# Patient Record
Sex: MALE | Race: WHITE | NOT HISPANIC OR LATINO | Employment: OTHER | URBAN - METROPOLITAN AREA
[De-identification: names, ages, dates, MRNs, and addresses within clinical notes are randomized per-mention and may not be internally consistent; named-entity substitution may affect disease eponyms.]

---

## 2017-01-03 ENCOUNTER — OFFICE VISIT (OUTPATIENT)
Dept: OPTOMETRY | Facility: CLINIC | Age: 67
End: 2017-01-03
Payer: COMMERCIAL

## 2017-01-03 DIAGNOSIS — H52.203 HYPEROPIA WITH ASTIGMATISM AND PRESBYOPIA, BILATERAL: ICD-10-CM

## 2017-01-03 DIAGNOSIS — H53.8 BLURRED VISION, LEFT EYE: ICD-10-CM

## 2017-01-03 DIAGNOSIS — H52.4 HYPEROPIA WITH ASTIGMATISM AND PRESBYOPIA, BILATERAL: ICD-10-CM

## 2017-01-03 DIAGNOSIS — H52.03 HYPEROPIA WITH ASTIGMATISM AND PRESBYOPIA, BILATERAL: ICD-10-CM

## 2017-01-03 DIAGNOSIS — Z96.1 PSEUDOPHAKIA OF BOTH EYES: ICD-10-CM

## 2017-01-03 DIAGNOSIS — H18.529 MAP-DOT-FINGERPRINT CORNEAL DYSTROPHY: Primary | ICD-10-CM

## 2017-01-03 PROCEDURE — 92014 COMPRE OPH EXAM EST PT 1/>: CPT | Mod: S$GLB,,, | Performed by: OPTOMETRIST

## 2017-01-03 PROCEDURE — 99999 PR PBB SHADOW E&M-EST. PATIENT-LVL III: CPT | Mod: PBBFAC,,, | Performed by: OPTOMETRIST

## 2017-01-03 RX ORDER — CYCLOSPORINE 0.5 MG/ML
1 EMULSION OPHTHALMIC 2 TIMES DAILY
Qty: 60 VIAL | Refills: 12 | Status: SHIPPED | OUTPATIENT
Start: 2017-01-03 | End: 2017-01-23

## 2017-01-04 NOTE — PROGRESS NOTES
HPI     Patient's age: 66 y.o.    Approximate date of last eye examination:  9/30/15  Name of last eye doctor seen: Dr. Salguero    Pt states that he is here for follow up from last visit not having any   trouble with eyes today.    Wears glasses? yes      Wears CLs?:  no             Headaches?  no  Eye pain/discomfort?  no                                                                                     Flashes?  no  Floaters?  no  Diplopia/Double vision?  no    Patient's Ocular History:         Any eye surgeries? yes       Family history of eye disease?  no    Significant patient medical history:         1. Diabetes?  no       If yes, IDDM or NIDDM? no   2. HBP?  no                ! OTC eyedrops currently using:  OTC  Refresh optive - OU   ! Prescription eye meds currently using:  Xiidra - OU           Last edited by Christine Finch MA on 1/3/2017  1:15 PM.         Assessment /Plan     For exam results, see Encounter Report.    Map-dot-fingerprint corneal dystrophy   Started on Xiidra last visit and refresh optive BID   Pt experiences intense stinging and blurred vision upon instillation of drops    Stop xiidra , start  cycloSPORINE (RESTASIS) 0.05 % ophthalmic emulsion; Place 0.4 mLs (1 drop total) into both eyes 2 (two) times daily.  Dispense: 60 vial; Refill: 12   RTC 1 month for follow up    Blurred vision, left eye  Pseudophakia of both eyes   Pt sometimes uses pilocarpine for to help with glare and halos since after cataract surgery    Hyperopia with astigmatism and presbyopia, bilateral  No need to change specs at this time   Recheck OS Rx at next visit      Fundus exam WNL today

## 2017-01-09 ENCOUNTER — TELEPHONE (OUTPATIENT)
Dept: NEUROLOGY | Facility: CLINIC | Age: 67
End: 2017-01-09

## 2017-01-16 ENCOUNTER — LAB VISIT (OUTPATIENT)
Dept: LAB | Facility: HOSPITAL | Age: 67
End: 2017-01-16
Attending: INTERNAL MEDICINE
Payer: COMMERCIAL

## 2017-01-16 DIAGNOSIS — Z13.9 SCREENING: ICD-10-CM

## 2017-01-16 DIAGNOSIS — E78.00 PURE HYPERCHOLESTEROLEMIA: ICD-10-CM

## 2017-01-16 DIAGNOSIS — Z12.5 PROSTATE CANCER SCREENING: ICD-10-CM

## 2017-01-16 LAB
25(OH)D3+25(OH)D2 SERPL-MCNC: 46 NG/ML
ALBUMIN SERPL BCP-MCNC: 4.2 G/DL
ALP SERPL-CCNC: 46 U/L
ALT SERPL W/O P-5'-P-CCNC: 25 U/L
ANION GAP SERPL CALC-SCNC: 8 MMOL/L
AST SERPL-CCNC: 25 U/L
BASOPHILS # BLD AUTO: 0.03 K/UL
BASOPHILS NFR BLD: 0.5 %
BILIRUB SERPL-MCNC: 0.8 MG/DL
BUN SERPL-MCNC: 15 MG/DL
CALCIUM SERPL-MCNC: 9.7 MG/DL
CHLORIDE SERPL-SCNC: 101 MMOL/L
CHOLEST/HDLC SERPL: 3.3 {RATIO}
CO2 SERPL-SCNC: 30 MMOL/L
COMPLEXED PSA SERPL-MCNC: 6.1 NG/ML
CREAT SERPL-MCNC: 1 MG/DL
CRP SERPL-MCNC: 1.5 MG/L
DIFFERENTIAL METHOD: ABNORMAL
EOSINOPHIL # BLD AUTO: 0.2 K/UL
EOSINOPHIL NFR BLD: 3 %
ERYTHROCYTE [DISTWIDTH] IN BLOOD BY AUTOMATED COUNT: 11.9 %
EST. GFR  (AFRICAN AMERICAN): >60 ML/MIN/1.73 M^2
EST. GFR  (NON AFRICAN AMERICAN): >60 ML/MIN/1.73 M^2
GLUCOSE SERPL-MCNC: 94 MG/DL
HCT VFR BLD AUTO: 48.2 %
HDL/CHOLESTEROL RATIO: 30.8 %
HDLC SERPL-MCNC: 221 MG/DL
HDLC SERPL-MCNC: 68 MG/DL
HGB BLD-MCNC: 16.1 G/DL
LDLC SERPL CALC-MCNC: 128.2 MG/DL
LYMPHOCYTES # BLD AUTO: 2.2 K/UL
LYMPHOCYTES NFR BLD: 35.2 %
MCH RBC QN AUTO: 31.3 PG
MCHC RBC AUTO-ENTMCNC: 33.4 %
MCV RBC AUTO: 94 FL
MONOCYTES # BLD AUTO: 0.5 K/UL
MONOCYTES NFR BLD: 7.3 %
NEUTROPHILS # BLD AUTO: 3.4 K/UL
NEUTROPHILS NFR BLD: 53.5 %
NONHDLC SERPL-MCNC: 153 MG/DL
PLATELET # BLD AUTO: 258 K/UL
PMV BLD AUTO: 9.5 FL
POTASSIUM SERPL-SCNC: 4.5 MMOL/L
PROT SERPL-MCNC: 7.8 G/DL
RBC # BLD AUTO: 5.15 M/UL
SODIUM SERPL-SCNC: 139 MMOL/L
TRIGL SERPL-MCNC: 124 MG/DL
WBC # BLD AUTO: 6.27 K/UL

## 2017-01-16 PROCEDURE — 82306 VITAMIN D 25 HYDROXY: CPT

## 2017-01-16 PROCEDURE — 36415 COLL VENOUS BLD VENIPUNCTURE: CPT

## 2017-01-16 PROCEDURE — 85025 COMPLETE CBC W/AUTO DIFF WBC: CPT

## 2017-01-16 PROCEDURE — 80061 LIPID PANEL: CPT

## 2017-01-16 PROCEDURE — 80053 COMPREHEN METABOLIC PANEL: CPT

## 2017-01-16 PROCEDURE — 86140 C-REACTIVE PROTEIN: CPT

## 2017-01-16 PROCEDURE — 84153 ASSAY OF PSA TOTAL: CPT

## 2017-01-18 ENCOUNTER — DOCUMENTATION ONLY (OUTPATIENT)
Dept: REHABILITATION | Facility: OTHER | Age: 67
End: 2017-01-18
Attending: PHYSICAL MEDICINE & REHABILITATION
Payer: COMMERCIAL

## 2017-01-18 NOTE — PROGRESS NOTES
Health  Wellness Visit Note    Name: Raffy Rutherford Jr.  Clinic Number: [id number]  Physician: No ref. provider found  Diagnosis: No diagnosis found.  Past Medical History   Diagnosis Date    Adenomatous polyp 2003    Adenomatous polyp     Allergy     Arthritis     Back pain      after trauma beginning in 195    Chronic pain      neck and left shoulder    Cluster headache 2013    Colon polyp     Degenerative disc disease     Depression     Diverticulitis 12/2013    Fibromyalgia 2013    GERD (gastroesophageal reflux disease)     Hepatitis 1970's     A    History of prostate biopsy 2002    Hyperlipidemia     Joint pain     Sleep apnea     Special screening for malignant neoplasms, colon 5/5/2014    Thyroid nodule 7/16/2014    Visual disturbance 2012     problems after cataract surgery     Visit Number: 42  Precautions: Standard  Time In:2:30 PM  Time Out: 3:17 PM  Total Treatment Time: 47 minutes    Subjective:   Patient reports that his neck is feeling great; not currently having any flare ups or increase in symptoms; mentioned that his lower back is feeling ok-pain still comes and goes and is primarily positional; mainly has lower back pain when sitting. Patient ordered a new chair for his office and finds that the chair is not appropriate for sitting for an extended period of time; says that his workouts are continuing to progress well-has decided to remove sugar completely from his diet and has started eating more protein; has been increasing the intensity of his independent workouts (pool exercises). Praised patient for his efforts.    Objective:   Raffy completed therapeutic stretches (EIL, Neck Retractions) and the following NextDigest exercise machines: core cervical, core lumbar, leg extension, leg curl, upright row, chest press, biceps curl, triceps extension, leg press    Please see exercise log in patient folder for rate of exertion and repetitions completed.     Assessment:   Patient  tolerated all exercises listed above on MedX equipment without any increase in symptoms; tolerated weight increases on the  leg extension, leg curl, row, chest press, and leg press. Iced neck and lower back after completion of exercises.     Plan:  Continue with established plan of care towards wellness goals.     Health : Kylee Zavaleta  12/28/2016

## 2017-01-23 ENCOUNTER — OFFICE VISIT (OUTPATIENT)
Dept: INTERNAL MEDICINE | Facility: CLINIC | Age: 67
End: 2017-01-23
Payer: COMMERCIAL

## 2017-01-23 ENCOUNTER — HOSPITAL ENCOUNTER (OUTPATIENT)
Dept: RADIOLOGY | Facility: HOSPITAL | Age: 67
Discharge: HOME | End: 2017-01-23
Attending: INTERNAL MEDICINE
Payer: COMMERCIAL

## 2017-01-23 VITALS
DIASTOLIC BLOOD PRESSURE: 70 MMHG | WEIGHT: 178.81 LBS | HEIGHT: 68 IN | HEART RATE: 64 BPM | BODY MASS INDEX: 27.1 KG/M2 | SYSTOLIC BLOOD PRESSURE: 104 MMHG

## 2017-01-23 DIAGNOSIS — R97.20 ELEVATED PSA: ICD-10-CM

## 2017-01-23 DIAGNOSIS — M54.16 LUMBAR BACK PAIN WITH RADICULOPATHY AFFECTING RIGHT LOWER EXTREMITY: ICD-10-CM

## 2017-01-23 DIAGNOSIS — R05.9 COUGH: ICD-10-CM

## 2017-01-23 DIAGNOSIS — Z00.00 ANNUAL PHYSICAL EXAM: Primary | ICD-10-CM

## 2017-01-23 DIAGNOSIS — G47.33 OBSTRUCTIVE SLEEP APNEA SYNDROME: ICD-10-CM

## 2017-01-23 DIAGNOSIS — G70.9 NEUROMUSCULAR DISORDER: ICD-10-CM

## 2017-01-23 PROCEDURE — 99999 PR PBB SHADOW E&M-EST. PATIENT-LVL IV: CPT | Mod: PBBFAC,,, | Performed by: INTERNAL MEDICINE

## 2017-01-23 PROCEDURE — 71020 XR CHEST PA AND LATERAL: CPT | Mod: TC

## 2017-01-23 PROCEDURE — 71020 XR CHEST PA AND LATERAL: CPT | Mod: 26,,, | Performed by: RADIOLOGY

## 2017-01-23 PROCEDURE — 99396 PREV VISIT EST AGE 40-64: CPT | Mod: S$GLB,,, | Performed by: INTERNAL MEDICINE

## 2017-01-23 NOTE — PROGRESS NOTES
Subjective:       Patient ID: Raffy Rutherford Jr. is a 66 y.o. male.    Chief Complaint: Annual Exam    HPI   Patients's list    Elevated PSA: Urology requested it be repeated  Fluid retention: patient did a test at [facility], his  questioned fluid weight:patient feels bloating ( will try a probiotic) suggested Fage daily  Diet: high protein, low fat  Restasis made tinnitus worse  Recurring pain: not seeing pain management, stopped the hydrocodone, started working out and doing stretches: using external TENS unit    Annual exam today     Annual exam: 1/23/2017  Colonoscopy 5/5/2014 : 3 polyps 5-10 years due 2019  Endoscopy 4/2015 pathology  Optho: Cataract surgery with continued visual changes: Dr. Ocampo  Tinnitus:   Vertigo stable lately  Flu: 2017  Tetanus: done  Shingles: done  Pneumovax :   Prevnar due at 67             Consultants  Dr. Jacob: uncertain neuro  Dr. Daniella Ocampo    Review of Systems   Constitutional: Negative for appetite change, chills, fever and unexpected weight change.   Respiratory: Negative for shortness of breath and wheezing.    Cardiovascular: Negative for chest pain, palpitations and leg swelling.   Gastrointestinal: Negative for abdominal pain, blood in stool, diarrhea, nausea and vomiting.     Cough may be related to GERD but will get  CXR  Objective:      Physical Exam   Constitutional: He is oriented to person, place, and time. He appears well-developed and well-nourished. No distress.   Neck: Carotid bruit is not present. No thyromegaly present.   Cardiovascular: Normal rate, regular rhythm and normal heart sounds.  PMI is not displaced.    Pulmonary/Chest: Effort normal and breath sounds normal. No respiratory distress.   Abdominal: Soft. Bowel sounds are normal. He exhibits no distension. There is no tenderness.   Musculoskeletal: He exhibits no edema.   Neurological: He is alert and oriented to person, place, and time.       Assessment:       1.  Annual physical exam    2. Elevated PSA    3. Lumbar back pain with radiculopathy affecting right lower extremity    4. Neuromuscular disorder    5. Cough    6. Obstructive sleep apnea syndrome        Plan:       Raffy was seen today for annual exam.    Diagnoses and all orders for this visit:    Annual physical exam: Health Maintenance Reviewed    Elevated PSA: elevated PSA, Mr. MONTY Gonzales asked the patient if the PSA could be repeated.  -     PROSTATE SPECIFIC ANTIGEN, DIAGNOSTIC; Future    Lumbar back pain with radiculopathy affecting right lower extremity: Patient wished to consider options for back pain.  -     Ambulatory consult to Neurosurgery    Neuromuscular disorder:Patient wished to see Dr. Palacios He has been seen both at [facility] and [facility]  -     Ambulatory consult to Neurology    Cough: discussed GERD  -     X-Ray Chest PA And Lateral; Future    Obstructive sleep apnea syndrome: check equipment  -     Ambulatory consult to Sleep Disorders      Return in about 6 months (around 7/23/2017) for Follow up no lab.    New Prescriptions    No medications on file       Modified Medications    No medications on file       Orders Placed This Encounter   Procedures    X-Ray Chest PA And Lateral     Standing Status:   Future     Standing Expiration Date:   3/24/2017    PROSTATE SPECIFIC ANTIGEN, DIAGNOSTIC     Standing Status:   Future     Standing Expiration Date:   3/24/2018    Ambulatory consult to Neurosurgery     Referral Priority:   Routine     Referral Type:   Consultation     Referral Reason:   Specialty Services Required     Referred to Provider:   Valeria Gross MD     Requested Specialty:   Neurosurgery     Number of Visits Requested:   1    Ambulatory consult to Neurology     Referral Priority:   Routine     Referral Type:   Consultation     Referral Reason:   Specialty Services Required     Requested Specialty:   Neurology     Number of Visits Requested:   1    Ambulatory consult to Sleep Disorders      Referral Priority:   Routine     Referral Type:   Consultation     Number of Visits Requested:   1       Labs, studies and consults associated with this visit were reviewed

## 2017-01-23 NOTE — MR AVS SNAPSHOT
[facility]  [address]  [facility] [address]  Phone: [phone]  Fax: [phone]                  Raffy Rutherford Jr.   2017 9:00 AM   Office Visit    Description:  Male : 1950   Provider:  Nini Rendon MD   Department:  [facility]           Reason for Visit     Annual Exam           Diagnoses this Visit        Comments    Annual physical exam    -  Primary     Elevated PSA         Lumbar back pain with radiculopathy affecting right lower extremity         Neuromuscular disorder         Cough         Obstructive sleep apnea syndrome                To Do List           Future Appointments        Provider Department Dept Phone    2017 1:30 PM Kylee MILLAN McGehee [facility] [place] [phone]    2017 8:30 AM MD Ian Nguyễn [facility] - Gastroenterology [phone]    2017 1:30 PM Jackie Ocampo OD [facility] - Optometry [phone]      Goals (5 Years of Data)     None      Follow-Up and Disposition     Return in about 6 months (around 2017) for Follow up no lab.      [facility]     [facility] -  Assistance  Registered nurses in the [facility] provide clinical advisement, health education, appointment booking, and other advisory services.  Call for this free service at [phone].             Medications           Message regarding Medications     Verify the changes and/or additions to your medication regime listed below are the same as discussed with your clinician today.  If any of these changes or additions are incorrect, please notify your healthcare provider.        STOP taking these medications     cycloSPORINE (RESTASIS) 0.05 % ophthalmic emulsion Place 0.4 mLs (1 drop total) into both eyes 2 (two) times daily.    desoximetasone 0.05 % Gel Apply to affected area 2 times a day as needed    EMSAM 9 mg/24 hr     gentamicin injection 160 mg     pantoprazole  "(PROTONIX) 40 MG tablet Take 1 tablet (40 mg total) by mouth once daily.    CARAFATE 100 mg/mL suspension            Verify that the below list of medications is an accurate representation of the medications you are currently taking.  If none reported, the list may be blank. If incorrect, please contact your healthcare provider. Carry this list with you in case of emergency.           Current Medications     acetaZOLAMIDE (DIAMOX) 250 MG tablet     benzonatate (TESSALON) 100 MG capsule Take 200 mg by mouth 3 (three) times daily as needed.    clonazePAM (KLONOPIN) 0.5 MG tablet 1 mg.     cyanocobalamin 1,000 mcg/mL injection     fish oil-omega-3 fatty acids 300-1,000 mg capsule Take 2 g by mouth once daily.    hydrocortisone-pramoxine (ANALPRAM-HC) 2.5-1 % Crea 2.5 mg.    ketoconazole (NIZORAL) 2 % cream Apply to affected area 2 times a day for 2 weeks    lamotrigine (LAMICTAL) 100 MG tablet Take 100 mg by mouth once daily.     lidocaine-prilocaine (EMLA) cream     meclizine (ANTIVERT) 25 mg tablet Take 1 tablet (25 mg total) by mouth every 6 (six) hours. Prn vertigo    rabeprazole (ACIPHEX) 20 mg tablet Take 1 tablet (20 mg total) by mouth 2 (two) times daily.    selegiline (EMSAM) 12 mg/24 hr Place 1 patch onto the skin once daily.    selegiline (EMSAM) 12 mg/24 hr Place 1 patch onto the skin once daily.    senna-docusate 8.6-50 mg (PERICOLACE) 8.6-50 mg per tablet Take 2 tablets by mouth nightly as needed for Constipation.    sucralfate (CARAFATE) 1 gram tablet as needed.     trazodone (DESYREL) 100 MG tablet 1 and 1/2 talbet daily    UNABLE TO FIND medication name: cefaly + electrodes, wear 20-30 mins, Dx: migraine    VOLTAREN 1 % Gel     VITAMIN D2 50,000 unit capsule TAKE 1 CAPSULE BY MOUTH TWICE A WEEK           Clinical Reference Information           Vital Signs - Last Recorded  Most recent update: 1/23/2017  8:21 AM by Alecia Cummings MA    BP Pulse Ht Wt BMI    104/70 64 5' 8" (1.727 m) 81.1 kg (178 lb " 12.7 oz) 27.19 kg/m2      Blood Pressure          Most Recent Value    BP  104/70      Allergies as of 1/23/2017     Alphagan [Brimonidine]    Coumadin [Warfarin]    Oxycodone      Immunizations Administered on Date of Encounter - 1/23/2017     None      Orders Placed During Today's Visit      Normal Orders This Visit    Ambulatory consult to Neurology     Ambulatory consult to Neurosurgery     Ambulatory consult to Sleep Disorders     Future Labs/Procedures Expected by Expires    PROSTATE SPECIFIC ANTIGEN, DIAGNOSTIC  1/23/2017 3/24/2018    X-Ray Chest PA And Lateral  1/23/2017 3/24/2017

## 2017-01-25 ENCOUNTER — DOCUMENTATION ONLY (OUTPATIENT)
Dept: REHABILITATION | Facility: OTHER | Age: 67
End: 2017-01-25
Attending: PHYSICAL MEDICINE & REHABILITATION
Payer: COMMERCIAL

## 2017-01-25 ENCOUNTER — OFFICE VISIT (OUTPATIENT)
Dept: UROLOGY | Facility: CLINIC | Age: 67
End: 2017-01-25
Payer: COMMERCIAL

## 2017-01-25 VITALS
HEIGHT: 68 IN | BODY MASS INDEX: 27 KG/M2 | DIASTOLIC BLOOD PRESSURE: 75 MMHG | WEIGHT: 178.13 LBS | SYSTOLIC BLOOD PRESSURE: 99 MMHG | HEART RATE: 68 BPM | RESPIRATION RATE: 16 BRPM

## 2017-01-25 DIAGNOSIS — N13.8 BPH WITH URINARY OBSTRUCTION: Primary | ICD-10-CM

## 2017-01-25 DIAGNOSIS — N40.1 BPH WITH URINARY OBSTRUCTION: Primary | ICD-10-CM

## 2017-01-25 DIAGNOSIS — R97.20 ELEVATED PSA: ICD-10-CM

## 2017-01-25 LAB
BILIRUB SERPL-MCNC: ABNORMAL MG/DL
BLOOD URINE, POC: ABNORMAL
COLOR, POC UA: ABNORMAL
GLUCOSE UR QL STRIP: ABNORMAL
KETONES UR QL STRIP: ABNORMAL
LEUKOCYTE ESTERASE URINE, POC: ABNORMAL
NITRITE, POC UA: ABNORMAL
PH, POC UA: 5
PROTEIN, POC: ABNORMAL
SPECIFIC GRAVITY, POC UA: 1.01
UROBILINOGEN, POC UA: ABNORMAL

## 2017-01-25 PROCEDURE — 99213 OFFICE O/P EST LOW 20 MIN: CPT | Mod: 25,S$GLB,, | Performed by: NURSE PRACTITIONER

## 2017-01-25 PROCEDURE — 81002 URINALYSIS NONAUTO W/O SCOPE: CPT | Mod: S$GLB,,, | Performed by: NURSE PRACTITIONER

## 2017-01-25 PROCEDURE — 99999 PR PBB SHADOW E&M-EST. PATIENT-LVL IV: CPT | Mod: PBBFAC,,, | Performed by: NURSE PRACTITIONER

## 2017-01-25 NOTE — PROGRESS NOTES
Subjective:       Patient ID: Raffy Rutherford Jr. is a 66 y.o. male.    Chief Complaint: Elevated PSA    HPI Comments: Raffy Rutherford Jr. is a 66 y.o. Male with history of BPH, prostatitis and elevated PSA.  (-) Trus/bx 1/117/2011 with Dr. El. PSA was 5.3 at the time.  Last clinic visit 12/17/2015  Unable to tolerate alpha blockers.    Here today with concerns for elevated PSA.  No voices no urinary complaints.  FOS is good.  Nocturia 0-1                        PSA                  5.9 (H)             01/23/2017                 PSA                  6.1 (H)             01/16/2017             PSA                  4.3*                04/28/2015                 PSA                  4.6*                02/25/2015                 PSA                      4.5*                02/21/2014                 PSA                      4.42*               05/06/2013                 PSA                      5.47*               10/04/2012                 PSA                      5.3*                01/03/2011                 PSA                      4.2*                09/27/2010                 PSA                      5.2*                08/23/2010                 PSA                      3.4                 07/21/2010                 PSA                      3.9                 05/10/2010                 PSA                      3.4                 04/22/2009                 PSA                      2.1                 10/16/2007                                Past Medical History:    Depression                                                    Hyperlipidemia                                                Chronic pain                                                    Comment:neck and left shoulder    Colon polyp                                                   Adenomatous polyp                               2003          History of prostate biopsy                      2002          GERD (gastroesophageal reflux disease)                         Back pain                                                       Comment:after trauma beginning in 195    Sleep apnea                                                   Allergy                                                       Arthritis                                                     Joint pain                                                    Hepatitis                                       1970's          Comment:A    Visual disturbance                              2012            Comment:problems after cataract surgery    Degenerative disc disease                                     Fibromyalgia                                    2013          Cluster headache                                2013          Diverticulitis                                  12/2013       Thyroid nodule                                  7/16/2014     Special screening for malignant neoplasms, col* 5/5/2014      Adenomatous polyp                                             Past Surgical History:    SINUS SURGERY                                                  KNEE SURGERY                                                     Comment:involving arthroscopic surgery to both knees    HEMORRHOID SURGERY                                               Comment:with complication of chronic bleeding for 6                weeks     CHOLECYSTECTOMY                                                SINUS SURGERY                                                    Comment:left molar and sinus surgery for trigeminal                neuralgia    EYE SURGERY                                                    BACK SURGERY                                                   JOINT REPLACEMENT                                              TOTAL HIP ARTHROPLASTY                           4/2012          Comment:Pt states he had total hip replacement on his                left hip.    COSMETIC SURGERY                                 2/10/2015        Comment:Direct mid-forehead brow lift    COSMETIC SURGERY                                 2/10/2015       Comment:Bilateral upper lid blepahroplasty    SPINE SURGERY                                    6/22/15         Comment:XLIF/TANA    CATARACT EXTRACTION W/  INTRAOCULAR LENS IMPLA* Bilateral               SPINAL FUSION                                    6/22/2015       Comment:L3-L5 XLIF    Review of patient's family history indicates:    Cancer                         Mother                      Comment: colon; uterine    Nephrolithiasis                Mother                    Stroke                         Mother                    Pancreatitis                   Brother                   Vision loss                    Brother                     Comment: macular degeneration    Diabetes                       Brother                   Macular degeneration           Brother                   Melanoma                       Neg Hx                    Amblyopia                      Neg Hx                    Blindness                      Neg Hx                    Cataracts                      Neg Hx                    Glaucoma                       Neg Hx                    Hypertension                   Neg Hx                    Retinal detachment             Neg Hx                    Strabismus                     Neg Hx                    Thyroid disease                Neg Hx                    Migraines                      Sister                    No Known Problems              Father                    No Known Problems              Maternal Grandmother      No Known Problems              Maternal Grandfather      No Known Problems              Paternal Grandmother      No Known Problems              Paternal Grandfather      No Known Problems              Sister                    No Known Problems              Maternal Aunt             No Known Problems              Maternal Uncle            No Known  Problems              Paternal Aunt             No Known Problems              Paternal Uncle              Social History    Marital Status:              Spouse Name:                       Years of Education:                 Number of children:               Occupational History  Occupation          Employer            Comment               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*                         Social History Main Topics    Smoking Status: Never Smoker                      Smokeless Status: Never Used                        Alcohol Use: Yes                Comment: occasion    Drug Use: No              Sexual Activity: Yes               Partners with: Female    Other Topics            Concern    None on file    Social History Narrative    None on file        Allergies:  Alphagan; Coumadin; and Oxycodone    Medications:  Current outpatient prescriptions: alendronate (FOSAMAX) 70 MG tablet, Take 1 tablet (70 mg total) by mouth every 7 days., Disp: 12 tablet, Rfl: 3;  clonazePAM (KLONOPIN) 1 MG tablet, TAKE 2 TABLETS AT BEDTIME. (Patient taking differently: TAKE 2 TABLETS AT BEDTIME. patient states taking 1.5), Disp: 180 tablet, Rfl: 1;  desoximetasone 0.05 % Gel, Apply to affected area 2 times a day as needed, Disp: , Rfl: 1  ergocalciferol (VITAMIN D2) 50,000 unit Cap, TAKE 1 CAPSULE (50,000 UNITS TOTAL) BY MOUTH TWICE A WEEK., Disp: 24 capsule, Rfl: 3;  fish oil-omega-3 fatty acids 300-1,000 mg capsule, Take 2 g by mouth once daily., Disp: , Rfl: ;  (START ON 12/22/2015) hydrocodone-acetaminophen 10-325mg (NORCO)  mg Tab, Take 1 tablet by mouth 3 (three) times daily as needed., Disp: 90 tablet, Rfl: 0  hydrocortisone-pramoxine (ANALPRAM-HC) 2.5-1 % Crea, 2.5 mg., Disp: , Rfl: ;  ketoconazole (NIZORAL) 2 % cream, Apply to affected area 2 times a day for 2 weeks, Disp: , Rfl: 1;  lamotrigine (LAMICTAL) 200 MG tablet, One and one/half daily (Patient taking differently: Take 150 mg by mouth once daily. One and one/half  daily), Disp: 135 tablet, Rfl: 3  magnesium oxide (MAG-OX) 400 mg tablet, Take 1 tablet (400 mg total) by mouth 2 (two) times daily., Disp: 60 tablet, Rfl: 12;  meclizine (ANTIVERT) 25 mg tablet, Take 1 tablet (25 mg total) by mouth every 6 (six) hours. Prn vertigo (Patient taking differently: Take 25 mg by mouth every 6 (six) hours as needed. Prn vertigo), Disp: 25 tablet, Rfl: 1;  methylPREDNISolone (MEDROL DOSEPACK) 4 mg tablet, use as directed, Disp: 1 Package, Rfl: 0  pilocarpine HCL 1% (PILOCAR) 1 % ophthalmic solution, Place 1 drop into both eyes once as needed. , Disp: , Rfl: ;  pravastatin (PRAVACHOL) 40 MG tablet, TAKE 1 TABLET (40 MG TOTAL) BY MOUTH EVERY EVENING., Disp: 30 tablet, Rfl: 5;  rabeprazole (ACIPHEX) 20 mg tablet, Take 2 tablets (40 mg total) by mouth once daily., Disp: 180 tablet, Rfl: 3;  selegiline (EMSAM) 12 mg/24 hr, Place 1 patch onto the skin once daily., Disp: 90 patch, Rfl: 3  senna-docusate 8.6-50 mg (PERICOLACE) 8.6-50 mg per tablet, Take 2 tablets by mouth nightly as needed for Constipation., Disp: , Rfl: ;  trazodone (DESYREL) 100 MG tablet, 1 and 1/2 talbet daily (Patient taking differently: every evening. 1 and 1/2 talbet daily), Disp: 135 tablet, Rfl: 3;  UBIDECARENONE (CO Q-10 ORAL), Take 1 capsule by mouth once daily. , Disp: , Rfl:   UNABLE TO FIND, medication name: cefaly + electrodes, wear 20-30 mins, Dx: migraine, Disp: 1 Units, Rfl: 0  Current facility-administered medications: gentamicin injection 160 mg, 160 mg, Intramuscular, Q12H, Usha Gonzales NP, 160 mg at 12/17/15 1504              Review of Systems    Objective:      Physical Exam   Nursing note and vitals reviewed.  Constitutional: He is oriented to person, place, and time. He appears well-developed and well-nourished.  Non-toxic appearance. He does not have a sickly appearance.   Urine dipped clear of infection today.   HENT:   Head: Normocephalic and atraumatic.   Right Ear: External ear normal.   Left  Ear: External ear normal.   Nose: Nose normal.   Mouth/Throat: Mucous membranes are normal.   Eyes: Conjunctivae and lids are normal. No scleral icterus.   Neck: Trachea normal, normal range of motion and full passive range of motion without pain. Neck supple. No JVD present. No tracheal deviation present.   Cardiovascular: Normal rate, regular rhythm, S1 normal and S2 normal.    Pulmonary/Chest: Effort normal and breath sounds normal. No respiratory distress. He exhibits no tenderness.   Abdominal: Soft. Normal appearance and bowel sounds are normal. There is no hepatosplenomegaly. There is no tenderness. There is no rebound, no guarding and no CVA tenderness.   Genitourinary: Rectum normal, testes normal and penis normal. Rectal exam shows no external hemorrhoid, no mass and no tenderness. Prostate is enlarged. Prostate is not tender. Right testis shows no mass, no swelling and no tenderness. Left testis shows no mass, no swelling and no tenderness. Circumcised. No hypospadias, penile erythema or penile tenderness. No discharge found.       Musculoskeletal: Normal range of motion.   Lymphadenopathy: No inguinal adenopathy noted on the right or left side.   Neurological: He is alert and oriented to person, place, and time. He has normal strength.   Skin: Skin is warm, dry and intact.     Psychiatric: He has a normal mood and affect. His behavior is normal. Judgment and thought content normal.       Assessment:       1. BPH with urinary obstruction    2. Elevated PSA        Plan:         I spent 20 minutes with the patient of which more than half was spent in direct consultation with the patient in regards to our treatment and plan.    Education and recommendations of today's plan of care including home remedies.  Discussed psa results in the past; reassurance.  Recommend repeat psa but 1-2 months if trending up then Angelika 3.0 MRI before rebiopsying.  Voiced understanding

## 2017-01-25 NOTE — PROGRESS NOTES
Health  Wellness Visit Note    Name: Raffy Rutherford Jr.  Clinic Number: [id number]  Physician: No ref. provider found  Diagnosis: No diagnosis found.  Past Medical History   Diagnosis Date    Adenomatous polyp 2003    Adenomatous polyp     Allergy     Arthritis     Back pain      after trauma beginning in 195    Chronic pain      neck and left shoulder    Cluster headache 2013    Colon polyp     Degenerative disc disease     Depression     Diverticulitis 12/2013    Elevated PSA     Fibromyalgia 2013    GERD (gastroesophageal reflux disease)     Hepatitis 1970's     A    History of prostate biopsy 2002    Hyperlipidemia     Joint pain     Sleep apnea     Special screening for malignant neoplasms, colon 5/5/2014    Thyroid nodule 7/16/2014    Visual disturbance 2012     problems after cataract surgery     Visit Number: 43  Precautions: Standard  Time In:2:30 PM  Time Out: 3:18 PM  Total Treatment Time: 48 minutes    Subjective:   Patient reports that he is having lower back pain today; is currently wearing a tens unit to help alleviate pain; pain worsen as the day progresses on ; pain is also dependent on how much driving he does in a particular day; workouts at [facility] at the [facility] are still progressing well-does a lot of lower body exercises; patient feels that he needs to focus more on gait training-began walking on the treadmill because it forces him to walk correctly; not currently having any pain or symptoms.    Objective:   Raffy completed therapeutic stretches (EIL, Neck Retractions) and the following MedX exercise machines: core cervical, core lumbar, leg extension, leg curl, upright row, chest press, biceps curl, triceps extension, leg press    Please see exercise log in patient folder for rate of exertion and repetitions completed.     Assessment:   Patient tolerated all exercises listed above on MedX equipment without any increase in symptoms; tolerated weight increases on the  leg  extension, leg curl, row, chest press, and leg press. Iced neck, left hip and lower back after completion of exercises.     Plan:  Continue with established plan of care towards wellness goals.     Health : Kylee Zavaleta  12/28/2016

## 2017-01-25 NOTE — MR AVS SNAPSHOT
[facility]  [address]  [facility] [address]  Phone: [phone]                  Raffy Rutherford Jr.   2017 1:40 PM   Office Visit    Description:  Male : 1950   Provider:  Usha Gonzales NP   Department:  [facility]           Reason for Visit     Elevated PSA           Diagnoses this Visit        Comments    BPH with urinary obstruction    -  Primary     Elevated PSA                To Do List           Future Appointments        Provider Department Dept Phone    2017 2:30 PM Brea Mott [facility] [phone]    2017 10:00 AM Valeria Gross MD [facility] - Neurosurgery 7th Fl [phone]    2017 11:00 AM LAB, HEMONC CANCER BLDG [facility] [phone]    2017 8:30 AM Amadou Hardin MD [facility] - Gastroenterology [phone]    2017 1:30 PM Jackie Ocampo OD [facility] - Optometry [phone]      Goals (5 Years of Data)     None      Follow-Up and Disposition     Return in about 6 months (around 2017).      [facility]     [facility] -  Assistance  Registered nurses in the [facility] provide clinical advisement, health education, appointment booking, and other advisory services.  Call for this free service at [phone].             Medications           Message regarding Medications     Verify the changes and/or additions to your medication regime listed below are the same as discussed with your clinician today.  If any of these changes or additions are incorrect, please notify your healthcare provider.             Verify that the below list of medications is an accurate representation of the medications you are currently taking.  If none reported, the list may be blank. If incorrect, please contact your healthcare provider. Carry this list with you in case of emergency.           Current Medications     acetaZOLAMIDE (DIAMOX) 250 MG  "tablet     benzonatate (TESSALON) 100 MG capsule Take 200 mg by mouth 3 (three) times daily as needed.    clonazePAM (KLONOPIN) 0.5 MG tablet 1 mg.     cyanocobalamin 1,000 mcg/mL injection     fish oil-omega-3 fatty acids 300-1,000 mg capsule Take 2 g by mouth once daily.    hydrocortisone-pramoxine (ANALPRAM-HC) 2.5-1 % Crea 2.5 mg.    ketoconazole (NIZORAL) 2 % cream Apply to affected area 2 times a day for 2 weeks    lamotrigine (LAMICTAL) 100 MG tablet Take 100 mg by mouth once daily.     lidocaine-prilocaine (EMLA) cream     meclizine (ANTIVERT) 25 mg tablet Take 1 tablet (25 mg total) by mouth every 6 (six) hours. Prn vertigo    rabeprazole (ACIPHEX) 20 mg tablet Take 1 tablet (20 mg total) by mouth 2 (two) times daily.    selegiline (EMSAM) 12 mg/24 hr Place 1 patch onto the skin once daily.    selegiline (EMSAM) 12 mg/24 hr Place 1 patch onto the skin once daily.    senna-docusate 8.6-50 mg (PERICOLACE) 8.6-50 mg per tablet Take 2 tablets by mouth nightly as needed for Constipation.    sucralfate (CARAFATE) 1 gram tablet as needed.     trazodone (DESYREL) 100 MG tablet 1 and 1/2 talbet daily    UNABLE TO FIND medication name: cefaly + electrodes, wear 20-30 mins, Dx: migraine    VITAMIN D2 50,000 unit capsule TAKE 1 CAPSULE BY MOUTH TWICE A WEEK    VOLTAREN 1 % Gel            Clinical Reference Information           Vital Signs - Last Recorded  Most recent update: 1/25/2017  1:20 PM by Diana Edward LPN    BP Pulse Resp Ht Wt BMI    99/75 68 16 5' 8" (1.727 m) 80.8 kg (178 lb 2.1 oz) 27.08 kg/m2      Blood Pressure          Most Recent Value    BP  99/75      Allergies as of 1/25/2017     Alphagan [Brimonidine]    Coumadin [Warfarin]    Oxycodone      Immunizations Administered on Date of Encounter - 1/25/2017     None      Orders Placed During Today's Visit      Normal Orders This Visit    POCT urine dipstick without microscope     Future Labs/Procedures Expected by Expires    Prostate Specific Antigen, " Diagnostic  1/25/2017 3/1/2018         1/25/2017  1:37 PM - Diana Edward LPN      Component Results     Component    Color, UA    yellow clear    Spec Grav, UA    1.015    pH, UA    5    WBC, UA    neg    Nitrite, UA    neg    Protein, UA    pos    Glucose, UA    neg    Ketones, UA    neg    Urobilinogen, UA    neg    Bilirubin, UA    neg    Blood, UA    neg

## 2017-02-01 ENCOUNTER — DOCUMENTATION ONLY (OUTPATIENT)
Dept: REHABILITATION | Facility: OTHER | Age: 67
End: 2017-02-01
Attending: PHYSICAL MEDICINE & REHABILITATION
Payer: COMMERCIAL

## 2017-02-01 NOTE — PROGRESS NOTES
Health  Wellness Visit Note    Name: Raffy Rutherford Jr.  Clinic Number: [id number]  Physician: Jessy García, *  Diagnosis: No diagnosis found.  Past Medical History   Diagnosis Date    Adenomatous polyp 2003    Adenomatous polyp     Allergy     Arthritis     Back pain      after trauma beginning in 195    Chronic pain      neck and left shoulder    Cluster headache 2013    Colon polyp     Degenerative disc disease     Depression     Diverticulitis 12/2013    Elevated PSA     Fibromyalgia 2013    GERD (gastroesophageal reflux disease)     Hepatitis 1970's     A    History of prostate biopsy 2002    Hyperlipidemia     Joint pain     Sleep apnea     Special screening for malignant neoplasms, colon 5/5/2014    Thyroid nodule 7/16/2014    Visual disturbance 2012     problems after cataract surgery     Visit Number: 44  Precautions: Standard  Time In: 1:44 PM  Time Out: 2:30 PM  Total Treatment Time: 46 minutes    Subjective:   Patient reports that he is still continuing to have lower back pain; no neck pain presently; pain primarily occurs towards the end of the day or if he sees a lot of patients; tries to get up and move around to prevent any increase in pain; says that his pain is mainly located in the middle of his lower back; often wears a tens unit on his lower back and his right calf muscle which helps; encouraged patient to stretch in his office during the day; Raffy does not have any shooting pain down either of his leg; mentioned that he does have numbness and tingling in his calf and also in his foot. Patient has an appointment with a physician tomorrow concerning his lower back symptoms.    Objective:   Raffy completed therapeutic stretches (EIL, Neck Retractions) and the following MedX exercise machines: core cervical, core lumbar, leg extension, leg curl, upright row, chest press, biceps curl, triceps extension, leg press    Please see exercise log in patient folder for  rate of exertion and repetitions completed.     Assessment:   Patient tolerated all exercises listed above on MedX equipment without any increase in symptoms; tolerated weight increases on the all exercises except for the triceps extension and bicep curl; will have weight increase on all lower extremity exercises and the chest press. Iced neck and lower back after completion of exercises.     Plan:  Continue with established plan of care towards wellness goals. Check    Health : Brea Mott  12/28/2016

## 2017-02-02 ENCOUNTER — OFFICE VISIT (OUTPATIENT)
Dept: NEUROSURGERY | Facility: CLINIC | Age: 67
End: 2017-02-02
Payer: COMMERCIAL

## 2017-02-02 ENCOUNTER — TELEPHONE (OUTPATIENT)
Dept: NEUROSURGERY | Facility: CLINIC | Age: 67
End: 2017-02-02

## 2017-02-02 VITALS
BODY MASS INDEX: 27.19 KG/M2 | HEART RATE: 66 BPM | HEIGHT: 68 IN | SYSTOLIC BLOOD PRESSURE: 113 MMHG | WEIGHT: 179.38 LBS | DIASTOLIC BLOOD PRESSURE: 71 MMHG | TEMPERATURE: 99 F

## 2017-02-02 DIAGNOSIS — M54.5 LOW BACK PAIN, UNSPECIFIED BACK PAIN LATERALITY, UNSPECIFIED CHRONICITY, WITH SCIATICA PRESENCE UNSPECIFIED: Primary | ICD-10-CM

## 2017-02-02 DIAGNOSIS — M51.26 DISPLACEMENT OF LUMBAR INTERVERTEBRAL DISC WITHOUT MYELOPATHY: ICD-10-CM

## 2017-02-02 DIAGNOSIS — M51.37 DEGENERATION OF LUMBAR OR LUMBOSACRAL INTERVERTEBRAL DISC: Primary | ICD-10-CM

## 2017-02-02 DIAGNOSIS — M48.061 SPINAL STENOSIS, LUMBAR REGION, WITHOUT NEUROGENIC CLAUDICATION: ICD-10-CM

## 2017-02-02 PROCEDURE — 99999 PR PBB SHADOW E&M-EST. PATIENT-LVL III: CPT | Mod: PBBFAC,,, | Performed by: NEUROLOGICAL SURGERY

## 2017-02-02 PROCEDURE — 99244 OFF/OP CNSLTJ NEW/EST MOD 40: CPT | Mod: S$GLB,,, | Performed by: NEUROLOGICAL SURGERY

## 2017-02-02 NOTE — LETTER
February 2, 2017      Nini Rendon MD  [address]  [facility] [address]           [facility] - Neurosurgery 7th Fl  [address]  [facility] [address]  Phone: [phone]          Patient: Raffy Rutherford Jr.   MR Number: [id number]   YOB: 1950   Date of Visit: 2/2/2017       Dear Dr. Nini Rendon:    Thank you for referring Raffy Rutherford to me for evaluation. Attached you will find relevant portions of my assessment and plan of care.    If you have questions, please do not hesitate to call me. I look forward to following Raffy Rutherford along with you.    Sincerely,    Valeria Gross MD    Enclosure  CC:  No Recipients    If you would like to receive this communication electronically, please contact [email or web address] or [phone] to request more information on [employer] Link access.    For providers and/or their staff who would like to refer a patient to [facility], please contact us through our one-stop-shop provider referral line, [facility] [place], at [phone].    If you feel you have received this communication in error or would no longer like to receive these types of communications, please e-mail [email or web address]

## 2017-02-02 NOTE — MR AVS SNAPSHOT
[facility]  [address]  [facility] [address]  Phone: [phone]                  Raffy Rutherford Jr.   2017 10:00 AM   Office Visit    Description:  Male : 1950   Provider:  Valeria Gross MD   Department:  [facility] 7th [facility]           Diagnoses this Visit        Comments    Degeneration of lumbar or lumbosacral intervertebral disc    -  Primary     Displacement of lumbar intervertebral disc without myelopathy         Spinal stenosis, lumbar region, without neurogenic claudication                To Do List           Future Appointments        Provider Department Dept Phone    2017 1:30 PM Jackie Ocampo OD [facility] - Optometry [phone]    2017 8:30 AM Leona Maya MD [facility] Pain Management [phone]    2017 10:30 AM Gerald Arriaga [facility] [phone]    2/15/2017 1:30 PM Brea Mott [facility] [phone] 11:30 AM Amadou Hardin MD [facility] - Gastroenterology [phone]      Goals (5 Years of Data)     None      Follow-Up and Disposition     Follow-up and Disposition History      [facility]     [facility] -  Assistance  Registered nurses in the [facility] provide clinical advisement, health education, appointment booking, and other advisory services.  Call for this free service at [phone].             Medications           Message regarding Medications     Verify the changes and/or additions to your medication regime listed below are the same as discussed with your clinician today.  If any of these changes or additions are incorrect, please notify your healthcare provider.             Verify that the below list of medications is an accurate representation of the medications you are currently taking.  If none reported, the list may be blank. If incorrect, please contact your healthcare provider. Carry this list  "with you in case of emergency.           Current Medications     acetaZOLAMIDE (DIAMOX) 250 MG tablet     benzonatate (TESSALON) 100 MG capsule Take 200 mg by mouth 3 (three) times daily as needed.    clonazePAM (KLONOPIN) 0.5 MG tablet 1 mg.     cyanocobalamin 1,000 mcg/mL injection     fish oil-omega-3 fatty acids 300-1,000 mg capsule Take 2 g by mouth once daily.    hydrocortisone-pramoxine (ANALPRAM-HC) 2.5-1 % Crea 2.5 mg.    ketoconazole (NIZORAL) 2 % cream Apply to affected area 2 times a day for 2 weeks    lamotrigine (LAMICTAL) 100 MG tablet Take 100 mg by mouth once daily.     lidocaine-prilocaine (EMLA) cream     meclizine (ANTIVERT) 25 mg tablet Take 1 tablet (25 mg total) by mouth every 6 (six) hours. Prn vertigo    rabeprazole (ACIPHEX) 20 mg tablet Take 1 tablet (20 mg total) by mouth 2 (two) times daily.    selegiline (EMSAM) 12 mg/24 hr Place 1 patch onto the skin once daily.    selegiline (EMSAM) 12 mg/24 hr Place 1 patch onto the skin once daily.    senna-docusate 8.6-50 mg (PERICOLACE) 8.6-50 mg per tablet Take 2 tablets by mouth nightly as needed for Constipation.    sucralfate (CARAFATE) 1 gram tablet as needed.     trazodone (DESYREL) 100 MG tablet 1 and 1/2 talbet daily    UNABLE TO FIND medication name: cefaly + electrodes, wear 20-30 mins, Dx: migraine    VITAMIN D2 50,000 unit capsule TAKE 1 CAPSULE BY MOUTH TWICE A WEEK    VOLTAREN 1 % Gel            Clinical Reference Information           Your Vitals Were     BP Pulse Temp Height Weight BMI    113/71 (BP Location: Right arm, Patient Position: Sitting, BP Method: Automatic) 66 99 °F (37.2 °C) (Oral) 5' 8" (1.727 m) 81.4 kg (179 lb 6.4 oz) 27.28 kg/m2      Blood Pressure          Most Recent Value    BP  113/71      Allergies as of 2/2/2017     Alphagan [Brimonidine]    Coumadin [Warfarin]    Oxycodone      Immunizations Administered on Date of Encounter - 2/2/2017     None      Orders Placed During Today's Visit      Normal Orders This " Visit    Ambulatory consult to Pain Clinic       Language Assistance Services     ATTENTION: Language assistance services are available, free of charge. Please call [phone].      ATENCIÓN: Si habla kristina, tiene a sandoval disposición servicios gratuitos de asistencia lingüística. Llame al [phone].     CHÚ Ý: N?u b?n nói Ti?ng Vi?t, có các d?ch v? h? tr? ngôn ng? mi?n phí dành cho b?n. G?i s? [phone].         [facility] [address] complies with applicable Federal civil rights laws and does not discriminate on the basis of race, color, national origin, age, disability, or sex.

## 2017-02-02 NOTE — PROGRESS NOTES
Mr. Rutherford is a 66-year-old male who was referred to me by Dr. Nini Rendon.  His   past medical history is significant for allergy, arthritis, headaches,   depression, diverticulitis, fibromyalgia, GERD, hepatitis, hyperlipidemia and   degenerative disk disease.  He is status post L3-L4 and L4-L5 X-lift by Dr. Browne in June 2015.  Preoperatively, he complained of severe back pain and   right lower extremity radiating pain.  The surgery helped his back pain, but did   not really help his right leg pain.  Even prior to the surgery, he had   underwent a spinal cord stimulator trial, which he does not think was that   effective in relieving his pain.  Therefore, he ultimately underwent surgery.    He does think that the surgery helped his back pain, but it did not really ever   help his leg pain.  He continues to complain of back pain, which is worse with   activity.  It is worse at the end of the day.  He complains of right leg pain.    He does not really have any radiating pain going from his hip into his knee.    Most of his pain is down the anterior aspect of his right shin into the top of   his right foot.  He complains of a burning sensation on the top of his right   foot.  He complains of numbness and tingling in his toes and on the bottom of   his right foot.  He denies any pain or numbness in his left leg or left foot.    He had a right foot drop, preoperatively, which did improve postoperatively.  He   is here today to discuss spinal cord stimulation.    His past medical history, surgical history, family history, social history and   10-point review of systems are as per the Neurosurgery intake form, which I   reviewed.    MEDICATIONS AND ALLERGIES:  As documented in EPIC.    PHYSICAL EXAMINATION:  VITAL SIGNS:  Today, his blood pressure is 113/71, pulse 66, temperature 99,   weight of 179, height of 5 feet 8 inches with a BMI of 27.2 weight.  His pain   score is 5/10 and located in his back.  GENERAL:   He is well developed, well groomed.  He appears to be in a moderate   amount of pain while sitting in the exam room chair today.  NEUROLOGIC:  He is oriented to person, place and time.  MUSCULOSKELETAL:  He has good tone in both upper and lower extremities without   any evidence of atrophy.  He has 5/5 motor strength throughout bilateral upper   extremities and the left lower extremity including the deltoid, bicep, tricep,   wrist extensor, wrist flexor, hand intrinsic, iliopsoas, quadriceps, hamstring,   gastrocnemius, tibialis anterior and extensor hallucis longus.  In the right   lower extremity, he is 5/5 in the iliopsoas, quadriceps and hamstring.  He has   5/5 in plantar flexion, 4/5 in dorsiflexion and 4/5 in EHL.  He has no De Leon's   and no clonus.    IMAGING:  CAT scan of the lumbar spine available for review, which I personally   reviewed.  This shows a T12 kyphoplasty.  It shows L3-L4 and L4-L5 interbody   spacers with L3 through L5 posterior instrumentation.    IMPRESSION AND PLAN:  Mr. Rutherford is a 66-year-old male with chronic back pain and   leg pain.  He is not interested in any further surgical intervention at this   time.  He wanted to discuss spinal cord stimulation and the pros and cons of a   percutaneous trial versus a ____ trial.  After a long discussion with the   patient and his wife, we decided to proceed with a percutaneous lead trial.  He   has had bad experiences with epidural steroid injections and medial branch   blocks in the past and he is not interested in any further interventional   procedures, but would like to proceed with a spinal cord stimulator trial.  He   would like to use the [email or web address] system.  I will get him set up with Pain   Management for a percutaneous spinal cord stimulator trial.  If this is   successful, I will see the patient back for permanent implantation with a paddle   electrode.  He knows he can call with any further questions or concerns in the    jessica.      NOREEN/IN  dd: 02/02/2017 11:16:01 (CST)  td: 02/03/2017 04:25:39 (CST)  Doc ID   #[id number]  Job ID #[id number]    CC:       Dictation #: [id number]

## 2017-02-08 ENCOUNTER — OFFICE VISIT (OUTPATIENT)
Dept: PAIN MEDICINE | Facility: CLINIC | Age: 67
End: 2017-02-08
Attending: NEUROLOGICAL SURGERY
Payer: COMMERCIAL

## 2017-02-08 ENCOUNTER — DOCUMENTATION ONLY (OUTPATIENT)
Dept: REHABILITATION | Facility: OTHER | Age: 67
End: 2017-02-08
Attending: PHYSICAL MEDICINE & REHABILITATION
Payer: COMMERCIAL

## 2017-02-08 ENCOUNTER — LAB VISIT (OUTPATIENT)
Dept: LAB | Facility: OTHER | Age: 67
End: 2017-02-08
Attending: NURSE PRACTITIONER
Payer: COMMERCIAL

## 2017-02-08 VITALS
WEIGHT: 176 LBS | BODY MASS INDEX: 26.67 KG/M2 | SYSTOLIC BLOOD PRESSURE: 109 MMHG | DIASTOLIC BLOOD PRESSURE: 74 MMHG | HEIGHT: 68 IN | HEART RATE: 63 BPM | TEMPERATURE: 98 F

## 2017-02-08 DIAGNOSIS — N13.8 BPH WITH URINARY OBSTRUCTION: ICD-10-CM

## 2017-02-08 DIAGNOSIS — M51.36 DDD (DEGENERATIVE DISC DISEASE), LUMBAR: Primary | ICD-10-CM

## 2017-02-08 DIAGNOSIS — M54.16 RIGHT LUMBAR RADICULOPATHY: ICD-10-CM

## 2017-02-08 DIAGNOSIS — R97.20 ELEVATED PSA: ICD-10-CM

## 2017-02-08 DIAGNOSIS — M54.16 LEFT LUMBAR RADICULOPATHY: ICD-10-CM

## 2017-02-08 DIAGNOSIS — M48.061 SPINAL STENOSIS, LUMBAR REGION, WITHOUT NEUROGENIC CLAUDICATION: ICD-10-CM

## 2017-02-08 DIAGNOSIS — N40.1 BPH WITH URINARY OBSTRUCTION: ICD-10-CM

## 2017-02-08 LAB — COMPLEXED PSA SERPL-MCNC: 7.2 NG/ML

## 2017-02-08 PROCEDURE — 99999 PR PBB SHADOW E&M-EST. PATIENT-LVL III: CPT | Mod: PBBFAC,,, | Performed by: ANESTHESIOLOGY

## 2017-02-08 PROCEDURE — 84153 ASSAY OF PSA TOTAL: CPT

## 2017-02-08 PROCEDURE — 36415 COLL VENOUS BLD VENIPUNCTURE: CPT

## 2017-02-08 PROCEDURE — 99214 OFFICE O/P EST MOD 30 MIN: CPT | Mod: S$GLB,,, | Performed by: ANESTHESIOLOGY

## 2017-02-08 NOTE — PROGRESS NOTES
Health  Wellness Visit Note    Name: Raffy Rutherford Jr.  Clinic Number: [id number]  Physician: Jessy García, *  Diagnosis: No diagnosis found.  Past Medical History   Diagnosis Date    Adenomatous polyp 2003    Adenomatous polyp     Allergy     Arthritis     Back pain      after trauma beginning in 195    Chronic pain      neck and left shoulder    Cluster headache 2013    Colon polyp     Degenerative disc disease     Depression     Diverticulitis 12/2013    Elevated PSA     Fibromyalgia 2013    GERD (gastroesophageal reflux disease)     Hepatitis 1970's     A    History of prostate biopsy 2002    Hyperlipidemia     Joint pain     Sleep apnea     Special screening for malignant neoplasms, colon 5/5/2014    Thyroid nodule 7/16/2014    Visual disturbance 2012     problems after cataract surgery     Visit Number: 45  Precautions: Standard  Time In: 9: 50AM  Time Out: 10: 40PM  Total Treatment Time:  50 minutes    Subjective:   Patient reports that he has no neck pain today. Still continuing to have lower back pain;primarily occurs towards the end of the day or if he sees a lot of patients and has to sit for a while; tries to get up and move around to prevent any increase in pain; says that his pain is mainly located in the middle of his lower back; often wears a tens unit on his lower back and his right calf muscle which helps.Still gets L side shooting pain down his leg into calf and foot, encouraged patient to stretch his hamstring in his office during the day. Pt had appointment earlier with  concerning his symptoms in lower back,they discussed putting in a stimulator, he is unsure, because he doesn't want to have surgery.     Patient has an appointment with a physician tomorrow concerning his lower back symptoms.    Objective:   Raffy completed therapeutic stretches (EIL, Neck Retractions) and the following Med[employer] exercise machines: core cervical, core lumbar, leg extension, leg  curl, upright row, chest press, biceps curl, triceps extension, leg press    Please see exercise log in patient folder for rate of exertion and repetitions completed.     Assessment:   Patient tolerated all exercises listed above on MedX equipment without any increase in symptoms; Will increase all other peripheral machines except  row and bicep curl; Iced neck and lower back after completion of exercises.     Plan:  Continue with established plan of care towards wellness goals. Check in on daily hamstring stretching and decision on stimulator.    Health : Brea Mott  12/28/2016

## 2017-02-08 NOTE — PROGRESS NOTES
Subjective:     Patient ID: Raffy Rutherford Jr. is a 66 y.o. male.    Chief Complaint: Pain      Disclaimer: This note was generated using voice recognition software.  There may be a typographical errors that were missed during proofreading.    He has been seen in our clinic before by Dr. Evans, but he is a new patient to me today    HPI:    Raffy Rutherford Jr. is a 66 y.o. male who presents today with back pain radiating into the right leg along an L4,5, S1 distribution. He is status post L3-L4 and L4-L5 X-lift by Dr. Browne in June 2015. Preoperatively, he complained of severe back pain and right lower extremity radiating pain. The surgery helped his back pain, but did not really help his right leg pain. He has now failed multiple different management modalities.  He has been referred by Dr. Gross for possible SCS trial.    Physical Therapy: previously tried in the past    Non-pharmacologic Treatment:          · TENS? Yes, currently on low back and right leg    Pain Medications:         · Currently taking: emla cream, lamotrigine    · Has tried in the past:  Advil, motrin, flexeril, neurontin, lyrica, norco, fentany    Blood thinners: none    Interventional Therapies: yes    Relevant Surgeries:   · L3-L4 and L4-L5 X-lift by Dr. Browne in June 2015  · T12 vertebroplasty    Affecting sleep? no    Affecting daily activities? yes    Depressive symptoms? no          · SI/HI? No    Work status: current    Pain Scales  Best: 2/10  Worst: 8/10  Usually: 6/10  Today: 6/10    Low-back Pain   This is a chronic problem. The current episode started more than 1 year ago. The problem occurs intermittently. The problem has been gradually worsening since onset. The pain is present in the lumbar spine. The pain radiates to the right foot, right knee and right thigh. The quality of the pain is described as burning (hot cold tingling). The pain is at a severity of 6/10. The pain is mild. The pain is worse during the night. The  symptoms are aggravated by sitting and standing. Stiffness is present at night. Associated symptoms include leg pain. He has tried muscle relaxant (medication) for the symptoms. The treatment provided mild relief.       Last 3 PDI Scores 2/8/2017 11/17/2015 10/22/2015   Pain Disability Index (PDI) 25 35 22   ]    Review of Systems   Musculoskeletal: Positive for back pain.   All other systems reviewed and are negative.            Past Medical History   Diagnosis Date    Adenomatous polyp 2003    Adenomatous polyp     Allergy     Arthritis     Back pain      after trauma beginning in 195    Chronic pain      neck and left shoulder    Cluster headache 2013    Colon polyp     Degenerative disc disease     Depression     Diverticulitis 12/2013    Elevated PSA     Fibromyalgia 2013    GERD (gastroesophageal reflux disease)     Hepatitis 1970's     A    History of prostate biopsy 2002    Hyperlipidemia     Joint pain     Sleep apnea     Special screening for malignant neoplasms, colon 5/5/2014    Thyroid nodule 7/16/2014    Visual disturbance 2012     problems after cataract surgery       Past Surgical History   Procedure Laterality Date    Sinus surgery      Knee surgery       involving arthroscopic surgery to both knees    Hemorrhoid surgery       with complication of chronic bleeding for 6 weeks     Cholecystectomy      Sinus surgery       left molar and sinus surgery for trigeminal neuralgia    Eye surgery      Back surgery      Joint replacement      Total hip arthroplasty  4/2012     Pt states he had total hip replacement on his left hip.    Cosmetic surgery  2/10/2015     Direct mid-forehead brow lift    Cosmetic surgery  2/10/2015     Bilateral upper lid blepahroplasty    Spine surgery  6/22/15     XLIF/TANA    Cataract extraction w/  intraocular lens implant Bilateral     Spinal fusion  6/22/2015     L3-L5 XLIF       Review of patient's allergies indicates:   Allergen  Reactions    Alphagan [brimonidine]      Patient taking MASO-B Selective Inhibitor Selegiline (Emsam)    Coumadin [warfarin]      itch    Oxycodone      hiccups       Current Outpatient Prescriptions   Medication Sig Dispense Refill    acetaZOLAMIDE (DIAMOX) 250 MG tablet       benzonatate (TESSALON) 100 MG capsule Take 200 mg by mouth 3 (three) times daily as needed.  1    clonazePAM (KLONOPIN) 0.5 MG tablet 1 mg.       cyanocobalamin 1,000 mcg/mL injection   1    fish oil-omega-3 fatty acids 300-1,000 mg capsule Take 2 g by mouth once daily.      hydrocortisone-pramoxine (ANALPRAM-HC) 2.5-1 % Crea 2.5 mg.      ketoconazole (NIZORAL) 2 % cream Apply to affected area 2 times a day for 2 weeks  1    lamotrigine (LAMICTAL) 100 MG tablet Take 100 mg by mouth once daily.       lidocaine-prilocaine (EMLA) cream       meclizine (ANTIVERT) 25 mg tablet Take 1 tablet (25 mg total) by mouth every 6 (six) hours. Prn vertigo (Patient taking differently: Take 25 mg by mouth every 6 (six) hours as needed. Prn vertigo) 25 tablet 1    rabeprazole (ACIPHEX) 20 mg tablet Take 1 tablet (20 mg total) by mouth 2 (two) times daily. (Patient taking differently: Take 20 mg by mouth once daily. ) 180 tablet 3    selegiline (EMSAM) 12 mg/24 hr Place 1 patch onto the skin once daily. 90 patch 3    selegiline (EMSAM) 12 mg/24 hr Place 1 patch onto the skin once daily.      senna-docusate 8.6-50 mg (PERICOLACE) 8.6-50 mg per tablet Take 2 tablets by mouth nightly as needed for Constipation.      sucralfate (CARAFATE) 1 gram tablet as needed.       trazodone (DESYREL) 100 MG tablet 1 and 1/2 talbet daily (Patient taking differently: 200 mg every evening. ) 135 tablet 3    UNABLE TO FIND medication name: cefaly + electrodes, wear 20-30 mins, Dx: migraine 1 Units 0    VITAMIN D2 50,000 unit capsule TAKE 1 CAPSULE BY MOUTH TWICE A WEEK 24 capsule 3    VOLTAREN 1 % Gel        No current facility-administered medications for  "this visit.        Family History   Problem Relation Age of Onset    Cancer Mother      colon; uterine    Nephrolithiasis Mother     Stroke Mother 86    Pancreatitis Brother     Vision loss Brother      macular degeneration    Diabetes Brother     Macular degeneration Brother     Migraines Sister     No Known Problems Father     No Known Problems Maternal Grandmother     No Known Problems Maternal Grandfather     No Known Problems Paternal Grandmother     No Known Problems Paternal Grandfather     No Known Problems Sister     No Known Problems Maternal Aunt     No Known Problems Maternal Uncle     No Known Problems Paternal Aunt     No Known Problems Paternal Uncle     Melanoma Neg Hx     Amblyopia Neg Hx     Blindness Neg Hx     Cataracts Neg Hx     Glaucoma Neg Hx     Hypertension Neg Hx     Retinal detachment Neg Hx     Strabismus Neg Hx     Thyroid disease Neg Hx        Social History     Social History    Marital status:      Spouse name: N/A    Number of children: N/A    Years of education: N/A     Occupational History    Psychotherpist       Social History Main Topics    Smoking status: Never Smoker    Smokeless tobacco: Never Used    Alcohol use Yes      Comment: occasion    Drug use: No    Sexual activity: Yes     Partners: Female     Other Topics Concern    Not on file     Social History Narrative       Objective:     Vitals:    02/08/17 0837   BP: 109/74   Pulse: 63   Temp: 98 °F (36.7 °C)   TempSrc: Oral   Weight: 79.8 kg (176 lb)   Height: 5' 8" (1.727 m)   PainSc:   6   PainLoc: Back     GEN:  Well developed, well nourished.  No acute distress. No pain behavior.  HEENT:  No trauma.  Mucous membranes moist.  Nares patent bilaterally.  PSYCH: Normal affect. Thought content appropriate.  CHEST:  Breathing symmetric.  No audible wheezing.  ABD: Soft, non-distended.  No rebound/guarding  SKIN:  Warm, pink, dry.  No rash on exposed areas.    EXT:  No " cyanosis, clubbing, or edema.  No color change or changes in nail or hair growth.  NEURO/MUSCULOSKELETAL:  Fully alert, oriented, and appropriate. Speech normal sudeep. No cranial nerve deficits.   Gait: Normal.   Negative SLR, ABDIEL on right. Intact sensation in distal dermatomes bilateral legs.  Motor Strength: 5/5 motor strength throughout lower extremities.         Imaging:      Narrative   Comparison with radiographs 7/29/15.  Postoperative vertebral plasty T12 vertebral body with mild residual compression fracture deformity.  Postop posterior spinal fusion with pedicular screws vertical stabilization bars L3, L4 and L4-L5 disk levels.  Anterior interbody disk spacers of this levels.  DJD SI joints.  Scattered calcified plaque aorta left renal artery origin, and both iliacs.  No hardware failure.  Surgical clips gallbladder bed.  Mild remote anterior wedge deformity T10 and L1 vertebral bodies.    At L3-L4 and L4-5 mild posterior spondylosis without spinal or foramen stenosis.  L5-S1 mild posterior disk bulge.  Facet joints minimal mild chronic change.      impression: One.  Status post posterior spinal fusion, anterior body disk spaces surgery L3 L4 and L4-5 levels.  Two.  Status post vertebral plasty therapy T12.  Three.  No disk prolapse, acute fracture subluxation noted.    ______________________________________     Electronically signed by: MONTY MENA MD  Date: 12/01/15  Time: 16:03        The imaging studies listed below were independently reviewed by me, and I agree with the findings as documented below.     Narrative   Three views    Postoperative changes of spinal fusion with associated pedicular screws identified at the L3/L4/L5 level.  The position and alignment is satisfactory.  Status post T12 vertebroplasty.   Impression    See above             Assessment:     Encounter Diagnoses   Name Primary?    DDD (degenerative disc disease), lumbar Yes    Spinal stenosis, lumbar region, without  neurogenic claudication     Right lumbar radiculopathy     Left lumbar radiculopathy       65 yo male with radicular low back pain along L4,5 and S1 distribution    Plan:     Raffy was seen today for low-back pain.    Diagnoses and all orders for this visit:    DDD (degenerative disc disease), lumbar    Spinal stenosis, lumbar region, without neurogenic claudication    Right lumbar radiculopathy    Left lumbar radiculopathy       Back pain is consistent with the above.  He has failed injections, PT, meds, and surgical interventions.  He is a good candidate from a medical standpoint for SCS.  If he fails SCS, he may be a candidate for the functional restoration program    We discussed the assessment and recommendations.  All available images were reviewed. We discussed the disease process, prognosis, treatment plan, and risks and benefits. The patient is aware of the risks and benefits of the medications being prescribed, common side effects, and proper usage. The following is the plan we agreed on:     1. Will schedule pt for SCS trial. Information given regarding [employer] product.  If successful, I will send him to Dr. Valeria Gross for paddle placement  2. He will also need psychologic eval prior to trail  3. Cont TENS unit and medication as prescribed.  4. RTC 6 days after trial for lead removal      The above plan and management options were discussed at length with patient. Patient is in agreement with the above and verbalized understanding. It will be communicated with the referring physician via electronic record, fax, or mail.    Kavin Oliveira Jr.   PM&R PGY-2    Greater than 25 minutes spent in total in todays visit with the patient, with more than half that time direct face to face counseling and education with the patient today. We discussed the disease process, prognosis, treatment plan, and risks and benefits.

## 2017-02-08 NOTE — MR AVS SNAPSHOT
[facility]  [address]  [place] [address]  Phone: [phone]  Fax: [phone]                  Raffy Rutherford Jr.   2017 8:30 AM   Office Visit    Description:  Male : 1950   Provider:  Leona Maya MD   Department:  [facility]           Reason for Visit     Low-back Pain                To Do List           Future Appointments        Provider Department Dept Phone    2017 10:30 AM Gerald Arriaga [facility] [phone]    2/15/2017 1:30 PM Brea Mott [facility] [phone] 11:30 AM MD Ian Nguyễn partha - Gastroenterology [phone]    2017 1:30 PM Kylee Cageehee [facility] [phone]    3/2/2017 2:00 PM LINDA Reyes - Urology [place] [phone]      Goals (5 Years of Data)     None      [facility]     [facility] Care [facility] -  Assistance  Registered nurses in the [facility] provide clinical advisement, health education, appointment booking, and other advisory services.  Call for this free service at [phone].             Medications           Message regarding Medications     Verify the changes and/or additions to your medication regime listed below are the same as discussed with your clinician today.  If any of these changes or additions are incorrect, please notify your healthcare provider.             Verify that the below list of medications is an accurate representation of the medications you are currently taking.  If none reported, the list may be blank. If incorrect, please contact your healthcare provider. Carry this list with you in case of emergency.           Current Medications     acetaZOLAMIDE (DIAMOX) 250 MG tablet     benzonatate (TESSALON) 100 MG capsule Take 200 mg by mouth 3 (three) times daily as needed.    clonazePAM (KLONOPIN) 0.5 MG tablet 1 mg.     cyanocobalamin  "1,000 mcg/mL injection     fish oil-omega-3 fatty acids 300-1,000 mg capsule Take 2 g by mouth once daily.    hydrocortisone-pramoxine (ANALPRAM-HC) 2.5-1 % Crea 2.5 mg.    ketoconazole (NIZORAL) 2 % cream Apply to affected area 2 times a day for 2 weeks    lamotrigine (LAMICTAL) 100 MG tablet Take 100 mg by mouth once daily.     lidocaine-prilocaine (EMLA) cream     meclizine (ANTIVERT) 25 mg tablet Take 1 tablet (25 mg total) by mouth every 6 (six) hours. Prn vertigo    rabeprazole (ACIPHEX) 20 mg tablet Take 1 tablet (20 mg total) by mouth 2 (two) times daily.    selegiline (EMSAM) 12 mg/24 hr Place 1 patch onto the skin once daily.    selegiline (EMSAM) 12 mg/24 hr Place 1 patch onto the skin once daily.    senna-docusate 8.6-50 mg (PERICOLACE) 8.6-50 mg per tablet Take 2 tablets by mouth nightly as needed for Constipation.    sucralfate (CARAFATE) 1 gram tablet as needed.     trazodone (DESYREL) 100 MG tablet 1 and 1/2 talbet daily    UNABLE TO FIND medication name: cefaly + electrodes, wear 20-30 mins, Dx: migraine    VITAMIN D2 50,000 unit capsule TAKE 1 CAPSULE BY MOUTH TWICE A WEEK    VOLTAREN 1 % Gel            Clinical Reference Information           Your Vitals Were     BP Pulse Temp Height Weight BMI    109/74 63 98 °F (36.7 °C) (Oral) 5' 8" (1.727 m) 79.8 kg (176 lb) 26.76 kg/m2      Blood Pressure          Most Recent Value    BP  109/74      Allergies as of 2/8/2017     Alphagan [Brimonidine]    Coumadin [Warfarin]    Oxycodone      Immunizations Administered on Date of Encounter - 2/8/2017     None      Language Assistance Services     ATTENTION: Language assistance services are available, free of charge. Please call [phone].      ATENCIÓN: Si habla kristina, tiene a sandoval disposición servicios gratuitos de asistencia lingüística. Llame al [phone].     CHÚ Ý: N?u b?n nói Ti?ng Vi?t, có các d?ch v? h? tr? ngôn ng? mi?n phí dành cho b?n. G?i s? [phone].         [facility] complies with applicable Federal civil rights laws and does not discriminate on the basis of race, color, national origin, age, disability, or sex.

## 2017-02-09 ENCOUNTER — TELEPHONE (OUTPATIENT)
Dept: UROLOGY | Facility: CLINIC | Age: 67
End: 2017-02-09

## 2017-02-09 DIAGNOSIS — R97.20 ELEVATED PSA: Primary | ICD-10-CM

## 2017-02-09 DIAGNOSIS — N40.1 BPH WITH URINARY OBSTRUCTION: ICD-10-CM

## 2017-02-09 DIAGNOSIS — N13.8 BPH WITH URINARY OBSTRUCTION: ICD-10-CM

## 2017-02-09 NOTE — TELEPHONE ENCOUNTER
PSA elevated to 7.2; options discussed  3.0 Angelika MRI of prostate at [facility]    I completed the necessary paperwork and faxed it over to [facility]

## 2017-02-12 ENCOUNTER — PATIENT MESSAGE (OUTPATIENT)
Dept: INTERNAL MEDICINE | Facility: CLINIC | Age: 67
End: 2017-02-12

## 2017-02-12 DIAGNOSIS — R26.9 GAIT ABNORMALITY: Primary | ICD-10-CM

## 2017-02-13 ENCOUNTER — TELEPHONE (OUTPATIENT)
Dept: INTERNAL MEDICINE | Facility: CLINIC | Age: 67
End: 2017-02-13

## 2017-02-13 RX ORDER — CLONAZEPAM 0.5 MG/1
1 TABLET ORAL DAILY
Qty: 180 TABLET | Refills: 3 | Status: SHIPPED | OUTPATIENT
Start: 2017-02-13 | End: 2017-08-18 | Stop reason: SDUPTHER

## 2017-02-13 RX ORDER — TRAZODONE HYDROCHLORIDE 100 MG/1
TABLET ORAL
Qty: 135 TABLET | Refills: 3 | Status: CANCELLED | OUTPATIENT
Start: 2017-02-13

## 2017-02-13 RX ORDER — LAMOTRIGINE 100 MG/1
100 TABLET ORAL DAILY
Status: CANCELLED | OUTPATIENT
Start: 2017-02-13

## 2017-02-13 RX ORDER — LAMOTRIGINE 100 MG/1
100 TABLET ORAL DAILY
Qty: 90 TABLET | Refills: 3 | Status: SHIPPED | OUTPATIENT
Start: 2017-02-13 | End: 2018-07-09

## 2017-02-13 RX ORDER — CLONAZEPAM 0.5 MG/1
1 TABLET ORAL
Status: CANCELLED | OUTPATIENT
Start: 2017-02-13

## 2017-02-13 RX ORDER — TRAZODONE HYDROCHLORIDE 100 MG/1
200 TABLET ORAL NIGHTLY
Qty: 180 TABLET | Refills: 3 | Status: SHIPPED | OUTPATIENT
Start: 2017-02-13 | End: 2018-10-22

## 2017-02-13 RX ORDER — PRAVASTATIN SODIUM 40 MG/1
40 TABLET ORAL DAILY
Qty: 90 TABLET | Refills: 3 | Status: SHIPPED | OUTPATIENT
Start: 2017-02-13 | End: 2018-06-05

## 2017-02-14 ENCOUNTER — TELEPHONE (OUTPATIENT)
Dept: INTERNAL MEDICINE | Facility: CLINIC | Age: 67
End: 2017-02-14

## 2017-02-14 DIAGNOSIS — G47.33 OBSTRUCTIVE SLEEP APNEA SYNDROME: Primary | ICD-10-CM

## 2017-02-15 ENCOUNTER — DOCUMENTATION ONLY (OUTPATIENT)
Dept: REHABILITATION | Facility: OTHER | Age: 67
End: 2017-02-15
Attending: PHYSICAL MEDICINE & REHABILITATION
Payer: COMMERCIAL

## 2017-02-15 NOTE — PROGRESS NOTES
Health  Wellness Visit Note    Name: Raffy Rutherford Jr.  Clinic Number: [id number]  Physician: Jessy García, *  Diagnosis: No diagnosis found.  Past Medical History   Diagnosis Date    Adenomatous polyp 2003    Adenomatous polyp     Allergy     Arthritis     Back pain      after trauma beginning in 195    Chronic pain      neck and left shoulder    Cluster headache 2013    Colon polyp     Degenerative disc disease     Depression     Diverticulitis 12/2013    Elevated PSA     Fibromyalgia 2013    GERD (gastroesophageal reflux disease)     Hepatitis 1970's     A    History of prostate biopsy 2002    Hyperlipidemia     Joint pain     Sleep apnea     Special screening for malignant neoplasms, colon 5/5/2014    Thyroid nodule 7/16/2014    Visual disturbance 2012     problems after cataract surgery     Visit Number: 46  Precautions: Standard  Time In: 1:40 PM  Time Out: 2:29 PM  Total Treatment Time: 49 minutes    Subjective:   Patient reports that he continues to not have any neck pain or symptoms; still having pain in his lower back but no pain currently ; back pain is described as intermittent and normally occurs towards the end of the day(right shin/calf muscle)-intensity of pain primarily 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    Objective:   Raffy completed therapeutic stretches (EIL, Neck Retractions) and the following MedX exercise machines: core cervical, core lumbar, leg extension, leg curl, upright row, chest press, biceps curl, triceps extension,  leg press    Please see exercise log in patient folder for rate of exertion and repetitions completed.     Assessment:   Patient tolerated all exercises listed above on MedX equipment without any increase in symptoms; weights were increased on all upper and lower body exercises except for the bicep curl and were tolerated without complaint (weights will increase again next week for same machines);Iced neck, lower back, and right shin after completion of exercises.     Plan:  Continue with established plan of care towards wellness goals.   Health : Brea Mott  12/28/2016

## 2017-02-16 ENCOUNTER — PATIENT MESSAGE (OUTPATIENT)
Dept: INTERNAL MEDICINE | Facility: CLINIC | Age: 67
End: 2017-02-16

## 2017-02-21 ENCOUNTER — TELEPHONE (OUTPATIENT)
Dept: UROLOGY | Facility: CLINIC | Age: 67
End: 2017-02-21

## 2017-02-21 ENCOUNTER — CLINICAL SUPPORT (OUTPATIENT)
Dept: REHABILITATION | Facility: HOSPITAL | Age: 67
End: 2017-02-21
Attending: INTERNAL MEDICINE
Payer: COMMERCIAL

## 2017-02-21 DIAGNOSIS — R26.9 GAIT ABNORMALITY: Primary | ICD-10-CM

## 2017-02-21 PROCEDURE — 97162 PT EVAL MOD COMPLEX 30 MIN: CPT

## 2017-02-21 PROCEDURE — 97112 NEUROMUSCULAR REEDUCATION: CPT

## 2017-02-21 NOTE — TELEPHONE ENCOUNTER
----- Message from Cynthia Eddie sent at 2/21/2017  2:25 PM CST -----  Contact: [facility]/diagnostic imaging service - [phone] ext [phone]  Christian - needs authorization for his visit - please call [facility]/diagnostic imaging service - [phone] ext [phone]

## 2017-02-21 NOTE — PROGRESS NOTES
Physical Therapy Initial Evaluation     Name: Raffy Rutherford Jr.  Clinic Number: [id number]    Diagnosis:   Encounter Diagnosis   Name Primary?    Gait abnormality Yes     Physician: Nini Rendon MD  Treatment Orders: PT Eval and Treat  Past Medical History   Diagnosis Date    Adenomatous polyp 2003    Adenomatous polyp     Allergy     Arthritis     Back pain      after trauma beginning in 195    Chronic pain      neck and left shoulder    Cluster headache 2013    Colon polyp     Degenerative disc disease     Depression     Diverticulitis 12/2013    Elevated PSA     Fibromyalgia 2013    GERD (gastroesophageal reflux disease)     Hepatitis 1970's     A    History of prostate biopsy 2002    Hyperlipidemia     Joint pain     Sleep apnea     Special screening for malignant neoplasms, colon 5/5/2014    Thyroid nodule 7/16/2014    Visual disturbance 2012     problems after cataract surgery     Current Outpatient Prescriptions   Medication Sig    acetaZOLAMIDE (DIAMOX) 250 MG tablet     benzonatate (TESSALON) 100 MG capsule Take 200 mg by mouth 3 (three) times daily as needed.    clonazePAM (KLONOPIN) 0.5 MG tablet Take 2 tablets (1 mg total) by mouth once daily.    cyanocobalamin 1,000 mcg/mL injection     fish oil-omega-3 fatty acids 300-1,000 mg capsule Take 2 g by mouth once daily.    hydrocortisone-pramoxine (ANALPRAM-HC) 2.5-1 % Crea 2.5 mg.    ketoconazole (NIZORAL) 2 % cream Apply to affected area 2 times a day for 2 weeks    lamotrigine (LAMICTAL) 100 MG tablet Take 1 tablet (100 mg total) by mouth once daily.    lidocaine-prilocaine (EMLA) cream     meclizine (ANTIVERT) 25 mg tablet Take 1 tablet (25 mg total) by mouth every 6 (six) hours. Prn vertigo (Patient taking differently: Take 25 mg by mouth every 6 (six) hours as needed. Prn vertigo)    pravastatin (PRAVACHOL) 40 MG tablet Take 1 tablet (40 mg total) by mouth once  daily.    rabeprazole (ACIPHEX) 20 mg tablet Take 1 tablet (20 mg total) by mouth 2 (two) times daily. (Patient taking differently: Take 20 mg by mouth once daily. )    selegiline (EMSAM) 12 mg/24 hr Place 1 patch onto the skin once daily.    selegiline (EMSAM) 12 mg/24 hr Place 1 patch onto the skin once daily.    senna-docusate 8.6-50 mg (PERICOLACE) 8.6-50 mg per tablet Take 2 tablets by mouth nightly as needed for Constipation.    sucralfate (CARAFATE) 1 gram tablet as needed.     trazodone (DESYREL) 100 MG tablet Take 2 tablets (200 mg total) by mouth every evening.    UNABLE TO FIND medication name: cefaly + electrodes, wear 20-30 mins, Dx: migraine    VITAMIN D2 50,000 unit capsule TAKE 1 CAPSULE BY MOUTH TWICE A WEEK    VOLTAREN 1 % Gel      No current facility-administered medications for this visit.      Review of patient's allergies indicates:   Allergen Reactions    Alphagan [brimonidine]      Patient taking MASO-B Selective Inhibitor Selegiline (Emsam)    Coumadin [warfarin]      itch    Oxycodone      hiccups       Evaluation Date: 2/21/2017   Visit #: 1  Visit # authorized: 20  Authorization period: 12/31/17  Plan of care Expiration: 6 weeks - April 4, 2017  MD referral: Nini Rendon MD    Subjective     Patient states:  Pt is a 67 yo male that presents to outpatient therapy with a dx of gait abnormality. Pt is currently in the wellness program at [facility] at [facility] - recently been treated for his back pain. He has an extensive surgical history: L hip replacement 2012, BL knee scopes approx 20 years ago, Laminectomy 2012, s/p L3-4 and L4-5 x lift in June 2015 and T 12 vertebroplasty approx 6 years ago. He is participating in the Wellness program 1x week and works out with a  at [facility] 2x week to increase strength in his calf, legs and maintain decreased pain in his back. His goals for being seen today are to work on his gait, balance and decrease risk for falls.  "  Pt states that after his surgery his still has numbness down his R leg and sometimes feels like his knees are "going to buckle". He feels his gait is not getting any better since the sx. He feel 2-3 mo ago and then has near falls everyday but seem to be more in the evening.   Falls: 2-3 months ago - R>L feels week and knee sumit, fall forward  Near falls: increased, 2-3 x day, maybe more in the evening     Pain Scale: Raffy rates pain on a scale of 0-10 to be 8 at worst; 7 currently; 0 at best  In his back and down into his legs BL R>L  Pattern of pain questions:  1.  Where is your pain the worst? sitting  2.  Is your pain constant or intermittent? intermittent  3.  Does bending forward make your typical pain worse? No, but does have an preference for extension patterns  4.  Since the start of your back pain, has there been a change in your bowel or bladder? no  5.  What can't you do now that you use to be able to do? 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                       Pts goals:  Improve gait, decrease pain in back and R leg, improve balance     Objective     Posture Alignment: slouched posture    RANGE OF MOTION--LOWER EXTREMITIES(R) LE:   limited as follows:      Left   Right  Ankle DF -3 degrees PROM -5 degrees PROM   Ankle PF 40 PROM 30 PROM   Ankle INv 35 AROM 35   Ankle Ev 30 PROM 30 PROM   Hamstrings - measured 90/90 55 degrees PROM 35 degrees PROM           LOWER EXTREMITY STRENGTH:   Left Right   Quadriceps 5/5 4+/5   Hamstrings " 4+/5 4+/5     Iliopsoas 4+/5 4/5   PGM 4+/5 4/5   Hip IR 4-/5 4-/5   Hip ER 4/5 4-/5   Hip Ext 4+/5 4/5   Ankle DF 3-/5 3-/5   Ankle PF 3/5 3-/5   Ankle INv 3/5 3/5   Ankle Ev 3-/5 3-/5       Dermatomes: Sensation: Light Touch: Impaired: down L4-5, S1 distribution - burning and tingling on BL feet from balls of feet into toes    Balance and Gait Outcome measure testing:    Evaluation   Timed Up and Go 12.46 sec  - indicates impaired community ambulation   Single Limb Stance R LE 1 sec - indicates at risk for falls   Single Limb Stance L LE 1 sec - indicates at risk for falls    Self Selected Walking Speed Not tested   Fast Walking Speed Not tested   30 second Chair Rise  not tested         GAIT: Raffy ambulates with no assistive device with independence    GAIT DEVIATIONS: Raffy displays unsteady gait - the following deviations were observed  - decreased arm swing on R  - foot flat contact BL  - increased time spend in Double limb support  - increased hip flexion in swing BL needed to clear limb  - limited DF at initial contact    Pt/family was provided educational information, including: role of PT, goals for PT, scheduling - pt verbalized understanding. Discussed insurance limitations with pt.     TREATMENT     Time In: 11:45am  Time Out: 12:15pm    PT Evaluation Completed? Yes  Discussed Plan of Care with patient: Yes    Raffy received neuromuscular re-education for 30 min minutes of therapeutic exercise & instruction including:  neuromuscular re-education, postural education, functional gait, balance and activity training utilizing amplitude based training.  Extensive patient education and HEP to continue the balance program at home - exercises consisted of:     Multidirectional Repetitive Movements (standing) - 10 reps each side   1. Step and reach - R and L leg forward - modified to use chair  2. Sideways step and reach with both arms extended to side - to R and L side - chair needed  3. Backwards step and  push both arms backwards to the side - chair required for balance    Rock and Reach   4. Forwards/Backwards   - right leg forward, left leg forward with arm swings     Written Home Exercises Provided: exercises above in neuromuscular re-ed given for HEP - handouts provided  Raffy garzon good understanding of the education provided. Patient demo good return demo of skill of exercises.    Functional Limitations Reports - G Codes  Category: Mobility  Tool: FOTO Lumbar Survey  Score: 51% Limitation   Modifier  Impairment Limitation Restriction    CH  0 % impaired, limited or restricted    CI  @ least 1% but less than 20% impaired, limited or restricted    CJ  @ least 20%<40% impaired, limited or restricted    CK  @ least 40%<60% impaired, limited or restricted    CL  @ least 60% <80% impaired, limited or restricted    CM  @ least 80%<100% impaired limited or restricted    CN  100% impaired, limited or restricted     Current/: CK = 40-60% Limitation   Goal/ : CK = 40-60 % Limitation      Assessment   Patient is a 67 yo male that presents to OP PT with dx of gait abnormality. Pt has extensive h/o back pain and several surgeries and has been experiencing gait deficits and near falls for the past few years. Pt presents with back and R leg pain, decreased ankle ROM and impaired strength in ankles BL. Also, patient presents with balance deficits and abnormality of gait. Pt prognosis is Fair.  Pt will benefit from skilled outpatient physical therapy to address the above stated deficits, provide pt/family education and to maximize pt's level of independence.     Medical necessity is demonstrated by the following IMPAIRMENTS/PROBLEMS:  History  Co-morbidities and personal factors that may impact the plan of care Examination  Body Structures and Functions, activity limitations and participation restrictions that may impact the plan of care Clinical Presentation   Decision Making/ Complexity Score   Co-morbidities:   -  s/p L3-4, L4-5 x lift  - T12 vertebroplasty  - laminectomy       Personal Factors:    Body Regions:  - back  -LEs    Body Systems:   Musculoskeletal  - decreased ROM in ankles BL  - decreased strength in ankles BL  Neuromuscular  - impaired balance   - history of falls  - impaired sensation in feet BL    Activity limitations:   Mobility  - difficulty lifting and carrying objects  - gait abnormality  - driving is painful  - difficulty with steps    Participation Restrictions:   - unable to work full day - 8 hours without pain  - pain limits his activity level often        Evolving clinical presentation with changing clinical characteristics    Pain levels inconsistent Moderate     Pt's spiritual, cultural and educational needs considered and pt agreeable to plan of care and goals as stated below:     Anticipated Barriers for physical therapy: none    Short Term GOALS: 3 weeks. Pt agrees with goals set.  1. Pt able to perform HEP correctly with minimal cueing or supervision for therapist  2. Patient report a reduction in near falls to 2x day.   3. Pt will improve SLS BL to 3 secs without UE support to reduce risk for falls.     Long Term GOALS: 6 weeks. Pt agrees with goals set.  1. Pt will report a decrease in near falls to 1-2 per week at discharge.   2. Patient will improve TUG score by 2 secs to decreased risk for falls and improve overall community ambulation.   3. Patient demonstrates improved overall function per FOTO Lumbar Survey to 40 % Limitation or  less.   4. Pt will improve SLS BL to 5 secs without UE support to reduce risk for falls.     PLAN     Outpatient physical therapy 1-2  times weekly to include: pt ed, hep, therapeutic exercises, neuromuscular re-education/ balance exercises, manual therapy. . Cont PT for  6 weeks. Pt may be seen by PTA as part of the rehabilitation team. Certification period through April 4 , 2017.     Therapist: Yi Lind, MARCELA  2/21/2017

## 2017-02-21 NOTE — TELEPHONE ENCOUNTER
Tonya with [facility] calling to get auth for pt's MRI scheduled for tomorrow. Message left with Pre-service ex. [phone] to get MRI cleared.

## 2017-02-23 ENCOUNTER — CLINICAL SUPPORT (OUTPATIENT)
Dept: REHABILITATION | Facility: HOSPITAL | Age: 67
End: 2017-02-23
Attending: INTERNAL MEDICINE
Payer: COMMERCIAL

## 2017-02-23 DIAGNOSIS — R26.9 GAIT ABNORMALITY: Primary | ICD-10-CM

## 2017-02-23 DIAGNOSIS — Z74.09 IMPAIRED FUNCTIONAL MOBILITY, BALANCE, GAIT, AND ENDURANCE: ICD-10-CM

## 2017-02-23 PROCEDURE — 97112 NEUROMUSCULAR REEDUCATION: CPT

## 2017-02-23 PROCEDURE — 97110 THERAPEUTIC EXERCISES: CPT

## 2017-02-24 PROBLEM — Z74.09 IMPAIRED FUNCTIONAL MOBILITY, BALANCE, GAIT, AND ENDURANCE: Status: ACTIVE | Noted: 2017-02-24

## 2017-02-24 NOTE — PROGRESS NOTES
Physical Therapy Initial Evaluation     Name: Raffy Rutherford Jr.  Clinic Number: [id number]    Diagnosis:   Encounter Diagnoses   Name Primary?    Gait abnormality Yes    Impaired functional mobility, balance, gait, and endurance      Physician: Nini Rendon MD  Treatment Orders: PT Eval and Treat  Past Medical History:   Diagnosis Date    Adenomatous polyp 2003    Adenomatous polyp     Allergy     Arthritis     Back pain     after trauma beginning in 195    Chronic pain     neck and left shoulder    Cluster headache 2013    Colon polyp     Degenerative disc disease     Depression     Diverticulitis 12/2013    Elevated PSA     Fibromyalgia 2013    GERD (gastroesophageal reflux disease)     Hepatitis 1970's    A    History of prostate biopsy 2002    Hyperlipidemia     Joint pain     Sleep apnea     Special screening for malignant neoplasms, colon 5/5/2014    Thyroid nodule 7/16/2014    Visual disturbance 2012    problems after cataract surgery     Current Outpatient Prescriptions   Medication Sig    acetaZOLAMIDE (DIAMOX) 250 MG tablet     benzonatate (TESSALON) 100 MG capsule Take 200 mg by mouth 3 (three) times daily as needed.    clonazePAM (KLONOPIN) 0.5 MG tablet Take 2 tablets (1 mg total) by mouth once daily.    cyanocobalamin 1,000 mcg/mL injection     fish oil-omega-3 fatty acids 300-1,000 mg capsule Take 2 g by mouth once daily.    hydrocortisone-pramoxine (ANALPRAM-HC) 2.5-1 % Crea 2.5 mg.    ketoconazole (NIZORAL) 2 % cream Apply to affected area 2 times a day for 2 weeks    lamotrigine (LAMICTAL) 100 MG tablet Take 1 tablet (100 mg total) by mouth once daily.    lidocaine-prilocaine (EMLA) cream     meclizine (ANTIVERT) 25 mg tablet Take 1 tablet (25 mg total) by mouth every 6 (six) hours. Prn vertigo (Patient taking differently: Take 25 mg by mouth every 6 (six) hours as needed. Prn vertigo)    pravastatin  "(PRAVACHOL) 40 MG tablet Take 1 tablet (40 mg total) by mouth once daily.    rabeprazole (ACIPHEX) 20 mg tablet Take 1 tablet (20 mg total) by mouth 2 (two) times daily. (Patient taking differently: Take 20 mg by mouth once daily. )    selegiline (EMSAM) 12 mg/24 hr Place 1 patch onto the skin once daily.    selegiline (EMSAM) 12 mg/24 hr Place 1 patch onto the skin once daily.    senna-docusate 8.6-50 mg (PERICOLACE) 8.6-50 mg per tablet Take 2 tablets by mouth nightly as needed for Constipation.    sucralfate (CARAFATE) 1 gram tablet as needed.     trazodone (DESYREL) 100 MG tablet Take 2 tablets (200 mg total) by mouth every evening.    UNABLE TO FIND medication name: cefaly + electrodes, wear 20-30 mins, Dx: migraine    VITAMIN D2 50,000 unit capsule TAKE 1 CAPSULE BY MOUTH TWICE A WEEK    VOLTAREN 1 % Gel      No current facility-administered medications for this visit.      Review of patient's allergies indicates:   Allergen Reactions    Alphagan [brimonidine]      Patient taking MASO-B Selective Inhibitor Selegiline (Emsam)    Coumadin [warfarin]      itch    Oxycodone      hiccups       Evaluation Date: 2/24/2017   Visit #: 2  Visit # authorized: 20  Authorization period: 12/31/17  Plan of care Expiration: 6 weeks - April 4, 2017  MD referral: Nini Rendon MD  Time in: 2:05 pm  Time out: 3:05pm    Subjective     Patient states:  "I just finished working out with my . We did mostly upper body so I could come and work on balance today in therapy" Pt states his pain is much better today and he has been compliant with HEP.   Pain Scale: Raffy rates pain on a scale of 0-10 to be 3/10 in back and down into his legs BL R>L      Objective   Pt received individual therapeutic exercises to develop strength, endurance, ROM, flexibility and posture for 15 minutes including:  Recumbent bike for ROM, LE strength for 6 min - 3.0 resistance  Manual hamstring stretching - contract relax) 5 min  Manual " therapy: gentle mobs to BL ankles - posterior glides to increase DF ROM    Raffy received neuromuscular re-education for 40 min minutes of therapeutic exercise & instruction including:  neuromuscular re-education, postural education, functional gait, balance and activity training utilizing amplitude based exercises.  Extensive patient education and HEP to continue the balance program at home - exercises consisted of:     Multidirectional Repetitive Movements (standing) - 15 reps each side   1. Step and reach - R and L leg forward - modified to use chair  2. Sideways step and reach with both arms extended to side - to R and L side - chair needed  3. Backwards step and push both arms backwards to the side - chair required for balance    Rock and Reach   4. Forwards/Backwards   - right leg forward, left leg forward with arm swings     Side to Side   5. twist to right and left with support cued to make full turn back to start.   6. Sit to stand with forward weight shift    Written Home Exercises Provided: HEP as described above - in neuomuscular re-education section  Raffy demo good understanding of the education provided. Patient demo good return demo of skill of exercises.    Assessment   Pt tolerated treatment well and has been compliant with HEP given at [facility]. Added new exercises to his program and pt is able to perform his exercises with less chair support and more upright posture than when previously observed. Pt making progress towards goals. Spoke with pt about exploring AFO for R foot to assist with dorsiflexion at initial contact of gait cycle to decreased risk for falls and improve floor clearance. Simulated AFO applied during treatment and pt was able to walk with less abnormality.   Pt prognosis is Fair.  Pt will benefit from skilled outpatient physical therapy to address the below stated deficits, provide pt/family education and to maximize pt's level of independence.     Medical necessity is  demonstrated by the following IMPAIRMENTS/PROBLEMS:  History  Co-morbidities and personal factors that may impact the plan of care Examination  Body Structures and Functions, activity limitations and participation restrictions that may impact the plan of care Clinical Presentation   Decision Making/ Complexity Score   Co-morbidities:   - s/p L3-4, L4-5 x lift  - T12 vertebroplasty  - laminectomy       Personal Factors:    Body Regions:  - back  -LEs    Body Systems:   Musculoskeletal  - decreased ROM in ankles BL  - decreased strength in ankles BL  Neuromuscular  - impaired balance   - history of falls  - impaired sensation in feet BL    Activity limitations:   Mobility  - difficulty lifting and carrying objects  - gait abnormality  - driving is painful  - difficulty with steps    Participation Restrictions:   - unable to work full day - 8 hours without pain  - pain limits his activity level often        Evolving clinical presentation with changing clinical characteristics    Pain levels inconsistent Moderate     Pt's spiritual, cultural and educational needs considered and pt agreeable to plan of care and goals as stated below:     Anticipated Barriers for physical therapy: none    Short Term GOALS: 3 weeks. Pt agrees with goals set.  1. Pt able to perform HEP correctly with minimal cueing or supervision for therapist (progressing, not)  2. Patient report a reduction in near falls to 2x day. (progressing, not)  3. Pt will improve SLS BL to 3 secs without UE support to reduce risk for falls. (progressing, not)    Long Term GOALS: 6 weeks. Pt agrees with goals set.  1. Pt will report a decrease in near falls to 1-2 per week at discharge. (progressing, not)  2. Patient will improve TUG score by 2 secs to decreased risk for falls and improve overall community ambulation. (progressing, not)  3. Patient demonstrates improved overall function per FOTO Lumbar Survey to 40 % Limitation or  less. (progressing, not)  4. Pt  will improve SLS BL to 5 secs without UE support to reduce risk for falls. (progressing, not)    PLAN     Outpatient physical therapy 1-2  times weekly to include: pt ed, hep, therapeutic exercises, neuromuscular re-education/ balance exercises, manual therapy. . Cont PT for  6 weeks. Pt may be seen by PTA as part of the rehabilitation team. Certification period through April 4 , 2017.     Therapist: Yi Lind, PT  2/24/2017

## 2017-03-01 ENCOUNTER — DOCUMENTATION ONLY (OUTPATIENT)
Dept: REHABILITATION | Facility: OTHER | Age: 67
End: 2017-03-01
Attending: PHYSICAL MEDICINE & REHABILITATION
Payer: COMMERCIAL

## 2017-03-01 NOTE — PROGRESS NOTES
"Health  Wellness Visit Note    Name: Raffy Rutherford Jr.  Clinic Number: [id number]  Physician: Jessy García, *  Diagnosis: No diagnosis found.  Past Medical History:   Diagnosis Date    Adenomatous polyp 2003    Adenomatous polyp     Allergy     Arthritis     Back pain     after trauma beginning in 195    Chronic pain     neck and left shoulder    Cluster headache 2013    Colon polyp     Degenerative disc disease     Depression     Diverticulitis 12/2013    Elevated PSA     Fibromyalgia 2013    GERD (gastroesophageal reflux disease)     Hepatitis 1970's    A    History of prostate biopsy 2002    Hyperlipidemia     Joint pain     Sleep apnea     Special screening for malignant neoplasms, colon 5/5/2014    Thyroid nodule 7/16/2014    Visual disturbance 2012    problems after cataract surgery     Visit Number: 46  Precautions: Standard  Time In: 1:30 PM  Time Out: 2:20 PM  Total Treatment Time: 50 minutes    Subjective:   Patient reports that his lower is feeling okay today; still having occasional, intermittent pain along with radicular symptoms as well; patient believes that his radicular symptoms could be "genetic" and not completely linked to his lower back; continuing to have pain in his calf muscle as well; patient says his neck is feeling fine as well-not having any neck pain presently; Raffy is also attending physical therapy to address his gait abnormality-had his first visit last week and says everything is going well; still exercising with his  at [facility]-says that he has not been trying to incorporate more protein into his diet (has just forgotten).    Objective:   Raffy completed therapeutic stretches (EIL, Neck Retractions) and the following MedX exercise machines: core cervical, core lumbar, leg extension, leg curl, upright row, chest press, biceps curl, triceps extension, leg press    Please see exercise log in patient folder for rate of exertion " and repetitions completed.     Assessment:   Patient tolerated all exercises listed above on MedX equipment without any increase in symptoms; Iced neck and lower back after completion of exercises.     Plan:  Continue with established plan of care towards wellness goals.   Health : Brea Mott  12/28/2016

## 2017-03-02 ENCOUNTER — CLINICAL SUPPORT (OUTPATIENT)
Dept: REHABILITATION | Facility: HOSPITAL | Age: 67
End: 2017-03-02
Attending: INTERNAL MEDICINE
Payer: COMMERCIAL

## 2017-03-02 DIAGNOSIS — Z74.09 IMPAIRED FUNCTIONAL MOBILITY, BALANCE, GAIT, AND ENDURANCE: ICD-10-CM

## 2017-03-02 DIAGNOSIS — R26.9 GAIT ABNORMALITY: Primary | ICD-10-CM

## 2017-03-02 PROCEDURE — 97112 NEUROMUSCULAR REEDUCATION: CPT

## 2017-03-02 PROCEDURE — 97110 THERAPEUTIC EXERCISES: CPT

## 2017-03-02 NOTE — PROGRESS NOTES
Physical Therapy Initial Evaluation     Name: Raffy Rutherford Jr.  Clinic Number: [id number]    Diagnosis:   Encounter Diagnoses   Name Primary?    Gait abnormality Yes    Impaired functional mobility, balance, gait, and endurance      Physician: Nini Rendon MD  Treatment Orders: PT Eval and Treat  Past Medical History:   Diagnosis Date    Adenomatous polyp 2003    Adenomatous polyp     Allergy     Arthritis     Back pain     after trauma beginning in 195    Chronic pain     neck and left shoulder    Cluster headache 2013    Colon polyp     Degenerative disc disease     Depression     Diverticulitis 12/2013    Elevated PSA     Fibromyalgia 2013    GERD (gastroesophageal reflux disease)     Hepatitis 1970's    A    History of prostate biopsy 2002    Hyperlipidemia     Joint pain     Sleep apnea     Special screening for malignant neoplasms, colon 5/5/2014    Thyroid nodule 7/16/2014    Visual disturbance 2012    problems after cataract surgery     Current Outpatient Prescriptions   Medication Sig    acetaZOLAMIDE (DIAMOX) 250 MG tablet     benzonatate (TESSALON) 100 MG capsule Take 200 mg by mouth 3 (three) times daily as needed.    clonazePAM (KLONOPIN) 0.5 MG tablet Take 2 tablets (1 mg total) by mouth once daily.    cyanocobalamin 1,000 mcg/mL injection     fish oil-omega-3 fatty acids 300-1,000 mg capsule Take 2 g by mouth once daily.    hydrocortisone-pramoxine (ANALPRAM-HC) 2.5-1 % Crea 2.5 mg.    ketoconazole (NIZORAL) 2 % cream Apply to affected area 2 times a day for 2 weeks    lamotrigine (LAMICTAL) 100 MG tablet Take 1 tablet (100 mg total) by mouth once daily.    lidocaine-prilocaine (EMLA) cream     meclizine (ANTIVERT) 25 mg tablet Take 1 tablet (25 mg total) by mouth every 6 (six) hours. Prn vertigo (Patient taking differently: Take 25 mg by mouth every 6 (six) hours as needed. Prn vertigo)    pravastatin  "(PRAVACHOL) 40 MG tablet Take 1 tablet (40 mg total) by mouth once daily.    rabeprazole (ACIPHEX) 20 mg tablet Take 1 tablet (20 mg total) by mouth 2 (two) times daily. (Patient taking differently: Take 20 mg by mouth once daily. )    selegiline (EMSAM) 12 mg/24 hr Place 1 patch onto the skin once daily.    selegiline (EMSAM) 12 mg/24 hr Place 1 patch onto the skin once daily.    senna-docusate 8.6-50 mg (PERICOLACE) 8.6-50 mg per tablet Take 2 tablets by mouth nightly as needed for Constipation.    sucralfate (CARAFATE) 1 gram tablet as needed.     trazodone (DESYREL) 100 MG tablet Take 2 tablets (200 mg total) by mouth every evening.    UNABLE TO FIND medication name: cefaly + electrodes, wear 20-30 mins, Dx: migraine    VITAMIN D2 50,000 unit capsule TAKE 1 CAPSULE BY MOUTH TWICE A WEEK    VOLTAREN 1 % Gel      No current facility-administered medications for this visit.      Review of patient's allergies indicates:   Allergen Reactions    Alphagan [brimonidine]      Patient taking MASO-B Selective Inhibitor Selegiline (Emsam)    Coumadin [warfarin]      itch    Oxycodone      hiccups       Evaluation Date: 3/2/2017   Visit #: 3  Visit # authorized: 20  Authorization period: 12/31/17  Plan of care Expiration: 6 weeks - April 4, 2017  MD referral: Nini Rendon MD  Time in: 2:05 pm  Time out: 3:05pm    Subjective     Patient states:  " I strained my groin doing an exercise last week. I have been doing my exercises but could do them more".   Pt brought in [place] Foot up brace that he has and wanted an opinion on if he should use it or not. Pt demonstrated increased DF at initial contact.   Pain Scale: Raffy rates pain on a scale of 0-10 to be 3/10 in back and down into his legs BL R>L    Objective   Pt received individual therapeutic exercises to develop strength, endurance, ROM, flexibility and posture for 15 minutes including:  Recumbent bike for ROM, LE strength for 6 min - 3.0 resistance  Manual " hamstring stretching - contract relax) 5 min  Standing heel raises - leaning onto mat to do it with assistance  IT band rolling - roll  Opposite knee to chest BL 3 x 30 sec hold    Raffy received neuromuscular re-education for 45 min minutes of therapeutic exercise & instruction including:  neuromuscular re-education, postural education, functional gait, balance and activity training utilizing amplitude based exercises.  Extensive patient education and HEP to continue the balance program at home - exercises consisted of:     Multidirectional Repetitive Movements (standing) - 15 reps each side   1. Step and reach - R and L leg forward - modified to use chair  2. Sideways step and reach with both arms extended to side - to R and L side - chair needed  3. Backwards step and push both arms backwards to the side - chair required for balance    Rock and Reach   4. Forwards/Backwards   - right leg forward, left leg forward with arm swings     Side to Side   5. twist to right and left with support cued to make full turn back to start.   6. Sit to stand with forward weight shift    Balance exercises:  SLS - on ground and foam  Walking with head turns as well at catching ball to challenge balance reactions  Marching in standing catching ball    Written Home Exercises Provided: HEP as described above - in neuomuscular re-education section  Raffy demo good understanding of the education provided. Patient demo good return demo of skill of exercises.    Assessment   Pt tolerated treatment well and has been compliant with HEP given at [facility]. Pt is making good progress with gait and balance, able to perform SLS for 3 sec each leg. Also, able to correct his foot flat contact more than at [facility]. He reports that the exercises are making him feel more secure with his balance. Pt has not met any goals this session however is progressing well. Pt prognosis is Fair.  Pt will benefit from skilled outpatient physical therapy to address the  below stated deficits, provide pt/family education and to maximize pt's level of independence.     Medical necessity is demonstrated by the following IMPAIRMENTS/PROBLEMS:  History  Co-morbidities and personal factors that may impact the plan of care Examination  Body Structures and Functions, activity limitations and participation restrictions that may impact the plan of care Clinical Presentation   Decision Making/ Complexity Score   Co-morbidities:   - s/p L3-4, L4-5 x lift  - T12 vertebroplasty  - laminectomy       Personal Factors:    Body Regions:  - back  -LEs    Body Systems:   Musculoskeletal  - decreased ROM in ankles BL  - decreased strength in ankles BL  Neuromuscular  - impaired balance   - history of falls  - impaired sensation in feet BL    Activity limitations:   Mobility  - difficulty lifting and carrying objects  - gait abnormality  - driving is painful  - difficulty with steps    Participation Restrictions:   - unable to work full day - 8 hours without pain  - pain limits his activity level often        Evolving clinical presentation with changing clinical characteristics    Pain levels inconsistent Moderate     Pt's spiritual, cultural and educational needs considered and pt agreeable to plan of care and goals as stated below:     Anticipated Barriers for physical therapy: none    Short Term GOALS: 3 weeks. Pt agrees with goals set.  1. Pt able to perform HEP correctly with minimal cueing or supervision for therapist (progressing, not)  2. Patient report a reduction in near falls to 2x day. (progressing, not)  3. Pt will improve SLS BL to 3 secs without UE support to reduce risk for falls. (progressing, not)    Long Term GOALS: 6 weeks. Pt agrees with goals set.  1. Pt will report a decrease in near falls to 1-2 per week at discharge. (progressing, not)  2. Patient will improve TUG score by 2 secs to decreased risk for falls and improve overall community ambulation. (progressing, not)  3. Patient  demonstrates improved overall function per FOTO Lumbar Survey to 40 % Limitation or  less. (progressing, not)  4. Pt will improve SLS BL to 5 secs without UE support to reduce risk for falls. (progressing, not)    PLAN     Outpatient physical therapy 1-2  times weekly to include: pt ed, hep, therapeutic exercises, neuromuscular re-education/ balance exercises, manual therapy. . Cont PT for  6 weeks. Pt may be seen by PTA as part of the rehabilitation team. Certification period through April 4 , 2017.     Therapist: Yi Lind, PT  3/2/2017

## 2017-03-03 ENCOUNTER — TELEPHONE (OUTPATIENT)
Dept: UROLOGY | Facility: CLINIC | Age: 67
End: 2017-03-03

## 2017-03-03 DIAGNOSIS — N40.1 BPH WITH URINARY OBSTRUCTION: Primary | ICD-10-CM

## 2017-03-03 DIAGNOSIS — R97.20 ELEVATED PSA: ICD-10-CM

## 2017-03-03 DIAGNOSIS — N13.8 BPH WITH URINARY OBSTRUCTION: Primary | ICD-10-CM

## 2017-03-03 NOTE — TELEPHONE ENCOUNTER
02/22/2017 MRI of the Prostate    Peripheral Zone: PI RADS grade 2  Transitional Zone: PI RADS grade 2    Prostate size 36 ml    Results sent to scanning;   RTC 6 months with PSA.

## 2017-03-14 ENCOUNTER — CLINICAL SUPPORT (OUTPATIENT)
Dept: REHABILITATION | Facility: HOSPITAL | Age: 67
End: 2017-03-14
Attending: INTERNAL MEDICINE
Payer: COMMERCIAL

## 2017-03-14 DIAGNOSIS — R26.9 GAIT ABNORMALITY: Primary | ICD-10-CM

## 2017-03-14 DIAGNOSIS — Z74.09 IMPAIRED FUNCTIONAL MOBILITY, BALANCE, GAIT, AND ENDURANCE: ICD-10-CM

## 2017-03-14 PROCEDURE — 97112 NEUROMUSCULAR REEDUCATION: CPT

## 2017-03-14 PROCEDURE — 97110 THERAPEUTIC EXERCISES: CPT

## 2017-03-16 NOTE — PROGRESS NOTES
PHYSICAL THERAPY DISCHARGE SUMMARY     Name: Raffy Rutherford Jr.  Clinic Number: [id number]    Diagnosis: gait abnormality  MD referral: Nini Rendon MD   Treatment Orders: PT Eval and Treat  Past Medical History:   Diagnosis Date    Adenomatous polyp 2003    Adenomatous polyp     Allergy     Arthritis     Back pain     after trauma beginning in 195    Chronic pain     neck and left shoulder    Cluster headache 2013    Colon polyp     Degenerative disc disease     Depression     Diverticulitis 12/2013    Elevated PSA     Fibromyalgia 2013    GERD (gastroesophageal reflux disease)     Hepatitis 1970's    A    History of prostate biopsy 2002    Hyperlipidemia     Joint pain     Sleep apnea     Special screening for malignant neoplasms, colon 5/5/2014    Thyroid nodule 7/16/2014    Visual disturbance 2012    problems after cataract surgery       Initial visit: 2/21/17  Date of Last visit: 2/14/17  Date of Discharge Note:  3/16/2017  Total Visits Received: 4  Missed Visits: 1    Objective/Treatment     Pt received individual therapeutic exercises to develop strength, endurance, ROM, flexibility and posture for 15 minutes including:  Recumbent bike for ROM, LE strength for 6 min - 3.0 resistance  Manual hamstring stretching - contract relax) 5 min  Standing heel raises - leaning onto mat to do it with assistance  IT band rolling - roll  Opposite knee to chest BL 3 x 30 sec hold     Raffy received neuromuscular re-education for 30 minutes of therapeutic exercise & instruction including:  neuromuscular re-education, postural education, functional gait, balance and activity training utilizing amplitude based exercises.  Extensive patient education and HEP to continue the balance program at home - exercises consisted of:      Multidirectional Repetitive Movements (standing) - 15 reps each side   1. Step and reach - R and L leg forward - modified to use chair  2. Sideways step and reach with both arms  extended to side - to R and L side - chair needed  3. Backwards step and push both arms backwards to the side - chair required for balance     Rock and Reach   4. Forwards/Backwards   - right leg forward, left leg forward with arm swings      Side to Side   5. twist to right and left with support cued to make full turn back to start.   6. Sit to stand with forward weight shift    Education provided re: HEP to perform at discharge, how to get AFO for foot drop and location to get this  Pt verbalized understanding    ASSESSMENT   Status Towards Goals Met:     Short Term GOALS: 3 weeks. Pt agrees with goals set.  1. Pt able to perform HEP correctly with minimal cueing or supervision for therapist met  2. Patient report a reduction in near falls to 2x day. Met  3. Pt will improve SLS BL to 3 secs without UE support to reduce risk for falls. (progressing, not)     Long Term GOALS: 6 weeks. Pt agrees with goals set.  1. Pt will report a decrease in near falls to 1-2 per week at discharge. (progressing, not)  2. Patient will improve TUG score by 2 secs to decreased risk for falls and improve overall community ambulation. (progressing, not)  3. Patient demonstrates improved overall function per FOTO Lumbar Survey to 40 % Limitation or less. (progressing, not)  4. Pt will improve SLS BL to 5 secs without UE support to reduce risk for falls. (progressing, not)      Goals Not achieved and why: patient independent with HEP for balance and LE strength - feel that patient can perform HEP independently at home and does not need skilled PT at this time.     Discharge reason : Patient has maximized benefit from PT at this time    PLAN   This patient is discharged from Physical Therapy Services.

## 2017-03-28 ENCOUNTER — PATIENT MESSAGE (OUTPATIENT)
Dept: INTERNAL MEDICINE | Facility: CLINIC | Age: 67
End: 2017-03-28

## 2017-03-29 NOTE — TELEPHONE ENCOUNTER
Dr Rendon   Thank you again for facilitating and ordering the new CPAP machine for me a few months back. I was able to get the new machine in mid February from [facility] per my [facility] insurance requirements. I have been in compliance with its use as required by [facility].    I would greatly appreciate if you would make a note in my chart ( save me a visit just to document this as apparently this assessment is required too) to verify that I am benefitting from the use of the PAP therapy. I sleep much better, and have been less tired.   As always I appreciate your attention and help   Raffy

## 2017-03-31 ENCOUNTER — OFFICE VISIT (OUTPATIENT)
Dept: ORTHOPEDICS | Facility: CLINIC | Age: 67
End: 2017-03-31
Payer: COMMERCIAL

## 2017-03-31 VITALS — HEIGHT: 68 IN | WEIGHT: 178.81 LBS | BODY MASS INDEX: 27.1 KG/M2

## 2017-03-31 DIAGNOSIS — M70.62 TROCHANTERIC BURSITIS, LEFT HIP: Primary | ICD-10-CM

## 2017-03-31 PROCEDURE — 99999 PR PBB SHADOW E&M-EST. PATIENT-LVL III: CPT | Mod: PBBFAC,,, | Performed by: NURSE PRACTITIONER

## 2017-03-31 PROCEDURE — 20610 DRAIN/INJ JOINT/BURSA W/O US: CPT | Mod: LT,S$GLB,, | Performed by: NURSE PRACTITIONER

## 2017-03-31 PROCEDURE — 99499 UNLISTED E&M SERVICE: CPT | Mod: S$GLB,,, | Performed by: NURSE PRACTITIONER

## 2017-03-31 RX ADMIN — TRIAMCINOLONE ACETONIDE 40 MG: 40 INJECTION, SUSPENSION INTRA-ARTICULAR; INTRAMUSCULAR at 04:03

## 2017-04-03 RX ORDER — TRIAMCINOLONE ACETONIDE 40 MG/ML
40 INJECTION, SUSPENSION INTRA-ARTICULAR; INTRAMUSCULAR
Status: COMPLETED | OUTPATIENT
Start: 2017-03-31 | End: 2017-03-31

## 2017-04-03 NOTE — PROGRESS NOTES
Pt with trochanteric bursitis of the left hip. He returns today for a cortisone injection. His last injection was in July. It helped his pain temporarily, but the pain returned which he attributes to physical therapy that he is doing for problems that he's having with balance. The pain is over the lateral hip on the left and is aching/burning. There is radiation along the lateral thigh. He has not had any pain relief with otc pain relievers.    Left hip exam  - stinchfield  - straight leg raist  - pain with internal/external rotation  + pain over the greater trochanter    Greater Trochanter Bursa Injection  Procedure Note    Pre-operative Diagnosis: left greater trochanteric bursitis    Post-operative Diagnosis: same    Indications: left hip pain    Anesthesia: none    Procedure Details     Verbal consent was obtained for the procedure. The injection site was identified and the skin was prepared with alcohol. The left hip was injected from a lateral approach with 1 ml of Kenalog and 3 ml Lidocaine under sterile technique using a 25 gauge 1 1/2 inch needle. The needle was removed and the area cleansed and dressed.    Complications:  None; patient tolerated the procedure well.    he was advised to rest the leg today, using ice and Tylenol as needed for comfort and swelling. he may have an increase in discomfort tonight followed by steady improvement over the next several days. It may take 1-3 weeks following the injection to get the full benefit of the medication.

## 2017-04-05 ENCOUNTER — OFFICE VISIT (OUTPATIENT)
Dept: SURGERY | Facility: CLINIC | Age: 67
End: 2017-04-05
Payer: COMMERCIAL

## 2017-04-05 VITALS
SYSTOLIC BLOOD PRESSURE: 116 MMHG | HEIGHT: 68 IN | BODY MASS INDEX: 26.37 KG/M2 | TEMPERATURE: 99 F | DIASTOLIC BLOOD PRESSURE: 74 MMHG | HEART RATE: 66 BPM | WEIGHT: 174 LBS

## 2017-04-05 DIAGNOSIS — R10.9 ABDOMINAL PAIN, UNSPECIFIED LOCATION: Primary | ICD-10-CM

## 2017-04-05 DIAGNOSIS — K40.90 RIGHT INGUINAL HERNIA: ICD-10-CM

## 2017-04-05 PROCEDURE — 99203 OFFICE O/P NEW LOW 30 MIN: CPT | Mod: S$GLB,,, | Performed by: SURGERY

## 2017-04-05 PROCEDURE — 99999 PR PBB SHADOW E&M-EST. PATIENT-LVL III: CPT | Mod: PBBFAC,,, | Performed by: SURGERY

## 2017-04-05 NOTE — PROGRESS NOTES
History & Physical    SUBJECTIVE:     History of Present Illness:  Patient is a 66 y.o. male presents with right sided abdominal pain to the right and above the umbilicus.  He states that this pain tracks down to the right groin.  This pain has been present for years but has gotten significantly worse since he began PT and a workout program.  No changes in bowel habits.  No nausea/vomiting.  No fevers/chills.      Chief Complaint   Patient presents with    Hernia       Review of patient's allergies indicates:   Allergen Reactions    Alphagan [brimonidine]      Patient taking MASO-B Selective Inhibitor Selegiline (Emsam)    Coumadin [warfarin]      itch    Oxycodone      hiccups       Current Outpatient Prescriptions   Medication Sig Dispense Refill    acetaZOLAMIDE (DIAMOX) 250 MG tablet       benzonatate (TESSALON) 100 MG capsule Take 200 mg by mouth 3 (three) times daily as needed.  1    clonazePAM (KLONOPIN) 0.5 MG tablet Take 2 tablets (1 mg total) by mouth once daily. 180 tablet 3    cyanocobalamin 1,000 mcg/mL injection   1    fish oil-omega-3 fatty acids 300-1,000 mg capsule Take 2 g by mouth once daily.      hydrocortisone-pramoxine (ANALPRAM-HC) 2.5-1 % Crea 2.5 mg.      ketoconazole (NIZORAL) 2 % cream Apply to affected area 2 times a day for 2 weeks  1    lamotrigine (LAMICTAL) 100 MG tablet Take 1 tablet (100 mg total) by mouth once daily. 90 tablet 3    lidocaine-prilocaine (EMLA) cream       meclizine (ANTIVERT) 25 mg tablet Take 1 tablet (25 mg total) by mouth every 6 (six) hours. Prn vertigo (Patient taking differently: Take 25 mg by mouth every 6 (six) hours as needed. Prn vertigo) 25 tablet 1    pravastatin (PRAVACHOL) 40 MG tablet Take 1 tablet (40 mg total) by mouth once daily. 90 tablet 3    rabeprazole (ACIPHEX) 20 mg tablet Take 1 tablet (20 mg total) by mouth 2 (two) times daily. (Patient taking differently: Take 20 mg by mouth once daily. ) 180 tablet 3    selegiline (EMSAM)  12 mg/24 hr Place 1 patch onto the skin once daily. 90 patch 3    selegiline (EMSAM) 12 mg/24 hr Place 1 patch onto the skin once daily.      senna-docusate 8.6-50 mg (PERICOLACE) 8.6-50 mg per tablet Take 2 tablets by mouth nightly as needed for Constipation.      sucralfate (CARAFATE) 1 gram tablet as needed.       trazodone (DESYREL) 100 MG tablet Take 2 tablets (200 mg total) by mouth every evening. 180 tablet 3    UNABLE TO FIND medication name: cefaly + electrodes, wear 20-30 mins, Dx: migraine 1 Units 0    VITAMIN D2 50,000 unit capsule TAKE 1 CAPSULE BY MOUTH TWICE A WEEK 24 capsule 3    VOLTAREN 1 % Gel        No current facility-administered medications for this visit.        Past Medical History:   Diagnosis Date    Adenomatous polyp 2003    Adenomatous polyp     Allergy     Arthritis     Back pain     after trauma beginning in 195    Chronic pain     neck and left shoulder    Cluster headache 2013    Colon polyp     Degenerative disc disease     Depression     Diverticulitis 12/2013    Elevated PSA     Fibromyalgia 2013    GERD (gastroesophageal reflux disease)     Hepatitis 1970's    A    History of prostate biopsy 2002    Hyperlipidemia     Joint pain     Sleep apnea     Special screening for malignant neoplasms, colon 5/5/2014    Thyroid nodule 7/16/2014    Visual disturbance 2012    problems after cataract surgery     Past Surgical History:   Procedure Laterality Date    BACK SURGERY      CATARACT EXTRACTION W/  INTRAOCULAR LENS IMPLANT Bilateral     CHOLECYSTECTOMY      COSMETIC SURGERY  2/10/2015    Direct mid-forehead brow lift    COSMETIC SURGERY  2/10/2015    Bilateral upper lid blepahroplasty    EYE SURGERY      HEMORRHOID SURGERY      with complication of chronic bleeding for 6 weeks     JOINT REPLACEMENT      KNEE SURGERY      involving arthroscopic surgery to both knees    SINUS SURGERY      SINUS SURGERY      left molar and sinus surgery for  trigeminal neuralgia    SPINAL FUSION  6/22/2015    L3-L5 XLIF    SPINE SURGERY  6/22/15    XLIF/TANA    TOTAL HIP ARTHROPLASTY  4/2012    Pt states he had total hip replacement on his left hip.     Family History   Problem Relation Age of Onset    Cancer Mother      colon; uterine    Nephrolithiasis Mother     Stroke Mother 86    Pancreatitis Brother     Vision loss Brother      macular degeneration    Diabetes Brother     Macular degeneration Brother     Migraines Sister     No Known Problems Father     No Known Problems Maternal Grandmother     No Known Problems Maternal Grandfather     No Known Problems Paternal Grandmother     No Known Problems Paternal Grandfather     No Known Problems Sister     No Known Problems Maternal Aunt     No Known Problems Maternal Uncle     No Known Problems Paternal Aunt     No Known Problems Paternal Uncle     Melanoma Neg Hx     Amblyopia Neg Hx     Blindness Neg Hx     Cataracts Neg Hx     Glaucoma Neg Hx     Hypertension Neg Hx     Retinal detachment Neg Hx     Strabismus Neg Hx     Thyroid disease Neg Hx      Social History   Substance Use Topics    Smoking status: Never Smoker    Smokeless tobacco: Never Used    Alcohol use Yes      Comment: occasion        Review of Systems:  Review of Systems   Constitutional: Negative for activity change, appetite change, chills, diaphoresis, fatigue and fever.   HENT: Negative for congestion, sinus pressure, sneezing, sore throat and tinnitus.    Eyes: Negative for pain, discharge, redness, itching and visual disturbance.   Respiratory: Negative for apnea, cough, choking, chest tightness and shortness of breath.    Cardiovascular: Negative for chest pain, palpitations and leg swelling.   Gastrointestinal: Positive for abdominal pain. Negative for abdominal distention, blood in stool, constipation, diarrhea and nausea.   Musculoskeletal: Negative for arthralgias, back pain and gait problem.  "  Neurological: Negative for dizziness, facial asymmetry, light-headedness and headaches.   Psychiatric/Behavioral: Negative for agitation, behavioral problems and confusion.       OBJECTIVE:     Vital Signs (Most Recent)  Temp: 98.6 °F (37 °C) (04/05/17 1327)  Pulse: 66 (04/05/17 1327)  BP: 116/74 (04/05/17 1327)  5' 8" (1.727 m)  78.9 kg (174 lb)     Physical Exam:  Physical Exam   Constitutional: He is oriented to person, place, and time. He appears well-developed and well-nourished. No distress.   HENT:   Head: Normocephalic and atraumatic.   Right Ear: External ear normal.   Left Ear: External ear normal.   Eyes: EOM are normal. Right eye exhibits no discharge. Left eye exhibits no discharge. No scleral icterus.   Neck: Neck supple. No thyromegaly present.   Cardiovascular: Normal rate, regular rhythm and normal heart sounds.  Exam reveals no gallop and no friction rub.    No murmur heard.  Pulmonary/Chest: Effort normal and breath sounds normal. No respiratory distress. He has no wheezes. He has no rales.   Abdominal: Soft. Bowel sounds are normal. He exhibits no distension and no mass. There is no tenderness. There is no rebound and no guarding. A hernia (right gladis.  Small, reducible.  No hernia noted to right of umbilicus.) is present.   Musculoskeletal: Normal range of motion.   Neurological: He is alert and oriented to person, place, and time.   Skin: Skin is warm and dry. No rash noted. He is not diaphoretic. No erythema. No pallor.   Psychiatric: He has a normal mood and affect. His behavior is normal. Judgment and thought content normal.           ASSESSMENT/PLAN:     Small right inguinal hernia.  Pain to right and above umbilicus.    PLAN:Plan     Will obtain CT to eval abd wall.    Can proceed with right inguinal hernia repair if he desires after scan  Will call after CT to discuss results.     "

## 2017-04-05 NOTE — LETTER
April 5, 2017      Valarie Rutherford, [facility]  [address]  [place] [id number]           [facility]  [address]  [place] [address]  Phone: [phone]          Patient: Raffy Rutherford Jr.   MR Number: [id number]   YOB: 1950   Date of Visit: 4/5/2017       Dear Valarie Rutherford:    Thank you for referring Raffy Rutherford to me for evaluation. Attached you will find relevant portions of my assessment and plan of care.    If you have questions, please do not hesitate to call me. I look forward to following Raffy Rutherford along with you.    Sincerely,    Amadou Lewis Jr., MD    Enclosure  CC:  No Recipients    If you would like to receive this communication electronically, please contact [email or web address] or [phone] to request more information on [employer] Link access.    For providers and/or their staff who would like to refer a patient to [facility], please contact us through our one-stop-shop provider referral line, [facility] , at [phone].    If you feel you have received this communication in error or would no longer like to receive these types of communications, please e-mail [email or web address]

## 2017-04-10 ENCOUNTER — PATIENT MESSAGE (OUTPATIENT)
Dept: ORTHOPEDICS | Facility: CLINIC | Age: 67
End: 2017-04-10

## 2017-04-11 ENCOUNTER — HOSPITAL ENCOUNTER (OUTPATIENT)
Dept: RADIOLOGY | Facility: HOSPITAL | Age: 67
Discharge: HOME | End: 2017-04-11
Attending: SURGERY
Payer: COMMERCIAL

## 2017-04-11 DIAGNOSIS — R10.9 ABDOMINAL PAIN, UNSPECIFIED LOCATION: ICD-10-CM

## 2017-04-11 PROCEDURE — 74176 CT ABD & PELVIS W/O CONTRAST: CPT | Mod: 26,,, | Performed by: RADIOLOGY

## 2017-04-11 PROCEDURE — 25500020 PHARM REV CODE 255: Performed by: SURGERY

## 2017-04-11 PROCEDURE — 74176 CT ABD & PELVIS W/O CONTRAST: CPT | Mod: TC

## 2017-04-11 RX ADMIN — IOHEXOL 15 ML: 350 INJECTION, SOLUTION INTRAVENOUS at 07:04

## 2017-04-11 RX ADMIN — IOHEXOL 15 ML: 350 INJECTION, SOLUTION INTRAVENOUS at 08:04

## 2017-04-17 ENCOUNTER — OFFICE VISIT (OUTPATIENT)
Dept: SURGERY | Facility: CLINIC | Age: 67
End: 2017-04-17
Payer: COMMERCIAL

## 2017-04-17 VITALS
TEMPERATURE: 99 F | WEIGHT: 174 LBS | DIASTOLIC BLOOD PRESSURE: 70 MMHG | HEART RATE: 69 BPM | HEIGHT: 68 IN | SYSTOLIC BLOOD PRESSURE: 125 MMHG | BODY MASS INDEX: 26.37 KG/M2

## 2017-04-17 DIAGNOSIS — G47.30 SLEEP APNEA, UNSPECIFIED TYPE: ICD-10-CM

## 2017-04-17 DIAGNOSIS — M79.7 FIBROMYALGIA: ICD-10-CM

## 2017-04-17 DIAGNOSIS — E78.5 HYPERLIPIDEMIA, UNSPECIFIED HYPERLIPIDEMIA TYPE: ICD-10-CM

## 2017-04-17 DIAGNOSIS — K40.90 RIGHT INGUINAL HERNIA: Primary | ICD-10-CM

## 2017-04-17 PROCEDURE — 99212 OFFICE O/P EST SF 10 MIN: CPT | Mod: S$GLB,,, | Performed by: SURGERY

## 2017-04-17 PROCEDURE — 99999 PR PBB SHADOW E&M-EST. PATIENT-LVL III: CPT | Mod: PBBFAC,,, | Performed by: SURGERY

## 2017-04-17 RX ORDER — LIDOCAINE HYDROCHLORIDE 10 MG/ML
1 INJECTION, SOLUTION EPIDURAL; INFILTRATION; INTRACAUDAL; PERINEURAL ONCE
Status: CANCELLED | OUTPATIENT
Start: 2017-04-17 | End: 2017-04-17

## 2017-04-17 NOTE — PROGRESS NOTES
HPI:  The patient presents for discussion of his CT results.  No other abnormalitiy other than his right inguinal hernia.  No sig changes in symptoms.    PHYSICAL EXAM:  Physical Exam     In NAD  Right inguinal hernia    ASSESSMENT:    Right inguinal hernia     PLAN:    PLan to OR for lap right with eval left in near future  Consent obtained  Will need to see Veda prior to surgery.  If he needs ortho surgery will discuss ability to coordinate.   Call with questions.

## 2017-04-24 ENCOUNTER — HOSPITAL ENCOUNTER (OUTPATIENT)
Dept: RADIOLOGY | Facility: HOSPITAL | Age: 67
Discharge: HOME | End: 2017-04-24
Attending: ORTHOPAEDIC SURGERY
Payer: COMMERCIAL

## 2017-04-24 ENCOUNTER — OFFICE VISIT (OUTPATIENT)
Dept: SPORTS MEDICINE | Facility: CLINIC | Age: 67
End: 2017-04-24
Payer: COMMERCIAL

## 2017-04-24 VITALS
HEIGHT: 68 IN | SYSTOLIC BLOOD PRESSURE: 115 MMHG | DIASTOLIC BLOOD PRESSURE: 72 MMHG | BODY MASS INDEX: 26.37 KG/M2 | HEART RATE: 68 BPM | WEIGHT: 174 LBS

## 2017-04-24 DIAGNOSIS — M25.552 LEFT HIP PAIN: ICD-10-CM

## 2017-04-24 DIAGNOSIS — M25.552 LEFT HIP PAIN: Primary | ICD-10-CM

## 2017-04-24 PROCEDURE — 73502 X-RAY EXAM HIP UNI 2-3 VIEWS: CPT | Mod: TC,PO,LT

## 2017-04-24 PROCEDURE — 73502 X-RAY EXAM HIP UNI 2-3 VIEWS: CPT | Mod: 26,LT,, | Performed by: RADIOLOGY

## 2017-04-24 PROCEDURE — 99999 PR PBB SHADOW E&M-EST. PATIENT-LVL III: CPT | Mod: PBBFAC,,, | Performed by: ORTHOPAEDIC SURGERY

## 2017-04-24 PROCEDURE — 99203 OFFICE O/P NEW LOW 30 MIN: CPT | Mod: S$GLB,,, | Performed by: ORTHOPAEDIC SURGERY

## 2017-04-24 RX ORDER — MELOXICAM 15 MG/1
15 TABLET ORAL DAILY
Qty: 60 TABLET | Refills: 2 | Status: SHIPPED | OUTPATIENT
Start: 2017-04-24 | End: 2017-07-18

## 2017-04-24 NOTE — PROGRESS NOTES
CC: left hip pain    HPI:   Raffy Rutherford Jr. 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 No trauma, no mech sxs/instabilty.    Today the patient rates pain at a 6/10 on visual analog scale.      Affecting ADLs and exercising    walking activity worst    Review of Systems   Constitution: Negative. Negative for chills, fever and night sweats.   HENT: Negative for congestion and headaches.    Eyes: Negative for blurred vision, left vision loss and right vision loss.   Cardiovascular: Negative for chest pain and syncope.   Respiratory: Negative for cough and shortness of breath.    Endocrine: Negative for polydipsia, polyphagia and polyuria.   Hematologic/Lymphatic: Negative for bleeding problem. Does not bruise/bleed easily.   Skin: Negative for dry skin, itching and rash.   Musculoskeletal: Negative for falls and muscle weakness.   Gastrointestinal: Negative for abdominal pain and bowel incontinence.   Genitourinary: Negative for bladder incontinence and nocturia.   Neurological: Negative for disturbances in coordination, loss of balance and seizures.   Psychiatric/Behavioral: Negative for depression. The patient does not have insomnia.    Allergic/Immunologic: Negative for hives and persistent infections.     PAST MEDICAL HISTORY:   Past Medical History:   Diagnosis Date    Adenomatous polyp 2003    Adenomatous polyp     Allergy     Arthritis     Back pain     after trauma beginning in 195    Chronic pain     neck and left shoulder    Cluster headache 2013    Colon polyp     Degenerative disc disease     Depression     Diverticulitis 12/2013    Elevated PSA     Fibromyalgia 2013    GERD (gastroesophageal reflux disease)     Hepatitis 1970's    A    History of prostate biopsy 2002    Hyperlipidemia     Joint pain     Sleep apnea     Special screening for malignant neoplasms, colon 5/5/2014    Thyroid nodule 7/16/2014    Visual  disturbance 2012    problems after cataract surgery     PAST SURGICAL HISTORY:   Past Surgical History:   Procedure Laterality Date    BACK SURGERY      CATARACT EXTRACTION W/  INTRAOCULAR LENS IMPLANT Bilateral     CHOLECYSTECTOMY      COSMETIC SURGERY  2/10/2015    Direct mid-forehead brow lift    COSMETIC SURGERY  2/10/2015    Bilateral upper lid blepahroplasty    EYE SURGERY      HEMORRHOID SURGERY      with complication of chronic bleeding for 6 weeks     JOINT REPLACEMENT      KNEE SURGERY      involving arthroscopic surgery to both knees    SINUS SURGERY      SINUS SURGERY      left molar and sinus surgery for trigeminal neuralgia    SPINAL FUSION  6/22/2015    L3-L5 XLIF    SPINE SURGERY  6/22/15    XLIF/TANA    TOTAL HIP ARTHROPLASTY  4/2012    Pt states he had total hip replacement on his left hip.     FAMILY HISTORY:   Family History   Problem Relation Age of Onset    Cancer Mother      colon; uterine    Nephrolithiasis Mother     Stroke Mother 86    Pancreatitis Brother     Vision loss Brother      macular degeneration    Diabetes Brother     Macular degeneration Brother     Migraines Sister     No Known Problems Father     No Known Problems Maternal Grandmother     No Known Problems Maternal Grandfather     No Known Problems Paternal Grandmother     No Known Problems Paternal Grandfather     No Known Problems Sister     No Known Problems Maternal Aunt     No Known Problems Maternal Uncle     No Known Problems Paternal Aunt     No Known Problems Paternal Uncle     Melanoma Neg Hx     Amblyopia Neg Hx     Blindness Neg Hx     Cataracts Neg Hx     Glaucoma Neg Hx     Hypertension Neg Hx     Retinal detachment Neg Hx     Strabismus Neg Hx     Thyroid disease Neg Hx      SOCIAL HISTORY:   Social History     Social History    Marital status:      Spouse name: N/A    Number of children: N/A    Years of education: N/A     Occupational History     Psychotherpist       Social History Main Topics    Smoking status: Never Smoker    Smokeless tobacco: Never Used    Alcohol use Yes      Comment: occasion    Drug use: No    Sexual activity: Yes     Partners: Female     Other Topics Concern    Not on file     Social History Narrative       MEDICATIONS:   Current Outpatient Prescriptions:     acetaZOLAMIDE (DIAMOX) 250 MG tablet, , Disp: , Rfl:     benzonatate (TESSALON) 100 MG capsule, Take 200 mg by mouth 3 (three) times daily as needed., Disp: , Rfl: 1    clonazePAM (KLONOPIN) 0.5 MG tablet, Take 2 tablets (1 mg total) by mouth once daily., Disp: 180 tablet, Rfl: 3    cyanocobalamin 1,000 mcg/mL injection, , Disp: , Rfl: 1    fish oil-omega-3 fatty acids 300-1,000 mg capsule, Take 2 g by mouth once daily., Disp: , Rfl:     hydrocortisone-pramoxine (ANALPRAM-HC) 2.5-1 % Crea, 2.5 mg., Disp: , Rfl:     ketoconazole (NIZORAL) 2 % cream, Apply to affected area 2 times a day for 2 weeks, Disp: , Rfl: 1    lamotrigine (LAMICTAL) 100 MG tablet, Take 1 tablet (100 mg total) by mouth once daily., Disp: 90 tablet, Rfl: 3    lidocaine-prilocaine (EMLA) cream, , Disp: , Rfl:     meclizine (ANTIVERT) 25 mg tablet, Take 1 tablet (25 mg total) by mouth every 6 (six) hours. Prn vertigo (Patient taking differently: Take 25 mg by mouth every 6 (six) hours as needed. Prn vertigo), Disp: 25 tablet, Rfl: 1    pravastatin (PRAVACHOL) 40 MG tablet, Take 1 tablet (40 mg total) by mouth once daily., Disp: 90 tablet, Rfl: 3    rabeprazole (ACIPHEX) 20 mg tablet, Take 1 tablet (20 mg total) by mouth 2 (two) times daily. (Patient taking differently: Take 20 mg by mouth once daily. ), Disp: 180 tablet, Rfl: 3    selegiline (EMSAM) 12 mg/24 hr, Place 1 patch onto the skin once daily., Disp: 90 patch, Rfl: 3    selegiline (EMSAM) 12 mg/24 hr, Place 1 patch onto the skin once daily., Disp: , Rfl:     senna-docusate 8.6-50 mg (PERICOLACE) 8.6-50 mg per tablet, Take 2  "tablets by mouth nightly as needed for Constipation., Disp: , Rfl:     sucralfate (CARAFATE) 1 gram tablet, as needed. , Disp: , Rfl:     trazodone (DESYREL) 100 MG tablet, Take 2 tablets (200 mg total) by mouth every evening., Disp: 180 tablet, Rfl: 3    UNABLE TO FIND, medication name: cefaly + electrodes, wear 20-30 mins, Dx: migraine, Disp: 1 Units, Rfl: 0    VITAMIN D2 50,000 unit capsule, TAKE 1 CAPSULE BY MOUTH TWICE A WEEK, Disp: 24 capsule, Rfl: 3    VOLTAREN 1 % Gel, , Disp: , Rfl:   ALLERGIES:   Review of patient's allergies indicates:   Allergen Reactions    Alphagan [brimonidine]      Patient taking MASO-B Selective Inhibitor Selegiline (Emsam)    Coumadin [warfarin]      itch    Oxycodone      hiccups       VITAL SIGNS: /72  Pulse 68  Ht 5' 8" (1.727 m)  Wt 78.9 kg (174 lb)  BMI 26.46 kg/m2       PHYSICAL EXAM /  HIP  PHYSICAL EXAMINATION  General:  The patient is alert and oriented x 3.  Mood is pleasant.  Observation of ears, eyes and nose reveal no gross abnormalities.  HEENT: NCAT, sclera nonicteric  Lungs: Respirations are equal and unlabored..    left HIP EXAMINATION     OBSERVATION / INSPECTION  Gait:   Nonantalgic   Alignment:  Neutral   Scars:   None   Muscle atrophy: None   Effusion:  None   Warmth:  None   Discoloration:   None   Leg lengths:   Equal   Pelvis:   Level     TENDERNESS / CREPITUS (T/C):      T / C  Trochanteric bursa   - / -  Piriformis    + / -  SI joint    - / -  Psoas tendon   - / -  Rectus insertion  - / -  Adductor insertion  - / -  Pubic symphysis  - / -    ROM: (* = pain)    Flexion:    120 degrees  External rotation: 40 degrees  Internal rotation with axial load: 30 degrees  Internal rotation without axial load: 40 degrees  Abduction:  45 degrees  Adduction:   20 degrees    SPECIAL TESTS:  Pain w/ forced internal rotation (FADIR): -   Pain w/ forced external rotation (ABDIEL): Absent   Circumduction test:    -  [facility] test:    Negative   Log " roll:      Negative   Snapping hip (internal):   Negative   Sit-up pain:     Negative   Resisted sit-up pain:    Negative   Resisted sit-up with adductor contraction pain:  Negative   Step-down test:    +  Trendelenburg test:    Negative  Bridge test     +     EXTREMITY NEURO-VASCULAR EXAMINATION:   Sensation:  Grossly intact to light touch all dermatomal regions.   Motor Function:  Fully intact motor function at hip, knee, foot and ankle    DTRs;  quadriceps and  achilles 2+.  No clonus and downgoing Babinski.    Vascular status:  DP and PT pulses 2+, brisk capillary refill, symmetric.    Skin:  intact, compartments soft.    OTHER FINDINGS:      XRAYS:  Intact, stable SHARAD      ASSESSMENT:    1. left hip abd/core weakness  2. Left hip piriformis tightness    PLAN:  1. PT for left hip abd/core strengthing  2. NSAIDS prn  3. All questions were answered, pt will contact us for questions or concerns in the interim.  4. Dr. Marie Piriformis injection

## 2017-04-24 NOTE — LETTER
April 24, 2017      Lisandra Trinh NP  [address]  [facility] [address]           [facility]  [address]  [facility] [address]  Phone: [phone]          Patient: Raffy Rutherford Jr.   MR Number: [id number]   YOB: 1950   Date of Visit: 4/24/2017       Dear Lisandra Trinh:    Thank you for referring Raffy Rutherford to me for evaluation. Attached you will find relevant portions of my assessment and plan of care.    If you have questions, please do not hesitate to call me. I look forward to following Raffy Rutherford along with you.    Sincerely,    Yolanda Mccollum MD    Enclosure  CC:  No Recipients    If you would like to receive this communication electronically, please contact [email or web address] or [phone] to request more information on [employer] Link access.    For providers and/or their staff who would like to refer a patient to [facility], please contact us through our one-stop-shop provider referral line, [facility] , at [phone].    If you feel you have received this communication in error or would no longer like to receive these types of communications, please e-mail [email or web address]

## 2017-04-24 NOTE — MR AVS SNAPSHOT
[facility]  [address]  [facility] [address]  Phone: [phone]                  Raffy Rutherford Jr.   2017 9:00 AM   Appointment    Description:  Male : 1950   Provider:  Yolanda Mccollum MD   Department:  [facility]                To Do List           Future Appointments        Provider Department Dept Phone    2017 9:00 AM Yolanda Mccollum MD [facility] [phone]    2017 2:00 PM Amadou Hardin MD [facility] - Gastroenterology [phone]      Goals (5 Years of Data)     None      [facility]     [facility] -  Assistance  Unless otherwise directed by your provider, please contact [facility], our nurse care line that is available for  assistance.     Registered nurses in the [facility] provide: appointment scheduling, clinical advisement, health education, and other advisory services.  Call: [phone] (toll free)               Medications           Message regarding Medications     Verify the changes and/or additions to your medication regime listed below are the same as discussed with your clinician today.  If any of these changes or additions are incorrect, please notify your healthcare provider.             Verify that the below list of medications is an accurate representation of the medications you are currently taking.  If none reported, the list may be blank. If incorrect, please contact your healthcare provider. Carry this list with you in case of emergency.           Current Medications     acetaZOLAMIDE (DIAMOX) 250 MG tablet     benzonatate (TESSALON) 100 MG capsule Take 200 mg by mouth 3 (three) times daily as needed.    clonazePAM (KLONOPIN) 0.5 MG tablet Take 2 tablets (1 mg total) by mouth once daily.    cyanocobalamin 1,000 mcg/mL injection     fish oil-omega-3 fatty acids 300-1,000 mg capsule Take 2 g by mouth once daily.    hydrocortisone-pramoxine (ANALPRAM-HC)  2.5-1 % Crea 2.5 mg.    ketoconazole (NIZORAL) 2 % cream Apply to affected area 2 times a day for 2 weeks    lamotrigine (LAMICTAL) 100 MG tablet Take 1 tablet (100 mg total) by mouth once daily.    lidocaine-prilocaine (EMLA) cream     meclizine (ANTIVERT) 25 mg tablet Take 1 tablet (25 mg total) by mouth every 6 (six) hours. Prn vertigo    pravastatin (PRAVACHOL) 40 MG tablet Take 1 tablet (40 mg total) by mouth once daily.    rabeprazole (ACIPHEX) 20 mg tablet Take 1 tablet (20 mg total) by mouth 2 (two) times daily.    selegiline (EMSAM) 12 mg/24 hr Place 1 patch onto the skin once daily.    selegiline (EMSAM) 12 mg/24 hr Place 1 patch onto the skin once daily.    senna-docusate 8.6-50 mg (PERICOLACE) 8.6-50 mg per tablet Take 2 tablets by mouth nightly as needed for Constipation.    sucralfate (CARAFATE) 1 gram tablet as needed.     trazodone (DESYREL) 100 MG tablet Take 2 tablets (200 mg total) by mouth every evening.    UNABLE TO FIND medication name: cefaly + electrodes, wear 20-30 mins, Dx: migraine    VITAMIN D2 50,000 unit capsule TAKE 1 CAPSULE BY MOUTH TWICE A WEEK    VOLTAREN 1 % Gel            Clinical Reference Information           Allergies as of 4/24/2017     Alphagan [Brimonidine]    Coumadin [Warfarin]    Oxycodone      Immunizations Administered on Date of Encounter - 4/24/2017     None      Language Assistance Services     ATTENTION: Language assistance services are available, free of charge. Please call [phone].      ATENCIÓN: Si misbah acevedo, tiene a sandoval disposición servicios gratuitos de asistencia lingüística. Llamisha al [phone].     YOU Ý: N?u b?n nói Ti?ng Vi?t, có các d?ch v? h? tr? ngôn ng? mi?n phí dành cho b?n. G?i s? [phone].         [facility] complies with applicable Federal civil rights laws and does not discriminate on the basis of race, color, national origin, age, disability, or sex.

## 2017-04-25 ENCOUNTER — TELEPHONE (OUTPATIENT)
Dept: PAIN MEDICINE | Facility: CLINIC | Age: 67
End: 2017-04-25

## 2017-04-25 ENCOUNTER — TELEPHONE (OUTPATIENT)
Dept: SPORTS MEDICINE | Facility: CLINIC | Age: 67
End: 2017-04-25

## 2017-04-25 DIAGNOSIS — M25.552 LEFT HIP PAIN: Primary | ICD-10-CM

## 2017-04-25 NOTE — TELEPHONE ENCOUNTER
Left voice message asking for a return call to schedule for a Left piriformis injection under ultra sound in clinic with Dr. Marie,  Referred by Dr. Mccollum.

## 2017-04-26 ENCOUNTER — TELEPHONE (OUTPATIENT)
Dept: SPORTS MEDICINE | Facility: CLINIC | Age: 67
End: 2017-04-26

## 2017-04-26 NOTE — TELEPHONE ENCOUNTER
----- Message from Diana Silva LPN sent at 4/26/2017 11:41 AM CDT -----  Patient has been scheduled for a Left piriformis injection on 5-2-17 with Dr. Marie.      Thank You,  Diana

## 2017-05-05 ENCOUNTER — TELEPHONE (OUTPATIENT)
Dept: SPORTS MEDICINE | Facility: CLINIC | Age: 67
End: 2017-05-05

## 2017-05-05 NOTE — TELEPHONE ENCOUNTER
I called the patient to get him scheduled for left hip piriformis injection with Dr. Gan. I left him a voicemail asking him to return the call

## 2017-05-16 ENCOUNTER — CLINICAL SUPPORT (OUTPATIENT)
Dept: REHABILITATION | Facility: HOSPITAL | Age: 67
End: 2017-05-16
Attending: ORTHOPAEDIC SURGERY
Payer: COMMERCIAL

## 2017-05-16 DIAGNOSIS — M25.552 LEFT HIP PAIN: ICD-10-CM

## 2017-05-16 PROCEDURE — 97162 PT EVAL MOD COMPLEX 30 MIN: CPT

## 2017-05-16 PROCEDURE — 97110 THERAPEUTIC EXERCISES: CPT

## 2017-05-24 PROBLEM — M25.552 LEFT HIP PAIN: Status: ACTIVE | Noted: 2017-05-24

## 2017-05-24 NOTE — PROGRESS NOTES
Physical Therapy Evaluation    Name: Raffy Rutherford Jr.  Clinic Number: [id number]      Medical Diagnosis:   Encounter Diagnosis   Name Primary?    Left hip pain      PT Diagnosis: Left hip pain  Physician: Yolanda Mccollum MD  Treatment Orders: PT Eval and Treat    Past Medical History:   Diagnosis Date    Adenomatous polyp 2003    Adenomatous polyp     Allergy     Arthritis     Back pain     after trauma beginning in 195    Chronic pain     neck and left shoulder    Cluster headache 2013    Colon polyp     Degenerative disc disease     Depression     Diverticulitis 12/2013    Elevated PSA     Fibromyalgia 2013    GERD (gastroesophageal reflux disease)     Hepatitis 1970's    A    History of prostate biopsy 2002    Hyperlipidemia     Joint pain     Sleep apnea     Special screening for malignant neoplasms, colon 5/5/2014    Thyroid nodule 7/16/2014    Visual disturbance 2012    problems after cataract surgery     Current Outpatient Prescriptions   Medication Sig    acetaZOLAMIDE (DIAMOX) 250 MG tablet     benzonatate (TESSALON) 100 MG capsule Take 200 mg by mouth 3 (three) times daily as needed.    clonazePAM (KLONOPIN) 0.5 MG tablet Take 2 tablets (1 mg total) by mouth once daily.    cyanocobalamin 1,000 mcg/mL injection     fish oil-omega-3 fatty acids 300-1,000 mg capsule Take 2 g by mouth once daily.    hydrocortisone-pramoxine (ANALPRAM-HC) 2.5-1 % Crea 2.5 mg.    ketoconazole (NIZORAL) 2 % cream Apply to affected area 2 times a day for 2 weeks    lamotrigine (LAMICTAL) 100 MG tablet Take 1 tablet (100 mg total) by mouth once daily.    lidocaine-prilocaine (EMLA) cream     meclizine (ANTIVERT) 25 mg tablet Take 1 tablet (25 mg total) by mouth every 6 (six) hours. Prn vertigo (Patient taking differently: Take 25 mg by mouth every 6 (six) hours as needed. Prn vertigo)    meloxicam (MOBIC) 15 MG tablet Take 1 tablet (15 mg total) by mouth once daily. Take a Prilosec 20 mg tablet (over  the counter) once a day every day while taking Mobic    pravastatin (PRAVACHOL) 40 MG tablet Take 1 tablet (40 mg total) by mouth once daily.    rabeprazole (ACIPHEX) 20 mg tablet Take 1 tablet (20 mg total) by mouth 2 (two) times daily. (Patient taking differently: Take 20 mg by mouth once daily. )    selegiline (EMSAM) 12 mg/24 hr Place 1 patch onto the skin once daily.    selegiline (EMSAM) 12 mg/24 hr Place 1 patch onto the skin once daily.    senna-docusate 8.6-50 mg (PERICOLACE) 8.6-50 mg per tablet Take 2 tablets by mouth nightly as needed for Constipation.    sucralfate (CARAFATE) 1 gram tablet as needed.     trazodone (DESYREL) 100 MG tablet Take 2 tablets (200 mg total) by mouth every evening.    UNABLE TO FIND medication name: cefaly + electrodes, wear 20-30 mins, Dx: migraine    VITAMIN D2 50,000 unit capsule TAKE 1 CAPSULE BY MOUTH TWICE A WEEK    VOLTAREN 1 % Gel      No current facility-administered medications for this visit.      Review of patient's allergies indicates:   Allergen Reactions    Alphagan [brimonidine]      Patient taking MASO-B Selective Inhibitor Selegiline (Emsam)    Coumadin [warfarin]      itch    Oxycodone      hiccups       Precautions: Fall Risk    Evaluation Date: 5/16/2017  Visit # authorized: 1/20  Authorization period: 12/31/2017    Subjective   Onset/EHASN: gradual    Primary concern/ Chief complaints:    Raffy is a 67 y.o. male that presents to [facility] secondary to left hip pain.  Pt states his pain has been off and on for a long time but recently got substantially worse and nothing he has tried seems to help.  He was doing PT earlier this year for his balance and gait which helped a little with those things but did not seem to make a difference for his hip.  He does have history of lumbar fusion and left hip replacement. Previous treatment included physical therapy for low back pain after fusion. Pt uses medication as needed to control  "symptoms. Pt has a decreased ability to perform ADLs such as sleeping, prolonged standing/walking, sitting for too long. 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  Pt reports numbness and tingling but thinks most of that is due to having a neurological disorder as well that doctors are trying to pinpoint. No cultural, environmental, or spiritual barriers identified to treatment or learning.     Pain Scale: Raffy rates pain on a scale of 0-10 to be 7 at worst; 5 currently; 3 at best .    Patient Goals: Decreased pain and difficulty with ADLs, music and work activities    Objective     Observation: Patient ambulates with mildly antalgic gait pattern.    Posture: Decreased lordosis, posterior pelvic tilt, increased thoracic kyphosis and anterior scapular positioning.    Hip Range of Motion:   Right Passive Left Passive   Flexion WNL 90 (+)   Abduction WNL WNL   Extension WNL 0    Ext. Rotation WNL WNL   Int. Rotation 30 15     Lower Extremity Strength  Right LE  Left LE    Quadriceps: 4/5 Quadriceps: 4/5   Hamstrings: 3+/5 Hamstrings: 3+/5   Iliopsoas: 3+/5 Iliopsoas: 3+/5   Hip extension:  3/5 Hip extension: 3/5   PGM: 3/5 PGM: 3/5   Dorsiflexion 3+/5 Dorsiflexion 3+/5   Plantarflexion 2/5 Plantarflexion 2/5     Functional Tests:  Bridge Test: Unable to perform full range.  DL Squat Test: Quad dominance, decreased glute control, unable to perform full range.  SLS EO: Poor <3s bilaterally.  Step Down:  Bilateral hip collapse, valgus, trunk lean.    Special Tests:   DARCY: (-)  ABDIEL:  (-)  FADIR: (-)  SIJ: Left upslip noted with apparent ~1/2" leg length discrepancy; corrected with long-axis distraction followed by pubic shotgun.    Flexibility:    Ely's test: R = Mildly restsricted ; L = Mildly restricted    Hamstrings: R = Moderately restricted ; L = Moderately restricted    Piriformis test: R = Mildly " restricted ; L = Moderately restricted (reproducted symptoms)    Joint Mobility: Grossly hypomobile left hip mobility.    Palpation: Point tenderness surrounding SIJ L>R; L distal piriformis.    Edema: N/A    Functional Limitations Reports  Category: mobility  Tool: FOTO Hip  Score: 42%    PT Evaluation Completed? Yes  Discussed Plan of Care with patient: Yes    TREATMENT:  Raffy received therapeutic exercises to develop strength and endurance, flexibility for 25 minutes including:  SIJ long axis distraction for upslip correction followed by pubic shotgun  TrA Sets 5sh x 20  Supine Figure 4 Stretch 1'x2B  Supine Crossover Piriformis Stretch 1'x2B  GTB Clam 20x2     Pt. Received cold pack x 10 min. To left hip following treatment.    Instructed pt. Regarding: Proper technique with all exercises. Pt demo good understanding of the education provided. Raffy demonstrated good return demonstration of activities.  Pt/family was provided educational information, including: role of PT, goals for PT, scheduling - pt verbalized understanding. Discussed insurance limitations with pt.     Pt has no cultural, educational or language barriers to learning provided.    Assessment     This is a 67 y.o. male referred to outpatient physical therapy and presents with a medical diagnosis of left hip pain.  Pt's c/c is difficulty with ADLs, community and work activities.  Pt presents with signs and symptoms consistent with sacroiliac dysfunction and poor hip mobility with significant LE weakness and core stability. Pt will benefit from physcial therapy services, specifically core stabilization, gross lower extremity strengthening, normalized joint mobility and flexibility, progressing to functional mobility and gait training as tolerated, in order to maximize pain free and/or functional use of left hip. The following goals were discussed with the patient and patient is in agreement with them as to be addressed in the treatment plan. Pt  was given a HEP consisting of exercises listed above. Pt verbally understood the instructions as they were given and demonstrated proper form and technique during therapy. Pt was advise to perform these exercises free of pain, and to stop performing them if pain occurs.     History  Co-morbidities and personal factors that may impact the plan of care Examination  Body Structures and Functions, activity limitations and participation restrictions that may impact the plan of care Clinical Presentation   Decision Making/ Complexity Score   Co-morbidities:     Neurological disorder    Lumbar hx    Personal Factors:   Chronicity Body Regions:  Lumbar spine, hip    Body Systems:  Strength, range of motion, joint mobility    Activity limitations: ambulation, standing      Participation Restrictions: playing music     Unpredictable/Stable   Moderate     Medical necessity is demonstrated by the following IMPAIRMENTS/PROMBLEM LIST:   1)Increase in pain level limiting function   2)Decreased strength   3)Decreased functional mobility   4)Decreased ambulation   5)Lack of HEP    Anticipated barriers to physical therapy: Chronicity of symptoms as well as unknown etiology of gait disturbance from neurological disorder    Pt's spiritual, cultural and educational needs considered and pt agreeable to plan of care and goals as stated below:     GOALS: Short Term Goals:  6-8 weeks  1.  Report decreased left hip pain  < / =  4/10  to increase tolerance for ambulation and ADLs.  2.  Increase ROM to WNL in all directions where limited in order to perform ADLs without difficulty.  3.  Increase gross core and lower extremity strength in order to perform 20 bridges correctly in order to demonstrate improved core and hip strength for normalized ambulation.  4.  Pt to tolerate HEP to improve ROM and independence with ADL's.  5.  Patient will report improved ease with ambulation and standing as desired for playing music.  6.  Patient will  "demonstrate normalized 6" step down in order to show functional improvement in strength and decreased pain.    Plan     Pt will be treated by physical therapy 1-2 times a week for Pt Education, HEP, therapeutic exercises, neuromuscular re-education, joint mobilizations, modalities prn to achieve established goals. Raffy may at times be seen by a PTA as part of the Rehab Team.     Cont PT for 6-8 weeks.     Becca Russell, PT, DPT    I certify the need for these services furnished under this plan of treatment and while under my care.______________________________ Physician/Referring Practitioner  Date of Signature          "

## 2017-05-26 ENCOUNTER — TELEPHONE (OUTPATIENT)
Dept: OPTOMETRY | Facility: CLINIC | Age: 67
End: 2017-05-26

## 2017-05-26 NOTE — TELEPHONE ENCOUNTER
----- Message from Miguel Angel Hurt sent at 5/26/2017  2:18 PM CDT -----  Contact: Raffy  Mr. Rutherford would like you to contact him. The medication Dr. Ocampo prescribed for headaches isn't working. He can be reached at [phone]    05/26/17  Madhuri called after speaking with Dr. Ocampo,OD and she told me to schedule appt of pt on Monday and it was done 05/29/17 at 1:15p per Dr. Ocampo for c/o headaches w/the use of Re stasis eye drops. stj

## 2017-05-29 ENCOUNTER — CLINICAL SUPPORT (OUTPATIENT)
Dept: REHABILITATION | Facility: HOSPITAL | Age: 67
End: 2017-05-29
Attending: ORTHOPAEDIC SURGERY
Payer: COMMERCIAL

## 2017-05-29 ENCOUNTER — OFFICE VISIT (OUTPATIENT)
Dept: OPTOMETRY | Facility: CLINIC | Age: 67
End: 2017-05-29
Payer: COMMERCIAL

## 2017-05-29 DIAGNOSIS — H04.123 DRY EYE SYNDROME, BILATERAL: ICD-10-CM

## 2017-05-29 DIAGNOSIS — Z96.1 PSEUDOPHAKIA OF BOTH EYES: ICD-10-CM

## 2017-05-29 DIAGNOSIS — H52.203 HYPEROPIA WITH ASTIGMATISM AND PRESBYOPIA, BILATERAL: ICD-10-CM

## 2017-05-29 DIAGNOSIS — H53.8 BLURRED VISION, LEFT EYE: ICD-10-CM

## 2017-05-29 DIAGNOSIS — H52.4 HYPEROPIA WITH ASTIGMATISM AND PRESBYOPIA, BILATERAL: ICD-10-CM

## 2017-05-29 DIAGNOSIS — M25.552 LEFT HIP PAIN: ICD-10-CM

## 2017-05-29 DIAGNOSIS — H18.529 MAP-DOT-FINGERPRINT CORNEAL DYSTROPHY: Primary | ICD-10-CM

## 2017-05-29 DIAGNOSIS — H52.03 HYPEROPIA WITH ASTIGMATISM AND PRESBYOPIA, BILATERAL: ICD-10-CM

## 2017-05-29 PROCEDURE — 97110 THERAPEUTIC EXERCISES: CPT

## 2017-05-29 PROCEDURE — 99999 PR PBB SHADOW E&M-EST. PATIENT-LVL III: CPT | Mod: PBBFAC,,, | Performed by: OPTOMETRIST

## 2017-05-29 PROCEDURE — 92015 DETERMINE REFRACTIVE STATE: CPT | Mod: S$GLB,,, | Performed by: OPTOMETRIST

## 2017-05-29 PROCEDURE — 97140 MANUAL THERAPY 1/> REGIONS: CPT

## 2017-05-29 PROCEDURE — 92012 INTRM OPH EXAM EST PATIENT: CPT | Mod: S$GLB,,, | Performed by: OPTOMETRIST

## 2017-05-29 NOTE — PROGRESS NOTES
Physical Therapy Visit Note    Name: Raffy Rutherford Jr.  Clinic Number: [id number]      Medical Diagnosis:   Encounter Diagnosis   Name Primary?    Left hip pain      PT Diagnosis: Left hip pain  Physician: Yolanda Mccollum MD  Treatment Orders: PT Eval and Treat    Past Medical History:   Diagnosis Date    Adenomatous polyp 2003    Adenomatous polyp     Allergy     Arthritis     Back pain     after trauma beginning in 195    Chronic pain     neck and left shoulder    Cluster headache 2013    Colon polyp     Degenerative disc disease     Depression     Diverticulitis 12/2013    Elevated PSA     Fibromyalgia 2013    GERD (gastroesophageal reflux disease)     Hepatitis 1970's    A    History of prostate biopsy 2002    Hyperlipidemia     Joint pain     Sleep apnea     Special screening for malignant neoplasms, colon 5/5/2014    Thyroid nodule 7/16/2014    Visual disturbance 2012    problems after cataract surgery     Current Outpatient Prescriptions   Medication Sig    acetaZOLAMIDE (DIAMOX) 250 MG tablet     benzonatate (TESSALON) 100 MG capsule Take 200 mg by mouth 3 (three) times daily as needed.    clonazePAM (KLONOPIN) 0.5 MG tablet Take 2 tablets (1 mg total) by mouth once daily.    cyanocobalamin 1,000 mcg/mL injection     fish oil-omega-3 fatty acids 300-1,000 mg capsule Take 2 g by mouth once daily.    hydrocortisone-pramoxine (ANALPRAM-HC) 2.5-1 % Crea 2.5 mg.    ketoconazole (NIZORAL) 2 % cream Apply to affected area 2 times a day for 2 weeks    lamotrigine (LAMICTAL) 100 MG tablet Take 1 tablet (100 mg total) by mouth once daily.    lidocaine-prilocaine (EMLA) cream     lifitegrast 5 % Dpet Apply 1 drop to eye 2 (two) times daily.    meclizine (ANTIVERT) 25 mg tablet Take 1 tablet (25 mg total) by mouth every 6 (six) hours. Prn vertigo (Patient taking differently: Take 25 mg by mouth every 6 (six) hours as needed. Prn vertigo)    meloxicam (MOBIC) 15 MG tablet Take 1 tablet (15  mg total) by mouth once daily. Take a Prilosec 20 mg tablet (over the counter) once a day every day while taking Mobic    pravastatin (PRAVACHOL) 40 MG tablet Take 1 tablet (40 mg total) by mouth once daily.    rabeprazole (ACIPHEX) 20 mg tablet Take 1 tablet (20 mg total) by mouth 2 (two) times daily. (Patient taking differently: Take 20 mg by mouth once daily. )    selegiline (EMSAM) 12 mg/24 hr Place 1 patch onto the skin once daily.    selegiline (EMSAM) 12 mg/24 hr Place 1 patch onto the skin once daily.    senna-docusate 8.6-50 mg (PERICOLACE) 8.6-50 mg per tablet Take 2 tablets by mouth nightly as needed for Constipation.    sucralfate (CARAFATE) 1 gram tablet as needed.     trazodone (DESYREL) 100 MG tablet Take 2 tablets (200 mg total) by mouth every evening.    UNABLE TO FIND medication name: cefaly + electrodes, wear 20-30 mins, Dx: migraine    VITAMIN D2 50,000 unit capsule TAKE 1 CAPSULE BY MOUTH TWICE A WEEK    VOLTAREN 1 % Gel      No current facility-administered medications for this visit.      Review of patient's allergies indicates:   Allergen Reactions    Alphagan [brimonidine]      Patient taking MASO-B Selective Inhibitor Selegiline (Emsam)    Coumadin [warfarin]      itch    Oxycodone      hiccups       Precautions: Fall Risk    Today's Date:  5/29/2017  Evaluation Date: 5/16/2017  Visit # authorized: 2/20  Authorization period: 12/31/2017  Time In:  1500  TIme Out:  1600    Subjective     Patient states after his initial evaluation his hernia was sore for a long time so he was nervous to try any of the exercises again.  He went to [place] and did a lot of hiking in the mountains and his hips felt much better there.  He began having a little pain again after being back for 2-3 days and today his left hip is 3/10 and right hip is 2/10 so he is worried whatever we do today is going to exacerbate symptoms again.    Patient Goals: Decreased pain and difficulty with ADLs, music and work  "activities    Objective     Observation: Patient ambulates with mildly antalgic gait pattern.    Posture: Decreased lordosis, posterior pelvic tilt, increased thoracic kyphosis and anterior scapular positioning.    Hip Range of Motion:   Right Passive Left Passive   Flexion WNL 90 (+)   Abduction WNL WNL   Extension WNL 0    Ext. Rotation WNL WNL   Int. Rotation 30 15     Lower Extremity Strength  Right LE  Left LE    Quadriceps: 4/5 Quadriceps: 4/5   Hamstrings: 3+/5 Hamstrings: 3+/5   Iliopsoas: 3+/5 Iliopsoas: 3+/5   Hip extension:  3/5 Hip extension: 3/5   PGM: 3/5 PGM: 3/5   Dorsiflexion 3+/5 Dorsiflexion 3+/5   Plantarflexion 2/5 Plantarflexion 2/5     Functional Tests:  Bridge Test: Unable to perform full range.  DL Squat Test: Quad dominance, decreased glute control, unable to perform full range.  SLS EO: Poor <3s bilaterally.  Step Down:  Bilateral hip collapse, valgus, trunk lean.    Special Tests:   DARCY: (-)  ABDIEL:  (-)  FADIR: (-)  SIJ: Left upslip noted with apparent ~1/2" leg length discrepancy; corrected with long-axis distraction followed by pubic shotgun.    Flexibility:    Ely's test: R = Mildly restsricted ; L = Mildly restricted    Hamstrings: R = Moderately restricted ; L = Moderately restricted    Piriformis test: R = Mildly restricted ; L = Moderately restricted (reproducted symptoms)    Joint Mobility: Grossly hypomobile left hip mobility.    Palpation: Point tenderness surrounding SIJ L>R; L distal piriformis.    Edema: N/A    Functional Limitations Reports  Category: mobility  Tool: FOTO Hip  Score: 42%    TREATMENT:  Raffy received therapeutic exercises to develop strength and endurance, flexibility for 15 minutes including:  Bike L1/10'  SIJ long axis distraction for upslip correction followed by pubic shotgun    Manual Therapy for joint mobilization, soft tissue mobilization and decreased pain for 35 minutes including:  Lateral, inferior mobs with belt bilaterally  Prone Anterior hip " mobs to tolerance    Not today:  TrA Sets 5sh x 20  Supine Figure 4 Stretch 1'x2B  Supine Crossover Piriformis Stretch 1'x2B  GTB Clam 20x2     Pt. Received cold pack x 10 min. To bilateral hips following treatment.    Instructed pt. Regarding: Proper technique with all exercises. Pt demo good understanding of the education provided. Raffy demonstrated good return demonstration of activities.  Pt/family was provided educational information, including: role of PT, goals for PT, scheduling - pt verbalized understanding. Discussed insurance limitations with pt.     Pt has no cultural, educational or language barriers to learning provided.    Assessment     Today's Assessment:  Patient stated he felt really good after today's treatment.  Discussed that it may be difficult for us to progress core and hip stab as needed with hernia being so sensitive to activity.  Discussed that if his hips stay under control, we may want to postpone his hip treatment until after hernia repair.  He thinks he may go ahead and have the repair and will call the doctor to see how soon he can get in.    This is a 67 y.o. male referred to outpatient physical therapy and presents with a medical diagnosis of left hip pain.  Pt's c/c is difficulty with ADLs, community and work activities.  Pt presents with signs and symptoms consistent with sacroiliac dysfunction and poor hip mobility with significant LE weakness and core stability. Pt will benefit from physcial therapy services, specifically core stabilization, gross lower extremity strengthening, normalized joint mobility and flexibility, progressing to functional mobility and gait training as tolerated, in order to maximize pain free and/or functional use of left hip.     Medical necessity is demonstrated by the following IMPAIRMENTS/PROMBLEM LIST:   1)Increase in pain level limiting function   2)Decreased strength   3)Decreased functional mobility   4)Decreased ambulation   5)Lack of  "HEP    Anticipated barriers to physical therapy: Chronicity of symptoms as well as unknown etiology of gait disturbance from neurological disorder    Pt's spiritual, cultural and educational needs considered and pt agreeable to plan of care and goals as stated below:     GOALS: Short Term Goals:  6-8 weeks  1.  Report decreased left hip pain  < / =  4/10  to increase tolerance for ambulation and ADLs.  2.  Increase ROM to WNL in all directions where limited in order to perform ADLs without difficulty.  3.  Increase gross core and lower extremity strength in order to perform 20 bridges correctly in order to demonstrate improved core and hip strength for normalized ambulation.  4.  Pt to tolerate HEP to improve ROM and independence with ADL's.  5.  Patient will report improved ease with ambulation and standing as desired for playing music.  6.  Patient will demonstrate normalized 6" step down in order to show functional improvement in strength and decreased pain.    Plan     Pt will be treated by physical therapy 1-2 times a week for Pt Education, HEP, therapeutic exercises, neuromuscular re-education, joint mobilizations, modalities prn to achieve established goals. Raffy may at times be seen by a PTA as part of the Rehab Team.     Cont PT for 6-8 weeks.     Becca Russell, PT, DPT    I certify the need for these services furnished under this plan of treatment and while under my care.______________________________ Physician/Referring Practitioner  Date of Signature        "

## 2017-05-29 NOTE — LETTER
May 29, 2017        [address] [facility] [address]           [facility]  [address]  [facility] [address]  Phone: [phone]  Fax: [phone]          Patient: Raffy Rutherford Jr.   MR Number: [id number]   YOB: 1950   Date of Visit: 5/29/2017       Dear :    Thank you for referring Raffy Rutherford to me for evaluation. Attached you will find relevant portions of my assessment and plan of care.    If you have questions, please do not hesitate to call me. I look forward to following Raffy Rutherford along with you.    Sincerely,    Jackie Ocampo, OD    Enclosure  CC:  No Recipients    If you would like to receive this communication electronically, please contact [email or web address] or [phone] to request more information on [facility] Link access.    For providers and/or their staff who would like to refer a patient to [facility], please contact us through our one-stop-shop provider referral line, Hal Padilla, at [phone].    If you feel you have received this communication in error or would no longer like to receive these types of communications, please e-mail [email or web address]

## 2017-05-29 NOTE — PROGRESS NOTES
HPI     Raffy Rutherford is a/an 67 y.o. Male who returns  for continued eye care   He reports about headaches which to his opinion are caused by his left   eye  especially when looking at electronic devices ( near and intermedia   distance) with his current progressive lenses. His second complaint  is that ever since he started the restasis eye drops to treat his dry eyes   he feels as if his tinnitus ( ear ringing) gets a lot worse. He would   rather go back to the xidra.    (--)blurred vision  (+)Headaches  (--)diplopia  (--)flashes  (--)floaters  (--)pain  (--)Itching  (--)tearing  (--)burning  (--)Dryness  (--) OTC Drops  (--)Photophobia    Last edited by Bronwyn Bhagat on 5/29/2017  1:27 PM.   (History)            Assessment /Plan     For exam results, see Encounter Report.    Map-dot-fingerprint corneal dystrophy  Dry eye syndrome, bilateral]   Cornea appears to be improving since last visit   Continue with artificial tears QID   Discontinue restasis 2/2 SE of ringing in ears   Restart trials of  lifitegrast 5 % Dpet; Apply 1 drop to eye 2 (two) times daily.  Dispense: 1 each; Refill: 12        Blurred vision, left eye  Pseudophakia of both eyes  Hyperopia with astigmatism and presbyopia, bilateral   VA improved with new MRx    RTC Headaches worsen or do not improve

## 2017-06-02 ENCOUNTER — CLINICAL SUPPORT (OUTPATIENT)
Dept: REHABILITATION | Facility: HOSPITAL | Age: 67
End: 2017-06-02
Attending: ORTHOPAEDIC SURGERY
Payer: COMMERCIAL

## 2017-06-02 DIAGNOSIS — M25.552 LEFT HIP PAIN: ICD-10-CM

## 2017-06-02 PROCEDURE — 97110 THERAPEUTIC EXERCISES: CPT

## 2017-06-02 PROCEDURE — 97140 MANUAL THERAPY 1/> REGIONS: CPT

## 2017-06-05 NOTE — PROGRESS NOTES
Physical Therapy Visit Note    Name: Raffy Rutherford Jr.  Clinic Number: [id number]      Medical Diagnosis:   Encounter Diagnosis   Name Primary?    Left hip pain      PT Diagnosis: Left hip pain  Physician: Yolanda Mccollum MD  Treatment Orders: PT Eval and Treat    Past Medical History:   Diagnosis Date    Adenomatous polyp 2003    Adenomatous polyp     Allergy     Arthritis     Back pain     after trauma beginning in 195    Chronic pain     neck and left shoulder    Cluster headache 2013    Colon polyp     Degenerative disc disease     Depression     Diverticulitis 12/2013    Elevated PSA     Fibromyalgia 2013    GERD (gastroesophageal reflux disease)     Hepatitis 1970's    A    History of prostate biopsy 2002    Hyperlipidemia     Joint pain     Sleep apnea     Special screening for malignant neoplasms, colon 5/5/2014    Thyroid nodule 7/16/2014    Visual disturbance 2012    problems after cataract surgery     Current Outpatient Prescriptions   Medication Sig    acetaZOLAMIDE (DIAMOX) 250 MG tablet     benzonatate (TESSALON) 100 MG capsule Take 200 mg by mouth 3 (three) times daily as needed.    clonazePAM (KLONOPIN) 0.5 MG tablet Take 2 tablets (1 mg total) by mouth once daily.    cyanocobalamin 1,000 mcg/mL injection     fish oil-omega-3 fatty acids 300-1,000 mg capsule Take 2 g by mouth once daily.    hydrocortisone-pramoxine (ANALPRAM-HC) 2.5-1 % Crea 2.5 mg.    ketoconazole (NIZORAL) 2 % cream Apply to affected area 2 times a day for 2 weeks    lamotrigine (LAMICTAL) 100 MG tablet Take 1 tablet (100 mg total) by mouth once daily.    lidocaine-prilocaine (EMLA) cream     lifitegrast 5 % Dpet Apply 1 drop to eye 2 (two) times daily.    meclizine (ANTIVERT) 25 mg tablet Take 1 tablet (25 mg total) by mouth every 6 (six) hours. Prn vertigo (Patient taking differently: Take 25 mg by mouth every 6 (six) hours as needed. Prn vertigo)    meloxicam (MOBIC) 15 MG tablet Take 1 tablet (15  mg total) by mouth once daily. Take a Prilosec 20 mg tablet (over the counter) once a day every day while taking Mobic    pravastatin (PRAVACHOL) 40 MG tablet Take 1 tablet (40 mg total) by mouth once daily.    rabeprazole (ACIPHEX) 20 mg tablet Take 1 tablet (20 mg total) by mouth 2 (two) times daily. (Patient taking differently: Take 20 mg by mouth once daily. )    selegiline (EMSAM) 12 mg/24 hr Place 1 patch onto the skin once daily.    selegiline (EMSAM) 12 mg/24 hr Place 1 patch onto the skin once daily.    senna-docusate 8.6-50 mg (PERICOLACE) 8.6-50 mg per tablet Take 2 tablets by mouth nightly as needed for Constipation.    sucralfate (CARAFATE) 1 gram tablet as needed.     trazodone (DESYREL) 100 MG tablet Take 2 tablets (200 mg total) by mouth every evening.    UNABLE TO FIND medication name: cefaly + electrodes, wear 20-30 mins, Dx: migraine    VITAMIN D2 50,000 unit capsule TAKE 1 CAPSULE BY MOUTH TWICE A WEEK    VOLTAREN 1 % Gel      No current facility-administered medications for this visit.      Review of patient's allergies indicates:   Allergen Reactions    Alphagan [brimonidine]      Patient taking MASO-B Selective Inhibitor Selegiline (Emsam)    Coumadin [warfarin]      itch    Oxycodone      hiccups       Precautions: Fall Risk    Today's Date:  6/2/2017  Evaluation Date: 5/16/2017  Visit # authorized: 3/20  Authorization period: 12/31/2017  Time In:  1400  TIme Out:  1510    Subjective     Patient states what we did last time did not exacerbate his hernia pain.  He has continued to not do anything at home but would like to see what he can tolerate.    Patient Goals: Decreased pain and difficulty with ADLs, music and work activities    Objective     Observation: Patient ambulates with mildly antalgic gait pattern.    Posture: Decreased lordosis, posterior pelvic tilt, increased thoracic kyphosis and anterior scapular positioning.    Hip Range of Motion:   Right Passive Left Passive  "  Flexion WNL 90 (+)   Abduction WNL WNL   Extension WNL 0    Ext. Rotation WNL WNL   Int. Rotation 30 15     Lower Extremity Strength  Right LE  Left LE    Quadriceps: 4/5 Quadriceps: 4/5   Hamstrings: 3+/5 Hamstrings: 3+/5   Iliopsoas: 3+/5 Iliopsoas: 3+/5   Hip extension:  3/5 Hip extension: 3/5   PGM: 3/5 PGM: 3/5   Dorsiflexion 3+/5 Dorsiflexion 3+/5   Plantarflexion 2/5 Plantarflexion 2/5     Functional Tests:  Bridge Test: Unable to perform full range.  DL Squat Test: Quad dominance, decreased glute control, unable to perform full range.  SLS EO: Poor <3s bilaterally.  Step Down:  Bilateral hip collapse, valgus, trunk lean.    Special Tests:   DARCY: (-)  ABDIEL:  (-)  FADIR: (-)  SIJ: Left upslip noted with apparent ~1/2" leg length discrepancy; corrected with long-axis distraction followed by pubic shotgun.    Flexibility:    Ely's test: R = Mildly restsricted ; L = Mildly restricted    Hamstrings: R = Moderately restricted ; L = Moderately restricted    Piriformis test: R = Mildly restricted ; L = Moderately restricted (reproducted symptoms)    Joint Mobility: Grossly hypomobile left hip mobility.    Palpation: Point tenderness surrounding SIJ L>R; L distal piriformis.    Edema: N/A    Functional Limitations Reports  Category: mobility  Tool: FOTO Hip  Score: 42%    TREATMENT:  Raffy received therapeutic exercises to develop strength and endurance, flexibility for 20 minutes including:  Bike L1/10'  Supine Figure-4 and Crossover piriformis stretch w/ towel for assist to decrease trunk flexion (improved pain and could feel stretch well)  Figure-4 pushing capsule stretch    Manual Therapy for joint mobilization, soft tissue mobilization and decreased pain for 35 minutes including:  SIJ long axis distraction for upslip correction followed by pubic shotgun  Lateral, inferior mobs with belt bilaterally  Prone Anterior hip mobs to tolerance  STM and trigger point release bilateral piriformis muscles as " tolerated    Not today:  TrA Sets 5sh x 20  Supine Figure 4 Stretch 1'x2B  Supine Crossover Piriformis Stretch 1'x2B  GTB Clam 20x2     Pt. Received cold pack x 10 min. To bilateral hips following treatment.    Instructed pt. Regarding: Proper technique with all exercises. Pt demo good understanding of the education provided. Raffy demonstrated good return demonstration of activities.  Pt/family was provided educational information, including: role of PT, goals for PT, scheduling - pt verbalized understanding. Discussed insurance limitations with pt.     Pt has no cultural, educational or language barriers to learning provided.    Assessment     Today's Assessment:  Patient stated he felt good after today's treatment.  Discussed that he may be a little sore from manual therapy today but the ice should help.  Attempt hip/core isometrics as tolerated.    This is a 67 y.o. male referred to outpatient physical therapy and presents with a medical diagnosis of left hip pain.  Pt's c/c is difficulty with ADLs, community and work activities.  Pt presents with signs and symptoms consistent with sacroiliac dysfunction and poor hip mobility with significant LE weakness and core stability. Pt will benefit from physcial therapy services, specifically core stabilization, gross lower extremity strengthening, normalized joint mobility and flexibility, progressing to functional mobility and gait training as tolerated, in order to maximize pain free and/or functional use of left hip.     Medical necessity is demonstrated by the following IMPAIRMENTS/PROMBLEM LIST:   1)Increase in pain level limiting function   2)Decreased strength   3)Decreased functional mobility   4)Decreased ambulation   5)Lack of HEP    Anticipated barriers to physical therapy: Chronicity of symptoms as well as unknown etiology of gait disturbance from neurological disorder    Pt's spiritual, cultural and educational needs considered and pt agreeable to plan of  "care and goals as stated below:     GOALS: Short Term Goals:  6-8 weeks  1.  Report decreased left hip pain  < / =  4/10  to increase tolerance for ambulation and ADLs.  2.  Increase ROM to WNL in all directions where limited in order to perform ADLs without difficulty.  3.  Increase gross core and lower extremity strength in order to perform 20 bridges correctly in order to demonstrate improved core and hip strength for normalized ambulation.  4.  Pt to tolerate HEP to improve ROM and independence with ADL's.  5.  Patient will report improved ease with ambulation and standing as desired for playing music.  6.  Patient will demonstrate normalized 6" step down in order to show functional improvement in strength and decreased pain.    Plan     Pt will be treated by physical therapy 1-2 times a week for Pt Education, HEP, therapeutic exercises, neuromuscular re-education, joint mobilizations, modalities prn to achieve established goals. Raffy may at times be seen by a PTA as part of the Rehab Team.     Cont PT for 6-8 weeks.     Becca Russell, PT, DPT    I certify the need for these services furnished under this plan of treatment and while under my care.______________________________ Physician/Referring Practitioner  Date of Signature        "

## 2017-06-07 ENCOUNTER — OFFICE VISIT (OUTPATIENT)
Dept: INTERNAL MEDICINE | Facility: CLINIC | Age: 67
End: 2017-06-07
Payer: COMMERCIAL

## 2017-06-07 VITALS
DIASTOLIC BLOOD PRESSURE: 60 MMHG | HEART RATE: 68 BPM | SYSTOLIC BLOOD PRESSURE: 124 MMHG | WEIGHT: 178.63 LBS | BODY MASS INDEX: 27.07 KG/M2 | HEIGHT: 68 IN

## 2017-06-07 DIAGNOSIS — Z71.89 CPAP USE COUNSELING: Primary | ICD-10-CM

## 2017-06-07 PROCEDURE — 99999 PR PBB SHADOW E&M-EST. PATIENT-LVL IV: CPT | Mod: PBBFAC,,, | Performed by: INTERNAL MEDICINE

## 2017-06-07 PROCEDURE — 99213 OFFICE O/P EST LOW 20 MIN: CPT | Mod: S$GLB,,, | Performed by: INTERNAL MEDICINE

## 2017-06-07 NOTE — PROGRESS NOTES
Subjective:       Patient ID: Raffy Rutherford Jr. is a 67 y.o. male.    Chief Complaint: Follow-up (CPap)    HPI     Patient is here to discuss CPAP: patient's settings seem to working well. He is using the equipment nightly . He does see a benefit being a little less tired during the day and is waking up less during the night. He does use humidification and is aware of how to clean the equipment.     Patient has seasonal affected symptoms that improve in the summer.        Patient still is having problem with his leg, healthy back,  and PT.    He has developed an inguinal hernia which is not hurting at this time.    Patient has dry eyes  Review of Systems   Constitutional: Negative for activity change, appetite change, chills, fever and unexpected weight change.   Respiratory: Negative for cough, chest tightness, shortness of breath and wheezing.    Cardiovascular: Negative for chest pain, palpitations and leg swelling.   Gastrointestinal: Negative for abdominal pain, blood in stool, diarrhea, nausea and vomiting.       Objective:      Physical Exam   Constitutional: He appears well-developed and well-nourished. No distress.   Neck: Carotid bruit is not present. No thyromegaly present.   Cardiovascular: Normal rate, regular rhythm and normal heart sounds.    Pulmonary/Chest: Effort normal and breath sounds normal. No respiratory distress.   Musculoskeletal: He exhibits no edema.       Assessment:       1. CPAP use counseling        Plan:       Raffy was seen today for follow-up.    Diagnoses and all orders for this visit:    CPAP use counseling: patient appropriately using the equipment and benefiting from the equipment       Return in about 7 months (around 1/7/2018) for Annual exam.    New Prescriptions    No medications on file       Modified Medications    No medications on file       No orders of the defined types were placed in this encounter.      Labs, studies and consults associated with  this visit were reviewed

## 2017-06-07 NOTE — LETTER
June 7, 2017    Raffy Rutherford Jr.  [address] [facility] [address]             [facility]  [address]  [facility] [address]  Phone: [phone]  Fax: [phone] To [facility]    Mr Raffy Rutherford presented to the office to discuss  his repsonse to CPAP. His   settings seem to working well. He is using the equipment nightly . He does see a benefit being a  less tired during the day and is waking up less during the night. He does use humidification and is aware of how to clean the equipment. He does feel a benefit from the equipment      If you have any questions or concerns, please don't hesitate to call.    Sincerely,          Nini Rendon MD

## 2017-06-08 ENCOUNTER — TELEPHONE (OUTPATIENT)
Dept: OTOLARYNGOLOGY | Facility: CLINIC | Age: 67
End: 2017-06-08

## 2017-06-08 ENCOUNTER — CLINICAL SUPPORT (OUTPATIENT)
Dept: REHABILITATION | Facility: HOSPITAL | Age: 67
End: 2017-06-08
Attending: ORTHOPAEDIC SURGERY
Payer: COMMERCIAL

## 2017-06-08 DIAGNOSIS — M25.552 LEFT HIP PAIN: ICD-10-CM

## 2017-06-08 PROCEDURE — 97110 THERAPEUTIC EXERCISES: CPT

## 2017-06-08 PROCEDURE — 97140 MANUAL THERAPY 1/> REGIONS: CPT

## 2017-06-08 NOTE — TELEPHONE ENCOUNTER
----- Message from Jr Nick sent at 6/8/2017  8:10 AM CDT -----  Contact: Pt  Pt called requesting to be fit into schedule by provider by staff to be seen Monday for an ear cleaning, please follow up at soonest convenience

## 2017-06-08 NOTE — PROGRESS NOTES
Physical Therapy Visit Note    Name: Raffy Rutherford Jr.  Clinic Number: [id number]      Medical Diagnosis:   Encounter Diagnosis   Name Primary?    Left hip pain      PT Diagnosis: Left hip pain  Physician: Yolanda Mccollum MD  Treatment Orders: PT Eval and Treat    Past Medical History:   Diagnosis Date    Adenomatous polyp 2003    Adenomatous polyp     Allergy     Arthritis     Back pain     after trauma beginning in 195    Chronic pain     neck and left shoulder    Cluster headache 2013    Colon polyp     Degenerative disc disease     Depression     Diverticulitis 12/2013    Elevated PSA     Fibromyalgia 2013    GERD (gastroesophageal reflux disease)     Hepatitis 1970's    A    History of prostate biopsy 2002    Hyperlipidemia     Joint pain     Sleep apnea     Special screening for malignant neoplasms, colon 5/5/2014    Thyroid nodule 7/16/2014    Visual disturbance 2012    problems after cataract surgery     Current Outpatient Prescriptions   Medication Sig    acetaZOLAMIDE (DIAMOX) 250 MG tablet     benzonatate (TESSALON) 100 MG capsule Take 200 mg by mouth 3 (three) times daily as needed.    clonazePAM (KLONOPIN) 0.5 MG tablet Take 2 tablets (1 mg total) by mouth once daily.    cyanocobalamin 1,000 mcg/mL injection     fish oil-omega-3 fatty acids 300-1,000 mg capsule Take 2 g by mouth once daily.    hydrocortisone-pramoxine (ANALPRAM-HC) 2.5-1 % Crea 2.5 mg.    ketoconazole (NIZORAL) 2 % cream Apply to affected area 2 times a day for 2 weeks    lamotrigine (LAMICTAL) 100 MG tablet Take 1 tablet (100 mg total) by mouth once daily.    lidocaine-prilocaine (EMLA) cream     lifitegrast 5 % Dpet Apply 1 drop to eye 2 (two) times daily.    meclizine (ANTIVERT) 25 mg tablet Take 1 tablet (25 mg total) by mouth every 6 (six) hours. Prn vertigo (Patient taking differently: Take 25 mg by mouth every 6 (six) hours as needed. Prn vertigo)    meloxicam (MOBIC) 15 MG tablet Take 1 tablet (15  mg total) by mouth once daily. Take a Prilosec 20 mg tablet (over the counter) once a day every day while taking Mobic    pravastatin (PRAVACHOL) 40 MG tablet Take 1 tablet (40 mg total) by mouth once daily.    rabeprazole (ACIPHEX) 20 mg tablet Take 1 tablet (20 mg total) by mouth 2 (two) times daily. (Patient taking differently: Take 20 mg by mouth once daily. )    selegiline (EMSAM) 12 mg/24 hr Place 1 patch onto the skin once daily.    selegiline (EMSAM) 12 mg/24 hr Place 1 patch onto the skin once daily.    senna-docusate 8.6-50 mg (PERICOLACE) 8.6-50 mg per tablet Take 2 tablets by mouth nightly as needed for Constipation.    sucralfate (CARAFATE) 1 gram tablet as needed.     trazodone (DESYREL) 100 MG tablet Take 2 tablets (200 mg total) by mouth every evening.    UNABLE TO FIND medication name: cefaly + electrodes, wear 20-30 mins, Dx: migraine    VITAMIN D2 50,000 unit capsule TAKE 1 CAPSULE BY MOUTH TWICE A WEEK    VOLTAREN 1 % Gel      No current facility-administered medications for this visit.      Review of patient's allergies indicates:   Allergen Reactions    Alphagan [brimonidine]      Patient taking MASO-B Selective Inhibitor Selegiline (Emsam)    Coumadin [warfarin]      itch    Oxycodone      hiccups       Precautions: Fall Risk    Today's Date:  6/8/2017  Evaluation Date: 5/16/2017  Visit # authorized: 4/20  Authorization period: 12/31/2017  Time In:  1400  TIme Out:  1450    Subjective     Patient states he was feeling really good after last time until yesterday and now his left hip pain is about 5/10.    Patient Goals: Decreased pain and difficulty with ADLs, music and work activities    Objective     Observation: Patient ambulates with mildly antalgic gait pattern.    Posture: Decreased lordosis, posterior pelvic tilt, increased thoracic kyphosis and anterior scapular positioning.    Hip Range of Motion:   Right Passive Left Passive   Flexion WNL 90 (+)   Abduction WNL WNL  "  Extension WNL 0    Ext. Rotation WNL WNL   Int. Rotation 30 15     Lower Extremity Strength  Right LE  Left LE    Quadriceps: 4/5 Quadriceps: 4/5   Hamstrings: 3+/5 Hamstrings: 3+/5   Iliopsoas: 3+/5 Iliopsoas: 3+/5   Hip extension:  3/5 Hip extension: 3/5   PGM: 3/5 PGM: 3/5   Dorsiflexion 3+/5 Dorsiflexion 3+/5   Plantarflexion 2/5 Plantarflexion 2/5     Functional Tests:  Bridge Test: Unable to perform full range.  DL Squat Test: Quad dominance, decreased glute control, unable to perform full range.  SLS EO: Poor <3s bilaterally.  Step Down:  Bilateral hip collapse, valgus, trunk lean.    Special Tests:   DARCY: (-)  ABDIEL:  (-)  FADIR: (-)  SIJ: Left upslip noted with apparent ~1/2" leg length discrepancy; corrected with long-axis distraction followed by pubic shotgun.    Flexibility:    Ely's test: R = Mildly restsricted ; L = Mildly restricted    Hamstrings: R = Moderately restricted ; L = Moderately restricted    Piriformis test: R = Mildly restricted ; L = Moderately restricted (reproducted symptoms)    Joint Mobility: Grossly hypomobile left hip mobility.    Palpation: Point tenderness surrounding SIJ L>R; L distal piriformis.    Edema: N/A    Functional Limitations Reports  Category: mobility  Tool: FOTO Hip  Score: 42%    TREATMENT:  Raffy received therapeutic exercises to develop strength and endurance, flexibility for 10 minutes including:  Bike L1/10'  Supine Figure-4 and Crossover piriformis stretch w/ towel for assist to decrease trunk flexion (improved pain and could feel stretch well)  Figure-4 pushing capsule stretch    Manual Therapy for joint mobilization, soft tissue mobilization and decreased pain for 35 minutes including:  Lateral, inferior mobs with belt bilaterally  Prone Anterior hip mobs to tolerance  STM and trigger point release bilateral piriformis muscles as tolerated  Prone IR/ER Release posterior to greater trochanter  Neuromuscular Release    Not today:  TrA Sets 5sh x 20  Supine " Figure 4 Stretch 1'x2B  Supine Crossover Piriformis Stretch 1'x2B  GTB Clam 20x2     Pt. Received cold pack x 10 min. To bilateral hips following treatment.    Instructed pt. Regarding: Proper technique with all exercises. Pt demo good understanding of the education provided. Raffy demonstrated good return demonstration of activities.  Pt/family was provided educational information, including: role of PT, goals for PT, scheduling - pt verbalized understanding. Discussed insurance limitations with pt.     Pt has no cultural, educational or language barriers to learning provided.    Assessment     Today's Assessment: Patient felt better after today's treatment.  Discussed that we really need to progress his exercises and attempt stabilization again but patient wanted to wait until next week in case it exacerbated his hernia again because he has a lot to do this weekend.    This is a 67 y.o. male referred to outpatient physical therapy and presents with a medical diagnosis of left hip pain.  Pt's c/c is difficulty with ADLs, community and work activities.  Pt presents with signs and symptoms consistent with sacroiliac dysfunction and poor hip mobility with significant LE weakness and core stability. Pt will benefit from physcial therapy services, specifically core stabilization, gross lower extremity strengthening, normalized joint mobility and flexibility, progressing to functional mobility and gait training as tolerated, in order to maximize pain free and/or functional use of left hip.     Medical necessity is demonstrated by the following IMPAIRMENTS/PROMBLEM LIST:   1)Increase in pain level limiting function   2)Decreased strength   3)Decreased functional mobility   4)Decreased ambulation   5)Lack of HEP    Anticipated barriers to physical therapy: Chronicity of symptoms as well as unknown etiology of gait disturbance from neurological disorder    Pt's spiritual, cultural and educational needs considered and pt  "agreeable to plan of care and goals as stated below:     GOALS: Short Term Goals:  6-8 weeks  1.  Report decreased left hip pain  < / =  4/10  to increase tolerance for ambulation and ADLs.  2.  Increase ROM to WNL in all directions where limited in order to perform ADLs without difficulty.  3.  Increase gross core and lower extremity strength in order to perform 20 bridges correctly in order to demonstrate improved core and hip strength for normalized ambulation.  4.  Pt to tolerate HEP to improve ROM and independence with ADL's.  5.  Patient will report improved ease with ambulation and standing as desired for playing music.  6.  Patient will demonstrate normalized 6" step down in order to show functional improvement in strength and decreased pain.    Plan     Pt will be treated by physical therapy 1-2 times a week for Pt Education, HEP, therapeutic exercises, neuromuscular re-education, joint mobilizations, modalities prn to achieve established goals. Raffy may at times be seen by a PTA as part of the Rehab Team.     Cont PT for 6-8 weeks.     Becca Russell, PT, DPT    I certify the need for these services furnished under this plan of treatment and while under my care.______________________________ Physician/Referring Practitioner  Date of Signature        "

## 2017-06-12 ENCOUNTER — OFFICE VISIT (OUTPATIENT)
Dept: OTOLARYNGOLOGY | Facility: CLINIC | Age: 67
End: 2017-06-12
Payer: COMMERCIAL

## 2017-06-12 VITALS — TEMPERATURE: 98 F | SYSTOLIC BLOOD PRESSURE: 125 MMHG | HEART RATE: 70 BPM | DIASTOLIC BLOOD PRESSURE: 82 MMHG

## 2017-06-12 DIAGNOSIS — J34.89 NASAL OBSTRUCTION: ICD-10-CM

## 2017-06-12 DIAGNOSIS — H61.23 IMPACTED CERUMEN, BILATERAL: Primary | ICD-10-CM

## 2017-06-12 DIAGNOSIS — J33.8 POLYPOID MIDDLE TURBINATE: ICD-10-CM

## 2017-06-12 DIAGNOSIS — R51.9 DAILY HEADACHE: ICD-10-CM

## 2017-06-12 PROCEDURE — 69210 REMOVE IMPACTED EAR WAX UNI: CPT | Mod: S$GLB,,, | Performed by: OTOLARYNGOLOGY

## 2017-06-12 PROCEDURE — 99999 PR PBB SHADOW E&M-EST. PATIENT-LVL III: CPT | Mod: PBBFAC,,, | Performed by: OTOLARYNGOLOGY

## 2017-06-12 PROCEDURE — 99212 OFFICE O/P EST SF 10 MIN: CPT | Mod: 25,S$GLB,, | Performed by: OTOLARYNGOLOGY

## 2017-06-12 NOTE — PATIENT INSTRUCTIONS
Cerumen impactions removed from both eacs  Trial of Excedrin Migraine when necessary for headaches  Nasacort nasal spray use encouraged; 1-2 sprays left nasal passage daily × 6 weeks  Follow-up re-left nasal status/headaches when necessary  RTC 6 months for ear cleaning

## 2017-06-22 ENCOUNTER — CLINICAL SUPPORT (OUTPATIENT)
Dept: REHABILITATION | Facility: HOSPITAL | Age: 67
End: 2017-06-22
Attending: ORTHOPAEDIC SURGERY
Payer: COMMERCIAL

## 2017-06-22 DIAGNOSIS — M25.552 LEFT HIP PAIN: ICD-10-CM

## 2017-06-22 PROCEDURE — 97110 THERAPEUTIC EXERCISES: CPT

## 2017-06-22 NOTE — PROGRESS NOTES
Physical Therapy Visit Note    Name: Raffy Rutherford Jr.  Clinic Number: [id number]      Medical Diagnosis:   Encounter Diagnosis   Name Primary?    Left hip pain      PT Diagnosis: Left hip pain  Physician: Yolanda Mccollum MD  Treatment Orders: PT Eval and Treat    Past Medical History:   Diagnosis Date    Adenomatous polyp 2003    Adenomatous polyp     Allergy     Arthritis     Back pain     after trauma beginning in 195    Chronic pain     neck and left shoulder    Cluster headache 2013    Colon polyp     Degenerative disc disease     Depression     Diverticulitis 12/2013    Elevated PSA     Fibromyalgia 2013    GERD (gastroesophageal reflux disease)     Hepatitis 1970's    A    History of prostate biopsy 2002    Hyperlipidemia     Joint pain     Sleep apnea     Special screening for malignant neoplasms, colon 5/5/2014    Thyroid nodule 7/16/2014    Visual disturbance 2012    problems after cataract surgery     Current Outpatient Prescriptions   Medication Sig    acetaZOLAMIDE (DIAMOX) 250 MG tablet     benzonatate (TESSALON) 100 MG capsule Take 200 mg by mouth 3 (three) times daily as needed.    clonazePAM (KLONOPIN) 0.5 MG tablet Take 2 tablets (1 mg total) by mouth once daily.    cyanocobalamin 1,000 mcg/mL injection     fish oil-omega-3 fatty acids 300-1,000 mg capsule Take 2 g by mouth once daily.    hydrocortisone-pramoxine (ANALPRAM-HC) 2.5-1 % Crea 2.5 mg.    ketoconazole (NIZORAL) 2 % cream Apply to affected area 2 times a day for 2 weeks    lamotrigine (LAMICTAL) 100 MG tablet Take 1 tablet (100 mg total) by mouth once daily.    lidocaine-prilocaine (EMLA) cream     lifitegrast 5 % Dpet Apply 1 drop to eye 2 (two) times daily.    meclizine (ANTIVERT) 25 mg tablet Take 1 tablet (25 mg total) by mouth every 6 (six) hours. Prn vertigo (Patient taking differently: Take 25 mg by mouth every 6 (six) hours as needed. Prn vertigo)    meloxicam (MOBIC) 15 MG tablet Take 1 tablet (15  mg total) by mouth once daily. Take a Prilosec 20 mg tablet (over the counter) once a day every day while taking Mobic    pravastatin (PRAVACHOL) 40 MG tablet Take 1 tablet (40 mg total) by mouth once daily.    rabeprazole (ACIPHEX) 20 mg tablet Take 1 tablet (20 mg total) by mouth 2 (two) times daily. (Patient taking differently: Take 20 mg by mouth once daily. )    selegiline (EMSAM) 12 mg/24 hr Place 1 patch onto the skin once daily.    selegiline (EMSAM) 12 mg/24 hr Place 1 patch onto the skin once daily.    senna-docusate 8.6-50 mg (PERICOLACE) 8.6-50 mg per tablet Take 2 tablets by mouth nightly as needed for Constipation.    sucralfate (CARAFATE) 1 gram tablet as needed.     trazodone (DESYREL) 100 MG tablet Take 2 tablets (200 mg total) by mouth every evening.    UNABLE TO FIND medication name: cefaly + electrodes, wear 20-30 mins, Dx: migraine    VITAMIN D2 50,000 unit capsule TAKE 1 CAPSULE BY MOUTH TWICE A WEEK    VOLTAREN 1 % Gel      No current facility-administered medications for this visit.      Review of patient's allergies indicates:   Allergen Reactions    Alphagan [brimonidine]      Patient taking MASO-B Selective Inhibitor Selegiline (Emsam)    Coumadin [warfarin]      itch    Oxycodone      hiccups       Precautions: Fall Risk    Today's Date:  6/22/2017  Evaluation Date: 5/16/2017  Visit # authorized: 5/20  Authorization period: 12/31/2017  Time In:  1500  TIme Out:  1555    Subjective     Patient states he has been feeling fine and thinks we have reached a plateau in the pain relief.  He saw hernia MD who told him it was up to him if he wanted to repair it or not.  He wants to know if we should be done or if there is more we can do.    Patient Goals: Decreased pain and difficulty with ADLs, music and work activities    Objective     Observation: Patient ambulates with mildly antalgic gait pattern.    Posture: Decreased lordosis, posterior pelvic tilt, increased thoracic kyphosis  "and anterior scapular positioning.    Hip Range of Motion:   Right Passive Left Passive   Flexion WNL 90 (+)   Abduction WNL WNL   Extension WNL 0    Ext. Rotation WNL WNL   Int. Rotation 30 15     Lower Extremity Strength  Right LE  Left LE    Quadriceps: 4/5 Quadriceps: 4/5   Hamstrings: 3+/5 Hamstrings: 3+/5   Iliopsoas: 3+/5 Iliopsoas: 3+/5   Hip extension:  3/5 Hip extension: 3/5   PGM: 3/5 PGM: 3/5   Dorsiflexion 3+/5 Dorsiflexion 3+/5   Plantarflexion 2/5 Plantarflexion 2/5     Functional Tests:  Bridge Test: Unable to perform full range.  DL Squat Test: Quad dominance, decreased glute control, unable to perform full range.  SLS EO: Poor <3s bilaterally.  Step Down:  Bilateral hip collapse, valgus, trunk lean.    Special Tests:   DARCY: (-)  ABDIEL:  (-)  FADIR: (-)  SIJ: Left upslip noted with apparent ~1/2" leg length discrepancy; corrected with long-axis distraction followed by pubic shotgun.    Flexibility:    Ely's test: R = Mildly restsricted ; L = Mildly restricted    Hamstrings: R = Moderately restricted ; L = Moderately restricted    Piriformis test: R = Mildly restricted ; L = Moderately restricted (reproducted symptoms)    Joint Mobility: Grossly hypomobile left hip mobility.    Palpation: Point tenderness surrounding SIJ L>R; L distal piriformis.    Edema: N/A    Functional Limitations Reports  Category: mobility  Tool: FOTO Hip  Score: 42%    TREATMENT:  Raffy received therapeutic exercises to develop strength and endurance, flexibility for 40 minutes including:  Bike L1/8'  TrA Sets 5sh x 20  TrA Marching 3'  BKFO 3'  TrA Add Ball Sq 5sh x 20  GTB Supine Clam to fatigue x 2  GTB Sidelying Clam 20x2B  Bridging 5sh to fatigue )~15)    Manual Therapy for joint mobilization, soft tissue mobilization and decreased pain for 0 minutes including:  Lateral, inferior mobs with belt bilaterally  Prone Anterior hip mobs to tolerance  STM and trigger point release bilateral piriformis muscles as " tolerated  Prone IR/ER Release posterior to greater trochanter  Neuromuscular Release    Not today:  Supine Figure 4 Stretch 1'x2B  Supine Crossover Piriformis Stretch 1'x2B    Pt. Received cold pack x 10 min. To bilateral hips following treatment.    Instructed pt. Regarding: Proper technique with all exercises. Pt demo good understanding of the education provided. Raffy demonstrated good return demonstration of activities.  Pt/family was provided educational information, including: role of PT, goals for PT, scheduling - pt verbalized understanding. Discussed insurance limitations with pt.     Pt has no cultural, educational or language barriers to learning provided.    Assessment     Today's Assessment: Discussed at length that we really need to progress his strengthening and he will not make more progress without it.  Discussed that if we are not able to do it without causing pain, he will need the hernia repair in order to make more progress.  Assess tolerance to exercises and progress as able.  After exercises patient reported he could already feel his hernia pain so he is not sure how it is going to go.  He will decide over the weekend if he is going to return.    This is a 67 y.o. male referred to outpatient physical therapy and presents with a medical diagnosis of left hip pain.  Pt's c/c is difficulty with ADLs, community and work activities.  Pt presents with signs and symptoms consistent with sacroiliac dysfunction and poor hip mobility with significant LE weakness and core stability. Pt will benefit from physcial therapy services, specifically core stabilization, gross lower extremity strengthening, normalized joint mobility and flexibility, progressing to functional mobility and gait training as tolerated, in order to maximize pain free and/or functional use of left hip.     Medical necessity is demonstrated by the following IMPAIRMENTS/PROMBLEM LIST:   1)Increase in pain level limiting  "function   2)Decreased strength   3)Decreased functional mobility   4)Decreased ambulation   5)Lack of HEP    Anticipated barriers to physical therapy: Chronicity of symptoms as well as unknown etiology of gait disturbance from neurological disorder    Pt's spiritual, cultural and educational needs considered and pt agreeable to plan of care and goals as stated below:     GOALS: Short Term Goals:  6-8 weeks  1.  Report decreased left hip pain  < / =  4/10  to increase tolerance for ambulation and ADLs.  2.  Increase ROM to WNL in all directions where limited in order to perform ADLs without difficulty.  3.  Increase gross core and lower extremity strength in order to perform 20 bridges correctly in order to demonstrate improved core and hip strength for normalized ambulation.  4.  Pt to tolerate HEP to improve ROM and independence with ADL's.  5.  Patient will report improved ease with ambulation and standing as desired for playing music.  6.  Patient will demonstrate normalized 6" step down in order to show functional improvement in strength and decreased pain.    Plan     Pt will be treated by physical therapy 1-2 times a week for Pt Education, HEP, therapeutic exercises, neuromuscular re-education, joint mobilizations, modalities prn to achieve established goals. Raffy may at times be seen by a PTA as part of the Rehab Team.     Cont PT for 6-8 weeks.     Becca Russell, PT, DPT    I certify the need for these services furnished under this plan of treatment and while under my care.______________________________ Physician/Referring Practitioner  Date of Signature        "

## 2017-06-26 ENCOUNTER — OFFICE VISIT (OUTPATIENT)
Dept: GASTROENTEROLOGY | Facility: CLINIC | Age: 67
End: 2017-06-26
Payer: COMMERCIAL

## 2017-06-26 VITALS
HEART RATE: 73 BPM | WEIGHT: 181.44 LBS | BODY MASS INDEX: 26.87 KG/M2 | SYSTOLIC BLOOD PRESSURE: 119 MMHG | DIASTOLIC BLOOD PRESSURE: 76 MMHG | HEIGHT: 69 IN

## 2017-06-26 DIAGNOSIS — K21.9 GASTROESOPHAGEAL REFLUX DISEASE WITHOUT ESOPHAGITIS: Primary | ICD-10-CM

## 2017-06-26 PROCEDURE — 99214 OFFICE O/P EST MOD 30 MIN: CPT | Mod: S$GLB,,, | Performed by: INTERNAL MEDICINE

## 2017-06-26 PROCEDURE — 99999 PR PBB SHADOW E&M-EST. PATIENT-LVL III: CPT | Mod: PBBFAC,,, | Performed by: INTERNAL MEDICINE

## 2017-06-26 NOTE — PROGRESS NOTES
HISTORY OF PRESENT ILLNESS:  This is a followup visit for Raffy Rutherford.    Actually, it has been almost 10 years since I have seen him in the office, but I   did do a scope on him two years ago.  In April 2015, EGD showed a small hiatal   hernia.  There was no esophagitis or reflux, injury or Cardoso's seen.  He has   been on Aciphex, possibly even for 20 years.    He does have a long history of acid reflux and generally Aciphex keeps him very   well controlled.  He takes one dose in the morning.  He does have occasional   breakthrough symptoms.  When he does have breakthrough, it manifests as   heartburn or pyrosis.  In the past, he would have occasional breakthrough as a   regurgitation episode at night.  However, this has largely gone away ever since   he started sleeping with his head of bed elevated.  Sometimes he contribute   flares of heartburn to spicy foods or coffee intake.  He possibly has a flare   every two weeks or so, although he will go sometimes a month or two without any   flares.  He gets relief by taking H2 blocker or Carafate.  He does complain of   some gas and belching.  His bowel movements are normal.  There is no abdominal   pain.  There is no dysphagia.    SOCIAL HISTORY:  He sleeps head of bed elevated.  He is avoiding dairy.  He   tries to find high pH foods.  He is very careful about his diet.  In general, he   has an early diet.    ASSESSMENT AND DECISION MAKING:  Gastroesophageal reflux.  He had some concern   about his long-term treatment.  He has been on Aciphex for a number of years and   in general, this gives good relief.  We have already shown that he gives good   endoscopic exam.  We discussed the natural history of reflux.  We could try to   switch him over to an H2 blocker, but I think that given his history, he would   almost have immediate severe symptoms.  I do not think he would tolerate even   twice daily H2 blockers.  We discussed alternatives to PPIs, which would be    surgical approaches.  I discussed antireflux fundoplication surgery and the   endoscopic Stretta procedure.  I reviewed the risk and benefits of those more   invasive approaches.  We did discuss realistically the low risk of known   complications from PPI and the possibility that there are unknown risks that may   surface in the years to come.  For the time being, we are going to continue on   the Aciphex.  It is okay if he does breakthrough symptoms with either Carafate   or H2 blockers.  If there is any escalation of breakthrough symptoms, then I   might repeat EGD, but there is no reason to repeat the EGD now.  I tried to give   as much reassurance that we will try and keep up with potential complications   from PPI therapy and alert him if there are any changes.    A 25-minute appointment, greater than half the time face-to-face counseling.      YOUSUF  dd: 06/26/2017 16:34:08 (CDT)  td: 07/12/2017 21:35:28 (CDT)  Doc ID   #[id number]  Job ID #[id number]    CC:

## 2017-07-17 ENCOUNTER — TELEPHONE (OUTPATIENT)
Dept: OPHTHALMOLOGY | Facility: CLINIC | Age: 67
End: 2017-07-17

## 2017-07-17 DIAGNOSIS — M79.641 BILATERAL HAND PAIN: Primary | ICD-10-CM

## 2017-07-17 DIAGNOSIS — M79.642 BILATERAL HAND PAIN: Primary | ICD-10-CM

## 2017-07-17 NOTE — TELEPHONE ENCOUNTER
----- Message from Sherita Das sent at 7/17/2017  9:39 AM CDT -----  Contact: Raffy Rutherford Jr.  Pt would like to speak with  nurse to scheduled the appointment pt can be reached at [phone] please thanks.

## 2017-07-18 ENCOUNTER — HOSPITAL ENCOUNTER (OUTPATIENT)
Dept: RADIOLOGY | Facility: OTHER | Age: 67
Discharge: HOME | End: 2017-07-18
Attending: ORTHOPAEDIC SURGERY
Payer: COMMERCIAL

## 2017-07-18 ENCOUNTER — OFFICE VISIT (OUTPATIENT)
Dept: ORTHOPEDICS | Facility: CLINIC | Age: 67
End: 2017-07-18
Payer: COMMERCIAL

## 2017-07-18 VITALS
HEART RATE: 70 BPM | SYSTOLIC BLOOD PRESSURE: 115 MMHG | WEIGHT: 181 LBS | HEIGHT: 69 IN | DIASTOLIC BLOOD PRESSURE: 74 MMHG | BODY MASS INDEX: 26.81 KG/M2

## 2017-07-18 DIAGNOSIS — M79.642 BILATERAL HAND PAIN: ICD-10-CM

## 2017-07-18 DIAGNOSIS — M79.631 PAIN OF RIGHT FOREARM: Primary | ICD-10-CM

## 2017-07-18 DIAGNOSIS — M79.641 BILATERAL HAND PAIN: ICD-10-CM

## 2017-07-18 DIAGNOSIS — M79.631 PAIN OF RIGHT FOREARM: ICD-10-CM

## 2017-07-18 DIAGNOSIS — M77.8 TENDONITIS OF WRIST, RIGHT: Primary | ICD-10-CM

## 2017-07-18 PROCEDURE — 73090 X-RAY EXAM OF FOREARM: CPT | Mod: TC,RT

## 2017-07-18 PROCEDURE — 73130 X-RAY EXAM OF HAND: CPT | Mod: 50,TC

## 2017-07-18 PROCEDURE — 97760 ORTHOTIC MGMT&TRAING 1ST ENC: CPT | Mod: S$GLB,,, | Performed by: PHYSICIAN ASSISTANT

## 2017-07-18 PROCEDURE — 99999 PR PBB SHADOW E&M-EST. PATIENT-LVL III: CPT | Mod: PBBFAC,,, | Performed by: ORTHOPAEDIC SURGERY

## 2017-07-18 PROCEDURE — 99203 OFFICE O/P NEW LOW 30 MIN: CPT | Mod: S$GLB,,, | Performed by: ORTHOPAEDIC SURGERY

## 2017-07-18 PROCEDURE — 73130 X-RAY EXAM OF HAND: CPT | Mod: 26,50,, | Performed by: RADIOLOGY

## 2017-07-18 PROCEDURE — 73090 X-RAY EXAM OF FOREARM: CPT | Mod: 26,RT,, | Performed by: RADIOLOGY

## 2017-07-18 RX ORDER — MELOXICAM 15 MG/1
15 TABLET ORAL DAILY
Qty: 30 TABLET | Refills: 2 | Status: SHIPPED | OUTPATIENT
Start: 2017-07-18 | End: 2018-01-25 | Stop reason: SDUPTHER

## 2017-07-18 NOTE — PROGRESS NOTES
CC: Right wrist pain      HPI:  Raffy Rutherford Jr. is a very pleasant 67 y.o. hand dominant male presented today for evaluation of right wrist pain.  He reports that he is having pain and burning sensation on the volar aspect of his right wrist.  It began about a month ago.  He reports that he started a new exercise program and started.  He also plays the bass and feels that it may aggravate his symptoms.  He has tried a brace with no improvement in symptoms.  He is currently on Mobic.         PAST MEDICAL HISTORY:   Past Medical History:   Diagnosis Date    Adenomatous polyp 2003    Adenomatous polyp     Allergy     Arthritis     Back pain     after trauma beginning in 195    Chronic pain     neck and left shoulder    Cluster headache 2013    Colon polyp     Degenerative disc disease     Depression     Diverticulitis 12/2013    Elevated PSA     Fibromyalgia 2013    GERD (gastroesophageal reflux disease)     Hepatitis 1970's    A    History of prostate biopsy 2002    Hyperlipidemia     Joint pain     Sleep apnea     Special screening for malignant neoplasms, colon 5/5/2014    Thyroid nodule 7/16/2014    Visual disturbance 2012    problems after cataract surgery     PAST SURGICAL HISTORY:   Past Surgical History:   Procedure Laterality Date    BACK SURGERY      CATARACT EXTRACTION W/  INTRAOCULAR LENS IMPLANT Bilateral     CHOLECYSTECTOMY      COSMETIC SURGERY  2/10/2015    Direct mid-forehead brow lift    COSMETIC SURGERY  2/10/2015    Bilateral upper lid blepahroplasty    EYE SURGERY      HEMORRHOID SURGERY      with complication of chronic bleeding for 6 weeks     JOINT REPLACEMENT      KNEE SURGERY      involving arthroscopic surgery to both knees    SINUS SURGERY      SINUS SURGERY      left molar and sinus surgery for trigeminal neuralgia    SPINAL FUSION  6/22/2015    L3-L5 XLIF    SPINE SURGERY  6/22/15    XLIF/TANA    TOTAL HIP ARTHROPLASTY  4/2012    Pt states he had  total hip replacement on his left hip.     FAMILY HISTORY:   Family History   Problem Relation Age of Onset    Cancer Mother      colon; uterine    Nephrolithiasis Mother     Stroke Mother 86    Pancreatitis Brother     Vision loss Brother      macular degeneration    Diabetes Brother     Macular degeneration Brother     Migraines Sister     No Known Problems Father     No Known Problems Maternal Grandmother     No Known Problems Maternal Grandfather     No Known Problems Paternal Grandmother     No Known Problems Paternal Grandfather     No Known Problems Sister     No Known Problems Maternal Aunt     No Known Problems Maternal Uncle     No Known Problems Paternal Aunt     No Known Problems Paternal Uncle     Melanoma Neg Hx     Amblyopia Neg Hx     Blindness Neg Hx     Cataracts Neg Hx     Glaucoma Neg Hx     Hypertension Neg Hx     Retinal detachment Neg Hx     Strabismus Neg Hx     Thyroid disease Neg Hx      SOCIAL HISTORY:   Social History     Social History    Marital status:      Spouse name: N/A    Number of children: N/A    Years of education: N/A     Occupational History    Psychotherpist       Social History Main Topics    Smoking status: Never Smoker    Smokeless tobacco: Never Used    Alcohol use Yes      Comment: occasion    Drug use: No    Sexual activity: Yes     Partners: Female     Other Topics Concern    Not on file     Social History Narrative    No narrative on file       MEDICATIONS:   Current Outpatient Prescriptions:     acetaZOLAMIDE (DIAMOX) 250 MG tablet, , Disp: , Rfl:     benzonatate (TESSALON) 100 MG capsule, Take 200 mg by mouth 3 (three) times daily as needed., Disp: , Rfl: 1    clonazePAM (KLONOPIN) 0.5 MG tablet, Take 2 tablets (1 mg total) by mouth once daily., Disp: 180 tablet, Rfl: 3    cyanocobalamin 1,000 mcg/mL injection, , Disp: , Rfl: 1    fish oil-omega-3 fatty acids 300-1,000 mg capsule, Take 2 g by mouth once  daily., Disp: , Rfl:     hydrocortisone-pramoxine (ANALPRAM-HC) 2.5-1 % Crea, 2.5 mg., Disp: , Rfl:     ketoconazole (NIZORAL) 2 % cream, Apply to affected area 2 times a day for 2 weeks, Disp: , Rfl: 1    lamotrigine (LAMICTAL) 100 MG tablet, Take 1 tablet (100 mg total) by mouth once daily., Disp: 90 tablet, Rfl: 3    lidocaine-prilocaine (EMLA) cream, , Disp: , Rfl:     lifitegrast 5 % Dpet, Apply 1 drop to eye 2 (two) times daily., Disp: 1 each, Rfl: 12    meclizine (ANTIVERT) 25 mg tablet, Take 1 tablet (25 mg total) by mouth every 6 (six) hours. Prn vertigo (Patient taking differently: Take 25 mg by mouth every 6 (six) hours as needed. Prn vertigo), Disp: 25 tablet, Rfl: 1    pravastatin (PRAVACHOL) 40 MG tablet, Take 1 tablet (40 mg total) by mouth once daily., Disp: 90 tablet, Rfl: 3    rabeprazole (ACIPHEX) 20 mg tablet, Take 1 tablet (20 mg total) by mouth 2 (two) times daily. (Patient taking differently: Take 20 mg by mouth once daily. ), Disp: 180 tablet, Rfl: 3    selegiline (EMSAM) 12 mg/24 hr, Place 1 patch onto the skin once daily., Disp: 90 patch, Rfl: 3    selegiline (EMSAM) 12 mg/24 hr, Place 1 patch onto the skin once daily., Disp: , Rfl:     senna-docusate 8.6-50 mg (PERICOLACE) 8.6-50 mg per tablet, Take 2 tablets by mouth nightly as needed for Constipation., Disp: , Rfl:     sucralfate (CARAFATE) 1 gram tablet, as needed. , Disp: , Rfl:     trazodone (DESYREL) 100 MG tablet, Take 2 tablets (200 mg total) by mouth every evening., Disp: 180 tablet, Rfl: 3    UNABLE TO FIND, medication name: cefaly + electrodes, wear 20-30 mins, Dx: migraine, Disp: 1 Units, Rfl: 0    VITAMIN D2 50,000 unit capsule, TAKE 1 CAPSULE BY MOUTH TWICE A WEEK, Disp: 24 capsule, Rfl: 3    VOLTAREN 1 % Gel, , Disp: , Rfl:     meloxicam (MOBIC) 15 MG tablet, Take 1 tablet (15 mg total) by mouth once daily., Disp: 30 tablet, Rfl: 2  ALLERGIES:   Review of patient's allergies indicates:   Allergen Reactions  "   Alphagan [brimonidine]      Patient taking MASO-B Selective Inhibitor Selegiline (Emsam)    Coumadin [warfarin]      itch    Oxycodone      hiccups       Review of Systems:  Constitutional: no fever or chills  ENT: no nasal congestion or sore throat  Respiratory: no cough or shortness of breath  Cardiovascular: no chest pain or palpitations  Gastrointestinal: no nausea or vomiting, PUD, GERD, NSAID intolerance  Genitourinary: no hematuria or dysuria  Integument/Breast: no rash or pruritis  Hematologic/Lymphatic: no easy bruising or lymphadenopathy  Musculoskeletal: see HPI  Neurological: no seizures or tremors  Behavioral/Psych: no auditory or visual hallucinations      Physical Exam   Vitals:    07/18/17 1419 07/18/17 1421   BP: 115/74    Pulse: 70    Weight: 82.1 kg (181 lb)    Height: 5' 9" (1.753 m)    PainSc:   5   5   PainLoc: Wrist        Constitutional: Oriented to person, place, and time. Appears well-developed and well-nourished.   HENT:   Head: Normocephalic and atraumatic.   Nose: Nose normal.   Eyes: No scleral icterus.   Neck: Normal range of motion.   Cardiovascular: Normal rate and regular rhythm.    Pulses:       Radial pulses are 2+ on the right side, and 2+ on the left side.   Pulmonary/Chest: Effort normal and breath sounds normal.   Abdominal: Soft.   Neurological: Alert and oriented to person, place, and time.   Skin: Skin is warm.   Psychiatric: Normal mood and affect.     MUSCULOSKELETAL UPPER EXTREMITY:  He is distally neurovascularly intact.  AIN PIN nerves are intact sensation of the radial ulnar median nerves intact.  Negative Tinel's negative Luann's.  Tenderness along the FCR and FCU of the wrist he has slight pain in the forearm with resisted wrist flexion no pain with resisted wrist extension negative middle finger test.            Diagnostic Studies:    No fractures dislocations on x-ray        Assessment:  Encounter Diagnoses   Name Primary?    Tendonitis of wrist, right Yes "       Plan:    Raffy was seen today for pain and pain.    Diagnoses and all orders for this visit:    Tendonitis of wrist, right  -     Ambulatory Referral to Physical/Occupational Therapy    Other orders  -     meloxicam (MOBIC) 15 MG tablet; Take 1 tablet (15 mg total) by mouth once daily.  -     Ambulatory Referral to Physical/Occupational Therapy            At this time he has a diagnosis of tendinitis of the right wrist.  He will continue his meloxicam start therapy for range of motion and modalities.  He will wear a brace to help rest the wrist.  He'll follow up in 8 weeks if needed.        Brittny Lackey PA-C  [facility]  Orthopedic Upper Extremity Surgery  Supervising Physician: Cici Carvajal MD

## 2017-07-18 NOTE — PROGRESS NOTES
"I have personally taken the history and examined the patient. I agree with the Hand Surgery PA's note. The plan will be OT and mobic. Pt has flexor tendinoisis most likely from his new work out routine that he describes. It is difficult now to play his instrument and use the flatter "apple" mouse. Plan for OT and RTC in 6-8 weeks  "

## 2017-07-21 ENCOUNTER — INITIAL CONSULT (OUTPATIENT)
Dept: OPHTHALMOLOGY | Facility: CLINIC | Age: 67
End: 2017-07-21
Payer: COMMERCIAL

## 2017-07-21 DIAGNOSIS — H57.12 EYE PAIN, LEFT: ICD-10-CM

## 2017-07-21 DIAGNOSIS — H18.529 ANTERIOR BASEMENT MEMBRANE DYSTROPHY: Primary | ICD-10-CM

## 2017-07-21 PROCEDURE — 99999 PR PBB SHADOW E&M-EST. PATIENT-LVL III: CPT | Mod: PBBFAC,,, | Performed by: OPHTHALMOLOGY

## 2017-07-21 PROCEDURE — 92014 COMPRE OPH EXAM EST PT 1/>: CPT | Mod: S$GLB,,, | Performed by: OPHTHALMOLOGY

## 2017-07-21 RX ORDER — DOXYCYCLINE 50 MG/1
50 CAPSULE ORAL 2 TIMES DAILY
Qty: 60 CAPSULE | Refills: 6 | Status: SHIPPED | OUTPATIENT
Start: 2017-07-21 | End: 2018-02-02 | Stop reason: ALTCHOICE

## 2017-07-21 NOTE — Clinical Note
Will call pt in 2 wks to see how OS feeling / painful episode frequency - increase / decrease/ same.

## 2017-07-21 NOTE — PROGRESS NOTES
HPI     Referred by Dr Ocampo     Pt referred by Dr Ocampo for eval map-dot fingerprint corneal dystrophy.    Pt states eye pain OS (6 on scale). HA / eye pain on left side.  Pain   lasts    Refresh PRN OU   Has used Xiidra and Restasis in the past with no improvement    Last edited by Melanie Sequeira MD on 7/21/2017  9:30 AM. (History)            Assessment /Plan     For exam results, see Encounter Report.    Anterior basement membrane dystrophy    Eye pain, left    Other orders  -     doxycycline (MONODOX) 50 MG Cap; Take 1 capsule (50 mg total) by mouth 2 (two) times daily.  Dispense: 60 capsule; Refill: 6      Left eye pain upon awakening / HA -- discussed ABMD OU and pain may be 2/2 RES.  Will trial the below and see if that helps alleviate sx, also discussed Super K if needed.     JOANNA 128 (2 OR 5%) - DROPS DURING DAY // OINTMENT AT NIGHT    REFRESH DROPS    DOXYCYCLINE - 1 PILL TWICE A DAY WITH FOOD AND WATER    Will call pt in 2 wks to see how OS feeling / painful episode frequency - increase / decrease/ same.

## 2017-07-21 NOTE — PATIENT INSTRUCTIONS
JOANNA 128 (2 OR 5%) - DROPS DURING DAY // OINTMENT AT NIGHT    REFRESH DROPS    DOXYCYCLINE - 1 PILL TWICE A DAY WITH FOOD AND WATER

## 2017-07-21 NOTE — LETTER
July 21, 2017      Kevin Albert MD  [address]  [facility] [facility] [place] [address]           [facility]  [address]  [facility] [address]  Phone: [phone]  Fax: [phone]          Patient: Raffy Rutherford Jr.   MR Number: [id number]   YOB: 1950   Date of Visit: 7/21/2017       Dear Dr. Kevin Albert:    Thank you for referring Raffy Rutherford to me for evaluation. Attached you will find relevant portions of my assessment and plan of care.    If you have questions, please do not hesitate to call me. I look forward to following Raffy Rutherford along with you.    Sincerely,    Melanie Sequeira MD    Enclosure  CC:  No Recipients    If you would like to receive this communication electronically, please contact [email or web address] or [phone] to request more information on [email or web address] Link access.    For providers and/or their staff who would like to refer a patient to [facility], please contact us through our one-stop-shop provider referral line, [facility], at [phone].    If you feel you have received this communication in error or would no longer like to receive these types of communications, please e-mail [email or web address]

## 2017-07-27 ENCOUNTER — CLINICAL SUPPORT (OUTPATIENT)
Dept: REHABILITATION | Facility: HOSPITAL | Age: 67
End: 2017-07-27
Attending: ORTHOPAEDIC SURGERY
Payer: COMMERCIAL

## 2017-07-27 DIAGNOSIS — M77.8 RIGHT WRIST TENDINITIS: Primary | ICD-10-CM

## 2017-07-27 DIAGNOSIS — M25.531 PAIN IN RIGHT WRIST: ICD-10-CM

## 2017-07-27 PROCEDURE — 97018 PARAFFIN BATH THERAPY: CPT

## 2017-07-27 PROCEDURE — 97165 OT EVAL LOW COMPLEX 30 MIN: CPT

## 2017-07-27 PROCEDURE — 97140 MANUAL THERAPY 1/> REGIONS: CPT

## 2017-07-28 PROBLEM — M77.8 RIGHT WRIST TENDINITIS: Status: ACTIVE | Noted: 2017-07-28

## 2017-07-28 PROBLEM — M25.531 PAIN IN RIGHT WRIST: Status: ACTIVE | Noted: 2017-07-28

## 2017-07-28 NOTE — PLAN OF CARE
Occupational Therapy -Hand / Wrist  Evaluation    Patient: Raffy Rutherford Jr.  Date of Evaluation: 2017  Referring Physician:  Dr. SHANNEN Diamond  Diagnosis: Right wrist tendinitis  1. Right wrist tendinitis     2. Pain in right wrist       MRN: [id number]    Referral Orders:   Eval and treat     Start Time: 4:15  End Time: 5:15  Total Time: 60 min     Hand dominance: Right    Occupation:  Counselor, Musician,   Working presently:  yes  Workmen's Compensation:  no    Date of onset: gradual onset since 3/2017  Involved area:  Right FCU and lat epicondyle  Mechanism of Injury:   Repetitive strain  Past Medical History/Physical Systems Review: Brief    Living status: Wife    Environmental Concerns/ Fall Risk:  None  Barriers to Learning: None   Cultural/Spiritual : None   Developmental/Education: None   Abuse/ Neglect: none   Nutritional Deficit: None   Language: None   Hearing/Vision Deficit: None   Other:     Subjective:  Pt reports I bought a new mouse instead of a touchpad and that may be contributing to my tendinitis.    Pain   At rest: 2 out of 10  With work/ Activity: 7 out of 10  Sleepin out of 10  Location of Pain : volar ulnar side of wrist to mid forearm and lat epicondyle    Patients goals for therapy are:  eliminate pain    Objective:   Observation: Pt tender with palpation of FCU at wrist and lateral epicondyle.    Sensation: intact  Scar / Wound: none  Edema:  none        Range of Motion:    WNL throughout right hand and wrist                                             :                                       Strength: (LOYDA Dynamometer in lbs.), Average 3 trials, Position II: Right Elbow flex 60, Elbow ex 53, Left Elbow flex 75, ext 74        Pinch Strength: (Pinch Gauge in psis), Average 3 trials:   Right Left  Key 14 12  3 pt 12 15  2 pt 9 10           Functional Limitations:   Self Care / ADL: Limited use of right hand for ADLs, grooming, hygiene due to increased  pain  Work/Activities: Limited use of right hand for typing  Leisure: Limited use of right hand for playing the guitar     Treatment Included:   OT evaluation and instruction modalities and and STM over FCU region. Patient received paraffin bath with moist heat to right hand/wrist for 15 minutes to increase blood flow, circulation, pain management and for tissue elasticity prior to STM. Custom fabricated wrist/hand orthosis with wrist flexed to 10 degrees to decrease tension on FCU. Kinesiotaped volar wrist ulnar side to elbow with 25% stretch with 80% correction over FCU region at wrist and kinesiotaped dorsal radial wrist to lat epicondyle with 25% and 80% correction over mobile wad. Pt verbalized good understanding of technique. Applied Tuibigrip D to hand/wrist for compression.      Patient demonstrates good understanding of HEP/modality use for pain management.    Assessment:   Problem List :         Decreased  and pinch strength   Decreased functional hand use   Increased pain       History Examination Decision Making Complexity Score   Occupational Profile:   Completed an brief chart review        Medical and Therapy History:   Comorbidities that may affect POC include: none          Score: low   Performance Deficits   Dominant UE affected; Few    Physical  Decreased  ROM, Strength, Coordination (GMC/FMC) and Activity Tolerance   which inhibit pt's [place] with ADL's, IADL's    Cognitive - NONE   Attention, Problem Solving, Memory, Sequencing, Insight into deficits are not impaired    Psychosocial:    Social Skills, Mood/Affect, Behavior are not impaired.     Score: low  Low analytic complexity, based on:  few treatment options available      Modifications :use touch pad and not mouse, alter wrist position to neutral when playing instrument is possible    Orthotic: yes        Score: low  Low complexity        Goals: ( 6 weeks)   1)  Patient to be IND with HEP and modalities for pain management  2)   Decrease pain to 2/10 with activity/work.  3)   Increase endurance with computer work/playing musical instrument without pain.  4) Pt will demonstrate and verbalize good understanding of modifications for  work/leisure activities.        Plan:   Patient to be treated by Occupational Therapy    1  time per week every other week  for   6-8                   weeks  during the certification period from   7/28/2017     to  9/28/2017   to achieve the established goals.  Treatment to include :  paraffin, fluidotherapy, manual therapy/joint mobilizations, modalities for pain management, US 3mhz, therapeutic exercises/activities,  strengthening,as well as any other treatments deemed necessary based on the patient's needs or progress.               I certify the need for these services furnished under this plan of treatment and while under my care    ____________________________________                         __________________  Physician/Referring Practitioner                                               Date of Signature

## 2017-08-07 ENCOUNTER — TELEPHONE (OUTPATIENT)
Dept: OPHTHALMOLOGY | Facility: CLINIC | Age: 67
End: 2017-08-07

## 2017-08-14 ENCOUNTER — TELEPHONE (OUTPATIENT)
Dept: OPHTHALMOLOGY | Facility: CLINIC | Age: 67
End: 2017-08-14

## 2017-08-15 ENCOUNTER — OFFICE VISIT (OUTPATIENT)
Dept: OPHTHALMOLOGY | Facility: CLINIC | Age: 67
End: 2017-08-15
Payer: COMMERCIAL

## 2017-08-15 DIAGNOSIS — H57.12 EYE PAIN, LEFT: ICD-10-CM

## 2017-08-15 DIAGNOSIS — H18.529 ANTERIOR BASEMENT MEMBRANE DYSTROPHY: Primary | ICD-10-CM

## 2017-08-15 PROCEDURE — 92014 COMPRE OPH EXAM EST PT 1/>: CPT | Mod: S$GLB,,, | Performed by: OPHTHALMOLOGY

## 2017-08-15 PROCEDURE — 99999 PR PBB SHADOW E&M-EST. PATIENT-LVL III: CPT | Mod: PBBFAC,,, | Performed by: OPHTHALMOLOGY

## 2017-08-16 NOTE — PROGRESS NOTES
HPI     Referred by Dr Terrence KOROMA OU  RES    Pt here for f/u eye pain OS.  states eye pain had subsided, but he is   starting to have eye pain again OS (6 on scale).    Refresh PRN OU   Lluvia 128 eyedrops during the day and ointment at night   Doxycycline 1 pill by mouth twice a day with food and water (pt stopped   taking it due to acidity in stomach)    Last edited by Melanie Sequeira MD on 8/16/2017 11:01 AM. (History)            Assessment /Plan     For exam results, see Encounter Report.    Anterior basement membrane dystrophy    Eye pain, left      ABMD/ RES OS  - discussed super K as pt notes NI with lluvia, does not tolerate doxy    Can call pt next wk and schedule accordingly.

## 2017-08-18 ENCOUNTER — PROCEDURE VISIT (OUTPATIENT)
Dept: OPHTHALMOLOGY | Facility: CLINIC | Age: 67
End: 2017-08-18
Payer: COMMERCIAL

## 2017-08-18 VITALS — SYSTOLIC BLOOD PRESSURE: 120 MMHG | DIASTOLIC BLOOD PRESSURE: 80 MMHG | HEART RATE: 58 BPM

## 2017-08-18 DIAGNOSIS — H18.529 ANTERIOR BASEMENT MEMBRANE DYSTROPHY: Primary | ICD-10-CM

## 2017-08-18 DIAGNOSIS — H57.12 EYE PAIN, LEFT: ICD-10-CM

## 2017-08-18 PROCEDURE — 92014 COMPRE OPH EXAM EST PT 1/>: CPT | Mod: 57,S$GLB,, | Performed by: OPHTHALMOLOGY

## 2017-08-18 PROCEDURE — 65400 REMOVAL OF EYE LESION: CPT | Mod: LT,S$GLB,, | Performed by: OPHTHALMOLOGY

## 2017-08-18 RX ORDER — HYDROCODONE BITARTRATE AND ACETAMINOPHEN 5; 325 MG/1; MG/1
1 TABLET ORAL 3 TIMES DAILY PRN
Qty: 10 TABLET | Refills: 0 | Status: SHIPPED | OUTPATIENT
Start: 2017-08-18 | End: 2017-09-17

## 2017-08-18 RX ORDER — OFLOXACIN 3 MG/ML
1 SOLUTION/ DROPS OPHTHALMIC 3 TIMES DAILY
Qty: 10 ML | Refills: 1 | Status: SHIPPED | OUTPATIENT
Start: 2017-08-18 | End: 2017-09-17

## 2017-08-18 RX ORDER — CLONAZEPAM 0.5 MG/1
TABLET ORAL
Qty: 180 TABLET | Refills: 3 | Status: SHIPPED | OUTPATIENT
Start: 2017-08-18 | End: 2018-02-02

## 2017-08-18 NOTE — PROGRESS NOTES
HPI     Referred by Dr Terrence KOROMA OU  RES    Pt here for superficial keratectomy OS.  Pt denies changes since last   visit OU.    Refresh PRN OU   Juliet 128 eyedrops during the day and ointment at night   Doxycycline 1 pill by mouth twice a day with food and water (pt stopped   taking it due to acidity in stomach)    Last edited by Yolanda Sen MA on 8/18/2017  3:16 PM.   (History)            Assessment /Plan     For exam results, see Encounter Report.    Anterior basement membrane dystrophy    Eye pain, left    Other orders  -     hydrocodone-acetaminophen 5-325mg (NORCO) 5-325 mg per tablet; Take 1 tablet by mouth 3 (three) times daily as needed for Pain.  Dispense: 10 tablet; Refill: 0  -     ofloxacin (OCUFLOX) 0.3 % ophthalmic solution; Place 1 drop into the left eye 3 (three) times daily.  Dispense: 10 mL; Refill: 1      SURGEON: Melanie Sequeira M.D.     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 After a thorough discussion of risks, benefits, and alternatives, it was agreed to proceed with surgical removal of corneal epithelium to attempt visual rehabilitation and improve activities of daily living which have suffered due to the impaired visual status.     PROCEDURE IN DETAIL   The patient was placed in a supine position on the procedure table while the eye was prepped and draped in standard sterile fashion with 5% Betadine. A lid speculum was placed and the procedure begun by debridement of the corneal epithelium with a Comal blade, which was also used to remove as much of the diseased cornea as could safely and easily be accomplished.     Antibiotic drops  were placed on the eye, and a bandage contact lens applied.     The patient will continue with antibiotic and anti-inflammatory treatment, and be followed up in the eye clinic in 1 week.       Va OS next.

## 2017-08-21 ENCOUNTER — CLINICAL SUPPORT (OUTPATIENT)
Dept: REHABILITATION | Facility: HOSPITAL | Age: 67
End: 2017-08-21
Attending: ORTHOPAEDIC SURGERY
Payer: COMMERCIAL

## 2017-08-21 DIAGNOSIS — M77.8 RIGHT WRIST TENDINITIS: Primary | ICD-10-CM

## 2017-08-21 DIAGNOSIS — M25.531 PAIN IN RIGHT WRIST: ICD-10-CM

## 2017-08-21 PROCEDURE — 97140 MANUAL THERAPY 1/> REGIONS: CPT

## 2017-08-21 PROCEDURE — 97022 WHIRLPOOL THERAPY: CPT

## 2017-08-21 PROCEDURE — 97035 APP MDLTY 1+ULTRASOUND EA 15: CPT

## 2017-08-21 NOTE — PROGRESS NOTES
"OT Daily Progress Note    Patient:  aRffy Rutherford Jr.  Clinic #:  [id number]   Date of Note: 08/21/2017   Referring Physician:  Cici Carvajal, *  Diagnosis:  Right wrist tendinitis  Encounter Diagnoses   Name Primary?    Right wrist tendinitis Yes    Pain in right wrist         Visit 2   FOTO     Start Time: 9:00  End Time: 9:45  Total Time: 45 min  Group Time: 0      Subjective:   "My wrist and elbow are doing better."  Pain: 2 out of 10     Objective:   Patient seen by OT this session. Treatment  consist of the following: Patient received fluidotherapy to right hand(s) for 15 minutes to increase blood flow, circulation, desensitization, sensory re-education and for pain management.   Performed US 3 mhz 0.5 w/cm2 x 8 min x 100% to volar wrist and dorsal wrist to increase blood flow, circulation, and  tissue elasticity. Pt brought in his duong guitar and was instructed to keep his wrist in a neutral position as much as possible. Pt demonstrated and verbalized good understanding of this technique. Pt instructed to wear orthotic only during the day, not at night and not while playing the guitar. Pt then received STM to right wrist volar and dorsal regions. Kinesiotaped volar wrist ulnar side to elbow with 25% stretch with 80% correction over FCU region at wrist and kinesiotaped dorsal radial wrist to lat epicondyle with 25% and 80% correction over mobile wad.       Treatment: Ultrasound x 12 min, Fluidotherapy x 15 min and Manual therapy x 10 min     Assessment:  Pt will continue to benefit from skilled OT intervention.   Patient is making good progress toward established goals.   Patient continues to demonstrate limitation  with  Decreased functional use, Decreased strength and Continued pain    Goals: ( 6 weeks)   1)  Patient to be IND with HEP and modalities for pain management  2)  Decrease pain to 2/10 with activity/work.  3)   Increase endurance with computer work/playing musical instrument without " pain.  4) Pt will demonstrate and verbalize good understanding of modifications for  work/leisure activities.         Plan:   Patient to be treated by Occupational Therapy    1  time per week every other week  for   6-8                   weeks  during the certification period from   7/28/2017     to  9/28/2017   to achieve the established goals.

## 2017-08-29 ENCOUNTER — OFFICE VISIT (OUTPATIENT)
Dept: OPHTHALMOLOGY | Facility: CLINIC | Age: 67
End: 2017-08-29
Payer: COMMERCIAL

## 2017-08-29 DIAGNOSIS — H57.12 EYE PAIN, LEFT: ICD-10-CM

## 2017-08-29 DIAGNOSIS — H18.529 ANTERIOR BASEMENT MEMBRANE DYSTROPHY: Primary | ICD-10-CM

## 2017-08-29 PROCEDURE — 99024 POSTOP FOLLOW-UP VISIT: CPT | Mod: S$GLB,,, | Performed by: OPHTHALMOLOGY

## 2017-08-29 PROCEDURE — 99999 PR PBB SHADOW E&M-EST. PATIENT-LVL III: CPT | Mod: PBBFAC,,, | Performed by: OPHTHALMOLOGY

## 2017-08-29 NOTE — PROGRESS NOTES
HPI     Referred by Dr Ocampo     S/p superficial keratectomy OS 8/18/17  - for RES    ABMD OU  RES    Pt here for post op check OS.  Pt states doing well. Pt denies pain OS.    Pt states he still has BCL OS.  Pt states slight blurry VA OS.    Refresh PRN OU   Ocuflox TID OS    Last edited by Melanie Sequeira MD on 8/29/2017 11:55 AM. (History)            Assessment /Plan     For exam results, see Encounter Report.    Anterior basement membrane dystrophy    Eye pain, left      S/p superficial keratectomy OS 8/18/17  - for RES  - doing well, VA improved and no episodes of pain    Cont BCL x 3 wks - incr freq of PF ATs. Cont ocuflox BID    F/up 3 wks - VA OS, GK to remove BCL

## 2017-08-30 ENCOUNTER — CLINICAL SUPPORT (OUTPATIENT)
Dept: REHABILITATION | Facility: HOSPITAL | Age: 67
End: 2017-08-30
Attending: ORTHOPAEDIC SURGERY
Payer: COMMERCIAL

## 2017-08-30 DIAGNOSIS — M25.531 PAIN IN RIGHT WRIST: ICD-10-CM

## 2017-08-30 DIAGNOSIS — M77.8 RIGHT WRIST TENDINITIS: Primary | ICD-10-CM

## 2017-08-30 PROCEDURE — 97035 APP MDLTY 1+ULTRASOUND EA 15: CPT

## 2017-08-30 PROCEDURE — 97140 MANUAL THERAPY 1/> REGIONS: CPT

## 2017-08-30 PROCEDURE — 97022 WHIRLPOOL THERAPY: CPT

## 2017-08-30 NOTE — PROGRESS NOTES
OT Daily Progress Note    Patient:  Raffy Rutherford Jr.  Clinic #:  [id number]   Date of Note: 08/30/2017   Referring Physician:  Cici Carvajal, *  Diagnosis:  Right wrist tendinitis  Encounter Diagnoses   Name Primary?    Right wrist tendinitis Yes    Pain in right wrist         Visit 3   FOTO     Start Time: 10:20  End Time: 11:10  Total Time: 50 min  Group Time: 0      Subjective: Pt reports that his pain is now more on the volar and dorsal ulnar side of his hand.    Pain: 2 out of 10     Objective:   Patient seen by OT this session. Treatment  consist of the following: Patient received fluidotherapy to right hand(s) for 15 minutes to increase blood flow, circulation, desensitization, sensory re-education and for pain management.   Performed US 3 mhz 0.5 w/cm2 x 8 min x 100% to volar wrist and dorsal wrist to increase blood flow, circulation, and  tissue elasticity.  Pt then received STM and IASTYM to right wrist volar and dorsal regions. Kinesiotaped volar wrist ulnar side to elbow with 25% stretch with 80% correction over FCU region at wrist and kinesiotaped dorsal radial wrist to lat epicondyle with 25% and 80% correction over mobile wad. Adjusted orthotic to neutral position and instructed pt to play guitar with orthotic on.      Treatment: Ultrasound x 8 min, Fluidotherapy x 15 min and Manual therapy x 20 min     Assessment:  Pt will continue to benefit from skilled OT intervention.   Patient is making good progress toward established goals.   Patient continues to demonstrate limitation  with  Decreased functional use, Decreased strength and Continued pain    Goals: ( 6 weeks)   1)  Patient to be IND with HEP and modalities for pain management  2)  Decrease pain to 2/10 with activity/work.  3)   Increase endurance with computer work/playing musical instrument without pain.  4) Pt will demonstrate and verbalize good understanding of modifications for  work/leisure activities.         Plan:   Patient to  be treated by Occupational Therapy    1  time per week every other week  for   6-8                   weeks  during the certification period from   7/28/2017     to  9/28/2017   to achieve the established goals.

## 2017-09-06 ENCOUNTER — CLINICAL SUPPORT (OUTPATIENT)
Dept: REHABILITATION | Facility: HOSPITAL | Age: 67
End: 2017-09-06
Attending: ORTHOPAEDIC SURGERY
Payer: COMMERCIAL

## 2017-09-06 DIAGNOSIS — M77.8 RIGHT WRIST TENDINITIS: Primary | ICD-10-CM

## 2017-09-06 DIAGNOSIS — M25.531 PAIN IN RIGHT WRIST: ICD-10-CM

## 2017-09-06 PROCEDURE — 97140 MANUAL THERAPY 1/> REGIONS: CPT

## 2017-09-06 PROCEDURE — 97022 WHIRLPOOL THERAPY: CPT

## 2017-09-06 PROCEDURE — 97035 APP MDLTY 1+ULTRASOUND EA 15: CPT

## 2017-09-06 NOTE — PROGRESS NOTES
OT Daily Progress Note    Patient:  Raffy Rutherford Jr.  Clinic #:  [id number]   Date of Note: 09/06/2017   Referring Physician:  Cici Carvajal, *  Diagnosis:  Right wrist tendinitis  Encounter Diagnoses   Name Primary?    Right wrist tendinitis Yes    Pain in right wrist         Visit 4   FOTO     Start Time: 9:10  End Time: 9:55  Total Time: 45 min  Group Time: 0      Subjective: Pt reports that his pain is now more on the volar and dorsal ulnar side of his hand.    Pain: 2 out of 10     Objective:   Patient seen by OT this session. Treatment  consist of the following: Patient received fluidotherapy to right hand(s) for 15 minutes to increase blood flow, circulation, desensitization, sensory re-education and for pain management.   Performed US 3 mhz 0.5 w/cm2 x 8 min x 100% to volar wrist and dorsal wrist to increase blood flow, circulation, and  tissue elasticity.  Pt then received STM and IASTYM to right wrist volar and dorsal regions. Kinesiotaped volar wrist ulnar side to elbow with 25% stretch with 80% correction over FCU region at wrist and kinesiotaped dorsal radial wrist to lat epicondyle with 25% and 80% correction over mobile wad.      Treatment: Ultrasound x 8 min, Fluidotherapy x 15 min and Manual therapy x 20 min     Assessment:  Pt will continue to benefit from skilled OT intervention.   Patient is making good progress toward established goals.   Patient continues to demonstrate limitation  with  Decreased functional use, Decreased strength and Continued pain    Goals: ( 6 weeks)   1)  Patient to be IND with HEP and modalities for pain management  2)  Decrease pain to 2/10 with activity/work.  3)   Increase endurance with computer work/playing musical instrument without pain.  4) Pt will demonstrate and verbalize good understanding of modifications for  work/leisure activities.         Plan:   Patient to be treated by Occupational Therapy    1  time per week every other week  for   6-8                    weeks  during the certification period from   7/28/2017     to  9/28/2017   to achieve the established goals.

## 2017-09-12 ENCOUNTER — OFFICE VISIT (OUTPATIENT)
Dept: ORTHOPEDICS | Facility: CLINIC | Age: 67
End: 2017-09-12
Payer: COMMERCIAL

## 2017-09-12 VITALS
SYSTOLIC BLOOD PRESSURE: 117 MMHG | BODY MASS INDEX: 26.81 KG/M2 | HEART RATE: 61 BPM | RESPIRATION RATE: 18 BRPM | HEIGHT: 69 IN | DIASTOLIC BLOOD PRESSURE: 73 MMHG | WEIGHT: 181 LBS

## 2017-09-12 DIAGNOSIS — M25.531 WRIST PAIN, ACUTE, RIGHT: Primary | ICD-10-CM

## 2017-09-12 PROCEDURE — 99999 PR PBB SHADOW E&M-EST. PATIENT-LVL III: CPT | Mod: PBBFAC,,, | Performed by: ORTHOPAEDIC SURGERY

## 2017-09-12 PROCEDURE — 3008F BODY MASS INDEX DOCD: CPT | Mod: S$GLB,,, | Performed by: ORTHOPAEDIC SURGERY

## 2017-09-12 PROCEDURE — 20606 DRAIN/INJ JOINT/BURSA W/US: CPT | Mod: RT,S$GLB,, | Performed by: ORTHOPAEDIC SURGERY

## 2017-09-12 PROCEDURE — 99213 OFFICE O/P EST LOW 20 MIN: CPT | Mod: 25,S$GLB,, | Performed by: ORTHOPAEDIC SURGERY

## 2017-09-12 RX ADMIN — DEXAMETHASONE SODIUM PHOSPHATE 4 MG: 4 INJECTION, SOLUTION INTRA-ARTICULAR; INTRALESIONAL; INTRAMUSCULAR; INTRAVENOUS; SOFT TISSUE at 09:09

## 2017-09-12 NOTE — PROCEDURES
Intermediate Joint Aspiration/Injection  Date/Time: 9/12/2017 9:51 AM  Performed by: WANG GUTIERREZ  Authorized by: WANG GUTIERREZ     Consent Done?:  Yes (Verbal)  Indications:  Pain  Timeout: Prior to procedure the correct patient, procedure, and site was verified      Location:  Wrist  Site:  R radiocarpal  Ultrasonic Guidance for needle placement: Yes  Images are saved and documented.  Needle size:  25 G  Medications:  4 mg dexamethasone 4 mg/mL

## 2017-09-14 ENCOUNTER — CLINICAL SUPPORT (OUTPATIENT)
Dept: REHABILITATION | Facility: HOSPITAL | Age: 67
End: 2017-09-14
Attending: ORTHOPAEDIC SURGERY
Payer: COMMERCIAL

## 2017-09-14 DIAGNOSIS — M77.8 RIGHT WRIST TENDINITIS: Primary | ICD-10-CM

## 2017-09-14 DIAGNOSIS — M25.531 PAIN IN RIGHT WRIST: ICD-10-CM

## 2017-09-14 PROCEDURE — 97022 WHIRLPOOL THERAPY: CPT

## 2017-09-14 PROCEDURE — 97140 MANUAL THERAPY 1/> REGIONS: CPT

## 2017-09-14 PROCEDURE — 97035 APP MDLTY 1+ULTRASOUND EA 15: CPT

## 2017-09-14 NOTE — PROGRESS NOTES
OT Daily Progress Note    Patient:  Raffy Rutherford Jr.  Clinic #:  [id number]   Date of Note: 09/14/2017   Referring Physician:  Cici Carvajal, *  Diagnosis:  Right wrist tendinitis  Encounter Diagnoses   Name Primary?    Right wrist tendinitis Yes    Pain in right wrist         Visit 5  FOTO     Start Time: 9:30  End Time: 10:15  Total Time: 45 min  Group Time: 0      Subjective: Pt reports that his pain is now more on the volar ulna and dorsal radial side of his hand. Pt reported that he picked up a heavy briefcase and his hand began to hurt right after. Pt reports that his hand pain was down until he lifted the briefcase.    Pain: 5 out of 10     Objective:   Patient seen by OT this session. Treatment  consist of the following: Patient received fluidotherapy to right hand(s) for 15 minutes to increase blood flow, circulation, desensitization, sensory re-education and for pain management.   Performed US 3 mhz 0.5 w/cm2 x 8 min x 100% to volar wrist and dorsal wrist to increase blood flow, circulation, and  tissue elasticity.  Pt then received STM and IASTYM to right wrist volar and dorsal regions. Kinesiotaped volar wrist ulnar side to elbow with 25% stretch with 80% correction over FCU region at wrist and kinesiotaped dorsal radial wrist to lat epicondyle with 25% and 80% correction over mobile wad. Pt instructed to avoid lifting with his right hand until the pain subsides. Pt also instructed to wean from orthotic wear.      Treatment: Ultrasound x 8 min, Fluidotherapy x 15 min and Manual therapy x 20 min     Assessment:   Pain has increased in right hand/wrist due to lifting.  Pt will continue to benefit from skilled OT intervention.   Patient is making good progress toward established goals.   Patient continues to demonstrate limitation  with  Decreased functional use, Decreased strength and Continued pain    Goals: ( 6 weeks)   1)  Patient to be IND with HEP and modalities for pain management  2)   Decrease pain to 2/10 with activity/work.  3)   Increase endurance with computer work/playing musical instrument without pain.  4) Pt will demonstrate and verbalize good understanding of modifications for  work/leisure activities.         Plan:   Patient to be treated by Occupational Therapy    1  time per week every other week  for   6-8                   weeks  during the certification period from   7/28/2017     to  9/28/2017   to achieve the established goals.

## 2017-09-18 ENCOUNTER — CLINICAL SUPPORT (OUTPATIENT)
Dept: REHABILITATION | Facility: HOSPITAL | Age: 67
End: 2017-09-18
Attending: ORTHOPAEDIC SURGERY
Payer: COMMERCIAL

## 2017-09-18 DIAGNOSIS — M77.8 RIGHT WRIST TENDINITIS: Primary | ICD-10-CM

## 2017-09-18 DIAGNOSIS — M25.531 PAIN IN RIGHT WRIST: ICD-10-CM

## 2017-09-18 PROCEDURE — 97022 WHIRLPOOL THERAPY: CPT

## 2017-09-18 PROCEDURE — 97035 APP MDLTY 1+ULTRASOUND EA 15: CPT

## 2017-09-18 PROCEDURE — 97140 MANUAL THERAPY 1/> REGIONS: CPT

## 2017-09-18 RX ORDER — DEXAMETHASONE SODIUM PHOSPHATE 4 MG/ML
4 INJECTION, SOLUTION INTRA-ARTICULAR; INTRALESIONAL; INTRAMUSCULAR; INTRAVENOUS; SOFT TISSUE
Status: DISCONTINUED | OUTPATIENT
Start: 2017-09-12 | End: 2017-09-18 | Stop reason: HOSPADM

## 2017-09-18 NOTE — PROGRESS NOTES
OT Daily Progress Note    Patient:  Raffy Rutherford Jr.  Clinic #:  [id number]   Date of Note: 09/18/2017   Referring Physician:  Cici Carvajal, *  Diagnosis:  Right wrist tendinitis  Encounter Diagnoses   Name Primary?    Right wrist tendinitis Yes    Pain in right wrist         Visit 6  FOTO     Start Time: 3:30  End Time: 4:15  Total Time: 45 min  Group Time: 0      Subjective: Pt reports that his pain is on dorsum of right index metacarpal. Pt is no longer wearing his brace.    Pain: 5 out of 10     Objective:   Patient seen by OT this session. Treatment  consist of the following: Patient received fluidotherapy to right hand(s) for 15 minutes to increase blood flow, circulation, desensitization, sensory re-education and for pain management.   Performed US 3 mhz 0.5 w/cm2 x 8 min x 100% to volar wrist and dorsal wrist to increase blood flow, circulation, and  tissue elasticity.  Pt then received STM and IASTYM to right wrist volar and dorsal regions. Kinesiotaped volar wrist ulnar side to elbow with 25% stretch with 80% correction over FCU region at wrist and kinesiotaped dorsal radial wrist to lat epicondyle with 25% and 80% correction over mobile wad. Pt instructed to avoid lifting with his right hand until the pain subsides.       Treatment: Ultrasound x 8 min, Fluidotherapy x 15 min and Manual therapy x 20 min     Assessment:   Pain has increased in right hand/wrist due to lifting.  Pt will continue to benefit from skilled OT intervention.   Patient is making good progress toward established goals.   Patient continues to demonstrate limitation  with  Decreased functional use, Decreased strength and Continued pain    Goals: ( 6 weeks)   1)  Patient to be IND with HEP and modalities for pain management  2)  Decrease pain to 2/10 with activity/work.  3)   Increase endurance with computer work/playing musical instrument without pain.  4) Pt will demonstrate and verbalize good understanding of modifications  for  work/leisure activities.         Plan: Advance strengthening next visit.  Patient to be treated by Occupational Therapy    1 -2 times  week  for   6-8                   weeks  during the certification period from   7/28/2017     to  9/28/2017   to achieve the established goals.

## 2017-09-19 ENCOUNTER — OFFICE VISIT (OUTPATIENT)
Dept: OPHTHALMOLOGY | Facility: CLINIC | Age: 67
End: 2017-09-19
Payer: COMMERCIAL

## 2017-09-19 DIAGNOSIS — H57.12 EYE PAIN, LEFT: ICD-10-CM

## 2017-09-19 DIAGNOSIS — H18.529 ANTERIOR BASEMENT MEMBRANE DYSTROPHY: Primary | ICD-10-CM

## 2017-09-19 PROCEDURE — 99999 PR PBB SHADOW E&M-EST. PATIENT-LVL III: CPT | Mod: PBBFAC,,, | Performed by: OPHTHALMOLOGY

## 2017-09-19 PROCEDURE — 92012 INTRM OPH EXAM EST PATIENT: CPT | Mod: 24,S$GLB,, | Performed by: OPHTHALMOLOGY

## 2017-09-19 NOTE — PROGRESS NOTES
HPI     Referred by Dr Ocampo     S/p superficial keratectomy OS 8/18/17  - for RES    ABMD OU  RES    Pt here for post op check OS.  Pt states doing well. Pt denies pain OS.    Pt states he still has BCL OS.  Pt states slight blurry VA OS.    Refresh PRN OU   Ocuflox TID OS    HPI  OS VA per  BCL to be removed OS per . Pt reports Pain   OS level 5.    Last edited by Lily Guy, PCT on 9/19/2017  3:07 PM. (History)            Assessment /Plan     For exam results, see Encounter Report.    Anterior basement membrane dystrophy    Eye pain, left      S/p superficial keratectomy OS 8/18/17  - for RES    BCL removed today, epi healed.    Okay to d/c abx, cont ATs, gel QHS    F/up dr. Ocampo 3 mo, sooner PRN

## 2017-09-20 ENCOUNTER — CLINICAL SUPPORT (OUTPATIENT)
Dept: REHABILITATION | Facility: HOSPITAL | Age: 67
End: 2017-09-20
Attending: ORTHOPAEDIC SURGERY
Payer: COMMERCIAL

## 2017-09-20 DIAGNOSIS — M77.8 RIGHT WRIST TENDINITIS: Primary | ICD-10-CM

## 2017-09-20 DIAGNOSIS — M25.531 PAIN IN RIGHT WRIST: ICD-10-CM

## 2017-09-20 PROCEDURE — 97110 THERAPEUTIC EXERCISES: CPT

## 2017-09-20 PROCEDURE — 97140 MANUAL THERAPY 1/> REGIONS: CPT

## 2017-09-20 PROCEDURE — 97035 APP MDLTY 1+ULTRASOUND EA 15: CPT

## 2017-09-20 PROCEDURE — 97022 WHIRLPOOL THERAPY: CPT

## 2017-09-20 NOTE — PROGRESS NOTES
OT Daily Progress Note    Patient:  Raffy Rutherford Jr.  Clinic #:  [id number]   Date of Note: 09/20/2017   Referring Physician:  Cici Carvajal, *  Diagnosis:  Right wrist tendinitis  Encounter Diagnoses   Name Primary?    Right wrist tendinitis Yes    Pain in right wrist         Visit 7  FOTO     Start Time: 10:00  End Time: 11:00  Total Time: 60 min  Group Time: 0      Subjective: Pt reports that he is pain free at times.    Pain: 2-3 out of 10     Objective:   Patient seen by OT this session. Treatment  consist of the following: Patient received fluidotherapy to right hand(s) for 15 minutes to increase blood flow, circulation, desensitization, sensory re-education and for pain management.   Performed US 3 mhz 0.5 w/cm2 x 8 min x 100% to volar wrist and dorsal wrist to increase blood flow, circulation, and  tissue elasticity.  Pt then received STM and IASTYM to right wrist volar and dorsal regions. Kinesiotaped volar wrist ulnar side to elbow with 25% stretch with 80% correction over FCU region at wrist and kinesiotaped dorsal radial wrist to lat epicondyle with 25% and 80% correction over mobile wad. Pt issued yellow putty for HEP 2' molding, 1' grasping and 1 lb wrist flex/ext 1 x 15 reps.      Treatment: Ultrasound x 8 min, Fluidotherapy x 15 min and Manual therapy x 20 min, Therex 15 min     Assessment:   Decreased pain noted. Advance strengthening ex.  Pt will continue to benefit from skilled OT intervention.   Patient is making good progress toward established goals.   Patient continues to demonstrate limitation  with  Decreased functional use, Decreased strength and Continued pain    Goals: ( 6 weeks)   1)  Patient to be IND with HEP and modalities for pain management  2)  Decrease pain to 2/10 with activity/work.  3)   Increase endurance with computer work/playing musical instrument without pain.  4) Pt will demonstrate and verbalize good understanding of modifications for  work/leisure activities.          Plan: Advance strengthening next visit.  Patient to be treated by Occupational Therapy    1 -2 times  week  for   6-8                   weeks  during the certification period from   7/28/2017     to  9/28/2017   to achieve the established goals.

## 2017-10-02 ENCOUNTER — CLINICAL SUPPORT (OUTPATIENT)
Dept: REHABILITATION | Facility: HOSPITAL | Age: 67
End: 2017-10-02
Attending: ORTHOPAEDIC SURGERY
Payer: COMMERCIAL

## 2017-10-02 DIAGNOSIS — M25.531 PAIN IN RIGHT WRIST: Primary | ICD-10-CM

## 2017-10-02 DIAGNOSIS — M77.8 RIGHT WRIST TENDINITIS: ICD-10-CM

## 2017-10-02 PROCEDURE — 97140 MANUAL THERAPY 1/> REGIONS: CPT

## 2017-10-02 PROCEDURE — 97035 APP MDLTY 1+ULTRASOUND EA 15: CPT

## 2017-10-02 PROCEDURE — 97022 WHIRLPOOL THERAPY: CPT

## 2017-10-02 PROCEDURE — 97110 THERAPEUTIC EXERCISES: CPT

## 2017-10-02 NOTE — PROGRESS NOTES
OT Daily Progress Note    Patient:  Raffy Rutherford Jr.  Clinic #:  [id number]   Date of Note: 10/02/2017   Referring Physician:  Cici Carvajal, *  Diagnosis:  Right wrist tendinitis  Encounter Diagnoses   Name Primary?    Pain in right wrist Yes    Right wrist tendinitis         Visit 8  FOTO     Start Time: 4:30  End Time: 5:25  Total Time: 55 min  Group Time: 0      Subjective: Pt reports that he cannot perform strengthening ex today due to time constraints.    Pain: 2-3 out of 10     Objective:   Patient seen by OT this session. Treatment  consist of the following: Patient received fluidotherapy to right hand(s) for 15 minutes to increase blood flow, circulation, desensitization, sensory re-education and for pain management.   Performed US 3 mhz 0.5 w/cm2 x 8 min x 100% to volar wrist and dorsal wrist to increase blood flow, circulation, and  tissue elasticity.  Pt then received STM and IASTYM to right wrist volar and dorsal regions. Kinesiotaped volar wrist ulnar side to elbow with 25% stretch with 80% correction over FCU region at wrist and kinesiotaped dorsal radial wrist to lat epicondyle with 25% stretch.       Treatment: Ultrasound x 8 min, Fluidotherapy x 15 min and Manual therapy x 20 min, Therex 10 min     Assessment:   Pt tolerated treatment with no c/o pain.  Pt will continue to benefit from skilled OT intervention.   Patient is making good progress toward established goals.   Patient continues to demonstrate limitation  with  Decreased functional use, Decreased strength and Continued pain    Goals: ( 6 weeks)   1)  Patient to be IND with HEP and modalities for pain management  2)  Decrease pain to 2/10 with activity/work.  3)   Increase endurance with computer work/playing musical instrument without pain.  4) Pt will demonstrate and verbalize good understanding of modifications for  work/leisure activities.         Plan: Advance strengthening next visit.  Patient to be treated by Occupational  Therapy    1 -2 times  week  for   6-8                   weeks  during the certification period from   7/28/2017     to  9/28/2017   to achieve the established goals.

## 2017-10-11 ENCOUNTER — CLINICAL SUPPORT (OUTPATIENT)
Dept: REHABILITATION | Facility: HOSPITAL | Age: 67
End: 2017-10-11
Attending: ORTHOPAEDIC SURGERY
Payer: COMMERCIAL

## 2017-10-11 DIAGNOSIS — M25.531 PAIN IN RIGHT WRIST: Primary | ICD-10-CM

## 2017-10-11 DIAGNOSIS — M77.8 RIGHT WRIST TENDINITIS: ICD-10-CM

## 2017-10-11 PROCEDURE — 97018 PARAFFIN BATH THERAPY: CPT

## 2017-10-11 PROCEDURE — 97140 MANUAL THERAPY 1/> REGIONS: CPT

## 2017-10-11 PROCEDURE — 97110 THERAPEUTIC EXERCISES: CPT

## 2017-10-11 NOTE — PROGRESS NOTES
Refer to POC  OT  Re-evaluation Note    Patient:  Raffy Rutherford Jr.  Clinic #:  [id number]   Date of Note: 10/11/2017   Referring Physician:  Cici Carvajal, *  Diagnosis:  Right wrist tendinitis  Encounter Diagnoses   Name Primary?    Pain in right wrist Yes    Right wrist tendinitis         Visit 9  FOTO     Start Time: 10:00  End Time: 11:00  Total Time: 60 min  Group Time: 0      Subjective: Pt reports his arm is feeling much better and has only occasional pain in his forearm.    Pain: 2 out of 10     Objective: Re-assessed 10/11/2017    AROM: WNL in right hand, wrist and forearm  Edema: none noted  Sensation: intact     Strength: (LOYDA Dynamometer in lbs.), Average 3 trials, Position II: Right Elbow flex 75 (+15), Elbow ex 75 (+22)  Left Elbow flex 75, ext 74                    Pinch Strength: (Pinch Gauge in psis), Average 3 trials:              Right     Left  Key      14 (n/c)     12  3 pt      13 (+1)        15  2 pt      9    (n/c)       10       Patient seen by OT this session. Treatment  Consisted of the following: Patient received paraffin with moist heat to right hand  for 15 minutes to increase blood flow, circulation, desensitization, sensory re-education and for pain management.     Pt then received STM and IASTYM to right wrist volar and dorsal regions. Pt then performed wrist ext/flex stretches with elbow extended 1 x 10 reps. Pt issued yellow putty with written HEP to be performed 1 x daily and performed the followin' molding, 2' grasping, finger ext x 10 reps, 3 logs ea key and 3 pt. Kinesiotaped   dorsal radial wrist to lat epicondyle with 25% stretch. Pt instructed to begin playing the base guitar again starting in small increments.    Treatment: Paraffin x 15 min and Manual therapy x 20 min, Therex 25 min     Assessment:   Pt tolerated treatment with no c/o pain. Pt made significant progress with  strength and decreasing pain complaints with use. Advanced strengthening  ex.  Pt will continue to benefit from skilled OT intervention.   Patient is making good progress toward established goals.   Patient continues to demonstrate limitation  with  Decreased functional use, Decreased strength and Continued pain    Goals: ( 6 weeks)   1)  Patient to be IND with HEP and modalities for pain management - met  2)  Decrease pain to 2/10 with activity/work.   3)   Increase endurance with computer work/playing musical instrument without pain.  4) Pt will demonstrate and verbalize good understanding of modifications for  work/leisure activities. - met        Plan: Advance strengthening next visit.  Patient to be treated by Occupational Therapy    1 -2 times  week  for   6-8                   weeks  during the certification period from   9/28/2017     to  11/28/2017   to achieve the established goals.

## 2017-10-11 NOTE — PLAN OF CARE
OT  Re-evaluation Note    Patient:  Raffy Rutherford Jr.  Clinic #:  [id number]   Date of Note: 10/11/2017   Referring Physician:  Cici Carvajal, *  Diagnosis:  Right wrist tendinitis  Encounter Diagnoses   Name Primary?    Pain in right wrist Yes    Right wrist tendinitis         Visit 9  FOTO     Start Time: 10:00  End Time: 11:00  Total Time: 60 min  Group Time: 0      Subjective: Pt reports his arm is feeling much better and has only occasional pain in his forearm.    Pain: 2 out of 10     Objective: Re-assessed 10/11/2017    AROM: WNL in right hand, wrist and forearm  Edema: none noted  Sensation: intact     Strength: (LOYDA Dynamometer in lbs.), Average 3 trials, Position II: Right Elbow flex 75 (+15), Elbow ex 75 (+22)  Left Elbow flex 75, ext 74                    Pinch Strength: (Pinch Gauge in psis), Average 3 trials:              Right     Left  Key      14 (n/c)     12  3 pt      13 (+1)        15  2 pt      9    (n/c)       10       Patient seen by OT this session. Treatment  Consisted of the following: Patient received paraffin with moist heat to right hand  for 15 minutes to increase blood flow, circulation, desensitization, sensory re-education and for pain management.     Pt then received STM and IASTYM to right wrist volar and dorsal regions. Pt then performed wrist ext/flex stretches with elbow extended 1 x 10 reps. Pt issued yellow putty with written HEP to be performed 1 x daily and performed the followin' molding, 2' grasping, finger ext x 10 reps, 3 logs ea key and 3 pt. Kinesiotaped   dorsal radial wrist to lat epicondyle with 25% stretch. Pt instructed to begin playing the base guitar again starting in small increments.    Treatment: Paraffin x 15 min and Manual therapy x 20 min, Therex 25 min     Assessment:   Pt tolerated treatment with no c/o pain. Pt made significant progress with  strength and decreasing pain complaints with use. Advanced strengthening ex.  Pt will  continue to benefit from skilled OT intervention.   Patient is making good progress toward established goals.   Patient continues to demonstrate limitation  with  Decreased functional use, Decreased strength and Continued pain    Goals: ( 6 weeks)   1)  Patient to be IND with HEP and modalities for pain management - met  2)  Decrease pain to 2/10 with activity/work.   3)   Increase endurance with computer work/playing musical instrument without pain.  4) Pt will demonstrate and verbalize good understanding of modifications for  work/leisure activities. - met        Plan: Advance strengthening next visit.  Patient to be treated by Occupational Therapy    1 -2 times  week  for   6-8                   weeks  during the certification period from   9/28/2017     to  11/28/2017   to achieve the established goals.

## 2017-10-13 ENCOUNTER — CLINICAL SUPPORT (OUTPATIENT)
Dept: REHABILITATION | Facility: HOSPITAL | Age: 67
End: 2017-10-13
Attending: ORTHOPAEDIC SURGERY
Payer: COMMERCIAL

## 2017-10-13 DIAGNOSIS — M25.531 PAIN IN RIGHT WRIST: Primary | ICD-10-CM

## 2017-10-13 PROCEDURE — G8985 CARRY GOAL STATUS: HCPCS | Mod: CI

## 2017-10-13 PROCEDURE — 97018 PARAFFIN BATH THERAPY: CPT | Mod: 59

## 2017-10-13 PROCEDURE — 97140 MANUAL THERAPY 1/> REGIONS: CPT

## 2017-10-13 PROCEDURE — 97110 THERAPEUTIC EXERCISES: CPT

## 2017-10-13 PROCEDURE — G8984 CARRY CURRENT STATUS: HCPCS | Mod: CJ

## 2017-10-13 NOTE — PROGRESS NOTES
Refer to POC  OT  Re-evaluation Note    Patient:  Raffy Rutherford Jr.  Clinic #:  [id number]   Date of Note: 10/13/2017   Referring Physician:  Cici Carvajal, *  Diagnosis:  Right wrist tendinitis  Encounter Diagnosis   Name Primary?    Pain in right wrist Yes        Visit 10       Start Time: 10:00  End Time: 11:00  Total Time: 60 min  Group Time: 0      Subjective:. I played the base for 15 min but then I started hurting. I'm depressed about my situation.    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 Pt agreed that his depression probably contributes to his pain.      Patient seen by OT this session. Treatment  Consisted of the following: Patient received paraffin with moist heat to right hand  for 15 minutes to increase blood flow, circulation, desensitization, sensory re-education and for pain management.     Pt then received STM and IASTYM to right wrist volar and dorsal regions. Pt then performed wrist ext/flex stretches with elbow extended 1 x 10 reps. Pt performed  yellow putty with written HEP to be performed 1 x daily and performed the followin' molding, 2' grasping, finger ext x 10 reps, 3 logs ea key and 3 pt. Kinesiotaped   dorsal radial wrist to lat epicondyle amd volar ulnar aspect of wrist to medial epicondyle with 25% stretch and 100% correction over ulnar side of wrist.. Pt instructed to continue  playing his instrument in small increments.    Treatment: Paraffin x 15 min and Manual therapy x 20 min, Therex 25 min     Assessment:   Pt tolerated treatment with no c/o pain. Pt encouraged to continue playing his instrument in small increments. Pt verbalized good understanding.  Pt will continue to benefit from skilled OT intervention.   Patient is making good progress toward established goals.   Patient continues to demonstrate limitation  with  Decreased functional use, Decreased strength and Continued pain    Goals: ( 6 weeks)   1)  Patient to be IND with HEP and modalities for pain management - met  2)  Decrease pain to 2/10 with activity/work.   3)   Increase endurance with computer work/playing musical instrument without pain.  4) Pt will demonstrate and verbalize good understanding of modifications for  work/leisure activities. - met        Plan: Advance strengthening next visit.  Patient to be treated by Occupational Therapy    1 -2 times  week  for   6-8                   weeks  during the certification period from   9/28/2017     to  11/28/2017   to achieve the established goals.

## 2017-10-16 ENCOUNTER — OFFICE VISIT (OUTPATIENT)
Dept: OPHTHALMOLOGY | Facility: CLINIC | Age: 67
End: 2017-10-16
Payer: COMMERCIAL

## 2017-10-16 DIAGNOSIS — H57.12 EYE PAIN, LEFT: Primary | ICD-10-CM

## 2017-10-16 DIAGNOSIS — H18.452 SALZMANN'S NODULAR DEGENERATION OF CORNEA OF LEFT EYE: ICD-10-CM

## 2017-10-16 DIAGNOSIS — H18.529 ANTERIOR BASEMENT MEMBRANE DYSTROPHY: ICD-10-CM

## 2017-10-16 PROCEDURE — 68761 CLOSE TEAR DUCT OPENING: CPT | Mod: 78,LT,S$GLB, | Performed by: OPHTHALMOLOGY

## 2017-10-16 PROCEDURE — 99999 PR PBB SHADOW E&M-EST. PATIENT-LVL III: CPT | Mod: PBBFAC,,, | Performed by: OPHTHALMOLOGY

## 2017-10-16 PROCEDURE — 92014 COMPRE OPH EXAM EST PT 1/>: CPT | Mod: 24,25,S$GLB, | Performed by: OPHTHALMOLOGY

## 2017-10-16 RX ORDER — CLONAZEPAM 0.5 MG/1
TABLET ORAL
Qty: 180 TABLET | Refills: 1 | Status: SHIPPED | OUTPATIENT
Start: 2017-10-16 | End: 2018-02-02

## 2017-10-16 NOTE — PROGRESS NOTES
"HPI     Referred by Dr Ocampo     S/p superficial keratectomy OS 8/18/17  - for RES     ABMD OU   RES     Gtts: Refresh PRN OU    Patient returns with complaints of FBS OS and intermittent jaime pain.    feels pain has never actually went away since procedure on OS.   Pain can increase to about 8 on pain scale extending to "forehead above   brow and in the inner corner of his eye".     Last edited by Faith Stratton, OD on 10/16/2017  8:39 AM. (History)            Assessment /Plan     For exam results, see Encounter Report.    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 Fit pt with daily CL OS    PROCEDURE NOTE:    Diagnosis:  See above.    Indication:  Severe keratoconjunctivitis sicca    Procedure: Permanent punctal plug placed LLL today at the slit lamp, size 0.9.      C/O: none                     "

## 2017-10-17 ENCOUNTER — CLINICAL SUPPORT (OUTPATIENT)
Dept: REHABILITATION | Facility: HOSPITAL | Age: 67
End: 2017-10-17
Attending: ORTHOPAEDIC SURGERY
Payer: COMMERCIAL

## 2017-10-17 DIAGNOSIS — M25.531 PAIN IN RIGHT WRIST: Primary | ICD-10-CM

## 2017-10-17 DIAGNOSIS — M77.8 RIGHT WRIST TENDINITIS: ICD-10-CM

## 2017-10-17 PROCEDURE — 97110 THERAPEUTIC EXERCISES: CPT

## 2017-10-17 PROCEDURE — 97018 PARAFFIN BATH THERAPY: CPT | Mod: 59

## 2017-10-17 PROCEDURE — 97140 MANUAL THERAPY 1/> REGIONS: CPT

## 2017-10-17 NOTE — PROGRESS NOTES
Refer to POC  OT  Re-evaluation Note    Patient:  Raffy Rutherford Jr.  Clinic #:  [id number]   Date of Note: 10/17/2017   Referring Physician:  Cici Carvajal, *  Diagnosis:  Right wrist tendinitis  Encounter Diagnoses   Name Primary?    Pain in right wrist Yes    Right wrist tendinitis         Visit 11       Start Time: 11:00  End Time: 12:00  Total Time: 60 min  Group Time: 0      Subjective:. My pain is very rare now. I am playing for 30 minutes a day at home.  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 Pt agreed that his depression probably contributes to his pain.      Patient seen by OT this session. Treatment  Consisted of the following: Patient received paraffin with moist heat to right hand  for 15 minutes to increase blood flow, circulation, desensitization, sensory re-education and for pain management.     Pt then received STM and IASTYM to right wrist volar and dorsal regions. Pt then performed wrist ext/flex stretches with elbow extended 1 x 10 reps. Pt performed  yellow putty with written HEP to be performed 1 x daily and performed the followin' molding, 2' grasping, finger ext x 10 reps, 3 logs ea key and 3 pt, 1 yellow band digi-extend x 30 reps, 1 lb wrist flex/ext x 15 reps. Pt received a cold pack for 10 min following ex.    Treatment: Paraffin x 15 min and Manual therapy x 10 min, Therex 25 min, Ice  10 min     Assessment:   Pt tolerated treatment with no c/o pain. Advanced strengthening ex.  Pt encouraged to continue playing his instrument in small increments. Pt verbalized good understanding.  Pt will continue to benefit from skilled OT intervention.   Patient is making good progress toward established goals.   Patient continues to demonstrate limitation  with  Decreased functional use, Decreased strength and Continued pain    Goals: ( 6 weeks)   1)  Patient to be IND with HEP and modalities for pain management - met  2)  Decrease pain to 2/10 with activity/work.   3)   Increase endurance with computer work/playing musical instrument without pain.  4) Pt will demonstrate and verbalize good understanding of modifications for  work/leisure activities. - met        Plan: Advance as tolerated.  Patient to be treated by Occupational Therapy    1 -2 times  week  for   6-8                   weeks  during the certification period from   9/28/2017     to  11/28/2017   to achieve the established goals.

## 2017-10-19 ENCOUNTER — OFFICE VISIT (OUTPATIENT)
Dept: NEUROLOGY | Facility: CLINIC | Age: 67
End: 2017-10-19
Payer: COMMERCIAL

## 2017-10-19 VITALS
DIASTOLIC BLOOD PRESSURE: 79 MMHG | HEIGHT: 68 IN | BODY MASS INDEX: 27.7 KG/M2 | SYSTOLIC BLOOD PRESSURE: 114 MMHG | HEART RATE: 67 BPM | WEIGHT: 182.75 LBS

## 2017-10-19 DIAGNOSIS — M21.372 FOOT DROP, BILATERAL: Primary | ICD-10-CM

## 2017-10-19 DIAGNOSIS — M21.371 FOOT DROP, BILATERAL: Primary | ICD-10-CM

## 2017-10-19 PROCEDURE — 99213 OFFICE O/P EST LOW 20 MIN: CPT | Mod: S$GLB,,, | Performed by: PSYCHIATRY & NEUROLOGY

## 2017-10-19 PROCEDURE — 99999 PR PBB SHADOW E&M-EST. PATIENT-LVL III: CPT | Mod: PBBFAC,,, | Performed by: PSYCHIATRY & NEUROLOGY

## 2017-10-19 NOTE — LETTER
October 23, 2017      Nini Rendon MD  [address]  [facility] [address]           [facility]  [address]  [facility] [address]  Phone: [phone]  Fax: [phone]          Patient: Raffy Rutherford Jr.   MR Number: [id number]   YOB: 1950   Date of Visit: 10/19/2017       Dear Dr. Nini Rendon:    Thank you for referring Raffy Rutherford to me for evaluation. Attached you will find relevant portions of my assessment and plan of care.    If you have questions, please do not hesitate to call me. I look forward to following Raffy Rutherford along with you.    Sincerely,    Jason Brewer MD    Enclosure  CC:  No Recipients    If you would like to receive this communication electronically, please contact [email or web address] or [phone] to request more information on [facility] access.    For providers and/or their staff who would like to refer a patient to [facility], please contact us through our one-stop-shop provider referral line, [facility], at [phone].    If you feel you have received this communication in error or would no longer like to receive these types of communications, please e-mail [email or web address]

## 2017-10-19 NOTE — PROGRESS NOTES
Patient Name: Raffy Rutherford Jr.  MRN: [id number]    CC: LE weakness/pain    Interval 10/19/17:  Last three months has noticed some buckling of LE.   Still going to the gym and not losing gains - including knee extensions, flexion, and DF/PF work.     Mild bilateral hand tingling when he wakes up. Recently told he has tendinitis in right forearm.    Mother and brother with similar symptoms.     Interval: Is a 65yo male with chronic back and lower extremity pain, who recently underwent LS surgery in 2012, without resolution of symptoms. He had left hip replacement surgery that same year - that helped his pain a great deal.    No fasciculations, minimal cramping. No UE weakness.     HPI: Raffy Rutherford Jr. is a 67 y.o. male with a twenty year history of pain over the left side of his face and which radiates posteriorly to his neck and above the left shoulder blade. It is intermittent and may come and go for weeks at a time and then disappear for weeks. Scents are the predominant trigger for the pain, along with weather changes. He has treated it with fiornal in the past, which will usually abort the pain if he catches it early in the pain cycle. If he takes too much fiornal, the pain can worsen. He will also use a TENS unit on his face and shoulder, which help the pain. He's had pain for the last five or six days. He has difficulty articulating his thoughts when he has a headache.     Prior to this, he's had the pain, on average, two days a week every week. He can go weeks without symptoms. He takes lamictal for affective disorder and noticed that when he first started taking it, he had the pain much less frequently. He was tried on lyrica for a brief period of time, but he discontinued due to side effects. He stopped gabapentin for the same reasons.     He's currently taking 150mg of lamictal/day. At 200mg/d, he gets very sleepy.    He's had right leg pain and right food burning/pins and needles feeling for several  years. Recently, he has noticed burning in the medial-posterior aspect of his right leg. He has had lumbar surgery before for symptoms of low back pain radiating to left buttock.    History of car sickness as a child. Sister with migraines. No history of head trauma.         ROS: No vision changes. No dsyarthria or dysphagia. No weakness.  Remainder of review of systems negative.    Past Medical History  Past Medical History:   Diagnosis Date    Adenomatous polyp 2003    Adenomatous polyp     Allergy     Arthritis     Back pain     after trauma beginning in 195    Chronic pain     neck and left shoulder    Cluster headache 2013    Colon polyp     Degenerative disc disease     Depression     Diverticulitis 12/2013    Elevated PSA     Fibromyalgia 2013    GERD (gastroesophageal reflux disease)     Hepatitis 1970's    A    History of prostate biopsy 2002    Hyperlipidemia     Joint pain     Sleep apnea     Special screening for malignant neoplasms, colon 5/5/2014    Thyroid nodule 7/16/2014    Visual disturbance 2012    problems after cataract surgery       Medications    Current Outpatient Prescriptions:     benzonatate (TESSALON) 100 MG capsule, Take 200 mg by mouth 3 (three) times daily as needed., Disp: , Rfl: 1    clonazePAM (KLONOPIN) 0.5 MG tablet, TAKE TWO TABLETS BY MOUTH ONCE DAILY, Disp: 180 tablet, Rfl: 3    clonazePAM (KLONOPIN) 0.5 MG tablet, TAKE TWO TABLETS BY MOUTH ONCE DAILY, Disp: 180 tablet, Rfl: 1    cyanocobalamin 1,000 mcg/mL injection, , Disp: , Rfl: 1    doxycycline (MONODOX) 50 MG Cap, Take 1 capsule (50 mg total) by mouth 2 (two) times daily., Disp: 60 capsule, Rfl: 6    fish oil-omega-3 fatty acids 300-1,000 mg capsule, Take 2 g by mouth once daily., Disp: , Rfl:     hydrocortisone-pramoxine (ANALPRAM-HC) 2.5-1 % Crea, 2.5 mg., Disp: , Rfl:     ketoconazole (NIZORAL) 2 % cream, Apply to affected area 2 times a day for 2 weeks, Disp: , Rfl: 1    lamotrigine  (LAMICTAL) 100 MG tablet, Take 1 tablet (100 mg total) by mouth once daily., Disp: 90 tablet, Rfl: 3    lidocaine-prilocaine (EMLA) cream, , Disp: , Rfl:     meclizine (ANTIVERT) 25 mg tablet, Take 1 tablet (25 mg total) by mouth every 6 (six) hours. Prn vertigo (Patient taking differently: Take 25 mg by mouth every 6 (six) hours as needed. Prn vertigo), Disp: 25 tablet, Rfl: 1    meloxicam (MOBIC) 15 MG tablet, Take 1 tablet (15 mg total) by mouth once daily., Disp: 30 tablet, Rfl: 2    pravastatin (PRAVACHOL) 40 MG tablet, Take 1 tablet (40 mg total) by mouth once daily., Disp: 90 tablet, Rfl: 3    rabeprazole (ACIPHEX) 20 mg tablet, Take 1 tablet (20 mg total) by mouth 2 (two) times daily. (Patient taking differently: Take 20 mg by mouth once daily. ), Disp: 180 tablet, Rfl: 3    selegiline (EMSAM) 12 mg/24 hr, Place 1 patch onto the skin once daily., Disp: 90 patch, Rfl: 3    senna-docusate 8.6-50 mg (PERICOLACE) 8.6-50 mg per tablet, Take 2 tablets by mouth nightly as needed for Constipation., Disp: , Rfl:     sucralfate (CARAFATE) 1 gram tablet, as needed. , Disp: , Rfl:     trazodone (DESYREL) 100 MG tablet, Take 2 tablets (200 mg total) by mouth every evening., Disp: 180 tablet, Rfl: 3    UNABLE TO FIND, medication name: cefaly + electrodes, wear 20-30 mins, Dx: migraine, Disp: 1 Units, Rfl: 0    VITAMIN D2 50,000 unit capsule, TAKE 1 CAPSULE BY MOUTH TWICE A WEEK, Disp: 24 capsule, Rfl: 3    VOLTAREN 1 % Gel, , Disp: , Rfl:     Allergies  Allergies   Allergen Reactions    Alphagan [Brimonidine]      Patient taking MASO-B Selective Inhibitor Selegiline (Emsam)    Coumadin [Warfarin]      itch    Oxycodone      hiccups       Social History  Social History     Social History    Marital status:      Spouse name: N/A    Number of children: N/A    Years of education: N/A     Occupational History    Psychotherpist       Social History Main Topics    Smoking status: Never Smoker     Smokeless tobacco: Never Used    Alcohol use Yes      Comment: occasion    Drug use: No    Sexual activity: Yes     Partners: Female     Other Topics Concern    Not on file     Social History Narrative    No narrative on file       Family History  Family History   Problem Relation Age of Onset    Cancer Mother      colon; uterine    Nephrolithiasis Mother     Stroke Mother 86    Pancreatitis Brother     Vision loss Brother      macular degeneration    Diabetes Brother     Macular degeneration Brother     Migraines Sister     No Known Problems Father     No Known Problems Maternal Grandmother     No Known Problems Maternal Grandfather     No Known Problems Paternal Grandmother     No Known Problems Paternal Grandfather     No Known Problems Sister     No Known Problems Maternal Aunt     No Known Problems Maternal Uncle     No Known Problems Paternal Aunt     No Known Problems Paternal Uncle     Melanoma Neg Hx     Amblyopia Neg Hx     Blindness Neg Hx     Cataracts Neg Hx     Glaucoma Neg Hx     Hypertension Neg Hx     Retinal detachment Neg Hx     Strabismus Neg Hx     Thyroid disease Neg Hx        Physical Exam  There were no vitals taken for this visit.    General appearance: Well-developed, well-groomed.     Neurologic Exam: The patient is awake, alert and oriented. Language is fluent. Recent and remote memory are normal. Attention span and concentration are normal. Fund of knowledge is appropriate.     Cranial nerves: pupils are round and reactive to light and accommodation. Visual fields are full to confrontation. Ocular motility is full in all cardinal positions of gaze. Facial sensation is normal to pinprick and light touch. Corneal reflexes are present bilaterally. Facial activation is symmetric. Hearing is normal bilaterally. Palate elevates symmetrically and gag reflex is intact bilaterally. Shoulder elevation is symmetric and full strength bilaterally. Tongue is midline  and neck rotation strength is normal bilaterally. Neck range of motion is normal.     Motor examination of all extremities demonstrates normal bulk and tone in all four limbs except for severe narrowing at the ankles.  Strength is 5/5 in the upper and lower extremities bilaterally except for 4/5 strength in the right TA and trace weakness in the left TA. Toe extensors are 3/5 bilaterally.     Sensory examination is noteable for decrement to vibratory sensation in both lower extremities. He has decrement to pinprick sensation in bilateral L5 dermatomes. Romberg is negative.    Deep tendon reflexes are 2+ and symmetric in the upper and lower extremities bilaterally except for zero at that ankles. Toes are mute bilaterally.     Gait: Cannot heel walk on either side. Toe walking preserved.     Coordination: Finger to nose and heel to shin testing is normal in both upper and lower extremities. Rapid alternating movements are normal in both upper and lower extremities.     General exam  Cardiovascular: regular rate and rhythm with no murmurs, rubs or gallops. There are no carotid or vertebral artery bruits. Pulses in both upper and lower extremities are symmetric. There is no peripheral edema.   Head and neck: no cervical lymphadenopathy    6/19 TSH: nl, B12 nl, B6 nl  4/21 CK: 323  3/14 Urine Imfix: nl  11/13 SPEP: nl  11/13 IMFix: nl  10/13 ESR/CRP: nl      Images:     MRI 3/23/15:     T12-L1, L1-L2, L2-L3: No significant central canal or neural foraminal narrowing.      L3-L4: There is postoperative change from prior microdiscectomy.  There is grade 1 anterolisthesis of L3 on L4 with uncovering of the intervertebral disk, new from prior study.  There is severe facet arthrosis.  There is mild bilateral neural foraminal   narrowing and mild central canal stenosis.  Mild scar tissue noted in the operative bed.    L4-L5: There is a circumferential disk bulge and mild facet arthrosis.  No significant central canal or neural  foraminal narrowing.      L5-S1: There is a circumferential disk bulge and mild facet arthrosis.  No significant central canal or neural foraminal narrowing.    MRI 3/3/14:    Lumbar spine alignment is within normal limits. The vertebral body heights are well maintained, with no fracture. There is a remote fracture of the T12 vertebral body which is undergone prior vertebroplasty. There is mild anterior wedging of the L1   vertebral body. No marrow signal abnormality suspicious for an infiltrative process. There is disk desiccation and disk space narrowing at L3-L4 and L4-L5 with endplate edema at L3-L4.     The conus is normal in appearance, and terminates at the L1 level. The adjacent soft tissue structures show no significant abnormalities.     There are findings of multi-level lumbar spondylosis, as below.    L1-L2: There is no focal disc herniation. No significant central canal or neural foraminal narrowing.    L2-L3: There is no focal disc herniation. No significant central canal or neural foraminal narrowing.    L3-L4: There is postsurgical change from prior diskectomy. Today's study demonstrates posterior central protrusion resulting in mild canal stenosis and moderate right/mild left neural foraminal narrowing. Postcontrast images demonstrate enhancement   consistent with scar and decreased from the prior examination.      L4-L5: There diffuse disk bulge and mild bilateral facet arthropathy without canal or foraminal narrowing.. No significant central canal or neural foraminal narrowing.    L5-S1: There is no focal disc herniation. No significant central canal or neural foraminal narrowing.      MRI LS spine 11/5/12 - Pre-surgical study  There is a remote fracture of the T12 vertebral body which has undergone prior vertebroplasty. There is mild anterior wedging of the L1 vertebral body which is stable when compared to prior examination. There is no evidence of acute fracture or marrow   replacement process.  There is disk desiccation and disk space narrowing at L3-4 and L4-5 with endplate degenerative changes at L4-5. The conus terminates at L1. The visualized retroperitoneal structures demonstrate no significant abnormality.    T12-L1, L1-L2, L2-L3: There is no significant disk protrusion, central canal stenosis, or neural foraminal narrowing.    L3-4: There is a broad-based disk bulge with a superimposed large disk extrusion with or without superimposed hematoma. This extends into the left lateral recess causing significant central canal stenosis and significant mass effect on the descending   left L4 nerve root. This extrusion with or without hematoma measures approximately 2.1 cm TV x 1 cm AP x 1.8 cm CC. There is a separate density along the inferior margin and a superimposed sequestered disk fragment cannot be excluded. There is mild   facet arthropathy. There is no significant neural foraminal narrowing.    L4-5: There is mild facet arthropathy and ligamentum flavum thickening with a circumferential disk bulge. There is mild central canal stenosis. There is no significant neural foraminal narrowing.    L5-S1: There is a mild disk bulge. There is no significant central canal stenosis or neural foraminal narrowing.     MRI Cspine 6/2011  CERVICAL VERTEBRAL BODY HEIGHTS AND ALIGNMENT ARE WELL MAINTAINED AND   THERE IS NO SIGN OF PATHOLOGIC MARROW REPLACEMENT PROCESS OR FRACTURE.   THERE IS A HEMANGIOMA MEASURING APPROXIMATELY 0.6 CM IN THE CENTER OF THE   C4 VERTEBRAL BODY. INTERVERTEBRAL DISC HEIGHTS ARE MAINTAINED AND THERE   IS NO DISC BULGING. THE SPINAL CANAL IS WIDELY PATENT AND THE SPINAL   CORD IS UNREMARKABLE IN ITS DEMONSTRATED PORTION. THERE IS NO   INTRATHECAL MASS. THERE IS NO PARASPINOUS EDEMA AND THE REMAINING   PARASPINOUS SOFT TISSUES DEMONSTRATE NOTHING UNUSUAL. THE BASE OF THE   SKULL IS UNREMARKABLE.     IMPRESSION: UNREMARKABLE EXAMINATION OF THE CERVICAL SPINE    EMG 8/5/14: reduced motor CMAP  amplitudes/NR in LE. Preserved sensory responses. No evidence for conduction block. CNE with chronic and some acute changes in LE.    Assessment and Plan    Raffy Rutherford Jr. is a 67 y.o. male with many years of bilateral lower extremity weakness in the setting of LS stenosis/radiculopathy. His history suggests either a combination of architectural defect and/or hereditary motor neuropathy (e.g., HMN) since his mother and brother have similar symptoms. He seems to be doing well, clinically and at the gym, where he is maintaining or slightly improving his strength.     Discussed genetic testing, but he is not sure of the value at the current time.     Continue current approach.     F/U, prn.         Keith Brewer MD, [place], FAAN  Department of Neurology  [address] [place] [address]

## 2017-10-20 ENCOUNTER — CLINICAL SUPPORT (OUTPATIENT)
Dept: REHABILITATION | Facility: HOSPITAL | Age: 67
End: 2017-10-20
Attending: ORTHOPAEDIC SURGERY
Payer: COMMERCIAL

## 2017-10-20 DIAGNOSIS — M25.531 PAIN IN RIGHT WRIST: Primary | ICD-10-CM

## 2017-10-20 DIAGNOSIS — M77.8 RIGHT WRIST TENDINITIS: ICD-10-CM

## 2017-10-20 PROCEDURE — 97110 THERAPEUTIC EXERCISES: CPT

## 2017-10-20 PROCEDURE — 97140 MANUAL THERAPY 1/> REGIONS: CPT

## 2017-10-20 NOTE — PROGRESS NOTES
OT DAILY Progress Note    Patient:  Raffy Rutherford Jr.  Clinic #:  [id number]   Date of Note: 10/20/2017   Referring Physician:  Cici Carvajal, *  Diagnosis:  Right wrist tendinitis  Encounter Diagnoses   Name Primary?    Pain in right wrist Yes    Right wrist tendinitis         Visit 12       Start Time: 10:30  End Time: 11:20  Total Time: 50 min  Group Time: 0      Subjective:. My pain is very rare now. I am playing for 30 minutes a day at home.  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 Pt agreed that his depression probably contributes to his pain.      Patient seen by OT this session. Treatment  Consisted of the following: Patient received paraffin with moist heat to right hand  for 15 minutes to increase blood flow, circulation, desensitization, sensory re-education and for pain management.     Pt then received STM and IASTYM to right wrist volar and dorsal regions. Pt then performed wrist ext/flex stretches with elbow extended 1 x 10 reps. Pt performed  yellow putty as followis: 2' molding, 2' grasping, finger ext x 10 reps, 3 logs ea key and 3 pt, 1 yellow band digi-extend x 30 reps, 1 lb wrist flex/ext 2 x 15 reps. Kinesiotaped both volar and dorsal forearm with 25% stretch and 100% correction over volar wrist.    Treatment: Paraffin x 15 min and Manual therapy x 10 min, Therex 25 min,      Assessment:   Pt  tolerated treatment with no c/o pain. Advanced strengthening ex.  Pt encouraged to continue playing his instrument in small increments. Pt verbalized good understanding.  Pt will continue to benefit from skilled OT intervention.   Patient is making good progress toward established goals.   Patient continues to demonstrate limitation  with  Decreased functional use, Decreased strength and Continued pain    Goals: ( 6 weeks)   1)  Patient to be IND with HEP and modalities for pain management - met  2)  Decrease pain to 2/10 with activity/work.   3)   Increase endurance with computer work/playing musical instrument without pain.  4) Pt will demonstrate and verbalize good understanding of modifications for  work/leisure activities. - met        Plan: Advance as tolerated.  Patient to be treated by Occupational Therapy    1 -2 times  week  for   6-8                   weeks  during the certification period from   9/28/2017     to  11/28/2017   to achieve the established goals.

## 2017-10-26 ENCOUNTER — CLINICAL SUPPORT (OUTPATIENT)
Dept: REHABILITATION | Facility: HOSPITAL | Age: 67
End: 2017-10-26
Attending: ORTHOPAEDIC SURGERY
Payer: COMMERCIAL

## 2017-10-26 DIAGNOSIS — M25.531 PAIN IN RIGHT WRIST: Primary | ICD-10-CM

## 2017-10-26 PROCEDURE — 97140 MANUAL THERAPY 1/> REGIONS: CPT

## 2017-10-26 PROCEDURE — 97110 THERAPEUTIC EXERCISES: CPT

## 2017-10-26 NOTE — PROGRESS NOTES
OT DAILY Progress Note    Patient:  Raffy Rutherford Jr.  Clinic #:  [id number]   Date of Note: 10/26/2017   Referring Physician:  Cici Carvajal, *  Diagnosis:  Right wrist tendinitis  Encounter Diagnosis   Name Primary?    Pain in right wrist Yes        Visit 13       Start Time: 3:00  End Time: 3:50  Total Time: 50 min  Group Time: 0      Subjective:. Pt reports he is practicing playing his instrument at home.  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 Pt agreed that his depression probably contributes to his pain.      Patient seen by OT this session. Treatment  Consisted of the following: Patient received paraffin with moist heat to right hand  for 15 minutes to increase blood flow, circulation, desensitization, sensory re-education and for pain management.     Pt then received STM and IASTYM to right wrist volar and dorsal regions. Pt then performed wrist ext/flex stretches with elbow extended 1 x 10 reps. Pt performed  yellow putty as followis: 2' molding, 2' grasping, finger ext x 10 reps, 3 logs ea key and 3 pt, 2 yellow bands digi-extend x 30 reps, 1 lb wrist flex/ext 2 x 15 reps. Kinesiotaped both volar and dorsal forearm with 25% stretch and 100% correction over volar wrist and mobil wad.    Treatment: Paraffin x 15 min and Manual therapy x 10 min, Therex 25 min,      Assessment:   Pt tolerated treatment with no  c/o pain. Advanced strengthening ex.  Pt encouraged to continue playing his instrument in small increments. Pt verbalized good understanding.  Pt will continue to benefit from skilled OT intervention.   Patient is making good progress toward established goals.   Patient continues to demonstrate limitation  with  Decreased functional use, Decreased strength and Continued pain    Goals: ( 6 weeks)   1)  Patient to be IND with HEP and modalities for pain management - met  2)  Decrease pain to 2/10 with activity/work.   3)   Increase endurance with computer work/playing musical instrument without pain.  4) Pt will demonstrate and verbalize good understanding of modifications for  work/leisure activities. - met        Plan: Advance as tolerated.  Patient to be treated by Occupational Therapy    1 -2 times  week  for   6-8                   weeks  during the certification period from   9/28/2017     to  11/28/2017   to achieve the established goals.

## 2017-11-01 ENCOUNTER — CLINICAL SUPPORT (OUTPATIENT)
Dept: REHABILITATION | Facility: HOSPITAL | Age: 67
End: 2017-11-01
Attending: PSYCHIATRY & NEUROLOGY
Payer: COMMERCIAL

## 2017-11-01 DIAGNOSIS — R26.2 DIFFICULTY WALKING: ICD-10-CM

## 2017-11-01 DIAGNOSIS — R53.1 WEAKNESS: ICD-10-CM

## 2017-11-01 PROCEDURE — G8980 MOBILITY D/C STATUS: HCPCS | Mod: CK

## 2017-11-01 PROCEDURE — G8979 MOBILITY GOAL STATUS: HCPCS | Mod: CK

## 2017-11-01 PROCEDURE — 97162 PT EVAL MOD COMPLEX 30 MIN: CPT

## 2017-11-01 PROCEDURE — 97110 THERAPEUTIC EXERCISES: CPT

## 2017-11-01 PROCEDURE — G8978 MOBILITY CURRENT STATUS: HCPCS | Mod: CK

## 2017-11-01 NOTE — PLAN OF CARE
Physical Therapy Evaluation    Name: Raffy Rutherford Jr.  Clinic Number: [id number]    Diagnosis:   Encounter Diagnoses   Name Primary?    Difficulty walking     Weakness      Physician: Jason Brewer, *  Treatment Orders: PT Eval and Treat    History     Past Medical History:   Diagnosis Date    Adenomatous polyp 2003    Adenomatous polyp     Allergy     Arthritis     Back pain     after trauma beginning in 195    Chronic pain     neck and left shoulder    Cluster headache 2013    Colon polyp     Degenerative disc disease     Depression     Diverticulitis 12/2013    Elevated PSA     Fibromyalgia 2013    GERD (gastroesophageal reflux disease)     Hepatitis 1970's    A    History of prostate biopsy 2002    Hyperlipidemia     Joint pain     Sleep apnea     Special screening for malignant neoplasms, colon 5/5/2014    Thyroid nodule 7/16/2014    Visual disturbance 2012    problems after cataract surgery     Current Outpatient Prescriptions   Medication Sig    benzonatate (TESSALON) 100 MG capsule Take 200 mg by mouth 3 (three) times daily as needed.    clonazePAM (KLONOPIN) 0.5 MG tablet TAKE TWO TABLETS BY MOUTH ONCE DAILY    clonazePAM (KLONOPIN) 0.5 MG tablet TAKE TWO TABLETS BY MOUTH ONCE DAILY    cyanocobalamin 1,000 mcg/mL injection     doxycycline (MONODOX) 50 MG Cap Take 1 capsule (50 mg total) by mouth 2 (two) times daily.    fish oil-omega-3 fatty acids 300-1,000 mg capsule Take 2 g by mouth once daily.    hydrocortisone-pramoxine (ANALPRAM-HC) 2.5-1 % Crea 2.5 mg.    ketoconazole (NIZORAL) 2 % cream Apply to affected area 2 times a day for 2 weeks    lamotrigine (LAMICTAL) 100 MG tablet Take 1 tablet (100 mg total) by mouth once daily.    lidocaine-prilocaine (EMLA) cream     meclizine (ANTIVERT) 25 mg tablet Take 1 tablet (25 mg total) by mouth every 6 (six) hours. Prn vertigo (Patient taking differently: Take 25 mg by mouth every 6 (six) hours as needed. Prn vertigo)     meloxicam (MOBIC) 15 MG tablet Take 1 tablet (15 mg total) by mouth once daily.    pravastatin (PRAVACHOL) 40 MG tablet Take 1 tablet (40 mg total) by mouth once daily.    rabeprazole (ACIPHEX) 20 mg tablet Take 1 tablet (20 mg total) by mouth 2 (two) times daily. (Patient taking differently: Take 20 mg by mouth once daily. )    selegiline (EMSAM) 12 mg/24 hr Place 1 patch onto the skin once daily.    senna-docusate 8.6-50 mg (PERICOLACE) 8.6-50 mg per tablet Take 2 tablets by mouth nightly as needed for Constipation.    sucralfate (CARAFATE) 1 gram tablet as needed.     trazodone (DESYREL) 100 MG tablet Take 2 tablets (200 mg total) by mouth every evening.    UNABLE TO FIND medication name: cefaly + electrodes, wear 20-30 mins, Dx: migraine    VITAMIN D2 50,000 unit capsule TAKE 1 CAPSULE BY MOUTH TWICE A WEEK    VOLTAREN 1 % Gel      No current facility-administered medications for this visit.      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    Primary concern/ Chief complaints:  Raffy is a 67 y.o. male that presents to [facility] secondary to lumbar spine pain and foot drop bilaterally. Injury/surgery occurred approximately: 2017. Pt. presents with the following co-morbidities and personal factors  that directly impact his plan of care: chronicity of condition, multiple co-morbidities, and nerve damage.   X-ray/MRI was taken and revealed: Postoperative changes of spinal fusion with associated pedicular screws identified at the L3/L4/L5 level.  The position and alignment is satisfactory.  Status post T12 vertebroplasty. Pt reports numbness and tingling in right LE from calf down to arch and left mid calf to foot.     Red flags:  Pt. denies bowel/bladder symptoms (urinary retention/fecal incontinence). Recent weight loss? No; Constant/Night pain that is unchanging with change of position? No; PMH of CA? No    Onset/EHSAN: gradual: 2012 sx for left hip and lumbar spine, therapy after each sx; symptoms of weakness and increased pain that caused him to seek out MD consult. Dr. Smith recommended pt. to undergo sx: L3/4 and L4/5 fusion in 2015. Pt. most recently seen in therapy in May of 2017. Pt. has had an increased symptoms of bilateral LE weakness over the last three to 4 weeks with most recent fall last week. Pt. was walking when he tripped on a pebble that caused pain on plantar surface of foot he reacted and fell backwards into the garage behind him. Pt. denies falling onto the ground.    Previous treatment: multiple bouts of PT, Healthy back, and surgeries: 2012 and 2015.    Pain Scale: Raffy rates pain on a scale of 0-10 to be 7 currently; 1 at best; 7 at worst .    Aggravating factors: driving, prolonged sitting, lifting OH into a cabinet, climbing stairs, getting in out bed/chair, household tasks, using a broom, and lifting groceries from floor  Relieving factors: ice, TENs unit, and medication rarely for pain regulation    ADLs: Pt has a decreased ability to perform ADLs such as see above.    Patient Goals: Pt would like to decrease pain and increase function so he can return to his normal daily activities with manageable symptoms and decrease risk of falls.    Objective     Observation: standing  throughout most of evaluation due to pain; standing/supine: right ilial anterior/left ilial posterior    Posture: subcranial hyperextension, cervical flattened lordosis, FHP, mild Tspine kyphosis, rolled anterior/IR shoulders, shoulder protraction, and excessive hip/knee flexion    Lumbar ROM: (measured in degrees)    Degrees Quality   Flexion   15    Left Side Bending 30    Right Side Bending 15      Dermatomes: (impaired/normal)     RLE LLE   L2 Intact Intact   L3 Intact Intact   L4 Intact Intact   L5 Intact Intact   S1 Intact Intact     Reflexes: L3/S1: 2 bilateral LEs    Lower Extremity Strength (graded 0-5 out of 5)   RLE LLE   Hip flexion: 4+/5 4-/5   Hip ER 4/5 3-/5   Hip IR 4+/5 3+/5   Knee extension: 5/5 4+/5   Ankle dorsiflexion: 3-/5 3/5   Great toe extension: 3-/5 3-/5   Posterior fibers of Gluteus medius 4-/5 4-/5   Knee flexion 4+/5 5/5   Ankle plantarflexion 4/5 4+/5   Hip extension: 4-/5 4/5     Special Tests: ((+): pos.; (-): neg.)   · SLR Test: bilateral 70 deg.  · ASLR Test: +  · Bridge Test: +  · Pirformis Test: right piriformis mod./max tightness negative for nerve symptoms    Flexibility:   · Popliteal Angle: R = 70  degrees ; L = 70 degrees    Palpation for condition:   · Position: see observation  · Warmth: normal  · Swelling: none  · Texture: hypertonic right piriformis    Joint Mobility: (graded 0-6 out of 6) right hip flexion: 2/6    Functional Status Measures:    Intake Score     Pts Physical FS Primary Measure      44                          Risk Adjustment Statistical FOTO     49        PT reviewed FOTO scores for Raffy Rutherford Jr. on 11/01/2017.   FOTO scores were entered into EPIC - see media section.    History  Co-morbidities and personal factors that may impact the plan of care Examination  Body Structures and Functions, activity limitations and participation restrictions that may impact the plan of care Clinical Presentation   Decision Making/ Complexity Score   Co-morbidities:    lumbar DDD/DJD, left SHARAD, sciatic nerve neuropathy            Personal Factors:   none Body Regions: SIJ, hip, and lumbar spine    Body Systems: Decreased AROM; pelvic dysfunction; core hip lumbopelvic weakness; poor posture; pain with transitional activities, decrease exercise ability, and pain with ADLs.     Activity limitations: community ambulation, prolonged sitting, lifting, bending/stooping, moderate/heavy physical activity, and work related activities    Participation Restrictions: pain limits his participate prolonged job related activities   evolving with changing clinical characteristics   moderate     Clinical Presentation/complexity category  Moderate complexity category: pt. has 1-2 personal factors and/or co-morbidities directly  impacting POC, 3 or more body system impairments/functional limitations/participation restrictions; as well as, condition is evolving with changing clinical characteristics.    PT Evaluation Completed? Yes  Discussed Plan of Care with patient: Yes    TREATMENT:  Therapeutic exercise: Raffy received therapeutic exercises to develop strength and endurance, flexibility for 10 minutes including: [id number]    Pt. Education: Instructed pt. regarding:body mechanics, posture, activity modification/avoidance, and proper technique with all exercises. Pt. to demonstrate good understanding of the education provided. Raffy demonstrated good return demonstration of activities. No cultural, environmental, or spiritual barriers identified to treatment or learning.    Medical necessity is demonstrated by the following IMPAIRMENTS/PROBLEM LIST:   1) Pain limiting function   2) Posture dysfunction   3) Core/Lumbar/LE weakness   4) Decreased thoracic/lumbar joint mobility   5) Decreased Lumbar ROM   6) Decreased soft tissue extensibility/fascia restriction   7) Decreased LE flexibility: bilateral hamstrings and right piriformis   8) Lack of HEP   9) LE paresthesia bilaterally right greater  than left    GOALS:   Short Term Goals:  4 weeks  1. Report decreased lumbar pain </= 5/10 at worst to increase tolerance for prolonged sitting  2. Pt. to demonstrate proper cervical and scapula retraction requiring min. to no verbal cues from PT  3. Pt. to demonstrate increased MMT for right DF to 3/5 to increase ability to clear toes during gait/stair negotiation.   4. Pt to tolerate HEP to improve ROM and independence with ADL's  5. Pt. to be independent with symptom management    Long Term Goals: 8 weeks  1. Report decreased lumbar pain </=  2/10 at worst to increase tolerance for prolonged sitting  2. Pt. to demonstrate proper cervical and scapula retraction requiring no verbal cues from PT  3. Pt. to demonstrate increased MMT for core/lumbar paraspinals to 3+/5 to increase endurance with prolonged sitting.   4. Pt. to demonstrate increased MMT for bilateral gluteus medius to 4+/5  to increase stability during ambulation on uneven surfaces.  5. Pt. to demonstrate increased MMT for bilateral DF to 3+/5 to decrease likelihood of falls during community ambulation.  7. Pt. to demonstrate increased MMT for left hip flexor to 4+/5 to increase tolerance for ADL and work activities.   8. Pt to be independent with HEP to improve ROM and independence with ADL's  9. Pt. to report a decrease in bilatera LE paresthesia by >/= 25% to improve ability to perform community ambulation    Assessment   This is a 67 y.o. male referred to outpatient physical therapy who presents with a medical diagnosis of bilateral foot drop and PT diagnosis of weakness and difficulty walking demonstrating joint dysfunction and functional limitation as described above. Level of complexity is moderate;  based on patient's past medical history including the above co-morbidities and personal factors; functional limitations, and clinical presentation directly impacting his/her plan of care.     Patient was in agreement with set goals and plan of care.  Pt was given a HEP consisting of posture reeducation, diaphragmatic breathing, instruction on body mechanics, activity modification/avoidance, and core/lumbar/LE strengthening regimen. Pt. verbally understood instructions and demonstrated proper form/technique. Pt was advised to perform these exercises free of pain, and discontinue use if symptoms persist/worsen. Pt will benefit from physical therapy services in order to maximize pain free functional independence.     Plan     Pt will be treated by physical therapy 1-3 times a week for 8 weeks for pt. education, HEP, aquatic therapy if land based regimen is not tolerable, therapeutic exercises, neuromuscular re-education, soft tissue and joint mobilizations; and modalities prn to achieve established goals. Raffy may at times be seen by a PTA as part of the Rehab Team.     I certify the need for these services furnished under this plan of treatment and while under my care.______________________________ Physician/Referring Practitioner  Date of Signature

## 2017-11-06 ENCOUNTER — OFFICE VISIT (OUTPATIENT)
Dept: OTOLARYNGOLOGY | Facility: CLINIC | Age: 67
End: 2017-11-06
Payer: COMMERCIAL

## 2017-11-06 ENCOUNTER — CLINICAL SUPPORT (OUTPATIENT)
Dept: REHABILITATION | Facility: HOSPITAL | Age: 67
End: 2017-11-06
Attending: PSYCHIATRY & NEUROLOGY
Payer: COMMERCIAL

## 2017-11-06 VITALS
SYSTOLIC BLOOD PRESSURE: 107 MMHG | BODY MASS INDEX: 28.49 KG/M2 | WEIGHT: 187.38 LBS | HEART RATE: 72 BPM | DIASTOLIC BLOOD PRESSURE: 1 MMHG

## 2017-11-06 DIAGNOSIS — R26.2 DIFFICULTY WALKING: ICD-10-CM

## 2017-11-06 DIAGNOSIS — R53.1 WEAKNESS: ICD-10-CM

## 2017-11-06 DIAGNOSIS — Z86.69 HISTORY OF HEARING LOSS: ICD-10-CM

## 2017-11-06 DIAGNOSIS — H61.23 BILATERAL IMPACTED CERUMEN: Primary | ICD-10-CM

## 2017-11-06 PROCEDURE — 97014 ELECTRIC STIMULATION THERAPY: CPT

## 2017-11-06 PROCEDURE — 97110 THERAPEUTIC EXERCISES: CPT

## 2017-11-06 PROCEDURE — 99999 PR PBB SHADOW E&M-EST. PATIENT-LVL III: CPT | Mod: PBBFAC,,, | Performed by: OTOLARYNGOLOGY

## 2017-11-06 PROCEDURE — 99499 UNLISTED E&M SERVICE: CPT | Mod: S$GLB,,, | Performed by: OTOLARYNGOLOGY

## 2017-11-06 PROCEDURE — 69210 REMOVE IMPACTED EAR WAX UNI: CPT | Mod: S$GLB,,, | Performed by: OTOLARYNGOLOGY

## 2017-11-06 NOTE — PATIENT INSTRUCTIONS
Cerumen impactions removed from both eacs  Consider sinus CT pending course re: lucy- OS pain/ nasal congestion sx  Audiometry on return ; RTC 6 months for ear cleaning  Continue use of nasal steroid spray with particular attention paid to the left nasal passage (2 sprays left nasal passage twice a day)

## 2017-11-06 NOTE — PROGRESS NOTES
Physical Therapy Progress Note     Name: Raffy Rutherford Jr.  Clinic Number: [id number]  Diagnosis:   Encounter Diagnoses   Name Primary?    Difficulty walking     Weakness      Physician: Jason Brewer, *  Treatment Orders: Therapeutic exercise  Past Medical History:   Diagnosis Date    Adenomatous polyp 2003    Adenomatous polyp     Allergy     Arthritis     Back pain     after trauma beginning in 195    Chronic pain     neck and left shoulder    Cluster headache 2013    Colon polyp     Degenerative disc disease     Depression     Diverticulitis 12/2013    Elevated PSA     Fibromyalgia 2013    GERD (gastroesophageal reflux disease)     Hepatitis 1970's    A    History of prostate biopsy 2002    Hyperlipidemia     Joint pain     Sleep apnea     Special screening for malignant neoplasms, colon 5/5/2014    Thyroid nodule 7/16/2014    Visual disturbance 2012    problems after cataract surgery       Precautions: standard    Evaluation Date: 11/1/17  Visit # authorized: 2/7  Authorization period: 12/31/17  Plan of care expiration: 1/20/17  Referring Provider: Jason Brewer, *    Subjective     Pt reports: he is doing well with exercises at home; denies increased pain only fatigue.    Pain Scale: before treatment: 2 currently; after treatment: n/a    Objective     ROM: before treatment: ; after treatment: NT    Patient received individual therapy to increase strength, endurance, ROM, flexibility, posture and core stabilization with 1 patients with activities as follows:     Therapeutic exercise: Raffy received therapeutic exercises to develop strength, endurance, ROM, flexibility, posture and core stabilization for 40 minutes including:     Supine:   · long sit DF with Ecu[place]orean estim: bilateral LEs for 10 min.  · bilateral piriformis stretch: 3x30 sec. bilaterally  · MET right ilial anterior/left posterior ilial rotation:  "3x5"  · sciatic nerve glide: 3x10 (10 rep increments)  · H.L. diaphragmatic breathing with tactile cuing for lateral costal expansion 2x8  · TA brace with ball squeeze: 2x8 with 3 sec. holds  · knee fall out with diaphragmatic breathing: 2x8  Sidelying:     Sitting:  · sit to stand at low ger with verbal/tactile cues for eccentric control  Standing:    Written Home Exercises Provided: review of current exercise regimen  Pt demo fair understanding of the education provided. Raffy demonstrated fair return demonstration of activities.     Pt. education:  · Posture reeducation, body mechanics, HEP,activity modification/avoidance  · No spiritual or educational barriers to learning provided  · Pt has no cultural, educational or language barriers to learning provided.    Assessment     Pt. required recurrent verbal/tactile cues for proper recruitment during exercises. Pt. only reported fatigue and not pain after treatment. Will continue to progress core hip lumbopelvic stability as spencer. Pt will continue to benefit from skilled outpatient physical therapy to address the remaining functional deficits, provide pt/family education, and to maximize pt's level of independence in the home and community environment.     Anticipated barriers to physical therapy: chronicity of condition and multiple comorbidities    · Pt's spiritual, cultural and educational needs considered and pt agreeable to plan of care and goals as stated below:   Medical necessity is demonstrated by the following IMPAIRMENTS/PROBLEM LIST:              1) Pain limiting function              2) Posture dysfunction              3) Core/Lumbar/LE weakness              4) Decreased thoracic/lumbar joint mobility              5) Decreased Lumbar ROM              6) Decreased soft tissue extensibility/fascia restriction              7) Decreased LE flexibility: bilateral hamstrings and right piriformis              8) Lack of HEP              9) LE paresthesia " bilaterally right greater than left     GOALS:   Short Term Goals:  4 weeks  1. Report decreased lumbar pain </= 5/10 at worst to increase tolerance for prolonged sitting  2. Pt. to demonstrate proper cervical and scapula retraction requiring min. to no verbal cues from PT  3. Pt. to demonstrate increased MMT for right DF to 3/5 to increase ability to clear toes during gait/stair negotiation.   4. Pt to tolerate HEP to improve ROM and independence with ADL's  5. Pt. to be independent with symptom management     Long Term Goals: 8 weeks  1. Report decreased lumbar pain </=  2/10 at worst to increase tolerance for prolonged sitting  2. Pt. to demonstrate proper cervical and scapula retraction requiring no verbal cues from PT  3. Pt. to demonstrate increased MMT for core/lumbar paraspinals to 3+/5 to increase endurance with prolonged sitting.   4. Pt. to demonstrate increased MMT for bilateral gluteus medius to 4+/5  to increase stability during ambulation on uneven surfaces.  5. Pt. to demonstrate increased MMT for bilateral DF to 3+/5 to decrease likelihood of falls during community ambulation.  7. Pt. to demonstrate increased MMT for left hip flexor to 4+/5 to increase tolerance for ADL and work activities.   8. Pt to be independent with HEP to improve ROM and independence with ADL's  9. Pt. to report a decrease in bilatera LE paresthesia by >/= 25% to improve ability to perform community ambulation     Plan   Continue with established Plan of Care towards PT goals.

## 2017-11-07 NOTE — PROGRESS NOTES
CC: Ear cleaning plus  HPI:Mr. Rutherford is a 67-year-old  gentleman who is here for an ear cleaning procedure.  He perceives some pressure in both ear canals presently.    His ears were last cleaned by me 6/12/17.  Audiometry of 11/23/16 indicated the patient's significant bilateral 3 through AK sloping sensorineural hearing loss.  SRT scores measured 5 dB for the right ear versus 10 for the left. Mr. Rutherford  complained of tinnitus worse in his right ear than his left at that time.  Tympanometry was within normal limits. Mr. Rutherford was considered a candidate for amplification at that time.  He had also complains of some left periorbital pain and nasal congestion symptoms.  He had indicated left nasal obstruction symptoms and daily headaches at that time.    Physical exam and indicated a polypoid appearance of the mucosa of the anterior left middle turbinate.  He has a history of depression treated properly ( with MAO inhibitor) by Dr. Amadou Dennis after failure of initial SSRI medication.    MRI brain scan performed in March 2016 indicated a partial fluid opacification of the right mastoid.  There was a mild degree of chronic microvascular ischemic change noted without any other significant pathology indicated.    Medical problem list includes depression, chronic pain and neck and left shoulder, adenomatous colon polyps, GERD, back pain after trauma, sleep apnea, visual disturbances, lumbago, fibromyalgia, acute prostatitis, compression fracture of T12 vertebra, cervicalgia, chronic migraine without aura, occipital neuralgia cervical radiculopathy, thyroid nodule, brow ptosis, status post lumbar spinal fusion procedure, gait abnormality among others    ALLERGIES: Coumadin, oxycodone, Alphagan    PE: Blood pressure 107/71 pulse 72 height 5 feet 8 inches weight 187 pounds  Gen.: Alert and oriented gentleman in no acute distress  Both ears were examined under the microscope in the micro-procedure room.  Cerumen is  extracted from each ear canal manually.  Both eardrums are intact and clear as visualized directly.  Nasal exam reveals a more patent right nasal passage.  There is no evidence of polypoid disease or purulent discharge left nasal passage.  Yan-Synephrine is sprayed left nasal passage.  Oral cavity is inspected; there is no significant ulceration inflammation or infection there.  Neck examination is within normal limits.      DIAGNOSIS:     ICD-10-CM ICD-9-CM    1. Bilateral impacted cerumen H61.23 380.4    2. History of hearing loss Z86.69 V12.49    3.      OS pain/left nasal congestion sx; polypoid appearance left middle turbinate  PLAN: Cerumen impactions removed from both eacs  Consider sinus CT pending course re: lucy-OS pain/ nasal congestion sx ( not ordered today)   Audiometry on return ; RTC 6 months for ear cleaning  Continue use of nasal steroid spray with particular attention paid to the left nasal passage (2 sprays to left nasal passage twice a day)

## 2017-11-08 ENCOUNTER — TELEPHONE (OUTPATIENT)
Dept: OPTOMETRY | Facility: CLINIC | Age: 67
End: 2017-11-08

## 2017-11-10 ENCOUNTER — OFFICE VISIT (OUTPATIENT)
Dept: OPHTHALMOLOGY | Facility: CLINIC | Age: 67
End: 2017-11-10
Payer: COMMERCIAL

## 2017-11-10 ENCOUNTER — CLINICAL SUPPORT (OUTPATIENT)
Dept: REHABILITATION | Facility: HOSPITAL | Age: 67
End: 2017-11-10
Attending: PSYCHIATRY & NEUROLOGY
Payer: COMMERCIAL

## 2017-11-10 DIAGNOSIS — R26.2 DIFFICULTY WALKING: ICD-10-CM

## 2017-11-10 DIAGNOSIS — R53.1 WEAKNESS: ICD-10-CM

## 2017-11-10 DIAGNOSIS — H18.452 SALZMANN'S NODULAR DEGENERATION OF CORNEA OF LEFT EYE: ICD-10-CM

## 2017-11-10 PROCEDURE — 92012 INTRM OPH EXAM EST PATIENT: CPT | Mod: S$GLB,,, | Performed by: OPHTHALMOLOGY

## 2017-11-10 PROCEDURE — 97110 THERAPEUTIC EXERCISES: CPT

## 2017-11-10 PROCEDURE — 99999 PR PBB SHADOW E&M-EST. PATIENT-LVL III: CPT | Mod: PBBFAC,,, | Performed by: OPHTHALMOLOGY

## 2017-11-10 NOTE — PROGRESS NOTES
HPI     Triage/meliton pt  Patient experiencing eye pain in corner of the OS(nasally) past several   months which getting frequent.  S/p superficial keratectomy OS 8/18/17  - for RES     Eye Drops:Refresh daily OU    Last edited by Bebe Calderon MA on 11/10/2017 10:59 AM. (History)            Assessment /Plan     For exam results, see Encounter Report.    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 Harman's note. Would recommend trial of contact lens. To Dr. Harman for fitting.

## 2017-11-10 NOTE — PROGRESS NOTES
Physical Therapy Progress Note     Name: Raffy Rutherford Jr.  Clinic Number: [id number]  Diagnosis:   Encounter Diagnoses   Name Primary?    Difficulty walking     Weakness      Physician: Jason Brewer, *  Treatment Orders: Therapeutic exercise  Past Medical History:   Diagnosis Date    Adenomatous polyp 2003    Adenomatous polyp     Allergy     Arthritis     Back pain     after trauma beginning in 195    Chronic pain     neck and left shoulder    Cluster headache 2013    Colon polyp     Degenerative disc disease     Depression     Diverticulitis 12/2013    Elevated PSA     Fibromyalgia 2013    GERD (gastroesophageal reflux disease)     Hepatitis 1970's    A    History of prostate biopsy 2002    Hyperlipidemia     Joint pain     Sleep apnea     Special screening for malignant neoplasms, colon 5/5/2014    Thyroid nodule 7/16/2014    Visual disturbance 2012    problems after cataract surgery       Precautions: standard    Evaluation Date: 11/1/17  Visit # authorized: 3/7  Authorization period: 12/31/17  Plan of care expiration: 1/20/17  Referring Provider: Jason Brewer, *    Subjective     Pt reports: he is doing well; noted feeling pretty tired the other day, but fine today.    Pain Scale: before treatment: 2 currently; after treatment: n/a    Objective     Time in: 9  Time out: 10:15  40 min. 1:1    Patient received individual therapy to increase strength, endurance, ROM, flexibility, posture and core stabilization with 1 patients with activities as follows:     Therapeutic exercise: Raffy received therapeutic exercises to develop strength, endurance, ROM, flexibility, posture and core stabilization for 40 minutes including:     Supine:   · long sit DF with [place]ran estim: bilateral LEs for 10 min.  · bilateral piriformis stretch (pretzel stretch): 3x30 sec. bilaterally  · MET right ilial anterior/left posterior ilial rotation:  "3x5"  · sciatic nerve glide: 3x10 (10 rep increments)  · H.L. diaphragmatic breathing with tactile cuing for lateral costal expansion 2x9  · TA brace with ball squeeze: 2x9 with 3 sec. holds  · knee fall out with diaphragmatic breathing: 2x9  Sidelying:     Sitting:  · sit to stand at low ger with verbal/tactile cues for eccentric control: 2x8  Standing:  · Parallel bars: ambulation fwd/backwards: 3 laps  · Parallel bars: side walking with YTB at knees: 3 laps    Written Home Exercises Provided: review of current exercise regimen  Pt demo fair understanding of the education provided. Raffy demonstrated fair return demonstration of activities.     Pt. education:  · Posture reeducation, body mechanics, HEP,activity modification/avoidance  · No spiritual or educational barriers to learning provided  · Pt has no cultural, educational or language barriers to learning provided.    Assessment     Pt. had good tolerance to exercise advancement. Pt. ambulated with improved gait mechanics while wearing orthotics in bilateral shoes. Pt. reported feeling of bilateral LE sensation at a lower intensity today's session. Will continue to progress core hip lumbopelvic stability as spencer. Pt will continue to benefit from skilled outpatient physical therapy to address the remaining functional deficits, provide pt/family education, and to maximize pt's level of independence in the home and community environment.     Anticipated barriers to physical therapy: chronicity of condition and multiple comorbidities    · Pt's spiritual, cultural and educational needs considered and pt agreeable to plan of care and goals as stated below:   Medical necessity is demonstrated by the following IMPAIRMENTS/PROBLEM LIST:              1) Pain limiting function              2) Posture dysfunction              3) Core/Lumbar/LE weakness              4) Decreased thoracic/lumbar joint mobility              5) Decreased Lumbar ROM              6) Decreased " soft tissue extensibility/fascia restriction              7) Decreased LE flexibility: bilateral hamstrings and right piriformis              8) Lack of HEP              9) LE paresthesia bilaterally right greater than left     GOALS:   Short Term Goals:  4 weeks  1. Report decreased lumbar pain </= 5/10 at worst to increase tolerance for prolonged sitting  2. Pt. to demonstrate proper cervical and scapula retraction requiring min. to no verbal cues from PT  3. Pt. to demonstrate increased MMT for right DF to 3/5 to increase ability to clear toes during gait/stair negotiation.   4. Pt to tolerate HEP to improve ROM and independence with ADL's  5. Pt. to be independent with symptom management     Long Term Goals: 8 weeks  1. Report decreased lumbar pain </=  2/10 at worst to increase tolerance for prolonged sitting  2. Pt. to demonstrate proper cervical and scapula retraction requiring no verbal cues from PT  3. Pt. to demonstrate increased MMT for core/lumbar paraspinals to 3+/5 to increase endurance with prolonged sitting.   4. Pt. to demonstrate increased MMT for bilateral gluteus medius to 4+/5  to increase stability during ambulation on uneven surfaces.  5. Pt. to demonstrate increased MMT for bilateral DF to 3+/5 to decrease likelihood of falls during community ambulation.  7. Pt. to demonstrate increased MMT for left hip flexor to 4+/5 to increase tolerance for ADL and work activities.   8. Pt to be independent with HEP to improve ROM and independence with ADL's  9. Pt. to report a decrease in bilatera LE paresthesia by >/= 25% to improve ability to perform community ambulation     Plan   Continue with established Plan of Care towards PT goals.

## 2017-11-13 ENCOUNTER — CLINICAL SUPPORT (OUTPATIENT)
Dept: REHABILITATION | Facility: HOSPITAL | Age: 67
End: 2017-11-13
Attending: PSYCHIATRY & NEUROLOGY
Payer: COMMERCIAL

## 2017-11-13 DIAGNOSIS — R53.1 WEAKNESS: ICD-10-CM

## 2017-11-13 DIAGNOSIS — R26.2 DIFFICULTY WALKING: ICD-10-CM

## 2017-11-13 PROCEDURE — 97110 THERAPEUTIC EXERCISES: CPT

## 2017-11-13 PROCEDURE — 97014 ELECTRIC STIMULATION THERAPY: CPT

## 2017-11-13 NOTE — PROGRESS NOTES
Physical Therapy Progress Note     Name: Raffy Rutherford Jr.  Clinic Number: [id number]  Diagnosis:   Encounter Diagnoses   Name Primary?    Difficulty walking     Weakness      Physician: Jason Brewer, *  Treatment Orders: Therapeutic exercise  Past Medical History:   Diagnosis Date    Adenomatous polyp 2003    Adenomatous polyp     Allergy     Arthritis     Back pain     after trauma beginning in 195    Chronic pain     neck and left shoulder    Cluster headache 2013    Colon polyp     Degenerative disc disease     Depression     Diverticulitis 12/2013    Elevated PSA     Fibromyalgia 2013    GERD (gastroesophageal reflux disease)     Hepatitis 1970's    A    History of prostate biopsy 2002    Hyperlipidemia     Joint pain     Sleep apnea     Special screening for malignant neoplasms, colon 5/5/2014    Thyroid nodule 7/16/2014    Visual disturbance 2012    problems after cataract surgery       Precautions: standard; Status post posterior spinal fusion, anterior body disk spaces surgery L3 L4 and L4-5 levels.    Evaluation Date: 11/1/17  Visit # authorized: 4/7  Authorization period: 12/31/17  Plan of care expiration: 1/20/17  Referring Provider: Jason Brewer, *    Subjective     Pt reports: he is doing well; noted feeling pretty tired the other day felt like his legs were giving out more often; but fine today.    Pain Scale: before treatment: 2 currently; after treatment: n/a    Objective     Time in: 2  Time out: 3:15  60 min. 1:1    Patient received individual therapy to increase strength, endurance, ROM, flexibility, posture and core stabilization with 1 patients with activities as follows:     Therapeutic exercise: Raffy received therapeutic exercises to develop strength, endurance, ROM, flexibility, posture and core stabilization for 40 minutes including:     warm-up:  · TM walking with verbal/tactile cues for proper  "gait mechanics: .8 mph for 5 minutes  Supine:   · long sit DF with Argentine estim: bilateral LEs for 10 min.  · right piriformis stretch (pretzel stretch) and left piriformis stretch pushing away: 3x45 sec. bilaterally  · MET right ilial anterior/left posterior ilial rotation: 3x5"  · TA brace with heel slides: 3x10 bilaterally  · sciatic nerve glide: 3x10 (10 rep increments)  · TA brace with ball squeeze: 2x10 with 3 sec. holds  · knee fall out with diaphragmatic breathing: 2x10  · bridge with TA recruitment: 1x10 with tactile/verbal cues to isolate TA  Sidelying:     Sitting:  · sit to stand from chair with verbal/tactile cues for eccentric control: 2x8  Standing:  · Parallel bars: ambulation fwd/backwards: 3 laps NP  · Parallel bars: side walking with YTB at knees: 3 laps NP    Written Home Exercises Provided: review of current exercise regimen  Pt demo fair understanding of the education provided. Raffy demonstrated fair return demonstration of activities.     Pt. education:  · Posture reeducation, body mechanics, HEP,activity modification/avoidance  · No spiritual or educational barriers to learning provided  · Pt has no cultural, educational or language barriers to learning provided.    Assessment     Pt. noting feeling more fatigued with slower pace on TM as pt. was not able to compensate quite as much and had to use muscles more to propel himself forward. No LOB and/or tripping occurred; pt. should be safe to perform on his own as long as he slows sudeep to focus on proper gait mechanics. Improved TA awareness/recruitment with exercise after recurrent verbal/tactile cues to achieve pelvic neutral.     Will continue to progress core hip lumbopelvic stability as spencer. Pt will continue to benefit from skilled outpatient physical therapy to address the remaining functional deficits, provide pt/family education, and to maximize pt's level of independence in the home and community environment.     Anticipated " barriers to physical therapy: chronicity of condition and multiple comorbidities    · Pt's spiritual, cultural and educational needs considered and pt agreeable to plan of care and goals as stated below:   Medical necessity is demonstrated by the following IMPAIRMENTS/PROBLEM LIST:              1) Pain limiting function              2) Posture dysfunction              3) Core/Lumbar/LE weakness              4) Decreased thoracic/lumbar joint mobility              5) Decreased Lumbar ROM              6) Decreased soft tissue extensibility/fascia restriction              7) Decreased LE flexibility: bilateral hamstrings and right piriformis              8) Lack of HEP              9) LE paresthesia bilaterally right greater than left     GOALS:   Short Term Goals:  4 weeks  1. Report decreased lumbar pain </= 5/10 at worst to increase tolerance for prolonged sitting  2. Pt. to demonstrate proper cervical and scapula retraction requiring min. to no verbal cues from PT  3. Pt. to demonstrate increased MMT for right DF to 3/5 to increase ability to clear toes during gait/stair negotiation.   4. Pt to tolerate HEP to improve ROM and independence with ADL's  5. Pt. to be independent with symptom management     Long Term Goals: 8 weeks  1. Report decreased lumbar pain </=  2/10 at worst to increase tolerance for prolonged sitting  2. Pt. to demonstrate proper cervical and scapula retraction requiring no verbal cues from PT  3. Pt. to demonstrate increased MMT for core/lumbar paraspinals to 3+/5 to increase endurance with prolonged sitting.   4. Pt. to demonstrate increased MMT for bilateral gluteus medius to 4+/5  to increase stability during ambulation on uneven surfaces.  5. Pt. to demonstrate increased MMT for bilateral DF to 3+/5 to decrease likelihood of falls during community ambulation.  7. Pt. to demonstrate increased MMT for left hip flexor to 4+/5 to increase tolerance for ADL and work activities.   8. Pt to be  independent with HEP to improve ROM and independence with ADL's  9. Pt. to report a decrease in bilatera LE paresthesia by >/= 25% to improve ability to perform community ambulation     Plan   Continue with established Plan of Care towards PT goals.

## 2017-11-17 ENCOUNTER — CLINICAL SUPPORT (OUTPATIENT)
Dept: REHABILITATION | Facility: HOSPITAL | Age: 67
End: 2017-11-17
Attending: PSYCHIATRY & NEUROLOGY
Payer: COMMERCIAL

## 2017-11-17 DIAGNOSIS — R26.2 DIFFICULTY WALKING: ICD-10-CM

## 2017-11-17 DIAGNOSIS — R53.1 WEAKNESS: ICD-10-CM

## 2017-11-17 PROCEDURE — 97110 THERAPEUTIC EXERCISES: CPT

## 2017-11-17 NOTE — PROGRESS NOTES
Physical Therapy Progress Note     Name: Raffy Rutherford Jr.  Clinic Number: [id number]  Diagnosis:   Encounter Diagnoses   Name Primary?    Difficulty walking     Weakness      Physician: Jason Brewer, *  Treatment Orders: Therapeutic exercise  Past Medical History:   Diagnosis Date    Adenomatous polyp 2003    Adenomatous polyp     Allergy     Arthritis     Back pain     after trauma beginning in 195    Chronic pain     neck and left shoulder    Cluster headache 2013    Colon polyp     Degenerative disc disease     Depression     Diverticulitis 12/2013    Elevated PSA     Fibromyalgia 2013    GERD (gastroesophageal reflux disease)     Hepatitis 1970's    A    History of prostate biopsy 2002    Hyperlipidemia     Joint pain     Sleep apnea     Special screening for malignant neoplasms, colon 5/5/2014    Thyroid nodule 7/16/2014    Visual disturbance 2012    problems after cataract surgery       Precautions: standard; Status post posterior spinal fusion, anterior body disk spaces surgery L3 L4 and L4-5 levels.    Evaluation Date: 11/1/17  Visit # authorized: 4/7  Authorization period: 12/31/17  Plan of care expiration: 1/20/17  Referring Provider: Jason Brewer, *    Subjective     Pt reports: he is doing well; noted feeling pretty tired the other day felt like his legs were giving out more often; but fine today.    Pain Scale: before treatment: 2 currently; after treatment: n/a    Objective     Time in: 9  Time out: 10:30  60 min. 1:1    Patient received individual therapy to increase strength, endurance, ROM, flexibility, posture and core stabilization with 1 patients with activities as follows:     Therapeutic exercise: Raffy received therapeutic exercises to develop strength, endurance, ROM, flexibility, posture and core stabilization for 55 minutes including:     warm-up:  · TM walking with verbal/tactile cues for proper  "gait mechanics: .8 mph for 5 minutes  Supine:   · long sit DF with [place]inese estim: bilateral LEs for 10 min.  · right piriformis stretch (pretzel stretch) and left piriformis stretch pushing away: 3x45 sec. bilaterally  · MET right ilial anterior/left posterior ilial rotation: 3x5"  · TA brace with heel slides: 3x10 bilaterally  · sciatic nerve glide: 3x10 (10 rep increments)  · TA brace with ball squeeze: 2x10 with 3 sec. holds  · knee fall out with diaphragmatic breathing: 3x8  · bridge with TA recruitment: 2x8 with tactile/verbal cues to isolate TA  Sidelying:     Sitting:  · sit to stand from chair with verbal/tactile cues for eccentric control: 2x9  Standing:  · Parallel bars: ambulation fwd/backwards: 3 laps NP  · Parallel bars: side walking with YTB at knees: 3 laps NP    Written Home Exercises Provided: review of current exercise regimen  Pt demo fair understanding of the education provided. Raffy demonstrated fair return demonstration of activities.     Pt. education:  · Posture reeducation, body mechanics, HEP,activity modification/avoidance  · No spiritual or educational barriers to learning provided  · Pt has no cultural, educational or language barriers to learning provided.    Assessment     Pt. still required recurrent verbal/tactile cues for proper TA recruitment. Pt. was especially challenged with proper sequencing of TA before limb movement. Pt. was given an updated HEP.    Will continue to progress core hip lumbopelvic stability as spencer. Pt will continue to benefit from skilled outpatient physical therapy to address the remaining functional deficits, provide pt/family education, and to maximize pt's level of independence in the home and community environment.     Anticipated barriers to physical therapy: chronicity of condition and multiple comorbidities    · Pt's spiritual, cultural and educational needs considered and pt agreeable to plan of care and goals as stated below:   Medical necessity is " demonstrated by the following IMPAIRMENTS/PROBLEM LIST:              1) Pain limiting function              2) Posture dysfunction              3) Core/Lumbar/LE weakness              4) Decreased thoracic/lumbar joint mobility              5) Decreased Lumbar ROM              6) Decreased soft tissue extensibility/fascia restriction              7) Decreased LE flexibility: bilateral hamstrings and right piriformis              8) Lack of HEP              9) LE paresthesia bilaterally right greater than left     GOALS:   Short Term Goals:  4 weeks  1. Report decreased lumbar pain </= 5/10 at worst to increase tolerance for prolonged sitting  2. Pt. to demonstrate proper cervical and scapula retraction requiring min. to no verbal cues from PT  3. Pt. to demonstrate increased MMT for right DF to 3/5 to increase ability to clear toes during gait/stair negotiation.   4. Pt to tolerate HEP to improve ROM and independence with ADL's  5. Pt. to be independent with symptom management     Long Term Goals: 8 weeks  1. Report decreased lumbar pain </=  2/10 at worst to increase tolerance for prolonged sitting  2. Pt. to demonstrate proper cervical and scapula retraction requiring no verbal cues from PT  3. Pt. to demonstrate increased MMT for core/lumbar paraspinals to 3+/5 to increase endurance with prolonged sitting.   4. Pt. to demonstrate increased MMT for bilateral gluteus medius to 4+/5  to increase stability during ambulation on uneven surfaces.  5. Pt. to demonstrate increased MMT for bilateral DF to 3+/5 to decrease likelihood of falls during community ambulation.  7. Pt. to demonstrate increased MMT for left hip flexor to 4+/5 to increase tolerance for ADL and work activities.   8. Pt to be independent with HEP to improve ROM and independence with ADL's  9. Pt. to report a decrease in bilatera LE paresthesia by >/= 25% to improve ability to perform community ambulation     Plan   Continue with established Plan of  Care towards PT goals.

## 2017-11-27 ENCOUNTER — CLINICAL SUPPORT (OUTPATIENT)
Dept: REHABILITATION | Facility: HOSPITAL | Age: 67
End: 2017-11-27
Attending: PSYCHIATRY & NEUROLOGY
Payer: COMMERCIAL

## 2017-11-27 DIAGNOSIS — R26.2 DIFFICULTY WALKING: ICD-10-CM

## 2017-11-27 DIAGNOSIS — R53.1 WEAKNESS: ICD-10-CM

## 2017-11-27 PROCEDURE — 97110 THERAPEUTIC EXERCISES: CPT

## 2017-11-27 NOTE — PROGRESS NOTES
Physical Therapy Progress Note     Name: Raffy Rutherford Jr.  Clinic Number: [id number]  Diagnosis:   Encounter Diagnoses   Name Primary?    Difficulty walking     Weakness      Physician: Jason Brewer, *  Treatment Orders: Therapeutic exercise  Past Medical History:   Diagnosis Date    Adenomatous polyp 2003    Adenomatous polyp     Allergy     Arthritis     Back pain     after trauma beginning in 195    Chronic pain     neck and left shoulder    Cluster headache 2013    Colon polyp     Degenerative disc disease     Depression     Diverticulitis 12/2013    Elevated PSA     Fibromyalgia 2013    GERD (gastroesophageal reflux disease)     Hepatitis 1970's    A    History of prostate biopsy 2002    Hyperlipidemia     Joint pain     Sleep apnea     Special screening for malignant neoplasms, colon 5/5/2014    Thyroid nodule 7/16/2014    Visual disturbance 2012    problems after cataract surgery       Precautions: standard; Status post posterior spinal fusion, anterior body disk spaces surgery L3 L4 and L4-5 levels.    Evaluation Date: 11/1/17  Visit # authorized: 5/7  Authorization period: 12/31/17  Plan of care expiration: 1/20/17  Referring Provider: Jason Brewer, *    Subjective     Pt reports: he is doing well; noted feeling pretty tired the other day felt like his legs were giving out more often; but fine today.    Pain Scale: before treatment: 2 currently; after treatment: n/a    Objective     Time in: 10  Time out: 11:30  45 min. 1:1    Patient received individual therapy to increase strength, endurance, ROM, flexibility, posture and core stabilization with 1 patients with activities as follows:     Therapeutic exercise: Raffy received therapeutic exercises to develop strength, endurance, ROM, flexibility, posture and core stabilization for 55 minutes including:     warm-up:  · TM walking with verbal/tactile cues for proper  "gait mechanics: .8 mph for 8 minutes  Supine:   · long sit DF with [place] citizen estim: bilateral LEs for 10 min.  · right piriformis stretch (pretzel stretch) and left piriformis stretch pushing away: 3x45 sec. bilaterally  · MET right ilial anterior/left posterior ilial rotation: 3x5"  · TA brace with heel slides: 3x10 bilaterally  · sciatic nerve glide: 3x10 (10 rep increments)  · TA brace with ball squeeze: 3x8 with 3 sec. holds  · knee fall out with diaphragmatic breathing: 3x10  · bridge with TA recruitment: 2x9 with 5 sec. holds and tactile/verbal cues to isolate TA  Sidelying:   · s/l hip abduction: 2x8 with 3 sec. holds bilaterally with verbal/tactile cues  Sitting:  · sit to stand from chair with verbal/tactile cues for eccentric control: 2x10  Standing:  · walking fwd/backwards 3 laps in parallel bars    Written Home Exercises Provided: review of current exercise regimen  Pt demo fair understanding of the education provided. Raffy demonstrated fair return demonstration of activities.     Pt. education:  · Posture reeducation, body mechanics, HEP,activity modification/avoidance  · No spiritual or educational barriers to learning provided  · Pt has no cultural, educational or language barriers to learning provided.    Assessment     Pt. had improved exercise tolerance/endurance denying increased pain. Improved exercise recall requiring less verbal/tactile cues for proper TA recruitment. Pt. agreed with plan to only attend one appointment this week and one next week to wean off of PT treatment. Will continue to progress strength as tolerable.    Will continue to progress core hip lumbopelvic stability as spencer. Pt will continue to benefit from skilled outpatient physical therapy to address the remaining functional deficits, provide pt/family education, and to maximize pt's level of independence in the home and community environment.     Anticipated barriers to physical therapy: chronicity of condition and multiple " comorbidities    · Pt's spiritual, cultural and educational needs considered and pt agreeable to plan of care and goals as stated below:   Medical necessity is demonstrated by the following IMPAIRMENTS/PROBLEM LIST:              1) Pain limiting function              2) Posture dysfunction              3) Core/Lumbar/LE weakness              4) Decreased thoracic/lumbar joint mobility              5) Decreased Lumbar ROM              6) Decreased soft tissue extensibility/fascia restriction              7) Decreased LE flexibility: bilateral hamstrings and right piriformis              8) Lack of HEP              9) LE paresthesia bilaterally right greater than left     GOALS:   Short Term Goals:  4 weeks  1. Report decreased lumbar pain </= 5/10 at worst to increase tolerance for prolonged sitting  2. Pt. to demonstrate proper cervical and scapula retraction requiring min. to no verbal cues from PT  3. Pt. to demonstrate increased MMT for right DF to 3/5 to increase ability to clear toes during gait/stair negotiation.   4. Pt to tolerate HEP to improve ROM and independence with ADL's  5. Pt. to be independent with symptom management     Long Term Goals: 8 weeks  1. Report decreased lumbar pain </=  2/10 at worst to increase tolerance for prolonged sitting  2. Pt. to demonstrate proper cervical and scapula retraction requiring no verbal cues from PT  3. Pt. to demonstrate increased MMT for core/lumbar paraspinals to 3+/5 to increase endurance with prolonged sitting.   4. Pt. to demonstrate increased MMT for bilateral gluteus medius to 4+/5  to increase stability during ambulation on uneven surfaces.  5. Pt. to demonstrate increased MMT for bilateral DF to 3+/5 to decrease likelihood of falls during community ambulation.  7. Pt. to demonstrate increased MMT for left hip flexor to 4+/5 to increase tolerance for ADL and work activities.   8. Pt to be independent with HEP to improve ROM and independence with ADL's  9.  Pt. to report a decrease in bilatera LE paresthesia by >/= 25% to improve ability to perform community ambulation     Plan   Continue with established Plan of Care towards PT goals.

## 2017-11-30 ENCOUNTER — PATIENT MESSAGE (OUTPATIENT)
Dept: OPHTHALMOLOGY | Facility: CLINIC | Age: 67
End: 2017-11-30

## 2017-12-11 ENCOUNTER — PROCEDURE VISIT (OUTPATIENT)
Dept: OPHTHALMOLOGY | Facility: CLINIC | Age: 67
End: 2017-12-11
Payer: COMMERCIAL

## 2017-12-11 VITALS — SYSTOLIC BLOOD PRESSURE: 115 MMHG | HEART RATE: 61 BPM | DIASTOLIC BLOOD PRESSURE: 78 MMHG

## 2017-12-11 DIAGNOSIS — H57.12 EYE PAIN, LEFT: ICD-10-CM

## 2017-12-11 DIAGNOSIS — H18.452 SALZMANN'S NODULAR DEGENERATION OF CORNEA OF LEFT EYE: Primary | ICD-10-CM

## 2017-12-11 DIAGNOSIS — H18.529 ANTERIOR BASEMENT MEMBRANE DYSTROPHY: ICD-10-CM

## 2017-12-11 PROCEDURE — 92014 COMPRE OPH EXAM EST PT 1/>: CPT | Mod: 25,S$GLB,, | Performed by: OPHTHALMOLOGY

## 2017-12-11 PROCEDURE — 65400 REMOVAL OF EYE LESION: CPT | Mod: LT,S$GLB,, | Performed by: OPHTHALMOLOGY

## 2017-12-11 RX ORDER — OXYCODONE AND ACETAMINOPHEN 7.5; 325 MG/1; MG/1
1 TABLET ORAL EVERY 4 HOURS PRN
Qty: 15 TABLET | Refills: 0 | Status: SHIPPED | OUTPATIENT
Start: 2017-12-11 | End: 2018-02-02

## 2017-12-11 RX ORDER — OFLOXACIN 3 MG/ML
1 SOLUTION/ DROPS OPHTHALMIC 3 TIMES DAILY
Qty: 5 ML | Refills: 1 | Status: SHIPPED | OUTPATIENT
Start: 2017-12-11 | End: 2018-01-03

## 2017-12-11 NOTE — PROGRESS NOTES
HPI     Eye Problem    Additional comments: here for procedure  os           Comments   Referred by Dr Ocampo     S/p superficial keratectomy OS 8/18/17  - for RES   Anterior basement membrane dystrophy  Salzmann's nodular deg.os  AT prn    Feels like he is having a headache in his left eye.       Last edited by Melanie Sequeira MD on 12/11/2017 10:48 AM. (History)            Assessment /Plan     For exam results, see Encounter Report.    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  Dispense: 5 mL; Refill: 1  -     oxyCODONE-acetaminophen (PERCOCET) 7.5-325 mg per tablet; Take 1 tablet by mouth every 4 (four) hours as needed for Pain.  Dispense: 15 tablet; Refill: 0      SURGEON: Melanie Sequeira M.D.     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 After a thorough discussion of risks, benefits, and alternatives, it was agreed to proceed with surgical removal of corneal epithelium to attempt visual rehabilitation and improve activities of daily living which have suffered due to the impaired visual status.     PROCEDURE IN DETAIL   The patient was placed in a supine position on the procedure table while the eye was prepped and draped in standard sterile fashion with 5% Betadine. A lid speculum was placed and the procedure begun by debridement of the corneal epithelium with a Hickory blade, which was also used to remove as much of the diseased cornea as could safely  and easily be accomplished.     Antibiotic drops were placed on the eye, and a bandage contact lens applied.     The patient will continue with antibiotic and anti-inflammatory treatment, and be followed up in the eye clinic in 1 week.     va OS only, gk to remove BCL  '

## 2017-12-13 ENCOUNTER — PATIENT MESSAGE (OUTPATIENT)
Dept: OPHTHALMOLOGY | Facility: CLINIC | Age: 67
End: 2017-12-13

## 2017-12-14 ENCOUNTER — TELEPHONE (OUTPATIENT)
Dept: OPHTHALMOLOGY | Facility: CLINIC | Age: 67
End: 2017-12-14

## 2017-12-14 RX ORDER — POLYMYXIN B SULFATE AND TRIMETHOPRIM 1; 10000 MG/ML; [USP'U]/ML
1 SOLUTION OPHTHALMIC 3 TIMES DAILY
Qty: 10 ML | Refills: 1 | Status: SHIPPED | OUTPATIENT
Start: 2017-12-14 | End: 2018-01-03

## 2018-01-03 ENCOUNTER — OFFICE VISIT (OUTPATIENT)
Dept: OPHTHALMOLOGY | Facility: CLINIC | Age: 68
End: 2018-01-03
Payer: COMMERCIAL

## 2018-01-03 DIAGNOSIS — H57.12 EYE PAIN, LEFT: ICD-10-CM

## 2018-01-03 DIAGNOSIS — H18.529 ANTERIOR BASEMENT MEMBRANE DYSTROPHY: ICD-10-CM

## 2018-01-03 DIAGNOSIS — H18.452 SALZMANN'S NODULAR DEGENERATION OF CORNEA OF LEFT EYE: Primary | ICD-10-CM

## 2018-01-03 PROCEDURE — 99999 PR PBB SHADOW E&M-EST. PATIENT-LVL III: CPT | Mod: PBBFAC,,, | Performed by: OPHTHALMOLOGY

## 2018-01-03 PROCEDURE — 99024 POSTOP FOLLOW-UP VISIT: CPT | Mod: S$GLB,,, | Performed by: OPHTHALMOLOGY

## 2018-01-04 NOTE — PROGRESS NOTES
HPI     Referred by Dr Ocampo     S/p repeat uperficial keratectomy OS 12/11/17 - for salzmanns  S/p superficial keratectomy OS 8/18/17  - for RES   Anterior basement membrane dystrophy  Salzmann's nodular deg.os  AT prn    Pt here for post op check OS. Pt states pain OS (7 on scale).  Pt hasn't   been using eyedrops. Pt states BCL still in place OS.         Last edited by Melanie Sequeira MD on 1/4/2018  8:35 AM. (History)            Assessment /Plan     For exam results, see Encounter Report.    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 Ocampo 6 mo.

## 2018-01-10 DIAGNOSIS — K21.9 GASTROESOPHAGEAL REFLUX DISEASE, ESOPHAGITIS PRESENCE NOT SPECIFIED: ICD-10-CM

## 2018-01-11 RX ORDER — RABEPRAZOLE SODIUM 20 MG/1
TABLET, DELAYED RELEASE ORAL
Qty: 180 TABLET | Refills: 3 | Status: SHIPPED | OUTPATIENT
Start: 2018-01-11 | End: 2018-03-12 | Stop reason: SDUPTHER

## 2018-01-25 ENCOUNTER — OFFICE VISIT (OUTPATIENT)
Dept: UROLOGY | Facility: CLINIC | Age: 68
End: 2018-01-25
Payer: COMMERCIAL

## 2018-01-25 ENCOUNTER — LAB VISIT (OUTPATIENT)
Dept: LAB | Facility: HOSPITAL | Age: 68
End: 2018-01-25
Payer: COMMERCIAL

## 2018-01-25 VITALS
HEIGHT: 69 IN | WEIGHT: 181.69 LBS | DIASTOLIC BLOOD PRESSURE: 66 MMHG | HEART RATE: 85 BPM | SYSTOLIC BLOOD PRESSURE: 114 MMHG | BODY MASS INDEX: 26.91 KG/M2

## 2018-01-25 DIAGNOSIS — R10.32 BILATERAL GROIN PAIN: ICD-10-CM

## 2018-01-25 DIAGNOSIS — N40.1 BPH WITH URINARY OBSTRUCTION: ICD-10-CM

## 2018-01-25 DIAGNOSIS — N13.8 BPH WITH URINARY OBSTRUCTION: ICD-10-CM

## 2018-01-25 DIAGNOSIS — N40.1 BPH WITH URINARY OBSTRUCTION: Primary | ICD-10-CM

## 2018-01-25 DIAGNOSIS — R10.31 BILATERAL GROIN PAIN: ICD-10-CM

## 2018-01-25 DIAGNOSIS — N13.8 BPH WITH URINARY OBSTRUCTION: Primary | ICD-10-CM

## 2018-01-25 DIAGNOSIS — R97.20 ELEVATED PSA: ICD-10-CM

## 2018-01-25 LAB
BILIRUB SERPL-MCNC: NORMAL MG/DL
BLOOD URINE, POC: NORMAL
COLOR, POC UA: YELLOW
COMPLEXED PSA SERPL-MCNC: 5.3 NG/ML
GLUCOSE UR QL STRIP: NORMAL
KETONES UR QL STRIP: NORMAL
LEUKOCYTE ESTERASE URINE, POC: NORMAL
NITRITE, POC UA: NORMAL
PH, POC UA: 7
POC RESIDUAL URINE VOLUME: 20 ML (ref 0–100)
PROTEIN, POC: NORMAL
SPECIFIC GRAVITY, POC UA: 1.01
UROBILINOGEN, POC UA: NORMAL

## 2018-01-25 PROCEDURE — 51798 US URINE CAPACITY MEASURE: CPT | Mod: S$GLB,,, | Performed by: NURSE PRACTITIONER

## 2018-01-25 PROCEDURE — 36415 COLL VENOUS BLD VENIPUNCTURE: CPT

## 2018-01-25 PROCEDURE — 84153 ASSAY OF PSA TOTAL: CPT

## 2018-01-25 PROCEDURE — 99214 OFFICE O/P EST MOD 30 MIN: CPT | Mod: 25,S$GLB,, | Performed by: NURSE PRACTITIONER

## 2018-01-25 PROCEDURE — 99999 PR PBB SHADOW E&M-EST. PATIENT-LVL IV: CPT | Mod: PBBFAC,,, | Performed by: NURSE PRACTITIONER

## 2018-01-25 PROCEDURE — 81002 URINALYSIS NONAUTO W/O SCOPE: CPT | Mod: S$GLB,,, | Performed by: NURSE PRACTITIONER

## 2018-01-25 RX ORDER — MELOXICAM 15 MG/1
15 TABLET ORAL DAILY
Qty: 30 TABLET | Refills: 3 | Status: SHIPPED | OUTPATIENT
Start: 2018-01-25 | End: 2018-02-02

## 2018-01-25 NOTE — PROGRESS NOTES
Subjective:       Patient ID: Raffy Rutherford Jr. is a 67 y.o. male.    Chief Complaint: Groin Pain and Benign Prostatic Hypertrophy    Raffy Rutherford Jr. is a 67 y.o. Male with history of BPH, prostatitis and elevated PSA  Unable to tolerate alpha blockers.  (-) Trus/bx 1/117/2011 with Dr. El. PSA was 5.3 at the time.    Last clinic visit 01/25/2017 02/22/2017 MRI of the Prostate when the PSA was 7.2  -Peripheral Zone: PI RADS grade 2  -Transitional Zone: PI RADS grade 2  -Prostate size 36 ml      Here today with concerns for possible prostatitis.   He having some groin pain.  No voices no urinary complaints.  FOS is not as strong as before.  No bright red blood;   No dysuria; scrotal pain  Nocturia 0-1                    PSA                  7.2 (H)             02/08/2017                 PSA                  5.9 (H)             01/23/2017                 PSA                  6.1 (H)             01/16/2017             PSA                  4.3*                04/28/2015                 PSA                  4.6*                02/25/2015                 PSA                      4.5*                02/21/2014                 PSA                      4.42*               05/06/2013                 PSA                      5.47*               10/04/2012                 PSA                      5.3*                01/03/2011                 PSA                      4.2*                09/27/2010                 PSA                      5.2*                08/23/2010                 PSA                      3.4                 07/21/2010                 PSA                      3.9                 05/10/2010                 PSA                      3.4                 04/22/2009                 PSA                      2.1                 10/16/2007                                Past Medical History:    Depression                                                    Hyperlipidemia                                                 Chronic pain                                                    Comment:neck and left shoulder    Colon polyp                                                   Adenomatous polyp                               2003          History of prostate biopsy                      2002          GERD (gastroesophageal reflux disease)                        Back pain                                                       Comment:after trauma beginning in 195    Sleep apnea                                                   Allergy                                                       Arthritis                                                     Joint pain                                                    Hepatitis                                       1970's          Comment:A    Visual disturbance                              2012            Comment:problems after cataract surgery    Degenerative disc disease                                     Fibromyalgia                                    2013          Cluster headache                                2013          Diverticulitis                                  12/2013       Thyroid nodule                                  7/16/2014     Special screening for malignant neoplasms, col* 5/5/2014      Adenomatous polyp                                             Past Surgical History:    SINUS SURGERY                                                  KNEE SURGERY                                                     Comment:involving arthroscopic surgery to both knees    HEMORRHOID SURGERY                                               Comment:with complication of chronic bleeding for 6                weeks     CHOLECYSTECTOMY                                                SINUS SURGERY                                                    Comment:left molar and sinus surgery for trigeminal                neuralgia    EYE SURGERY                                                     BACK SURGERY                                                   JOINT REPLACEMENT                                              TOTAL HIP ARTHROPLASTY                           4/2012          Comment:Pt states he had total hip replacement on his                left hip.    COSMETIC SURGERY                                 2/10/2015       Comment:Direct mid-forehead brow lift    COSMETIC SURGERY                                 2/10/2015       Comment:Bilateral upper lid blepahroplasty    SPINE SURGERY                                    6/22/15         Comment:XLIF/TANA    CATARACT EXTRACTION W/  INTRAOCULAR LENS IMPLA* Bilateral               SPINAL FUSION                                    6/22/2015       Comment:L3-L5 XLIF    Review of patient's family history indicates:    Cancer                         Mother                      Comment: colon; uterine    Nephrolithiasis                Mother                    Stroke                         Mother                    Pancreatitis                   Brother                   Vision loss                    Brother                     Comment: macular degeneration    Diabetes                       Brother                   Macular degeneration           Brother                   Melanoma                       Neg Hx                    Amblyopia                      Neg Hx                    Blindness                      Neg Hx                    Cataracts                      Neg Hx                    Glaucoma                       Neg Hx                    Hypertension                   Neg Hx                    Retinal detachment             Neg Hx                    Strabismus                     Neg Hx                    Thyroid disease                Neg Hx                    Migraines                      Sister                    No Known Problems              Father                    No Known Problems              Maternal Grandmother      No Known  Problems              Maternal Grandfather      No Known Problems              Paternal Grandmother      No Known Problems              Paternal Grandfather      No Known Problems              Sister                    No Known Problems              Maternal Aunt             No Known Problems              Maternal Uncle            No Known Problems              Paternal Aunt             No Known Problems              Paternal Uncle              Social History    Marital Status:              Spouse Name:                       Years of Education:                 Number of children:               Occupational History  Occupation          Employer            Comment               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*                         Social History Main Topics    Smoking Status: Never Smoker                      Smokeless Status: Never Used                        Alcohol Use: Yes                Comment: occasion    Drug Use: No              Sexual Activity: Yes               Partners with: Female    Other Topics            Concern    None on file    Social History Narrative    None on file        Allergies:  Alphagan; Coumadin; and Oxycodone    Medications:  Current outpatient prescriptions: alendronate (FOSAMAX) 70 MG tablet, Take 1 tablet (70 mg total) by mouth every 7 days., Disp: 12 tablet, Rfl: 3;  clonazePAM (KLONOPIN) 1 MG tablet, TAKE 2 TABLETS AT BEDTIME. (Patient taking differently: TAKE 2 TABLETS AT BEDTIME. patient states taking 1.5), Disp: 180 tablet, Rfl: 1;  desoximetasone 0.05 % Gel, Apply to affected area 2 times a day as needed, Disp: , Rfl: 1  ergocalciferol (VITAMIN D2) 50,000 unit Cap, TAKE 1 CAPSULE (50,000 UNITS TOTAL) BY MOUTH TWICE A WEEK., Disp: 24 capsule, Rfl: 3;  fish oil-omega-3 fatty acids 300-1,000 mg capsule, Take 2 g by mouth once daily., Disp: , Rfl: ;  (START ON 12/22/2015) hydrocodone-acetaminophen 10-325mg (NORCO)  mg Tab, Take 1 tablet by mouth 3 (three) times daily as needed.,  Disp: 90 tablet, Rfl: 0  hydrocortisone-pramoxine (ANALPRAM-HC) 2.5-1 % Crea, 2.5 mg., Disp: , Rfl: ;  ketoconazole (NIZORAL) 2 % cream, Apply to affected area 2 times a day for 2 weeks, Disp: , Rfl: 1;  lamotrigine (LAMICTAL) 200 MG tablet, One and one/half daily (Patient taking differently: Take 150 mg by mouth once daily. One and one/half daily), Disp: 135 tablet, Rfl: 3  magnesium oxide (MAG-OX) 400 mg tablet, Take 1 tablet (400 mg total) by mouth 2 (two) times daily., Disp: 60 tablet, Rfl: 12;  meclizine (ANTIVERT) 25 mg tablet, Take 1 tablet (25 mg total) by mouth every 6 (six) hours. Prn vertigo (Patient taking differently: Take 25 mg by mouth every 6 (six) hours as needed. Prn vertigo), Disp: 25 tablet, Rfl: 1;  methylPREDNISolone (MEDROL DOSEPACK) 4 mg tablet, use as directed, Disp: 1 Package, Rfl: 0  pilocarpine HCL 1% (PILOCAR) 1 % ophthalmic solution, Place 1 drop into both eyes once as needed. , Disp: , Rfl: ;  pravastatin (PRAVACHOL) 40 MG tablet, TAKE 1 TABLET (40 MG TOTAL) BY MOUTH EVERY EVENING., Disp: 30 tablet, Rfl: 5;  rabeprazole (ACIPHEX) 20 mg tablet, Take 2 tablets (40 mg total) by mouth once daily., Disp: 180 tablet, Rfl: 3;  selegiline (EMSAM) 12 mg/24 hr, Place 1 patch onto the skin once daily., Disp: 90 patch, Rfl: 3  senna-docusate 8.6-50 mg (PERICOLACE) 8.6-50 mg per tablet, Take 2 tablets by mouth nightly as needed for Constipation., Disp: , Rfl: ;  trazodone (DESYREL) 100 MG tablet, 1 and 1/2 talbet daily (Patient taking differently: every evening. 1 and 1/2 talbet daily), Disp: 135 tablet, Rfl: 3;  UBIDECARENONE (CO Q-10 ORAL), Take 1 capsule by mouth once daily. , Disp: , Rfl:   UNABLE TO FIND, medication name: cefaly + electrodes, wear 20-30 mins, Dx: migraine, Disp: 1 Units, Rfl: 0  Current facility-administered medications: gentamicin injection 160 mg, 160 mg, Intramuscular, Q12H, Usha Gonzales, LINDA, 160 mg at 12/17/15 1504                Review of Systems   Constitutional:  Negative for activity change, appetite change, chills and fever.   HENT: Negative for facial swelling and trouble swallowing.    Eyes: Negative for visual disturbance.   Respiratory: Negative for chest tightness and shortness of breath.    Cardiovascular: Negative for chest pain and palpitations.   Gastrointestinal: Negative.  Negative for abdominal pain, constipation, diarrhea, nausea and vomiting.   Genitourinary: Positive for frequency. Negative for difficulty urinating, dysuria, flank pain, hematuria, penile pain, penile swelling, scrotal swelling and testicular pain.   Musculoskeletal: Positive for arthralgias, back pain and myalgias. Negative for gait problem and neck stiffness.        Reports some muscle wasting to calves bilaterally; being followed by neurology     Skin: Negative for rash.   Neurological: Positive for weakness. Negative for dizziness and speech difficulty.   Hematological: Does not bruise/bleed easily.   Psychiatric/Behavioral: Negative for behavioral problems.       Objective:      Physical Exam   Nursing note and vitals reviewed.  Constitutional: He is oriented to person, place, and time. Vital signs are normal. He appears well-developed and well-nourished. He is active and cooperative.  Non-toxic appearance. He does not have a sickly appearance.   Urine dipped clear of infection/blood in the office today.  PVR in the office today by the nurse was 20cc.     HENT:   Head: Normocephalic and atraumatic.   Right Ear: External ear normal.   Left Ear: External ear normal.   Nose: Nose normal.   Mouth/Throat: Mucous membranes are normal.   Eyes: Conjunctivae and lids are normal. No scleral icterus.   Neck: Trachea normal, normal range of motion and full passive range of motion without pain. Neck supple. No JVD present. No tracheal deviation present.   Cardiovascular: Normal rate, S1 normal and S2 normal.    Pulmonary/Chest: Effort normal. No respiratory distress. He exhibits no tenderness.    Abdominal: Soft. Normal appearance and bowel sounds are normal. There is no hepatosplenomegaly. There is no tenderness. There is no CVA tenderness.   Genitourinary: Rectum normal, testes normal and penis normal. Rectal exam shows no external hemorrhoid, no mass and no tenderness. Prostate is enlarged. Prostate is not tender. Right testis shows no mass, no swelling and no tenderness. Left testis shows no mass, no swelling and no tenderness. Uncircumcised. No phimosis, paraphimosis, hypospadias, penile erythema or penile tenderness. No discharge found.       Musculoskeletal: Normal range of motion.   Lymphadenopathy: No inguinal adenopathy noted on the right or left side.   Neurological: He is alert and oriented to person, place, and time. He has normal strength.   Skin: Skin is warm, dry and intact.     Psychiatric: He has a normal mood and affect. His behavior is normal. Judgment and thought content normal.       Assessment:       1. BPH with urinary obstruction    2. Bilateral groin pain    3. Elevated PSA        Plan:         I spent 25 minutes with the patient of which more than half was spent in direct consultation with the patient in regards to our treatment and plan.    Education and recommendations of today's plan of care including home remedies.  We discussed in office findings; not showing obvious prostate infection; urine clear; prostate no infection.  Recommend nsaids (refilled his mobic);   Update PSA today.  F/u depending on results and if improvement.  RTC 6 months with PSA.

## 2018-02-02 ENCOUNTER — OFFICE VISIT (OUTPATIENT)
Dept: ALLERGY | Facility: CLINIC | Age: 68
End: 2018-02-02
Payer: COMMERCIAL

## 2018-02-02 ENCOUNTER — LAB VISIT (OUTPATIENT)
Dept: LAB | Facility: HOSPITAL | Age: 68
End: 2018-02-02
Attending: ALLERGY & IMMUNOLOGY
Payer: COMMERCIAL

## 2018-02-02 VITALS — OXYGEN SATURATION: 97 % | HEIGHT: 69 IN | WEIGHT: 179.88 LBS | HEART RATE: 66 BPM | BODY MASS INDEX: 26.64 KG/M2

## 2018-02-02 DIAGNOSIS — R53.1 WEAKNESS: ICD-10-CM

## 2018-02-02 DIAGNOSIS — J31.0 OTHER CHRONIC RHINITIS: ICD-10-CM

## 2018-02-02 DIAGNOSIS — J32.9 RECURRENT SINUS INFECTIONS: ICD-10-CM

## 2018-02-02 DIAGNOSIS — J31.0 OTHER CHRONIC RHINITIS: Primary | ICD-10-CM

## 2018-02-02 LAB
ALBUMIN SERPL BCP-MCNC: 3.5 G/DL
ALP SERPL-CCNC: 41 U/L
ALT SERPL W/O P-5'-P-CCNC: 15 U/L
ANION GAP SERPL CALC-SCNC: 11 MMOL/L
AST SERPL-CCNC: 20 U/L
BASOPHILS # BLD AUTO: 0.02 K/UL
BASOPHILS NFR BLD: 0.3 %
BILIRUB SERPL-MCNC: 0.6 MG/DL
BUN SERPL-MCNC: 13 MG/DL
CALCIUM SERPL-MCNC: 9.2 MG/DL
CHLORIDE SERPL-SCNC: 101 MMOL/L
CO2 SERPL-SCNC: 24 MMOL/L
CREAT SERPL-MCNC: 0.8 MG/DL
DIFFERENTIAL METHOD: NORMAL
EOSINOPHIL # BLD AUTO: 0.1 K/UL
EOSINOPHIL NFR BLD: 1.8 %
ERYTHROCYTE [DISTWIDTH] IN BLOOD BY AUTOMATED COUNT: 12 %
EST. GFR  (AFRICAN AMERICAN): >60 ML/MIN/1.73 M^2
EST. GFR  (NON AFRICAN AMERICAN): >60 ML/MIN/1.73 M^2
GLUCOSE SERPL-MCNC: 88 MG/DL
HCT VFR BLD AUTO: 43 %
HGB BLD-MCNC: 14.4 G/DL
IGA SERPL-MCNC: 121 MG/DL
IGE SERPL-ACNC: <35 IU/ML
IGG SERPL-MCNC: 995 MG/DL
IGM SERPL-MCNC: 23 MG/DL
IMM GRANULOCYTES # BLD AUTO: 0.01 K/UL
IMM GRANULOCYTES NFR BLD AUTO: 0.1 %
LYMPHOCYTES # BLD AUTO: 1.9 K/UL
LYMPHOCYTES NFR BLD: 27.7 %
MCH RBC QN AUTO: 30.4 PG
MCHC RBC AUTO-ENTMCNC: 33.5 G/DL
MCV RBC AUTO: 91 FL
MONOCYTES # BLD AUTO: 0.5 K/UL
MONOCYTES NFR BLD: 8 %
NEUTROPHILS # BLD AUTO: 4.2 K/UL
NEUTROPHILS NFR BLD: 62.1 %
NRBC BLD-RTO: 0 /100 WBC
PLATELET # BLD AUTO: 245 K/UL
PMV BLD AUTO: 10.3 FL
POTASSIUM SERPL-SCNC: 4.1 MMOL/L
PROT SERPL-MCNC: 7.1 G/DL
RBC # BLD AUTO: 4.73 M/UL
SODIUM SERPL-SCNC: 136 MMOL/L
WBC # BLD AUTO: 6.71 K/UL

## 2018-02-02 PROCEDURE — 82785 ASSAY OF IGE: CPT

## 2018-02-02 PROCEDURE — 86355 B CELLS TOTAL COUNT: CPT

## 2018-02-02 PROCEDURE — 80053 COMPREHEN METABOLIC PANEL: CPT

## 2018-02-02 PROCEDURE — 86774 TETANUS ANTIBODY: CPT

## 2018-02-02 PROCEDURE — 86360 T CELL ABSOLUTE COUNT/RATIO: CPT

## 2018-02-02 PROCEDURE — 82784 ASSAY IGA/IGD/IGG/IGM EACH: CPT

## 2018-02-02 PROCEDURE — 99999 PR PBB SHADOW E&M-EST. PATIENT-LVL III: CPT | Mod: PBBFAC,,, | Performed by: ALLERGY & IMMUNOLOGY

## 2018-02-02 PROCEDURE — 3008F BODY MASS INDEX DOCD: CPT | Mod: S$GLB,,, | Performed by: ALLERGY & IMMUNOLOGY

## 2018-02-02 PROCEDURE — 86359 T CELLS TOTAL COUNT: CPT

## 2018-02-02 PROCEDURE — 86357 NK CELLS TOTAL COUNT: CPT

## 2018-02-02 PROCEDURE — 85025 COMPLETE CBC W/AUTO DIFF WBC: CPT

## 2018-02-02 PROCEDURE — 99204 OFFICE O/P NEW MOD 45 MIN: CPT | Mod: S$GLB,,, | Performed by: ALLERGY & IMMUNOLOGY

## 2018-02-02 PROCEDURE — 86003 ALLG SPEC IGE CRUDE XTRC EA: CPT | Mod: 59

## 2018-02-02 PROCEDURE — 86003 ALLG SPEC IGE CRUDE XTRC EA: CPT

## 2018-02-02 PROCEDURE — 82784 ASSAY IGA/IGD/IGG/IGM EACH: CPT | Mod: 59

## 2018-02-02 PROCEDURE — 82787 IGG 1 2 3 OR 4 EACH: CPT

## 2018-02-02 PROCEDURE — 36415 COLL VENOUS BLD VENIPUNCTURE: CPT | Mod: PO

## 2018-02-02 NOTE — PROGRESS NOTES
Subjective:       Patient ID: Raffy Rutherford Jr. is a 67 y.o. male.    Chief Complaint:  Sinus Problem (hx of sinus issues, has gotten worse lately)      66 yo man presents for new patient evaluation of possible allergies. He states he gets feeling of heaviness in head, sinus pressure, malaise, chills, some body aches. Feels like he is sick but does not think he is. He has some sneezing. No runny nose, no itchy nose or eyes. occ has stuffy nose but not necessarily with this. No PND, no chest congestion, tightness, wheeze or SOB.  He takes zyrtec daily, not sure if it helps. When flares Celia seltzer cold plus helps. He has had this for 20 years but gotten more frequent and last longer. In past few months been about twice per week and last 1-3 days. No season is worse. Little worse early AM and evening. He is sensitive to cut gras, perfumes, colognes these all given headaches. He had allergy test in 0's and did shots. Also had 2 sinus surgeries and none really helped. He had allergy test again with Dr Alonzo years ago but all reacted so told dermatographic and no real allergy. Never had blood test. He has no asthma or eczema. No known food or latex allergy. Had large local reactions to wasp and bee stings but been decades. He does also had weakness of legs and muscle wasting. Headaches, tinnitus. 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         Environmental History: see history section for home environment  Review of Systems   Constitutional: Positive for chills and fatigue. Negative for activity change, appetite change, fever and unexpected weight change.   HENT: Positive for congestion, sinus pain, sinus pressure, sneezing and tinnitus. Negative for ear discharge, ear pain, facial swelling, hearing loss, mouth sores, nosebleeds, postnasal drip, rhinorrhea, sore throat, trouble swallowing and voice change.    Eyes: Negative for discharge, redness, itching  and visual disturbance.   Respiratory: Negative for cough, chest tightness, shortness of breath and wheezing.    Cardiovascular: Negative for chest pain, palpitations and leg swelling.   Gastrointestinal: Negative for abdominal distention, abdominal pain, constipation, diarrhea, nausea and vomiting.   Genitourinary: Negative for difficulty urinating.   Musculoskeletal: Positive for arthralgias and myalgias. Negative for back pain and joint swelling.   Skin: Negative for color change, pallor and rash.   Neurological: Positive for weakness and headaches. Negative for dizziness, tremors, speech difficulty and light-headedness.   Hematological: Negative for adenopathy. Does not bruise/bleed easily.   Psychiatric/Behavioral: Negative for agitation, confusion, decreased concentration and sleep disturbance. The patient is not nervous/anxious.         Objective:    Physical Exam   Constitutional: He is oriented to person, place, and time. He appears well-developed and well-nourished. No distress.   HENT:   Head: Normocephalic and atraumatic.   Right Ear: Hearing, tympanic membrane, external ear and ear canal normal.   Left Ear: Hearing, tympanic membrane, external ear and ear canal normal.   Nose: No mucosal edema (pink turbinates), rhinorrhea, sinus tenderness or septal deviation. No epistaxis.   Mouth/Throat: Oropharynx is clear and moist and mucous membranes are normal. No uvula swelling.   Eyes: Conjunctivae are normal. Right eye exhibits no discharge. Left eye exhibits no discharge.   Neck: Normal range of motion. No thyromegaly present.   Cardiovascular: Normal rate, regular rhythm and normal heart sounds.    No murmur heard.  Pulmonary/Chest: Effort normal and breath sounds normal. No respiratory distress. He has no wheezes.   Abdominal: Soft. He exhibits no distension. There is no tenderness.   Musculoskeletal: Normal range of motion. He exhibits no edema.   Lymphadenopathy:     He has no cervical adenopathy.    Neurological: He is alert and oriented to person, place, and time. Coordination normal.   Skin: Skin is warm and dry. No rash noted. No erythema.   Psychiatric: He has a normal mood and affect. His behavior is normal. Judgment and thought content normal.   Nursing note and vitals reviewed.      Laboratory:   none performed   Assessment:       1. Other chronic rhinitis    2. Recurrent sinus infections    3. Weakness         Plan:       1. discussed with pt and wife that he does not have typical allergy symptoms, wonder about more recurrent infections, will send labs for immunocaps and immune system. If all normal may even need rheum eval given muscle weakness, pain and fatigue  2. Phone review

## 2018-02-05 LAB
A ALTERNATA IGE QN: <0.35 KU/L
A FUMIGATUS IGE QN: <0.35 KU/L
ALLERGEN MAPLE/SYCAMORE IGE: <0.35 KU/L
ALLERGEN PENICILLIUM IGE: <0.35 KU/L
ALLERGEN WALNUT TREE IGE: <0.35 KU/L
ALLERGEN WHITE PINE TREE IGE: <0.35 KU/L
ALLERGEN WILLOW IGE: <0.35 KU/L
BAHIA GRASS IGE QN: <0.35 KU/L
BERMUDA GRASS IGE QN: <0.35 KU/L
C HERBARUM IGE QN: <0.35 KU/L
C LUNATA IGE QN: <0.35 KU/L
CAT DANDER IGE QN: <0.35 KU/L
CD3+CD4+ CELLS # BLD: 1272 CELLS/UL (ref 300–1400)
CD3+CD4+ CELLS NFR BLD: 66 % (ref 28–57)
COMMON RAGWEED IGE QN: <0.35 KU/L
COTTONWOOD IGE QN: <0.35 KU/L
D FARINAE IGE QN: <0.35 KU/L
D PTERONYSS IGE QN: <0.35 KU/L
DEPRECATED A ALTERNATA IGE RAST QL: NORMAL
DEPRECATED A FUMIGATUS IGE RAST QL: NORMAL
DEPRECATED BAHIA GRASS IGE RAST QL: NORMAL
DEPRECATED BERMUDA GRASS IGE RAST QL: NORMAL
DEPRECATED C HERBARUM IGE RAST QL: NORMAL
DEPRECATED C LUNATA IGE RAST QL: NORMAL
DEPRECATED CAT DANDER IGE RAST QL: NORMAL
DEPRECATED COMMON RAGWEED IGE RAST QL: NORMAL
DEPRECATED COTTONWOOD IGE RAST QL: NORMAL
DEPRECATED D FARINAE IGE RAST QL: NORMAL
DEPRECATED D PTERONYSS IGE RAST QL: NORMAL
DEPRECATED DOG DANDER IGE RAST QL: NORMAL
DEPRECATED ENGL PLANTAIN IGE RAST QL: NORMAL
DEPRECATED JOHNSON GRASS IGE RAST QL: NORMAL
DEPRECATED MARSH ELDER IGE RAST QL: NORMAL
DEPRECATED PECAN/HICK TREE IGE RAST QL: NORMAL
DEPRECATED ROACH IGE RAST QL: NORMAL
DEPRECATED S ROSTRATA IGE RAST QL: NORMAL
DEPRECATED SALTWORT IGE RAST QL: NORMAL
DEPRECATED SILVER BIRCH IGE RAST QL: NORMAL
DEPRECATED TIMOTHY IGE RAST QL: NORMAL
DEPRECATED WHITE OAK IGE RAST QL: NORMAL
DOG DANDER IGE QN: <0.35 KU/L
ENGL PLANTAIN IGE QN: <0.35 KU/L
IGG1 SER-MCNC: 531 MG/DL
IGG2 SER-MCNC: 350 MG/DL
IGG3 SER-MCNC: 18 MG/DL
IGG4 SER-MCNC: 31 MG/DL
JOHNSON GRASS IGE QN: <0.35 KU/L
LYMPHOCYTES NFR CSF MANUAL: 144 CELLS/UL (ref 100–500)
LYMPHOCYTES NFR CSF MANUAL: 1577 CELLS/UL (ref 700–2100)
LYMPHOCYTES NFR CSF MANUAL: 179 CELLS/UL (ref 90–600)
LYMPHOCYTES NFR CSF MANUAL: 2.77 % (ref 0.9–3.6)
LYMPHOCYTES NFR CSF MANUAL: 23.8 % (ref 10–39)
LYMPHOCYTES NFR CSF MANUAL: 459 CELLS/UL (ref 200–900)
LYMPHOCYTES NFR CSF MANUAL: 7.6 % (ref 6–19)
LYMPHOCYTES NFR CSF MANUAL: 81.8 % (ref 55–83)
LYMPHOCYTES NFR CSF MANUAL: 9.5 % (ref 7–31)
MAPLE/SYCAMORE CLASS: NORMAL
MARSH ELDER IGE QN: <0.35 KU/L
PECAN/HICK TREE IGE QN: <0.35 KU/L
PENICILLIUM CLASS: NORMAL
ROACH IGE QN: <0.35 KU/L
S ROSTRATA IGE QN: <0.35 KU/L
SALTWORT IGE QN: <0.35 KU/L
SILVER BIRCH IGE QN: <0.35 KU/L
TIMOTHY IGE QN: <0.35 KU/L
WALNUT TREE CLASS: NORMAL
WHITE OAK IGE QN: <0.35 KU/L
WHITE PINE CLASS: NORMAL
WILLOW CLASS: NORMAL

## 2018-02-06 LAB
BALD CYPRESS IGE QN: <0.35 KU/L
C GLOBOSUM IGE QN: <0.35 KU/L
DEPRECATED BALD CYPRESS IGE RAST QL: NORMAL
DEPRECATED C GLOBOSUM IGE RAST QL: NORMAL
DEPRECATED MUGWORT IGE RAST QL: NORMAL
MUGWORT IGE QN: <0.35 KU/L

## 2018-02-15 ENCOUNTER — PATIENT MESSAGE (OUTPATIENT)
Dept: ALLERGY | Facility: CLINIC | Age: 68
End: 2018-02-15

## 2018-02-16 ENCOUNTER — DOCUMENTATION ONLY (OUTPATIENT)
Dept: REHABILITATION | Facility: HOSPITAL | Age: 68
End: 2018-02-16

## 2018-02-16 NOTE — PROGRESS NOTES
PHYSICAL THERAPY DISCHARGE SUMMARY     Name: Raffy Rutherford Jr.  Clinic Number: [id number]    Diagnosis: No diagnosis found.  Physician: No ref. provider found  Treatment Orders: Therapeutic exercise  Past Medical History:   Diagnosis Date    Adenomatous polyp 2003    Adenomatous polyp     Allergy     Arthritis     Back pain     after trauma beginning in 195    Chronic pain     neck and left shoulder    Cluster headache 2013    Colon polyp     Degenerative disc disease     Depression     Diverticulitis 12/2013    Elevated PSA     Fibromyalgia 2013    GERD (gastroesophageal reflux disease)     Hepatitis 1970's    A    History of prostate biopsy 2002    Hyperlipidemia     Joint pain     Sleep apnea     Special screening for malignant neoplasms, colon 5/5/2014    Thyroid nodule 7/16/2014    Visual disturbance 2012    problems after cataract surgery       Initial visit: 11/1/17  Date of Last visit: 11/27/17  Date of Discharge Note:  2/16/18  Total Visits Received: 5  Missed Visits: 4  ASSESSMENT   Status Towards Goals Met:  non-compliance    Goals Not achieved and why: see above    Discharge reason : Pt has not re-scheduled further follow-up sessions    G-CODE: Discharge g-code not filed due to discharge note being documentation only. Upon eval Pt was at CK at least 40% < 60% impaired, limited or restricted on the FOTO with g-code goal set at CK at least 40% < 60% impaired, limited or restricted    PLAN   This patient is discharged from Physical Therapy Services.     Medical necessity is demonstrated by the following IMPAIRMENTS/PROBLEM LIST:              1) Pain limiting function              2) Posture dysfunction              3) Core/Lumbar/LE weakness              4) Decreased thoracic/lumbar joint mobility              5) Decreased Lumbar ROM              6) Decreased soft tissue extensibility/fascia restriction              7) Decreased LE flexibility: bilateral hamstrings and right  piriformis              8) Lack of HEP              9) LE paresthesia bilaterally right greater than left     GOALS:   Short Term Goals:  4 weeks  1. Report decreased lumbar pain </= 5/10 at worst to increase tolerance for prolonged sitting  2. Pt. to demonstrate proper cervical and scapula retraction requiring min. to no verbal cues from PT  3. Pt. to demonstrate increased MMT for right DF to 3/5 to increase ability to clear toes during gait/stair negotiation.   4. Pt to tolerate HEP to improve ROM and independence with ADL's  5. Pt. to be independent with symptom management     Long Term Goals: 8 weeks  1. Report decreased lumbar pain </=  2/10 at worst to increase tolerance for prolonged sitting  2. Pt. to demonstrate proper cervical and scapula retraction requiring no verbal cues from PT  3. Pt. to demonstrate increased MMT for core/lumbar paraspinals to 3+/5 to increase endurance with prolonged sitting.   4. Pt. to demonstrate increased MMT for bilateral gluteus medius to 4+/5  to increase stability during ambulation on uneven surfaces.  5. Pt. to demonstrate increased MMT for bilateral DF to 3+/5 to decrease likelihood of falls during community ambulation.  7. Pt. to demonstrate increased MMT for left hip flexor to 4+/5 to increase tolerance for ADL and work activities.   8. Pt to be independent with HEP to improve ROM and independence with ADL's  9. Pt. to report a decrease in bilatera LE paresthesia by >/= 25% to improve ability to perform community ambulation

## 2018-03-12 DIAGNOSIS — K21.9 GASTROESOPHAGEAL REFLUX DISEASE, ESOPHAGITIS PRESENCE NOT SPECIFIED: ICD-10-CM

## 2018-03-12 RX ORDER — RABEPRAZOLE SODIUM 20 MG/1
20 TABLET, DELAYED RELEASE ORAL 2 TIMES DAILY
Qty: 180 TABLET | Refills: 3 | Status: SHIPPED | OUTPATIENT
Start: 2018-03-12 | End: 2018-03-13 | Stop reason: SDUPTHER

## 2018-03-13 DIAGNOSIS — K21.9 GASTROESOPHAGEAL REFLUX DISEASE, ESOPHAGITIS PRESENCE NOT SPECIFIED: ICD-10-CM

## 2018-03-13 RX ORDER — RABEPRAZOLE SODIUM 20 MG/1
20 TABLET, DELAYED RELEASE ORAL 2 TIMES DAILY
Qty: 180 TABLET | Refills: 3 | Status: SHIPPED | OUTPATIENT
Start: 2018-03-13 | End: 2019-03-19

## 2018-05-10 LAB
C DIPHTHERIAE AB SER IA-ACNC: 0.27 IU/ML
C TETANI AB SER-ACNC: >7 IU/ML
DEPRECATED S PNEUM 1 IGG SER-MCNC: 1.7 MCG/ML
DEPRECATED S PNEUM12 IGG SER-MCNC: 1.6 MCG/ML
DEPRECATED S PNEUM14 IGG SER-MCNC: 3.2 MCG/ML
DEPRECATED S PNEUM19 IGG SER-MCNC: 0.8 MCG/ML
DEPRECATED S PNEUM23 IGG SER-MCNC: <0.3 MCG/ML
DEPRECATED S PNEUM3 IGG SER-MCNC: <0.3 MCG/ML
DEPRECATED S PNEUM4 IGG SER-MCNC: 0.9 MCG/ML
DEPRECATED S PNEUM5 IGG SER-MCNC: <0.3 MCG/ML
DEPRECATED S PNEUM8 IGG SER-MCNC: 0.3 MCG/ML
DEPRECATED S PNEUM9 IGG SER-MCNC: <0.3 MCG/ML
HAEM INFLU B IGG SER-MCNC: 0.72 MG/L
S PNEUM DA 18C IGG SER-MCNC: 3.2 MCG/ML
S PNEUM DA 6B IGG SER-MCNC: <0.3 MCG/ML
S PNEUM DA 7F IGG SER-MCNC: <0.3 MCG/ML
S PNEUM DA 9V IGG SER-MCNC: <0.3 MCG/ML

## 2018-05-15 ENCOUNTER — OFFICE VISIT (OUTPATIENT)
Dept: OTOLARYNGOLOGY | Facility: CLINIC | Age: 68
End: 2018-05-15
Payer: COMMERCIAL

## 2018-05-15 VITALS — HEART RATE: 58 BPM | SYSTOLIC BLOOD PRESSURE: 114 MMHG | TEMPERATURE: 98 F | DIASTOLIC BLOOD PRESSURE: 80 MMHG

## 2018-05-15 DIAGNOSIS — Z98.890 S/P NASAL SEPTOPLASTY: ICD-10-CM

## 2018-05-15 DIAGNOSIS — H57.12 PERIORBITAL PAIN, LEFT: Primary | ICD-10-CM

## 2018-05-15 DIAGNOSIS — R51.9 UNILATERAL HEADACHE: ICD-10-CM

## 2018-05-15 DIAGNOSIS — Z98.890 S/P SINUS SURGERY: ICD-10-CM

## 2018-05-15 PROCEDURE — 99213 OFFICE O/P EST LOW 20 MIN: CPT | Mod: S$GLB,,, | Performed by: OTOLARYNGOLOGY

## 2018-05-15 PROCEDURE — 99999 PR PBB SHADOW E&M-EST. PATIENT-LVL IV: CPT | Mod: PBBFAC,,, | Performed by: OTOLARYNGOLOGY

## 2018-05-15 NOTE — PATIENT INSTRUCTIONS
Sinus CT ordered; call for results  Pt. may use AYR nasal mist +/- Dario Med rinse kit with distilled water prn  Use of Flonase NSS may help; 1-2 sprays @ lateral nostril once a day may help  Consider audiometry on return prn  Migraine diet sheet provided

## 2018-05-15 NOTE — PROGRESS NOTES
CC: Ear cleaning plus  HPI:Mr. Rutherford is a 68-year-old  male who completed an extensive evaluation for ALLERGIES by Dr. Majo Whitmore 2/2/18.  He indicates the need for ear canal inspections and cleaning procedures today.  I am reminded that his daughter is getting  in several weeks.  Another of his concerns today is his recurrent left periorbital pain and pressure symptoms which may be indicative of a sinus condition.  He indicates to previous nasal/sinus surgeries last which was performed by Dr. Padilla.  I cannot locate the operative report as the EMR only dates back to 2006.    He remembers a septoplasty procedure performed in [place] when he was 21 years of age.    His last audiometric study completed 11/23/16 indicated his sloping bilateral mild to severe 3 through 8K sensorineural hearing losses.  He had ndicated a right ear tinnitus worse than left at that visit. Prior audiometric studies completed in 2015 indicated better hearing measured at 4 and 8K for both ears at that time.  He undergone a tinnitus evaluation for a 2 year history of intermittent unilateral tinnitus perception which become more frequent in January 2016 performed by Kavin Espinosa.    ALLERGIES: Alphagan, Coumadin, oxycodone  Past Medical History:   Diagnosis Date    Adenomatous polyp 2003    Adenomatous polyp     Allergy     Arthritis     Back pain     after trauma beginning in 195    Chronic pain     neck and left shoulder    Cluster headache 2013    Colon polyp     Degenerative disc disease     Depression     Diverticulitis 12/2013    Elevated PSA     Fibromyalgia 2013    GERD (gastroesophageal reflux disease)     Hepatitis 1970's    A    History of prostate biopsy 2002    Hyperlipidemia     Joint pain     Sleep apnea     Special screening for malignant neoplasms, colon 5/5/2014    Thyroid nodule 7/16/2014    Visual disturbance 2012    problems after cataract surgery     Current Outpatient  Prescriptions on File Prior to Visit   Medication Sig Dispense Refill    benzonatate (TESSALON) 100 MG capsule Take 200 mg by mouth 3 (three) times daily as needed.  1    cyanocobalamin 1,000 mcg/mL injection   1    fish oil-omega-3 fatty acids 300-1,000 mg capsule Take 2 g by mouth once daily.      lamotrigine (LAMICTAL) 100 MG tablet Take 1 tablet (100 mg total) by mouth once daily. 90 tablet 3    pravastatin (PRAVACHOL) 40 MG tablet Take 1 tablet (40 mg total) by mouth once daily. 90 tablet 3    RABEprazole (ACIPHEX) 20 mg tablet Take 1 tablet (20 mg total) by mouth 2 (two) times daily. 180 tablet 3    selegiline (EMSAM) 12 mg/24 hr Place 1 patch onto the skin once daily. 90 patch 3    senna-docusate 8.6-50 mg (PERICOLACE) 8.6-50 mg per tablet Take 2 tablets by mouth nightly as needed for Constipation.      sucralfate (CARAFATE) 1 gram tablet as needed.       trazodone (DESYREL) 100 MG tablet Take 2 tablets (200 mg total) by mouth every evening. 180 tablet 3    UNABLE TO FIND medication name: cefaly + electrodes, wear 20-30 mins, Dx: migraine 1 Units 0    VOLTAREN 1 % Gel       [DISCONTINUED] meclizine (ANTIVERT) 25 mg tablet Take 1 tablet (25 mg total) by mouth every 6 (six) hours. Prn vertigo (Patient taking differently: Take 25 mg by mouth every 6 (six) hours as needed. Prn vertigo) 25 tablet 1     No current facility-administered medications on file prior to visit.      His medical problem list includes cervicalgia, occipital neuralgia, chronic migraine, paroxysmal hemicrania, static nerve pain, degenerative joint disease of lumbar spine, chronic neck pain, right lumbar radiculopathy, thyroid nodule, paresthesia or brow ptosis, right wrist carpal tunnel syndrome, fibromyalgia, acquired spondylolisthesis, sleep apnea, back pain after trauma, GERD, hyperlipidemia, high frequency sensorineural hearing loss, tinnitus, depression    PE: Blood pressure 114/80 pulse 58 temperature 97.9  Gen.: Alert and  oriented gentleman in no acute distress  Both ears were examined under the microscope in the micro-procedure room.  A small volume of cerumen is removed from each ear canal.  Both eardrums are intact and clear as visualized directly.  Nasal exam reveals evidence for dry mucosa of the hypertrophic left greater than right middle turbinates.   There is no evidence of purulent discharge of polypoid disease in either passage after Yan-Synephrine decongestion of each.  Oropharyngeal exam is unremarkable for inflammation infection or ulceration.  Neck examination is within normal limits.      DIAGNOSIS:     ICD-10-CM ICD-9-CM    1. Periorbital pain, left H57.12 379.91 CT [employer]onic Sinuses without   2. Unilateral headache R51 784.0 CT [employer] Sinuses without   3. S/P sinus surgery Z98.890 V45.89 CT [employer] Sinuses without   4. S/P nasal septoplasty Z98.890 V45.89 CT [employer]tronic Sinuses without   5.      Hx tinnitus; hx high frequency SNHL    PLAN: Sinus CT ordered; call for results  Pt. may use AYR nasal mist +/- Dario Med rinse kit with distilled water prn  Use of Flonase NSS may help; 1-2 sprays @ lateral nostril once a day may help  Consider audiometry on return prn  Migraine diet sheet provided

## 2018-05-21 ENCOUNTER — TELEPHONE (OUTPATIENT)
Dept: NEUROLOGY | Facility: CLINIC | Age: 68
End: 2018-05-21

## 2018-05-21 NOTE — TELEPHONE ENCOUNTER
----- Message from Daisy Zheng RN sent at 5/21/2018  8:43 AM CDT -----  Contact: Patient @ [phone]      ----- Message -----  From: Alok Morel  Sent: 5/21/2018   8:26 AM  To: Karma Sloan Staff    Caller is calling to schedule a NP appt, to consult for headaches, pt has been seen by Dr Parada before pt states it's been a few years, pls call

## 2018-05-21 NOTE — TELEPHONE ENCOUNTER
Left message on Mr. Rutherford's voicemail requesting a call back in regards to scheduling an appt. I informed Mr. Rutherford that I would contact him with an appt once July is released.

## 2018-05-30 ENCOUNTER — OFFICE VISIT (OUTPATIENT)
Dept: OPHTHALMOLOGY | Facility: CLINIC | Age: 68
End: 2018-05-30
Payer: COMMERCIAL

## 2018-05-30 DIAGNOSIS — H18.529 ANTERIOR BASEMENT MEMBRANE DYSTROPHY: ICD-10-CM

## 2018-05-30 DIAGNOSIS — G89.29 CHRONIC LEFT-SIDED HEADACHE: ICD-10-CM

## 2018-05-30 DIAGNOSIS — R51.9 CHRONIC LEFT-SIDED HEADACHE: ICD-10-CM

## 2018-05-30 DIAGNOSIS — H57.12 EYE PAIN, LEFT: Primary | ICD-10-CM

## 2018-05-30 DIAGNOSIS — H18.452 SALZMANN'S NODULAR DEGENERATION OF CORNEA OF LEFT EYE: ICD-10-CM

## 2018-05-30 PROCEDURE — 92014 COMPRE OPH EXAM EST PT 1/>: CPT | Mod: S$GLB,,, | Performed by: OPHTHALMOLOGY

## 2018-05-30 PROCEDURE — 99999 PR PBB SHADOW E&M-EST. PATIENT-LVL III: CPT | Mod: PBBFAC,,, | Performed by: OPHTHALMOLOGY

## 2018-05-31 NOTE — PROGRESS NOTES
HPI     Referred by Dr Ocampo     S/p repeat superficial keratectomy OS 12/11/17 - for sheree   S/p superficial keratectomy OS 8/18/17  - for RES   Anterior basement membrane dystrophy   Salzmann's nodular deg.os   AT prn     Pt complains of persistent headaches for about a month. Pt states pain   scale is a 6. Denies any f/f      Last edited by Melanie Sequeira MD on 5/30/2018  3:26 PM. (History)            Assessment /Plan     For exam results, see Encounter Report.    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  Concerned HA / eye pain more neurological.  Rec. eval with dr. Mcpherson.

## 2018-06-05 ENCOUNTER — OFFICE VISIT (OUTPATIENT)
Dept: NEUROLOGY | Facility: CLINIC | Age: 68
End: 2018-06-05
Payer: COMMERCIAL

## 2018-06-05 VITALS
WEIGHT: 176.38 LBS | HEART RATE: 79 BPM | SYSTOLIC BLOOD PRESSURE: 102 MMHG | DIASTOLIC BLOOD PRESSURE: 65 MMHG | BODY MASS INDEX: 26.12 KG/M2 | HEIGHT: 69 IN

## 2018-06-05 DIAGNOSIS — G44.039 PAROXYSMAL HEMICRANIA: Primary | ICD-10-CM

## 2018-06-05 PROCEDURE — 99213 OFFICE O/P EST LOW 20 MIN: CPT | Mod: S$GLB,,, | Performed by: PSYCHIATRY & NEUROLOGY

## 2018-06-05 PROCEDURE — 99999 PR PBB SHADOW E&M-EST. PATIENT-LVL III: CPT | Mod: PBBFAC,,, | Performed by: PSYCHIATRY & NEUROLOGY

## 2018-06-05 NOTE — PROGRESS NOTES
"Follow up:   Since 2017 - HA are now on most days  lasting few hrs (worse in last 2 months)   Triggered by visual strain   Feels like a "hot poker" in L eye   8/10 intensity   + nasal congestion on L, tinnitus on R, L neck pain   Timolol drops helps relieve pain for 6 hrs     Prior note: He reports unhappiness with diagnosis of hereditary neuropathy and IVIG course      Headache history:   Age of onset - 19   Location - above and around L eye, molars upper, nose, L occipital   L Upper arm pain   Nature of pain - Stabbing   Prodrome - no   Aura - No   Duration of headache - 4 hrs to 2 days   Time to peak intensity -   Pain scale - range of intensity - 2-8/10   Frequency - 4/week   Status Migrainosus history - yes   Time of day of most headaches- anytime      Associated symptoms with the headache: cognitive slowing      Autonomic headache symptoms:   Swollen or droopy eyelid + (L)  Rhinorrhea or one-sided nasal congestion +     Symptoms of increased intracranial pressure:   Whooshing sounds - ringing   Visual spots/blotches - no     Headache Triggers:   Bright or flickering light +   Strong smell- cut grass, perfume   Fatigue +   Lack of sleep +   Vigorous exercise   Dietary factors   Visual strain   Weather changes +   Exertion   Heat (hot weather, hot baths or showers) +   Other comorbid conditions:   Anxiety - yes   Motion sickness symptom - yes   Treatment history:   Resolution of headache with sleep - yes   Meds tried:   Fiorinal , fioricet - helps sometimes   ASA - helps   Tylenol, alleve , motrin - not help   excedrin - helps   Gabapentin - not help   lamictal - not help   lyrica - cognitive   ONB 7/17 - 1 week pain relief   gralise - cognitive impairment   toradol - not tried   Calan - does not remember   celebrex - not help    timolol drops - help    Gabapentin - not help     Headache risk factors:   H/o TBI - no   H/o Meningitis - no   F/h Aneurysms - no   Headache burden:   In the last three months:   Days " missed from work = 0   Days unable to perform activities of daily living = 0   Days unable to attend social activities = 0      Prior notes:   Raffy Rutherford Jr. presents to the clinic for a follow-up appointment for Low back pain. Since the last visit, Raffy Rutherford Jr. states the pain has been persistant. Current pain intensity is 6/10. He has symptomatic L3-4 HNP, L-4 and L4-5 DDD and lumbosacral spondylosis.  recommended L3-4 and L4-5 XLIF with posterior instrumentation and microdiscectomy at L3-4 . Patient is still thinking about the surgery. Different injections did not help in the past.   Pain Medications:   - Opioids: Lorcet (Hydrocodone/Acetaminophen). Most recent prescriptions were on 12/7/2013 ( mg #90) and 12/26/2013 (5-325 mg, #80) from providers not in this clinic but in the [facility]   Opioid Contract: yes per Dr. Evans' note on 6/2013, but I do not see one recorded in Epic.    report: Reviewed and shows that he is receiving pain medications from outside this clinic.   Physical Therapy/Home Exercise: yes   Pain Scales   Best: 2/10   Worst: 8/10   Usually: 6/10   Today: 7/10   Back Pain   This is a chronic problem. The current episode started more than 1 year ago. The problem occurs intermittently. The problem has been gradually worsening since onset. The pain is present in the lumbar spine (neck, LE). The quality of the pain is described as burning (burning, tingling, numb , hot and cold.). The pain is at a severity of 7/10. The pain is worse during the night. The symptoms are aggravated by sitting (night time and lifting). Associated symptoms include abdominal pain, headaches, leg pain, numbness and tingling. He has tried bed rest (laying down, cold, morning and medications) for the symptoms.   Interventional Pain History   A series of epidural steroid injections that was completed by Dr. Karl Jefferson for the trigeminal neuralgia.   Right L3, L4 and L5 transforaminal ALYSON x3  in 2009 by Dr. Bernardo, 25% relief  L4/5 ILESI by Dr. Parada in 7/2011 and 1/2012        Imaging:      Time of Procedure: 01/25/13 13:55:00  Accession # [id number]    Reason for study: Low back pain radiating to right leg.     Comparison: 11/5/12 and 7/6/11.    Technique: Routine pre-and postcontrast MRI of the lumbar spine was performed. 18 cc of Gadolinium based contrast were used.    Findings: No acute fracture or marrow replacement process. However, there is a superior end plate compression fracture of T12 which was previously treated with vertebroplasty. This is unchanged. Otherwise, vertebral body heights and alignment are   maintained. There is disk space narrowing at L2-L3, L3-L4, and L4-L5. The remaining disks are well maintained. There is no prevertebral or paraspinous edema. No epidural collection. The visualized spinal cord, conus medullaris and filum terminale   are normal. The cord ends at the L1 vertebral body level. Spinous processes are unremarkable.    T12-L1: No focal disk abnormality, canal stenosis or foramina narrowing.     L1-L2: No focal disk abnormality, canal stenosis or foramina narrowing.     L2-L3: No focal disk abnormality, canal stenosis or foramina narrowing.     L3-L4: There is postsurgical change from diskectomy. Today's study demonstrates a 0.9 x 0.5 cm posterior central disk protrusion which demonstrates diffuse enhancement consistent with postsurgical scarring. There is no evidence for residual or   recurrent disk. No significant spinal canal stenosis is identified at this level. No significant neural foramina narrowing.     L4-L5: There is a diffuse disk bulge and mild facet arthrosis with associated ligamentum flavum buckling without significant canal stenosis or foramina narrowing.     L5-S1: No focal disk abnormality, canal stenosis or foramina narrowing.     Visualized abdominal contents appear unremarkable. Posterior spinal musculature is normal.        Result Impression      *  Status post diskectomy at L3-L4 with interval improvement evident by no residual significant spinal canal stenosis or foramina narrowing. There is mild enhancing scar tissue but no evidence for recurrent/residual disk.    * Mild degenerative changes of the spine at other levels as described above unchanged from prior.    * Stable compression fracture of T12 status post vertebroplasty.  ______________________________________     Electronically signed by resident: Thiago Webb MD  Date: 01/25/13  Time: 14:51       Review of Systems   Constitutional: Negative.   HENT: Negative.   Eyes: Negative.   Respiratory: Negative.   Cardiovascular: Negative.   Gastrointestinal: Negative.   Endocrine: Negative.   Genitourinary: Negative.   Musculoskeletal: Positive for back pain, burning pain in feet  Skin: Negative.   Allergic/Immunologic: Negative.   Neurological: Negative.   Hematological: Negative.   Psychiatric/Behavioral: Negative.   25% cog capacity   Objective:     Physical Exam   Constitutional: He is oriented to person, place, and time. He appears well-developed and well-nourished.   Neurological: He is alert and oriented to person, place, and time. He has normal reflexes. No cranial nerve deficit. He exhibits normal muscle tone. Coordination normal.   R foot drop   Muscle Wasting of TA moiz (R>L)   High arched foot moiz       Assessment:     1.  Cervicalgia    2.  Facet joint disease of cervical region    3.  Occipital neuralgia    4.  L sided Headaches    5.  Cervical radiculopathy    6.  Paroxysmal hemicrania - interestingly this responds to timolol drops   Smells, reading and hot water exposure to the eye will trigger the headache       EMG   Summary   Nerve conduction studies of both lower extremities and the right upper extremity revealed absence of the left peroneal and bilateral tibial motor responses. The right peroneal CMAP amplitude was reduced.   Concentric needle examation of selected bilateral lower extremity  muscles demonstrated CRDs, positive sharp waves and fibrillation potentials in distal lower extremity muscles. Enlarged motor unit potentials with reduced recruitment were seen in the L5/S1-innervated muscles bilaterally. Lumbar paraspinal muscles were not examined due to the patients prior surgery.   Impression   This study is abnormal. There is electrophysiologic evidence for an acute on chronic, axonal, motor neuropathy vs. lumbosacral radiculopathy.      MRI C spine   Findings:  There is mild straightening of the normal cervical lordosis, likely related to patient positioning versus muscle spasm. Bone marrow signal demonstrates two small T1 hyperintense foci within the C4 and T2 vertebral bodies, likely hemangiomas. Bone marrow signal is otherwise unremarkable. Vertebral body heights are well-maintained. No acute fractures or dislocations.  Cerebellar tonsils are in their expected location. The cervicomedullary junction is normal. The cervical spinal cord demonstrates normal signal and contour.  C2-C3: No central canal stenosis or neural foraminal narrowing.  C3-C4: No central canal stenosis or neural foraminal narrowing.  C4-C5: No central canal stenosis or neural foraminal narrowing.  C5-C6: Bilateral uncovertebral and facet arthrosis result in mild bilateral neural foraminal narrowing.  C6-C7: Bilateral uncovertebral and facet arthrosis result in mild right neural foraminal narrowing. 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  C7-T1: No central canal stenosis or neural foraminal narrowing.  Limited evaluation of the soft tissues demonstrates nonvisualization of the left submandibular gland. Additionally, there is heterogeneous appearance of the left thyroid gland.       Plan:    1, cont Lamictal   2, Breakthrough headache - timolol drops   See Dr. Albert for possible atypical glaucoma with severe symptoms (possible  role of selegiline?) . Could this be corneal neuropathy?      Patient verbalized understanding to plan. I answered all his questions to his satisfaction.

## 2018-06-05 NOTE — LETTER
June 5, 2018      Nini Rendon MD  [address]  [facility] [address]           [facility] - Neurology Epilepsy  [address]  [facility] [address]  Phone: [phone]  Fax: [phone]          Patient: Raffy Rutherford Jr.   MR Number: [id number]   YOB: 1950   Date of Visit: 6/5/2018       Dear Dr. Nini Rendon:    Thank you for referring Rafyf Rutherford to me for evaluation. Attached you will find relevant portions of my assessment and plan of care.    If you have questions, please do not hesitate to call me. I look forward to following Raffy Rutherford along with you.    Sincerely,    Luther Parada MD    Enclosure  CC:  No Recipients    If you would like to receive this communication electronically, please contact [email or web address] or [phone] to request more information on [employer] Link access.    For providers and/or their staff who would like to refer a patient to [facility], please contact us through our one-stop-shop provider referral line, [facility], at [phone].    If you feel you have received this communication in error or would no longer like to receive these types of communications, please e-mail [email or web address]

## 2018-06-12 ENCOUNTER — TELEPHONE (OUTPATIENT)
Dept: DERMATOLOGY | Facility: CLINIC | Age: 68
End: 2018-06-12

## 2018-06-12 NOTE — TELEPHONE ENCOUNTER
Called PT in regards to appointment time. LVM.    ----- Message from Filipe Anguiano sent at 6/12/2018 10:12 AM CDT -----  Contact: Valarie (wife)    Name of Who is Calling: Valarie (wife)      What is the request in detail: Would like to speak with staff in regards to upcoming appointment. Doesn't want to reschedule just want to know if it would be okay to come in a little late if the eye doctor is running late. Please advise      Can the clinic reply by [facility]: yes      What Number to Call Back if not in [facility]: [phone]

## 2018-06-12 NOTE — TELEPHONE ENCOUNTER
----- Message from Filipe Anguiano sent at 6/12/2018 10:12 AM CDT -----  Contact: Valarie (wife)    Name of Who is Calling: Valarie (wife)      What is the request in detail: Would like to speak with staff in regards to upcoming appointment. Doesn't want to reschedule just want to know if it would be okay to come in a little late if the eye doctor is running late. Please advise      Can the clinic reply by [facility]: yes      What Number to Call Back if not in [facility]: [phone]

## 2018-06-14 DIAGNOSIS — M25.572 ACUTE LEFT ANKLE PAIN: Primary | ICD-10-CM

## 2018-06-14 DIAGNOSIS — M25.562 ACUTE PAIN OF LEFT KNEE: ICD-10-CM

## 2018-06-14 DIAGNOSIS — M25.552 HIP PAIN, LEFT: ICD-10-CM

## 2018-06-14 NOTE — PROGRESS NOTES
Pt fell at home yesterday and is having left hip, ankle, and knee pain. His wife is an employee here and requests xrays as patient is unable to come in for evaluation due to work schedule. Xrays ordered. Will f/u with the patient when results received.

## 2018-06-15 ENCOUNTER — HOSPITAL ENCOUNTER (OUTPATIENT)
Dept: RADIOLOGY | Facility: HOSPITAL | Age: 68
Discharge: HOME | End: 2018-06-15
Attending: NURSE PRACTITIONER
Payer: COMMERCIAL

## 2018-06-15 DIAGNOSIS — M25.552 HIP PAIN, LEFT: ICD-10-CM

## 2018-06-15 DIAGNOSIS — M25.572 ACUTE LEFT ANKLE PAIN: ICD-10-CM

## 2018-06-15 DIAGNOSIS — M25.562 ACUTE PAIN OF LEFT KNEE: ICD-10-CM

## 2018-06-15 PROCEDURE — 73610 X-RAY EXAM OF ANKLE: CPT | Mod: TC,LT

## 2018-06-15 PROCEDURE — 73562 X-RAY EXAM OF KNEE 3: CPT | Mod: TC,RT

## 2018-06-15 PROCEDURE — 73502 X-RAY EXAM HIP UNI 2-3 VIEWS: CPT | Mod: 26,LT,, | Performed by: RADIOLOGY

## 2018-06-15 PROCEDURE — 73562 X-RAY EXAM OF KNEE 3: CPT | Mod: 26,XS,RT, | Performed by: RADIOLOGY

## 2018-06-15 PROCEDURE — 73610 X-RAY EXAM OF ANKLE: CPT | Mod: 26,LT,, | Performed by: RADIOLOGY

## 2018-06-15 PROCEDURE — 73502 X-RAY EXAM HIP UNI 2-3 VIEWS: CPT | Mod: TC,LT

## 2018-06-15 PROCEDURE — 73564 X-RAY EXAM KNEE 4 OR MORE: CPT | Mod: 26,LT,, | Performed by: RADIOLOGY

## 2018-06-26 ENCOUNTER — INITIAL CONSULT (OUTPATIENT)
Dept: OPHTHALMOLOGY | Facility: CLINIC | Age: 68
End: 2018-06-26
Payer: COMMERCIAL

## 2018-06-26 ENCOUNTER — OFFICE VISIT (OUTPATIENT)
Dept: DERMATOLOGY | Facility: CLINIC | Age: 68
End: 2018-06-26
Payer: COMMERCIAL

## 2018-06-26 DIAGNOSIS — D18.00 ANGIOMA: ICD-10-CM

## 2018-06-26 DIAGNOSIS — Z96.1 STATUS POST CATARACT EXTRACTION AND INSERTION OF INTRAOCULAR LENS, UNSPECIFIED LATERALITY: ICD-10-CM

## 2018-06-26 DIAGNOSIS — Z98.49 STATUS POST CATARACT EXTRACTION AND INSERTION OF INTRAOCULAR LENS, UNSPECIFIED LATERALITY: ICD-10-CM

## 2018-06-26 DIAGNOSIS — D22.9 MULTIPLE BENIGN NEVI: ICD-10-CM

## 2018-06-26 DIAGNOSIS — L82.1 SEBORRHEIC KERATOSIS: Primary | ICD-10-CM

## 2018-06-26 DIAGNOSIS — H53.9 VISUAL DISTURBANCES: ICD-10-CM

## 2018-06-26 DIAGNOSIS — L73.8 SEBACEOUS HYPERPLASIA: ICD-10-CM

## 2018-06-26 DIAGNOSIS — R51.9 HEADACHE AROUND THE EYES: Primary | ICD-10-CM

## 2018-06-26 DIAGNOSIS — H18.452 SALZMANN'S NODULAR DEGENERATION OF CORNEA OF LEFT EYE: ICD-10-CM

## 2018-06-26 PROCEDURE — 99203 OFFICE O/P NEW LOW 30 MIN: CPT | Mod: S$GLB,,, | Performed by: DERMATOLOGY

## 2018-06-26 PROCEDURE — 92012 INTRM OPH EXAM EST PATIENT: CPT | Mod: S$GLB,,, | Performed by: OPHTHALMOLOGY

## 2018-06-26 PROCEDURE — 99999 PR PBB SHADOW E&M-EST. PATIENT-LVL II: CPT | Mod: PBBFAC,,, | Performed by: OPHTHALMOLOGY

## 2018-06-26 RX ORDER — TIMOLOL MALEATE 5 MG/ML
1 SOLUTION OPHTHALMIC DAILY
COMMUNITY
End: 2018-10-01 | Stop reason: SDUPTHER

## 2018-06-26 NOTE — LETTER
June 26, 2018      Luther Parada MD  [address]  [facility] [address]           [facility]  [address]  [facility] [address]  Phone: [phone]  Fax: [phone]          Patient: Raffy Rutherford Jr.   MR Number: [id number]   YOB: 1950   Date of Visit: 6/26/2018       Dear Dr. Luther Parada:    Thank you for referring Raffy Rutherford to me for evaluation. Attached you will find relevant portions of my assessment and plan of care.    If you have questions, please do not hesitate to call me. I look forward to following Raffy Rutherford along with you.    Sincerely,    Kevin Albert MD    Enclosure  CC:  No Recipients    If you would like to receive this communication electronically, please contact [email or web address] or [phone] to request more information on [employer] Link access.    For providers and/or their staff who would like to refer a patient to [facility], please contact us through our one-stop-shop provider referral line, [facility], at [phone].    If you feel you have received this communication in error or would no longer like to receive these types of communications, please e-mail [email or web address]

## 2018-06-26 NOTE — PROGRESS NOTES
HPI     Eye Pain    Additional comments: eye pain OS x's 8 months           Comments   cataract sx by Dr. Shawn EL03/26/2012  O.S. 05/14/2012  S/p repeat superficial keratectomy OS 12/11/17 - for salzmanns   S/p superficial keratectomy OS 8/18/17  - for RES   Anterior basement membrane dystrophy   Salzmann's nodular deg.os     Timolol PRN OS  AT prn OU    Pt complains of persistent headaches/pain in OS for 6-8 months. Pt states   pain scale is a 6.         Last edited by Kavin Jean on 6/26/2018  1:27 PM. (History)            Assessment /Plan     For exam results, see Encounter Report.    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 Parada manages ROD    --> Wife started Timolol OS  Some resolution of HA / Left eye pain  Relief can last days    Left eye  Javier BID --> RRR 60 / no Asthma --> discussed use chronic vs intermittent use --> per patient          [place]  In past - pt has had Va changes assoc with ABMD OS --> tx'ed with super K    Then had FBS assoc with salzmanns nodules OS --> tx'ed with super K  Today cornea pristine, no evidence of any pathology.  Concerned HA / eye pain more neurological.  Rec. eval with dr. Mcpherson.          [place]  In more remote past:    Patient frustrated with vision - glare may be improving, halos, colors jocelynn at night  Neri 1% q day and can use in AM --  Seems to help with some symptoms --> symptoms resolved and now off Neri for sometime    PC IOL centered in good position  limited PCO OU  Dilation causes worsening halos and glare         Plan  RTC with Dr Ocampo 1 year  RTc sooner prn with good  understanding

## 2018-06-26 NOTE — Clinical Note
Using topical Timolol for intermittent - chronic left HA and eye pain Seems to bring relief lasting days per patient & Wife  Haider / REBECCA BADILLO with Dr Ocampo

## 2018-06-26 NOTE — PROGRESS NOTES
Subjective:       Patient ID:  Raffy Rutherford Jr. is a 68 y.o. male who presents for   Chief Complaint   Patient presents with    Skin Check     TBSE    Growth     L jaw, 3 weeks, no symptoms, no prev TX     Pt is here today for TBSE with a c/o of growth to his L jaw. Pt states that it has been present for about 3 week. No symptoms or prev TX      Growth  - Initial  Affected locations: face  Duration: 3 weeks  Signs / symptoms: asymptomatic  Severity: mild  Aggravated by: nothing  Relieving factors/Treatments tried: nothing  Improvement on treatment: no relief        Review of Systems   Skin: Negative for daily sunscreen use, activity-related sunscreen use, recent sunburn and wears hat.   Hematologic/Lymphatic: Bruises/bleeds easily.        Objective:    Physical Exam   Constitutional: He appears well-developed and well-nourished. No distress.   Neurological: He is alert and oriented to person, place, and time. He is not disoriented.   Psychiatric: He has a normal mood and affect.   Skin:   Areas Examined (abnormalities noted in diagram):   Scalp / Hair Palpated and Inspected  Head / Face Inspection Performed  Neck Inspection Performed  Chest / Axilla Inspection Performed  Abdomen Inspection Performed  Genitals / Buttocks / Groin Inspection Performed  Back Inspection Performed  RUE Inspected  LUE Inspection Performed  RLE Inspected  LLE Inspection Performed  Nails and Digits Inspection Performed                   Diagram Legend     Erythematous scaling macule/papule c/w actinic keratosis       Vascular papule c/w angioma      Pigmented verrucoid papule/plaque c/w seborrheic keratosis      Yellow umbilicated papule c/w sebaceous hyperplasia      Irregularly shaped tan macule c/w lentigo     1-2 mm smooth white papules consistent with Milia      Movable subcutaneous cyst with punctum c/w epidermal inclusion cyst      Subcutaneous movable cyst c/w pilar cyst      Firm pink to brown papule c/w dermatofibroma       Pedunculated fleshy papule(s) c/w skin tag(s)      Evenly pigmented macule c/w junctional nevus     Mildly variegated pigmented, slightly irregular-bordered macule c/w mildly atypical nevus      Flesh colored to evenly pigmented papule c/w intradermal nevus       Pink pearly papule/plaque c/w basal cell carcinoma      Erythematous hyperkeratotic cursted plaque c/w SCC      Surgical scar with no sign of skin cancer recurrence      Open and closed comedones      Inflammatory papules and pustules      Verrucoid papule consistent consistent with wart     Erythematous eczematous patches and plaques     Dystrophic onycholytic nail with subungual debris c/w onychomycosis     Umbilicated papule    Erythematous-base heme-crusted tan verrucoid plaque consistent with inflamed seborrheic keratosis     Erythematous Silvery Scaling Plaque c/w Psoriasis     See annotation      Assessment / Plan:        Seborrheic keratosis  These are benign, inherited growths without a malignant potential. Reassurance given to patient. No treatment is necessary. Handout was provided.    Angioma  This is a benign vascular lesion. Reassurance given. No treatment required.    Multiple benign nevi  Multiple benign-appearing nevi present on exam today. Reassurance provided. Counseled patient to periodically examine moles and return to clinic if any moles change or become symptomatic.    Sebaceous hyperplasia  This is a common condition representing benign enlargement of oil glands. It typically occurs in adulthood. Reassurance was given to the patient. No treatment required.      Follow-up in about 1 year (around 6/26/2019) for TBSE, or sooner if any new problems or changing lesions.

## 2018-07-09 RX ORDER — LAMOTRIGINE 100 MG/1
100 TABLET ORAL DAILY
Qty: 90 TABLET | Refills: 0 | Status: SHIPPED | OUTPATIENT
Start: 2018-07-09 | End: 2018-10-01

## 2018-07-23 RX ORDER — TIMOLOL MALEATE 5 MG/ML
SOLUTION/ DROPS OPHTHALMIC
Qty: 5 ML | Refills: 1 | Status: SHIPPED | OUTPATIENT
Start: 2018-07-23 | End: 2018-10-01

## 2018-10-01 ENCOUNTER — OFFICE VISIT (OUTPATIENT)
Dept: OPTOMETRY | Facility: CLINIC | Age: 68
End: 2018-10-01
Payer: COMMERCIAL

## 2018-10-01 DIAGNOSIS — H57.12 PAIN IN AND AROUND EYE, LEFT: ICD-10-CM

## 2018-10-01 DIAGNOSIS — H04.123 DRY EYE SYNDROME, BILATERAL: Primary | ICD-10-CM

## 2018-10-01 PROCEDURE — 99999 PR PBB SHADOW E&M-EST. PATIENT-LVL II: CPT | Mod: PBBFAC,,, | Performed by: OPTOMETRIST

## 2018-10-01 PROCEDURE — 92014 COMPRE OPH EXAM EST PT 1/>: CPT | Mod: S$GLB,,, | Performed by: OPTOMETRIST

## 2018-10-01 RX ORDER — HYDROCODONE BITARTRATE AND ACETAMINOPHEN 5; 325 MG/1; MG/1
1 TABLET ORAL EVERY 6 HOURS
Refills: 0 | COMMUNITY
Start: 2018-08-16 | End: 2018-11-13 | Stop reason: SDUPTHER

## 2018-10-01 RX ORDER — TIMOLOL MALEATE 5 MG/ML
1 SOLUTION OPHTHALMIC DAILY
Qty: 5 ML | Refills: 4 | Status: SHIPPED | OUTPATIENT
Start: 2018-10-01 | End: 2019-02-05

## 2018-10-05 NOTE — PROGRESS NOTES
HPI     Last Eye Exam: 5/29/2017 w/ Dr. Jackie Ocampo.    Pt here for routine eye exam (no contact lenses).    SYMPTOMS:  (+) Eye pain/discomfort:  --5/10 os only--sometimes it can get up to an 8/10. Pt states that he was   referred to Dr. Harris (no improvement).  (+) Blurry Vision w/ current spectacles at distance. Pt states can see   distance better w/out spectacles.  (--) Double Vision  (--) Itchy Eyes:  (--) Watery Eyes  (+) Dry Eyes  (+) Floaters/Black Spots: had floaters for several yrs--no black spots  (+) Headaches left side of head only.  (--) Photophobia  ---------------------------------------------------------------------------    EYE MEDS:  otc drops for dry eye relief (brand name unknown).  ---------------------------------------------------------------------------    LENSES:  Glasses: PALs  Contacts: none      Last edited by Guille Hatfield on 10/1/2018 10:38 AM. (History)            Assessment /Plan     For exam results, see Encounter Report.    Dry eye syndrome, bilateral  Pain in and around eye, left   Use artificial tears BID   Use  timolol maleate 0.5% (TIMOPTIC-XE) 0.5 % SolG; Place 1 drop into the left eye once daily.  Dispense: 5 mL; Refill: 4   Use drops regularly/ daily    Tried xiidra: no improvement d/c 2/2 burning  Tried restasis: no improvement d/c 2/2 ear ringing      cataract sx by Dr. Shawn EL03/26/2012  O.S. 05/14/2012  S/p repeat superficial keratectomy OS 12/11/17 - for salzmanns   S/p superficial keratectomy OS 8/18/17  - for RES   Anterior basement membrane dystrophy   Salzmann's nodular deg.os         Pt complains of persistent headaches/pain in OS for 6-8 months. Pt states   pain scale is a 6.      RTC 1-2 months for follow up

## 2018-10-09 ENCOUNTER — TELEPHONE (OUTPATIENT)
Dept: INTERNAL MEDICINE | Facility: CLINIC | Age: 68
End: 2018-10-09

## 2018-10-09 DIAGNOSIS — E78.00 PURE HYPERCHOLESTEROLEMIA: ICD-10-CM

## 2018-10-09 DIAGNOSIS — Z00.00 WELLNESS EXAMINATION: Primary | ICD-10-CM

## 2018-10-09 DIAGNOSIS — Z12.5 SCREENING FOR PROSTATE CANCER: ICD-10-CM

## 2018-10-09 NOTE — TELEPHONE ENCOUNTER
----- Message from Valarie Rutherford, CNS sent at 10/9/2018  8:16 AM CDT -----  Regarding: annual  visit and labs  Dr Justice Akbar has his annual next Monday 15th at 8:30  Is it possible to have his annual labs done that am before he sees you ?

## 2018-10-15 ENCOUNTER — LAB VISIT (OUTPATIENT)
Dept: LAB | Facility: HOSPITAL | Age: 68
End: 2018-10-15
Attending: INTERNAL MEDICINE
Payer: COMMERCIAL

## 2018-10-15 ENCOUNTER — OFFICE VISIT (OUTPATIENT)
Dept: INTERNAL MEDICINE | Facility: CLINIC | Age: 68
End: 2018-10-15
Payer: COMMERCIAL

## 2018-10-15 ENCOUNTER — TELEPHONE (OUTPATIENT)
Dept: INTERNAL MEDICINE | Facility: CLINIC | Age: 68
End: 2018-10-15

## 2018-10-15 VITALS
SYSTOLIC BLOOD PRESSURE: 126 MMHG | HEIGHT: 68 IN | BODY MASS INDEX: 26.69 KG/M2 | DIASTOLIC BLOOD PRESSURE: 84 MMHG | WEIGHT: 176.13 LBS | HEART RATE: 86 BPM | OXYGEN SATURATION: 96 %

## 2018-10-15 DIAGNOSIS — E78.00 PURE HYPERCHOLESTEROLEMIA: ICD-10-CM

## 2018-10-15 DIAGNOSIS — R97.20 ELEVATED PSA: ICD-10-CM

## 2018-10-15 DIAGNOSIS — H93.19 TINNITUS, UNSPECIFIED LATERALITY: ICD-10-CM

## 2018-10-15 DIAGNOSIS — M54.31 RIGHT SIDED SCIATICA: ICD-10-CM

## 2018-10-15 DIAGNOSIS — M21.371 RIGHT FOOT DROP: ICD-10-CM

## 2018-10-15 DIAGNOSIS — Z00.00 WELLNESS EXAMINATION: ICD-10-CM

## 2018-10-15 DIAGNOSIS — Z12.11 COLON CANCER SCREENING: ICD-10-CM

## 2018-10-15 DIAGNOSIS — Z00.00 PHYSICAL EXAM: Primary | ICD-10-CM

## 2018-10-15 DIAGNOSIS — M21.372 LEFT FOOT DROP: ICD-10-CM

## 2018-10-15 DIAGNOSIS — Z12.5 SCREENING FOR PROSTATE CANCER: ICD-10-CM

## 2018-10-15 DIAGNOSIS — K21.9 GASTROESOPHAGEAL REFLUX DISEASE WITHOUT ESOPHAGITIS: ICD-10-CM

## 2018-10-15 DIAGNOSIS — M54.5 LOW BACK PAIN, UNSPECIFIED BACK PAIN LATERALITY, UNSPECIFIED CHRONICITY, WITH SCIATICA PRESENCE UNSPECIFIED: ICD-10-CM

## 2018-10-15 LAB
ALBUMIN SERPL BCP-MCNC: 4.2 G/DL
ALP SERPL-CCNC: 42 U/L
ALT SERPL W/O P-5'-P-CCNC: 25 U/L
ANION GAP SERPL CALC-SCNC: 11 MMOL/L
AST SERPL-CCNC: 20 U/L
BASOPHILS # BLD AUTO: 0.03 K/UL
BASOPHILS NFR BLD: 0.5 %
BILIRUB SERPL-MCNC: 0.6 MG/DL
BUN SERPL-MCNC: 15 MG/DL
CALCIUM SERPL-MCNC: 9.3 MG/DL
CHLORIDE SERPL-SCNC: 102 MMOL/L
CHOLEST SERPL-MCNC: 299 MG/DL
CHOLEST/HDLC SERPL: 4.5 {RATIO}
CO2 SERPL-SCNC: 26 MMOL/L
CREAT SERPL-MCNC: 0.8 MG/DL
DIFFERENTIAL METHOD: NORMAL
EOSINOPHIL # BLD AUTO: 0.2 K/UL
EOSINOPHIL NFR BLD: 3.5 %
ERYTHROCYTE [DISTWIDTH] IN BLOOD BY AUTOMATED COUNT: 12.4 %
EST. GFR  (AFRICAN AMERICAN): >60 ML/MIN/1.73 M^2
EST. GFR  (NON AFRICAN AMERICAN): >60 ML/MIN/1.73 M^2
ESTIMATED AVG GLUCOSE: 111 MG/DL
GLUCOSE SERPL-MCNC: 105 MG/DL
HBA1C MFR BLD HPLC: 5.5 %
HCT VFR BLD AUTO: 46.1 %
HDLC SERPL-MCNC: 66 MG/DL
HDLC SERPL: 22.1 %
HGB BLD-MCNC: 15.4 G/DL
LDLC SERPL CALC-MCNC: 202.4 MG/DL
LYMPHOCYTES # BLD AUTO: 3 K/UL
LYMPHOCYTES NFR BLD: 45.8 %
MCH RBC QN AUTO: 31 PG
MCHC RBC AUTO-ENTMCNC: 33.4 G/DL
MCV RBC AUTO: 93 FL
MONOCYTES # BLD AUTO: 0.6 K/UL
MONOCYTES NFR BLD: 8.8 %
NEUTROPHILS # BLD AUTO: 2.7 K/UL
NEUTROPHILS NFR BLD: 41.1 %
NONHDLC SERPL-MCNC: 233 MG/DL
PLATELET # BLD AUTO: 225 K/UL
PMV BLD AUTO: 9.6 FL
POTASSIUM SERPL-SCNC: 4.4 MMOL/L
PROT SERPL-MCNC: 7.4 G/DL
RBC # BLD AUTO: 4.97 M/UL
SODIUM SERPL-SCNC: 139 MMOL/L
TRIGL SERPL-MCNC: 153 MG/DL
TSH SERPL DL<=0.005 MIU/L-ACNC: 0.66 UIU/ML
WBC # BLD AUTO: 6.48 K/UL

## 2018-10-15 PROCEDURE — 84443 ASSAY THYROID STIM HORMONE: CPT

## 2018-10-15 PROCEDURE — 90472 IMMUNIZATION ADMIN EACH ADD: CPT | Mod: S$GLB,,, | Performed by: INTERNAL MEDICINE

## 2018-10-15 PROCEDURE — 99397 PER PM REEVAL EST PAT 65+ YR: CPT | Mod: 25,S$GLB,, | Performed by: INTERNAL MEDICINE

## 2018-10-15 PROCEDURE — 36415 COLL VENOUS BLD VENIPUNCTURE: CPT

## 2018-10-15 PROCEDURE — 85025 COMPLETE CBC W/AUTO DIFF WBC: CPT

## 2018-10-15 PROCEDURE — 83036 HEMOGLOBIN GLYCOSYLATED A1C: CPT

## 2018-10-15 PROCEDURE — 80061 LIPID PANEL: CPT

## 2018-10-15 PROCEDURE — 99999 PR PBB SHADOW E&M-EST. PATIENT-LVL V: CPT | Mod: PBBFAC,,, | Performed by: INTERNAL MEDICINE

## 2018-10-15 PROCEDURE — 90670 PCV13 VACCINE IM: CPT | Mod: S$GLB,,, | Performed by: INTERNAL MEDICINE

## 2018-10-15 PROCEDURE — 90471 IMMUNIZATION ADMIN: CPT | Mod: S$GLB,,, | Performed by: INTERNAL MEDICINE

## 2018-10-15 PROCEDURE — 80053 COMPREHEN METABOLIC PANEL: CPT

## 2018-10-15 PROCEDURE — 90662 IIV NO PRSV INCREASED AG IM: CPT | Mod: S$GLB,,, | Performed by: INTERNAL MEDICINE

## 2018-10-15 RX ORDER — BUTALBITAL, ACETAMINOPHEN AND CAFFEINE 50; 325; 40 MG/1; MG/1; MG/1
1 TABLET ORAL DAILY PRN
Qty: 30 TABLET | Refills: 3 | Status: SHIPPED | OUTPATIENT
Start: 2018-10-15 | End: 2019-04-15

## 2018-10-15 RX ORDER — BUTALBITAL, ASPIRIN, AND CAFFEINE 325; 50; 40 MG/1; MG/1; MG/1
1 CAPSULE ORAL DAILY PRN
Qty: 30 CAPSULE | Refills: 3 | Status: SHIPPED | OUTPATIENT
Start: 2018-10-15 | End: 2018-12-15

## 2018-10-15 RX ORDER — CLONAZEPAM 0.5 MG/1
0.5 TABLET ORAL 2 TIMES DAILY PRN
Qty: 60 TABLET | Refills: 2 | Status: SHIPPED | OUTPATIENT
Start: 2018-10-15 | End: 2019-03-19 | Stop reason: SDUPTHER

## 2018-10-15 NOTE — TELEPHONE ENCOUNTER
----- Message from Sita Ahuja sent at 10/15/2018 10:18 AM CDT -----  Contact: Nadine/[facility]/[phone]  Nadine called in regards needing to talk with Dr Rendon medical assistant about patient was place in two medication butalbital-acetaminophen-caffeine -40 mg (FIORICET, ESGIC) -40 mg per tablet, butalbital-aspirin-caffeine -40 mg (FIORINAL) -40 mg Cap. Pharmacy want to make sure if PCP want patient to take both medication. Please call and advise. Thank you!!!

## 2018-10-15 NOTE — PROGRESS NOTES
Subjective:      Patient ID: Raffy Rutherford Jr. is a 68 y.o. male.    Chief Complaint: Annual Exam    HPI:  HPI   Patient had multiple issue to discuss and are outlined below.    Annual exam: 10/15/2018  Colonoscopy 5/5/2014 : 3 polyps 5-10 years due 2019 ordered  Endoscopy 4/2015 pathology: ordered  Optho: Cataract surgery with continued visual changes: Dr. Ocampo  Tinnitus: discussed options and patient has had follow up  Vertigo   Flu: due  Tetanus: done  Shingles: done, discussed Shingrix  Pneumovax : due in one year  Prevnar: Prevnar    We reviewed or discuss the following issues    Patient continues to see Dr. Jacob at least yearly for neurologic problems which she believes related to his back or at least he tells me he is not aware of another diagnosis.  The symptoms seem to be the right back the right leg right foot drop which is greater than left and muscle weakness.  Patient did have surgery on 07/29/2015 and was evaluated for spinal cord stimulator in February of 2017.    Patient states that he is use braces, special shoes, therapy most of which he has researched on his own.  He wonders if there are any other options.    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    Patient does have GERD despite medicine generally in his upper chest he is due for a colonoscopy soon and likely in the EGD we discussed    Patient tells me he has been evaluated for and the inguinal hernia and will contact the surgeon when he is ready to have surgery    His tinnitus is worsening he wonders whether there is any new options as this is interfering with his playing of his music.    He still continues to work in tells me he enjoys this.    He continues to see his psychiatrist.    He has been also of cold in chills that have not resulted in any significant long-term problem but he is uncertain with this might  be.         Patient Active Problem List   Diagnosis    Depression    Hyperlipidemia    Chronic pain    Colon polyp    Adenomatous polyp    History of prostate biopsy    GERD (gastroesophageal reflux disease)    Back pain    Sleep apnea    Visual disturbances    Spondylosis without myelopathy    Degeneration of lumbar or lumbosacral intervertebral disc    Spinal stenosis, lumbar region, without neurogenic claudication    Thoracic or lumbosacral neuritis or radiculitis, unspecified    Displacement of lumbar intervertebral disc without myelopathy    Acquired spondylolisthesis    Lumbago    Status post cataract extraction and insertion of intraocular lens - Both Eyes    Prostatitis, acute    Fibromyalgia    OP (osteoporosis)    Compression fracture of T12 vertebra    Diverticulitis large intestine    Osteoarthritis    Lower back pain    Lower extremity pain    Decreased strength    Range of motion deficit    Special screening for malignant neoplasms, colon    Cervicalgia    Facet joint disease of cervical region    Occipital neuralgia    Chronic migraine without aura, with intractable migraine, so stated, with status migrainosus    Cervical radiculopathy    Paroxysmal hemicrania    Sciatic nerve pain    Chronic LBP    DJD (degenerative joint disease) of lumbar spine    Chronic neck pain    Right lumbar radiculopathy    Thyroid nodule    Carpal tunnel syndrome of right wrist    Paresthesia    Brow ptosis    Degenerative disc disease    Encounter for postoperative wound check    Lumbar radiculopathy    Cervical spondylosis    Low back pain without sciatica    Lumbar radicular pain    S/P lumbar spinal fusion    Gait abnormality    Impaired functional mobility, balance, gait, and endurance    Left hip pain    Right wrist tendinitis    Pain in right wrist    Difficulty walking    Weakness    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    Headache around  the eyes     Past Medical History:   Diagnosis Date    Adenomatous polyp 2003    Adenomatous polyp     Allergy     Arthritis     Back pain     after trauma beginning in 195    Cataract     Chronic pain     neck and left shoulder    Cluster headache 2013    Colon polyp     Degenerative disc disease     Depression     Diverticulitis 12/2013    Elevated PSA     Fibromyalgia 2013    GERD (gastroesophageal reflux disease)     Hepatitis 1970's    A    History of prostate biopsy 2002    Hyperlipidemia     Joint pain     Sleep apnea     Special screening for malignant neoplasms, colon 5/5/2014    Thyroid nodule 7/16/2014    Visual disturbance 2012    problems after cataract surgery     Past Surgical History:   Procedure Laterality Date    BACK SURGERY      BLEPHAROPLASTY UPPER LID Bilateral 2/10/2015    Performed by Rhett Lainez III, MD at [facility] OR 2ND FLR    BLOCK, GANGLION IMPAR N/A 6/25/2013    [facility]    CATARACT EXTRACTION W/  INTRAOCULAR LENS IMPLANT Bilateral     CHOLECYSTECTOMY      COLONOSCOPY N/A 5/5/2014    Performed by Pedro Carlos MD at [facility] ENDO (4TH FLR)    COSMETIC SURGERY  2/10/2015    Direct mid-forehead brow lift    COSMETIC SURGERY  2/10/2015    Bilateral upper lid blepahroplasty    ESOPHAGOGASTRODUODENOSCOPY (EGD) N/A 4/28/2015    Performed by Amadou Hardin MD at [facility] ENDO (4TH FLR)    EYE SURGERY      FUSION EXTREME LATERAL N/A 6/22/2015    Performed by Milad Browne MD at [facility] OR 2ND FLR    HEMORRHOID SURGERY      with complication of chronic bleeding for 6 weeks     JOINT REPLACEMENT      KNEE SURGERY      involving arthroscopic surgery to both knees    LIFT-BROW midforehead direct Bilateral 2/10/2015    Performed by Rhett Lainez III, MD at [facility] OR 2ND FLR    SINUS SURGERY      SINUS SURGERY      left molar and sinus surgery for trigeminal neuralgia    SPINAL FUSION  6/22/2015    L3-L5 XLIF    SPINE  "SURGERY  6/22/15    OMEGA/TANA    TOTAL HIP ARTHROPLASTY  4/2012    Pt states he had total hip replacement on his left hip.     Family History   Problem Relation Age of Onset    Cancer Mother         colon; uterine    Nephrolithiasis Mother     Stroke Mother 86    Pancreatitis Brother     Vision loss Brother         macular degeneration    Diabetes Brother     Macular degeneration Brother     Migraines Sister     No Known Problems Father     No Known Problems Maternal Grandmother     No Known Problems Maternal Grandfather     No Known Problems Paternal Grandmother     No Known Problems Paternal Grandfather     No Known Problems Sister     No Known Problems Maternal Aunt     No Known Problems Maternal Uncle     No Known Problems Paternal Aunt     No Known Problems Paternal Uncle     Melanoma Neg Hx     Amblyopia Neg Hx     Blindness Neg Hx     Cataracts Neg Hx     Glaucoma Neg Hx     Hypertension Neg Hx     Retinal detachment Neg Hx     Strabismus Neg Hx     Thyroid disease Neg Hx     Allergic rhinitis Neg Hx     Allergies Neg Hx     Angioedema Neg Hx     Asthma Neg Hx     Eczema Neg Hx     Urticaria Neg Hx     Rhinitis Neg Hx     Immunodeficiency Neg Hx     Atopy Neg Hx      Review of Systems: No cardiac symptoms, No pulmonary symptoms  Objective:     Vitals:    10/15/18 0827   BP: 126/84   Pulse: 86   SpO2: 96%   Weight: 79.9 kg (176 lb 2.4 oz)   Height: 5' 8" (1.727 m)   PainSc:   5   PainLoc: Leg     Body mass index is 26.78 kg/m².  Physical Exam   Constitutional: He is oriented to person, place, and time. He appears well-developed and well-nourished. No distress.   Neck: Carotid bruit is not present. No thyromegaly present.   Cardiovascular: Normal rate, regular rhythm and normal heart sounds. PMI is not displaced.   Pulmonary/Chest: Effort normal and breath sounds normal. No respiratory distress.   Abdominal: Soft. Bowel sounds are normal. He exhibits no distension. There " is no tenderness.   Musculoskeletal: He exhibits no edema.   Neurological: He is alert and oriented to person, place, and time.     Assessment:     1. Physical exam    2. Right foot drop    3. Right sided sciatica    4. Low back pain, unspecified back pain laterality, unspecified chronicity, with sciatica presence unspecified    5. Left foot drop    6. Gastroesophageal reflux disease without esophagitis    7. Colon cancer screening    8. Tinnitus, unspecified laterality    9. Elevated PSA      Plan:   Raffy was seen today for annual exam.    Diagnoses and all orders for this visit:    Physical exam:  Updated and reviewed    Right foot drop:  The consult is being placed to Neurosurgery for an opinion as to who should follow this patient's neurologic symptoms and what rehabilitation or therapy may help long term  -     Ambulatory consult to Neurosurgery    Right sided sciatica  -     Ambulatory consult to Neurosurgery    Low back pain, unspecified back pain laterality, unspecified chronicity, with sciatica presence unspecified  -     Ambulatory consult to Neurosurgery    Left foot drop  -     Ambulatory consult to Neurosurgery    Gastroesophageal reflux disease without esophagitis  -     Case request GI: ESOPHAGOGASTRODUODENOSCOPY (EGD)    Colon cancer screening  -     Case request GI: COLONOSCOPY    Tinnitus, unspecified laterality  -     Ambulatory consult to ENT    Elevated PSA  -     Ambulatory consult to Urology    Other orders  -     Influenza - High Dose (65+) (PF) (IM)  -     (In Office Administered) Pneumococcal Conjugate Vaccine (13 Valent) (IM)  -     clonazePAM (KLONOPIN) 0.5 MG tablet; Take 1 tablet (0.5 mg total) by mouth 2 (two) times daily as needed for Anxiety.  -     butalbital-acetaminophen-caffeine -40 mg (FIORICET, ESGIC) -40 mg per tablet; Take 1 tablet by mouth daily as needed for Pain. headache  -     butalbital-aspirin-caffeine -40 mg (FIORINAL) -40 mg Cap; Take 1  capsule by mouth daily as needed. headache        Problem List Items Addressed This Visit     GERD (gastroesophageal reflux disease)    Relevant Orders    Case request GI: ESOPHAGOGASTRODUODENOSCOPY (EGD) (Completed)    Lumbago    Relevant Orders    Ambulatory consult to Neurosurgery      Other Visit Diagnoses     Physical exam    -  Primary    Right foot drop        Relevant Orders    Ambulatory consult to Neurosurgery    Right sided sciatica        Relevant Orders    Ambulatory consult to Neurosurgery    Left foot drop        Relevant Orders    Ambulatory consult to Neurosurgery    Colon cancer screening        Relevant Orders    Case request GI: COLONOSCOPY (Completed)    Tinnitus, unspecified laterality        Relevant Orders    Ambulatory consult to ENT    Elevated PSA        Relevant Orders    Ambulatory consult to Urology        Orders Placed This Encounter   Procedures    Influenza - High Dose (65+) (PF) (IM)    (In Office Administered) Pneumococcal Conjugate Vaccine (13 Valent) (IM)    Ambulatory consult to Neurosurgery     Referral Priority:   Routine     Referral Type:   Consultation     Referral Reason:   Specialty Services Required     Requested Specialty:   Neurosurgery     Number of Visits Requested:   1    Ambulatory consult to ENT     Referral Priority:   Routine     Referral Type:   Consultation     Referral Reason:   Specialty Services Required     Referred to Provider:   Ryan Rainey MD     Requested Specialty:   Otolaryngology     Number of Visits Requested:   1    Ambulatory consult to Urology     Referral Priority:   Routine     Referral Type:   Consultation     Referral Reason:   Specialty Services Required     Requested Specialty:   Urology     Number of Visits Requested:   1    Case request GI: ESOPHAGOGASTRODUODENOSCOPY (EGD)     Order Specific Question:   CPT Code:     Answer:   OK UPPER GI ENDOSCOPY,DIAGNOSIS [90953]     Order Specific Question:   Case Referring Provider      Answer:   DEMETRICE RENDON [[id number]]    Case request GI: COLONOSCOPY     Order Specific Question:   Pre-op Diagnosis     Answer:   Screening [659176]     Order Specific Question:   CPT Code:     Answer:   [place] COLORECTAL CANCER SCREEN RESULTS DOCUMENT/REVIEW [3017F]     Order Specific Question:   Case Referring Provider     Answer:   DEMETRICE RENDON [[id number]]     Follow-up in about 6 weeks (around 11/26/2018).     Medication List           Accurate as of 10/15/18  6:21 PM. If you have any questions, ask your nurse or doctor.               START taking these medications    butalbital-acetaminophen-caffeine -40 mg -40 mg per tablet  Commonly known as:  FIORICET, ESGIC  Take 1 tablet by mouth daily as needed for Pain. headache  Started by:  Demetrice Rendon MD     butalbital-aspirin-caffeine -40 mg -40 mg Cap  Commonly known as:  FIORINAL  Take 1 capsule by mouth daily as needed. headache  Started by:  Demetrice Rendon MD        CHANGE how you take these medications    clonazePAM 0.5 MG tablet  Commonly known as:  KLONOPIN  Take 1 tablet (0.5 mg total) by mouth 2 (two) times daily as needed for Anxiety.  What changed:    · medication strength  · how much to take  · when to take this  · reasons to take this  Changed by:  Demetrice Rendon MD     * traZODone 100 MG tablet  Commonly known as:  DESYREL  Take 2 tablets (200 mg total) by mouth every evening.  What changed:  how much to take     * traZODone 100 MG tablet  Commonly known as:  DESYREL  Take one tablet by mouth every night at bedtime  What changed:  Another medication with the same name was changed. Make sure you understand how and when to take each.         * This list has 2 medication(s) that are the same as other medications prescribed for you. Read the directions carefully, and ask your doctor or other care provider to review them with you.            CONTINUE taking these medications    fish oil-omega-3 fatty acids 300-1,000 mg capsule      HYDROcodone-acetaminophen 5-325 mg per tablet  Commonly known as:  NORCO     lamoTRIgine 200 MG tablet  Commonly known as:  LAMICTAL  Take one tablet by mouth once daily     RABEprazole 20 mg tablet  Commonly known as:  ACIPHEX  Take 1 tablet (20 mg total) by mouth 2 (two) times daily.     * selegiline 12 mg/24 hr  Commonly known as:  EMSAM  Place 1 patch onto the skin once daily.     * EMSAM 12 mg/24 hr  Generic drug:  selegiline  Place 1 patch onto the skin once daily.     senna-docusate 8.6-50 mg 8.6-50 mg per tablet  Commonly known as:  PERICOLACE  Take 2 tablets by mouth nightly as needed for Constipation.     sucralfate 1 gram tablet  Commonly known as:  CARAFATE     tetracaine HCL 0.5% 0.5 % ophthalmic solution  Commonly known as:  PONTOCAINE  Apply 1 drop into the affected eye as directed     timolol maleate 0.5% 0.5 % Solg  Commonly known as:  TIMOPTIC-XE  Place 1 drop into the left eye once daily.     UNABLE TO FIND  medication name: cefaly + electrodes, wear 20-30 mins, Dx: migraine     VOLTAREN 1 % Gel  Generic drug:  diclofenac sodium         * This list has 2 medication(s) that are the same as other medications prescribed for you. Read the directions carefully, and ask your doctor or other care provider to review them with you.               Where to Get Your Medications      These medications were sent to [facility]  [address] [facility] [address]    Hours:  Mon-Fri 7a-7p, Sat 10a-4p, Sun 10a-4p Phone:  [phone]   · butalbital-acetaminophen-caffeine -40 mg -40 mg per tablet  · butalbital-aspirin-caffeine -40 mg -40 mg Cap  · clonazePAM 0.5 MG tablet

## 2018-10-16 ENCOUNTER — PATIENT MESSAGE (OUTPATIENT)
Dept: INTERNAL MEDICINE | Facility: CLINIC | Age: 68
End: 2018-10-16

## 2018-10-17 DIAGNOSIS — M21.379 FOOT-DROP, UNSPECIFIED LATERALITY: Primary | ICD-10-CM

## 2018-10-17 RX ORDER — PRAVASTATIN SODIUM 40 MG/1
40 TABLET ORAL DAILY
Qty: 90 TABLET | Refills: 3 | Status: SHIPPED | OUTPATIENT
Start: 2018-10-17 | End: 2019-10-31 | Stop reason: SDUPTHER

## 2018-10-17 NOTE — PROGRESS NOTES
Pt with history of foot drop. He has a plastic brace, but would like to upgrade to the carbon fiber as the plastic one has worn out.

## 2018-10-22 ENCOUNTER — OFFICE VISIT (OUTPATIENT)
Dept: UROLOGY | Facility: CLINIC | Age: 68
End: 2018-10-22
Payer: COMMERCIAL

## 2018-10-22 ENCOUNTER — CLINICAL SUPPORT (OUTPATIENT)
Dept: AUDIOLOGY | Facility: CLINIC | Age: 68
End: 2018-10-22
Payer: COMMERCIAL

## 2018-10-22 ENCOUNTER — OFFICE VISIT (OUTPATIENT)
Dept: OTOLARYNGOLOGY | Facility: CLINIC | Age: 68
End: 2018-10-22
Payer: COMMERCIAL

## 2018-10-22 ENCOUNTER — OFFICE VISIT (OUTPATIENT)
Dept: ORTHOPEDICS | Facility: CLINIC | Age: 68
End: 2018-10-22
Payer: COMMERCIAL

## 2018-10-22 VITALS
HEIGHT: 69 IN | HEIGHT: 69 IN | WEIGHT: 176.13 LBS | BODY MASS INDEX: 26.07 KG/M2 | SYSTOLIC BLOOD PRESSURE: 111 MMHG | HEART RATE: 52 BPM | DIASTOLIC BLOOD PRESSURE: 74 MMHG | BODY MASS INDEX: 26.09 KG/M2 | WEIGHT: 176 LBS

## 2018-10-22 VITALS — WEIGHT: 177 LBS | BODY MASS INDEX: 26.14 KG/M2

## 2018-10-22 DIAGNOSIS — H61.23 BILATERAL IMPACTED CERUMEN: Primary | ICD-10-CM

## 2018-10-22 DIAGNOSIS — N40.1 BPH WITH URINARY OBSTRUCTION: Primary | ICD-10-CM

## 2018-10-22 DIAGNOSIS — N13.8 BPH WITH URINARY OBSTRUCTION: Primary | ICD-10-CM

## 2018-10-22 DIAGNOSIS — M21.372 BILATERAL FOOT-DROP: Primary | ICD-10-CM

## 2018-10-22 DIAGNOSIS — M21.371 BILATERAL FOOT-DROP: Primary | ICD-10-CM

## 2018-10-22 DIAGNOSIS — M17.12 PRIMARY OSTEOARTHRITIS OF LEFT KNEE: ICD-10-CM

## 2018-10-22 DIAGNOSIS — H93.11 SUBJECTIVE TINNITUS OF RIGHT EAR: ICD-10-CM

## 2018-10-22 DIAGNOSIS — R97.20 ELEVATED PSA: ICD-10-CM

## 2018-10-22 DIAGNOSIS — H90.3 SENSORINEURAL HEARING LOSS, BILATERAL: Primary | ICD-10-CM

## 2018-10-22 LAB
BILIRUB SERPL-MCNC: NORMAL MG/DL
BLOOD URINE, POC: NORMAL
COLOR, POC UA: YELLOW
GLUCOSE UR QL STRIP: NORMAL
KETONES UR QL STRIP: NORMAL
LEUKOCYTE ESTERASE URINE, POC: NORMAL
NITRITE, POC UA: NORMAL
PH, POC UA: 8
PROTEIN, POC: NORMAL
SPECIFIC GRAVITY, POC UA: 1.01
UROBILINOGEN, POC UA: NORMAL

## 2018-10-22 PROCEDURE — 1100F PTFALLS ASSESS-DOCD GE2>/YR: CPT | Mod: CPTII,S$GLB,, | Performed by: NURSE PRACTITIONER

## 2018-10-22 PROCEDURE — 69210 REMOVE IMPACTED EAR WAX UNI: CPT | Mod: S$GLB,,, | Performed by: OTOLARYNGOLOGY

## 2018-10-22 PROCEDURE — 3288F FALL RISK ASSESSMENT DOCD: CPT | Mod: CPTII,S$GLB,, | Performed by: NURSE PRACTITIONER

## 2018-10-22 PROCEDURE — 1101F PT FALLS ASSESS-DOCD LE1/YR: CPT | Mod: CPTII,S$GLB,, | Performed by: OTOLARYNGOLOGY

## 2018-10-22 PROCEDURE — 20610 DRAIN/INJ JOINT/BURSA W/O US: CPT | Mod: LT,S$GLB,, | Performed by: NURSE PRACTITIONER

## 2018-10-22 PROCEDURE — 99999 PR PBB SHADOW E&M-EST. PATIENT-LVL III: CPT | Mod: PBBFAC,,, | Performed by: OTOLARYNGOLOGY

## 2018-10-22 PROCEDURE — 99999 PR PBB SHADOW E&M-EST. PATIENT-LVL III: CPT | Mod: PBBFAC,,, | Performed by: NURSE PRACTITIONER

## 2018-10-22 PROCEDURE — 99999 PR PBB SHADOW E&M-EST. PATIENT-LVL I: CPT | Mod: PBBFAC,,,

## 2018-10-22 PROCEDURE — 99499 UNLISTED E&M SERVICE: CPT | Mod: S$GLB,,, | Performed by: NURSE PRACTITIONER

## 2018-10-22 PROCEDURE — 99214 OFFICE O/P EST MOD 30 MIN: CPT | Mod: 25,S$GLB,, | Performed by: OTOLARYNGOLOGY

## 2018-10-22 PROCEDURE — 99214 OFFICE O/P EST MOD 30 MIN: CPT | Mod: 25,S$GLB,, | Performed by: NURSE PRACTITIONER

## 2018-10-22 PROCEDURE — 92557 COMPREHENSIVE HEARING TEST: CPT | Mod: S$GLB,,, | Performed by: PHYSICIAN ASSISTANT

## 2018-10-22 PROCEDURE — 81002 URINALYSIS NONAUTO W/O SCOPE: CPT | Mod: S$GLB,,, | Performed by: NURSE PRACTITIONER

## 2018-10-22 PROCEDURE — 99999 PR PBB SHADOW E&M-EST. PATIENT-LVL IV: CPT | Mod: PBBFAC,,, | Performed by: NURSE PRACTITIONER

## 2018-10-22 RX ORDER — TRIAMCINOLONE ACETONIDE 40 MG/ML
40 INJECTION, SUSPENSION INTRA-ARTICULAR; INTRAMUSCULAR
Status: COMPLETED | OUTPATIENT
Start: 2018-10-22 | End: 2018-10-22

## 2018-10-22 RX ADMIN — TRIAMCINOLONE ACETONIDE 40 MG: 40 INJECTION, SUSPENSION INTRA-ARTICULAR; INTRAMUSCULAR at 11:10

## 2018-10-22 NOTE — PATIENT INSTRUCTIONS
PSA                  5.6 (H)             10/15/2018                 PSA                  5.3 (H)             01/25/2018            PSA                  7.2 (H)             02/08/2017                 PSA                  5.9 (H)             01/23/2017                 PSA                  6.1 (H)             01/16/2017             PSA                  4.3*                04/28/2015                 PSA                  4.6*                02/25/2015                 PSA                      4.5*                02/21/2014                 PSA                      4.42*               05/06/2013                 PSA                      5.47*               10/04/2012                 PSA                      5.3*                01/03/2011                 PSA                      4.2*                09/27/2010                 PSA                      5.2*                08/23/2010                 PSA                      3.4                 07/21/2010                 PSA                      3.9                 05/10/2010                 PSA                      3.4                 04/22/2009                 PSA                      2.1                 10/16/2007                            BPH (Enlarged Prostate)  The prostate is a gland at the base of the bladder. As some men get older, the prostate may get bigger in size. This problem is called benign prostatic hyperplasia (BPH). BPH puts pressure on the urethra. This is the tube that carries urine from the bladder to the penis. It may interfere with the flow of urine. It may also keep the bladder from emptying fully.    Symptoms of BPH include trouble starting urination and feeling as though the bladder isnt emptying all the way. It also includes a weak urine stream, dribbling and leaking of urine, and frequent and urgent urination (especially at night). BPH can increase the risk of urinary infections. It can also block off urine flow completely. If this  occurs, a thin tube (catheter) may be passed into the bladder to help drain urine.  If symptoms are mild, no treatment may be needed right now. If symptoms are more severe, treatment is likely needed. The goal of treatment is to improve urine flow and reduce symptoms. Treatments can include medicine and procedures. Your healthcare provider will discuss treatment options with you as needed.  Home care  The following guidelines will help you care for yourself at home:  · Urinate as soon as you feel the urge. Don't try to hold your urine.  · Don't limit your fluid intake during the day. Drink 6 to 8 glasses of water or liquids a day. This prevents bacteria from building up in the bladder.  · Avoid drinking fluids after dinner to help reduce urination during the night.  · Avoid medicines that can worsen your symptoms. These include certain cold and allergy medicines and antidepressants. Diuretics used for high blood pressure can also worsen symptoms. Talk to your doctor about the medicines you take. Other choices may work better for you.  Prostate cancer screening  BPH does not increase the risk of prostate cancer. But because prostate cancer is a common cancer in men, screening is sometimes recommended. This may help detect the cancer in its early stages when treatment is most effective. Factors that can increase the risk of prostate cancer include being -American or having a father or brother who had prostate cancer. A high-fat diet may also increase the risk of prostate cancer. Talk to your healthcare provider to see whether you should be screened for prostate cancer.  Follow-up care  Follow up with your healthcare provider, or as advised  To learn more, go to:  · National Kidney & Urologic Diseases Information Clearinghouse  kidney.niddk.nih.gov, 774.280.1085  When to seek medical advice  Call your healthcare provider right away if any of these occur:  · Fever of 100.4°F (38.0°C) or higher, or as  advised  · Unable to pass urine for 8 hours  · Increasing pressure or pain in your bladder (lower abdomen)  · Blood in the urine  · Increasing low back pain, not related to injury  · Symptoms of urinary infection (increased urge to urinate, burning when passing urine, foul-smelling urine)  Date Last Reviewed: 7/1/2016  © 6543-3219 [employer]. [address] [place], [place] [address]. All rights reserved. This information is not intended as a substitute for professional medical care. Always follow your healthcare professional's instructions.

## 2018-10-22 NOTE — PROGRESS NOTES
Pt with known left knee djd. He is here today for new braces for his foot drop and he requests to be seen for a cortisone injection as well. His last injection was in September 2017.    Knee Injection Procedure Note    Diagnosis: left knee degenerative arthritis  Indications: left knee pain  Procedure Details: Verbal consent was obtained for the procedure. The injection site was identified and the skin was prepared with alcohol. The left knee was injected from an anterolateral approach with 1 ml of Kenalog and 4 ml Lidocaine under sterile technique using a 22 gauge needle. The needle was removed and the area cleansed and dressed.  Complications:  Patient tolerated the procedure well.    he was advised to rest the knee today, using ice and elevation as needed for comfort and swelling.Immediate relief of the knee pain may be short lived and secondary to the lidocaine. he may have an increase in discomfort tonight followed by steady improvement over the next several days. It may take 1-2 weeks following the injection to get the full benefit of the medication.

## 2018-10-22 NOTE — PROGRESS NOTES
Subjective:       Patient ID: Raffy Rutherford Jr. is a 68 y.o. male.    Chief Complaint: Elevated PSA    Raffy Rutherford Jr. is a 67 y.o. Male with history of BPH, prostatitis and elevated PSA  Unable to tolerate alpha blockers.  (-) Trus/bx 1/17/2011 with Dr. El. PSA was 5.3 at the time.    Last clinic visit 01/25/2018 02/22/2017 MRI of the Prostate when the PSA was 7.2  -Peripheral Zone: PI RADS grade 2  -Transitional Zone: PI RADS grade 2  -Prostate size 36 ml      He today for annual exam;  Voices no complaints.  Ok with how he urinates.  Minimal nocturia.    Mild ED in the evening; takes Viagra when he needs it.                    PSA                  5.6 (H)             10/15/2018                 PSA                  5.3 (H)             01/25/2018            PSA                  7.2 (H)             02/08/2017                 PSA                  5.9 (H)             01/23/2017                 PSA                  6.1 (H)             01/16/2017             PSA                  4.3*                04/28/2015                 PSA                  4.6*                02/25/2015                 PSA                      4.5*                02/21/2014                 PSA                      4.42*               05/06/2013                 PSA                      5.47*               10/04/2012                 PSA                      5.3*                01/03/2011                 PSA                      4.2*                09/27/2010                 PSA                      5.2*                08/23/2010                 PSA                      3.4                 07/21/2010                 PSA                      3.9                 05/10/2010                 PSA                      3.4                 04/22/2009                 PSA                      2.1                 10/16/2007                                Past Medical History:    Depression                                                     Hyperlipidemia                                                Chronic pain                                                    Comment:neck and left shoulder    Colon polyp                                                   Adenomatous polyp                               2003          History of prostate biopsy                      2002          GERD (gastroesophageal reflux disease)                        Back pain                                                       Comment:after trauma beginning in 89    Sleep apnea                                                   Allergy                                                       Arthritis                                                     Joint pain                                                    Hepatitis                                       1970's          Comment:A    Visual disturbance                              2012            Comment:problems after cataract surgery    Degenerative disc disease                                     Fibromyalgia                                    2013          Cluster headache                                2013          Diverticulitis                                  12/2013       Thyroid nodule                                  7/16/2014     Special screening for malignant neoplasms, col* 5/5/2014      Adenomatous polyp                                             Past Surgical History:    SINUS SURGERY                                                  KNEE SURGERY                                                     Comment:involving arthroscopic surgery to both knees    HEMORRHOID SURGERY                                               Comment:with complication of chronic bleeding for 6                weeks     CHOLECYSTECTOMY                                                SINUS SURGERY                                                    Comment:left molar and sinus surgery for trigeminal                neuralgia    EYE  SURGERY                                                    BACK SURGERY                                                   JOINT REPLACEMENT                                              TOTAL HIP ARTHROPLASTY                           4/2012          Comment:Pt states he had total hip replacement on his                left hip.    COSMETIC SURGERY                                 2/10/2015       Comment:Direct mid-forehead brow lift    COSMETIC SURGERY                                 2/10/2015       Comment:Bilateral upper lid blepahroplasty    SPINE SURGERY                                    6/22/15         Comment:XLIF/TANA    CATARACT EXTRACTION W/  INTRAOCULAR LENS IMPLA* Bilateral               SPINAL FUSION                                    6/22/2015       Comment:L3-L5 XLIF    Review of patient's family history indicates:    Cancer                         Mother                      Comment: colon; uterine    Nephrolithiasis                Mother                    Stroke                         Mother                    Pancreatitis                   Brother                   Vision loss                    Brother                     Comment: macular degeneration    Diabetes                       Brother                   Macular degeneration           Brother                   Melanoma                       Neg Hx                    Amblyopia                      Neg Hx                    Blindness                      Neg Hx                    Cataracts                      Neg Hx                    Glaucoma                       Neg Hx                    Hypertension                   Neg Hx                    Retinal detachment             Neg Hx                    Strabismus                     Neg Hx                    Thyroid disease                Neg Hx                    Migraines                      Sister                    No Known Problems              Father                    No Known  Problems              Maternal Grandmother      No Known Problems              Maternal Grandfather      No Known Problems              Paternal Grandmother      No Known Problems              Paternal Grandfather      No Known Problems              Sister                    No Known Problems              Maternal Aunt             No Known Problems              Maternal Uncle            No Known Problems              Paternal Aunt             No Known Problems              Paternal Uncle              Social History    Marital Status:              Spouse Name:                       Years of Education:                 Number of children:               Occupational History  Occupation          Employer            Comment               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*                         Social History Main Topics    Smoking Status: Never Smoker                      Smokeless Status: Never Used                        Alcohol Use: Yes                Comment: occasion    Drug Use: No              Sexual Activity: Yes               Partners with: Female    Other Topics            Concern    None on file    Social History Narrative    None on file        Allergies:  Alphagan; Coumadin; and Oxycodone    Medications:  Current outpatient prescriptions: alendronate (FOSAMAX) 70 MG tablet, Take 1 tablet (70 mg total) by mouth every 7 days., Disp: 12 tablet, Rfl: 3;  clonazePAM (KLONOPIN) 1 MG tablet, TAKE 2 TABLETS AT BEDTIME. (Patient taking differently: TAKE 2 TABLETS AT BEDTIME. patient states taking 1.5), Disp: 180 tablet, Rfl: 1;  desoximetasone 0.05 % Gel, Apply to affected area 2 times a day as needed, Disp: , Rfl: 1  ergocalciferol (VITAMIN D2) 50,000 unit Cap, TAKE 1 CAPSULE (50,000 UNITS TOTAL) BY MOUTH TWICE A WEEK., Disp: 24 capsule, Rfl: 3;  fish oil-omega-3 fatty acids 300-1,000 mg capsule, Take 2 g by mouth once daily., Disp: , Rfl: ;  (START ON 12/22/2015) hydrocodone-acetaminophen 10-325mg (NORCO)  mg Tab,  Take 1 tablet by mouth 3 (three) times daily as needed., Disp: 90 tablet, Rfl: 0  hydrocortisone-pramoxine (ANALPRAM-HC) 2.5-1 % Crea, 2.5 mg., Disp: , Rfl: ;  ketoconazole (NIZORAL) 2 % cream, Apply to affected area 2 times a day for 2 weeks, Disp: , Rfl: 1;  lamotrigine (LAMICTAL) 200 MG tablet, One and one/half daily (Patient taking differently: Take 150 mg by mouth once daily. One and one/half daily), Disp: 135 tablet, Rfl: 3  magnesium oxide (MAG-OX) 400 mg tablet, Take 1 tablet (400 mg total) by mouth 2 (two) times daily., Disp: 60 tablet, Rfl: 12;  meclizine (ANTIVERT) 25 mg tablet, Take 1 tablet (25 mg total) by mouth every 6 (six) hours. Prn vertigo (Patient taking differently: Take 25 mg by mouth every 6 (six) hours as needed. Prn vertigo), Disp: 25 tablet, Rfl: 1;  methylPREDNISolone (MEDROL DOSEPACK) 4 mg tablet, use as directed, Disp: 1 Package, Rfl: 0  pilocarpine HCL 1% (PILOCAR) 1 % ophthalmic solution, Place 1 drop into both eyes once as needed. , Disp: , Rfl: ;  pravastatin (PRAVACHOL) 40 MG tablet, TAKE 1 TABLET (40 MG TOTAL) BY MOUTH EVERY EVENING., Disp: 30 tablet, Rfl: 5;  rabeprazole (ACIPHEX) 20 mg tablet, Take 2 tablets (40 mg total) by mouth once daily., Disp: 180 tablet, Rfl: 3;  selegiline (EMSAM) 12 mg/24 hr, Place 1 patch onto the skin once daily., Disp: 90 patch, Rfl: 3  senna-docusate 8.6-50 mg (PERICOLACE) 8.6-50 mg per tablet, Take 2 tablets by mouth nightly as needed for Constipation., Disp: , Rfl: ;  trazodone (DESYREL) 100 MG tablet, 1 and 1/2 talbet daily (Patient taking differently: every evening. 1 and 1/2 talbet daily), Disp: 135 tablet, Rfl: 3;  UBIDECARENONE (CO Q-10 ORAL), Take 1 capsule by mouth once daily. , Disp: , Rfl:   UNABLE TO FIND, medication name: cefaly + electrodes, wear 20-30 mins, Dx: migraine, Disp: 1 Units, Rfl: 0  Current facility-administered medications: gentamicin injection 160 mg, 160 mg, Intramuscular, Q12H, Usha Gonzales NP, 160 mg at 12/17/15  1504                Review of Systems   Constitutional: Negative for activity change, appetite change, chills and fever.   HENT: Negative for facial swelling and trouble swallowing.    Eyes: Negative for visual disturbance.   Respiratory: Negative for chest tightness and shortness of breath.    Cardiovascular: Negative for chest pain and palpitations.   Gastrointestinal: Negative.  Negative for abdominal pain, constipation, diarrhea, nausea and vomiting.   Genitourinary: Positive for nocturia. Negative for difficulty urinating, dysuria, flank pain, hematuria, penile pain, penile swelling, scrotal swelling and testicular pain.        He is pleased with how he urinates.     Musculoskeletal: Positive for arthralgias. Negative for back pain, gait problem, myalgias and neck stiffness.   Skin: Negative for rash.   Neurological: Negative for dizziness and speech difficulty.        Has some neuropathy   Hematological: Does not bruise/bleed easily.   Psychiatric/Behavioral: Negative for behavioral problems.       Objective:      Physical Exam   Nursing note and vitals reviewed.  Constitutional: He is oriented to person, place, and time. Vital signs are normal. He appears well-developed and well-nourished. He is active and cooperative.  Non-toxic appearance. He does not have a sickly appearance.   Urine dipped clear of infection in the office today.   HENT:   Head: Normocephalic and atraumatic.   Right Ear: External ear normal.   Left Ear: External ear normal.   Nose: Nose normal.   Mouth/Throat: Mucous membranes are normal.   Eyes: Conjunctivae and lids are normal. No scleral icterus.   Neck: Trachea normal, normal range of motion and full passive range of motion without pain. Neck supple. No JVD present. No tracheal deviation present.   Cardiovascular: Normal rate, S1 normal and S2 normal.    Pulmonary/Chest: Effort normal. No respiratory distress. He exhibits no tenderness.   Abdominal: Soft. Normal appearance and bowel  sounds are normal. There is no hepatosplenomegaly. There is no tenderness. There is no CVA tenderness.   Genitourinary: Testes normal and penis normal. Rectal exam shows no external hemorrhoid, no mass and no tenderness. Prostate is enlarged. Prostate is not tender. Right testis shows no mass, no swelling and no tenderness. Left testis shows no mass, no swelling and no tenderness. No hypospadias, penile erythema or penile tenderness. No discharge found.       Musculoskeletal: Normal range of motion.   Lymphadenopathy: No inguinal adenopathy noted on the right or left side.   Neurological: He is alert and oriented to person, place, and time. He has normal strength.   Skin: Skin is warm, dry and intact.     Psychiatric: He has a normal mood and affect. His behavior is normal. Judgment and thought content normal.       Assessment:       1. BPH with urinary obstruction    2. Elevated PSA        Plan:         I spent 25 minutes with the patient of which more than half was spent in direct consultation with the patient in regards to our treatment and plan.    Education and recommendations of today's plan of care including home remedies.  We discussed his LUTS and contributory factors.  Discussed his PSA results. psa elevated but stable. Same PSA as when had (-) bx in 2011.  Diet modifications  Monitor ED  RTC 6 months with new PSA.

## 2018-10-22 NOTE — PROGRESS NOTES
Mr. Rutherford was seen today for a hearing evaluation. Mr. Rutherford reported significant tinnitus that is worse in the right ear.     Pure tone audiometry revealed a mild to moderate (beginning at 3k Hz.) SNHL, AU  SRT was obtained at 0 dB for the right ear with 100% speech discrimination and 0 dB for the left ear with 100% speech discrimination.   Tympanometry: Could not maintain a seal, AU    Recommendations:  1. Otologic Evaluation  2. Annual Audiogram  3. Hearing Protection  4. Hearing Aid Consult

## 2018-10-22 NOTE — LETTER
October 22, 2018      Nini Rendon MD  [address]  [facility] [address]           [facility] - Urology 4th Floor  [address]  [facility] [address]  Phone: [phone]          Patient: Raffy Rutherford Jr.   MR Number: [id number]   YOB: 1950   Date of Visit: 10/22/2018       Dear Dr. Nini Rendon:    Thank you for referring Raffy Rutherford to me for evaluation. Attached you will find relevant portions of my assessment and plan of care.    If you have questions, please do not hesitate to call me. I look forward to following Raffy Rutherford along with you.    Sincerely,    Usha Gonzales, NP    Enclosure  CC:  No Recipients    If you would like to receive this communication electronically, please contact [email or web address] or [phone] to request more information on [facility] [employer] Link access.    For providers and/or their staff who would like to refer a patient to [facility], please contact us through our one-stop-shop provider referral line, [facility], at [phone].    If you feel you have received this communication in error or would no longer like to receive these types of communications, please e-mail [email or web address]

## 2018-10-22 NOTE — LETTER
October 22, 2018      Nini Rendon MD  [address]  [facility] [address]           [facility] Otorhinol[facility]ology  [address]  [facility] [address]  Phone: [phone]  Fax: [phone]          Patient: Raffy Rutherford Jr.   MR Number: [id number]   YOB: 1950   Date of Visit: 10/22/2018       Dear Dr. Nini Rendon:    Thank you for referring Raffy Rutherford to me for evaluation. Attached you will find relevant portions of my assessment and plan of care.    If you have questions, please do not hesitate to call me. I look forward to following Raffy Rutherford along with you.    Sincerely,    Ryan Rainey MD    Enclosure  CC:  No Recipients    If you would like to receive this communication electronically, please contact [email or web address] or [phone] to request more information on [employer] Link access.    For providers and/or their staff who would like to refer a patient to [facility], please contact us through our one-stop-shop provider referral line, [facility], at [phone].    If you feel you have received this communication in error or would no longer like to receive these types of communications, please e-mail [email or web address]

## 2018-10-22 NOTE — PROGRESS NOTES
Subjective:       Patient ID: Raffy Rutherford Jr. is a 68 y.o. male.    Chief Complaint: Tinnitus (Right Ear)    HPI: Hx of B Tinn R>l.    Present for yrs.    Also with oc otal/ pressure.    Past Medical History: Patient has a past medical history of Adenomatous polyp (2003), Adenomatous polyp, Allergy, Arthritis, Back pain, Cataract, Chronic pain, Cluster headache (2013), Colon polyp, Degenerative disc disease, Depression, Diverticulitis (12/2013), Elevated PSA, Fibromyalgia (2013), GERD (gastroesophageal reflux disease), Hepatitis (1970's), History of prostate biopsy (2002), Hyperlipidemia, Joint pain, Sleep apnea, Special screening for malignant neoplasms, colon (5/5/2014), Thyroid nodule (7/16/2014), and Visual disturbance (2012).    Past Surgical History: Patient has a past surgical history that includes Sinus surgery; Knee surgery; Hemorrhoid surgery; Cholecystectomy; Sinus surgery; Eye surgery; Back surgery; Joint replacement; Total hip arthroplasty (4/2012); Cosmetic surgery (2/10/2015); Cosmetic surgery (2/10/2015); Spine surgery (6/22/15); Cataract extraction w/  intraocular lens implant (Bilateral); Spinal fusion (6/22/2015); FUSION EXTREME LATERAL (N/A, 6/22/2015); ESOPHAGOGASTRODUODENOSCOPY (EGD) (N/A, 4/28/2015); LIFT-BROW midforehead direct (Bilateral, 2/10/2015); BLEPHAROPLASTY UPPER LID (Bilateral, 2/10/2015); COLONOSCOPY (N/A, 5/5/2014); and BLOCK, GANGLION IMPAR (N/A, 6/25/2013).    Social History: Patient reports that  has never smoked. he has never used smokeless tobacco. He reports that he drinks alcohol. He reports that he does not use drugs.    Family History: family history includes Cancer in his mother; Diabetes in his brother; Macular degeneration in his brother; Migraines in his sister; Nephrolithiasis in his mother; No Known Problems in his father, maternal aunt, maternal grandfather, maternal grandmother, maternal uncle, paternal aunt, paternal grandfather, paternal grandmother, paternal  uncle, and sister; Pancreatitis in his brother; Stroke (age of onset: 86) in his mother; Vision loss in his brother.    Medications:   Current Outpatient Medications   Medication Sig    butalbital-acetaminophen-caffeine -40 mg (FIORICET, ESGIC) -40 mg per tablet Take 1 tablet by mouth daily as needed for Pain. headache    butalbital-aspirin-caffeine -40 mg (FIORINAL) -40 mg Cap Take 1 capsule by mouth daily as needed. headache    clonazePAM (KLONOPIN) 0.5 MG tablet Take 1 tablet (0.5 mg total) by mouth 2 (two) times daily as needed for Anxiety.    fish oil-omega-3 fatty acids 300-1,000 mg capsule Take 2 g by mouth once daily.    HYDROcodone-acetaminophen (NORCO) 5-325 mg per tablet Take 1 tablet by mouth every 6 (six) hours. As needed    lamoTRIgine (LAMICTAL) 200 MG tablet Take one tablet by mouth once daily    pravastatin (PRAVACHOL) 40 MG tablet Take 1 tablet (40 mg total) by mouth once daily.    RABEprazole (ACIPHEX) 20 mg tablet Take 1 tablet (20 mg total) by mouth 2 (two) times daily.    selegiline (EMSAM) 12 mg/24 hr Place 1 patch onto the skin once daily.    senna-docusate 8.6-50 mg (PERICOLACE) 8.6-50 mg per tablet Take 2 tablets by mouth nightly as needed for Constipation.    sucralfate (CARAFATE) 1 gram tablet as needed.     timolol maleate 0.5% (TIMOPTIC-XE) 0.5 % SolG Place 1 drop into the left eye once daily.    traZODone (DESYREL) 100 MG tablet Take one tablet by mouth every night at bedtime    VOLTAREN 1 % Gel      No current facility-administered medications for this visit.        Allergies: Patient is allergic to alphagan [brimonidine]; coumadin [warfarin]; and oxycodone.      Review of Systems   Constitutional: Negative for activity change, appetite change, chills, diaphoresis, fatigue, fever and unexpected weight change.   HENT: Positive for ear pain and tinnitus. Negative for congestion, ear discharge, facial swelling, hearing loss, nosebleeds, postnasal  drip, rhinorrhea, sinus pressure, sneezing, sore throat, trouble swallowing and voice change.    Eyes: Negative for photophobia, pain, discharge, redness and visual disturbance.   Respiratory: Negative for cough, chest tightness, shortness of breath and wheezing.    Cardiovascular: Negative for chest pain and palpitations.   Gastrointestinal: Negative for abdominal pain, constipation, diarrhea and nausea.   Genitourinary: Negative for dysuria and frequency.   Musculoskeletal: Negative for arthralgias, back pain, gait problem, joint swelling, myalgias, neck pain and neck stiffness.   Skin: Negative for color change, pallor and rash.   Neurological: Negative for dizziness, tremors, seizures, syncope, facial asymmetry, speech difficulty, weakness, light-headedness, numbness and headaches.   Hematological: Negative for adenopathy. Does not bruise/bleed easily.   Psychiatric/Behavioral: Negative for agitation, confusion, decreased concentration, dysphoric mood and sleep disturbance. The patient is not nervous/anxious and is not hyperactive.        Objective:      Physical Exam   Constitutional: He appears well-developed and well-nourished. No distress.   HENT:   Head: Normocephalic and atraumatic.   Right Ear: No lacerations. No drainage, swelling or tenderness. No foreign bodies. No mastoid tenderness. Tympanic membrane is not injected, not scarred, not perforated, not erythematous, not retracted and not bulging. Tympanic membrane mobility is normal. No middle ear effusion. No hemotympanum. No decreased hearing is noted.   Left Ear: No lacerations. No drainage, swelling or tenderness. No foreign bodies. No mastoid tenderness. Tympanic membrane is not injected, not scarred, not perforated, not erythematous, not retracted and not bulging. Tympanic membrane mobility is normal.  No middle ear effusion. No hemotympanum. No decreased hearing is noted.   Mouth/Throat: Oropharynx is clear and moist. No oropharyngeal exudate.        B CI R>L.    Procedure Note:    Patient was brought to the minor procedure room and using the operating microscope the right ear canal  was cleaned of ceruminous debris. There was a significant cerumen impaction.  The forceps and suction were both used to perform this. Tympanic membrane intact. Pt tolerated well. There were no complications.    Procedure Note:    The patient was brought to the minor procedure room and placed under the operating microscope. Using a combination of suction, curettes and cup forceps the patient's cerumen impaction was removed. The tympanic membrane was evaluated and was unremarkable. The patient tolerated the procedure well. There were no complications.           Eyes: Pupils are equal, round, and reactive to light. Right eye exhibits normal extraocular motion and no nystagmus. Left eye exhibits normal extraocular motion and no nystagmus.   Neck: Normal range of motion.   Neurological: He is alert. He has normal strength. He displays no atrophy and no tremor. No cranial nerve deficit or sensory deficit. He exhibits normal muscle tone. He displays a negative Romberg sign. He displays no seizure activity. Coordination and gait normal.   Skin: He is not diaphoretic.               Assessment:       1. Bilateral impacted cerumen    2. Subjective tinnitus of right ear        Plan:         Discussed with patient multiple tinnitus management strategies including the use of maskers, instruments, background sound enrichment and other means. All questions answered and appropriate references given.    Patient to see Audiology for hearing aid evaluation.

## 2018-10-29 ENCOUNTER — CLINICAL SUPPORT (OUTPATIENT)
Dept: AUDIOLOGY | Facility: CLINIC | Age: 68
End: 2018-10-29

## 2018-10-29 DIAGNOSIS — H90.3 SENSORINEURAL HEARING LOSS, BILATERAL: Primary | ICD-10-CM

## 2018-10-29 PROCEDURE — 99499 UNLISTED E&M SERVICE: CPT | Mod: S$GLB,,, | Performed by: AUDIOLOGIST

## 2018-10-29 NOTE — PROGRESS NOTES
Mr. Rutherford was seen for a hearing aid consultation. Recommended binaural amplification. Discussed pros and cons of various styles and technology levels and the pros to binaural amplification. Patient was most interested in a right hearing aid for tinnitus relief. Patient elected to proceed with a right Widex Evoke 220 F2 in titanium grey. Patient acknowledged understanding of the 30 day trial period, $250 restocking fee from the time of order, and the fact that we do not file insurance on behalf of the patient. Patient was advised to call or email with any questions. Will need to call patient set up appointment when hearing aid comes in.

## 2018-11-02 ENCOUNTER — PATIENT MESSAGE (OUTPATIENT)
Dept: INTERNAL MEDICINE | Facility: CLINIC | Age: 68
End: 2018-11-02

## 2018-11-02 DIAGNOSIS — Z74.09 IMPAIRED FUNCTIONAL MOBILITY, BALANCE, GAIT, AND ENDURANCE: ICD-10-CM

## 2018-11-02 DIAGNOSIS — R53.1 WEAKNESS: ICD-10-CM

## 2018-11-02 DIAGNOSIS — R26.2 DIFFICULTY WALKING: ICD-10-CM

## 2018-11-02 DIAGNOSIS — R26.9 GAIT ABNORMALITY: ICD-10-CM

## 2018-11-02 DIAGNOSIS — M54.16 LUMBAR RADICULAR PAIN: ICD-10-CM

## 2018-11-02 DIAGNOSIS — M51.26 DISPLACEMENT OF LUMBAR INTERVERTEBRAL DISC WITHOUT MYELOPATHY: Primary | ICD-10-CM

## 2018-11-05 ENCOUNTER — HOSPITAL ENCOUNTER (OUTPATIENT)
Dept: RADIOLOGY | Facility: HOSPITAL | Age: 68
Discharge: HOME | End: 2018-11-05
Attending: INTERNAL MEDICINE
Payer: COMMERCIAL

## 2018-11-05 DIAGNOSIS — M51.26 DISPLACEMENT OF LUMBAR INTERVERTEBRAL DISC WITHOUT MYELOPATHY: ICD-10-CM

## 2018-11-05 DIAGNOSIS — R26.2 DIFFICULTY WALKING: ICD-10-CM

## 2018-11-05 DIAGNOSIS — Z74.09 IMPAIRED FUNCTIONAL MOBILITY, BALANCE, GAIT, AND ENDURANCE: ICD-10-CM

## 2018-11-05 DIAGNOSIS — R53.1 WEAKNESS: ICD-10-CM

## 2018-11-05 DIAGNOSIS — R26.9 GAIT ABNORMALITY: ICD-10-CM

## 2018-11-05 DIAGNOSIS — M54.16 LUMBAR RADICULAR PAIN: ICD-10-CM

## 2018-11-05 PROCEDURE — 72148 MRI LUMBAR SPINE W/O DYE: CPT | Mod: 26,,, | Performed by: RADIOLOGY

## 2018-11-05 PROCEDURE — 72148 MRI LUMBAR SPINE W/O DYE: CPT | Mod: TC

## 2018-11-13 ENCOUNTER — OFFICE VISIT (OUTPATIENT)
Dept: NEUROSURGERY | Facility: CLINIC | Age: 68
End: 2018-11-13
Payer: COMMERCIAL

## 2018-11-13 VITALS — BODY MASS INDEX: 26.97 KG/M2 | WEIGHT: 182.63 LBS

## 2018-11-13 DIAGNOSIS — M53.3 SACROILIAC JOINT DYSFUNCTION OF RIGHT SIDE: Primary | ICD-10-CM

## 2018-11-13 DIAGNOSIS — Z98.1 S/P LUMBAR FUSION: ICD-10-CM

## 2018-11-13 DIAGNOSIS — Z96.641 S/P HIP REPLACEMENT, RIGHT: ICD-10-CM

## 2018-11-13 DIAGNOSIS — G62.9 POLYNEUROPATHY: ICD-10-CM

## 2018-11-13 PROCEDURE — 99999 PR PBB SHADOW E&M-EST. PATIENT-LVL III: CPT | Mod: PBBFAC,,, | Performed by: NEUROLOGICAL SURGERY

## 2018-11-13 PROCEDURE — 1101F PT FALLS ASSESS-DOCD LE1/YR: CPT | Mod: CPTII,S$GLB,, | Performed by: NEUROLOGICAL SURGERY

## 2018-11-13 PROCEDURE — 99214 OFFICE O/P EST MOD 30 MIN: CPT | Mod: S$GLB,,, | Performed by: NEUROLOGICAL SURGERY

## 2018-11-13 RX ORDER — HYDROCODONE BITARTRATE AND ACETAMINOPHEN 5; 325 MG/1; MG/1
1 TABLET ORAL EVERY 6 HOURS PRN
Qty: 60 TABLET | Refills: 0 | Status: SHIPPED | OUTPATIENT
Start: 2018-11-13 | End: 2018-12-18 | Stop reason: SDUPTHER

## 2018-11-13 NOTE — PROGRESS NOTES
NEUROSURGICAL OUTPATIENT CONSULTATION NOTE    DATE OF SERVICE:  11/13/2018    ATTENDING PHYSICIAN:  Jason Caldwell MD    CONSULT REQUESTED BY:  Nini Rendon MD    REASON FOR CONSULT:  Legs weakness, right hip, low back pain.     SUBJECTIVE:    HISTORY OF PRESENT ILLNESS:  This is a very pleasant 68 y.o. male who is s/p L3-4 and L4-5 lateral fusion by Dr Browne in 06/2015. 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 He is followed in neurology by Dr [facility]. He complains of dorsiflexion and plantar flexion weakness bilaterally. The weakness has been stable for the last 6 months. He uses bilateral AFO braces. Complains of right-sided low back pain irradiating in the groin and buttock. Complains of pain in the ight L5 distribution that is burning. Foot pain is associated with numbness.     Low Back Pain Scale  R Low Back-Pain Score: 8  R Low Back-Pain Intensity: Pain killers give moderate relief from pain  R Low Back-Pain Score: I need some help, but manage most of my personal care  Low Back-Lifting: Pain prevents me from lifting heavy weights  but I can manage light weights if they are conveniently placed   Low Back-Walking: Pain prevents me walking more than 1 mile   Low Back-Sitting: Pain prevents me from sitting more than 30 minutes   Low Back-Standing: I cannot stand for longer than 30 mins without increasing pain   Low Back-Sleeping: I have pain in bed but it does not prevent me from sleeping well   Low Back-Social Life: Pain has no significant effect on my social life apart from limiting my more en   Low Back-Traveling: I have extra pain while traveling but it does not compel me to seek alternate forms of travel   Low Back-Changing Degree of Pain: my pain is rapidly worsening         PAST MEDICAL HISTORY:  Active Ambulatory Problems     Diagnosis Date Noted    Depression     Hyperlipidemia     Chronic pain     Colon polyp     Adenomatous polyp      History of prostate biopsy     GERD (gastroesophageal reflux disease)     Back pain     Sleep apnea     Visual disturbances 10/03/2012    Spondylosis without myelopathy 11/09/2012    Degeneration of lumbar or lumbosacral intervertebral disc 11/09/2012    Spinal stenosis, lumbar region, without neurogenic claudication 11/09/2012    Thoracic or lumbosacral neuritis or radiculitis, unspecified 11/09/2012    Displacement of lumbar intervertebral disc without myelopathy 11/09/2012    Acquired spondylolisthesis 11/09/2012    Lumbago 11/09/2012    Status post cataract extraction and insertion of intraocular lens - Both Eyes 11/13/2012    Prostatitis, acute 12/17/2012    Fibromyalgia 10/21/2013    OP (osteoporosis) 10/21/2013    Compression fracture of T12 vertebra 10/21/2013    Diverticulitis large intestine 12/21/2013    Osteoarthritis 03/19/2014    Lower back pain 04/01/2014    Lower extremity pain 04/01/2014    Decreased strength 04/01/2014    Range of motion deficit 04/01/2014    Special screening for malignant neoplasms, colon 05/05/2014    Cervicalgia 06/19/2014    Facet joint disease of cervical region 06/19/2014    Occipital neuralgia 06/19/2014    Chronic migraine without aura, with intractable migraine, so stated, with status migrainosus 06/19/2014    Cervical radiculopathy 06/19/2014    Paroxysmal hemicrania 06/19/2014    Sciatic nerve pain 06/19/2014    Chronic LBP 06/27/2014    DJD (degenerative joint disease) of lumbar spine 06/27/2014    Chronic neck pain 06/27/2014    Right lumbar radiculopathy 06/27/2014    Thyroid nodule 07/16/2014    Carpal tunnel syndrome of right wrist 07/16/2014    Paresthesia 08/01/2014    Brow ptosis 02/10/2015    Degenerative disc disease 06/22/2015    Encounter for postoperative wound check 08/01/2015    Lumbar radiculopathy 09/15/2015    Cervical spondylosis 10/22/2015    Low back pain without sciatica 10/27/2015    Lumbar radicular  pain 10/27/2015    S/P lumbar spinal fusion 12/02/2015    Gait abnormality 02/21/2017    Impaired functional mobility, balance, gait, and endurance 02/24/2017    Left hip pain 05/24/2017    Right wrist tendinitis 07/28/2017    Pain in right wrist 07/28/2017    Difficulty walking 11/01/2017    Weakness 11/01/2017    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    Headache around the eyes 06/26/2018     Resolved Ambulatory Problems     Diagnosis Date Noted    Blepharitis of both eyes - Both Eyes 11/13/2012     Past Medical History:   Diagnosis Date    Adenomatous polyp 2003    Adenomatous polyp     Allergy     Arthritis     Back pain     Cataract     Chronic pain     Cluster headache 2013    Colon polyp     Degenerative disc disease     Depression     Diverticulitis 12/2013    Elevated PSA     Fibromyalgia 2013    GERD (gastroesophageal reflux disease)     Hepatitis 1970's    History of prostate biopsy 2002    Hyperlipidemia     Joint pain     Sleep apnea     Special screening for malignant neoplasms, colon 5/5/2014    Thyroid nodule 7/16/2014    Visual disturbance 2012       PAST SURGICAL HISTORY:  Past Surgical History:   Procedure Laterality Date    BACK SURGERY      BLEPHAROPLASTY UPPER LID Bilateral 2/10/2015    Performed by Rhett Lainez III, [facility] at [facility] OR 2ND FLR    BLOCK, GANGLION IMPAR N/A 6/25/2013    Performed by Araceli Evans MD at [facility] PAIN MGT    CATARACT EXTRACTION W/  INTRAOCULAR LENS IMPLANT Bilateral     CHOLECYSTECTOMY      COLONOSCOPY N/A 5/5/2014    Performed by Pedro Carlos MD at [facility] ENDO (4TH FLR)    COSMETIC SURGERY  2/10/2015    Direct mid-forehead brow lift    COSMETIC SURGERY  2/10/2015    Bilateral upper lid blepahroplasty    ESOPHAGOGASTRODUODENOSCOPY (EGD) N/A 4/28/2015    Performed by Amadou Hardin MD at [facility] ENDO (4TH FLR)    EYE SURGERY      FUSION EXTREME LATERAL N/A 6/22/2015    Performed by Milad  MD Pavan at [facility] OR 2ND FLR    HEMORRHOID SURGERY      with complication of chronic bleeding for 6 weeks     JOINT REPLACEMENT      KNEE SURGERY      involving arthroscopic surgery to both knees    LIFT-BROW midforehead direct Bilateral 2/10/2015    Performed by Rhett Lainez III, MD at [facility] OR 2ND FLR    SINUS SURGERY      SINUS SURGERY      left molar and sinus surgery for trigeminal neuralgia    SPINAL FUSION  6/22/2015    L3-L5 XLIF    SPINE SURGERY  6/22/15    XLIF/PAVAN    TOTAL HIP ARTHROPLASTY  4/2012    Pt states he had total hip replacement on his left hip.       SOCIAL HISTORY:   Social History     Socioeconomic History    Marital status:      Spouse name: Not on file    Number of children: Not on file    Years of education: Not on file    Highest education level: Not on file   Social Needs    Financial resource strain: Not on file    Food insecurity - worry: Not on file    Food insecurity - inability: Not on file    Transportation needs - medical: Not on file    Transportation needs - non-medical: Not on file   Occupational History    Occupation: Psychotherpist    Tobacco Use    Smoking status: Never Smoker    Smokeless tobacco: Never Used   Substance and Sexual Activity    Alcohol use: Yes     Comment: occasion    Drug use: No    Sexual activity: Yes     Partners: Female   Other Topics Concern    Not on file   Social History Narrative    Not on file       FAMILY HISTORY:  Family History   Problem Relation Age of Onset    Cancer Mother         colon; uterine    Nephrolithiasis Mother     Stroke Mother 86    Pancreatitis Brother     Vision loss Brother         macular degeneration    Diabetes Brother     Macular degeneration Brother     Migraines Sister     No Known Problems Father     No Known Problems Maternal Grandmother     No Known Problems Maternal Grandfather     No Known Problems Paternal Grandmother     No Known Problems Paternal  Grandfather     No Known Problems Sister     No Known Problems Maternal Aunt     No Known Problems Maternal Uncle     No Known Problems Paternal Aunt     No Known Problems Paternal Uncle     Melanoma Neg Hx     Amblyopia Neg Hx     Blindness Neg Hx     Cataracts Neg Hx     Glaucoma Neg Hx     Hypertension Neg Hx     Retinal detachment Neg Hx     Strabismus Neg Hx     Thyroid disease Neg Hx     Allergic rhinitis Neg Hx     Allergies Neg Hx     Angioedema Neg Hx     Asthma Neg Hx     Eczema Neg Hx     Urticaria Neg Hx     Rhinitis Neg Hx     Immunodeficiency Neg Hx     Atopy Neg Hx        CURRENTS MEDICATIONS:  Current Outpatient Medications on File Prior to Visit   Medication Sig Dispense Refill    butalbital-acetaminophen-caffeine -40 mg (FIORICET, ESGIC) -40 mg per tablet Take 1 tablet by mouth daily as needed for Pain. headache 30 tablet 3    butalbital-aspirin-caffeine -40 mg (FIORINAL) -40 mg Cap Take 1 capsule by mouth daily as needed. headache 30 capsule 3    clonazePAM (KLONOPIN) 0.5 MG tablet Take 1 tablet (0.5 mg total) by mouth 2 (two) times daily as needed for Anxiety. 60 tablet 2    fish oil-omega-3 fatty acids 300-1,000 mg capsule Take 2 g by mouth once daily.      HYDROcodone-acetaminophen (NORCO) 5-325 mg per tablet Take 1 tablet by mouth every 6 (six) hours. As needed  0    lamoTRIgine (LAMICTAL) 200 MG tablet Take one tablet by mouth once daily 90 tablet 3    pravastatin (PRAVACHOL) 40 MG tablet Take 1 tablet (40 mg total) by mouth once daily. 90 tablet 3    RABEprazole (ACIPHEX) 20 mg tablet Take 1 tablet (20 mg total) by mouth 2 (two) times daily. 180 tablet 3    selegiline (EMSAM) 12 mg/24 hr Place 1 patch onto the skin once daily. 90 patch 3    senna-docusate 8.6-50 mg (PERICOLACE) 8.6-50 mg per tablet Take 2 tablets by mouth nightly as needed for Constipation.      sucralfate (CARAFATE) 1 gram tablet as needed.       timolol maleate  0.5% (TIMOPTIC-XE) 0.5 % SolG Place 1 drop into the left eye once daily. 5 mL 4    traZODone (DESYREL) 100 MG tablet Take one tablet by mouth every night at bedtime 90 tablet 3    VOLTAREN 1 % Gel        No current facility-administered medications on file prior to visit.        ALLERGIES:  Review of patient's allergies indicates:   Allergen Reactions    Alphagan [brimonidine]      Patient taking MASO-B Selective Inhibitor Selegiline (Emsam)    Coumadin [warfarin]      itch    Oxycodone      hiccups       REVIEW OF SYSTEMS:  Review of Systems   Constitutional: Negative for diaphoresis, fever and weight loss.   Respiratory: Negative for shortness of breath.    Cardiovascular: Negative for chest pain.   Gastrointestinal: Negative for blood in stool.   Genitourinary: Negative for hematuria.   Endo/Heme/Allergies: Does not bruise/bleed easily.   All other systems reviewed and are negative.      OBJECTIVE:    PHYSICAL EXAMINATION:   There were no vitals filed for this visit.    Physical Exam:  Vitals reviewed.    Constitutional: He appears well-developed and well-nourished.     Eyes: Pupils are equal, round, and reactive to light. Conjunctivae and EOM are normal.     Cardiovascular: Normal distal pulses and no edema.     Abdominal: Soft.     Skin: Skin displays no rash on trunk and no rash on extremities. Skin displays no lesions on trunk and no lesions on extremities.     Psych/Behavior: He is alert. He is oriented to person, place, and time. He has a normal mood and affect.     Musculoskeletal:        Neck: Range of motion is full.     Neurological:        DTRs: Tricep reflexes are 2+ on the right side and 2+ on the left side. Bicep reflexes are 2+ on the right side and 2+ on the left side. Brachioradialis reflexes are 2+ on the right side and 2+ on the left side. Patellar reflexes are 2+ on the right side and 2+ on the left side. Achilles reflexes are 0 on the right side and 0 on the left side.       Back Exam      Tenderness   The patient is experiencing tenderness in the sacroiliac (right).    Range of Motion   Extension: normal   Flexion: abnormal   Lateral bend right: normal   Lateral bend left: normal   Rotation right: normal   Rotation left: normal     Muscle Strength   Right Quadriceps:  5/5   Left Quadriceps:  5/5   Right Hamstrings:  5/5   Left Hamstrings:  5/5     Tests   Straight leg raise right: negative  Straight leg raise left: negative    Other   Toe walk: abnormal  Heel walk: abnormal            SI joint:   Palpation at the right SI joint is painful  ABDIEL test is positive on the right side  Gaenslen test is positive on the right side  Thigh thrust test is positive on the right side    Neurologic Exam     Mental Status   Oriented to person, place, and time.   Speech: speech is normal   Level of consciousness: alert    Cranial Nerves   Cranial nerves II through XII intact.     CN III, IV, VI   Pupils are equal, round, and reactive to light.  Extraocular motions are normal.     Motor Exam   Muscle bulk: decreased (bilateral gastroc and TA)  Overall muscle tone: normal    Strength   Right deltoid: 5/5  Left deltoid: 5/5  Right biceps: 5/5  Left biceps: 5/5  Right triceps: 5/5  Left triceps: 5/5  Right wrist flexion: 5/5  Left wrist flexion: 5/5  Right wrist extension: 5/5  Left wrist extension: 5/5  Right interossei: 5/5  Left interossei: 5/5  Right iliopsoas: 5/5  Left iliopsoas: 5/5  Right quadriceps: 5/5  Left quadriceps: 5/5  Right hamstrin/5  Left hamstrin/5  Right anterior tibial: 4/5  Left anterior tibial: 4/5  Right posterior tibial: 4/5  Left posterior tibial: 4/5  Right peroneal: 4/5  Left peroneal: 4/5  Right gastroc: 4/5  Left gastroc: 4/5    Sensory Exam   Right leg light touch: decreased from knee (L5 distrib)  Left leg light touch: decreased from knee    Gait, Coordination, and Reflexes     Gait  Gait: normal    Coordination   Finger to nose coordination: normal  Tandem walking  coordination: normal    Reflexes   Right brachioradialis: 2+  Left brachioradialis: 2+  Right biceps: 2+  Left biceps: 2+  Right triceps: 2+  Left triceps: 2+  Right patellar: 2+  Left patellar: 2+  Right achilles: 0  Left achilles: 0  Right plantar: normal  Left plantar: normal  Right De Leon: absent  Left De Leon: absent  Right ankle clonus: absent  Left ankle clonus: absent        DIAGNOSTIC DATA:  I personally interpreted the following imaging:   Lumbar spine MRI 11/2018: L3 to L5 fusion, no stenosis or nerve root compression    ASSESMENT:  This is a 68 y.o. male with     Problem List Items Addressed This Visit     None      Visit Diagnoses     Sacroiliac joint dysfunction of right side    -  Primary    Relevant Orders    Ambulatory consult to Physical Therapy    S/P lumbar fusion        Relevant Orders    Ambulatory consult to Physical Therapy    S/P hip replacement, right        Relevant Orders    Ambulatory consult to Physical Therapy    Polyneuropathy        Relevant Orders    Ambulatory consult to Physical Therapy          PLAN:  Right SI joint PT 3 times a week for 6 weeks  Right SI joint block and steroid injection.  SI joint belt  Will get paper charts from [facility]    Compound cream for neuropathic pain  Refill Norco 5 (60 pills)          Jason Caldwell MD  Cell:[phone]

## 2018-11-13 NOTE — LETTER
November 13, 2018      Nini Rendon MD  [address]  [facility] [address]           [place] - Neurosurgery  [address]  [facility] [address]  Phone: [phone]          Patient: Raffy Rutherford Jr.   MR Number: [id number]   YOB: 1950   Date of Visit: 11/13/2018       Dear Dr. Nini Rendon:    Thank you for referring Raffy Rutherford to me for evaluation. Attached you will find relevant portions of my assessment and plan of care.    If you have questions, please do not hesitate to call me. I look forward to following Raffy Rutherford along with you.    Sincerely,    Jason Caldwell MD    Enclosure  CC:  No Recipients    If you would like to receive this communication electronically, please contact [email or web address] or [phone] to request more information on [employer] Link access.    For providers and/or their staff who would like to refer a patient to [facility], please contact us through our one-stop-shop provider referral line, [facility], at [phone].    If you feel you have received this communication in error or would no longer like to receive these types of communications, please e-mail [email or web address]

## 2018-11-14 ENCOUNTER — TELEPHONE (OUTPATIENT)
Dept: NEUROSURGERY | Facility: CLINIC | Age: 68
End: 2018-11-14

## 2018-11-14 DIAGNOSIS — G62.9 POLYNEUROPATHY: ICD-10-CM

## 2018-11-14 DIAGNOSIS — M53.3 SI (SACROILIAC) JOINT DYSFUNCTION: Primary | ICD-10-CM

## 2018-11-14 DIAGNOSIS — Z96.641 STATUS POST RIGHT HIP REPLACEMENT: ICD-10-CM

## 2018-11-14 DIAGNOSIS — Z98.1 S/P LUMBAR SPINAL ARTHRODESIS: ICD-10-CM

## 2018-11-15 ENCOUNTER — CLINICAL SUPPORT (OUTPATIENT)
Dept: REHABILITATION | Facility: HOSPITAL | Age: 68
End: 2018-11-15
Attending: NEUROLOGICAL SURGERY
Payer: COMMERCIAL

## 2018-11-15 DIAGNOSIS — M54.16 LUMBAR BACK PAIN WITH RADICULOPATHY AFFECTING LOWER EXTREMITY: ICD-10-CM

## 2018-11-15 PROCEDURE — 97110 THERAPEUTIC EXERCISES: CPT | Mod: PO

## 2018-11-15 PROCEDURE — 97162 PT EVAL MOD COMPLEX 30 MIN: CPT | Mod: PO

## 2018-11-15 PROCEDURE — G8979 MOBILITY GOAL STATUS: HCPCS | Mod: CK,PO

## 2018-11-15 PROCEDURE — G8978 MOBILITY CURRENT STATUS: HCPCS | Mod: CK,PO

## 2018-11-15 NOTE — PLAN OF CARE
[facility]  Physical Therapy Initial Evaluation    Name: Raffy Rutherford Jr.  Clinic Number: [id number]    Therapy Diagnosis: No diagnosis found.  Physician: Jason Caldwell MD    Physician Orders: PT Eval and Treat Low back pain w/ bilat radiculopathy  Medical Diagnosis from Referral: SI joint dysfunction, s/p lumbar fusion, s/p R SHARAD, polyneuropathy  Evaluation Date: 11/15/2018  Authorization Period Expiration: 12/31/18  Plan of Care Expiration: 2/15/19  Visit # / Visits authorized: 1/ 12    Time In: 2:00  Time Out: 3:05  Total Billable Time: 65 minutes    Precautions: Standard    Subjective   Date of onset: May 2017  History of current condition - Raffy reports: he was being treated for low back pain w/ bilat radicular sx during Nov 2017 however d/t R inguinal hernia exacerbation he discontinued both PT and working out with his  (2x/week). Pt reports starting in May 2017 his bilat foot drop/ LE weakness has gotten worse (previously did not require AFO's to walk but now does. Pt reports he has bilateral LE radicular sx R>L. Pt reports his pain is the worst while driving, experiences pain mostly down R LE within the first few minutes. Pt reports he utilizes a padded seat equipped with an ice pack at the tailbone region which makes driving/ sitting more tolerable. 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 Pt reports he feels that walking makes his pain better, and states that recently he was on vacation in [place] and was able to walk 3 miles a day without increase in any sx. Pt would like to increase LE strength, tolerate extended periods of sitting (for work/ driving) and develop a resistance routine to maintain LE strength. Pt has steroid injection scheduled 12/6/18 for his R SI joint.     Past Medical History:   Diagnosis  Date    Adenomatous polyp 2003    Adenomatous polyp     Allergy     Arthritis     Back pain     after trauma beginning in 195    Cataract     Chronic pain     neck and left shoulder    Cluster headache 2013    Colon polyp     Degenerative disc disease     Depression     Diverticulitis 12/2013    Elevated PSA     Fibromyalgia 2013    GERD (gastroesophageal reflux disease)     Hepatitis 1970's    A    History of prostate biopsy 2002    Hyperlipidemia     Joint pain     Sleep apnea     Special screening for malignant neoplasms, colon 5/5/2014    Thyroid nodule 7/16/2014    Visual disturbance 2012    problems after cataract surgery     Raffy Rutherford Jr.  has a past surgical history that includes Sinus surgery; Knee surgery; Hemorrhoid surgery; Cholecystectomy; Sinus surgery; Eye surgery; Back surgery; Joint replacement; Total hip arthroplasty (4/2012); Cosmetic surgery (2/10/2015); Cosmetic surgery (2/10/2015); Spine surgery (6/22/15); Cataract extraction w/  intraocular lens implant (Bilateral); Spinal fusion (6/22/2015); FUSION EXTREME LATERAL (N/A, 6/22/2015); ESOPHAGOGASTRODUODENOSCOPY (EGD) (N/A, 4/28/2015); LIFT-BROW midforehead direct (Bilateral, 2/10/2015); BLEPHAROPLASTY UPPER LID (Bilateral, 2/10/2015); COLONOSCOPY (N/A, 5/5/2014); and BLOCK, GANGLION IMPAR (N/A, 6/25/2013).    Raffy has a current medication list which includes the following prescription(s): butalbital-acetaminophen-caffeine -40 mg, butalbital-aspirin-caffeine -40 mg, clonazepam, fish oil-omega-3 fatty acids, hydrocodone-acetaminophen, lamotrigine, pravastatin, rabeprazole, selegiline, senna-docusate 8.6-50 mg, sucralfate, timolol maleate 0.5%, trazodone, and voltaren.    Review of patient's allergies indicates:   Allergen Reactions    Alphagan [brimonidine]      Patient taking MASO-B Selective Inhibitor Selegiline (Emsam)    Coumadin [warfarin]      itch    Oxycodone      hiccups      Imaging, MRI  studies: 11/6/18:   FINDINGS:  Alignment: Normal.  Vertebrae: Postoperative changes of T12 vertebroplasty and posterior spinal fusion from L3 through L5 with bipedicular screws and intervertebral disc spacers.  Normal marrow signal. No fracture.  Discs: Disc heights are maintained.  Mild multilevel disc desiccation.  Cord: Normal.  Conus terminates at L1.  Degenerative findings:  T12-L1: No focal disc protrusion, significant spinal canal stenosis or neural foraminal narrowing.  L1-L2: Mild bilateral facet arthropathy without significant spinal canal stenosis or neural foraminal narrowing.  L2-L3: Mild circumferential disc bulge, bilateral facet arthropathy and buckling of the ligamentum flavum without significant spinal canal stenosis or neural foraminal narrowing.  L3-L4: No significant spinal canal stenosis or neural foraminal narrowing.  L4-L5: No significant spinal canal stenosis or neural foraminal narrowing.  L5-S1: Circumferential disc bulge asymmetric to the left, bilateral facet arthropathy resulting in mild left neural foraminal narrowing.  Paraspinal muscles & soft tissues: Unremarkable.      Impression     Stable postoperative changes of T12 vertebroplasty and posterior instrumented fusion from L3 through L5.  Multilevel lumbar spondylosis as detailed above, without significant spinal canal stenosis or neural foraminal narrowing.     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, has 12 steps to enter office  Prior Level of Function: Pt independent with all ADLs, playing tennis and jogging regularly  Current Level of Function: Pt independent with ADLs, avoiding stairs when possible and working out with personal  "/ walking for recreational exercise    Pain:  Current 7/10, worst 8/10, best 5/10   Location: bilateral back , lower legs and upper legs  Description: Aching, Dull, Throbbing, Tight, Tingling, Deep, Numb, Sharp, Electric, Shooting and Variable  Aggravating Factors: Sitting, Standing, Bending, Night Time, Morning, Extension, Flexing, Lifting and Getting out of bed/chair  Easing Factors: pain medication, ice, injection and rest    Pts goals: Pt would like to increase LE strength, tolerate extended periods of sitting (for work/ driving) and develop a resistance routine to maintain LE strength.    Objective     Dermatomes:   Right Left Comment   L2 intact intact    L3 intact intact    L4 intact intact    L5 intact intact    S1 intact intact    S2 intact intact    Saddle intact intact      Myotomes:   Right Left Comment   Hip Flexion: 4+/5 4+/5  a little pain on L   Knee Extension: 4-/5 4-/5  reports "kofi horse" bilat w/ testing   Knee Flexion: 4+/5 4+/5    Ankle DF: 3+/5 3+/5    Great toe extension: 3+/5 3+/5    Great toe flexion: 3+/5 3+/5    Hip abduction  4/5         3+/5    Special test    Sacral spring (-)  Piriformis (+) on R  Slump (+) bilat  Distraction (+)    PA glides restricted but pain free throughout thoracic spine    Gait: pt demonstrates shortened stride length and increased knee flexion throughout gait cycle, good foot clearance bilat  Pt demonstrates extension preference/ extension bias      CMS Impairment/Limitation/Restriction for FOTO Lumbar Survey    Therapist reviewed FOTO scores for Raffy Rutherford Jr. on 11/15/2018.   FOTO documents entered into [employer] - see Media section.    Limitation Score: 53%  Category: Mobility    Current : CK = at least 40% but < 60% impaired, limited or restricted  Goal: CK = at least 40% but < 60% impaired, limited or restricted           TREATMENT   Treatment Time In: 2:45  Treatment Time Out: 3:05  Total Treatment time separate from Evaluation: 20 " minutes    Raffy received therapeutic exercises to develop strength, endurance, ROM, flexibility, posture and core stabilization for 15 minutes including:    Pelvic tilt 2x20  Transverse abdominus march 2x10  Piriformis 2x30 sec  Prone extension on elbows 3-5 min (pt instructed to perform to tolerance and d/c w/ any exacerbation of sx    Raffy received the following manual therapy techniques: Myofacial release were applied to the: R hip for 5 minutes, including:    Active/ passive release to R piriformis    Home Exercises and Patient Education Provided    Education provided re: posture, activity modification, car seat ergonomics, sx management, POC, roll of PT    Written Home Exercises Provided: All exercises performed during today's treatment were printed and given to pt  .  Exercises were reviewed and Raffy was able to demonstrate them prior to the end of the session.   Pt received a written copy of exercises to perform at home. Raffy demonstrated good  understanding of the education provided.     Assessment   Raffy is a 68 y.o. male referred to outpatient Physical Therapy with a medical diagnosis of chronic LBP with bilat radiculopathy. Pt presents with decreased strength, decreased ROM, decreased flexibility, faulty posture, abnormal gait, increased neural tension, myotome weakness, and increased pain. Due to impairments, pt is unable to tolerate extended periods of static positioning for work/playing gigs/driving, bending over to  light objects or recreational exercise.     Pt prognosis is Good.   Pt will benefit from skilled outpatient Physical Therapy to address the deficits stated above and in the chart below, provide pt/family education, and to maximize pt's level of independence.     Plan of care discussed with patient: Yes  Pt's spiritual, cultural and educational needs considered and patient is agreeable to the plan of care and goals as stated below:     Anticipated Barriers for therapy:  chronicity of symptoms    Medical Necessity is demonstrated by the following  History  Co-morbidities and personal factors that may impact the plan of care Co-morbidities:   depression, prior hip surgery and prior lumbar surgery    Personal Factors:   no deficits     moderate   Examination  Body Structures and Functions, activity limitations and participation restrictions that may impact the plan of care Body Regions:   back  lower extremities  trunk    Body Systems:    ROM  strength  balance  gait  transfers  motor control    Participation Restrictions:   Playing stand up bass    Activity limitations:   Learning and applying knowledge  no deficits    General Tasks and Commands  no deficits    Communication  no deficits    Mobility  lifting and carrying objects  using transportation (bus, train, plane, car)  driving (bike, car, motorcycle)    Self care  washing oneself (bathing, drying, washing hands)  dressing    Domestic Life  shopping  cooking  doing house work (cleaning house, washing dishes, laundry)    Interactions/Relationships  no deficits    Life Areas  no deficits    Community and Social Life  no deficits         moderate   Clinical Presentation stable and uncomplicated moderate   Decision Making/ Complexity Score: moderate     Goals:  Short Term Goals: 3 weeks   1. Pt will be able to tolerate 20 minutes of driving reporting no more than 10% increase in back pain/ radicular sx  2. Pt will be able to ascend/descend 12 steps with one railing reporting no increase in pain in order to be able to navigate to work  3. Pt will be independent with HEP and report compliance at least 5 days/week    Long Term Goals: 12 weeks   1. Pt will demonstrate bilat LE MMT of at least 4/5 throughout to demonstrate improved functional mobility  2. 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 Pt will be independent with gym based strengthening routine  and demonstrate good understanding of what exercises/ positions to avoid in order to maintain strength gains and prevent sx exacerbation2    Plan   Plan of care Certification: 11/15/2018 to 2/15/19.    Outpatient Physical Therapy 2 times weekly for 12 weeks to include the following interventions: Dry Needling, Aquatic Therapy, Cervical/Lumbar Traction, Electrical Stimulation TENS, Gait Training, Manual Therapy, Moist Heat/ Ice, Neuromuscular Re-ed, Orthotic Management and Training, Patient Education, Self Care, Therapeutic Activites, Therapeutic Exercise and Ultrasound.     Roldan García, PT

## 2018-11-19 ENCOUNTER — PATIENT MESSAGE (OUTPATIENT)
Dept: SURGERY | Facility: HOSPITAL | Age: 68
End: 2018-11-19

## 2018-11-20 ENCOUNTER — CLINICAL SUPPORT (OUTPATIENT)
Dept: AUDIOLOGY | Facility: CLINIC | Age: 68
End: 2018-11-20

## 2018-11-20 DIAGNOSIS — H90.3 SENSORINEURAL HEARING LOSS, BILATERAL: Primary | ICD-10-CM

## 2018-11-20 NOTE — PROGRESS NOTES
Mr. Rutherford was seen on today's date for a hearing aid delivery. He was fitted with right Widex Evoke 220 Fusion hearing aid. He was set to level one acclimitization. We discussed acclimatization. We practiced insertion/removal of hearing aid, insertion/removal of batteries, water precautions, adjusting programs and emil volume with onboard button, and cleaning (changing wax traps, cleaning microphones, using low lent cloth). His hearing aids were paired with his iPhone. Triple clicking and use of the Zazzy Evoke emanuel were practiced with the patient, including answering telephone. We made it so the hearing aid does not default to streaming through the hearing aid. The patient was given written instructions on how to change this. He set a f/u appointment for December 3. He was encouraged to e-mail if he has any issues prior to his appointment.      Hearing Aid Information:    Right ear  : Widex  Model:  Evoke 220   Type:  MARYELLEN  Color: Titanium Grey  Battery: 312  Tube/ length & power: S3  Dome size & style:  Small open  Serial number: [id number]   Warranty expiration:  11-29-21   L and D expiration:  11-29-21

## 2018-11-21 ENCOUNTER — CLINICAL SUPPORT (OUTPATIENT)
Dept: REHABILITATION | Facility: HOSPITAL | Age: 68
End: 2018-11-21
Attending: NEUROLOGICAL SURGERY
Payer: COMMERCIAL

## 2018-11-21 DIAGNOSIS — R53.1 WEAKNESS: ICD-10-CM

## 2018-11-21 DIAGNOSIS — R26.2 DIFFICULTY WALKING: ICD-10-CM

## 2018-11-21 PROCEDURE — 97113 AQUATIC THERAPY/EXERCISES: CPT

## 2018-11-21 NOTE — PROGRESS NOTES
[facility]  Physical Therapy Aquatic Treatment     Name: Raffy Rutherford Jr.  Clinic Number: [id number]     Therapy Diagnosis: No diagnosis found.  Physician: Jason Caldwell MD     Physician Orders: PT Eval and Treat Low back pain w/ bilat radiculopathy  Medical Diagnosis from Referral: SI joint dysfunction, s/p lumbar fusion, s/p R SHARAD, polyneuropathy  Evaluation Date: 11/15/2018  Authorization Period Expiration: 12/31/18  Plan of Care Expiration: 2/15/19  Visit # / Visits authorized: 2/ 12     Time In: 1420  Time Out: 1525  Total Treatment Time: 65 minutes  Total Billable Time: 45 minutes     Precautions: Standard       Subjective  Pt reports:LBP and RLE radiating pain.    Pt pain level: 6/10    Objective    Treatment: Pt was instructed in and performed therapeutic exercises to develop hip stabilization/flexibility, BLE strengthening/flexibility, ankle stabilization and gait tx for 65 minutes. Patient performed therapeutic exercises consisting of the following exercises:    Warm-up Laps 3 x each  fwd/bkw/lat     Stretches: 2 x 30 sec  HS  Piriformis RLE only     LE exs:   Heel Raise with GS 30x  Mini Squats with QS 30x  Step ups 30x  Hip abd/add 30x  Hip hikes 30x  Stationary lunges 20x  Heel walks in // bars2 laps   Toe walks in // bars 2 laps  Static standing in tandem stance 2x15 sec with each LE in the posterior position  fwd/bkw weight shifting 5 min  fwd ambulation with verbal cues for proper push off and heel strike 4 laps    Issued HEP: 11/21/18 piriformis stretch, PPT with TA bracing, bridging, s/l clamshells, gastroc stretch, ankle 4-way with resistance band, marble  with toes, toe intrinsic strengthening flx/ext/abd/add ([id number]).      Assessment  Pt's tolerated initial aquatic tx well w/ no increase in symptoms. Tx focused on BLE flexibility/strengthening, hip stabilization, ankle stabilization and gait tx. Pt demonstrated poor ankle stability and could benefit from  continued ankle stabilization activities during subsequent tx visits. Will progress as tolerated. Patient will continue to benefit from skilled PT intervention.    Raffy Rutherford Jr. is making good progress towards established goals.    Anticipated barriers to physical therapy: None    Goals:  Short Term Goals: 3 weeks   1. Pt will be able to tolerate 20 minutes of driving reporting no more than 10% increase in back pain/ radicular sx  2. Pt will be able to ascend/descend 12 steps with one railing reporting no increase in pain in order to be able to navigate to work  3. Pt will be independent with HEP and report compliance at least 5 days/week     Long Term Goals: 12 weeks   1. Pt will demonstrate bilat LE MMT of at least 4/5 throughout to demonstrate improved functional mobility  2. 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 Pt will be independent with gym based strengthening routine and demonstrate good understanding of what exercises/ positions to avoid in order to maintain strength gains and prevent sx exacerbation2       Plan  Continue POC established by PT.

## 2018-11-28 ENCOUNTER — CLINICAL SUPPORT (OUTPATIENT)
Dept: REHABILITATION | Facility: HOSPITAL | Age: 68
End: 2018-11-28
Attending: NEUROLOGICAL SURGERY
Payer: COMMERCIAL

## 2018-11-28 ENCOUNTER — ANESTHESIA EVENT (OUTPATIENT)
Dept: SURGERY | Facility: HOSPITAL | Age: 68
End: 2018-11-28
Payer: COMMERCIAL

## 2018-11-28 DIAGNOSIS — M54.16 LUMBAR BACK PAIN WITH RADICULOPATHY AFFECTING LOWER EXTREMITY: ICD-10-CM

## 2018-11-28 PROCEDURE — 97113 AQUATIC THERAPY/EXERCISES: CPT

## 2018-11-28 NOTE — PROGRESS NOTES
[facility]  Physical Therapy Aquatic Treatment     Name: Raffy Rutherford Jr.  Clinic Number: [id number]     Therapy Diagnosis: No diagnosis found.  Physician: Jason Caldwell MD     Physician Orders: PT Eval and Treat Low back pain w/ bilat radiculopathy  Medical Diagnosis from Referral: SI joint dysfunction, s/p lumbar fusion, s/p R SHARAD, polyneuropathy  Evaluation Date: 11/15/2018  Authorization Period Expiration: 12/31/18  Plan of Care Expiration: 2/15/19  Visit # / Visits authorized: 3/ 12     Time In: 1430   Time Out: 1530  Total Treatment Time: 60 minutes  Total Billable Time: 30 minutes     Precautions: Standard       Subjective  Pt reports:no extra pain post last tx session just some minor soreness. LBP and RLE radiating pain.    Pt pain level: 4/10    Objective    Treatment: Pt was instructed in and performed therapeutic exercises to develop hip stabilization/flexibility, BLE strengthening/flexibility, ankle stabilization and gait tx for 65 minutes. Patient performed therapeutic exercises consisting of the following exercises:    Warm-up Laps 3 x each  fwd/bkw/lat     Stretches: 2 x 30 sec  HS  Piriformis RLE only     LE exs:   Heel Raise with GS 30x  Mini Squats with QS 30x  Step ups 30x  Hip abd/add 30x  Hip hikes 30x  Abduction walks in mini squat stance and B internal hip rotation 2 laps  Stationary lunges 20x  Walking lunges with verbal cues for strong push off 2 laps   Heel walks in // bars 3 laps   Toe walks in // bars with UE support 3 laps  Static standing in tandem stance 2x20 sec with each LE in the posterior position  Tandem walks in // bars with occasional UE support 2 laps  fwd/bkw weight shifting 3 min  fwd ambulation with verbal cues for proper push off and heel strike 4 laps    Issued HEP: 11/21/18 piriformis stretch, PPT with TA bracing, bridging, s/l clamshells, gastroc stretch, ankle 4-way with resistance band, marble  with toes, toe intrinsic  strengthening flx/ext/abd/add ([id number]).      Assessment  Pt tolerated tx progression well w/ no increase in symptoms. Tx focused on BLE flexibility/strengthening, hip stabilization, ankle stabilization and gait tx. Pt demonstrated improved ankle stability when compared to the last tx visit but could benefit from continued ankle stabilization activities during subsequent tx visits. Will progress as tolerated. Patient will continue to benefit from skilled PT intervention.    Raffy Rutherford Jr. is making good progress towards established goals.    Anticipated barriers to physical therapy: None    Goals:  Short Term Goals: 3 weeks   1. Pt will be able to tolerate 20 minutes of driving reporting no more than 10% increase in back pain/ radicular sx  2. Pt will be able to ascend/descend 12 steps with one railing reporting no increase in pain in order to be able to navigate to work  3. Pt will be independent with HEP and report compliance at least 5 days/week     Long Term Goals: 12 weeks   1. Pt will demonstrate bilat LE MMT of at least 4/5 throughout to demonstrate improved functional mobility  2. 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 Pt will be independent with gym based strengthening routine and demonstrate good understanding of what exercises/ positions to avoid in order to maintain strength gains and prevent sx exacerbation2       Plan  Continue POC established by PT.

## 2018-12-03 ENCOUNTER — CLINICAL SUPPORT (OUTPATIENT)
Dept: REHABILITATION | Facility: HOSPITAL | Age: 68
End: 2018-12-03
Attending: NEUROLOGICAL SURGERY
Payer: COMMERCIAL

## 2018-12-03 DIAGNOSIS — M54.16 LUMBAR BACK PAIN WITH RADICULOPATHY AFFECTING LOWER EXTREMITY: ICD-10-CM

## 2018-12-03 PROCEDURE — 97113 AQUATIC THERAPY/EXERCISES: CPT

## 2018-12-03 NOTE — PROGRESS NOTES
[facility]  Physical Therapy Aquatic Treatment     Name: Raffy Rutherford Jr.  Clinic Number: [id number]     Therapy Diagnosis: No diagnosis found.  Physician: Jason Caldwell MD     Physician Orders: PT Eval and Treat Low back pain w/ bilat radiculopathy  Medical Diagnosis from Referral: SI joint dysfunction, s/p lumbar fusion, s/p R SHARAD, polyneuropathy  Evaluation Date: 11/15/2018  Authorization Period Expiration: 12/31/18  Plan of Care Expiration: 2/15/19  Visit # / Visits authorized: 4/ 12     Time In: 1430   Time Out: 1535  Total Treatment Time: 65 minutes  Total Billable Time: 65 minutes     Precautions: Standard       Subjective  Pt reports: that he felt some LBP and RLE radiating pain last week as he attended a festival in which he had to stand for an extended period of time.      Pt pain level: 4/10    Objective    Treatment: Pt was instructed in and performed therapeutic exercises to develop hip stabilization/flexibility, BLE strengthening/flexibility, ankle stabilization and gait tx for 65 minutes. Patient performed therapeutic exercises consisting of the following exercises:    Warm-up Laps 3 x each  fwd/bkw/lat     Stretches: 2 x 30 sec  HS  Piriformis RLE only     BLE/Hip stabilization:   Heel Raise with GS 30x  Mini Squats with QS 30x  Step ups 30x  Stationary lunges 20x  Hip abd/add 30x  Hip hikes 30x  Abduction walks in mini squat stance and B internal hip rotation 2 laps  Walking lunges with verbal cues for strong push off 2 laps     Ankle stabilization:  Heel walks in // bars 2 laps   Toe walks in // bars with UE support 2 laps  Static standing in tandem stance 2x30 sec with each LE in the posterior position  Tandem walks in // bars with occasional UE support 2 laps    Core:  Shoulder abd/add in modified tandem with apc 30x  Shoulder flx/ext in modified tandem with apc 30x  DKTC in // bars 1x15  Wall sits w/ aquatic 1 DB in long lever arm position 3x30 sec     Gait  tx:  fwd/bkw weight shifting 3 min  fwd ambulation with verbal cues for proper push off and heel strike 4 laps    Issued HEP: 11/21/18 piriformis stretch, PPT with TA bracing, bridging, s/l clamshells, gastroc stretch, ankle 4-way with resistance band, marble  with toes, toe intrinsic strengthening flx/ext/abd/add ([id number]).      Assessment  Pt tolerated core exercise progression well w/ no increase in symptoms. Tx focused on BLE flexibility/strengthening, core/hip stabilization, ankle stabilization and gait tx. Pt continued to demonstrate improved ankle stability when compared to previous tx visit but could benefit from continued ankle stabilization activities during subsequent tx visits. Will progress as tolerated. Patient will continue to benefit from skilled PT intervention.    Raffy Rutherford Jr. is making good progress towards established goals.    Anticipated barriers to physical therapy: None    Goals:  Short Term Goals: 3 weeks   1. Pt will be able to tolerate 20 minutes of driving reporting no more than 10% increase in back pain/ radicular sx  2. Pt will be able to ascend/descend 12 steps with one railing reporting no increase in pain in order to be able to navigate to work  3. Pt will be independent with HEP and report compliance at least 5 days/week     Long Term Goals: 12 weeks   1. Pt will demonstrate bilat LE MMT of at least 4/5 throughout to demonstrate improved functional mobility  2. 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 Pt will be independent with gym based strengthening routine and demonstrate good understanding of what exercises/ positions to avoid in order to maintain strength gains and prevent sx exacerbation2       Plan  Continue POC established by PT.

## 2018-12-04 ENCOUNTER — TELEPHONE (OUTPATIENT)
Dept: NEUROSURGERY | Facility: CLINIC | Age: 68
End: 2018-12-04

## 2018-12-04 ENCOUNTER — PATIENT MESSAGE (OUTPATIENT)
Dept: SURGERY | Facility: HOSPITAL | Age: 68
End: 2018-12-04

## 2018-12-04 NOTE — TELEPHONE ENCOUNTER
Spoke to the patient instructed the hospital will call him on day before surgery with a time of arrival. Will follow up on the cream and si belt.

## 2018-12-05 ENCOUNTER — OFFICE VISIT (OUTPATIENT)
Dept: OTOLARYNGOLOGY | Facility: CLINIC | Age: 68
End: 2018-12-05
Payer: COMMERCIAL

## 2018-12-05 ENCOUNTER — CLINICAL SUPPORT (OUTPATIENT)
Dept: REHABILITATION | Facility: HOSPITAL | Age: 68
End: 2018-12-05
Attending: NEUROLOGICAL SURGERY
Payer: COMMERCIAL

## 2018-12-05 ENCOUNTER — CLINICAL SUPPORT (OUTPATIENT)
Dept: AUDIOLOGY | Facility: CLINIC | Age: 68
End: 2018-12-05

## 2018-12-05 VITALS
BODY MASS INDEX: 25.7 KG/M2 | SYSTOLIC BLOOD PRESSURE: 134 MMHG | DIASTOLIC BLOOD PRESSURE: 79 MMHG | WEIGHT: 174 LBS | HEART RATE: 62 BPM | TEMPERATURE: 97 F

## 2018-12-05 DIAGNOSIS — H90.3 SENSORINEURAL HEARING LOSS, BILATERAL: Primary | ICD-10-CM

## 2018-12-05 DIAGNOSIS — H93.13 TINNITUS, BILATERAL: Primary | ICD-10-CM

## 2018-12-05 DIAGNOSIS — M54.16 LUMBAR BACK PAIN WITH RADICULOPATHY AFFECTING LOWER EXTREMITY: ICD-10-CM

## 2018-12-05 DIAGNOSIS — H90.3 HEARING LOSS, SENSORINEURAL, HIGH FREQUENCY, BILATERAL: ICD-10-CM

## 2018-12-05 PROCEDURE — 97113 AQUATIC THERAPY/EXERCISES: CPT

## 2018-12-05 PROCEDURE — 99999 PR PBB SHADOW E&M-EST. PATIENT-LVL IV: CPT | Mod: PBBFAC,,, | Performed by: OTOLARYNGOLOGY

## 2018-12-05 PROCEDURE — 3288F FALL RISK ASSESSMENT DOCD: CPT | Mod: CPTII,S$GLB,, | Performed by: OTOLARYNGOLOGY

## 2018-12-05 PROCEDURE — 99212 OFFICE O/P EST SF 10 MIN: CPT | Mod: S$GLB,,, | Performed by: OTOLARYNGOLOGY

## 2018-12-05 PROCEDURE — 1100F PTFALLS ASSESS-DOCD GE2>/YR: CPT | Mod: CPTII,S$GLB,, | Performed by: OTOLARYNGOLOGY

## 2018-12-05 NOTE — PROGRESS NOTES
CC:  HPI:Mr. Rutherford is a 68-year-old  gentleman who indicates the need for an ear cleaning procedure today.  He has  been trying out some new hearing aid technology with built-in masking for suppression of tinnitus perception.  The device is a right ear Widex Evoke 220 Fusion hearing aid.  He is  not sure that he is getting benefit from the device at this point.   He would like further more nuanced evaluation of the device re: hearing loss from the audiologists here if helpful.  He will be redirected to NAVEEN Valdez who  has been working with him most recently.  Is recently evaluated by my neuro otological colleague Dr. velasquez of the [facility] 10/22/2018 for right ear tinnitus perception greater than left ear. Cerumen impactions were extracted from each ear canal at that visit.    Audiometry was performed which indicated the patient's 3 through 8 K bilateral right ear > left sensorineural hearing loss.  SRT scores were excellent measuring 0 decibels for each ear with 88 and 96% discrimination scores indicated for the right left ears respectively tested at 40 decibel hearing levels.  He is wearing leg braces.  He is a musician and composer.       Past Medical History:   Diagnosis Date    Adenomatous polyp 2003    Adenomatous polyp     Allergy     Arthritis     Back pain     after trauma beginning in 195    Cataract     Chronic pain     neck and left shoulder    Cluster headache 2013    Colon polyp     Degenerative disc disease     Depression     Diverticulitis 12/2013    Elevated PSA     Fibromyalgia 2013    GERD (gastroesophageal reflux disease)     Hepatitis 1970's    A    History of prostate biopsy 2002    Hyperlipidemia     Joint pain     Sleep apnea     Special screening for malignant neoplasms, colon 5/5/2014    Thyroid nodule 7/16/2014    Visual disturbance 2012    problems after cataract surgery    Allergies:  Alphagan, Coumadin, oxycodone  Current Outpatient Medications on File Prior to  Visit   Medication Sig Dispense Refill    butalbital-aspirin-caffeine -40 mg (FIORINAL) -40 mg Cap Take 1 capsule by mouth daily as needed. headache 30 capsule 3    clonazePAM (KLONOPIN) 0.5 MG tablet Take 1 tablet (0.5 mg total) by mouth 2 (two) times daily as needed for Anxiety. 60 tablet 2    HYDROcodone-acetaminophen (NORCO) 5-325 mg per tablet Take 1 tablet by mouth every 6 (six) hours as needed for Pain. As needed 60 tablet 0    lamoTRIgine (LAMICTAL) 200 MG tablet Take one tablet by mouth once daily 90 tablet 3    pravastatin (PRAVACHOL) 40 MG tablet Take 1 tablet (40 mg total) by mouth once daily. 90 tablet 3    selegiline (EMSAM) 12 mg/24 hr Place 1 patch onto the skin once daily. 90 patch 3    senna-docusate 8.6-50 mg (PERICOLACE) 8.6-50 mg per tablet Take 2 tablets by mouth nightly as needed for Constipation.      sucralfate (CARAFATE) 1 gram tablet as needed.       traZODone (DESYREL) 100 MG tablet Take one tablet by mouth every night at bedtime 90 tablet 3    VOLTAREN 1 % Gel       fish oil-omega-3 fatty acids 300-1,000 mg capsule Take 2 g by mouth once daily.      RABEprazole (ACIPHEX) 20 mg tablet Take 1 tablet (20 mg total) by mouth 2 (two) times daily. 180 tablet 3    timolol maleate 0.5% (TIMOPTIC-XE) 0.5 % SolG Place 1 drop into the left eye once daily. 5 mL 4     No current facility-administered medications on file prior to visit.      His medical problem list is extensive and includes depression, chronic neck and shoulder pain, GERD, back pain status post trauma, sleep apnea, spinal stenosis of the lumbar region, fibromyalgia, compression fracture of T12, diverticulitis of large intestine, cervicalgia, occipital neuralgia chronic migraine, thyroid nodule, gait abnormality, weakness among    PE:  Blood pressure 134/79 pulse 62 temperature 96.8° height 5 ft 9 in weight 174 lb  General:  Alert and oriented gentleman in no acute distress  Both ears were examined under the  microscope in the micro procedure room.  A minute amount of cerumen is removed from each ear canal.  Both eardrums are intact and clear as visualized directly.  He is immediately directed towards NAVEEN Valdez's office for evaluation of his tinnitus masking hearing aid device.      DIAGNOSIS:     ICD-10-CM ICD-9-CM    1. Tinnitus, bilateral H93.13 388.30    2. Hearing loss, sensorineural, high frequency, bilateral H91.93 389.8      PLAN: Most recent audiogram reviewed  Pt. directed to NAVEEN Valdez ( or ANKITA Lu) re: benefit of new hearing aid technology re: tinnitus control  Pt. may be interested in tinnitus retraining if offered here

## 2018-12-05 NOTE — PROGRESS NOTES
[facility]  Physical Therapy Aquatic Treatment     Name: Raffy Rutehrford Jr.  Clinic Number: [id number]     Therapy Diagnosis: No diagnosis found.  Physician: Jason Caldwell MD     Physician Orders: PT Eval and Treat Low back pain w/ bilat radiculopathy  Medical Diagnosis from Referral: SI joint dysfunction, s/p lumbar fusion, s/p R SHARAD, polyneuropathy  Evaluation Date: 11/15/2018  Authorization Period Expiration: 12/31/18  Plan of Care Expiration: 2/15/19  Visit # / Visits authorized: 5/ 12     Time In: 1430   Time Out: 1535  Total Treatment Time: 65 minutes  Total Billable Time: 65 minutes     Precautions: Standard       Subjective  Pt reports: that he spent a lot of time in the car today and that has increased his LBP some.    Pt pain level: 6/10 LBP and 4/10 RLE symptoms     Objective    Treatment: Pt was instructed in and performed therapeutic exercises to develop hip stabilization/flexibility, BLE strengthening/flexibility, ankle stabilization and gait tx for 65 minutes. Patient performed therapeutic exercises consisting of the following exercises:    Warm-up Laps 3 x each  fwd/bkw/lat     Stretches: 2 x 30 sec  HS  Piriformis RLE only  gastroc stretch     BLE/Hip stabilization:   Heel Raise with GS 30x  Mini Squats with QS 15x c/ UE support and 15x s/ UE support for increased   Step ups 30x s/ UE support  Step downs 20x c/ UE support pt reported min mm activation  Stationary lunges 1 set to quad fatigue s/ UE support  Hip abd/add 30x  Hip hikes 30x  Abduction walks in mini squat stance and B internal hip rotation 2 laps  Walking lunges with verbal cues for strong push off 3 laps     Ankle stabilization:  Heel walks in // bars 2 laps   Toe walks in // bars with UE support 2 laps  Static standing in tandem stance 2x30 sec with each LE in the posterior position  Tandem walks in // bars with occasional UE support 2 laps  Marching with 3 sec holds 2 laps    Core:  Shoulder abd/add in  modified tandem with apc 30x  Shoulder flx/ext in modified tandem with apc 30x  Shoulder extensions with 1 DB submersion just below surface in long lever arm position 10x 10 sec each  Wall sits w/ aquatic 1 DB in long lever arm position 3x30 sec  Static standing in mini squat position with blue disc submersion chest press 2x10    Gait tx:  fwd/bkw weight shifting 3 min  fwd ambulation with verbal cues for proper push off and heel strike 4 laps    Issued HEP: 11/21/18 piriformis stretch, PPT with TA bracing, bridging, s/l clamshells, gastroc stretch, ankle 4-way with resistance band, marble  with toes, toe intrinsic strengthening flx/ext/abd/add ([id number]).      Assessment  Pt demonstrated increased fluidity and decreased effort during today's tx visit. Tx progression was tolerated well with with no increase in symptoms. Tx focused on BLE flexibility/strengthening, core/hip stabilization, ankle stabilization and gait tx. Pt continued to demonstrate improved ankle stability when compared to previous tx visit but could benefit from continued ankle stabilization activities during subsequent tx visits. Will progress as tolerated. Patient will continue to benefit from skilled PT intervention.    Raffy Rutherford Jr. is making good progress towards established goals.    Anticipated barriers to physical therapy: None    Goals:  Short Term Goals: 3 weeks   1. Pt will be able to tolerate 20 minutes of driving reporting no more than 10% increase in back pain/ radicular sx  2. Pt will be able to ascend/descend 12 steps with one railing reporting no increase in pain in order to be able to navigate to work  3. Pt will be independent with HEP and report compliance at least 5 days/week     Long Term Goals: 12 weeks   1. Pt will demonstrate bilat LE MMT of at least 4/5 throughout to demonstrate improved functional mobility  2. 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 Pt will be independent with gym based strengthening routine and demonstrate good understanding of what exercises/ positions to avoid in order to maintain strength gains and prevent sx exacerbation       Plan  Continue POC established by PT.

## 2018-12-05 NOTE — PATIENT INSTRUCTIONS
Most recent audiogram reviewed  Pt. directed to NAVEEN Valdez ( or ANKITA Lu) re: benefit of new hearing aid technology re: tinnitus control  Pt. may be interested in tinnitus retraining if offered here

## 2018-12-05 NOTE — PROGRESS NOTES
Increased to target settings, adjusted emil programs. We did some aided testing in the [facility]-the patient was counseled on the limitation of aided testing with noise cancellation hearing aid technology and monaural hearing aids. The patient will return on 12/11/18 for another [facility] appointment. At that time, he may try another hearing aid to determine if he notices benefit with binaural amplification vs. Monaural.

## 2018-12-06 ENCOUNTER — ANESTHESIA (OUTPATIENT)
Dept: SURGERY | Facility: HOSPITAL | Age: 68
End: 2018-12-06
Payer: COMMERCIAL

## 2018-12-06 ENCOUNTER — HOSPITAL ENCOUNTER (OUTPATIENT)
Facility: HOSPITAL | Age: 68
Discharge: HOME | End: 2018-12-06
Attending: NEUROLOGICAL SURGERY | Admitting: NEUROLOGICAL SURGERY
Payer: COMMERCIAL

## 2018-12-06 DIAGNOSIS — M53.3 SACROILIAC JOINT DYSFUNCTION OF RIGHT SIDE: Primary | ICD-10-CM

## 2018-12-06 PROCEDURE — 25000003 PHARM REV CODE 250: Performed by: NURSE ANESTHETIST, CERTIFIED REGISTERED

## 2018-12-06 PROCEDURE — 63600175 PHARM REV CODE 636 W HCPCS: Performed by: NEUROLOGICAL SURGERY

## 2018-12-06 PROCEDURE — 37000009 HC ANESTHESIA EA ADD 15 MINS: Performed by: NEUROLOGICAL SURGERY

## 2018-12-06 PROCEDURE — 25000003 PHARM REV CODE 250: Performed by: NEUROLOGICAL SURGERY

## 2018-12-06 PROCEDURE — 36000704 HC OR TIME LEV I 1ST 15 MIN: Performed by: NEUROLOGICAL SURGERY

## 2018-12-06 PROCEDURE — 25500020 PHARM REV CODE 255: Performed by: NEUROLOGICAL SURGERY

## 2018-12-06 PROCEDURE — 36000705 HC OR TIME LEV I EA ADD 15 MIN: Performed by: NEUROLOGICAL SURGERY

## 2018-12-06 PROCEDURE — S0020 INJECTION, BUPIVICAINE HYDRO: HCPCS | Performed by: NEUROLOGICAL SURGERY

## 2018-12-06 PROCEDURE — 37000008 HC ANESTHESIA 1ST 15 MINUTES: Performed by: NEUROLOGICAL SURGERY

## 2018-12-06 PROCEDURE — 63600175 PHARM REV CODE 636 W HCPCS: Performed by: NURSE ANESTHETIST, CERTIFIED REGISTERED

## 2018-12-06 PROCEDURE — 27096 INJECT SACROILIAC JOINT: CPT | Mod: RT,,, | Performed by: NEUROLOGICAL SURGERY

## 2018-12-06 PROCEDURE — 71000015 HC POSTOP RECOV 1ST HR: Performed by: NEUROLOGICAL SURGERY

## 2018-12-06 RX ORDER — PROPOFOL 10 MG/ML
VIAL (ML) INTRAVENOUS
Status: DISCONTINUED | OUTPATIENT
Start: 2018-12-06 | End: 2018-12-06

## 2018-12-06 RX ORDER — MUPIROCIN 20 MG/G
1 OINTMENT TOPICAL 2 TIMES DAILY
Status: CANCELLED | OUTPATIENT
Start: 2018-12-06 | End: 2018-12-11

## 2018-12-06 RX ORDER — SODIUM CHLORIDE, SODIUM LACTATE, POTASSIUM CHLORIDE, CALCIUM CHLORIDE 600; 310; 30; 20 MG/100ML; MG/100ML; MG/100ML; MG/100ML
INJECTION, SOLUTION INTRAVENOUS CONTINUOUS PRN
Status: DISCONTINUED | OUTPATIENT
Start: 2018-12-06 | End: 2018-12-06

## 2018-12-06 RX ORDER — LIDOCAINE HCL/PF 100 MG/5ML
SYRINGE (ML) INTRAVENOUS
Status: DISCONTINUED | OUTPATIENT
Start: 2018-12-06 | End: 2018-12-06

## 2018-12-06 RX ORDER — ONDANSETRON 2 MG/ML
4 INJECTION INTRAMUSCULAR; INTRAVENOUS DAILY PRN
Status: CANCELLED | OUTPATIENT
Start: 2018-12-06

## 2018-12-06 RX ORDER — LIDOCAINE HYDROCHLORIDE 10 MG/ML
INJECTION INFILTRATION; PERINEURAL
Status: DISCONTINUED | OUTPATIENT
Start: 2018-12-06 | End: 2018-12-06 | Stop reason: HOSPADM

## 2018-12-06 RX ORDER — ACETAMINOPHEN 325 MG/1
325 TABLET ORAL EVERY 6 HOURS PRN
Status: CANCELLED | OUTPATIENT
Start: 2018-12-06

## 2018-12-06 RX ORDER — SODIUM CHLORIDE 0.9 % (FLUSH) 0.9 %
3 SYRINGE (ML) INJECTION
Status: DISCONTINUED | OUTPATIENT
Start: 2018-12-06 | End: 2018-12-06 | Stop reason: HOSPADM

## 2018-12-06 RX ORDER — BUPIVACAINE HYDROCHLORIDE 2.5 MG/ML
INJECTION, SOLUTION INFILTRATION; PERINEURAL
Status: DISCONTINUED | OUTPATIENT
Start: 2018-12-06 | End: 2018-12-06 | Stop reason: HOSPADM

## 2018-12-06 RX ORDER — MIDAZOLAM HYDROCHLORIDE 1 MG/ML
INJECTION, SOLUTION INTRAMUSCULAR; INTRAVENOUS
Status: DISCONTINUED | OUTPATIENT
Start: 2018-12-06 | End: 2018-12-06

## 2018-12-06 RX ORDER — MEPERIDINE HYDROCHLORIDE 50 MG/ML
12.5 INJECTION INTRAMUSCULAR; INTRAVENOUS; SUBCUTANEOUS ONCE AS NEEDED
Status: CANCELLED | OUTPATIENT
Start: 2018-12-06 | End: 2018-12-06

## 2018-12-06 RX ADMIN — SODIUM CHLORIDE, SODIUM LACTATE, POTASSIUM CHLORIDE, AND CALCIUM CHLORIDE: .6; .31; .03; .02 INJECTION, SOLUTION INTRAVENOUS at 08:12

## 2018-12-06 RX ADMIN — LIDOCAINE HYDROCHLORIDE 50 MG: 20 INJECTION, SOLUTION INTRAVENOUS at 08:12

## 2018-12-06 RX ADMIN — PROPOFOL 50 MG: 10 INJECTION, EMULSION INTRAVENOUS at 08:12

## 2018-12-06 RX ADMIN — MIDAZOLAM 2 MG: 1 INJECTION INTRAMUSCULAR; INTRAVENOUS at 08:12

## 2018-12-06 NOTE — TRANSFER OF CARE
"Anesthesia Transfer of Care Note    Patient: Raffy Rutherford Jr.    Procedure(s) Performed: Procedure(s) (LRB):  INJECTION, STEROID Right SI Joint Block and Steroid Injection (Right)    Patient location: OPS    Anesthesia Type: MAC    Transport from OR: Transported from OR on room air with adequate spontaneous ventilation    Post pain: adequate analgesia    Post assessment: no apparent anesthetic complications    Post vital signs: stable    Level of consciousness: responds to stimulation and sedated    Nausea/Vomiting: no nausea/vomiting    Complications: none    Transfer of care protocol was followed      Last vitals:   Visit Vitals  /71 (BP Location: Right arm, Patient Position: Sitting)   Pulse (!) 53   Temp 36.8 °C (98.2 °F) (Skin)   Resp 16   Ht 5' 9" (1.753 m)   Wt 77.1 kg (170 lb)   SpO2 (!) 93%   BMI 25.10 kg/m²     "

## 2018-12-06 NOTE — DISCHARGE INSTRUCTIONS
Home Care Instructions Pain Management:    1.  DIET:    You may resume your normal diet today.    2.  BATHING:    You may shower with luke warm water.    3.  DRESSING:    You may remove your bandage today.    4.  ACTIVITY LEVEL:      You may resume your normal activities 24 hours after your procedure.    5.  MEDICATIONS:    You may resume your normal medications today.    6.  SPECIAL INSTRUCTIONS:    No heat to the injection site for 24 hours including bath or shower, heating pad, moist heat or hot tubs.    Use an ice pack to the injection site for any pain or discomfort.  Apply ice packs for 20 minute intervals as needed.    If you have received any sedatives by mouth today, you can not drive for 12 hours.    If you have received sedation through an IV, you can not drive for 24 hours.    PLEASE CALL YOUR DOCTOR FOR THE FOLLOWIN.  Redness or swelling around the injection site.  2.  Fever of 101 degrees.  3.  Drainage (pus) from the injection site.  4.  For any continuous bleeding (some dried blood over the incision is normal.)    FOR EMERGENCIES:    If any unusual problems or difficulties occur during clinic hours, call [phone] or dial [phone].    Follow up with with your physician as directed  ANESTHESIA  -For the first 24 hours after surgery:  Do not drive, use heavy equipment, make important decisions, or drink alcohol  -It is normal to feel sleepy for several hours.  Rest until you are more awake.  -Have someone stay with you, if needed.  They can watch for problems and help keep you safe.  -Some possible post anesthesia side effects include: nausea and vomiting, sore throat and hoarseness, sleepiness, and dizziness.    PAIN  -If you have pain after surgery, pain medicine will help you feel better.  Take it as directed, before pain becomes severe.  Most pain relievers taken by mouth need at least 20-30 minutes to start working.  -Do not drive or drink alcohol while taking pain medicine.  -Pain  medication can upset your stomach.  Taking them with a little food may help.  -Other ways to help control pain: elevation, ice, and relaxation  -Call your surgeon if still having unmanageable pain an hour after taking pain medicine.  -Pain medicine can cause constipation.  Taking an over-the counter stool softener while on prescription pain medicine and drinking plenty of fluids can prevent this side effect.  -Call your surgeon if you have severe side effects like: breathing problems, trouble waking up, dizziness, confusion, or severe constipation.    NAUSEA  -Some people have nausea after surgery.  This is often because of anesthesia, pain, pain medicine, or the stress of surgery.  -Do not push yourself to eat.  Start off with clear liquids and soup.  Slowly move to solid foods.  Don't eat fatty, rich, spicy foods at first.  Eat smaller amounts.  -If you develop persistent nausea and vomiting please notify your surgeon immediately.    BLEEDING  -Different types of surgery require different types of care and dressing changes.  It is important to follow all instructions and advice from your surgeon.  Change dressing as directed.  Call your surgeon for any concerns regarding postop bleeding.    SIGNS OF INFECTION  -Signs of infection include: fever, swelling, drainage, and redness  -Notify your surgeon if you have a fever of 100.4 F (38.0 C) or higher.  -Notify your surgeon if you notice redness, swelling, increased pain, pus, or a foul smell at the incision site.

## 2018-12-06 NOTE — ANESTHESIA PREPROCEDURE EVALUATION
12/06/2018  Raffy Rutherford Jr. is a 68 y.o., male.    Anesthesia Evaluation     I have reviewed the Nursing Notes.   I have reviewed the Medications.     Review of Systems  Anesthesia Hx:  No problems with previous Anesthesia Denies Hx of Anesthetic complications Denies Family Hx of Anesthesia complications.    Social:  Non-Smoker, No Alcohol Use    Hematology/Oncology:  Hematology Normal   Oncology Normal     EENT/Dental:EENT/Dental Normal   Cardiovascular:  Cardiovascular Normal Exercise tolerance: good     Pulmonary:  Pulmonary Normal    Renal/:  Renal/ Normal     Hepatic/GI:  Hepatic/GI Normal    Musculoskeletal:  Musculoskeletal Normal    Neurological:  Neurology Normal    Endocrine:  Endocrine Normal        Physical Exam  General:  Well nourished    Airway/Jaw/Neck:  Airway Findings: Mouth Opening: Normal Tongue: Normal  General Airway Assessment: Adult  Mallampati: II  TM Distance: Normal, at least 6 cm  Jaw/Neck Findings:     Neck ROM: Normal ROM      Dental:  Dental Findings: In tact        Mental Status:  Mental Status Findings:  Cooperative, Alert and Oriented         Anesthesia Plan  Type of Anesthesia, risks & benefits discussed:  Anesthesia Type:  MAC  Patient's Preference: MAC  Intra-op Monitoring Plan:   Intra-op Monitoring Plan Comments:   Post Op Pain Control Plan:   Post Op Pain Control Plan Comments:   Induction:   IV  Beta Blocker:         Informed Consent: Patient understands risks and agrees with Anesthesia plan.  Questions answered. Anesthesia consent signed with patient.  ASA Score: 2     Day of Surgery Review of History & Physical:            Ready For Surgery From Anesthesia Perspective.

## 2018-12-06 NOTE — H&P
NEUROSURGICAL H&P NOTE   DATE OF SERVICE:   12/06/2018   ATTENDING PHYSICIAN:   Jason Caldwell MD   CONSULT REQUESTED BY:   Nini Rendon MD   REASON FOR CONSULT:   Legs weakness, right hip, low back pain.   SUBJECTIVE:   HISTORY OF PRESENT ILLNESS:   This is a very pleasant 68 y.o. male who is s/p L3-4 and L4-5 lateral fusion by Dr Browne in 06/2015. 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 He is followed in neurology by Dr Jacob at [facility]. He complains of dorsiflexion and plantar flexion weakness bilaterally. The weakness has been stable for the last 6 months. He uses bilateral AFO braces. Complains of right-sided low back pain irradiating in the groin and buttock. Complains of pain in the ight L5 distribution that is burning. Foot pain is associated with numbness.   Low Back Pain Scale   R Low Back-Pain Score: 8   R Low Back-Pain Intensity: Pain killers give moderate relief from pain   R Low Back-Pain Score: I need some help, but manage most of my personal care   Low Back-Lifting: Pain prevents me from lifting heavy weights but I can manage light weights if they are conveniently placed   Low Back-Walking: Pain prevents me walking more than 1 mile   Low Back-Sitting: Pain prevents me from sitting more than 30 minutes   Low Back-Standing: I cannot stand for longer than 30 mins without increasing pain   Low Back-Sleeping: I have pain in bed but it does not prevent me from sleeping well   Low Back-Social Life: Pain has no significant effect on my social life apart from limiting my more en   Low Back-Traveling: I have extra pain while traveling but it does not compel me to seek alternate forms of travel   Low Back-Changing Degree of Pain: my pain is rapidly worsening     PAST MEDICAL HISTORY:        Active Ambulatory Problems    Diagnosis Date Noted    Depression     Hyperlipidemia     Chronic pain     Colon polyp     Adenomatous polyp     History of prostate  biopsy     GERD (gastroesophageal reflux disease)     Back pain     Sleep apnea     Visual disturbances 10/03/2012    Spondylosis without myelopathy 11/09/2012    Degeneration of lumbar or lumbosacral intervertebral disc 11/09/2012    Spinal stenosis, lumbar region, without neurogenic claudication 11/09/2012    Thoracic or lumbosacral neuritis or radiculitis, unspecified 11/09/2012    Displacement of lumbar intervertebral disc without myelopathy 11/09/2012    Acquired spondylolisthesis 11/09/2012    Lumbago 11/09/2012    Status post cataract extraction and insertion of intraocular lens - Both Eyes 11/13/2012    Prostatitis, acute 12/17/2012    Fibromyalgia 10/21/2013    OP (osteoporosis) 10/21/2013    Compression fracture of T12 vertebra 10/21/2013    Diverticulitis large intestine 12/21/2013    Osteoarthritis 03/19/2014    Lower back pain 04/01/2014    Lower extremity pain 04/01/2014    Decreased strength 04/01/2014    Range of motion deficit 04/01/2014    Special screening for malignant neoplasms, colon 05/05/2014    Cervicalgia 06/19/2014    Facet joint disease of cervical region 06/19/2014    Occipital neuralgia 06/19/2014    Chronic migraine without aura, with intractable migraine, so stated, with status migrainosus 06/19/2014    Cervical radiculopathy 06/19/2014    Paroxysmal hemicrania 06/19/2014    Sciatic nerve pain 06/19/2014    Chronic LBP 06/27/2014    DJD (degenerative joint disease) of lumbar spine 06/27/2014    Chronic neck pain 06/27/2014    Right lumbar radiculopathy 06/27/2014    Thyroid nodule 07/16/2014    Carpal tunnel syndrome of right wrist 07/16/2014    Paresthesia 08/01/2014    Brow ptosis 02/10/2015    Degenerative disc disease 06/22/2015    Encounter for postoperative wound check 08/01/2015    Lumbar radiculopathy 09/15/2015    Cervical spondylosis 10/22/2015    Low back pain without sciatica 10/27/2015    Lumbar radicular pain 10/27/2015    S/P  lumbar spinal fusion 12/02/2015    Gait abnormality 02/21/2017    Impaired functional mobility, balance, gait, and endurance 02/24/2017    Left hip pain 05/24/2017    Right wrist tendinitis 07/28/2017    Pain in right wrist 07/28/2017    Difficulty walking 11/01/2017    Weakness 11/01/2017    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    Headache around the eyes 06/26/2018          Resolved Ambulatory Problems    Diagnosis Date Noted    Blepharitis of both eyes - Both Eyes 11/13/2012          Past Medical History:   Diagnosis Date    Adenomatous polyp 2003    Adenomatous polyp     Allergy     Arthritis     Back pain     Cataract     Chronic pain     Cluster headache 2013    Colon polyp     Degenerative disc disease     Depression     Diverticulitis 12/2013    Elevated PSA     Fibromyalgia 2013    GERD (gastroesophageal reflux disease)     Hepatitis 1970's    History of prostate biopsy 2002    Hyperlipidemia     Joint pain     Sleep apnea     Special screening for malignant neoplasms, colon 5/5/2014    Thyroid nodule 7/16/2014    Visual disturbance 2012     PAST SURGICAL HISTORY:         Past Surgical History:   Procedure Laterality Date    BACK SURGERY      BLEPHAROPLASTY UPPER LID Bilateral 2/10/2015    Performed by Rhett Lainez III, MD at [facility] OR 2ND FLR    BLOCK, GANGLION IMPAR N/A 6/25/2013    Performed by [facility]aceli [facility] at [facility] PAIN MGT    CATARACT EXTRACTION W/ INTRAOCULAR LENS IMPLANT Bilateral     CHOLECYSTECTOMY      COLONOSCOPY N/A 5/5/2014    Performed by Pedro Carlos MD at [facility] ENDO (4TH FLR)    COSMETIC SURGERY  2/10/2015    Direct mid-forehead brow lift    COSMETIC SURGERY  2/10/2015    Bilateral upper lid blepahroplasty    ESOPHAGOGASTRODUODENOSCOPY (EGD) N/A 4/28/2015    Performed by Amadou Hardin MD at [facility] ENDO (4TH FLR)    EYE SURGERY      FUSION EXTREME LATERAL N/A 6/22/2015    Performed by Milad [facility]ne MD at  [facility] OR 2ND FLR    HEMORRHOID SURGERY      with complication of chronic bleeding for 6 weeks     JOINT REPLACEMENT      KNEE SURGERY      involving arthroscopic surgery to both knees    LIFT-BROW midforehead direct Bilateral 2/10/2015    Performed by Rhett Lainez III, MD at [facility] OR 2ND FLR    SINUS SURGERY      SINUS SURGERY      left molar and sinus surgery for trigeminal neuralgia    SPINAL FUSION  6/22/2015    L3-L5 XLIF    SPINE SURGERY  6/22/15    XLIF/TANA    TOTAL HIP ARTHROPLASTY  4/2012    Pt states he had total hip replacement on his left hip.     SOCIAL HISTORY:   Social History                                                                                                                                                                 FAMILY HISTORY:         Family History   Problem Relation Age of Onset    Cancer Mother     colon; uterine    Nephrolithiasis Mother     Stroke Mother 86    Pancreatitis Brother     Vision loss Brother     macular degeneration    Diabetes Brother     Macular degeneration Brother     Migraines Sister     No Known Problems Father     No Known Problems Maternal Grandmother     No Known Problems Maternal Grandfather     No Known Problems Paternal Grandmother     No Known Problems Paternal Grandfather     No Known Problems Sister     No Known Problems Maternal Aunt     No Known Problems Maternal Uncle     No Known Problems Paternal Aunt     No Known Problems Paternal Uncle     Melanoma Neg Hx     Amblyopia Neg Hx     Blindness Neg Hx     Cataracts Neg Hx     Glaucoma Neg Hx     Hypertension Neg Hx     Retinal detachment Neg Hx     Strabismus Neg Hx     Thyroid disease Neg Hx     Allergic rhinitis Neg Hx     Allergies Neg Hx     Angioedema Neg Hx     Asthma Neg Hx     Eczema Neg Hx     Urticaria Neg Hx     Rhinitis Neg Hx     Immunodeficiency Neg Hx     Atopy Neg Hx      CURRENTS MEDICATIONS:          Current Outpatient  Medications on File Prior to Visit   Medication Sig Dispense Refill    butalbital-acetaminophen-caffeine -40 mg (FIORICET, ESGIC) -40 mg per tablet Take 1 tablet by mouth daily as needed for Pain. headache 30 tablet 3    butalbital-aspirin-caffeine -40 mg (FIORINAL) -40 mg Cap Take 1 capsule by mouth daily as needed. headache 30 capsule 3    clonazePAM (KLONOPIN) 0.5 MG tablet Take 1 tablet (0.5 mg total) by mouth 2 (two) times daily as needed for Anxiety. 60 tablet 2    fish oil-omega-3 fatty acids 300-1,000 mg capsule Take 2 g by mouth once daily.      HYDROcodone-acetaminophen (NORCO) 5-325 mg per tablet Take 1 tablet by mouth every 6 (six) hours. As needed  0    lamoTRIgine (LAMICTAL) 200 MG tablet Take one tablet by mouth once daily 90 tablet 3    pravastatin (PRAVACHOL) 40 MG tablet Take 1 tablet (40 mg total) by mouth once daily. 90 tablet 3    RABEprazole (ACIPHEX) 20 mg tablet Take 1 tablet (20 mg total) by mouth 2 (two) times daily. 180 tablet 3    selegiline (EMSAM) 12 mg/24 hr Place 1 patch onto the skin once daily. 90 patch 3    senna-docusate 8.6-50 mg (PERICOLACE) 8.6-50 mg per tablet Take 2 tablets by mouth nightly as needed for Constipation.      sucralfate (CARAFATE) 1 gram tablet as needed.       timolol maleate 0.5% (TIMOPTIC-XE) 0.5 % SolG Place 1 drop into the left eye once daily. 5 mL 4    traZODone (DESYREL) 100 MG tablet Take one tablet by mouth every night at bedtime 90 tablet 3    VOLTAREN 1 % Gel        No current facility-administered medications on file prior to visit.      ALLERGIES:         Review of patient's allergies indicates:   Allergen Reactions    Alphagan [brimonidine]      Patient taking MASO-B Selective Inhibitor Selegiline (Emsam)    Coumadin [warfarin]      itch    Oxycodone      hiccups   REVIEW OF SYSTEMS:   Review of Systems   Constitutional: Negative for diaphoresis, fever and weight loss.   Respiratory: Negative for shortness  of breath.   Cardiovascular: Negative for chest pain.   Gastrointestinal: Negative for blood in stool.   Genitourinary: Negative for hematuria.   Endo/Heme/Allergies: Does not bruise/bleed easily.   All other systems reviewed and are negative.     OBJECTIVE:   PHYSICAL EXAMINATION:   There were no vitals filed for this visit.   Physical Exam:   Vitals reviewed.   Constitutional: He appears well-developed and well-nourished.   Eyes: Pupils are equal, round, and reactive to light. Conjunctivae and EOM are normal.   Cardiovascular: Normal distal pulses and no edema.   Abdominal: Soft.   Skin: Skin displays no rash on trunk and no rash on extremities. Skin displays no lesions on trunk and no lesions on extremities.     Psych/Behavior: He is alert. He is oriented to person, place, and time. He has a normal mood and affect.     Musculoskeletal:   Neck: Range of motion is full.     Neurological:   DTRs: Tricep reflexes are 2+ on the right side and 2+ on the left side. Bicep reflexes are 2+ on the right side and 2+ on the left side. Brachioradialis reflexes are 2+ on the right side and 2+ on the left side. Patellar reflexes are 2+ on the right side and 2+ on the left side. Achilles reflexes are 0 on the right side and 0 on the left side.     Back Exam   Tenderness   The patient is experiencing tenderness in the sacroiliac (right).   Range of Motion   Extension: normal   Flexion: abnormal   Lateral bend right: normal   Lateral bend left: normal   Rotation right: normal   Rotation left: normal   Muscle Strength   Right Quadriceps: 5/5   Left Quadriceps: 5/5   Right Hamstrings: 5/5   Left Hamstrings: 5/5   Tests   Straight leg raise right: negative   Straight leg raise left: negative   Other   Toe walk: abnormal   Heel walk: abnormal     SI joint:   Palpation at the right SI joint is painful   ABDIEL test is positive on the right side   Gaenslen test is positive on the right side   Thigh thrust test is positive on the right  side   Neurologic Exam   Mental Status   Oriented to person, place, and time.   Speech: speech is normal   Level of consciousness: alert   Cranial Nerves   Cranial nerves II through XII intact.   CN III, IV, VI   Pupils are equal, round, and reactive to light.  Extraocular motions are normal.   Motor Exam   Muscle bulk: decreased (bilateral gastroc and TA)  Overall muscle tone: normal   Strength   Right deltoid: 5/5   Left deltoid: 5/5   Right biceps: 5/5   Left biceps: 5/5   Right triceps: 5/5   Left triceps: 5/5   Right wrist flexion: 5/5   Left wrist flexion: 5/5   Right wrist extension: 5/5   Left wrist extension: 5/5   Right interossei: 5/5   Left interossei: 5/5   Right iliopsoas: 5/5   Left iliopsoas: 5/5   Right quadriceps: 5/5   Left quadriceps: 5/5   Right hamstrin/5   Left hamstrin/5   Right anterior tibial: 4/5   Left anterior tibial: 4/5   Right posterior tibial: 4/5   Left posterior tibial: 4/5   Right peroneal: 4/5   Left peroneal: 4/5   Right gastroc: 4/5   Left gastroc: 4/5   Sensory Exam   Right leg light touch: decreased from knee (L5 distrib)  Left leg light touch: decreased from knee   Gait, Coordination, and Reflexes   Gait   Gait: normal   Coordination   Finger to nose coordination: normal  Tandem walking coordination: normal   Reflexes   Right brachioradialis: 2+  Left brachioradialis: 2+  Right biceps: 2+  Left biceps: 2+  Right triceps: 2+  Left triceps: 2+  Right patellar: 2+  Left patellar: 2+  Right achilles: 0  Left achilles: 0  Right plantar: normal  Left plantar: normal  Right De Leon: absent  Left De Leon: absent  Right ankle clonus: absent  Left ankle clonus: absent     DIAGNOSTIC DATA:   I personally interpreted the following imaging:   Lumbar spine MRI 2018: L3 to L5 fusion, no stenosis or nerve root compression   ASSESMENT:   This is a 68 y.o. male with       Problem List Items Addressed This Visit       None                  Visit Diagnoses       Sacroiliac joint  dysfunction of right side - Primary    Relevant Orders    Ambulatory consult to Physical Therapy    S/P lumbar fusion     Relevant Orders    Ambulatory consult to Physical Therapy    S/P hip replacement, right     Relevant Orders    Ambulatory consult to Physical Therapy    Polyneuropathy     Relevant Orders    Ambulatory consult to Physical Therapy          PLAN:   Right SI joint PT 3 times a week for 6 weeks   Right SI joint block and steroid injection.   SI joint belt   Will get paper charts from U   Compound cream for neuropathic pain   Refill Norco 5 (60 pills)     Jason Caldwell MD   Cell:[phone]

## 2018-12-06 NOTE — ANESTHESIA POSTPROCEDURE EVALUATION
"Anesthesia Post Evaluation    Patient: Raffy Rutherford Jr.    Procedure(s) Performed: Procedure(s) (LRB):  INJECTION, STEROID Right SI Joint Block and Steroid Injection (Right)    Final Anesthesia Type: MAC  Patient location during evaluation: OPS  Patient participation: Yes- Able to Participate  Level of consciousness: sedated and responds to stimulation  Post-procedure vital signs: reviewed and stable  Pain management: adequate  Airway patency: patent  PONV status at discharge: No PONV  Anesthetic complications: no      Cardiovascular status: blood pressure returned to baseline and hemodynamically stable  Respiratory status: unassisted and room air  Hydration status: euvolemic  Follow-up not needed.        Visit Vitals  /71 (BP Location: Right arm, Patient Position: Sitting)   Pulse (!) 53   Temp 36.8 °C (98.2 °F) (Skin)   Resp 16   Ht 5' 9" (1.753 m)   Wt 77.1 kg (170 lb)   SpO2 (!) 93%   BMI 25.10 kg/m²       Pain/Tequila Score: Pain Assessment Performed: Yes (12/6/2018  6:50 AM)  Presence of Pain: complains of pain/discomfort (12/6/2018  6:50 AM)        "

## 2018-12-06 NOTE — OP NOTE
DATE OF PROCEDURE: 12/6/2018     PREOPERATIVE DIAGNOSES:  1. 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 Right sacroiliac joint block and steroid injection  2. Fluoroscopy     PRIMARY SURGEON: Jason Caldwell M.D.     ASSISTANT SURGEON: DEANN        INDICATION FOR PROCEDURE: This is a 68-year-old male, s/p L3-5 fusion, who presented with right SI joint pain that was refractory to conservative treatment. The pain irradiates in the anterior thigh and buttock. With walking the pain can increase to 8/10 on average. Risks, benefits, alternative and limitation were discussed with the patient. The risk included nerve root injury that can cause paralysis, cerebrospinal fluid leak, wound infection and blood loss. The patient wanted to proceed and a consent was obtained.      DESCRIPTION OF THE PROCEDURE     The patient was placed on MAC anesthesia and was prone on the Favian table.  All pressure points were carefully padded. The patient was prepped and draped   in the typical sterile fashion and the incision was made with a #15 blade.   Hemostasis was complete and the gluteal fascia was penetrated with a k-wire. Using the lateral and outlet views, and after local anesthesia with 1% lidocaine, the 22 spinal diego needle was inserted inside the right sacroiliac joint and 1cc of Omnipaque was injected to confirm the needle tip placement.  We then injected a mixture of 1cc of 40mg of Depo-Medrol and 2 cc of 0.25% marcaine. The wounds were dressed with a sterile dressing. The blood loss was less than 1 cc. The final count was completed and nothing was missing. There was no complication.

## 2018-12-07 VITALS
WEIGHT: 170 LBS | HEART RATE: 66 BPM | TEMPERATURE: 99 F | RESPIRATION RATE: 18 BRPM | BODY MASS INDEX: 25.18 KG/M2 | SYSTOLIC BLOOD PRESSURE: 115 MMHG | HEIGHT: 69 IN | OXYGEN SATURATION: 98 % | DIASTOLIC BLOOD PRESSURE: 63 MMHG

## 2018-12-08 NOTE — DISCHARGE SUMMARY
[facility]  Neurosurgery  Discharge Summary      Patient Name: Raffy Rutherford Jr.  MRN: [id number]  Admission Date: 12/6/2018  Hospital Length of Stay: 0 days  Discharge Date and Time: 12/6/2018  9:45 AM  Attending Physician: No att. providers found   Discharging Provider: Jason Caldwell MD  Primary Care Provider: Nini Rendon MD     HPI: Patient with lumbar fusion, right SI joint dysfunction    Procedure(s) (LRB):  INJECTION, STEROID Right SI Joint Block and Steroid Injection (Right)     Hospital Course: Procedure uncomplicated. Discharged home.     Consults:     Significant Diagnostic Studies: NA    Pending Diagnostic Studies:     None        There are no hospital problems to display for this patient.     Discharged Condition: good    Disposition: Home or Self Care    Follow Up:  Follow-up Information     Jason Caldwell MD In 2 weeks.    Specialty:  Neurosurgery  Contact information:  [address]  [address]  [facility] [id number] [phone]                 Patient Instructions:      Diet general     Medications:  Reconciled Home Medications:      Medication List      CONTINUE taking these medications    clonazePAM 0.5 MG tablet  Commonly known as:  KLONOPIN  Take 1 tablet (0.5 mg total) by mouth 2 (two) times daily as needed for Anxiety.     FIORINAL -40 mg Cap  Generic drug:  butalbital-aspirin-caffeine -40 mg  Take 1 capsule by mouth daily as needed. headache     fish oil-omega-3 fatty acids 300-1,000 mg capsule  Take 2 g by mouth once daily.     HYDROcodone-acetaminophen 5-325 mg per tablet  Commonly known as:  NORCO  Take 1 tablet by mouth every 6 (six) hours as needed for Pain. As needed     lamoTRIgine 200 MG tablet  Commonly known as:  LAMICTAL  Take one tablet by mouth once daily     pravastatin 40 MG tablet  Commonly known as:  PRAVACHOL  Take 1 tablet (40 mg total) by mouth once daily.     RABEprazole 20 mg tablet  Commonly known as:  ACIPHEX  Take 1 tablet (20 mg  total) by mouth 2 (two) times daily.     selegiline 12 mg/24 hr  Commonly known as:  EMSAM  Place 1 patch onto the skin once daily.     senna-docusate 8.6-50 mg 8.6-50 mg per tablet  Commonly known as:  PERICOLACE  Take 2 tablets by mouth nightly as needed for Constipation.     sucralfate 1 gram tablet  Commonly known as:  CARAFATE  as needed.     timolol maleate 0.5% 0.5 % Solg  Commonly known as:  TIMOPTIC-XE  Place 1 drop into the left eye once daily.     traZODone 100 MG tablet  Commonly known as:  DESYREL  Take one tablet by mouth every night at bedtime     VOLTAREN 1 % Gel  Generic drug:  diclofenac sodium            Jason Caldwell MD  Neurosurgery  [facility]

## 2018-12-10 ENCOUNTER — CLINICAL SUPPORT (OUTPATIENT)
Dept: REHABILITATION | Facility: HOSPITAL | Age: 68
End: 2018-12-10
Attending: NEUROLOGICAL SURGERY
Payer: COMMERCIAL

## 2018-12-10 ENCOUNTER — DOCUMENTATION ONLY (OUTPATIENT)
Dept: REHABILITATION | Facility: HOSPITAL | Age: 68
End: 2018-12-10

## 2018-12-10 DIAGNOSIS — R26.2 DIFFICULTY WALKING: ICD-10-CM

## 2018-12-10 DIAGNOSIS — M54.16 LUMBAR BACK PAIN WITH RADICULOPATHY AFFECTING LOWER EXTREMITY: ICD-10-CM

## 2018-12-10 DIAGNOSIS — R53.1 WEAKNESS: ICD-10-CM

## 2018-12-10 PROCEDURE — 97113 AQUATIC THERAPY/EXERCISES: CPT

## 2018-12-10 NOTE — PROGRESS NOTES
[facility]  Physical Therapy Aquatic Treatment     Name: Raffy Rutherford Jr.  Clinic Number: [id number]     Therapy Diagnosis: No diagnosis found.  Physician: Jason Caldwell MD     Physician Orders: PT Eval and Treat Low back pain w/ bilat radiculopathy  Medical Diagnosis from Referral: SI joint dysfunction, s/p lumbar fusion, s/p R SHARAD, polyneuropathy  Evaluation Date: 11/15/2018  Authorization Period Expiration: 12/31/18  Plan of Care Expiration: 2/15/19  Visit # / Visits authorized: 6/ 12     Time In: 1530   Time Out: 1630  Total Treatment Time: 60 minutes  Total Billable Time: 45 minutes     Precautions: Standard       Subjective  Pt reports: that he received an injection in low back last week and his back is pain free but still has RLE pain.    Pt pain level: 0/10 LBP and 4/10 RLE symptoms     Objective    Treatment: Pt was instructed in and performed therapeutic exercises to develop hip stabilization/flexibility, BLE strengthening/flexibility, ankle stabilization and gait tx for 60 minutes. Patient performed therapeutic exercises consisting of the following exercises:    Warm-up Laps 3 x each  fwd/bkw/lat     Stretches: 2 x 30 sec  HS  Piriformis RLE only  gastroc stretch     BLE/Hip stabilization:   Heel Raise with GS 30x  Mini Squats with QS 15x c/ UE support and 15x s/ UE support for increased   Fwd Step ups 30x s/ UE support  Lateral step ups 30x s/ UE support  Stationary lunges 1 set to quad fatigue s/ UE support np  Hip abd/add 30x  Hip hikes 30x np  Abduction walks in mini squat stance and B internal hip rotation 2 laps  Monster walks 2 laps  Walking lunges with verbal cues for strong push off 2 laps     Ankle stabilization:  Heel walks in // bars 2 laps   Toe walks in // bars with UE support 2 laps  Static standing in tandem stance 2x30 sec with each LE in the posterior position np  Tandem walks in // bars with occasional UE support 2 laps np  Marching with 3 sec holds 2  laps np    Core: NP  Shoulder abd/add in modified tandem with apc 30x  Shoulder flx/ext in modified tandem with apc 30x  Shoulder extensions with 1 DB submersion just below surface in long lever arm position 10x 10 sec each  Wall sits w/ aquatic 1 aquatic weights in long lever arm position 3x30 sec  Static standing in mini squat position with blue disc submersion chest press 2x10  Mini squat Hold - chest press with disc next visit    Gait tx:  fwd/bkw weight shifting 3 min  fwd ambulation with verbal cues for proper push off and heel strike 2 laps    Issued HEP: 11/21/18 piriformis stretch, PPT with TA bracing, bridging, s/l clamshells, gastroc stretch, ankle 4-way with resistance band, marble  with toes, toe intrinsic strengthening flx/ext/abd/add ([id number]).      Assessment  Pt presented without any LBP and min/mod RLE pain as pt received Right sacroiliac joint block and steroid injection last week. Pt continued to tolerate tx well but continues to demonstrate improper gait mechanics, including: limited B heel strikes during initial contact, improper push off in terminal stance and lack of B TKE during stance phase of gait. Pt could also benefit from continued core stabilization. Tx focused on BLE flexibility/strengthening, core/hip stabilization, ankle stabilization and gait tx. Will progress as tolerated. Patient will continue to benefit from skilled PT intervention.    Raffy Rutherford Jr. is making good progress towards established goals.    Anticipated barriers to physical therapy: None    Goals:  Short Term Goals: 3 weeks   1. Pt will be able to tolerate 20 minutes of driving reporting no more than 10% increase in back pain/ radicular sx  2. Pt will be able to ascend/descend 12 steps with one railing reporting no increase in pain in order to be able to navigate to work  3. Pt will be independent with HEP and report compliance at least 5 days/week     Long Term Goals: 12 weeks   1. Pt will demonstrate  bilat LE MMT of at least 4/5 throughout to demonstrate improved functional mobility  2. 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 Pt will be independent with gym based strengthening routine and demonstrate good understanding of what exercises/ positions to avoid in order to maintain strength gains and prevent sx exacerbation       Plan  Continue POC established by PT.

## 2018-12-11 ENCOUNTER — CLINICAL SUPPORT (OUTPATIENT)
Dept: REHABILITATION | Facility: HOSPITAL | Age: 68
End: 2018-12-11
Attending: NEUROLOGICAL SURGERY
Payer: COMMERCIAL

## 2018-12-11 DIAGNOSIS — M54.16 LUMBAR BACK PAIN WITH RADICULOPATHY AFFECTING LOWER EXTREMITY: ICD-10-CM

## 2018-12-11 PROCEDURE — 97113 AQUATIC THERAPY/EXERCISES: CPT

## 2018-12-11 NOTE — PROGRESS NOTES
Physical Therapy Daily Treatment Note     Name: Raffy Rutherford Jr.  Clinic Number: [id number]    Therapy Diagnosis:   Encounter Diagnosis   Name Primary?    Lumbar back pain with radiculopathy affecting lower extremity      Physician: Jason Caldwell MD    Visit Date: 12/11/2018  Physician Orders: PT Eval and Treat Low back pain w/ bilat radiculopathy  Medical Diagnosis from Referral: SI joint dysfunction, s/p lumbar fusion, s/p R SHARAD, polyneuropathy  Evaluation Date: 11/15/2018  Authorization Period Expiration: 12/31/18  Plan of Care Expiration: 2/15/19  Visit # / Visits authorized: 7/ 12    Time In: 1000  Time Out: 1100  Total Billable Time: 60 minutes    Precautions: Standard    Subjective     Pt reports: no increased pain back after yesterday's session  He was compliant with home exercise program.  Response to previous treatment: No change in back pain  Functional change: using B AFO less with mobility    Pain: 3/10  Location: right back      Objective     Raffy received aquatic therapeutic exercises to develop strength, endurance, ROM, flexibility, posture and core stabilization for 60 minutes including:    WARMUP  Walking fwd/back/side x 2 laps    STRETCHES  HS  GSS  Piriformis- R only    LE EX  Heel raise with GS x30  Fwd step up x 30x  Lateral steps up x 30 with intermittent UE support  Step down with UE support x 30  Hip hikes x 30  Walking lunges in // bars x 4 laps(VC for push off)  Hip abd/add x 30- np  Abduction walks in mini squat stance with B hip IR x 2 lap-np  Monster walks x 2 laps-np    Ankle stabilization:  Heel walks in // bars 2 laps -np  Toe walks in // bars with UE support 2 laps-np  Static standing in tandem stance 2x30 sec with each LE in the posterior position np  Tandem walks in // bars with occasional UE support 2 laps np  Marching with 3 sec holds 2 laps np  Mini squat hold with chest press of disc  Orange tb for ankle pf/eversion/inversion x 15 reps    Core: NP  Shoulder abd/add in  modified tandem with apc 30x  Shoulder flx/ext in modified tandem with apc 30x  Shoulder extensions with 1 DB submersion just below surface in long lever arm position 10x 10 sec each  Wall sits w/ aquatic 1 aquatic weights in long lever arm position 3x30 sec  Static standing in mini squat position with blue disc submersion chest press 2x10    ENDURANCE- np    COOL DOWN  1 lap fwd/back/sideways      Reassessment of Raffy: intact LT BLE with exception decreased LT B feet. 3+/5 L ankle pf/df, 3/5 R ankle pf/df; 4/5 B hip abd/add, 4/5 B knee flex/ext; poor proprioception B feet; Not currently using assistive device for gait but decreased step length; Lacks heel strike & toe off with gait;  SLS L 30 seconds  SLS R 45 seconds    Home Exercises Provided and Patient Education Provided     Education provided:   - knee to chest stretches & truncal rotations(HEP2Go [id number])    Written Home Exercises Provided: yes.  Exercises were reviewed and Raffy was able to demonstrate them prior to the end of the session.  Raffy demonstrated good  understanding of the education provided.     See EMR under Media for exercises provided 12/11/2018.    Assessment     Improvement with knee/hip/core strength but still has signiifcant weakness B feet/ankles causing gait deviations & making patient high risk for falls. Raffy is highly motivated & participatory with PT treatment &  adherent to  HEP  Raffy is progressing well towards his goals.   Pt prognosis is Good.     Pt will continue to benefit from skilled aquatic outpatient physical therapy to address the deficits listed in the problem list box on initial evaluation, provide pt/family education and to maximize pt's level of independence in the home and community environment.     Pt's spiritual, cultural and educational needs considered and pt agreeable to plan of care and goals.    Anticipated barriers to physical therapy: none    Goals:  Short Term Goals: 4 weeks   1. Pt will be able to  tolerate 20 minutes of driving reporting no more than 10% increase in back pain/ radicular sx  2. Pt will be able to ascend/descend 12 steps with one railing reporting no increase in pain in order to be able to navigate to work  3. Pt will be independent with HEP and report compliance at least 5 days/week     Long Term Goals: 8 weeks   1. Pt will demonstrate B foot MMT of at least 4/5 throughout to demonstrate improved functional mobility  2. 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 Pt will demonstrate independent gait with minimal gait deviations  Plan     Plan of care Certification: 11/15/2018 to 2/15/19.     Outpatient Physical Therapy 2 times weekly for 12 weeks to include the following interventions: Dry Needling, Aquatic Therapy, Cervical/Lumbar Traction, Electrical Stimulation TENS, Gait Training, Manual Therapy, Moist Heat/ Ice, Neuromuscular Re-ed, Orthotic Management and Training, Patient Education, Self Care, Therapeutic Activites, Therapeutic Exercise and Ultrasound.     Antonia Ca, PT

## 2018-12-11 NOTE — PLAN OF CARE
Physical Therapy Daily Treatment Note     Name: Raffy Rutherford Jr.  Clinic Number: [id number]    Therapy Diagnosis:   Encounter Diagnosis   Name Primary?    Lumbar back pain with radiculopathy affecting lower extremity      Physician: Jason Caldwell MD    Visit Date: 12/11/2018  Physician Orders: PT Eval and Treat Low back pain w/ bilat radiculopathy  Medical Diagnosis from Referral: SI joint dysfunction, s/p lumbar fusion, s/p R SHARAD, polyneuropathy  Evaluation Date: 11/15/2018  Authorization Period Expiration: 12/31/18  Plan of Care Expiration: 2/15/19  Visit # / Visits authorized: 7/ 12    Time In: 1000  Time Out: 1100  Total Billable Time: 60 minutes    Precautions: Standard    Subjective     Pt reports: no increased pain back after yesterday's session  He was compliant with home exercise program.  Response to previous treatment: No change in back pain  Functional change: using B AFO less with mobility    Pain: 3/10  Location: right back      Objective     Raffy received aquatic therapeutic exercises to develop strength, endurance, ROM, flexibility, posture and core stabilization for 60 minutes including:    WARMUP  Walking fwd/back/side x 2 laps    STRETCHES  HS  GSS  Piriformis- R only    LE EX  Heel raise with GS x30  Fwd step up x 30x  Lateral steps up x 30 with intermittent UE support  Step down with UE support x 30  Hip hikes x 30  Walking lunges in // bars x 4 laps(VC for push off)  Hip abd/add x 30- np  Abduction walks in mini squat stance with B hip IR x 2 lap-np  Monster walks x 2 laps-np    Ankle stabilization:  Heel walks in // bars 2 laps -np  Toe walks in // bars with UE support 2 laps-np  Static standing in tandem stance 2x30 sec with each LE in the posterior position np  Tandem walks in // bars with occasional UE support 2 laps np  Marching with 3 sec holds 2 laps np  Mini squat hold with chest press of disc  Orange tb for ankle pf/eversion/inversion x 15 reps    Core: NP  Shoulder abd/add in  modified tandem with apc 30x  Shoulder flx/ext in modified tandem with apc 30x  Shoulder extensions with 1 DB submersion just below surface in long lever arm position 10x 10 sec each  Wall sits w/ aquatic 1 aquatic weights in long lever arm position 3x30 sec  Static standing in mini squat position with blue disc submersion chest press 2x10    ENDURANCE- np    COOL DOWN  1 lap fwd/back/sideways      Reassessment of Raffy: intact LT BLE with exception decreased LT B feet. 3+/5 L ankle pf/df, 3/5 R ankle pf/df; 4/5 B hip abd/add, 4/5 B knee flex/ext; poor proprioception B feet; Not currently using assistive device for gait but decreased step length; Lacks heel strike & toe off with gait;  SLS L 30 seconds  SLS R 45 seconds    Home Exercises Provided and Patient Education Provided     Education provided:   - knee to chest stretches & truncal rotations(HEP2Go [id number])    Written Home Exercises Provided: yes.  Exercises were reviewed and Raffy was able to demonstrate them prior to the end of the session.  Raffy demonstrated good  understanding of the education provided.     See EMR under Media for exercises provided 12/11/2018.    Assessment     Improvement with knee/hip/core strength but still has signiifcant weakness B feet/ankles causing gait deviations & making patient high risk for falls. Raffy is highly motivated & participatory with PT treatment &  adherent to  HEP  Raffy is progressing well towards his goals.   Pt prognosis is Good.     Pt will continue to benefit from skilled aquatic outpatient physical therapy to address the deficits listed in the problem list box on initial evaluation, provide pt/family education and to maximize pt's level of independence in the home and community environment.     Pt's spiritual, cultural and educational needs considered and pt agreeable to plan of care and goals.    Anticipated barriers to physical therapy: none    Goals:  Short Term Goals: 4 weeks   1. Pt will be able to  tolerate 20 minutes of driving reporting no more than 10% increase in back pain/ radicular sx  2. Pt will be able to ascend/descend 12 steps with one railing reporting no increase in pain in order to be able to navigate to work  3. Pt will be independent with HEP and report compliance at least 5 days/week     Long Term Goals: 8 weeks   1. Pt will demonstrate B foot MMT of at least 4/5 throughout to demonstrate improved functional mobility  2. 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 Pt will demonstrate independent gait with minimal gait deviations  Plan     Plan of care Certification: 11/15/2018 to 2/15/19.     Outpatient Physical Therapy 2 times weekly for 12 weeks to include the following interventions: Dry Needling, Aquatic Therapy, Cervical/Lumbar Traction, Electrical Stimulation TENS, Gait Training, Manual Therapy, Moist Heat/ Ice, Neuromuscular Re-ed, Orthotic Management and Training, Patient Education, Self Care, Therapeutic Activites, Therapeutic Exercise and Ultrasound.     Antonia Ca, PT

## 2018-12-12 ENCOUNTER — CLINICAL SUPPORT (OUTPATIENT)
Dept: AUDIOLOGY | Facility: CLINIC | Age: 68
End: 2018-12-12

## 2018-12-12 DIAGNOSIS — H90.3 SENSORINEURAL HEARING LOSS, BILATERAL: Primary | ICD-10-CM

## 2018-12-12 PROCEDURE — 99499 UNLISTED E&M SERVICE: CPT | Mod: S$GLB,,, | Performed by: AUDIOLOGIST

## 2018-12-12 NOTE — PROGRESS NOTES
Patient seen for HAFU. Adjustments were made to [facility] programs. We did some aided testing in the [place]-the patient was counseled on the limitation of aided testing with noise cancellation hearing aid technology, a monaural hearing aid and at certain frequencies. Testing was completed per the patient's request. He will return after his trip to [place]. He will call to set up this appointment. At that time, he would like to try binaural hearing aids.

## 2018-12-17 ENCOUNTER — HOSPITAL ENCOUNTER (EMERGENCY)
Facility: HOSPITAL | Age: 68
Discharge: HOME | End: 2018-12-17
Attending: EMERGENCY MEDICINE
Payer: COMMERCIAL

## 2018-12-17 VITALS
RESPIRATION RATE: 20 BRPM | BODY MASS INDEX: 25.76 KG/M2 | TEMPERATURE: 99 F | SYSTOLIC BLOOD PRESSURE: 109 MMHG | OXYGEN SATURATION: 95 % | WEIGHT: 170 LBS | DIASTOLIC BLOOD PRESSURE: 70 MMHG | HEIGHT: 68 IN | HEART RATE: 90 BPM

## 2018-12-17 DIAGNOSIS — R11.2 NON-INTRACTABLE VOMITING WITH NAUSEA, UNSPECIFIED VOMITING TYPE: ICD-10-CM

## 2018-12-17 DIAGNOSIS — R19.7 DIARRHEA, UNSPECIFIED TYPE: ICD-10-CM

## 2018-12-17 DIAGNOSIS — K52.9 ENTERITIS: Primary | ICD-10-CM

## 2018-12-17 LAB
ALBUMIN SERPL BCP-MCNC: 4.5 G/DL
ALP SERPL-CCNC: 44 U/L
ALT SERPL W/O P-5'-P-CCNC: 37 U/L
ANION GAP SERPL CALC-SCNC: 11 MMOL/L
AST SERPL-CCNC: 28 U/L
BASOPHILS # BLD AUTO: 0.02 K/UL
BASOPHILS NFR BLD: 0.2 %
BILIRUB SERPL-MCNC: 1 MG/DL
BILIRUB UR QL STRIP: NEGATIVE
BUN SERPL-MCNC: 19 MG/DL
CALCIUM SERPL-MCNC: 9.7 MG/DL
CHLORIDE SERPL-SCNC: 98 MMOL/L
CLARITY UR REFRACT.AUTO: ABNORMAL
CO2 SERPL-SCNC: 27 MMOL/L
COLOR UR AUTO: YELLOW
CREAT SERPL-MCNC: 0.8 MG/DL
DIFFERENTIAL METHOD: ABNORMAL
EOSINOPHIL # BLD AUTO: 0 K/UL
EOSINOPHIL NFR BLD: 0 %
ERYTHROCYTE [DISTWIDTH] IN BLOOD BY AUTOMATED COUNT: 12.3 %
EST. GFR  (AFRICAN AMERICAN): >60 ML/MIN/1.73 M^2
EST. GFR  (NON AFRICAN AMERICAN): >60 ML/MIN/1.73 M^2
GLUCOSE SERPL-MCNC: 138 MG/DL
GLUCOSE UR QL STRIP: NEGATIVE
HCT VFR BLD AUTO: 50.6 %
HGB BLD-MCNC: 16.8 G/DL
HGB UR QL STRIP: ABNORMAL
IMM GRANULOCYTES # BLD AUTO: 0.09 K/UL
IMM GRANULOCYTES NFR BLD AUTO: 0.8 %
KETONES UR QL STRIP: ABNORMAL
LEUKOCYTE ESTERASE UR QL STRIP: NEGATIVE
LIPASE SERPL-CCNC: 15 U/L
LYMPHOCYTES # BLD AUTO: 0.3 K/UL
LYMPHOCYTES NFR BLD: 2.9 %
MCH RBC QN AUTO: 31.6 PG
MCHC RBC AUTO-ENTMCNC: 33.2 G/DL
MCV RBC AUTO: 95 FL
MICROSCOPIC COMMENT: ABNORMAL
MONOCYTES # BLD AUTO: 0.5 K/UL
MONOCYTES NFR BLD: 4.5 %
NEUTROPHILS # BLD AUTO: 10.3 K/UL
NEUTROPHILS NFR BLD: 91.6 %
NITRITE UR QL STRIP: NEGATIVE
NRBC BLD-RTO: 0 /100 WBC
PH UR STRIP: 5 [PH] (ref 5–8)
PLATELET # BLD AUTO: 236 K/UL
PMV BLD AUTO: 9.1 FL
POTASSIUM SERPL-SCNC: 4 MMOL/L
PROT SERPL-MCNC: 8.3 G/DL
PROT UR QL STRIP: NEGATIVE
RBC # BLD AUTO: 5.31 M/UL
RBC #/AREA URNS AUTO: 5 /HPF (ref 0–4)
SODIUM SERPL-SCNC: 136 MMOL/L
SP GR UR STRIP: 1.02 (ref 1–1.03)
SQUAMOUS #/AREA URNS AUTO: 1 /HPF
URN SPEC COLLECT METH UR: ABNORMAL
WBC # BLD AUTO: 11.2 K/UL
WBC #/AREA URNS AUTO: 3 /HPF (ref 0–5)

## 2018-12-17 PROCEDURE — 63600175 PHARM REV CODE 636 W HCPCS: Performed by: EMERGENCY MEDICINE

## 2018-12-17 PROCEDURE — 25500020 PHARM REV CODE 255: Performed by: EMERGENCY MEDICINE

## 2018-12-17 PROCEDURE — 96361 HYDRATE IV INFUSION ADD-ON: CPT

## 2018-12-17 PROCEDURE — 83690 ASSAY OF LIPASE: CPT

## 2018-12-17 PROCEDURE — 85025 COMPLETE CBC W/AUTO DIFF WBC: CPT

## 2018-12-17 PROCEDURE — 80053 COMPREHEN METABOLIC PANEL: CPT

## 2018-12-17 PROCEDURE — 99284 EMERGENCY DEPT VISIT MOD MDM: CPT | Mod: ,,, | Performed by: NURSE PRACTITIONER

## 2018-12-17 PROCEDURE — 25000003 PHARM REV CODE 250: Performed by: EMERGENCY MEDICINE

## 2018-12-17 PROCEDURE — 96374 THER/PROPH/DIAG INJ IV PUSH: CPT | Mod: 59

## 2018-12-17 PROCEDURE — 81001 URINALYSIS AUTO W/SCOPE: CPT

## 2018-12-17 PROCEDURE — 99284 EMERGENCY DEPT VISIT MOD MDM: CPT | Mod: 25

## 2018-12-17 RX ORDER — ONDANSETRON 4 MG/1
4 TABLET, FILM COATED ORAL EVERY 6 HOURS
Qty: 12 TABLET | Refills: 0 | Status: SHIPPED | OUTPATIENT
Start: 2018-12-17 | End: 2018-12-20

## 2018-12-17 RX ORDER — ONDANSETRON 2 MG/ML
4 INJECTION INTRAMUSCULAR; INTRAVENOUS
Status: COMPLETED | OUTPATIENT
Start: 2018-12-17 | End: 2018-12-17

## 2018-12-17 RX ADMIN — SODIUM CHLORIDE 1000 ML: 0.9 INJECTION, SOLUTION INTRAVENOUS at 07:12

## 2018-12-17 RX ADMIN — IOHEXOL 75 ML: 350 INJECTION, SOLUTION INTRAVENOUS at 10:12

## 2018-12-17 RX ADMIN — ONDANSETRON 4 MG: 2 INJECTION INTRAMUSCULAR; INTRAVENOUS at 07:12

## 2018-12-17 NOTE — ED NOTES
All medications, prescriptions, paperwork, and discharge information instructed and handed to patient, all questions answered and patient verbalized understanding. Patient AAOx4, VSS, no acute distress reported or observed.   All LDAs removed, intact, site WNL, no redness or edema observed. Patient dressed himself  and ambulated to ED waiting area independently,  with all belongings.

## 2018-12-17 NOTE — ED NOTES
Assumed care, received report from Karlie Mayo RN. Patient is in bed awake, alert, and oriented x 4, cooperative, VSS, airway open and patent, respirations spontaneous, even and unlabored, with normal rate and rhythm, skin warm, CDI, moves all extremities well, no weakness or edema observed. Last episode of diarrhea 20 minutes ago, still c/o slight nausea, but no vomiting since arrival. L AC 18g PIV, intact, patent, flushes well, with good blood return, and currently saline locked, site is WNL, no erythema, pain or edema to site, dressing is CDI, no pain or apparent distress reported or observed. Patient able to reposition independently. Urinal at bedside. Updated patient on current status, discussed care plan, patient denies any additional needs at this time and has no complaints or questions. Room assessed for safety measures and cleanliness, no action needed at this time. The bed is in low, locked position with side rails up x 2. Personal belongings and call light in reach. Patient is oriented to call light use. Patient verbalizes will call nurse if assistance is needed. Will continue to monitor.

## 2018-12-17 NOTE — ED TRIAGE NOTES
Presents to ER with complaint of nausea, vomiting and diarrhea since yesterday.  Complaining of generalized abdominal pain.  Patient's name and date of birth checked and is correct.    LOC: The patient is awake, alert, and oriented to place, time, situation. Affect is appropriate.  Speech is appropriate and clear.      APPEARANCE: Patient resting comfortably, reporting palpation, light headedness,  in no acute distress.  Patient is clean and well groomed.     SKIN: The skin is warm and dry; color consistent with ethnicity.  Patient has normal skin turgor and moist mucus membranes.  Skin intact; no breakdown or bruising noted.      MUSCULOSKELETAL: Patient moving upper and lower extremities without difficulty.  Denies weakness.      RESPIRATORY: Airway is open and patent. Respirations spontaneous, even, easy, and non-labored.  Patient has a normal effort and rate.  No accessory muscle use noted. Denies cough.  BS clear.     CARDIAC:  No peripheral edema noted. No complaints of chest pain.       ABDOMEN: Soft and non tender to palpation.  No distention noted.      NEUROLOGIC: Eyes open spontaneously.  Behavior appropriate to situation.  Follows commands; facial expression symmetrical.  Purposeful motor response noted; normal sensation in all extremities.

## 2018-12-17 NOTE — ED PROVIDER NOTES
Encounter Date: 12/17/2018       History     Chief Complaint   Patient presents with    Emesis     Pt reports over a dozen episodes of vomiting since last night, started having diarrhea this AM. Pt also reports muscle cramps in legs.    Diarrhea     Pt is a 67 yo male with medical history of diverticulitis, GERD, hepatitis A presenting to the ED for nausea, vomiting, diarrhea and intermittent chills since last night.  Pt denies eating anything new.  Pt states pain in LUQ.  Pt denies any fever, chest pain or shortness of breath.            Review of patient's allergies indicates:   Allergen Reactions    Alphagan [brimonidine]      Patient taking MASO-B Selective Inhibitor Selegiline (Emsam)    Coumadin [warfarin]      itch    Oxycodone      hiccups     Past Medical History:   Diagnosis Date    Adenomatous polyp 2003    Adenomatous polyp     Allergy     Arthritis     Back pain     after trauma beginning in 195    Cataract     Chronic pain     neck and left shoulder    Cluster headache 2013    Colon polyp     Degenerative disc disease     Depression     Diverticulitis 12/2013    Elevated PSA     Fibromyalgia 2013    GERD (gastroesophageal reflux disease)     Hepatitis 1970's    A    History of prostate biopsy 2002    Hyperlipidemia     Joint pain     Sleep apnea     Special screening for malignant neoplasms, colon 5/5/2014    Thyroid nodule 7/16/2014    Visual disturbance 2012    problems after cataract surgery     Past Surgical History:   Procedure Laterality Date    BACK SURGERY      BLEPHAROPLASTY UPPER LID Bilateral 2/10/2015    Performed by Rhett Lainez III, MD at [facility] OR 2ND FLR    BLOCK, GANGLION IMPAR N/A 6/25/2013    Performed by [facility]aceli [facility] at [facility] PAIN T    CATARACT EXTRACTION W/  INTRAOCULAR LENS IMPLANT Bilateral     CHOLECYSTECTOMY      COLONOSCOPY N/A 5/5/2014    Performed by Pedro Carlos MD at [facility] ENDO (4TH FLR)    COSMETIC SURGERY  2/10/2015     Direct mid-forehead brow lift    COSMETIC SURGERY  2/10/2015    Bilateral upper lid blepahroplasty    ESOPHAGOGASTRODUODENOSCOPY (EGD) N/A 4/28/2015    Performed by Amadou Hardin MD at [facility] ENDO (4TH FLR)    EYE SURGERY      FUSION EXTREME LATERAL N/A 6/22/2015    Performed by Milad Browne MD at [facility] OR 2ND FLR    HEMORRHOID SURGERY      with complication of chronic bleeding for 6 weeks     INJECTION OF STEROID Right 12/6/2018    Procedure: INJECTION, STEROID Right SI Joint Block and Steroid Injection;  Surgeon: Jason Caldwell MD;  Location: [facility] OR;  Service: Neurosurgery;  Laterality: Right;  Procedure: Right SI Joint Block and Steroid Injection  Surgery Time: 30 MIN  LOS: 0  Anesthesia: MAC  Radiology: C-arm  Bed: [place] [id number] Poster  Position: Prone    INJECTION, STEROID Right SI Joint Block and Steroid Injection Right 12/6/2018    Performed by Jason Caldwell MD at [facility] OR    JOINT REPLACEMENT      KNEE SURGERY      involving arthroscopic surgery to both knees    LIFT-BROW midforehead direct Bilateral 2/10/2015    Performed by Rhett Lainez III, MD at [facility] OR 2ND FLR    SINUS SURGERY      SINUS SURGERY      left molar and sinus surgery for trigeminal neuralgia    SPINAL FUSION  6/22/2015    L3-L5 XLIF    SPINE SURGERY  6/22/15    XLIF/TANA    TOTAL HIP ARTHROPLASTY  4/2012    Pt states he had total hip replacement on his left hip.     Family History   Problem Relation Age of Onset    Cancer Mother         colon; uterine    Nephrolithiasis Mother     Stroke Mother 86    Pancreatitis Brother     Vision loss Brother         macular degeneration    Diabetes Brother     Macular degeneration Brother     Migraines Sister     No Known Problems Father     No Known Problems Maternal Grandmother     No Known Problems Maternal Grandfather     No Known Problems Paternal Grandmother     No Known Problems Paternal Grandfather     No Known Problems Sister     No Known Problems Maternal  Aunt     No Known Problems Maternal Uncle     No Known Problems Paternal Aunt     No Known Problems Paternal Uncle     Melanoma Neg Hx     Amblyopia Neg Hx     Blindness Neg Hx     Cataracts Neg Hx     Glaucoma Neg Hx     Hypertension Neg Hx     Retinal detachment Neg Hx     Strabismus Neg Hx     Thyroid disease Neg Hx     Allergic rhinitis Neg Hx     Allergies Neg Hx     Angioedema Neg Hx     Asthma Neg Hx     Eczema Neg Hx     Urticaria Neg Hx     Rhinitis Neg Hx     Immunodeficiency Neg Hx     Atopy Neg Hx      Social History     Tobacco Use    Smoking status: Never Smoker    Smokeless tobacco: Never Used   Substance Use Topics    Alcohol use: Yes     Comment: occasion    Drug use: No     Review of Systems   Constitutional: Positive for appetite change and chills. Negative for fatigue and fever.   HENT: Positive for ear pain. Negative for sore throat.    Respiratory: Negative for cough and shortness of breath.    Cardiovascular: Negative for chest pain.   Gastrointestinal: Positive for abdominal pain, diarrhea, nausea and vomiting. Negative for constipation.   Genitourinary: Negative for decreased urine volume, difficulty urinating, dysuria and urgency.   Musculoskeletal: Negative for arthralgias, back pain and myalgias.   Skin: Negative for rash.   Neurological: Negative for dizziness, syncope, weakness, numbness and headaches.       Physical Exam     Initial Vitals [12/17/18 0628]   BP Pulse Resp Temp SpO2   127/79 83 18 97.9 °F (36.6 °C) 95 %      MAP       --         Physical Exam    Nursing note and vitals reviewed.  Constitutional: Vital signs are normal. He appears well-developed and well-nourished. He is cooperative.   HENT:   Head: Normocephalic and atraumatic.   Right Ear: Tympanic membrane and external ear normal.   Left Ear: Tympanic membrane and external ear normal.   Mouth/Throat: Uvula is midline. Posterior oropharyngeal erythema present.   Neck: Trachea normal and  phonation normal.   Cardiovascular: Normal rate and regular rhythm.   Pulses:       Radial pulses are 2+ on the right side, and 2+ on the left side.        Dorsalis pedis pulses are 2+ on the right side, and 2+ on the left side.   Pulmonary/Chest: Effort normal and breath sounds normal.   Abdominal: Soft. Normal appearance and bowel sounds are normal. He exhibits no distension. There is tenderness in the left upper quadrant. There is no rigidity, no rebound and no guarding.   Musculoskeletal: Normal range of motion.   Neurological: He is alert and oriented to person, place, and time. He has normal strength. GCS eye subscore is 4. GCS verbal subscore is 5. GCS motor subscore is 6.   Skin: Skin is warm, dry and intact. Capillary refill takes less than 2 seconds. No abrasion, no laceration and no rash noted. No cyanosis. Nails show no clubbing.         ED Course   Procedures  Labs Reviewed   CBC W/ AUTO DIFFERENTIAL - Abnormal; Notable for the following components:       Result Value    MCH 31.6 (*)     MPV 9.1 (*)     Immature Granulocytes 0.8 (*)     Gran # (ANC) 10.3 (*)     Immature Grans (Abs) 0.09 (*)     Lymph # 0.3 (*)     Gran% 91.6 (*)     Lymph% 2.9 (*)     All other components within normal limits   COMPREHENSIVE METABOLIC PANEL - Abnormal; Notable for the following components:    Glucose 138 (*)     Alkaline Phosphatase 44 (*)     All other components within normal limits   URINALYSIS, REFLEX TO URINE CULTURE - Abnormal; Notable for the following components:    Appearance, UA Hazy (*)     Ketones, UA 1+ (*)     Occult Blood UA 1+ (*)     All other components within normal limits    Narrative:     Preferred Collection Type->Urine, Clean Catch  YELLOW & GRAY TOPS   URINALYSIS MICROSCOPIC - Abnormal; Notable for the following components:    RBC, UA 5 (*)     All other components within normal limits    Narrative:     Preferred Collection Type->Urine, Clean Catch  YELLOW & GRAY TOPS   LIPASE          Imaging  Results          CT Abdomen Pelvis With Contrast (Final result)  Result time 12/17/18 11:31:58    Final result by Pedro Bush MD (12/17/18 11:31:58)                 Impression:      Small bowel thickening and fluid which can be seen in enteritis.  Recommend clinical correlation.  No evidence of perforation or obstruction.    Coronary atherosclerosis.    Bilateral renal hypodensities too small to definitively characterize; possible cysts.    Small fluid containing hiatal hernia.    Bibasilar dependent atelectasis with stable right hemidiaphragm elevation.    Additional findings as above.    Electronically signed by resident: Sixto Cook  Date:    12/17/2018  Time:    10:58    Electronically signed by: Pedro Bush MD  Date:    12/17/2018  Time:    11:31             Narrative:    EXAMINATION:  CT ABDOMEN PELVIS WITH CONTRAST    CLINICAL HISTORY:  Nausea, vomiting, diarrhea;    TECHNIQUE:  Low dose axial images, sagittal and coronal reformations were obtained from the lung bases to the pubic symphysis following the IV administration of 75 mL of Omnipaque 350 .  Oral contrast was not given.    COMPARISON:  CT abdomen/pelvis without contrast 04/11/2017    FINDINGS:  ABDOMEN/LOWER THORAX:    - Visualized heart: No cardiomegaly. No significant pericardial effusion. Coronary artery atherosclerotic calcification.    - Lung bases/pleura: No infiltrates and no nodules.  Mild bibasilar dependent atelectasis (right greater than left) with stable elevation of the right hemidiaphragm.    - Liver: Normal in size and attenuation.  No focal mass.    - Gallbladder: Surgically absent.    - Bile Ducts: No intrahepatic biliary ductal dilatation.  Mild stable common bile duct dilatation measuring up to 1.0 cm likely secondary to status post cholecystectomy.    - Spleen: Normal in size without focal lesion.    - Kidneys: Normal morphology and enhancement.  Few bilateral focal hypodensities that are too small to  definitively characterize, possible cysts.  No mass or hydronephrosis.  Visualized portions of the ureters and bladder are within normal limits.    - Adrenals: Normal.    - Pancreas: No mass or peripancreatic fat stranding.    - Retroperitoneum:  No significant adenopathy.    - Vascular: No abdominal aortic aneurysm or significant atherosclerotic calcification.  Branch vessels of the abdominal aorta are patent.  Portal venous system is patent.    - Abdominal wall: Within normal limits.    PELVIS:    Partially obscured secondary to left hip hardware.  No pelvic mass, adenopathy, or free fluid.  Mild prostatomegaly and dystrophic internal calcification.    GI/MESENTERY/PERITONEUM:    The stomach is partially distended with fluid without evidence of obstruction.  There is a small fluid containing hiatal hernia.  There is wall thickening in a few jejunal loops, as well as scattered fluid throughout the small bowel and right colon.  There is no convincing evidence of pneumatosis intestinalis.  There is diverticulosis coli without evidence of diverticulitis.  There is no evidence of bowel obstruction.  The appendix appears within normal limits.  No free air or ascites.    BONES: No acute fracture or aggressive osseous lesions. Postoperative change of lumbar fusion and remote thoracic vertebral compression fracture status post kyphoplasty.  The left hip total arthroplasty.  Remote right rib fracture.                                       APC / Resident Notes:   Emergent evaluation of a 69 yo male patient presenting to the ER with chief complaint of left upper quadrant abdominal pain, nausea, vomiting and diarrhea since last night.  Patient states symptoms started approximately 930 last night.  On exam patient A&O x3.  Cap refill greater than 3 sec.  Abdomen is soft with tenderness noted to left upper quadrant.  Bowel sounds within normal limits.  Breath sounds clear bilaterally.  I will get labs, imaging, hydrate, medicate  and reassess.  Differential diagnoses include but are not limited to : gastritis, gastroenteritis, pancreatitis, peptic ulcer disease, colitis,kidney stones, diverticulitis, musculoskeletal pain, others.  I discussed the care of this patient with my Supervising Physician.      Patient's CBC unremarkable. WBC 11.2 with an H&H of 16.8 and 50.6.  CMP unremarkable. Lipase negative at 15.  UA does show hazy appearance with 1+ blood.  Patient denies any urinary complaints.  Patient's CT negative for any acute infectious process.  Patient reassessed.  Patient states feeling better after IV fluids and Zofran.  Patient updated on lab and imaging results.  All questions and concerns addressed.    Patient is hemodynamically stable, vital signs are normal. Discharge instructions given. Prescription for Zofran given and explained. Return to ED precautions discussed. Follow up as directed. Pt verbalized understanding of this plan. Pt is stable for discharge.            Attending Attestation:     Physician Attestation Statement for NP/PA:   I discussed this assessment and plan of this patient with the NP/PA, but I did not personally examine the patient. The face to face encounter was performed by the NP/PA.    Other NP/PA Attestation Additions:    History of Present Illness: N/v/d                      Clinical Impression:   The primary encounter diagnosis was Enteritis. Diagnoses of Diarrhea, unspecified type and Non-intractable vomiting with nausea, unspecified vomiting type were also pertinent to this visit.      Disposition:   Disposition: Discharged  Condition: Stable                        Meeta Banerjee NP  12/17/18 1211       Carroll Shaver III, MD  12/17/18 1218

## 2018-12-17 NOTE — DISCHARGE INSTRUCTIONS
Your labs today in the ER were unremarkable.  Your CT scan shows     Our goal in the emergency department is to always give you outstanding care and exceptional service. You may receive a survey by mail or e-mail in the next week regarding your experience in our ED. We would greatly appreciate your completing and returning the survey. Your feedback provides us with a way to recognize our staff who give very good care and it helps us learn how to improve when your experience was below our aspiration of excellence.

## 2018-12-18 ENCOUNTER — OFFICE VISIT (OUTPATIENT)
Dept: NEUROSURGERY | Facility: CLINIC | Age: 68
End: 2018-12-18
Payer: COMMERCIAL

## 2018-12-18 VITALS
WEIGHT: 170 LBS | SYSTOLIC BLOOD PRESSURE: 114 MMHG | HEIGHT: 68 IN | HEART RATE: 69 BPM | DIASTOLIC BLOOD PRESSURE: 76 MMHG | BODY MASS INDEX: 25.76 KG/M2

## 2018-12-18 DIAGNOSIS — Z98.1 S/P LUMBAR FUSION: ICD-10-CM

## 2018-12-18 DIAGNOSIS — M53.3 SACROILIAC JOINT DYSFUNCTION OF RIGHT SIDE: Primary | ICD-10-CM

## 2018-12-18 PROCEDURE — 99212 OFFICE O/P EST SF 10 MIN: CPT | Mod: S$GLB,,, | Performed by: NEUROLOGICAL SURGERY

## 2018-12-18 PROCEDURE — 99999 PR PBB SHADOW E&M-EST. PATIENT-LVL IV: CPT | Mod: PBBFAC,,, | Performed by: NEUROLOGICAL SURGERY

## 2018-12-18 RX ORDER — HYDROCODONE BITARTRATE AND ACETAMINOPHEN 5; 325 MG/1; MG/1
1 TABLET ORAL EVERY 6 HOURS PRN
Qty: 60 TABLET | Refills: 0 | Status: SHIPPED | OUTPATIENT
Start: 2018-12-18 | End: 2019-02-05 | Stop reason: SDUPTHER

## 2018-12-18 NOTE — PROGRESS NOTES
NEUROSURGICAL POST-OPERATIVE PROGRESS NOTE    DATE OF SERVICE:  12/18/2018      ATTENDING PHYSICIAN:  Jason Caldwell MD    SUBJECTIVE:    INTERIM HISTORY:    This is a very pleasant 68 y.o. y.o. male, who is status 2 weeks right SI joint block and steroid injections. Reports significant improvement in his back and leg pain, more than 50% at this time compared to before the injection. Right after the injection he had more than 75% pain relief for about 4 consecutive hours. He is doing water therapy and wears the SI joint belt. He uses a Kabooti for prolonged sitting.     Low Back Pain Scale  R Low Back-Pain Score: 3  R Low Back-Pain Intensity: Pain killers give moderate relief from pain  R Low Back-Pain Score: I need some help, but manage most of my personal care  Low Back-Lifting: I can only lift very light weights   Low Back-Walking: Pain prevents me walking more than 1 mile   Low Back-Sitting: Pain prevents me from sitting more than 1 hour   Low Back-Standing: I cannot stand for longer than 1 hour without increasing pain   Low Back-Sleeping: I have pain in bed but it does not prevent me from sleeping well   Low Back-Social Life: Pain has restricted my social life and I do not go out very often   Low Back-Traveling: I have extra pain while traveling but it does not compel me to seek alternate forms of travel   Low Back-Changing Degree of Pain: My pain seems to be getting better but improvement is slow         OBJECTIVE:    PHYSICAL EXAMINATION:   Vitals:    12/18/18 1447   BP: 114/76   Pulse: 69       Neurosurgery Physical Exam    Ortho Exam    Neurologic Exam    Wound has healed.    DIAGNOSTIC DATA:    NA    ASSESMENT:    This is a 68 y.o. male who is s/p 2 weeks right SI joint block and steroid injection with improved back and legs pain.     Problem List Items Addressed This Visit     None      Visit Diagnoses     Sacroiliac joint dysfunction of right side    -  Primary    S/P lumbar fusion               PLAN:    Continue water therapy  Refill [place]  FU in 2 months        Jason Caldwell MD  Pager: [phone]

## 2018-12-27 ENCOUNTER — CLINICAL SUPPORT (OUTPATIENT)
Dept: REHABILITATION | Facility: HOSPITAL | Age: 68
End: 2018-12-27
Attending: NEUROLOGICAL SURGERY
Payer: COMMERCIAL

## 2018-12-27 DIAGNOSIS — M54.16 LUMBAR BACK PAIN WITH RADICULOPATHY AFFECTING LOWER EXTREMITY: ICD-10-CM

## 2018-12-27 PROCEDURE — 97113 AQUATIC THERAPY/EXERCISES: CPT

## 2018-12-27 NOTE — PROGRESS NOTES
Physical Therapy Daily Treatment Note     Name: Raffy Rutherford Jr.  Clinic Number: [id number]    Therapy Diagnosis:   Encounter Diagnosis   Name Primary?    Lumbar back pain with radiculopathy affecting lower extremity      Physician: Jason Caldwell MD    Visit Date: 12/27/2018  Physician Orders: PT Eval and Treat Low back pain w/ bilat radiculopathy  Medical Diagnosis from Referral: SI joint dysfunction, s/p lumbar fusion, s/p R SHARAD, polyneuropathy  Evaluation Date: 11/15/2018  Authorization Period Expiration: 12/31/18  Plan of Care Expiration: 2/15/19  Visit # / Visits authorized: 7/ 12    Time In: 1000  Time Out: 1100  Total Billable Time: 60 minutes    Precautions: Standard    Subjective     Pt reports: no increased pain back after yesterday's session  He was compliant with home exercise program.  Response to previous treatment: No change in back pain  Functional change: using B AFO less with mobility    Pain: 3/10  Location: right back      Objective     Raffy received aquatic therapeutic exercises to develop strength, endurance, ROM, flexibility, posture and core stabilization for 60 minutes including:    WARMUP  Walking fwd/back/side x 2 laps    STRETCHES  HS  GSS  Piriformis- R only    LE EX  Heel raise with GS x30  Fwd step up x 30x  Lateral steps up x 30 with intermittent UE support  Step down with UE support x 30  Hip hikes x 30  Walking lunges in // bars x 4 laps(VC for push off)  Hip abd/add x 30- np  Abduction walks in mini squat stance with B hip IR x 2 lap  Monster walks x 2 laps-    Ankle stabilization:  Heel walks in // bars 2 laps   Toe walks in // bars with UE support 2 laps  Static standing in tandem stance 2x30 sec with each LE in the posterior position   Tandem walks in // bars with occasional UE support 2 laps   Marching with 3 sec holds 2 laps np  Mini squat hold with chest press of disc  Orange tb for ankle pf/eversion/inversion x 15 reps    Core:   Shoulder horizontal abd/add in modified  tandem with apc 30x  Shoulder flx/ext in modified tandem with apc 30x  Shoulder extensions with 1 DB submersion just below surface in long lever arm position 10x 10 sec each  Wall sits w/ aquatic 1 aquatic weights in long lever arm position 3x30 sec  Static standing in mini squat position with blue disc submersion chest press 2x10    ENDURANCE    COOL DOWN  1 lap fwd/back/sideways      Home Exercises Provided and Patient Education Provided     Education provided:   - knee to chest stretches & truncal rotations([id number])    Written Home Exercises Provided: yes.  Exercises were reviewed and Raffy was able to demonstrate them prior to the end of the session.  Raffy demonstrated good  understanding of the education provided.     See EMR under Media for exercises provided 12/11/2018.    Assessment     Improvement with knee/hip/core strength but still has signiifcant weakness B feet/ankles causing gait deviations & making patient high risk for falls. Raffy is highly motivated & participatory with PT treatment &  adherent to  HEP  Raffy is progressing well towards his goals.   Pt prognosis is Good.     Pt will continue to benefit from skilled aquatic outpatient physical therapy to address the deficits listed in the problem list box on initial evaluation, provide pt/family education and to maximize pt's level of independence in the home and community environment.     Pt's spiritual, cultural and educational needs considered and pt agreeable to plan of care and goals.    Anticipated barriers to physical therapy: none    Goals:  Short Term Goals: 4 weeks   1. Pt will be able to tolerate 20 minutes of driving reporting no more than 10% increase in back pain/ radicular sx  2. Pt will be able to ascend/descend 12 steps with one railing reporting no increase in pain in order to be able to navigate to work  3. Pt will be independent with HEP and report compliance at least 5 days/week     Long Term Goals: 8 weeks   1. Pt  will demonstrate B foot MMT of at least 4/5 throughout to demonstrate improved functional mobility  2. 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 Pt will demonstrate independent gait with minimal gait deviations  Plan     Plan of care Certification: 11/15/2018 to 2/15/19.     Outpatient Physical Therapy 2 times weekly for 12 weeks to include the following interventions: Dry Needling, Aquatic Therapy, Cervical/Lumbar Traction, Electrical Stimulation TENS, Gait Training, Manual Therapy, Moist Heat/ Ice, Neuromuscular Re-ed, Orthotic Management and Training, Patient Education, Self Care, Therapeutic Activites, Therapeutic Exercise and Ultrasound.     Antonia Ca, PT

## 2019-01-03 ENCOUNTER — CLINICAL SUPPORT (OUTPATIENT)
Dept: REHABILITATION | Facility: HOSPITAL | Age: 69
End: 2019-01-03
Attending: NEUROLOGICAL SURGERY
Payer: COMMERCIAL

## 2019-01-03 DIAGNOSIS — M54.16 LUMBAR BACK PAIN WITH RADICULOPATHY AFFECTING LOWER EXTREMITY: ICD-10-CM

## 2019-01-03 PROCEDURE — 97113 AQUATIC THERAPY/EXERCISES: CPT

## 2019-01-03 NOTE — PROGRESS NOTES
"Physical Therapy Daily Treatment Note     Name: Raffy Rutherford Jr.  Clinic Number: [id number]    Therapy Diagnosis:   Encounter Diagnosis   Name Primary?    Lumbar back pain with radiculopathy affecting lower extremity      Physician: Jason Caldwell MD    Visit Date: 1/3/2019  Physician Orders: PT Eval and Treat Low back pain w/ bilat radiculopathy  Medical Diagnosis from Referral: SI joint dysfunction, s/p lumbar fusion, s/p R SHARAD, polyneuropathy  Evaluation Date: 11/15/2018  Authorization Period Expiration: 12/31/18  Plan of Care Expiration: 2/15/19  Visit # / Visits authorized: 8/ 12    Time In: 100  Time Out: 200  Total Billable Time: 60 minutes    Precautions: Standard    Subjective     Pt reports: 2/10 LBP, states that he is having "tightness" in R groin area which is a new sx  He was compliant with home exercise program.  Response to previous treatment: No change in back pain  Functional change: using B AFO less with mobility    Pain: 3/10  Location: right back      Objective     Raffy received aquatic therapeutic exercises to develop strength, endurance, ROM, flexibility, posture and core stabilization for 60 minutes including:    WARMUP  Walking fwd/back/side x 2 laps    STRETCHES  HS  quad  Piriformis- R only    LE EX  Mini Squat with QS x30  Heel raise with GS x30  Fwd step up x 30x  Lateral steps up x 30 with intermittent UE support  Step down with UE support x 30  Hip hikes x 30  Walking lunges in // bars x 4 laps(VC for push off)  Hip abd/add x 30-   Abduction walks in mini squat stance with B hip IR x 2 lap  Monster walks x 2 laps-    Ankle stabilization:  Heel walks in // bars 2 laps   Toe walks in // bars with UE support 2 laps  Static standing in tandem stance 2x30 sec with each LE in the posterior position   Tandem walks in // bars with occasional UE support 2 laps   Marching with 3 sec holds 2 laps np  Mini squat hold with chest press of disc  Orange tb for ankle pf/eversion/inversion x 15 " reps    Core:   Shoulder horizontal abd/add in modified tandem with apc 30x  Shoulder flx/ext in modified tandem with apc 30x  Shoulder extensions with 1 DB submersion just below surface in long lever arm position 10x 10 sec each NP  Wall sits w/ aquatic 1 aquatic weights in long lever arm position 3x30 sec NP  Static standing in mini squat position with yellow ball submersion chest press 3x10    ENDURANCE  Marching x 3 min    COOL DOWN  1 lap fwd/back/sideways      Home Exercises Provided and Patient Education Provided     Education provided:   - knee to chest stretches & truncal rotations(HEP2Go [id number])    Written Home Exercises Provided: yes.  Exercises were reviewed and Raffy was able to demonstrate them prior to the end of the session.  Raffy demonstrated good  understanding of the education provided.     See EMR under Media for exercises provided 12/11/2018.    Assessment     Pt spencer tx with ther ex well, good form, posture, core stab tech with few VCs required.  Raffy is highly motivated & participatory with PT treatment &  adherent to  HEP  Raffy is progressing well towards his goals.   Pt prognosis is Good.     Pt will continue to benefit from skilled aquatic outpatient physical therapy to address the deficits listed in the problem list box on initial evaluation, provide pt/family education and to maximize pt's level of independence in the home and community environment.     Pt's spiritual, cultural and educational needs considered and pt agreeable to plan of care and goals.    Anticipated barriers to physical therapy: none    Goals:  Short Term Goals: 4 weeks   1. Pt will be able to tolerate 20 minutes of driving reporting no more than 10% increase in back pain/ radicular sx  2. Pt will be able to ascend/descend 12 steps with one railing reporting no increase in pain in order to be able to navigate to work  3. Pt will be independent with HEP and report compliance at least 5 days/week     Long Term  Goals: 8 weeks   1. Pt will demonstrate B foot MMT of at least 4/5 throughout to demonstrate improved functional mobility  2. 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 Pt will demonstrate independent gait with minimal gait deviations  Plan     Plan of care Certification: 11/15/2018 to 2/15/19.     Outpatient Physical Therapy 2 times weekly for 12 weeks to include the following interventions: Dry Needling, Aquatic Therapy, Cervical/Lumbar Traction, Electrical Stimulation TENS, Gait Training, Manual Therapy, Moist Heat/ Ice, Neuromuscular Re-ed, Orthotic Management and Training, Patient Education, Self Care, Therapeutic Activites, Therapeutic Exercise and Ultrasound.     Todd Bowser, PTA

## 2019-01-07 ENCOUNTER — CLINICAL SUPPORT (OUTPATIENT)
Dept: REHABILITATION | Facility: HOSPITAL | Age: 69
End: 2019-01-07
Attending: NEUROLOGICAL SURGERY
Payer: COMMERCIAL

## 2019-01-07 ENCOUNTER — TELEPHONE (OUTPATIENT)
Dept: AUDIOLOGY | Facility: CLINIC | Age: 69
End: 2019-01-07

## 2019-01-07 DIAGNOSIS — M54.16 LUMBAR BACK PAIN WITH RADICULOPATHY AFFECTING LOWER EXTREMITY: ICD-10-CM

## 2019-01-07 PROCEDURE — 97113 AQUATIC THERAPY/EXERCISES: CPT

## 2019-01-07 NOTE — PROGRESS NOTES
Physical Therapy Daily Treatment Note     Name: Raffy Rutherford Jr.  Clinic Number: [id number]    Therapy Diagnosis:   Encounter Diagnosis   Name Primary?    Lumbar back pain with radiculopathy affecting lower extremity      Physician: Jason Caldwell MD    Visit Date: 1/7/2019  Physician Orders: PT Eval and Treat Low back pain w/ bilat radiculopathy  Medical Diagnosis from Referral: SI joint dysfunction, s/p lumbar fusion, s/p R SHARAD, polyneuropathy  Evaluation Date: 11/15/2018  Authorization Period Expiration: 12/31/2019  Plan of Care Expiration: 2/15/19  Visit # / Visits authorized: 2/30    Time In:1100  Time Out: 1200  Total Billable Time: 30 minutes    Precautions: Standard    Subjective     Pt reports: 7/10 LBP, states that his back got worse over the weekend but did not do anything different than usual activities  He was compliant with home exercise program.  Response to previous treatment: No change in back pain  Functional change: using B AFO less with mobility    Pain: 7/10  Location: Back    Objective     Raffy received aquatic therapeutic exercises to develop strength, endurance, ROM, flexibility, posture and core stabilization for 60 minutes including:    WARMUP  Walking fwd/back/side x 2 laps    STRETCHES  HS  quad  Piriformis- R only    LE EX  Mini Squat with QS x30  Heel raise with GS x30  Single leg heel raise x10  Fwd step up x 30x  Lateral steps up x 30 with intermittent UE support  Step down with UE support x 30  Hip hikes x 30  Walking lunges in // bars x 4 laps(VC for push off)  Hip abd/add x 30-   Abduction walks in mini squat stance with B hip IR x 2 lap  Monster walks x 2 laps-    Ankle stabilization:  Heel walks in // bars 2 laps   Toe walks in // bars with UE support 2 laps  Static standing in // bars in tandem stance 2x45 sec with each LE in the posterior position   Tandem walks in // bars with occasional UE support 2 laps   Marching with 3 sec holds 2 laps   Mini squat hold with chest  press of disc x 20  Orange tb for ankle pf/eversion/inversion x 15 reps-np    Core:   Shoulder horizontal abd/add in modified tandem with apc 30x  Shoulder flx/ext in modified tandem with apc 30x  Shoulder extensions with 1 DB submersion just below surface in long lever arm position 7 x 20 sec each   Wall sits w/ aquatic 1 aquatic weights in long lever arm position 3x30 sec NP  Static standing in mini squat position with yellow ball submersion chest press 3x10 -np    ENDURANCE  Marching x 3 min    COOL DOWN  1 lap fwd/back/sideways      Home Exercises Provided and Patient Education Provided     Education provided:   - knee to chest stretches & truncal rotations(HEP2Go [id number])    Written Home Exercises Provided: yes.  Exercises were reviewed and Raffy was able to demonstrate them prior to the end of the session.  Raffy demonstrated good  understanding of the education provided.     See EMR under Media for exercises provided 12/11/2018. Added additional ex of towel scrunches & single leg stance against wall(1/7/2019)    Assessment     Pt spencer tx with ther ex well, good form, posture, core stab tech with few VCs required.  Raffy is highly motivated & participatory with PT treatment &  adherent to  HEP  Raffy is progressing well towards his goals.   Pt prognosis is Good.     Pt will continue to benefit from skilled aquatic outpatient physical therapy to address the deficits listed in the problem list box on initial evaluation, provide pt/family education and to maximize pt's level of independence in the home and community environment.     Pt's spiritual, cultural and educational needs considered and pt agreeable to plan of care and goals.    Anticipated barriers to physical therapy: none    Goals:  Short Term Goals: 4 weeks   1. Pt will be able to tolerate 20 minutes of driving reporting no more than 10% increase in back pain/ radicular sx(progresing- not met)  2. Pt will be able to ascend/descend 12 steps with one  railing reporting no increase in pain in order to be able to navigate to work(progressing - not met)  3. Pt will be independent with HEP and report compliance at least 5 days/week     Long Term Goals: 8 weeks   1. Pt will demonstrate B foot MMT of at least 4/5 throughout to demonstrate improved functional mobility(progressing - not met)  2. Pt will be able to tolerate 1 hour of standing reporting no increase in sx to be able to tolerate playing stand up bass(progressing - not met)  3. Pt will demonstrate independent gait with minimal gait deviations(progressing - not met)  Plan     Plan of care Certification: 11/15/2018 to 2/15/19.     Outpatient Physical Therapy 2 times weekly for 12 weeks to include the following interventions: Dry Needling, Aquatic Therapy, Cervical/Lumbar Traction, Electrical Stimulation TENS, Gait Training, Manual Therapy, Moist Heat/ Ice, Neuromuscular Re-ed, Orthotic Management and Training, Patient Education, Self Care, Therapeutic Activites, Therapeutic Exercise and Ultrasound.     Antonia Ca, PT

## 2019-01-09 ENCOUNTER — CLINICAL SUPPORT (OUTPATIENT)
Dept: REHABILITATION | Facility: HOSPITAL | Age: 69
End: 2019-01-09
Attending: NEUROLOGICAL SURGERY
Payer: COMMERCIAL

## 2019-01-09 DIAGNOSIS — M54.16 LUMBAR BACK PAIN WITH RADICULOPATHY AFFECTING LOWER EXTREMITY: ICD-10-CM

## 2019-01-09 PROCEDURE — 97113 AQUATIC THERAPY/EXERCISES: CPT

## 2019-01-09 NOTE — PROGRESS NOTES
Physical Therapy Daily Treatment Note     Name: Raffy Rutherford Jr.  Clinic Number: [id number]    Therapy Diagnosis:   Encounter Diagnosis   Name Primary?    Lumbar back pain with radiculopathy affecting lower extremity      Physician: Jason Caldwell MD    Visit Date: 1/9/2019  Physician Orders: PT Eval and Treat Low back pain w/ bilat radiculopathy  Medical Diagnosis from Referral: SI joint dysfunction, s/p lumbar fusion, s/p R SHARAD, polyneuropathy  Evaluation Date: 11/15/2018  Authorization Period Expiration: 12/31/2019  Plan of Care Expiration: 2/15/19  Visit # / Visits authorized: 3/30    Time In: 1300  Time Out: 1355  Total Billable Time: 55 minutes    Precautions: Standard    Subjective     Pt reports: 6/10 LBP with lots of tightness  He was compliant with home exercise program.  Response to previous treatment: No change in back pain  Functional change: using B AFO less with mobility    Pain: 6/10  Location: Back    Objective     Raffy received aquatic therapeutic exercises to develop strength, endurance, ROM, flexibility, posture and core stabilization for 60 minutes including:    WARMUP  Walking fwd/back/side x 2 laps    STRETCHES  HS  quad  Piriformis- R only    LE EX  Mini Squat with QS x30  Heel raise with GS x30  Single leg heel raise x10  Fwd step up x 30x  Lateral steps up x 30 with intermittent UE support  Step down with UE support x 30  Hip hikes x 30  Walking lunges in // bars x 4 laps(VC for push off)  Hip abd/add x 30-   Abduction walks in mini squat stance with B hip IR x 2 lap  Monster walks x 2 laps-    Ankle stabilization:  Heel walks in // bars 2 laps   Toe walks in // bars with UE support 2 laps  Static standing in // bars in tandem stance 2x45 sec with each LE in the posterior position   Tandem walks fwd/back in // bars with occasional UE support 2 laps   Marching with 3 sec holds   Mini squat hold with chest press of disc x 20  Orange tb for ankle pf/eversion/inversion x 15  reps-np    Core: -NP  Shoulder horizontal abd/add in modified tandem with apc 30x  Shoulder flx/ext in modified tandem with apc 30x  Shoulder extensions with 1 DB submersion just below surface in long lever arm position 7 x 20 sec each   Wall sits w/ aquatic 1 aquatic weights in long lever arm position 3x30 sec NP  Static standing in mini squat position with yellow ball submersion chest press 3x10 -np    ENDURANCE  Marching x 3 min-NP    COOL DOWN  1 lap fwd/back/sideways    Manual spinal stretches for 3 x 15 sec    Home Exercises Provided and Patient Education Provided     Education provided:   - knee to chest stretches & truncal rotations(HEP2Go [id number])    Written Home Exercises Provided: yes.  Exercises were reviewed and Raffy was able to demonstrate them prior to the end of the session.  Raffy demonstrated good  understanding of the education provided.     See EMR under Media for exercises provided 12/11/2018. Added additional ex of towel scrunches & single leg stance against wall(1/7/2019)    Assessment     Pt spencer tx with ther ex well, good form, posture, core stab tech with few VCs required.  Raffy is highly motivated & participatory with PT treatment &  adherent to  HEP  Raffy is progressing well towards his goals.   Pt prognosis is Good.     Pt will continue to benefit from skilled aquatic outpatient physical therapy to address the deficits listed in the problem list box on initial evaluation, provide pt/family education and to maximize pt's level of independence in the home and community environment.     Pt's spiritual, cultural and educational needs considered and pt agreeable to plan of care and goals.    Anticipated barriers to physical therapy: none    Goals:  Short Term Goals: 4 weeks   1. Pt will be able to tolerate 20 minutes of driving reporting no more than 10% increase in back pain/ radicular sx(progresing- not met)  2. Pt will be able to ascend/descend 12 steps with one railing reporting no  increase in pain in order to be able to navigate to work(progressing - not met)  3. Pt will be independent with HEP and report compliance at least 5 days/week     Long Term Goals: 8 weeks   1. Pt will demonstrate B foot MMT of at least 4/5 throughout to demonstrate improved functional mobility(progressing - not met)  2. Pt will be able to tolerate 1 hour of standing reporting no increase in sx to be able to tolerate playing stand up bass(progressing - not met)  3. Pt will demonstrate independent gait with minimal gait deviations(progressing - not met)  Plan     Plan of care Certification: 11/15/2018 to 2/15/19.     Outpatient Physical Therapy 2 times weekly for 12 weeks to include the following interventions: Dry Needling, Aquatic Therapy, Cervical/Lumbar Traction, Electrical Stimulation TENS, Gait Training, Manual Therapy, Moist Heat/ Ice, Neuromuscular Re-ed, Orthotic Management and Training, Patient Education, Self Care, Therapeutic Activites, Therapeutic Exercise and Ultrasound.     Antonia Ca, PT

## 2019-01-10 ENCOUNTER — CLINICAL SUPPORT (OUTPATIENT)
Dept: AUDIOLOGY | Facility: CLINIC | Age: 69
End: 2019-01-10

## 2019-01-10 DIAGNOSIS — H90.3 SENSORINEURAL HEARING LOSS, BILATERAL: Primary | ICD-10-CM

## 2019-01-10 PROCEDURE — 99499 NO LOS: ICD-10-PCS | Mod: S$GLB,,, | Performed by: AUDIOLOGIST

## 2019-01-10 PROCEDURE — 99499 UNLISTED E&M SERVICE: CPT | Mod: S$GLB,,, | Performed by: AUDIOLOGIST

## 2019-01-10 NOTE — PROGRESS NOTES
Patient seen for HAFU. He is still not sure that he is noticing much benefit from his right hearing aid. We discussed possibly waiting for updated technology to see if he notices a more significant benefit with hearing devices. The patient was given a set of loaners so he can determine the benefit of binaural amplification over monaural amplification. These hearing aids were programmed very similarly to his right hearing aid and paired with his iPhone. The patient will return on 1/22/2019 for a HAFU appointment. The patient was made aware that he is responsible for these devices while they are in his possession (SN: [id number], [id number]).

## 2019-01-14 ENCOUNTER — CLINICAL SUPPORT (OUTPATIENT)
Dept: REHABILITATION | Facility: HOSPITAL | Age: 69
End: 2019-01-14
Attending: NEUROLOGICAL SURGERY
Payer: COMMERCIAL

## 2019-01-14 DIAGNOSIS — M54.16 LUMBAR BACK PAIN WITH RADICULOPATHY AFFECTING LOWER EXTREMITY: ICD-10-CM

## 2019-01-14 PROCEDURE — 97113 AQUATIC THERAPY/EXERCISES: CPT

## 2019-01-14 NOTE — PROGRESS NOTES
Physical Therapy Daily Treatment Note     Name: Raffy Rutherford Jr.  Clinic Number: [id number]    Therapy Diagnosis:   Encounter Diagnosis   Name Primary?    Lumbar back pain with radiculopathy affecting lower extremity      Physician: Jason Caldwell MD    Visit Date: 1/14/2019  Physician Orders: PT Eval and Treat Low back pain w/ bilat radiculopathy  Medical Diagnosis from Referral: SI joint dysfunction, s/p lumbar fusion, s/p R SHARAD, polyneuropathy  Evaluation Date: 11/15/2018  Authorization Period Expiration: 12/31/2019  Plan of Care Expiration: 2/15/19  Visit # / Visits authorized: 4/30    Time In: 1400  Time Out: 1500  Total Billable Time:60 minutes    Precautions: Standard    Subjective     Pt reports: 6/10 LBP with lots of tightness  He was compliant with home exercise program.  Response to previous treatment: No change in back pain  Functional change: using B AFO less with mobility    Pain: 6/10  Location: Back    Objective     Raffy received aquatic therapeutic exercises to develop strength, endurance, ROM, flexibility, posture and core stabilization for 60 minutes including:    WARMUP  Walking fwd/back/side x 2 laps    STRETCHES  HS  quad  Piriformis- R only    LE EX  Mini Squat with QS x30  Heel raise with GS x30  Single leg heel raise x10  Fwd step up x 30x  Lateral steps up x 30 with intermittent UE support  Step down with UE support x 30  Hip hikes x 30  Walking lunges in // bars x 4 laps(VC for push off)  Hip abd/add x 30-   Abduction walks in mini squat stance with B hip IR x 2 lap  Monster walks x 2 laps-    Ankle stabilization:  Heel walks in // bars 2 laps   Toe walks in // bars with UE support 2 laps  Static standing in // bars in tandem stance 2x45 sec with each LE in the posterior position   Tandem walks fwd/back in // bars with occasional UE support 2 laps   Marching with 3 sec holds   Mini squat hold with chest press of disc x 20  Walking lunge with ball rotation x 2 laps each  Staggered  stance for ball toss x 20(loss of ballance x 2)    Orange tb for ankle pf/eversion/inversion x 15 reps-np    Core:   Shoulder horizontal abd/add in modified tandem with apc 30x-np  Shoulder flx/ext in modified tandem with apc 30x-np  Shoulder extensions with 1 DB submersion just below surface in long lever arm position 3 x 30 sec each   Wall sits w/ aquatic 1 aquatic weights in long lever arm position 3x30 sec NP  Static standing in mini squat position with yellow ball submersion chest press 3x10 -np    ENDURANCE  Marching x 3 min-NP    COOL DOWN  1 lap fwd/back/sideways    Manual spinal stretches for 3 x 15 sec    Home Exercises Provided and Patient Education Provided     Education provided:   - knee to chest stretches & truncal rotations(HEP2Go [id number])    Written Home Exercises Provided: yes.  Exercises were reviewed and Raffy was able to demonstrate them prior to the end of the session.  Raffy demonstrated good  understanding of the education provided.     See EMR under Media for exercises provided 12/11/2018. Added additional ex of towel scrunches & single leg stance against wall(1/7/2019)    Assessment     Pt spencer tx with ther ex well, good form, posture, core stab tech with few VCs required.  Raffy is highly motivated & participatory with PT treatment &  adherent to  HEP  Raffy is progressing well towards his goals.   Pt prognosis is Good.     Pt will continue to benefit from skilled aquatic outpatient physical therapy to address the deficits listed in the problem list box on initial evaluation, provide pt/family education and to maximize pt's level of independence in the home and community environment.     Pt's spiritual, cultural and educational needs considered and pt agreeable to plan of care and goals.    Anticipated barriers to physical therapy: none    Goals:  Short Term Goals: 4 weeks   1. Pt will be able to tolerate 20 minutes of driving reporting no more than 10% increase in back pain/ radicular  sx(progresing- not met)  2. Pt will be able to ascend/descend 12 steps with one railing reporting no increase in pain in order to be able to navigate to work(progressing - not met)  3. Pt will be independent with HEP and report compliance at least 5 days/week     Long Term Goals: 8 weeks   1. Pt will demonstrate B foot MMT of at least 4/5 throughout to demonstrate improved functional mobility(progressing - not met)  2. Pt will be able to tolerate 1 hour of standing reporting no increase in sx to be able to tolerate playing stand up bass(progressing - not met)  3. Pt will demonstrate independent gait with minimal gait deviations(progressing - not met)  Plan     Plan of care Certification: 11/15/2018 to 2/15/19.     Outpatient Physical Therapy 2 times weekly for 12 weeks to include the following interventions: Dry Needling, Aquatic Therapy, Cervical/Lumbar Traction, Electrical Stimulation TENS, Gait Training, Manual Therapy, Moist Heat/ Ice, Neuromuscular Re-ed, Orthotic Management and Training, Patient Education, Self Care, Therapeutic Activites, Therapeutic Exercise and Ultrasound.     Antonia Ca, PT

## 2019-01-16 ENCOUNTER — CLINICAL SUPPORT (OUTPATIENT)
Dept: REHABILITATION | Facility: HOSPITAL | Age: 69
End: 2019-01-16
Attending: NEUROLOGICAL SURGERY
Payer: COMMERCIAL

## 2019-01-16 DIAGNOSIS — M54.16 LUMBAR BACK PAIN WITH RADICULOPATHY AFFECTING LOWER EXTREMITY: ICD-10-CM

## 2019-01-16 PROCEDURE — 97113 AQUATIC THERAPY/EXERCISES: CPT

## 2019-01-16 NOTE — PROGRESS NOTES
Physical Therapy Daily Treatment Note     Name: Raffy Rutherford Jr.  Clinic Number: [id number]    Therapy Diagnosis:   Encounter Diagnosis   Name Primary?    Lumbar back pain with radiculopathy affecting lower extremity      Physician: Jason Caldwell MD    Visit Date: 1/16/2019  Physician Orders: PT Eval and Treat Low back pain w/ bilat radiculopathy  Medical Diagnosis from Referral: SI joint dysfunction, s/p lumbar fusion, s/p R SHARAD, polyneuropathy  Evaluation Date: 11/15/2018  Authorization Period Expiration: 12/31/2019  Plan of Care Expiration: 2/15/19  Visit # / Visits authorized: 5/30    Time In: 1300  Time Out: 1400  Total Billable Time:60 minutes    Precautions: Standard    Subjective     Pt reports: 7/10 LBP with lots of tightness 2/2 sitting for 20 minutes to practice playing his bass   He was compliant with home exercise program.  Response to previous treatment: No change in back pain  Functional change: using B AFO less with mobility    Pain: 7/10  Location: Back    Objective     Raffy received aquatic therapeutic exercises to develop strength, endurance, ROM, flexibility, posture and core stabilization for 60 minutes including:    WARMUP  Walking fwd/back/side x 2 laps    STRETCHES  HS  quad  Piriformis- R only    LE EX  Mini Squat with QS x30  Heel raise with GS x30  Single leg heel raise x10  Fwd step up x 30x  Lateral steps up x 30 with intermittent UE support  Step down with UE support x 30  Hip hikes x 30  Walking lunges in // bars x 4 laps(VC for push off)  Hip abd/add x 30-   Abduction walks in mini squat stance with B hip IR x 2 lap  Monster walks x 2 laps-    Ankle stabilization:  Heel walks in // bars 2 laps   Toe walks in // bars with UE support 2 laps  Static standing in // bars in tandem stance 2x45 sec with each LE in the posterior position   Tandem walks fwd/back in // bars with occasional UE support 2 laps   Marching with 3 sec holds   Mini squat hold with chest press of disc x  20  Walking lunge with ball rotation x 2 laps each  Staggered stance for ball toss x 20(loss of ballance x 2)  Walking lunges with ball rotation x 2 laps  Braiding holding railing x 2 laps(LOB x2)  Staggered stance for catch & throw ball(LOB x4) 2/2 fair activation of ankle df    Orange tb for ankle pf/eversion/inversion x 15 reps-np    Core:   Shoulder horizontal abd/add in modified tandem with apc 30x-np  Shoulder flx/ext in modified tandem with apc 30x-np  Shoulder extensions with 1 DB submersion just below surface in long lever arm position 3 x 30 sec each   Wall sits w/ aquatic 1 aquatic weights in long lever arm position 3x30 sec NP  Static standing in mini squat position with yellow ball submersion chest press 3x10 -np    ENDURANCE  Marching x 3 min-NP    COOL DOWN  1 lap fwd/back/sideways    Manual spinal stretches for 3 x 15 sec-np  Patient brought SI stabilization belt as recommended by land PT; assisted patient in proper fit    Home Exercises Provided and Patient Education Provided     Education provided:   - knee to chest stretches & truncal rotations(HEP2Go [id number])    Written Home Exercises Provided: yes.  Exercises were reviewed and Raffy was able to demonstrate them prior to the end of the session.  Raffy demonstrated good  understanding of the education provided.     See EMR under Media for exercises provided 12/11/2018. Added additional ex of towel scrunches & single leg stance against wall(1/7/2019)    Assessment     Pt spencer tx with ther ex well, good form, posture, core stab tech with few VCs required.  Raffy is highly motivated & participatory with PT treatment &  adherent to  HEP.   Raffy is progressing well towards his goals.   Pt prognosis is Good.     Pt will continue to benefit from skilled aquatic outpatient physical therapy to address the deficits listed in the problem list box on initial evaluation, provide pt/family education and to maximize pt's level of independence in the home and  community environment.     Pt's spiritual, cultural and educational needs considered and pt agreeable to plan of care and goals.    Anticipated barriers to physical therapy: none    Goals:  Short Term Goals: 4 weeks   1. Pt will be able to tolerate 20 minutes of driving reporting no more than 10% increase in back pain/ radicular sx(progresing- not met)  2. Pt will be able to ascend/descend 12 steps with one railing reporting no increase in pain in order to be able to navigate to work(progressing - not met)  3. Pt will be independent with HEP and report compliance at least 5 days/week     Long Term Goals: 8 weeks   1. Pt will demonstrate B foot MMT of at least 4/5 throughout to demonstrate improved functional mobility(progressing - not met)  2. Pt will be able to tolerate 1 hour of standing reporting no increase in sx to be able to tolerate playing stand up bass(progressing - not met)  3. Pt will demonstrate independent gait with minimal gait deviations(progressing - not met)  Plan     Plan of care Certification: 11/15/2018 to 2/15/19.     Outpatient Physical Therapy 2 times weekly for 12 weeks to include the following interventions: Dry Needling, Aquatic Therapy, Cervical/Lumbar Traction, Electrical Stimulation TENS, Gait Training, Manual Therapy, Moist Heat/ Ice, Neuromuscular Re-ed, Orthotic Management and Training, Patient Education, Self Care, Therapeutic Activites, Therapeutic Exercise and Ultrasound.     Antonia Ca, PT

## 2019-01-21 ENCOUNTER — CLINICAL SUPPORT (OUTPATIENT)
Dept: REHABILITATION | Facility: HOSPITAL | Age: 69
End: 2019-01-21
Attending: NEUROLOGICAL SURGERY
Payer: COMMERCIAL

## 2019-01-21 DIAGNOSIS — M54.16 LUMBAR BACK PAIN WITH RADICULOPATHY AFFECTING LOWER EXTREMITY: ICD-10-CM

## 2019-01-21 PROCEDURE — 97113 AQUATIC THERAPY/EXERCISES: CPT

## 2019-01-21 NOTE — PROGRESS NOTES
Physical Therapy Daily Treatment Note     Name: Raffy Rutherford Jr.  Clinic Number: [id number]    Therapy Diagnosis:   Encounter Diagnosis   Name Primary?    Lumbar back pain with radiculopathy affecting lower extremity      Physician: Jason Caldwell MD    Visit Date: 1/21/2019  Physician Orders: PT Eval and Treat Low back pain w/ bilat radiculopathy  Medical Diagnosis from Referral: SI joint dysfunction, s/p lumbar fusion, s/p R SHARAD, polyneuropathy  Evaluation Date: 11/15/2018  Authorization Period Expiration: 12/31/2019  Plan of Care Expiration: 2/15/19  Visit # / Visits authorized: 6/30    Time In: 1300  Time Out: 1400  Total Billable Time:60 minutes    Precautions: Standard    Subjective     Pt reports: 3/10 back use SI belt @ [employer]; difficulty navigating steps  He was compliant with home exercise program.  Response to previous treatment: decreased back pain  Functional change: using B AFO less with mobility    Pain: 3/10  Location: Back    Objective     Raffy received aquatic therapeutic exercises to develop strength, endurance, ROM, flexibility, posture and core stabilization for 60 minutes including:    WARMUP  Walking fwd/back/side x 2 laps    STRETCHES  HS  quad  Piriformis- R only    LE EX  Mini Squat with QS x30  Heel raise with GS x30  Single leg heel raise x10  Fwd step up x 30x-np  Lateral steps up x 30 with intermittent UE support-np  Step down with UE support x 30  Hip hikes x 30  Walking lunges in // bars x 4 laps(VC for push off)  Hip abd/add x 30-   Abduction walks in mini squat stance with B hip IR x 2 lap  Monster walks x 2 laps-  Fwd steps up on 2 steps x 10 each  Step down on 2 steps x 10 each  Running with push off on toes x 4 laps with 30-45 sec rest between each lap    Ankle stabilization:  Heel walks in // bars 2 laps   Toe walks in // bars with UE support 2 laps  Static standing in // bars in tandem stance 2x45 sec with each LE in the posterior position   Tandem walks fwd/back in  // bars with occasional UE support 2 laps   Marching with 3 sec holds & focus on cocontraction of quads/hamstring with stance phase  Mini squat hold with chest press of disc x 20  Staggered stance for ball toss x 20(loss of ballance x 2)  Walking lunges with ball rotation x 2 laps  Braiding holding railing x 2 laps(LOB x2)  Staggered stance for catch & throw ball(LOB x1) 2/2 fair activation of ankle df    Orange tb for ankle pf/eversion/inversion x 15 reps-np    Core: NP  Shoulder horizontal abd/add in modified tandem with apc 30x-np  Shoulder flx/ext in modified tandem with apc 30x-np  Shoulder extensions with 1 DB submersion just below surface in long lever arm position 3 x 30 sec each   Wall sits w/ aquatic 1 aquatic weights in long lever arm position 3x30 sec NP  Static standing in mini squat position with yellow ball submersion chest press 3x10 -np    ENDURANCE  Marching x 3 min-NP    COOL DOWN  1 lap fwd/back/sideways    Manual spinal stretches for 3 x 15 sec-np  Patient brought SI stabilization belt as recommended by land PT; assisted patient in proper fit    Home Exercises Provided and Patient Education Provided     Education provided:   - knee to chest stretches & truncal rotations(HEP2Go [id number])    Written Home Exercises Provided: yes.  Exercises were reviewed and Raffy was able to demonstrate them prior to the end of the session.  Raffy demonstrated good  understanding of the education provided.     See EMR under Media for exercises provided 12/11/2018. Added additional ex of towel scrunches & single leg stance against wall(1/7/2019)    Assessment     Pt spencer tx with ther ex well, good form, posture, core stab tech with few VCs required.  Raffy is highly motivated & participatory with PT treatment &  adherent to  HEP.   Raffy is progressing well towards his goals.   Pt prognosis is Good.     Pt will continue to benefit from skilled aquatic outpatient physical therapy to address the deficits listed in  the problem list box on initial evaluation, provide pt/family education and to maximize pt's level of independence in the home and community environment.     Pt's spiritual, cultural and educational needs considered and pt agreeable to plan of care and goals.    Anticipated barriers to physical therapy: none    Goals:  Short Term Goals: 4 weeks   1. Pt will be able to tolerate 20 minutes of driving reporting no more than 10% increase in back pain/ radicular sx(progresing- not met)  2. Pt will be able to ascend/descend 12 steps with one railing reporting no increase in pain in order to be able to navigate to work(progressing - not met)  3. Pt will be independent with HEP and report compliance at least 5 days/week     Long Term Goals: 8 weeks   1. Pt will demonstrate B foot MMT of at least 4/5 throughout to demonstrate improved functional mobility(progressing - not met)  2. Pt will be able to tolerate 1 hour of standing reporting no increase in sx to be able to tolerate playing stand up bass(progressing - not met)  3. Pt will demonstrate independent gait with minimal gait deviations(progressing - not met)  Plan     Plan of care Certification: 11/15/2018 to 2/15/19.     Outpatient Physical Therapy 2 times weekly for 12 weeks to include the following interventions: Dry Needling, Aquatic Therapy, Cervical/Lumbar Traction, Electrical Stimulation TENS, Gait Training, Manual Therapy, Moist Heat/ Ice, Neuromuscular Re-ed, Orthotic Management and Training, Patient Education, Self Care, Therapeutic Activites, Therapeutic Exercise and Ultrasound.     Antonia Ca, PT

## 2019-01-22 ENCOUNTER — CLINICAL SUPPORT (OUTPATIENT)
Dept: AUDIOLOGY | Facility: CLINIC | Age: 69
End: 2019-01-22
Payer: COMMERCIAL

## 2019-01-22 DIAGNOSIS — H90.3 SENSORINEURAL HEARING LOSS, BILATERAL: Primary | ICD-10-CM

## 2019-01-22 PROCEDURE — 99999 PR PBB SHADOW E&M-EST. PATIENT-LVL I: CPT | Mod: PBBFAC,,, | Performed by: AUDIOLOGIST

## 2019-01-22 PROCEDURE — 99999 PR PBB SHADOW E&M-EST. PATIENT-LVL I: ICD-10-PCS | Mod: PBBFAC,,, | Performed by: AUDIOLOGIST

## 2019-01-22 PROCEDURE — 99499 NO LOS: ICD-10-PCS | Mod: S$GLB,,, | Performed by: AUDIOLOGIST

## 2019-01-22 PROCEDURE — 99499 UNLISTED E&M SERVICE: CPT | Mod: S$GLB,,, | Performed by: AUDIOLOGIST

## 2019-01-22 NOTE — PROGRESS NOTES
Mr. Rutherford returned for a [facility] appointment. He reported that he did not notice much benefit with two hearing aids over one. He elected to return the loaners and return the one right hearing aid for now. He was encouraged to return for annual audiograms. We discussed alternative tinnitus treatments, such as Neuromonics. The patient was encouraged to call if he has any questions.

## 2019-01-23 ENCOUNTER — CLINICAL SUPPORT (OUTPATIENT)
Dept: REHABILITATION | Facility: HOSPITAL | Age: 69
End: 2019-01-23
Attending: NEUROLOGICAL SURGERY
Payer: COMMERCIAL

## 2019-01-23 DIAGNOSIS — M54.16 LUMBAR BACK PAIN WITH RADICULOPATHY AFFECTING LOWER EXTREMITY: ICD-10-CM

## 2019-01-23 PROCEDURE — 97113 AQUATIC THERAPY/EXERCISES: CPT

## 2019-01-23 NOTE — PROGRESS NOTES
Physical Therapy Daily Treatment Note     Name: Raffy Rutherford Jr.  Clinic Number: [id number]    Therapy Diagnosis:   Encounter Diagnosis   Name Primary?    Lumbar back pain with radiculopathy affecting lower extremity      Physician: Jason Caldwell MD    Visit Date: 1/23/2019  Physician Orders: PT Eval and Treat Low back pain w/ bilat radiculopathy  Medical Diagnosis from Referral: SI joint dysfunction, s/p lumbar fusion, s/p R SHARAD, polyneuropathy  Evaluation Date: 11/15/2018  Authorization Period Expiration: 12/31/2019  Plan of Care Expiration: 2/15/19  Visit # / Visits authorized: 7/30    Time In: 1300  Time Out: 1400  Total Billable Time:60 minutes    Precautions: Standard    Subjective     Pt reports: 3/10 back using SI belt for support  He was compliant with home exercise program.  Response to previous treatment: soreness x 1 day in quad muscles  Functional change: using B AFO less with mobility    Pain: 3/10  Location: Back    Objective     Raffy received aquatic therapeutic exercises to develop strength, endurance, ROM, flexibility, posture and core stabilization for 60 minutes including:    WARMUP  Walking fwd/back/side x 2 laps    STRETCHES  HS  quad  Piriformis- R only    LE EX  Mini Squat with QS x30-np  Heel raise with GS x30  Single leg heel raise x10-np  Fwd step up x 30x-np  Lateral steps up x 30 with intermittent UE support-np  Step up/down/lateral on box x 10 each with single UE support  Hip hikes x 30-np  Walking lunges in // bars x 4 laps(VC for push off)-np  Hip abd/add x 30- np  Abduction walks in mini squat stance with B hip IR x 2 lap-np  Monster walks x 2 laps-np  Fwd steps up on 2 steps x 10 each-np  Step down on 2 steps x 10 each-np  Running with push off on toes x 4 laps with 30-45 sec rest between each lap  Resistive walking with PT holding orange tband fwd/back/side x 1/2 lap x2 each direction     Ankle stabilization:  Heel walks in // bars 2 laps   Toe walks in // bars with UE  support 2 laps  Static standing in // bars in tandem stance 2x45 sec with each LE in the posterior position -np  Tandem walks fwd/back in // bars with occasional UE support 2 laps   Marching with 3 sec holds & focus on cocontraction of quads/hamstring with stance phase  Mini squat hold with chest press of disc x 20-np  Walking lunges with ball rotation x 2 laps  Braiding holding railing x 2 laps(LOB x2)  Staggered stance for catch & throw ball(LOB x1) 2/2 fair activation of ankle df  Seated wonder board using BUE/LE to propel fwd/back/side x 2 laps each    Orange tb for ankle pf/eversion/inversion x 15 reps-np    Core: NP  Shoulder horizontal abd/add in modified tandem with apc 30x-np  Shoulder flx/ext in modified tandem with apc 30x-np  Shoulder extensions with 1 DB submersion just below surface in long lever arm position 3 x 30 sec each   Wall sits w/ aquatic 1 aquatic weights in long lever arm position 3x30 sec NP  Static standing in mini squat position with yellow ball submersion chest press 3x10 -np    ENDURANCE  Marching x 3 min-NP    COOL DOWN  1 lap fwd/back/sideways    Manual spinal stretches for 3 x 15 sec-np  Patient brought SI stabilization belt as recommended by land PT; assisted patient in proper fit    Home Exercises Provided and Patient Education Provided     Education provided:   - knee to chest stretches & truncal rotations(HEP2Go [id number])    Written Home Exercises Provided: yes.  Exercises were reviewed and Raffy was able to demonstrate them prior to the end of the session.  Raffy demonstrated good  understanding of the education provided.     See EMR under Media for exercises provided 12/11/2018. Added additional ex of towel scrunches & single leg stance against wall(1/7/2019)    Assessment     Pt spencer tx with ther ex well, good form, posture, core stab tech with few VCs required. Tolerated progression of exercises without any increases in pain Raffy is highly motivated & participatory with PT  treatment &  adherent to  HEP.   Raffy is progressing well towards his goals.   Pt prognosis is Good.     Pt will continue to benefit from skilled aquatic outpatient physical therapy to address the deficits listed in the problem list box on initial evaluation, provide pt/family education and to maximize pt's level of independence in the home and community environment.     Pt's spiritual, cultural and educational needs considered and pt agreeable to plan of care and goals.    Anticipated barriers to physical therapy: none    Goals:  Short Term Goals: 4 weeks   1. Pt will be able to tolerate 20 minutes of driving reporting no more than 10% increase in back pain/ radicular sx(progresing- not met)  2. Pt will be able to ascend/descend 12 steps with one railing reporting no increase in pain in order to be able to navigate to work(progressing - not met)  3. Pt will be independent with HEP and report compliance at least 5 days/week     Long Term Goals: 8 weeks   1. Pt will demonstrate B foot MMT of at least 4/5 throughout to demonstrate improved functional mobility(progressing - not met)  2. Pt will be able to tolerate 1 hour of standing reporting no increase in sx to be able to tolerate playing stand up bass(progressing - not met)  3. Pt will demonstrate independent gait with minimal gait deviations(progressing - not met)  Plan     Plan of care Certification: 11/15/2018 to 2/15/19.     Outpatient Physical Therapy 2 times weekly for 12 weeks to include the following interventions: Dry Needling, Aquatic Therapy, Cervical/Lumbar Traction, Electrical Stimulation TENS, Gait Training, Manual Therapy, Moist Heat/ Ice, Neuromuscular Re-ed, Orthotic Management and Training, Patient Education, Self Care, Therapeutic Activites, Therapeutic Exercise and Ultrasound.     Antonia Ca, PT

## 2019-02-01 DIAGNOSIS — M25.532 LEFT WRIST PAIN: ICD-10-CM

## 2019-02-01 DIAGNOSIS — S69.90XA INJURY OF WRIST, UNSPECIFIED LATERALITY, INITIAL ENCOUNTER: Primary | ICD-10-CM

## 2019-02-04 ENCOUNTER — HOSPITAL ENCOUNTER (OUTPATIENT)
Dept: RADIOLOGY | Facility: HOSPITAL | Age: 69
Discharge: HOME | End: 2019-02-04
Attending: PHYSICIAN ASSISTANT
Payer: COMMERCIAL

## 2019-02-04 DIAGNOSIS — M25.532 LEFT WRIST PAIN: ICD-10-CM

## 2019-02-04 PROCEDURE — 73130 XR HAND COMPLETE 3 VIEW LEFT: ICD-10-PCS | Mod: 26,LT,, | Performed by: RADIOLOGY

## 2019-02-04 PROCEDURE — 73130 X-RAY EXAM OF HAND: CPT | Mod: TC,LT

## 2019-02-04 PROCEDURE — 73130 X-RAY EXAM OF HAND: CPT | Mod: 26,LT,, | Performed by: RADIOLOGY

## 2019-02-05 ENCOUNTER — OFFICE VISIT (OUTPATIENT)
Dept: ORTHOPEDICS | Facility: CLINIC | Age: 69
End: 2019-02-05
Payer: COMMERCIAL

## 2019-02-05 ENCOUNTER — DOCUMENTATION ONLY (OUTPATIENT)
Dept: ORTHOPEDICS | Facility: CLINIC | Age: 69
End: 2019-02-05

## 2019-02-05 ENCOUNTER — TELEPHONE (OUTPATIENT)
Dept: ORTHOPEDICS | Facility: CLINIC | Age: 69
End: 2019-02-05

## 2019-02-05 VITALS
BODY MASS INDEX: 25.76 KG/M2 | SYSTOLIC BLOOD PRESSURE: 131 MMHG | HEART RATE: 93 BPM | HEIGHT: 68 IN | WEIGHT: 170 LBS | DIASTOLIC BLOOD PRESSURE: 88 MMHG

## 2019-02-05 DIAGNOSIS — S52.502A CLOSED FRACTURE OF DISTAL END OF LEFT RADIUS, UNSPECIFIED FRACTURE MORPHOLOGY, INITIAL ENCOUNTER: Primary | ICD-10-CM

## 2019-02-05 PROCEDURE — 29075 PR APPLY FOREARM CAST: ICD-10-PCS | Mod: LT,S$GLB,, | Performed by: PHYSICIAN ASSISTANT

## 2019-02-05 PROCEDURE — 99999 PR PBB SHADOW E&M-EST. PATIENT-LVL V: CPT | Mod: PBBFAC,,, | Performed by: PHYSICIAN ASSISTANT

## 2019-02-05 PROCEDURE — 29075 APPL CST ELBW FNGR SHORT ARM: CPT | Mod: LT,S$GLB,, | Performed by: PHYSICIAN ASSISTANT

## 2019-02-05 PROCEDURE — 99214 OFFICE O/P EST MOD 30 MIN: CPT | Mod: 25,S$GLB,, | Performed by: PHYSICIAN ASSISTANT

## 2019-02-05 PROCEDURE — 3288F PR FALLS RISK ASSESSMENT DOCUMENTED: ICD-10-PCS | Mod: CPTII,S$GLB,, | Performed by: PHYSICIAN ASSISTANT

## 2019-02-05 PROCEDURE — 1100F PR PT FALLS ASSESS DOC 2+ FALLS/FALL W/INJURY/YR: ICD-10-PCS | Mod: CPTII,S$GLB,, | Performed by: PHYSICIAN ASSISTANT

## 2019-02-05 PROCEDURE — 3288F FALL RISK ASSESSMENT DOCD: CPT | Mod: CPTII,S$GLB,, | Performed by: PHYSICIAN ASSISTANT

## 2019-02-05 PROCEDURE — 1100F PTFALLS ASSESS-DOCD GE2>/YR: CPT | Mod: CPTII,S$GLB,, | Performed by: PHYSICIAN ASSISTANT

## 2019-02-05 PROCEDURE — 99214 PR OFFICE/OUTPT VISIT, EST, LEVL IV, 30-39 MIN: ICD-10-PCS | Mod: 25,S$GLB,, | Performed by: PHYSICIAN ASSISTANT

## 2019-02-05 PROCEDURE — 99999 PR PBB SHADOW E&M-EST. PATIENT-LVL V: ICD-10-PCS | Mod: PBBFAC,,, | Performed by: PHYSICIAN ASSISTANT

## 2019-02-05 RX ORDER — LITHIUM CARBONATE 300 MG/1
150 CAPSULE ORAL 2 TIMES DAILY
Status: ON HOLD | COMMUNITY
End: 2019-09-19 | Stop reason: CLARIF

## 2019-02-05 RX ORDER — HYDROCODONE BITARTRATE AND ACETAMINOPHEN 5; 325 MG/1; MG/1
1 TABLET ORAL
Qty: 42 TABLET | Refills: 0 | Status: SHIPPED | OUTPATIENT
Start: 2019-02-05 | End: 2019-03-11 | Stop reason: SDUPTHER

## 2019-02-05 NOTE — TELEPHONE ENCOUNTER
----- Message from Karlie Denny MA sent at 2/5/2019  9:48 AM CST -----  Good morning, can you please schedule this pt for an appointment with [facility] for [facility].  Thanks Karlie

## 2019-02-05 NOTE — PROGRESS NOTES
Subjective:      Patient ID: Raffy Rutherford Jr. is a 68 y.o. male.    Chief Complaint: Injury and Pain of the Left Wrist      HPI  Raffy Rutherford Jr. is a right hand dominant 68 y.o. male presenting today for left wrist pain.  There was a history of trauma, he tripped on a raised door stop/ledge while on vacation in [place].  Onset of symptoms began 1 week ago.  He did see a physician in [facility].  Ultrasound was performed and a fracture was noted, he was provided with a wrist brace and a IM injection of Voltaren and a steroid.  He reports that he does have chronic back pain and has Norco 5/325 for this, he has been taking half a tablet as needed for pain control.  He reports that his pain is 6-7/10, mildly improved with use of Norco.  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         Review of patient's allergies indicates:   Allergen Reactions    Alphagan [brimonidine]      Patient taking MASO-B Selective Inhibitor Selegiline (Emsam)    Coumadin [warfarin]      itch    Oxycodone      hiccups         Current Outpatient Medications   Medication Sig Dispense Refill    clonazePAM (KLONOPIN) 0.5 MG tablet Take 1 tablet (0.5 mg total) by mouth 2 (two) times daily as needed for Anxiety. 60 tablet 2    lamoTRIgine (LAMICTAL) 200 MG tablet Take one tablet by mouth once daily 90 tablet 3    lithium (ESKALITH) 300 MG capsule Take 150 mg by mouth 2 (two) times daily.      pravastatin (PRAVACHOL) 40 MG tablet Take 1 tablet (40 mg total) by mouth once daily. 90 tablet 3    RABEprazole (ACIPHEX) 20 mg tablet Take 1 tablet (20 mg total) by mouth 2 (two) times daily. 180 tablet 3    selegiline (EMSAM) 12 mg/24 hr Place 1 patch onto the skin once daily. 90 patch 3    selegiline (EMSAM) 12 mg/24 hr Place onto the skin once daily. 90 patch 3    senna-docusate 8.6-50 mg (PERICOLACE) 8.6-50 mg per tablet Take 2 tablets by mouth  nightly as needed for Constipation.      sucralfate (CARAFATE) 1 gram tablet as needed.       traZODone (DESYREL) 100 MG tablet Take one tablet by mouth every night at bedtime 90 tablet 3    VOLTAREN 1 % Gel       HYDROcodone-acetaminophen (NORCO) 5-325 mg per tablet Take 1 tablet by mouth every 4 to 6 hours as needed for Pain (moderate to severe). 42 tablet 0     No current facility-administered medications for this visit.        Past Medical History:   Diagnosis Date    Adenomatous polyp 2003    Adenomatous polyp     Allergy     Arthritis     Back pain     after trauma beginning in 195    Cataract     Chronic pain     neck and left shoulder    Cluster headache 2013    Colon polyp     Degenerative disc disease     Depression     Diverticulitis 12/2013    Elevated PSA     Fibromyalgia 2013    GERD (gastroesophageal reflux disease)     Hepatitis 1970's    A    History of prostate biopsy 2002    Hyperlipidemia     Joint pain     Sleep apnea     Special screening for malignant neoplasms, colon 5/5/2014    Thyroid nodule 7/16/2014    Visual disturbance 2012    problems after cataract surgery       Past Surgical History:   Procedure Laterality Date    BACK SURGERY      BLEPHAROPLASTY UPPER LID Bilateral 2/10/2015    Performed by Rhett Lainez III, MD at [facility] OR 2ND FLR    BLOCK, GANGLION IMPAR N/A 6/25/2013    Performed by Araceli Evans MD at [facility], [place], operated by [facility] PAIN MGT    CATARACT EXTRACTION W/  INTRAOCULAR LENS IMPLANT Bilateral     CHOLECYSTECTOMY      COLONOSCOPY N/A 5/5/2014    Performed by Pedro Carlos MD at [facility] ENDO (4TH FLR)    COSMETIC SURGERY  2/10/2015    Direct mid-forehead brow lift    COSMETIC SURGERY  2/10/2015    Bilateral upper lid blepahroplasty    ESOPHAGOGASTRODUODENOSCOPY (EGD) N/A 4/28/2015    Performed by Amadou Hardin MD at [facility] ENDO (4TH FLR)    EYE SURGERY      FUSION EXTREME LATERAL N/A 6/22/2015    Performed by Milad Browne MD at [facility] OR 2ND FLR     "HEMORRHOID SURGERY      with complication of chronic bleeding for 6 weeks     INJECTION, STEROID Right SI Joint Block and Steroid Injection Right 12/6/2018    Per[facility]dwell [facility] OR    JOINT REPLACEMENT      KNEE SURGERY      involving arthroscopic surgery to both knees    LIFT-BROW midforehead direct Bilateral 2/10/2015    Performed by Rhett Lainez III[facility] at [facility] OR 2ND FLR    SINUS SURGERY      SINUS SURGERY      left molar and sinus surgery for trigeminal neuralgia    SPINAL FUSION  6/22/2015    L3-L5 XLIF    SPINE SURGERY  6/22/15    XLIF/TANA    TOTAL HIP ARTHROPLASTY  4/2012    Pt states he had total hip replacement on his left hip.         Review of Systems:  Review of Systems   Constitution: Negative for chills and fever.   Skin: Negative for rash and suspicious lesions.   Musculoskeletal:        See HPI   Neurological: Negative for dizziness, headaches, light-headedness, numbness and paresthesias.   Psychiatric/Behavioral: Negative for depression. The patient is not nervous/anxious.          OBJECTIVE:     PHYSICAL EXAM:  Height: 5' 8" (172.7 cm) Weight: 77.1 kg (169 lb 15.6 oz)  Vitals:    02/05/19 0830   BP: 131/88   Pulse: 93   Weight: 77.1 kg (169 lb 15.6 oz)   Height: 5' 8" (1.727 m)   PainSc:   7     General    Vitals reviewed.  Constitutional: He is oriented to person, place, and time. He appears well-developed and well-nourished.   HENT:   Head: Normocephalic and atraumatic.   Neck: Normal range of motion.   Cardiovascular: Normal rate.    Pulmonary/Chest: Effort normal. No respiratory distress.   Neurological: He is alert and oriented to person, place, and time.   Psychiatric: He has a normal mood and affect. His behavior is normal. Judgment and thought content normal.             Musculoskeletal: Mild edema noted over the left hand and wrist, mild to moderate ecchymosis volarly at the left wrist. No lacerations or abrasions.  Mildly tender palpation over the " left anatomical snuffbox, moderately tender at the left radiocarpal joint. Mildly tender at the 2nd and 3rd metacarpals.  Good finger range of motion left hand.  Decreased left wrist range of motion, most specifically decreased hyperflexion and hyperextension.  Neurovascularly intact-good sensation and motor function, good capillary refill, 2+ radial pulses.      RADIOGRAPHS:  Left Hand X-Ray, 2/4/19  FINDINGS:  There is an impacted fracture of the distal radial metaphysis.  Otherwise bones are fairly well mineralized.  Mild narrowing at the IP joints.  Soft tissue swelling about the wrist.      Impression     Fracture distal radial metaphysis with mild impaction and associated soft tissue swelling.     Comments: I have personally reviewed the imaging and I agree with the above radiologist's report.    ASSESSMENT/PLAN:   Raffy was seen today for injury and pain.    Diagnoses and all orders for this visit:    Closed fracture of distal end of left radius, unspecified fracture morphology, initial encounter  -     X-Ray Wrist Complete Left; Future  -     Ambulatory Referral to Orthopedics    Other orders  -     HYDROcodone-acetaminophen (NORCO) 5-325 mg per tablet; Take 1 tablet by mouth every 4 to 6 hours as needed for Pain (moderate to severe).           - We talked at length about the anatomy and pathophysiology of   Encounter Diagnosis   Name Primary?    Closed fracture of distal end of left radius, unspecified fracture morphology, initial encounter Yes       - x-ray imaging reviewed with the patient. X-rays also reviewed with Dr. Carvajal.  Plan for conservative treatment with immobilization.  - patient placed in a left forearm cast today, fiberglass.  - Follow up in 2 weeks with X-Ray  - Referral to fracture clinic  - Norco 5/325 mg 1 tab po every 4-6 hours as needed for moderate to severe pain; Tylenol for mild pain; discussed max acetaminophen in 24 hours    Disclaimer: This note has been generated using  voice-recognition software. There may be typographical errors that have been missed during proof-reading.'

## 2019-02-07 ENCOUNTER — TELEPHONE (OUTPATIENT)
Dept: ORTHOPEDICS | Facility: CLINIC | Age: 69
End: 2019-02-07

## 2019-02-11 ENCOUNTER — CLINICAL SUPPORT (OUTPATIENT)
Dept: REHABILITATION | Facility: HOSPITAL | Age: 69
End: 2019-02-11
Attending: NEUROLOGICAL SURGERY
Payer: COMMERCIAL

## 2019-02-11 DIAGNOSIS — M54.16 LUMBAR BACK PAIN WITH RADICULOPATHY AFFECTING LOWER EXTREMITY: ICD-10-CM

## 2019-02-11 PROCEDURE — 97113 AQUATIC THERAPY/EXERCISES: CPT

## 2019-02-11 NOTE — PROGRESS NOTES
Physical Therapy Daily Treatment Note     Name: Raffy Rutherford Jr.  Clinic Number: [id number]    Therapy Diagnosis:   No diagnosis found.  Physician: Jason Caldwell MD    Visit Date: 2/11/2019  Physician Orders: PT Eval and Treat Low back pain w/ bilat radiculopathy  Medical Diagnosis from Referral: SI joint dysfunction, s/p lumbar fusion, s/p R SHARAD, polyneuropathy  Evaluation Date: 11/15/2018  Authorization Period Expiration: 12/31/2019  Plan of Care Expiration: 2/15/19  Visit # / Visits authorized: 7/30    Time In: 1300  Time Out: 1400  Total Billable Time:60 minutes    Precautions: Standard    Subjective     Pt reports: 3/10 back using SI belt for support  He was compliant with home exercise program.  Response to previous treatment: soreness x 1 day in quad muscles  Functional change: using B AFO less with mobility    Pain: 3/10 back & 4/10 LEs  Location: Back & legs    Objective   Raffy sustained L distal radius fx while on vacation in [place] 2 weeks ago- has short arm cast (covered with vinyl cast cover)    Raffy received aquatic therapeutic exercises to develop strength, endurance, ROM, flexibility, posture and core stabilization for 60 minutes including:    WARMUP  Walking fwd/back/side x 2 laps    STRETCHES  HS  quad  Piriformis- R only    LE EX  Mini Squat with QS x30    Heel raise with GS x30  Single leg heel raise x10-np  Fwd step up x 30  Lateral steps up x 30 with intermittent UE support-np  Step up/down/lateral on box x 10 each with single UE support  Hip hikes x 30-np  Walking lunges in // bars x 4 laps(VC for push off)-np  Hip abd/add x 30-  Abduction walks in mini squat stance with B hip IR x 2 lap-np  Monster walks x 2 laps-np  Fwd steps up on 2 steps x 10 each-np  Step down on 2 steps x 10 each-np  Running with push off on toes x 4 laps with 30-45 sec rest between each lap  Resistive walking with PT holding orange tband fwd/back/side x 1/2 lap x2 each direction   Modified tandem for hitting  ball with tennis racket(clinton, backhand  &overhead)  X 5 minutes using RUE    Ankle stabilization:  Heel walks in // bars 2 laps-np   Toe walks in // bars with UE support 2 laps  Static standing in // bars in tandem stance 2x45 sec with each LE in the posterior position -np  Tandem walks fwd/back in // bars with occasional UE support 2 laps   Marching with 3 sec holds & focus on cocontraction of quads/hamstring with stance phase  Mini squat hold with chest press of disc x 20-np  Walking lunges with ball rotation x 2 laps  Braiding holding railing x 2 laps(LOB x2)  Staggered stance for catch & throw ball(LOB x1) 2/2 fair activation of ankle df  Seated wonder board using BUE/LE to propel fwd/back/side x 2 laps each    Orange tb for ankle pf/eversion/inversion x 15 reps-np    Core: NP  Shoulder horizontal abd/add in modified tandem with apc 30x-np  Shoulder flx/ext in modified tandem with apc 30x-np  Shoulder extensions with 1 DB submersion just below surface in long lever arm position 3 x 30 sec each   Wall sits w/ aquatic 1 aquatic weights in long lever arm position 3x30 sec NP  Static standing in mini squat position with yellow ball submersion chest press 3x10 -np    ENDURANCE  Marching x 3 min-NP    COOL DOWN  1 lap fwd/back/sideways    Manual spinal stretches for 3 x 15 sec-np  Patient brought SI stabilization belt as recommended by land PT; assisted patient in proper fit    Home Exercises Provided and Patient Education Provided     Education provided:   - knee to chest stretches & truncal rotations(HEP2Go [id number])    Written Home Exercises Provided: yes.  Exercises were reviewed and Raffy was able to demonstrate them prior to the end of the session.  Raffy demonstrated good  understanding of the education provided.     See EMR under Media for exercises provided 12/11/2018. Added additional ex of towel scrunches & single leg stance against wall(1/7/2019)    Assessment     Pt spencer tx with ther ex well, good  form, posture, core stab tech with few VCs required, Modified some exercises 2/2 LUE with cast cover.. Tolerated progression of exercises without any increases in pain Raffy is highly motivated & participatory with PT treatment &  adherent to  HEP.   Raffy is progressing well towards his goals.   Pt prognosis is Good.     Pt will continue to benefit from skilled aquatic outpatient physical therapy to address the deficits listed in the problem list box on initial evaluation, provide pt/family education and to maximize pt's level of independence in the home and community environment.     Pt's spiritual, cultural and educational needs considered and pt agreeable to plan of care and goals.    Anticipated barriers to physical therapy: none    Goals:  Short Term Goals: 4 weeks   1. Pt will be able to tolerate 20 minutes of driving reporting no more than 10% increase in back pain/ radicular sx(progresing- not met)  2. Pt will be able to ascend/descend 12 steps with one railing reporting no increase in pain in order to be able to navigate to work(progressing - not met)  3. Pt will be independent with HEP and report compliance at least 5 days/week     Long Term Goals: 8 weeks   1. Pt will demonstrate B foot MMT of at least 4/5 throughout to demonstrate improved functional mobility(progressing - not met)  2. Pt will be able to tolerate 1 hour of standing reporting no increase in sx to be able to tolerate playing stand up bass(progressing - not met)  3. Pt will demonstrate independent gait with minimal gait deviations(progressing - not met)  Plan     Plan of care Certification: 11/15/2018 to 2/15/19.     Outpatient Physical Therapy 2 times weekly for 12 weeks to include the following interventions: Dry Needling, Aquatic Therapy, Cervical/Lumbar Traction, Electrical Stimulation TENS, Gait Training, Manual Therapy, Moist Heat/ Ice, Neuromuscular Re-ed, Orthotic Management and Training, Patient Education, Self Care, Therapeutic  Activites, Therapeutic Exercise and Ultrasound.     Antonia Ca, PT

## 2019-02-13 ENCOUNTER — CLINICAL SUPPORT (OUTPATIENT)
Dept: REHABILITATION | Facility: HOSPITAL | Age: 69
End: 2019-02-13
Attending: NEUROLOGICAL SURGERY
Payer: COMMERCIAL

## 2019-02-13 DIAGNOSIS — M54.16 LUMBAR BACK PAIN WITH RADICULOPATHY AFFECTING LOWER EXTREMITY: ICD-10-CM

## 2019-02-13 PROCEDURE — 97113 AQUATIC THERAPY/EXERCISES: CPT

## 2019-02-13 PROCEDURE — 97750 PHYSICAL PERFORMANCE TEST: CPT

## 2019-02-13 RX ORDER — LITHIUM CARBONATE 150 MG/1
150 CAPSULE ORAL 2 TIMES DAILY
Qty: 60 CAPSULE | Refills: 5 | Status: CANCELLED | OUTPATIENT
Start: 2019-02-13

## 2019-02-13 NOTE — PROGRESS NOTES
Physical Therapy Daily Treatment Note     Name: Raffy Rutherford Jr.  Clinic Number: [id number]    Therapy Diagnosis:   Encounter Diagnosis   Name Primary?    Lumbar back pain with radiculopathy affecting lower extremity      Physician: Jason Caldwell MD    Visit Date: 2/13/2019  Physician Orders: PT Eval and Treat Low back pain w/ bilat radiculopathy  Medical Diagnosis from Referral: SI joint dysfunction, s/p lumbar fusion, s/p R SHARAD, polyneuropathy  Evaluation Date: 11/15/2018  Authorization Period Expiration: 12/31/2019  Plan of Care Expiration: 2/15/19  Visit # / Visits authorized: 7/30    Time In: 1300  Time Out: 1400  Total Billable Time:60 minutes    Precautions: Standard    Subjective     Pt reports: 3/10 back using SI belt for support  He was compliant with home exercise program.  Response to previous treatment: soreness x 1 day   Functional change: using B AFO less with mobility    Pain: 3/10 back & 4/10 LEs  Location: Back & legs    Objective   Raffy sustained L distal radius fx while on vacation in [place] 2 weeks ago- has short arm cast (covered with vinyl cast cover)    Raffy received aquatic therapeutic exercises to develop strength, endurance, ROM, flexibility, posture and core stabilization for 60 minutes including:    WARMUP  Walking fwd/back/side x 2 laps    STRETCHES  HS  quad  Piriformis- R only    LE EX  Mini Squat with QS x30  Heel raise with GS x30  Single leg heel raise x10-np  Fwd step up x 30  Lateral steps up x 30 with intermittent UE support-np  Step up/down/lateral on box x 10 each with single UE support  Hip hikes x 30-np  Walking lunges in // bars x 4 laps(VC for push off)-np  Hip abd/add x 30-  Abduction walks in mini squat stance with B hip IR x 2 lap-np  Monster walks x 2 laps-np  Fwd steps up on 2 steps x 10 each-np  Step down on 2 steps x 10 each-np  Running with push off on toes x 4 laps with 30-45 sec rest between each lap  Resistive walking with PT holding orange tband  fwd/back/side x 1/2 lap x2 each direction   Modified tandem for hitting ball with tennis racket(clinton, backhand  &overhead)  X 5 minutes using RUE  Running backwards x 2 laps-VC for core stabization    Ankle stabilization:  Heel walks in // bars 2 laps-np   Toe walks in // bars with UE support 2 laps  Static standing in // bars in tandem stance 2x45 sec with each LE in the posterior position -np  Tandem walks fwd/back in // bars with occasional UE support 2 laps   Marching with 3 sec holds & focus on cocontraction of quads/hamstring with stance phase  Mini squat hold with chest press of disc x 20-np  Walking lunges with ball rotation x 2 laps  Braiding holding railing x 2 laps(LOB x2)  Staggered stance for catch & throw ball(LOB x1) 2/2 fair activation of ankle df  Seated wonder board using BUE/LE to propel fwd/back/side x 2 laps each    Orange tb for ankle pf/eversion/inversion x 15 reps-np    Core: NP  Shoulder horizontal abd/add in modified tandem with apc 30x-np  Shoulder flx/ext in modified tandem with apc 30x-np  Shoulder extensions with 1 DB submersion just below surface in long lever arm position 3 x 30 sec each   Wall sits w/ aquatic 1 aquatic weights in long lever arm position 3x30 sec NP  Static standing in mini squat position with yellow ball submersion chest press 3x10 -np    ENDURANCE  Marching x 3 min-NP    COOL DOWN  1 lap fwd/back/sideways    Manual spinal stretches for 3 x 15 sec-np  Patient brought SI stabilization belt as recommended by land PT; assisted patient in proper fit    Home Exercises Provided and Patient Education Provided     Education provided:   - knee to chest stretches & truncal rotations(HEP2Go [id number])    Written Home Exercises Provided: yes.  Exercises were reviewed and Raffy was able to demonstrate them prior to the end of the session.  Raffy demonstrated good  understanding of the education provided.     See EMR under Media for exercises provided 12/11/2018. Added  additional ex of towel scrunches & single leg stance against wall(1/7/2019)    Assessment     Pt spencer tx with ther ex well, good form, posture, core stab tech with few VCs required, Modified some exercises 2/2 LUE with cast cover.. Tolerated progression of exercises without any increases in pain Raffy is highly motivated & participatory with PT treatment &  adherent to  HEP.   Raffy is progressing well towards his goals.   Pt prognosis is Good.     Pt will continue to benefit from skilled aquatic outpatient physical therapy to address the deficits listed in the problem list box on initial evaluation, provide pt/family education and to maximize pt's level of independence in the home and community environment.     Pt's spiritual, cultural and educational needs considered and pt agreeable to plan of care and goals.    Anticipated barriers to physical therapy: none    Goals:  Short Term Goals: 4 weeks   1. Pt will be able to tolerate 20 minutes of driving reporting no more than 10% increase in back pain/ radicular sx(progresing- not met)  2. Pt will be able to ascend/descend 12 steps with one railing reporting no increase in pain in order to be able to navigate to work(progressing - not met)  3. Pt will be independent with HEP and report compliance at least 5 days/week     Long Term Goals: 8 weeks   1. Pt will demonstrate B foot MMT of at least 4/5 throughout to demonstrate improved functional mobility(progressing - not met)  2. Pt will be able to tolerate 1 hour of standing reporting no increase in sx to be able to tolerate playing stand up bass(progressing - not met)  3. Pt will demonstrate independent gait with minimal gait deviations(progressing - not met)  Plan     Plan of care Certification: 11/15/2018 to 2/15/19.     Outpatient Physical Therapy 2 times weekly for 12 weeks to include the following interventions: Dry Needling, Aquatic Therapy, Cervical/Lumbar Traction, Electrical Stimulation TENS, Gait Training,  Manual Therapy, Moist Heat/ Ice, Neuromuscular Re-ed, Orthotic Management and Training, Patient Education, Self Care, Therapeutic Activites, Therapeutic Exercise and Ultrasound.     Antonia Ca, PT

## 2019-02-14 NOTE — TELEPHONE ENCOUNTER
Please call the patient and tell him that the pharmacy sent his lithium refill to me. I think that possibly Dr. Dennis refills this. Dr. Rendon.

## 2019-02-18 ENCOUNTER — CLINICAL SUPPORT (OUTPATIENT)
Dept: REHABILITATION | Facility: HOSPITAL | Age: 69
End: 2019-02-18
Attending: NEUROLOGICAL SURGERY
Payer: COMMERCIAL

## 2019-02-18 ENCOUNTER — TELEPHONE (OUTPATIENT)
Dept: ORTHOPEDICS | Facility: CLINIC | Age: 69
End: 2019-02-18

## 2019-02-18 DIAGNOSIS — M54.16 LUMBAR BACK PAIN WITH RADICULOPATHY AFFECTING LOWER EXTREMITY: ICD-10-CM

## 2019-02-18 PROCEDURE — 97113 AQUATIC THERAPY/EXERCISES: CPT

## 2019-02-20 ENCOUNTER — TELEPHONE (OUTPATIENT)
Dept: ORTHOPEDICS | Facility: CLINIC | Age: 69
End: 2019-02-20

## 2019-02-20 ENCOUNTER — OFFICE VISIT (OUTPATIENT)
Dept: ORTHOPEDICS | Facility: CLINIC | Age: 69
End: 2019-02-20
Payer: COMMERCIAL

## 2019-02-20 ENCOUNTER — DOCUMENTATION ONLY (OUTPATIENT)
Dept: ORTHOPEDICS | Facility: CLINIC | Age: 69
End: 2019-02-20

## 2019-02-20 ENCOUNTER — HOSPITAL ENCOUNTER (OUTPATIENT)
Dept: RADIOLOGY | Facility: OTHER | Age: 69
Discharge: HOME | End: 2019-02-20
Attending: PHYSICIAN ASSISTANT
Payer: COMMERCIAL

## 2019-02-20 ENCOUNTER — CLINICAL SUPPORT (OUTPATIENT)
Dept: REHABILITATION | Facility: HOSPITAL | Age: 69
End: 2019-02-20
Attending: NEUROLOGICAL SURGERY
Payer: COMMERCIAL

## 2019-02-20 VITALS
BODY MASS INDEX: 25.61 KG/M2 | SYSTOLIC BLOOD PRESSURE: 105 MMHG | HEART RATE: 55 BPM | WEIGHT: 169 LBS | DIASTOLIC BLOOD PRESSURE: 76 MMHG | HEIGHT: 68 IN

## 2019-02-20 DIAGNOSIS — S52.502A CLOSED FRACTURE OF DISTAL END OF LEFT RADIUS, UNSPECIFIED FRACTURE MORPHOLOGY, INITIAL ENCOUNTER: ICD-10-CM

## 2019-02-20 DIAGNOSIS — S52.502A CLOSED FRACTURE OF DISTAL END OF LEFT RADIUS, UNSPECIFIED FRACTURE MORPHOLOGY, INITIAL ENCOUNTER: Primary | ICD-10-CM

## 2019-02-20 DIAGNOSIS — M54.16 LUMBAR BACK PAIN WITH RADICULOPATHY AFFECTING LOWER EXTREMITY: ICD-10-CM

## 2019-02-20 PROCEDURE — 99999 PR PBB SHADOW E&M-EST. PATIENT-LVL IV: CPT | Mod: PBBFAC,,, | Performed by: PHYSICIAN ASSISTANT

## 2019-02-20 PROCEDURE — 97113 AQUATIC THERAPY/EXERCISES: CPT

## 2019-02-20 PROCEDURE — 29085 PR APPLY HAND/WRIST CAST: ICD-10-PCS | Mod: LT,S$GLB,, | Performed by: PHYSICIAN ASSISTANT

## 2019-02-20 PROCEDURE — 73110 X-RAY EXAM OF WRIST: CPT | Mod: 26,LT,, | Performed by: RADIOLOGY

## 2019-02-20 PROCEDURE — 1101F PT FALLS ASSESS-DOCD LE1/YR: CPT | Mod: CPTII,S$GLB,, | Performed by: PHYSICIAN ASSISTANT

## 2019-02-20 PROCEDURE — 73110 X-RAY EXAM OF WRIST: CPT | Mod: TC,FY,LT

## 2019-02-20 PROCEDURE — 99213 PR OFFICE/OUTPT VISIT, EST, LEVL III, 20-29 MIN: ICD-10-PCS | Mod: 25,S$GLB,, | Performed by: PHYSICIAN ASSISTANT

## 2019-02-20 PROCEDURE — 99213 OFFICE O/P EST LOW 20 MIN: CPT | Mod: 25,S$GLB,, | Performed by: PHYSICIAN ASSISTANT

## 2019-02-20 PROCEDURE — 29085 APPL CAST HAND&LWR FOREARM: CPT | Mod: LT,S$GLB,, | Performed by: PHYSICIAN ASSISTANT

## 2019-02-20 PROCEDURE — 1101F PR PT FALLS ASSESS DOC 0-1 FALLS W/OUT INJ PAST YR: ICD-10-PCS | Mod: CPTII,S$GLB,, | Performed by: PHYSICIAN ASSISTANT

## 2019-02-20 PROCEDURE — 99999 PR PBB SHADOW E&M-EST. PATIENT-LVL IV: ICD-10-PCS | Mod: PBBFAC,,, | Performed by: PHYSICIAN ASSISTANT

## 2019-02-20 PROCEDURE — 73110 XR WRIST COMPLETE 3 VIEWS LEFT: ICD-10-PCS | Mod: 26,LT,, | Performed by: RADIOLOGY

## 2019-02-20 NOTE — PROGRESS NOTES
Physical Therapy Daily Treatment Note     Name: Raffy Rutherford Jr.  Clinic Number: [id number]    Therapy Diagnosis:   Low back pain  Physician: Jason Caldwell MD    Visit Date: 2/20/2019  Physician Orders: PT Eval and Treat Low back pain w/ bilat radiculopathy  Medical Diagnosis from Referral: SI joint dysfunction, s/p lumbar fusion, s/p R SHARAD, polyneuropathy  Evaluation Date: 11/15/2018  Authorization Period Expiration: 12/31/2019  Plan of Care Expiration: 4/8/19  Visit # / Visits authorized: 9/30    Time In: 1300  Time Out: 1400  Total Billable Time:60 minutes    Precautions: Standard    Subjective     Pt reports: 3/10 back using SI belt for support  He was compliant with home exercise program.  Response to previous treatment: soreness x 1 day   Functional change: using B AFO less with mobility    Pain: 1/10 back, 3/10 legs  Location: Back & legs    Objective   Raffy sustained L distal radius fx while on vacation in [place] 2 weeks ago- has short arm cast (covered with vinyl cast cover)    Rafyf received aquatic therapeutic exercises to develop strength, endurance, ROM, flexibility, posture and core stabilization for 60 minutes including:    WARMUP  Walking fwd/back/side x 2 laps    STRETCHES  HS  quad  Piriformis- R only    LE EX 15oz weights  Mini Squat with QS x30  Heel raise with GS x30  Single leg heel raise x10-np  Fwd step up x 30-np  Lateral steps up x 30 with intermittent UE support-np  Step up/down/lateral on box x 10 each with single UE support  Hip hikes x 30-np  Walking lunges in // bars x 4 laps(VC for push off)  Hip abd/add x 30-np  Abduction walks in mini squat stance with B hip IR x 2 lap  Monster walks x 2 laps  Fwd steps up on 2 steps x 10 each  Step down on 2 steps x 10 each  Running with push off on toes x 4 laps with 30-45 sec rest between each lap-np  Resistive walking with PT holding orange tband fwd/back/side x 1/2 lap x3 each direction   Modified tandem for hitting ball with tennis  racket(clinton, backhand  &overhead)  X 5 minutes using RUE-np  Running backwards x 2 laps-VC for core stabization- np  Step up & over box x 20 each LE  Step up lat onto box x 20 each LE    Ankle stabilization:  Heel walks in // bars 2 laps   Toe walks in // bars with UE support 2 laps  Static standing in // bars in tandem stance 2x45 sec with each LE in the posterior position -np  Tandem walks fwd/back in // bars with occasional UE support 2 laps   Marching with 3 sec holds & focus on cocontraction of quads/hamstring with stance phase-np  Mini squat hold with chest press of disc x 20-np  Walking lunges with ball rotation x 2 laps-np  Braiding // bars x 2 laps (no external support)  Staggered stance for catch & throw ball(LOB x1) 2/2 fair activation of ankle df-np  Seated wonder board using BUE/LE to propel fwd/back/side x 2 laps each  Wobble board for fwd/back & laterally x20 each direction  Wobble board for clockwise & ccw x 20 each direction with addition of LE weights  Seated wonderboard for propelling with heels backward & toes fwd x 2 laps each    Orange tb for ankle pf/eversion/inversion x 15 reps-np    Core: NP  Shoulder horizontal abd/add in modified tandem with apc 30x-np  Shoulder flx/ext in modified tandem with apc 30x-np  Shoulder extensions with 1 DB submersion just below surface in long lever arm position 3 x 30 sec each   Wall sits w/ aquatic 1 aquatic weights in long lever arm position 3x30 sec NP  Static standing in mini squat position with yellow ball submersion chest press 3x10 -np    ENDURANCE  Marching x 3 min-NP    COOL DOWN  1 lap fwd/back/sideways    Manual spinal stretches for 3 x 15 sec-np  Patient brought SI stabilization belt as recommended by land PT; assisted patient in proper fit    Home Exercises Provided and Patient Education Provided     Education provided:   - knee to chest stretches & truncal rotations(HEP2Go [id number])    Written Home Exercises Provided: yes.  Exercises were  reviewed and Raffy was able to demonstrate them prior to the end of the session.  Raffy demonstrated good  understanding of the education provided.     See EMR under Media for exercises provided 12/11/2018. Added additional ex of towel scrunches & single leg stance against wall(1/7/2019)     Assessment     Pt spencer tx with ther ex well, good form, posture, core stab tech with few VCs required, Modified some exercises 2/2 LUE with cast cover.. Tolerated progression of exercises with addition of LE weights without any increases in pain. Raffy is highly motivated & participatory with PT treatment &  adherent to  HEP. Goals remain appropriate  Raffy is progressing well towards his goals.   Pt prognosis is Good.     Pt will continue to benefit from skilled aquatic outpatient physical therapy to address the deficits listed in the problem list box on initial evaluation, provide pt/family education and to maximize pt's level of independence in the home and community environment.     Pt's spiritual, cultural and educational needs considered and pt agreeable to plan of care and goals.    Anticipated barriers to physical therapy: none    Goals:  Short Term Goals: 4 weeks   1. Pt will be able to tolerate 20 minutes of driving reporting no more than 10% increase in back pain/ radicular sx(progresing- not met)  2. Pt will be able to ascend/descend 12 steps with one railing reporting no increase in pain in order to be able to navigate to work(progressing - not met)  3. Pt will be independent with HEP and report compliance at least 5 days/week(MET 2/18/2019)     Long Term Goals: 8 weeks   1. Pt will demonstrate B foot MMT of at least 4/5 throughout to demonstrate improved functional mobility(progressing - not met)  2. Pt will be able to tolerate 1 hour of standing reporting no increase in sx to be able to tolerate playing stand up bass(progressing - not met)  3. Pt will demonstrate independent gait with minimal gait  deviations(progressing - not met)  Plan     Plan of care Certification: 11/15/2018 to 2/15/19.     Outpatient Physical Therapy 2 times weekly for 12 weeks to include the following interventions: Dry Needling, Aquatic Therapy, Cervical/Lumbar Traction, Electrical Stimulation TENS, Gait Training, Manual Therapy, Moist Heat/ Ice, Neuromuscular Re-ed, Orthotic Management and Training, Patient Education, Self Care, Therapeutic Activites, Therapeutic Exercise and Ultrasound.     Antonia Ca, PT

## 2019-02-20 NOTE — TELEPHONE ENCOUNTER
----- Message from Karlie Denny MA sent at 2/20/2019 11:17 AM CST -----  Good morning Sade, can you please schedule this pt for an appointment with Ste[facility] for the [facility].  Thanks Karlie

## 2019-02-20 NOTE — PROGRESS NOTES
Subjective:      Patient ID: Raffy Rutherford Jr. is a 68 y.o. male.    Chief Complaint: Pain of the Left Wrist      HPI  Raffy Rutherford Jr. is a right hand dominant 68 y.o. male presenting today for follow up of left distal radius fracture.  He has been in a cast for the past 2 weeks.  He reports that his pain is 4/10.  His pain has increased slightly today since taking the cast off for x-ray.  Pain is mildly improved with use of Norco, he is not taking this every day.  There was a history of trauma, he tripped on a raised door stop/ledge while on vacation in [place].  Onset of symptoms began 1 week ago.  He did see a physician in [facility].  Ultrasound was performed and a fracture was noted, he was provided with a wrist brace and a IM injection of Voltaren and a steroid.    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         Review of patient's allergies indicates:   Allergen Reactions    Alphagan [brimonidine]      Patient taking MASO-B Selective Inhibitor Selegiline (Emsam)    Coumadin [warfarin]      itch    Oxycodone      hiccups         Current Outpatient Medications   Medication Sig Dispense Refill    butalbital-acetaminophen-caffeine -40 mg (FIORICET, ESGIC) -40 mg per tablet Take 1 tablet by mouth daily as needed for Pain. headache 30 tablet 3    clonazePAM (KLONOPIN) 0.5 MG tablet Take 1 tablet (0.5 mg total) by mouth 2 (two) times daily as needed for Anxiety. 60 tablet 2    HYDROcodone-acetaminophen (NORCO) 5-325 mg per tablet Take 1 tablet by mouth every 4 to 6 hours as needed for Pain (moderate to severe). 42 tablet 0    lamoTRIgine (LAMICTAL) 200 MG tablet Take one tablet by mouth once daily 90 tablet 3    lithium (ESKALITH) 300 MG capsule Take 150 mg by mouth 2 (two) times daily.      lithium (LITHOTAB) 300 mg tablet Take 1 tablet by mouth at bedtime 30 tablet 11    pravastatin  (PRAVACHOL) 40 MG tablet Take 1 tablet (40 mg total) by mouth once daily. 90 tablet 3    RABEprazole (ACIPHEX) 20 mg tablet Take 1 tablet (20 mg total) by mouth 2 (two) times daily. 180 tablet 3    selegiline (EMSAM) 12 mg/24 hr Place 1 patch onto the skin once daily. 90 patch 3    selegiline (EMSAM) 12 mg/24 hr Place onto the skin once daily. 90 patch 3    senna-docusate 8.6-50 mg (PERICOLACE) 8.6-50 mg per tablet Take 2 tablets by mouth nightly as needed for Constipation.      sucralfate (CARAFATE) 1 gram tablet as needed.       traZODone (DESYREL) 100 MG tablet Take one tablet by mouth every night at bedtime 90 tablet 3    VOLTAREN 1 % Gel        No current facility-administered medications for this visit.        Past Medical History:   Diagnosis Date    Adenomatous polyp 2003    Adenomatous polyp     Allergy     Arthritis     Back pain     after trauma beginning in 195    Cataract     Chronic pain     neck and left shoulder    Cluster headache 2013    Colon polyp     Degenerative disc disease     Depression     Diverticulitis 12/2013    Elevated PSA     Fibromyalgia 2013    GERD (gastroesophageal reflux disease)     Hepatitis 1970's    A    History of prostate biopsy 2002    Hyperlipidemia     Joint pain     Sleep apnea     Special screening for malignant neoplasms, colon 5/5/2014    Thyroid nodule 7/16/2014    Visual disturbance 2012    problems after cataract surgery       Past Surgical History:   Procedure Laterality Date    BACK SURGERY      BLEPHAROPLASTY UPPER LID Bilateral 2/10/2015    Performed by Rhett Lainez III, MD at [facility] OR 2ND FLR    BLOCK, GANGLION IMPAR N/A 6/25/2013    [facility]    CATARACT EXTRACTION W/  INTRAOCULAR LENS IMPLANT Bilateral     CHOLECYSTECTOMY      COLONOSCOPY N/A 5/5/2014    Performed by Pedro Carlos MD at [facility] END[facility] (4TH FLR)    COSMETIC SURGERY  2/10/2015    Direct mid-forehead brow lift     "COSMETIC SURGERY  2/10/2015    Bilateral upper lid blepahroplasty    ESOPHAGOGASTRODUODENOSCOPY (EGD) N/A 4/28/2015    Performed by [facility]adou Hardin [facility] at [facility] (4TH FLR)    EYE SURGERY      FUSION EXTREME LATERAL N/A 6/22/2015    Per[facility]ad Browne [facility] at [facility] OR 2ND FLR    HEMORRHOID SURGERY      with complication of chronic bleeding for 6 weeks     INJECTION, STEROID Right SI Joint Block and Steroid Injection Right 12/6/2018    [facility]dwell [facility] at [facility]    JOINT REPLACEMENT      KNEE SURGERY      involving arthroscopic surgery to both knees    LIFT-BROW midforehead direct Bilateral 2/10/2015    Performed by Rhett [facility]z [facility] at [facility] OR 2ND FLR    SINUS SURGERY      SINUS SURGERY      left molar and sinus surgery for trigeminal neuralgia    SPINAL FUSION  6/22/2015    L3-L5 XLIF    SPINE SURGERY  6/22/15    XLIF/TANA    TOTAL HIP ARTHROPLASTY  4/2012    Pt states he had total hip replacement on his left hip.         Review of Systems:  Review of Systems   Constitution: Negative for chills and fever.   Skin: Negative for rash and suspicious lesions.   Musculoskeletal:        See HPI   Neurological: Negative for dizziness, headaches, light-headedness, numbness and paresthesias.   Psychiatric/Behavioral: Negative for depression. The patient is not nervous/anxious.          OBJECTIVE:     PHYSICAL EXAM:  Height: 5' 8" (172.7 cm) Weight: 76.7 kg (169 lb)  Vitals:    02/20/19 1025   BP: 105/76   Pulse: (!) 55   Weight: 76.7 kg (169 lb)   Height: 5' 8" (1.727 m)   PainSc:   4     General    Vitals reviewed.  Constitutional: He is oriented to person, place, and time. He appears well-developed and well-nourished.   HENT:   Head: Normocephalic and atraumatic.   Neck: Normal range of motion.   Cardiovascular: Normal rate.    Pulmonary/Chest: Effort normal. No respiratory distress.   Neurological: He is alert and oriented to person, place, and time.   Psychiatric: He has a " normal mood and affect. His behavior is normal. Judgment and thought content normal.             Musculoskeletal: Mild edema noted over the left hand and wrist, mild ecchymosis volarly at the left wrist. No lacerations or abrasions.  Mildly tender palpation over the left anatomical snuffbox, moderately tender at the left radiocarpal joint.  Good finger range of motion left hand.  Wrist motion not assessed today due to immobilization.  Neurovascularly intact-good sensation and motor function, good capillary refill, 2+ radial pulses.      RADIOGRAPHS:  Left Wrist X-Ray, 2/20/19  FINDINGS:  There is a nondisplaced fracture of the distal radius, unchanged from prior study.  Cartilage spaces are maintained. Soft tissues are unremarkable.      Impression     As above.     Comments: I have personally reviewed the imaging and I agree with the above radiologist's report.    ASSESSMENT/PLAN:   Raffy was seen today for pain.    Diagnoses and all orders for this visit:    Closed fracture of distal end of left radius, unspecified fracture morphology, initial encounter  -     X-Ray Wrist Complete Left; Future  -     Ambulatory Referral to Physical/Occupational Therapy           - We talked at length about the anatomy and pathophysiology of   Encounter Diagnosis   Name Primary?    Closed fracture of distal end of left radius, unspecified fracture morphology, initial encounter Yes       - x-ray imaging reviewed with the patient.   - patient placed in a left forearm cast today, fiberglass.  - Follow up in 2 weeks with X-Ray  - Schedule with fracture clinic  - Orders for OT, start after next visit  - Norco 5/325 mg 1 tab po every 4-6 hours as needed for moderate to severe pain; Tylenol for mild pain; discussed max acetaminophen in 24 hours    Disclaimer: This note has been generated using voice-recognition software. There may be typographical errors that have been missed during proof-reading.

## 2019-02-20 NOTE — TELEPHONE ENCOUNTER
----- Message from Alana Tapia sent at 2/20/2019  4:13 PM CST -----  Contact: Self/ [phone]  Patient would like a call back to speak with someone about his bone Density appt. Patient would like a text message or my [facility] with his appt.

## 2019-02-25 ENCOUNTER — OFFICE VISIT (OUTPATIENT)
Dept: ORTHOPEDICS | Facility: CLINIC | Age: 69
End: 2019-02-25
Payer: COMMERCIAL

## 2019-02-25 ENCOUNTER — LAB VISIT (OUTPATIENT)
Dept: LAB | Facility: HOSPITAL | Age: 69
End: 2019-02-25
Payer: COMMERCIAL

## 2019-02-25 ENCOUNTER — CLINICAL SUPPORT (OUTPATIENT)
Dept: REHABILITATION | Facility: HOSPITAL | Age: 69
End: 2019-02-25
Attending: NEUROLOGICAL SURGERY
Payer: COMMERCIAL

## 2019-02-25 VITALS
DIASTOLIC BLOOD PRESSURE: 69 MMHG | BODY MASS INDEX: 26.13 KG/M2 | WEIGHT: 171.88 LBS | HEART RATE: 62 BPM | SYSTOLIC BLOOD PRESSURE: 114 MMHG

## 2019-02-25 DIAGNOSIS — M81.6 LOCALIZED OSTEOPOROSIS OF LEQUESNE: ICD-10-CM

## 2019-02-25 DIAGNOSIS — M80.80XA PATHOLOGICAL FRACTURE DUE TO OSTEOPOROSIS, UNSPECIFIED FRACTURE SITE, UNSPECIFIED OSTEOPOROSIS TYPE, INITIAL ENCOUNTER: ICD-10-CM

## 2019-02-25 DIAGNOSIS — M81.0 OSTEOPOROSIS, UNSPECIFIED OSTEOPOROSIS TYPE, UNSPECIFIED PATHOLOGICAL FRACTURE PRESENCE: ICD-10-CM

## 2019-02-25 DIAGNOSIS — M80.80XA PATHOLOGICAL FRACTURE DUE TO OSTEOPOROSIS, UNSPECIFIED FRACTURE SITE, UNSPECIFIED OSTEOPOROSIS TYPE, INITIAL ENCOUNTER: Primary | ICD-10-CM

## 2019-02-25 DIAGNOSIS — M54.16 LUMBAR BACK PAIN WITH RADICULOPATHY AFFECTING LOWER EXTREMITY: ICD-10-CM

## 2019-02-25 LAB
25(OH)D3+25(OH)D2 SERPL-MCNC: 13 NG/ML
ALBUMIN SERPL BCP-MCNC: 4.1 G/DL
ALP SERPL-CCNC: 51 U/L
ALT SERPL W/O P-5'-P-CCNC: 28 U/L
ANION GAP SERPL CALC-SCNC: 8 MMOL/L
AST SERPL-CCNC: 22 U/L
BILIRUB SERPL-MCNC: 0.4 MG/DL
BUN SERPL-MCNC: 16 MG/DL
CALCIUM SERPL-MCNC: 10 MG/DL
CHLORIDE SERPL-SCNC: 100 MMOL/L
CO2 SERPL-SCNC: 30 MMOL/L
CREAT SERPL-MCNC: 0.8 MG/DL
EST. GFR  (AFRICAN AMERICAN): >60 ML/MIN/1.73 M^2
EST. GFR  (NON AFRICAN AMERICAN): >60 ML/MIN/1.73 M^2
GLUCOSE SERPL-MCNC: 79 MG/DL
MAGNESIUM SERPL-MCNC: 2.4 MG/DL
POTASSIUM SERPL-SCNC: 3.8 MMOL/L
PROT SERPL-MCNC: 7.6 G/DL
PTH-INTACT SERPL-MCNC: 51 PG/ML
SODIUM SERPL-SCNC: 138 MMOL/L
T4 FREE SERPL-MCNC: 0.89 NG/DL
TSH SERPL DL<=0.005 MIU/L-ACNC: 0.39 UIU/ML

## 2019-02-25 PROCEDURE — 99999 PR PBB SHADOW E&M-EST. PATIENT-LVL III: ICD-10-PCS | Mod: PBBFAC,,, | Performed by: PHYSICIAN ASSISTANT

## 2019-02-25 PROCEDURE — 83735 ASSAY OF MAGNESIUM: CPT

## 2019-02-25 PROCEDURE — 84439 ASSAY OF FREE THYROXINE: CPT

## 2019-02-25 PROCEDURE — 99999 PR PBB SHADOW E&M-EST. PATIENT-LVL III: CPT | Mod: PBBFAC,,, | Performed by: PHYSICIAN ASSISTANT

## 2019-02-25 PROCEDURE — 84443 ASSAY THYROID STIM HORMONE: CPT

## 2019-02-25 PROCEDURE — 99214 OFFICE O/P EST MOD 30 MIN: CPT | Mod: 25,S$GLB,, | Performed by: PHYSICIAN ASSISTANT

## 2019-02-25 PROCEDURE — 1101F PT FALLS ASSESS-DOCD LE1/YR: CPT | Mod: CPTII,S$GLB,, | Performed by: PHYSICIAN ASSISTANT

## 2019-02-25 PROCEDURE — 36415 COLL VENOUS BLD VENIPUNCTURE: CPT

## 2019-02-25 PROCEDURE — 82306 VITAMIN D 25 HYDROXY: CPT

## 2019-02-25 PROCEDURE — 83970 ASSAY OF PARATHORMONE: CPT

## 2019-02-25 PROCEDURE — 80053 COMPREHEN METABOLIC PANEL: CPT

## 2019-02-25 PROCEDURE — 1101F PR PT FALLS ASSESS DOC 0-1 FALLS W/OUT INJ PAST YR: ICD-10-PCS | Mod: CPTII,S$GLB,, | Performed by: PHYSICIAN ASSISTANT

## 2019-02-25 PROCEDURE — 97113 AQUATIC THERAPY/EXERCISES: CPT

## 2019-02-25 PROCEDURE — 99214 PR OFFICE/OUTPT VISIT, EST, LEVL IV, 30-39 MIN: ICD-10-PCS | Mod: 25,S$GLB,, | Performed by: PHYSICIAN ASSISTANT

## 2019-02-25 NOTE — PROGRESS NOTES
Physical Therapy Daily Treatment Note     Name: Raffy Rutherford Jr.  Clinic Number: [id number]    Therapy Diagnosis:   Low back pain  Physician: Jason Caldwell MD    Visit Date: 2/25/2019  Physician Orders: PT Eval and Treat Low back pain w/ bilat radiculopathy  Medical Diagnosis from Referral: SI joint dysfunction, s/p lumbar fusion, s/p R SHARAD, polyneuropathy  Evaluation Date: 11/15/2018  Authorization Period Expiration: 12/31/2019  Plan of Care Expiration: 4/8/19  Visit # / Visits authorized: 10/30    Time In: 0800  Time Out: 0900  Total Billable Time:60 minutes    Precautions: Standard    Subjective     Pt reports: 3/10 back using SI belt for support  He was compliant with home exercise program.  Response to previous treatment: soreness x 1 day in B quads  Functional change: using B AFO less with mobility    Pain: 0/10 back,3 /10 legs  Location: Back & legs    Objective   Raffy sustained L distal radius fx while on vacation in [place] 1/2018 - has short arm cast (covered with vinyl cast cover during aqua therapy)    Raffy received aquatic therapeutic exercises to develop strength, endurance, ROM, flexibility, posture and core stabilization for 60 minutes including:    WARMUP  Walking fwd/back/side x 2 laps    STRETCHES  HS  quad  Piriformis- R only    LE EX 15oz weights  Mini Squat with QS x30  Heel raise with GS x30  Single leg heel raise x10-np  Fwd step up x 30-np  Lateral steps up x 30 with intermittent UE support-np  Step up/down/lateral on box x 10 each with single UE support  Hip hikes x 30-np  Walking lunges in // bars x 4 laps(VC for push off)  Hip abd/add x 30-np  Abduction walks in mini squat stance with B hip IR x 2 lap  Monster walks x 2 laps  Fwd steps up on 2 steps x 10 each  Step down on 2 steps x 10 each  Running with push off on toes x 4 laps with 30-45 sec rest between each lap-np  Resistive walking with PT holding orange tband fwd/back/side x 1/2 lap x3 each direction   Modified tandem for  hitting ball with tennis racket(clinton, backhand  &overhead)  X 5 minutes using RUE-np  Running backwards x 2 laps-VC for core stabization- np  Step up & over box x 20 each LE  Step up lat onto box x 20 each LE  Obliques:Opposite elbow to knee  x15 each  Resistive walking using jogging belt with red cord  X 6    Ankle stabilization:  Heel walks in // bars 2 laps   Toe walks in // bars with UE support 2 laps  Static standing in // bars in tandem stance 2x45 sec with each LE in the posterior position -np  Tandem walks fwd/back in // bars with occasional UE support 2 laps   Marching with 3 sec holds & focus on cocontraction of quads/hamstring with stance phase-np  Mini squat hold with chest press of disc x 20-np  Walking lunges with ball rotation x 2 laps-np  Braiding // bars x 2 laps (no external support)  Staggered stance for catch & throw ball(LOB x1) 2/2 fair activation of ankle df-np  Seated wonder board using BUE/LE to propel fwd/back/side x 2 laps each  Seated wonder board for propel laterally  Wobble board for fwd/back & laterally x20 each direction  Wobble board for clockwise & ccw x 20 each direction with addition of LE weights  Seated wonderboard for propelling with heels backward & toes fwd x 2 laps each    Orange tb for ankle pf/eversion/inversion x 15 reps-np    Core: NP  Shoulder horizontal abd/add in modified tandem with apc 30x-np  Shoulder flx/ext in modified tandem with apc 30x-np  Shoulder extensions with 1 DB submersion just below surface in long lever arm position 3 x 30 sec each   Wall sits w/ aquatic 1 aquatic weights in long lever arm position 3x30 sec NP  Static standing in mini squat position with yellow ball submersion chest press 3x10 -np    ENDURANCE  Marching x 3 min-NP    COOL DOWN  1 lap fwd/back/sideways    Manual spinal stretches for 3 x 15 sec-np  Patient brought SI stabilization belt as recommended by land PT; assisted patient in proper fit    Home Exercises Provided and Patient  Education Provided     Education provided:   - knee to chest stretches & truncal rotations(HEP2Go [id number])    Written Home Exercises Provided: yes.  Exercises were reviewed and Raffy was able to demonstrate them prior to the end of the session.  Raffy demonstrated good  understanding of the education provided.     See EMR under Media for exercises provided 12/11/2018. Added additional ex of towel scrunches & single leg stance against wall(1/7/2019)     Assessment     Pt spencer tx with ther ex well, good form, posture, core stab tech with few VCs required, Modified some exercises 2/2 LUE with cast cover.. Tolerated progression of exercises with addition of resistive walking without any increases in pain. Raffy is highly motivated & participatory with PT treatment &  adherent to  HEP. Goals remain appropriate  Raffy is progressing well towards his goals.   Pt prognosis is Good.     Pt will continue to benefit from skilled aquatic outpatient physical therapy to address the deficits listed in the problem list box on initial evaluation, provide pt/family education and to maximize pt's level of independence in the home and community environment.     Pt's spiritual, cultural and educational needs considered and pt agreeable to plan of care and goals.    Anticipated barriers to physical therapy: none    Goals:  Short Term Goals: 4 weeks   1. Pt will be able to tolerate 20 minutes of driving reporting no more than 10% increase in back pain/ radicular sx(progresing- not met)  2. Pt will be able to ascend/descend 12 steps with one railing reporting no increase in pain in order to be able to navigate to work(progressing - not met)  3. Pt will be independent with HEP and report compliance at least 5 days/week(MET 2/18/2019)     Long Term Goals: 8 weeks   1. Pt will demonstrate B foot MMT of at least 4/5 throughout to demonstrate improved functional mobility(progressing - not met)  2. Pt will be able to tolerate 1 hour of  standing reporting no increase in sx to be able to tolerate playing stand up bass(progressing - not met)  3. Pt will demonstrate independent gait with minimal gait deviations(progressing - not met)  Plan     Plan of care Certification: 11/15/2018 to 2/15/19.     Outpatient Physical Therapy 2 times weekly for 12 weeks to include the following interventions: Dry Needling, Aquatic Therapy, Cervical/Lumbar Traction, Electrical Stimulation TENS, Gait Training, Manual Therapy, Moist Heat/ Ice, Neuromuscular Re-ed, Orthotic Management and Training, Patient Education, Self Care, Therapeutic Activites, Therapeutic Exercise and Ultrasound.     Antonia Ca, PT

## 2019-02-25 NOTE — PROGRESS NOTES
SUBJECTIVE:     Chief Complaint & History of Present Illness:  Raffy Rutherford Jr. is a new patient 68 y.o. male who is seen here today for a bone health evaluation for osteoporosis, fracture prevention, and risk evaluation for future fractures.        he was appropriately identified and referred by Lina Daigle PA due to concerns for compromised bone quality, and risk of future fractures.  This visit is medically necessary to identify risk, investigate and initiative treatment as appropriate to improve bone quality and strength for the reduction of secondary fractures.     his medical history, medications and fracture history will be reviewed and summarized      Review of patient's allergies indicates:   Allergen Reactions    Alphagan [brimonidine]      Patient taking MASO-B Selective Inhibitor Selegiline (Emsam)    Coumadin [warfarin]      itch    Oxycodone      hiccups         Current Outpatient Medications   Medication Sig Dispense Refill    butalbital-acetaminophen-caffeine -40 mg (FIORICET, ESGIC) -40 mg per tablet Take 1 tablet by mouth daily as needed for Pain. headache 30 tablet 3    clonazePAM (KLONOPIN) 0.5 MG tablet Take 1 tablet (0.5 mg total) by mouth 2 (two) times daily as needed for Anxiety. 60 tablet 2    HYDROcodone-acetaminophen (NORCO) 5-325 mg per tablet Take 1 tablet by mouth every 4 to 6 hours as needed for Pain (moderate to severe). 42 tablet 0    lamoTRIgine (LAMICTAL) 200 MG tablet Take one tablet by mouth once daily 90 tablet 3    lithium (ESKALITH) 300 MG capsule Take 150 mg by mouth 2 (two) times daily.      lithium (LITHOTAB) 300 mg tablet Take 1 tablet by mouth at bedtime 30 tablet 11    pravastatin (PRAVACHOL) 40 MG tablet Take 1 tablet (40 mg total) by mouth once daily. 90 tablet 3    RABEprazole (ACIPHEX) 20 mg tablet Take 1 tablet (20 mg total) by mouth 2 (two) times daily. 180 tablet 3    selegiline (EMSAM) 12 mg/24 hr Place 1 patch onto the skin  once daily. 90 patch 3    selegiline (EMSAM) 12 mg/24 hr Place onto the skin once daily. 90 patch 3    senna-docusate 8.6-50 mg (PERICOLACE) 8.6-50 mg per tablet Take 2 tablets by mouth nightly as needed for Constipation.      sucralfate (CARAFATE) 1 gram tablet as needed.       traZODone (DESYREL) 100 MG tablet Take one tablet by mouth every night at bedtime 90 tablet 3    VOLTAREN 1 % Gel        No current facility-administered medications for this visit.        Past Medical History:   Diagnosis Date    Adenomatous polyp 2003    Adenomatous polyp     Allergy     Arthritis     Back pain     after trauma beginning in 195    Cataract     Chronic pain     neck and left shoulder    Cluster headache 2013    Colon polyp     Degenerative disc disease     Depression     Diverticulitis 12/2013    Elevated PSA     Fibromyalgia 2013    GERD (gastroesophageal reflux disease)     Hepatitis 1970's    A    History of prostate biopsy 2002    Hyperlipidemia     Joint pain     Sleep apnea     Special screening for malignant neoplasms, colon 5/5/2014    Thyroid nodule 7/16/2014    Visual disturbance 2012    problems after cataract surgery       Past Surgical History:   Procedure Laterality Date    BACK SURGERY      BLEPHAROPLASTY UPPER LID Bilateral 2/10/2015    Performed by Rhett Lainez III, [facility] at [facility] OR 2ND FLR    BLOCK, GANGLION IMPAR N/A 6/25/2013    [facility]    CATARACT EXTRACTION W/  INTRAOCULAR LENS IMPLANT Bilateral     CHOLECYSTECTOMY      COLONOSCOPY N/A 5/5/2014    Performed by Pedro Carlos MD at [facility] ENDO (4TH FLR)    COSMETIC SURGERY  2/10/2015    Direct mid-forehead brow lift    COSMETIC SURGERY  2/10/2015    Bilateral upper lid blepahroplasty    ESOPHAGOGASTRODUODENOSCOPY (EGD) N/A 4/28/2015    Performed by Amadou Hardin MD at [facility] ENDO (4TH FLR)    EYE SURGERY      FUSION EXTREME LATERAL N/A 6/22/2015    Performed by Milad  MD Pavan at [facility] OR 2ND F[facility]    HEMORRHOID SURGERY      with complication of chronic bleeding for 6 weeks     INJECTION, STEROID Right SI Joint Block and Steroid Injection Right 12/6/2018    [facility]dwell [facility] OR    JOINT REPLACEMENT      KNEE SURGERY      involving arthroscopic surgery to both knees    LIFT-BROW midforehead direct Bilateral 2/10/2015    Performed by Rhett Lainez III, MD at [facility] OR 2ND FLR    SINUS SURGERY      SINUS SURGERY      left molar and sinus surgery for trigeminal neuralgia    SPINAL FUSION  6/22/2015    L3-L5 XLIF    SPINE SURGERY  6/22/15    XLIF/PAVAN    TOTAL HIP ARTHROPLASTY  4/2012    Pt states he had total hip replacement on his left hip.       Lab Results   Component Value Date     02/25/2019    K 3.8 02/25/2019     02/25/2019    CO2 30 (H) 02/25/2019    GLU 79 02/25/2019    BUN 16 02/25/2019    CREATININE 0.8 02/25/2019    CALCIUM 10.0 02/25/2019    PROT 7.6 02/25/2019    ALBUMIN 4.1 02/25/2019    BILITOT 0.4 02/25/2019    ALKPHOS 51 (L) 02/25/2019    AST 22 02/25/2019    ALT 28 02/25/2019    ANIONGAP 8 02/25/2019    ESTGFRAFRICA >60.0 02/25/2019    EGFRNONAA >60.0 02/25/2019      Lab Results   Component Value Date    WBC 11.20 12/17/2018    RBC 5.31 12/17/2018    HGB 16.8 12/17/2018    HCT 50.6 12/17/2018    MCV 95 12/17/2018    MCH 31.6 (H) 12/17/2018    MCHC 33.2 12/17/2018    RDW 12.3 12/17/2018     12/17/2018    MPV 9.1 (L) 12/17/2018    GRAN 10.3 (H) 12/17/2018    GRAN 91.6 (H) 12/17/2018    LYMPH 0.3 (L) 12/17/2018    LYMPH 2.9 (L) 12/17/2018    MONO 0.5 12/17/2018    MONO 4.5 12/17/2018    EOS 0.0 12/17/2018    BASO 0.02 12/17/2018    EOSINOPHIL 0.0 12/17/2018    BASOPHIL 0.2 12/17/2018    DIFFMETHOD Automated 12/17/2018      Lab Results   Component Value Date    MG 2.4 02/25/2019      No results found for: PHOS   Lab Results   Component Value Date    PBNDKLOQ70XC 13 (L) 02/25/2019      Lab Results   Component  Value Date    PTH 51.0 02/25/2019      Lab Results   Component Value Date    TSH 0.393 (L) 02/25/2019      Lab Results   Component Value Date    FREET4 0.89 02/25/2019      Lab Results   Component Value Date    HGBA1C 5.5 10/15/2018    ESTIMATEDAVG 111 10/15/2018      No results found for: FREETESTOSTE         Vital Signs (Most Recent)  Vitals:    02/25/19 1442   BP: 114/69   Pulse: 62         Today's Visit and Bone Health History     Have you had any loss of height or gotten shorter since your 20's?  1   Are you postmenopausal?  Not Menopausal   Have you ever been told you have low testosterone? No   Have you ever taken any type of hormone replacement therapy? No   If you have had hormone replacement therapy please indicate which hormone therapy was used and for how long. na   Do you currently smoke? No   Have you ever smoked in the past? No   How many alcoholic beverages do you drink per day? 1 to 3   How many caffeinated beverages do you drink per day? 1 to 3   Have you had 2 or more falls in the past 12 months? Yes   How active have you been in the past 12 months? Somewhat active ( walk up to 1 mile per day )   Did either of your parents have a hip fracture after the age of 50? No   Have you ever been diagnosed with any of the following? Gastroesophageal Reflux   Do you currently have a fracture? Yes   If you currently have a fracture please indicate where and when the fracture occured. lt. wrist 3 wks ago   Have you broken any other bones since you turned 52 or older? No   Please list all bones broken since you turned 50 or older. na   Have you ever had a bone density test or DXA scan? Yes   Are you currently taking or have you ever taken any of the following medications? Opiods (Oxycodone, Hyrocodone)    PPI's (Nexium, Prilosec)    SSRI's (Antidepressants (Lexapro, Zoloft)   Do you take Calcium? Yes   If you take Calcium what dose? 1000   Do you take Vitamin D? Yes   If you take Vitamin D what dose? 400   Have  you ever been diagnosed with cancer? No   Please indicate what type of cancer and when you were diagnosed. na   Have you ever been treated for cancer with high beam radiation or had radioactive implants? No   If you have been treated for cancer with high beam radiation or had radioactive implants please indicate what type.           he has medical history and medication use known to be associated with bone loss and osteoporosis to include compression fracture of T12.  Pathological fracture of the distal radius, GERD, opioid pain medications, vitamin-D deficiency PPIs        Review of Systems:  ROS:  Constitutional: no fever or chills  Eyes: no visual changes  ENT: no nasal congestion or sore throat  Respiratory: no respiratory symptoms  Cardiovascular: no chest pain or palpitations  Gastrointestinal: no nausea or vomiting, tolerating diet, Positive diverticulitis, hepatitis, GERD  Genitourinary: no hematuria or dysuria  Integument/Breast: no rash or pruritis  Hematologic/Lymphatic: no easy bruising or lymphadenopathy  Musculoskeletal: no arthralgias or myalgias, Spondylosis, lumbago, osteoporosis, chronic back pain, cervical neck pain, occipital neuralgia, sciatic nerve pain, low back pain, status post lumbar spinal fusion  Neurological: Positive chronic pain, cluster headaches, spinal stenosis lumbar and thoracic region compression fracture T12, chronic migraines,  Behavioral/Psych: no auditory or visual hallucinations, Positive for depression  Endocrine: no heat or cold intolerance      OBJECTIVE:     PHYSICAL EXAM:    Weight: 78 kg (171 lb 13.6 oz),                  General: well developed/well nourished  Behavioral/Psych: normal mood/affect  Skin: no rash  Head/Neck: atraumatic, normocephalic, without obvious abnormality  Respiratory: normal respiratory effort  Cardiac: regular rate and rhythm  Vascular: pulses present  Abdomen: soft, non-tender  Musculoskeletal: no joint tenderness, deformity or  swelling               ASSESSMENT/PLAN:     Assessment:    Osteoporosis, at high risk for future fractures, history of left wrist fracture.    Plan:   30-45 minutes spent in face-to-face consultation with patient and his family members today, discussing the disease management of osteoporosis, for the reduction of future fractures.  I have explained that bone strength is equal to bone quality. A bone density / DEXA scan is important to complement his care since his fracture was by definition a fragility fracture/traumatic fracture.  Over half of the encounter was spent (50%) counseling the patient on the disease of osteoporosis evidence-based and best practice treatment options available as well as recommendations for improvement of bone quality, the importance of nutritional supplements to include:  Calcium 8117-5855 mg daily in divided doses   Vitamin D3  1447-9197 units daily in divided doses.   Fall prevention and personal safety for the reduction of future fractures.    Clinicians Guidelines for the treatment of Osteoporosis 2017 as part of the National Osteoporosis Foundation were utilized as the evidence-based information provided.    Discussed pharmaceutical treatment options to include Biphosphatases, Denosumab, Abaloparatide and Teriparatide. Information to include indications for therapy, risks and benefits to treatment, and important safety information related to these treatments was provided and discussed.  Handouts were given to the patient for his review on osteoporosis and other pharmacological treatment information related to other available treatment options.    The importance of diet, impact exercise, and core strengthening with gait and balance exercise, through  both formal physical therapy programs and home exercise programs was discussed.     Bone density test recommended and ordered  Bone health labs recommended and ordered    Will see him back following testing to discuss treatment  options

## 2019-02-27 ENCOUNTER — CLINICAL SUPPORT (OUTPATIENT)
Dept: REHABILITATION | Facility: HOSPITAL | Age: 69
End: 2019-02-27
Attending: NEUROLOGICAL SURGERY
Payer: COMMERCIAL

## 2019-02-27 DIAGNOSIS — M54.16 LUMBAR BACK PAIN WITH RADICULOPATHY AFFECTING LOWER EXTREMITY: ICD-10-CM

## 2019-02-27 PROCEDURE — 97113 AQUATIC THERAPY/EXERCISES: CPT

## 2019-02-27 NOTE — PROGRESS NOTES
Physical Therapy Daily Treatment Note     Name: Raffy Rutherford Jr.  Clinic Number: [id number]    Therapy Diagnosis:   Low back pain  Physician: Jason Caldwell MD    Visit Date: 2/27/2019  Physician Orders: PT Eval and Treat Low back pain w/ bilat radiculopathy  Medical Diagnosis from Referral: SI joint dysfunction, s/p lumbar fusion, s/p R SHAARD, polyneuropathy  Evaluation Date: 11/15/2018  Authorization Period Expiration: 12/31/2019  Plan of Care Expiration: 4/8/19  Visit # / Visits authorized: 11/30    Time In: 1020  Time Out: 1120  Total Billable Time:60 minutes    Precautions: Standard    Subjective     Pt reports: using SI belt PRN with standing  He was compliant with home exercise program.  Response to previous treatment: soreness x 1 day in B quads  Functional change: using B AFO less with mobility    Pain: 2/10 back,3/10 legs  Location: Back & legs    Objective   Raffy sustained L distal radius fx while on vacation in [place] 1/2018 - has short arm cast (covered with vinyl cast cover during aqua therapy)    Raffy received aquatic therapeutic exercises to develop strength, endurance, ROM, flexibility, posture and core stabilization for 60 minutes including:    WARMUP  Walking fwd/back/side x 2 laps    STRETCHES  HS  quad  Piriformis- R only    LE EX 15oz weights  Mini Squat with QS x30  Heel raise with GS x30  Single leg heel raise x10-np  Fwd step up x 30-np  Lateral steps up x 30 with intermittent UE support-np  Step up/down/lateral on box x 10 each with single UE support  Hip hikes x 30-np  Walking lunges in // bars x 4 laps(VC for push off)  Hip abd/add x 30-np  Abduction walks in mini squat stance with B hip IR x 2 lap  Monster walks x 2 laps  Fwd steps up on 2 steps x 10 each  Step down on 2 steps x 10 each  Running with push off on toes x 4 laps with 30-45 sec rest between each lap-np  Resistive walking with PT holding orange tband fwd/back/side x 1/2 lap x3 each direction -np  Modified tandem for  hitting ball with tennis racket(clinton, backhand  &overhead)  X 5 minutes using RUE-np  Running backwards x 2 laps-VC for core stabization- np  Step up & over box x 20 each LE  Step up lat onto box x 20 each LE  Obliques:Opposite elbow to knee  x15 each  Resistive walking  fwd/back using jogging belt with red cord(short)  X 10 reps x 2 sets    Ankle stabilization:  Heel walks in // bars 2 laps   Toe walks in // bars with UE support 2 laps  Static standing in // bars in tandem stance 2x45 sec with each LE in the posterior position -np  Tandem walks fwd/back in // bars with occasional UE support 2 laps   Marching with 3 sec holds & focus on cocontraction of quads/hamstring with stance phase-np  Mini squat hold with chest press of disc x 20-np  Walking lunges with ball rotation x 2 laps-np  Braiding // bars x 2 laps (no external support)  Staggered stance for catch & throw ball(LOB x1) 2/2 fair activation of ankle df-np  Seated wonder board using BUE/LE to propel fwd/back/side x 2 laps each  Seated wonder board for propel laterally  Wobble board for fwd/back & laterally x20 each direction  Wobble board for clockwise & ccw x 20 each direction with addition of LE weights  Seated wonderboard for propelling with heels backward & toes fwd x 2 laps each    Orange tb for ankle pf/eversion/inversion x 15 reps-np    Core: NP  Shoulder horizontal abd/add in modified tandem with apc 30x-np  Shoulder flx/ext in modified tandem with apc 30x-np  Shoulder extensions with 1 DB submersion just below surface in long lever arm position 3 x 30 sec each   Wall sits w/ aquatic 1 aquatic weights in long lever arm position 3x30 sec NP  Static standing in mini squat position with yellow ball submersion chest press 3x10 -np    ENDURANCE  Marching x 3 min-NP    COOL DOWN  1 lap fwd/back/sideways    Manual spinal stretches for 3 x 15 sec-np  Patient brought SI stabilization belt as recommended by land PT; assisted patient in proper fit    Home  Exercises Provided and Patient Education Provided     Education provided:   - knee to chest stretches & truncal rotations([id number])    Written Home Exercises Provided: yes.  Exercises were reviewed and Raffy was able to demonstrate them prior to the end of the session.  Raffy demonstrated good  understanding of the education provided.     See EMR under Media for exercises provided 12/11/2018. Added additional ex of towel scrunches & single leg stance against wall(1/7/2019)     Assessment     Pt spencer tx with ther ex well, good form, posture, core stab tech with few VCs required, Modified some exercises 2/2 LUE with cast cover.. Tolerated progression of exercises with addition of resistive walking without any increases in pain. Raffy is highly motivated & participatory with PT treatment &  adherent to  HEP. Goals remain appropriate  Raffy is progressing well towards his goals.   Pt prognosis is Good.     Pt will continue to benefit from skilled aquatic outpatient physical therapy to address the deficits listed in the problem list box on initial evaluation, provide pt/family education and to maximize pt's level of independence in the home and community environment.     Pt's spiritual, cultural and educational needs considered and pt agreeable to plan of care and goals.    Anticipated barriers to physical therapy: none    Goals:  Short Term Goals: 4 weeks   1. Pt will be able to tolerate 20 minutes of driving reporting no more than 10% increase in back pain/ radicular sx(progresing- not met)  2. Pt will be able to ascend/descend 12 steps with one railing reporting no increase in pain in order to be able to navigate to work(progressing - not met)  3. Pt will be independent with HEP and report compliance at least 5 days/week(MET 2/18/2019)     Long Term Goals: 8 weeks   1. Pt will demonstrate B foot MMT of at least 4/5 throughout to demonstrate improved functional mobility(progressing - not met)  2. Pt will be  able to tolerate 1 hour of standing reporting no increase in sx to be able to tolerate playing stand up bass(progressing - not met)  3. Pt will demonstrate independent gait with minimal gait deviations(progressing - not met)  Plan     Plan of care Certification: 11/15/2018 to 2/15/19.     Outpatient Physical Therapy 2 times weekly for 12 weeks to include the following interventions: Dry Needling, Aquatic Therapy, Cervical/Lumbar Traction, Electrical Stimulation TENS, Gait Training, Manual Therapy, Moist Heat/ Ice, Neuromuscular Re-ed, Orthotic Management and Training, Patient Education, Self Care, Therapeutic Activites, Therapeutic Exercise and Ultrasound.     Antonia Ca, PT

## 2019-03-06 ENCOUNTER — HOSPITAL ENCOUNTER (OUTPATIENT)
Dept: RADIOLOGY | Facility: CLINIC | Age: 69
Discharge: HOME | End: 2019-03-06
Attending: PHYSICIAN ASSISTANT
Payer: COMMERCIAL

## 2019-03-06 ENCOUNTER — OFFICE VISIT (OUTPATIENT)
Dept: ORTHOPEDICS | Facility: CLINIC | Age: 69
End: 2019-03-06
Payer: COMMERCIAL

## 2019-03-06 VITALS
DIASTOLIC BLOOD PRESSURE: 71 MMHG | BODY MASS INDEX: 26.41 KG/M2 | HEIGHT: 68 IN | SYSTOLIC BLOOD PRESSURE: 113 MMHG | WEIGHT: 174.25 LBS | HEART RATE: 76 BPM

## 2019-03-06 DIAGNOSIS — M81.6 LOCALIZED OSTEOPOROSIS OF LEQUESNE: ICD-10-CM

## 2019-03-06 DIAGNOSIS — M81.0 OSTEOPOROSIS, UNSPECIFIED OSTEOPOROSIS TYPE, UNSPECIFIED PATHOLOGICAL FRACTURE PRESENCE: ICD-10-CM

## 2019-03-06 DIAGNOSIS — E55.9 VITAMIN D DEFICIENCY: Primary | ICD-10-CM

## 2019-03-06 DIAGNOSIS — M80.80XA PATHOLOGICAL FRACTURE DUE TO OSTEOPOROSIS, UNSPECIFIED FRACTURE SITE, UNSPECIFIED OSTEOPOROSIS TYPE, INITIAL ENCOUNTER: ICD-10-CM

## 2019-03-06 PROCEDURE — 99213 PR OFFICE/OUTPT VISIT, EST, LEVL III, 20-29 MIN: ICD-10-PCS | Mod: S$GLB,,, | Performed by: PHYSICIAN ASSISTANT

## 2019-03-06 PROCEDURE — 77080 DXA BONE DENSITY AXIAL: CPT | Mod: 26,,, | Performed by: INTERNAL MEDICINE

## 2019-03-06 PROCEDURE — 77080 DXA BONE DENSITY AXIAL: CPT | Mod: TC

## 2019-03-06 PROCEDURE — 99999 PR PBB SHADOW E&M-EST. PATIENT-LVL IV: CPT | Mod: PBBFAC,,, | Performed by: PHYSICIAN ASSISTANT

## 2019-03-06 PROCEDURE — 1101F PR PT FALLS ASSESS DOC 0-1 FALLS W/OUT INJ PAST YR: ICD-10-PCS | Mod: CPTII,S$GLB,, | Performed by: PHYSICIAN ASSISTANT

## 2019-03-06 PROCEDURE — 77080 DEXA BONE DENSITY SPINE HIP: ICD-10-PCS | Mod: 26,,, | Performed by: INTERNAL MEDICINE

## 2019-03-06 PROCEDURE — 99213 OFFICE O/P EST LOW 20 MIN: CPT | Mod: S$GLB,,, | Performed by: PHYSICIAN ASSISTANT

## 2019-03-06 PROCEDURE — 1101F PT FALLS ASSESS-DOCD LE1/YR: CPT | Mod: CPTII,S$GLB,, | Performed by: PHYSICIAN ASSISTANT

## 2019-03-06 PROCEDURE — 99999 PR PBB SHADOW E&M-EST. PATIENT-LVL IV: ICD-10-PCS | Mod: PBBFAC,,, | Performed by: PHYSICIAN ASSISTANT

## 2019-03-06 RX ORDER — ERGOCALCIFEROL 1.25 MG/1
50000 CAPSULE ORAL
Qty: 6 CAPSULE | Refills: 0 | Status: SHIPPED | OUTPATIENT
Start: 2019-03-06 | End: 2019-04-22

## 2019-03-06 NOTE — PROGRESS NOTES
Chief Complaint & History of Present Illness:  Raffy Rutherford Jr. is a established patient 68 y.o. male who is seen here today for review of lab results, DEXA scan results, and determine a treatment plan for his osteoporosis.  he has reviewed the information provided at his initial visit.  After review of treatment options together we has elected to proceed with vitamin-D as his treatment option today for his osteoporosis and to reduce risk of future fractures.        Review of patient's allergies indicates:   Allergen Reactions    Alphagan [brimonidine]      Patient taking MASO-B Selective Inhibitor Selegiline (Emsam)    Coumadin [warfarin]      itch    Oxycodone      hiccups         Current Outpatient Medications   Medication Sig Dispense Refill    butalbital-acetaminophen-caffeine -40 mg (FIORICET, ESGIC) -40 mg per tablet Take 1 tablet by mouth daily as needed for Pain. headache 30 tablet 3    clonazePAM (KLONOPIN) 0.5 MG tablet Take 1 tablet (0.5 mg total) by mouth 2 (two) times daily as needed for Anxiety. 60 tablet 2    HYDROcodone-acetaminophen (NORCO) 5-325 mg per tablet Take 1 tablet by mouth every 4 to 6 hours as needed for Pain (moderate to severe). 42 tablet 0    lamoTRIgine (LAMICTAL) 200 MG tablet Take one tablet by mouth once daily 90 tablet 3    lithium (ESKALITH) 300 MG capsule Take 150 mg by mouth 2 (two) times daily.      lithium (LITHOTAB) 300 mg tablet Take 1 tablet by mouth at bedtime 30 tablet 11    lithium carbonate 150 MG capsule Take 1 capsule (150 mg total) by mouth 2 (two) times daily. 60 capsule 5    pravastatin (PRAVACHOL) 40 MG tablet Take 1 tablet (40 mg total) by mouth once daily. 90 tablet 3    RABEprazole (ACIPHEX) 20 mg tablet Take 1 tablet (20 mg total) by mouth 2 (two) times daily. 180 tablet 3    selegiline (EMSAM) 12 mg/24 hr Place 1 patch onto the skin once daily. 90 patch 3    selegiline (EMSAM) 12 mg/24 hr Place onto the skin once daily. 90 patch  3    senna-docusate 8.6-50 mg (PERICOLACE) 8.6-50 mg per tablet Take 2 tablets by mouth nightly as needed for Constipation.      sucralfate (CARAFATE) 1 gram tablet as needed.       traZODone (DESYREL) 100 MG tablet Take one tablet by mouth every night at bedtime 90 tablet 3    VOLTAREN 1 % Gel       ergocalciferol (ERGOCALCIFEROL) 50,000 unit Cap Take 1 capsule (50,000 Units total) by mouth every 7 days. for 6 doses 6 capsule 0     No current facility-administered medications for this visit.        Past Medical History:   Diagnosis Date    Adenomatous polyp 2003    Adenomatous polyp     Allergy     Arthritis     Back pain     after trauma beginning in 195    Cataract     Chronic pain     neck and left shoulder    Cluster headache 2013    Colon polyp     Degenerative disc disease     Depression     Diverticulitis 12/2013    Elevated PSA     Fibromyalgia 2013    GERD (gastroesophageal reflux disease)     Hepatitis 1970's    A    History of prostate biopsy 2002    Hyperlipidemia     Joint pain     Sleep apnea     Special screening for malignant neoplasms, colon 5/5/2014    Thyroid nodule 7/16/2014    Visual disturbance 2012    problems after cataract surgery       Past Surgical History:   Procedure Laterality Date    BACK SURGERY      BLEPHAROPLASTY UPPER LID Bilateral 2/10/2015    Performed by Rhett Lainez III, MD at [facility] OR 2ND FLR    BLOCK, GANGLION IMPAR N/A 6/25/2013    [facility]    CATARACT EXTRACTION W/  INTRAOCULAR LENS IMPLANT Bilateral     CHOLECYSTECTOMY      COLONOSCOPY N/A 5/5/2014    Performed by Pedro Carlos MD at [facility] ENDO (4TH FLR)    COSMETIC SURGERY  2/10/2015    Direct mid-forehead brow lift    COSMETIC SURGERY  2/10/2015    Bilateral upper lid blepahroplasty    ESOPHAGOGASTRODUODENOSCOPY (EGD) N/A 4/28/2015    Performed by Amadou Hardin MD at [facility] ENDO (4TH FLR)    EYE SURGERY      FUSION EXTREME LATERAL N/A  6/22/2015    Performed by Milad [facility]ne MD at [facility] OR 2ND FLR    HEMORRHOID SURGERY      with complication of chronic bleeding for 6 weeks     INJECTION, STEROID Right SI Joint Block and Steroid Injection Right 12/6/2018    Per[facility] by [facility] at [facility]    JOINT REPLACEMENT      KNEE SURGERY      involving arthroscopic surgery to both knees    LIFT-BROW midforehead direct Bilateral 2/10/2015    Performed by Rhett Lainez III, MD at [facility] OR 2ND FLR    SINUS SURGERY      SINUS SURGERY      left molar and sinus surgery for trigeminal neuralgia    SPINAL FUSION  6/22/2015    L3-L5 XLIF    SPINE SURGERY  6/22/15    XLIF/TANA    TOTAL HIP ARTHROPLASTY  4/2012    Pt states he had total hip replacement on his left hip.       Lab Results   Component Value Date     02/25/2019    K 3.8 02/25/2019     02/25/2019    CO2 30 (H) 02/25/2019    GLU 79 02/25/2019    BUN 16 02/25/2019    CREATININE 0.8 02/25/2019    CALCIUM 10.0 02/25/2019    PROT 7.6 02/25/2019    ALBUMIN 4.1 02/25/2019    BILITOT 0.4 02/25/2019    ALKPHOS 51 (L) 02/25/2019    AST 22 02/25/2019    ALT 28 02/25/2019    ANIONGAP 8 02/25/2019    ESTGFRAFRICA >60.0 02/25/2019    EGFRNONAA >60.0 02/25/2019      Lab Results   Component Value Date    WBC 11.20 12/17/2018    RBC 5.31 12/17/2018    HGB 16.8 12/17/2018    HCT 50.6 12/17/2018    MCV 95 12/17/2018    MCH 31.6 (H) 12/17/2018    MCHC 33.2 12/17/2018    RDW 12.3 12/17/2018     12/17/2018    MPV 9.1 (L) 12/17/2018    GRAN 10.3 (H) 12/17/2018    GRAN 91.6 (H) 12/17/2018    LYMPH 0.3 (L) 12/17/2018    LYMPH 2.9 (L) 12/17/2018    MONO 0.5 12/17/2018    MONO 4.5 12/17/2018    EOS 0.0 12/17/2018    BASO 0.02 12/17/2018    EOSINOPHIL 0.0 12/17/2018    BASOPHIL 0.2 12/17/2018    DIFFMETHOD Automated 12/17/2018      Lab Results   Component Value Date    MG 2.4 02/25/2019      No results found for: PHOS   Lab Results   Component Value Date    WTMAAGXS94MV 13 (L) 02/25/2019       Lab Results   Component Value Date    PTH 51.0 02/25/2019      Lab Results   Component Value Date    TSH 0.393 (L) 02/25/2019      Lab Results   Component Value Date    FREET4 0.89 02/25/2019      Lab Results   Component Value Date    HGBA1C 5.5 10/15/2018    ESTIMATEDAVG 111 10/15/2018      No results found for: FREETESTOSTE     DEXA Scan was reviewed and demonstrates : 03/06/2019     T-Score Hip: -1.7     T-Score Spine: -0.8      FRAX:      Major Fx.: 11%         Hip Fx.: 2.2%                                 TBS adjusted      T-Score Spine: 0.7      FRAX:      Major Fx.: 6.8%         Hip Fx.: 1.9%          Vital Signs (Most Recent)  Vitals:    03/06/19 1421   BP: 113/71   Pulse: 76         Review of Systems:  ROS:  Constitutional: no fever or chills  Eyes: no visual changes  ENT: no nasal congestion or sore throat  Respiratory: no respiratory symptoms  Cardiovascular: no chest pain or palpitations  Gastrointestinal: no nausea or vomiting, tolerating diet, Positive diverticulitis, hepatitis, GERD  Genitourinary: no hematuria or dysuria  Integument/Breast: no rash or pruritis  Hematologic/Lymphatic: no easy bruising or lymphadenopathy  Musculoskeletal: no arthralgias or myalgias, Spondylosis, lumbago, osteoporosis, chronic back pain, cervical neck pain, occipital neuralgia, sciatic nerve pain, low back pain, status post lumbar spinal fusion  Neurological: Positive chronic pain, cluster headaches, spinal stenosis lumbar and thoracic region compression fracture T12, chronic migraines,  Behavioral/Psych: no auditory or visual hallucinations, Positive for depression  Endocrine: no heat or cold intolerance           he will continue with her calcium and vitamin D.  Fall prevention and personal safety have been reviewed.        Assessment and plan:    Osteopenia    Vitamin-D deficiency  Vitamin-D 50,000 I use once Q week x6 weeks    Follow up check a vitamin-D level after 6 weeks

## 2019-03-07 ENCOUNTER — HOSPITAL ENCOUNTER (OUTPATIENT)
Dept: RADIOLOGY | Facility: OTHER | Age: 69
Discharge: HOME | End: 2019-03-07
Attending: PHYSICIAN ASSISTANT
Payer: COMMERCIAL

## 2019-03-07 ENCOUNTER — CLINICAL SUPPORT (OUTPATIENT)
Dept: REHABILITATION | Facility: HOSPITAL | Age: 69
End: 2019-03-07
Attending: NEUROLOGICAL SURGERY
Payer: COMMERCIAL

## 2019-03-07 ENCOUNTER — OFFICE VISIT (OUTPATIENT)
Dept: ORTHOPEDICS | Facility: CLINIC | Age: 69
End: 2019-03-07
Payer: COMMERCIAL

## 2019-03-07 VITALS
SYSTOLIC BLOOD PRESSURE: 119 MMHG | DIASTOLIC BLOOD PRESSURE: 78 MMHG | HEART RATE: 51 BPM | BODY MASS INDEX: 26.37 KG/M2 | WEIGHT: 174 LBS | HEIGHT: 68 IN

## 2019-03-07 DIAGNOSIS — M54.16 LUMBAR BACK PAIN WITH RADICULOPATHY AFFECTING LOWER EXTREMITY: ICD-10-CM

## 2019-03-07 DIAGNOSIS — S52.502A CLOSED FRACTURE OF DISTAL END OF LEFT RADIUS, UNSPECIFIED FRACTURE MORPHOLOGY, INITIAL ENCOUNTER: ICD-10-CM

## 2019-03-07 DIAGNOSIS — S52.502A CLOSED FRACTURE OF DISTAL END OF LEFT RADIUS, UNSPECIFIED FRACTURE MORPHOLOGY, INITIAL ENCOUNTER: Primary | ICD-10-CM

## 2019-03-07 PROCEDURE — 99213 PR OFFICE/OUTPT VISIT, EST, LEVL III, 20-29 MIN: ICD-10-PCS | Mod: S$GLB,,, | Performed by: PHYSICIAN ASSISTANT

## 2019-03-07 PROCEDURE — 73110 X-RAY EXAM OF WRIST: CPT | Mod: 26,LT,, | Performed by: RADIOLOGY

## 2019-03-07 PROCEDURE — 97113 AQUATIC THERAPY/EXERCISES: CPT

## 2019-03-07 PROCEDURE — 97760 PR ORTHOTIC MGMT&TRAINJ INITIAL ENC EA 15 MINS: ICD-10-PCS | Mod: S$GLB,,, | Performed by: PHYSICIAN ASSISTANT

## 2019-03-07 PROCEDURE — 1101F PT FALLS ASSESS-DOCD LE1/YR: CPT | Mod: CPTII,S$GLB,, | Performed by: PHYSICIAN ASSISTANT

## 2019-03-07 PROCEDURE — 99999 PR PBB SHADOW E&M-EST. PATIENT-LVL V: CPT | Mod: PBBFAC,,, | Performed by: PHYSICIAN ASSISTANT

## 2019-03-07 PROCEDURE — 97760 ORTHOTIC MGMT&TRAING 1ST ENC: CPT | Mod: S$GLB,,, | Performed by: PHYSICIAN ASSISTANT

## 2019-03-07 PROCEDURE — 73110 X-RAY EXAM OF WRIST: CPT | Mod: TC,FY,LT

## 2019-03-07 PROCEDURE — 1101F PR PT FALLS ASSESS DOC 0-1 FALLS W/OUT INJ PAST YR: ICD-10-PCS | Mod: CPTII,S$GLB,, | Performed by: PHYSICIAN ASSISTANT

## 2019-03-07 PROCEDURE — 99213 OFFICE O/P EST LOW 20 MIN: CPT | Mod: S$GLB,,, | Performed by: PHYSICIAN ASSISTANT

## 2019-03-07 PROCEDURE — 73110 XR WRIST COMPLETE 3 VIEWS LEFT: ICD-10-PCS | Mod: 26,LT,, | Performed by: RADIOLOGY

## 2019-03-07 PROCEDURE — 99999 PR PBB SHADOW E&M-EST. PATIENT-LVL V: ICD-10-PCS | Mod: PBBFAC,,, | Performed by: PHYSICIAN ASSISTANT

## 2019-03-07 NOTE — PROGRESS NOTES
Subjective:      Patient ID: Raffy Rutherford Jr. is a 68 y.o. male.    Chief Complaint: Pain of the Left Wrist      HPI  Raffy Rutherford Jr. is a right hand dominant 68 y.o. male presenting today for follow up of left distal radius fracture.  He has been in a cast for the past 4 weeks.  He reports that his pain is 4/10.  He is working on moving the fingers more, but reports some wrist pain with finger motion. He has started playing the keyboard again, but is not doing any string work with the upright bass or guitar, due to pain.  Pain is mildly improved with use of Norco, he is not taking this every day.  He reports that he has been using the right arm more, has noticed pain at the right elbow, increased with use.  He does report a history of tennis elbow years ago.  There was a history of trauma, he tripped on a raised door stop/ledge while on vacation in [place].  Trauma occurred approximately 1/29/19.  He did see a physician in [facility].  Ultrasound was performed and a fracture was noted, he was provided with a wrist brace and a IM injection of Voltaren and a steroid.    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         Review of patient's allergies indicates:   Allergen Reactions    Alphagan [brimonidine]      Patient taking MASO-B Selective Inhibitor Selegiline (Emsam)    Coumadin [warfarin]      itch    Oxycodone      hiccups         Current Outpatient Medications   Medication Sig Dispense Refill    butalbital-acetaminophen-caffeine -40 mg (FIORICET, ESGIC) -40 mg per tablet Take 1 tablet by mouth daily as needed for Pain. headache 30 tablet 3    clonazePAM (KLONOPIN) 0.5 MG tablet Take 1 tablet (0.5 mg total) by mouth 2 (two) times daily as needed for Anxiety. 60 tablet 2    ergocalciferol (ERGOCALCIFEROL) 50,000 unit Cap Take 1 capsule (50,000 Units total) by mouth every 7 days. for 6 doses 6  capsule 0    HYDROcodone-acetaminophen (NORCO) 5-325 mg per tablet Take 1 tablet by mouth every 4 to 6 hours as needed for Pain (moderate to severe). 42 tablet 0    lamoTRIgine (LAMICTAL) 200 MG tablet Take one tablet by mouth once daily 90 tablet 3    lithium (ESKALITH) 300 MG capsule Take 150 mg by mouth 2 (two) times daily.      lithium (LITHOTAB) 300 mg tablet Take 1 tablet by mouth at bedtime 30 tablet 11    lithium carbonate 150 MG capsule Take 1 capsule (150 mg total) by mouth 2 (two) times daily. 60 capsule 5    pravastatin (PRAVACHOL) 40 MG tablet Take 1 tablet (40 mg total) by mouth once daily. 90 tablet 3    RABEprazole (ACIPHEX) 20 mg tablet Take 1 tablet (20 mg total) by mouth 2 (two) times daily. 180 tablet 3    selegiline (EMSAM) 12 mg/24 hr Place 1 patch onto the skin once daily. 90 patch 3    selegiline (EMSAM) 12 mg/24 hr Place onto the skin once daily. 90 patch 3    senna-docusate 8.6-50 mg (PERICOLACE) 8.6-50 mg per tablet Take 2 tablets by mouth nightly as needed for Constipation.      sucralfate (CARAFATE) 1 gram tablet as needed.       traZODone (DESYREL) 100 MG tablet Take one tablet by mouth every night at bedtime 90 tablet 3    VOLTAREN 1 % Gel        No current facility-administered medications for this visit.        Past Medical History:   Diagnosis Date    Adenomatous polyp 2003    Adenomatous polyp     Allergy     Arthritis     Back pain     after trauma beginning in 195    Cataract     Chronic pain     neck and left shoulder    Cluster headache 2013    Colon polyp     Degenerative disc disease     Depression     Diverticulitis 12/2013    Elevated PSA     Fibromyalgia 2013    GERD (gastroesophageal reflux disease)     Hepatitis 1970's    A    History of prostate biopsy 2002    Hyperlipidemia     Joint pain     Sleep apnea     Special screening for malignant neoplasms, colon 5/5/2014    Thyroid nodule 7/16/2014    Visual disturbance 2012    problems  "after cataract surgery       Past Surgical History:   Procedure Laterality Date    BACK SURGERY      BLEPHAROPLASTY UPPER LID Bilateral 2/10/2015    Performed by Rhett Lainez III, MD at [facility] OR 2ND FLR    BLOCK, GANGLION IMPAR N/A 6/25/2013    Per[facility] by [facility]eli Evans [facility] at [facility]IN T    CATARACT EXTRACTION W/  INTRAOCULAR LENS IMPLANT Bilateral     CHOLECYSTECTOMY      COLONOSCOPY N/A 5/5/2014    Performed by Pedro Carlos MD at [facility] ENDO (4TH FLR)    COSMETIC SURGERY  2/10/2015    Direct mid-forehead brow lift    COSMETIC SURGERY  2/10/2015    Bilateral upper lid blepahroplasty    ESOPHAGOGASTRODUODENOSCOPY (EGD) N/A 4/28/2015    Performed by Amadou Hardin MD at [facility] ENDO (4TH FLR)    EYE SURGERY      FUSION EXTREME LATERAL N/A 6/22/2015    Performed by Milad Browne MD at [facility] OR 2ND FLR    HEMORRHOID SURGERY      with complication of chronic bleeding for 6 weeks     INJECTION, STEROID Right SI Joint Block and Steroid Injection Right 12/6/2018    [facility]    JOINT REPLACEMENT      KNEE SURGERY      involving arthroscopic surgery to both knees    LIFT-BROW midforehead direct Bilateral 2/10/2015    Performed by Rhett Lainez III, MD at [facility] OR 2ND FLR    SINUS SURGERY      SINUS SURGERY      left molar and sinus surgery for trigeminal neuralgia    SPINAL FUSION  6/22/2015    L3-L5 XLIF    SPINE SURGERY  6/22/15    XLIF/TANA    TOTAL HIP ARTHROPLASTY  4/2012    Pt states he had total hip replacement on his left hip.         Review of Systems:  Review of Systems   Constitution: Negative for chills and fever.   Skin: Negative for rash and suspicious lesions.   Musculoskeletal:        See HPI   Neurological: Negative for dizziness, headaches, light-headedness, numbness and paresthesias.   Psychiatric/Behavioral: Negative for depression. The patient is not nervous/anxious.          OBJECTIVE:     PHYSICAL EXAM:  Height: 5' 8" (172.7 cm) " "Weight: 78.9 kg (174 lb)  Vitals:    03/07/19 1048   BP: 119/78   Pulse: (!) 51   Weight: 78.9 kg (174 lb)   Height: 5' 8" (1.727 m)   PainSc:   4     General    Vitals reviewed.  Constitutional: He is oriented to person, place, and time. He appears well-developed and well-nourished.   HENT:   Head: Normocephalic and atraumatic.   Neck: Normal range of motion.   Cardiovascular: Normal rate.    Pulmonary/Chest: Effort normal. No respiratory distress.   Neurological: He is alert and oriented to person, place, and time.   Psychiatric: He has a normal mood and affect. His behavior is normal. Judgment and thought content normal.             Musculoskeletal: Mild edema noted over the left hand and wrist, improved ecchymosis volarly at the left wrist. No lacerations or abrasions.  Nontender palpation over the left anatomical snuffbox, moderately tender at the left radiocarpal joint.  Good finger range of motion left hand.  Wrist motion not assessed today due to immobilization.  Neurovascularly intact-good sensation and motor function, good capillary refill, 2+ radial pulses.  Tender to palpation at the lateral epicondyle of the right elbow.  Good right elbow range of motion. He does have pain with supination pronation and extension.      RADIOGRAPHS:  Left Wrist X-Ray, 2/20/19  FINDINGS:  There is a nondisplaced fracture of the distal radius, unchanged from prior study.  Cartilage spaces are maintained. Soft tissues are unremarkable.      Impression     As above.     Comments: I have personally reviewed the imaging and I agree with the above radiologist's report.    ASSESSMENT/PLAN:   Raffy was seen today for pain.    Diagnoses and all orders for this visit:    Closed fracture of distal end of left radius, unspecified fracture morphology, initial encounter  -     Ambulatory Referral to Physical/Occupational Therapy           - We talked at length about the anatomy and pathophysiology of   Encounter Diagnosis   Name " Primary?    Closed fracture of distal end of left radius, unspecified fracture morphology, initial encounter Yes       - x-ray imaging reviewed with the patient.   - left forearm brace provided (15 minutes spent preparing, fitting, and educating on brace).  - Follow up in 4 weeks with X-Ray  - Schedule with fracture clinic  - start OT  - Norco 5/325 mg as needed for moderate to severe pain; Tylenol for mild pain    Disclaimer: This note has been generated using voice-recognition software. There may be typographical errors that have been missed during proof-reading.

## 2019-03-07 NOTE — PROGRESS NOTES
Physical Therapy Daily Treatment Note     Name: Raffy Rutherford Jr.  Clinic Number: [id number]    Therapy Diagnosis:   Low back pain  Physician: Jason Caldwell MD    Visit Date: 3/7/2019  Physician Orders: PT Eval and Treat Low back pain w/ bilat radiculopathy  Medical Diagnosis from Referral: SI joint dysfunction, s/p lumbar fusion, s/p R SHARAD, polyneuropathy  Evaluation Date: 11/15/2018  Authorization Period Expiration: 12/31/2019  Plan of Care Expiration: 4/8/19  Visit # / Visits authorized: 12/30    Time In: 1400  Time Out: 1500  Total Billable Time:60 minutes    Precautions: Standard    Subjective     Pt reports: using SI belt PRN with standing  He was compliant with home exercise program.  Response to previous treatment: soreness x 1 day in B quads  Functional change: using B AFO less with mobility    Pain: 2/10 back,3/10 legs  Location: Back & legs    Objective   Raffy sustained L distal radius fx while on vacation in [place] 1/2018 - has short arm cast(removed 3/6/2019) & replaced with velcro brace (covered with vinyl cast cover during aqua therapy)    Raffy received aquatic therapeutic exercises to develop strength, endurance, ROM, flexibility, posture and core stabilization for 60 minutes including:    WARMUP  Walking fwd/back/side x 2 laps    STRETCHES  HS  quad  Piriformis- R only    LE EX 15oz weights  Mini Squat with QS x30  Heel raise with GS x30  Single leg heel raise x10-np  Fwd step up x 30-np  Lateral steps up x 30 with intermittent UE support-np  Step up/down/lateral on box x 10 each with single UE support  Hip hikes x 30-np  Walking lunges in // bars x 4 laps(VC for push off)  Hip abd/add x 30-np  Abduction walks in mini squat stance with B hip IR x 2 lap  Monster walks x 2 laps  Fwd steps up on 2 steps x 10 each  Step down on 2 steps x 20 each  Running with push off on toes x 4 laps with 30-45 sec rest between each lap-np  Resistive walking with PT holding orange tband fwd/back/side x 1/2  lap x3 each direction -np  Modified tandem for hitting ball with tennis racket(clinton, backhand  &overhead)  X 5 minutes using RUE-np  Running backwards x 2 laps-VC for core stabization- np  Step up & over box x 20 each LE  Step up lat onto box x 20 each LE  Obliques:Opposite elbow to knee  x15 each  Resistive walking  fwd/backside using jogging belt with PT holding red tubing x     Ankle stabilization:  Heel walks in // bars 2 laps   Toe walks in // bars with UE support 2 laps  Static standing in // bars in tandem stance 2x45 sec with each LE in the posterior position -np  Tandem walks fwd/back in // bars with occasional UE support 2 laps   Marching with 3 sec holds & focus on cocontraction of quads/hamstring with stance phase-np  Mini squat hold with chest press of disc x 20-np  Walking lunges with ball rotation x 2 laps-np  Braiding // bars x 2 laps (no external support)  Staggered stance for catch & throw ball(LOB x1) 2/2 fair activation of ankle df-np  Seated wonder board using BUE/LE to propel fwd/back/side x 2 laps each  Seated wonder board for propel laterally  Wobble board for fwd/back & laterally x20 each direction  Wobble board for clockwise & ccw x 20 each direction with addition of LE weights  Seated wonderboard for propelling with heels backward & toes fwd x 2 laps each  Seated wonderboard for propelling with BLE sideways x 2 laps  Orange tb for ankle pf/eversion/inversion x 15 reps-np    Core: NP  Shoulder horizontal abd/add in modified tandem with apc 30x-np  Shoulder flx/ext in modified tandem with apc 30x-np  Shoulder extensions with 1 DB submersion just below surface in long lever arm position 3 x 30 sec each   Wall sits w/ aquatic 1 aquatic weights in long lever arm position 3x30 sec NP  Static standing in mini squat position with yellow ball submersion chest press 3x10 -np    ENDURANCE  Marching x 3 min-NP    COOL DOWN  1 lap fwd/back/sideways    Manual spinal stretches for 3 x 15  sec-np  Patient brought SI stabilization belt as recommended by land PT; assisted patient in proper fit    Home Exercises Provided and Patient Education Provided     Education provided:   - knee to chest stretches & truncal rotations([id number])    Written Home Exercises Provided: yes.  Exercises were reviewed and Raffy was able to demonstrate them prior to the end of the session.  Raffy demonstrated good  understanding of the education provided.     See EMR under Media for exercises provided 12/11/2018. Added additional ex of towel scrunches & single leg stance against wall(1/7/2019)     Assessment     Pt spencer tx with ther ex well, good form, posture, core stab tech with few VCs required, Modified some exercises 2/2 LUE with cast cover.. Tolerated progression of exercises with addition of resistive walking without any increases in pain. No longer has slapping foot gait Raffy is highly motivated & participatory with PT treatment &  adherent to  HEP. Goals remain appropriate  Raffy is progressing well towards his goals.   Pt prognosis is Good.     Pt will continue to benefit from skilled aquatic outpatient physical therapy to address the deficits listed in the problem list box on initial evaluation, provide pt/family education and to maximize pt's level of independence in the home and community environment.     Pt's spiritual, cultural and educational needs considered and pt agreeable to plan of care and goals.    Anticipated barriers to physical therapy: none    Goals:  Short Term Goals: 4 weeks   1. Pt will be able to tolerate 20 minutes of driving reporting no more than 10% increase in back pain/ radicular sx(progresing- not met)  2. Pt will be able to ascend/descend 12 steps with one railing reporting no increase in pain in order to be able to navigate to work(progressing - not met)  3. Pt will be independent with HEP and report compliance at least 5 days/week(MET 2/18/2019)     Long Term Goals: 8 weeks    1. Pt will demonstrate B foot MMT of at least 4/5 throughout to demonstrate improved functional mobility(progressing - not met)  2. Pt will be able to tolerate 1 hour of standing reporting no increase in sx to be able to tolerate playing stand up bass(progressing - not met)  3. Pt will demonstrate independent gait with minimal gait deviations(progressing - not met)  Plan     Plan of care Certification: 11/15/2018 to 4/8/2019     Outpatient Physical Therapy 2 times weekly for 12 weeks to include the following interventions: Dry Needling, Aquatic Therapy, Cervical/Lumbar Traction, Electrical Stimulation TENS, Gait Training, Manual Therapy, Moist Heat/ Ice, Neuromuscular Re-ed, Orthotic Management and Training, Patient Education, Self Care, Therapeutic Activites, Therapeutic Exercise and Ultrasound.     Antonia Ca, PT

## 2019-03-11 ENCOUNTER — CLINICAL SUPPORT (OUTPATIENT)
Dept: REHABILITATION | Facility: HOSPITAL | Age: 69
End: 2019-03-11
Attending: NEUROLOGICAL SURGERY
Payer: COMMERCIAL

## 2019-03-11 DIAGNOSIS — M54.16 LUMBAR BACK PAIN WITH RADICULOPATHY AFFECTING LOWER EXTREMITY: ICD-10-CM

## 2019-03-11 PROCEDURE — 97113 AQUATIC THERAPY/EXERCISES: CPT

## 2019-03-11 RX ORDER — HYDROCODONE BITARTRATE AND ACETAMINOPHEN 5; 325 MG/1; MG/1
1 TABLET ORAL EVERY 8 HOURS PRN
Qty: 21 TABLET | Refills: 0 | Status: ON HOLD | OUTPATIENT
Start: 2019-03-11 | End: 2019-09-19 | Stop reason: SDUPTHER

## 2019-03-11 NOTE — PROGRESS NOTES
Physical Therapy Daily Treatment Note     Name: Raffy Rutherford Jr.  Clinic Number: [id number]    Therapy Diagnosis:   Low back pain  Physician: Jason Caldwell MD    Visit Date: 3/11/2019  Physician Orders: PT Eval and Treat Low back pain w/ bilat radiculopathy  Medical Diagnosis from Referral: SI joint dysfunction, s/p lumbar fusion, s/p R SHARAD, polyneuropathy  Evaluation Date: 11/15/2018  Authorization Period Expiration: 12/31/2019  Plan of Care Expiration: 4/8/19  Visit # / Visits authorized: 13/30    Time In: 1403  Time Out: 1503  Total Billable Time:60 minutes    Precautions: Standard    Subjective     Pt reports: using SI belt PRN with standing  He was compliant with home exercise program.  Response to previous treatment: soreness x 1 day in B quads  Functional change: using B AFO less with mobility    Pain: 2/10 back,3/10 legs  Location: Back & legs    Objective   Raffy sustained L distal radius fx while on vacation in [place] 1/2018 - has short arm cast(removed 3/6/2019) & replaced with velcro brace (covered with vinyl cast cover during aqua therapy)    Raffy received aquatic therapeutic exercises to develop strength, endurance, ROM, flexibility, posture and core stabilization for 60 minutes including:    WARMUP  Walking fwd/back/side x 2 laps    STRETCHES  HS  QS- standing      LE EX 15oz weights  Mini Squat with QS x30  Heel raise with GS x30  Single leg heel raise x10-np  Fwd step up x 30-np  Lateral steps up x 30 with intermittent UE support-np  Step up/down/lateral on box x 10 each with single UE support  Hip hikes x 30-np  Walking lunges in // bars x 4 laps(VC for push off)  Hip abd/add x 30-np  Abduction walks in mini squat stance with B hip IR x 2 lap  Monster walks x 2 laps  Fwd steps up on 2 steps x 10 each  Step down on 2 steps x 20 each  Running with push off on toes x 4 laps with 30-45 sec rest between each lap-np  Resistive walking with PT holding orange tband fwd/back/side x 1/2 lap x3 each  direction -np  Modified tandem for hitting ball with tennis racket(clinton, backhand  &overhead)  X 5 minutes using RUE-np  Running backwards x 2 laps-VC for core stabization- np  Step up & over box x 20 each LE-np  Step up lat onto box x 20 each LE-np  Obliques:Opposite elbow to knee  x15 each  Resistive walking  fwd/backside using jogging belt with PT holding red tubing x 4 each  Esistive walking laterally using jogginh belt with PT holding red tubinng x4    Ankle stabilization:  Heel walks in // bars 2 laps   Toe walks in // bars with UE support 2 laps  Static standing in // bars in tandem stance 2x45 sec with each LE in the posterior position -np  Tandem walks fwd/back in // bars with occasional UE support 2 laps   Marching with 3 sec holds & focus on cocontraction of quads/hamstring with stance phase-np  Mini squat hold with chest press of disc x 20-np  Walking lunges with ball rotation x 2 laps-np  Braiding // bars x 2 laps (no external support)  Staggered stance for catch & throw ball(LOB x1) 2/2 fair activation of ankle df-np  Seated wonder board using BUE/LE to propel fwd/back/side x 2 laps each  Seated wonder board for propel laterally  Wobble board for fwd/back & laterally x20 each direction  Wobble board for clockwise & ccw x 20 each direction with addition of LE weights  Seated wonderboard for propelling with heels backward & toes fwd x 2 laps each  Seated wonderboard for propelling with BLE sideways x 2 laps  Orange tb for ankle pf/eversion/inversion x 15 reps-np    Core: NP  Shoulder horizontal abd/add in modified tandem with apc 30x-np  Shoulder flx/ext in modified tandem with apc 30x-np  Shoulder extensions with 1 DB submersion just below surface in long lever arm position 3 x 30 sec each   Wall sits w/ aquatic 1 aquatic weights in long lever arm position 3x30 sec NP  Static standing in mini squat position with yellow ball submersion chest press 3x10 -np    ENDURANCE  Marching x 3 min-NP    COOL  DOWN  1 lap fwd/back/sideways    Manual spinal stretches for 3 x 15 sec-np  Patient brought SI stabilization belt as recommended by land PT; assisted patient in proper fit    Home Exercises Provided and Patient Education Provided     Education provided:   - knee to chest stretches & truncal rotations(HEP2Go [id number])    Written Home Exercises Provided: yes.  Exercises were reviewed and Raffy was able to demonstrate them prior to the end of the session.  Raffy demonstrated good  understanding of the education provided.     See EMR under Media for exercises provided 12/11/2018. Added additional ex of towel scrunches & single leg stance against wall(1/7/2019)     Assessment     Pt spencer tx with ther ex well, good form, posture, core stab tech with few VCs required, Modified some exercises 2/2 LUE with cast cover.. Tolerated progression of exercises with addition of resistive walking without any increases in pain. No longer has slapping foot gait Raffy is highly motivated & participatory with PT treatment &  adherent to  HEP. Goals remain appropriate  Raffy is progressing well towards his goals.   Pt prognosis is Good.     Pt will continue to benefit from skilled aquatic outpatient physical therapy to address the deficits listed in the problem list box on initial evaluation, provide pt/family education and to maximize pt's level of independence in the home and community environment.     Pt's spiritual, cultural and educational needs considered and pt agreeable to plan of care and goals.    Anticipated barriers to physical therapy: none    Goals:  Short Term Goals: 4 weeks   1. Pt will be able to tolerate 20 minutes of driving reporting no more than 10% increase in back pain/ radicular sx(progresing- not met)  2. Pt will be able to ascend/descend 12 steps with one railing reporting no increase in pain in order to be able to navigate to work(progressing - not met)  3. Pt will be independent with HEP and report compliance  at least 5 days/week(MET 2/18/2019)     Long Term Goals: 8 weeks   1. Pt will demonstrate B foot MMT of at least 4/5 throughout to demonstrate improved functional mobility(progressing - not met)  2. Pt will be able to tolerate 1 hour of standing reporting no increase in sx to be able to tolerate playing stand up bass(progressing - not met)  3. Pt will demonstrate independent gait with minimal gait deviations(progressing - not met)  Plan     Plan of care Certification: 11/15/2018 to 4/8/2019     Outpatient Physical Therapy 2 times weekly for 12 weeks to include the following interventions: Dry Needling, Aquatic Therapy, Cervical/Lumbar Traction, Electrical Stimulation TENS, Gait Training, Manual Therapy, Moist Heat/ Ice, Neuromuscular Re-ed, Orthotic Management and Training, Patient Education, Self Care, Therapeutic Activites, Therapeutic Exercise and Ultrasound.     Antonia Ca, PT

## 2019-03-12 ENCOUNTER — TELEPHONE (OUTPATIENT)
Dept: ORTHOPEDICS | Facility: CLINIC | Age: 69
End: 2019-03-12

## 2019-03-13 ENCOUNTER — CLINICAL SUPPORT (OUTPATIENT)
Dept: REHABILITATION | Facility: HOSPITAL | Age: 69
End: 2019-03-13
Attending: NEUROLOGICAL SURGERY
Payer: COMMERCIAL

## 2019-03-13 DIAGNOSIS — M54.16 LUMBAR BACK PAIN WITH RADICULOPATHY AFFECTING LOWER EXTREMITY: ICD-10-CM

## 2019-03-13 PROCEDURE — 97113 AQUATIC THERAPY/EXERCISES: CPT

## 2019-03-13 NOTE — PROGRESS NOTES
Physical Therapy Daily Treatment Note     Name: Raffy Rutherford Jr.  Clinic Number: [id number]    Therapy Diagnosis:   Low back pain  Physician: Jason Caldwell MD    Visit Date: 3/13/2019  Physician Orders: PT Eval and Treat Low back pain w/ bilat radiculopathy  Medical Diagnosis from Referral: SI joint dysfunction, s/p lumbar fusion, s/p R SHARAD, polyneuropathy  Evaluation Date: 11/15/2018  Authorization Period Expiration: 12/31/2019  Plan of Care Expiration: 4/8/19  Visit # / Visits authorized: 14/30    Time In: 1400  Time Out: 1500  Total Billable Time:60 minutes    Precautions: Standard    Subjective     Pt reports: sat a lot yesterday & had increased back pain yesterday; did not wear his SI belt  He was compliant with home exercise program.  Response to previous treatment: no soreness  Functional change: using B AFO less with mobility    Pain: 2/10 back,3/10 legs  Location: Back & legs    Objective   Raffy sustained L distal radius fx while on vacation in [place] 1/2018 - has short arm cast(removed 3/6/2019) & replaced with velcro brace (covered with vinyl cast cover during aqua therapy)    Raffy received aquatic therapeutic exercises to develop strength, endurance, ROM, flexibility, posture and core stabilization for 60 minutes including:    WARMUP  Walking fwd/back/side x 2 laps    STRETCHES  HS  QS- standing      LE EX 20oz weights  Mini Squat with QS x30  Heel raise with GS x30  Single leg heel raise x10-np  Fwd step up x 30-np  Lateral steps up x 30 with intermittent UE support-np  Step up/down/lateral on box x 10 each with single UE support  Hip hikes x 30-np  Walking lunges in // bars x 4 laps(VC for push off)  Hip abd/add x 30-np  Abduction walks in mini squat stance with B hip IR x 2 lap  Monster walks x 2 laps  Fwd steps up on 2 steps x 10 each  Step down on 2 steps x 20 each  Running with push off on toes x 4 laps with 30-45 sec rest between each lap-np  Modified tandem for hitting ball with tennis  racket(clinton, backhand  &overhead)  X 5 minutes using RUE-np  Running backwards x 2 laps-VC for core stabization- np  Step up & over box x 20 each LE-np  Step up lat onto box x 20 each LE-np  Obliques:Opposite elbow to knee  x15 each  Resistive walking  fwd/backside using jogging belt with PT holding red tubing x 4 each  Esistive walking laterally using jogginh belt with PT holding red tubinng x4    Ankle stabilization:  Heel walks in // bars 2 laps   Toe walks in // bars with UE support 2 laps  Static standing in // bars in tandem stance 2x45 sec with each LE in the posterior position -np  Tandem walks fwd/back in // bars with occasional UE support 2 laps   Marching with 3 sec holds & focus on cocontraction of quads/hamstring with stance phase-np  Mini squat hold with chest press of disc x 20-np  Walking lunges with ball rotation x 2 laps-np  Braiding // bars x 2 laps (no external support)  Staggered stance for catch & throw ball(LOB x1) 2/2 fair activation of ankle df-np  Seated wonder board using BUE/LE to propel fwd/back/side x 2 laps each  Seated wonder board for propel laterally  Wobble board for fwd/back & laterally x20 each direction  Wobble board for clockwise & ccw x 20 each direction with addition of LE weights  Seated wonderboard for propelling with heels backward & toes fwd x 2 laps each  Seated wonderboard for propelling with BLE sideways x 2 laps  Orange tb for ankle pf/eversion/inversion x 15 reps-np    Core: NP  Shoulder horizontal abd/add in modified tandem with apc 30x-np  Shoulder flx/ext in modified tandem with apc 30x-np  Shoulder extensions with 1 DB submersion just below surface in long lever arm position 3 x 30 sec each   Wall sits w/ aquatic 1 aquatic weights in long lever arm position 3x30 sec NP  Static standing in mini squat position with yellow ball submersion chest press 3x10 -np    ENDURANCE  Marching x 3 min-NP    COOL DOWN  1 lap fwd/back/sideways    Manual spinal stretches for 3  x 15 sec-np  Patient brought SI stabilization belt as recommended by land PT; assisted patient in proper fit    Home Exercises Provided and Patient Education Provided     Education provided:   - knee to chest stretches & truncal rotations(HEP2Go [id number])    Written Home Exercises Provided: yes.  Exercises were reviewed and Raffy was able to demonstrate them prior to the end of the session.  Raffy demonstrated good  understanding of the education provided.     See EMR under Media for exercises provided 12/11/2018. Added additional ex of towel scrunches & single leg stance against wall(1/7/2019)     Assessment     Pt spencer tx with ther ex well, good form, posture, core stab tech with few VCs required, Modified some exercises 2/2 LUE with cast cover.. Tolerated progression of exercises with addition of resistive walking without any increases in pain. No longer has slapping foot gait Raffy is highly motivated & participatory with PT treatment &  adherent to  HEP. Still with difficulty with plantar flexion/dorisflexionbut  gait is slowly normalizing. Goals remain appropriate  Raffy is progressing well towards his goals.   Pt prognosis is Good.     Pt will continue to benefit from skilled aquatic outpatient physical therapy to address the deficits listed in the problem list box on initial evaluation, provide pt/family education and to maximize pt's level of independence in the home and community environment.     Pt's spiritual, cultural and educational needs considered and pt agreeable to plan of care and goals.    Anticipated barriers to physical therapy: none    Goals:  Short Term Goals: 4 weeks   1. Pt will be able to tolerate 20 minutes of driving reporting no more than 10% increase in back pain/ radicular sx(progresing- not met)  2. Pt will be able to ascend/descend 12 steps with one railing reporting no increase in pain in order to be able to navigate to work(progressing - not met)  3. Pt will be independent with  HEP and report compliance at least 5 days/week(MET 2/18/2019)     Long Term Goals: 8 weeks   1. Pt will demonstrate B foot MMT of at least 4/5 throughout to demonstrate improved functional mobility(progressing - not met)  2. Pt will be able to tolerate 1 hour of standing reporting no increase in sx to be able to tolerate playing stand up bass(progressing - not met)  3. Pt will demonstrate independent gait with minimal gait deviations(progressing - not met)  Plan     Plan of care Certification: 11/15/2018 to 4/8/2019     Outpatient Physical Therapy 2 times weekly for 12 weeks to include the following interventions: Dry Needling, Aquatic Therapy, Cervical/Lumbar Traction, Electrical Stimulation TENS, Gait Training, Manual Therapy, Moist Heat/ Ice, Neuromuscular Re-ed, Orthotic Management and Training, Patient Education, Self Care, Therapeutic Activites, Therapeutic Exercise and Ultrasound.     Antonia Ca, PT

## 2019-03-18 ENCOUNTER — CLINICAL SUPPORT (OUTPATIENT)
Dept: REHABILITATION | Facility: HOSPITAL | Age: 69
End: 2019-03-18
Attending: NEUROLOGICAL SURGERY
Payer: COMMERCIAL

## 2019-03-18 DIAGNOSIS — M54.16 LUMBAR BACK PAIN WITH RADICULOPATHY AFFECTING LOWER EXTREMITY: ICD-10-CM

## 2019-03-18 PROCEDURE — 97113 AQUATIC THERAPY/EXERCISES: CPT

## 2019-03-18 NOTE — PROGRESS NOTES
Physical Therapy Daily Treatment Note     Name: Raffy Rutherford Jr.  Clinic Number: [id number]    Therapy Diagnosis:   Low back pain  Physician: Jason Caldwell MD    Visit Date: 3/18/2019  Physician Orders: PT Eval and Treat Low back pain w/ bilat radiculopathy  Medical Diagnosis from Referral: SI joint dysfunction, s/p lumbar fusion, s/p R SHARAD, polyneuropathy  Evaluation Date: 11/15/2018  Authorization Period Expiration: 12/31/2019  Plan of Care Expiration: 4/8/19  Visit # / Visits authorized: 15/30    Time In: 1400  Time Out: 1500  Total Billable Time:30 minutes    Precautions: Standard    Subjective     Pt reports: he will start occupational therapy for his left hand tomorrow 4/19/2019  He was compliant with home exercise program.  Response to previous treatment: no soreness  Functional change: using B AFO less with mobility    Pain: 0/10  Location: none    Objective   Raffy sustained L distal radius fx while on vacation in [place] 1/2018 - has short arm cast(removed 3/6/2019) & replaced with velcro brace (covered with vinyl cast cover during aqua therapy)    Raffy received aquatic therapeutic exercises to develop strength, endurance, ROM, flexibility, posture and core stabilization for 60 minutes including:    WARMUP  Walking fwd/back/side x 2 laps    STRETCHES  HS  QS- standing      LE EX 25 oz weights  Mini Squat with QS x30  Heel raise with GS x30  Single leg heel raise x10-np  Fwd step up x 30-np  Lateral steps up x 30 with intermittent UE support-np  Step up/down/lateral on box x 10 each with single UE support  Hip hikes x 30-np  Walking lunges in // bars x 4 laps(VC for push off)  Hip abd/add x 30-np  Abduction walks in mini squat stance with B hip IR x 2 lap  Monster walks x 2 laps  Fwd steps up on 2 steps x 10 each  Step down on 2 steps x 20 each  Running with push off on toes x 4 laps with 30-45 sec rest between each lap-np  Modified tandem for hitting ball with tennis racket(clinton, backhand   &overhead)  X 5 minutes using RUE-np  Running backwards x 2 laps-VC for core stabization- np  Step up & over box x 20 each LE-np  Step up lat onto box x 20 each LE-np  Step up box with step down reverse lunge  Obliques:Opposite elbow to knee  x15 each  Resistive walking  Fwd/back/side using jogging belt with PT holding red tubing x 4 each      Ankle stabilization:  Heel walks in // bars 2 laps   Toe walks in // bars with UE support 2 laps  Static standing in // bars in tandem stance 2x45 sec with each LE in the posterior position -np  Tandem walks fwd/back in // bars with occasional UE support 2 laps   Marching with 3 sec holds & focus on cocontraction of quads/hamstring with stance phase-np  Mini squat hold with chest press of disc x 20-np  Walking lunges with ball rotation x 2 laps-np  Braiding // bars x 2 laps (no external support)  Staggered stance for catch & throw ball(LOB x1) 2/2 fair activation of ankle df-np  Seated wonder board using BUE/LE to propel fwd/back/side x 3 laps each  Wobble board for fwd/back & laterally x 25 each direction  Wobble board for clockwise & ccw x 25 each direction with addition of LE weights  Seated wonderboard for propelling with heels backward & toes fwd x 2 laps each  Seated wonderboard for propelling with BLE sideways x 2 laps  Orange tb for ankle pf/eversion/inversion x 15 reps-np    Core: NP  Shoulder horizontal abd/add in modified tandem with apc 30x-np  Shoulder flx/ext in modified tandem with apc 30x-np  Shoulder extensions with 1 DB submersion just below surface in long lever arm position 3 x 30 sec each   Wall sits w/ aquatic 1 aquatic weights in long lever arm position 3x30 sec NP  Static standing in mini squat position with yellow ball submersion chest press 3x10 -np    ENDURANCE  Marching x 3 min-NP    COOL DOWN  1 lap fwd/back/sideways    Manual spinal stretches for 3 x 15 sec-np  Patient brought SI stabilization belt as recommended by land PT; assisted patient in  proper fit    Home Exercises Provided and Patient Education Provided     Education provided:   - knee to chest stretches & truncal rotations(HEP2Go [id number])    Written Home Exercises Provided: yes.  Exercises were reviewed and Raffy was able to demonstrate them prior to the end of the session.  Raffy demonstrated good  understanding of the education provided.     See EMR under Media for exercises provided 12/11/2018. Added additional ex of towel scrunches & single leg stance against wall(1/7/2019)     Assessment     Pt spencer tx with ther ex well, good form, posture, core stab tech with few VCs required, Modified some exercises 2/2 LUE with cast cover.. Tolerated progression of exercises with addition of increased LE weights without increases in pain. No longer has slapping foot gait Raffy is highly motivated & participatory with PT treatment &  adherent to  HEP. Still with difficulty with plantar flexion/dorisflexionbut  gait is slowly normalizing. Goals remain appropriate  Raffy is progressing well towards his goals.   Pt prognosis is Good.     Pt will continue to benefit from skilled aquatic outpatient physical therapy to address the deficits listed in the problem list box on initial evaluation, provide pt/family education and to maximize pt's level of independence in the home and community environment.     Pt's spiritual, cultural and educational needs considered and pt agreeable to plan of care and goals.    Anticipated barriers to physical therapy: none    Goals:  Short Term Goals: 4 weeks   1. Pt will be able to tolerate 20 minutes of driving reporting no more than 10% increase in back pain/ radicular sx(progresing- not met)  2. Pt will be able to ascend/descend 12 steps with one railing reporting no increase in pain in order to be able to navigate to work(progressing - not met)  3. Pt will be independent with HEP and report compliance at least 5 days/week(MET 2/18/2019)     Long Term Goals: 8 weeks   1. Pt  will demonstrate B foot MMT of at least 4/5 throughout to demonstrate improved functional mobility(progressing - not met)  2. Pt will be able to tolerate 1 hour of standing reporting no increase in sx to be able to tolerate playing stand up bass(progressing - not met)  3. Pt will demonstrate independent gait with minimal gait deviations(progressing - not met)  Plan      Plan of care Certification: 11/15/2018 to 4/8/2019     Outpatient Physical Therapy 2 times weekly for 12 weeks to include the following interventions: Dry Needling, Aquatic Therapy, Cervical/Lumbar Traction, Electrical Stimulation TENS, Gait Training, Manual Therapy, Moist Heat/ Ice, Neuromuscular Re-ed, Orthotic Management and Training, Patient Education, Self Care, Therapeutic Activites, Therapeutic Exercise and Ultrasound.     Antonia Ca, PT

## 2019-03-19 ENCOUNTER — CLINICAL SUPPORT (OUTPATIENT)
Dept: REHABILITATION | Facility: HOSPITAL | Age: 69
End: 2019-03-19
Attending: ORTHOPAEDIC SURGERY
Payer: COMMERCIAL

## 2019-03-19 DIAGNOSIS — M25.532 PAIN IN LEFT WRIST: ICD-10-CM

## 2019-03-19 DIAGNOSIS — K21.9 GASTROESOPHAGEAL REFLUX DISEASE, ESOPHAGITIS PRESENCE NOT SPECIFIED: ICD-10-CM

## 2019-03-19 DIAGNOSIS — M25.60 RANGE OF MOTION DEFICIT: Primary | ICD-10-CM

## 2019-03-19 PROCEDURE — 97165 OT EVAL LOW COMPLEX 30 MIN: CPT

## 2019-03-19 PROCEDURE — 97022 WHIRLPOOL THERAPY: CPT

## 2019-03-19 PROCEDURE — 97110 THERAPEUTIC EXERCISES: CPT

## 2019-03-19 NOTE — PATIENT INSTRUCTIONS
"[facility], OCCUPATIONAL THERAPY  HOME EXERCISE PROGRAM       Complete the following exercises with 10 repetitions each, 4 x/day.     AROM: Supination / Pronation   With your elbow by your side, turn your palm up then turn your palm down.     AROM: Wrist Flexion / Extension               Bend your wrist forward and back as far as possible.      AROM: Wrist Radial / Ulnar Deviation  Bend your wrist from side to side as far as possible.    AROM: Wrist Flexion / Extension  Make a fist, then bend your wrist forward then back as far as possible.         AROM: Wrist Radial / Ulnar Deviation   Make a fist then bend your wrist toward your body, then away.                                            AROM: Composite Flexion / Extension ("Full Fist")  Bend every joint in your hand into a fist. Hold 3 seconds. Straighten your fingers.     Copyright © [employer]. All rights reserved.     Therapist: RENA Mckenzie CHT    "

## 2019-03-19 NOTE — PLAN OF CARE
"  [facility] Occupational Therapy  Initial Evaluation     Name: Raffy Rutherford Jr.  Clinic Number: [id number]    Therapy Diagnosis:   Encounter Diagnoses   Name Primary?    Pain in left wrist     Range of motion deficit Yes     Physician: Cici Carvajal, Hank    Physician Orders: Distal radius fx, non-Op treatment  Eval and Treat, modalities as needed  1-2 times/week for 6+ weeks  Medical Diagnosis:  S52.502A (ICD-10-CM Closed fracture of distal end of left radius, unspecified fracture morphology, initial encounter  Surgical Procedure and Date: N/A  Evaluation Date: 3/19/2019  Insurance: [facility] [employer]  Insurance Authorization period Expiration: 2019  Plan of Care Certification Period: 3/19/2019 to 2019    Visit # / Visits Authortized: 1 / 10  Time In:2:00 PM  Time Out: 3:00 PM  Total Billable Time: 60 minutes  Charges: 1 low eval, 1 TE, fluido    Precautions: Standard    Subjective     Involved Side: left  Dominant Side: Right  Date of Onset:   Mechanism of Injury: fell in [place]  History of Current Condition: Pt was placed in cast for 4 weeks, then placed in removable wrist orthosis for the past 2 weeks  Imagin2019 FINDINGS:  Subacute minimally displaced fracture again identified in the distal radial metaphysis; fracture line may extend to the articular surface.  Fracture fragments are in satisfactory position and alignment.  No new periosteal bone formation detected.    Ulnar styloid is intact.  Transverse lucency in the distal ulnar metaphysis, seen on AP and oblique views, raise the question of osteoclastic resorption at an nondisplaced ulnar fracture.    There is mild osteoarthrosis at the 1st CMC.    Previous Therapy: yes    Patient's Goals for Therapy: "Be able to return to playing music."    Pain:  Functional Pain Scale Rating 0-10:   3/10 on average  2/10 at best  5/10 at worst  Location: left wrsit  Description: Aching, sharp  Aggravating Factors: " Extension, Flexing and out of brace  Easing Factors: ice    Occupation:  Musician, psychotherapist and   Working presently: employed  Duties: writing and typing    Functional Limitations/Social History:    Previous functional status includes: Independent with all ADLs.     Current FunctionalStatus   Home/Living environment : lives with their spouse      Limitation of Functional Status as follows:   ADLs/IADLs:     - Feeding:Independent    - Bathing: Independent    - Dressing/Grooming: Difficulty buttoning    - Driving: Independent     Leisure: Unable to play music and work out at gym      Past Medical History/Physical Systems Review:   Raffy Rutherford Jr.  has a past medical history of Adenomatous polyp, Adenomatous polyp, Allergy, Arthritis, Back pain, Cataract, Chronic pain, Cluster headache, Colon polyp, Degenerative disc disease, Depression, Diverticulitis, Elevated PSA, Fibromyalgia, GERD (gastroesophageal reflux disease), Hepatitis, History of prostate biopsy, Hyperlipidemia, Joint pain, Sleep apnea, Special screening for malignant neoplasms, colon, Thyroid nodule, and Visual disturbance.    Raffy Rutherford Jr.  has a past surgical history that includes Sinus surgery; Knee surgery; Hemorrhoid surgery; Cholecystectomy; Sinus surgery; Eye surgery; Back surgery; Joint replacement; Total hip arthroplasty (4/2012); Cosmetic surgery (2/10/2015); Cosmetic surgery (2/10/2015); Spine surgery (6/22/15); Cataract extraction w/  intraocular lens implant (Bilateral); Spinal fusion (6/22/2015); and Injection of steroid (Right, 12/6/2018).    Raffy has a current medication list which includes the following prescription(s): butalbital-acetaminophen-caffeine -40 mg, clonazepam, ergocalciferol, hydrocodone-acetaminophen, lamotrigine, lithium, lithium, lithium carbonate, pravastatin, rabeprazole, selegiline, selegiline, senna-docusate 8.6-50 mg, sucralfate, trazodone, and voltaren.    Review of patient's allergies  indicates:   Allergen Reactions    Alphagan [brimonidine]      Patient taking MASO-B Selective Inhibitor Selegiline (Emsam)    Coumadin [warfarin]      itch    Oxycodone      hiccups          Objective   Observation/Appearance:  Skin intact and minimal bruising noted on volar surface of wrist    Edema. Measured in centimeters.   3/19/2019 3/19/2019    Left Right   Proximal Wrist Crease 18 16.4   Mid palm 19.9 20.9   MCPs 20.5 21.4     Hand ROM: WNL    Sensation: Intact    Wrist ROM  Date 3/19/2019 3/19/2019    Left Right   Supination/Pronation 80/90 85/90   Wrist ext/flex 40/20 70/75   Wrist RD/UD 20/20 35/50      Strength (Dyanmometer) and Pinch Strength (Pinch Gauge)  Measured in pounds and psi. Average of three trials.   3/19/2019 3/19/2019    Left Right   Rung II def def   Guthrie Pinch def def   3pt Pinch def def   2pt Pinch def def         CMS Impairment/Limitation/Restriction for FOTO Hand Survey    Therapist reviewed FOTO scores for Raffy Rutherford  on 3/19/2019.   FOTO documents entered into [employer] - see Media section.    Limitation Score: 64%  Category: Carrying    Current : CL = least 60% but < 80% impaired, limited or restricted  Goal: CK = at least 40% but < 60% impaired, limited or restricted         Treatment     Treatment Time In: 2:00 PM  Treatment Time Out: 3:00 PM  Total Treatment time separate from Evaluation time:30 min    Raffy received the following supervised modalities after being cleared for contradictions for 15 minutes:   -Patient received fluidotherapy to left hand/wrist for 15 minutes to increase blood flow, circulation, desensitization, sensory re-education and for pain management.     Raffy received the following manual therapy techniques for 5 minutes:   -STM to left wrist    Raffy received therapeutic exercises for 10 minutes including:  -AROM as follows: composite fist, thumb opposition, wrist ext/flex, sup/pron and RD/UD x 10 reps    Home Exercise Program/Education:  Issued  HEP (see patient instructions in EMR) and educated on modality use for pain management . Exercises were reviewed and Raffy was able to demonstrate them prior to the end of the session.   Pt received a written copy of exercises to perform at home. Raffy demonstrated good  understanding of the education provided.  Pt was advised to perform these exercises free of pain, and to stop performing them if pain occurs.    Patient/Family Education: role of OT, goals for OT, scheduling/cancellations - pt verbalized understanding. Discussed insurance limitations with patient.    Additional Education provided: None    Assessment     Raffy Rutherford Jr. is a 68 y.o. male referred to outpatient occupational/hand therapy and presents with a medical diagnosis of left distal radius fracture, resulting in pain, decreased range of motion and decreased functional use of left hand/wrist and demonstrates limitations as described in the chart below. Following a brief medical record review it is determined that pt will benefit from occupational therapy services in order to maximize pain free and/or functional use of left hand/wrist.     The patient's rehab potential is Excellent.     Anticipated barriers to occupational therapy: none  Pt has no cultural, educational or language barriers to learning provided.    Profile and History Assessment of Occupational Performance Level of Clinical Decision Making Complexity Score   Occupational Profile:   Raffy Rutherford Jr. is a 68 y.o. male who lives with their spouse and is currently employed as a musician, psychotherapist and . Raffy Rutherford Jr. has difficulty with  playing music, lifting objects and buttoning  affecting his/her daily functional abilities. His/her main goal for therapy is to return to playing music.     Comorbidities:    has a past medical history of Adenomatous polyp, Adenomatous polyp, Allergy, Arthritis, Back pain, Cataract, Chronic pain, Cluster headache, Colon polyp,  Degenerative disc disease, Depression, Diverticulitis, Elevated PSA, Fibromyalgia, GERD (gastroesophageal reflux disease), Hepatitis, History of prostate biopsy, Hyperlipidemia, Joint pain, Sleep apnea, Special screening for malignant neoplasms, colon, Thyroid nodule, and Visual disturbance.        Medical and Therapy History Review:   Brief               Performance Deficits    Physical:  Joint Mobility  Muscle Power/Strength  Muscle Endurance  Edema   Strength  Gross Motor Coordination  Pain    Cognitive:  No Deficits    Psychosocial:    No Deficits     Clinical Decision Making:  low    Assessment Process:  Problem-Focused Assessments    Modification/Need for Assistance:  Not Necessary    Intervention Selection:  Limited Treatment Options       low  Based on PMHX, co morbidities , data from assessments and functional level of assistance required with task and clinical presentation directly impacting function.       The following goals were discussed with the patient and patient is in agreement with them as to be addressed in the treatment plan.     Goals:   Short Term (4 weeks on 4/19/2019):  1)   Patient to be IND with HEP and modalities for pain management  2)   Increase ROM 15 to 20 degrees for wrist ext/flex  to increase functional hand use for playing his instrument.  3)   Measure  in 2-3 weeks.  4)   Decrease edema .2-.3 cm to increase joint mobility /flexibility for improved overall functional hand use.     Long Term (by discharge):  1)   Pt will report 2 out of 10 pain with playing his instrument.  2)   Patient to score at 40% or less on FOTO to demonstrate improved perception of functional left hand/wrist use.  3)   Pt will return to prior level of function for ADLs and household management.       Plan   Certification Period/Plan of care expiration: 3/19/2019 to 5/19/2019.    Outpatient Occupational Therapy 2 times weekly for 8 weeks may include the following interventions: Paraffin,  Fluidotherapy, Manual therapy/joint mobilizations, Modalities for pain management, Therapeutic exercises/activities., Strengthening and Edema Control.      Mary Shi, OT

## 2019-03-20 ENCOUNTER — CLINICAL SUPPORT (OUTPATIENT)
Dept: REHABILITATION | Facility: HOSPITAL | Age: 69
End: 2019-03-20
Attending: NEUROLOGICAL SURGERY
Payer: COMMERCIAL

## 2019-03-20 DIAGNOSIS — M54.16 LUMBAR BACK PAIN WITH RADICULOPATHY AFFECTING LOWER EXTREMITY: ICD-10-CM

## 2019-03-20 PROCEDURE — 97113 AQUATIC THERAPY/EXERCISES: CPT

## 2019-03-20 RX ORDER — RABEPRAZOLE SODIUM 20 MG/1
20 TABLET, DELAYED RELEASE ORAL 2 TIMES DAILY
Qty: 180 TABLET | Refills: 3 | OUTPATIENT
Start: 2019-03-20

## 2019-03-20 NOTE — PROGRESS NOTES
Physical Therapy Daily Treatment Note     Name: Raffy Rutherford Jr.  Clinic Number: [id number]    Therapy Diagnosis:   Low back pain  Physician: Jason Caldwell MD    Visit Date: 3/20/2019  Physician Orders: PT Eval and Treat Low back pain w/ bilat radiculopathy  Medical Diagnosis from Referral: SI joint dysfunction, s/p lumbar fusion, s/p R SHARAD, polyneuropathy  Evaluation Date: 11/15/2018  Authorization Period Expiration: 12/31/2019  Plan of Care Expiration: 4/8/19  Visit # / Visits authorized: 16/30    Time In: 0900  Time Out: 1000  Total Billable Time:15 minutes    Precautions: Standard    Subjective     Pt reports: he is sore in his L hand from yesterday's OT session  He was compliant with home exercise program.  Response to previous treatment: no soreness  Functional change: using B AFO less with mobility    Pain: 3/10 legs, 1/10 back  Location: legs & back    Objective   Raffy sustained L distal radius fx while on vacation in [place] 1/2018 - has short arm cast(removed 3/6/2019) & replaced with velcro brace (covered with vinyl cast cover during aqua therapy)    Raffy received aquatic therapeutic exercises to develop strength, endurance, ROM, flexibility, posture and core stabilization for 60 minutes including:    WARMUP  Walking fwd/back/side x 2 laps    STRETCHES  HS  QS- standing      LE EX 30 oz weights  Mini Squat with QS x30  Heel raise with GS x30  Single leg heel raise x10-np  Fwd step up x 30-np  Lateral steps up x 30 with intermittent UE support-np  Step up/down/lateral on box x 10 each with single UE support  Hip hikes x 30-np  Walking lunges in // bars x 4 laps(VC for push off)  Hip abd/add x 30-np  Abduction walks in mini squat stance with B hip IR x 2 lap  Monster walks x 2 laps  Fwd steps up/down on 2 steps x 20 each step over step pattern  Running with push off on toes x 4 laps with 30-45 sec rest between each lap-np  Modified tandem for hitting ball with tennis racket(clinton, backhand   &overhead)  X 5 minutes using RUE-np  Running backwards x 2 laps-VC for core stabization- np  Step up & over box x 20 each LE-np  Step up lat onto box x 20 each LE-np  Step up box with step down reverse lunge x 20 each LE  Obliques:Opposite elbow to knee  x15 each  Resistive walking  Fwd/back/side using jogging belt with PT holding green tubing x 4 each direction      Ankle stabilization:  Heel walks in // bars 2 laps   Toe walks in // bars with UE support 2 laps  Static standing in // bars in tandem stance 2x45 sec with each LE in the posterior position -np  Tandem walks fwd/back in // bars with occasional UE support 2 laps   Marching with 3 sec holds & focus on cocontraction of quads/hamstring with stance phase-np  Mini squat hold with chest press of disc x 20-np  Walking lunges with ball rotation x 2 laps-np  Braiding // bars x 2 laps (no external support)  Staggered stance for catch & throw ball(LOB x1) 2/2 fair activation of ankle df-np  Seated wonder board using BUE/LE to propel fwd/back/side x 3 laps each  Wobble board for fwd/back & laterally x 25 each direction  Wobble board for clockwise & ccw x 25 each direction  Wobble boars single leg fw/back/laterally & cw/ccw x 10 reps each LE  Seated wonderboard for propelling with heels backward & toes fwd x 2 laps each  Seated wonderboard for propelling with BLE sideways x 2 laps  Orange tb for ankle pf/eversion/inversion x 15 reps-np    Core: NP  Shoulder horizontal abd/add in modified tandem with apc 30x-np  Shoulder flx/ext in modified tandem with apc 30x-np  Shoulder extensions with 1 DB submersion just below surface in long lever arm position 3 x 30 sec each   Wall sits w/ aquatic 1 aquatic weights in long lever arm position 3x30 sec NP  Static standing in mini squat position with yellow ball submersion chest press 3x10 -np    ENDURANCE  Marching x 3 min-NP    COOL DOWN  1 lap fwd/back/sideways    Manual spinal stretches for 3 x 15 sec-np  Patient brought SI  stabilization belt as recommended by land PT; assisted patient in proper fit    Home Exercises Provided and Patient Education Provided     Education provided:   - knee to chest stretches & truncal rotations([id number] [id number])    Written Home Exercises Provided: yes.  Exercises were reviewed and Raffy was able to demonstrate them prior to the end of the session.  Raffy demonstrated good  understanding of the education provided.     See EMR under Media for exercises provided 12/11/2018. Added additional ex of towel scrunches & single leg stance against wall(1/7/2019)     Assessment     Pt spencer tx with ther ex well, good form, posture, core stab tech with few VCs required, Modified some exercises 2/2 LUE with cast cover.. Tolerated progression of exercises with addition of increased LE weights & increased resistance on jogging belt without increases in pain. No longer has slapping foot gait Raffy is highly motivated & participatory with PT treatment &  adherent to  HEP. Still with difficulty with plantar flexion/dorisflexionbut  gait is slowly normalizing. Goals remain appropriate  Raffy is progressing well towards his goals.   Pt prognosis is Good.     Pt will continue to benefit from skilled aquatic outpatient physical therapy to address the deficits listed in the problem list box on initial evaluation, provide pt/family education and to maximize pt's level of independence in the home and community environment.     Pt's spiritual, cultural and educational needs considered and pt agreeable to plan of care and goals.    Anticipated barriers to physical therapy: none    Goals:  Short Term Goals: 4 weeks   1. Pt will be able to tolerate 20 minutes of driving reporting no more than 10% increase in back pain/ radicular sx(progresing- not met)  2. Pt will be able to ascend/descend 12 steps with one railing reporting no increase in pain in order to be able to navigate to work(progressing - not met)  3. Pt will be independent  with HEP and report compliance at least 5 days/week(MET 2/18/2019)     Long Term Goals: 8 weeks   1. Pt will demonstrate B foot MMT of at least 4/5 throughout to demonstrate improved functional mobility(progressing - not met)  2. Pt will be able to tolerate 1 hour of standing reporting no increase in sx to be able to tolerate playing stand up bass(progressing - not met)  3. Pt will demonstrate independent gait with minimal gait deviations(progressing - not met)  Plan    increase weights as tolerated  Plan of care Certification: 11/15/2018 to 4/8/2019     Outpatient Physical Therapy 2 times weekly for 12 weeks to include the following interventions: Dry Needling, Aquatic Therapy, Cervical/Lumbar Traction, Electrical Stimulation TENS, Gait Training, Manual Therapy, Moist Heat/ Ice, Neuromuscular Re-ed, Orthotic Management and Training, Patient Education, Self Care, Therapeutic Activites, Therapeutic Exercise and Ultrasound.     Antonia Ca, PT

## 2019-03-21 ENCOUNTER — CLINICAL SUPPORT (OUTPATIENT)
Dept: REHABILITATION | Facility: HOSPITAL | Age: 69
End: 2019-03-21
Attending: NEUROLOGICAL SURGERY
Payer: COMMERCIAL

## 2019-03-21 DIAGNOSIS — M25.532 PAIN IN LEFT WRIST: ICD-10-CM

## 2019-03-21 DIAGNOSIS — M25.60 RANGE OF MOTION DEFICIT: Primary | ICD-10-CM

## 2019-03-21 PROCEDURE — 97110 THERAPEUTIC EXERCISES: CPT

## 2019-03-21 PROCEDURE — 97022 WHIRLPOOL THERAPY: CPT

## 2019-03-21 NOTE — PROGRESS NOTES
"                            Occupational Therapy Daily Treatment Note     Date: 3/21/2019  Name: Raffy Rutherford Jr.  Clinic Number: [id number]    Therapy Diagnosis:   Encounter Diagnoses   Name Primary?    Range of motion deficit Yes    Pain in left wrist      Physician: Cici Carvajal, *    Physician Orders: Distal radius fx, non-Op treatment  Eval and Treat, modalities as needed  1-2 times/week for 6+ weeks  Medical Diagnosis:  S52.502A (ICD-10-CM Closed fracture of distal end of left radius, unspecified fracture morphology, initial encounter  Surgical Procedure and Date: N/A  Evaluation Date: 3/19/2019  Insurance: [facility] [employer]  Insurance Authorization period Expiration: 12/31/2019  Plan of Care Certification Period: 3/19/2019 to 5/19/2019     Visit # / Visits Authortized: 2 / 10  Time In:2:00 PM  Time Out: 3:00 PM  Total Billable Time: 55 minutes  Charges: 3 TE, fluido     Precautions: Standard      Subjective     Pt reports: he was compliant with home exercise program given last session.   Response to previous treatment:"I'm moving more."  Functional change: Decreased pain with movement    Pain: 2/10  Location: left wrist      Objective   Edema. Measured in centimeters.    3/19/2019 3/19/2019     Left Right   Proximal Wrist Crease 18 16.4   Mid palm 19.9 20.9   MCPs 20.5 21.4      Hand ROM: WNL     Sensation: Intact     Wrist ROM  Date 3/19/2019 3/19/2019     Left Right   Supination/Pronation 80/90 85/90   Wrist ext/flex 40/20 70/75   Wrist RD/UD 20/20 35/50       Strength (Dyanmometer) and Pinch Strength (Pinch Gauge)  Measured in pounds and psi. Average of three trials.    3/19/2019 3/19/2019     Left Right   Rung II def def   Guthrie Pinch def def   3pt Pinch def def   2pt Pinch def def      Raffy received the following supervised modalities after being cleared for contradictions for 15 minutes:   -Patient received fluidotherapy to left hand/wrist for 15 minutes to increase blood flow, circulation, " desensitization, sensory re-education and for pain management.      Raffy received the following manual therapy techniques for 5 minutes:   -STM to left wrist     Raffy received therapeutic exercises for 40 minutes including:  -AROM as follows: composite fist, thumb opposition, wrist ext/flex, sup/pron and RD/UD x 10 reps  -wrist wheel 3 ' each wrist flex/ext and sup/pron  -dextersizer x 3'  -hand gripper with 2 yellow bands x 30 reps  -yellow theraflex bar, frowns/smiles and wrist ext/flex 30 x each       Home Exercises and Education Provided     Education provided:   - none today  - Progress towards goals: Good     Written Home Exercises Provided: Patient instructed to cont prior HEP.  Exercises were reviewed and Raffy was able to demonstrate them prior to the end of the session.  Raffy demonstrated good  understanding of the education provided.     See EMR under Patient Instructions for exercises provided prior visit.     Raffy demonstrated good  understanding of the education provided.         Assessment   Raffy Rutherford Jr. is a 68 y.o. male referred to outpatient occupational/hand therapy and presents with a medical diagnosis of left distal radius fracture, resulting in pain, decreased range of motion and decreased functional use of left hand/wrist. Pt reports that he is able to move his wrist more with decreased pain. Advanced light activities with no difficulty.    Pt would continue to benefit from skilled OT.      Raffy is progressing well towards his goals and there are no updates to goals at this time. Pt prognosis is Excellent.     Pt will continue to benefit from skilled outpatient occupational therapy to address the deficits listed in the problem list on initial evaluation provide pt/family education and to maximize pt's level of independence in the home and community environment.     Anticipated barriers to occupational therapy: none    Pt's spiritual, cultural and educational needs considered  and pt agreeable to plan of care and goals.    Goals:  Short Term (4 weeks on 4/19/2019):  1)   Patient to be IND with HEP and modalities for pain management. Met 3/21/2019  2)   Increase ROM 15 to 20 degrees for wrist ext/flex  to increase functional hand use for playing his instrument.-progressing  3)   Measure  in 2-3 weeks.-progressing  4)   Decrease edema .2-.3 cm to increase joint mobility /flexibility for improved overall functional hand use.-progressing      Long Term (by discharge):  1)   Pt will report 2 out of 10 pain with playing his instrument.-progressing  2)   Patient to score at 40% or less on FOTO to demonstrate improved perception of functional left hand/wrist use.-progressing  3)   Pt will return to prior level of function for ADLs and household management.-progressing         Plan   Certification Period/Plan of care expiration: 3/19/2019 to 5/19/2019.     Outpatient Occupational Therapy 2 times weekly for 8 weeks may include the following interventions: Paraffin, Fluidotherapy, Manual therapy/joint mobilizations, Modalities for pain management, Therapeutic exercises/activities., Strengthening and Edema Control.       Discussed Plan of Care with patient: Yes  Updates/Grading for next session: Advance as tolerated      Mary Shi, OT

## 2019-03-26 ENCOUNTER — CLINICAL SUPPORT (OUTPATIENT)
Dept: REHABILITATION | Facility: HOSPITAL | Age: 69
End: 2019-03-26
Attending: INTERNAL MEDICINE
Payer: COMMERCIAL

## 2019-03-26 DIAGNOSIS — M25.532 PAIN IN LEFT WRIST: ICD-10-CM

## 2019-03-26 PROCEDURE — 97022 WHIRLPOOL THERAPY: CPT

## 2019-03-26 PROCEDURE — 97110 THERAPEUTIC EXERCISES: CPT

## 2019-03-26 NOTE — PROGRESS NOTES
"                            Occupational Therapy Daily Treatment Note     Date: 3/26/2019  Name: Raffy Rutherford Jr.  Clinic Number: [id number]    Therapy Diagnosis:   Encounter Diagnosis   Name Primary?    Pain in left wrist      Physician: Cici Carvajal, *    Physician Orders: Distal radius fx, non-Op treatment  Eval and Treat, modalities as needed  1-2 times/week for 6+ weeks  Medical Diagnosis:  S52.502A (ICD-10-CM Closed fracture of distal end of left radius, unspecified fracture morphology, initial encounter  Surgical Procedure and Date: N/A  Evaluation Date: 3/19/2019  Insurance: [facility] [employer]  Insurance Authorization period Expiration: 12/31/2019  Plan of Care Certification Period: 3/19/2019 to 5/19/2019     Visit # / Visits Authortized: 3 / 10  Time In: 10:30 AM  Time Out: 11:30 AM  Total Billable Time: 55 minutes  Charges: 3 TE, fluido     Precautions: Standard      Subjective     Pt reports: he was compliant with home exercise program given last session.   Response to previous treatment:"I'm moving more."  Functional change: Decreased pain with movement    Pain: 2/10  Location: left wrist      Objective   Edema. Measured in centimeters.    3/19/2019 3/19/2019     Left Right   Proximal Wrist Crease 18 16.4   Mid palm 19.9 20.9   MCPs 20.5 21.4      Hand ROM: WNL     Sensation: Intact     Wrist ROM  Date 3/19/2019 3/19/2019     Left Right   Supination/Pronation 80/90 85/90   Wrist ext/flex 40/20 70/75   Wrist RD/UD 20/20 35/50       Strength (Dyanmometer) and Pinch Strength (Pinch Gauge)  Measured in pounds and psi. Average of three trials.    3/19/2019 3/19/2019     Left Right   Rung II def def   Guthrie Pinch def def   3pt Pinch def def   2pt Pinch def def      Raffy received the following supervised modalities after being cleared for contradictions for 15 minutes:   -Patient received fluidotherapy to left hand/wrist for 15 minutes to increase blood flow, circulation, desensitization, sensory " re-education and for pain management.      Raffy received the following manual therapy techniques for 5 minutes:   -STM to left wrist     Raffy received therapeutic exercises for 40 minutes including:  -AROM as follows: composite fist, thumb opposition, wrist ext/flex, sup/pron and RD/UD x 10 reps  -wrist wheel 3 ' each wrist flex/ext and sup/pron  -dextersizer x 3'  -hand gripper with 3 yellow bands x 30 reps  -yellow theraflex bar, frowns/smiles and wrist ext/flex x 30 reps  -1 lb wrist 3 ways 1 min each     Home Exercises and Education Provided     Education provided:   - none today  - Progress towards goals: Good     Written Home Exercises Provided: Patient instructed to cont prior HEP.  Exercises were reviewed and Raffy was able to demonstrate them prior to the end of the session.  Raffy demonstrated good  understanding of the education provided.     See EMR under Patient Instructions for exercises provided prior visit.     Raffy demonstrated good  understanding of the education provided.         Assessment   Raffy Rutherford Jr. is a 68 y.o. male referred to outpatient occupational/hand therapy and presents with a medical diagnosis of left distal radius fracture, resulting in pain, decreased range of motion and decreased functional use of left hand/wrist. Pt reports that he is able to move his wrist more with decreased pain. Advanced light activities with no difficulty.    Pt would continue to benefit from skilled OT.      Raffy is progressing well towards his goals and there are no updates to goals at this time. Pt prognosis is Excellent.     Pt will continue to benefit from skilled outpatient occupational therapy to address the deficits listed in the problem list on initial evaluation provide pt/family education and to maximize pt's level of independence in the home and community environment.     Anticipated barriers to occupational therapy: none    Pt's spiritual, cultural and educational needs  considered and pt agreeable to plan of care and goals.    Goals:  Short Term (4 weeks on 4/19/2019):  1)   Patient to be IND with HEP and modalities for pain management. Met 3/21/2019  2)   Increase ROM 15 to 20 degrees for wrist ext/flex  to increase functional hand use for playing his instrument.-progressing  3)   Measure  in 2-3 weeks.-progressing  4)   Decrease edema .2-.3 cm to increase joint mobility /flexibility for improved overall functional hand use.-progressing      Long Term (by discharge):  1)   Pt will report 2 out of 10 pain with playing his instrument.-progressing  2)   Patient to score at 40% or less on FOTO to demonstrate improved perception of functional left hand/wrist use.-progressing  3)   Pt will return to prior level of function for ADLs and household management.-progressing         Plan: Re-assess next session.   Certification Period/Plan of care expiration: 3/19/2019 to 5/19/2019.     Outpatient Occupational Therapy 2 times weekly for 8 weeks may include the following interventions: Paraffin, Fluidotherapy, Manual therapy/joint mobilizations, Modalities for pain management, Therapeutic exercises/activities., Strengthening and Edema Control.       Discussed Plan of Care with patient: Yes  Updates/Grading for next session: Advance as tolerated      Mary Shi, OT

## 2019-03-29 ENCOUNTER — PATIENT MESSAGE (OUTPATIENT)
Dept: NEUROSURGERY | Facility: CLINIC | Age: 69
End: 2019-03-29

## 2019-03-29 ENCOUNTER — CLINICAL SUPPORT (OUTPATIENT)
Dept: REHABILITATION | Facility: HOSPITAL | Age: 69
End: 2019-03-29
Attending: NEUROLOGICAL SURGERY
Payer: COMMERCIAL

## 2019-03-29 ENCOUNTER — CLINICAL SUPPORT (OUTPATIENT)
Dept: REHABILITATION | Facility: HOSPITAL | Age: 69
End: 2019-03-29
Attending: INTERNAL MEDICINE
Payer: COMMERCIAL

## 2019-03-29 DIAGNOSIS — K21.9 GASTROESOPHAGEAL REFLUX DISEASE, ESOPHAGITIS PRESENCE NOT SPECIFIED: ICD-10-CM

## 2019-03-29 DIAGNOSIS — M54.16 LUMBAR BACK PAIN WITH RADICULOPATHY AFFECTING LOWER EXTREMITY: ICD-10-CM

## 2019-03-29 DIAGNOSIS — M25.532 PAIN IN LEFT WRIST: ICD-10-CM

## 2019-03-29 PROCEDURE — 97113 AQUATIC THERAPY/EXERCISES: CPT

## 2019-03-29 PROCEDURE — 97110 THERAPEUTIC EXERCISES: CPT

## 2019-03-29 PROCEDURE — 97022 WHIRLPOOL THERAPY: CPT

## 2019-03-29 RX ORDER — RABEPRAZOLE SODIUM 20 MG/1
20 TABLET, DELAYED RELEASE ORAL 2 TIMES DAILY
Qty: 180 TABLET | Refills: 3 | Status: SHIPPED | OUTPATIENT
Start: 2019-03-29 | End: 2020-04-09 | Stop reason: SDUPTHER

## 2019-03-29 NOTE — PROGRESS NOTES
Physical Therapy Daily Treatment Note     Name: Raffy Rutherford Jr.  Clinic Number: [id number]    Therapy Diagnosis:   Low back pain  Physician: Jason Caldwell MD    Visit Date: 3/29/2019  Physician Orders: PT Eval and Treat Low back pain w/ bilat radiculopathy  Medical Diagnosis from Referral: SI joint dysfunction, s/p lumbar fusion, s/p R SHARAD, polyneuropathy  Evaluation Date: 11/15/2018  Authorization Period Expiration: 12/31/2019  Plan of Care Expiration: 4/8/19  Visit # / Visits authorized: 17/30    Time In: 0830  Time Out: 0930  Total Billable Time:15 minutes    Precautions: Standard    Subjective     Pt reports: he is was sicke earlier this week & had to cancel his appointment  He was compliant with home exercise program.  Response to previous treatment: no soreness  Functional change: using B AFO less with mobility    Pain: 3/10 legs, 1/10 back  Location: legs & back    Objective   Raffy sustained L distal radius fx while on vacation in [place] 1/2018 - has short arm cast(removed 3/6/2019) & replaced with velcro brace (covered with vinyl cast cover during aqua therapy)    Raffy received aquatic therapeutic exercises to develop strength, endurance, ROM, flexibility, posture and core stabilization for 60 minutes including:    WARMUP  Walking fwd/back/side x 2 laps    STRETCHES  HS  QS- standing      LE EX 30 oz weights  Mini Squat with QS x30  Heel raise with GS x30  Single leg heel raise x10-np  Fwd step up x 30-np  Lateral steps up x 30 with intermittent UE support-np  Step up/down/lateral on box x 10 each with single UE support  Hip hikes x 30-np  Walking lunges in // bars x 4 laps(VC for push off)  Hip abd/add x 30-np  Abduction walks in mini squat stance with B hip IR x 2 lap  Monster walks x 2 laps  Fwd steps up/down on 2 steps x 20 each step over step pattern  Running with push off on toes x 4 laps with 30-45 sec rest between each lap-np  Modified tandem for hitting ball with tennis racket(clinton,  backhand  &overhead)  X 5 minutes using RUE-np  Running backwards x 2 laps-VC for core stabization- np  Step up & over box x 20 each LE-np  Step up lat onto box x 20 each LE-np  Step up box with step down reverse lunge x 20 each LE  Obliques:Opposite elbow to knee  x15 each  Resistive walking  Fwd/back/side using jogging belt with PT holding green tubing x 4 each direction-np  Hip/Knee flex/ext with green noodle & inserted green tubiing x 25      Ankle stabilization:  Heel walks in // bars 2 laps   Toe walks in // bars with UE support 2 laps  Static standing in // bars in tandem stance 2x45 sec with each LE in the posterior position -np  Tandem walks fwd/back in // bars with occasional UE support 2 laps   Marching with 3 sec holds & focus on cocontraction of quads/hamstring with stance phase-np  Mini squat hold with chest press of disc x 20-np  Walking lunges with ball rotation x 2 laps-np  Braiding // bars x 2 laps (no external support)  Staggered stance for catch & throw ball(LOB x1) 2/2 fair activation of ankle df-np  Seated wonder board using BUE/LE to propel fwd/back/side x 3 laps each  Wobble board for fwd/back & laterally x 25 each direction  Wobble board for clockwise & ccw x 25 each direction  Wobble boars single leg fw/back/laterally & cw/ccw x 10 reps each LE  Seated wonderboard for propelling with heels backward & toes fwd x 2 laps each  Seated wonderboard for propelling with BLE sideways x 2 laps  Orange tb for ankle pf/eversion/inversion x 15 reps-np    Core: NP  Shoulder horizontal abd/add in modified tandem with apc 30x-np  Shoulder flx/ext in modified tandem with apc 30x-np  Shoulder extensions with 1 DB submersion just below surface in long lever arm position 3 x 30 sec each   Wall sits w/ aquatic 1 aquatic weights in long lever arm position 3x30 sec NP  Static standing in mini squat position with yellow ball submersion chest press 3x10 -np    ENDURANCE  Marching x 3 min-NP    COOL DOWN  1 lap  fwd/back/sideways    Manual spinal stretches for 3 x 15 sec-np  Patient brought SI stabilization belt as recommended by land PT; assisted patient in proper fit    Home Exercises Provided and Patient Education Provided     Education provided:   - knee to chest stretches & truncal rotations(HEP2Go [id number])    Written Home Exercises Provided: yes.  Exercises were reviewed and Raffy was able to demonstrate them prior to the end of the session.  Raffy demonstrated good  understanding of the education provided.     See EMR under Media for exercises provided 12/11/2018. Added additional ex of towel scrunches & single leg stance against wall(1/7/2019)     Assessment     Pt spencer tx with ther ex well, good form, posture, core stab tech with few VCs required, Modified some exercises 2/2 LUE with cast cover.. Unable to progress ex 2/2 just getting over illness.. No longer has slapping foot gait Raffy is highly motivated & participatory with PT treatment &  adherent to  HEP.  Raffy is progressing well towards his goals.   Pt prognosis is Good.     Pt will continue to benefit from skilled aquatic outpatient physical therapy to address the deficits listed in the problem list box on initial evaluation, provide pt/family education and to maximize pt's level of independence in the home and community environment.     Pt's spiritual, cultural and educational needs considered and pt agreeable to plan of care and goals.    Anticipated barriers to physical therapy: none    Goals:  Short Term Goals: 4 weeks   1. Pt will be able to tolerate 20 minutes of driving reporting no more than 10% increase in back pain/ radicular sx(progresing- not met)  2. Pt will be able to ascend/descend 12 steps with one railing reporting no increase in pain in order to be able to navigate to work(progressing - not met)  3. Pt will be independent with HEP and report compliance at least 5 days/week(MET 2/18/2019)     Long Term Goals: 8 weeks   1. Pt will  demonstrate B foot MMT of at least 4/5 throughout to demonstrate improved functional mobility(progressing - not met)  2. Pt will be able to tolerate 1 hour of standing reporting no increase in sx to be able to tolerate playing stand up bass(progressing - not met)  3. Pt will demonstrate independent gait with minimal gait deviations(progressing - not met)  Plan    increase weights as tolerated  Plan of care Certification: 11/15/2018 to 4/8/2019     Outpatient Physical Therapy 2 times weekly for 12 weeks to include the following interventions: Dry Needling, Aquatic Therapy, Cervical/Lumbar Traction, Electrical Stimulation TENS, Gait Training, Manual Therapy, Moist Heat/ Ice, Neuromuscular Re-ed, Orthotic Management and Training, Patient Education, Self Care, Therapeutic Activites, Therapeutic Exercise and Ultrasound.     Antonia Ca, PT

## 2019-03-29 NOTE — PROGRESS NOTES
"                            Occupational Therapy Daily Treatment Note     Date: 3/29/2019  Name: Raffy Rutherford Jr.  Clinic Number: [id number]    Therapy Diagnosis:   Encounter Diagnosis   Name Primary?    Pain in left wrist      Physician: Cici Carvajal, *    Physician Orders: Distal radius fx, non-Op treatment  Eval and Treat, modalities as needed  1-2 times/week for 6+ weeks  Medical Diagnosis:  S52.502A (ICD-10-CM Closed fracture of distal end of left radius, unspecified fracture morphology, initial encounter  Surgical Procedure and Date: N/A  Evaluation Date: 3/19/2019  Insurance: [facility] [employer]  Insurance Authorization period Expiration: 12/31/2019  Plan of Care Certification Period: 3/19/2019 to 5/19/2019     Visit # / Visits Authortized: 4 / 10  Time In: 10:00 AM  Time Out: 11:00 AM  Total Billable Time: 55 minutes  Charges: 3 TE, fluido     Precautions: Standard      Subjective     Pt reports: he was compliant with home exercise program given last session. He states that he has trouble playing his instrument without pain.  Response to previous treatment:"I'm moving more."  Functional change: Decreased pain with movement    Pain: 2/10  Location: left wrist      Objective   Edema. Measured in centimeters.    3/19/2019 3/19/2019 3/29/2019     Left Right Left   Proximal Wrist Crease 18 16.4 18   Mid palm 19.9 20.9 19.9   MCPs 20.5 21.4 20.5      Hand ROM: WNL     Sensation: Intact     Wrist ROM  Date 3/19/2019 3/19/2019 3/29/2018     Left Right Left   Supination/Pronation 80/90 85/90 80/90   Wrist ext/flex 40/20 70/75 50//25   Wrist RD/UD 20/20 35/50 25/20       Strength (Dyanmometer) and Pinch Strength (Pinch Gauge)  Measured in pounds and psi. Average of three trials.    3/19/2019 3/19/2019 3/29/2019 3/29/2019     Left Right Left Right   Rung II def def 43 85      Raffy received the following supervised modalities after being cleared for contradictions for 15 minutes:   -Patient received " fluidotherapy to left hand/wrist for 15 minutes to increase blood flow, circulation, desensitization, sensory re-education and for pain management.      Raffy received the following manual therapy techniques for 5 minutes:   -STM to left wrist     Raffy received therapeutic exercises for 40 minutes including:  -AROM as follows: composite fist, thumb opposition, wrist ext/flex, sup/pron and RD/UD x 10 reps  -wrist wheel 3 ' each wrist flex/ext and sup/pron  -dextersizer x 3'  -hand gripper with 3 yellow bands x 30 reps  -yellow theraflex bar, frowns/smiles and wrist ext/flex x 30 reps  -1 lb wrist 3 ways 1 min each   -red putty: 2' molding, 30 grasps    Home Exercises and Education Provided     Education provided:   - red putty  - Progress towards goals: Good     Written Home Exercises Provided: Patient instructed to cont prior HEP.  Exercises were reviewed and Raffy was able to demonstrate them prior to the end of the session.  Raffy demonstrated good  understanding of the education provided.     See EMR under Patient Instructions for exercises provided prior visit.     Raffy demonstrated good  understanding of the education provided.         Assessment   Raffy Rutherford Jr. is a 68 y.o. male referred to outpatient occupational/hand therapy and presents with a medical diagnosis of left distal radius fracture, resulting in pain, decreased range of motion and decreased functional use of left hand/wrist. Pt's AROM in his left wrist is improving. Edema remains unchanged.  Pt reports that he is able to move his wrist more with decreased pain. Advanced light activities with no difficulty. Measured  today.    Pt would continue to benefit from skilled OT.      Raffy is progressing well towards his goals and there are no updates to goals at this time. Pt prognosis is Excellent.     Pt will continue to benefit from skilled outpatient occupational therapy to address the deficits listed in the problem list on initial  evaluation provide pt/family education and to maximize pt's level of independence in the home and community environment.     Anticipated barriers to occupational therapy: none    Pt's spiritual, cultural and educational needs considered and pt agreeable to plan of care and goals.    Goals:  Short Term (4 weeks on 4/19/2019):  1)   Patient to be IND with HEP and modalities for pain management. Met 3/21/2019  2)   Increase ROM 15 to 20 degrees for wrist ext/flex  to increase functional hand use for playing his instrument.-progressing  3)   Measure  in 2-3 weeks.-progressing  4)   Decrease edema .2-.3 cm to increase joint mobility /flexibility for improved overall functional hand use.-progressing      Long Term (by discharge):  1)   Pt will report 2 out of 10 pain with playing his instrument.-progressing  2)   Patient to score at 40% or less on FOTO to demonstrate improved perception of functional left hand/wrist use.-progressing  3)   Pt will return to prior level of function for ADLs and household management.-progressing         Plan:    Certification Period/Plan of care expiration: 3/19/2019 to 5/19/2019.     Outpatient Occupational Therapy 2 times weekly for 8 weeks may include the following interventions: Paraffin, Fluidotherapy, Manual therapy/joint mobilizations, Modalities for pain management, Therapeutic exercises/activities., Strengthening and Edema Control.       Discussed Plan of Care with patient: Yes  Updates/Grading for next session: Advance as tolerated      Mary Shi, OT

## 2019-03-29 NOTE — PATIENT INSTRUCTIONS
[facility]  OCCUPATIONAL THERAPY  HOME EXERCISE PROGRAM     Complete the following strengthening program 2 x/day.                       _      RENA Mckenzie, EZIO  Certified Hand Therapist  Occupational Therapist

## 2019-04-01 DIAGNOSIS — M53.3 SI (SACROILIAC) JOINT DYSFUNCTION: Primary | ICD-10-CM

## 2019-04-02 ENCOUNTER — CLINICAL SUPPORT (OUTPATIENT)
Dept: REHABILITATION | Facility: HOSPITAL | Age: 69
End: 2019-04-02
Attending: NEUROLOGICAL SURGERY
Payer: COMMERCIAL

## 2019-04-02 ENCOUNTER — TELEPHONE (OUTPATIENT)
Dept: ORTHOPEDICS | Facility: CLINIC | Age: 69
End: 2019-04-02

## 2019-04-02 DIAGNOSIS — M25.532 PAIN IN LEFT WRIST: ICD-10-CM

## 2019-04-02 PROCEDURE — 97110 THERAPEUTIC EXERCISES: CPT

## 2019-04-02 PROCEDURE — 97022 WHIRLPOOL THERAPY: CPT

## 2019-04-02 NOTE — PROGRESS NOTES
"                            Occupational Therapy Daily Treatment Note     Date: 4/2/2019  Name: Raffy Rutherford Jr.  Clinic Number: [id number]    Therapy Diagnosis:   Encounter Diagnosis   Name Primary?    Pain in left wrist      Physician: Cici Carvajal, *    Physician Orders: Distal radius fx, non-Op treatment  Eval and Treat, modalities as needed  1-2 times/week for 6+ weeks  Medical Diagnosis:  S52.502A (ICD-10-CM Closed fracture of distal end of left radius, unspecified fracture morphology, initial encounter  Surgical Procedure and Date: N/A  Evaluation Date: 3/19/2019  Insurance: [facility] [employer]  Insurance Authorization period Expiration: 12/31/2019  Plan of Care Certification Period: 3/19/2019 to 5/19/2019     Visit # / Visits Authortized: 5 / 10  Time In: 1:00 PM  Time Out: 2:00 PM  Total Billable Time: 55 minutes  Charges: 3 TE, fluido     Precautions: Standard      Subjective     Pt reports: he was compliant with home exercise program given last session. He states that he has trouble playing his instrument without pain. Pt states that the red putty caused him increased pain.  Response to previous treatment:"I'm moving more."  Functional change: Decreased pain with movement    Pain: 2/10  Location: left wrist      Objective   Edema. Measured in centimeters.    3/19/2019 3/19/2019 3/29/2019     Left Right Left   Proximal Wrist Crease 18 16.4 18   Mid palm 19.9 20.9 19.9   MCPs 20.5 21.4 20.5      Hand ROM: WNL     Sensation: Intact     Wrist ROM  Date 3/19/2019 3/19/2019 3/29/2018     Left Right Left   Supination/Pronation 80/90 85/90 80/90   Wrist ext/flex 40/20 70/75 50//25   Wrist RD/UD 20/20 35/50 25/20       Strength (Dyanmometer) and Pinch Strength (Pinch Gauge)  Measured in pounds and psi. Average of three trials.    3/19/2019 3/19/2019 3/29/2019 3/29/2019     Left Right Left Right   Rung II def def 43 85        CMS Impairment/Limitation/Restriction for FOTO Hand Survey    Therapist reviewed " FOTO scores for Raffy Rutherford Jr. on 4/2/2019.   FOTO documents entered into [employer] - see Media section.    Limitation Score: 59%  Category: Carrying    Current : CK = at least 40% but < 60% impaired, limited or restricted  Goal: CK = at least 40% but < 60% impaired, limited or restricted         Raffy received the following supervised modalities after being cleared for contradictions for 15 minutes:   -Patient received fluidotherapy to left hand/wrist for 15 minutes to increase blood flow, circulation, desensitization, sensory re-education and for pain management.      Raffy received the following manual therapy techniques for 5 minutes:   -STM to left wrist     Raffy received therapeutic exercises for 40 minutes including:  -AROM as follows: composite fist, thumb opposition, wrist ext/flex, sup/pron and RD/UD x 10 reps  -wrist wheel 3 ' each wrist flex/ext and sup/pron  -dextersizer x 3'  -hand gripper with 3 yellow bands x 30 reps  -yellow theraflex bar, frowns/smiles and wrist ext/flex x 30 reps  -1 lb wrist 3 ways 1 min each   -yellow putty: 2' molding, 30 grasps    Home Exercises and Education Provided     Education provided:   - none today  - Progress towards goals: Good     Written Home Exercises Provided: Patient instructed to cont prior HEP.  Exercises were reviewed and Raffy was able to demonstrate them prior to the end of the session.  Raffy demonstrated good  understanding of the education provided.     See EMR under Patient Instructions for exercises provided prior visit.     Raffy demonstrated good  understanding of the education provided.         Assessment   Raffy Rutherford Jr. is a 68 y.o. male referred to outpatient occupational/hand therapy and presents with a medical diagnosis of left distal radius fracture, resulting in pain, decreased range of motion and decreased functional use of left hand/wrist. Decreased putty strength to yellow. Added 1 lb wrist weight without difficulty.    Pt would  continue to benefit from skilled OT.      Raffy is progressing well towards his goals and there are no updates to goals at this time. Pt prognosis is Excellent.     Pt will continue to benefit from skilled outpatient occupational therapy to address the deficits listed in the problem list on initial evaluation provide pt/family education and to maximize pt's level of independence in the home and community environment.     Anticipated barriers to occupational therapy: none    Pt's spiritual, cultural and educational needs considered and pt agreeable to plan of care and goals.    Goals:  Short Term (4 weeks on 4/19/2019):  1)   Patient to be IND with HEP and modalities for pain management. Met 3/21/2019  2)   Increase ROM 15 to 20 degrees for wrist ext/flex  to increase functional hand use for playing his instrument.-progressing  3)   Measure  in 2-3 weeks.-progressing  4)   Decrease edema .2-.3 cm to increase joint mobility /flexibility for improved overall functional hand use.-progressing      Long Term (by discharge):  1)   Pt will report 2 out of 10 pain with playing his instrument.-progressing  2)   Patient to score at 40% or less on FOTO to demonstrate improved perception of functional left hand/wrist use.-progressing  3)   Pt will return to prior level of function for ADLs and household management.-progressing         Plan:    Certification Period/Plan of care expiration: 3/19/2019 to 5/19/2019.     Outpatient Occupational Therapy 2 times weekly for 8 weeks may include the following interventions: Paraffin, Fluidotherapy, Manual therapy/joint mobilizations, Modalities for pain management, Therapeutic exercises/activities., Strengthening and Edema Control.       Discussed Plan of Care with patient: Yes  Updates/Grading for next session: Advance as tolerated      Mary Shi, OT

## 2019-04-03 ENCOUNTER — CLINICAL SUPPORT (OUTPATIENT)
Dept: REHABILITATION | Facility: HOSPITAL | Age: 69
End: 2019-04-03
Attending: NEUROLOGICAL SURGERY
Payer: COMMERCIAL

## 2019-04-03 DIAGNOSIS — M54.16 LUMBAR BACK PAIN WITH RADICULOPATHY AFFECTING LOWER EXTREMITY: ICD-10-CM

## 2019-04-03 PROCEDURE — 97113 AQUATIC THERAPY/EXERCISES: CPT

## 2019-04-03 NOTE — PROGRESS NOTES
Physical Therapy Daily Treatment Note     Name: Raffy Rutherford Jr.  Clinic Number: [id number]    Therapy Diagnosis:   Low back pain  Physician: Jason Caldwell MD    Visit Date: 4/3/2019  Physician Orders: PT Eval and Treat Low back pain w/ bilat radiculopathy  Medical Diagnosis from Referral: SI joint dysfunction, s/p lumbar fusion, s/p R SHARAD, polyneuropathy  Evaluation Date: 11/15/2018  Authorization Period Expiration: 12/31/2019  Plan of Care Expiration: 4/8/19  Visit # / Visits authorized: 18/30    Time In: 1100  Time Out: 1200  Total Billable Time:30 minutes    Precautions: Standard    Subjective     Pt reports: he is still suffering with seasonal allergies  He was compliant with home exercise program.  Response to previous treatment: no soreness  Functional change: using B AFO less with mobility    Pain: 3/10 legs & back  Location: legs & back    Objective   Raffy sustained L distal radius fx while on vacation in [place] 1/2018 - has short arm cast(removed 3/6/2019) & replaced with velcro brace (covered with vinyl cast cover during aqua therapy)    Raffy received aquatic therapeutic exercises to develop strength, endurance, ROM, flexibility, posture and core stabilization for 60 minutes including:    WARMUP  Walking fwd/back/side x 2 laps    STRETCHES  HS  QS- standing      LE EX 30 oz weights  Mini Squat with QS x30  Heel raise with GS x30  Single leg heel raise x10-np  Lateral steps up x 30 with intermittent UE support-np  Hip hikes x 30-np  Walking lunges in // bars x 4 laps(VC for push off)  Hip abd/add x 30-np  Abduction walks in mini squat stance with B hip IR x 2 lap  Monster walks x 2 laps  Fwd steps up/down on 2 steps x 20 each step over step pattern  Running with push off on toes x 4 laps with 30-45 sec rest between each lap-np  Modified tandem for hitting ball with tennis racket(clinton, backhand  &overhead)  X 5 minutes using RUE-np  Running backwards x 2 laps-VC for core stabization- np  Step  up & over box x 20 each LE  Step up lat onto box x 20 each LE  Step up box with step down reverse lunge x 20 each LE  Obliques:Opposite elbow to knee  x15 each  Resistive walking  Fwd/back/side using jogging belt with PT holding green tubing x 4 each direction-np  Hip/Knee flex/ext with green noodle & inserted green tubiing x 25      Ankle stabilization:  Heel walks in // bars 2 laps   Toe walks in // bars with UE support 2 laps  Static standing in // bars in tandem stance 2x45 sec with each LE in the posterior position -np  Tandem walks fwd/back in // bars with occasional UE support 2 laps   Marching with 3 sec holds & focus on cocontraction of quads/hamstring with stance phase-np  Mini squat hold with chest press of disc x 20-np  Walking lunges with ball rotation x 2 laps-np  Braiding // bars x 2 laps (no external support)  Staggered stance for catch & throw ball(LOB x1) 2/2 fair activation of ankle df-np  Seated wonder board using BUE/LE to propel fwd/back/side x 3 laps each  Wobble board for fwd/back & laterally x 25 each direction  Wobble board for clockwise & ccw x 25 each direction  Wobble boars single leg fw/back/laterally & cw/ccw x 10 reps each LE  Seated wonderboard for propelling with heels backward & toes fwd x 2 laps each  Seated wonderboard for propelling with BLE sideways x 2 laps  Orange tb for ankle pf/eversion/inversion x 15 reps-np    Core: NP  Shoulder horizontal abd/add in modified tandem with apc 30x-np  Shoulder flx/ext in modified tandem with apc 30x-np  Shoulder extensions with 1 DB submersion just below surface in long lever arm position 3 x 30 sec each   Wall sits w/ aquatic 1 aquatic weights in long lever arm position 3x30 sec NP  Static standing in mini squat position with yellow ball submersion chest press 3x10 -np    ENDURANCE  Marching x 3 min-NP    COOL DOWN  1 lap fwd/back/sideways    Manual spinal stretches for 3 x 15 sec-np  Patient brought SI stabilization belt as recommended by  [facility]; assisted patient in proper fit    Home Exercises Provided and Patient Education Provided     Education provided:   - knee to chest stretches & truncal rotations([id number] [id number])    Written Home Exercises Provided: yes.  Exercises were reviewed and Raffy was able to demonstrate them prior to the end of the session.  Raffy demonstrated good  understanding of the education provided.     See EMR under Media for exercises provided 12/11/2018. Added additional ex of towel scrunches & single leg stance against wall(1/7/2019)     Assessment     Pt spencer tx with ther ex well, good form, posture, core stab tech with few VCs required, Modified some exercises 2/2 LUE limited weightbearing... Still with some instability LLE >RLE when balance is challenged.. No longer has slapping foot gait Raffy is highly motivated & participatory with PT treatment &  adherent to  HEP.  Raffy is progressing well towards his goals.   Pt prognosis is Good.     Pt will continue to benefit from skilled aquatic outpatient physical therapy to address the deficits listed in the problem list box on initial evaluation, provide pt/family education and to maximize pt's level of independence in the home and community environment.     Pt's spiritual, cultural and educational needs considered and pt agreeable to plan of care and goals.    Anticipated barriers to physical therapy: none    Goals:  Short Term Goals: 4 weeks   1. Pt will be able to tolerate 20 minutes of driving reporting no more than 10% increase in back pain/ radicular sx(progresing- not met)  2. Pt will be able to ascend/descend 12 steps with one railing reporting no increase in pain in order to be able to navigate to work(progressing - not met)  3. Pt will be independent with HEP and report compliance at least 5 days/week(MET 2/18/2019)     Long Term Goals: 8 weeks   1. Pt will demonstrate B foot MMT of at least 4/5 throughout to demonstrate improved functional mobility(progressing  - not met)  2. Pt will be able to tolerate 1 hour of standing reporting no increase in sx to be able to tolerate playing stand up bass(progressing - not met)  3. Pt will demonstrate independent gait with minimal gait deviations(progressing - not met)  Plan   Initiate more balance exercises as tolerated    Plan of care Certification: 11/15/2018 to 4/8/2019     Outpatient Physical Therapy 2 times weekly for 12 weeks to include the following interventions: Dry Needling, Aquatic Therapy, Cervical/Lumbar Traction, Electrical Stimulation TENS, Gait Training, Manual Therapy, Moist Heat/ Ice, Neuromuscular Re-ed, Orthotic Management and Training, Patient Education, Self Care, Therapeutic Activites, Therapeutic Exercise and Ultrasound.     Antonia Ca, PT

## 2019-04-05 ENCOUNTER — CLINICAL SUPPORT (OUTPATIENT)
Dept: REHABILITATION | Facility: HOSPITAL | Age: 69
End: 2019-04-05
Attending: NEUROLOGICAL SURGERY
Payer: COMMERCIAL

## 2019-04-05 DIAGNOSIS — M25.532 PAIN IN LEFT WRIST: ICD-10-CM

## 2019-04-05 PROCEDURE — 97022 WHIRLPOOL THERAPY: CPT

## 2019-04-05 PROCEDURE — 97110 THERAPEUTIC EXERCISES: CPT

## 2019-04-05 NOTE — PROGRESS NOTES
Occupational Therapy Daily Treatment Note     Date: 4/5/2019  Name: Raffy Rutherford Jr.  Clinic Number: [id number]    Therapy Diagnosis:   Encounter Diagnosis   Name Primary?    Pain in left wrist      Physician: Cici Carvajal, *    Physician Orders: Distal radius fx, non-Op treatment  Eval and Treat, modalities as needed  1-2 times/week for 6+ weeks  Medical Diagnosis:  S52.502A (ICD-10-CM Closed fracture of distal end of left radius, unspecified fracture morphology, initial encounter  Surgical Procedure and Date: N/A  Evaluation Date: 3/19/2019  Insurance: [facility] [employer]  Insurance Authorization period Expiration: 12/31/2019  Plan of Care Certification Period: 3/19/2019 to 5/19/2019     Visit # / Visits Authortized: 6 / 10  Time In: 10:00 AM  Time Out: 11:00 AM  Time: 55 minutes  Charges: 3 TE, fluido     Precautions: Standard      Subjective     Pt reports: he was compliant with home exercise program given last session. He states that he has trouble playing his instrument without pain.   Response to previous treatment: Pt is playing his instrument more.   Functional change: Decreased pain with movement    Pain: 2/10  Location: left wrist      Objective   Edema. Measured in centimeters.    3/19/2019 3/19/2019 3/29/2019     Left Right Left   Proximal Wrist Crease 18 16.4 18   Mid palm 19.9 20.9 19.9   MCPs 20.5 21.4 20.5      Hand ROM: WNL     Sensation: Intact     Wrist ROM  Date 3/19/2019 3/19/2019 3/29/2018     Left Right Left   Supination/Pronation 80/90 85/90 80/90   Wrist ext/flex 40/20 70/75 50//25   Wrist RD/UD 20/20 35/50 25/20       Strength (Dyanmometer) and Pinch Strength (Pinch Gauge)  Measured in pounds and psi. Average of three trials.    3/19/2019 3/19/2019 3/29/2019 3/29/2019     Left Right Left Right   Rung II def def 43 85        Raffy received the following supervised modalities after being cleared for contradictions for 15 minutes:   -Patient received  fluidotherapy to left hand/wrist for 15 minutes to increase blood flow, circulation, desensitization, sensory re-education and for pain management.      Raffy received the following manual therapy techniques for 5 minutes:   -STM to left wrist     Raffy received therapeutic exercises for 40 minutes including:  -AROM as follows: composite fist, thumb opposition, wrist ext/flex, sup/pron and RD/UD x 10 reps  -wrist wheel 3 ' each wrist flex/ext and sup/pron  -dextersizer x 3'  -hand gripper with 3 yellow bands x 30 reps  -yellow theraflex bar, frowns/smiles and wrist ext/flex x 30 reps  -1 lb wrist 3 ways 1 min each   -yellow putty: 2' molding, 30 grasps    Home Exercises and Education Provided     Education provided:   - none today  - Progress towards goals: Good     Written Home Exercises Provided: Patient instructed to cont prior HEP.  Exercises were reviewed and Raffy was able to demonstrate them prior to the end of the session.  Raffy demonstrated good  understanding of the education provided.     See EMR under Patient Instructions for exercises provided prior visit.     Raffy demonstrated good  understanding of the education provided.         Assessment   Raffy Rutherford Jr. is a 68 y.o. male referred to outpatient occupational/hand therapy and presents with a medical diagnosis of left distal radius fracture, resulting in pain, decreased range of motion and decreased functional use of left hand/wrist. Continued with exercises without difficulty.    Pt would continue to benefit from skilled OT.      Raffy is progressing well towards his goals and there are no updates to goals at this time. Pt prognosis is Excellent.     Pt will continue to benefit from skilled outpatient occupational therapy to address the deficits listed in the problem list on initial evaluation provide pt/family education and to maximize pt's level of independence in the home and community environment.     Anticipated barriers to  occupational therapy: none    Pt's spiritual, cultural and educational needs considered and pt agreeable to plan of care and goals.    Goals:  Short Term (4 weeks on 4/19/2019):  1)   Patient to be IND with HEP and modalities for pain management. Met 3/21/2019  2)   Increase ROM 15 to 20 degrees for wrist ext/flex  to increase functional hand use for playing his instrument.-progressing  3)   Measure  in 2-3 weeks.-progressing  4)   Decrease edema .2-.3 cm to increase joint mobility /flexibility for improved overall functional hand use.-progressing      Long Term (by discharge):  1)   Pt will report 2 out of 10 pain with playing his instrument.-progressing  2)   Patient to score at 40% or less on FOTO to demonstrate improved perception of functional left hand/wrist use.-progressing  3)   Pt will return to prior level of function for ADLs and household management.-progressing         Plan: Cont with OT   Certification Period/Plan of care expiration: 3/19/2019 to 5/19/2019.     Outpatient Occupational Therapy 2 times weekly for 8 weeks may include the following interventions: Paraffin, Fluidotherapy, Manual therapy/joint mobilizations, Modalities for pain management, Therapeutic exercises/activities., Strengthening and Edema Control.       Discussed Plan of Care with patient: Yes  Updates/Grading for next session: Advance as tolerated      Mary Shi, OT

## 2019-04-08 ENCOUNTER — CLINICAL SUPPORT (OUTPATIENT)
Dept: REHABILITATION | Facility: HOSPITAL | Age: 69
End: 2019-04-08
Attending: NEUROLOGICAL SURGERY
Payer: COMMERCIAL

## 2019-04-08 PROCEDURE — 97022 WHIRLPOOL THERAPY: CPT

## 2019-04-08 PROCEDURE — 97110 THERAPEUTIC EXERCISES: CPT

## 2019-04-08 NOTE — PROGRESS NOTES
Occupational Therapy Daily Treatment Note     Date: 4/8/2019  Name: Raffy Rutherford Jr.  Clinic Number: [id number]    Therapy Diagnosis:   No diagnosis found.  Physician: Cici Carvajal, *    Physician Orders: Distal radius fx, non-Op treatment  Eval and Treat, modalities as needed  1-2 times/week for 6+ weeks  Medical Diagnosis:  S52.502A (ICD-10-CM Closed fracture of distal end of left radius, unspecified fracture morphology, initial encounter  Surgical Procedure and Date: N/A  Evaluation Date: 3/19/2019  Insurance: [facility] [employer]  Insurance Authorization period Expiration: 12/31/2019  Plan of Care Certification Period: 3/19/2019 to 5/19/2019     Visit # / Visits Authortized: 7 / 10  Time In: 2:15 PM  Time Out: 3:15 PM  Time: 55 minutes  Charges: 3 TE, fluido     Precautions: Standard      Subjective     Pt reports: he was compliant with home exercise program given last session. He states that he has trouble playing his instrument without pain.   Response to previous treatment: Pt is playing his instrument more.   Functional change: Decreased pain with movement    Pain: 2/10  Location: left wrist      Objective   Edema. Measured in centimeters.    3/19/2019 3/19/2019 3/29/2019     Left Right Left   Proximal Wrist Crease 18 16.4 18   Mid palm 19.9 20.9 19.9   MCPs 20.5 21.4 20.5      Hand ROM: WNL     Sensation: Intact     Wrist ROM  Date 3/19/2019 3/19/2019 3/29/2018     Left Right Left   Supination/Pronation 80/90 85/90 80/90   Wrist ext/flex 40/20 70/75 50//25   Wrist RD/UD 20/20 35/50 25/20       Strength (Dyanmometer) and Pinch Strength (Pinch Gauge)  Measured in pounds and psi. Average of three trials.    3/19/2019 3/19/2019 3/29/2019 3/29/2019     Left Right Left Right   Rung II def def 43 85        Raffy received the following supervised modalities after being cleared for contradictions for 15 minutes:   -Patient received fluidotherapy to left hand/wrist for 15 minutes  to increase blood flow, circulation, desensitization, sensory re-education and for pain management.      Raffy received the following manual therapy techniques for 5 minutes:   -STM to left wrist     Raffy received therapeutic exercises for 40 minutes including:  -AROM as follows: composite fist, thumb opposition, wrist ext/flex, sup/pron and RD/UD x 10 reps  -wrist wheel 3 ' each wrist flex/ext and sup/pron  -dextersizer x 3'  -hand gripper with 3 yellow bands x 30 reps  -yellow theraflex bar, frowns/smiles and wrist ext/flex x 30 reps  -1 lb wrist 3 ways 1 min each   -yellow putty: 2' molding, 30 grasps    Home Exercises and Education Provided     Education provided:   - none today  - Progress towards goals: Good     Written Home Exercises Provided: Patient instructed to cont prior HEP.  Exercises were reviewed and Raffy was able to demonstrate them prior to the end of the session.  Raffy demonstrated good  understanding of the education provided.     See EMR under Patient Instructions for exercises provided prior visit.     Raffy demonstrated good  understanding of the education provided.         Assessment   Raffy Rutherford Jr. is a 68 y.o. male referred to outpatient occupational/hand therapy and presents with a medical diagnosis of left distal radius fracture, resulting in pain, decreased range of motion and decreased functional use of left hand/wrist. Continued with exercises without difficulty.    Pt would continue to benefit from skilled OT.      Raffy is progressing well towards his goals and there are no updates to goals at this time. Pt prognosis is Excellent.     Pt will continue to benefit from skilled outpatient occupational therapy to address the deficits listed in the problem list on initial evaluation provide pt/family education and to maximize pt's level of independence in the home and community environment.     Anticipated barriers to occupational therapy: none    Pt's spiritual, cultural  and educational needs considered and pt agreeable to plan of care and goals.    Goals:  Short Term (4 weeks on 4/19/2019):  1)   Patient to be IND with HEP and modalities for pain management. Met 3/21/2019  2)   Increase ROM 15 to 20 degrees for wrist ext/flex  to increase functional hand use for playing his instrument.-progressing  3)   Measure  in 2-3 weeks.-progressing  4)   Decrease edema .2-.3 cm to increase joint mobility /flexibility for improved overall functional hand use.-progressing      Long Term (by discharge):  1)   Pt will report 2 out of 10 pain with playing his instrument.-progressing  2)   Patient to score at 40% or less on FOTO to demonstrate improved perception of functional left hand/wrist use.-progressing  3)   Pt will return to prior level of function for ADLs and household management.-progressing         Plan: Cont with OT   Certification Period/Plan of care expiration: 3/19/2019 to 5/19/2019.     Outpatient Occupational Therapy 2 times weekly for 8 weeks may include the following interventions: Paraffin, Fluidotherapy, Manual therapy/joint mobilizations, Modalities for pain management, Therapeutic exercises/activities., Strengthening and Edema Control.       Discussed Plan of Care with patient: Yes  Updates/Grading for next session: Advance as tolerated      Mary Shi, OT

## 2019-04-10 ENCOUNTER — TELEPHONE (OUTPATIENT)
Dept: NEUROSURGERY | Facility: CLINIC | Age: 69
End: 2019-04-10

## 2019-04-10 ENCOUNTER — DOCUMENTATION ONLY (OUTPATIENT)
Dept: REHABILITATION | Facility: HOSPITAL | Age: 69
End: 2019-04-10

## 2019-04-10 NOTE — PROGRESS NOTES
Received message re: Patient has reached his max PT visits allowed for this calendar year. PT was unable to see Raffy for final assessment. Raffy was seen 1-2x/wk for land based & aquatic physical therapy since 2018 2/2 LE weakness & low back pain after surgical fixation lumbar spine. Raffy received aquatic therapy, education in home exercise program & therapeutic exercises. Raffy made gains in BLE strength, fluidity of gait & increased ability to perform ADLs. Raffy will benefit from transfer to land based PT for continued focused on ankle stability, proprioception & strengthening to normalize gait.

## 2019-04-10 NOTE — TELEPHONE ENCOUNTER
Spoke with Sivan cardona/ Medicare, states no prior authorization nor referral  is needed for physical therapy. States as long as the site accepts medicare- patient should be able to proceed with PT.     Per Niru with [facility], states medicare is accepted and will forward information re: patients insurance.

## 2019-04-11 ENCOUNTER — CLINICAL SUPPORT (OUTPATIENT)
Dept: REHABILITATION | Facility: HOSPITAL | Age: 69
End: 2019-04-11
Attending: NEUROLOGICAL SURGERY
Payer: COMMERCIAL

## 2019-04-11 DIAGNOSIS — M25.532 PAIN IN LEFT WRIST: ICD-10-CM

## 2019-04-11 PROCEDURE — 97022 WHIRLPOOL THERAPY: CPT

## 2019-04-11 PROCEDURE — 97110 THERAPEUTIC EXERCISES: CPT

## 2019-04-11 NOTE — PROGRESS NOTES
Occupational Therapy Daily Treatment Note     Date: 4/11/2019  Name: Raffy Rutherford Jr.  Clinic Number: [id number]    Therapy Diagnosis:   Encounter Diagnosis   Name Primary?    Pain in left wrist      Physician: Cici Carvajal, *    Physician Orders: Distal radius fx, non-Op treatment  Eval and Treat, modalities as needed  1-2 times/week for 6+ weeks  Medical Diagnosis:  S52.502A (ICD-10-CM Closed fracture of distal end of left radius, unspecified fracture morphology, initial encounter  Surgical Procedure and Date: N/A  Evaluation Date: 3/19/2019  Insurance: [facility] [employer]  Insurance Authorization period Expiration: 12/31/2019  Plan of Care Certification Period: 3/19/2019 to 5/19/2019     Visit # / Visits Authortized: 8 / 10  Time In: 2:05 PM  Time Out: 3:00PM  Time: 50 minutes  Charges: 2 TE, fluido     Precautions: Standard      Subjective     Pt reports: he was compliant with home exercise program given last session. He states that he has trouble playing his instrument without pain.   Response to previous treatment: Pt is playing his instrument more.   Functional change: Decreased pain with movement    Pain: 2/10  Location: left wrist      Objective   Edema. Measured in centimeters.    3/19/2019 3/19/2019 3/29/2019     Left Right Left   Proximal Wrist Crease 18 16.4 18   Mid palm 19.9 20.9 19.9   MCPs 20.5 21.4 20.5      Hand ROM: WNL     Sensation: Intact     Wrist ROM  Date 3/19/2019 3/19/2019 3/29/2018     Left Right Left   Supination/Pronation 80/90 85/90 80/90   Wrist ext/flex 40/20 70/75 50//25   Wrist RD/UD 20/20 35/50 25/20       Strength (Dyanmometer) and Pinch Strength (Pinch Gauge)  Measured in pounds and psi. Average of three trials.    3/19/2019 3/19/2019 3/29/2019 3/29/2019     Left Right Left Right   Rung II def def 43 85        Raffy received the following supervised modalities after being cleared for contradictions for 15 minutes:   -Patient received  fluidotherapy to left hand/wrist for 15 minutes to increase blood flow, circulation, desensitization, sensory re-education and for pain management.      Raffy received the following manual therapy techniques for 5 minutes:   -STM to left wrist     Raffy received therapeutic exercises for 35 minutes including:  -AROM as follows: composite fist, thumb opposition, wrist ext/flex, sup/pron and RD/UD x 10 reps  -wrist wheel 3 ' each wrist flex/ext and sup/pron  -dextersizer x 3'  -hand gripper with 3 yellow bands x 30 reps  -yellow theraflex bar, frowns/smiles and wrist ext/flex x 30 reps (NT)  -1 lb wrist 3 ways 1 min each (NT)  -yellow putty: 2' molding, 30 grasps    Home Exercises and Education Provided     Education provided:   - Pt tried on bullseye brace for ulnar sided pain. He states that it helps.   - Progress towards goals: Good     Written Home Exercises Provided: Patient instructed to cont prior HEP.  Exercises were reviewed and Raffy was able to demonstrate them prior to the end of the session.  Raffy demonstrated good  understanding of the education provided.     See EMR under Patient Instructions for exercises provided prior visit.     Raffy demonstrated good  understanding of the education provided.         Assessment   Raffy Rutherford Jr. is a 68 y.o. male referred to outpatient occupational/hand therapy and presents with a medical diagnosis of left distal radius fracture, resulting in pain, decreased range of motion and decreased functional use of left hand/wrist. Continued with exercises without difficulty.    Pt would continue to benefit from skilled OT.      Raffy is progressing well towards his goals and there are no updates to goals at this time. Pt prognosis is Excellent.     Pt will continue to benefit from skilled outpatient occupational therapy to address the deficits listed in the problem list on initial evaluation provide pt/family education and to maximize pt's level of independence in  the home and community environment.     Anticipated barriers to occupational therapy: none    Pt's spiritual, cultural and educational needs considered and pt agreeable to plan of care and goals.    Goals:  Short Term (4 weeks on 4/19/2019):  1)   Patient to be IND with HEP and modalities for pain management. Met 3/21/2019  2)   Increase ROM 15 to 20 degrees for wrist ext/flex  to increase functional hand use for playing his instrument.-progressing  3)   Measure  in 2-3 weeks.-progressing  4)   Decrease edema .2-.3 cm to increase joint mobility /flexibility for improved overall functional hand use.-progressing      Long Term (by discharge):  1)   Pt will report 2 out of 10 pain with playing his instrument.-progressing  2)   Patient to score at 40% or less on FOTO to demonstrate improved perception of functional left hand/wrist use.-progressing  3)   Pt will return to prior level of function for ADLs and household management.-progressing         Plan: Cont with OT   Certification Period/Plan of care expiration: 3/19/2019 to 5/19/2019.     Outpatient Occupational Therapy 2 times weekly for 8 weeks may include the following interventions: Paraffin, Fluidotherapy, Manual therapy/joint mobilizations, Modalities for pain management, Therapeutic exercises/activities., Strengthening and Edema Control.       Discussed Plan of Care with patient: Yes  Updates/Grading for next session: Advance as tolerated      Mary Shi, OT

## 2019-04-16 ENCOUNTER — TELEPHONE (OUTPATIENT)
Dept: ORTHOPEDICS | Facility: CLINIC | Age: 69
End: 2019-04-16

## 2019-04-17 ENCOUNTER — OFFICE VISIT (OUTPATIENT)
Dept: ORTHOPEDICS | Facility: CLINIC | Age: 69
End: 2019-04-17
Payer: COMMERCIAL

## 2019-04-17 ENCOUNTER — HOSPITAL ENCOUNTER (OUTPATIENT)
Dept: RADIOLOGY | Facility: OTHER | Age: 69
Discharge: HOME | End: 2019-04-17
Attending: PHYSICIAN ASSISTANT
Payer: COMMERCIAL

## 2019-04-17 VITALS
HEART RATE: 51 BPM | DIASTOLIC BLOOD PRESSURE: 72 MMHG | BODY MASS INDEX: 26.37 KG/M2 | SYSTOLIC BLOOD PRESSURE: 112 MMHG | HEIGHT: 68 IN | WEIGHT: 174 LBS

## 2019-04-17 DIAGNOSIS — Z47.89 ORTHOPEDIC AFTERCARE: ICD-10-CM

## 2019-04-17 DIAGNOSIS — S52.502A CLOSED FRACTURE OF DISTAL END OF LEFT RADIUS, UNSPECIFIED FRACTURE MORPHOLOGY, INITIAL ENCOUNTER: Primary | ICD-10-CM

## 2019-04-17 DIAGNOSIS — S52.502A CLOSED FRACTURE OF DISTAL END OF LEFT RADIUS, UNSPECIFIED FRACTURE MORPHOLOGY, INITIAL ENCOUNTER: ICD-10-CM

## 2019-04-17 PROCEDURE — 3288F PR FALLS RISK ASSESSMENT DOCUMENTED: ICD-10-PCS | Mod: CPTII,S$GLB,, | Performed by: PHYSICIAN ASSISTANT

## 2019-04-17 PROCEDURE — 1100F PR PT FALLS ASSESS DOC 2+ FALLS/FALL W/INJURY/YR: ICD-10-PCS | Mod: CPTII,S$GLB,, | Performed by: PHYSICIAN ASSISTANT

## 2019-04-17 PROCEDURE — 1100F PTFALLS ASSESS-DOCD GE2>/YR: CPT | Mod: CPTII,S$GLB,, | Performed by: PHYSICIAN ASSISTANT

## 2019-04-17 PROCEDURE — 73110 X-RAY EXAM OF WRIST: CPT | Mod: 26,LT,, | Performed by: RADIOLOGY

## 2019-04-17 PROCEDURE — 99999 PR PBB SHADOW E&M-EST. PATIENT-LVL IV: CPT | Mod: PBBFAC,,, | Performed by: PHYSICIAN ASSISTANT

## 2019-04-17 PROCEDURE — 73110 X-RAY EXAM OF WRIST: CPT | Mod: TC,FY,LT

## 2019-04-17 PROCEDURE — 3288F FALL RISK ASSESSMENT DOCD: CPT | Mod: CPTII,S$GLB,, | Performed by: PHYSICIAN ASSISTANT

## 2019-04-17 PROCEDURE — 99213 PR OFFICE/OUTPT VISIT, EST, LEVL III, 20-29 MIN: ICD-10-PCS | Mod: S$GLB,,, | Performed by: PHYSICIAN ASSISTANT

## 2019-04-17 PROCEDURE — 73110 XR WRIST COMPLETE 3 VIEWS LEFT: ICD-10-PCS | Mod: 26,LT,, | Performed by: RADIOLOGY

## 2019-04-17 PROCEDURE — 99999 PR PBB SHADOW E&M-EST. PATIENT-LVL IV: ICD-10-PCS | Mod: PBBFAC,,, | Performed by: PHYSICIAN ASSISTANT

## 2019-04-17 PROCEDURE — 99213 OFFICE O/P EST LOW 20 MIN: CPT | Mod: S$GLB,,, | Performed by: PHYSICIAN ASSISTANT

## 2019-04-17 NOTE — PROGRESS NOTES
Subjective:      Patient ID: Raffy Rutherford Jr. is a 69 y.o. male.    Chief Complaint: No chief complaint on file.      HPI  Raffy Rutherford Jr. is a right hand dominant 69 y.o. male presenting today for follow up of left distal radius fracture.  He has been attending occupational therapy, he has noticed some improvement in function.  He has also noticed moderate to severe pain in the ulnar aspect of the left wrist, more so over the dorsal aspect of the wrist and hand.  He notices that he has pain in the hand, small finger, and ring finger with typing and playing the piano.  He has limited ability in playing guitar or bass.    There was a history of trauma, he tripped on a raised door stop/ledge while on vacation in [place].  Trauma occurred approximately 1/29/19.  He did see a physician in [facility].  Ultrasound was performed and a fracture was noted, he was provided with a wrist brace and a IM injection of Voltaren and a steroid.  He was transitioned to a forearm cast and then a forearm brace.  He wears the brace for driving and public.  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         Review of patient's allergies indicates:   Allergen Reactions    Alphagan [brimonidine]      Patient taking MASO-B Selective Inhibitor Selegiline (Emsam)    Coumadin [warfarin]      itch    Oxycodone      hiccups         Current Outpatient Medications   Medication Sig Dispense Refill    clonazePAM (KLONOPIN) 0.5 MG tablet take 2 tablets by mouth every night at bedtime 60 tablet 5    ergocalciferol (ERGOCALCIFEROL) 50,000 unit Cap Take 1 capsule (50,000 Units total) by mouth every 7 days. for 6 doses 6 capsule 0    HYDROcodone-acetaminophen (NORCO) 5-325 mg per tablet Take 1 tablet by mouth every 8 (eight) hours as needed for Pain (moderate to severe). 21 tablet 0    lamoTRIgine (LAMICTAL) 200 MG tablet Take one tablet by mouth once  daily 90 tablet 3    lithium (ESKALITH) 300 MG capsule Take 150 mg by mouth 2 (two) times daily.      lithium (LITHOTAB) 300 mg tablet Take 1 tablet by mouth at bedtime 30 tablet 11    lithium carbonate 150 MG capsule Take 1 capsule (150 mg total) by mouth 2 (two) times daily. 60 capsule 5    pravastatin (PRAVACHOL) 40 MG tablet Take 1 tablet (40 mg total) by mouth once daily. 90 tablet 3    RABEprazole (ACIPHEX) 20 mg tablet Take 1 tablet (20 mg total) by mouth 2 (two) times daily. 180 tablet 3    selegiline (EMSAM) 12 mg/24 hr Place 1 patch onto the skin once daily. 90 patch 3    selegiline (EMSAM) 12 mg/24 hr Place onto the skin once daily. 90 patch 3    senna-docusate 8.6-50 mg (PERICOLACE) 8.6-50 mg per tablet Take 2 tablets by mouth nightly as needed for Constipation.      sucralfate (CARAFATE) 1 gram tablet as needed.       traZODone (DESYREL) 100 MG tablet Take one tablet by mouth every night at bedtime 90 tablet 3    VOLTAREN 1 % Gel       diclofenac sodium (PENNSAID) 2 % SoPk Apply 2 Pump topically 2 (two) times daily. Apply 2 pumps to affected area twice a day as needed 112 g 2     No current facility-administered medications for this visit.        Past Medical History:   Diagnosis Date    Adenomatous polyp 2003    Adenomatous polyp     Allergy     Arthritis     Back pain     after trauma beginning in 195    Cataract     Chronic pain     neck and left shoulder    Cluster headache 2013    Colon polyp     Degenerative disc disease     Depression     Diverticulitis 12/2013    Elevated PSA     Fibromyalgia 2013    GERD (gastroesophageal reflux disease)     Hepatitis 1970's    A    History of prostate biopsy 2002    Hyperlipidemia     Joint pain     Sleep apnea     Special screening for malignant neoplasms, colon 5/5/2014    Thyroid nodule 7/16/2014    Visual disturbance 2012    problems after cataract surgery       Past Surgical History:   Procedure Laterality Date    BACK  "SURGERY      BLEPHAROPLASTY UPPER LID Bilateral 2/10/2015    Performed by Rhett Lainez III, MD at [facility] 2ND FLR    BLOCK, GANGLION IMPAR N/A 6/25/2013    [facility]    CATARACT EXTRACTION W/  INTRAOCULAR LENS IMPLANT Bilateral     CHOLECYSTECTOMY      COLONOSCOPY N/A 5/5/2014    Performed by Pedro Carlos MD at [facility] (4TH FLR)    COSMETIC SURGERY  2/10/2015    Direct mid-forehead brow lift    COSMETIC SURGERY  2/10/2015    Bilateral upper lid blepahroplasty    ESOPHAGOGASTRODUODENOSCOPY (EGD) N/A 4/28/2015    Performed by Amadou Hardin MD at [facility] (4TH FLR)    EYE SURGERY      FUSION EXTREME LATERAL N/A 6/22/2015    Performed by Milad Browne MD at [facility] 2ND FLR    HEMORRHOID SURGERY      with complication of chronic bleeding for 6 weeks     INJECTION, STEROID Right SI Joint Block and Steroid Injection Right 12/6/2018    [facility]dwell [facility] at [facility]    JOINT REPLACEMENT      KNEE SURGERY      involving arthroscopic surgery to both knees    LIFT-BROW midforehead direct Bilateral 2/10/2015    Performed by Rhett Lainez III, MD at [facility] 2ND FLR    SINUS SURGERY      SINUS SURGERY      left molar and sinus surgery for trigeminal neuralgia    SPINAL FUSION  6/22/2015    L3-L5 XLIF    SPINE SURGERY  6/22/15    XLIF/TANA    TOTAL HIP ARTHROPLASTY  4/2012    Pt states he had total hip replacement on his left hip.         Review of Systems:  Review of Systems   Constitution: Negative for chills and fever.   Skin: Negative for rash and suspicious lesions.   Musculoskeletal:        See HPI   Neurological: Negative for dizziness, headaches, light-headedness, numbness and paresthesias.   Psychiatric/Behavioral: Negative for depression. The patient is not nervous/anxious.          OBJECTIVE:     PHYSICAL EXAM:  Height: 5' 8" (172.7 cm) Weight: 78.9 kg (174 lb)  Vitals:    04/17/19 1036   BP: 112/72   Pulse: (!) 51   Weight: " "78.9 kg (174 lb)   Height: 5' 8" (1.727 m)   PainSc:   4     General    Vitals reviewed.  Constitutional: He is oriented to person, place, and time. He appears well-developed and well-nourished.   HENT:   Head: Normocephalic and atraumatic.   Neck: Normal range of motion.   Cardiovascular: Normal rate.    Pulmonary/Chest: Effort normal. No respiratory distress.   Neurological: He is alert and oriented to person, place, and time.   Psychiatric: He has a normal mood and affect. His behavior is normal. Judgment and thought content normal.             Musculoskeletal: Mild edema noted over the left wrist, volar. No residual ecchymosis at the left wrist. No lacerations or abrasions.  Nontender palpation over the left anatomical snuffbox, moderately tender at the left radiocarpal joint, moderately tender at the left ulnar styloid.  Good finger range of motion left hand.  Fair-good wrist ROM, he does report pain with wrist ROM. No laxity, but mild pain with shuck test. Neurovascularly intact-good sensation and motor function, good capillary refill, 2+ radial pulses.      RADIOGRAPHS:  Left Wrist X-Ray, 4/17/19  FINDINGS:  Comminuted partially impacted left distal radial metaphyseal fracture again identified    Overall fracture line somewhat less apparent suggestive for developing callus and partial healing.  There is no evidence for new fracture or dislocation.  Alignment relatively stable.  Clinical correlation and follow-up advised      Impression     Please see above     Comments: I have personally reviewed the imaging and I agree with the above radiologist's report.    ASSESSMENT/PLAN:   Diagnoses and all orders for this visit:    Closed fracture of distal end of left radius, unspecified fracture morphology, initial encounter  -     X-Ray Wrist Complete Left; Future    Orthopedic aftercare    Other orders  -     diclofenac sodium (PENNSAID) 2 % SoPk; Apply 2 Pump topically 2 (two) times daily. Apply 2 pumps to affected " area twice a day as needed           - We talked at length about the anatomy and pathophysiology of   Encounter Diagnoses   Name Primary?    Closed fracture of distal end of left radius, unspecified fracture morphology, initial encounter Yes    Orthopedic aftercare        - x-ray imaging reviewed with the patient.   - left forearm brace, wean out  - Follow up in 4 weeks with X-Ray  - continue OT, gradual strengthening  - Pennsaid sent to pharmacy      Disclaimer: This note has been generated using voice-recognition software. There may be typographical errors that have been missed during proof-reading.

## 2019-04-18 ENCOUNTER — CLINICAL SUPPORT (OUTPATIENT)
Dept: REHABILITATION | Facility: HOSPITAL | Age: 69
End: 2019-04-18
Attending: NEUROLOGICAL SURGERY
Payer: COMMERCIAL

## 2019-04-18 DIAGNOSIS — M25.532 PAIN IN LEFT WRIST: ICD-10-CM

## 2019-04-18 PROCEDURE — 97110 THERAPEUTIC EXERCISES: CPT

## 2019-04-18 PROCEDURE — 97022 WHIRLPOOL THERAPY: CPT

## 2019-04-18 NOTE — PROGRESS NOTES
Occupational Therapy Daily Treatment Note     Date: 4/18/2019  Name: Raffy Rutherford Jr.  Clinic Number: [id number]    Therapy Diagnosis:   Encounter Diagnosis   Name Primary?    Pain in left wrist      Physician: Cici Carvajal, *    Physician Orders: Distal radius fx, non-Op treatment  Eval and Treat, modalities as needed  1-2 times/week for 6+ weeks  Medical Diagnosis:  S52.502A (ICD-10-CM Closed fracture of distal end of left radius, unspecified fracture morphology, initial encounter  Surgical Procedure and Date: N/A  Evaluation Date: 3/19/2019  Insurance: [facility] [employer]  Insurance Authorization period Expiration: 12/31/2019  Plan of Care Certification Period: 3/19/2019 to 5/19/2019     Visit # / Visits Authortized: 9 / 10  Time In: 11:30 AM  Time Out: 12:30 PM  Time: 60 minutes  Charges: 3 TE, fluido     Precautions: Standard      Subjective     Pt reports: he was compliant with home exercise program given last session. He states that he has trouble playing his instrument without pain.   Response to previous treatment: Pt is playing his instrument more.   Functional change: Decreased pain with movement    Pain: 2/10  Location: left wrist      Objective   Edema. Measured in centimeters.    3/19/2019 3/19/2019 3/29/2019     Left Right Left   Proximal Wrist Crease 18 16.4 18   Mid palm 19.9 20.9 19.9   MCPs 20.5 21.4 20.5      Hand ROM: WNL     Sensation: Intact     Wrist ROM  Date 3/19/2019 3/19/2019 3/29/2018     Left Right Left   Supination/Pronation 80/90 85/90 80/90   Wrist ext/flex 40/20 70/75 50//25   Wrist RD/UD 20/20 35/50 25/20       Strength (Dyanmometer) and Pinch Strength (Pinch Gauge)  Measured in pounds and psi. Average of three trials.    3/19/2019 3/19/2019 3/29/2019 3/29/2019     Left Right Left Right   Rung II def def 43 85        Raffy received the following supervised modalities after being cleared for contradictions for 15 minutes:   -Patient received  fluidotherapy to left hand/wrist for 15 minutes to increase blood flow, circulation, desensitization, sensory re-education and for pain management.      Raffy received the following manual therapy techniques for 5 minutes:   -STM to left wrist     Raffy received therapeutic exercises for 40 minutes including:  -AROM as follows: composite fist, thumb opposition, wrist ext/flex, sup/pron and RD/UD x 10 reps  -wrist wheel 3 ' each wrist flex/ext and sup/pron  -dextersizer x 3'  -hand gripper with 3 yellow bands x 30 reps  -yellow theraflex bar, frowns/smiles and wrist ext/flex x 30 reps   -1 lb wrist 3 ways 1 min each   -yellow putty: 2' molding, 30 grasps    Home Exercises and Education Provided     Education provided:   - None today  - Progress towards goals: Good     Written Home Exercises Provided: Patient instructed to cont prior HEP.  Exercises were reviewed and Raffy was able to demonstrate them prior to the end of the session.  Raffy demonstrated good  understanding of the education provided.     See EMR under Patient Instructions for exercises provided prior visit.     Raffy demonstrated good  understanding of the education provided.         Assessment   Raffy Rutherford Jr. is a 68 y.o. male referred to outpatient occupational/hand therapy and presents with a medical diagnosis of left distal radius fracture, resulting in pain, decreased range of motion and decreased functional use of left hand/wrist. Continued with exercises without difficulty.    Pt would continue to benefit from skilled OT.      Raffy is progressing well towards his goals and there are no updates to goals at this time. Pt prognosis is Excellent.     Pt will continue to benefit from skilled outpatient occupational therapy to address the deficits listed in the problem list on initial evaluation provide pt/family education and to maximize pt's level of independence in the home and community environment.     Anticipated barriers to  occupational therapy: none    Pt's spiritual, cultural and educational needs considered and pt agreeable to plan of care and goals.    Goals:  Short Term (4 weeks on 4/19/2019):  1)   Patient to be IND with HEP and modalities for pain management. Met 3/21/2019  2)   Increase ROM 15 to 20 degrees for wrist ext/flex  to increase functional hand use for playing his instrument.-progressing  3)   Measure  in 2-3 weeks.-progressing  4)   Decrease edema .2-.3 cm to increase joint mobility /flexibility for improved overall functional hand use.-progressing      Long Term (by discharge):  1)   Pt will report 2 out of 10 pain with playing his instrument.-progressing  2)   Patient to score at 40% or less on FOTO to demonstrate improved perception of functional left hand/wrist use.-progressing  3)   Pt will return to prior level of function for ADLs and household management.-progressing         Plan: Cont with OT   Certification Period/Plan of care expiration: 3/19/2019 to 5/19/2019.     Outpatient Occupational Therapy 2 times weekly for 8 weeks may include the following interventions: Paraffin, Fluidotherapy, Manual therapy/joint mobilizations, Modalities for pain management, Therapeutic exercises/activities., Strengthening and Edema Control.       Discussed Plan of Care with patient: Yes  Updates/Grading for next session: Advance as tolerated      Mary Shi, OT

## 2019-04-29 ENCOUNTER — CLINICAL SUPPORT (OUTPATIENT)
Dept: REHABILITATION | Facility: HOSPITAL | Age: 69
End: 2019-04-29
Attending: NEUROLOGICAL SURGERY
Payer: COMMERCIAL

## 2019-04-29 DIAGNOSIS — M25.532 PAIN IN LEFT WRIST: ICD-10-CM

## 2019-04-29 DIAGNOSIS — M54.16 LUMBAR BACK PAIN WITH RADICULOPATHY AFFECTING LOWER EXTREMITY: Primary | ICD-10-CM

## 2019-04-29 PROCEDURE — 97110 THERAPEUTIC EXERCISES: CPT

## 2019-04-29 PROCEDURE — 97022 WHIRLPOOL THERAPY: CPT

## 2019-04-29 NOTE — PROGRESS NOTES
Occupational Therapy Daily Treatment Note     Date: 4/29/2019  Name: Raffy Rutherford Jr.  Clinic Number: [id number]    Therapy Diagnosis:   Encounter Diagnoses   Name Primary?    Pain in left wrist     Lumbar back pain with radiculopathy affecting lower extremity Yes     Physician: Cici Carvajal, *    Physician Orders: Distal radius fx, non-Op treatment  Eval and Treat, modalities as needed  1-2 times/week for 6+ weeks  Medical Diagnosis:  S52.502A (ICD-10-CM Closed fracture of distal end of left radius, unspecified fracture morphology, initial encounter  Surgical Procedure and Date: N/A  Evaluation Date: 3/19/2019  Insurance: [facility] [employer]  Insurance Authorization period Expiration: 12/31/2019  Plan of Care Certification Period: 3/19/2019 to 5/19/2019     Visit # / Visits Authortized: 10/ 10  Time In: 3:00 PM  Time Out: 4:00 PM  Time: 60 minutes  Charges: 3 TE, fluido     Precautions: Standard      Subjective     Pt reports: he was compliant with home exercise program given last session. He states that he has trouble playing his instrument without pain.   Response to previous treatment: Pt is playing his instrument more.   Functional change: Decreased pain with movement    Pain: 2/10  Location: left wrist      Objective   Edema. Measured in centimeters.    3/19/2019 3/19/2019 3/29/2019     Left Right Left   Proximal Wrist Crease 18 16.4 18   Mid palm 19.9 20.9 19.9   MCPs 20.5 21.4 20.5      Hand ROM: WNL     Sensation: Intact     Wrist ROM  Date 3/19/2019 3/19/2019 3/29/2018     Left Right Left   Supination/Pronation 80/90 85/90 80/90   Wrist ext/flex 40/20 70/75 50//25   Wrist RD/UD 20/20 35/50 25/20       Strength (Dyanmometer) and Pinch Strength (Pinch Gauge)  Measured in pounds and psi. Average of three trials.    3/19/2019 3/19/2019 3/29/2019 3/29/2019     Left Right Left Right   Rung II def def 43 85        Raffy received the following supervised modalities after  being cleared for contradictions for 15 minutes:   -Patient received fluidotherapy to left hand/wrist for 15 minutes to increase blood flow, circulation, desensitization, sensory re-education and for pain management.      Raffy received the following manual therapy techniques for 5 minutes:   -STM to left wrist     Raffy received therapeutic exercises for 40 minutes including:  -AROM as follows: composite fist, thumb opposition, wrist ext/flex, sup/pron and RD/UD x 10 reps  -wrist wheel 3 ' each wrist flex/ext and sup/pron  -dextersizer x 3'  -hand gripper with 3 yellow bands x 30 reps  -yellow theraflex bar, frowns/smiles and wrist ext/flex x 30 reps   -1 lb wrist 3 ways 1 min each   -yellow putty: 2' molding, 30 grasps    Home Exercises and Education Provided     Education provided:   - None today  - Progress towards goals: Good     Written Home Exercises Provided: Patient instructed to cont prior HEP.  Exercises were reviewed and Raffy was able to demonstrate them prior to the end of the session.  Raffy demonstrated good  understanding of the education provided.     See EMR under Patient Instructions for exercises provided prior visit.     Raffy demonstrated good  understanding of the education provided.         Assessment   Raffy Rutherford Jr. is a 68 y.o. male referred to outpatient occupational/hand therapy and presents with a medical diagnosis of left distal radius fracture, resulting in pain, decreased range of motion and decreased functional use of left hand/wrist. Continued with exercises without difficulty.    Pt would continue to benefit from skilled OT.      Raffy is progressing well towards his goals and there are no updates to goals at this time. Pt prognosis is Excellent.     Pt will continue to benefit from skilled outpatient occupational therapy to address the deficits listed in the problem list on initial evaluation provide pt/family education and to maximize pt's level of independence in the  home and community environment.     Anticipated barriers to occupational therapy: none    Pt's spiritual, cultural and educational needs considered and pt agreeable to plan of care and goals.    Goals:  Short Term (4 weeks on 4/19/2019):  1)   Patient to be IND with HEP and modalities for pain management. Met 3/21/2019  2)   Increase ROM 15 to 20 degrees for wrist ext/flex  to increase functional hand use for playing his instrument.-progressing  3)   Measure  in 2-3 weeks.-progressing  4)   Decrease edema .2-.3 cm to increase joint mobility /flexibility for improved overall functional hand use.-progressing      Long Term (by discharge):  1)   Pt will report 2 out of 10 pain with playing his instrument.-progressing  2)   Patient to score at 40% or less on FOTO to demonstrate improved perception of functional left hand/wrist use.-progressing  3)   Pt will return to prior level of function for ADLs and household management.-progressing         Plan: Re-assess next session   Certification Period/Plan of care expiration: 3/19/2019 to 5/19/2019.     Outpatient Occupational Therapy 2 times weekly for 8 weeks may include the following interventions: Paraffin, Fluidotherapy, Manual therapy/joint mobilizations, Modalities for pain management, Therapeutic exercises/activities., Strengthening and Edema Control.       Discussed Plan of Care with patient: Yes  Updates/Grading for next session: Advance as tolerated      Mary Shi, OT

## 2019-04-30 ENCOUNTER — PATIENT MESSAGE (OUTPATIENT)
Dept: OTOLARYNGOLOGY | Facility: CLINIC | Age: 69
End: 2019-04-30

## 2019-04-30 ENCOUNTER — OFFICE VISIT (OUTPATIENT)
Dept: OTOLARYNGOLOGY | Facility: CLINIC | Age: 69
End: 2019-04-30
Payer: COMMERCIAL

## 2019-04-30 ENCOUNTER — CLINICAL SUPPORT (OUTPATIENT)
Dept: AUDIOLOGY | Facility: CLINIC | Age: 69
End: 2019-04-30
Payer: COMMERCIAL

## 2019-04-30 VITALS — SYSTOLIC BLOOD PRESSURE: 119 MMHG | DIASTOLIC BLOOD PRESSURE: 69 MMHG | HEART RATE: 60 BPM

## 2019-04-30 DIAGNOSIS — H61.23 BILATERAL IMPACTED CERUMEN: ICD-10-CM

## 2019-04-30 DIAGNOSIS — H90.3 HEARING LOSS, SENSORINEURAL, HIGH FREQUENCY, BILATERAL: Primary | ICD-10-CM

## 2019-04-30 DIAGNOSIS — H93.11 TINNITUS, RIGHT: Primary | ICD-10-CM

## 2019-04-30 DIAGNOSIS — H90.3 SENSORINEURAL HEARING LOSS, BILATERAL: ICD-10-CM

## 2019-04-30 DIAGNOSIS — H90.3 HEARING LOSS, SENSORINEURAL, HIGH FREQUENCY, BILATERAL: ICD-10-CM

## 2019-04-30 DIAGNOSIS — H92.03 EAR PAIN, BILATERAL: Primary | ICD-10-CM

## 2019-04-30 DIAGNOSIS — H93.11 TINNITUS, RIGHT EAR: ICD-10-CM

## 2019-04-30 PROCEDURE — 69210 PR REMOVAL IMPACTED CERUMEN REQUIRING INSTRUMENTATION, UNILATERAL: ICD-10-PCS | Mod: S$GLB,,, | Performed by: OTOLARYNGOLOGY

## 2019-04-30 PROCEDURE — 92557 PR COMPREHENSIVE HEARING TEST: ICD-10-PCS | Mod: S$GLB,,, | Performed by: AUDIOLOGIST

## 2019-04-30 PROCEDURE — 1100F PTFALLS ASSESS-DOCD GE2>/YR: CPT | Mod: CPTII,S$GLB,, | Performed by: OTOLARYNGOLOGY

## 2019-04-30 PROCEDURE — 99212 OFFICE O/P EST SF 10 MIN: CPT | Mod: 25,S$GLB,, | Performed by: OTOLARYNGOLOGY

## 2019-04-30 PROCEDURE — 3288F PR FALLS RISK ASSESSMENT DOCUMENTED: ICD-10-PCS | Mod: CPTII,S$GLB,, | Performed by: OTOLARYNGOLOGY

## 2019-04-30 PROCEDURE — 92567 TYMPANOMETRY: CPT | Mod: S$GLB,,, | Performed by: AUDIOLOGIST

## 2019-04-30 PROCEDURE — 92567 PR TYMPA2METRY: ICD-10-PCS | Mod: S$GLB,,, | Performed by: AUDIOLOGIST

## 2019-04-30 PROCEDURE — 99212 PR OFFICE/OUTPT VISIT, EST, LEVL II, 10-19 MIN: ICD-10-PCS | Mod: 25,S$GLB,, | Performed by: OTOLARYNGOLOGY

## 2019-04-30 PROCEDURE — 92557 COMPREHENSIVE HEARING TEST: CPT | Mod: S$GLB,,, | Performed by: AUDIOLOGIST

## 2019-04-30 PROCEDURE — 99999 PR PBB SHADOW E&M-EST. PATIENT-LVL III: CPT | Mod: PBBFAC,,, | Performed by: OTOLARYNGOLOGY

## 2019-04-30 PROCEDURE — 1100F PR PT FALLS ASSESS DOC 2+ FALLS/FALL W/INJURY/YR: ICD-10-PCS | Mod: CPTII,S$GLB,, | Performed by: OTOLARYNGOLOGY

## 2019-04-30 PROCEDURE — 3288F FALL RISK ASSESSMENT DOCD: CPT | Mod: CPTII,S$GLB,, | Performed by: OTOLARYNGOLOGY

## 2019-04-30 PROCEDURE — 99999 PR PBB SHADOW E&M-EST. PATIENT-LVL III: ICD-10-PCS | Mod: PBBFAC,,, | Performed by: OTOLARYNGOLOGY

## 2019-04-30 PROCEDURE — 69210 REMOVE IMPACTED EAR WAX UNI: CPT | Mod: S$GLB,,, | Performed by: OTOLARYNGOLOGY

## 2019-04-30 NOTE — PROGRESS NOTES
CC:  Ear pressure rule out cerumen impactions  HPI:Mr. 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  He gained a semi urgent appointment with me today for evaluation of his ears for ear pressure sx.  He had been previously evaluated for hearing loss.  He began the hearing aid fitting process but he decided not to obtain the devices.    He completed an audiometric study today, the  results of which are duplicated below and compared to a previous study.      PE:  Blood pressure 119/69 pulse 60 height 5 ft 8 in weight 174 lb  General:  Alert and oriented gentleman in no acute distress  Both ears were examined under the microscope in the micro procedure room  Procedures:  Cerumen impactions carefully extracted from each ear canal with a blunt curette.  Both eardrums are intact and clear as visualized directly.      DIAGNOSIS:     ICD-10-CM ICD-9-CM    1. Ear pain, bilateral H92.03 388.70 Ambulatory consult to Audiology   2. Tinnitus, right ear H93.11 388.30    3. Bilateral impacted cerumen H61.23 380.4    4. Hearing loss, sensorineural, high frequency, bilateral H91.93 389.8      PLAN: Cerumen impactions removed from both eacs  Audiometry/tympanometry  reviewed; results compared to previous 2018 study

## 2019-04-30 NOTE — PATIENT INSTRUCTIONS
Cerumen impactions removed from both eacs  Audiometry/tympanometry  reviewed; results compared to previous 2018 study

## 2019-05-08 ENCOUNTER — CLINICAL SUPPORT (OUTPATIENT)
Dept: REHABILITATION | Facility: HOSPITAL | Age: 69
End: 2019-05-08
Attending: NEUROLOGICAL SURGERY
Payer: COMMERCIAL

## 2019-05-08 DIAGNOSIS — M25.532 PAIN IN LEFT WRIST: ICD-10-CM

## 2019-05-08 PROCEDURE — 97110 THERAPEUTIC EXERCISES: CPT

## 2019-05-08 PROCEDURE — 97022 WHIRLPOOL THERAPY: CPT

## 2019-05-08 NOTE — PROGRESS NOTES
Occupational Therapy Re-evaluation Note     Date: 5/8/2019  Name: Raffy Rutherford Jr.  Clinic Number: [id number]    Therapy Diagnosis:   Encounter Diagnosis   Name Primary?    Pain in left wrist      Physician: Dr. Addi Bauer    Physician Orders: Distal radius fx, non-Op treatment  Eval and Treat, modalities as needed  1-2 times/week for 6+ weeks  Medical Diagnosis:  S52.502A (ICD-10-CM Closed fracture of distal end of left radius, unspecified fracture morphology, initial encounter  Surgical Procedure and Date: N/A  Evaluation Date: 3/19/2019  Insurance: [facility] [employer]  Insurance Authorization period Expiration: 12/31/2019  Plan of Care Certification Period: 5/08/2019 to 7/08/2019     Visit # / Visits Authortized: 1/8  Time In: 2:00 PM  Time Out: 3:00 PM  Time: 60 minutes  Charges: 3 TE, fluido     Precautions: Standard      Subjective     Pt reports: he was compliant with home exercise program given last session. He states that he has trouble playing his instrument without pain.   Response to previous treatment: Pt is playing his instrument more.   Functional change: Decreased pain with movement    Pain: 5-6/10  Location: left wrist      Objective   Edema. Measured in centimeters.    3/19/2019 3/19/2019 3/29/2019 5/8/2019     Left Right Left Left   Proximal Wrist Crease 18 16.4 18 17.5   Mid palm 19.9 20.9 19.9 20.5   MCPs 20.5 21.4 20.5 20.5      Hand ROM: WNL     Sensation: Intact     Wrist ROM  Date 3/19/2019 3/19/2019 3/29/2018 5/8/2019     Left Right Left Left   Supination/Pronation 80/90 85/90 80/90 80/90   Wrist ext/flex 40/20 70/75 50//25 60/35   Wrist RD/UD 20/20 35/50 25/20 25/20       Strength (Dyanmometer) and Pinch Strength (Pinch Gauge)  Measured in pounds and psi. Average of three trials.    3/19/2019 3/19/2019 3/29/2019 3/29/2019 5/8/2019     Left Right Left Right Left   Rung II def def 43 85 64        Raffy received the following supervised modalities after being  cleared for contradictions for 15 minutes:   -Patient received fluidotherapy to left hand/wrist for 15 minutes to increase blood flow, circulation, desensitization, sensory re-education and for pain management.      Raffy received the following manual therapy techniques for 5 minutes:   -STM to left wrist  -Kinesiotape to left wrist with 80% correction over ulnar side of wrist     Raffy received therapeutic exercises for 40 minutes including:  -AROM as follows: composite fist, thumb opposition, wrist ext/flex, sup/pron and RD/UD x 10 reps  -wrist wheel 3 ' each wrist flex/ext and sup/pron  -dextersizer x 3'  -hand gripper with 3 yellow bands x 30 reps  -yellow theraflex bar, frowns/smiles and wrist ext/flex x 30 reps   -1 lb wrist 3 ways 1 min each   -yellow putty: 2' molding, 30 grasps    Home Exercises and Education Provided     Education provided:   - None today  - Progress towards goals: Good     Written Home Exercises Provided: Patient instructed to cont prior HEP.  Exercises were reviewed and Raffy was able to demonstrate them prior to the end of the session.  Raffy demonstrated good  understanding of the education provided.     See EMR under Patient Instructions for exercises provided prior visit.     Raffy demonstrated good  understanding of the education provided.         Assessment   Raffy Rutherford Jr. is a 68 y.o. male referred to outpatient occupational/hand therapy and presents with a medical diagnosis of left distal radius fracture, resulting in pain, decreased range of motion and decreased functional use of left hand/wrist. Continued with exercises without difficulty. Pt made moderate gains with AROM and  strength of left hand/wrist. Edema has slightly decreased. Pain has increased with increased function.    Pt would continue to benefit from skilled OT.      Raffy is progressing well towards his goals and there are no updates to goals at this time. Pt prognosis is Excellent.     Pt will  continue to benefit from skilled outpatient occupational therapy to address the deficits listed in the problem list on initial evaluation provide pt/family education and to maximize pt's level of independence in the home and community environment.     Anticipated barriers to occupational therapy: none    Pt's spiritual, cultural and educational needs considered and pt agreeable to plan of care and goals.    Goals:  Short Term (4 weeks on 4/19/2019):  1)   Patient to be IND with HEP and modalities for pain management. Met 3/21/2019  2)   Increase ROM 15 to 20 degrees for wrist ext/flex  to increase functional hand use for playing his instrument.-Met 5/8/2019  3)   Measure  in 2-3 weeks.-Met 5/8/2019  4)   Decrease edema .2-.3 cm to increase joint mobility /flexibility for improved overall functional hand use.-Met 5/8/2019     Long Term (by discharge):  1)   Pt will report 2 out of 10 pain with playing his instrument.-progressing  2)   Patient to score at 40% or less on FOTO to demonstrate improved perception of functional left hand/wrist use.-progressing  3)   Pt will return to prior level of function for ADLs and household management.-progressing         Plan: Cont with OT   Certification Period/Plan of care expiration: 5/08/2019 to 7/08/2019.     Outpatient Occupational Therapy 2 times weekly for 8 weeks may include the following interventions: Paraffin, Fluidotherapy, Manual therapy/joint mobilizations, Modalities for pain management, Therapeutic exercises/activities., Strengthening and Edema Control.       Discussed Plan of Care with patient: Yes  Updates/Grading for next session: Advance as tolerated      Mary Shi, OT

## 2019-05-13 ENCOUNTER — PATIENT MESSAGE (OUTPATIENT)
Dept: ORTHOPEDICS | Facility: CLINIC | Age: 69
End: 2019-05-13

## 2019-05-14 ENCOUNTER — TELEPHONE (OUTPATIENT)
Dept: ORTHOPEDICS | Facility: CLINIC | Age: 69
End: 2019-05-14

## 2019-05-15 ENCOUNTER — OFFICE VISIT (OUTPATIENT)
Dept: ORTHOPEDICS | Facility: CLINIC | Age: 69
End: 2019-05-15
Payer: COMMERCIAL

## 2019-05-15 ENCOUNTER — HOSPITAL ENCOUNTER (OUTPATIENT)
Dept: RADIOLOGY | Facility: OTHER | Age: 69
Discharge: HOME | End: 2019-05-15
Attending: PHYSICIAN ASSISTANT
Payer: COMMERCIAL

## 2019-05-15 VITALS
HEART RATE: 65 BPM | BODY MASS INDEX: 26.37 KG/M2 | DIASTOLIC BLOOD PRESSURE: 76 MMHG | WEIGHT: 174 LBS | SYSTOLIC BLOOD PRESSURE: 128 MMHG | HEIGHT: 68 IN

## 2019-05-15 DIAGNOSIS — M25.532 PAIN IN LEFT WRIST: ICD-10-CM

## 2019-05-15 DIAGNOSIS — M72.0 DUPUYTREN'S CONTRACTURE OF BOTH HANDS: ICD-10-CM

## 2019-05-15 DIAGNOSIS — S52.502A CLOSED FRACTURE OF DISTAL END OF LEFT RADIUS, UNSPECIFIED FRACTURE MORPHOLOGY, INITIAL ENCOUNTER: Primary | ICD-10-CM

## 2019-05-15 DIAGNOSIS — S52.502A CLOSED FRACTURE OF DISTAL END OF LEFT RADIUS, UNSPECIFIED FRACTURE MORPHOLOGY, INITIAL ENCOUNTER: ICD-10-CM

## 2019-05-15 PROCEDURE — 73110 X-RAY EXAM OF WRIST: CPT | Mod: TC,FY,LT

## 2019-05-15 PROCEDURE — 1101F PT FALLS ASSESS-DOCD LE1/YR: CPT | Mod: CPTII,S$GLB,, | Performed by: PHYSICIAN ASSISTANT

## 2019-05-15 PROCEDURE — 73110 XR WRIST COMPLETE 3 VIEWS LEFT: ICD-10-PCS | Mod: 26,LT,, | Performed by: RADIOLOGY

## 2019-05-15 PROCEDURE — 73110 X-RAY EXAM OF WRIST: CPT | Mod: 26,LT,, | Performed by: RADIOLOGY

## 2019-05-15 PROCEDURE — 99999 PR PBB SHADOW E&M-EST. PATIENT-LVL IV: ICD-10-PCS | Mod: PBBFAC,,, | Performed by: PHYSICIAN ASSISTANT

## 2019-05-15 PROCEDURE — 1101F PR PT FALLS ASSESS DOC 0-1 FALLS W/OUT INJ PAST YR: ICD-10-PCS | Mod: CPTII,S$GLB,, | Performed by: PHYSICIAN ASSISTANT

## 2019-05-15 PROCEDURE — 99999 PR PBB SHADOW E&M-EST. PATIENT-LVL IV: CPT | Mod: PBBFAC,,, | Performed by: PHYSICIAN ASSISTANT

## 2019-05-15 PROCEDURE — 99214 OFFICE O/P EST MOD 30 MIN: CPT | Mod: S$GLB,,, | Performed by: PHYSICIAN ASSISTANT

## 2019-05-15 PROCEDURE — 99214 PR OFFICE/OUTPT VISIT, EST, LEVL IV, 30-39 MIN: ICD-10-PCS | Mod: S$GLB,,, | Performed by: PHYSICIAN ASSISTANT

## 2019-05-15 NOTE — PROGRESS NOTES
Subjective:      Patient ID: Raffy Rutherford Jr. is a 69 y.o. male.    Chief Complaint: Pain of the Left Wrist      HPI  Raffy Rutherford Jr. is a right hand dominant 69 y.o. male presenting today for follow up of left distal radius fracture.  He has been attending occupational therapy, he reports that sometimes the pain is worse after therapy.  He continues to have intermittent pain in the ulnar aspect of the left wrist, more so over the dorsal aspect of the wrist and hand.  He has noticed some rotation of the left small finger, causing difficulty with playing instruments.  He has limited ability in playing guitar or bass.    There was a history of trauma, he tripped on a raised door stop/ledge while on vacation in [place].  Trauma occurred approximately 1/29/19.  He did see a physician in [facility].  Ultrasound was performed and a fracture was noted, he was provided with a wrist brace and a IM injection of Voltaren and a steroid.  He was transitioned to a forearm cast and then a forearm brace.  He wears the brace for driving and public.  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         Review of patient's allergies indicates:   Allergen Reactions    Alphagan [brimonidine]      Patient taking MASO-B Selective Inhibitor Selegiline (Emsam)    Coumadin [warfarin]      itch    Oxycodone      hiccups         Current Outpatient Medications   Medication Sig Dispense Refill    clonazePAM (KLONOPIN) 0.5 MG tablet take 2 tablets by mouth every night at bedtime 60 tablet 5    diclofenac sodium (PENNSAID) 2 % SoPk Apply 2 Pump topically 2 (two) times daily. Apply 2 pumps to affected area twice a day as needed 112 g 2    HYDROcodone-acetaminophen (NORCO) 5-325 mg per tablet Take 1 tablet by mouth every 8 (eight) hours as needed for Pain (moderate to severe). 21 tablet 0    lamoTRIgine (LAMICTAL) 200 MG tablet Take one tablet by  mouth once daily 90 tablet 3    lithium (ESKALITH) 300 MG capsule Take 150 mg by mouth 2 (two) times daily.      lithium (LITHOTAB) 300 mg tablet Take 1 tablet by mouth at bedtime 30 tablet 11    lithium carbonate 150 MG capsule Take 1 capsule (150 mg total) by mouth 2 (two) times daily. 60 capsule 5    pravastatin (PRAVACHOL) 40 MG tablet Take 1 tablet (40 mg total) by mouth once daily. 90 tablet 3    RABEprazole (ACIPHEX) 20 mg tablet Take 1 tablet (20 mg total) by mouth 2 (two) times daily. 180 tablet 3    selegiline (EMSAM) 12 mg/24 hr Place 1 patch onto the skin once daily. 90 patch 3    selegiline (EMSAM) 12 mg/24 hr Place onto the skin once daily. 90 patch 3    senna-docusate 8.6-50 mg (PERICOLACE) 8.6-50 mg per tablet Take 2 tablets by mouth nightly as needed for Constipation.      sucralfate (CARAFATE) 1 gram tablet as needed.       traZODone (DESYREL) 100 MG tablet Take one tablet by mouth every night at bedtime 90 tablet 3    VOLTAREN 1 % Gel        No current facility-administered medications for this visit.        Past Medical History:   Diagnosis Date    Adenomatous polyp 2003    Adenomatous polyp     Allergy     Arthritis     Back pain     after trauma beginning in 195    Cataract     Chronic pain     neck and left shoulder    Cluster headache 2013    Colon polyp     Degenerative disc disease     Depression     Diverticulitis 12/2013    Elevated PSA     Fibromyalgia 2013    GERD (gastroesophageal reflux disease)     Hepatitis 1970's    A    History of prostate biopsy 2002    Hyperlipidemia     Joint pain     Sleep apnea     Special screening for malignant neoplasms, colon 5/5/2014    Thyroid nodule 7/16/2014    Visual disturbance 2012    problems after cataract surgery       Past Surgical History:   Procedure Laterality Date    BACK SURGERY      BLEPHAROPLASTY UPPER LID Bilateral 2/10/2015    Performed by Leesportrafaela Lainez III[facility] at [facility] OR 2ND FLR    BLOCK,  "GANGLION IMPAR N/A 6/25/2013    Per[facility] Araceli Evans [facility]    CATARACT EXTRACTION W/  INTRAOCULAR LENS IMPLANT Bilateral     CHOLECYSTECTOMY      COLONOSCOPY N/A 5/5/2014    Performed by Pedro Carlos [facility] at [facility] ENDO (4TH FLR)    COSMETIC SURGERY  2/10/2015    Direct mid-forehead brow lift    COSMETIC SURGERY  2/10/2015    Bilateral upper lid blepahroplasty    ESOPHAGOGASTRODUODENOSCOPY (EGD) N/A 4/28/2015    Performed by Amadou Hardin MD at [facility] ENDO (4TH FLR)    EYE SURGERY      FUSION EXTREME LATERAL N/A 6/22/2015    Performed by Milad Browne MD at [facility] OR 2ND FLR    HEMORRHOID SURGERY      with complication of chronic bleeding for 6 weeks     INJECTION, STEROID Right SI Joint Block and Steroid Injection Right 12/6/2018    [facility] at [facility] OR    JOINT REPLACEMENT      KNEE SURGERY      involving arthroscopic surgery to both knees    LIFT-BROW midforehead direct Bilateral 2/10/2015    Performed by Rhett Lainez III, MD at [facility] OR 2ND FLR    SINUS SURGERY      SINUS SURGERY      left molar and sinus surgery for trigeminal neuralgia    SPINAL FUSION  6/22/2015    L3-L5 XLIF    SPINE SURGERY  6/22/15    XLIF/TANA    TOTAL HIP ARTHROPLASTY  4/2012    Pt states he had total hip replacement on his left hip.         Review of Systems:  Review of Systems   Constitution: Negative for chills and fever.   Skin: Negative for rash and suspicious lesions.   Musculoskeletal:        See HPI   Neurological: Negative for dizziness, headaches, light-headedness, numbness and paresthesias.   Psychiatric/Behavioral: Negative for depression. The patient is not nervous/anxious.          OBJECTIVE:     PHYSICAL EXAM:  Height: 5' 8" (172.7 cm) Weight: 78.9 kg (174 lb)  Vitals:    05/15/19 1313   BP: 128/76   Pulse: 65   Weight: 78.9 kg (174 lb)   Height: 5' 8" (1.727 m)   PainSc:   5     General    Vitals reviewed.  Constitutional: He is oriented to person, " place, and time. He appears well-developed and well-nourished.   HENT:   Head: Normocephalic and atraumatic.   Neck: Normal range of motion.   Cardiovascular: Normal rate.    Pulmonary/Chest: Effort normal. No respiratory distress.   Neurological: He is alert and oriented to person, place, and time.   Psychiatric: He has a normal mood and affect. His behavior is normal. Judgment and thought content normal.             Musculoskeletal: Mild edema noted over the volar left wrist, mildly improved.   No lacerations or abrasions.  Skin tethering/nodularity is noted in the palms bilaterally, left more than right- possible Dupuytren's nodules. Nontender palpation over the left anatomical snuffbox, moderately tender at the left radiocarpal joint, moderately tender at the left ulnar styloid.  Good finger range of motion left hand, slight radial deviation of the small finger.  Fair-good wrist ROM, he does report pain with wrist ROM. No significant laxity, but mild pain with shuck test. Neurovascularly intact-good sensation and motor function, good capillary refill, 2+ radial pulses.      RADIOGRAPHS:  Left Wrist X-Ray, 5/15/19  FINDINGS:  Prior minimally displaced fracture of the distal radial metaphysis.  Fracture line less conspicuous on today's study with fracture fragments in stable position and alignment.    The remainder of the osseous structures are intact.  No new fracture or dislocation.  No advanced degenerative change of the wrist.      Impression       Healing fracture of the distal radial metaphysis.       Comments: I have personally reviewed the imaging and I agree with the above radiologist's report.    ASSESSMENT/PLAN:   Raffy was seen today for pain.    Diagnoses and all orders for this visit:    Closed fracture of distal end of left radius, unspecified fracture morphology, initial encounter  -     MRI Wrist Joint Without Contrast Left; Future    Pain in left wrist  -     MRI Wrist Joint Without Contrast  Left; Future    Dupuytren's contracture of both hands           - We talked at length about the anatomy and pathophysiology of   Encounter Diagnoses   Name Primary?    Closed fracture of distal end of left radius, unspecified fracture morphology, initial encounter Yes    Pain in left wrist     Dupuytren's contracture of both hands        - x-ray imaging reviewed with the patient.   - MRI to evaluate persistent left wrist pain, possible soft tissue injury from trauma  - Follow up with Dr. Bauer after MRI, per patient request  - Discussed potential options for skin tethering in the palm/Dupuytren's  - hold OT until after MRI, HEP as tolerated        Disclaimer: This note has been generated using voice-recognition software. There may be typographical errors that have been missed during proof-reading.

## 2019-05-17 ENCOUNTER — HOSPITAL ENCOUNTER (OUTPATIENT)
Dept: RADIOLOGY | Facility: HOSPITAL | Age: 69
Discharge: HOME | End: 2019-05-17
Attending: PHYSICIAN ASSISTANT
Payer: COMMERCIAL

## 2019-05-17 DIAGNOSIS — M25.532 PAIN IN LEFT WRIST: ICD-10-CM

## 2019-05-17 DIAGNOSIS — S52.502A CLOSED FRACTURE OF DISTAL END OF LEFT RADIUS, UNSPECIFIED FRACTURE MORPHOLOGY, INITIAL ENCOUNTER: ICD-10-CM

## 2019-05-17 PROCEDURE — 73221 MRI JOINT UPR EXTREM W/O DYE: CPT | Mod: TC,LT

## 2019-05-17 PROCEDURE — 73221 MRI WRIST WITHOUT CONTRAST LEFT: ICD-10-PCS | Mod: 26,LT,, | Performed by: RADIOLOGY

## 2019-05-17 PROCEDURE — 73221 MRI JOINT UPR EXTREM W/O DYE: CPT | Mod: 26,LT,, | Performed by: RADIOLOGY

## 2019-05-20 ENCOUNTER — TELEPHONE (OUTPATIENT)
Dept: ORTHOPEDICS | Facility: CLINIC | Age: 69
End: 2019-05-20

## 2019-05-20 ENCOUNTER — PATIENT MESSAGE (OUTPATIENT)
Dept: ORTHOPEDICS | Facility: CLINIC | Age: 69
End: 2019-05-20

## 2019-05-20 ENCOUNTER — PATIENT MESSAGE (OUTPATIENT)
Dept: ENDOSCOPY | Facility: HOSPITAL | Age: 69
End: 2019-05-20

## 2019-05-24 ENCOUNTER — PATIENT MESSAGE (OUTPATIENT)
Dept: INTERNAL MEDICINE | Facility: CLINIC | Age: 69
End: 2019-05-24

## 2019-05-24 ENCOUNTER — OFFICE VISIT (OUTPATIENT)
Dept: ORTHOPEDICS | Facility: CLINIC | Age: 69
End: 2019-05-24
Payer: COMMERCIAL

## 2019-05-24 VITALS
HEART RATE: 66 BPM | DIASTOLIC BLOOD PRESSURE: 71 MMHG | BODY MASS INDEX: 26.36 KG/M2 | HEIGHT: 68 IN | SYSTOLIC BLOOD PRESSURE: 109 MMHG | WEIGHT: 173.94 LBS

## 2019-05-24 DIAGNOSIS — Z12.11 COLON CANCER SCREENING: Primary | ICD-10-CM

## 2019-05-24 DIAGNOSIS — S52.502A CLOSED FRACTURE OF DISTAL END OF LEFT RADIUS, UNSPECIFIED FRACTURE MORPHOLOGY, INITIAL ENCOUNTER: Primary | ICD-10-CM

## 2019-05-24 DIAGNOSIS — K21.9 GASTROESOPHAGEAL REFLUX DISEASE WITHOUT ESOPHAGITIS: ICD-10-CM

## 2019-05-24 PROCEDURE — 99999 PR PBB SHADOW E&M-EST. PATIENT-LVL III: ICD-10-PCS | Mod: PBBFAC,,, | Performed by: ORTHOPAEDIC SURGERY

## 2019-05-24 PROCEDURE — 1101F PR PT FALLS ASSESS DOC 0-1 FALLS W/OUT INJ PAST YR: ICD-10-PCS | Mod: CPTII,S$GLB,, | Performed by: ORTHOPAEDIC SURGERY

## 2019-05-24 PROCEDURE — 99213 PR OFFICE/OUTPT VISIT, EST, LEVL III, 20-29 MIN: ICD-10-PCS | Mod: S$GLB,,, | Performed by: ORTHOPAEDIC SURGERY

## 2019-05-24 PROCEDURE — 99213 OFFICE O/P EST LOW 20 MIN: CPT | Mod: S$GLB,,, | Performed by: ORTHOPAEDIC SURGERY

## 2019-05-24 PROCEDURE — 99999 PR PBB SHADOW E&M-EST. PATIENT-LVL III: CPT | Mod: PBBFAC,,, | Performed by: ORTHOPAEDIC SURGERY

## 2019-05-24 PROCEDURE — 1101F PT FALLS ASSESS-DOCD LE1/YR: CPT | Mod: CPTII,S$GLB,, | Performed by: ORTHOPAEDIC SURGERY

## 2019-05-24 RX ORDER — BUTALBITAL, ACETAMINOPHEN AND CAFFEINE 50; 325; 40 MG/1; MG/1; MG/1
1 TABLET ORAL EVERY 4 HOURS PRN
Qty: 60 TABLET | Refills: 0 | Status: SHIPPED | OUTPATIENT
Start: 2019-05-24 | End: 2019-06-23

## 2019-05-24 RX ORDER — DICLOFENAC SODIUM 10 MG/G
2 GEL TOPICAL DAILY
Qty: 100 G | Refills: 0 | Status: SHIPPED | OUTPATIENT
Start: 2019-05-24 | End: 2021-12-07

## 2019-05-24 NOTE — PROGRESS NOTES
[facility]  History & Physical  Orthopedics    SUBJECTIVE:      Chief Complaint:  Left wrist pain    Referring Provider: No ref. provider found     History of Present Illness:  Patient is a 69 y.o. right hand dominant male who presents today with complaints of left wrist pain. He reports that at the end of January, 2018 he was vacationing in [facility] when he sustained a low energy fall onto his left wrist. He was found to have an intra-articular minimally displaced distal radius fracture upon follow-up in the [place] and was treated by closed means for his fracture. He was treated with initial casting and then a removable brace which was discontinued approximately 3 weeks ago per his report.  He has been attending occupational therapy for range of motion and exercises.  He notes that he has plateaued in his recovery and feels as if he is not making much progress and is still experiencing some pain which he rates 4 to 6/10.  Of note he does have a history of Dupuytren's disease and he reports that his left small finger seems to be rotating in abnormally.  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  He reports that his pain is global but does identify an area at the volar aspect of the wrist and the ulnar aspect of the wrist which are painful.  He presents for evaluation at the request of 1 of our physician's assistants..     The patient is a/an musician, psychotherapist, .    Onset of symptoms/DOI was nearly 4 months ago.    Symptoms are aggravated by activity and movement.    Symptoms are alleviated by rest.    Symptoms consist of pain.    The patient rates their pain as a 4/10.    Attempted treatment(s) and/or interventions include rest, activity modification, anti-inflammatory medications, physical and/or occupational therapy, immobilization and splinting/casting.     The patient denies any fevers, chills, N/V, D/C and  presents for evaluation.       Past Medical History:   Diagnosis Date    Adenomatous polyp 2003    Adenomatous polyp     Allergy     Arthritis     Back pain     after trauma beginning in 195    Cataract     Chronic pain     neck and left shoulder    Cluster headache 2013    Colon polyp     Degenerative disc disease     Depression     Diverticulitis 12/2013    Elevated PSA     Fibromyalgia 2013    GERD (gastroesophageal reflux disease)     Hepatitis 1970's    A    History of prostate biopsy 2002    Hyperlipidemia     Joint pain     Sleep apnea     Special screening for malignant neoplasms, colon 5/5/2014    Thyroid nodule 7/16/2014    Visual disturbance 2012    problems after cataract surgery     Past Surgical History:   Procedure Laterality Date    BACK SURGERY      BLEPHAROPLASTY UPPER LID Bilateral 2/10/2015    Performed by Rhett Lainez III, MD at [facility] OR 2ND FLR    BLOCK, GANGLION IMPAR N/A 6/25/2013    [facility]aceli [facility] at [facility] PAIN MGT    CATARACT EXTRACTION W/  INTRAOCULAR LENS IMPLANT Bilateral     CHOLECYSTECTOMY      COLONOSCOPY N/A 5/5/2014    Performed by Pedro Carlos MD at [facility] ENDO (4TH FLR)    COSMETIC SURGERY  2/10/2015    Direct mid-forehead brow lift    COSMETIC SURGERY  2/10/2015    Bilateral upper lid blepahroplasty    ESOPHAGOGASTRODUODENOSCOPY (EGD) N/A 4/28/2015    Performed by Amadou Hardin MD at [facility] ENDO (4TH FLR)    EYE SURGERY      FUSION EXTREME LATERAL N/A 6/22/2015    Performed by Milad Browne MD at [facility] OR 2ND FLR    HEMORRHOID SURGERY      with complication of chronic bleeding for 6 weeks     INJECTION, STEROID Right SI Joint Block and Steroid Injection Right 12/6/2018    Performed by [facility] Caldwell [facility] at [facility] OR    JOINT REPLACEMENT      KNEE SURGERY      involving arthroscopic surgery to both knees    LIFT-BROW midforehead direct Bilateral 2/10/2015    Performed by Rhett Lainez III, MD at [facility] OR 2ND FLR     SINUS SURGERY      SINUS SURGERY      left molar and sinus surgery for trigeminal neuralgia    SPINAL FUSION  6/22/2015    L3-L5 XLIF    SPINE SURGERY  6/22/15    XLIF/TANA    TOTAL HIP ARTHROPLASTY  4/2012    Pt states he had total hip replacement on his left hip.     Review of patient's allergies indicates:   Allergen Reactions    Alphagan [brimonidine]      Patient taking MASO-B Selective Inhibitor Selegiline (Emsam)    Coumadin [warfarin]      itch    Oxycodone      hiccups    Pennsaid [diclofenac sodium]      Social History     Social History Narrative    Not on file     Family History   Problem Relation Age of Onset    Cancer Mother         colon; uterine    Nephrolithiasis Mother     Stroke Mother 86    Pancreatitis Brother     Vision loss Brother         macular degeneration    Diabetes Brother     Macular degeneration Brother     Migraines Sister     No Known Problems Father     No Known Problems Maternal Grandmother     No Known Problems Maternal Grandfather     No Known Problems Paternal Grandmother     No Known Problems Paternal Grandfather     No Known Problems Sister     No Known Problems Maternal Aunt     No Known Problems Maternal Uncle     No Known Problems Paternal Aunt     No Known Problems Paternal Uncle     Melanoma Neg Hx     Amblyopia Neg Hx     Blindness Neg Hx     Cataracts Neg Hx     Glaucoma Neg Hx     Hypertension Neg Hx     Retinal detachment Neg Hx     Strabismus Neg Hx     Thyroid disease Neg Hx     Allergic rhinitis Neg Hx     Allergies Neg Hx     Angioedema Neg Hx     Asthma Neg Hx     Eczema Neg Hx     Urticaria Neg Hx     Rhinitis Neg Hx     Immunodeficiency Neg Hx     Atopy Neg Hx          Current Outpatient Medications:     clonazePAM (KLONOPIN) 0.5 MG tablet, take 2 tablets by mouth every night at bedtime, Disp: 60 tablet, Rfl: 5    HYDROcodone-acetaminophen (NORCO) 5-325 mg per tablet, Take 1 tablet by mouth every 8 (eight)  "hours as needed for Pain (moderate to severe)., Disp: 21 tablet, Rfl: 0    lamoTRIgine (LAMICTAL) 200 MG tablet, Take one tablet by mouth once daily, Disp: 90 tablet, Rfl: 3    lithium (ESKALITH) 300 MG capsule, Take 150 mg by mouth 2 (two) times daily., Disp: , Rfl:     lithium (LITHOTAB) 300 mg tablet, Take 1 tablet by mouth at bedtime, Disp: 30 tablet, Rfl: 11    lithium carbonate 150 MG capsule, Take 1 capsule (150 mg total) by mouth 2 (two) times daily., Disp: 60 capsule, Rfl: 5    pravastatin (PRAVACHOL) 40 MG tablet, Take 1 tablet (40 mg total) by mouth once daily., Disp: 90 tablet, Rfl: 3    RABEprazole (ACIPHEX) 20 mg tablet, Take 1 tablet (20 mg total) by mouth 2 (two) times daily., Disp: 180 tablet, Rfl: 3    selegiline (EMSAM) 12 mg/24 hr, Place 1 patch onto the skin once daily., Disp: 90 patch, Rfl: 3    selegiline (EMSAM) 12 mg/24 hr, Place onto the skin once daily., Disp: 90 patch, Rfl: 3    senna-docusate 8.6-50 mg (PERICOLACE) 8.6-50 mg per tablet, Take 2 tablets by mouth nightly as needed for Constipation., Disp: , Rfl:     sucralfate (CARAFATE) 1 gram tablet, as needed. , Disp: , Rfl:     traZODone (DESYREL) 100 MG tablet, Take one tablet by mouth every night at bedtime, Disp: 90 tablet, Rfl: 3    VOLTAREN 1 % Gel, , Disp: , Rfl:     diclofenac sodium (VOLTAREN) 1 % Gel, Apply 2 g topically once daily., Disp: 100 g, Rfl: 0      Review of Systems:  Constitutional: no fever or chills  Eyes: no visual changes  ENT: no nasal congestion or sore throat  Respiratory: no cough or shortness of breath  Cardiovascular: no chest pain  Gastrointestinal: no nausea or vomiting, tolerating diet  Musculoskeletal: pain, soreness and decreased ROM    OBJECTIVE:      Vital Signs (Most Recent):  Vitals:    05/24/19 0831   BP: 109/71   BP Location: Right arm   Patient Position: Sitting   BP Method: Medium (Automatic)   Pulse: 66   Weight: 78.9 kg (173 lb 15.1 oz)   Height: 5' 8" (1.727 m)     Body mass " index is 26.45 kg/m².      Physical Exam:  Constitutional: The patient appears well-developed and well-nourished. No distress.   Head: Normocephalic and atraumatic.   Nose: Nose normal.   Eyes: Conjunctivae and EOM are normal.   Neck: No tracheal deviation present.   Cardiovascular: Normal rate and intact distal pulses.    Pulmonary/Chest: Effort normal. No respiratory distress.   Abdominal: There is no guarding.   Neurological: The patient is alert.   Psychiatric: The patient has a normal mood and affect.     Left Hand/Wrist Examination:    Observation/Inspection:  Swelling  none    Deformity  none  Discoloration  none     Scars   none    Atrophy  None  There are some Dupuytren's cords throughout the patient's left hand to the small long and ring fingers.  There is no significant contracture of the MCP or PIP joints associated with this.    HAND/WRIST EXAMINATION:  Finkelstein's Test   Neg  WHAT Test    Neg  Snuff box tenderness   Neg  Ochoa's Test    Neg  Hook of Hamate Tenderness  Neg  CMC grind    Neg  Circumduction test   Neg    Neurovascular Exam:  Digits WWP, brisk CR < 3s throughout  NVI motor/LTS to M/R/U nerves, radial pulse 2+  Tinel's Test - Carpal Tunnel  positive  Tinel's Test - Cubital Tunnel  Neg      ROM hand/elbow full, painless  Range of motion left wrist is to approximately 35° extension, 45° flexion, pronation to 70°, supination to 50°.    Diagnostic Results:     Xray -  healing intra-articular left distal radius fracture in overall good alignment with no evidence of complication  MRI left wrist-Partially comminuted, nondisplaced distal radial fracture with intra-articular extension and minimal step-off.    Focal tear of the TFCC near the radial attachment.    Sprain/partial tear of the scapholunate ligament central component.  Dorsal and volar bands appear intact.    Tendinosis of the ECU.    ASSESSMENT/PLAN:      69 y.o. yo male with healing left distal radius fracture and additional MRI  findings per above  Plan:  At this point in time I believe the patient's recovery is progressing as expected after his significant wrist injury.  He does not have any static instability of his scapholunate interval on imaging with no instability on exam.  I discussed with him that he may resume activities and weight-bearing as tolerated at this time and attempt to work his way back into his usual activities.  He is to continue occupational therapy at this time unless t he feels as if it is making his symptoms worse.  I prior I provided him reassurance that I believe his symptoms will improve slowly with additional time and patience.  We could always consider doing a steroid injection in the future although given the incomplete healing as noted on his recent MRI I would defer this today. I would like to re-evaluate him in 6 weeks for further evaluation.        Addi Bauer M.D.     Please be aware that this note has been generated with the assistance of [employer] voice-to-text.  Please excuse any spelling or grammatical errors.

## 2019-05-25 ENCOUNTER — TELEPHONE (OUTPATIENT)
Dept: ENDOSCOPY | Facility: HOSPITAL | Age: 69
End: 2019-05-25

## 2019-05-27 ENCOUNTER — CLINICAL SUPPORT (OUTPATIENT)
Dept: REHABILITATION | Facility: HOSPITAL | Age: 69
End: 2019-05-27
Attending: NEUROLOGICAL SURGERY
Payer: COMMERCIAL

## 2019-05-27 DIAGNOSIS — M25.532 PAIN IN LEFT WRIST: Primary | ICD-10-CM

## 2019-05-27 PROCEDURE — 97022 WHIRLPOOL THERAPY: CPT

## 2019-05-27 PROCEDURE — 97110 THERAPEUTIC EXERCISES: CPT

## 2019-05-27 PROCEDURE — 97530 THERAPEUTIC ACTIVITIES: CPT

## 2019-05-27 NOTE — PROGRESS NOTES
Occupational Therapy Daily Treatment Note     Date: 5/27/2019  Name: Raffy Rutherford Jr.  Clinic Number: [id number]    Therapy Diagnosis:   Encounter Diagnosis   Name Primary?    Pain in left wrist Yes     Physician: Dr Addi Bauer    Physician Orders: Distal radius fx, non-Op treatment  Eval and Treat, modalities as needed  1-2 times/week for 6+ weeks  Medical Diagnosis:  S52.502A (ICD-10-CM Closed fracture of distal end of left radius, unspecified fracture morphology, initial encounter    Imaging: MRI 5/17/2019   Impression       Partially comminuted, nondisplaced distal radial fracture with intra-articular extension and minimal step-off.    Focal tear of the TFCC near the radial attachment.    Sprain/partial tear of the scapholunate ligament central component.  Dorsal and volar bands appear intact.    Tendinosis of the ECU.       Surgical Procedure and Date: N/A  Evaluation Date: 3/19/2019  Insurance: [facility] Blue Cross  Insurance Authorization period Expiration: 12/31/2019  Plan of Care Certification Period: 5/8/2019 to 7/8/2019     Visit # / Visits Authortized: 2/8  Time In: 3:30 PM  Time Out: 4:30 PM  Time: 60 minutes  Charges: 1 TE, 2 TA,  fluido     Precautions: Standard      Subjective     Pt reports: he was compliant with home exercise program given last session. He states that he has trouble playing his instrument without pain. Pt reports that his physician stated that he should get better over time.   Response to previous treatment: Pt is playing his instrument more.   Functional change: none noted    Pain: 5-6/10  Location: left wrist      Objective   Edema. Measured in centimeters.    3/19/2019 3/19/2019 3/29/2019 5/8/2019     Left Right Left Left   Proximal Wrist Crease 18 16.4 18 17.5   Mid palm 19.9 20.9 19.9 20.5   MCPs 20.5 21.4 20.5 20.5      Hand ROM: WNL     Sensation: Intact     Wrist ROM  Date 3/19/2019 3/19/2019 3/29/2018 5/8/2019     Left Right Left Left    Supination/Pronation 80/90 85/90 80/90 80/90   Wrist ext/flex 40/20 70/75 50//25 60/35   Wrist RD/UD 20/20 35/50 25/20 25/20       Strength (Dyanmometer) and Pinch Strength (Pinch Gauge)  Measured in pounds and psi. Average of three trials.    3/19/2019 3/19/2019 3/29/2019 3/29/2019 5/8/2019     Left Right Left Right Left   Rung II def def 43 85 64        Raffy received the following supervised modalities after being cleared for contradictions for 15 minutes:   -Patient received fluidotherapy to left hand/wrist for 15 minutes to increase blood flow, circulation, desensitization, sensory re-education and for pain management.      Raffy received the following manual therapy techniques for 5 minutes:   -STM to left wrist  -Kinesiotape to left wrist with 80% correction over ulnar side of wrist     Raffy received therapeutic exercises for 10 minutes including:  -AROM as follows: TGEs, finger abd/add, wrist ext/flex, sup/pron and RD/UD x 10 reps    Raffy received therapeutic activities for 30 minutes including:  -wrist wheel 3 ' each wrist flex/ext and sup/pron   -dextersizer x 3'  -hand gripper with 3 yellow bands x 30 reps for grasping  -red theraflex bar, frowns/smiles and wrist ext/flex x 30 reps for gripping  -1 lb wrist 3 ways 1 min each for lifting activities  -yellow putty: 2' molding, 30 grasps, 1 min ring/small finger adduction for gripping activities    Home Exercises and Education Provided     Education provided:   - Yellow putty: small/ring finger adduction x 1 min  - Progress towards goals: Good     Written Home Exercises Provided: Patient instructed to cont prior HEP.  Exercises were reviewed and Raffy was able to demonstrate them prior to the end of the session.  Raffy demonstrated good  understanding of the education provided.     See EMR under Patient Instructions for exercises provided prior visit.    Raffy demonstrated good  understanding of the education provided.         Assessment    Raffy Rutherford Jr. is a 68 y.o. male referred to outpatient occupational/hand therapy and presents with a medical diagnosis of left distal radius fracture, resulting in pain, decreased range of motion and decreased functional use of left hand/wrist. Advanced strengthening exercises without difficulty.     Pt would continue to benefit from skilled OT.      Raffy is progressing well towards his goals and there are no updates to goals at this time. Pt prognosis is Excellent.     Pt will continue to benefit from skilled outpatient occupational therapy to address the deficits listed in the problem list on initial evaluation provide pt/family education and to maximize pt's level of independence in the home and community environment.     Anticipated barriers to occupational therapy: none    Pt's spiritual, cultural and educational needs considered and pt agreeable to plan of care and goals.    Goals:  Short Term (4 weeks on 4/19/2019):  1)   Patient to be IND with HEP and modalities for pain management. Met 3/21/2019  2)   Increase ROM 15 to 20 degrees for wrist ext/flex  to increase functional hand use for playing his instrument.-Met 5/8/2019  3)   Measure  in 2-3 weeks.-Met 5/8/2019  4)   Decrease edema .2-.3 cm to increase joint mobility /flexibility for improved overall functional hand use.-Met 5/8/2019     Long Term (by discharge):  1)   Pt will report 2 out of 10 pain with playing his instrument.-progressing  2)   Patient to score at 40% or less on FOTO to demonstrate improved perception of functional left hand/wrist use.-progressing  3)   Pt will return to prior level of function for ADLs and household management.-progressing         Plan: Cont with OT   Certification Period/Plan of care expiration: 5/08/2019 to 7/08/2019.     Outpatient Occupational Therapy 2 times weekly for 8 weeks may include the following interventions: Paraffin, Fluidotherapy, Manual therapy/joint mobilizations, Modalities for pain  management, Therapeutic exercises/activities., Strengthening and Edema Control.       Discussed Plan of Care with patient: Yes  Updates/Grading for next session: Advance as tolerated      Mary Shi, OT

## 2019-05-27 NOTE — PLAN OF CARE
Occupational Therapy Re-evaluation Note     Date: 5/8/2019  Name: Raffy Rutherford Jr.  Clinic Number: [id number]    Therapy Diagnosis:   Encounter Diagnosis   Name Primary?    Pain in left wrist      Physician: Dr. Addi Bauer    Physician Orders: Distal radius fx, non-Op treatment  Eval and Treat, modalities as needed  1-2 times/week for 6+ weeks  Medical Diagnosis:  S52.502A (ICD-10-CM Closed fracture of distal end of left radius, unspecified fracture morphology, initial encounter  Surgical Procedure and Date: N/A  Evaluation Date: 3/19/2019  Insurance: [facility] Blue [employer]  Insurance Authorization period Expiration: 12/31/2019  Plan of Care Certification Period: 5/08/2019 to 7/08/2019     Visit # / Visits Authortized: 1/8  Time In: 2:00 PM  Time Out: 3:00 PM  Time: 60 minutes  Charges: 3 TE, fluido     Precautions: Standard      Subjective     Pt reports: he was compliant with home exercise program given last session. He states that he has trouble playing his instrument without pain.   Response to previous treatment: Pt is playing his instrument more.   Functional change: Decreased pain with movement    Pain: 5-6/10  Location: left wrist      Objective   Edema. Measured in centimeters.    3/19/2019 3/19/2019 3/29/2019 5/8/2019     Left Right Left Left   Proximal Wrist Crease 18 16.4 18 17.5   Mid palm 19.9 20.9 19.9 20.5   MCPs 20.5 21.4 20.5 20.5      Hand ROM: WNL     Sensation: Intact     Wrist ROM  Date 3/19/2019 3/19/2019 3/29/2018 5/8/2019     Left Right Left Left   Supination/Pronation 80/90 85/90 80/90 80/90   Wrist ext/flex 40/20 70/75 50//25 60/35   Wrist RD/UD 20/20 35/50 25/20 25/20       Strength (Dyanmometer) and Pinch Strength (Pinch Gauge)  Measured in pounds and psi. Average of three trials.    3/19/2019 3/19/2019 3/29/2019 3/29/2019 5/8/2019     Left Right Left Right Left   Rung II def def 43 85 64        Raffy received the following supervised modalities after being  cleared for contradictions for 15 minutes:   -Patient received fluidotherapy to left hand/wrist for 15 minutes to increase blood flow, circulation, desensitization, sensory re-education and for pain management.      Raffy received the following manual therapy techniques for 5 minutes:   -STM to left wrist  -Kinesiotape to left wrist with 80% correction over ulnar side of wrist     Raffy received therapeutic exercises for 40 minutes including:  -AROM as follows: composite fist, thumb opposition, wrist ext/flex, sup/pron and RD/UD x 10 reps  -wrist wheel 3 ' each wrist flex/ext and sup/pron  -dextersizer x 3'  -hand gripper with 3 yellow bands x 30 reps  -yellow theraflex bar, frowns/smiles and wrist ext/flex x 30 reps   -1 lb wrist 3 ways 1 min each   -yellow putty: 2' molding, 30 grasps    Home Exercises and Education Provided     Education provided:   - None today  - Progress towards goals: Good     Written Home Exercises Provided: Patient instructed to cont prior HEP.  Exercises were reviewed and Raffy was able to demonstrate them prior to the end of the session.  Raffy demonstrated good  understanding of the education provided.     See EMR under Patient Instructions for exercises provided prior visit.     Raffy demonstrated good  understanding of the education provided.         Assessment   Raffy Rutherford Jr. is a 68 y.o. male referred to outpatient occupational/hand therapy and presents with a medical diagnosis of left distal radius fracture, resulting in pain, decreased range of motion and decreased functional use of left hand/wrist. Continued with exercises without difficulty. Pt made moderate gains with AROM and  strength of left hand/wrist. Edema has slightly decreased. Pain has increased with increased function.    Pt would continue to benefit from skilled OT.      Raffy is progressing well towards his goals and there are no updates to goals at this time. Pt prognosis is Excellent.     Pt will  continue to benefit from skilled outpatient occupational therapy to address the deficits listed in the problem list on initial evaluation provide pt/family education and to maximize pt's level of independence in the home and community environment.     Anticipated barriers to occupational therapy: none    Pt's spiritual, cultural and educational needs considered and pt agreeable to plan of care and goals.    Goals:  Short Term (4 weeks on 4/19/2019):  1)   Patient to be IND with HEP and modalities for pain management. Met 3/21/2019  2)   Increase ROM 15 to 20 degrees for wrist ext/flex  to increase functional hand use for playing his instrument.-Met 5/8/2019  3)   Measure  in 2-3 weeks.-Met 5/8/2019  4)   Decrease edema .2-.3 cm to increase joint mobility /flexibility for improved overall functional hand use.-Met 5/8/2019     Long Term (by discharge):  1)   Pt will report 2 out of 10 pain with playing his instrument.-progressing  2)   Patient to score at 40% or less on FOTO to demonstrate improved perception of functional left hand/wrist use.-progressing  3)   Pt will return to prior level of function for ADLs and household management.-progressing         Plan: Cont with OT   Certification Period/Plan of care expiration: 5/08/2019 to 7/08/2019.     Outpatient Occupational Therapy 2 times weekly for 8 weeks may include the following interventions: Paraffin, Fluidotherapy, Manual therapy/joint mobilizations, Modalities for pain management, Therapeutic exercises/activities., Strengthening and Edema Control.       Discussed Plan of Care with patient: Yes  Updates/Grading for next session: Advance as tolerated      Mary Shi OT     I, _____________________________________, certify the above occupational therapy services are medically necessary for the above certification dates.

## 2019-05-30 ENCOUNTER — CLINICAL SUPPORT (OUTPATIENT)
Dept: REHABILITATION | Facility: HOSPITAL | Age: 69
End: 2019-05-30
Attending: NEUROLOGICAL SURGERY
Payer: COMMERCIAL

## 2019-05-30 DIAGNOSIS — M25.532 PAIN IN LEFT WRIST: Primary | ICD-10-CM

## 2019-05-30 PROCEDURE — 97110 THERAPEUTIC EXERCISES: CPT

## 2019-05-30 PROCEDURE — 97530 THERAPEUTIC ACTIVITIES: CPT

## 2019-05-30 PROCEDURE — 97022 WHIRLPOOL THERAPY: CPT

## 2019-05-30 NOTE — PROGRESS NOTES
Occupational Therapy Daily Treatment Note     Date: 5/30/2019  Name: Raffy Rutherford Jr.  Clinic Number: [id number]    Therapy Diagnosis:   Encounter Diagnosis   Name Primary?    Pain in left wrist Yes     Physician: Dr Addi Bauer    Physician Orders: Distal radius fx, non-Op treatment  Eval and Treat, modalities as needed  1-2 times/week for 6+ weeks  Medical Diagnosis:  S52.502A (ICD-10-CM Closed fracture of distal end of left radius, unspecified fracture morphology, initial encounter    Imaging: MRI 5/17/2019   Impression       Partially comminuted, nondisplaced distal radial fracture with intra-articular extension and minimal step-off.    Focal tear of the TFCC near the radial attachment.    Sprain/partial tear of the scapholunate ligament central component.  Dorsal and volar bands appear intact.    Tendinosis of the ECU.       Surgical Procedure and Date: N/A  Evaluation Date: 3/19/2019  Insurance: [facility] [employer]  Insurance Authorization period Expiration: 12/31/2019  Plan of Care Certification Period: 5/8/2019 to 7/8/2019     Visit # / Visits Authortized: 3/8  Time In: 1:05 PM  Time Out: 2:05 PM  Total Time: 60 minutes  Charges: 1 TE, 2 TA,  fluido     Precautions: Standard      Subjective     Pt reports: he was compliant with home exercise program given last session. He states that he has trouble playing his instrument without pain. Pt reports that his physician stated that he should get better over time.   Response to previous treatment: Pt is playing his instrument more.   Functional change: none noted    Pain: 5-6/10  Location: left wrist      Objective   Edema. Measured in centimeters.    3/19/2019 3/19/2019 3/29/2019 5/8/2019     Left Right Left Left   Proximal Wrist Crease 18 16.4 18 17.5   Mid palm 19.9 20.9 19.9 20.5   MCPs 20.5 21.4 20.5 20.5      Hand ROM: WNL     Sensation: Intact     Wrist ROM  Date 3/19/2019 3/19/2019 3/29/2018 5/8/2019     Left Right Left Left    Supination/Pronation 80/90 85/90 80/90 80/90   Wrist ext/flex 40/20 70/75 50//25 60/35   Wrist RD/UD 20/20 35/50 25/20 25/20       Strength (Dyanmometer) and Pinch Strength (Pinch Gauge)  Measured in pounds and psi. Average of three trials.    3/19/2019 3/19/2019 3/29/2019 3/29/2019 5/8/2019     Left Right Left Right Left   Rung II def def 43 85 64        Raffy received the following supervised modalities after being cleared for contradictions for 15 minutes:   -Patient received fluidotherapy to left hand/wrist for 15 minutes to increase blood flow, circulation, desensitization, sensory re-education and for pain management.      Raffy received the following manual therapy techniques for 5 minutes:   -STM to left wrist  -Kinesiotape to left wrist with 80% correction over ulnar side of wrist     Raffy received therapeutic exercises for 10 minutes including:  -AROM as follows: TGEs, finger abd/add, wrist ext/flex, sup/pron and RD/UD x 10 reps    Raffy received therapeutic activities for 30 minutes including:  -wrist wheel 3 ' each wrist flex/ext and sup/pron   -dextersizer x 3'  -hand gripper with 3 yellow bands x 30 reps for grasping  -red theraflex bar, frowns/smiles and wrist ext/flex x 1 min ea for gripping  -1 lb wrist 3 ways 1 min each for lifting activities  -yellow putty: 2' molding, 30 grasps, 1 min ring/small finger adduction for gripping activities    Home Exercises and Education Provided     Education provided:   - TGEs, finger ext and abd/add  - Progress towards goals: Good     Written Home Exercises Provided: Patient instructed to cont prior HEP and today's program.  Exercises were reviewed and Raffy was able to demonstrate them prior to the end of the session.  Raffy demonstrated good  understanding of the education provided.     See EMR under Patient Instructions for exercises provided prior visit.    Raffy demonstrated good  understanding of the education provided.         Assessment    Raffy Rutherford Jr. is a 68 y.o. male referred to outpatient occupational/hand therapy and presents with a medical diagnosis of left distal radius fracture, resulting in pain, decreased range of motion and decreased functional use of left hand/wrist. Continued strengthening exercises without difficulty. Issued finger ROM exercises to add to his HEP.    Pt would continue to benefit from skilled OT.      Raffy is progressing well towards his goals and there are no updates to goals at this time. Pt prognosis is Excellent.     Pt will continue to benefit from skilled outpatient occupational therapy to address the deficits listed in the problem list on initial evaluation provide pt/family education and to maximize pt's level of independence in the home and community environment.     Anticipated barriers to occupational therapy: none    Pt's spiritual, cultural and educational needs considered and pt agreeable to plan of care and goals.    Goals:  Short Term (4 weeks on 4/19/2019):  1)   Patient to be IND with HEP and modalities for pain management. Met 3/21/2019  2)   Increase ROM 15 to 20 degrees for wrist ext/flex  to increase functional hand use for playing his instrument.-Met 5/8/2019  3)   Measure  in 2-3 weeks.-Met 5/8/2019  4)   Decrease edema .2-.3 cm to increase joint mobility /flexibility for improved overall functional hand use.-Met 5/8/2019     Long Term (by discharge):  1)   Pt will report 2 out of 10 pain with playing his instrument.-progressing  2)   Patient to score at 40% or less on FOTO to demonstrate improved perception of functional left hand/wrist use.-progressing  3)   Pt will return to prior level of function for ADLs and household management.-progressing         Plan: Re-assess next session   Certification Period/Plan of care expiration: 5/08/2019 to 7/08/2019.     Outpatient Occupational Therapy 2 times weekly for 8 weeks may include the following interventions: Paraffin, Fluidotherapy,  Manual therapy/joint mobilizations, Modalities for pain management, Therapeutic exercises/activities., Strengthening and Edema Control.       Discussed Plan of Care with patient: Yes  Updates/Grading for next session: Advance as tolerated      Mary Shi, OT

## 2019-05-30 NOTE — PATIENT INSTRUCTIONS
"[facility] - OCCUPATIONAL THERAPY  HOME EXERCISE PROGRAM       Complete the following exercises with 10 repetitions each, 3 x/day.     AROM: Isolated MCP Flexion / Extension ("Wave")   Bend only your large, bottom knuckles. Hold 3 seconds. Keep the tips of your fingers straight. Straighten fingers.    AROM: Isolated IPJ Flexion / Extension ("Hook")  Bend only your middle and end knuckles. Hold 3 seconds.   Straighten your fingers.     AROM: MCP and PIP Flexion / Extension ("Straight Fist")  Bend your bottom and middle knuckles, keeping the tips of your fingers straight. Try to touch the pads of your fingers on your palm. Hold 3 seconds. Straighten your fingers.     AROM: Composite Flexion / Extension ("Full Fist")  Bend every joint in your hand into a fist. Hold 3 seconds. Straighten your fingers.     AROM: Composte Extension ("Finger Lifts")  Lift your finger off of the table one at a time. Hold 3 seconds. Relax your finger.    AROM: Abduction / Adduction  With hand flat on table, spread all fingers apart, then bring them together as close as possible.    Copyright © I. All rights reserved.     Therapist: RENA Mckenzie CHT    "

## 2019-06-03 ENCOUNTER — CLINICAL SUPPORT (OUTPATIENT)
Dept: REHABILITATION | Facility: HOSPITAL | Age: 69
End: 2019-06-03
Attending: NEUROLOGICAL SURGERY
Payer: COMMERCIAL

## 2019-06-03 DIAGNOSIS — M25.532 PAIN IN LEFT WRIST: ICD-10-CM

## 2019-06-03 PROCEDURE — 97110 THERAPEUTIC EXERCISES: CPT

## 2019-06-03 PROCEDURE — 97530 THERAPEUTIC ACTIVITIES: CPT

## 2019-06-03 PROCEDURE — 97022 WHIRLPOOL THERAPY: CPT

## 2019-06-03 NOTE — PATIENT INSTRUCTIONS
[facility]  OCCUPATIONAL THERAPY  HOME EXERCISE PROGRAM     Complete the following strengthening program 1x/day.          _                    Ring and small only    RENA Mckenzie, EZIO  Certified Hand Therapist  Occupational Therapist

## 2019-06-03 NOTE — PROGRESS NOTES
Occupational Therapy Daily Treatment Note     Date: 6/3/2019  Name: Raffy Rutherford Jr.  Clinic Number: [id number]    Therapy Diagnosis:   Encounter Diagnosis   Name Primary?    Pain in left wrist      Physician: Dr Addi Bauer    Physician Orders: Distal radius fx, non-Op treatment  Eval and Treat, modalities as needed  1-2 times/week for 6+ weeks  Medical Diagnosis:  S52.502A (ICD-10-CM Closed fracture of distal end of left radius, unspecified fracture morphology, initial encounter    Imaging: MRI 5/17/2019   Impression       Partially comminuted, nondisplaced distal radial fracture with intra-articular extension and minimal step-off.    Focal tear of the TFCC near the radial attachment.    Sprain/partial tear of the scapholunate ligament central component.  Dorsal and volar bands appear intact.    Tendinosis of the ECU.       Surgical Procedure and Date: N/A  Evaluation Date: 3/19/2019  Insurance: [facility] [employer]  Insurance Authorization period Expiration: 12/31/2019  Plan of Care Certification Period: 5/8/2019 to 7/8/2019     Visit # / Visits Authortized: 4/8  Time In: 2:00 PM  Time Out: 3:00 PM  Total Time: 60 minutes  Charges: 1 TE, 2 TA,  fluido     Precautions: Standard      Subjective     Pt reports: he was compliant with home exercise program given last session.  Response to previous treatment: Pt is playing his instrument more.   Functional change: none noted    Pain: 4/10  Location: left wrist      Objective   Edema. Measured in centimeters.    3/19/2019 3/19/2019 3/29/2019 5/8/2019 6/3/2019     Left Right Left Left Left   Proximal Wrist Crease 18 16.4 18 17.5 17.5   Mid palm 19.9 20.9 19.9 20.5 20.5   MCPs 20.5 21.4 20.5 20.5 20.5      Hand ROM: WNL     Sensation: Intact     Wrist ROM  Date 3/19/2019 3/19/2019 3/29/2018 5/8/2019 6/3/2019     Left Right Left Left Left   Supination/Pronation 80/90 85/90 80/90 80/90 85/90   Wrist ext/flex 40/20 70/75 50//25 60/35 60/50   Wrist  RD/UD 20/20 35/50 25/20 25/20 30/35       Strength (Dyanmometer) and Pinch Strength (Pinch Gauge)  Measured in pounds and psi. Average of three trials.    3/19/2019 3/19/2019 3/29/2019 3/29/2019 5/8/2019 6/3/2019     Left Right Left Right Left Left   Rung II def def 43 85 64 65        Raffy received the following supervised modalities after being cleared for contradictions for 15 minutes:   -Patient received fluidotherapy to left hand/wrist for 15 minutes to increase blood flow, circulation, desensitization, sensory re-education and for pain management.      Raffy received the following manual therapy techniques for 5 minutes:   -STM to left wrist  -Kinesiotape to left wrist with 80% correction over ulnar side of wrist     Raffy received therapeutic exercises for 10 minutes including:  -AROM as follows: TGEs, finger abd/add, wrist ext/flex, sup/pron and RD/UD x 10 reps    Raffy received therapeutic activities for 30 minutes including:  -wrist wheel 3 ' each wrist flex/ext and sup/pron   -dextersizer x 3'  -hand gripper with 3 yellow bands x 30 reps for grasping  -red theraflex bar, frowns/smiles and wrist ext/flex x 1 min ea for gripping  -1 lb wrist 3 ways 1 min each for lifting activities  -yellow putty: 2' molding, 30 grasps, 1 min ring/small finger adduction for gripping activities    Home Exercises and Education Provided     Education provided:   - small and ring yellow putty add ex  - Progress towards goals: Good     Written Home Exercises Provided: Patient instructed to cont prior HEP and today's program.  Exercises were reviewed and Raffy was able to demonstrate them prior to the end of the session.  Raffy demonstrated good  understanding of the education provided.     See EMR under Patient Instructions for exercises provided prior visit.    Raffy demonstrated good  understanding of the education provided.         Assessment   Raffy Rutherford Jr. is a 68 y.o. male referred to outpatient  occupational/hand therapy and presents with a medical diagnosis of left distal radius fracture, resulting in pain, decreased range of motion and decreased functional use of left hand/wrist. Continued strengthening exercises without difficulty. Pt has made AROM in his left wrist. Minimal edema noted in wrist.  strength improved by 1 lb.    Pt would continue to benefit from skilled OT.      Raffy is progressing well towards his goals and there are no updates to goals at this time. Pt prognosis is Excellent.     Pt will continue to benefit from skilled outpatient occupational therapy to address the deficits listed in the problem list on initial evaluation provide pt/family education and to maximize pt's level of independence in the home and community environment.     Anticipated barriers to occupational therapy: none    Pt's spiritual, cultural and educational needs considered and pt agreeable to plan of care and goals.    Goals:  Short Term (4 weeks on 4/19/2019):  1)   Patient to be IND with HEP and modalities for pain management. Met 3/21/2019  2)   Increase ROM 15 to 20 degrees for wrist ext/flex  to increase functional hand use for playing his instrument.-Met 5/8/2019  3)   Measure  in 2-3 weeks.-Met 5/8/2019  4)   Decrease edema .2-.3 cm to increase joint mobility /flexibility for improved overall functional hand use.-Met 5/8/2019     Long Term (by discharge):  1)   Pt will report 2 out of 10 pain with playing his instrument.-progressing  2)   Patient to score at 40% or less on FOTO to demonstrate improved perception of functional left hand/wrist use.-progressing  3)   Pt will return to prior level of function for ADLs and household management.-progressing         Plan:    Certification Period/Plan of care expiration: 5/08/2019 to 7/08/2019.     Outpatient Occupational Therapy 2 times weekly for 8 weeks may include the following interventions: Paraffin, Fluidotherapy, Manual therapy/joint mobilizations,  Modalities for pain management, Therapeutic exercises/activities., Strengthening and Edema Control.       Discussed Plan of Care with patient: Yes  Updates/Grading for next session: Advance as tolerated      Mary Shi, OT

## 2019-06-13 ENCOUNTER — PATIENT MESSAGE (OUTPATIENT)
Dept: NEUROSURGERY | Facility: CLINIC | Age: 69
End: 2019-06-13

## 2019-06-14 ENCOUNTER — CLINICAL SUPPORT (OUTPATIENT)
Dept: REHABILITATION | Facility: HOSPITAL | Age: 69
End: 2019-06-14
Attending: NEUROLOGICAL SURGERY
Payer: COMMERCIAL

## 2019-06-14 DIAGNOSIS — M25.532 PAIN IN LEFT WRIST: ICD-10-CM

## 2019-06-14 PROCEDURE — 97530 THERAPEUTIC ACTIVITIES: CPT

## 2019-06-14 PROCEDURE — 97110 THERAPEUTIC EXERCISES: CPT

## 2019-06-14 PROCEDURE — 97022 WHIRLPOOL THERAPY: CPT

## 2019-06-14 NOTE — PROGRESS NOTES
Occupational Therapy Daily Treatment Note     Date: 6/14/2019  Name: Raffy Rutherford Jr.  Clinic Number: [id number]    Therapy Diagnosis:   Encounter Diagnosis   Name Primary?    Pain in left wrist      Physician: Dr Addi Bauer    Physician Orders: Distal radius fx, non-Op treatment  Eval and Treat, modalities as needed  1-2 times/week for 6+ weeks  Medical Diagnosis:  S52.502A (ICD-10-CM Closed fracture of distal end of left radius, unspecified fracture morphology, initial encounter    Imaging: MRI 5/17/2019   Impression       Partially comminuted, nondisplaced distal radial fracture with intra-articular extension and minimal step-off.    Focal tear of the TFCC near the radial attachment.    Sprain/partial tear of the scapholunate ligament central component.  Dorsal and volar bands appear intact.    Tendinosis of the ECU.       Surgical Procedure and Date: N/A  Evaluation Date: 3/19/2019  Insurance: [facility] [employer]  Insurance Authorization period Expiration: 12/31/2019  Plan of Care Certification Period: 5/8/2019 to 7/8/2019     Visit # / Visits Authortized: 5/8  Time In: 10:30 AM  Time Out: 11:30 AM  Total Time: 60 minutes  Charges: 1 TE, 1TA,  fluido     Precautions: Standard      Subjective     Pt reports: he was compliant with home exercise program given last session. Pt reports decreased pain overall, however after performing the red putty he complained of increased pain.  Response to previous treatment: Pt is playing his instrument more.   Functional change: none noted    Pain: 2/10  Location: left wrist      Objective   Edema. Measured in centimeters.    3/19/2019 3/19/2019 3/29/2019 5/8/2019 6/3/2019     Left Right Left Left Left   Proximal Wrist Crease 18 16.4 18 17.5 17.5   Mid palm 19.9 20.9 19.9 20.5 20.5   MCPs 20.5 21.4 20.5 20.5 20.5      Hand ROM: WNL     Sensation: Intact     Wrist ROM  Date 3/19/2019 3/19/2019 3/29/2018 5/8/2019 6/3/2019     Left Right Left Left Left    Supination/Pronation 80/90 85/90 80/90 80/90 85/90   Wrist ext/flex 40/20 70/75 50//25 60/35 60/50   Wrist RD/UD 20/20 35/50 25/20 25/20 30/35       Strength (Dyanmometer) and Pinch Strength (Pinch Gauge)  Measured in pounds and psi. Average of three trials.    3/19/2019 3/19/2019 3/29/2019 3/29/2019 5/8/2019 6/3/2019     Left Right Left Right Left Left   Rung II def def 43 85 64 65        Raffy received the following supervised modalities after being cleared for contradictions for 15 minutes:   -Patient received fluidotherapy to left hand/wrist for 15 minutes to increase blood flow, circulation, desensitization, sensory re-education and for pain management.      Raffy received the following manual therapy techniques for 5 minutes:   -STM to left wrist  -Kinesiotape to left wrist with 80% correction over ulnar side of wrist     Raffy received therapeutic exercises for 10 minutes including:  -AROM as follows: TGEs, finger abd/add, wrist ext/flex, sup/pron and RD/UD x 10 reps    Raffy received therapeutic activities for 20 minutes including:  -wrist wheel 3 ' each wrist flex/ext and sup/pron   -dextersizer x 3'  -hand gripper with 3 yellow bands x 30 reps for grasping (NT)  -red theraflex bar, frowns/smiles and wrist ext/flex x 1 min ea for gripping  -1 lb wrist 3 ways 1 min each for lifting activities (NT)  -red putty: 2' molding, 30 grasps, 1 min ring/small finger adduction for gripping activities    Cold pack x 10 min post tx    Home Exercises and Education Provided     Education provided:   - small and ring red putty add ex  - Progress towards goals: Good     Written Home Exercises Provided: Patient instructed to cont prior HEP and today's program.  Exercises were reviewed and Raffy was able to demonstrate them prior to the end of the session.  Raffy demonstrated good  understanding of the education provided.     See EMR under Patient Instructions for exercises provided prior visit.    Raffy  demonstrated good  understanding of the education provided.         Assessment   Raffy Rutherford Jr. is a 68 y.o. male referred to outpatient occupational/hand therapy and presents with a medical diagnosis of left distal radius fracture, resulting in pain, decreased range of motion and decreased functional use of left hand/wrist. Advanced strengthening exercises with mild difficulty.    Pt would continue to benefit from skilled OT.      Raffy is progressing well towards his goals and there are no updates to goals at this time. Pt prognosis is Excellent.     Pt will continue to benefit from skilled outpatient occupational therapy to address the deficits listed in the problem list on initial evaluation provide pt/family education and to maximize pt's level of independence in the home and community environment.     Anticipated barriers to occupational therapy: none    Pt's spiritual, cultural and educational needs considered and pt agreeable to plan of care and goals.    Goals:  Short Term (4 weeks on 4/19/2019):  1)   Patient to be IND with HEP and modalities for pain management. Met 3/21/2019  2)   Increase ROM 15 to 20 degrees for wrist ext/flex  to increase functional hand use for playing his instrument.-Met 5/8/2019  3)   Measure  in 2-3 weeks.-Met 5/8/2019  4)   Decrease edema .2-.3 cm to increase joint mobility /flexibility for improved overall functional hand use.-Met 5/8/2019     Long Term (by discharge):  1)   Pt will report 2 out of 10 pain with playing his instrument.-progressing  2)   Patient to score at 40% or less on FOTO to demonstrate improved perception of functional left hand/wrist use.-progressing  3)   Pt will return to prior level of function for ADLs and household management.-progressing         Plan:    Certification Period/Plan of care expiration: 5/08/2019 to 7/08/2019.     Outpatient Occupational Therapy 2 times weekly for 8 weeks may include the following interventions: Paraffin,  Fluidotherapy, Manual therapy/joint mobilizations, Modalities for pain management, Therapeutic exercises/activities., Strengthening and Edema Control.       Discussed Plan of Care with patient: Yes  Updates/Grading for next session: Advance as tolerated      Mary Shi, OT

## 2019-06-15 DIAGNOSIS — M21.379 FOOT-DROP, UNSPECIFIED LATERALITY: Primary | ICD-10-CM

## 2019-06-17 ENCOUNTER — PATIENT MESSAGE (OUTPATIENT)
Dept: NEUROSURGERY | Facility: CLINIC | Age: 69
End: 2019-06-17

## 2019-06-17 ENCOUNTER — CLINICAL SUPPORT (OUTPATIENT)
Dept: REHABILITATION | Facility: HOSPITAL | Age: 69
End: 2019-06-17
Attending: NEUROLOGICAL SURGERY
Payer: COMMERCIAL

## 2019-06-17 DIAGNOSIS — M25.532 PAIN IN LEFT WRIST: ICD-10-CM

## 2019-06-17 PROCEDURE — 97530 THERAPEUTIC ACTIVITIES: CPT

## 2019-06-17 PROCEDURE — 97022 WHIRLPOOL THERAPY: CPT

## 2019-06-17 PROCEDURE — 97110 THERAPEUTIC EXERCISES: CPT

## 2019-06-17 NOTE — PROGRESS NOTES
Occupational Therapy Daily Treatment Note     Date: 6/17/2019  Name: Raffy Rutherford Jr.  Clinic Number: [id number]    Therapy Diagnosis:   Encounter Diagnosis   Name Primary?    Pain in left wrist      Physician: Dr Addi Bauer    Physician Orders: Distal radius fx, non-Op treatment  Eval and Treat, modalities as needed  1-2 times/week for 6+ weeks  Medical Diagnosis:  S52.502A (ICD-10-CM Closed fracture of distal end of left radius, unspecified fracture morphology, initial encounter    Imaging: MRI 5/17/2019   Impression       Partially comminuted, nondisplaced distal radial fracture with intra-articular extension and minimal step-off.    Focal tear of the TFCC near the radial attachment.    Sprain/partial tear of the scapholunate ligament central component.  Dorsal and volar bands appear intact.    Tendinosis of the ECU.       Surgical Procedure and Date: N/A  Evaluation Date: 3/19/2019  Insurance: [facility] [employer]  Insurance Authorization period Expiration: 12/31/2019  Plan of Care Certification Period: 5/8/2019 to 7/8/2019     Visit # / Visits Authortized: 6/8  Time In: 11:00 AM  Time Out: 12:00 PM  Total Time: 60 minutes  Charges: 1 TE, 2TA,  fluido     Precautions: Standard      Subjective     Pt reports: he was compliant with home exercise program given last session. Pt reports decreased pain today.  Response to previous treatment: Pt is playing his instrument more.   Functional change: none noted    Pain: 2/10  Location: left wrist      Objective   Edema. Measured in centimeters.    3/19/2019 3/19/2019 3/29/2019 5/8/2019 6/3/2019     Left Right Left Left Left   Proximal Wrist Crease 18 16.4 18 17.5 17.5   Mid palm 19.9 20.9 19.9 20.5 20.5   MCPs 20.5 21.4 20.5 20.5 20.5      Hand ROM: WNL     Sensation: Intact     Wrist ROM  Date 3/19/2019 3/19/2019 3/29/2018 5/8/2019 6/3/2019     Left Right Left Left Left   Supination/Pronation 80/90 85/90 80/90 80/90 85/90   Wrist ext/flex 40/20  70/75 50//25 60/35 60/50   Wrist RD/UD 20/20 35/50 25/20 25/20 30/35       Strength (Dyanmometer) and Pinch Strength (Pinch Gauge)  Measured in pounds and psi. Average of three trials.    3/19/2019 3/19/2019 3/29/2019 3/29/2019 5/8/2019 6/3/2019     Left Right Left Right Left Left   Rung II def def 43 85 64 65        Raffy received the following supervised modalities after being cleared for contradictions for 15 minutes:   -Patient received fluidotherapy to left hand/wrist for 15 minutes to increase blood flow, circulation, desensitization, sensory re-education and for pain management.      Raffy received the following manual therapy techniques for 5 minutes:   -STM to left wrist  -Kinesiotape to left wrist with 80% correction over ulnar side of wrist     Raffy received therapeutic exercises for 10 minutes including:  -AROM as follows: TGEs, finger abd/add, wrist ext/flex, sup/pron and RD/UD x 10 reps    Raffy received therapeutic activities for 30 minutes including:  -wrist wheel 3 ' each wrist flex/ext and sup/pron   -dextersizer x 3'  -hand gripper with 3 yellow bands x 30 reps for grasping   -red theraflex bar, frowns/smiles and wrist ext/flex x 1 min ea for gripping  -1 lb wrist 3 ways 1 min each for lifting activities   -red putty: 2' molding, 30 grasps, 1 min ring/small finger adduction for gripping activities        Home Exercises and Education Provided     Education provided:   - None today  - Progress towards goals: Good     Written Home Exercises Provided: Patient instructed to cont prior HEP and today's program.  Exercises were reviewed and Raffy was able to demonstrate them prior to the end of the session.  Raffy demonstrated good  understanding of the education provided.     See EMR under Patient Instructions for exercises provided prior visit.    Raffy demonstrated good  understanding of the education provided.         Assessment   Raffy Rutherford Jr. is a 68 y.o. male referred to outpatient  occupational/hand therapy and presents with a medical diagnosis of left distal radius fracture, resulting in pain, decreased range of motion and decreased functional use of left hand/wrist.  Continued strengthening exercises with no difficulty.    Pt would continue to benefit from skilled OT.      Raffy is progressing well towards his goals and there are no updates to goals at this time. Pt prognosis is Excellent.     Pt will continue to benefit from skilled outpatient occupational therapy to address the deficits listed in the problem list on initial evaluation provide pt/family education and to maximize pt's level of independence in the home and community environment.     Anticipated barriers to occupational therapy: none    Pt's spiritual, cultural and educational needs considered and pt agreeable to plan of care and goals.    Goals:  Short Term (4 weeks on 4/19/2019):  1)   Patient to be IND with HEP and modalities for pain management. Met 3/21/2019  2)   Increase ROM 15 to 20 degrees for wrist ext/flex  to increase functional hand use for playing his instrument.-Met 5/8/2019  3)   Measure  in 2-3 weeks.-Met 5/8/2019  4)   Decrease edema .2-.3 cm to increase joint mobility /flexibility for improved overall functional hand use.-Met 5/8/2019     Long Term (by discharge):  1)   Pt will report 2 out of 10 pain with playing his instrument.-progressing  2)   Patient to score at 40% or less on FOTO to demonstrate improved perception of functional left hand/wrist use.-progressing  3)   Pt will return to prior level of function for ADLs and household management.-progressing         Plan:    Certification Period/Plan of care expiration: 5/08/2019 to 7/08/2019.     Outpatient Occupational Therapy 2 times weekly for 8 weeks may include the following interventions: Paraffin, Fluidotherapy, Manual therapy/joint mobilizations, Modalities for pain management, Therapeutic exercises/activities., Strengthening and Edema  Control.       Discussed Plan of Care with patient: Yes  Updates/Grading for next session: Advance as tolerated      Mary Shi, OT

## 2019-07-06 DIAGNOSIS — M25.559 ARTHRALGIA OF HIP, UNSPECIFIED LATERALITY: Primary | ICD-10-CM

## 2019-07-06 NOTE — PROGRESS NOTES
Pt with history of hip replacement. New onset hip pain since travelling for a wedding. Wife is concerned due to recent root canal. Will get a crp and esr Monday morning and see him in clinic for further evaluation after that

## 2019-07-08 ENCOUNTER — LAB VISIT (OUTPATIENT)
Dept: LAB | Facility: HOSPITAL | Age: 69
End: 2019-07-08
Payer: COMMERCIAL

## 2019-07-08 DIAGNOSIS — M25.559 ARTHRALGIA OF HIP, UNSPECIFIED LATERALITY: ICD-10-CM

## 2019-07-08 LAB
CRP SERPL-MCNC: 1.3 MG/L (ref 0–8.2)
ERYTHROCYTE [SEDIMENTATION RATE] IN BLOOD BY WESTERGREN METHOD: 4 MM/HR (ref 0–23)

## 2019-07-08 PROCEDURE — 86140 C-REACTIVE PROTEIN: CPT

## 2019-07-08 PROCEDURE — 85652 RBC SED RATE AUTOMATED: CPT

## 2019-07-08 PROCEDURE — 36415 COLL VENOUS BLD VENIPUNCTURE: CPT

## 2019-07-09 ENCOUNTER — OFFICE VISIT (OUTPATIENT)
Dept: ORTHOPEDICS | Facility: CLINIC | Age: 69
End: 2019-07-09
Payer: COMMERCIAL

## 2019-07-09 ENCOUNTER — TELEPHONE (OUTPATIENT)
Dept: ORTHOPEDICS | Facility: CLINIC | Age: 69
End: 2019-07-09

## 2019-07-09 VITALS — BODY MASS INDEX: 26.36 KG/M2 | WEIGHT: 173.94 LBS | HEIGHT: 68 IN

## 2019-07-09 DIAGNOSIS — S52.502A CLOSED FRACTURE OF DISTAL END OF LEFT RADIUS, UNSPECIFIED FRACTURE MORPHOLOGY, INITIAL ENCOUNTER: Primary | ICD-10-CM

## 2019-07-09 DIAGNOSIS — M25.559 ARTHRALGIA OF HIP, UNSPECIFIED LATERALITY: Primary | ICD-10-CM

## 2019-07-09 PROCEDURE — 1101F PR PT FALLS ASSESS DOC 0-1 FALLS W/OUT INJ PAST YR: ICD-10-PCS | Mod: CPTII,S$GLB,, | Performed by: ORTHOPAEDIC SURGERY

## 2019-07-09 PROCEDURE — 99999 PR PBB SHADOW E&M-EST. PATIENT-LVL III: ICD-10-PCS | Mod: PBBFAC,,, | Performed by: ORTHOPAEDIC SURGERY

## 2019-07-09 PROCEDURE — 99999 PR PBB SHADOW E&M-EST. PATIENT-LVL III: CPT | Mod: PBBFAC,,, | Performed by: ORTHOPAEDIC SURGERY

## 2019-07-09 PROCEDURE — 99213 PR OFFICE/OUTPT VISIT, EST, LEVL III, 20-29 MIN: ICD-10-PCS | Mod: S$GLB,,, | Performed by: ORTHOPAEDIC SURGERY

## 2019-07-09 PROCEDURE — 99213 OFFICE O/P EST LOW 20 MIN: CPT | Mod: S$GLB,,, | Performed by: ORTHOPAEDIC SURGERY

## 2019-07-09 PROCEDURE — 1101F PT FALLS ASSESS-DOCD LE1/YR: CPT | Mod: CPTII,S$GLB,, | Performed by: ORTHOPAEDIC SURGERY

## 2019-07-09 RX ORDER — METHYLPREDNISOLONE 4 MG/1
TABLET ORAL
Qty: 21 TABLET | Refills: 0 | Status: SHIPPED | OUTPATIENT
Start: 2019-07-09 | End: 2019-07-30

## 2019-07-09 NOTE — TELEPHONE ENCOUNTER
Called patient with blood work results. He will try a medrol dose pack for relief and f/u if no better.

## 2019-07-09 NOTE — PROGRESS NOTES
"[facility]  History & Physical  Orthopedics    SUBJECTIVE:      Chief Complaint:  Left wrist pain    Referring Provider: No ref. provider found     History of Present Illness:  Patient is a 69 y.o. right hand dominant male who presents today with complaints of left wrist pain. He reports that at the end of January, 2018 he was vacationing in [facility] when he sustained a low energy fall onto his left wrist. He was found to have an intra-articular minimally displaced distal radius fracture upon follow-up in the [place] and was treated by closed means for his fracture. He was treated with initial casting and then a removable brace which was discontinued approximately 3 weeks ago per his report.  He has been attending occupational therapy for range of motion and exercises.  He notes that he has plateaued in his recovery and feels as if he is not making much progress and is still experiencing some pain which he rates 4 to 6/10.  Of note he does have a history of Dupuytren's disease and he reports that his left small finger seems to be rotating in abnormally.  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  He reports that his pain is global but does identify an area at the volar aspect of the wrist and the ulnar aspect of the wrist which are painful.  He presents for evaluation at the request of 1 of our physician's assistants.    Interval History: Patient presents today for follow up. He is recovering well after his wrist injury. He is back to playing guitar. He states that he has full ROM and strength in his wrist and fingers while playing, though he does notes a "strange sensation" in the ulnar aspect of his hand while playing that he says is non-painful. He is happy so far with the progress of his recovery. He has some slight TTP at the ulnar styloid and volar radial wrist, but this is improving. He states that he went out of town " for a few weeks so was lost to follow up with OT and is hoping to get back into therapy.     The patient is a/an musician, psychotherapist, .    Onset of symptoms/DOI was nearly 4 months ago.    Symptoms are aggravated by activity and movement.    Symptoms are alleviated by rest.    Symptoms consist of pain.    The patient rates their pain as a 4/10.    Attempted treatment(s) and/or interventions include rest, activity modification, anti-inflammatory medications, physical and/or occupational therapy, immobilization and splinting/casting.     The patient denies any fevers, chills, N/V, D/C and presents for evaluation.       Past Medical History:   Diagnosis Date    Adenomatous polyp 2003    Adenomatous polyp     Allergy     Arthritis     Back pain     after trauma beginning in 195    Cataract     Chronic pain     neck and left shoulder    Cluster headache 2013    Colon polyp     Degenerative disc disease     Depression     Diverticulitis 12/2013    Elevated PSA     Fibromyalgia 2013    GERD (gastroesophageal reflux disease)     Hepatitis 1970's    A    History of prostate biopsy 2002    Hyperlipidemia     Joint pain     Sleep apnea     Special screening for malignant neoplasms, colon 5/5/2014    Thyroid nodule 7/16/2014    Visual disturbance 2012    problems after cataract surgery     Past Surgical History:   Procedure Laterality Date    BACK SURGERY      BLEPHAROPLASTY UPPER LID Bilateral 2/10/2015    Performed by Rhett Lainez III, [facility] at [facility] OR 2ND FLR    BLOCK, GANGLION IMPAR N/A 6/25/2013    Performed by [facility]aceli Evans [facility] at [facility] PAIN MGT    CATARACT EXTRACTION W/  INTRAOCULAR LENS IMPLANT Bilateral     CHOLECYSTECTOMY      COLONOSCOPY N/A 5/5/2014    Performed by Pedro Carlos [facility] at [facility] ENDO (4TH FLR)    COSMETIC SURGERY  2/10/2015    Direct mid-forehead brow lift    COSMETIC SURGERY  2/10/2015    Bilateral upper lid blepahroplasty    ESOPHAGOGASTRODUODENOSCOPY  (EGD) N/A 4/28/2015    Performed by Amadou Hardin [facility] at [facility] ENDO (4TH FLR)    EYE SURGERY      FUSION EXTREME LATERAL N/A 6/22/2015    Performed by Milad Browne [facility] at [facility] OR 2ND FLR    HEMORRHOID SURGERY      with complication of chronic bleeding for 6 weeks     INJECTION, STEROID Right SI Joint Block and Steroid Injection Right 12/6/2018    [facility]dwell [facility] OR    JOINT REPLACEMENT      KNEE SURGERY      involving arthroscopic surgery to both knees    LIFT-BROW midforehead direct Bilateral 2/10/2015    Performed by Rhett [facility]z III, [facility] at [facility] OR 2ND FLR    SINUS SURGERY      SINUS SURGERY      left molar and sinus surgery for trigeminal neuralgia    SPINAL FUSION  6/22/2015    L3-L5 XLIF    SPINE SURGERY  6/22/15    XLIF/TANA    TOTAL HIP ARTHROPLASTY  4/2012    Pt states he had total hip replacement on his left hip.     Review of patient's allergies indicates:   Allergen Reactions    Alphagan [brimonidine]      Patient taking MASO-B Selective Inhibitor Selegiline (Emsam)    Coumadin [warfarin]      itch    Oxycodone      hiccups    Pennsaid [diclofenac sodium]      Social History     Social History Narrative    Not on file     Family History   Problem Relation Age of Onset    Cancer Mother         colon; uterine    Nephrolithiasis Mother     Stroke Mother 86    Pancreatitis Brother     Vision loss Brother         macular degeneration    Diabetes Brother     Macular degeneration Brother     Migraines Sister     No Known Problems Father     No Known Problems Maternal Grandmother     No Known Problems Maternal Grandfather     No Known Problems Paternal Grandmother     No Known Problems Paternal Grandfather     No Known Problems Sister     No Known Problems Maternal Aunt     No Known Problems Maternal Uncle     No Known Problems Paternal Aunt     No Known Problems Paternal Uncle     Melanoma Neg Hx     Amblyopia Neg Hx     Blindness Neg Hx      Cataracts Neg Hx     Glaucoma Neg Hx     Hypertension Neg Hx     Retinal detachment Neg Hx     Strabismus Neg Hx     Thyroid disease Neg Hx     Allergic rhinitis Neg Hx     Allergies Neg Hx     Angioedema Neg Hx     Asthma Neg Hx     Eczema Neg Hx     Urticaria Neg Hx     Rhinitis Neg Hx     Immunodeficiency Neg Hx     Atopy Neg Hx          Current Outpatient Medications:     clonazePAM (KLONOPIN) 0.5 MG tablet, take 2 tablets by mouth every night at bedtime, Disp: 60 tablet, Rfl: 5    diclofenac sodium (VOLTAREN) 1 % Gel, Apply 2 g topically once daily., Disp: 100 g, Rfl: 0    HYDROcodone-acetaminophen (NORCO) 5-325 mg per tablet, Take 1 tablet by mouth every 8 (eight) hours as needed for Pain (moderate to severe)., Disp: 21 tablet, Rfl: 0    lamoTRIgine (LAMICTAL) 200 MG tablet, Take one tablet by mouth once daily, Disp: 90 tablet, Rfl: 3    lithium (ESKALITH) 300 MG capsule, Take 150 mg by mouth 2 (two) times daily., Disp: , Rfl:     lithium (LITHOTAB) 300 mg tablet, Take 1 tablet by mouth at bedtime, Disp: 30 tablet, Rfl: 11    lithium carbonate 150 MG capsule, Take 1 capsule (150 mg total) by mouth 2 (two) times daily., Disp: 60 capsule, Rfl: 5    methylPREDNISolone (MEDROL DOSEPACK) 4 mg tablet, use as directed, Disp: 21 tablet, Rfl: 0    pravastatin (PRAVACHOL) 40 MG tablet, Take 1 tablet (40 mg total) by mouth once daily., Disp: 90 tablet, Rfl: 3    RABEprazole (ACIPHEX) 20 mg tablet, Take 1 tablet (20 mg total) by mouth 2 (two) times daily., Disp: 180 tablet, Rfl: 3    selegiline (EMSAM) 12 mg/24 hr, Place 1 patch onto the skin once daily., Disp: 90 patch, Rfl: 3    selegiline (EMSAM) 12 mg/24 hr, Place onto the skin once daily., Disp: 90 patch, Rfl: 3    senna-docusate 8.6-50 mg (PERICOLACE) 8.6-50 mg per tablet, Take 2 tablets by mouth nightly as needed for Constipation., Disp: , Rfl:     sucralfate (CARAFATE) 1 gram tablet, as needed. , Disp: , Rfl:     traZODone  "(DESYREL) 100 MG tablet, Take one tablet by mouth every night at bedtime, Disp: 90 tablet, Rfl: 3    VOLTAREN 1 % Gel, , Disp: , Rfl:       Review of Systems:  Constitutional: no fever or chills  Eyes: no visual changes  ENT: no nasal congestion or sore throat  Respiratory: no cough or shortness of breath  Cardiovascular: no chest pain  Gastrointestinal: no nausea or vomiting, tolerating diet  Musculoskeletal: pain, soreness and decreased ROM    OBJECTIVE:      Vital Signs (Most Recent):  Vitals:    07/09/19 0911   Weight: 78.9 kg (173 lb 15.1 oz)   Height: 5' 8" (1.727 m)     Body mass index is 26.45 kg/m².      Physical Exam:  Constitutional: The patient appears well-developed and well-nourished. No distress.   Head: Normocephalic and atraumatic.   Nose: Nose normal.   Eyes: Conjunctivae and EOM are normal.   Neck: No tracheal deviation present.   Cardiovascular: Normal rate and intact distal pulses.    Pulmonary/Chest: Effort normal. No respiratory distress.   Abdominal: There is no guarding.   Neurological: The patient is alert.   Psychiatric: The patient has a normal mood and affect.     Left Hand/Wrist Examination:    Observation/Inspection:  Swelling  none    Deformity  none  Discoloration  none     Scars   none    Atrophy  None  There are some Dupuytren's cords throughout the patient's left hand to the small long and ring fingers.  There is no significant contracture of the MCP or PIP joints associated with this.    HAND/WRIST EXAMINATION:  Finkelstein's Test   Neg  WHAT Test    Neg  Snuff box tenderness   Neg  Ochoa's Test    Neg  Hook of Hamate Tenderness  Neg  CMC grind    Neg  Circumduction test   Neg    Neurovascular Exam:  Digits WWP, brisk CR < 3s throughout  NVI motor/LTS to M/R/U nerves, radial pulse 2+  Tinel's Test - Carpal Tunnel  positive  Tinel's Test - Cubital Tunnel  Neg      ROM hand/elbow full, painless  Range of motion left wrist is to approximately 35° extension, 45° flexion, pronation " to 70°, supination to 50°.    Diagnostic Results:     Xray -  healing intra-articular left distal radius fracture in overall good alignment with no evidence of complication  MRI left wrist-Partially comminuted, nondisplaced distal radial fracture with intra-articular extension and minimal step-off.    Focal tear of the TFCC near the radial attachment.    Sprain/partial tear of the scapholunate ligament central component.  Dorsal and volar bands appear intact.    Tendinosis of the ECU.    ASSESSMENT/PLAN:      69 y.o. yo male with healing left distal radius fracture and additional MRI findings per above  Plan:  Patient is recovering well from his wrist injury. He is back to playing his instruments with really no issues. I will write for him to get back into OT today. Continue to take NSAIDs for pain. He will follow up PRN.         Addi Bauer M.D.     Please be aware that this note has been generated with the assistance of [employer] voice-to-text.  Please excuse any spelling or grammatical errors.

## 2019-07-09 NOTE — PROGRESS NOTES
Pt with hip pain following a lot of traveling for family weddings. The pain is located to the lateral hip and the groin and is better with advil. Blood work is negative for infection and he is walking without difficulty. He does have GI distress with prescription nsaids, so will try a medrol dose pack as pain is most likely related to tendinitis/inflammation.

## 2019-07-15 ENCOUNTER — HOSPITAL ENCOUNTER (OUTPATIENT)
Dept: RADIOLOGY | Facility: HOSPITAL | Age: 69
Discharge: HOME | End: 2019-07-15
Attending: NURSE PRACTITIONER
Payer: COMMERCIAL

## 2019-07-15 ENCOUNTER — OFFICE VISIT (OUTPATIENT)
Dept: ORTHOPEDICS | Facility: CLINIC | Age: 69
End: 2019-07-15
Payer: COMMERCIAL

## 2019-07-15 VITALS — BODY MASS INDEX: 26.65 KG/M2 | HEIGHT: 68 IN | WEIGHT: 175.81 LBS

## 2019-07-15 DIAGNOSIS — M25.552 LEFT HIP PAIN: ICD-10-CM

## 2019-07-15 DIAGNOSIS — M25.562 ACUTE PAIN OF LEFT KNEE: ICD-10-CM

## 2019-07-15 DIAGNOSIS — M25.552 LEFT HIP PAIN: Primary | ICD-10-CM

## 2019-07-15 DIAGNOSIS — M70.62 TROCHANTERIC BURSITIS, LEFT HIP: ICD-10-CM

## 2019-07-15 PROCEDURE — 20610 DRAIN/INJ JOINT/BURSA W/O US: CPT | Mod: LT,S$GLB,, | Performed by: NURSE PRACTITIONER

## 2019-07-15 PROCEDURE — 73502 X-RAY EXAM HIP UNI 2-3 VIEWS: CPT | Mod: 26,LT,, | Performed by: RADIOLOGY

## 2019-07-15 PROCEDURE — 1101F PR PT FALLS ASSESS DOC 0-1 FALLS W/OUT INJ PAST YR: ICD-10-PCS | Mod: CPTII,S$GLB,, | Performed by: NURSE PRACTITIONER

## 2019-07-15 PROCEDURE — 73564 X-RAY EXAM KNEE 4 OR MORE: CPT | Mod: 26,LT,, | Performed by: RADIOLOGY

## 2019-07-15 PROCEDURE — 99213 OFFICE O/P EST LOW 20 MIN: CPT | Mod: 25,S$GLB,, | Performed by: NURSE PRACTITIONER

## 2019-07-15 PROCEDURE — 73502 XR HIP 2 VIEW LEFT: ICD-10-PCS | Mod: 26,LT,, | Performed by: RADIOLOGY

## 2019-07-15 PROCEDURE — 73562 X-RAY EXAM OF KNEE 3: CPT | Mod: 26,XS,LT, | Performed by: RADIOLOGY

## 2019-07-15 PROCEDURE — 20610 PR DRAIN/INJECT LARGE JOINT/BURSA: ICD-10-PCS | Mod: LT,S$GLB,, | Performed by: NURSE PRACTITIONER

## 2019-07-15 PROCEDURE — 99999 PR PBB SHADOW E&M-EST. PATIENT-LVL III: CPT | Mod: PBBFAC,,, | Performed by: NURSE PRACTITIONER

## 2019-07-15 PROCEDURE — 99999 PR PBB SHADOW E&M-EST. PATIENT-LVL III: ICD-10-PCS | Mod: PBBFAC,,, | Performed by: NURSE PRACTITIONER

## 2019-07-15 PROCEDURE — 73562 X-RAY EXAM OF KNEE 3: CPT | Mod: TC,LT

## 2019-07-15 PROCEDURE — 1101F PT FALLS ASSESS-DOCD LE1/YR: CPT | Mod: CPTII,S$GLB,, | Performed by: NURSE PRACTITIONER

## 2019-07-15 PROCEDURE — 73502 X-RAY EXAM HIP UNI 2-3 VIEWS: CPT | Mod: TC,LT

## 2019-07-15 PROCEDURE — 73562 XR KNEE ORTHO LEFT WITH FLEXION: ICD-10-PCS | Mod: 26,XS,LT, | Performed by: RADIOLOGY

## 2019-07-15 PROCEDURE — 73564 XR KNEE ORTHO LEFT WITH FLEXION: ICD-10-PCS | Mod: 26,LT,, | Performed by: RADIOLOGY

## 2019-07-15 PROCEDURE — 99213 PR OFFICE/OUTPT VISIT, EST, LEVL III, 20-29 MIN: ICD-10-PCS | Mod: 25,S$GLB,, | Performed by: NURSE PRACTITIONER

## 2019-07-15 RX ORDER — TRIAMCINOLONE ACETONIDE 40 MG/ML
40 INJECTION, SUSPENSION INTRA-ARTICULAR; INTRAMUSCULAR
Status: COMPLETED | OUTPATIENT
Start: 2019-07-15 | End: 2019-07-15

## 2019-07-15 RX ADMIN — TRIAMCINOLONE ACETONIDE 40 MG: 40 INJECTION, SUSPENSION INTRA-ARTICULAR; INTRAMUSCULAR at 10:07

## 2019-07-15 NOTE — PROGRESS NOTES
CC: Pain of the Left Hip      HPI: Pt with c/o left lateral hip pain and left medial knee pain for the past few weeks. He and his wife recently took a long car ride for a family wedding and the pain started while they were on the trip. He has a history of left hip replacement for djd. He has had trochanteric bursitis in the past, but due to having the hip replaced he did go to the lab when he returned from his trip for a crp/esr to rule out infection. The blood work was negative for infection. He was given a medrol dose pack and that has helped his pain about 25% and he also reports that he has been taking advil. He is seeing a neurologist for drop foot at [facility] and is interested in getting an appt with Physical Medicine at [facility]. His wife told him about a PM&R doctor that also does neurology, but I'm not sure who she is referring to. He will speak to his neurologist about that tomorrow. His neurologist had him in aqua therapy and that was helping him a lot, but his insurance has run out on visits. He would like to try aqua therapy to help with his hip and knee pain and he states that his medicare will cover it even if [employer] doesn't.   The pain in the hip is aching and located to the lateral hip on the left. It is reproducible with pressure over the greater trochanter. The pain in his knee is also aching and is reproducible with pressure over the mcl insertion site. Both pains are worse with activity. He wears afo braces for his drop foot, but otherwise does not use an assistive device.    ROS  General: denies fever and chills  Resp: no c/o sob  CVS: no c/o cp  MSK: c/o left lateral hip pain which is aching and located over the greater trochanter. C/o left knee pain which is aching and located to the medial knee    PE  General: AAOx3, pleasant and cooperative  Resp: respirations even and unlabored  MSK: left hip exam  - [facility]  - straight leg raise  - pain with internal rotation  - pain with external rotation  +  pain over the greater trochanter    Left knee exam  0 degrees extension  130 degrees flexion  No erythema or warmth  - effusion  - crepitus  - varus and valgus stress      Xray:  Ordered and reviewed by me with the patient  Knee: no joint space narrowing or fracture/dislocation noted    Hip: No evidence ofrecent or healing fracture, lytic destructive process, development of abnormal periprosthetic lucency, or other detrimental change since 06/15/2018 is appreciated.  SI joints appear unremarkable.  Incidental note is again made of visualization of the inferior aspect of instrumentation related to a prior lumbar spinal fusion procedure.    Assessment:  Left knee pain likely related to mcl sprain  Trochanteric bursitis, left    Plan:  Cortisone injection left greater trochanter today  advil prn  Orders for aqua therapy entered for trochanteric bursitis   F/u for MRI of left knee if no improvement    Greater Trochanter Bursa Injection Procedure Note   Diagnosis: left greater trochanteric bursitis  Indications: left hip pain  Procedure Details: Verbal consent was obtained for the procedure. The injection site was identified and the skin was prepared with alcohol. The left hip was injected from a lateral approach with 1 ml of Kenalog and 3 ml Lidocaine under sterile technique using a 25 gauge 1 1/2 inch needle. The needle was removed and the area cleansed and dressed.  Complications:  Patient tolerated the procedure well.    he was advised to rest the leg today, using ice and Tylenol as needed for comfort and swelling. he may have an increase in discomfort tonight followed by steady improvement over the next several days. It may take 1-3 weeks following the injection to get the full benefit of the medication.

## 2019-07-16 ENCOUNTER — OFFICE VISIT (OUTPATIENT)
Dept: NEUROSURGERY | Facility: CLINIC | Age: 69
End: 2019-07-16
Payer: COMMERCIAL

## 2019-07-16 VITALS
HEART RATE: 53 BPM | TEMPERATURE: 98 F | DIASTOLIC BLOOD PRESSURE: 77 MMHG | WEIGHT: 175 LBS | BODY MASS INDEX: 26.52 KG/M2 | SYSTOLIC BLOOD PRESSURE: 119 MMHG | HEIGHT: 68 IN

## 2019-07-16 DIAGNOSIS — M21.371 BILATERAL FOOT-DROP: Primary | ICD-10-CM

## 2019-07-16 DIAGNOSIS — M21.372 BILATERAL FOOT-DROP: Primary | ICD-10-CM

## 2019-07-16 DIAGNOSIS — G60.9 IDIOPATHIC PERIPHERAL NEUROPATHY: ICD-10-CM

## 2019-07-16 DIAGNOSIS — M53.3 SACROILIAC JOINT DYSFUNCTION OF RIGHT SIDE: ICD-10-CM

## 2019-07-16 PROCEDURE — 99213 OFFICE O/P EST LOW 20 MIN: CPT | Mod: S$GLB,,, | Performed by: NEUROLOGICAL SURGERY

## 2019-07-16 PROCEDURE — 99999 PR PBB SHADOW E&M-EST. PATIENT-LVL V: CPT | Mod: PBBFAC,,, | Performed by: NEUROLOGICAL SURGERY

## 2019-07-16 PROCEDURE — 1101F PT FALLS ASSESS-DOCD LE1/YR: CPT | Mod: CPTII,S$GLB,, | Performed by: NEUROLOGICAL SURGERY

## 2019-07-16 PROCEDURE — 99999 PR PBB SHADOW E&M-EST. PATIENT-LVL V: ICD-10-PCS | Mod: PBBFAC,,, | Performed by: NEUROLOGICAL SURGERY

## 2019-07-16 PROCEDURE — 99213 PR OFFICE/OUTPT VISIT, EST, LEVL III, 20-29 MIN: ICD-10-PCS | Mod: S$GLB,,, | Performed by: NEUROLOGICAL SURGERY

## 2019-07-16 PROCEDURE — 1101F PR PT FALLS ASSESS DOC 0-1 FALLS W/OUT INJ PAST YR: ICD-10-PCS | Mod: CPTII,S$GLB,, | Performed by: NEUROLOGICAL SURGERY

## 2019-07-16 NOTE — PROGRESS NOTES
NEUROSURGICAL PROGRESS NOTE    DATE OF SERVICE:  07/16/2019    ATTENDING PHYSICIAN:  Jason Caldwell MD    SUBJECTIVE:    INTERIM HISTORY:    This is a very pleasant 69 y.o. male, who is status post L3-5 fusion, bilateral peroneal neuropathy and foot drop. S-P left hip replacement.  Recent left trochanteric bursa steroid injection. He is s/p right SI joint block and steroid injection in December 2018 which gave him 5 months of significant pain relief.  He had more than 75% pain relief for the duration of the block.  He has been doing SI joint physical therapy and pull therapy with significant improvement in his SI joint pain.  He has stopped doing pool therapy recently after his insurance.  His right SI joint pain has become severe again.  Pain is triggered by walking, climbing up stairs, sitting for prolonged period of time.  He wants to resume post therapy.  He is wearing his SI joint belt with some pain relief.  No new weakness or numbness.    Low Back Pain Scale  R Low Back-Pain Score: 7  R Low Back-Pain Intensity: Pain killers give moderate relief from pain  R Low Back-Pain Score: I need some help, but manage most of my personal care  Low Back-Lifting: I can only lift very light weights   Low Back-Walking: Pain prevents me walking more than .5 mile   Low Back-Sitting: Pain prevents me from sitting more than 1 hour   Low Back-Standing: I cannot stand for longer than 30 mins without increasing pain   Low Back-Sleeping: I have pain in bed but it does not prevent me from sleeping well   Low Back-Social Life: Pain has no significant effect on my social life apart from limiting my more en   Low Back-Traveling: I have extra pain while traveling but it does not compel me to seek alternate forms of travel   Low Back-Changing Degree of Pain: My pain seems to be getting better but improvement is slow         PAST MEDICAL HISTORY:  Active Ambulatory Problems     Diagnosis Date Noted    Depression     Hyperlipidemia      Chronic pain     Colon polyp     Adenomatous polyp     History of prostate biopsy     GERD (gastroesophageal reflux disease)     Back pain     Sleep apnea     Visual disturbances 10/03/2012    Spondylosis without myelopathy 11/09/2012    Degeneration of lumbar or lumbosacral intervertebral disc 11/09/2012    Spinal stenosis, lumbar region, without neurogenic claudication 11/09/2012    Thoracic or lumbosacral neuritis or radiculitis, unspecified 11/09/2012    Displacement of lumbar intervertebral disc without myelopathy 11/09/2012    Acquired spondylolisthesis 11/09/2012    Lumbago 11/09/2012    Status post cataract extraction and insertion of intraocular lens - Both Eyes 11/13/2012    Prostatitis, acute 12/17/2012    Fibromyalgia 10/21/2013    OP (osteoporosis) 10/21/2013    Compression fracture of T12 vertebra 10/21/2013    Diverticulitis large intestine 12/21/2013    Osteoarthritis 03/19/2014    Lower back pain 04/01/2014    Lower extremity pain 04/01/2014    Decreased strength 04/01/2014    Range of motion deficit 04/01/2014    Special screening for malignant neoplasms, colon 05/05/2014    Cervicalgia 06/19/2014    Facet joint disease of cervical region 06/19/2014    Occipital neuralgia 06/19/2014    Chronic migraine without aura, with intractable migraine, so stated, with status migrainosus 06/19/2014    Cervical radiculopathy 06/19/2014    Paroxysmal hemicrania 06/19/2014    Sciatic nerve pain 06/19/2014    Chronic LBP 06/27/2014    DJD (degenerative joint disease) of lumbar spine 06/27/2014    Chronic neck pain 06/27/2014    Right lumbar radiculopathy 06/27/2014    Thyroid nodule 07/16/2014    Carpal tunnel syndrome of right wrist 07/16/2014    Paresthesia 08/01/2014    Brow ptosis 02/10/2015    Degenerative disc disease 06/22/2015    Encounter for postoperative wound check 08/01/2015    Lumbar radiculopathy 09/15/2015    Cervical spondylosis 10/22/2015    Low back  pain without sciatica 10/27/2015    Lumbar radicular pain 10/27/2015    S/P lumbar spinal fusion 12/02/2015    Gait abnormality 02/21/2017    Impaired functional mobility, balance, gait, and endurance 02/24/2017    Left hip pain 05/24/2017    Right wrist tendinitis 07/28/2017    Pain in right wrist 07/28/2017    Difficulty walking 11/01/2017    Weakness 11/01/2017    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    Headache around the eyes 06/26/2018    Lumbar back pain with radiculopathy affecting lower extremity 11/15/2018    Closed fracture of left distal radius 02/05/2019    Pain in left wrist 03/19/2019     Resolved Ambulatory Problems     Diagnosis Date Noted    Blepharitis of both eyes - Both Eyes 11/13/2012     Past Medical History:   Diagnosis Date    Adenomatous polyp 2003    Adenomatous polyp     Allergy     Arthritis     Back pain     Cataract     Chronic pain     Cluster headache 2013    Colon polyp     Degenerative disc disease     Depression     Diverticulitis 12/2013    Elevated PSA     Fibromyalgia 2013    GERD (gastroesophageal reflux disease)     Hepatitis 1970's    History of prostate biopsy 2002    Hyperlipidemia     Joint pain     Sleep apnea     Special screening for malignant neoplasms, colon 5/5/2014    Thyroid nodule 7/16/2014    Visual disturbance 2012       PAST SURGICAL HISTORY:  Past Surgical History:   Procedure Laterality Date    BACK SURGERY      BLEPHAROPLASTY UPPER LID Bilateral 2/10/2015    Performed by Rhett Lainez III, MD at [facility] OR 2ND FLR    BLOCK, GANGLION IMPAR N/A 6/25/2013    [facility] PAIN MGT    CATARACT EXTRACTION W/  INTRAOCULAR LENS IMPLANT Bilateral     CHOLECYSTECTOMY      COLONOSCOPY N/A 5/5/2014    Performed by Pedro Carlos MD at [facility] ENDO (4TH FLR)    COSMETIC SURGERY  2/10/2015    Direct mid-forehead brow lift    COSMETIC SURGERY  2/10/2015    Bilateral upper  lid blepahroplasty    ESOPHAGOGASTRODUODENOSCOPY (EGD) N/A 4/28/2015    Performed by [facility]adou Hardin [facility] at [facility] ENDO (4TH FLR)    EYE SURGERY      FUSION EXTREME LATERAL N/A 6/22/2015    Performed by Milad Browne MD at [facility] OR 2ND FLR    HEMORRHOID SURGERY      with complication of chronic bleeding for 6 weeks     INJECTION, STEROID Right SI Joint Block and Steroid Injection Right 12/6/2018    [facility]    JOINT REPLACEMENT      KNEE SURGERY      involving arthroscopic surgery to both knees    LIFT-BROW midforehead direct Bilateral 2/10/2015    Performed by Rhett Lainez III, [facility] at [facility] OR 2ND FLR    SINUS SURGERY      SINUS SURGERY      left molar and sinus surgery for trigeminal neuralgia    SPINAL FUSION  6/22/2015    L3-L5 XLIF    SPINE SURGERY  6/22/15    XLIF/TANA    TOTAL HIP ARTHROPLASTY  4/2012    Pt states he had total hip replacement on his left hip.       SOCIAL HISTORY:   Social History     Socioeconomic History    Marital status:      Spouse name: Not on file    Number of children: Not on file    Years of education: Not on file    Highest education level: Not on file   Occupational History    Occupation: Psychotherpist    Social Needs    Financial resource strain: Not on file    Food insecurity:     Worry: Not on file     Inability: Not on file    Transportation needs:     Medical: Not on file     Non-medical: Not on file   Tobacco Use    Smoking status: Never Smoker    Smokeless tobacco: Never Used   Substance and Sexual Activity    Alcohol use: Yes     Comment: occasion    Drug use: No    Sexual activity: Yes     Partners: Female   Lifestyle    Physical activity:     Days per week: Not on file     Minutes per session: Not on file    Stress: Not on file   Relationships    Social connections:     Talks on phone: Not on file     Gets together: Not on file     Attends Evangelical service: Not on file     Active member  of club or organization: Not on file     Attends meetings of clubs or organizations: Not on file     Relationship status: Not on file   Other Topics Concern    Not on file   Social History Narrative    Not on file       FAMILY HISTORY:  Family History   Problem Relation Age of Onset    Cancer Mother         colon; uterine    Nephrolithiasis Mother     Stroke Mother 86    Pancreatitis Brother     Vision loss Brother         macular degeneration    Diabetes Brother     Macular degeneration Brother     Migraines Sister     No Known Problems Father     No Known Problems Maternal Grandmother     No Known Problems Maternal Grandfather     No Known Problems Paternal Grandmother     No Known Problems Paternal Grandfather     No Known Problems Sister     No Known Problems Maternal Aunt     No Known Problems Maternal Uncle     No Known Problems Paternal Aunt     No Known Problems Paternal Uncle     Melanoma Neg Hx     Amblyopia Neg Hx     Blindness Neg Hx     Cataracts Neg Hx     Glaucoma Neg Hx     Hypertension Neg Hx     Retinal detachment Neg Hx     Strabismus Neg Hx     Thyroid disease Neg Hx     Allergic rhinitis Neg Hx     Allergies Neg Hx     Angioedema Neg Hx     Asthma Neg Hx     Eczema Neg Hx     Urticaria Neg Hx     Rhinitis Neg Hx     Immunodeficiency Neg Hx     Atopy Neg Hx        CURRENTS MEDICATIONS:  Current Outpatient Medications on File Prior to Visit   Medication Sig Dispense Refill    clonazePAM (KLONOPIN) 0.5 MG tablet take 2 tablets by mouth every night at bedtime 60 tablet 5    diclofenac sodium (VOLTAREN) 1 % Gel Apply 2 g topically once daily. 100 g 0    HYDROcodone-acetaminophen (NORCO) 5-325 mg per tablet Take 1 tablet by mouth every 8 (eight) hours as needed for Pain (moderate to severe). 21 tablet 0    lamoTRIgine (LAMICTAL) 200 MG tablet Take one tablet by mouth once daily 90 tablet 3    lithium (ESKALITH) 300 MG capsule Take 150 mg by mouth 2 (two)  times daily.      lithium (LITHOTAB) 300 mg tablet Take 1 tablet by mouth at bedtime 30 tablet 11    lithium carbonate 150 MG capsule Take 1 capsule (150 mg total) by mouth 2 (two) times daily. 60 capsule 5    pravastatin (PRAVACHOL) 40 MG tablet Take 1 tablet (40 mg total) by mouth once daily. 90 tablet 3    RABEprazole (ACIPHEX) 20 mg tablet Take 1 tablet (20 mg total) by mouth 2 (two) times daily. 180 tablet 3    selegiline (EMSAM) 12 mg/24 hr Place 1 patch onto the skin once daily. 90 patch 3    selegiline (EMSAM) 12 mg/24 hr Place onto the skin once daily. 90 patch 3    senna-docusate 8.6-50 mg (PERICOLACE) 8.6-50 mg per tablet Take 2 tablets by mouth nightly as needed for Constipation.      sucralfate (CARAFATE) 1 gram tablet as needed.       traZODone (DESYREL) 100 MG tablet Take one tablet by mouth every night at bedtime 90 tablet 3    VOLTAREN 1 % Gel       azithromycin (Z-UZAIR) 250 MG tablet Take 2 tablets by mouth on day 1, then day 2-5 take 1 tablet daily 6 tablet 0    methylPREDNISolone (MEDROL DOSEPACK) 4 mg tablet use as directed 21 tablet 0     No current facility-administered medications on file prior to visit.        ALLERGIES:  Review of patient's allergies indicates:   Allergen Reactions    Alphagan [brimonidine]      Patient taking MASO-B Selective Inhibitor Selegiline (Emsam)    Coumadin [warfarin]      itch    Oxycodone      hiccups    Pennsaid [diclofenac sodium]        REVIEW OF SYSTEMS:  Review of Systems   Constitutional: Negative for diaphoresis, fever and weight loss.   Respiratory: Negative for shortness of breath.    Cardiovascular: Negative for chest pain.   Gastrointestinal: Negative for blood in stool.   Genitourinary: Negative for hematuria.   Endo/Heme/Allergies: Does not bruise/bleed easily.   All other systems reviewed and are negative.        OBJECTIVE:    PHYSICAL EXAMINATION:   Vitals:    07/16/19 0953   BP: 119/77   Pulse: (!) 53   Temp: 98.2 °F (36.8 °C)        Physical Exam:  Vitals reviewed.    Constitutional: He appears well-developed and well-nourished.     Eyes: Pupils are equal, round, and reactive to light. Conjunctivae and EOM are normal.     Cardiovascular: Normal distal pulses and no edema.     Abdominal: Soft.     Skin: Skin displays no rash on trunk and no rash on extremities. Skin displays no lesions on trunk and no lesions on extremities.     Psych/Behavior: He is alert. He is oriented to person, place, and time. He has a normal mood and affect.     Musculoskeletal:        Neck: Range of motion is full.     Neurological:        DTRs: Tricep reflexes are 2+ on the right side and 2+ on the left side. Bicep reflexes are 2+ on the right side and 2+ on the left side. Brachioradialis reflexes are 2+ on the right side and 2+ on the left side. Patellar reflexes are 2+ on the right side and 2+ on the left side. Achilles reflexes are 0 on the right side and 0 on the left side.       Back Exam     Tenderness   The patient is experiencing tenderness in the sacroiliac (Right-sided).    Range of Motion   Extension: abnormal   Flexion: abnormal   Lateral bend right: normal   Lateral bend left: normal   Rotation right: normal   Rotation left: normal     Muscle Strength   Right Quadriceps:  5/5   Left Quadriceps:  5/5   Right Hamstrings:  5/5   Left Hamstrings:  5/5     Tests   Straight leg raise right: negative  Straight leg raise left: negative    Other   Toe walk: normal  Heel walk: normal            SI joint:   Palpation at the right is painful  ABDIEL test is positive on the right side  Gaenslen test is positive on the right side  Thigh thrust test is positive on the right side    Neurologic Exam     Mental Status   Oriented to person, place, and time.   Speech: speech is normal   Level of consciousness: alert    Cranial Nerves   Cranial nerves II through XII intact.     CN III, IV, VI   Pupils are equal, round, and reactive to light.  Extraocular motions are  normal.     Motor Exam   Muscle bulk: normal  Overall muscle tone: normal    Strength   Right deltoid: 5/5  Left deltoid: 5/5  Right biceps: 5/5  Left biceps: 5/5  Right triceps: 5/5  Left triceps: 5/5  Right wrist flexion: 5/5  Left wrist flexion: 5/5  Right wrist extension: 5/5  Left wrist extension: 5/5  Right interossei: 5/5  Left interossei: 5/5  Right iliopsoas: 5/5  Left iliopsoas: 5/5  Right quadriceps: 5/5  Left quadriceps: 5/5  Right hamstrin/5  Left hamstrin/5  Right anterior tibial: 4/5  Left anterior tibial: 4/5  Right posterior tibial: 4/5  Left posterior tibial: 4/5  Right peroneal: 4/5  Left peroneal: 4/5  Right gastroc: 4/5  Left gastroc: 4/5    Sensory Exam   Light touch normal.   Pinprick normal.     Gait, Coordination, and Reflexes     Reflexes   Right brachioradialis: 2+  Left brachioradialis: 2+  Right biceps: 2+  Left biceps: 2+  Right triceps: 2+  Left triceps: 2+  Right patellar: 2+  Left patellar: 2+  Right achilles: 0  Left achilles: 0  Right plantar: normal  Left plantar: normal  Right De Leon: absent  Left De Leon: absent  Right ankle clonus: absent  Left ankle clonus: absent        DIAGNOSTIC DATA:  I personally interpreted the following imaging:   Lumbar spine MRI 2018 shows L3-L5 fusion, no significant facet arthropathy at L5-S1    ASSESMENT:  This is a 69 y.o. male with     Problem List Items Addressed This Visit     None      Visit Diagnoses     Bilateral foot-drop    -  Primary    Relevant Orders    Ambulatory consult to Neurology    Idiopathic peripheral neuropathy        Relevant Orders    Ambulatory consult to Neurology    Sacroiliac joint dysfunction of right side                PLAN:  Patient will get his EMG nerve conduction study reports from [facility]  Will refer in Neurology for his chronic peripheral neuropathy  Will repeat right SI joint block and steroid injection  Continue SI joint physical therapy/pool therapy            Jason Caldwell MD  Cell:[phone]

## 2019-07-17 ENCOUNTER — TELEPHONE (OUTPATIENT)
Dept: NEUROSURGERY | Facility: CLINIC | Age: 69
End: 2019-07-17

## 2019-07-17 DIAGNOSIS — M53.3 SACROILIAC JOINT DYSFUNCTION OF RIGHT SIDE: Primary | ICD-10-CM

## 2019-08-02 NOTE — PLAN OF CARE
Scheduled for surgery on 8/16/19- no PAT appointment needed per Dr. Caldwell.  Attempted to call patient in order to give preop instructions- no answer.

## 2019-08-12 ENCOUNTER — TELEPHONE (OUTPATIENT)
Dept: NEUROSURGERY | Facility: CLINIC | Age: 69
End: 2019-08-12

## 2019-08-12 NOTE — TELEPHONE ENCOUNTER
----- Message from Mariah Dan sent at 8/9/2019  4:33 PM CDT -----  Contact: [phone]/self  Patient would like to cancel and reschedule his procedure.  Please call him to reschedule. Thanks

## 2019-08-22 ENCOUNTER — TELEPHONE (OUTPATIENT)
Dept: ENDOSCOPY | Facility: HOSPITAL | Age: 69
End: 2019-08-22

## 2019-08-23 RX ORDER — HYDROCORTISONE ACETATE 25 MG/1
25 SUPPOSITORY RECTAL 2 TIMES DAILY
Qty: 20 SUPPOSITORY | Refills: 0 | Status: SHIPPED | OUTPATIENT
Start: 2019-08-23 | End: 2019-09-02

## 2019-08-23 RX ORDER — HYDROCORTISONE 25 MG/G
CREAM TOPICAL 2 TIMES DAILY
Qty: 20 G | Refills: 0 | Status: SHIPPED | OUTPATIENT
Start: 2019-08-23 | End: 2019-10-31

## 2019-08-29 RX ORDER — SUCRALFATE 1 G/1
TABLET ORAL
Qty: 120 TABLET | Refills: 4 | Status: SHIPPED | OUTPATIENT
Start: 2019-08-29 | End: 2020-12-07

## 2019-09-12 ENCOUNTER — TELEPHONE (OUTPATIENT)
Dept: NEUROLOGY | Facility: CLINIC | Age: 69
End: 2019-09-12

## 2019-09-12 NOTE — TELEPHONE ENCOUNTER
----- Message from Giovanni Meadows III, MD sent at 9/11/2019  4:40 PM CDT -----  Sure, I'm happy to see him.     [place], please put him on my schedule. Keep in mind that sometimes sneaking him into the resident clinic on a day when I'm proctoring clinic might be faster than waiting for a slot on my own clinic day. Please ask Nicky for help if you need it.   ----- Message -----  From: Valarie Rutherford, CNS  Sent: 9/11/2019   4:24 PM  To: Giovanni Meadows III, MD, [facility] Staff    I am writing on behalf of my spouse who needs to have a FRESH evaluation of his chronic headaches, they have been health issue for most of his life , worsening lately . Been seen quite a while back by different providers including [facility]. He has had other health issues so this one keeps getting pushed back on the list, but to be honest he could use a good neurologist.  I saw your great reviews and want to know if he can get an eval by you in the near future.  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   I appreciate your time and consideration

## 2019-09-16 ENCOUNTER — PATIENT MESSAGE (OUTPATIENT)
Dept: SURGERY | Facility: HOSPITAL | Age: 69
End: 2019-09-16

## 2019-09-18 ENCOUNTER — ANESTHESIA EVENT (OUTPATIENT)
Dept: SURGERY | Facility: HOSPITAL | Age: 69
End: 2019-09-18
Payer: COMMERCIAL

## 2019-09-19 ENCOUNTER — HOSPITAL ENCOUNTER (OUTPATIENT)
Facility: HOSPITAL | Age: 69
Discharge: HOME | End: 2019-09-19
Attending: NEUROLOGICAL SURGERY | Admitting: NEUROLOGICAL SURGERY
Payer: COMMERCIAL

## 2019-09-19 ENCOUNTER — ANESTHESIA (OUTPATIENT)
Dept: SURGERY | Facility: HOSPITAL | Age: 69
End: 2019-09-19
Payer: COMMERCIAL

## 2019-09-19 VITALS
BODY MASS INDEX: 25.76 KG/M2 | TEMPERATURE: 98 F | RESPIRATION RATE: 19 BRPM | HEIGHT: 68 IN | SYSTOLIC BLOOD PRESSURE: 117 MMHG | OXYGEN SATURATION: 99 % | HEART RATE: 59 BPM | DIASTOLIC BLOOD PRESSURE: 68 MMHG | WEIGHT: 170 LBS

## 2019-09-19 DIAGNOSIS — M53.3 SACROILIAC JOINT DYSFUNCTION OF RIGHT SIDE: Primary | ICD-10-CM

## 2019-09-19 DIAGNOSIS — M53.3 SI (SACROILIAC) JOINT DYSFUNCTION: ICD-10-CM

## 2019-09-19 DIAGNOSIS — G89.29 CHRONIC NONINTRACTABLE HEADACHE, UNSPECIFIED HEADACHE TYPE: ICD-10-CM

## 2019-09-19 DIAGNOSIS — R51.9 CHRONIC NONINTRACTABLE HEADACHE, UNSPECIFIED HEADACHE TYPE: ICD-10-CM

## 2019-09-19 PROCEDURE — 27096 PR INJECTION,SACROILIAC JOINT: ICD-10-PCS | Mod: RT,,, | Performed by: NEUROLOGICAL SURGERY

## 2019-09-19 PROCEDURE — 37000009 HC ANESTHESIA EA ADD 15 MINS: Performed by: NEUROLOGICAL SURGERY

## 2019-09-19 PROCEDURE — 71000016 HC POSTOP RECOV ADDL HR: Performed by: NEUROLOGICAL SURGERY

## 2019-09-19 PROCEDURE — 63600175 PHARM REV CODE 636 W HCPCS: Performed by: NEUROLOGICAL SURGERY

## 2019-09-19 PROCEDURE — 37000008 HC ANESTHESIA 1ST 15 MINUTES: Performed by: NEUROLOGICAL SURGERY

## 2019-09-19 PROCEDURE — 25000003 PHARM REV CODE 250: Performed by: NEUROLOGICAL SURGERY

## 2019-09-19 PROCEDURE — 63600175 PHARM REV CODE 636 W HCPCS: Performed by: NURSE ANESTHETIST, CERTIFIED REGISTERED

## 2019-09-19 PROCEDURE — 71000015 HC POSTOP RECOV 1ST HR: Performed by: NEUROLOGICAL SURGERY

## 2019-09-19 PROCEDURE — 27096 INJECT SACROILIAC JOINT: CPT | Mod: RT,,, | Performed by: NEUROLOGICAL SURGERY

## 2019-09-19 PROCEDURE — 36000704 HC OR TIME LEV I 1ST 15 MIN: Performed by: NEUROLOGICAL SURGERY

## 2019-09-19 PROCEDURE — S0020 INJECTION, BUPIVICAINE HYDRO: HCPCS | Performed by: NEUROLOGICAL SURGERY

## 2019-09-19 PROCEDURE — 36000705 HC OR TIME LEV I EA ADD 15 MIN: Performed by: NEUROLOGICAL SURGERY

## 2019-09-19 RX ORDER — PROPOFOL 10 MG/ML
VIAL (ML) INTRAVENOUS CONTINUOUS PRN
Status: DISCONTINUED | OUTPATIENT
Start: 2019-09-19 | End: 2019-09-19

## 2019-09-19 RX ORDER — BUPIVACAINE HYDROCHLORIDE 5 MG/ML
INJECTION, SOLUTION EPIDURAL; INTRACAUDAL
Status: DISCONTINUED | OUTPATIENT
Start: 2019-09-19 | End: 2019-09-19 | Stop reason: HOSPADM

## 2019-09-19 RX ORDER — SODIUM CHLORIDE, SODIUM LACTATE, POTASSIUM CHLORIDE, CALCIUM CHLORIDE 600; 310; 30; 20 MG/100ML; MG/100ML; MG/100ML; MG/100ML
INJECTION, SOLUTION INTRAVENOUS CONTINUOUS PRN
Status: DISCONTINUED | OUTPATIENT
Start: 2019-09-19 | End: 2019-09-19

## 2019-09-19 RX ORDER — HYDROCODONE BITARTRATE AND ACETAMINOPHEN 5; 325 MG/1; MG/1
1 TABLET ORAL EVERY 8 HOURS PRN
Qty: 21 TABLET | Refills: 0 | Status: SHIPPED | OUTPATIENT
Start: 2019-09-19 | End: 2020-05-01 | Stop reason: SDUPTHER

## 2019-09-19 RX ORDER — HYDROCODONE BITARTRATE AND ACETAMINOPHEN 5; 325 MG/1; MG/1
1 TABLET ORAL EVERY 8 HOURS PRN
Qty: 21 TABLET | Refills: 0 | Status: SHIPPED | OUTPATIENT
Start: 2019-09-19 | End: 2019-09-19 | Stop reason: SDUPTHER

## 2019-09-19 RX ORDER — LIDOCAINE HYDROCHLORIDE 10 MG/ML
INJECTION INFILTRATION; PERINEURAL
Status: DISCONTINUED | OUTPATIENT
Start: 2019-09-19 | End: 2019-09-19 | Stop reason: HOSPADM

## 2019-09-19 RX ORDER — METHYLPREDNISOLONE ACETATE 40 MG/ML
INJECTION, SUSPENSION INTRA-ARTICULAR; INTRALESIONAL; INTRAMUSCULAR; SOFT TISSUE
Status: DISCONTINUED | OUTPATIENT
Start: 2019-09-19 | End: 2019-09-19 | Stop reason: HOSPADM

## 2019-09-19 RX ORDER — LIDOCAINE HCL/PF 100 MG/5ML
SYRINGE (ML) INTRAVENOUS
Status: DISCONTINUED | OUTPATIENT
Start: 2019-09-19 | End: 2019-09-19

## 2019-09-19 RX ORDER — PROPOFOL 10 MG/ML
VIAL (ML) INTRAVENOUS
Status: DISCONTINUED | OUTPATIENT
Start: 2019-09-19 | End: 2019-09-19

## 2019-09-19 RX ADMIN — PROPOFOL 125 MCG/KG/MIN: 10 INJECTION, EMULSION INTRAVENOUS at 07:09

## 2019-09-19 RX ADMIN — SODIUM CHLORIDE, SODIUM LACTATE, POTASSIUM CHLORIDE, AND CALCIUM CHLORIDE: .6; .31; .03; .02 INJECTION, SOLUTION INTRAVENOUS at 06:09

## 2019-09-19 RX ADMIN — LIDOCAINE HYDROCHLORIDE 100 MG: 20 INJECTION, SOLUTION INTRAVENOUS at 07:09

## 2019-09-19 RX ADMIN — PROPOFOL 50 MG: 10 INJECTION, EMULSION INTRAVENOUS at 07:09

## 2019-09-19 NOTE — DISCHARGE INSTRUCTIONS
Please complete pain diary and bring to clinic follow-up  Home Care Instructions Pain Management:    1.  DIET:    You may resume your normal diet today.    2.  BATHING:    You may shower with luke warm water.    3.  DRESSING:    You may remove your bandage today.    4.  ACTIVITY LEVEL:      You may resume your normal activities 24 hours after your procedure.    5.  MEDICATIONS:    You may resume your normal medications today.    6.  SPECIAL INSTRUCTIONS:    No heat to the injection site for 24 hours including bath or shower, heating pad, moist heat or hot tubs.    Use an ice pack to the injection site for any pain or discomfort.  Apply ice packs for 20 minute intervals as needed.    If you have received any sedatives by mouth today, you can not drive for 12 hours.    If you have received sedation through an IV, you can not drive for 24 hours.    PLEASE CALL YOUR DOCTOR FOR THE FOLLOWIN.  Redness or swelling around the injection site.  2.  Fever of 101 degrees.  3.  Drainage (pus) from the injection site.  4.  For any continuous bleeding (some dried blood over the incision is normal.)    FOR EMERGENCIES:    If any unusual problems or difficulties occur during clinic hours, call [phone] or dial [phone].    Follow up with with your physician in 2-3 weeks.     ANESTHESIA  -For the first 24 hours after surgery:  Do not drive, use heavy equipment, make important decisions, or drink alcohol  -It is normal to feel sleepy for several hours.  Rest until you are more awake.  -Have someone stay with you, if needed.  They can watch for problems and help keep you safe.  -Some possible post anesthesia side effects include: nausea and vomiting, sore throat and hoarseness, sleepiness, and dizziness.    PAIN  -If you have pain after surgery, pain medicine will help you feel better.  Take it as directed, before pain becomes severe.  Most pain relievers taken by mouth need at least 20-30 minutes to start working.  -Do not  drive or drink alcohol while taking pain medicine.  -Pain medication can upset your stomach.  Taking them with a little food may help.  -Other ways to help control pain: elevation, ice, and relaxation  -Call your surgeon if still having unmanageable pain an hour after taking pain medicine.  -Pain medicine can cause constipation.  Taking an over-the counter stool softener while on prescription pain medicine and drinking plenty of fluids can prevent this side effect.  -Call your surgeon if you have severe side effects like: breathing problems, trouble waking up, dizziness, confusion, or severe constipation.    NAUSEA  -Some people have nausea after surgery.  This is often because of anesthesia, pain, pain medicine, or the stress of surgery.  -Do not push yourself to eat.  Start off with clear liquids and soup.  Slowly move to solid foods.  Don't eat fatty, rich, spicy foods at first.  Eat smaller amounts.  -If you develop persistent nausea and vomiting please notify your surgeon immediately.    BLEEDING  -Different types of surgery require different types of care and dressing changes.  It is important to follow all instructions and advice from your surgeon.  Change dressing as directed.  Call your surgeon for any concerns regarding postop bleeding.    SIGNS OF INFECTION  -Signs of infection include: fever, swelling, drainage, and redness  -Notify your surgeon if you have a fever of 100.4 F (38.0 C) or higher.  -Notify your surgeon if you notice redness, swelling, increased pain, pus, or a foul smell at the incision site.    Acetaminophen; Hydrocodone tablets or capsules  What is this medicine?  ACETAMINOPHEN; HYDROCODONE (a set a JUN shannen fen; shauna droe KOE done) is a pain reliever. It is used to treat moderate to severe pain.  How should I use this medicine?  Take this medicine by mouth with a glass of water. Follow the directions on the prescription label. You can take it with or without food. If it upsets your  stomach, take it with food. Do not take your medicine more often than directed.  A special MedGuide will be given to you by the pharmacist with each prescription and refill. Be sure to read this information carefully each time.  Talk to your pediatrician regarding the use of this medicine in children. Special care may be needed.  What side effects may I notice from receiving this medicine?  Side effects that you should report to your doctor or health care professional as soon as possible:  · allergic reactions like skin rash, itching or hives, swelling of the face, lips, or tongue  · breathing problems  · confusion  · redness, blistering, peeling or loosening of the skin, including inside the mouth  · signs and symptoms of low blood pressure like dizziness; feeling faint or lightheaded, falls; unusually weak or tired  · trouble passing urine or change in the amount of urine  · yellowing of the eyes or skin  Side effects that usually do not require medical attention (report to your doctor or health care professional if they continue or are bothersome):  · constipation  · dry mouth  · nausea, vomiting  · tiredness  What may interact with this medicine?  This medicine may interact with the following medications:  · alcohol  · antiviral medicines for HIV or AIDS  · atropine  · antihistamines for allergy, cough and cold  · certain antibiotics like erythromycin, clarithromycin  · certain medicines for anxiety or sleep  · certain medicines for bladder problems like oxybutynin, tolterodine  · certain medicines for depression like amitriptyline, fluoxetine, sertraline  · certain medicines for fungal infections like ketoconazole and itraconazole  · certain medicines for Parkinson's disease like benztropine, trihexyphenidyl  · certain medicines for seizures like carbamazepine, phenobarbital, phenytoin, primidone  · certain medicines for stomach problems like dicyclomine, hyoscyamine  · certain medicines for travel sickness  like scopolamine  · general anesthetics like halothane, isoflurane, methoxyflurane, propofol  · ipratropium  · local anesthetics like lidocaine, pramoxine, tetracaine  · MAOIs like Carbex, Eldepryl, Marplan, Nardil, and Parnate  · medicines that relax muscles for surgery  · other medicines with acetaminophen  · other narcotic medicines for pain or cough  · phenothiazines like chlorpromazine, mesoridazine, prochlorperazine, thioridazine  · rifampin  What if I miss a dose?  If you miss a dose, take it as soon as you can. If it is almost time for your next dose, take only that dose. Do not take double or extra doses.  Where should I keep my medicine?  Keep out of the reach of children. This medicine can be abused. Keep your medicine in a safe place to protect it from theft. Do not share this medicine with anyone. Selling or giving away this medicine is dangerous and against the law.  This medicine may cause accidental overdose and death if it taken by other adults, children, or pets. Mix any unused medicine with a substance like cat litter or coffee grounds. Then throw the medicine away in a sealed container like a sealed bag or a coffee can with a lid. Do not use the medicine after the expiration date.  Store at room temperature between 15 and 30 degrees C (59 and 86 degrees F).  What should I tell my health care provider before I take this medicine?  They need to know if you have any of these conditions:  · brain tumor  · Crohn's disease, inflammatory bowel disease, or ulcerative colitis  · drug abuse or addiction  · head injury  · heart or circulation problems  · if you often drink alcohol  · kidney disease or problems going to the bathroom  · liver disease  · lung disease, asthma, or breathing problems  · an unusual or allergic reaction to acetaminophen, hydrocodone, other opioid analgesics, other medicines, foods, dyes, or preservatives  · pregnant or trying to get pregnant  · breast-feeding  What should I watch  for while using this medicine?  Tell your doctor or health care professional if your pain does not go away, if it gets worse, or if you have new or a different type of pain. You may develop tolerance to the medicine. Tolerance means that you will need a higher dose of the medicine for pain relief. Tolerance is normal and is expected if you take the medicine for a long time.  Do not suddenly stop taking your medicine because you may develop a severe reaction. Your body becomes used to the medicine. This does NOT mean you are addicted. Addiction is a behavior related to getting and using a drug for a non-medical reason. If you have pain, you have a medical reason to take pain medicine. Your doctor will tell you how much medicine to take. If your doctor wants you to stop the medicine, the dose will be slowly lowered over time to avoid any side effects.  There are different types of narcotic medicines (opiates). If you take more than one type at the same time or if you are taking another medicine that also causes drowsiness, you may have more side effects. Give your health care provider a list of all medicines you use. Your doctor will tell you how much medicine to take. Do not take more medicine than directed. Call emergency for help if you have problems breathing or unusual sleepiness.  Do not take other medicines that contain acetaminophen with this medicine. Always read labels carefully. If you have questions, ask your doctor or pharmacist.  If you take too much acetaminophen get medical help right away. Too much acetaminophen can be very dangerous and cause liver damage. Even if you do not have symptoms, it is important to get help right away.  You may get drowsy or dizzy. Do not drive, use machinery, or do anything that needs mental alertness until you know how this medicine affects you. Do not stand or sit up quickly, especially if you are an older patient. This reduces the risk of dizzy or fainting spells.  Alcohol may interfere with the effect of this medicine. Avoid alcoholic drinks.  The medicine will cause constipation. Try to have a bowel movement at least every 2 to 3 days. If you do not have a bowel movement for 3 days, call your doctor or health care professional.  Your mouth may get dry. Chewing sugarless gum or sucking hard candy, and drinking plenty of water may help. Contact your doctor if the problem does not go away or is severe.  NOTE:This sheet is a summary. It may not cover all possible information. If you have questions about this medicine, talk to your doctor, pharmacist, or health care provider. Copyright© 2017 [employer]

## 2019-09-19 NOTE — ANESTHESIA PREPROCEDURE EVALUATION
09/19/2019     Raffy Rutherford Jr. is a 69 y.o., male here for SI joint Injection    Pre-op Assessment     I have reviewed the Nursing Notes.   I have reviewed the Medications.     Review of Systems  Anesthesia Hx:  No problems with previous Anesthesia Denies Hx of Anesthetic complications  Denies Family Hx of Anesthesia complications.    Social:  Non-Smoker, No Alcohol Use    Hematology/Oncology:  Hematology Normal   Oncology Normal     EENT/Dental:EENT/Dental Normal   Cardiovascular:  Cardiovascular Normal Exercise tolerance: good     Pulmonary:  Pulmonary Normal    Renal/:  Renal/ Normal     Hepatic/GI:  Hepatic/GI Normal    Musculoskeletal:  Musculoskeletal Normal    Neurological:  Neurology Normal    Endocrine:  Endocrine Normal        Physical Exam  General:  Well nourished    Airway/Jaw/Neck:  Airway Findings: Mouth Opening: Normal Tongue: Normal  General Airway Assessment: Adult  Mallampati: II  TM Distance: Normal, at least 6 cm  Jaw/Neck Findings:     Neck ROM: Normal ROM      Dental:  Dental Findings: In tact        Mental Status:  Mental Status Findings:  Cooperative, Alert and Oriented         Anesthesia Plan  Type of Anesthesia, risks & benefits discussed:  Anesthesia Type:  MAC  Patient's Preference: MAC  Intra-op Monitoring Plan:   Intra-op Monitoring Plan Comments:   Post Op Pain Control Plan:   Post Op Pain Control Plan Comments:   Induction:   IV  Beta Blocker:         Informed Consent: Patient understands risks and agrees with Anesthesia plan.  Questions answered. Anesthesia consent signed with patient.  ASA Score: 2     Day of Surgery Review of History & Physical:            Ready For Surgery From Anesthesia Perspective.

## 2019-09-19 NOTE — ANESTHESIA POSTPROCEDURE EVALUATION
Anesthesia Post Evaluation    Patient: Raffy Rutherford Jr.    Procedure(s) Performed: Procedure(s) (LRB):  INJECTION, STEROID Procedure: Right SI joint block nd steroid injection (Right)    Final Anesthesia Type: MAC  Patient location during evaluation: [facility]  Patient participation: Yes- Able to Participate  Level of consciousness: awake and alert  Post-procedure vital signs: reviewed and stable  Pain management: adequate  Airway patency: patent  PONV status at discharge: No PONV  Anesthetic complications: no      Cardiovascular status: hemodynamically stable  Respiratory status: unassisted, spontaneous ventilation and room air  Hydration status: euvolemic  Follow-up not needed.          Vitals Value Taken Time   /64 9/19/2019  6:26 AM   Temp 36.8 °C (98.2 °F) 9/19/2019  6:25 AM   Pulse 54 9/19/2019  6:25 AM   Resp 16 9/19/2019  6:25 AM   SpO2 96 % 9/19/2019  6:25 AM         No case tracking events are documented in the log.      Pain/Tequila Score: No data recorded

## 2019-09-19 NOTE — OP NOTE
DATE OF PROCEDURE: 9/19/2019     PREOPERATIVE DIAGNOSES:  1. 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 Right sacroiliac joint block and steroid injection  2. Fluoroscopy     PRIMARY SURGEON: Jason Caldwell M.D.     ASSISTANT SURGEON: DEANN        INDICATION FOR PROCEDURE: This is a 68-year-old male, s/p L3-5 fusion, who presented with right SI joint pain that was refractory to conservative treatment. The pain irradiates in the anterior thigh and buttock. With walking the pain can increase to 8/10 on average. Risks, benefits, alternative and limitation were discussed with the patient. The risk included nerve root injury that can cause paralysis, cerebrospinal fluid leak, wound infection and blood loss. The patient wanted to proceed and a consent was obtained.      DESCRIPTION OF THE PROCEDURE     The patient was placed on MAC anesthesia and was prone on the Favian table.  All pressure points were carefully padded. The patient was prepped and draped   in the typical sterile fashion and the incision was made with a #15 blade.   Hemostasis was complete and the gluteal fascia was penetrated with a k-wire. Using the lateral and outlet views, and after local anesthesia with 1% lidocaine, the 22 spinal diego needle was inserted inside the right sacroiliac joint and 1cc of Omnipaque was injected to confirm the needle tip placement.  We then injected a mixture of 1cc of 40mg of Depo-Medrol and 3 cc of 0.5% marcaine. The wounds were dressed with a sterile dressing. The blood loss was less than 1 cc. The final count was completed and nothing was missing. There was no complication.

## 2019-09-19 NOTE — TRANSFER OF CARE
"Anesthesia Transfer of Care Note    Patient: Raffy Rutherford Jr.    Procedure(s) Performed: Procedure(s) (LRB):  INJECTION, STEROID Procedure: Right SI joint block nd steroid injection (Right)    Patient location: [facility]    Anesthesia Type: MAC    Transport from OR: Transported from OR on room air with adequate spontaneous ventilation    Post pain: adequate analgesia    Post assessment: no apparent anesthetic complications and tolerated procedure well    Post vital signs: stable    Level of consciousness: awake and alert    Nausea/Vomiting: no nausea/vomiting    Complications: none    Transfer of care protocol was followed      Last vitals:   Visit Vitals  /64   Pulse (!) 54   Temp 36.8 °C (98.2 °F)   Resp 16   Ht 5' 8" (1.727 m)   Wt 77.1 kg (170 lb)   SpO2 96%   BMI 25.85 kg/m²     "

## 2019-09-19 NOTE — H&P
NEUROSURGICAL PROGRESS NOTE   DATE OF SERVICE:   09/19/2019   ATTENDING PHYSICIAN:   Jason Caldwell MD   SUBJECTIVE:   INTERIM HISTORY:   This is a very pleasant 69 y.o. male, who is status post L3-5 fusion, bilateral peroneal neuropathy and foot drop. S-P left hip replacement. Recent left trochanteric bursa steroid injection. He is s/p right SI joint block and steroid injection in December 2018 which gave him 5 months of significant pain relief. He had more than 75% pain relief for the duration of the block. He has been doing SI joint physical therapy and pull therapy with significant improvement in his SI joint pain. He has stopped doing pool therapy recently after his insurance. His right SI joint pain has become severe again. Pain is triggered by walking, climbing up stairs, sitting for prolonged period of time. He wants to resume post therapy. He is wearing his SI joint belt with some pain relief. No new weakness or numbness.   Low Back Pain Scale   R Low Back-Pain Score: 7   R Low Back-Pain Intensity: Pain killers give moderate relief from pain   R Low Back-Pain Score: I need some help, but manage most of my personal care   Low Back-Lifting: I can only lift very light weights   Low Back-Walking: Pain prevents me walking more than .5 mile   Low Back-Sitting: Pain prevents me from sitting more than 1 hour   Low Back-Standing: I cannot stand for longer than 30 mins without increasing pain   Low Back-Sleeping: I have pain in bed but it does not prevent me from sleeping well   Low Back-Social Life: Pain has no significant effect on my social life apart from limiting my more en   Low Back-Traveling: I have extra pain while traveling but it does not compel me to seek alternate forms of travel   Low Back-Changing Degree of Pain: My pain seems to be getting better but improvement is slow     PAST MEDICAL HISTORY:        Active Ambulatory Problems    Diagnosis Date Noted    Depression     Hyperlipidemia     Chronic  pain     Colon polyp     Adenomatous polyp     History of prostate biopsy     GERD (gastroesophageal reflux disease)     Back pain     Sleep apnea     Visual disturbances 10/03/2012    Spondylosis without myelopathy 11/09/2012    Degeneration of lumbar or lumbosacral intervertebral disc 11/09/2012    Spinal stenosis, lumbar region, without neurogenic claudication 11/09/2012    Thoracic or lumbosacral neuritis or radiculitis, unspecified 11/09/2012    Displacement of lumbar intervertebral disc without myelopathy 11/09/2012    Acquired spondylolisthesis 11/09/2012    Lumbago 11/09/2012    Status post cataract extraction and insertion of intraocular lens - Both Eyes 11/13/2012    Prostatitis, acute 12/17/2012    Fibromyalgia 10/21/2013    OP (osteoporosis) 10/21/2013    Compression fracture of T12 vertebra 10/21/2013    Diverticulitis large intestine 12/21/2013    Osteoarthritis 03/19/2014    Lower back pain 04/01/2014    Lower extremity pain 04/01/2014    Decreased strength 04/01/2014    Range of motion deficit 04/01/2014    Special screening for malignant neoplasms, colon 05/05/2014    Cervicalgia 06/19/2014    Facet joint disease of cervical region 06/19/2014    Occipital neuralgia 06/19/2014    Chronic migraine without aura, with intractable migraine, so stated, with status migrainosus 06/19/2014    Cervical radiculopathy 06/19/2014    Paroxysmal hemicrania 06/19/2014    Sciatic nerve pain 06/19/2014    Chronic LBP 06/27/2014    DJD (degenerative joint disease) of lumbar spine 06/27/2014    Chronic neck pain 06/27/2014    Right lumbar radiculopathy 06/27/2014    Thyroid nodule 07/16/2014    Carpal tunnel syndrome of right wrist 07/16/2014    Paresthesia 08/01/2014    Brow ptosis 02/10/2015    Degenerative disc disease 06/22/2015    Encounter for postoperative wound check 08/01/2015    Lumbar radiculopathy 09/15/2015    Cervical spondylosis 10/22/2015    Low back pain  without sciatica 10/27/2015    Lumbar radicular pain 10/27/2015    S/P lumbar spinal fusion 12/02/2015    Gait abnormality 02/21/2017    Impaired functional mobility, balance, gait, and endurance 02/24/2017    Left hip pain 05/24/2017    Right wrist tendinitis 07/28/2017    Pain in right wrist 07/28/2017    Difficulty walking 11/01/2017    Weakness 11/01/2017    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    Headache around the eyes 06/26/2018    Lumbar back pain with radiculopathy affecting lower extremity 11/15/2018    Closed fracture of left distal radius 02/05/2019    Pain in left wrist 03/19/2019          Resolved Ambulatory Problems    Diagnosis Date Noted    Blepharitis of both eyes - Both Eyes 11/13/2012          Past Medical History:   Diagnosis Date    Adenomatous polyp 2003    Adenomatous polyp     Allergy     Arthritis     Back pain     Cataract     Chronic pain     Cluster headache 2013    Colon polyp     Degenerative disc disease     Depression     Diverticulitis 12/2013    Elevated PSA     Fibromyalgia 2013    GERD (gastroesophageal reflux disease)     Hepatitis 1970's    History of prostate biopsy 2002    Hyperlipidemia     Joint pain     Sleep apnea     Special screening for malignant neoplasms, colon 5/5/2014    Thyroid nodule 7/16/2014    Visual disturbance 2012     PAST SURGICAL HISTORY:         Past Surgical History:   Procedure Laterality Date    BACK SURGERY      BLEPHAROPLASTY UPPER LID Bilateral 2/10/2015    Performed by Rhett Lainez III, [facility] at [facility] OR 2ND FLR    BLOCK, GANGLION IMPAR N/A 6/25/2013    [facility]eli [facility] at [facility] PA[facility]    CATARACT EXTRACTION W/ INTRAOCULAR LENS IMPLANT Bilateral     CHOLECYSTECTOMY      COLONOSCOPY N/A 5/5/2014    Performed by Pedro Carlos MD at [facility] ENDO (4TH FLR)    COSMETIC SURGERY  2/10/2015    Direct mid-forehead brow lift    COSMETIC SURGERY  2/10/2015     Bilateral upper lid blepahroplasty    ESOPHAGOGASTRODUODENOSCOPY (EGD) N/A 4/28/2015    Performed by Amadou Hardin MD at [facility] ENDO (4TH FLR)    EYE SURGERY      FUSION EXTREME LATERAL N/A 6/22/2015    Performed by Milad Browne MD at [facility] OR 2ND FLR    HEMORRHOID SURGERY      with complication of chronic bleeding for 6 weeks     INJECTION, STEROID Right SI Joint Block and Steroid Injection Right 12/6/2018    Performed [facility] OR    JOINT REPLACEMENT      KNEE SURGERY      involving arthroscopic surgery to both knees    LIFT-BROW midforehead direct Bilateral 2/10/2015    Performed by Rhett Lainez III, MD at [facility] OR 2ND FLR    SINUS SURGERY      SINUS SURGERY      left molar and sinus surgery for trigeminal neuralgia    SPINAL FUSION  6/22/2015    L3-L5 XLIF    SPINE SURGERY  6/22/15    XLIF/TANA    TOTAL HIP ARTHROPLASTY  4/2012    Pt states he had total hip replacement on his left hip.     SOCIAL HISTORY:   Social History                                                                                                                                                                                                                                                               FAMILY HISTORY:         Family History   Problem Relation Age of Onset    Cancer Mother     colon; uterine    Nephrolithiasis Mother     Stroke Mother 86    Pancreatitis Brother     Vision loss Brother     macular degeneration    Diabetes Brother     Macular degeneration Brother     Migraines Sister     No Known Problems Father     No Known Problems Maternal Grandmother     No Known Problems Maternal Grandfather     No Known Problems Paternal Grandmother     No Known Problems Paternal Grandfather     No Known Problems Sister     No Known Problems Maternal Aunt     No Known Problems Maternal Uncle     No Known Problems Paternal Aunt     No Known Problems Paternal Uncle     Melanoma  Neg Hx     Amblyopia Neg Hx     Blindness Neg Hx     Cataracts Neg Hx     Glaucoma Neg Hx     Hypertension Neg Hx     Retinal detachment Neg Hx     Strabismus Neg Hx     Thyroid disease Neg Hx     Allergic rhinitis Neg Hx     Allergies Neg Hx     Angioedema Neg Hx     Asthma Neg Hx     Eczema Neg Hx     Urticaria Neg Hx     Rhinitis Neg Hx     Immunodeficiency Neg Hx     Atopy Neg Hx      CURRENTS MEDICATIONS:          Current Outpatient Medications on File Prior to Visit   Medication Sig Dispense Refill    clonazePAM (KLONOPIN) 0.5 MG tablet take 2 tablets by mouth every night at bedtime 60 tablet 5    diclofenac sodium (VOLTAREN) 1 % Gel Apply 2 g topically once daily. 100 g 0    HYDROcodone-acetaminophen (NORCO) 5-325 mg per tablet Take 1 tablet by mouth every 8 (eight) hours as needed for Pain (moderate to severe). 21 tablet 0    lamoTRIgine (LAMICTAL) 200 MG tablet Take one tablet by mouth once daily 90 tablet 3    lithium (ESKALITH) 300 MG capsule Take 150 mg by mouth 2 (two) times daily.      lithium (LITHOTAB) 300 mg tablet Take 1 tablet by mouth at bedtime 30 tablet 11    lithium carbonate 150 MG capsule Take 1 capsule (150 mg total) by mouth 2 (two) times daily. 60 capsule 5    pravastatin (PRAVACHOL) 40 MG tablet Take 1 tablet (40 mg total) by mouth once daily. 90 tablet 3    RABEprazole (ACIPHEX) 20 mg tablet Take 1 tablet (20 mg total) by mouth 2 (two) times daily. 180 tablet 3    selegiline (EMSAM) 12 mg/24 hr Place 1 patch onto the skin once daily. 90 patch 3    selegiline (EMSAM) 12 mg/24 hr Place onto the skin once daily. 90 patch 3    senna-docusate 8.6-50 mg (PERICOLACE) 8.6-50 mg per tablet Take 2 tablets by mouth nightly as needed for Constipation.      sucralfate (CARAFATE) 1 gram tablet as needed.       traZODone (DESYREL) 100 MG tablet Take one tablet by mouth every night at bedtime 90 tablet 3    VOLTAREN 1 % Gel       azithromycin (Z-UZAIR) 250 MG tablet Take  2 tablets by mouth on day 1, then day 2-5 take 1 tablet daily 6 tablet 0    methylPREDNISolone (MEDROL DOSEPACK) 4 mg tablet use as directed 21 tablet 0     No current facility-administered medications on file prior to visit.      ALLERGIES:         Review of patient's allergies indicates:   Allergen Reactions    Alphagan [brimonidine]      Patient taking MASO-B Selective Inhibitor Selegiline (Emsam)    Coumadin [warfarin]      itch    Oxycodone      hiccups    Pennsaid [diclofenac sodium]      REVIEW OF SYSTEMS:   Review of Systems   Constitutional: Negative for diaphoresis, fever and weight loss.   Respiratory: Negative for shortness of breath.   Cardiovascular: Negative for chest pain.   Gastrointestinal: Negative for blood in stool.   Genitourinary: Negative for hematuria.   Endo/Heme/Allergies: Does not bruise/bleed easily.   All other systems reviewed and are negative.     OBJECTIVE:   PHYSICAL EXAMINATION:       Vitals:    07/16/19 0953   BP: 119/77   Pulse: (!) 53   Temp: 98.2 °F (36.8 °C)     Physical Exam:   Vitals reviewed.   Constitutional: He appears well-developed and well-nourished.   Eyes: Pupils are equal, round, and reactive to light. Conjunctivae and EOM are normal.   Cardiovascular: Normal distal pulses and no edema.   Abdominal: Soft.   Skin: Skin displays no rash on trunk and no rash on extremities. Skin displays no lesions on trunk and no lesions on extremities.     Psych/Behavior: He is alert. He is oriented to person, place, and time. He has a normal mood and affect.     Musculoskeletal:   Neck: Range of motion is full.     Neurological:   DTRs: Tricep reflexes are 2+ on the right side and 2+ on the left side. Bicep reflexes are 2+ on the right side and 2+ on the left side. Brachioradialis reflexes are 2+ on the right side and 2+ on the left side. Patellar reflexes are 2+ on the right side and 2+ on the left side. Achilles reflexes are 0 on the right side and 0 on the left side.      Back Exam   Tenderness   The patient is experiencing tenderness in the sacroiliac (Right-sided).   Range of Motion   Extension: abnormal   Flexion: abnormal   Lateral bend right: normal   Lateral bend left: normal   Rotation right: normal   Rotation left: normal   Muscle Strength   Right Quadriceps: 5/5   Left Quadriceps: 5/5   Right Hamstrings: 5/5   Left Hamstrings: 5/5   Tests   Straight leg raise right: negative   Straight leg raise left: negative   Other   Toe walk: normal   Heel walk: normal     SI joint:   Palpation at the right is painful   ABDIEL test is positive on the right side   Gaenslen test is positive on the right side   Thigh thrust test is positive on the right side   Neurologic Exam   Mental Status   Oriented to person, place, and time.   Speech: speech is normal   Level of consciousness: alert   Cranial Nerves   Cranial nerves II through XII intact.   CN III, IV, VI   Pupils are equal, round, and reactive to light.  Extraocular motions are normal.   Motor Exam   Muscle bulk: normal  Overall muscle tone: normal   Strength   Right deltoid: 5/5   Left deltoid: 5/5   Right biceps: 5/5   Left biceps: 5/5   Right triceps: 5/5   Left triceps: 5/5   Right wrist flexion: 5/5   Left wrist flexion: 5/5   Right wrist extension: 5/5   Left wrist extension: 5/5   Right interossei: 5/5   Left interossei: 5/5   Right iliopsoas: 5/5   Left iliopsoas: 5/5   Right quadriceps: 5/5   Left quadriceps: 5/5   Right hamstrin/5   Left hamstrin/5   Right anterior tibial: 4/5   Left anterior tibial: 4/5   Right posterior tibial: 4/5   Left posterior tibial: 4/5   Right peroneal: 4/5   Left peroneal: 4/5   Right gastroc: 4/5   Left gastroc: 4/5   Sensory Exam   Light touch normal.   Pinprick normal.   Gait, Coordination, and Reflexes   Reflexes   Right brachioradialis: 2+  Left brachioradialis: 2+  Right biceps: 2+  Left biceps: 2+  Right triceps: 2+  Left triceps: 2+  Right patellar: 2+  Left patellar: 2+  Right  achilles: 0  Left achilles: 0  Right plantar: normal  Left plantar: normal  Right De Leon: absent  Left De Leon: absent  Right ankle clonus: absent  Left ankle clonus: absent     DIAGNOSTIC DATA:   I personally interpreted the following imaging:   Lumbar spine MRI 2018 shows L3-L5 fusion, no significant facet arthropathy at L5-S1   ASSESMENT:   This is a 69 y.o. male with       Problem List Items Addressed This Visit       None                  Visit Diagnoses       Bilateral foot-drop - Primary    Relevant Orders    Ambulatory consult to Neurology    Idiopathic peripheral neuropathy     Relevant Orders    Ambulatory consult to Neurology    Sacroiliac joint dysfunction of right side           PLAN:     Will repeat right SI joint block and steroid injection   Continue SI joint physical therapy/pool therapy     Jason Caldwell MD   Cell:[phone]

## 2019-09-19 NOTE — DISCHARGE SUMMARY
[facility]  Neurosurgery  Discharge Summary      Patient Name: Raffy Rutherford Jr.  MRN: [id number]  Admission Date: 9/19/2019  Hospital Length of Stay: 0 days  Discharge Date and Time:  09/19/2019 9:40 AM  Attending Physician: Jason Caldwell MD   Discharging Provider: Juliocesar Parker PA-C  Primary Care Provider: Nini Rendon MD    HPI:   Raffy Rutherford Jr. presented for R Si joint steroid block injection. he tolerated the procedure well, and there were no intra-operative difficultiesPatient was able to void on his/her own.  Post operative x-rays showed good alignment of hardware. On 09/19/2019, patient was discharged home with pain medication and follow up appointments. Regular diet. Activity as tolerated. At the time of discharge, vital signs were stable, patient was afebrile and neurologically stable.  he  was counseled on wound care, activity restrictions, and follow up prior to discharge.  Discharge instructions were given verbally/written to the patient and their family. All of their questions were answered. Patient and family voiced understanding. They were encouraged to call the clinic with any questions they might have prior to the follow up appointments.       Pending Diagnostic Studies:     None        Final Active Diagnoses:    Diagnosis Date Noted POA    PRINCIPAL PROBLEM:  SI (sacroiliac) joint dysfunction [M53.3] 09/19/2019 Yes      Problems Resolved During this Admission:      Discharged Condition: good    Disposition: home     Follow Up: 2 week neurosurgery clinic    Patient Instructions:      Diet general     Medications:  Reconciled Home Medications:      Medication List      CHANGE how you take these medications    * HYDROcodone-acetaminophen 7.5-325 mg per tablet  Commonly known as:  NORCO  take 1 tablet by mouth every 8 hours as needed for pain  What changed:  Another medication with the same name was added. Make sure you understand how and when to take each.         * This  list has 1 medication(s) that are the same as other medications prescribed for you. Read the directions carefully, and ask your doctor or other care provider to review them with you.            CONTINUE taking these medications    clonazePAM 0.5 MG tablet  Commonly known as:  KLONOPIN  Take up to 2 tablets by mouth every night at bedtime     hydrocortisone 2.5 % cream  Apply topically 2 (two) times daily. for 10 days     lamoTRIgine 200 MG tablet  Commonly known as:  LAMICTAL  take 1 tablet by mouth every day     pravastatin 40 MG tablet  Commonly known as:  PRAVACHOL  Take 1 tablet (40 mg total) by mouth once daily.     RABEprazole 20 mg tablet  Commonly known as:  ACIPHEX  Take 1 tablet (20 mg total) by mouth 2 (two) times daily.     * selegiline 12 mg/24 hr  Commonly known as:  EMSAM  Place 1 patch onto the skin once daily.     * EMSAM 12 mg/24 hr  Generic drug:  selegiline  Place onto the skin once daily.     * selegiline 12 mg/24 hr  Commonly known as:  EMSAM  Place onto the skin once daily     senna-docusate 8.6-50 mg 8.6-50 mg per tablet  Commonly known as:  PERICOLACE  Take 2 tablets by mouth nightly as needed for Constipation.     sucralfate 1 gram tablet  Commonly known as:  CARAFATE  TAKE 1 TABLET BY MOUTH 4 TIMES A DAY     traZODone 100 MG tablet  Commonly known as:  DESYREL  take 1 tablet by mouth every night at bedtime     * VOLTAREN 1 % Gel  Generic drug:  diclofenac sodium     * diclofenac sodium 1 % Gel  Commonly known as:  VOLTAREN  Apply 2 g topically once daily.         * This list has 5 medication(s) that are the same as other medications prescribed for you. Read the directions carefully, and ask your doctor or other care provider to review them with you.            ASK your doctor about these medications    * HYDROcodone-acetaminophen 5-325 mg per tablet  Commonly known as:  NORCO  Take 1 tablet by mouth every 8 (eight) hours as needed for Pain (moderate to severe).         * This list has 1  medication(s) that are the same as other medications prescribed for you. Read the directions carefully, and ask your doctor or other care provider to review them with you.                Juliocesar Parker PA-C  Neurosurgery  [facility]

## 2019-09-20 ENCOUNTER — CLINICAL SUPPORT (OUTPATIENT)
Dept: REHABILITATION | Facility: HOSPITAL | Age: 69
End: 2019-09-20
Attending: NEUROLOGICAL SURGERY
Payer: COMMERCIAL

## 2019-09-20 ENCOUNTER — TELEPHONE (OUTPATIENT)
Dept: NEUROSURGERY | Facility: CLINIC | Age: 69
End: 2019-09-20

## 2019-09-20 DIAGNOSIS — M53.3 SI (SACROILIAC) JOINT DYSFUNCTION: Primary | ICD-10-CM

## 2019-09-20 DIAGNOSIS — M62.81 MUSCLE WEAKNESS: ICD-10-CM

## 2019-09-20 DIAGNOSIS — R26.2 DIFFICULTY WALKING: ICD-10-CM

## 2019-09-20 PROCEDURE — 97161 PT EVAL LOW COMPLEX 20 MIN: CPT

## 2019-09-20 PROCEDURE — 97110 THERAPEUTIC EXERCISES: CPT

## 2019-09-20 NOTE — PLAN OF CARE
[facility]  Physical Therapy Initial Evaluation    Name: Raffy Rutherford   Clinic Number: [id number]    Therapy Diagnosis:   Encounter Diagnoses   Name Primary?    SI (sacroiliac) joint dysfunction Yes    Difficulty walking      Physician: Jason Caldwell MD    Physician Orders: PT Eval and Treat   Medical Diagnosis from Referral: M53.3 (ICD-10-CM) - Sacroiliac joint dysfunction of right side  Evaluation Date: 9/20/2019  Authorization Period Expiration: 12/31/2019  Plan of Care Expiration: 12/13/2019  Visit # / Visits authorized: 1/ 20  Total Visits: 1    Time In: 0900  Time Out: 0950 am   Total Billable Time: 50 minutes (1 initial evaluation, 1 TE)     Precautions: Standard    Subjective   Date of onset: ~ 4 years approximately with right SI joint dysfunction and recent injection by Dr. Caldwell yesterday   History of current condition - Raffy reports: I had a left hip replacement a few years ago maybe 7 years ago and I have been having right SI joint for the past 4 years or so. I received an injection in my right SI joint by Dr. Caldwell yesterday and I found that the pain is some better but it is not gone; I will probably follow up with Dr. Caldwell within a month or so      Past Medical History:   Diagnosis Date    Adenomatous polyp 2003    Adenomatous polyp     Allergy     Arthritis     Back pain     after trauma beginning in 195    Cataract     Chronic pain     neck and left shoulder    Cluster headache 2013    Colon polyp     Degenerative disc disease     Depression     Diverticulitis 12/2013    Elevated PSA     Fibromyalgia 2013    GERD (gastroesophageal reflux disease)     Hepatitis 1970's    A    History of prostate biopsy 2002    Hyperlipidemia     Joint pain     Sleep apnea     Special screening for malignant neoplasms, colon 5/5/2014    Thyroid nodule 7/16/2014    Visual disturbance 2012    problems after cataract surgery     Raffy Rutherford Jr.  has a past  surgical history that includes Sinus surgery; Knee surgery; Hemorrhoid surgery; Cholecystectomy; Sinus surgery; Eye surgery; Back surgery; Joint replacement; Total hip arthroplasty (4/2012); Cosmetic surgery (2/10/2015); Cosmetic surgery (2/10/2015); Spine surgery (6/22/15); Cataract extraction w/  intraocular lens implant (Bilateral); Spinal fusion (6/22/2015); Injection of steroid (Right, 12/6/2018); and Injection of steroid (Right, 9/19/2019).    Raffy has a current medication list which includes the following prescription(s): clonazepam, diclofenac sodium, hydrocodone-acetaminophen, hydrocodone-acetaminophen, hydrocortisone, lamotrigine, pravastatin, rabeprazole, selegiline, selegiline, selegiline, senna-docusate 8.6-50 mg, sucralfate, trazodone, and voltaren.    Review of patient's allergies indicates:   Allergen Reactions    Alphagan [brimonidine]      Patient taking MASO-B Selective Inhibitor Selegiline (Emsam)    Coumadin [warfarin]      itch    Oxycodone      hiccups    Pennsaid [diclofenac sodium]       Imaging,   MRI of lumbar spine 11/2/2018  Impression       Stable postoperative changes of T12 vertebroplasty and posterior instrumented fusion from L3 through L5.    Multilevel lumbar spondylosis as detailed above, without significant spinal canal stenosis or neural foraminal narrowing.     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     Objective     TUG - 15 seconds without AD  5TSTS - 19 seconds with increase in UE support     Gait: patient able to ambulate without AD with decreased sudeep, decreased step length, forward flexion of trunk, mild pathway deviation, increase in lateral weightshift, decreased bilateral heel strike at initial contact, foot slapping through bilateral feet      Posture:  Forward flexed posture, forward head, and rounded/protracted shoulder appearance     Lumbar Range of Motion:    Degrees Pain   Flexion 15 cm from finger tip to floor    Yes         Extension Decreased 25%   no        Left Side Bending 41 cm from finger tip to floor  yes   Right Side Bending 43 cm from finger tip to floor  no        Left rotation   Decreased 25%  no   Right Rotation   Decreased 50% yes      Lower Extremity Strength  Right LE  Left LE    Hip flexion: 4-/5 Hip flexion: 4+/5   Hip extension:  4-/5 Hip extension: 4-/5   Hip abduction: 4-/5 Hip abduction: 4/5   Hip adduction: 4/5 Hip adduction 5/5   Knee Extension 4+/5 Knee Extension 4+/5   Knee Flexion 5/5 Knee Flexion 5/5   Ankle dorsiflexion: 3+/5 Ankle dorsiflexion: 4-/5   Ankle plantarflexion: 2-/5 Ankle plantarflexion: 2/5     Special Tests:  -Piriformis Test: positive right, negative left  -Bridge Test: no pain, able to maintain     Leg length discrepancy: R = 90.5 cm, L = 91.5 cm     Neuro Dynamic Testing:     Joint Mobility: good    Palpation: tenderness to palpation and increase in tissue tension at right>left lumbosacral PSM as well as at right piriformis and posterior hip musculature     Sensation: decreased right more limited then left     Flexibility:    Hamstring 90/90: R= 24 deg; L = 22 deg    CMS Impairment/Limitation/Restriction for FOTO Lumbar Spine Survey    Therapist reviewed FOTO scores for Raffy Rutherford Jr. on 9/20/2019.   FOTO documents entered into EPIC - see Media  section.    Limitation Score: 58%  Category: Mobility    Current : CK = at least 40% but < 60% impaired, limited or restricted  Goal: CK = at least 40% but < 60% impaired, limited or restricted  Discharge: CK = at least 40% but < 60% impaired, limited or restricted         TREATMENT   Treatment Time In: 09:40 am   Treatment Time Out: 09:50 am   Total Treatment time separate from Evaluation: 10 minutes    Raffy received therapeutic exercises to develop strength, endurance, ROM, flexibility, posture and core stabilization for 10 minutes including:  PT reviewed sitting posture and positioning including decreasing right hip external rotation and crossing legs while at work with patient demonstrating understanding to decrease stress to right SI joint region.   PT reviewed TA activation for patient to complete daily intermittently throughout the day without holding his breath with patient able to return demonstration with fair TA.   PT reviewed modified piriformis stretching with right LE with patient able to return demonstration for 30 seconds x 3 repetitions.   PT reviewed ankle TB exercises with each LE including DF and PF with patient demonstrating understanding.    Home Exercises and Patient Education Provided    Education provided:   Role of aquatic therapy    Written Home Exercises Provided: yes.  Exercises were reviewed and Raffy was able to demonstrate them prior to the end of the session.  Raffy demonstrated good  understanding of the education provided.     See EMR under Patient Instructions for exercises provided 9/20/2019.    Assessment   Raffy is a 69 y.o. male referred to outpatient Physical Therapy with a medical diagnosis of SI joint dysfunction of the right. Pt presents with decreased strength through right > left LE, decreased flexibility, potential leg length discrepancy (may benefit from insert in shoe), abnormal gait pattern, increase in tissue tension and tenderness to palpation at right>left  lumbosacral and piriformis region, fair TA activation, TUG in 15 seconds, 5 STS in 19 seconds, and score of 58% impaired, limited or restricted on FOTO with overall increase in pain with functional mobility and ADLS which impacts the patient's ability to complete functional tasks & activities safely & timely.    Pt prognosis is Good.   Pt will benefit from skilled aquatic outpatient Physical Therapy to address the deficits stated above and in the chart below, provide pt/family education, and to maximize pt's level of independence.     Plan of care discussed with patient: Yes  Pt's spiritual, cultural and educational needs considered and patient is agreeable to the plan of care and goals as stated below:     Anticipated Barriers for therapy: chronicity of pain    Medical Necessity is demonstrated by the following  History  Co-morbidities and personal factors that may impact the plan of care Co-morbidities:   prior hip surgery and prior lumbar surgery    Personal Factors:   no deficits     low   Examination  Body Structures and Functions, activity limitations and participation restrictions that may impact the plan of care Body Regions:   back  lower extremities  trunk    Body Systems:    gross symmetry  ROM  strength  gross coordinated movement  balance  gait  transfers  transitions  motor control  motor learning    Participation Restrictions:   none    Activity limitations:   Learning and applying knowledge  no deficits    General Tasks and Commands  no deficits    Communication  no deficits    Mobility  lifting and carrying objects  walking    Self care  no deficits    Domestic Life  cooking  doing house work (cleaning house, washing dishes, laundry)    Interactions/Relationships  no deficits    Life Areas  no deficits    Community and Social Life  no deficits         low   Clinical Presentation stable and uncomplicated low   Decision Making/ Complexity Score: low     Goals:  Short Term Goals: 6 weeks   1. Patient  will be independent in HEP & progressions  2. Patient will be able to achieve less than or equal to 12 seconds with Timed Up and Go without assistive device demonstrating overall improved functional mobility.  3. Patient will be able to achieve bilateral hip and knee MMT 4+/5 grossly demonstrating overall improved strength.       Long Term Goals: 12 weeks   1. Patient will have FOTO limitation score of less then or equal to 50 % impaired, limited, or restricted category demonstrating less pain with functional mobility and ADLS.   2. Patient will be able to achieve 5 sit to/from stand transitions in less then or equal to 15 seconds with/without UE support demonstrating overall improved functional mobility.   3. Patient will be able to achieve bilateral hip and knee MMT 5/5 grossly demonstrating overall improved strength.  4. Patient will be able to achieve lumbar spine flexion less then or equal to 10 cm from finger tip to floor measurement demonstrating improved lumbar spine flexion AROM and improved bilateral hamstring flexibility.     Plan   Plan of care Certification: 9/20/2019 to 12/13/2019.    Outpatient aquatic Physical Therapy 1-2 times weekly for 12 weeks to include the following interventions: manual therapy, aquatic therapy, patient education, therapeutic exercise, therapeutic activities  Patient may be seen by PTA as part of rehabilitation team      Marielle Fisher, PT

## 2019-10-03 ENCOUNTER — PATIENT MESSAGE (OUTPATIENT)
Dept: NEUROSURGERY | Facility: CLINIC | Age: 69
End: 2019-10-03

## 2019-10-07 RX ORDER — METHOCARBAMOL 750 MG/1
750 TABLET, FILM COATED ORAL 3 TIMES DAILY PRN
Qty: 90 TABLET | Refills: 0 | Status: SHIPPED | OUTPATIENT
Start: 2019-10-07 | End: 2020-02-26

## 2019-10-08 ENCOUNTER — TELEPHONE (OUTPATIENT)
Dept: NEUROSURGERY | Facility: CLINIC | Age: 69
End: 2019-10-08

## 2019-10-08 NOTE — TELEPHONE ENCOUNTER
Spoke with Mr Rutherford, pt states Dr Caldwell can send script to [facility]-  [facility] - he will be back home soon.        Downtrending, with Ca+2 of 10.7 this morning. Likely secondary to malignancy. Currently asymptomatic with normal mentation and no complaint of pain anywhere.  - f/u BMP trend K and Ca  - f/u [facility]  - no IV fluids for now  - d/c calcitonin  - if hypercalcemic again, would resume her home dose of Lasix.   - follow up nephrology recommendations

## 2019-10-09 RX ORDER — METHYLPREDNISOLONE 4 MG/1
TABLET ORAL
Qty: 1 PACKAGE | Refills: 0 | Status: ON HOLD | OUTPATIENT
Start: 2019-10-09 | End: 2019-10-25 | Stop reason: CLARIF

## 2019-10-14 ENCOUNTER — PATIENT MESSAGE (OUTPATIENT)
Dept: NEUROSURGERY | Facility: CLINIC | Age: 69
End: 2019-10-14

## 2019-10-14 NOTE — TELEPHONE ENCOUNTER
Good morning, unfortunately Dr Caldwell does not have anything available today and he is in surgery on tomorrow. The soonest is on the 16th. If you are having this much pain then you need to go the the emergency room thanks.

## 2019-10-15 ENCOUNTER — CLINICAL SUPPORT (OUTPATIENT)
Dept: REHABILITATION | Facility: HOSPITAL | Age: 69
End: 2019-10-15
Attending: NEUROLOGICAL SURGERY
Payer: COMMERCIAL

## 2019-10-15 DIAGNOSIS — G89.29 OTHER CHRONIC PAIN: ICD-10-CM

## 2019-10-15 PROCEDURE — 97113 AQUATIC THERAPY/EXERCISES: CPT

## 2019-10-15 NOTE — PROGRESS NOTES
"Physical Therapy Daily Treatment Note     Name: Raffy Rutherford Jr.  Clinic Number: [id number]    Therapy Diagnosis:   Encounter Diagnosis   Name Primary?    Other chronic pain      Physician: Jason Caldwell MD    Visit Date: 10/15/2019     Physician Orders: PT Eval and Treat   Medical Diagnosis from Referral: M53.3 (ICD-10-CM) - Sacroiliac joint dysfunction of right side  Evaluation Date: 9/20/2019  Authorization Period Expiration: 12/31/2019  Plan of Care Expiration: 12/13/2019  Visit # / Visits authorized: 2/ 20  Total Visits: 2    Time In: 0900  Time Out: 1000  Total Billable Time: 30 minutes    Precautions: Standard    PROCEDURES/IMAGING: ALYSON R SI  Joint 9/19/2019  Subjective     Pt reports: The pain got worse 2 days after I got the injection"  He was compliant with home exercise program.  Response to previous treatment: 1st pool treatment  Functional change: walks into clinic using staff support    Pain: 9/10; 7/10 after therapy  Location: right back  and lower legs     Objective     Raffy received aquatic therapeutic exercises to develop strength, endurance, ROM, flexibility, posture and core stabilization for 60 minutes including:    WARMUP x 2 laps each  Walk fwd/back; side caused increased    STRETCHES 3 x 20sec  Hamstring  Piriformis    LE EX x 20  Mini squat  Seaweed using yellow neck collar & noodle under arms & knees    UE EX/CORE   none      ENDURANCE  None    COOL DOWN x 1 lap each  Walk fwd/back/side      Home Exercises Provided and Patient Education Provided     Education provided:   Role of aquatic therapy  Hydration post therap    Written Home Exercises Provided: Patient instructed to cont prior HEP.  Exercises were reviewed and Raffy was able to demonstrate them prior to the end of the session.  Raffy demonstrated good understanding of the education provided.     See EMR under Patient Instructions for exercises provided prior visit 9/20/2019    Assessment   Patient presented with high pain " levels. & was unable to tolerate exercise program. Patient did get relief of some pain to 7/10 after PT performed seaweed movements  Patient required frequent verbal cues  for proper technique & core stabilization. Patient did fair post instruction. Patient will benefit from aquatic environment to unload  Spine & Bilateral LEs for strengthening, core stabilization & postural awareness.      Raffy is progressing well towards his goals.   Pt prognosis is Fair.     Pt will continue to benefit from skilled aquatic outpatient physical therapy to address the deficits listed in the problem list box on initial evaluation, provide pt/family education and to maximize pt's level of independence in the home and community environment.     Pt's spiritual, cultural and educational needs considered and pt agreeable to plan of care and goals.    Anticipated barriers to physical therapy: chronic pain    Goals:   Short Term Goals: 6 weeks   1. Patient will be independent in HEP & progressions (progressing- not met)  2. Patient will be able to achieve less than or equal to 12 seconds with Timed Up and Go without assistive device demonstrating overall improved functional mobility. (progressing- not met)  3. Patient will be able to achieve bilateral hip and knee MMT 4+/5 grossly demonstrating overall improved strength.(progressing- not met)        Long Term Goals: 12 weeks   1. Patient will have FOTO limitation score of less then or equal to 50 % impaired, limited, or restricted category demonstrating less pain with functional mobility and ADLS. (progressing- not met)  2. Patient will be able to achieve 5 sit to/from stand transitions in less then or equal to 15 seconds with/without UE support demonstrating overall improved functional mobility. (progressing- not met)  3. Patient will be able to achieve bilateral hip and knee MMT 5/5 grossly demonstrating overall improved strength.(progressing- not met)  4. Patient will be able to  achieve lumbar spine flexion less then or equal to 10 cm from finger tip to floor measurement demonstrating improved lumbar spine flexion AROM and improved bilateral hamstring flexibility. (progressing- not met)        Plan   Add exercises slowly to prevent pain & determine tolerance for activity    Plan of care Certification: 9/20/2019 to 12/13/2019.     Outpatient aquatic Physical Therapy 1-2 times weekly for 12 weeks to include the following interventions: manual therapy, aquatic therapy, patient education, therapeutic exercise, therapeutic activities  Patient may be seen by PTA as part of rehabilitation team    Antonia Ca, PT

## 2019-10-16 ENCOUNTER — OFFICE VISIT (OUTPATIENT)
Dept: NEUROSURGERY | Facility: CLINIC | Age: 69
End: 2019-10-16
Payer: COMMERCIAL

## 2019-10-16 VITALS
WEIGHT: 170 LBS | DIASTOLIC BLOOD PRESSURE: 84 MMHG | SYSTOLIC BLOOD PRESSURE: 132 MMHG | BODY MASS INDEX: 25.76 KG/M2 | HEIGHT: 68 IN | HEART RATE: 70 BPM

## 2019-10-16 DIAGNOSIS — M47.818 SI JOINT ARTHRITIS: Primary | ICD-10-CM

## 2019-10-16 PROCEDURE — 1101F PR PT FALLS ASSESS DOC 0-1 FALLS W/OUT INJ PAST YR: ICD-10-PCS | Mod: CPTII,S$GLB,, | Performed by: NEUROLOGICAL SURGERY

## 2019-10-16 PROCEDURE — 99213 PR OFFICE/OUTPT VISIT, EST, LEVL III, 20-29 MIN: ICD-10-PCS | Mod: S$GLB,,, | Performed by: NEUROLOGICAL SURGERY

## 2019-10-16 PROCEDURE — 1101F PT FALLS ASSESS-DOCD LE1/YR: CPT | Mod: CPTII,S$GLB,, | Performed by: NEUROLOGICAL SURGERY

## 2019-10-16 PROCEDURE — 99999 PR PBB SHADOW E&M-EST. PATIENT-LVL IV: CPT | Mod: PBBFAC,,, | Performed by: NEUROLOGICAL SURGERY

## 2019-10-16 PROCEDURE — 99213 OFFICE O/P EST LOW 20 MIN: CPT | Mod: S$GLB,,, | Performed by: NEUROLOGICAL SURGERY

## 2019-10-16 PROCEDURE — 99999 PR PBB SHADOW E&M-EST. PATIENT-LVL IV: ICD-10-PCS | Mod: PBBFAC,,, | Performed by: NEUROLOGICAL SURGERY

## 2019-10-16 RX ORDER — CELECOXIB 200 MG/1
200 CAPSULE ORAL 2 TIMES DAILY
Qty: 60 CAPSULE | Refills: 0 | Status: SHIPPED | OUTPATIENT
Start: 2019-10-16 | End: 2019-11-29 | Stop reason: SDUPTHER

## 2019-10-16 RX ORDER — HYDROCODONE BITARTRATE AND ACETAMINOPHEN 7.5; 325 MG/1; MG/1
1 TABLET ORAL EVERY 12 HOURS PRN
Qty: 60 TABLET | Refills: 0 | Status: ON HOLD | OUTPATIENT
Start: 2019-10-16 | End: 2019-10-26 | Stop reason: SDUPTHER

## 2019-10-17 ENCOUNTER — IMMUNIZATION (OUTPATIENT)
Dept: PHARMACY | Facility: CLINIC | Age: 69
End: 2019-10-17
Payer: MEDICARE

## 2019-10-17 ENCOUNTER — HOSPITAL ENCOUNTER (OUTPATIENT)
Dept: RADIOLOGY | Facility: OTHER | Age: 69
Discharge: HOME | End: 2019-10-17
Attending: NEUROLOGICAL SURGERY
Payer: COMMERCIAL

## 2019-10-17 DIAGNOSIS — M47.818 SI JOINT ARTHRITIS: ICD-10-CM

## 2019-10-17 PROCEDURE — 72197 MRI PELVIS W/O & W/DYE: CPT | Mod: TC

## 2019-10-17 PROCEDURE — 25500020 PHARM REV CODE 255: Performed by: NEUROLOGICAL SURGERY

## 2019-10-17 PROCEDURE — 72197 MRI PELVIS W/O & W/DYE: CPT | Mod: 26,,, | Performed by: RADIOLOGY

## 2019-10-17 PROCEDURE — 72197 MRI SACROILIAC JOINTS WITH AND WITHOUT: ICD-10-PCS | Mod: 26,,, | Performed by: RADIOLOGY

## 2019-10-17 PROCEDURE — A9585 GADOBUTROL INJECTION: HCPCS | Performed by: NEUROLOGICAL SURGERY

## 2019-10-17 RX ORDER — GADOBUTROL 604.72 MG/ML
7.5 INJECTION INTRAVENOUS
Status: COMPLETED | OUTPATIENT
Start: 2019-10-17 | End: 2019-10-17

## 2019-10-17 RX ADMIN — GADOBUTROL 7.5 ML: 604.72 INJECTION INTRAVENOUS at 03:10

## 2019-10-17 NOTE — PROGRESS NOTES
NEUROSURGICAL PROGRESS NOTE    DATE OF SERVICE:  10/16/2019    ATTENDING PHYSICIAN:  Jason Caldwell MD    SUBJECTIVE:    INTERIM HISTORY:    This is a very pleasant 69 y.o. male, who is status post about 2 weeks right SI joint block and steroid injection. He reported more than 75% pain relief for the first two days after the injection then the pain has become worse. He has been travelling. Now the pain is worse than before the injection to the point that he needs to mobilize in a wheelchair. He is still able to walk using two canes. Pain is worse with weight bearing. He is limping. No new weakness or numbness.     Low Back Pain Scale  R Low Back-Pain Score: 9  R Low Back-Pain Intensity: Pain killers give moderate relief from pain  R Low Back-Pain Score: It is painful to look after myself and I am slow and careful  Low Back-Lifting: I can only lift very light weights   Low Back-Walking: I can only walk using a cane or crutches   Low Back-Sitting: Pain prevents me from sitting more than 1 hour   Low Back-Standing: I avoid standing because it increases the pain immediately   Low Back-Sleeping: Because of pain my normal nights sleep is reduced by less than three quarters   Low Back-Social Life: Pain has no significant effect on my social life apart from limiting my more en   Low Back-Traveling: Pain restricts all forms of travel   Low Back-Changing Degree of Pain: my pain is rapidly worsening         PAST MEDICAL HISTORY:  Active Ambulatory Problems     Diagnosis Date Noted    Depression     Hyperlipidemia     Chronic pain     Colon polyp     Adenomatous polyp     History of prostate biopsy     GERD (gastroesophageal reflux disease)     Back pain     Sleep apnea     Visual disturbances 10/03/2012    Spondylosis without myelopathy 11/09/2012    Degeneration of lumbar or lumbosacral intervertebral disc 11/09/2012    Spinal stenosis, lumbar region, without neurogenic claudication 11/09/2012    Thoracic or  lumbosacral neuritis or radiculitis, unspecified 11/09/2012    Displacement of lumbar intervertebral disc without myelopathy 11/09/2012    Acquired spondylolisthesis 11/09/2012    Lumbago 11/09/2012    Status post cataract extraction and insertion of intraocular lens - Both Eyes 11/13/2012    Prostatitis, acute 12/17/2012    Fibromyalgia 10/21/2013    OP (osteoporosis) 10/21/2013    Compression fracture of T12 vertebra 10/21/2013    Diverticulitis large intestine 12/21/2013    Osteoarthritis 03/19/2014    Lower back pain 04/01/2014    Lower extremity pain 04/01/2014    Muscle weakness 04/01/2014    Range of motion deficit 04/01/2014    Special screening for malignant neoplasms, colon 05/05/2014    Cervicalgia 06/19/2014    Facet joint disease of cervical region 06/19/2014    Occipital neuralgia 06/19/2014    Chronic migraine without aura, with intractable migraine, so stated, with status migrainosus 06/19/2014    Cervical radiculopathy 06/19/2014    Paroxysmal hemicrania 06/19/2014    Sciatic nerve pain 06/19/2014    Chronic LBP 06/27/2014    DJD (degenerative joint disease) of lumbar spine 06/27/2014    Chronic neck pain 06/27/2014    Right lumbar radiculopathy 06/27/2014    Thyroid nodule 07/16/2014    Carpal tunnel syndrome of right wrist 07/16/2014    Paresthesia 08/01/2014    Brow ptosis 02/10/2015    Degenerative disc disease 06/22/2015    Encounter for postoperative wound check 08/01/2015    Lumbar radiculopathy 09/15/2015    Cervical spondylosis 10/22/2015    Low back pain without sciatica 10/27/2015    Lumbar radicular pain 10/27/2015    S/P lumbar spinal fusion 12/02/2015    Gait abnormality 02/21/2017    Impaired functional mobility, balance, gait, and endurance 02/24/2017    Left hip pain 05/24/2017    Right wrist tendinitis 07/28/2017    Pain in right wrist 07/28/2017    Difficulty walking 11/01/2017    Weakness 11/01/2017    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 11/10/2017    Headache around the eyes 06/26/2018    Lumbar back pain with radiculopathy affecting lower extremity 11/15/2018    Closed fracture of left distal radius 02/05/2019    Pain in left wrist 03/19/2019    Bilateral foot-drop 07/16/2019    Idiopathic peripheral neuropathy 07/16/2019    Sacroiliac joint dysfunction of right side 07/16/2019    SI (sacroiliac) joint dysfunction 09/19/2019     Resolved Ambulatory Problems     Diagnosis Date Noted    Blepharitis of both eyes - Both Eyes 11/13/2012     Past Medical History:   Diagnosis Date    Allergy     Arthritis     Cataract     Cluster headache 2013    Diverticulitis 12/2013    Elevated PSA     Hepatitis 1970's    Joint pain     Visual disturbance 2012       PAST SURGICAL HISTORY:  Past Surgical History:   Procedure Laterality Date    BACK SURGERY      CATARACT EXTRACTION W/  INTRAOCULAR LENS IMPLANT Bilateral     CHOLECYSTECTOMY      COSMETIC SURGERY  2/10/2015    Direct mid-forehead brow lift    COSMETIC SURGERY  2/10/2015    Bilateral upper lid blepahroplasty    EYE SURGERY      HEMORRHOID SURGERY      with complication of chronic bleeding for 6 weeks     INJECTION OF STEROID Right 12/6/2018    Procedure: INJECTION, STEROID Right SI Joint Block and Steroid Injection;  Surgeon: Jason Caldwell MD;  Location: [facility] OR;  Service: Neurosurgery;  Laterality: Right;  Procedure: Right SI Joint Block and Steroid Injection  Surgery Time: 30 MIN  LOS: 0  Anesthesia: MAC  Radiology: C-arm  Bed: Loyal 4 Poster  Position: Prone    INJECTION OF STEROID Right 9/19/2019    Procedure: INJECTION, STEROID Procedure: Right SI joint block nd steroid injection;  Surgeon: Jason Caldwell MD;  Location: [facility] OR;  Service: Neurosurgery;  Laterality: Right;  Procedure: Right SI joint block nd steroid injection  Surgery Time: 30 mins  LOS:   Anesthesia: General MAC  Radiology:C-arm  Bed: Regular Bed  Position: Prone    JOINT  REPLACEMENT      KNEE SURGERY      involving arthroscopic surgery to both knees    SINUS SURGERY      SINUS SURGERY      left molar and sinus surgery for trigeminal neuralgia    SPINAL FUSION  6/22/2015    L3-L5 XLIF    SPINE SURGERY  6/22/15    XLIF/TANA    TOTAL HIP ARTHROPLASTY  4/2012    Pt states he had total hip replacement on his left hip.       SOCIAL HISTORY:   Social History     Socioeconomic History    Marital status:      Spouse name: Not on file    Number of children: Not on file    Years of education: Not on file    Highest education level: Not on file   Occupational History    Occupation: Psychotherpist    Social Needs    Financial resource strain: Not on file    Food insecurity:     Worry: Not on file     Inability: Not on file    Transportation needs:     Medical: Not on file     Non-medical: Not on file   Tobacco Use    Smoking status: Never Smoker    Smokeless tobacco: Never Used   Substance and Sexual Activity    Alcohol use: Yes     Comment: occasion    Drug use: No    Sexual activity: Yes     Partners: Female   Lifestyle    Physical activity:     Days per week: Not on file     Minutes per session: Not on file    Stress: Not on file   Relationships    Social connections:     Talks on phone: Not on file     Gets together: Not on file     Attends [employer] service: Not on file     Active member of club or organization: Not on file     Attends meetings of clubs or organizations: Not on file     Relationship status: Not on file   Other Topics Concern    Not on file   Social History Narrative    Not on file       FAMILY HISTORY:  Family History   Problem Relation Age of Onset    Cancer Mother         colon; uterine    Nephrolithiasis Mother     Stroke Mother 86    Pancreatitis Brother     Vision loss Brother         macular degeneration    Diabetes Brother     Macular degeneration Brother     Migraines Sister     No Known Problems Father     No  Known Problems Maternal Grandmother     No Known Problems Maternal Grandfather     No Known Problems Paternal Grandmother     No Known Problems Paternal Grandfather     No Known Problems Sister     No Known Problems Maternal Aunt     No Known Problems Maternal Uncle     No Known Problems Paternal Aunt     No Known Problems Paternal Uncle     Melanoma Neg Hx     Amblyopia Neg Hx     Blindness Neg Hx     Cataracts Neg Hx     Glaucoma Neg Hx     Hypertension Neg Hx     Retinal detachment Neg Hx     Strabismus Neg Hx     Thyroid disease Neg Hx     Allergic rhinitis Neg Hx     Allergies Neg Hx     Angioedema Neg Hx     Asthma Neg Hx     Eczema Neg Hx     Urticaria Neg Hx     Rhinitis Neg Hx     Immunodeficiency Neg Hx     Atopy Neg Hx        CURRENTS MEDICATIONS:  Current Outpatient Medications on File Prior to Visit   Medication Sig Dispense Refill    clonazePAM (KLONOPIN) 0.5 MG tablet Take up to 2 tablets by mouth every night at bedtime 60 tablet 5    HYDROcodone-acetaminophen (NORCO) 5-325 mg per tablet Take 1 tablet by mouth every 8 (eight) hours as needed for Pain (moderate to severe). 21 tablet 0    lamoTRIgine (LAMICTAL) 200 MG tablet take 1 tablet by mouth every day 90 tablet 3    methocarbamol (ROBAXIN) 750 MG Tab Take 1 tablet (750 mg total) by mouth 3 (three) times daily as needed. 90 tablet 0    methylPREDNISolone (MEDROL DOSEPACK) 4 mg tablet use as directed 1 Package 0    pravastatin (PRAVACHOL) 40 MG tablet Take 1 tablet (40 mg total) by mouth once daily. 90 tablet 3    RABEprazole (ACIPHEX) 20 mg tablet Take 1 tablet (20 mg total) by mouth 2 (two) times daily. 180 tablet 3    selegiline (EMSAM) 12 mg/24 hr Place onto the skin once daily. 90 patch 3    sucralfate (CARAFATE) 1 gram tablet TAKE 1 TABLET BY MOUTH 4 TIMES A  tablet 4    traZODone (DESYREL) 100 MG tablet take 1 tablet by mouth every night at bedtime 90 tablet 3    VOLTAREN 1 % Gel        [DISCONTINUED] HYDROcodone-acetaminophen (NORCO) 7.5-325 mg per tablet take 1 tablet by mouth every 8 hours as needed for pain 12 tablet 0    diclofenac sodium (VOLTAREN) 1 % Gel Apply 2 g topically once daily. (Patient not taking: Reported on 10/16/2019) 100 g 0    hydrocortisone 2.5 % cream Apply topically 2 (two) times daily. for 10 days 20 g 0    selegiline (EMSAM) 12 mg/24 hr Place 1 patch onto the skin once daily. (Patient not taking: Reported on 10/16/2019) 90 patch 3    selegiline (EMSAM) 12 mg/24 hr Place onto the skin once daily 90 patch 3    senna-docusate 8.6-50 mg (PERICOLACE) 8.6-50 mg per tablet Take 2 tablets by mouth nightly as needed for Constipation. (Patient not taking: Reported on 10/16/2019)       No current facility-administered medications on file prior to visit.        ALLERGIES:  Review of patient's allergies indicates:   Allergen Reactions    Alphagan [brimonidine]      Patient taking MASO-B Selective Inhibitor Selegiline (Emsam)    Coumadin [warfarin]      itch    Oxycodone      hiccups    Pennsaid [diclofenac sodium]        REVIEW OF SYSTEMS:  Review of Systems   Constitutional: Negative for diaphoresis, fever and weight loss.   Respiratory: Negative for shortness of breath.    Cardiovascular: Negative for chest pain.   Gastrointestinal: Negative for blood in stool.   Genitourinary: Negative for hematuria.   Endo/Heme/Allergies: Does not bruise/bleed easily.   All other systems reviewed and are negative.        OBJECTIVE:    PHYSICAL EXAMINATION:   Vitals:    10/16/19 1650   BP: 132/84   Pulse: 70       Physical Exam:  Vitals reviewed.    Constitutional: He appears well-developed and well-nourished.     Eyes: Pupils are equal, round, and reactive to light. Conjunctivae and EOM are normal.     Cardiovascular: Normal distal pulses and no edema.     Abdominal: Soft.     Skin: Skin displays no rash on trunk and no rash on extremities. Skin displays no lesions on trunk and no lesions  on extremities.     Psych/Behavior: He is alert. He is oriented to person, place, and time. He has a normal mood and affect.     Musculoskeletal:        Neck: Range of motion is full.       Back Exam     Tenderness   The patient is experiencing tenderness in the sacroiliac.    Range of Motion   Extension: normal   Flexion: normal   Lateral bend right: normal   Lateral bend left: normal   Rotation right: normal   Rotation left: normal     Tests   Straight leg raise right: negative  Straight leg raise left: negative    Other   Toe walk: normal  Heel walk: normal            SI joint:   Palpation of right SI joint is very painful      Neurologic Exam     Mental Status   Oriented to person, place, and time.     Cranial Nerves     CN III, IV, VI   Pupils are equal, round, and reactive to light.  Extraocular motions are normal.         DIAGNOSTIC DATA:  I personally interpreted the following imaging:   NA    ASSESMENT:  This is a 69 y.o. male with     Problem List Items Addressed This Visit     None      Visit Diagnoses     SI joint arthritis    -  Primary    Relevant Orders    MRI Pelvis W WO Contrast    Sedimentation rate    C-reactive protein    CBC auto differential    Creatinine, serum            PLAN:  Will call back after completion of workup.      Jason Caldwell MD  Cell:[phone]

## 2019-10-18 ENCOUNTER — PATIENT MESSAGE (OUTPATIENT)
Dept: NEUROSURGERY | Facility: CLINIC | Age: 69
End: 2019-10-18

## 2019-10-18 ENCOUNTER — TELEPHONE (OUTPATIENT)
Dept: NEUROSURGERY | Facility: CLINIC | Age: 69
End: 2019-10-18

## 2019-10-18 DIAGNOSIS — M51.27 DEGENERATION OF LUMBOSACRAL INTERVERTEBRAL DISC WITH ACUTE HERNIATION: Primary | ICD-10-CM

## 2019-10-18 DIAGNOSIS — M51.36 DEGENERATION OF LUMBOSACRAL INTERVERTEBRAL DISC WITH ACUTE HERNIATION: Primary | ICD-10-CM

## 2019-10-18 NOTE — TELEPHONE ENCOUNTER
----- Message from Jason Caldwell MD sent at 10/18/2019 11:50 AM CDT -----  Please give him an appointment with me in 2 weeks.  He has a new disc herniation at L5-S1 and might need surgery.  I will send him to physical therapy at this time.

## 2019-10-21 ENCOUNTER — OFFICE VISIT (OUTPATIENT)
Dept: NEUROLOGY | Facility: CLINIC | Age: 69
End: 2019-10-21
Payer: COMMERCIAL

## 2019-10-21 VITALS
HEIGHT: 68 IN | DIASTOLIC BLOOD PRESSURE: 68 MMHG | WEIGHT: 174 LBS | SYSTOLIC BLOOD PRESSURE: 103 MMHG | HEART RATE: 62 BPM | BODY MASS INDEX: 26.37 KG/M2

## 2019-10-21 DIAGNOSIS — G44.039 PAROXYSMAL HEMICRANIA: Primary | ICD-10-CM

## 2019-10-21 DIAGNOSIS — M51.37 DEGENERATION OF LUMBAR OR LUMBOSACRAL INTERVERTEBRAL DISC: ICD-10-CM

## 2019-10-21 DIAGNOSIS — F32.A DEPRESSION, UNSPECIFIED DEPRESSION TYPE: ICD-10-CM

## 2019-10-21 DIAGNOSIS — M54.50 LOW BACK PAIN WITHOUT SCIATICA, UNSPECIFIED BACK PAIN LATERALITY, UNSPECIFIED CHRONICITY: ICD-10-CM

## 2019-10-21 DIAGNOSIS — G43.909 MIGRAINE WITHOUT STATUS MIGRAINOSUS, NOT INTRACTABLE, UNSPECIFIED MIGRAINE TYPE: ICD-10-CM

## 2019-10-21 DIAGNOSIS — Z98.1 S/P LUMBAR SPINAL FUSION: ICD-10-CM

## 2019-10-21 PROCEDURE — 1125F AMNT PAIN NOTED PAIN PRSNT: CPT | Mod: S$GLB,,, | Performed by: PSYCHIATRY & NEUROLOGY

## 2019-10-21 PROCEDURE — 1101F PR PT FALLS ASSESS DOC 0-1 FALLS W/OUT INJ PAST YR: ICD-10-PCS | Mod: CPTII,S$GLB,, | Performed by: PSYCHIATRY & NEUROLOGY

## 2019-10-21 PROCEDURE — 99999 PR PBB SHADOW E&M-EST. PATIENT-LVL IV: CPT | Mod: PBBFAC,,, | Performed by: STUDENT IN AN ORGANIZED HEALTH CARE EDUCATION/TRAINING PROGRAM

## 2019-10-21 PROCEDURE — 99214 PR OFFICE/OUTPT VISIT, EST, LEVL IV, 30-39 MIN: ICD-10-PCS | Mod: S$GLB,,, | Performed by: PSYCHIATRY & NEUROLOGY

## 2019-10-21 PROCEDURE — 1101F PT FALLS ASSESS-DOCD LE1/YR: CPT | Mod: CPTII,S$GLB,, | Performed by: PSYCHIATRY & NEUROLOGY

## 2019-10-21 PROCEDURE — 1159F MED LIST DOCD IN RCRD: CPT | Mod: S$GLB,,, | Performed by: PSYCHIATRY & NEUROLOGY

## 2019-10-21 PROCEDURE — 99214 OFFICE O/P EST MOD 30 MIN: CPT | Mod: S$GLB,,, | Performed by: PSYCHIATRY & NEUROLOGY

## 2019-10-21 PROCEDURE — 99999 PR PBB SHADOW E&M-EST. PATIENT-LVL IV: ICD-10-PCS | Mod: PBBFAC,,, | Performed by: STUDENT IN AN ORGANIZED HEALTH CARE EDUCATION/TRAINING PROGRAM

## 2019-10-21 PROCEDURE — 1125F PR PAIN SEVERITY QUANTIFIED, PAIN PRESENT: ICD-10-PCS | Mod: S$GLB,,, | Performed by: PSYCHIATRY & NEUROLOGY

## 2019-10-21 PROCEDURE — 1159F PR MEDICATION LIST DOCUMENTED IN MEDICAL RECORD: ICD-10-PCS | Mod: S$GLB,,, | Performed by: PSYCHIATRY & NEUROLOGY

## 2019-10-21 RX ORDER — INDOMETHACIN 25 MG/1
25 CAPSULE ORAL 3 TIMES DAILY PRN
Qty: 30 CAPSULE | Refills: 0 | Status: SHIPPED | OUTPATIENT
Start: 2019-10-21 | End: 2020-03-02

## 2019-10-22 ENCOUNTER — PATIENT MESSAGE (OUTPATIENT)
Dept: NEUROSURGERY | Facility: CLINIC | Age: 69
End: 2019-10-22

## 2019-10-22 ENCOUNTER — TELEPHONE (OUTPATIENT)
Dept: PHARMACY | Facility: CLINIC | Age: 69
End: 2019-10-22

## 2019-10-22 ENCOUNTER — CLINICAL SUPPORT (OUTPATIENT)
Dept: REHABILITATION | Facility: HOSPITAL | Age: 69
End: 2019-10-22
Attending: NEUROLOGICAL SURGERY
Payer: COMMERCIAL

## 2019-10-22 DIAGNOSIS — G89.29 OTHER CHRONIC PAIN: ICD-10-CM

## 2019-10-22 PROCEDURE — 97113 AQUATIC THERAPY/EXERCISES: CPT

## 2019-10-22 NOTE — PROGRESS NOTES
Physical Therapy Daily Treatment Note     Name: Raffy Rutherford Jr.  Clinic Number: [id number]    Therapy Diagnosis:   Encounter Diagnosis   Name Primary?    Other chronic pain      Physician: Jason Caldwell MD    Visit Date: 10/22/2019     Physician Orders: PT Eval and Treat   Medical Diagnosis from Referral: M53.3 (ICD-10-CM) - Sacroiliac joint dysfunction of right side  Evaluation Date: 9/20/2019  Authorization Period Expiration: 12/31/2019  Plan of Care Expiration: 12/13/2019  Visit # / Visits authorized: 3/ 20  Total Visits: 3    Time In: 1300  Time Out: 1400  Total Billable Time: 15 minutes    Precautions: Standard, LTHA    PROCEDURES/IMAGING: ALYSON R SI  Joint 9/19/2019; MRI SI Joint  10/17/2019 There is a large disc extrusion extending cranially from L5-S1 resulting in narrowing of the right lateral recess with compression of the right L5 nerve root.  Subjective     Pt reports: I have a disc out in my back  He was compliant with home exercise program.  Response to previous treatment: 1st pool treatment  Functional change: walks into clinic using staff support    Pain: 9/10  Location: right back & BLEs R>L    Objective     Raffy received aquatic therapeutic exercises to develop strength, endurance, ROM, flexibility, posture and core stabilization for 60 minutes including:    WARMUP x 2 laps each  Walk fwd/back; side using wonderboard    STRETCHES 3 x 20sec  Hamstring  Piriformis  Plantar   DKTC to hip extension // bars x 20    LE EX x 20  Mini squat  Seaweed using yellow neck collar & noodle under arms & knees-np  Wonderboard propel forward using heels x 2 laps  Wonderboard propel backward using toes x 2 laps  Wonderboard lateral x 2 laps  Flutter kick x 2 min(neck collar)  Bicycle x 2 min(neck collar)  Lower truncal rotation // bars x 20     UE EX/CORE   none      ENDURANCE  None    COOL DOWN x 1 lap each  Walk fwd/back/side      Home Exercises Provided and Patient Education Provided     Education provided:    Role of aquatic therapy  Hydration post therap    Written Home Exercises Provided: Patient instructed to cont prior HEP.  Exercises were reviewed and Raffy was able to demonstrate them prior to the end of the session.  Raffy demonstrated good understanding of the education provided.     See EMR under Patient Instructions for exercises provided prior visit 9/20/2019    Assessment   Patient presented with high pain levels.2/2 R L5 nerve compression but was able to tolerate some exercises in open chain position  Patient required frequent verbal cues  for TA activation. Patient will benefit from aquatic environment to unload  Spine & Bilateral LEs for strengthening, core stabilization & postural awareness.    Raffy is progressing well towards his goals.   Pt prognosis is Fair.     Pt will continue to benefit from skilled aquatic outpatient physical therapy to address the deficits listed in the problem list box on initial evaluation, provide pt/family education and to maximize pt's level of independence in the home and community environment.     Pt's spiritual, cultural and educational needs considered and pt agreeable to plan of care and goals.    Anticipated barriers to physical therapy: chronic pain    Goals:   Short Term Goals: 6 weeks   1. Patient will be independent in HEP & progressions (progressing- not met)  2. Patient will be able to achieve less than or equal to 12 seconds with Timed Up and Go without assistive device demonstrating overall improved functional mobility. (progressing- not met)  3. Patient will be able to achieve bilateral hip and knee MMT 4+/5 grossly demonstrating overall improved strength.(progressing- not met)        Long Term Goals: 12 weeks   1. Patient will have FOTO limitation score of less then or equal to 50 % impaired, limited, or restricted category demonstrating less pain with functional mobility and ADLS. (progressing- not met)  2. Patient will be able to achieve 5 sit to/from  stand transitions in less then or equal to 15 seconds with/without UE support demonstrating overall improved functional mobility. (progressing- not met)  3. Patient will be able to achieve bilateral hip and knee MMT 5/5 grossly demonstrating overall improved strength.(progressing- not met)  4. Patient will be able to achieve lumbar spine flexion less then or equal to 10 cm from finger tip to floor measurement demonstrating improved lumbar spine flexion AROM and improved bilateral hamstring flexibility. (progressing- not met)        Plan   Add exercises slowly to prevent pain & determine tolerance for activity    Plan of care Certification: 9/20/2019 to 12/13/2019.     Outpatient aquatic Physical Therapy 1-2 times weekly for 12 weeks to include the following interventions: manual therapy, aquatic therapy, patient education, therapeutic exercise, therapeutic activities  Patient may be seen by PTA as part of rehabilitation team    Antonia Ca, PT

## 2019-10-22 NOTE — TELEPHONE ENCOUNTER
Informed Patient  that [facility] received prescription for EMGALITY START AND MAINT. and prior authorization is required.  [facility] will be back in touch once insurance determination is received.

## 2019-10-23 RX ORDER — PRAVASTATIN SODIUM 40 MG/1
40 TABLET ORAL DAILY
Qty: 90 TABLET | Refills: 3 | Status: CANCELLED | OUTPATIENT
Start: 2019-10-23 | End: 2020-10-22

## 2019-10-24 RX ORDER — PRAVASTATIN SODIUM 40 MG/1
40 TABLET ORAL DAILY
Qty: 90 TABLET | Refills: 3 | Status: CANCELLED | OUTPATIENT
Start: 2019-10-23 | End: 2020-10-22

## 2019-10-24 RX ORDER — CLONAZEPAM 0.5 MG/1
TABLET ORAL
Qty: 60 TABLET | Refills: 5 | Status: CANCELLED | OUTPATIENT
Start: 2019-10-23

## 2019-10-24 NOTE — PROGRESS NOTES
Patient Name: Raffy Rutherford Jr.  MRN: [id number]    CC: Headaches     HPI: Raffy Rutherford Jr. is a 69 y.o. male with medical history of depression on lamictal, selegiline / lumbar DDD s/p L3-L5 fusion surgery, with recurrent disc extrusion with medical management with Neurontin, Lyrica, tramadol presenting for further evaluation and management of headaches.     Headache:  Onset: Since 2017   Quality/Character: Left sided fronto - temporal / Throbbing; forehead / No aura / No photosensitivity / + phonosensitivity / + nausea / - intermittent tinnitus / -  vomiting / No complex focal sensory or motor deficits / Added symptoms of nasal congestion and lacrimation   Duration: 4 hrs to max of 2 days   Frequency: Average 4 / week   Intensity: Average 4/10 - Max 8/10   Triggers: Stress related job / family etc   Medications: Abortive regimen - Fioricet / Excedrin / Ceflay. Preventive regimen: Background of  Neurontin, Lyrica, tramadol    Headache Triggers:   Bright or flickering light +   Strong smell- cut grass, perfume   Fatigue +   Lack of sleep +   Vigorous exercise   Dietary factors   Visual strain   Weather changes +   Exertion   Heat (hot weather, hot baths or showers) +     Pertinent past medical history:   - TBI: None   - Neck trauma: None  - Tension headaches / Cluster headaches / Occipital neuralgia / Other headache syndromes: None   - Depression + / anxiety - / bipolar - / schizophrenia -    - Seizures: None    - Stroke: None   - Motion sickness : None     Pertinent investigations:   MRI brain: 2016:   Impression      Age-appropriate generalized volume loss with few scattered foci of T2/FLAIR signal abnormality throughout the supratentorial white matter  while nonspecific suggest for a mild degree of  chronic microvascular ischemic change.       Family history:   - Migraines / Tension headaches / Cluster headaches / Occipital neuralgia / Other headache syndromes: None        ROS:   Review of Systems    Constitutional:  Negative for weight loss.   HENT: Negative for hearing loss.   Eyes: Negative for blurred vision and double vision.   Respiratory: Negative for shortness of breath and stridor.   Cardiovascular: Negative for chest pain and palpitations.   Gastrointestinal: Negative for nausea, vomiting and constipation.   Genitourinary: Negative for frequency. Negative for urgency.   Musculoskeletal: Back pain / Left sided sciatica  Skin: Negative for rash.   Neurological: Negative for dizziness and tremors. Left sided sciatica  Endo/Heme/Allergies: Does not bruise/bleed easily.   Psychiatric/Behavioral: Negative for memory loss. Negative for hallucinations. The patient is not nervous/anxious.      Past Medical History  Past Medical History:   Diagnosis Date    Adenomatous polyp 2003    Adenomatous polyp     Allergy     Arthritis     Back pain     after trauma beginning in 195    Cataract     Chronic pain     neck and left shoulder    Cluster headache 2013    Colon polyp     Degenerative disc disease     Depression     Diverticulitis 12/2013    Elevated PSA     Fibromyalgia 2013    GERD (gastroesophageal reflux disease)     Hepatitis 1970's    A    History of prostate biopsy 2002    Hyperlipidemia     Joint pain     Sleep apnea     Special screening for malignant neoplasms, colon 5/5/2014    Thyroid nodule 7/16/2014    Visual disturbance 2012    problems after cataract surgery       Medications    Current Outpatient Medications:     celecoxib (CELEBREX) 200 MG capsule, Take 1 capsule (200 mg total) by mouth 2 (two) times daily., Disp: 60 capsule, Rfl: 0    clonazePAM (KLONOPIN) 0.5 MG tablet, Take up to 2 tablets by mouth every night at bedtime, Disp: 60 tablet, Rfl: 5    diclofenac sodium (VOLTAREN) 1 % Gel, Apply 2 g topically once daily., Disp: 100 g, Rfl: 0    HYDROcodone-acetaminophen (NORCO) 7.5-325 mg per tablet, Take 1 tablet by mouth every 12 (twelve) hours as needed for Pain.,  Disp: 60 tablet, Rfl: 0    lamoTRIgine (LAMICTAL) 200 MG tablet, take 1 tablet by mouth every day, Disp: 90 tablet, Rfl: 3    RABEprazole (ACIPHEX) 20 mg tablet, Take 1 tablet (20 mg total) by mouth 2 (two) times daily., Disp: 180 tablet, Rfl: 3    selegiline (EMSAM) 12 mg/24 hr, Place onto the skin once daily., Disp: 90 patch, Rfl: 3    selegiline (EMSAM) 12 mg/24 hr, Place onto the skin once daily, Disp: 90 patch, Rfl: 3    senna-docusate 8.6-50 mg (PERICOLACE) 8.6-50 mg per tablet, Take 2 tablets by mouth nightly as needed for Constipation., Disp: , Rfl:     sucralfate (CARAFATE) 1 gram tablet, TAKE 1 TABLET BY MOUTH 4 TIMES A DAY, Disp: 120 tablet, Rfl: 4    traZODone (DESYREL) 100 MG tablet, take 1 tablet by mouth every night at bedtime, Disp: 90 tablet, Rfl: 3    VOLTAREN 1 % Gel, , Disp: , Rfl:     [START ON 11/21/2019] galcanezumab-gnlm (EMGALITY PEN) 120 mg/mL PnIj, Inject 120 mg into the skin every 28 days., Disp: 2 mL, Rfl: 2    HYDROcodone-acetaminophen (NORCO) 5-325 mg per tablet, Take 1 tablet by mouth every 8 (eight) hours as needed for Pain (moderate to severe)., Disp: 21 tablet, Rfl: 0    hydrocortisone 2.5 % cream, Apply topically 2 (two) times daily. for 10 days, Disp: 20 g, Rfl: 0    indomethacin (INDOCIN) 25 MG capsule, Take 1 capsule (25 mg total) by mouth 3 (three) times daily as needed., Disp: 30 capsule, Rfl: 0    methylPREDNISolone (MEDROL DOSEPACK) 4 mg tablet, use as directed, Disp: 1 Package, Rfl: 0    pravastatin (PRAVACHOL) 40 MG tablet, Take 1 tablet (40 mg total) by mouth once daily., Disp: 90 tablet, Rfl: 3    selegiline (EMSAM) 12 mg/24 hr, Place 1 patch onto the skin once daily., Disp: 90 patch, Rfl: 3    Allergies  Review of patient's allergies indicates:   Allergen Reactions    Alphagan [brimonidine]      Patient taking MASO-B Selective Inhibitor Selegiline (Emsam)    Coumadin [warfarin]      itch    Oxycodone      hiccups    Pennsaid [diclofenac sodium]         Social History  Social History     Socioeconomic History    Marital status:      Spouse name: Not on file    Number of children: Not on file    Years of education: Not on file    Highest education level: Not on file   Occupational History    Occupation: Psychotherpist    Social Needs    Financial resource strain: Not on file    Food insecurity:     Worry: Not on file     Inability: Not on file    Transportation needs:     Medical: Not on file     Non-medical: Not on file   Tobacco Use    Smoking status: Never Smoker    Smokeless tobacco: Never Used   Substance and Sexual Activity    Alcohol use: Yes     Comment: occasion    Drug use: No    Sexual activity: Yes     Partners: Female   Lifestyle    Physical activity:     Days per week: Not on file     Minutes per session: Not on file    Stress: Not on file   Relationships    Social connections:     Talks on phone: Not on file     Gets together: Not on file     Attends Temple service: Not on file     Active member of club or organization: Not on file     Attends meetings of clubs or organizations: Not on file     Relationship status: Not on file   Other Topics Concern    Not on file   Social History Narrative    Not on file       Family History  Family History   Problem Relation Age of Onset    Cancer Mother         colon; uterine    Nephrolithiasis Mother     Stroke Mother 86    Pancreatitis Brother     Vision loss Brother         macular degeneration    Diabetes Brother     Macular degeneration Brother     Migraines Sister     No Known Problems Father     No Known Problems Maternal Grandmother     No Known Problems Maternal Grandfather     No Known Problems Paternal Grandmother     No Known Problems Paternal Grandfather     No Known Problems Sister     No Known Problems Maternal Aunt     No Known Problems Maternal Uncle     No Known Problems Paternal Aunt     No Known Problems Paternal Uncle     Melanoma Neg  "Hx     Amblyopia Neg Hx     Blindness Neg Hx     Cataracts Neg Hx     Glaucoma Neg Hx     Hypertension Neg Hx     Retinal detachment Neg Hx     Strabismus Neg Hx     Thyroid disease Neg Hx     Allergic rhinitis Neg Hx     Allergies Neg Hx     Angioedema Neg Hx     Asthma Neg Hx     Eczema Neg Hx     Urticaria Neg Hx     Rhinitis Neg Hx     Immunodeficiency Neg Hx     Atopy Neg Hx        Physical Exam  /68   Pulse 62   Ht 5' 8" (1.727 m)   Wt 78.9 kg (174 lb)   BMI 26.46 kg/m²     General appearance: Well-developed, well-groomed.     Neurologic Exam: The patient is awake, alert and oriented. Language is fluent. Recent and remote memory are normal. Attention span and concentration are normal. Fund of knowledge is appropriate.     Cranial nerves: pupils are round and reactive to light and accommodation. Visual fields are full to confrontation. Ocular motility is full in all cardinal positions of gaze. Facial sensation is normal to pinprick and light touch. Facial activation is symmetric. Hearing is normal bilaterally. Palate elevates symmetrically and gag reflex is intact bilaterally. Shoulder elevation is symmetric and full strength bilaterally. Tongue is midline and neck rotation strength is normal bilaterally. Neck range of motion is normal.     Motor examination of all extremities demonstrates normal bulk and tone in all four limbs. There are no atrophy or fasciculations. Affect in hip flexors / knee extensors on left > right     Sensory examination is normal to pinprick, vibration and proprioception in the upper and lower extremities bilaterally. Romberg is negative.    Deep tendon reflexes are asymmetric in the lower extremities - brisk on left. Toes are mute bilaterally.     Gait: Antalgic    Coordination: Finger to nose and heel to shin testing is normal in both upper and lower extremities. Rapid alternating movements are normal in both upper and lower extremities.     General " exam  Cardiovascular: regular rate and rhythm with no murmurs, rubs or gallops. There are no carotid or vertebral artery bruits. Pulses in both upper and lower extremities are symmetric. There is no peripheral edema.   Head and neck: no cervical lymphadenopathy      Lab and Test Results    WBC   Date Value Ref Range Status   10/17/2019 9.71 3.90 - 12.70 K/uL Final   12/17/2018 11.20 3.90 - 12.70 K/uL Final   10/15/2018 6.48 3.90 - 12.70 K/uL Final     Hemoglobin   Date Value Ref Range Status   10/17/2019 9.6 (L) 14.0 - 18.0 g/dL Final   12/17/2018 16.8 14.0 - 18.0 g/dL Final   10/15/2018 15.4 14.0 - 18.0 g/dL Final     Hematocrit   Date Value Ref Range Status   10/17/2019 30.7 (L) 40.0 - 54.0 % Final   12/17/2018 50.6 40.0 - 54.0 % Final   10/15/2018 46.1 40.0 - 54.0 % Final     Platelets   Date Value Ref Range Status   10/17/2019 303 150 - 350 K/uL Final   12/17/2018 236 150 - 350 K/uL Final   10/15/2018 225 150 - 350 K/uL Final     Glucose   Date Value Ref Range Status   02/25/2019 79 70 - 110 mg/dL Final   12/17/2018 138 (H) 70 - 110 mg/dL Final   10/15/2018 105 70 - 110 mg/dL Final     Sodium   Date Value Ref Range Status   02/25/2019 138 136 - 145 mmol/L Final   12/17/2018 136 136 - 145 mmol/L Final   10/15/2018 139 136 - 145 mmol/L Final     Potassium   Date Value Ref Range Status   02/25/2019 3.8 3.5 - 5.1 mmol/L Final   12/17/2018 4.0 3.5 - 5.1 mmol/L Final   10/15/2018 4.4 3.5 - 5.1 mmol/L Final     Chloride   Date Value Ref Range Status   02/25/2019 100 95 - 110 mmol/L Final   12/17/2018 98 95 - 110 mmol/L Final   10/15/2018 102 95 - 110 mmol/L Final     CO2   Date Value Ref Range Status   02/25/2019 30 (H) 23 - 29 mmol/L Final   12/17/2018 27 23 - 29 mmol/L Final   10/15/2018 26 23 - 29 mmol/L Final     BUN, Bld   Date Value Ref Range Status   02/25/2019 16 8 - 23 mg/dL Final   12/17/2018 19 8 - 23 mg/dL Final   10/15/2018 15 8 - 23 mg/dL Final     Creatinine   Date Value Ref Range Status   10/17/2019 0.7  0.5 - 1.4 mg/dL Final   02/25/2019 0.8 0.5 - 1.4 mg/dL Final   12/17/2018 0.8 0.5 - 1.4 mg/dL Final     Calcium   Date Value Ref Range Status   02/25/2019 10.0 8.7 - 10.5 mg/dL Final   12/17/2018 9.7 8.7 - 10.5 mg/dL Final   10/15/2018 9.3 8.7 - 10.5 mg/dL Final     Magnesium   Date Value Ref Range Status   02/25/2019 2.4 1.6 - 2.6 mg/dL Final     Alkaline Phosphatase   Date Value Ref Range Status   02/25/2019 51 (L) 55 - 135 U/L Final   12/17/2018 44 (L) 55 - 135 U/L Final   10/15/2018 42 (L) 55 - 135 U/L Final     ALT   Date Value Ref Range Status   02/25/2019 28 10 - 44 U/L Final   12/17/2018 37 10 - 44 U/L Final   10/15/2018 25 10 - 44 U/L Final     AST   Date Value Ref Range Status   02/25/2019 22 10 - 40 U/L Final   12/17/2018 28 10 - 40 U/L Final   10/15/2018 20 10 - 40 U/L Final     Images: Independently reviewed   Other Tests: Independently reviewed     Assessment and Plan    Episodic migraine without status migrainosus, not intractable  Migrainous component of presentation / frequency < 15 per month   Failed preventive: background of AEDs / Antidepressants / tramadol     Plan:  Anti-CGRP / Emgality - Reviewed SE  Continue Neurontin / Lamictal  Abortive regimen: indomethacin trial / at the background of excedrin      Paroxysmal hemicrania  Atypical features; however quality; one-sided ness; with ANS dysautonomia findings concerning    Plan:  Monitor response to indomethacin  Headache diary; lifestyle modifications; sleep n diet hygiene    Low back pain without sciatica  - as per NSGY recommendations     S/P lumbar spinal fusion  - plans for revision  - worsening disc extrusion     Degeneration of lumbar or lumbosacral intervertebral disc  - as per NSGY   - WORSENED DISC EXTRUSION    Depression  - continue selegiline     F/u 6 weeks    Tereso Funez MD  Neurology Resident   [facility] [address] [place] [address]  Pager: [phone]

## 2019-10-25 ENCOUNTER — ANESTHESIA (OUTPATIENT)
Dept: SURGERY | Facility: HOSPITAL | Age: 69
End: 2019-10-25
Payer: COMMERCIAL

## 2019-10-25 ENCOUNTER — HOSPITAL ENCOUNTER (OUTPATIENT)
Facility: HOSPITAL | Age: 69
LOS: 1 days | Discharge: HOME | End: 2019-10-26
Attending: NEUROLOGICAL SURGERY | Admitting: NEUROLOGICAL SURGERY
Payer: COMMERCIAL

## 2019-10-25 ENCOUNTER — ANESTHESIA EVENT (OUTPATIENT)
Dept: SURGERY | Facility: HOSPITAL | Age: 69
End: 2019-10-25
Payer: COMMERCIAL

## 2019-10-25 DIAGNOSIS — M51.16 LUMBAR DISC HERNIATION WITH RADICULOPATHY: ICD-10-CM

## 2019-10-25 DIAGNOSIS — M51.16 LUMBAR DISC HERNIATION WITH RADICULOPATHY: Primary | ICD-10-CM

## 2019-10-25 PROBLEM — G43.909 MIGRAINE WITHOUT STATUS MIGRAINOSUS, NOT INTRACTABLE: Status: ACTIVE | Noted: 2019-10-25

## 2019-10-25 LAB
ANION GAP SERPL CALC-SCNC: 7 MMOL/L (ref 8–16)
BASOPHILS # BLD AUTO: 0.03 K/UL (ref 0–0.2)
BASOPHILS NFR BLD: 0.3 % (ref 0–1.9)
BUN SERPL-MCNC: 14 MG/DL (ref 8–23)
CALCIUM SERPL-MCNC: 8 MG/DL (ref 8.7–10.5)
CHLORIDE SERPL-SCNC: 106 MMOL/L (ref 95–110)
CO2 SERPL-SCNC: 27 MMOL/L (ref 23–29)
CREAT SERPL-MCNC: 0.8 MG/DL (ref 0.5–1.4)
DIFFERENTIAL METHOD: ABNORMAL
EOSINOPHIL # BLD AUTO: 0 K/UL (ref 0–0.5)
EOSINOPHIL NFR BLD: 0.1 % (ref 0–8)
ERYTHROCYTE [DISTWIDTH] IN BLOOD BY AUTOMATED COUNT: 22.6 % (ref 11.5–14.5)
EST. GFR  (AFRICAN AMERICAN): >60 ML/MIN/1.73 M^2
EST. GFR  (NON AFRICAN AMERICAN): >60 ML/MIN/1.73 M^2
GLUCOSE SERPL-MCNC: 124 MG/DL (ref 70–110)
HCT VFR BLD AUTO: 32.8 % (ref 40–54)
HGB BLD-MCNC: 9.8 G/DL (ref 14–18)
IMM GRANULOCYTES # BLD AUTO: 0.04 K/UL (ref 0–0.04)
IMM GRANULOCYTES NFR BLD AUTO: 0.4 % (ref 0–0.5)
LYMPHOCYTES # BLD AUTO: 0.5 K/UL (ref 1–4.8)
LYMPHOCYTES NFR BLD: 5.9 % (ref 18–48)
MCH RBC QN AUTO: 23 PG (ref 27–31)
MCHC RBC AUTO-ENTMCNC: 29.9 G/DL (ref 32–36)
MCV RBC AUTO: 77 FL (ref 82–98)
MONOCYTES # BLD AUTO: 0.1 K/UL (ref 0.3–1)
MONOCYTES NFR BLD: 0.7 % (ref 4–15)
NEUTROPHILS # BLD AUTO: 8.6 K/UL (ref 1.8–7.7)
NEUTROPHILS NFR BLD: 92.6 % (ref 38–73)
NRBC BLD-RTO: 0 /100 WBC
PLATELET # BLD AUTO: 242 K/UL (ref 150–350)
PMV BLD AUTO: 8.7 FL (ref 9.2–12.9)
POTASSIUM SERPL-SCNC: 3.8 MMOL/L (ref 3.5–5.1)
RBC # BLD AUTO: 4.27 M/UL (ref 4.6–6.2)
SODIUM SERPL-SCNC: 140 MMOL/L (ref 136–145)
WBC # BLD AUTO: 9.23 K/UL (ref 3.9–12.7)

## 2019-10-25 PROCEDURE — 36000710: Performed by: NEUROLOGICAL SURGERY

## 2019-10-25 PROCEDURE — 80048 BASIC METABOLIC PNL TOTAL CA: CPT

## 2019-10-25 PROCEDURE — 71000033 HC RECOVERY, INTIAL HOUR: Performed by: NEUROLOGICAL SURGERY

## 2019-10-25 PROCEDURE — 63600175 PHARM REV CODE 636 W HCPCS: Performed by: STUDENT IN AN ORGANIZED HEALTH CARE EDUCATION/TRAINING PROGRAM

## 2019-10-25 PROCEDURE — D9220A PRA ANESTHESIA: ICD-10-PCS | Mod: ,,, | Performed by: ANESTHESIOLOGY

## 2019-10-25 PROCEDURE — 85025 COMPLETE CBC W/AUTO DIFF WBC: CPT

## 2019-10-25 PROCEDURE — 25000003 PHARM REV CODE 250: Performed by: ANESTHESIOLOGY

## 2019-10-25 PROCEDURE — 25000003 PHARM REV CODE 250: Performed by: NURSE ANESTHETIST, CERTIFIED REGISTERED

## 2019-10-25 PROCEDURE — 25000003 PHARM REV CODE 250

## 2019-10-25 PROCEDURE — 63030 PR LAMINOTOMY,LUMBAR DISK,1 INTRSP: ICD-10-PCS | Mod: RT,,, | Performed by: NEUROLOGICAL SURGERY

## 2019-10-25 PROCEDURE — 25000003 PHARM REV CODE 250: Performed by: STUDENT IN AN ORGANIZED HEALTH CARE EDUCATION/TRAINING PROGRAM

## 2019-10-25 PROCEDURE — 63600175 PHARM REV CODE 636 W HCPCS: Performed by: PHYSICIAN ASSISTANT

## 2019-10-25 PROCEDURE — D9220A PRA ANESTHESIA: Mod: ,,, | Performed by: ANESTHESIOLOGY

## 2019-10-25 PROCEDURE — 63600175 PHARM REV CODE 636 W HCPCS: Performed by: NEUROLOGICAL SURGERY

## 2019-10-25 PROCEDURE — 63030 LAMOT DCMPRN NRV RT 1 LMBR: CPT | Mod: RT,,, | Performed by: NEUROLOGICAL SURGERY

## 2019-10-25 PROCEDURE — 63600175 PHARM REV CODE 636 W HCPCS: Performed by: NURSE ANESTHETIST, CERTIFIED REGISTERED

## 2019-10-25 PROCEDURE — 25000003 PHARM REV CODE 250: Performed by: PHYSICIAN ASSISTANT

## 2019-10-25 PROCEDURE — 37000009 HC ANESTHESIA EA ADD 15 MINS: Performed by: NEUROLOGICAL SURGERY

## 2019-10-25 PROCEDURE — 25000003 PHARM REV CODE 250: Performed by: NEUROLOGICAL SURGERY

## 2019-10-25 PROCEDURE — 63600175 PHARM REV CODE 636 W HCPCS: Performed by: ANESTHESIOLOGY

## 2019-10-25 PROCEDURE — 37000008 HC ANESTHESIA 1ST 15 MINUTES: Performed by: NEUROLOGICAL SURGERY

## 2019-10-25 PROCEDURE — 36000711: Performed by: NEUROLOGICAL SURGERY

## 2019-10-25 RX ORDER — CELECOXIB 200 MG/1
200 CAPSULE ORAL ONCE
Status: COMPLETED | OUTPATIENT
Start: 2019-10-25 | End: 2019-10-25

## 2019-10-25 RX ORDER — HYDROCODONE BITARTRATE AND ACETAMINOPHEN 10; 325 MG/1; MG/1
TABLET ORAL
Status: COMPLETED
Start: 2019-10-25 | End: 2019-10-25

## 2019-10-25 RX ORDER — TRAZODONE HYDROCHLORIDE 100 MG/1
100 TABLET ORAL NIGHTLY PRN
Status: DISCONTINUED | OUTPATIENT
Start: 2019-10-25 | End: 2019-10-26 | Stop reason: HOSPADM

## 2019-10-25 RX ORDER — AMOXICILLIN 250 MG
2 CAPSULE ORAL NIGHTLY PRN
Status: DISCONTINUED | OUTPATIENT
Start: 2019-10-25 | End: 2019-10-26 | Stop reason: HOSPADM

## 2019-10-25 RX ORDER — HYDROCODONE BITARTRATE AND ACETAMINOPHEN 10; 325 MG/1; MG/1
1 TABLET ORAL EVERY 6 HOURS PRN
Status: DISCONTINUED | OUTPATIENT
Start: 2019-10-25 | End: 2019-10-25 | Stop reason: HOSPADM

## 2019-10-25 RX ORDER — ONDANSETRON 8 MG/1
8 TABLET, ORALLY DISINTEGRATING ORAL EVERY 6 HOURS PRN
Status: DISCONTINUED | OUTPATIENT
Start: 2019-10-25 | End: 2019-10-26 | Stop reason: HOSPADM

## 2019-10-25 RX ORDER — ACETAMINOPHEN 500 MG
1000 TABLET ORAL ONCE
Status: COMPLETED | OUTPATIENT
Start: 2019-10-25 | End: 2019-10-25

## 2019-10-25 RX ORDER — BUTALBITAL, ACETAMINOPHEN AND CAFFEINE 50; 325; 40 MG/1; MG/1; MG/1
1 TABLET ORAL
COMMUNITY
End: 2019-10-31 | Stop reason: SDUPTHER

## 2019-10-25 RX ORDER — MUPIROCIN 20 MG/G
OINTMENT TOPICAL
Status: DISCONTINUED | OUTPATIENT
Start: 2019-10-25 | End: 2019-10-25 | Stop reason: HOSPADM

## 2019-10-25 RX ORDER — TRAMADOL HYDROCHLORIDE 50 MG/1
50 TABLET ORAL EVERY 4 HOURS PRN
Status: DISCONTINUED | OUTPATIENT
Start: 2019-10-25 | End: 2019-10-26 | Stop reason: HOSPADM

## 2019-10-25 RX ORDER — LIDOCAINE HCL/PF 100 MG/5ML
SYRINGE (ML) INTRAVENOUS
Status: DISCONTINUED | OUTPATIENT
Start: 2019-10-25 | End: 2019-10-25

## 2019-10-25 RX ORDER — BUPIVACAINE HYDROCHLORIDE AND EPINEPHRINE 5; 5 MG/ML; UG/ML
INJECTION, SOLUTION EPIDURAL; INTRACAUDAL; PERINEURAL
Status: DISCONTINUED | OUTPATIENT
Start: 2019-10-25 | End: 2019-10-25 | Stop reason: HOSPADM

## 2019-10-25 RX ORDER — HYDROCODONE BITARTRATE AND ACETAMINOPHEN 5; 325 MG/1; MG/1
1 TABLET ORAL EVERY 6 HOURS PRN
Status: DISCONTINUED | OUTPATIENT
Start: 2019-10-25 | End: 2019-10-26 | Stop reason: HOSPADM

## 2019-10-25 RX ORDER — SODIUM CHLORIDE 0.9 % (FLUSH) 0.9 %
10 SYRINGE (ML) INJECTION
Status: DISCONTINUED | OUTPATIENT
Start: 2019-10-25 | End: 2019-10-25 | Stop reason: HOSPADM

## 2019-10-25 RX ORDER — PROPOFOL 10 MG/ML
VIAL (ML) INTRAVENOUS
Status: DISCONTINUED | OUTPATIENT
Start: 2019-10-25 | End: 2019-10-25

## 2019-10-25 RX ORDER — HYDROMORPHONE HYDROCHLORIDE 1 MG/ML
1 INJECTION, SOLUTION INTRAMUSCULAR; INTRAVENOUS; SUBCUTANEOUS EVERY 4 HOURS PRN
Status: DISCONTINUED | OUTPATIENT
Start: 2019-10-25 | End: 2019-10-26 | Stop reason: HOSPADM

## 2019-10-25 RX ORDER — FENTANYL CITRATE 50 UG/ML
INJECTION, SOLUTION INTRAMUSCULAR; INTRAVENOUS
Status: DISCONTINUED | OUTPATIENT
Start: 2019-10-25 | End: 2019-10-25

## 2019-10-25 RX ORDER — LAMOTRIGINE 100 MG/1
200 TABLET ORAL DAILY
Status: DISCONTINUED | OUTPATIENT
Start: 2019-10-26 | End: 2019-10-26 | Stop reason: HOSPADM

## 2019-10-25 RX ORDER — ONDANSETRON 2 MG/ML
4 INJECTION INTRAMUSCULAR; INTRAVENOUS DAILY PRN
Status: DISCONTINUED | OUTPATIENT
Start: 2019-10-25 | End: 2019-10-25 | Stop reason: HOSPADM

## 2019-10-25 RX ORDER — VANCOMYCIN HCL IN 5 % DEXTROSE 1G/250ML
1000 PLASTIC BAG, INJECTION (ML) INTRAVENOUS ONCE
Status: COMPLETED | OUTPATIENT
Start: 2019-10-25 | End: 2019-10-25

## 2019-10-25 RX ORDER — HEPARIN SODIUM 5000 [USP'U]/ML
5000 INJECTION, SOLUTION INTRAVENOUS; SUBCUTANEOUS EVERY 8 HOURS
Status: DISCONTINUED | OUTPATIENT
Start: 2019-10-26 | End: 2019-10-26 | Stop reason: HOSPADM

## 2019-10-25 RX ORDER — MIDAZOLAM HYDROCHLORIDE 1 MG/ML
INJECTION, SOLUTION INTRAMUSCULAR; INTRAVENOUS
Status: DISCONTINUED | OUTPATIENT
Start: 2019-10-25 | End: 2019-10-25

## 2019-10-25 RX ORDER — CELECOXIB 200 MG/1
200 CAPSULE ORAL 2 TIMES DAILY
Status: DISCONTINUED | OUTPATIENT
Start: 2019-10-25 | End: 2019-10-26 | Stop reason: HOSPADM

## 2019-10-25 RX ORDER — OXYCODONE HYDROCHLORIDE 10 MG/1
10 TABLET ORAL ONCE
Status: DISCONTINUED | OUTPATIENT
Start: 2019-10-25 | End: 2019-10-26 | Stop reason: HOSPADM

## 2019-10-25 RX ORDER — PANTOPRAZOLE SODIUM 40 MG/1
40 TABLET, DELAYED RELEASE ORAL DAILY
Status: DISCONTINUED | OUTPATIENT
Start: 2019-10-26 | End: 2019-10-26 | Stop reason: HOSPADM

## 2019-10-25 RX ORDER — MAG HYDROX/ALUMINUM HYD/SIMETH 200-200-20
30 SUSPENSION, ORAL (FINAL DOSE FORM) ORAL EVERY 4 HOURS PRN
Status: DISCONTINUED | OUTPATIENT
Start: 2019-10-25 | End: 2019-10-26 | Stop reason: HOSPADM

## 2019-10-25 RX ORDER — METHOCARBAMOL 500 MG/1
500 TABLET, FILM COATED ORAL 4 TIMES DAILY
Status: DISCONTINUED | OUTPATIENT
Start: 2019-10-26 | End: 2019-10-26 | Stop reason: HOSPADM

## 2019-10-25 RX ORDER — DEXAMETHASONE SODIUM PHOSPHATE 4 MG/ML
INJECTION, SOLUTION INTRA-ARTICULAR; INTRALESIONAL; INTRAMUSCULAR; INTRAVENOUS; SOFT TISSUE
Status: DISCONTINUED | OUTPATIENT
Start: 2019-10-25 | End: 2019-10-25

## 2019-10-25 RX ORDER — PRAVASTATIN SODIUM 40 MG/1
40 TABLET ORAL DAILY
Status: DISCONTINUED | OUTPATIENT
Start: 2019-10-26 | End: 2019-10-26 | Stop reason: HOSPADM

## 2019-10-25 RX ORDER — MUPIROCIN 20 MG/G
1 OINTMENT TOPICAL 2 TIMES DAILY
Status: DISCONTINUED | OUTPATIENT
Start: 2019-10-25 | End: 2019-10-26 | Stop reason: HOSPADM

## 2019-10-25 RX ORDER — KETAMINE HCL IN 0.9 % NACL 50 MG/5 ML
SYRINGE (ML) INTRAVENOUS
Status: DISCONTINUED | OUTPATIENT
Start: 2019-10-25 | End: 2019-10-25

## 2019-10-25 RX ORDER — HYDROMORPHONE HYDROCHLORIDE 1 MG/ML
0.2 INJECTION, SOLUTION INTRAMUSCULAR; INTRAVENOUS; SUBCUTANEOUS EVERY 5 MIN PRN
Status: DISCONTINUED | OUTPATIENT
Start: 2019-10-25 | End: 2019-10-25 | Stop reason: HOSPADM

## 2019-10-25 RX ORDER — CYCLOBENZAPRINE HCL 10 MG
10 TABLET ORAL ONCE
Status: COMPLETED | OUTPATIENT
Start: 2019-10-25 | End: 2019-10-25

## 2019-10-25 RX ORDER — MUPIROCIN 20 MG/G
1 OINTMENT TOPICAL
Status: COMPLETED | OUTPATIENT
Start: 2019-10-25 | End: 2019-10-25

## 2019-10-25 RX ORDER — SODIUM CHLORIDE 9 MG/ML
INJECTION, SOLUTION INTRAVENOUS CONTINUOUS PRN
Status: DISCONTINUED | OUTPATIENT
Start: 2019-10-25 | End: 2019-10-25

## 2019-10-25 RX ORDER — BACITRACIN 50000 [IU]/1
INJECTION, POWDER, FOR SOLUTION INTRAMUSCULAR
Status: DISCONTINUED | OUTPATIENT
Start: 2019-10-25 | End: 2019-10-25 | Stop reason: HOSPADM

## 2019-10-25 RX ORDER — CEFAZOLIN SODIUM 1 G/3ML
2 INJECTION, POWDER, FOR SOLUTION INTRAMUSCULAR; INTRAVENOUS
Status: COMPLETED | OUTPATIENT
Start: 2019-10-25 | End: 2019-10-26

## 2019-10-25 RX ORDER — EPHEDRINE SULFATE 50 MG/ML
INJECTION, SOLUTION INTRAVENOUS
Status: DISCONTINUED | OUTPATIENT
Start: 2019-10-25 | End: 2019-10-25

## 2019-10-25 RX ORDER — ROCURONIUM BROMIDE 10 MG/ML
INJECTION, SOLUTION INTRAVENOUS
Status: DISCONTINUED | OUTPATIENT
Start: 2019-10-25 | End: 2019-10-25

## 2019-10-25 RX ORDER — GENTAMICIN SULFATE 80 MG/100ML
80 INJECTION, SOLUTION INTRAVENOUS ONCE
Status: COMPLETED | OUTPATIENT
Start: 2019-10-25 | End: 2019-10-25

## 2019-10-25 RX ORDER — CLONAZEPAM 0.5 MG/1
0.5 TABLET ORAL NIGHTLY
Status: DISCONTINUED | OUTPATIENT
Start: 2019-10-25 | End: 2019-10-26 | Stop reason: HOSPADM

## 2019-10-25 RX ORDER — BISACODYL 10 MG
10 SUPPOSITORY, RECTAL RECTAL DAILY
Status: DISCONTINUED | OUTPATIENT
Start: 2019-10-26 | End: 2019-10-26 | Stop reason: HOSPADM

## 2019-10-25 RX ORDER — SODIUM CHLORIDE 9 MG/ML
INJECTION, SOLUTION INTRAVENOUS CONTINUOUS
Status: DISCONTINUED | OUTPATIENT
Start: 2019-10-25 | End: 2019-10-26 | Stop reason: HOSPADM

## 2019-10-25 RX ADMIN — SODIUM CHLORIDE: 0.9 INJECTION, SOLUTION INTRAVENOUS at 10:10

## 2019-10-25 RX ADMIN — HYDROMORPHONE HYDROCHLORIDE 0.2 MG: 1 INJECTION, SOLUTION INTRAMUSCULAR; INTRAVENOUS; SUBCUTANEOUS at 09:10

## 2019-10-25 RX ADMIN — ACETAMINOPHEN 1000 MG: 500 TABLET ORAL at 05:10

## 2019-10-25 RX ADMIN — LIDOCAINE HYDROCHLORIDE 100 MG: 20 INJECTION, SOLUTION INTRAVENOUS at 06:10

## 2019-10-25 RX ADMIN — HYDROCODONE BITARTRATE AND ACETAMINOPHEN 1 TABLET: 10; 325 TABLET ORAL at 04:10

## 2019-10-25 RX ADMIN — CLONAZEPAM 0.5 MG: 0.5 TABLET ORAL at 10:10

## 2019-10-25 RX ADMIN — EPHEDRINE SULFATE 10 MG: 50 INJECTION, SOLUTION INTRAMUSCULAR; INTRAVENOUS; SUBCUTANEOUS at 08:10

## 2019-10-25 RX ADMIN — MUPIROCIN 1 G: 20 OINTMENT TOPICAL at 05:10

## 2019-10-25 RX ADMIN — SODIUM CHLORIDE, SODIUM GLUCONATE, SODIUM ACETATE, POTASSIUM CHLORIDE, MAGNESIUM CHLORIDE, SODIUM PHOSPHATE, DIBASIC, AND POTASSIUM PHOSPHATE: .53; .5; .37; .037; .03; .012; .00082 INJECTION, SOLUTION INTRAVENOUS at 08:10

## 2019-10-25 RX ADMIN — DEXAMETHASONE SODIUM PHOSPHATE 8 MG: 4 INJECTION, SOLUTION INTRAMUSCULAR; INTRAVENOUS at 06:10

## 2019-10-25 RX ADMIN — HYDROCODONE BITARTRATE AND ACETAMINOPHEN 1 TABLET: 10; 325 TABLET ORAL at 10:10

## 2019-10-25 RX ADMIN — CYCLOBENZAPRINE HYDROCHLORIDE 10 MG: 10 TABLET, FILM COATED ORAL at 05:10

## 2019-10-25 RX ADMIN — PREGABALIN 75 MG: 25 CAPSULE ORAL at 05:10

## 2019-10-25 RX ADMIN — EPHEDRINE SULFATE 5 MG: 50 INJECTION, SOLUTION INTRAMUSCULAR; INTRAVENOUS; SUBCUTANEOUS at 07:10

## 2019-10-25 RX ADMIN — ROCURONIUM BROMIDE 50 MG: 10 INJECTION, SOLUTION INTRAVENOUS at 06:10

## 2019-10-25 RX ADMIN — SODIUM CHLORIDE, SODIUM GLUCONATE, SODIUM ACETATE, POTASSIUM CHLORIDE, MAGNESIUM CHLORIDE, SODIUM PHOSPHATE, DIBASIC, AND POTASSIUM PHOSPHATE: .53; .5; .37; .037; .03; .012; .00082 INJECTION, SOLUTION INTRAVENOUS at 06:10

## 2019-10-25 RX ADMIN — EPHEDRINE SULFATE 10 MG: 50 INJECTION, SOLUTION INTRAMUSCULAR; INTRAVENOUS; SUBCUTANEOUS at 07:10

## 2019-10-25 RX ADMIN — GENTAMICIN SULFATE 80 MG: 80 INJECTION, SOLUTION INTRAVENOUS at 07:10

## 2019-10-25 RX ADMIN — FENTANYL CITRATE 100 MCG: 50 INJECTION, SOLUTION INTRAMUSCULAR; INTRAVENOUS at 06:10

## 2019-10-25 RX ADMIN — HYDROMORPHONE HYDROCHLORIDE 0.2 MG: 1 INJECTION, SOLUTION INTRAMUSCULAR; INTRAVENOUS; SUBCUTANEOUS at 10:10

## 2019-10-25 RX ADMIN — MUPIROCIN 1 G: 20 OINTMENT TOPICAL at 10:10

## 2019-10-25 RX ADMIN — CELECOXIB 200 MG: 200 CAPSULE ORAL at 05:10

## 2019-10-25 RX ADMIN — PROPOFOL 150 MG: 10 INJECTION, EMULSION INTRAVENOUS at 06:10

## 2019-10-25 RX ADMIN — VANCOMYCIN HYDROCHLORIDE 1000 MG: 1 INJECTION, POWDER, FOR SOLUTION INTRAVENOUS at 05:10

## 2019-10-25 RX ADMIN — Medication 30 MG: at 07:10

## 2019-10-25 RX ADMIN — CELECOXIB 200 MG: 200 CAPSULE ORAL at 10:10

## 2019-10-25 RX ADMIN — CEFAZOLIN 2 G: 1 INJECTION, POWDER, FOR SOLUTION INTRAMUSCULAR; INTRAVENOUS at 10:10

## 2019-10-25 RX ADMIN — SODIUM CHLORIDE: 0.9 INJECTION, SOLUTION INTRAVENOUS at 05:10

## 2019-10-25 RX ADMIN — MIDAZOLAM HYDROCHLORIDE 2 MG: 1 INJECTION, SOLUTION INTRAMUSCULAR; INTRAVENOUS at 06:10

## 2019-10-25 NOTE — ANESTHESIA PREPROCEDURE EVALUATION
10/25/2019  Raffy Rutherford Jr. is a 69 y.o., male presenting for lumbar spine surgery.    Past Medical History:   Diagnosis Date    Adenomatous polyp 2003    Adenomatous polyp     Allergy     Arthritis     Back pain     after trauma beginning in 195    Cataract     Chronic pain     neck and left shoulder    Cluster headache 2013    Colon polyp     Degenerative disc disease     Depression     Diverticulitis 12/2013    Elevated PSA     Fibromyalgia 2013    GERD (gastroesophageal reflux disease)     Hepatitis 1970's    A    History of prostate biopsy 2002    Hyperlipidemia     Joint pain     Sleep apnea     Special screening for malignant neoplasms, colon 5/5/2014    Thyroid nodule 7/16/2014    Visual disturbance 2012    problems after cataract surgery     Past Surgical History:   Procedure Laterality Date    BACK SURGERY      CATARACT EXTRACTION W/  INTRAOCULAR LENS IMPLANT Bilateral     CHOLECYSTECTOMY      COSMETIC SURGERY  2/10/2015    Direct mid-forehead brow lift    COSMETIC SURGERY  2/10/2015    Bilateral upper lid blepahroplasty    EYE SURGERY      HEMORRHOID SURGERY      with complication of chronic bleeding for 6 weeks     INJECTION OF STEROID Right 12/6/2018    Procedure: INJECTION, STEROID Right SI Joint Block and Steroid Injection;  Surgeon: Jason Caldwell MD;  Location: [facility] OR;  Service: Neurosurgery;  Laterality: Right;  Procedure: Right SI Joint Block and Steroid Injection  Surgery Time: 30 MIN  LOS: 0  Anesthesia: MAC  Radiology: C-arm  Bed: [place] [id number] Poster  Position: Prone    INJECTION OF STEROID Right 9/19/2019    Procedure: INJECTION, STEROID Procedure: Right SI joint block nd steroid injection;  Surgeon: Jason Caldwell MD;  Location: [facility] OR;  Service: Neurosurgery;  Laterality: Right;  Procedure: Right SI joint block nd steroid injection  Surgery Time:  30 mins  LOS:   Anesthesia: General MAC  Radiology:C-arm  Bed: Regular Bed  Position: Prone    JOINT REPLACEMENT      KNEE SURGERY      involving arthroscopic surgery to both knees    SINUS SURGERY      SINUS SURGERY      left molar and sinus surgery for trigeminal neuralgia    SPINAL FUSION  6/22/2015    L3-L5 XLIF    SPINE SURGERY  6/22/15    XLIF/TANA    TOTAL HIP ARTHROPLASTY  4/2012    Pt states he had total hip replacement on his left hip.     Review of patient's allergies indicates:   Allergen Reactions    Alphagan [brimonidine]      Patient taking MASO-B Selective Inhibitor Selegiline (Emsam)    Coumadin [warfarin]      itch    Oxycodone      hiccups    Pennsaid [diclofenac sodium] Rash     No current facility-administered medications on file prior to encounter.      Current Outpatient Medications on File Prior to Encounter   Medication Sig Dispense Refill    butalbital-acetaminophen-caffeine -40 mg (FIORICET, ESGIC) -40 mg per tablet Take 1 tablet by mouth as needed for Pain.      celecoxib (CELEBREX) 200 MG capsule Take 1 capsule (200 mg total) by mouth 2 (two) times daily. 60 capsule 0    clonazePAM (KLONOPIN) 0.5 MG tablet Take up to 2 tablets by mouth every night at bedtime 60 tablet 5    diclofenac sodium (VOLTAREN) 1 % Gel Apply 2 g topically once daily. 100 g 0    HYDROcodone-acetaminophen (NORCO) 5-325 mg per tablet Take 1 tablet by mouth every 8 (eight) hours as needed for Pain (moderate to severe). 21 tablet 0    lamoTRIgine (LAMICTAL) 200 MG tablet take 1 tablet by mouth every day 90 tablet 3    pravastatin (PRAVACHOL) 40 MG tablet Take 1 tablet (40 mg total) by mouth once daily. 90 tablet 3    RABEprazole (ACIPHEX) 20 mg tablet Take 1 tablet (20 mg total) by mouth 2 (two) times daily. 180 tablet 3    selegiline (EMSAM) 12 mg/24 hr Place 1 patch onto the skin once daily. 90 patch 3    selegiline (EMSAM) 12 mg/24 hr Place onto the skin once daily 90 patch 3     senna-docusate 8.6-50 mg (PERICOLACE) 8.6-50 mg per tablet Take 2 tablets by mouth nightly as needed for Constipation.      sucralfate (CARAFATE) 1 gram tablet TAKE 1 TABLET BY MOUTH 4 TIMES A  tablet 4    traZODone (DESYREL) 100 MG tablet take 1 tablet by mouth every night at bedtime 90 tablet 3    [START ON 11/21/2019] galcanezumab-gnlm (EMGALITY PEN) 120 mg/mL PnIj Inject 120 mg into the skin every 28 days. 2 mL 2    HYDROcodone-acetaminophen (NORCO) 7.5-325 mg per tablet Take 1 tablet by mouth every 12 (twelve) hours as needed for Pain. 60 tablet 0    hydrocortisone 2.5 % cream Apply topically 2 (two) times daily. for 10 days 20 g 0    indomethacin (INDOCIN) 25 MG capsule Take 1 capsule (25 mg total) by mouth 3 (three) times daily as needed. 30 capsule 0    selegiline (EMSAM) 12 mg/24 hr Place onto the skin once daily. 90 patch 3    VOLTAREN 1 % Gel        Lab Results   Component Value Date    WBC 9.71 10/17/2019    HGB 9.6 (L) 10/17/2019    HCT 30.7 (L) 10/17/2019    MCV 77 (L) 10/17/2019     10/17/2019       BMP  Lab Results   Component Value Date     02/25/2019    K 3.8 02/25/2019     02/25/2019    CO2 30 (H) 02/25/2019    BUN 16 02/25/2019    CREATININE 0.7 10/17/2019    CALCIUM 10.0 02/25/2019    ANIONGAP 8 02/25/2019    ESTGFRAFRICA >60 10/17/2019    EGFRNONAA >60 10/17/2019         Anesthesia Evaluation    I have reviewed the Patient Summary Reports.     I have reviewed the Medications.     Review of Systems  Anesthesia Hx:  No problems with previous Anesthesia   Denies Personal Hx of Anesthesia complications.   Cardiovascular:   Exercise tolerance: good Denies CAD.    Denies CABG/stent.     Pulmonary:   Sleep Apnea    Renal/:  Renal/ Normal     Hepatic/GI:   GERD, well controlled Hepatitis, A    Musculoskeletal:   Arthritis     Neurological:   Denies CVA. Denies Seizures.        Physical Exam  General:  Well nourished    Airway/Jaw/Neck:  Airway Findings: Mouth  Opening: Normal Tongue: Normal  General Airway Assessment: Adult  Mallampati: II  TM Distance: Normal, at least 6 cm  Jaw/Neck Findings:  Neck ROM: Normal ROM      Dental:  Dental Findings: In tact        Mental Status:  Mental Status Findings:  Cooperative, Alert and Oriented         Anesthesia Plan  Type of Anesthesia, risks & benefits discussed:  Anesthesia Type:  general  Patient's Preference:   Intra-op Monitoring Plan: standard ASA monitors  Intra-op Monitoring Plan Comments:   Post Op Pain Control Plan: per primary service following discharge from PACU, IV/PO Opioids PRN and multimodal analgesia  Post Op Pain Control Plan Comments:   Induction:   IV  Beta Blocker:  Patient is not currently on a Beta-Blocker (No further documentation required).       Informed Consent: Patient understands risks and agrees with Anesthesia plan.  Questions answered. Anesthesia consent signed with patient.  ASA Score: 2     Day of Surgery Review of History & Physical:    H&P update referred to the surgeon.         Ready For Surgery From Anesthesia Perspective.

## 2019-10-25 NOTE — ASSESSMENT & PLAN NOTE
Atypical features; however quality; one-sided ness; with ANS dysautonomia findings concerning    Plan:  Monitor response to indomethacin  Headache diary; lifestyle modifications; sleep n diet hygiene

## 2019-10-25 NOTE — H&P
NEUROSURGICAL PROGRESS NOTE    DATE OF SERVICE:  10/25/2019    ATTENDING PHYSICIAN:  Jason Caldwell MD    SUBJECTIVE:    INTERIM HISTORY:    This is a very pleasant 69 y.o. male, who is status post L3-5 fusion  And instrumentation in 2015. He has been treated for right SI joint dysfunction since 12/2018 by me. He had a recent right SI joint injection on 09/19/2019 and got significant relief but a few days later his pain went very severe.     An MRI was ordered showing an acute right L5-S1 larege disc herniation compression the right S1 nerve root.     His pain has been severe and constant. He is unable to walk more than 5 minutes because of the pain in his right leg.               PAST MEDICAL HISTORY:  Active Ambulatory Problems     Diagnosis Date Noted    Depression     Hyperlipidemia     Chronic pain     Colon polyp     Adenomatous polyp     History of prostate biopsy     GERD (gastroesophageal reflux disease)     Back pain     Sleep apnea     Visual disturbances 10/03/2012    Spondylosis without myelopathy 11/09/2012    Degeneration of lumbar or lumbosacral intervertebral disc 11/09/2012    Spinal stenosis, lumbar region, without neurogenic claudication 11/09/2012    Thoracic or lumbosacral neuritis or radiculitis, unspecified 11/09/2012    Displacement of lumbar intervertebral disc without myelopathy 11/09/2012    Acquired spondylolisthesis 11/09/2012    Lumbago 11/09/2012    Status post cataract extraction and insertion of intraocular lens - Both Eyes 11/13/2012    Prostatitis, acute 12/17/2012    Fibromyalgia 10/21/2013    OP (osteoporosis) 10/21/2013    Compression fracture of T12 vertebra 10/21/2013    Diverticulitis large intestine 12/21/2013    Osteoarthritis 03/19/2014    Lower back pain 04/01/2014    Lower extremity pain 04/01/2014    Muscle weakness 04/01/2014    Range of motion deficit 04/01/2014    Special screening for malignant neoplasms, colon 05/05/2014     Cervicalgia 06/19/2014    Facet joint disease of cervical region 06/19/2014    Occipital neuralgia 06/19/2014    Chronic migraine without aura, with intractable migraine, so stated, with status migrainosus 06/19/2014    Cervical radiculopathy 06/19/2014    Paroxysmal hemicrania 06/19/2014    Sciatic nerve pain 06/19/2014    Chronic LBP 06/27/2014    DJD (degenerative joint disease) of lumbar spine 06/27/2014    Chronic neck pain 06/27/2014    Right lumbar radiculopathy 06/27/2014    Thyroid nodule 07/16/2014    Carpal tunnel syndrome of right wrist 07/16/2014    Paresthesia 08/01/2014    Brow ptosis 02/10/2015    Degenerative disc disease 06/22/2015    Encounter for postoperative wound check 08/01/2015    Lumbar radiculopathy 09/15/2015    Cervical spondylosis 10/22/2015    Low back pain without sciatica 10/27/2015    Lumbar radicular pain 10/27/2015    S/P lumbar spinal fusion 12/02/2015    Gait abnormality 02/21/2017    Impaired functional mobility, balance, gait, and endurance 02/24/2017    Left hip pain 05/24/2017    Right wrist tendinitis 07/28/2017    Pain in right wrist 07/28/2017    Difficulty walking 11/01/2017    Weakness 11/01/2017    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    Headache around the eyes 06/26/2018    Lumbar back pain with radiculopathy affecting lower extremity 11/15/2018    Closed fracture of left distal radius 02/05/2019    Pain in left wrist 03/19/2019    Bilateral foot-drop 07/16/2019    Idiopathic peripheral neuropathy 07/16/2019    Sacroiliac joint dysfunction of right side 07/16/2019    SI (sacroiliac) joint dysfunction 09/19/2019    Episodic migraine without status migrainosus, not intractable 10/25/2019     Resolved Ambulatory Problems     Diagnosis Date Noted    Blepharitis of both eyes - Both Eyes 11/13/2012     Past Medical History:   Diagnosis Date    Allergy     Arthritis     Cataract     Cluster headache 2013     Diverticulitis 12/2013    Elevated PSA     Hepatitis 1970's    Joint pain     Visual disturbance 2012       PAST SURGICAL HISTORY:  Past Surgical History:   Procedure Laterality Date    BACK SURGERY      CATARACT EXTRACTION W/  INTRAOCULAR LENS IMPLANT Bilateral     CHOLECYSTECTOMY      COSMETIC SURGERY  2/10/2015    Direct mid-forehead brow lift    COSMETIC SURGERY  2/10/2015    Bilateral upper lid blepahroplasty    EYE SURGERY      HEMORRHOID SURGERY      with complication of chronic bleeding for 6 weeks     INJECTION OF STEROID Right 12/6/2018    Procedure: INJECTION, STEROID Right SI Joint Block and Steroid Injection;  Surgeon: Jason Caldwell MD;  Location: [facility] OR;  Service: Neurosurgery;  Laterality: Right;  Procedure: Right SI Joint Block and Steroid Injection  Surgery Time: 30 MIN  LOS: 0  Anesthesia: MAC  Radiology: C-arm  Bed: Dalton 4 Poster  Position: Prone    INJECTION OF STEROID Right 9/19/2019    Procedure: INJECTION, STEROID Procedure: Right SI joint block nd steroid injection;  Surgeon: Jason Caldwell MD;  Location: [facility] OR;  Service: Neurosurgery;  Laterality: Right;  Procedure: Right SI joint block nd steroid injection  Surgery Time: 30 mins  LOS:   Anesthesia: General MAC  Radiology:C-arm  Bed: Regular Bed  Position: Prone    JOINT REPLACEMENT      KNEE SURGERY      involving arthroscopic surgery to both knees    SINUS SURGERY      SINUS SURGERY      left molar and sinus surgery for trigeminal neuralgia    SPINAL FUSION  6/22/2015    L3-L5 XLIF    SPINE SURGERY  6/22/15    XLIF/TANA    TOTAL HIP ARTHROPLASTY  4/2012    Pt states he had total hip replacement on his left hip.       SOCIAL HISTORY:   Social History     Socioeconomic History    Marital status:      Spouse name: Not on file    Number of children: Not on file    Years of education: Not on file    Highest education level: Not on file   Occupational History    Occupation: Psychotherpist     Social Needs    Financial resource strain: Not on file    Food insecurity:     Worry: Not on file     Inability: Not on file    Transportation needs:     Medical: Not on file     Non-medical: Not on file   Tobacco Use    Smoking status: Never Smoker    Smokeless tobacco: Never Used   Substance and Sexual Activity    Alcohol use: Yes     Comment: occasion    Drug use: No    Sexual activity: Yes     Partners: Female   Lifestyle    Physical activity:     Days per week: Not on file     Minutes per session: Not on file    Stress: Not on file   Relationships    Social connections:     Talks on phone: Not on file     Gets together: Not on file     Attends [employer] service: Not on file     Active member of club or organization: Not on file     Attends meetings of clubs or organizations: Not on file     Relationship status: Not on file   Other Topics Concern    Not on file   Social History Narrative    Not on file       FAMILY HISTORY:  Family History   Problem Relation Age of Onset    Cancer Mother         colon; uterine    Nephrolithiasis Mother     Stroke Mother 86    Pancreatitis Brother     Vision loss Brother         macular degeneration    Diabetes Brother     Macular degeneration Brother     Migraines Sister     No Known Problems Father     No Known Problems Maternal Grandmother     No Known Problems Maternal Grandfather     No Known Problems Paternal Grandmother     No Known Problems Paternal Grandfather     No Known Problems Sister     No Known Problems Maternal Aunt     No Known Problems Maternal Uncle     No Known Problems Paternal Aunt     No Known Problems Paternal Uncle     Melanoma Neg Hx     Amblyopia Neg Hx     Blindness Neg Hx     Cataracts Neg Hx     Glaucoma Neg Hx     Hypertension Neg Hx     Retinal detachment Neg Hx     Strabismus Neg Hx     Thyroid disease Neg Hx     Allergic rhinitis Neg Hx     Allergies Neg Hx     Angioedema Neg Hx     Asthma  Neg Hx     Eczema Neg Hx     Urticaria Neg Hx     Rhinitis Neg Hx     Immunodeficiency Neg Hx     Atopy Neg Hx        CURRENTS MEDICATIONS:  No current facility-administered medications on file prior to encounter.      Current Outpatient Medications on File Prior to Encounter   Medication Sig Dispense Refill    butalbital-acetaminophen-caffeine -40 mg (FIORICET, ESGIC) -40 mg per tablet Take 1 tablet by mouth as needed for Pain.      celecoxib (CELEBREX) 200 MG capsule Take 1 capsule (200 mg total) by mouth 2 (two) times daily. 60 capsule 0    clonazePAM (KLONOPIN) 0.5 MG tablet Take up to 2 tablets by mouth every night at bedtime 60 tablet 5    diclofenac sodium (VOLTAREN) 1 % Gel Apply 2 g topically once daily. 100 g 0    HYDROcodone-acetaminophen (NORCO) 5-325 mg per tablet Take 1 tablet by mouth every 8 (eight) hours as needed for Pain (moderate to severe). 21 tablet 0    lamoTRIgine (LAMICTAL) 200 MG tablet take 1 tablet by mouth every day 90 tablet 3    pravastatin (PRAVACHOL) 40 MG tablet Take 1 tablet (40 mg total) by mouth once daily. 90 tablet 3    RABEprazole (ACIPHEX) 20 mg tablet Take 1 tablet (20 mg total) by mouth 2 (two) times daily. 180 tablet 3    selegiline (EMSAM) 12 mg/24 hr Place 1 patch onto the skin once daily. 90 patch 3    selegiline (EMSAM) 12 mg/24 hr Place onto the skin once daily 90 patch 3    senna-docusate 8.6-50 mg (PERICOLACE) 8.6-50 mg per tablet Take 2 tablets by mouth nightly as needed for Constipation.      sucralfate (CARAFATE) 1 gram tablet TAKE 1 TABLET BY MOUTH 4 TIMES A  tablet 4    traZODone (DESYREL) 100 MG tablet take 1 tablet by mouth every night at bedtime 90 tablet 3    [START ON 11/21/2019] galcanezumab-gnlm (EMGALITY PEN) 120 mg/mL PnIj Inject 120 mg into the skin every 28 days. 2 mL 2    HYDROcodone-acetaminophen (NORCO) 7.5-325 mg per tablet Take 1 tablet by mouth every 12 (twelve) hours as needed for Pain. 60 tablet 0     hydrocortisone 2.5 % cream Apply topically 2 (two) times daily. for 10 days 20 g 0    indomethacin (INDOCIN) 25 MG capsule Take 1 capsule (25 mg total) by mouth 3 (three) times daily as needed. 30 capsule 0    selegiline (EMSAM) 12 mg/24 hr Place onto the skin once daily. 90 patch 3    VOLTAREN 1 % Gel       [DISCONTINUED] methylPREDNISolone (MEDROL DOSEPACK) 4 mg tablet use as directed 1 Package 0       ALLERGIES:  Review of patient's allergies indicates:   Allergen Reactions    Alphagan [brimonidine]      Patient taking MASO-B Selective Inhibitor Selegiline (Emsam)    Coumadin [warfarin]      itch    Oxycodone      hiccups    Pennsaid [diclofenac sodium] Rash       REVIEW OF SYSTEMS:  Review of Systems   Constitutional: Negative for diaphoresis, fever and weight loss.   Respiratory: Negative for shortness of breath.    Cardiovascular: Negative for chest pain.   Gastrointestinal: Negative for blood in stool.   Genitourinary: Negative for hematuria.   Endo/Heme/Allergies: Does not bruise/bleed easily.   All other systems reviewed and are negative.        OBJECTIVE:    PHYSICAL EXAMINATION:   Vitals:    10/25/19 1429   BP: 121/68   Pulse: (!) 57   Resp: 16   Temp: 97.8 °F (36.6 °C)       Neurosurgery Physical Exam    Ortho Exam        Neurologic Exam  Physical Exam:   Vitals reviewed.   Constitutional: He appears well-developed and well-nourished.   Eyes: Pupils are equal, round, and reactive to light. Conjunctivae and EOM are normal.   Cardiovascular: Normal distal pulses and no edema.   Abdominal: Soft.   Skin: Skin displays no rash on trunk and no rash on extremities. Skin displays no lesions on trunk and no lesions on extremities.     Psych/Behavior: He is alert. He is oriented to person, place, and time. He has a normal mood and affect.     Musculoskeletal:   Neck: Range of motion is full.     Neurological:   DTRs: Tricep reflexes are 2+ on the right side and 2+ on the left side. Bicep reflexes are 2+ on  the right side and 2+ on the left side. Brachioradialis reflexes are 2+ on the right side and 2+ on the left side. Patellar reflexes are 2+ on the right side and 2+ on the left side. Achilles reflexes are 0 on the right side and 0 on the left side.      Back Exam   Tenderness   The patient is experiencing tenderness in the sacroiliac (Right-sided).   Range of Motion   Extension: abnormal   Flexion: abnormal   Lateral bend right: normal   Lateral bend left: normal   Rotation right: normal   Rotation left: normal   Muscle Strength   Right Quadriceps: 5/5   Left Quadriceps: 5/5   Right Hamstrings: 5/5   Left Hamstrings: 5/5   Tests   Straight leg raise right: negative   Straight leg raise left: negative   Other   Toe walk: normal   Heel walk: normal      SI joint:   Palpation at the right is painful   ABDIEL test is positive on the right side   Gaenslen test is positive on the right side   Thigh thrust test is positive on the right side   Neurologic Exam   Mental Status   Oriented to person, place, and time.   Speech: speech is normal   Level of consciousness: alert   Cranial Nerves   Cranial nerves II through XII intact.   CN III, IV, VI   Pupils are equal, round, and reactive to light.  Extraocular motions are normal.   Motor Exam   Muscle bulk: normal  Overall muscle tone: normal   Strength   Right deltoid: 5/5   Left deltoid: 5/5   Right biceps: 5/5   Left biceps: 5/5   Right triceps: 5/5   Left triceps: 5/5   Right wrist flexion: 5/5   Left wrist flexion: 5/5   Right wrist extension: 5/5   Left wrist extension: 5/5   Right interossei: 5/5   Left interossei: 5/5   Right iliopsoas: 5/5   Left iliopsoas: 5/5   Right quadriceps: 5/5   Left quadriceps: 5/5   Right hamstrin/5   Left hamstrin/5   Right anterior tibial: 4/5   Left anterior tibial: 4/5   Right posterior tibial: 4/5   Left posterior tibial: 4/5   Right peroneal: 4/5   Left peroneal: 4/5   Right gastroc: 4/5   Left gastroc: 4/5   Sensory Exam   Light  touch normal.   Pinprick normal.   Gait, Coordination, and Reflexes   Reflexes   Right brachioradialis: 2+  Left brachioradialis: 2+  Right biceps: 2+  Left biceps: 2+  Right triceps: 2+  Left triceps: 2+  Right patellar: 2+  Left patellar: 2+  Right achilles: 0  Left achilles: 0  Right plantar: normal  Left plantar: normal  Right De Leon: absent  Left De Leon: absent  Right ankle clonus: absent  Left ankle clonus: absent     DIAGNOSTIC DATA:  I personally interpreted the following imaging:   Right MRI SI joint 10/17/2019 shows acute right L5-S1 disc herniation with significant compression of the right S1 nerve root.     ASSESMENT:  This is a 69 y.o. male with     Problem List Items Addressed This Visit        Neuro    Lumbar disc herniation with radiculopathy            PLAN:  I explained the natural history of the disease and all treatment options. I recommended a right L5-S1 microdiscectomy since his pain has been debilitating.     We have discussed the risks of surgery including bleeding, infection, failure of surgery, CSF leak, nerve root injury, spinal cord injury, ureter injury, weakness, paralysis, peripheral neuropathy, need for reoperation. Patient understands the risks and would like to proceed with surgery.          Jason Caldwell MD  Cell:[phone]

## 2019-10-25 NOTE — ASSESSMENT & PLAN NOTE
Migrainous component of presentation / frequency < 15 per month   Failed preventive: background of AEDs / Antidepressants / tramadol     Plan:  Anti-CGRP / Emgality - Reviewed SE  Continue Neurontin / Lamictal  Abortive regimen: indomethacin trial / at the background of excedrin

## 2019-10-26 VITALS
RESPIRATION RATE: 18 BRPM | OXYGEN SATURATION: 96 % | DIASTOLIC BLOOD PRESSURE: 73 MMHG | SYSTOLIC BLOOD PRESSURE: 131 MMHG | BODY MASS INDEX: 28.07 KG/M2 | TEMPERATURE: 99 F | HEART RATE: 76 BPM | HEIGHT: 68 IN | WEIGHT: 185.19 LBS

## 2019-10-26 PROCEDURE — 63600175 PHARM REV CODE 636 W HCPCS: Performed by: STUDENT IN AN ORGANIZED HEALTH CARE EDUCATION/TRAINING PROGRAM

## 2019-10-26 PROCEDURE — 25000003 PHARM REV CODE 250: Performed by: STUDENT IN AN ORGANIZED HEALTH CARE EDUCATION/TRAINING PROGRAM

## 2019-10-26 RX ORDER — HYDROMORPHONE HYDROCHLORIDE 2 MG/1
2 TABLET ORAL EVERY 4 HOURS PRN
Qty: 10 TABLET | Refills: 0 | Status: SHIPPED | OUTPATIENT
Start: 2019-10-26 | End: 2019-10-31

## 2019-10-26 RX ORDER — HYDROCODONE BITARTRATE AND ACETAMINOPHEN 7.5; 325 MG/1; MG/1
1 TABLET ORAL EVERY 4 HOURS PRN
Qty: 60 TABLET | Refills: 0 | Status: SHIPPED | OUTPATIENT
Start: 2019-10-26 | End: 2020-05-01

## 2019-10-26 RX ORDER — HYDROMORPHONE HYDROCHLORIDE 2 MG/1
2 TABLET ORAL EVERY 4 HOURS PRN
Qty: 10 TABLET | Refills: 0 | Status: SHIPPED | OUTPATIENT
Start: 2019-10-26 | End: 2019-10-26 | Stop reason: SDUPTHER

## 2019-10-26 RX ORDER — ACETAMINOPHEN 325 MG/1
650 TABLET ORAL EVERY 6 HOURS PRN
Status: DISCONTINUED | OUTPATIENT
Start: 2019-10-26 | End: 2019-10-26 | Stop reason: HOSPADM

## 2019-10-26 RX ADMIN — CEFAZOLIN 2 G: 1 INJECTION, POWDER, FOR SOLUTION INTRAMUSCULAR; INTRAVENOUS at 08:10

## 2019-10-26 RX ADMIN — ACETAMINOPHEN 650 MG: 325 TABLET ORAL at 06:10

## 2019-10-26 RX ADMIN — BISACODYL 10 MG: 10 SUPPOSITORY RECTAL at 08:10

## 2019-10-26 RX ADMIN — LAMOTRIGINE 200 MG: 100 TABLET ORAL at 08:10

## 2019-10-26 RX ADMIN — METHOCARBAMOL TABLETS 500 MG: 500 TABLET, COATED ORAL at 01:10

## 2019-10-26 RX ADMIN — METHOCARBAMOL TABLETS 500 MG: 500 TABLET, COATED ORAL at 08:10

## 2019-10-26 RX ADMIN — HEPARIN SODIUM 5000 UNITS: 5000 INJECTION, SOLUTION INTRAVENOUS; SUBCUTANEOUS at 06:10

## 2019-10-26 RX ADMIN — CELECOXIB 200 MG: 200 CAPSULE ORAL at 08:10

## 2019-10-26 RX ADMIN — PRAVASTATIN SODIUM 40 MG: 40 TABLET ORAL at 08:10

## 2019-10-26 RX ADMIN — HEPARIN SODIUM 5000 UNITS: 5000 INJECTION, SOLUTION INTRAVENOUS; SUBCUTANEOUS at 01:10

## 2019-10-26 RX ADMIN — HYDROCODONE BITARTRATE AND ACETAMINOPHEN 1 TABLET: 5; 325 TABLET ORAL at 12:10

## 2019-10-26 RX ADMIN — PANTOPRAZOLE SODIUM 40 MG: 40 TABLET, DELAYED RELEASE ORAL at 08:10

## 2019-10-26 NOTE — DISCHARGE SUMMARY
[facility]  Neurosurgery  Discharge Summary      Patient Name: Raffy Rutherford Jr.  MRN: [id number]  Admission Date: 10/25/2019  Hospital Length of Stay: 1 days  Discharge Date and Time:  10/26/2019 1:16 PM  Attending Physician: Jason Caldwell MD   Discharging Provider: Sixto Fontana MD  Primary Care Provider: Nini Rendon MD     HPI: This is a very pleasant 69 y.o. male, who is status post L3-5 fusion  And instrumentation in 2015. He has been treated for right SI joint dysfunction since 12/2018 by me. He had a recent right SI joint injection on 09/19/2019 and got significant relief but a few days later his pain went very severe.      An MRI was ordered showing an acute right L5-S1 larege disc herniation compression the right S1 nerve root.      His pain has been severe and constant. He is unable to walk more than 5 minutes because of the pain in his right leg.     Procedure(s) (LRB):  LAMINECTOMY, SPINE, LUMBAR, FOR DECOMPRESSION (Right)     Hospital Course: Patient was admitted for spinal stenosis on 10/25/19 and underwent L5-S1 lumbar decompression on 10/25/19 without perioperative complications. The patient remained on abx until all drains were discontinued and was kept on appropriate DVT prophylaxis during the course of admission. At the time of discharge, the patient was tolerating PO intake without N/V, dysphagia, denied bowel or bladder dysfunction, denied new neurological symptoms, and reported pain controlled with current regimen. The surgical site was without evidence of drainage, breakdown or infection and all staples/sutures were intact. The patient will follow up in clinic as indicated in discharge instructions. All questions were answered and continued treatment/woundcare instructions were discussed in detail prior to discharge.      Pending Diagnostic Studies:     None        Final Active Diagnoses:    Diagnosis Date Noted POA    Lumbar disc herniation with radiculopathy  [M51.16] 10/25/2019 Yes      Problems Resolved During this Admission:      Discharged Condition: good    Disposition:     Follow Up:    Patient Instructions:   No discharge procedures on file.  Medications:  Reconciled Home Medications:      Medication List      START taking these medications    HYDROmorphone 2 MG tablet  Commonly known as:  DILAUDID  Take 1 tablet (2 mg total) by mouth every 4 (four) hours as needed for Pain.        CHANGE how you take these medications    * HYDROcodone-acetaminophen 5-325 mg per tablet  Commonly known as:  NORCO  Take 1 tablet by mouth every 8 (eight) hours as needed for Pain (moderate to severe).  What changed:  Another medication with the same name was changed. Make sure you understand how and when to take each.     * HYDROcodone-acetaminophen 7.5-325 mg per tablet  Commonly known as:  NORCO  Take 1 tablet by mouth every 4 (four) hours as needed for Pain.  What changed:  when to take this         * This list has 2 medication(s) that are the same as other medications prescribed for you. Read the directions carefully, and ask your doctor or other care provider to review them with you.            CONTINUE taking these medications    butalbital-acetaminophen-caffeine -40 mg -40 mg per tablet  Commonly known as:  FIORICET, ESGIC  Take 1 tablet by mouth as needed for Pain.     celecoxib 200 MG capsule  Commonly known as:  CeleBREX  Take 1 capsule (200 mg total) by mouth 2 (two) times daily.     clonazePAM 0.5 MG tablet  Commonly known as:  KLONOPIN  Take up to 2 tablets by mouth every night at bedtime     galcanezumab-gnlm 120 mg/mL Pnij  Commonly known as:  EMGALITY PEN  Inject 120 mg into the skin every 28 days.  Start taking on:  November 21, 2019     hydrocortisone 2.5 % cream  Apply topically 2 (two) times daily. for 10 days     indomethacin 25 MG capsule  Commonly known as:  INDOCIN  Take 1 capsule (25 mg total) by mouth 3 (three) times daily as needed.      lamoTRIgine 200 MG tablet  Commonly known as:  LAMICTAL  take 1 tablet by mouth every day     pravastatin 40 MG tablet  Commonly known as:  PRAVACHOL  Take 1 tablet (40 mg total) by mouth once daily.     RABEprazole 20 mg tablet  Commonly known as:  ACIPHEX  Take 1 tablet (20 mg total) by mouth 2 (two) times daily.     * selegiline 12 mg/24 hr  Commonly known as:  EMSAM  Place 1 patch onto the skin once daily.     * EMSAM 12 mg/24 hr  Generic drug:  selegiline  Place onto the skin once daily.     * selegiline 12 mg/24 hr  Commonly known as:  EMSAM  Place onto the skin once daily     senna-docusate 8.6-50 mg 8.6-50 mg per tablet  Commonly known as:  PERICOLACE  Take 2 tablets by mouth nightly as needed for Constipation.     sucralfate 1 gram tablet  Commonly known as:  CARAFATE  TAKE 1 TABLET BY MOUTH 4 TIMES A DAY     traZODone 100 MG tablet  Commonly known as:  DESYREL  take 1 tablet by mouth every night at bedtime     * VOLTAREN 1 % Gel  Generic drug:  diclofenac sodium     * diclofenac sodium 1 % Gel  Commonly known as:  VOLTAREN  Apply 2 g topically once daily.         * This list has 5 medication(s) that are the same as other medications prescribed for you. Read the directions carefully, and ask your doctor or other care provider to review them with you.                Sixto Fontana MD  Neurosurgery  [facility]

## 2019-10-26 NOTE — OP NOTE
DATE OF PROCEDURE: 10/25/2019     PREOPERATIVE DIAGNOSES:  1.   S/P L3-5 fusion  2.  Right L5-S1 sequestered disc herniation with radiculopathy     POSTOPERATIVE DIAGNOSES:  1.   S/P L3-5 fusion  2.  Right L5-S1 sequestered disc herniation with radiculopathy     PROCEDURES:     1.  Right MIS approach to the L5-S1  level  2.  L5-S1 laminotomy and microdiscectomy  3. Microscope assistance  4. Fluoroscopy     PRIMARY SURGEON: Jason Caldwell M.D.     ASSISTANT SURGEON: DEANN     INDICATIONS: This is a 69-year-old male with severe  right  leg pain caused by a large sequestered disc herniation compressing the exiting L5 and traversing S1 nerve root.The risks of the procedure include hemorrhage, infection, paralysis, loss of sensation, loss of sphincter functions, death and postoperative medical complication.     DESCRIPTION OF THE PROCEDURE: The patient was intubated under general   anesthesia. He was placed prone on the Favian table frame. All pressure points   were carefully padded.      Using fluoroscopy, 1 x 18 mm incision was planned between L5 and S1 15 mm right to the midline. Incision was made with # 15 blade. Subcutaneous tissue hemostasis was completed and the thoracolumbar fascia was opened with a k-wire. We then inserted the serial dilators and a final 18 mm x 5 cm tubular retractor was docked at the junction of the inferior lamina and the right inferior facet of L5-S1 and the retractor was fixed on the table and then using a microscope, the multifidus muscle was subperiosteally dissected using the monopolar. We then exposed the base of the spinous process, the lamina, and the medial facets. Using the high speed thalia, we drilled the base of the spinous process, the ipsilateral lamina, as well as the ipsilateral one-half of the medial facet to decompress the  traversing S1 nerve roots. The yellow ligament insertion was exposed and  the ligament was resected using Kerrisons ipsilaterally and  contralaterally.      We the retracted the dural sac and found a large extruded disc herniation rostrally. The extruded disc was resected piece meal. The annulus was then divided and the disc content was scraped off the endplate and removed in a piece meal fashion.     Abundant irrigation and vacomycin application.      We placed a TLS drain in the epidural space and the distal extremity of the drain was tunneled through the skin.  The drain was fixated with 3 0 nylon.     The thoracolumbar fascia was closed with 0 Vicryl. The wound's subcutaneous layer was closed with inverted 2-0 Vicryl and the skin was closed with a running subcuticular 4-0 Monocryl. The wound was dressed with Steri-Strips and the patient was transferred to the Recovery Room under the care of the anesthesiologist. Blood loss was 30 mL. There was no complication.

## 2019-10-26 NOTE — NURSING TRANSFER
Nursing Transfer Note      10/25/2019     Transfer To: [id number] from PACU    Transfer via stretcher    Transfer with     Transported by Transport    Medicines sent: NS, mupirocin ointment    Chart send with patient: Yes    Notified: spouse    Patient reassessed at: 23:33 10/25/19

## 2019-10-26 NOTE — PROGRESS NOTES
Patient AAOx3, VSS on room air, tolerating sips of water/juice/coke, pain now tolerable after PACU medication and prn medications per mar. Patient's wife and daughter visited at bedside, reviewed plan of care with Patient and Family answered all questions. Labs drawn and sent, report called to Neuro floor nurse. Pending transfer. Elmer

## 2019-10-26 NOTE — DISCHARGE INSTRUCTIONS
Neurosurgery Patient Information. Please follow ONLY the instructions that are checked below.    Activity Restrictions:  [x]  Return to work will be determined on an individual basis.  [x]  No lifting greater than 5-10 pounds.  [x]  Avoid bending and twisting the area of your surgery more than 45 degrees from neutral position in any direction.  [x]  No driving or operating machinery:  [x]  until cleared by your surgeon.  [x]  while taking narcotic pain medications or muscle relaxants.  [x]  No brace required  [x]  Walk on paved surfaces only. It is okay to walk up and down stairs while holding onto a side rail.  [x]  No sexual activity for 6 weeks.    Discharge Medication/Follow-up:  [x]  Please refer to discharge medication reconciliation form.  [x]  Do not take ANY Aspirin or Aspirin containing products for 2 weeks after surgery.   [x]  Do not take ANY herbal supplements for 2 weeks after surgery.    [x]  Prescriptions for appropriate medication will be given upon discharge.  [x]  Take docusate (Colace 100 mg): take one capsule a day as needed for constipation. You can get this over the counter.  [x]  Follow-up appointment:  [x]  10-14 days post-op for wound check by physician assistant/nurse  [x]  4-6 weeks with MD:  [x]  with x-rays  [x]  An appointment will be mailed to you.    Wound Care:  [x]  No bandage required. Keep your incision open to the air.  [x]  You may shower on the 2nd day after your surgery. Keep the incision clean and dry at all times. Please cover the incision while showering and REMOVE once you have completed taking your shower. Do not allow the force of water to hit the incision. If the incision gets damp, pat it dry. Do not rub or scrub the incision.  [x]  You cannot take a bath until 8 weeks after surgery.  [x]  Apply bacitracin to incision twice a day for 2 weeks.    Call your doctor or go to the Emergency Room for any signs of infection, including: increased redness, drainage, pain, or  fever (temperature ?101.5 for 24 hours). Call your doctor or go to the Emergency Room if there are any localized neurological changes; problems with speech, vision, numbness, tingling, weakness, or severe headache; or for other concerns.    Special Instructions:  [x]  No use of tobacco products.  [x]  Diet: Please eat a regular diet as tolerated.      Physicians need 3 days' notice for pain medicine to be refilled. Pain medicine will only be refilled between 8 AM and 5 PM, Monday through Friday, due to Food and Drug Administration regulation of documentation.        [facility] Neurosurgery Office: [phone]              [facility] [place] Neurosurgery Office: [phone]   [place] Neurosurgery Office: [phone]

## 2019-10-26 NOTE — TRANSFER OF CARE
"Anesthesia Transfer of Care Note    Patient: Raffy Rutherford Jr.    Procedure(s) Performed: Procedure(s) (LRB):  LAMINECTOMY, SPINE, LUMBAR, FOR DECOMPRESSION (Right)    Patient location: PACU    Anesthesia Type: general    Transport from OR: Transported from OR on 6-10 L/min O2 by face mask with adequate spontaneous ventilation    Post pain: adequate analgesia    Post assessment: no apparent anesthetic complications    Post vital signs: stable    Level of consciousness: awake    Nausea/Vomiting: no nausea/vomiting    Complications: none    Transfer of care protocol was followed      Last vitals:   Visit Vitals  /68 (BP Location: Left arm, Patient Position: Lying)   Pulse (!) 57   Temp 36.6 °C (97.8 °F) (Oral)   Resp 16   Ht 5' 8" (1.727 m)   Wt 78.9 kg (174 lb)   SpO2 98%   BMI 26.46 kg/m²     "

## 2019-10-26 NOTE — ANESTHESIA POSTPROCEDURE EVALUATION
Anesthesia Post Evaluation    Patient: Raffy Rutherford Jr.    Procedure(s) Performed: Procedure(s) (LRB):  LAMINECTOMY, SPINE, LUMBAR, FOR DECOMPRESSION (Right)    [facility] Anesthesia Post Op Evaluation      Vitals Value Taken Time   /74 10/25/2019 11:01 PM   Temp 36.5 °C (97.7 °F) 10/25/2019 11:00 PM   Pulse 69 10/25/2019 11:30 PM   Resp 20 10/25/2019 11:30 PM   SpO2 91 % 10/25/2019 11:30 PM   Vitals shown include unvalidated device data.      Event Time     Out of Recovery 21:45:00          Pain/Tequila Score: Pain Rating Prior to Med Admin: 4 (10/25/2019 10:36 PM)  Pain Rating Post Med Admin: 6 (10/25/2019  9:45 PM)  Tequila Score: 10 (10/25/2019 10:33 PM)

## 2019-10-28 NOTE — TELEPHONE ENCOUNTER
DOCUMENTATION ONLY:   Prior authorization for Emgality approved from 10/22/2019 to 04/13/2020.      Case ID# [id number]    Co-pay: $25    Patient Assistance IS required and is being researched.      Forward to patient assistance for review. FLC

## 2019-10-29 ENCOUNTER — PATIENT MESSAGE (OUTPATIENT)
Dept: PRIMARY CARE CLINIC | Facility: CLINIC | Age: 69
End: 2019-10-29

## 2019-10-29 ENCOUNTER — DOCUMENTATION ONLY (OUTPATIENT)
Dept: REHABILITATION | Facility: HOSPITAL | Age: 69
End: 2019-10-29

## 2019-10-29 NOTE — PROGRESS NOTES
Outpatient Therapy Discharge Summary     Name: Raffy Rutherford Jr.  Clinic Number: [id number]    Therapy Diagnosis: chronic pain  Physician: Jason Caldwell    Physician Orders: Eval & treat   Medical Diagnosis: R SI joint dysfunction  Evaluation Date: 9/20/2019      Date of Last visit: 10/22/2019  Total Visits Received: 3  Cancelled Visits: none  No Show Visits: none    Assessment    Goals: not met 2/2 surgical intervention on 10/25/2019    1.  Right MIS approach to the L5-S1  level  2.  L5-S1 laminotomy and microdiscectomy    Discharge reason: Patient has been hospitalized for surgical intervention    Plan   This patient is discharged from Physical Therapy

## 2019-10-30 ENCOUNTER — TELEPHONE (OUTPATIENT)
Dept: PRIMARY CARE CLINIC | Facility: CLINIC | Age: 69
End: 2019-10-30

## 2019-10-31 ENCOUNTER — OFFICE VISIT (OUTPATIENT)
Dept: INTERNAL MEDICINE | Facility: CLINIC | Age: 69
End: 2019-10-31
Payer: COMMERCIAL

## 2019-10-31 ENCOUNTER — LAB VISIT (OUTPATIENT)
Dept: LAB | Facility: HOSPITAL | Age: 69
End: 2019-10-31
Attending: INTERNAL MEDICINE
Payer: COMMERCIAL

## 2019-10-31 ENCOUNTER — TELEPHONE (OUTPATIENT)
Dept: INTERNAL MEDICINE | Facility: CLINIC | Age: 69
End: 2019-10-31

## 2019-10-31 VITALS
WEIGHT: 178 LBS | HEART RATE: 81 BPM | OXYGEN SATURATION: 96 % | DIASTOLIC BLOOD PRESSURE: 64 MMHG | SYSTOLIC BLOOD PRESSURE: 112 MMHG | BODY MASS INDEX: 27.06 KG/M2

## 2019-10-31 DIAGNOSIS — Z00.00 WELLNESS EXAMINATION: ICD-10-CM

## 2019-10-31 DIAGNOSIS — Z12.5 PROSTATE CANCER SCREENING: ICD-10-CM

## 2019-10-31 DIAGNOSIS — E55.9 MILD VITAMIN D DEFICIENCY: ICD-10-CM

## 2019-10-31 DIAGNOSIS — Z00.00 PHYSICAL EXAM: Primary | ICD-10-CM

## 2019-10-31 DIAGNOSIS — E78.5 HYPERLIPIDEMIA, UNSPECIFIED HYPERLIPIDEMIA TYPE: ICD-10-CM

## 2019-10-31 LAB
25(OH)D3+25(OH)D2 SERPL-MCNC: 44 NG/ML (ref 30–96)
ALBUMIN SERPL BCP-MCNC: 3.9 G/DL (ref 3.5–5.2)
ALP SERPL-CCNC: 66 U/L (ref 55–135)
ALT SERPL W/O P-5'-P-CCNC: 25 U/L (ref 10–44)
ANION GAP SERPL CALC-SCNC: 10 MMOL/L (ref 8–16)
AST SERPL-CCNC: 21 U/L (ref 10–40)
BASOPHILS # BLD AUTO: 0.03 K/UL (ref 0–0.2)
BASOPHILS NFR BLD: 0.4 % (ref 0–1.9)
BILIRUB SERPL-MCNC: 0.2 MG/DL (ref 0.1–1)
BUN SERPL-MCNC: 18 MG/DL (ref 8–23)
CALCIUM SERPL-MCNC: 9.2 MG/DL (ref 8.7–10.5)
CHLORIDE SERPL-SCNC: 103 MMOL/L (ref 95–110)
CHOLEST SERPL-MCNC: 237 MG/DL (ref 120–199)
CHOLEST/HDLC SERPL: 3.3 {RATIO} (ref 2–5)
CO2 SERPL-SCNC: 27 MMOL/L (ref 23–29)
COMPLEXED PSA SERPL-MCNC: 6.3 NG/ML (ref 0–4)
CREAT SERPL-MCNC: 0.8 MG/DL (ref 0.5–1.4)
DIFFERENTIAL METHOD: ABNORMAL
EOSINOPHIL # BLD AUTO: 0.2 K/UL (ref 0–0.5)
EOSINOPHIL NFR BLD: 1.9 % (ref 0–8)
ERYTHROCYTE [DISTWIDTH] IN BLOOD BY AUTOMATED COUNT: 22.2 % (ref 11.5–14.5)
EST. GFR  (AFRICAN AMERICAN): >60 ML/MIN/1.73 M^2
EST. GFR  (NON AFRICAN AMERICAN): >60 ML/MIN/1.73 M^2
GLUCOSE SERPL-MCNC: 103 MG/DL (ref 70–110)
HCT VFR BLD AUTO: 31.5 % (ref 40–54)
HDLC SERPL-MCNC: 71 MG/DL (ref 40–75)
HDLC SERPL: 30 % (ref 20–50)
HGB BLD-MCNC: 9.6 G/DL (ref 14–18)
LDLC SERPL CALC-MCNC: 135.8 MG/DL (ref 63–159)
LYMPHOCYTES # BLD AUTO: 2.3 K/UL (ref 1–4.8)
LYMPHOCYTES NFR BLD: 29.8 % (ref 18–48)
MCH RBC QN AUTO: 22.6 PG (ref 27–31)
MCHC RBC AUTO-ENTMCNC: 30.5 G/DL (ref 32–36)
MCV RBC AUTO: 74 FL (ref 82–98)
MONOCYTES # BLD AUTO: 0.6 K/UL (ref 0.3–1)
MONOCYTES NFR BLD: 7.9 % (ref 4–15)
NEUTROPHILS # BLD AUTO: 4.7 K/UL (ref 1.8–7.7)
NEUTROPHILS NFR BLD: 60 % (ref 38–73)
NONHDLC SERPL-MCNC: 166 MG/DL
PLATELET # BLD AUTO: 478 K/UL (ref 150–350)
PMV BLD AUTO: 8.5 FL (ref 9.2–12.9)
POTASSIUM SERPL-SCNC: 4.2 MMOL/L (ref 3.5–5.1)
PROT SERPL-MCNC: 7.5 G/DL (ref 6–8.4)
RBC # BLD AUTO: 4.24 M/UL (ref 4.6–6.2)
SODIUM SERPL-SCNC: 140 MMOL/L (ref 136–145)
TRIGL SERPL-MCNC: 151 MG/DL (ref 30–150)
WBC # BLD AUTO: 7.86 K/UL (ref 3.9–12.7)

## 2019-10-31 PROCEDURE — 99397 PER PM REEVAL EST PAT 65+ YR: CPT | Mod: S$GLB,,, | Performed by: INTERNAL MEDICINE

## 2019-10-31 PROCEDURE — 80061 LIPID PANEL: CPT

## 2019-10-31 PROCEDURE — 85025 COMPLETE CBC W/AUTO DIFF WBC: CPT

## 2019-10-31 PROCEDURE — 80053 COMPREHEN METABOLIC PANEL: CPT

## 2019-10-31 PROCEDURE — 36415 COLL VENOUS BLD VENIPUNCTURE: CPT

## 2019-10-31 PROCEDURE — 82306 VITAMIN D 25 HYDROXY: CPT

## 2019-10-31 PROCEDURE — 84153 ASSAY OF PSA TOTAL: CPT

## 2019-10-31 PROCEDURE — 99397 PR PREVENTIVE VISIT,EST,65 & OVER: ICD-10-PCS | Mod: S$GLB,,, | Performed by: INTERNAL MEDICINE

## 2019-10-31 PROCEDURE — 99999 PR PBB SHADOW E&M-EST. PATIENT-LVL III: ICD-10-PCS | Mod: PBBFAC,,, | Performed by: INTERNAL MEDICINE

## 2019-10-31 PROCEDURE — 99999 PR PBB SHADOW E&M-EST. PATIENT-LVL III: CPT | Mod: PBBFAC,,, | Performed by: INTERNAL MEDICINE

## 2019-10-31 RX ORDER — CLONAZEPAM 0.5 MG/1
TABLET ORAL
Qty: 60 TABLET | Refills: 5 | Status: SHIPPED | OUTPATIENT
Start: 2019-10-31 | End: 2020-05-07 | Stop reason: SDUPTHER

## 2019-10-31 RX ORDER — BUTALBITAL, ACETAMINOPHEN AND CAFFEINE 50; 325; 40 MG/1; MG/1; MG/1
1 TABLET ORAL
Qty: 30 TABLET | Refills: 0 | Status: SHIPPED | OUTPATIENT
Start: 2019-10-31 | End: 2020-05-03 | Stop reason: SDUPTHER

## 2019-10-31 RX ORDER — PRAVASTATIN SODIUM 40 MG/1
40 TABLET ORAL DAILY
Qty: 90 TABLET | Refills: 3 | Status: SHIPPED | OUTPATIENT
Start: 2019-10-31 | End: 2020-11-05 | Stop reason: SDUPTHER

## 2019-10-31 NOTE — PROGRESS NOTES
"MERCEDES Samaniego contacted Mr. Rutherford regarding the I program and to complete his assessments.  Mr. Rutherford scored "13" on the PHQ 9 and "7" on the DARLINE 7.  Mr. Rutherford case is being reviewed.  CHW will follow-up with Mr. Rutherford next week.   "

## 2019-10-31 NOTE — LETTER
October 31, 2019    Raffy Rutherford Jr.  [address] [facility] [place] [address]             [facility] - Internal Medicine  [address]  [facility] [address]  Phone: [phone]  Fax: [phone] To whom it may concern:    Mr. 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 In his recovery he is not able to sit for a prolonged period of time.    If you have any questions or concerns, please don't hesitate to call.    Sincerely,          Nini Rendon MD

## 2019-10-31 NOTE — PROGRESS NOTES
Subjective:      Patient ID: Raffy Rutherford Jr. is a 69 y.o. male.    Chief Complaint: Annual Exam    HPI:  HPI   Patient is here for annual exam:    Patient had recent back surgery last week with a pain level of 7/10 on hydrocodone 7.5-32 mg. He is followed by Dr. Caldwell.    Annual exam: 10/31/2019  Colonoscopy 5/5/2014 : 3 polyps 5-10 years : colonoscopy scheduled  Endoscopy 4/2015  EGD scheduled  Optho: Cataract surgery with continued visual changes: Dr. Ocampo  Tinnitus:  chronic  Flu: done  Tetanus: done  Shingles: done, discussed Shingrix  Pneumovax : due  Prevnar: Prevnar 23 done    Patient Active Problem List   Diagnosis    Depression    Hyperlipidemia    Chronic pain    Colon polyp    Adenomatous polyp    History of prostate biopsy    GERD (gastroesophageal reflux disease)    Back pain    Sleep apnea    Visual disturbances    Spondylosis without myelopathy    Degeneration of lumbar or lumbosacral intervertebral disc    Spinal stenosis, lumbar region, without neurogenic claudication    Thoracic or lumbosacral neuritis or radiculitis, unspecified    Displacement of lumbar intervertebral disc without myelopathy    Acquired spondylolisthesis    Lumbago    Status post cataract extraction and insertion of intraocular lens - Both Eyes    Prostatitis, acute    Fibromyalgia    OP (osteoporosis)    Compression fracture of T12 vertebra    Diverticulitis large intestine    Osteoarthritis    Lower back pain    Lower extremity pain    Muscle weakness    Range of motion deficit    Special screening for malignant neoplasms, colon    Cervicalgia    Facet joint disease of cervical region    Occipital neuralgia    Chronic migraine without aura, with intractable migraine, so stated, with status migrainosus    Cervical radiculopathy    Paroxysmal hemicrania    Sciatic nerve pain    Chronic LBP    DJD (degenerative joint disease) of lumbar spine    Chronic neck pain    Right lumbar  radiculopathy    Thyroid nodule    Carpal tunnel syndrome of right wrist    Paresthesia    Brow ptosis    Degenerative disc disease    Encounter for postoperative wound check    Lumbar radiculopathy    Cervical spondylosis    Low back pain without sciatica    Lumbar radicular pain    S/P lumbar spinal fusion    Gait abnormality    Impaired functional mobility, balance, gait, and endurance    Left hip pain    Right wrist tendinitis    Pain in right wrist    Difficulty walking    Weakness    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    Headache around the eyes    Lumbar back pain with radiculopathy affecting lower extremity    Closed fracture of left distal radius    Pain in left wrist    Bilateral foot-drop    Idiopathic peripheral neuropathy    Sacroiliac joint dysfunction of right side    SI (sacroiliac) joint dysfunction    Episodic migraine without status migrainosus, not intractable    Lumbar disc herniation with radiculopathy     Past Medical History:   Diagnosis Date    Adenomatous polyp 2003    Adenomatous polyp     Allergy     Arthritis     Back pain     after trauma beginning in 195    Cataract     Chronic pain     neck and left shoulder    Cluster headache 2013    Colon polyp     Degenerative disc disease     Depression     Diverticulitis 12/2013    Elevated PSA     Fibromyalgia 2013    GERD (gastroesophageal reflux disease)     Hepatitis 1970's    A    History of prostate biopsy 2002    Hyperlipidemia     Joint pain     Sleep apnea     Special screening for malignant neoplasms, colon 5/5/2014    Thyroid nodule 7/16/2014    Visual disturbance 2012    problems after cataract surgery     Past Surgical History:   Procedure Laterality Date    BACK SURGERY      CATARACT EXTRACTION W/  INTRAOCULAR LENS IMPLANT Bilateral     CHOLECYSTECTOMY      COSMETIC SURGERY  2/10/2015    Direct mid-forehead brow lift    COSMETIC SURGERY  2/10/2015     Bilateral upper lid blepahroplasty    EYE SURGERY      HEMORRHOID SURGERY      with complication of chronic bleeding for 6 weeks     INJECTION OF STEROID Right 12/6/2018    Procedure: INJECTION, STEROID Right SI Joint Block and Steroid Injection;  Surgeon: Jason Caldwell MD;  Location: [facility] OR;  Service: Neurosurgery;  Laterality: Right;  Procedure: Right SI Joint Block and Steroid Injection  Surgery Time: 30 MIN  LOS: 0  Anesthesia: MAC  Radiology: C-arm  Bed: Favian 4 Poster  Position: Prone    INJECTION OF STEROID Right 9/19/2019    Procedure: INJECTION, STEROID Procedure: Right SI joint block nd steroid injection;  Surgeon: Jason Caldwell MD;  Location: [facility] OR;  Service: Neurosurgery;  Laterality: Right;  Procedure: Right SI joint block nd steroid injection  Surgery Time: 30 mins  LOS:   Anesthesia: General MAC  Radiology:C-arm  Bed: Regular Bed  Position: Prone    JOINT REPLACEMENT      KNEE SURGERY      involving arthroscopic surgery to both knees    SINUS SURGERY      SINUS SURGERY      left molar and sinus surgery for trigeminal neuralgia    SPINAL FUSION  6/22/2015    L3-L5 XLIF    SPINE SURGERY  6/22/15    XLIF/TANA    TOTAL HIP ARTHROPLASTY  4/2012    Pt states he had total hip replacement on his left hip.     Family History   Problem Relation Age of Onset    Cancer Mother         colon; uterine    Nephrolithiasis Mother     Stroke Mother 86    Pancreatitis Brother     Vision loss Brother         macular degeneration    Diabetes Brother     Macular degeneration Brother     Migraines Sister     No Known Problems Father     No Known Problems Maternal Grandmother     No Known Problems Maternal Grandfather     No Known Problems Paternal Grandmother     No Known Problems Paternal Grandfather     No Known Problems Sister     No Known Problems Maternal Aunt     No Known Problems Maternal Uncle     No Known Problems Paternal Aunt     No Known Problems Paternal Uncle      Melanoma Neg Hx     Amblyopia Neg Hx     Blindness Neg Hx     Cataracts Neg Hx     Glaucoma Neg Hx     Hypertension Neg Hx     Retinal detachment Neg Hx     Strabismus Neg Hx     Thyroid disease Neg Hx     Allergic rhinitis Neg Hx     Allergies Neg Hx     Angioedema Neg Hx     Asthma Neg Hx     Eczema Neg Hx     Urticaria Neg Hx     Rhinitis Neg Hx     Immunodeficiency Neg Hx     Atopy Neg Hx      Review of Systems   Constitutional: Positive for activity change. Negative for unexpected weight change.   HENT: Positive for hearing loss. Negative for rhinorrhea and trouble swallowing.    Eyes: Negative for discharge and visual disturbance.   Respiratory: Negative for chest tightness and wheezing.    Cardiovascular: Negative for chest pain and palpitations.   Gastrointestinal: Negative for blood in stool, constipation, diarrhea and vomiting.   Endocrine: Negative for polydipsia and polyuria.   Genitourinary: Negative for difficulty urinating, hematuria and urgency.   Musculoskeletal: Positive for arthralgias and joint swelling. Negative for neck pain.   Neurological: Positive for weakness and headaches.   Psychiatric/Behavioral: Positive for dysphoric mood. Negative for confusion.   Chronic   Objective:     Vitals:    10/31/19 1420   BP: 112/64   Pulse: 81   SpO2: 96%   Weight: 80.7 kg (178 lb)   PainSc:   7   PainLoc: Back     Body mass index is 27.06 kg/m².  Physical Exam   Constitutional: He is oriented to person, place, and time. He appears well-developed and well-nourished. No distress.   Neck: Carotid bruit is not present. No thyromegaly present.   Cardiovascular: Normal rate, regular rhythm and normal heart sounds. PMI is not displaced.   Pulmonary/Chest: Effort normal and breath sounds normal. No respiratory distress.   Abdominal: Soft. Bowel sounds are normal. He exhibits no distension. There is no tenderness.   Musculoskeletal: He exhibits no edema.   Neurological: He is alert and oriented to  person, place, and time.     Assessment:     1. Physical exam    2. Prostate cancer screening    3. Mild vitamin D deficiency    4. Hyperlipidemia, unspecified hyperlipidemia type    5. Wellness examination      Plan:   Raffy was seen today for annual exam.    Diagnoses and all orders for this visit:    Physical exam  Comments:  Recent back surgery    Prostate cancer screening  Comments:  PSA ordered  Orders:  -     PSA, Screening; Future    Mild vitamin D deficiency  Comments:  ordered  Orders:  -     CBC auto differential; Future  -     Comprehensive metabolic panel; Future  -     Lipid panel; Future  -     Vitamin D; Future    Hyperlipidemia, unspecified hyperlipidemia type  Comments:  On statin check lab    Wellness examination  Comments:  Updated    Other orders  -     pravastatin (PRAVACHOL) 40 MG tablet; Take 1 tablet (40 mg total) by mouth once daily.  -     clonazePAM (KLONOPIN) 0.5 MG tablet; Take up to 2 tablets by mouth every night at bedtime  -     butalbital-acetaminophen-caffeine -40 mg (FIORICET, ESGIC) -40 mg per tablet; Take 1 tablet by mouth 6 hours as needed for Headaches.        Problem List Items Addressed This Visit     Hyperlipidemia      Other Visit Diagnoses     Physical exam    -  Primary    Recent back surgery    Prostate cancer screening        PSA ordered    Relevant Orders    PSA, Screening    Mild vitamin D deficiency        ordered    Relevant Orders    CBC auto differential (Completed)    Comprehensive metabolic panel (Completed)    Lipid panel    Vitamin D    Wellness examination        Updated        Orders Placed This Encounter   Procedures    CBC auto differential     Standing Status:   Future     Number of Occurrences:   1     Standing Expiration Date:   12/30/2019    Comprehensive metabolic panel     Standing Status:   Future     Number of Occurrences:   1     Standing Expiration Date:   12/30/2019    Lipid panel     Standing Status:   Future     Number of  Occurrences:   1     Standing Expiration Date:   12/30/2019    PSA, Screening     Standing Status:   Future     Number of Occurrences:   1     Standing Expiration Date:   12/30/2019    Vitamin D     Standing Status:   Future     Number of Occurrences:   1     Standing Expiration Date:   12/30/2019     Follow up in about 6 months (around 4/30/2020) for Follow up.     Medication List           Accurate as of October 31, 2019  6:05 PM. If you have any questions, ask your nurse or doctor.               CHANGE how you take these medications    butalbital-acetaminophen-caffeine -40 mg -40 mg per tablet  Commonly known as:  FIORICET, ESGIC  Take 1 tablet by mouth 6 hours as needed for Headaches.  What changed:  reasons to take this  Changed by:  Nini Rendon MD        CONTINUE taking these medications    celecoxib 200 MG capsule  Commonly known as:  CeleBREX  Take 1 capsule (200 mg total) by mouth 2 (two) times daily.     clonazePAM 0.5 MG tablet  Commonly known as:  KLONOPIN  Take up to 2 tablets by mouth every night at bedtime     diclofenac sodium 1 % Gel  Commonly known as:  VOLTAREN  Apply 2 g topically once daily.     EMSAM 12 mg/24 hr  Generic drug:  selegiline  Place onto the skin once daily.     * galcanezumab-gnlm 120 mg/mL Pnij  Commonly known as:  EMGALITY PEN  Inject 240 mg into the skin once. for 1 dose     * galcanezumab-gnlm 120 mg/mL Pnij  Commonly known as:  EMGALITY PEN  Inject 120 mg into the skin every 28 days.  Start taking on:  November 21, 2019     * HYDROcodone-acetaminophen 5-325 mg per tablet  Commonly known as:  NORCO  Take 1 tablet by mouth every 8 (eight) hours as needed for Pain (moderate to severe).     * HYDROcodone-acetaminophen 7.5-325 mg per tablet  Commonly known as:  NORCO  Take 1 tablet by mouth every 4 (four) hours as needed for Pain.     indomethacin 25 MG capsule  Commonly known as:  INDOCIN  Take 1 capsule (25 mg total) by mouth 3 (three) times daily as needed.      lamoTRIgine 200 MG tablet  Commonly known as:  LAMICTAL  take 1 tablet by mouth every day     pravastatin 40 MG tablet  Commonly known as:  PRAVACHOL  Take 1 tablet (40 mg total) by mouth once daily.     RABEprazole 20 mg tablet  Commonly known as:  ACIPHEX  Take 1 tablet (20 mg total) by mouth 2 (two) times daily.     senna-docusate 8.6-50 mg 8.6-50 mg per tablet  Commonly known as:  PERICOLACE  Take 2 tablets by mouth nightly as needed for Constipation.     sucralfate 1 gram tablet  Commonly known as:  CARAFATE  TAKE 1 TABLET BY MOUTH 4 TIMES A DAY     traZODone 100 MG tablet  Commonly known as:  DESYREL  take 1 tablet by mouth every night at bedtime         * This list has 4 medication(s) that are the same as other medications prescribed for you. Read the directions carefully, and ask your doctor or other care provider to review them with you.            STOP taking these medications    hydrocortisone 2.5 % cream  Stopped by:  Nini Rendon MD           Where to Get Your Medications      These medications were sent to [facility]  [address] [place] [id number]    Hours:  Mon-Fri 7a-7p, Sat 10a-4p, Sun 10a-4p Phone:  [phone]   · butalbital-acetaminophen-caffeine -40 mg -40 mg per tablet  · clonazePAM 0.5 MG tablet  · pravastatin 40 MG tablet

## 2019-11-04 ENCOUNTER — PATIENT OUTREACH (OUTPATIENT)
Dept: ADMINISTRATIVE | Facility: OTHER | Age: 69
End: 2019-11-04

## 2019-11-04 ENCOUNTER — TELEPHONE (OUTPATIENT)
Dept: PRIMARY CARE CLINIC | Facility: CLINIC | Age: 69
End: 2019-11-04

## 2019-11-04 DIAGNOSIS — G89.29 OTHER CHRONIC PAIN: Primary | ICD-10-CM

## 2019-11-04 NOTE — PROGRESS NOTES
Behavioral Health Community Health Worker  Follow-Up  Completed by:  Brittny Jean    Date:  11/4/2019    Patient Enrollment in Behavioral Health Program:  · Raffy was enrolled in the Behavioral Health Program on November 14, 2019.     Assessments     Promis 10:       Depression PHQ:  PHQ9 10/30/2019   Total Score 13       Generalized Anxiety Disorder 7-Item Scale:  GAD7 10/30/2019   DARLINE-7 Score 7       Patients' GlobaI mpression of Change (PGIC) Scale:  Since beginning treatment at this clinic, how would you describe the change (if any) in ACTIVITY LIMITATIONS, SYMPTOMS, EMOTIONS, and LL QUALITY OF LIFE, related to your painful condition? (tick ONE box).  ·     In a similar way, please check the number below that matches your degree of change since beginning care at this clinic:  ·          Much Better                                     No Change                                    Much Worse                        -----------------------------------------------------------------------------                        0       1       2       3       4       5       6       7      8       9      10                     Call Summary

## 2019-11-04 NOTE — PROGRESS NOTES
"Self-Referred Patient to the Opioid BHI program. CHW contacted Mr. Rutherford who reports chronic pain that limits his activities of daily living (ADLs).   Patient scored "13" on the PHQ9 and "7" on the DARLINE 7.  Based on these scores patient is eligible for the Behavioral health Integration Program.  CHW completed the intake and scheduled an appointment for patient with Bean Alfred LCSW, on November 12 at 9:00 a.m.          Behavioral [facility] Community Health Worker  Initial Assessment  Completed by:  Brittny Jean    Date:  11/4/2019    Patient Enrollment in Behavioral Health Program:  · Patient verbalized understanding of Behavioral Health Integration services to include:  · Patient understands that CHW, LCSW, PharmD and consulting Psychiatrist are members of the care team working collaboratively with his/her primary care provider: Yes  · Patient understands that activation of their [facility] patient portal account is required for accessing the full scope of team services: Yes  · Patient understands that some counseling sessions may occur via video: Yes  · Patient understands that services will be provided by the CHW, LCSW, and PharmD at no charge for a LIMITED TIME: Yes  · Clinic visits with the psychiatrist may be subject to a co-pay based on your insurance: Yes  · Patient consents to enroll in BHI program: Yes    Assessments     Single Item Health Literacy Scale:  ·      Promis 10:  ·      Depression PHQ:  PHQ9 10/30/2019   Total Score 13         Generalized Anxiety Disorder 7-Item Scale:  GAD7 10/30/2019   DARLINE-7 Score 7       History     Social History     Socioeconomic History    Marital status:      Spouse name: Not on file    Number of children: Not on file    Years of education: Not on file    Highest education level: Not on file   Occupational History    Occupation: Psychotherpist    Social Needs    Financial resource strain: Not hard at all    Food insecurity:     Worry: Never true "     Inability: Never true    Transportation needs:     Medical: No     Non-medical: No   Tobacco Use    Smoking status: Never Smoker    Smokeless tobacco: Never Used   Substance and Sexual Activity    Alcohol use: Yes     Frequency: 4 or more times a week     Drinks per session: 1 or 2     Binge frequency: Never     Comment: occasion    Drug use: No    Sexual activity: Yes     Partners: Female   Lifestyle    Physical activity:     Days per week: 0 days     Minutes per session: Not on file    Stress: To some extent   Relationships    Social connections:     Talks on phone: More than three times a week     Gets together: Twice a week     Attends Hoahaoism service: Not on file     Active member of club or organization: Patient refused     Attends meetings of clubs or organizations: Patient refused     Relationship status:    Other Topics Concern    Not on file   Social History Narrative    Not on file       Call Summary

## 2019-11-05 ENCOUNTER — TELEPHONE (OUTPATIENT)
Dept: PHARMACY | Facility: CLINIC | Age: 69
End: 2019-11-05

## 2019-11-05 ENCOUNTER — TELEPHONE (OUTPATIENT)
Dept: NEUROSURGERY | Facility: CLINIC | Age: 69
End: 2019-11-05

## 2019-11-05 NOTE — TELEPHONE ENCOUNTER
Spoke with Mr Rutherford re: appt being r/s with PA on Friday-  prefers to keep scheduled appt with MD. Raman completed 10/25/19.

## 2019-11-06 ENCOUNTER — DOCUMENTATION ONLY (OUTPATIENT)
Dept: PRIMARY CARE CLINIC | Facility: CLINIC | Age: 69
End: 2019-11-06

## 2019-11-06 ENCOUNTER — OFFICE VISIT (OUTPATIENT)
Dept: NEUROSURGERY | Facility: CLINIC | Age: 69
End: 2019-11-06
Payer: COMMERCIAL

## 2019-11-06 DIAGNOSIS — Z98.890 S/P LUMBAR MICRODISCECTOMY: Primary | ICD-10-CM

## 2019-11-06 PROCEDURE — 99024 PR POST-OP FOLLOW-UP VISIT: ICD-10-PCS | Mod: S$GLB,,, | Performed by: NEUROLOGICAL SURGERY

## 2019-11-06 PROCEDURE — 99024 POSTOP FOLLOW-UP VISIT: CPT | Mod: S$GLB,,, | Performed by: NEUROLOGICAL SURGERY

## 2019-11-06 PROCEDURE — 99999 PR PBB SHADOW E&M-EST. PATIENT-LVL III: CPT | Mod: PBBFAC,,, | Performed by: NEUROLOGICAL SURGERY

## 2019-11-06 PROCEDURE — 99999 PR PBB SHADOW E&M-EST. PATIENT-LVL III: ICD-10-PCS | Mod: PBBFAC,,, | Performed by: NEUROLOGICAL SURGERY

## 2019-11-06 RX ORDER — PREGABALIN 25 MG/1
25 CAPSULE ORAL 2 TIMES DAILY
Qty: 60 CAPSULE | Refills: 5 | Status: SHIPPED | OUTPATIENT
Start: 2019-11-06 | End: 2019-11-27 | Stop reason: SDUPTHER

## 2019-11-07 NOTE — PROGRESS NOTES
[facility] pharmacist performed a medication reconciliation on the patient's controlled substances and psychotropic medications using the [place]  and [employer] prescription medication fills portal.    Patient has recently filled opioid prescriptions from multiple providers, though patient has not filled chronically (I.e., 90 days worth of medication in the last 4 months).  Patient filled a ten-day prescription for Hydrocodone-Acetamin 5-325 Mg six times daily on 1/11/19 following a rectal surgery, a three-day prescription for Hydrocodone-Acetamin 7.5-325 mg five times daily on 6/19/19, a four-day prescription for Hydrocodone-Acetamin 7.5-325 mg three times daily following a dental procedure, a seven-day prescription for Hydrocodone-Acetamin 5-325 Mg three times daily on 9/19/19, a 30-day prescription for Hydrocodone-Acetamin 7.5-325 mg twice daily on 10/17/19, and a two-day prescription for Hydromorphone 2 Mg tablets five times daily on 10/26/19 following a hospitalization at [facility].  Patient also fills concomitant clonazepam 0.5 mg twice daily every month + Yjejga-Hrrztuin-Iowz -40 mg intermittently. Patient is deemed to be at moderate risk of overdose per  records due to prescriptions from multiple providers and concomitant use of other CNS-depressing agents.  Co-prescription of naloxone should be considered.  [facility] pharmacist will follow up with patient.    Patient is currently filling no psychotropic medications per [employer] reports.  Patient had filled previous regimen of lamotrigine 200 mg daily ending around 1/13/19, selegiline 12 mg patch (Emsam patch - MAO-I) daily ending around 12/1/18, and lithium 300 mg daily ending around 10/17/18.  [facility] pharmacist has not confirmed adherence by means of contacting the patient, though will follow up with patient on medication adherence.    Plan:  1. [facility] pharmacist to contact patient about potentially filling Narcan 4 mg nasal spray, as well as psychotropic medication  adherence    Olayinka Alcantara, PharmD, BCPP  [facility] Pharmacist

## 2019-11-07 NOTE — PROGRESS NOTES
NEUROSURGICAL POST-OPERATIVE PROGRESS NOTE    DATE OF SERVICE:  11/07/2019      ATTENDING PHYSICIAN:  Jason Caldwell MD    SUBJECTIVE:    INTERIM HISTORY:    This is a very pleasant 69 y.o. y.o. male, who is status right L5-S1 microdiskectomy less than 2 weeks ago.  Patient reports significant improvement in his right leg pain but complains the significant incisional pain.  He reports that his right footdrop has improved since the surgery.  Also his headaches have improved compared to before surgery.  Has been compliant with his activity restrictions.      Low Back Pain Scale  R Low Back-Pain Score: 8  R Low Back-Pain Intensity: Pain killers give moderate relief from pain  R Low Back-Pain Score: I need some help, but manage most of my personal care  Low Back-Lifting: I cannot lift or carry anything at all   Low Back-Walking: Pain prevents me walking more than .25 mile   Low Back-Sitting: Pain prevents me from sitting more than than 10 minutes   Low Back-Standing: I cannot stand for longer than 10 minutes with increasing pain   Low Back-Sleeping: Because of pain my normal nights sleep is reduced by less than one quarter   Low Back-Social Life: Pain has restricted my social life and I do not go out very often   Low Back-Traveling: Pain restricts me to short necessary journeys under 30 minutes   Low Back-Changing Degree of Pain: My pain seems to be getting better but improvement is slow         OBJECTIVE:    PHYSICAL EXAMINATION:   There were no vitals filed for this visit.    Neurosurgery Physical Exam    Back Exam     Muscle Strength   Right Quadriceps:  5/5   Left Quadriceps:  5/5             Neurologic Exam     Motor Exam     Strength   Right iliopsoas: 5/5  Left iliopsoas: 5/5  Right quadriceps: 5/5  Left quadriceps: 5/5  Right anterior tibial: 4/5  Left anterior tibial: 5/5  Right gastroc: 4/5  Left gastroc: 5/5      Wound has healed.    DIAGNOSTIC DATA:  NA  ASSESMENT:    This is a 69 y.o. male who is s/p right  L5-S1 microdiskectomy with significant improvement of right leg pain    Problem List Items Addressed This Visit     None      Visit Diagnoses     S/P lumbar microdiscectomy    -  Primary          PLAN:    Okay to resume physical therapy and 1 week  Okay to start driving  All questions answered  Follow-up in 4 weeks        Jason Caldwell MD  Pager: [phone]

## 2019-11-11 ENCOUNTER — TELEPHONE (OUTPATIENT)
Dept: PRIMARY CARE CLINIC | Facility: CLINIC | Age: 69
End: 2019-11-11

## 2019-11-12 ENCOUNTER — CLINICAL SUPPORT (OUTPATIENT)
Dept: PRIMARY CARE CLINIC | Facility: CLINIC | Age: 69
End: 2019-11-12
Payer: COMMERCIAL

## 2019-11-12 DIAGNOSIS — G43.711 CHRONIC MIGRAINE WITHOUT AURA, WITH INTRACTABLE MIGRAINE, SO STATED, WITH STATUS MIGRAINOSUS: ICD-10-CM

## 2019-11-12 DIAGNOSIS — M19.90 OTHER TYPE OF OSTEOARTHRITIS, UNSPECIFIED SITE: ICD-10-CM

## 2019-11-12 DIAGNOSIS — M25.531 PAIN IN RIGHT WRIST: ICD-10-CM

## 2019-11-12 DIAGNOSIS — M25.532 PAIN IN LEFT WRIST: ICD-10-CM

## 2019-11-12 DIAGNOSIS — M54.16 LUMBAR BACK PAIN WITH RADICULOPATHY AFFECTING LOWER EXTREMITY: ICD-10-CM

## 2019-11-12 DIAGNOSIS — F33.1 MDD (MAJOR DEPRESSIVE DISORDER), RECURRENT EPISODE, MODERATE: Primary | ICD-10-CM

## 2019-11-12 DIAGNOSIS — M54.2 CHRONIC NECK PAIN: ICD-10-CM

## 2019-11-12 DIAGNOSIS — K21.9 GASTROESOPHAGEAL REFLUX DISEASE WITHOUT ESOPHAGITIS: ICD-10-CM

## 2019-11-12 DIAGNOSIS — M79.7 FIBROMYALGIA: ICD-10-CM

## 2019-11-12 DIAGNOSIS — G89.29 CHRONIC NECK PAIN: ICD-10-CM

## 2019-11-12 DIAGNOSIS — R45.89 ANXIETY ABOUT HEALTH: ICD-10-CM

## 2019-11-12 DIAGNOSIS — G89.29 OTHER CHRONIC PAIN: ICD-10-CM

## 2019-11-12 DIAGNOSIS — G47.30 SLEEP APNEA, UNSPECIFIED TYPE: ICD-10-CM

## 2019-11-12 DIAGNOSIS — R26.2 DIFFICULTY WALKING: ICD-10-CM

## 2019-11-12 DIAGNOSIS — R51.9 HEADACHE AROUND THE EYES: ICD-10-CM

## 2019-11-12 PROBLEM — F41.8 ANXIETY ABOUT HEALTH: Status: ACTIVE | Noted: 2019-11-12

## 2019-11-12 PROCEDURE — 90791 PR PSYCHIATRIC DIAGNOSTIC EVALUATION: ICD-10-PCS | Mod: BHI,S$GLB,, | Performed by: SOCIAL WORKER

## 2019-11-12 PROCEDURE — 90791 PSYCH DIAGNOSTIC EVALUATION: CPT | Mod: BHI,S$GLB,, | Performed by: SOCIAL WORKER

## 2019-11-12 NOTE — PROGRESS NOTES
The patient location is:  Patient Home   The chief complaint leading to consultation is: Chronic pain, depression, and anxiety.   Visit type: Telephone   Total time spent with patient: 60 mins   Each patient to whom he or she provides medical services by telemedicine is:  (1) informed of the relationship between the physician and patient and the respective role of any other health care provider with respect to management of the patient; and (2) notified that he or she may decline to receive medical services by telemedicine and may withdraw from such care at any time.      [facility] BEHAVIORAL HEALTH INTEGRATION INTAKE    DATE:  11/14/2019  REFFERAL SOURCE:  Nini Rendon MD  TYPE OF VISIT:  Telephone   LENGTH OF SESSION:  60 minutes    HISTORY OF PRESENTING ILLNESS:  Raffy Rutherford Jr. is a 69 y.o. male with history of Chronic pain (back, hip, knee, wrist), Fibromyalgia, Chronic headaches, Chronic depression, and anxiety.       Patient does currently have a psychiatrist. Dr. Amadou Dennis   Patient does not currently have a therapist.    Patient is currently taking Lamictal 200mg once per day for headaches, Lyrica 25mg twice per day (has not started yet) for pain, Trazodone 100mg once per night for sleep, Klonopin 0.5mg (1-2 tablets at night for sleep).  They are not interested in medication changes.  Patient is currently taking Hydrocodone 7.5mg every 4 hours for pain.    Current symptoms:  Depression:  Positive for depressed mood, anhedonia, insomnia, feelings of worthlessness/guilt, difficulty concentrating, anxiety and disturbed sleep.  Anxiety:  Positive for difficulty concentrating, fatigue, feelings of losing control, insomnia, irritable.  Insomnia:  Positive for early morning awakening, frequent night time awakening, difficulty falling asleep and non-restful sleep.  Diane:  Denies  Psychosis:  Denies    PHQ9 10/30/2019   Total Score 13     GAD7 10/30/2019   DARLINE-7 Score 7        Current social stressors:   PT  recently has surgery on his L5, PT went on a trip and had spend most of his time in a wheelchair in public, this was his first experience. 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      Risk assessment:  Patient reports no suicidal ideation  Patient reports no homicidal ideation  Patient reports no self-injurious behavior  Patient reports no violent behavior    PSYCHIATRIC HISTORY:  History of Diane or diagnosis of Bipolar Disorder in the past:  No  History of Psychosis or diagnosis of Schizophrenia in the past:  No  Previous Psychiatric Hospitalizations:  No  Previous SI/HI:   Yes - Passive Morbid thinking about what it would be like not to be here but never had active thoughts of harming himself.   Previous Suicide Attempts:  No  Previous Medication Trials: Yes - Lamictal at the highest does made PT hypomanic.   Previous Psychiatric Outpatient Treatment:  Yes - Dr. Amadou Dennis   History of Trauma:  No  History of Violence:  No  Access to a Gun:  No    SUBSTANCE ABUSE HISTORY:  Tobacco:  No  Alcohol:  Yes - 2-3 glasses of wine per night helps his back pain more than anything.   Illicit Substances: No  Misuse of Prescription Medications:  No    Last COM-9 score: COMM-9 Assessment  In the past 30 days, how often have you had trouble with thinking clearly or had memory problems?: Never  In the past 30 days, how often have you had to go to someone other than your prescribing physician to get sufficient pain relief from your medications? (i.e., another doctor, the emergency room): Never  In the past 30 days, how often have you seriously thought about hurting yourself?: Never  In the past 30 days, how much of your time was spent thinking about opioid medications (having enough, taking them, dosing schedule, etc.)?: Never  In the past 30 days, how often have you needed to take pain medications belonging to someone else?: Never  In the past 30 days, how  often have you had to take more of your medication than prescribed?: Seldom  In the past 30 days, how often have you used your pain medicine for symptoms other than for pain (e.g., to help you sleep, improve your mood, or relieve stress)?: Never  In the past 30 days, how often have you had to visit the emergency room?: Never     PEG-3 Assessment 11/12/2019   What number best described your pain on average in the past week? 8   What number best describes how, during the past week, pain has interfered with your enjoyment of life? 8   What number best describes how, during the past week, pain has interfered with your general activity? 8   Score 24       MEDICAL HISTORY:  Past Medical History:   Diagnosis Date    Adenomatous polyp 2003    Adenomatous polyp     Allergy     Arthritis     Back pain     after trauma beginning in 89    Cataract     Chronic pain     neck and left shoulder    Cluster headache 2013    Colon polyp     Degenerative disc disease     Depression     Diverticulitis 12/2013    Elevated PSA     Fibromyalgia 2013    GERD (gastroesophageal reflux disease)     Hepatitis 1970's    A    History of prostate biopsy 2002    Hyperlipidemia     Joint pain     Sleep apnea     Special screening for malignant neoplasms, colon 5/5/2014    Thyroid nodule 7/16/2014    Visual disturbance 2012    problems after cataract surgery       NEUROLOGIC HISTORY:  Seizures:  No  Head trauma:  No  Memory loss:  No    SOCIAL HISTORY:  PT states he has dealt with chronic depression of all of his life. He was on SSDI for over 10 years due to severe depression. PT has experienced chronic pain since his late 20's, PT reports that a sofa fell off the third floor on him when he had friends helping him move. Since then he has had knee and a total hip replacement. 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 After his depression PT went back to school for counseling. 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 PT states he will order the workbook "living beyond your pain" to review with LCSW. PT states he has had medications,  injections, PT, and surgery to help with his pain. 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 PT has no SI/HI/AVH during time of session.     PSYCHIATRIC FAMILY HISTORY:  PT states his father was an alcoholic     PSYCHIATRIC EXAM:     IMPRESSION:   My diagnostic impression is MDD recurrent moderate, anxiety about health, R/O PD, R/O BP.    PROVISIONAL DIAGNOSES:  1. MDD (major depressive disorder), recurrent episode, moderate    2. Anxiety about health    3. Other chronic pain    4. Chronic migraine without aura, with intractable migraine, so stated, with status migrainosus    5. Difficulty walking    6. Lumbar back pain with radiculopathy affecting lower extremity    7. Sleep apnea, unspecified type    8. Pain in right wrist    9. Pain in left wrist    10. Chronic neck pain    11. Headache around the eyes    12. Gastroesophageal reflux disease without esophagitis    13. Fibromyalgia    14. Other type of osteoarthritis, unspecified site         PLAN:  LCSW and PT will utilize CBT, Mindfulness, and ACT to guide PT's work.     RETURN TO CLINIC: Follow up in about 2 weeks (around 11/26/2019), or if symptoms worsen or fail to improve.     This patient is felt to be high risk due to a complex psychiatric diagnosis.  The patient's medications are currently being managed by their psychiatrist, Dr. Amadou Dennis.  This patient will be discussed at upcoming Behavioral Health Integration Case Review for further recommendations.  Patient's chart will also be forwarded to the Behavioral Health Integration pharmacist for medication review.  Today's note CC'ed to PCP, PharmD, and patient's psychiatrist/therapist.      "

## 2019-11-14 ENCOUNTER — PATIENT MESSAGE (OUTPATIENT)
Dept: INTERNAL MEDICINE | Facility: CLINIC | Age: 69
End: 2019-11-14

## 2019-11-14 ENCOUNTER — PATIENT MESSAGE (OUTPATIENT)
Dept: NEUROSURGERY | Facility: CLINIC | Age: 69
End: 2019-11-14

## 2019-11-14 DIAGNOSIS — Z98.890 S/P LUMBAR MICRODISCECTOMY: Primary | ICD-10-CM

## 2019-11-14 PROBLEM — F41.9 ANXIETY: Status: ACTIVE | Noted: 2019-11-14

## 2019-11-14 NOTE — PROGRESS NOTES
I, Faith Camargo MD, have reviewed the LCSW's history and exam, and I agree with the assessment and plan.  Patient will continue to see his psychiatrist for medication adjustments.  He will follow with LCSW every 2 weeks for Acceptance and Commitment Therapy.

## 2019-11-15 ENCOUNTER — TELEPHONE (OUTPATIENT)
Dept: PHARMACY | Facility: CLINIC | Age: 69
End: 2019-11-15

## 2019-11-16 NOTE — TELEPHONE ENCOUNTER
Initial Emgality consult completed on 11/15. Emgality 240mg (loading dose) will be shipped on  to arrive at patient's home on  via [employer]. $ 0 copay. Patient intends to start Emgality towards the end of the month. Address confirmed. Confirmed 2 patient identifiers - name and . Therapy Appropriate.    Indication: Migraine prophlaxis  goals of treatment: reduction in migraine frequency, intensity, and/or duration.     --Injection experience: none  Informed patient on online injection video on  website.     Store in refrigerator prior to use (do not freeze, do not shake, keep in original box until use).    Counseled patient on administration directions:  - Dose: Inject two injections (240mg) into the skin as a loading dose, followed by one injection (120mg) every 4 weeks thereafter.   - Dose appropriate for diagnosis of Episodic migraine without status migrainosus, not intractable  - Advised patient to keep a calendar to stay compliant.   - Take out of the refrigerator 30-60 minutes prior to injection to reach room temperature.  - Wash hands before and after injection.  - Monthly RX will come with gauze, band aids, and alcohol swabs.  - Patient may self-inject in either the front/top of the thighs, abdomen- but at least 2 inches away from belly button   - If someone else is giving the injection, they may also use the outer part of your upper arm or your buttocks.   - If injecting 2 pens for the loading dose, use 2 different injection sites.   - Patient was instructed to rotate injections sites monthly.  - Inspect medication - should be clear and colorless to slightly yellow to slightly brown.   - Patient is to wipe down the injection site with the alcohol pad, wait to dry.    - Remove white base cap from pen (twist to remove).  - Place the pen flat against the injection site and turn the lock ring to the unlocked position then push down and hold the teal button - there will be an initial click;  in 10 seconds you will hear a second click.  - Remove the pen from skin and check to see if the gray plunger is visible - this will indicate that the injection is complete.   - Patient will use sharps container; once full, per state law, she/he may securely lock the sharps container and place in trash. Pharmacy will replace the sharps at no additional charge.    Patient was counseled on possible side effects:  - Injection site reaction: redness, soreness, itching, bruising, which should resolve within 3-5 days.  - Allergic reactions: itching, rash, hives, swelling of face, mouth, tongue, throat, trouble breathing.     Allergy/Medication Review: Comorbidities, allergies, and medical history on file reviewed. Medication list reviewed (updates: none); no DDIs with Emgality    Consultation included the importance of compliance and of keeping all follow up appointments.  Patient understands to report any medication changes to [facility] and provider. All questions answered and addressed to patients satisfaction. RPh will touchbase with pt in 7 days and [facility] will contact patient in 3 weeks for refills.    Ha Ogden, PharmD  Clinical Pharmacist   [facility]   P: [phone]

## 2019-11-20 ENCOUNTER — CLINICAL SUPPORT (OUTPATIENT)
Dept: REHABILITATION | Facility: HOSPITAL | Age: 69
End: 2019-11-20
Attending: NEUROLOGICAL SURGERY
Payer: COMMERCIAL

## 2019-11-20 DIAGNOSIS — R29.898 WEAKNESS OF BOTH LOWER EXTREMITIES: ICD-10-CM

## 2019-11-20 PROCEDURE — 97161 PT EVAL LOW COMPLEX 20 MIN: CPT

## 2019-11-20 NOTE — PLAN OF CARE
[facility]  Physical Therapy Initial Evaluation    Name: Raffy Rutherford Jr.  Clinic Number: [id number]    Therapy Diagnosis:   Encounter Diagnosis   Name Primary?    Weakness of both lower extremities      Physician: Jason Caldwell MD    Physician Orders: PT Eval and Treat pool therapy  Medical Diagnosis from Referral:Z98.890 (ICD-10-CM) - S/P lumbar microdiscectomy  Evaluation Date: 11/20/2019  Authorization Period Expiration: 12/31/2019  Plan of Care Expiration: 2/12/2020  Visit # / Visits authorized: 1/ 15  Total Visits: 1    Time In: 1500  Time Out: 1530  Total Billable Time: 30 minutes(eval)    Precautions: fibromyalgia  Procedures: Right MIS approach to the L5-S1  level  2.  L5-S1 laminotomy and microdiscectomy on 10/25/2019  Subjective   Date of onset: chronic back pain for years  History of current condition - Raffy reports: 10/2019 increased back pain after ALYSON; had L5 microdiskectomy with some relief of radicular pain; back to work but taking breaks between clients; gets most relief of pain with sidelying; sleeps sidelying with pillow between knees; restarted driving 11/19/2019;        Past Medical History:   Diagnosis Date    Adenomatous polyp 2003    Adenomatous polyp     Allergy     Arthritis     Back pain     after trauma beginning in 195    Cataract     Chronic pain     neck and left shoulder    Cluster headache 2013    Colon polyp     Degenerative disc disease     Depression     Diverticulitis 12/2013    Elevated PSA     Fibromyalgia 2013    GERD (gastroesophageal reflux disease)     Hepatitis 1970's    A    History of prostate biopsy 2002    Hyperlipidemia     Joint pain     Sleep apnea     Special screening for malignant neoplasms, colon 5/5/2014    Thyroid nodule 7/16/2014    Visual disturbance 2012    problems after cataract surgery     Raffy Rutherford Jr.  has a past surgical history that includes Sinus surgery; Knee surgery; Hemorrhoid  surgery; Cholecystectomy; Sinus surgery; Eye surgery; Back surgery; Joint replacement; Total hip arthroplasty (2012); Cosmetic surgery (2/10/2015); Cosmetic surgery (2/10/2015); Spine surgery (6/22/15); Cataract extraction w/  intraocular lens implant (Bilateral); Spinal fusion (2015); Injection of steroid (Right, 2018); and Injection of steroid (Right, 2019).    Raffy has a current medication list which includes the following prescription(s): butalbital-acetaminophen-caffeine -40 mg, celecoxib, clonazepam, diclofenac sodium, galcanezumab-gnlm, galcanezumab-gnlm, hydrocodone-acetaminophen, hydrocodone-acetaminophen, indomethacin, lamotrigine, pravastatin, pregabalin, rabeprazole, selegiline, senna-docusate 8.6-50 mg, sucralfate, and trazodone.    Review of patient's allergies indicates:   Allergen Reactions    Alphagan [brimonidine]      Patient taking MASO-B Selective Inhibitor Selegiline (Emsam)    Coumadin [warfarin]      itch    Oxycodone      hiccups    Pennsaid [diclofenac sodium] Rash        Imaging, MRI 10/17/2019 No evidence for sacroiliitis.  2. Large disc extrusion at L5-S1 with cranial migration and compression of the right L5 nerve root.    Prior Therapy: prior land & aquatic therapy  Social History:  lives with their spouse   Occupation: psychotherapist  Prior Level of Function: I with gait & ADLs without AD, driving, working  Current Level of Function: using cane for mobility; increased pain with distance; difficulty dressing lower body    Pain:  Current 8/10, worst 10/10, best 6/10   Location: bilateral back R>L  Description: Burning, Sharp and Shooting  Aggravating Factors: Sitting, Walking and Getting out of bed/chair  Easing Factors: lying down,pain medication    Pts goals: get stronger, move better, stop using the cane    Objective     TU sec B hands on chair arms & no assistive device  FTSTS: 11 sec - only able to complete 1 due to pain  Observation: walks into  clinic using cane    Posture:  Rounded shoulders & slight forward flexion trunk  Gait: can walk very short distance without AD but decreased sudeep, increased R hip/knee flexion 2/2 R foot drop, foot slapping B Feet R>L, increased lateral sway    Lumbar Range of Motion: not assess 2/2 patient still has post surgical restrictions        Lower Extremity Strength  Right LE  Left LE    Knee extension: 4-/5 Knee extension: 4+/5   Knee flexion: 4-/5 Knee flexion: 4+/5   Hip flexion: 3+/5 Hip flexion: 4-/5   Hip extension:  3+/5 Hip extension: 4-/5   Hip abduction: 3+/5 Hip abduction: 4-/5   Hip adduction: 4-/5 Hip adduction 4-/5   Ankle dorsiflexion: 2/5 Ankle dorsiflexion: 4-/5   Ankle plantarflexion: 3+/5 Ankle plantarflexion: 4-/5         Neuro Dynamic Testing:    Sciatic nerve:      SLR: R = +     L = -          Joint Mobility: hypomobile lumbar paraspinals & pelvis    Palpation: TTP R piriformis, R greater trochanter    Sensation: decreased light touch RLE    Flexibility:    Olayinka test: R = + ; L = +   Hamstring 90/90: 50 degrees form neutral R, 20 degrees from neutral L      CMS Impairment/Limitation/Restriction for FOTO lumbar spine Survey    Therapist reviewed FOTO scores for Raffy Rutherford Jr. on 11/20/2019.   FOTO documents entered into [employer] - see Media section.    Limitation Score: 70%  Category: Other    Current : CL = least 60% but < 80% impaired, limited or restricted  Goal: CK = at least 40% but < 60% impaired, limited or restricted                 Home Exercises and Patient Education Provided    Education provided:   Role of aquatic therapy      Written Home Exercises Provided:none    Assessment   Raffy is a 69 y.o. male referred to outpatient Physical Therapy with a medical diagnosis of S/P lumbar discectomy. Pt presents with decreased strength, impaired mobility, pain, decreased endurance,  which impacts the patient's ability to complete functional tasks & activities safely & timely.    Pt prognosis  is Fair.   Pt will benefit from skilled aquatic outpatient Physical Therapy to address the deficits stated above and in the chart below, provide pt/family education, and to maximize pt's level of independence.     Plan of care discussed with patient: Yes  Pt's spiritual, cultural and educational needs considered and patient is agreeable to the plan of care and goals as stated below:     Anticipated Barriers for therapy: chronic back pain    Medical Necessity is demonstrated by the following  History  Co-morbidities and personal factors that may impact the plan of care Co-morbidities:   prior lumbar surgery    Personal Factors:   no deficits     low   Examination  Body Structures and Functions, activity limitations and participation restrictions that may impact the plan of care Body Regions:   back  lower extremities    Body Systems:    strength  gait  transfers    Participation Restrictions:   none    Activity limitations:   Learning and applying knowledge  no deficits    General Tasks and Commands  no deficits    Communication  no deficits    Mobility  lifting and carrying objects  walking  driving (bike, car, motorcycle)    Self care  dressing    Domestic Life  shopping  cooking  doing house work (cleaning house, washing dishes, laundry)    Interactions/Relationships  no deficits    Life Areas  no deficits    Community and Social Life  no deficits         low   Clinical Presentation stable and uncomplicated low   Decision Making/ Complexity Score: low     Goals:  Short Term Goals: 6 weeks   1. Patient will be independent in HEP & progressions  2. Patient will achieve TUG score of 14 sec or less to demonstrate improved mobility    Long Term Goals: 12 weeks   1. Patient will have FOTO limitation score of 55% to demonstrate improved overall function & decreased pain  2. Patient will achieve FTSTS score of 20 sec & be able to perform all 5 sit/stand  3. Patient will increase R hip/knee MMT to 4/5 to demonstrate  improved strength  4. Patient will increase R ankle df to 3+/5 to demonstrate improved strength & decreased gait deviations    Plan   Plan of care Certification: 11/20/2019 to 2/12/2020.    Outpatient aquatic Physical Therapy 1-2 times weekly for  12 weeks to include the following interventions: manual therapy, aquatic therapy, patient education, therapeutic exercise, therapeutic activities  Patient may be seen by PTA as part of rehabilitation team      Antonia Ca, PT

## 2019-11-25 ENCOUNTER — PATIENT MESSAGE (OUTPATIENT)
Dept: NEUROSURGERY | Facility: CLINIC | Age: 69
End: 2019-11-25

## 2019-11-26 NOTE — PROGRESS NOTES
Physical Therapy Daily Treatment Note     Name: Raffy Rutherford Jr.  Clinic Number: [id number]    Therapy Diagnosis:   Encounter Diagnosis   Name Primary?    Weakness of both lower extremities      Physician: Jason Caldwell MD    Visit Date: 11/27/2019     Physician Orders: PT Eval and Treat pool therapy  Medical Diagnosis from Referral:Z98.890 (ICD-10-CM) - S/P lumbar microdiscectomy  Evaluation Date: 11/20/2019  Authorization Period Expiration: 12/31/2019  Plan of Care Expiration: 2/12/2020  Visit # / Visits authorized: 2/ 15  Total Visits: 2    Time In: 1500  Time Out: 1600  Total Billable Time: 60 minutes    Precautions: fibromyalgia    PROCEDURES/IMAGING: 10/25/2019: R MIS L5-s1 microdisectomy  Subjective     Pt reports: I'm trying to walk wihtout the cane  He was compliant with home exercise program.  Response to previous treatment: 1st pool treatment  Functional change: not using cane for mobility    Pain: 7/10  Location: right back      Objective     Raffy received aquatic therapeutic exercises to develop strength, endurance, ROM, flexibility, posture and core stabilization for 60 minutes including:    WARMUP x 2 laps each  Walk fwd/back/side    STRETCHES 2 x 20sec  Hamstring  Hip Flexor  Gastroc    LE EX x 20  Mini squat  Heel raise with GS  Hip abd/add  Hip flex/ext  LAQ with hip flex  HS curls  Step up forward x 10 each LE  Step up lateral x 10 each LE    UE/CORE EX x 20  Rows red tubing - 2 sec hold  Shoulder extension red tubing  Truncal rotation double strand red tubing  Mini squat hold with chest press blue disc    ENDURANCE  Bicycle // bars x 3 min    COOL DOWN x 1 lap each  Walk fwd/back/side      Home Exercises Provided and Patient Education Provided     Education provided:   Role of aquatic therapy  Hydration post therapy      Written Home Exercises Provided: yes.  Exercises were reviewed and Raffy was able to demonstrate them prior to the end of the session.  Raffy demonstrated good   understanding of the education provided.     See EMR under Patient Instructions for exercises provided 11/27/2019.    Assessment   Patient arrived @ therapy without cane but with significant trendelenberg gait  Patient required frequent verbal cues  for proper technique, movement in pain free range & core stabilization. Patient did well post instruction. Patient will benefit from aquatic environment to unload  spine for strengthening, core stabilization & postural awareness.      Rfafy is progressing well towards his goals.   Pt prognosis is Fair.     Pt will continue to benefit from skilled aquatic outpatient physical therapy to address the deficits listed in the problem list box on initial evaluation, provide pt/family education and to maximize pt's level of independence in the home and community environment.     Pt's spiritual, cultural and educational needs considered and pt agreeable to plan of care and goals.    Anticipated barriers to physical therapy: chronic pain    Goals:   Short Term Goals: 6 weeks   1. Patient will be independent in HEP & progressions (progressing)  2. Patient will achieve TUG score of 14 sec or less to demonstrate improved mobility (progressing)     Long Term Goals: 12 weeks   1. Patient will have FOTO limitation score of 55% to demonstrate improved overall function & decreased pain (progressing)  2. Patient will achieve FTSTS score of 20 sec & be able to perform all 5 sit/stand (progressing)  3. Patient will increase R hip/knee MMT to 4/5 to demonstrate improved strength (progressing)  4. Patient will increase R ankle df to 3+/5 to demonstrate improved strength & decreased gait deviations (progressing)        Plan   Increase  exercises slowly to prevent pain    Plan of care Certification: 11/20/2019 to 2/12/2020.     Outpatient aquatic Physical Therapy 1-2 times weekly for  12 weeks to include the following interventions: manual therapy, aquatic therapy, patient education, therapeutic  exercise, therapeutic activities  Patient may be seen by PTA as part of rehabilitation team    Antonia Ca, PT

## 2019-11-27 ENCOUNTER — CLINICAL SUPPORT (OUTPATIENT)
Dept: REHABILITATION | Facility: HOSPITAL | Age: 69
End: 2019-11-27
Attending: NEUROLOGICAL SURGERY
Payer: COMMERCIAL

## 2019-11-27 DIAGNOSIS — R29.898 WEAKNESS OF BOTH LOWER EXTREMITIES: ICD-10-CM

## 2019-11-27 PROBLEM — M54.16 LUMBAR BACK PAIN WITH RADICULOPATHY AFFECTING LOWER EXTREMITY: Status: RESOLVED | Noted: 2018-11-15 | Resolved: 2019-11-27

## 2019-11-27 PROBLEM — M25.552 LEFT HIP PAIN: Status: RESOLVED | Noted: 2017-05-24 | Resolved: 2019-11-27

## 2019-11-27 PROBLEM — Z74.09 IMPAIRED FUNCTIONAL MOBILITY, BALANCE, GAIT, AND ENDURANCE: Status: RESOLVED | Noted: 2017-02-24 | Resolved: 2019-11-27

## 2019-11-27 PROBLEM — R53.1 WEAKNESS: Status: RESOLVED | Noted: 2017-11-01 | Resolved: 2019-11-27

## 2019-11-27 PROBLEM — R26.2 DIFFICULTY WALKING: Status: RESOLVED | Noted: 2017-11-01 | Resolved: 2019-11-27

## 2019-11-27 PROBLEM — R26.9 GAIT ABNORMALITY: Status: RESOLVED | Noted: 2017-02-21 | Resolved: 2019-11-27

## 2019-11-27 PROCEDURE — 97113 AQUATIC THERAPY/EXERCISES: CPT

## 2019-11-27 RX ORDER — PREGABALIN 50 MG/1
50 CAPSULE ORAL 2 TIMES DAILY
Qty: 60 CAPSULE | Refills: 5 | Status: SHIPPED | OUTPATIENT
Start: 2019-11-27 | End: 2020-06-20

## 2019-11-29 ENCOUNTER — CLINICAL SUPPORT (OUTPATIENT)
Dept: REHABILITATION | Facility: HOSPITAL | Age: 69
End: 2019-11-29
Attending: NEUROLOGICAL SURGERY
Payer: COMMERCIAL

## 2019-11-29 DIAGNOSIS — R29.898 WEAKNESS OF BOTH LOWER EXTREMITIES: ICD-10-CM

## 2019-11-29 PROCEDURE — 97113 AQUATIC THERAPY/EXERCISES: CPT

## 2019-11-29 NOTE — PROGRESS NOTES
Physical Therapy Daily Treatment Note     Name: Raffy Rutherford Jr.  Clinic Number: [id number]    Therapy Diagnosis:   Encounter Diagnosis   Name Primary?    Weakness of both lower extremities      Physician: Jason Caldwell MD    Visit Date: 11/29/2019     Physician Orders: PT Eval and Treat pool therapy  Medical Diagnosis from Referral:Z98.890 (ICD-10-CM) - S/P lumbar microdiscectomy  Evaluation Date: 11/20/2019  Authorization Period Expiration: 12/31/2019  Plan of Care Expiration: 2/12/2020  Visit # / Visits authorized: 3/ 15  Total Visits: 3    Time In: 1100  Time Out: 1200  Total Billable Time: 15 minutes    Precautions: fibromyalgia    PROCEDURES/IMAGING: 10/25/2019: R MIS L5-s1 microdisectomy  Subjective     Pt reports: The cane helps with the walking but I still have pain in my R back & hip  He was compliant with home exercise program.  Response to previous treatment: muscle soreness x 2 days  Functional change: not using cane for mobility    Pain: 7/10  Location: right back      Objective     Raffy received aquatic therapeutic exercises to develop strength, endurance, ROM, flexibility, posture and core stabilization for 60 minutes including:    WARMUP x 2 laps each  Walk fwd/back/side    STRETCHES 2 x 20sec    Hamstring  Hip Flexor  Gastroc    LE EX x 20  Mini squat  Heel raise with GS  Hip abd/add  Hip flex/ext  LAQ with hip flex  HS curls  Step up forward x 10 each LE  Step up lateral x 10 each LE    UE/CORE EX x 20  Rows red tubing - 2 sec hold  Shoulder extension red tubing  Truncal rotation double strand red tubing  Mini squat hold with chest press blue disc    ENDURANCE  Bicycle // bars x 4 min    COOL DOWN x 1 lap each  Walk fwd/back/side      Home Exercises Provided and Patient Education Provided     Education provided:   Role of aquatic therapy  Hydration post therapy      Written Home Exercises Provided: yes.  Exercises were reviewed and Raffy was able to demonstrate them prior to the end of the  session.  Raffy demonstrated good  understanding of the education provided.     See EMR under Patient Instructions for exercises provided 11/27/2019.    Assessment   Patient arrived @ therapy using cane  Patient required intermittent verbal cues  for proper technique, movement in pain free range & core stabilization. Patient did well post instruction. Slight increase L back pain with lateral steps ups. Patient will benefit from aquatic environment to unload  spine for strengthening, core stabilization & postural awareness.      Raffy is progressing well towards his goals.   Pt prognosis is Fair.     Pt will continue to benefit from skilled aquatic outpatient physical therapy to address the deficits listed in the problem list box on initial evaluation, provide pt/family education and to maximize pt's level of independence in the home and community environment.     Pt's spiritual, cultural and educational needs considered and pt agreeable to plan of care and goals.    Anticipated barriers to physical therapy: chronic pain    Goals:   Short Term Goals: 6 weeks   1. Patient will be independent in HEP & progressions (progressing)  2. Patient will achieve TUG score of 14 sec or less to demonstrate improved mobility (progressing)     Long Term Goals: 12 weeks   1. Patient will have FOTO limitation score of 55% to demonstrate improved overall function & decreased pain (progressing)  2. Patient will achieve FTSTS score of 20 sec & be able to perform all 5 sit/stand (progressing)  3. Patient will increase R hip/knee MMT to 4/5 to demonstrate improved strength (progressing)  4. Patient will increase R ankle df to 3+/5 to demonstrate improved strength & decreased gait deviations (progressing)        Plan   Increase  exercises slowly to prevent pain    Plan of care Certification: 11/20/2019 to 2/12/2020.     Outpatient aquatic Physical Therapy 1-2 times weekly for  12 weeks to include the following interventions: manual  therapy, aquatic therapy, patient education, therapeutic exercise, therapeutic activities  Patient may be seen by PTA as part of rehabilitation team    Antonia Ca, PT

## 2019-12-02 ENCOUNTER — TELEPHONE (OUTPATIENT)
Dept: PRIMARY CARE CLINIC | Facility: CLINIC | Age: 69
End: 2019-12-02

## 2019-12-02 NOTE — PROGRESS NOTES
MERCEDES Samaniego contacted Mr. Rutherford to complete his assessments and update appt.  Mr. Rutherford was busy.  CHW will call back on Wednesday.

## 2019-12-03 ENCOUNTER — CLINICAL SUPPORT (OUTPATIENT)
Dept: REHABILITATION | Facility: HOSPITAL | Age: 69
End: 2019-12-03
Attending: NEUROLOGICAL SURGERY
Payer: COMMERCIAL

## 2019-12-03 DIAGNOSIS — R29.898 WEAKNESS OF BOTH LOWER EXTREMITIES: ICD-10-CM

## 2019-12-03 PROCEDURE — 97113 AQUATIC THERAPY/EXERCISES: CPT

## 2019-12-03 NOTE — PROGRESS NOTES
Physical Therapy Daily Treatment Note     Name: Raffy Rutherford Jr.  Clinic Number: [id number]    Therapy Diagnosis:   Encounter Diagnosis   Name Primary?    Weakness of both lower extremities      Physician: Jason Caldwell MD    Visit Date: 12/3/2019     Physician Orders: PT Eval and Treat pool therapy  Medical Diagnosis from Referral:Z98.890 (ICD-10-CM) - S/P lumbar microdiscectomy  Evaluation Date: 11/20/2019  Authorization Period Expiration: 12/31/2019  Plan of Care Expiration: 2/12/2020  Visit # / Visits authorized: 4/ 15  Total Visits: 4    Time In: 1000  Time Out: 1100  Total Billable Time:30 minutes    Precautions: fibromyalgia    PROCEDURES/IMAGING: 10/25/2019: R MIS L5-s1 microdisectomy  Subjective     Pt reports: I was hurting more when I woke up  He was compliant with home exercise program.  Response to previous treatment: muscle soreness x 2 days  Functional change: using staff for mobility    Pain: 7/10  Location: right back      Objective     Raffy received aquatic therapeutic exercises to develop strength, endurance, ROM, flexibility, posture and core stabilization for 60 minutes including:    WARMUP x 2 laps each  Walk fwd/back/side    STRETCHES 2 x 20sec  Hamstring  Hip Flexor  Gastroc    LE EX x 20  Mini squat  Heel raise with GS  Hip abd/add  Hip flex/ext  LAQ with hip flex  HS curls  Step up forward x 10 each LE  Step up lateral x 10 each LE  Squat walk lateral  X 2 laps  Monster walks x 2 laps      UE/CORE EX x 20  Rows red tubing - 2 sec hold  Shoulder extension red tubing  Truncal rotation double strand red tubing  Mini squat hold with chest press blue disc  Shoulder extension blue disc    ENDURANCE  Bicycle // bars x  5 min    COOL DOWN x 1 lap each  Walk fwd/back/side      Home Exercises Provided and Patient Education Provided     Education provided:   Role of aquatic therapy  Hydration post therapy      Written Home Exercises Provided: yes.  Exercises were reviewed and Raffy was able to  demonstrate them prior to the end of the session.  Raffy demonstrated good  understanding of the education provided.     See EMR under Patient Instructions for exercises provided 11/27/2019.; 12/3/2019(dead bug)    Assessment   Patient arrived @ therapy using cane  Patient required intermittent verbal cues for TA activation with core exercises.Patient was able to tolerate addition of lateral squat & monster walks without any c/o increased pain. Patient did well post instruction. Reviewed & issued HEP handout. Patient will benefit from aquatic environment to unload  spine for strengthening, core stabilization & postural awareness.      Raffy is progressing well towards his goals.   Pt prognosis is Fair.     Pt will continue to benefit from skilled aquatic outpatient physical therapy to address the deficits listed in the problem list box on initial evaluation, provide pt/family education and to maximize pt's level of independence in the home and community environment.     Pt's spiritual, cultural and educational needs considered and pt agreeable to plan of care and goals.    Anticipated barriers to physical therapy: chronic pain    Goals:   Short Term Goals: 6 weeks   1. Patient will be independent in HEP & progressions (progressing)  2. Patient will achieve TUG score of 14 sec or less to demonstrate improved mobility (progressing)     Long Term Goals: 12 weeks   1. Patient will have FOTO limitation score of 55% to demonstrate improved overall function & decreased pain (progressing)  2. Patient will achieve FTSTS score of 20 sec & be able to perform all 5 sit/stand (progressing)  3. Patient will increase R hip/knee MMT to 4/5 to demonstrate improved strength (progressing)  4. Patient will increase R ankle df to 3+/5 to demonstrate improved strength & decreased gait deviations (progressing)        Plan   Increase  exercises slowly to prevent pain    Plan of care Certification: 11/20/2019 to 2/12/2020.     Outpatient  aquatic Physical Therapy 1-2 times weekly for  12 weeks to include the following interventions: manual therapy, aquatic therapy, patient education, therapeutic exercise, therapeutic activities  Patient may be seen by PTA as part of rehabilitation team    Antonia Ca, PT

## 2019-12-06 ENCOUNTER — CLINICAL SUPPORT (OUTPATIENT)
Dept: REHABILITATION | Facility: HOSPITAL | Age: 69
End: 2019-12-06
Attending: NEUROLOGICAL SURGERY
Payer: COMMERCIAL

## 2019-12-06 ENCOUNTER — TELEPHONE (OUTPATIENT)
Dept: PRIMARY CARE CLINIC | Facility: CLINIC | Age: 69
End: 2019-12-06

## 2019-12-06 DIAGNOSIS — R29.898 WEAKNESS OF BOTH LOWER EXTREMITIES: ICD-10-CM

## 2019-12-06 PROCEDURE — 97113 AQUATIC THERAPY/EXERCISES: CPT

## 2019-12-06 NOTE — PROGRESS NOTES
Physical Therapy Daily Treatment Note     Name: Raffy Rutherford Jr.  Clinic Number: [id number]    Therapy Diagnosis:   Encounter Diagnosis   Name Primary?    Weakness of both lower extremities      Physician: Jason Caldwell MD    Visit Date: 12/6/2019     Physician Orders: PT Eval and Treat pool therapy  Medical Diagnosis from Referral:Z98.890 (ICD-10-CM) - S/P lumbar microdiscectomy  Evaluation Date: 11/20/2019  Authorization Period Expiration: 12/31/2019  Plan of Care Expiration: 2/12/2020  Visit # / Visits authorized: 5/ 15  Total Visits: 5    Time In: 0900  Time Out: 1000  Total Billable Time:30 minutes    Precautions: fibromyalgia    PROCEDURES/IMAGING: 10/25/2019: R MIS L5-s1 microdisectomy  Subjective     Pt reports: That hip flexor stretch made my hernia hurt  He was compliant with home exercise program.  Response to previous treatment: muscle soreness x 2 days  Functional change: using staff for mobility    Pain: 7.5/10  Location: right back      Objective     Raffy received aquatic therapeutic exercises to develop strength, endurance, ROM, flexibility, posture and core stabilization for 60 minutes including:    WARMUP x 2 laps each  Walk fwd/back/side    STRETCHES 2 x 20sec  Hamstring  Hip Flexor-np  Gastroc    LE EX x 20  Mini squat  Heel raise with GS  Hip abd/add  Hip flex/ext  LAQ with hip flex  HS curls  Step up forward x 10 each LE-np  Step up lateral x 10 each LE-np  Squat walk lateral  Yellow Tband X 2 laps  Monster walks  Yellow Tband x 2 laps      UE/CORE EX x 20  Rows red tubing - 2 sec hold  Shoulder extension red tubing  Truncal rotation double strand red tubing  Mini squat hold with chest press blue disc- posterior support pool wall  Shoulder extension blue disc    ENDURANCE  Bicycle // bars x  5 min    COOL DOWN x 1 lap each  Walk fwd/back/side      Home Exercises Provided and Patient Education Provided     Education provided:   Role of aquatic therapy  Hydration post therapy      Written  Home Exercises Provided: yes.  Exercises were reviewed and Raffy was able to demonstrate them prior to the end of the session.  Raffy demonstrated good  understanding of the education provided.     See EMR under Patient Instructions for exercises provided 11/27/2019.; 12/3/2019(dead bug)    Assessment   FOTO limitation score 60%(was 70%) indicative of some improvement in function. Hip flexor stretch not performed 2/2 pain after last visit.  Patient required intermittent verbal cues for TA activation with core exercises.Patient was able to tolerate addition of lateral squat & monster walks with resistance band without any c/o increased pain. Patient did well post instruction. Reviewed & issued HEP handout. Patient will benefit from aquatic environment to unload  spine for strengthening, core stabilization & postural awareness.      Raffy is progressing well towards his goals.   Pt prognosis is Fair.     Pt will continue to benefit from skilled aquatic outpatient physical therapy to address the deficits listed in the problem list box on initial evaluation, provide pt/family education and to maximize pt's level of independence in the home and community environment.     Pt's spiritual, cultural and educational needs considered and pt agreeable to plan of care and goals.    Anticipated barriers to physical therapy: chronic pain    Goals:   Short Term Goals: 6 weeks   1. Patient will be independent in HEP & progressions (progressing)  2. Patient will achieve TUG score of 14 sec or less to demonstrate improved mobility (progressing)     Long Term Goals: 12 weeks   1. Patient will have FOTO limitation score of 55% to demonstrate improved overall function & decreased pain (progressing)  2. Patient will achieve FTSTS score of 20 sec & be able to perform all 5 sit/stand (progressing)  3. Patient will increase R hip/knee MMT to 4/5 to demonstrate improved strength (progressing)  4. Patient will increase R ankle df to 3+/5 to  demonstrate improved strength & decreased gait deviations (progressing)        Plan   Increase  exercises slowly to prevent pain    Plan of care Certification: 11/20/2019 to 2/12/2020.     Outpatient aquatic Physical Therapy 1-2 times weekly for  12 weeks to include the following interventions: manual therapy, aquatic therapy, patient education, therapeutic exercise, therapeutic activities  Patient may be seen by PTA as part of rehabilitation team    Antonia Ca, PT

## 2019-12-11 ENCOUNTER — CLINICAL SUPPORT (OUTPATIENT)
Dept: REHABILITATION | Facility: HOSPITAL | Age: 69
End: 2019-12-11
Attending: NEUROLOGICAL SURGERY
Payer: COMMERCIAL

## 2019-12-11 ENCOUNTER — OFFICE VISIT (OUTPATIENT)
Dept: NEUROSURGERY | Facility: CLINIC | Age: 69
End: 2019-12-11
Payer: COMMERCIAL

## 2019-12-11 ENCOUNTER — HOSPITAL ENCOUNTER (OUTPATIENT)
Dept: RADIOLOGY | Facility: HOSPITAL | Age: 69
Discharge: HOME | End: 2019-12-11
Attending: NEUROLOGICAL SURGERY
Payer: COMMERCIAL

## 2019-12-11 VITALS
SYSTOLIC BLOOD PRESSURE: 111 MMHG | DIASTOLIC BLOOD PRESSURE: 69 MMHG | BODY MASS INDEX: 26.98 KG/M2 | HEIGHT: 68 IN | HEART RATE: 80 BPM | WEIGHT: 178 LBS

## 2019-12-11 DIAGNOSIS — R29.898 WEAKNESS OF BOTH LOWER EXTREMITIES: ICD-10-CM

## 2019-12-11 DIAGNOSIS — M25.551 RIGHT HIP PAIN: Primary | ICD-10-CM

## 2019-12-11 DIAGNOSIS — M25.551 RIGHT HIP PAIN: ICD-10-CM

## 2019-12-11 PROCEDURE — 73502 X-RAY EXAM HIP UNI 2-3 VIEWS: CPT | Mod: TC,PN,RT

## 2019-12-11 PROCEDURE — 99024 PR POST-OP FOLLOW-UP VISIT: ICD-10-PCS | Mod: S$GLB,,, | Performed by: NEUROLOGICAL SURGERY

## 2019-12-11 PROCEDURE — 99999 PR PBB SHADOW E&M-EST. PATIENT-LVL IV: ICD-10-PCS | Mod: PBBFAC,,, | Performed by: NEUROLOGICAL SURGERY

## 2019-12-11 PROCEDURE — 73502 XR HIP 2 VIEW RIGHT: ICD-10-PCS | Mod: 26,RT,, | Performed by: RADIOLOGY

## 2019-12-11 PROCEDURE — 73502 X-RAY EXAM HIP UNI 2-3 VIEWS: CPT | Mod: 26,RT,, | Performed by: RADIOLOGY

## 2019-12-11 PROCEDURE — 99999 PR PBB SHADOW E&M-EST. PATIENT-LVL IV: CPT | Mod: PBBFAC,,, | Performed by: NEUROLOGICAL SURGERY

## 2019-12-11 PROCEDURE — 97113 AQUATIC THERAPY/EXERCISES: CPT

## 2019-12-11 PROCEDURE — 99024 POSTOP FOLLOW-UP VISIT: CPT | Mod: S$GLB,,, | Performed by: NEUROLOGICAL SURGERY

## 2019-12-11 RX ORDER — CYANOCOBALAMIN 1000 UG/ML
INJECTION, SOLUTION INTRAMUSCULAR; SUBCUTANEOUS
COMMUNITY
Start: 2019-10-05 | End: 2021-12-14

## 2019-12-11 RX ORDER — PREGABALIN 100 MG/1
100 CAPSULE ORAL 3 TIMES DAILY
Qty: 90 CAPSULE | Refills: 5 | Status: SHIPPED | OUTPATIENT
Start: 2019-12-11 | End: 2020-07-09

## 2019-12-11 NOTE — PROGRESS NOTES
Physical Therapy Daily Treatment Note     Name: Raffy Rutherford Jr.  Clinic Number: [id number]    Therapy Diagnosis:   Encounter Diagnosis   Name Primary?    Weakness of both lower extremities      Physician: Jason Caldwell MD    Visit Date: 12/11/2019     Physician Orders: PT Eval and Treat pool therapy  Medical Diagnosis from Referral:Z98.890 (ICD-10-CM) - S/P lumbar microdiscectomy  Evaluation Date: 11/20/2019  Authorization Period Expiration: 12/31/2019  Plan of Care Expiration: 2/12/2020  Visit # / Visits authorized: 6/ 15  Total Visits: 6    Time In: 1000  Time Out: 1100  Total Billable Time:30 minutes    Precautions: fibromyalgia    PROCEDURES/IMAGING: 10/25/2019: R MIS L5-s1 microdisectomy  Subjective     Pt reports: I fell Monday & was backing up in the garage & tripped over a box but no increase pain  He was compliant with home exercise program.  Response to previous treatment: muscle soreness x 2 days  Functional change: using staff for mobility    Pain: 5/10  Location: right back      Objective     Raffy received aquatic therapeutic exercises to develop strength, endurance, ROM, flexibility, posture and core stabilization for 60 minutes including:    WARMUP x 2 laps each  Walk fwd/back/side    STRETCHES 2 x 20sec  Hamstring  Gastroc    LE EX x 20  Mini squat  Heel raise with GS  Hip abd/add  Hip flex/ext  LAQ with hip flex  HS curls  Step up forward x 10 each LE  Step up lateral x 10 each LE  Squat walk lateral  Yellow Tband X 2 laps  Monster walks  Yellow Tband x 2 laps      UE/CORE EX x 20  Rows orange tubing - 2 sec hold  Shoulder extension orange tubing  Truncal rotation double strand orange tubing  Mini squat hold with chest press blue disc- posterior support pool wall  Shoulder extension blue disc    ENDURANCE  Bicycle // bars x  5 min    COOL DOWN x 1 lap each  Walk fwd/back/side      Home Exercises Provided and Patient Education Provided     Education provided:   Role of aquatic  therapy  Hydration post therapy      Written Home Exercises Provided: yes.  Exercises were reviewed and Raffy was able to demonstrate them prior to the end of the session.  Raffy demonstrated good  understanding of the education provided.     See EMR under Patient Instructions for exercises provided 11/27/2019.; 12/3/2019(dead bug)    Assessment     Patient required intermittent verbal cues for TA activation & normal respiration with core exercises..Patient was able to tolerate all exercises without any c/o increased pain. Patient did well post instruction. Patient will benefit from aquatic environment to unload  spine for strengthening, core stabilization & postural awareness.      Raffy is progressing well towards his goals.   Pt prognosis is Fair.     Pt will continue to benefit from skilled aquatic outpatient physical therapy to address the deficits listed in the problem list box on initial evaluation, provide pt/family education and to maximize pt's level of independence in the home and community environment.     Pt's spiritual, cultural and educational needs considered and pt agreeable to plan of care and goals.    Anticipated barriers to physical therapy: chronic pain    Goals:   Short Term Goals: 6 weeks   1. Patient will be independent in HEP & progressions (progressing)  2. Patient will achieve TUG score of 14 sec or less to demonstrate improved mobility (progressing)     Long Term Goals: 12 weeks   1. Patient will have FOTO limitation score of 55% to demonstrate improved overall function & decreased pain (progressing)  2. Patient will achieve FTSTS score of 20 sec & be able to perform all 5 sit/stand (progressing)  3. Patient will increase R hip/knee MMT to 4/5 to demonstrate improved strength (progressing)  4. Patient will increase R ankle df to 3+/5 to demonstrate improved strength & decreased gait deviations (progressing)        Plan   Increase  exercises slowly to prevent pain    Plan of care  Certification: 11/20/2019 to 2/12/2020.     Outpatient aquatic Physical Therapy 1-2 times weekly for  12 weeks to include the following interventions: manual therapy, aquatic therapy, patient education, therapeutic exercise, therapeutic activities  Patient may be seen by PTA as part of rehabilitation team    Antonia Ca, PT

## 2019-12-12 ENCOUNTER — TELEPHONE (OUTPATIENT)
Dept: PRIMARY CARE CLINIC | Facility: CLINIC | Age: 69
End: 2019-12-12

## 2019-12-12 ENCOUNTER — TELEPHONE (OUTPATIENT)
Dept: NEUROSURGERY | Facility: CLINIC | Age: 69
End: 2019-12-12

## 2019-12-12 NOTE — PROGRESS NOTES
NEUROSURGICAL POST-OPERATIVE PROGRESS NOTE    DATE OF SERVICE:  12/11/2019      ATTENDING PHYSICIAN:  Jason Caldwell MD    SUBJECTIVE:    INTERIM HISTORY:    This is a very pleasant 69 y.o. y.o. male, who is status 6 weeks right L5-S1 microdiscectomy. Reports significant improvement in his leg pain since the surgery. Can now walk with a cane. Low back pain has also improved. Only has occasional right hip pain at this time. Difficulty walking because of right foot drop. Bilateral plantar flexion weakness worse on the right side is improving. Has become more functional. Takes Lyrica 100 mg BID.      Low Back Pain Scale  R Low Back-Pain Score: 7  R Low Back-Pain Intensity: Pain killers give moderate relief from pain  R Low Back-Pain Score: I can look after myself normally without causing extra pain  Low Back-Lifting: I can only lift very light weights   Low Back-Walking: Pain prevents me walking more than .5 mile   Low Back-Sitting: Pain prevents me from sitting more than 1 hour   Low Back-Standing: I cannot stand for longer than 10 minutes with increasing pain   Low Back-Sleeping: I have no pain in bed   Low Back-Social Life: Pain has no significant effect on my social life apart from limiting my more en   Low Back-Traveling: I have extra pain while traveling which compels me to seek alternate forms of travel   Low Back-Changing Degree of Pain: My pain fluctuates but is definitely getting better         OBJECTIVE:    PHYSICAL EXAMINATION:   Vitals:    12/11/19 1505   BP: 111/69   Pulse: 80       Neurosurgery Physical Exam    Back Exam     Muscle Strength   Right Quadriceps:  5/5   Left Quadriceps:  5/5             Neurologic Exam     Motor Exam     Strength   Right iliopsoas: 5/5  Left iliopsoas: 5/5  Right quadriceps: 5/5  Left quadriceps: 5/5  Right anterior tibial: 2/5  Left anterior tibial: 4/5  Right gastroc: 3/5  Left gastroc: 4/5      Wound has healed.    DIAGNOSTIC DATA:    Right hip XR shows mild  degenerative changes. No avascular necrosis.     ASSESMENT:    This is a 69 y.o. male who is s/p right L5-S1 microdiscectomy with significant improvement in right leg pain and functional status.    Problem List Items Addressed This Visit     None      Visit Diagnoses     Right hip pain    -  Primary    Relevant Orders    X-Ray Hip 2 or 3 views Right (Completed)          PLAN:    Increase Lyrica to 100 mg TID  Continue water therapy  All questions answered  Will FU as needed.         Jason Caldwell MD  Pager: [phone]

## 2019-12-12 NOTE — TELEPHONE ENCOUNTER
----- Message from Jason Caldwell MD sent at 12/11/2019  9:13 PM CST -----  Please let the patient know that his right hip XR is showing mild degenerative changes. There is no avascular necrosis. I would recommend to continue PT. He can let us know when he feels ready to start standard PT.

## 2019-12-12 NOTE — PROGRESS NOTES
"Physical Therapy Daily Treatment Note     Name: Raffy Rutherford Jr.  Clinic Number: [id number]    Therapy Diagnosis:   Encounter Diagnosis   Name Primary?    Weakness of both lower extremities      Physician: Jason Caldwell MD    Visit Date: 12/13/2019     Physician Orders: PT Eval and Treat pool therapy  Medical Diagnosis from Referral:Z98.890 (ICD-10-CM) - S/P lumbar microdiscectomy  Evaluation Date: 11/20/2019  Authorization Period Expiration: 12/31/2019  Plan of Care Expiration: 2/12/2020  Visit # / Visits authorized: 7/ 15  Total Visits: 7    Time In: 1000  Time Out: 1100  Total Billable Time:30 minutes    Precautions: fibromyalgia    PROCEDURES/IMAGING: 10/25/2019: R MIS L5-s1 microdisectomy  Subjective     Pt reports: "my legs were tired after last time"  He was compliant with home exercise program.  Response to previous treatment: muscle soreness x 1 days  Functional change: using staff for mobility    Pain: 5/10  Location: right back      Objective     Raffy received aquatic therapeutic exercises to develop strength, endurance, ROM, flexibility, posture and core stabilization for 60 minutes including:    WARMUP x 2 laps each  Walk fwd/back/side    STRETCHES 2 x 20sec  Hamstring  Gastroc    LE EX x 20  Mini squat  Heel raise with GS  Hip abd/add  Hip flex/ext  LAQ with hip flex  HS curls  Step up forward x 10 each LE  Step up lateral x 10 each LE  Squat walk lateral  Yellow Tband X 2 laps  Monster walks  Yellow Tband x 2 laps  Wobble board anterior/posterior x 20  Wobble board lateral  X 20   Wobble board clockwise x 20      UE/CORE EX x 20  Rows orange tubing - 2 sec hold  Shoulder extension orange tubing  Truncal rotation double strand orange tubing  Mini squat hold with chest press blue disc- posterior support pool wall  Shoulder extension blue disc    ENDURANCE  Bicycle // bars x  6 min    COOL DOWN x 1 lap each  Walk fwd/back/side      Home Exercises Provided and Patient Education Provided "     Education provided:   Role of aquatic therapy  Hydration post therapy      Written Home Exercises Provided: yes.  Exercises were reviewed and Raffy was able to demonstrate them prior to the end of the session.  Raffy demonstrated good  understanding of the education provided.     See EMR under Patient Instructions for exercises provided 11/27/2019.; 12/3/2019(dead bug)    Assessment     Patient required intermittent verbal cues for TA activation & normal respiration with core exercises..Patient was able to tolerate all exercises & addition of wobbleboard for ankle musle activation without any c/o increased pain. Patient did well post instruction. Patient will benefit from aquatic environment to unload  spine for strengthening, core stabilization & postural awareness.      Raffy is progressing well towards his goals.   Pt prognosis is Fair.     Pt will continue to benefit from skilled aquatic outpatient physical therapy to address the deficits listed in the problem list box on initial evaluation, provide pt/family education and to maximize pt's level of independence in the home and community environment.     Pt's spiritual, cultural and educational needs considered and pt agreeable to plan of care and goals.    Anticipated barriers to physical therapy: chronic pain    Goals:   Short Term Goals: 6 weeks   1. Patient will be independent in HEP & progressions (progressing)  2. Patient will achieve TUG score of 14 sec or less to demonstrate improved mobility (progressing)     Long Term Goals: 12 weeks   1. Patient will have FOTO limitation score of 55% to demonstrate improved overall function & decreased pain (progressing)  2. Patient will achieve FTSTS score of 20 sec & be able to perform all 5 sit/stand (progressing)  3. Patient will increase R hip/knee MMT to 4/5 to demonstrate improved strength (progressing)  4. Patient will increase R ankle df to 3+/5 to demonstrate improved strength & decreased gait  deviations (progressing)        Plan   Increase  exercises slowly to prevent pain    Plan of care Certification: 11/20/2019 to 2/12/2020.     Outpatient aquatic Physical Therapy 1-2 times weekly for  12 weeks to include the following interventions: manual therapy, aquatic therapy, patient education, therapeutic exercise, therapeutic activities  Patient may be seen by PTA as part of rehabilitation team    Antonia Ca, PT

## 2019-12-13 ENCOUNTER — CLINICAL SUPPORT (OUTPATIENT)
Dept: REHABILITATION | Facility: HOSPITAL | Age: 69
End: 2019-12-13
Attending: NEUROLOGICAL SURGERY
Payer: COMMERCIAL

## 2019-12-13 DIAGNOSIS — R29.898 WEAKNESS OF BOTH LOWER EXTREMITIES: ICD-10-CM

## 2019-12-13 PROCEDURE — 97113 AQUATIC THERAPY/EXERCISES: CPT

## 2019-12-16 ENCOUNTER — CLINICAL SUPPORT (OUTPATIENT)
Dept: REHABILITATION | Facility: HOSPITAL | Age: 69
End: 2019-12-16
Attending: NEUROLOGICAL SURGERY
Payer: COMMERCIAL

## 2019-12-16 ENCOUNTER — TELEPHONE (OUTPATIENT)
Dept: PRIMARY CARE CLINIC | Facility: CLINIC | Age: 69
End: 2019-12-16

## 2019-12-16 DIAGNOSIS — R29.898 WEAKNESS OF BOTH LOWER EXTREMITIES: ICD-10-CM

## 2019-12-16 PROCEDURE — 97113 AQUATIC THERAPY/EXERCISES: CPT

## 2019-12-16 NOTE — PROGRESS NOTES
Physical Therapy Daily Treatment Note     Name: Raffy Rutherford Jr.  Clinic Number: [id number]    Therapy Diagnosis:   Encounter Diagnosis   Name Primary?    Weakness of both lower extremities      Physician: Jason Caldwell MD    Visit Date: 12/16/2019     Physician Orders: PT Eval and Treat pool therapy  Medical Diagnosis from Referral:Z98.890 (ICD-10-CM) - S/P lumbar microdiscectomy  Evaluation Date: 11/20/2019  Authorization Period Expiration: 12/31/2019  Plan of Care Expiration: 2/12/2020  Visit # / Visits authorized: 8/ 15  Total Visits: 8    Time In: 1310  Time Out: 1400  Total Billable Time:30 minutes    Precautions: fibromyalgia    PROCEDURES/IMAGING: 10/25/2019: R MIS L5-s1 microdisectomy  Subjective     Pt reports: I'm just tired today  He was compliant with home exercise program.  Response to previous treatment: muscle soreness x 1 days  Functional change: using staff for mobility    Pain: 4/10  Location: right back      Objective     Raffy received aquatic therapeutic exercises to develop strength, endurance, ROM, flexibility, posture and core stabilization for 50 minutes including:    WARMUP x 2 laps each  Walk fwd/back/side    STRETCHES 2 x 20sec  Hamstring  Gastroc    LE EX x 20  Mini squat  Heel raise with GS  Hip abd/add  Hip flex/ext  LAQ with hip flex  HS curls  Step up forward x 10 each LE  Step up lateral x 10 each LE  Squat walk lateral  Yellow Tband X 2 laps  Monster walks  Yellow Tband x 2 laps  Wobble board anterior/posterior x 20  Wobble board lateral  X 20   Wobble board clockwise x 20  Wobble board counterclockwise x 20       UE/CORE EX x 20  Rows orange tubing - 2 sec hold  Shoulder extension orange tubing  Truncal rotation double strand orange tubing  Mini squat hold with chest press blue disc- posterior support pool wall  Shoulder extension blue disc    ENDURANCE  Bicycle // bars x  6 min    COOL DOWN x 1 lap each  Walk fwd/back/side      Home Exercises Provided and Patient Education  Provided     Education provided:   Role of aquatic therapy  Hydration post therapy      Written Home Exercises Provided: yes.  Exercises were reviewed and Raffy was able to demonstrate them prior to the end of the session.  Raffy demonstrated good  understanding of the education provided.     See EMR under Patient Instructions for exercises provided 11/27/2019.; 12/3/2019(dead bug)    Assessment     Patient required intermittent verbal cues to slow sudeep of exercises to maximize postural control...Patient was able to tolerate all exercise  without any c/o increased pain. Patient did well post instruction. Patient will benefit from aquatic environment to unload  spine for strengthening, core stabilization & postural awareness.      Raffy is progressing well towards his goals.   Pt prognosis is Fair.     Pt will continue to benefit from skilled aquatic outpatient physical therapy to address the deficits listed in the problem list box on initial evaluation, provide pt/family education and to maximize pt's level of independence in the home and community environment.     Pt's spiritual, cultural and educational needs considered and pt agreeable to plan of care and goals.    Anticipated barriers to physical therapy: chronic pain    Goals:   Short Term Goals: 6 weeks   1. Patient will be independent in HEP & progressions (progressing)  2. Patient will achieve TUG score of 14 sec or less to demonstrate improved mobility (progressing)     Long Term Goals: 12 weeks   1. Patient will have FOTO limitation score of 55% to demonstrate improved overall function & decreased pain (progressing)  2. Patient will achieve FTSTS score of 20 sec & be able to perform all 5 sit/stand (progressing)  3. Patient will increase R hip/knee MMT to 4/5 to demonstrate improved strength (progressing)  4. Patient will increase R ankle df to 3+/5 to demonstrate improved strength & decreased gait deviations (progressing)        Plan   Increase   exercises slowly to prevent pain    Plan of care Certification: 11/20/2019 to 2/12/2020.     Outpatient aquatic Physical Therapy 1-2 times weekly for  12 weeks to include the following interventions: manual therapy, aquatic therapy, patient education, therapeutic exercise, therapeutic activities  Patient may be seen by PTA as part of rehabilitation team    Antonia Ca, PT

## 2019-12-17 ENCOUNTER — HOSPITAL ENCOUNTER (OUTPATIENT)
Facility: HOSPITAL | Age: 69
Discharge: HOME | End: 2019-12-17
Attending: INTERNAL MEDICINE | Admitting: INTERNAL MEDICINE
Payer: COMMERCIAL

## 2019-12-17 ENCOUNTER — ANESTHESIA (OUTPATIENT)
Dept: ENDOSCOPY | Facility: HOSPITAL | Age: 69
End: 2019-12-17
Payer: COMMERCIAL

## 2019-12-17 ENCOUNTER — ANESTHESIA EVENT (OUTPATIENT)
Dept: ENDOSCOPY | Facility: HOSPITAL | Age: 69
End: 2019-12-17
Payer: COMMERCIAL

## 2019-12-17 ENCOUNTER — TELEPHONE (OUTPATIENT)
Dept: PHARMACY | Facility: CLINIC | Age: 69
End: 2019-12-17

## 2019-12-17 VITALS
BODY MASS INDEX: 25.48 KG/M2 | RESPIRATION RATE: 16 BRPM | SYSTOLIC BLOOD PRESSURE: 120 MMHG | DIASTOLIC BLOOD PRESSURE: 68 MMHG | HEIGHT: 69 IN | TEMPERATURE: 98 F | HEART RATE: 58 BPM | WEIGHT: 172 LBS | OXYGEN SATURATION: 97 %

## 2019-12-17 DIAGNOSIS — Z86.010 HISTORY OF COLON POLYPS: ICD-10-CM

## 2019-12-17 PROBLEM — Z86.0100 HISTORY OF COLON POLYPS: Status: ACTIVE | Noted: 2019-12-17

## 2019-12-17 PROCEDURE — 27201012 HC FORCEPS, HOT/COLD, DISP: Performed by: INTERNAL MEDICINE

## 2019-12-17 PROCEDURE — 43239 EGD BIOPSY SINGLE/MULTIPLE: CPT | Performed by: INTERNAL MEDICINE

## 2019-12-17 PROCEDURE — 45380 PR COLONOSCOPY,BIOPSY: ICD-10-PCS | Mod: 33,,, | Performed by: INTERNAL MEDICINE

## 2019-12-17 PROCEDURE — 45380 COLONOSCOPY AND BIOPSY: CPT | Performed by: INTERNAL MEDICINE

## 2019-12-17 PROCEDURE — 88305 TISSUE EXAM BY PATHOLOGIST: ICD-10-PCS | Mod: 26,,, | Performed by: PATHOLOGY

## 2019-12-17 PROCEDURE — 88305 TISSUE EXAM BY PATHOLOGIST: CPT | Mod: 26,,, | Performed by: PATHOLOGY

## 2019-12-17 PROCEDURE — 43239 PR EGD, FLEX, W/BIOPSY, SGL/MULTI: ICD-10-PCS | Mod: 51,,, | Performed by: INTERNAL MEDICINE

## 2019-12-17 PROCEDURE — 37000008 HC ANESTHESIA 1ST 15 MINUTES: Performed by: INTERNAL MEDICINE

## 2019-12-17 PROCEDURE — E9220 PRA ENDO ANESTHESIA: ICD-10-PCS | Mod: ,,, | Performed by: NURSE ANESTHETIST, CERTIFIED REGISTERED

## 2019-12-17 PROCEDURE — 37000009 HC ANESTHESIA EA ADD 15 MINS: Performed by: INTERNAL MEDICINE

## 2019-12-17 PROCEDURE — 88305 TISSUE EXAM BY PATHOLOGIST: CPT | Performed by: PATHOLOGY

## 2019-12-17 PROCEDURE — 63600175 PHARM REV CODE 636 W HCPCS: Performed by: NURSE ANESTHETIST, CERTIFIED REGISTERED

## 2019-12-17 PROCEDURE — 25000003 PHARM REV CODE 250: Performed by: NURSE ANESTHETIST, CERTIFIED REGISTERED

## 2019-12-17 PROCEDURE — 45380 COLONOSCOPY AND BIOPSY: CPT | Mod: 33,,, | Performed by: INTERNAL MEDICINE

## 2019-12-17 PROCEDURE — 43239 EGD BIOPSY SINGLE/MULTIPLE: CPT | Mod: 51,,, | Performed by: INTERNAL MEDICINE

## 2019-12-17 PROCEDURE — E9220 PRA ENDO ANESTHESIA: HCPCS | Mod: ,,, | Performed by: NURSE ANESTHETIST, CERTIFIED REGISTERED

## 2019-12-17 RX ORDER — GLYCOPYRROLATE 0.2 MG/ML
INJECTION INTRAMUSCULAR; INTRAVENOUS
Status: DISCONTINUED | OUTPATIENT
Start: 2019-12-17 | End: 2019-12-17

## 2019-12-17 RX ORDER — PROPOFOL 10 MG/ML
VIAL (ML) INTRAVENOUS
Status: DISCONTINUED | OUTPATIENT
Start: 2019-12-17 | End: 2019-12-17

## 2019-12-17 RX ORDER — LIDOCAINE HCL/PF 100 MG/5ML
SYRINGE (ML) INTRAVENOUS
Status: DISCONTINUED | OUTPATIENT
Start: 2019-12-17 | End: 2019-12-17

## 2019-12-17 RX ORDER — SODIUM CHLORIDE 9 MG/ML
INJECTION, SOLUTION INTRAVENOUS CONTINUOUS PRN
Status: DISCONTINUED | OUTPATIENT
Start: 2019-12-17 | End: 2019-12-17

## 2019-12-17 RX ORDER — SODIUM CHLORIDE 9 MG/ML
INJECTION, SOLUTION INTRAVENOUS CONTINUOUS
Status: DISCONTINUED | OUTPATIENT
Start: 2019-12-17 | End: 2019-12-17 | Stop reason: HOSPADM

## 2019-12-17 RX ORDER — PROPOFOL 10 MG/ML
VIAL (ML) INTRAVENOUS CONTINUOUS PRN
Status: DISCONTINUED | OUTPATIENT
Start: 2019-12-17 | End: 2019-12-17

## 2019-12-17 RX ADMIN — PROPOFOL 100 MG: 10 INJECTION, EMULSION INTRAVENOUS at 07:12

## 2019-12-17 RX ADMIN — SODIUM CHLORIDE: 0.9 INJECTION, SOLUTION INTRAVENOUS at 08:12

## 2019-12-17 RX ADMIN — SODIUM CHLORIDE: 0.9 INJECTION, SOLUTION INTRAVENOUS at 07:12

## 2019-12-17 RX ADMIN — GLYCOPYRROLATE 0.2 MG: 0.2 INJECTION, SOLUTION INTRAMUSCULAR; INTRAVENOUS at 07:12

## 2019-12-17 RX ADMIN — LIDOCAINE HYDROCHLORIDE 80 MG: 20 INJECTION, SOLUTION INTRAVENOUS at 07:12

## 2019-12-17 RX ADMIN — PROPOFOL 150 MCG/KG/MIN: 10 INJECTION, EMULSION INTRAVENOUS at 07:12

## 2019-12-17 NOTE — ANESTHESIA POSTPROCEDURE EVALUATION
Anesthesia Post Evaluation    Patient: Raffy Rutherford Jr.    Procedure(s) Performed: Procedure(s) (LRB):  ESOPHAGOGASTRODUODENOSCOPY (EGD) (N/A)  COLONOSCOPY (N/A)    Final Anesthesia Type: general    Patient location during evaluation: GI PACU  Patient participation: Yes- Able to Participate  Level of consciousness: awake and alert  Post-procedure vital signs: reviewed and stable  Pain management: adequate  Airway patency: patent    PONV status at discharge: No PONV  Anesthetic complications: no      Cardiovascular status: blood pressure returned to baseline and stable  Respiratory status: unassisted, spontaneous ventilation and room air  Hydration status: euvolemic  Follow-up not needed.          Vitals Value Taken Time   /68 12/17/2019  8:50 AM   Temp 36.7 °C (98.1 °F) 12/17/2019  8:20 AM   Pulse 58 12/17/2019  8:50 AM   Resp 16 12/17/2019  8:50 AM   SpO2 97 % 12/17/2019  8:50 AM         Event Time     Out of Recovery 08:58:59          Pain/Tequila Score: Tequila Score: 9 (12/17/2019  8:20 AM)

## 2019-12-17 NOTE — TRANSFER OF CARE
"Anesthesia Transfer of Care Note    Patient: Raffy Rutherford Jr.    Procedure(s) Performed: Procedure(s) (LRB):  ESOPHAGOGASTRODUODENOSCOPY (EGD) (N/A)  COLONOSCOPY (N/A)    Patient location: GI    Anesthesia Type: general    Transport from OR: Transported from OR on room air with adequate spontaneous ventilation    Post pain: adequate analgesia    Post assessment: no apparent anesthetic complications and tolerated procedure well    Post vital signs: stable    Level of consciousness: awake and alert    Nausea/Vomiting: no nausea/vomiting    Complications: none    Transfer of care protocol was followed      Last vitals:   Visit Vitals  /85   Pulse 62   Temp 37 °C (98.6 °F)   Resp 16   Ht 5' 9" (1.753 m)   Wt 78 kg (172 lb)   SpO2 96%   BMI 25.40 kg/m²     "

## 2019-12-17 NOTE — PROVATION PATIENT INSTRUCTIONS
Discharge Summary/Instructions after an Endoscopic Procedure  Patient Name: Raffy Rutherford  Patient MRN: [id number]  Patient YOB: 1950 Tuesday, December 17, 2019  Amadou Hardin MD  RESTRICTIONS:  During your procedure today, you received medications for sedation.  These   medications may affect your judgment, balance and coordination.  Therefore,   for 24 hours, you have the following restrictions:   - DO NOT drive a car, operate machinery, make legal/financial decisions,   sign important papers or drink alcohol.    ACTIVITY:  Today: no heavy lifting, straining or running due to procedural   sedation/anesthesia.  The following day: return to full activity including work.  DIET:  Eat and drink normally unless instructed otherwise.     TREATMENT FOR COMMON SIDE EFFECTS:  - Mild abdominal pain, nausea, belching, bloating or excessive gas:  rest,   eat lightly and use a heating pad.  - Sore Throat: treat with throat lozenges and/or gargle with warm salt   water.  - Because air was used during the procedure, expelling large amounts of air   from your rectum or belching is normal.  - If a bowel prep was taken, you may not have a bowel movement for 1-3 days.    This is normal.  SYMPTOMS TO WATCH FOR AND REPORT TO YOUR PHYSICIAN:  1. Abdominal pain or bloating, other than gas cramps.  2. Chest pain.  3. Back pain.  4. Signs of infection such as: chills or fever occurring within 24 hours   after the procedure.  5. Rectal bleeding, which would show as bright red, maroon, or black stools.   (A tablespoon of blood from the rectum is not serious, especially if   hemorrhoids are present.)  6. Vomiting.  7. Weakness or dizziness.  GO DIRECTLY TO THE NEAREST EMERGENCY ROOM IF YOU HAVE ANY OF THE FOLLOWING:      Difficulty breathing              Chills and/or fever over 101 F   Persistent vomiting and/or vomiting blood   Severe abdominal pain   Severe chest pain   Black, tarry stools   Bleeding- more than one  tablespoon   Any other symptom or condition that you feel may need urgent attention  Your doctor recommends these additional instructions:  If any biopsies were taken, your doctors clinic will contact you in 1 to 2   weeks with any results.  - Patient has a contact number available for emergencies.  The signs and   symptoms of potential delayed complications were discussed with the   patient.  Return to normal activities tomorrow.  Written discharge   instructions were provided to the patient.   - Discharge patient to home (ambulatory).   - Resume previous diet.   - Continue present medications.   - Await pathology results.   - Return to primary care physician as previously scheduled.  For questions, problems or results please call your physician - Amadou Hardin MD at Work:  [phone].  [facility], EMERGENCY ROOM PHONE NUMBER: [phone]  IF A COMPLICATION OR EMERGENCY SITUATION ARISES AND YOU ARE UNABLE TO REACH   YOUR PHYSICIAN - GO DIRECTLY TO THE EMERGENCY ROOM.  Amadou Hardin MD  12/17/2019 7:41:09 AM  This report has been verified and signed electronically.  [employer]ATION

## 2019-12-17 NOTE — ANESTHESIA PREPROCEDURE EVALUATION
12/17/2019  Raffy Rutherford Jr. is a 69 y.o., male.    Past Medical History:   Diagnosis Date    Adenomatous polyp 2003    Adenomatous polyp     Allergy     Arthritis     Back pain     after trauma beginning in 195    Cataract     Chronic pain     neck and left shoulder    Cluster headache 2013    Colon polyp     Degenerative disc disease     Depression     Diverticulitis 12/2013    Elevated PSA     Fibromyalgia 2013    GERD (gastroesophageal reflux disease)     Hepatitis 1970's    A    History of prostate biopsy 2002    Hyperlipidemia     Joint pain     Sleep apnea     Special screening for malignant neoplasms, colon 5/5/2014    Thyroid nodule 7/16/2014    Visual disturbance 2012    problems after cataract surgery     Past Surgical History:   Procedure Laterality Date    BACK SURGERY      CATARACT EXTRACTION W/  INTRAOCULAR LENS IMPLANT Bilateral     CHOLECYSTECTOMY      COSMETIC SURGERY  2/10/2015    Direct mid-forehead brow lift    COSMETIC SURGERY  2/10/2015    Bilateral upper lid blepahroplasty    EYE SURGERY      HEMORRHOID SURGERY      with complication of chronic bleeding for 6 weeks     INJECTION OF STEROID Right 12/6/2018    Procedure: INJECTION, STEROID Right SI Joint Block and Steroid Injection;  Surgeon: Jason Caldwell MD;  Location: [facility] OR;  Service: Neurosurgery;  Laterality: Right;  Procedure: Right SI Joint Block and Steroid Injection  Surgery Time: 30 MIN  LOS: 0  Anesthesia: MAC  Radiology: C-arm  Bed: [place] [id number] Poster  Position: Prone    INJECTION OF STEROID Right 9/19/2019    Procedure: INJECTION, STEROID Procedure: Right SI joint block nd steroid injection;  Surgeon: Jason Caldwell MD;  Location: [facility] OR;  Service: Neurosurgery;  Laterality: Right;  Procedure: Right SI joint block nd steroid injection  Surgery Time: 30 mins  LOS:   Anesthesia: General  MAC  Radiology:C-arm  Bed: Regular Bed  Position: Prone    JOINT REPLACEMENT      KNEE SURGERY      involving arthroscopic surgery to both knees    SINUS SURGERY      SINUS SURGERY      left molar and sinus surgery for trigeminal neuralgia    SPINAL FUSION  6/22/2015    L3-L5 XLIF    SPINE SURGERY  6/22/15    XLIF/TANA    TOTAL HIP ARTHROPLASTY  4/2012    Pt states he had total hip replacement on his left hip.     Patient Active Problem List   Diagnosis    Depression    Hyperlipidemia    Chronic pain    Colon polyp    Adenomatous polyp    History of prostate biopsy    GERD (gastroesophageal reflux disease)    Back pain    Sleep apnea    Visual disturbances    Spondylosis without myelopathy    Degeneration of lumbar or lumbosacral intervertebral disc    Spinal stenosis, lumbar region, without neurogenic claudication    Thoracic or lumbosacral neuritis or radiculitis, unspecified    Displacement of lumbar intervertebral disc without myelopathy    Acquired spondylolisthesis    Lumbago    Status post cataract extraction and insertion of intraocular lens - Both Eyes    Prostatitis, acute    Fibromyalgia    OP (osteoporosis)    Compression fracture of T12 vertebra    Diverticulitis large intestine    Osteoarthritis    Lower back pain    Lower extremity pain    Muscle weakness    Range of motion deficit    Special screening for malignant neoplasms, colon    Cervicalgia    Facet joint disease of cervical region    Occipital neuralgia    Chronic migraine without aura, with intractable migraine, so stated, with status migrainosus    Cervical radiculopathy    Paroxysmal hemicrania    Sciatic nerve pain    Chronic LBP    DJD (degenerative joint disease) of lumbar spine    Chronic neck pain    Right lumbar radiculopathy    Thyroid nodule    Carpal tunnel syndrome of right wrist    Paresthesia    Brow ptosis    Degenerative disc disease    Encounter for postoperative wound  check    Lumbar radiculopathy    Cervical spondylosis    S/P lumbar spinal fusion    Right wrist tendinitis    Pain in right wrist    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    I have reviewed the Nursing Notes.   I have reviewed the Medications.     Review of Systems  Social:  Social Alcohol Use, Non-Smoker    Hematology/Oncology:  Hematology Normal        EENT/Dental:EENT/Dental Normal   Cardiovascular:   Exercise tolerance: good hyperlipidemia    Pulmonary:   Sleep Apnea    Hepatic/GI:   Bowel Prep. GERD    Musculoskeletal:   Arthritis     Neurological:   Denies CVA. Neuromuscular Disease,  Headaches    Endocrine:   Denies Diabetes.    Psych:   Psychiatric History depression          Physical Exam  General:  Well nourished    Airway/Jaw/Neck:  Airway Findings: Mouth Opening: Normal Tongue: Normal  General Airway Assessment: Adult  Mallampati: II  TM Distance: Normal, at least 6 cm  Jaw/Neck Findings:     Neck ROM: Normal ROM      Dental:  Dental Findings: In tact   Chest/Lungs:  Chest/Lungs Findings: Clear to auscultation, Normal Respiratory Rate     Heart/Vascular:  Heart Findings: Rate: Normal  Rhythm: Regular Rhythm  Sounds: Normal     Abdomen:  Abdomen Findings:  Normal, Soft       Mental Status:  Mental Status Findings:  Cooperative, Alert and Oriented         Anesthesia Plan  Type of Anesthesia, risks & benefits  discussed:  Anesthesia Type:  general  Patient's Preference:   Intra-op Monitoring Plan: standard ASA monitors  Intra-op Monitoring Plan Comments:   Post Op Pain Control Plan:   Post Op Pain Control Plan Comments:   Induction:   IV  Beta Blocker:  Patient is not currently on a Beta-Blocker (No further documentation required).       Informed Consent: Patient understands risks and agrees with Anesthesia plan.  Questions answered. Anesthesia consent signed with patient.  ASA Score: 2     Day of Surgery Review of History & Physical:    H&P update referred to the provider.         Ready For Surgery From Anesthesia Perspective.

## 2019-12-17 NOTE — PROVATION PATIENT INSTRUCTIONS
Discharge Summary/Instructions after an Endoscopic Procedure  Patient Name: Raffy Rutherford  Patient MRN: [id number]  Patient YOB: 1950 Tuesday, December 17, 2019  Amadou Hardin MD  RESTRICTIONS:  During your procedure today, you received medications for sedation.  These   medications may affect your judgment, balance and coordination.  Therefore,   for 24 hours, you have the following restrictions:   - DO NOT drive a car, operate machinery, make legal/financial decisions,   sign important papers or drink alcohol.    ACTIVITY:  Today: no heavy lifting, straining or running due to procedural   sedation/anesthesia.  The following day: return to full activity including work.  DIET:  Eat and drink normally unless instructed otherwise.     TREATMENT FOR COMMON SIDE EFFECTS:  - Mild abdominal pain, nausea, belching, bloating or excessive gas:  rest,   eat lightly and use a heating pad.  - Sore Throat: treat with throat lozenges and/or gargle with warm salt   water.  - Because air was used during the procedure, expelling large amounts of air   from your rectum or belching is normal.  - If a bowel prep was taken, you may not have a bowel movement for 1-3 days.    This is normal.  SYMPTOMS TO WATCH FOR AND REPORT TO YOUR PHYSICIAN:  1. Abdominal pain or bloating, other than gas cramps.  2. Chest pain.  3. Back pain.  4. Signs of infection such as: chills or fever occurring within 24 hours   after the procedure.  5. Rectal bleeding, which would show as bright red, maroon, or black stools.   (A tablespoon of blood from the rectum is not serious, especially if   hemorrhoids are present.)  6. Vomiting.  7. Weakness or dizziness.  GO DIRECTLY TO THE NEAREST EMERGENCY ROOM IF YOU HAVE ANY OF THE FOLLOWING:      Difficulty breathing              Chills and/or fever over 101 F   Persistent vomiting and/or vomiting blood   Severe abdominal pain   Severe chest pain   Black, tarry stools   Bleeding- more than one  tablespoon   Any other symptom or condition that you feel may need urgent attention  Your doctor recommends these additional instructions:  If any biopsies were taken, your doctors clinic will contact you in 1 to 2   weeks with any results.  - Patient has a contact number available for emergencies.  The signs and   symptoms of potential delayed complications were discussed with the   patient.  Return to normal activities tomorrow.  Written discharge   instructions were provided to the patient.   - Discharge patient to home (ambulatory).   - Resume previous diet.   - Continue present medications.   - Await pathology results.   - Repeat colonoscopy in 5 years for surveillance.  For questions, problems or results please call your physician - Amadou Hardin MD at Work:  [phone].  [facility], EMERGENCY ROOM PHONE NUMBER: [phone]  IF A COMPLICATION OR EMERGENCY SITUATION ARISES AND YOU ARE UNABLE TO REACH   YOUR PHYSICIAN - GO DIRECTLY TO THE EMERGENCY ROOM.  Amadou Hardin MD  12/17/2019 8:13:44 AM  This report has been verified and signed electronically.  PR[employer]ATION

## 2019-12-17 NOTE — H&P
[facility]  History & Physical    Subjective:      Chief Complaint/Reason for Admission: latanya/polyps    Raffy Rutherford Jr. is a 69 y.o. male.    Past Medical History:   Diagnosis Date    Adenomatous polyp 2003    Adenomatous polyp     Allergy     Arthritis     Back pain     after trauma beginning in 195    Cataract     Chronic pain     neck and left shoulder    Cluster headache 2013    Colon polyp     Degenerative disc disease     Depression     Diverticulitis 12/2013    Elevated PSA     Fibromyalgia 2013    GERD (gastroesophageal reflux disease)     Hepatitis 1970's    A    History of prostate biopsy 2002    Hyperlipidemia     Joint pain     Sleep apnea     Special screening for malignant neoplasms, colon 5/5/2014    Thyroid nodule 7/16/2014    Visual disturbance 2012    problems after cataract surgery     Past Surgical History:   Procedure Laterality Date    BACK SURGERY      CATARACT EXTRACTION W/  INTRAOCULAR LENS IMPLANT Bilateral     CHOLECYSTECTOMY      COSMETIC SURGERY  2/10/2015    Direct mid-forehead brow lift    COSMETIC SURGERY  2/10/2015    Bilateral upper lid blepahroplasty    EYE SURGERY      HEMORRHOID SURGERY      with complication of chronic bleeding for 6 weeks     INJECTION OF STEROID Right 12/6/2018    Procedure: INJECTION, STEROID Right SI Joint Block and Steroid Injection;  Surgeon: Jason Caldwell MD;  Location: [facility] OR;  Service: Neurosurgery;  Laterality: Right;  Procedure: Right SI Joint Block and Steroid Injection  Surgery Time: 30 MIN  LOS: 0  Anesthesia: MAC  Radiology: C-arm  Bed: [place] [id number] Poster  Position: Prone    INJECTION OF STEROID Right 9/19/2019    Procedure: INJECTION, STEROID Procedure: Right SI joint block nd steroid injection;  Surgeon: Jason Caldwell MD;  Location: [facility] OR;  Service: Neurosurgery;  Laterality: Right;  Procedure: Right SI joint block nd steroid injection  Surgery Time: 30 mins  LOS:   Anesthesia: General  MAC  Radiology:C-arm  Bed: Regular Bed  Position: Prone    JOINT REPLACEMENT      KNEE SURGERY      involving arthroscopic surgery to both knees    SINUS SURGERY      SINUS SURGERY      left molar and sinus surgery for trigeminal neuralgia    SPINAL FUSION  6/22/2015    L3-L5 XLIF    SPINE SURGERY  6/22/15    XLIF/TANA    TOTAL HIP ARTHROPLASTY  4/2012    Pt states he had total hip replacement on his left hip.     Family History   Problem Relation Age of Onset    Cancer Mother         colon; uterine    Nephrolithiasis Mother     Stroke Mother 86    Pancreatitis Brother     Vision loss Brother         macular degeneration    Diabetes Brother     Macular degeneration Brother     Migraines Sister     No Known Problems Father     No Known Problems Maternal Grandmother     No Known Problems Maternal Grandfather     No Known Problems Paternal Grandmother     No Known Problems Paternal Grandfather     No Known Problems Sister     No Known Problems Maternal Aunt     No Known Problems Maternal Uncle     No Known Problems Paternal Aunt     No Known Problems Paternal Uncle     Melanoma Neg Hx     Amblyopia Neg Hx     Blindness Neg Hx     Cataracts Neg Hx     Glaucoma Neg Hx     Hypertension Neg Hx     Retinal detachment Neg Hx     Strabismus Neg Hx     Thyroid disease Neg Hx     Allergic rhinitis Neg Hx     Allergies Neg Hx     Angioedema Neg Hx     Asthma Neg Hx     Eczema Neg Hx     Urticaria Neg Hx     Rhinitis Neg Hx     Immunodeficiency Neg Hx     Atopy Neg Hx      Social History     Tobacco Use    Smoking status: Never Smoker    Smokeless tobacco: Never Used   Substance Use Topics    Alcohol use: Yes     Frequency: 4 or more times a week     Drinks per session: 1 or 2     Binge frequency: Never     Comment: occasion    Drug use: No       PTA Medications   Medication Sig    butalbital-acetaminophen-caffeine -40 mg (FIORICET, ESGIC) -40 mg per tablet Take 1  tablet by mouth 6 hours as needed for Headaches.    celecoxib (CELEBREX) 200 MG capsule Take 1 capsule (200 mg total) by mouth 2 (two) times daily.    clonazePAM (KLONOPIN) 0.5 MG tablet Take up to 2 tablets by mouth every night at bedtime    cyanocobalamin 1,000 mcg/mL injection     galcanezumab-gnlm (EMGALITY PEN) 120 mg/mL PnIj Inject 120 mg into the skin every 28 days.    HYDROcodone-acetaminophen (NORCO) 5-325 mg per tablet Take 1 tablet by mouth every 8 (eight) hours as needed for Pain (moderate to severe).    lamoTRIgine (LAMICTAL) 200 MG tablet take 1 tablet by mouth every day    pravastatin (PRAVACHOL) 40 MG tablet Take 1 tablet (40 mg total) by mouth once daily.    pregabalin (LYRICA) 100 MG capsule Take 1 capsule (100 mg total) by mouth 3 (three) times daily.    pregabalin (LYRICA) 50 MG capsule Take 1 capsule (50 mg total) by mouth 2 (two) times daily. (Patient taking differently: Take 100 mg by mouth 2 (two) times daily. )    RABEprazole (ACIPHEX) 20 mg tablet Take 1 tablet (20 mg total) by mouth 2 (two) times daily.    selegiline (EMSAM) 12 mg/24 hr Place onto the skin once daily.    senna-docusate 8.6-50 mg (PERICOLACE) 8.6-50 mg per tablet Take 2 tablets by mouth nightly as needed for Constipation.    sucralfate (CARAFATE) 1 gram tablet TAKE 1 TABLET BY MOUTH 4 TIMES A DAY    traZODone (DESYREL) 100 MG tablet take 1 tablet by mouth every night at bedtime    diclofenac sodium (VOLTAREN) 1 % Gel Apply 2 g topically once daily.    HYDROcodone-acetaminophen (NORCO) 7.5-325 mg per tablet Take 1 tablet by mouth every 4 (four) hours as needed for Pain.    indomethacin (INDOCIN) 25 MG capsule Take 1 capsule (25 mg total) by mouth 3 (three) times daily as needed.     Review of patient's allergies indicates:   Allergen Reactions    Alphagan [brimonidine]      Patient taking MASO-B Selective Inhibitor Selegiline (Emsam)    Coumadin [warfarin]      itch    Oxycodone      hiccups    Pennsaid  [diclofenac sodium] Rash        Review of Systems   Respiratory: Negative.    Cardiovascular: Negative.        Objective:      Vital Signs (Most Recent)  Temp: 98.6 °F (37 °C) (12/17/19 0724)  Pulse: 62 (12/17/19 0724)  Resp: 16 (12/17/19 0724)  BP: 122/85 (12/17/19 0724)  SpO2: 96 % (12/17/19 0724)    Vital Signs Range (Last 24H):  Temp:  [98.6 °F (37 °C)]   Pulse:  [62]   Resp:  [16]   BP: (122)/(85)   SpO2:  [96 %]     Physical Exam   Cardiovascular: Normal rate and regular rhythm.   Pulmonary/Chest: Effort normal and breath sounds normal.       Data Review:     ECG: .     Assessment:      Active Hospital Problems    Diagnosis  POA    History of colon polyps [Z86.010]  Not Applicable      Resolved Hospital Problems   No resolved problems to display.       Plan:    Indication for procedure:    ASA:II  Airway normal  Malampati class:per anes    Personal and family history negative for anesthesia problems    Plan:egd/colon  Anesthesia plan: general

## 2019-12-20 ENCOUNTER — CLINICAL SUPPORT (OUTPATIENT)
Dept: REHABILITATION | Facility: HOSPITAL | Age: 69
End: 2019-12-20
Attending: NEUROLOGICAL SURGERY
Payer: COMMERCIAL

## 2019-12-20 ENCOUNTER — TELEPHONE (OUTPATIENT)
Dept: PRIMARY CARE CLINIC | Facility: CLINIC | Age: 69
End: 2019-12-20

## 2019-12-20 ENCOUNTER — CLINICAL SUPPORT (OUTPATIENT)
Dept: PRIMARY CARE CLINIC | Facility: CLINIC | Age: 69
End: 2019-12-20
Payer: COMMERCIAL

## 2019-12-20 DIAGNOSIS — F33.1 MDD (MAJOR DEPRESSIVE DISORDER), RECURRENT EPISODE, MODERATE: Primary | ICD-10-CM

## 2019-12-20 DIAGNOSIS — R45.89 ANXIETY ABOUT HEALTH: ICD-10-CM

## 2019-12-20 DIAGNOSIS — G89.29 OTHER CHRONIC PAIN: ICD-10-CM

## 2019-12-20 DIAGNOSIS — R29.898 WEAKNESS OF BOTH LOWER EXTREMITIES: ICD-10-CM

## 2019-12-20 PROCEDURE — 90837 PSYTX W PT 60 MINUTES: CPT | Mod: BHI,S$GLB,, | Performed by: SOCIAL WORKER

## 2019-12-20 PROCEDURE — 90837 PR PSYCHOTHERAPY W/PATIENT, 60 MIN: ICD-10-PCS | Mod: BHI,S$GLB,, | Performed by: SOCIAL WORKER

## 2019-12-20 PROCEDURE — 97113 AQUATIC THERAPY/EXERCISES: CPT

## 2019-12-20 NOTE — PROGRESS NOTES
Physical Therapy Daily Treatment Note     Name: Raffy Rutherford Jr.  Clinic Number: [id number]    Therapy Diagnosis:   Encounter Diagnosis   Name Primary?    Weakness of both lower extremities      Physician: Jason Caldwell MD    Visit Date: 12/20/2019     Physician Orders: PT Eval and Treat pool therapy  Medical Diagnosis from Referral:Z98.890 (ICD-10-CM) - S/P lumbar microdiscectomy  Evaluation Date: 11/20/2019  Authorization Period Expiration: 12/31/2019  Plan of Care Expiration: 2/12/2020  Visit # / Visits authorized: 9/ 15  Total Visits: 9    Time In: 0900  Time Out: 1000  Total Billable Time:30 minutes    Precautions: fibromyalgia    PROCEDURES/IMAGING: 10/25/2019: R MIS L5-s1 microdisectomy  Subjective     Pt reports: I'm alright  He was compliant with home exercise program.  Response to previous treatment: muscle soreness x 1 days  Functional change: using staff for mobility    Pain: 4/10  Location: right back      Objective     Raffy received aquatic therapeutic exercises to develop strength, endurance, ROM, flexibility, posture and core stabilization for 50 minutes including:    WARMUP x 2 laps each  Walk fwd/back/side    STRETCHES 2 x 20sec  Hamstring  Gastroc    LE EX x 20  Mini squat  Heel raise with GS  Hip abd/add  Hip flex/ext  LAQ with hip flex  HS curls  Step up forward x 10 each LE  Step up lateral x 10 each LE  Squat walk lateral  Yellow Tband X 2 laps  Monster walks  Yellow Tband x 2 laps  Wobble board anterior/posterior x 20    Wobble board lateral  X 20   Wobble board clockwise x 20  Wobble board counterclockwise x 20       UE/CORE EX x 20  Rows orange tubing - 2 sec hold  Shoulder extension orange tubing  Truncal rotation double strand orange tubing  Mini squat hold with chest press blue disc- posterior support pool wall  Shoulder extension blue disc  Tennis racket to hit soft ball with weight shifting  x 30    ENDURANCE  Bicycle // bars x  6 min    COOL DOWN x 1 lap each  Walk  fwd/back/side      Home Exercises Provided and Patient Education Provided     Education provided:   Role of aquatic therapy  Hydration post therapy      Written Home Exercises Provided: yes.  Exercises were reviewed and Raffy was able to demonstrate them prior to the end of the session.  Raffy demonstrated good  understanding of the education provided.     See EMR under Patient Instructions for exercises provided 11/27/2019.; 12/3/2019(dead bug)    Assessment   PT adjusted cane to proper height.  Patient required intermittent verbal cues for TA activation to maximize postural control...Patient was able to tolerate all exercise  without any c/o increased pain.Patient tolerated addition of weight shifting using tennis racket & ball with no increased.  Patient did well post instruction. Patient will benefit from aquatic environment to unload  spine for strengthening, core stabilization & postural awareness.      Raffy is progressing well towards his goals.   Pt prognosis is Fair.     Pt will continue to benefit from skilled aquatic outpatient physical therapy to address the deficits listed in the problem list box on initial evaluation, provide pt/family education and to maximize pt's level of independence in the home and community environment.     Pt's spiritual, cultural and educational needs considered and pt agreeable to plan of care and goals.    Anticipated barriers to physical therapy: chronic pain    Goals:   Short Term Goals: 6 weeks   1. Patient will be independent in HEP & progressions (progressing)  2. Patient will achieve TUG score of 14 sec or less to demonstrate improved mobility (progressing)     Long Term Goals: 12 weeks   1. Patient will have FOTO limitation score of 55% to demonstrate improved overall function & decreased pain (progressing)  2. Patient will achieve FTSTS score of 20 sec & be able to perform all 5 sit/stand (progressing)  3. Patient will increase R hip/knee MMT to 4/5 to demonstrate  improved strength (progressing)  4. Patient will increase R ankle df to 3+/5 to demonstrate improved strength & decreased gait deviations (progressing)        Plan   Increase  exercises slowly to prevent pain    Plan of care Certification: 11/20/2019 to 2/12/2020.     Outpatient aquatic Physical Therapy 1-2 times weekly for  12 weeks to include the following interventions: manual therapy, aquatic therapy, patient education, therapeutic exercise, therapeutic activities  Patient may be seen by PTA as part of rehabilitation team    Antonia Ca, PT

## 2019-12-20 NOTE — PROGRESS NOTES
"MERCEDES Costello, contacted patient to complete assessments. He scored "5" on the PHQ-9 and "1" on the DARLINE-7.  "

## 2019-12-20 NOTE — PROGRESS NOTES
Individual Psychotherapy (PhD/LCSW)    The patient location is:  Patient Home   Visit type: Telephone  Each patient to whom he or she provides medical services by telemedicine is:  (1) informed of the relationship between the physician and patient and the respective role of any other health care provider with respect to management of the patient; and (2) notified that he or she may decline to receive medical services by telemedicine and may withdraw from such care at any time.      12/20/2019    Site:  [facility]         Therapeutic Intervention: Met with patient.  Outpatient - Behavior modifying psychotherapy 60 min - CPT code 71690 and Outpatient - Supportive psychotherapy 60 min - CPT Code 44970    Chief complaint/reason for encounter: depression, anxiety, cognition and somatic     Interval history and content of current session: LCSW and PT reviewed the Acceptance and Commitment Book - PT is also a psychotherapist - PT feels like some of it does not apply to him. PT is moving forward in a forward direction with his pain. LCSW and PT reviewed that Acceptance does not mean permanently - as his body can change and he and the LCSW have to hold hope that he can live in less pain.  PT has been attending pool therapy where he plays tennis underwater, walking inside and outside of the house, and still records music inside his house. LCSW and PT discussed how composing music could very well be his form of mindfulness since he is concentrating on the present moment, is not focuses on pain, and often loses track of time even when he sets an alarm to get up and move around. PT will continue to review the ACT zeeshan, work on mindfulness.     Treatment plan:  · Target symptoms: depression, anxiety , adjustment  · Why chosen therapy is appropriate versus another modality: relevant to diagnosis, patient responds to this modality, evidence based practice  · Outcome monitoring methods: checklist/rating scale  · Therapeutic  intervention type: behavior modifying psychotherapy, supportive psychotherapy    Risk parameters:  Patient reports no suicidal ideation  Patient reports no homicidal ideation  Patient reports no self-injurious behavior  Patient reports no violent behavior    Verbal deficits: None    Patient's response to intervention:  The patient's response to intervention is accepting, motivated.    Progress toward goals and other mental status changes:  The patient's progress toward goals is good.    Diagnosis:     ICD-10-CM ICD-9-CM   1. MDD (major depressive disorder), recurrent episode, moderate F33.1 296.32   2. Anxiety about health F41.8 300.09   3. Other chronic pain G89.29 338.29       Plan:  individual psychotherapy    Return to clinic: 1 month, as scheduled, as needed    Length of Service (minutes): 60

## 2019-12-23 ENCOUNTER — CLINICAL SUPPORT (OUTPATIENT)
Dept: REHABILITATION | Facility: HOSPITAL | Age: 69
End: 2019-12-23
Attending: NEUROLOGICAL SURGERY
Payer: COMMERCIAL

## 2019-12-23 DIAGNOSIS — R29.898 WEAKNESS OF BOTH LOWER EXTREMITIES: ICD-10-CM

## 2019-12-23 PROCEDURE — 97113 AQUATIC THERAPY/EXERCISES: CPT

## 2019-12-23 NOTE — PROGRESS NOTES
Physical Therapy Daily Treatment Note     Name: Raffy Rutherford Jr.  Clinic Number: [id number]    Therapy Diagnosis:   Encounter Diagnosis   Name Primary?    Weakness of both lower extremities      Physician: Jason Caldwell MD    Visit Date: 12/23/2019     Physician Orders: PT Eval and Treat pool therapy  Medical Diagnosis from Referral:Z98.890 (ICD-10-CM) - S/P lumbar microdiscectomy  Evaluation Date: 11/20/2019  Authorization Period Expiration: 12/31/2019  Plan of Care Expiration: 2/12/2020  Visit # / Visits authorized: 10/ 15  Total Visits: 10    Time In: 1000  Time Out: 1100  Total Billable Time:30 minutes    Precautions: fibromyalgia    PROCEDURES/IMAGING: 10/25/2019: R MIS L5-s1 microdisectomy  Subjective     Pt reports: I have to  my right leg a lot when I'm walking  He was compliant with home exercise program.  Response to previous treatment: muscle soreness x 1 days  Functional change: using staff for mobility    Pain: 4/10  Location: right back      Objective     Raffy received aquatic therapeutic exercises to develop strength, endurance, ROM, flexibility, posture and core stabilization for 50 minutes including:    WARMUP x 2 laps each  Walk fwd/back/side    STRETCHES 2 x 20sec  Hamstring  Gastroc    LE EX x 20  Mini squat  Heel raise with GS  Hip abd/add  Hip flex/ext  LAQ with hip flex  HS curls  Step up forward x 12 each LE  Step up lateral x 12 each LE  Squat walk lateral  Orange Tband X 2 laps  Monster walks  [place] Tband x 2 laps  Wobble board anterior/posterior x 20  Wobble board anterior posterior single LE x 15 each LE  Wobble board lateral  X 20   Wobble board clockwise x 20  Wobble board counterclockwise x 20     UE/CORE EX x 20  Rows orange tubing - 2 sec hold- modified tandem stance  Shoulder extension orange tubing- modified tandem stance  Truncal rotation double strand orange tubing  Mini squat hold with chest press blue disc- posterior support pool wall  Shoulder extension blue  disc  Tennis racket to hit soft ball with weight shifting  x 30-np    BALANCE  Tandem gait forward // bars x 2 laps  Tandem gait backward // bars x 2 laps  Braiding x 2 laps  Gait // bars emphasis on heel strike & toe off x 2 laps    ENDURANCE  Bicycle // bars x  6 min    COOL DOWN x 1 lap each  Walk fwd/back/side      Home Exercises Provided and Patient Education Provided     Education provided:   Role of aquatic therapy  Hydration post therapy      Written Home Exercises Provided: yes.  Exercises were reviewed and Raffy was able to demonstrate them prior to the end of the session.  Raffy demonstrated good  understanding of the education provided.     See EMR under Patient Instructions for exercises provided 11/27/2019.; 12/3/2019(dead bug)    Assessment     Patient required intermittent verbal cues for TA activation with tandem stance for balance challenges..Patient was able to tolerate all exercise  without any c/o increased pain.Patient tolerated addition of increased band reistance for squat & monster walks with no increased pain.  Patient did well post instruction. Patient will benefit from aquatic environment to unload  spine for strengthening, core stabilization & postural awareness.      Raffy is progressing well towards his goals.   Pt prognosis is Fair.     Pt will continue to benefit from skilled aquatic outpatient physical therapy to address the deficits listed in the problem list box on initial evaluation, provide pt/family education and to maximize pt's level of independence in the home and community environment.     Pt's spiritual, cultural and educational needs considered and pt agreeable to plan of care and goals.    Anticipated barriers to physical therapy: chronic pain    Goals:   Short Term Goals: 6 weeks   1. Patient will be independent in HEP & progressions (progressing)  2. Patient will achieve TUG score of 14 sec or less to demonstrate improved mobility (progressing)     Long Term Goals:  12 weeks   1. Patient will have FOTO limitation score of 55% to demonstrate improved overall function & decreased pain (progressing)  2. Patient will achieve FTSTS score of 20 sec & be able to perform all 5 sit/stand (progressing)  3. Patient will increase R hip/knee MMT to 4/5 to demonstrate improved strength (progressing)  4. Patient will increase R ankle df to 3+/5 to demonstrate improved strength & decreased gait deviations (progressing)        Plan   Increase  exercises slowly to prevent pain    Plan of care Certification: 11/20/2019 to 2/12/2020.     Outpatient aquatic Physical Therapy 1-2 times weekly for  12 weeks to include the following interventions: manual therapy, aquatic therapy, patient education, therapeutic exercise, therapeutic activities  Patient may be seen by PTA as part of rehabilitation team    Antonia Ca, PT

## 2019-12-24 ENCOUNTER — TELEPHONE (OUTPATIENT)
Dept: ENDOSCOPY | Facility: HOSPITAL | Age: 69
End: 2019-12-24

## 2019-12-24 ENCOUNTER — TELEPHONE (OUTPATIENT)
Dept: PRIMARY CARE CLINIC | Facility: CLINIC | Age: 69
End: 2019-12-24

## 2019-12-24 LAB
FINAL PATHOLOGIC DIAGNOSIS: NORMAL
GROSS: NORMAL

## 2019-12-26 NOTE — PROGRESS NOTES
Physical Therapy Daily Treatment Note     Name: Raffy Rutherford Jr.  Clinic Number: [id number]    Therapy Diagnosis:   Encounter Diagnosis   Name Primary?    Weakness of both lower extremities      Physician: Jason Caldwell MD    Visit Date: 12/27/2019     Physician Orders: PT Eval and Treat pool therapy  Medical Diagnosis from Referral:Z98.890 (ICD-10-CM) - S/P lumbar microdiscectomy  Evaluation Date: 11/20/2019  Authorization Period Expiration: 12/31/2019  Plan of Care Expiration: 2/12/2020  Visit # / Visits authorized: 11/ 15  Total Visits: 11    Time In: 1000  Time Out: 1100  Total Billable Time:30 minutes    Precautions: fibromyalgia    PROCEDURES/IMAGING: 10/25/2019: R MIS L5-s1 microdisectomy  Subjective     Pt reports: I was sore after last time  He was compliant with home exercise program.  Response to previous treatment: muscle soreness x 1 day  Functional change: using staff for mobility    Pain: 2/10  Location: right back      Objective     Raffy received aquatic therapeutic exercises to develop strength, endurance, ROM, flexibility, posture and core stabilization for 50 minutes including:    WARMUP x 2 laps each  Walk fwd/back/side    STRETCHES 2 x 20sec  Hamstring  Gastroc    LE EX x 20  Mini squat  Heel raise with GS  Hip abd/add  Hip flex/ext  LAQ with hip flex  HS curls  Step up forward x 12 each LE  Step up lateral x 12 each LE  Squat walk lateral  Orange Tband X 2 laps  Monster walks  [place] Tband x 2 laps  Wobble board anterior/posterior x 20  Wobble board anterior posterior single LE x 15 each LE  Wobble board lateral  X 20   Wobble board clockwise x 20  Wobble board counterclockwise x 20     UE/CORE EX x 20  Rows orange tubing - 2 sec hold- modified tandem stance  Shoulder extension orange tubing- modified tandem stance  Truncal rotation double strand orange tubing  Mini squat hold with chest press blue disc- posterior support pool wall  Shoulder extension blue disc  Tennis racket to hit soft  ball with weight shifting  x 30-np    BALANCE  Tandem gait forward // bars x 2 laps  Tandem gait backward // bars x 2 laps  Braiding x 2 laps  Heel walking // bars x 1 lap  Toe walking // bars x 2 laps  Gait // bars emphasis on heel strike & toe off x 2 laps-np    ENDURANCE  Bicycle // bars x  6 min    COOL DOWN x 1 lap each  Walk fwd/back/side      Home Exercises Provided and Patient Education Provided     Education provided:   Role of aquatic therapy  Hydration post therapy      Written Home Exercises Provided: yes.  Exercises were reviewed and Raffy was able to demonstrate them prior to the end of the session.  Raffy demonstrated good  understanding of the education provided.     See EMR under Patient Instructions for exercises provided 11/27/2019.; 12/3/2019(dead bug)    Assessment     Patient required intermittent verbal cues for TA activation with heel & toe walking 2/2 tendency for forward flexed posture..Patient was able to tolerate all exercise  without any c/o increased pain.Patient tolerated addition of heel & toe walking with slight RLE increased pain with heel walking. Patient did well post instruction. Patient will benefit from aquatic environment to unload  spine for strengthening, core stabilization & postural awareness.      Raffy is progressing well towards his goals.   Pt prognosis is Fair.     Pt will continue to benefit from skilled aquatic outpatient physical therapy to address the deficits listed in the problem list box on initial evaluation, provide pt/family education and to maximize pt's level of independence in the home and community environment.     Pt's spiritual, cultural and educational needs considered and pt agreeable to plan of care and goals.    Anticipated barriers to physical therapy: chronic pain    Goals:   Short Term Goals: 6 weeks   1. Patient will be independent in HEP & progressions (progressing)  2. Patient will achieve TUG score of 14 sec or less to demonstrate  improved mobility (progressing)     Long Term Goals: 12 weeks   1. Patient will have FOTO limitation score of 55% to demonstrate improved overall function & decreased pain (progressing)  2. Patient will achieve FTSTS score of 20 sec & be able to perform all 5 sit/stand (progressing)  3. Patient will increase R hip/knee MMT to 4/5 to demonstrate improved strength (progressing)  4. Patient will increase R ankle df to 3+/5 to demonstrate improved strength & decreased gait deviations (progressing)        Plan   Increase  exercises slowly to prevent pain    Plan of care Certification: 11/20/2019 to 2/12/2020.     Outpatient aquatic Physical Therapy 1-2 times weekly for  12 weeks to include the following interventions: manual therapy, aquatic therapy, patient education, therapeutic exercise, therapeutic activities  Patient may be seen by PTA as part of rehabilitation team    Antonia Ca, PT

## 2019-12-27 ENCOUNTER — CLINICAL SUPPORT (OUTPATIENT)
Dept: REHABILITATION | Facility: HOSPITAL | Age: 69
End: 2019-12-27
Attending: NEUROLOGICAL SURGERY
Payer: COMMERCIAL

## 2019-12-27 DIAGNOSIS — R29.898 WEAKNESS OF BOTH LOWER EXTREMITIES: ICD-10-CM

## 2019-12-27 PROCEDURE — 97113 AQUATIC THERAPY/EXERCISES: CPT

## 2019-12-30 ENCOUNTER — CLINICAL SUPPORT (OUTPATIENT)
Dept: REHABILITATION | Facility: HOSPITAL | Age: 69
End: 2019-12-30
Attending: NEUROLOGICAL SURGERY
Payer: COMMERCIAL

## 2019-12-30 DIAGNOSIS — R29.898 WEAKNESS OF BOTH LOWER EXTREMITIES: ICD-10-CM

## 2019-12-30 PROCEDURE — 97113 AQUATIC THERAPY/EXERCISES: CPT

## 2019-12-30 NOTE — PROGRESS NOTES
Physical Therapy Daily Treatment Note     Name: Raffy Rutherford Jr.  Clinic Number: [id number]    Therapy Diagnosis:   Encounter Diagnosis   Name Primary?    Weakness of both lower extremities      Physician: Jason Caldwell MD    Visit Date: 12/30/2019     Physician Orders: PT Eval and Treat pool therapy  Medical Diagnosis from Referral:Z98.890 (ICD-10-CM) - S/P lumbar microdiscectomy  Evaluation Date: 11/20/2019  Authorization Period Expiration: 12/31/2019  Plan of Care Expiration: 2/12/2020  Visit # / Visits authorized: 12/ 15  Total Visits: 12    Time In: 1500  Time Out: 1600  Total Billable Time:60 minutes    Precautions: fibromyalgia    PROCEDURES/IMAGING: 10/25/2019: R MIS L5-s1 microdisectomy  Subjective     Pt reports: My leg gets tired walking all the way in from the parking lot  He was compliant with home exercise program.  Response to previous treatment: muscle soreness x 1 day  Functional change: using staff for mobility    Pain: 3/10 R back, 6/10 RLE sciatica  Location: right back   & RLE    Objective     Raffy received aquatic therapeutic exercises to develop strength, endurance, ROM, flexibility, posture and core stabilization for 50 minutes including:    WARMUP x 2 laps each  Walk fwd/back/side    STRETCHES 2 x 20sec  Hamstring  Quad  Gastroc    LE EX x 20  Mini squat  Heel raise with GS  Hip abd/add-np  Hip flex/ext-np  LAQ with hip flex-np  HS curls-np  Step up forward x 12 each LE-np  Step up lateral x 12 each LE-np  Squat walk lateral  Orange Tband X 2 laps  Monster walks  [place] Tband x 2 laps  Wobble board anterior/posterior x 20  Wobble board anterior posterior single LE x 15 each LE  Wobble board lateral  X 20   Wobble board clockwise x 20  Wobble board counterclockwise x 20   Dive sticks retrieval using L foot dorsiflexion for 4 reps x 2 sets  Leg press noodle/tubing x 10 each LE  Hip extension noodle/tubing x 10 each LE    UE/CORE EX x 20  Rows orange tubing - 2 sec hold- modified tandem  stance  Shoulder extension orange tubing- modified tandem stance  Truncal rotation double strand orange tubing  Mini squat hold with chest press blue disc- posterior support pool wall  Shoulder extension blue disc  Tennis racket to hit soft ball with weight shifting  x 30-np    BALANCE  Tandem gait forward // bars x 2 laps  Tandem gait backward // bars x 2 laps  Braiding x 2 laps  Heel walking // bars x 1 lap  Toe walking // bars x 1 lap  Gait // bars emphasis on heel strike & toe off x 2 laps-np    ENDURANCE  Bicycle // bars x  6 min    COOL DOWN x 1 lap each  Walk fwd/back/side      Home Exercises Provided and Patient Education Provided     Education provided:   Role of aquatic therapy  Hydration post therapy      Written Home Exercises Provided: yes.  Exercises were reviewed and Raffy was able to demonstrate them prior to the end of the session.  Raffy demonstrated good  understanding of the education provided.     See EMR under Patient Instructions for exercises provided 11/27/2019.; 12/3/2019(dead bug)    Assessment     Patient required intermittent verbal cues for TA activation with heel & toe walking 2/2 tendency for forward flexed posture..Patient was able to tolerate addition of leg press & hip extension using noodle/tubing with no increase in pain. Patient did well post instruction. Patient will benefit from aquatic environment to unload  spine for strengthening, core stabilization & postural awareness.      Raffy is progressing well towards his goals.   Pt prognosis is Fair.     Pt will continue to benefit from skilled aquatic outpatient physical therapy to address the deficits listed in the problem list box on initial evaluation, provide pt/family education and to maximize pt's level of independence in the home and community environment.     Pt's spiritual, cultural and educational needs considered and pt agreeable to plan of care and goals.    Anticipated barriers to physical therapy: chronic  pain    Goals:   Short Term Goals: 6 weeks   1. Patient will be independent in HEP & progressions (progressing)  2. Patient will achieve TUG score of 14 sec or less to demonstrate improved mobility (progressing)     Long Term Goals: 12 weeks   1. Patient will have FOTO limitation score of 55% to demonstrate improved overall function & decreased pain (progressing)  2. Patient will achieve FTSTS score of 20 sec & be able to perform all 5 sit/stand (progressing)  3. Patient will increase R hip/knee MMT to 4/5 to demonstrate improved strength (progressing)  4. Patient will increase R ankle df to 3+/5 to demonstrate improved strength & decreased gait deviations (progressing)    Plan   Increase  exercises slowly to prevent pain    Plan of care Certification: 11/20/2019 to 2/12/2020.     Outpatient aquatic Physical Therapy 1-2 times weekly for  12 weeks to include the following interventions: manual therapy, aquatic therapy, patient education, therapeutic exercise, therapeutic activities  Patient may be seen by PTA as part of rehabilitation team    Antonia Ca, PT

## 2020-01-03 ENCOUNTER — CLINICAL SUPPORT (OUTPATIENT)
Dept: REHABILITATION | Facility: HOSPITAL | Age: 70
End: 2020-01-03
Attending: NEUROLOGICAL SURGERY
Payer: COMMERCIAL

## 2020-01-03 DIAGNOSIS — R29.898 WEAKNESS OF BOTH LOWER EXTREMITIES: ICD-10-CM

## 2020-01-03 PROCEDURE — 97113 AQUATIC THERAPY/EXERCISES: CPT

## 2020-01-03 NOTE — PROGRESS NOTES
Physical Therapy Daily Treatment Note     Name: Raffy Rutherford Jr.  Clinic Number: [id number]    Therapy Diagnosis:   Encounter Diagnosis   Name Primary?    Weakness of both lower extremities      Physician: Jason Caldwell MD    Visit Date: 1/3/2020     Physician Orders: PT Eval and Treat pool therapy  Medical Diagnosis from Referral:Z98.890 (ICD-10-CM) - S/P lumbar microdiscectomy  Evaluation Date: 11/20/2019  Authorization Period Expiration: 1/2/2021  Plan of Care Expiration: 2/12/2020  Visit # / Visits authorized: 1/1  Total Visits: 13    Time In: 1000  Time Out: 1100  Total Billable Time:30 minutes    Precautions: fibromyalgia    PROCEDURES/IMAGING: 10/25/2019: R MIS L5-s1 microdisectomy  Subjective     Pt reports: I sat up in a hard back chair last night while out with friends & I was hurting overnight  He was compliant with home exercise program.  Response to previous treatment: muscle soreness x 1 day  Functional change: using staff for mobility    Pain: 5/10 R back, 6/10 RLE sciatica  Location: right back  & RLE    Objective     Raffy received aquatic therapeutic exercises to develop strength, endurance, ROM, flexibility, posture and core stabilization for 50 minutes including:    WARMUP x 2 laps each  Walk fwd/back/side    STRETCHES 2 x 20sec  Hamstring  Quad  Gastroc    LE EX x 20  Mini squat  Heel raise with GS  Hip abd/add-np  Hip flex/ext-np  LAQ with hip flex-np  HS curls-np  Step up forward x 12 each LE-np  Step up lateral x 12 each LE-np  Squat walk lateral  Orange Tband X 2 laps  Monster walks  Stanly Tband x 2 laps  Wobble board anterior/posterior x 20  Wobble board anterior posterior single LE x 15 each LE  Wobble board lateral  X 20   Wobble board clockwise x 20  Wobble board counterclockwise x 20   Dive ring retrieval using L foot dorsiflexion for 4 reps x 2 sets  Leg press noodle/tubing x 20 each LE  Hip extension noodle/tubing x 20 each LE    UE/CORE EX x 20  Rows orange tubing - 2 sec hold-  modified tandem stance  Shoulder extension orange tubing- modified tandem stance  Truncal rotation double strand orange tubing  Mini squat hold with chest press blue disc- posterior support pool wall  Shoulder extension blue disc  Tennis racket to hit soft ball with weight shifting  x 30-np    BALANCE-np  Tandem gait forward // bars x 2 laps  Tandem gait backward // bars x 2 laps  Braiding x 2 laps  Heel walking // bars x 1 lap  Toe walking // bars x 1 lap  Gait // bars emphasis on heel strike & toe off x 2 laps-np    ENDURANCE  Bicycle // bars x  6 min    COOL DOWN x 1 lap each  Walk fwd/back/side      Home Exercises Provided and Patient Education Provided     Education provided:   Role of aquatic therapy  Hydration post therapy      Written Home Exercises Provided: yes.  Exercises were reviewed and Raffy was able to demonstrate them prior to the end of the session.  Raffy demonstrated good  understanding of the education provided.     See EMR under Patient Instructions for exercises provided 11/27/2019.; 12/3/2019(dead bug)    Assessment     Patient required intermittent verbal cues for TA activation with upright trunk for  leg press & hip extensiown using noodle/tubing . Patient did well post instruction. Improvement with balance when performing monster walks. Patient will benefit from aquatic environment to unload  spine for strengthening, core stabilization & postural awareness.      Raffy is progressing well towards his goals.   Pt prognosis is Fair.     Pt will continue to benefit from skilled aquatic outpatient physical therapy to address the deficits listed in the problem list box on initial evaluation, provide pt/family education and to maximize pt's level of independence in the home and community environment.     Pt's spiritual, cultural and educational needs considered and pt agreeable to plan of care and goals.    Anticipated barriers to physical therapy: chronic pain    Goals:   Short Term Goals: 6  weeks   1. Patient will be independent in HEP & progressions (progressing)  2. Patient will achieve TUG score of 14 sec or less to demonstrate improved mobility (progressing)     Long Term Goals: 12 weeks   1. Patient will have FOTO limitation score of 55% to demonstrate improved overall function & decreased pain (progressing)  2. Patient will achieve FTSTS score of 20 sec & be able to perform all 5 sit/stand (progressing)  3. Patient will increase R hip/knee MMT to 4/5 to demonstrate improved strength (progressing)  4. Patient will increase R ankle df to 3+/5 to demonstrate improved strength & decreased gait deviations (progressing)    Plan   Increase  exercises slowly to prevent pain    Plan of care Certification: 11/20/2019 to 2/12/2020.     Outpatient aquatic Physical Therapy 1-2 times weekly for  12 weeks to include the following interventions: manual therapy, aquatic therapy, patient education, therapeutic exercise, therapeutic activities  Patient may be seen by PTA as part of rehabilitation team    Antonia Ca, PT

## 2020-01-05 ENCOUNTER — PATIENT OUTREACH (OUTPATIENT)
Dept: ADMINISTRATIVE | Facility: OTHER | Age: 70
End: 2020-01-05

## 2020-01-06 ENCOUNTER — OFFICE VISIT (OUTPATIENT)
Dept: UROLOGY | Facility: CLINIC | Age: 70
End: 2020-01-06
Payer: COMMERCIAL

## 2020-01-06 VITALS
SYSTOLIC BLOOD PRESSURE: 98 MMHG | BODY MASS INDEX: 26.67 KG/M2 | WEIGHT: 176 LBS | HEIGHT: 68 IN | HEART RATE: 60 BPM | DIASTOLIC BLOOD PRESSURE: 60 MMHG

## 2020-01-06 DIAGNOSIS — N13.8 BPH WITH URINARY OBSTRUCTION: Primary | ICD-10-CM

## 2020-01-06 DIAGNOSIS — N40.1 BPH WITH URINARY OBSTRUCTION: Primary | ICD-10-CM

## 2020-01-06 DIAGNOSIS — R97.20 ELEVATED PSA: ICD-10-CM

## 2020-01-06 DIAGNOSIS — R39.15 URINARY URGENCY: ICD-10-CM

## 2020-01-06 DIAGNOSIS — N52.01 ERECTILE DYSFUNCTION DUE TO ARTERIAL INSUFFICIENCY: ICD-10-CM

## 2020-01-06 DIAGNOSIS — R33.9 INCOMPLETE EMPTYING OF BLADDER: ICD-10-CM

## 2020-01-06 LAB
BILIRUB SERPL-MCNC: NORMAL MG/DL
BLOOD URINE, POC: NORMAL
COLOR, POC UA: YELLOW
GLUCOSE UR QL STRIP: NORMAL
KETONES UR QL STRIP: NORMAL
LEUKOCYTE ESTERASE URINE, POC: NORMAL
NITRITE, POC UA: NORMAL
PH, POC UA: 7
POC RESIDUAL URINE VOLUME: 91 ML (ref 0–100)
PROTEIN, POC: NORMAL
SPECIFIC GRAVITY, POC UA: 1.01
UROBILINOGEN, POC UA: NORMAL

## 2020-01-06 PROCEDURE — 51798 US URINE CAPACITY MEASURE: CPT | Mod: S$GLB,,, | Performed by: NURSE PRACTITIONER

## 2020-01-06 PROCEDURE — 81002 URINALYSIS NONAUTO W/O SCOPE: CPT | Mod: S$GLB,,, | Performed by: NURSE PRACTITIONER

## 2020-01-06 PROCEDURE — 81002 POCT URINE DIPSTICK WITHOUT MICROSCOPE: ICD-10-PCS | Mod: S$GLB,,, | Performed by: NURSE PRACTITIONER

## 2020-01-06 PROCEDURE — 1100F PTFALLS ASSESS-DOCD GE2>/YR: CPT | Mod: CPTII,S$GLB,, | Performed by: NURSE PRACTITIONER

## 2020-01-06 PROCEDURE — 51798 POCT BLADDER SCAN: ICD-10-PCS | Mod: S$GLB,,, | Performed by: NURSE PRACTITIONER

## 2020-01-06 PROCEDURE — 1100F PR PT FALLS ASSESS DOC 2+ FALLS/FALL W/INJURY/YR: ICD-10-PCS | Mod: CPTII,S$GLB,, | Performed by: NURSE PRACTITIONER

## 2020-01-06 PROCEDURE — 99214 OFFICE O/P EST MOD 30 MIN: CPT | Mod: 25,S$GLB,, | Performed by: NURSE PRACTITIONER

## 2020-01-06 PROCEDURE — 1126F AMNT PAIN NOTED NONE PRSNT: CPT | Mod: S$GLB,,, | Performed by: NURSE PRACTITIONER

## 2020-01-06 PROCEDURE — 99214 PR OFFICE/OUTPT VISIT, EST, LEVL IV, 30-39 MIN: ICD-10-PCS | Mod: 25,S$GLB,, | Performed by: NURSE PRACTITIONER

## 2020-01-06 PROCEDURE — 3288F FALL RISK ASSESSMENT DOCD: CPT | Mod: CPTII,S$GLB,, | Performed by: NURSE PRACTITIONER

## 2020-01-06 PROCEDURE — 3288F PR FALLS RISK ASSESSMENT DOCUMENTED: ICD-10-PCS | Mod: CPTII,S$GLB,, | Performed by: NURSE PRACTITIONER

## 2020-01-06 PROCEDURE — 1159F PR MEDICATION LIST DOCUMENTED IN MEDICAL RECORD: ICD-10-PCS | Mod: S$GLB,,, | Performed by: NURSE PRACTITIONER

## 2020-01-06 PROCEDURE — 99999 PR PBB SHADOW E&M-EST. PATIENT-LVL IV: ICD-10-PCS | Mod: PBBFAC,,, | Performed by: NURSE PRACTITIONER

## 2020-01-06 PROCEDURE — 1126F PR PAIN SEVERITY QUANTIFIED, NO PAIN PRESENT: ICD-10-PCS | Mod: S$GLB,,, | Performed by: NURSE PRACTITIONER

## 2020-01-06 PROCEDURE — 99999 PR PBB SHADOW E&M-EST. PATIENT-LVL IV: CPT | Mod: PBBFAC,,, | Performed by: NURSE PRACTITIONER

## 2020-01-06 PROCEDURE — 1159F MED LIST DOCD IN RCRD: CPT | Mod: S$GLB,,, | Performed by: NURSE PRACTITIONER

## 2020-01-06 RX ORDER — TADALAFIL 5 MG/1
5 TABLET ORAL DAILY
Qty: 30 TABLET | Refills: 12 | Status: SHIPPED | OUTPATIENT
Start: 2020-01-06 | End: 2020-12-21 | Stop reason: SDUPTHER

## 2020-01-06 RX ORDER — METHOCARBAMOL 750 MG/1
TABLET, FILM COATED ORAL
COMMUNITY
Start: 2019-12-11 | End: 2020-07-09 | Stop reason: SDUPTHER

## 2020-01-06 NOTE — PROGRESS NOTES
Subjective:       Patient ID: Raffy Rutherford Jr. is a 69 y.o. male.    Chief Complaint: Benign Prostatic Hypertrophy    Raffy Rutherford Jr. is a 69 y.o. Male with history of BPH, prostatitis and elevated PSA  Unable to tolerate alpha blockers.  (-) Trus/bx 1/17/2011 with Dr. El. PSA was 5.3 at the time.  02/22/2017 MRI of the Prostate when the PSA was 7.2  -Peripheral Zone: PI RADS grade 2  -Transitional Zone: PI RADS grade 2  -Prostate size 36 ml    Last clinic visit 10/28/2019/2018    He today for annual exam;  He is reporting occasional weak FOS; some urgency but not all the time.  Nocturia 1x.     He states that lately Viagra has not been as effective as before.  He has had some medication changes.  10/25/2019 s/p lumbar laminectomy/decompression.   He currently doing swim PT                           PSA                  6.3 (H)             10/31/2019                 PSA                  5.6 (H)             10/15/2018                 PSA                  5.3 (H)             01/25/2018            PSA                  7.2 (H)             02/08/2017     (-) MRI: PiRads 2            PSA                  5.9 (H)             01/23/2017                 PSA                  6.1 (H)             01/16/2017             PSA                  4.3*                04/28/2015                 PSA                  4.6*                02/25/2015                 PSA                      4.5*                02/21/2014                 PSA                      4.42*               05/06/2013                 PSA                      5.47*               10/04/2012                 PSA                      5.3*                01/03/2011       (-) TRUS bx       PSA                      4.2*                09/27/2010                 PSA                      5.2*                08/23/2010                 PSA                      3.4                 07/21/2010                 PSA                      3.9                 05/10/2010                  PSA                      3.4                 04/22/2009                 PSA                      2.1                 10/16/2007                                Past Medical History:    Depression                                                    Hyperlipidemia                                                Chronic pain                                                    Comment:neck and left shoulder    Colon polyp                                                   Adenomatous polyp                               2003          History of prostate biopsy                      2002          GERD (gastroesophageal reflux disease)                        Back pain                                                       Comment:after trauma beginning in 89    Sleep apnea                                                   Allergy                                                       Arthritis                                                     Joint pain                                                    Hepatitis                                       1970's          Comment:A    Visual disturbance                              2012            Comment:problems after cataract surgery    Degenerative disc disease                                     Fibromyalgia                                    2013          Cluster headache                                2013          Diverticulitis                                  12/2013       Thyroid nodule                                  7/16/2014     Special screening for malignant neoplasms, col* 5/5/2014      Adenomatous polyp                                             Past Surgical History:    SINUS SURGERY                                                  KNEE SURGERY                                                     Comment:involving arthroscopic surgery to both knees    HEMORRHOID SURGERY                                               Comment:with complication of chronic  bleeding for 6                weeks     CHOLECYSTECTOMY                                                SINUS SURGERY                                                    Comment:left molar and sinus surgery for trigeminal                neuralgia    EYE SURGERY                                                    BACK SURGERY                                                   JOINT REPLACEMENT                                              TOTAL HIP ARTHROPLASTY                           4/2012          Comment:Pt states he had total hip replacement on his                left hip.    COSMETIC SURGERY                                 2/10/2015       Comment:Direct mid-forehead brow lift    COSMETIC SURGERY                                 2/10/2015       Comment:Bilateral upper lid blepahroplasty    SPINE SURGERY                                    6/22/15         Comment:XLIF/TANA    CATARACT EXTRACTION W/  INTRAOCULAR LENS IMPLA* Bilateral               SPINAL FUSION                                    6/22/2015       Comment:L3-L5 XLIF    Review of patient's family history indicates:    Cancer                         Mother                      Comment: colon; uterine    Nephrolithiasis                Mother                    Stroke                         Mother                    Pancreatitis                   Brother                   Vision loss                    Brother                     Comment: macular degeneration    Diabetes                       Brother                   Macular degeneration           Brother                   Melanoma                       Neg Hx                    Amblyopia                      Neg Hx                    Blindness                      Neg Hx                    Cataracts                      Neg Hx                    Glaucoma                       Neg Hx                    Hypertension                   Neg Hx                    Retinal detachment             Neg Hx                     Strabismus                     Neg Hx                    Thyroid disease                Neg Hx                    Migraines                      Sister                    No Known Problems              Father                    No Known Problems              Maternal Grandmother      No Known Problems              Maternal Grandfather      No Known Problems              Paternal Grandmother      No Known Problems              Paternal Grandfather      No Known Problems              Sister                    No Known Problems              Maternal Aunt             No Known Problems              Maternal Uncle            No Known Problems              Paternal Aunt             No Known Problems              Paternal Uncle              Social History    Marital Status:              Spouse Name:                       Years of Education:                 Number of children:               Occupational History  Occupation          Employer            Comment               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*                         Social History Main Topics    Smoking Status: Never Smoker                      Smokeless Status: Never Used                        Alcohol Use: Yes                Comment: occasion    Drug Use: No              Sexual Activity: Yes               Partners with: Female    Other Topics            Concern    None on file    Social History Narrative    None on file        Allergies:  Alphagan; Coumadin; and Oxycodone    Medications:  Current outpatient prescriptions: alendronate (FOSAMAX) 70 MG tablet, Take 1 tablet (70 mg total) by mouth every 7 days., Disp: 12 tablet, Rfl: 3;  clonazePAM (KLONOPIN) 1 MG tablet, TAKE 2 TABLETS AT BEDTIME. (Patient taking differently: TAKE 2 TABLETS AT BEDTIME. patient states taking 1.5), Disp: 180 tablet, Rfl: 1;  desoximetasone 0.05 % Gel, Apply to affected area 2 times a day as needed, Disp: , Rfl: 1  ergocalciferol (VITAMIN D2) 50,000 unit Cap, TAKE 1 CAPSULE  (50,000 UNITS TOTAL) BY MOUTH TWICE A WEEK., Disp: 24 capsule, Rfl: 3;  fish oil-omega-3 fatty acids 300-1,000 mg capsule, Take 2 g by mouth once daily., Disp: , Rfl: ;  (START ON 12/22/2015) hydrocodone-acetaminophen 10-325mg (NORCO)  mg Tab, Take 1 tablet by mouth 3 (three) times daily as needed., Disp: 90 tablet, Rfl: 0  hydrocortisone-pramoxine (ANALPRAM-HC) 2.5-1 % Crea, 2.5 mg., Disp: , Rfl: ;  ketoconazole (NIZORAL) 2 % cream, Apply to affected area 2 times a day for 2 weeks, Disp: , Rfl: 1;  lamotrigine (LAMICTAL) 200 MG tablet, One and one/half daily (Patient taking differently: Take 150 mg by mouth once daily. One and one/half daily), Disp: 135 tablet, Rfl: 3  magnesium oxide (MAG-OX) 400 mg tablet, Take 1 tablet (400 mg total) by mouth 2 (two) times daily., Disp: 60 tablet, Rfl: 12;  meclizine (ANTIVERT) 25 mg tablet, Take 1 tablet (25 mg total) by mouth every 6 (six) hours. Prn vertigo (Patient taking differently: Take 25 mg by mouth every 6 (six) hours as needed. Prn vertigo), Disp: 25 tablet, Rfl: 1;  methylPREDNISolone (MEDROL DOSEPACK) 4 mg tablet, use as directed, Disp: 1 Package, Rfl: 0  pilocarpine HCL 1% (PILOCAR) 1 % ophthalmic solution, Place 1 drop into both eyes once as needed. , Disp: , Rfl: ;  pravastatin (PRAVACHOL) 40 MG tablet, TAKE 1 TABLET (40 MG TOTAL) BY MOUTH EVERY EVENING., Disp: 30 tablet, Rfl: 5;  rabeprazole (ACIPHEX) 20 mg tablet, Take 2 tablets (40 mg total) by mouth once daily., Disp: 180 tablet, Rfl: 3;  selegiline (EMSAM) 12 mg/24 hr, Place 1 patch onto the skin once daily., Disp: 90 patch, Rfl: 3  senna-docusate 8.6-50 mg (PERICOLACE) 8.6-50 mg per tablet, Take 2 tablets by mouth nightly as needed for Constipation., Disp: , Rfl: ;  trazodone (DESYREL) 100 MG tablet, 1 and 1/2 talbet daily (Patient taking differently: every evening. 1 and 1/2 talbet daily), Disp: 135 tablet, Rfl: 3;  UBIDECARENONE (CO Q-10 ORAL), Take 1 capsule by mouth once daily. , Disp: , Rfl:    UNABLE TO FIND, medication name: cefaly + electrodes, wear 20-30 mins, Dx: migraine, Disp: 1 Units, Rfl: 0  Current facility-administered medications: gentamicin injection 160 mg, 160 mg, Intramuscular, Q12H, Usha Gonzales NP, 160 mg at 12/17/15 1504              Review of Systems   Constitutional: Negative for activity change, appetite change, chills and fever.   HENT: Negative for facial swelling and trouble swallowing.    Eyes: Negative for visual disturbance.   Respiratory: Negative for chest tightness and shortness of breath.    Cardiovascular: Negative for chest pain and palpitations.   Gastrointestinal: Negative.  Negative for abdominal pain, constipation, diarrhea, nausea and vomiting.   Genitourinary: Positive for difficulty urinating and urgency. Negative for dysuria, flank pain, hematuria, penile pain, penile swelling, scrotal swelling and testicular pain.        Some changes in FOS   Musculoskeletal: Positive for gait problem. Negative for back pain, myalgias and neck stiffness.        Recent back surgery     Skin: Negative for rash.   Neurological: Negative for dizziness and speech difficulty.   Hematological: Does not bruise/bleed easily.   Psychiatric/Behavioral: Negative for behavioral problems.       Objective:      Physical Exam   Nursing note and vitals reviewed.  Constitutional: He is oriented to person, place, and time. Vital signs are normal. He appears well-developed and well-nourished. He is active and cooperative.  Non-toxic appearance. He does not have a sickly appearance.   Urine dipped clear of infection;   PVR in the office today by the nurse was 91     HENT:   Head: Normocephalic and atraumatic.   Right Ear: External ear normal.   Left Ear: External ear normal.   Nose: Nose normal.   Mouth/Throat: Mucous membranes are normal.   Eyes: Conjunctivae and lids are normal. No scleral icterus.   Neck: Trachea normal, normal range of motion and full passive range of motion without pain.  Neck supple. No JVD present. No tracheal deviation present.   Cardiovascular: Normal rate, S1 normal and S2 normal.    Pulmonary/Chest: Effort normal. No respiratory distress. He exhibits no tenderness.   Abdominal: Soft. Normal appearance. There is no hepatosplenomegaly. There is no tenderness. There is no CVA tenderness. A hernia is present. Hernia confirmed positive in the left inguinal area. Hernia confirmed negative in the right inguinal area.   Genitourinary: Rectum normal, testes normal and penis normal. Right testis shows no mass, no swelling and no tenderness. Left testis shows no mass, no swelling and no tenderness. Uncircumcised. No phimosis, paraphimosis, hypospadias, penile erythema or penile tenderness. No discharge found.       Musculoskeletal: Normal range of motion.   Neurological: He is alert and oriented to person, place, and time. He has normal strength.   Skin: Skin is warm, dry and intact.     Psychiatric: He has a normal mood and affect. His behavior is normal. Judgment and thought content normal.       Assessment:       1. BPH with urinary obstruction    2. Elevated PSA    3. Erectile dysfunction due to arterial insufficiency    4. Urinary urgency    5. Incomplete emptying of bladder        Plan:         I spent 25 minutes with the patient of which more than half was spent in direct consultation with the patient in regards to our treatment and plan.    Education and recommendations of today's plan of care including home remedies.  We discussed his LUTS and contributory factors.  Diet modifications.  He has been unable to tolerate alpha blockers in the past.  Recent back surgery can have an effect on bladder and ED (up to 6 months)  Rx for daily Cialis 5mg to help he BPH/LUTS as well as ED  Benefits,risks, se discussed  RTC 3 months with new PSA and evaluation of LUTS    RTC 3 months

## 2020-01-07 ENCOUNTER — CLINICAL SUPPORT (OUTPATIENT)
Dept: REHABILITATION | Facility: HOSPITAL | Age: 70
End: 2020-01-07
Attending: NEUROLOGICAL SURGERY
Payer: COMMERCIAL

## 2020-01-07 DIAGNOSIS — R29.898 WEAKNESS OF BOTH LOWER EXTREMITIES: ICD-10-CM

## 2020-01-07 PROCEDURE — 97113 AQUATIC THERAPY/EXERCISES: CPT

## 2020-01-07 NOTE — PROGRESS NOTES
Physical Therapy Daily Treatment Note     Name: Raffy Rutherford Jr.  Clinic Number: [id number]    Therapy Diagnosis:   Encounter Diagnosis   Name Primary?    Weakness of both lower extremities      Physician: Jason Caldwell MD    Visit Date: 1/7/2020     Physician Orders: PT Eval and Treat pool therapy  Medical Diagnosis from Referral:Z98.890 (ICD-10-CM) - S/P lumbar microdiscectomy  Evaluation Date: 11/20/2019  Authorization Period Expiration: 12/31/2020  Plan of Care Expiration: 2/12/2020  Visit # / Visits authorized: 2/20  Total Visits: 14    Time In: 1400  Time Out: 1500  Total Billable Time:15 minutes    Precautions: fibromyalgia    PROCEDURES/IMAGING: 10/25/2019: R MIS L5-s1 microdisectomy  Subjective     Pt reports: I'm doing OK  He was compliant with home exercise program.  Response to previous treatment: muscle soreness x 1 day  Functional change: using staff for mobility    Pain: 6/10   Location: right back  & RLE    Objective     Raffy received aquatic therapeutic exercises to develop strength, endurance, ROM, flexibility, posture and core stabilization for 50 minutes including:    WARMUP x 2 laps each  Walk fwd/back/side    STRETCHES 2 x 20sec  Hamstring  Quad  Gastroc    LE EX x 20  Mini squat  Heel raise with GS  Hip abd/add-np  Hip flex/ext-np  LAQ with hip flex-np  HS curls-np  Step up forward x 12 each LE-np  Step up lateral x 12 each LE-np  Squat walk lateral  Orange Tband X 2 laps  Monster walks  Hendry Tband x 2 laps  Wobble board anterior/posterior x 20  Wobble board anterior posterior single LE x 15 each LE  Wobble board lateral  X 20   Wobble board single leg lateral x 15 each LE  Wobble board clockwise x 20  Wobble board counterclockwise x 20   Dive ring retrieval using L foot dorsiflexion & eversion for 4 reps x 2 sets  Leg press noodle/tubing x 20 each LE  Hip extension noodle/tubing x 20 each LE    UE/CORE EX x 20  Rows orange tubing - 2 sec hold- modified tandem stance  Shoulder  extension orange tubing- modified tandem stance  Truncal rotation double strand orange tubing  Mini squat hold with chest press blue disc- posterior support pool wall  Shoulder extension blue disc  Tennis racket to hit soft ball with weight shifting  x 30-np    BALANCE-np  Tandem gait forward // bars x 2 laps  Tandem gait backward // bars x 2 laps  Braiding x 2 laps  Heel walking // bars x 1 lap  Toe walking // bars x 1 lap  Gait // bars emphasis on heel strike & toe off x 2 laps-np    ENDURANCE  Bicycle // bars x  6 min    COOL DOWN x 1 lap each  Walk fwd/back/side      Home Exercises Provided and Patient Education Provided     Education provided:   Role of aquatic therapy  Hydration post therapy      Written Home Exercises Provided: yes.  Exercises were reviewed and Raffy was able to demonstrate them prior to the end of the session.  Raffy demonstrated good  understanding of the education provided.     See EMR under Patient Instructions for exercises provided 11/27/2019.; 12/3/2019(dead bug)    Assessment     Patient required intermittent verbal cues for TA activation with upright trunk for  leg press & hip extensiown using noodle/tubing . Patient did well post instruction. Improvement with balance when performing monster walks. Patient will benefit from aquatic environment to unload  spine for strengthening, core stabilization & postural awareness.      Raffy is progressing well towards his goals.   Pt prognosis is Fair.     Pt will continue to benefit from skilled aquatic outpatient physical therapy to address the deficits listed in the problem list box on initial evaluation, provide pt/family education and to maximize pt's level of independence in the home and community environment.     Pt's spiritual, cultural and educational needs considered and pt agreeable to plan of care and goals.    Anticipated barriers to physical therapy: chronic pain    Goals:   Short Term Goals: 6 weeks   1. Patient will be  independent in HEP & progressions (progressing)  2. Patient will achieve TUG score of 14 sec or less to demonstrate improved mobility (progressing)     Long Term Goals: 12 weeks   1. Patient will have FOTO limitation score of 55% to demonstrate improved overall function & decreased pain (progressing)  2. Patient will achieve FTSTS score of 20 sec & be able to perform all 5 sit/stand (progressing)  3. Patient will increase R hip/knee MMT to 4/5 to demonstrate improved strength (progressing)  4. Patient will increase R ankle df to 3+/5 to demonstrate improved strength & decreased gait deviations (progressing)    Plan   Increase  exercises slowly to prevent pain    Plan of care Certification: 11/20/2019 to 2/12/2020.     Outpatient aquatic Physical Therapy 1-2 times weekly for  12 weeks to include the following interventions: manual therapy, aquatic therapy, patient education, therapeutic exercise, therapeutic activities  Patient may be seen by PTA as part of rehabilitation team    Antonia Ca, PT

## 2020-01-09 NOTE — PROGRESS NOTES
Physical Therapy Daily Treatment Note     Name: Raffy Rutherford Jr.  Clinic Number: [id number]    Therapy Diagnosis:   Encounter Diagnosis   Name Primary?    Weakness of both lower extremities      Physician: Jason Caldwell MD    Visit Date: 1/10/2020     Physician Orders: PT Eval and Treat pool therapy  Medical Diagnosis from Referral:Z98.890 (ICD-10-CM) - S/P lumbar microdiscectomy  Evaluation Date: 11/20/2019  Authorization Period Expiration: 12/31/2020  Plan of Care Expiration: 2/12/2020  Visit # / Visits authorized: 3/20  Total Visits: 15    Time In: 1000  Time Out: 1100  Total Billable Time:60 minutes    Precautions: fibromyalgia    PROCEDURES/IMAGING: 10/25/2019: R MIS L5-s1 microdisectomy  Subjective     Pt reports: My right sciatica was really hurting yesterday but it is a bit better today  He was compliant with home exercise program.  Response to previous treatment: muscle soreness x 1 day  Functional change: using staff for mobility    Pain: 7/10   Location: right back  & RLE    Objective     Raffy received aquatic therapeutic exercises to develop strength, endurance, ROM, flexibility, posture and core stabilization for 50 minutes including:    WARMUP x 2 laps each  Walk fwd/back/side    STRETCHES 2 x 20sec  Hamstring  Quad  Gastroc    LE EX x 20  Mini squat  Heel raise with GS  Hip abd/add-np  Hip flex/ext-np  LAQ with hip flex-np  HS curls-np  Step up forward x 12 each LE-np  Step up lateral x 12 each LE-np  Squat walk lateral  [place] Tband X 2 laps-np  Monster walks  [place]ange Tband x 2 laps-np  Wobble board anterior/posterior x 20  Wobble board anterior posterior single LE x 15 each LE-np  Wobble board lateral  X 20   Wobble board single leg lateral x 15 each LE-np  Wobble board clockwise x 20  Wobble board counterclockwise x 20   Dive ring retrieval using L foot dorsiflexion & eversion for 4 reps x 3 sets  Leg press noodle/tubing x 20 each LE  Hip extension noodle/tubing x 20 each LE    UE/CORE EX x  20  Rows lime tubing - 2 sec hold- modified tandem stance  Shoulder extension lime tubing- modified tandem stance  Truncal rotation double strand lime tubing  Mini squat hold with chest press blue disc- posterior support pool wall  Shoulder extension blue disc  Tennis racket to hit soft ball with weight shifting  x 30-np  DKTC to hip extension // bars x 10     BALANCE-np  Tandem gait forward // bars x 2 laps  Tandem gait backward // bars x 2 laps  Braiding x 2 laps  Heel walking // bars x 1 lap-np  Toe walking // bars x 1 lap-np  Gait // bars emphasis on heel strike & toe off x 2 laps-np    ENDURANCE  Bicycle // bars x  6 min    COOL DOWN x 1 lap each  Walk fwd/back/side      Home Exercises Provided and Patient Education Provided     Education provided:   Role of aquatic therapy  Hydration post therapy      Written Home Exercises Provided: yes.  Exercises were reviewed and Raffy was able to demonstrate them prior to the end of the session.  Raffy demonstrated good  understanding of the education provided.     See EMR under Patient Instructions for exercises provided 11/27/2019.; 12/3/2019(dead bug)    Assessment   Patient presents with increased RLE radicular pain & some exercises not performed to prevent increased radicular pain  Patient able to perform selected exercises with no increase in pain, Patient will benefit from aquatic environment to unload  spine for strengthening, core stabilization & postural awareness.      Raffy is progressing well towards his goals.   Pt prognosis is Fair.     Pt will continue to benefit from skilled aquatic outpatient physical therapy to address the deficits listed in the problem list box on initial evaluation, provide pt/family education and to maximize pt's level of independence in the home and community environment.     Pt's spiritual, cultural and educational needs considered and pt agreeable to plan of care and goals.    Anticipated barriers to physical therapy: chronic  pain    Goals:   Short Term Goals: 6 weeks   1. Patient will be independent in HEP & progressions (progressing)  2. Patient will achieve TUG score of 14 sec or less to demonstrate improved mobility (progressing)     Long Term Goals: 12 weeks   1. Patient will have FOTO limitation score of 55% to demonstrate improved overall function & decreased pain (progressing)  2. Patient will achieve FTSTS score of 20 sec & be able to perform all 5 sit/stand (progressing)  3. Patient will increase R hip/knee MMT to 4/5 to demonstrate improved strength (progressing)  4. Patient will increase R ankle df to 3+/5 to demonstrate improved strength & decreased gait deviations (progressing)    Plan   Increase  exercises slowly to prevent pain    Plan of care Certification: 11/20/2019 to 2/12/2020.     Outpatient aquatic Physical Therapy 1-2 times weekly for  12 weeks to include the following interventions: manual therapy, aquatic therapy, patient education, therapeutic exercise, therapeutic activities  Patient may be seen by PTA as part of rehabilitation team    Antonia Ca, PT

## 2020-01-10 ENCOUNTER — CLINICAL SUPPORT (OUTPATIENT)
Dept: REHABILITATION | Facility: HOSPITAL | Age: 70
End: 2020-01-10
Attending: NEUROLOGICAL SURGERY
Payer: COMMERCIAL

## 2020-01-10 DIAGNOSIS — R29.898 WEAKNESS OF BOTH LOWER EXTREMITIES: ICD-10-CM

## 2020-01-10 PROCEDURE — 97113 AQUATIC THERAPY/EXERCISES: CPT

## 2020-01-13 ENCOUNTER — CLINICAL SUPPORT (OUTPATIENT)
Dept: REHABILITATION | Facility: HOSPITAL | Age: 70
End: 2020-01-13
Attending: NEUROLOGICAL SURGERY
Payer: COMMERCIAL

## 2020-01-13 DIAGNOSIS — R29.898 WEAKNESS OF BOTH LOWER EXTREMITIES: ICD-10-CM

## 2020-01-13 PROCEDURE — 97113 AQUATIC THERAPY/EXERCISES: CPT

## 2020-01-13 NOTE — PROGRESS NOTES
Physical Therapy Daily Treatment Note     Name: Raffy Rutherford Jr.  Clinic Number: [id number]    Therapy Diagnosis:   Encounter Diagnosis   Name Primary?    Weakness of both lower extremities      Physician: Jason Caldwell MD    Visit Date: 1/13/2020     Physician Orders: PT Eval and Treat pool therapy  Medical Diagnosis from Referral:Z98.890 (ICD-10-CM) - S/P lumbar microdiscectomy  Evaluation Date: 11/20/2019  Authorization Period Expiration: 12/31/2020  Plan of Care Expiration: 2/12/2020  Visit # / Visits authorized: 4/20  Total Visits: 16    Time In: 1500  Time Out: 1600  Total Billable Time:30 minutes    Precautions: fibromyalgia    PROCEDURES/IMAGING: 10/25/2019: R MIS L5-s1 microdisectomy  Subjective     Pt reports: My right leg feels better today  He was compliant with home exercise program.  Response to previous treatment: muscle soreness x 1 day  Functional change: using staff for mobility    Pain: 5/10   Location: right back  & RLE    Objective     Raffy received aquatic therapeutic exercises to develop strength, endurance, ROM, flexibility, posture and core stabilization for 50 minutes including:    WARMUP x 2 laps each  Walk fwd/back/side    STRETCHES 2 x 20sec  Hamstring  Quad  Gastroc    LE EX x 20  Mini squat  Heel raise with GS  Hip abd/add-np  Hip flex/ext-np  LAQ with hip flex-np  HS curls-np  Step up forward x 12 each LE-np  Step up lateral x 12 each LE-np  Squat walk lateral  [place] Tband X 2 laps-np  Monster walks  Orange Tband x 2 laps-np  Wobble board anterior/posterior x 20  Wobble board anterior posterior single LE x 15 each LE-np  Wobble board lateral  X 20   Wobble board single leg lateral x 15 each LE-np  Wobble board clockwise x 20  Wobble board counterclockwise x 20   Dive ring retrieval using L foot dorsiflexion & eversion for 4 reps x 3 sets  Leg press noodle/tubing x 20 each LE  Hip extension noodle/tubing x 20 each LE    UE/CORE EX x 20  Rows lime tubing - 2 sec hold- modified  tandem stance  Shoulder extension lime tubing- modified tandem stance  Truncal rotation double strand lime tubing  Mini squat hold with chest press blue disc- posterior support pool wall  Shoulder extension blue disc  Tennis racket to hit soft ball with weight shifting  x 30-np  DKTC to hip extension // bars x 10 -np    BALANCE  Tandem gait forward // bars x 2 laps  Tandem gait backward // bars x 2 laps  Braiding x 2 laps-np  Heel walking // bars x 1 lap-np  Toe walking // bars x 1 lap-np  Gait // bars emphasis on heel strike & toe off x 2 laps-np    ENDURANCE  Bicycle // bars x  6 min    COOL DOWN x 1 lap each  Walk fwd/back/side      Home Exercises Provided and Patient Education Provided     Education provided:   Role of aquatic therapy  Hydration post therapy      Written Home Exercises Provided: yes.  Exercises were reviewed and Raffy was able to demonstrate them prior to the end of the session.  Raffy demonstrated good  understanding of the education provided.     See EMR under Patient Instructions for exercises provided 11/27/2019.; 12/3/2019(dead bug)    Assessment   Patient was able to perform most exercises with only slight increased RLE radicular pain with monster walks with orange Tband resistance Patient will benefit from aquatic environment to unload  spine for strengthening, core stabilization & postural awareness.      Raffy is progressing well towards his goals.   Pt prognosis is Fair.     Pt will continue to benefit from skilled aquatic outpatient physical therapy to address the deficits listed in the problem list box on initial evaluation, provide pt/family education and to maximize pt's level of independence in the home and community environment.     Pt's spiritual, cultural and educational needs considered and pt agreeable to plan of care and goals.    Anticipated barriers to physical therapy: chronic pain    Goals:   Short Term Goals: 6 weeks   1. Patient will be independent in HEP &  progressions (progressing)  2. Patient will achieve TUG score of 14 sec or less to demonstrate improved mobility (progressing)     Long Term Goals: 12 weeks   1. Patient will have FOTO limitation score of 55% to demonstrate improved overall function & decreased pain (progressing)  2. Patient will achieve FTSTS score of 20 sec & be able to perform all 5 sit/stand (progressing)  3. Patient will increase R hip/knee MMT to 4/5 to demonstrate improved strength (progressing)  4. Patient will increase R ankle df to 3+/5 to demonstrate improved strength & decreased gait deviations (progressing)    Plan   Increase  exercises slowly to prevent pain    Plan of care Certification: 11/20/2019 to 2/12/2020.     Outpatient aquatic Physical Therapy 1-2 times weekly for  12 weeks to include the following interventions: manual therapy, aquatic therapy, patient education, therapeutic exercise, therapeutic activities  Patient may be seen by PTA as part of rehabilitation team    Antonia Ca, PT

## 2020-01-15 ENCOUNTER — CLINICAL SUPPORT (OUTPATIENT)
Dept: REHABILITATION | Facility: HOSPITAL | Age: 70
End: 2020-01-15
Attending: NEUROLOGICAL SURGERY
Payer: COMMERCIAL

## 2020-01-15 DIAGNOSIS — R29.898 WEAKNESS OF BOTH LOWER EXTREMITIES: ICD-10-CM

## 2020-01-15 PROCEDURE — 97113 AQUATIC THERAPY/EXERCISES: CPT

## 2020-01-15 NOTE — PROGRESS NOTES
Physical Therapy Daily Treatment Note     Name: Raffy Rutherford Jr.  Clinic Number: [id number]    Therapy Diagnosis:   Encounter Diagnosis   Name Primary?    Weakness of both lower extremities      Physician: Jason Caldwell MD    Visit Date: 1/15/2020      Physician Orders: PT Eval and Treat pool therapy  Medical Diagnosis from Referral:Z98.890 (ICD-10-CM) - S/P lumbar microdiscectomy  Evaluation Date: 11/20/2019  Authorization Period Expiration: 12/31/2020  Plan of Care Expiration: 2/12/2020  Visit # / Visits authorized: 5/20  Total Visits: 17    Time In: 1100  Time Out: 1200  Total Billable Time:30 minutes    Precautions: fibromyalgia    PROCEDURES/IMAGING: 10/25/2019: R MIS L5-s1 microdisectomy  Subjective     Pt reports: I'm leaving tomorrow & flying to [place] to visit friends  He was compliant with home exercise program.  Response to previous treatment: muscle soreness x 1 day  Functional change: intermittently using cane    Pain: 5/10   Location: right back  & RLE    Objective     Raffy received aquatic therapeutic exercises to develop strength, endurance, ROM, flexibility, posture and core stabilization for 50 minutes including:    WARMUP x 2 laps each  Walk fwd/back/side    STRETCHES 2 x 20sec  Hamstring  Quad  Gastroc    LE EX x 20  Mini squat  Heel raise with GS  Hip abd/add-np  Hip flex/ext-np  LAQ with hip flex-np  HS curls-np  Step up forward x 12 each LE  Step up lateral x 12 each LE  Squat walk lateral  [place] Tband X 2 laps-np  Monster walks  [place]ange Tband x 2 laps-np  Wobble board anterior/posterior x 20  Wobble board anterior posterior single LE x 15 each LE-np  Wobble board lateral  X 20   Wobble board single leg lateral x 15 each LE-np  Wobble board clockwise x 20  Wobble board counterclockwise x 20   Dive ring retrieval using L foot dorsiflexion & eversion for 4 reps x 3 sets  Leg press noodle/tubing x 20 each LE  Hip extension noodle/tubing x 20 each LE    UE/CORE EX x 20  Rows lime tubing -  2 sec hold- modified tandem stance  Shoulder extension lime tubing- modified tandem stance  Truncal rotation double strand lime tubing  Mini squat hold with chest press blue disc- posterior support pool wall  Shoulder extension blue disc  Tennis racket to hit soft ball with weight shifting  x 30-np  DKTC to hip extension // bars x 10 -np    BALANCE  Tandem gait forward // bars x 2 laps  Tandem gait backward // bars x 2 laps  Braiding x 2 laps-np  Heel walking // bars x 1 lap  Toe walking // bars x 1 lap  Gait // bars emphasis on heel strike & toe off x 2 laps-np    ENDURANCE  Bicycle // bars x  6 min    COOL DOWN x 1 lap each  Walk fwd/back/side      Home Exercises Provided and Patient Education Provided     Education provided:   Role of aquatic therapy  Hydration post therapy      Written Home Exercises Provided: yes.  Exercises were reviewed and Raffy was able to demonstrate them prior to the end of the session.  Raffy demonstrated good  understanding of the education provided.     See EMR under Patient Instructions for exercises provided 11/27/2019.; 12/3/2019(dead bug)    Assessment   Patient was able to perform most exercises with only slight increased RLE radicular pain. Patient did not perform lateral or monster walks 2/2 increased RLE radiuclar pain last visit. Patient will benefit from aquatic environment to unload  spine for strengthening, core stabilization & postural awareness.      Raffy is progressing well towards his goals.   Pt prognosis is Fair.     Pt will continue to benefit from skilled aquatic outpatient physical therapy to address the deficits listed in the problem list box on initial evaluation, provide pt/family education and to maximize pt's level of independence in the home and community environment.     Pt's spiritual, cultural and educational needs considered and pt agreeable to plan of care and goals.    Anticipated barriers to physical therapy: chronic pain    Goals:   Short Term  Goals: 6 weeks   1. Patient will be independent in HEP & progressions (progressing)  2. Patient will achieve TUG score of 14 sec or less to demonstrate improved mobility (progressing)     Long Term Goals: 12 weeks   1. Patient will have FOTO limitation score of 55% to demonstrate improved overall function & decreased pain (progressing)  2. Patient will achieve FTSTS score of 20 sec & be able to perform all 5 sit/stand (progressing)  3. Patient will increase R hip/knee MMT to 4/5 to demonstrate improved strength (progressing)  4. Patient will increase R ankle df to 3+/5 to demonstrate improved strength & decreased gait deviations (progressing)    Plan   Increase  exercises slowly to prevent pain    Plan of care Certification: 11/20/2019 to 2/12/2020.     Outpatient aquatic Physical Therapy 1-2 times weekly for  12 weeks to include the following interventions: manual therapy, aquatic therapy, patient education, therapeutic exercise, therapeutic activities  Patient may be seen by PTA as part of rehabilitation team    Antonia Ca, PT

## 2020-01-16 ENCOUNTER — TELEPHONE (OUTPATIENT)
Dept: PRIMARY CARE CLINIC | Facility: CLINIC | Age: 70
End: 2020-01-16

## 2020-01-16 ENCOUNTER — TELEPHONE (OUTPATIENT)
Dept: PHARMACY | Facility: CLINIC | Age: 70
End: 2020-01-16

## 2020-01-17 ENCOUNTER — TELEPHONE (OUTPATIENT)
Dept: PRIMARY CARE CLINIC | Facility: CLINIC | Age: 70
End: 2020-01-17

## 2020-01-17 NOTE — PROGRESS NOTES
MERCEDES Costello, contacted Mr. Rutherford to remind patient of his appointment on 01/20/2020 at 11:00 am. Patient stated he needs to cancel, he is out of town.  Will call back to reschedule.

## 2020-01-22 ENCOUNTER — CLINICAL SUPPORT (OUTPATIENT)
Dept: REHABILITATION | Facility: HOSPITAL | Age: 70
End: 2020-01-22
Attending: NEUROLOGICAL SURGERY
Payer: COMMERCIAL

## 2020-01-22 DIAGNOSIS — R29.898 WEAKNESS OF BOTH LOWER EXTREMITIES: ICD-10-CM

## 2020-01-22 PROCEDURE — 97113 AQUATIC THERAPY/EXERCISES: CPT

## 2020-01-22 NOTE — PROGRESS NOTES
Physical Therapy Daily Treatment Note     Name: Raffy Rutherford Jr.  Clinic Number: [id number]    Therapy Diagnosis:   Encounter Diagnosis   Name Primary?    Weakness of both lower extremities      Physician: Jason Caldwell MD    Visit Date: 1/22/2020      Physician Orders: PT Eval and Treat pool therapy  Medical Diagnosis from Referral:Z98.890 (ICD-10-CM) - S/P lumbar microdiscectomy  Evaluation Date: 11/20/2019  Authorization Period Expiration: 12/31/2020  Plan of Care Expiration: 2/12/2020  Visit # / Visits authorized: 6/20  Total Visits: 18    Time In: 1100  Time Out: 1200  Total Billable Time:30 minutes    Precautions: fibromyalgia    PROCEDURES/IMAGING: 10/25/2019: R MIS L5-s1 microdisectomy  Subjective     Pt reports: I did OK on the plane flight but I wore my back support belt  He was compliant with home exercise program.  Response to previous treatment: muscle soreness x 1 day  Functional change: intermittently using cane    Pain: 6/10   Location: right back  & RLE    Objective     Raffy received aquatic therapeutic exercises to develop strength, endurance, ROM, flexibility, posture and core stabilization for 50 minutes including:    WARMUP x 2 laps each  Walk fwd/back/side    STRETCHES 2 x 20sec  Hamstring  Quad  Gastroc    LE EX x 20  Mini squat  Heel raise with GS  Hip abd/add-np  Hip flex/ext-np  LAQ with hip flex-np  HS curls-np  Step up forward x 12 each LE  Step up lateral x 12 each LE  Squat walk lateral  [place] Tband X 2 laps-np  Monster walks  [place] Tband x 2 laps-np  Wobble board anterior/posterior x 20  Wobble board anterior posterior single LE x 15 each LE-np  Wobble board lateral  X 20   Wobble board single leg lateral x 15 each LE-np  Wobble board clockwise x 20  Wobble board counterclockwise x 20   Dive ring retrieval using L foot dorsiflexion & eversion for 4 reps x 3 sets  Leg press noodle/tubing x 20 each LE  Hip extension noodle/tubing x 20 each LE    UE/CORE EX x 20  Rows lime tubing  - 2 sec hold- modified tandem stance  Shoulder extension lime tubing- modified tandem stance  Truncal rotation double strand lime tubing  Mini squat hold with chest press blue disc- posterior support pool wall  Shoulder extension blue disc  Tennis racket to hit soft ball with weight shifting  x 30  DKTC to hip extension // bars x 10 -np    BALANCE  Tandem gait forward // bars x 2 laps  Tandem gait backward // bars x 2 laps  Braiding x 2 laps  Heel walking // bars x 1 lap  Toe walking // bars x 1 lap  Gait // bars emphasis on heel strike & toe off x 2 laps-np    ENDURANCE  Bicycle // bars x  6 min    COOL DOWN x 1 lap each  Walk fwd/back/side      Home Exercises Provided and Patient Education Provided     Education provided:   Role of aquatic therapy  Hydration post therapy      Written Home Exercises Provided: yes.  Exercises were reviewed and Raffy was able to demonstrate them prior to the end of the session.  Raffy demonstrated good  understanding of the education provided.     See EMR under Patient Instructions for exercises provided 11/27/2019.; 12/3/2019(dead bug)    Assessment   Patient was able to perform most exercises with only slight increased R hip pain. Reviewed & issued R ITB stretches. Patient has good effort with all exercises. Patient will benefit from aquatic environment to unload  spine for strengthening, core stabilization & postural awareness.      Raffy is progressing well towards his goals.   Pt prognosis is Fair.     Pt will continue to benefit from skilled aquatic outpatient physical therapy to address the deficits listed in the problem list box on initial evaluation, provide pt/family education and to maximize pt's level of independence in the home and community environment.     Pt's spiritual, cultural and educational needs considered and pt agreeable to plan of care and goals.    Anticipated barriers to physical therapy: chronic pain    Goals:   Short Term Goals: 6 weeks   1. Patient  will be independent in HEP & progressions (progressing)  2. Patient will achieve TUG score of 14 sec or less to demonstrate improved mobility (progressing)     Long Term Goals: 12 weeks   1. Patient will have FOTO limitation score of 55% to demonstrate improved overall function & decreased pain (progressing)  2. Patient will achieve FTSTS score of 20 sec & be able to perform all 5 sit/stand (progressing)  3. Patient will increase R hip/knee MMT to 4/5 to demonstrate improved strength (progressing)  4. Patient will increase R ankle df to 3+/5 to demonstrate improved strength & decreased gait deviations (progressing)    Plan   Increase  exercises slowly to prevent pain    Plan of care Certification: 11/20/2019 to 2/12/2020.     Outpatient aquatic Physical Therapy 1-2 times weekly for  12 weeks to include the following interventions: manual therapy, aquatic therapy, patient education, therapeutic exercise, therapeutic activities  Patient may be seen by PTA as part of rehabilitation team    Antonia Ca, PT

## 2020-01-24 ENCOUNTER — CLINICAL SUPPORT (OUTPATIENT)
Dept: REHABILITATION | Facility: HOSPITAL | Age: 70
End: 2020-01-24
Attending: NEUROLOGICAL SURGERY
Payer: COMMERCIAL

## 2020-01-24 DIAGNOSIS — R29.898 WEAKNESS OF BOTH LOWER EXTREMITIES: ICD-10-CM

## 2020-01-24 PROCEDURE — 97113 AQUATIC THERAPY/EXERCISES: CPT

## 2020-01-24 NOTE — PROGRESS NOTES
Physical Therapy Daily Treatment Note     Name: Raffy Rutherford Jr.  Clinic Number: [id number]    Therapy Diagnosis:   Encounter Diagnosis   Name Primary?    Weakness of both lower extremities      Physician: Jason Caldwell MD    Visit Date: 1/24/2020      Physician Orders: PT Eval and Treat pool therapy  Medical Diagnosis from Referral:Z98.890 (ICD-10-CM) - S/P lumbar microdiscectomy  Evaluation Date: 11/20/2019  Authorization Period Expiration: 12/31/2020  Plan of Care Expiration: 2/12/2020  Visit # / Visits authorized: 7/20  Total Visits: 19    Time In: 0900  Time Out: 1000  Total Billable Time:30 minutes    Precautions: fibromyalgia    PROCEDURES/IMAGING: 10/25/2019: R MIS L5-s1 microdisectomy  Subjective     Pt reports: I sat a lot yesterday @ work & I'm hurting today  He was compliant with home exercise program.  Response to previous treatment: muscle soreness x 1 day  Functional change: intermittently using cane    Pain: 7/10   Location: right back  & RLE    Objective     Raffy received aquatic therapeutic exercises to develop strength, endurance, ROM, flexibility, posture and core stabilization for 50 minutes including:    WARMUP x 2 laps each  Walk fwd/back/side    STRETCHES 2 x 20sec  Hamstring  Quad  Gastroc    LE EX x 20  Mini squat  Heel raise with GS  Hip abd/add-np  Hip flex/ext-np  LAQ with hip flex-np  HS curls-np  Step up forward x 12 each LE  Step up lateral x 12 each LE  Squat walk lateral  [place] Tband X 2 laps-np  Monster walks  Orange Tband x 2 laps-np  Wobble board anterior/posterior x 20  Wobble board anterior posterior single LE x 15 each LE-np  Wobble board lateral  X 20   Wobble board single leg lateral x 15 each LE-np  Wobble board clockwise x 20  Wobble board counterclockwise x 20   Dive ring retrieval using L foot dorsiflexion & eversion for 4 reps x 3 sets  Leg press noodle/tubing x 20 each LE  Hip extension noodle/tubing x 20 each LE    UE/CORE EX x 20  Rows lime tubing - 2 sec  hold- modified tandem stance  Shoulder extension lime tubing- modified tandem stance  Truncal rotation double strand lime tubing  Mini squat hold with chest press blue disc- posterior support pool wall  Shoulder extension blue disc  Tennis racket to hit soft ball with weight shifting  x 30  DKTC to hip extension // bars x 10 -np    BALANCE  Tandem gait forward // bars x 2 laps  Tandem gait backward // bars x 2 laps  Braiding x 2 laps  Heel walking // bars x 1 lap  Toe walking // bars x 1 lap  Gait // bars emphasis on heel strike & toe off x 2 laps-np    ENDURANCE  Bicycle // bars x  6 min    COOL DOWN x 1 lap each  Walk fwd/back/side      Home Exercises Provided and Patient Education Provided     Education provided:   Role of aquatic therapy  Hydration post therapy      Written Home Exercises Provided: yes.  Exercises were reviewed and Raffy was able to demonstrate them prior to the end of the session.  Raffy demonstrated good  understanding of the education provided.     See EMR under Patient Instructions for exercises provided 11/27/2019.; 12/3/2019(dead bug)    Assessment   Patient was able to perform exercise program with no increase in pain. Patient performed tandem gait with intermittent UE support. Patient was compliant with new ITB exercises. Patient has good effort with all exercises. Patient will benefit from aquatic environment to unload  spine for strengthening, core stabilization & postural awareness.      Raffy is progressing well towards his goals.   Pt prognosis is Fair.     Pt will continue to benefit from skilled aquatic outpatient physical therapy to address the deficits listed in the problem list box on initial evaluation, provide pt/family education and to maximize pt's level of independence in the home and community environment.     Pt's spiritual, cultural and educational needs considered and pt agreeable to plan of care and goals.    Anticipated barriers to physical therapy: chronic  pain    Goals:   Short Term Goals: 6 weeks   1. Patient will be independent in HEP & progressions (progressing)  2. Patient will achieve TUG score of 14 sec or less to demonstrate improved mobility (progressing)     Long Term Goals: 12 weeks   1. Patient will have FOTO limitation score of 55% to demonstrate improved overall function & decreased pain (progressing)  2. Patient will achieve FTSTS score of 20 sec & be able to perform all 5 sit/stand (progressing)  3. Patient will increase R hip/knee MMT to 4/5 to demonstrate improved strength (progressing)  4. Patient will increase R ankle df to 3+/5 to demonstrate improved strength & decreased gait deviations (progressing)    Plan   Increase  UE tubing resistance    Plan of care Certification: 11/20/2019 to 2/12/2020.     Outpatient aquatic Physical Therapy 1-2 times weekly for  12 weeks to include the following interventions: manual therapy, aquatic therapy, patient education, therapeutic exercise, therapeutic activities  Patient may be seen by PTA as part of rehabilitation team    Antonia Ca, PT

## 2020-01-26 ENCOUNTER — PATIENT MESSAGE (OUTPATIENT)
Dept: UROLOGY | Facility: CLINIC | Age: 70
End: 2020-01-26

## 2020-01-27 ENCOUNTER — CLINICAL SUPPORT (OUTPATIENT)
Dept: REHABILITATION | Facility: HOSPITAL | Age: 70
End: 2020-01-27
Attending: NEUROLOGICAL SURGERY
Payer: COMMERCIAL

## 2020-01-27 DIAGNOSIS — R29.898 WEAKNESS OF BOTH LOWER EXTREMITIES: ICD-10-CM

## 2020-01-27 PROCEDURE — 97113 AQUATIC THERAPY/EXERCISES: CPT

## 2020-01-27 NOTE — PROGRESS NOTES
Physical Therapy Daily Treatment Note     Name: Raffy Rutherford Jr.  Clinic Number: [id number]    Therapy Diagnosis:   Encounter Diagnosis   Name Primary?    Weakness of both lower extremities      Physician: Jason Caldwell MD    Visit Date: 1/27/2020      Physician Orders: PT Eval and Treat pool therapy  Medical Diagnosis from Referral:Z98.890 (ICD-10-CM) - S/P lumbar microdiscectomy  Evaluation Date: 11/20/2019  Authorization Period Expiration: 12/31/2020  Plan of Care Expiration: 2/12/2020  Visit # / Visits authorized: 8/20  Total Visits: 20    Time In: 1000  Time Out: 1100  Total Billable Time:30 minutes    Precautions: fibromyalgia    PROCEDURES/IMAGING: 10/25/2019: R MIS L5-s1 microdisectomy  Subjective     Pt reports: My R hip is hurting  He was compliant with home exercise program.  Response to previous treatment: muscle soreness x 1 day  Functional change: intermittently using cane    Pain: 7/10   Location: right back  & RLE    Objective     Raffy received aquatic therapeutic exercises to develop strength, endurance, ROM, flexibility, posture and core stabilization for 60 minutes including:    WARMUP x 2 laps each  Walk fwd/back/side    STRETCHES 2 x 20sec  Hamstring  Quad  Gastroc    LE EX x 20  Mini squat-np  Heel raise with GS-np  Hip abd/add-np  Hip flex/ext-np  LAQ with hip flex-np  HS curls-np  Step up forward x 12 each LE-np  Step up lateral x 12 each LE-np  Squat walk lateral green Tband X 2 laps  Monster walks  green Tband x 2 laps  Wobble board anterior/posterior x 20  Wobble board anterior posterior single LE x 15 each LE-np  Wobble board lateral  X 20   Wobble board single leg lateral x 15 each LE-np  Wobble board clockwise x 20  Wobble board counterclockwise x 20   Dive ring retrieval using L foot dorsiflexion & eversion for 4 reps x 3 sets  Leg press noodle/tubing x 20 each LE  Hip extension noodle/tubing x 20 each LE    UE/CORE EX x 20  Rows blue tubing - 2 sec hold- modified tandem  stance  Shoulder extension blue tubing- modified tandem stance  Truncal rotation double strand lime tubing  Mini squat hold with chest press blue disc- posterior support pool wall  Shoulder extension blue disc  Tennis racket to hit soft ball with weight shifting  x 30  DKTC to hip extension // bars x 10 -np    BALANCE  Tandem gait forward // bars x 2 laps-np  Tandem gait backward // bars x 2 laps-np  Tandem gait forward/ back standing on small diameter noodle x 10  Braiding x 2 laps  Heel walking // bars x 1 lap  Toe walking // bars x 1 lap  Gait // bars emphasis on heel strike & toe off x 2 laps-np    ENDURANCE  Bicycle // bars x  6 min    COOL DOWN x 1 lap each  Walk fwd/back/side      Home Exercises Provided and Patient Education Provided     Education provided:   Role of aquatic therapy  Hydration post therapy      Written Home Exercises Provided: yes.  Exercises were reviewed and Raffy was able to demonstrate them prior to the end of the session.  Raffy demonstrated good  understanding of the education provided.     See EMR under Patient Instructions for exercises provided 11/27/2019.; 12/3/2019(dead bug)    Assessment   FOTO limitation score 56%(was 57%) indicative of some functional improvement  Patient was able to perform exercise program with no increase in pain. Patient was able to tolerate increased resistance for squat & monster walks. Patient has good effort with all exercises. Patient will benefit from aquatic environment to unload  spine for strengthening, core stabilization & postural awareness.      Raffy is progressing well towards his goals.   Pt prognosis is Fair.     Pt will continue to benefit from skilled aquatic outpatient physical therapy to address the deficits listed in the problem list box on initial evaluation, provide pt/family education and to maximize pt's level of independence in the home and community environment.     Pt's spiritual, cultural and educational needs considered and  pt agreeable to plan of care and goals.    Anticipated barriers to physical therapy: chronic pain    Goals:   Short Term Goals: 6 weeks   1. Patient will be independent in HEP & progressions (progressing)  2. Patient will achieve TUG score of 14 sec or less to demonstrate improved mobility (progressing)     Long Term Goals: 12 weeks   1. Patient will have FOTO limitation score of 55% to demonstrate improved overall function & decreased pain (progressing)  2. Patient will achieve FTSTS score of 20 sec & be able to perform all 5 sit/stand (progressing)  3. Patient will increase R hip/knee MMT to 4/5 to demonstrate improved strength (progressing)  4. Patient will increase R ankle df to 3+/5 to demonstrate improved strength & decreased gait deviations (progressing)    Plan   Increase  Resistance as tolerated    Plan of care Certification: 11/20/2019 to 2/12/2020.     Outpatient aquatic Physical Therapy 1-2 times weekly for  12 weeks to include the following interventions: manual therapy, aquatic therapy, patient education, therapeutic exercise, therapeutic activities  Patient may be seen by PTA as part of rehabilitation team    Antonia Ca, PT

## 2020-02-04 ENCOUNTER — TELEPHONE (OUTPATIENT)
Dept: PRIMARY CARE CLINIC | Facility: CLINIC | Age: 70
End: 2020-02-04

## 2020-02-05 ENCOUNTER — CLINICAL SUPPORT (OUTPATIENT)
Dept: REHABILITATION | Facility: HOSPITAL | Age: 70
End: 2020-02-05
Attending: NEUROLOGICAL SURGERY
Payer: COMMERCIAL

## 2020-02-05 DIAGNOSIS — R29.898 WEAKNESS OF BOTH LOWER EXTREMITIES: ICD-10-CM

## 2020-02-05 PROCEDURE — 97113 AQUATIC THERAPY/EXERCISES: CPT

## 2020-02-05 NOTE — PROGRESS NOTES
MERCEDES Costello, contacted Mr. Rutherford to schedule his next appointment with Bean Alfred LCSW.  MERCEDES left a voicemail requesting a call back.

## 2020-02-05 NOTE — PROGRESS NOTES
Physical Therapy Daily Treatment Note     Name: Raffy Rutherford Jr.  Clinic Number: [id number]    Therapy Diagnosis:   Encounter Diagnosis   Name Primary?    Weakness of both lower extremities      Physician: Jason Caldwell MD    Visit Date: 2/5/2020      Physician Orders: PT Eval and Treat pool therapy  Medical Diagnosis from Referral:Z98.890 (ICD-10-CM) - S/P lumbar microdiscectomy  Evaluation Date: 11/20/2019  Authorization Period Expiration: 12/31/2020  Plan of Care Expiration: 2/12/2020  Visit # / Visits authorized: 9/20  Total Visits: 21    Time In: 1100  Time Out: 1200  Total Billable Time:60 minutes    Precautions: fibromyalgia    PROCEDURES/IMAGING: 10/25/2019: R MIS L5-s1 microdisectomy  Subjective     Pt reports: I'm feeling better, I had a cold/sinus problems for 4 days last week  He was compliant with home exercise program.  Response to previous treatment: muscle soreness x 1 day  Functional change: intermittently using cane    Pain: 5/10   Location: right back  & RLE    Objective     Raffy received aquatic therapeutic exercises to develop strength, endurance, ROM, flexibility, posture and core stabilization for 60 minutes including:    WARMUP x 2 laps each  Walk fwd/back/side    STRETCHES 2 x 20sec  Hamstring  Quad  Gastroc    LE EX x 20  Mini squat-np  Heel raise with GS-np  Hip abd/add-np  Hip flex/ext-np  LAQ with hip flex-np  HS curls-np  Step up forward Box x 10 each LE  Step up lateral  Boxx 12 each LE  Squat walk lateral green Tband X 2 laps  Monster walks  green Tband x 2 laps  Wobble board anterior/posterior x 20  Wobble board anterior posterior single LE x 15 each LE  Wobble board lateral  X 20   Wobble board single leg lateral x 15 each LE  Wobble board clockwise x 20  Wobble board counterclockwise x 20   Dive ring retrieval using L foot dorsiflexion & eversion for 4 reps x 3 sets  Leg press noodle/tubing x 20 each LE  Hip extension noodle/tubing x 20 each LE    UE/CORE EX x 20  Rows blue  tubing - 2 sec hold- modified tandem stance  Shoulder extension blue tubing- modified tandem stance  Chest press blue tubing- modified tandem stance  Truncal rotation double strand blue tubing  Mini squat hold with chest press small red kick board- posterior support pool wall  Shoulder extension small red kickboard  Tennis racket to hit soft ball with weight shifting  x 30-np  DKTC to hip extension // bars x 10 -np    BALANCE  Tandem gait forward // bars x 2 laps-np  Tandem gait backward // bars x 2 laps-np  Tandem gait forward/ back standing on small diameter noodle x 10  Braiding x 2 laps-np  Heel walking // bars x 1 lap-np  Toe walking // bars x 1 lap-np  Gait // bars emphasis on heel strike & toe off x 2 laps-np    ENDURANCE  Bicycle // bars x  6 min    COOL DOWN x 1 lap each  Walk fwd/back/side      Home Exercises Provided and Patient Education Provided     Education provided:   Role of aquatic therapy  Hydration post therapy      Written Home Exercises Provided: yes.  Exercises were reviewed and Raffy was able to demonstrate them prior to the end of the session.  Raffy demonstrated good  understanding of the education provided.     See EMR under Patient Instructions for exercises provided 11/27/2019.; 12/3/2019(dead bug)    Assessment   FOTO limitation score 56%(was 57%) indicative of some functional improvement  Patient was able to perform exercise program with only slight increase in hip pain with monster & squat walks. Patient able to tolerate addition of step ups on the box with no increase in pain. Intermittent verbal cues for upright trunk with UE tubing exercises. Patient did well post instruction      Raffy is progressing well towards his goals.   Pt prognosis is Fair.     Pt will continue to benefit from skilled aquatic outpatient physical therapy to address the deficits listed in the problem list box on initial evaluation, provide pt/family education and to maximize pt's level of independence in  the home and community environment.     Pt's spiritual, cultural and educational needs considered and pt agreeable to plan of care and goals.    Anticipated barriers to physical therapy: chronic pain    Goals:   Short Term Goals: 6 weeks   1. Patient will be independent in HEP & progressions (progressing)  2. Patient will achieve TUG score of 14 sec or less to demonstrate improved mobility (progressing)     Long Term Goals: 12 weeks   1. Patient will have FOTO limitation score of 55% to demonstrate improved overall function & decreased pain (progressing)  2. Patient will achieve FTSTS score of 20 sec & be able to perform all 5 sit/stand (progressing)  3. Patient will increase R hip/knee MMT to 4/5 to demonstrate improved strength (progressing)  4. Patient will increase R ankle df to 3+/5 to demonstrate improved strength & decreased gait deviations (progressing)    Plan   Increase  Resistance as tolerated    Plan of care Certification: 11/20/2019 to 2/12/2020.     Outpatient aquatic Physical Therapy 1-2 times weekly for  12 weeks to include the following interventions: manual therapy, aquatic therapy, patient education, therapeutic exercise, therapeutic activities  Patient may be seen by PTA as part of rehabilitation team    Antonia Ca, PT

## 2020-02-07 ENCOUNTER — CLINICAL SUPPORT (OUTPATIENT)
Dept: REHABILITATION | Facility: HOSPITAL | Age: 70
End: 2020-02-07
Attending: NEUROLOGICAL SURGERY
Payer: COMMERCIAL

## 2020-02-07 DIAGNOSIS — R29.898 WEAKNESS OF BOTH LOWER EXTREMITIES: ICD-10-CM

## 2020-02-07 PROCEDURE — 97113 AQUATIC THERAPY/EXERCISES: CPT

## 2020-02-07 NOTE — PROGRESS NOTES
Physical Therapy Daily Treatment Note     Name: Raffy Rutherford Jr.  Clinic Number: [id number]    Therapy Diagnosis:   Encounter Diagnosis   Name Primary?    Weakness of both lower extremities      Physician: Jason Caldwell MD    Visit Date: 2/7/2020      Physician Orders: PT Eval and Treat pool therapy  Medical Diagnosis from Referral:Z98.890 (ICD-10-CM) - S/P lumbar microdiscectomy  Evaluation Date: 11/20/2019  Authorization Period Expiration: 12/31/2020  Plan of Care Expiration: 2/12/2020  Visit # / Visits authorized: 10/20  Total Visits: 22    Time In: 0900  Time Out: 1000  Total Billable Time:15 minutes    Precautions: fibromyalgia    PROCEDURES/IMAGING: 10/25/2019: R MIS L5-s1 microdisectomy  Subjective     Pt reports: The power is out @ my house, so I was cold & I'm stiff  He was compliant with home exercise program.  Response to previous treatment: muscle soreness x 1 day  Functional change: intermittently using cane    Pain: 6/10   Location: right back  & RLE    Objective     Raffy received aquatic therapeutic exercises to develop strength, endurance, ROM, flexibility, posture and core stabilization for 60 minutes including:    WARMUP x 2 laps each  Walk fwd/back/side    STRETCHES 2 x 20sec  Hamstring  Quad  Gastroc    LE EX x 20  Mini squat-np  Heel raise with GS-np  Hip abd/add-np  Hip flex/ext-np  LAQ with hip flex-np  HS curls-np  Step up/over forward Box x 12(no UE support)  Step up/over lateral  Box x 12 ( no UE support)  Squat walk lateral blue Tband X 2 laps  Monster walks  blue Tband x 2 laps  Wobble board anterior/posterior x 20  Wobble board anterior posterior single LE x 15 each LE  Wobble board lateral  X 20   Wobble board single leg lateral x 15 each LE  Wobble board clockwise x 20  Wobble board counterclockwise x 20   Dive ring retrieval using L foot dorsiflexion & eversion for 4 reps x 3 sets  Leg press  Max resistancenoodle/tubing x 20 each LE  Hip extension max resistance noodle/tubing x  20 each LE    UE/CORE EX x 20  Rows blue tubing - 2 sec hold- modified tandem stance  Shoulder extension blue tubing- modified tandem stance  Chest press blue tubing- modified tandem stance  Truncal rotation double strand blue tubing  Mini squat hold with chest press small red kick board- posterior support pool wall  Shoulder extension small red kickboard  Tennis racket to hit soft ball with weight shifting  x 30-np  DKTC to hip extension // bars x 10 -np    BALANCE  Tandem gait forward // bars x 2 laps-np  Tandem gait backward // bars x 2 laps-np  Tandem gait forward/ back standing on small diameter noodle x 10  Braiding x 2 laps-np  Heel walking // bars x 1 lap  Toe walking // bars x 1 lap  Gait // bars emphasis on heel strike & toe off x 2 laps-np    ENDURANCE  Bicycle // bars x  6 min    COOL DOWN x 1 lap each  Walk fwd/back/side      Home Exercises Provided and Patient Education Provided     Education provided:   Role of aquatic therapy  Hydration post therapy      Written Home Exercises Provided: yes.  Exercises were reviewed and Raffy was able to demonstrate them prior to the end of the session.  Raffy demonstrated good  understanding of the education provided.     See EMR under Patient Instructions for exercises provided 11/27/2019.; 12/3/2019(dead bug)    Assessment     Patient had slight increased R hip pain with increased resistive exercises but pain was tolerable. Patient has increased postural control as no longer needs UE support for step ups on box.      Raffy is progressing well towards his goals.   Pt prognosis is Fair.     Pt will continue to benefit from skilled aquatic outpatient physical therapy to address the deficits listed in the problem list box on initial evaluation, provide pt/family education and to maximize pt's level of independence in the home and community environment.     Pt's spiritual, cultural and educational needs considered and pt agreeable to plan of care and  goals.    Anticipated barriers to physical therapy: chronic pain    Goals:   Short Term Goals: 6 weeks   1. Patient will be independent in HEP & progressions (progressing)  2. Patient will achieve TUG score of 14 sec or less to demonstrate improved mobility (progressing)     Long Term Goals: 12 weeks   1. Patient will have FOTO limitation score of 55% to demonstrate improved overall function & decreased pain (progressing)  2. Patient will achieve FTSTS score of 20 sec & be able to perform all 5 sit/stand (progressing)  3. Patient will increase R hip/knee MMT to 4/5 to demonstrate improved strength (progressing)  4. Patient will increase R ankle df to 3+/5 to demonstrate improved strength & decreased gait deviations (progressing)    Plan   Progress with more balance exercises as tolerated    Plan of care Certification: 11/20/2019 to 2/12/2020.     Outpatient aquatic Physical Therapy 1-2 times weekly for  12 weeks to include the following interventions: manual therapy, aquatic therapy, patient education, therapeutic exercise, therapeutic activities  Patient may be seen by PTA as part of rehabilitation team    Antonia Ca, PT

## 2020-02-10 ENCOUNTER — CLINICAL SUPPORT (OUTPATIENT)
Dept: REHABILITATION | Facility: HOSPITAL | Age: 70
End: 2020-02-10
Attending: NEUROLOGICAL SURGERY
Payer: COMMERCIAL

## 2020-02-10 DIAGNOSIS — R29.898 WEAKNESS OF BOTH LOWER EXTREMITIES: ICD-10-CM

## 2020-02-10 PROCEDURE — 97113 AQUATIC THERAPY/EXERCISES: CPT

## 2020-02-10 NOTE — PROGRESS NOTES
Physical Therapy Daily Treatment Note     Name: Raffy Rutherford Jr.  Clinic Number: [id number]    Therapy Diagnosis:   Encounter Diagnosis   Name Primary?    Weakness of both lower extremities      Physician: Jason Caldwell MD    Visit Date: 2/10/2020      Physician Orders: PT Eval and Treat pool therapy  Medical Diagnosis from Referral:Z98.890 (ICD-10-CM) - S/P lumbar microdiscectomy  Evaluation Date: 11/20/2019; reassess 2/10/2020  Authorization Period Expiration: 12/31/2020  Plan of Care Expiration: 5/4/2020  Visit # / Visits authorized: 11/20  Total Visits: 23    Time In: 0900  Time Out: 1000  Total Billable Time:45 minutes    Precautions: fibromyalgia    PROCEDURES/IMAGING: 10/25/2019: R MIS L5-s1 microdisectomy  Subjective     Pt reports: I'm trying not to use the cane  He was compliant with home exercise program.  Response to previous treatment: muscle soreness x 1 day  Functional change: intermittently using cane    Pain: 6/10   Location: right back  & RLE    Objective     Raffy received aquatic therapeutic exercises to develop strength, endurance, ROM, flexibility, posture and core stabilization for 60 minutes including:    WARMUP x 2 laps each  Walk fwd/back/side    STRETCHES 2 x 20sec  Hamstring  Quad  Gastroc    LE EX x 20  Mini squat-np  Heel raise with GS-np  Hip abd/add-np  Hip flex/ext-np  LAQ with hip flex-np  HS curls-np  Step up/over forward Box x 12(no UE support)  Step up/over lateral  Box x 12 ( no UE support)  Squat walk lateral blue Tband X 2 laps  Monster walks  blue Tband x 2 laps  Wobble board anterior/posterior x 20  Wobble board anterior posterior single LE x 15 each LE  Wobble board lateral  X 20   Wobble board single leg lateral x 15 each LE  Wobble board clockwise x 20  Wobble board counterclockwise x 20   Dive ring retrieval using L foot dorsiflexion & eversion for 4 reps x 3 sets  Leg press  Max resistancenoodle/tubing x 20 each LE  Hip extension max resistance noodle/tubing x 20  each LE    UE/CORE EX x 20  Rows blue tubing - 2 sec hold- modified tandem stance  Shoulder extension blue tubing- modified tandem stance  Chest press blue tubing- modified tandem stance  Truncal rotation double strand blue tubing  Mini squat hold with chest press small red kick board- posterior support pool wall  Shoulder extension small red kickboard  Tennis racket to hit soft ball with weight shifting  x 30-np  DKTC to hip extension // bars x 10 -np    BALANCE  Tandem gait forward // bars x 2 laps-np  Tandem gait backward // bars x 2 laps-np  Tandem gait forward/ back standing on small diameter noodle x 10  Braiding x 2 laps-np  Heel walking // bars x 1 lap-np  Toe walking // bars x 1 lap-np  Gait // bars emphasis on heel strike & toe off x 2 laps-np    ENDURANCE  Bicycle // bars x  6 min    COOL DOWN x 1 lap each  Walk fwd/back/side    Reassessment 2/10/2020  4-/5 R hip/knee  2-/5 R ankle pf/df  TUG 16 sec & B hands on chair  FTSTS: 21 sec with B hands on chair  Gait: lateral sway to R with slapping feet R>L    Home Exercises Provided and Patient Education Provided     Education provided:   Role of aquatic therapy  Hydration post therapy      Written Home Exercises Provided: yes.  Exercises were reviewed and Raffy was able to demonstrate them prior to the end of the session.  Raffy demonstrated good  understanding of the education provided.     See EMR under Patient Instructions for exercises provided 11/27/2019.; 12/3/2019(dead bug)    Assessment     Patient's reassessment completed & goals modified. FOTO limitation score 56%.. Patient able to tolerate exercises program with no increase in back or leg pain. Continue with POC       Raffy is progressing well towards his goals.   Pt prognosis is Fair.     Pt will continue to benefit from skilled aquatic outpatient physical therapy to address the deficits listed in the problem list box on initial evaluation, provide pt/family education and to maximize pt's level  of independence in the home and community environment.     Pt's spiritual, cultural and educational needs considered and pt agreeable to plan of care and goals.    Anticipated barriers to physical therapy: chronic pain    Goals:   Short Term Goals: 6 weeks   1. Patient will be independent in HEP & progressions (progressing)  2. Patient will achieve TUG score of 14 sec or less with single UE support to demonstrate improved mobility (progressing)     Long Term Goals: 12 weeks   1. Patient will have FOTO limitation score of 55% to demonstrate improved overall function & decreased pain (progressing)  2. Patient will achieve FTSTS score of 20 sec or less with single UE support (progressing)  3. Patient will increase R hip/knee MMT to 4+/5 to demonstrate improved strength (progressing)  4. Patient will increase R ankle df to 3+/5 to demonstrate improved strength & decreased gait deviations (progressing)    Plan   Increase resistance for monster walks as tolerated      Antonia Ca, PT

## 2020-02-10 NOTE — PLAN OF CARE
Outpatient Therapy Updated Plan of Care     Visit Date: 2/10/2020  Name: Raffy Rutherford Jr.  Clinic Number: [id number]    Therapy Diagnosis:   Encounter Diagnosis   Name Primary?    Weakness of both lower extremities      Physician: Jason Caldwell MD    Physician Orders: Eval & treat aquatic therapy  Medical Diagnosis: S/P lumbar microdisectomy  Evaluation Date: 11/20/2020    Total Visits Received: 23  Cancelled Visits: 4  No Show Visits: none    Current Certification Period:  11/20/2019 to 2/12/2020  Precautions:  spinal  Visits from Evaluation Date:  23  Functional Level Prior to Evaluation:  I with mobility using cane    Subjective     Update: I'm trying not to use my cane    Objective     Update: 4-/5 R hip/knee  2-/5 R ankle pf/df  TUG 16 sec & B hands on chair  FTSTS: 21 sec with B hands on chair  Gait: lateral sway to R with slapping feet R>L    Assessment     Update: Improvement with balance & gait stability as no longer requiring use of cane daily. Patient still with R ankle df/pf weakness causing gait deviations. Improvement with R hip/knee strength    Previous Short Term Goals Status:   Modified   New Short Term Goals Status:   NA  Long Term Goal Status:   modified:  Reasons for Recertification of Therapy:   Focus on ankle strengthening & core stabilization    Plan     Updated Certification Period: 2/10/2020 to 5/4/2020  Recommended Treatment Plan: 1-2 times per week for 12 weeks: Aquatic Therapy, Patient Education and Therapeutic Exercise  Other Recommendations: none    Antonia Ca, PT  2/10/2020

## 2020-02-12 ENCOUNTER — CLINICAL SUPPORT (OUTPATIENT)
Dept: REHABILITATION | Facility: HOSPITAL | Age: 70
End: 2020-02-12
Attending: NEUROLOGICAL SURGERY
Payer: COMMERCIAL

## 2020-02-12 DIAGNOSIS — R29.898 WEAKNESS OF BOTH LOWER EXTREMITIES: ICD-10-CM

## 2020-02-12 PROCEDURE — 97113 AQUATIC THERAPY/EXERCISES: CPT

## 2020-02-12 NOTE — PROGRESS NOTES
Physical Therapy Daily Treatment Note     Name: Raffy Rutherford Jr.  Clinic Number: [id number]    Therapy Diagnosis:   Encounter Diagnosis   Name Primary?    Weakness of both lower extremities      Physician: Jason Caldwell MD    Visit Date: 2/12/2020      Physician Orders: PT Eval and Treat pool therapy  Medical Diagnosis from Referral:Z98.890 (ICD-10-CM) - S/P lumbar microdiscectomy  Evaluation Date: 11/20/2019; reassess 2/10/2020  Authorization Period Expiration: 12/31/2020  Plan of Care Expiration: 5/4/2020  Visit # / Visits authorized: 12/20  Total Visits: 24    Time In: 1110  Time Out: 1210  Total Billable Time:60 minutes    Precautions: fibromyalgia    PROCEDURES/IMAGING: 10/25/2019: R MIS L5-s1 microdisectomy  Subjective     Pt reports: I had a near fall this morning- I caught myself on the chair @ my office  He was compliant with home exercise program.  Response to previous treatment: muscle soreness x 1 day  Functional change: intermittently using cane    Pain: 3/10   Location: right back  & RLE    Objective     Raffy received aquatic therapeutic exercises to develop strength, endurance, ROM, flexibility, posture and core stabilization for 60 minutes including:    WARMUP x 2 laps each  Walk fwd/back/side    STRETCHES 2 x 20sec  Hamstring  Quad  Gastroc    LE EX x 20  Mini squat-np  Heel raise with GS-np  Hip abd/add-np  Hip flex/ext-np  LAQ with hip flex-np  HS curls-np  Step up/over forward Box x 12(no UE support)  Step up/over lateral  Box x 12 ( no UE support)  Squat walk lateral purple Tband X 2 laps  Monster walks  purple Tband x 2 laps  Wobble board anterior/posterior x 20  Wobble board anterior posterior single LE x 15 each LE  Wobble board lateral  X 20   Wobble board single leg lateral x 15 each LE  Wobble board clockwise x 20  Wobble board counterclockwise x 20   Dive ring retrieval using L foot dorsiflexion & eversion for 4 reps x 3 sets  Leg press  Max resistance noodle/tubing x 20 each  LE  Hip extension max resistance noodle/tubing x 20 each LE    UE/CORE EX x 20  Rows blue tubing - 2 sec hold- modified tandem stance  Shoulder extension blue tubing- modified tandem stance  Chest press blue tubing- modified tandem stance  Truncal rotation double strand blue tubing  Mini squat hold with chest press small red kick board- posterior support pool wall  Shoulder extension small red kickboard  Tennis racket to hit soft ball with weight shifting  x 30  DKTC to hip extension // bars x 10 -np    BALANCE  Tandem gait forward // bars x 2 laps-np  Tandem gait backward // bars x 2 laps-np  Tandem gait forward/ back standing on small diameter noodle x 10  Braiding x 2 laps-np  Heel walking // bars x 1 lap  Toe walking // bars x 1 lap  Gait // bars emphasis on heel strike & toe off x 2 laps-np      ENDURANCE  Bicycle // bars x  6 min    COOL DOWN x 1 lap each  Walk fwd/back/side    Home Exercises Provided and Patient Education Provided     Education provided:   Role of aquatic therapy  Hydration post therapy      Written Home Exercises Provided: yes.  Exercises were reviewed and Raffy was able to demonstrate them prior to the end of the session.  Raffy demonstrated good  understanding of the education provided.     See EMR under Patient Instructions for exercises provided 11/27/2019.; 12/3/2019(dead bug)    Assessment     Patient presents after near fall today @ his office. No tenderness in lumbar spine or R shoulders. Patientable to tolerate increased resistance for squat & monster walks &  no increase in pain. Increased postural control while in water as no longer requires UE support for step ups on Box. Improvement with weight shifting when using tennis racket with no increase in pain. Continue with POC        Raffy is progressing well towards his goals.   Pt prognosis is Fair.     Pt will continue to benefit from skilled aquatic outpatient physical therapy to address the deficits listed in the problem list  box on initial evaluation, provide pt/family education and to maximize pt's level of independence in the home and community environment.     Pt's spiritual, cultural and educational needs considered and pt agreeable to plan of care and goals.    Anticipated barriers to physical therapy: chronic pain    Goals:   Short Term Goals: 6 weeks   1. Patient will be independent in HEP & progressions (progressing)  2. Patient will achieve TUG score of 14 sec or less with single UE support to demonstrate improved mobility (progressing)     Long Term Goals: 12 weeks   1. Patient will have FOTO limitation score of 55% to demonstrate improved overall function & decreased pain (progressing)  2. Patient will achieve FTSTS score of 20 sec or less with single UE support (progressing)  3. Patient will increase R hip/knee MMT to 4+/5 to demonstrate improved strength (progressing)  4. Patient will increase R ankle df to 3+/5 to demonstrate improved strength & decreased gait deviations (progressing)    Plan   Increase resistance for monster walks as tolerated      Antonia Ca, PT

## 2020-02-17 ENCOUNTER — CLINICAL SUPPORT (OUTPATIENT)
Dept: REHABILITATION | Facility: HOSPITAL | Age: 70
End: 2020-02-17
Attending: NEUROLOGICAL SURGERY
Payer: COMMERCIAL

## 2020-02-17 DIAGNOSIS — R29.898 WEAKNESS OF BOTH LOWER EXTREMITIES: ICD-10-CM

## 2020-02-17 PROCEDURE — 97113 AQUATIC THERAPY/EXERCISES: CPT

## 2020-02-17 NOTE — PROGRESS NOTES
Physical Therapy Daily Treatment Note     Name: Raffy Rutherford Jr.  Clinic Number: [id number]    Therapy Diagnosis:   Encounter Diagnosis   Name Primary?    Weakness of both lower extremities      Physician: Jason Caldewll MD    Visit Date: 2/17/2020      Physician Orders: PT Eval and Treat pool therapy  Medical Diagnosis from Referral:Z98.890 (ICD-10-CM) - S/P lumbar microdiscectomy  Evaluation Date: 11/20/2019; reassess 2/10/2020  Authorization Period Expiration: 12/31/2020  Plan of Care Expiration: 5/4/2020  Visit # / Visits authorized: 13/20  Total Visits: 25    Time In: 0900  Time Out: 1000  Total Billable Time:45 minutes    Precautions: fibromyalgia    PROCEDURES/IMAGING: 10/25/2019: R MIS L5-s1 microdisectomy  Subjective     [facility] & I did OK  He was compliant with home exercise program.  Response to previous treatment: muscle soreness x 1 day  Functional change: intermittently using cane    Pain: 3/10   Location: right back  & RLE    Objective     Raffy received aquatic therapeutic exercises to develop strength, endurance, ROM, flexibility, posture and core stabilization for 60 minutes including:    WARMUP x 2 laps each  Walk fwd/back/side    STRETCHES 2 x 20sec  Hamstring  Quad  Gastroc    LE EX x 20  Mini squat-np  Heel raise with GS-np  Hip abd/add-np  Hip flex/ext-np  LAQ with hip flex-np  HS curls-np  Step up/over forward Box x 12(no UE support)  Step up/over lateral  Box x 12 ( no UE support)  Squat walk lateral purple Tband X 2 laps  Monster walks  purple Tband x 2 laps  Wobble board anterior/posterior x 20  Wobble board anterior posterior single LE x 15 each LE  Wobble board lateral  X 20   Wobble board single leg lateral x 15 each LE  Wobble board clockwise x 20  Wobble board counterclockwise x 20   Dive ring retrieval using L foot dorsiflexion & eversion for 4 reps x 3 sets  Leg press  Max resistance noodle/tubing x 20 each LE  Hip  extension max resistance noodle/tubing x 20 each LE    UE/CORE EX x 20  Rows blue tubing - 2 sec hold- modified tandem stance  Shoulder extension blue tubing- modified tandem stance  Chest press blue tubing- modified tandem stance  Truncal rotation double strand blue tubing  Mini squat hold with chest press small red kick board- posterior support pool wall  Shoulder extension small red kickboard  Tennis racket to hit soft ball with weight shifting  x 30  DKTC to hip extension // bars x 10 -np    BALANCE  Tandem gait forward // bars x 2 laps-np  Tandem gait backward // bars x 2 laps-np  Tandem gait forward/ back standing on small diameter noodle x 10  Braiding x 2 laps-np  Heel walking // bars x 1 lap  Toe walking // bars x 1 lap  Gait // bars emphasis on heel strike & toe off x 2 laps-np      ENDURANCE  Bicycle // bars x  6 min    COOL DOWN x 1 lap each  Walk fwd/back/side    Home Exercises Provided and Patient Education Provided     Education provided:   Role of aquatic therapy  Hydration post therapy      Written Home Exercises Provided: yes.  Exercises were reviewed and Raffy was able to demonstrate them prior to the end of the session.  Raffy demonstrated good  understanding of the education provided.     See EMR under Patient Instructions for exercises provided 11/27/2019.; 12/3/2019(dead bug)    Assessment     Patient progressing with aquatic therapy as able to perform tandem gait intermittently with no UE support. Patient able to tolerate exercises with no increase in pain . Continue with POC        Raffy is progressing well towards his goals.   Pt prognosis is Fair.     Pt will continue to benefit from skilled aquatic outpatient physical therapy to address the deficits listed in the problem list box on initial evaluation, provide pt/family education and to maximize pt's level of independence in the home and community environment.     Pt's spiritual, cultural and educational needs considered and pt  agreeable to plan of care and goals.    Anticipated barriers to physical therapy: chronic pain    Goals:   Short Term Goals: 6 weeks   1. Patient will be independent in HEP & progressions (progressing)  2. Patient will achieve TUG score of 14 sec or less with single UE support to demonstrate improved mobility (progressing)     Long Term Goals: 12 weeks   1. Patient will have FOTO limitation score of 55% to demonstrate improved overall function & decreased pain (progressing)  2. Patient will achieve FTSTS score of 20 sec or less with single UE support (progressing)  3. Patient will increase R hip/knee MMT to 4+/5 to demonstrate improved strength (progressing)  4. Patient will increase R ankle df to 3+/5 to demonstrate improved strength & decreased gait deviations (progressing)    Plan   Increase resistance for monster walks as tolerated      Antonia Ca, PT

## 2020-02-19 ENCOUNTER — PATIENT MESSAGE (OUTPATIENT)
Dept: REHABILITATION | Facility: HOSPITAL | Age: 70
End: 2020-02-19

## 2020-02-20 ENCOUNTER — TELEPHONE (OUTPATIENT)
Dept: PRIMARY CARE CLINIC | Facility: CLINIC | Age: 70
End: 2020-02-20

## 2020-02-24 ENCOUNTER — CLINICAL SUPPORT (OUTPATIENT)
Dept: REHABILITATION | Facility: HOSPITAL | Age: 70
End: 2020-02-24
Attending: NEUROLOGICAL SURGERY
Payer: COMMERCIAL

## 2020-02-24 DIAGNOSIS — R29.898 WEAKNESS OF BOTH LOWER EXTREMITIES: ICD-10-CM

## 2020-02-24 PROCEDURE — 97113 AQUATIC THERAPY/EXERCISES: CPT

## 2020-02-24 NOTE — PROGRESS NOTES
Physical Therapy Daily Treatment Note     Name: Raffy Rutherford Jr.  Clinic Number: [id number]    Therapy Diagnosis:   Encounter Diagnosis   Name Primary?    Weakness of both lower extremities      Physician: Jason Caldwell MD    Visit Date: 2/24/2020      Physician Orders: PT Eval and Treat pool therapy  Medical Diagnosis from Referral:Z98.890 (ICD-10-CM) - S/P lumbar microdiscectomy  Evaluation Date: 11/20/2019; reassess 2/10/2020  Authorization Period Expiration: 12/31/2020  Plan of Care Expiration: 5/4/2020  Visit # / Visits authorized: 14/20  Total Visits: 26    Time In: 1100  Time Out: 1000  Total Billable Time:30 minutes    Precautions: fibromyalgia    PROCEDURES/IMAGING: 10/25/2019: R MIS L5-s1 microdisectomy  Subjective     Pt reports: My allergies are acting up  He was compliant with home exercise program.  Response to previous treatment: muscle soreness x 1 day  Functional change: intermittently using cane    Pain: 7/10   Location: right back  & RLE    Objective     Raffy received aquatic therapeutic exercises to develop strength, endurance, ROM, flexibility, posture and core stabilization for 60 minutes including:    WARMUP x 2 laps each  Walk fwd/back/side    STRETCHES 2 x 20sec  Hamstring  Quad  Gastroc    LE EX x 20  Mini squat-np  Heel raise with GS-np  Hip abd/add-np  Hip flex/ext-np  LAQ with hip flex-np  HS curls-np  Step up/over forward Box x 12(no UE support)-np  Step up/over lateral  Box x 12 ( no UE support)-np  Squat walk lateral purple Tband X 2 laps  Monster walks  purple Tband x 2 laps  Wobble board anterior/posterior x 20  Wobble board anterior posterior single LE x 15 each LE  Wobble board lateral  X 20   Wobble board single leg lateral x 15 each LE  Wobble board clockwise x 20  Wobble board counterclockwise x 20   Dive ring retrieval using L foot dorsiflexion & eversion for 4 reps x 3 sets  Leg press  Max resistance noodle/tubing x 20 each LE  Hip extension max resistance noodle/tubing  x 20 each LE  Hopping forward/back x 1 step for 10 reps- BUE support of DB  Hopping lateral x 1 step for 10 reps- BUE support of DB    UE/CORE EX x 20  Rows blue tubing - 2 sec hold- modified tandem stance  Shoulder extension blue tubing- modified tandem stance  Chest press blue tubing- modified tandem stance  Truncal rotation double strand blue tubing  Mini squat hold with chest press small red kick board- posterior support pool wall  Shoulder extension small red kickboard  Tennis racket to hit soft ball with weight shifting  x 30-np  DKTC to hip extension // bars x 10 -np    BALANCE  Tandem gait forward // bars x 2 laps-np  Tandem gait backward // bars x 2 laps-np  Tandem gait forward/ back standing on small diameter noodle x 10  Braiding x 2 laps-np  Heel walking // bars x 1 lap  Toe walking // bars x 1 lap  Gait // bars emphasis on heel strike & toe off x 2 laps-np      ENDURANCE  Bicycle // bars x  6 min    COOL DOWN x 1 lap each  Walk fwd/back/side    Home Exercises Provided and Patient Education Provided     Education provided:   Role of aquatic therapy  Hydration post therapy      Written Home Exercises Provided: yes.  Exercises were reviewed and Raffy was able to demonstrate them prior to the end of the session.  Raffy demonstrated good  understanding of the education provided.     See EMR under Patient Instructions for exercises provided 11/27/2019.; 12/3/2019(dead bug)    Assessment     Patient progressing with aquatic therapy as able to perform hopping with BUE support with minimal LOB. Patient requires intermittent verbal instruction for Tubing exercises to maintain upright/midline posture. Patient compliant with HEP        Raffy is progressing well towards his goals.   Pt prognosis is Fair.     Pt will continue to benefit from skilled aquatic outpatient physical therapy to address the deficits listed in the problem list box on initial evaluation, provide pt/family education and to maximize pt's  level of independence in the home and community environment.     Pt's spiritual, cultural and educational needs considered and pt agreeable to plan of care and goals.    Anticipated barriers to physical therapy: chronic pain    Goals:   Short Term Goals: 6 weeks   1. Patient will be independent in HEP & progressions (progressing)  2. Patient will achieve TUG score of 14 sec or less with single UE support to demonstrate improved mobility (progressing)     Long Term Goals: 12 weeks   1. Patient will have FOTO limitation score of 55% to demonstrate improved overall function & decreased pain (progressing)  2. Patient will achieve FTSTS score of 20 sec or less with single UE support (progressing)  3. Patient will increase R hip/knee MMT to 4+/5 to demonstrate improved strength (progressing)  4. Patient will increase R ankle df to 3+/5 to demonstrate improved strength & decreased gait deviations (progressing)    Plan   Increase resistance for monster walks as tolerated      Antonia Ca, PT

## 2020-02-26 ENCOUNTER — PATIENT MESSAGE (OUTPATIENT)
Dept: PRIMARY CARE CLINIC | Facility: CLINIC | Age: 70
End: 2020-02-26

## 2020-02-26 ENCOUNTER — CLINICAL SUPPORT (OUTPATIENT)
Dept: REHABILITATION | Facility: HOSPITAL | Age: 70
End: 2020-02-26
Attending: NEUROLOGICAL SURGERY
Payer: COMMERCIAL

## 2020-02-26 ENCOUNTER — TELEPHONE (OUTPATIENT)
Dept: PRIMARY CARE CLINIC | Facility: CLINIC | Age: 70
End: 2020-02-26

## 2020-02-26 DIAGNOSIS — R29.898 WEAKNESS OF BOTH LOWER EXTREMITIES: ICD-10-CM

## 2020-02-26 PROCEDURE — 97113 AQUATIC THERAPY/EXERCISES: CPT

## 2020-02-26 NOTE — PROGRESS NOTES
Physical Therapy Daily Treatment Note     Name: Raffy Rutherford Jr.  Clinic Number: [id number]    Therapy Diagnosis:   Encounter Diagnosis   Name Primary?    Weakness of both lower extremities      Physician: Jason Caldwell MD    Visit Date: 2/26/2020      Physician Orders: PT Eval and Treat pool therapy  Medical Diagnosis from Referral:Z98.890 (ICD-10-CM) - S/P lumbar microdiscectomy  Evaluation Date: 11/20/2019; reassess 2/10/2020  Authorization Period Expiration: 12/31/2020  Plan of Care Expiration: 5/4/2020  Visit # / Visits authorized: 15/20  Total Visits: 27    Time In: 1100  Time Out: 1200  Total Billable Time:15 minutes    Precautions: fibromyalgia    PROCEDURES/IMAGING: 10/25/2019: R MIS L5-s1 microdisectomy  Subjective     Pt reports: I did not have any increased soreness  He was compliant with home exercise program.  Response to previous treatment: no muscle soreness  Functional change: not using cane    Pain: 2/10  Back, 5/10 RLE  Location: right back  & RLE    Objective     Raffy received aquatic therapeutic exercises to develop strength, endurance, ROM, flexibility, posture and core stabilization for 60 minutes including:    WARMUP x 2 laps each  Walk fwd/back/side    STRETCHES 2 x 20sec  Hamstring  Quad  Gastroc    LE EX x 20  Mini squat-np  Heel raise with GS-np  Hip abd/add-np  Hip flex/ext-np  LAQ with hip flex-np  HS curls-np  Step up/over forward Box x 12(no UE support)  Step up/over lateral  Box x 12 ( no UE support)  Step down off box for eccentric quad x 10 each LE  Squat walk lateral purple Tband X 2 laps  Monster walks  purple Tband x 2 laps  Wobble board anterior/posterior x 20  Wobble board anterior posterior single LE x 15 each LE  Wobble board lateral  X 20   Wobble board single leg lateral x 15 each LE  Wobble board clockwise x 20  Wobble board counterclockwise x 20   Dive ring retrieval using L foot dorsiflexion & eversion for 4 reps x 3 sets  Leg press  Max resistance noodle/tubing x  20 each LE  Hip extension max resistance noodle/tubing x 20 each LE  Hopping forward/back x 1 step for 10 reps- BUE support of DB  Hopping lateral x 1 step for 10 reps- BUE support of DB    UE/CORE EX x 20  Rows blue tubing - 2 sec hold- modified tandem stance  Shoulder extension blue tubing- modified tandem stance  Chest press blue tubing- modified tandem stance  Truncal rotation double strand blue tubing  Mini squat hold with chest press small red kick board- posterior support pool wall  Shoulder extension small red kickboard  Tennis racket to hit soft ball with weight shifting  x 30-np  DKTC to hip extension // bars x 10 -np    BALANCE  Tandem gait forward // bars x 2 laps-np  Tandem gait backward // bars x 2 laps-np  Tandem gait forward/ back standing on small diameter noodle x 10  Braiding x 2 laps-np  Heel walking // bars x 1 lap  Toe walking // bars x 1 lap  Gait // bars emphasis on heel strike & toe off x 2 laps-np      ENDURANCE  Bicycle // bars x  6 min    COOL DOWN x 1 lap each  Walk fwd/back/side    Home Exercises Provided and Patient Education Provided     Education provided:   Role of aquatic therapy  Hydration post therapy      Written Home Exercises Provided: yes.  Exercises were reviewed and Raffy was able to demonstrate them prior to the end of the session.  Raffy demonstrated good  understanding of the education provided.     See EMR under Patient Instructions for exercises provided 11/27/2019.; 12/3/2019(dead bug)    Assessment     Patient progressing with aquatic therapy with addition of hopping forward & lateral with BUE Dumbbell support & increased LE muscular activation to complete exercise. Steady progress with balance & LE strength with minimal verbal instruction for proper technique & TA activation. Will assess for changes in pain & advance exercises as tolerated        Raffy is progressing well towards his goals.   Pt prognosis is Fair.     Pt will continue to benefit from skilled  aquatic outpatient physical therapy to address the deficits listed in the problem list box on initial evaluation, provide pt/family education and to maximize pt's level of independence in the home and community environment.     Pt's spiritual, cultural and educational needs considered and pt agreeable to plan of care and goals.    Anticipated barriers to physical therapy: chronic pain    Goals:   Short Term Goals: 6 weeks   1. Patient will be independent in HEP & progressions (progressing)  2. Patient will achieve TUG score of 14 sec or less with single UE support to demonstrate improved mobility (progressing)     Long Term Goals: 12 weeks   1. Patient will have FOTO limitation score of 55% to demonstrate improved overall function & decreased pain (progressing)  2. Patient will achieve FTSTS score of 20 sec or less with single UE support (progressing)  3. Patient will increase R hip/knee MMT to 4+/5 to demonstrate improved strength (progressing)  4. Patient will increase R ankle df to 3+/5 to demonstrate improved strength & decreased gait deviations (progressing)    Plan   Increase resistance for monster walks as tolerated      Antonia Ca, PT

## 2020-02-26 NOTE — TELEPHONE ENCOUNTER
Dear  Sangeeta,     We are interested in your health and wellness and have tried to reach you on several occasions.  Please call Brittny Jean at [phone] when you are able. We would like to check in to see how you are doing. We care about your health and hope to hear from you soon. If you are unsure about participating in counseling, we are here to answer your questions.   Sincerely,    Brittny Jean, W   [facility]   [phone]

## 2020-02-27 ENCOUNTER — TELEPHONE (OUTPATIENT)
Dept: PHARMACY | Facility: CLINIC | Age: 70
End: 2020-02-27

## 2020-02-28 ENCOUNTER — PATIENT OUTREACH (OUTPATIENT)
Dept: ADMINISTRATIVE | Facility: OTHER | Age: 70
End: 2020-02-28

## 2020-03-02 ENCOUNTER — CLINICAL SUPPORT (OUTPATIENT)
Dept: INFECTIOUS DISEASES | Facility: CLINIC | Age: 70
End: 2020-03-02
Payer: COMMERCIAL

## 2020-03-02 ENCOUNTER — CLINICAL SUPPORT (OUTPATIENT)
Dept: REHABILITATION | Facility: HOSPITAL | Age: 70
End: 2020-03-02
Attending: NEUROLOGICAL SURGERY
Payer: COMMERCIAL

## 2020-03-02 ENCOUNTER — OFFICE VISIT (OUTPATIENT)
Dept: NEUROLOGY | Facility: CLINIC | Age: 70
End: 2020-03-02
Payer: COMMERCIAL

## 2020-03-02 VITALS — WEIGHT: 174.81 LBS | BODY MASS INDEX: 26.49 KG/M2 | HEIGHT: 68 IN

## 2020-03-02 DIAGNOSIS — G43.709 CHRONIC MIGRAINE WITHOUT AURA WITHOUT STATUS MIGRAINOSUS, NOT INTRACTABLE: Primary | ICD-10-CM

## 2020-03-02 DIAGNOSIS — F32.A DEPRESSION, UNSPECIFIED DEPRESSION TYPE: ICD-10-CM

## 2020-03-02 DIAGNOSIS — G89.29 CHRONIC NECK PAIN: ICD-10-CM

## 2020-03-02 DIAGNOSIS — M54.2 CHRONIC NECK PAIN: ICD-10-CM

## 2020-03-02 DIAGNOSIS — R29.898 WEAKNESS OF BOTH LOWER EXTREMITIES: ICD-10-CM

## 2020-03-02 DIAGNOSIS — M51.16 LUMBAR DISC HERNIATION WITH RADICULOPATHY: ICD-10-CM

## 2020-03-02 PROCEDURE — 90471 PNEUMOCOCCAL POLYSACCHARIDE VACCINE 23-VALENT =>2YO SQ IM: ICD-10-PCS | Mod: S$GLB,,, | Performed by: INTERNAL MEDICINE

## 2020-03-02 PROCEDURE — 99999 PR PBB SHADOW E&M-EST. PATIENT-LVL III: ICD-10-PCS | Mod: PBBFAC,,, | Performed by: STUDENT IN AN ORGANIZED HEALTH CARE EDUCATION/TRAINING PROGRAM

## 2020-03-02 PROCEDURE — 97113 AQUATIC THERAPY/EXERCISES: CPT

## 2020-03-02 PROCEDURE — 1125F AMNT PAIN NOTED PAIN PRSNT: CPT | Mod: S$GLB,,, | Performed by: STUDENT IN AN ORGANIZED HEALTH CARE EDUCATION/TRAINING PROGRAM

## 2020-03-02 PROCEDURE — 90471 IMMUNIZATION ADMIN: CPT | Mod: S$GLB,,, | Performed by: INTERNAL MEDICINE

## 2020-03-02 PROCEDURE — 3288F PR FALLS RISK ASSESSMENT DOCUMENTED: ICD-10-PCS | Mod: CPTII,S$GLB,, | Performed by: STUDENT IN AN ORGANIZED HEALTH CARE EDUCATION/TRAINING PROGRAM

## 2020-03-02 PROCEDURE — 1100F PTFALLS ASSESS-DOCD GE2>/YR: CPT | Mod: CPTII,S$GLB,, | Performed by: STUDENT IN AN ORGANIZED HEALTH CARE EDUCATION/TRAINING PROGRAM

## 2020-03-02 PROCEDURE — 90732 PNEUMOCOCCAL POLYSACCHARIDE VACCINE 23-VALENT =>2YO SQ IM: ICD-10-PCS | Mod: S$GLB,,, | Performed by: INTERNAL MEDICINE

## 2020-03-02 PROCEDURE — 99999 PR PBB SHADOW E&M-EST. PATIENT-LVL III: CPT | Mod: PBBFAC,,, | Performed by: STUDENT IN AN ORGANIZED HEALTH CARE EDUCATION/TRAINING PROGRAM

## 2020-03-02 PROCEDURE — 99214 PR OFFICE/OUTPT VISIT, EST, LEVL IV, 30-39 MIN: ICD-10-PCS | Mod: S$GLB,,, | Performed by: STUDENT IN AN ORGANIZED HEALTH CARE EDUCATION/TRAINING PROGRAM

## 2020-03-02 PROCEDURE — 1159F PR MEDICATION LIST DOCUMENTED IN MEDICAL RECORD: ICD-10-PCS | Mod: S$GLB,,, | Performed by: STUDENT IN AN ORGANIZED HEALTH CARE EDUCATION/TRAINING PROGRAM

## 2020-03-02 PROCEDURE — 99214 OFFICE O/P EST MOD 30 MIN: CPT | Mod: S$GLB,,, | Performed by: STUDENT IN AN ORGANIZED HEALTH CARE EDUCATION/TRAINING PROGRAM

## 2020-03-02 PROCEDURE — 90732 PPSV23 VACC 2 YRS+ SUBQ/IM: CPT | Mod: S$GLB,,, | Performed by: INTERNAL MEDICINE

## 2020-03-02 PROCEDURE — 3288F FALL RISK ASSESSMENT DOCD: CPT | Mod: CPTII,S$GLB,, | Performed by: STUDENT IN AN ORGANIZED HEALTH CARE EDUCATION/TRAINING PROGRAM

## 2020-03-02 PROCEDURE — 1100F PR PT FALLS ASSESS DOC 2+ FALLS/FALL W/INJURY/YR: ICD-10-PCS | Mod: CPTII,S$GLB,, | Performed by: STUDENT IN AN ORGANIZED HEALTH CARE EDUCATION/TRAINING PROGRAM

## 2020-03-02 PROCEDURE — 1125F PR PAIN SEVERITY QUANTIFIED, PAIN PRESENT: ICD-10-PCS | Mod: S$GLB,,, | Performed by: STUDENT IN AN ORGANIZED HEALTH CARE EDUCATION/TRAINING PROGRAM

## 2020-03-02 PROCEDURE — 1159F MED LIST DOCD IN RCRD: CPT | Mod: S$GLB,,, | Performed by: STUDENT IN AN ORGANIZED HEALTH CARE EDUCATION/TRAINING PROGRAM

## 2020-03-02 RX ORDER — UBROGEPANT 50 MG/1
50 TABLET ORAL ONCE AS NEEDED
Qty: 10 TABLET | Refills: 0 | Status: SHIPPED | OUTPATIENT
Start: 2020-03-02 | End: 2020-03-02 | Stop reason: SDUPTHER

## 2020-03-02 RX ORDER — UBROGEPANT 50 MG/1
50 TABLET ORAL ONCE AS NEEDED
Qty: 10 TABLET | Refills: 0 | Status: SHIPPED | OUTPATIENT
Start: 2020-03-02 | End: 2020-03-02

## 2020-03-02 NOTE — PROGRESS NOTES
Physical Therapy Daily Treatment Note     Name: Raffy Rutherford Jr.  Clinic Number: [id number]    Therapy Diagnosis:   Encounter Diagnosis   Name Primary?    Weakness of both lower extremities      Physician: Jason Caldwell MD    Visit Date: 3/2/2020      Physician Orders: PT Eval and Treat pool therapy  Medical Diagnosis from Referral:Z98.890 (ICD-10-CM) - S/P lumbar microdiscectomy  Evaluation Date: 11/20/2019; reassess 2/10/2020  Authorization Period Expiration: 12/31/2020  Plan of Care Expiration: 5/4/2020  Visit # / Visits authorized: 16/20  Total Visits: 28    Time In: 0810  Time Out: 0930  Total Billable Time: 45 minutes    Precautions: fibromyalgia    PROCEDURES/IMAGING: 10/25/2019: R MIS L5-s1 microdisectomy  Subjective     Pt reports: My back was a little more sore this weekend  He was compliant with home exercise program.  Response to previous treatment: no muscle soreness  Functional change: not using cane    Pain: 5/10  Back, 5/10 RLE  Location: right back  & RLE    Objective     Raffy received aquatic therapeutic exercises to develop strength, endurance, ROM, flexibility, posture and core stabilization for 80 minutes including:    WARMUP x 2 laps each  Walk fwd/back/side    STRETCHES 2 x 20sec  Hamstring  Quad  Gastroc    LE EX x 20  Mini squat-np  Heel raise with GS-np  Hip abd/add-np  Hip flex/ext-np  LAQ with hip flex-np  HS curls-np  Step up/over forward Box x 12(no UE support)  Step up/over lateral  Box x 12 ( no UE support)  Step down off box for eccentric quad x 10 each LE  Squat walk lateral purple Tband X 2 laps  Monster walks  purple Tband x 2 laps  Walking lunges purple TBand x 2 laps  Wobble board anterior/posterior x 20-np  Wobble board anterior posterior single LE x 20 each LE  Wobble board lateral  X 20 -np  Wobble board single leg lateral x 20 each LE  Wobble board clockwise x 20 -np  Wobble board counterclockwise x 20 -np  Dive ring retrieval using L foot dorsiflexion & eversion for 4  reps x 3 sets  Leg press  Max resistance noodle/tubing x 20 each LE  Hip extension max resistance noodle/tubing x 20 each LE  Hopping forward/back x 1 step for 10 reps- BUE support of DB  Hopping lateral x 1 step for 10 reps- BUE support of DB  Prone to/from supine transitions using B UE DB support & ankle float wraps x 10    UE/CORE EX x 20  Rows blue tubing - 2 sec hold- modified tandem stance  Shoulder extension blue tubing- modified tandem stance  Chest press blue tubing- modified tandem stance  Truncal rotation double strand blue tubing  Mini squat hold with chest press small red kick board- posterior support pool wall  Shoulder extension small red kickboard  Tennis racket to hit soft ball with weight shifting  x 30-np  DKTC to hip extension // bars x 10 -np    BALANCE  Tandem gait forward // bars x 2 laps-np  Tandem gait backward // bars x 2 laps-np  Tandem gait forward/ back standing on small diameter noodle x 10  Braiding x 2 laps-np  Heel walking // bars x 1 lap  Toe walking // bars x 1 lap  Gait // bars emphasis on heel strike & toe off x 2 laps-np      ENDURANCE  Bicycle // bars x  6 min    COOL DOWN x 1 lap each  Walk fwd/back/side    Home Exercises Provided and Patient Education Provided     Education provided:   Role of aquatic therapy  Hydration post therapy      Written Home Exercises Provided: yes.  Exercises were reviewed and Raffy was able to demonstrate them prior to the end of the session.  Raffy demonstrated good  understanding of the education provided.     See EMR under Patient Instructions for exercises provided 11/27/2019.; 12/3/2019(dead bug)    Assessment     Patient progressing with aquatic therapy & able to tolerate addition of resistive lunges with no increase in pain. Patient  Able to tolerate addition of prone to/from supine transitions with only minimal c/o increase in low back pain indicative of improved core & paraspinal strength.        Raffy is progressing well towards his  goals.   Pt prognosis is Fair.     Pt will continue to benefit from skilled aquatic outpatient physical therapy to address the deficits listed in the problem list box on initial evaluation, provide pt/family education and to maximize pt's level of independence in the home and community environment.     Pt's spiritual, cultural and educational needs considered and pt agreeable to plan of care and goals.    Anticipated barriers to physical therapy: chronic pain    Goals:   Short Term Goals: 6 weeks   1. Patient will be independent in HEP & progressions (progressing)  2. Patient will achieve TUG score of 14 sec or less with single UE support to demonstrate improved mobility (progressing)     Long Term Goals: 12 weeks   1. Patient will have FOTO limitation score of 55% to demonstrate improved overall function & decreased pain (progressing)  2. Patient will achieve FTSTS score of 20 sec or less with single UE support (progressing)  3. Patient will increase R hip/knee MMT to 4+/5 to demonstrate improved strength (progressing)  4. Patient will increase R ankle df to 3+/5 to demonstrate improved strength & decreased gait deviations (progressing)    Plan   Increase resistance with balance challenges    Antonia Ca, PT

## 2020-03-04 ENCOUNTER — CLINICAL SUPPORT (OUTPATIENT)
Dept: REHABILITATION | Facility: HOSPITAL | Age: 70
End: 2020-03-04
Attending: NEUROLOGICAL SURGERY
Payer: COMMERCIAL

## 2020-03-04 DIAGNOSIS — R29.898 WEAKNESS OF BOTH LOWER EXTREMITIES: ICD-10-CM

## 2020-03-04 PROCEDURE — 97113 AQUATIC THERAPY/EXERCISES: CPT

## 2020-03-04 NOTE — PROGRESS NOTES
Physical Therapy Daily Treatment Note     Name: Raffy Rutherford Jr.  Clinic Number: [id number]    Therapy Diagnosis:   Encounter Diagnosis   Name Primary?    Weakness of both lower extremities      Physician: Jason Caldwell MD    Visit Date: 3/4/2020      Physician Orders: PT Eval and Treat pool therapy  Medical Diagnosis from Referral:Z98.890 (ICD-10-CM) - S/P lumbar microdiscectomy  Evaluation Date: 11/20/2019; reassess 2/10/2020  Authorization Period Expiration: 12/31/2020  Plan of Care Expiration: 5/4/2020  Visit # / Visits authorized: 16/20  Total Visits: 29    Time In: 1400  Time Out: 1500  Total Billable Time: 30 minutes    Precautions: fibromyalgia    PROCEDURES/IMAGING: 10/25/2019: R MIS L5-s1 microdisectomy  Subjective     Pt reports: My back was a little more sore this weekend  He was compliant with home exercise program.  Response to previous treatment: no muscle soreness  Functional change: not using cane    Pain: 5/10  Back, 5/10 RLE  Location: right back  & RLE    Objective     Raffy received aquatic therapeutic exercises to develop strength, endurance, ROM, flexibility, posture and core stabilization for 80 minutes including:    WARMUP x 2 laps each  Walk fwd/back/side    STRETCHES 2 x 20sec  Hamstring  Quad  Gastroc    LE EX x 20  Mini squat-np  Heel raise with GS-np  Hip abd/add-np  Hip flex/ext-np  LAQ with hip flex-np  HS curls-np  Step up/over forward Box x 12(no UE support)  Step up/over lateral  Box x 12 ( no UE support)  Step down off box for eccentric quad x 10 each LE  Squat walk lateral purple Tband X 2 laps  Monster walks  purple Tband x 2 laps  Walking lunges purple TBand x 2 laps  Wobble board anterior/posterior x 20-np  Wobble board anterior posterior single LE x 20 each LE  Wobble board lateral  X 20 -np  Wobble board single leg lateral x 20 each LE  Wobble board clockwise x 20 -np  Wobble board counterclockwise x 20 -np  Dive ring retrieval using L foot dorsiflexion & eversion for 4  reps x 3 sets  Leg press  Max resistance noodle/tubing x 20 each LE  Hip extension max resistance noodle/tubing x 20 each LE  Hopping forward/back x 1 step for 10 reps- BUE support of DB  Hopping lateral x 1 step for 10 reps- BUE support of DB  Prone to/from supine transitions using B UE DB support & ankle float wraps x 10    UE/CORE EX x 20  Rows blue tubing - 2 sec hold- modified tandem stance  Shoulder extension blue tubing- modified tandem stance  Chest press blue tubing- modified tandem stance  Truncal rotation double strand blue tubing  Mini squat hold with chest press small red kick board- posterior support pool wall  Shoulder extension small red kickboard  Tennis racket to hit soft ball with weight shifting  x 30-np  DKTC to hip extension // bars x 10 -np    BALANCE  Tandem gait forward // bars x 2 laps-np  Tandem gait backward // bars x 2 laps-np  Tandem gait forward/ back standing on small diameter noodle x 10  Braiding x 2 laps-np  Heel walking // bars x 1 lap  Toe walking // bars x 1 lap  Gait // bars emphasis on heel strike & toe off x 2 laps-np      ENDURANCE  Bicycle // bars x  6 min    COOL DOWN x 1 lap each  Walk fwd/back/side    Home Exercises Provided and Patient Education Provided     Education provided:   Role of aquatic therapy  Hydration post therapy      Written Home Exercises Provided: yes.  Exercises were reviewed and Raffy was able to demonstrate them prior to the end of the session.  Raffy demonstrated good  understanding of the education provided.     See EMR under Patient Instructions for exercises provided 11/27/2019.; 12/3/2019(dead bug)    Assessment     Patient progressing with aquatic therapy & able to tolerate exercise program with no increase in pain. Patient with good technique with supine to/from prone transitions. Good effort with all exercises    Raffy is progressing well towards his goals.   Pt prognosis is Fair.     Pt will continue to benefit from skilled aquatic  outpatient physical therapy to address the deficits listed in the problem list box on initial evaluation, provide pt/family education and to maximize pt's level of independence in the home and community environment.     Pt's spiritual, cultural and educational needs considered and pt agreeable to plan of care and goals.    Anticipated barriers to physical therapy: chronic pain    Goals:   Short Term Goals: 6 weeks   1. Patient will be independent in HEP & progressions (progressing)  2. Patient will achieve TUG score of 14 sec or less with single UE support to demonstrate improved mobility (progressing)     Long Term Goals: 12 weeks   1. Patient will have FOTO limitation score of 55% to demonstrate improved overall function & decreased pain (progressing)  2. Patient will achieve FTSTS score of 20 sec or less with single UE support (progressing)  3. Patient will increase R hip/knee MMT to 4+/5 to demonstrate improved strength (progressing)  4. Patient will increase R ankle df to 3+/5 to demonstrate improved strength & decreased gait deviations (progressing)    Plan     Increase resistance with balance challenges    Antonia Ca, PT

## 2020-03-05 NOTE — PROGRESS NOTES
Patient Name: Raffy Rutherford Jr.  MRN: [id number]    CC: Headaches     Interval history 3/2/20:   - Reports of persisting headaches - frequency / severity despite 3 month trial of Emgality  - Negative response to indomethacin trial / Reported of fiorinal use for extreme headaches n positive response to the same. Also reports of caution with regards to medication overuse / rebound headaches.   - Also reported of lessened headache frequency when prescribed narco for other medical reasons - inquires on the effectiveness of opioids on migraine management     HPI: Raffy Rutherford Jr. is a 69 y.o. male with medical history of depression on lamictal, selegiline / lumbar DDD s/p L3-L5 fusion surgery, with recurrent disc extrusion with medical management with Neurontin, Lyrica, tramadol presenting for further evaluation and management of headaches.     Headache:  Onset: Since 2017   Quality/Character: Left sided fronto - temporal / Throbbing; forehead / No aura / No photosensitivity / + phonosensitivity / + nausea / - intermittent tinnitus / -  vomiting / No complex focal sensory or motor deficits / Added symptoms of nasal congestion and lacrimation   Duration: 4 hrs to max of 2 days   Frequency: Average 4 / week   Intensity: Average 4/10 - Max 8/10   Triggers: Stress related job / family etc   Medications: Abortive regimen - Fioricet / Excedrin / Ceflay. Preventive regimen: Background of  Neurontin, Lyrica, tramadol    Headache Triggers:   Bright or flickering light +   Strong smell- cut grass, perfume   Fatigue +   Lack of sleep +   Vigorous exercise   Dietary factors   Visual strain   Weather changes +   Exertion   Heat (hot weather, hot baths or showers) +     Pertinent past medical history:   - TBI: None   - Neck trauma: None  - Tension headaches / Cluster headaches / Occipital neuralgia / Other headache syndromes: None   - Depression + / anxiety - / bipolar - / schizophrenia -    - Seizures: None    - Stroke: None   -  Motion sickness : None     Pertinent investigations:   MRI brain: 2016:   Impression      Age-appropriate generalized volume loss with few scattered foci of T2/FLAIR signal abnormality throughout the supratentorial white matter  while nonspecific suggest for a mild degree of  chronic microvascular ischemic change.       Family history:   - Migraines / Tension headaches / Cluster headaches / Occipital neuralgia / Other headache syndromes: None        ROS:   Review of Systems   Constitutional:  Negative for weight loss.   HENT: Negative for hearing loss.   Eyes: Negative for blurred vision and double vision.   Respiratory: Negative for shortness of breath and stridor.   Cardiovascular: Negative for chest pain and palpitations.   Gastrointestinal: Negative for nausea, vomiting and constipation.   Genitourinary: Negative for frequency. Negative for urgency.   Musculoskeletal: Back pain / Left sided sciatica  Skin: Negative for rash.   Neurological: Negative for dizziness and tremors. Left sided sciatica  Endo/Heme/Allergies: Does not bruise/bleed easily.   Psychiatric/Behavioral: Negative for memory loss. Negative for hallucinations. The patient is not nervous/anxious.      Past Medical History  Past Medical History:   Diagnosis Date    Adenomatous polyp 2003    Adenomatous polyp     Allergy     Arthritis     Back pain     after trauma beginning in 195    Cataract     Chronic pain     neck and left shoulder    Cluster headache 2013    Colon polyp     Degenerative disc disease     Depression     Diverticulitis 12/2013    Elevated PSA     Fibromyalgia 2013    GERD (gastroesophageal reflux disease)     Hepatitis 1970's    A    History of prostate biopsy 2002    Hyperlipidemia     Joint pain     Sleep apnea     Special screening for malignant neoplasms, colon 5/5/2014    Thyroid nodule 7/16/2014    Visual disturbance 2012    problems after cataract surgery       Medications    Current Outpatient  Medications:     butalbital-acetaminophen-caffeine -40 mg (FIORICET, ESGIC) -40 mg per tablet, Take 1 tablet by mouth 6 hours as needed for Headaches., Disp: 30 tablet, Rfl: 0    celecoxib (CELEBREX) 200 MG capsule, Take 1 capsule (200 mg total) by mouth 2 (two) times daily., Disp: 60 capsule, Rfl: 1    clindamycin (CLEOCIN) 150 MG capsule, Take 4 capsules by mouth 1 hour prior to dental appointment, Disp: 12 capsule, Rfl: 1    clonazePAM (KLONOPIN) 0.5 MG tablet, Take up to 2 tablets by mouth every night at bedtime, Disp: 60 tablet, Rfl: 5    cyanocobalamin 1,000 mcg/mL injection, , Disp: , Rfl:     diclofenac sodium (VOLTAREN) 1 % Gel, Apply 2 g topically once daily., Disp: 100 g, Rfl: 0    HYDROcodone-acetaminophen (NORCO) 5-325 mg per tablet, Take 1 tablet by mouth every 8 (eight) hours as needed for Pain (moderate to severe)., Disp: 21 tablet, Rfl: 0    HYDROcodone-acetaminophen (NORCO) 7.5-325 mg per tablet, Take 1 tablet by mouth every 4 (four) hours as needed for Pain., Disp: 60 tablet, Rfl: 0    lamoTRIgine (LAMICTAL) 200 MG tablet, take 1 tablet by mouth every day, Disp: 90 tablet, Rfl: 3    methocarbamol (ROBAXIN) 750 MG Tab, , Disp: , Rfl:     pravastatin (PRAVACHOL) 40 MG tablet, Take 1 tablet (40 mg total) by mouth once daily., Disp: 90 tablet, Rfl: 3    pregabalin (LYRICA) 100 MG capsule, Take 1 capsule (100 mg total) by mouth 3 (three) times daily., Disp: 90 capsule, Rfl: 5    pregabalin (LYRICA) 50 MG capsule, Take 1 capsule (50 mg total) by mouth 2 (two) times daily., Disp: 60 capsule, Rfl: 5    RABEprazole (ACIPHEX) 20 mg tablet, Take 1 tablet (20 mg total) by mouth 2 (two) times daily., Disp: 180 tablet, Rfl: 3    selegiline (EMSAM) 12 mg/24 hr, Place onto the skin once daily., Disp: 90 patch, Rfl: 3    selegiline (EMSAM) 12 mg/24 hr, Place 1 new patch onto the skin once daily., Disp: 90 patch, Rfl: 3    senna-docusate 8.6-50 mg (PERICOLACE) 8.6-50 mg per tablet, Take  2 tablets by mouth nightly as needed for Constipation., Disp: , Rfl:     sucralfate (CARAFATE) 1 gram tablet, TAKE 1 TABLET BY MOUTH 4 TIMES A DAY, Disp: 120 tablet, Rfl: 4    tadalafil (CIALIS) 5 MG tablet, Take 1 tablet (5 mg total) by mouth once daily., Disp: 30 tablet, Rfl: 12    traZODone (DESYREL) 100 MG tablet, take 1 tablet by mouth every night at bedtime, Disp: 90 tablet, Rfl: 3    [START ON 4/2/2020] erenumab-aooe (AIMOVIG AUTOINJECTOR) 70 mg/mL injection, Inject 1 mL (70 mg total) into the skin every 28 days., Disp: 1 mL, Rfl: 5    Allergies  Review of patient's allergies indicates:   Allergen Reactions    Alphagan [brimonidine]      Patient taking MASO-B Selective Inhibitor Selegiline (Emsam)    Coumadin [warfarin]      itch    Oxycodone      hiccups    Pennsaid [diclofenac sodium] Rash       Social History  Social History     Socioeconomic History    Marital status:      Spouse name: Not on file    Number of children: Not on file    Years of education: Not on file    Highest education level: Not on file   Occupational History    Occupation: Psychotherpist    Social Needs    Financial resource strain: Not hard at all    Food insecurity:     Worry: Never true     Inability: Never true    Transportation needs:     Medical: No     Non-medical: No   Tobacco Use    Smoking status: Never Smoker    Smokeless tobacco: Never Used   Substance and Sexual Activity    Alcohol use: Yes     Frequency: 4 or more times a week     Drinks per session: 1 or 2     Binge frequency: Never     Comment: occasion    Drug use: No    Sexual activity: Yes     Partners: Female   Lifestyle    Physical activity:     Days per week: 0 days     Minutes per session: 0 min    Stress: To some extent   Relationships    Social connections:     Talks on phone: More than three times a week     Gets together: Twice a week     Attends [employer] service: Patient refused     Active member of club or  "organization: Patient refused     Attends meetings of clubs or organizations: Patient refused     Relationship status:    Other Topics Concern    Not on file   Social History Narrative    Not on file       Family History  Family History   Problem Relation Age of Onset    Cancer Mother         colon; uterine    Nephrolithiasis Mother     Stroke Mother 86    Pancreatitis Brother     Vision loss Brother         macular degeneration    Diabetes Brother     Macular degeneration Brother     Migraines Sister     No Known Problems Father     No Known Problems Maternal Grandmother     No Known Problems Maternal Grandfather     No Known Problems Paternal Grandmother     No Known Problems Paternal Grandfather     No Known Problems Sister     No Known Problems Maternal Aunt     No Known Problems Maternal Uncle     No Known Problems Paternal Aunt     No Known Problems Paternal Uncle     Melanoma Neg Hx     Amblyopia Neg Hx     Blindness Neg Hx     Cataracts Neg Hx     Glaucoma Neg Hx     Hypertension Neg Hx     Retinal detachment Neg Hx     Strabismus Neg Hx     Thyroid disease Neg Hx     Allergic rhinitis Neg Hx     Allergies Neg Hx     Angioedema Neg Hx     Asthma Neg Hx     Eczema Neg Hx     Urticaria Neg Hx     Rhinitis Neg Hx     Immunodeficiency Neg Hx     Atopy Neg Hx        Physical Exam  Ht 5' 8" (1.727 m)   Wt 79.3 kg (174 lb 13.2 oz)   BMI 26.58 kg/m²     General appearance: Well-developed, well-groomed.     Neurologic Exam: The patient is awake, alert and oriented. Language is fluent. Recent and remote memory are normal. Attention span and concentration are normal. Fund of knowledge is appropriate.     Cranial nerves: pupils are round and reactive to light and accommodation. Visual fields are full to confrontation. Ocular motility is full in all cardinal positions of gaze. Facial sensation is normal to pinprick and light touch. Facial activation is symmetric. Hearing is " normal bilaterally. Palate elevates symmetrically and gag reflex is intact bilaterally. Shoulder elevation is symmetric and full strength bilaterally. Tongue is midline and neck rotation strength is normal bilaterally. Neck range of motion is normal.     Motor examination of all extremities demonstrates normal bulk and tone in all four limbs. There are no atrophy or fasciculations. Affect in hip flexors / knee extensors on left > right     Sensory examination is normal to pinprick, vibration and proprioception in the upper and lower extremities bilaterally. Romberg is negative.    Deep tendon reflexes are asymmetric in the lower extremities - brisk on left. Toes are mute bilaterally.     Gait: Antalgic    Coordination: Finger to nose and heel to shin testing is normal in both upper and lower extremities. Rapid alternating movements are normal in both upper and lower extremities.     General exam  Cardiovascular: regular rate and rhythm with no murmurs, rubs or gallops. There are no carotid or vertebral artery bruits. Pulses in both upper and lower extremities are symmetric. There is no peripheral edema.   Head and neck: no cervical lymphadenopathy      Lab and Test Results    WBC   Date Value Ref Range Status   10/31/2019 7.86 3.90 - 12.70 K/uL Final   10/25/2019 9.23 3.90 - 12.70 K/uL Final   10/17/2019 9.71 3.90 - 12.70 K/uL Final     Hemoglobin   Date Value Ref Range Status   10/31/2019 9.6 (L) 14.0 - 18.0 g/dL Final   10/25/2019 9.8 (L) 14.0 - 18.0 g/dL Final   10/17/2019 9.6 (L) 14.0 - 18.0 g/dL Final     Hematocrit   Date Value Ref Range Status   10/31/2019 31.5 (L) 40.0 - 54.0 % Final   10/25/2019 32.8 (L) 40.0 - 54.0 % Final   10/17/2019 30.7 (L) 40.0 - 54.0 % Final     Platelets   Date Value Ref Range Status   10/31/2019 478 (H) 150 - 350 K/uL Final   10/25/2019 242 150 - 350 K/uL Final   10/17/2019 303 150 - 350 K/uL Final     Glucose   Date Value Ref Range Status   10/31/2019 103 70 - 110 mg/dL Final    10/25/2019 124 (H) 70 - 110 mg/dL Final   02/25/2019 79 70 - 110 mg/dL Final     Sodium   Date Value Ref Range Status   10/31/2019 140 136 - 145 mmol/L Final   10/25/2019 140 136 - 145 mmol/L Final   02/25/2019 138 136 - 145 mmol/L Final     Potassium   Date Value Ref Range Status   10/31/2019 4.2 3.5 - 5.1 mmol/L Final   10/25/2019 3.8 3.5 - 5.1 mmol/L Final   02/25/2019 3.8 3.5 - 5.1 mmol/L Final     Chloride   Date Value Ref Range Status   10/31/2019 103 95 - 110 mmol/L Final   10/25/2019 106 95 - 110 mmol/L Final   02/25/2019 100 95 - 110 mmol/L Final     CO2   Date Value Ref Range Status   10/31/2019 27 23 - 29 mmol/L Final   10/25/2019 27 23 - 29 mmol/L Final   02/25/2019 30 (H) 23 - 29 mmol/L Final     BUN, Bld   Date Value Ref Range Status   10/31/2019 18 8 - 23 mg/dL Final   10/25/2019 14 8 - 23 mg/dL Final   02/25/2019 16 8 - 23 mg/dL Final     Creatinine   Date Value Ref Range Status   10/31/2019 0.8 0.5 - 1.4 mg/dL Final   10/25/2019 0.8 0.5 - 1.4 mg/dL Final   10/17/2019 0.7 0.5 - 1.4 mg/dL Final     Calcium   Date Value Ref Range Status   10/31/2019 9.2 8.7 - 10.5 mg/dL Final   10/25/2019 8.0 (L) 8.7 - 10.5 mg/dL Final   02/25/2019 10.0 8.7 - 10.5 mg/dL Final     Magnesium   Date Value Ref Range Status   02/25/2019 2.4 1.6 - 2.6 mg/dL Final     Alkaline Phosphatase   Date Value Ref Range Status   10/31/2019 66 55 - 135 U/L Final   02/25/2019 51 (L) 55 - 135 U/L Final   12/17/2018 44 (L) 55 - 135 U/L Final     ALT   Date Value Ref Range Status   10/31/2019 25 10 - 44 U/L Final   02/25/2019 28 10 - 44 U/L Final   12/17/2018 37 10 - 44 U/L Final     AST   Date Value Ref Range Status   10/31/2019 21 10 - 40 U/L Final   02/25/2019 22 10 - 40 U/L Final   12/17/2018 28 10 - 40 U/L Final     Images: Independently reviewed   Other Tests: Independently reviewed     Assessment and Plan    Episodic migraine without status migrainosus, not intractable  Migrainous component of presentation / frequency < 15 per  month   Failed preventive: background of AEDs / Antidepressants / tramadol     Interval persisting headaches - frequency / severity despite 3 month trial of Emgality    Plan:  Switch Anti-CGRP / Emgality to Aimovig - Reviewed SE  Continue Neurontin / Lamictal  Abortive regimen:   - Counseled on the medication overuse / rebound headaches with fiorinal / excedrin.   - Ubrogepant trail + Prn tylenol / ibuprofen as abortive regimen. Counseled against considerations of opioids on migraine management     Paroxysmal hemicrania  Atypical features; however quality; one-sided ness; with ANS dysautonomia findings concerning    Plan:  No response to indomethacin trail   Management as above   Headache diary; lifestyle modifications; sleep n diet hygiene    Low back pain without sciatica  - as per NSGY recommendations     S/P lumbar spinal fusion  - plans for revision  - worsening disc extrusion     Degeneration of lumbar or lumbosacral intervertebral disc  - as per NSGY   - WORSENED DISC EXTRUSION    Depression  - continue selegiline     F/u 3 months / Shall transition care to following neuro resident     Tereso Funez MD  Neurology Resident   [facility]  [address] [place] [address]  Pager: [phone]

## 2020-03-10 ENCOUNTER — CLINICAL SUPPORT (OUTPATIENT)
Dept: REHABILITATION | Facility: HOSPITAL | Age: 70
End: 2020-03-10
Attending: NEUROLOGICAL SURGERY
Payer: COMMERCIAL

## 2020-03-10 DIAGNOSIS — R29.898 WEAKNESS OF BOTH LOWER EXTREMITIES: ICD-10-CM

## 2020-03-10 PROCEDURE — 97113 AQUATIC THERAPY/EXERCISES: CPT

## 2020-03-10 NOTE — PROGRESS NOTES
Physical Therapy Daily Treatment Note     Name: Raffy Rutherford Jr.  Clinic Number: [id number]    Therapy Diagnosis:   Encounter Diagnosis   Name Primary?    Weakness of both lower extremities      Physician: Jason Caldwell MD    Visit Date: 3/10/2020      Physician Orders: PT Eval and Treat pool therapy  Medical Diagnosis from Referral:Z98.890 (ICD-10-CM) - S/P lumbar microdiscectomy  Evaluation Date: 11/20/2019; reassess 2/10/2020  Authorization Period Expiration: 12/31/2020  Plan of Care Expiration: 5/4/2020  Visit # / Visits authorized: 17/20  Total Visits: 30    Time In:1600  Time Out: 1700  Total Billable Time: 30 minutes    Precautions: fibromyalgia    PROCEDURES/IMAGING: 10/25/2019: R MIS L5-s1 microdisectomy  Subjective     Pt reports: for the past 3 days my R foot gets stuck & I almost trip  He was compliant with home exercise program.  Response to previous treatment: no muscle soreness  Functional change: not using cane    Pain: 5/10  Back, 7/10 RLE  Location: right back  & RLE    Objective     Raffy received aquatic therapeutic exercises to develop strength, endurance, ROM, flexibility, posture and core stabilization for 80 minutes including:    WARMUP x 2 laps each  Walk fwd/back/side    STRETCHES 2 x 20sec  Hamstring  Quad  Gastroc    LE EX x 20  Step up/over forward Box x 12(no UE support)  Step up/over lateral  Box x 12 ( no UE support)  Step down off box for eccentric quad x 10 each LE  Squat walk lateral purple Tband X 2 laps  Monster walks  purple Tband x 2 laps  Walking lunges purple TBand x 2 laps  Wobble board anterior/posterior x 20-np  Wobble board anterior posterior single LE x 20 each LE  Wobble board lateral  X 20   Wobble board single leg lateral x 20 each LE  Wobble board clockwise x 20  Wobble board counterclockwise x 20  Dive ring retrieval using L foot dorsiflexion & eversion for 4 reps x 3 sets  Leg press  Max resistance noodle/tubing x 20 each LE  Hip extension max resistance  "noodle/tubing x 20 each LE  Hopping forward/back x 1 step for 10 reps- BUE support of DB-np  Hopping lateral x 1 step for 10 reps- BUE support of DB-np  Prone to/from supine transitions using B UE DB support & ankle float wraps x 10-np    UE/CORE EX x 20  Rows blue tubing - 2 sec hold- modified tandem stance  Shoulder extension blue tubing- modified tandem stance  Chest press blue tubing- modified tandem stance  Truncal rotation double strand blue tubing  Mini squat hold with chest press small red kick board- posterior support pool wall  Shoulder extension small red kickboard  Tennis racket to hit soft ball with weight shifting  x 30-np  DKTC to hip extension // bars x 10 -np    BALANCE-NP  Tandem gait forward // bars x 2 laps  Tandem gait backward // bars x 2 laps  Tandem gait forward/ back standing on small diameter noodle x 10  Braiding x 2 laps  Heel walking // bars x 1 lap  Toe walking // bars x 1 lap  Gait // bars emphasis on heel strike & toe off x 2 laps-      ENDURANCE  Bicycle // bars x  6 min    COOL DOWN x 1 lap each  Walk fwd/back/side    Home Exercises Provided and Patient Education Provided     Education provided:   Role of aquatic therapy  Hydration post therapy      Written Home Exercises Provided: yes.  Exercises were reviewed and Raffy was able to demonstrate them prior to the end of the session.  Raffy demonstrated good  understanding of the education provided.     See EMR under Patient Instructions for exercises provided 11/27/2019.; 12/3/2019(dead bug)    Assessment   FOTO limitation score of 55%(was 57%) indicative of some functional improvement  Patient presents with new onset  X 3 days of R foot "sticking" & almost near falls.Patient instructed to restart using cane for safety. Patient with no change in LE strength with exception of 0/5 R great toe extension. Patient with rubor of dependency & cold feet BLE.but patient reports this as his normal. Patient instructed to see his physician. " Deferred balance exercises & hopping exercises today.    Raffy is progressing well towards his goals.   Pt prognosis is Fair.     Pt will continue to benefit from skilled aquatic outpatient physical therapy to address the deficits listed in the problem list box on initial evaluation, provide pt/family education and to maximize pt's level of independence in the home and community environment.     Pt's spiritual, cultural and educational needs considered and pt agreeable to plan of care and goals.    Anticipated barriers to physical therapy: chronic pain    Goals:   Short Term Goals: 6 weeks   1. Patient will be independent in HEP & progressions (progressing)  2. Patient will achieve TUG score of 14 sec or less with single UE support to demonstrate improved mobility (progressing)     Long Term Goals: 12 weeks   1. Patient will have FOTO limitation score of 55% to demonstrate improved overall function & decreased pain (progressing)  2. Patient will achieve FTSTS score of 20 sec or less with single UE support (progressing)  3. Patient will increase R hip/knee MMT to 4+/5 to demonstrate improved strength (progressing)  4. Patient will increase R ankle df to 3+/5 to demonstrate improved strength & decreased gait deviations (progressing)    Plan     Increase resistance with balance challenges    Antonia Ca, PT

## 2020-03-16 ENCOUNTER — CLINICAL SUPPORT (OUTPATIENT)
Dept: REHABILITATION | Facility: HOSPITAL | Age: 70
End: 2020-03-16
Attending: NEUROLOGICAL SURGERY
Payer: COMMERCIAL

## 2020-03-16 DIAGNOSIS — R29.898 WEAKNESS OF BOTH LOWER EXTREMITIES: ICD-10-CM

## 2020-03-16 PROCEDURE — 97113 AQUATIC THERAPY/EXERCISES: CPT

## 2020-03-16 NOTE — PROGRESS NOTES
Physical Therapy Daily Treatment Note     Name: Raffy Rutherford Jr.  Clinic Number: [id number]    Therapy Diagnosis:   Encounter Diagnosis   Name Primary?    Weakness of both lower extremities      Physician: Jason Caldwell MD    Visit Date: 3/16/2020      Physician Orders: PT Eval and Treat pool therapy  Medical Diagnosis from Referral:[id number] (ICD-10-CM) - S/P lumbar microdiscectomy  Evaluation Date: 11/20/2019; reassess 2/10/2020  Authorization Period Expiration: 12/31/2020  Plan of Care Expiration: 5/4/2020  Visit # / Visits authorized: 18/20  Total Visits: 31    Time In:0900  Time Out: 1000  Total Billable Time: 30 minutes    Precautions: fibromyalgia    PROCEDURES/IMAGING: 10/25/2019: R MIS L5-s1 microdisectomy  Subjective     Pt reports: My right fott still is getting stuck but I have not fallen  He was compliant with home exercise program.  Response to previous treatment: no muscle soreness  Functional change: not using cane    Pain: 7/10  Back, 7/10 RLE  Location: right back  & RLE    Objective     Raffy received aquatic therapeutic exercises to develop strength, endurance, ROM, flexibility, posture and core stabilization for 80 minutes including:    WARMUP x 2 laps each  Walk fwd/back/side    STRETCHES 2 x 20sec  Hamstring  Quad  Gastroc    LE EX x 20  Step up/over forward Box x 12(no UE support)  Step up/over lateral  Box x 12 ( no UE support)  Step down off box for eccentric quad x 10 each LE  Squat walk lateral purple Tband X 2 laps  Monster walks  purple Tband x 2 laps  Walking lunges purple TBand x 2 laps  Wobble board anterior/posterior x 20-np  Wobble board anterior posterior single LE x 20 each LE  Wobble board lateral  X 20   Wobble board single leg lateral x 20 each LE  Wobble board clockwise x 20  Wobble board counterclockwise x 20  Dive ring retrieval using L foot dorsiflexion & eversion for 4 reps x 3 sets  Leg press  Max resistance noodle/tubing x 20 each LE  Hip extension max resistance  noodle/tubing x 20 each LE  Hopping forward/back x 1 step for 10 reps- BUE support of DB-np  Hopping lateral x 1 step for 10 reps- BUE support of DB-np  Prone to/from supine transitions using B UE DB support & ankle float wraps x 10-np    UE/CORE EX x 20  Rows blue tubing - 2 sec hold- modified tandem stance  Shoulder extension blue tubing- modified tandem stance  Chest press blue tubing- modified tandem stance  Truncal rotation double strand blue tubing  Mini squat hold with chest press small red kick board- posterior support pool wall  Shoulder extension small red kickboard  Tennis racket to hit soft ball with weight shifting  x 30-np  DKTC to hip extension // bars x 10 -np    BALANCE-NP  Tandem gait forward // bars x 2 laps  Tandem gait backward // bars x 2 laps  Tandem gait forward/ back standing on small diameter noodle x 10  Braiding x 2 laps  Heel walking // bars x 1 lap  Toe walking // bars x 1 lap  Gait // bars emphasis on heel strike & toe off x 2 laps-      ENDURANCE  Bicycle // bars x  6 min    COOL DOWN x 1 lap each  Walk fwd/back/side    Home Exercises Provided and Patient Education Provided     Education provided:   Role of aquatic therapy  Hydration post therapy      Written Home Exercises Provided: yes.  Exercises were reviewed and Raffy was able to demonstrate them prior to the end of the session.  Raffy demonstrated good  understanding of the education provided.     See EMR under Patient Instructions for exercises provided 11/27/2019.; 12/3/2019(dead bug)    Assessment     Patient presents with high pain levels but able to complete exercises with only minimal increase in RLE & back pain. Patient still with R foot dragging with gait & requires increased hip/knee flexion to facilitate foot clearance    Raffy is progressing well towards his goals.   Pt prognosis is Fair.     Pt will continue to benefit from skilled aquatic outpatient physical therapy to address the deficits listed in the problem  list box on initial evaluation, provide pt/family education and to maximize pt's level of independence in the home and community environment.     Pt's spiritual, cultural and educational needs considered and pt agreeable to plan of care and goals.    Anticipated barriers to physical therapy: chronic pain    Goals:   Short Term Goals: 6 weeks   1. Patient will be independent in HEP & progressions (progressing)  2. Patient will achieve TUG score of 14 sec or less with single UE support to demonstrate improved mobility (progressing)     Long Term Goals: 12 weeks   1. Patient will have FOTO limitation score of 55% to demonstrate improved overall function & decreased pain (progressing)  2. Patient will achieve FTSTS score of 20 sec or less with single UE support (progressing)  3. Patient will increase R hip/knee MMT to 4+/5 to demonstrate improved strength (progressing)  4. Patient will increase R ankle df to 3+/5 to demonstrate improved strength & decreased gait deviations (progressing)    Plan     Increase resistance with balance challenges    Antonia Ca, PT

## 2020-03-17 ENCOUNTER — TELEPHONE (OUTPATIENT)
Dept: PHARMACY | Facility: CLINIC | Age: 70
End: 2020-03-17

## 2020-03-17 NOTE — TELEPHONE ENCOUNTER
Prescription for Aimovig autoinjector required prior authorization. Prior authorization was submitted, and has been approved by insurance through 09/10/2020. Patient has been made aware and will  prescription this week.     Melissa Mahajan, PharmD  [facility]

## 2020-03-20 ENCOUNTER — DOCUMENTATION ONLY (OUTPATIENT)
Dept: REHABILITATION | Facility: HOSPITAL | Age: 70
End: 2020-03-20

## 2020-03-20 ENCOUNTER — TELEPHONE (OUTPATIENT)
Dept: PHARMACY | Facility: CLINIC | Age: 70
End: 2020-03-20

## 2020-03-20 NOTE — PROGRESS NOTES
Left voicemail with patient due to therapy following updates regarding COVID-19 closely and taking every precaution to ensure the safety of our patients, staff and community.  In an abundance of caution and in an effort to help reduce risk and limit community spread, we have decided to temporarily postpone appointments for patients who may be at increased risk to attend in-person therapy or where therapy is not critically needed at this time. Patient has what they need to continue therapy at home.

## 2020-03-26 ENCOUNTER — DOCUMENTATION ONLY (OUTPATIENT)
Dept: PRIMARY CARE CLINIC | Facility: CLINIC | Age: 70
End: 2020-03-26

## 2020-03-26 ENCOUNTER — TELEPHONE (OUTPATIENT)
Dept: PHARMACY | Facility: CLINIC | Age: 70
End: 2020-03-26

## 2020-03-26 NOTE — PROGRESS NOTES
Reason for Withdrawal:  Unreachable by Phone or Mail.    Last level completed: Initial Screening completed by MERCEDES Samaniego on October 30, 2019; Initial intake with Bean Alfred LCSW on November 14, 2019 ; last visit with Bean Alfred LCSW on December 20, 2019   If Unreachable:  Date of Last Contact Attempted:March 5, 2020 by letter from Hieu Howell.   Date of Last Contact Made: January 17, 2020 by Selena Yeung

## 2020-03-27 ENCOUNTER — TELEPHONE (OUTPATIENT)
Dept: PRIMARY CARE CLINIC | Facility: CLINIC | Age: 70
End: 2020-03-27

## 2020-04-01 ENCOUNTER — DOCUMENTATION ONLY (OUTPATIENT)
Dept: REHABILITATION | Facility: HOSPITAL | Age: 70
End: 2020-04-01

## 2020-04-01 NOTE — PROGRESS NOTES
Spoke with patient in regards to HEP. Patient reports compliance. Reviewed low back stretches of lower truncal rotation, DKTC, SKTC, hamstring stretch as well as ankle strengthening. Patient reports limited compliance

## 2020-04-08 ENCOUNTER — PATIENT MESSAGE (OUTPATIENT)
Dept: GASTROENTEROLOGY | Facility: CLINIC | Age: 70
End: 2020-04-08

## 2020-04-08 DIAGNOSIS — K21.9 GASTROESOPHAGEAL REFLUX DISEASE, ESOPHAGITIS PRESENCE NOT SPECIFIED: ICD-10-CM

## 2020-04-09 RX ORDER — RABEPRAZOLE SODIUM 20 MG/1
20 TABLET, DELAYED RELEASE ORAL 2 TIMES DAILY
Qty: 180 TABLET | Refills: 3 | Status: SHIPPED | OUTPATIENT
Start: 2020-04-09 | End: 2021-03-08 | Stop reason: SDUPTHER

## 2020-04-16 ENCOUNTER — PATIENT MESSAGE (OUTPATIENT)
Dept: REHABILITATION | Facility: HOSPITAL | Age: 70
End: 2020-04-16

## 2020-05-01 RX ORDER — HYDROCODONE BITARTRATE AND ACETAMINOPHEN 5; 325 MG/1; MG/1
1 TABLET ORAL EVERY 12 HOURS PRN
Qty: 60 TABLET | Refills: 0 | Status: SHIPPED | OUTPATIENT
Start: 2020-05-01 | End: 2020-07-09 | Stop reason: SDUPTHER

## 2020-05-04 ENCOUNTER — PATIENT MESSAGE (OUTPATIENT)
Dept: REHABILITATION | Facility: HOSPITAL | Age: 70
End: 2020-05-04

## 2020-05-04 RX ORDER — BUTALBITAL, ACETAMINOPHEN AND CAFFEINE 50; 325; 40 MG/1; MG/1; MG/1
1 TABLET ORAL
Qty: 30 TABLET | Refills: 0 | Status: SHIPPED | OUTPATIENT
Start: 2020-05-04 | End: 2020-08-14 | Stop reason: SDUPTHER

## 2020-05-07 ENCOUNTER — OFFICE VISIT (OUTPATIENT)
Dept: INTERNAL MEDICINE | Facility: CLINIC | Age: 70
End: 2020-05-07
Payer: COMMERCIAL

## 2020-05-07 ENCOUNTER — HOSPITAL ENCOUNTER (OUTPATIENT)
Dept: RADIOLOGY | Facility: HOSPITAL | Age: 70
Discharge: HOME | End: 2020-05-07
Attending: INTERNAL MEDICINE
Payer: COMMERCIAL

## 2020-05-07 ENCOUNTER — PATIENT MESSAGE (OUTPATIENT)
Dept: INTERNAL MEDICINE | Facility: CLINIC | Age: 70
End: 2020-05-07

## 2020-05-07 ENCOUNTER — TELEPHONE (OUTPATIENT)
Dept: INTERNAL MEDICINE | Facility: CLINIC | Age: 70
End: 2020-05-07

## 2020-05-07 ENCOUNTER — PATIENT MESSAGE (OUTPATIENT)
Dept: UROLOGY | Facility: CLINIC | Age: 70
End: 2020-05-07

## 2020-05-07 VITALS
HEIGHT: 68 IN | SYSTOLIC BLOOD PRESSURE: 114 MMHG | OXYGEN SATURATION: 99 % | WEIGHT: 173.75 LBS | BODY MASS INDEX: 26.33 KG/M2 | HEART RATE: 70 BPM | DIASTOLIC BLOOD PRESSURE: 70 MMHG

## 2020-05-07 DIAGNOSIS — N13.8 BPH WITH URINARY OBSTRUCTION: Primary | ICD-10-CM

## 2020-05-07 DIAGNOSIS — R10.12 ABDOMINAL PAIN, LEFT UPPER QUADRANT: Primary | ICD-10-CM

## 2020-05-07 DIAGNOSIS — R97.20 ELEVATED PSA: ICD-10-CM

## 2020-05-07 DIAGNOSIS — R10.12 ABDOMINAL PAIN, LEFT UPPER QUADRANT: ICD-10-CM

## 2020-05-07 DIAGNOSIS — N40.1 BPH WITH URINARY OBSTRUCTION: Primary | ICD-10-CM

## 2020-05-07 PROCEDURE — 99999 PR PBB SHADOW E&M-EST. PATIENT-LVL IV: ICD-10-PCS | Mod: PBBFAC,,, | Performed by: INTERNAL MEDICINE

## 2020-05-07 PROCEDURE — 25500020 PHARM REV CODE 255: Performed by: INTERNAL MEDICINE

## 2020-05-07 PROCEDURE — 74177 CT ABD & PELVIS W/CONTRAST: CPT | Mod: TC

## 2020-05-07 PROCEDURE — 99214 OFFICE O/P EST MOD 30 MIN: CPT | Mod: S$GLB,,, | Performed by: INTERNAL MEDICINE

## 2020-05-07 PROCEDURE — 1159F PR MEDICATION LIST DOCUMENTED IN MEDICAL RECORD: ICD-10-PCS | Mod: S$GLB,,, | Performed by: INTERNAL MEDICINE

## 2020-05-07 PROCEDURE — 1101F PT FALLS ASSESS-DOCD LE1/YR: CPT | Mod: CPTII,S$GLB,, | Performed by: INTERNAL MEDICINE

## 2020-05-07 PROCEDURE — 1159F MED LIST DOCD IN RCRD: CPT | Mod: S$GLB,,, | Performed by: INTERNAL MEDICINE

## 2020-05-07 PROCEDURE — 99214 PR OFFICE/OUTPT VISIT, EST, LEVL IV, 30-39 MIN: ICD-10-PCS | Mod: S$GLB,,, | Performed by: INTERNAL MEDICINE

## 2020-05-07 PROCEDURE — 1125F PR PAIN SEVERITY QUANTIFIED, PAIN PRESENT: ICD-10-PCS | Mod: S$GLB,,, | Performed by: INTERNAL MEDICINE

## 2020-05-07 PROCEDURE — 1101F PR PT FALLS ASSESS DOC 0-1 FALLS W/OUT INJ PAST YR: ICD-10-PCS | Mod: CPTII,S$GLB,, | Performed by: INTERNAL MEDICINE

## 2020-05-07 PROCEDURE — 99999 PR PBB SHADOW E&M-EST. PATIENT-LVL IV: CPT | Mod: PBBFAC,,, | Performed by: INTERNAL MEDICINE

## 2020-05-07 PROCEDURE — 74177 CT ABDOMEN PELVIS WITH CONTRAST: ICD-10-PCS | Mod: 26,,, | Performed by: RADIOLOGY

## 2020-05-07 PROCEDURE — 1125F AMNT PAIN NOTED PAIN PRSNT: CPT | Mod: S$GLB,,, | Performed by: INTERNAL MEDICINE

## 2020-05-07 PROCEDURE — 74177 CT ABD & PELVIS W/CONTRAST: CPT | Mod: 26,,, | Performed by: RADIOLOGY

## 2020-05-07 RX ADMIN — IOHEXOL 15 ML: 350 INJECTION, SOLUTION INTRAVENOUS at 04:05

## 2020-05-07 RX ADMIN — IOHEXOL 75 ML: 350 INJECTION, SOLUTION INTRAVENOUS at 05:05

## 2020-05-07 NOTE — PROGRESS NOTES
Subjective:      Patient ID: Raffy Rutherford Jr. is a 70 y.o. male.    Chief Complaint: Abdominal Pain    HPI:  Abdominal Pain   This is a new problem. The current episode started 1 to 4 weeks ago. The problem has been gradually worsening. The pain is located in the LLQ. The pain is moderate. Associated symptoms include diarrhea. Pertinent negatives include no fever. Associated symptoms comments: bloated. Exacerbated by: Sorbital. The pain is relieved by belching. Treatments tried: Simethicone and GasX not helpful.     Patient Active Problem List   Diagnosis    Depression    Hyperlipidemia    Chronic pain    Colon polyp    Adenomatous polyp    History of prostate biopsy    GERD (gastroesophageal reflux disease)    Back pain    Sleep apnea    Visual disturbances    Spondylosis without myelopathy    Degeneration of lumbar or lumbosacral intervertebral disc    Spinal stenosis, lumbar region, without neurogenic claudication    Thoracic or lumbosacral neuritis or radiculitis, unspecified    Displacement of lumbar intervertebral disc without myelopathy    Acquired spondylolisthesis    Lumbago    Status post cataract extraction and insertion of intraocular lens - Both Eyes    Prostatitis, acute    Fibromyalgia    OP (osteoporosis)    Compression fracture of T12 vertebra    Diverticulitis large intestine    Osteoarthritis    Lower back pain    Lower extremity pain    Muscle weakness    Range of motion deficit    Special screening for malignant neoplasms, colon    Cervicalgia    Facet joint disease of cervical region    Occipital neuralgia    Chronic migraine without aura, with intractable migraine, so stated, with status migrainosus    Cervical radiculopathy    Paroxysmal hemicrania    Sciatic nerve pain    Chronic LBP    DJD (degenerative joint disease) of lumbar spine    Chronic neck pain    Right lumbar radiculopathy    Thyroid nodule    Carpal tunnel syndrome of right wrist     Paresthesia    Brow ptosis    Degenerative disc disease    Encounter for postoperative wound check    Lumbar radiculopathy    Cervical spondylosis    S/P lumbar spinal fusion    Right wrist tendinitis    Pain in right wrist    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    Headache around the eyes    Closed fracture of left distal radius    Pain in left wrist    Bilateral foot-drop    Idiopathic peripheral neuropathy    Sacroiliac joint dysfunction of right side    SI (sacroiliac) joint dysfunction    Episodic migraine without status migrainosus, not intractable    Lumbar disc herniation with radiculopathy    Anxiety about health    MDD (major depressive disorder), recurrent episode, moderate    Anxiety    Weakness of both lower extremities    History of colon polyps     Past Medical History:   Diagnosis Date    Adenomatous polyp 2003    Adenomatous polyp     Allergy     Arthritis     Back pain     after trauma beginning in 195    Cataract     Chronic pain     neck and left shoulder    Cluster headache 2013    Colon polyp     Degenerative disc disease     Depression     Diverticulitis 12/2013    Elevated PSA     Fibromyalgia 2013    GERD (gastroesophageal reflux disease)     Hepatitis 1970's    A    History of prostate biopsy 2002    Hyperlipidemia     Joint pain     Sleep apnea     Special screening for malignant neoplasms, colon 5/5/2014    Thyroid nodule 7/16/2014    Visual disturbance 2012    problems after cataract surgery     Past Surgical History:   Procedure Laterality Date    BACK SURGERY      CATARACT EXTRACTION W/  INTRAOCULAR LENS IMPLANT Bilateral     CHOLECYSTECTOMY      COLONOSCOPY N/A 12/17/2019    Procedure: COLONOSCOPY;  Surgeon: Amadou Hardin MD;  Location: [address]);  Service: Endoscopy;  Laterality: N/A;  pt request to do Miralax bowel prep-BB    COSMETIC SURGERY  2/10/2015    Direct mid-forehead brow lift    COSMETIC  SURGERY  2/10/2015    Bilateral upper lid blepahroplasty    ESOPHAGOGASTRODUODENOSCOPY N/A 12/17/2019    Procedure: ESOPHAGOGASTRODUODENOSCOPY (EGD);  Surgeon: Amadou Hardin MD;  Location: [address]);  Service: Endoscopy;  Laterality: N/A;    EYE SURGERY      HEMORRHOID SURGERY      with complication of chronic bleeding for 6 weeks     INJECTION OF STEROID Right 12/6/2018    Procedure: INJECTION, STEROID Right SI Joint Block and Steroid Injection;  Surgeon: Jason Caldwell MD;  Location: [facility];  Service: Neurosurgery;  Laterality: Right;  Procedure: Right SI Joint Block and Steroid Injection  Surgery Time: 30 MIN  LOS: 0  Anesthesia: MAC  Radiology: C-arm  Bed: [place] [id number] Poster  Position: Prone    INJECTION OF STEROID Right 9/19/2019    Procedure: INJECTION, STEROID Procedure: Right SI joint block nd steroid injection;  Surgeon: Jason Caldwell MD;  Location: [facility];  Service: Neurosurgery;  Laterality: Right;  Procedure: Right SI joint block nd steroid injection  Surgery Time: 30 mins  LOS:   Anesthesia: General MAC  Radiology:C-arm  Bed: Regular Bed  Position: Prone    JOINT REPLACEMENT      KNEE SURGERY      involving arthroscopic surgery to both knees    SINUS SURGERY      SINUS SURGERY      left molar and sinus surgery for trigeminal neuralgia    SPINAL FUSION  6/22/2015    L3-L5 XLIF    SPINE SURGERY  6/22/15    XLIF/TANA    TOTAL HIP ARTHROPLASTY  4/2012    Pt states he had total hip replacement on his left hip.     Family History   Problem Relation Age of Onset    Cancer Mother         colon; uterine    Nephrolithiasis Mother     Stroke Mother 86    Pancreatitis Brother     Vision loss Brother         macular degeneration    Diabetes Brother     Macular degeneration Brother     Migraines Sister     No Known Problems Father     No Known Problems Maternal Grandmother     No Known Problems Maternal Grandfather     No Known Problems Paternal Grandmother     No Known  "Problems Paternal Grandfather     No Known Problems Sister     No Known Problems Maternal Aunt     No Known Problems Maternal Uncle     No Known Problems Paternal Aunt     No Known Problems Paternal Uncle     Melanoma Neg Hx     Amblyopia Neg Hx     Blindness Neg Hx     Cataracts Neg Hx     Glaucoma Neg Hx     Hypertension Neg Hx     Retinal detachment Neg Hx     Strabismus Neg Hx     Thyroid disease Neg Hx     Allergic rhinitis Neg Hx     Allergies Neg Hx     Angioedema Neg Hx     Asthma Neg Hx     Eczema Neg Hx     Urticaria Neg Hx     Rhinitis Neg Hx     Immunodeficiency Neg Hx     Atopy Neg Hx      Review of Systems   Constitutional: Negative for activity change, chills, fever and unexpected weight change.   Respiratory: Negative for cough, chest tightness, shortness of breath and wheezing.    Cardiovascular: Negative for chest pain, palpitations and leg swelling.   Gastrointestinal: Positive for abdominal pain and diarrhea.     Objective:     Vitals:    05/07/20 1009   BP: 114/70   Pulse: 70   SpO2: 99%   Weight: 78.8 kg (173 lb 11.6 oz)   Height: 5' 8" (1.727 m)   PainSc:   5     Body mass index is 26.41 kg/m².  Physical Exam   Constitutional: He is oriented to person, place, and time. He appears well-developed and well-nourished. No distress.   Neck: Carotid bruit is not present. No thyromegaly present.   Cardiovascular: Normal rate, regular rhythm and normal heart sounds. PMI is not displaced.   Pulmonary/Chest: Effort normal and breath sounds normal. No respiratory distress.   Abdominal: Soft. Bowel sounds are normal. He exhibits no distension. There is tenderness.   Tenderness over the left lower quadrant and sigmoid area   Musculoskeletal: He exhibits no edema.   Neurological: He is alert and oriented to person, place, and time.     Assessment:     1. Abdominal pain, left upper quadrant    2. Elevated PSA      Plan:   Raffy was seen today for abdominal pain.    Diagnoses and all " orders for this visit:    Abdominal pain, left upper quadrant  -     CT Abdomen Pelvis With Contrast; Future  -     Creatinine, serum; Future  -     CBC auto differential; Future  -     Comprehensive metabolic panel; Future    Elevated PSA  -     PROSTATE SPECIFIC ANTIGEN, DIAGNOSTIC; Future        Problem List Items Addressed This Visit     None      Visit Diagnoses     Abdominal pain, left upper quadrant    -  Primary    Relevant Orders    CT Abdomen Pelvis With Contrast    Creatinine, serum (Completed)    CBC auto differential (Completed)    Comprehensive metabolic panel (Completed)    Elevated PSA        Relevant Orders    PROSTATE SPECIFIC ANTIGEN, DIAGNOSTIC (Completed)        Orders Placed This Encounter   Procedures    CT Abdomen Pelvis With Contrast     Standing Status:   Future     Number of Occurrences:   1     Standing Expiration Date:   5/7/2021     Order Specific Question:   Is the patient allergic to iodine or contrast?     Answer:   No     Order Specific Question:   Is the patient on ANY Metformin medication such as Glucophage/Glucovance ?     Answer:   No     Order Specific Question:   Does the patient have any of the following risk factors?     Answer:   None    Creatinine, serum     Standing Status:   Future     Number of Occurrences:   1     Standing Expiration Date:   7/6/2021    CBC auto differential     Standing Status:   Future     Number of Occurrences:   1     Standing Expiration Date:   7/6/2020    Comprehensive metabolic panel     Standing Status:   Future     Number of Occurrences:   1     Standing Expiration Date:   7/6/2020    PROSTATE SPECIFIC ANTIGEN, DIAGNOSTIC     Standing Status:   Future     Number of Occurrences:   1     Standing Expiration Date:   7/6/2021     Follow up for Virtual visit day after CT.     Medication List           Accurate as of May 7, 2020  4:49 PM. If you have any questions, ask your nurse or doctor.               CONTINUE taking these medications     butalbital-acetaminophen-caffeine -40 mg -40 mg per tablet  Commonly known as:  FIORICET, ESGIC  Take 1 tablet by mouth 6 hours as needed for Headaches.     clonazePAM 0.5 MG tablet  Commonly known as:  KLONOPIN  Take two tablets by mouth at bedtime and then one tablet by mouth as needed for tinnitus.     cyanocobalamin 1,000 mcg/mL injection     diclofenac sodium 1 % Gel  Commonly known as:  VOLTAREN  Apply 2 g topically once daily.     * EMSAM 12 mg/24 hr  Generic drug:  selegiline  Place onto the skin once daily.     * EMSAM 12 mg/24 hr  Generic drug:  selegiline  Place 1 new patch onto the skin once daily.     HYDROcodone-acetaminophen 5-325 mg per tablet  Commonly known as:  NORCO  Take 1 tablet by mouth every 12 (twelve) hours as needed for Pain (moderate to severe).     lamoTRIgine 200 MG tablet  Commonly known as:  LAMICTAL  take 1 tablet by mouth every day     methocarbamoL 750 MG Tab  Commonly known as:  ROBAXIN     pravastatin 40 MG tablet  Commonly known as:  PRAVACHOL  Take 1 tablet (40 mg total) by mouth once daily.     * pregabalin 50 MG capsule  Commonly known as:  LYRICA  Take 1 capsule (50 mg total) by mouth 2 (two) times daily.     * pregabalin 100 MG capsule  Commonly known as:  LYRICA  Take 1 capsule (100 mg total) by mouth 3 (three) times daily.     RABEprazole 20 mg tablet  Commonly known as:  ACIPHEX  Take 1 tablet (20 mg total) by mouth 2 (two) times daily.     senna-docusate 8.6-50 mg 8.6-50 mg per tablet  Commonly known as:  PERICOLACE  Take 2 tablets by mouth nightly as needed for Constipation.     sucralfate 1 gram tablet  Commonly known as:  CARAFATE  TAKE 1 TABLET BY MOUTH 4 TIMES A DAY     tadalafiL 5 MG tablet  Commonly known as:  CIALIS  Take 1 tablet (5 mg total) by mouth once daily.     traZODone 100 MG tablet  Commonly known as:  DESYREL  take 1 tablet by mouth every night at bedtime         * This list has 4 medication(s) that are the same as other medications  prescribed for you. Read the directions carefully, and ask your doctor or other care provider to review them with you.

## 2020-05-08 ENCOUNTER — PATIENT MESSAGE (OUTPATIENT)
Dept: INTERNAL MEDICINE | Facility: CLINIC | Age: 70
End: 2020-05-08

## 2020-05-08 ENCOUNTER — TELEPHONE (OUTPATIENT)
Dept: INTERNAL MEDICINE | Facility: CLINIC | Age: 70
End: 2020-05-08

## 2020-05-08 DIAGNOSIS — R97.20 ELEVATED PSA: Primary | ICD-10-CM

## 2020-05-08 DIAGNOSIS — D50.9 MICROCYTIC ANEMIA: ICD-10-CM

## 2020-05-08 RX ORDER — AMOXICILLIN AND CLAVULANATE POTASSIUM 875; 125 MG/1; MG/1
1 TABLET, FILM COATED ORAL 2 TIMES DAILY
Qty: 14 TABLET | Refills: 0 | Status: SHIPPED | OUTPATIENT
Start: 2020-05-08 | End: 2020-07-10

## 2020-05-08 RX ORDER — FAMOTIDINE 20 MG/1
20 TABLET, FILM COATED ORAL 2 TIMES DAILY
Qty: 60 TABLET | Refills: 0 | Status: SHIPPED | OUTPATIENT
Start: 2020-05-08 | End: 2020-06-01

## 2020-05-08 NOTE — TELEPHONE ENCOUNTER
I spoke to the patient today    Treat as diverticulitis:  Augmentin and Famotidine    Urology consult    Check Fe/TIBC, Ferritin, retic cbc count

## 2020-05-09 ENCOUNTER — LAB VISIT (OUTPATIENT)
Dept: LAB | Facility: HOSPITAL | Age: 70
End: 2020-05-09
Attending: NURSE PRACTITIONER
Payer: COMMERCIAL

## 2020-05-09 DIAGNOSIS — R97.20 ELEVATED PSA: ICD-10-CM

## 2020-05-09 DIAGNOSIS — N40.1 BPH WITH URINARY OBSTRUCTION: ICD-10-CM

## 2020-05-09 DIAGNOSIS — N13.8 BPH WITH URINARY OBSTRUCTION: ICD-10-CM

## 2020-05-09 LAB
BILIRUB UR QL STRIP: NEGATIVE
CLARITY UR REFRACT.AUTO: CLEAR
COLOR UR AUTO: YELLOW
GLUCOSE UR QL STRIP: NEGATIVE
HGB UR QL STRIP: NEGATIVE
KETONES UR QL STRIP: NEGATIVE
LEUKOCYTE ESTERASE UR QL STRIP: NEGATIVE
NITRITE UR QL STRIP: NEGATIVE
PH UR STRIP: 7 [PH] (ref 5–8)
PROT UR QL STRIP: NEGATIVE
SP GR UR STRIP: 1.02 (ref 1–1.03)
URN SPEC COLLECT METH UR: NORMAL

## 2020-05-09 PROCEDURE — 87086 URINE CULTURE/COLONY COUNT: CPT

## 2020-05-09 PROCEDURE — 81003 URINALYSIS AUTO W/O SCOPE: CPT

## 2020-05-10 ENCOUNTER — PATIENT MESSAGE (OUTPATIENT)
Dept: INTERNAL MEDICINE | Facility: CLINIC | Age: 70
End: 2020-05-10

## 2020-05-10 NOTE — PROGRESS NOTES
Subjective:      Patient ID: Raffy Rutherford Jr. is a 70 y.o. male.    Chief Complaint: Follow-up    HPI:  HPI   The patient location is: home  The chief complaint leading to consultation is: elevated PSA, anemia and abdominal bloating  Visit type: audiovisual  Total time spent with patient: 15-20 as I had handled and ordered labs over the weekend  Each patient to whom he or she provides medical services by telemedicine is:  (1) informed of the relationship between the physician and patient and the respective role of any other health care provider with respect to management of the patient; and (2) notified that he or she may decline to receive medical services by telemedicine and may withdraw from such care at any time.    Notes:     CT Scan: negative for diverticulitis: discussed based on exam could try Augment.    Anemia: further studies ordered and appears Iron deficient    Elevated PSA: patient should make Urology appt  Patient Active Problem List   Diagnosis    Depression    Hyperlipidemia    Chronic pain    Colon polyp    Adenomatous polyp    History of prostate biopsy    GERD (gastroesophageal reflux disease)    Back pain    Sleep apnea    Visual disturbances    Spondylosis without myelopathy    Degeneration of lumbar or lumbosacral intervertebral disc    Spinal stenosis, lumbar region, without neurogenic claudication    Thoracic or lumbosacral neuritis or radiculitis, unspecified    Displacement of lumbar intervertebral disc without myelopathy    Acquired spondylolisthesis    Lumbago    Status post cataract extraction and insertion of intraocular lens - Both Eyes    Prostatitis, acute    Fibromyalgia    OP (osteoporosis)    Compression fracture of T12 vertebra    Diverticulitis large intestine    Osteoarthritis    Lower back pain    Lower extremity pain    Muscle weakness    Range of motion deficit    Special screening for malignant neoplasms, colon    Cervicalgia    Facet joint  disease of cervical region    Occipital neuralgia    Chronic migraine without aura, with intractable migraine, so stated, with status migrainosus    Cervical radiculopathy    Paroxysmal hemicrania    Sciatic nerve pain    Chronic LBP    DJD (degenerative joint disease) of lumbar spine    Chronic neck pain    Right lumbar radiculopathy    Thyroid nodule    Carpal tunnel syndrome of right wrist    Paresthesia    Brow ptosis    Degenerative disc disease    Encounter for postoperative wound check    Lumbar radiculopathy    Cervical spondylosis    S/P lumbar spinal fusion    Right wrist tendinitis    Pain in right wrist    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    Headache around the eyes    Closed fracture of left distal radius    Pain in left wrist    Bilateral foot-drop    Idiopathic peripheral neuropathy    Sacroiliac joint dysfunction of right side    SI (sacroiliac) joint dysfunction    Episodic migraine without status migrainosus, not intractable    Lumbar disc herniation with radiculopathy    Anxiety about health    MDD (major depressive disorder), recurrent episode, moderate    Anxiety    Weakness of both lower extremities    History of colon polyps     Past Medical History:   Diagnosis Date    Adenomatous polyp 2003    Adenomatous polyp     Allergy     Arthritis     Back pain     after trauma beginning in 195    Cataract     Chronic pain     neck and left shoulder    Cluster headache 2013    Colon polyp     Degenerative disc disease     Depression     Diverticulitis 12/2013    Elevated PSA     Fibromyalgia 2013    GERD (gastroesophageal reflux disease)     Hepatitis 1970's    A    History of prostate biopsy 2002    Hyperlipidemia     Joint pain     Sleep apnea     Special screening for malignant neoplasms, colon 5/5/2014    Thyroid nodule 7/16/2014    Visual disturbance 2012    problems after cataract surgery     Past Surgical History:    Procedure Laterality Date    BACK SURGERY      CATARACT EXTRACTION W/  INTRAOCULAR LENS IMPLANT Bilateral     CHOLECYSTECTOMY      COLONOSCOPY N/A 12/17/2019    Procedure: COLONOSCOPY;  Surgeon: Amadou Hardin MD;  [facility]  Service: Endoscopy;  Laterality: N/A;  pt request to do Miralax bowel prep-BB    COSMETIC SURGERY  2/10/2015    Direct mid-forehead brow lift    COSMETIC SURGERY  2/10/2015    Bilateral upper lid blepahroplasty    ESOPHAGOGASTRODUODENOSCOPY N/A 12/17/2019    Procedure: ESOPHAGOGASTRODUODENOSCOPY (EGD);  Surgeon: Amadou Hardin MD;  Location: [facility] ENDO (4TH FLR);  Service: Endoscopy;  Laterality: N/A;    EYE SURGERY      HEMORRHOID SURGERY      with complication of chronic bleeding for 6 weeks     INJECTION OF STEROID Right 12/6/2018    Procedure: INJECTION, STEROID Right SI Joint Block and Steroid Injection;  Surgeon: Jason Caldwell MD;  Location: [facility];  Service: Neurosurgery;  Laterality: Right;  Procedure: Right SI Joint Block and Steroid Injection  Surgery Time: 30 MIN  LOS: 0  Anesthesia: MAC  Radiology: C-arm  Bed: Fitzgerald 4 Poster  Position: Prone    INJECTION OF STEROID Right 9/19/2019    Procedure: INJECTION, STEROID Procedure: Right SI joint block nd steroid injection;  Surgeon: Jason Caldwell MD;  Location: [facility];  Service: Neurosurgery;  Laterality: Right;  Procedure: Right SI joint block nd steroid injection  Surgery Time: 30 mins  LOS:   Anesthesia: General MAC  Radiology:C-arm  Bed: Regular Bed  Position: Prone    JOINT REPLACEMENT      KNEE SURGERY      involving arthroscopic surgery to both knees    SINUS SURGERY      SINUS SURGERY      left molar and sinus surgery for trigeminal neuralgia    SPINAL FUSION  6/22/2015    L3-L5 XLIF    SPINE SURGERY  6/22/15    XLIF/TANA    TOTAL HIP ARTHROPLASTY  4/2012    Pt states he had total hip replacement on his left hip.     Family History   Problem Relation Age of Onset    Cancer  Mother         colon; uterine    Nephrolithiasis Mother     Stroke Mother 86    Pancreatitis Brother     Vision loss Brother         macular degeneration    Diabetes Brother     Macular degeneration Brother     Migraines Sister     No Known Problems Father     No Known Problems Maternal Grandmother     No Known Problems Maternal Grandfather     No Known Problems Paternal Grandmother     No Known Problems Paternal Grandfather     No Known Problems Sister     No Known Problems Maternal Aunt     No Known Problems Maternal Uncle     No Known Problems Paternal Aunt     No Known Problems Paternal Uncle     Melanoma Neg Hx     Amblyopia Neg Hx     Blindness Neg Hx     Cataracts Neg Hx     Glaucoma Neg Hx     Hypertension Neg Hx     Retinal detachment Neg Hx     Strabismus Neg Hx     Thyroid disease Neg Hx     Allergic rhinitis Neg Hx     Allergies Neg Hx     Angioedema Neg Hx     Asthma Neg Hx     Eczema Neg Hx     Urticaria Neg Hx     Rhinitis Neg Hx     Immunodeficiency Neg Hx     Atopy Neg Hx      Review of Systems   Constitutional: Negative for fever.   Gastrointestinal: Positive for abdominal pain. Negative for constipation, diarrhea, nausea and vomiting.   Genitourinary: Negative for dysuria, frequency and hematuria.   Musculoskeletal: Negative for arthralgias and myalgias.   Neurological: Positive for headaches.     Objective:   There were no vitals filed for this visit.  There is no height or weight on file to calculate BMI.  Physical Exam  Assessment:     1. Abdominal discomfort    2. Anemia, unspecified type    3. Elevated PSA    4. Iron deficiency    5. Foot-drop, unspecified laterality    6. Gait abnormality    7. Calf muscle weakness      Plan:   Raffy was seen today for follow-up.    Diagnoses and all orders for this visit:    Abdominal discomfort  Comments:  Complete Augmentin    Anemia, unspecified type  Comments:  Iron deficiency, cannot tolerate iron.  Consult  hematology for iron infusion  Orders:  -     Ambulatory referral/consult to Hematology / Oncology; Future    Elevated PSA  Comments:  Urology appointment    Iron deficiency  -     Ambulatory referral/consult to Hematology / Oncology; Future    Foot-drop, unspecified laterality  -     Ambulatory referral/consult to Physical Medicine Rehab; Future    Gait abnormality  -     Ambulatory referral/consult to Physical Medicine Rehab; Future    Calf muscle weakness  -     Ambulatory referral/consult to Physical Medicine Rehab; Future        Problem List Items Addressed This Visit     None      Visit Diagnoses     Abdominal discomfort    -  Primary    Complete Augmentin    Anemia, unspecified type        Iron deficiency, cannot tolerate iron.  Consult hematology for iron infusion    Relevant Orders    Ambulatory referral/consult to Hematology / Oncology    Elevated PSA        Urology appointment    Iron deficiency        Relevant Orders    Ambulatory referral/consult to Hematology / Oncology    Foot-drop, unspecified laterality        Relevant Orders    Ambulatory referral/consult to Physical Medicine Rehab    Gait abnormality        Relevant Orders    Ambulatory referral/consult to Physical Medicine Rehab    Calf muscle weakness        Relevant Orders    Ambulatory referral/consult to Physical Medicine Rehab        Orders Placed This Encounter   Procedures    Ambulatory referral/consult to Hematology / Oncology     Standing Status:   Future     Standing Expiration Date:   6/11/2021     Referral Priority:   Routine     Referral Type:   Consultation     Referral Reason:   Specialty Services Required     Requested Specialty:   Hematology and Oncology     Number of Visits Requested:   1    Ambulatory referral/consult to Physical Medicine Rehab     Standing Status:   Future     Standing Expiration Date:   6/11/2021     Referral Priority:   Routine     Referral Type:   Rehabilitation     Referral Reason:   Specialty Services  Required     Referred to Provider:   Kevin Barrera DO     Requested Specialty:   Physical Medicine and Rehabilitation     Number of Visits Requested:   1     Follow up in about 2 weeks (around 5/25/2020) for Follow up office.     Medication List           Accurate as of May 11, 2020  5:08 PM. If you have any questions, ask your nurse or doctor.               CONTINUE taking these medications    amoxicillin-clavulanate 875-125mg 875-125 mg per tablet  Commonly known as:  AUGMENTIN  Take 1 tablet by mouth 2 (two) times daily.     butalbital-acetaminophen-caffeine -40 mg -40 mg per tablet  Commonly known as:  FIORICET, ESGIC  Take 1 tablet by mouth 6 hours as needed for Headaches.     clonazePAM 0.5 MG tablet  Commonly known as:  KLONOPIN  Take two tablets by mouth at bedtime and then one tablet by mouth as needed for tinnitus.     cyanocobalamin 1,000 mcg/mL injection     diclofenac sodium 1 % Gel  Commonly known as:  VOLTAREN  Apply 2 g topically once daily.     * EMSAM 12 mg/24 hr  Generic drug:  selegiline  Place onto the skin once daily.     * EMSAM 12 mg/24 hr  Generic drug:  selegiline  Place 1 new patch onto the skin once daily.     famotidine 20 MG tablet  Commonly known as:  PEPCID  Take 1 tablet (20 mg total) by mouth 2 (two) times daily.     HYDROcodone-acetaminophen 5-325 mg per tablet  Commonly known as:  NORCO  Take 1 tablet by mouth every 12 (twelve) hours as needed for Pain (moderate to severe).     lamoTRIgine 200 MG tablet  Commonly known as:  LAMICTAL  take 1 tablet by mouth every day     methocarbamoL 750 MG Tab  Commonly known as:  ROBAXIN     pravastatin 40 MG tablet  Commonly known as:  PRAVACHOL  Take 1 tablet (40 mg total) by mouth once daily.     * pregabalin 50 MG capsule  Commonly known as:  LYRICA  Take 1 capsule (50 mg total) by mouth 2 (two) times daily.     * pregabalin 100 MG capsule  Commonly known as:  LYRICA  Take 1 capsule (100 mg total) by mouth 3 (three)  times daily.     RABEprazole 20 mg tablet  Commonly known as:  ACIPHEX  Take 1 tablet (20 mg total) by mouth 2 (two) times daily.     senna-docusate 8.6-50 mg 8.6-50 mg per tablet  Commonly known as:  PERICOLACE  Take 2 tablets by mouth nightly as needed for Constipation.     sucralfate 1 gram tablet  Commonly known as:  CARAFATE  TAKE 1 TABLET BY MOUTH 4 TIMES A DAY     tadalafiL 5 MG tablet  Commonly known as:  CIALIS  Take 1 tablet (5 mg total) by mouth once daily.     traZODone 100 MG tablet  Commonly known as:  DESYREL  take 1 tablet by mouth every night at bedtime         * This list has 4 medication(s) that are the same as other medications prescribed for you. Read the directions carefully, and ask your doctor or other care provider to review them with you.

## 2020-05-11 ENCOUNTER — TELEPHONE (OUTPATIENT)
Dept: HEMATOLOGY/ONCOLOGY | Facility: CLINIC | Age: 70
End: 2020-05-11

## 2020-05-11 ENCOUNTER — OFFICE VISIT (OUTPATIENT)
Dept: INTERNAL MEDICINE | Facility: CLINIC | Age: 70
End: 2020-05-11
Payer: COMMERCIAL

## 2020-05-11 DIAGNOSIS — R26.9 GAIT ABNORMALITY: ICD-10-CM

## 2020-05-11 DIAGNOSIS — R97.20 ELEVATED PSA: ICD-10-CM

## 2020-05-11 DIAGNOSIS — E61.1 IRON DEFICIENCY: ICD-10-CM

## 2020-05-11 DIAGNOSIS — M62.81 CALF MUSCLE WEAKNESS: ICD-10-CM

## 2020-05-11 DIAGNOSIS — M21.379 FOOT-DROP, UNSPECIFIED LATERALITY: ICD-10-CM

## 2020-05-11 DIAGNOSIS — R10.9 ABDOMINAL DISCOMFORT: Primary | ICD-10-CM

## 2020-05-11 DIAGNOSIS — D64.9 ANEMIA, UNSPECIFIED TYPE: ICD-10-CM

## 2020-05-11 LAB — BACTERIA UR CULT: NO GROWTH

## 2020-05-11 PROCEDURE — 1159F MED LIST DOCD IN RCRD: CPT | Mod: ,,, | Performed by: INTERNAL MEDICINE

## 2020-05-11 PROCEDURE — 1101F PT FALLS ASSESS-DOCD LE1/YR: CPT | Mod: CPTII,,, | Performed by: INTERNAL MEDICINE

## 2020-05-11 PROCEDURE — 1101F PR PT FALLS ASSESS DOC 0-1 FALLS W/OUT INJ PAST YR: ICD-10-PCS | Mod: CPTII,,, | Performed by: INTERNAL MEDICINE

## 2020-05-11 PROCEDURE — 99214 PR OFFICE/OUTPT VISIT, EST, LEVL IV, 30-39 MIN: ICD-10-PCS | Mod: 95,,, | Performed by: INTERNAL MEDICINE

## 2020-05-11 PROCEDURE — 99214 OFFICE O/P EST MOD 30 MIN: CPT | Mod: 95,,, | Performed by: INTERNAL MEDICINE

## 2020-05-11 PROCEDURE — 1159F PR MEDICATION LIST DOCUMENTED IN MEDICAL RECORD: ICD-10-PCS | Mod: ,,, | Performed by: INTERNAL MEDICINE

## 2020-05-13 ENCOUNTER — PATIENT MESSAGE (OUTPATIENT)
Dept: NEUROLOGY | Facility: CLINIC | Age: 70
End: 2020-05-13

## 2020-05-14 RX ORDER — UBROGEPANT 50 MG/1
50 TABLET ORAL
COMMUNITY
Start: 2020-03-08 | End: 2020-05-14 | Stop reason: SDUPTHER

## 2020-05-15 ENCOUNTER — OFFICE VISIT (OUTPATIENT)
Dept: UROLOGY | Facility: CLINIC | Age: 70
End: 2020-05-15
Payer: COMMERCIAL

## 2020-05-15 ENCOUNTER — TELEPHONE (OUTPATIENT)
Dept: PHYSICAL MEDICINE AND REHAB | Facility: CLINIC | Age: 70
End: 2020-05-15

## 2020-05-15 ENCOUNTER — TELEPHONE (OUTPATIENT)
Dept: HEMATOLOGY/ONCOLOGY | Facility: CLINIC | Age: 70
End: 2020-05-15

## 2020-05-15 VITALS
HEIGHT: 68 IN | DIASTOLIC BLOOD PRESSURE: 64 MMHG | WEIGHT: 174.19 LBS | HEART RATE: 66 BPM | BODY MASS INDEX: 26.4 KG/M2 | SYSTOLIC BLOOD PRESSURE: 104 MMHG

## 2020-05-15 DIAGNOSIS — D50.9 IRON DEFICIENCY ANEMIA, UNSPECIFIED IRON DEFICIENCY ANEMIA TYPE: ICD-10-CM

## 2020-05-15 DIAGNOSIS — N40.1 BPH WITH URINARY OBSTRUCTION: ICD-10-CM

## 2020-05-15 DIAGNOSIS — R97.20 ELEVATED PSA: Primary | ICD-10-CM

## 2020-05-15 DIAGNOSIS — N13.8 BPH WITH URINARY OBSTRUCTION: ICD-10-CM

## 2020-05-15 PROCEDURE — 1101F PR PT FALLS ASSESS DOC 0-1 FALLS W/OUT INJ PAST YR: ICD-10-PCS | Mod: CPTII,S$GLB,, | Performed by: NURSE PRACTITIONER

## 2020-05-15 PROCEDURE — 99214 OFFICE O/P EST MOD 30 MIN: CPT | Mod: S$GLB,,, | Performed by: NURSE PRACTITIONER

## 2020-05-15 PROCEDURE — 99999 PR PBB SHADOW E&M-EST. PATIENT-LVL V: ICD-10-PCS | Mod: PBBFAC,,, | Performed by: NURSE PRACTITIONER

## 2020-05-15 PROCEDURE — 99999 PR PBB SHADOW E&M-EST. PATIENT-LVL V: CPT | Mod: PBBFAC,,, | Performed by: NURSE PRACTITIONER

## 2020-05-15 PROCEDURE — 1126F PR PAIN SEVERITY QUANTIFIED, NO PAIN PRESENT: ICD-10-PCS | Mod: S$GLB,,, | Performed by: NURSE PRACTITIONER

## 2020-05-15 PROCEDURE — 1126F AMNT PAIN NOTED NONE PRSNT: CPT | Mod: S$GLB,,, | Performed by: NURSE PRACTITIONER

## 2020-05-15 PROCEDURE — 1159F MED LIST DOCD IN RCRD: CPT | Mod: S$GLB,,, | Performed by: NURSE PRACTITIONER

## 2020-05-15 PROCEDURE — 1101F PT FALLS ASSESS-DOCD LE1/YR: CPT | Mod: CPTII,S$GLB,, | Performed by: NURSE PRACTITIONER

## 2020-05-15 PROCEDURE — 99214 PR OFFICE/OUTPT VISIT, EST, LEVL IV, 30-39 MIN: ICD-10-PCS | Mod: S$GLB,,, | Performed by: NURSE PRACTITIONER

## 2020-05-15 PROCEDURE — 1159F PR MEDICATION LIST DOCUMENTED IN MEDICAL RECORD: ICD-10-PCS | Mod: S$GLB,,, | Performed by: NURSE PRACTITIONER

## 2020-05-15 NOTE — TELEPHONE ENCOUNTER
----- Message from Valarie Rutherford, CNS sent at 5/15/2020 11:07 AM CDT -----  Hello,I am writing on behalf of my , I am a provider at [facility] and 35 yr employee of this place.I am writing on behalf of my    . He has had many back and back surgery related issues and seen many providers over the years.  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   He has a need  for a PMR provider as does Dr Rendon.   I also think he is at point of needing custom AFO's  He has a standard brace but not able to tolerate for long.   I am writing as I hope your [facility] is able to take him on for consult, weigh in on his needs.   . A formal consult was put in by [facility] to [facility] but he is only doing EMG currently I am told     Please let him or me know if you can see him and think you are the appropriate provider to do so. IF you feel someone else in PMR is better fit please feel free to let me know that.as his condition seems bit worse with no therapy I feel time is of essence     Thank you for your attention.     Valarie Rutherford  [phone]

## 2020-05-15 NOTE — TELEPHONE ENCOUNTER
Each doctors has there own list of Orthotic company they prefer. This is discussed during the patient visit.   [employer] @ [phone] is one of them  Thank you.    ----- Message from JIMMIE Singer sent at 5/15/2020 11:45 AM CDT -----  He has an appt with Mac in [place] but I would still like to know from  who is orthotic company they like to use and deal with , thanks   ----- Message -----  From: Tenisha Jacob MA  Sent: 5/15/2020  11:17 AM CDT  To: JIMMIE Singer    Good morning, Dr Lopez is out of the office today.  I can send your message to him on Monday once he returns if you like.  His next available appointment is Sep 15 @ 8:00.  We have other providers on the same team that possible can help you as well with a sooner appointment.  They are located @ the [place] location @ [phone].  Thank you.  ----- Message -----  From: JIMMIE Singer  Sent: 5/15/2020  11:07 AM CDT  To: Lyubov Lopez MD, #    Hello,I am writing on behalf of my , I am a provider at [facility] and 35 yr employee of this place.I am writing on behalf of my    . He has had many back and back surgery related issues and seen many providers over the years.  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   He has a need  for a PMR provider as does Dr Rendon.   I also think he is at point of needing custom AFO's  He has a standard brace but not able to tolerate for long.   I am writing as I hope your [facility] is able to take him on for consult, weigh in on his needs.   . A formal consult was put in by [facility] to [facility] but he is only doing EMG currently I am told     Please let him or me know if you can see him and think you are the appropriate provider to do so. IF you feel someone else in PMR is better fit please feel free to let me know that.as his condition seems bit worse  with no therapy I feel time is of essence     Thank you for your attention.     Valarie Rutherford  [phone]

## 2020-05-15 NOTE — PROGRESS NOTES
Subjective:       Patient ID: Raffy Rutherford Jr. is a 70 y.o. male.    Chief Complaint: Elevated PSA    Raffy Rutherford Jr. is a 70 y.o. Male with history of BPH, prostatitis and elevated PSA  Unable to tolerate alpha blockers.  (-) Trus/bx 1/17/2011 with Dr. El. PSA was 5.3 at the time.  02/22/2017 MRI of the Prostate when the PSA was 7.2  -Peripheral Zone: PI RADS grade 2  -Transitional Zone: PI RADS grade 2  -Prostate size 36 ml    Last clinic visit 01/06/2020  He today for annual exam;  He is reporting occasional weak FOS; some urgency but not all the time.  Nocturia 1x.     Here today due to elevated PSA noted on his PCP's lab work.    He denies any changes with urination.  Recent UA showed no infection.    He did reports some GI discomfort from the cialis.     10/25/2019 s/p lumbar laminectomy/decompression.   He was doinng well with swim PT but stopped 2/2 Covid 19.  He has new leg braces in place.   Walking more; improving activity.             PSA                  8.0 (H)             05/07/2020                 PSA                  6.3 (H)             10/31/2019                 PSA                  5.6 (H)             10/15/2018                 PSA                  5.3 (H)             01/25/2018            PSA                  7.2 (H)             02/08/2017     (-) MRI: PiRads 2            PSA                  5.9 (H)             01/23/2017                 PSA                  6.1 (H)             01/16/2017             PSA                  4.3*                04/28/2015                 PSA                  4.6*                02/25/2015                 PSA                      4.5*                02/21/2014                 PSA                      4.42*               05/06/2013                 PSA                      5.47*               10/04/2012                 PSA                      5.3*                01/03/2011       (-) TRUS bx       PSA                      4.2*                09/27/2010                  PSA                      5.2*                08/23/2010                 PSA                      3.4                 07/21/2010                 PSA                      3.9                 05/10/2010                 PSA                      3.4                 04/22/2009                 PSA                      2.1                 10/16/2007                                Past Medical History:    Depression                                                    Hyperlipidemia                                                Chronic pain                                                    Comment:neck and left shoulder    Colon polyp                                                   Adenomatous polyp                               2003          History of prostate biopsy                      2002          GERD (gastroesophageal reflux disease)                        Back pain                                                       Comment:after trauma beginning in 89    Sleep apnea                                                   Allergy                                                       Arthritis                                                     Joint pain                                                    Hepatitis                                       1970's          Comment:A    Visual disturbance                              2012            Comment:problems after cataract surgery    Degenerative disc disease                                     Fibromyalgia                                    2013          Cluster headache                                2013          Diverticulitis                                  12/2013       Thyroid nodule                                  7/16/2014     Special screening for malignant neoplasms, col* 5/5/2014      Adenomatous polyp                                             Past Surgical History:    SINUS SURGERY                                                  KNEE SURGERY                                                      Comment:involving arthroscopic surgery to both knees    HEMORRHOID SURGERY                                               Comment:with complication of chronic bleeding for 6                weeks     CHOLECYSTECTOMY                                                SINUS SURGERY                                                    Comment:left molar and sinus surgery for trigeminal                neuralgia    EYE SURGERY                                                    BACK SURGERY                                                   JOINT REPLACEMENT                                              TOTAL HIP ARTHROPLASTY                           4/2012          Comment:Pt states he had total hip replacement on his                left hip.    COSMETIC SURGERY                                 2/10/2015       Comment:Direct mid-forehead brow lift    COSMETIC SURGERY                                 2/10/2015       Comment:Bilateral upper lid blepahroplasty    SPINE SURGERY                                    6/22/15         Comment:XLIF/TANA    CATARACT EXTRACTION W/  INTRAOCULAR LENS IMPLA* Bilateral               SPINAL FUSION                                    6/22/2015       Comment:L3-L5 XLIF    Review of patient's family history indicates:    Cancer                         Mother                      Comment: colon; uterine    Nephrolithiasis                Mother                    Stroke                         Mother                    Pancreatitis                   Brother                   Vision loss                    Brother                     Comment: macular degeneration    Diabetes                       Brother                   Macular degeneration           Brother                   Melanoma                       Neg Hx                    Amblyopia                      Neg Hx                    Blindness                      Neg Hx                     Cataracts                      Neg Hx                    Glaucoma                       Neg Hx                    Hypertension                   Neg Hx                    Retinal detachment             Neg Hx                    Strabismus                     Neg Hx                    Thyroid disease                Neg Hx                    Migraines                      Sister                    No Known Problems              Father                    No Known Problems              Maternal Grandmother      No Known Problems              Maternal Grandfather      No Known Problems              Paternal Grandmother      No Known Problems              Paternal Grandfather      No Known Problems              Sister                    No Known Problems              Maternal Aunt             No Known Problems              Maternal Uncle            No Known Problems              Paternal Aunt             No Known Problems              Paternal Uncle              Social History    Marital Status:              Spouse Name:                       Years of Education:                 Number of children:               Occupational History  Occupation          Employer            Comment               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*                         Social History Main Topics    Smoking Status: Never Smoker                      Smokeless Status: Never Used                        Alcohol Use: Yes                Comment: occasion    Drug Use: No              Sexual Activity: Yes               Partners with: Female    Other Topics            Concern    None on file    Social History Narrative    None on file        Allergies:  Alphagan; Coumadin; and Oxycodone    Medications:  Current outpatient prescriptions: alendronate (FOSAMAX) 70 MG tablet, Take 1 tablet (70 mg total) by mouth every 7 days., Disp: 12 tablet, Rfl: 3;  clonazePAM (KLONOPIN) 1 MG tablet, TAKE 2 TABLETS AT BEDTIME. (Patient taking differently: TAKE 2 TABLETS AT  BEDTIME. patient states taking 1.5), Disp: 180 tablet, Rfl: 1;  desoximetasone 0.05 % Gel, Apply to affected area 2 times a day as needed, Disp: , Rfl: 1  ergocalciferol (VITAMIN D2) 50,000 unit Cap, TAKE 1 CAPSULE (50,000 UNITS TOTAL) BY MOUTH TWICE A WEEK., Disp: 24 capsule, Rfl: 3;  fish oil-omega-3 fatty acids 300-1,000 mg capsule, Take 2 g by mouth once daily., Disp: , Rfl: ;  (START ON 12/22/2015) hydrocodone-acetaminophen 10-325mg (NORCO)  mg Tab, Take 1 tablet by mouth 3 (three) times daily as needed., Disp: 90 tablet, Rfl: 0  hydrocortisone-pramoxine (ANALPRAM-HC) 2.5-1 % Crea, 2.5 mg., Disp: , Rfl: ;  ketoconazole (NIZORAL) 2 % cream, Apply to affected area 2 times a day for 2 weeks, Disp: , Rfl: 1;  lamotrigine (LAMICTAL) 200 MG tablet, One and one/half daily (Patient taking differently: Take 150 mg by mouth once daily. One and one/half daily), Disp: 135 tablet, Rfl: 3  magnesium oxide (MAG-OX) 400 mg tablet, Take 1 tablet (400 mg total) by mouth 2 (two) times daily., Disp: 60 tablet, Rfl: 12;  meclizine (ANTIVERT) 25 mg tablet, Take 1 tablet (25 mg total) by mouth every 6 (six) hours. Prn vertigo (Patient taking differently: Take 25 mg by mouth every 6 (six) hours as needed. Prn vertigo), Disp: 25 tablet, Rfl: 1;  methylPREDNISolone (MEDROL DOSEPACK) 4 mg tablet, use as directed, Disp: 1 Package, Rfl: 0  pilocarpine HCL 1% (PILOCAR) 1 % ophthalmic solution, Place 1 drop into both eyes once as needed. , Disp: , Rfl: ;  pravastatin (PRAVACHOL) 40 MG tablet, TAKE 1 TABLET (40 MG TOTAL) BY MOUTH EVERY EVENING., Disp: 30 tablet, Rfl: 5;  rabeprazole (ACIPHEX) 20 mg tablet, Take 2 tablets (40 mg total) by mouth once daily., Disp: 180 tablet, Rfl: 3;  selegiline (EMSAM) 12 mg/24 hr, Place 1 patch onto the skin once daily., Disp: 90 patch, Rfl: 3  senna-docusate 8.6-50 mg (PERICOLACE) 8.6-50 mg per tablet, Take 2 tablets by mouth nightly as needed for Constipation., Disp: , Rfl: ;  trazodone (DESYREL) 100  MG tablet, 1 and 1/2 talbet daily (Patient taking differently: every evening. 1 and 1/2 talbet daily), Disp: 135 tablet, Rfl: 3;  UBIDECARENONE (CO Q-10 ORAL), Take 1 capsule by mouth once daily. , Disp: , Rfl:   UNABLE TO FIND, medication name: cefaly + electrodes, wear 20-30 mins, Dx: migraine, Disp: 1 Units, Rfl: 0  Current facility-administered medications: gentamicin injection 160 mg, 160 mg, Intramuscular, Q12H, Usha Gonzales, LINDA, 160 mg at 12/17/15 1504              Review of Systems   Constitutional: Negative for activity change, appetite change, chills and fever.   HENT: Negative for facial swelling and trouble swallowing.    Eyes: Negative for visual disturbance.   Respiratory: Negative for chest tightness and shortness of breath.    Cardiovascular: Negative for chest pain and palpitations.   Gastrointestinal: Negative.  Negative for abdominal pain, constipation, diarrhea, nausea and vomiting.   Genitourinary: Positive for frequency and nocturia. Negative for difficulty urinating, dysuria, flank pain, hematuria, penile pain, penile swelling, scrotal swelling and testicular pain.        Ok with urination.     Musculoskeletal: Positive for back pain and myalgias. Negative for gait problem and neck stiffness.   Skin: Negative for rash.   Neurological: Negative for dizziness and speech difficulty.   Hematological: Does not bruise/bleed easily.   Psychiatric/Behavioral: Negative for behavioral problems.       Objective:      Physical Exam   Nursing note and vitals reviewed.  Constitutional: He is oriented to person, place, and time. Vital signs are normal. He appears well-developed and well-nourished. He is active and cooperative.  Non-toxic appearance. He does not have a sickly appearance.   HENT:   Head: Normocephalic and atraumatic.   Right Ear: External ear normal.   Left Ear: External ear normal.   Nose: Nose normal.   Mouth/Throat: Mucous membranes are normal.   Eyes: Conjunctivae and lids are normal.  No scleral icterus.   Neck: Trachea normal, normal range of motion and full passive range of motion without pain. Neck supple. No JVD present. No tracheal deviation present.   Cardiovascular: Normal rate, S1 normal and S2 normal.    Pulmonary/Chest: Effort normal. No respiratory distress. He exhibits no tenderness.   Abdominal: Soft. Normal appearance. There is no hepatosplenomegaly. There is no tenderness. There is no CVA tenderness.   Genitourinary: Rectum normal, testes normal and penis normal. Rectal exam shows no external hemorrhoid, no mass and no tenderness. Prostate is enlarged. Prostate is not tender.       Musculoskeletal: Normal range of motion.   Neurological: He is alert and oriented to person, place, and time. He has normal strength.   Skin: Skin is warm, dry and intact.     Psychiatric: He has a normal mood and affect. His behavior is normal. Judgment and thought content normal.       Assessment:       1. Elevated PSA    2. BPH with urinary obstruction        Plan:         I spent 25 minutes with the patient of which more than half was spent in direct consultation with the patient in regards to our treatment and plan.    Education and recommendations of today's plan of care including home remedies.  We discussed his LUTS/PSA results;  Discussed the contributory factors for the elevation.  UA is good.  We going to repeat PSA; instructed on ejaculation 3 days prior;   If PSA remains elevated, then can do MRI and proceed accordingly.

## 2020-05-15 NOTE — TELEPHONE ENCOUNTER
The number given to you  @ [phone] was Dr Mac and Dr Barrera office they are  in the same office.  The Doctor may have there own list of PRAFO /AFO they send there patient to.  Let me know if I can be any other assistance. Thank you    ----- Message from JIMMIE Singer sent at 5/15/2020 11:34 AM CDT -----  Oh my gosh that is crazy September !!!!  Is it possible to get appt with Dr Mac sooner , he was also recommended by Dr Barrera who I spoke with first .   Please let me know  Sept is really not acceptable with the falling behind in therapy and walking issues. I am trying to keep his maximally functional as long as possible,  Also ccan you tell me who you all refer pt to for custom PRAFO /AFO   Thanks   ----- Message -----  From: Tenisha Jacob MA  Sent: 5/15/2020  11:17 AM CDT  To: JIMMIE Singer    Good morning, Dr Lopez is out of the office today.  I can send your message to him on Monday once he returns if you like.  His next available appointment is Sep 15 @ 8:00.  We have other providers on the same team that possible can help you as well with a sooner appointment.  They are located @ the [place] location @ [phone].  Thank you.  ----- Message -----  From: JIMMIE Singer  Sent: 5/15/2020  11:07 AM CDT  To: Lyubov Lopez MD, #    Hello,I am writing on behalf of my , I am a provider at [facility] and 35 yr employee of this place.I am writing on behalf of my    . He has had many back and back surgery related issues and seen many providers over the years.  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   He has a need  for a PMR provider as does Dr Rendon.   I also think he is at point of needing custom AFO's  He has a standard brace but not able to tolerate for long.   I am writing as I hope your miguel a is able to take him  on for consult, weigh in on his needs.   . A formal consult was put in by Justice to [facility] but he is only doing EMG currently I am told     Please let him or me know if you can see him and think you are the appropriate provider to do so. IF you feel someone else in PMR is better fit please feel free to let me know that.as his condition seems bit worse with no therapy I feel time is of essence     Thank you for your attention.     Valarie Rutherford  [id number]

## 2020-05-15 NOTE — TELEPHONE ENCOUNTER
----- Message from Pardeep Melgar RN sent at 5/15/2020 10:15 AM CDT -----  See Dr. Bianchi's text from today

## 2020-05-15 NOTE — LETTER
May 15, 2020      Nini Rendon MD  [address]  [facility] [address]           [facility] 4th Floor  [address]  [facility] [address]  Phone: [phone]          Patient: Raffy Rutherford Jr.   MR Number: [id number]   YOB: 1950   Date of Visit: 5/15/2020       Dear Dr. Nini Rendon:    Thank you for referring Raffy Rutherford to me for evaluation. Attached you will find relevant portions of my assessment and plan of care.    If you have questions, please do not hesitate to call me. I look forward to following Raffy Rutherford along with you.    Sincerely,    Usha Gonzales, NP    Enclosure  CC:  No Recipients    If you would like to receive this communication electronically, please contact [email or web address] or [phone] to request more information on [employer] access.    For providers and/or their staff who would like to refer a patient to [facility], please contact us through our one-stop-shop provider referral line, [facility], at [phone].    If you feel you have received this communication in error or would no longer like to receive these types of communications, please e-mail [email or web address]

## 2020-05-18 ENCOUNTER — PATIENT MESSAGE (OUTPATIENT)
Dept: PHYSICAL MEDICINE AND REHAB | Facility: CLINIC | Age: 70
End: 2020-05-18

## 2020-05-23 NOTE — PROGRESS NOTES
Subjective:      Patient ID: Raffy Rutherford Jr. is a 70 y.o. male.    Chief Complaint: No chief complaint on file.    HPI:  HPI     The patient location is: home  The chief complaint leading to consultation is: elevated PSA, anemia and abdominal bloating  Visit type: audiovisual  Total time spent with patient: 15 discussed response to antibiotics  Each patient to whom he or she provides medical services by telemedicine is:  (1) informed of the relationship between the physician and patient and the respective role of any other health care provider with respect to management of the patient; and (2) notified that he or she may decline to receive medical services by telemedicine and may withdraw from such care at any time.    Notes:  5/26/2020   Patient has completed Augmentin, notes abdominal discomfort is better.  Although we did not clearly get a diagnosis from the CT scan he does seem to have responded.  I would suspect a mild case of diverticulitis  .  Anemia:  Patient has been seen in Hematology and has received 1 iron infusion.  He was unable to take oral iron and remained anemic for significant amount of time post surgery    .  Urology consult completed and reviewed follow-up PSA has been ordered      Last appt  CT Scan: negative for diverticulitis: discussed based on exam could try Augment.    Anemia: further studies ordered and appears Iron deficient    Elevated PSA: patient should make Urology appt  Patient Active Problem List   Diagnosis    Depression    Hyperlipidemia    Chronic pain    Colon polyp    Adenomatous polyp    History of prostate biopsy    GERD (gastroesophageal reflux disease)    Back pain    Sleep apnea    Visual disturbances    Spondylosis without myelopathy    Degeneration of lumbar or lumbosacral intervertebral disc    Spinal stenosis, lumbar region, without neurogenic claudication    Thoracic or lumbosacral neuritis or radiculitis, unspecified    Displacement of lumbar  intervertebral disc without myelopathy    Acquired spondylolisthesis    Lumbago    Status post cataract extraction and insertion of intraocular lens - Both Eyes    Prostatitis, acute    Fibromyalgia    OP (osteoporosis)    Compression fracture of T12 vertebra    Diverticulitis large intestine    Osteoarthritis    Lower back pain    Lower extremity pain    Muscle weakness    Range of motion deficit    Special screening for malignant neoplasms, colon    Cervicalgia    Facet joint disease of cervical region    Occipital neuralgia    Chronic migraine without aura, with intractable migraine, so stated, with status migrainosus    Cervical radiculopathy    Paroxysmal hemicrania    Sciatic nerve pain    Chronic LBP    DJD (degenerative joint disease) of lumbar spine    Chronic neck pain    Right lumbar radiculopathy    Thyroid nodule    Carpal tunnel syndrome of right wrist    Paresthesia    Brow ptosis    Degenerative disc disease    Encounter for postoperative wound check    Lumbar radiculopathy    Cervical spondylosis    S/P lumbar spinal fusion    Right wrist tendinitis    Pain in right wrist    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    Headache around the eyes    Closed fracture of left distal radius    Pain in left wrist    Bilateral foot-drop    Idiopathic peripheral neuropathy    Sacroiliac joint dysfunction of right side    SI (sacroiliac) joint dysfunction    Episodic migraine without status migrainosus, not intractable    Lumbar disc herniation with radiculopathy    Anxiety about health    MDD (major depressive disorder), recurrent episode, moderate    Anxiety    Weakness of both lower extremities    History of colon polyps    Iron deficiency anemia    Sicca syndrome with keratoconjunctivitis     Past Medical History:   Diagnosis Date    Adenomatous polyp 2003    Adenomatous polyp     Allergy     Arthritis     Back pain     after trauma  beginning in 195    Cataract     Chronic pain     neck and left shoulder    Cluster headache 2013    Colon polyp     Degenerative disc disease     Depression     Diverticulitis 12/2013    Elevated PSA     Fibromyalgia 2013    GERD (gastroesophageal reflux disease)     Hepatitis 1970's    A    History of prostate biopsy 2002    Hyperlipidemia     Joint pain     Sleep apnea     Special screening for malignant neoplasms, colon 5/5/2014    Thyroid nodule 7/16/2014    Visual disturbance 2012    problems after cataract surgery     Past Surgical History:   Procedure Laterality Date    BACK SURGERY      CATARACT EXTRACTION W/  INTRAOCULAR LENS IMPLANT Bilateral     CHOLECYSTECTOMY      COLONOSCOPY N/A 12/17/2019    Procedure: COLONOSCOPY;  Surgeon: Amadou Hardin MD;  [facility]  Service: Endoscopy;  Laterality: N/A;  pt request to do Miralax bowel prep-BB    COSMETIC SURGERY  2/10/2015    Direct mid-forehead brow lift    COSMETIC SURGERY  2/10/2015    Bilateral upper lid blepahroplasty    ESOPHAGOGASTRODUODENOSCOPY N/A 12/17/2019    Procedure: ESOPHAGOGASTRODUODENOSCOPY (EGD);  Surgeon: Amadou Hardin MD;  Location: [facility] ([address] [place] [address]);  Service: Endoscopy;  Laterality: N/A;    EYE SURGERY      HEMORRHOID SURGERY      with complication of chronic bleeding for 6 weeks     INJECTION OF STEROID Right 12/6/2018    Procedure: INJECTION, STEROID Right SI Joint Block and Steroid Injection;  Surgeon: Jason Caldwell MD;  Location: [facility] OR;  Service: Neurosurgery;  Laterality: Right;  Procedure: Right SI Joint Block and Steroid Injection  Surgery Time: 30 MIN  LOS: 0  Anesthesia: MAC  Radiology: C-arm  Bed: [place] [id number] Poster  Position: Prone    INJECTION OF STEROID Right 9/19/2019    Procedure: INJECTION, STEROID Procedure: Right SI joint block nd steroid injection;  Surgeon: Jason Caldwell MD;  Location: [facility] OR;  Service: Neurosurgery;  Laterality: Right;  Procedure: Right SI  joint block nd steroid injection  Surgery Time: 30 mins  LOS:   Anesthesia: General MAC  Radiology:C-arm  Bed: Regular Bed  Position: Prone    JOINT REPLACEMENT      KNEE SURGERY      involving arthroscopic surgery to both knees    SINUS SURGERY      SINUS SURGERY      left molar and sinus surgery for trigeminal neuralgia    SPINAL FUSION  6/22/2015    L3-L5 XLIF    SPINE SURGERY  6/22/15    XLIF/TANA    TOTAL HIP ARTHROPLASTY  4/2012    Pt states he had total hip replacement on his left hip.     Family History   Problem Relation Age of Onset    Cancer Mother         colon; uterine    Nephrolithiasis Mother     Stroke Mother 86    Pancreatitis Brother     Vision loss Brother         macular degeneration    Diabetes Brother     Macular degeneration Brother     Migraines Sister     No Known Problems Father     No Known Problems Maternal Grandmother     No Known Problems Maternal Grandfather     No Known Problems Paternal Grandmother     No Known Problems Paternal Grandfather     No Known Problems Sister     No Known Problems Maternal Aunt     No Known Problems Maternal Uncle     No Known Problems Paternal Aunt     No Known Problems Paternal Uncle     Melanoma Neg Hx     Amblyopia Neg Hx     Blindness Neg Hx     Cataracts Neg Hx     Glaucoma Neg Hx     Hypertension Neg Hx     Retinal detachment Neg Hx     Strabismus Neg Hx     Thyroid disease Neg Hx     Allergic rhinitis Neg Hx     Allergies Neg Hx     Angioedema Neg Hx     Asthma Neg Hx     Eczema Neg Hx     Urticaria Neg Hx     Rhinitis Neg Hx     Immunodeficiency Neg Hx     Atopy Neg Hx      Review of Systems   Constitutional: Negative for fever.   Gastrointestinal: Positive for abdominal pain. Negative for constipation, diarrhea, nausea and vomiting.   Genitourinary: Negative for dysuria, frequency and hematuria.   Musculoskeletal: Negative for arthralgias and myalgias.   Neurological: Positive for headaches.      Objective:   There were no vitals filed for this visit.  There is no height or weight on file to calculate BMI.  Physical Exam   Reports no additional findings  Assessment:     1. Abdominal pain, right lower quadrant    2. Iron deficiency    3. Abnormal PSA    4. Sicca syndrome with keratoconjunctivitis    5. MDD (major depressive disorder), recurrent episode, moderate      Plan:   Diagnoses and all orders for this visit:    Abdominal pain, right lower quadrant  Comments:  Likely to be more centralized but this was not an option on the codes.  Seems to have improved with Augmentin    Iron deficiency  Comments:  Patient is scheduled for iron infusions tolerated the 1st 1    Abnormal PSA  Comments:  Reviewed urology notes    Sicca syndrome with keratoconjunctivitis  Comments:  Followed in Ophthalmology    MDD (major depressive disorder), recurrent episode, moderate  Comments:  Followed by psychiatry        Problem List Items Addressed This Visit     MDD (major depressive disorder), recurrent episode, moderate    Sicca syndrome with keratoconjunctivitis      Other Visit Diagnoses     Abdominal pain, right lower quadrant    -  Primary    Likely to be more centralized but this was not an option on the codes.  Seems to have improved with Augmentin    Iron deficiency        Patient is scheduled for iron infusions tolerated the 1st 1    Abnormal PSA        Reviewed urology notes        No orders of the defined types were placed in this encounter.    No follow-ups on file.     Medication List           Accurate as of May 26, 2020 11:59 PM. If you have any questions, ask your nurse or doctor.               CONTINUE taking these medications    amoxicillin-clavulanate 875-125mg 875-125 mg per tablet  Commonly known as:  AUGMENTIN  Take 1 tablet by mouth 2 (two) times daily.     butalbital-acetaminophen-caffeine -40 mg -40 mg per tablet  Commonly known as:  FIORICET, ESGIC  Take 1 tablet by mouth 6 hours as  needed for Headaches.     clonazePAM 0.5 MG tablet  Commonly known as:  KLONOPIN  Take two tablets by mouth at bedtime and then one tablet by mouth as needed for tinnitus.     cyanocobalamin 1,000 mcg/mL injection     diclofenac sodium 1 % Gel  Commonly known as:  VOLTAREN  Apply 2 g topically once daily.     EMSAM 12 mg/24 hr  Generic drug:  selegiline  Place 1 new patch onto the skin once daily.     famotidine 20 MG tablet  Commonly known as:  PEPCID  Take 1 tablet (20 mg total) by mouth 2 (two) times daily.     HYDROcodone-acetaminophen 5-325 mg per tablet  Commonly known as:  NORCO  Take 1 tablet by mouth every 12 (twelve) hours as needed for Pain (moderate to severe).     lamoTRIgine 200 MG tablet  Commonly known as:  LAMICTAL  take 1 tablet by mouth every day     methocarbamoL 750 MG Tab  Commonly known as:  ROBAXIN     pravastatin 40 MG tablet  Commonly known as:  PRAVACHOL  Take 1 tablet (40 mg total) by mouth once daily.     * pregabalin 50 MG capsule  Commonly known as:  LYRICA  Take 1 capsule (50 mg total) by mouth 2 (two) times daily.     * pregabalin 100 MG capsule  Commonly known as:  LYRICA  Take 1 capsule (100 mg total) by mouth 3 (three) times daily.     RABEprazole 20 mg tablet  Commonly known as:  ACIPHEX  Take 1 tablet (20 mg total) by mouth 2 (two) times daily.     senna-docusate 8.6-50 mg 8.6-50 mg per tablet  Commonly known as:  PERICOLACE  Take 2 tablets by mouth nightly as needed for Constipation.     sucralfate 1 gram tablet  Commonly known as:  CARAFATE  TAKE 1 TABLET BY MOUTH 4 TIMES A DAY     tadalafiL 5 MG tablet  Commonly known as:  CIALIS  Take 1 tablet (5 mg total) by mouth once daily.     traZODone 100 MG tablet  Commonly known as:  DESYREL  take 1 tablet by mouth every night at bedtime     ubrogepant 50 mg tablet  Generic drug:  ubrogepant         * This list has 2 medication(s) that are the same as other medications prescribed for you. Read the directions carefully, and ask your  doctor or other care provider to review them with you.

## 2020-05-25 ENCOUNTER — CLINICAL SUPPORT (OUTPATIENT)
Dept: HEMATOLOGY/ONCOLOGY | Facility: CLINIC | Age: 70
End: 2020-05-25
Payer: COMMERCIAL

## 2020-05-25 ENCOUNTER — TELEPHONE (OUTPATIENT)
Dept: HEMATOLOGY/ONCOLOGY | Facility: CLINIC | Age: 70
End: 2020-05-25

## 2020-05-25 ENCOUNTER — PATIENT MESSAGE (OUTPATIENT)
Dept: INTERNAL MEDICINE | Facility: CLINIC | Age: 70
End: 2020-05-25

## 2020-05-25 DIAGNOSIS — Z13.9 ENCOUNTER FOR SCREENING: ICD-10-CM

## 2020-05-25 DIAGNOSIS — Z51.11 ENCOUNTER FOR CHEMOTHERAPY MANAGEMENT: ICD-10-CM

## 2020-05-25 DIAGNOSIS — D50.9 IRON DEFICIENCY ANEMIA, UNSPECIFIED IRON DEFICIENCY ANEMIA TYPE: Primary | ICD-10-CM

## 2020-05-25 DIAGNOSIS — Z13.9 ENCOUNTER FOR SCREENING: Primary | ICD-10-CM

## 2020-05-25 LAB — SARS-COV-2 RDRP RESP QL NAA+PROBE: NEGATIVE

## 2020-05-25 PROCEDURE — U0002 COVID-19 LAB TEST NON-CDC: HCPCS

## 2020-05-25 NOTE — TELEPHONE ENCOUNTER
----- Message from Dianne Emerson sent at 5/25/2020 10:30 AM CDT -----  Contact: Pt  I didn't call Maybe Kimberlee did?  I didn't see a phone note.    ----- Message -----  From: Pardeep Melgar RN  Sent: 5/25/2020  10:10 AM CDT  To: Dianne Emerson    Did you call patient earlier? I did have not reached out to him today  ----- Message -----  From: Todd Hardin  Sent: 5/25/2020  10:09 AM CDT  To: Tash Brooks Staff    Pt missed a call and would like the nurse to return their call.    Pt can be reached at [phone].    Thanks

## 2020-05-25 NOTE — TELEPHONE ENCOUNTER
----- Message from Dianne Emerson sent at 5/25/2020  9:23 AM CDT -----  Regarding: FW: COVID   Can patient do covid test today?    ----- Message -----  From: Pardeep Melgar RN  Sent: 5/25/2020   9:08 AM CDT  To: Dianne Emerson  Subject: RE: COVID                                        covid order in  ----- Message -----  From: Dianne Emerson  Sent: 5/25/2020   8:56 AM CDT  To: Tash Villegas Staff  Subject: COVID                                            Patient needs a COVID test. Patient is scheduled for Tuesday 5/26.    Need order placed.

## 2020-05-26 ENCOUNTER — OFFICE VISIT (OUTPATIENT)
Dept: INTERNAL MEDICINE | Facility: CLINIC | Age: 70
End: 2020-05-26
Payer: COMMERCIAL

## 2020-05-26 ENCOUNTER — INFUSION (OUTPATIENT)
Dept: INFUSION THERAPY | Facility: HOSPITAL | Age: 70
End: 2020-05-26
Attending: INTERNAL MEDICINE
Payer: COMMERCIAL

## 2020-05-26 VITALS
RESPIRATION RATE: 18 BRPM | TEMPERATURE: 99 F | HEART RATE: 59 BPM | DIASTOLIC BLOOD PRESSURE: 64 MMHG | SYSTOLIC BLOOD PRESSURE: 109 MMHG

## 2020-05-26 DIAGNOSIS — D50.9 IRON DEFICIENCY ANEMIA, UNSPECIFIED IRON DEFICIENCY ANEMIA TYPE: Primary | ICD-10-CM

## 2020-05-26 DIAGNOSIS — M35.01 SICCA SYNDROME WITH KERATOCONJUNCTIVITIS: ICD-10-CM

## 2020-05-26 DIAGNOSIS — R97.20 ABNORMAL PSA: ICD-10-CM

## 2020-05-26 DIAGNOSIS — E61.1 IRON DEFICIENCY: ICD-10-CM

## 2020-05-26 DIAGNOSIS — R10.31 ABDOMINAL PAIN, RIGHT LOWER QUADRANT: Primary | ICD-10-CM

## 2020-05-26 DIAGNOSIS — F33.1 MDD (MAJOR DEPRESSIVE DISORDER), RECURRENT EPISODE, MODERATE: ICD-10-CM

## 2020-05-26 PROCEDURE — 99213 PR OFFICE/OUTPT VISIT, EST, LEVL III, 20-29 MIN: ICD-10-PCS | Mod: 95,,, | Performed by: INTERNAL MEDICINE

## 2020-05-26 PROCEDURE — 1101F PT FALLS ASSESS-DOCD LE1/YR: CPT | Mod: CPTII,,, | Performed by: INTERNAL MEDICINE

## 2020-05-26 PROCEDURE — 25000003 PHARM REV CODE 250: Performed by: NURSE PRACTITIONER

## 2020-05-26 PROCEDURE — 96374 THER/PROPH/DIAG INJ IV PUSH: CPT

## 2020-05-26 PROCEDURE — 1101F PR PT FALLS ASSESS DOC 0-1 FALLS W/OUT INJ PAST YR: ICD-10-PCS | Mod: CPTII,,, | Performed by: INTERNAL MEDICINE

## 2020-05-26 PROCEDURE — 63600175 PHARM REV CODE 636 W HCPCS: Performed by: NURSE PRACTITIONER

## 2020-05-26 PROCEDURE — 99213 OFFICE O/P EST LOW 20 MIN: CPT | Mod: 95,,, | Performed by: INTERNAL MEDICINE

## 2020-05-26 PROCEDURE — 96375 TX/PRO/DX INJ NEW DRUG ADDON: CPT

## 2020-05-26 PROCEDURE — 1159F PR MEDICATION LIST DOCUMENTED IN MEDICAL RECORD: ICD-10-PCS | Mod: ,,, | Performed by: INTERNAL MEDICINE

## 2020-05-26 PROCEDURE — 1159F MED LIST DOCD IN RCRD: CPT | Mod: ,,, | Performed by: INTERNAL MEDICINE

## 2020-05-26 RX ORDER — HEPARIN 100 UNIT/ML
500 SYRINGE INTRAVENOUS
Status: CANCELLED | OUTPATIENT
Start: 2020-05-26

## 2020-05-26 RX ORDER — ONDANSETRON 2 MG/ML
8 INJECTION INTRAMUSCULAR; INTRAVENOUS ONCE
Status: COMPLETED | OUTPATIENT
Start: 2020-05-26 | End: 2020-05-26

## 2020-05-26 RX ORDER — SODIUM CHLORIDE 0.9 % (FLUSH) 0.9 %
10 SYRINGE (ML) INJECTION
Status: CANCELLED | OUTPATIENT
Start: 2020-05-27

## 2020-05-26 RX ORDER — SODIUM CHLORIDE 0.9 % (FLUSH) 0.9 %
10 SYRINGE (ML) INJECTION
Status: CANCELLED | OUTPATIENT
Start: 2020-05-26

## 2020-05-26 RX ORDER — HEPARIN 100 UNIT/ML
500 SYRINGE INTRAVENOUS
Status: CANCELLED | OUTPATIENT
Start: 2020-05-27

## 2020-05-26 RX ADMIN — FERRIC CARBOXYMALTOSE INJECTION 750 MG: 50 INJECTION, SOLUTION INTRAVENOUS at 08:05

## 2020-05-26 RX ADMIN — ONDANSETRON 8 MG: 2 INJECTION, SOLUTION INTRAMUSCULAR; INTRAVENOUS at 09:05

## 2020-05-26 RX ADMIN — SODIUM CHLORIDE: 9 INJECTION, SOLUTION INTRAVENOUS at 09:05

## 2020-05-26 NOTE — PLAN OF CARE
Problem: Adult Inpatient Plan of Care  Goal: Optimal Comfort and Wellbeing  Intervention: Provide Person-Centered Care  Flowsheets (Taken 5/26/2020 0948)  Trust Relationship/Rapport: care explained; reassurance provided; choices provided; thoughts/feelings acknowledged; emotional support provided; empathic listening provided; questions answered; questions encouraged

## 2020-05-26 NOTE — PLAN OF CARE
Pt tolerated Injectafer #1 today. Pt stayed for 30 min post infusion observation. Pt complained of Nausea Zofran 8mg IVP given Per Valeria NP. Vss. NAD.PIV flushed and removed. declined AVS. Uses my Ochnser. Discharged home. Ambulated independently.

## 2020-05-27 PROBLEM — H16.229 SICCA SYNDROME WITH KERATOCONJUNCTIVITIS: Status: ACTIVE | Noted: 2020-05-27

## 2020-05-27 PROBLEM — M35.01 SICCA SYNDROME WITH KERATOCONJUNCTIVITIS: Status: ACTIVE | Noted: 2020-05-27

## 2020-05-28 ENCOUNTER — TELEPHONE (OUTPATIENT)
Dept: HEMATOLOGY/ONCOLOGY | Facility: CLINIC | Age: 70
End: 2020-05-28

## 2020-05-29 ENCOUNTER — OFFICE VISIT (OUTPATIENT)
Dept: HEMATOLOGY/ONCOLOGY | Facility: CLINIC | Age: 70
End: 2020-05-29
Payer: COMMERCIAL

## 2020-05-29 DIAGNOSIS — D50.9 IRON DEFICIENCY ANEMIA, UNSPECIFIED IRON DEFICIENCY ANEMIA TYPE: Primary | ICD-10-CM

## 2020-05-29 PROCEDURE — 1101F PR PT FALLS ASSESS DOC 0-1 FALLS W/OUT INJ PAST YR: ICD-10-PCS | Mod: CPTII,,, | Performed by: INTERNAL MEDICINE

## 2020-05-29 PROCEDURE — 1159F PR MEDICATION LIST DOCUMENTED IN MEDICAL RECORD: ICD-10-PCS | Mod: ,,, | Performed by: INTERNAL MEDICINE

## 2020-05-29 PROCEDURE — 99204 OFFICE O/P NEW MOD 45 MIN: CPT | Mod: 95,,, | Performed by: INTERNAL MEDICINE

## 2020-05-29 PROCEDURE — 1101F PT FALLS ASSESS-DOCD LE1/YR: CPT | Mod: CPTII,,, | Performed by: INTERNAL MEDICINE

## 2020-05-29 PROCEDURE — 1159F MED LIST DOCD IN RCRD: CPT | Mod: ,,, | Performed by: INTERNAL MEDICINE

## 2020-05-29 PROCEDURE — 99204 PR OFFICE/OUTPT VISIT, NEW, LEVL IV, 45-59 MIN: ICD-10-PCS | Mod: 95,,, | Performed by: INTERNAL MEDICINE

## 2020-05-29 NOTE — PROGRESS NOTES
Subjective:       Patient ID: Raffy Rutherford Jr.    Chief Complaint: Anemia    HPI     Raffy Rutherford Jr. is a 70 y.o. male, referred by No ref. provider found, to clinic for evaluation and management of iron deficenicy anemia.   He notes feeling fatigued, but otherwise no other complaints.        Review of Systems   Constitutional: Positive for fatigue. Negative for activity change, appetite change, chills, fever and unexpected weight change.   HENT: Negative for congestion, hearing loss, mouth sores, sore throat and trouble swallowing.    Eyes: Negative for pain and visual disturbance.   Respiratory: Negative for cough, shortness of breath and wheezing.    Cardiovascular: Negative for chest pain, palpitations and leg swelling.   Gastrointestinal: Negative for abdominal pain, constipation, diarrhea, nausea and vomiting.   Endocrine: Negative for cold intolerance and heat intolerance.   Genitourinary: Negative for difficulty urinating, discharge, dysuria, enuresis, frequency, hematuria, scrotal swelling and testicular pain.   Musculoskeletal: Negative for arthralgias and myalgias.   Skin: Negative for color change, rash and wound.   Allergic/Immunologic: Negative for environmental allergies and food allergies.   Neurological: Negative for weakness, numbness and headaches.   Hematological: Negative for adenopathy. Does not bruise/bleed easily.   Psychiatric/Behavioral: Negative for confusion, hallucinations and sleep disturbance. The patient is not nervous/anxious.    All other systems reviewed and are negative.        Allergies:  Review of patient's allergies indicates:   Allergen Reactions    Alphagan [brimonidine]      Patient taking MASO-B Selective Inhibitor Selegiline (Emsam)    Coumadin [warfarin]      itch    Oxycodone      hiccups    Pennsaid [diclofenac sodium] Rash       Medications:  Current Outpatient Medications   Medication Sig Dispense Refill    amoxicillin-clavulanate 875-125mg (AUGMENTIN)  875-125 mg per tablet Take 1 tablet by mouth 2 (two) times daily. 14 tablet 0    butalbital-acetaminophen-caffeine -40 mg (FIORICET, ESGIC) -40 mg per tablet Take 1 tablet by mouth 6 hours as needed for Headaches. 30 tablet 0    clonazePAM (KLONOPIN) 0.5 MG tablet Take two tablets by mouth at bedtime and then one tablet by mouth as needed for tinnitus. 75 tablet 5    cyanocobalamin 1,000 mcg/mL injection       diclofenac sodium (VOLTAREN) 1 % Gel Apply 2 g topically once daily. 100 g 0    famotidine (PEPCID) 20 MG tablet Take 1 tablet (20 mg total) by mouth 2 (two) times daily. 60 tablet 0    HYDROcodone-acetaminophen (NORCO) 5-325 mg per tablet Take 1 tablet by mouth every 12 (twelve) hours as needed for Pain (moderate to severe). 60 tablet 0    lamoTRIgine (LAMICTAL) 200 MG tablet take 1 tablet by mouth every day 90 tablet 3    methocarbamol (ROBAXIN) 750 MG Tab       pravastatin (PRAVACHOL) 40 MG tablet Take 1 tablet (40 mg total) by mouth once daily. 90 tablet 3    pregabalin (LYRICA) 100 MG capsule Take 1 capsule (100 mg total) by mouth 3 (three) times daily. 90 capsule 5    pregabalin (LYRICA) 50 MG capsule Take 1 capsule (50 mg total) by mouth 2 (two) times daily. 60 capsule 5    RABEprazole (ACIPHEX) 20 mg tablet Take 1 tablet (20 mg total) by mouth 2 (two) times daily. 180 tablet 3    selegiline (EMSAM) 12 mg/24 hr Place 1 new patch onto the skin once daily. 90 patch 3    senna-docusate 8.6-50 mg (PERICOLACE) 8.6-50 mg per tablet Take 2 tablets by mouth nightly as needed for Constipation.      sucralfate (CARAFATE) 1 gram tablet TAKE 1 TABLET BY MOUTH 4 TIMES A  tablet 4    tadalafil (CIALIS) 5 MG tablet Take 1 tablet (5 mg total) by mouth once daily. 30 tablet 12    traZODone (DESYREL) 100 MG tablet take 1 tablet by mouth every night at bedtime 90 tablet 3    ubrogepant (UBRELVY)tablet 50 mg 50 mg.       No current facility-administered medications for this visit.         PMH:  Past Medical History:   Diagnosis Date    Adenomatous polyp 2003    Adenomatous polyp     Allergy     Arthritis     Back pain     after trauma beginning in 195    Cataract     Chronic pain     neck and left shoulder    Cluster headache 2013    Colon polyp     Degenerative disc disease     Depression     Diverticulitis 12/2013    Elevated PSA     Fibromyalgia 2013    GERD (gastroesophageal reflux disease)     Hepatitis 1970's    A    History of prostate biopsy 2002    Hyperlipidemia     Joint pain     Sleep apnea     Special screening for malignant neoplasms, colon 5/5/2014    Thyroid nodule 7/16/2014    Visual disturbance 2012    problems after cataract surgery       PSH:  Past Surgical History:   Procedure Laterality Date    BACK SURGERY      CATARACT EXTRACTION W/  INTRAOCULAR LENS IMPLANT Bilateral     CHOLECYSTECTOMY      COLONOSCOPY N/A 12/17/2019    Procedure: COLONOSCOPY;  Surgeon: Amadou Hardin MD;  [facility]  Service: Endoscopy;  Laterality: N/A;  pt request to do Miralax bowel prep-BB    COSMETIC SURGERY  2/10/2015    Direct mid-forehead brow lift    COSMETIC SURGERY  2/10/2015    Bilateral upper lid blepahroplasty    ESOPHAGOGASTRODUODENOSCOPY N/A 12/17/2019    Procedure: ESOPHAGOGASTRODUODENOSCOPY (EGD);  Surgeon: Amadou Hardin MD;  Location: [facility] ([address] [place] [address]);  Service: Endoscopy;  Laterality: N/A;    EYE SURGERY      HEMORRHOID SURGERY      with complication of chronic bleeding for 6 weeks     INJECTION OF STEROID Right 12/6/2018    Procedure: INJECTION, STEROID Right SI Joint Block and Steroid Injection;  Surgeon: Jason Caldwell MD;  Location: [facility];  Service: Neurosurgery;  Laterality: Right;  Procedure: Right SI Joint Block and Steroid Injection  Surgery Time: 30 MIN  LOS: 0  Anesthesia: MAC  Radiology: C-arm  Bed: [place] [id number] Poster  Position: Prone    INJECTION OF STEROID Right 9/19/2019    Procedure: INJECTION, STEROID  Procedure: Right SI joint block nd steroid injection;  Surgeon: Jason Caldwell MD;  Location: [facility];  Service: Neurosurgery;  Laterality: Right;  Procedure: Right SI joint block nd steroid injection  Surgery Time: 30 mins  LOS:   Anesthesia: General MAC  Radiology:C-arm  Bed: Regular Bed  Position: Prone    JOINT REPLACEMENT      KNEE SURGERY      involving arthroscopic surgery to both knees    SINUS SURGERY      SINUS SURGERY      left molar and sinus surgery for trigeminal neuralgia    SPINAL FUSION  6/22/2015    L3-L5 XLIF    SPINE SURGERY  6/22/15    XLIF/TANA    TOTAL HIP ARTHROPLASTY  4/2012    Pt states he had total hip replacement on his left hip.       FamHx:  Family History   Problem Relation Age of Onset    Cancer Mother         colon; uterine    Nephrolithiasis Mother     Stroke Mother 86    Pancreatitis Brother     Vision loss Brother         macular degeneration    Diabetes Brother     Macular degeneration Brother     Migraines Sister     No Known Problems Father     No Known Problems Maternal Grandmother     No Known Problems Maternal Grandfather     No Known Problems Paternal Grandmother     No Known Problems Paternal Grandfather     No Known Problems Sister     No Known Problems Maternal Aunt     No Known Problems Maternal Uncle     No Known Problems Paternal Aunt     No Known Problems Paternal Uncle     Melanoma Neg Hx     Amblyopia Neg Hx     Blindness Neg Hx     Cataracts Neg Hx     Glaucoma Neg Hx     Hypertension Neg Hx     Retinal detachment Neg Hx     Strabismus Neg Hx     Thyroid disease Neg Hx     Allergic rhinitis Neg Hx     Allergies Neg Hx     Angioedema Neg Hx     Asthma Neg Hx     Eczema Neg Hx     Urticaria Neg Hx     Rhinitis Neg Hx     Immunodeficiency Neg Hx     Atopy Neg Hx        SocHx:  Social History     Socioeconomic History    Marital status:      Spouse name: Not on file    Number of children: Not on file    Years  of education: Not on file    Highest education level: Not on file   Occupational History    Occupation: Psychotherpist    Social Needs    Financial resource strain: Not hard at all    Food insecurity:     Worry: Never true     Inability: Never true    Transportation needs:     Medical: No     Non-medical: No   Tobacco Use    Smoking status: Never Smoker    Smokeless tobacco: Never Used   Substance and Sexual Activity    Alcohol use: Yes     Frequency: 4 or more times a week     Drinks per session: 1 or 2     Binge frequency: Never     Comment: occasion    Drug use: No    Sexual activity: Yes     Partners: Female   Lifestyle    Physical activity:     Days per week: 0 days     Minutes per session: 0 min    Stress: To some extent   Relationships    Social connections:     Talks on phone: More than three times a week     Gets together: Twice a week     Attends [employer] service: Patient refused     Active member of club or organization: Patient refused     Attends meetings of clubs or organizations: Patient refused     Relationship status:    Other Topics Concern    Not on file   Social History Narrative    Not on file       Distress Score    Distress Score: (P) 7        Practical Problems Physical Problems   : (P) No Appearance: (P) No   Housing: (P) No Bathing / Dressing: (P) No   Insurance / Financial: (P) No Breathing: (P) No    Transportation: (P) No  Changes in Urination: (P) No    Work / School: (P) No  Constipation: (P) No   Treatment Decisions: (P) Yes  Diarrhea: (P) No     Eating: (P) No    Family Problems Fatigue: (P) Yes    Dealing with Children: (P) No Feeling Swollen: (P) No    Dealing with Partner: (P) No Fevers: (P) No    Ability to Have Children: (P) No  Getting Around: (P) Yes       Indigestion: (P) Yes     Memory / Concentration: (P) No   Emotional Problems Mouth Sores: (P) No    Depression: (P) Yes  Nausea: (P) No    Fears: (P) No  Nose Dry / Congested: (P) No     Nervousness: (P) No  Pain: (P) Yes    Sadness: (P) Yes Sexual: (P) No    Worry: (P) No Skin Dry / Itchy: (P) No    Loss of Interest in Usual Activities: (P) Yes Sleep: (P) No     Substance Abuse: (P) No    Spiritual/Religions Concerns Tingling in Hands / Feet: (P) Yes   Spritual / Pentec[employer]al Concerns: (P) No         Other Problems                Objective:          LABS:  WBC   Date Value Ref Range Status   05/09/2020 5.29 3.90 - 12.70 K/uL Final     Hemoglobin   Date Value Ref Range Status   05/09/2020 10.1 (L) 14.0 - 18.0 g/dL Final     Hematocrit   Date Value Ref Range Status   05/09/2020 35.4 (L) 40.0 - 54.0 % Final     Platelets   Date Value Ref Range Status   05/09/2020 279 150 - 350 K/uL Final       Chemistry        Component Value Date/Time     05/07/2020 1103    K 4.3 05/07/2020 1103     05/07/2020 1103    CO2 29 05/07/2020 1103    BUN 12 05/07/2020 1103    CREATININE 0.8 05/07/2020 1103    CREATININE 0.8 05/07/2020 1103     05/07/2020 1103        Component Value Date/Time    CALCIUM 9.8 05/07/2020 1103    ALKPHOS 37 (L) 05/07/2020 1103    AST 26 05/07/2020 1103    ALT 26 05/07/2020 1103    BILITOT 0.4 05/07/2020 1103    ESTGFRAFRICA >60.0 05/07/2020 1103    ESTGFRAFRICA >60.0 05/07/2020 1103    EGFRNONAA >60.0 05/07/2020 1103    EGFRNONAA >60.0 05/07/2020 1103              Assessment:       1. Iron deficiency anemia, unspecified iron deficiency anemia type          Plan:       1. Microcytic Anemia:    Etiology unclear.  No other cell lines affected.  Recent c-scope showed only one polyp.  Recent back surgery, but anemia was noted prior to the this procedure.  Had first injectafer earlier this week, second scheduled for Tuesday.      RTC 6 wks with CBC, CMP, Iron studies.      The patient location is: home due to COVID-19 global pandemic  The chief complaint leading to consultation is: cancer   Visit type: Virtual visit with synchronous audio and video  Total time spent with patient:  25  Each patient to whom he or she provides medical services by telemedicine is:  (1) informed of the relationship between the physician and patient and the respective role of any other health care provider with respect to management of the patient; and (2) notified that he or she may decline to receive medical services by telemedicine and may withdraw from such care at any time.    The patient agrees with the plan, and all questions have been answered to their satisfaction.      More than 25 mins were spent during this encounter, greater than 50% was spent in direct counseling and/or coordination of care.     Todd Bianchi M.D., M.S., F.A.C.P.  Hematology and Oncology Attending  [facility]

## 2020-06-01 ENCOUNTER — LAB VISIT (OUTPATIENT)
Dept: SPORTS MEDICINE | Facility: CLINIC | Age: 70
End: 2020-06-01
Attending: INTERNAL MEDICINE
Payer: COMMERCIAL

## 2020-06-01 DIAGNOSIS — D50.9 IRON DEFICIENCY ANEMIA, UNSPECIFIED IRON DEFICIENCY ANEMIA TYPE: ICD-10-CM

## 2020-06-01 DIAGNOSIS — Z13.9 ENCOUNTER FOR SCREENING: ICD-10-CM

## 2020-06-01 PROCEDURE — U0003 INFECTIOUS AGENT DETECTION BY NUCLEIC ACID (DNA OR RNA); SEVERE ACUTE RESPIRATORY SYNDROME CORONAVIRUS 2 (SARS-COV-2) (CORONAVIRUS DISEASE [COVID-19]), AMPLIFIED PROBE TECHNIQUE, MAKING USE OF HIGH THROUGHPUT TECHNOLOGIES AS DESCRIBED BY CMS-2020-01-R: HCPCS

## 2020-06-01 NOTE — PROGRESS NOTES
COVID Screening Specimen Collected    POSTIVE for the following symptoms:  None    NEGATIVE for the following symptoms:  Fever  Cough  Shortness of breath  Difficulty breathing  GI symptoms such as diarrhea or nausea  Loss of taste  Loss of smell    Patient was given:  1.Instructions for Patients Awaiting COVID-19 Test Results  2. CDC: What to do if you are sick with coronavirus disease 2019 (COVID-19)

## 2020-06-02 ENCOUNTER — INFUSION (OUTPATIENT)
Dept: INFUSION THERAPY | Facility: HOSPITAL | Age: 70
End: 2020-06-02
Attending: INTERNAL MEDICINE
Payer: COMMERCIAL

## 2020-06-02 VITALS
RESPIRATION RATE: 18 BRPM | TEMPERATURE: 99 F | DIASTOLIC BLOOD PRESSURE: 65 MMHG | SYSTOLIC BLOOD PRESSURE: 118 MMHG | HEART RATE: 55 BPM

## 2020-06-02 DIAGNOSIS — D50.9 IRON DEFICIENCY ANEMIA, UNSPECIFIED IRON DEFICIENCY ANEMIA TYPE: Primary | ICD-10-CM

## 2020-06-02 LAB — SARS-COV-2 RNA RESP QL NAA+PROBE: NOT DETECTED

## 2020-06-02 PROCEDURE — 25000003 PHARM REV CODE 250: Performed by: NURSE PRACTITIONER

## 2020-06-02 PROCEDURE — 63600175 PHARM REV CODE 636 W HCPCS: Mod: JG | Performed by: NURSE PRACTITIONER

## 2020-06-02 PROCEDURE — 96374 THER/PROPH/DIAG INJ IV PUSH: CPT

## 2020-06-02 PROCEDURE — A4216 STERILE WATER/SALINE, 10 ML: HCPCS | Performed by: NURSE PRACTITIONER

## 2020-06-02 RX ORDER — HEPARIN 100 UNIT/ML
500 SYRINGE INTRAVENOUS
Status: DISCONTINUED | OUTPATIENT
Start: 2020-06-02 | End: 2020-06-02 | Stop reason: HOSPADM

## 2020-06-02 RX ORDER — SODIUM CHLORIDE 0.9 % (FLUSH) 0.9 %
10 SYRINGE (ML) INJECTION
Status: DISCONTINUED | OUTPATIENT
Start: 2020-06-02 | End: 2020-06-02 | Stop reason: HOSPADM

## 2020-06-02 RX ADMIN — SODIUM CHLORIDE 750 MG: 9 INJECTION, SOLUTION INTRAVENOUS at 10:06

## 2020-06-02 RX ADMIN — Medication 10 ML: at 11:06

## 2020-06-02 RX ADMIN — SODIUM CHLORIDE: 9 INJECTION, SOLUTION INTRAVENOUS at 10:06

## 2020-06-02 NOTE — PLAN OF CARE
Pt tolerated Injectafer without complications. VSS. No s/s of reaction. 30 min obs completed. Instructed to contact MD with any questions. PIV removed and AVS given to patient.

## 2020-06-03 RX ORDER — UBROGEPANT 50 MG/1
50 TABLET ORAL
Qty: 10 TABLET | Refills: 1 | Status: SHIPPED | OUTPATIENT
Start: 2020-06-03 | End: 2020-09-08

## 2020-06-05 ENCOUNTER — LAB VISIT (OUTPATIENT)
Dept: LAB | Facility: HOSPITAL | Age: 70
End: 2020-06-05
Payer: COMMERCIAL

## 2020-06-05 ENCOUNTER — PATIENT MESSAGE (OUTPATIENT)
Dept: UROLOGY | Facility: CLINIC | Age: 70
End: 2020-06-05

## 2020-06-05 DIAGNOSIS — N13.8 BPH WITH URINARY OBSTRUCTION: ICD-10-CM

## 2020-06-05 DIAGNOSIS — R97.20 ELEVATED PSA: ICD-10-CM

## 2020-06-05 DIAGNOSIS — N52.01 ERECTILE DYSFUNCTION DUE TO ARTERIAL INSUFFICIENCY: ICD-10-CM

## 2020-06-05 DIAGNOSIS — N40.1 BPH WITH URINARY OBSTRUCTION: ICD-10-CM

## 2020-06-05 DIAGNOSIS — R39.15 URINARY URGENCY: ICD-10-CM

## 2020-06-05 LAB — COMPLEXED PSA SERPL-MCNC: 7 NG/ML (ref 0–4)

## 2020-06-05 PROCEDURE — 36415 COLL VENOUS BLD VENIPUNCTURE: CPT

## 2020-06-05 PROCEDURE — 84153 ASSAY OF PSA TOTAL: CPT

## 2020-06-08 ENCOUNTER — TELEPHONE (OUTPATIENT)
Dept: PHARMACY | Facility: CLINIC | Age: 70
End: 2020-06-08

## 2020-06-08 ENCOUNTER — TELEPHONE (OUTPATIENT)
Dept: UROLOGY | Facility: CLINIC | Age: 70
End: 2020-06-08

## 2020-06-08 DIAGNOSIS — R97.20 ELEVATED PSA: Primary | ICD-10-CM

## 2020-06-08 NOTE — TELEPHONE ENCOUNTER
Left message.   PSA had dropped down to a 7;  Same as when had MRI done showing show significant lesions.  Recommend rechecking PSA in 6 months.

## 2020-06-10 ENCOUNTER — CLINICAL SUPPORT (OUTPATIENT)
Dept: REHABILITATION | Facility: HOSPITAL | Age: 70
End: 2020-06-10
Attending: NEUROLOGICAL SURGERY
Payer: COMMERCIAL

## 2020-06-10 DIAGNOSIS — M54.40 CHRONIC BILATERAL LOW BACK PAIN WITH SCIATICA, SCIATICA LATERALITY UNSPECIFIED: ICD-10-CM

## 2020-06-10 DIAGNOSIS — G89.29 CHRONIC BILATERAL LOW BACK PAIN WITH SCIATICA, SCIATICA LATERALITY UNSPECIFIED: ICD-10-CM

## 2020-06-10 DIAGNOSIS — R29.898 WEAKNESS OF BOTH LOWER EXTREMITIES: ICD-10-CM

## 2020-06-10 DIAGNOSIS — Z98.890 S/P LUMBAR MICRODISCECTOMY: Primary | ICD-10-CM

## 2020-06-10 PROCEDURE — 97161 PT EVAL LOW COMPLEX 20 MIN: CPT

## 2020-06-10 NOTE — PLAN OF CARE
[facility]  Physical Therapy Initial Evaluation    Name: Raffy Rutherford Jr.  Clinic Number: [id number]    Therapy Diagnosis:   Encounter Diagnosis   Name Primary?    Weakness of both lower extremities      Physician: Jason Caldwell MD    Physician Orders: PT Eval and Treat pool therapy  Medical Diagnosis from Referral:Z98.890 (ICD-10-CM) - S/P lumbar microdiscectomy  Evaluation Date: 11/20/2019  Authorization Period Expiration: 12/31/2020  Plan of Care Expiration: 12/31/2020  Visit # / Visits authorized: 1/ 12  Total Visits: 1    Time In: 10:00  Time Out: 10:45  Total Billable Time: 45 minutes    Precautions: fibromyalgia  Procedures: Right MIS approach to the L5-S1  level  2.  L5-S1 laminotomy and microdiscectomy on 10/25/2019  Subjective   Date of onset: chronic back pain for years  History of current condition - Raffy reports: 10/2019 increased back pain after ALYSON; had L5 microdiskectomy with some relief of radicular pain; He was progressing well with aquatic therapy but had to cease during the COVID epidemic. He would like to continue with aquatic therapy but is also concerned about his gait/balance. He purchased AFOs for his foot drop which helps. He has not had a fall in the past 1 year. His work is limited because he can only sit for a short period of time.        Past Medical History:   Diagnosis Date    Adenomatous polyp 2003    Adenomatous polyp     Allergy     Arthritis     Back pain     after trauma beginning in 195    Cataract     Chronic pain     neck and left shoulder    Cluster headache 2013    Colon polyp     Degenerative disc disease     Depression     Diverticulitis 12/2013    Elevated PSA     Fibromyalgia 2013    GERD (gastroesophageal reflux disease)     Hepatitis 1970's    A    History of prostate biopsy 2002    Hyperlipidemia     Joint pain     Sleep apnea     Special screening for malignant neoplasms, colon 5/5/2014    Thyroid nodule  7/16/2014    Visual disturbance 2012    problems after cataract surgery     Raffy Rutherford Jr.  has a past surgical history that includes Sinus surgery; Knee surgery; Hemorrhoid surgery; Cholecystectomy; Sinus surgery; Eye surgery; Back surgery; Joint replacement; Total hip arthroplasty (4/2012); Cosmetic surgery (2/10/2015); Cosmetic surgery (2/10/2015); Spine surgery (6/22/15); Cataract extraction w/  intraocular lens implant (Bilateral); Spinal fusion (6/22/2015); Injection of steroid (Right, 12/6/2018); and Injection of steroid (Right, 9/19/2019).    Raffy has a current medication list which includes the following prescription(s): butalbital-acetaminophen-caffeine -40 mg, celecoxib, clonazepam, diclofenac sodium, galcanezumab-gnlm, galcanezumab-gnlm, hydrocodone-acetaminophen, hydrocodone-acetaminophen, indomethacin, lamotrigine, pravastatin, pregabalin, rabeprazole, selegiline, senna-docusate 8.6-50 mg, sucralfate, and trazodone.    Review of patient's allergies indicates:   Allergen Reactions    Alphagan [brimonidine]      Patient taking MASO-B Selective Inhibitor Selegiline (Emsam)    Coumadin [warfarin]      itch    Oxycodone      hiccups    Pennsaid [diclofenac sodium] Rash        Imaging, MRI 10/17/2019 No evidence for sacroiliitis.  2. Large disc extrusion at L5-S1 with cranial migration and compression of the right L5 nerve root.    Prior Therapy: prior land & aquatic therapy  Social History:  lives with their spouse   Occupation: psychotherapist  Prior Level of Function: Able to walk without AD  Current Level of Function: using cane for mobility; increased pain with distance; difficulty dressing lower body    Pain:  Current 2/10, worst 6/10, best 2/10   Location: bilateral back R>L  Description: Burning, Sharp and Shooting  Aggravating Factors: Sitting, Walking and Getting out of bed/chair  Easing Factors: lying down,pain medication    Pts goals: Improve gait, decrease radicular symptoms in  the RLE,     Objective     Observation: Ambulates independently.     Posture:  Rounded shoulders & slight forward flexion trunk    Gait: Increased knee flexion on the RLE to clear foot when ambulates without AFO. With AFO knee flexion is normalized. Right trendelenburg.    Functional Movements:   SLS: Unable to perform on either side, looses balance instantly.    Lumbar Range of Motion:    Percent Pain   Flexion 25   no        Extension 0   central        Left Side Bending 25 no        Right Side Bending 25 no            Lower Extremity Strength  Right LE  Left LE    Knee extension: 4-/5 Knee extension: 4+/5   Knee flexion: 5/5 Knee flexion: 5/5   Hip flexion: 3-/5 Hip flexion: 3-/5   Hip extension:  3-/5 Hip extension: 3-/5   Hip abduction: 3-/5 Hip abduction: 3-/5   Hip adduction: 4-/5 Hip adduction 4-/5   Ankle dorsiflexion: 2/5 Ankle dorsiflexion: 4-/5   Ankle plantarflexion: 3-/5 Ankle plantarflexion: 3-/5         Neuro Dynamic Testing:    Sciatic nerve:      SLR: R = +     L = -    Palpation:Negative tenderness  Sensation: decreased light touch RLE        CMS Impairment/Limitation/Restriction for FOTO lumbar spine Survey    Therapist reviewed FOTO scores for Raffy Rutherford Jr. on 6/10/2020  FOTO documents entered into [employer] - see Media section.    Limitation Score: - FOTO not communicating with EPIC  Category: Other    Current : CL = least 60% but < 80% impaired, limited or restricted  Goal: CK = at least 40% but < 60% impaired, limited or restricted           Home Exercises and Patient Education Provided    Education provided:   Role of aquatic therapy      Written Home Exercises Provided:none    Assessment   Raffy is a 69 y.o. male referred to outpatient Physical Therapy with a medical diagnosis of S/P lumbar discectomy. Pt presents with decreased strength, impaired mobility, pain, decreased endurance,  which impacts the patient's ability to complete functional tasks & activities safely & timely. Patient was  progressing well in aquatic therapy. Will spilt time between aquatic therapy and land based treatment for gait deviations.     Pt prognosis is Fair.   Pt will benefit from skilled aquatic outpatient Physical Therapy to address the deficits stated above and in the chart below, provide pt/family education, and to maximize pt's level of independence.     Plan of care discussed with patient: Yes  Pt's spiritual, cultural and educational needs considered and patient is agreeable to the plan of care and goals as stated below:     Anticipated Barriers for therapy: chronic back pain    Medical Necessity is demonstrated by the following  History  Co-morbidities and personal factors that may impact the plan of care Co-morbidities:   prior lumbar surgery    Personal Factors:   no deficits     low   Examination  Body Structures and Functions, activity limitations and participation restrictions that may impact the plan of care Body Regions:   back  lower extremities    Body Systems:    strength  gait  transfers    Participation Restrictions:   none    Activity limitations:   Learning and applying knowledge  no deficits    General Tasks and Commands  no deficits    Communication  no deficits    Mobility  lifting and carrying objects  walking  driving (bike, car, motorcycle)    Self care  dressing    Domestic Life  shopping  cooking  doing house work (cleaning house, washing dishes, laundry)    Interactions/Relationships  no deficits    Life Areas  no deficits    Community and Social Life  no deficits         low   Clinical Presentation stable and uncomplicated low   Decision Making/ Complexity Score: low     Goals:  Short Term Goals: 6 weeks   1. Patient will be independent in HEP & progressions  2. Patient will improve single leg balance to 5 secons on ea limb.    Long Term Goals: 12 weeks   1. Patient will have FOTO limitation score of 55% to demonstrate improved overall function & decreased pain  2. Patient will report reduced  pain to 0/10 at rest to increase ability to complete work related duties.  3. Patient will increase R hip/knee MMT to 4/5 to demonstrate improved strength  4. Patient will increase R ankle df to 3+/5 to demonstrate improved strength & decreased gait deviations    Plan   Plan of care Certification: 6/11/2019 to 12/31/2020.    Outpatient aquatic Physical Therapy 1-2 times weekly for  12 weeks to include the following interventions: manual therapy, aquatic therapy, patient education, therapeutic exercise, therapeutic activities  Patient may be seen by PTA as part of rehabilitation team      Real Sue, PT, DPT

## 2020-06-15 ENCOUNTER — CLINICAL SUPPORT (OUTPATIENT)
Dept: REHABILITATION | Facility: HOSPITAL | Age: 70
End: 2020-06-15
Attending: NEUROLOGICAL SURGERY
Payer: COMMERCIAL

## 2020-06-15 DIAGNOSIS — R29.898 WEAKNESS OF BOTH LOWER EXTREMITIES: ICD-10-CM

## 2020-06-15 DIAGNOSIS — Z98.890 S/P LUMBAR MICRODISCECTOMY: ICD-10-CM

## 2020-06-15 PROCEDURE — 97112 NEUROMUSCULAR REEDUCATION: CPT

## 2020-06-15 PROCEDURE — 97110 THERAPEUTIC EXERCISES: CPT

## 2020-06-15 NOTE — PROGRESS NOTES
"  Physical Therapy Daily Treatment Note     Name: Raffy Rutherford Jr.  Clinic Number: [id number]    Therapy Diagnosis:   Encounter Diagnoses   Name Primary?    S/P lumbar microdiscectomy     Weakness of both lower extremities      Physician: Nini Rendon MD    Visit Date: 6/15/2020  Physician Orders: PT Eval and Treat pool therapy  Medical Diagnosis from Referral:Z98.890 (ICD-10-CM) - S/P lumbar microdiscectomy  Evaluation Date: 11/20/2019  Authorization Period Expiration: 12/31/2020  Plan of Care Expiration: 12/31/2020  Visit # / Visits authorized: 1/ 12  Total Visits: 1     Time In:  4:35  Time Out: 5:20  Total Billable Time: 45 minutes     Precautions: fibromyalgia  Procedures: Right MIS approach to the L5-S1  level  2.  L5-S1 laminotomy and microdiscectomy on 10/25/2019    Subjective     Pt reports: He is having increased pain today but does not know why. Pain is in the central low back and right gluteal refion.  He was compliant with home exercise program.  Response to previous treatment: NA  Functional change: NA    Pain: 6/10  Location: Lumbar spine into RLE.    Objective     Raffy received therapeutic exercises to develop strength, endurance, ROM, posture and core stabilization for 10 minutes including:  Recumbent cycle 10 mintes     Raffy received the following manual therapy techniques: Joint mobilizations were applied to the: lumabr spine for 0 minutes, including:  -    Raffy participated in neuromuscular re-education activities to improve: Balance, Coordination, Kinesthetic, Proprioception and Posture for 35 minutes. The following activities were included:  Posterior pelvic tilt 10 x 10"  Posterior pelvic tilt with marching, cues to decrease diaphragm dependence  Shuttle 50 lbs 4 x 10  Paloff press 3 x 10  Step ups with focus on SLB   Lateral step ups with focus on SLB    Raffy participated in dynamic functional therapeutic activities to improve functional performance for 0  minutes, " including:  -    Raffy participated in gait training to improve functional mobility and safety for 0  minutes, including:  -    Home Exercises Provided and Patient Education Provided     Education provided:   - Home walking program for cardiorespiratory endurance    Written Home Exercises Provided: Patient instructed to cont prior HEP.  Exercises were reviewed and Raffy was able to demonstrate them prior to the end of the session.  Raffy demonstrated good  understanding of the education provided.     See EMR under Patient Instructions for exercises provided prior visit.    Assessment     Patient utilizes diaphragm as a primary stabilizer for the lumbo pelvic region. Exercises were significantly more difficult when breathing is controlled. Primary complaints of pain are in the L5 dermatome. No increase in symptoms with treatment. Patient advised to resume a walking program to improve cardiorespiratory endurance. This will be very important to improve neurovascular health throughout his rehabilitation.     Raffy is progressing well towards his goals.   Pt prognosis is Fair.     Pt will continue to benefit from skilled outpatient physical therapy to address the deficits listed in the problem list box on initial evaluation, provide pt/family education and to maximize pt's level of independence in the home and community environment.     Pt's spiritual, cultural and educational needs considered and pt agreeable to plan of care and goals.    Anticipated barriers to physical therapy: chronicity of pain    Goals:  Short Term Goals: 6 weeks   1. Patient will be independent in HEP & progressions  2. Patient will improve single leg balance to 5 secons on ea limb.     Long Term Goals: 12 weeks   1. Patient will have FOTO limitation score of 55% to demonstrate improved overall function & decreased pain  2. Patient will report reduced pain to 0/10 at rest to increase ability to complete work related duties.  3. Patient will  increase R hip/knee MMT to 4/5 to demonstrate improved strength  4. Patient will increase R ankle df to 3+/5 to demonstrate improved strength & decreased gait deviations    Plan   Plan of care Certification: 6/11/2019 to 12/31/2020.     Outpatient aquatic Physical Therapy 1-2 times weekly for  12 weeks to include the following interventions: manual therapy, aquatic therapy, patient education, therapeutic exercise, therapeutic activities  Patient may be seen by PTA as part of rehabilitation team        Real Sue PT, DPT    Real Sue PT

## 2020-06-22 ENCOUNTER — TELEPHONE (OUTPATIENT)
Dept: PHYSICAL MEDICINE AND REHAB | Facility: CLINIC | Age: 70
End: 2020-06-22

## 2020-06-22 NOTE — TELEPHONE ENCOUNTER
Called Mrs Geller @ ext [phone] and also on her cell @ [phone].  No answer on either  numbers.  Patient message will be forward to Dr Mac staff for review once there office returns on tomorrow.      ----- Message from JIMMIE Singer sent at 6/11/2020  3:23 PM CDT -----  HI    I see the appt that I made  for my  ON June 17th as new pt with Dr Mac was pushed back to mid AUGUST!!   that is 2 additional  Months!, Is there no appt sooner?  Is there a chance of getting seen sooner on wait list.   Appreciate any help  Valarie   Ext [phone]

## 2020-06-23 ENCOUNTER — CLINICAL SUPPORT (OUTPATIENT)
Dept: REHABILITATION | Facility: HOSPITAL | Age: 70
End: 2020-06-23
Attending: NEUROLOGICAL SURGERY
Payer: COMMERCIAL

## 2020-06-23 DIAGNOSIS — R29.898 WEAKNESS OF BOTH LOWER EXTREMITIES: ICD-10-CM

## 2020-06-23 PROCEDURE — 97112 NEUROMUSCULAR REEDUCATION: CPT

## 2020-06-23 PROCEDURE — 97110 THERAPEUTIC EXERCISES: CPT

## 2020-06-23 NOTE — PROGRESS NOTES
"  Physical Therapy Daily Treatment Note     Name: Raffy Rutherford Jr.  Clinic Number: [id number]    Therapy Diagnosis:   Encounter Diagnosis   Name Primary?    Weakness of both lower extremities      Physician: Nini Rendon MD    Visit Date: 6/23/2020  Physician Orders: PT Eval and Treat pool therapy  Medical Diagnosis from Referral:Z98.890 (ICD-10-CM) - S/P lumbar microdiscectomy  Evaluation Date: 11/20/2019  Authorization Period Expiration: 12/31/2020  Plan of Care Expiration: 12/31/2020  Visit # / Visits authorized: 1/ 12  Total Visits: 1     Time In:  1150  Time Out: 1240  Total Billable Time: 45 minutes     Precautions: fibromyalgia  Procedures: Right MIS approach to the L5-S1  level  2.  L5-S1 laminotomy and microdiscectomy on 10/25/2019    Subjective     Pt reports: Increased right sided pain following the previous PT treatment and his exercise with his .  He was compliant with home exercise program.  Response to previous treatment: NA  Functional change: NA    Pain: 6/10  Location: Lumbar spine into RLE.    Objective     Raffy received therapeutic exercises to develop strength, endurance, ROM, posture and core stabilization for 10 minutes including:  Recumbent cycle 10 mintes     Raffy received the following manual therapy techniques: Joint mobilizations were applied to the: lumabr spine for 0 minutes, including:  -    Raffy participated in neuromuscular re-education activities to improve: Balance, Coordination, Kinesthetic, Proprioception and Posture for 35 minutes. The following activities were included:  Posterior pelvic tilt 10 x 10" -  Bio feedback cuff  Posterior pelvic tilt with marching, cues to decrease diaphragm dependence - Biofeedback cuff  Bridges with cuing for appropriate lumbopelvic control, avoid lumbar extension.  Neurtral spine training in quadruped  Hand heel rocks  Shuttle 50 lbs 4 x 10  Step ups with focus on SLB - NP  Lateral step ups with focus on SLB - NP  Paloff " Press - GTB 3 x 10 ea    Raffy participated in dynamic functional therapeutic activities to improve functional performance for 0  minutes, including:  -    Raffy participated in gait training to improve functional mobility and safety for 0  minutes, including:  -    Home Exercises Provided and Patient Education Provided     Education provided:   - Home walking program for cardiorespiratory endurance    Written Home Exercises Provided: Patient instructed to cont prior HEP.  Exercises were reviewed and Raffy was able to demonstrate them prior to the end of the session.  Raffy demonstrated good  understanding of the education provided.     See EMR under Patient Instructions for exercises provided prior visit.    Assessment     Very challenged by lumbo pelvic control with the biofeedback cuff. Progressed well with more repetitions. Reduced weight bearing exercises as patient had increased pain in the previous session. Progressing well with exercises in a stable position. Will progress to more dynamic motions.     Raffy is progressing well towards his goals.   Pt prognosis is Fair.     Pt will continue to benefit from skilled outpatient physical therapy to address the deficits listed in the problem list box on initial evaluation, provide pt/family education and to maximize pt's level of independence in the home and community environment.     Pt's spiritual, cultural and educational needs considered and pt agreeable to plan of care and goals.    Anticipated barriers to physical therapy: chronicity of pain    Goals:  Short Term Goals: 6 weeks   1. Patient will be independent in HEP & progressions  2. Patient will improve single leg balance to 5 secons on ea limb.     Long Term Goals: 12 weeks   1. Patient will have FOTO limitation score of 55% to demonstrate improved overall function & decreased pain  2. Patient will report reduced pain to 0/10 at rest to increase ability to complete work related duties.  3. Patient  will increase R hip/knee MMT to 4/5 to demonstrate improved strength  4. Patient will increase R ankle df to 3+/5 to demonstrate improved strength & decreased gait deviations    Plan   Plan of care Certification: 6/11/2019 to 12/31/2020.     Outpatient aquatic Physical Therapy 1-2 times weekly for  12 weeks to include the following interventions: manual therapy, aquatic therapy, patient education, therapeutic exercise, therapeutic activities  Patient may be seen by PTA as part of rehabilitation team       Real Sue, PT, DPT

## 2020-06-24 ENCOUNTER — OFFICE VISIT (OUTPATIENT)
Dept: PHYSICAL MEDICINE AND REHAB | Facility: CLINIC | Age: 70
End: 2020-06-24
Payer: COMMERCIAL

## 2020-06-24 VITALS — HEART RATE: 80 BPM | DIASTOLIC BLOOD PRESSURE: 68 MMHG | SYSTOLIC BLOOD PRESSURE: 114 MMHG

## 2020-06-24 DIAGNOSIS — R26.9 GAIT ABNORMALITY: Primary | ICD-10-CM

## 2020-06-24 PROCEDURE — 3288F FALL RISK ASSESSMENT DOCD: CPT | Mod: CPTII,S$GLB,, | Performed by: PHYSICAL MEDICINE & REHABILITATION

## 2020-06-24 PROCEDURE — 1159F PR MEDICATION LIST DOCUMENTED IN MEDICAL RECORD: ICD-10-PCS | Mod: S$GLB,,, | Performed by: PHYSICAL MEDICINE & REHABILITATION

## 2020-06-24 PROCEDURE — 1100F PR PT FALLS ASSESS DOC 2+ FALLS/FALL W/INJURY/YR: ICD-10-PCS | Mod: CPTII,S$GLB,, | Performed by: PHYSICAL MEDICINE & REHABILITATION

## 2020-06-24 PROCEDURE — 99205 OFFICE O/P NEW HI 60 MIN: CPT | Mod: S$GLB,,, | Performed by: PHYSICAL MEDICINE & REHABILITATION

## 2020-06-24 PROCEDURE — 1125F AMNT PAIN NOTED PAIN PRSNT: CPT | Mod: S$GLB,,, | Performed by: PHYSICAL MEDICINE & REHABILITATION

## 2020-06-24 PROCEDURE — 99205 PR OFFICE/OUTPT VISIT, NEW, LEVL V, 60-74 MIN: ICD-10-PCS | Mod: S$GLB,,, | Performed by: PHYSICAL MEDICINE & REHABILITATION

## 2020-06-24 PROCEDURE — 99999 PR PBB SHADOW E&M-EST. PATIENT-LVL III: ICD-10-PCS | Mod: PBBFAC,,, | Performed by: PHYSICAL MEDICINE & REHABILITATION

## 2020-06-24 PROCEDURE — 1125F PR PAIN SEVERITY QUANTIFIED, PAIN PRESENT: ICD-10-PCS | Mod: S$GLB,,, | Performed by: PHYSICAL MEDICINE & REHABILITATION

## 2020-06-24 PROCEDURE — 1159F MED LIST DOCD IN RCRD: CPT | Mod: S$GLB,,, | Performed by: PHYSICAL MEDICINE & REHABILITATION

## 2020-06-24 PROCEDURE — 99999 PR PBB SHADOW E&M-EST. PATIENT-LVL III: CPT | Mod: PBBFAC,,, | Performed by: PHYSICAL MEDICINE & REHABILITATION

## 2020-06-24 PROCEDURE — 1100F PTFALLS ASSESS-DOCD GE2>/YR: CPT | Mod: CPTII,S$GLB,, | Performed by: PHYSICAL MEDICINE & REHABILITATION

## 2020-06-24 PROCEDURE — 3288F PR FALLS RISK ASSESSMENT DOCUMENTED: ICD-10-PCS | Mod: CPTII,S$GLB,, | Performed by: PHYSICAL MEDICINE & REHABILITATION

## 2020-06-24 NOTE — LETTER
July 7, 2020      Nini Rendon MD  [address]  [facility] [address]           [facility]  [address]  [facility] [address]  Phone: [phone]          Patient: Raffy Rutherford Jr.   MR Number: [id number]   YOB: 1950   Date of Visit: 6/24/2020       Dear Dr. Nini Rendon:    Thank you for referring Raffy Rutherford to me for evaluation. Attached you will find relevant portions of my assessment and plan of care.    If you have questions, please do not hesitate to call me. I look forward to following Raffy Rutherford along with you.    Sincerely,    Zack Mac MD    Enclosure  CC:  No Recipients    If you would like to receive this communication electronically, please contact [email or web address] or [phone] to request more information on [facility] access.    For providers and/or their staff who would like to refer a patient to [facility], please contact us through our one-stop-shop provider referral line, [facility], at [phone].    If you feel you have received this communication in error or would no longer like to receive these types of communications, please e-mail [email or web address]

## 2020-06-24 NOTE — PROGRESS NOTES
GENERAL NEUROREHAB FOLLOW-UP  [facility] CLIN[facility]    Chief Complaint   Patient presents with    Leg Pain     with weakness     Referring Provider: Nini Rendon MD  Encounter Date: 06/24/2020      HPI: Raffy Rutherford Jr. is a 70 y.o. male who presents today for follow-up for physiatric evaluation for deficits  to  Bilateral foot drop.    The patient is a 70 year old male who presented today for evaluation of bilateral foot drop. He was initially found to have problem with right leg weakness about 2012. He was initially referred to neurology for concerns of right leg weakness and intermittent associated back pain. He has been found to have lumbar stenosis and treated with PT and ALYSON with some temporary relief. However, noted to have worsening right leg weakness. He reports at that time he has problems with bilateral finger and hand tingling pain. No weakness. He was seen by Dr. Brewer who referred for work-up for radiculopathy. He was concerned for possible peripheral neuropathy vs. Radiculopathy. Found to have right disc herniation and had disckectomy done. Still has temporary relief. EMG at that time suggestive of radiculopathy. However, he continued to have problems with ongoing progression of symptoms, approximately three years ago, started having work-up for other causes, concerning for neuropathy. Had repeat EMG testing, and was still concerning for stable finding related to neuropathy.   Evenatually, he has recurrence of back pain and possible right radicular symptoms but started having onset of left weakness. He was wearing AFOs at that time. 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 Has relief for several months but concerned about ongoing bilateral arm and leg weakness.     Falls: Reduced falls with bilaterally AFOs.  Pain: Mild hip and back pain  Mobility: No assistive  device  Spasticity: Denies  No bladder/bowel incontinence  Therapy:  None.      Past Medical History:   Diagnosis Date    Adenomatous polyp 2003    Adenomatous polyp     Allergy     Arthritis     Back pain     after trauma beginning in 195    Cataract     Chronic pain     neck and left shoulder    Cluster headache 2013    Colon polyp     Degenerative disc disease     Depression     Diverticulitis 12/2013    Elevated PSA     Fibromyalgia 2013    GERD (gastroesophageal reflux disease)     Hepatitis 1970's    A    History of prostate biopsy 2002    Hyperlipidemia     Joint pain     Sleep apnea     Special screening for malignant neoplasms, colon 5/5/2014    Thyroid nodule 7/16/2014    Visual disturbance 2012    problems after cataract surgery     Past Surgical History:   Procedure Laterality Date    BACK SURGERY      CATARACT EXTRACTION W/  INTRAOCULAR LENS IMPLANT Bilateral     CHOLECYSTECTOMY      COLONOSCOPY N/A 12/17/2019    Procedure: COLONOSCOPY;  Surgeon: Amadou Hardin MD;  Location: [facility] ([address] [place] [address]);  Service: Endoscopy;  Laterality: N/A;  pt request to do Miralax bowel prep-BB    COSMETIC SURGERY  2/10/2015    Direct mid-forehead brow lift    COSMETIC SURGERY  2/10/2015    Bilateral upper lid blepahroplasty    ESOPHAGOGASTRODUODENOSCOPY N/A 12/17/2019    Procedure: ESOPHAGOGASTRODUODENOSCOPY (EGD);  Surgeon: Amadou Hardin MD;  Location: [address]);  Service: Endoscopy;  Laterality: N/A;    EYE SURGERY      HEMORRHOID SURGERY      with complication of chronic bleeding for 6 weeks     INJECTION OF STEROID Right 12/6/2018    Procedure: INJECTION, STEROID Right SI Joint Block and Steroid Injection;  Surgeon: Jason Caldwell MD;  Location: [facility];  Service: Neurosurgery;  Laterality: Right;  Procedure: Right SI Joint Block and Steroid Injection  Surgery Time: 30 MIN  LOS: 0  Anesthesia: MAC  Radiology: C-arm  Bed: [place] 4 Poster  Position: Prone    INJECTION OF  STEROID Right 9/19/2019    Procedure: INJECTION, STEROID Procedure: Right SI joint block nd steroid injection;  Surgeon: Jason Caldwell MD;  Location: [facility];  Service: Neurosurgery;  Laterality: Right;  Procedure: Right SI joint block nd steroid injection  Surgery Time: 30 mins  LOS:   Anesthesia: General MAC  Radiology:C-arm  Bed: Regular Bed  Position: Prone    JOINT REPLACEMENT      KNEE SURGERY      involving arthroscopic surgery to both knees    SINUS SURGERY      SINUS SURGERY      left molar and sinus surgery for trigeminal neuralgia    SPINAL FUSION  6/22/2015    L3-L5 XLIF    SPINE SURGERY  6/22/15    XLIF/TANA    TOTAL HIP ARTHROPLASTY  4/2012    Pt states he had total hip replacement on his left hip.     Current Outpatient Medications on File Prior to Visit   Medication Sig Dispense Refill    amoxicillin-clavulanate 875-125mg (AUGMENTIN) 875-125 mg per tablet Take 1 tablet by mouth 2 (two) times daily. 14 tablet 0    butalbital-acetaminophen-caffeine -40 mg (FIORICET, ESGIC) -40 mg per tablet Take 1 tablet by mouth 6 hours as needed for Headaches. 30 tablet 0    clonazePAM (KLONOPIN) 0.5 MG tablet Take two tablets by mouth at bedtime and then one tablet by mouth as needed for tinnitus. 75 tablet 5    cyanocobalamin 1,000 mcg/mL injection       diclofenac sodium (VOLTAREN) 1 % Gel Apply 2 g topically once daily. 100 g 0    famotidine (PEPCID) 20 MG tablet TAKE 1 TABLET BY MOUTH TWICE A DAY 60 tablet 12    HYDROcodone-acetaminophen (NORCO) 5-325 mg per tablet Take 1 tablet by mouth every 12 (twelve) hours as needed for Pain (moderate to severe). 60 tablet 0    lamoTRIgine (LAMICTAL) 200 MG tablet Take 1 tablet (200 mg total) by mouth once daily. 90 tablet 3    methocarbamol (ROBAXIN) 750 MG Tab       pravastatin (PRAVACHOL) 40 MG tablet Take 1 tablet (40 mg total) by mouth once daily. 90 tablet 3    RABEprazole (ACIPHEX) 20 mg tablet Take 1 tablet (20 mg total) by mouth  2 (two) times daily. 180 tablet 3    selegiline (EMSAM) 12 mg/24 hr Place 1 new patch onto the skin once daily. 90 patch 3    senna-docusate 8.6-50 mg (PERICOLACE) 8.6-50 mg per tablet Take 2 tablets by mouth nightly as needed for Constipation.      sucralfate (CARAFATE) 1 gram tablet TAKE 1 TABLET BY MOUTH 4 TIMES A  tablet 4    tadalafil (CIALIS) 5 MG tablet Take 1 tablet (5 mg total) by mouth once daily. 30 tablet 12    traZODone (DESYREL) 100 MG tablet Take 1 tablet (100 mg total) by mouth every evening. 90 tablet 3    ubrogepant (UBRELVY)tablet 50 mg Take 1 tablet (50 mg total) by mouth as needed for Migraine. 10 tablet 1    pregabalin (LYRICA) 100 MG capsule Take 1 capsule (100 mg total) by mouth 3 (three) times daily. 90 capsule 5     No current facility-administered medications on file prior to visit.      Review of patient's allergies indicates:   Allergen Reactions    Alphagan [brimonidine]      Patient taking MASO-B Selective Inhibitor Selegiline (Emsam)    Coumadin [warfarin]      itch    Oxycodone      hiccups    Pennsaid [diclofenac sodium] Rash       Family History:   Family History   Problem Relation Age of Onset    Cancer Mother         colon; uterine    Nephrolithiasis Mother     Stroke Mother 86    Pancreatitis Brother     Vision loss Brother         macular degeneration    Diabetes Brother     Macular degeneration Brother     Migraines Sister     No Known Problems Father     No Known Problems Maternal Grandmother     No Known Problems Maternal Grandfather     No Known Problems Paternal Grandmother     No Known Problems Paternal Grandfather     No Known Problems Sister     No Known Problems Maternal Aunt     No Known Problems Maternal Uncle     No Known Problems Paternal Aunt     No Known Problems Paternal Uncle     Melanoma Neg Hx     Amblyopia Neg Hx     Blindness Neg Hx     Cataracts Neg Hx     Glaucoma Neg Hx     Hypertension Neg Hx     Retinal  detachment Neg Hx     Strabismus Neg Hx     Thyroid disease Neg Hx     Allergic rhinitis Neg Hx     Allergies Neg Hx     Angioedema Neg Hx     Asthma Neg Hx     Eczema Neg Hx     Urticaria Neg Hx     Rhinitis Neg Hx     Immunodeficiency Neg Hx     Atopy Neg Hx         Social History: Lives with wife  in a house.      Barthel Index:     Feeding: ( ) unable (0)   ( ) setup assistance/modified diet (5)    (x) Independent(10)      Bathing ( ) Needs assistance (0) (x)Independent (5)    Grooming  ( ) Needs assistance (0) (x)Independent (5)    Dressing ( ) Needs assistance (0) ( )Can do 50% (5)     (x) Independent (10)    Bowel  ( ) Needs assistance (0) ( ) Occasional accident (5)  (x) Independent  (10)     Bladder ( ) Needs assistance (0) ( ) Occasional accident (5)  (x) Independent  (10)    Toilet Use ( ) Needs assistance (0) ( ) Partial assistance (5)  (x) Independent  (10)     Mobility: ( ) Immobile/<150 feet (0) ( ) Wheelchair independent/>150 feet (5)     ( ) Walks with one person assistance, > 150 feet (10)     (x) Independent +/- AD, g> 150 feet (15)     Transfers ( ) No sitting balance/unable (0)     ( ) Need assistance to sit       (5)     ( ) Verbal/physical assistance (10)     (x) Independent (15)     Stairs  ( ) Unable (0)     ( ) Need verbal, physical, assistance (5)     ( ) Independent (10)      Review of Systems   Musculoskeletal: Positive for falls.   Neurological: Positive for focal weakness.   All other systems reviewed and are negative.       Pertinent Prior Work Up (Imaging/EMGs):    MRI SPINE (10/2/2018):  Stable postoperative changes of T12 vertebroplasty and posterior Instrumented fusion from L3 through L5.  Multilevel lumbar spondylosis as detailed above, without significant spinal canal stenosis or neural foraminal narrowing.    MRI SI JOINT(10/16/2019)    1. No evidence for sacroiliitis.  2. Large disc extrusion at L5-S1 with cranial migration and compression of the right L5 nerve  root.    LEFT HIP XR (7/15/2019):  Postoperative changes of left hip arthroplasty.  No detrimental interval change since 06/15/2018 is observed.    RIGHT HIP XR (12/11/2019):  Stable radiographic appearance of the right hip without evidence of acute osseous abnormality or avascular necrosis of this hip.    EMG (1/25/2013):  There is electrophysiologic evidence for a bilateral, acute, on chronic, left worse than right sciatic neuropathy or a predominantly motor, length dependent ,axonal neuropathy. Clinical correlation is suggested.     EMG (8/5/2014):  There is electrophysiologic evidence for an acute on chronic, axonal, motor neuropathy vs. lumbosacral radiculopathy.     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   2 ALLOWING FOR TECHNICAL DIFFERENCES, THIS IS SIMILAR TO PREVIOUS, ALTHOUGH THERE IS NO DEFINITE EVIDENCE OF ACTIVE DENERVATION AT THIS TIME.   3 THE PARASPINOUS WAS NOT SAMPLED BECAUSE OF PRIOR SURGERY. A POLYRADICULOPATHY CAN NOT BE RUED OUT.         Physical Exam  /68   Pulse 80   Physical Exam   Constitutional: He is oriented to person, place, and time and well-developed, well-nourished, and in no distress.   HENT:   Head: Normocephalic and atraumatic.   Eyes: Pupils are equal, round, and reactive to light. EOM are normal.   Neck: Normal range of motion. Neck supple.   Cardiovascular: Normal rate, regular rhythm and normal heart sounds.   Pulmonary/Chest: Effort normal and breath sounds normal. No respiratory distress. He has no wheezes. He has no rales. He exhibits no tenderness.   Abdominal: Soft. Bowel sounds are normal. He exhibits no distension. There is no abdominal tenderness.   Neurological: He is alert and oriented to person, place, and time. A sensory deficit is present. No cranial nerve deficit. He exhibits normal muscle tone. Coordination and gait abnormal.   CN IX-XII grossly intact  Bilateral UE strength preserved. Mild  sensory loss distally more on right than left with sharp touch.  Diffuse interoosseus wasting in the bilateral left hands with mild hand intrinsic weakness  Bilateral foot drop with steppage gait. Notable pes cavus bilaterally  Diffuse abberant sensation with propioception and sharp touch to the mid-shin, slightly asymmetric   Psychiatric: Affect normal.   Vitals reviewed.        Impression: 70 y.o. male     Raffy was seen today for leg pain.    Diagnoses and all orders for this visit:    Gait abnormality  Comments:  -slowly prog course of weakness from moiz FD  -treated with recurrent disc extrusion s/p diskectomy  -still question of polyneuropathy, rec 4-limb EMG      Orders:  -     Ambulatory referral/consult to Physical Medicine Rehab    Other orders  -     EMG W/ ULTRASOUND AND NERVE CONDUCTION TEST; Future        Follow up: after EMG      Zack Mac MD

## 2020-06-25 ENCOUNTER — CLINICAL SUPPORT (OUTPATIENT)
Dept: REHABILITATION | Facility: HOSPITAL | Age: 70
End: 2020-06-25
Attending: NEUROLOGICAL SURGERY
Payer: COMMERCIAL

## 2020-06-25 DIAGNOSIS — R29.898 WEAKNESS OF BOTH LOWER EXTREMITIES: ICD-10-CM

## 2020-06-25 PROCEDURE — 97113 AQUATIC THERAPY/EXERCISES: CPT | Mod: CQ

## 2020-06-25 NOTE — PROGRESS NOTES
"  Physical Therapy Daily Treatment Note     Name: Raffy Rutherford Jr.  Clinic Number: [id number]    Therapy Diagnosis:   Encounter Diagnosis   Name Primary?    Weakness of both lower extremities      Physician: Nini Rendon MD    Visit Date: 6/25/2020  Physician Orders: PT Eval and Treat pool therapy  Medical Diagnosis from Referral:Z98.890 (ICD-10-CM) - S/P lumbar microdiscectomy  Evaluation Date: 11/20/2019  Authorization Period Expiration: 12/31/2020  Plan of Care Expiration: 12/31/2020  Visit # / Visits authorized: 4/12     Time In:  1455  Time Out: 1545  Total Billable Time: 50 minutes     Precautions: fibromyalgia  Procedures: Right MIS approach to the L5-S1  level  2.  L5-S1 laminotomy and microdiscectomy on 10/25/2019    Subjective     Pt reports: Pt returns to therapy this pm with c/o continued LBP with radicular symptoms down RLE.     He was compliant with home exercise program.  Response to previous treatment: NA  Functional change: NA    Pain: 7/10  Location: Lumbar spine into RLE    Objective     Raffy received aquatic therapeutic exercises to develop strength, endurance, ROM, flexibility, posture, core stabilization and balance for 45 minutes including:    WARMUP x 2 laps each  Walk fwd/bwd/lat    STRETCHES 3 x 20" moiz  HS  Quads    LE EX x 20 s UE support  Heel raise with GS  Hip abd/add  Hip flx/ext  Mini squat    CORE x 20  Mini squat c push/pull  Shoulder flx/ext c open paddles  Shoulder hor abd/add c open paddles    ENDURANCE  Bicycle x 3'    COOL DOWN x np  Walk fwd/bwd/lat    Home Exercises Provided and Patient Education Provided     Education provided:   - Home walking program for cardiorespiratory endurance    Written Home Exercises Provided: Patient instructed to cont prior HEP.  Exercises were reviewed and Raffy was able to demonstrate them prior to the end of the session.  Raffy demonstrated good  understanding of the education provided.     See EMR under Patient Instructions for " exercises provided prior visit.    Assessment     Pt was able to complete all exercises with no c/o increased LBP or radicular symptoms. Pt required v/c to engage core/glutes throughout tx but demonstrated proper form with each exercise. Core remains weak. Pt was able to complete most balance exercises with single or no UE support. Encouraged continued compliance with HEP; pt voiced understanding. No adverse effects reported p tx.     Raffy is progressing well towards his goals.   Pt prognosis is Fair.     Pt will continue to benefit from skilled outpatient physical therapy to address the deficits listed in the problem list box on initial evaluation, provide pt/family education and to maximize pt's level of independence in the home and community environment.     Pt's spiritual, cultural and educational needs considered and pt agreeable to plan of care and goals.    Anticipated barriers to physical therapy: chronicity of pain    Goals:  Short Term Goals: 6 weeks   1. Patient will be independent in HEP & progressions  2. Patient will improve single leg balance to 5 secons on ea limb.     Long Term Goals: 12 weeks   1. Patient will have FOTO limitation score of 55% to demonstrate improved overall function & decreased pain  2. Patient will report reduced pain to 0/10 at rest to increase ability to complete work related duties.  3. Patient will increase R hip/knee MMT to 4/5 to demonstrate improved strength  4. Patient will increase R ankle df to 3+/5 to demonstrate improved strength & decreased gait deviations    Plan   Plan of care Certification: 6/11/2019 to 12/31/2020.     Outpatient aquatic Physical Therapy 1-2 times weekly for  12 weeks to include the following interventions: manual therapy, aquatic therapy, patient education, therapeutic exercise, therapeutic activities  Patient may be seen by PTA as part of rehabilitation team       Kelsie Rodriguez PTA, DPT

## 2020-06-30 ENCOUNTER — CLINICAL SUPPORT (OUTPATIENT)
Dept: REHABILITATION | Facility: HOSPITAL | Age: 70
End: 2020-06-30
Attending: NEUROLOGICAL SURGERY
Payer: COMMERCIAL

## 2020-06-30 DIAGNOSIS — R29.898 WEAKNESS OF BOTH LOWER EXTREMITIES: ICD-10-CM

## 2020-06-30 PROCEDURE — 97140 MANUAL THERAPY 1/> REGIONS: CPT

## 2020-06-30 PROCEDURE — 97112 NEUROMUSCULAR REEDUCATION: CPT

## 2020-06-30 NOTE — PROGRESS NOTES
"  Physical Therapy Daily Treatment Note     Name: Raffy Rutherford Jr.  Clinic Number: [id number]    Therapy Diagnosis:   Encounter Diagnosis   Name Primary?    Weakness of both lower extremities      Physician: Nini Rendon MD    Visit Date: 6/30/2020  Physician Orders: PT Eval and Treat pool therapy  Medical Diagnosis from Referral:Z98.890 (ICD-10-CM) - S/P lumbar microdiscectomy  Evaluation Date: 11/20/2019  Authorization Period Expiration: 12/31/2020  Plan of Care Expiration: 12/31/2020  Visit # / Visits authorized: 1/ 12  Total Visits: 1     Time In:  1114  Time Out: 1210  Total Billable Time: 55 minutes     Precautions: fibromyalgia  Procedures: Right MIS approach to the L5-S1  level  2.  L5-S1 laminotomy and microdiscectomy on 10/25/2019    Subjective     Pt reports:His pain has been controlled but he is concerned because his feels like his legs are getting weaker. His knees buckle with extended standing or walking.     He was compliant with home exercise program.  Response to previous treatment: NA  Functional change: NA    Pain: 3/10  Location: Lumbar spine into RLE.    Objective     Raffy received therapeutic exercises to develop strength, endurance, ROM, posture and core stabilization for 10 minutes including:  Recumbent cycle 10 mintes to improve cardiorespiratory endurance    Raffy received the following manual therapy techniques: Joint mobilizations were applied to the: lumabr spine for 0 minutes, including:  -    Raffy participated in neuromuscular re-education activities to improve: Balance, Coordination, Kinesthetic, Proprioception and Posture for 45 minutes. The following activities were included:  Posterior pelvic tilt 10 x 10" -  Bio feedback cuff  Posterior pelvic tilt with marching, cues to decrease diaphragm dependence - Biofeedback cuff  Bridges with cuing for appropriate lumbopelvic control, avoid lumbar extension.  Neurtral spine training in quadruped  Hand heel rocks  Shuttle 50 lbs 4 " x 10  Step ups with focus on SLB - NP  Lateral step ups with focus on SLB - NP  Paloff Press - GTB 3 x 10 ea    Raffy participated in dynamic functional therapeutic activities to improve functional performance for 0  minutes, including:  -    Raffy participated in gait training to improve functional mobility and safety for 0  minutes, including:  -    Home Exercises Provided and Patient Education Provided     Education provided:   - Home walking program for cardiorespiratory endurance    Written Home Exercises Provided: Patient instructed to cont prior HEP.  Exercises were reviewed and Raffy was able to demonstrate them prior to the end of the session.  Raffy demonstrated good  understanding of the education provided.     See EMR under Patient Instructions for exercises provided 6/30/2020    Assessment     Reports of decreasing leg strength is concerning given the patients previous surgical history. Able to complete SLR with minimal issues. Patient challenged to find neutral spine as he is prone to lumbar extension. Requires hip and core strengthening.    Raffy is progressing well towards his goals.   Pt prognosis is Fair.     Pt will continue to benefit from skilled outpatient physical therapy to address the deficits listed in the problem list box on initial evaluation, provide pt/family education and to maximize pt's level of independence in the home and community environment.     Pt's spiritual, cultural and educational needs considered and pt agreeable to plan of care and goals.    Anticipated barriers to physical therapy: chronicity of pain    Goals:  Short Term Goals: 6 weeks   1. Patient will be independent in HEP & progressions  2. Patient will improve single leg balance to 5 secons on ea limb.     Long Term Goals: 12 weeks   1. Patient will have FOTO limitation score of 55% to demonstrate improved overall function & decreased pain  2. Patient will report reduced pain to 0/10 at rest to increase ability  to complete work related duties.  3. Patient will increase R hip/knee MMT to 4/5 to demonstrate improved strength  4. Patient will increase R ankle df to 3+/5 to demonstrate improved strength & decreased gait deviations    Plan   Plan of care Certification: 6/11/2019 to 12/31/2020.     Outpatient aquatic Physical Therapy 1-2 times weekly for  12 weeks to include the following interventions: manual therapy, aquatic therapy, patient education, therapeutic exercise, therapeutic activities  Patient may be seen by PTA as part of rehabilitation team       Real Sue, PT, DPT

## 2020-07-02 ENCOUNTER — CLINICAL SUPPORT (OUTPATIENT)
Dept: REHABILITATION | Facility: HOSPITAL | Age: 70
End: 2020-07-02
Attending: NEUROLOGICAL SURGERY
Payer: COMMERCIAL

## 2020-07-02 DIAGNOSIS — R29.898 WEAKNESS OF BOTH LOWER EXTREMITIES: ICD-10-CM

## 2020-07-02 PROCEDURE — 97113 AQUATIC THERAPY/EXERCISES: CPT | Mod: CQ

## 2020-07-02 NOTE — PROGRESS NOTES
"  Physical Therapy Daily Treatment Note     Name: Raffy Rutherford Jr.  Clinic Number: [id number]    Therapy Diagnosis:   Encounter Diagnosis   Name Primary?    Weakness of both lower extremities      Physician: Nini Rendon MD    Visit Date: 7/2/2020  Physician Orders: PT Eval and Treat pool therapy  Medical Diagnosis from Referral:Z98.890 (ICD-10-CM) - S/P lumbar microdiscectomy  Evaluation Date: 11/20/2019  Authorization Period Expiration: 12/31/2020  Plan of Care Expiration: 12/31/2020  Visit # / Visits authorized: 6/12     Time In: 1330  Time Out: 1415  Total Billable Time: 50 minutes     Precautions: fibromyalgia  Procedures: Right MIS approach to the L5-S1  level  2.  L5-S1 laminotomy and microdiscectomy on 10/25/2019    Subjective     Pt reports: Pt returns to therapy this pm with c/o continued LBP with radicular symptoms down RLE.     He was compliant with home exercise program.  Response to previous treatment: NA  Functional change: NA    Pain: 7/10  Location: Lumbar spine into RLE    Objective     Raffy received aquatic therapeutic exercises to develop strength, endurance, ROM, flexibility, posture, core stabilization and balance for 45 minutes including:    WARMUP x 2 laps each  Walk fwd/bwd/lat    STRETCHES 3 x 20" moiz  HS  Quads    LE EX x 20 s UE support  Heel raise with GS  Hip abd/add  Hip flx/ext  Mini squat  Leg press c blk noodle  Lateral steps c blue tb x 3 laps  Lateral monster walks c blue tb x 3 laps    CORE x 20  Mini squat c push/pull  Shoulder flx/ext c closed paddles  Shoulder hor abd/add c closed paddles    ENDURANCE  Bicycle x 5'    COOL DOWN x np  Walk fwd/bwd/lat    Home Exercises Provided and Patient Education Provided     Education provided:   - Home walking program for cardiorespiratory endurance    Written Home Exercises Provided: Patient instructed to cont prior HEP.  Exercises were reviewed and Raffy was able to demonstrate them prior to the end of the session.  Raffy " demonstrated good  understanding of the education provided.     See EMR under Patient Instructions for exercises provided prior visit.    Assessment     Pt was able to complete all exercises with no c/o increased LBP or radicular symptoms. Pt required v/c to engage core/glutes throughout tx but demonstrated proper form with each exercise. Core remains weak. Pt was able to complete most balance exercises with single or no UE support. Pt has no R hip ext past neutral. Pt has sig difficulty with heel raises d/t weakness. Pt demonstrates steppage gait with R footdrop. Encouraged continued compliance with HEP; pt voiced understanding. No adverse effects reported p tx.     Raffy is progressing well towards his goals.   Pt prognosis is Fair.     Pt will continue to benefit from skilled outpatient physical therapy to address the deficits listed in the problem list box on initial evaluation, provide pt/family education and to maximize pt's level of independence in the home and community environment.     Pt's spiritual, cultural and educational needs considered and pt agreeable to plan of care and goals.    Anticipated barriers to physical therapy: chronicity of pain    Goals:  Short Term Goals: 6 weeks   1. Patient will be independent in HEP & progressions  2. Patient will improve single leg balance to 5 secons on ea limb.     Long Term Goals: 12 weeks   1. Patient will have FOTO limitation score of 55% to demonstrate improved overall function & decreased pain  2. Patient will report reduced pain to 0/10 at rest to increase ability to complete work related duties.  3. Patient will increase R hip/knee MMT to 4/5 to demonstrate improved strength  4. Patient will increase R ankle df to 3+/5 to demonstrate improved strength & decreased gait deviations    Plan   Plan of care Certification: 6/11/2019 to 12/31/2020.     Outpatient aquatic Physical Therapy 1-2 times weekly for  12 weeks to include the following interventions:  manual therapy, aquatic therapy, patient education, therapeutic exercise, therapeutic activities  Patient may be seen by PTA as part of rehabilitation team       Kelsie Rodriguez PTA

## 2020-07-06 ENCOUNTER — TELEPHONE (OUTPATIENT)
Dept: NEUROLOGY | Facility: CLINIC | Age: 70
End: 2020-07-06

## 2020-07-07 ENCOUNTER — CLINICAL SUPPORT (OUTPATIENT)
Dept: REHABILITATION | Facility: HOSPITAL | Age: 70
End: 2020-07-07
Attending: NEUROLOGICAL SURGERY
Payer: COMMERCIAL

## 2020-07-07 DIAGNOSIS — R29.898 WEAKNESS OF BOTH LOWER EXTREMITIES: ICD-10-CM

## 2020-07-07 PROCEDURE — 97112 NEUROMUSCULAR REEDUCATION: CPT

## 2020-07-07 PROCEDURE — 97110 THERAPEUTIC EXERCISES: CPT

## 2020-07-07 NOTE — PROGRESS NOTES
"  Physical Therapy Daily Treatment Note     Name: Raffy Rutherford Jr.  Clinic Number: [id number]    Therapy Diagnosis:   Encounter Diagnosis   Name Primary?    Weakness of both lower extremities      Physician: Nini Rendon MD    Visit Date: 7/7/2020  Physician Orders: PT Eval and Treat pool therapy  Medical Diagnosis from Referral:Z98.890 (ICD-10-CM) - S/P lumbar microdiscectomy  Evaluation Date: 11/20/2019  Authorization Period Expiration: 12/31/2020  Plan of Care Expiration: 12/31/2020  Visit # / Visits authorized: 7/ 12  Total Visits: 1     Time In:  0900  Time Out: 1005  Total Billable Time: 60 minutes     Precautions: fibromyalgia  Procedures: Right MIS approach to the L5-S1  level  2.  L5-S1 laminotomy and microdiscectomy on 10/25/2019    Subjective     Pt reports:His pain has been controlled but he is concerned because his feels like his legs are getting weaker. His knees buckle with extended standing or walking.     He was compliant with home exercise program.  Response to previous treatment: NA  Functional change: NA    Pain: 3/10  Location: Lumbar spine into RLE.    Objective     Raffy received therapeutic exercises to develop strength, endurance, ROM, posture and core stabilization for 20 minutes including:  Recumbent cycle lvl 2.5 10 mintes to improve cardiorespiratory endurance  SLR 4 x 8 ea  Seated LAQ on knee extension machine 10 lbs 4x8 ea    Raffy received the following manual therapy techniques: Joint mobilizations were applied to the: lumabr spine for 0 minutes, including:  -    Raffy participated in neuromuscular re-education activities to improve: Balance, Coordination, Kinesthetic, Proprioception and Posture for 40 minutes. The following activities were included:  Posterior pelvic tilt 10 x 10" -  Bio feedback cuff  Posterior pelvic tilt with marching, cues to decrease diaphragm dependence - Biofeedback cuff  Bridges with cuing for appropriate lumbopelvic control, avoid lumbar extension. " "- 5" hold for 3 x 8  Neurtral spine training in quadruped - NP  Hand heel rocks - NP  Shuttle 50 lbs 4 x 10  Step ups with focus on SLB - NP  Lateral step ups with focus on SLB - NP  Supine multifidus activation with swiss ball  Paloff Press - GTB 3 x 10 ea    Raffy participated in dynamic functional therapeutic activities to improve functional performance for 0  minutes, including:  -    Raffy participated in gait training to improve functional mobility and safety for 0  minutes, including:  -    Home Exercises Provided and Patient Education Provided     Education provided:   - Home walking program for cardiorespiratory endurance    Written Home Exercises Provided: Patient instructed to cont prior HEP.  Exercises were reviewed and Raffy was able to demonstrate them prior to the end of the session.  Raffy demonstrated good  understanding of the education provided.     See EMR under Patient Instructions for exercises provided 6/30/2020    Assessment     Improved ability to complete supine lumbopelvic control exercises.  No radicular symptoms with today's treatment. Progressing quadriceps strength due to complaints of knee buckling.     Raffy is progressing well towards his goals.   Pt prognosis is Fair.     Pt will continue to benefit from skilled outpatient physical therapy to address the deficits listed in the problem list box on initial evaluation, provide pt/family education and to maximize pt's level of independence in the home and community environment.     Pt's spiritual, cultural and educational needs considered and pt agreeable to plan of care and goals.    Anticipated barriers to physical therapy: chronicity of pain    Goals:  Short Term Goals: 6 weeks   1. Patient will be independent in HEP & progressions  2. Patient will improve single leg balance to 5 secons on ea limb.     Long Term Goals: 12 weeks   1. Patient will have FOTO limitation score of 55% to demonstrate improved overall function & " decreased pain  2. Patient will report reduced pain to 0/10 at rest to increase ability to complete work related duties.  3. Patient will increase R hip/knee MMT to 4/5 to demonstrate improved strength  4. Patient will increase R ankle df to 3+/5 to demonstrate improved strength & decreased gait deviations    Plan   Plan of care Certification: 6/11/2019 to 12/31/2020.     Outpatient aquatic Physical Therapy 1-2 times weekly for  12 weeks to include the following interventions: manual therapy, aquatic therapy, patient education, therapeutic exercise, therapeutic activities  Patient may be seen by PTA as part of rehabilitation team       Real Sue, PT, DPT

## 2020-07-07 NOTE — TELEPHONE ENCOUNTER
Hi     Just a reminder regarding appt request for appt with Dr. Millan. Please contact patient's wife, Valarie, to schedule.     Thanks in advance,  Aguilar Solares RN, BSN, BS  Clinical Care Coordinator  [phone]

## 2020-07-08 ENCOUNTER — TELEPHONE (OUTPATIENT)
Dept: NEUROSURGERY | Facility: CLINIC | Age: 70
End: 2020-07-08

## 2020-07-08 DIAGNOSIS — M47.817 SPONDYLOSIS WITHOUT MYELOPATHY OR RADICULOPATHY, LUMBOSACRAL REGION: ICD-10-CM

## 2020-07-08 NOTE — TELEPHONE ENCOUNTER
Spoke to the patient appointment rescheduled. Patient needs refills on his Lyrica 50 Mg, Robaxin 100 mg, and Hydrocodone 5 mg.

## 2020-07-08 NOTE — TELEPHONE ENCOUNTER
----- Message from Kena Goldsmith sent at 7/8/2020  1:41 PM CDT -----  Contact: SELF [phone]  Patient is returning your call.  Please advise.

## 2020-07-09 ENCOUNTER — CLINICAL SUPPORT (OUTPATIENT)
Dept: REHABILITATION | Facility: HOSPITAL | Age: 70
End: 2020-07-09
Attending: NEUROLOGICAL SURGERY
Payer: COMMERCIAL

## 2020-07-09 DIAGNOSIS — R29.898 WEAKNESS OF BOTH LOWER EXTREMITIES: ICD-10-CM

## 2020-07-09 PROCEDURE — 97113 AQUATIC THERAPY/EXERCISES: CPT | Mod: CQ

## 2020-07-09 RX ORDER — HYDROCODONE BITARTRATE AND ACETAMINOPHEN 5; 325 MG/1; MG/1
1 TABLET ORAL EVERY 12 HOURS PRN
Qty: 60 TABLET | Refills: 0 | Status: ON HOLD | OUTPATIENT
Start: 2020-07-09 | End: 2020-08-22 | Stop reason: HOSPADM

## 2020-07-09 RX ORDER — PREGABALIN 50 MG/1
50 CAPSULE ORAL 2 TIMES DAILY
Qty: 60 CAPSULE | Refills: 5 | Status: SHIPPED | OUTPATIENT
Start: 2020-07-09 | End: 2020-09-30

## 2020-07-09 RX ORDER — METHOCARBAMOL 750 MG/1
750 TABLET, FILM COATED ORAL 3 TIMES DAILY PRN
Qty: 90 TABLET | Refills: 11 | Status: SHIPPED | OUTPATIENT
Start: 2020-07-09 | End: 2021-06-09

## 2020-07-09 NOTE — PROGRESS NOTES
"  Physical Therapy Daily Treatment Note     Name: Raffy Rutherford Jr.  Clinic Number: [id number]    Therapy Diagnosis:   Encounter Diagnosis   Name Primary?    Weakness of both lower extremities      Physician: Nini Rendon MD    Visit Date: 7/9/2020  Physician Orders: PT Eval and Treat pool therapy  Medical Diagnosis from Referral:Z98.890 (ICD-10-CM) - S/P lumbar microdiscectomy  Evaluation Date: 11/20/2019  Authorization Period Expiration: 12/31/2020  Plan of Care Expiration: 12/31/2020  Visit # / Visits authorized: 8/12     Time In: 1330  Time Out: 1423  Total Billable Time: 23 minutes     Precautions: fibromyalgia  Procedures: Right MIS approach to the L5-S1  level  2.  L5-S1 laminotomy and microdiscectomy on 10/25/2019    Subjective     Pt reports: Pt returns to therapy this pm with c/o continued LBP with radicular symptoms down RLE.     He was compliant with home exercise program.  Response to previous treatment: NA  Functional change: NA    Pain: 7/10  Location: Lumbar spine into RLE    Objective     Raffy received aquatic therapeutic exercises to develop strength, endurance, ROM, flexibility, posture, core stabilization and balance for 45 minutes including:    WARMUP x 2 laps each  Walk fwd/bwd/lat    STRETCHES 3 x 20" moiz  HS  Quads    LE EX x 20 s UE support  Heel raise  Hip abd/add c cuffs  Hip flx/ext c cuffs  Mini squat - np  Leg press c blk noodle  Lateral steps c blue tb x 3 laps  Lateral monster walks c blue tb x 3 laps  Lunges x 2 laps    CORE x 20  Mini squat c push/pull  Shoulder flx/ext c closed paddles - np  Shoulder hor abd/add c closed paddles - np    ENDURANCE  Bicycle x 5'    COOL DOWN x 1 lap each  Toe walking fwd/bwd/lat    Home Exercises Provided and Patient Education Provided     Education provided:   - Home walking program for cardiorespiratory endurance    Written Home Exercises Provided: Patient instructed to cont prior HEP.  Exercises were reviewed and Raffy was able to " demonstrate them prior to the end of the session.  Raffy demonstrated good  understanding of the education provided.     See EMR under Patient Instructions for exercises provided prior visit.    Assessment     Pt was able to complete all exercises with no c/o increased LBP or radicular symptoms. Pt was able to complete most balance exercises with single or no UE support; noted LOB during lunges and exercises c cuffs. Pt has continued difficulty with heel raises d/t weakness. Pt demonstrates steppage gait with R footdrop. Encouraged continued compliance with HEP; pt voiced understanding. No adverse effects reported p tx.     Raffy is progressing well towards his goals.   Pt prognosis is Fair.     Pt will continue to benefit from skilled outpatient physical therapy to address the deficits listed in the problem list box on initial evaluation, provide pt/family education and to maximize pt's level of independence in the home and community environment.     Pt's spiritual, cultural and educational needs considered and pt agreeable to plan of care and goals.    Anticipated barriers to physical therapy: chronicity of pain    Goals:  Short Term Goals: 6 weeks   1. Patient will be independent in HEP & progressions  2. Patient will improve single leg balance to 5 secons on ea limb.     Long Term Goals: 12 weeks   1. Patient will have FOTO limitation score of 55% to demonstrate improved overall function & decreased pain  2. Patient will report reduced pain to 0/10 at rest to increase ability to complete work related duties.  3. Patient will increase R hip/knee MMT to 4/5 to demonstrate improved strength  4. Patient will increase R ankle df to 3+/5 to demonstrate improved strength & decreased gait deviations    Plan   Plan of care Certification: 6/11/2019 to 12/31/2020.     Outpatient aquatic Physical Therapy 1-2 times weekly for  12 weeks to include the following interventions: manual therapy, aquatic therapy, patient  education, therapeutic exercise, therapeutic activities  Patient may be seen by PTA as part of rehabilitation team       Kelsie Rodriguez PTA

## 2020-07-10 ENCOUNTER — OFFICE VISIT (OUTPATIENT)
Dept: HEMATOLOGY/ONCOLOGY | Facility: CLINIC | Age: 70
End: 2020-07-10
Payer: COMMERCIAL

## 2020-07-10 ENCOUNTER — LAB VISIT (OUTPATIENT)
Dept: LAB | Facility: HOSPITAL | Age: 70
End: 2020-07-10
Payer: COMMERCIAL

## 2020-07-10 VITALS
HEART RATE: 56 BPM | DIASTOLIC BLOOD PRESSURE: 66 MMHG | RESPIRATION RATE: 16 BRPM | BODY MASS INDEX: 27.13 KG/M2 | WEIGHT: 179 LBS | HEIGHT: 68 IN | SYSTOLIC BLOOD PRESSURE: 107 MMHG | OXYGEN SATURATION: 98 %

## 2020-07-10 DIAGNOSIS — D50.9 IRON DEFICIENCY ANEMIA, UNSPECIFIED IRON DEFICIENCY ANEMIA TYPE: ICD-10-CM

## 2020-07-10 DIAGNOSIS — D50.9 IRON DEFICIENCY ANEMIA, UNSPECIFIED IRON DEFICIENCY ANEMIA TYPE: Primary | ICD-10-CM

## 2020-07-10 LAB
ALBUMIN SERPL BCP-MCNC: 4 G/DL (ref 3.5–5.2)
ALP SERPL-CCNC: 43 U/L (ref 55–135)
ALT SERPL W/O P-5'-P-CCNC: 28 U/L (ref 10–44)
ANION GAP SERPL CALC-SCNC: 9 MMOL/L (ref 8–16)
AST SERPL-CCNC: 25 U/L (ref 10–40)
BASOPHILS # BLD AUTO: 0.03 K/UL (ref 0–0.2)
BASOPHILS NFR BLD: 0.5 % (ref 0–1.9)
BILIRUB SERPL-MCNC: 0.5 MG/DL (ref 0.1–1)
BUN SERPL-MCNC: 14 MG/DL (ref 8–23)
CALCIUM SERPL-MCNC: 9.6 MG/DL (ref 8.7–10.5)
CHLORIDE SERPL-SCNC: 103 MMOL/L (ref 95–110)
CO2 SERPL-SCNC: 27 MMOL/L (ref 23–29)
CREAT SERPL-MCNC: 0.8 MG/DL (ref 0.5–1.4)
DIFFERENTIAL METHOD: ABNORMAL
EOSINOPHIL # BLD AUTO: 0.1 K/UL (ref 0–0.5)
EOSINOPHIL NFR BLD: 1.7 % (ref 0–8)
ERYTHROCYTE [DISTWIDTH] IN BLOOD BY AUTOMATED COUNT: ABNORMAL % (ref 11.5–14.5)
EST. GFR  (AFRICAN AMERICAN): >60 ML/MIN/1.73 M^2
EST. GFR  (NON AFRICAN AMERICAN): >60 ML/MIN/1.73 M^2
FERRITIN SERPL-MCNC: 130 NG/ML (ref 20–300)
GLUCOSE SERPL-MCNC: 89 MG/DL (ref 70–110)
HCT VFR BLD AUTO: 45.1 % (ref 40–54)
HGB BLD-MCNC: 14 G/DL (ref 14–18)
IMM GRANULOCYTES # BLD AUTO: 0.02 K/UL (ref 0–0.04)
IMM GRANULOCYTES NFR BLD AUTO: 0.3 % (ref 0–0.5)
IRON SERPL-MCNC: 103 UG/DL (ref 45–160)
LYMPHOCYTES # BLD AUTO: 2.6 K/UL (ref 1–4.8)
LYMPHOCYTES NFR BLD: 40.9 % (ref 18–48)
MCH RBC QN AUTO: 26.8 PG (ref 27–31)
MCHC RBC AUTO-ENTMCNC: 31 G/DL (ref 32–36)
MCV RBC AUTO: 86 FL (ref 82–98)
MONOCYTES # BLD AUTO: 0.5 K/UL (ref 0.3–1)
MONOCYTES NFR BLD: 8.3 % (ref 4–15)
NEUTROPHILS # BLD AUTO: 3.1 K/UL (ref 1.8–7.7)
NEUTROPHILS NFR BLD: 48.3 % (ref 38–73)
NRBC BLD-RTO: 0 /100 WBC
PLATELET # BLD AUTO: 238 K/UL (ref 150–350)
PMV BLD AUTO: 9.6 FL (ref 9.2–12.9)
POTASSIUM SERPL-SCNC: 4.1 MMOL/L (ref 3.5–5.1)
PROT SERPL-MCNC: 7.3 G/DL (ref 6–8.4)
RBC # BLD AUTO: 5.22 M/UL (ref 4.6–6.2)
SATURATED IRON: 31 % (ref 20–50)
SODIUM SERPL-SCNC: 139 MMOL/L (ref 136–145)
TOTAL IRON BINDING CAPACITY: 334 UG/DL (ref 250–450)
TRANSFERRIN SERPL-MCNC: 226 MG/DL (ref 200–375)
WBC # BLD AUTO: 6.36 K/UL (ref 3.9–12.7)

## 2020-07-10 PROCEDURE — 1101F PR PT FALLS ASSESS DOC 0-1 FALLS W/OUT INJ PAST YR: ICD-10-PCS | Mod: CPTII,S$GLB,, | Performed by: NURSE PRACTITIONER

## 2020-07-10 PROCEDURE — 1101F PT FALLS ASSESS-DOCD LE1/YR: CPT | Mod: CPTII,S$GLB,, | Performed by: NURSE PRACTITIONER

## 2020-07-10 PROCEDURE — 1125F PR PAIN SEVERITY QUANTIFIED, PAIN PRESENT: ICD-10-PCS | Mod: S$GLB,,, | Performed by: NURSE PRACTITIONER

## 2020-07-10 PROCEDURE — 99999 PR PBB SHADOW E&M-EST. PATIENT-LVL IV: CPT | Mod: PBBFAC,,, | Performed by: NURSE PRACTITIONER

## 2020-07-10 PROCEDURE — 1125F AMNT PAIN NOTED PAIN PRSNT: CPT | Mod: S$GLB,,, | Performed by: NURSE PRACTITIONER

## 2020-07-10 PROCEDURE — 3008F BODY MASS INDEX DOCD: CPT | Mod: CPTII,S$GLB,, | Performed by: NURSE PRACTITIONER

## 2020-07-10 PROCEDURE — 99213 PR OFFICE/OUTPT VISIT, EST, LEVL III, 20-29 MIN: ICD-10-PCS | Mod: S$GLB,,, | Performed by: NURSE PRACTITIONER

## 2020-07-10 PROCEDURE — 1159F MED LIST DOCD IN RCRD: CPT | Mod: S$GLB,,, | Performed by: NURSE PRACTITIONER

## 2020-07-10 PROCEDURE — 82728 ASSAY OF FERRITIN: CPT

## 2020-07-10 PROCEDURE — 99213 OFFICE O/P EST LOW 20 MIN: CPT | Mod: S$GLB,,, | Performed by: NURSE PRACTITIONER

## 2020-07-10 PROCEDURE — 83540 ASSAY OF IRON: CPT

## 2020-07-10 PROCEDURE — 80053 COMPREHEN METABOLIC PANEL: CPT

## 2020-07-10 PROCEDURE — 85025 COMPLETE CBC W/AUTO DIFF WBC: CPT

## 2020-07-10 PROCEDURE — 3008F PR BODY MASS INDEX (BMI) DOCUMENTED: ICD-10-PCS | Mod: CPTII,S$GLB,, | Performed by: NURSE PRACTITIONER

## 2020-07-10 PROCEDURE — 1159F PR MEDICATION LIST DOCUMENTED IN MEDICAL RECORD: ICD-10-PCS | Mod: S$GLB,,, | Performed by: NURSE PRACTITIONER

## 2020-07-10 PROCEDURE — 99999 PR PBB SHADOW E&M-EST. PATIENT-LVL IV: ICD-10-PCS | Mod: PBBFAC,,, | Performed by: NURSE PRACTITIONER

## 2020-07-10 PROCEDURE — 36415 COLL VENOUS BLD VENIPUNCTURE: CPT

## 2020-07-10 NOTE — PROGRESS NOTES
Subjective:       Patient ID: Raffy Rutherford Jr.    Chief Complaint: Anemia    HPI     Raffy Rutherford Jr. is a 70 y.o. male, patient of Dr. Bianchi, to clinic for follow up and to review labs for iron deficenicy anemia.  He continues to feel fatigued and have lower back pain.  He is participating in physical therapy and following up with neurology.    Review of Systems   Constitutional: Positive for fatigue. Negative for activity change, appetite change, chills, fever and unexpected weight change.   HENT: Negative for congestion, hearing loss, mouth sores, sore throat and trouble swallowing.    Eyes: Negative for pain and visual disturbance.   Respiratory: Negative for cough, shortness of breath and wheezing.    Cardiovascular: Negative for chest pain, palpitations and leg swelling.   Gastrointestinal: Negative for abdominal pain, constipation, diarrhea, nausea and vomiting.   Endocrine: Negative for cold intolerance and heat intolerance.   Genitourinary: Negative for difficulty urinating, discharge, dysuria, enuresis, frequency, hematuria, scrotal swelling and testicular pain.   Musculoskeletal: Positive for back pain. Negative for arthralgias and myalgias.   Skin: Negative for color change, rash and wound.   Allergic/Immunologic: Negative for environmental allergies and food allergies.   Neurological: Negative for weakness, numbness and headaches.   Hematological: Negative for adenopathy. Does not bruise/bleed easily.   Psychiatric/Behavioral: Negative for confusion, hallucinations and sleep disturbance. The patient is not nervous/anxious.    All other systems reviewed and are negative.        Allergies:  Review of patient's allergies indicates:   Allergen Reactions    Alphagan [brimonidine]      Patient taking MASO-B Selective Inhibitor Selegiline (Emsam)    Coumadin [warfarin]      itch    Oxycodone      hiccups    Pennsaid [diclofenac sodium] Rash       Medications:  Current Outpatient Medications    Medication Sig Dispense Refill    butalbital-acetaminophen-caffeine -40 mg (FIORICET, ESGIC) -40 mg per tablet Take 1 tablet by mouth 6 hours as needed for Headaches. 30 tablet 0    clonazePAM (KLONOPIN) 0.5 MG tablet Take two tablets by mouth at bedtime and then one tablet by mouth as needed for tinnitus. 75 tablet 5    cyanocobalamin 1,000 mcg/mL injection       diclofenac sodium (VOLTAREN) 1 % Gel Apply 2 g topically once daily. 100 g 0    famotidine (PEPCID) 20 MG tablet TAKE 1 TABLET BY MOUTH TWICE A DAY 60 tablet 12    HYDROcodone-acetaminophen (NORCO) 5-325 mg per tablet Take 1 tablet by mouth every 12 (twelve) hours as needed for Pain (moderate to severe). 60 tablet 0    lamoTRIgine (LAMICTAL) 200 MG tablet Take 1 tablet (200 mg total) by mouth once daily. 90 tablet 3    pravastatin (PRAVACHOL) 40 MG tablet Take 1 tablet (40 mg total) by mouth once daily. 90 tablet 3    pregabalin (LYRICA) 50 MG capsule Take 1 capsule (50 mg total) by mouth 2 (two) times daily. 60 capsule 5    RABEprazole (ACIPHEX) 20 mg tablet Take 1 tablet (20 mg total) by mouth 2 (two) times daily. 180 tablet 3    selegiline (EMSAM) 12 mg/24 hr Place 1 new patch onto the skin once daily. 90 patch 3    senna-docusate 8.6-50 mg (PERICOLACE) 8.6-50 mg per tablet Take 2 tablets by mouth nightly as needed for Constipation.      sucralfate (CARAFATE) 1 gram tablet TAKE 1 TABLET BY MOUTH 4 TIMES A  tablet 4    traZODone (DESYREL) 100 MG tablet Take 1 tablet (100 mg total) by mouth every evening. 90 tablet 3    ubrogepant (UBRELVY)tablet 50 mg Take 1 tablet (50 mg total) by mouth as needed for Migraine. (Patient taking differently: Take 50 mg by mouth once as needed for Migraine. ) 10 tablet 1    methocarbamoL (ROBAXIN) 750 MG Tab Take 1 tablet (750 mg total) by mouth 3 (three) times daily as needed. (Patient not taking: Reported on 7/10/2020) 90 tablet 11    tadalafil (CIALIS) 5 MG tablet Take 1 tablet (5 mg  total) by mouth once daily. 30 tablet 12     No current facility-administered medications for this visit.        PMH:  Past Medical History:   Diagnosis Date    Adenomatous polyp 2003    Adenomatous polyp     Allergy     Arthritis     Back pain     after trauma beginning in 195    Cataract     Chronic pain     neck and left shoulder    Cluster headache 2013    Colon polyp     Degenerative disc disease     Depression     Diverticulitis 12/2013    Elevated PSA     Fibromyalgia 2013    GERD (gastroesophageal reflux disease)     Hepatitis 1970's    A    History of prostate biopsy 2002    Hyperlipidemia     Joint pain     Sleep apnea     Special screening for malignant neoplasms, colon 5/5/2014    Thyroid nodule 7/16/2014    Visual disturbance 2012    problems after cataract surgery       PSH:  Past Surgical History:   Procedure Laterality Date    BACK SURGERY      CATARACT EXTRACTION W/  INTRAOCULAR LENS IMPLANT Bilateral     CHOLECYSTECTOMY      COLONOSCOPY N/A 12/17/2019    Procedure: COLONOSCOPY;  Surgeon: Amadou Hardin MD;  [facility]  Service: Endoscopy;  Laterality: N/A;  pt request to do Miralax bowel prep-BB    COSMETIC SURGERY  2/10/2015    Direct mid-forehead brow lift    COSMETIC SURGERY  2/10/2015    Bilateral upper lid blepahroplasty    ESOPHAGOGASTRODUODENOSCOPY N/A 12/17/2019    Procedure: ESOPHAGOGASTRODUODENOSCOPY (EGD);  Surgeon: Amadou Hardin MD;  Location: [facility] ([address] [place] [address]);  Service: Endoscopy;  Laterality: N/A;    EYE SURGERY      HEMORRHOID SURGERY      with complication of chronic bleeding for 6 weeks     INJECTION OF STEROID Right 12/6/2018    Procedure: INJECTION, STEROID Right SI Joint Block and Steroid Injection;  Surgeon: Jason Caldwell MD;  Location: [facility];  Service: Neurosurgery;  Laterality: Right;  Procedure: Right SI Joint Block and Steroid Injection  Surgery Time: 30 MIN  LOS: 0  Anesthesia: MAC  Radiology: C-arm  Bed:  Favian 4 Poster  Position: Prone    INJECTION OF STEROID Right 9/19/2019    Procedure: INJECTION, STEROID Procedure: Right SI joint block nd steroid injection;  Surgeon: Jasno Caldwell MD;  Location: [facility];  Service: Neurosurgery;  Laterality: Right;  Procedure: Right SI joint block nd steroid injection  Surgery Time: 30 mins  LOS:   Anesthesia: General MAC  Radiology:C-arm  Bed: Regular Bed  Position: Prone    JOINT REPLACEMENT      KNEE SURGERY      involving arthroscopic surgery to both knees    SINUS SURGERY      SINUS SURGERY      left molar and sinus surgery for trigeminal neuralgia    SPINAL FUSION  6/22/2015    L3-L5 XLIF    SPINE SURGERY  6/22/15    XLIF/TANA    TOTAL HIP ARTHROPLASTY  4/2012    Pt states he had total hip replacement on his left hip.       FamHx:  Family History   Problem Relation Age of Onset    Cancer Mother         colon; uterine    Nephrolithiasis Mother     Stroke Mother 86    Pancreatitis Brother     Vision loss Brother         macular degeneration    Diabetes Brother     Macular degeneration Brother     Migraines Sister     No Known Problems Father     No Known Problems Maternal Grandmother     No Known Problems Maternal Grandfather     No Known Problems Paternal Grandmother     No Known Problems Paternal Grandfather     No Known Problems Sister     No Known Problems Maternal Aunt     No Known Problems Maternal Uncle     No Known Problems Paternal Aunt     No Known Problems Paternal Uncle     Melanoma Neg Hx     Amblyopia Neg Hx     Blindness Neg Hx     Cataracts Neg Hx     Glaucoma Neg Hx     Hypertension Neg Hx     Retinal detachment Neg Hx     Strabismus Neg Hx     Thyroid disease Neg Hx     Allergic rhinitis Neg Hx     Allergies Neg Hx     Angioedema Neg Hx     Asthma Neg Hx     Eczema Neg Hx     Urticaria Neg Hx     Rhinitis Neg Hx     Immunodeficiency Neg Hx     Atopy Neg Hx        SocHx:  Social History     Socioeconomic  History    Marital status:      Spouse name: Not on file    Number of children: Not on file    Years of education: Not on file    Highest education level: Not on file   Occupational History    Occupation: Psychotherpist    Social Needs    Financial resource strain: Not hard at all    Food insecurity     Worry: Never true     Inability: Never true    Transportation needs     Medical: No     Non-medical: No   Tobacco Use    Smoking status: Never Smoker    Smokeless tobacco: Never Used   Substance and Sexual Activity    Alcohol use: Yes     Frequency: 4 or more times a week     Drinks per session: 1 or 2     Binge frequency: Never     Comment: occasion    Drug use: No    Sexual activity: Yes     Partners: Female   Lifestyle    Physical activity     Days per week: 0 days     Minutes per session: 0 min    Stress: To some extent   Relationships    Social connections     Talks on phone: More than three times a week     Gets together: Twice a week     Attends [employer] service: Patient refused     Active member of club or organization: Patient refused     Attends meetings of clubs or organizations: Patient refused     Relationship status:    Other Topics Concern    Not on file   Social History Narrative    Not on file       Distress Score    Distress Score: 3        Practical Problems Physical Problems   : No Appearance: No   Housing: No Bathing / Dressing: No   Insurance / Financial: No Breathing: No    Transportation: No  Changes in Urination: No    Work / School: No  Constipation: No   Treatment Decisions: No  Diarrhea: No     Eating: No    Family Problems Fatigue: No    Dealing with Children: No Feeling Swollen: No    Dealing with Partner: No Fevers: No    Ability to Have Children: No  Getting Around: No    Family Health Issues: No  Indigestion: No     Memory / Concentration: No   Emotional Problems Mouth Sores: No    Depression: No  Nausea: No    Fears: No  Nose Dry /  Congested: No    Nervousness: Yes  Pain: No    Sadness: No Sexual: No    Worry: No Skin Dry / Itchy: No    Loss of Interest in Usual Activities: No Sleep: No     Substance Abuse: No    Spiritual/Religions Concerns Tingling in Hands / Feet: No   Spritual / Baptist Concerns: No         Other Problems                Objective:      Vitals:    07/10/20 1123   BP: 107/66   Pulse: (!) 56   Resp: 16     Physical Exam   Constitutional: He is oriented to person, place, and time and well-developed, well-nourished, and in no distress.   HENT:   Head: Normocephalic and atraumatic.   Eyes: Pupils are equal, round, and reactive to light. EOM are normal.   Neck: Normal range of motion.   Pulmonary/Chest: Effort normal. No respiratory distress.   Neurological: He is alert and oriented to person, place, and time    LABS:  WBC   Date Value Ref Range Status   07/10/2020 6.36 3.90 - 12.70 K/uL Final     Hemoglobin   Date Value Ref Range Status   07/10/2020 14.0 14.0 - 18.0 g/dL Final     Hematocrit   Date Value Ref Range Status   07/10/2020 45.1 40.0 - 54.0 % Final     Platelets   Date Value Ref Range Status   07/10/2020 238 150 - 350 K/uL Final       Chemistry        Component Value Date/Time     07/10/2020 1014    K 4.1 07/10/2020 1014     07/10/2020 1014    CO2 27 07/10/2020 1014    BUN 14 07/10/2020 1014    CREATININE 0.8 07/10/2020 1014    GLU 89 07/10/2020 1014        Component Value Date/Time    CALCIUM 9.6 07/10/2020 1014    ALKPHOS 43 (L) 07/10/2020 1014    AST 25 07/10/2020 1014    ALT 28 07/10/2020 1014    BILITOT 0.5 07/10/2020 1014    ESTGFRAFRICA >60.0 07/10/2020 1014    EGFRNONAA >60.0 07/10/2020 1014          Results for JAQUI RICHARDSON JR. (MRN [id number]) as of 7/10/2020 11:42   Ref. Range 5/9/2020 11:11 7/10/2020 10:14   Iron Latest Ref Range: 45 - 160 ug/dL 14 (L) 103   TIBC Latest Ref Range: 250 - 450 ug/dL 457 (H) 334   Saturated Iron Latest Ref Range: 20 - 50 % 3 (L) 31   Transferrin Latest Ref Range:  200 - 375 mg/dL 309 226   Ferritin Latest Ref Range: 20.0 - 300.0 ng/mL 5 (L)        Assessment:       1. Iron deficiency anemia, unspecified iron deficiency anemia type          Plan:       1. Iron Deficiency Anemia:    70 y.o. male, patient of Dr. Bianchi, for 6 week follow up after receiving 2 doses of Injectafer for iron deficiency anemia. He tolerated these infusions well and labs show resolution of iron deficiency anemia. He continues to have weakness and fatigue and I have encouraged him to continue follow up with PCP, PT, and neurology as indicated. He knows if his counts begin to drop again on labs that he should contact us for a return visit.     RTC as needed.    Patient is in agreement with the proposed treatment plan. All questions were answered to the patient's satisfaction. Pt knows to call clinic if anything is needed before the next clinic visit.    Valeria Christian, MSN, APRN, FNP-C  Hematology and Medical Oncology  Nurse Practitioner to Dr. Todd Bianchi  Nurse Practitioner, [facility]

## 2020-07-13 ENCOUNTER — PATIENT MESSAGE (OUTPATIENT)
Dept: INTERNAL MEDICINE | Facility: CLINIC | Age: 70
End: 2020-07-13

## 2020-07-13 ENCOUNTER — LAB VISIT (OUTPATIENT)
Dept: INTERNAL MEDICINE | Facility: CLINIC | Age: 70
End: 2020-07-13
Payer: COMMERCIAL

## 2020-07-13 DIAGNOSIS — R05.9 COUGH: ICD-10-CM

## 2020-07-13 PROCEDURE — U0003 INFECTIOUS AGENT DETECTION BY NUCLEIC ACID (DNA OR RNA); SEVERE ACUTE RESPIRATORY SYNDROME CORONAVIRUS 2 (SARS-COV-2) (CORONAVIRUS DISEASE [COVID-19]), AMPLIFIED PROBE TECHNIQUE, MAKING USE OF HIGH THROUGHPUT TECHNOLOGIES AS DESCRIBED BY CMS-2020-01-R: HCPCS

## 2020-07-14 LAB — SARS-COV-2 RNA RESP QL NAA+PROBE: NOT DETECTED

## 2020-07-15 ENCOUNTER — CLINICAL SUPPORT (OUTPATIENT)
Dept: REHABILITATION | Facility: HOSPITAL | Age: 70
End: 2020-07-15
Attending: NEUROLOGICAL SURGERY
Payer: COMMERCIAL

## 2020-07-15 DIAGNOSIS — R29.898 WEAKNESS OF BOTH LOWER EXTREMITIES: ICD-10-CM

## 2020-07-15 PROCEDURE — 97112 NEUROMUSCULAR REEDUCATION: CPT

## 2020-07-15 NOTE — PROGRESS NOTES
"  Physical Therapy Daily Treatment Note     Name: Raffy Rutherford Jr.  Clinic Number: [id number]    Therapy Diagnosis:   Encounter Diagnosis   Name Primary?    Weakness of both lower extremities      Physician: Nini Rendon MD    Visit Date: 7/15/2020  Physician Orders: PT Eval and Treat pool therapy  Medical Diagnosis from Referral:Z98.890 (ICD-10-CM) - S/P lumbar microdiscectomy  Evaluation Date: 11/20/2019  Authorization Period Expiration: 12/31/2020  Plan of Care Expiration: 12/31/2020  Visit # / Visits authorized: 8/ 12       Time In:  1430  Time Out: 1515  Total Billable Time: 45 minutes     Precautions: fibromyalgia  Procedures: Right MIS approach to the L5-S1  level  2.  L5-S1 laminotomy and microdiscectomy on 10/25/2019    Subjective     Pt reports:He had a mild increase in pain following the previous session.     He was compliant with home exercise program.  Response to previous treatment: NA  Functional change: NA    Pain: 3/10  Location: Lumbar spine into RLE.    Objective     Raffy received therapeutic exercises to develop strength, endurance, ROM, posture and core stabilization for 0 minutes including:      Raffy received the following manual therapy techniques: Joint mobilizations were applied to the: lumabr spine for 0 minutes, including:  -    Raffy participated in neuromuscular re-education activities to improve: Balance, Coordination, Kinesthetic, Proprioception and Posture for 45 minutes. The following activities were included:  Posterior pelvic tilt 10 x 10" -  Bio feedback cuff  Posterior pelvic tilt with bent knee fall out, cues to decrease diaphragm dependence - Biofeedback cuff  Bridges with cuing for appropriate lumbopelvic control, avoid lumbar extension. - 5" hold for 3 x 8  Supine multifidus activation with swiss ball  Paloff Press - GTB 3 x 10 ea  FMP for lumbar flexion  Quadruped lumbo pelvic control and hand heel rock    Raffy participated in dynamic functional therapeutic " activities to improve functional performance for 0  minutes, including:  -    Raffy participated in gait training to improve functional mobility and safety for 0  minutes, including:  -    Home Exercises Provided and Patient Education Provided     Education provided:   - Home walking program for cardiorespiratory endurance    Written Home Exercises Provided: Patient instructed to cont prior HEP.  Exercises were reviewed and Raffy was able to demonstrate them prior to the end of the session.  Raffy demonstrated good  understanding of the education provided.     See EMR under Patient Instructions for exercises provided 6/30/2020    Assessment     Responded well to FMP for lumbar flexion displaying improved ROM and decreased pain. Session focused on lumbo pelvic control from a stable position. He continues to require tactile cuing for lumbar motion otherwise he will stabilize with his diaphragam. His aquatic therapy sessions will continue to progress gross strengthening and endurance.    Raffy is progressing well towards his goals.   Pt prognosis is Fair.     Pt will continue to benefit from skilled outpatient physical therapy to address the deficits listed in the problem list box on initial evaluation, provide pt/family education and to maximize pt's level of independence in the home and community environment.     Pt's spiritual, cultural and educational needs considered and pt agreeable to plan of care and goals.    Anticipated barriers to physical therapy: chronicity of pain    Goals:  Short Term Goals: 6 weeks   1. Patient will be independent in HEP & progressions  2. Patient will improve single leg balance to 5 secons on ea limb.     Long Term Goals: 12 weeks   1. Patient will have FOTO limitation score of 55% to demonstrate improved overall function & decreased pain  2. Patient will report reduced pain to 0/10 at rest to increase ability to complete work related duties.  3. Patient will increase R hip/knee  MMT to 4/5 to demonstrate improved strength  4. Patient will increase R ankle df to 3+/5 to demonstrate improved strength & decreased gait deviations    Plan   Plan of care Certification: 6/11/2019 to 12/31/2020.     Outpatient aquatic Physical Therapy 1-2 times weekly for  12 weeks to include the following interventions: manual therapy, aquatic therapy, patient education, therapeutic exercise, therapeutic activities  Patient may be seen by PTA as part of rehabilitation team       Real Sue, PT, DPT

## 2020-07-17 ENCOUNTER — CLINICAL SUPPORT (OUTPATIENT)
Dept: REHABILITATION | Facility: HOSPITAL | Age: 70
End: 2020-07-17
Attending: NEUROLOGICAL SURGERY
Payer: COMMERCIAL

## 2020-07-17 DIAGNOSIS — R29.898 WEAKNESS OF BOTH LOWER EXTREMITIES: ICD-10-CM

## 2020-07-17 PROCEDURE — 97112 NEUROMUSCULAR REEDUCATION: CPT | Mod: CQ

## 2020-07-17 NOTE — PROGRESS NOTES
"  Physical Therapy Daily Treatment Note     Name: Raffy Rutherford Jr.  Clinic Number: [id number]    Therapy Diagnosis:   Encounter Diagnosis   Name Primary?    Weakness of both lower extremities      Physician: Nini Rendon MD    Visit Date: 7/17/2020  Physician Orders: PT Eval and Treat pool therapy  Medical Diagnosis from Referral:Z98.890 (ICD-10-CM) - S/P lumbar microdiscectomy  Evaluation Date: 11/20/2019  Authorization Period Expiration: 12/31/2020  Plan of Care Expiration: 12/31/2020  Visit # / Visits authorized: 10/12       Time In: 1135  Time Out: 1220  Total Billable Time: 45 minutes     Precautions: fibromyalgia  Procedures: Right MIS approach to the L5-S1  level  2.  L5-S1 laminotomy and microdiscectomy on 10/25/2019    Subjective     Pt reports: Pt returns to therapy this am with c/o continued LBP but sig L knee pain upon entry. He states he was playing his cello when it starting hurting with no known mechanism of injury.     He was compliant with home exercise program.  Response to previous treatment: NA  Functional change: NA    Pain: 8/10 L knee  Location: Lumbar spine into RLE.    Objective     Raffy received therapeutic exercises to develop strength, endurance, ROM, posture and core stabilization for 0 minutes including:      Raffy received the following manual therapy techniques: Joint mobilizations were applied to the: lumabr spine for 0 minutes, including:      Raffy participated in neuromuscular re-education activities to improve: Balance, Coordination, Kinesthetic, Proprioception and Posture for 45 minutes. The following activities were included:    TA bracing 10" hold x 2'  TA marching x 2'  TA BKFO x 2'  TA 90/90 2 x 10 x 10"  TA bracing  SLR 2 x 10 moiz  Bridges c marching 2 x 10   Paloff press c green tb 15 x 5" moiz  Supine multifidus activation with swiss ball - np  FMP for lumbar flexion - np  Quadruped lumbo pelvic control and hand heel rock - np    Raffy participated in dynamic " functional therapeutic activities to improve functional performance for 0  minutes, including:      Raffy participated in gait training to improve functional mobility and safety for 0  minutes, including:      Home Exercises Provided and Patient Education Provided     Education provided:   - Home walking program for cardiorespiratory endurance    Written Home Exercises Provided: Patient instructed to cont prior HEP.  Exercises were reviewed and Raffy was able to demonstrate them prior to the end of the session.  Raffy demonstrated good  understanding of the education provided.     See EMR under Patient Instructions for exercises provided 6/30/2020    Assessment     Pt was able to complete all exercises with no c/o increased lumbar/knee pain. Pt responded well to verbal and tactile cues for proper form during core stabilization exercises with little to no lumbar rotation noted during TA marching/BKFO. Min L hip drop/lumbar rotation during marching bridges. Encouraged continued compliance with HEP; pt voiced understanding. No adverse effects reported p tx.     Raffy is progressing well towards his goals.   Pt prognosis is Fair.     Pt will continue to benefit from skilled outpatient physical therapy to address the deficits listed in the problem list box on initial evaluation, provide pt/family education and to maximize pt's level of independence in the home and community environment.     Pt's spiritual, cultural and educational needs considered and pt agreeable to plan of care and goals.    Anticipated barriers to physical therapy: chronicity of pain    Goals:  Short Term Goals: 6 weeks   1. Patient will be independent in HEP & progressions  2. Patient will improve single leg balance to 5 secons on ea limb.     Long Term Goals: 12 weeks   1. Patient will have FOTO limitation score of 55% to demonstrate improved overall function & decreased pain  2. Patient will report reduced pain to 0/10 at rest to increase  ability to complete work related duties.  3. Patient will increase R hip/knee MMT to 4/5 to demonstrate improved strength  4. Patient will increase R ankle df to 3+/5 to demonstrate improved strength & decreased gait deviations    Plan   Plan of care Certification: 6/11/2019 to 12/31/2020.     Outpatient aquatic Physical Therapy 1-2 times weekly for  12 weeks to include the following interventions: manual therapy, aquatic therapy, patient education, therapeutic exercise, therapeutic activities  Patient may be seen by PTA as part of rehabilitation team       Kelsie Rodriguez PTA

## 2020-07-20 ENCOUNTER — OFFICE VISIT (OUTPATIENT)
Dept: INTERNAL MEDICINE | Facility: CLINIC | Age: 70
End: 2020-07-20
Payer: COMMERCIAL

## 2020-07-20 ENCOUNTER — HOSPITAL ENCOUNTER (OUTPATIENT)
Dept: RADIOLOGY | Facility: HOSPITAL | Age: 70
Discharge: HOME | End: 2020-07-20
Attending: INTERNAL MEDICINE
Payer: COMMERCIAL

## 2020-07-20 VITALS
BODY MASS INDEX: 27.13 KG/M2 | OXYGEN SATURATION: 97 % | SYSTOLIC BLOOD PRESSURE: 98 MMHG | HEIGHT: 68 IN | WEIGHT: 179 LBS | HEART RATE: 51 BPM | DIASTOLIC BLOOD PRESSURE: 60 MMHG

## 2020-07-20 DIAGNOSIS — R05.9 COUGH: ICD-10-CM

## 2020-07-20 DIAGNOSIS — R05.9 COUGH: Primary | ICD-10-CM

## 2020-07-20 PROCEDURE — 71046 X-RAY EXAM CHEST 2 VIEWS: CPT | Mod: TC

## 2020-07-20 PROCEDURE — 71046 XR CHEST PA AND LATERAL: ICD-10-PCS | Mod: 26,,, | Performed by: RADIOLOGY

## 2020-07-20 PROCEDURE — 3008F BODY MASS INDEX DOCD: CPT | Mod: CPTII,S$GLB,, | Performed by: INTERNAL MEDICINE

## 2020-07-20 PROCEDURE — 1159F PR MEDICATION LIST DOCUMENTED IN MEDICAL RECORD: ICD-10-PCS | Mod: S$GLB,,, | Performed by: INTERNAL MEDICINE

## 2020-07-20 PROCEDURE — 1101F PR PT FALLS ASSESS DOC 0-1 FALLS W/OUT INJ PAST YR: ICD-10-PCS | Mod: CPTII,S$GLB,, | Performed by: INTERNAL MEDICINE

## 2020-07-20 PROCEDURE — 1159F MED LIST DOCD IN RCRD: CPT | Mod: S$GLB,,, | Performed by: INTERNAL MEDICINE

## 2020-07-20 PROCEDURE — 1101F PT FALLS ASSESS-DOCD LE1/YR: CPT | Mod: CPTII,S$GLB,, | Performed by: INTERNAL MEDICINE

## 2020-07-20 PROCEDURE — 99999 PR PBB SHADOW E&M-EST. PATIENT-LVL V: ICD-10-PCS | Mod: PBBFAC,,, | Performed by: INTERNAL MEDICINE

## 2020-07-20 PROCEDURE — 1125F PR PAIN SEVERITY QUANTIFIED, PAIN PRESENT: ICD-10-PCS | Mod: S$GLB,,, | Performed by: INTERNAL MEDICINE

## 2020-07-20 PROCEDURE — 99999 PR PBB SHADOW E&M-EST. PATIENT-LVL V: CPT | Mod: PBBFAC,,, | Performed by: INTERNAL MEDICINE

## 2020-07-20 PROCEDURE — 3008F PR BODY MASS INDEX (BMI) DOCUMENTED: ICD-10-PCS | Mod: CPTII,S$GLB,, | Performed by: INTERNAL MEDICINE

## 2020-07-20 PROCEDURE — 71046 X-RAY EXAM CHEST 2 VIEWS: CPT | Mod: 26,,, | Performed by: RADIOLOGY

## 2020-07-20 PROCEDURE — 99213 PR OFFICE/OUTPT VISIT, EST, LEVL III, 20-29 MIN: ICD-10-PCS | Mod: S$GLB,,, | Performed by: INTERNAL MEDICINE

## 2020-07-20 PROCEDURE — 99213 OFFICE O/P EST LOW 20 MIN: CPT | Mod: S$GLB,,, | Performed by: INTERNAL MEDICINE

## 2020-07-20 PROCEDURE — 1125F AMNT PAIN NOTED PAIN PRSNT: CPT | Mod: S$GLB,,, | Performed by: INTERNAL MEDICINE

## 2020-07-20 NOTE — PROGRESS NOTES
Subjective:      Patient ID: Raffy Rutherford Jr. is a 70 y.o. male.    Chief Complaint: Follow-up    HPI:  HPI   Cough began about 2 weeks ago became more persistent. He is Covid negative, finds that if he hold his breath he can only do it for a couple of seconds. It is dry, doesn't hurt unless he tries to hold his breath. His oxygen level is good. He has allergies and is using zyrtec twice a day and flonase. He has not used tessalon perles.  Patient Active Problem List   Diagnosis    Depression    Hyperlipidemia    Chronic pain    Colon polyp    Adenomatous polyp    History of prostate biopsy    GERD (gastroesophageal reflux disease)    Back pain    Sleep apnea    Visual disturbances    Spondylosis without myelopathy    Degeneration of lumbar or lumbosacral intervertebral disc    Spinal stenosis, lumbar region, without neurogenic claudication    Thoracic or lumbosacral neuritis or radiculitis, unspecified    Displacement of lumbar intervertebral disc without myelopathy    Acquired spondylolisthesis    Lumbago    Status post cataract extraction and insertion of intraocular lens - Both Eyes    Prostatitis, acute    Fibromyalgia    OP (osteoporosis)    Compression fracture of T12 vertebra    Diverticulitis large intestine    Osteoarthritis    Lower back pain    Lower extremity pain    Muscle weakness    Range of motion deficit    Special screening for malignant neoplasms, colon    Cervicalgia    Facet joint disease of cervical region    Occipital neuralgia    Chronic migraine without aura, with intractable migraine, so stated, with status migrainosus    Cervical radiculopathy    Paroxysmal hemicrania    Sciatic nerve pain    Chronic LBP    DJD (degenerative joint disease) of lumbar spine    Chronic neck pain    Right lumbar radiculopathy    Thyroid nodule    Carpal tunnel syndrome of right wrist    Paresthesia    Brow ptosis    Degenerative disc disease    Encounter for  postoperative wound check    Lumbar radiculopathy    Cervical spondylosis    S/P lumbar spinal fusion    Right wrist tendinitis    Pain in right wrist    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    Headache around the eyes    Closed fracture of left distal radius    Pain in left wrist    Bilateral foot-drop    Idiopathic peripheral neuropathy    Sacroiliac joint dysfunction of right side    SI (sacroiliac) joint dysfunction    Episodic migraine without status migrainosus, not intractable    Lumbar disc herniation with radiculopathy    Anxiety about health    MDD (major depressive disorder), recurrent episode, moderate    Anxiety    Weakness of both lower extremities    History of colon polyps    Iron deficiency anemia    Sicca syndrome with keratoconjunctivitis     Past Medical History:   Diagnosis Date    Adenomatous polyp 2003    Adenomatous polyp     Allergy     Arthritis     Back pain     after trauma beginning in 195    Cataract     Chronic pain     neck and left shoulder    Cluster headache 2013    Colon polyp     Degenerative disc disease     Depression     Diverticulitis 12/2013    Elevated PSA     Fibromyalgia 2013    GERD (gastroesophageal reflux disease)     Hepatitis 1970's    A    History of prostate biopsy 2002    Hyperlipidemia     Joint pain     Sleep apnea     Special screening for malignant neoplasms, colon 5/5/2014    Thyroid nodule 7/16/2014    Visual disturbance 2012    problems after cataract surgery     Past Surgical History:   Procedure Laterality Date    BACK SURGERY      CATARACT EXTRACTION W/  INTRAOCULAR LENS IMPLANT Bilateral     CHOLECYSTECTOMY      COLONOSCOPY N/A 12/17/2019    Procedure: COLONOSCOPY;  Surgeon: Amadou Hardin MD;  Location: [facility] ([address] [place] [address]);  Service: Endoscopy;  Laterality: N/A;  pt request to do Miralax bowel prep-BB    COSMETIC SURGERY  2/10/2015    Direct mid-forehead brow lift    COSMETIC SURGERY   2/10/2015    Bilateral upper lid blepahroplasty    ESOPHAGOGASTRODUODENOSCOPY N/A 12/17/2019    Procedure: ESOPHAGOGASTRODUODENOSCOPY (EGD);  Surgeon: Amadou Hardin MD;  Location: [address]);  Service: Endoscopy;  Laterality: N/A;    EYE SURGERY      HEMORRHOID SURGERY      with complication of chronic bleeding for 6 weeks     INJECTION OF STEROID Right 12/6/2018    Procedure: INJECTION, STEROID Right SI Joint Block and Steroid Injection;  Surgeon: Jason Caldwell MD;  Location: [facility];  Service: Neurosurgery;  Laterality: Right;  Procedure: Right SI Joint Block and Steroid Injection  Surgery Time: 30 MIN  LOS: 0  Anesthesia: MAC  Radiology: C-arm  Bed: [place] [address] Poster  Position: Prone    INJECTION OF STEROID Right 9/19/2019    Procedure: INJECTION, STEROID Procedure: Right SI joint block nd steroid injection;  Surgeon: Jason Caldwell MD;  Location: [facility];  Service: Neurosurgery;  Laterality: Right;  Procedure: Right SI joint block nd steroid injection  Surgery Time: 30 mins  LOS:   Anesthesia: General MAC  Radiology:C-arm  Bed: Regular Bed  Position: Prone    JOINT REPLACEMENT      KNEE SURGERY      involving arthroscopic surgery to both knees    SINUS SURGERY      SINUS SURGERY      left molar and sinus surgery for trigeminal neuralgia    SPINAL FUSION  6/22/2015    L3-L5 XLIF    SPINE SURGERY  6/22/15    XLIF/TANA    TOTAL HIP ARTHROPLASTY  4/2012    Pt states he had total hip replacement on his left hip.     Family History   Problem Relation Age of Onset    Cancer Mother         colon; uterine    Nephrolithiasis Mother     Stroke Mother 86    Pancreatitis Brother     Vision loss Brother         macular degeneration    Diabetes Brother     Macular degeneration Brother     Migraines Sister     No Known Problems Father     No Known Problems Maternal Grandmother     No Known Problems Maternal Grandfather     No Known Problems Paternal Grandmother     No Known Problems  "Paternal Grandfather     No Known Problems Sister     No Known Problems Maternal Aunt     No Known Problems Maternal Uncle     No Known Problems Paternal Aunt     No Known Problems Paternal Uncle     Melanoma Neg Hx     Amblyopia Neg Hx     Blindness Neg Hx     Cataracts Neg Hx     Glaucoma Neg Hx     Hypertension Neg Hx     Retinal detachment Neg Hx     Strabismus Neg Hx     Thyroid disease Neg Hx     Allergic rhinitis Neg Hx     Allergies Neg Hx     Angioedema Neg Hx     Asthma Neg Hx     Eczema Neg Hx     Urticaria Neg Hx     Rhinitis Neg Hx     Immunodeficiency Neg Hx     Atopy Neg Hx      Review of Systems   Constitutional: Negative for activity change, chills, fever and unexpected weight change.   Respiratory: Positive for cough. Negative for chest tightness, shortness of breath and wheezing.    Cardiovascular: Negative for chest pain, palpitations and leg swelling.     Objective:     Vitals:    07/20/20 0946   BP: 98/60   Pulse: (!) 51   SpO2: 97%   Weight: 81.2 kg (179 lb 0.2 oz)   Height: 5' 8" (1.727 m)   PainSc:   7     Body mass index is 27.22 kg/m².  Physical Exam  Constitutional:       General: He is not in acute distress.     Appearance: He is well-developed.   Neck:      Thyroid: No thyromegaly.      Vascular: No carotid bruit.   Cardiovascular:      Rate and Rhythm: Normal rate and regular rhythm.      Heart sounds: Normal heart sounds.   Pulmonary:      Effort: Pulmonary effort is normal. No respiratory distress.      Breath sounds: Normal breath sounds.       Assessment:     1. Cough      Plan:   Raffy was seen today for follow-up.    Diagnoses and all orders for this visit:    Cough  -     X-Ray Chest PA And Lateral; Future    I am aware that the patient is scheduled for Neurosurgery:  LAB , EKG and CXR are reviewed and acceptable for surgery. Please be aware that the patient has a diagnosis of sleep apnea.    Problem List Items Addressed This Visit     None      Visit " Diagnoses     Cough    -  Primary    Relevant Orders    X-Ray Chest PA And Lateral (Completed)        Orders Placed This Encounter   Procedures    X-Ray Chest PA And Lateral     Standing Status:   Future     Number of Occurrences:   1     Standing Expiration Date:   9/18/2020     Follow up if symptoms worsen or fail to improve.     Medication List          Accurate as of July 20, 2020  4:19 PM. If you have any questions, ask your nurse or doctor.            CHANGE how you take these medications    ubrogepant 50 mg tablet  Generic drug: ubrogepant  Take 1 tablet (50 mg total) by mouth as needed for Migraine.  What changed: when to take this        CONTINUE taking these medications    butalbital-acetaminophen-caffeine -40 mg -40 mg per tablet  Commonly known as: FIORICET, ESGIC  Take 1 tablet by mouth 6 hours as needed for Headaches.     clonazePAM 0.5 MG tablet  Commonly known as: KLONOPIN  Take two tablets by mouth at bedtime and then one tablet by mouth as needed for tinnitus.     cyanocobalamin 1,000 mcg/mL injection     diclofenac sodium 1 % Gel  Commonly known as: VOLTAREN  Apply 2 g topically once daily.     EMSAM 12 mg/24 hr  Generic drug: selegiline  Place 1 new patch onto the skin once daily.     famotidine 20 MG tablet  Commonly known as: PEPCID  TAKE 1 TABLET BY MOUTH TWICE A DAY     HYDROcodone-acetaminophen 5-325 mg per tablet  Commonly known as: NORCO  Take 1 tablet by mouth every 12 (twelve) hours as needed for Pain (moderate to severe).     lamoTRIgine 200 MG tablet  Commonly known as: LAMICTAL  Take 1 tablet (200 mg total) by mouth once daily.     methocarbamoL 750 MG Tab  Commonly known as: ROBAXIN  Take 1 tablet (750 mg total) by mouth 3 (three) times daily as needed.     pravastatin 40 MG tablet  Commonly known as: PRAVACHOL  Take 1 tablet (40 mg total) by mouth once daily.     pregabalin 50 MG capsule  Commonly known as: LYRICA  Take 1 capsule (50 mg total) by mouth 2 (two) times  daily.     RABEprazole 20 mg tablet  Commonly known as: ACIPHEX  Take 1 tablet (20 mg total) by mouth 2 (two) times daily.     senna-docusate 8.6-50 mg 8.6-50 mg per tablet  Commonly known as: PERICOLACE  Take 2 tablets by mouth nightly as needed for Constipation.     sucralfate 1 gram tablet  Commonly known as: CARAFATE  TAKE 1 TABLET BY MOUTH 4 TIMES A DAY     tadalafiL 5 MG tablet  Commonly known as: CIALIS  Take 1 tablet (5 mg total) by mouth once daily.     traZODone 100 MG tablet  Commonly known as: DESYREL  Take 1 tablet (100 mg total) by mouth every evening.

## 2020-07-21 ENCOUNTER — CLINICAL SUPPORT (OUTPATIENT)
Dept: REHABILITATION | Facility: HOSPITAL | Age: 70
End: 2020-07-21
Attending: NEUROLOGICAL SURGERY
Payer: COMMERCIAL

## 2020-07-21 ENCOUNTER — PROCEDURE VISIT (OUTPATIENT)
Dept: PHYSICAL MEDICINE AND REHAB | Facility: CLINIC | Age: 70
End: 2020-07-21
Payer: COMMERCIAL

## 2020-07-21 DIAGNOSIS — R26.9 GAIT ABNORMALITY: ICD-10-CM

## 2020-07-21 DIAGNOSIS — R29.898 WEAKNESS OF BOTH LOWER EXTREMITIES: ICD-10-CM

## 2020-07-21 PROCEDURE — 97113 AQUATIC THERAPY/EXERCISES: CPT | Mod: CQ

## 2020-07-21 NOTE — PROCEDURES
Discussed with patient prior to starting test- he has had NCS/EMG at least 5 times since 2013.  The last 2 studies were done by Dr. Jacob at [facility] and are not in our system here (nor available on care everywhere).  Last test as about a year and a half ago.  All 5 tests have showed either or both peripheral polyneuropathy and lumbosacral radiculopathy to some degree.  He would prefer to hold off on the study for now, given the discomfort with the test and the multiple previous studies all showing the same thing.  He will obtain his records from [facility] as well.  I think that this is reasonable.  Will cancel his check in and visit so that he doesn't get charged co-pay.  Discussed with Dr. Mac.

## 2020-07-21 NOTE — PROGRESS NOTES
"  Physical Therapy Daily Treatment Note     Name: Raffy Rutherford Jr.  Clinic Number: [id number]    Therapy Diagnosis:   Encounter Diagnosis   Name Primary?    Weakness of both lower extremities      Physician: Jason Caldwell MD    Visit Date: 7/21/2020  Physician Orders: PT Eval and Treat pool therapy  Medical Diagnosis from Referral:Z98.890 (ICD-10-CM) - S/P lumbar microdiscectomy  Evaluation Date: 11/20/2019  Authorization Period Expiration: 12/31/2020  Plan of Care Expiration: 12/31/2020  Visit # / Visits authorized: 2/20     Time In: 1607  Time Out: 1705  Total Billable Time: 38 minutes     Precautions: fibromyalgia  Procedures: Right MIS approach to the L5-S1  level  2.  L5-S1 laminotomy and microdiscectomy on 10/25/2019    Subjective     Pt reports: Pt returns to therapy this pm with c/o continued 6/10 LBP with radicular symptoms down RLE. He states his L knee is still 7/10 pain.     He was compliant with home exercise program.  Response to previous treatment: NA  Functional change: NA    Pain: 6/10  Location: Lumbar spine into RLE    Objective     Raffy received aquatic therapeutic exercises to develop strength, endurance, ROM, flexibility, posture, core stabilization and balance for 53 minutes including:    WARMUP x 2 laps each  Walk fwd/bwd/lat    STRETCHES 3 x 20" moiz  HS  Quads    LE EX x 20 s UE support  Heel raise  Hip abd/add c cuffs  Hip flx/ext c cuffs  Mini squat - np  Leg press c blk noodle  Lateral steps c blue tb x 3 laps  Lateral monster walks c blue tb x 3 laps  Lunges x 2 laps    CORE x 20  Mini squat c push/pull  Shoulder flx/ext c closed paddles - np  Shoulder hor abd/add c closed paddles - np    ENDURANCE  Bicycle x 5'    COOL DOWN x 1 lap each  Toe walking fwd/bwd/lat    Home Exercises Provided and Patient Education Provided     Education provided:   - Home walking program for cardiorespiratory endurance    Written Home Exercises Provided: Patient instructed to cont prior HEP.  Exercises " were reviewed and Raffy was able to demonstrate them prior to the end of the session.  Raffy demonstrated good  understanding of the education provided.     See EMR under Patient Instructions for exercises provided prior visit.    Assessment     Pt was able to complete all exercises with no c/o increased LBP or radicular symptoms. Pt was able to complete most balance exercises with single UE support; continued LOB during lunges and exercises c cuffs. Pt required v/c for proper lunge form s excessive compesatory lumbar flexion. Pt has continued difficulty with heel raises d/t weakness. Encouraged continued compliance with HEP; pt voiced understanding. No adverse effects reported p tx.     Raffy is progressing well towards his goals.   Pt prognosis is Fair.     Pt will continue to benefit from skilled outpatient physical therapy to address the deficits listed in the problem list box on initial evaluation, provide pt/family education and to maximize pt's level of independence in the home and community environment.     Pt's spiritual, cultural and educational needs considered and pt agreeable to plan of care and goals.    Anticipated barriers to physical therapy: chronicity of pain    Goals:  Short Term Goals: 6 weeks   1. Patient will be independent in HEP & progressions  2. Patient will improve single leg balance to 5 secons on ea limb.     Long Term Goals: 12 weeks   1. Patient will have FOTO limitation score of 55% to demonstrate improved overall function & decreased pain  2. Patient will report reduced pain to 0/10 at rest to increase ability to complete work related duties.  3. Patient will increase R hip/knee MMT to 4/5 to demonstrate improved strength  4. Patient will increase R ankle df to 3+/5 to demonstrate improved strength & decreased gait deviations    Plan   Plan of care Certification: 6/11/2019 to 12/31/2020.     Outpatient aquatic Physical Therapy 1-2 times weekly for  12 weeks to include the  following interventions: manual therapy, aquatic therapy, patient education, therapeutic exercise, therapeutic activities  Patient may be seen by PTA as part of rehabilitation team       Kelsie Rodriguez PTA

## 2020-07-22 ENCOUNTER — OFFICE VISIT (OUTPATIENT)
Dept: NEUROSURGERY | Facility: CLINIC | Age: 70
End: 2020-07-22
Payer: COMMERCIAL

## 2020-07-22 ENCOUNTER — HOSPITAL ENCOUNTER (OUTPATIENT)
Dept: RADIOLOGY | Facility: HOSPITAL | Age: 70
Discharge: HOME | End: 2020-07-22
Attending: NEUROLOGICAL SURGERY
Payer: COMMERCIAL

## 2020-07-22 VITALS
HEIGHT: 68 IN | BODY MASS INDEX: 27.13 KG/M2 | DIASTOLIC BLOOD PRESSURE: 88 MMHG | SYSTOLIC BLOOD PRESSURE: 154 MMHG | WEIGHT: 179 LBS | HEART RATE: 72 BPM

## 2020-07-22 DIAGNOSIS — M48.07 FORAMINAL STENOSIS OF LUMBOSACRAL REGION: ICD-10-CM

## 2020-07-22 DIAGNOSIS — M53.2X7 SPINAL INSTABILITY OF LUMBOSACRAL REGION: Primary | ICD-10-CM

## 2020-07-22 DIAGNOSIS — R53.1 PROGRESSIVE FOCAL MOTOR WEAKNESS: ICD-10-CM

## 2020-07-22 DIAGNOSIS — M51.36 DEGENERATIVE DISC DISEASE, LUMBAR: ICD-10-CM

## 2020-07-22 DIAGNOSIS — M47.817 SPONDYLOSIS WITHOUT MYELOPATHY OR RADICULOPATHY, LUMBOSACRAL REGION: ICD-10-CM

## 2020-07-22 DIAGNOSIS — Z98.1 STATUS POST LUMBAR SPINAL FUSION: ICD-10-CM

## 2020-07-22 PROCEDURE — 73560 XR KNEE 1 OR 2 VIEW LEFT: ICD-10-PCS | Mod: 26,LT,, | Performed by: RADIOLOGY

## 2020-07-22 PROCEDURE — 1125F PR PAIN SEVERITY QUANTIFIED, PAIN PRESENT: ICD-10-PCS | Mod: S$GLB,,, | Performed by: NEUROLOGICAL SURGERY

## 2020-07-22 PROCEDURE — 73560 X-RAY EXAM OF KNEE 1 OR 2: CPT | Mod: 26,LT,, | Performed by: RADIOLOGY

## 2020-07-22 PROCEDURE — 72120 X-RAY BEND ONLY L-S SPINE: CPT | Mod: 26,,, | Performed by: RADIOLOGY

## 2020-07-22 PROCEDURE — 72120 XR LUMBAR SPINE FLEXION AND EXTENSION ONLY: ICD-10-PCS | Mod: 26,,, | Performed by: RADIOLOGY

## 2020-07-22 PROCEDURE — A9585 GADOBUTROL INJECTION: HCPCS | Performed by: NEUROLOGICAL SURGERY

## 2020-07-22 PROCEDURE — 25500020 PHARM REV CODE 255: Performed by: NEUROLOGICAL SURGERY

## 2020-07-22 PROCEDURE — 1101F PT FALLS ASSESS-DOCD LE1/YR: CPT | Mod: CPTII,S$GLB,, | Performed by: NEUROLOGICAL SURGERY

## 2020-07-22 PROCEDURE — 3008F BODY MASS INDEX DOCD: CPT | Mod: CPTII,S$GLB,, | Performed by: NEUROLOGICAL SURGERY

## 2020-07-22 PROCEDURE — 72082 X-RAY EXAM ENTIRE SPI 2/3 VW: CPT | Mod: 26,,, | Performed by: RADIOLOGY

## 2020-07-22 PROCEDURE — 99999 PR PBB SHADOW E&M-EST. PATIENT-LVL IV: CPT | Mod: PBBFAC,,, | Performed by: NEUROLOGICAL SURGERY

## 2020-07-22 PROCEDURE — 1101F PR PT FALLS ASSESS DOC 0-1 FALLS W/OUT INJ PAST YR: ICD-10-PCS | Mod: CPTII,S$GLB,, | Performed by: NEUROLOGICAL SURGERY

## 2020-07-22 PROCEDURE — 72158 MRI LUMBAR SPINE W/O & W/DYE: CPT | Mod: 26,,, | Performed by: RADIOLOGY

## 2020-07-22 PROCEDURE — 72158 MRI LUMBAR SPINE W WO CONTRAST: ICD-10-PCS | Mod: 26,,, | Performed by: RADIOLOGY

## 2020-07-22 PROCEDURE — 1125F AMNT PAIN NOTED PAIN PRSNT: CPT | Mod: S$GLB,,, | Performed by: NEUROLOGICAL SURGERY

## 2020-07-22 PROCEDURE — 99999 PR PBB SHADOW E&M-EST. PATIENT-LVL IV: ICD-10-PCS | Mod: PBBFAC,,, | Performed by: NEUROLOGICAL SURGERY

## 2020-07-22 PROCEDURE — 99213 PR OFFICE/OUTPT VISIT, EST, LEVL III, 20-29 MIN: ICD-10-PCS | Mod: S$GLB,,, | Performed by: NEUROLOGICAL SURGERY

## 2020-07-22 PROCEDURE — 1159F MED LIST DOCD IN RCRD: CPT | Mod: S$GLB,,, | Performed by: NEUROLOGICAL SURGERY

## 2020-07-22 PROCEDURE — 1159F PR MEDICATION LIST DOCUMENTED IN MEDICAL RECORD: ICD-10-PCS | Mod: S$GLB,,, | Performed by: NEUROLOGICAL SURGERY

## 2020-07-22 PROCEDURE — 72082 XR SCOLIOSIS COMPLETE: ICD-10-PCS | Mod: 26,,, | Performed by: RADIOLOGY

## 2020-07-22 PROCEDURE — 73560 X-RAY EXAM OF KNEE 1 OR 2: CPT | Mod: TC,PN,LT

## 2020-07-22 PROCEDURE — 3008F PR BODY MASS INDEX (BMI) DOCUMENTED: ICD-10-PCS | Mod: CPTII,S$GLB,, | Performed by: NEUROLOGICAL SURGERY

## 2020-07-22 PROCEDURE — 99213 OFFICE O/P EST LOW 20 MIN: CPT | Mod: S$GLB,,, | Performed by: NEUROLOGICAL SURGERY

## 2020-07-22 PROCEDURE — 72158 MRI LUMBAR SPINE W/O & W/DYE: CPT | Mod: TC

## 2020-07-22 PROCEDURE — 72120 X-RAY BEND ONLY L-S SPINE: CPT | Mod: TC,PN

## 2020-07-22 PROCEDURE — 72082 X-RAY EXAM ENTIRE SPI 2/3 VW: CPT | Mod: TC,PN

## 2020-07-22 RX ORDER — GADOBUTROL 604.72 MG/ML
10 INJECTION INTRAVENOUS
Status: COMPLETED | OUTPATIENT
Start: 2020-07-22 | End: 2020-07-22

## 2020-07-22 RX ADMIN — GADOBUTROL 8 ML: 604.72 INJECTION INTRAVENOUS at 02:07

## 2020-07-22 NOTE — PROGRESS NOTES
NEUROSURGICAL PROGRESS NOTE    DATE OF SERVICE:  07/22/2020    ATTENDING PHYSICIAN:  Jason Caldwell MD    SUBJECTIVE:    INTERIM HISTORY:    This is a very pleasant 70 y.o. male, who is status post L3-5 direct lateral interbody fusion.  He then developed worsening low back pain, bilateral leg pain and bilateral foot drop.  In October 2019 he was diagnosed with a new L5-S1 disc herniation and was treated with a microdiskectomy.  After the surgery his right leg pain improved.  However over the last 6 months despite doing physical therapy he has been complaining of worsening low back pain, bilateral leg pain and difficulty walking.  He is wearing AFO brace bilaterally.  His condition is affecting his quality of life and functional status.  He has had many spinal injection and SI joint injections in the past with only partial pain relief.      Low Back Pain Scale  R Low Back-Pain Score: 7  R Low Back-Pain Intensity: Pain killers give moderate relief from pain  Personal Care : It is painful to look after myself and I am slow and careful.  Lifting: I can only lift very light weights.  Walking: Pain prevents me walking more than 1/4 mile.  Sitting: Pain prevents me from sitting more than one hour.   Low Back-Standing: I cannot stand for longer than 30 mins without increasing pain   Low Back-Sleeping: Because of pain my normal nights sleep is reduced by less than one half  Social Life: Pain has restricted my social life, and I do not go out as often.   Low Back-Traveling: I have extra pain while traveling but it does not compel me to seek alternate forms of travel   Low Back-Changing Degree of Pain: My pain seems to be getting better but improvement is slow         PAST MEDICAL HISTORY:  Active Ambulatory Problems     Diagnosis Date Noted    Depression     Hyperlipidemia     Chronic pain     Colon polyp     Adenomatous polyp     History of prostate biopsy     GERD (gastroesophageal reflux disease)     Back pain      Sleep apnea     Visual disturbances 10/03/2012    Spondylosis without myelopathy 11/09/2012    Degeneration of lumbar or lumbosacral intervertebral disc 11/09/2012    Spinal stenosis, lumbar region, without neurogenic claudication 11/09/2012    Thoracic or lumbosacral neuritis or radiculitis, unspecified 11/09/2012    Displacement of lumbar intervertebral disc without myelopathy 11/09/2012    Acquired spondylolisthesis 11/09/2012    Lumbago 11/09/2012    Status post cataract extraction and insertion of intraocular lens - Both Eyes 11/13/2012    Prostatitis, acute 12/17/2012    Fibromyalgia 10/21/2013    OP (osteoporosis) 10/21/2013    Compression fracture of T12 vertebra 10/21/2013    Diverticulitis large intestine 12/21/2013    Osteoarthritis 03/19/2014    Lower back pain 04/01/2014    Lower extremity pain 04/01/2014    Muscle weakness 04/01/2014    Range of motion deficit 04/01/2014    Special screening for malignant neoplasms, colon 05/05/2014    Cervicalgia 06/19/2014    Facet joint disease of cervical region 06/19/2014    Occipital neuralgia 06/19/2014    Chronic migraine without aura, with intractable migraine, so stated, with status migrainosus 06/19/2014    Cervical radiculopathy 06/19/2014    Paroxysmal hemicrania 06/19/2014    Sciatic nerve pain 06/19/2014    Chronic LBP 06/27/2014    DJD (degenerative joint disease) of lumbar spine 06/27/2014    Chronic neck pain 06/27/2014    Right lumbar radiculopathy 06/27/2014    Thyroid nodule 07/16/2014    Carpal tunnel syndrome of right wrist 07/16/2014    Paresthesia 08/01/2014    Brow ptosis 02/10/2015    Degenerative disc disease 06/22/2015    Encounter for postoperative wound check 08/01/2015    Lumbar radiculopathy 09/15/2015    Cervical spondylosis 10/22/2015    S/P lumbar spinal fusion 12/02/2015    Right wrist tendinitis 07/28/2017    Pain in right wrist 07/28/2017    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 11/10/2017    Headache around the eyes 06/26/2018    Closed fracture of left distal radius 02/05/2019    Pain in left wrist 03/19/2019    Bilateral foot-drop 07/16/2019    Idiopathic peripheral neuropathy 07/16/2019    Sacroiliac joint dysfunction of right side 07/16/2019    SI (sacroiliac) joint dysfunction 09/19/2019    Episodic migraine without status migrainosus, not intractable 10/25/2019    Lumbar disc herniation with radiculopathy 10/25/2019    Anxiety about health 11/12/2019    MDD (major depressive disorder), recurrent episode, moderate 11/12/2019    Anxiety 11/14/2019    Weakness of both lower extremities 11/20/2019    History of colon polyps 12/17/2019    Iron deficiency anemia 05/15/2020    Sicca syndrome with keratoconjunctivitis 05/27/2020     Resolved Ambulatory Problems     Diagnosis Date Noted    Blepharitis of both eyes - Both Eyes 11/13/2012    Low back pain without sciatica 10/27/2015    Lumbar radicular pain 10/27/2015    Gait abnormality 02/21/2017    Impaired functional mobility, balance, gait, and endurance 02/24/2017    Left hip pain 05/24/2017    Difficulty walking 11/01/2017    Weakness 11/01/2017    Lumbar back pain with radiculopathy affecting lower extremity 11/15/2018     Past Medical History:   Diagnosis Date    Allergy     Arthritis     Cataract     Cluster headache 2013    Diverticulitis 12/2013    Elevated PSA     Hepatitis 1970's    Joint pain     Visual disturbance 2012       PAST SURGICAL HISTORY:  Past Surgical History:   Procedure Laterality Date    BACK SURGERY      CATARACT EXTRACTION W/  INTRAOCULAR LENS IMPLANT Bilateral     CHOLECYSTECTOMY      COLONOSCOPY N/A 12/17/2019    Procedure: COLONOSCOPY;  Surgeon: Amadou Hardin MD;  Location: [facility] ([address] [place] [address]);  Service: Endoscopy;  Laterality: N/A;  pt request to do Miralax bowel prep-BB    COSMETIC SURGERY  2/10/2015    Direct mid-forehead brow lift    COSMETIC SURGERY   2/10/2015    Bilateral upper lid blepahroplasty    ESOPHAGOGASTRODUODENOSCOPY N/A 12/17/2019    Procedure: ESOPHAGOGASTRODUODENOSCOPY (EGD);  Surgeon: Amadou Hardin MD;  Location: [facility] ([address] [place] [address]);  Service: Endoscopy;  Laterality: N/A;    EYE SURGERY      HEMORRHOID SURGERY      with complication of chronic bleeding for 6 weeks     INJECTION OF STEROID Right 12/6/2018    Procedure: INJECTION, STEROID Right SI Joint Block and Steroid Injection;  Surgeon: Jason Caldwell MD;  Location: [facility];  Service: Neurosurgery;  Laterality: Right;  Procedure: Right SI Joint Block and Steroid Injection  Surgery Time: 30 MIN  LOS: 0  Anesthesia: MAC  Radiology: C-arm  Bed: [place] 4 Poster  Position: Prone    INJECTION OF STEROID Right 9/19/2019    Procedure: INJECTION, STEROID Procedure: Right SI joint block nd steroid injection;  Surgeon: Jason Caldwell MD;  Location: [facility];  Service: Neurosurgery;  Laterality: Right;  Procedure: Right SI joint block nd steroid injection  Surgery Time: 30 mins  LOS:   Anesthesia: General MAC  Radiology:C-arm  Bed: Regular Bed  Position: Prone    JOINT REPLACEMENT      KNEE SURGERY      involving arthroscopic surgery to both knees    SINUS SURGERY      SINUS SURGERY      left molar and sinus surgery for trigeminal neuralgia    SPINAL FUSION  6/22/2015    L3-L5 XLIF    SPINE SURGERY  6/22/15    XLIF/TANA    TOTAL HIP ARTHROPLASTY  4/2012    Pt states he had total hip replacement on his left hip.       SOCIAL HISTORY:   Social History     Socioeconomic History    Marital status:      Spouse name: Not on file    Number of children: Not on file    Years of education: Not on file    Highest education level: Not on file   Occupational History    Occupation: Psychotherpist    Social Needs    Financial resource strain: Not hard at all    Food insecurity     Worry: Never true     Inability: Never true    Transportation needs     Medical: No      Non-medical: No   Tobacco Use    Smoking status: Never Smoker    Smokeless tobacco: Never Used   Substance and Sexual Activity    Alcohol use: Yes     Frequency: 4 or more times a week     Drinks per session: 1 or 2     Binge frequency: Never     Comment: occasion    Drug use: No    Sexual activity: Yes     Partners: Female   Lifestyle    Physical activity     Days per week: 0 days     Minutes per session: 0 min    Stress: To some extent   Relationships    Social connections     Talks on phone: More than three times a week     Gets together: Twice a week     Attends Sabianist service: Patient refused     Active member of club or organization: Patient refused     Attends meetings of clubs or organizations: Patient refused     Relationship status:    Other Topics Concern    Not on file   Social History Narrative    Not on file       FAMILY HISTORY:  Family History   Problem Relation Age of Onset    Cancer Mother         colon; uterine    Nephrolithiasis Mother     Stroke Mother 86    Pancreatitis Brother     Vision loss Brother         macular degeneration    Diabetes Brother     Macular degeneration Brother     Migraines Sister     No Known Problems Father     No Known Problems Maternal Grandmother     No Known Problems Maternal Grandfather     No Known Problems Paternal Grandmother     No Known Problems Paternal Grandfather     No Known Problems Sister     No Known Problems Maternal Aunt     No Known Problems Maternal Uncle     No Known Problems Paternal Aunt     No Known Problems Paternal Uncle     Melanoma Neg Hx     Amblyopia Neg Hx     Blindness Neg Hx     Cataracts Neg Hx     Glaucoma Neg Hx     Hypertension Neg Hx     Retinal detachment Neg Hx     Strabismus Neg Hx     Thyroid disease Neg Hx     Allergic rhinitis Neg Hx     Allergies Neg Hx     Angioedema Neg Hx     Asthma Neg Hx     Eczema Neg Hx     Urticaria Neg Hx     Rhinitis Neg Hx     Immunodeficiency  Neg Hx     Atopy Neg Hx        CURRENTS MEDICATIONS:  Current Outpatient Medications on File Prior to Visit   Medication Sig Dispense Refill    butalbital-acetaminophen-caffeine -40 mg (FIORICET, ESGIC) -40 mg per tablet Take 1 tablet by mouth 6 hours as needed for Headaches. 30 tablet 0    clonazePAM (KLONOPIN) 0.5 MG tablet Take two tablets by mouth at bedtime and then one tablet by mouth as needed for tinnitus. 75 tablet 5    cyanocobalamin 1,000 mcg/mL injection       diclofenac sodium (VOLTAREN) 1 % Gel Apply 2 g topically once daily. 100 g 0    famotidine (PEPCID) 20 MG tablet TAKE 1 TABLET BY MOUTH TWICE A DAY 60 tablet 12    HYDROcodone-acetaminophen (NORCO) 5-325 mg per tablet Take 1 tablet by mouth every 12 (twelve) hours as needed for Pain (moderate to severe). 60 tablet 0    lamoTRIgine (LAMICTAL) 200 MG tablet Take 1 tablet (200 mg total) by mouth once daily. 90 tablet 3    methocarbamoL (ROBAXIN) 750 MG Tab Take 1 tablet (750 mg total) by mouth 3 (three) times daily as needed. 90 tablet 11    pravastatin (PRAVACHOL) 40 MG tablet Take 1 tablet (40 mg total) by mouth once daily. 90 tablet 3    pregabalin (LYRICA) 50 MG capsule Take 1 capsule (50 mg total) by mouth 2 (two) times daily. 60 capsule 5    RABEprazole (ACIPHEX) 20 mg tablet Take 1 tablet (20 mg total) by mouth 2 (two) times daily. 180 tablet 3    selegiline (EMSAM) 12 mg/24 hr Place 1 new patch onto the skin once daily. 90 patch 3    senna-docusate 8.6-50 mg (PERICOLACE) 8.6-50 mg per tablet Take 2 tablets by mouth nightly as needed for Constipation.      sucralfate (CARAFATE) 1 gram tablet TAKE 1 TABLET BY MOUTH 4 TIMES A  tablet 4    tadalafil (CIALIS) 5 MG tablet Take 1 tablet (5 mg total) by mouth once daily. 30 tablet 12    traZODone (DESYREL) 100 MG tablet Take 1 tablet (100 mg total) by mouth every evening. 90 tablet 3    ubrogepant (UBRELVY)tablet 50 mg Take 1 tablet (50 mg total) by mouth as needed  for Migraine. (Patient taking differently: Take 50 mg by mouth once as needed for Migraine. ) 10 tablet 1     No current facility-administered medications on file prior to visit.        ALLERGIES:  Review of patient's allergies indicates:   Allergen Reactions    Alphagan [brimonidine]      Patient taking MASO-B Selective Inhibitor Selegiline (Emsam)    Coumadin [warfarin]      itch    Oxycodone      hiccups    Pennsaid [diclofenac sodium] Rash       REVIEW OF SYSTEMS:  Review of Systems   Constitutional: Negative for diaphoresis, fever and weight loss.   Respiratory: Negative for shortness of breath.    Cardiovascular: Negative for chest pain.   Gastrointestinal: Negative for blood in stool.   Genitourinary: Negative for hematuria.   Endo/Heme/Allergies: Does not bruise/bleed easily.   All other systems reviewed and are negative.        OBJECTIVE:    PHYSICAL EXAMINATION:   Vitals:    07/22/20 1520   BP: (!) 154/88   Pulse: 72       Physical Exam:  Vitals reviewed.    Constitutional: He appears well-developed and well-nourished.     Eyes: Pupils are equal, round, and reactive to light. Conjunctivae and EOM are normal.     Cardiovascular: Normal distal pulses and no edema.     Abdominal: Soft.     Skin: Skin displays no rash on trunk and no rash on extremities. Skin displays no lesions on trunk and no lesions on extremities.     Psych/Behavior: He is alert. He is oriented to person, place, and time. He has a normal mood and affect.     Musculoskeletal:        Neck: Range of motion is full.     Neurological:        DTRs: Tricep reflexes are 2+ on the right side and 2+ on the left side. Bicep reflexes are 2+ on the right side and 2+ on the left side. Brachioradialis reflexes are 2+ on the right side and 2+ on the left side. Patellar reflexes are 2+ on the right side and 2+ on the left side. Achilles reflexes are 2+ on the right side and 2+ on the left side.       Back Exam     Tenderness   The patient is  experiencing tenderness in the lumbar.    Range of Motion   Extension: abnormal   Flexion: abnormal   Lateral bend right: abnormal   Lateral bend left: abnormal   Rotation right: abnormal   Rotation left: abnormal     Muscle Strength   Right Quadriceps:  5/5   Left Quadriceps:  5/5   Right Hamstrings:  5/5   Left Hamstrings:  5/5     Tests   Straight leg raise right: negative  Straight leg raise left: negative    Other   Toe walk: normal  Heel walk: normal            SI joint:   Palpation at the right and left SI joints not painful  ABDIEL test is negative bilaterally  Gaenslen test is negative bilaterally  Thigh thrust test is negative bilaterally    Neurologic Exam     Mental Status   Oriented to person, place, and time.   Speech: speech is normal   Level of consciousness: alert    Cranial Nerves   Cranial nerves II through XII intact.     CN III, IV, VI   Pupils are equal, round, and reactive to light.  Extraocular motions are normal.     Motor Exam   Muscle bulk: normal  Overall muscle tone: normal    Strength   Right deltoid: 5/5  Left deltoid: 5/5  Right biceps: 5/5  Left biceps: 5/5  Right triceps: 5/5  Left triceps: 5/5  Right wrist flexion: 5/5  Left wrist flexion: 5/5  Right wrist extension: 5/5  Left wrist extension: 5/5  Right interossei: 5/5  Left interossei: 5/5  Right iliopsoas: 5/5  Left iliopsoas: 5/5  Right quadriceps: 5/5  Left quadriceps: 5/5  Right hamstrin/5  Left hamstrin/5  Right anterior tibial: 4/5  Left anterior tibial: 4/5  Right posterior tibial: 5/5  Left posterior tibial: 5/5  Right peroneal: 4/5  Left peroneal: 4/5  Right gastroc: 5/5  Left gastroc: 5/5    Sensory Exam   Light touch normal.   Pinprick normal.     Gait, Coordination, and Reflexes     Gait  Gait: (Antalgic)    Coordination   Finger to nose coordination: normal    Reflexes   Right brachioradialis: 2+  Left brachioradialis: 2+  Right biceps: 2+  Left biceps: 2+  Right triceps: 2+  Left triceps: 2+  Right patellar:  2+  Left patellar: 2+  Right achilles: 2+  Left achilles: 2+  Right plantar: normal  Left plantar: normal  Right De Leon: absent  Left De Leon: absent  Right ankle clonus: absent  Left ankle clonus: absent        DIAGNOSTIC DATA:  I personally interpreted the following imaging:   Today lumbar flexion-extension x-rays shows L5-S1 degenerative spondylolisthesis grade 1 with 3 mm of movement in flexion-extension  Lumbar spine MRI July 2020 shows severe degenerative disc disease at L5-S1 with spondylolisthesis and severe bilateral foraminal stenosis    ASSESMENT:  This is a 70 y.o. male with     Problem List Items Addressed This Visit     None      Visit Diagnoses     Spinal instability of lumbosacral region    -  Primary    Degenerative disc disease, lumbar        Foraminal stenosis of lumbosacral region        Status post lumbar spinal fusion        Progressive focal motor weakness                PLAN:  I explained the natural history of the disease and all treatment options. I recommended a left L5-S1 anterior interbody fusion to treat his L5-S1 instability and to indirectly decompress his bilateral L5 foraminal stenosis and radiculopathy.  His L5 radiculopathy is worsening as well as his bilateral dorsiflexion weakness     We have discussed the risks of surgery including bleeding, infection, failure of surgery, CSF leak, nerve root injury, spinal cord injury, ureter injury, weakness, paralysis, peripheral neuropathy, malplaced hardware, migration of hardware, non-union, need for reoperation. Patient understands the risks and would like to proceed with surgery.    Ureteral stent placed by Urology under the of the surgery to protect the left ureter during the approach    All questions answered          Jason Caldwell MD  Cell:[phone]

## 2020-07-23 ENCOUNTER — TELEPHONE (OUTPATIENT)
Dept: NEUROSURGERY | Facility: CLINIC | Age: 70
End: 2020-07-23

## 2020-07-23 DIAGNOSIS — M43.27 FUSION OF SPINE, LUMBOSACRAL REGION: ICD-10-CM

## 2020-07-23 DIAGNOSIS — M48.07 FORAMINAL STENOSIS OF LUMBOSACRAL REGION: ICD-10-CM

## 2020-07-23 DIAGNOSIS — M43.17 SPONDYLOLISTHESIS AT L5-S1 LEVEL: Primary | ICD-10-CM

## 2020-07-23 DIAGNOSIS — M51.36 DDD (DEGENERATIVE DISC DISEASE), LUMBAR: ICD-10-CM

## 2020-07-23 DIAGNOSIS — Z41.9 SURGERY, ELECTIVE: ICD-10-CM

## 2020-07-29 ENCOUNTER — OFFICE VISIT (OUTPATIENT)
Dept: ORTHOPEDICS | Facility: CLINIC | Age: 70
End: 2020-07-29
Payer: COMMERCIAL

## 2020-07-29 DIAGNOSIS — M25.562 ACUTE PAIN OF LEFT KNEE: Primary | ICD-10-CM

## 2020-07-29 PROCEDURE — 1125F AMNT PAIN NOTED PAIN PRSNT: CPT | Mod: S$GLB,,, | Performed by: NURSE PRACTITIONER

## 2020-07-29 PROCEDURE — 99999 PR PBB SHADOW E&M-EST. PATIENT-LVL III: CPT | Mod: PBBFAC,,, | Performed by: NURSE PRACTITIONER

## 2020-07-29 PROCEDURE — 20610 DRAIN/INJ JOINT/BURSA W/O US: CPT | Mod: LT,S$GLB,, | Performed by: NURSE PRACTITIONER

## 2020-07-29 PROCEDURE — 20610 PR DRAIN/INJECT LARGE JOINT/BURSA: ICD-10-PCS | Mod: LT,S$GLB,, | Performed by: NURSE PRACTITIONER

## 2020-07-29 PROCEDURE — 1159F MED LIST DOCD IN RCRD: CPT | Mod: S$GLB,,, | Performed by: NURSE PRACTITIONER

## 2020-07-29 PROCEDURE — 1159F PR MEDICATION LIST DOCUMENTED IN MEDICAL RECORD: ICD-10-PCS | Mod: S$GLB,,, | Performed by: NURSE PRACTITIONER

## 2020-07-29 PROCEDURE — 99213 PR OFFICE/OUTPT VISIT, EST, LEVL III, 20-29 MIN: ICD-10-PCS | Mod: 25,S$GLB,, | Performed by: NURSE PRACTITIONER

## 2020-07-29 PROCEDURE — 99999 PR PBB SHADOW E&M-EST. PATIENT-LVL III: ICD-10-PCS | Mod: PBBFAC,,, | Performed by: NURSE PRACTITIONER

## 2020-07-29 PROCEDURE — 3288F FALL RISK ASSESSMENT DOCD: CPT | Mod: CPTII,S$GLB,, | Performed by: NURSE PRACTITIONER

## 2020-07-29 PROCEDURE — 1100F PTFALLS ASSESS-DOCD GE2>/YR: CPT | Mod: CPTII,S$GLB,, | Performed by: NURSE PRACTITIONER

## 2020-07-29 PROCEDURE — 1100F PR PT FALLS ASSESS DOC 2+ FALLS/FALL W/INJURY/YR: ICD-10-PCS | Mod: CPTII,S$GLB,, | Performed by: NURSE PRACTITIONER

## 2020-07-29 PROCEDURE — 99213 OFFICE O/P EST LOW 20 MIN: CPT | Mod: 25,S$GLB,, | Performed by: NURSE PRACTITIONER

## 2020-07-29 PROCEDURE — 1125F PR PAIN SEVERITY QUANTIFIED, PAIN PRESENT: ICD-10-PCS | Mod: S$GLB,,, | Performed by: NURSE PRACTITIONER

## 2020-07-29 PROCEDURE — 3288F PR FALLS RISK ASSESSMENT DOCUMENTED: ICD-10-PCS | Mod: CPTII,S$GLB,, | Performed by: NURSE PRACTITIONER

## 2020-07-29 RX ADMIN — TRIAMCINOLONE ACETONIDE 40 MG: 40 INJECTION, SUSPENSION INTRA-ARTICULAR; INTRAMUSCULAR at 10:07

## 2020-07-29 NOTE — LETTER
July 30, 2020      Nini Rendon MD  [address]  [facility] [address]           [facility]  [address]  [facility] [address]  Phone: [phone]          Patient: Raffy Rutherford Jr.   MR Number: [id number]   YOB: 1950   Date of Visit: 7/29/2020       Dear Dr. Nini Rendon:    Thank you for referring Raffy Rutherford to me for evaluation. Attached you will find relevant portions of my assessment and plan of care.    If you have questions, please do not hesitate to call me. I look forward to following Raffy Rutherford along with you.    Sincerely,    Lisandra Trinh, NP    Enclosure  CC:  No Recipients    If you would like to receive this communication electronically, please contact [email or web address] or [phone] to request more information on [employer] access.    For providers and/or their staff who would like to refer a patient to [facility], please contact us through our one-stop-shop provider referral line, [facility], at [phone].    If you feel you have received this communication in error or would no longer like to receive these types of communications, please e-mail [email or web address]

## 2020-07-29 NOTE — PROGRESS NOTES
CC: Pain of the Left Knee      HPI: Pt with c/o left knee pain for the past few weeks. The pain is aching and medial. He had an xray which was negative for arthritis, but the pain has persisted. He is having a lot of problems with his back and has a surgery coming up. He's not sure if the pain is related to his back. He would like to try a cortisone injection to determine if the pain is related to his back or his knee. He is ambulating without assistive device. There is a limp which he relates to his back problems.    ROS  General: denies fever and chills  Resp: no c/o sob  CVS: no c/o cp  MSK: c/o left knee pain which is aching and medial    PE  General: AAOx3, pleasant and cooperative  Resp: respirations even and unlabored  MSK: left knee exam  0 degrees extension  120 degrees flexion  No warmth or erythema   - effusion  + tenderness over the medial joint line  Mild crepitus    Xray:  Reviewed by me: No significant joint space narrowing is identified.  There is no evidence of chondrocalcinosis.  No significant osteophytosis is identified.  No joint effusion is present.  The soft tissues are unremarkable    Assessment:  Left knee pain    Plan:  Cortisone injection left knee today  RICE  Tylenol or nsaids prn    Knee Injection Procedure Note  Diagnosis: left knee degenerative arthritis  Indications: left knee pain  Procedure Details: Verbal consent was obtained for the procedure. The injection site was identified and the skin was prepared with alcohol. The left knee was injected from an anterolateral approach with 1 ml of Kenalog and 4 ml Lidocaine under sterile technique using a 22 gauge needle. The needle was removed and the area cleansed and dressed.  Complications:  Patient tolerated the procedure well.    he was advised to rest the knee today, using ice and elevation as needed for comfort and swelling.Immediate relief of the knee pain may be short lived and secondary to the lidocaine. he may have an increase in  discomfort tonight followed by steady improvement over the next several days. It may take 1-2 weeks following the injection to get the full benefit of the medication.

## 2020-07-30 ENCOUNTER — ANESTHESIA EVENT (OUTPATIENT)
Dept: SURGERY | Facility: HOSPITAL | Age: 70
DRG: 460 | End: 2020-07-30
Payer: COMMERCIAL

## 2020-07-30 ENCOUNTER — CLINICAL SUPPORT (OUTPATIENT)
Dept: REHABILITATION | Facility: HOSPITAL | Age: 70
End: 2020-07-30
Attending: NEUROLOGICAL SURGERY
Payer: COMMERCIAL

## 2020-07-30 DIAGNOSIS — R29.898 WEAKNESS OF BOTH LOWER EXTREMITIES: ICD-10-CM

## 2020-07-30 PROCEDURE — 97113 AQUATIC THERAPY/EXERCISES: CPT | Mod: CQ

## 2020-07-30 RX ORDER — TRIAMCINOLONE ACETONIDE 40 MG/ML
40 INJECTION, SUSPENSION INTRA-ARTICULAR; INTRAMUSCULAR
Status: COMPLETED | OUTPATIENT
Start: 2020-07-29 | End: 2020-07-29

## 2020-07-30 NOTE — PROGRESS NOTES
"  Physical Therapy Daily Treatment Note     Name: Raffy Rutherford Jr.  Clinic Number: [id number]    Therapy Diagnosis:   Encounter Diagnosis   Name Primary?    Weakness of both lower extremities      Physician: Nini Rendon MD    Visit Date: 7/30/2020  Physician Orders: PT Eval and Treat pool therapy  Medical Diagnosis from Referral:Z98.890 (ICD-10-CM) - S/P lumbar microdiscectomy  Evaluation Date: 11/20/2019  Authorization Period Expiration: 12/31/2020  Plan of Care Expiration: 12/31/2020  Visit # / Visits authorized: 3/20     Time In: 1505  Time Out: 1545  Total Billable Time: 30 minutes     Precautions: fibromyalgia  Procedures: Right MIS approach to the L5-S1  level  2.  L5-S1 laminotomy and microdiscectomy on 10/25/2019    Subjective     Pt reports: Pt returns to therapy this pm with c/o continued LBP with radicular symptoms down RLE. He got an injection in L knee yesterday which helped the pain.    He was compliant with home exercise program.  Response to previous treatment: NA  Functional change: NA    Pain: 6/10  Location: Lumbar spine into RLE    Objective     Raffy received aquatic therapeutic exercises to develop strength, endurance, ROM, flexibility, posture, core stabilization and balance for 53 minutes including:    WARMUP x 1 lap each  Walk fwd/bwd/lat  Toe walking fwd/bwd/lat    STRETCHES 3 x 20" moiz  HS  Quads    LE EX x 20 s UE support  Heel raise  Hip abd/add c cuffs  Hip flx/ext c cuffs  Mini squat   Leg press c blk noodle - np  Lateral steps c blue tb x 2 laps  Lateral monster walks c blue tb x 2 laps  Lunges x 2 laps    CORE x 20  Mini squat c push/pull  Shoulder flx/ext c closed paddles - np  Shoulder hor abd/add c closed paddles - np    ENDURANCE  Bicycle x 5'    COOL DOWN x 1 lap each  Toe walking fwd/bwd/lat    Home Exercises Provided and Patient Education Provided     Education provided:   - Home walking program for cardiorespiratory endurance    Written Home Exercises Provided: Patient " instructed to cont prior HEP.  Exercises were reviewed and Raffy was able to demonstrate them prior to the end of the session.  Raffy demonstrated good  understanding of the education provided.     See EMR under Patient Instructions for exercises provided prior visit.    Assessment     Pt was able to complete all exercises with no c/o increased LBP or radicular symptoms. Pt was able to complete most balance exercises with single UE support; continued LOB during lunges and exercises c cuffs. Pt demonstrated continued poor lunge form and required v/c to avoid excessive lumbar flexion and knee flexion of trailing LE. Pt has continued difficulty with heel raises d/t weakness. Encouraged continued compliance with HEP; pt voiced understanding. No adverse effects reported p tx.     Raffy is progressing well towards his goals.   Pt prognosis is Fair.     Pt will continue to benefit from skilled outpatient physical therapy to address the deficits listed in the problem list box on initial evaluation, provide pt/family education and to maximize pt's level of independence in the home and community environment.     Pt's spiritual, cultural and educational needs considered and pt agreeable to plan of care and goals.    Anticipated barriers to physical therapy: chronicity of pain    Goals:  Short Term Goals: 6 weeks   1. Patient will be independent in HEP & progressions  2. Patient will improve single leg balance to 5 secons on ea limb.     Long Term Goals: 12 weeks   1. Patient will have FOTO limitation score of 55% to demonstrate improved overall function & decreased pain  2. Patient will report reduced pain to 0/10 at rest to increase ability to complete work related duties.  3. Patient will increase R hip/knee MMT to 4/5 to demonstrate improved strength  4. Patient will increase R ankle df to 3+/5 to demonstrate improved strength & decreased gait deviations    Plan   Plan of care Certification: 6/11/2019 to  12/31/2020.     Outpatient aquatic Physical Therapy 1-2 times weekly for  12 weeks to include the following interventions: manual therapy, aquatic therapy, patient education, therapeutic exercise, therapeutic activities  Patient may be seen by PTA as part of rehabilitation team    Kelsie Rodriguez PTA

## 2020-07-31 ENCOUNTER — PATIENT MESSAGE (OUTPATIENT)
Dept: INTERNAL MEDICINE | Facility: CLINIC | Age: 70
End: 2020-07-31

## 2020-07-31 DIAGNOSIS — Z01.818 PREOPERATIVE EXAMINATION: Primary | ICD-10-CM

## 2020-08-01 NOTE — TELEPHONE ENCOUNTER
The patient will need preop labs: orders are in.    Cbc, cmp urinalysis and PT PTT and EKG    Virtual or In person preop exam after all studies are done. If he wants a virtual he should take his blood pressure at least 3 times.

## 2020-08-03 ENCOUNTER — LAB VISIT (OUTPATIENT)
Dept: LAB | Facility: HOSPITAL | Age: 70
End: 2020-08-03
Attending: INTERNAL MEDICINE
Payer: COMMERCIAL

## 2020-08-03 ENCOUNTER — HOSPITAL ENCOUNTER (OUTPATIENT)
Dept: CARDIOLOGY | Facility: CLINIC | Age: 70
Discharge: HOME | End: 2020-08-03
Payer: COMMERCIAL

## 2020-08-03 DIAGNOSIS — Z01.818 PREOPERATIVE EXAMINATION: ICD-10-CM

## 2020-08-03 LAB
ALBUMIN SERPL BCP-MCNC: 4.3 G/DL (ref 3.5–5.2)
ALP SERPL-CCNC: 41 U/L (ref 55–135)
ALT SERPL W/O P-5'-P-CCNC: 30 U/L (ref 10–44)
ANION GAP SERPL CALC-SCNC: 7 MMOL/L (ref 8–16)
APTT BLDCRRT: 29.1 SEC (ref 21–32)
AST SERPL-CCNC: 23 U/L (ref 10–40)
BASOPHILS # BLD AUTO: 0.05 K/UL (ref 0–0.2)
BASOPHILS NFR BLD: 0.9 % (ref 0–1.9)
BILIRUB SERPL-MCNC: 0.5 MG/DL (ref 0.1–1)
BUN SERPL-MCNC: 14 MG/DL (ref 8–23)
CALCIUM SERPL-MCNC: 9.9 MG/DL (ref 8.7–10.5)
CHLORIDE SERPL-SCNC: 104 MMOL/L (ref 95–110)
CO2 SERPL-SCNC: 28 MMOL/L (ref 23–29)
CREAT SERPL-MCNC: 0.8 MG/DL (ref 0.5–1.4)
DIFFERENTIAL METHOD: ABNORMAL
EOSINOPHIL # BLD AUTO: 0.1 K/UL (ref 0–0.5)
EOSINOPHIL NFR BLD: 1.1 % (ref 0–8)
ERYTHROCYTE [DISTWIDTH] IN BLOOD BY AUTOMATED COUNT: 22.6 % (ref 11.5–14.5)
EST. GFR  (AFRICAN AMERICAN): >60 ML/MIN/1.73 M^2
EST. GFR  (NON AFRICAN AMERICAN): >60 ML/MIN/1.73 M^2
GLUCOSE SERPL-MCNC: 94 MG/DL (ref 70–110)
HCT VFR BLD AUTO: 46.8 % (ref 40–54)
HGB BLD-MCNC: 15.1 G/DL (ref 14–18)
IMM GRANULOCYTES # BLD AUTO: 0.02 K/UL (ref 0–0.04)
IMM GRANULOCYTES NFR BLD AUTO: 0.4 % (ref 0–0.5)
INR PPP: 1 (ref 0.8–1.2)
LYMPHOCYTES # BLD AUTO: 2.2 K/UL (ref 1–4.8)
LYMPHOCYTES NFR BLD: 38.8 % (ref 18–48)
MCH RBC QN AUTO: 28.1 PG (ref 27–31)
MCHC RBC AUTO-ENTMCNC: 32.3 G/DL (ref 32–36)
MCV RBC AUTO: 87 FL (ref 82–98)
MONOCYTES # BLD AUTO: 0.5 K/UL (ref 0.3–1)
MONOCYTES NFR BLD: 8.8 % (ref 4–15)
NEUTROPHILS # BLD AUTO: 2.8 K/UL (ref 1.8–7.7)
NEUTROPHILS NFR BLD: 50 % (ref 38–73)
NRBC BLD-RTO: 0 /100 WBC
PLATELET # BLD AUTO: 234 K/UL (ref 150–350)
PMV BLD AUTO: 9.6 FL (ref 9.2–12.9)
POTASSIUM SERPL-SCNC: 4.5 MMOL/L (ref 3.5–5.1)
PROT SERPL-MCNC: 7.6 G/DL (ref 6–8.4)
PROTHROMBIN TIME: 11 SEC (ref 9–12.5)
RBC # BLD AUTO: 5.38 M/UL (ref 4.6–6.2)
SODIUM SERPL-SCNC: 139 MMOL/L (ref 136–145)
WBC # BLD AUTO: 5.59 K/UL (ref 3.9–12.7)

## 2020-08-03 PROCEDURE — 80053 COMPREHEN METABOLIC PANEL: CPT

## 2020-08-03 PROCEDURE — 93005 EKG 12-LEAD: ICD-10-PCS | Mod: S$GLB,,, | Performed by: INTERNAL MEDICINE

## 2020-08-03 PROCEDURE — 93005 ELECTROCARDIOGRAM TRACING: CPT | Mod: S$GLB,,, | Performed by: INTERNAL MEDICINE

## 2020-08-03 PROCEDURE — 93010 ELECTROCARDIOGRAM REPORT: CPT | Mod: S$GLB,,, | Performed by: INTERNAL MEDICINE

## 2020-08-03 PROCEDURE — 85610 PROTHROMBIN TIME: CPT

## 2020-08-03 PROCEDURE — 93010 EKG 12-LEAD: ICD-10-PCS | Mod: S$GLB,,, | Performed by: INTERNAL MEDICINE

## 2020-08-03 PROCEDURE — 85025 COMPLETE CBC W/AUTO DIFF WBC: CPT

## 2020-08-03 PROCEDURE — 85730 THROMBOPLASTIN TIME PARTIAL: CPT

## 2020-08-03 PROCEDURE — 36415 COLL VENOUS BLD VENIPUNCTURE: CPT

## 2020-08-06 ENCOUNTER — CLINICAL SUPPORT (OUTPATIENT)
Dept: REHABILITATION | Facility: HOSPITAL | Age: 70
End: 2020-08-06
Attending: NEUROLOGICAL SURGERY
Payer: COMMERCIAL

## 2020-08-06 DIAGNOSIS — R29.898 WEAKNESS OF BOTH LOWER EXTREMITIES: ICD-10-CM

## 2020-08-06 PROCEDURE — 97112 NEUROMUSCULAR REEDUCATION: CPT

## 2020-08-06 PROCEDURE — 97110 THERAPEUTIC EXERCISES: CPT

## 2020-08-06 NOTE — PROGRESS NOTES
Physical Therapy Daily Treatment Note     Name: Raffy Rutherford Jr.  Clinic Number: [id number]    Therapy Diagnosis:   Encounter Diagnosis   Name Primary?    Weakness of both lower extremities      Physician: Nini Rendon MD    Visit Date: 2020  Physician Orders: PT Eval and Treat pool therapy  Medical Diagnosis from Referral:Z98.890 (ICD-10-CM) - S/P lumbar microdiscectomy  Evaluation Date: 2019  Authorization Period Expiration: 2020  Plan of Care Expiration: 2020  Visit # / Visits authorized: 15/ 20       Time In:  1100  Time Out: 1145  Total Billable Time: 45 minutes     Precautions: fibromyalgia  Procedures: Right MIS approach to the L5-S1  level  2.  L5-S1 laminotomy and microdiscectomy on 10/25/2019    Subjective     Pt reports:He will be having a lumbar surgery on 2020. He has been having pain with any flexion based exercises. He would like to work on some core strengthening leading into his surgery.    He was compliant with home exercise program.  Response to previous treatment: NA  Functional change: NA    Pain: 3/10  Location: Lumbar spine into RLE.    Objective     Raffy received therapeutic exercises to develop strength, endurance, ROM, posture and core stabilization for 25 minutes includin way bridge - 2 x 10 ea  Alternate arm/hip flexion in supine with focus on core stabilization  SLR 3 x 10 with focus on abdominal stabilization 3 x 10 ea    Raffy received the following manual therapy techniques: Joint mobilizations were applied to the: lumabr spine for 0 minutes, including:  -    Raffy participated in neuromuscular re-education activities to improve: Balance, Coordination, Kinesthetic, Proprioception and Posture for 20 minutes. The following activities were included:  Supine multifidus activation with swiss ball  Paloff Press - GTB 3 x 10 ea      Raffy participated in dynamic functional therapeutic activities to improve functional performance for 0  minutes,  including:  -    Raffy participated in gait training to improve functional mobility and safety for 0  minutes, including:  -    Home Exercises Provided and Patient Education Provided     Education provided:   - Home walking program for cardiorespiratory endurance    Written Home Exercises Provided: Patient instructed to cont prior HEP.  Exercises were reviewed and Raffy was able to demonstrate them prior to the end of the session.  Raffy demonstrated good  understanding of the education provided.     See EMR under Patient Instructions for exercises provided 6/30/2020    Assessment     Held flexion exercises and progressed with extension. He was able to work into extension based treatment without issue. Will hold land based PT until patient is cleared from his surgeon to resume.     Raffy is progressing well towards his goals.   Pt prognosis is Fair.     Pt will continue to benefit from skilled outpatient physical therapy to address the deficits listed in the problem list box on initial evaluation, provide pt/family education and to maximize pt's level of independence in the home and community environment.     Pt's spiritual, cultural and educational needs considered and pt agreeable to plan of care and goals.    Anticipated barriers to physical therapy: chronicity of pain    Goals:  Short Term Goals: 6 weeks   1. Patient will be independent in HEP & progressions  2. Patient will improve single leg balance to 5 secons on ea limb.     Long Term Goals: 12 weeks   1. Patient will have FOTO limitation score of 55% to demonstrate improved overall function & decreased pain  2. Patient will report reduced pain to 0/10 at rest to increase ability to complete work related duties.  3. Patient will increase R hip/knee MMT to 4/5 to demonstrate improved strength  4. Patient will increase R ankle df to 3+/5 to demonstrate improved strength & decreased gait deviations    Plan   Plan of care Certification: 6/11/2019 to  12/31/2020.     Patient will complete 1 more aquatic based PT session and then resume when cleared from his surgeon for post op rehabilitation.     Real Sue, PT, DPT

## 2020-08-10 ENCOUNTER — OFFICE VISIT (OUTPATIENT)
Dept: OTOLARYNGOLOGY | Facility: CLINIC | Age: 70
End: 2020-08-10
Payer: COMMERCIAL

## 2020-08-10 ENCOUNTER — TELEPHONE (OUTPATIENT)
Dept: NEUROSURGERY | Facility: CLINIC | Age: 70
End: 2020-08-10

## 2020-08-10 VITALS — DIASTOLIC BLOOD PRESSURE: 68 MMHG | SYSTOLIC BLOOD PRESSURE: 102 MMHG | HEART RATE: 53 BPM

## 2020-08-10 DIAGNOSIS — Z86.69 HISTORY OF TINNITUS: ICD-10-CM

## 2020-08-10 DIAGNOSIS — Z78.9 HISTORY OF EXCESSIVE CERUMEN: Primary | ICD-10-CM

## 2020-08-10 DIAGNOSIS — H61.23 IMPACTED CERUMEN, BILATERAL: ICD-10-CM

## 2020-08-10 PROCEDURE — 99499 NO LOS: ICD-10-PCS | Mod: S$GLB,,, | Performed by: OTOLARYNGOLOGY

## 2020-08-10 PROCEDURE — 69210 PR REMOVAL IMPACTED CERUMEN REQUIRING INSTRUMENTATION, UNILATERAL: ICD-10-PCS | Mod: S$GLB,,, | Performed by: OTOLARYNGOLOGY

## 2020-08-10 PROCEDURE — 69210 REMOVE IMPACTED EAR WAX UNI: CPT | Mod: S$GLB,,, | Performed by: OTOLARYNGOLOGY

## 2020-08-10 PROCEDURE — 99999 PR PBB SHADOW E&M-EST. PATIENT-LVL IV: CPT | Mod: PBBFAC,,, | Performed by: OTOLARYNGOLOGY

## 2020-08-10 PROCEDURE — 99499 UNLISTED E&M SERVICE: CPT | Mod: S$GLB,,, | Performed by: OTOLARYNGOLOGY

## 2020-08-10 PROCEDURE — 99999 PR PBB SHADOW E&M-EST. PATIENT-LVL IV: ICD-10-PCS | Mod: PBBFAC,,, | Performed by: OTOLARYNGOLOGY

## 2020-08-10 NOTE — PROGRESS NOTES
"CC: Ear cleaning   HPI:Mr. Rutherford is a 70 year old CM who is scheduled for lower back surgery in several weeks.  He is here for ear cleaning procedures today. His last visit for ear cleaning occurred 4/30/19.    Audiometry at that time indicated left ear greater than right high-frequency sensorineural hearing loss with normal SRT scores normal discrimination scores and normal tympanometry.  He indicates chronic ( low back) pain which affects his gait.   He also indicates dysfunction with incapacitating ( " bad" ) tinnitus at times.  His presently learning the [employer]. He has a bass ukelele.  Today is his wife's birthday.      Past Medical History:   Diagnosis Date    Adenomatous polyp 2003    Adenomatous polyp     Allergy     Arthritis     Back pain     after trauma beginning in 195    Cataract     Chronic pain     neck and left shoulder    Cluster headache 2013    Colon polyp     Degenerative disc disease     Depression     Diverticulitis 12/2013    Elevated PSA     Fibromyalgia 2013    GERD (gastroesophageal reflux disease)     Hepatitis 1970's    A    History of prostate biopsy 2002    Hyperlipidemia     Joint pain     Sleep apnea     Special screening for malignant neoplasms, colon 5/5/2014    Thyroid nodule 7/16/2014    Visual disturbance 2012    problems after cataract surgery       PE:Gen.:Alert and oriented Cm in no acute distress  Procedure: Large cerumen impactions are removed from both eacs with an angled pick and suction.   A larger volume of cerumen is removed from the left ear canal compared to the right.  Both TMs are intact when visualized.    DIAGNOSIS:     ICD-10-CM ICD-9-CM    1. History of excessive cerumen  Z78.9 V49.89    2. Impacted cerumen, bilateral  H61.23 380.4    3. History of tinnitus  Z86.69 V12.49      PLAN: C.I.s extracted from both occluded eacs; AS C.I. > AD C.I.   RTC prn ear cleaning     "

## 2020-08-11 ENCOUNTER — TELEPHONE (OUTPATIENT)
Dept: NEUROSURGERY | Facility: CLINIC | Age: 70
End: 2020-08-11

## 2020-08-11 NOTE — TELEPHONE ENCOUNTER
----- Message from Valarie Rutherford, CNS sent at 8/11/2020  9:42 AM CDT -----  Regarding: Surgery changed  Hi  this is Juan wife, he said he has been moved to 20th and has stent on separate day , WHY ? Is there no other urologist to do it while in surgery,?   Valarie Rutherford  ext [phone]

## 2020-08-13 ENCOUNTER — OFFICE VISIT (OUTPATIENT)
Dept: UROLOGY | Facility: CLINIC | Age: 70
End: 2020-08-13
Payer: COMMERCIAL

## 2020-08-13 ENCOUNTER — HOSPITAL ENCOUNTER (OUTPATIENT)
Dept: PREADMISSION TESTING | Facility: HOSPITAL | Age: 70
Discharge: HOME | End: 2020-08-13
Attending: NEUROLOGICAL SURGERY
Payer: COMMERCIAL

## 2020-08-13 VITALS
HEART RATE: 77 BPM | SYSTOLIC BLOOD PRESSURE: 118 MMHG | TEMPERATURE: 98 F | DIASTOLIC BLOOD PRESSURE: 77 MMHG | WEIGHT: 172 LBS | BODY MASS INDEX: 26.07 KG/M2 | HEIGHT: 68 IN

## 2020-08-13 VITALS — WEIGHT: 172 LBS | BODY MASS INDEX: 26.07 KG/M2 | HEIGHT: 68 IN

## 2020-08-13 DIAGNOSIS — M53.2X7 SPINAL INSTABILITY OF LUMBOSACRAL REGION: ICD-10-CM

## 2020-08-13 DIAGNOSIS — M51.37 DDD (DEGENERATIVE DISC DISEASE), LUMBOSACRAL: Primary | ICD-10-CM

## 2020-08-13 PROCEDURE — 99999 PR PBB SHADOW E&M-EST. PATIENT-LVL V: CPT | Mod: PBBFAC,,, | Performed by: STUDENT IN AN ORGANIZED HEALTH CARE EDUCATION/TRAINING PROGRAM

## 2020-08-13 PROCEDURE — 99214 PR OFFICE/OUTPT VISIT, EST, LEVL IV, 30-39 MIN: ICD-10-PCS | Mod: S$GLB,,, | Performed by: STUDENT IN AN ORGANIZED HEALTH CARE EDUCATION/TRAINING PROGRAM

## 2020-08-13 PROCEDURE — 3008F BODY MASS INDEX DOCD: CPT | Mod: CPTII,S$GLB,, | Performed by: STUDENT IN AN ORGANIZED HEALTH CARE EDUCATION/TRAINING PROGRAM

## 2020-08-13 PROCEDURE — 1159F PR MEDICATION LIST DOCUMENTED IN MEDICAL RECORD: ICD-10-PCS | Mod: S$GLB,,, | Performed by: STUDENT IN AN ORGANIZED HEALTH CARE EDUCATION/TRAINING PROGRAM

## 2020-08-13 PROCEDURE — 1159F MED LIST DOCD IN RCRD: CPT | Mod: S$GLB,,, | Performed by: STUDENT IN AN ORGANIZED HEALTH CARE EDUCATION/TRAINING PROGRAM

## 2020-08-13 PROCEDURE — 1125F PR PAIN SEVERITY QUANTIFIED, PAIN PRESENT: ICD-10-PCS | Mod: S$GLB,,, | Performed by: STUDENT IN AN ORGANIZED HEALTH CARE EDUCATION/TRAINING PROGRAM

## 2020-08-13 PROCEDURE — 1125F AMNT PAIN NOTED PAIN PRSNT: CPT | Mod: S$GLB,,, | Performed by: STUDENT IN AN ORGANIZED HEALTH CARE EDUCATION/TRAINING PROGRAM

## 2020-08-13 PROCEDURE — 99214 OFFICE O/P EST MOD 30 MIN: CPT | Mod: S$GLB,,, | Performed by: STUDENT IN AN ORGANIZED HEALTH CARE EDUCATION/TRAINING PROGRAM

## 2020-08-13 PROCEDURE — 99999 PR PBB SHADOW E&M-EST. PATIENT-LVL V: ICD-10-PCS | Mod: PBBFAC,,, | Performed by: STUDENT IN AN ORGANIZED HEALTH CARE EDUCATION/TRAINING PROGRAM

## 2020-08-13 PROCEDURE — 1101F PR PT FALLS ASSESS DOC 0-1 FALLS W/OUT INJ PAST YR: ICD-10-PCS | Mod: CPTII,S$GLB,, | Performed by: STUDENT IN AN ORGANIZED HEALTH CARE EDUCATION/TRAINING PROGRAM

## 2020-08-13 PROCEDURE — 1101F PT FALLS ASSESS-DOCD LE1/YR: CPT | Mod: CPTII,S$GLB,, | Performed by: STUDENT IN AN ORGANIZED HEALTH CARE EDUCATION/TRAINING PROGRAM

## 2020-08-13 PROCEDURE — 3008F PR BODY MASS INDEX (BMI) DOCUMENTED: ICD-10-PCS | Mod: CPTII,S$GLB,, | Performed by: STUDENT IN AN ORGANIZED HEALTH CARE EDUCATION/TRAINING PROGRAM

## 2020-08-13 RX ORDER — SODIUM CHLORIDE, SODIUM LACTATE, POTASSIUM CHLORIDE, CALCIUM CHLORIDE 600; 310; 30; 20 MG/100ML; MG/100ML; MG/100ML; MG/100ML
INJECTION, SOLUTION INTRAVENOUS CONTINUOUS
Status: CANCELLED | OUTPATIENT
Start: 2020-08-13

## 2020-08-13 RX ORDER — LIDOCAINE HYDROCHLORIDE 10 MG/ML
1 INJECTION, SOLUTION EPIDURAL; INFILTRATION; INTRACAUDAL; PERINEURAL ONCE
Status: CANCELLED | OUTPATIENT
Start: 2020-08-13 | End: 2020-08-13

## 2020-08-13 NOTE — PROGRESS NOTES
Subjective:       Patient ID: Raffy Rutherford Jr. is a 70 y.o. male.    Chief Complaint: Other (stent placement )  This is a 70 y.o. male who is a patient of Dr. Caldwell with neurosurgery. The patient has a history of DDD and has elected to undergo spinal fusion.  his surgeon requested preoperative catheter(s) placement to aid in intraoperative identification of the ureter.    The patient also wanted to talk about urinary urgency/frequency refractory to cialis 5mg daily and erectile dysfunction. He agreed to work on this after the neurosurgery procedure. May consider cialis 20mg prescription.    1. I have explained the indication and benefits of proceeding with cystoscopy, placement of left ureteral stent(s), possible retrograde pyelogram(s) and all other indicated procedures with me in the operating room on 8/19/20. This will occur on the same day as his neurosurgery procedure. Alternatives of the procedure were also discussed which include no ureteral stent placement. he understands that this was requested by his neurosurgeon.    The risks included but were not limited to pain (flank pain), infection (urinary tract infection), bleeding (hematuria - pink/red urine), urinary clots (rare), injury to the urethra, bladder, ureter, prostate (if male), inability to place ureteral stent(s). There is a rare situation where flank pain is severe after the ureteral stents are removed and the stents have to be replaced temporarily and then removed after a few weeks. Normal expected symptoms related to the stent were discussed in depth with the patient which include hematuria, urinary urgency, urinary frequency, bladder spasms, and flank pain    2. The ureteral stent was discussed at length. The patient understands he will need to have it removed as the stent is not designed to be indwelling permanently. The time period in which it should remain indwelling is to be determined after surgery. If left indwelling, the sequelae include  pain, infection, lower urinary tract symptoms, development of calcifications on the ureteral stent, worsening kidney function, and complete loss of kidney function. At this point, the patient is planning for cystoscopy, left ureteral stent removal in the clinic.     The patient voiced understanding and all questions have been answered and informed consents were signed.    3. Spoke with Ms. Mobley in outpatient, to try to avoid multiple covid tests for this patient, he will get a rapid on the day of surgery with me on 8/19/20. If negative this test should be good for 24 hours for his case the next day. Will CC francois parada and catie.     LAST PSA  Lab Results   Component Value Date    PSA 6.3 (H) 10/31/2019    PSA 6.1 (H) 01/16/2017    PSA 4.5 (H) 02/21/2014    PSA 4.42 (H) 05/06/2013    PSA 5.47 (H) 10/04/2012    PSA 5.3 (H) 01/03/2011    PSA 4.2 (H) 09/27/2010    PSA 5.2 (H) 08/23/2010    PSA 3.4 07/21/2010    PSA 3.9 05/10/2010    PSA 3.4 04/22/2009    PSA 2.1 10/16/2007    PSA 2.2 05/08/2006    PSA 2.0 01/06/2005    PSA 1.9 01/07/2004    PSADIAG 7.0 (H) 06/05/2020    PSADIAG 8.0 (H) 05/07/2020    PSADIAG 5.6 (H) 10/15/2018    PSADIAG 5.3 (H) 01/25/2018    PSADIAG 7.2 (H) 02/08/2017    PSADIAG 5.9 (H) 01/23/2017    PSADIAG 4.3 (H) 04/28/2015    PSADIAG 4.6 (H) 02/25/2015       Lab Results   Component Value Date    CREATININE 0.8 08/03/2020    ---  Past Medical History:   Diagnosis Date    Adenomatous polyp 2003    Adenomatous polyp     Allergy     Arthritis     Back pain     after trauma beginning in 195    Cataract     Chronic pain     neck and left shoulder    Cluster headache 2013    Colon polyp     Degenerative disc disease     Depression     Diverticulitis 12/2013    Elevated PSA     Fibromyalgia 2013    GERD (gastroesophageal reflux disease)     Hepatitis 1970's    A    History of prostate biopsy 2002    Hyperlipidemia     Joint pain     Sleep apnea     Special screening for malignant  neoplasms, colon 5/5/2014    Thyroid nodule 7/16/2014    Visual disturbance 2012    problems after cataract surgery       Past Surgical History:   Procedure Laterality Date    BACK SURGERY      CATARACT EXTRACTION W/  INTRAOCULAR LENS IMPLANT Bilateral     CHOLECYSTECTOMY      COLONOSCOPY N/A 12/17/2019    Procedure: COLONOSCOPY;  Surgeon: Amadou Hardin MD;  Location: [facility] (4TH FLR);  Service: Endoscopy;  Laterality: N/A;  pt request to do Miralax bowel prep-BB    COSMETIC SURGERY  2/10/2015    Direct mid-forehead brow lift    COSMETIC SURGERY  2/10/2015    Bilateral upper lid blepahroplasty    ESOPHAGOGASTRODUODENOSCOPY N/A 12/17/2019    Procedure: ESOPHAGOGASTRODUODENOSCOPY (EGD);  Surgeon: Amadou Hardin MD;  [facility]  Service: Endoscopy;  Laterality: N/A;    EYE SURGERY      HEMORRHOID SURGERY      with complication of chronic bleeding for 6 weeks     INJECTION OF STEROID Right 12/6/2018    Procedure: INJECTION, STEROID Right SI Joint Block and Steroid Injection;  Surgeon: Jason Caldwell MD;  Location: [facility];  Service: Neurosurgery;  Laterality: Right;  Procedure: Right SI Joint Block and Steroid Injection  Surgery Time: 30 MIN  LOS: 0  Anesthesia: MAC  Radiology: C-arm  Bed: [place] [id number] Poster  Position: Prone    INJECTION OF STEROID Right 9/19/2019    Procedure: INJECTION, STEROID Procedure: Right SI joint block nd steroid injection;  Surgeon: Jason Caldwell MD;  Location: [facility];  Service: Neurosurgery;  Laterality: Right;  Procedure: Right SI joint block nd steroid injection  Surgery Time: 30 mins  LOS:   Anesthesia: General MAC  Radiology:C-arm  Bed: Regular Bed  Position: Prone    JOINT REPLACEMENT      KNEE SURGERY      involving arthroscopic surgery to both knees    SINUS SURGERY      SINUS SURGERY      left molar and sinus surgery for trigeminal neuralgia    SPINAL FUSION  6/22/2015    L3-L5 XLIF    SPINE SURGERY  6/22/15    XLIF/TANA    TOTAL HIP  ARTHROPLASTY  4/2012    Pt states he had total hip replacement on his left hip.       Family History   Problem Relation Age of Onset    Cancer Mother         colon; uterine    Nephrolithiasis Mother     Stroke Mother 86    Pancreatitis Brother     Vision loss Brother         macular degeneration    Diabetes Brother     Macular degeneration Brother     Migraines Sister     No Known Problems Father     No Known Problems Maternal Grandmother     No Known Problems Maternal Grandfather     No Known Problems Paternal Grandmother     No Known Problems Paternal Grandfather     No Known Problems Sister     No Known Problems Maternal Aunt     No Known Problems Maternal Uncle     No Known Problems Paternal Aunt     No Known Problems Paternal Uncle     Melanoma Neg Hx     Amblyopia Neg Hx     Blindness Neg Hx     Cataracts Neg Hx     Glaucoma Neg Hx     Hypertension Neg Hx     Retinal detachment Neg Hx     Strabismus Neg Hx     Thyroid disease Neg Hx     Allergic rhinitis Neg Hx     Allergies Neg Hx     Angioedema Neg Hx     Asthma Neg Hx     Eczema Neg Hx     Urticaria Neg Hx     Rhinitis Neg Hx     Immunodeficiency Neg Hx     Atopy Neg Hx        Social History     Tobacco Use    Smoking status: Never Smoker    Smokeless tobacco: Never Used   Substance Use Topics    Alcohol use: Yes     Frequency: 4 or more times a week     Drinks per session: 1 or 2     Binge frequency: Never     Comment: occasion    Drug use: No       Current Outpatient Medications on File Prior to Visit   Medication Sig Dispense Refill    butalbital-acetaminophen-caffeine -40 mg (FIORICET, ESGIC) -40 mg per tablet Take 1 tablet by mouth 6 hours as needed for Headaches. 30 tablet 0    clonazePAM (KLONOPIN) 0.5 MG tablet Take 2 tablets by mouth at bedtime and 1 tablet as needed for tinnitus 75 tablet 5    cyanocobalamin 1,000 mcg/mL injection       diclofenac sodium (VOLTAREN) 1 % Gel Apply 2 g topically  once daily. 100 g 0    famotidine (PEPCID) 20 MG tablet TAKE 1 TABLET BY MOUTH TWICE A DAY 60 tablet 12    HYDROcodone-acetaminophen (NORCO) 5-325 mg per tablet Take 1 tablet by mouth every 12 (twelve) hours as needed for Pain (moderate to severe). 60 tablet 0    lamoTRIgine (LAMICTAL) 200 MG tablet Take 1 tablet (200 mg total) by mouth once daily. 90 tablet 3    methocarbamoL (ROBAXIN) 750 MG Tab Take 1 tablet (750 mg total) by mouth 3 (three) times daily as needed. 90 tablet 11    pravastatin (PRAVACHOL) 40 MG tablet Take 1 tablet (40 mg total) by mouth once daily. 90 tablet 3    pregabalin (LYRICA) 50 MG capsule Take 1 capsule (50 mg total) by mouth 2 (two) times daily. 60 capsule 5    RABEprazole (ACIPHEX) 20 mg tablet Take 1 tablet (20 mg total) by mouth 2 (two) times daily. 180 tablet 3    selegiline (EMSAM) 12 mg/24 hr Place 1 new patch onto the skin once daily. 90 patch 3    senna-docusate 8.6-50 mg (PERICOLACE) 8.6-50 mg per tablet Take 2 tablets by mouth nightly as needed for Constipation.      sucralfate (CARAFATE) 1 gram tablet TAKE 1 TABLET BY MOUTH 4 TIMES A  tablet 4    tadalafil (CIALIS) 5 MG tablet Take 1 tablet (5 mg total) by mouth once daily. 30 tablet 12    traZODone (DESYREL) 100 MG tablet Take 1 tablet (100 mg total) by mouth every evening. 90 tablet 3    ubrogepant (UBRELVY)tablet 50 mg Take 1 tablet (50 mg total) by mouth as needed for Migraine. (Patient taking differently: Take 50 mg by mouth once as needed for Migraine. ) 10 tablet 1     No current facility-administered medications on file prior to visit.        Review of patient's allergies indicates:   Allergen Reactions    Alphagan [brimonidine]      Patient taking MASO-B Selective Inhibitor Selegiline (Emsam)    Coumadin [warfarin]      itch    Oxycodone      hiccups    Pennsaid [diclofenac sodium] Rash       Review of Systems    Objective:      Physical Exam    Assessment:       1. Spinal instability of  lumbosacral region        Plan:               Spinal instability of lumbosacral region  -     Ambulatory referral/consult to Urology

## 2020-08-13 NOTE — PATIENT INSTRUCTIONS
Avoid bladder irritants including but not limited to caffeine, alcohol, smoking, spicy foods, acidic foods, tomato-based products, citrus, artificial sweeteners, chocolate, coffee or tea.

## 2020-08-13 NOTE — ANESTHESIA PREPROCEDURE EVALUATION
08/13/2020  Raffy Rutherford Jr. is a 70 y.o., male scheduled for L5-S1 fusion on 8/20/2020.    Past Medical History:   Diagnosis Date    Adenomatous polyp 2003    Adenomatous polyp     Allergy     Arthritis     Back pain     after trauma beginning in 195    Cataract     Chronic pain     neck and left shoulder    Cluster headache 2013    Colon polyp     Degenerative disc disease     Depression     Diverticulitis 12/2013    Elevated PSA     Fibromyalgia 2013    GERD (gastroesophageal reflux disease)     Hepatitis 1970's    A    History of prostate biopsy 2002    Hyperlipidemia     Joint pain     Sleep apnea     Special screening for malignant neoplasms, colon 5/5/2014    Thyroid nodule 7/16/2014    Visual disturbance 2012    problems after cataract surgery     Past Surgical History:   Procedure Laterality Date    BACK SURGERY      CATARACT EXTRACTION W/  INTRAOCULAR LENS IMPLANT Bilateral     CHOLECYSTECTOMY      COLONOSCOPY N/A 12/17/2019    Procedure: COLONOSCOPY;  Surgeon: Amadou Hardin MD;  Location: [facility] ([address] [place] [address]);  Service: Endoscopy;  Laterality: N/A;  pt request to do Miralax bowel prep-BB    COSMETIC SURGERY  2/10/2015    Direct mid-forehead brow lift    COSMETIC SURGERY  2/10/2015    Bilateral upper lid blepahroplasty    ESOPHAGOGASTRODUODENOSCOPY N/A 12/17/2019    Procedure: ESOPHAGOGASTRODUODENOSCOPY (EGD);  Surgeon: Amadou Hardin MD;  Location: [facility] ([address] [place] [address]);  Service: Endoscopy;  Laterality: N/A;    EYE SURGERY      HEMORRHOID SURGERY      with complication of chronic bleeding for 6 weeks     INJECTION OF STEROID Right 12/6/2018    Procedure: INJECTION, STEROID Right SI Joint Block and Steroid Injection;  Surgeon: Jason Caldwell MD;  Location: [facility];  Service: Neurosurgery;  Laterality: Right;  Procedure: Right SI Joint Block and Steroid  Injection  Surgery Time: 30 MIN  LOS: 0  Anesthesia: MAC  Radiology: C-arm  Bed: [place] 4 Poster  Position: Prone    INJECTION OF STEROID Right 9/19/2019    Procedure: INJECTION, STEROID Procedure: Right SI joint block nd steroid injection;  Surgeon: Jason Caldwell MD;  Location: [facility];  Service: Neurosurgery;  Laterality: Right;  Procedure: Right SI joint block nd steroid injection  Surgery Time: 30 mins  LOS:   Anesthesia: General MAC  Radiology:C-arm  Bed: Regular Bed  Position: Prone    JOINT REPLACEMENT      KNEE SURGERY      involving arthroscopic surgery to both knees    SINUS SURGERY      SINUS SURGERY      left molar and sinus surgery for trigeminal neuralgia    SPINAL FUSION  6/22/2015    L3-L5 XLIF    SPINE SURGERY  6/22/15    XLIF/TANA    TOTAL HIP ARTHROPLASTY  4/2012    Pt states he had total hip replacement on his left hip.       Anesthesia Evaluation    I have reviewed the Patient Summary Reports.    I have reviewed the Nursing Notes. I have reviewed the NPO Status.   I have reviewed the Medications.     Review of Systems  Anesthesia Hx:  No problems with previous Anesthesia  Denies Family Hx of Anesthesia complications.    Social:  Non-Smoker, Social Alcohol Use    Hematology/Oncology:  Hematology Normal        Cardiovascular:  Cardiovascular Normal Exercise tolerance: good   Denies Angina.        Pulmonary:   Sleep Apnea, CPAP    Renal/:  Renal/ Normal     Hepatic/GI:   GERD    Musculoskeletal:   Arthritis   Spine Disorders: lumbar Disc disease    Neurological:   Headaches    Endocrine:  Endocrine Normal    Psych:   depression          Physical Exam  General:  Well nourished    Airway/Jaw/Neck:  Airway Findings: Mouth Opening: Normal Tongue: Normal  General Airway Assessment: Adult  Mallampati: II  TM Distance: Normal, at least 6 cm       Chest/Lungs:  Chest/Lungs Findings: Clear to auscultation, Normal Respiratory Rate     Heart/Vascular:  Heart Findings: Rate: Normal  Rhythm:  Regular Rhythm  Sounds: Normal        Mental Status:  Mental Status Findings:  Cooperative, Alert and Oriented       EKG 8/3/2020  Sinus bradycardia   Left axis deviation   Abnormal ECG   When compared with ECG of 17-DEC-2018 07:42,   QT has shortened       Anesthesia Plan  Type of Anesthesia, risks & benefits discussed:  Anesthesia Type:  general  Patient's Preference:   Intra-op Monitoring Plan: standard ASA monitors  Intra-op Monitoring Plan Comments:   Post Op Pain Control Plan: multimodal analgesia  Post Op Pain Control Plan Comments:   Induction:   IV  Beta Blocker:  Patient is not currently on a Beta-Blocker (No further documentation required).       Informed Consent: Patient understands risks and agrees with Anesthesia plan.  Questions answered. Anesthesia consent signed with patient.  ASA Score: 2     Day of Surgery Review of History & Physical: I have interviewed and examined the patient. I have reviewed the patient's H&P dated:  There are no significant changes.          Ready For Surgery From Anesthesia Perspective.

## 2020-08-13 NOTE — Clinical Note
My and Jason, please note plan note #3 about just doing 1 rapid test on day of surgery with me on Wed. Spoke with Leandra in outpatient surgery and this test should be good for my case on 8/19 and Javi' case on 8/20.

## 2020-08-13 NOTE — LETTER
August 13, 2020      Jason Caldwell MD  [address]  [address]  [facility] [address]           [place] [facility]  [address]  [facility] [address]  Phone: [phone]          Patient: Raffy Rutherford Jr.   MR Number: [id number]   YOB: 1950   Date of Visit: 8/13/2020       Dear Dr. Jason Caldwell:    Thank you for referring aRffy Rutherford to me for evaluation. Attached you will find relevant portions of my assessment and plan of care.    If you have questions, please do not hesitate to call me. I look forward to following Raffy Rutherford along with you.    Sincerely,    Katelynn George MD    Enclosure  CC:  No Recipients    If you would like to receive this communication electronically, please contact [email or web address] or [phone] to request more information on [employer] access.    For providers and/or their staff who would like to refer a patient to [facility], please contact us through our one-stop-shop provider referral line, [facility], at [phone].    If you feel you have received this communication in error or would no longer like to receive these types of communications, please e-mail [email or web address]

## 2020-08-13 NOTE — H&P (VIEW-ONLY)
Subjective:       Patient ID: Raffy Rutherford Jr. is a 70 y.o. male.    Chief Complaint: Other (stent placement )  This is a 70 y.o. male who is a patient of Dr. Caldwell with neurosurgery. The patient has a history of DDD and has elected to undergo spinal fusion.  his surgeon requested preoperative catheter(s) placement to aid in intraoperative identification of the ureter.    The patient also wanted to talk about urinary urgency/frequency refractory to cialis 5mg daily and erectile dysfunction. He agreed to work on this after the neurosurgery procedure. May consider cialis 20mg prescription.    1. I have explained the indication and benefits of proceeding with cystoscopy, placement of left ureteral stent(s), possible retrograde pyelogram(s) and all other indicated procedures with me in the operating room on 8/19/20. This will occur on the same day as his neurosurgery procedure. Alternatives of the procedure were also discussed which include no ureteral stent placement. he understands that this was requested by his neurosurgeon.    The risks included but were not limited to pain (flank pain), infection (urinary tract infection), bleeding (hematuria - pink/red urine), urinary clots (rare), injury to the urethra, bladder, ureter, prostate (if male), inability to place ureteral stent(s). There is a rare situation where flank pain is severe after the ureteral stents are removed and the stents have to be replaced temporarily and then removed after a few weeks. Normal expected symptoms related to the stent were discussed in depth with the patient which include hematuria, urinary urgency, urinary frequency, bladder spasms, and flank pain    2. The ureteral stent was discussed at length. The patient understands he will need to have it removed as the stent is not designed to be indwelling permanently. The time period in which it should remain indwelling is to be determined after surgery. If left indwelling, the sequelae include  pain, infection, lower urinary tract symptoms, development of calcifications on the ureteral stent, worsening kidney function, and complete loss of kidney function. At this point, the patient is planning for cystoscopy, left ureteral stent removal in the clinic.     The patient voiced understanding and all questions have been answered and informed consents were signed.    3. Spoke with Ms. Mobley in outpatient, to try to avoid multiple covid tests for this patient, he will get a rapid on the day of surgery with me on 8/19/20. If negative this test should be good for 24 hours for his case the next day. Will CC francois parada and catie.     LAST PSA  Lab Results   Component Value Date    PSA 6.3 (H) 10/31/2019    PSA 6.1 (H) 01/16/2017    PSA 4.5 (H) 02/21/2014    PSA 4.42 (H) 05/06/2013    PSA 5.47 (H) 10/04/2012    PSA 5.3 (H) 01/03/2011    PSA 4.2 (H) 09/27/2010    PSA 5.2 (H) 08/23/2010    PSA 3.4 07/21/2010    PSA 3.9 05/10/2010    PSA 3.4 04/22/2009    PSA 2.1 10/16/2007    PSA 2.2 05/08/2006    PSA 2.0 01/06/2005    PSA 1.9 01/07/2004    PSADIAG 7.0 (H) 06/05/2020    PSADIAG 8.0 (H) 05/07/2020    PSADIAG 5.6 (H) 10/15/2018    PSADIAG 5.3 (H) 01/25/2018    PSADIAG 7.2 (H) 02/08/2017    PSADIAG 5.9 (H) 01/23/2017    PSADIAG 4.3 (H) 04/28/2015    PSADIAG 4.6 (H) 02/25/2015       Lab Results   Component Value Date    CREATININE 0.8 08/03/2020    ---  Past Medical History:   Diagnosis Date    Adenomatous polyp 2003    Adenomatous polyp     Allergy     Arthritis     Back pain     after trauma beginning in 195    Cataract     Chronic pain     neck and left shoulder    Cluster headache 2013    Colon polyp     Degenerative disc disease     Depression     Diverticulitis 12/2013    Elevated PSA     Fibromyalgia 2013    GERD (gastroesophageal reflux disease)     Hepatitis 1970's    A    History of prostate biopsy 2002    Hyperlipidemia     Joint pain     Sleep apnea     Special screening for malignant  neoplasms, colon 5/5/2014    Thyroid nodule 7/16/2014    Visual disturbance 2012    problems after cataract surgery       Past Surgical History:   Procedure Laterality Date    BACK SURGERY      CATARACT EXTRACTION W/  INTRAOCULAR LENS IMPLANT Bilateral     CHOLECYSTECTOMY      COLONOSCOPY N/A 12/17/2019    Procedure: COLONOSCOPY;  Surgeon: Amadou Hardin MD;  Location: [facility] (4TH FLR);  Service: Endoscopy;  Laterality: N/A;  pt request to do Miralax bowel prep-BB    COSMETIC SURGERY  2/10/2015    Direct mid-forehead brow lift    COSMETIC SURGERY  2/10/2015    Bilateral upper lid blepahroplasty    ESOPHAGOGASTRODUODENOSCOPY N/A 12/17/2019    Procedure: ESOPHAGOGASTRODUODENOSCOPY (EGD);  Surgeon: Amadou Hardin MD;  [facility]  Service: Endoscopy;  Laterality: N/A;    EYE SURGERY      HEMORRHOID SURGERY      with complication of chronic bleeding for 6 weeks     INJECTION OF STEROID Right 12/6/2018    Procedure: INJECTION, STEROID Right SI Joint Block and Steroid Injection;  Surgeon: Jason Caldwell MD;  Location[facility];  Service: Neurosurgery;  Laterality: Right;  Procedure: Right SI Joint Block and Steroid Injection  Surgery Time: 30 MIN  LOS: 0  Anesthesia: MAC  Radiology: C-arm  Bed: [place] [address] Poster  Position: Prone    INJECTION OF STEROID Right 9/19/2019    Procedure: INJECTION, STEROID Procedure: Right SI joint block nd steroid injection;  Surgeon: Jason Caldwell MD;  Location: [facility];  Service: Neurosurgery;  Laterality: Right;  Procedure: Right SI joint block nd steroid injection  Surgery Time: 30 mins  LOS:   Anesthesia: General MAC  Radiology:C-arm  Bed: Regular Bed  Position: Prone    JOINT REPLACEMENT      KNEE SURGERY      involving arthroscopic surgery to both knees    SINUS SURGERY      SINUS SURGERY      left molar and sinus surgery for trigeminal neuralgia    SPINAL FUSION  6/22/2015    L3-L5 XLIF    SPINE SURGERY  6/22/15    XLIF/TANA    TOTAL HIP  ARTHROPLASTY  4/2012    Pt states he had total hip replacement on his left hip.       Family History   Problem Relation Age of Onset    Cancer Mother         colon; uterine    Nephrolithiasis Mother     Stroke Mother 86    Pancreatitis Brother     Vision loss Brother         macular degeneration    Diabetes Brother     Macular degeneration Brother     Migraines Sister     No Known Problems Father     No Known Problems Maternal Grandmother     No Known Problems Maternal Grandfather     No Known Problems Paternal Grandmother     No Known Problems Paternal Grandfather     No Known Problems Sister     No Known Problems Maternal Aunt     No Known Problems Maternal Uncle     No Known Problems Paternal Aunt     No Known Problems Paternal Uncle     Melanoma Neg Hx     Amblyopia Neg Hx     Blindness Neg Hx     Cataracts Neg Hx     Glaucoma Neg Hx     Hypertension Neg Hx     Retinal detachment Neg Hx     Strabismus Neg Hx     Thyroid disease Neg Hx     Allergic rhinitis Neg Hx     Allergies Neg Hx     Angioedema Neg Hx     Asthma Neg Hx     Eczema Neg Hx     Urticaria Neg Hx     Rhinitis Neg Hx     Immunodeficiency Neg Hx     Atopy Neg Hx        Social History     Tobacco Use    Smoking status: Never Smoker    Smokeless tobacco: Never Used   Substance Use Topics    Alcohol use: Yes     Frequency: 4 or more times a week     Drinks per session: 1 or 2     Binge frequency: Never     Comment: occasion    Drug use: No       Current Outpatient Medications on File Prior to Visit   Medication Sig Dispense Refill    butalbital-acetaminophen-caffeine -40 mg (FIORICET, ESGIC) -40 mg per tablet Take 1 tablet by mouth 6 hours as needed for Headaches. 30 tablet 0    clonazePAM (KLONOPIN) 0.5 MG tablet Take 2 tablets by mouth at bedtime and 1 tablet as needed for tinnitus 75 tablet 5    cyanocobalamin 1,000 mcg/mL injection       diclofenac sodium (VOLTAREN) 1 % Gel Apply 2 g topically  once daily. 100 g 0    famotidine (PEPCID) 20 MG tablet TAKE 1 TABLET BY MOUTH TWICE A DAY 60 tablet 12    HYDROcodone-acetaminophen (NORCO) 5-325 mg per tablet Take 1 tablet by mouth every 12 (twelve) hours as needed for Pain (moderate to severe). 60 tablet 0    lamoTRIgine (LAMICTAL) 200 MG tablet Take 1 tablet (200 mg total) by mouth once daily. 90 tablet 3    methocarbamoL (ROBAXIN) 750 MG Tab Take 1 tablet (750 mg total) by mouth 3 (three) times daily as needed. 90 tablet 11    pravastatin (PRAVACHOL) 40 MG tablet Take 1 tablet (40 mg total) by mouth once daily. 90 tablet 3    pregabalin (LYRICA) 50 MG capsule Take 1 capsule (50 mg total) by mouth 2 (two) times daily. 60 capsule 5    RABEprazole (ACIPHEX) 20 mg tablet Take 1 tablet (20 mg total) by mouth 2 (two) times daily. 180 tablet 3    selegiline (EMSAM) 12 mg/24 hr Place 1 new patch onto the skin once daily. 90 patch 3    senna-docusate 8.6-50 mg (PERICOLACE) 8.6-50 mg per tablet Take 2 tablets by mouth nightly as needed for Constipation.      sucralfate (CARAFATE) 1 gram tablet TAKE 1 TABLET BY MOUTH 4 TIMES A  tablet 4    tadalafil (CIALIS) 5 MG tablet Take 1 tablet (5 mg total) by mouth once daily. 30 tablet 12    traZODone (DESYREL) 100 MG tablet Take 1 tablet (100 mg total) by mouth every evening. 90 tablet 3    ubrogepant (UBRELVY)tablet 50 mg Take 1 tablet (50 mg total) by mouth as needed for Migraine. (Patient taking differently: Take 50 mg by mouth once as needed for Migraine. ) 10 tablet 1     No current facility-administered medications on file prior to visit.        Review of patient's allergies indicates:   Allergen Reactions    Alphagan [brimonidine]      Patient taking MASO-B Selective Inhibitor Selegiline (Emsam)    Coumadin [warfarin]      itch    Oxycodone      hiccups    Pennsaid [diclofenac sodium] Rash       Review of Systems    Objective:      Physical Exam    Assessment:       1. Spinal instability of  lumbosacral region        Plan:               Spinal instability of lumbosacral region  -     Ambulatory referral/consult to Urology

## 2020-08-13 NOTE — DISCHARGE INSTRUCTIONS
Your surgery is scheduled for 8/18/20.    Please report to Front Lobby on the 1st Floor at 5:30a.m.    THIS TIME IS SUBJECT TO CHANGE.  YOU WILL RECEIVE A PHONE CALL THE DAY BEFORE SURGERY BY 3:30 PM TO CONFIRM YOUR TIME OF ARRIVAL.  IF YOU HAVE NOT RECEIVED A PHONE CALL BY 3:30 PM THE DAY BEFORE YOUR SURGERY PLEASE CALL [phone]     INSTRUCTIONS IMPORTANT!!!  ¨ Do not eat or drink after 12 midnight-including water. OK to brush teeth, no   gum, candy or mints!    ¨ Take only these medicines with a small swallow of water-morning of surgery: lamotrigine and aciphex        ____  Proceed to [facility] on 8/13/20 for additional testing.        ____  No powder, lotions or creams to surgical area.  ____  Please remove all jewelry, including piercings and leave at home.  ____  No money or valuables needed. Please leave at home.  ____  Please bring any documents given by your doctor.  ____  If going home the same day, arrange for a ride home. You will not be able to             drive if Anesthesia was used.  ____  Children under 18 years require a parent / guardian present the entire time             they are in surgery / recovery.  ____  Wear loose fitting clothing. Allow for dressings, bandages.  ____  Stop Aspirin, Ibuprofen, Motrin and Aleve at least 3-5 days before surgery, unless otherwise instructed by your doctor, or the nurse.   You MAY use Tylenol/acetaminophen until day of surgery.  ____  Wash the surgical area with Hibiclens the night before surgery, and again the             morning of surgery.  Be sure to rinse hibiclens off completely (if instructed by   nurse).  ____  If you take diabetic medication, do not take am of surgery unless instructed by Doctor.  ____  Call MD for temperature above 101 degrees or any other signs of infection such as Urinary (bladder) infection, Upper respiratory infection, skin boils, etc.  ____ Stop taking any Fish Oil supplement or any Vitamins that contain  Vitamin E at least 5 days prior to surgery.  ____ Do Not wear your contact lenses the day of your procedure.  You may wear your glasses.      ____Do not shave surgical site for 3 days prior to surgery.  ____ Practice Good hand washing before, during, and after procedure.      I have read or had read and explained to me, and understand the above information.  Additional comments or instructions:  For additional questions call [phone]      ANESTHESIA SIDE EFFECTS  -For the first 24 hours after surgery:  Do not drive, use heavy equipment, make important decisions, or drink alcohol  -It is normal to feel sleepy for several hours.  Rest until you are more awake.  -Have someone stay with you, if needed.  They can watch for problems and help keep you safe.  -Some possible post anesthesia side effects include: nausea and vomiting, sore throat and hoarseness, sleepiness, and dizziness.        Pre-Op Bathing Instructions    Before surgery, you can play an important role in your own health.    Because skin is not sterile, we need to be sure that your skin is as free of germs as possible. By following the instructions below, you can reduce the number of germs on your skin before surgery.    IMPORTANT: You will need to shower with a special soap called Hibiclens*, available at any pharmacy.  If you are allergic to Chlorhexidine (the antiseptic in Hibiclens), use an antibacterial soap such as Dial Soap for your preoperative shower.  You will shower with Hibiclens both the night before your surgery and the morning of your surgery.  Do not use Hibiclens on the head, face or genitals to avoid injury to those areas.    STEP #1: THE NIGHT BEFORE YOUR SURGERY     1. Do not shave the area of your body where your surgery will be performed.  2. Shower and wash your hair and body as usual with your normal soap and shampoo.  3. Rinse your hair and body thoroughly after you shower to remove all soap residue.  4. With your hand, apply one  packet of Hibiclens soap to the surgical site.   5. Wash the site gently for five (5) minutes. Do not scrub your skin too hard.   6. Do not wash with your regular soap after Hibiclens is used.  7. Rinse your body thoroughly.  8. Pat yourself dry with a clean, soft towel.  9. Do not use lotion, cream, or powder.  10. Wear clean clothes.    STEP #2: THE MORNING OF YOUR SURGERY     1. Repeat Step #1.    * Not to be used by people allergic to Chlorhexidine.

## 2020-08-17 ENCOUNTER — ANESTHESIA EVENT (OUTPATIENT)
Dept: SURGERY | Facility: HOSPITAL | Age: 70
End: 2020-08-17
Payer: COMMERCIAL

## 2020-08-18 ENCOUNTER — ANESTHESIA (OUTPATIENT)
Dept: SURGERY | Facility: HOSPITAL | Age: 70
DRG: 460 | End: 2020-08-18
Payer: COMMERCIAL

## 2020-08-19 ENCOUNTER — ANESTHESIA (OUTPATIENT)
Dept: SURGERY | Facility: HOSPITAL | Age: 70
End: 2020-08-19
Payer: COMMERCIAL

## 2020-08-19 ENCOUNTER — HOSPITAL ENCOUNTER (OUTPATIENT)
Facility: HOSPITAL | Age: 70
Discharge: HOME | End: 2020-08-19
Attending: STUDENT IN AN ORGANIZED HEALTH CARE EDUCATION/TRAINING PROGRAM | Admitting: STUDENT IN AN ORGANIZED HEALTH CARE EDUCATION/TRAINING PROGRAM
Payer: COMMERCIAL

## 2020-08-19 VITALS
OXYGEN SATURATION: 99 % | WEIGHT: 172 LBS | TEMPERATURE: 98 F | HEIGHT: 68 IN | HEART RATE: 52 BPM | RESPIRATION RATE: 18 BRPM | DIASTOLIC BLOOD PRESSURE: 71 MMHG | BODY MASS INDEX: 26.07 KG/M2 | SYSTOLIC BLOOD PRESSURE: 126 MMHG

## 2020-08-19 DIAGNOSIS — M51.37 DDD (DEGENERATIVE DISC DISEASE), LUMBOSACRAL: ICD-10-CM

## 2020-08-19 PROBLEM — M51.379 DDD (DEGENERATIVE DISC DISEASE), LUMBOSACRAL: Status: ACTIVE | Noted: 2020-08-19

## 2020-08-19 LAB — SARS-COV-2 RDRP RESP QL NAA+PROBE: NEGATIVE

## 2020-08-19 PROCEDURE — 63600175 PHARM REV CODE 636 W HCPCS: Performed by: NURSE ANESTHETIST, CERTIFIED REGISTERED

## 2020-08-19 PROCEDURE — 52332 PR CYSTOSCOPY,INSERT URETERAL STENT: ICD-10-PCS | Mod: LT,,, | Performed by: STUDENT IN AN ORGANIZED HEALTH CARE EDUCATION/TRAINING PROGRAM

## 2020-08-19 PROCEDURE — 71000016 HC POSTOP RECOV ADDL HR: Performed by: STUDENT IN AN ORGANIZED HEALTH CARE EDUCATION/TRAINING PROGRAM

## 2020-08-19 PROCEDURE — 74420 PR  X-RAY RETROGRADE PYELOGRAM: ICD-10-PCS | Mod: 26,,, | Performed by: STUDENT IN AN ORGANIZED HEALTH CARE EDUCATION/TRAINING PROGRAM

## 2020-08-19 PROCEDURE — 71000015 HC POSTOP RECOV 1ST HR: Performed by: STUDENT IN AN ORGANIZED HEALTH CARE EDUCATION/TRAINING PROGRAM

## 2020-08-19 PROCEDURE — C1758 CATHETER, URETERAL: HCPCS | Performed by: STUDENT IN AN ORGANIZED HEALTH CARE EDUCATION/TRAINING PROGRAM

## 2020-08-19 PROCEDURE — 25000003 PHARM REV CODE 250: Performed by: NURSE ANESTHETIST, CERTIFIED REGISTERED

## 2020-08-19 PROCEDURE — 36000707: Performed by: STUDENT IN AN ORGANIZED HEALTH CARE EDUCATION/TRAINING PROGRAM

## 2020-08-19 PROCEDURE — 37000009 HC ANESTHESIA EA ADD 15 MINS: Performed by: STUDENT IN AN ORGANIZED HEALTH CARE EDUCATION/TRAINING PROGRAM

## 2020-08-19 PROCEDURE — C1769 GUIDE WIRE: HCPCS | Performed by: STUDENT IN AN ORGANIZED HEALTH CARE EDUCATION/TRAINING PROGRAM

## 2020-08-19 PROCEDURE — 52332 CYSTOSCOPY AND TREATMENT: CPT | Mod: LT,,, | Performed by: STUDENT IN AN ORGANIZED HEALTH CARE EDUCATION/TRAINING PROGRAM

## 2020-08-19 PROCEDURE — 74420 UROGRAPHY RTRGR +-KUB: CPT | Mod: 26,,, | Performed by: STUDENT IN AN ORGANIZED HEALTH CARE EDUCATION/TRAINING PROGRAM

## 2020-08-19 PROCEDURE — 36000706: Performed by: STUDENT IN AN ORGANIZED HEALTH CARE EDUCATION/TRAINING PROGRAM

## 2020-08-19 PROCEDURE — 37000008 HC ANESTHESIA 1ST 15 MINUTES: Performed by: STUDENT IN AN ORGANIZED HEALTH CARE EDUCATION/TRAINING PROGRAM

## 2020-08-19 PROCEDURE — U0002 COVID-19 LAB TEST NON-CDC: HCPCS

## 2020-08-19 PROCEDURE — C2617 STENT, NON-COR, TEM W/O DEL: HCPCS | Performed by: STUDENT IN AN ORGANIZED HEALTH CARE EDUCATION/TRAINING PROGRAM

## 2020-08-19 PROCEDURE — 25500020 PHARM REV CODE 255: Performed by: STUDENT IN AN ORGANIZED HEALTH CARE EDUCATION/TRAINING PROGRAM

## 2020-08-19 DEVICE — STENT SET URETERAL 6X28CM: Type: IMPLANTABLE DEVICE | Site: URETER | Status: FUNCTIONAL

## 2020-08-19 RX ORDER — PROPOFOL 10 MG/ML
INJECTION, EMULSION INTRAVENOUS CONTINUOUS PRN
Status: DISCONTINUED | OUTPATIENT
Start: 2020-08-19 | End: 2020-08-19

## 2020-08-19 RX ORDER — LIDOCAINE HCL/PF 100 MG/5ML
SYRINGE (ML) INTRAVENOUS
Status: DISCONTINUED | OUTPATIENT
Start: 2020-08-19 | End: 2020-08-19

## 2020-08-19 RX ORDER — FENTANYL CITRATE 50 UG/ML
INJECTION, SOLUTION INTRAMUSCULAR; INTRAVENOUS
Status: DISCONTINUED | OUTPATIENT
Start: 2020-08-19 | End: 2020-08-19

## 2020-08-19 RX ORDER — CEFAZOLIN SODIUM 2 G/50ML
2 SOLUTION INTRAVENOUS
Status: DISCONTINUED | OUTPATIENT
Start: 2020-08-19 | End: 2020-08-19 | Stop reason: HOSPADM

## 2020-08-19 RX ORDER — EPHEDRINE SULFATE 50 MG/ML
INJECTION, SOLUTION INTRAVENOUS
Status: DISCONTINUED | OUTPATIENT
Start: 2020-08-19 | End: 2020-08-19

## 2020-08-19 RX ORDER — MIDAZOLAM HYDROCHLORIDE 1 MG/ML
INJECTION, SOLUTION INTRAMUSCULAR; INTRAVENOUS
Status: DISCONTINUED | OUTPATIENT
Start: 2020-08-19 | End: 2020-08-19

## 2020-08-19 RX ORDER — GLYCOPYRROLATE 0.2 MG/ML
INJECTION INTRAMUSCULAR; INTRAVENOUS
Status: DISCONTINUED | OUTPATIENT
Start: 2020-08-19 | End: 2020-08-19

## 2020-08-19 RX ORDER — SODIUM CHLORIDE 9 MG/ML
INJECTION, SOLUTION INTRAVENOUS CONTINUOUS PRN
Status: DISCONTINUED | OUTPATIENT
Start: 2020-08-19 | End: 2020-08-19

## 2020-08-19 RX ORDER — CEFAZOLIN SODIUM 1 G/3ML
INJECTION, POWDER, FOR SOLUTION INTRAMUSCULAR; INTRAVENOUS
Status: DISCONTINUED | OUTPATIENT
Start: 2020-08-19 | End: 2020-08-19

## 2020-08-19 RX ADMIN — EPHEDRINE SULFATE 10 MG: 50 INJECTION, SOLUTION INTRAMUSCULAR; INTRAVENOUS; SUBCUTANEOUS at 11:08

## 2020-08-19 RX ADMIN — MIDAZOLAM 1 MG: 1 INJECTION INTRAMUSCULAR; INTRAVENOUS at 10:08

## 2020-08-19 RX ADMIN — GLYCOPYRROLATE 0.2 MG: 0.2 INJECTION, SOLUTION INTRAMUSCULAR; INTRAVENOUS at 11:08

## 2020-08-19 RX ADMIN — LIDOCAINE HYDROCHLORIDE 60 MG: 20 INJECTION, SOLUTION INTRAVENOUS at 10:08

## 2020-08-19 RX ADMIN — SODIUM CHLORIDE: 0.9 INJECTION, SOLUTION INTRAVENOUS at 10:08

## 2020-08-19 RX ADMIN — PROPOFOL 50 MCG/KG/MIN: 10 INJECTION, EMULSION INTRAVENOUS at 10:08

## 2020-08-19 RX ADMIN — FENTANYL CITRATE 25 MCG: 50 INJECTION, SOLUTION INTRAMUSCULAR; INTRAVENOUS at 11:08

## 2020-08-19 RX ADMIN — CEFAZOLIN 2 G: 330 INJECTION, POWDER, FOR SOLUTION INTRAMUSCULAR; INTRAVENOUS at 10:08

## 2020-08-19 NOTE — INTERVAL H&P NOTE
The patient has been examined and the H&P has been reviewed:    I concur with the findings and no changes have occurred since H&P was written.    Surgery risks, benefits and alternative options discussed and understood by patient/family.          Active Hospital Problems    Diagnosis  POA    DDD (degenerative disc disease), lumbosacral [M51.37]  Yes      Resolved Hospital Problems   No resolved problems to display.

## 2020-08-19 NOTE — ANESTHESIA POSTPROCEDURE EVALUATION
Anesthesia Post Evaluation    Patient: Raffy Rutherford Jr.    Procedure(s) Performed: Procedure(s) (LRB):  CYSTOSCOPY, WITH URETERAL STENT INSERTION, left retrograde pyelogram (Left)    Final Anesthesia Type: MAC    Patient location during evaluation: OPS  Patient participation: Yes- Able to Participate  Level of consciousness: awake and alert and oriented  Post-procedure vital signs: reviewed and stable  Pain management: adequate  Airway patency: patent    PONV status at discharge: No PONV  Anesthetic complications: no      Cardiovascular status: blood pressure returned to baseline, hemodynamically stable and stable  Respiratory status: unassisted, spontaneous ventilation and room air  Hydration status: euvolemic  Follow-up not needed.          Vitals Value Taken Time   BP  08/19/20 1151   Temp  08/19/20 1151   Pulse  08/19/20 1151   Resp  08/19/20 1151   SpO2  08/19/20 1151         No case tracking events are documented in the log.      Pain/Tequila Score: No data recorded

## 2020-08-19 NOTE — PLAN OF CARE
Received from OR per stretcher. Monitor applied. VSS. Rouses to voice. See flow sheets.  Family updated per Text system. PO fluids served.

## 2020-08-19 NOTE — OP NOTE
OPERATIVE DICTATION  DATE OF OPERATION: 08/19/2020    SERVICE: Urology    SURGEONS:  1. Katelynn George MD    ANESTHESIA:  Anesthesiologist: Kavin Alvarez MD  CRNA: Mala Feliciano CRNA    STAFF:  Circulator: Heaven Azul RN; Vickie Drew RN  Scrub Person: Love Renee, JENNIFER; Maeve Chan    ANESTHESIA: Local MAC    PREOPERATIVE DIAGNOSIS: Pre-Op Diagnosis Codes:     * DDD (degenerative disc disease), lumbosacral [M51.37]    POSTOPERATIVE DIAGNOSIS: Post-Op Diagnosis Codes:     * DDD (degenerative disc disease), lumbosacral [M51.37]    PROCEDURES:   1. Cystoscopy, placement of left ureteral stent(s) 6 Georgian x 28cm JJ ureteral stent OFF A STRING   2. Cystoscopy, left retrograde pyelogram  3. Fluoroscopy < 1 hour  4. Interpretation of fluoroscopic images    COMPLICATIONS: * No complications entered in OR log *    DRAINS: None    TUBES: None    IMPLANTS:   Implant Name Type Inv. Item Serial No.  Lot No. LRB No. Used Action   STENT SET URETERAL [id number] - [id number]  STENT SET URETERAL [id number]  [employer]. [id number] Left 1 Implanted       FLUIDS: see anesthesia record     ESTIMATED BLOOD LOSS: * No values recorded between 8/19/2020 11:32 AM and 8/19/2020 11:41 AM *    FINDINGS:   1. Enlarged prostate, moderately enlarged lateral lobes, median lobe enlargement    SPECIMEN(S):   None    CONDITION: stable    INDICATIONS FOR THE PROCEDURE:  This is a 70 y.o. male who is a patient of Dr. Caldwell with neurosurgery. The patient has a history of DDD and has elected to undergo spinal fusion.  his surgeon requested preoperative catheter(s) placement to aid in intraoperative identification of the ureter.     The patient also wanted to talk about urinary urgency/frequency refractory to cialis 5mg daily and erectile dysfunction. He agreed to work on this after the neurosurgery procedure. May consider cialis 20mg prescription.     1. I have explained the indication and benefits of proceeding with cystoscopy,  placement of left ureteral stent(s), possible retrograde pyelogram(s) and all other indicated procedures with me in the operating room on 8/19/20. This will occur on the same day as his neurosurgery procedure. Alternatives of the procedure were also discussed which include no ureteral stent placement. he understands that this was requested by his neurosurgeon.     The risks included but were not limited to pain (flank pain), infection (urinary tract infection), bleeding (hematuria - pink/red urine), urinary clots (rare), injury to the urethra, bladder, ureter, prostate (if male), inability to place ureteral stent(s). There is a rare situation where flank pain is severe after the ureteral stents are removed and the stents have to be replaced temporarily and then removed after a few weeks. Normal expected symptoms related to the stent were discussed in depth with the patient which include hematuria, urinary urgency, urinary frequency, bladder spasms, and flank pain     2. The ureteral stent was discussed at length. The patient understands he will need to have it removed as the stent is not designed to be indwelling permanently. The time period in which it should remain indwelling is to be determined after surgery. If left indwelling, the sequelae include pain, infection, lower urinary tract symptoms, development of calcifications on the ureteral stent, worsening kidney function, and complete loss of kidney function. At this point, the patient is planning for cystoscopy, left ureteral stent removal in the clinic.     PROCEDURE IN DETAIL:     After appropriate informed consent was obtained, the patient was taken to the operating room and placed in the supine position. After induction of Local MAC, he was placed in the dorsal lithotomy position. he was prepped and draped in the usual sterile fashion.     Thereafter a WHO timeout was performed and the procedure was initiated. The procedure began by inserting a 22 Malay  rigid cystoscope into the bladder via the urethra. The findings in the urethra included no stricture, the lateral lobes of the prostate were moderately enlarged the median lobe of the prostate was also enlarged.  The bladder was systematically inspected and the findings included no abnormalities, right and left ureteral orifices seen.    Attention was then turned towards the left ureteral orifice. It was cannulated with a Motion guidewire. The wire was then advanced and noted to coil in the left renal pelvis fluoroscopically.     A left retrograde pyelogram was performed to confirm that the guidewire was in the correct location. A 5 Martiniquais open ended catheter was advanced over the guidewire and the guidewire was removed leaving the 5 Martiniquais open ended catheter in place. Dilute isovue was injected through the 5 Martiniquais open ended catheter to perform the left retrograde pyelogram. The findings demonstrated that the guidewire was in the correct location.     The guidewire was replaced through the 5 Martiniquais open ended catheter and the 5 Martiniquais open ended catheter was removed.     A 6 Georgian x 28 cm JJ ureteral stent OFF A STRING was placed over the guidewire. It was loaded over the guidewire and then the pusher was loaded second to position the stent in place. The radiopaque marker was positioned over the pubic bone and the guidewire was removed noting a good proximal coil formation fluoroscopically in the left renal pelvis as well as a distal coil in the bladder.     The rigid cystoscope was then inserted to drain the bladder of all fluid contents.    This concluded the procedure.  All surgical counts were correct.     ATTENDING ATTESTATION  I was present and scrubbed for the entire duration  of the procedure.      CASE DURATION:  * Missing case tracking time(s) *    DISPOSITION:   The patient tolerated the procedure well. he was extubated, and taken to post-anesthesia care unit in satisfactory condition.  The patient  will be discharged later today, he will undergo his neurosurgery procedure tomorrow. He will followup with me in about 2 weeks time for a cysto, stent removal in clinic. Will have my staff go ahead and tentatively schedule.     Katelynn George MD

## 2020-08-19 NOTE — TRANSFER OF CARE
"Anesthesia Transfer of Care Note    Patient: Raffy Rutherford Jr.    Procedure(s) Performed: Procedure(s) (LRB):  CYSTOSCOPY, WITH URETERAL STENT INSERTION, left retrograde pyelogram (Left)    Patient location: OPS    Anesthesia Type: MAC    Transport from OR: Transported from OR on room air with adequate spontaneous ventilation    Post pain: adequate analgesia    Post assessment: no apparent anesthetic complications and tolerated procedure well    Post vital signs: stable    Level of consciousness: awake, alert and oriented    Nausea/Vomiting: no nausea/vomiting    Complications: none    Transfer of care protocol was followed      Last vitals:   Visit Vitals  /74   Pulse (!) 55   Temp 37.2 °C (99 °F) (Temporal)   Resp 18   Ht 5' 8" (1.727 m)   Wt 78 kg (172 lb)   SpO2 95%   BMI 26.15 kg/m²     "

## 2020-08-19 NOTE — DISCHARGE INSTRUCTIONS
Ureteral Stents  A ureteral stent is a soft plastic tube with holes in it. Its temporarily inserted into a ureter to help drain urine into the bladder. One end goes in the kidney. The other end goes in the bladder. A coil on each end holds the stent in place. The stent cant be seen from outside the body. It shouldnt interfere with your normal routine. Your stent will be put in by a doctor trained in treating the urinary tract (a urologist) or another specialist. The procedure is done in a hospital or surgery center. Youll likely go home the same day.  When is a ureteral stent used?  A ureteral stent may be used:  · To bypass a blockage in a kidney or ureter.  · During kidney stone removal.  · To let a ureter heal after surgery.    Before the Procedure  Your healthcare provider will give you instructions to prepare for the procedure. X-rays or other imaging tests of your kidneys and ureters may be done beforehand.  During the procedure  · You receive medicine to prevent pain and help you relax or sleep during the procedure. Once this takes effect, the procedure starts.  · The doctor inserts a cystoscope (lighted instrument) through the urethra and into the bladder. This shows the opening to the ureter.  · A thin wire is carefully threaded through the cystoscope, up the ureter, and into the kidney. The stent is inserted over the wire.  · A fluoroscope (special X-ray machine) is used to help position the stent. When the stent is in place, the wire and cystoscope are removed.  While you have a stent  · Some discomfort is normal. Certain movements may trigger pain or a feeling that you need to urinate. You may also feel mild soreness or pressure before or during urination. These symptoms will go away a few days after the stent is removed.  · Medicine to control pain or bladder spasms or to prevent infection may be prescribed. Take this as directed.  · Drink plenty of fluids to help flush out your urinary  tract.  · Your urine may be slightly pink or red. This is due to bleeding caused by minor irritation from the stent. This may happen on and off while you have the stent.  · As with any synthetic device placed in the body, there is a risk of infection. The stent may have to be removed if this happens.   How long will you need a stent?  The stent is often taken out after the blockage in the ureter is treated or the ureter has healed. This may take 1 week to 2 weeks, or longer. If a stent is needed for a long time, it may need to be changed every few months.  When to call your healthcare provider  Contact your healthcare provider right away if:  · Your urine contains blood clots or you see a large amount of blood-tinged urine  · You have symptoms similar to those you had before the stent was placed  · You constantly leak urine  · You have a fever over 100.4°F (38°C), chills, nausea, or vomiting  · Your pain is not relieved with medicine  · The end of the stent comes out of the urethra   Date Last Reviewed: 1/1/2017  © 0600-5192 [employer]. [address] [place] [address]. All rights reserved. This information is not intended as a substitute for professional medical care. Always follow your healthcare professional's instructions.

## 2020-08-19 NOTE — ANESTHESIA POSTPROCEDURE EVALUATION
Anesthesia Post Evaluation    Patient: Raffy Rutherford Jr.    Procedure(s) Performed: Procedure(s) (LRB):  CYSTOSCOPY, WITH URETERAL STENT INSERTION, left retrograde pyelogram (Left)    Final Anesthesia Type: MAC    Patient location during evaluation: [facility]  Patient participation: Yes- Able to Participate  Level of consciousness: awake and alert  Post-procedure vital signs: reviewed and stable  Pain management: adequate  Airway patency: patent    PONV status at discharge: No PONV  Anesthetic complications: no      Cardiovascular status: blood pressure returned to baseline  Respiratory status: unassisted  Hydration status: euvolemic  Follow-up not needed.          Vitals Value Taken Time   /62 08/19/20 1150   Temp 36.6 °C (97.9 °F) 08/19/20 1150   Pulse 75 08/19/20 1150   Resp 18 08/19/20 1150   SpO2 95 % 08/19/20 1150         No case tracking events are documented in the log.      Pain/Tequila Score: No data recorded

## 2020-08-19 NOTE — DISCHARGE SUMMARY
[facility]  Urology  Discharge Summary      Patient Name: Raffy Rutherford Jr.  MRN: [id number]  Admission Date: 8/19/2020  Hospital Length of Stay: 0 days  Discharge Date: 08/19/2020  Attending Physician: Katelynn George MD   Discharging Provider: Katelynn George MD  Primary Care Physician: Nini Rendon MD    HPI: The patient is a 70 y.o. male with past medical history (listed below) degenerative disc disease, his neurosurgeon requested a left ureteral stent placement to aid in intraoperative identification of the ureter.    The patient elected to proceed with the procedure(s) below. Please see H&P and/or clinic progress note(s) for full details.     Procedure(s) (LRB):  CYSTOSCOPY, WITH URETERAL STENT INSERTION, left retrograde pyelogram (Left)     Past Medical History:   Diagnosis Date    Adenomatous polyp 2003    Adenomatous polyp     Allergy     Arthritis     Back pain     after trauma beginning in 195    Cataract     Chronic pain     neck and left shoulder    Cluster headache 2013    Colon polyp     Degenerative disc disease     Depression     Diverticulitis 12/2013    Elevated PSA     Fibromyalgia 2013    GERD (gastroesophageal reflux disease)     Hepatitis 1970's    A    History of prostate biopsy 2002    Hyperlipidemia     Joint pain     Sleep apnea     Special screening for malignant neoplasms, colon 5/5/2014    Thyroid nodule 7/16/2014    Visual disturbance 2012    problems after cataract surgery       Hospital Course (synopsis of major diagnoses, care, treatment, and services provided during the course of the hospital stay):        Consults:     Significant Diagnostic Studies:      Pending Diagnostic Studies:     Procedure Component Value Units Date/Time    SURG FL Surgery Fluoro Usage [[id number]]     Order Status: Sent Lab Status: No result           Final Active Diagnoses:    Diagnosis Date Noted POA    DDD (degenerative disc disease), lumbosacral [M51.37]  08/19/2020 Yes      Problems Resolved During this Admission:       Discharged Condition: good    Disposition: Home or Self Care    Follow Up:  Follow-up Information     Katelynn George MD.    Specialty: Urology  Why: 2 weeks in clinic for cysto, stent removal  Contact information:  [address]  [address]  [place] [address]  [phone]                   Patient Instructions:      Diet Adult Regular     Activity as tolerated       Medications:  Reconciled Home Medications:      Medication List      CHANGE how you take these medications    ubrogepant 50 mg tablet  Generic drug: ubrogepant  Take 1 tablet (50 mg total) by mouth as needed for Migraine.  What changed: when to take this        CONTINUE taking these medications    butalbital-acetaminophen-caffeine -40 mg -40 mg per tablet  Commonly known as: FIORICET, ESGIC  Take 1 tablet by mouth 6 hours as needed for Headaches.     clonazePAM 0.5 MG tablet  Commonly known as: KLONOPIN  Take 2 tablets by mouth at bedtime and 1 tablet as needed for tinnitus     cyanocobalamin 1,000 mcg/mL injection     diclofenac sodium 1 % Gel  Commonly known as: VOLTAREN  Apply 2 g topically once daily.     EMSAM 12 mg/24 hr  Generic drug: selegiline  Place 1 new patch onto the skin once daily.     famotidine 20 MG tablet  Commonly known as: PEPCID  TAKE 1 TABLET BY MOUTH TWICE A DAY     HYDROcodone-acetaminophen 5-325 mg per tablet  Commonly known as: NORCO  Take 1 tablet by mouth every 12 (twelve) hours as needed for Pain (moderate to severe).     lamoTRIgine 200 MG tablet  Commonly known as: LAMICTAL  Take 1 tablet (200 mg total) by mouth once daily.     methocarbamoL 750 MG Tab  Commonly known as: ROBAXIN  Take 1 tablet (750 mg total) by mouth 3 (three) times daily as needed.     pravastatin 40 MG tablet  Commonly known as: PRAVACHOL  Take 1 tablet (40 mg total) by mouth once daily.     pregabalin 50 MG capsule  Commonly known as: LYRICA  Take 1 capsule (50 mg total)  by mouth 2 (two) times daily.     RABEprazole 20 mg tablet  Commonly known as: ACIPHEX  Take 1 tablet (20 mg total) by mouth 2 (two) times daily.     senna-docusate 8.6-50 mg 8.6-50 mg per tablet  Commonly known as: PERICOLACE  Take 2 tablets by mouth nightly as needed for Constipation.     sucralfate 1 gram tablet  Commonly known as: CARAFATE  TAKE 1 TABLET BY MOUTH 4 TIMES A DAY     tadalafiL 5 MG tablet  Commonly known as: CIALIS  Take 1 tablet (5 mg total) by mouth once daily.     traZODone 100 MG tablet  Commonly known as: DESYREL  Take 1 tablet (100 mg total) by mouth every evening.            Time spent on the discharge of patient: 15 minutes    Katelynn George MD  Urology  [facility]

## 2020-08-19 NOTE — PLAN OF CARE
Meets criteria for discharge. VSS, AAOx3. Tolerating po fluids without nausea. IV discontinued with tip intact.Discharge instructions given. Time allowed for questions. Verbalizes understanding.  .Discharged to home in good condition.    Patient and spouse given arrival time for surgery tomorrow. 6;15-6:30. Npo after midnight, same meds to be taken as this am. Verbalizes understanding.

## 2020-08-19 NOTE — ANESTHESIA PREPROCEDURE EVALUATION
08/19/2020  Raffy Rutherford Jr. is a 70 y.o., male for cystoscopy    Past Medical History:   Diagnosis Date    Adenomatous polyp 2003    Adenomatous polyp     Allergy     Arthritis     Back pain     after trauma beginning in 195    Cataract     Chronic pain     neck and left shoulder    Cluster headache 2013    Colon polyp     Degenerative disc disease     Depression     Diverticulitis 12/2013    Elevated PSA     Fibromyalgia 2013    GERD (gastroesophageal reflux disease)     Hepatitis 1970's    A    History of prostate biopsy 2002    Hyperlipidemia     Joint pain     Sleep apnea     Special screening for malignant neoplasms, colon 5/5/2014    Thyroid nodule 7/16/2014    Visual disturbance 2012    problems after cataract surgery     Past Surgical History:   Procedure Laterality Date    BACK SURGERY      CATARACT EXTRACTION W/  INTRAOCULAR LENS IMPLANT Bilateral     CHOLECYSTECTOMY      COLONOSCOPY N/A 12/17/2019    Procedure: COLONOSCOPY;  Surgeon: Amadou Hardin MD;  [facility]  Service: Endoscopy;  Laterality: N/A;  pt request to do Miralax bowel prep-BB    COSMETIC SURGERY  2/10/2015    Direct mid-forehead brow lift    COSMETIC SURGERY  2/10/2015    Bilateral upper lid blepahroplasty    ESOPHAGOGASTRODUODENOSCOPY N/A 12/17/2019    Procedure: ESOPHAGOGASTRODUODENOSCOPY (EGD);  Surgeon: Amadou Hardin MD;  Location: [facility] ([address] [place] [address]);  Service: Endoscopy;  Laterality: N/A;    EYE SURGERY      HEMORRHOID SURGERY      with complication of chronic bleeding for 6 weeks     INJECTION OF STEROID Right 12/6/2018    Procedure: INJECTION, STEROID Right SI Joint Block and Steroid Injection;  Surgeon: Jason Caldwell MD;  Location: [facility];  Service: Neurosurgery;  Laterality: Right;  Procedure: Right SI Joint Block and Steroid Injection  Surgery Time: 30 MIN  LOS:  0  Anesthesia: MAC  Radiology: C-arm  Bed: [place] [id number] Poster  Position: Prone    INJECTION OF STEROID Right 9/19/2019    Procedure: INJECTION, STEROID Procedure: Right SI joint block nd steroid injection;  Surgeon: Jason Caldwell MD;  Location: [facility];  Service: Neurosurgery;  Laterality: Right;  Procedure: Right SI joint block nd steroid injection  Surgery Time: 30 mins  LOS:   Anesthesia: General MAC  Radiology:C-arm  Bed: Regular Bed  Position: Prone    JOINT REPLACEMENT      KNEE SURGERY      involving arthroscopic surgery to both knees    SINUS SURGERY      SINUS SURGERY      left molar and sinus surgery for trigeminal neuralgia    SPINAL FUSION  6/22/2015    L3-L5 XLIF    SPINE SURGERY  6/22/15    XLIF/TANA    TOTAL HIP ARTHROPLASTY  4/2012    Pt states he had total hip replacement on his left hip.       Anesthesia Evaluation    I have reviewed the Patient Summary Reports.    I have reviewed the Nursing Notes. I have reviewed the NPO Status.   I have reviewed the Medications.     Review of Systems  Anesthesia Hx:  No problems with previous Anesthesia  Denies Family Hx of Anesthesia complications.    Social:  Non-Smoker, Social Alcohol Use    Hematology/Oncology:  Hematology Normal        Cardiovascular:  Cardiovascular Normal Exercise tolerance: good   Denies Angina.        Pulmonary:   Sleep Apnea, CPAP    Renal/:  Renal/ Normal     Hepatic/GI:   GERD    Musculoskeletal:   Arthritis   Spine Disorders: lumbar Disc disease    Neurological:   Headaches    Endocrine:  Endocrine Normal    Psych:   depression          Physical Exam  General:  Well nourished    Airway/Jaw/Neck:  Airway Findings: Mouth Opening: Normal Tongue: Normal  General Airway Assessment: Adult  Mallampati: II  TM Distance: Normal, at least 6 cm       Chest/Lungs:  Chest/Lungs Findings: Clear to auscultation, Normal Respiratory Rate     Heart/Vascular:  Heart Findings: Rate: Normal  Rhythm: Regular Rhythm  Sounds: Normal         Mental Status:  Mental Status Findings:  Cooperative, Alert and Oriented       EKG 8/3/2020  Sinus bradycardia   Left axis deviation   Abnormal ECG   When compared with ECG of 17-DEC-2018 07:42,   QT has shortened       Anesthesia Plan  Type of Anesthesia, risks & benefits discussed:  Anesthesia Type:  general  Patient's Preference:   Intra-op Monitoring Plan: standard ASA monitors  Intra-op Monitoring Plan Comments:   Post Op Pain Control Plan: multimodal analgesia  Post Op Pain Control Plan Comments:   Induction:   IV  Beta Blocker:  Patient is not currently on a Beta-Blocker (No further documentation required).       Informed Consent: Patient understands risks and agrees with Anesthesia plan.  Questions answered. Anesthesia consent signed with patient.  ASA Score: 2     Day of Surgery Review of History & Physical:            Ready For Surgery From Anesthesia Perspective.

## 2020-08-20 ENCOUNTER — HOSPITAL ENCOUNTER (INPATIENT)
Facility: HOSPITAL | Age: 70
LOS: 2 days | Discharge: HOME | DRG: 460 | End: 2020-08-22
Attending: NEUROLOGICAL SURGERY | Admitting: NEUROLOGICAL SURGERY
Payer: COMMERCIAL

## 2020-08-20 DIAGNOSIS — Z01.818 PREOPERATIVE TESTING: ICD-10-CM

## 2020-08-20 DIAGNOSIS — M51.37 DDD (DEGENERATIVE DISC DISEASE), LUMBOSACRAL: Primary | ICD-10-CM

## 2020-08-20 DIAGNOSIS — M43.27 FUSION OF SPINE, LUMBOSACRAL REGION: ICD-10-CM

## 2020-08-20 DIAGNOSIS — M51.37 DDD (DEGENERATIVE DISC DISEASE), LUMBOSACRAL: ICD-10-CM

## 2020-08-20 DIAGNOSIS — M43.17 SPONDYLOLISTHESIS AT L5-S1 LEVEL: Primary | ICD-10-CM

## 2020-08-20 PROCEDURE — 36000710: Performed by: NEUROLOGICAL SURGERY

## 2020-08-20 PROCEDURE — 22853 INSJ BIOMECHANICAL DEVICE: CPT | Mod: ,,, | Performed by: NEUROLOGICAL SURGERY

## 2020-08-20 PROCEDURE — 22558 PR ARTHRODESIS ANT INTERBODY MIN DISCECTOMY,LUMBAR: ICD-10-PCS | Mod: ,,, | Performed by: NEUROLOGICAL SURGERY

## 2020-08-20 PROCEDURE — 25000003 PHARM REV CODE 250: Performed by: NEUROLOGICAL SURGERY

## 2020-08-20 PROCEDURE — 37000009 HC ANESTHESIA EA ADD 15 MINS: Performed by: NEUROLOGICAL SURGERY

## 2020-08-20 PROCEDURE — C1713 ANCHOR/SCREW BN/BN,TIS/BN: HCPCS | Performed by: NEUROLOGICAL SURGERY

## 2020-08-20 PROCEDURE — 36000711: Performed by: NEUROLOGICAL SURGERY

## 2020-08-20 PROCEDURE — 20930 SP BONE ALGRFT MORSEL ADD-ON: CPT | Mod: ,,, | Performed by: NEUROLOGICAL SURGERY

## 2020-08-20 PROCEDURE — 22853 PR INSERT BIOMECH DEV W/INTERBODY ARTHRODESIS, EA CONTIGUOUS DEFECT: ICD-10-PCS | Mod: ,,, | Performed by: NEUROLOGICAL SURGERY

## 2020-08-20 PROCEDURE — 63600175 PHARM REV CODE 636 W HCPCS: Performed by: NURSE PRACTITIONER

## 2020-08-20 PROCEDURE — 63600175 PHARM REV CODE 636 W HCPCS: Performed by: NURSE ANESTHETIST, CERTIFIED REGISTERED

## 2020-08-20 PROCEDURE — 63600175 PHARM REV CODE 636 W HCPCS: Performed by: NEUROLOGICAL SURGERY

## 2020-08-20 PROCEDURE — 97161 PT EVAL LOW COMPLEX 20 MIN: CPT

## 2020-08-20 PROCEDURE — 27800903 OPTIME MED/SURG SUP & DEVICES OTHER IMPLANTS: Performed by: NEUROLOGICAL SURGERY

## 2020-08-20 PROCEDURE — 37000008 HC ANESTHESIA 1ST 15 MINUTES: Performed by: NEUROLOGICAL SURGERY

## 2020-08-20 PROCEDURE — 97116 GAIT TRAINING THERAPY: CPT

## 2020-08-20 PROCEDURE — 71000039 HC RECOVERY, EACH ADD'L HOUR: Performed by: NEUROLOGICAL SURGERY

## 2020-08-20 PROCEDURE — 97165 OT EVAL LOW COMPLEX 30 MIN: CPT

## 2020-08-20 PROCEDURE — C1889 IMPLANT/INSERT DEVICE, NOC: HCPCS | Performed by: NEUROLOGICAL SURGERY

## 2020-08-20 PROCEDURE — 22558 ARTHRD ANT NTRBD MIN DSC LUM: CPT | Mod: ,,, | Performed by: NEUROLOGICAL SURGERY

## 2020-08-20 PROCEDURE — 25000003 PHARM REV CODE 250: Performed by: NURSE ANESTHETIST, CERTIFIED REGISTERED

## 2020-08-20 PROCEDURE — 63600175 PHARM REV CODE 636 W HCPCS: Performed by: ANESTHESIOLOGY

## 2020-08-20 PROCEDURE — 94761 N-INVAS EAR/PLS OXIMETRY MLT: CPT

## 2020-08-20 PROCEDURE — 11000001 HC ACUTE MED/SURG PRIVATE ROOM

## 2020-08-20 PROCEDURE — 27201423 OPTIME MED/SURG SUP & DEVICES STERILE SUPPLY: Performed by: NEUROLOGICAL SURGERY

## 2020-08-20 PROCEDURE — 94799 UNLISTED PULMONARY SVC/PX: CPT

## 2020-08-20 PROCEDURE — 71000033 HC RECOVERY, INTIAL HOUR: Performed by: NEUROLOGICAL SURGERY

## 2020-08-20 PROCEDURE — 20930 PR ALLOGRAFT FOR SPINE SURGERY ONLY MORSELIZED: ICD-10-PCS | Mod: ,,, | Performed by: NEUROLOGICAL SURGERY

## 2020-08-20 DEVICE — IMPLANTABLE DEVICE: Type: IMPLANTABLE DEVICE | Site: BACK | Status: FUNCTIONAL

## 2020-08-20 DEVICE — KIT BONE GRFT BMP SM: Type: IMPLANTABLE DEVICE | Site: BACK | Status: FUNCTIONAL

## 2020-08-20 DEVICE — ALLOGRAFT TRIFECTA 1CC: Type: IMPLANTABLE DEVICE | Site: BACK | Status: FUNCTIONAL

## 2020-08-20 RX ORDER — DEXTROSE MONOHYDRATE, SODIUM CHLORIDE, AND POTASSIUM CHLORIDE 50; 1.49; 9 G/1000ML; G/1000ML; G/1000ML
INJECTION, SOLUTION INTRAVENOUS CONTINUOUS
Status: DISCONTINUED | OUTPATIENT
Start: 2020-08-20 | End: 2020-08-21

## 2020-08-20 RX ORDER — SODIUM CHLORIDE, SODIUM LACTATE, POTASSIUM CHLORIDE, CALCIUM CHLORIDE 600; 310; 30; 20 MG/100ML; MG/100ML; MG/100ML; MG/100ML
INJECTION, SOLUTION INTRAVENOUS CONTINUOUS PRN
Status: DISCONTINUED | OUTPATIENT
Start: 2020-08-20 | End: 2020-08-20

## 2020-08-20 RX ORDER — LAMOTRIGINE 100 MG/1
200 TABLET ORAL DAILY
Status: DISCONTINUED | OUTPATIENT
Start: 2020-08-20 | End: 2020-08-22 | Stop reason: HOSPADM

## 2020-08-20 RX ORDER — PROPOFOL 10 MG/ML
VIAL (ML) INTRAVENOUS CONTINUOUS PRN
Status: DISCONTINUED | OUTPATIENT
Start: 2020-08-20 | End: 2020-08-20

## 2020-08-20 RX ORDER — PANTOPRAZOLE SODIUM 40 MG/1
40 TABLET, DELAYED RELEASE ORAL DAILY
Status: DISCONTINUED | OUTPATIENT
Start: 2020-08-20 | End: 2020-08-22 | Stop reason: HOSPADM

## 2020-08-20 RX ORDER — VANCOMYCIN HYDROCHLORIDE 1 G/20ML
INJECTION, POWDER, LYOPHILIZED, FOR SOLUTION INTRAVENOUS
Status: DISCONTINUED | OUTPATIENT
Start: 2020-08-20 | End: 2020-08-20 | Stop reason: HOSPADM

## 2020-08-20 RX ORDER — LIDOCAINE HCL/PF 100 MG/5ML
SYRINGE (ML) INTRAVENOUS
Status: DISCONTINUED | OUTPATIENT
Start: 2020-08-20 | End: 2020-08-20

## 2020-08-20 RX ORDER — PHENYLEPHRINE HYDROCHLORIDE 10 MG/ML
INJECTION INTRAVENOUS
Status: DISCONTINUED | OUTPATIENT
Start: 2020-08-20 | End: 2020-08-20

## 2020-08-20 RX ORDER — CELECOXIB 100 MG/1
200 CAPSULE ORAL 2 TIMES DAILY
Status: DISCONTINUED | OUTPATIENT
Start: 2020-08-20 | End: 2020-08-22 | Stop reason: HOSPADM

## 2020-08-20 RX ORDER — ACETAMINOPHEN 325 MG/1
650 TABLET ORAL
Status: DISCONTINUED | OUTPATIENT
Start: 2020-08-20 | End: 2020-08-20

## 2020-08-20 RX ORDER — MUPIROCIN 20 MG/G
OINTMENT TOPICAL 2 TIMES DAILY
Status: DISCONTINUED | OUTPATIENT
Start: 2020-08-20 | End: 2020-08-22 | Stop reason: HOSPADM

## 2020-08-20 RX ORDER — CEFAZOLIN SODIUM 2 G/50ML
2 SOLUTION INTRAVENOUS ONCE
Status: COMPLETED | OUTPATIENT
Start: 2020-08-20 | End: 2020-08-20

## 2020-08-20 RX ORDER — DEXAMETHASONE SODIUM PHOSPHATE 4 MG/ML
INJECTION, SOLUTION INTRA-ARTICULAR; INTRALESIONAL; INTRAMUSCULAR; INTRAVENOUS; SOFT TISSUE
Status: DISCONTINUED | OUTPATIENT
Start: 2020-08-20 | End: 2020-08-20

## 2020-08-20 RX ORDER — PREGABALIN 50 MG/1
50 CAPSULE ORAL 4 TIMES DAILY
Status: DISCONTINUED | OUTPATIENT
Start: 2020-08-20 | End: 2020-08-21

## 2020-08-20 RX ORDER — PREGABALIN 50 MG/1
100 CAPSULE ORAL NIGHTLY
Status: DISCONTINUED | OUTPATIENT
Start: 2020-08-20 | End: 2020-08-21

## 2020-08-20 RX ORDER — TRAMADOL HYDROCHLORIDE 50 MG/1
100 TABLET ORAL EVERY 6 HOURS
Status: CANCELLED | OUTPATIENT
Start: 2020-08-20

## 2020-08-20 RX ORDER — ONDANSETRON 2 MG/ML
4 INJECTION INTRAMUSCULAR; INTRAVENOUS DAILY PRN
Status: DISCONTINUED | OUTPATIENT
Start: 2020-08-20 | End: 2020-08-20 | Stop reason: HOSPADM

## 2020-08-20 RX ORDER — ONDANSETRON HYDROCHLORIDE 2 MG/ML
INJECTION, SOLUTION INTRAMUSCULAR; INTRAVENOUS
Status: DISCONTINUED | OUTPATIENT
Start: 2020-08-20 | End: 2020-08-20

## 2020-08-20 RX ORDER — FENTANYL CITRATE 50 UG/ML
INJECTION, SOLUTION INTRAMUSCULAR; INTRAVENOUS
Status: DISCONTINUED | OUTPATIENT
Start: 2020-08-20 | End: 2020-08-20

## 2020-08-20 RX ORDER — LIDOCAINE HYDROCHLORIDE 10 MG/ML
1 INJECTION, SOLUTION EPIDURAL; INFILTRATION; INTRACAUDAL; PERINEURAL ONCE
Status: DISCONTINUED | OUTPATIENT
Start: 2020-08-20 | End: 2020-08-20 | Stop reason: HOSPADM

## 2020-08-20 RX ORDER — AMOXICILLIN 250 MG
2 CAPSULE ORAL NIGHTLY PRN
Status: DISCONTINUED | OUTPATIENT
Start: 2020-08-20 | End: 2020-08-22 | Stop reason: HOSPADM

## 2020-08-20 RX ORDER — CELECOXIB 100 MG/1
200 CAPSULE ORAL
Status: DISCONTINUED | OUTPATIENT
Start: 2020-08-20 | End: 2020-08-20

## 2020-08-20 RX ORDER — BISACODYL 10 MG
10 SUPPOSITORY, RECTAL RECTAL DAILY
Status: DISCONTINUED | OUTPATIENT
Start: 2020-08-20 | End: 2020-08-22 | Stop reason: HOSPADM

## 2020-08-20 RX ORDER — HEPARIN SODIUM 5000 [USP'U]/ML
5000 INJECTION, SOLUTION INTRAVENOUS; SUBCUTANEOUS EVERY 12 HOURS
Status: DISCONTINUED | OUTPATIENT
Start: 2020-08-20 | End: 2020-08-22 | Stop reason: HOSPADM

## 2020-08-20 RX ORDER — PREGABALIN 75 MG/1
75 CAPSULE ORAL
Status: DISCONTINUED | OUTPATIENT
Start: 2020-08-20 | End: 2020-08-20

## 2020-08-20 RX ORDER — ONDANSETRON 8 MG/1
8 TABLET, ORALLY DISINTEGRATING ORAL EVERY 6 HOURS PRN
Status: DISCONTINUED | OUTPATIENT
Start: 2020-08-20 | End: 2020-08-22 | Stop reason: HOSPADM

## 2020-08-20 RX ORDER — GLYCOPYRROLATE 0.2 MG/ML
INJECTION INTRAMUSCULAR; INTRAVENOUS
Status: DISCONTINUED | OUTPATIENT
Start: 2020-08-20 | End: 2020-08-20

## 2020-08-20 RX ORDER — ACETAMINOPHEN 325 MG/1
650 TABLET ORAL EVERY 6 HOURS
Status: DISCONTINUED | OUTPATIENT
Start: 2020-08-20 | End: 2020-08-22 | Stop reason: HOSPADM

## 2020-08-20 RX ORDER — PREGABALIN 50 MG/1
50 CAPSULE ORAL 2 TIMES DAILY
Status: DISCONTINUED | OUTPATIENT
Start: 2020-08-20 | End: 2020-08-20

## 2020-08-20 RX ORDER — METHOCARBAMOL 750 MG/1
750 TABLET, FILM COATED ORAL 3 TIMES DAILY
Status: DISCONTINUED | OUTPATIENT
Start: 2020-08-20 | End: 2020-08-22 | Stop reason: HOSPADM

## 2020-08-20 RX ORDER — SODIUM CHLORIDE, SODIUM LACTATE, POTASSIUM CHLORIDE, CALCIUM CHLORIDE 600; 310; 30; 20 MG/100ML; MG/100ML; MG/100ML; MG/100ML
INJECTION, SOLUTION INTRAVENOUS CONTINUOUS
Status: DISCONTINUED | OUTPATIENT
Start: 2020-08-20 | End: 2020-08-20

## 2020-08-20 RX ORDER — BACITRACIN 50000 [IU]/1
INJECTION, POWDER, FOR SOLUTION INTRAMUSCULAR
Status: DISCONTINUED | OUTPATIENT
Start: 2020-08-20 | End: 2020-08-20 | Stop reason: HOSPADM

## 2020-08-20 RX ORDER — SUCCINYLCHOLINE CHLORIDE 20 MG/ML
INJECTION INTRAMUSCULAR; INTRAVENOUS
Status: DISCONTINUED | OUTPATIENT
Start: 2020-08-20 | End: 2020-08-20

## 2020-08-20 RX ORDER — HYDROMORPHONE HYDROCHLORIDE 2 MG/ML
0.5 INJECTION, SOLUTION INTRAMUSCULAR; INTRAVENOUS; SUBCUTANEOUS EVERY 5 MIN PRN
Status: DISCONTINUED | OUTPATIENT
Start: 2020-08-20 | End: 2020-08-20 | Stop reason: HOSPADM

## 2020-08-20 RX ORDER — OXYCODONE HCL 10 MG/1
10 TABLET, FILM COATED, EXTENDED RELEASE ORAL
Status: DISCONTINUED | OUTPATIENT
Start: 2020-08-20 | End: 2020-08-20

## 2020-08-20 RX ORDER — CYCLOBENZAPRINE HCL 10 MG
10 TABLET ORAL
Status: DISCONTINUED | OUTPATIENT
Start: 2020-08-20 | End: 2020-08-20

## 2020-08-20 RX ORDER — HYDROMORPHONE HYDROCHLORIDE 2 MG/ML
2 INJECTION, SOLUTION INTRAMUSCULAR; INTRAVENOUS; SUBCUTANEOUS
Status: DISCONTINUED | OUTPATIENT
Start: 2020-08-20 | End: 2020-08-22 | Stop reason: HOSPADM

## 2020-08-20 RX ORDER — BUPIVACAINE HCL/EPINEPHRINE 0.5-1:200K
VIAL (ML) INJECTION
Status: DISCONTINUED | OUTPATIENT
Start: 2020-08-20 | End: 2020-08-20 | Stop reason: HOSPADM

## 2020-08-20 RX ORDER — MAG HYDROX/ALUMINUM HYD/SIMETH 200-200-20
30 SUSPENSION, ORAL (FINAL DOSE FORM) ORAL EVERY 4 HOURS PRN
Status: DISCONTINUED | OUTPATIENT
Start: 2020-08-20 | End: 2020-08-22 | Stop reason: HOSPADM

## 2020-08-20 RX ORDER — PROPOFOL 10 MG/ML
VIAL (ML) INTRAVENOUS
Status: DISCONTINUED | OUTPATIENT
Start: 2020-08-20 | End: 2020-08-20

## 2020-08-20 RX ORDER — ROCURONIUM BROMIDE 10 MG/ML
INJECTION, SOLUTION INTRAVENOUS
Status: DISCONTINUED | OUTPATIENT
Start: 2020-08-20 | End: 2020-08-20

## 2020-08-20 RX ORDER — MIDAZOLAM HYDROCHLORIDE 1 MG/ML
INJECTION INTRAMUSCULAR; INTRAVENOUS
Status: DISCONTINUED | OUTPATIENT
Start: 2020-08-20 | End: 2020-08-20

## 2020-08-20 RX ORDER — TRAZODONE HYDROCHLORIDE 100 MG/1
100 TABLET ORAL NIGHTLY
Status: DISCONTINUED | OUTPATIENT
Start: 2020-08-20 | End: 2020-08-22 | Stop reason: HOSPADM

## 2020-08-20 RX ADMIN — ACETAMINOPHEN 650 MG: 325 TABLET ORAL at 06:08

## 2020-08-20 RX ADMIN — MIDAZOLAM HYDROCHLORIDE 2 MG: 1 INJECTION, SOLUTION INTRAMUSCULAR; INTRAVENOUS at 08:08

## 2020-08-20 RX ADMIN — SODIUM CHLORIDE, SODIUM LACTATE, POTASSIUM CHLORIDE, AND CALCIUM CHLORIDE: .6; .31; .03; .02 INJECTION, SOLUTION INTRAVENOUS at 08:08

## 2020-08-20 RX ADMIN — KETAMINE HYDROCHLORIDE 5 MCG/KG/MIN: 50 INJECTION, SOLUTION, CONCENTRATE INTRAMUSCULAR; INTRAVENOUS at 08:08

## 2020-08-20 RX ADMIN — LIDOCAINE HYDROCHLORIDE 80 MG: 20 INJECTION, SOLUTION INTRAVENOUS at 08:08

## 2020-08-20 RX ADMIN — DEXAMETHASONE SODIUM PHOSPHATE 8 MG: 4 INJECTION, SOLUTION INTRA-ARTICULAR; INTRALESIONAL; INTRAMUSCULAR; INTRAVENOUS; SOFT TISSUE at 08:08

## 2020-08-20 RX ADMIN — TRAZODONE HYDROCHLORIDE 100 MG: 100 TABLET ORAL at 09:08

## 2020-08-20 RX ADMIN — PHENYLEPHRINE HYDROCHLORIDE 100 MCG: 10 INJECTION INTRAVENOUS at 08:08

## 2020-08-20 RX ADMIN — GLYCOPYRROLATE 0.1 MG: 0.2 INJECTION, SOLUTION INTRAMUSCULAR; INTRAVENOUS at 08:08

## 2020-08-20 RX ADMIN — HEPARIN SODIUM 5000 UNITS: 5000 INJECTION INTRAVENOUS; SUBCUTANEOUS at 01:08

## 2020-08-20 RX ADMIN — PROPOFOL 150 MG: 10 INJECTION, EMULSION INTRAVENOUS at 08:08

## 2020-08-20 RX ADMIN — MUPIROCIN: 20 OINTMENT TOPICAL at 01:08

## 2020-08-20 RX ADMIN — ACETAMINOPHEN 650 MG: 325 TABLET ORAL at 01:08

## 2020-08-20 RX ADMIN — CELECOXIB 200 MG: 100 CAPSULE ORAL at 09:08

## 2020-08-20 RX ADMIN — HYDROMORPHONE HYDROCHLORIDE 2 MG: 2 INJECTION, SOLUTION INTRAMUSCULAR; INTRAVENOUS; SUBCUTANEOUS at 10:08

## 2020-08-20 RX ADMIN — ROCURONIUM BROMIDE 5 MG: 10 INJECTION, SOLUTION INTRAVENOUS at 08:08

## 2020-08-20 RX ADMIN — HYDROMORPHONE HYDROCHLORIDE 2 MG: 2 INJECTION, SOLUTION INTRAMUSCULAR; INTRAVENOUS; SUBCUTANEOUS at 02:08

## 2020-08-20 RX ADMIN — PROPOFOL 150 MCG/KG/MIN: 10 INJECTION, EMULSION INTRAVENOUS at 08:08

## 2020-08-20 RX ADMIN — CEFAZOLIN SODIUM 2 G: 2 SOLUTION INTRAVENOUS at 08:08

## 2020-08-20 RX ADMIN — ONDANSETRON 8 MG: 2 INJECTION, SOLUTION INTRAMUSCULAR; INTRAVENOUS at 09:08

## 2020-08-20 RX ADMIN — PREGABALIN 50 MG: 50 CAPSULE ORAL at 09:08

## 2020-08-20 RX ADMIN — PANTOPRAZOLE SODIUM 40 MG: 40 TABLET, DELAYED RELEASE ORAL at 01:08

## 2020-08-20 RX ADMIN — METHOCARBAMOL TABLETS 750 MG: 750 TABLET, COATED ORAL at 09:08

## 2020-08-20 RX ADMIN — PREGABALIN 100 MG: 50 CAPSULE ORAL at 09:08

## 2020-08-20 RX ADMIN — DEXTROSE MONOHYDRATE, SODIUM CHLORIDE, AND POTASSIUM CHLORIDE: 50; 9; 1.49 INJECTION, SOLUTION INTRAVENOUS at 01:08

## 2020-08-20 RX ADMIN — HYDROMORPHONE HYDROCHLORIDE 2 MG: 2 INJECTION, SOLUTION INTRAMUSCULAR; INTRAVENOUS; SUBCUTANEOUS at 06:08

## 2020-08-20 RX ADMIN — HYDROMORPHONE HYDROCHLORIDE 0.5 MG: 2 INJECTION, SOLUTION INTRAMUSCULAR; INTRAVENOUS; SUBCUTANEOUS at 10:08

## 2020-08-20 RX ADMIN — SUCCINYLCHOLINE CHLORIDE 140 MG: 20 INJECTION, SOLUTION INTRAMUSCULAR; INTRAVENOUS at 08:08

## 2020-08-20 RX ADMIN — MUPIROCIN: 20 OINTMENT TOPICAL at 09:08

## 2020-08-20 RX ADMIN — DEXTROSE MONOHYDRATE, SODIUM CHLORIDE, AND POTASSIUM CHLORIDE: 50; 9; 1.49 INJECTION, SOLUTION INTRAVENOUS at 09:08

## 2020-08-20 RX ADMIN — HEPARIN SODIUM 5000 UNITS: 5000 INJECTION INTRAVENOUS; SUBCUTANEOUS at 09:08

## 2020-08-20 RX ADMIN — FENTANYL CITRATE 50 MCG: 50 INJECTION, SOLUTION INTRAMUSCULAR; INTRAVENOUS at 08:08

## 2020-08-20 RX ADMIN — SODIUM CHLORIDE, SODIUM LACTATE, POTASSIUM CHLORIDE, AND CALCIUM CHLORIDE: .6; .31; .03; .02 INJECTION, SOLUTION INTRAVENOUS at 07:08

## 2020-08-20 RX ADMIN — METHOCARBAMOL TABLETS 750 MG: 750 TABLET, COATED ORAL at 05:08

## 2020-08-20 RX ADMIN — PREGABALIN 50 MG: 50 CAPSULE ORAL at 01:08

## 2020-08-20 NOTE — ANESTHESIA POSTPROCEDURE EVALUATION
Anesthesia Post Evaluation    Patient: Raffy Rutherford Jr.    Procedure(s) Performed: Procedure(s) (LRB):  FUSION, SPINE, LUMBAR, ANTERIOR APPROACH L5-S1 ALIF Stand Alone (N/A)    Final Anesthesia Type: general    Patient location during evaluation: PACU  Patient participation: Yes- Able to Participate  Level of consciousness: awake and alert and oriented  Post-procedure vital signs: reviewed and stable  Pain management: adequate  Airway patency: patent    PONV status at discharge: No PONV  Anesthetic complications: no      Cardiovascular status: blood pressure returned to baseline and hemodynamically stable  Respiratory status: unassisted and spontaneous ventilation  Hydration status: euvolemic  Follow-up not needed.          Vitals Value Taken Time   /71 08/20/20 1218   Temp 36.4 °C (97.6 °F) 08/20/20 1218   Pulse 57 08/20/20 1353   Resp 16 08/20/20 1353   SpO2 98 % 08/20/20 1353         Event Time   Out of Recovery 11:49:00         Pain/Tequila Score: Pain Rating Prior to Med Admin: 4 (8/20/2020  1:16 PM)  Pain Rating Post Med Admin: 4 (8/20/2020 11:15 AM)  Tequila Score: 9 (8/20/2020 11:15 AM)

## 2020-08-20 NOTE — OP NOTE
DATE OF PROCEDURE: 08/20/2020     PREOPERATIVE DIAGNOSES:  1.  L5-S1 degenerative disc disease  2.   L5-S1 spondylolisthesis  3.  Status post L5-S1 microdiskectomy  4.  Adjacent segment degeneration with stenosis     POSTOPERATIVE DIAGNOSES:  1.  L5-S1 degenerative disc disease  2.   L5-S1 spondylolisthesis  3.  Status post L5-S1 microdiskectomy  4.  Adjacent segment degeneration with stenosis      PROCEDURES:  1. Left anterior to the psoas minimally invasive access to the lumbar spine  2. L5-S1 anterior interbody fusion with placement of a Globus Madison static cage [id number] mm 15 degrees lordotic.  3. Intraoperative neuromonitoring  4. Fluoroscopy      PRIMARY SURGEON: Jason Caldwell M.D.      ASSISTANT SURGEON:  Nickolas Gillis MD (RES)     INDICATIONS: This is a 70-year-old  male, status post L3-5 direct lateral interbody fusion and posterior instrumentation, L5-S1 degenerative disc disease, status post L5-S1 microdiskectomy, chronic instability with development of L5-S1 spondylolisthesis and severe bilateral foraminal stenosis with bilateral footdrop.  Risks included paralysis, leg pain, low back pain, CSF leak, ureter injury, great vessels injury, screw malpositioning, hardware failure or migration, reoperation, medical complications such as MI, pneumonia and death. The patient consented for surgery.         DESCRIPTION OF THE PROCEDURE:   The patient was intubated under general anesthesia. She was placed in lateral decubitus right side down on the sliding table. All the pressure points were carefully padded. Fluoroscopic localization was then utilized to identify the L5-S1 levels.     After injection local anesthesia with Lidocaine with epi, and incision was made with a #15 blade at L5-S1 4 finger breaths anterior to the ASIS. The subcutaneous tissue was then dissected bluntly. Hemostasis was carried out with the bipolar. The external oblique, internal oblique and transversalis were then dissected  bluntly. We felt the iliac artery passing laterally.      Serial dilators were then passed through the initial probe and a final retractor was placed at L5-S1 on the midline. Under direct look and using the operative microscope, the disk annulus was opened and diskectomy was carried out using rongeurs. A Melgar elevator was then inserted in the disk space. The discectomy was completed using serial matteo and the endplates were scrapped. We were able to insert in the L5-S1 disc space a small ALIF trial. Once the endplates were cleaned from cartilage and decorticated we placed a static 15 degrees lordotic 13 mm x 24 x 30 mm cage filled with allograft Trifecta and BMP.  The cage was fixated with integral instrumentation with 125 mm screws at L5 and 225 mm screws at S1.  The locking mechanism was engaged.  Abundant irrigation and hemostasis.     The retroperitoneal space was closed by closing the external oblique fascia with 0 Vicryl. The subcutaneous layer was closed with 3-0 Vicryl inverted suture. The skin was closed with staples . The wounds were dressed.      The blood loss was 5 mL. The final count was completed and nothing was missing. There was no complication. The patient tolerated well the procedure.

## 2020-08-20 NOTE — PT/OT/SLP EVAL
Physical Therapy Evaluation    Patient Name:  Raffy Rutherford Jr.   MRN:  [id number]    Recommendations:     Discharge Recommendations:  outpatient PT (once cleared by neurosurgery)   Discharge Equipment Recommendations: (TBD)   Barriers to discharge: None    Assessment:     Raffy Rutherford Jr. is a 70 y.o. male admitted with a medical diagnosis of Spondylolisthesis at L5-S1 level.  He presents with the following impairments/functional limitations:  weakness, impaired endurance, impaired self care skills, impaired functional mobilty, gait instability, impaired balance, visual deficits, decreased coordination, decreased upper extremity function, decreased lower extremity function, decreased safety awareness, pain, decreased ROM, impaired skin, orthopedic precautions. Pt ambulated 170 ft with RW and CGA to SBA, presenting with increased R hip flexion to clear R foot. Pt presented with poor visual scanning to R, running into objects in the salazar.    Rehab Prognosis: Good; patient would benefit from acute skilled PT services to address these deficits and reach maximum level of function.    Recent Surgery: Procedure(s) (LRB):  FUSION, SPINE, LUMBAR, ANTERIOR APPROACH L5-S1 ALIF Stand Alone (N/A) Day of Surgery    Plan:     During this hospitalization, patient to be seen daily to address the identified rehab impairments via gait training, therapeutic activities, therapeutic exercises, neuromuscular re-education and progress toward the following goals:    Plan of Care Expires:  09/20/20    Subjective     Chief Complaint: None  Patient/Family Comments/goals: Pt pleasant and compliant to PT/OT  Pain/Comfort:  Pain Rating 1: (not rated)    Patients cultural, spiritual, Amish conflicts given the current situation: no    Living Environment:  Pt lives with wife in 2SH with no [place], but has 1 flight of stairs with B rails to get to his home-office. Pt's bed/bath downstairs with WIS, grab bars, and shower chair.  Prior to admission,  "patients level of function was independent without AD within his house and use of B walking sticks with outdoor ambulation. Pt also has B AFO he wears outside the home.  Equipment used at home: cane, straight, walker, rolling, shower chair, other (see comments), grab bar("walking sticks").  DME owned (not currently used): rolling walker and shower chair.  Upon discharge, patient will have assistance from wife.    Objective:     Communicated with nurse Lisandra prior to session.  Patient found HOB elevated with bed alarm, peripheral IV  upon PT entry to room.    General Precautions: Standard, fall   Orthopedic Precautions:spinal precautions   Braces: LSO     Exams:  Cognitive Exam:  Patient is oriented to Person, Place, Time and Situation  Gross Motor Coordination:  WFL  Postural Exam:  Patient presented with the following abnormalities:    -       No postural abnormalities identified  Sensation:    -       Intact  Skin Integrity/Edema:      -       Skin integrity: Visible skin intact  RLE ROM: WFL except DF lacking 5-10 deg  RLE Strength: WFL except DF (3/5)  LLE ROM: WFL except DF unable to get past neutral  LLE Strength: Deficits: knee extension hip flexion both (3-/5) limited by pain    Functional Mobility:  Bed Mobility:     Rolling Right: stand by assistance  Supine to Sit: minimum assistance and for trunk support  Transfers:     Sit to Stand:  contact guard assistance with rolling walker  Gait: Pt ambulated 170 ft with RW and CGA to SBA, presenting with increased R hip flexion to clear R foot and completing heel strike B. Pt reporting feeling he is flexing R hip less than prior to surgery, feels improved. Pt presents with poor visual scanning on R, running into objects in hallway.     Therapeutic Activities and Exercises:  Pt transferred to seated EOB for eval.  Pt performed standing marches x10  Pt ambulated 170 ft with RW and CGA to SBA as noted above.  Pt left seated EOB with orthotist.    AM-PAC 6 CLICK " MOBILITY  Total Score:16     Patient left  sitting EOB  with all lines intact, call button in reach, nurse notified and orthotist present.    GOALS:   Multidisciplinary Problems       Physical Therapy Goals          Problem: Physical Therapy Goal    Goal Priority Disciplines Outcome Goal Variances Interventions   Physical Therapy Goal     PT, PT/OT Ongoing, Progressing     Description: Goals to be met by: 20     Patient will increase functional independence with mobility by performin. Supine to sit with Modified [place]  2. Sit to supine with Modified [place]  3. Gait  x 200 feet with Modified [place] using least restrictive device.   4. Lower extremity exercise program x10 reps per handout, with independence                         History:     Past Medical History:   Diagnosis Date    Adenomatous polyp     Adenomatous polyp     Allergy     Arthritis     Back pain     after trauma beginning in 195    Cataract     Chronic pain     neck and left shoulder    Cluster headache     Colon polyp     Degenerative disc disease     Depression     Diverticulitis 2013    Elevated PSA     Fibromyalgia     GERD (gastroesophageal reflux disease)     Hepatitis 1970s    A    History of prostate biopsy     Hyperlipidemia     Joint pain     Sleep apnea     Special screening for malignant neoplasms, colon 2014    Thyroid nodule 2014    Visual disturbance     problems after cataract surgery       Past Surgical History:   Procedure Laterality Date    BACK SURGERY      CATARACT EXTRACTION W/  INTRAOCULAR LENS IMPLANT Bilateral     CHOLECYSTECTOMY      COLONOSCOPY N/A 2019    Procedure: COLONOSCOPY;  Surgeon: Amadou Hardin MD;  Location: [facility] ([address] [place] [address]);  Service: Endoscopy;  Laterality: N/A;  pt request to do Miralax bowel prep-BB    COSMETIC SURGERY  2/10/2015    Direct mid-forehead brow lift    COSMETIC SURGERY  2/10/2015    Bilateral upper lid blepahroplasty     CYSTOSCOPY WITH URETEROSCOPY, RETROGRADE PYELOGRAPHY, AND INSERTION OF STENT Left 8/19/2020    Procedure: CYSTOSCOPY, WITH RETROGRADE PYELOGRAM AND URETERAL STENT INSERTION;  Surgeon: Katelynn George MD;  Location: [facility];  Service: Urology;  Laterality: Left;    ESOPHAGOGASTRODUODENOSCOPY N/A 12/17/2019    Procedure: ESOPHAGOGASTRODUODENOSCOPY (EGD);  Surgeon: Amadou Hardin MD;  [facility]  Service: Endoscopy;  Laterality: N/A;    EYE SURGERY      HEMORRHOID SURGERY      with complication of chronic bleeding for 6 weeks     INJECTION OF STEROID Right 12/6/2018    Procedure: INJECTION, STEROID Right SI Joint Block and Steroid Injection;  Surgeon: Jason Caldwell MD;  Location: [facility];  Service: Neurosurgery;  Laterality: Right;  Procedure: Right SI Joint Block and Steroid Injection  Surgery Time: 30 MIN  LOS: 0  Anesthesia: MAC  Radiology: C-arm  Bed: [place] [id number] Poster  Position: Prone    INJECTION OF STEROID Right 9/19/2019    Procedure: INJECTION, STEROID Procedure: Right SI joint block nd steroid injection;  Surgeon: Jason Caldwell MD;  Location: [facility];  Service: Neurosurgery;  Laterality: Right;  Procedure: Right SI joint block nd steroid injection  Surgery Time: 30 mins  LOS:   Anesthesia: General MAC  Radiology:C-arm  Bed: Regular Bed  Position: Prone    JOINT REPLACEMENT      KNEE SURGERY      involving arthroscopic surgery to both knees    SINUS SURGERY      SINUS SURGERY      left molar and sinus surgery for trigeminal neuralgia    SPINAL FUSION  6/22/2015    L3-L5 XLIF    SPINE SURGERY  6/22/15    XLIF/TANA    TOTAL HIP ARTHROPLASTY  4/2012    Pt states he had total hip replacement on his left hip.       Time Tracking:     PT Received On: 08/20/20  PT Start Time: 1411     PT Stop Time: 1440  PT Total Time (min): 29 min     Billable Minutes: Evaluation 14 and Gait Training 15      Mariel Carrera, [facility]  08/20/2020

## 2020-08-20 NOTE — PLAN OF CARE
Problem: Occupational Therapy Goal  Goal: Occupational Therapy Goal  Description: Goals to be met by: 9/20/20     Patient will increase functional independence with ADLs by performing:    LE Dressing with Supervision and Assistive Devices as needed.  Grooming while standing with Supervision.  Toileting from toilet with Supervision for hygiene and clothing management.   Supine to sit with Supervision.  Step transfer with Supervision  Toilet transfer to toilet with Supervision.  Increased functional strength to WFL for self care skills and functional mobility .  Upper extremity exercise program x10 reps per handout, with independence.    Outcome: Ongoing, Progressing       Pt would benefit from cont OT services in order to maximize functional independence. Recommending OP PT when cleared by MD.

## 2020-08-20 NOTE — PLAN OF CARE
Problem: Physical Therapy Goal  Goal: Physical Therapy Goal  Description: Goals to be met by: 20     Patient will increase functional independence with mobility by performin. Supine to sit with Modified [place]  2. Sit to supine with Modified [place]  3. Gait  x 200 feet with Modified [place] using least restrictive device.   4. Lower extremity exercise program x10 reps per handout, with independence    Outcome: Ongoing, Progressing    Pt ambulated 170 ft with RW and CGA to SBA, presenting with increased R hip flexion to clear R foot. Pt presented with poor visual scanning to R, running into objects in the salazar.

## 2020-08-20 NOTE — PT/OT/SLP EVAL
Occupational Therapy   Evaluation    Name: Raffy Rutherford Jr.  MRN: [id number]  Admitting Diagnosis:  Spondylolisthesis at L5-S1 level Day of Surgery    Recommendations:     Discharge Recommendations: outpatient PT, outpatient OT  Discharge Equipment Recommendations:  (TBD)  Barriers to discharge:  Inaccessible home environment    Assessment:     Raffy Rutherford Jr. is a 70 y.o. male with a medical diagnosis of Spondylolisthesis at L5-S1 level.  He presents with s/p back surgery; R groin pain . Performance deficits affecting function: weakness, gait instability, impaired endurance, impaired balance, impaired sensation, impaired functional mobilty, impaired self care skills, decreased lower extremity function, decreased ROM, impaired coordination, pain, orthopedic precautions.      Pt would benefit from cont OT services in order to maximize functional independence. Recommending OP PT when cleared by MD.     Rehab Prognosis: Good; patient would benefit from acute skilled OT services to address these deficits and reach maximum level of function.       Plan:     Patient to be seen 5 x/week to address the above listed problems via self-care/home management, therapeutic activities, therapeutic exercises  · Plan of Care Expires: 09/20/20  · Plan of Care Reviewed with: patient, spouse    Subjective     Chief Complaint: pt reports L groin pain; impaired range of B feet; increased weakness    Patient/Family Comments/goals: increase function     Occupational Profile:  Living Environment: with spouse in 2 story house, no steps to enter, bed and WIS downstairs; office upstairs   Previous level of function: pt reports mod I, however, increased difficulty recently 2/2 pain   Equipment Used at Home:  cane, straight, walker, rolling, shower chair  Assistance upon Discharge: from spouse     Pain/Comfort:  · Pain Rating 1: (not rated)  · Location - Side 1: Left  · Location - Orientation 1: generalized  · Location 1: groin  · Pain  Addressed 1: Reposition, Distraction, Cessation of Activity    Patients cultural, spiritual, Sabianism conflicts given the current situation:      Objective:     Communicated with: nscindy prior to session.  General Precautions: Standard, fall   Orthopedic Precautions:spinal precautions   Braces: LSO     Occupational Performance:    Bed Mobility:    · Patient completed Rolling/Turning to Right with minimum assistance  · Patient completed Scooting/Bridging with contact guard assistance  · Patient completed Supine to Sit with minimum assistance      Functional Mobility/Transfers:  · Patient completed Sit <> Stand Transfer with contact guard assistance  with  rolling walker   · Functional Mobility: CGA with RW; cues for step sequencing and gait pattern; pt also required verbal cues for navigation of obstacles as he veers to R and demonstrates impaired peripheral vision on R hitting objects in pathway     Activities of Daily Living:  · Upper Body Dressing: minimum assistance LSO brace  · Lower Body Dressing: moderate assistance      Cognitive/Visual Perceptual:  WFL   Does demonstrated impaired peripheral vision on R based on function during movement     Physical Exam:  Balance:    -       CGA/SBA  Postural examination/scapula alignment:    -       Rounded shoulders  Skin integrity: Wound surgical   Edema:  None noted  Sensation:    -       Intact  Dominant hand:    -       right  Upper Extremity Range of Motion:   WFL BUE for pt's needs based on observed function   Upper Extremity Strength:  BUE WFL for pt's needs based on observed function    Strength:  Good     AMPAC 6 Click ADL:  AMPAC Total Score: 19    Treatment & Education:  Pt educated on spinal precautions prior to axs   Required min A to monisha R sock while long sitting in bed; unable to monisha L sock while seated EOB   Min A monisha LSO brace   Min A stand from EOB with RW   Functional mobility in hallway; pt noted to run into objects on R in path with ambulation    Education:    Patient left seated EOB  with all lines intact, nsg notified and spouse and orthotist  present to fit for new LSO brace     GOALS:   Multidisciplinary Problems     Occupational Therapy Goals        Problem: Occupational Therapy Goal    Goal Priority Disciplines Outcome Interventions   Occupational Therapy Goal     OT, PT/OT Ongoing, Progressing    Description: Goals to be met by: 9/20/20     Patient will increase functional independence with ADLs by performing:    LE Dressing with Supervision and Assistive Devices as needed.  Grooming while standing with Supervision.  Toileting from toilet with Supervision for hygiene and clothing management.   Supine to sit with Supervision.  Step transfer with Supervision  Toilet transfer to toilet with Supervision.  Increased functional strength to WFL for self care skills and functional mobility .  Upper extremity exercise program x10 reps per handout, with independence.                     History:     Past Medical History:   Diagnosis Date    Adenomatous polyp 2003    Adenomatous polyp     Allergy     Arthritis     Back pain     after trauma beginning in 195    Cataract     Chronic pain     neck and left shoulder    Cluster headache 2013    Colon polyp     Degenerative disc disease     Depression     Diverticulitis 12/2013    Elevated PSA     Fibromyalgia 2013    GERD (gastroesophageal reflux disease)     Hepatitis 1970's    A    History of prostate biopsy 2002    Hyperlipidemia     Joint pain     Sleep apnea     Special screening for malignant neoplasms, colon 5/5/2014    Thyroid nodule 7/16/2014    Visual disturbance 2012    problems after cataract surgery       Past Surgical History:   Procedure Laterality Date    BACK SURGERY      CATARACT EXTRACTION W/  INTRAOCULAR LENS IMPLANT Bilateral     CHOLECYSTECTOMY      COLONOSCOPY N/A 12/17/2019    Procedure: COLONOSCOPY;  Surgeon: Amadou Hardin MD;  Location: [facility] (4TH [facility]);   Service: Endoscopy;  Laterality: N/A;  pt request to do Miralax bowel prep-BB    COSMETIC SURGERY  2/10/2015    Direct mid-forehead brow lift    COSMETIC SURGERY  2/10/2015    Bilateral upper lid blepahroplasty    CYSTOSCOPY WITH URETEROSCOPY, RETROGRADE PYELOGRAPHY, AND INSERTION OF STENT Left 8/19/2020    Procedure: CYSTOSCOPY, WITH RETROGRADE PYELOGRAM AND URETERAL STENT INSERTION;  Surgeon: Katelynn George MD;  Location: [facility];  Service: Urology;  Laterality: Left;    ESOPHAGOGASTRODUODENOSCOPY N/A 12/17/2019    Procedure: ESOPHAGOGASTRODUODENOSCOPY (EGD);  Surgeon: Amadou Hardin MD;  Location: [facility] ([address] [place] [address]);  Service: Endoscopy;  Laterality: N/A;    EYE SURGERY      HEMORRHOID SURGERY      with complication of chronic bleeding for 6 weeks     INJECTION OF STEROID Right 12/6/2018    Procedure: INJECTION, STEROID Right SI Joint Block and Steroid Injection;  Surgeon: Jason Caldwell MD;  Location: [facility];  Service: Neurosurgery;  Laterality: Right;  Procedure: Right SI Joint Block and Steroid Injection  Surgery Time: 30 MIN  LOS: 0  Anesthesia: MAC  Radiology: C-arm  Bed: [place] [address] Poster  Position: Prone    INJECTION OF STEROID Right 9/19/2019    Procedure: INJECTION, STEROID Procedure: Right SI joint block nd steroid injection;  Surgeon: Jason Caldwell MD;  Location: [facility];  Service: Neurosurgery;  Laterality: Right;  Procedure: Right SI joint block nd steroid injection  Surgery Time: 30 mins  LOS:   Anesthesia: General MAC  Radiology:C-arm  Bed: Regular Bed  Position: Prone    JOINT REPLACEMENT      KNEE SURGERY      involving arthroscopic surgery to both knees    SINUS SURGERY      SINUS SURGERY      left molar and sinus surgery for trigeminal neuralgia    SPINAL FUSION  6/22/2015    L3-L5 XLIF    SPINE SURGERY  6/22/15    XLIF/TANA    TOTAL HIP ARTHROPLASTY  4/2012    Pt states he had total hip replacement on his left hip.       Time Tracking:     OT Date of Treatment:  08/20/20  OT Start Time: 1411  OT Stop Time: 1440  OT Total Time (min): 29 min    Billable Minutes:Evaluation 15 co treatment     Marie Gomez, OT  8/20/2020

## 2020-08-20 NOTE — PLAN OF CARE
Permission to enter room through virtual sytem attained from patient.  The role of virtual nurse explained. Admission questions asked and replies entered into chart   Safety precautions reviewed with patient. Current orders and  plan of care reviewed.

## 2020-08-20 NOTE — ANESTHESIA PROCEDURE NOTES
Intubation  Performed by: Mercedes Christian CRNA  Authorized by: Beti Lay MD     Intubation:     Induction:  Intravenous    Intubated:  Postinduction    Mask Ventilation:  Easy with oral airway    Attempts:  1    Attempted By:  Student (UBALDO Mendez)    Method of Intubation:  Direct    Blade:  Kanika 3    Laryngeal View Grade: Grade IIA - cords partially seen      Difficult Airway Encountered?: No      Complications:  None    Airway Device:  Oral endotracheal tube    Airway Device Size:  7.5    Style/Cuff Inflation:  Cuffed    Inflation Amount (mL):  6    Tube secured:  21    Secured at:  The lips    Placement Verified By:  Capnometry    Complicating Factors:  Anterior larynx    Findings Post-Intubation:  BS equal bilateral

## 2020-08-20 NOTE — H&P
NEUROSURGICAL PROGRESS NOTE   DATE OF SERVICE:   08/20/20  ATTENDING PHYSICIAN:   Jason Caldwell MD   SUBJECTIVE:   INTERIM HISTORY:   This is a very pleasant 70 y.o. male, who is status post L3-5 direct lateral interbody fusion. He then developed worsening low back pain, bilateral leg pain and bilateral foot drop. In October 2019 he was diagnosed with a new L5-S1 disc herniation and was treated with a microdiskectomy. After the surgery his right leg pain improved. However over the last 6 months despite doing physical therapy he has been complaining of worsening low back pain, bilateral leg pain and difficulty walking. He is wearing AFO brace bilaterally. His condition is affecting his quality of life and functional status. He has had many spinal injection and SI joint injections in the past with only partial pain relief.   Low Back Pain Scale   R Low Back-Pain Score: 7   R Low Back-Pain Intensity: Pain killers give moderate relief from pain   Personal Care : It is painful to look after myself and I am slow and careful.   Lifting: I can only lift very light weights.   Walking: Pain prevents me walking more than 1/4 mile.   Sitting: Pain prevents me from sitting more than one hour.   Low Back-Standing: I cannot stand for longer than 30 mins without increasing pain   Low Back-Sleeping: Because of pain my normal nights sleep is reduced by less than one half   Social Life: Pain has restricted my social life, and I do not go out as often.   Low Back-Traveling: I have extra pain while traveling but it does not compel me to seek alternate forms of travel   Low Back-Changing Degree of Pain: My pain seems to be getting better but improvement is slow     PAST MEDICAL HISTORY:        Active Ambulatory Problems    Diagnosis Date Noted    Depression     Hyperlipidemia     Chronic pain     Colon polyp     Adenomatous polyp     History of prostate biopsy     GERD (gastroesophageal reflux disease)     Back pain     Sleep  apnea     Visual disturbances 10/03/2012    Spondylosis without myelopathy 11/09/2012    Degeneration of lumbar or lumbosacral intervertebral disc 11/09/2012    Spinal stenosis, lumbar region, without neurogenic claudication 11/09/2012    Thoracic or lumbosacral neuritis or radiculitis, unspecified 11/09/2012    Displacement of lumbar intervertebral disc without myelopathy 11/09/2012    Acquired spondylolisthesis 11/09/2012    Lumbago 11/09/2012    Status post cataract extraction and insertion of intraocular lens - Both Eyes 11/13/2012    Prostatitis, acute 12/17/2012    Fibromyalgia 10/21/2013    OP (osteoporosis) 10/21/2013    Compression fracture of T12 vertebra 10/21/2013    Diverticulitis large intestine 12/21/2013    Osteoarthritis 03/19/2014    Lower back pain 04/01/2014    Lower extremity pain 04/01/2014    Muscle weakness 04/01/2014    Range of motion deficit 04/01/2014    Special screening for malignant neoplasms, colon 05/05/2014    Cervicalgia 06/19/2014    Facet joint disease of cervical region 06/19/2014    Occipital neuralgia 06/19/2014    Chronic migraine without aura, with intractable migraine, so stated, with status migrainosus 06/19/2014    Cervical radiculopathy 06/19/2014    Paroxysmal hemicrania 06/19/2014    Sciatic nerve pain 06/19/2014    Chronic LBP 06/27/2014    DJD (degenerative joint disease) of lumbar spine 06/27/2014    Chronic neck pain 06/27/2014    Right lumbar radiculopathy 06/27/2014    Thyroid nodule 07/16/2014    Carpal tunnel syndrome of right wrist 07/16/2014    Paresthesia 08/01/2014    Brow ptosis 02/10/2015    Degenerative disc disease 06/22/2015    Encounter for postoperative wound check 08/01/2015    Lumbar radiculopathy 09/15/2015    Cervical spondylosis 10/22/2015    S/P lumbar spinal fusion 12/02/2015    Right wrist tendinitis 07/28/2017    Pain in right wrist 07/28/2017    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  11/10/2017    Headache around the eyes 06/26/2018    Closed fracture of left distal radius 02/05/2019    Pain in left wrist 03/19/2019    Bilateral foot-drop 07/16/2019    Idiopathic peripheral neuropathy 07/16/2019    Sacroiliac joint dysfunction of right side 07/16/2019    SI (sacroiliac) joint dysfunction 09/19/2019    Episodic migraine without status migrainosus, not intractable 10/25/2019    Lumbar disc herniation with radiculopathy 10/25/2019    Anxiety about health 11/12/2019    MDD (major depressive disorder), recurrent episode, moderate 11/12/2019    Anxiety 11/14/2019    Weakness of both lower extremities 11/20/2019    History of colon polyps 12/17/2019    Iron deficiency anemia 05/15/2020    Sicca syndrome with keratoconjunctivitis 05/27/2020          Resolved Ambulatory Problems    Diagnosis Date Noted    Blepharitis of both eyes - Both Eyes 11/13/2012    Low back pain without sciatica 10/27/2015    Lumbar radicular pain 10/27/2015    Gait abnormality 02/21/2017    Impaired functional mobility, balance, gait, and endurance 02/24/2017    Left hip pain 05/24/2017    Difficulty walking 11/01/2017    Weakness 11/01/2017    Lumbar back pain with radiculopathy affecting lower extremity 11/15/2018          Past Medical History:   Diagnosis Date    Allergy     Arthritis     Cataract     Cluster headache 2013    Diverticulitis 12/2013    Elevated PSA     Hepatitis 1970's    Joint pain     Visual disturbance 2012     PAST SURGICAL HISTORY:         Past Surgical History:   Procedure Laterality Date    BACK SURGERY      CATARACT EXTRACTION W/ INTRAOCULAR LENS IMPLANT Bilateral     CHOLECYSTECTOMY      COLONOSCOPY N/A 12/17/2019    Procedure: COLONOSCOPY; Surgeon: Amadou Hardin MD; Location: [address]; Service: Endoscopy; Laterality: N/A; pt request to do Miralax bowel prep-BB    COSMETIC SURGERY  2/10/2015    Direct mid-forehead brow lift    COSMETIC SURGERY   2/10/2015    Bilateral upper lid blepahroplasty    ESOPHAGOGASTRODUODENOSCOPY N/A 12/17/2019    Procedure: ESOPHAGOGASTRODUODENOSCOPY (EGD); Surgeon: Amadou Hardin MD; Location: [address]); Service: Endoscopy; Laterality: N/A;    EYE SURGERY      HEMORRHOID SURGERY      with complication of chronic bleeding for 6 weeks     INJECTION OF STEROID Right 12/6/2018    Procedure: INJECTION, STEROID Right SI Joint Block and Steroid Injection; Surgeon: Jason Caldwell MD; Location: [facility]; Service: Neurosurgery; Laterality: Right; Procedure: Right SI Joint Block and Steroid Injection   Surgery Time: 30 MIN   LOS: 0   Anesthesia: MAC   Radiology: C-arm   Bed: [place] [id number] Poster   Position: Prone    INJECTION OF STEROID Right 9/19/2019    Procedure: INJECTION, STEROID Procedure: Right SI joint block nd steroid injection; Surgeon: Jason Caldwell MD; Location: [facility]; Service: Neurosurgery; Laterality: Right; Procedure: Right SI joint block nd steroid injection   Surgery Time: 30 mins   LOS:   Anesthesia: General MAC   Radiology:C-arm   Bed: Regular Bed   Position: Prone    JOINT REPLACEMENT      KNEE SURGERY      involving arthroscopic surgery to both knees    SINUS SURGERY      SINUS SURGERY      left molar and sinus surgery for trigeminal neuralgia    SPINAL FUSION  6/22/2015    L3-L5 XLIF    SPINE SURGERY  6/22/15    XLIF/TANA    TOTAL HIP ARTHROPLASTY  4/2012    Pt states he had total hip replacement on his left hip.     SOCIAL HISTORY:   Social History                                                                                                                                                                                                                                                                                  FAMILY HISTORY:         Family History   Problem Relation Age of Onset    Cancer Mother     colon; uterine    Nephrolithiasis Mother     Stroke Mother 86    Pancreatitis  Brother     Vision loss Brother     macular degeneration    Diabetes Brother     Macular degeneration Brother     Migraines Sister     No Known Problems Father     No Known Problems Maternal Grandmother     No Known Problems Maternal Grandfather     No Known Problems Paternal Grandmother     No Known Problems Paternal Grandfather     No Known Problems Sister     No Known Problems Maternal Aunt     No Known Problems Maternal Uncle     No Known Problems Paternal Aunt     No Known Problems Paternal Uncle     Melanoma Neg Hx     Amblyopia Neg Hx     Blindness Neg Hx     Cataracts Neg Hx     Glaucoma Neg Hx     Hypertension Neg Hx     Retinal detachment Neg Hx     Strabismus Neg Hx     Thyroid disease Neg Hx     Allergic rhinitis Neg Hx     Allergies Neg Hx     Angioedema Neg Hx     Asthma Neg Hx     Eczema Neg Hx     Urticaria Neg Hx     Rhinitis Neg Hx     Immunodeficiency Neg Hx     Atopy Neg Hx      CURRENTS MEDICATIONS:          Current Outpatient Medications on File Prior to Visit   Medication Sig Dispense Refill    butalbital-acetaminophen-caffeine -40 mg (FIORICET, ESGIC) -40 mg per tablet Take 1 tablet by mouth 6 hours as needed for Headaches. 30 tablet 0    clonazePAM (KLONOPIN) 0.5 MG tablet Take two tablets by mouth at bedtime and then one tablet by mouth as needed for tinnitus. 75 tablet 5    cyanocobalamin 1,000 mcg/mL injection       diclofenac sodium (VOLTAREN) 1 % Gel Apply 2 g topically once daily. 100 g 0    famotidine (PEPCID) 20 MG tablet TAKE 1 TABLET BY MOUTH TWICE A DAY 60 tablet 12    HYDROcodone-acetaminophen (NORCO) 5-325 mg per tablet Take 1 tablet by mouth every 12 (twelve) hours as needed for Pain (moderate to severe). 60 tablet 0    lamoTRIgine (LAMICTAL) 200 MG tablet Take 1 tablet (200 mg total) by mouth once daily. 90 tablet 3    methocarbamoL (ROBAXIN) 750 MG Tab Take 1 tablet (750 mg total) by mouth 3 (three) times daily as needed. 90  tablet 11    pravastatin (PRAVACHOL) 40 MG tablet Take 1 tablet (40 mg total) by mouth once daily. 90 tablet 3    pregabalin (LYRICA) 50 MG capsule Take 1 capsule (50 mg total) by mouth 2 (two) times daily. 60 capsule 5    RABEprazole (ACIPHEX) 20 mg tablet Take 1 tablet (20 mg total) by mouth 2 (two) times daily. 180 tablet 3    selegiline (EMSAM) 12 mg/24 hr Place 1 new patch onto the skin once daily. 90 patch 3    senna-docusate 8.6-50 mg (PERICOLACE) 8.6-50 mg per tablet Take 2 tablets by mouth nightly as needed for Constipation.      sucralfate (CARAFATE) 1 gram tablet TAKE 1 TABLET BY MOUTH 4 TIMES A  tablet 4    tadalafil (CIALIS) 5 MG tablet Take 1 tablet (5 mg total) by mouth once daily. 30 tablet 12    traZODone (DESYREL) 100 MG tablet Take 1 tablet (100 mg total) by mouth every evening. 90 tablet 3    ubrogepant (UBRELVY)tablet 50 mg Take 1 tablet (50 mg total) by mouth as needed for Migraine. (Patient taking differently: Take 50 mg by mouth once as needed for Migraine. ) 10 tablet 1     No current facility-administered medications on file prior to visit.      ALLERGIES:         Review of patient's allergies indicates:   Allergen Reactions    Alphagan [brimonidine]      Patient taking MASO-B Selective Inhibitor Selegiline (Emsam)    Coumadin [warfarin]      itch    Oxycodone      hiccups    Pennsaid [diclofenac sodium] Rash     REVIEW OF SYSTEMS:   Review of Systems   Constitutional: Negative for diaphoresis, fever and weight loss.   Respiratory: Negative for shortness of breath.   Cardiovascular: Negative for chest pain.   Gastrointestinal: Negative for blood in stool.   Genitourinary: Negative for hematuria.   Endo/Heme/Allergies: Does not bruise/bleed easily.   All other systems reviewed and are negative.     OBJECTIVE:   PHYSICAL EXAMINATION:       Vitals:    07/22/20 1520   BP: (!) 154/88   Pulse: 72     Physical Exam:   Vitals reviewed.   Constitutional: He appears well-developed  and well-nourished.   Eyes: Pupils are equal, round, and reactive to light. Conjunctivae and EOM are normal.   Cardiovascular: Normal distal pulses and no edema.   Abdominal: Soft.   Skin: Skin displays no rash on trunk and no rash on extremities. Skin displays no lesions on trunk and no lesions on extremities.     Psych/Behavior: He is alert. He is oriented to person, place, and time. He has a normal mood and affect.     Musculoskeletal:   Neck: Range of motion is full.     Neurological:   DTRs: Tricep reflexes are 2+ on the right side and 2+ on the left side. Bicep reflexes are 2+ on the right side and 2+ on the left side. Brachioradialis reflexes are 2+ on the right side and 2+ on the left side. Patellar reflexes are 2+ on the right side and 2+ on the left side. Achilles reflexes are 2+ on the right side and 2+ on the left side.     Back Exam   Tenderness   The patient is experiencing tenderness in the lumbar.   Range of Motion   Extension: abnormal   Flexion: abnormal   Lateral bend right: abnormal   Lateral bend left: abnormal   Rotation right: abnormal   Rotation left: abnormal   Muscle Strength   Right Quadriceps: 5/5   Left Quadriceps: 5/5   Right Hamstrings: 5/5   Left Hamstrings: 5/5   Tests   Straight leg raise right: negative   Straight leg raise left: negative   Other   Toe walk: normal   Heel walk: normal     SI joint:   Palpation at the right and left SI joints not painful   ABDIEL test is negative bilaterally   Gaenslen test is negative bilaterally   Thigh thrust test is negative bilaterally   Neurologic Exam   Mental Status   Oriented to person, place, and time.   Speech: speech is normal   Level of consciousness: alert   Cranial Nerves   Cranial nerves II through XII intact.   CN III, IV, VI   Pupils are equal, round, and reactive to light.  Extraocular motions are normal.   Motor Exam   Muscle bulk: normal  Overall muscle tone: normal   Strength   Right deltoid: 5/5   Left deltoid: 5/5   Right  biceps: 5/5   Left biceps: 5/5   Right triceps: 5/5   Left triceps: 5/5   Right wrist flexion: 5/5   Left wrist flexion: 5/5   Right wrist extension: 5/5   Left wrist extension: 5/5   Right interossei: 5/5   Left interossei: 5/5   Right iliopsoas: 5/5   Left iliopsoas: 5/5   Right quadriceps: 5/5   Left quadriceps: 5/5   Right hamstrin/5   Left hamstrin/5   Right anterior tibial: 4/5   Left anterior tibial: 4/5   Right posterior tibial: 5/5   Left posterior tibial: 5/5   Right peroneal: 4/5   Left peroneal: 4/5   Right gastroc: 5/5   Left gastroc: 5/5   Sensory Exam   Light touch normal.   Pinprick normal.   Gait, Coordination, and Reflexes   Gait   Gait: (Antalgic)   Coordination   Finger to nose coordination: normal   Reflexes   Right brachioradialis: 2+  Left brachioradialis: 2+  Right biceps: 2+  Left biceps: 2+  Right triceps: 2+  Left triceps: 2+  Right patellar: 2+  Left patellar: 2+  Right achilles: 2+  Left achilles: 2+  Right plantar: normal  Left plantar: normal  Right De Leon: absent  Left De Leon: absent  Right ankle clonus: absent  Left ankle clonus: absent     DIAGNOSTIC DATA:   I personally interpreted the following imaging:   Today lumbar flexion-extension x-rays shows L5-S1 degenerative spondylolisthesis grade 1 with 3 mm of movement in flexion-extension   Lumbar spine MRI 2020 shows severe degenerative disc disease at L5-S1 with spondylolisthesis and severe bilateral foraminal stenosis   ASSESMENT:   This is a 70 y.o. male with       Problem List Items Addressed This Visit       None                  Visit Diagnoses       Spinal instability of lumbosacral region - Primary    Degenerative disc disease, lumbar     Foraminal stenosis of lumbosacral region     Status post lumbar spinal fusion     Progressive focal motor weakness           PLAN:   I explained the natural history of the disease and all treatment options. I recommended a left L5-S1 anterior interbody fusion to treat his  L5-S1 instability and to indirectly decompress his bilateral L5 foraminal stenosis and radiculopathy. His L5 radiculopathy is worsening as well as his bilateral dorsiflexion weakness   We have discussed the risks of surgery including bleeding, infection, failure of surgery, CSF leak, nerve root injury, spinal cord injury, ureter injury, weakness, paralysis, peripheral neuropathy, malplaced hardware, migration of hardware, non-union, need for reoperation. Patient understands the risks and would like to proceed with surgery.   Ureteral stent placed by Urology under the of the surgery to protect the left ureter during the approach   All questions answered     Jason Caldwell MD   Cell:[phone]

## 2020-08-20 NOTE — TRANSFER OF CARE
"Anesthesia Transfer of Care Note    Patient: Raffy Rutherford Jr.    Procedure(s) Performed: Procedure(s) (LRB):  FUSION, SPINE, LUMBAR, ANTERIOR APPROACH L5-S1 ALIF Stand Alone (N/A)    Patient location: PACU    Anesthesia Type: general    Transport from OR: Transported from OR on 100% O2 by closed face mask with adequate spontaneous ventilation    Post pain: adequate analgesia    Post assessment: no apparent anesthetic complications    Post vital signs: stable    Level of consciousness: sedated    Nausea/Vomiting: no nausea/vomiting    Complications: none    Transfer of care protocol was followed      Last vitals:   Visit Vitals  /67   Pulse (!) 56   Temp 36.7 °C (98.1 °F) (Skin)   Resp 15   Ht 5' 8" (1.727 m)   Wt 78 kg (172 lb)   SpO2 99%   BMI 26.15 kg/m²     "

## 2020-08-21 LAB
ANION GAP SERPL CALC-SCNC: 7 MMOL/L (ref 8–16)
BASOPHILS # BLD AUTO: 0.02 K/UL (ref 0–0.2)
BASOPHILS NFR BLD: 0.2 % (ref 0–1.9)
BUN SERPL-MCNC: 8 MG/DL (ref 8–23)
CALCIUM SERPL-MCNC: 8.7 MG/DL (ref 8.7–10.5)
CHLORIDE SERPL-SCNC: 105 MMOL/L (ref 95–110)
CO2 SERPL-SCNC: 28 MMOL/L (ref 23–29)
CREAT SERPL-MCNC: 0.8 MG/DL (ref 0.5–1.4)
DIFFERENTIAL METHOD: ABNORMAL
EOSINOPHIL # BLD AUTO: 0 K/UL (ref 0–0.5)
EOSINOPHIL NFR BLD: 0.1 % (ref 0–8)
ERYTHROCYTE [DISTWIDTH] IN BLOOD BY AUTOMATED COUNT: 18.9 % (ref 11.5–14.5)
EST. GFR  (AFRICAN AMERICAN): >60 ML/MIN/1.73 M^2
EST. GFR  (NON AFRICAN AMERICAN): >60 ML/MIN/1.73 M^2
GLUCOSE SERPL-MCNC: 127 MG/DL (ref 70–110)
HCT VFR BLD AUTO: 43.1 % (ref 40–54)
HGB BLD-MCNC: 13.8 G/DL (ref 14–18)
IMM GRANULOCYTES # BLD AUTO: 0.02 K/UL (ref 0–0.04)
IMM GRANULOCYTES NFR BLD AUTO: 0.2 % (ref 0–0.5)
LYMPHOCYTES # BLD AUTO: 1.9 K/UL (ref 1–4.8)
LYMPHOCYTES NFR BLD: 18.9 % (ref 18–48)
MCH RBC QN AUTO: 29.4 PG (ref 27–31)
MCHC RBC AUTO-ENTMCNC: 32 G/DL (ref 32–36)
MCV RBC AUTO: 92 FL (ref 82–98)
MONOCYTES # BLD AUTO: 0.9 K/UL (ref 0.3–1)
MONOCYTES NFR BLD: 8.7 % (ref 4–15)
NEUTROPHILS # BLD AUTO: 7.2 K/UL (ref 1.8–7.7)
NEUTROPHILS NFR BLD: 71.9 % (ref 38–73)
NRBC BLD-RTO: 0 /100 WBC
PLATELET # BLD AUTO: 166 K/UL (ref 150–350)
PMV BLD AUTO: 9.2 FL (ref 9.2–12.9)
POTASSIUM SERPL-SCNC: 4.5 MMOL/L (ref 3.5–5.1)
RBC # BLD AUTO: 4.7 M/UL (ref 4.6–6.2)
SODIUM SERPL-SCNC: 140 MMOL/L (ref 136–145)
WBC # BLD AUTO: 9.97 K/UL (ref 3.9–12.7)

## 2020-08-21 PROCEDURE — 94799 UNLISTED PULMONARY SVC/PX: CPT

## 2020-08-21 PROCEDURE — 80048 BASIC METABOLIC PNL TOTAL CA: CPT

## 2020-08-21 PROCEDURE — 11000001 HC ACUTE MED/SURG PRIVATE ROOM

## 2020-08-21 PROCEDURE — 99900035 HC TECH TIME PER 15 MIN (STAT)

## 2020-08-21 PROCEDURE — 85025 COMPLETE CBC W/AUTO DIFF WBC: CPT

## 2020-08-21 PROCEDURE — 36415 COLL VENOUS BLD VENIPUNCTURE: CPT

## 2020-08-21 PROCEDURE — 97535 SELF CARE MNGMENT TRAINING: CPT | Mod: CO

## 2020-08-21 PROCEDURE — 94761 N-INVAS EAR/PLS OXIMETRY MLT: CPT

## 2020-08-21 PROCEDURE — 97530 THERAPEUTIC ACTIVITIES: CPT | Mod: CQ

## 2020-08-21 PROCEDURE — 25000003 PHARM REV CODE 250: Performed by: NEUROLOGICAL SURGERY

## 2020-08-21 PROCEDURE — 97116 GAIT TRAINING THERAPY: CPT | Mod: CQ

## 2020-08-21 PROCEDURE — 63600175 PHARM REV CODE 636 W HCPCS: Performed by: NEUROLOGICAL SURGERY

## 2020-08-21 RX ORDER — PREGABALIN 75 MG/1
150 CAPSULE ORAL NIGHTLY
Status: DISCONTINUED | OUTPATIENT
Start: 2020-08-21 | End: 2020-08-22 | Stop reason: HOSPADM

## 2020-08-21 RX ORDER — CLONAZEPAM 0.5 MG/1
1 TABLET ORAL NIGHTLY
Status: DISCONTINUED | OUTPATIENT
Start: 2020-08-21 | End: 2020-08-22 | Stop reason: HOSPADM

## 2020-08-21 RX ORDER — PREGABALIN 50 MG/1
100 CAPSULE ORAL 3 TIMES DAILY
Status: DISCONTINUED | OUTPATIENT
Start: 2020-08-21 | End: 2020-08-22 | Stop reason: HOSPADM

## 2020-08-21 RX ORDER — POLYETHYLENE GLYCOL 3350 17 G/17G
17 POWDER, FOR SOLUTION ORAL 2 TIMES DAILY
Status: DISCONTINUED | OUTPATIENT
Start: 2020-08-21 | End: 2020-08-22 | Stop reason: HOSPADM

## 2020-08-21 RX ADMIN — TRAZODONE HYDROCHLORIDE 100 MG: 100 TABLET ORAL at 09:08

## 2020-08-21 RX ADMIN — PREGABALIN 100 MG: 50 CAPSULE ORAL at 02:08

## 2020-08-21 RX ADMIN — ACETAMINOPHEN 650 MG: 325 TABLET ORAL at 12:08

## 2020-08-21 RX ADMIN — HYDROMORPHONE HYDROCHLORIDE 2 MG: 2 INJECTION, SOLUTION INTRAMUSCULAR; INTRAVENOUS; SUBCUTANEOUS at 09:08

## 2020-08-21 RX ADMIN — HYDROMORPHONE HYDROCHLORIDE 2 MG: 2 INJECTION, SOLUTION INTRAMUSCULAR; INTRAVENOUS; SUBCUTANEOUS at 04:08

## 2020-08-21 RX ADMIN — MUPIROCIN: 20 OINTMENT TOPICAL at 09:08

## 2020-08-21 RX ADMIN — CELECOXIB 200 MG: 100 CAPSULE ORAL at 08:08

## 2020-08-21 RX ADMIN — BISACODYL 10 MG: 10 SUPPOSITORY RECTAL at 12:08

## 2020-08-21 RX ADMIN — METHOCARBAMOL TABLETS 750 MG: 750 TABLET, COATED ORAL at 08:08

## 2020-08-21 RX ADMIN — PREGABALIN 50 MG: 50 CAPSULE ORAL at 08:08

## 2020-08-21 RX ADMIN — PREGABALIN 100 MG: 50 CAPSULE ORAL at 09:08

## 2020-08-21 RX ADMIN — ACETAMINOPHEN 650 MG: 325 TABLET ORAL at 06:08

## 2020-08-21 RX ADMIN — DEXTROSE MONOHYDRATE, SODIUM CHLORIDE, AND POTASSIUM CHLORIDE: 50; 9; 1.49 INJECTION, SOLUTION INTRAVENOUS at 06:08

## 2020-08-21 RX ADMIN — PREGABALIN 150 MG: 75 CAPSULE ORAL at 09:08

## 2020-08-21 RX ADMIN — METHOCARBAMOL TABLETS 750 MG: 750 TABLET, COATED ORAL at 09:08

## 2020-08-21 RX ADMIN — MUPIROCIN: 20 OINTMENT TOPICAL at 08:08

## 2020-08-21 RX ADMIN — LAMOTRIGINE 200 MG: 100 TABLET ORAL at 08:08

## 2020-08-21 RX ADMIN — HYDROMORPHONE HYDROCHLORIDE 2 MG: 2 INJECTION, SOLUTION INTRAMUSCULAR; INTRAVENOUS; SUBCUTANEOUS at 08:08

## 2020-08-21 RX ADMIN — CLONAZEPAM 1 MG: 0.5 TABLET ORAL at 09:08

## 2020-08-21 RX ADMIN — PANTOPRAZOLE SODIUM 40 MG: 40 TABLET, DELAYED RELEASE ORAL at 08:08

## 2020-08-21 RX ADMIN — CELECOXIB 200 MG: 100 CAPSULE ORAL at 09:08

## 2020-08-21 RX ADMIN — HEPARIN SODIUM 5000 UNITS: 5000 INJECTION INTRAVENOUS; SUBCUTANEOUS at 09:08

## 2020-08-21 RX ADMIN — POLYETHYLENE GLYCOL 3350 17 G: 17 POWDER, FOR SOLUTION ORAL at 06:08

## 2020-08-21 RX ADMIN — METHOCARBAMOL TABLETS 750 MG: 750 TABLET, COATED ORAL at 02:08

## 2020-08-21 RX ADMIN — HYDROMORPHONE HYDROCHLORIDE 2 MG: 2 INJECTION, SOLUTION INTRAMUSCULAR; INTRAVENOUS; SUBCUTANEOUS at 12:08

## 2020-08-21 RX ADMIN — HEPARIN SODIUM 5000 UNITS: 5000 INJECTION INTRAVENOUS; SUBCUTANEOUS at 08:08

## 2020-08-21 NOTE — PLAN OF CARE
Problem: Physical Therapy Goal  Goal: Physical Therapy Goal  Description: Goals to be met by: 20     Patient will increase functional independence with mobility by performin. Supine to sit with Modified [place]  2. Sit to supine with Modified [place]  3. Gait  x 200 feet with Modified [place] using least restrictive device.   4. Lower extremity exercise program x10 reps per handout, with independence    Outcome: Ongoing, Progressing   Goals ongoing

## 2020-08-21 NOTE — PLAN OF CARE
Pt is AAOx3 with complaints of pain in L abdomen/groin area with relief from dilaudid shots, tylenol and robaxin. Pt working with PT/OT today and has been up walking in the [place] several times with the walker. Pt with tingling to R leg. Pt wearing LSO brace when sitting up standing.

## 2020-08-21 NOTE — PT/OT/SLP PROGRESS
Occupational Therapy   Treatment    Name: Raffy Rutherford Jr.  MRN: [id number]  Admitting Diagnosis:  Spondylolisthesis at L5-S1 level  1 Day Post-Op    Recommendations:     Discharge Recommendations: outpatient PT, outpatient OT  Discharge Equipment Recommendations:  none  Barriers to discharge:  None    Assessment:     Raffy Rutherford Jr. is a 70 y.o. male with a medical diagnosis of Spondylolisthesis at L5-S1 level.  Performance deficits affecting function are weakness, impaired endurance, impaired self care skills, impaired functional mobilty, gait instability, impaired balance, decreased lower extremity function, pain, decreased ROM, impaired skin, orthopedic precautions.  Pt found in bed, agreeable to therapy. He tolerated tx well despite pain in L groin during mobility and LB drsg.   He was able to maintain spinal precautions during all tasks. Continue OT services to address functional goals, progressing as able.      Rehab Prognosis:  Good; patient would benefit from acute skilled OT services to address these deficits and reach maximum level of function.       Plan:     Patient to be seen 5 x/week to address the above listed problems via self-care/home management, therapeutic activities, therapeutic exercises  · Plan of Care Expires: 09/20/20  · Plan of Care Reviewed with: patient    Subjective     Pain/Comfort:  · Pain Rating 1: (Pt did not rate but reports L groin pain and incisional pain.)    Objective:     Communicated with: RN prior to session.  Patient found HOB elevated with telemetry, central line, PCEA upon OT entry to room.    General Precautions: Standard, fall   Orthopedic Precautions:spinal precautions   Braces: LSO     Occupational Performance:     Bed Mobility:    · Patient completed Rolling/Turning to Right with stand by assistance and with side rail  · Patient completed Scooting/Bridging with stand by assistance  · Patient completed Supine to Sit with stand by assistance and HOB elevated,  increased time and effort, vc's for effective technique    Functional Mobility/Transfers:  · Patient completed Sit <> Stand Transfer with stand by assistance  with  rolling walker and vc's for hand placement   · Patient completed Bed <> Chair Transfer using Stand Pivot technique with stand by assistance with rolling walker  · Functional Mobility: Pt ambulated room distances with SBA using RW.     Activities of Daily Living:  · Grooming: stand by assistance standing at sink  · Upper Body Dressing: stand by assistance pt able to monisha LSO without assist seated EOB.  · Lower Body Dressing: stand by assistance Figure 4 technique to doff/monisha socks Reviewed use of AE: sock aid, reacher to assist with LB drsg. Pt reports having these items at home from prior THR.       [facility] 6 Click ADL: 20    Treatment & Education:  Provided pt with handout on maintaining spinal precautions during ADL's and log roll technique and reviewed.      Patient left up in chair with all lines intact, call button in reach, RN notified and spouse presentEducation:      GOALS:   Multidisciplinary Problems     Occupational Therapy Goals        Problem: Occupational Therapy Goal    Goal Priority Disciplines Outcome Interventions   Occupational Therapy Goal     OT, PT/OT Ongoing, Progressing    Description: Goals to be met by: 9/20/20     Patient will increase functional independence with ADLs by performing:    LE Dressing with Supervision and Assistive Devices as needed.  Grooming while standing with Supervision.  Toileting from toilet with Supervision for hygiene and clothing management.   Supine to sit with Supervision.  Step transfer with Supervision  Toilet transfer to toilet with Supervision.  Increased functional strength to WFL for self care skills and functional mobility .  Upper extremity exercise program x10 reps per handout, with independence.                     Time Tracking:     OT Date of Treatment: 08/21/20  OT Start Time: 0958  OT Stop  Time: 1024  OT Total Time (min): 26 min    Billable Minutes:Self Care/Home Management 26    ANDI Brown/JACK  8/21/2020

## 2020-08-21 NOTE — PT/OT/SLP PROGRESS
Physical Therapy Treatment    Patient Name:  Raffy Rutherford Jr.   MRN:  [id number]    Recommendations:     Discharge Recommendations:  outpatient PT, outpatient OT   Discharge Equipment Recommendations: none   Barriers to discharge: decreased mobility,strength and endurance    Assessment:     Raffy Rutherford Jr. is a 70 y.o. male admitted with a medical diagnosis of Spondylolisthesis at L5-S1 level.  He presents with the following impairments/functional limitations:  weakness, impaired endurance, impaired functional mobilty, gait instability, impaired balance, decreased lower extremity function, decreased ROM, impaired coordination, impaired skin, orthopedic precautions,pt with good participation and requires assistance with all mobility,pt will benefit from  services upon discharge.    Rehab Prognosis: Good; patient would benefit from acute skilled PT services to address these deficits and reach maximum level of function.    Recent Surgery: Procedure(s) (LRB):  FUSION, SPINE, LUMBAR, ANTERIOR APPROACH L5-S1 ALIF Stand Alone (N/A) 1 Day Post-Op    Plan:     During this hospitalization, patient to be seen daily to address the identified rehab impairments via gait training, therapeutic activities, therapeutic exercises, neuromuscular re-education and progress toward the following goals:    · Plan of Care Expires:  09/20/20    Subjective     Chief Complaint: n/a  Patient/Family Comments/goals: pt agreeable to up in chair.  Pain/Comfort:  · Pain Rating 1: (no c/o's)  · Pain Addressed 1: Pre-medicate for activity      Objective:     Communicated with nsg prior to session.  Patient found supine with PCEA, telemetry upon PT entry to room.     General Precautions: Standard, fall   Orthopedic Precautions:spinal precautions   Braces:       Functional Mobility:  · Bed Mobility:     · Supine to Sit: contact guard assistance and via logroll  · Transfers:     · Sit to Stand:  stand by assistance with rolling walker  · Bed to Chair:  stand by assistance with  rolling walker  using  ambulation  · Gait: amb ~175' with RW and LSO donned X 2-3 standing rests with SBA  · Balance: fair standing balance with RW      AM-PAC 6 CLICK MOBILITY  Turning over in bed (including adjusting bedclothes, sheets and blankets)?: 3  Sitting down on and standing up from a chair with arms (e.g., wheelchair, bedside commode, etc.): 3  Moving from lying on back to sitting on the side of the bed?: 3  Moving to and from a bed to a chair (including a wheelchair)?: 3  Need to walk in hospital room?: 3  Climbing 3-5 steps with a railing?: 1  Basic Mobility Total Score: 16       Therapeutic Activities and Exercises: rest periods PRN during rx.       Patient left up in chair with all lines intact, call button in reach, nsg notified and MD present..    GOALS: see general POC  Multidisciplinary Problems     Physical Therapy Goals        Problem: Physical Therapy Goal    Goal Priority Disciplines Outcome Goal Variances Interventions   Physical Therapy Goal     PT, PT/OT Ongoing, Progressing     Description: Goals to be met by: 20     Patient will increase functional independence with mobility by performin. Supine to sit with Modified [place]  2. Sit to supine with Modified [place]  3. Gait  x 200 feet with Modified [place] using least restrictive device.   4. Lower extremity exercise program x10 reps per handout, with independence                     Time Tracking:     PT Received On: 20  PT Start Time: 904     PT Stop Time: 928  PT Total Time (min): 24 min     Billable Minutes: Gait Training 16 and Therapeutic Activity 8    Treatment Type: Treatment  PT/PTA: PTA     PTA Visit Number: 1     Chip Pineda, PTA  2020

## 2020-08-21 NOTE — PLAN OF CARE
Problem: Occupational Therapy Goal  Goal: Occupational Therapy Goal  Description: Goals to be met by: 9/20/20     Patient will increase functional independence with ADLs by performing:    LE Dressing with Supervision and Assistive Devices as needed.  Grooming while standing with Supervision.  Toileting from toilet with Supervision for hygiene and clothing management.   Supine to sit with Supervision.  Step transfer with Supervision  Toilet transfer to toilet with Supervision.  Increased functional strength to WFL for self care skills and functional mobility .  Upper extremity exercise program x10 reps per handout, with independence.    Outcome: Ongoing, Progressing    Raffy Rutherford Jr. is a 70 y.o. male with a medical diagnosis of Spondylolisthesis at L5-S1 level.  Performance deficits affecting function are weakness, impaired endurance, impaired self care skills, impaired functional mobilty, gait instability, impaired balance, decreased lower extremity function, pain, decreased ROM, impaired skin, orthopedic precautions.  Pt found in bed, agreeable to therapy. He tolerated tx well despite pain in L groin during mobility and LB drsg.   He was able to maintain spinal precautions during all tasks. Continue OT services to address functional goals, progressing as able.  MADONNA Brown

## 2020-08-21 NOTE — PROGRESS NOTES
NEUROSURGICAL POST-OPERATIVE PROGRESS NOTE    DATE OF SERVICE:  08/21/2020      ATTENDING PHYSICIAN:  Jason Caldwell MD    SUBJECTIVE:    INTERIM HISTORY:    This is a very pleasant 70 y.o. y.o. male, who is status post L5-S1 ALIF with standalone interbody that is now POD 1. He complains of abdominal and inguinal pain near the incision. He was able to ambulate in the halls yesterday with PT and OT. Eating and drinking ok. Has blood tinged urine secondary to stent.              OBJECTIVE:    PHYSICAL EXAMINATION:     Neurosurgery Physical Exam  -Alert and oriented x4  -PERRL, EOMI, face symmetrical, tongue midline, facial sensation symmetric, shoulder shrug full strength and symmetric  -Motor: 5/5 throughout except for 3/5 DF of right foot stable from pre op  -decreased sensation in L5 distribution of feet bilaterally stable from pre op    Wound with scant blood on dressing    DIAGNOSTIC DATA:    Lumbar xrays pending    ASSESMENT:    This is a 70 y.o. male who is s/p L5-S1 ALIF    Problem List Items Addressed This Visit        Neuro    DDD (degenerative disc disease), lumbosacral    Relevant Orders    Admit to Inpatient (Completed)       Orthopedic    * (Principal) Spondylolisthesis at L5-S1 level - Primary    Relevant Orders    Transfer patient (Completed)    Vital signs    Neurovascular checks    Intake and output    Place sequential compression device    Chlorohexidine Gluconate Bath    PT evaluate and treat    OT evaluate and treat    Pulse Oximetry Q4H    Incentive spirometry    LSO brace (Completed)    Patient must wear orthosis while sitting or standing    Patient does not have a rubio    Notify Physician    Advance diet as tolerated to Regular diet    CBC auto differential (Completed)    Basic metabolic panel (Completed)       Other    Preoperative testing    Relevant Orders    TYPE AND SCREEN PREOP (Completed)      Other Visit Diagnoses     Fusion of spine, lumbosacral region        Relevant Orders    X-Ray  Lumbar Spine Ap And Lateral          PLAN:  Continue PT OT today out of bed  Pain control as needed  Lumbar xrays pending

## 2020-08-21 NOTE — PLAN OF CARE
Patient is on room air with documented SpO2 and in no apparent distress.  IS is at bedside and patient is performing independently.  Patient states he is not experiencing any shortness of breath.  Will continue to monitor.

## 2020-08-21 NOTE — PLAN OF CARE
AAO,VSS,denies CP or SOB,left groin dressing scant drainage,meds and ivf admin as per order,SCDS to BLE,some tingling reported to RLE,LEE,pt requests few interruptions so plan of care reviewed and pt stated understanding ,call bell in hand,bed alarm set ,instructed to call with needs.

## 2020-08-21 NOTE — PT/OT/SLP PROGRESS
"Physical Therapy Treatment    Patient Name:  Raffy Rutherford Jr.   MRN:  [id number]    Recommendations:     Discharge Recommendations:  outpatient PT   Discharge Equipment Recommendations: walker, rolling   Barriers to discharge: None    Assessment:     Raffy Rutherford Jr. is a 70 y.o. male admitted with a medical diagnosis of Spondylolisthesis at L5-S1 level.  He presents with the following impairments/functional limitations:  weakness, gait instability, impaired endurance, impaired self care skills, impaired functional mobilty, decreased lower extremity function, pain, decreased ROM, orthopedic precautions. Pt and pt's wife requesting to speak with PT regarding pt's AFOs. Pt's wife brought pt's AFOs. Pt with L DF to neutral with normal strength and with weakness to R DF but able to clear feet during ambulation. Spoke with pt about following up with orthotic company as pt reporting ill fitting now that his muscles have atrophied. Recommending OP PT once cleared by MD.    Rehab Prognosis: Good; patient would benefit from acute skilled PT services to address these deficits and reach maximum level of function.    Recent Surgery: Procedure(s) (LRB):  FUSION, SPINE, LUMBAR, ANTERIOR APPROACH L5-S1 ALIF Stand Alone (N/A) 1 Day Post-Op    Plan:     During this hospitalization, patient to be seen daily to address the identified rehab impairments via gait training, therapeutic activities, therapeutic exercises, neuromuscular re-education and progress toward the following goals:    · Plan of Care Expires:  09/20/20    Subjective     Chief Complaint: wanting more than 2 days of OP therapy  Patient/Family Comments/goals: "I hope I can get more therapy"  Pain/Comfort:  · Pain Rating 1: (not rated)      Objective:     Communicated with nurse prior to session.  Patient found being transported back to room in w/c with   upon PT entry to room.     General Precautions: Standard, fall   Orthopedic Precautions:spinal precautions   Braces: " LSO     Functional Mobility:  · Transfers:     · Sit to Stand:  stand by assistance with rolling walker  · Gait: ~5 ft with RW and SBA      AM-PAC 6 CLICK MOBILITY  Turning over in bed (including adjusting bedclothes, sheets and blankets)?: 3  Sitting down on and standing up from a chair with arms (e.g., wheelchair, bedside commode, etc.): 3  Moving from lying on back to sitting on the side of the bed?: 3  Moving to and from a bed to a chair (including a wheelchair)?: 3  Need to walk in hospital room?: 3  Climbing 3-5 steps with a railing?: 2  Basic Mobility Total Score: 17       Therapeutic Activities and Exercises:  Pt and pt's wife requesting to speak with PT regarding pt's AFOs.   Pt's wife brought pt's AFOs. Pt with L DF to neutral with normal strength and with weakness to R DF but able to clear feet during ambulation. Spoke with pt about following up with orthotic company as pt reporting ill fitting now that his muscles have atrophied.   Also spoke with pt and wife regarding OP PT. Pt is very motivated and want more than 2 days of PT/week.    Patient left up in chair with all lines intact, call button in reach and pt;'s wife present..    GOALS:   Multidisciplinary Problems     Physical Therapy Goals        Problem: Physical Therapy Goal    Goal Priority Disciplines Outcome Goal Variances Interventions   Physical Therapy Goal     PT, PT/OT Ongoing, Progressing     Description: Goals to be met by: 20     Patient will increase functional independence with mobility by performin. Supine to sit with Modified [place]  2. Sit to supine with Modified [place]  3. Gait  x 200 feet with Modified [place] using least restrictive device.   4. Lower extremity exercise program x10 reps per handout, with independence                     Time Tracking:     PT Received On: 20  PT Start Time: 1146     PT Stop Time: 1225  PT Total Time (min): 39 min     Billable Minutes: Therapeutic Activity  30    Treatment Type: Treatment  PT/PTA: PT     PTA Visit Number: 0     Nasra Simons, PT  08/21/2020

## 2020-08-22 VITALS
OXYGEN SATURATION: 90 % | HEART RATE: 55 BPM | TEMPERATURE: 98 F | WEIGHT: 172.38 LBS | DIASTOLIC BLOOD PRESSURE: 67 MMHG | RESPIRATION RATE: 16 BRPM | BODY MASS INDEX: 26.13 KG/M2 | HEIGHT: 68 IN | SYSTOLIC BLOOD PRESSURE: 115 MMHG

## 2020-08-22 PROCEDURE — 63600175 PHARM REV CODE 636 W HCPCS: Performed by: NEUROLOGICAL SURGERY

## 2020-08-22 PROCEDURE — 25000003 PHARM REV CODE 250: Performed by: NEUROLOGICAL SURGERY

## 2020-08-22 PROCEDURE — 94761 N-INVAS EAR/PLS OXIMETRY MLT: CPT

## 2020-08-22 PROCEDURE — 97116 GAIT TRAINING THERAPY: CPT | Mod: CQ

## 2020-08-22 PROCEDURE — 97110 THERAPEUTIC EXERCISES: CPT | Mod: CQ

## 2020-08-22 RX ORDER — HYDROMORPHONE HYDROCHLORIDE 2 MG/1
2 TABLET ORAL EVERY 6 HOURS PRN
Qty: 12 TABLET | Refills: 0 | Status: SHIPPED | OUTPATIENT
Start: 2020-08-22 | End: 2020-09-03

## 2020-08-22 RX ORDER — HYDROCODONE BITARTRATE AND ACETAMINOPHEN 10; 325 MG/1; MG/1
1 TABLET ORAL EVERY 6 HOURS PRN
Qty: 28 TABLET | Refills: 0 | Status: SHIPPED | OUTPATIENT
Start: 2020-08-22 | End: 2020-09-30

## 2020-08-22 RX ADMIN — MUPIROCIN: 20 OINTMENT TOPICAL at 08:08

## 2020-08-22 RX ADMIN — POLYETHYLENE GLYCOL 3350 17 G: 17 POWDER, FOR SOLUTION ORAL at 08:08

## 2020-08-22 RX ADMIN — HYDROMORPHONE HYDROCHLORIDE 2 MG: 2 INJECTION, SOLUTION INTRAMUSCULAR; INTRAVENOUS; SUBCUTANEOUS at 12:08

## 2020-08-22 RX ADMIN — LAMOTRIGINE 200 MG: 100 TABLET ORAL at 08:08

## 2020-08-22 RX ADMIN — PREGABALIN 100 MG: 50 CAPSULE ORAL at 08:08

## 2020-08-22 RX ADMIN — CELECOXIB 200 MG: 100 CAPSULE ORAL at 08:08

## 2020-08-22 RX ADMIN — BISACODYL 10 MG: 10 SUPPOSITORY RECTAL at 08:08

## 2020-08-22 RX ADMIN — HYDROMORPHONE HYDROCHLORIDE 2 MG: 2 INJECTION, SOLUTION INTRAMUSCULAR; INTRAVENOUS; SUBCUTANEOUS at 08:08

## 2020-08-22 RX ADMIN — ACETAMINOPHEN 650 MG: 325 TABLET ORAL at 12:08

## 2020-08-22 RX ADMIN — METHOCARBAMOL TABLETS 750 MG: 750 TABLET, COATED ORAL at 08:08

## 2020-08-22 RX ADMIN — HEPARIN SODIUM 5000 UNITS: 5000 INJECTION INTRAVENOUS; SUBCUTANEOUS at 08:08

## 2020-08-22 NOTE — PLAN OF CARE
AAO,VSS,LSO brace in use when up oob ,left groin dressing was saturated and changed beginning of shift , staples underneath were approximated,voiding red urine,scds to ble,still reporting some numbness and tingling to RLE,all ordered meds admin ,care clustered,bed alarm set.

## 2020-08-22 NOTE — PLAN OF CARE
Pt previously independent with ADLs, no HH, has S. Cane, Grab Bars, Bath Bench. Pt lives with wife, Valarie Rutherford. Wife will provide assistance if needed and transportation home on discharge.    Pt denies any needs or concerns at this time. CM numbers written on white board. Pt encouraged to call with any questions or needs. CM will continue to follow patient throughout the transitions of care, and assist with any discharge needs.    Future Appointments   Date Time Provider Department Center   9/2/2020 10:15 AM [facility] ORTHO CLINIC LIMIT 350LBS [facility]   9/2/2020 10:30 AM NURSE SCHEDULE, [facility] NEUROSURGERY [facility] NEUROSU [facility]   9/3/2020  8:15 AM Kimberley Millan MD [facility] NEURO [facility]   9/3/2020 11:15 AM Katelynn George MD [facility] UROLOGY [facility]   9/30/2020  4:15 PM [facility] ORTHO CLINIC LIMIT 350LBS [facility]RAY [facility]   9/30/2020  4:30 PM Jason Caldwell MD [facility] NEUROSU [facility]        08/21/20 1630   Discharge Assessment   Assessment Type Discharge Planning Assessment   Confirmed/corrected address and phone number on facesheet? Yes   Assessment information obtained from? Patient;Caregiver  (Pt's wife, Valarie Rutherford [phone])   Expected Length of Stay (days) 2   Communicated expected length of stay with patient/caregiver yes   Prior to hospitilization cognitive status: Alert/Oriented   Prior to hospitalization functional status: Independent   Current cognitive status: Alert/Oriented   Current Functional Status: Needs Assistance   Facility Arrived From: (home)   Lives With spouse  (Pt's wifeValarie [phone])   Is patient able to care for self after discharge? Yes   Who are your caregiver(s) and their phone number(s)? Pt's wife, Valarie Rutherford [phone]   Patient's perception of discharge disposition home or selfcare   Readmission Within the Last 30 Days no previous admission in last 30 days   Patient currently being followed by outpatient case management? No   Patient  currently receives any other outside agency services? No   Equipment Currently Used at Home cane, straight;grab bar;bath bench   Do you have any problems affording any of your prescribed medications? No   Is the patient taking medications as prescribed? yes   Does the patient have transportation home? Yes  (Pt's wife, Valarie Rutherford [phone])   Transportation Anticipated family or friend will provide   Dialysis Name and Scheduled days N/A   Does the patient receive services at the [facility]? No   Discharge Plan A Home   Discharge Plan B Home with family   DME Needed Upon Discharge  walker, rolling   Patient/Family in Agreement with Plan yes     Sheela Nation, RN Transitional Navigator  [phone]

## 2020-08-22 NOTE — PT/OT/SLP PROGRESS
"Physical Therapy Treatment    Patient Name:  Raffy Rutherford Jr.   MRN:  [id number]    Recommendations:     Discharge Recommendations:  outpatient PT once cleared by MD  Discharge Equipment Recommendations: walker, rolling   Barriers to discharge: None    Assessment:     Raffy Rutherford Jr. is a 70 y.o. male admitted with a medical diagnosis of Spondylolisthesis at L5-S1 level.  He presents with the following impairments/functional limitations:  weakness, impaired endurance, impaired self care skills, impaired functional mobilty, gait instability, impaired balance, decreased coordination, decreased upper extremity function, decreased lower extremity function, decreased ROM, orthopedic precautions. Pt able to perform ambulation training ~130 ft with RW, LSO donned, and requiring close SBA. Stated he was experiencing a "burning" feeling around his left groin mainly when getting up from a surface.    Rehab Prognosis: Good; patient would benefit from acute skilled PT services to address these deficits and reach maximum level of function.    Recent Surgery: Procedure(s) (LRB):  FUSION, SPINE, LUMBAR, ANTERIOR APPROACH L5-S1 ALIF Stand Alone (N/A) 2 Days Post-Op    Plan:     During this hospitalization, patient to be seen daily to address the identified rehab impairments via gait training, therapeutic activities, therapeutic exercises, neuromuscular re-education and progress toward the following goals:    · Plan of Care Expires:  09/20/20    Subjective     Chief Complaint: None Expressed  Patient/Family Comments/goals: "I am going home today".  Pain/Comfort:  · Pain Rating 1: (Did not rate)  · Location - Side 1: Left  · Location - Orientation 1: generalized  · Location 1: groin  · Pain Addressed 1: Pre-medicate for activity  · Pain Rating Post-Intervention 1: (Did not rate)      Objective:     Communicated with nurse prior to session.  Patient found up in chair with telemetry upon PT entry to room.     General Precautions: " Standard, fall   Orthopedic Precautions:spinal precautions   Braces: LSO     Functional Mobility:  · Transfers:     · Sit to Stand:  supervision and stand by assistance with rolling walker  · Gait: ~130 ft with RW, LSO donned, requiring close SBA.      AM-PAC 6 CLICK MOBILITY          Therapeutic Activities and Exercises:   Pt performed seated therapeutic exercises 1 x 10 reps BLE's consisting of hip add/abd and LAQ's only. Also, Instructed in and performed glut isometrics with an approximate 3 second hold. Ambulation training ~130 ft with RW, LSO donned, requiring close SBA. Pt demonstrates a slow sudeep and demonstrates an increased hip(slight increase) and knee flexion(increased flexion at knee and slight at hip on RLE during swing phase. Complained of left groin 'burn' while performing sit to stand as well as after therapeutic exercises.    Patient left up in chair with all lines intact, call button in reach,  nurse notified and  wife present..    GOALS:   Multidisciplinary Problems     Physical Therapy Goals        Problem: Physical Therapy Goal    Goal Priority Disciplines Outcome Goal Variances Interventions   Physical Therapy Goal     PT, PT/OT Ongoing, Progressing     Description: Goals to be met by: 20     Patient will increase functional independence with mobility by performin. Supine to sit with Modified [place]  2. Sit to supine with Modified [place]  3. Gait  x 200 feet with Modified [place] using least restrictive device.   4. Lower extremity exercise program x10 reps per handout, with independence                     Time Tracking:     PT Received On: 20  PT Start Time: 1205     PT Stop Time: 1233  PT Total Time (min): 28 min     Billable Minutes: Gait Training  13 and Therapeutic Exercise  15    Treatment Type: Treatment  PT/PTA: PTA     PTA Visit Number: 1     Jany Mullen, PTA  2020

## 2020-08-22 NOTE — PLAN OF CARE
Problem: Physical Therapy Goal  Goal: Physical Therapy Goal  Description: Goals to be met by: 20     Patient will increase functional independence with mobility by performin. Supine to sit with Modified [place]  2. Sit to supine with Modified [place]  3. Gait  x 200 feet with Modified [place] using least restrictive device.   4. Lower extremity exercise program x10 reps per handout, with independence    Outcome: Ongoing, Progressing   Pt continues to work and progress toward all established goals. Able to perform ambulation training ~130 ft with RW, LSO donned, requiring close SBA.

## 2020-08-22 NOTE — PLAN OF CARE
No HH ordered    DME: RW ordered. Pulled from [employer], Ok given to pull from [facility], Jennifer. Delivered bedside to pt on Friday 8/21/20 by Sylvester CHANEL.    Future Appointments   Date Time Provider Department Center   9/2/2020 10:15 AM [facility] ORTHO CLINIC LIMIT 350LBS [facility] ORAY [facility]   9/2/2020 10:30 AM NURSE SCHEDULE, [facility] NEUROSURGERY [facility] NEUROSU [facility]   9/3/2020  8:15 AM Kimberley Millan MD [facility]URO [facility]   9/3/2020 11:15 AM Katelynn George MD [facility] UROLOGY [facility]   9/30/2020  4:15 PM [facility]THO CLINIC LIMIT 350LBS [facility]   9/30/2020  4:30 PM Jason Caldwell MD [facility] NEUROSU [facility]       Pt's nurse will go over medications/signs and symptoms prior to discharge    Pt's wife will provide transportation home       08/22/20 1122   Final Note   Assessment Type Final Discharge Note   Anticipated Discharge Disposition Home   What phone number can be called within the next 1-3 days to see how you are doing after discharge? [phone]   Hospital Follow Up  Appt(s) scheduled? No  (Offices closed for weekend. Patient to schedule own follow up appointment.)   Right [facility] Referral Info   Post Acute Recommendation No Care     Sheela Nation, RN Transitional Navigator  [phone]

## 2020-08-22 NOTE — PROGRESS NOTES
NEUROSURGICAL POST-OPERATIVE PROGRESS NOTE    DATE OF SERVICE:  08/22/2020      ATTENDING PHYSICIAN:  Jason Caldwell MD    SUBJECTIVE:    INTERIM HISTORY:    This is a very pleasant 70 y.o. y.o. male, who is status day 2 L5-S1 anterior interbody fusion.  Doing well.  Mobilizing out of bed without assistance.  Voiding well.  Hematuria expected.  No bowel movement yet.  Increased range of motion right foot.  Increased tingling right foot              OBJECTIVE:    PHYSICAL EXAMINATION:   Vitals:    08/22/20 0838   BP:    Pulse:    Resp: 16   Temp:        Neurosurgery Physical Exam    Ortho Exam    Neurologic Exam    Incision clean dry on inspection    DIAGNOSTIC DATA:    X-ray of the lumbar spine, AP and lateral views reveals the fusion hardware is intact. No loosening of screws or migration of hardware.    ASSESMENT:    This is a 70 y.o. male who is s/p L5-S1 anterior interbody fusion with excellent outcome.    Problem List Items Addressed This Visit        Neuro    DDD (degenerative disc disease), lumbosacral    Relevant Orders    Admit to Inpatient (Completed)    WALKER FOR HOME USE    DISCHARGE PATIENT ATTENTION: Patient requires surgical mask upon discharge.       Orthopedic    * (Principal) Spondylolisthesis at L5-S1 level - Primary    Relevant Orders    Transfer patient (Completed)    Vital signs    Neurovascular checks    Intake and output    Place sequential compression device    Chlorohexidine Gluconate Bath    PT evaluate and treat    OT evaluate and treat    Pulse Oximetry Q4H    Incentive spirometry    LSO brace (Completed)    Patient must wear orthosis while sitting or standing    Patient does not have a rubio    Notify Physician    Advance diet as tolerated to Regular diet    CBC auto differential (Completed)    Basic metabolic panel (Completed)       Other    Preoperative testing    Relevant Orders    TYPE AND SCREEN PREOP (Completed)      Other Visit Diagnoses     Fusion of spine, lumbosacral region         Relevant Orders    X-Ray Lumbar Spine Ap And Lateral (Completed)          PLAN:    Follow-up in 2 weeks  See discharge instruction and prescriptions  All questions answered        Jason Caldwell MD  Pager: [phone]

## 2020-08-22 NOTE — DISCHARGE INSTRUCTIONS
Remain active with walking and other light activities daily.  No heavy lifting, no extreme bending or twisting.   Wear low back brace when out of bed  No driving for 2 weeks  Okay to shower today. Remove dressing today and leave incision air dry

## 2020-08-24 NOTE — DISCHARGE SUMMARY
[facility]  Neurosurgery  Discharge Summary      Patient Name: Raffy Rutherford Jr.  MRN: [id number]  Admission Date: 8/20/2020  Hospital Length of Stay: 2 days  Discharge Date and Time: 8/22/2020  1:07 PM  Attending Physician: No att. providers found   Discharging Provider: Jason Caldwell MD  Primary Care Provider: Nini Rendon MD     HPI:This is a 70-year-old  male, status post L3-5 direct lateral interbody fusion and posterior instrumentation, L5-S1 degenerative disc disease, status post L5-S1 microdiskectomy, chronic instability with development of L5-S1 spondylolisthesis and severe bilateral foraminal stenosis with bilateral footdrop.  Risks included paralysis, leg pain, low back pain, CSF leak, ureter injury, great vessels injury, screw malpositioning, hardware failure or migration, reoperation, medical complications such as MI, pneumonia and death. The patient consented for surgery.    Procedure(s) (LRB):  FUSION, SPINE, LUMBAR, ANTERIOR APPROACH L5-S1 ALIF Stand Alone (N/A)     Hospital Course:  Surgery was uncomplicated postoperative course uneventful.  Patient was able to mobilize without assistance.  Patient able to void his bladder without difficulty.  Patient was discharged home on postop day 2.  No new onset of motor weakness or numbness.  His right lower extremity range of motion in dorsiflexion improved after surgery.    Consults:  physical therapy/occupational therapy    Significant Diagnostic Studies:  Postoperative lumbar x-ray showed good positioning of the spacer L5-S1    Pending Diagnostic Studies:     None        Final Active Diagnoses:    Diagnosis Date Noted POA    PRINCIPAL PROBLEM:  Spondylolisthesis at L5-S1 level [M43.17] 08/20/2020 Not Applicable    Preoperative testing [Z01.818] 08/20/2020 Not Applicable    DDD (degenerative disc disease), lumbosacral [M51.37] 08/19/2020 Yes      Problems Resolved During this Admission:      Discharged Condition:  "good    Disposition: Home or Self Care    Follow Up:  Follow-up Information     Schedule an appointment as soon as possible for a visit with Jason Caldwell MD.    Specialty: Neurosurgery  Why: Follow-Up / Offices closed for weekend. Patient to schedule own follow up appointment.  Contact information:  [address]  [address]  [place] [address]  [phone]             [facility]    Specialty: DME Provider  Why: DME: Rolling Walker Provider  Contact information:  [address]  [facility]  [facility] [address]  [phone]                 Patient Instructions:      WALKER FOR HOME USE     Order Specific Question Answer Comments   Type of Walker: Adult (5'4"-6'6")    With wheels? Yes    Height: 5' 8" (1.727 m)    Weight: 78.2 kg (172 lb 6.4 oz)    Length of need (1-99 months): 1    Does patient have medical equipment at home? cane, straight    Does patient have medical equipment at home? shower chair    Does patient have medical equipment at home? walker, rolling    Please check all that apply: Patient's condition impairs ambulation.      TYPE AND SCREEN PREOP   Standing Status: Future Number of Occurrences: 1 Standing Exp. Date: 10/09/21     Medications:  Reconciled Home Medications:      Medication List      START taking these medications    HYDROcodone-acetaminophen  mg per tablet  Commonly known as: NORCO  Take 1 tablet by mouth every 6 (six) hours as needed for Pain.  Replaces: HYDROcodone-acetaminophen 5-325 mg per tablet     HYDROmorphone 2 MG tablet  Commonly known as: DILAUDID  Take 1 tablet (2 mg total) by mouth every 6 (six) hours as needed for Pain.        CHANGE how you take these medications    ubrogepant 50 mg tablet  Generic drug: ubrogepant  Take 1 tablet (50 mg total) by mouth as needed for Migraine.  What changed: when to take this        CONTINUE taking these medications    butalbital-acetaminophen-caffeine -40 mg -40 mg per tablet  Commonly known as: FIORICET, " ESGIC  Take 1 tablet by mouth 6 hours as needed for Headaches.     clonazePAM 0.5 MG tablet  Commonly known as: KLONOPIN  Take 2 tablets by mouth at bedtime and 1 tablet as needed for tinnitus     cyanocobalamin 1,000 mcg/mL injection     diclofenac sodium 1 % Gel  Commonly known as: VOLTAREN  Apply 2 g topically once daily.     EMSAM 12 mg/24 hr  Generic drug: selegiline  Place 1 new patch onto the skin once daily.     lamoTRIgine 200 MG tablet  Commonly known as: LAMICTAL  Take 1 tablet (200 mg total) by mouth once daily.     methocarbamoL 750 MG Tab  Commonly known as: ROBAXIN  Take 1 tablet (750 mg total) by mouth 3 (three) times daily as needed.     pravastatin 40 MG tablet  Commonly known as: PRAVACHOL  Take 1 tablet (40 mg total) by mouth once daily.     pregabalin 50 MG capsule  Commonly known as: LYRICA  Take 1 capsule (50 mg total) by mouth 2 (two) times daily.     RABEprazole 20 mg tablet  Commonly known as: ACIPHEX  Take 1 tablet (20 mg total) by mouth 2 (two) times daily.     senna-docusate 8.6-50 mg 8.6-50 mg per tablet  Commonly known as: PERICOLACE  Take 2 tablets by mouth nightly as needed for Constipation.     sucralfate 1 gram tablet  Commonly known as: CARAFATE  TAKE 1 TABLET BY MOUTH 4 TIMES A DAY     tadalafiL 5 MG tablet  Commonly known as: CIALIS  Take 1 tablet (5 mg total) by mouth once daily.     traZODone 100 MG tablet  Commonly known as: DESYREL  Take 1 tablet (100 mg total) by mouth every evening.        STOP taking these medications    HYDROcodone-acetaminophen 5-325 mg per tablet  Commonly known as: NORCO  Replaced by: HYDROcodone-acetaminophen  mg per tablet            Jason Caldwell MD  Neurosurgery  [facility]

## 2020-09-01 ENCOUNTER — PATIENT OUTREACH (OUTPATIENT)
Dept: ADMINISTRATIVE | Facility: OTHER | Age: 70
End: 2020-09-01

## 2020-09-01 NOTE — PROGRESS NOTES
Health Maintenance Due   Topic Date Due    Sign Pain Contract  [birth year]/1970    Naloxone Prescription  04/12/1970    Shingles Vaccine (2 of 3) 11/28/2012    Urine Drug Screen  12/06/2013    Influenza Vaccine (1) 09/01/2020     Updates were requested from care everywhere.  Chart was reviewed for overdue Proactive [facility]ters ([facility]) topics (CRS, Breast Cancer Screening, Eye exam)  Health Maintenance has been updated.  LINKS immunization registry triggered.  Immunizations were reconciled.

## 2020-09-02 ENCOUNTER — HOSPITAL ENCOUNTER (OUTPATIENT)
Dept: RADIOLOGY | Facility: HOSPITAL | Age: 70
Discharge: HOME | End: 2020-09-02
Attending: NEUROLOGICAL SURGERY
Payer: COMMERCIAL

## 2020-09-02 ENCOUNTER — CLINICAL SUPPORT (OUTPATIENT)
Dept: NEUROSURGERY | Facility: CLINIC | Age: 70
End: 2020-09-02
Payer: COMMERCIAL

## 2020-09-02 VITALS — SYSTOLIC BLOOD PRESSURE: 106 MMHG | HEART RATE: 63 BPM | DIASTOLIC BLOOD PRESSURE: 68 MMHG

## 2020-09-02 DIAGNOSIS — M43.27 FUSION OF SPINE, LUMBOSACRAL REGION: ICD-10-CM

## 2020-09-02 PROCEDURE — 99999 PR PBB SHADOW E&M-EST. PATIENT-LVL III: ICD-10-PCS | Mod: PBBFAC,,,

## 2020-09-02 PROCEDURE — 99999 PR PBB SHADOW E&M-EST. PATIENT-LVL III: CPT | Mod: PBBFAC,,,

## 2020-09-02 PROCEDURE — 72100 X-RAY EXAM L-S SPINE 2/3 VWS: CPT | Mod: TC,PN

## 2020-09-02 PROCEDURE — 72100 X-RAY EXAM L-S SPINE 2/3 VWS: CPT | Mod: 26,,, | Performed by: RADIOLOGY

## 2020-09-02 PROCEDURE — 72100 XR LUMBAR SPINE AP AND LATERAL: ICD-10-PCS | Mod: 26,,, | Performed by: RADIOLOGY

## 2020-09-03 ENCOUNTER — LAB VISIT (OUTPATIENT)
Dept: LAB | Facility: OTHER | Age: 70
End: 2020-09-03
Attending: PSYCHIATRY & NEUROLOGY
Payer: COMMERCIAL

## 2020-09-03 ENCOUNTER — PROCEDURE VISIT (OUTPATIENT)
Dept: UROLOGY | Facility: CLINIC | Age: 70
End: 2020-09-03
Payer: COMMERCIAL

## 2020-09-03 ENCOUNTER — TELEPHONE (OUTPATIENT)
Dept: OPHTHALMOLOGY | Facility: CLINIC | Age: 70
End: 2020-09-03

## 2020-09-03 ENCOUNTER — OFFICE VISIT (OUTPATIENT)
Dept: NEUROLOGY | Facility: CLINIC | Age: 70
End: 2020-09-03
Payer: COMMERCIAL

## 2020-09-03 VITALS
WEIGHT: 176.56 LBS | DIASTOLIC BLOOD PRESSURE: 63 MMHG | SYSTOLIC BLOOD PRESSURE: 113 MMHG | HEART RATE: 54 BPM | BODY MASS INDEX: 26.76 KG/M2 | HEIGHT: 68 IN

## 2020-09-03 VITALS
SYSTOLIC BLOOD PRESSURE: 119 MMHG | WEIGHT: 176.56 LBS | BODY MASS INDEX: 26.85 KG/M2 | HEART RATE: 60 BPM | DIASTOLIC BLOOD PRESSURE: 74 MMHG | TEMPERATURE: 98 F

## 2020-09-03 DIAGNOSIS — G50.0 TRIGEMINAL NEURALGIA OF LEFT SIDE OF FACE: Primary | ICD-10-CM

## 2020-09-03 DIAGNOSIS — S22.080S COMPRESSION FRACTURE OF T12 VERTEBRA, SEQUELA: ICD-10-CM

## 2020-09-03 DIAGNOSIS — R73.03 PREDIABETES: ICD-10-CM

## 2020-09-03 DIAGNOSIS — G60.3 IDIOPATHIC PROGRESSIVE POLYNEUROPATHY: ICD-10-CM

## 2020-09-03 DIAGNOSIS — M51.37 DDD (DEGENERATIVE DISC DISEASE), LUMBOSACRAL: ICD-10-CM

## 2020-09-03 DIAGNOSIS — H53.8 BLURRED VISION, RIGHT EYE: ICD-10-CM

## 2020-09-03 LAB
25(OH)D3+25(OH)D2 SERPL-MCNC: 54 NG/ML (ref 30–96)
ALBUMIN SERPL BCP-MCNC: 3.9 G/DL (ref 3.5–5.2)
ALP SERPL-CCNC: 80 U/L (ref 55–135)
ALT SERPL W/O P-5'-P-CCNC: 34 U/L (ref 10–44)
ANION GAP SERPL CALC-SCNC: 9 MMOL/L (ref 8–16)
AST SERPL-CCNC: 25 U/L (ref 10–40)
BASOPHILS # BLD AUTO: 0.04 K/UL (ref 0–0.2)
BASOPHILS NFR BLD: 0.6 % (ref 0–1.9)
BILIRUB SERPL-MCNC: 0.4 MG/DL (ref 0.1–1)
BUN SERPL-MCNC: 14 MG/DL (ref 8–23)
CALCIUM SERPL-MCNC: 9.5 MG/DL (ref 8.7–10.5)
CHLORIDE SERPL-SCNC: 101 MMOL/L (ref 95–110)
CO2 SERPL-SCNC: 27 MMOL/L (ref 23–29)
CREAT SERPL-MCNC: 0.7 MG/DL (ref 0.5–1.4)
DIFFERENTIAL METHOD: ABNORMAL
EOSINOPHIL # BLD AUTO: 0.1 K/UL (ref 0–0.5)
EOSINOPHIL NFR BLD: 1.9 % (ref 0–8)
ERYTHROCYTE [DISTWIDTH] IN BLOOD BY AUTOMATED COUNT: 15 % (ref 11.5–14.5)
ERYTHROCYTE [SEDIMENTATION RATE] IN BLOOD: 17 MM/HR (ref 0–10)
EST. GFR  (AFRICAN AMERICAN): >60 ML/MIN/1.73 M^2
EST. GFR  (NON AFRICAN AMERICAN): >60 ML/MIN/1.73 M^2
ESTIMATED AVG GLUCOSE: 114 MG/DL (ref 68–131)
FOLATE SERPL-MCNC: 16.2 NG/ML (ref 4–24)
GLUCOSE SERPL-MCNC: 92 MG/DL (ref 70–110)
HBA1C MFR BLD HPLC: 5.6 % (ref 4–5.6)
HCT VFR BLD AUTO: 44.5 % (ref 40–54)
HGB BLD-MCNC: 14.2 G/DL (ref 14–18)
IMM GRANULOCYTES # BLD AUTO: 0.04 K/UL (ref 0–0.04)
IMM GRANULOCYTES NFR BLD AUTO: 0.6 % (ref 0–0.5)
LYMPHOCYTES # BLD AUTO: 2 K/UL (ref 1–4.8)
LYMPHOCYTES NFR BLD: 31.2 % (ref 18–48)
MCH RBC QN AUTO: 29.3 PG (ref 27–31)
MCHC RBC AUTO-ENTMCNC: 31.9 G/DL (ref 32–36)
MCV RBC AUTO: 92 FL (ref 82–98)
MISCELLANEOUS TEST NAME: NORMAL
MONOCYTES # BLD AUTO: 0.5 K/UL (ref 0.3–1)
MONOCYTES NFR BLD: 7.1 % (ref 4–15)
NEUTROPHILS # BLD AUTO: 3.8 K/UL (ref 1.8–7.7)
NEUTROPHILS NFR BLD: 58.6 % (ref 38–73)
NRBC BLD-RTO: 0 /100 WBC
PLATELET # BLD AUTO: 343 K/UL (ref 150–350)
PMV BLD AUTO: 9 FL (ref 9.2–12.9)
POTASSIUM SERPL-SCNC: 4.3 MMOL/L (ref 3.5–5.1)
PROT SERPL-MCNC: 7.2 G/DL (ref 6–8.4)
RBC # BLD AUTO: 4.84 M/UL (ref 4.6–6.2)
RPR SER QL: NORMAL
SODIUM SERPL-SCNC: 137 MMOL/L (ref 136–145)
TSH SERPL DL<=0.005 MIU/L-ACNC: 0.77 UIU/ML (ref 0.4–4)
VIT B12 SERPL-MCNC: 579 PG/ML (ref 210–950)
WBC # BLD AUTO: 6.44 K/UL (ref 3.9–12.7)

## 2020-09-03 PROCEDURE — 86592 SYPHILIS TEST NON-TREP QUAL: CPT

## 2020-09-03 PROCEDURE — 80053 COMPREHEN METABOLIC PANEL: CPT

## 2020-09-03 PROCEDURE — 84165 PROTEIN E-PHORESIS SERUM: CPT | Mod: 26,,, | Performed by: PATHOLOGY

## 2020-09-03 PROCEDURE — 36415 COLL VENOUS BLD VENIPUNCTURE: CPT

## 2020-09-03 PROCEDURE — 99215 PR OFFICE/OUTPT VISIT, EST, LEVL V, 40-54 MIN: ICD-10-PCS | Mod: S$GLB,,, | Performed by: PSYCHIATRY & NEUROLOGY

## 2020-09-03 PROCEDURE — 1125F AMNT PAIN NOTED PAIN PRSNT: CPT | Mod: S$GLB,,, | Performed by: PSYCHIATRY & NEUROLOGY

## 2020-09-03 PROCEDURE — 82525 ASSAY OF COPPER: CPT

## 2020-09-03 PROCEDURE — 1101F PT FALLS ASSESS-DOCD LE1/YR: CPT | Mod: CPTII,S$GLB,, | Performed by: PSYCHIATRY & NEUROLOGY

## 2020-09-03 PROCEDURE — 99215 OFFICE O/P EST HI 40 MIN: CPT | Mod: S$GLB,,, | Performed by: PSYCHIATRY & NEUROLOGY

## 2020-09-03 PROCEDURE — 3008F BODY MASS INDEX DOCD: CPT | Mod: CPTII,S$GLB,, | Performed by: PSYCHIATRY & NEUROLOGY

## 2020-09-03 PROCEDURE — 99999 PR PBB SHADOW E&M-EST. PATIENT-LVL V: ICD-10-PCS | Mod: PBBFAC,,, | Performed by: PSYCHIATRY & NEUROLOGY

## 2020-09-03 PROCEDURE — 84425 ASSAY OF VITAMIN B-1: CPT

## 2020-09-03 PROCEDURE — 82607 VITAMIN B-12: CPT

## 2020-09-03 PROCEDURE — 84165 PATHOLOGIST INTERPRETATION SPE: ICD-10-PCS | Mod: 26,,, | Performed by: PATHOLOGY

## 2020-09-03 PROCEDURE — 84255 ASSAY OF SELENIUM: CPT

## 2020-09-03 PROCEDURE — 99999 PR PBB SHADOW E&M-EST. PATIENT-LVL V: CPT | Mod: PBBFAC,,, | Performed by: PSYCHIATRY & NEUROLOGY

## 2020-09-03 PROCEDURE — 82306 VITAMIN D 25 HYDROXY: CPT

## 2020-09-03 PROCEDURE — 86334 PATHOLOGIST INTERPRETATION IFE: ICD-10-PCS | Mod: 26,,, | Performed by: PATHOLOGY

## 2020-09-03 PROCEDURE — 86334 IMMUNOFIX E-PHORESIS SERUM: CPT

## 2020-09-03 PROCEDURE — 84207 ASSAY OF VITAMIN B-6: CPT

## 2020-09-03 PROCEDURE — 86334 IMMUNOFIX E-PHORESIS SERUM: CPT | Mod: 26,,, | Performed by: PATHOLOGY

## 2020-09-03 PROCEDURE — 82746 ASSAY OF FOLIC ACID SERUM: CPT

## 2020-09-03 PROCEDURE — 84165 PROTEIN E-PHORESIS SERUM: CPT

## 2020-09-03 PROCEDURE — 83036 HEMOGLOBIN GLYCOSYLATED A1C: CPT

## 2020-09-03 PROCEDURE — 83520 IMMUNOASSAY QUANT NOS NONAB: CPT | Mod: 59

## 2020-09-03 PROCEDURE — 1159F PR MEDICATION LIST DOCUMENTED IN MEDICAL RECORD: ICD-10-PCS | Mod: S$GLB,,, | Performed by: PSYCHIATRY & NEUROLOGY

## 2020-09-03 PROCEDURE — 3008F PR BODY MASS INDEX (BMI) DOCUMENTED: ICD-10-PCS | Mod: CPTII,S$GLB,, | Performed by: PSYCHIATRY & NEUROLOGY

## 2020-09-03 PROCEDURE — 85025 COMPLETE CBC W/AUTO DIFF WBC: CPT

## 2020-09-03 PROCEDURE — 84443 ASSAY THYROID STIM HORMONE: CPT

## 2020-09-03 PROCEDURE — 52310 CYSTOSCOPY AND TREATMENT: CPT | Mod: S$GLB,,, | Performed by: STUDENT IN AN ORGANIZED HEALTH CARE EDUCATION/TRAINING PROGRAM

## 2020-09-03 PROCEDURE — 85651 RBC SED RATE NONAUTOMATED: CPT

## 2020-09-03 PROCEDURE — 1101F PR PT FALLS ASSESS DOC 0-1 FALLS W/OUT INJ PAST YR: ICD-10-PCS | Mod: CPTII,S$GLB,, | Performed by: PSYCHIATRY & NEUROLOGY

## 2020-09-03 PROCEDURE — 1125F PR PAIN SEVERITY QUANTIFIED, PAIN PRESENT: ICD-10-PCS | Mod: S$GLB,,, | Performed by: PSYCHIATRY & NEUROLOGY

## 2020-09-03 PROCEDURE — 1159F MED LIST DOCD IN RCRD: CPT | Mod: S$GLB,,, | Performed by: PSYCHIATRY & NEUROLOGY

## 2020-09-03 PROCEDURE — 52310 PR CYSTOSCOPY,REMV CALCULUS,SIMPLE: ICD-10-PCS | Mod: S$GLB,,, | Performed by: STUDENT IN AN ORGANIZED HEALTH CARE EDUCATION/TRAINING PROGRAM

## 2020-09-03 RX ORDER — DICLOFENAC POTASSIUM 50 MG/1
50 POWDER, FOR SOLUTION ORAL DAILY PRN
Qty: 9 EACH | Refills: 3 | Status: SHIPPED | OUTPATIENT
Start: 2020-09-03 | End: 2020-12-07

## 2020-09-03 NOTE — PROGRESS NOTES
"    NEUROLOGY  Outpatient Follow Up    [address] [address]  [phone] (office) / [phone] ( (fax)    Patient Name:  Raffy Rutherford Jr.  :  1950  MR #:  [id number]  Acct #:  [id number]    Date of Neurology Visit: 2020  Name of Neurologist: Kimberley Millan MD    Other Physicians:  Nini Rendon MD (Primary Care Physician); Jason Caldwell MD (Referring)      Chief Complaint:  Neuropathy in the legs and severe headache      History of Present Illness (HPI):  Raffy Rutherford Jr. is a 70 y.o. male with history of chronic bilateral lower extremity pain, low back pain, motor neuropathy who presented to the clinic today for his initial visit with me. He additionally complains of severe disabling headaches    The patient recently underwent L3-L5 direct lateral interbody fusion and posterior instrumentation, L5-S1 degenerative disc disease, status post L5-S1 micro diskectomy.      History of neuropathy:  This may have started around  and then this intensified since then. He had sciaticca  Initially which is pain in the back of his thigh which goes into his ankle, mainly on the right side but can happen on the left.     Constant neuropathy in his right lower extremity, intermittent in the left leg and intermittent neuropathy in his left > right hand.   He describes "neuropathy" as calf weakness, pain from his shin goes into his toes, he can move his big toe but not his other toes. The pain is described as pins and needles, burning, ice cold at times, 6-8/10 in intensity.   Now the pain has moved up to his calves but this has been very    Also has constant low back pain which is between 5-8/10 in intensity. He takes Hydrocodone, Robaxin, sometimes Advil, Lyrica, wine late at night helps. Changing positions helps, ice helps significantly. Since his surgery, low back pain and gait is better.    He also has enodrses tingling involving both his feet from his " ankles into toes.     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    He had neck pain in the past which has improved.     He developed right foot drop 6 years ago and also a left foot drop subsequently which worsened initially and then stabilised.     Duration:  At least 8 years  Location:  Bilateral lower extremities  Intensity:  Moderate to severe  Aggravating factors:  Standing or sitting for prolonged periods of time  Relieving factors:  Changes in position or medication  Frequency:  Daily  Timing:  All day and constant in the right lower extremity and intermittent in the left lower extremity  Associated symptoms:  Bilateral foot drop      Per discharge summary dated 08/24/2020- the patient underwent L3-L5 direct lateral interbody fusion and posterior instrumentation, L5-S1 degenerative disc disease, status post L5-S1 micro diskectomy.    He was seen by Dr. Jason Brewer for evaluation of her 20 year history of pain over the left side of face, right leg pain and right foot burning.  Per his notes, EMG on 08/05/2014 revealed reduced motor compound motor action potential amplitudes and preserved sensory responses.  There was no evidence of conduction block per notes.  Concentric needle examination revealed chronic and some acute changes  Repeat EMG on 05/04/2015 by Dr. Cho revealed absent tibial motor amplitude, very low peroneal motor amplitude ( delayed onset at 7.7 milliseconds), intact right sural and superficial peroneal amplitudes with slightly delayed distal peak latency.  Chronic denervation changes were noted from the vastus medialis, extensor hallucis longus, tibialis anterior, tibialis posterior, gastrocnemius and short head of biceps femoris.        Headache:  He has had headaches since he was a teenager. He has had a tooth  removal, occipital nerve block, Trigeminal nerve block and has tried Emgality x2 without benefit. He has lived with his headache since he was 21 years old. He states they have been about the same in intesneity and frequency since he was 21. Last year he had disc extrusion for which his narcotics were increased and his headaches improved with it.    Type: iron hot stab like pain   Severity: 9/10  Location: left eye, forehead , cheek into left shoulder and left upper back   Triggers: Particular scents, looking at screen for a long time   Frequency: 7-8/ 10  Duration:  Days if he does not take medication (2-3 days) can have a cycles of headaches for several weeks , sometimes can go 4 weeks without headache   Aura: none   Photophobia: not as much but does endorse turning the lights off or worsening of headache when he looks at his phone  Phonophobia: ++  Nausea/ vomiting: rarely gets nauseous   Aggravating factors: none   Relieving factors: Medications  Inhales an oil- my dab which has given him significant benefit  Fioricet- 2 -3 pills/ day - would do this for 3 days at most  Ubrelvy usually has taken only 150 mg pill for the day and if he continued to have headaches has then taken Fioricet  Excedrin can help but makes tinnitus worse  Aspirin makes tinnitus worse   Previously failed therapies:  Depakote  Topamax  Imipramine  Elavil  Wellbutrin  Prozac  Zoloft  Paxil  Celexa  Lithium  Soma  Baclofen  Tylenol  Celebrex  Autonomic symptoms: left eye becomes slightky droopy  Activity during headache: would lay down for 20 minutes in the dark  Smoking: Never  Sleep: 7 hours   Caffeine: 2 cups of coffee/ day      He is on Hydrocodone 3 pills/ day x 5 years   He breaks his Robaxin in half usually     In addition the patient was seen by Dr. Meadows and Dr. Blackwood in the past for headache.  He had the occipital nerve block with minimal to no benefit    Brother has footdrop but also has diabetes  Sister may have similar  complaints    Treatment to date:     For back pain he takes half a pill of hydrocodone 2 times a day  He also breaks is Robaxin and in half and takes it as needed  He is on Lyrica for back pain but is unsure if it truly helps him    For migraine  He is not on any prophylactics  He reports trying Emgality x 2 in the past    For migraine abortive:  My dad oil- inhaling this helps significantly  If not then he takes Fioricet 2-3 tablets per day and usually the headache resolves  He has tried you breath LV with some benefit but the headache usually does not resolved completely    Review of Systems:    General: Weight gain: No, Weight Loss: No, Fatigue: Yes,   Fever: No, Chills: No, Night Sweats: No, Insomnia: No, Excessive sleeping: No   Respiratory:  Cough: No, Shortness of Breath: No,   Wheezing: No, Excessive Snoring: No, Coughing up blood: No  Endocrine: Heat Intolerance: No, Cold Intolerance: No,   Excessive Thirst: No, Excessive Hunger: No,   Eyes:  Blurred Vision: No, Double Vision: No,   Light Sensitivity: Yes, Eye pain: Yes  Musculoskeletal: Muscle Aches/Pain: Yes, Joint Pain/Swelling: Yes, Muscle Cramps: No, Muscle Weakness: Yes, Neck Pain: No, Back Pain: Yes   Neurological: Difficulty Walking/Falls: Yes, Headache Migraine: Yes, Dizziness/Vertigo: Yes, Fainting: No, Difficulty with Speech: No, Weakness: Yes, Tingling/Numbness: Yes, Tremors: Yes, Memory Problems: Yes, Seizures: No, Difficulty Swallowing: No, Altered Taste: No.  Cardiovascular: Chest Pain: No, Shortness of Breath: No,   Palpitations: No,  Gastrointestinal: Nausea/Vomiting: No, Constipation: No, Diarrhea: No, Bloody Stools: No   Psych/Cog:  Depression: Yes, Anxiety: No, Hallucinations: No, Problems Concentrating: No  : Frequent Urination: No, Incontinence: No, Blood of Urine: No, Urinary Infections: No, Changes in Sex Drive: No   ENT:Hearing Loss: Yes, Earache: No, Ringing in Ears: Yes,   Facial Pain: No, Chronic Congestion: No   Immune:  Seasonal Allergies: Yes, Hives and/or Rashes: No  The remainder of the review of twelve body systems was reviewed and normal.    Past Medical, Surgical, Family & Social History:   Past Medical History:   Diagnosis Date    Adenomatous polyp 2003    Adenomatous polyp     Allergy     Arthritis     Back pain     after trauma beginning in 195    Cataract     Chronic pain     neck and left shoulder    Cluster headache 2013    Colon polyp     Degenerative disc disease     Depression     Diverticulitis 12/2013    Elevated PSA     Fibromyalgia 2013    GERD (gastroesophageal reflux disease)     Hepatitis 1970's    A    History of prostate biopsy 2002    Hyperlipidemia     Joint pain     Sleep apnea     Special screening for malignant neoplasms, colon 5/5/2014    Thyroid nodule 7/16/2014    Visual disturbance 2012    problems after cataract surgery       Home Medications:     Current Outpatient Medications:     butalbital-acetaminophen-caffeine -40 mg (FIORICET, ESGIC) -40 mg per tablet, Take 1 tablet by mouth 6 hours as needed for Headaches., Disp: 30 tablet, Rfl: 0    clonazePAM (KLONOPIN) 0.5 MG tablet, Take 2 tablets by mouth at bedtime and 1 tablet as needed for tinnitus, Disp: 75 tablet, Rfl: 5    cyanocobalamin 1,000 mcg/mL injection, , Disp: , Rfl:     diclofenac sodium (VOLTAREN) 1 % Gel, Apply 2 g topically once daily., Disp: 100 g, Rfl: 0    HYDROcodone-acetaminophen (NORCO)  mg per tablet, Take 1 tablet by mouth every 6 (six) hours as needed for Pain., Disp: 28 tablet, Rfl: 0    lamoTRIgine (LAMICTAL) 200 MG tablet, Take 1 tablet (200 mg total) by mouth once daily., Disp: 90 tablet, Rfl: 3    methocarbamoL (ROBAXIN) 750 MG Tab, Take 1 tablet (750 mg total) by mouth 3 (three) times daily as needed., Disp: 90 tablet, Rfl: 11    pravastatin (PRAVACHOL) 40 MG tablet, Take 1 tablet (40 mg total) by mouth once daily., Disp: 90 tablet, Rfl: 3    pregabalin (LYRICA) 50 MG  "capsule, Take 1 capsule (50 mg total) by mouth 2 (two) times daily., Disp: 60 capsule, Rfl: 5    RABEprazole (ACIPHEX) 20 mg tablet, Take 1 tablet (20 mg total) by mouth 2 (two) times daily., Disp: 180 tablet, Rfl: 3    selegiline (EMSAM) 12 mg/24 hr, Place 1 new patch onto the skin once daily., Disp: 90 patch, Rfl: 3    senna-docusate 8.6-50 mg (PERICOLACE) 8.6-50 mg per tablet, Take 2 tablets by mouth nightly as needed for Constipation., Disp: , Rfl:     sucralfate (CARAFATE) 1 gram tablet, TAKE 1 TABLET BY MOUTH 4 TIMES A DAY, Disp: 120 tablet, Rfl: 4    tadalafil (CIALIS) 5 MG tablet, Take 1 tablet (5 mg total) by mouth once daily., Disp: 30 tablet, Rfl: 12    traZODone (DESYREL) 100 MG tablet, Take 1 tablet (100 mg total) by mouth every evening., Disp: 90 tablet, Rfl: 3    ubrogepant (UBRELVY)tablet 50 mg, Take 1 tablet (50 mg total) by mouth as needed for Migraine. (Patient taking differently: Take 50 mg by mouth once as needed for Migraine. ), Disp: 10 tablet, Rfl: 1    diclofenac potassium (CAMBIA) 50 mg PwPk, Take 50 mg by mouth daily as needed., Disp: 9 each, Rfl: 3    Physical Examination:  /63   Pulse (!) 54   Ht 5' 8" (1.727 m)   Wt 80.1 kg (176 lb 9.4 oz)   BMI 26.85 kg/m²     GENERAL:  General appearance: Well, non-toxic appearing.  No apparent distress.  Fundi exam: ?  Blurring of optic disc margin  Extremities: normal.    MENTAL STATUS:  Alertness, attention span & concentration: normal.  Language: normal.  Orientation to self, place & time:  normal.  Memory, recent & remote: normal.  Fund of knowledge: normal.      SPEECH:  Clear and fluent.  Follows complex commands.    CRANIAL NERVES:  Cranial Nerves II-XII were examined.  II - Visual fields: normal.  III, IV, VI: PERRL, EOMI, No ptosis, No nystagmus.  Patient reports blurred vision out of right eye  V - Facial sensation:  Diminished sensation over the left V1, V2 and V3 regions to fine touch but intact to pinprick " bilaterally  VII - Face symmetry & mobility: normal.  VIII - Hearing: normal.  IX, X - Palate: mobile & midline.  XI - Shoulder shrug: normal.  XII - Tongue protrusion: normal.    GROSS MOTOR:  Gait & station:  Steppage gait, unable to walk on heels or toes.  Able to tandem with but with difficulty  Tone:  Increased tone involving lower extremities and left upper extremity  Abnormal movements:  Postural tremor noted involving bilateral hands- low-amplitude high-frequency tremor  Finger-nose & Heel-knee-shin: normal.      MUSCLE STRENGTH:     Fascics Atrophy RIGHT    LEFT Atrophy Fascics     5 Neck Ext. 5       5 Neck Flex 5       5 Deltoids 5       5 Sh.Ext.Rot. 5       5 Sh.Int.Rot. 5       5 Biceps 5       5 Triceps 5       5 Forearm.Pr. 5       5 Wrist Ext. 5       5 Wrist Flex 5       5 Finger Ext. 5       5 Finger Flex 5       5 FPL 5       5 Inteross. 5                         5 Iliopsoas 5       5 Hip Abduct 5       5 Hip Adduct 5       5 Quads 5       5 Hams 5       2 Dorsiflex 4       3 Plantar Flex 5       0 Ankle Jesus 3       4 Ankle Invert 5       0 Toe Ext. 3       3 Toe Flex 3+                         REFLEXES:    RIGHT Reflex   LEFT   2+ Biceps 2+   2+ Brachiorad. 2+   2+ Triceps 2+        2+ Patellar 2+   0 Ankle 0        Down PLANTAR Down     Extremely thin atrophied calves noted (inverted champagne bottle appearance to his legs) hammertoes also noted      SENSORY:  Light touch: Normal throughout.  Sharp touch: Normal throughout.  Vibration:  Loss up to the level of the right knee on the right, at the right knee noted to be 8 sec and absent to the level of the left medial malleolus on the left and noted to be 6 sec  Temperature:  Gradient to mid leg bilaterally      Negative Tinel sign bilaterally    Diagnostic Data Reviewed:    MRI lumbar spine with and without contrast on 07/08/2020:  FINDINGS:  Redemonstration of posterior instrumented fusion with disc spacer spanning L3 through S5.  There is  similar degree of anterior wedging at L1 with prior vertebral augmentation at T12.  There is disc desiccation at L5-S1 with Modic type 1 endplate changes/edema at L5-S1, new from prior.  Intervertebral disc gas also noted at this level.  Otherwise, no infiltrative marrow process.  Spinal cord terminus lies L1-L2.  Visualized prevertebral and paraspinal soft tissues demonstrate no acute abnormality.  Post-contrast images demonstrate no abnormal paraspinal collection or nerve root enhancement.     T12-L1: No significant posterior disc herniation, spinal canal stenosis, or neural foraminal impingement.     L1-L2: No significant spinal canal stenosis or neural foraminal narrowing.     L2-L3: Facet DJD with mild circumferential bulge and flavum thickening.  No significant central canal stenosis or neural foraminal impingement.     L3-L5: Postoperative change.  Allowing for metallic artifact, no significant central canal stenosis or neural foraminal impingement.     L5-S1: Circumferential bulge with facet DJD.  No significant central canal stenosis.  At least moderate neural foraminal narrowing with possible encroachment of the right exiting L5 nerve root.     Impression:     Posterior instrumented fusion spanning L3 through L5.     Spondylosis change at L5-S1, progressed from 2018 with associated Modic type 1 endplate edema, likely inflammatory in nature.  At least moderate bilateral neural foraminal narrowing at this level with possible encroachment of the right exiting L5 nerve root.      MRI sacroiliac joints on 10/17/2019:    Sacroiliac joints demonstrate no effusion or synovitis.  No osteitis or ankylosis.  No evidence for enthesitis.  Minor degenerative changes are noted.     Bone marrow signal is maintained, without evidence for fracture or infiltrative process.  Partially visualized postoperative changes are seen in the lower lumbar spine.  There is a large disc extrusion extending cranially from L5-S1, measuring  1.9 cm CC by 1.1 cm AP by 1.8 cm TV resulting in narrowing of the right lateral recess with compression of the right L5 nerve root.     There is enlargement of the prostate and distension of the urinary bladder.     Left hip arthroplasty noted.     Impression:     1. No evidence for sacroiliitis.  2. Large disc extrusion at L5-S1 with cranial migration and compression of the right L5 nerve root.     MRI brain without contrast on 03/11/2016:       Findings: Age-appropriate generalized cerebral volume loss. There is few scattered small punctate foci T2/FLAIR signal hyperintensity in the supratentorial white matter   without corresponding diffusion signal abnormality. These are nonspecific and suggestive for mild degree of chronic microvascular ischemic change. There is no restricted diffusion to suggest acute infarction.  Study is limited by lack of contrast.  Compensatory enlargement of ventricles without hydrocephalus. No midline shift or mass effect. No abnormal parenchymal gradient susceptibility. The major intracranial T2 flow-voids are present.  Remote operative change of bilateral cataract repair.  Partial fluid opacification right mastoid air cells  IMPRESSION:    Age-appropriate generalized volume loss with few scattered foci of T2/FLAIR signal abnormality throughout the supratentorial white matter  while nonspecific suggest for a mild degree of  chronic microvascular ischemic change.     Otherwise unremarkable noncontrast MRI brain without evidence for acute infarction.    MRI thoracic spine without contrast on 03/23/2015:    The marrow is normal in signal without evidence of a marrow replacement process, infection, or tumor. There are hemangiomas of the C7, T2 and T10 vertebral bodies.  There is a chronic mild compression fracture of T12 with prior vertebroplasty, stable   compared to prior study.     The spinal cord is normal in signal without evidence of cord edema or myelomalacia. Note is made of  prominence of epidural fat throughout the course of the thoracic spine.      There is no evidence of disk herniation, central canal stenosis, or neuroforaminal stenosis involving the thoracic spine.     Incidentally visualized soft tissues structures of the chest and upper abdomen are within normal limits.  IMPRESSION:         Postoperative change from prior T12 vertebroplasty of mild compression fracture.     No significant central canal or neural foraminal narrowing.    MRI cervical spine 6/20 07/2014:    Findings:     There is mild straightening of the normal cervical lordosis, likely related to patient positioning versus muscle spasm.  Bone marrow signal demonstrates two small T1 hyperintense foci within the C4 and T2 vertebral bodies, likely hemangiomas.  Bone   marrow signal is otherwise unremarkable.  Vertebral body heights are well-maintained.  No acute fractures or dislocations.     Cerebellar tonsils are in their expected location.  The cervicomedullary junction is normal.  The cervical spinal cord demonstrates normal signal and contour.     C2-C3: No central canal stenosis or neural foraminal narrowing.     C3-C4: No central canal stenosis or neural foraminal narrowing.     C4-C5: No central canal stenosis or neural foraminal narrowing.     C5-C6: Bilateral uncovertebral and facet arthrosis result in mild bilateral neural foraminal narrowing.     C6-C7: Bilateral uncovertebral and facet arthrosis result in mild right neural foraminal narrowing.  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     C7-T1: No central canal stenosis or neural foraminal narrowing.     Limited evaluation of the soft tissues demonstrates nonvisualization of the left submandibular gland.  Additionally, there is heterogeneous appearance of the left thyroid gland.  IMPRESSION:         1.  Mild degenerative changes of the lower cervical spine.  2.   Heterogeneity of the left thyroid gland.  Recommend thyroid ultrasound for further evaluation.  3.  Nonvisualization of left submandibular salivary gland.       Assessment and Plan:  Raffy Rutherford Jr. is a 70 y.o. male with history of chronic bilateral lower extremity pain, low back pain, motor neuropathy of ? Etiology ( thought to be hereditary)  who presented to the clinic today for his initial visit with me. He additionally complains of severe disabling headaches.    The patient reports chronic numbness/tingling involving his feet which has gradually progressed up to the level of his calves and more recently he has noted numbness over the dorsum of his hands and in his fingertips.    The patient recently underwent L3-L5 direct lateral interbody fusion and posterior instrumentation, L5-S1 degenerative disc disease, status post L5-S1 micro diskectomy.    On examination, the patient has evidence of right greater than left footdrop with evidence of plantar flexion weakness as well.  He has extremely thin and atrophied gastrocnemius muscles bilaterally and evidence of hammertoes.      His previous EMG/NCS review revealed absent tibial motor amplitude, very low peroneal motor amplitude ( delayed onset at 7.7 milliseconds), intact right sural and superficial peroneal amplitudes with slightly delayed distal peak latency.  Chronic denervation changes were noted from the vastus medialis, extensor hallucis longus, tibialis anterior, tibialis posterior, gastrocnemius and short head of biceps femoris.    The neuropathy is concerning for of axonal Hereditary motor neuropathy given the family history.  Recommend proceeding with genetic testing via [facility], continuation of physical therapy.  The right L5/S1 radiculopathy may have contributed to his footdrop which was addressed with surgery recently and should improve with physical therapy.  However, typically hereditary neuropathies progress slowly.    His headaches meet ICHD-3  criteria for migraine without aura. He tried Emgality x 2 and stopped given no benefit. He has relief with Ubrelvy and Fioricet and inhalation of an oil for acute management . Was never tried on a triptan as he is on a MAO inhibitor.    Assessment:  1.  Idiopathic progressive polyneuropathy  2.  Lumbosacral radiculopathy s/p surgery  3.  Chronic low back pain s/p multiple back surgeries  4.  Migraine without aura  5.  Hand numbness  6.  Left facial numbness      Recommendations:  1.  Genetic blood testing for comprehensive neuropathies panel via [facility]  2.  Physical therapy when able  3.  Bilateral AFO braces for footdrop  4.  EMG/NCS of bilateral upper extremities    For migraines:  Would retry Emgality x 3 patient only tried it for 2 months.  If no benefit would then switch to another CGRP antagnost    For acute management of migraines:  Continue Ubrelvy- increase dose to 100mg   Limit use of Fioricet/Excedrin as much as possible  -Consider addition of Triptan if no benefit with Ubrelvy  ( on Selegiline patch)   The patient is trying to cut down on the amount of narcotics he takes and I talked to him about medication overuse headaches    Given diminished sensation over the left face and MRI brain few years ago with no evidence of acute abnormality I would like to obtain MRA brain to rule out AV malformation along the left trigeminal nerve.  Additionally left face numbness was not documented on neurological examination in March 2020, would obtain MRI brain without contrast as well    Additionally I was unable to visualize the patient's optic discs very well (? Blurred disc margin on the left) would recommend ophthalmology follow-up    The patient will return to clinic in 3 months.      Time Spent: 60 minutes spent face-to-face, >50% spent advising about: counseling and/or coordination of care    This note was created with voice recognition software.  Grammatical, syntax and spelling errors may be inevitable.            Kimberley Millan MD  Medicine- Neurology, Clinical Neurophysiology

## 2020-09-03 NOTE — PATIENT INSTRUCTIONS
Thank you for coming:    You are neuropathy in your feet:  -Genetic testing given strong family history of neuropathy  - Recommend PT when able   - Blood work for neuropathy     Back pain:  - No benefit with ALYSON , management per your surgeon       Headache :    You have episodic migraine without aura     Acute:  Ubrelvy: 100 mg at headache onset and repeat dose 2 hours later, no more than 2 doses in 24 hours   Cambia 50 mg at headache onset - can try this as well   Continue limited Fioricet    Prophylactic:  Retry Emgality x 3 months       Migraine twin emanuel- download on your phone

## 2020-09-03 NOTE — LETTER
September 3, 2020      Jason Caldwell MD  [address]  [address]  [place] [facility] [address]           [facility] [address]  [facility] [address]  Phone: [phone]  Fax: [phone]          Patient: Raffy Rutherford Jr.   MR Number: [id number]   YOB: 1950   Date of Visit: 9/3/2020       Dear Dr. Jason Caldwell:    Thank you for referring Raffy Rutherford to me for evaluation. Attached you will find relevant portions of my assessment and plan of care.    If you have questions, please do not hesitate to call me. I look forward to following Raffy Rutherford along with you.    Sincerely,    Kimberley Millan MD    Enclosure  CC:  No Recipients    If you would like to receive this communication electronically, please contact [email or web address] or [phone] to request more information on [facility] access.    For providers and/or their staff who would like to refer a patient to [facility], please contact us through our one-stop-shop provider referral line, [facility], at [phone].    If you feel you have received this communication in error or would no longer like to receive these types of communications, please e-mail [email or web address]

## 2020-09-03 NOTE — PROCEDURES
Procedures   Procedure:   1. Flexible cysto-uretheroscopy and ureteral stent removal    Pre Procedure Diagnosis:  1. Indwelling left ureteral stent on 8/19/20 for neurosurgery procedure    Post Procedure Diagnosis:  1. Same    Surgeon: Katelynn George MD    Anesthesia: 2% uro-jet lidocaine jelly for local analgesia    Procedure note:  A flexible cysto-urethroscopy was performed after consent was obtained.  The risks and benefits were explained. 2% lidocaine urojet was used for local analgesia. The genitalia was prepped and draped in the sterile fashion. The flexible scope was advanced into the urethra and into the bladder. The distal coil of the ureteral stent was identified and grasped with the cystoscopic alligator graspers and removed intact. The flexible cystoscope was removed. The patient tolerated the procedure well without complication.      Findings in summary:  Stent removed without difficulty, pt tolerated procedure well    Plan:  1. Continue with neurosurgery recovery process.  2. F/u in 1 months for uroflow/pvr and we can determine a course of action to try to improve urinary patterns once further out from neurosurgery. And will discuss ED meds at that time.  3. Neosporin to glans PRN irritation.    They desire f/u after 10/16 as they are going up to [facility] with kids for vacation

## 2020-09-03 NOTE — TELEPHONE ENCOUNTER
Dr. Albert reviewed pt's notes.   Pt needs to see Dr. Ocampo for complete exam.   Left pt message with instructions.      [place] spoke with patient. Patient is gathering financial information. Meeting not scheduled with [facility] as of yet. Will follow up with patient tomorrow.

## 2020-09-04 ENCOUNTER — OFFICE VISIT (OUTPATIENT)
Dept: OPTOMETRY | Facility: CLINIC | Age: 70
End: 2020-09-04
Payer: COMMERCIAL

## 2020-09-04 DIAGNOSIS — Z96.1 PSEUDOPHAKIA OF BOTH EYES: ICD-10-CM

## 2020-09-04 DIAGNOSIS — Z04.9 DISEASE RULED OUT AFTER EXAMINATION: ICD-10-CM

## 2020-09-04 DIAGNOSIS — G50.0 TRIGEMINAL NEURALGIA OF LEFT SIDE OF FACE: ICD-10-CM

## 2020-09-04 DIAGNOSIS — H04.123 DRY EYE SYNDROME, BILATERAL: ICD-10-CM

## 2020-09-04 DIAGNOSIS — H52.4 PRESBYOPIA: Primary | ICD-10-CM

## 2020-09-04 LAB
ALBUMIN SERPL ELPH-MCNC: 4.05 G/DL (ref 3.35–5.55)
ALPHA1 GLOB SERPL ELPH-MCNC: 0.31 G/DL (ref 0.17–0.41)
ALPHA2 GLOB SERPL ELPH-MCNC: 0.83 G/DL (ref 0.43–0.99)
B-GLOBULIN SERPL ELPH-MCNC: 0.71 G/DL (ref 0.5–1.1)
GAMMA GLOB SERPL ELPH-MCNC: 0.9 G/DL (ref 0.67–1.58)
INTERPRETATION SERPL IFE-IMP: NORMAL
KAPPA LC SER QL IA: 0.93 MG/DL (ref 0.33–1.94)
KAPPA LC/LAMBDA SER IA: 1.6 (ref 0.26–1.65)
LAMBDA LC SER QL IA: 0.58 MG/DL (ref 0.57–2.63)
PROT SERPL-MCNC: 6.8 G/DL (ref 6–8.4)

## 2020-09-04 PROCEDURE — 99999 PR PBB SHADOW E&M-EST. PATIENT-LVL III: ICD-10-PCS | Mod: PBBFAC,,, | Performed by: OPTOMETRIST

## 2020-09-04 PROCEDURE — 92014 COMPRE OPH EXAM EST PT 1/>: CPT | Mod: S$GLB,,, | Performed by: OPTOMETRIST

## 2020-09-04 PROCEDURE — 99999 PR PBB SHADOW E&M-EST. PATIENT-LVL III: CPT | Mod: PBBFAC,,, | Performed by: OPTOMETRIST

## 2020-09-04 PROCEDURE — 92015 PR REFRACTION: ICD-10-PCS | Mod: S$GLB,,, | Performed by: OPTOMETRIST

## 2020-09-04 PROCEDURE — 92014 PR EYE EXAM, EST PATIENT,COMPREHESV: ICD-10-PCS | Mod: S$GLB,,, | Performed by: OPTOMETRIST

## 2020-09-04 PROCEDURE — 92015 DETERMINE REFRACTIVE STATE: CPT | Mod: S$GLB,,, | Performed by: OPTOMETRIST

## 2020-09-04 NOTE — PROGRESS NOTES
HPI     Pt here for annual visit  Pt states he feels like he sees better without his glasses at a distance  Patient denies diplopia, headaches, flashes/floaters, pain, and   itching/burning/tearing.    Pt does not use any eye drops.      Last edited by Yudi Morse on 9/4/2020  3:36 PM. (History)            Assessment /Plan     For exam results, see Encounter Report.    Trigeminal neuralgia of left side of face  Disease ruled out after examination   Healthy optic nerve appearance today   No evidence of edema   No ocular indications of AV malformation    Pseudophakia of both eyes  cataract sx by Dr. Escobar OREJI03/26/2012  O.S. 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

## 2020-09-08 DIAGNOSIS — G43.819 OTHER MIGRAINE WITHOUT STATUS MIGRAINOSUS, INTRACTABLE: Primary | ICD-10-CM

## 2020-09-08 DIAGNOSIS — Z98.1 S/P LUMBAR FUSION: ICD-10-CM

## 2020-09-08 LAB
COPPER SERPL-MCNC: 1056 UG/L (ref 665–1480)
PATHOLOGIST INTERPRETATION IFE: NORMAL
PATHOLOGIST INTERPRETATION SPE: NORMAL

## 2020-09-08 RX ORDER — UBROGEPANT 100 MG/1
100 TABLET ORAL ONCE AS NEEDED
Qty: 30 TABLET | Refills: 2 | Status: SHIPPED | OUTPATIENT
Start: 2020-09-08 | End: 2021-02-16

## 2020-09-08 RX ORDER — GALCANEZUMAB 120 MG/ML
120 INJECTION, SOLUTION SUBCUTANEOUS
Qty: 4 ML | Refills: 3 | Status: SHIPPED | OUTPATIENT
Start: 2020-09-08 | End: 2021-09-28

## 2020-09-09 DIAGNOSIS — N41.9 PROSTATITIS, UNSPECIFIED PROSTATITIS TYPE: Primary | ICD-10-CM

## 2020-09-09 LAB — VIT B1 BLD-MCNC: 70 UG/L (ref 38–122)

## 2020-09-09 RX ORDER — SULFAMETHOXAZOLE AND TRIMETHOPRIM 800; 160 MG/1; MG/1
1 TABLET ORAL 2 TIMES DAILY
Qty: 60 TABLET | Refills: 0 | Status: SHIPPED | OUTPATIENT
Start: 2020-09-09 | End: 2020-09-14

## 2020-09-10 ENCOUNTER — LAB VISIT (OUTPATIENT)
Dept: LAB | Facility: HOSPITAL | Age: 70
End: 2020-09-10
Attending: STUDENT IN AN ORGANIZED HEALTH CARE EDUCATION/TRAINING PROGRAM
Payer: COMMERCIAL

## 2020-09-10 DIAGNOSIS — R20.0 LEFT FACIAL NUMBNESS: Primary | ICD-10-CM

## 2020-09-10 DIAGNOSIS — N41.9 PROSTATITIS, UNSPECIFIED PROSTATITIS TYPE: ICD-10-CM

## 2020-09-10 LAB — PYRIDOXAL SERPL-MCNC: 124 UG/L (ref 5–50)

## 2020-09-10 PROCEDURE — 87086 URINE CULTURE/COLONY COUNT: CPT

## 2020-09-10 PROCEDURE — 87088 URINE BACTERIA CULTURE: CPT

## 2020-09-10 PROCEDURE — 87186 SC STD MICRODIL/AGAR DIL: CPT

## 2020-09-10 PROCEDURE — 87077 CULTURE AEROBIC IDENTIFY: CPT

## 2020-09-10 RX ORDER — DICLOFENAC SODIUM 50 MG/1
50 TABLET, DELAYED RELEASE ORAL 2 TIMES DAILY PRN
Qty: 8 TABLET | Refills: 3 | Status: SHIPPED | OUTPATIENT
Start: 2020-09-10 | End: 2021-04-01

## 2020-09-12 ENCOUNTER — HOSPITAL ENCOUNTER (OUTPATIENT)
Dept: RADIOLOGY | Facility: HOSPITAL | Age: 70
Discharge: HOME | End: 2020-09-12
Attending: PSYCHIATRY & NEUROLOGY
Payer: COMMERCIAL

## 2020-09-12 DIAGNOSIS — G50.0 TRIGEMINAL NEURALGIA OF LEFT SIDE OF FACE: ICD-10-CM

## 2020-09-12 LAB — BACTERIA UR CULT: ABNORMAL

## 2020-09-12 PROCEDURE — 25500020 PHARM REV CODE 255: Performed by: PSYCHIATRY & NEUROLOGY

## 2020-09-12 PROCEDURE — 70553 MRI BRAIN W WO CONTRAST: ICD-10-PCS | Mod: 26,,, | Performed by: RADIOLOGY

## 2020-09-12 PROCEDURE — A9585 GADOBUTROL INJECTION: HCPCS | Performed by: PSYCHIATRY & NEUROLOGY

## 2020-09-12 PROCEDURE — 70544 MR ANGIOGRAPHY HEAD W/O DYE: CPT | Mod: 26,59,, | Performed by: RADIOLOGY

## 2020-09-12 PROCEDURE — 70553 MRI BRAIN STEM W/O & W/DYE: CPT | Mod: TC

## 2020-09-12 PROCEDURE — 70544 MRA BRAIN WITHOUT CONTRAST: ICD-10-PCS | Mod: 26,59,, | Performed by: RADIOLOGY

## 2020-09-12 PROCEDURE — 70544 MR ANGIOGRAPHY HEAD W/O DYE: CPT | Mod: TC

## 2020-09-12 PROCEDURE — 70553 MRI BRAIN STEM W/O & W/DYE: CPT | Mod: 26,,, | Performed by: RADIOLOGY

## 2020-09-12 RX ORDER — GADOBUTROL 604.72 MG/ML
8 INJECTION INTRAVENOUS
Status: COMPLETED | OUTPATIENT
Start: 2020-09-12 | End: 2020-09-12

## 2020-09-12 RX ADMIN — GADOBUTROL 8 ML: 604.72 INJECTION INTRAVENOUS at 11:09

## 2020-09-14 ENCOUNTER — IMMUNIZATION (OUTPATIENT)
Dept: PHARMACY | Facility: CLINIC | Age: 70
End: 2020-09-14
Payer: MEDICARE

## 2020-09-14 DIAGNOSIS — R82.71 BACTERIURIA: Primary | ICD-10-CM

## 2020-09-14 RX ORDER — NITROFURANTOIN 25; 75 MG/1; MG/1
100 CAPSULE ORAL 2 TIMES DAILY
Qty: 14 CAPSULE | Refills: 0 | Status: SHIPPED | OUTPATIENT
Start: 2020-09-14 | End: 2020-09-22

## 2020-09-17 ENCOUNTER — PATIENT MESSAGE (OUTPATIENT)
Dept: NEUROLOGY | Facility: CLINIC | Age: 70
End: 2020-09-17

## 2020-09-17 ENCOUNTER — PROCEDURE VISIT (OUTPATIENT)
Dept: NEUROLOGY | Facility: CLINIC | Age: 70
End: 2020-09-17
Payer: COMMERCIAL

## 2020-09-17 DIAGNOSIS — G60.0: Primary | ICD-10-CM

## 2020-09-17 DIAGNOSIS — G60.3 IDIOPATHIC PROGRESSIVE POLYNEUROPATHY: ICD-10-CM

## 2020-09-17 PROCEDURE — 95886 PR EMG COMPLETE, W/ NERVE CONDUCTION STUDIES, 5+ MUSCLES: ICD-10-PCS | Mod: S$GLB,,, | Performed by: PSYCHIATRY & NEUROLOGY

## 2020-09-17 PROCEDURE — 95913 NRV CNDJ TEST 13/> STUDIES: CPT | Mod: S$GLB,,, | Performed by: PSYCHIATRY & NEUROLOGY

## 2020-09-17 PROCEDURE — 95886 MUSC TEST DONE W/N TEST COMP: CPT | Mod: S$GLB,,, | Performed by: PSYCHIATRY & NEUROLOGY

## 2020-09-17 PROCEDURE — 95913 PR NERVE CONDUCTION STUDY; 13 OR MORE STUDIES: ICD-10-PCS | Mod: S$GLB,,, | Performed by: PSYCHIATRY & NEUROLOGY

## 2020-09-17 NOTE — PROCEDURES
Procedures     EMG/NCS  was performed in the lab. Report scanned into chart.          Kimberley Millan MD  Medicine-Neurology, Clinical Neurophysiology

## 2020-09-17 NOTE — PATIENT INSTRUCTIONS
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

## 2020-09-25 ENCOUNTER — PATIENT MESSAGE (OUTPATIENT)
Dept: NEUROSURGERY | Facility: CLINIC | Age: 70
End: 2020-09-25

## 2020-09-29 ENCOUNTER — PATIENT MESSAGE (OUTPATIENT)
Dept: NEUROSURGERY | Facility: CLINIC | Age: 70
End: 2020-09-29

## 2020-09-30 ENCOUNTER — PATIENT MESSAGE (OUTPATIENT)
Dept: NEUROSURGERY | Facility: CLINIC | Age: 70
End: 2020-09-30

## 2020-09-30 ENCOUNTER — TELEPHONE (OUTPATIENT)
Dept: NEUROSURGERY | Facility: CLINIC | Age: 70
End: 2020-09-30

## 2020-09-30 RX ORDER — HYDROCODONE BITARTRATE AND ACETAMINOPHEN 7.5; 325 MG/1; MG/1
1 TABLET ORAL EVERY 8 HOURS PRN
Qty: 21 TABLET | Refills: 0 | Status: SHIPPED | OUTPATIENT
Start: 2020-09-30 | End: 2020-11-10 | Stop reason: SDUPTHER

## 2020-09-30 RX ORDER — PREGABALIN 100 MG/1
100 CAPSULE ORAL 2 TIMES DAILY
Qty: 10 CAPSULE | Refills: 0 | Status: SHIPPED | OUTPATIENT
Start: 2020-09-30 | End: 2020-09-30 | Stop reason: SDUPTHER

## 2020-09-30 RX ORDER — PREGABALIN 50 MG/1
50 CAPSULE ORAL 2 TIMES DAILY
Qty: 10 CAPSULE | Refills: 0 | Status: SHIPPED | OUTPATIENT
Start: 2020-09-30 | End: 2020-09-30

## 2020-09-30 RX ORDER — PREGABALIN 100 MG/1
100 CAPSULE ORAL 2 TIMES DAILY
Qty: 60 CAPSULE | Refills: 11 | Status: SHIPPED | OUTPATIENT
Start: 2020-09-30 | End: 2020-09-30

## 2020-09-30 NOTE — TELEPHONE ENCOUNTER
----- Message from Denise Guadalupe sent at 9/30/2020  3:29 PM CDT -----  Regarding: Appointment and Refill  Type:  Appointment and Refill Request    Name of Caller:   Patient states need to speak with nurse to schedule appointment nd need medication refill  When is the first available appointment?  Symptoms:  Best Call Back Number:[phone]  Additional Information: please call patient for more info

## 2020-10-02 ENCOUNTER — PATIENT MESSAGE (OUTPATIENT)
Dept: PAIN MEDICINE | Facility: OTHER | Age: 70
End: 2020-10-02

## 2020-10-04 ENCOUNTER — PATIENT MESSAGE (OUTPATIENT)
Dept: PAIN MEDICINE | Facility: OTHER | Age: 70
End: 2020-10-04

## 2020-10-05 ENCOUNTER — LAB VISIT (OUTPATIENT)
Dept: INTERNAL MEDICINE | Facility: CLINIC | Age: 70
End: 2020-10-05
Payer: COMMERCIAL

## 2020-10-05 ENCOUNTER — TELEPHONE (OUTPATIENT)
Dept: INTERNAL MEDICINE | Facility: CLINIC | Age: 70
End: 2020-10-05

## 2020-10-05 ENCOUNTER — PATIENT MESSAGE (OUTPATIENT)
Dept: INTERNAL MEDICINE | Facility: CLINIC | Age: 70
End: 2020-10-05

## 2020-10-05 DIAGNOSIS — Z20.822 SUSPECTED COVID-19 VIRUS INFECTION: Primary | ICD-10-CM

## 2020-10-05 DIAGNOSIS — Z20.822 SUSPECTED COVID-19 VIRUS INFECTION: ICD-10-CM

## 2020-10-05 PROCEDURE — U0003 INFECTIOUS AGENT DETECTION BY NUCLEIC ACID (DNA OR RNA); SEVERE ACUTE RESPIRATORY SYNDROME CORONAVIRUS 2 (SARS-COV-2) (CORONAVIRUS DISEASE [COVID-19]), AMPLIFIED PROBE TECHNIQUE, MAKING USE OF HIGH THROUGHPUT TECHNOLOGIES AS DESCRIBED BY CMS-2020-01-R: HCPCS

## 2020-10-06 LAB — SARS-COV-2 RNA RESP QL NAA+PROBE: NOT DETECTED

## 2020-10-09 ENCOUNTER — PATIENT MESSAGE (OUTPATIENT)
Dept: ORTHOPEDICS | Facility: CLINIC | Age: 70
End: 2020-10-09

## 2020-10-12 ENCOUNTER — OFFICE VISIT (OUTPATIENT)
Dept: NEUROSURGERY | Facility: CLINIC | Age: 70
End: 2020-10-12
Payer: COMMERCIAL

## 2020-10-12 ENCOUNTER — HOSPITAL ENCOUNTER (OUTPATIENT)
Dept: RADIOLOGY | Facility: HOSPITAL | Age: 70
Discharge: HOME | End: 2020-10-12
Attending: NEUROLOGICAL SURGERY
Payer: COMMERCIAL

## 2020-10-12 DIAGNOSIS — G89.4 CHRONIC PAIN SYNDROME: Primary | ICD-10-CM

## 2020-10-12 DIAGNOSIS — G62.9 POLYNEUROPATHY: ICD-10-CM

## 2020-10-12 DIAGNOSIS — M43.27 FUSION OF SPINE, LUMBOSACRAL REGION: ICD-10-CM

## 2020-10-12 DIAGNOSIS — Z98.1 S/P LUMBAR FUSION: ICD-10-CM

## 2020-10-12 PROCEDURE — 99024 PR POST-OP FOLLOW-UP VISIT: ICD-10-PCS | Mod: S$GLB,,, | Performed by: NEUROLOGICAL SURGERY

## 2020-10-12 PROCEDURE — 72100 X-RAY EXAM L-S SPINE 2/3 VWS: CPT | Mod: 26,,, | Performed by: RADIOLOGY

## 2020-10-12 PROCEDURE — 72100 XR LUMBAR SPINE AP AND LATERAL: ICD-10-PCS | Mod: 26,,, | Performed by: RADIOLOGY

## 2020-10-12 PROCEDURE — 72100 X-RAY EXAM L-S SPINE 2/3 VWS: CPT | Mod: TC,PN

## 2020-10-12 PROCEDURE — 99999 PR PBB SHADOW E&M-EST. PATIENT-LVL III: ICD-10-PCS | Mod: PBBFAC,,, | Performed by: NEUROLOGICAL SURGERY

## 2020-10-12 PROCEDURE — 99999 PR PBB SHADOW E&M-EST. PATIENT-LVL III: CPT | Mod: PBBFAC,,, | Performed by: NEUROLOGICAL SURGERY

## 2020-10-12 PROCEDURE — 99024 POSTOP FOLLOW-UP VISIT: CPT | Mod: S$GLB,,, | Performed by: NEUROLOGICAL SURGERY

## 2020-10-12 NOTE — PROGRESS NOTES
NEUROSURGICAL POST-OPERATIVE PROGRESS NOTE    DATE OF SERVICE:  10/12/2020      ATTENDING PHYSICIAN:  Jason Caldwell MD    SUBJECTIVE:    INTERIM HISTORY:    This is a very pleasant 70 y.o. y.o. male, who is status 5 S1 anterior interbody fusion placement of interbody spacer for significant degenerative disc disease and foraminal stenosis.  Patient reports significant improvement in his back and leg pain since the surgery.  He is able to walk better.  He has been doing physical therapy.  Some of the exercises like bridges have been painful.  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  He is contemplating to start the functional restoration program at [facility].    Low Back Pain Scale  R Low Back-Pain Score: 7  R Low Back-Pain Intensity: Pain killers give moderate relief from pain  Personal Care : It is painful to look after myself and I am slow and careful.  Lifting: I can only lift very light weights.  Walking: Pain prevents me walking more than 1/4 mile.  Sitting: Pain prevents me from sitting more than one hour.   Low Back-Standing: I cannot stand for longer than 30 mins without increasing pain   Low Back-Sleeping: Because of pain my normal nights sleep is reduced by less than one quarter  Social Life: Pain has restricted my social life, and I do not go out as often.   Low Back-Traveling: I have extra pain while traveling but it does not compel me to seek alternate forms of travel   Low Back-Changing Degree of Pain: My pain seems to be getting better but improvement is slow         OBJECTIVE:    PHYSICAL EXAMINATION:   There were no vitals filed for this visit.    Neurosurgery Physical Exam    Ortho Exam    Neurologic Exam     Motor Exam     Strength   Right anterior tibial: 3/5  Left anterior tibial: 4/5      Wound has healed.    DIAGNOSTIC DATA:    X-ray of the lumbar spine, AP and lateral views reveals the fusion hardware is intact. No loosening of screws or migration of  hardware.    ASSESMENT:    This is a 70 y.o. male who is s/p L5-S1 anterior interbody fusion for severe degenerative disc disease and foraminal stenosis with significant improvement in back and leg pain.  Recovering well from his lumbar fusion surgery    Problem List Items Addressed This Visit        Neuro    Chronic pain - Primary    Overview     neck and left shoulder         Relevant Orders    Ambulatory referral/consult to [facility]      Other Visit Diagnoses     Polyneuropathy        Relevant Orders    Ambulatory referral/consult to [facility]    S/P lumbar fusion              PLAN:    Continue physical therapy  Okay to stop using back brace and only using it as needed  Follow-up in 6 weeks with repeat lumbar x-rays        Jason Caldwell MD  Pager: [phone]

## 2020-10-14 ENCOUNTER — INITIAL CONSULT (OUTPATIENT)
Dept: PAIN MEDICINE | Facility: OTHER | Age: 70
End: 2020-10-14
Payer: COMMERCIAL

## 2020-10-14 DIAGNOSIS — Z78.9 DECREASED ACTIVITIES OF DAILY LIVING (ADL): ICD-10-CM

## 2020-10-14 DIAGNOSIS — Z98.1 S/P LUMBAR SPINAL FUSION: ICD-10-CM

## 2020-10-14 DIAGNOSIS — G62.9 POLYNEUROPATHY: ICD-10-CM

## 2020-10-14 DIAGNOSIS — R52 PAIN AGGRAVATED BY ACTIVITIES OF DAILY LIVING: ICD-10-CM

## 2020-10-14 DIAGNOSIS — F32.A DEPRESSION, UNSPECIFIED DEPRESSION TYPE: ICD-10-CM

## 2020-10-14 DIAGNOSIS — Z74.09 IMPAIRED FUNCTIONAL MOBILITY AND ENDURANCE: ICD-10-CM

## 2020-10-14 DIAGNOSIS — G43.709 CHRONIC MIGRAINE WITHOUT AURA WITHOUT STATUS MIGRAINOSUS, NOT INTRACTABLE: ICD-10-CM

## 2020-10-14 DIAGNOSIS — M21.372 BILATERAL FOOT-DROP: ICD-10-CM

## 2020-10-14 DIAGNOSIS — G89.4 CHRONIC PAIN DISORDER: Primary | ICD-10-CM

## 2020-10-14 DIAGNOSIS — G89.4 CHRONIC PAIN SYNDROME: ICD-10-CM

## 2020-10-14 DIAGNOSIS — M21.371 BILATERAL FOOT-DROP: ICD-10-CM

## 2020-10-14 PROCEDURE — 1101F PT FALLS ASSESS-DOCD LE1/YR: CPT | Mod: CPTII,,, | Performed by: NURSE PRACTITIONER

## 2020-10-14 PROCEDURE — 1159F PR MEDICATION LIST DOCUMENTED IN MEDICAL RECORD: ICD-10-PCS | Mod: ,,, | Performed by: NURSE PRACTITIONER

## 2020-10-14 PROCEDURE — 99205 OFFICE O/P NEW HI 60 MIN: CPT | Mod: ,,, | Performed by: NURSE PRACTITIONER

## 2020-10-14 PROCEDURE — 1101F PR PT FALLS ASSESS DOC 0-1 FALLS W/OUT INJ PAST YR: ICD-10-PCS | Mod: CPTII,,, | Performed by: NURSE PRACTITIONER

## 2020-10-14 PROCEDURE — 99205 PR OFFICE/OUTPT VISIT, NEW, LEVL V, 60-74 MIN: ICD-10-PCS | Mod: ,,, | Performed by: NURSE PRACTITIONER

## 2020-10-14 PROCEDURE — 1159F MED LIST DOCD IN RCRD: CPT | Mod: ,,, | Performed by: NURSE PRACTITIONER

## 2020-10-14 NOTE — PROGRESS NOTES
[facility]    Initial Medical Screening Visit Note    Subjective:       Chief Complaint Requiring Rehabilitation: Chronic Pain    Consulted by: Dr. Caldwell (Neurosurgery)    HPI:     Raffy Rutherford Jr. is a 70 y.o. male who presents today for the [facility] Medical Screening Visit. Pt c/o pain in lower back and RLE. Also HAs on left side of head. Has had low back and RLE pain for many years. Reports onset was gradual and denies inciciting event or injury. Has been dealing with HAs since 19 yo. Reports being dx with cluster and migraine HAs. Has bilateral foot drop, R>L. Reports this is due to polyneuropathy. Has been dealing with this for 8 years with progressive worsening; followed by neurology.     RLE and low back pain are constant. Pins and needles, alternating hot and cold in his foot; not sure what to call the pain in his back; 'it hurts'. 'life as we know it' triggers the pain to esvalate; back pain typically worse over the course of the day. He is doing PT now and sometimes ths causes a pain flare; sitting and standing too long. 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     He is also followed by neurlogy for HA mgmt. HAs come and go. Says neurologist is trying some new things now. Says once they start they are hard to get rid of. Seasonal changes exacerabte HAs. Medication helps to decrease the pain. Sometimes the HA can last a week or two, but medication helps to minimize pain.       1. Ambulatory status:  Walks with a single point cane. Uses this when he goes out.     2. Balance problems?  Yes. 'I can sway a little backwards sometimes and my legs will buckle sometimes'; no major falls in the last year. Has right foot drop.     3. Exercise routine?  Doing PT and has a  one day a week.     4. Physical Therapy? Yes. Currently in PT,  s/p lumbar fusion surgery. Has had other PT in the past. Has done [facility] twice.       Massage? No- has hurt in the past (isolated massage in PT currently is helpful)   TENS? yes   Heat/Ice? Yes    Acupuncture? Yes    Bracing? Back brace (post-op)       5. Current pain medications:  norco 7.5 (1/2 tab 2-3 times daily); rx by Dr. Caldwell right now (was taking pre-op as well). Has been off/on opioids throughout the years, mostly post-op courses   Robaxin (post-op and prn; taking sparingly now)  voltaren gel     6. Pain management injections?  Yes. ESIs, right SI joint inj    7. Relevant surgeries?  S/p fusion 6 weeks ago, 8/20/20  4 back surgeries total (Dr. Caldwell, Dr. Browne, Dr. Luiz Bhagat)  Hx of t12 compression fx; s/p vertebroplaryt which was helpful  Left hip replacement (Dr. Lamb)    Reports 'moderate' pain relief after back surgeries     8. Pain affecting function at work, home, and/or recreationally?  Yes. Used to play tennis and hike. 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 He is an  (volunteer mental health advocacy) and has difficulty concentrating for a long period of time. 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 Still composes and goes into the studio.     9. Pain affecting sleep?  Sleeps okay. Has sleep apnea. Wears CPAP. Does not feel rested upon wakening. Tries to take a short nap in the afternoon.   Taking medicine to help with sleep? Trazodone       10. Pain affecting mood?   Hx of depression. Reports it is relatively well controlled; covid and surgery have not helped.    Taking medication to help with mood disturbance? Emsam patch    Followed by a pharmopsychologist q 3-6 months and prn.      Suicidal ideation presently? No     Hx of psychiatric hospitalization? Yes. For depression; in [place]; felt this was more for addiction and did not help with his depression       11. Work  status  appx 10-12 hours of counseling. Voluntary legal work. Musician     12. Social information:     Lives with: wife. She is an DORETHA at [employer].      Smoker? No       Alcohol use? Moderate wine drinking; 1-2 glasses of wine daily      Drug use? No      History of substance abuse? No       Past Medical History:   Diagnosis Date    Adenomatous polyp 2003    Adenomatous polyp     Allergy     Arthritis     Back pain     after trauma beginning in 195    Cataract     Chronic pain     neck and left shoulder    Cluster headache 2013    Colon polyp     Degenerative disc disease     Depression     Diverticulitis 12/2013    Elevated PSA     Fibromyalgia 2013    GERD (gastroesophageal reflux disease)     Hepatitis 1970's    A    History of prostate biopsy 2002    Hyperlipidemia     Joint pain     Sleep apnea     Special screening for malignant neoplasms, colon 5/5/2014    Thyroid nodule 7/16/2014    Visual disturbance 2012    problems after cataract surgery       Past Surgical History:   Procedure Laterality Date    BACK SURGERY      CATARACT EXTRACTION W/  INTRAOCULAR LENS IMPLANT Bilateral     CHOLECYSTECTOMY      COLONOSCOPY N/A 12/17/2019    Procedure: COLONOSCOPY;  Surgeon: Amadou Hardin MD;  [facility]  Service: Endoscopy;  Laterality: N/A;  pt request to do Miralax bowel prep-BB    COSMETIC SURGERY  2/10/2015    Direct mid-forehead brow lift    COSMETIC SURGERY  2/10/2015    Bilateral upper lid blepahroplasty    CYSTOSCOPY WITH URETEROSCOPY, RETROGRADE PYELOGRAPHY, AND INSERTION OF STENT Left 8/19/2020    Procedure: CYSTOSCOPY, WITH RETROGRADE PYELOGRAM AND URETERAL STENT INSERTION;  Surgeon: Katelynn George MD;  Location: [facility];  Service: Urology;  Laterality: Left;    ESOPHAGOGASTRODUODENOSCOPY N/A 12/17/2019    Procedure: ESOPHAGOGASTRODUODENOSCOPY (EGD);  Surgeon: Amadou Hardin MD;  [facility]  Service: Endoscopy;  Laterality: N/A;    EYE  SURGERY      FUSION OF LUMBAR SPINE BY ANTERIOR APPROACH N/A 8/20/2020    Procedure: FUSION, SPINE, LUMBAR, ANTERIOR APPROACH L5-S1 ALIF Stand Alone;  Surgeon: Jason Caldwell MD;  Location: [facility] OR;  Service: Neurosurgery;  Laterality: N/A;    HEMORRHOID SURGERY      with complication of chronic bleeding for 6 weeks     INJECTION OF STEROID Right 12/6/2018    Procedure: INJECTION, STEROID Right SI Joint Block and Steroid Injection;  Surgeon: Jason Caldwell MD;  Location: [facility] OR;  Service: Neurosurgery;  Laterality: Right;  Procedure: Right SI Joint Block and Steroid Injection  Surgery Time: 30 MIN  LOS: 0  Anesthesia: MAC  Radiology: C-arm  Bed: [place] 4 Poster  Position: Prone    INJECTION OF STEROID Right 9/19/2019    Procedure: INJECTION, STEROID Procedure: Right SI joint block nd steroid injection;  Surgeon: Jason Caldwell MD;  Location: [facility] OR;  Service: Neurosurgery;  Laterality: Right;  Procedure: Right SI joint block nd steroid injection  Surgery Time: 30 mins  LOS:   Anesthesia: General MAC  Radiology:C-arm  Bed: Regular Bed  Position: Prone    JOINT REPLACEMENT      KNEE SURGERY      involving arthroscopic surgery to both knees    SINUS SURGERY      SINUS SURGERY      left molar and sinus surgery for trigeminal neuralgia    SPINAL FUSION  6/22/2015    L3-L5 XLIF    SPINE SURGERY  6/22/15    XLIF/TANA    TOTAL HIP ARTHROPLASTY  4/2012    Pt states he had total hip replacement on his left hip.       Review of patient's allergies indicates:   Allergen Reactions    Alphagan [brimonidine]      Patient taking MASO-B Selective Inhibitor Selegiline (Emsam)    Coumadin [warfarin]      itch    Oxycodone      hiccups       Current Outpatient Medications   Medication Sig Dispense Refill    butalbital-acetaminophen-caffeine -40 mg (FIORICET, ESGIC) -40 mg per tablet Take 1 tablet by mouth 6 hours as needed for Headaches. 30 tablet 0    clonazePAM (KLONOPIN) 0.5 MG tablet Take 2  tablets by mouth at bedtime and 1 tablet as needed for tinnitus 75 tablet 5    cyanocobalamin 1,000 mcg/mL injection       diclofenac (VOLTAREN) 50 MG EC tablet Take 1 tablet (50 mg total) by mouth 2 (two) times daily as needed (for headache, take with food if possible). 8 tablet 3    diclofenac potassium (CAMBIA) 50 mg PwPk Take 50 mg by mouth daily as needed. 9 each 3    diclofenac sodium (VOLTAREN) 1 % Gel Apply 2 g topically once daily. 100 g 0    galcanezumab-gnlm (EMGALITY PEN) 120 mg/mL PnIj Inject 120 mg into the skin every 28 days. 4 mL 3    HYDROcodone-acetaminophen (NORCO) 7.5-325 mg per tablet Take 1 tablet by mouth every 8 (eight) hours as needed for Pain. (Patient taking differently: Take 1 tablet by mouth 3 (three) times daily. 1/2 tablet 3 times a day.) 21 tablet 0    lamoTRIgine (LAMICTAL) 200 MG tablet Take 1 tablet (200 mg total) by mouth once daily. 90 tablet 3    methocarbamoL (ROBAXIN) 750 MG Tab Take 1 tablet (750 mg total) by mouth 3 (three) times daily as needed. (Patient taking differently: Take 500 mg by mouth 2 (two) times daily. 1/2 tablet twice a day) 90 tablet 11    pravastatin (PRAVACHOL) 40 MG tablet Take 1 tablet (40 mg total) by mouth once daily. 90 tablet 3    RABEprazole (ACIPHEX) 20 mg tablet Take 1 tablet (20 mg total) by mouth 2 (two) times daily. 180 tablet 3    selegiline (EMSAM) 12 mg/24 hr Place 1 new patch onto the skin once daily. 90 patch 3    senna-docusate 8.6-50 mg (PERICOLACE) 8.6-50 mg per tablet Take 2 tablets by mouth nightly as needed for Constipation.      sucralfate (CARAFATE) 1 gram tablet TAKE 1 TABLET BY MOUTH 4 TIMES A  tablet 4    tadalafil (CIALIS) 5 MG tablet Take 1 tablet (5 mg total) by mouth once daily. 30 tablet 12    traZODone (DESYREL) 100 MG tablet Take 1 tablet (100 mg total) by mouth every evening. 90 tablet 3     No current facility-administered medications for this visit.        Family History   Problem Relation Age of  Onset    Cancer Mother         colon; uterine    Nephrolithiasis Mother     Stroke Mother 86    Pancreatitis Brother     Vision loss Brother         macular degeneration    Diabetes Brother     Macular degeneration Brother     Migraines Sister     No Known Problems Father     No Known Problems Maternal Grandmother     No Known Problems Maternal Grandfather     No Known Problems Paternal Grandmother     No Known Problems Paternal Grandfather     No Known Problems Sister     No Known Problems Maternal Aunt     No Known Problems Maternal Uncle     No Known Problems Paternal Aunt     No Known Problems Paternal Uncle     Melanoma Neg Hx     Amblyopia Neg Hx     Blindness Neg Hx     Cataracts Neg Hx     Glaucoma Neg Hx     Hypertension Neg Hx     Retinal detachment Neg Hx     Strabismus Neg Hx     Thyroid disease Neg Hx     Allergic rhinitis Neg Hx     Allergies Neg Hx     Angioedema Neg Hx     Asthma Neg Hx     Eczema Neg Hx     Urticaria Neg Hx     Rhinitis Neg Hx     Immunodeficiency Neg Hx     Atopy Neg Hx        Social History     Socioeconomic History    Marital status:      Spouse name: Not on file    Number of children: Not on file    Years of education: Not on file    Highest education level: Not on file   Occupational History    Occupation: Psychotherpist    Social Needs    Financial resource strain: Not hard at all    Food insecurity     Worry: Never true     Inability: Never true    Transportation needs     Medical: No     Non-medical: No   Tobacco Use    Smoking status: Never Smoker    Smokeless tobacco: Never Used   Substance and Sexual Activity    Alcohol use: Yes     Frequency: 4 or more times a week     Drinks per session: 1 or 2     Binge frequency: Never     Comment: occasion    Drug use: No    Sexual activity: Yes     Partners: Female   Lifestyle    Physical activity     Days per week: 0 days     Minutes per session: 0 min    Stress: To  some extent   Relationships    Social connections     Talks on phone: More than three times a week     Gets together: Twice a week     Attends [employer] service: Patient refused     Active member of club or organization: Patient refused     Attends meetings of clubs or organizations: Patient refused     Relationship status:    Other Topics Concern    Not on file   Social History Narrative    Not on file              Objective:        GEN: Well developed, well nourished. No acute distress. Fully alert, oriented, and appropriate. Speech normal sudeep.   PSYCH: Normal affect. Thought content appropriate.  CHEST: Breathing symmetric. No audible wheezing.  SKIN: Warm, dry. No rash or discoloration on exposed areas.   NEURO/MUSCULOSKELETAL:  Cervical: ROM full and painless; TTP neck and shoulder musculature ; 5/5 UE strength; gross sensation and reflexes intact bilaterally; Negative De Leon's bilaterally.  Lumbar: ROM limited and painful. TTP lumbar musculature; bilateral foot drop, R>L; gross sensation and reflexes intact bilaterally; negative clonus bilaterally  SLR negative bilaterally (sitting)  ABDIEL negative bilaterally (sitting)         Imaging:      Narrative & Impression     EXAMINATION:  XR LUMBAR SPINE AP AND LATERAL     CLINICAL HISTORY:  L/S-spine fusion, follow up;Fusion of spine, lumbosacral region     TECHNIQUE:  AP, lateral and spot images were performed of the lumbar spine.  These views were obtained with the patient standing increasing soft tissue attenuation and decreasing bony detail.     COMPARISON:  Prior studies dated 22 July 2020, 29 July 2015, 22 June 2015, 9 November 2012 and 18 April 2011.     FINDINGS:  Large amount of bowel gas and fecal material is demonstrated further obscuring bony detail.  There are interval postoperative findings at the L5-S1 level with placement of metal spacer containing fixation screws.  There has also been interval placement of left-sided nephrostomy  tube.     Compared to the most recent radiographs, there has been no interval change in the height, alignment or overall radiographic appearance of the lumbar vertebral bodies and disc spaces otherwise with stable postoperative findings at the L3-L4 and L4-L5 levels.  Vertebroplasty is again demonstrated at T12 with unchanged height and configuration of this vertebral body.     Impression:     Interval postoperative findings at L5-S1 and placement of left nephrostomy tube with otherwise stable radiographic appearance of the lumbar spine.        Electronically signed by: Jason Sexton MD  Date:                                            08/21/2020  Time:                                           12:25        Narrative & Impression     EXAMINATION:  MRI LUMBAR SPINE W WO CONTRAST     CLINICAL HISTORY:  Lumbosacral osteoarthritis;L5-S1 degenerative disc disease; Spondylosis without myelopathy or radiculopathy, lumbosacral region     TECHNIQUE:  Multiplanar, multisequence MR images were acquired from the thoracolumbar junction to the sacrum without the administration of contrast.     COMPARISON:  11/15/2018     FINDINGS:  Redemonstration of posterior instrumented fusion with disc spacer spanning L3 through S5.  There is similar degree of anterior wedging at L1 with prior vertebral augmentation at T12.  There is disc desiccation at L5-S1 with Modic type 1 endplate changes/edema at L5-S1, new from prior.  Intervertebral disc gas also noted at this level.  Otherwise, no infiltrative marrow process.  Spinal cord terminus lies L1-L2.  Visualized prevertebral and paraspinal soft tissues demonstrate no acute abnormality.  Post-contrast images demonstrate no abnormal paraspinal collection or nerve root enhancement.     T12-L1: No significant posterior disc herniation, spinal canal stenosis, or neural foraminal impingement.     L1-L2: No significant spinal canal stenosis or neural foraminal narrowing.     L2-L3: Facet DJD with  mild circumferential bulge and flavum thickening.  No significant central canal stenosis or neural foraminal impingement.     L3-L5: Postoperative change.  Allowing for metallic artifact, no significant central canal stenosis or neural foraminal impingement.     L5-S1: Circumferential bulge with facet DJD.  No significant central canal stenosis.  At least moderate neural foraminal narrowing with possible encroachment of the right exiting L5 nerve root.     Impression:     Posterior instrumented fusion spanning L3 through L5.     Spondylosis change at L5-S1, progressed from 2018 with associated Modic type 1 endplate edema, likely inflammatory in nature.  At least moderate bilateral neural foraminal narrowing at this level with possible encroachment of the right exiting L5 nerve root.        Electronically signed by: Ranulfo Holman  Date:                                            07/22/2020         Assessment:     Encounter Diagnoses   Name Primary?    Chronic pain syndrome     Polyneuropathy     Decreased activities of daily living (ADL)     Pain aggravated by activities of daily living     Impaired functional mobility and endurance     Chronic pain disorder Yes    S/P lumbar spinal fusion     Depression, unspecified depression type     Chronic migraine without aura without status migrainosus, not intractable     Bilateral foot-drop        Plan:     Diagnoses and all orders for this visit:    Chronic pain disorder    Chronic pain syndrome  -     Ambulatory referral/consult to [facility]    Polyneuropathy  -     Ambulatory referral/consult to [facility]    Decreased activities of daily living (ADL)    Pain aggravated by activities of daily living    Impaired functional mobility and endurance    S/P lumbar spinal fusion    Depression, unspecified depression type    Chronic migraine without aura without status migrainosus, not intractable    Bilateral  foot-drop        Raffy Rutherford Jr. is a 70 y.o. male with chronic back pain, polyneuropathy, foot drop, HAs. Over the years he has tried PT, medications, injections and surgery to help with pain, but pain does still continue to impact his function and QOL.     We discussed that chronic pain impacts the whole person. We discussed that the [facility] provides multidisciplinary treatment to address this. The program will provide education and training aimed at improving physical function, emotional health, stress and pain coping skills.The program is designed to build confidence in physical activity and ADLs and in your ability to control and manage your pain.     Raffy Rutherford Jr. will benefit from a multidisciplinary approach to managing his chronic pain to help with pain, emotional and physical health and wellness.    At this time, he is s/p lumbar fusion surgery 8/20/20 and in PT. We will need to wait at least until he completes PT to consider FRP. Will check-in with pt after he completes PT.       I spent a total of 60 minutes with the patient, and greater than 50% of the time was spent in counseling and education.     The above plan and management options were discussed at length with patient. Patient is in agreement with the above and verbalized understanding. It will be communicated with the referring physician via electronic record, fax, or mail.

## 2020-10-14 NOTE — LETTER
October 16, 2020      Jason Caldwell MD  [address]  [address]  [facility] [address]           [id number]  [address], [address]  [facility] [address]  Phone: [phone]  Fax: [phone]          Patient: Raffy Rutherford Jr.   MR Number: [id number]   YOB: 1950   Date of Visit: 10/14/2020       Dear Dr. Jason Caldwell:    Thank you for referring Raffy Rutherford to me for evaluation. Attached you will find relevant portions of my assessment and plan of care.    If you have questions, please do not hesitate to call me. I look forward to following Raffy Rutherford along with you.    Sincerely,    Faith Leon, NP    Enclosure  CC:  No Recipients    If you would like to receive this communication electronically, please contact [email or web address] or [phone] to request more information on [facility] access.    For providers and/or their staff who would like to refer a patient to [facility], please contact us through our one-stop-shop provider referral line, [facility] [place], at [phone].    If you feel you have received this communication in error or would no longer like to receive these types of communications, please e-mail [email or web address]

## 2020-10-16 ENCOUNTER — PATIENT MESSAGE (OUTPATIENT)
Dept: ORTHOPEDICS | Facility: CLINIC | Age: 70
End: 2020-10-16

## 2020-10-16 ENCOUNTER — TELEPHONE (OUTPATIENT)
Dept: ORTHOPEDICS | Facility: CLINIC | Age: 70
End: 2020-10-16

## 2020-10-16 DIAGNOSIS — M79.642 BILATERAL HAND PAIN: Primary | ICD-10-CM

## 2020-10-16 DIAGNOSIS — M79.641 BILATERAL HAND PAIN: Primary | ICD-10-CM

## 2020-10-19 ENCOUNTER — OFFICE VISIT (OUTPATIENT)
Dept: ORTHOPEDICS | Facility: CLINIC | Age: 70
End: 2020-10-19
Payer: COMMERCIAL

## 2020-10-19 ENCOUNTER — HOSPITAL ENCOUNTER (OUTPATIENT)
Dept: RADIOLOGY | Facility: OTHER | Age: 70
Discharge: HOME | End: 2020-10-19
Attending: ORTHOPAEDIC SURGERY
Payer: COMMERCIAL

## 2020-10-19 ENCOUNTER — PATIENT MESSAGE (OUTPATIENT)
Dept: NEUROLOGY | Facility: CLINIC | Age: 70
End: 2020-10-19

## 2020-10-19 VITALS
WEIGHT: 176 LBS | SYSTOLIC BLOOD PRESSURE: 114 MMHG | HEART RATE: 60 BPM | HEIGHT: 68 IN | DIASTOLIC BLOOD PRESSURE: 72 MMHG | BODY MASS INDEX: 26.67 KG/M2

## 2020-10-19 DIAGNOSIS — M79.641 BILATERAL HAND PAIN: ICD-10-CM

## 2020-10-19 DIAGNOSIS — M79.642 BILATERAL HAND PAIN: ICD-10-CM

## 2020-10-19 DIAGNOSIS — G56.00 CARPAL TUNNEL SYNDROME, UNSPECIFIED LATERALITY: Primary | ICD-10-CM

## 2020-10-19 PROCEDURE — 73130 X-RAY EXAM OF HAND: CPT | Mod: 26,50,, | Performed by: RADIOLOGY

## 2020-10-19 PROCEDURE — 1101F PR PT FALLS ASSESS DOC 0-1 FALLS W/OUT INJ PAST YR: ICD-10-PCS | Mod: CPTII,S$GLB,, | Performed by: ORTHOPAEDIC SURGERY

## 2020-10-19 PROCEDURE — 1101F PT FALLS ASSESS-DOCD LE1/YR: CPT | Mod: CPTII,S$GLB,, | Performed by: ORTHOPAEDIC SURGERY

## 2020-10-19 PROCEDURE — 73130 X-RAY EXAM OF HAND: CPT | Mod: TC,50,FY

## 2020-10-19 PROCEDURE — 1125F AMNT PAIN NOTED PAIN PRSNT: CPT | Mod: S$GLB,,, | Performed by: ORTHOPAEDIC SURGERY

## 2020-10-19 PROCEDURE — 99999 PR PBB SHADOW E&M-EST. PATIENT-LVL III: CPT | Mod: PBBFAC,,, | Performed by: ORTHOPAEDIC SURGERY

## 2020-10-19 PROCEDURE — 99213 PR OFFICE/OUTPT VISIT, EST, LEVL III, 20-29 MIN: ICD-10-PCS | Mod: S$GLB,,, | Performed by: ORTHOPAEDIC SURGERY

## 2020-10-19 PROCEDURE — 99213 OFFICE O/P EST LOW 20 MIN: CPT | Mod: S$GLB,,, | Performed by: ORTHOPAEDIC SURGERY

## 2020-10-19 PROCEDURE — 73130 XR HAND COMPLETE 3 VIEWS BILATERAL: ICD-10-PCS | Mod: 26,50,, | Performed by: RADIOLOGY

## 2020-10-19 PROCEDURE — 3008F BODY MASS INDEX DOCD: CPT | Mod: CPTII,S$GLB,, | Performed by: ORTHOPAEDIC SURGERY

## 2020-10-19 PROCEDURE — 1159F MED LIST DOCD IN RCRD: CPT | Mod: S$GLB,,, | Performed by: ORTHOPAEDIC SURGERY

## 2020-10-19 PROCEDURE — 1125F PR PAIN SEVERITY QUANTIFIED, PAIN PRESENT: ICD-10-PCS | Mod: S$GLB,,, | Performed by: ORTHOPAEDIC SURGERY

## 2020-10-19 PROCEDURE — 3008F PR BODY MASS INDEX (BMI) DOCUMENTED: ICD-10-PCS | Mod: CPTII,S$GLB,, | Performed by: ORTHOPAEDIC SURGERY

## 2020-10-19 PROCEDURE — 1159F PR MEDICATION LIST DOCUMENTED IN MEDICAL RECORD: ICD-10-PCS | Mod: S$GLB,,, | Performed by: ORTHOPAEDIC SURGERY

## 2020-10-19 PROCEDURE — 99999 PR PBB SHADOW E&M-EST. PATIENT-LVL III: ICD-10-PCS | Mod: PBBFAC,,, | Performed by: ORTHOPAEDIC SURGERY

## 2020-10-19 NOTE — PROGRESS NOTES
"[facility]  History & Physical  Orthopedics    SUBJECTIVE:      Chief Complaint:  Left wrist pain    Referring Provider: No ref. provider found     History of Present Illness:  Patient is a 70 y.o. right hand dominant male who presents today with complaints of left wrist pain. He reports that at the end of January, 2018 he was vacationing in [facility] when he sustained a low energy fall onto his left wrist. He was found to have an intra-articular minimally displaced distal radius fracture upon follow-up in the [place] and was treated by closed means for his fracture. He was treated with initial casting and then a removable brace which was discontinued approximately 3 weeks ago per his report.  He has been attending occupational therapy for range of motion and exercises.  He notes that he has plateaued in his recovery and feels as if he is not making much progress and is still experiencing some pain which he rates 4 to 6/10.  Of note he does have a history of Dupuytren's disease and he reports that his left small finger seems to be rotating in abnormally.  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  He reports that his pain is global but does identify an area at the volar aspect of the wrist and the ulnar aspect of the wrist which are painful.  He presents for evaluation at the request of 1 of our physician's assistants.    Interval History: Patient presents today for follow up. He is recovering well after his wrist injury. He is back to playing guitar. He states that he has full ROM and strength in his wrist and fingers while playing, though he does notes a "strange sensation" in the ulnar aspect of his hand while playing that he says is non-painful. He is happy so far with the progress of his recovery. He has some slight TTP at the ulnar styloid and volar radial wrist, but this is improving. He states that he went out of town " for a few weeks so was lost to follow up with OT and is hoping to get back into therapy.     Interval History 10/19/20:  Patient presents today for repeat presentation of his left Dupuytren's and newly diagnosed bilateral carpal tunnel syndrome.  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  At patient's last visit we had planned to observe his Dupuytren's and was not having carpal tunnel syndrome symptoms.  Endorses he has had acute worsening of the numbness throughout all digits of his bilateral hands (L>R) with associated left hand weakness for the past few months.  He is coping with his symptoms with mild improvement with bilateral nighttime carpal tunnel wrist braces and Voltaren gel provided by his neurologist but is still having significant weakness that is hindering him from his music. Also has had L5/S1 ALIF 7 weeks ago by Dr. Caldwell for severe degenerative disc disease and foraminal stenosis which he is progressing well.     The patient is a/an musician, psychotherapist, .    Onset of symptoms/DOI was nearly 4 months ago.    Symptoms are aggravated by activity and movement.    Symptoms are alleviated by rest.    Symptoms consist of pain.    The patient rates their pain as a 4/10.    Attempted treatment(s) and/or interventions include rest, activity modification, anti-inflammatory medications, physical and/or occupational therapy, immobilization and splinting/casting.     The patient denies any fevers, chills, N/V, D/C and presents for evaluation.       Past Medical History:   Diagnosis Date    Adenomatous polyp 2003    Adenomatous polyp     Allergy     Arthritis     Back pain     after trauma beginning in 195    Cataract     Chronic pain     neck and left shoulder    Cluster headache 2013     Colon polyp     Degenerative disc disease     Depression     Diverticulitis 12/2013    Elevated PSA     Fibromyalgia 2013    GERD (gastroesophageal reflux disease)     Hepatitis 1970's    A    History of prostate biopsy 2002    Hyperlipidemia     Joint pain     Sleep apnea     Special screening for malignant neoplasms, colon 5/5/2014    Thyroid nodule 7/16/2014    Visual disturbance 2012    problems after cataract surgery     Past Surgical History:   Procedure Laterality Date    BACK SURGERY      CATARACT EXTRACTION W/  INTRAOCULAR LENS IMPLANT Bilateral     CHOLECYSTECTOMY      COLONOSCOPY N/A 12/17/2019    Procedure: COLONOSCOPY;  Surgeon: Amadou Hardin MD;  Location: [facility] (4TH FLR);  Service: Endoscopy;  Laterality: N/A;  pt request to do Miralax bowel prep-BB    COSMETIC SURGERY  2/10/2015    Direct mid-forehead brow lift    COSMETIC SURGERY  2/10/2015    Bilateral upper lid blepahroplasty    CYSTOSCOPY WITH URETEROSCOPY, RETROGRADE PYELOGRAPHY, AND INSERTION OF STENT Left 8/19/2020    Procedure: CYSTOSCOPY, WITH RETROGRADE PYELOGRAM AND URETERAL STENT INSERTION;  Surgeon: Katelynn George MD;  Location: [facility] OR;  Service: Urology;  Laterality: Left;    ESOPHAGOGASTRODUODENOSCOPY N/A 12/17/2019    Procedure: ESOPHAGOGASTRODUODENOSCOPY (EGD);  Surgeon: Amadou Hardin MD;  [facility]  Service: Endoscopy;  Laterality: N/A;    EYE SURGERY      FUSION OF LUMBAR SPINE BY ANTERIOR APPROACH N/A 8/20/2020    Procedure: FUSION, SPINE, LUMBAR, ANTERIOR APPROACH L5-S1 ALIF Stand Alone;  Surgeon: Jason Caldwell MD;  Location: [facility] OR;  Service: Neurosurgery;  Laterality: N/A;    HEMORRHOID SURGERY      with complication of chronic bleeding for 6 weeks     INJECTION OF STEROID Right 12/6/2018    Procedure: INJECTION, STEROID Right SI Joint Block and Steroid Injection;  Surgeon: Jason Cadlwell MD;  Location: [facility] OR;  Service: Neurosurgery;  Laterality: Right;  Procedure:  Right SI Joint Block and Steroid Injection  Surgery Time: 30 MIN  LOS: 0  Anesthesia: MAC  Radiology: C-arm  Bed: Favian 4 Poster  Position: Prone    INJECTION OF STEROID Right 9/19/2019    Procedure: INJECTION, STEROID Procedure: Right SI joint block nd steroid injection;  Surgeon: Jason Caldwell MD;  Location: [facility];  Service: Neurosurgery;  Laterality: Right;  Procedure: Right SI joint block nd steroid injection  Surgery Time: 30 mins  LOS:   Anesthesia: General MAC  Radiology:C-arm  Bed: Regular Bed  Position: Prone    JOINT REPLACEMENT      KNEE SURGERY      involving arthroscopic surgery to both knees    SINUS SURGERY      SINUS SURGERY      left molar and sinus surgery for trigeminal neuralgia    SPINAL FUSION  6/22/2015    L3-L5 XLIF    SPINE SURGERY  6/22/15    XLIF/TANA    TOTAL HIP ARTHROPLASTY  4/2012    Pt states he had total hip replacement on his left hip.     Review of patient's allergies indicates:   Allergen Reactions    Alphagan [brimonidine]      Patient taking MASO-B Selective Inhibitor Selegiline (Emsam)    Coumadin [warfarin]      itch    Oxycodone      hiccups     Social History     Social History Narrative    Not on file     Family History   Problem Relation Age of Onset    Cancer Mother         colon; uterine    Nephrolithiasis Mother     Stroke Mother 86    Pancreatitis Brother     Vision loss Brother         macular degeneration    Diabetes Brother     Macular degeneration Brother     Migraines Sister     No Known Problems Father     No Known Problems Maternal Grandmother     No Known Problems Maternal Grandfather     No Known Problems Paternal Grandmother     No Known Problems Paternal Grandfather     No Known Problems Sister     No Known Problems Maternal Aunt     No Known Problems Maternal Uncle     No Known Problems Paternal Aunt     No Known Problems Paternal Uncle     Melanoma Neg Hx     Amblyopia Neg Hx     Blindness Neg Hx     Cataracts  Neg Hx     Glaucoma Neg Hx     Hypertension Neg Hx     Retinal detachment Neg Hx     Strabismus Neg Hx     Thyroid disease Neg Hx     Allergic rhinitis Neg Hx     Allergies Neg Hx     Angioedema Neg Hx     Asthma Neg Hx     Eczema Neg Hx     Urticaria Neg Hx     Rhinitis Neg Hx     Immunodeficiency Neg Hx     Atopy Neg Hx          Current Outpatient Medications:     butalbital-acetaminophen-caffeine -40 mg (FIORICET, ESGIC) -40 mg per tablet, Take 1 tablet by mouth 6 hours as needed for Headaches., Disp: 30 tablet, Rfl: 0    clonazePAM (KLONOPIN) 0.5 MG tablet, Take 2 tablets by mouth at bedtime and 1 tablet as needed for tinnitus, Disp: 75 tablet, Rfl: 5    cyanocobalamin 1,000 mcg/mL injection, , Disp: , Rfl:     diclofenac (VOLTAREN) 50 MG EC tablet, Take 1 tablet (50 mg total) by mouth 2 (two) times daily as needed (for headache, take with food if possible)., Disp: 8 tablet, Rfl: 3    diclofenac potassium (CAMBIA) 50 mg PwPk, Take 50 mg by mouth daily as needed., Disp: 9 each, Rfl: 3    diclofenac sodium (VOLTAREN) 1 % Gel, Apply 2 g topically once daily., Disp: 100 g, Rfl: 0    galcanezumab-gnlm (EMGALITY PEN) 120 mg/mL PnIj, Inject 120 mg into the skin every 28 days., Disp: 4 mL, Rfl: 3    HYDROcodone-acetaminophen (NORCO) 7.5-325 mg per tablet, Take 1 tablet by mouth every 8 (eight) hours as needed for Pain. (Patient taking differently: Take 1 tablet by mouth 3 (three) times daily. 1/2 tablet 3 times a day.), Disp: 21 tablet, Rfl: 0    lamoTRIgine (LAMICTAL) 200 MG tablet, Take 1 tablet (200 mg total) by mouth once daily., Disp: 90 tablet, Rfl: 3    methocarbamoL (ROBAXIN) 750 MG Tab, Take 1 tablet (750 mg total) by mouth 3 (three) times daily as needed. (Patient taking differently: Take 500 mg by mouth 2 (two) times daily. 1/2 tablet twice a day), Disp: 90 tablet, Rfl: 11    pravastatin (PRAVACHOL) 40 MG tablet, Take 1 tablet (40 mg total) by mouth once daily., Disp: 90  "tablet, Rfl: 3    RABEprazole (ACIPHEX) 20 mg tablet, Take 1 tablet (20 mg total) by mouth 2 (two) times daily., Disp: 180 tablet, Rfl: 3    selegiline (EMSAM) 12 mg/24 hr, Place 1 new patch onto the skin once daily., Disp: 90 patch, Rfl: 3    senna-docusate 8.6-50 mg (PERICOLACE) 8.6-50 mg per tablet, Take 2 tablets by mouth nightly as needed for Constipation., Disp: , Rfl:     sucralfate (CARAFATE) 1 gram tablet, TAKE 1 TABLET BY MOUTH 4 TIMES A DAY, Disp: 120 tablet, Rfl: 4    tadalafil (CIALIS) 5 MG tablet, Take 1 tablet (5 mg total) by mouth once daily., Disp: 30 tablet, Rfl: 12    traZODone (DESYREL) 100 MG tablet, Take 1 tablet (100 mg total) by mouth every evening., Disp: 90 tablet, Rfl: 3      Review of Systems:  Constitutional: no fever or chills  Eyes: no visual changes  ENT: no nasal congestion or sore throat  Respiratory: no cough or shortness of breath  Cardiovascular: no chest pain  Gastrointestinal: no nausea or vomiting, tolerating diet  Musculoskeletal: pain, soreness and decreased ROM    OBJECTIVE:      Vital Signs (Most Recent):  Vitals:    10/19/20 1413   BP: 114/72   Pulse: 60   Weight: 79.8 kg (176 lb)   Height: 5' 8" (1.727 m)     Body mass index is 26.76 kg/m².      Physical Exam:  Constitutional: The patient appears well-developed and well-nourished. No distress.   Head: Normocephalic and atraumatic.   Nose: Nose normal.   Eyes: Conjunctivae and EOM are normal.   Neck: No tracheal deviation present.   Cardiovascular: Normal rate and intact distal pulses.    Pulmonary/Chest: Effort normal. No respiratory distress.   Abdominal: There is no guarding.   Neurological: The patient is alert.   Psychiatric: The patient has a normal mood and affect.     Left Hand/Wrist Examination:    Observation/Inspection:  Swelling  none    Deformity  none  Discoloration  none     Scars   none    Atrophy  No thenar atrophy bilaterally  There are some Dupuytren's cords throughout the patient's left hand to " the small long and ring fingers.  There is no significant contracture of the MCP or PIP joints associated with this.    HAND/WRIST EXAMINATION:  Finkelstein's Test   Neg  WHAT Test    Neg  Snuff box tenderness   Neg  Ochoa's Test    Neg  Hook of Hamate Tenderness  Neg  CMC grind    Neg  Circumduction test   Neg    Neurovascular Exam:  Digits WWP, brisk CR < 3s throughout  NVI motor/LTS to M/R/U nerves, radial pulse 2+  Tinel's Test - Carpal Tunnel  Positive bilaterally  Tinel's Test - Cubital Tunnel  Positive bilaterally    ROM hand/wrist/elbow full, painless.  Able to make a full composite fist without extensor lag.  4/5 strength of FPL on the left, but 5/5 strength father flexors and extensors throughout bilateral hands.  Able to interdigitate his thumb and small finger, and able to oppose the thumb to the base of the small finger bilaterally.  Paresthesias in the median nerve distribution on the left, otherwise sensation intact to light touch throughout.      Diagnostic Results:     Xray - bilateral wrists completed today without any acute fractures or dislocations    MRI left wrist on 5/2019-Partially comminuted, nondisplaced distal radial fracture with intra-articular extension and minimal step-off.  Focal tear of the TFCC near the radial attachment.  Sprain/partial tear of the scapholunate ligament central component.  Dorsal and volar bands appear intact.  Tendinosis of the ECU.    EMG - severe bilateral carpal tunnel syndrome    ASSESSMENT/PLAN:      70 y.o. yo male with a left Dupuytren's and severe bilateral carpal tunnel syndrome.   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  Therefore, recommended continuing bilateral carpal tunnel braces and addition of occupational therapy with re-evaluation in 3 months to determine if patient would benefit from surgical intervention.  1) bilateral carpal tunnel braces  2) occupational therapy  3) return to clinic in 3 months        Addi Bauer M.D.     Please be aware that this note has been generated with the assistance of [employer] voice-to-text.  Please excuse any spelling or grammatical errors.

## 2020-10-21 RX ORDER — PREGABALIN 50 MG/1
50 CAPSULE ORAL 3 TIMES DAILY
Qty: 270 CAPSULE | Refills: 3 | Status: SHIPPED | OUTPATIENT
Start: 2020-10-21 | End: 2020-12-07

## 2020-11-04 ENCOUNTER — CLINICAL SUPPORT (OUTPATIENT)
Dept: REHABILITATION | Facility: HOSPITAL | Age: 70
End: 2020-11-04
Attending: NEUROLOGICAL SURGERY
Payer: COMMERCIAL

## 2020-11-04 DIAGNOSIS — M79.642 PAIN IN BOTH HANDS: ICD-10-CM

## 2020-11-04 DIAGNOSIS — G56.00 CARPAL TUNNEL SYNDROME, UNSPECIFIED LATERALITY: ICD-10-CM

## 2020-11-04 DIAGNOSIS — M79.641 PAIN IN BOTH HANDS: ICD-10-CM

## 2020-11-04 PROCEDURE — 97165 OT EVAL LOW COMPLEX 30 MIN: CPT

## 2020-11-04 PROCEDURE — 97110 THERAPEUTIC EXERCISES: CPT

## 2020-11-04 NOTE — PATIENT INSTRUCTIONS
[facility], OCCUPATIONAL THERAPY  HOME EXERCISE PROGRAM       Complete 7 repetitions of each exercise 4 times a day.              1. Extend your arm in front of you keeping your elbow straight, and bend the wrist and fingers.  2. Extend your wrist and fingers.  3. Bend your elbow.  4. Bring your arm out to the side, bend your wrist and fingers.  5. Rotate your arm so that your palm is facing behind you.  6. Tilt your head to the opposite side.        If any of these exercises aggravate your hands, stop, rest and try returning again to the previous exercise performed without pain. Proceed at your own pace using pain tolerance as your guide.          MEDIAN NERVE GLIDING    Complete 10 repetitions of each exercise 4-6 times a day.      1. Make a fist, keep the wrist straight.  2. Straighten the fingers, keep the wrist straight.  3. Gently bend the wrist back, keep the thumb close to the fingers.  4. Extend the thumb out.  5. Turn forearm so palm is facing up.  6. Gently stretch the thumb out using the other hand.    If any of these exercises aggravate your hands, stop, rest and try returning again to the previous exercise performed without pain. Proceed at your own pace using pain tolerance as your guide.

## 2020-11-04 NOTE — PLAN OF CARE
[facility] Occupational Therapy  Initial Evaluation     Date: 11/4/2020  Name: Raffy Rutherford Jr.  Clinic Number: [id number]    Therapy Diagnosis:   Encounter Diagnoses   Name Primary?    Carpal tunnel syndrome, unspecified laterality     Pain in both hands      Physician: Antonio Kilpatrick MD    Physician Orders: eval and treat  Medical Diagnosis: Carpal tunnel syndrome, unspecified laterality [G56.00]  Surgical Procedure and Date: none / Date of Injury/Onset: ~6 months  Evaluation Date: 11/4/2020  Insurance Authorization Period Expiration: 12/31/2020  Plan of Care Certification Period: 12/18/2020  Date of Return to MD: 1/25/2020    Visit #  Visits authorized: 1 / 20    FOTO: initial eval      Time In: 2:05 pm  Time Out: 2:45 pm  Total treatment time: 40 minutes  Total Timed minutes 8 minutes    Precautions:  Standard    Subjective     Involved Side: bilateral  Dominant Side: Right  Mechanism of Injury: insidious onset, chronic  History of Current Condition: Pt has had pain and numbness in hands for about 6 months. Pt would like to try conservative treatment prior to surgery.   Surgical Procedure: none  Imaging: see chart  Previous Therapy: reports has had therapy in the past for other issues    Past Medical History/Physical Systems Review:   Raffy Rutherford Jr.  has a past medical history of Adenomatous polyp, Adenomatous polyp, Allergy, Arthritis, Back pain, Cataract, Chronic pain, Cluster headache, Colon polyp, Degenerative disc disease, Depression, Diverticulitis, Elevated PSA, Fibromyalgia, GERD (gastroesophageal reflux disease), Hepatitis, History of prostate biopsy, Hyperlipidemia, Joint pain, Sleep apnea, Special screening for malignant neoplasms, colon, Thyroid nodule, and Visual disturbance.    Raffy Langey   has a past surgical history that includes Sinus surgery; Knee surgery; Hemorrhoid surgery; Cholecystectomy; Sinus surgery; Eye surgery; Back surgery; Joint replacement;  Total hip arthroplasty (4/2012); Cosmetic surgery (2/10/2015); Cosmetic surgery (2/10/2015); Spine surgery (6/22/15); Cataract extraction w/  intraocular lens implant (Bilateral); Spinal fusion (6/22/2015); Injection of steroid (Right, 12/6/2018); Injection of steroid (Right, 9/19/2019); Esophagogastroduodenoscopy (N/A, 12/17/2019); Colonoscopy (N/A, 12/17/2019); Cystoscopy with ureteroscopy, retrograde pyelography, and insertion of stent (Left, 8/19/2020); and Fusion of lumbar spine by anterior approach (N/A, 8/20/2020).    Raffy has a current medication list which includes the following prescription(s): butalbital-acetaminophen-caffeine -40 mg, clonazepam, cyanocobalamin, diclofenac, cambia, diclofenac sodium, emgality pen, hydrocodone-acetaminophen, lamotrigine, methocarbamol, pravastatin, pregabalin, rabeprazole, selegiline, senna-docusate 8.6-50 mg, sucralfate, tadalafil, and trazodone.    Review of patient's allergies indicates:   Allergen Reactions    Alphagan [brimonidine]      Patient taking MASO-B Selective Inhibitor Selegiline (Emsam)    Coumadin [warfarin]      itch    Oxycodone      hiccups        Patient's Goals for Therapy: to be able to play his instruments    Pt reports he is having trouble playing music and has issues with fine motor skills recently.    Pain:  Functional Pain Scale Rating 0-10:   5/10 on average  3/10 at best  7/10 at worst  Location: B volar wrists and finger tips  Description: Burning, Tingling and Shooting  Aggravating Factors: Flexing and gripping  Easing Factors: rest and brace wear    Occupation:  Pt is a musician, he is also a counselor and an   Working presently: employed  Duties: some computer use and writing, using the phone.Pt has been unable to play his music much lately.    Functional Limitations/Social History:    Previous functional status includes: Independent with all ADLs.     Current FunctionalStatus   Home/Living environment : lives with their  spouse      Limitation of Functional Status as follows:   ADLs/IADLs:     - Feeding: I    - Bathing: I    - Dressing/Grooming: difficulty with buttons    - Driving: I    -limited with playing music, guitar, bass, and piano    -picking up things, feels he drops things     Leisure: has been significantly limited with playing his instruments        Objective     Observation/Appearance: cording/Dupuytren's noted in B hands, worse in L.  No edema noted      Elbow and Wrist ROM. Measured in degrees.   11/5/2020 11/5/2020    Left Right   Elbow Ext/Flex     Supination/Pronation WNL WNL   Wrist Ext/Flex WNL WNL   Wrist RD/UD WNL WNL     Hand ROM.   Digits WFL, opposition WFL     Strength (Dynamometer) and Pinch Strength (Pinch Gauge)  Measured in pounds.   11/5/2020 11/5/2020    Left Right   Rung II 72 87   Key Pinch 13 18   3pt Pinch 12 13   2pt Pinch 10 11     Sensation   11/4/2020 11/4/2020    Left Right   [place] [employer]     Normal 1.65-2.83     Diminished Light Touch 3.22-3.61 x x   Diminished Protective 3.84-4.31     Loss of Protective 4.56-6.65     Untestable >6.65     2 Point Discrimination     Static     Dynamic       Manual Muscle Test   11/5/2020 11/5/2020    Left Right   Wrist Extension  5/5 5/5   Wrist Flexion 5/5 5/5   Radial Deviation 5/5 5/5   Ulnar Deviation 5/5 5/5   Supination 5/5 5/5   Pronation 5/5 5/5   Elbow Extension 5/5 5/5   Elbow Flexion 5/5 5/5           CMS Impairment/Limitation/Restriction for FOTO Hand Survey    Therapist reviewed FOTO scores for Raffy Rutherford  on 11/4/2020.   FOTO documents entered into [employer] - see Media section.    Limitation Score: 50%         Treatment     Treatment Time In: 2:37  Treatment Time Out: 2:45  Total Treatment time separate from Evaluation time: 8 min        Raffy received therapeutic exercises for 8 minutes including:  -pt educated on and performed HEP including median nerve glides and ulnar nerve glides x 10 reps each.      Home Exercise  Program/Education:  Issued HEP (see patient instructions in EMR) and educated on modality use for pain management . Exercises were reviewed and Raffy was able to demonstrate them prior to the end of the session.   Pt received a written copy of exercises to perform at home. Raffy demonstrated good  understanding of the education provided.  Pt was advised to perform these exercises free of pain, and to stop performing them if pain occurs.    Patient/Family Education: role of OT, goals for OT, scheduling/cancellations - pt verbalized understanding. Discussed insurance limitations with patient.    Additional Education provided: discussed limiting gripping activities and modifying tasks    Assessment     Raffy Rutherford Jr. is a 70 y.o. male referred to outpatient occupational therapy and presents with a medical diagnosis of B CTS and cubital tunnel in L, resulting in pain, numbness, decreased dexterity, and demonstrates limitations as described in the chart below. Following medical record review it is determined that pt will benefit from occupational therapy services in order to maximize pain free and/or functional use of bilateral hands. The following goals were discussed with the patient and patient is in agreement with them as to be addressed in the treatment plan. The patient's rehab potential is Fair.     Anticipated barriers to occupational therapy: other comorbidities, chronic dx  Pt has no cultural, educational or language barriers to learning provided.    Profile and History Assessment of Occupational Performance Level of Clinical Decision Making Complexity Score   Occupational Profile:   Raffy Rutherford Jr. is a 70 y.o. male who lives with their spouse and is currently employed as a counselor. Raffy Rutherford Jr. has difficulty with  driving/transportation management, phone/computer use and housework/household chores  affecting his/her daily functional abilities. His/her main goal for therapy is to get back to  playing his instruments.     Comorbidities:   depression and cervical issues    Medical and Therapy History Review:   Expanded               Performance Deficits    Physical:  Muscle Power/Strength  Muscle Endurance   Strength  Pinch Strength  Fine Motor Coordination  Pain    Cognitive:  No Deficits    Psychosocial:    Social Interaction  Habits     Clinical Decision Making:  moderate    Assessment Process:  Detailed Assessments    Modification/Need for Assistance:  Minimal-Moderate Modifications/Assistance    Intervention Selection:  Several Treatment Options       low  Based on PMHX, co morbidities , data from assessments and functional level of assistance required with task and clinical presentation directly impacting function.         Goals:   The following goals were discussed with the patient and patient is in agreement with them as to be addressed in the treatment plan.   Long Term Goals (LTGs); to be met by discharge.  LTG #1: Pt will report a pain level of 1 out of 10 with ADLs and playing his instruments   LTG #2: Pt will demo improved FOTO score by at least 20 points.   LTG #3: Pt will return to prior level of function for ADLs and household management.   LTG #4: Pt will demonstrate improved B  strength by at least 5# for grasping and holding onto objects    Short Term Goals (STGs); to be met within 4 weeks (12/4/2020).  STG #1a: Pt will report 3 out of 10 pain level with playing his instruments.  STG #2: Pt will demonstrate independence with issued HEP.           Plan   Certification Period/Plan of care expiration: 11/4/2020 to 12/18/2020.    Outpatient Occupational Therapy 1 times weekly for 6 weeks to include the following interventions: Paraffin, Fluidotherapy, Manual therapy/joint mobilizations, Modalities for pain management, US 3 mhz, Therapeutic exercises/activities., Strengthening, Orthotic Fabrication/Fit/Training, Edema Control, Joint Protection and Energy Conservation.      Nena  RENA Varma  Date: 11/4/2020      I certify the need for these services furnished under the plan of treatment and while under my care.     ____________________________________________________________________  Physician/Referring Practitioner   Date of Signature

## 2020-11-05 ENCOUNTER — PATIENT MESSAGE (OUTPATIENT)
Dept: NEUROSURGERY | Facility: CLINIC | Age: 70
End: 2020-11-05

## 2020-11-05 ENCOUNTER — TELEPHONE (OUTPATIENT)
Dept: NEUROSURGERY | Facility: CLINIC | Age: 70
End: 2020-11-05

## 2020-11-05 ENCOUNTER — PATIENT MESSAGE (OUTPATIENT)
Dept: PAIN MEDICINE | Facility: OTHER | Age: 70
End: 2020-11-05

## 2020-11-05 PROBLEM — M79.642 PAIN IN BOTH HANDS: Status: ACTIVE | Noted: 2020-11-05

## 2020-11-05 PROBLEM — M79.641 PAIN IN BOTH HANDS: Status: ACTIVE | Noted: 2020-11-05

## 2020-11-06 RX ORDER — PRAVASTATIN SODIUM 40 MG/1
40 TABLET ORAL DAILY
Qty: 90 TABLET | Refills: 3 | Status: SHIPPED | OUTPATIENT
Start: 2020-11-06 | End: 2021-09-28 | Stop reason: SDUPTHER

## 2020-11-07 ENCOUNTER — PATIENT MESSAGE (OUTPATIENT)
Dept: NEUROLOGY | Facility: CLINIC | Age: 70
End: 2020-11-07

## 2020-11-09 ENCOUNTER — CLINICAL SUPPORT (OUTPATIENT)
Dept: REHABILITATION | Facility: HOSPITAL | Age: 70
End: 2020-11-09
Attending: NEUROLOGICAL SURGERY
Payer: COMMERCIAL

## 2020-11-09 DIAGNOSIS — M79.642 PAIN IN BOTH HANDS: ICD-10-CM

## 2020-11-09 DIAGNOSIS — M79.641 PAIN IN BOTH HANDS: ICD-10-CM

## 2020-11-09 PROCEDURE — 97110 THERAPEUTIC EXERCISES: CPT

## 2020-11-09 NOTE — PROGRESS NOTES
"  Occupational Therapy Daily Treatment Note      Date: 11/9/2020  Name: Raffy Rutherford Jr.  Clinic Number: [id number]    Therapy Diagnosis:   Encounter Diagnosis   Name Primary?    Pain in both hands      Physician: Antonio Kilpatrick MD       Physician Orders: eval and treat  Medical Diagnosis: Carpal tunnel syndrome, unspecified laterality [G56.00]  Surgical Procedure and Date: none / Date of Injury/Onset: ~6 months  Evaluation Date: 11/4/2020  Insurance Authorization Period Expiration: 12/31/2020  Plan of Care Certification Period: 12/18/2020  Date of Return to MD: 1/25/2020     Visit #  Visits authorized: 2 / 20     FOTO: initial eval        Time In: 10:30 am  Time Out: 11:00 am  Total treatment time: 30 minutes  Total Timed minutes 25 minutes     Precautions:  Standard      Subjective     Pt reports: "the braces help a little. I wonder if I should wear them during the day."  Pt reports he is considering having surgery.   he was compliant with home exercise program given last session.   Response to previous treatment: no change, good tolerance of HEP  Functional change: cont with pain    Pain: 3/10  Location: bilateral hands/wrists    Objective     Observation/Appearance: cording/Dupuytren's noted in B hands, worse in L.  No edema noted        Elbow and Wrist ROM. Measured in degrees.    11/5/2020 11/5/2020     Left Right   Elbow Ext/Flex       Supination/Pronation WNL WNL   Wrist Ext/Flex WNL WNL   Wrist RD/UD WNL WNL      Hand ROM.   Digits WFL, opposition WFL      Strength (Dynamometer) and Pinch Strength (Pinch Gauge)  Measured in pounds.    11/5/2020 11/5/2020     Left Right   Rung II 72 87   Key Pinch 13 18   3pt Pinch 12 13   2pt Pinch 10 11      Sensation    11/4/2020 11/4/2020     Left Right   [place] [employer]       Normal 1.65-2.83       Diminished Light Touch 3.22-3.61 x x   Diminished Protective 3.84-4.31       Loss of Protective 4.56-6.65       Untestable >6.65       2 Point Discrimination     "   Static       Dynamic          Manual Muscle Test    11/5/2020 11/5/2020     Left Right   Wrist Extension  5/5 5/5   Wrist Flexion 5/5 5/5   Radial Deviation 5/5 5/5   Ulnar Deviation 5/5 5/5   Supination 5/5 5/5   Pronation 5/5 5/5   Elbow Extension 5/5 5/5   Elbow Flexion 5/5 5/5          Treatment consisted of the following:        Raffy received the following supervised modalities after being cleared for contradictions for 5 minutes:   Patient received MH x 5 min to B hands to increase blood flow, circulation and tissue elasticity prior to therex (paraffin not available)      Raffy received therapeutic exercises for 25 minutes including: pt required mod cuing for exercise  -median nerve glides, bilaterally, x 10 reps each  -ulnar nerve glides, L elbow, x 10 reps  -tendon glides x 10 reps bilaterally    Discussed and reviewed wrist brace wear. Pt brought in his 2 types of wrist braces. Reviewed fit of them as well. Pt verbalized understanding of education.      Home Exercises and Education Provided     Education provided: cont HEP. Reviewed wrist brace wear. Reviewed arm positioning and limiting pressure and bending of L elbow.  - Progress towards goals     Written Home Exercises Provided: Patient instructed to cont prior HEP.  Exercises were reviewed and Raffy was able to demonstrate them prior to the end of the session.  Raffy demonstrated good  understanding of the education provided.   .   See EMR under Patient Instructions for exercises provided initial eval.     Assessment     Pt reports he cont to have pain and numbness to B hands. Discussed home program again with pt. Pt cont to present with B hand pain, and numbness, especially in L RF/SF. Pt participated well and is motivated.     Raffy is progressing fairly towards his goals and there are no updates to goals at this time. Pt prognosis continues as Fair. Pt will continue to benefit from skilled outpatient occupational therapy to address the  deficits listed in the problem list on initial evaluation, provide pt/family education and to maximize pt's level of independence in the home and community environment.     Anticipated barriers to continued occupational therapy: chronic nature of dx    Pt's spiritual, cultural and educational needs considered and pt agreeable to plan of care and goals.    Goals     Goals:   The following goals were discussed with the patient and patient is in agreement with them as to be addressed in the treatment plan.   Long Term Goals (LTGs); to be met by discharge.  LTG #1: Pt will report a pain level of 1 out of 10 with ADLs and playing his instruments --progressing      LTG #2: Pt will demo improved FOTO score by at least 20 points. --progressing   LTG #3: Pt will return to prior level of function for ADLs and household management. --progressing   LTG #4: Pt will demonstrate improved B  strength by at least 5# for grasping and holding onto objects--progressing      Short Term Goals (STGs); to be met within 4 weeks (12/4/2020).  STG #1a: Pt will report 3 out of 10 pain level with playing his instruments.--progressing   STG #2: Pt will demonstrate independence with issued HEP. --progressing       Plan     Continue skilled occupational therapy with individualized plan of care focusing on improving pain and establishing home program to improve functional use of B hands.       Updates/Grading for next session: cont as tolerated and cont establishing home program.    Nena Florez, OT

## 2020-11-10 ENCOUNTER — TELEPHONE (OUTPATIENT)
Dept: PAIN MEDICINE | Facility: OTHER | Age: 70
End: 2020-11-10

## 2020-11-10 ENCOUNTER — PATIENT MESSAGE (OUTPATIENT)
Dept: PAIN MEDICINE | Facility: OTHER | Age: 70
End: 2020-11-10

## 2020-11-10 DIAGNOSIS — G89.4 CHRONIC PAIN SYNDROME: ICD-10-CM

## 2020-11-10 DIAGNOSIS — F32.A DEPRESSION, UNSPECIFIED DEPRESSION TYPE: Primary | ICD-10-CM

## 2020-11-10 RX ORDER — HYDROCODONE BITARTRATE AND ACETAMINOPHEN 7.5; 325 MG/1; MG/1
1 TABLET ORAL EVERY 8 HOURS PRN
Qty: 21 TABLET | Refills: 0 | Status: CANCELLED | OUTPATIENT
Start: 2020-11-10

## 2020-11-11 ENCOUNTER — TELEPHONE (OUTPATIENT)
Dept: GENETICS | Facility: CLINIC | Age: 70
End: 2020-11-11

## 2020-11-11 ENCOUNTER — TELEPHONE (OUTPATIENT)
Dept: NEUROLOGY | Facility: CLINIC | Age: 70
End: 2020-11-11

## 2020-11-11 NOTE — TELEPHONE ENCOUNTER
----- Message from Nicky Gill MS sent at 11/11/2020  2:11 PM CST -----  Regarding: genetic chitraing  Good afternoon,   We are seeing this pt in clinic tomorrow and was wondering if we could get a copy of his [facility] genetic testing report. I see his records from [facility] in the media tab, but not the actual [facility] test report.   My email is [email or web address] if that is an easier way to sent the report.   Thanks  Nicky   Genetic Counselor

## 2020-11-11 NOTE — TELEPHONE ENCOUNTER
----- Message from Emilia Grubbs MD sent at 11/11/2020  3:56 PM CST -----  Hey,    Please ask pt to bring results of genetic testing with him to the visit if he has them. We are working on getting them from Dr. Millan, but just in case.    M

## 2020-11-12 ENCOUNTER — OFFICE VISIT (OUTPATIENT)
Dept: GENETICS | Facility: CLINIC | Age: 70
End: 2020-11-12
Payer: COMMERCIAL

## 2020-11-12 VITALS — HEIGHT: 68 IN | WEIGHT: 178.88 LBS | BODY MASS INDEX: 27.11 KG/M2

## 2020-11-12 DIAGNOSIS — G60.0: ICD-10-CM

## 2020-11-12 PROCEDURE — 99999 PR PBB SHADOW E&M-EST. PATIENT-LVL IV: ICD-10-PCS | Mod: PBBFAC,,, | Performed by: MEDICAL GENETICS

## 2020-11-12 PROCEDURE — 3008F BODY MASS INDEX DOCD: CPT | Mod: CPTII,S$GLB,, | Performed by: MEDICAL GENETICS

## 2020-11-12 PROCEDURE — 1159F PR MEDICATION LIST DOCUMENTED IN MEDICAL RECORD: ICD-10-PCS | Mod: S$GLB,,, | Performed by: MEDICAL GENETICS

## 2020-11-12 PROCEDURE — 1126F PR PAIN SEVERITY QUANTIFIED, NO PAIN PRESENT: ICD-10-PCS | Mod: S$GLB,,, | Performed by: MEDICAL GENETICS

## 2020-11-12 PROCEDURE — 99203 PR OFFICE/OUTPT VISIT, NEW, LEVL III, 30-44 MIN: ICD-10-PCS | Mod: S$GLB,,, | Performed by: MEDICAL GENETICS

## 2020-11-12 PROCEDURE — 96040 PR GENETIC COUNSELING, EACH 30 MIN: CPT | Mod: S$GLB,,, | Performed by: MEDICAL GENETICS

## 2020-11-12 PROCEDURE — 1157F ADVNC CARE PLAN IN RCRD: CPT | Mod: S$GLB,,, | Performed by: MEDICAL GENETICS

## 2020-11-12 PROCEDURE — 99203 OFFICE O/P NEW LOW 30 MIN: CPT | Mod: S$GLB,,, | Performed by: MEDICAL GENETICS

## 2020-11-12 PROCEDURE — 1157F PR ADVANCE CARE PLAN OR EQUIV PRESENT IN MEDICAL RECORD: ICD-10-PCS | Mod: S$GLB,,, | Performed by: MEDICAL GENETICS

## 2020-11-12 PROCEDURE — 1126F AMNT PAIN NOTED NONE PRSNT: CPT | Mod: S$GLB,,, | Performed by: MEDICAL GENETICS

## 2020-11-12 PROCEDURE — 3008F PR BODY MASS INDEX (BMI) DOCUMENTED: ICD-10-PCS | Mod: CPTII,S$GLB,, | Performed by: MEDICAL GENETICS

## 2020-11-12 PROCEDURE — 1159F MED LIST DOCD IN RCRD: CPT | Mod: S$GLB,,, | Performed by: MEDICAL GENETICS

## 2020-11-12 PROCEDURE — 99999 PR PBB SHADOW E&M-EST. PATIENT-LVL IV: CPT | Mod: PBBFAC,,, | Performed by: MEDICAL GENETICS

## 2020-11-12 PROCEDURE — 96040 PR GENETIC COUNSELING, EACH 30 MIN: ICD-10-PCS | Mod: S$GLB,,, | Performed by: MEDICAL GENETICS

## 2020-11-12 RX ORDER — HYDROCODONE BITARTRATE AND ACETAMINOPHEN 7.5; 325 MG/1; MG/1
1 TABLET ORAL EVERY 8 HOURS PRN
Qty: 21 TABLET | Refills: 0 | Status: SHIPPED | OUTPATIENT
Start: 2020-11-12 | End: 2020-12-07

## 2020-11-12 NOTE — PROGRESS NOTES
[facility]  [id number] [place] [address]    DATE OF CONSULTATION: 12/07/2020    REFERRING PHYSICIAN: Kimberley Millan MD    REASON FOR CONSULTATION: We are requested by Dr. Kimberley Millan to consult on Raffy Rutherford Jr. regarding the diagnosis, management, and genetic counseling for the findings of polyneuropathy and several variants of unknown significance on genetic testing. Raffy is accompanied to clinic today by his wife.      HISTORY OF PRESENT ILLNESS:  Raffy Rutherford Jr. is a 70 y.o. male with polyneuropathy including bilateral severe carpal tunnel, foot drop found to have several variants of uncertain significance on genetic testing. He presents to [facility] today to review these results and to determine next steps in his genetic workup.    Mr. Rutherford started experiencing neuropathic ymptoms 8 years ago. He was having a lot of back pain and issues at the time was was thought to be the cause of his lower extremity symptoms. He developed foot drop about 6 years ago. Then developed numbness and tingling in all extremities. He was recommended to see Dr. Millan who brought up concern for a hereditary neuropathy. Mr. Rutherford has a 60y old brother who developed foot drop in his 30s and currently has neuropathy symptoms. She ordered genetic testing that revealed two variants of uncertain significance. One in HSPB1 (c.19C>T) and one in DNMT1 (c.977A>C).     MEDICAL HISTORY:      Patient Active Problem List    Diagnosis Date Noted    Pain in both hands 11/05/2020    Preoperative testing 08/20/2020    Spondylolisthesis at L5-S1 level 08/20/2020    DDD (degenerative disc disease), lumbosacral 08/19/2020    Sicca syndrome with keratoconjunctivitis 05/27/2020    Iron deficiency anemia 05/15/2020    History of colon polyps 12/17/2019    Weakness of both lower extremities 11/20/2019    Anxiety 11/14/2019    Anxiety about health 11/12/2019    MDD (major depressive  disorder), recurrent episode, moderate 11/12/2019    Episodic migraine without status migrainosus, not intractable 10/25/2019    Lumbar disc herniation with radiculopathy 10/25/2019    SI (sacroiliac) joint dysfunction 09/19/2019    Bilateral foot-drop 07/16/2019    Idiopathic peripheral neuropathy 07/16/2019    Sacroiliac joint dysfunction of right side 07/16/2019    Pain in left wrist 03/19/2019    Closed fracture of left distal radius 02/05/2019    Headache around the eyes 06/26/2018    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    Right wrist tendinitis 07/28/2017    Pain in right wrist 07/28/2017    S/P lumbar spinal fusion 12/02/2015    Cervical spondylosis 10/22/2015    Lumbar radiculopathy 09/15/2015    Encounter for postoperative wound check 08/01/2015    Degenerative disc disease 06/22/2015    Brow ptosis 02/10/2015    Paresthesia 08/01/2014    Thyroid nodule 07/16/2014    Carpal tunnel syndrome of right wrist 07/16/2014    Chronic LBP 06/27/2014    DJD (degenerative joint disease) of lumbar spine 06/27/2014    Chronic neck pain 06/27/2014    Right lumbar radiculopathy 06/27/2014    Cervicalgia 06/19/2014    Facet joint disease of cervical region 06/19/2014    Occipital neuralgia 06/19/2014    Chronic migraine without aura, with intractable migraine, so stated, with status migrainosus 06/19/2014    Cervical radiculopathy 06/19/2014    Paroxysmal hemicrania 06/19/2014    Sciatic nerve pain 06/19/2014    Special screening for malignant neoplasms, colon 05/05/2014    Lower back pain 04/01/2014    Lower extremity pain 04/01/2014    Muscle weakness 04/01/2014    Range of motion deficit 04/01/2014    Osteoarthritis 03/19/2014    Diverticulitis large intestine 12/21/2013    Fibromyalgia 10/21/2013    OP (osteoporosis) 10/21/2013    Compression fracture of T12 vertebra 10/21/2013    Prostatitis, acute 12/17/2012    Status post cataract extraction  and insertion of intraocular lens - Both Eyes 11/13/2012    Spondylosis without myelopathy 11/09/2012    Degeneration of lumbar or lumbosacral intervertebral disc 11/09/2012    Spinal stenosis, lumbar region, without neurogenic claudication 11/09/2012    Thoracic or lumbosacral neuritis or radiculitis, unspecified 11/09/2012    Displacement of lumbar intervertebral disc without myelopathy 11/09/2012    Acquired spondylolisthesis 11/09/2012    Lumbago 11/09/2012    Visual disturbances 10/03/2012    Depression     Hyperlipidemia     Chronic pain     Colon polyp     Adenomatous polyp     History of prostate biopsy     GERD (gastroesophageal reflux disease)     Back pain     Sleep apnea        Past Surgical History:   Procedure Laterality Date    BACK SURGERY      CATARACT EXTRACTION W/  INTRAOCULAR LENS IMPLANT Bilateral     CHOLECYSTECTOMY      COLONOSCOPY N/A 12/17/2019    Procedure: COLONOSCOPY;  Surgeon: Amadou Hardin MD;  [facility]  Service: Endoscopy;  Laterality: N/A;  pt request to do Miralax bowel prep-BB    COSMETIC SURGERY  2/10/2015    Direct mid-forehead brow lift    COSMETIC SURGERY  2/10/2015    Bilateral upper lid blepahroplasty    CYSTOSCOPY WITH URETEROSCOPY, RETROGRADE PYELOGRAPHY, AND INSERTION OF STENT Left 8/19/2020    Procedure: CYSTOSCOPY, WITH RETROGRADE PYELOGRAM AND URETERAL STENT INSERTION;  Surgeon: Katelynn George MD;  Location: [facility] OR;  Service: Urology;  Laterality: Left;    ESOPHAGOGASTRODUODENOSCOPY N/A 12/17/2019    Procedure: ESOPHAGOGASTRODUODENOSCOPY (EGD);  Surgeon: Amadou Hardin MD;  [facility]  Service: Endoscopy;  Laterality: N/A;    EYE SURGERY      FUSION OF LUMBAR SPINE BY ANTERIOR APPROACH N/A 8/20/2020    Procedure: FUSION, SPINE, LUMBAR, ANTERIOR APPROACH L5-S1 ALIF Stand Alone;  Surgeon: Jason Caldwell MD;  Location: [facility] OR;  Service: Neurosurgery;  Laterality: N/A;    HEMORRHOID SURGERY      with  complication of chronic bleeding for 6 weeks     INJECTION OF STEROID Right 12/6/2018    Procedure: INJECTION, STEROID Right SI Joint Block and Steroid Injection;  Surgeon: Jason Caldwell MD;  Location: [facility] OR;  Service: Neurosurgery;  Laterality: Right;  Procedure: Right SI Joint Block and Steroid Injection  Surgery Time: 30 MIN  LOS: 0  Anesthesia: MAC  Radiology: C-arm  Bed: [place] 4 Poster  Position: Prone    INJECTION OF STEROID Right 9/19/2019    Procedure: INJECTION, STEROID Procedure: Right SI joint block nd steroid injection;  Surgeon: Jason Caldwell MD;  Location: [facility] OR;  Service: Neurosurgery;  Laterality: Right;  Procedure: Right SI joint block nd steroid injection  Surgery Time: 30 mins  LOS:   Anesthesia: General MAC  Radiology:C-arm  Bed: Regular Bed  Position: Prone    JOINT REPLACEMENT      KNEE SURGERY      involving arthroscopic surgery to both knees    SINUS SURGERY      SINUS SURGERY      left molar and sinus surgery for trigeminal neuralgia    SPINAL FUSION  6/22/2015    L3-L5 XLIF    SPINE SURGERY  6/22/15    XLIF/TANA    TOTAL HIP ARTHROPLASTY  4/2012    Pt states he had total hip replacement on his left hip.       Medications:    Current Outpatient Medications on File Prior to Visit   Medication Sig Dispense Refill    butalbital-acetaminophen-caffeine -40 mg (FIORICET, ESGIC) -40 mg per tablet Take 1 tablet by mouth 6 hours as needed for Headaches. 30 tablet 0    clonazePAM (KLONOPIN) 0.5 MG tablet Take 2 tablets by mouth at bedtime and 1 tablet as needed for tinnitus 75 tablet 5    cyanocobalamin 1,000 mcg/mL injection       diclofenac (VOLTAREN) 50 MG EC tablet Take 1 tablet (50 mg total) by mouth 2 (two) times daily as needed (for headache, take with food if possible). 8 tablet 3    diclofenac sodium (VOLTAREN) 1 % Gel Apply 2 g topically once daily. 100 g 0    galcanezumab-gnlm (EMGALITY PEN) 120 mg/mL PnIj Inject 120 mg into the skin every 28 days.  4 mL 3    lamoTRIgine (LAMICTAL) 200 MG tablet Take 1 tablet (200 mg total) by mouth once daily. 90 tablet 3    methocarbamoL (ROBAXIN) 750 MG Tab Take 1 tablet (750 mg total) by mouth 3 (three) times daily as needed. (Patient taking differently: Take 500 mg by mouth 2 (two) times daily. 1/2 tablet twice a day) 90 tablet 11    pravastatin (PRAVACHOL) 40 MG tablet Take 1 tablet (40 mg total) by mouth once daily. 90 tablet 3    RABEprazole (ACIPHEX) 20 mg tablet Take 1 tablet (20 mg total) by mouth 2 (two) times daily. 180 tablet 3    selegiline (EMSAM) 12 mg/24 hr Place 1 new patch onto the skin once daily. 90 patch 3    tadalafil (CIALIS) 5 MG tablet Take 1 tablet (5 mg total) by mouth once daily. 30 tablet 12    traZODone (DESYREL) 100 MG tablet Take 1 tablet (100 mg total) by mouth every evening. 90 tablet 3    ubrogepant (UBRELVY)tablet 100 mg Take 1 tablet (100 mg total) by mouth once as needed for Migraine. 30 tablet 2    [DISCONTINUED] pregabalin (LYRICA) 50 MG capsule Take 1 capsule (50 mg total) by mouth 3 (three) times daily. 270 capsule 3    [DISCONTINUED] sucralfate (CARAFATE) 1 gram tablet TAKE 1 TABLET BY MOUTH 4 TIMES A  tablet 4    senna-docusate 8.6-50 mg (PERICOLACE) 8.6-50 mg per tablet Take 2 tablets by mouth nightly as needed for Constipation.      [DISCONTINUED] diclofenac potassium (CAMBIA) 50 mg PwPk Dissolve 1 (50 mg) packet in liquidand drink by mouth daily as needed. 9 each 3     No current facility-administered medications on file prior to visit.          Allergies:  Review of patient's allergies indicates:   Allergen Reactions    Alphagan [brimonidine]      Patient taking MASO-B Selective Inhibitor Selegiline (Emsam)    Coumadin [warfarin]      itch    Oxycodone      hiccups       Immunization History:  Immunization History   Administered Date(s) Administered    Influenza 10/09/2007, 10/15/2008, 12/03/2009, 11/08/2010, 10/03/2012, 11/03/2014    Influenza (FLUAD) -  Quadrivalent - Adjuvanted - PF *Preferred* (65+) 2020    Influenza - High Dose - PF (65 years and older) 10/19/2017, 10/15/2018, 10/17/2019    Influenza - Quadrivalent - PF *Preferred* (6 months and older) 2015, 2016    Influenza Split 10/03/2012    Pneumococcal Conjugate - 13 Valent 10/15/2018    Pneumococcal Polysaccharide - 23 Valent 2020    Tdap 2016    Zoster 10/03/2012       Pertinent Laboratory Studies: Hereditary neuropathy panel to [facility]:  HSPB1 (c.19C>T), DNMT1 (c.977A>C).     I have reviewed the patient's labs.    Pertinent Radiology & Imaging Studies:   MRI brain [facility] 2020:  Impression:     No evidence of acute intracranial pathology.     Unremarkable trigeminal nerves without evidence of external compression.     Chronic microvascular ischemic change.     MRA brain 2020: Unremarkable MRA of the [place]      DEVELOPMENT: No concerns. Pt is an  and musician.    REVIEW OF SYSTEMS: A complete review of systems was negative other than as stated above.    FAMILY HISTORY:   Please see scanned pedigree in patient's chart under media.  -Mr. Rutherford has two children, 33y and 30y. Neither have neuropathy symptoms   -One brother, 60y, with neuropathy and foot drop.   -One sister, 64y, with orthopedic issues and crohn's  -One sister, 66y, who has no concerns.    -MOP  at 86y. She has a history of colon caner at 65y and uterine cancer in her late 70s. She had four siblings.  -FOP  at 46y from complications of surgery  Maternal ancestry is [place]. Paternal ancestry is English. Consanguinity was denied.     SOCIAL HISTORY:   Social History     Socioeconomic History    Marital status:      Spouse name: Not on file    Number of children: Not on file    Years of education: Not on file    Highest education level: Not on file   Occupational History    Occupation: Psychotherpist    Social Needs    Financial resource strain: Not  "hard at all    Food insecurity     Worry: Never true     Inability: Never true    Transportation needs     Medical: No     Non-medical: No   Tobacco Use    Smoking status: Never Smoker    Smokeless tobacco: Never Used   Substance and Sexual Activity    Alcohol use: Yes     Frequency: 4 or more times a week     Drinks per session: 1 or 2     Binge frequency: Never     Comment: occasion    Drug use: No    Sexual activity: Yes     Partners: Female   Lifestyle    Physical activity     Days per week: 0 days     Minutes per session: 0 min    Stress: To some extent   Relationships    Social connections     Talks on phone: More than three times a week     Gets together: Twice a week     Attends [employer] service: Patient refused     Active member of club or organization: Patient refused     Attends meetings of clubs or organizations: Patient refused     Relationship status:    Other Topics Concern    Not on file   Social History Narrative    Not on file        MEASUREMENTS:  Wt Readings from Last 3 Encounters:   12/07/20 82.6 kg (182 lb)   12/07/20 82.7 kg (182 lb 5.1 oz)   11/12/20 81.1 kg (178 lb 14.5 oz)     Ht Readings from Last 3 Encounters:   12/07/20 5' 7" (1.702 m)   12/07/20 5' 7" (1.702 m)   11/12/20 5' 7.84" (1.723 m)       HC Readings from Last 3 Encounters:   No data found for HC       EXAM:  General: Size: Normal  Head: Size, shape, symmetry: Normal  Face: Symmetric, nondysmorphic  Eyes: Size, position, spacing, shape and orientation of palpebral fissures: Normal  Ears: size, configuration, position, rotation: normal  Nose: size, configuration, position, rotation: normal  Mouth/Jaw: size, shape, configuration, position: normal  Neck: Configuration: Normal  Cardiac: Rhythm, heart sounds:Normal, no murmurs appreciated.  Thorax: Nipples, pectus: Normal  Abdomen: No hepatosplenomegaly, non-distended, non-tender  Arms/Hands: Size, symmetry, proportion, digits, palmar creases: Normal  Legs/Feet: " Size, symmetry, proportion, digits: Normal  Back: Spine straight, intact  Skin: Texture: Normal, scars, lesions: Normal  Neurologic: DTRs, muscle bulk, tone: normal  Musculoskeletal: Range of motion: normal  Gait: Foot drop with gait anomaly      ASSESSMENT/DISCUSSION:  Raffy is a 70 y.o. male with a complicated past medical history including polyneuropathy and a family history of neuropathy found to have several variants of uncertain significance on neuropathy panel which also including TTR.    The HSPB1 gene is associated with Charcot-Selena-Tooth disease, type 2F. This disorder is characterized by symmetric progressive weakness starting between ages 15-25 years old.     DNMT1 is associated with hereditary sensory neuropathy type IE which is an autosomal dominant disorder with progressive sensory loss including of hearing and early onset dementia. Onset of symptoms is usually in adulthood.    The clinical significance of the variants in both DMNT1 and HSPB1 are unknown at thsi time. Given the classification of a VUS caution should be used before using this results for management decisions. The testing company offers two family members to be testing for the HSPB1 VUS, which could aid in reclassification. Mr. Rutherford will discuss familial variant testing with his siblings.    Risks and benefits of genetic testing reviewed. Family expresses understanding and their questions have been answered to their satisfaction.    Without a specific diagnosis, I am unable to provide recurrence risk information to the family at this time. Should the etiology of Raffy's features be genetic, the risk for recurrence in a future pregnancy could be significant.    Given history of depression with h/o multiple medication trials, patient inquires about pharmacogenomic testing. We discussed that there is no single-gene genetic testing available for depression and anxiety at this time. In the future, polygenic risk scores may be useful but  there is limited clinical utility at this time. Pharmacogenomic testing (PGx) may be helpful for individualized selection and dosage of medications used to treat depression, but we would first like to discuss this option with his psychiatrist as treatment selection and dosage will be managed under his care. Pt provided me with name of his psychiatrist Dr. Amadou Dennis and permission to contact him.     It was a pleasure to see Raffy today.  We would like to see Raffy back in Genetics clinic 1-2 year(s) or sooner as needed. Should any questions or concerns arise following today's visit, we encourage the family to contact the Genetics Office.    RECOMMENDATIONS/PLAN:   Will discuss pharmacogenomic testing will pt's psychiatrist per his request   Cancer genetic counseling services availably given father's history   Return to clinic in 1-2 year(s) or sooner as needed to review variants      The approximate physician face-to-face time was 30 minutes. The majority of the time (>50%) was spent on counseling of the patient or coordination of care.     Nicky Gill MS                 Genetic Counselor  [facility]    Emilia Grubbs MD  Board-Certified Medical Geneticist  [facility]      EXTERNAL CC:    MD Monty Hopson Pooja M., MD

## 2020-11-12 NOTE — PROGRESS NOTES
Office Visit - Genetic Counseling Evaluation   Raffy Rutherford Jr.  : 1950  MRN: [id number]    DATE OF SERVICE: 20  TIME SPENT: 1:40-2:19, 39min    REFERRING PROVIDER: Dr. Kimberley Millan    REASON FOR CONSULT:  Our Medical Genetic Service was asked to evaluate,Mr. Raffy Rutherford Jr., a 70 y.o. male with neuropathy, foot drop, carpal tunnel, and variants of uncertain significance on genetic testing. He came to the appointment with his wife.     HISTORY OF PRESENTING ILLNESS: Mr. Rutherford started experiencing neuropathy symptoms in his lower extremities about 8 years ago. He was having a lot of back pain and issues at the time was was thought to be the cause of his lower extremity symptoms. He developed foot drop about 6 years ago and continued to experience neuropathy. Currently he feels the numbness and tingling in all extremities. He was recommended to see Dr. Millan who brought up concern for a hereditary neuropathy. Mr. Rutherford has a 60y old brother who developed foot drop in his 30s and currently has neuropathy symptoms. She ordered genetic testing that revealed two variants of uncertain significance. One in HSPB1 (c.19C>T) and one in DNMT1 (c.977A>C).     MEDICAL HISTORY:    Patient Active Problem List   Diagnosis    Depression    Hyperlipidemia    Chronic pain    Colon polyp    Adenomatous polyp    History of prostate biopsy    GERD (gastroesophageal reflux disease)    Back pain    Sleep apnea    Visual disturbances    Spondylosis without myelopathy    Degeneration of lumbar or lumbosacral intervertebral disc    Spinal stenosis, lumbar region, without neurogenic claudication    Thoracic or lumbosacral neuritis or radiculitis, unspecified    Displacement of lumbar intervertebral disc without myelopathy    Acquired spondylolisthesis    Lumbago    Status post cataract extraction and insertion of intraocular lens - Both Eyes    Prostatitis, acute    Fibromyalgia    OP (osteoporosis)     Compression fracture of T12 vertebra    Diverticulitis large intestine    Osteoarthritis    Lower back pain    Lower extremity pain    Muscle weakness    Range of motion deficit    Special screening for malignant neoplasms, colon    Cervicalgia    Facet joint disease of cervical region    Occipital neuralgia    Chronic migraine without aura, with intractable migraine, so stated, with status migrainosus    Cervical radiculopathy    Paroxysmal hemicrania    Sciatic nerve pain    Chronic LBP    DJD (degenerative joint disease) of lumbar spine    Chronic neck pain    Right lumbar radiculopathy    Thyroid nodule    Carpal tunnel syndrome of right wrist    Paresthesia    Brow ptosis    Degenerative disc disease    Encounter for postoperative wound check    Lumbar radiculopathy    Cervical spondylosis    S/P lumbar spinal fusion    Right wrist tendinitis    Pain in right wrist    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    Headache around the eyes    Closed fracture of left distal radius    Pain in left wrist    Bilateral foot-drop    Idiopathic peripheral neuropathy    Sacroiliac joint dysfunction of right side    SI (sacroiliac) joint dysfunction    Episodic migraine without status migrainosus, not intractable    Lumbar disc herniation with radiculopathy    Anxiety about health    MDD (major depressive disorder), recurrent episode, moderate    Anxiety    Weakness of both lower extremities    History of colon polyps    Iron deficiency anemia    Sicca syndrome with keratoconjunctivitis    DDD (degenerative disc disease), lumbosacral    Preoperative testing    Spondylolisthesis at L5-S1 level    Pain in both hands       FAMILY HISTORY:  Please see scanned pedigree in patient's chart under media.  -Mr. Rutherford has two children, 33y and 30y. Neither have neuropathy symptoms   -One brother, 60y, with neuropathy and foot drop.   -One sister, 64y, with orthopedic  issues and crohn's  -One sister, 66y, who has no concerns.    -MOP  at 86y. She has a history of colon caner at 65y and uterine cancer in her late 70s. She had four siblings.  -FOP  at 44y from complications of surgery  Maternal ancestry is [place]. Paternal ancestry is English. Consanguinity was denied.     DISCUSSION & IMPRESSION:  Mr. Rutherford is a 70 y.o. male with neuropathy and foot drop.    -Reviewed Raffy's medical and family history. Discussed basics of genetics and the results of his genetic testing. He had a good understanding of what the genetic testing means as he had genetic counseling through [facility]. His [employer] comprehensive neuropathy panel identified a variant of uncertain significance (VUS) in the HSPB1 gene (c.19C>T) and a VUS in the DNMT1 gene (c.977A>C). A VUS is a change that we do not currently know the impact of. There are changes in genes that we know to cause a syndrome and changes that do not; a VUS is a change that we cannot classify into one of those categories at this time. The HSPB1 gene is associated with autosomal dominant Charcot-Selena-Tooth type 2F and distal hereditary motor neuropathy. The DNMT1 gene is associated with autosomal dominant cerebellar ataxia, deafness, and narcolepsy and hereditary sensory neuropathy type 1E. Given his personal history the HSPB1 VUS is suspicious to be contributing to his phenotype but given the classification of a VUS caution should be used before using this results for management decisions. The testing company offers two family members to be testing for the HSPB1 VUS, which could aid in reclassification. Discussed the family members that may be best are his symptomatic brother and asymptomatic sister. He stated he is unsure if his brother would be willing to get tested. Gave contact information he could pass on to his siblings to discuss testing. He was also interested in pharmacogenomic testing since he has depression and has trialed  many medications. Discussed that he should talk to his prescribing provider about this testing and see if he/she would be comfortable interpreting and using the results for medication managment. He was understanding of the information discussed in clinic and all questions were answered.     -Also discussed setting up an appointment for cancer genetic counseling given his mom's cancer history. Gave clinic number to call and schedule that appointment    Please see Dr. Grubbs note for medical genetics evaluation.    RECOMMENDATIONS:  1. Family follow-up testing for the HSPB1 VUS   2. See Dr. Grubbs's note for additional recommendations    Resource  Jean Carlos VELEZ, Pradeep T, Marie P, Annie Y, Sammy E, Lois M, Lizet S, Martin T, Waldo E, Steven O, Tony MCKEON, Norman F, Maryann T, Gabriel MCKEON, Divina E, Jimmie J, Stalin Bermudez R, Curt C, Ladarius P, Jovanny V, Ysabel T. Axonal Neuropathies due to Mutations in Small Heat Shock Proteins: Clinical, Genetic, and Functional Insights into Novel Mutations. Hum Mutat. 2017 May;38(5):556-568. doi: 10.1002/humu.48376. Epub 2017 Feb 25. PMID: 90411234.    Nicky Gill MS  Genetic Counselor  [facility]    Emilia Grubbs MD  Medical Geneticist   [facility]

## 2020-11-12 NOTE — LETTER
December 7, 2020      Kimberley Millan MD  [address]  [address]  [facility] [address]           [facility]  [address] [facility] [address]  Phone: [phone]          Patient: Raffy Rutherford Jr.   MR Number: [id number]   YOB: 1950   Date of Visit: 11/12/2020       Dear Dr. Kimberley Millan:    Thank you for referring Raffy Rutherford to me for evaluation. Attached you will find relevant portions of my assessment and plan of care.    If you have questions, please do not hesitate to call me. I look forward to following Raffy Rutherford along with you.    Sincerely,    Emilia Grubbs MD    Enclosure  CC:  No Recipients    If you would like to receive this communication electronically, please contact [email or web address] or [phone] to request more information on [employer] Link access.    For providers and/or their staff who would like to refer a patient to [facility], please contact us through our one-stop-shop provider referral line, [facility], at [phone].    If you feel you have received this communication in error or would no longer like to receive these types of communications, please e-mail [email or web address]

## 2020-11-16 ENCOUNTER — PATIENT MESSAGE (OUTPATIENT)
Dept: ORTHOPEDICS | Facility: CLINIC | Age: 70
End: 2020-11-16

## 2020-11-17 ENCOUNTER — CLINICAL SUPPORT (OUTPATIENT)
Dept: REHABILITATION | Facility: HOSPITAL | Age: 70
End: 2020-11-17
Attending: NEUROLOGICAL SURGERY
Payer: COMMERCIAL

## 2020-11-17 DIAGNOSIS — M79.641 PAIN IN BOTH HANDS: Primary | ICD-10-CM

## 2020-11-17 DIAGNOSIS — M79.642 PAIN IN BOTH HANDS: Primary | ICD-10-CM

## 2020-11-17 PROCEDURE — 97110 THERAPEUTIC EXERCISES: CPT

## 2020-11-17 NOTE — PROGRESS NOTES
"  Occupational Therapy Daily Treatment Note      Date: 11/17/2020  Name: Raffy Rutherford Jr.  Clinic Number: [id number]    Therapy Diagnosis:   No diagnosis found.  Physician: Antonio Kilpatrick MD       Physician Orders: eval and treat  Medical Diagnosis: Carpal tunnel syndrome, unspecified laterality [G56.00]  Surgical Procedure and Date: none / Date of Injury/Onset: ~6 months  Evaluation Date: 11/4/2020  Insurance Authorization Period Expiration: 12/31/2020  Plan of Care Certification Period: 12/18/2020  Date of Return to MD: 1/25/2020     Visit #  Visits authorized: 3/ 20     FOTO: initial eval        Time In: 10:00 am  Time Out: 10:45 am  Total treatment time: 30 minutes  Total Timed minutes 25 minutes     Precautions:  Standard      Subjective     Pt reports: "the braces help a little. I wonder if I should wear them during the day."  Pt reports he is considering having surgery.   he was compliant with home exercise program given last session.   Response to previous treatment: no change, good tolerance of HEP  Functional change: cont with pain    Pain: 3/10  Location: bilateral hands/wrists    Objective     Observation/Appearance: cording/Dupuytren's noted in B hands, worse in L.  No edema noted        Elbow and Wrist ROM. Measured in degrees.    11/5/2020 11/5/2020     Left Right   Elbow Ext/Flex       Supination/Pronation WNL WNL   Wrist Ext/Flex WNL WNL   Wrist RD/UD WNL WNL      Hand ROM.   Digits WFL, opposition WFL      Strength (Dynamometer) and Pinch Strength (Pinch Gauge)  Measured in pounds.    11/5/2020 11/5/2020     Left Right   Rung II 72 87   Key Pinch 13 18   3pt Pinch 12 13   2pt Pinch 10 11      Sensation    11/4/2020 11/4/2020     Left Right   Orrick Jaguar       Normal 1.65-2.83       Diminished Light Touch 3.22-3.61 x x   Diminished Protective 3.84-4.31       Loss of Protective 4.56-6.65       Untestable >6.65       2 Point Discrimination       Static       Dynamic          Manual " Muscle Test    11/5/2020 11/5/2020     Left Right   Wrist Extension  5/5 5/5   Wrist Flexion 5/5 5/5   Radial Deviation 5/5 5/5   Ulnar Deviation 5/5 5/5   Supination 5/5 5/5   Pronation 5/5 5/5   Elbow Extension 5/5 5/5   Elbow Flexion 5/5 5/5          Treatment consisted of the following:    Pt came into clinic stating that he thinks he might just move forward with the surgery . He is not able to play his Reonomy instrument .     He plans to reach back out the doctor to get a doctor appt set up soon .     Raffy received the following supervised modalities after being cleared for contradictions for 5 minutes:   Patient received MH x 5 min to B hands to increase blood flow, circulation and tissue elasticity prior to therex (paraffin not available)      Raffy received therapeutic exercises for 25 minutes including: pt required mod cuing for exercise  -median nerve glides, bilaterally, x 10 reps each  -ulnar nerve glides, L elbow, x 10 reps  -tendon glides x 10 reps bilaterally    Discussed and reviewed wrist brace wear. Pt brought in his 2 types of wrist braces. Reviewed fit of them as well. Pt verbalized understanding of education.      Home Exercises and Education Provided     Education provided: cont HEP. Reviewed wrist brace wear. Reviewed arm positioning and limiting pressure and bending of L elbow.  - Progress towards goals     Written Home Exercises Provided: Patient instructed to cont prior HEP.  Exercises were reviewed and Raffy was able to demonstrate them prior to the end of the session.  Raffy demonstrated good  understanding of the education provided.   .   See EMR under Patient Instructions for exercises provided initial eval.     Assessment     Pt reports he cont to have pain and numbness to B hands. Discussed home program again with pt. Pt cont to present with B hand pain, and numbness, especially in L RF/SF. Pt participated well and is motivated.     Raffy is progressing fairly towards his goals  and there are no updates to goals at this time. Pt prognosis continues as Fair. Pt will continue to benefit from skilled outpatient occupational therapy to address the deficits listed in the problem list on initial evaluation, provide pt/family education and to maximize pt's level of independence in the home and community environment.     Anticipated barriers to continued occupational therapy: chronic nature of dx    Pt's spiritual, cultural and educational needs considered and pt agreeable to plan of care and goals.    Goals     Goals:   The following goals were discussed with the patient and patient is in agreement with them as to be addressed in the treatment plan.   Long Term Goals (LTGs); to be met by discharge.  LTG #1: Pt will report a pain level of 1 out of 10 with ADLs and playing his instruments --progressing      LTG #2: Pt will demo improved FOTO score by at least 20 points. --progressing   LTG #3: Pt will return to prior level of function for ADLs and household management. --progressing   LTG #4: Pt will demonstrate improved B  strength by at least 5# for grasping and holding onto objects--progressing      Short Term Goals (STGs); to be met within 4 weeks (12/4/2020).  STG #1a: Pt will report 3 out of 10 pain level with playing his instruments.--progressing   STG #2: Pt will demonstrate independence with issued HEP. --progressing       Plan     Continue skilled occupational therapy with individualized plan of care focusing on improving pain and establishing home program to improve functional use of B hands.       Updates/Grading for next session: cont as tolerated and cont establishing home program.    Sheela Caldera, OT

## 2020-11-24 ENCOUNTER — PATIENT MESSAGE (OUTPATIENT)
Dept: PSYCHIATRY | Facility: OTHER | Age: 70
End: 2020-11-24

## 2020-11-25 ENCOUNTER — OFFICE VISIT (OUTPATIENT)
Dept: PSYCHIATRY | Facility: OTHER | Age: 70
End: 2020-11-25
Payer: COMMERCIAL

## 2020-11-25 DIAGNOSIS — G89.4 CHRONIC PAIN SYNDROME: ICD-10-CM

## 2020-11-25 DIAGNOSIS — F33.1 MODERATE EPISODE OF RECURRENT MAJOR DEPRESSIVE DISORDER: ICD-10-CM

## 2020-11-25 PROCEDURE — 90791 PSYCH DIAGNOSTIC EVALUATION: CPT | Mod: ,,, | Performed by: SOCIAL WORKER

## 2020-11-25 PROCEDURE — 1157F ADVNC CARE PLAN IN RCRD: CPT | Mod: ,,, | Performed by: SOCIAL WORKER

## 2020-11-25 PROCEDURE — 1157F PR ADVANCE CARE PLAN OR EQUIV PRESENT IN MEDICAL RECORD: ICD-10-PCS | Mod: ,,, | Performed by: SOCIAL WORKER

## 2020-11-25 PROCEDURE — 90791 PR PSYCHIATRIC DIAGNOSTIC EVALUATION: ICD-10-PCS | Mod: ,,, | Performed by: SOCIAL WORKER

## 2020-12-04 ENCOUNTER — PATIENT MESSAGE (OUTPATIENT)
Dept: PSYCHIATRY | Facility: OTHER | Age: 70
End: 2020-12-04

## 2020-12-04 NOTE — PROGRESS NOTES
[id number]  Psychosocial Assessment      Date: 11/25/202  Time: 12:00 PM    Name: Raffy Rutherford JrLuisa    Chief Complaint: Chronic Pain, r/o depression, pt interested in FRP, but unsure if he needs full program.     Referred by: Faith Leon NP    History of Present Illness: Pt states he has been experiencing chronic pain for quite some time, notes he had a hip replacement 8 years ago, notes his pain today is a 7. Notes he has to have carpel tunnel surgery in the next few months, worried about the recovery for this. Pt notes he has a hx of depression, feels it's being exacerbated now by his chronic pain.   Medical History:   Past Medical History:   Diagnosis Date    Adenomatous polyp 2003    Adenomatous polyp     Allergy     Arthritis     Back pain     after trauma beginning in 195    Cataract     Chronic pain     neck and left shoulder    Cluster headache 2013    Colon polyp     Degenerative disc disease     Depression     Diverticulitis 12/2013    Elevated PSA     Fibromyalgia 2013    GERD (gastroesophageal reflux disease)     Hepatitis 1970's    A    History of prostate biopsy 2002    Hyperlipidemia     Joint pain     Sleep apnea     Special screening for malignant neoplasms, colon 5/5/2014    Thyroid nodule 7/16/2014    Visual disturbance 2012    problems after cataract surgery       Past Surgical History:   Procedure Laterality Date    BACK SURGERY      CATARACT EXTRACTION W/  INTRAOCULAR LENS IMPLANT Bilateral     CHOLECYSTECTOMY      COLONOSCOPY N/A 12/17/2019    Procedure: COLONOSCOPY;  Surgeon: Amadou Hardin MD;  Location: [facility] ([address] [place] [address]);  Service: Endoscopy;  Laterality: N/A;  pt request to do Miralax bowel prep-BB    COSMETIC SURGERY  2/10/2015    Direct mid-forehead brow lift    COSMETIC SURGERY  2/10/2015    Bilateral upper lid blepahroplasty    CYSTOSCOPY WITH URETEROSCOPY, RETROGRADE PYELOGRAPHY, AND INSERTION OF STENT Left 8/19/2020     Procedure: CYSTOSCOPY, WITH RETROGRADE PYELOGRAM AND URETERAL STENT INSERTION;  Surgeon: Katelynn Geogre MD;  Location: [facility];  Service: Urology;  Laterality: Left;    ESOPHAGOGASTRODUODENOSCOPY N/A 12/17/2019    Procedure: ESOPHAGOGASTRODUODENOSCOPY (EGD);  Surgeon: Amadou Hardin MD;  Location: [facility] (4TH FLR);  Service: Endoscopy;  Laterality: N/A;    EYE SURGERY      FUSION OF LUMBAR SPINE BY ANTERIOR APPROACH N/A 8/20/2020    Procedure: FUSION, SPINE, LUMBAR, ANTERIOR APPROACH L5-S1 ALIF Stand Alone;  Surgeon: Jason Caldwell MD;  Location: [facility];  Service: Neurosurgery;  Laterality: N/A;    HEMORRHOID SURGERY      with complication of chronic bleeding for 6 weeks     INJECTION OF STEROID Right 12/6/2018    Procedure: INJECTION, STEROID Right SI Joint Block and Steroid Injection;  Surgeon: Jason Caldwell MD;  Location: [facility];  Service: Neurosurgery;  Laterality: Right;  Procedure: Right SI Joint Block and Steroid Injection  Surgery Time: 30 MIN  LOS: 0  Anesthesia: MAC  Radiology: C-arm  Bed: Dawson 4 Poster  Position: Prone    INJECTION OF STEROID Right 9/19/2019    Procedure: INJECTION, STEROID Procedure: Right SI joint block nd steroid injection;  Surgeon: Jason Caldwell MD;  Location: [facility];  Service: Neurosurgery;  Laterality: Right;  Procedure: Right SI joint block nd steroid injection  Surgery Time: 30 mins  LOS:   Anesthesia: General MAC  Radiology:C-arm  Bed: Regular Bed  Position: Prone    JOINT REPLACEMENT      KNEE SURGERY      involving arthroscopic surgery to both knees    SINUS SURGERY      SINUS SURGERY      left molar and sinus surgery for trigeminal neuralgia    SPINAL FUSION  6/22/2015    L3-L5 XLIF    SPINE SURGERY  6/22/15    XLIF/TANA    TOTAL HIP ARTHROPLASTY  4/2012    Pt states he had total hip replacement on his left hip.       Family History   Problem Relation Age of Onset    Cancer Mother         colon; uterine    Nephrolithiasis Mother     Stroke  Mother 86    Pancreatitis Brother     Vision loss Brother         macular degeneration    Diabetes Brother     Macular degeneration Brother     Migraines Sister     No Known Problems Father     No Known Problems Maternal Grandmother     No Known Problems Maternal Grandfather     No Known Problems Paternal Grandmother     No Known Problems Paternal Grandfather     No Known Problems Sister     No Known Problems Maternal Aunt     No Known Problems Maternal Uncle     No Known Problems Paternal Aunt     No Known Problems Paternal Uncle     Melanoma Neg Hx     Amblyopia Neg Hx     Blindness Neg Hx     Cataracts Neg Hx     Glaucoma Neg Hx     Hypertension Neg Hx     Retinal detachment Neg Hx     Strabismus Neg Hx     Thyroid disease Neg Hx     Allergic rhinitis Neg Hx     Allergies Neg Hx     Angioedema Neg Hx     Asthma Neg Hx     Eczema Neg Hx     Urticaria Neg Hx     Rhinitis Neg Hx     Immunodeficiency Neg Hx     Atopy Neg Hx        Social History     Socioeconomic History    Marital status:      Spouse name: Not on file    Number of children: Not on file    Years of education: Not on file    Highest education level: Not on file   Occupational History    Occupation: Psychotherpist    Social Needs    Financial resource strain: Not hard at all    Food insecurity     Worry: Never true     Inability: Never true    Transportation needs     Medical: No     Non-medical: No   Tobacco Use    Smoking status: Never Smoker    Smokeless tobacco: Never Used   Substance and Sexual Activity    Alcohol use: Yes     Frequency: 4 or more times a week     Drinks per session: 1 or 2     Binge frequency: Never     Comment: occasion    Drug use: No    Sexual activity: Yes     Partners: Female   Lifestyle    Physical activity     Days per week: 0 days     Minutes per session: 0 min    Stress: To some extent   Relationships    Social connections     Talks on phone: More than three  times a week     Gets together: Twice a week     Attends [employer] service: Patient refused     Active member of club or organization: Patient refused     Attends meetings of clubs or organizations: Patient refused     Relationship status:    Other Topics Concern    Not on file   Social History Narrative    Not on file       Current Outpatient Medications   Medication Sig Dispense Refill    butalbital-acetaminophen-caffeine -40 mg (FIORICET, ESGIC) -40 mg per tablet Take 1 tablet by mouth 6 hours as needed for Headaches. 30 tablet 0    clonazePAM (KLONOPIN) 0.5 MG tablet Take 2 tablets by mouth at bedtime and 1 tablet as needed for tinnitus 75 tablet 5    cyanocobalamin 1,000 mcg/mL injection       diclofenac (VOLTAREN) 50 MG EC tablet Take 1 tablet (50 mg total) by mouth 2 (two) times daily as needed (for headache, take with food if possible). 8 tablet 3    diclofenac potassium (CAMBIA) 50 mg PwPk Dissolve 1 (50 mg) packet in liquidand drink by mouth daily as needed. 9 each 3    diclofenac sodium (VOLTAREN) 1 % Gel Apply 2 g topically once daily. 100 g 0    galcanezumab-gnlm (EMGALITY PEN) 120 mg/mL PnIj Inject 120 mg into the skin every 28 days. 4 mL 3    HYDROcodone-acetaminophen (NORCO) 5-325 mg per tablet Take 1 tablet by mouth 2 (two) times a day. 60 tablet 0    HYDROcodone-acetaminophen (NORCO) 7.5-325 mg per tablet Take 1 tablet by mouth every 8 (eight) hours as needed for Pain. 21 tablet 0    lamoTRIgine (LAMICTAL) 200 MG tablet Take 1 tablet (200 mg total) by mouth once daily. 90 tablet 3    methocarbamoL (ROBAXIN) 750 MG Tab Take 1 tablet (750 mg total) by mouth 3 (three) times daily as needed. (Patient taking differently: Take 500 mg by mouth 2 (two) times daily. 1/2 tablet twice a day) 90 tablet 11    pravastatin (PRAVACHOL) 40 MG tablet Take 1 tablet (40 mg total) by mouth once daily. 90 tablet 3    pregabalin (LYRICA) 50 MG capsule Take 1 capsule (50 mg total) by  "mouth 3 (three) times daily. 270 capsule 3    pregabalin (LYRICA) 75 MG capsule Take 1 to 2 capsules by mouth two times daily 90 capsule 0    RABEprazole (ACIPHEX) 20 mg tablet Take 1 tablet (20 mg total) by mouth 2 (two) times daily. 180 tablet 3    selegiline (EMSAM) 12 mg/24 hr Place 1 new patch onto the skin once daily. 90 patch 3    senna-docusate 8.6-50 mg (PERICOLACE) 8.6-50 mg per tablet Take 2 tablets by mouth nightly as needed for Constipation.      sucralfate (CARAFATE) 1 gram tablet TAKE 1 TABLET BY MOUTH 4 TIMES A  tablet 4    tadalafil (CIALIS) 5 MG tablet Take 1 tablet (5 mg total) by mouth once daily. 30 tablet 12    traZODone (DESYREL) 100 MG tablet Take 1 tablet (100 mg total) by mouth every evening. 90 tablet 3    ubrogepant (UBRELVY)tablet 100 mg Take 1 tablet (100 mg total) by mouth once as needed for Migraine. 30 tablet 2     No current facility-administered medications for this visit.        Review of patient's allergies indicates:   Allergen Reactions    Alphagan [brimonidine]      Patient taking MASO-B Selective Inhibitor Selegiline (Emsam)    Coumadin [warfarin]      itch    Oxycodone      hiccups       Family History: (mental illness, substance abuse, relationships, etc.)    Paternal:  Maternal:  Siblings:  Children: Pt notes he has 2 children, both in good health. 32 yo and 29 yo, notes son was recently dxed with IBS.     Psychiatric History/Previous Substance Abuse TX (incluse response to past substance abuse tx):    Past Psychiatric History:   Therapy, Outpatient In past Pt notes his mental health "depends on the day", notes he has more downtime now which does not help his depression. Notes his depression is mostly managed well, notes he struggles with motivation at times.     Is pt currently in treatment with a therapist or psychiatrist? Yes, sees Dr. Amadou Woods for medication.        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     Legal Issues: Denies    Financial Status: Ok, notes wife is still working full time as an NP.     Social, Peer Group, Living Situation, and Living Environment: Pt notes he does have some support system but is struggling with isolation now 2/2 pandemic, most of his friends are musicians with whom he would usually play music.      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, plays the upright bass, notes this is difficult now with his hand issues, notes he also likes to compose music and play the guitar and keyboard    Nutrition: Pt states nutrition is good    Sleep: Pt notes sleep is good    Exercise: Pt states he is doing PT 3x/week, works out with a  on Sundays, likes to go walking with hiking poles    Stressors:Health Problems    Substance Use History: (include age of onset, duration, intensity, patterns of use, relapse history, and consequences of use for each substance): Denies    Any Other Addictive Behaviors: Denies    Motivation for change:  yes    Impressions: Pt is a 70 year old male with chronic pain who presents for an intake eval for FRP. Pt will be having bilateral carpel tunnel surgery in December, which may prolong his time before he is able to participate in FRP. Pt has well managed depression but is interested in therapy to help manage his depression and chronic pain.     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

## 2020-12-05 ENCOUNTER — PATIENT MESSAGE (OUTPATIENT)
Dept: NEUROLOGY | Facility: CLINIC | Age: 70
End: 2020-12-05

## 2020-12-06 ENCOUNTER — PATIENT MESSAGE (OUTPATIENT)
Dept: NEUROLOGY | Facility: CLINIC | Age: 70
End: 2020-12-06

## 2020-12-06 ENCOUNTER — PATIENT MESSAGE (OUTPATIENT)
Dept: NEUROSURGERY | Facility: CLINIC | Age: 70
End: 2020-12-06

## 2020-12-07 ENCOUNTER — OFFICE VISIT (OUTPATIENT)
Dept: NEUROLOGY | Facility: CLINIC | Age: 70
End: 2020-12-07
Payer: COMMERCIAL

## 2020-12-07 ENCOUNTER — OFFICE VISIT (OUTPATIENT)
Dept: ORTHOPEDICS | Facility: CLINIC | Age: 70
End: 2020-12-07
Payer: COMMERCIAL

## 2020-12-07 VITALS — BODY MASS INDEX: 28.56 KG/M2 | HEIGHT: 67 IN | WEIGHT: 182 LBS

## 2020-12-07 VITALS
WEIGHT: 182.31 LBS | HEIGHT: 67 IN | HEART RATE: 58 BPM | DIASTOLIC BLOOD PRESSURE: 70 MMHG | BODY MASS INDEX: 28.61 KG/M2 | SYSTOLIC BLOOD PRESSURE: 113 MMHG

## 2020-12-07 DIAGNOSIS — Z01.818 PREOP TESTING: Primary | ICD-10-CM

## 2020-12-07 DIAGNOSIS — G56.02 CARPAL TUNNEL SYNDROME OF LEFT WRIST: Primary | ICD-10-CM

## 2020-12-07 DIAGNOSIS — G60.0 CMT (CHARCOT-MARIE-TOOTH DISEASE): Primary | ICD-10-CM

## 2020-12-07 DIAGNOSIS — G56.22 CUBITAL TUNNEL SYNDROME ON LEFT: ICD-10-CM

## 2020-12-07 DIAGNOSIS — G56.00 CTS (CARPAL TUNNEL SYNDROME): ICD-10-CM

## 2020-12-07 PROCEDURE — 3288F FALL RISK ASSESSMENT DOCD: CPT | Mod: CPTII,S$GLB,, | Performed by: PSYCHIATRY & NEUROLOGY

## 2020-12-07 PROCEDURE — 99999 PR PBB SHADOW E&M-EST. PATIENT-LVL IV: CPT | Mod: PBBFAC,,, | Performed by: ORTHOPAEDIC SURGERY

## 2020-12-07 PROCEDURE — 1101F PR PT FALLS ASSESS DOC 0-1 FALLS W/OUT INJ PAST YR: ICD-10-PCS | Mod: CPTII,S$GLB,, | Performed by: PSYCHIATRY & NEUROLOGY

## 2020-12-07 PROCEDURE — 1159F MED LIST DOCD IN RCRD: CPT | Mod: S$GLB,,, | Performed by: ORTHOPAEDIC SURGERY

## 2020-12-07 PROCEDURE — 1125F PR PAIN SEVERITY QUANTIFIED, PAIN PRESENT: ICD-10-PCS | Mod: S$GLB,,, | Performed by: PSYCHIATRY & NEUROLOGY

## 2020-12-07 PROCEDURE — 3008F BODY MASS INDEX DOCD: CPT | Mod: CPTII,S$GLB,, | Performed by: ORTHOPAEDIC SURGERY

## 2020-12-07 PROCEDURE — 3288F FALL RISK ASSESSMENT DOCD: CPT | Mod: CPTII,S$GLB,, | Performed by: ORTHOPAEDIC SURGERY

## 2020-12-07 PROCEDURE — 1125F AMNT PAIN NOTED PAIN PRSNT: CPT | Mod: S$GLB,,, | Performed by: PSYCHIATRY & NEUROLOGY

## 2020-12-07 PROCEDURE — 99214 PR OFFICE/OUTPT VISIT, EST, LEVL IV, 30-39 MIN: ICD-10-PCS | Mod: S$GLB,,, | Performed by: ORTHOPAEDIC SURGERY

## 2020-12-07 PROCEDURE — 3008F PR BODY MASS INDEX (BMI) DOCUMENTED: ICD-10-PCS | Mod: CPTII,S$GLB,, | Performed by: PSYCHIATRY & NEUROLOGY

## 2020-12-07 PROCEDURE — 1101F PT FALLS ASSESS-DOCD LE1/YR: CPT | Mod: CPTII,S$GLB,, | Performed by: ORTHOPAEDIC SURGERY

## 2020-12-07 PROCEDURE — 99214 OFFICE O/P EST MOD 30 MIN: CPT | Mod: S$GLB,,, | Performed by: ORTHOPAEDIC SURGERY

## 2020-12-07 PROCEDURE — 1157F ADVNC CARE PLAN IN RCRD: CPT | Mod: S$GLB,,, | Performed by: PSYCHIATRY & NEUROLOGY

## 2020-12-07 PROCEDURE — 3008F PR BODY MASS INDEX (BMI) DOCUMENTED: ICD-10-PCS | Mod: CPTII,S$GLB,, | Performed by: ORTHOPAEDIC SURGERY

## 2020-12-07 PROCEDURE — 3008F BODY MASS INDEX DOCD: CPT | Mod: CPTII,S$GLB,, | Performed by: PSYCHIATRY & NEUROLOGY

## 2020-12-07 PROCEDURE — 1159F MED LIST DOCD IN RCRD: CPT | Mod: S$GLB,,, | Performed by: PSYCHIATRY & NEUROLOGY

## 2020-12-07 PROCEDURE — 1159F PR MEDICATION LIST DOCUMENTED IN MEDICAL RECORD: ICD-10-PCS | Mod: S$GLB,,, | Performed by: PSYCHIATRY & NEUROLOGY

## 2020-12-07 PROCEDURE — 99999 PR PBB SHADOW E&M-EST. PATIENT-LVL III: ICD-10-PCS | Mod: PBBFAC,,, | Performed by: PSYCHIATRY & NEUROLOGY

## 2020-12-07 PROCEDURE — 3288F PR FALLS RISK ASSESSMENT DOCUMENTED: ICD-10-PCS | Mod: CPTII,S$GLB,, | Performed by: ORTHOPAEDIC SURGERY

## 2020-12-07 PROCEDURE — 1101F PR PT FALLS ASSESS DOC 0-1 FALLS W/OUT INJ PAST YR: ICD-10-PCS | Mod: CPTII,S$GLB,, | Performed by: ORTHOPAEDIC SURGERY

## 2020-12-07 PROCEDURE — 99999 PR PBB SHADOW E&M-EST. PATIENT-LVL IV: ICD-10-PCS | Mod: PBBFAC,,, | Performed by: ORTHOPAEDIC SURGERY

## 2020-12-07 PROCEDURE — 1159F PR MEDICATION LIST DOCUMENTED IN MEDICAL RECORD: ICD-10-PCS | Mod: S$GLB,,, | Performed by: ORTHOPAEDIC SURGERY

## 2020-12-07 PROCEDURE — 99214 PR OFFICE/OUTPT VISIT, EST, LEVL IV, 30-39 MIN: ICD-10-PCS | Mod: S$GLB,,, | Performed by: PSYCHIATRY & NEUROLOGY

## 2020-12-07 PROCEDURE — 1157F PR ADVANCE CARE PLAN OR EQUIV PRESENT IN MEDICAL RECORD: ICD-10-PCS | Mod: S$GLB,,, | Performed by: ORTHOPAEDIC SURGERY

## 2020-12-07 PROCEDURE — 1157F PR ADVANCE CARE PLAN OR EQUIV PRESENT IN MEDICAL RECORD: ICD-10-PCS | Mod: S$GLB,,, | Performed by: PSYCHIATRY & NEUROLOGY

## 2020-12-07 PROCEDURE — 99214 OFFICE O/P EST MOD 30 MIN: CPT | Mod: S$GLB,,, | Performed by: PSYCHIATRY & NEUROLOGY

## 2020-12-07 PROCEDURE — 1101F PT FALLS ASSESS-DOCD LE1/YR: CPT | Mod: CPTII,S$GLB,, | Performed by: PSYCHIATRY & NEUROLOGY

## 2020-12-07 PROCEDURE — 3288F PR FALLS RISK ASSESSMENT DOCUMENTED: ICD-10-PCS | Mod: CPTII,S$GLB,, | Performed by: PSYCHIATRY & NEUROLOGY

## 2020-12-07 PROCEDURE — 99999 PR PBB SHADOW E&M-EST. PATIENT-LVL III: CPT | Mod: PBBFAC,,, | Performed by: PSYCHIATRY & NEUROLOGY

## 2020-12-07 PROCEDURE — 1157F ADVNC CARE PLAN IN RCRD: CPT | Mod: S$GLB,,, | Performed by: ORTHOPAEDIC SURGERY

## 2020-12-07 RX ORDER — MUPIROCIN 20 MG/G
OINTMENT TOPICAL
Status: CANCELLED | OUTPATIENT
Start: 2020-12-07

## 2020-12-07 NOTE — PROGRESS NOTES
"    NEUROLOGY  Outpatient Follow Up    [address] [address]  [phone] (office) / [phone] ( (fax)    Patient Name:  Raffy Rutherford Jr.  :  1950  MR #:  [id number]  Acct #:  [id number]    Date of Neurology Visit: 2020  Name of Neurologist: Kimberley Millan MD    Other Physicians:  Nini Rendon MD (Primary Care Physician); No ref. provider found (Referring)      Chief Complaint:  Neuropathy in the legs and severe headache      History of Present Illness (HPI):  Raffy Rutherford Jr. is a 70 y.o. male with history of chronic bilateral lower extremity pain, low back pain, motor neuropathy who presented to the clinic today for his initial visit with me. He additionally complains of severe disabling headaches    The patient was last seen on 2020 at which time he was seen for neuropathy and headaches.  He was recommended comprehensive neuropathy panel via [facility], EMG/NCS, physical therapy, Ubrelvy for migraines, he was advised to limit the use of Fioricet and Excedrin.  He was asked to restart Emgality to see if that would help him.  MRI brain, MRA brain and ophthalmology referral was recommended.     Today the patient reports that PT has helped him ( he goes to an outside PT). He has days with very little back pain and the neuropathy is unchanged. He describes "neuropathy" as calf weakness, pain from his shin goes into his toes, he can move his big toe but not his other toes. The pain is described as pins and needles, burning, hot , ice cold at times, 4-8 /10 in intensity. Driving or sitting too long makes it worse. TENS unit, heat and cold therapy help. He is getting laser treatment of his low back today with [facility].   Now the pain has moved up to his calves but this has been very intermittent.    He has tingling in all his fingers and he has been dropping things . He is seeing Dr. Bauer with hand surgery. 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     No falls but feels like his legs give out on him.       Headache:  Frequency: 10 days/ month   With all other features like previously reported     Ubrelvy helps with his migraine, sometimes 1 pill helps and sometimes taking a pill 2 hours later helps. Almost always he ends up needing the second dose.   Smells have been trigerring his pain.     He starts off with sneezing and then feels weak all over. This can last 1-4 days. This has been going on for years. This happens several times/ month. Feels like a mild flu.     HPI from initial history on 09/03/2020:    The patient recently underwent L3-L5 direct lateral interbody fusion and posterior instrumentation, L5-S1 degenerative disc disease, status post L5-S1 micro diskectomy.    History of neuropathy:  This may have started around 2012 and then this intensified since then. He had sciaticca  Initially which is pain in the back of his thigh which goes into his ankle, mainly on the right side but can happen on the left.     Constant neuropathy in his right lower extremity, intermittent in the left leg and intermittent neuropathy in his left > right hand.   He describes "neuropathy" as calf weakness, pain from his shin goes into his toes, he can move his big toe but not his other toes. The pain is described as pins and needles, burning, ice cold at times, 6-8/10 in intensity.   Now the pain has moved up to his calves but this has been very    Also has constant low back pain which is between 5-8/10 in intensity. He takes Hydrocodone, Robaxin, sometimes Advil, Lyrica, wine late at night helps. Changing positions helps, ice helps significantly. Since his surgery, low back pain and gait is better.    He also has enodrses tingling involving both his feet from his ankles into toes.     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    He had neck pain in the past which has improved.     He developed right foot drop 6 years ago and also a left foot drop subsequently which worsened initially and then stabilised.     Duration:  At least 8 years  Location:  Bilateral lower extremities  Intensity:  Moderate to severe  Aggravating factors:  Standing or sitting for prolonged periods of time  Relieving factors:  Changes in position or medication  Frequency:  Daily  Timing:  All day and constant in the right lower extremity and intermittent in the left lower extremity  Associated symptoms:  Bilateral foot drop      Per discharge summary dated 08/24/2020- the patient underwent L3-L5 direct lateral interbody fusion and posterior instrumentation, L5-S1 degenerative disc disease, status post L5-S1 micro diskectomy.    He was seen by Dr. Jason Brewer for evaluation of her 20 year history of pain over the left side of face, right leg pain and right foot burning.  Per his notes, EMG on 08/05/2014 revealed reduced motor compound motor action potential amplitudes and preserved sensory responses.  There was no evidence of conduction block per notes.  Concentric needle examination revealed chronic and some acute changes  Repeat EMG on 05/04/2015 by Dr. Cho revealed absent tibial motor amplitude, very low peroneal motor amplitude ( delayed onset at 7.7 milliseconds), intact right sural and superficial peroneal amplitudes with slightly delayed distal peak latency.  Chronic denervation changes were noted from the vastus medialis, extensor hallucis longus, tibialis anterior, tibialis posterior, gastrocnemius and short head of biceps femoris.        Headache:  He has had headaches since he was a teenager. He has had a tooth removal, occipital nerve block, Trigeminal nerve block and has tried Emgality x2 without benefit. He has lived with his headache since he was 21 years  old. He states they have been about the same in intesneity and frequency since he was 21. Last year he had disc extrusion for which his narcotics were increased and his headaches improved with it.    Type: iron hot stab like pain   Severity: 9/10  Location: left eye, forehead , cheek into left shoulder and left upper back   Triggers: Particular scents, looking at screen for a long time   Frequency: 7-8/ 10  Duration:  Days if he does not take medication (2-3 days) can have a cycles of headaches for several weeks , sometimes can go 4 weeks without headache   Aura: none   Photophobia: not as much but does endorse turning the lights off or worsening of headache when he looks at his phone  Phonophobia: ++  Nausea/ vomiting: rarely gets nauseous   Aggravating factors: none   Relieving factors: Medications  Inhales an oil- my dab which has given him significant benefit  Fioricet- 2 -3 pills/ day - would do this for 3 days at most  Ubrelvy usually has taken only 150 mg pill for the day and if he continued to have headaches has then taken Fioricet  Excedrin can help but makes tinnitus worse  Aspirin makes tinnitus worse   Previously failed therapies:  Depakote  Topamax  Imipramine  Elavil  Wellbutrin  Prozac  Zoloft  Paxil  Celexa  Lithium  Soma  Baclofen  Tylenol  Celebrex  Autonomic symptoms: left eye becomes slightky droopy  Activity during headache: would lay down for 20 minutes in the dark  Smoking: Never  Sleep: 7 hours   Caffeine: 2 cups of coffee/ day      He is on Hydrocodone 3 pills/ day x 5 years   He breaks his Robaxin in half usually     In addition the patient was seen by Dr. Meadows and Dr. Blackwood in the past for headache.  He had the occipital nerve block with minimal to no benefit    Brother has footdrop but also has diabetes  Sister may have similar complaints    Treatment to date:     For back pain he takes half a pill of hydrocodone 2 times a day  He also breaks is Robaxin and in half and takes it as  needed  He is on Lyrica for back pain but is unsure if it truly helps him    For migraine  He is not on any prophylactics  He reports trying Emgality x 2 in the past    For migraine abortive:  Mydad oil- inhaling this helps significantly  If not then he takes Fioricet 2-3 tablets per day and usually the headache resolves  He has tried Ubrelvy with some benefit but the headache usually does not resolve completely    Review of Systems:    General: Weight gain: yes, Weight Loss: No, Fatigue: Yes,   Fever: No, Chills: No, Night Sweats: No, Insomnia: No, Excessive sleeping: No   Respiratory:  Cough: No, Shortness of Breath: No,   Wheezing: No, Excessive Snoring:Yes, Coughing up blood: No  Endocrine: Heat Intolerance: No, Cold Intolerance: No,   Excessive Thirst: No, Excessive Hunger: No,   Eyes:  Blurred Vision: No, Double Vision: No,   Light Sensitivity: Yes, Eye pain: Yes  Musculoskeletal: Muscle Aches/Pain: Yes, Joint Pain/Swelling: Yes, Muscle Cramps: Yes, Muscle Weakness: Yes, Neck Pain: No, Back Pain: Yes   Neurological: Difficulty Walking/Falls: Yes, Headache Migraine: Yes, Dizziness/Vertigo: No, Fainting: No, Difficulty with Speech: No, Weakness: Yes, Tingling/Numbness: Yes, Tremors: Yes, Memory Problems: Yes, Seizures: No, Difficulty Swallowing: No, Altered Taste: No.  Cardiovascular: Chest Pain: No, Shortness of Breath: No,   Palpitations: No,  Gastrointestinal: Nausea/Vomiting: No, Constipation: No, Diarrhea: No, Bloody Stools: No   Psych/Cog:  Depression: Yes, Anxiety: No, Hallucinations: No, Problems Concentrating: No  : Frequent Urination: No, Incontinence: No, Blood of Urine: No, Urinary Infections: No, Changes in Sex Drive: No   ENT:Hearing Loss: Yes, Earache: No, Ringing in Ears: Yes,   Facial Pain: No, Chronic Congestion: No   Immune: Seasonal Allergies: Yes, Hives and/or Rashes: No  The remainder of the review of twelve body systems was reviewed and normal.    Past Medical, Surgical, Family & Social  History:   Past Medical History:   Diagnosis Date    Adenomatous polyp 2003    Adenomatous polyp     Allergy     Arthritis     Back pain     after trauma beginning in 195    Cataract     Chronic pain     neck and left shoulder    Cluster headache 2013    Colon polyp     Degenerative disc disease     Depression     Diverticulitis 12/2013    Elevated PSA     Fibromyalgia 2013    GERD (gastroesophageal reflux disease)     Hepatitis 1970's    A    History of prostate biopsy 2002    Hyperlipidemia     Joint pain     Sleep apnea     Special screening for malignant neoplasms, colon 5/5/2014    Thyroid nodule 7/16/2014    Visual disturbance 2012    problems after cataract surgery       Home Medications:     Current Outpatient Medications:     butalbital-acetaminophen-caffeine -40 mg (FIORICET, ESGIC) -40 mg per tablet, Take 1 tablet by mouth 6 hours as needed for Headaches., Disp: 30 tablet, Rfl: 0    clonazePAM (KLONOPIN) 0.5 MG tablet, Take 2 tablets by mouth at bedtime and 1 tablet as needed for tinnitus, Disp: 75 tablet, Rfl: 5    cyanocobalamin 1,000 mcg/mL injection, , Disp: , Rfl:     diclofenac (VOLTAREN) 50 MG EC tablet, Take 1 tablet (50 mg total) by mouth 2 (two) times daily as needed (for headache, take with food if possible)., Disp: 8 tablet, Rfl: 3    diclofenac potassium (CAMBIA) 50 mg PwPk, Dissolve 1 (50 mg) packet in liquidand drink by mouth daily as needed., Disp: 9 each, Rfl: 3    diclofenac sodium (VOLTAREN) 1 % Gel, Apply 2 g topically once daily., Disp: 100 g, Rfl: 0    galcanezumab-gnlm (EMGALITY PEN) 120 mg/mL PnIj, Inject 120 mg into the skin every 28 days., Disp: 4 mL, Rfl: 3    HYDROcodone-acetaminophen (NORCO) 5-325 mg per tablet, Take 1 tablet by mouth 2 (two) times a day., Disp: 60 tablet, Rfl: 0    HYDROcodone-acetaminophen (NORCO) 7.5-325 mg per tablet, Take 1 tablet by mouth every 8 (eight) hours as needed for Pain., Disp: 21 tablet, Rfl: 0     lamoTRIgine (LAMICTAL) 200 MG tablet, Take 1 tablet (200 mg total) by mouth once daily., Disp: 90 tablet, Rfl: 3    methocarbamoL (ROBAXIN) 750 MG Tab, Take 1 tablet (750 mg total) by mouth 3 (three) times daily as needed. (Patient taking differently: Take 500 mg by mouth 2 (two) times daily. 1/2 tablet twice a day), Disp: 90 tablet, Rfl: 11    pravastatin (PRAVACHOL) 40 MG tablet, Take 1 tablet (40 mg total) by mouth once daily., Disp: 90 tablet, Rfl: 3    pregabalin (LYRICA) 50 MG capsule, Take 1 capsule (50 mg total) by mouth 3 (three) times daily., Disp: 270 capsule, Rfl: 3    pregabalin (LYRICA) 75 MG capsule, Take 1 to 2 capsules by mouth two times daily, Disp: 90 capsule, Rfl: 0    RABEprazole (ACIPHEX) 20 mg tablet, Take 1 tablet (20 mg total) by mouth 2 (two) times daily., Disp: 180 tablet, Rfl: 3    selegiline (EMSAM) 12 mg/24 hr, Place 1 new patch onto the skin once daily., Disp: 90 patch, Rfl: 3    senna-docusate 8.6-50 mg (PERICOLACE) 8.6-50 mg per tablet, Take 2 tablets by mouth nightly as needed for Constipation., Disp: , Rfl:     sucralfate (CARAFATE) 1 gram tablet, TAKE 1 TABLET BY MOUTH 4 TIMES A DAY, Disp: 120 tablet, Rfl: 4    tadalafil (CIALIS) 5 MG tablet, Take 1 tablet (5 mg total) by mouth once daily., Disp: 30 tablet, Rfl: 12    traZODone (DESYREL) 100 MG tablet, Take 1 tablet (100 mg total) by mouth every evening., Disp: 90 tablet, Rfl: 3    ubrogepant (UBRELVY)tablet 100 mg, Take 1 tablet (100 mg total) by mouth once as needed for Migraine., Disp: 30 tablet, Rfl: 2    Physical Examination:  There were no vitals taken for this visit.    GENERAL:  General appearance: Well, non-toxic appearing.  No apparent distress.  Fundi exam: unable to visualize fundi   Extremities: normal.    MENTAL STATUS:  Alertness, attention span & concentration: normal.  Language: normal.  Orientation to self, place & time:  normal.  Memory, recent & remote: normal.  Fund of knowledge:  normal.      SPEECH:  Clear and fluent.  Follows complex commands.    CRANIAL NERVES:  Cranial Nerves II-XII were examined.  II - Visual fields: normal.  III, IV, VI: PERRL, EOMI, No ptosis, No nystagmus.  Patient reports blurred vision out of right eye  V - Facial sensation:  Intact to light touch bilaterally   VII - Face symmetry & mobility: normal.  VIII - Hearing: normal.  IX, X - Palate: mobile & midline.  XI - Shoulder shrug: normal.  XII - Tongue protrusion: normal.    GROSS MOTOR:  Gait & station:  Steppage gait, unable to walk on heels or toes.  Able to tandem with but with difficulty  Tone:  Increased tone involving lower extremities and left upper extremity  Abnormal movements:  Postural tremor noted involving bilateral hands- low-amplitude high-frequency tremor  Finger-nose & Heel-knee-shin: normal.      MUSCLE STRENGTH:     Fascics Atrophy RIGHT    LEFT Atrophy Fascics     5 Neck Ext. 5       5 Neck Flex 5       5 Deltoids 5       5 Sh.Ext.Rot. 5       5 Sh.Int.Rot. 5       5 Biceps 5       5 Triceps 5       5 Forearm.Pr. 5       5 Wrist Ext. 5       5 Wrist Flex 5       5 Finger Ext. 5       5 Finger Flex 5       5 FPL 5       5 Inteross. 5                         5 Iliopsoas 5       5 Hip Abduct 5       5 Hip Adduct 5       5 Quads 5       5 Hams 5       2 Dorsiflex 4       3 Plantar Flex 5       0 Ankle Jesus 3       4 Ankle Invert 5       0 Toe Ext. 3       3 Toe Flex 3+                         REFLEXES:    RIGHT Reflex   LEFT   2+ Biceps 2+   2+ Brachiorad. 2+   2+ Triceps 2+        2+ Patellar 2+   0 Ankle 0        Down PLANTAR Down     Extremely thin atrophied calves noted (inverted champagne bottle appearance to his legs) hammertoes also noted      SENSORY:  Light touch: Normal throughout.  Sharp touch: Normal throughout.  Vibration:  Loss up to the level of the right knee on the right, at the right knee noted to be 8 sec and absent to the level of the left medial malleolus on the left and noted  to be 6 sec  Temperature:  Gradient to mid leg bilaterally      Positive  Tinel sign bilaterally    Diagnostic Data Reviewed:      MRI brain with and without contrast, MRA brain 9/2020:  Impression:     No evidence of acute intracranial pathology.     Unremarkable trigeminal nerves without evidence of external compression.     Chronic microvascular ischemic change.        9/17/2020:  EMG/NCS :  Abnormal study. There is electrophysiologic evidence of:   1. Severe bilateral carpal tunnel syndrome   2. Chronic reinnervation changes noted from the left 1st dorsal interosseous is of unclear etiology. 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 A significant drop in ulnar motor conduction velocity was not noted on this study.           9/3/2020      MRI lumbar spine with and without contrast on 07/08/2020:  FINDINGS:  Redemonstration of posterior instrumented fusion with disc spacer spanning L3 through S5.  There is similar degree of anterior wedging at L1 with prior vertebral augmentation at T12.  There is disc desiccation at L5-S1 with Modic type 1 endplate changes/edema at L5-S1, new from prior.  Intervertebral disc gas also noted at this level.  Otherwise, no infiltrative marrow process.  Spinal cord terminus lies L1-L2.  Visualized prevertebral and paraspinal soft tissues demonstrate no acute abnormality.  Post-contrast images demonstrate no abnormal paraspinal collection or nerve root enhancement.     T12-L1: No significant posterior disc herniation, spinal canal stenosis, or neural foraminal impingement.     L1-L2: No significant spinal canal stenosis or neural foraminal narrowing.     L2-L3: Facet DJD with mild circumferential bulge and flavum thickening.  No significant central canal stenosis or neural foraminal impingement.     L3-L5: Postoperative change.  Allowing for metallic artifact, no significant central canal stenosis or neural foraminal  impingement.     L5-S1: Circumferential bulge with facet DJD.  No significant central canal stenosis.  At least moderate neural foraminal narrowing with possible encroachment of the right exiting L5 nerve root.     Impression:     Posterior instrumented fusion spanning L3 through L5.     Spondylosis change at L5-S1, progressed from 2018 with associated Modic type 1 endplate edema, likely inflammatory in nature.  At least moderate bilateral neural foraminal narrowing at this level with possible encroachment of the right exiting L5 nerve root.      MRI sacroiliac joints on 10/17/2019:    Sacroiliac joints demonstrate no effusion or synovitis.  No osteitis or ankylosis.  No evidence for enthesitis.  Minor degenerative changes are noted.     Bone marrow signal is maintained, without evidence for fracture or infiltrative process.  Partially visualized postoperative changes are seen in the lower lumbar spine.  There is a large disc extrusion extending cranially from L5-S1, measuring 1.9 cm CC by 1.1 cm AP by 1.8 cm TV resulting in narrowing of the right lateral recess with compression of the right L5 nerve root.     There is enlargement of the prostate and distension of the urinary bladder.     Left hip arthroplasty noted.     Impression:     1. No evidence for sacroiliitis.  2. Large disc extrusion at L5-S1 with cranial migration and compression of the right L5 nerve root.     MRI brain without contrast on 03/11/2016:       Findings: Age-appropriate generalized cerebral volume loss. There is few scattered small punctate foci T2/FLAIR signal hyperintensity in the supratentorial white matter   without corresponding diffusion signal abnormality. These are nonspecific and suggestive for mild degree of chronic microvascular ischemic change. There is no restricted diffusion to suggest acute infarction.  Study is limited by lack of contrast.  Compensatory enlargement of ventricles without hydrocephalus. No midline shift or mass  effect. No abnormal parenchymal gradient susceptibility. The major intracranial T2 flow-voids are present.  Remote operative change of bilateral cataract repair.  Partial fluid opacification right mastoid air cells  IMPRESSION:    Age-appropriate generalized volume loss with few scattered foci of T2/FLAIR signal abnormality throughout the supratentorial white matter  while nonspecific suggest for a mild degree of  chronic microvascular ischemic change.     Otherwise unremarkable noncontrast MRI brain without evidence for acute infarction.    MRI thoracic spine without contrast on 03/23/2015:    The marrow is normal in signal without evidence of a marrow replacement process, infection, or tumor. There are hemangiomas of the C7, T2 and T10 vertebral bodies.  There is a chronic mild compression fracture of T12 with prior vertebroplasty, stable   compared to prior study.     The spinal cord is normal in signal without evidence of cord edema or myelomalacia. Note is made of prominence of epidural fat throughout the course of the thoracic spine.      There is no evidence of disk herniation, central canal stenosis, or neuroforaminal stenosis involving the thoracic spine.     Incidentally visualized soft tissues structures of the chest and upper abdomen are within normal limits.  IMPRESSION:         Postoperative change from prior T12 vertebroplasty of mild compression fracture.     No significant central canal or neural foraminal narrowing.    MRI cervical spine 6/20 07/2014:    Findings:     There is mild straightening of the normal cervical lordosis, likely related to patient positioning versus muscle spasm.  Bone marrow signal demonstrates two small T1 hyperintense foci within the C4 and T2 vertebral bodies, likely hemangiomas.  Bone   marrow signal is otherwise unremarkable.  Vertebral body heights are well-maintained.  No acute fractures or dislocations.     Cerebellar tonsils are in their expected location.  The  cervicomedullary junction is normal.  The cervical spinal cord demonstrates normal signal and contour.     C2-C3: No central canal stenosis or neural foraminal narrowing.     C3-C4: No central canal stenosis or neural foraminal narrowing.     C4-C5: No central canal stenosis or neural foraminal narrowing.     C5-C6: Bilateral uncovertebral and facet arthrosis result in mild bilateral neural foraminal narrowing.     C6-C7: Bilateral uncovertebral and facet arthrosis result in mild right neural foraminal narrowing.  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     C7-T1: No central canal stenosis or neural foraminal narrowing.     Limited evaluation of the soft tissues demonstrates nonvisualization of the left submandibular gland.  Additionally, there is heterogeneous appearance of the left thyroid gland.  IMPRESSION:         1.  Mild degenerative changes of the lower cervical spine.  2.  Heterogeneity of the left thyroid gland.  Recommend thyroid ultrasound for further evaluation.  3.  Nonvisualization of left submandibular salivary gland.       EMG/NCS 5/4/2015:  Impression: COMPARISON TO STUDIES PERFORMED 1/25/13 AND 8/5/14.   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   2 ALLOWING FOR TECHNICAL DIFFERENCES, THIS IS SIMILAR TO PREVIOUS, ALTHOUGH THERE IS NO DEFINITE EVIDENCE OF ACTIVE DENERVATION AT THIS TIME.   3 THE PARASPINOUS WAS NOT SAMPLED BECAUSE OF PRIOR SURGERY. A POLYRADICULOPATHY CAN NOT BE RUED OUT.         Assessment and Plan:  Raffy Rutherford JrLuisa is a 70 y.o. 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 He additionally complains of severe disabling headaches.    The patient reports chronic numbness/tingling involving his  feet which has gradually progressed up to the level of his calves and more recently he has noted numbness over the dorsum of his hands and in his fingertips. The previously noted facial numbness has resolved today.     The patient recently underwent L3-L5 direct lateral interbody fusion and posterior instrumentation, L5-S1 degenerative disc disease, status post L5-S1 micro diskectomy.    On examination, the patient has evidence of right greater than left footdrop with evidence of plantar flexion weakness as well.  He has extremely thin and atrophied gastrocnemius muscles bilaterally and evidence of hammertoes.      His previous EMG/NCS review revealed absent tibial motor amplitude, very low peroneal motor amplitude ( delayed onset at 7.7 milliseconds), intact right sural and superficial peroneal amplitudes with slightly delayed distal peak latency.  Chronic denervation changes were noted from the vastus medialis, extensor hallucis longus, tibialis anterior, tibialis posterior, gastrocnemius and short head of biceps femoris. EMG/NCS of bilateral upper extremities performed by me in 9/2020 revealed abnormally prolonged bilateral median distal peak latencies compared with the Ulnar peak latencies on the Median-Ulnar palmar comparison studies, chronic reinnervation changes from bilateral APB and left FDI muscles. Left median distal peak latency is mildly prolonged and the right Median sensory peak latency is borderline normal.     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 Unclear clinical significance, if family members who are symptomatic have the same VUS could be of clinical significance- has undergone genetic counseling. His symptoms started at age 62 and have progressively worsened since. ( He has extremely thin calves and hammer toes which can be  seen in CMT). He also has a VUS in the  DNMT1 gene is associated with autosomal dominant cerebellar ataxia, deafness, and narcolepsy (ADCADN)andhereditary sensory neuropathy type 1E (HSN1E). He does not have this clinical phenotype.     His headaches meet ICHD-3 criteria for migraine without aura. He is on Emgality, he has Ubrelvy and minimal use of Fioricet for headache relief.     Assessment:  1.  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  Lumbosacral radiculopathy s/p surgery  3.  Chronic low back pain s/p multiple back surgeries  4.  Migraine without aura  5.  Bilateral CTS   6. Left FDI chronic neurogenic change ? Etiology       Recommendations:      For migraines:  Slightly worse today  Continue Emgality  Ubrelvy -1 pill at headache onset and repeat dose 2 hours later if still with headache no more than 2 doses in 24 hours   Voltaren po for days that you run out of Ubrelvy- no more than 9 pills/ month   Limit use of Fioricet  Limit use of Narcotics       Neuropathy  Possibly due to the Variant if Unknown significance in the HSPB1 gene , no pathogenic variant noted, family testing would be an option   Referral to [facility] clinic  PT/OT  Recheck B6 level today- high B6 levels can cause neuropathy     CTS:  Management per hand surgery    ? Left Ulnar neuropathy:  There was a 5 m/s drop in the Ulnar conduction velocity across the left elbow ( normal < 10 m/s) with mild chronic reinnervation change noted.   Could consider repeating EMG/NCS     Future: Referral to Ophthalmology given difficulty with fundi visualization       Follow up in 3 months       Time Spent: 40 minutes spent face-to-face, >50% spent advising about: counseling and/or coordination of care    This note was created with voice recognition software.  Grammatical, syntax and spelling errors may be inevitable.           Kimberley Millan,  MD  Medicine- Neurology, Clinical Neurophysiology

## 2020-12-07 NOTE — H&P
"[facility]   History & Physical   Orthopedics   SUBJECTIVE:    Chief Complaint: Left wrist pain   Referring Provider: No ref. provider found   History of Present Illness:   Patient is a 70 y.o. right hand dominant male who presents today with complaints of left wrist pain. He reports that at the end of January, 2018 he was vacationing in [facility] when he sustained a low energy fall onto his left wrist. He was found to have an intra-articular minimally displaced distal radius fracture upon follow-up in the [place] and was treated by closed means for his fracture. He was treated with initial casting and then a removable brace which was discontinued approximately 3 weeks ago per his report. He has been attending occupational therapy for range of motion and exercises. He notes that he has plateaued in his recovery and feels as if he is not making much progress and is still experiencing some pain which he rates 4 to 6/10. Of note he does have a history of Dupuytren's disease and he reports that his left small finger seems to be rotating in abnormally. 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 He reports that his pain is global but does identify an area at the volar aspect of the wrist and the ulnar aspect of the wrist which are painful. He presents for evaluation at the request of 1 of our physician's assistants.   Interval History: Patient presents today for follow up. He is recovering well after his wrist injury. He is back to playing guitar. He states that he has full ROM and strength in his wrist and fingers while playing, though he does notes a "strange sensation" in the ulnar aspect of his hand while playing that he says is non-painful. He is happy so far with the progress of his recovery. He has some slight TTP at the ulnar styloid and volar radial wrist, but this is improving. He states that he went out of town for a few " weeks so was lost to follow up with OT and is hoping to get back into therapy.   Interval History 10/19/20: Patient presents today for repeat presentation of his left Dupuytren's and newly diagnosed bilateral carpal tunnel syndrome. 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 At patient's last visit we had planned to observe his Dupuytren's and was not having carpal tunnel syndrome symptoms. Endorses he has had acute worsening of the numbness throughout all digits of his bilateral hands (L>R) with associated left hand weakness for the past few months. He is coping with his symptoms with mild improvement with bilateral nighttime carpal tunnel wrist braces and Voltaren gel provided by his neurologist but is still having significant weakness that is hindering him from his music. Also has had L5/S1 ALIF 7 weeks ago by Dr. Caldwell for severe degenerative disc disease and foraminal stenosis which he is progressing well.   Interval history December 7, 2020: The patient returns today for re-evaluation of his left upper extremity. He notes that he still has some issues and related to spasming in the small finger as well as numbness and tingling which has some constant see to the fingertips. He is being actively worked up and treated by Neurology for Charcot-Selena-Tooth disease any more widespread neurologic disorder but has been found to have significant and severe carpal tunnel syndrome with some ulnar nerve dysfunction as well. He presents today for re-evaluation with no other new complaints.   The patient is a/an musician, psychotherapist, .   Onset of symptoms/DOI was nearly 4 months ago.   Symptoms are aggravated by activity and movement.   Symptoms are alleviated by rest.   Symptoms consist of pain.   The patient  rates their pain as a 4/10.   Attempted treatment(s) and/or interventions include rest, activity modification, anti-inflammatory medications, physical and/or occupational therapy, immobilization and splinting/casting.   The patient denies any fevers, chills, N/V, D/C and presents for evaluation.        Past Medical History:   Diagnosis Date    Adenomatous polyp 2003    Adenomatous polyp     Allergy     Arthritis     Back pain     after trauma beginning in 195    Cataract     Chronic pain     neck and left shoulder    Cluster headache 2013    Colon polyp     Degenerative disc disease     Depression     Diverticulitis 12/2013    Elevated PSA     Fibromyalgia 2013    GERD (gastroesophageal reflux disease)     Hepatitis 1970's    A    History of prostate biopsy 2002    Hyperlipidemia     Joint pain     Sleep apnea     Special screening for malignant neoplasms, colon 5/5/2014    Thyroid nodule 7/16/2014    Visual disturbance 2012    problems after cataract surgery           Past Surgical History:   Procedure Laterality Date    BACK SURGERY      CATARACT EXTRACTION W/ INTRAOCULAR LENS IMPLANT Bilateral     CHOLECYSTECTOMY      COLONOSCOPY N/A 12/17/2019    Procedure: COLONOSCOPY; Surgeon: Amadou Hardin MD; Location: [address]); Service: Endoscopy; Laterality: N/A; pt request to do Miralax bowel prep-BB    COSMETIC SURGERY  2/10/2015    Direct mid-forehead brow lift    COSMETIC SURGERY  2/10/2015    Bilateral upper lid blepahroplasty    CYSTOSCOPY WITH URETEROSCOPY, RETROGRADE PYELOGRAPHY, AND INSERTION OF STENT Left 8/19/2020    Procedure: CYSTOSCOPY, WITH RETROGRADE PYELOGRAM AND URETERAL STENT INSERTION; Surgeon: Katelynn George MD; Location: [facility]; Service: Urology; Laterality: Left;    ESOPHAGOGASTRODUODENOSCOPY N/A 12/17/2019    Procedure: ESOPHAGOGASTRODUODENOSCOPY (EGD); Surgeon: Amadou Hardin MD; Location: [facility] ([address] [place] [address]); Service: Endoscopy; Laterality: N/A;    EYE  SURGERY      FUSION OF LUMBAR SPINE BY ANTERIOR APPROACH N/A 8/20/2020    Procedure: FUSION, SPINE, LUMBAR, ANTERIOR APPROACH L5-S1 ALIF Stand Alone; Surgeon: Jason Caldwell MD; Location: [facility] OR; Service: Neurosurgery; Laterality: N/A;    HEMORRHOID SURGERY      with complication of chronic bleeding for 6 weeks     INJECTION OF STEROID Right 12/6/2018    Procedure: INJECTION, STEROID Right SI Joint Block and Steroid Injection; Surgeon: Jason Caldwell MD; Location: [facility] OR; Service: Neurosurgery; Laterality: Right; Procedure: Right SI Joint Block and Steroid Injection   Surgery Time: 30 MIN   LOS: 0   Anesthesia: MAC   Radiology: C-arm   Bed: Holland 4 Poster   Position: Prone    INJECTION OF STEROID Right 9/19/2019    Procedure: INJECTION, STEROID Procedure: Right SI joint block nd steroid injection; Surgeon: Jason Caldwell MD; Location: [facility] OR; Service: Neurosurgery; Laterality: Right; Procedure: Right SI joint block nd steroid injection   Surgery Time: 30 mins   LOS:   Anesthesia: General MAC   Radiology:C-arm   Bed: Regular Bed   Position: Prone    JOINT REPLACEMENT      KNEE SURGERY      involving arthroscopic surgery to both knees    SINUS SURGERY      SINUS SURGERY      left molar and sinus surgery for trigeminal neuralgia    SPINAL FUSION  6/22/2015    L3-L5 XLIF    SPINE SURGERY  6/22/15    XLIF/TANA    TOTAL HIP ARTHROPLASTY  4/2012    Pt states he had total hip replacement on his left hip.           Review of patient's allergies indicates:   Allergen Reactions    Alphagan [brimonidine]      Patient taking MASO-B Selective Inhibitor Selegiline (Emsam)    Coumadin [warfarin]      itch    Oxycodone      hiccups     Social History         Social History Narrative    Not on file           Family History   Problem Relation Age of Onset    Cancer Mother     colon; uterine    Nephrolithiasis Mother     Stroke Mother 86    Pancreatitis Brother     Vision loss Brother     macular  degeneration    Diabetes Brother     Macular degeneration Brother     Migraines Sister     No Known Problems Father     No Known Problems Maternal Grandmother     No Known Problems Maternal Grandfather     No Known Problems Paternal Grandmother     No Known Problems Paternal Grandfather     No Known Problems Sister     No Known Problems Maternal Aunt     No Known Problems Maternal Uncle     No Known Problems Paternal Aunt     No Known Problems Paternal Uncle     Melanoma Neg Hx     Amblyopia Neg Hx     Blindness Neg Hx     Cataracts Neg Hx     Glaucoma Neg Hx     Hypertension Neg Hx     Retinal detachment Neg Hx     Strabismus Neg Hx     Thyroid disease Neg Hx     Allergic rhinitis Neg Hx     Allergies Neg Hx     Angioedema Neg Hx     Asthma Neg Hx     Eczema Neg Hx     Urticaria Neg Hx     Rhinitis Neg Hx     Immunodeficiency Neg Hx     Atopy Neg Hx    Current Outpatient Medications:    butalbital-acetaminophen-caffeine -40 mg (FIORICET, ESGIC) -40 mg per tablet, Take 1 tablet by mouth 6 hours as needed for Headaches., Disp: 30 tablet, Rfl: 0    clonazePAM (KLONOPIN) 0.5 MG tablet, Take 2 tablets by mouth at bedtime and 1 tablet as needed for tinnitus, Disp: 75 tablet, Rfl: 5    cyanocobalamin 1,000 mcg/mL injection, , Disp: , Rfl:    diclofenac (VOLTAREN) 50 MG EC tablet, Take 1 tablet (50 mg total) by mouth 2 (two) times daily as needed (for headache, take with food if possible)., Disp: 8 tablet, Rfl: 3    diclofenac sodium (VOLTAREN) 1 % Gel, Apply 2 g topically once daily., Disp: 100 g, Rfl: 0    galcanezumab-gnlm (EMGALITY PEN) 120 mg/mL PnIj, Inject 120 mg into the skin every 28 days., Disp: 4 mL, Rfl: 3    HYDROcodone-acetaminophen (NORCO) 5-325 mg per tablet, Take 1 tablet by mouth 2 (two) times a day., Disp: 60 tablet, Rfl: 0    lamoTRIgine (LAMICTAL) 200 MG tablet, Take 1 tablet (200 mg total) by mouth once daily., Disp: 90 tablet, Rfl: 3     methocarbamoL (ROBAXIN) 750 MG Tab, Take 1 tablet (750 mg total) by mouth 3 (three) times daily as needed. (Patient taking differently: Take 500 mg by mouth 2 (two) times daily. 1/2 tablet twice a day), Disp: 90 tablet, Rfl: 11    pravastatin (PRAVACHOL) 40 MG tablet, Take 1 tablet (40 mg total) by mouth once daily., Disp: 90 tablet, Rfl: 3    pregabalin (LYRICA) 75 MG capsule, Take 1 to 2 capsules by mouth two times daily, Disp: 90 capsule, Rfl: 0    RABEprazole (ACIPHEX) 20 mg tablet, Take 1 tablet (20 mg total) by mouth 2 (two) times daily., Disp: 180 tablet, Rfl: 3    selegiline (EMSAM) 12 mg/24 hr, Place 1 new patch onto the skin once daily., Disp: 90 patch, Rfl: 3    senna-docusate 8.6-50 mg (PERICOLACE) 8.6-50 mg per tablet, Take 2 tablets by mouth nightly as needed for Constipation., Disp: , Rfl:    tadalafil (CIALIS) 5 MG tablet, Take 1 tablet (5 mg total) by mouth once daily., Disp: 30 tablet, Rfl: 12    traZODone (DESYREL) 100 MG tablet, Take 1 tablet (100 mg total) by mouth every evening., Disp: 90 tablet, Rfl: 3    ubrogepant (UBRELVY)tablet 100 mg, Take 1 tablet (100 mg total) by mouth once as needed for Migraine., Disp: 30 tablet, Rfl: 2   Review of Systems:   Constitutional: no fever or chills   Eyes: no visual changes   ENT: no nasal congestion or sore throat   Respiratory: no cough or shortness of breath   Cardiovascular: no chest pain   Gastrointestinal: no nausea or vomiting, tolerating diet   Musculoskeletal: pain, soreness and decreased ROM   OBJECTIVE:    Vital Signs (Most Recent):   Vitals                           Body mass index is 28.51 kg/m².   Physical Exam:   Constitutional: The patient appears well-developed and well-nourished. No distress.   Head: Normocephalic and atraumatic.   Nose: Nose normal.   Eyes: Conjunctivae and EOM are normal.   Neck: No tracheal deviation present.   Cardiovascular: Normal rate and intact distal pulses.   Pulmonary/Chest: Effort normal. No  respiratory distress.   Abdominal: There is no guarding.   Neurological: The patient is alert.   Psychiatric: The patient has a normal mood and affect.   Left Hand/Wrist Examination:   Observation/Inspection:   Swelling none   Deformity none   Discoloration none   Scars none   Atrophy No thenar atrophy bilaterally   There are some Dupuytren's cords throughout the patient's left hand to the small long and ring fingers. There is no significant contracture of the MCP or PIP joints associated with this.   HAND/WRIST EXAMINATION:   Finkelstein's Test Neg   WHAT Test Neg   Snuff box tenderness Neg   Ochoa's Test Neg   Hook of Hamate Tenderness Neg   CMC grind Neg   Circumduction test Neg   Neurovascular Exam:   Digits WWP, brisk CR < 3s throughout   NVI motor/LTS to M/R/U nerves, radial pulse 2+   Tinel's Test - Carpal Tunnel Positive bilaterally   Tinel's Test - Cubital Tunnel Positive bilaterally   ROM hand/wrist/elbow full, painless. Able to make a full composite fist without extensor lag. 4/5 strength of FPL on the left, but 5/5 strength father flexors and extensors throughout bilateral hands. Able to interdigitate his thumb and small finger, and able to oppose the thumb to the base of the small finger bilaterally. Paresthesias in the median nerve distribution on the left, otherwise sensation intact to light touch throughout.   Diagnostic Results:   Xray - bilateral wrists completed today without any acute fractures or dislocations   MRI left wrist on 5/2019-Partially comminuted, nondisplaced distal radial fracture with intra-articular extension and minimal step-off.  Focal tear of the TFCC near the radial attachment.  Sprain/partial tear of the scapholunate ligament central component. Dorsal and volar bands appear intact.   Tendinosis of the ECU.   Respirations nonlabored abdomen not guarded perfusion intact   EMG - Abnormal study. There is electrophysiologic evidence of:   1. Severe bilateral carpal tunnel syndrome    2. Chronic reinnervation changes noted from the left 1st dorsal interosseous is of unclear etiology. 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 A significant drop in ulnar motor conduction velocity was not noted on this study.   ASSESSMENT/PLAN:    70 y.o. yo male with left carpal and cubital tunnel syndromes   Plan: Treatment options discussed. The patient has severe carpal tunnel syndrome on his EMG and has a strongly positive Tinel's at the elbow. He would like to proceed with a left carpal tunnel and cubital tunnel release which I feel is reasonable. We will proceed with these on December 16, 2020.   The patient has not responded to adequate non operative treatment at this time and/or non operative treatment is not indicated. Thus, the risks, benefits and alternatives to surgery were discussed with the patient in detail. Specific risks include but are not limited to bleeding, infection, vessel and/or nerve damage, pain, numbness, tingling, complex regional pain syndrome, compartment syndrome, failure to return to pre-injury and/or preoperative functional status, scar sensitivity, delayed healing, inability to return to work, pulley injury, tendon injury, bowstringing, partial and/or incomplete relief of symptoms, weakness, persistence of and/or worsening of symptoms, surgical failure, osteomyelitis, amputation, loss of function, stiffness, functional debility, dysfunction, decreased  strength, need for prolonged postoperative rehabilitation, need for further surgery, deep venous thrombosis, pulmonary embolism, arthritis and death. The patient states an understanding and wishes to proceed with surgery. All questions were answered. No guarantees were implied or stated. Written informed consent was obtained.     Addi Bauer M.D.   Please be aware that this note has been generated with the assistance of major voice-to-text. Please excuse any spelling  or grammatical errors.

## 2020-12-07 NOTE — PATIENT INSTRUCTIONS
Thank you for coming:    Headaches:  Slightly worse   Continue Emgality  Ubrelvy -1 pill at headache onset and repeat dose 2 hours later if still with headache no more than 2 doses in 24 hours   Voltaren for days that you run out of Ubrelvy- no  More than 9 pills/ month   Limit use of Fioricet      Neuropathy  Likely  Due to to the Variant if Unknown significance in the HSPB1 gene   Referral to [facility] clinic  PT/OT  Recheck B6 level today    Follow up in 3 months

## 2020-12-07 NOTE — H&P (VIEW-ONLY)
"[facility]   History & Physical   Orthopedics   SUBJECTIVE:    Chief Complaint: Left wrist pain   Referring Provider: No ref. provider found   History of Present Illness:   Patient is a 70 y.o. right hand dominant male who presents today with complaints of left wrist pain. He reports that at the end of January, 2018 he was vacationing in [facility] when he sustained a low energy fall onto his left wrist. He was found to have an intra-articular minimally displaced distal radius fracture upon follow-up in the [place] and was treated by closed means for his fracture. He was treated with initial casting and then a removable brace which was discontinued approximately 3 weeks ago per his report. He has been attending occupational therapy for range of motion and exercises. He notes that he has plateaued in his recovery and feels as if he is not making much progress and is still experiencing some pain which he rates 4 to 6/10. Of note he does have a history of Dupuytren's disease and he reports that his left small finger seems to be rotating in abnormally. 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 He reports that his pain is global but does identify an area at the volar aspect of the wrist and the ulnar aspect of the wrist which are painful. He presents for evaluation at the request of 1 of our physician's assistants.   Interval History: Patient presents today for follow up. He is recovering well after his wrist injury. He is back to playing guitar. He states that he has full ROM and strength in his wrist and fingers while playing, though he does notes a "strange sensation" in the ulnar aspect of his hand while playing that he says is non-painful. He is happy so far with the progress of his recovery. He has some slight TTP at the ulnar styloid and volar radial wrist, but this is improving. He states that he went out of town for a few " weeks so was lost to follow up with OT and is hoping to get back into therapy.   Interval History 10/19/20: Patient presents today for repeat presentation of his left Dupuytren's and newly diagnosed bilateral carpal tunnel syndrome. 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 At patient's last visit we had planned to observe his Dupuytren's and was not having carpal tunnel syndrome symptoms. Endorses he has had acute worsening of the numbness throughout all digits of his bilateral hands (L>R) with associated left hand weakness for the past few months. He is coping with his symptoms with mild improvement with bilateral nighttime carpal tunnel wrist braces and Voltaren gel provided by his neurologist but is still having significant weakness that is hindering him from his music. Also has had L5/S1 ALIF 7 weeks ago by Dr. Caldwell for severe degenerative disc disease and foraminal stenosis which he is progressing well.   Interval history December 7, 2020: The patient returns today for re-evaluation of his left upper extremity. He notes that he still has some issues and related to spasming in the small finger as well as numbness and tingling which has some constant see to the fingertips. He is being actively worked up and treated by Neurology for Charcot-Selena-Tooth disease any more widespread neurologic disorder but has been found to have significant and severe carpal tunnel syndrome with some ulnar nerve dysfunction as well. He presents today for re-evaluation with no other new complaints.   The patient is a/an musician, psychotherapist, .   Onset of symptoms/DOI was nearly 4 months ago.   Symptoms are aggravated by activity and movement.   Symptoms are alleviated by rest.   Symptoms consist of pain.   The patient  rates their pain as a 4/10.   Attempted treatment(s) and/or interventions include rest, activity modification, anti-inflammatory medications, physical and/or occupational therapy, immobilization and splinting/casting.   The patient denies any fevers, chills, N/V, D/C and presents for evaluation.        Past Medical History:   Diagnosis Date    Adenomatous polyp 2003    Adenomatous polyp     Allergy     Arthritis     Back pain     after trauma beginning in 195    Cataract     Chronic pain     neck and left shoulder    Cluster headache 2013    Colon polyp     Degenerative disc disease     Depression     Diverticulitis 12/2013    Elevated PSA     Fibromyalgia 2013    GERD (gastroesophageal reflux disease)     Hepatitis 1970's    A    History of prostate biopsy 2002    Hyperlipidemia     Joint pain     Sleep apnea     Special screening for malignant neoplasms, colon 5/5/2014    Thyroid nodule 7/16/2014    Visual disturbance 2012    problems after cataract surgery           Past Surgical History:   Procedure Laterality Date    BACK SURGERY      CATARACT EXTRACTION W/ INTRAOCULAR LENS IMPLANT Bilateral     CHOLECYSTECTOMY      COLONOSCOPY N/A 12/17/2019    Procedure: COLONOSCOPY; Surgeon: Amadou Hardin MD; Location: [address]); Service: Endoscopy; Laterality: N/A; pt request to do Miralax bowel prep-BB    COSMETIC SURGERY  2/10/2015    Direct mid-forehead brow lift    COSMETIC SURGERY  2/10/2015    Bilateral upper lid blepahroplasty    CYSTOSCOPY WITH URETEROSCOPY, RETROGRADE PYELOGRAPHY, AND INSERTION OF STENT Left 8/19/2020    Procedure: CYSTOSCOPY, WITH RETROGRADE PYELOGRAM AND URETERAL STENT INSERTION; Surgeon: Katelynn George MD; Location: [facility]; Service: Urology; Laterality: Left;    ESOPHAGOGASTRODUODENOSCOPY N/A 12/17/2019    Procedure: ESOPHAGOGASTRODUODENOSCOPY (EGD); Surgeon: Amadou Hardin MD; Location: [facility] ([address] [place] [address]); Service: Endoscopy; Laterality: N/A;    EYE  SURGERY      FUSION OF LUMBAR SPINE BY ANTERIOR APPROACH N/A 8/20/2020    Procedure: FUSION, SPINE, LUMBAR, ANTERIOR APPROACH L5-S1 ALIF Stand Alone; Surgeon: Jason Caldwell MD; Location: [facility] OR; Service: Neurosurgery; Laterality: N/A;    HEMORRHOID SURGERY      with complication of chronic bleeding for 6 weeks     INJECTION OF STEROID Right 12/6/2018    Procedure: INJECTION, STEROID Right SI Joint Block and Steroid Injection; Surgeon: Jason Caldwell MD; Location: [facility] OR; Service: Neurosurgery; Laterality: Right; Procedure: Right SI Joint Block and Steroid Injection   Surgery Time: 30 MIN   LOS: 0   Anesthesia: MAC   Radiology: C-arm   Bed: [place] 4 Poster   Position: Prone    INJECTION OF STEROID Right 9/19/2019    Procedure: INJECTION, STEROID Procedure: Right SI joint block nd steroid injection; Surgeon: Jason Caldwell MD; Location: [facility] OR; Service: Neurosurgery; Laterality: Right; Procedure: Right SI joint block nd steroid injection   Surgery Time: 30 mins   LOS:   Anesthesia: General MAC   Radiology:C-arm   Bed: Regular Bed   Position: Prone    JOINT REPLACEMENT      KNEE SURGERY      involving arthroscopic surgery to both knees    SINUS SURGERY      SINUS SURGERY      left molar and sinus surgery for trigeminal neuralgia    SPINAL FUSION  6/22/2015    L3-L5 XLIF    SPINE SURGERY  6/22/15    XLIF/TANA    TOTAL HIP ARTHROPLASTY  4/2012    Pt states he had total hip replacement on his left hip.           Review of patient's allergies indicates:   Allergen Reactions    Alphagan [brimonidine]      Patient taking MASO-B Selective Inhibitor Selegiline (Emsam)    Coumadin [warfarin]      itch    Oxycodone      hiccups     Social History         Social History Narrative    Not on file           Family History   Problem Relation Age of Onset    Cancer Mother     colon; uterine    Nephrolithiasis Mother     Stroke Mother 86    Pancreatitis Brother     Vision loss Brother     macular  degeneration    Diabetes Brother     Macular degeneration Brother     Migraines Sister     No Known Problems Father     No Known Problems Maternal Grandmother     No Known Problems Maternal Grandfather     No Known Problems Paternal Grandmother     No Known Problems Paternal Grandfather     No Known Problems Sister     No Known Problems Maternal Aunt     No Known Problems Maternal Uncle     No Known Problems Paternal Aunt     No Known Problems Paternal Uncle     Melanoma Neg Hx     Amblyopia Neg Hx     Blindness Neg Hx     Cataracts Neg Hx     Glaucoma Neg Hx     Hypertension Neg Hx     Retinal detachment Neg Hx     Strabismus Neg Hx     Thyroid disease Neg Hx     Allergic rhinitis Neg Hx     Allergies Neg Hx     Angioedema Neg Hx     Asthma Neg Hx     Eczema Neg Hx     Urticaria Neg Hx     Rhinitis Neg Hx     Immunodeficiency Neg Hx     Atopy Neg Hx    Current Outpatient Medications:    butalbital-acetaminophen-caffeine -40 mg (FIORICET, ESGIC) -40 mg per tablet, Take 1 tablet by mouth 6 hours as needed for Headaches., Disp: 30 tablet, Rfl: 0    clonazePAM (KLONOPIN) 0.5 MG tablet, Take 2 tablets by mouth at bedtime and 1 tablet as needed for tinnitus, Disp: 75 tablet, Rfl: 5    cyanocobalamin 1,000 mcg/mL injection, , Disp: , Rfl:    diclofenac (VOLTAREN) 50 MG EC tablet, Take 1 tablet (50 mg total) by mouth 2 (two) times daily as needed (for headache, take with food if possible)., Disp: 8 tablet, Rfl: 3    diclofenac sodium (VOLTAREN) 1 % Gel, Apply 2 g topically once daily., Disp: 100 g, Rfl: 0    galcanezumab-gnlm (EMGALITY PEN) 120 mg/mL PnIj, Inject 120 mg into the skin every 28 days., Disp: 4 mL, Rfl: 3    HYDROcodone-acetaminophen (NORCO) 5-325 mg per tablet, Take 1 tablet by mouth 2 (two) times a day., Disp: 60 tablet, Rfl: 0    lamoTRIgine (LAMICTAL) 200 MG tablet, Take 1 tablet (200 mg total) by mouth once daily., Disp: 90 tablet, Rfl: 3     methocarbamoL (ROBAXIN) 750 MG Tab, Take 1 tablet (750 mg total) by mouth 3 (three) times daily as needed. (Patient taking differently: Take 500 mg by mouth 2 (two) times daily. 1/2 tablet twice a day), Disp: 90 tablet, Rfl: 11    pravastatin (PRAVACHOL) 40 MG tablet, Take 1 tablet (40 mg total) by mouth once daily., Disp: 90 tablet, Rfl: 3    pregabalin (LYRICA) 75 MG capsule, Take 1 to 2 capsules by mouth two times daily, Disp: 90 capsule, Rfl: 0    RABEprazole (ACIPHEX) 20 mg tablet, Take 1 tablet (20 mg total) by mouth 2 (two) times daily., Disp: 180 tablet, Rfl: 3    selegiline (EMSAM) 12 mg/24 hr, Place 1 new patch onto the skin once daily., Disp: 90 patch, Rfl: 3    senna-docusate 8.6-50 mg (PERICOLACE) 8.6-50 mg per tablet, Take 2 tablets by mouth nightly as needed for Constipation., Disp: , Rfl:    tadalafil (CIALIS) 5 MG tablet, Take 1 tablet (5 mg total) by mouth once daily., Disp: 30 tablet, Rfl: 12    traZODone (DESYREL) 100 MG tablet, Take 1 tablet (100 mg total) by mouth every evening., Disp: 90 tablet, Rfl: 3    ubrogepant (UBRELVY)tablet 100 mg, Take 1 tablet (100 mg total) by mouth once as needed for Migraine., Disp: 30 tablet, Rfl: 2   Review of Systems:   Constitutional: no fever or chills   Eyes: no visual changes   ENT: no nasal congestion or sore throat   Respiratory: no cough or shortness of breath   Cardiovascular: no chest pain   Gastrointestinal: no nausea or vomiting, tolerating diet   Musculoskeletal: pain, soreness and decreased ROM   OBJECTIVE:    Vital Signs (Most Recent):   Vitals                           Body mass index is 28.51 kg/m².   Physical Exam:   Constitutional: The patient appears well-developed and well-nourished. No distress.   Head: Normocephalic and atraumatic.   Nose: Nose normal.   Eyes: Conjunctivae and EOM are normal.   Neck: No tracheal deviation present.   Cardiovascular: Normal rate and intact distal pulses.   Pulmonary/Chest: Effort normal. No  respiratory distress.   Abdominal: There is no guarding.   Neurological: The patient is alert.   Psychiatric: The patient has a normal mood and affect.   Left Hand/Wrist Examination:   Observation/Inspection:   Swelling none   Deformity none   Discoloration none   Scars none   Atrophy No thenar atrophy bilaterally   There are some Dupuytren's cords throughout the patient's left hand to the small long and ring fingers. There is no significant contracture of the MCP or PIP joints associated with this.   HAND/WRIST EXAMINATION:   Finkelstein's Test Neg   WHAT Test Neg   Snuff box tenderness Neg   Ochoa's Test Neg   Hook of Hamate Tenderness Neg   CMC grind Neg   Circumduction test Neg   Neurovascular Exam:   Digits WWP, brisk CR < 3s throughout   NVI motor/LTS to M/R/U nerves, radial pulse 2+   Tinel's Test - Carpal Tunnel Positive bilaterally   Tinel's Test - Cubital Tunnel Positive bilaterally   ROM hand/wrist/elbow full, painless. Able to make a full composite fist without extensor lag. 4/5 strength of FPL on the left, but 5/5 strength father flexors and extensors throughout bilateral hands. Able to interdigitate his thumb and small finger, and able to oppose the thumb to the base of the small finger bilaterally. Paresthesias in the median nerve distribution on the left, otherwise sensation intact to light touch throughout.   Diagnostic Results:   Xray - bilateral wrists completed today without any acute fractures or dislocations   MRI left wrist on 5/2019-Partially comminuted, nondisplaced distal radial fracture with intra-articular extension and minimal step-off.  Focal tear of the TFCC near the radial attachment.  Sprain/partial tear of the scapholunate ligament central component. Dorsal and volar bands appear intact.   Tendinosis of the ECU.   Respirations nonlabored abdomen not guarded perfusion intact   EMG - Abnormal study. There is electrophysiologic evidence of:   1. Severe bilateral carpal tunnel syndrome    2. Chronic reinnervation changes noted from the left 1st dorsal interosseous is of unclear etiology. 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 A significant drop in ulnar motor conduction velocity was not noted on this study.   ASSESSMENT/PLAN:    70 y.o. yo male with left carpal and cubital tunnel syndromes   Plan: Treatment options discussed. The patient has severe carpal tunnel syndrome on his EMG and has a strongly positive Tinel's at the elbow. He would like to proceed with a left carpal tunnel and cubital tunnel release which I feel is reasonable. We will proceed with these on December 16, 2020.   The patient has not responded to adequate non operative treatment at this time and/or non operative treatment is not indicated. Thus, the risks, benefits and alternatives to surgery were discussed with the patient in detail. Specific risks include but are not limited to bleeding, infection, vessel and/or nerve damage, pain, numbness, tingling, complex regional pain syndrome, compartment syndrome, failure to return to pre-injury and/or preoperative functional status, scar sensitivity, delayed healing, inability to return to work, pulley injury, tendon injury, bowstringing, partial and/or incomplete relief of symptoms, weakness, persistence of and/or worsening of symptoms, surgical failure, osteomyelitis, amputation, loss of function, stiffness, functional debility, dysfunction, decreased  strength, need for prolonged postoperative rehabilitation, need for further surgery, deep venous thrombosis, pulmonary embolism, arthritis and death. The patient states an understanding and wishes to proceed with surgery. All questions were answered. No guarantees were implied or stated. Written informed consent was obtained.     Addi Bauer M.D.   Please be aware that this note has been generated with the assistance of major voice-to-text. Please excuse any spelling  or grammatical errors.

## 2020-12-07 NOTE — PROGRESS NOTES
"[facility]  History & Physical  Orthopedics    SUBJECTIVE:      Chief Complaint:  Left wrist pain    Referring Provider: No ref. provider found     History of Present Illness:  Patient is a 70 y.o. right hand dominant male who presents today with complaints of left wrist pain. He reports that at the end of January, 2018 he was vacationing in [facility] when he sustained a low energy fall onto his left wrist. He was found to have an intra-articular minimally displaced distal radius fracture upon follow-up in the [place] and was treated by closed means for his fracture. He was treated with initial casting and then a removable brace which was discontinued approximately 3 weeks ago per his report.  He has been attending occupational therapy for range of motion and exercises.  He notes that he has plateaued in his recovery and feels as if he is not making much progress and is still experiencing some pain which he rates 4 to 6/10.  Of note he does have a history of Dupuytren's disease and he reports that his left small finger seems to be rotating in abnormally.  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  He reports that his pain is global but does identify an area at the volar aspect of the wrist and the ulnar aspect of the wrist which are painful.  He presents for evaluation at the request of 1 of our physician's assistants.    Interval History: Patient presents today for follow up. He is recovering well after his wrist injury. He is back to playing guitar. He states that he has full ROM and strength in his wrist and fingers while playing, though he does notes a "strange sensation" in the ulnar aspect of his hand while playing that he says is non-painful. He is happy so far with the progress of his recovery. He has some slight TTP at the ulnar styloid and volar radial wrist, but this is improving. He states that he went out of town " for a few weeks so was lost to follow up with OT and is hoping to get back into therapy.     Interval History 10/19/20:  Patient presents today for repeat presentation of his left Dupuytren's and newly diagnosed bilateral carpal tunnel syndrome.  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  At patient's last visit we had planned to observe his Dupuytren's and was not having carpal tunnel syndrome symptoms.  Endorses he has had acute worsening of the numbness throughout all digits of his bilateral hands (L>R) with associated left hand weakness for the past few months.  He is coping with his symptoms with mild improvement with bilateral nighttime carpal tunnel wrist braces and Voltaren gel provided by his neurologist but is still having significant weakness that is hindering him from his music. Also has had L5/S1 ALIF 7 weeks ago by Dr. Caldwell for severe degenerative disc disease and foraminal stenosis which he is progressing well.     Interval history December 7, 2020:  The patient returns today for re-evaluation of his left upper extremity.  He notes that he still has some issues and related to spasming in the small finger as well as numbness and tingling which has some constant see to the fingertips.  He is being actively worked up and treated by Neurology for Charcot-Selena-Tooth disease any more widespread neurologic disorder but has been found to have significant and severe carpal tunnel syndrome with some ulnar nerve dysfunction as well.  He presents today for re-evaluation with no other new complaints.    The patient is a/an musician, psychotherapist, .    Onset of symptoms/DOI was nearly 4 months ago.    Symptoms are aggravated by activity and movement.    Symptoms are alleviated by rest.    Symptoms  consist of pain.    The patient rates their pain as a 4/10.    Attempted treatment(s) and/or interventions include rest, activity modification, anti-inflammatory medications, physical and/or occupational therapy, immobilization and splinting/casting.     The patient denies any fevers, chills, N/V, D/C and presents for evaluation.       Past Medical History:   Diagnosis Date    Adenomatous polyp 2003    Adenomatous polyp     Allergy     Arthritis     Back pain     after trauma beginning in 195    Cataract     Chronic pain     neck and left shoulder    Cluster headache 2013    Colon polyp     Degenerative disc disease     Depression     Diverticulitis 12/2013    Elevated PSA     Fibromyalgia 2013    GERD (gastroesophageal reflux disease)     Hepatitis 1970's    A    History of prostate biopsy 2002    Hyperlipidemia     Joint pain     Sleep apnea     Special screening for malignant neoplasms, colon 5/5/2014    Thyroid nodule 7/16/2014    Visual disturbance 2012    problems after cataract surgery     Past Surgical History:   Procedure Laterality Date    BACK SURGERY      CATARACT EXTRACTION W/  INTRAOCULAR LENS IMPLANT Bilateral     CHOLECYSTECTOMY      COLONOSCOPY N/A 12/17/2019    Procedure: COLONOSCOPY;  Surgeon: Amadou Hardin MD;  [facility]  Service: Endoscopy;  Laterality: N/A;  pt request to do Miralax bowel prep-BB    COSMETIC SURGERY  2/10/2015    Direct mid-forehead brow lift    COSMETIC SURGERY  2/10/2015    Bilateral upper lid blepahroplasty    CYSTOSCOPY WITH URETEROSCOPY, RETROGRADE PYELOGRAPHY, AND INSERTION OF STENT Left 8/19/2020    Procedure: CYSTOSCOPY, WITH RETROGRADE PYELOGRAM AND URETERAL STENT INSERTION;  Surgeon: Katelynn George MD;  Location: [facility] OR;  Service: Urology;  Laterality: Left;    ESOPHAGOGASTRODUODENOSCOPY N/A 12/17/2019    Procedure: ESOPHAGOGASTRODUODENOSCOPY (EGD);  Surgeon: Amadou Hardin MD;  [facility]);   Service: Endoscopy;  Laterality: N/A;    EYE SURGERY      FUSION OF LUMBAR SPINE BY ANTERIOR APPROACH N/A 8/20/2020    Procedure: FUSION, SPINE, LUMBAR, ANTERIOR APPROACH L5-S1 ALIF Stand Alone;  Surgeon: Jason Caldwell MD;  Location: [facility] OR;  Service: Neurosurgery;  Laterality: N/A;    HEMORRHOID SURGERY      with complication of chronic bleeding for 6 weeks     INJECTION OF STEROID Right 12/6/2018    Procedure: INJECTION, STEROID Right SI Joint Block and Steroid Injection;  Surgeon: Jason Caldwell MD;  Location: [facility] OR;  Service: Neurosurgery;  Laterality: Right;  Procedure: Right SI Joint Block and Steroid Injection  Surgery Time: 30 MIN  LOS: 0  Anesthesia: MAC  Radiology: C-arm  Bed: [place] 4 Poster  Position: Prone    INJECTION OF STEROID Right 9/19/2019    Procedure: INJECTION, STEROID Procedure: Right SI joint block nd steroid injection;  Surgeon: Jason Caldwell MD;  Location: [facility] OR;  Service: Neurosurgery;  Laterality: Right;  Procedure: Right SI joint block nd steroid injection  Surgery Time: 30 mins  LOS:   Anesthesia: General MAC  Radiology:C-arm  Bed: Regular Bed  Position: Prone    JOINT REPLACEMENT      KNEE SURGERY      involving arthroscopic surgery to both knees    SINUS SURGERY      SINUS SURGERY      left molar and sinus surgery for trigeminal neuralgia    SPINAL FUSION  6/22/2015    L3-L5 XLIF    SPINE SURGERY  6/22/15    XLIF/TANA    TOTAL HIP ARTHROPLASTY  4/2012    Pt states he had total hip replacement on his left hip.     Review of patient's allergies indicates:   Allergen Reactions    Alphagan [brimonidine]      Patient taking MASO-B Selective Inhibitor Selegiline (Emsam)    Coumadin [warfarin]      itch    Oxycodone      hiccups     Social History     Social History Narrative    Not on file     Family History   Problem Relation Age of Onset    Cancer Mother         colon; uterine    Nephrolithiasis Mother     Stroke Mother 86    Pancreatitis Brother      Vision loss Brother         macular degeneration    Diabetes Brother     Macular degeneration Brother     Migraines Sister     No Known Problems Father     No Known Problems Maternal Grandmother     No Known Problems Maternal Grandfather     No Known Problems Paternal Grandmother     No Known Problems Paternal Grandfather     No Known Problems Sister     No Known Problems Maternal Aunt     No Known Problems Maternal Uncle     No Known Problems Paternal Aunt     No Known Problems Paternal Uncle     Melanoma Neg Hx     Amblyopia Neg Hx     Blindness Neg Hx     Cataracts Neg Hx     Glaucoma Neg Hx     Hypertension Neg Hx     Retinal detachment Neg Hx     Strabismus Neg Hx     Thyroid disease Neg Hx     Allergic rhinitis Neg Hx     Allergies Neg Hx     Angioedema Neg Hx     Asthma Neg Hx     Eczema Neg Hx     Urticaria Neg Hx     Rhinitis Neg Hx     Immunodeficiency Neg Hx     Atopy Neg Hx          Current Outpatient Medications:     butalbital-acetaminophen-caffeine -40 mg (FIORICET, ESGIC) -40 mg per tablet, Take 1 tablet by mouth 6 hours as needed for Headaches., Disp: 30 tablet, Rfl: 0    clonazePAM (KLONOPIN) 0.5 MG tablet, Take 2 tablets by mouth at bedtime and 1 tablet as needed for tinnitus, Disp: 75 tablet, Rfl: 5    cyanocobalamin 1,000 mcg/mL injection, , Disp: , Rfl:     diclofenac (VOLTAREN) 50 MG EC tablet, Take 1 tablet (50 mg total) by mouth 2 (two) times daily as needed (for headache, take with food if possible)., Disp: 8 tablet, Rfl: 3    diclofenac sodium (VOLTAREN) 1 % Gel, Apply 2 g topically once daily., Disp: 100 g, Rfl: 0    galcanezumab-gnlm (EMGALITY PEN) 120 mg/mL PnIj, Inject 120 mg into the skin every 28 days., Disp: 4 mL, Rfl: 3    HYDROcodone-acetaminophen (NORCO) 5-325 mg per tablet, Take 1 tablet by mouth 2 (two) times a day., Disp: 60 tablet, Rfl: 0    lamoTRIgine (LAMICTAL) 200 MG tablet, Take 1 tablet (200 mg total) by mouth once  "daily., Disp: 90 tablet, Rfl: 3    methocarbamoL (ROBAXIN) 750 MG Tab, Take 1 tablet (750 mg total) by mouth 3 (three) times daily as needed. (Patient taking differently: Take 500 mg by mouth 2 (two) times daily. 1/2 tablet twice a day), Disp: 90 tablet, Rfl: 11    pravastatin (PRAVACHOL) 40 MG tablet, Take 1 tablet (40 mg total) by mouth once daily., Disp: 90 tablet, Rfl: 3    pregabalin (LYRICA) 75 MG capsule, Take 1 to 2 capsules by mouth two times daily, Disp: 90 capsule, Rfl: 0    RABEprazole (ACIPHEX) 20 mg tablet, Take 1 tablet (20 mg total) by mouth 2 (two) times daily., Disp: 180 tablet, Rfl: 3    selegiline (EMSAM) 12 mg/24 hr, Place 1 new patch onto the skin once daily., Disp: 90 patch, Rfl: 3    senna-docusate 8.6-50 mg (PERICOLACE) 8.6-50 mg per tablet, Take 2 tablets by mouth nightly as needed for Constipation., Disp: , Rfl:     tadalafil (CIALIS) 5 MG tablet, Take 1 tablet (5 mg total) by mouth once daily., Disp: 30 tablet, Rfl: 12    traZODone (DESYREL) 100 MG tablet, Take 1 tablet (100 mg total) by mouth every evening., Disp: 90 tablet, Rfl: 3    ubrogepant (UBRELVY)tablet 100 mg, Take 1 tablet (100 mg total) by mouth once as needed for Migraine., Disp: 30 tablet, Rfl: 2      Review of Systems:  Constitutional: no fever or chills  Eyes: no visual changes  ENT: no nasal congestion or sore throat  Respiratory: no cough or shortness of breath  Cardiovascular: no chest pain  Gastrointestinal: no nausea or vomiting, tolerating diet  Musculoskeletal: pain, soreness and decreased ROM    OBJECTIVE:      Vital Signs (Most Recent):  Vitals:    12/07/20 1446   Weight: 82.6 kg (182 lb)   Height: 5' 7" (1.702 m)     Body mass index is 28.51 kg/m².      Physical Exam:  Constitutional: The patient appears well-developed and well-nourished. No distress.   Head: Normocephalic and atraumatic.   Nose: Nose normal.   Eyes: Conjunctivae and EOM are normal.   Neck: No tracheal deviation present. "   Cardiovascular: Normal rate and intact distal pulses.    Pulmonary/Chest: Effort normal. No respiratory distress.   Abdominal: There is no guarding.   Neurological: The patient is alert.   Psychiatric: The patient has a normal mood and affect.     Left Hand/Wrist Examination:    Observation/Inspection:  Swelling  none    Deformity  none  Discoloration  none     Scars   none    Atrophy  No thenar atrophy bilaterally  There are some Dupuytren's cords throughout the patient's left hand to the small long and ring fingers.  There is no significant contracture of the MCP or PIP joints associated with this.    HAND/WRIST EXAMINATION:  Finkelstein's Test   Neg  WHAT Test    Neg  Snuff box tenderness   Neg  Ochoa's Test    Neg  Hook of Hamate Tenderness  Neg  CMC grind    Neg  Circumduction test   Neg    Neurovascular Exam:  Digits WWP, brisk CR < 3s throughout  NVI motor/LTS to M/R/U nerves, radial pulse 2+  Tinel's Test - Carpal Tunnel  Positive bilaterally  Tinel's Test - Cubital Tunnel  Positive bilaterally    ROM hand/wrist/elbow full, painless.  Able to make a full composite fist without extensor lag.  4/5 strength of FPL on the left, but 5/5 strength father flexors and extensors throughout bilateral hands.  Able to interdigitate his thumb and small finger, and able to oppose the thumb to the base of the small finger bilaterally.  Paresthesias in the median nerve distribution on the left, otherwise sensation intact to light touch throughout.      Diagnostic Results:     Xray - bilateral wrists completed today without any acute fractures or dislocations    MRI left wrist on 5/2019-Partially comminuted, nondisplaced distal radial fracture with intra-articular extension and minimal step-off.  Focal tear of the TFCC near the radial attachment.  Sprain/partial tear of the scapholunate ligament central component.  Dorsal and volar bands appear intact.  Tendinosis of the ECU.    Respirations nonlabored abdomen not guarded  perfusion intact    EMG - Abnormal study. There is electrophysiologic evidence of:   1. Severe bilateral carpal tunnel syndrome   2. Chronic reinnervation changes noted from the left 1st dorsal interosseous is of unclear etiology. 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 A significant drop in ulnar motor conduction velocity was not noted on this study.       ASSESSMENT/PLAN:      70 y.o. yo male with left carpal and cubital tunnel syndromes  Plan:  Treatment options discussed.  The patient has severe carpal tunnel syndrome on his EMG and has a strongly positive Tinel's at the elbow.  He would like to proceed with a left carpal tunnel and cubital tunnel release which I feel is reasonable.  We will proceed with these on December 16, 2020.    The patient has not responded to adequate non operative treatment at this time and/or non operative treatment is not indicated. Thus, the risks, benefits and alternatives to surgery were discussed with the patient in detail.  Specific risks include but are not limited to bleeding, infection, vessel and/or nerve damage, pain, numbness, tingling, complex regional pain syndrome, compartment syndrome, failure to return to pre-injury and/or preoperative functional status, scar sensitivity, delayed healing, inability to return to work, pulley injury, tendon injury, bowstringing, partial and/or incomplete relief of symptoms, weakness, persistence of and/or worsening of symptoms, surgical failure, osteomyelitis, amputation, loss of function, stiffness, functional debility, dysfunction, decreased  strength, need for prolonged postoperative rehabilitation, need for further surgery, deep venous thrombosis, pulmonary embolism, arthritis and death.  The patient states an understanding and wishes to proceed with surgery.   All questions were answered.  No guarantees were implied or stated.  Written informed consent was  obtained.      Addi Bauer M.D.     Please be aware that this note has been generated with the assistance of [employer] voice-to-text.  Please excuse any spelling or grammatical errors.

## 2020-12-13 ENCOUNTER — LAB VISIT (OUTPATIENT)
Dept: SPORTS MEDICINE | Facility: CLINIC | Age: 70
End: 2020-12-13
Payer: COMMERCIAL

## 2020-12-13 DIAGNOSIS — Z01.818 PREOP TESTING: ICD-10-CM

## 2020-12-13 PROCEDURE — U0003 INFECTIOUS AGENT DETECTION BY NUCLEIC ACID (DNA OR RNA); SEVERE ACUTE RESPIRATORY SYNDROME CORONAVIRUS 2 (SARS-COV-2) (CORONAVIRUS DISEASE [COVID-19]), AMPLIFIED PROBE TECHNIQUE, MAKING USE OF HIGH THROUGHPUT TECHNOLOGIES AS DESCRIBED BY CMS-2020-01-R: HCPCS

## 2020-12-14 ENCOUNTER — PATIENT MESSAGE (OUTPATIENT)
Dept: SURGERY | Facility: HOSPITAL | Age: 70
End: 2020-12-14

## 2020-12-14 ENCOUNTER — PATIENT MESSAGE (OUTPATIENT)
Dept: PSYCHIATRY | Facility: OTHER | Age: 70
End: 2020-12-14

## 2020-12-14 LAB — SARS-COV-2 RNA RESP QL NAA+PROBE: NOT DETECTED

## 2020-12-15 ENCOUNTER — TELEPHONE (OUTPATIENT)
Dept: ORTHOPEDICS | Facility: CLINIC | Age: 70
End: 2020-12-15

## 2020-12-15 ENCOUNTER — ANESTHESIA EVENT (OUTPATIENT)
Dept: SURGERY | Facility: HOSPITAL | Age: 70
End: 2020-12-15
Payer: COMMERCIAL

## 2020-12-15 ENCOUNTER — TELEPHONE (OUTPATIENT)
Dept: GENETICS | Facility: CLINIC | Age: 70
End: 2020-12-15

## 2020-12-15 DIAGNOSIS — G89.4 CHRONIC PAIN SYNDROME: ICD-10-CM

## 2020-12-15 DIAGNOSIS — F32.A DEPRESSION, UNSPECIFIED DEPRESSION TYPE: Primary | ICD-10-CM

## 2020-12-15 RX ORDER — HYDROCODONE BITARTRATE AND ACETAMINOPHEN 5; 325 MG/1; MG/1
1 TABLET ORAL EVERY 4 HOURS PRN
Qty: 42 TABLET | Refills: 0 | Status: SHIPPED | OUTPATIENT
Start: 2020-12-15 | End: 2021-01-21 | Stop reason: SDUPTHER

## 2020-12-15 NOTE — TELEPHONE ENCOUNTER
M for the patient. Notified of 615 AM arrival time to the [facility], [facility]. Requested call back to confirm.    Danisha Shah MS, OTC  Clinical Assistant to Dr. Ross Dunbar [facility]

## 2020-12-15 NOTE — TELEPHONE ENCOUNTER
Called office of pt's psychiatrist, Dr. Dennis. Received call back advising they would attempt call again. Received follow-up call today advising that they require a release of information to speak with me. Returned call advising that our office will inform Mr. Rutherford of this and send him a release

## 2020-12-15 NOTE — ANESTHESIA PREPROCEDURE EVALUATION
12/15/2020  Raffy Rutherford Jr. is a 70 y.o., male.    Anesthesia Evaluation    I have reviewed the Patient Summary Reports.    I have reviewed the Nursing Notes. I have reviewed the NPO Status.   I have reviewed the Medications.     Review of Systems  Anesthesia Hx:  No problems with previous Anesthesia  Denies Family Hx of Anesthesia complications.    Social:  Non-Smoker, Social Alcohol Use    Hematology/Oncology:  Hematology Normal        Cardiovascular:  Cardiovascular Normal Exercise tolerance: good   Denies Angina.      Functional Capacity good / => 4 METS  Denies Congestive Heart Failure (CHF)  Denies Deep Venous Thrombosis (DVT)    Pulmonary:   Sleep Apnea, CPAP  Denies Asthma.  Denies Chronic Obstructive Pulmonary Disease (COPD).  Obstructive Sleep Apnea (WINNIE).   Renal/:  Renal/ Normal     Hepatic/GI:   GERD    Musculoskeletal:   Arthritis   Spine Disorders: lumbar Disc disease    Neurological:   Neuromuscular Disease, Headaches   Peripheral Neuropathy    Endocrine:  Endocrine Normal  Denies Diabetes  Denies Thyroid Disease    Psych:   depression          Physical Exam  General:  Well nourished    Airway/Jaw/Neck:  Airway Findings: Mouth Opening: Normal Tongue: Normal  General Airway Assessment: Adult  Mallampati: II  TM Distance: Normal, at least 6 cm       Chest/Lungs:  Chest/Lungs Findings: Clear to auscultation, Normal Respiratory Rate     Heart/Vascular:  Heart Findings: Rate: Normal  Rhythm: Regular Rhythm  Sounds: Normal        Mental Status:  Mental Status Findings:  Cooperative, Alert and Oriented         Anesthesia Plan  Type of Anesthesia, risks & benefits discussed:  Anesthesia Type:  general, MAC, regional  Patient's Preference:   Intra-op Monitoring Plan: standard ASA monitors  Intra-op Monitoring Plan Comments:   Post Op Pain Control Plan: multimodal analgesia, IV/PO Opioids PRN,  per primary service following discharge from PACU and peripheral nerve block  Post Op Pain Control Plan Comments:   Induction:   IV  Beta Blocker:  Patient is not currently on a Beta-Blocker (No further documentation required).       Informed Consent: Patient understands risks and agrees with Anesthesia plan.  Questions answered. Anesthesia consent signed with patient.  ASA Score: 3     Day of Surgery Review of History & Physical: I have interviewed and examined the patient. I have reviewed the patient's H&P dated:  There are no significant changes.          Ready For Surgery From Anesthesia Perspective.

## 2020-12-16 ENCOUNTER — HOSPITAL ENCOUNTER (OUTPATIENT)
Facility: HOSPITAL | Age: 70
Discharge: HOME | End: 2020-12-16
Attending: ORTHOPAEDIC SURGERY | Admitting: ORTHOPAEDIC SURGERY
Payer: COMMERCIAL

## 2020-12-16 ENCOUNTER — ANESTHESIA (OUTPATIENT)
Dept: SURGERY | Facility: HOSPITAL | Age: 70
End: 2020-12-16
Payer: COMMERCIAL

## 2020-12-16 DIAGNOSIS — G56.22 CUBITAL TUNNEL SYNDROME ON LEFT: Primary | ICD-10-CM

## 2020-12-16 DIAGNOSIS — G56.00 CTS (CARPAL TUNNEL SYNDROME): ICD-10-CM

## 2020-12-16 DIAGNOSIS — G56.02 CARPAL TUNNEL SYNDROME OF LEFT WRIST: ICD-10-CM

## 2020-12-16 PROCEDURE — 64721 PR REVISE MEDIAN N/CARPAL TUNNEL SURG: ICD-10-PCS | Mod: 51,LT,, | Performed by: ORTHOPAEDIC SURGERY

## 2020-12-16 PROCEDURE — 99900035 HC TECH TIME PER 15 MIN (STAT)

## 2020-12-16 PROCEDURE — 25000003 PHARM REV CODE 250: Performed by: STUDENT IN AN ORGANIZED HEALTH CARE EDUCATION/TRAINING PROGRAM

## 2020-12-16 PROCEDURE — 94770 HC EXHALED C02 TEST: CPT

## 2020-12-16 PROCEDURE — D9220A PRA ANESTHESIA: ICD-10-PCS | Mod: ANES,,, | Performed by: ANESTHESIOLOGY

## 2020-12-16 PROCEDURE — 76942 ECHO GUIDE FOR BIOPSY: CPT | Performed by: STUDENT IN AN ORGANIZED HEALTH CARE EDUCATION/TRAINING PROGRAM

## 2020-12-16 PROCEDURE — 63600175 PHARM REV CODE 636 W HCPCS: Performed by: ANESTHESIOLOGY

## 2020-12-16 PROCEDURE — 36000706: Performed by: ORTHOPAEDIC SURGERY

## 2020-12-16 PROCEDURE — 37000009 HC ANESTHESIA EA ADD 15 MINS: Performed by: ORTHOPAEDIC SURGERY

## 2020-12-16 PROCEDURE — 37000008 HC ANESTHESIA 1ST 15 MINUTES: Performed by: ORTHOPAEDIC SURGERY

## 2020-12-16 PROCEDURE — 64721 CARPAL TUNNEL SURGERY: CPT | Mod: 51,LT,, | Performed by: ORTHOPAEDIC SURGERY

## 2020-12-16 PROCEDURE — 27201423 OPTIME MED/SURG SUP & DEVICES STERILE SUPPLY: Performed by: ORTHOPAEDIC SURGERY

## 2020-12-16 PROCEDURE — D9220A PRA ANESTHESIA: ICD-10-PCS | Mod: CRNA,,, | Performed by: NURSE ANESTHETIST, CERTIFIED REGISTERED

## 2020-12-16 PROCEDURE — D9220A PRA ANESTHESIA: Mod: ANES,,, | Performed by: ANESTHESIOLOGY

## 2020-12-16 PROCEDURE — 76942 ECHO GUIDE FOR BIOPSY: CPT | Mod: 26,,, | Performed by: ANESTHESIOLOGY

## 2020-12-16 PROCEDURE — 71000015 HC POSTOP RECOV 1ST HR: Performed by: ORTHOPAEDIC SURGERY

## 2020-12-16 PROCEDURE — 64718 REVISE ULNAR NERVE AT ELBOW: CPT | Mod: LT,,, | Performed by: ORTHOPAEDIC SURGERY

## 2020-12-16 PROCEDURE — 64718 PR REVISE ULNAR NERVE AT ELBOW: ICD-10-PCS | Mod: LT,,, | Performed by: ORTHOPAEDIC SURGERY

## 2020-12-16 PROCEDURE — 63600175 PHARM REV CODE 636 W HCPCS: Performed by: NURSE ANESTHETIST, CERTIFIED REGISTERED

## 2020-12-16 PROCEDURE — 25000003 PHARM REV CODE 250: Performed by: NURSE ANESTHETIST, CERTIFIED REGISTERED

## 2020-12-16 PROCEDURE — 64415 NJX AA&/STRD BRCH PLXS IMG: CPT | Mod: 59,LT,, | Performed by: ANESTHESIOLOGY

## 2020-12-16 PROCEDURE — 63600175 PHARM REV CODE 636 W HCPCS: Performed by: STUDENT IN AN ORGANIZED HEALTH CARE EDUCATION/TRAINING PROGRAM

## 2020-12-16 PROCEDURE — 36000707: Performed by: ORTHOPAEDIC SURGERY

## 2020-12-16 PROCEDURE — D9220A PRA ANESTHESIA: Mod: CRNA,,, | Performed by: NURSE ANESTHETIST, CERTIFIED REGISTERED

## 2020-12-16 PROCEDURE — 64415 PR NERVE BLOCK INJ, ANES/STEROID, BRACHIAL PLEXUS, INCL IMAG GUIDANCE: ICD-10-PCS | Mod: 59,LT,, | Performed by: ANESTHESIOLOGY

## 2020-12-16 PROCEDURE — 64415 NJX AA&/STRD BRCH PLXS IMG: CPT | Performed by: STUDENT IN AN ORGANIZED HEALTH CARE EDUCATION/TRAINING PROGRAM

## 2020-12-16 PROCEDURE — 71000033 HC RECOVERY, INTIAL HOUR: Performed by: ORTHOPAEDIC SURGERY

## 2020-12-16 PROCEDURE — 94761 N-INVAS EAR/PLS OXIMETRY MLT: CPT

## 2020-12-16 PROCEDURE — 76942 PR U/S GUIDANCE FOR NEEDLE GUIDANCE: ICD-10-PCS | Mod: 26,,, | Performed by: ANESTHESIOLOGY

## 2020-12-16 PROCEDURE — 25000003 PHARM REV CODE 250: Performed by: ORTHOPAEDIC SURGERY

## 2020-12-16 RX ORDER — HYDROMORPHONE HYDROCHLORIDE 1 MG/ML
0.2 INJECTION, SOLUTION INTRAMUSCULAR; INTRAVENOUS; SUBCUTANEOUS EVERY 5 MIN PRN
Status: DISCONTINUED | OUTPATIENT
Start: 2020-12-16 | End: 2020-12-16 | Stop reason: HOSPADM

## 2020-12-16 RX ORDER — MUPIROCIN 20 MG/G
OINTMENT TOPICAL
Status: DISCONTINUED | OUTPATIENT
Start: 2020-12-16 | End: 2020-12-16 | Stop reason: HOSPADM

## 2020-12-16 RX ORDER — FAMOTIDINE 10 MG/ML
INJECTION INTRAVENOUS
Status: DISCONTINUED | OUTPATIENT
Start: 2020-12-16 | End: 2020-12-16

## 2020-12-16 RX ORDER — DEXAMETHASONE SODIUM PHOSPHATE 4 MG/ML
INJECTION, SOLUTION INTRA-ARTICULAR; INTRALESIONAL; INTRAMUSCULAR; INTRAVENOUS; SOFT TISSUE
Status: DISCONTINUED | OUTPATIENT
Start: 2020-12-16 | End: 2020-12-16

## 2020-12-16 RX ORDER — CEFAZOLIN SODIUM 1 G/3ML
INJECTION, POWDER, FOR SOLUTION INTRAMUSCULAR; INTRAVENOUS
Status: DISCONTINUED | OUTPATIENT
Start: 2020-12-16 | End: 2020-12-16

## 2020-12-16 RX ORDER — ONDANSETRON 2 MG/ML
INJECTION INTRAMUSCULAR; INTRAVENOUS
Status: DISCONTINUED | OUTPATIENT
Start: 2020-12-16 | End: 2020-12-16

## 2020-12-16 RX ORDER — SODIUM CHLORIDE 0.9 % (FLUSH) 0.9 %
10 SYRINGE (ML) INJECTION
Status: DISCONTINUED | OUTPATIENT
Start: 2020-12-16 | End: 2020-12-16

## 2020-12-16 RX ORDER — LIDOCAINE HYDROCHLORIDE 10 MG/ML
INJECTION, SOLUTION INTRAVENOUS
Status: DISCONTINUED | OUTPATIENT
Start: 2020-12-16 | End: 2020-12-16

## 2020-12-16 RX ORDER — CEFAZOLIN SODIUM 1 G/3ML
2 INJECTION, POWDER, FOR SOLUTION INTRAMUSCULAR; INTRAVENOUS
Status: DISCONTINUED | OUTPATIENT
Start: 2020-12-16 | End: 2020-12-16 | Stop reason: HOSPADM

## 2020-12-16 RX ORDER — FENTANYL CITRATE 50 UG/ML
25 INJECTION, SOLUTION INTRAMUSCULAR; INTRAVENOUS EVERY 5 MIN PRN
Status: DISCONTINUED | OUTPATIENT
Start: 2020-12-16 | End: 2022-12-14

## 2020-12-16 RX ORDER — FENTANYL CITRATE 50 UG/ML
25 INJECTION, SOLUTION INTRAMUSCULAR; INTRAVENOUS EVERY 5 MIN PRN
Status: DISCONTINUED | OUTPATIENT
Start: 2020-12-16 | End: 2020-12-16 | Stop reason: HOSPADM

## 2020-12-16 RX ORDER — ACETAMINOPHEN 500 MG
1000 TABLET ORAL ONCE
Status: COMPLETED | OUTPATIENT
Start: 2020-12-16 | End: 2020-12-16

## 2020-12-16 RX ORDER — PROPOFOL 10 MG/ML
VIAL (ML) INTRAVENOUS CONTINUOUS PRN
Status: DISCONTINUED | OUTPATIENT
Start: 2020-12-16 | End: 2020-12-16

## 2020-12-16 RX ORDER — HALOPERIDOL 5 MG/ML
0.5 INJECTION INTRAMUSCULAR EVERY 10 MIN PRN
Status: DISCONTINUED | OUTPATIENT
Start: 2020-12-16 | End: 2020-12-16 | Stop reason: HOSPADM

## 2020-12-16 RX ORDER — MIDAZOLAM HYDROCHLORIDE 1 MG/ML
0.5 INJECTION INTRAMUSCULAR; INTRAVENOUS
Status: DISCONTINUED | OUTPATIENT
Start: 2020-12-16 | End: 2022-12-14

## 2020-12-16 RX ORDER — CELECOXIB 200 MG/1
400 CAPSULE ORAL ONCE
Status: COMPLETED | OUTPATIENT
Start: 2020-12-16 | End: 2020-12-16

## 2020-12-16 RX ORDER — MUPIROCIN 20 MG/G
OINTMENT TOPICAL 2 TIMES DAILY
Status: DISCONTINUED | OUTPATIENT
Start: 2020-12-16 | End: 2020-12-16 | Stop reason: HOSPADM

## 2020-12-16 RX ORDER — SODIUM CHLORIDE 9 MG/ML
INJECTION, SOLUTION INTRAVENOUS CONTINUOUS PRN
Status: DISCONTINUED | OUTPATIENT
Start: 2020-12-16 | End: 2020-12-16

## 2020-12-16 RX ORDER — SODIUM CHLORIDE 0.9 % (FLUSH) 0.9 %
10 SYRINGE (ML) INJECTION
Status: DISCONTINUED | OUTPATIENT
Start: 2020-12-16 | End: 2020-12-16 | Stop reason: HOSPADM

## 2020-12-16 RX ADMIN — CELECOXIB 400 MG: 200 CAPSULE ORAL at 07:12

## 2020-12-16 RX ADMIN — MUPIROCIN: 20 OINTMENT TOPICAL at 07:12

## 2020-12-16 RX ADMIN — LIDOCAINE HYDROCHLORIDE 100 MG: 10 INJECTION, SOLUTION INTRAVENOUS at 08:12

## 2020-12-16 RX ADMIN — FENTANYL CITRATE 50 MCG: 50 INJECTION INTRAMUSCULAR; INTRAVENOUS at 07:12

## 2020-12-16 RX ADMIN — SODIUM CHLORIDE: 0.9 INJECTION, SOLUTION INTRAVENOUS at 08:12

## 2020-12-16 RX ADMIN — HALOPERIDOL LACTATE 0.5 MG: 5 INJECTION, SOLUTION INTRAMUSCULAR at 09:12

## 2020-12-16 RX ADMIN — MIDAZOLAM HYDROCHLORIDE 1 MG: 1 INJECTION, SOLUTION INTRAMUSCULAR; INTRAVENOUS at 07:12

## 2020-12-16 RX ADMIN — DEXAMETHASONE SODIUM PHOSPHATE 4 MG: 4 INJECTION, SOLUTION INTRAMUSCULAR; INTRAVENOUS at 08:12

## 2020-12-16 RX ADMIN — PROPOFOL 100 MCG/KG/MIN: 10 INJECTION, EMULSION INTRAVENOUS at 08:12

## 2020-12-16 RX ADMIN — ACETAMINOPHEN 1000 MG: 500 TABLET ORAL at 07:12

## 2020-12-16 RX ADMIN — FAMOTIDINE 20 MG: 10 INJECTION, SOLUTION INTRAVENOUS at 08:12

## 2020-12-16 RX ADMIN — ONDANSETRON 4 MG: 2 INJECTION, SOLUTION INTRAMUSCULAR; INTRAVENOUS at 08:12

## 2020-12-16 RX ADMIN — CEFAZOLIN 2 G: 330 INJECTION, POWDER, FOR SOLUTION INTRAMUSCULAR; INTRAVENOUS at 08:12

## 2020-12-16 NOTE — PLAN OF CARE
VSS. Pt able to tolerate oral liquids. Pt denies c/o pain. Dressing intact. Spouse received home meds for home use. No distress noted. Pt states he is ready for D/C. D/C instructions reviewed with pt and spouse, verbalized understanding. All questions addressed and answered.

## 2020-12-16 NOTE — TRANSFER OF CARE
"Anesthesia Transfer of Care Note    Patient: Raffy Rutherford Jr.    Procedure(s) Performed: Procedure(s) (LRB):  TRANSPOSITION, NERVE, ULNAR - left carpal and cubital tunnel releases (Left)    Patient location: PACU    Anesthesia Type: general and regional    Transport from OR: Transported from OR on room air with adequate spontaneous ventilation. Transported from OR on 6-10 L/min O2 by face mask with adequate spontaneous ventilation    Post pain: adequate analgesia    Post assessment: no apparent anesthetic complications and tolerated procedure well    Post vital signs: stable    Level of consciousness: awake    Nausea/Vomiting: no nausea/vomiting    Complications: none    Transfer of care protocol was followed      Last vitals:   Visit Vitals  /72 (BP Location: Right arm, Patient Position: Lying)   Pulse 61   Temp 36.6 °C (97.8 °F) (Oral)   Resp 16   Ht 5' 8" (1.727 m)   Wt 80.3 kg (177 lb)   SpO2 97%   BMI 26.91 kg/m²     "

## 2020-12-16 NOTE — OP NOTE
DATE OF PROCEDURE:  12/16/2020     SERVICE:  Orthopedics.     SURGEON:  Addi Bauer M.D.     FIRST ASSISTANT:  Luke Naylor MD RES.     PREOPERATIVE DIAGNOSES:  1. left cubital tunnel syndrome.  2. left carpal tunnel syndrome.     POSTOPERATIVE DIAGNOSES:  1. left cubital tunnel syndrome.  2. left carpal tunnel syndrome.     PROCEDURES PERFORMED:  1. left ulnar nerve decompression at the elbow for a cubital tunnel release.  2. left carpal tunnel release.  3.  Application of long arm posterior splint.     ANESTHESIA:  Regional MAC.     ESTIMATED BLOOD LOSS:  5 mL.     URINE OUTPUT:  No Dan.     IV FLUIDS:  Crystalloid.     SPECIMENS:  None.     COMPLICATIONS:  None.     TOURNIQUET TIME:  43 minutes at 250 mmHg.     INDICATION FOR PROCEDURE:  The patient is a 70-year-old male who   previously presented to the [facility]opedics Clinic complaining of significant   numbness, tingling and weakness throughout the left hand and upper extremity.    Preoperative workup as well as EMG nerve conduction test were significant for carpal and cubital tunnel syndromes.  The risks, benefits and   alternatives to operative intervention were discussed with the patient in   detail.  Specific risks discussed include but are not limited to bleeding, infection, vessel and/or nerve damage, pain, numbness, tingling, complex regional pain syndrome, compartment syndrome, failure to return to pre-injury and/or preoperative functional status, scar sensitivity, delayed healing, inability to return to work, pulley injury, tendon injury, bowstringing, partial and/or incomplete relief of symptoms, weakness, persistence of and/or worsening of symptoms, surgical failure, osteomyelitis, amputation, loss of function, stiffness, functional debility, dysfunction, decreased  strength, need for prolonged postoperative rehabilitation, need for further surgery, deep venous thrombosis, pulmonary embolism, arthritis and death.  The patient states an  understanding and wishes to proceed with surgery.   All questions were answered.  No guarantees were implied or stated.  Written informed consent was obtained.       PROCEDURE IN DETAIL:  On the date of the operative intervention, the patient was   evaluated in the preoperative holding area.  With their participation, the left   upper extremity was marked as the operative sites at the carpal and cubital   tunnels. The patient was then administered regional anesthesia and taken to the Operative   Theater with the lefft  upper extremity was placed on a hand table.  The let   upper extremity was then prepped and draped in the usual sterile fashion and   timeout was taken and confirmed by all present to confirm correct patient,   procedure and administration of preoperative antibiotics.  All were in   agreement, so I proceeded.  Surgical incisions were marked out first over the   carpal tunnel.  This was marked out for approximately 2 cm overlying the   patient's left carpal tunnel using Duncan's cardinal lines as well as the   radial border of the right ring finger.  After this was done, incision was   marked out overlying the right cubital tunnel for a length of approximately 8 cm   just posterior to the medial epicondyle.  Esmarch was then utilized to   exsanguinate the right upper extremity and a sterile tourniquet, which was   placed on the upper right arm previously, was then inflated to 250 mmHg where it   remained for the duration of the procedure.  I first turned my attention   towards carpal tunnel release.  The skin incision, which was previously marked   out was then made sharply with a scalpel and dissection was carried down through   the skin and subcutaneous tissues.  Careful dissection was made down to the   palmar fascia which was identified.  The palmar fascia was retracted radially   and the ulnar fat pad was taken ulnarly.  A Mosquito hemostat was utilized to   spread the superficial tissues off of  the transverse carpal ligament and then   retractors consisting of 2 rag nails and a [place] retractor were placed to expose   the ligament along its length.  At this time, the transverse carpal ligament was   then readily identified and isolated.  A scalpel was then utilized to incise   the ligament with a small poke hole incision in its mid substance.  A Mosquito   hemostat and a [place] elevator were then utilized to free the deep surface of the   ligament from underlying structures and confirm our orientation within the   carpal tunnel.  Ligament division was then completed with tenotomy scissors,   both proximally and distally.  Care was taken to ensure not to cut past Duncan's   cardinal line or enter the superficial palmar arch.  The distal fat was   visualized of the distal most aspect of our ligament division and there was no   remaining transverse carpal ligament after our division.  The distal most aspect   of the antebrachial forearm fascia was also sharply divided to ensure complete   release of the median nerve.  Mosquito hemostat was then passed both proximally   and distally to confirm that it was a complete release.  There was no remaining   constriction and at this time I then turned my attention towards the cubital   tunnel release.  The previously marked out incision just posterior to the medial   epicondyle was then made sharply with a scalpel and dissection was carried down   to skin and subcutaneous tissues.  Several branches of the medial antebrachial   cutaneous nerve were identified and protected throughout the duration of the   procedure.  Dissection was carried down more deeply just posterior to the medial   epicondyle and a gentle dissection was carried down to identify the ulnar nerve   in the proximal portion of our incision.  Dissection was then continued   proximally and the ulnar nerve was completely decompressed all the way up the   medial intermuscular septum for approximately 15 cm  and well proximal to the   arcade of [place].  Finger dissection along the course of the nerve revealed   that there was no additional constriction at this time and it was free and well   decompressed proximally.  I then continued my decompression of the ulnar nerve   more distally in the wound.  The nerve was decompressed in a proximal to distal   direction at this time, and motor branches to the flexor carpi ulnaris were   identified and protected throughout the dissection.  The FCU fascia was also   released well distal to the mid aspect of the patient's right forearm.  After   this, finger dissection was utilized to ensure complete release with no evidence   of any further constriction and I was very pleased with my decompression at   this time.  After I had ensured complete decompression with proximally and   distally, I then placed the right elbow through a full range of motion with   flexion and extension and the nerve was stable and did not sublux with full   range of motion of the elbow.  At this time, I then irrigated the wound   copiously with sterile saline.  The subcutaneous tissues at the elbow incision were closed with 4-0 Vicryl   fascia in an inverted interrupted fashion and then the skin was closed with 4-0   nylon suture.  Sterile dressing was then applied consisting of Xeroform, 4 x 4,   gauze and a long arm posterior slab splint to the right upper extremity.Tourniquet was then deflated and brisk capillary   refill ensued throughout the patient's right upper extremity.     The   patient was then awakened from anesthesia and returned to the Postanesthesia   Care Unit in stable condition.  There were no complications.  As attending   surgeon, I was present and performed the critical portion of the procedure.     POSTOPERATIVE PLAN FOR THE PATIENT:  The patient will be discharged home in   stable condition.  I will reevaluate them in approximately 2 weeks for suture   removal and reevaluation of  his postoperative plan.

## 2020-12-16 NOTE — ANESTHESIA POSTPROCEDURE EVALUATION
Anesthesia Post Evaluation    Patient: Raffy Rutherford Jr.    Procedure(s) Performed: Procedure(s) (LRB):  TRANSPOSITION, NERVE, ULNAR - left carpal and cubital tunnel releases (Left)    Final Anesthesia Type: general    Patient location during evaluation: PACU  Patient participation: Yes- Able to Participate  Level of consciousness: awake and alert  Post-procedure vital signs: reviewed and stable  Pain management: adequate  Airway patency: patent    PONV status at discharge: No PONV  Anesthetic complications: no      Cardiovascular status: blood pressure returned to baseline  Respiratory status: unassisted  Hydration status: euvolemic  Follow-up not needed.          Vitals Value Taken Time   /76 12/16/20 1001   Temp 36.4 °C (97.5 °F) 12/16/20 1010   Pulse 57 12/16/20 1011   Resp 14 12/16/20 1011   SpO2 96 % 12/16/20 1011   Vitals shown include unvalidated device data.      Event Time   Out of Recovery 10:09:00         Pain/Tequila Score: Pain Rating Prior to Med Admin: 7 (12/16/2020  7:27 AM)  Pain Rating Post Med Admin: 0 (12/16/2020 10:06 AM)  Tequila Score: 9 (12/16/2020  9:39 AM)

## 2020-12-16 NOTE — ANESTHESIA PROCEDURE NOTES
Infraclavicular Single Injection Nerve Block    Patient location during procedure: pre-op   Block not for primary anesthetic.  Reason for block: at surgeon's request and post-op pain management   Post-op Pain Location: left arm pain   Start time: 12/16/2020 7:45 AM  Timeout: 12/16/2020 7:44 AM   End time: 12/16/2020 7:55 AM    Staffing  Authorizing Provider: Karlie Tom MD  Performing Provider: Lexi Sauer DO    Preanesthetic Checklist  Completed: patient identified, site marked, surgical consent, pre-op evaluation, timeout performed, IV checked, risks and benefits discussed and monitors and equipment checked  Peripheral Block  Patient position: sitting  Prep: ChloraPrep  Patient monitoring: heart rate, cardiac monitor, continuous pulse ox, continuous capnometry and frequent blood pressure checks  Block type: infraclavicular  Laterality: left  Injection technique: single shot  Needle  Needle type: Stimuplex   Needle gauge: 21 G  Needle length: 4 in  Needle localization: ultrasound guidance and anatomical landmarks   -ultrasound image captured on disc.  Assessment  Injection assessment: negative aspiration and negative parasthesia  Paresthesia pain: none  Heart rate change: no  Slow fractionated injection: yes  Additional Notes  VSS.  [facility] RN monitoring vitals throughout procedure.  Patient tolerated procedure well.  30 cc 0.5% bupivacaine +epi injected

## 2020-12-16 NOTE — INTERVAL H&P NOTE
The patient has been examined and the H&P has been reviewed:    I concur with the findings and no changes have occurred since H&P was written.    Surgery risks, benefits and alternative options discussed and understood by patient/family.          Active Hospital Problems    Diagnosis  POA    *CTS (carpal tunnel syndrome) [G56.00]  Yes      Resolved Hospital Problems   No resolved problems to display.

## 2020-12-16 NOTE — BRIEF OP NOTE
[facility]  Brief Operative Note    Surgery Date: 12/16/2020     Surgeon(s) and Role:     * Addi Bauer MD - Primary    Assisting Surgeon: None    Pre-op Diagnosis:  Carpal tunnel syndrome of left wrist [G56.02]  Cubital tunnel syndrome on left [G56.22]    Post-op Diagnosis:  Post-Op Diagnosis Codes:     * Carpal tunnel syndrome of left wrist [G56.02]     * Cubital tunnel syndrome on left [G56.22]    Procedure(s) (LRB):  TRANSPOSITION, NERVE, ULNAR - left carpal and cubital tunnel releases (Left)    Anesthesia: Regional    Description of the findings of the procedure(s): same    Estimated Blood Loss: see op note         Specimens:   Specimen (12h ago, onward)    None            Discharge Note    OUTCOME: Patient tolerated treatment/procedure well without complication and is now ready for discharge.    DISPOSITION: Home or Self Care    FINAL DIAGNOSIS:  CTS (carpal tunnel syndrome)    FOLLOWUP: In clinic    DISCHARGE INSTRUCTIONS:    Discharge Procedure Orders   Diet general     Call MD for:  temperature >100.4     Call MD for:  persistent nausea and vomiting     Call MD for:  severe uncontrolled pain     Call MD for:  difficulty breathing, headache or visual disturbances     Call MD for:  redness, tenderness, or signs of infection (pain, swelling, redness, odor or green/yellow discharge around incision site)     Call MD for:  hives     Call MD for:  persistent dizziness or light-headedness     Call MD for:  extreme fatigue     Leave dressing on - Keep it clean, dry, and intact until clinic visit

## 2020-12-17 ENCOUNTER — PATIENT MESSAGE (OUTPATIENT)
Dept: INTERNAL MEDICINE | Facility: CLINIC | Age: 70
End: 2020-12-17

## 2020-12-17 ENCOUNTER — PATIENT MESSAGE (OUTPATIENT)
Dept: NEUROSURGERY | Facility: CLINIC | Age: 70
End: 2020-12-17

## 2020-12-17 VITALS
BODY MASS INDEX: 26.83 KG/M2 | OXYGEN SATURATION: 95 % | RESPIRATION RATE: 20 BRPM | SYSTOLIC BLOOD PRESSURE: 116 MMHG | HEART RATE: 56 BPM | HEIGHT: 68 IN | DIASTOLIC BLOOD PRESSURE: 76 MMHG | WEIGHT: 177 LBS | TEMPERATURE: 98 F

## 2020-12-17 DIAGNOSIS — Z09 FOLLOW UP: Primary | ICD-10-CM

## 2020-12-17 DIAGNOSIS — E53.8 B12 NUTRITIONAL DEFICIENCY: ICD-10-CM

## 2020-12-17 DIAGNOSIS — E78.5 HYPERLIPIDEMIA, UNSPECIFIED HYPERLIPIDEMIA TYPE: ICD-10-CM

## 2020-12-19 ENCOUNTER — PATIENT MESSAGE (OUTPATIENT)
Dept: NEUROSURGERY | Facility: CLINIC | Age: 70
End: 2020-12-19

## 2020-12-21 ENCOUNTER — OFFICE VISIT (OUTPATIENT)
Dept: NEUROSURGERY | Facility: CLINIC | Age: 70
End: 2020-12-21
Payer: COMMERCIAL

## 2020-12-21 ENCOUNTER — HOSPITAL ENCOUNTER (OUTPATIENT)
Dept: RADIOLOGY | Facility: HOSPITAL | Age: 70
Discharge: HOME | End: 2020-12-21
Attending: NEUROLOGICAL SURGERY
Payer: COMMERCIAL

## 2020-12-21 VITALS — DIASTOLIC BLOOD PRESSURE: 78 MMHG | SYSTOLIC BLOOD PRESSURE: 118 MMHG | HEART RATE: 71 BPM

## 2020-12-21 DIAGNOSIS — Z98.1 STATUS POST LUMBAR SPINAL FUSION: ICD-10-CM

## 2020-12-21 DIAGNOSIS — N52.01 ERECTILE DYSFUNCTION DUE TO ARTERIAL INSUFFICIENCY: ICD-10-CM

## 2020-12-21 DIAGNOSIS — N13.8 BPH WITH URINARY OBSTRUCTION: ICD-10-CM

## 2020-12-21 DIAGNOSIS — R39.15 URINARY URGENCY: ICD-10-CM

## 2020-12-21 DIAGNOSIS — M47.12 CERVICAL SPONDYLOSIS WITH MYELOPATHY: Primary | ICD-10-CM

## 2020-12-21 DIAGNOSIS — M21.371 RIGHT FOOT DROP: ICD-10-CM

## 2020-12-21 DIAGNOSIS — N40.1 BPH WITH URINARY OBSTRUCTION: ICD-10-CM

## 2020-12-21 DIAGNOSIS — M43.27 FUSION OF SPINE, LUMBOSACRAL REGION: ICD-10-CM

## 2020-12-21 PROCEDURE — 72100 X-RAY EXAM L-S SPINE 2/3 VWS: CPT | Mod: 26,,, | Performed by: SPECIALIST

## 2020-12-21 PROCEDURE — 99999 PR PBB SHADOW E&M-EST. PATIENT-LVL III: ICD-10-PCS | Mod: PBBFAC,,, | Performed by: NEUROLOGICAL SURGERY

## 2020-12-21 PROCEDURE — 1125F AMNT PAIN NOTED PAIN PRSNT: CPT | Mod: S$GLB,,, | Performed by: NEUROLOGICAL SURGERY

## 2020-12-21 PROCEDURE — 1159F MED LIST DOCD IN RCRD: CPT | Mod: S$GLB,,, | Performed by: NEUROLOGICAL SURGERY

## 2020-12-21 PROCEDURE — 1159F PR MEDICATION LIST DOCUMENTED IN MEDICAL RECORD: ICD-10-PCS | Mod: S$GLB,,, | Performed by: NEUROLOGICAL SURGERY

## 2020-12-21 PROCEDURE — 3288F FALL RISK ASSESSMENT DOCD: CPT | Mod: CPTII,S$GLB,, | Performed by: NEUROLOGICAL SURGERY

## 2020-12-21 PROCEDURE — 1125F PR PAIN SEVERITY QUANTIFIED, PAIN PRESENT: ICD-10-PCS | Mod: S$GLB,,, | Performed by: NEUROLOGICAL SURGERY

## 2020-12-21 PROCEDURE — 99999 PR PBB SHADOW E&M-EST. PATIENT-LVL III: CPT | Mod: PBBFAC,,, | Performed by: NEUROLOGICAL SURGERY

## 2020-12-21 PROCEDURE — 1157F PR ADVANCE CARE PLAN OR EQUIV PRESENT IN MEDICAL RECORD: ICD-10-PCS | Mod: S$GLB,,, | Performed by: NEUROLOGICAL SURGERY

## 2020-12-21 PROCEDURE — 1157F ADVNC CARE PLAN IN RCRD: CPT | Mod: S$GLB,,, | Performed by: NEUROLOGICAL SURGERY

## 2020-12-21 PROCEDURE — 1101F PR PT FALLS ASSESS DOC 0-1 FALLS W/OUT INJ PAST YR: ICD-10-PCS | Mod: CPTII,S$GLB,, | Performed by: NEUROLOGICAL SURGERY

## 2020-12-21 PROCEDURE — 72100 XR LUMBAR SPINE AP AND LATERAL: ICD-10-PCS | Mod: 26,,, | Performed by: SPECIALIST

## 2020-12-21 PROCEDURE — 1101F PT FALLS ASSESS-DOCD LE1/YR: CPT | Mod: CPTII,S$GLB,, | Performed by: NEUROLOGICAL SURGERY

## 2020-12-21 PROCEDURE — 72100 X-RAY EXAM L-S SPINE 2/3 VWS: CPT | Mod: TC,PN

## 2020-12-21 PROCEDURE — 99213 PR OFFICE/OUTPT VISIT, EST, LEVL III, 20-29 MIN: ICD-10-PCS | Mod: S$GLB,,, | Performed by: NEUROLOGICAL SURGERY

## 2020-12-21 PROCEDURE — 99213 OFFICE O/P EST LOW 20 MIN: CPT | Mod: S$GLB,,, | Performed by: NEUROLOGICAL SURGERY

## 2020-12-21 PROCEDURE — 3288F PR FALLS RISK ASSESSMENT DOCUMENTED: ICD-10-PCS | Mod: CPTII,S$GLB,, | Performed by: NEUROLOGICAL SURGERY

## 2020-12-21 RX ORDER — TADALAFIL 5 MG/1
5 TABLET ORAL DAILY
Qty: 90 TABLET | Refills: 3 | Status: SHIPPED | OUTPATIENT
Start: 2020-12-21 | End: 2021-04-06 | Stop reason: SDUPTHER

## 2020-12-21 NOTE — PROGRESS NOTES
NEUROSURGICAL PROGRESS NOTE    DATE OF SERVICE:  12/21/2020    ATTENDING PHYSICIAN:  Jason Caldwell MD    SUBJECTIVE:    INTERIM HISTORY:    This is a very pleasant 70 y.o. male, who is status post 4 months L5-S1 anterior lumbar interbody fusion.  Since the surgery reports significant pain improvement in the lumbar area.  Also his sciatica pain has improved.  He still has right foot drop and right foot numbness and was diagnosed with neuropathy.  Recently he had a left cubital tunnel and carpal tunnel decompression.  He was complaining of left hand numbness and dropping things.  Complains of gait imbalance.  No sphincter dysfunction symptoms.  He has been doing physical therapy with improvement of his function.    Low Back Pain Scale  R Low Back-Pain Score: 4  R Low Back-Pain Intensity: Pain killers give complete relief from pain  Personal Care : I can look after myself normally without causing extra pain.  Lifting: I can only lift very light weights.  Walking: Pain prevents me walking more than 1 mile.  Sitting: I can sit only in my favorite chair as long as I like.   Low Back-Standing: I cannot stand for longer than 30 mins without increasing pain   Low Back-Sleeping: I have no pain in bed  Social Life: My social life is normal, but it increases the degree of pain.   Low Back-Traveling: I have extra pain while traveling but it does not compel me to seek alternate forms of travel   Low Back-Changing Degree of Pain: My pain fluctuates but is definitely getting better         PAST MEDICAL HISTORY:  Active Ambulatory Problems     Diagnosis Date Noted    Depression     Hyperlipidemia     Chronic pain     Colon polyp     Adenomatous polyp     History of prostate biopsy     GERD (gastroesophageal reflux disease)     Back pain     Sleep apnea     Visual disturbances 10/03/2012    Spondylosis without myelopathy 11/09/2012    Degeneration of lumbar or lumbosacral intervertebral disc 11/09/2012    Spinal  stenosis, lumbar region, without neurogenic claudication 11/09/2012    Thoracic or lumbosacral neuritis or radiculitis, unspecified 11/09/2012    Displacement of lumbar intervertebral disc without myelopathy 11/09/2012    Acquired spondylolisthesis 11/09/2012    Lumbago 11/09/2012    Status post cataract extraction and insertion of intraocular lens - Both Eyes 11/13/2012    Prostatitis, acute 12/17/2012    Fibromyalgia 10/21/2013    OP (osteoporosis) 10/21/2013    Compression fracture of T12 vertebra 10/21/2013    Diverticulitis large intestine 12/21/2013    Osteoarthritis 03/19/2014    Lower back pain 04/01/2014    Lower extremity pain 04/01/2014    Muscle weakness 04/01/2014    Range of motion deficit 04/01/2014    Special screening for malignant neoplasms, colon 05/05/2014    Cervicalgia 06/19/2014    Facet joint disease of cervical region 06/19/2014    Occipital neuralgia 06/19/2014    Chronic migraine without aura, with intractable migraine, so stated, with status migrainosus 06/19/2014    Cervical radiculopathy 06/19/2014    Paroxysmal hemicrania 06/19/2014    Sciatic nerve pain 06/19/2014    Chronic LBP 06/27/2014    DJD (degenerative joint disease) of lumbar spine 06/27/2014    Chronic neck pain 06/27/2014    Right lumbar radiculopathy 06/27/2014    Thyroid nodule 07/16/2014    Carpal tunnel syndrome of right wrist 07/16/2014    Paresthesia 08/01/2014    Brow ptosis 02/10/2015    Degenerative disc disease 06/22/2015    Encounter for postoperative wound check 08/01/2015    Lumbar radiculopathy 09/15/2015    Cervical spondylosis 10/22/2015    S/P lumbar spinal fusion 12/02/2015    Right wrist tendinitis 07/28/2017    Pain in right wrist 07/28/2017    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    Headache around the eyes 06/26/2018    Closed fracture of left distal radius 02/05/2019    Pain in left wrist 03/19/2019    Bilateral foot-drop  07/16/2019    Idiopathic peripheral neuropathy 07/16/2019    Sacroiliac joint dysfunction of right side 07/16/2019    SI (sacroiliac) joint dysfunction 09/19/2019    Episodic migraine without status migrainosus, not intractable 10/25/2019    Lumbar disc herniation with radiculopathy 10/25/2019    Anxiety about health 11/12/2019    MDD (major depressive disorder), recurrent episode, moderate 11/12/2019    Anxiety 11/14/2019    Weakness of both lower extremities 11/20/2019    History of colon polyps 12/17/2019    Iron deficiency anemia 05/15/2020    Sicca syndrome with keratoconjunctivitis 05/27/2020    DDD (degenerative disc disease), lumbosacral 08/19/2020    Preoperative testing 08/20/2020    Spondylolisthesis at L5-S1 level 08/20/2020    Pain in both hands 11/05/2020    CTS (carpal tunnel syndrome) 12/16/2020     Resolved Ambulatory Problems     Diagnosis Date Noted    Blepharitis of both eyes - Both Eyes 11/13/2012    Low back pain without sciatica 10/27/2015    Lumbar radicular pain 10/27/2015    Gait abnormality 02/21/2017    Impaired functional mobility, balance, gait, and endurance 02/24/2017    Left hip pain 05/24/2017    Difficulty walking 11/01/2017    Weakness 11/01/2017    Lumbar back pain with radiculopathy affecting lower extremity 11/15/2018     Past Medical History:   Diagnosis Date    Allergy     Arthritis     Cataract     Cluster headache 2013    Diverticulitis 12/2013    Elevated PSA     Hepatitis 1970's    Joint pain     Visual disturbance 2012       PAST SURGICAL HISTORY:  Past Surgical History:   Procedure Laterality Date    BACK SURGERY      CATARACT EXTRACTION W/  INTRAOCULAR LENS IMPLANT Bilateral     CHOLECYSTECTOMY      COLONOSCOPY N/A 12/17/2019    Procedure: COLONOSCOPY;  Surgeon: Amadou Hardin MD;  Location: [address]);  Service: Endoscopy;  Laterality: N/A;  pt request to do Miralax bowel prep-BB    COSMETIC SURGERY  2/10/2015    Direct  mid-forehead brow lift    COSMETIC SURGERY  2/10/2015    Bilateral upper lid blepahroplasty    CYSTOSCOPY WITH URETEROSCOPY, RETROGRADE PYELOGRAPHY, AND INSERTION OF STENT Left 8/19/2020    Procedure: CYSTOSCOPY, WITH RETROGRADE PYELOGRAM AND URETERAL STENT INSERTION;  Surgeon: Katelynn George MD;  Location: [facility];  Service: Urology;  Laterality: Left;    ESOPHAGOGASTRODUODENOSCOPY N/A 12/17/2019    Procedure: ESOPHAGOGASTRODUODENOSCOPY (EGD);  Surgeon: Amadou Hardin MD;  [facility]  Service: Endoscopy;  Laterality: N/A;    EYE SURGERY      FUSION OF LUMBAR SPINE BY ANTERIOR APPROACH N/A 8/20/2020    Procedure: FUSION, SPINE, LUMBAR, ANTERIOR APPROACH L5-S1 ALIF Stand Alone;  Surgeon: Jason Caldwell MD;  Location: [facility];  Service: Neurosurgery;  Laterality: N/A;    HEMORRHOID SURGERY      with complication of chronic bleeding for 6 weeks     INJECTION OF STEROID Right 12/6/2018    Procedure: INJECTION, STEROID Right SI Joint Block and Steroid Injection;  Surgeon: Jason Caldwell MD;  Location: [facility];  Service: Neurosurgery;  Laterality: Right;  Procedure: Right SI Joint Block and Steroid Injection  Surgery Time: 30 MIN  LOS: 0  Anesthesia: MAC  Radiology: C-arm  Bed: [place] 4 Poster  Position: Prone    INJECTION OF STEROID Right 9/19/2019    Procedure: INJECTION, STEROID Procedure: Right SI joint block nd steroid injection;  Surgeon: Jason Caldwell MD;  Location: [facility];  Service: Neurosurgery;  Laterality: Right;  Procedure: Right SI joint block nd steroid injection  Surgery Time: 30 mins  LOS:   Anesthesia: General MAC  Radiology:C-arm  Bed: Regular Bed  Position: Prone    JOINT REPLACEMENT      KNEE SURGERY      involving arthroscopic surgery to both knees    SINUS SURGERY      SINUS SURGERY      left molar and sinus surgery for trigeminal neuralgia    SPINAL FUSION  6/22/2015    L3-L5 XLIF    SPINE SURGERY  6/22/15    XLIF/TANA    TOTAL HIP ARTHROPLASTY  4/2012     Pt states he had total hip replacement on his left hip.    ULNAR NERVE TRANSPOSITION Left 12/16/2020    Procedure: TRANSPOSITION, NERVE, ULNAR - left carpal and cubital tunnel releases;  Surgeon: Addi Bauer MD;  Location: [facility];  Service: Orthopedics;  Laterality: Left;       SOCIAL HISTORY:   Social History     Socioeconomic History    Marital status:      Spouse name: Not on file    Number of children: Not on file    Years of education: Not on file    Highest education level: Not on file   Occupational History    Occupation: Psychotherpist    Social Needs    Financial resource strain: Not hard at all    Food insecurity     Worry: Never true     Inability: Never true    Transportation needs     Medical: No     Non-medical: No   Tobacco Use    Smoking status: Never Smoker    Smokeless tobacco: Never Used   Substance and Sexual Activity    Alcohol use: Yes     Frequency: 4 or more times a week     Drinks per session: 1 or 2     Binge frequency: Never     Comment: occasion    Drug use: No    Sexual activity: Yes     Partners: Female   Lifestyle    Physical activity     Days per week: 0 days     Minutes per session: 0 min    Stress: To some extent   Relationships    Social connections     Talks on phone: More than three times a week     Gets together: Twice a week     Attends [employer] service: Patient refused     Active member of club or organization: Patient refused     Attends meetings of clubs or organizations: Patient refused     Relationship status:    Other Topics Concern    Not on file   Social History Narrative    Not on file       FAMILY HISTORY:  Family History   Problem Relation Age of Onset    Cancer Mother         colon; uterine    Nephrolithiasis Mother     Stroke Mother 86    Pancreatitis Brother     Vision loss Brother         macular degeneration    Diabetes Brother     Macular degeneration Brother     Migraines Sister     No Known Problems  Father     No Known Problems Maternal Grandmother     No Known Problems Maternal Grandfather     No Known Problems Paternal Grandmother     No Known Problems Paternal Grandfather     No Known Problems Sister     No Known Problems Maternal Aunt     No Known Problems Maternal Uncle     No Known Problems Paternal Aunt     No Known Problems Paternal Uncle     Melanoma Neg Hx     Amblyopia Neg Hx     Blindness Neg Hx     Cataracts Neg Hx     Glaucoma Neg Hx     Hypertension Neg Hx     Retinal detachment Neg Hx     Strabismus Neg Hx     Thyroid disease Neg Hx     Allergic rhinitis Neg Hx     Allergies Neg Hx     Angioedema Neg Hx     Asthma Neg Hx     Eczema Neg Hx     Urticaria Neg Hx     Rhinitis Neg Hx     Immunodeficiency Neg Hx     Atopy Neg Hx        CURRENTS MEDICATIONS:  Current Outpatient Medications on File Prior to Visit   Medication Sig Dispense Refill    butalbital-acetaminophen-caffeine -40 mg (FIORICET, ESGIC) -40 mg per tablet Take 1 tablet by mouth 6 hours as needed for Headaches. 30 tablet 0    clonazePAM (KLONOPIN) 0.5 MG tablet Take 2 tablets by mouth at bedtime and 1 tablet as needed for tinnitus 75 tablet 5    cyanocobalamin 1,000 mcg/mL injection       diclofenac (VOLTAREN) 50 MG EC tablet Take 1 tablet (50 mg total) by mouth 2 (two) times daily as needed (for headache, take with food if possible). 8 tablet 3    diclofenac sodium (VOLTAREN) 1 % Gel Apply 2 g topically once daily. 100 g 0    galcanezumab-gnlm (EMGALITY PEN) 120 mg/mL PnIj Inject 120 mg into the skin every 28 days. 4 mL 3    HYDROcodone-acetaminophen (NORCO) 5-325 mg per tablet Take 1 tablet by mouth every 4 (four) hours as needed for Pain. 42 tablet 0    lamoTRIgine (LAMICTAL) 200 MG tablet Take 1 tablet (200 mg total) by mouth once daily. 90 tablet 3    methocarbamoL (ROBAXIN) 750 MG Tab Take 1 tablet (750 mg total) by mouth 3 (three) times daily as needed. (Patient taking differently:  Take 500 mg by mouth 2 (two) times daily. 1/2 tablet twice a day) 90 tablet 11    pravastatin (PRAVACHOL) 40 MG tablet Take 1 tablet (40 mg total) by mouth once daily. 90 tablet 3    pregabalin (LYRICA) 75 MG capsule Take 1 to 2 capsules by mouth two times daily 90 capsule 0    RABEprazole (ACIPHEX) 20 mg tablet Take 1 tablet (20 mg total) by mouth 2 (two) times daily. 180 tablet 3    selegiline (EMSAM) 12 mg/24 hr Place 1 new patch onto the skin once daily. 90 patch 3    tadalafil (CIALIS) 5 MG tablet Take 1 tablet (5 mg total) by mouth once daily. 30 tablet 12    traZODone (DESYREL) 100 MG tablet Take 1 tablet (100 mg total) by mouth every evening. 90 tablet 3    senna-docusate 8.6-50 mg (PERICOLACE) 8.6-50 mg per tablet Take 2 tablets by mouth nightly as needed for Constipation.       Current Facility-Administered Medications on File Prior to Visit   Medication Dose Route Frequency Provider Last Rate Last Dose    fentaNYL injection 25 mcg  25 mcg Intravenous Q5 Min PRN Cici Lieberman MD   50 mcg at 12/16/20 0745    midazolam (VERSED) 1 mg/mL injection 0.5 mg  0.5 mg Intravenous PRN Cici Lieberman MD   1 mg at 12/16/20 0749       ALLERGIES:  Review of patient's allergies indicates:   Allergen Reactions    Alphagan [brimonidine]      Patient taking MASO-B Selective Inhibitor Selegiline (Emsam)    Coumadin [warfarin]      itch    Oxycodone      hiccups       REVIEW OF SYSTEMS:  Review of Systems   Constitutional: Negative for diaphoresis, fever and weight loss.   Respiratory: Negative for shortness of breath.    Cardiovascular: Negative for chest pain.   Gastrointestinal: Negative for blood in stool.   Genitourinary: Negative for hematuria.   Endo/Heme/Allergies: Does not bruise/bleed easily.   All other systems reviewed and are negative.        OBJECTIVE:    PHYSICAL EXAMINATION:   Vitals:    12/21/20 0736   BP: 118/78   Pulse: 71       Physical Exam:  Vitals reviewed.    Constitutional: He  appears well-developed and well-nourished.     Eyes: Pupils are equal, round, and reactive to light. Conjunctivae and EOM are normal.     Cardiovascular: Normal distal pulses and no edema.     Abdominal: Soft.     Skin: Skin displays no rash on trunk and no rash on extremities. Skin displays no lesions on trunk and no lesions on extremities.     Psych/Behavior: He is alert. He is oriented to person, place, and time. He has a normal mood and affect.     Musculoskeletal:        Neck: Range of motion is full.     Neurological:        DTRs: Tricep reflexes are 2+ on the right side and 2+ on the left side. Bicep reflexes are 2+ on the right side and 2+ on the left side. Brachioradialis reflexes are 2+ on the right side and 2+ on the left side. Patellar reflexes are 3+ on the right side and 3+ on the left side. Achilles reflexes are 0 on the right side and 0 on the left side.       Back Exam     Muscle Strength   Right Quadriceps:  5/5   Left Quadriceps:  5/5   Right Hamstrings:  5/5   Left Hamstrings:  5/5             SI joint:   Palpation at the right and left SI joints not painful  ABDIEL test is negative bilaterally  Gaenslen test is negative bilaterally  Thigh thrust test is negative bilaterally    Neurologic Exam     Mental Status   Oriented to person, place, and time.   Speech: speech is normal   Level of consciousness: alert    Cranial Nerves   Cranial nerves II through XII intact.     CN III, IV, VI   Pupils are equal, round, and reactive to light.  Extraocular motions are normal.     Motor Exam   Muscle bulk: normal  Overall muscle tone: normal    Strength   Right deltoid: 5/5  Right biceps: 5/5  Right triceps: 5/5  Right wrist flexion: 5/5  Right wrist extension: 5/5  Right interossei: 4/5  Left interossei: 4/5  Right iliopsoas: 5/5  Left iliopsoas: 5/5  Right quadriceps: 5/5  Left quadriceps: 5/5  Right hamstrin/5  Left hamstrin/5  Right anterior tibial: 2/5  Left anterior tibial: 4/5  Right posterior  tibial: 5/5  Left posterior tibial: 5/5  Right peroneal: 2/5  Left peroneal: 4/5  Right gastroc: 5/5  Left gastroc: 5/5    Sensory Exam   Light touch normal.   Pinprick normal.     Gait, Coordination, and Reflexes     Gait  Gait: wide-based    Coordination   Finger to nose coordination: normal  Tandem walking coordination: abnormal    Reflexes   Right brachioradialis: 2+  Left brachioradialis: 2+  Right biceps: 2+  Left biceps: 2+  Right triceps: 2+  Left triceps: 2+  Right patellar: 3+  Left patellar: 3+  Right achilles: 0  Left achilles: 0  Right plantar: normal  Left plantar: normal  Right De Leon: absent  Left De Leon: present  Right ankle clonus: absent  Left ankle clonus: absent        DIAGNOSTIC DATA:  I personally interpreted the following imaging:   Lumbar x-ray today shows consolidation of the L5-S1 fusion    ASSESMENT:  This is a 70 y.o. male with     Problem List Items Addressed This Visit     None      Visit Diagnoses     Cervical spondylosis with myelopathy    -  Primary    Relevant Orders    MRI Cervical Spine Without Contrast    Ambulatory referral/consult to Physical/Occupational Therapy    Status post lumbar spinal fusion        Relevant Orders    Ambulatory referral/consult to Physical/Occupational Therapy    Right foot drop        Relevant Orders    Ambulatory referral/consult to Physical/Occupational Therapy            PLAN:  Will call back after cervical spine MRI  Continue physical therapy 3 times a week for 6 weeks  Follow-up in 3 months with repeat lumbar x-ray  All questions answered          Jason Caldwell MD  Cell:[phone]

## 2020-12-21 NOTE — PROGRESS NOTES
Refilled his Tadalafil 5mg daily; 90 day supply      Lab Results   Component Value Date    PSA 6.3 (H) 10/31/2019    PSADIAG 7.0 (H) 06/05/2020       Repeat PSA with [facility]

## 2020-12-28 ENCOUNTER — OFFICE VISIT (OUTPATIENT)
Dept: ORTHOPEDICS | Facility: CLINIC | Age: 70
End: 2020-12-28
Payer: COMMERCIAL

## 2020-12-28 VITALS — HEIGHT: 68 IN | BODY MASS INDEX: 26.83 KG/M2 | WEIGHT: 177 LBS

## 2020-12-28 DIAGNOSIS — Z98.890 POST-OPERATIVE STATE: Primary | ICD-10-CM

## 2020-12-28 PROCEDURE — 99999 PR PBB SHADOW E&M-EST. PATIENT-LVL III: ICD-10-PCS | Mod: PBBFAC,,, | Performed by: PHYSICIAN ASSISTANT

## 2020-12-28 PROCEDURE — 99999 PR PBB SHADOW E&M-EST. PATIENT-LVL III: CPT | Mod: PBBFAC,,, | Performed by: PHYSICIAN ASSISTANT

## 2020-12-28 PROCEDURE — 3288F PR FALLS RISK ASSESSMENT DOCUMENTED: ICD-10-PCS | Mod: CPTII,S$GLB,, | Performed by: PHYSICIAN ASSISTANT

## 2020-12-28 PROCEDURE — 3288F FALL RISK ASSESSMENT DOCD: CPT | Mod: CPTII,S$GLB,, | Performed by: PHYSICIAN ASSISTANT

## 2020-12-28 PROCEDURE — 99024 PR POST-OP FOLLOW-UP VISIT: ICD-10-PCS | Mod: S$GLB,,, | Performed by: PHYSICIAN ASSISTANT

## 2020-12-28 PROCEDURE — 1125F AMNT PAIN NOTED PAIN PRSNT: CPT | Mod: S$GLB,,, | Performed by: PHYSICIAN ASSISTANT

## 2020-12-28 PROCEDURE — 1101F PT FALLS ASSESS-DOCD LE1/YR: CPT | Mod: CPTII,S$GLB,, | Performed by: PHYSICIAN ASSISTANT

## 2020-12-28 PROCEDURE — 3008F PR BODY MASS INDEX (BMI) DOCUMENTED: ICD-10-PCS | Mod: CPTII,S$GLB,, | Performed by: PHYSICIAN ASSISTANT

## 2020-12-28 PROCEDURE — 1157F PR ADVANCE CARE PLAN OR EQUIV PRESENT IN MEDICAL RECORD: ICD-10-PCS | Mod: S$GLB,,, | Performed by: PHYSICIAN ASSISTANT

## 2020-12-28 PROCEDURE — 1101F PR PT FALLS ASSESS DOC 0-1 FALLS W/OUT INJ PAST YR: ICD-10-PCS | Mod: CPTII,S$GLB,, | Performed by: PHYSICIAN ASSISTANT

## 2020-12-28 PROCEDURE — 99024 POSTOP FOLLOW-UP VISIT: CPT | Mod: S$GLB,,, | Performed by: PHYSICIAN ASSISTANT

## 2020-12-28 PROCEDURE — 3008F BODY MASS INDEX DOCD: CPT | Mod: CPTII,S$GLB,, | Performed by: PHYSICIAN ASSISTANT

## 2020-12-28 PROCEDURE — 1157F ADVNC CARE PLAN IN RCRD: CPT | Mod: S$GLB,,, | Performed by: PHYSICIAN ASSISTANT

## 2020-12-28 PROCEDURE — 1125F PR PAIN SEVERITY QUANTIFIED, PAIN PRESENT: ICD-10-PCS | Mod: S$GLB,,, | Performed by: PHYSICIAN ASSISTANT

## 2020-12-28 NOTE — PROGRESS NOTES
"Mr. Rutherford is here today for a post-operative visit.  He is 12 days status post left ulnar nerve decompression at the elbow and left carpal tunnel release by Dr. Bauer on 12/16/20. He reports that he is doing well. Pain is minimal. He is scheduled to begin OT 12/30/20. He denies fever, chills, and sweats since the time of the surgery.     Physical exam:    Vitals:    12/28/20 1358   Weight: 80.3 kg (177 lb)   Height: 5' 8" (1.727 m)   PainSc:   4     Vital signs are stable, patient is afebrile.  Patient is well dressed and well groomed, no acute distress.  Alert and oriented to person, place, and time.  Post op dressing taken down.  Incision is clean, dry and intact.  There is no erythema or exudate.  There is no sign of any infection. He is NVI. Sutures removed without difficulty. Good ROM elbow, wrist, fingers.    Assessment: status post left ulnar nerve decompression at the elbow and left carpal tunnel release by Dr. Bauer on 12/16/20    Plan:  Raffy was seen today for post-op evaluation and post-op evaluation.    Diagnoses and all orders for this visit:    Post-operative state    PO instruction reviewed and provided to patient  ACE applied to elbow, pt declines gel brace, can use carpal tunnel brace prn comfort/ activity   OT to begin this week   RTC 4 weeks with Dr. Bauer to discuss possible right carpal tunnel release. Call sooner if needed.       "

## 2020-12-29 ENCOUNTER — PATIENT MESSAGE (OUTPATIENT)
Dept: ORTHOPEDICS | Facility: CLINIC | Age: 70
End: 2020-12-29

## 2020-12-29 ENCOUNTER — PATIENT MESSAGE (OUTPATIENT)
Dept: INTERNAL MEDICINE | Facility: CLINIC | Age: 70
End: 2020-12-29

## 2020-12-29 ENCOUNTER — PATIENT MESSAGE (OUTPATIENT)
Dept: NEUROSURGERY | Facility: CLINIC | Age: 70
End: 2020-12-29

## 2020-12-29 NOTE — PROGRESS NOTES
[facility] Occupational Therapy  Initial Evaluation     Date: 12/30/2020  Name: Raffy Rutherford Jr.  Clinic Number: [id number]    Therapy Diagnosis:   Encounter Diagnoses   Name Primary?    Cubital tunnel syndrome on left     CTS (carpal tunnel syndrome)      Physician: Addi Bauer MD    Physician Orders: eval and treat beginning after the initial 2 week postop appointment for range of motion as tolerated.  Medical Diagnosis: Cubital tunnel syndrome L, CTS.  Surgical Procedure and Date: carpal and cubital tunnel releases, 12/16/2020 Date of Injury/Onset: cumulative trauma  Evaluation Date: 12/30/2020  Insurance Authorization Period Expiration: 1 visit  Plan of Care Certification Period: 12/30/2020 - 2/12/2021  Date of Return to MD: unknown    Visit #  Visits authorized: 1 / 1    FOTO: initial eval      Time In:1130  Time Out: 1215  Total treatment time: 45minutes  Total Timed minutes 30 minutes    Precautions:  Standard    Subjective     Involved Side: L  Dominant Side: Right  Date of Onset: gradual onset  Mechanism of Injury: repetitive use injury   Imaging: none   Previous Therapy: Prior to surgery, had some OT to treat wrist problems non-operatively.    Past Medical History/Physical Systems Review:   Raffy Rutherford Jr.  has a past medical history of Adenomatous polyp, Adenomatous polyp, Allergy, Arthritis, Back pain, Cataract, Chronic pain, Cluster headache, Colon polyp, Degenerative disc disease, Depression, Diverticulitis, Elevated PSA, Fibromyalgia, GERD (gastroesophageal reflux disease), Hepatitis, History of prostate biopsy, Hyperlipidemia, Joint pain, Sleep apnea, Special screening for malignant neoplasms, colon, Thyroid nodule, and Visual disturbance.    Raffy Rutherford Jr.  has a past surgical history that includes Sinus surgery; Knee surgery; Hemorrhoid surgery; Cholecystectomy; Sinus surgery; Eye surgery; Back surgery; Joint replacement; Total hip arthroplasty (4/2012); Cosmetic  "surgery (2/10/2015); Cosmetic surgery (2/10/2015); Spine surgery (6/22/15); Cataract extraction w/  intraocular lens implant (Bilateral); Spinal fusion (6/22/2015); Injection of steroid (Right, 12/6/2018); Injection of steroid (Right, 9/19/2019); Esophagogastroduodenoscopy (N/A, 12/17/2019); Colonoscopy (N/A, 12/17/2019); Cystoscopy with ureteroscopy, retrograde pyelography, and insertion of stent (Left, 8/19/2020); Fusion of lumbar spine by anterior approach (N/A, 8/20/2020); and Ulnar nerve transposition (Left, 12/16/2020).    Raffy has a current medication list which includes the following prescription(s): butalbital-acetaminophen-caffeine -40 mg, clonazepam, cyanocobalamin, diclofenac, diclofenac sodium, emgality pen, hydrocodone-acetaminophen, lamotrigine, methocarbamol, pravastatin, pregabalin, rabeprazole, selegiline, tadalafil, and trazodone, and the following Facility-Administered Medications: fentanyl and midazolam.    Review of patient's allergies indicates:   Allergen Reactions    Alphagan [brimonidine]      Patient taking MASO-B Selective Inhibitor Selegiline (Emsam)    Coumadin [warfarin]      itch    Oxycodone      hiccups        Patient's Goals for Therapy: Return to playing cello, upright bass, and other normal activities.    Pain:  Functional Pain Scale Rating 0-10:   3/10 on average  2/10 at best  4/10 at worst  Location: L hand incision site  Description: Aching and Dull  Aggravating Factors: "I just don't know"  Easing Factors: pain medication and ice    Occupation:  LPC, , musician  Working presently: self-employed  Duties: zoom meetings, phone calls, computer use, playing [employer] bass, cello    Functional Limitations/Social History:    Previous functional status includes: Independent with all ADLs.    Current FunctionalStatus   Home/Living environment : lives with their spouse      Limitation of Functional Status as follows:   ADLs/IADLs:     - Feeding: independent    - " "Bathing: independent    - Dressing/Grooming: independent    - Driving: independent     Leisure: Music playing, reading, writing, hiking.  Has not played "to his ability" for a year due to hand issues; has not played at all for 2 weeks.    Objective     Observation/Appearance: 3 inch incision and bruising to L elbow.  Dorsum of hand has 2 surgical incisions with limited scab formation. Healthy.    Edema. Measured in centimeters.   12/30/2020 12/30/2020    Left Right   2in. Above elbow 25 24.5   2in. Below elbow 26 27   Wrist Crease 19 17.5   MCPs 22 22     Elbow and Wrist ROM. Measured in degrees.   12/30/2020 12/30/2020    Left Right   Elbow Ext/Flex WFL WFL   Supination/Pronation WFL WFL   Wrist Ext/Flex 40/36 48/68   Wrist RD/UD WFL WFL     Hand ROM.   WFL     Strength (Dynamometer) and Pinch Strength (Pinch Gauge)  Measured in pounds. Withheld due to proximity to surgery.   12/30/2020 12/30/2020    Left Right   Rung II     [place] Pinch     3pt Pinch     2pt Pinch       Sensation   12/30/2020 12/30/2020    Left Right   [place] Jaguar     Normal 1.65-2.83 x    Diminished Light Touch 3.22-3.61     Diminished Protective 3.84-4.31  x   Loss of Protective 4.56-6.65     Untestable >6.65       Nine hole peg test  20 s R hand 9 hole peg test  23 s L hand 9 hole peg test    CMS Impairment/Limitation/Restriction for FOTO Survey to be completed after first visit.    Therapist reviewed FOTO scores for Raffy Rutherford Jr. on 12/30/2020.   FOTO documents entered into [employer] - see Media section.    Limitation Score: to be determined.       Treatment     Treatment Time In: 1145  Treatment Time Out: 1215  Total Treatment time separate from Evaluation time:30    Raffy received the following supervised modalities after being cleared for contradictions for 0 minutes:   -N/A    Raffy received the following direct contact modalities after being cleared for contraindications for 0 minutes:  -N/A    Raffy received the following manual " therapy techniques for 15 minutes:   -scar massage over operative site, then patient had good return demonstration.    Raffy received therapeutic exercises for 15 minutes including:  -Home program included in patient instructions with good return demonstration.    Raffy participated in dynamic functional therapeutic activities to improve functional performance for 0  minutes, including:  -n/a    Home Exercise Program/Education:  Issued HEP (see patient instructions in EMR) and educated on modality use for pain management . Exercises were reviewed and Raffy was able to demonstrate them prior to the end of the session.   Pt received a written copy of exercises to perform at home. Raffy demonstrated good  understanding of the education provided.  Pt was advised to perform these exercises free of pain, and to stop performing them if pain occurs.    Patient/Family Education: role of OT, goals for OT, scheduling/cancellations - pt verbalized understanding. Discussed insurance limitations with patient.    Additional Education provided: N/A    Assessment     Raffy Rutherford Jr. is a 70 y.o. male referred to outpatient occupational therapy and presents with a medical diagnosis of Cubital tunnel syndrome on left and CTS (carpal tunnel syndrome), resulting in significant weakness and hand pain and demonstrates limitations as described in the chart below. Following medical record review it is determined that pt will benefit from occupational therapy services in order to maximize pain free and/or functional use of left UE. The following goals were discussed with the patient and patient is in agreement with them as to be addressed in the treatment plan. The patient's rehab potential is Excellent.     Anticipated barriers to occupational therapy: None  Pt has no cultural, educational or language barriers to learning provided.    Profile and History Assessment of Occupational Performance Level of Clinical Decision Making  Complexity Score   Occupational Profile:   Raffy Rutherford Jr. is a 70 y.o. male who lives with their spouse and is currently self-employed as an , counselor, and musician. Raffy Rutherford Jr. has difficulty with  playing music  affecting his/her daily functional abilities. His/her main goal for therapy is return to prior level of function in music performance.     Comorbidities:   N/A    Medical and Therapy History Review:   Expanded               Performance Deficits    Physical:  No Deficits  Muscle Power/Strength  Muscle Endurance  Skin Integrity/Scar Formation  Edema   Strength  Pinch Strength    Cognitive:  No Deficits    Psychosocial:    Habits  Routines  Rituals     Clinical Decision Making:  moderate    Assessment Process:  Detailed Assessments    Modification/Need for Assistance:  Not Necessary    Intervention Selection:  Several Treatment Options       low  Based on PMHX, co morbidities , data from assessments and functional level of assistance required with task and clinical presentation directly impacting function.         Goals:   The following goals were discussed with the patient and patient is in agreement with them as to be addressed in the treatment plan.   Long Term Goals (LTGs); to be met by discharge.  LTG #1: Pt will report a pain level of 0 out of 10 with cello performance.   LTG #2: Pt will demo improved FOTO score by 20 points.   LTG #3: Pt will return to prior level of function for ADLs and household management.     Short Term Goals (STGs); to be met within 4 weeks (1/27/2021).  STG #1: Pt will report 1 out of 10 pain level with cello performance.  STG #2: Pt will demonstrate independence with issued HEP.   STG #3: Pt will demo improved L wrist flexion/extension BROOKS by 20 degrees needed to aid with playing cello.        Plan   Certification Period/Plan of care expiration: 12/30/2020 to 2/26/2021    Outpatient Occupational Therapy 2 times weekly for 8 weeks to include the  following interventions: Paraffin, Fluidotherapy, Manual therapy/joint mobilizations, Modalities for pain management, US 3 mhz, Therapeutic exercises/activities., Strengthening, Orthotic Fabrication/Fit/Training, Edema Control, Scar Management, Electrical Modalities, Joint Protection and Energy Conservation.      HUSSEIN HUSSEIN, OT

## 2020-12-30 ENCOUNTER — CLINICAL SUPPORT (OUTPATIENT)
Dept: REHABILITATION | Facility: HOSPITAL | Age: 70
End: 2020-12-30
Attending: ORTHOPAEDIC SURGERY
Payer: COMMERCIAL

## 2020-12-30 ENCOUNTER — PATIENT MESSAGE (OUTPATIENT)
Dept: PSYCHIATRY | Facility: OTHER | Age: 70
End: 2020-12-30

## 2020-12-30 DIAGNOSIS — G56.22 CUBITAL TUNNEL SYNDROME ON LEFT: ICD-10-CM

## 2020-12-30 DIAGNOSIS — G56.00 CTS (CARPAL TUNNEL SYNDROME): ICD-10-CM

## 2020-12-30 PROCEDURE — 97165 OT EVAL LOW COMPLEX 30 MIN: CPT

## 2020-12-30 PROCEDURE — 97140 MANUAL THERAPY 1/> REGIONS: CPT

## 2020-12-30 PROCEDURE — 97110 THERAPEUTIC EXERCISES: CPT

## 2020-12-30 NOTE — PATIENT INSTRUCTIONS
[facility]  [phone]  JEET LARSON, OTR/L, CHT  OCCUPATIONAL THERAPIST, CERTIFIED HAND THERAPIST        .  [facility]  [phone]  JEET LARSON, OTR/L, CHT  OCCUPATIONAL THERAPIST, CERTIFIED HAND THERAPIST

## 2021-01-03 ENCOUNTER — PATIENT MESSAGE (OUTPATIENT)
Dept: ORTHOPEDICS | Facility: CLINIC | Age: 71
End: 2021-01-03

## 2021-01-04 ENCOUNTER — PATIENT MESSAGE (OUTPATIENT)
Dept: REHABILITATION | Facility: HOSPITAL | Age: 71
End: 2021-01-04

## 2021-01-04 ENCOUNTER — HOSPITAL ENCOUNTER (OUTPATIENT)
Dept: RADIOLOGY | Facility: HOSPITAL | Age: 71
Discharge: HOME | End: 2021-01-04
Attending: NEUROLOGICAL SURGERY
Payer: COMMERCIAL

## 2021-01-04 DIAGNOSIS — M47.12 CERVICAL SPONDYLOSIS WITH MYELOPATHY: ICD-10-CM

## 2021-01-04 PROCEDURE — 72141 MRI NECK SPINE W/O DYE: CPT | Mod: 26,,, | Performed by: RADIOLOGY

## 2021-01-04 PROCEDURE — 72141 MRI NECK SPINE W/O DYE: CPT | Mod: TC

## 2021-01-04 PROCEDURE — 72141 MRI CERVICAL SPINE WITHOUT CONTRAST: ICD-10-PCS | Mod: 26,,, | Performed by: RADIOLOGY

## 2021-01-05 ENCOUNTER — PATIENT MESSAGE (OUTPATIENT)
Dept: NEUROSURGERY | Facility: CLINIC | Age: 71
End: 2021-01-05

## 2021-01-07 ENCOUNTER — CLINICAL SUPPORT (OUTPATIENT)
Dept: REHABILITATION | Facility: HOSPITAL | Age: 71
End: 2021-01-07
Attending: NEUROLOGICAL SURGERY
Payer: COMMERCIAL

## 2021-01-07 DIAGNOSIS — G56.22 CUBITAL TUNNEL SYNDROME ON LEFT: Primary | ICD-10-CM

## 2021-01-07 PROCEDURE — 97110 THERAPEUTIC EXERCISES: CPT

## 2021-01-07 PROCEDURE — 97022 WHIRLPOOL THERAPY: CPT

## 2021-01-15 NOTE — TELEPHONE ENCOUNTER
[facility] have tried to contact PT over three times with no follow up from PT. PT has been removed from the [facility] program but can rejoin at anytime pending he meets eligibility criteria.     JANETH BrownW   
15-Marc-2021 14:01

## 2021-01-16 ENCOUNTER — IMMUNIZATION (OUTPATIENT)
Dept: INTERNAL MEDICINE | Facility: CLINIC | Age: 71
End: 2021-01-16
Payer: COMMERCIAL

## 2021-01-16 DIAGNOSIS — Z23 NEED FOR VACCINATION: Primary | ICD-10-CM

## 2021-01-16 PROCEDURE — 91300 COVID-19, MRNA, LNP-S, PF, 30 MCG/0.3 ML DOSE VACCINE: CPT | Mod: PBBFAC | Performed by: INTERNAL MEDICINE

## 2021-01-25 ENCOUNTER — CLINICAL SUPPORT (OUTPATIENT)
Dept: REHABILITATION | Facility: HOSPITAL | Age: 71
End: 2021-01-25
Attending: NEUROLOGICAL SURGERY
Payer: COMMERCIAL

## 2021-01-25 ENCOUNTER — PATIENT MESSAGE (OUTPATIENT)
Dept: PSYCHIATRY | Facility: OTHER | Age: 71
End: 2021-01-25

## 2021-01-25 DIAGNOSIS — G56.22 CUBITAL TUNNEL SYNDROME ON LEFT: Primary | ICD-10-CM

## 2021-01-25 PROCEDURE — 97110 THERAPEUTIC EXERCISES: CPT

## 2021-01-25 PROCEDURE — 97530 THERAPEUTIC ACTIVITIES: CPT

## 2021-01-25 PROCEDURE — 97022 WHIRLPOOL THERAPY: CPT

## 2021-01-31 ENCOUNTER — PATIENT MESSAGE (OUTPATIENT)
Dept: ADMINISTRATIVE | Facility: OTHER | Age: 71
End: 2021-01-31

## 2021-02-02 ENCOUNTER — CLINICAL SUPPORT (OUTPATIENT)
Dept: REHABILITATION | Facility: HOSPITAL | Age: 71
End: 2021-02-02
Attending: NEUROLOGICAL SURGERY
Payer: COMMERCIAL

## 2021-02-02 DIAGNOSIS — G56.02 CARPAL TUNNEL SYNDROME OF LEFT WRIST: Primary | ICD-10-CM

## 2021-02-02 PROCEDURE — 97110 THERAPEUTIC EXERCISES: CPT

## 2021-02-02 PROCEDURE — 97033 APP MDLTY 1+IONTPHRSIS EA 15: CPT

## 2021-02-04 ENCOUNTER — PATIENT MESSAGE (OUTPATIENT)
Dept: ORTHOPEDICS | Facility: CLINIC | Age: 71
End: 2021-02-04

## 2021-02-04 ENCOUNTER — PATIENT MESSAGE (OUTPATIENT)
Dept: OPTOMETRY | Facility: CLINIC | Age: 71
End: 2021-02-04

## 2021-02-04 ENCOUNTER — PATIENT MESSAGE (OUTPATIENT)
Dept: NEUROLOGY | Facility: CLINIC | Age: 71
End: 2021-02-04

## 2021-02-04 DIAGNOSIS — G60.0 CMT (CHARCOT-MARIE-TOOTH DISEASE): Primary | ICD-10-CM

## 2021-02-06 ENCOUNTER — IMMUNIZATION (OUTPATIENT)
Dept: INTERNAL MEDICINE | Facility: CLINIC | Age: 71
End: 2021-02-06
Payer: COMMERCIAL

## 2021-02-06 DIAGNOSIS — Z23 NEED FOR VACCINATION: Primary | ICD-10-CM

## 2021-02-06 PROCEDURE — 0002A COVID-19, MRNA, LNP-S, PF, 30 MCG/0.3 ML DOSE VACCINE: CPT | Mod: PBBFAC | Performed by: INTERNAL MEDICINE

## 2021-02-06 PROCEDURE — 91300 COVID-19, MRNA, LNP-S, PF, 30 MCG/0.3 ML DOSE VACCINE: CPT | Mod: PBBFAC | Performed by: INTERNAL MEDICINE

## 2021-02-08 ENCOUNTER — CLINICAL SUPPORT (OUTPATIENT)
Dept: REHABILITATION | Facility: HOSPITAL | Age: 71
End: 2021-02-08
Attending: NEUROLOGICAL SURGERY
Payer: COMMERCIAL

## 2021-02-08 DIAGNOSIS — G56.22 CUBITAL TUNNEL SYNDROME ON LEFT: ICD-10-CM

## 2021-02-08 DIAGNOSIS — G56.02 CARPAL TUNNEL SYNDROME OF LEFT WRIST: Primary | ICD-10-CM

## 2021-02-08 PROCEDURE — 97110 THERAPEUTIC EXERCISES: CPT

## 2021-02-08 PROCEDURE — 97010 HOT OR COLD PACKS THERAPY: CPT

## 2021-02-09 ENCOUNTER — OFFICE VISIT (OUTPATIENT)
Dept: OPTOMETRY | Facility: CLINIC | Age: 71
End: 2021-02-09
Payer: COMMERCIAL

## 2021-02-09 DIAGNOSIS — H18.529 ABMD (ANTERIOR BASEMENT MEMBRANE DYSTROPHY): ICD-10-CM

## 2021-02-09 DIAGNOSIS — H18.452 SALZMANN'S NODULAR DEGENERATION OF CORNEA OF LEFT EYE: ICD-10-CM

## 2021-02-09 DIAGNOSIS — H52.4 PRESBYOPIA: Primary | ICD-10-CM

## 2021-02-09 DIAGNOSIS — H04.123 DRY EYE SYNDROME, BILATERAL: ICD-10-CM

## 2021-02-09 DIAGNOSIS — M35.01 SICCA SYNDROME WITH KERATOCONJUNCTIVITIS: ICD-10-CM

## 2021-02-09 PROCEDURE — 92015 PR REFRACTION: ICD-10-PCS | Mod: S$GLB,,, | Performed by: OPTOMETRIST

## 2021-02-09 PROCEDURE — 92012 PR EYE EXAM, EST PATIENT,INTERMED: ICD-10-PCS | Mod: S$GLB,,, | Performed by: OPTOMETRIST

## 2021-02-09 PROCEDURE — 1157F ADVNC CARE PLAN IN RCRD: CPT | Mod: S$GLB,,, | Performed by: OPTOMETRIST

## 2021-02-09 PROCEDURE — 1157F PR ADVANCE CARE PLAN OR EQUIV PRESENT IN MEDICAL RECORD: ICD-10-PCS | Mod: S$GLB,,, | Performed by: OPTOMETRIST

## 2021-02-09 PROCEDURE — 99999 PR PBB SHADOW E&M-EST. PATIENT-LVL III: CPT | Mod: PBBFAC,,, | Performed by: OPTOMETRIST

## 2021-02-09 PROCEDURE — 3288F PR FALLS RISK ASSESSMENT DOCUMENTED: ICD-10-PCS | Mod: CPTII,S$GLB,, | Performed by: OPTOMETRIST

## 2021-02-09 PROCEDURE — 1101F PT FALLS ASSESS-DOCD LE1/YR: CPT | Mod: CPTII,S$GLB,, | Performed by: OPTOMETRIST

## 2021-02-09 PROCEDURE — 1101F PR PT FALLS ASSESS DOC 0-1 FALLS W/OUT INJ PAST YR: ICD-10-PCS | Mod: CPTII,S$GLB,, | Performed by: OPTOMETRIST

## 2021-02-09 PROCEDURE — 3288F FALL RISK ASSESSMENT DOCD: CPT | Mod: CPTII,S$GLB,, | Performed by: OPTOMETRIST

## 2021-02-09 PROCEDURE — 1126F AMNT PAIN NOTED NONE PRSNT: CPT | Mod: S$GLB,,, | Performed by: OPTOMETRIST

## 2021-02-09 PROCEDURE — 99999 PR PBB SHADOW E&M-EST. PATIENT-LVL III: ICD-10-PCS | Mod: PBBFAC,,, | Performed by: OPTOMETRIST

## 2021-02-09 PROCEDURE — 92012 INTRM OPH EXAM EST PATIENT: CPT | Mod: S$GLB,,, | Performed by: OPTOMETRIST

## 2021-02-09 PROCEDURE — 92015 DETERMINE REFRACTIVE STATE: CPT | Mod: S$GLB,,, | Performed by: OPTOMETRIST

## 2021-02-09 PROCEDURE — 1126F PR PAIN SEVERITY QUANTIFIED, NO PAIN PRESENT: ICD-10-PCS | Mod: S$GLB,,, | Performed by: OPTOMETRIST

## 2021-02-15 ENCOUNTER — TELEPHONE (OUTPATIENT)
Dept: PHARMACY | Facility: CLINIC | Age: 71
End: 2021-02-15

## 2021-02-21 ENCOUNTER — PATIENT MESSAGE (OUTPATIENT)
Dept: ORTHOPEDICS | Facility: CLINIC | Age: 71
End: 2021-02-21

## 2021-02-22 ENCOUNTER — TELEPHONE (OUTPATIENT)
Dept: OPTOMETRY | Facility: CLINIC | Age: 71
End: 2021-02-22

## 2021-02-22 ENCOUNTER — CLINICAL SUPPORT (OUTPATIENT)
Dept: REHABILITATION | Facility: HOSPITAL | Age: 71
End: 2021-02-22
Attending: NEUROLOGICAL SURGERY
Payer: COMMERCIAL

## 2021-02-22 DIAGNOSIS — G56.02 CARPAL TUNNEL SYNDROME OF LEFT WRIST: Primary | ICD-10-CM

## 2021-02-22 PROCEDURE — 97022 WHIRLPOOL THERAPY: CPT

## 2021-02-22 PROCEDURE — 97110 THERAPEUTIC EXERCISES: CPT

## 2021-02-23 ENCOUNTER — PATIENT MESSAGE (OUTPATIENT)
Dept: OPTOMETRY | Facility: CLINIC | Age: 71
End: 2021-02-23

## 2021-02-25 ENCOUNTER — PATIENT MESSAGE (OUTPATIENT)
Dept: OTOLARYNGOLOGY | Facility: CLINIC | Age: 71
End: 2021-02-25

## 2021-02-28 ENCOUNTER — PATIENT MESSAGE (OUTPATIENT)
Dept: UROLOGY | Facility: CLINIC | Age: 71
End: 2021-02-28

## 2021-03-01 ENCOUNTER — TELEPHONE (OUTPATIENT)
Dept: UROLOGY | Facility: CLINIC | Age: 71
End: 2021-03-01

## 2021-03-01 ENCOUNTER — OFFICE VISIT (OUTPATIENT)
Dept: ORTHOPEDICS | Facility: CLINIC | Age: 71
End: 2021-03-01
Payer: COMMERCIAL

## 2021-03-01 ENCOUNTER — PATIENT MESSAGE (OUTPATIENT)
Dept: UROLOGY | Facility: CLINIC | Age: 71
End: 2021-03-01

## 2021-03-01 ENCOUNTER — OFFICE VISIT (OUTPATIENT)
Dept: OTOLARYNGOLOGY | Facility: CLINIC | Age: 71
End: 2021-03-01
Payer: COMMERCIAL

## 2021-03-01 VITALS — DIASTOLIC BLOOD PRESSURE: 58 MMHG | SYSTOLIC BLOOD PRESSURE: 102 MMHG | HEART RATE: 68 BPM

## 2021-03-01 DIAGNOSIS — Z78.9 HISTORY OF EXCESSIVE CERUMEN: ICD-10-CM

## 2021-03-01 DIAGNOSIS — G56.22 CUBITAL TUNNEL SYNDROME ON LEFT: Primary | ICD-10-CM

## 2021-03-01 DIAGNOSIS — H93.11 TINNITUS, RIGHT: Primary | ICD-10-CM

## 2021-03-01 DIAGNOSIS — H61.23 BILATERAL IMPACTED CERUMEN: ICD-10-CM

## 2021-03-01 DIAGNOSIS — H92.03 EAR DISCOMFORT, BILATERAL: ICD-10-CM

## 2021-03-01 PROCEDURE — 1159F PR MEDICATION LIST DOCUMENTED IN MEDICAL RECORD: ICD-10-PCS | Mod: S$GLB,,, | Performed by: ORTHOPAEDIC SURGERY

## 2021-03-01 PROCEDURE — 1157F PR ADVANCE CARE PLAN OR EQUIV PRESENT IN MEDICAL RECORD: ICD-10-PCS | Mod: S$GLB,,, | Performed by: ORTHOPAEDIC SURGERY

## 2021-03-01 PROCEDURE — 1101F PT FALLS ASSESS-DOCD LE1/YR: CPT | Mod: CPTII,S$GLB,, | Performed by: OTOLARYNGOLOGY

## 2021-03-01 PROCEDURE — 99499 UNLISTED E&M SERVICE: CPT | Mod: S$GLB,,, | Performed by: OTOLARYNGOLOGY

## 2021-03-01 PROCEDURE — 99024 PR POST-OP FOLLOW-UP VISIT: ICD-10-PCS | Mod: S$GLB,,, | Performed by: ORTHOPAEDIC SURGERY

## 2021-03-01 PROCEDURE — 69210 REMOVE IMPACTED EAR WAX UNI: CPT | Mod: S$GLB,,, | Performed by: OTOLARYNGOLOGY

## 2021-03-01 PROCEDURE — 1126F PR PAIN SEVERITY QUANTIFIED, NO PAIN PRESENT: ICD-10-PCS | Mod: S$GLB,,, | Performed by: OTOLARYNGOLOGY

## 2021-03-01 PROCEDURE — 1157F PR ADVANCE CARE PLAN OR EQUIV PRESENT IN MEDICAL RECORD: ICD-10-PCS | Mod: S$GLB,,, | Performed by: OTOLARYNGOLOGY

## 2021-03-01 PROCEDURE — 99999 PR PBB SHADOW E&M-EST. PATIENT-LVL IV: CPT | Mod: PBBFAC,,, | Performed by: OTOLARYNGOLOGY

## 2021-03-01 PROCEDURE — 3288F PR FALLS RISK ASSESSMENT DOCUMENTED: ICD-10-PCS | Mod: CPTII,S$GLB,, | Performed by: OTOLARYNGOLOGY

## 2021-03-01 PROCEDURE — 1101F PR PT FALLS ASSESS DOC 0-1 FALLS W/OUT INJ PAST YR: ICD-10-PCS | Mod: CPTII,S$GLB,, | Performed by: OTOLARYNGOLOGY

## 2021-03-01 PROCEDURE — 99499 NO LOS: ICD-10-PCS | Mod: S$GLB,,, | Performed by: OTOLARYNGOLOGY

## 2021-03-01 PROCEDURE — 99024 POSTOP FOLLOW-UP VISIT: CPT | Mod: S$GLB,,, | Performed by: ORTHOPAEDIC SURGERY

## 2021-03-01 PROCEDURE — 1157F ADVNC CARE PLAN IN RCRD: CPT | Mod: S$GLB,,, | Performed by: ORTHOPAEDIC SURGERY

## 2021-03-01 PROCEDURE — 1159F MED LIST DOCD IN RCRD: CPT | Mod: S$GLB,,, | Performed by: ORTHOPAEDIC SURGERY

## 2021-03-01 PROCEDURE — 3288F FALL RISK ASSESSMENT DOCD: CPT | Mod: CPTII,S$GLB,, | Performed by: OTOLARYNGOLOGY

## 2021-03-01 PROCEDURE — 99999 PR PBB SHADOW E&M-EST. PATIENT-LVL IV: ICD-10-PCS | Mod: PBBFAC,,, | Performed by: OTOLARYNGOLOGY

## 2021-03-01 PROCEDURE — 1126F AMNT PAIN NOTED NONE PRSNT: CPT | Mod: S$GLB,,, | Performed by: OTOLARYNGOLOGY

## 2021-03-01 PROCEDURE — 69210 PR REMOVAL IMPACTED CERUMEN REQUIRING INSTRUMENTATION, UNILATERAL: ICD-10-PCS | Mod: S$GLB,,, | Performed by: OTOLARYNGOLOGY

## 2021-03-01 PROCEDURE — 1157F ADVNC CARE PLAN IN RCRD: CPT | Mod: S$GLB,,, | Performed by: OTOLARYNGOLOGY

## 2021-03-02 ENCOUNTER — PATIENT MESSAGE (OUTPATIENT)
Dept: OTOLARYNGOLOGY | Facility: CLINIC | Age: 71
End: 2021-03-02

## 2021-03-08 ENCOUNTER — OFFICE VISIT (OUTPATIENT)
Dept: ORTHOPEDICS | Facility: CLINIC | Age: 71
End: 2021-03-08
Payer: COMMERCIAL

## 2021-03-08 VITALS — BODY MASS INDEX: 26.92 KG/M2 | WEIGHT: 177 LBS

## 2021-03-08 DIAGNOSIS — M72.0 DUPUYTREN'S CONTRACTURE OF LEFT HAND: ICD-10-CM

## 2021-03-08 DIAGNOSIS — K21.00 GERD WITH ESOPHAGITIS: ICD-10-CM

## 2021-03-08 DIAGNOSIS — M72.0 DUPUYTREN'S DISEASE OF PALM OF RIGHT HAND: Primary | ICD-10-CM

## 2021-03-08 PROCEDURE — 99999 PR PBB SHADOW E&M-EST. PATIENT-LVL III: ICD-10-PCS | Mod: PBBFAC,,, | Performed by: ORTHOPAEDIC SURGERY

## 2021-03-08 PROCEDURE — 1157F PR ADVANCE CARE PLAN OR EQUIV PRESENT IN MEDICAL RECORD: ICD-10-PCS | Mod: S$GLB,,, | Performed by: ORTHOPAEDIC SURGERY

## 2021-03-08 PROCEDURE — 1125F AMNT PAIN NOTED PAIN PRSNT: CPT | Mod: S$GLB,,, | Performed by: ORTHOPAEDIC SURGERY

## 2021-03-08 PROCEDURE — 99214 OFFICE O/P EST MOD 30 MIN: CPT | Mod: 24,S$GLB,, | Performed by: ORTHOPAEDIC SURGERY

## 2021-03-08 PROCEDURE — 99214 PR OFFICE/OUTPT VISIT, EST, LEVL IV, 30-39 MIN: ICD-10-PCS | Mod: 24,S$GLB,, | Performed by: ORTHOPAEDIC SURGERY

## 2021-03-08 PROCEDURE — 1157F ADVNC CARE PLAN IN RCRD: CPT | Mod: S$GLB,,, | Performed by: ORTHOPAEDIC SURGERY

## 2021-03-08 PROCEDURE — 1159F MED LIST DOCD IN RCRD: CPT | Mod: S$GLB,,, | Performed by: ORTHOPAEDIC SURGERY

## 2021-03-08 PROCEDURE — 99999 PR PBB SHADOW E&M-EST. PATIENT-LVL III: CPT | Mod: PBBFAC,,, | Performed by: ORTHOPAEDIC SURGERY

## 2021-03-08 PROCEDURE — 3008F PR BODY MASS INDEX (BMI) DOCUMENTED: ICD-10-PCS | Mod: CPTII,S$GLB,, | Performed by: ORTHOPAEDIC SURGERY

## 2021-03-08 PROCEDURE — 3008F BODY MASS INDEX DOCD: CPT | Mod: CPTII,S$GLB,, | Performed by: ORTHOPAEDIC SURGERY

## 2021-03-08 PROCEDURE — 1125F PR PAIN SEVERITY QUANTIFIED, PAIN PRESENT: ICD-10-PCS | Mod: S$GLB,,, | Performed by: ORTHOPAEDIC SURGERY

## 2021-03-08 PROCEDURE — 1159F PR MEDICATION LIST DOCUMENTED IN MEDICAL RECORD: ICD-10-PCS | Mod: S$GLB,,, | Performed by: ORTHOPAEDIC SURGERY

## 2021-03-09 RX ORDER — RABEPRAZOLE SODIUM 20 MG/1
20 TABLET, DELAYED RELEASE ORAL 2 TIMES DAILY
Qty: 180 TABLET | Refills: 3 | Status: SHIPPED | OUTPATIENT
Start: 2021-03-09 | End: 2022-02-27 | Stop reason: SDUPTHER

## 2021-03-16 ENCOUNTER — TELEPHONE (OUTPATIENT)
Dept: NEUROLOGY | Facility: CLINIC | Age: 71
End: 2021-03-16

## 2021-03-17 ENCOUNTER — OFFICE VISIT (OUTPATIENT)
Dept: NEUROSURGERY | Facility: CLINIC | Age: 71
End: 2021-03-17
Payer: COMMERCIAL

## 2021-03-17 ENCOUNTER — HOSPITAL ENCOUNTER (OUTPATIENT)
Dept: RADIOLOGY | Facility: HOSPITAL | Age: 71
Discharge: HOME | End: 2021-03-17
Attending: NEUROLOGICAL SURGERY
Payer: COMMERCIAL

## 2021-03-17 ENCOUNTER — TELEPHONE (OUTPATIENT)
Dept: NEUROSURGERY | Facility: CLINIC | Age: 71
End: 2021-03-17

## 2021-03-17 VITALS
WEIGHT: 177 LBS | SYSTOLIC BLOOD PRESSURE: 105 MMHG | BODY MASS INDEX: 26.83 KG/M2 | HEIGHT: 68 IN | HEART RATE: 60 BPM | DIASTOLIC BLOOD PRESSURE: 65 MMHG

## 2021-03-17 DIAGNOSIS — M43.27 FUSION OF SPINE, LUMBOSACRAL REGION: ICD-10-CM

## 2021-03-17 PROCEDURE — 1100F PTFALLS ASSESS-DOCD GE2>/YR: CPT | Mod: CPTII,S$GLB,, | Performed by: NEUROLOGICAL SURGERY

## 2021-03-17 PROCEDURE — 1125F PR PAIN SEVERITY QUANTIFIED, PAIN PRESENT: ICD-10-PCS | Mod: S$GLB,,, | Performed by: NEUROLOGICAL SURGERY

## 2021-03-17 PROCEDURE — 1159F MED LIST DOCD IN RCRD: CPT | Mod: S$GLB,,, | Performed by: NEUROLOGICAL SURGERY

## 2021-03-17 PROCEDURE — 3288F PR FALLS RISK ASSESSMENT DOCUMENTED: ICD-10-PCS | Mod: CPTII,S$GLB,, | Performed by: NEUROLOGICAL SURGERY

## 2021-03-17 PROCEDURE — 3008F PR BODY MASS INDEX (BMI) DOCUMENTED: ICD-10-PCS | Mod: CPTII,S$GLB,, | Performed by: NEUROLOGICAL SURGERY

## 2021-03-17 PROCEDURE — 3008F BODY MASS INDEX DOCD: CPT | Mod: CPTII,S$GLB,, | Performed by: NEUROLOGICAL SURGERY

## 2021-03-17 PROCEDURE — 1159F PR MEDICATION LIST DOCUMENTED IN MEDICAL RECORD: ICD-10-PCS | Mod: S$GLB,,, | Performed by: NEUROLOGICAL SURGERY

## 2021-03-17 PROCEDURE — 72100 X-RAY EXAM L-S SPINE 2/3 VWS: CPT | Mod: 26,,, | Performed by: RADIOLOGY

## 2021-03-17 PROCEDURE — 1157F ADVNC CARE PLAN IN RCRD: CPT | Mod: S$GLB,,, | Performed by: NEUROLOGICAL SURGERY

## 2021-03-17 PROCEDURE — 99212 OFFICE O/P EST SF 10 MIN: CPT | Mod: S$GLB,,, | Performed by: NEUROLOGICAL SURGERY

## 2021-03-17 PROCEDURE — 1125F AMNT PAIN NOTED PAIN PRSNT: CPT | Mod: S$GLB,,, | Performed by: NEUROLOGICAL SURGERY

## 2021-03-17 PROCEDURE — 1100F PR PT FALLS ASSESS DOC 2+ FALLS/FALL W/INJURY/YR: ICD-10-PCS | Mod: CPTII,S$GLB,, | Performed by: NEUROLOGICAL SURGERY

## 2021-03-17 PROCEDURE — 99999 PR PBB SHADOW E&M-EST. PATIENT-LVL IV: ICD-10-PCS | Mod: PBBFAC,,, | Performed by: NEUROLOGICAL SURGERY

## 2021-03-17 PROCEDURE — 99999 PR PBB SHADOW E&M-EST. PATIENT-LVL IV: CPT | Mod: PBBFAC,,, | Performed by: NEUROLOGICAL SURGERY

## 2021-03-17 PROCEDURE — 99212 PR OFFICE/OUTPT VISIT, EST, LEVL II, 10-19 MIN: ICD-10-PCS | Mod: S$GLB,,, | Performed by: NEUROLOGICAL SURGERY

## 2021-03-17 PROCEDURE — 1157F PR ADVANCE CARE PLAN OR EQUIV PRESENT IN MEDICAL RECORD: ICD-10-PCS | Mod: S$GLB,,, | Performed by: NEUROLOGICAL SURGERY

## 2021-03-17 PROCEDURE — 72100 XR LUMBAR SPINE AP AND LATERAL: ICD-10-PCS | Mod: 26,,, | Performed by: RADIOLOGY

## 2021-03-17 PROCEDURE — 3288F FALL RISK ASSESSMENT DOCD: CPT | Mod: CPTII,S$GLB,, | Performed by: NEUROLOGICAL SURGERY

## 2021-03-17 PROCEDURE — 72100 X-RAY EXAM L-S SPINE 2/3 VWS: CPT | Mod: TC,PN

## 2021-03-18 ENCOUNTER — PATIENT MESSAGE (OUTPATIENT)
Dept: RESEARCH | Facility: HOSPITAL | Age: 71
End: 2021-03-18

## 2021-03-23 ENCOUNTER — HOSPITAL ENCOUNTER (OUTPATIENT)
Dept: RADIOLOGY | Facility: HOSPITAL | Age: 71
Discharge: HOME | End: 2021-03-23
Attending: NEUROLOGICAL SURGERY
Payer: COMMERCIAL

## 2021-03-23 ENCOUNTER — PATIENT MESSAGE (OUTPATIENT)
Dept: ORTHOPEDICS | Facility: CLINIC | Age: 71
End: 2021-03-23

## 2021-03-23 DIAGNOSIS — M43.27 FUSION OF SPINE, LUMBOSACRAL REGION: ICD-10-CM

## 2021-03-23 PROCEDURE — 72131 CT LUMBAR SPINE W/O DYE: CPT | Mod: TC

## 2021-03-23 PROCEDURE — 72131 CT LUMBAR SPINE WITHOUT CONTRAST: ICD-10-PCS | Mod: 26,,, | Performed by: RADIOLOGY

## 2021-03-23 PROCEDURE — 72131 CT LUMBAR SPINE W/O DYE: CPT | Mod: 26,,, | Performed by: RADIOLOGY

## 2021-03-25 ENCOUNTER — TELEPHONE (OUTPATIENT)
Dept: NEUROSURGERY | Facility: CLINIC | Age: 71
End: 2021-03-25

## 2021-03-26 ENCOUNTER — PATIENT MESSAGE (OUTPATIENT)
Dept: RESEARCH | Facility: HOSPITAL | Age: 71
End: 2021-03-26

## 2021-04-01 ENCOUNTER — OFFICE VISIT (OUTPATIENT)
Dept: NEUROLOGY | Facility: CLINIC | Age: 71
End: 2021-04-01
Payer: COMMERCIAL

## 2021-04-01 VITALS
BODY MASS INDEX: 25.46 KG/M2 | HEIGHT: 68 IN | SYSTOLIC BLOOD PRESSURE: 100 MMHG | RESPIRATION RATE: 18 BRPM | WEIGHT: 168 LBS | DIASTOLIC BLOOD PRESSURE: 55 MMHG | TEMPERATURE: 99 F | HEART RATE: 56 BPM

## 2021-04-01 DIAGNOSIS — G43.009 MIGRAINE WITHOUT AURA AND WITHOUT STATUS MIGRAINOSUS, NOT INTRACTABLE: ICD-10-CM

## 2021-04-01 DIAGNOSIS — G60.3 IDIOPATHIC PROGRESSIVE POLYNEUROPATHY: Primary | ICD-10-CM

## 2021-04-01 PROCEDURE — 3008F BODY MASS INDEX DOCD: CPT | Mod: CPTII,S$GLB,, | Performed by: PSYCHIATRY & NEUROLOGY

## 2021-04-01 PROCEDURE — 1125F AMNT PAIN NOTED PAIN PRSNT: CPT | Mod: S$GLB,,, | Performed by: PSYCHIATRY & NEUROLOGY

## 2021-04-01 PROCEDURE — 99215 PR OFFICE/OUTPT VISIT, EST, LEVL V, 40-54 MIN: ICD-10-PCS | Mod: S$GLB,,, | Performed by: PSYCHIATRY & NEUROLOGY

## 2021-04-01 PROCEDURE — 1125F PR PAIN SEVERITY QUANTIFIED, PAIN PRESENT: ICD-10-PCS | Mod: S$GLB,,, | Performed by: PSYCHIATRY & NEUROLOGY

## 2021-04-01 PROCEDURE — 3008F PR BODY MASS INDEX (BMI) DOCUMENTED: ICD-10-PCS | Mod: CPTII,S$GLB,, | Performed by: PSYCHIATRY & NEUROLOGY

## 2021-04-01 PROCEDURE — 99999 PR PBB SHADOW E&M-EST. PATIENT-LVL IV: CPT | Mod: PBBFAC,,, | Performed by: PSYCHIATRY & NEUROLOGY

## 2021-04-01 PROCEDURE — 1159F MED LIST DOCD IN RCRD: CPT | Mod: S$GLB,,, | Performed by: PSYCHIATRY & NEUROLOGY

## 2021-04-01 PROCEDURE — 1157F PR ADVANCE CARE PLAN OR EQUIV PRESENT IN MEDICAL RECORD: ICD-10-PCS | Mod: S$GLB,,, | Performed by: PSYCHIATRY & NEUROLOGY

## 2021-04-01 PROCEDURE — 1101F PT FALLS ASSESS-DOCD LE1/YR: CPT | Mod: CPTII,S$GLB,, | Performed by: PSYCHIATRY & NEUROLOGY

## 2021-04-01 PROCEDURE — 1159F PR MEDICATION LIST DOCUMENTED IN MEDICAL RECORD: ICD-10-PCS | Mod: S$GLB,,, | Performed by: PSYCHIATRY & NEUROLOGY

## 2021-04-01 PROCEDURE — 1157F ADVNC CARE PLAN IN RCRD: CPT | Mod: S$GLB,,, | Performed by: PSYCHIATRY & NEUROLOGY

## 2021-04-01 PROCEDURE — 3288F PR FALLS RISK ASSESSMENT DOCUMENTED: ICD-10-PCS | Mod: CPTII,S$GLB,, | Performed by: PSYCHIATRY & NEUROLOGY

## 2021-04-01 PROCEDURE — 3288F FALL RISK ASSESSMENT DOCD: CPT | Mod: CPTII,S$GLB,, | Performed by: PSYCHIATRY & NEUROLOGY

## 2021-04-01 PROCEDURE — 99999 PR PBB SHADOW E&M-EST. PATIENT-LVL IV: ICD-10-PCS | Mod: PBBFAC,,, | Performed by: PSYCHIATRY & NEUROLOGY

## 2021-04-01 PROCEDURE — 99215 OFFICE O/P EST HI 40 MIN: CPT | Mod: S$GLB,,, | Performed by: PSYCHIATRY & NEUROLOGY

## 2021-04-01 PROCEDURE — 1101F PR PT FALLS ASSESS DOC 0-1 FALLS W/OUT INJ PAST YR: ICD-10-PCS | Mod: CPTII,S$GLB,, | Performed by: PSYCHIATRY & NEUROLOGY

## 2021-04-01 RX ORDER — RIMEGEPANT SULFATE 75 MG/75MG
TABLET, ORALLY DISINTEGRATING ORAL
Qty: 8 TABLET | Refills: 0 | Status: SHIPPED | OUTPATIENT
Start: 2021-04-01 | End: 2021-04-22 | Stop reason: SDUPTHER

## 2021-04-06 ENCOUNTER — PATIENT MESSAGE (OUTPATIENT)
Dept: UROLOGY | Facility: CLINIC | Age: 71
End: 2021-04-06

## 2021-04-06 ENCOUNTER — PATIENT MESSAGE (OUTPATIENT)
Dept: ORTHOPEDICS | Facility: CLINIC | Age: 71
End: 2021-04-06

## 2021-04-06 DIAGNOSIS — N52.01 ERECTILE DYSFUNCTION DUE TO ARTERIAL INSUFFICIENCY: ICD-10-CM

## 2021-04-06 DIAGNOSIS — N13.8 BPH WITH URINARY OBSTRUCTION: ICD-10-CM

## 2021-04-06 DIAGNOSIS — N40.1 BPH WITH URINARY OBSTRUCTION: ICD-10-CM

## 2021-04-06 DIAGNOSIS — R39.15 URINARY URGENCY: ICD-10-CM

## 2021-04-06 RX ORDER — TADALAFIL 5 MG/1
5 TABLET ORAL DAILY PRN
Qty: 120 TABLET | Refills: 3 | Status: SHIPPED | OUTPATIENT
Start: 2021-04-06 | End: 2022-05-13 | Stop reason: SDUPTHER

## 2021-04-07 ENCOUNTER — PATIENT MESSAGE (OUTPATIENT)
Dept: UROLOGY | Facility: CLINIC | Age: 71
End: 2021-04-07

## 2021-04-20 ENCOUNTER — PATIENT MESSAGE (OUTPATIENT)
Dept: UROLOGY | Facility: CLINIC | Age: 71
End: 2021-04-20

## 2021-04-20 ENCOUNTER — PATIENT MESSAGE (OUTPATIENT)
Dept: ORTHOPEDICS | Facility: CLINIC | Age: 71
End: 2021-04-20

## 2021-04-27 DIAGNOSIS — M72.0 DUPUYTREN'S CONTRACTURE OF BOTH HANDS: Primary | ICD-10-CM

## 2021-04-28 ENCOUNTER — OFFICE VISIT (OUTPATIENT)
Dept: ORTHOPEDICS | Facility: CLINIC | Age: 71
End: 2021-04-28
Payer: COMMERCIAL

## 2021-04-28 ENCOUNTER — LAB VISIT (OUTPATIENT)
Dept: LAB | Facility: HOSPITAL | Age: 71
End: 2021-04-28
Attending: ORTHOPAEDIC SURGERY
Payer: COMMERCIAL

## 2021-04-28 VITALS — WEIGHT: 176.56 LBS | BODY MASS INDEX: 26.85 KG/M2

## 2021-04-28 DIAGNOSIS — Z01.818 PREOP TESTING: ICD-10-CM

## 2021-04-28 DIAGNOSIS — G56.01 CARPAL TUNNEL SYNDROME OF RIGHT WRIST: Primary | ICD-10-CM

## 2021-04-28 DIAGNOSIS — M72.0 DUPUYTREN'S CONTRACTURE OF LEFT HAND: Primary | ICD-10-CM

## 2021-04-28 LAB
ANION GAP SERPL CALC-SCNC: 8 MMOL/L (ref 8–16)
BASOPHILS # BLD AUTO: 0.03 K/UL (ref 0–0.2)
BASOPHILS NFR BLD: 0.5 % (ref 0–1.9)
BUN SERPL-MCNC: 10 MG/DL (ref 8–23)
CALCIUM SERPL-MCNC: 9 MG/DL (ref 8.7–10.5)
CHLORIDE SERPL-SCNC: 104 MMOL/L (ref 95–110)
CO2 SERPL-SCNC: 27 MMOL/L (ref 23–29)
CREAT SERPL-MCNC: 0.8 MG/DL (ref 0.5–1.4)
DIFFERENTIAL METHOD: ABNORMAL
EOSINOPHIL # BLD AUTO: 0.1 K/UL (ref 0–0.5)
EOSINOPHIL NFR BLD: 2.4 % (ref 0–8)
ERYTHROCYTE [DISTWIDTH] IN BLOOD BY AUTOMATED COUNT: 23.6 % (ref 11.5–14.5)
EST. GFR  (AFRICAN AMERICAN): >60 ML/MIN/1.73 M^2
EST. GFR  (NON AFRICAN AMERICAN): >60 ML/MIN/1.73 M^2
GLUCOSE SERPL-MCNC: 98 MG/DL (ref 70–110)
HCT VFR BLD AUTO: 32 % (ref 40–54)
HGB BLD-MCNC: 8.9 G/DL (ref 14–18)
IMM GRANULOCYTES # BLD AUTO: 0 K/UL (ref 0–0.04)
IMM GRANULOCYTES NFR BLD AUTO: 0 % (ref 0–0.5)
LYMPHOCYTES # BLD AUTO: 1.9 K/UL (ref 1–4.8)
LYMPHOCYTES NFR BLD: 35.3 % (ref 18–48)
MCH RBC QN AUTO: 18.2 PG (ref 27–31)
MCHC RBC AUTO-ENTMCNC: 27.8 G/DL (ref 32–36)
MCV RBC AUTO: 65 FL (ref 82–98)
MONOCYTES # BLD AUTO: 0.5 K/UL (ref 0.3–1)
MONOCYTES NFR BLD: 8.6 % (ref 4–15)
NEUTROPHILS # BLD AUTO: 2.9 K/UL (ref 1.8–7.7)
NEUTROPHILS NFR BLD: 53.2 % (ref 38–73)
NRBC BLD-RTO: 0 /100 WBC
PLATELET # BLD AUTO: 281 K/UL (ref 150–450)
PMV BLD AUTO: 9.4 FL (ref 9.2–12.9)
POTASSIUM SERPL-SCNC: 4.3 MMOL/L (ref 3.5–5.1)
RBC # BLD AUTO: 4.9 M/UL (ref 4.6–6.2)
SODIUM SERPL-SCNC: 139 MMOL/L (ref 136–145)
WBC # BLD AUTO: 5.49 K/UL (ref 3.9–12.7)

## 2021-04-28 PROCEDURE — 1159F PR MEDICATION LIST DOCUMENTED IN MEDICAL RECORD: ICD-10-PCS | Mod: S$GLB,,, | Performed by: ORTHOPAEDIC SURGERY

## 2021-04-28 PROCEDURE — 99999 PR PBB SHADOW E&M-EST. PATIENT-LVL III: CPT | Mod: PBBFAC,,, | Performed by: ORTHOPAEDIC SURGERY

## 2021-04-28 PROCEDURE — 85025 COMPLETE CBC W/AUTO DIFF WBC: CPT | Performed by: ORTHOPAEDIC SURGERY

## 2021-04-28 PROCEDURE — 20527 PR INJ DUPUYTREN CORD W/ENZYME: ICD-10-PCS | Mod: LT,S$GLB,, | Performed by: ORTHOPAEDIC SURGERY

## 2021-04-28 PROCEDURE — 3008F PR BODY MASS INDEX (BMI) DOCUMENTED: ICD-10-PCS | Mod: CPTII,S$GLB,, | Performed by: ORTHOPAEDIC SURGERY

## 2021-04-28 PROCEDURE — 1159F MED LIST DOCD IN RCRD: CPT | Mod: S$GLB,,, | Performed by: ORTHOPAEDIC SURGERY

## 2021-04-28 PROCEDURE — 99999 PR PBB SHADOW E&M-EST. PATIENT-LVL III: ICD-10-PCS | Mod: PBBFAC,,, | Performed by: ORTHOPAEDIC SURGERY

## 2021-04-28 PROCEDURE — 99499 UNLISTED E&M SERVICE: CPT | Mod: ,,, | Performed by: ORTHOPAEDIC SURGERY

## 2021-04-28 PROCEDURE — 99499 NO LOS: ICD-10-PCS | Mod: ,,, | Performed by: ORTHOPAEDIC SURGERY

## 2021-04-28 PROCEDURE — 1157F PR ADVANCE CARE PLAN OR EQUIV PRESENT IN MEDICAL RECORD: ICD-10-PCS | Mod: S$GLB,,, | Performed by: ORTHOPAEDIC SURGERY

## 2021-04-28 PROCEDURE — 3008F BODY MASS INDEX DOCD: CPT | Mod: CPTII,S$GLB,, | Performed by: ORTHOPAEDIC SURGERY

## 2021-04-28 PROCEDURE — 1125F AMNT PAIN NOTED PAIN PRSNT: CPT | Mod: S$GLB,,, | Performed by: ORTHOPAEDIC SURGERY

## 2021-04-28 PROCEDURE — 20527 NJX NZM PALMAR FASCIAL CORD: CPT | Mod: LT,S$GLB,, | Performed by: ORTHOPAEDIC SURGERY

## 2021-04-28 PROCEDURE — 36415 COLL VENOUS BLD VENIPUNCTURE: CPT | Mod: PO | Performed by: ORTHOPAEDIC SURGERY

## 2021-04-28 PROCEDURE — 80048 BASIC METABOLIC PNL TOTAL CA: CPT | Performed by: ORTHOPAEDIC SURGERY

## 2021-04-28 PROCEDURE — 1157F ADVNC CARE PLAN IN RCRD: CPT | Mod: S$GLB,,, | Performed by: ORTHOPAEDIC SURGERY

## 2021-04-28 PROCEDURE — 1125F PR PAIN SEVERITY QUANTIFIED, PAIN PRESENT: ICD-10-PCS | Mod: S$GLB,,, | Performed by: ORTHOPAEDIC SURGERY

## 2021-04-29 ENCOUNTER — PATIENT MESSAGE (OUTPATIENT)
Dept: ORTHOPEDICS | Facility: CLINIC | Age: 71
End: 2021-04-29

## 2021-04-30 ENCOUNTER — OFFICE VISIT (OUTPATIENT)
Dept: ORTHOPEDICS | Facility: CLINIC | Age: 71
End: 2021-04-30
Payer: COMMERCIAL

## 2021-04-30 ENCOUNTER — CLINICAL SUPPORT (OUTPATIENT)
Dept: REHABILITATION | Facility: HOSPITAL | Age: 71
End: 2021-04-30
Attending: ORTHOPAEDIC SURGERY
Payer: COMMERCIAL

## 2021-04-30 DIAGNOSIS — G56.01 CARPAL TUNNEL SYNDROME OF RIGHT WRIST: Primary | ICD-10-CM

## 2021-04-30 DIAGNOSIS — M72.0 DUPUYTREN'S CONTRACTURE OF LEFT HAND: ICD-10-CM

## 2021-04-30 DIAGNOSIS — M72.0 DUPUYTREN'S CONTRACTURE OF BOTH HANDS: ICD-10-CM

## 2021-04-30 DIAGNOSIS — Z01.818 PRE-OP TESTING: Primary | ICD-10-CM

## 2021-04-30 PROBLEM — M79.641 PAIN IN BOTH HANDS: Status: RESOLVED | Noted: 2020-11-05 | Resolved: 2021-04-30

## 2021-04-30 PROBLEM — M79.642 PAIN IN BOTH HANDS: Status: RESOLVED | Noted: 2020-11-05 | Resolved: 2021-04-30

## 2021-04-30 PROBLEM — R29.898 WEAKNESS OF BOTH LOWER EXTREMITIES: Status: RESOLVED | Noted: 2019-11-20 | Resolved: 2021-04-30

## 2021-04-30 PROBLEM — M25.532 PAIN IN LEFT WRIST: Status: RESOLVED | Noted: 2019-03-19 | Resolved: 2021-04-30

## 2021-04-30 PROCEDURE — 99499 NO LOS: ICD-10-PCS | Mod: S$GLB,,, | Performed by: ORTHOPAEDIC SURGERY

## 2021-04-30 PROCEDURE — 1157F ADVNC CARE PLAN IN RCRD: CPT | Mod: S$GLB,,, | Performed by: ORTHOPAEDIC SURGERY

## 2021-04-30 PROCEDURE — 99499 UNLISTED E&M SERVICE: CPT | Mod: S$GLB,,, | Performed by: ORTHOPAEDIC SURGERY

## 2021-04-30 PROCEDURE — L3913 HFO W/O JOINTS CF: HCPCS

## 2021-04-30 PROCEDURE — 97760 ORTHOTIC MGMT&TRAING 1ST ENC: CPT

## 2021-04-30 PROCEDURE — 1157F PR ADVANCE CARE PLAN OR EQUIV PRESENT IN MEDICAL RECORD: ICD-10-PCS | Mod: S$GLB,,, | Performed by: ORTHOPAEDIC SURGERY

## 2021-04-30 PROCEDURE — 26341 PR MANIPULAT PALM CORD POST INJ: ICD-10-PCS | Mod: LT,S$GLB,, | Performed by: ORTHOPAEDIC SURGERY

## 2021-04-30 PROCEDURE — 26341 MANIPULAT PALM CORD POST INJ: CPT | Mod: LT,S$GLB,, | Performed by: ORTHOPAEDIC SURGERY

## 2021-04-30 PROCEDURE — 1125F AMNT PAIN NOTED PAIN PRSNT: CPT | Mod: S$GLB,,, | Performed by: ORTHOPAEDIC SURGERY

## 2021-04-30 PROCEDURE — 99999 PR PBB SHADOW E&M-EST. PATIENT-LVL III: ICD-10-PCS | Mod: PBBFAC,,, | Performed by: ORTHOPAEDIC SURGERY

## 2021-04-30 PROCEDURE — 99999 PR PBB SHADOW E&M-EST. PATIENT-LVL III: CPT | Mod: PBBFAC,,, | Performed by: ORTHOPAEDIC SURGERY

## 2021-04-30 PROCEDURE — 1125F PR PAIN SEVERITY QUANTIFIED, PAIN PRESENT: ICD-10-PCS | Mod: S$GLB,,, | Performed by: ORTHOPAEDIC SURGERY

## 2021-05-03 ENCOUNTER — LAB VISIT (OUTPATIENT)
Dept: LAB | Facility: HOSPITAL | Age: 71
End: 2021-05-03
Payer: COMMERCIAL

## 2021-05-03 ENCOUNTER — TELEPHONE (OUTPATIENT)
Dept: HEMATOLOGY/ONCOLOGY | Facility: CLINIC | Age: 71
End: 2021-05-03

## 2021-05-03 ENCOUNTER — OFFICE VISIT (OUTPATIENT)
Dept: UROLOGY | Facility: CLINIC | Age: 71
End: 2021-05-03
Payer: COMMERCIAL

## 2021-05-03 VITALS
SYSTOLIC BLOOD PRESSURE: 110 MMHG | WEIGHT: 171.88 LBS | DIASTOLIC BLOOD PRESSURE: 67 MMHG | BODY MASS INDEX: 26.05 KG/M2 | HEIGHT: 68 IN | HEART RATE: 61 BPM

## 2021-05-03 DIAGNOSIS — N40.1 BPH WITH URINARY OBSTRUCTION: ICD-10-CM

## 2021-05-03 DIAGNOSIS — D50.9 IRON DEFICIENCY ANEMIA, UNSPECIFIED IRON DEFICIENCY ANEMIA TYPE: Primary | ICD-10-CM

## 2021-05-03 DIAGNOSIS — N13.8 BPH WITH URINARY OBSTRUCTION: ICD-10-CM

## 2021-05-03 DIAGNOSIS — R97.20 ELEVATED PSA: ICD-10-CM

## 2021-05-03 DIAGNOSIS — D50.9 IRON DEFICIENCY ANEMIA, UNSPECIFIED IRON DEFICIENCY ANEMIA TYPE: ICD-10-CM

## 2021-05-03 DIAGNOSIS — R97.20 ELEVATED PSA: Primary | ICD-10-CM

## 2021-05-03 LAB
BILIRUB SERPL-MCNC: NORMAL MG/DL
BLOOD URINE, POC: NORMAL
CLARITY, POC UA: CLEAR
COLOR, POC UA: YELLOW
COMPLEXED PSA SERPL-MCNC: 9.9 NG/ML (ref 0–4)
GLUCOSE UR QL STRIP: NORMAL
IRON SERPL-MCNC: 12 UG/DL (ref 45–160)
KETONES UR QL STRIP: NORMAL
LEUKOCYTE ESTERASE URINE, POC: NORMAL
NITRITE, POC UA: NORMAL
PH, POC UA: 7
PROTEIN, POC: NORMAL
RETICS/RBC NFR AUTO: 1.1 % (ref 0.4–2)
SATURATED IRON: 3 % (ref 20–50)
SPECIFIC GRAVITY, POC UA: 1.01
TOTAL IRON BINDING CAPACITY: 450 UG/DL (ref 250–450)
TRANSFERRIN SERPL-MCNC: 304 MG/DL (ref 200–375)
UROBILINOGEN, POC UA: NORMAL

## 2021-05-03 PROCEDURE — 3008F BODY MASS INDEX DOCD: CPT | Mod: CPTII,S$GLB,, | Performed by: NURSE PRACTITIONER

## 2021-05-03 PROCEDURE — 3008F PR BODY MASS INDEX (BMI) DOCUMENTED: ICD-10-PCS | Mod: CPTII,S$GLB,, | Performed by: NURSE PRACTITIONER

## 2021-05-03 PROCEDURE — 1159F PR MEDICATION LIST DOCUMENTED IN MEDICAL RECORD: ICD-10-PCS | Mod: S$GLB,,, | Performed by: NURSE PRACTITIONER

## 2021-05-03 PROCEDURE — 1157F PR ADVANCE CARE PLAN OR EQUIV PRESENT IN MEDICAL RECORD: ICD-10-PCS | Mod: S$GLB,,, | Performed by: NURSE PRACTITIONER

## 2021-05-03 PROCEDURE — 85045 AUTOMATED RETICULOCYTE COUNT: CPT | Performed by: INTERNAL MEDICINE

## 2021-05-03 PROCEDURE — 81002 POCT URINE DIPSTICK WITHOUT MICROSCOPE: ICD-10-PCS | Mod: S$GLB,,, | Performed by: NURSE PRACTITIONER

## 2021-05-03 PROCEDURE — 83540 ASSAY OF IRON: CPT | Performed by: INTERNAL MEDICINE

## 2021-05-03 PROCEDURE — 99999 PR PBB SHADOW E&M-EST. PATIENT-LVL V: ICD-10-PCS | Mod: PBBFAC,,, | Performed by: NURSE PRACTITIONER

## 2021-05-03 PROCEDURE — 99214 OFFICE O/P EST MOD 30 MIN: CPT | Mod: 25,S$GLB,, | Performed by: NURSE PRACTITIONER

## 2021-05-03 PROCEDURE — 1159F MED LIST DOCD IN RCRD: CPT | Mod: S$GLB,,, | Performed by: NURSE PRACTITIONER

## 2021-05-03 PROCEDURE — 1101F PT FALLS ASSESS-DOCD LE1/YR: CPT | Mod: CPTII,S$GLB,, | Performed by: NURSE PRACTITIONER

## 2021-05-03 PROCEDURE — 99999 PR PBB SHADOW E&M-EST. PATIENT-LVL V: CPT | Mod: PBBFAC,,, | Performed by: NURSE PRACTITIONER

## 2021-05-03 PROCEDURE — 1126F AMNT PAIN NOTED NONE PRSNT: CPT | Mod: S$GLB,,, | Performed by: NURSE PRACTITIONER

## 2021-05-03 PROCEDURE — 3288F FALL RISK ASSESSMENT DOCD: CPT | Mod: CPTII,S$GLB,, | Performed by: NURSE PRACTITIONER

## 2021-05-03 PROCEDURE — 1101F PR PT FALLS ASSESS DOC 0-1 FALLS W/OUT INJ PAST YR: ICD-10-PCS | Mod: CPTII,S$GLB,, | Performed by: NURSE PRACTITIONER

## 2021-05-03 PROCEDURE — 3288F PR FALLS RISK ASSESSMENT DOCUMENTED: ICD-10-PCS | Mod: CPTII,S$GLB,, | Performed by: NURSE PRACTITIONER

## 2021-05-03 PROCEDURE — 1126F PR PAIN SEVERITY QUANTIFIED, NO PAIN PRESENT: ICD-10-PCS | Mod: S$GLB,,, | Performed by: NURSE PRACTITIONER

## 2021-05-03 PROCEDURE — 81002 URINALYSIS NONAUTO W/O SCOPE: CPT | Mod: S$GLB,,, | Performed by: NURSE PRACTITIONER

## 2021-05-03 PROCEDURE — 99214 PR OFFICE/OUTPT VISIT, EST, LEVL IV, 30-39 MIN: ICD-10-PCS | Mod: 25,S$GLB,, | Performed by: NURSE PRACTITIONER

## 2021-05-03 PROCEDURE — 1157F ADVNC CARE PLAN IN RCRD: CPT | Mod: S$GLB,,, | Performed by: NURSE PRACTITIONER

## 2021-05-03 PROCEDURE — 84153 ASSAY OF PSA TOTAL: CPT | Performed by: NURSE PRACTITIONER

## 2021-05-03 PROCEDURE — 36415 COLL VENOUS BLD VENIPUNCTURE: CPT | Performed by: NURSE PRACTITIONER

## 2021-05-04 ENCOUNTER — PATIENT MESSAGE (OUTPATIENT)
Dept: HEMATOLOGY/ONCOLOGY | Facility: CLINIC | Age: 71
End: 2021-05-04

## 2021-05-04 ENCOUNTER — PATIENT MESSAGE (OUTPATIENT)
Dept: UROLOGY | Facility: CLINIC | Age: 71
End: 2021-05-04

## 2021-05-04 DIAGNOSIS — D50.9 IRON DEFICIENCY ANEMIA, UNSPECIFIED IRON DEFICIENCY ANEMIA TYPE: Primary | ICD-10-CM

## 2021-05-04 DIAGNOSIS — R97.20 RISING PSA LEVEL: ICD-10-CM

## 2021-05-04 DIAGNOSIS — R97.20 ELEVATED PSA: Primary | ICD-10-CM

## 2021-05-04 RX ORDER — HEPARIN 100 UNIT/ML
500 SYRINGE INTRAVENOUS
Status: CANCELLED | OUTPATIENT
Start: 2021-05-14

## 2021-05-04 RX ORDER — SODIUM CHLORIDE 0.9 % (FLUSH) 0.9 %
10 SYRINGE (ML) INJECTION
Status: CANCELLED | OUTPATIENT
Start: 2021-05-04

## 2021-05-04 RX ORDER — SODIUM CHLORIDE 0.9 % (FLUSH) 0.9 %
10 SYRINGE (ML) INJECTION
Status: CANCELLED | OUTPATIENT
Start: 2021-05-14

## 2021-05-04 RX ORDER — HEPARIN 100 UNIT/ML
500 SYRINGE INTRAVENOUS
Status: CANCELLED | OUTPATIENT
Start: 2021-05-04

## 2021-05-05 ENCOUNTER — PATIENT MESSAGE (OUTPATIENT)
Dept: REHABILITATION | Facility: HOSPITAL | Age: 71
End: 2021-05-05

## 2021-05-05 ENCOUNTER — PATIENT MESSAGE (OUTPATIENT)
Dept: UROLOGY | Facility: CLINIC | Age: 71
End: 2021-05-05

## 2021-05-07 ENCOUNTER — CLINICAL SUPPORT (OUTPATIENT)
Dept: REHABILITATION | Facility: HOSPITAL | Age: 71
End: 2021-05-07
Attending: ORTHOPAEDIC SURGERY
Payer: COMMERCIAL

## 2021-05-07 DIAGNOSIS — M79.642 PAIN IN LEFT HAND: ICD-10-CM

## 2021-05-07 PROCEDURE — 97022 WHIRLPOOL THERAPY: CPT

## 2021-05-07 PROCEDURE — 97165 OT EVAL LOW COMPLEX 30 MIN: CPT

## 2021-05-07 PROCEDURE — 97110 THERAPEUTIC EXERCISES: CPT

## 2021-05-10 ENCOUNTER — PATIENT MESSAGE (OUTPATIENT)
Dept: GASTROENTEROLOGY | Facility: CLINIC | Age: 71
End: 2021-05-10

## 2021-05-10 ENCOUNTER — PATIENT MESSAGE (OUTPATIENT)
Dept: SURGERY | Facility: HOSPITAL | Age: 71
End: 2021-05-10

## 2021-05-10 ENCOUNTER — ANESTHESIA EVENT (OUTPATIENT)
Dept: SURGERY | Facility: HOSPITAL | Age: 71
End: 2021-05-10
Payer: COMMERCIAL

## 2021-05-11 ENCOUNTER — OFFICE VISIT (OUTPATIENT)
Dept: GASTROENTEROLOGY | Facility: CLINIC | Age: 71
End: 2021-05-11
Payer: COMMERCIAL

## 2021-05-11 DIAGNOSIS — K21.9 GASTROESOPHAGEAL REFLUX DISEASE, UNSPECIFIED WHETHER ESOPHAGITIS PRESENT: ICD-10-CM

## 2021-05-11 DIAGNOSIS — D50.0 IRON DEFICIENCY ANEMIA DUE TO CHRONIC BLOOD LOSS: Primary | ICD-10-CM

## 2021-05-11 DIAGNOSIS — Z86.010 HISTORY OF COLON POLYPS: ICD-10-CM

## 2021-05-11 PROCEDURE — 99204 PR OFFICE/OUTPT VISIT, NEW, LEVL IV, 45-59 MIN: ICD-10-PCS | Mod: 95,,, | Performed by: INTERNAL MEDICINE

## 2021-05-11 PROCEDURE — 1159F PR MEDICATION LIST DOCUMENTED IN MEDICAL RECORD: ICD-10-PCS | Mod: ,,, | Performed by: INTERNAL MEDICINE

## 2021-05-11 PROCEDURE — 1157F ADVNC CARE PLAN IN RCRD: CPT | Mod: ,,, | Performed by: INTERNAL MEDICINE

## 2021-05-11 PROCEDURE — 1157F PR ADVANCE CARE PLAN OR EQUIV PRESENT IN MEDICAL RECORD: ICD-10-PCS | Mod: ,,, | Performed by: INTERNAL MEDICINE

## 2021-05-11 PROCEDURE — 1159F MED LIST DOCD IN RCRD: CPT | Mod: ,,, | Performed by: INTERNAL MEDICINE

## 2021-05-11 PROCEDURE — 99204 OFFICE O/P NEW MOD 45 MIN: CPT | Mod: 95,,, | Performed by: INTERNAL MEDICINE

## 2021-05-13 ENCOUNTER — INFUSION (OUTPATIENT)
Dept: INFUSION THERAPY | Facility: HOSPITAL | Age: 71
End: 2021-05-13
Payer: COMMERCIAL

## 2021-05-13 ENCOUNTER — CLINICAL SUPPORT (OUTPATIENT)
Dept: REHABILITATION | Facility: HOSPITAL | Age: 71
End: 2021-05-13
Attending: ORTHOPAEDIC SURGERY
Payer: COMMERCIAL

## 2021-05-13 VITALS
RESPIRATION RATE: 18 BRPM | DIASTOLIC BLOOD PRESSURE: 53 MMHG | HEART RATE: 66 BPM | TEMPERATURE: 98 F | SYSTOLIC BLOOD PRESSURE: 103 MMHG

## 2021-05-13 DIAGNOSIS — D50.9 IRON DEFICIENCY ANEMIA, UNSPECIFIED IRON DEFICIENCY ANEMIA TYPE: Primary | ICD-10-CM

## 2021-05-13 DIAGNOSIS — M79.642 PAIN IN LEFT HAND: Primary | ICD-10-CM

## 2021-05-13 PROCEDURE — 25000003 PHARM REV CODE 250: Performed by: NURSE PRACTITIONER

## 2021-05-13 PROCEDURE — 97018 PARAFFIN BATH THERAPY: CPT

## 2021-05-13 PROCEDURE — 96374 THER/PROPH/DIAG INJ IV PUSH: CPT

## 2021-05-13 PROCEDURE — 97140 MANUAL THERAPY 1/> REGIONS: CPT

## 2021-05-13 PROCEDURE — 97110 THERAPEUTIC EXERCISES: CPT

## 2021-05-13 PROCEDURE — 63600175 PHARM REV CODE 636 W HCPCS: Mod: JG | Performed by: NURSE PRACTITIONER

## 2021-05-13 RX ADMIN — SODIUM CHLORIDE: 0.9 INJECTION, SOLUTION INTRAVENOUS at 08:05

## 2021-05-13 RX ADMIN — FERRIC CARBOXYMALTOSE INJECTION 750 MG: 50 INJECTION, SOLUTION INTRAVENOUS at 08:05

## 2021-05-17 ENCOUNTER — TELEPHONE (OUTPATIENT)
Dept: ORTHOPEDICS | Facility: CLINIC | Age: 71
End: 2021-05-17

## 2021-05-17 DIAGNOSIS — G56.01 RIGHT CARPAL TUNNEL SYNDROME: ICD-10-CM

## 2021-05-17 DIAGNOSIS — Z01.818 PRE-OP TESTING: Primary | ICD-10-CM

## 2021-05-17 RX ORDER — MUPIROCIN 20 MG/G
OINTMENT TOPICAL
Status: CANCELLED | OUTPATIENT
Start: 2021-05-17

## 2021-05-17 RX ORDER — TRAMADOL HYDROCHLORIDE 50 MG/1
50 TABLET ORAL EVERY 6 HOURS PRN
Qty: 10 TABLET | Refills: 0 | Status: SHIPPED | OUTPATIENT
Start: 2021-05-17 | End: 2021-06-09

## 2021-05-18 ENCOUNTER — HOSPITAL ENCOUNTER (OUTPATIENT)
Facility: HOSPITAL | Age: 71
Discharge: HOME | End: 2021-05-18
Attending: ORTHOPAEDIC SURGERY | Admitting: ORTHOPAEDIC SURGERY
Payer: COMMERCIAL

## 2021-05-18 ENCOUNTER — ANESTHESIA (OUTPATIENT)
Dept: SURGERY | Facility: HOSPITAL | Age: 71
End: 2021-05-18
Payer: COMMERCIAL

## 2021-05-18 VITALS
DIASTOLIC BLOOD PRESSURE: 74 MMHG | TEMPERATURE: 98 F | OXYGEN SATURATION: 98 % | HEIGHT: 69 IN | HEART RATE: 49 BPM | RESPIRATION RATE: 20 BRPM | BODY MASS INDEX: 25.92 KG/M2 | SYSTOLIC BLOOD PRESSURE: 122 MMHG | WEIGHT: 175 LBS

## 2021-05-18 DIAGNOSIS — G56.01 RIGHT CARPAL TUNNEL SYNDROME: Primary | ICD-10-CM

## 2021-05-18 PROCEDURE — 25000003 PHARM REV CODE 250: Performed by: ORTHOPAEDIC SURGERY

## 2021-05-18 PROCEDURE — 36000706: Performed by: ORTHOPAEDIC SURGERY

## 2021-05-18 PROCEDURE — 37000008 HC ANESTHESIA 1ST 15 MINUTES: Performed by: ORTHOPAEDIC SURGERY

## 2021-05-18 PROCEDURE — D9220A PRA ANESTHESIA: ICD-10-PCS | Mod: ANES,,, | Performed by: ANESTHESIOLOGY

## 2021-05-18 PROCEDURE — 64721 CARPAL TUNNEL SURGERY: CPT | Mod: RT,,, | Performed by: ORTHOPAEDIC SURGERY

## 2021-05-18 PROCEDURE — 37000009 HC ANESTHESIA EA ADD 15 MINS: Performed by: ORTHOPAEDIC SURGERY

## 2021-05-18 PROCEDURE — 71000033 HC RECOVERY, INTIAL HOUR: Performed by: ORTHOPAEDIC SURGERY

## 2021-05-18 PROCEDURE — D9220A PRA ANESTHESIA: Mod: CRNA,,, | Performed by: NURSE ANESTHETIST, CERTIFIED REGISTERED

## 2021-05-18 PROCEDURE — 36000707: Performed by: ORTHOPAEDIC SURGERY

## 2021-05-18 PROCEDURE — 94761 N-INVAS EAR/PLS OXIMETRY MLT: CPT

## 2021-05-18 PROCEDURE — 63600175 PHARM REV CODE 636 W HCPCS: Performed by: NURSE ANESTHETIST, CERTIFIED REGISTERED

## 2021-05-18 PROCEDURE — D9220A PRA ANESTHESIA: ICD-10-PCS | Mod: CRNA,,, | Performed by: NURSE ANESTHETIST, CERTIFIED REGISTERED

## 2021-05-18 PROCEDURE — 63600175 PHARM REV CODE 636 W HCPCS: Performed by: STUDENT IN AN ORGANIZED HEALTH CARE EDUCATION/TRAINING PROGRAM

## 2021-05-18 PROCEDURE — 64721 PR REVISE MEDIAN N/CARPAL TUNNEL SURG: ICD-10-PCS | Mod: RT,,, | Performed by: ORTHOPAEDIC SURGERY

## 2021-05-18 PROCEDURE — 99900035 HC TECH TIME PER 15 MIN (STAT)

## 2021-05-18 PROCEDURE — 25000003 PHARM REV CODE 250: Performed by: NURSE ANESTHETIST, CERTIFIED REGISTERED

## 2021-05-18 PROCEDURE — 71000015 HC POSTOP RECOV 1ST HR: Performed by: ORTHOPAEDIC SURGERY

## 2021-05-18 PROCEDURE — 25000003 PHARM REV CODE 250: Performed by: STUDENT IN AN ORGANIZED HEALTH CARE EDUCATION/TRAINING PROGRAM

## 2021-05-18 PROCEDURE — 27201423 OPTIME MED/SURG SUP & DEVICES STERILE SUPPLY: Performed by: ORTHOPAEDIC SURGERY

## 2021-05-18 PROCEDURE — D9220A PRA ANESTHESIA: Mod: ANES,,, | Performed by: ANESTHESIOLOGY

## 2021-05-18 RX ORDER — CELECOXIB 200 MG/1
400 CAPSULE ORAL
Status: COMPLETED | OUTPATIENT
Start: 2021-05-18 | End: 2021-05-18

## 2021-05-18 RX ORDER — BACITRACIN ZINC 500 UNIT/G
OINTMENT (GRAM) TOPICAL
Status: DISCONTINUED | OUTPATIENT
Start: 2021-05-18 | End: 2021-05-18 | Stop reason: HOSPADM

## 2021-05-18 RX ORDER — FENTANYL CITRATE 50 UG/ML
INJECTION, SOLUTION INTRAMUSCULAR; INTRAVENOUS
Status: DISCONTINUED | OUTPATIENT
Start: 2021-05-18 | End: 2021-05-18

## 2021-05-18 RX ORDER — PROPOFOL 10 MG/ML
VIAL (ML) INTRAVENOUS
Status: DISCONTINUED | OUTPATIENT
Start: 2021-05-18 | End: 2021-05-18

## 2021-05-18 RX ORDER — LIDOCAINE HYDROCHLORIDE 10 MG/ML
INJECTION, SOLUTION EPIDURAL; INFILTRATION; INTRACAUDAL; PERINEURAL
Status: DISCONTINUED | OUTPATIENT
Start: 2021-05-18 | End: 2021-05-18 | Stop reason: HOSPADM

## 2021-05-18 RX ORDER — CEFAZOLIN SODIUM 1 G/3ML
2 INJECTION, POWDER, FOR SOLUTION INTRAMUSCULAR; INTRAVENOUS
Status: COMPLETED | OUTPATIENT
Start: 2021-05-18 | End: 2021-05-18

## 2021-05-18 RX ORDER — PROPOFOL 10 MG/ML
VIAL (ML) INTRAVENOUS CONTINUOUS PRN
Status: DISCONTINUED | OUTPATIENT
Start: 2021-05-18 | End: 2021-05-18

## 2021-05-18 RX ORDER — BUPIVACAINE HYDROCHLORIDE 2.5 MG/ML
INJECTION, SOLUTION EPIDURAL; INFILTRATION; INTRACAUDAL
Status: DISCONTINUED | OUTPATIENT
Start: 2021-05-18 | End: 2021-05-18 | Stop reason: HOSPADM

## 2021-05-18 RX ORDER — SODIUM CHLORIDE 0.9 % (FLUSH) 0.9 %
10 SYRINGE (ML) INJECTION
Status: DISCONTINUED | OUTPATIENT
Start: 2021-05-18 | End: 2021-05-18 | Stop reason: HOSPADM

## 2021-05-18 RX ORDER — FAMOTIDINE 10 MG/ML
INJECTION INTRAVENOUS
Status: DISCONTINUED | OUTPATIENT
Start: 2021-05-18 | End: 2021-05-18

## 2021-05-18 RX ORDER — MUPIROCIN 20 MG/G
OINTMENT TOPICAL
Status: DISCONTINUED | OUTPATIENT
Start: 2021-05-18 | End: 2021-05-18 | Stop reason: HOSPADM

## 2021-05-18 RX ORDER — FENTANYL CITRATE 50 UG/ML
25 INJECTION, SOLUTION INTRAMUSCULAR; INTRAVENOUS EVERY 5 MIN PRN
Status: DISCONTINUED | OUTPATIENT
Start: 2021-05-18 | End: 2021-05-18 | Stop reason: HOSPADM

## 2021-05-18 RX ORDER — ACETAMINOPHEN 500 MG
1000 TABLET ORAL
Status: COMPLETED | OUTPATIENT
Start: 2021-05-18 | End: 2021-05-18

## 2021-05-18 RX ORDER — ONDANSETRON 2 MG/ML
INJECTION INTRAMUSCULAR; INTRAVENOUS
Status: DISCONTINUED | OUTPATIENT
Start: 2021-05-18 | End: 2021-05-18

## 2021-05-18 RX ORDER — OXYCODONE HYDROCHLORIDE 5 MG/1
5 TABLET ORAL
Status: DISCONTINUED | OUTPATIENT
Start: 2021-05-18 | End: 2021-05-18 | Stop reason: HOSPADM

## 2021-05-18 RX ORDER — LIDOCAINE HYDROCHLORIDE 10 MG/ML
INJECTION, SOLUTION INTRAVENOUS
Status: DISCONTINUED | OUTPATIENT
Start: 2021-05-18 | End: 2021-05-18

## 2021-05-18 RX ORDER — HYDROCODONE BITARTRATE AND ACETAMINOPHEN 5; 325 MG/1; MG/1
1 TABLET ORAL EVERY 4 HOURS PRN
Status: DISCONTINUED | OUTPATIENT
Start: 2021-05-18 | End: 2021-05-18 | Stop reason: HOSPADM

## 2021-05-18 RX ORDER — MUPIROCIN 20 MG/G
OINTMENT TOPICAL 2 TIMES DAILY
Status: DISCONTINUED | OUTPATIENT
Start: 2021-05-18 | End: 2021-05-18 | Stop reason: HOSPADM

## 2021-05-18 RX ADMIN — CEFAZOLIN 2 G: 330 INJECTION, POWDER, FOR SOLUTION INTRAMUSCULAR; INTRAVENOUS at 09:05

## 2021-05-18 RX ADMIN — ACETAMINOPHEN 1000 MG: 500 TABLET, FILM COATED ORAL at 08:05

## 2021-05-18 RX ADMIN — FENTANYL CITRATE 25 MCG: 50 INJECTION, SOLUTION INTRAMUSCULAR; INTRAVENOUS at 09:05

## 2021-05-18 RX ADMIN — PROPOFOL 75 MCG/KG/MIN: 10 INJECTION, EMULSION INTRAVENOUS at 09:05

## 2021-05-18 RX ADMIN — CELECOXIB 400 MG: 200 CAPSULE ORAL at 08:05

## 2021-05-18 RX ADMIN — LIDOCAINE HYDROCHLORIDE 60 MG: 10 INJECTION, SOLUTION INTRAVENOUS at 09:05

## 2021-05-18 RX ADMIN — ONDANSETRON 4 MG: 2 INJECTION, SOLUTION INTRAMUSCULAR; INTRAVENOUS at 09:05

## 2021-05-18 RX ADMIN — SODIUM CHLORIDE: 9 INJECTION, SOLUTION INTRAVENOUS at 08:05

## 2021-05-18 RX ADMIN — PROPOFOL 20 MG: 10 INJECTION, EMULSION INTRAVENOUS at 09:05

## 2021-05-18 RX ADMIN — FAMOTIDINE 20 MG: 10 INJECTION, SOLUTION INTRAVENOUS at 09:05

## 2021-05-18 RX ADMIN — MUPIROCIN: 20 OINTMENT TOPICAL at 08:05

## 2021-05-20 ENCOUNTER — CLINICAL SUPPORT (OUTPATIENT)
Dept: REHABILITATION | Facility: HOSPITAL | Age: 71
End: 2021-05-20
Payer: COMMERCIAL

## 2021-05-20 ENCOUNTER — INFUSION (OUTPATIENT)
Dept: INFUSION THERAPY | Facility: HOSPITAL | Age: 71
End: 2021-05-20
Payer: COMMERCIAL

## 2021-05-20 VITALS
SYSTOLIC BLOOD PRESSURE: 110 MMHG | RESPIRATION RATE: 18 BRPM | OXYGEN SATURATION: 95 % | HEART RATE: 63 BPM | TEMPERATURE: 98 F | DIASTOLIC BLOOD PRESSURE: 57 MMHG

## 2021-05-20 DIAGNOSIS — D50.9 IRON DEFICIENCY ANEMIA, UNSPECIFIED IRON DEFICIENCY ANEMIA TYPE: Primary | ICD-10-CM

## 2021-05-20 DIAGNOSIS — M79.642 PAIN IN LEFT HAND: Primary | ICD-10-CM

## 2021-05-20 PROCEDURE — 97022 WHIRLPOOL THERAPY: CPT

## 2021-05-20 PROCEDURE — 63600175 PHARM REV CODE 636 W HCPCS: Mod: JG

## 2021-05-20 PROCEDURE — 97140 MANUAL THERAPY 1/> REGIONS: CPT

## 2021-05-20 PROCEDURE — 63600175 PHARM REV CODE 636 W HCPCS: Mod: JG | Performed by: NURSE PRACTITIONER

## 2021-05-20 PROCEDURE — 97110 THERAPEUTIC EXERCISES: CPT

## 2021-05-20 PROCEDURE — 96374 THER/PROPH/DIAG INJ IV PUSH: CPT

## 2021-05-20 PROCEDURE — 25000003 PHARM REV CODE 250: Performed by: NURSE PRACTITIONER

## 2021-05-20 PROCEDURE — 25000003 PHARM REV CODE 250

## 2021-05-20 RX ORDER — HEPARIN 100 UNIT/ML
500 SYRINGE INTRAVENOUS
Status: DISCONTINUED | OUTPATIENT
Start: 2021-05-20 | End: 2021-05-20 | Stop reason: HOSPADM

## 2021-05-20 RX ORDER — SODIUM CHLORIDE 0.9 % (FLUSH) 0.9 %
10 SYRINGE (ML) INJECTION
Status: DISCONTINUED | OUTPATIENT
Start: 2021-05-20 | End: 2021-05-20 | Stop reason: HOSPADM

## 2021-05-20 RX ADMIN — FERRIC CARBOXYMALTOSE INJECTION 750 MG: 50 INJECTION, SOLUTION INTRAVENOUS at 08:05

## 2021-05-20 RX ADMIN — SODIUM CHLORIDE: 0.9 INJECTION, SOLUTION INTRAVENOUS at 08:05

## 2021-05-21 ENCOUNTER — PATIENT MESSAGE (OUTPATIENT)
Dept: REHABILITATION | Facility: HOSPITAL | Age: 71
End: 2021-05-21

## 2021-05-25 ENCOUNTER — PATIENT MESSAGE (OUTPATIENT)
Dept: UROLOGY | Facility: CLINIC | Age: 71
End: 2021-05-25

## 2021-05-25 ENCOUNTER — HOSPITAL ENCOUNTER (OUTPATIENT)
Dept: RADIOLOGY | Facility: HOSPITAL | Age: 71
Discharge: HOME | End: 2021-05-25
Attending: NURSE PRACTITIONER
Payer: COMMERCIAL

## 2021-05-25 DIAGNOSIS — R97.20 ELEVATED PSA: ICD-10-CM

## 2021-05-25 DIAGNOSIS — R97.20 RISING PSA LEVEL: ICD-10-CM

## 2021-05-25 PROCEDURE — 72197 MRI PELVIS W/O & W/DYE: CPT | Mod: TC

## 2021-05-25 PROCEDURE — A9585 GADOBUTROL INJECTION: HCPCS | Performed by: NURSE PRACTITIONER

## 2021-05-25 PROCEDURE — 72197 MRI PELVIS W/O & W/DYE: CPT | Mod: 26,,, | Performed by: RADIOLOGY

## 2021-05-25 PROCEDURE — 25500020 PHARM REV CODE 255: Performed by: NURSE PRACTITIONER

## 2021-05-25 PROCEDURE — 72197 MRI PROSTATE W W/O CONTRAST: ICD-10-PCS | Mod: 26,,, | Performed by: RADIOLOGY

## 2021-05-25 RX ORDER — GADOBUTROL 604.72 MG/ML
9 INJECTION INTRAVENOUS
Status: COMPLETED | OUTPATIENT
Start: 2021-05-25 | End: 2021-05-25

## 2021-05-25 RX ADMIN — GADOBUTROL 9 ML: 604.72 INJECTION INTRAVENOUS at 05:05

## 2021-05-26 ENCOUNTER — PATIENT MESSAGE (OUTPATIENT)
Dept: UROLOGY | Facility: CLINIC | Age: 71
End: 2021-05-26

## 2021-05-26 DIAGNOSIS — R97.20 ELEVATED PSA: Primary | ICD-10-CM

## 2021-05-26 DIAGNOSIS — R97.20 RISING PSA LEVEL: ICD-10-CM

## 2021-05-26 DIAGNOSIS — R93.89 ABNORMAL MRI: ICD-10-CM

## 2021-05-26 RX ORDER — LIDOCAINE HYDROCHLORIDE 20 MG/ML
JELLY TOPICAL ONCE
Status: CANCELLED | OUTPATIENT
Start: 2021-05-26 | End: 2021-05-26

## 2021-05-26 RX ORDER — LIDOCAINE HYDROCHLORIDE 10 MG/ML
10 INJECTION INFILTRATION; PERINEURAL ONCE
Status: CANCELLED | OUTPATIENT
Start: 2021-05-26 | End: 2021-05-26

## 2021-05-26 RX ORDER — CIPROFLOXACIN 500 MG/1
TABLET ORAL
Qty: 4 TABLET | Refills: 0 | Status: SHIPPED | OUTPATIENT
Start: 2021-05-26 | End: 2021-09-09

## 2021-05-27 ENCOUNTER — CLINICAL SUPPORT (OUTPATIENT)
Dept: REHABILITATION | Facility: HOSPITAL | Age: 71
End: 2021-05-27
Payer: COMMERCIAL

## 2021-05-27 DIAGNOSIS — M79.642 PAIN IN LEFT HAND: Primary | ICD-10-CM

## 2021-05-27 PROCEDURE — 97110 THERAPEUTIC EXERCISES: CPT

## 2021-05-27 PROCEDURE — 97140 MANUAL THERAPY 1/> REGIONS: CPT

## 2021-05-28 ENCOUNTER — PATIENT MESSAGE (OUTPATIENT)
Dept: UROLOGY | Facility: CLINIC | Age: 71
End: 2021-05-28

## 2021-05-28 ENCOUNTER — OFFICE VISIT (OUTPATIENT)
Dept: ORTHOPEDICS | Facility: CLINIC | Age: 71
End: 2021-05-28
Payer: COMMERCIAL

## 2021-05-28 DIAGNOSIS — G56.01 RIGHT CARPAL TUNNEL SYNDROME: Primary | ICD-10-CM

## 2021-05-28 DIAGNOSIS — M54.12 CERVICAL RADICULOPATHY: ICD-10-CM

## 2021-05-28 PROCEDURE — 99999 PR PBB SHADOW E&M-EST. PATIENT-LVL II: CPT | Mod: PBBFAC,,, | Performed by: ORTHOPAEDIC SURGERY

## 2021-05-28 PROCEDURE — 1157F ADVNC CARE PLAN IN RCRD: CPT | Mod: S$GLB,,, | Performed by: ORTHOPAEDIC SURGERY

## 2021-05-28 PROCEDURE — 99999 PR PBB SHADOW E&M-EST. PATIENT-LVL II: ICD-10-PCS | Mod: PBBFAC,,, | Performed by: ORTHOPAEDIC SURGERY

## 2021-05-28 PROCEDURE — 1157F PR ADVANCE CARE PLAN OR EQUIV PRESENT IN MEDICAL RECORD: ICD-10-PCS | Mod: S$GLB,,, | Performed by: ORTHOPAEDIC SURGERY

## 2021-05-28 PROCEDURE — 1125F PR PAIN SEVERITY QUANTIFIED, PAIN PRESENT: ICD-10-PCS | Mod: S$GLB,,, | Performed by: ORTHOPAEDIC SURGERY

## 2021-05-28 PROCEDURE — 99024 POSTOP FOLLOW-UP VISIT: CPT | Mod: S$GLB,,, | Performed by: ORTHOPAEDIC SURGERY

## 2021-05-28 PROCEDURE — 99024 PR POST-OP FOLLOW-UP VISIT: ICD-10-PCS | Mod: S$GLB,,, | Performed by: ORTHOPAEDIC SURGERY

## 2021-05-28 PROCEDURE — 1125F AMNT PAIN NOTED PAIN PRSNT: CPT | Mod: S$GLB,,, | Performed by: ORTHOPAEDIC SURGERY

## 2021-05-31 ENCOUNTER — PATIENT MESSAGE (OUTPATIENT)
Dept: UROLOGY | Facility: CLINIC | Age: 71
End: 2021-05-31

## 2021-06-03 ENCOUNTER — CLINICAL SUPPORT (OUTPATIENT)
Dept: REHABILITATION | Facility: HOSPITAL | Age: 71
End: 2021-06-03
Payer: COMMERCIAL

## 2021-06-03 ENCOUNTER — CLINICAL SUPPORT (OUTPATIENT)
Dept: AUDIOLOGY | Facility: CLINIC | Age: 71
End: 2021-06-03
Payer: COMMERCIAL

## 2021-06-03 ENCOUNTER — OFFICE VISIT (OUTPATIENT)
Dept: OTOLARYNGOLOGY | Facility: CLINIC | Age: 71
End: 2021-06-03
Payer: COMMERCIAL

## 2021-06-03 VITALS — SYSTOLIC BLOOD PRESSURE: 103 MMHG | HEART RATE: 56 BPM | DIASTOLIC BLOOD PRESSURE: 67 MMHG

## 2021-06-03 DIAGNOSIS — H61.23 BILATERAL IMPACTED CERUMEN: ICD-10-CM

## 2021-06-03 DIAGNOSIS — H93.11 TINNITUS, RIGHT EAR: Primary | ICD-10-CM

## 2021-06-03 DIAGNOSIS — M79.642 PAIN IN LEFT HAND: ICD-10-CM

## 2021-06-03 DIAGNOSIS — H90.3 SENSORINEURAL HEARING LOSS, BILATERAL: Primary | ICD-10-CM

## 2021-06-03 PROCEDURE — 1159F PR MEDICATION LIST DOCUMENTED IN MEDICAL RECORD: ICD-10-PCS | Mod: S$GLB,,, | Performed by: OTOLARYNGOLOGY

## 2021-06-03 PROCEDURE — 1101F PT FALLS ASSESS-DOCD LE1/YR: CPT | Mod: CPTII,S$GLB,, | Performed by: OTOLARYNGOLOGY

## 2021-06-03 PROCEDURE — 1101F PR PT FALLS ASSESS DOC 0-1 FALLS W/OUT INJ PAST YR: ICD-10-PCS | Mod: CPTII,S$GLB,, | Performed by: OTOLARYNGOLOGY

## 2021-06-03 PROCEDURE — 69210 REMOVE IMPACTED EAR WAX UNI: CPT | Mod: S$GLB,,, | Performed by: OTOLARYNGOLOGY

## 2021-06-03 PROCEDURE — 97110 THERAPEUTIC EXERCISES: CPT

## 2021-06-03 PROCEDURE — 99213 OFFICE O/P EST LOW 20 MIN: CPT | Mod: 25,S$GLB,, | Performed by: OTOLARYNGOLOGY

## 2021-06-03 PROCEDURE — 99213 PR OFFICE/OUTPT VISIT, EST, LEVL III, 20-29 MIN: ICD-10-PCS | Mod: 25,S$GLB,, | Performed by: OTOLARYNGOLOGY

## 2021-06-03 PROCEDURE — 69210 PR REMOVAL IMPACTED CERUMEN REQUIRING INSTRUMENTATION, UNILATERAL: ICD-10-PCS | Mod: S$GLB,,, | Performed by: OTOLARYNGOLOGY

## 2021-06-03 PROCEDURE — 92567 PR TYMPA2METRY: ICD-10-PCS | Mod: S$GLB,,, | Performed by: AUDIOLOGIST

## 2021-06-03 PROCEDURE — 1157F ADVNC CARE PLAN IN RCRD: CPT | Mod: S$GLB,,, | Performed by: OTOLARYNGOLOGY

## 2021-06-03 PROCEDURE — 1126F PR PAIN SEVERITY QUANTIFIED, NO PAIN PRESENT: ICD-10-PCS | Mod: S$GLB,,, | Performed by: OTOLARYNGOLOGY

## 2021-06-03 PROCEDURE — 1126F AMNT PAIN NOTED NONE PRSNT: CPT | Mod: S$GLB,,, | Performed by: OTOLARYNGOLOGY

## 2021-06-03 PROCEDURE — 1159F MED LIST DOCD IN RCRD: CPT | Mod: S$GLB,,, | Performed by: OTOLARYNGOLOGY

## 2021-06-03 PROCEDURE — 92567 TYMPANOMETRY: CPT | Mod: S$GLB,,, | Performed by: AUDIOLOGIST

## 2021-06-03 PROCEDURE — 97140 MANUAL THERAPY 1/> REGIONS: CPT

## 2021-06-03 PROCEDURE — 3288F PR FALLS RISK ASSESSMENT DOCUMENTED: ICD-10-PCS | Mod: CPTII,S$GLB,, | Performed by: OTOLARYNGOLOGY

## 2021-06-03 PROCEDURE — 1157F PR ADVANCE CARE PLAN OR EQUIV PRESENT IN MEDICAL RECORD: ICD-10-PCS | Mod: S$GLB,,, | Performed by: OTOLARYNGOLOGY

## 2021-06-03 PROCEDURE — 99999 PR PBB SHADOW E&M-EST. PATIENT-LVL III: CPT | Mod: PBBFAC,,, | Performed by: OTOLARYNGOLOGY

## 2021-06-03 PROCEDURE — 99999 PR PBB SHADOW E&M-EST. PATIENT-LVL III: ICD-10-PCS | Mod: PBBFAC,,, | Performed by: OTOLARYNGOLOGY

## 2021-06-03 PROCEDURE — 3288F FALL RISK ASSESSMENT DOCD: CPT | Mod: CPTII,S$GLB,, | Performed by: OTOLARYNGOLOGY

## 2021-06-03 PROCEDURE — 92557 PR COMPREHENSIVE HEARING TEST: ICD-10-PCS | Mod: S$GLB,,, | Performed by: AUDIOLOGIST

## 2021-06-03 PROCEDURE — 92557 COMPREHENSIVE HEARING TEST: CPT | Mod: S$GLB,,, | Performed by: AUDIOLOGIST

## 2021-06-06 ENCOUNTER — PATIENT MESSAGE (OUTPATIENT)
Dept: OPTOMETRY | Facility: CLINIC | Age: 71
End: 2021-06-06

## 2021-06-07 ENCOUNTER — PATIENT MESSAGE (OUTPATIENT)
Dept: REHABILITATION | Facility: HOSPITAL | Age: 71
End: 2021-06-07

## 2021-06-09 ENCOUNTER — TELEPHONE (OUTPATIENT)
Dept: ORTHOPEDICS | Facility: CLINIC | Age: 71
End: 2021-06-09

## 2021-06-09 ENCOUNTER — OFFICE VISIT (OUTPATIENT)
Dept: OPTOMETRY | Facility: CLINIC | Age: 71
End: 2021-06-09
Payer: COMMERCIAL

## 2021-06-09 ENCOUNTER — OFFICE VISIT (OUTPATIENT)
Dept: NEUROLOGY | Facility: CLINIC | Age: 71
End: 2021-06-09
Payer: COMMERCIAL

## 2021-06-09 ENCOUNTER — PATIENT MESSAGE (OUTPATIENT)
Dept: ORTHOPEDICS | Facility: CLINIC | Age: 71
End: 2021-06-09

## 2021-06-09 ENCOUNTER — PATIENT MESSAGE (OUTPATIENT)
Dept: REHABILITATION | Facility: HOSPITAL | Age: 71
End: 2021-06-09

## 2021-06-09 VITALS
BODY MASS INDEX: 25.48 KG/M2 | HEIGHT: 69 IN | SYSTOLIC BLOOD PRESSURE: 120 MMHG | DIASTOLIC BLOOD PRESSURE: 72 MMHG | WEIGHT: 172 LBS | HEART RATE: 60 BPM

## 2021-06-09 DIAGNOSIS — H02.402 PTOSIS OF LEFT EYELID: ICD-10-CM

## 2021-06-09 DIAGNOSIS — M21.371 FOOT DROP, BILATERAL: ICD-10-CM

## 2021-06-09 DIAGNOSIS — F32.A DEPRESSION, UNSPECIFIED DEPRESSION TYPE: ICD-10-CM

## 2021-06-09 DIAGNOSIS — H02.834 DERMATOCHALASIS OF BOTH UPPER EYELIDS: ICD-10-CM

## 2021-06-09 DIAGNOSIS — H02.831 DERMATOCHALASIS OF BOTH UPPER EYELIDS: ICD-10-CM

## 2021-06-09 DIAGNOSIS — M21.372 FOOT DROP, BILATERAL: ICD-10-CM

## 2021-06-09 DIAGNOSIS — H43.811 PVD (POSTERIOR VITREOUS DETACHMENT), RIGHT EYE: Primary | ICD-10-CM

## 2021-06-09 DIAGNOSIS — G60.3 IDIOPATHIC PROGRESSIVE POLYNEUROPATHY: ICD-10-CM

## 2021-06-09 DIAGNOSIS — G43.009 MIGRAINE WITHOUT AURA AND WITHOUT STATUS MIGRAINOSUS, NOT INTRACTABLE: Primary | ICD-10-CM

## 2021-06-09 PROCEDURE — 3008F BODY MASS INDEX DOCD: CPT | Mod: CPTII,S$GLB,, | Performed by: PSYCHIATRY & NEUROLOGY

## 2021-06-09 PROCEDURE — 1126F PR PAIN SEVERITY QUANTIFIED, NO PAIN PRESENT: ICD-10-PCS | Mod: S$GLB,,, | Performed by: OPTOMETRIST

## 2021-06-09 PROCEDURE — 99999 PR PBB SHADOW E&M-EST. PATIENT-LVL III: CPT | Mod: PBBFAC,,, | Performed by: PSYCHIATRY & NEUROLOGY

## 2021-06-09 PROCEDURE — 92014 PR EYE EXAM, EST PATIENT,COMPREHESV: ICD-10-PCS | Mod: S$GLB,,, | Performed by: OPTOMETRIST

## 2021-06-09 PROCEDURE — 1101F PT FALLS ASSESS-DOCD LE1/YR: CPT | Mod: CPTII,S$GLB,, | Performed by: PSYCHIATRY & NEUROLOGY

## 2021-06-09 PROCEDURE — 1157F ADVNC CARE PLAN IN RCRD: CPT | Mod: S$GLB,,, | Performed by: PSYCHIATRY & NEUROLOGY

## 2021-06-09 PROCEDURE — 1101F PR PT FALLS ASSESS DOC 0-1 FALLS W/OUT INJ PAST YR: ICD-10-PCS | Mod: CPTII,S$GLB,, | Performed by: PSYCHIATRY & NEUROLOGY

## 2021-06-09 PROCEDURE — 1101F PR PT FALLS ASSESS DOC 0-1 FALLS W/OUT INJ PAST YR: ICD-10-PCS | Mod: CPTII,S$GLB,, | Performed by: OPTOMETRIST

## 2021-06-09 PROCEDURE — 1159F MED LIST DOCD IN RCRD: CPT | Mod: S$GLB,,, | Performed by: PSYCHIATRY & NEUROLOGY

## 2021-06-09 PROCEDURE — 1157F ADVNC CARE PLAN IN RCRD: CPT | Mod: S$GLB,,, | Performed by: OPTOMETRIST

## 2021-06-09 PROCEDURE — 1126F AMNT PAIN NOTED NONE PRSNT: CPT | Mod: S$GLB,,, | Performed by: OPTOMETRIST

## 2021-06-09 PROCEDURE — 1157F PR ADVANCE CARE PLAN OR EQUIV PRESENT IN MEDICAL RECORD: ICD-10-PCS | Mod: S$GLB,,, | Performed by: PSYCHIATRY & NEUROLOGY

## 2021-06-09 PROCEDURE — 1101F PT FALLS ASSESS-DOCD LE1/YR: CPT | Mod: CPTII,S$GLB,, | Performed by: OPTOMETRIST

## 2021-06-09 PROCEDURE — 3288F PR FALLS RISK ASSESSMENT DOCUMENTED: ICD-10-PCS | Mod: CPTII,S$GLB,, | Performed by: OPTOMETRIST

## 2021-06-09 PROCEDURE — 1125F PR PAIN SEVERITY QUANTIFIED, PAIN PRESENT: ICD-10-PCS | Mod: S$GLB,,, | Performed by: PSYCHIATRY & NEUROLOGY

## 2021-06-09 PROCEDURE — 3008F PR BODY MASS INDEX (BMI) DOCUMENTED: ICD-10-PCS | Mod: CPTII,S$GLB,, | Performed by: PSYCHIATRY & NEUROLOGY

## 2021-06-09 PROCEDURE — 1157F PR ADVANCE CARE PLAN OR EQUIV PRESENT IN MEDICAL RECORD: ICD-10-PCS | Mod: S$GLB,,, | Performed by: OPTOMETRIST

## 2021-06-09 PROCEDURE — 99999 PR PBB SHADOW E&M-EST. PATIENT-LVL III: ICD-10-PCS | Mod: PBBFAC,,, | Performed by: PSYCHIATRY & NEUROLOGY

## 2021-06-09 PROCEDURE — 3288F FALL RISK ASSESSMENT DOCD: CPT | Mod: CPTII,S$GLB,, | Performed by: OPTOMETRIST

## 2021-06-09 PROCEDURE — 3288F FALL RISK ASSESSMENT DOCD: CPT | Mod: CPTII,S$GLB,, | Performed by: PSYCHIATRY & NEUROLOGY

## 2021-06-09 PROCEDURE — 99215 OFFICE O/P EST HI 40 MIN: CPT | Mod: S$GLB,,, | Performed by: PSYCHIATRY & NEUROLOGY

## 2021-06-09 PROCEDURE — 1125F AMNT PAIN NOTED PAIN PRSNT: CPT | Mod: S$GLB,,, | Performed by: PSYCHIATRY & NEUROLOGY

## 2021-06-09 PROCEDURE — 99215 PR OFFICE/OUTPT VISIT, EST, LEVL V, 40-54 MIN: ICD-10-PCS | Mod: S$GLB,,, | Performed by: PSYCHIATRY & NEUROLOGY

## 2021-06-09 PROCEDURE — 99999 PR PBB SHADOW E&M-EST. PATIENT-LVL II: CPT | Mod: PBBFAC,,, | Performed by: OPTOMETRIST

## 2021-06-09 PROCEDURE — 92014 COMPRE OPH EXAM EST PT 1/>: CPT | Mod: S$GLB,,, | Performed by: OPTOMETRIST

## 2021-06-09 PROCEDURE — 99999 PR PBB SHADOW E&M-EST. PATIENT-LVL II: ICD-10-PCS | Mod: PBBFAC,,, | Performed by: OPTOMETRIST

## 2021-06-09 PROCEDURE — 3288F PR FALLS RISK ASSESSMENT DOCUMENTED: ICD-10-PCS | Mod: CPTII,S$GLB,, | Performed by: PSYCHIATRY & NEUROLOGY

## 2021-06-09 PROCEDURE — 1159F PR MEDICATION LIST DOCUMENTED IN MEDICAL RECORD: ICD-10-PCS | Mod: S$GLB,,, | Performed by: PSYCHIATRY & NEUROLOGY

## 2021-06-09 RX ORDER — CELECOXIB 100 MG/1
100 CAPSULE ORAL 2 TIMES DAILY
COMMUNITY
Start: 2021-01-28 | End: 2021-09-09

## 2021-06-09 RX ORDER — HYDROCORTISONE ACETATE 25 MG/1
SUPPOSITORY RECTAL
COMMUNITY
Start: 2020-12-31 | End: 2022-03-16

## 2021-06-09 RX ORDER — KETOCONAZOLE 20 MG/G
1 CREAM TOPICAL DAILY PRN
COMMUNITY
Start: 2020-12-20 | End: 2022-03-16

## 2021-06-10 ENCOUNTER — OFFICE VISIT (OUTPATIENT)
Dept: ORTHOPEDICS | Facility: CLINIC | Age: 71
End: 2021-06-10
Payer: COMMERCIAL

## 2021-06-10 ENCOUNTER — HOSPITAL ENCOUNTER (OUTPATIENT)
Dept: RADIOLOGY | Facility: HOSPITAL | Age: 71
Discharge: HOME | End: 2021-06-10
Attending: ORTHOPAEDIC SURGERY
Payer: COMMERCIAL

## 2021-06-10 DIAGNOSIS — M25.521 RIGHT ELBOW PAIN: ICD-10-CM

## 2021-06-10 DIAGNOSIS — M25.531 RIGHT WRIST PAIN: ICD-10-CM

## 2021-06-10 DIAGNOSIS — G56.01 RIGHT CARPAL TUNNEL SYNDROME: Primary | ICD-10-CM

## 2021-06-10 DIAGNOSIS — M72.0 DUPUYTREN'S CONTRACTURE OF LEFT HAND: ICD-10-CM

## 2021-06-10 DIAGNOSIS — M25.521 RIGHT ELBOW PAIN: Primary | ICD-10-CM

## 2021-06-10 PROCEDURE — 73080 X-RAY EXAM OF ELBOW: CPT | Mod: TC,PO,RT

## 2021-06-10 PROCEDURE — 1157F ADVNC CARE PLAN IN RCRD: CPT | Mod: S$GLB,,, | Performed by: ORTHOPAEDIC SURGERY

## 2021-06-10 PROCEDURE — 99999 PR PBB SHADOW E&M-EST. PATIENT-LVL II: ICD-10-PCS | Mod: PBBFAC,,, | Performed by: ORTHOPAEDIC SURGERY

## 2021-06-10 PROCEDURE — 1100F PR PT FALLS ASSESS DOC 2+ FALLS/FALL W/INJURY/YR: ICD-10-PCS | Mod: CPTII,S$GLB,, | Performed by: ORTHOPAEDIC SURGERY

## 2021-06-10 PROCEDURE — 1157F PR ADVANCE CARE PLAN OR EQUIV PRESENT IN MEDICAL RECORD: ICD-10-PCS | Mod: S$GLB,,, | Performed by: ORTHOPAEDIC SURGERY

## 2021-06-10 PROCEDURE — 73110 X-RAY EXAM OF WRIST: CPT | Mod: 26,RT,, | Performed by: RADIOLOGY

## 2021-06-10 PROCEDURE — 99024 POSTOP FOLLOW-UP VISIT: CPT | Mod: S$GLB,,, | Performed by: ORTHOPAEDIC SURGERY

## 2021-06-10 PROCEDURE — 1125F AMNT PAIN NOTED PAIN PRSNT: CPT | Mod: S$GLB,,, | Performed by: ORTHOPAEDIC SURGERY

## 2021-06-10 PROCEDURE — 73080 X-RAY EXAM OF ELBOW: CPT | Mod: 26,RT,, | Performed by: RADIOLOGY

## 2021-06-10 PROCEDURE — 73110 X-RAY EXAM OF WRIST: CPT | Mod: TC,PO,RT

## 2021-06-10 PROCEDURE — 99024 PR POST-OP FOLLOW-UP VISIT: ICD-10-PCS | Mod: S$GLB,,, | Performed by: ORTHOPAEDIC SURGERY

## 2021-06-10 PROCEDURE — 1125F PR PAIN SEVERITY QUANTIFIED, PAIN PRESENT: ICD-10-PCS | Mod: S$GLB,,, | Performed by: ORTHOPAEDIC SURGERY

## 2021-06-10 PROCEDURE — 1100F PTFALLS ASSESS-DOCD GE2>/YR: CPT | Mod: CPTII,S$GLB,, | Performed by: ORTHOPAEDIC SURGERY

## 2021-06-10 PROCEDURE — 99999 PR PBB SHADOW E&M-EST. PATIENT-LVL II: CPT | Mod: PBBFAC,,, | Performed by: ORTHOPAEDIC SURGERY

## 2021-06-10 PROCEDURE — 3288F FALL RISK ASSESSMENT DOCD: CPT | Mod: CPTII,S$GLB,, | Performed by: ORTHOPAEDIC SURGERY

## 2021-06-10 PROCEDURE — 73080 XR ELBOW COMPLETE 3 VIEW RIGHT: ICD-10-PCS | Mod: 26,RT,, | Performed by: RADIOLOGY

## 2021-06-10 PROCEDURE — 73110 XR WRIST COMPLETE 3 VIEWS RIGHT: ICD-10-PCS | Mod: 26,RT,, | Performed by: RADIOLOGY

## 2021-06-10 PROCEDURE — 3288F PR FALLS RISK ASSESSMENT DOCUMENTED: ICD-10-PCS | Mod: CPTII,S$GLB,, | Performed by: ORTHOPAEDIC SURGERY

## 2021-06-11 ENCOUNTER — CLINICAL SUPPORT (OUTPATIENT)
Dept: REHABILITATION | Facility: HOSPITAL | Age: 71
End: 2021-06-11
Payer: COMMERCIAL

## 2021-06-11 DIAGNOSIS — M79.642 PAIN IN LEFT HAND: ICD-10-CM

## 2021-06-11 PROCEDURE — 97140 MANUAL THERAPY 1/> REGIONS: CPT

## 2021-06-11 PROCEDURE — 97018 PARAFFIN BATH THERAPY: CPT

## 2021-06-11 PROCEDURE — 97110 THERAPEUTIC EXERCISES: CPT

## 2021-06-14 ENCOUNTER — CLINICAL SUPPORT (OUTPATIENT)
Dept: AUDIOLOGY | Facility: CLINIC | Age: 71
End: 2021-06-14
Payer: COMMERCIAL

## 2021-06-14 DIAGNOSIS — H93.11 TINNITUS OF RIGHT EAR: Primary | ICD-10-CM

## 2021-06-14 PROCEDURE — 99499 NO LOS: ICD-10-PCS | Mod: S$GLB,,, | Performed by: AUDIOLOGIST

## 2021-06-14 PROCEDURE — 99499 UNLISTED E&M SERVICE: CPT | Mod: S$GLB,,, | Performed by: AUDIOLOGIST

## 2021-06-15 ENCOUNTER — PROCEDURE VISIT (OUTPATIENT)
Dept: UROLOGY | Facility: CLINIC | Age: 71
End: 2021-06-15
Payer: COMMERCIAL

## 2021-06-15 VITALS
TEMPERATURE: 98 F | SYSTOLIC BLOOD PRESSURE: 118 MMHG | HEIGHT: 68 IN | BODY MASS INDEX: 26.24 KG/M2 | HEART RATE: 69 BPM | WEIGHT: 173.13 LBS | RESPIRATION RATE: 16 BRPM | DIASTOLIC BLOOD PRESSURE: 66 MMHG

## 2021-06-15 DIAGNOSIS — R97.20 RISING PSA LEVEL: ICD-10-CM

## 2021-06-15 DIAGNOSIS — R97.20 ELEVATED PSA: Primary | ICD-10-CM

## 2021-06-15 DIAGNOSIS — R93.89 ABNORMAL MRI: ICD-10-CM

## 2021-06-15 PROCEDURE — 88305 TISSUE EXAM BY PATHOLOGIST: CPT | Mod: 26,,, | Performed by: STUDENT IN AN ORGANIZED HEALTH CARE EDUCATION/TRAINING PROGRAM

## 2021-06-15 PROCEDURE — 76872 US TRANSRECTAL: CPT | Mod: 26,S$GLB,, | Performed by: UROLOGY

## 2021-06-15 PROCEDURE — 55700 TRANSRECTAL ULTRASOUND W/ BIOPSY: ICD-10-PCS | Mod: S$GLB,,, | Performed by: UROLOGY

## 2021-06-15 PROCEDURE — 88344 PR IHC OR ICC EACH MULTIPLEX ANTIBODY STAIN PROCEDURE: ICD-10-PCS | Mod: 26,,, | Performed by: STUDENT IN AN ORGANIZED HEALTH CARE EDUCATION/TRAINING PROGRAM

## 2021-06-15 PROCEDURE — 88344 IMHCHEM/IMCYTCHM EA MLT ANTB: CPT | Mod: 26,,, | Performed by: STUDENT IN AN ORGANIZED HEALTH CARE EDUCATION/TRAINING PROGRAM

## 2021-06-15 PROCEDURE — 88305 TISSUE EXAM BY PATHOLOGIST: CPT | Mod: 59 | Performed by: STUDENT IN AN ORGANIZED HEALTH CARE EDUCATION/TRAINING PROGRAM

## 2021-06-15 PROCEDURE — 88305 TISSUE EXAM BY PATHOLOGIST: ICD-10-PCS | Mod: 26,,, | Performed by: STUDENT IN AN ORGANIZED HEALTH CARE EDUCATION/TRAINING PROGRAM

## 2021-06-15 PROCEDURE — 88344 IMHCHEM/IMCYTCHM EA MLT ANTB: CPT | Performed by: STUDENT IN AN ORGANIZED HEALTH CARE EDUCATION/TRAINING PROGRAM

## 2021-06-15 PROCEDURE — 76872 TRANSRECTAL ULTRASOUND W/ BIOPSY: ICD-10-PCS | Mod: 26,S$GLB,, | Performed by: UROLOGY

## 2021-06-15 PROCEDURE — 55700 TRANSRECTAL ULTRASOUND W/ BIOPSY: CPT | Mod: S$GLB,,, | Performed by: UROLOGY

## 2021-06-15 RX ORDER — LIDOCAINE HYDROCHLORIDE 20 MG/ML
JELLY TOPICAL
Status: COMPLETED | OUTPATIENT
Start: 2021-06-15 | End: 2021-06-15

## 2021-06-15 RX ORDER — LIDOCAINE HYDROCHLORIDE 10 MG/ML
20 INJECTION INFILTRATION; PERINEURAL
Status: COMPLETED | OUTPATIENT
Start: 2021-06-15 | End: 2021-06-15

## 2021-06-15 RX ADMIN — LIDOCAINE HYDROCHLORIDE 20 ML: 10 INJECTION INFILTRATION; PERINEURAL at 08:06

## 2021-06-15 RX ADMIN — LIDOCAINE HYDROCHLORIDE: 20 JELLY TOPICAL at 08:06

## 2021-06-16 ENCOUNTER — CLINICAL SUPPORT (OUTPATIENT)
Dept: REHABILITATION | Facility: HOSPITAL | Age: 71
End: 2021-06-16
Attending: ORTHOPAEDIC SURGERY
Payer: COMMERCIAL

## 2021-06-16 DIAGNOSIS — R29.3 POOR POSTURE: ICD-10-CM

## 2021-06-16 DIAGNOSIS — R29.898 DECREASED RANGE OF MOTION OF NECK: ICD-10-CM

## 2021-06-16 DIAGNOSIS — R29.898 DECREASED STRENGTH OF UPPER EXTREMITY: ICD-10-CM

## 2021-06-16 DIAGNOSIS — M54.12 CERVICAL RADICULOPATHY: ICD-10-CM

## 2021-06-16 PROCEDURE — 97161 PT EVAL LOW COMPLEX 20 MIN: CPT

## 2021-06-16 PROCEDURE — 97110 THERAPEUTIC EXERCISES: CPT

## 2021-06-17 ENCOUNTER — CLINICAL SUPPORT (OUTPATIENT)
Dept: REHABILITATION | Facility: HOSPITAL | Age: 71
End: 2021-06-17
Payer: COMMERCIAL

## 2021-06-17 ENCOUNTER — PATIENT MESSAGE (OUTPATIENT)
Dept: REHABILITATION | Facility: HOSPITAL | Age: 71
End: 2021-06-17

## 2021-06-17 DIAGNOSIS — M79.642 PAIN IN LEFT HAND: Primary | ICD-10-CM

## 2021-06-17 PROCEDURE — 97110 THERAPEUTIC EXERCISES: CPT

## 2021-06-17 PROCEDURE — 97018 PARAFFIN BATH THERAPY: CPT

## 2021-06-17 PROCEDURE — 97140 MANUAL THERAPY 1/> REGIONS: CPT

## 2021-06-20 PROBLEM — R29.3 POOR POSTURE: Status: ACTIVE | Noted: 2021-06-20

## 2021-06-20 PROBLEM — R29.898 DECREASED RANGE OF MOTION OF NECK: Status: ACTIVE | Noted: 2021-06-20

## 2021-06-20 PROBLEM — R29.898 DECREASED STRENGTH OF UPPER EXTREMITY: Status: ACTIVE | Noted: 2021-06-20

## 2021-06-25 ENCOUNTER — TELEPHONE (OUTPATIENT)
Dept: UROLOGY | Facility: CLINIC | Age: 71
End: 2021-06-25

## 2021-06-25 ENCOUNTER — PATIENT MESSAGE (OUTPATIENT)
Dept: UROLOGY | Facility: CLINIC | Age: 71
End: 2021-06-25

## 2021-06-25 DIAGNOSIS — C61 PROSTATE CANCER: Primary | ICD-10-CM

## 2021-06-28 ENCOUNTER — PATIENT MESSAGE (OUTPATIENT)
Dept: REHABILITATION | Facility: HOSPITAL | Age: 71
End: 2021-06-28

## 2021-06-28 ENCOUNTER — PATIENT MESSAGE (OUTPATIENT)
Dept: HEMATOLOGY/ONCOLOGY | Facility: CLINIC | Age: 71
End: 2021-06-28

## 2021-06-29 ENCOUNTER — CLINICAL SUPPORT (OUTPATIENT)
Dept: REHABILITATION | Facility: HOSPITAL | Age: 71
End: 2021-06-29
Payer: COMMERCIAL

## 2021-06-29 DIAGNOSIS — R29.898 DECREASED STRENGTH OF UPPER EXTREMITY: ICD-10-CM

## 2021-06-29 DIAGNOSIS — R29.898 DECREASED RANGE OF MOTION OF NECK: ICD-10-CM

## 2021-06-29 DIAGNOSIS — R29.3 POOR POSTURE: ICD-10-CM

## 2021-06-29 PROCEDURE — 97110 THERAPEUTIC EXERCISES: CPT

## 2021-06-29 PROCEDURE — 97140 MANUAL THERAPY 1/> REGIONS: CPT

## 2021-06-30 ENCOUNTER — LAB VISIT (OUTPATIENT)
Dept: LAB | Facility: HOSPITAL | Age: 71
End: 2021-06-30
Payer: COMMERCIAL

## 2021-06-30 DIAGNOSIS — D50.9 IRON DEFICIENCY ANEMIA, UNSPECIFIED IRON DEFICIENCY ANEMIA TYPE: ICD-10-CM

## 2021-06-30 LAB
ALBUMIN SERPL BCP-MCNC: 4 G/DL (ref 3.5–5.2)
ALP SERPL-CCNC: 43 U/L (ref 55–135)
ALT SERPL W/O P-5'-P-CCNC: 21 U/L (ref 10–44)
ANION GAP SERPL CALC-SCNC: 11 MMOL/L (ref 8–16)
AST SERPL-CCNC: 23 U/L (ref 10–40)
BILIRUB SERPL-MCNC: 0.5 MG/DL (ref 0.1–1)
BUN SERPL-MCNC: 13 MG/DL (ref 8–23)
CALCIUM SERPL-MCNC: 9.7 MG/DL (ref 8.7–10.5)
CHLORIDE SERPL-SCNC: 104 MMOL/L (ref 95–110)
CO2 SERPL-SCNC: 26 MMOL/L (ref 23–29)
CREAT SERPL-MCNC: 0.8 MG/DL (ref 0.5–1.4)
ERYTHROCYTE [DISTWIDTH] IN BLOOD BY AUTOMATED COUNT: ABNORMAL % (ref 11.5–14.5)
EST. GFR  (AFRICAN AMERICAN): >60 ML/MIN/1.73 M^2
EST. GFR  (NON AFRICAN AMERICAN): >60 ML/MIN/1.73 M^2
FERRITIN SERPL-MCNC: 91 NG/ML (ref 20–300)
GLUCOSE SERPL-MCNC: 98 MG/DL (ref 70–110)
HCT VFR BLD AUTO: 44.7 % (ref 40–54)
HGB BLD-MCNC: 14.1 G/DL (ref 14–18)
IMM GRANULOCYTES # BLD AUTO: 0.01 K/UL (ref 0–0.04)
IRON SERPL-MCNC: 84 UG/DL (ref 45–160)
MCH RBC QN AUTO: 26.1 PG (ref 27–31)
MCHC RBC AUTO-ENTMCNC: 31.5 G/DL (ref 32–36)
MCV RBC AUTO: 83 FL (ref 82–98)
NEUTROPHILS # BLD AUTO: 3.5 K/UL (ref 1.8–7.7)
PLATELET # BLD AUTO: 247 K/UL (ref 150–450)
PMV BLD AUTO: 9.5 FL (ref 9.2–12.9)
POTASSIUM SERPL-SCNC: 4.2 MMOL/L (ref 3.5–5.1)
PROT SERPL-MCNC: 7.3 G/DL (ref 6–8.4)
RBC # BLD AUTO: 5.4 M/UL (ref 4.6–6.2)
SATURATED IRON: 27 % (ref 20–50)
SODIUM SERPL-SCNC: 141 MMOL/L (ref 136–145)
TOTAL IRON BINDING CAPACITY: 315 UG/DL (ref 250–450)
TRANSFERRIN SERPL-MCNC: 213 MG/DL (ref 200–375)
WBC # BLD AUTO: 6.49 K/UL (ref 3.9–12.7)

## 2021-06-30 PROCEDURE — 36415 COLL VENOUS BLD VENIPUNCTURE: CPT | Performed by: NURSE PRACTITIONER

## 2021-06-30 PROCEDURE — 80053 COMPREHEN METABOLIC PANEL: CPT | Performed by: NURSE PRACTITIONER

## 2021-06-30 PROCEDURE — 83540 ASSAY OF IRON: CPT | Performed by: NURSE PRACTITIONER

## 2021-06-30 PROCEDURE — 82728 ASSAY OF FERRITIN: CPT | Performed by: NURSE PRACTITIONER

## 2021-06-30 PROCEDURE — 85027 COMPLETE CBC AUTOMATED: CPT | Performed by: NURSE PRACTITIONER

## 2021-07-01 ENCOUNTER — CLINICAL SUPPORT (OUTPATIENT)
Dept: REHABILITATION | Facility: HOSPITAL | Age: 71
End: 2021-07-01
Payer: COMMERCIAL

## 2021-07-01 DIAGNOSIS — R29.3 POOR POSTURE: ICD-10-CM

## 2021-07-01 DIAGNOSIS — R29.898 DECREASED STRENGTH OF UPPER EXTREMITY: ICD-10-CM

## 2021-07-01 DIAGNOSIS — R29.898 DECREASED RANGE OF MOTION OF NECK: ICD-10-CM

## 2021-07-01 DIAGNOSIS — M79.642 PAIN IN LEFT HAND: Primary | ICD-10-CM

## 2021-07-01 PROCEDURE — 97110 THERAPEUTIC EXERCISES: CPT

## 2021-07-01 PROCEDURE — 97022 WHIRLPOOL THERAPY: CPT

## 2021-07-01 PROCEDURE — 97140 MANUAL THERAPY 1/> REGIONS: CPT

## 2021-07-02 ENCOUNTER — OFFICE VISIT (OUTPATIENT)
Dept: HEMATOLOGY/ONCOLOGY | Facility: CLINIC | Age: 71
End: 2021-07-02
Payer: COMMERCIAL

## 2021-07-02 DIAGNOSIS — C61 PROSTATE CANCER: ICD-10-CM

## 2021-07-02 DIAGNOSIS — D50.9 IRON DEFICIENCY ANEMIA, UNSPECIFIED IRON DEFICIENCY ANEMIA TYPE: Primary | ICD-10-CM

## 2021-07-02 PROCEDURE — 1159F MED LIST DOCD IN RCRD: CPT | Mod: ,,, | Performed by: NURSE PRACTITIONER

## 2021-07-02 PROCEDURE — 1159F PR MEDICATION LIST DOCUMENTED IN MEDICAL RECORD: ICD-10-PCS | Mod: ,,, | Performed by: NURSE PRACTITIONER

## 2021-07-02 PROCEDURE — 1157F PR ADVANCE CARE PLAN OR EQUIV PRESENT IN MEDICAL RECORD: ICD-10-PCS | Mod: ,,, | Performed by: NURSE PRACTITIONER

## 2021-07-02 PROCEDURE — 1157F ADVNC CARE PLAN IN RCRD: CPT | Mod: ,,, | Performed by: NURSE PRACTITIONER

## 2021-07-02 PROCEDURE — 99214 PR OFFICE/OUTPT VISIT, EST, LEVL IV, 30-39 MIN: ICD-10-PCS | Mod: 95,,, | Performed by: NURSE PRACTITIONER

## 2021-07-02 PROCEDURE — 99214 OFFICE O/P EST MOD 30 MIN: CPT | Mod: 95,,, | Performed by: NURSE PRACTITIONER

## 2021-07-06 ENCOUNTER — TELEPHONE (OUTPATIENT)
Dept: OPHTHALMOLOGY | Facility: CLINIC | Age: 71
End: 2021-07-06

## 2021-07-07 ENCOUNTER — PATIENT MESSAGE (OUTPATIENT)
Dept: HEMATOLOGY/ONCOLOGY | Facility: CLINIC | Age: 71
End: 2021-07-07

## 2021-07-07 ENCOUNTER — TELEPHONE (OUTPATIENT)
Dept: HEMATOLOGY/ONCOLOGY | Facility: CLINIC | Age: 71
End: 2021-07-07

## 2021-07-07 ENCOUNTER — PATIENT MESSAGE (OUTPATIENT)
Dept: UROLOGY | Facility: CLINIC | Age: 71
End: 2021-07-07

## 2021-07-07 DIAGNOSIS — C61 PROSTATE CANCER: Primary | ICD-10-CM

## 2021-07-14 ENCOUNTER — HOSPITAL ENCOUNTER (OUTPATIENT)
Dept: RADIOLOGY | Facility: HOSPITAL | Age: 71
Discharge: HOME | End: 2021-07-14
Attending: NURSE PRACTITIONER
Payer: COMMERCIAL

## 2021-07-14 ENCOUNTER — PATIENT MESSAGE (OUTPATIENT)
Dept: ORTHOPEDICS | Facility: CLINIC | Age: 71
End: 2021-07-14

## 2021-07-14 DIAGNOSIS — M25.562 PAIN IN BOTH KNEES, UNSPECIFIED CHRONICITY: ICD-10-CM

## 2021-07-14 DIAGNOSIS — M25.562 PAIN IN BOTH KNEES, UNSPECIFIED CHRONICITY: Primary | ICD-10-CM

## 2021-07-14 DIAGNOSIS — M25.561 PAIN IN BOTH KNEES, UNSPECIFIED CHRONICITY: Primary | ICD-10-CM

## 2021-07-14 DIAGNOSIS — M25.561 PAIN IN BOTH KNEES, UNSPECIFIED CHRONICITY: ICD-10-CM

## 2021-07-14 PROCEDURE — 73564 X-RAY EXAM KNEE 4 OR MORE: CPT | Mod: TC,50

## 2021-07-14 PROCEDURE — 73564 XR KNEE ORTHO BILAT WITH FLEXION: ICD-10-PCS | Mod: 26,50,, | Performed by: RADIOLOGY

## 2021-07-14 PROCEDURE — 73564 X-RAY EXAM KNEE 4 OR MORE: CPT | Mod: 26,50,, | Performed by: RADIOLOGY

## 2021-07-15 ENCOUNTER — OFFICE VISIT (OUTPATIENT)
Dept: ORTHOPEDICS | Facility: CLINIC | Age: 71
End: 2021-07-15
Payer: COMMERCIAL

## 2021-07-15 ENCOUNTER — CLINICAL SUPPORT (OUTPATIENT)
Dept: REHABILITATION | Facility: HOSPITAL | Age: 71
End: 2021-07-15
Payer: COMMERCIAL

## 2021-07-15 ENCOUNTER — PATIENT MESSAGE (OUTPATIENT)
Dept: REHABILITATION | Facility: HOSPITAL | Age: 71
End: 2021-07-15

## 2021-07-15 ENCOUNTER — HOSPITAL ENCOUNTER (OUTPATIENT)
Dept: RADIOLOGY | Facility: HOSPITAL | Age: 71
Discharge: HOME | End: 2021-07-15
Attending: NURSE PRACTITIONER
Payer: COMMERCIAL

## 2021-07-15 DIAGNOSIS — M17.12 PRIMARY OSTEOARTHRITIS OF LEFT KNEE: Primary | ICD-10-CM

## 2021-07-15 DIAGNOSIS — R29.3 POOR POSTURE: ICD-10-CM

## 2021-07-15 DIAGNOSIS — R29.898 DECREASED STRENGTH OF UPPER EXTREMITY: ICD-10-CM

## 2021-07-15 DIAGNOSIS — C61 PROSTATE CANCER: ICD-10-CM

## 2021-07-15 DIAGNOSIS — R29.898 DECREASED RANGE OF MOTION OF NECK: ICD-10-CM

## 2021-07-15 DIAGNOSIS — M79.642 PAIN IN LEFT HAND: Primary | ICD-10-CM

## 2021-07-15 PROCEDURE — 3288F PR FALLS RISK ASSESSMENT DOCUMENTED: ICD-10-PCS | Mod: CPTII,S$GLB,, | Performed by: NURSE PRACTITIONER

## 2021-07-15 PROCEDURE — 20610 PR DRAIN/INJECT LARGE JOINT/BURSA: ICD-10-PCS | Mod: LT,S$GLB,, | Performed by: NURSE PRACTITIONER

## 2021-07-15 PROCEDURE — 99213 PR OFFICE/OUTPT VISIT, EST, LEVL III, 20-29 MIN: ICD-10-PCS | Mod: 25,S$GLB,, | Performed by: NURSE PRACTITIONER

## 2021-07-15 PROCEDURE — 1157F PR ADVANCE CARE PLAN OR EQUIV PRESENT IN MEDICAL RECORD: ICD-10-PCS | Mod: S$GLB,,, | Performed by: NURSE PRACTITIONER

## 2021-07-15 PROCEDURE — 99999 PR PBB SHADOW E&M-EST. PATIENT-LVL II: CPT | Mod: PBBFAC,,, | Performed by: NURSE PRACTITIONER

## 2021-07-15 PROCEDURE — 97110 THERAPEUTIC EXERCISES: CPT

## 2021-07-15 PROCEDURE — 74177 CT ABD & PELVIS W/CONTRAST: CPT | Mod: TC

## 2021-07-15 PROCEDURE — 97140 MANUAL THERAPY 1/> REGIONS: CPT

## 2021-07-15 PROCEDURE — 97022 WHIRLPOOL THERAPY: CPT

## 2021-07-15 PROCEDURE — 25500020 PHARM REV CODE 255: Performed by: NURSE PRACTITIONER

## 2021-07-15 PROCEDURE — 99999 PR PBB SHADOW E&M-EST. PATIENT-LVL II: ICD-10-PCS | Mod: PBBFAC,,, | Performed by: NURSE PRACTITIONER

## 2021-07-15 PROCEDURE — 20610 DRAIN/INJ JOINT/BURSA W/O US: CPT | Mod: LT,S$GLB,, | Performed by: NURSE PRACTITIONER

## 2021-07-15 PROCEDURE — 71260 CT THORAX DX C+: CPT | Mod: 26,,, | Performed by: RADIOLOGY

## 2021-07-15 PROCEDURE — 1101F PR PT FALLS ASSESS DOC 0-1 FALLS W/OUT INJ PAST YR: ICD-10-PCS | Mod: CPTII,S$GLB,, | Performed by: NURSE PRACTITIONER

## 2021-07-15 PROCEDURE — 1101F PT FALLS ASSESS-DOCD LE1/YR: CPT | Mod: CPTII,S$GLB,, | Performed by: NURSE PRACTITIONER

## 2021-07-15 PROCEDURE — 99213 OFFICE O/P EST LOW 20 MIN: CPT | Mod: 25,S$GLB,, | Performed by: NURSE PRACTITIONER

## 2021-07-15 PROCEDURE — 71260 CT THORAX DX C+: CPT | Mod: TC

## 2021-07-15 PROCEDURE — 1125F AMNT PAIN NOTED PAIN PRSNT: CPT | Mod: S$GLB,,, | Performed by: NURSE PRACTITIONER

## 2021-07-15 PROCEDURE — 71260 CT CHEST ABDOMEN PELVIS WITH CONTRAST (XPD): ICD-10-PCS | Mod: 26,,, | Performed by: RADIOLOGY

## 2021-07-15 PROCEDURE — 1159F PR MEDICATION LIST DOCUMENTED IN MEDICAL RECORD: ICD-10-PCS | Mod: S$GLB,,, | Performed by: NURSE PRACTITIONER

## 2021-07-15 PROCEDURE — 1125F PR PAIN SEVERITY QUANTIFIED, PAIN PRESENT: ICD-10-PCS | Mod: S$GLB,,, | Performed by: NURSE PRACTITIONER

## 2021-07-15 PROCEDURE — 1157F ADVNC CARE PLAN IN RCRD: CPT | Mod: S$GLB,,, | Performed by: NURSE PRACTITIONER

## 2021-07-15 PROCEDURE — 74177 CT ABD & PELVIS W/CONTRAST: CPT | Mod: 26,,, | Performed by: RADIOLOGY

## 2021-07-15 PROCEDURE — 3288F FALL RISK ASSESSMENT DOCD: CPT | Mod: CPTII,S$GLB,, | Performed by: NURSE PRACTITIONER

## 2021-07-15 PROCEDURE — 74177 CT CHEST ABDOMEN PELVIS WITH CONTRAST (XPD): ICD-10-PCS | Mod: 26,,, | Performed by: RADIOLOGY

## 2021-07-15 PROCEDURE — 1159F MED LIST DOCD IN RCRD: CPT | Mod: S$GLB,,, | Performed by: NURSE PRACTITIONER

## 2021-07-15 RX ORDER — TRIAMCINOLONE ACETONIDE 40 MG/ML
40 INJECTION, SUSPENSION INTRA-ARTICULAR; INTRAMUSCULAR
Status: COMPLETED | OUTPATIENT
Start: 2021-07-15 | End: 2021-07-15

## 2021-07-15 RX ADMIN — TRIAMCINOLONE ACETONIDE 40 MG: 40 INJECTION, SUSPENSION INTRA-ARTICULAR; INTRAMUSCULAR at 04:07

## 2021-07-15 RX ADMIN — IOHEXOL 15 ML: 350 INJECTION, SOLUTION INTRAVENOUS at 04:07

## 2021-07-15 RX ADMIN — IOHEXOL 100 ML: 350 INJECTION, SOLUTION INTRAVENOUS at 04:07

## 2021-07-19 ENCOUNTER — CLINICAL SUPPORT (OUTPATIENT)
Dept: AUDIOLOGY | Facility: CLINIC | Age: 71
End: 2021-07-19
Payer: COMMERCIAL

## 2021-07-19 ENCOUNTER — OFFICE VISIT (OUTPATIENT)
Dept: HEMATOLOGY/ONCOLOGY | Facility: CLINIC | Age: 71
End: 2021-07-19
Payer: COMMERCIAL

## 2021-07-19 ENCOUNTER — OFFICE VISIT (OUTPATIENT)
Dept: ORTHOPEDICS | Facility: CLINIC | Age: 71
End: 2021-07-19
Payer: COMMERCIAL

## 2021-07-19 VITALS
SYSTOLIC BLOOD PRESSURE: 124 MMHG | HEIGHT: 68 IN | RESPIRATION RATE: 18 BRPM | TEMPERATURE: 98 F | BODY MASS INDEX: 26.4 KG/M2 | WEIGHT: 174.19 LBS | HEART RATE: 55 BPM | OXYGEN SATURATION: 95 % | DIASTOLIC BLOOD PRESSURE: 77 MMHG

## 2021-07-19 VITALS — BODY MASS INDEX: 26.23 KG/M2 | WEIGHT: 173.06 LBS | HEIGHT: 68 IN

## 2021-07-19 DIAGNOSIS — H90.3 SENSORINEURAL HEARING LOSS, BILATERAL: Primary | ICD-10-CM

## 2021-07-19 DIAGNOSIS — C61 PROSTATE CANCER: Primary | ICD-10-CM

## 2021-07-19 DIAGNOSIS — D50.9 IRON DEFICIENCY ANEMIA, UNSPECIFIED IRON DEFICIENCY ANEMIA TYPE: ICD-10-CM

## 2021-07-19 DIAGNOSIS — H93.11 TINNITUS, RIGHT EAR: ICD-10-CM

## 2021-07-19 DIAGNOSIS — M72.0 DUPUYTREN'S CONTRACTURE OF LEFT HAND: ICD-10-CM

## 2021-07-19 DIAGNOSIS — H93.11 TINNITUS OF RIGHT EAR: Primary | ICD-10-CM

## 2021-07-19 DIAGNOSIS — G56.01 RIGHT CARPAL TUNNEL SYNDROME: Primary | ICD-10-CM

## 2021-07-19 PROCEDURE — 92625 TINNITUS ASSESSMENT: CPT | Mod: S$GLB,,, | Performed by: AUDIOLOGIST

## 2021-07-19 PROCEDURE — 99024 PR POST-OP FOLLOW-UP VISIT: ICD-10-PCS | Mod: S$GLB,,, | Performed by: ORTHOPAEDIC SURGERY

## 2021-07-19 PROCEDURE — 1157F PR ADVANCE CARE PLAN OR EQUIV PRESENT IN MEDICAL RECORD: ICD-10-PCS | Mod: CPTII,S$GLB,, | Performed by: ORTHOPAEDIC SURGERY

## 2021-07-19 PROCEDURE — 3008F BODY MASS INDEX DOCD: CPT | Mod: CPTII,S$GLB,, | Performed by: ORTHOPAEDIC SURGERY

## 2021-07-19 PROCEDURE — 1126F PR PAIN SEVERITY QUANTIFIED, NO PAIN PRESENT: ICD-10-PCS | Mod: CPTII,S$GLB,, | Performed by: INTERNAL MEDICINE

## 2021-07-19 PROCEDURE — 99999 PR PBB SHADOW E&M-EST. PATIENT-LVL III: CPT | Mod: PBBFAC,,, | Performed by: ORTHOPAEDIC SURGERY

## 2021-07-19 PROCEDURE — 99214 PR OFFICE/OUTPT VISIT, EST, LEVL IV, 30-39 MIN: ICD-10-PCS | Mod: S$GLB,,, | Performed by: INTERNAL MEDICINE

## 2021-07-19 PROCEDURE — 99214 OFFICE O/P EST MOD 30 MIN: CPT | Mod: S$GLB,,, | Performed by: INTERNAL MEDICINE

## 2021-07-19 PROCEDURE — 3008F BODY MASS INDEX DOCD: CPT | Mod: CPTII,S$GLB,, | Performed by: INTERNAL MEDICINE

## 2021-07-19 PROCEDURE — 1126F AMNT PAIN NOTED NONE PRSNT: CPT | Mod: CPTII,S$GLB,, | Performed by: INTERNAL MEDICINE

## 2021-07-19 PROCEDURE — 3008F PR BODY MASS INDEX (BMI) DOCUMENTED: ICD-10-PCS | Mod: CPTII,S$GLB,, | Performed by: INTERNAL MEDICINE

## 2021-07-19 PROCEDURE — 1101F PT FALLS ASSESS-DOCD LE1/YR: CPT | Mod: CPTII,S$GLB,, | Performed by: INTERNAL MEDICINE

## 2021-07-19 PROCEDURE — 99999 PR PBB SHADOW E&M-EST. PATIENT-LVL III: ICD-10-PCS | Mod: PBBFAC,,, | Performed by: INTERNAL MEDICINE

## 2021-07-19 PROCEDURE — 99999 PR PBB SHADOW E&M-EST. PATIENT-LVL III: ICD-10-PCS | Mod: PBBFAC,,, | Performed by: ORTHOPAEDIC SURGERY

## 2021-07-19 PROCEDURE — 3008F PR BODY MASS INDEX (BMI) DOCUMENTED: ICD-10-PCS | Mod: CPTII,S$GLB,, | Performed by: ORTHOPAEDIC SURGERY

## 2021-07-19 PROCEDURE — 1159F PR MEDICATION LIST DOCUMENTED IN MEDICAL RECORD: ICD-10-PCS | Mod: CPTII,S$GLB,, | Performed by: INTERNAL MEDICINE

## 2021-07-19 PROCEDURE — 3288F FALL RISK ASSESSMENT DOCD: CPT | Mod: CPTII,S$GLB,, | Performed by: INTERNAL MEDICINE

## 2021-07-19 PROCEDURE — 99499 NO LOS: ICD-10-PCS | Mod: ,,, | Performed by: AUDIOLOGIST

## 2021-07-19 PROCEDURE — 99499 UNLISTED E&M SERVICE: CPT | Mod: ,,, | Performed by: AUDIOLOGIST

## 2021-07-19 PROCEDURE — 1157F ADVNC CARE PLAN IN RCRD: CPT | Mod: CPTII,S$GLB,, | Performed by: INTERNAL MEDICINE

## 2021-07-19 PROCEDURE — 1125F PR PAIN SEVERITY QUANTIFIED, PAIN PRESENT: ICD-10-PCS | Mod: CPTII,S$GLB,, | Performed by: ORTHOPAEDIC SURGERY

## 2021-07-19 PROCEDURE — 1157F PR ADVANCE CARE PLAN OR EQUIV PRESENT IN MEDICAL RECORD: ICD-10-PCS | Mod: CPTII,S$GLB,, | Performed by: INTERNAL MEDICINE

## 2021-07-19 PROCEDURE — 1157F ADVNC CARE PLAN IN RCRD: CPT | Mod: CPTII,S$GLB,, | Performed by: ORTHOPAEDIC SURGERY

## 2021-07-19 PROCEDURE — 99024 POSTOP FOLLOW-UP VISIT: CPT | Mod: S$GLB,,, | Performed by: ORTHOPAEDIC SURGERY

## 2021-07-19 PROCEDURE — 3288F PR FALLS RISK ASSESSMENT DOCUMENTED: ICD-10-PCS | Mod: CPTII,S$GLB,, | Performed by: INTERNAL MEDICINE

## 2021-07-19 PROCEDURE — 1125F AMNT PAIN NOTED PAIN PRSNT: CPT | Mod: CPTII,S$GLB,, | Performed by: ORTHOPAEDIC SURGERY

## 2021-07-19 PROCEDURE — 99999 PR PBB SHADOW E&M-EST. PATIENT-LVL III: CPT | Mod: PBBFAC,,, | Performed by: INTERNAL MEDICINE

## 2021-07-19 PROCEDURE — 92625 PR TINNITUS ASSESSMENT: ICD-10-PCS | Mod: S$GLB,,, | Performed by: AUDIOLOGIST

## 2021-07-19 PROCEDURE — 1159F MED LIST DOCD IN RCRD: CPT | Mod: CPTII,S$GLB,, | Performed by: INTERNAL MEDICINE

## 2021-07-19 PROCEDURE — 1101F PR PT FALLS ASSESS DOC 0-1 FALLS W/OUT INJ PAST YR: ICD-10-PCS | Mod: CPTII,S$GLB,, | Performed by: INTERNAL MEDICINE

## 2021-07-20 ENCOUNTER — PATIENT MESSAGE (OUTPATIENT)
Dept: ORTHOPEDICS | Facility: CLINIC | Age: 71
End: 2021-07-20

## 2021-07-21 ENCOUNTER — PATIENT MESSAGE (OUTPATIENT)
Dept: REHABILITATION | Facility: HOSPITAL | Age: 71
End: 2021-07-21

## 2021-07-22 ENCOUNTER — OFFICE VISIT (OUTPATIENT)
Dept: RADIATION ONCOLOGY | Facility: CLINIC | Age: 71
End: 2021-07-22
Payer: COMMERCIAL

## 2021-07-22 ENCOUNTER — OFFICE VISIT (OUTPATIENT)
Dept: UROLOGY | Facility: CLINIC | Age: 71
End: 2021-07-22
Payer: COMMERCIAL

## 2021-07-22 VITALS
HEIGHT: 68 IN | TEMPERATURE: 98 F | SYSTOLIC BLOOD PRESSURE: 128 MMHG | BODY MASS INDEX: 26.83 KG/M2 | HEART RATE: 74 BPM | OXYGEN SATURATION: 96 % | HEART RATE: 67 BPM | RESPIRATION RATE: 18 BRPM | DIASTOLIC BLOOD PRESSURE: 64 MMHG | WEIGHT: 179.81 LBS | SYSTOLIC BLOOD PRESSURE: 122 MMHG | RESPIRATION RATE: 15 BRPM | WEIGHT: 177 LBS | DIASTOLIC BLOOD PRESSURE: 72 MMHG | BODY MASS INDEX: 27.25 KG/M2 | HEIGHT: 68 IN

## 2021-07-22 DIAGNOSIS — C61 PROSTATE CANCER: ICD-10-CM

## 2021-07-22 PROCEDURE — 1159F PR MEDICATION LIST DOCUMENTED IN MEDICAL RECORD: ICD-10-PCS | Mod: CPTII,S$GLB,, | Performed by: UROLOGY

## 2021-07-22 PROCEDURE — 1157F ADVNC CARE PLAN IN RCRD: CPT | Mod: CPTII,S$GLB,, | Performed by: RADIOLOGY

## 2021-07-22 PROCEDURE — 1159F PR MEDICATION LIST DOCUMENTED IN MEDICAL RECORD: ICD-10-PCS | Mod: CPTII,S$GLB,, | Performed by: RADIOLOGY

## 2021-07-22 PROCEDURE — 3008F BODY MASS INDEX DOCD: CPT | Mod: CPTII,S$GLB,, | Performed by: RADIOLOGY

## 2021-07-22 PROCEDURE — 99999 PR PBB SHADOW E&M-EST. PATIENT-LVL III: CPT | Mod: PBBFAC,,, | Performed by: UROLOGY

## 2021-07-22 PROCEDURE — 99203 OFFICE O/P NEW LOW 30 MIN: CPT | Mod: S$GLB,,, | Performed by: RADIOLOGY

## 2021-07-22 PROCEDURE — 99999 PR PBB SHADOW E&M-EST. PATIENT-LVL III: CPT | Mod: PBBFAC,,, | Performed by: RADIOLOGY

## 2021-07-22 PROCEDURE — 3008F PR BODY MASS INDEX (BMI) DOCUMENTED: ICD-10-PCS | Mod: CPTII,S$GLB,, | Performed by: UROLOGY

## 2021-07-22 PROCEDURE — 1157F ADVNC CARE PLAN IN RCRD: CPT | Mod: CPTII,S$GLB,, | Performed by: UROLOGY

## 2021-07-22 PROCEDURE — 99999 PR PBB SHADOW E&M-EST. PATIENT-LVL III: ICD-10-PCS | Mod: PBBFAC,,, | Performed by: RADIOLOGY

## 2021-07-22 PROCEDURE — 99999 PR PBB SHADOW E&M-EST. PATIENT-LVL III: ICD-10-PCS | Mod: PBBFAC,,, | Performed by: UROLOGY

## 2021-07-22 PROCEDURE — 1101F PR PT FALLS ASSESS DOC 0-1 FALLS W/OUT INJ PAST YR: ICD-10-PCS | Mod: CPTII,S$GLB,, | Performed by: UROLOGY

## 2021-07-22 PROCEDURE — 1125F AMNT PAIN NOTED PAIN PRSNT: CPT | Mod: CPTII,S$GLB,, | Performed by: RADIOLOGY

## 2021-07-22 PROCEDURE — 99214 PR OFFICE/OUTPT VISIT, EST, LEVL IV, 30-39 MIN: ICD-10-PCS | Mod: S$GLB,,, | Performed by: UROLOGY

## 2021-07-22 PROCEDURE — 99203 PR OFFICE/OUTPT VISIT, NEW, LEVL III, 30-44 MIN: ICD-10-PCS | Mod: S$GLB,,, | Performed by: RADIOLOGY

## 2021-07-22 PROCEDURE — 1157F PR ADVANCE CARE PLAN OR EQUIV PRESENT IN MEDICAL RECORD: ICD-10-PCS | Mod: CPTII,S$GLB,, | Performed by: UROLOGY

## 2021-07-22 PROCEDURE — 3008F PR BODY MASS INDEX (BMI) DOCUMENTED: ICD-10-PCS | Mod: CPTII,S$GLB,, | Performed by: RADIOLOGY

## 2021-07-22 PROCEDURE — 3288F FALL RISK ASSESSMENT DOCD: CPT | Mod: CPTII,S$GLB,, | Performed by: RADIOLOGY

## 2021-07-22 PROCEDURE — 1101F PR PT FALLS ASSESS DOC 0-1 FALLS W/OUT INJ PAST YR: ICD-10-PCS | Mod: CPTII,S$GLB,, | Performed by: RADIOLOGY

## 2021-07-22 PROCEDURE — 3288F PR FALLS RISK ASSESSMENT DOCUMENTED: ICD-10-PCS | Mod: CPTII,S$GLB,, | Performed by: UROLOGY

## 2021-07-22 PROCEDURE — 3288F FALL RISK ASSESSMENT DOCD: CPT | Mod: CPTII,S$GLB,, | Performed by: UROLOGY

## 2021-07-22 PROCEDURE — 1125F AMNT PAIN NOTED PAIN PRSNT: CPT | Mod: CPTII,S$GLB,, | Performed by: UROLOGY

## 2021-07-22 PROCEDURE — 3008F BODY MASS INDEX DOCD: CPT | Mod: CPTII,S$GLB,, | Performed by: UROLOGY

## 2021-07-22 PROCEDURE — 3288F PR FALLS RISK ASSESSMENT DOCUMENTED: ICD-10-PCS | Mod: CPTII,S$GLB,, | Performed by: RADIOLOGY

## 2021-07-22 PROCEDURE — 1159F MED LIST DOCD IN RCRD: CPT | Mod: CPTII,S$GLB,, | Performed by: UROLOGY

## 2021-07-22 PROCEDURE — 1159F MED LIST DOCD IN RCRD: CPT | Mod: CPTII,S$GLB,, | Performed by: RADIOLOGY

## 2021-07-22 PROCEDURE — 99214 OFFICE O/P EST MOD 30 MIN: CPT | Mod: S$GLB,,, | Performed by: UROLOGY

## 2021-07-22 PROCEDURE — 1101F PT FALLS ASSESS-DOCD LE1/YR: CPT | Mod: CPTII,S$GLB,, | Performed by: UROLOGY

## 2021-07-22 PROCEDURE — 1101F PT FALLS ASSESS-DOCD LE1/YR: CPT | Mod: CPTII,S$GLB,, | Performed by: RADIOLOGY

## 2021-07-22 PROCEDURE — 1157F PR ADVANCE CARE PLAN OR EQUIV PRESENT IN MEDICAL RECORD: ICD-10-PCS | Mod: CPTII,S$GLB,, | Performed by: RADIOLOGY

## 2021-07-22 PROCEDURE — 1125F PR PAIN SEVERITY QUANTIFIED, PAIN PRESENT: ICD-10-PCS | Mod: CPTII,S$GLB,, | Performed by: RADIOLOGY

## 2021-07-22 PROCEDURE — 1125F PR PAIN SEVERITY QUANTIFIED, PAIN PRESENT: ICD-10-PCS | Mod: CPTII,S$GLB,, | Performed by: UROLOGY

## 2021-07-26 ENCOUNTER — PATIENT MESSAGE (OUTPATIENT)
Dept: REHABILITATION | Facility: HOSPITAL | Age: 71
End: 2021-07-26

## 2021-07-28 ENCOUNTER — PATIENT OUTREACH (OUTPATIENT)
Dept: ADMINISTRATIVE | Facility: HOSPITAL | Age: 71
End: 2021-07-28

## 2021-07-28 ENCOUNTER — CLINICAL SUPPORT (OUTPATIENT)
Dept: REHABILITATION | Facility: HOSPITAL | Age: 71
End: 2021-07-28
Payer: COMMERCIAL

## 2021-07-28 DIAGNOSIS — M79.642 PAIN IN LEFT HAND: Primary | ICD-10-CM

## 2021-07-28 PROCEDURE — 97110 THERAPEUTIC EXERCISES: CPT

## 2021-07-28 PROCEDURE — 97140 MANUAL THERAPY 1/> REGIONS: CPT

## 2021-07-29 ENCOUNTER — PATIENT OUTREACH (OUTPATIENT)
Dept: ADMINISTRATIVE | Facility: HOSPITAL | Age: 71
End: 2021-07-29

## 2021-07-29 DIAGNOSIS — Z00.00 ENCOUNTER FOR ANNUAL PHYSICAL EXAM: ICD-10-CM

## 2021-07-29 DIAGNOSIS — E78.5 HYPERLIPIDEMIA, UNSPECIFIED HYPERLIPIDEMIA TYPE: Primary | ICD-10-CM

## 2021-08-04 ENCOUNTER — PATIENT MESSAGE (OUTPATIENT)
Dept: NEUROLOGY | Facility: CLINIC | Age: 71
End: 2021-08-04

## 2021-08-04 ENCOUNTER — CLINICAL SUPPORT (OUTPATIENT)
Dept: REHABILITATION | Facility: HOSPITAL | Age: 71
End: 2021-08-04
Payer: COMMERCIAL

## 2021-08-04 DIAGNOSIS — M79.642 PAIN IN LEFT HAND: ICD-10-CM

## 2021-08-04 PROCEDURE — 97140 MANUAL THERAPY 1/> REGIONS: CPT

## 2021-08-04 PROCEDURE — 97110 THERAPEUTIC EXERCISES: CPT

## 2021-08-04 PROCEDURE — 97530 THERAPEUTIC ACTIVITIES: CPT

## 2021-08-05 ENCOUNTER — PATIENT MESSAGE (OUTPATIENT)
Dept: NEUROLOGY | Facility: CLINIC | Age: 71
End: 2021-08-05

## 2021-08-05 DIAGNOSIS — M21.379 FOOT-DROP, UNSPECIFIED LATERALITY: Primary | ICD-10-CM

## 2021-08-05 DIAGNOSIS — G43.909 MIGRAINE WITHOUT STATUS MIGRAINOSUS, NOT INTRACTABLE, UNSPECIFIED MIGRAINE TYPE: ICD-10-CM

## 2021-08-05 RX ORDER — RIMEGEPANT SULFATE 75 MG/75MG
TABLET, ORALLY DISINTEGRATING ORAL
Qty: 8 TABLET | Refills: 0 | Status: SHIPPED | OUTPATIENT
Start: 2021-08-05 | End: 2021-09-10

## 2021-08-06 ENCOUNTER — TELEPHONE (OUTPATIENT)
Dept: PHARMACY | Facility: CLINIC | Age: 71
End: 2021-08-06

## 2021-08-10 ENCOUNTER — CLINICAL SUPPORT (OUTPATIENT)
Dept: REHABILITATION | Facility: HOSPITAL | Age: 71
End: 2021-08-10
Attending: PSYCHIATRY & NEUROLOGY
Payer: COMMERCIAL

## 2021-08-10 DIAGNOSIS — R26.9 IMPAIRED GAIT: ICD-10-CM

## 2021-08-10 DIAGNOSIS — M21.371 FOOT DROP, BILATERAL: ICD-10-CM

## 2021-08-10 DIAGNOSIS — M21.372 FOOT DROP, BILATERAL: ICD-10-CM

## 2021-08-10 DIAGNOSIS — R26.89 BALANCE PROBLEMS: ICD-10-CM

## 2021-08-10 DIAGNOSIS — M21.379 FOOT-DROP, UNSPECIFIED LATERALITY: ICD-10-CM

## 2021-08-10 PROCEDURE — 97110 THERAPEUTIC EXERCISES: CPT | Mod: PN

## 2021-08-10 PROCEDURE — 97161 PT EVAL LOW COMPLEX 20 MIN: CPT | Mod: PN

## 2021-08-11 ENCOUNTER — CLINICAL SUPPORT (OUTPATIENT)
Dept: REHABILITATION | Facility: HOSPITAL | Age: 71
End: 2021-08-11
Payer: COMMERCIAL

## 2021-08-11 DIAGNOSIS — M79.642 PAIN IN LEFT HAND: Primary | ICD-10-CM

## 2021-08-11 PROCEDURE — 97110 THERAPEUTIC EXERCISES: CPT

## 2021-08-11 PROCEDURE — 97530 THERAPEUTIC ACTIVITIES: CPT

## 2021-08-11 PROCEDURE — 97140 MANUAL THERAPY 1/> REGIONS: CPT

## 2021-08-12 ENCOUNTER — LAB VISIT (OUTPATIENT)
Dept: LAB | Facility: HOSPITAL | Age: 71
End: 2021-08-12
Payer: COMMERCIAL

## 2021-08-12 ENCOUNTER — TELEPHONE (OUTPATIENT)
Dept: HEMATOLOGY/ONCOLOGY | Facility: CLINIC | Age: 71
End: 2021-08-12

## 2021-08-12 DIAGNOSIS — C61 PROSTATE CANCER: ICD-10-CM

## 2021-08-12 PROBLEM — R26.89 BALANCE PROBLEMS: Status: ACTIVE | Noted: 2021-08-12

## 2021-08-12 PROBLEM — M21.371 FOOT DROP, BILATERAL: Status: ACTIVE | Noted: 2021-08-12

## 2021-08-12 PROBLEM — M21.372 FOOT DROP, BILATERAL: Status: ACTIVE | Noted: 2021-08-12

## 2021-08-12 PROBLEM — R26.9 IMPAIRED GAIT: Status: ACTIVE | Noted: 2021-08-12

## 2021-08-12 LAB
ALBUMIN SERPL BCP-MCNC: 4.1 G/DL (ref 3.5–5.2)
ALP SERPL-CCNC: 41 U/L (ref 55–135)
ALT SERPL W/O P-5'-P-CCNC: 24 U/L (ref 10–44)
ANION GAP SERPL CALC-SCNC: 9 MMOL/L (ref 8–16)
AST SERPL-CCNC: 20 U/L (ref 10–40)
BILIRUB SERPL-MCNC: 0.4 MG/DL (ref 0.1–1)
BUN SERPL-MCNC: 11 MG/DL (ref 8–23)
CALCIUM SERPL-MCNC: 9.9 MG/DL (ref 8.7–10.5)
CHLORIDE SERPL-SCNC: 102 MMOL/L (ref 95–110)
CO2 SERPL-SCNC: 28 MMOL/L (ref 23–29)
CREAT SERPL-MCNC: 0.8 MG/DL (ref 0.5–1.4)
ERYTHROCYTE [DISTWIDTH] IN BLOOD BY AUTOMATED COUNT: 18 % (ref 11.5–14.5)
EST. GFR  (AFRICAN AMERICAN): >60 ML/MIN/1.73 M^2
EST. GFR  (NON AFRICAN AMERICAN): >60 ML/MIN/1.73 M^2
GLUCOSE SERPL-MCNC: 109 MG/DL (ref 70–110)
HCT VFR BLD AUTO: 47.8 % (ref 40–54)
HGB BLD-MCNC: 15.4 G/DL (ref 14–18)
IMM GRANULOCYTES # BLD AUTO: 0.01 K/UL (ref 0–0.04)
MCH RBC QN AUTO: 28.4 PG (ref 27–31)
MCHC RBC AUTO-ENTMCNC: 32.2 G/DL (ref 32–36)
MCV RBC AUTO: 88 FL (ref 82–98)
NEUTROPHILS # BLD AUTO: 3.3 K/UL (ref 1.8–7.7)
PLATELET # BLD AUTO: 189 K/UL (ref 150–450)
PMV BLD AUTO: 9.2 FL (ref 9.2–12.9)
POTASSIUM SERPL-SCNC: 4.7 MMOL/L (ref 3.5–5.1)
PROT SERPL-MCNC: 7.3 G/DL (ref 6–8.4)
RBC # BLD AUTO: 5.42 M/UL (ref 4.6–6.2)
SODIUM SERPL-SCNC: 139 MMOL/L (ref 136–145)
WBC # BLD AUTO: 5.86 K/UL (ref 3.9–12.7)

## 2021-08-12 PROCEDURE — 85027 COMPLETE CBC AUTOMATED: CPT | Performed by: NURSE PRACTITIONER

## 2021-08-12 PROCEDURE — 36415 COLL VENOUS BLD VENIPUNCTURE: CPT | Performed by: NURSE PRACTITIONER

## 2021-08-12 PROCEDURE — 80053 COMPREHEN METABOLIC PANEL: CPT | Performed by: NURSE PRACTITIONER

## 2021-08-17 ENCOUNTER — PATIENT MESSAGE (OUTPATIENT)
Dept: INTERNAL MEDICINE | Facility: CLINIC | Age: 71
End: 2021-08-17

## 2021-08-17 ENCOUNTER — PATIENT MESSAGE (OUTPATIENT)
Dept: REHABILITATION | Facility: HOSPITAL | Age: 71
End: 2021-08-17

## 2021-08-18 LAB
FINAL PATHOLOGIC DIAGNOSIS: NORMAL
Lab: NORMAL
SUPPLEMENTAL DIAGNOSIS: NORMAL

## 2021-08-19 ENCOUNTER — PATIENT MESSAGE (OUTPATIENT)
Dept: INTERNAL MEDICINE | Facility: CLINIC | Age: 71
End: 2021-08-19

## 2021-08-23 ENCOUNTER — PATIENT MESSAGE (OUTPATIENT)
Dept: ORTHOPEDICS | Facility: CLINIC | Age: 71
End: 2021-08-23

## 2021-08-23 ENCOUNTER — PATIENT MESSAGE (OUTPATIENT)
Dept: REHABILITATION | Facility: HOSPITAL | Age: 71
End: 2021-08-23

## 2021-08-25 ENCOUNTER — CLINICAL SUPPORT (OUTPATIENT)
Dept: REHABILITATION | Facility: HOSPITAL | Age: 71
End: 2021-08-25
Payer: COMMERCIAL

## 2021-08-25 DIAGNOSIS — M79.642 PAIN IN LEFT HAND: Primary | ICD-10-CM

## 2021-08-25 PROCEDURE — 97110 THERAPEUTIC EXERCISES: CPT

## 2021-08-25 PROCEDURE — 97022 WHIRLPOOL THERAPY: CPT

## 2021-08-25 PROCEDURE — 97530 THERAPEUTIC ACTIVITIES: CPT

## 2021-08-25 PROCEDURE — 97140 MANUAL THERAPY 1/> REGIONS: CPT

## 2021-08-26 ENCOUNTER — PATIENT OUTREACH (OUTPATIENT)
Dept: ADMINISTRATIVE | Facility: OTHER | Age: 71
End: 2021-08-26

## 2021-08-26 ENCOUNTER — PATIENT MESSAGE (OUTPATIENT)
Dept: ADMINISTRATIVE | Facility: OTHER | Age: 71
End: 2021-08-26

## 2021-08-27 ENCOUNTER — OFFICE VISIT (OUTPATIENT)
Dept: ORTHOPEDICS | Facility: CLINIC | Age: 71
End: 2021-08-27
Payer: COMMERCIAL

## 2021-08-27 VITALS — BODY MASS INDEX: 26.83 KG/M2 | WEIGHT: 177 LBS | HEIGHT: 68 IN

## 2021-08-27 DIAGNOSIS — M65.322 TRIGGER FINGER, LEFT INDEX FINGER: Primary | ICD-10-CM

## 2021-08-27 PROCEDURE — 99213 OFFICE O/P EST LOW 20 MIN: CPT | Mod: S$GLB,,, | Performed by: ORTHOPAEDIC SURGERY

## 2021-08-27 PROCEDURE — 3288F FALL RISK ASSESSMENT DOCD: CPT | Mod: CPTII,S$GLB,, | Performed by: ORTHOPAEDIC SURGERY

## 2021-08-27 PROCEDURE — 1159F PR MEDICATION LIST DOCUMENTED IN MEDICAL RECORD: ICD-10-PCS | Mod: CPTII,S$GLB,, | Performed by: ORTHOPAEDIC SURGERY

## 2021-08-27 PROCEDURE — 3288F PR FALLS RISK ASSESSMENT DOCUMENTED: ICD-10-PCS | Mod: CPTII,S$GLB,, | Performed by: ORTHOPAEDIC SURGERY

## 2021-08-27 PROCEDURE — 1101F PR PT FALLS ASSESS DOC 0-1 FALLS W/OUT INJ PAST YR: ICD-10-PCS | Mod: CPTII,S$GLB,, | Performed by: ORTHOPAEDIC SURGERY

## 2021-08-27 PROCEDURE — 99213 PR OFFICE/OUTPT VISIT, EST, LEVL III, 20-29 MIN: ICD-10-PCS | Mod: S$GLB,,, | Performed by: ORTHOPAEDIC SURGERY

## 2021-08-27 PROCEDURE — 1157F PR ADVANCE CARE PLAN OR EQUIV PRESENT IN MEDICAL RECORD: ICD-10-PCS | Mod: CPTII,S$GLB,, | Performed by: ORTHOPAEDIC SURGERY

## 2021-08-27 PROCEDURE — 1159F MED LIST DOCD IN RCRD: CPT | Mod: CPTII,S$GLB,, | Performed by: ORTHOPAEDIC SURGERY

## 2021-08-27 PROCEDURE — 1160F RVW MEDS BY RX/DR IN RCRD: CPT | Mod: CPTII,S$GLB,, | Performed by: ORTHOPAEDIC SURGERY

## 2021-08-27 PROCEDURE — 1160F PR REVIEW ALL MEDS BY PRESCRIBER/CLIN PHARMACIST DOCUMENTED: ICD-10-PCS | Mod: CPTII,S$GLB,, | Performed by: ORTHOPAEDIC SURGERY

## 2021-08-27 PROCEDURE — 99999 PR PBB SHADOW E&M-EST. PATIENT-LVL IV: CPT | Mod: PBBFAC,,, | Performed by: ORTHOPAEDIC SURGERY

## 2021-08-27 PROCEDURE — 3008F PR BODY MASS INDEX (BMI) DOCUMENTED: ICD-10-PCS | Mod: CPTII,S$GLB,, | Performed by: ORTHOPAEDIC SURGERY

## 2021-08-27 PROCEDURE — 1101F PT FALLS ASSESS-DOCD LE1/YR: CPT | Mod: CPTII,S$GLB,, | Performed by: ORTHOPAEDIC SURGERY

## 2021-08-27 PROCEDURE — 1125F PR PAIN SEVERITY QUANTIFIED, PAIN PRESENT: ICD-10-PCS | Mod: CPTII,S$GLB,, | Performed by: ORTHOPAEDIC SURGERY

## 2021-08-27 PROCEDURE — 1125F AMNT PAIN NOTED PAIN PRSNT: CPT | Mod: CPTII,S$GLB,, | Performed by: ORTHOPAEDIC SURGERY

## 2021-08-27 PROCEDURE — 1157F ADVNC CARE PLAN IN RCRD: CPT | Mod: CPTII,S$GLB,, | Performed by: ORTHOPAEDIC SURGERY

## 2021-08-27 PROCEDURE — 99999 PR PBB SHADOW E&M-EST. PATIENT-LVL IV: ICD-10-PCS | Mod: PBBFAC,,, | Performed by: ORTHOPAEDIC SURGERY

## 2021-08-27 PROCEDURE — 3008F BODY MASS INDEX DOCD: CPT | Mod: CPTII,S$GLB,, | Performed by: ORTHOPAEDIC SURGERY

## 2021-09-07 ENCOUNTER — TELEPHONE (OUTPATIENT)
Dept: NEUROLOGY | Facility: CLINIC | Age: 71
End: 2021-09-07

## 2021-09-07 ENCOUNTER — PATIENT MESSAGE (OUTPATIENT)
Dept: NEUROLOGY | Facility: CLINIC | Age: 71
End: 2021-09-07

## 2021-09-09 ENCOUNTER — OFFICE VISIT (OUTPATIENT)
Dept: NEUROLOGY | Facility: CLINIC | Age: 71
End: 2021-09-09
Payer: COMMERCIAL

## 2021-09-09 DIAGNOSIS — M21.371 FOOT DROP, BILATERAL: Primary | ICD-10-CM

## 2021-09-09 DIAGNOSIS — G43.909 MIGRAINE WITHOUT STATUS MIGRAINOSUS, NOT INTRACTABLE, UNSPECIFIED MIGRAINE TYPE: ICD-10-CM

## 2021-09-09 DIAGNOSIS — M21.372 FOOT DROP, BILATERAL: Primary | ICD-10-CM

## 2021-09-09 DIAGNOSIS — G47.33 OBSTRUCTIVE SLEEP APNEA SYNDROME: ICD-10-CM

## 2021-09-09 PROCEDURE — 1159F PR MEDICATION LIST DOCUMENTED IN MEDICAL RECORD: ICD-10-PCS | Mod: CPTII,,, | Performed by: PSYCHIATRY & NEUROLOGY

## 2021-09-09 PROCEDURE — 1160F PR REVIEW ALL MEDS BY PRESCRIBER/CLIN PHARMACIST DOCUMENTED: ICD-10-PCS | Mod: CPTII,,, | Performed by: PSYCHIATRY & NEUROLOGY

## 2021-09-09 PROCEDURE — 1157F ADVNC CARE PLAN IN RCRD: CPT | Mod: CPTII,,, | Performed by: PSYCHIATRY & NEUROLOGY

## 2021-09-09 PROCEDURE — 99215 OFFICE O/P EST HI 40 MIN: CPT | Mod: 95,,, | Performed by: PSYCHIATRY & NEUROLOGY

## 2021-09-09 PROCEDURE — 99215 PR OFFICE/OUTPT VISIT, EST, LEVL V, 40-54 MIN: ICD-10-PCS | Mod: 95,,, | Performed by: PSYCHIATRY & NEUROLOGY

## 2021-09-09 PROCEDURE — 1157F PR ADVANCE CARE PLAN OR EQUIV PRESENT IN MEDICAL RECORD: ICD-10-PCS | Mod: CPTII,,, | Performed by: PSYCHIATRY & NEUROLOGY

## 2021-09-09 PROCEDURE — 1159F MED LIST DOCD IN RCRD: CPT | Mod: CPTII,,, | Performed by: PSYCHIATRY & NEUROLOGY

## 2021-09-09 PROCEDURE — 1160F RVW MEDS BY RX/DR IN RCRD: CPT | Mod: CPTII,,, | Performed by: PSYCHIATRY & NEUROLOGY

## 2021-09-10 ENCOUNTER — TELEPHONE (OUTPATIENT)
Dept: ADMINISTRATIVE | Facility: OTHER | Age: 71
End: 2021-09-10

## 2021-09-10 RX ORDER — RIMEGEPANT SULFATE 75 MG/75MG
75 TABLET, ORALLY DISINTEGRATING ORAL EVERY OTHER DAY
Qty: 16 TABLET | Refills: 2 | Status: SHIPPED | OUTPATIENT
Start: 2021-09-10 | End: 2021-12-02 | Stop reason: SDUPTHER

## 2021-09-20 ENCOUNTER — PATIENT MESSAGE (OUTPATIENT)
Dept: ADMINISTRATIVE | Facility: OTHER | Age: 71
End: 2021-09-20

## 2021-09-22 ENCOUNTER — OFFICE VISIT (OUTPATIENT)
Dept: SLEEP MEDICINE | Facility: CLINIC | Age: 71
End: 2021-09-22
Payer: COMMERCIAL

## 2021-09-22 ENCOUNTER — LAB VISIT (OUTPATIENT)
Dept: LAB | Facility: OTHER | Age: 71
End: 2021-09-22
Attending: NURSE PRACTITIONER
Payer: COMMERCIAL

## 2021-09-22 ENCOUNTER — PATIENT MESSAGE (OUTPATIENT)
Dept: HEMATOLOGY/ONCOLOGY | Facility: CLINIC | Age: 71
End: 2021-09-22

## 2021-09-22 ENCOUNTER — PATIENT MESSAGE (OUTPATIENT)
Dept: PHARMACY | Facility: CLINIC | Age: 71
End: 2021-09-22

## 2021-09-22 VITALS
BODY MASS INDEX: 26.88 KG/M2 | SYSTOLIC BLOOD PRESSURE: 120 MMHG | WEIGHT: 176.81 LBS | HEART RATE: 52 BPM | DIASTOLIC BLOOD PRESSURE: 76 MMHG

## 2021-09-22 DIAGNOSIS — F33.1 MODERATE EPISODE OF RECURRENT MAJOR DEPRESSIVE DISORDER: ICD-10-CM

## 2021-09-22 DIAGNOSIS — C61 PROSTATE CANCER: ICD-10-CM

## 2021-09-22 DIAGNOSIS — G47.33 OSA (OBSTRUCTIVE SLEEP APNEA): Primary | ICD-10-CM

## 2021-09-22 LAB — COMPLEXED PSA SERPL-MCNC: 10.2 NG/ML (ref 0–4)

## 2021-09-22 PROCEDURE — 3078F PR MOST RECENT DIASTOLIC BLOOD PRESSURE < 80 MM HG: ICD-10-PCS | Mod: CPTII,S$GLB,, | Performed by: INTERNAL MEDICINE

## 2021-09-22 PROCEDURE — 1126F PR PAIN SEVERITY QUANTIFIED, NO PAIN PRESENT: ICD-10-PCS | Mod: CPTII,S$GLB,, | Performed by: INTERNAL MEDICINE

## 2021-09-22 PROCEDURE — 3288F PR FALLS RISK ASSESSMENT DOCUMENTED: ICD-10-PCS | Mod: CPTII,S$GLB,, | Performed by: INTERNAL MEDICINE

## 2021-09-22 PROCEDURE — 99999 PR PBB SHADOW E&M-EST. PATIENT-LVL IV: CPT | Mod: PBBFAC,,, | Performed by: INTERNAL MEDICINE

## 2021-09-22 PROCEDURE — 84153 ASSAY OF PSA TOTAL: CPT | Performed by: NURSE PRACTITIONER

## 2021-09-22 PROCEDURE — 36415 COLL VENOUS BLD VENIPUNCTURE: CPT | Performed by: NURSE PRACTITIONER

## 2021-09-22 PROCEDURE — 3078F DIAST BP <80 MM HG: CPT | Mod: CPTII,S$GLB,, | Performed by: INTERNAL MEDICINE

## 2021-09-22 PROCEDURE — 3008F PR BODY MASS INDEX (BMI) DOCUMENTED: ICD-10-PCS | Mod: CPTII,S$GLB,, | Performed by: INTERNAL MEDICINE

## 2021-09-22 PROCEDURE — 1159F PR MEDICATION LIST DOCUMENTED IN MEDICAL RECORD: ICD-10-PCS | Mod: CPTII,S$GLB,, | Performed by: INTERNAL MEDICINE

## 2021-09-22 PROCEDURE — 99203 PR OFFICE/OUTPT VISIT, NEW, LEVL III, 30-44 MIN: ICD-10-PCS | Mod: S$GLB,,, | Performed by: INTERNAL MEDICINE

## 2021-09-22 PROCEDURE — 3008F BODY MASS INDEX DOCD: CPT | Mod: CPTII,S$GLB,, | Performed by: INTERNAL MEDICINE

## 2021-09-22 PROCEDURE — 99999 PR PBB SHADOW E&M-EST. PATIENT-LVL IV: ICD-10-PCS | Mod: PBBFAC,,, | Performed by: INTERNAL MEDICINE

## 2021-09-22 PROCEDURE — 1157F PR ADVANCE CARE PLAN OR EQUIV PRESENT IN MEDICAL RECORD: ICD-10-PCS | Mod: CPTII,S$GLB,, | Performed by: INTERNAL MEDICINE

## 2021-09-22 PROCEDURE — 99203 OFFICE O/P NEW LOW 30 MIN: CPT | Mod: S$GLB,,, | Performed by: INTERNAL MEDICINE

## 2021-09-22 PROCEDURE — 1160F PR REVIEW ALL MEDS BY PRESCRIBER/CLIN PHARMACIST DOCUMENTED: ICD-10-PCS | Mod: CPTII,S$GLB,, | Performed by: INTERNAL MEDICINE

## 2021-09-22 PROCEDURE — 3074F PR MOST RECENT SYSTOLIC BLOOD PRESSURE < 130 MM HG: ICD-10-PCS | Mod: CPTII,S$GLB,, | Performed by: INTERNAL MEDICINE

## 2021-09-22 PROCEDURE — 1101F PT FALLS ASSESS-DOCD LE1/YR: CPT | Mod: CPTII,S$GLB,, | Performed by: INTERNAL MEDICINE

## 2021-09-22 PROCEDURE — 3074F SYST BP LT 130 MM HG: CPT | Mod: CPTII,S$GLB,, | Performed by: INTERNAL MEDICINE

## 2021-09-22 PROCEDURE — 1126F AMNT PAIN NOTED NONE PRSNT: CPT | Mod: CPTII,S$GLB,, | Performed by: INTERNAL MEDICINE

## 2021-09-22 PROCEDURE — 1160F RVW MEDS BY RX/DR IN RCRD: CPT | Mod: CPTII,S$GLB,, | Performed by: INTERNAL MEDICINE

## 2021-09-22 PROCEDURE — 1159F MED LIST DOCD IN RCRD: CPT | Mod: CPTII,S$GLB,, | Performed by: INTERNAL MEDICINE

## 2021-09-22 PROCEDURE — 1101F PR PT FALLS ASSESS DOC 0-1 FALLS W/OUT INJ PAST YR: ICD-10-PCS | Mod: CPTII,S$GLB,, | Performed by: INTERNAL MEDICINE

## 2021-09-22 PROCEDURE — 1157F ADVNC CARE PLAN IN RCRD: CPT | Mod: CPTII,S$GLB,, | Performed by: INTERNAL MEDICINE

## 2021-09-22 PROCEDURE — 3288F FALL RISK ASSESSMENT DOCD: CPT | Mod: CPTII,S$GLB,, | Performed by: INTERNAL MEDICINE

## 2021-09-23 ENCOUNTER — PATIENT MESSAGE (OUTPATIENT)
Dept: INTERNAL MEDICINE | Facility: CLINIC | Age: 71
End: 2021-09-23

## 2021-09-23 ENCOUNTER — IMMUNIZATION (OUTPATIENT)
Dept: PHARMACY | Facility: CLINIC | Age: 71
End: 2021-09-23
Payer: MEDICARE

## 2021-09-24 ENCOUNTER — IMMUNIZATION (OUTPATIENT)
Dept: PHARMACY | Facility: CLINIC | Age: 71
End: 2021-09-24
Payer: MEDICARE

## 2021-09-25 ENCOUNTER — IMMUNIZATION (OUTPATIENT)
Dept: INTERNAL MEDICINE | Facility: CLINIC | Age: 71
End: 2021-09-25
Payer: COMMERCIAL

## 2021-09-25 DIAGNOSIS — Z23 NEED FOR VACCINATION: Primary | ICD-10-CM

## 2021-09-25 PROCEDURE — 91300 COVID-19, MRNA, LNP-S, PF, 30 MCG/0.3 ML DOSE VACCINE: CPT | Mod: PBBFAC

## 2021-09-25 PROCEDURE — 0003A COVID-19, MRNA, LNP-S, PF, 30 MCG/0.3 ML DOSE VACCINE: CPT | Mod: CV19,PBBFAC

## 2021-09-28 ENCOUNTER — OFFICE VISIT (OUTPATIENT)
Dept: UROLOGY | Facility: CLINIC | Age: 71
End: 2021-09-28
Payer: COMMERCIAL

## 2021-09-28 ENCOUNTER — PATIENT MESSAGE (OUTPATIENT)
Dept: UROLOGY | Facility: CLINIC | Age: 71
End: 2021-09-28

## 2021-09-28 VITALS
HEART RATE: 48 BPM | DIASTOLIC BLOOD PRESSURE: 72 MMHG | WEIGHT: 178.19 LBS | HEIGHT: 68 IN | BODY MASS INDEX: 27.01 KG/M2 | SYSTOLIC BLOOD PRESSURE: 115 MMHG

## 2021-09-28 DIAGNOSIS — N13.8 BPH WITH URINARY OBSTRUCTION: ICD-10-CM

## 2021-09-28 DIAGNOSIS — C61 PROSTATE CANCER: Primary | ICD-10-CM

## 2021-09-28 DIAGNOSIS — N40.1 BPH WITH URINARY OBSTRUCTION: ICD-10-CM

## 2021-09-28 DIAGNOSIS — R97.20 ELEVATED PSA, BETWEEN 10 AND LESS THAN 20 NG/ML: ICD-10-CM

## 2021-09-28 PROCEDURE — 3074F PR MOST RECENT SYSTOLIC BLOOD PRESSURE < 130 MM HG: ICD-10-PCS | Mod: CPTII,S$GLB,, | Performed by: NURSE PRACTITIONER

## 2021-09-28 PROCEDURE — 1100F PTFALLS ASSESS-DOCD GE2>/YR: CPT | Mod: CPTII,S$GLB,, | Performed by: NURSE PRACTITIONER

## 2021-09-28 PROCEDURE — 1126F AMNT PAIN NOTED NONE PRSNT: CPT | Mod: CPTII,S$GLB,, | Performed by: NURSE PRACTITIONER

## 2021-09-28 PROCEDURE — 1100F PR PT FALLS ASSESS DOC 2+ FALLS/FALL W/INJURY/YR: ICD-10-PCS | Mod: CPTII,S$GLB,, | Performed by: NURSE PRACTITIONER

## 2021-09-28 PROCEDURE — 1126F PR PAIN SEVERITY QUANTIFIED, NO PAIN PRESENT: ICD-10-PCS | Mod: CPTII,S$GLB,, | Performed by: NURSE PRACTITIONER

## 2021-09-28 PROCEDURE — 99214 PR OFFICE/OUTPT VISIT, EST, LEVL IV, 30-39 MIN: ICD-10-PCS | Mod: S$GLB,,, | Performed by: NURSE PRACTITIONER

## 2021-09-28 PROCEDURE — 1159F PR MEDICATION LIST DOCUMENTED IN MEDICAL RECORD: ICD-10-PCS | Mod: CPTII,S$GLB,, | Performed by: NURSE PRACTITIONER

## 2021-09-28 PROCEDURE — 3078F PR MOST RECENT DIASTOLIC BLOOD PRESSURE < 80 MM HG: ICD-10-PCS | Mod: CPTII,S$GLB,, | Performed by: NURSE PRACTITIONER

## 2021-09-28 PROCEDURE — 3288F FALL RISK ASSESSMENT DOCD: CPT | Mod: CPTII,S$GLB,, | Performed by: NURSE PRACTITIONER

## 2021-09-28 PROCEDURE — 3288F PR FALLS RISK ASSESSMENT DOCUMENTED: ICD-10-PCS | Mod: CPTII,S$GLB,, | Performed by: NURSE PRACTITIONER

## 2021-09-28 PROCEDURE — 1157F ADVNC CARE PLAN IN RCRD: CPT | Mod: CPTII,S$GLB,, | Performed by: NURSE PRACTITIONER

## 2021-09-28 PROCEDURE — 3008F BODY MASS INDEX DOCD: CPT | Mod: CPTII,S$GLB,, | Performed by: NURSE PRACTITIONER

## 2021-09-28 PROCEDURE — 1159F MED LIST DOCD IN RCRD: CPT | Mod: CPTII,S$GLB,, | Performed by: NURSE PRACTITIONER

## 2021-09-28 PROCEDURE — 99214 OFFICE O/P EST MOD 30 MIN: CPT | Mod: S$GLB,,, | Performed by: NURSE PRACTITIONER

## 2021-09-28 PROCEDURE — 3008F PR BODY MASS INDEX (BMI) DOCUMENTED: ICD-10-PCS | Mod: CPTII,S$GLB,, | Performed by: NURSE PRACTITIONER

## 2021-09-28 PROCEDURE — 3074F SYST BP LT 130 MM HG: CPT | Mod: CPTII,S$GLB,, | Performed by: NURSE PRACTITIONER

## 2021-09-28 PROCEDURE — 99999 PR PBB SHADOW E&M-EST. PATIENT-LVL V: ICD-10-PCS | Mod: PBBFAC,,, | Performed by: NURSE PRACTITIONER

## 2021-09-28 PROCEDURE — 1160F PR REVIEW ALL MEDS BY PRESCRIBER/CLIN PHARMACIST DOCUMENTED: ICD-10-PCS | Mod: CPTII,S$GLB,, | Performed by: NURSE PRACTITIONER

## 2021-09-28 PROCEDURE — 1160F RVW MEDS BY RX/DR IN RCRD: CPT | Mod: CPTII,S$GLB,, | Performed by: NURSE PRACTITIONER

## 2021-09-28 PROCEDURE — 1157F PR ADVANCE CARE PLAN OR EQUIV PRESENT IN MEDICAL RECORD: ICD-10-PCS | Mod: CPTII,S$GLB,, | Performed by: NURSE PRACTITIONER

## 2021-09-28 PROCEDURE — 99999 PR PBB SHADOW E&M-EST. PATIENT-LVL V: CPT | Mod: PBBFAC,,, | Performed by: NURSE PRACTITIONER

## 2021-09-28 PROCEDURE — 3078F DIAST BP <80 MM HG: CPT | Mod: CPTII,S$GLB,, | Performed by: NURSE PRACTITIONER

## 2021-09-28 RX ORDER — PRAVASTATIN SODIUM 40 MG/1
40 TABLET ORAL DAILY
Qty: 90 TABLET | Refills: 3 | Status: SHIPPED | OUTPATIENT
Start: 2021-09-28 | End: 2022-07-11 | Stop reason: SDUPTHER

## 2021-09-29 ENCOUNTER — TELEPHONE (OUTPATIENT)
Dept: PHARMACY | Facility: CLINIC | Age: 71
End: 2021-09-29

## 2021-09-29 ENCOUNTER — PATIENT MESSAGE (OUTPATIENT)
Dept: PHARMACY | Facility: CLINIC | Age: 71
End: 2021-09-29

## 2021-09-30 ENCOUNTER — LAB VISIT (OUTPATIENT)
Dept: LAB | Facility: HOSPITAL | Age: 71
End: 2021-09-30
Attending: INTERNAL MEDICINE
Payer: COMMERCIAL

## 2021-09-30 DIAGNOSIS — E78.5 HYPERLIPIDEMIA, UNSPECIFIED HYPERLIPIDEMIA TYPE: ICD-10-CM

## 2021-09-30 LAB
CHOLEST SERPL-MCNC: 233 MG/DL (ref 120–199)
CHOLEST/HDLC SERPL: 3.5 {RATIO} (ref 2–5)
HDLC SERPL-MCNC: 67 MG/DL (ref 40–75)
HDLC SERPL: 28.8 % (ref 20–50)
LDLC SERPL CALC-MCNC: 136.8 MG/DL (ref 63–159)
NONHDLC SERPL-MCNC: 166 MG/DL
TRIGL SERPL-MCNC: 146 MG/DL (ref 30–150)

## 2021-09-30 PROCEDURE — 80061 LIPID PANEL: CPT | Performed by: INTERNAL MEDICINE

## 2021-09-30 PROCEDURE — 36415 COLL VENOUS BLD VENIPUNCTURE: CPT | Performed by: INTERNAL MEDICINE

## 2021-10-01 ENCOUNTER — TELEPHONE (OUTPATIENT)
Dept: INTERNAL MEDICINE | Facility: CLINIC | Age: 71
End: 2021-10-01

## 2021-10-01 ENCOUNTER — PATIENT MESSAGE (OUTPATIENT)
Dept: INTERNAL MEDICINE | Facility: CLINIC | Age: 71
End: 2021-10-01

## 2021-10-05 ENCOUNTER — PATIENT MESSAGE (OUTPATIENT)
Dept: NEUROLOGY | Facility: CLINIC | Age: 71
End: 2021-10-05

## 2021-10-07 ENCOUNTER — OFFICE VISIT (OUTPATIENT)
Dept: INTERNAL MEDICINE | Facility: CLINIC | Age: 71
End: 2021-10-07
Payer: COMMERCIAL

## 2021-10-07 ENCOUNTER — PATIENT MESSAGE (OUTPATIENT)
Dept: INTERNAL MEDICINE | Facility: CLINIC | Age: 71
End: 2021-10-07

## 2021-10-07 VITALS
HEIGHT: 68 IN | SYSTOLIC BLOOD PRESSURE: 100 MMHG | WEIGHT: 171.31 LBS | DIASTOLIC BLOOD PRESSURE: 60 MMHG | OXYGEN SATURATION: 97 % | HEART RATE: 74 BPM | BODY MASS INDEX: 25.96 KG/M2

## 2021-10-07 DIAGNOSIS — C61 PROSTATE CANCER: ICD-10-CM

## 2021-10-07 DIAGNOSIS — E78.5 HYPERLIPIDEMIA, UNSPECIFIED HYPERLIPIDEMIA TYPE: ICD-10-CM

## 2021-10-07 DIAGNOSIS — Q99.9 GENETIC DISORDER: ICD-10-CM

## 2021-10-07 DIAGNOSIS — Z00.00 WELLNESS EXAMINATION: Primary | ICD-10-CM

## 2021-10-07 PROCEDURE — 3074F SYST BP LT 130 MM HG: CPT | Mod: CPTII,S$GLB,, | Performed by: INTERNAL MEDICINE

## 2021-10-07 PROCEDURE — 3288F FALL RISK ASSESSMENT DOCD: CPT | Mod: CPTII,S$GLB,, | Performed by: INTERNAL MEDICINE

## 2021-10-07 PROCEDURE — 3008F BODY MASS INDEX DOCD: CPT | Mod: CPTII,S$GLB,, | Performed by: INTERNAL MEDICINE

## 2021-10-07 PROCEDURE — 3288F PR FALLS RISK ASSESSMENT DOCUMENTED: ICD-10-PCS | Mod: CPTII,S$GLB,, | Performed by: INTERNAL MEDICINE

## 2021-10-07 PROCEDURE — 99397 PR PREVENTIVE VISIT,EST,65 & OVER: ICD-10-PCS | Mod: S$GLB,,, | Performed by: INTERNAL MEDICINE

## 2021-10-07 PROCEDURE — 1159F PR MEDICATION LIST DOCUMENTED IN MEDICAL RECORD: ICD-10-PCS | Mod: CPTII,S$GLB,, | Performed by: INTERNAL MEDICINE

## 2021-10-07 PROCEDURE — 1157F ADVNC CARE PLAN IN RCRD: CPT | Mod: CPTII,S$GLB,, | Performed by: INTERNAL MEDICINE

## 2021-10-07 PROCEDURE — 99397 PER PM REEVAL EST PAT 65+ YR: CPT | Mod: S$GLB,,, | Performed by: INTERNAL MEDICINE

## 2021-10-07 PROCEDURE — 1100F PTFALLS ASSESS-DOCD GE2>/YR: CPT | Mod: CPTII,S$GLB,, | Performed by: INTERNAL MEDICINE

## 2021-10-07 PROCEDURE — 3008F PR BODY MASS INDEX (BMI) DOCUMENTED: ICD-10-PCS | Mod: CPTII,S$GLB,, | Performed by: INTERNAL MEDICINE

## 2021-10-07 PROCEDURE — 1100F PR PT FALLS ASSESS DOC 2+ FALLS/FALL W/INJURY/YR: ICD-10-PCS | Mod: CPTII,S$GLB,, | Performed by: INTERNAL MEDICINE

## 2021-10-07 PROCEDURE — 99999 PR PBB SHADOW E&M-EST. PATIENT-LVL V: ICD-10-PCS | Mod: PBBFAC,,, | Performed by: INTERNAL MEDICINE

## 2021-10-07 PROCEDURE — 1159F MED LIST DOCD IN RCRD: CPT | Mod: CPTII,S$GLB,, | Performed by: INTERNAL MEDICINE

## 2021-10-07 PROCEDURE — 1125F AMNT PAIN NOTED PAIN PRSNT: CPT | Mod: CPTII,S$GLB,, | Performed by: INTERNAL MEDICINE

## 2021-10-07 PROCEDURE — 3074F PR MOST RECENT SYSTOLIC BLOOD PRESSURE < 130 MM HG: ICD-10-PCS | Mod: CPTII,S$GLB,, | Performed by: INTERNAL MEDICINE

## 2021-10-07 PROCEDURE — 1125F PR PAIN SEVERITY QUANTIFIED, PAIN PRESENT: ICD-10-PCS | Mod: CPTII,S$GLB,, | Performed by: INTERNAL MEDICINE

## 2021-10-07 PROCEDURE — 3078F DIAST BP <80 MM HG: CPT | Mod: CPTII,S$GLB,, | Performed by: INTERNAL MEDICINE

## 2021-10-07 PROCEDURE — 1157F PR ADVANCE CARE PLAN OR EQUIV PRESENT IN MEDICAL RECORD: ICD-10-PCS | Mod: CPTII,S$GLB,, | Performed by: INTERNAL MEDICINE

## 2021-10-07 PROCEDURE — 3078F PR MOST RECENT DIASTOLIC BLOOD PRESSURE < 80 MM HG: ICD-10-PCS | Mod: CPTII,S$GLB,, | Performed by: INTERNAL MEDICINE

## 2021-10-07 PROCEDURE — 99999 PR PBB SHADOW E&M-EST. PATIENT-LVL V: CPT | Mod: PBBFAC,,, | Performed by: INTERNAL MEDICINE

## 2021-10-08 ENCOUNTER — PATIENT MESSAGE (OUTPATIENT)
Dept: NEUROLOGY | Facility: CLINIC | Age: 71
End: 2021-10-08

## 2021-10-08 ENCOUNTER — HOSPITAL ENCOUNTER (OUTPATIENT)
Dept: VASCULAR SURGERY | Facility: CLINIC | Age: 71
Discharge: HOME | End: 2021-10-08
Attending: INTERNAL MEDICINE
Payer: COMMERCIAL

## 2021-10-08 DIAGNOSIS — I65.23 BILATERAL CAROTID ARTERY STENOSIS: ICD-10-CM

## 2021-10-08 DIAGNOSIS — E78.5 HYPERLIPIDEMIA, UNSPECIFIED HYPERLIPIDEMIA TYPE: ICD-10-CM

## 2021-10-08 PROCEDURE — 93880 EXTRACRANIAL BILAT STUDY: CPT | Mod: S$GLB,,, | Performed by: SURGERY

## 2021-10-08 PROCEDURE — 93880 PR DUPLEX SCAN EXTRACRANIAL,BILAT: ICD-10-PCS | Mod: S$GLB,,, | Performed by: SURGERY

## 2021-10-18 ENCOUNTER — OFFICE VISIT (OUTPATIENT)
Dept: RADIATION ONCOLOGY | Facility: CLINIC | Age: 71
End: 2021-10-18
Attending: RADIOLOGY
Payer: COMMERCIAL

## 2021-10-18 VITALS
HEART RATE: 70 BPM | HEIGHT: 68 IN | SYSTOLIC BLOOD PRESSURE: 134 MMHG | DIASTOLIC BLOOD PRESSURE: 68 MMHG | BODY MASS INDEX: 26.98 KG/M2 | WEIGHT: 178 LBS | RESPIRATION RATE: 18 BRPM

## 2021-10-18 DIAGNOSIS — C61 PROSTATE CANCER: Primary | ICD-10-CM

## 2021-10-18 PROCEDURE — 3078F PR MOST RECENT DIASTOLIC BLOOD PRESSURE < 80 MM HG: ICD-10-PCS | Mod: CPTII,S$GLB,, | Performed by: RADIOLOGY

## 2021-10-18 PROCEDURE — 99999 PR PBB SHADOW E&M-EST. PATIENT-LVL V: CPT | Mod: PBBFAC,,, | Performed by: RADIOLOGY

## 2021-10-18 PROCEDURE — 1157F ADVNC CARE PLAN IN RCRD: CPT | Mod: CPTII,S$GLB,, | Performed by: RADIOLOGY

## 2021-10-18 PROCEDURE — 1125F AMNT PAIN NOTED PAIN PRSNT: CPT | Mod: CPTII,S$GLB,, | Performed by: RADIOLOGY

## 2021-10-18 PROCEDURE — 1160F PR REVIEW ALL MEDS BY PRESCRIBER/CLIN PHARMACIST DOCUMENTED: ICD-10-PCS | Mod: CPTII,S$GLB,, | Performed by: RADIOLOGY

## 2021-10-18 PROCEDURE — 3078F DIAST BP <80 MM HG: CPT | Mod: CPTII,S$GLB,, | Performed by: RADIOLOGY

## 2021-10-18 PROCEDURE — 1100F PR PT FALLS ASSESS DOC 2+ FALLS/FALL W/INJURY/YR: ICD-10-PCS | Mod: CPTII,S$GLB,, | Performed by: RADIOLOGY

## 2021-10-18 PROCEDURE — 3288F PR FALLS RISK ASSESSMENT DOCUMENTED: ICD-10-PCS | Mod: CPTII,S$GLB,, | Performed by: RADIOLOGY

## 2021-10-18 PROCEDURE — 1100F PTFALLS ASSESS-DOCD GE2>/YR: CPT | Mod: CPTII,S$GLB,, | Performed by: RADIOLOGY

## 2021-10-18 PROCEDURE — 1160F RVW MEDS BY RX/DR IN RCRD: CPT | Mod: CPTII,S$GLB,, | Performed by: RADIOLOGY

## 2021-10-18 PROCEDURE — 1157F PR ADVANCE CARE PLAN OR EQUIV PRESENT IN MEDICAL RECORD: ICD-10-PCS | Mod: CPTII,S$GLB,, | Performed by: RADIOLOGY

## 2021-10-18 PROCEDURE — 3008F BODY MASS INDEX DOCD: CPT | Mod: CPTII,S$GLB,, | Performed by: RADIOLOGY

## 2021-10-18 PROCEDURE — 3008F PR BODY MASS INDEX (BMI) DOCUMENTED: ICD-10-PCS | Mod: CPTII,S$GLB,, | Performed by: RADIOLOGY

## 2021-10-18 PROCEDURE — 3075F SYST BP GE 130 - 139MM HG: CPT | Mod: CPTII,S$GLB,, | Performed by: RADIOLOGY

## 2021-10-18 PROCEDURE — 99212 OFFICE O/P EST SF 10 MIN: CPT | Mod: S$GLB,,, | Performed by: RADIOLOGY

## 2021-10-18 PROCEDURE — 3288F FALL RISK ASSESSMENT DOCD: CPT | Mod: CPTII,S$GLB,, | Performed by: RADIOLOGY

## 2021-10-18 PROCEDURE — 99212 PR OFFICE/OUTPT VISIT, EST, LEVL II, 10-19 MIN: ICD-10-PCS | Mod: S$GLB,,, | Performed by: RADIOLOGY

## 2021-10-18 PROCEDURE — 1125F PR PAIN SEVERITY QUANTIFIED, PAIN PRESENT: ICD-10-PCS | Mod: CPTII,S$GLB,, | Performed by: RADIOLOGY

## 2021-10-18 PROCEDURE — 3075F PR MOST RECENT SYSTOLIC BLOOD PRESS GE 130-139MM HG: ICD-10-PCS | Mod: CPTII,S$GLB,, | Performed by: RADIOLOGY

## 2021-10-18 PROCEDURE — 1159F PR MEDICATION LIST DOCUMENTED IN MEDICAL RECORD: ICD-10-PCS | Mod: CPTII,S$GLB,, | Performed by: RADIOLOGY

## 2021-10-18 PROCEDURE — 99999 PR PBB SHADOW E&M-EST. PATIENT-LVL V: ICD-10-PCS | Mod: PBBFAC,,, | Performed by: RADIOLOGY

## 2021-10-18 PROCEDURE — 1159F MED LIST DOCD IN RCRD: CPT | Mod: CPTII,S$GLB,, | Performed by: RADIOLOGY

## 2021-10-21 ENCOUNTER — TELEPHONE (OUTPATIENT)
Dept: INTERNAL MEDICINE | Facility: CLINIC | Age: 71
End: 2021-10-21

## 2021-10-21 ENCOUNTER — PATIENT MESSAGE (OUTPATIENT)
Dept: INTERNAL MEDICINE | Facility: CLINIC | Age: 71
End: 2021-10-21

## 2021-10-25 ENCOUNTER — PATIENT MESSAGE (OUTPATIENT)
Dept: GENETICS | Facility: CLINIC | Age: 71
End: 2021-10-25
Payer: MEDICARE

## 2021-10-25 ENCOUNTER — PATIENT MESSAGE (OUTPATIENT)
Dept: NEUROLOGY | Facility: CLINIC | Age: 71
End: 2021-10-25
Payer: MEDICARE

## 2021-11-09 ENCOUNTER — TELEPHONE (OUTPATIENT)
Dept: ORTHOPEDICS | Facility: CLINIC | Age: 71
End: 2021-11-09
Payer: MEDICARE

## 2021-11-10 ENCOUNTER — OFFICE VISIT (OUTPATIENT)
Dept: ORTHOPEDICS | Facility: CLINIC | Age: 71
End: 2021-11-10
Payer: COMMERCIAL

## 2021-11-10 VITALS — HEIGHT: 68 IN | BODY MASS INDEX: 26.98 KG/M2 | WEIGHT: 178 LBS

## 2021-11-10 DIAGNOSIS — G89.29 CHRONIC PAIN OF LEFT HAND: ICD-10-CM

## 2021-11-10 DIAGNOSIS — G60.0 CHARCOT-MARIE-TOOTH DISEASE: ICD-10-CM

## 2021-11-10 DIAGNOSIS — M19.042 PRIMARY OSTEOARTHRITIS OF BOTH HANDS: Primary | ICD-10-CM

## 2021-11-10 DIAGNOSIS — M19.041 PRIMARY OSTEOARTHRITIS OF BOTH HANDS: Primary | ICD-10-CM

## 2021-11-10 DIAGNOSIS — M79.642 CHRONIC PAIN OF LEFT HAND: ICD-10-CM

## 2021-11-10 PROCEDURE — 1159F PR MEDICATION LIST DOCUMENTED IN MEDICAL RECORD: ICD-10-PCS | Mod: CPTII,S$GLB,, | Performed by: ORTHOPAEDIC SURGERY

## 2021-11-10 PROCEDURE — 3008F PR BODY MASS INDEX (BMI) DOCUMENTED: ICD-10-PCS | Mod: CPTII,S$GLB,, | Performed by: ORTHOPAEDIC SURGERY

## 2021-11-10 PROCEDURE — 1157F ADVNC CARE PLAN IN RCRD: CPT | Mod: CPTII,S$GLB,, | Performed by: ORTHOPAEDIC SURGERY

## 2021-11-10 PROCEDURE — 99214 PR OFFICE/OUTPT VISIT, EST, LEVL IV, 30-39 MIN: ICD-10-PCS | Mod: S$GLB,,, | Performed by: ORTHOPAEDIC SURGERY

## 2021-11-10 PROCEDURE — 1160F PR REVIEW ALL MEDS BY PRESCRIBER/CLIN PHARMACIST DOCUMENTED: ICD-10-PCS | Mod: CPTII,S$GLB,, | Performed by: ORTHOPAEDIC SURGERY

## 2021-11-10 PROCEDURE — 3288F FALL RISK ASSESSMENT DOCD: CPT | Mod: CPTII,S$GLB,, | Performed by: ORTHOPAEDIC SURGERY

## 2021-11-10 PROCEDURE — 1125F PR PAIN SEVERITY QUANTIFIED, PAIN PRESENT: ICD-10-PCS | Mod: CPTII,S$GLB,, | Performed by: ORTHOPAEDIC SURGERY

## 2021-11-10 PROCEDURE — 1101F PR PT FALLS ASSESS DOC 0-1 FALLS W/OUT INJ PAST YR: ICD-10-PCS | Mod: CPTII,S$GLB,, | Performed by: ORTHOPAEDIC SURGERY

## 2021-11-10 PROCEDURE — 1101F PT FALLS ASSESS-DOCD LE1/YR: CPT | Mod: CPTII,S$GLB,, | Performed by: ORTHOPAEDIC SURGERY

## 2021-11-10 PROCEDURE — 1160F RVW MEDS BY RX/DR IN RCRD: CPT | Mod: CPTII,S$GLB,, | Performed by: ORTHOPAEDIC SURGERY

## 2021-11-10 PROCEDURE — 1157F PR ADVANCE CARE PLAN OR EQUIV PRESENT IN MEDICAL RECORD: ICD-10-PCS | Mod: CPTII,S$GLB,, | Performed by: ORTHOPAEDIC SURGERY

## 2021-11-10 PROCEDURE — 3288F PR FALLS RISK ASSESSMENT DOCUMENTED: ICD-10-PCS | Mod: CPTII,S$GLB,, | Performed by: ORTHOPAEDIC SURGERY

## 2021-11-10 PROCEDURE — 99214 OFFICE O/P EST MOD 30 MIN: CPT | Mod: S$GLB,,, | Performed by: ORTHOPAEDIC SURGERY

## 2021-11-10 PROCEDURE — 3008F BODY MASS INDEX DOCD: CPT | Mod: CPTII,S$GLB,, | Performed by: ORTHOPAEDIC SURGERY

## 2021-11-10 PROCEDURE — 99999 PR PBB SHADOW E&M-EST. PATIENT-LVL IV: CPT | Mod: PBBFAC,,, | Performed by: ORTHOPAEDIC SURGERY

## 2021-11-10 PROCEDURE — 1159F MED LIST DOCD IN RCRD: CPT | Mod: CPTII,S$GLB,, | Performed by: ORTHOPAEDIC SURGERY

## 2021-11-10 PROCEDURE — 99999 PR PBB SHADOW E&M-EST. PATIENT-LVL IV: ICD-10-PCS | Mod: PBBFAC,,, | Performed by: ORTHOPAEDIC SURGERY

## 2021-11-10 PROCEDURE — 1125F AMNT PAIN NOTED PAIN PRSNT: CPT | Mod: CPTII,S$GLB,, | Performed by: ORTHOPAEDIC SURGERY

## 2021-11-15 ENCOUNTER — TELEPHONE (OUTPATIENT)
Dept: ORTHOPEDICS | Facility: CLINIC | Age: 71
End: 2021-11-15
Payer: MEDICARE

## 2021-11-15 ENCOUNTER — PATIENT MESSAGE (OUTPATIENT)
Dept: ORTHOPEDICS | Facility: CLINIC | Age: 71
End: 2021-11-15
Payer: MEDICARE

## 2021-11-19 ENCOUNTER — TELEPHONE (OUTPATIENT)
Dept: ORTHOPEDICS | Facility: CLINIC | Age: 71
End: 2021-11-19
Payer: MEDICARE

## 2021-12-01 ENCOUNTER — PATIENT MESSAGE (OUTPATIENT)
Dept: ORTHOPEDICS | Facility: CLINIC | Age: 71
End: 2021-12-01
Payer: MEDICARE

## 2021-12-01 DIAGNOSIS — G60.0 CHARCOT-MARIE-TOOTH DISEASE: Primary | ICD-10-CM

## 2021-12-02 ENCOUNTER — PATIENT MESSAGE (OUTPATIENT)
Dept: NEUROLOGY | Facility: CLINIC | Age: 71
End: 2021-12-02
Payer: MEDICARE

## 2021-12-02 DIAGNOSIS — G89.29 CHRONIC PAIN OF LEFT HAND: Primary | ICD-10-CM

## 2021-12-02 DIAGNOSIS — G43.909 MIGRAINE WITHOUT STATUS MIGRAINOSUS, NOT INTRACTABLE, UNSPECIFIED MIGRAINE TYPE: Primary | ICD-10-CM

## 2021-12-02 DIAGNOSIS — M79.642 CHRONIC PAIN OF LEFT HAND: Primary | ICD-10-CM

## 2021-12-02 RX ORDER — RIMEGEPANT SULFATE 75 MG/75MG
75 TABLET, ORALLY DISINTEGRATING ORAL EVERY OTHER DAY
Qty: 16 TABLET | Refills: 2 | Status: SHIPPED | OUTPATIENT
Start: 2021-12-02 | End: 2022-10-24

## 2021-12-07 ENCOUNTER — CLINICAL SUPPORT (OUTPATIENT)
Dept: REHABILITATION | Facility: HOSPITAL | Age: 71
End: 2021-12-07
Attending: ORTHOPAEDIC SURGERY
Payer: COMMERCIAL

## 2021-12-07 ENCOUNTER — OFFICE VISIT (OUTPATIENT)
Dept: INTERNAL MEDICINE | Facility: CLINIC | Age: 71
End: 2021-12-07
Payer: COMMERCIAL

## 2021-12-07 VITALS
BODY MASS INDEX: 27.26 KG/M2 | SYSTOLIC BLOOD PRESSURE: 110 MMHG | WEIGHT: 179.88 LBS | HEIGHT: 68 IN | HEART RATE: 58 BPM | DIASTOLIC BLOOD PRESSURE: 74 MMHG | OXYGEN SATURATION: 97 %

## 2021-12-07 DIAGNOSIS — G56.02 CARPAL TUNNEL SYNDROME OF LEFT WRIST: ICD-10-CM

## 2021-12-07 DIAGNOSIS — Z76.89 ENCOUNTER TO ESTABLISH CARE: Primary | ICD-10-CM

## 2021-12-07 DIAGNOSIS — Z98.1 S/P LUMBAR SPINAL FUSION: ICD-10-CM

## 2021-12-07 DIAGNOSIS — M54.50 CHRONIC LOW BACK PAIN, UNSPECIFIED BACK PAIN LATERALITY, UNSPECIFIED WHETHER SCIATICA PRESENT: ICD-10-CM

## 2021-12-07 DIAGNOSIS — G95.89 OTHER SPECIFIED DISEASES OF SPINAL CORD: ICD-10-CM

## 2021-12-07 DIAGNOSIS — H93.13 TINNITUS OF BOTH EARS: ICD-10-CM

## 2021-12-07 DIAGNOSIS — M54.2 CHRONIC NECK PAIN: ICD-10-CM

## 2021-12-07 DIAGNOSIS — F41.9 ANXIETY: ICD-10-CM

## 2021-12-07 DIAGNOSIS — G89.29 CHRONIC LOW BACK PAIN, UNSPECIFIED BACK PAIN LATERALITY, UNSPECIFIED WHETHER SCIATICA PRESENT: ICD-10-CM

## 2021-12-07 DIAGNOSIS — M79.642 CHRONIC PAIN OF LEFT HAND: Primary | ICD-10-CM

## 2021-12-07 DIAGNOSIS — C61 PROSTATE CANCER: ICD-10-CM

## 2021-12-07 DIAGNOSIS — G89.29 CHRONIC PAIN OF LEFT HAND: Primary | ICD-10-CM

## 2021-12-07 DIAGNOSIS — M47.812 CERVICAL SPONDYLOSIS: ICD-10-CM

## 2021-12-07 DIAGNOSIS — H61.21 IMPACTED CERUMEN OF RIGHT EAR: ICD-10-CM

## 2021-12-07 DIAGNOSIS — G89.29 CHRONIC NECK PAIN: ICD-10-CM

## 2021-12-07 PROCEDURE — 97813 ACUP 1/> W/ESTIM 1ST 15 MIN: CPT | Performed by: ACUPUNCTURIST

## 2021-12-07 PROCEDURE — 99214 OFFICE O/P EST MOD 30 MIN: CPT | Mod: S$GLB,,, | Performed by: INTERNAL MEDICINE

## 2021-12-07 PROCEDURE — 99214 PR OFFICE/OUTPT VISIT, EST, LEVL IV, 30-39 MIN: ICD-10-PCS | Mod: S$GLB,,, | Performed by: INTERNAL MEDICINE

## 2021-12-07 PROCEDURE — 97814 ACUP 1/> W/ESTIM EA ADDL 15: CPT | Performed by: ACUPUNCTURIST

## 2021-12-07 PROCEDURE — 1157F PR ADVANCE CARE PLAN OR EQUIV PRESENT IN MEDICAL RECORD: ICD-10-PCS | Mod: CPTII,S$GLB,, | Performed by: INTERNAL MEDICINE

## 2021-12-07 PROCEDURE — 1157F ADVNC CARE PLAN IN RCRD: CPT | Mod: CPTII,S$GLB,, | Performed by: INTERNAL MEDICINE

## 2021-12-07 PROCEDURE — 99999 PR PBB SHADOW E&M-EST. PATIENT-LVL V: ICD-10-PCS | Mod: PBBFAC,,, | Performed by: INTERNAL MEDICINE

## 2021-12-07 PROCEDURE — 99999 PR PBB SHADOW E&M-EST. PATIENT-LVL V: CPT | Mod: PBBFAC,,, | Performed by: INTERNAL MEDICINE

## 2021-12-14 ENCOUNTER — CLINICAL SUPPORT (OUTPATIENT)
Dept: REHABILITATION | Facility: HOSPITAL | Age: 71
End: 2021-12-14
Attending: ORTHOPAEDIC SURGERY
Payer: COMMERCIAL

## 2021-12-14 DIAGNOSIS — G89.29 CHRONIC PAIN OF LEFT HAND: Primary | ICD-10-CM

## 2021-12-14 DIAGNOSIS — M21.379 FOOT-DROP, UNSPECIFIED LATERALITY: ICD-10-CM

## 2021-12-14 DIAGNOSIS — M79.642 CHRONIC PAIN OF LEFT HAND: Primary | ICD-10-CM

## 2021-12-14 PROCEDURE — 97813 ACUP 1/> W/ESTIM 1ST 15 MIN: CPT | Performed by: ACUPUNCTURIST

## 2021-12-14 PROCEDURE — 97814 ACUP 1/> W/ESTIM EA ADDL 15: CPT | Performed by: ACUPUNCTURIST

## 2021-12-15 ENCOUNTER — PATIENT MESSAGE (OUTPATIENT)
Dept: OTOLARYNGOLOGY | Facility: CLINIC | Age: 71
End: 2021-12-15
Payer: MEDICARE

## 2021-12-15 ENCOUNTER — TELEPHONE (OUTPATIENT)
Dept: OTOLARYNGOLOGY | Facility: CLINIC | Age: 71
End: 2021-12-15
Payer: MEDICARE

## 2021-12-16 ENCOUNTER — LAB VISIT (OUTPATIENT)
Dept: LAB | Facility: HOSPITAL | Age: 71
End: 2021-12-16
Payer: COMMERCIAL

## 2021-12-16 ENCOUNTER — PATIENT MESSAGE (OUTPATIENT)
Dept: HEMATOLOGY/ONCOLOGY | Facility: CLINIC | Age: 71
End: 2021-12-16

## 2021-12-16 ENCOUNTER — OFFICE VISIT (OUTPATIENT)
Dept: HEMATOLOGY/ONCOLOGY | Facility: CLINIC | Age: 71
End: 2021-12-16
Payer: COMMERCIAL

## 2021-12-16 DIAGNOSIS — Z80.0 FAMILY HISTORY OF COLON CANCER: ICD-10-CM

## 2021-12-16 DIAGNOSIS — C61 PROSTATE CANCER: ICD-10-CM

## 2021-12-16 DIAGNOSIS — Z80.49 FAMILY HISTORY OF UTERINE CANCER: ICD-10-CM

## 2021-12-16 DIAGNOSIS — Z86.010 HISTORY OF COLONIC POLYPS: ICD-10-CM

## 2021-12-16 DIAGNOSIS — Z71.83 ENCOUNTER FOR NONPROCREATIVE GENETIC COUNSELING: Primary | ICD-10-CM

## 2021-12-16 PROCEDURE — 99215 PR OFFICE/OUTPT VISIT, EST, LEVL V, 40-54 MIN: ICD-10-PCS | Mod: S$GLB,,, | Performed by: NURSE PRACTITIONER

## 2021-12-16 PROCEDURE — 99215 OFFICE O/P EST HI 40 MIN: CPT | Mod: S$GLB,,, | Performed by: NURSE PRACTITIONER

## 2021-12-16 PROCEDURE — 1157F PR ADVANCE CARE PLAN OR EQUIV PRESENT IN MEDICAL RECORD: ICD-10-PCS | Mod: CPTII,S$GLB,, | Performed by: NURSE PRACTITIONER

## 2021-12-16 PROCEDURE — 99999 PR PBB SHADOW E&M-EST. PATIENT-LVL III: CPT | Mod: PBBFAC,,, | Performed by: NURSE PRACTITIONER

## 2021-12-16 PROCEDURE — 1157F ADVNC CARE PLAN IN RCRD: CPT | Mod: CPTII,S$GLB,, | Performed by: NURSE PRACTITIONER

## 2021-12-16 PROCEDURE — 36415 COLL VENOUS BLD VENIPUNCTURE: CPT | Performed by: NURSE PRACTITIONER

## 2021-12-16 PROCEDURE — 99999 PR PBB SHADOW E&M-EST. PATIENT-LVL III: ICD-10-PCS | Mod: PBBFAC,,, | Performed by: NURSE PRACTITIONER

## 2021-12-20 ENCOUNTER — PATIENT MESSAGE (OUTPATIENT)
Dept: ORTHOPEDICS | Facility: CLINIC | Age: 71
End: 2021-12-20
Payer: MEDICARE

## 2021-12-20 ENCOUNTER — PATIENT MESSAGE (OUTPATIENT)
Dept: NEUROLOGY | Facility: CLINIC | Age: 71
End: 2021-12-20
Payer: MEDICARE

## 2021-12-20 ENCOUNTER — PATIENT OUTREACH (OUTPATIENT)
Dept: ADMINISTRATIVE | Facility: OTHER | Age: 71
End: 2021-12-20
Payer: MEDICARE

## 2021-12-21 ENCOUNTER — OFFICE VISIT (OUTPATIENT)
Dept: ORTHOPEDICS | Facility: CLINIC | Age: 71
End: 2021-12-21
Payer: COMMERCIAL

## 2021-12-21 ENCOUNTER — CLINICAL SUPPORT (OUTPATIENT)
Dept: REHABILITATION | Facility: HOSPITAL | Age: 71
End: 2021-12-21
Attending: ORTHOPAEDIC SURGERY
Payer: COMMERCIAL

## 2021-12-21 VITALS — BODY MASS INDEX: 26.37 KG/M2 | WEIGHT: 174 LBS | HEIGHT: 68 IN

## 2021-12-21 DIAGNOSIS — M19.042 PRIMARY OSTEOARTHRITIS OF BOTH HANDS: ICD-10-CM

## 2021-12-21 DIAGNOSIS — M19.041 PRIMARY OSTEOARTHRITIS OF BOTH HANDS: ICD-10-CM

## 2021-12-21 DIAGNOSIS — M77.8 TENDONITIS OF FINGER: ICD-10-CM

## 2021-12-21 DIAGNOSIS — M77.8 LEFT WRIST TENDONITIS: ICD-10-CM

## 2021-12-21 DIAGNOSIS — G89.29 CHRONIC BILATERAL LOW BACK PAIN, UNSPECIFIED WHETHER SCIATICA PRESENT: Primary | ICD-10-CM

## 2021-12-21 DIAGNOSIS — M54.50 CHRONIC BILATERAL LOW BACK PAIN, UNSPECIFIED WHETHER SCIATICA PRESENT: Primary | ICD-10-CM

## 2021-12-21 DIAGNOSIS — G60.0 CHARCOT-MARIE-TOOTH DISEASE: ICD-10-CM

## 2021-12-21 DIAGNOSIS — M19.032 ARTHRITIS OF WRIST, LEFT: Primary | ICD-10-CM

## 2021-12-21 PROCEDURE — 1157F PR ADVANCE CARE PLAN OR EQUIV PRESENT IN MEDICAL RECORD: ICD-10-PCS | Mod: CPTII,S$GLB,, | Performed by: ORTHOPAEDIC SURGERY

## 2021-12-21 PROCEDURE — 97813 ACUP 1/> W/ESTIM 1ST 15 MIN: CPT | Performed by: ACUPUNCTURIST

## 2021-12-21 PROCEDURE — 97814 ACUP 1/> W/ESTIM EA ADDL 15: CPT | Performed by: ACUPUNCTURIST

## 2021-12-21 PROCEDURE — 1157F ADVNC CARE PLAN IN RCRD: CPT | Mod: CPTII,S$GLB,, | Performed by: ORTHOPAEDIC SURGERY

## 2021-12-21 PROCEDURE — 99999 PR PBB SHADOW E&M-EST. PATIENT-LVL IV: CPT | Mod: PBBFAC,,, | Performed by: ORTHOPAEDIC SURGERY

## 2021-12-21 PROCEDURE — 99999 PR PBB SHADOW E&M-EST. PATIENT-LVL IV: ICD-10-PCS | Mod: PBBFAC,,, | Performed by: ORTHOPAEDIC SURGERY

## 2021-12-21 PROCEDURE — 99214 OFFICE O/P EST MOD 30 MIN: CPT | Mod: S$GLB,,, | Performed by: ORTHOPAEDIC SURGERY

## 2021-12-21 PROCEDURE — 99214 PR OFFICE/OUTPT VISIT, EST, LEVL IV, 30-39 MIN: ICD-10-PCS | Mod: S$GLB,,, | Performed by: ORTHOPAEDIC SURGERY

## 2021-12-21 RX ORDER — PREGABALIN 25 MG/1
50 CAPSULE ORAL DAILY
COMMUNITY
End: 2022-03-16

## 2021-12-21 RX ORDER — CELECOXIB 100 MG/1
100 CAPSULE ORAL 2 TIMES DAILY
COMMUNITY
Start: 2021-12-12 | End: 2022-03-16

## 2021-12-21 RX ORDER — TRIAMCINOLONE ACETONIDE 1 MG/G
PASTE DENTAL
COMMUNITY
Start: 2021-12-12 | End: 2022-03-16

## 2021-12-22 ENCOUNTER — OFFICE VISIT (OUTPATIENT)
Dept: PSYCHIATRY | Facility: CLINIC | Age: 71
End: 2021-12-22
Payer: COMMERCIAL

## 2021-12-22 DIAGNOSIS — F32.A DEPRESSION, UNSPECIFIED DEPRESSION TYPE: ICD-10-CM

## 2021-12-22 DIAGNOSIS — F43.21 ADJUSTMENT DISORDER WITH DEPRESSED MOOD: Primary | ICD-10-CM

## 2021-12-22 PROCEDURE — 1157F ADVNC CARE PLAN IN RCRD: CPT | Mod: CPTII,95,, | Performed by: PSYCHOLOGIST

## 2021-12-22 PROCEDURE — 1157F PR ADVANCE CARE PLAN OR EQUIV PRESENT IN MEDICAL RECORD: ICD-10-PCS | Mod: CPTII,95,, | Performed by: PSYCHOLOGIST

## 2021-12-22 PROCEDURE — 90791 PR PSYCHIATRIC DIAGNOSTIC EVALUATION: ICD-10-PCS | Mod: 95,,, | Performed by: PSYCHOLOGIST

## 2021-12-22 PROCEDURE — 90791 PSYCH DIAGNOSTIC EVALUATION: CPT | Mod: 95,,, | Performed by: PSYCHOLOGIST

## 2021-12-23 ENCOUNTER — OFFICE VISIT (OUTPATIENT)
Dept: OTOLARYNGOLOGY | Facility: CLINIC | Age: 71
End: 2021-12-23
Payer: COMMERCIAL

## 2021-12-23 VITALS — DIASTOLIC BLOOD PRESSURE: 69 MMHG | SYSTOLIC BLOOD PRESSURE: 100 MMHG | HEART RATE: 52 BPM

## 2021-12-23 DIAGNOSIS — Z78.9 HISTORY OF EXCESSIVE CERUMEN: Primary | ICD-10-CM

## 2021-12-23 DIAGNOSIS — H61.23 IMPACTED CERUMEN, BILATERAL: ICD-10-CM

## 2021-12-23 PROCEDURE — 3288F FALL RISK ASSESSMENT DOCD: CPT | Mod: CPTII,S$GLB,, | Performed by: OTOLARYNGOLOGY

## 2021-12-23 PROCEDURE — 99999 PR PBB SHADOW E&M-EST. PATIENT-LVL III: ICD-10-PCS | Mod: PBBFAC,,, | Performed by: OTOLARYNGOLOGY

## 2021-12-23 PROCEDURE — 1159F MED LIST DOCD IN RCRD: CPT | Mod: CPTII,S$GLB,, | Performed by: OTOLARYNGOLOGY

## 2021-12-23 PROCEDURE — 1157F ADVNC CARE PLAN IN RCRD: CPT | Mod: CPTII,S$GLB,, | Performed by: OTOLARYNGOLOGY

## 2021-12-23 PROCEDURE — 3078F PR MOST RECENT DIASTOLIC BLOOD PRESSURE < 80 MM HG: ICD-10-PCS | Mod: CPTII,S$GLB,, | Performed by: OTOLARYNGOLOGY

## 2021-12-23 PROCEDURE — 99999 PR PBB SHADOW E&M-EST. PATIENT-LVL III: CPT | Mod: PBBFAC,,, | Performed by: OTOLARYNGOLOGY

## 2021-12-23 PROCEDURE — 3074F SYST BP LT 130 MM HG: CPT | Mod: CPTII,S$GLB,, | Performed by: OTOLARYNGOLOGY

## 2021-12-23 PROCEDURE — 69210 REMOVE IMPACTED EAR WAX UNI: CPT | Mod: S$GLB,,, | Performed by: OTOLARYNGOLOGY

## 2021-12-23 PROCEDURE — 99212 OFFICE O/P EST SF 10 MIN: CPT | Mod: 25,S$GLB,, | Performed by: OTOLARYNGOLOGY

## 2021-12-23 PROCEDURE — 3078F DIAST BP <80 MM HG: CPT | Mod: CPTII,S$GLB,, | Performed by: OTOLARYNGOLOGY

## 2021-12-23 PROCEDURE — 3074F PR MOST RECENT SYSTOLIC BLOOD PRESSURE < 130 MM HG: ICD-10-PCS | Mod: CPTII,S$GLB,, | Performed by: OTOLARYNGOLOGY

## 2021-12-23 PROCEDURE — 1101F PR PT FALLS ASSESS DOC 0-1 FALLS W/OUT INJ PAST YR: ICD-10-PCS | Mod: CPTII,S$GLB,, | Performed by: OTOLARYNGOLOGY

## 2021-12-23 PROCEDURE — 1159F PR MEDICATION LIST DOCUMENTED IN MEDICAL RECORD: ICD-10-PCS | Mod: CPTII,S$GLB,, | Performed by: OTOLARYNGOLOGY

## 2021-12-23 PROCEDURE — 69210 PR REMOVAL IMPACTED CERUMEN REQUIRING INSTRUMENTATION, UNILATERAL: ICD-10-PCS | Mod: S$GLB,,, | Performed by: OTOLARYNGOLOGY

## 2021-12-23 PROCEDURE — 1101F PT FALLS ASSESS-DOCD LE1/YR: CPT | Mod: CPTII,S$GLB,, | Performed by: OTOLARYNGOLOGY

## 2021-12-23 PROCEDURE — 3288F PR FALLS RISK ASSESSMENT DOCUMENTED: ICD-10-PCS | Mod: CPTII,S$GLB,, | Performed by: OTOLARYNGOLOGY

## 2021-12-23 PROCEDURE — 99212 PR OFFICE/OUTPT VISIT, EST, LEVL II, 10-19 MIN: ICD-10-PCS | Mod: 25,S$GLB,, | Performed by: OTOLARYNGOLOGY

## 2021-12-23 PROCEDURE — 1126F AMNT PAIN NOTED NONE PRSNT: CPT | Mod: CPTII,S$GLB,, | Performed by: OTOLARYNGOLOGY

## 2021-12-23 PROCEDURE — 1126F PR PAIN SEVERITY QUANTIFIED, NO PAIN PRESENT: ICD-10-PCS | Mod: CPTII,S$GLB,, | Performed by: OTOLARYNGOLOGY

## 2021-12-23 PROCEDURE — 1157F PR ADVANCE CARE PLAN OR EQUIV PRESENT IN MEDICAL RECORD: ICD-10-PCS | Mod: CPTII,S$GLB,, | Performed by: OTOLARYNGOLOGY

## 2021-12-28 ENCOUNTER — CLINICAL SUPPORT (OUTPATIENT)
Dept: REHABILITATION | Facility: HOSPITAL | Age: 71
End: 2021-12-28
Attending: ORTHOPAEDIC SURGERY
Payer: COMMERCIAL

## 2021-12-28 DIAGNOSIS — M65.322 TRIGGER INDEX FINGER OF LEFT HAND: Primary | ICD-10-CM

## 2021-12-28 PROCEDURE — 97810 ACUP 1/> WO ESTIM 1ST 15 MIN: CPT | Performed by: ACUPUNCTURIST

## 2021-12-28 PROCEDURE — 97811 ACUP 1/> W/O ESTIM EA ADD 15: CPT | Performed by: ACUPUNCTURIST

## 2021-12-29 ENCOUNTER — CLINICAL SUPPORT (OUTPATIENT)
Dept: REHABILITATION | Facility: HOSPITAL | Age: 71
End: 2021-12-29
Payer: COMMERCIAL

## 2021-12-29 DIAGNOSIS — M21.379 FOOT-DROP, UNSPECIFIED LATERALITY: Primary | ICD-10-CM

## 2021-12-29 DIAGNOSIS — M65.322 TRIGGER INDEX FINGER OF LEFT HAND: ICD-10-CM

## 2021-12-29 PROCEDURE — 97814 ACUP 1/> W/ESTIM EA ADDL 15: CPT | Mod: PN | Performed by: ACUPUNCTURIST

## 2021-12-29 PROCEDURE — 97813 ACUP 1/> W/ESTIM 1ST 15 MIN: CPT | Mod: PN | Performed by: ACUPUNCTURIST

## 2021-12-30 ENCOUNTER — CLINICAL SUPPORT (OUTPATIENT)
Dept: REHABILITATION | Facility: HOSPITAL | Age: 71
End: 2021-12-30
Attending: ORTHOPAEDIC SURGERY
Payer: COMMERCIAL

## 2021-12-30 DIAGNOSIS — M77.8 TENDONITIS OF FINGER: ICD-10-CM

## 2021-12-30 DIAGNOSIS — M19.032 ARTHRITIS OF WRIST, LEFT: ICD-10-CM

## 2021-12-30 DIAGNOSIS — M77.8 LEFT WRIST TENDONITIS: ICD-10-CM

## 2021-12-30 PROCEDURE — 97760 ORTHOTIC MGMT&TRAING 1ST ENC: CPT

## 2021-12-30 PROCEDURE — L3806 WHFO W/JOINT(S) CUSTOM FAB: HCPCS

## 2021-12-30 PROCEDURE — 97110 THERAPEUTIC EXERCISES: CPT

## 2021-12-30 PROCEDURE — 97165 OT EVAL LOW COMPLEX 30 MIN: CPT

## 2021-12-31 ENCOUNTER — PATIENT MESSAGE (OUTPATIENT)
Dept: NEUROLOGY | Facility: CLINIC | Age: 71
End: 2021-12-31
Payer: MEDICARE

## 2021-12-31 ENCOUNTER — PATIENT MESSAGE (OUTPATIENT)
Dept: INTERNAL MEDICINE | Facility: CLINIC | Age: 71
End: 2021-12-31
Payer: MEDICARE

## 2022-01-03 ENCOUNTER — OFFICE VISIT (OUTPATIENT)
Dept: OPTOMETRY | Facility: CLINIC | Age: 72
End: 2022-01-03
Payer: COMMERCIAL

## 2022-01-03 ENCOUNTER — PATIENT MESSAGE (OUTPATIENT)
Dept: REHABILITATION | Facility: HOSPITAL | Age: 72
End: 2022-01-03
Payer: COMMERCIAL

## 2022-01-03 ENCOUNTER — TELEPHONE (OUTPATIENT)
Dept: PHARMACY | Facility: CLINIC | Age: 72
End: 2022-01-03
Payer: COMMERCIAL

## 2022-01-03 DIAGNOSIS — H02.834 DERMATOCHALASIS OF BOTH UPPER EYELIDS: ICD-10-CM

## 2022-01-03 DIAGNOSIS — G50.0 TRIGEMINAL NEURALGIA OF LEFT SIDE OF FACE: ICD-10-CM

## 2022-01-03 DIAGNOSIS — Z04.9 DISEASE RULED OUT AFTER EXAMINATION: ICD-10-CM

## 2022-01-03 DIAGNOSIS — H43.811 PVD (POSTERIOR VITREOUS DETACHMENT), RIGHT EYE: ICD-10-CM

## 2022-01-03 DIAGNOSIS — H04.123 DRY EYE SYNDROME, BILATERAL: Primary | ICD-10-CM

## 2022-01-03 DIAGNOSIS — H02.402 PTOSIS OF LEFT EYELID: ICD-10-CM

## 2022-01-03 DIAGNOSIS — H18.452 SALZMANN'S NODULAR DEGENERATION OF CORNEA OF LEFT EYE: ICD-10-CM

## 2022-01-03 DIAGNOSIS — H02.831 DERMATOCHALASIS OF BOTH UPPER EYELIDS: ICD-10-CM

## 2022-01-03 DIAGNOSIS — H52.4 PRESBYOPIA: ICD-10-CM

## 2022-01-03 DIAGNOSIS — Z96.1 PSEUDOPHAKIA OF BOTH EYES: ICD-10-CM

## 2022-01-03 PROCEDURE — 92015 DETERMINE REFRACTIVE STATE: CPT | Mod: S$GLB,,, | Performed by: OPTOMETRIST

## 2022-01-03 PROCEDURE — 99999 PR PBB SHADOW E&M-EST. PATIENT-LVL III: ICD-10-PCS | Mod: PBBFAC,,, | Performed by: OPTOMETRIST

## 2022-01-03 PROCEDURE — 92014 PR EYE EXAM, EST PATIENT,COMPREHESV: ICD-10-PCS | Mod: S$GLB,,, | Performed by: OPTOMETRIST

## 2022-01-03 PROCEDURE — 92014 COMPRE OPH EXAM EST PT 1/>: CPT | Mod: S$GLB,,, | Performed by: OPTOMETRIST

## 2022-01-03 PROCEDURE — 92015 PR REFRACTION: ICD-10-PCS | Mod: S$GLB,,, | Performed by: OPTOMETRIST

## 2022-01-03 PROCEDURE — 99999 PR PBB SHADOW E&M-EST. PATIENT-LVL III: CPT | Mod: PBBFAC,,, | Performed by: OPTOMETRIST

## 2022-01-03 RX ORDER — PILOCARPINE HYDROCHLORIDE 12.5 MG/ML
1 SOLUTION/ DROPS OPHTHALMIC DAILY
Qty: 5 ML | Refills: 6 | Status: SHIPPED | OUTPATIENT
Start: 2022-01-03 | End: 2022-07-11

## 2022-01-03 RX ORDER — VARENICLINE 0.03 MG/.05ML
1 SPRAY NASAL 2 TIMES DAILY
Qty: 4.2 ML | Refills: 6 | Status: ON HOLD | OUTPATIENT
Start: 2022-01-03 | End: 2022-04-07 | Stop reason: CLARIF

## 2022-01-03 NOTE — PROGRESS NOTES
"HPI     DSL- 6/9/2021     72 y/o male is here for follow-up. H/o of PVD, right eye. Pt reports   floaters of the right eye improved. Pt thinks he has a "stye" RLL X 5 day,   self treating with warm compresses.     Eyemeds  No gtts    Last edited by Allie Avery on 1/3/2022  9:25 AM. (History)            Assessment /Plan     For exam results, see Encounter Report.    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  Dispense: 4.2 mL; Refill: 6   oasys art tears BID    PVD (posterior vitreous detachment), right eye              No holes, tears or RD              Discussed RD precautions              Return 1 month for DFE/Follow up     Dermatochalasis of both upper eyelids  Ptosis of left eyelid              S/p brow lift procedure with Dr. Lainez              Gave samples of Upneeq 6/21, try using in OS only or OU to see if benefits                Consult Dr. Larson for Blepharoplasty eval          Pseudophakia of both eyes   Stable, monitor    Presbyopia       pilocarpine HCl (VUITY) 1.25 % Drop; Apply 1 drop to eye once daily.  Dispense: 5 mL; Refill: 6   Rx specs, Suggested office lens for computer and music    Trigeminal neuralgia of left side of face  Disease ruled out after examination    RTC 1 year, sooner PRN    -                 "

## 2022-01-04 ENCOUNTER — PATIENT MESSAGE (OUTPATIENT)
Dept: NEUROLOGY | Facility: CLINIC | Age: 72
End: 2022-01-04
Payer: COMMERCIAL

## 2022-01-04 ENCOUNTER — OFFICE VISIT (OUTPATIENT)
Dept: PSYCHIATRY | Facility: CLINIC | Age: 72
End: 2022-01-04
Payer: COMMERCIAL

## 2022-01-04 ENCOUNTER — CLINICAL SUPPORT (OUTPATIENT)
Dept: REHABILITATION | Facility: HOSPITAL | Age: 72
End: 2022-01-04
Attending: ORTHOPAEDIC SURGERY
Payer: COMMERCIAL

## 2022-01-04 DIAGNOSIS — F43.21 ADJUSTMENT DISORDER WITH DEPRESSED MOOD: Primary | ICD-10-CM

## 2022-01-04 DIAGNOSIS — M54.50 CHRONIC BILATERAL LOW BACK PAIN, UNSPECIFIED WHETHER SCIATICA PRESENT: ICD-10-CM

## 2022-01-04 DIAGNOSIS — M77.8 TENDONITIS OF FINGER: Primary | ICD-10-CM

## 2022-01-04 DIAGNOSIS — M21.379 FOOT-DROP, UNSPECIFIED LATERALITY: ICD-10-CM

## 2022-01-04 DIAGNOSIS — G89.29 CHRONIC BILATERAL LOW BACK PAIN, UNSPECIFIED WHETHER SCIATICA PRESENT: ICD-10-CM

## 2022-01-04 DIAGNOSIS — G60.0: Primary | ICD-10-CM

## 2022-01-04 PROCEDURE — 90834 PSYTX W PT 45 MINUTES: CPT | Mod: 95,,, | Performed by: PSYCHOLOGIST

## 2022-01-04 PROCEDURE — 90834 PR PSYCHOTHERAPY W/PATIENT, 45 MIN: ICD-10-PCS | Mod: 95,,, | Performed by: PSYCHOLOGIST

## 2022-01-04 PROCEDURE — 97814 ACUP 1/> W/ESTIM EA ADDL 15: CPT | Performed by: ACUPUNCTURIST

## 2022-01-04 PROCEDURE — 97813 ACUP 1/> W/ESTIM 1ST 15 MIN: CPT | Performed by: ACUPUNCTURIST

## 2022-01-04 PROCEDURE — 1157F PR ADVANCE CARE PLAN OR EQUIV PRESENT IN MEDICAL RECORD: ICD-10-PCS | Mod: CPTII,95,, | Performed by: PSYCHOLOGIST

## 2022-01-04 PROCEDURE — 1157F ADVNC CARE PLAN IN RCRD: CPT | Mod: CPTII,95,, | Performed by: PSYCHOLOGIST

## 2022-01-04 NOTE — PROGRESS NOTES
"Psychiatry Initial Visit (PhD/LCSW)  Diagnostic Interview - CPT 57180    Date: 1/4/2022    Site: [facility]    Referral source: Dr. STACEY Puentes     Clinical status of patient: Outpatient    Raffy Rutherford Jr., a 71 y.o. male, for initial evaluation visit.  Met with patient.    Chief complaint/reason for encounter: adjustment     History of present illness: Pt referred by his pcp, Dr. 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 Pt noted a long hx of depression beginning around age 41. He has been tx'd in the past with medication as well as psychotherapy. Dr. Amadou Dennis follows him for medication. His identifies his current problems as associated with the symptoms of Charcot Selena Tooth which are forcing him to modify his lifestyle and give up or modify things that he has found both meaningful and enjoyable: tennis; hiking; and music. 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 He is also a professional counselor and he is considering cutting back on his practice due to the demands of self-care with his illness. He has always considered himself as someone who "pushed forward" with multiple interests and he now has to confront things that he can no longer do. He notes that these limitations are bringing up long-term issues around self-esteem and a sense of failure to achieve that he relates to his father's failure to achieve related to the father's alcoholism and depression. Pt notes that the recent birth of his first grandchild was an occasion of nadya but also sadness realizing that his involvement with this child may be significantly limited by his illness. Pt does not consider himself clinically depressed as he has been in his past. He is seeking psychotherapy for help in managing life and accepting the limitations imposed by his illness and age.     Pain: 5 to " 7    Symptoms:   · Mood: depressed mood  · Anxiety: denied  · Substance abuse: denied  · Cognitive functioning: denied  · Health behaviors: noncontributory    Psychiatric history: has participated in counseling/psychotherapy on an outpatient basis in the past and currently under psychiatric care    Medical history: charcot vinny tooth syndrome; prostate cancer; see chart     Family history of psychiatric illness: father alcoohlic, depression; grandmother alcoholic, depression; sister depression     Social history (marriage, employment, etc.): Pt is a 71 y.o  man with two adult children and one grandchild. He is the oldest of 4 sibs. Both parents are . Pt grew up in a [employer] [place] and moved around a lot growing up. Pt's wife is a APN at [employer]. Pt grew Taoist but is not religiously affiliated at present. Pt describes self as financially stable. Pt notes that his friendship network has been limited bt the pandemic and his illness.     Substance use:   Alcohol: occasional   Drugs: none   Tobacco: none   Caffeine: 2 expresso per day     Current medications and drug reactions (include OTC, herbal): see medication list     Strengths and liabilities: Strength: Patient is expressive/articulate., Strength: Patient is intelligent., Strength: Patient is motivated for change., Strength: Patient has reasonable judgment., Strength: Patient is stable., Liability: Patient has poor health.    Current Evaluation:     Mental Status Exam:  General Appearance:  unremarkable, age appropriate   Speech: normal tone, normal rate, normal pitch, normal volume      Level of Cooperation: cooperative      Thought Processes: normal and logical   Mood: depressed      Thought Content: normal, no suicidality, no homicidality, delusions, or paranoia   Affect: congruent and appropriate   Orientation: Oriented x3   Memory: grossly intact    Attention Span & Concentration: intact   Fund of General Knowledge: intact and appropriate to  age and level of education   Abstract Reasoning: good    Judgment & Insight: good     Language  intact     Diagnostic Impression - Adjustment d/o with Depressed Mood (r/o Depression unspecified)        Plan:individual psychotherapy    Return to Clinic: as scheduled    Length of Service (minutes): 60

## 2022-01-04 NOTE — PROGRESS NOTES
The patient location is: home in [place], [place]   The chief complaint leading to consultation is: depressed mood; self-esteem     Visit type: audiovisual    Face to Face time with patient: 45  60 minutes of total time spent on the encounter, which includes face to face time and non-face to face time preparing to see the patient (eg, review of tests), Obtaining and/or reviewing separately obtained history, Documenting clinical information in the electronic or other health record, Independently interpreting results (not separately reported) and communicating results to the patient/family/caregiver, or Care coordination (not separately reported).         Each patient to whom he or she provides medical services by telemedicine is:  (1) informed of the relationship between the physician and patient and the respective role of any other health care provider with respect to management of the patient; and (2) notified that he or she may decline to receive medical services by telemedicine and may withdraw from such care at any time.    Notes:       Individual Psychotherapy (PhD/LCSW)    1/4/2022    Site:  [facility]         Therapeutic Intervention: Met with patient.  Outpatient - Insight oriented psychotherapy 45 min - CPT code 04176    Chief complaint/reason for encounter: adjustment; depressed mood      Interval history and content of current session: Session focused on the feelings of failure that pt acknowledges when he takes stock of himself despite multiple accomplishments in his life. The sense of failure appears to be mainly in the area of professional accomplishment. 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 He relates his sense of failure to his role in his family as the oldest and the surrogate head of the household due to his father  dysfunctional/alcoholic lifestyle. He recognizes that his mother implicitly idealized people who were rich and famous and transmitted that expectation to him subconsciously. We discussed the way people who are, in fact, rich and famous are not necessarily happy or satisfied with themselves.     Treatment plan:  · Target symptoms: adjustment  · Why chosen therapy is appropriate versus another modality: relevant to diagnosis, patient responds to this modality  · Outcome monitoring methods: self-report, observation  · Therapeutic intervention type: insight oriented psychotherapy    Risk parameters:  Patient reports no suicidal ideation  Patient reports no homicidal ideation  Patient reports no self-injurious behavior  Patient reports no violent behavior    Verbal deficits: None    Patient's response to intervention:  The patient's response to intervention is accepting.    Progress toward goals and other mental status changes:  The patient's progress toward goals is fair .    Diagnosis: adjustment d/o with depressed mood.     Plan:  individual psychotherapy    Return to clinic: as scheduled    Length of Service (minutes): 45         [id number]

## 2022-01-05 ENCOUNTER — PATIENT MESSAGE (OUTPATIENT)
Dept: PSYCHIATRY | Facility: CLINIC | Age: 72
End: 2022-01-05
Payer: COMMERCIAL

## 2022-01-05 NOTE — PROGRESS NOTES
Acupuncture Treatment Note     Name: Raffy Rutherford Jr.  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      Current State: Wants to avoid medication and be able to play cello without worsening his symptoms. Hand specialist diagnosed him with arthritis and tendonitis in left hand.     Response to Previous Treatment:  Some improvement in left index finger joint pain - able to press down on Cello for 20 minutes.  Stiff flexing middle joint but not painful.  Significant improvement in right wrist.  Right leg noticing improvement in neuropathy. Worse with driving.    Quality of Symptoms (Better/Worse):  Better    Activity Level: Plays cello 1 hour per day, walks 8K-13K steps.    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  Head cold feeling with brain fog and fatigue for 9+ days in waves.  Good today.  Negative for Covid 3 home kit tests.  Right sided tinnitus.    Objective      Treatment:  Loosen soft tissue around finger joints, relax muscles of the fingers, increase blood circulation to the hand, reduce inflammation in hand joint, regulate nerve fibers to fire properly down the right leg and foot.     Findings:  Some swelling in left index finger.     Acupuncture Points: Left:  LI4/Tw5, LI10/11 estim, baxie, si5, tw3, lu9, ling gu.  Right:  St36, gb34, sp9, sp6, lv3, gb41, sp4, st41, kd3, temporalis border jacklyn +5.    Taping:  Left brachial plexus origin/insertion and tendons/ligaments of left index finger.     NEEDLES W/STIM  AT: 2:15 AM    NEEDLES W/STIM REMOVED AT: 2:45 AM    Total  Billable Time: 30 minutes    Recommendations:  Continue muscles strengthening exercises for leg atrophy especially glute.  Reduce sugar, dairy, alcohol to 20% diet or 1-2/week.  Include rey/tumeric/vitamin C.    Education:  Remove tape in 3 days or if itchy.  Avoid getting it wet.     #NEEDLES IN: 25    #NEEDLES OUT: 25      Assessment      Analysis of Treatment:  Patient felt lots of sensation post-treatment in arms and legs.      Pt prognosis is Good.     Patient will continue to benefit from acupuncture treatment to address the deficits listed in the problem list box on initial evaluation, provide patient family education and to maximize pt's level of independence in the home and community environment.     Patient's spiritual, cultural and educational needs considered and pt agreeable to plan of care and goals.     Anticipated barriers to treatment: History of injections, scar tissue from hand surgeries, genetic disease.     Plan     Recommend   2/week     treatments for  3 weeks and re-assess.

## 2022-01-06 ENCOUNTER — CLINICAL SUPPORT (OUTPATIENT)
Dept: REHABILITATION | Facility: HOSPITAL | Age: 72
End: 2022-01-06
Payer: COMMERCIAL

## 2022-01-06 DIAGNOSIS — M21.379 FOOT-DROP, UNSPECIFIED LATERALITY: ICD-10-CM

## 2022-01-06 DIAGNOSIS — M77.8 TENDONITIS OF FINGER: Primary | ICD-10-CM

## 2022-01-06 PROCEDURE — 97814 ACUP 1/> W/ESTIM EA ADDL 15: CPT | Performed by: ACUPUNCTURIST

## 2022-01-06 PROCEDURE — 97813 ACUP 1/> W/ESTIM 1ST 15 MIN: CPT | Performed by: ACUPUNCTURIST

## 2022-01-06 NOTE — PROGRESS NOTES
Acupuncture Treatment Note     Name: Raffy Rutherford Jr.  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 provider found    Date of Service: 1/6/2022     Medical Diagnosis: Chronic left hand pain   Evaluation Date: 12/7/2021  Plan of Care Certification Period: 6 sessions and re-assess  Visit #: 7    Precautions: Standard    Subjective     Chief Complaint:  Left finger pain (primarily index DIP/PIP joint then middle finger).      Current State: Wants to avoid medication and be able to play cello without worsening his symptoms. Hand specialist diagnosed him with arthritis and tendonitis in left hand.     Response to Previous Treatment:  Some improvement in left index finger joint pain - able to press down on Cello for 20 minutes.  Stiff flexing middle joint but not painful.  No pain with thin string cello but unable to play cello with thicker strings pain-free.  Significant improvement in right wrist.  Right leg noticing improvement in neuropathy. Worse with driving.    Quality of Symptoms (Better/Worse):  Better    Activity Level: Plays cello 1 hour per day, walks 8K-13K steps.    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  Head cold feeling with brain fog and fatigue for 9+ days in waves.  Good today.  Negative for Covid 3 home kit tests.  Right sided tinnitus.    Objective      Treatment:  Loosen soft tissue around finger joints, relax muscles of the fingers, increase blood circulation to the hand, reduce inflammation in hand joint, regulate nerve fibers to fire properly down the right leg and foot.     Findings:  Some swelling in left index finger.     Acupuncture Points: Left:  LI4/Tw5 estim, LI10/11 estim, baxie, si5, tw3, lu9, ling gu.  Right:  St36, gb34, sp9, sp6, lv3, gb41, sp4, st41, kd3.    Moxa:  Around left index finger joints    Taping: tendons/ligaments of left index finger.      NEEDLES W/STIM  AT: 11:15 AM    NEEDLES W/STIM REMOVED AT: 11:45 AM    Total Billable Time: 30 minutes    Recommendations:  Continue muscles strengthening exercises for leg atrophy especially glute.  Reduce sugar, dairy, alcohol to 20% diet or 1-2/week.  Include rey/tumeric/vitamin C.    Education:  Remove tape in 3 days or if itchy.  Avoid getting it wet.     #NEEDLES IN: 20    #NEEDLES OUT: 20      Assessment      Analysis of Treatment:  Patient felt lots of sensation post-treatment in arms and legs.      Pt prognosis is Good.     Patient will continue to benefit from acupuncture treatment to address the deficits listed in the problem list box on initial evaluation, provide patient family education and to maximize pt's level of independence in the home and community environment.     Patient's spiritual, cultural and educational needs considered and pt agreeable to plan of care and goals.     Anticipated barriers to treatment: History of injections, scar tissue from hand surgeries, genetic disease.     Plan     Recommend   2/week     treatments for  3 weeks and re-assess.

## 2022-01-11 ENCOUNTER — OFFICE VISIT (OUTPATIENT)
Dept: PSYCHIATRY | Facility: CLINIC | Age: 72
End: 2022-01-11
Payer: COMMERCIAL

## 2022-01-11 DIAGNOSIS — F43.21 ADJUSTMENT DISORDER WITH DEPRESSED MOOD: Primary | ICD-10-CM

## 2022-01-11 PROCEDURE — 1157F ADVNC CARE PLAN IN RCRD: CPT | Mod: CPTII,95,, | Performed by: PSYCHOLOGIST

## 2022-01-11 PROCEDURE — 90834 PSYTX W PT 45 MINUTES: CPT | Mod: 95,,, | Performed by: PSYCHOLOGIST

## 2022-01-11 PROCEDURE — 90834 PR PSYCHOTHERAPY W/PATIENT, 45 MIN: ICD-10-PCS | Mod: 95,,, | Performed by: PSYCHOLOGIST

## 2022-01-11 PROCEDURE — 1157F PR ADVANCE CARE PLAN OR EQUIV PRESENT IN MEDICAL RECORD: ICD-10-PCS | Mod: CPTII,95,, | Performed by: PSYCHOLOGIST

## 2022-01-11 NOTE — PROGRESS NOTES
The patient location is: home in [place], [place]   The chief complaint leading to consultation is: depressed mood; self-esteem     Visit type: audiovisual    Face to Face time with patient: 45  60 minutes of total time spent on the encounter, which includes face to face time and non-face to face time preparing to see the patient (eg, review of tests), Obtaining and/or reviewing separately obtained history, Documenting clinical information in the electronic or other health record, Independently interpreting results (not separately reported) and communicating results to the patient/family/caregiver, or Care coordination (not separately reported).         Each patient to whom he or she provides medical services by telemedicine is:  (1) informed of the relationship between the physician and patient and the respective role of any other health care provider with respect to management of the patient; and (2) notified that he or she may decline to receive medical services by telemedicine and may withdraw from such care at any time.    Notes:       Individual Psychotherapy (PhD/LCSW)    1/11/2022    Site:  [facility]         Therapeutic Intervention: Met with patient.  Outpatient - Insight oriented psychotherapy 45 min - CPT code 64852    Chief complaint/reason for encounter: adjustment; depressed mood      Interval history and content of current session: Session focused on the way his medical issues have affected his ability to perform musically and the fear it will make it impossible to continue playing. Although he is accomplished in a number of creative activities (performing, composing, writing) music is his first love. 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 He acknowledged a desire to remain creative regardless of the medium and he recognized that he can go further in the area of composing by taking  "more time to get into music theory. 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 We discussed his conflict in accepting and adapting to his medical circumstances as part of the grief process. Also addressed his tendency to downplay his accomplishments in the context of the "negativity bias of the brain" and offered reading suggestions to address this tendency. .     Treatment plan:  · Target symptoms: adjustment  · Why chosen therapy is appropriate versus another modality: relevant to diagnosis, patient responds to this modality  · Outcome monitoring methods: self-report, observation  · Therapeutic intervention type: insight oriented psychotherapy    Risk parameters:  Patient reports no suicidal ideation  Patient reports no homicidal ideation  Patient reports no self-injurious behavior  Patient reports no violent behavior    Verbal deficits: None    Patient's response to intervention:  The patient's response to intervention is accepting.    Progress toward goals and other mental status changes:  The patient's progress toward goals is fair .    Diagnosis: adjustment d/o with depressed mood.     Plan:  individual psychotherapy    Return to clinic: as scheduled    Length of Service (minutes): 45      "

## 2022-01-18 ENCOUNTER — DOCUMENTATION ONLY (OUTPATIENT)
Dept: HEMATOLOGY/ONCOLOGY | Facility: CLINIC | Age: 72
End: 2022-01-18
Payer: COMMERCIAL

## 2022-01-18 ENCOUNTER — PATIENT MESSAGE (OUTPATIENT)
Dept: NEUROLOGY | Facility: CLINIC | Age: 72
End: 2022-01-18
Payer: COMMERCIAL

## 2022-01-18 NOTE — Clinical Note
Rhea,  Please phone patient with the following: --Inform patient of genetic testing results as detailed in attached note; and --Inform patient that a results letter with recommendations has been sent to patient via [employer] ([facility]).  2.   Please document your call and CC referring provider and me.  3.   Lastly, please ensure that the results report is scanned into chart in Epic.  Thank you, Aris

## 2022-01-18 NOTE — LETTER
January 18, 2022    Raffy Rutherford Jr.  [address]  [place] [address]             [facility]  Hematology and Oncology  [address]  [facility] [address]  Phone: [phone]  Fax: [phone] CC:  Adolph Adame Jr., MD      Dear Raffy,    You recently underwent germline cancer genetic testing after meeting with me, Aris Rain NP, with the [facility]'s Hereditary/High Risk Clinic.  Below is important information pertaining to your genetic testing results as well as other aspects of your health care.  Please read the letter at your earliest convenience and reach out to me with any questions or concerns.      If you would like to have an in-person or a virtual appointment for post-test genetic counseling, please message me through your [facility] patient portal or call me at [phone] to schedule the appointment.    Cancer Genetic Testing Results    The specific test that you underwent was the [facility] Multi-Cancer + RNA Panel through [facility].  In the 84 genes that were tested, no clinically significant mutations or variants of unknown significance were identified; in other words, your test came back negative.    Adolph Adame Jr., MD, will be notified of your genetic testing results.  A copy of your genetic testing results will be in your [facility] medical record.  Your genetic testing results report has been released to you and is accessible by you through your [facility] patient portal; if you have any difficulty accessing this, please let our office know so we can mail you a hard copy.    What Do These Results Mean?    Having negative genetic testing reduces but does not completely eliminate the possibility of a hereditary cancer, in part because genetic testing technology and knowledge of genes' association with cancer are evolving, so in the future updated genetic testing may be recommended for you.    Only a small  percentage of cancers are hereditary, meaning due to an inherited gene mutation.  Environmental and lifestyle factors play a significant role in the development of many cancers.  As such, your genetic testing results also do not necessarily indicate that you are not at increased risk for certain cancers, as you may independently have risk factors for certain cancers and/or you may share risk factors with your family members affected by cancer.  Because of this, it is strongly recommended that you ensure that your healthcare providers are aware of and up-to-date on your personal and family histories so that your medical management including cancer screenings can be based in part off of this information.      Health Maintenance / Cancer Risks and Risk Management    It is recommended that you be established with a primary care provider (PCP), whom you should see at least annually for routine health maintenance.  If you are not established with a PCP, please contact me so I can refer you.    Given your personal history of gastrointestinal (GI) polyps, it is very important that you remain up-to-date on screenings including colonoscopies for colorectal cancer screening, and I recommend that you continue to be followed by a GI specialist.  I believe you're established with Dr. Amadou Hardin as your GI specialist, but if at any point you should need a referral, just let me know.  Furthermore, given that your mother had colorectal cancer, it is recommended that screening colonoscopies be performed for you in intervals no longer than every 5 years; these intervals may be further modified based on personal and family history.  Please discuss this with Dr. Amadou Hardin.    Some information regarding general cancer risk reduction:  It is recommended to avoid tobacco use; be physically active; maintain a healthy weight; eat a diet rich in fruits, vegetables, and whole grains and low in saturated/trans fat, red meat, and processed  meat; limit alcohol consumption (zero is best); protect against sexually transmitted infections; protect against the sun, and avoid tanning beds; and get regular screenings for cancers as recommended by your healthcare team.    Regarding Your Relatives    Your relatives also may be at increased risk for certain cancers, depending upon their own personal and family history.    Additionally, it is possible for them to have cancer-related genetic mutations even if/when you have negative genetic testing.      Your relatives should speak with a healthcare provider about their cancer risks and whether genetic counseling/testing may be indicated for them.  Anyone interested in pursuing cancer-genetic counseling/testing may contact the [facility] at [phone] to schedule an appointment or visit [email or web address] to locate a local genetic specialist.    Follow-up    Please continue to follow up with all healthcare providers as directed.    Moving forward, please update me with any changes to your personal or family history and with any relative's genetic testing results.  Barring any such changes, please follow up with me in 3 years for determination as to whether updated genetic testing is indicated for you at that time.    In the meantime, please don't hesitate to reach out with any questions or concerns.    Sincerely,        Aris Rain, ETHEL, APRN, FNP-BC, AOCNP  Nurse Practitioner, Hereditary/High Risk Clinic  [facility]    Phone:  [phone]

## 2022-01-18 NOTE — PROGRESS NOTES
"Hereditary and High Risk Clinic  Department of Hematology and Oncology  [facility]    Cancer Genetics  Results Note    Patient Identification  Name: Raffy Rutherford Jr. ("Raffy")  : 1950  MRN: [id number]        Notified Rafyf of his germline cancer-genetic testing results and recommendations via a letter sent through [employer] ([facility]).  Genetics Navigator is to phone Raffy with his results and with notification that the above letter has been sent and is to also ensure that the results report is in Raffy's chart in Epic.    REFERENCES     1. MEL Weaver. (202). Overview of cancer prevention. In LUIS ARMANDO. Mika, ALBERTO Bond, & ELVER Bartholomew (Eds.), UpToDate.  2. National Comprehensive Cancer Network (NCCN). (). Colorectal cancer screening. NCCN Clinical Practice Guidelines in Oncology (NCCN Guidelines), Version 2..    Signed,    Aris Rain, DNP, APRN, FNP-BC, AOCNP  Hereditary/High Risk Clinic  Department of Hematology/Oncology  [facility]          CC:  Adolph Adame Jr., MD      "

## 2022-01-19 DIAGNOSIS — G60.0: Primary | ICD-10-CM

## 2022-01-20 ENCOUNTER — LAB VISIT (OUTPATIENT)
Dept: LAB | Facility: HOSPITAL | Age: 72
End: 2022-01-20
Attending: RADIOLOGY
Payer: COMMERCIAL

## 2022-01-20 ENCOUNTER — PATIENT MESSAGE (OUTPATIENT)
Dept: RADIATION ONCOLOGY | Facility: CLINIC | Age: 72
End: 2022-01-20
Payer: COMMERCIAL

## 2022-01-20 ENCOUNTER — PATIENT MESSAGE (OUTPATIENT)
Dept: NEUROLOGY | Facility: CLINIC | Age: 72
End: 2022-01-20
Payer: COMMERCIAL

## 2022-01-20 DIAGNOSIS — C61 PROSTATE CANCER: ICD-10-CM

## 2022-01-20 LAB — COMPLEXED PSA SERPL-MCNC: 8.6 NG/ML (ref 0–4)

## 2022-01-20 PROCEDURE — 36415 COLL VENOUS BLD VENIPUNCTURE: CPT | Performed by: RADIOLOGY

## 2022-01-20 PROCEDURE — 84153 ASSAY OF PSA TOTAL: CPT | Performed by: RADIOLOGY

## 2022-01-24 LAB
GENETIC COUNSELING?: YES
GENSO SPECIMEN TYPE: NORMAL
MISCELLANEOUS GENETIC TEST NAME: NORMAL
PARTENTAL OR SIBLING TESTING?: NO
REFERENCE LAB: NORMAL
TEST RESULT: NORMAL

## 2022-01-25 ENCOUNTER — PATIENT MESSAGE (OUTPATIENT)
Dept: NEUROLOGY | Facility: CLINIC | Age: 72
End: 2022-01-25
Payer: COMMERCIAL

## 2022-01-25 ENCOUNTER — PATIENT MESSAGE (OUTPATIENT)
Dept: REHABILITATION | Facility: HOSPITAL | Age: 72
End: 2022-01-25
Payer: COMMERCIAL

## 2022-01-27 ENCOUNTER — PROCEDURE VISIT (OUTPATIENT)
Dept: NEUROLOGY | Facility: CLINIC | Age: 72
End: 2022-01-27
Payer: COMMERCIAL

## 2022-01-27 ENCOUNTER — OFFICE VISIT (OUTPATIENT)
Dept: PSYCHIATRY | Facility: CLINIC | Age: 72
End: 2022-01-27
Payer: COMMERCIAL

## 2022-01-27 DIAGNOSIS — G60.0 CHARCOT-MARIE-TOOTH DISEASE: ICD-10-CM

## 2022-01-27 DIAGNOSIS — F41.9 ANXIETY: ICD-10-CM

## 2022-01-27 DIAGNOSIS — M21.371 FOOT DROP, BILATERAL: Primary | ICD-10-CM

## 2022-01-27 DIAGNOSIS — M21.372 FOOT DROP, BILATERAL: Primary | ICD-10-CM

## 2022-01-27 PROCEDURE — 95886 MUSC TEST DONE W/N TEST COMP: CPT | Mod: S$GLB,,, | Performed by: PSYCHIATRY & NEUROLOGY

## 2022-01-27 PROCEDURE — 95913 PR NERVE CONDUCTION STUDY; 13 OR MORE STUDIES: ICD-10-PCS | Mod: S$GLB,,, | Performed by: PSYCHIATRY & NEUROLOGY

## 2022-01-27 PROCEDURE — 90834 PSYTX W PT 45 MINUTES: CPT | Mod: 95,,, | Performed by: PSYCHOLOGIST

## 2022-01-27 PROCEDURE — 95886 PR EMG COMPLETE, W/ NERVE CONDUCTION STUDIES, 5+ MUSCLES: ICD-10-PCS | Mod: S$GLB,,, | Performed by: PSYCHIATRY & NEUROLOGY

## 2022-01-27 PROCEDURE — 95913 NRV CNDJ TEST 13/> STUDIES: CPT | Mod: S$GLB,,, | Performed by: PSYCHIATRY & NEUROLOGY

## 2022-01-27 PROCEDURE — 90834 PR PSYCHOTHERAPY W/PATIENT, 45 MIN: ICD-10-PCS | Mod: 95,,, | Performed by: PSYCHOLOGIST

## 2022-01-27 PROCEDURE — 1157F ADVNC CARE PLAN IN RCRD: CPT | Mod: CPTII,95,, | Performed by: PSYCHOLOGIST

## 2022-01-27 PROCEDURE — 1157F PR ADVANCE CARE PLAN OR EQUIV PRESENT IN MEDICAL RECORD: ICD-10-PCS | Mod: CPTII,95,, | Performed by: PSYCHOLOGIST

## 2022-01-27 NOTE — PROGRESS NOTES
"  The patient location is: home in [place], [place]   The chief complaint leading to consultation is: depressed mood; self-esteem     Visit type: audiovisual    Face to Face time with patient: 45  60 minutes of total time spent on the encounter, which includes face to face time and non-face to face time preparing to see the patient (eg, review of tests), Obtaining and/or reviewing separately obtained history, Documenting clinical information in the electronic or other health record, Independently interpreting results (not separately reported) and communicating results to the patient/family/caregiver, or Care coordination (not separately reported).         Each patient to whom he or she provides medical services by telemedicine is:  (1) informed of the relationship between the physician and patient and the respective role of any other health care provider with respect to management of the patient; and (2) notified that he or she may decline to receive medical services by telemedicine and may withdraw from such care at any time.    Notes:       Individual Psychotherapy (PhD/LCSW)    1/27/2022    Site:  [facility]         Therapeutic Intervention: Met with patient.  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 Pt reviewed various areas of accomplishment in his life and noted that in each instance he "hit a wall." that was a barrier for him reaching the level to which he aspired. He indicated that this lack of success has left him feeling "unsafe". We discussed the way this paradigm tends to devalue the pleasures and joys of the moment and makes his " "self-assessment too focused on "outcome" rather than "process."       Treatment plan:  · Target symptoms: adjustment  · Why chosen therapy is appropriate versus another modality: relevant to diagnosis, patient responds to this modality  · Outcome monitoring methods: self-report, observation  · Therapeutic intervention type: insight oriented psychotherapy    Risk parameters:  Patient reports no suicidal ideation  Patient reports no homicidal ideation  Patient reports no self-injurious behavior  Patient reports no violent behavior    Verbal deficits: None    Patient's response to intervention:  The patient's response to intervention is accepting.    Progress toward goals and other mental status changes:  The patient's progress toward goals is fair .    Diagnosis: adjustment d/o with depressed mood.     Plan:  individual psychotherapy    Return to clinic: as scheduled    Length of Service (minutes): 45      "

## 2022-01-28 ENCOUNTER — TELEPHONE (OUTPATIENT)
Dept: HEMATOLOGY/ONCOLOGY | Facility: CLINIC | Age: 72
End: 2022-01-28
Payer: COMMERCIAL

## 2022-01-28 ENCOUNTER — PATIENT MESSAGE (OUTPATIENT)
Dept: NEUROLOGY | Facility: CLINIC | Age: 72
End: 2022-01-28
Payer: COMMERCIAL

## 2022-01-28 NOTE — TELEPHONE ENCOUNTER
Attempted to call and review the results. He did not answer, left a brief message with my direct call back number.    ----- Message from Aris Rain DNP sent at 1/18/2022 11:48 AM CST -----  Rhea,    Please phone patient with the following:  --Inform patient of genetic testing results as detailed in attached note; and  --Inform patient that a results letter with recommendations has been sent to patient via [employer] ([facility]).    2.   Please document your call and CC referring provider and me.    3.   Lastly, please ensure that the results report is scanned into chart in Epic.    Thank you,  Aris

## 2022-01-31 ENCOUNTER — OFFICE VISIT (OUTPATIENT)
Dept: RADIATION ONCOLOGY | Facility: CLINIC | Age: 72
End: 2022-01-31
Attending: RADIOLOGY
Payer: COMMERCIAL

## 2022-01-31 DIAGNOSIS — C61 PROSTATE CANCER: Primary | ICD-10-CM

## 2022-01-31 PROCEDURE — 1159F MED LIST DOCD IN RCRD: CPT | Mod: CPTII,95,, | Performed by: RADIOLOGY

## 2022-01-31 PROCEDURE — 99212 PR OFFICE/OUTPT VISIT, EST, LEVL II, 10-19 MIN: ICD-10-PCS | Mod: 95,,, | Performed by: RADIOLOGY

## 2022-01-31 PROCEDURE — 1159F PR MEDICATION LIST DOCUMENTED IN MEDICAL RECORD: ICD-10-PCS | Mod: CPTII,95,, | Performed by: RADIOLOGY

## 2022-01-31 PROCEDURE — 1160F RVW MEDS BY RX/DR IN RCRD: CPT | Mod: CPTII,95,, | Performed by: RADIOLOGY

## 2022-01-31 PROCEDURE — 1157F ADVNC CARE PLAN IN RCRD: CPT | Mod: CPTII,95,, | Performed by: RADIOLOGY

## 2022-01-31 PROCEDURE — 1160F PR REVIEW ALL MEDS BY PRESCRIBER/CLIN PHARMACIST DOCUMENTED: ICD-10-PCS | Mod: CPTII,95,, | Performed by: RADIOLOGY

## 2022-01-31 PROCEDURE — 1157F PR ADVANCE CARE PLAN OR EQUIV PRESENT IN MEDICAL RECORD: ICD-10-PCS | Mod: CPTII,95,, | Performed by: RADIOLOGY

## 2022-01-31 PROCEDURE — 99212 OFFICE O/P EST SF 10 MIN: CPT | Mod: 95,,, | Performed by: RADIOLOGY

## 2022-01-31 NOTE — PROGRESS NOTES
Subjective:       Patient ID: Raffy Rutherford Jr. is a 71 y.o. male.    Chief Complaint: No chief complaint on file.    The patient location is: home  The chief complaint leading to consultation is: prostate cancer     Visit type: audiovisual    Face to Face time with patient: 15 minutes   25 minutes of total time spent on the encounter, which includes face to face time and non-face to face time preparing to see the patient (eg, review of tests), Obtaining and/or reviewing separately obtained history, Documenting clinical information in the electronic or other health record, Independently interpreting results (not separately reported) and communicating results to the patient/family/caregiver, or Care coordination (not separately reported).     Each patient to whom he or she provides medical services by telemedicine is:  (1) informed of the relationship between the physician and patient and the respective role of any other health care provider with respect to management of the patient; and (2) notified that he or she may decline to receive medical services by telemedicine and may withdraw from such care at any time.    This patient presents for follow up visit.     Mr. Rutherford has a history clinical stage IIB (T1c, N0, M0, PSA < 10, GG2) prostate cancer.  He has a history of an elevated PSA and was referred for further evaluation after repeat PSA on 5/25/21 returned at 8.2 ng/ml.  MRI on 5/25/21 revealed a 46.7 cc prostate with a 1.3 cm T2 hypointense lesion in the Rt. mid gland, PI-RADS 4.  There was no extraprostatic extension.  The seminal vesicles and neurovascular bundles were unremarkable.  Biopsies on 6/15/21 revealed Daniel 7 (3+4) adenocarcinoma involving 29% of 4 of 6 cores from the targeted lesion in the Rt. apex.  The [place] pattern 4 accounted for 10 - 20% of the tumor.  There was a small focus of atypical glands at the Lt. apex but the remaining biopsies revealed benign prostatic tissue.  Polaris  recurrence score returned at 3.2 which is at the borderline of active surveillance and single modality treatment.   His 10 year disease specific mortality was 3 % and risk of metastatic disease of 1.2% at 10 years with treatment.  Germline testing was negative.  The patient has elected for active surveillance.  Today the patient states he feels well.  No complaints.      Review of Systems   Constitutional: Negative for activity change, appetite change, chills and fatigue.   Respiratory: Negative for cough and shortness of breath.    Gastrointestinal: Negative for abdominal pain, constipation, diarrhea and fecal incontinence.   Genitourinary: Negative for bladder incontinence, difficulty urinating, dysuria, frequency and hematuria.         Objective:      Physical Exam  Constitutional:       General: He is not in acute distress.     Appearance: Normal appearance.   Neurological:      Mental Status: He is alert and oriented to person, place, and time.   Psychiatric:         Mood and Affect: Mood normal.         Judgment: Judgment normal.       Results for JAQUI RICHARDSON ANGEL HUFF (MRN [id number]) as of 1/31/2022 12:57   Ref. Range 5/3/2021 15:15 5/25/2021 15:52 9/22/2021 11:18 1/20/2022 11:33   PSA Diagnostic Latest Ref Range: 0.00 - 4.00 ng/mL 9.9 (H)  10.2 (H) 8.6 (H)   Prostate Specific Antigen (phi) Latest Ref Range: <=4.5 ng/mL  8.2 (H)       Assessment:       Problem List Items Addressed This Visit     Prostate cancer - Primary          Plan:       Discussed PSA results.  Will continue with active surveillance.  Plan follow up with repeat PSA the end of May.

## 2022-02-07 ENCOUNTER — TELEPHONE (OUTPATIENT)
Dept: ORTHOPEDICS | Facility: CLINIC | Age: 72
End: 2022-02-07
Payer: COMMERCIAL

## 2022-02-07 DIAGNOSIS — M79.642 CHRONIC PAIN OF LEFT HAND: Primary | ICD-10-CM

## 2022-02-07 DIAGNOSIS — G89.29 CHRONIC PAIN OF LEFT HAND: Primary | ICD-10-CM

## 2022-02-07 NOTE — TELEPHONE ENCOUNTER
Left a voice mail for pt to return my call regarding x-rays that need to be done prior to his appointment tomorrow at [facility]

## 2022-02-08 ENCOUNTER — OFFICE VISIT (OUTPATIENT)
Dept: ORTHOPEDICS | Facility: CLINIC | Age: 72
End: 2022-02-08
Payer: COMMERCIAL

## 2022-02-08 ENCOUNTER — HOSPITAL ENCOUNTER (OUTPATIENT)
Dept: RADIOLOGY | Facility: HOSPITAL | Age: 72
Discharge: HOME | End: 2022-02-08
Attending: ORTHOPAEDIC SURGERY
Payer: COMMERCIAL

## 2022-02-08 ENCOUNTER — OFFICE VISIT (OUTPATIENT)
Dept: PSYCHIATRY | Facility: CLINIC | Age: 72
End: 2022-02-08
Payer: COMMERCIAL

## 2022-02-08 DIAGNOSIS — R20.0 BILATERAL HAND NUMBNESS: ICD-10-CM

## 2022-02-08 DIAGNOSIS — M19.042 PRIMARY OSTEOARTHRITIS OF BOTH HANDS: Primary | ICD-10-CM

## 2022-02-08 DIAGNOSIS — F43.21 ADJUSTMENT DISORDER WITH DEPRESSED MOOD: Primary | ICD-10-CM

## 2022-02-08 DIAGNOSIS — M19.041 PRIMARY OSTEOARTHRITIS OF BOTH HANDS: Primary | ICD-10-CM

## 2022-02-08 DIAGNOSIS — M79.642 CHRONIC PAIN OF LEFT HAND: ICD-10-CM

## 2022-02-08 DIAGNOSIS — G89.29 CHRONIC PAIN OF LEFT HAND: ICD-10-CM

## 2022-02-08 PROCEDURE — 1160F RVW MEDS BY RX/DR IN RCRD: CPT | Mod: CPTII,S$GLB,, | Performed by: ORTHOPAEDIC SURGERY

## 2022-02-08 PROCEDURE — 99214 PR OFFICE/OUTPT VISIT, EST, LEVL IV, 30-39 MIN: ICD-10-PCS | Mod: 25,S$GLB,, | Performed by: ORTHOPAEDIC SURGERY

## 2022-02-08 PROCEDURE — 99214 OFFICE O/P EST MOD 30 MIN: CPT | Mod: 25,S$GLB,, | Performed by: ORTHOPAEDIC SURGERY

## 2022-02-08 PROCEDURE — 90834 PR PSYCHOTHERAPY W/PATIENT, 45 MIN: ICD-10-PCS | Mod: 95,,, | Performed by: PSYCHOLOGIST

## 2022-02-08 PROCEDURE — 73130 X-RAY EXAM OF HAND: CPT | Mod: 26,LT,, | Performed by: RADIOLOGY

## 2022-02-08 PROCEDURE — 1157F PR ADVANCE CARE PLAN OR EQUIV PRESENT IN MEDICAL RECORD: ICD-10-PCS | Mod: CPTII,S$GLB,, | Performed by: ORTHOPAEDIC SURGERY

## 2022-02-08 PROCEDURE — 90834 PSYTX W PT 45 MINUTES: CPT | Mod: 95,,, | Performed by: PSYCHOLOGIST

## 2022-02-08 PROCEDURE — 1159F PR MEDICATION LIST DOCUMENTED IN MEDICAL RECORD: ICD-10-PCS | Mod: CPTII,S$GLB,, | Performed by: ORTHOPAEDIC SURGERY

## 2022-02-08 PROCEDURE — 99999 PR PBB SHADOW E&M-EST. PATIENT-LVL II: ICD-10-PCS | Mod: PBBFAC,,, | Performed by: ORTHOPAEDIC SURGERY

## 2022-02-08 PROCEDURE — 1157F ADVNC CARE PLAN IN RCRD: CPT | Mod: CPTII,95,, | Performed by: PSYCHOLOGIST

## 2022-02-08 PROCEDURE — 20600 DRAIN/INJ JOINT/BURSA W/O US: CPT | Mod: LT,S$GLB,, | Performed by: ORTHOPAEDIC SURGERY

## 2022-02-08 PROCEDURE — 1157F ADVNC CARE PLAN IN RCRD: CPT | Mod: CPTII,S$GLB,, | Performed by: ORTHOPAEDIC SURGERY

## 2022-02-08 PROCEDURE — 20600 SMALL JOINT ASPIRATION/INJECTION: L INDEX MCP: ICD-10-PCS | Mod: LT,S$GLB,, | Performed by: ORTHOPAEDIC SURGERY

## 2022-02-08 PROCEDURE — 1159F MED LIST DOCD IN RCRD: CPT | Mod: CPTII,S$GLB,, | Performed by: ORTHOPAEDIC SURGERY

## 2022-02-08 PROCEDURE — 1160F PR REVIEW ALL MEDS BY PRESCRIBER/CLIN PHARMACIST DOCUMENTED: ICD-10-PCS | Mod: CPTII,S$GLB,, | Performed by: ORTHOPAEDIC SURGERY

## 2022-02-08 PROCEDURE — 73130 XR HAND COMPLETE 3 VIEW LEFT: ICD-10-PCS | Mod: 26,LT,, | Performed by: RADIOLOGY

## 2022-02-08 PROCEDURE — 73130 X-RAY EXAM OF HAND: CPT | Mod: TC,LT

## 2022-02-08 PROCEDURE — 1157F PR ADVANCE CARE PLAN OR EQUIV PRESENT IN MEDICAL RECORD: ICD-10-PCS | Mod: CPTII,95,, | Performed by: PSYCHOLOGIST

## 2022-02-08 PROCEDURE — 99999 PR PBB SHADOW E&M-EST. PATIENT-LVL II: CPT | Mod: PBBFAC,,, | Performed by: ORTHOPAEDIC SURGERY

## 2022-02-08 RX ADMIN — METHYLPREDNISOLONE ACETATE 40 MG: 40 INJECTION, SUSPENSION INTRA-ARTICULAR; INTRALESIONAL; INTRAMUSCULAR; SOFT TISSUE at 01:02

## 2022-02-08 NOTE — PROGRESS NOTES
Raffy Rutherford  presents for follow up evaluation of     Encounter Diagnoses   Name Primary?    Primary osteoarthritis of both hands Yes    Bilateral hand numbness     Chronic pain of left hand      He returns to clinic today for for pain in left index finger. Pain mostly in the MPJ & PIPJ. No catching or locking. He did go to OT and had a radial gutter resting splint for left wrist and index fingex made, he feels that it is painful to wear at night. 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  He also tried acupuncture but denies feel that this gave him significant hand pain relief.       PE:    AA&O x 4.  NAD  HEENT:  NCAT, sclera nonicteric  Lungs:  Respirations are equal and unlabored.  CV:  2+ bilateral upper and lower extremity pulses.  MSK:  Tender to palpation left index MP joint, no instability to varus or valgus stress. Not as tender in PIPJ or DIPJ. No TTP at A1 pulley. No catching or locking. BL Dupuytren's. Neurovascularly intact bilaterally.  5/5 thenar and intrinsic musculature strength.  Full range of motion hands, wrists and elbows.    X-rays AP, lateral and oblique left hand taken today are independently reviewed by me and show mild MP and IP joint narrowing with no significant change from previous exam.     EMG: EMG from 1/2021 reviewed. It is negative.     A/P: left index MPJ arthritis, Charcot-Selena-Tooth disease  1) -I have offered him a selective injection. I have explained the risks, benefits, and alternatives of the procedure in detail.  The patient voices understanding and all questions have been answered. The patient agrees to proceed as planned. So after a sterile prep of the skin in the normal fashion the left index MCP joint was injected using a 25 gauge needle with a combination of 1cc 1% plain lidocaine and  40 mg of methylprednisolone.  The patient is cautioned and immediate relief of pain is secondary to the local anesthetic and will be temporary.  After the anesthetic wears off there may be a increase in pain that may last for a few hours or a few days and they should use ice to help alleviate this flair up of pain. Patient tolerated the procedure well.     2) I have recommended a splint adjustment with therapy and evening wear of the splint, follow-up as needed for worsening of symptoms  3) Call with any questions/concerns in the interim        Kaye Solis MD     Please be aware that this note has been generated with the assistance of [facility] voice-to-text.  Please excuse any spelling or grammatical errors.

## 2022-02-08 NOTE — PROGRESS NOTES
The patient location is: home in [place], [place]   The chief complaint leading to consultation is: depressed mood; self-esteem     Visit type: audiovisual    Face to Face time with patient: 45  60 minutes of total time spent on the encounter, which includes face to face time and non-face to face time preparing to see the patient (eg, review of tests), Obtaining and/or reviewing separately obtained history, Documenting clinical information in the electronic or other health record, Independently interpreting results (not separately reported) and communicating results to the patient/family/caregiver, or Care coordination (not separately reported).         Each patient to whom he or she provides medical services by telemedicine is:  (1) informed of the relationship between the physician and patient and the respective role of any other health care provider with respect to management of the patient; and (2) notified that he or she may decline to receive medical services by telemedicine and may withdraw from such care at any time.    Notes:       Individual Psychotherapy (PhD/LCSW)    2/8/2022    Site:  [facility]         Therapeutic Intervention: Met with patient.  Outpatient - Insight oriented psychotherapy 45 min - CPT code 85329    Chief complaint/reason for encounter: adjustment; depressed mood      Interval history and content of current session: Session discussed the way a lack of diagnosis and clear tx plan for his chronic pain problems is making it hard to adapt. He noted conflicting medical opinions that are making him discouraged and demoralized. We discussed strategies for coping with the problems associated with uncertainty. 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 He agrees that his writing and composing still offer him outlets for creative expression. But he doesn't ever experience the kind of response and  "influence he once enjoyed. On the other hand he is looking forward to beginning this week providing hands on care for his 3 month old grandchild while his daughter does her work remotely in their household. "       Treatment plan:  · Target symptoms: adjustment  · Why chosen therapy is appropriate versus another modality: relevant to diagnosis, patient responds to this modality  · Outcome monitoring methods: self-report, observation  · Therapeutic intervention type: insight oriented psychotherapy    Risk parameters:  Patient reports no suicidal ideation  Patient reports no homicidal ideation  Patient reports no self-injurious behavior  Patient reports no violent behavior    Verbal deficits: None    Patient's response to intervention:  The patient's response to intervention is accepting.    Progress toward goals and other mental status changes:  The patient's progress toward goals is fair .    Diagnosis: adjustment d/o with depressed mood.     Plan:  individual psychotherapy    Return to clinic: as scheduled    Length of Service (minutes): 45      "

## 2022-02-09 ENCOUNTER — PATIENT MESSAGE (OUTPATIENT)
Dept: ORTHOPEDICS | Facility: CLINIC | Age: 72
End: 2022-02-09
Payer: COMMERCIAL

## 2022-02-09 RX ORDER — METHYLPREDNISOLONE ACETATE 40 MG/ML
40 INJECTION, SUSPENSION INTRA-ARTICULAR; INTRALESIONAL; INTRAMUSCULAR; SOFT TISSUE
Status: DISCONTINUED | OUTPATIENT
Start: 2022-02-08 | End: 2022-02-09 | Stop reason: HOSPADM

## 2022-02-10 ENCOUNTER — TELEPHONE (OUTPATIENT)
Dept: NEUROLOGY | Facility: CLINIC | Age: 72
End: 2022-02-10
Payer: COMMERCIAL

## 2022-02-10 ENCOUNTER — PATIENT MESSAGE (OUTPATIENT)
Dept: NEUROLOGY | Facility: CLINIC | Age: 72
End: 2022-02-10
Payer: COMMERCIAL

## 2022-02-10 NOTE — TELEPHONE ENCOUNTER
----- Message from Manuel Ballesteros sent at 2/10/2022 12:57 PM CST -----      Name of Who is Calling: JAQUI RICHARDSON JR. [[id number]]      What is the request in detail: Pt returned call to the office.Please contact to further discuss and advise.          Can the clinic reply by [facility]: N      What Number to Call Back if not in [facility]: [phone]

## 2022-02-10 NOTE — PROCEDURES
Small Joint Aspiration/Injection: L index MCP    Date/Time: 2/8/2022 1:30 PM  Performed by: Kaye Solis MD  Authorized by: Kaye Solis MD     Consent Done?:  Yes (Verbal)  Indications:  Pain  Timeout: prior to procedure the correct patient, procedure, and site was verified    Prep: patient was prepped and draped in usual sterile fashion      Local anesthesia used?: Yes    Anesthesia:  Local infiltration  Local anesthetic:  Lidocaine 1% without epinephrine  Location:  Index finger  Site:  L index MCP  Needle size:  25 G  Medications:  40 mg methylPREDNISolone acetate 40 mg/mL  Patient tolerance:  Patient tolerated the procedure well with no immediate complications

## 2022-02-15 ENCOUNTER — TELEPHONE (OUTPATIENT)
Dept: HEMATOLOGY/ONCOLOGY | Facility: CLINIC | Age: 72
End: 2022-02-15
Payer: COMMERCIAL

## 2022-02-15 ENCOUNTER — PATIENT MESSAGE (OUTPATIENT)
Dept: HEMATOLOGY/ONCOLOGY | Facility: CLINIC | Age: 72
End: 2022-02-15
Payer: COMMERCIAL

## 2022-02-15 NOTE — TELEPHONE ENCOUNTER
Attempted to review the results w/o success multiple times. He has views the results on the [employer] portal. Message sent to "[employer].    ----- Message from Aris Rain DNP sent at 1/18/2022 11:48 AM CST -----  Rhea,    Please phone patient with the following:  --Inform patient of genetic testing results as detailed in attached note; and  --Inform patient that a results letter with recommendations has been sent to patient via "[employer] ([facility]).    2.   Please document your call and CC referring provider and me.    3.   Lastly, please ensure that the results report is scanned into chart in Epic.    Thank you,  Aris

## 2022-02-16 ENCOUNTER — OFFICE VISIT (OUTPATIENT)
Dept: PSYCHIATRY | Facility: CLINIC | Age: 72
End: 2022-02-16
Payer: COMMERCIAL

## 2022-02-16 ENCOUNTER — PATIENT MESSAGE (OUTPATIENT)
Dept: HEMATOLOGY/ONCOLOGY | Facility: CLINIC | Age: 72
End: 2022-02-16
Payer: COMMERCIAL

## 2022-02-16 DIAGNOSIS — F43.21 ADJUSTMENT DISORDER WITH DEPRESSED MOOD: Primary | ICD-10-CM

## 2022-02-16 PROCEDURE — 90834 PSYTX W PT 45 MINUTES: CPT | Mod: 95,,, | Performed by: PSYCHOLOGIST

## 2022-02-16 PROCEDURE — 1157F ADVNC CARE PLAN IN RCRD: CPT | Mod: CPTII,95,, | Performed by: PSYCHOLOGIST

## 2022-02-16 PROCEDURE — 1157F PR ADVANCE CARE PLAN OR EQUIV PRESENT IN MEDICAL RECORD: ICD-10-PCS | Mod: CPTII,95,, | Performed by: PSYCHOLOGIST

## 2022-02-16 PROCEDURE — 90834 PR PSYCHOTHERAPY W/PATIENT, 45 MIN: ICD-10-PCS | Mod: 95,,, | Performed by: PSYCHOLOGIST

## 2022-02-17 ENCOUNTER — PATIENT MESSAGE (OUTPATIENT)
Dept: OPTOMETRY | Facility: CLINIC | Age: 72
End: 2022-02-17
Payer: COMMERCIAL

## 2022-02-17 NOTE — PROGRESS NOTES
The patient location is: home in [place], [place]   The chief complaint leading to consultation is: depressed mood; self-esteem     Visit type: audiovisual    Face to Face time with patient: 45  60 minutes of total time spent on the encounter, which includes face to face time and non-face to face time preparing to see the patient (eg, review of tests), Obtaining and/or reviewing separately obtained history, Documenting clinical information in the electronic or other health record, Independently interpreting results (not separately reported) and communicating results to the patient/family/caregiver, or Care coordination (not separately reported).         Each patient to whom he or she provides medical services by telemedicine is:  (1) informed of the relationship between the physician and patient and the respective role of any other health care provider with respect to management of the patient; and (2) notified that he or she may decline to receive medical services by telemedicine and may withdraw from such care at any time.    Notes:       Individual Psychotherapy (PhD/LCSW)    2/16/2022    Site:  [facility]         Therapeutic Intervention: Met with patient.  Outpatient - Insight oriented psychotherapy 45 min - CPT code 75522    Chief complaint/reason for encounter: adjustment; depressed mood      Interval history and content of current session: Pt noted that he had seen Dr. Dennis today who put him back on medication as he was slipping back into depression.n. 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 He remains frustrated by the lack of a diagnosis and tx plan that would offer him hope to regain his musical abilities. 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 Looking back over his life he recognizes significant accomplishments; but they don't add up to his earlier dreams of success. He sees himself as "melancholic" about life and he is frustrated that he can't take a more positive attitudes. We discussed thought experiments that might help him look at things differently and be more accepting of himself and appreciate his accomplishments and current opportunities for fulfillment.     Treatment plan:  · Target symptoms: adjustment  · Why chosen therapy is appropriate versus another modality: relevant to diagnosis, patient responds to this modality  · Outcome monitoring methods: self-report, observation  · Therapeutic intervention type: insight oriented psychotherapy    Risk parameters:  Patient reports no suicidal ideation  Patient reports no homicidal ideation  Patient reports no self-injurious behavior  Patient reports no violent behavior    Verbal deficits: None    Patient's response to intervention:  The patient's response to intervention is accepting.    Progress toward goals and other mental status changes:  The patient's progress toward goals is fair .    Diagnosis: adjustment d/o with depressed mood.     Plan:  individual psychotherapy    Return to clinic: as scheduled    Length of Service (minutes): 45      "

## 2022-02-18 ENCOUNTER — PATIENT MESSAGE (OUTPATIENT)
Dept: HEMATOLOGY/ONCOLOGY | Facility: CLINIC | Age: 72
End: 2022-02-18
Payer: COMMERCIAL

## 2022-02-22 ENCOUNTER — OFFICE VISIT (OUTPATIENT)
Dept: PSYCHIATRY | Facility: CLINIC | Age: 72
End: 2022-02-22
Payer: COMMERCIAL

## 2022-02-22 DIAGNOSIS — F43.21 ADJUSTMENT DISORDER WITH DEPRESSED MOOD: Primary | ICD-10-CM

## 2022-02-22 DIAGNOSIS — G89.4 CHRONIC PAIN SYNDROME: ICD-10-CM

## 2022-02-22 PROCEDURE — 1157F PR ADVANCE CARE PLAN OR EQUIV PRESENT IN MEDICAL RECORD: ICD-10-PCS | Mod: CPTII,95,, | Performed by: PSYCHOLOGIST

## 2022-02-22 PROCEDURE — 90834 PSYTX W PT 45 MINUTES: CPT | Mod: 95,,, | Performed by: PSYCHOLOGIST

## 2022-02-22 PROCEDURE — 90834 PR PSYCHOTHERAPY W/PATIENT, 45 MIN: ICD-10-PCS | Mod: 95,,, | Performed by: PSYCHOLOGIST

## 2022-02-22 PROCEDURE — 1157F ADVNC CARE PLAN IN RCRD: CPT | Mod: CPTII,95,, | Performed by: PSYCHOLOGIST

## 2022-02-22 NOTE — PROGRESS NOTES
The patient location is: home in [place], [place]   The chief complaint leading to consultation is: depressed mood; self-esteem     Visit type: audiovisual    Face to Face time with patient: 45  60 minutes of total time spent on the encounter, which includes face to face time and non-face to face time preparing to see the patient (eg, review of tests), Obtaining and/or reviewing separately obtained history, Documenting clinical information in the electronic or other health record, Independently interpreting results (not separately reported) and communicating results to the patient/family/caregiver, or Care coordination (not separately reported).         Each patient to whom he or she provides medical services by telemedicine is:  (1) informed of the relationship between the physician and patient and the respective role of any other health care provider with respect to management of the patient; and (2) notified that he or she may decline to receive medical services by telemedicine and may withdraw from such care at any time.    Notes:       Individual Psychotherapy (PhD/LCSW)    2/22/2022    Site:  [facility]         Therapeutic Intervention: Met with patient.  Outpatient - Insight oriented psychotherapy 45 min - CPT code 15760    Chief complaint/reason for encounter: adjustment; depressed mood      Interval history and content of current session: Pt focused on his continued problems with chronic pain, the related losses that have resulted from it, and his frustration with getting adequate tx for his condition. Pt believes that a change in his medication regimen could help him with both his pain level and his depression. He is currently at odds with his psychiatrist about what should be tried next. He main consult with his pain management md. He noted that he has a viral condition that comes and goes and which results in increased fatigue and depression. He was contending with this last week but it abated  and he is feeling better emotionally and physically this week. 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 He is also considering a workshop on Happiness. He noted that with his abatement of some of his symptoms this past week he was able to play him musical instrument We also discussed some of his poetry written in the past that would be worthwhile reading over again.     Treatment plan:  · Target symptoms: adjustment  · Why chosen therapy is appropriate versus another modality: relevant to diagnosis, patient responds to this modality  · Outcome monitoring methods: self-report, observation  · Therapeutic intervention type: insight oriented psychotherapy    Risk parameters:  Patient reports no suicidal ideation  Patient reports no homicidal ideation  Patient reports no self-injurious behavior  Patient reports no violent behavior    Verbal deficits: None    Patient's response to intervention:  The patient's response to intervention is accepting.    Progress toward goals and other mental status changes:  The patient's progress toward goals is fair .    Diagnosis: adjustment d/o with depressed mood.     Plan:  individual psychotherapy    Return to clinic: as scheduled    Length of Service (minutes): 45

## 2022-02-27 DIAGNOSIS — K21.00 GERD WITH ESOPHAGITIS: ICD-10-CM

## 2022-02-28 RX ORDER — RABEPRAZOLE SODIUM 20 MG/1
20 TABLET, DELAYED RELEASE ORAL 2 TIMES DAILY
Qty: 180 TABLET | Refills: 3 | Status: SHIPPED | OUTPATIENT
Start: 2022-02-28 | End: 2023-01-29 | Stop reason: SDUPTHER

## 2022-03-02 ENCOUNTER — OFFICE VISIT (OUTPATIENT)
Dept: PSYCHIATRY | Facility: CLINIC | Age: 72
End: 2022-03-02
Payer: COMMERCIAL

## 2022-03-02 DIAGNOSIS — G89.4 CHRONIC PAIN SYNDROME: ICD-10-CM

## 2022-03-02 DIAGNOSIS — F43.21 ADJUSTMENT DISORDER WITH DEPRESSED MOOD: Primary | ICD-10-CM

## 2022-03-02 PROCEDURE — 90834 PSYTX W PT 45 MINUTES: CPT | Mod: 95,,, | Performed by: PSYCHOLOGIST

## 2022-03-02 PROCEDURE — 90834 PR PSYCHOTHERAPY W/PATIENT, 45 MIN: ICD-10-PCS | Mod: 95,,, | Performed by: PSYCHOLOGIST

## 2022-03-02 PROCEDURE — 1157F ADVNC CARE PLAN IN RCRD: CPT | Mod: CPTII,95,, | Performed by: PSYCHOLOGIST

## 2022-03-02 PROCEDURE — 1157F PR ADVANCE CARE PLAN OR EQUIV PRESENT IN MEDICAL RECORD: ICD-10-PCS | Mod: CPTII,95,, | Performed by: PSYCHOLOGIST

## 2022-03-02 NOTE — PROGRESS NOTES
The patient location is: home in [place], [place]   The chief complaint leading to consultation is: depressed mood; self-esteem     Visit type: audiovisual    Face to Face time with patient: 45  60 minutes of total time spent on the encounter, which includes face to face time and non-face to face time preparing to see the patient (eg, review of tests), Obtaining and/or reviewing separately obtained history, Documenting clinical information in the electronic or other health record, Independently interpreting results (not separately reported) and communicating results to the patient/family/caregiver, or Care coordination (not separately reported).         Each patient to whom he or she provides medical services by telemedicine is:  (1) informed of the relationship between the physician and patient and the respective role of any other health care provider with respect to management of the patient; and (2) notified that he or she may decline to receive medical services by telemedicine and may withdraw from such care at any time.    Notes:       Individual Psychotherapy (PhD/LCSW)    3/2/2022    Site:  [facility]         Therapeutic Intervention: Met with patient.  Outpatient - Insight oriented psychotherapy 45 min - CPT code 50641    Chief complaint/reason for encounter: adjustment; depressed mood      Interval history and content of current session: Pt discussed continued frustration and dismay over his chronic pain and the lack of agreement about what, if anything, can be done about it. He was hopeful that Cymbalta would provide duel relief from the pain and his depression but he could not tolerate side effects from the medication so he has gone back to his previous tx regiment for depression (Emsam and Lamictal).per Dr. Dennis. Pt discussed the void in his life related to the inability to perform with other musicians and the way this has exacerbated his depressive tendencies. Noted that pt has success in  the realms of composing and poetry. Discussed strategies for filling the void by shifting the focus of his creative engagement to these other endeavors. Pt asked for a referral to Dr. Bibiana Sanchez to consult about pain management.     Treatment plan:  · Target symptoms: adjustment  · Why chosen therapy is appropriate versus another modality: relevant to diagnosis, patient responds to this modality  · Outcome monitoring methods: self-report, observation  · Therapeutic intervention type: insight oriented psychotherapy    Risk parameters:  Patient reports no suicidal ideation  Patient reports no homicidal ideation  Patient reports no self-injurious behavior  Patient reports no violent behavior    Verbal deficits: None    Patient's response to intervention:  The patient's response to intervention is accepting.    Progress toward goals and other mental status changes:  The patient's progress toward goals is fair .    Diagnosis: adjustment d/o with depressed mood.     Plan:  individual psychotherapy    Return to clinic: as scheduled    Length of Service (minutes): 45

## 2022-03-03 ENCOUNTER — TELEPHONE (OUTPATIENT)
Dept: PSYCHIATRY | Facility: CLINIC | Age: 72
End: 2022-03-03
Payer: COMMERCIAL

## 2022-03-03 NOTE — TELEPHONE ENCOUNTER
Patient called back and scheduled him for NP visit on 02/23/2022 @ 11 AM.  Directions to clinic were given

## 2022-03-05 ENCOUNTER — PES CALL (OUTPATIENT)
Dept: ADMINISTRATIVE | Facility: CLINIC | Age: 72
End: 2022-03-05
Payer: COMMERCIAL

## 2022-03-08 ENCOUNTER — OFFICE VISIT (OUTPATIENT)
Dept: PSYCHIATRY | Facility: CLINIC | Age: 72
End: 2022-03-08
Payer: COMMERCIAL

## 2022-03-08 DIAGNOSIS — F43.21 ADJUSTMENT DISORDER WITH DEPRESSED MOOD: Primary | ICD-10-CM

## 2022-03-08 PROCEDURE — 1157F ADVNC CARE PLAN IN RCRD: CPT | Mod: CPTII,95,, | Performed by: PSYCHOLOGIST

## 2022-03-08 PROCEDURE — 1157F PR ADVANCE CARE PLAN OR EQUIV PRESENT IN MEDICAL RECORD: ICD-10-PCS | Mod: CPTII,95,, | Performed by: PSYCHOLOGIST

## 2022-03-08 PROCEDURE — 90834 PSYTX W PT 45 MINUTES: CPT | Mod: 95,,, | Performed by: PSYCHOLOGIST

## 2022-03-08 PROCEDURE — 90834 PR PSYCHOTHERAPY W/PATIENT, 45 MIN: ICD-10-PCS | Mod: 95,,, | Performed by: PSYCHOLOGIST

## 2022-03-08 NOTE — PROGRESS NOTES
"    The patient location is: home in [place], [place]   The chief complaint leading to consultation is: depressed mood; self-esteem     Visit type: audiovisual    Face to Face time with patient: 45  60 minutes of total time spent on the encounter, which includes face to face time and non-face to face time preparing to see the patient (eg, review of tests), Obtaining and/or reviewing separately obtained history, Documenting clinical information in the electronic or other health record, Independently interpreting results (not separately reported) and communicating results to the patient/family/caregiver, or Care coordination (not separately reported).         Each patient to whom he or she provides medical services by telemedicine is:  (1) informed of the relationship between the physician and patient and the respective role of any other health care provider with respect to management of the patient; and (2) notified that he or she may decline to receive medical services by telemedicine and may withdraw from such care at any time.    Notes:       Individual Psychotherapy (PhD/LCSW)    3/8/2022    Site:  [facility]         Therapeutic Intervention: Met with patient.  Outpatient - Insight oriented psychotherapy 45 min - CPT code 80133    Chief complaint/reason for encounter: adjustment; depressed mood      Interval history and content of current session: Pt discussed thoughts about turning 72 next month and related experience of "shame" when he thinks about ways that he has not accomplished what he hoped he would by this time in his life. He also addressed uncertainty about possible half-way in order to give more time to his artistic pursuits (music, writing). Worked with him to re-frame the meaning of shame and the way it relates to his expectations of self. Suggested the way his experience of  shame is connected to pride vs the way acceptance (of being a flawed human) is connected to humility.    n  Treatment " plan:  · Target symptoms: adjustment  · Why chosen therapy is appropriate versus another modality: relevant to diagnosis, patient responds to this modality  · Outcome monitoring methods: self-report, observation  · Therapeutic intervention type: insight oriented psychotherapy    Risk parameters:  Patient reports no suicidal ideation  Patient reports no homicidal ideation  Patient reports no self-injurious behavior  Patient reports no violent behavior    Verbal deficits: None    Patient's response to intervention:  The patient's response to intervention is accepting.    Progress toward goals and other mental status changes:  The patient's progress toward goals is fair .    Diagnosis: adjustment d/o with depressed mood.     Plan:  individual psychotherapy    Return to clinic: as scheduled    Length of Service (minutes): 45

## 2022-03-09 ENCOUNTER — PATIENT MESSAGE (OUTPATIENT)
Dept: NEUROSURGERY | Facility: CLINIC | Age: 72
End: 2022-03-09
Payer: COMMERCIAL

## 2022-03-09 ENCOUNTER — TELEPHONE (OUTPATIENT)
Dept: NEUROSURGERY | Facility: CLINIC | Age: 72
End: 2022-03-09
Payer: COMMERCIAL

## 2022-03-09 DIAGNOSIS — M54.2 NECK PAIN: ICD-10-CM

## 2022-03-09 DIAGNOSIS — M54.9 BACK PAIN, UNSPECIFIED BACK LOCATION, UNSPECIFIED BACK PAIN LATERALITY, UNSPECIFIED CHRONICITY: ICD-10-CM

## 2022-03-09 DIAGNOSIS — M48.02 CERVICAL SPINAL STENOSIS: Primary | ICD-10-CM

## 2022-03-10 ENCOUNTER — PATIENT MESSAGE (OUTPATIENT)
Dept: PSYCHIATRY | Facility: CLINIC | Age: 72
End: 2022-03-10
Payer: COMMERCIAL

## 2022-03-14 ENCOUNTER — PATIENT MESSAGE (OUTPATIENT)
Dept: PSYCHIATRY | Facility: CLINIC | Age: 72
End: 2022-03-14
Payer: COMMERCIAL

## 2022-03-14 ENCOUNTER — PROCEDURE VISIT (OUTPATIENT)
Dept: NEUROLOGY | Facility: CLINIC | Age: 72
End: 2022-03-14
Payer: COMMERCIAL

## 2022-03-14 DIAGNOSIS — G57.01 NEUROPATHY OF RIGHT SCIATIC NERVE: Primary | ICD-10-CM

## 2022-03-14 DIAGNOSIS — M21.372 FOOT DROP, BILATERAL: ICD-10-CM

## 2022-03-14 DIAGNOSIS — M21.371 FOOT DROP, BILATERAL: ICD-10-CM

## 2022-03-14 PROCEDURE — 95911 NRV CNDJ TEST 9-10 STUDIES: CPT | Mod: S$GLB,,, | Performed by: PSYCHIATRY & NEUROLOGY

## 2022-03-14 PROCEDURE — 95886 PR EMG COMPLETE, W/ NERVE CONDUCTION STUDIES, 5+ MUSCLES: ICD-10-PCS | Mod: S$GLB,,, | Performed by: PSYCHIATRY & NEUROLOGY

## 2022-03-14 PROCEDURE — 95911 PR NERVE CONDUCTION STUDY; 9-10 STUDIES: ICD-10-PCS | Mod: S$GLB,,, | Performed by: PSYCHIATRY & NEUROLOGY

## 2022-03-14 PROCEDURE — 95886 MUSC TEST DONE W/N TEST COMP: CPT | Mod: S$GLB,,, | Performed by: PSYCHIATRY & NEUROLOGY

## 2022-03-15 ENCOUNTER — PATIENT OUTREACH (OUTPATIENT)
Dept: ADMINISTRATIVE | Facility: OTHER | Age: 72
End: 2022-03-15
Payer: COMMERCIAL

## 2022-03-15 NOTE — PROGRESS NOTES
Health Maintenance Due   Topic Date Due    Shingles Vaccine (3 of 3) 11/17/2021    DEXA Scan  03/06/2022     Updates were requested from care everywhere.  Chart was reviewed for overdue Proactive [facility] Encounters ([facility]) topics (CRS, Breast Cancer Screening, Eye exam)  Health Maintenance has been updated.  LINKS immunization registry triggered.  Immunizations were reconciled.

## 2022-03-16 ENCOUNTER — OFFICE VISIT (OUTPATIENT)
Dept: RHEUMATOLOGY | Facility: CLINIC | Age: 72
End: 2022-03-16
Payer: COMMERCIAL

## 2022-03-16 VITALS
HEART RATE: 79 BPM | DIASTOLIC BLOOD PRESSURE: 67 MMHG | BODY MASS INDEX: 27.96 KG/M2 | WEIGHT: 184.5 LBS | HEIGHT: 68 IN | SYSTOLIC BLOOD PRESSURE: 105 MMHG

## 2022-03-16 DIAGNOSIS — M81.0 AGE-RELATED OSTEOPOROSIS WITHOUT CURRENT PATHOLOGICAL FRACTURE: ICD-10-CM

## 2022-03-16 DIAGNOSIS — M79.7 FIBROMYALGIA: Primary | ICD-10-CM

## 2022-03-16 DIAGNOSIS — M15.9 PRIMARY OSTEOARTHRITIS INVOLVING MULTIPLE JOINTS: ICD-10-CM

## 2022-03-16 PROBLEM — M21.371 FOOT DROP, BILATERAL: Status: RESOLVED | Noted: 2021-08-12 | Resolved: 2022-03-16

## 2022-03-16 PROBLEM — M21.372 FOOT DROP, BILATERAL: Status: RESOLVED | Noted: 2021-08-12 | Resolved: 2022-03-16

## 2022-03-16 PROBLEM — M25.531 PAIN IN RIGHT WRIST: Status: RESOLVED | Noted: 2017-07-28 | Resolved: 2022-03-16

## 2022-03-16 PROBLEM — Z01.818 PREOPERATIVE TESTING: Status: RESOLVED | Noted: 2020-08-20 | Resolved: 2022-03-16

## 2022-03-16 PROCEDURE — 1125F PR PAIN SEVERITY QUANTIFIED, PAIN PRESENT: ICD-10-PCS | Mod: CPTII,S$GLB,, | Performed by: INTERNAL MEDICINE

## 2022-03-16 PROCEDURE — 1125F AMNT PAIN NOTED PAIN PRSNT: CPT | Mod: CPTII,S$GLB,, | Performed by: INTERNAL MEDICINE

## 2022-03-16 PROCEDURE — 3074F SYST BP LT 130 MM HG: CPT | Mod: CPTII,S$GLB,, | Performed by: INTERNAL MEDICINE

## 2022-03-16 PROCEDURE — 99999 PR PBB SHADOW E&M-EST. PATIENT-LVL V: ICD-10-PCS | Mod: PBBFAC,,, | Performed by: INTERNAL MEDICINE

## 2022-03-16 PROCEDURE — 3074F PR MOST RECENT SYSTOLIC BLOOD PRESSURE < 130 MM HG: ICD-10-PCS | Mod: CPTII,S$GLB,, | Performed by: INTERNAL MEDICINE

## 2022-03-16 PROCEDURE — 1157F ADVNC CARE PLAN IN RCRD: CPT | Mod: CPTII,S$GLB,, | Performed by: INTERNAL MEDICINE

## 2022-03-16 PROCEDURE — 1159F PR MEDICATION LIST DOCUMENTED IN MEDICAL RECORD: ICD-10-PCS | Mod: CPTII,S$GLB,, | Performed by: INTERNAL MEDICINE

## 2022-03-16 PROCEDURE — 99204 PR OFFICE/OUTPT VISIT, NEW, LEVL IV, 45-59 MIN: ICD-10-PCS | Mod: S$GLB,,, | Performed by: INTERNAL MEDICINE

## 2022-03-16 PROCEDURE — 99999 PR PBB SHADOW E&M-EST. PATIENT-LVL V: CPT | Mod: PBBFAC,,, | Performed by: INTERNAL MEDICINE

## 2022-03-16 PROCEDURE — 1160F RVW MEDS BY RX/DR IN RCRD: CPT | Mod: CPTII,S$GLB,, | Performed by: INTERNAL MEDICINE

## 2022-03-16 PROCEDURE — 3008F BODY MASS INDEX DOCD: CPT | Mod: CPTII,S$GLB,, | Performed by: INTERNAL MEDICINE

## 2022-03-16 PROCEDURE — 3008F PR BODY MASS INDEX (BMI) DOCUMENTED: ICD-10-PCS | Mod: CPTII,S$GLB,, | Performed by: INTERNAL MEDICINE

## 2022-03-16 PROCEDURE — 99204 OFFICE O/P NEW MOD 45 MIN: CPT | Mod: S$GLB,,, | Performed by: INTERNAL MEDICINE

## 2022-03-16 PROCEDURE — 1160F PR REVIEW ALL MEDS BY PRESCRIBER/CLIN PHARMACIST DOCUMENTED: ICD-10-PCS | Mod: CPTII,S$GLB,, | Performed by: INTERNAL MEDICINE

## 2022-03-16 PROCEDURE — 3078F PR MOST RECENT DIASTOLIC BLOOD PRESSURE < 80 MM HG: ICD-10-PCS | Mod: CPTII,S$GLB,, | Performed by: INTERNAL MEDICINE

## 2022-03-16 PROCEDURE — 1157F PR ADVANCE CARE PLAN OR EQUIV PRESENT IN MEDICAL RECORD: ICD-10-PCS | Mod: CPTII,S$GLB,, | Performed by: INTERNAL MEDICINE

## 2022-03-16 PROCEDURE — 1159F MED LIST DOCD IN RCRD: CPT | Mod: CPTII,S$GLB,, | Performed by: INTERNAL MEDICINE

## 2022-03-16 PROCEDURE — 3078F DIAST BP <80 MM HG: CPT | Mod: CPTII,S$GLB,, | Performed by: INTERNAL MEDICINE

## 2022-03-16 RX ORDER — CLINDAMYCIN HYDROCHLORIDE 150 MG/1
150 CAPSULE ORAL 4 TIMES DAILY
COMMUNITY
Start: 2021-11-23 | End: 2022-06-20

## 2022-03-16 ASSESSMENT — ROUTINE ASSESSMENT OF PATIENT INDEX DATA (RAPID3)
FATIGUE SCORE: 6.5
PSYCHOLOGICAL DISTRESS SCORE: 2.2
MDHAQ FUNCTION SCORE: 1.3
PATIENT GLOBAL ASSESSMENT SCORE: 8.5
TOTAL RAPID3 SCORE: 6.78
PAIN SCORE: 7.5

## 2022-03-16 NOTE — ASSESSMENT & PLAN NOTE
He has minimal osteoarthritis and is being treated for identified cervical and lumbar degenerative disc and facet disease

## 2022-03-16 NOTE — PROGRESS NOTES
"Subjective:       Patient ID: Raffy Rutherford Jr. is a 71 y.o. male.    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    Goes to PT 3/week at [employer] in [place]  Averages 6K steps / day but not lately    Plays upright or electric base; redesigned a cello as is easier  Has been composing while COVID preventing playing    Takes Lyrica, 200 mg at night; groggy in am and has pushed higher without benefit    Did not tolerate Cymbalta  Uses an MAOI patch for depression (was off anti-depressants from about Sept to Feb)    PMH:  Prostate cancer dx last summer; currently watching and waiting  High cholesterol on pravastatin long time  Idiopathic progressive polyneuropathy, 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 Fibromyalgia    2. Primary osteoarthritis involving multiple joints    3. Age-related osteoporosis without current pathological fracture        Will plan to get labs acutely the next time he has acute fatigue and flu like sx    Plan:       Problem List Items Addressed This Visit        Active Problems    Fibromyalgia - Primary     While it is difficult to " distinguish peripheral neuropathic pain from his peripheral neuropathy from central pain amplification that characterizes fibromyalgia, he has episodic fatigue and body pain and sleep disorder typical of fms and is on pregabalin which is appropriate Rx for fms and peripheral neuropathy. There is little else I can offer except to reinforce healthy lifestyle with nutrition, stress management, sleep hygiene and exercise, all of which he is doing.            Relevant Orders    CBC Auto Differential    CK    Comprehensive Metabolic Panel    C-Reactive Protein    OP (osteoporosis)     Discussed his BMD which is low but not osteoporotic and his fractures were traumatic; discussed risk and benefit of anti-resorptive and he will plan f/u dxa before starting such           Osteoarthritis     He has minimal osteoarthritis and is being treated for identified cervical and lumbar degenerative disc and facet disease

## 2022-03-16 NOTE — ASSESSMENT & PLAN NOTE
Discussed his BMD which is low but not osteoporotic and his fractures were traumatic; discussed risk and benefit of anti-resorptive and he will plan f/u dxa before starting such

## 2022-03-16 NOTE — PATIENT INSTRUCTIONS
Dr Efren Ann, [facility].   Maintaining a Healthy Immune System: What you can do to help  [email or web address]    Also visit [email or web address]

## 2022-03-16 NOTE — ASSESSMENT & PLAN NOTE
While it is difficult to distinguish peripheral neuropathic pain from his peripheral neuropathy from central pain amplification that characterizes fibromyalgia, he has episodic fatigue and body pain and sleep disorder typical of fms and is on pregabalin which is appropriate Rx for fms and peripheral neuropathy. There is little else I can offer except to reinforce healthy lifestyle with nutrition, stress management, sleep hygiene and exercise, all of which he is doing.
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
## 2022-03-23 ENCOUNTER — OFFICE VISIT (OUTPATIENT)
Dept: PSYCHIATRY | Facility: CLINIC | Age: 72
End: 2022-03-23
Payer: COMMERCIAL

## 2022-03-23 DIAGNOSIS — F34.1 PERSISTENT DEPRESSIVE DISORDER: ICD-10-CM

## 2022-03-23 DIAGNOSIS — F41.9 ANXIETY: Primary | ICD-10-CM

## 2022-03-23 DIAGNOSIS — G89.4 CHRONIC PAIN SYNDROME: ICD-10-CM

## 2022-03-23 PROCEDURE — 1157F ADVNC CARE PLAN IN RCRD: CPT | Mod: CPTII,95,, | Performed by: PSYCHOLOGIST

## 2022-03-23 PROCEDURE — 1157F PR ADVANCE CARE PLAN OR EQUIV PRESENT IN MEDICAL RECORD: ICD-10-PCS | Mod: CPTII,95,, | Performed by: PSYCHOLOGIST

## 2022-03-23 PROCEDURE — 90791 PSYCH DIAGNOSTIC EVALUATION: CPT | Mod: 95,,, | Performed by: PSYCHOLOGIST

## 2022-03-23 PROCEDURE — 90791 PR PSYCHIATRIC DIAGNOSTIC EVALUATION: ICD-10-PCS | Mod: 95,,, | Performed by: PSYCHOLOGIST

## 2022-03-25 ENCOUNTER — LAB VISIT (OUTPATIENT)
Dept: LAB | Facility: HOSPITAL | Age: 72
End: 2022-03-25
Attending: INTERNAL MEDICINE
Payer: COMMERCIAL

## 2022-03-25 DIAGNOSIS — M79.7 FIBROMYALGIA: ICD-10-CM

## 2022-03-25 LAB
ALBUMIN SERPL BCP-MCNC: 4 G/DL (ref 3.5–5.2)
ALP SERPL-CCNC: 43 U/L (ref 55–135)
ALT SERPL W/O P-5'-P-CCNC: 15 U/L (ref 10–44)
ANION GAP SERPL CALC-SCNC: 10 MMOL/L (ref 8–16)
AST SERPL-CCNC: 17 U/L (ref 10–40)
BASOPHILS # BLD AUTO: 0.03 K/UL (ref 0–0.2)
BASOPHILS NFR BLD: 0.5 % (ref 0–1.9)
BILIRUB SERPL-MCNC: 0.5 MG/DL (ref 0.1–1)
BUN SERPL-MCNC: 14 MG/DL (ref 8–23)
CALCIUM SERPL-MCNC: 9.6 MG/DL (ref 8.7–10.5)
CHLORIDE SERPL-SCNC: 103 MMOL/L (ref 95–110)
CK SERPL-CCNC: 143 U/L (ref 20–200)
CO2 SERPL-SCNC: 27 MMOL/L (ref 23–29)
CREAT SERPL-MCNC: 0.8 MG/DL (ref 0.5–1.4)
CRP SERPL-MCNC: 0.7 MG/L (ref 0–8.2)
DIFFERENTIAL METHOD: NORMAL
EOSINOPHIL # BLD AUTO: 0.1 K/UL (ref 0–0.5)
EOSINOPHIL NFR BLD: 1.8 % (ref 0–8)
ERYTHROCYTE [DISTWIDTH] IN BLOOD BY AUTOMATED COUNT: 12.6 % (ref 11.5–14.5)
EST. GFR  (AFRICAN AMERICAN): >60 ML/MIN/1.73 M^2
EST. GFR  (NON AFRICAN AMERICAN): >60 ML/MIN/1.73 M^2
GLUCOSE SERPL-MCNC: 129 MG/DL (ref 70–110)
HCT VFR BLD AUTO: 47.5 % (ref 40–54)
HGB BLD-MCNC: 15.2 G/DL (ref 14–18)
IMM GRANULOCYTES # BLD AUTO: 0.01 K/UL (ref 0–0.04)
IMM GRANULOCYTES NFR BLD AUTO: 0.2 % (ref 0–0.5)
LYMPHOCYTES # BLD AUTO: 2.2 K/UL (ref 1–4.8)
LYMPHOCYTES NFR BLD: 37.6 % (ref 18–48)
MCH RBC QN AUTO: 30.5 PG (ref 27–31)
MCHC RBC AUTO-ENTMCNC: 32 G/DL (ref 32–36)
MCV RBC AUTO: 95 FL (ref 82–98)
MONOCYTES # BLD AUTO: 0.4 K/UL (ref 0.3–1)
MONOCYTES NFR BLD: 6.7 % (ref 4–15)
NEUTROPHILS # BLD AUTO: 3.2 K/UL (ref 1.8–7.7)
NEUTROPHILS NFR BLD: 53.2 % (ref 38–73)
NRBC BLD-RTO: 0 /100 WBC
PLATELET # BLD AUTO: 270 K/UL (ref 150–450)
PMV BLD AUTO: 9.9 FL (ref 9.2–12.9)
POTASSIUM SERPL-SCNC: 4.2 MMOL/L (ref 3.5–5.1)
PROT SERPL-MCNC: 7.1 G/DL (ref 6–8.4)
RBC # BLD AUTO: 4.98 M/UL (ref 4.6–6.2)
SODIUM SERPL-SCNC: 140 MMOL/L (ref 136–145)
WBC # BLD AUTO: 5.95 K/UL (ref 3.9–12.7)

## 2022-03-25 PROCEDURE — 86140 C-REACTIVE PROTEIN: CPT | Performed by: INTERNAL MEDICINE

## 2022-03-25 PROCEDURE — 36415 COLL VENOUS BLD VENIPUNCTURE: CPT | Performed by: INTERNAL MEDICINE

## 2022-03-25 PROCEDURE — 80053 COMPREHEN METABOLIC PANEL: CPT | Performed by: INTERNAL MEDICINE

## 2022-03-25 PROCEDURE — 82550 ASSAY OF CK (CPK): CPT | Performed by: INTERNAL MEDICINE

## 2022-03-25 PROCEDURE — 85025 COMPLETE CBC W/AUTO DIFF WBC: CPT | Performed by: INTERNAL MEDICINE

## 2022-03-31 ENCOUNTER — OFFICE VISIT (OUTPATIENT)
Dept: PSYCHIATRY | Facility: CLINIC | Age: 72
End: 2022-03-31
Payer: COMMERCIAL

## 2022-03-31 DIAGNOSIS — G89.4 CHRONIC PAIN SYNDROME: ICD-10-CM

## 2022-03-31 DIAGNOSIS — F41.1 GAD (GENERALIZED ANXIETY DISORDER): ICD-10-CM

## 2022-03-31 DIAGNOSIS — F34.1 PERSISTENT DEPRESSIVE DISORDER: Primary | ICD-10-CM

## 2022-03-31 PROCEDURE — 1157F ADVNC CARE PLAN IN RCRD: CPT | Mod: CPTII,95,, | Performed by: PSYCHOLOGIST

## 2022-03-31 PROCEDURE — 1157F PR ADVANCE CARE PLAN OR EQUIV PRESENT IN MEDICAL RECORD: ICD-10-PCS | Mod: CPTII,95,, | Performed by: PSYCHOLOGIST

## 2022-03-31 PROCEDURE — 90834 PSYTX W PT 45 MINUTES: CPT | Mod: 95,,, | Performed by: PSYCHOLOGIST

## 2022-03-31 PROCEDURE — 90834 PR PSYCHOTHERAPY W/PATIENT, 45 MIN: ICD-10-PCS | Mod: 95,,, | Performed by: PSYCHOLOGIST
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
## 2022-04-04 ENCOUNTER — HOSPITAL ENCOUNTER (OUTPATIENT)
Dept: RADIOLOGY | Facility: HOSPITAL | Age: 72
Discharge: HOME | End: 2022-04-04
Attending: PHYSICIAN ASSISTANT
Payer: COMMERCIAL

## 2022-04-04 ENCOUNTER — OFFICE VISIT (OUTPATIENT)
Dept: NEUROSURGERY | Facility: CLINIC | Age: 72
End: 2022-04-04
Payer: COMMERCIAL

## 2022-04-04 VITALS — WEIGHT: 184.5 LBS | BODY MASS INDEX: 27.96 KG/M2 | HEIGHT: 68 IN

## 2022-04-04 DIAGNOSIS — M48.02 CERVICAL SPINAL STENOSIS: ICD-10-CM

## 2022-04-04 DIAGNOSIS — M54.2 NECK PAIN: ICD-10-CM

## 2022-04-04 DIAGNOSIS — G60.0 DISTAL HEREDITARY MOTOR NEUROPATHY: ICD-10-CM

## 2022-04-04 DIAGNOSIS — M53.3 SACROILIAC JOINT DYSFUNCTION OF RIGHT SIDE: Primary | ICD-10-CM

## 2022-04-04 DIAGNOSIS — M54.9 DORSALGIA, UNSPECIFIED: ICD-10-CM

## 2022-04-04 DIAGNOSIS — M54.9 BACK PAIN, UNSPECIFIED BACK LOCATION, UNSPECIFIED BACK PAIN LATERALITY, UNSPECIFIED CHRONICITY: ICD-10-CM

## 2022-04-04 PROCEDURE — 72110 XR LUMBAR SPINE AP AND LAT WITH FLEX/EXT: ICD-10-PCS | Mod: 26,,, | Performed by: RADIOLOGY

## 2022-04-04 PROCEDURE — 3288F FALL RISK ASSESSMENT DOCD: CPT | Mod: CPTII,S$GLB,, | Performed by: NEUROLOGICAL SURGERY

## 2022-04-04 PROCEDURE — 99215 PR OFFICE/OUTPT VISIT, EST, LEVL V, 40-54 MIN: ICD-10-PCS | Mod: S$GLB,,, | Performed by: NEUROLOGICAL SURGERY

## 2022-04-04 PROCEDURE — 1100F PR PT FALLS ASSESS DOC 2+ FALLS/FALL W/INJURY/YR: ICD-10-PCS | Mod: CPTII,S$GLB,, | Performed by: NEUROLOGICAL SURGERY

## 2022-04-04 PROCEDURE — 1125F PR PAIN SEVERITY QUANTIFIED, PAIN PRESENT: ICD-10-PCS | Mod: CPTII,S$GLB,, | Performed by: NEUROLOGICAL SURGERY

## 2022-04-04 PROCEDURE — 72110 X-RAY EXAM L-2 SPINE 4/>VWS: CPT | Mod: 26,,, | Performed by: RADIOLOGY

## 2022-04-04 PROCEDURE — 3008F BODY MASS INDEX DOCD: CPT | Mod: CPTII,S$GLB,, | Performed by: NEUROLOGICAL SURGERY

## 2022-04-04 PROCEDURE — 72050 XR CERVICAL SPINE AP LAT WITH FLEX EXTEN: ICD-10-PCS | Mod: 26,,, | Performed by: RADIOLOGY

## 2022-04-04 PROCEDURE — 72110 X-RAY EXAM L-2 SPINE 4/>VWS: CPT | Mod: TC,PN

## 2022-04-04 PROCEDURE — 1100F PTFALLS ASSESS-DOCD GE2>/YR: CPT | Mod: CPTII,S$GLB,, | Performed by: NEUROLOGICAL SURGERY

## 2022-04-04 PROCEDURE — 99999 PR PBB SHADOW E&M-EST. PATIENT-LVL IV: ICD-10-PCS | Mod: PBBFAC,,, | Performed by: NEUROLOGICAL SURGERY

## 2022-04-04 PROCEDURE — 1157F PR ADVANCE CARE PLAN OR EQUIV PRESENT IN MEDICAL RECORD: ICD-10-PCS | Mod: CPTII,S$GLB,, | Performed by: NEUROLOGICAL SURGERY

## 2022-04-04 PROCEDURE — 99999 PR PBB SHADOW E&M-EST. PATIENT-LVL IV: CPT | Mod: PBBFAC,,, | Performed by: NEUROLOGICAL SURGERY

## 2022-04-04 PROCEDURE — 72050 X-RAY EXAM NECK SPINE 4/5VWS: CPT | Mod: TC,PN

## 2022-04-04 PROCEDURE — 1125F AMNT PAIN NOTED PAIN PRSNT: CPT | Mod: CPTII,S$GLB,, | Performed by: NEUROLOGICAL SURGERY

## 2022-04-04 PROCEDURE — 72050 X-RAY EXAM NECK SPINE 4/5VWS: CPT | Mod: 26,,, | Performed by: RADIOLOGY

## 2022-04-04 PROCEDURE — 3288F PR FALLS RISK ASSESSMENT DOCUMENTED: ICD-10-PCS | Mod: CPTII,S$GLB,, | Performed by: NEUROLOGICAL SURGERY

## 2022-04-04 PROCEDURE — 99215 OFFICE O/P EST HI 40 MIN: CPT | Mod: S$GLB,,, | Performed by: NEUROLOGICAL SURGERY

## 2022-04-04 PROCEDURE — 1157F ADVNC CARE PLAN IN RCRD: CPT | Mod: CPTII,S$GLB,, | Performed by: NEUROLOGICAL SURGERY

## 2022-04-04 PROCEDURE — 3008F PR BODY MASS INDEX (BMI) DOCUMENTED: ICD-10-PCS | Mod: CPTII,S$GLB,, | Performed by: NEUROLOGICAL SURGERY

## 2022-04-04 NOTE — PROGRESS NOTES
NEUROSURGICAL PROGRESS NOTE    DATE OF SERVICE:  04/04/2022    ATTENDING PHYSICIAN:  Jason Caldwell MD    SUBJECTIVE:     This is a very pleasant 70 y.o. male, who is status postop L5-S1 anterior interbody fusion, stand-alone spacer with BMP in August 2020. He reports significant improvement in his back pain and his mobility.  He has been treated with pain management.  Takes hydrocodone and Lyrica.  Reports some residual right SI joint area back pain when he stands upright for prolonged period of time.  He has been doing physical therapy exercises including stretching and dry needling.  He will try a treatment of cold laser.  No new onset of motor weakness or numbness.    INTERIM HISTORY:    He is about 1 year not have status post anterior interbody fusion for worsening degenerative disc disease with spondylolisthesis and severe foraminal stenosis.  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  He has been having right more than left dorsiflexion weakness and bilateral plantar flexion weakness for several years.  When I compare my physical exam in 2019 with the 1 done today his motor strength is comparable.  However the patient has noted that more recently he has been having more trouble standing upright for long period of time.  He has notice that sometime his legs are giving out under him.  He has not fallen.  He has adapted is shower to prevent falls.  He is able to walk without assistance but sometimes uses a cane.  He has been doing a lot of leg strengthening exercises.  Over the last year and a half for so he had bilateral carpal tunnel and cubital tunnel decompression surgeries with significant improvement in his hand numbness and strength.  Recently has had a EMG showing normal nerve conduction in bilateral upper extremities.  The EMG of the lower extremities is showing chronic denervation and reinnervation changes affecting the bilateral  gastrocnemius, anterior tibialis muscle, vastus medialis and gluteus muscles.    His pain complaints is mainly localized over the right SI joint area and travels down the posterior leg above the knee.  Sitting for prolonged period of time makes the pain worse.  He has had good results in the past with right SI joint block and steroid injection with more than 5 months of pain relief with the 1st injection.              PAST MEDICAL HISTORY:  Active Ambulatory Problems     Diagnosis Date Noted    Depression     Hyperlipidemia     Chronic pain     Adenomatous polyp     GERD (gastroesophageal reflux disease)     Back pain     Sleep apnea     Visual disturbances 10/03/2012    Spondylosis without myelopathy 11/09/2012    Degeneration of lumbar or lumbosacral intervertebral disc 11/09/2012    Spinal stenosis, lumbar region, without neurogenic claudication 11/09/2012    Thoracic or lumbosacral neuritis or radiculitis, unspecified 11/09/2012    Displacement of lumbar intervertebral disc without myelopathy 11/09/2012    Acquired spondylolisthesis 11/09/2012    Lumbago 11/09/2012    Status post cataract extraction and insertion of intraocular lens - Both Eyes 11/13/2012    Fibromyalgia 10/21/2013    OP (osteoporosis) 10/21/2013    Compression fracture of T12 vertebra 10/21/2013    Diverticulitis large intestine 12/21/2013    Osteoarthritis 03/19/2014    Lower back pain 04/01/2014    Lower extremity pain 04/01/2014    Muscle weakness 04/01/2014    Range of motion deficit 04/01/2014    Facet joint disease of cervical region 06/19/2014    Occipital neuralgia 06/19/2014    Chronic migraine without aura, with intractable migraine, so stated, with status migrainosus 06/19/2014    Cervical radiculopathy 06/19/2014    Paroxysmal hemicrania 06/19/2014    Sciatic nerve pain 06/19/2014    Chronic LBP 06/27/2014    DJD (degenerative joint disease) of lumbar spine 06/27/2014    Chronic neck pain 06/27/2014     Right lumbar radiculopathy 06/27/2014    Thyroid nodule 07/16/2014    Carpal tunnel syndrome of right wrist 07/16/2014    Paresthesia 08/01/2014    Degenerative disc disease 06/22/2015    S/P lumbar spinal fusion 12/02/2015    Right wrist tendinitis 07/28/2017    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    Headache around the eyes 06/26/2018    Closed fracture of left distal radius 02/05/2019    Bilateral foot-drop 07/16/2019    Idiopathic peripheral neuropathy 07/16/2019    Sacroiliac joint dysfunction of right side 07/16/2019    SI (sacroiliac) joint dysfunction 09/19/2019    Episodic migraine without status migrainosus, not intractable 10/25/2019    Lumbar disc herniation with radiculopathy 10/25/2019    Anxiety about health 11/12/2019    MDD (major depressive disorder), recurrent episode, moderate 11/12/2019    Anxiety 11/14/2019    History of colon polyps 12/17/2019    Iron deficiency anemia 05/15/2020    Sicca syndrome with keratoconjunctivitis 05/27/2020    DDD (degenerative disc disease), lumbosacral 08/19/2020    Spondylolisthesis at L5-S1 level 08/20/2020    CTS (carpal tunnel syndrome) 12/16/2020    Pain in left hand 05/07/2021    Right carpal tunnel syndrome 05/17/2021    Decreased range of motion of neck 06/20/2021    Poor posture 06/20/2021    Decreased strength of upper extremity 06/20/2021    Prostate cancer 07/22/2021    Impaired gait 08/12/2021    Balance problems 08/12/2021    Genetic disorder 10/07/2021    Bilateral carotid artery stenosis     Other specified diseases of spinal cord 12/07/2021    Chronic pain of left hand 12/14/2021     Resolved Ambulatory Problems     Diagnosis Date Noted    Colon polyp     History of prostate biopsy     Blepharitis of both eyes - Both Eyes 11/13/2012    Prostatitis, acute 12/17/2012    Special screening for malignant neoplasms, colon 05/05/2014    Cervicalgia 06/19/2014    Brow ptosis  02/10/2015    Encounter for postoperative wound check 08/01/2015    Lumbar radiculopathy 09/15/2015    Cervical spondylosis 10/22/2015    Low back pain without sciatica 10/27/2015    Lumbar radicular pain 10/27/2015    Gait abnormality 02/21/2017    Impaired functional mobility, balance, gait, and endurance 02/24/2017    Left hip pain 05/24/2017    Pain in right wrist 07/28/2017    Difficulty walking 11/01/2017    Weakness 11/01/2017    Lumbar back pain with radiculopathy affecting lower extremity 11/15/2018    Pain in left wrist 03/19/2019    Weakness of both lower extremities 11/20/2019    Preoperative testing 08/20/2020    Pain in both hands 11/05/2020    Foot drop, bilateral 08/12/2021     Past Medical History:   Diagnosis Date    Allergy     Arthritis     Cataract     Cluster headache 2013    Colon polyps 2007    Diverticulitis 12/2013    Hepatitis 1970's    Joint pain     Tubular adenoma of colon 2007    Visual disturbance 2012       PAST SURGICAL HISTORY:  Past Surgical History:   Procedure Laterality Date    BACK SURGERY      CARPAL TUNNEL RELEASE Right 5/18/2021    Procedure: RELEASE, CARPAL TUNNEL;  Surgeon: Kaye Solis MD;  Location: [facility] OR;  Service: Orthopedics;  Laterality: Right;    CATARACT EXTRACTION W/  INTRAOCULAR LENS IMPLANT Bilateral     CHOLECYSTECTOMY      COLONOSCOPY N/A 12/17/2019    Normal - Repeat 5yrs    COLONOSCOPY  2007 2007 TA x2, 2011 TA x3, 2014 HP x3, 2019 normal    COSMETIC SURGERY  2/10/2015    Direct mid-forehead brow lift    COSMETIC SURGERY  2/10/2015    Bilateral upper lid blepahroplasty    CYSTOSCOPY WITH URETEROSCOPY, RETROGRADE PYELOGRAPHY, AND INSERTION OF STENT Left 8/19/2020    Procedure: CYSTOSCOPY, WITH RETROGRADE PYELOGRAM AND URETERAL STENT INSERTION;  Surgeon: Katelynn George MD;  Location: [facility] OR;  Service: Urology;  Laterality: Left;    ESOPHAGOGASTRODUODENOSCOPY N/A 12/17/2019    Procedure: ESOPHAGOGASTRODUODENOSCOPY  (EGD);  Surgeon: Amadou Hardin MD;  Location: [facility] ENDO (4TH FLR);  Service: Endoscopy;  Laterality: N/A;    EYE SURGERY      FUSION OF LUMBAR SPINE BY ANTERIOR APPROACH N/A 8/20/2020    Procedure: FUSION, SPINE, LUMBAR, ANTERIOR APPROACH L5-S1 ALIF Stand Alone;  Surgeon: Jason Caldwell MD;  Location: [facility];  Service: Neurosurgery;  Laterality: N/A;    HEMORRHOID SURGERY      with complication of chronic bleeding for 6 weeks     INJECTION OF STEROID Right 12/6/2018    Procedure: INJECTION, STEROID Right SI Joint Block and Steroid Injection;  Surgeon: Jason Caldwell MD;  Location: [facility];  Service: Neurosurgery;  Laterality: Right;  Procedure: Right SI Joint Block and Steroid Injection  Surgery Time: 30 MIN  LOS: 0  Anesthesia: MAC  Radiology: C-arm  Bed: Lawrence 4 Poster  Position: Prone    INJECTION OF STEROID Right 9/19/2019    Procedure: INJECTION, STEROID Procedure: Right SI joint block nd steroid injection;  Surgeon: Jason Caldwell MD;  Location: [facility];  Service: Neurosurgery;  Laterality: Right;  Procedure: Right SI joint block nd steroid injection  Surgery Time: 30 mins  LOS:   Anesthesia: General MAC  Radiology:C-arm  Bed: Regular Bed  Position: Prone    JOINT REPLACEMENT      KNEE SURGERY      involving arthroscopic surgery to both knees    SINUS SURGERY      left molar and sinus surgery for trigeminal neuralgia    SPINAL FUSION  06/22/2015    L3-L5 XLIF/TANA    TOTAL HIP ARTHROPLASTY  4/2012    Pt states he had total hip replacement on his left hip.    ULNAR NERVE TRANSPOSITION Left 12/16/2020    Procedure: TRANSPOSITION, NERVE, ULNAR - left carpal and cubital tunnel releases;  Surgeon: Addi Bauer MD;  Location: [facility];  Service: Orthopedics;  Laterality: Left;       SOCIAL HISTORY:   Social History     Socioeconomic History    Marital status:    Occupational History    Occupation: Psychotherpist    Tobacco Use    Smoking status: Never Smoker    Smokeless  tobacco: Never Used   Substance and Sexual Activity    Alcohol use: Yes     Comment: occasion    Drug use: No    Sexual activity: Yes     Partners: Female     Social Determinants of Health     Financial Resource Strain: Low Risk     Difficulty of Paying Living Expenses: Not hard at all   Food Insecurity: No Food Insecurity    Worried About Running Out of Food in the Last Year: Never true    Ran Out of Food in the Last Year: Never true   Transportation Needs: No Transportation Needs    Lack of Transportation (Medical): No    Lack of Transportation (Non-Medical): No   Physical Activity: Insufficiently Active    Days of Exercise per Week: 3 days    Minutes of Exercise per Session: 30 min   Stress: No Stress Concern Present    Feeling of Stress : Only a little   Social Connections: Unknown    Frequency of Communication with Friends and Family: Patient refused    Frequency of Social Gatherings with Friends and Family: Patient refused    Active Member of Clubs or Organizations: Patient refused    Marital Status:        FAMILY HISTORY:  Family History   Problem Relation Age of Onset    Stroke Mother 86    Colon cancer Mother         early/mid-60s    Uterine cancer Mother 77    Pancreatitis Brother         acute, now s/p nathalia    Diabetes Brother     Macular degeneration Brother     Other Brother         foot drop    Other Father 44        pituitary tumor- CA vs benign?    Heart attack Maternal Grandfather         suspected, 50s    Migraines Sister     Crohn's disease Sister         w/ assoc joint issues    Arthritis Sister     Tremor Daughter     Irritable bowel syndrome Son         leaning toward Crohn's dx    Neurological Disorders Son         nonspecific, benign fasciculations of calves    Tremor Son     Jaundice Grandchild     Other Maternal Uncle         memory issue    Other Maternal Uncle         memory issue    Cancer Maternal Cousin         origin? maybe thyroid? 40s?     Melanoma Neg Hx     Amblyopia Neg Hx     Blindness Neg Hx     Cataracts Neg Hx     Glaucoma Neg Hx     Hypertension Neg Hx     Retinal detachment Neg Hx     Strabismus Neg Hx     Thyroid disease Neg Hx     Allergic rhinitis Neg Hx     Allergies Neg Hx     Angioedema Neg Hx     Asthma Neg Hx     Eczema Neg Hx     Urticaria Neg Hx     Rhinitis Neg Hx     Immunodeficiency Neg Hx     Atopy Neg Hx        CURRENTS MEDICATIONS:  Current Outpatient Medications on File Prior to Visit   Medication Sig Dispense Refill    clindamycin (CLEOCIN) 150 MG capsule Take 150 mg by mouth 4 (four) times daily.      clonazePAM (KLONOPIN) 0.5 MG tablet Take 1 tablet by mouth twice a day as needed for anxiety 60 tablet 5    clonazePAM (KLONOPIN) 0.5 MG tablet Take 1 tablet by mouth twice a day as needed for anxiety 60 tablet 5    DULoxetine (CYMBALTA) 20 MG capsule TAKE 2 CAPSULES BY MOUTH DAILY 60 capsule 5    HYDROcodone-acetaminophen (NORCO) 5-325 mg per tablet Take 1 tablet by mouth three times daily as needed for pain. 90 tablet 0    lamoTRIgine (LAMICTAL) 100 MG tablet Take 2 tablets by mouth once a day 180 tablet 3    lamoTRIgine (LAMICTAL) 200 MG tablet Take 1 tablet (200 mg total) by mouth once daily. 90 tablet 3    pilocarpine HCl (VUITY) 1.25 % Drop Apply 1 drop to each eye once daily. 5 mL 6    pravastatin (PRAVACHOL) 40 MG tablet Take 1 tablet (40 mg total) by mouth once daily. 90 tablet 3    pregabalin (LYRICA) 25 MG capsule Take 1 capsule by mouth twice daily during the day and 1 capsule at bedtime 90 capsule 2    pregabalin (LYRICA) 75 MG capsule Take 1 capsule(s) twice a day by oral route in the evening for 30 days. 60 capsule 3    pregabalin (LYRICA) 75 MG capsule Take 3 capsules by mouth at night 90 capsule 2    RABEprazole (ACIPHEX) 20 mg tablet Take 1 tablet (20 mg total) by mouth 2 (two) times daily. 180 tablet 3    rimegepant (NURTEC) 75 mg odt Take 1 tablet (75 mg total) by mouth every  other day. Place ODT tablet on the tongue; alternatively the ODT tablet may be placed under the tongue 16 tablet 2    rimegepant (NURTEC) 75 mg odt Take 1 tablet every day by mouth as directed for 30 days. 8 tablet 3    rimegepant (NURTEC) 75 mg odt Take 1 tablet every day by oral route as directed for 30 days. 8 tablet 3    tadalafiL (CIALIS) 5 MG tablet Take 1 tablet (5 mg total) by mouth daily as needed for Erectile Dysfunction. 120 tablet 3    traZODone (DESYREL) 100 MG tablet Take 1 tablet by mouth every night at bedtime 90 tablet 3    varenicline (TYRVAYA) 0.03 mg/spray sprm 1 spray by Nasal route 2 (two) times a day. 4.2 mL 6    [DISCONTINUED] selegiline (EMSAM) 6 mg/24 hr Place 1 patch onto the skin once daily - TOLD TO WASHOUT TRAZODONE FIRST 30 patch 11     Current Facility-Administered Medications on File Prior to Visit   Medication Dose Route Frequency Provider Last Rate Last Admin    fentaNYL injection 25 mcg  25 mcg Intravenous Q5 Min PRN Cici Lieberman MD   50 mcg at 12/16/20 0745    midazolam (VERSED) 1 mg/mL injection 0.5 mg  0.5 mg Intravenous PRN Cici Lieberman MD   1 mg at 12/16/20 0749       ALLERGIES:  Review of patient's allergies indicates:   Allergen Reactions    Alphagan [brimonidine]      Patient taking MASO-B Selective Inhibitor Selegiline (Emsam)    Coumadin [warfarin]      itch    Oxycodone      hiccups       REVIEW OF SYSTEMS:  Review of Systems   Constitutional: Negative for diaphoresis, fever and weight loss.   Respiratory: Negative for shortness of breath.    Cardiovascular: Negative for chest pain.   Gastrointestinal: Negative for blood in stool.   Genitourinary: Negative for hematuria.   Endo/Heme/Allergies: Does not bruise/bleed easily.   All other systems reviewed and are negative.        OBJECTIVE:    PHYSICAL EXAMINATION:   There were no vitals filed for this visit.    Physical Exam:  Vitals reviewed.    Constitutional: He appears well-developed and  well-nourished.     Eyes: Pupils are equal, round, and reactive to light. Conjunctivae and EOM are normal.     Cardiovascular: Normal distal pulses and no edema.     Abdominal: Soft.     Skin: Skin displays no rash on trunk and no rash on extremities. Skin displays no lesions on trunk and no lesions on extremities.     Psych/Behavior: He is alert. He is oriented to person, place, and time. He has a normal mood and affect.     Musculoskeletal:        Neck: Range of motion is full.     Neurological:        DTRs: Tricep reflexes are 3+ on the right side and 3+ on the left side. Bicep reflexes are 3+ on the right side and 3+ on the left side. Brachioradialis reflexes are 3+ on the right side and 3+ on the left side. Patellar reflexes are 2+ on the right side and 2+ on the left side. Achilles reflexes are 0 on the right side and 0 on the left side.       Back Exam     Tenderness   The patient is experiencing tenderness in the sacroiliac (Right SI joint).    Range of Motion   Extension: normal   Flexion: normal   Lateral bend right: normal   Lateral bend left: normal   Rotation right: normal   Rotation left: normal     Muscle Strength   Right Quadriceps:  5/5   Left Quadriceps:  5/5   Right Hamstrings:  4/5   Left Hamstrings:  4/5     Tests   Straight leg raise right: negative  Straight leg raise left: negative    Other   Toe walk: abnormal  Heel walk: abnormal            SI joint:   Palpation at the right and left SI joints not painful  ABDILE test is negative bilaterally  Gaenslen test is negative bilaterally  Thigh thrust test is negative bilaterally    Neurologic Exam     Mental Status   Oriented to person, place, and time.   Speech: speech is normal   Level of consciousness: alert    Cranial Nerves   Cranial nerves II through XII intact.     CN III, IV, VI   Pupils are equal, round, and reactive to light.  Extraocular motions are normal.     Motor Exam   Muscle bulk: normal  Overall muscle tone: normal    Strength    Right deltoid: 5/5  Left deltoid: 5/5  Right biceps: 5/5  Left biceps: 5/5  Right triceps: 5/5  Left triceps: 5/5  Right wrist flexion: 5/5  Left wrist flexion: 5/5  Right wrist extension: 5/5  Left wrist extension: 5/5  Right interossei: 5/5  Left interossei: 5/5  Right iliopsoas: 5/5  Left iliopsoas: 5/5  Right quadriceps: 5/5  Left quadriceps: 5/5  Right hamstrin/5  Left hamstrin/5  Right anterior tibial: 2/5  Left anterior tibial: 4/5  Right gastroc: 4/5  Left gastroc: 4/5    Sensory Exam   Light touch normal.   Pinprick normal.     Gait, Coordination, and Reflexes     Gait  Gait: (Non spastic)    Reflexes   Right brachioradialis: 3+  Left brachioradialis: 3+  Right biceps: 3+  Left biceps: 3+  Right triceps: 3+  Left triceps: 3+  Right patellar: 2+  Left patellar: 2+  Right achilles: 0  Left achilles: 0  Right plantar: normal  Left plantar: normal  Right De Leon: absent  Left De Leon: present  Right ankle clonus: absent  Left ankle clonus: absent        DIAGNOSTIC DATA:  I personally interpreted the following imaging:   Cervical spine MRI  reviewed showing mild stenosis  Lumbar spine MRI  was showing no adjacent segment degeneration at L2-3  Cervical x-ray lumbar x-ray today shows good alignment, no lucency around hardware    ASSESMENT:  This is a 71 y.o. male with     Problem List Items Addressed This Visit        Neuro    Sacroiliac joint dysfunction of right side - Primary    Relevant Orders    CT Pelvis Without Contrast      Other Visit Diagnoses     Dorsalgia, unspecified        Relevant Orders    MRI Lumbar Spine W WO Contrast    CT Pelvis Without Contrast    Distal hereditary motor neuropathy                PLAN:  I do not think he has myelopathy.  The results of his recent lower extremity EMG is more suggestive of a hereditary axonal motor neuropathy  His recent difficulty standing up for long period of time is most likely the result of his hair is very axonal motor neuropathy.  He has  significant bilateral gastrocnemius atrophy.  His proximal leg strength appears good.  Continue lower extremity strengthening exercises  Will order lumbar spine MRI with and without contrast to rule out pseudoarthrosis at L5-S1  CT of the pelvis to better assess right SI joint in the context of chronic right SI joint dysfunction and to rule out any other lesion  All questions answered  \will call back once imaging is completed    More than 50% of the time was spent on discussing conservative management treatments (medication, physical therapy exercises) and possible interventions (spinal injections and surgical procedures). Care coordination was discussed.      45minutes        Jason Caldwell MD  Cell:[phone]

## 2022-04-05 ENCOUNTER — PATIENT MESSAGE (OUTPATIENT)
Dept: PSYCHIATRY | Facility: CLINIC | Age: 72
End: 2022-04-05
Payer: COMMERCIAL

## 2022-04-05 ENCOUNTER — OFFICE VISIT (OUTPATIENT)
Dept: URGENT CARE | Facility: CLINIC | Age: 72
End: 2022-04-05
Payer: COMMERCIAL

## 2022-04-05 ENCOUNTER — NURSE TRIAGE (OUTPATIENT)
Dept: ADMINISTRATIVE | Facility: CLINIC | Age: 72
End: 2022-04-05
Payer: COMMERCIAL

## 2022-04-05 ENCOUNTER — PATIENT MESSAGE (OUTPATIENT)
Dept: ORTHOPEDICS | Facility: CLINIC | Age: 72
End: 2022-04-05
Payer: COMMERCIAL

## 2022-04-05 VITALS
WEIGHT: 184 LBS | SYSTOLIC BLOOD PRESSURE: 130 MMHG | OXYGEN SATURATION: 95 % | HEART RATE: 86 BPM | HEIGHT: 68 IN | DIASTOLIC BLOOD PRESSURE: 74 MMHG | TEMPERATURE: 98 F | RESPIRATION RATE: 18 BRPM | BODY MASS INDEX: 27.89 KG/M2

## 2022-04-05 DIAGNOSIS — Z79.2 NEED FOR PROPHYLACTIC ANTIBIOTIC: ICD-10-CM

## 2022-04-05 DIAGNOSIS — S66.222A LACERATION OF EXTENSOR MUSCLE, FASCIA AND TENDON OF LEFT THUMB AT WRIST AND HAND LEVEL, INITIAL ENCOUNTER: Primary | ICD-10-CM

## 2022-04-05 PROCEDURE — 3078F PR MOST RECENT DIASTOLIC BLOOD PRESSURE < 80 MM HG: ICD-10-PCS | Mod: CPTII,S$GLB,, | Performed by: PHYSICIAN ASSISTANT

## 2022-04-05 PROCEDURE — 3078F DIAST BP <80 MM HG: CPT | Mod: CPTII,S$GLB,, | Performed by: PHYSICIAN ASSISTANT

## 2022-04-05 PROCEDURE — 1125F AMNT PAIN NOTED PAIN PRSNT: CPT | Mod: CPTII,S$GLB,, | Performed by: PHYSICIAN ASSISTANT

## 2022-04-05 PROCEDURE — 99214 PR OFFICE/OUTPT VISIT, EST, LEVL IV, 30-39 MIN: ICD-10-PCS | Mod: 25,S$GLB,, | Performed by: PHYSICIAN ASSISTANT

## 2022-04-05 PROCEDURE — 3008F BODY MASS INDEX DOCD: CPT | Mod: CPTII,S$GLB,, | Performed by: PHYSICIAN ASSISTANT

## 2022-04-05 PROCEDURE — 12041 INTMD RPR N-HF/GENIT 2.5CM/<: CPT | Mod: S$GLB,,, | Performed by: PHYSICIAN ASSISTANT

## 2022-04-05 PROCEDURE — 1157F ADVNC CARE PLAN IN RCRD: CPT | Mod: CPTII,S$GLB,, | Performed by: PHYSICIAN ASSISTANT

## 2022-04-05 PROCEDURE — 99214 OFFICE O/P EST MOD 30 MIN: CPT | Mod: 25,S$GLB,, | Performed by: PHYSICIAN ASSISTANT

## 2022-04-05 PROCEDURE — 3008F PR BODY MASS INDEX (BMI) DOCUMENTED: ICD-10-PCS | Mod: CPTII,S$GLB,, | Performed by: PHYSICIAN ASSISTANT

## 2022-04-05 PROCEDURE — 1159F MED LIST DOCD IN RCRD: CPT | Mod: CPTII,S$GLB,, | Performed by: PHYSICIAN ASSISTANT

## 2022-04-05 PROCEDURE — 1157F PR ADVANCE CARE PLAN OR EQUIV PRESENT IN MEDICAL RECORD: ICD-10-PCS | Mod: CPTII,S$GLB,, | Performed by: PHYSICIAN ASSISTANT

## 2022-04-05 PROCEDURE — 12041 LACERATION REPAIR: ICD-10-PCS | Mod: S$GLB,,, | Performed by: PHYSICIAN ASSISTANT

## 2022-04-05 PROCEDURE — 1125F PR PAIN SEVERITY QUANTIFIED, PAIN PRESENT: ICD-10-PCS | Mod: CPTII,S$GLB,, | Performed by: PHYSICIAN ASSISTANT

## 2022-04-05 PROCEDURE — 1159F PR MEDICATION LIST DOCUMENTED IN MEDICAL RECORD: ICD-10-PCS | Mod: CPTII,S$GLB,, | Performed by: PHYSICIAN ASSISTANT

## 2022-04-05 PROCEDURE — 3075F SYST BP GE 130 - 139MM HG: CPT | Mod: CPTII,S$GLB,, | Performed by: PHYSICIAN ASSISTANT

## 2022-04-05 PROCEDURE — 3075F PR MOST RECENT SYSTOLIC BLOOD PRESS GE 130-139MM HG: ICD-10-PCS | Mod: CPTII,S$GLB,, | Performed by: PHYSICIAN ASSISTANT

## 2022-04-05 RX ORDER — CEPHALEXIN 500 MG/1
500 CAPSULE ORAL EVERY 12 HOURS
Qty: 6 CAPSULE | Refills: 0 | Status: SHIPPED | OUTPATIENT
Start: 2022-04-05 | End: 2022-04-08

## 2022-04-05 RX ORDER — MUPIROCIN 20 MG/G
OINTMENT TOPICAL 2 TIMES DAILY
Qty: 22 G | Refills: 0 | Status: SHIPPED | OUTPATIENT
Start: 2022-04-05 | End: 2022-04-10

## 2022-04-05 NOTE — TELEPHONE ENCOUNTER
Patient c/o a hand laceration. Patient states that he can possibly see bones or tendons. Also reports inability to move his thumb much. Advised per protocol to go to the nearest ED now. Patient VU. Advised the patient to call back with any further questions or if symptoms worsen.      Reason for Disposition   Skin is split open or gaping (or length > 1/2 inch or 12 mm on the skin, 1/4 inch or 6 mm on the face)    Additional Information   Negative: Major bleeding (e.g., actively dripping or spurting) and can't be stopped   Negative: Amputation   Negative: Shock suspected (e.g., cold/pale/clammy skin, too weak to stand, low BP, rapid pulse)   Negative: Knife wound (or other possibly deep cut) and to chest, abdomen, back, neck, or head   Negative: Self-injury (e.g., cutting, self-harm) and suicidal or out-of-control   Negative: Sounds like a life-threatening emergency to the triager   Negative: Bleeding and won't stop after 10 minutes of direct pressure (using correct technique)    Protocols used: CUTS AND [id number]

## 2022-04-05 NOTE — PATIENT INSTRUCTIONS
PLEASE READ YOUR DISCHARGE INSTRUCTIONS ENTIRELY AS IT CONTAINS IMPORTANT INFORMATION.    Please take the antibiotics to completion. Please supplement with OTC probiotics and yogurt.     Please cover your incision for 48-72 hours.  Then keep open to air.  Use the antibiotic ointment to the wound 2-3x to open wound as directed for 1-2 weeks.     Do not submerge or go swimming for 1-2 weeks until wound is fully healed.    Please return to have your sutures removed in 14 days from today's date.     Take OTC Tylenol or anti-inflammatory as needed for pain. If no contraindication.    May apply ice to help with pain or swelling.     Please return or see your primary care doctor for signs of worsening infection (purulent drainage, fever, worsening swelling/redness/pain, inability to move your extremity).        Please arrange follow up with your primary medical clinic as soon as possible. You must understand that you've received an Urgent Care treatment only and that you may be released before all of your medical problems are known or treated. You, the patient, will arrange for follow up as instructed. If your symptoms worsen or fail to improve you should go to the Emergency Room.    WE CANNOT RULE OUT ALL POSSIBLE CAUSES OF YOUR SYMPTOMS IN THE URGENT CARE SETTING PLEASE GO TO THE ER IF YOU FEELS YOUR CONDITION IS WORSENING OR YOU WOULD LIKE EMERGENT EVALUATION.      Follow-up care  Follow up with your healthcare provider as advised. If you have stitches or staples, be sure to return as directed to have them removed.    When to seek medical advice  Call your healthcare provider right away if any of these occur:  Wound bleeding not controlled by direct pressure  Signs of infection, including increasing pain in the wound, increasing wound redness or swelling, or pus or bad odor coming from the wound  Fever of 100.4°F (38.ºC) or as directed by your health care provider  Stitches or staples come apart or fall out or surgical  tape falls off before 7 days  Wound edges re-open  Wound changes colors  Numbness or weakness in the affected hand   Decreased movement of the hand  Date Last Reviewed: 6/10/2015  © 9821-9869 [employer]. [address] [place] [address], [place] [address]. All rights reserved. This information is not intended as a substitute for professional medical care. Always follow your healthcare professional's instructions.

## 2022-04-05 NOTE — PROGRESS NOTES
"Subjective:       Patient ID: Raffy Rutherford Jr. is a 71 y.o. male.    Vitals:  height is 5' 8" (1.727 m) and weight is 83.5 kg (184 lb). His temperature is 98.4 °F (36.9 °C). His blood pressure is 130/74 and his pulse is 86. His respiration is 18 and oxygen saturation is 95%.     Chief Complaint: Laceration    71-year-old male presents to urgent care clinic for evaluation with his wife.  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  Suffered left dorsum thumb laceration to hour prior to clinic arrival.  Pt states was washing wine glass, and it broke on his left hand.  Since injury, he has and extending his left thumb.  There is associated pain and swelling and his laceration and extend into his left dorsum wrist.  Up-to-date with tetanus.  Has not taken anything for symptoms.  No other associated symptoms.  Reached out to his orthopedic hand surgeon and was told to be evaluated in urgent care today and has appointment to see orthopedics tomorrow morning.    Laceration   The incident occurred 1 to 3 hours ago. The laceration is located on the left hand. The laceration is 2 cm in size. The laceration mechanism was a broken glass. The pain is at a severity of 8/10. The pain is moderate. The pain has been constant since onset. Possible foreign bodies include glass. His tetanus status is UTD.       Constitution: Negative for activity change, chills, sweating, fatigue, fever and generalized weakness.   HENT: Negative for ear pain, hearing loss, facial swelling, congestion, postnasal drip, sinus pain, sinus pressure, sore throat, trouble swallowing and voice change.    Neck: Negative for neck pain, neck stiffness and painful lymph nodes.   Cardiovascular: Negative for chest pain, leg swelling, palpitations, sob on exertion and passing out.   Eyes: Negative for eye discharge, eye pain, eye redness, photophobia, vision loss, double vision, blurred vision and " eyelid swelling.   Respiratory: Negative for chest tightness, cough, sputum production, COPD, shortness of breath, wheezing and asthma.    Gastrointestinal: Negative for abdominal pain, nausea, vomiting, diarrhea, bright red blood in stool, dark colored stools, rectal bleeding, heartburn and bowel incontinence.   Genitourinary: Negative for dysuria, frequency, urgency, urine decreased, flank pain, bladder incontinence, hematuria and history of kidney stones.   Musculoskeletal: Positive for joint pain. Negative for trauma, joint swelling, abnormal ROM of joint, muscle cramps and muscle ache.   Skin: Positive for laceration. Negative for color change, pale, rash and wound.   Allergic/Immunologic: Negative for seasonal allergies, asthma and immunocompromised state.   Neurological: Negative for dizziness, history of vertigo, light-headedness, passing out, facial drooping, speech difficulty, coordination disturbances, loss of balance, headaches, disorientation, altered mental status, loss of consciousness, numbness, tingling and seizures.   Hematologic/Lymphatic: Negative for swollen lymph nodes, easy bruising/bleeding and trouble clotting. Does not bruise/bleed easily.   Psychiatric/Behavioral: Negative for altered mental status and disorientation.       Objective:      Physical Exam   Constitutional: He appears well-developed. He is cooperative.  Non-toxic appearance. He does not appear ill. No distress.   HENT:   Head: Normocephalic and atraumatic.   Ears:   Right Ear: Hearing, external ear and ear canal normal.   Left Ear: Hearing, external ear and ear canal normal.   Nose: Nose normal.   Eyes: Conjunctivae and EOM are normal. Pupils are equal, round, and reactive to light. Right eye exhibits no discharge. Left eye exhibits no discharge. Right eye exhibits normal extraocular motion. Left eye exhibits normal extraocular motion.      extraocular movement intact vision grossly intact gaze aligned appropriately   Neck:  Phonation normal. Neck supple.   Cardiovascular: Normal rate and regular rhythm.   Pulmonary/Chest: Effort normal. No accessory muscle usage. No respiratory distress. He has no wheezes.   Abdominal: Normal appearance. He exhibits no distension.   Musculoskeletal:         General: Swelling, tenderness and signs of injury present.      Right lower leg: No edema.      Left lower leg: No edema.      Comments: Moves all extremities with normal tone, strength, and ROM.    Gait normal.    Left anterior dorsal wrist tenderness palpation.  Left thumb extensor weakness.  Full strength with thumb flexion and opposition.   Neurological: no focal deficit. He is alert and at baseline. He has normal motor skills. He displays no weakness, facial symmetry, normal reflexes and no dysarthria. No cranial nerve deficit or sensory deficit. He exhibits normal muscle tone. Gait and coordination normal. Coordination normal.   Skin: Skin is warm, dry, intact, not diaphoretic and no rash. Capillary refill takes less than 2 seconds.         Comments: Left thumb 2 cm deep laceration with extensor tendon involvement.   Psychiatric: His speech is normal and behavior is normal. Mood, affect and thought content normal.   Nursing note and vitals reviewed.              Assessment:       1. Laceration of extensor muscle, fascia and tendon of left thumb at wrist and hand level, initial encounter    2. Need for prophylactic antibiotic        Patient presents with complex deep left extensor thumb laceration with extensor involvement.  Patient is up-to-date with tetanus.  It was cleaned and closed with 7 simple 5-0 nylon sutures.  Patient tolerated well.  No focal weakness/paresthesias on exam.  We treated his wrist with an Ace wrap.  Patient was given topical Bactroban, oral prophylactic antibiotic, and wound care instructions verbally/printed. patient was recommended activity modification, rice therapy OTC pain relievers p.r.n.. Patient will return to  clinic in14 days for suture removal.     Patient has appointment to see orthopedic hand surgeon, Dr. Solis tomorrow 1st thing in the morning.    Strict clinic vs. ER precautions given.  Patient verbalized understanding and agreed with plan of care.      Note dictated with voice recognition software, please excuse any grammatical errors.    Plan:         Laceration of extensor muscle, fascia and tendon of left thumb at wrist and hand level, initial encounter  -     cephALEXin (KEFLEX) 500 MG capsule; Take 1 capsule (500 mg total) by mouth every 12 (twelve) hours. for 3 days  Dispense: 6 capsule; Refill: 0  -     mupirocin (BACTROBAN) 2 % ointment; Apply topically 2 (two) times daily. for 5 days  Dispense: 22 g; Refill: 0  -     Laceration Repair    Need for prophylactic antibiotic  -     cephALEXin (KEFLEX) 500 MG capsule; Take 1 capsule (500 mg total) by mouth every 12 (twelve) hours. for 3 days  Dispense: 6 capsule; Refill: 0              Additional MDM:     Heart Failure Score:   COPD = No       Patient Instructions   PLEASE READ YOUR DISCHARGE INSTRUCTIONS ENTIRELY AS IT CONTAINS IMPORTANT INFORMATION.    Please take the antibiotics to completion. Please supplement with OTC probiotics and yogurt.     Please cover your incision for 48-72 hours.  Then keep open to air.  Use the antibiotic ointment to the wound 2-3x to open wound as directed for 1-2 weeks.     Do not submerge or go swimming for 1-2 weeks until wound is fully healed.    Please return to have your sutures removed in 14 days from today's date.     Take OTC Tylenol or anti-inflammatory as needed for pain. If no contraindication.    May apply ice to help with pain or swelling.     Please return or see your primary care doctor for signs of worsening infection (purulent drainage, fever, worsening swelling/redness/pain, inability to move your extremity).        Please arrange follow up with your primary medical clinic as soon as possible. You must  understand that you've received an Urgent Care treatment only and that you may be released before all of your medical problems are known or treated. You, the patient, will arrange for follow up as instructed. If your symptoms worsen or fail to improve you should go to the Emergency Room.    WE CANNOT RULE OUT ALL POSSIBLE CAUSES OF YOUR SYMPTOMS IN THE URGENT CARE SETTING PLEASE GO TO THE ER IF YOU FEELS YOUR CONDITION IS WORSENING OR YOU WOULD LIKE EMERGENT EVALUATION.      Follow-up care  Follow up with your healthcare provider as advised. If you have stitches or staples, be sure to return as directed to have them removed.    When to seek medical advice  Call your healthcare provider right away if any of these occur:  · Wound bleeding not controlled by direct pressure  · Signs of infection, including increasing pain in the wound, increasing wound redness or swelling, or pus or bad odor coming from the wound  · Fever of 100.4°F (38.ºC) or as directed by your health care provider  · Stitches or staples come apart or fall out or surgical tape falls off before 7 days  · Wound edges re-open  · Wound changes colors  · Numbness or weakness in the affected hand   · Decreased movement of the hand  Date Last Reviewed: 6/10/2015  © 2786-3170 [employer]. [address] [place] [address], [place] [address]. All rights reserved. This information is not intended as a substitute for professional medical care. Always follow your healthcare professional's instructions.

## 2022-04-05 NOTE — PROCEDURES
Laceration Repair    Date/Time: 4/5/2022 5:15 PM  Performed by: Juliocesar Parker PA-C  Authorized by: Juliocesar Parker PA-C   Consent Done: Yes  Consent: Verbal consent obtained.  Risks and benefits: risks, benefits and alternatives were discussed  Consent given by: patient  Patient understanding: patient states understanding of the procedure being performed  Body area: upper extremity  Location details: left thumb  Laceration length: 2.5 cm  Foreign bodies: glass  Tendon involvement: complex  Nerve involvement: none  Vascular damage: no  Anesthesia: local infiltration    Anesthesia:  Local Anesthetic: lidocaine 1% with epinephrine  Anesthetic total: 2 mL    Patient sedated: no  Preparation: Patient was prepped and draped in the usual sterile fashion.  Irrigation solution: saline  Irrigation method: syringe  Amount of cleaning: extensive (hibiclens and betadine)  Debridement: none  Degree of undermining: none  Skin closure: 5-0 nylon  Number of sutures: 7  Technique: complex and simple  Approximation: close  Approximation difficulty: complex  Dressing: antibiotic ointment, non-stick sterile dressing and pressure/compression dressing  Patient tolerance: Patient tolerated the procedure well with no immediate complications

## 2022-04-06 ENCOUNTER — TELEPHONE (OUTPATIENT)
Dept: ORTHOPEDICS | Facility: CLINIC | Age: 72
End: 2022-04-06
Payer: COMMERCIAL

## 2022-04-06 ENCOUNTER — OFFICE VISIT (OUTPATIENT)
Dept: ORTHOPEDICS | Facility: CLINIC | Age: 72
End: 2022-04-06
Payer: COMMERCIAL

## 2022-04-06 ENCOUNTER — ANESTHESIA EVENT (OUTPATIENT)
Dept: SURGERY | Facility: HOSPITAL | Age: 72
End: 2022-04-06
Payer: COMMERCIAL

## 2022-04-06 VITALS — HEIGHT: 68 IN | BODY MASS INDEX: 26.52 KG/M2 | WEIGHT: 175 LBS

## 2022-04-06 DIAGNOSIS — S66.229A LACERATION OF EXTENSOR MUSCLE AND TENDON OF THUMB AT WRIST AND HAND LEVEL: Primary | ICD-10-CM

## 2022-04-06 DIAGNOSIS — S56.429A EXTENSOR TENDON LACERATION OF FINGER WITH OPEN WOUND, INITIAL ENCOUNTER: ICD-10-CM

## 2022-04-06 DIAGNOSIS — S61.209A EXTENSOR TENDON LACERATION OF FINGER WITH OPEN WOUND, INITIAL ENCOUNTER: ICD-10-CM

## 2022-04-06 PROCEDURE — 1157F PR ADVANCE CARE PLAN OR EQUIV PRESENT IN MEDICAL RECORD: ICD-10-PCS | Mod: CPTII,S$GLB,, | Performed by: ORTHOPAEDIC SURGERY

## 2022-04-06 PROCEDURE — 1159F PR MEDICATION LIST DOCUMENTED IN MEDICAL RECORD: ICD-10-PCS | Mod: CPTII,S$GLB,, | Performed by: ORTHOPAEDIC SURGERY

## 2022-04-06 PROCEDURE — 99214 OFFICE O/P EST MOD 30 MIN: CPT | Mod: 57,S$GLB,, | Performed by: ORTHOPAEDIC SURGERY

## 2022-04-06 PROCEDURE — 3008F BODY MASS INDEX DOCD: CPT | Mod: CPTII,S$GLB,, | Performed by: ORTHOPAEDIC SURGERY

## 2022-04-06 PROCEDURE — 1160F RVW MEDS BY RX/DR IN RCRD: CPT | Mod: CPTII,S$GLB,, | Performed by: ORTHOPAEDIC SURGERY

## 2022-04-06 PROCEDURE — 3008F PR BODY MASS INDEX (BMI) DOCUMENTED: ICD-10-PCS | Mod: CPTII,S$GLB,, | Performed by: ORTHOPAEDIC SURGERY

## 2022-04-06 PROCEDURE — 1125F AMNT PAIN NOTED PAIN PRSNT: CPT | Mod: CPTII,S$GLB,, | Performed by: ORTHOPAEDIC SURGERY

## 2022-04-06 PROCEDURE — 99214 PR OFFICE/OUTPT VISIT, EST, LEVL IV, 30-39 MIN: ICD-10-PCS | Mod: 57,S$GLB,, | Performed by: ORTHOPAEDIC SURGERY

## 2022-04-06 PROCEDURE — 99999 PR PBB SHADOW E&M-EST. PATIENT-LVL IV: ICD-10-PCS | Mod: PBBFAC,,, | Performed by: ORTHOPAEDIC SURGERY

## 2022-04-06 PROCEDURE — 1160F PR REVIEW ALL MEDS BY PRESCRIBER/CLIN PHARMACIST DOCUMENTED: ICD-10-PCS | Mod: CPTII,S$GLB,, | Performed by: ORTHOPAEDIC SURGERY

## 2022-04-06 PROCEDURE — 99999 PR PBB SHADOW E&M-EST. PATIENT-LVL IV: CPT | Mod: PBBFAC,,, | Performed by: ORTHOPAEDIC SURGERY

## 2022-04-06 PROCEDURE — 1157F ADVNC CARE PLAN IN RCRD: CPT | Mod: CPTII,S$GLB,, | Performed by: ORTHOPAEDIC SURGERY

## 2022-04-06 PROCEDURE — 1159F MED LIST DOCD IN RCRD: CPT | Mod: CPTII,S$GLB,, | Performed by: ORTHOPAEDIC SURGERY

## 2022-04-06 PROCEDURE — 1125F PR PAIN SEVERITY QUANTIFIED, PAIN PRESENT: ICD-10-PCS | Mod: CPTII,S$GLB,, | Performed by: ORTHOPAEDIC SURGERY

## 2022-04-06 NOTE — TELEPHONE ENCOUNTER
Left patient a message about a question he asked prior to leaving clinic on today. Patient was told that he could take 1 caffeine tablet in the morning and is preferred to have done so by 0500

## 2022-04-06 NOTE — PROGRESS NOTES
[facility]  History & Physical  Orthopedics    SUBJECTIVE:          Chief Complaint: Laceration of the left thumb    History of Present Illness:  Patient is a 71 y.o. right hand dominant male who presents today with complaints of suffered left thumb laceration last night while doing the dishes. He was cleaning his wine glass and it shattered. Reports that there was a brief episode of bleeding, however it ceased with pressure. He went to urgent care last night where it was irrigated and the skin was closed. He has a picture of the wound from where the injury occurred which appears to show laceration of a tendon deep to the skin laceration.   He reports weakness in extending the thumb at the distal joint.    Denies tingling or numbness.     The patient denies any fevers, chills, N/V, D/C and presents for evaluation.       Past Medical History:   Diagnosis Date    Allergy     Arthritis     Back pain     after trauma beginning in 195    Cataract     Chronic pain     neck and left shoulder    Cluster headache 2013    Colon polyps 2007 2007-2019: TA x5, HP x3    Degenerative disc disease     Depression     Diverticulitis 12/2013    Fibromyalgia 2013    GERD (gastroesophageal reflux disease)     Hepatitis 1970's    A    History of prostate biopsy 2002    Hyperlipidemia     Joint pain     Prostate cancer 07/2021    Sleep apnea     Thyroid nodule 7/16/2014    Tubular adenoma of colon 2007    5 removed [id number]    Visual disturbance 2012    problems after cataract surgery     Past Surgical History:   Procedure Laterality Date    BACK SURGERY      CARPAL TUNNEL RELEASE Right 5/18/2021    Procedure: RELEASE, CARPAL TUNNEL;  Surgeon: Kaye Solis MD;  Location: [facility];  Service: Orthopedics;  Laterality: Right;    CATARACT EXTRACTION W/  INTRAOCULAR LENS IMPLANT Bilateral     CHOLECYSTECTOMY      COLONOSCOPY N/A 12/17/2019    Normal - Repeat 5yrs    COLONOSCOPY  2007     2007 TA x2, 2011 TA x3, 2014 HP x3, 2019 normal    COSMETIC SURGERY  2/10/2015    Direct mid-forehead brow lift    COSMETIC SURGERY  2/10/2015    Bilateral upper lid blepahroplasty    CYSTOSCOPY WITH URETEROSCOPY, RETROGRADE PYELOGRAPHY, AND INSERTION OF STENT Left 8/19/2020    Procedure: CYSTOSCOPY, WITH RETROGRADE PYELOGRAM AND URETERAL STENT INSERTION;  Surgeon: Katelynn George MD;  Location: [facility] OR;  Service: Urology;  Laterality: Left;    ESOPHAGOGASTRODUODENOSCOPY N/A 12/17/2019    Procedure: ESOPHAGOGASTRODUODENOSCOPY (EGD);  Surgeon: Amadou Hardin MD;  Location: [facility] ([address] [place] [address]);  Service: Endoscopy;  Laterality: N/A;    EYE SURGERY      FUSION OF LUMBAR SPINE BY ANTERIOR APPROACH N/A 8/20/2020    Procedure: FUSION, SPINE, LUMBAR, ANTERIOR APPROACH L5-S1 ALIF Stand Alone;  Surgeon: Jason Caldwell MD;  Location: [facility] OR;  Service: Neurosurgery;  Laterality: N/A;    HEMORRHOID SURGERY      with complication of chronic bleeding for 6 weeks     INJECTION OF STEROID Right 12/6/2018    Procedure: INJECTION, STEROID Right SI Joint Block and Steroid Injection;  Surgeon: Jason Caldwell MD;  Location: [facility] OR;  Service: Neurosurgery;  Laterality: Right;  Procedure: Right SI Joint Block and Steroid Injection  Surgery Time: 30 MIN  LOS: 0  Anesthesia: MAC  Radiology: C-arm  Bed: [place] 4 Poster  Position: Prone    INJECTION OF STEROID Right 9/19/2019    Procedure: INJECTION, STEROID Procedure: Right SI joint block nd steroid injection;  Surgeon: Jason Caldwell MD;  Location: [facility];  Service: Neurosurgery;  Laterality: Right;  Procedure: Right SI joint block nd steroid injection  Surgery Time: 30 mins  LOS:   Anesthesia: General MAC  Radiology:C-arm  Bed: Regular Bed  Position: Prone    JOINT REPLACEMENT      KNEE SURGERY      involving arthroscopic surgery to both knees    SINUS SURGERY      left molar and sinus surgery for trigeminal neuralgia    SPINAL FUSION  06/22/2015    L3-L5  OMEGA/TANA    TOTAL HIP ARTHROPLASTY  4/2012    Pt states he had total hip replacement on his left hip.    ULNAR NERVE TRANSPOSITION Left 12/16/2020    Procedure: TRANSPOSITION, NERVE, ULNAR - left carpal and cubital tunnel releases;  Surgeon: Addi Bauer MD;  Location: [facility];  Service: Orthopedics;  Laterality: Left;     Review of patient's allergies indicates:   Allergen Reactions    Alphagan [brimonidine]      Patient taking MASO-B Selective Inhibitor Selegiline (Emsam)    Coumadin [warfarin]      itch    Oxycodone      hiccups     Social History     Social History Narrative    Not on file     Family History   Problem Relation Age of Onset    Stroke Mother 86    Colon cancer Mother         early/mid-60s    Uterine cancer Mother 77    Pancreatitis Brother         acute, now s/p nathalia    Diabetes Brother     Macular degeneration Brother     Other Brother         foot drop    Other Father 44        pituitary tumor- CA vs benign?    Heart attack Maternal Grandfather         suspected, 50s    Migraines Sister     Crohn's disease Sister         w/ assoc joint issues    Arthritis Sister     Tremor Daughter     Irritable bowel syndrome Son         leaning toward Crohn's dx    Neurological Disorders Son         nonspecific, benign fasciculations of calves    Tremor Son     Jaundice Grandchild     Other Maternal Uncle         memory issue    Other Maternal Uncle         memory issue    Cancer Maternal Cousin         origin? maybe thyroid? 40s?    Melanoma Neg Hx     Amblyopia Neg Hx     Blindness Neg Hx     Cataracts Neg Hx     Glaucoma Neg Hx     Hypertension Neg Hx     Retinal detachment Neg Hx     Strabismus Neg Hx     Thyroid disease Neg Hx     Allergic rhinitis Neg Hx     Allergies Neg Hx     Angioedema Neg Hx     Asthma Neg Hx     Eczema Neg Hx     Urticaria Neg Hx     Rhinitis Neg Hx     Immunodeficiency Neg Hx     Atopy Neg Hx          Current Outpatient  Medications:     cephALEXin (KEFLEX) 500 MG capsule, Take 1 capsule (500 mg total) by mouth every 12 (twelve) hours. for 3 days, Disp: 6 capsule, Rfl: 0    clindamycin (CLEOCIN) 150 MG capsule, Take 150 mg by mouth 4 (four) times daily., Disp: , Rfl:     clonazePAM (KLONOPIN) 0.5 MG tablet, Take 1 tablet by mouth twice a day as needed for anxiety, Disp: 60 tablet, Rfl: 5    clonazePAM (KLONOPIN) 0.5 MG tablet, Take 1 tablet by mouth twice a day as needed for anxiety, Disp: 60 tablet, Rfl: 5    DULoxetine (CYMBALTA) 20 MG capsule, TAKE 2 CAPSULES BY MOUTH DAILY, Disp: 60 capsule, Rfl: 5    HYDROcodone-acetaminophen (NORCO) 5-325 mg per tablet, Take 1 tablet by mouth three times daily as needed for pain., Disp: 90 tablet, Rfl: 0    lamoTRIgine (LAMICTAL) 100 MG tablet, Take 2 tablets by mouth once a day, Disp: 180 tablet, Rfl: 3    lamoTRIgine (LAMICTAL) 200 MG tablet, Take 1 tablet (200 mg total) by mouth once daily., Disp: 90 tablet, Rfl: 3    mupirocin (BACTROBAN) 2 % ointment, Apply topically 2 (two) times daily. for 5 days, Disp: 22 g, Rfl: 0    pilocarpine HCl (VUITY) 1.25 % Drop, Apply 1 drop to each eye once daily., Disp: 5 mL, Rfl: 6    pravastatin (PRAVACHOL) 40 MG tablet, Take 1 tablet (40 mg total) by mouth once daily., Disp: 90 tablet, Rfl: 3    pregabalin (LYRICA) 25 MG capsule, Take 1 capsule by mouth twice daily during the day and 1 capsule at bedtime, Disp: 90 capsule, Rfl: 2    pregabalin (LYRICA) 75 MG capsule, Take 1 capsule(s) twice a day by oral route in the evening for 30 days., Disp: 60 capsule, Rfl: 3    pregabalin (LYRICA) 75 MG capsule, Take 3 capsules by mouth at night, Disp: 90 capsule, Rfl: 2    RABEprazole (ACIPHEX) 20 mg tablet, Take 1 tablet (20 mg total) by mouth 2 (two) times daily., Disp: 180 tablet, Rfl: 3    rimegepant (NURTEC) 75 mg odt, Take 1 tablet (75 mg total) by mouth every other day. Place ODT tablet on the tongue; alternatively the ODT tablet may be placed  "under the tongue, Disp: 16 tablet, Rfl: 2    rimegepant (NURTEC) 75 mg odt, Take 1 tablet every day by mouth as directed for 30 days., Disp: 8 tablet, Rfl: 3    rimegepant (NURTEC) 75 mg odt, Take 1 tablet every day by oral route as directed for 30 days., Disp: 8 tablet, Rfl: 3    tadalafiL (CIALIS) 5 MG tablet, Take 1 tablet (5 mg total) by mouth daily as needed for Erectile Dysfunction., Disp: 120 tablet, Rfl: 3    traZODone (DESYREL) 100 MG tablet, Take 1 tablet by mouth every night at bedtime, Disp: 90 tablet, Rfl: 3    varenicline (TYRVAYA) 0.03 mg/spray sprm, 1 spray by Nasal route 2 (two) times a day., Disp: 4.2 mL, Rfl: 6  No current facility-administered medications for this visit.    Facility-Administered Medications Ordered in Other Visits:     fentaNYL injection 25 mcg, 25 mcg, Intravenous, Q5 Min PRN, Cici Lieberman MD, 50 mcg at 12/16/20 0745    midazolam (VERSED) 1 mg/mL injection 0.5 mg, 0.5 mg, Intravenous, PRN, Cici Lieberman MD, 1 mg at 12/16/20 0749    ROS    Review of Systems:  Constitutional: no fever or chills  Eyes: no visual changes  ENT: no nasal congestion or sore throat  Respiratory: no cough or shortness of breath  Cardiovascular: no chest pain  Gastrointestinal: no nausea or vomiting, tolerating diet      OBJECTIVE:      Vital Signs (Most Recent):  Vitals:    04/06/22 0829   Weight: 79.4 kg (175 lb)   Height: 5' 8" (1.727 m)     Body mass index is 26.61 kg/m².    Physical Exam    Physical Exam:  Constitutional: The patient appears well-developed and well-nourished. No distress. He is moderately distressed by this injury.  Head: Normocephalic and atraumatic.   Nose: Nose normal.   Eyes: Conjunctivae and EOM are normal.   Neck: No tracheal deviation present.   Cardiovascular: Normal rate and intact distal pulses.    Pulmonary/Chest: Effort normal. No respiratory distress.   Neurological: The patient is alert.   Psychiatric: The patient has a normal mood and affect. "     Bilateral Hand/Wrist Examination:    Observation/Inspection:  Swelling  none    Deformity  none  Discoloration  none     Scars   Repaired 2 cm laceration of the left thumb dorsal aspect    Atrophy  none    HAND/WRIST EXAMINATION:  Finkelstein's Test   Neg  WHAT Test    Neg  Snuff box tenderness   Neg  Ochoa's Test    Neg  Hook of Hamate Tenderness  Neg  CMC grind    Neg  Circumduction test   Neg  TFCC Compression Test  Neg    ROM of left thumb limited. No extension of the IP joint of the thumb. MP joint extension and opposition intact.  ROM of right upper ext WNL    Neurovascular Exam:  Digits WWP, brisk CR < 3s throughout  NVI motor/LTS to M/R/U nerves, radial pulse 2+  2+ biceps and brachioradialis reflexes  Tinel's Test - Carpal Tunnel  Neg  Tinel's Test - Cubital Tunnel  Neg  Phalen's Test    Neg  Median Nerve Compression Test Neg      ASSESSMENT/PLAN:      71 y.o. yo male with laceration of the extensor pollicis longus of the left thumb    Plan:    We have discussed conservative vs. surgical treatment as well as risks, benefits and alternatives for left thumb wound with EPL laceration.  Conservative measures have been exhausted and he would like to proceed with surgery. Surgery would include Left thumb wound exploration, I&D and EPL tendon repair. Light use of the hand will be indicated for the first 4-6 weeks. His wound was dressed and he was put in a thumb spica splint today.     We have discussed risks of hand surgery which include but are not limited to blood clots in the legs that can travel to the lungs (pulmonary embolism). Pulmonary embolism can cause shortness of breath, chest pain, and even shock. Other risks include urinary tract infection, nausea and vomiting (usually related to pain medication), chronic pain, bleeding, nerve damage, blood vessel injury, scarring and infection of the hand which can require re-operation. Furthermore, the risks of anesthesia include potential heart, lung, kidney,  and liver damage.  Informed consent was obtained.  The patient understands and would like to proceed with surgery in the near future.      The patient's pathophysiology was explained in detail with reference to x-rays, models, other visual aids as appropriate.  Treatment options were discussed in detail.  Questions were invited and answered to the patient's satisfaction. I reviewed Primary care , and other specialty's notes to better coordinate patient's care.          Kaye Solis MD     Please be aware that this note has been generated with the assistance of [employer] voice-to-text.  Please excuse any spelling or grammatical errors.

## 2022-04-06 NOTE — H&P (VIEW-ONLY)
[facility]  History & Physical  Orthopedics    SUBJECTIVE:          Chief Complaint: Laceration of the left thumb    History of Present Illness:  Patient is a 71 y.o. right hand dominant male who presents today with complaints of suffered left thumb laceration last night while doing the dishes. He was cleaning his wine glass and it shattered. Reports that there was a brief episode of bleeding, however it ceased with pressure. He went to urgent care last night where it was irrigated and the skin was closed. He has a picture of the wound from where the injury occurred which appears to show laceration of a tendon deep to the skin laceration.   He reports weakness in extending the thumb at the distal joint.    Denies tingling or numbness.     The patient denies any fevers, chills, N/V, D/C and presents for evaluation.       Past Medical History:   Diagnosis Date    Allergy     Arthritis     Back pain     after trauma beginning in 195    Cataract     Chronic pain     neck and left shoulder    Cluster headache 2013    Colon polyps 2007 2007-2019: TA x5, HP x3    Degenerative disc disease     Depression     Diverticulitis 12/2013    Fibromyalgia 2013    GERD (gastroesophageal reflux disease)     Hepatitis 1970's    A    History of prostate biopsy 2002    Hyperlipidemia     Joint pain     Prostate cancer 07/2021    Sleep apnea     Thyroid nodule 7/16/2014    Tubular adenoma of colon 2007    5 removed [id number]    Visual disturbance 2012    problems after cataract surgery     Past Surgical History:   Procedure Laterality Date    BACK SURGERY      CARPAL TUNNEL RELEASE Right 5/18/2021    Procedure: RELEASE, CARPAL TUNNEL;  Surgeon: Kaye Solis MD;  Location: [facility];  Service: Orthopedics;  Laterality: Right;    CATARACT EXTRACTION W/  INTRAOCULAR LENS IMPLANT Bilateral     CHOLECYSTECTOMY      COLONOSCOPY N/A 12/17/2019    Normal - Repeat 5yrs    COLONOSCOPY  2007     2007 TA x2, 2011 TA x3, 2014 HP x3, 2019 normal    COSMETIC SURGERY  2/10/2015    Direct mid-forehead brow lift    COSMETIC SURGERY  2/10/2015    Bilateral upper lid blepahroplasty    CYSTOSCOPY WITH URETEROSCOPY, RETROGRADE PYELOGRAPHY, AND INSERTION OF STENT Left 8/19/2020    Procedure: CYSTOSCOPY, WITH RETROGRADE PYELOGRAM AND URETERAL STENT INSERTION;  Surgeon: Katelynn George MD;  Location: [facility] OR;  Service: Urology;  Laterality: Left;    ESOPHAGOGASTRODUODENOSCOPY N/A 12/17/2019    Procedure: ESOPHAGOGASTRODUODENOSCOPY (EGD);  Surgeon: Amadou Hardin MD;  Location: [facility] ([address] [place] [address]);  Service: Endoscopy;  Laterality: N/A;    EYE SURGERY      FUSION OF LUMBAR SPINE BY ANTERIOR APPROACH N/A 8/20/2020    Procedure: FUSION, SPINE, LUMBAR, ANTERIOR APPROACH L5-S1 ALIF Stand Alone;  Surgeon: Jason Caldwell MD;  Location: [facility] OR;  Service: Neurosurgery;  Laterality: N/A;    HEMORRHOID SURGERY      with complication of chronic bleeding for 6 weeks     INJECTION OF STEROID Right 12/6/2018    Procedure: INJECTION, STEROID Right SI Joint Block and Steroid Injection;  Surgeon: Jason Caldwell MD;  Location: [facility] OR;  Service: Neurosurgery;  Laterality: Right;  Procedure: Right SI Joint Block and Steroid Injection  Surgery Time: 30 MIN  LOS: 0  Anesthesia: MAC  Radiology: C-arm  Bed: [place] 4 Poster  Position: Prone    INJECTION OF STEROID Right 9/19/2019    Procedure: INJECTION, STEROID Procedure: Right SI joint block nd steroid injection;  Surgeon: Jason Caldwell MD;  Location: [facility];  Service: Neurosurgery;  Laterality: Right;  Procedure: Right SI joint block nd steroid injection  Surgery Time: 30 mins  LOS:   Anesthesia: General MAC  Radiology:C-arm  Bed: Regular Bed  Position: Prone    JOINT REPLACEMENT      KNEE SURGERY      involving arthroscopic surgery to both knees    SINUS SURGERY      left molar and sinus surgery for trigeminal neuralgia    SPINAL FUSION  06/22/2015    L3-L5  OMEGA/TANA    TOTAL HIP ARTHROPLASTY  4/2012    Pt states he had total hip replacement on his left hip.    ULNAR NERVE TRANSPOSITION Left 12/16/2020    Procedure: TRANSPOSITION, NERVE, ULNAR - left carpal and cubital tunnel releases;  Surgeon: Addi Bauer MD;  Location: [facility];  Service: Orthopedics;  Laterality: Left;     Review of patient's allergies indicates:   Allergen Reactions    Alphagan [brimonidine]      Patient taking MASO-B Selective Inhibitor Selegiline (Emsam)    Coumadin [warfarin]      itch    Oxycodone      hiccups     Social History     Social History Narrative    Not on file     Family History   Problem Relation Age of Onset    Stroke Mother 86    Colon cancer Mother         early/mid-60s    Uterine cancer Mother 77    Pancreatitis Brother         acute, now s/p nathalia    Diabetes Brother     Macular degeneration Brother     Other Brother         foot drop    Other Father 44        pituitary tumor- CA vs benign?    Heart attack Maternal Grandfather         suspected, 50s    Migraines Sister     Crohn's disease Sister         w/ assoc joint issues    Arthritis Sister     Tremor Daughter     Irritable bowel syndrome Son         leaning toward Crohn's dx    Neurological Disorders Son         nonspecific, benign fasciculations of calves    Tremor Son     Jaundice Grandchild     Other Maternal Uncle         memory issue    Other Maternal Uncle         memory issue    Cancer Maternal Cousin         origin? maybe thyroid? 40s?    Melanoma Neg Hx     Amblyopia Neg Hx     Blindness Neg Hx     Cataracts Neg Hx     Glaucoma Neg Hx     Hypertension Neg Hx     Retinal detachment Neg Hx     Strabismus Neg Hx     Thyroid disease Neg Hx     Allergic rhinitis Neg Hx     Allergies Neg Hx     Angioedema Neg Hx     Asthma Neg Hx     Eczema Neg Hx     Urticaria Neg Hx     Rhinitis Neg Hx     Immunodeficiency Neg Hx     Atopy Neg Hx          Current Outpatient  Medications:     cephALEXin (KEFLEX) 500 MG capsule, Take 1 capsule (500 mg total) by mouth every 12 (twelve) hours. for 3 days, Disp: 6 capsule, Rfl: 0    clindamycin (CLEOCIN) 150 MG capsule, Take 150 mg by mouth 4 (four) times daily., Disp: , Rfl:     clonazePAM (KLONOPIN) 0.5 MG tablet, Take 1 tablet by mouth twice a day as needed for anxiety, Disp: 60 tablet, Rfl: 5    clonazePAM (KLONOPIN) 0.5 MG tablet, Take 1 tablet by mouth twice a day as needed for anxiety, Disp: 60 tablet, Rfl: 5    DULoxetine (CYMBALTA) 20 MG capsule, TAKE 2 CAPSULES BY MOUTH DAILY, Disp: 60 capsule, Rfl: 5    HYDROcodone-acetaminophen (NORCO) 5-325 mg per tablet, Take 1 tablet by mouth three times daily as needed for pain., Disp: 90 tablet, Rfl: 0    lamoTRIgine (LAMICTAL) 100 MG tablet, Take 2 tablets by mouth once a day, Disp: 180 tablet, Rfl: 3    lamoTRIgine (LAMICTAL) 200 MG tablet, Take 1 tablet (200 mg total) by mouth once daily., Disp: 90 tablet, Rfl: 3    mupirocin (BACTROBAN) 2 % ointment, Apply topically 2 (two) times daily. for 5 days, Disp: 22 g, Rfl: 0    pilocarpine HCl (VUITY) 1.25 % Drop, Apply 1 drop to each eye once daily., Disp: 5 mL, Rfl: 6    pravastatin (PRAVACHOL) 40 MG tablet, Take 1 tablet (40 mg total) by mouth once daily., Disp: 90 tablet, Rfl: 3    pregabalin (LYRICA) 25 MG capsule, Take 1 capsule by mouth twice daily during the day and 1 capsule at bedtime, Disp: 90 capsule, Rfl: 2    pregabalin (LYRICA) 75 MG capsule, Take 1 capsule(s) twice a day by oral route in the evening for 30 days., Disp: 60 capsule, Rfl: 3    pregabalin (LYRICA) 75 MG capsule, Take 3 capsules by mouth at night, Disp: 90 capsule, Rfl: 2    RABEprazole (ACIPHEX) 20 mg tablet, Take 1 tablet (20 mg total) by mouth 2 (two) times daily., Disp: 180 tablet, Rfl: 3    rimegepant (NURTEC) 75 mg odt, Take 1 tablet (75 mg total) by mouth every other day. Place ODT tablet on the tongue; alternatively the ODT tablet may be placed  "under the tongue, Disp: 16 tablet, Rfl: 2    rimegepant (NURTEC) 75 mg odt, Take 1 tablet every day by mouth as directed for 30 days., Disp: 8 tablet, Rfl: 3    rimegepant (NURTEC) 75 mg odt, Take 1 tablet every day by oral route as directed for 30 days., Disp: 8 tablet, Rfl: 3    tadalafiL (CIALIS) 5 MG tablet, Take 1 tablet (5 mg total) by mouth daily as needed for Erectile Dysfunction., Disp: 120 tablet, Rfl: 3    traZODone (DESYREL) 100 MG tablet, Take 1 tablet by mouth every night at bedtime, Disp: 90 tablet, Rfl: 3    varenicline (TYRVAYA) 0.03 mg/spray sprm, 1 spray by Nasal route 2 (two) times a day., Disp: 4.2 mL, Rfl: 6  No current facility-administered medications for this visit.    Facility-Administered Medications Ordered in Other Visits:     fentaNYL injection 25 mcg, 25 mcg, Intravenous, Q5 Min PRN, Cici Lieberman MD, 50 mcg at 12/16/20 0745    midazolam (VERSED) 1 mg/mL injection 0.5 mg, 0.5 mg, Intravenous, PRN, Cici Lieberman MD, 1 mg at 12/16/20 0749    ROS    Review of Systems:  Constitutional: no fever or chills  Eyes: no visual changes  ENT: no nasal congestion or sore throat  Respiratory: no cough or shortness of breath  Cardiovascular: no chest pain  Gastrointestinal: no nausea or vomiting, tolerating diet      OBJECTIVE:      Vital Signs (Most Recent):  Vitals:    04/06/22 0829   Weight: 79.4 kg (175 lb)   Height: 5' 8" (1.727 m)     Body mass index is 26.61 kg/m².    Physical Exam    Physical Exam:  Constitutional: The patient appears well-developed and well-nourished. No distress. He is moderately distressed by this injury.  Head: Normocephalic and atraumatic.   Nose: Nose normal.   Eyes: Conjunctivae and EOM are normal.   Neck: No tracheal deviation present.   Cardiovascular: Normal rate and intact distal pulses.    Pulmonary/Chest: Effort normal. No respiratory distress.   Neurological: The patient is alert.   Psychiatric: The patient has a normal mood and affect. "     Bilateral Hand/Wrist Examination:    Observation/Inspection:  Swelling  none    Deformity  none  Discoloration  none     Scars   Repaired 2 cm laceration of the left thumb dorsal aspect    Atrophy  none    HAND/WRIST EXAMINATION:  Finkelstein's Test   Neg  WHAT Test    Neg  Snuff box tenderness   Neg  Ochoa's Test    Neg  Hook of Hamate Tenderness  Neg  CMC grind    Neg  Circumduction test   Neg  TFCC Compression Test  Neg    ROM of left thumb limited. No extension of the IP joint of the thumb. MP joint extension and opposition intact.  ROM of right upper ext WNL    Neurovascular Exam:  Digits WWP, brisk CR < 3s throughout  NVI motor/LTS to M/R/U nerves, radial pulse 2+  2+ biceps and brachioradialis reflexes  Tinel's Test - Carpal Tunnel  Neg  Tinel's Test - Cubital Tunnel  Neg  Phalen's Test    Neg  Median Nerve Compression Test Neg      ASSESSMENT/PLAN:      71 y.o. yo male with laceration of the extensor pollicis longus of the left thumb    Plan:    We have discussed conservative vs. surgical treatment as well as risks, benefits and alternatives for left thumb wound with EPL laceration.  Conservative measures have been exhausted and he would like to proceed with surgery. Surgery would include Left thumb wound exploration, I&D and EPL tendon repair. Light use of the hand will be indicated for the first 4-6 weeks. His wound was dressed and he was put in a thumb spica splint today.     We have discussed risks of hand surgery which include but are not limited to blood clots in the legs that can travel to the lungs (pulmonary embolism). Pulmonary embolism can cause shortness of breath, chest pain, and even shock. Other risks include urinary tract infection, nausea and vomiting (usually related to pain medication), chronic pain, bleeding, nerve damage, blood vessel injury, scarring and infection of the hand which can require re-operation. Furthermore, the risks of anesthesia include potential heart, lung, kidney,  and liver damage.  Informed consent was obtained.  The patient understands and would like to proceed with surgery in the near future.      The patient's pathophysiology was explained in detail with reference to x-rays, models, other visual aids as appropriate.  Treatment options were discussed in detail.  Questions were invited and answered to the patient's satisfaction. I reviewed Primary care , and other specialty's notes to better coordinate patient's care.          Kaye Solis MD     Please be aware that this note has been generated with the assistance of MModal voice-to-text.  Please excuse any spelling or grammatical errors.

## 2022-04-07 ENCOUNTER — HOSPITAL ENCOUNTER (OUTPATIENT)
Facility: HOSPITAL | Age: 72
Discharge: HOME | End: 2022-04-07
Attending: ORTHOPAEDIC SURGERY | Admitting: ORTHOPAEDIC SURGERY
Payer: COMMERCIAL

## 2022-04-07 ENCOUNTER — ANESTHESIA (OUTPATIENT)
Dept: SURGERY | Facility: HOSPITAL | Age: 72
End: 2022-04-07
Payer: COMMERCIAL

## 2022-04-07 VITALS
RESPIRATION RATE: 17 BRPM | BODY MASS INDEX: 26.52 KG/M2 | OXYGEN SATURATION: 97 % | SYSTOLIC BLOOD PRESSURE: 147 MMHG | HEIGHT: 68 IN | HEART RATE: 53 BPM | DIASTOLIC BLOOD PRESSURE: 80 MMHG | WEIGHT: 175 LBS | TEMPERATURE: 98 F

## 2022-04-07 DIAGNOSIS — S61.019A LACERATION OF TENDON OF THUMB: ICD-10-CM

## 2022-04-07 DIAGNOSIS — S61.112A LACERATION OF LEFT THUMB WITHOUT FOREIGN BODY WITH DAMAGE TO NAIL, INITIAL ENCOUNTER: Primary | ICD-10-CM

## 2022-04-07 PROCEDURE — 25000003 PHARM REV CODE 250: Performed by: ORTHOPAEDIC SURGERY

## 2022-04-07 PROCEDURE — 71000033 HC RECOVERY, INTIAL HOUR: Performed by: ORTHOPAEDIC SURGERY

## 2022-04-07 PROCEDURE — D9220A PRA ANESTHESIA: ICD-10-PCS | Mod: ,,, | Performed by: ANESTHESIOLOGY

## 2022-04-07 PROCEDURE — 64415 NJX AA&/STRD BRCH PLXS IMG: CPT | Performed by: ANESTHESIOLOGY

## 2022-04-07 PROCEDURE — 25000003 PHARM REV CODE 250: Performed by: NURSE ANESTHETIST, CERTIFIED REGISTERED

## 2022-04-07 PROCEDURE — 63600175 PHARM REV CODE 636 W HCPCS: Performed by: PHYSICIAN ASSISTANT

## 2022-04-07 PROCEDURE — 94761 N-INVAS EAR/PLS OXIMETRY MLT: CPT

## 2022-04-07 PROCEDURE — 25000003 PHARM REV CODE 250: Performed by: ANESTHESIOLOGY

## 2022-04-07 PROCEDURE — 37000009 HC ANESTHESIA EA ADD 15 MINS: Performed by: ORTHOPAEDIC SURGERY

## 2022-04-07 PROCEDURE — 99900035 HC TECH TIME PER 15 MIN (STAT)

## 2022-04-07 PROCEDURE — 36000706: Performed by: ORTHOPAEDIC SURGERY

## 2022-04-07 PROCEDURE — 36000707: Performed by: ORTHOPAEDIC SURGERY

## 2022-04-07 PROCEDURE — D9220A PRA ANESTHESIA: Mod: ,,, | Performed by: ANESTHESIOLOGY

## 2022-04-07 PROCEDURE — 76942 ECHO GUIDE FOR BIOPSY: CPT | Mod: 26,,, | Performed by: ANESTHESIOLOGY

## 2022-04-07 PROCEDURE — 64417 PR NERVE BLOCK INJ, ANES/STEROID, AXILLARY, INCL IMAG GUIDANCE: ICD-10-PCS | Mod: 59,LT,, | Performed by: ANESTHESIOLOGY

## 2022-04-07 PROCEDURE — 76942 PR U/S GUIDANCE FOR NEEDLE GUIDANCE: ICD-10-PCS | Mod: 26,,, | Performed by: ANESTHESIOLOGY

## 2022-04-07 PROCEDURE — 63600175 PHARM REV CODE 636 W HCPCS: Performed by: ANESTHESIOLOGY

## 2022-04-07 PROCEDURE — 71000015 HC POSTOP RECOV 1ST HR: Performed by: ORTHOPAEDIC SURGERY

## 2022-04-07 PROCEDURE — 27201423 OPTIME MED/SURG SUP & DEVICES STERILE SUPPLY: Performed by: ORTHOPAEDIC SURGERY

## 2022-04-07 PROCEDURE — 64417 NJX AA&/STRD AX NERVE IMG: CPT | Mod: 59,LT,, | Performed by: ANESTHESIOLOGY

## 2022-04-07 PROCEDURE — 63600175 PHARM REV CODE 636 W HCPCS: Performed by: NURSE ANESTHETIST, CERTIFIED REGISTERED

## 2022-04-07 PROCEDURE — 26418 REPAIR FINGER TENDON: CPT | Mod: FA,,, | Performed by: ORTHOPAEDIC SURGERY

## 2022-04-07 PROCEDURE — 26418 PR REPAIR EXTEN TENDON,DORSUM FINGR,EA: ICD-10-PCS | Mod: 51,FA,, | Performed by: ORTHOPAEDIC SURGERY

## 2022-04-07 PROCEDURE — 25000003 PHARM REV CODE 250: Performed by: PHYSICIAN ASSISTANT

## 2022-04-07 PROCEDURE — 37000008 HC ANESTHESIA 1ST 15 MINUTES: Performed by: ORTHOPAEDIC SURGERY

## 2022-04-07 RX ORDER — HYDROCODONE BITARTRATE AND ACETAMINOPHEN 10; 325 MG/1; MG/1
1 TABLET ORAL EVERY 6 HOURS PRN
Qty: 20 TABLET | Refills: 0 | Status: SHIPPED | OUTPATIENT
Start: 2022-04-07 | End: 2022-07-11

## 2022-04-07 RX ORDER — ACETAMINOPHEN 325 MG/1
650 TABLET ORAL EVERY 4 HOURS PRN
Status: DISCONTINUED | OUTPATIENT
Start: 2022-04-07 | End: 2022-04-07 | Stop reason: HOSPADM

## 2022-04-07 RX ORDER — PROMETHAZINE HYDROCHLORIDE 25 MG/1
25 TABLET ORAL EVERY 6 HOURS PRN
Qty: 15 TABLET | Refills: 0 | Status: SHIPPED | OUTPATIENT
Start: 2022-04-07 | End: 2023-01-09

## 2022-04-07 RX ORDER — OXYCODONE HYDROCHLORIDE 5 MG/1
5 TABLET ORAL
Status: DISCONTINUED | OUTPATIENT
Start: 2022-04-07 | End: 2022-04-07 | Stop reason: HOSPADM

## 2022-04-07 RX ORDER — MIDAZOLAM HYDROCHLORIDE 1 MG/ML
0.5 INJECTION INTRAMUSCULAR; INTRAVENOUS
Status: DISCONTINUED | OUTPATIENT
Start: 2022-04-07 | End: 2022-12-14

## 2022-04-07 RX ORDER — CELECOXIB 200 MG/1
400 CAPSULE ORAL
Status: COMPLETED | OUTPATIENT
Start: 2022-04-07 | End: 2022-04-07

## 2022-04-07 RX ORDER — FENTANYL CITRATE 50 UG/ML
INJECTION, SOLUTION INTRAMUSCULAR; INTRAVENOUS
Status: DISCONTINUED | OUTPATIENT
Start: 2022-04-07 | End: 2022-04-07

## 2022-04-07 RX ORDER — FENTANYL CITRATE 50 UG/ML
25 INJECTION, SOLUTION INTRAMUSCULAR; INTRAVENOUS EVERY 5 MIN PRN
Status: DISCONTINUED | OUTPATIENT
Start: 2022-04-07 | End: 2022-04-07 | Stop reason: HOSPADM

## 2022-04-07 RX ORDER — EPINEPHRINE 1 MG/ML
INJECTION, SOLUTION INTRACARDIAC; INTRAMUSCULAR; INTRAVENOUS; SUBCUTANEOUS
Status: DISCONTINUED
Start: 2022-04-07 | End: 2022-04-07 | Stop reason: HOSPADM

## 2022-04-07 RX ORDER — KETAMINE HCL IN 0.9 % NACL 50 MG/5 ML
SYRINGE (ML) INTRAVENOUS
Status: DISCONTINUED | OUTPATIENT
Start: 2022-04-07 | End: 2022-04-07

## 2022-04-07 RX ORDER — CEFAZOLIN SODIUM/WATER 2 G/20 ML
2 SYRINGE (ML) INTRAVENOUS
Status: COMPLETED | OUTPATIENT
Start: 2022-04-07 | End: 2022-04-07

## 2022-04-07 RX ORDER — FENTANYL CITRATE 50 UG/ML
25 INJECTION, SOLUTION INTRAMUSCULAR; INTRAVENOUS EVERY 5 MIN PRN
Status: DISCONTINUED | OUTPATIENT
Start: 2022-04-07 | End: 2022-12-14

## 2022-04-07 RX ORDER — MUPIROCIN 20 MG/G
OINTMENT TOPICAL
Status: DISCONTINUED | OUTPATIENT
Start: 2022-04-07 | End: 2022-04-07 | Stop reason: HOSPADM

## 2022-04-07 RX ORDER — FAMOTIDINE 10 MG/ML
INJECTION INTRAVENOUS
Status: DISCONTINUED | OUTPATIENT
Start: 2022-04-07 | End: 2022-04-07

## 2022-04-07 RX ORDER — ACETAMINOPHEN 500 MG
1000 TABLET ORAL
Status: COMPLETED | OUTPATIENT
Start: 2022-04-07 | End: 2022-04-07

## 2022-04-07 RX ORDER — PROPOFOL 10 MG/ML
VIAL (ML) INTRAVENOUS
Status: DISCONTINUED | OUTPATIENT
Start: 2022-04-07 | End: 2022-04-07

## 2022-04-07 RX ORDER — BACITRACIN ZINC 500 UNIT/G
OINTMENT (GRAM) TOPICAL
Status: DISCONTINUED | OUTPATIENT
Start: 2022-04-07 | End: 2022-04-07 | Stop reason: HOSPADM

## 2022-04-07 RX ORDER — ONDANSETRON 2 MG/ML
INJECTION INTRAMUSCULAR; INTRAVENOUS
Status: DISCONTINUED | OUTPATIENT
Start: 2022-04-07 | End: 2022-04-07

## 2022-04-07 RX ORDER — PROPOFOL 10 MG/ML
VIAL (ML) INTRAVENOUS CONTINUOUS PRN
Status: DISCONTINUED | OUTPATIENT
Start: 2022-04-07 | End: 2022-04-07

## 2022-04-07 RX ORDER — LIDOCAINE HYDROCHLORIDE 20 MG/ML
INJECTION INTRAVENOUS
Status: DISCONTINUED | OUTPATIENT
Start: 2022-04-07 | End: 2022-04-07

## 2022-04-07 RX ADMIN — PROPOFOL 20 MG: 10 INJECTION, EMULSION INTRAVENOUS at 01:04

## 2022-04-07 RX ADMIN — MIDAZOLAM 2 MG: 1 INJECTION INTRAMUSCULAR; INTRAVENOUS at 12:04

## 2022-04-07 RX ADMIN — MEPIVACAINE HYDROCHLORIDE 35 ML: 15 INJECTION, SOLUTION EPIDURAL; INFILTRATION at 12:04

## 2022-04-07 RX ADMIN — MUPIROCIN: 20 OINTMENT TOPICAL at 11:04

## 2022-04-07 RX ADMIN — SODIUM CHLORIDE: 9 INJECTION, SOLUTION INTRAVENOUS at 11:04

## 2022-04-07 RX ADMIN — LIDOCAINE HYDROCHLORIDE 60 MG: 20 INJECTION, SOLUTION INTRAVENOUS at 01:04

## 2022-04-07 RX ADMIN — ONDANSETRON 4 MG: 2 INJECTION, SOLUTION INTRAMUSCULAR; INTRAVENOUS at 02:04

## 2022-04-07 RX ADMIN — Medication 2 G: at 01:04

## 2022-04-07 RX ADMIN — ACETAMINOPHEN 1000 MG: 500 TABLET ORAL at 11:04

## 2022-04-07 RX ADMIN — Medication 10 MG: at 01:04

## 2022-04-07 RX ADMIN — PROPOFOL 100 MCG/KG/MIN: 10 INJECTION, EMULSION INTRAVENOUS at 01:04

## 2022-04-07 RX ADMIN — SODIUM CHLORIDE: 9 INJECTION, SOLUTION INTRAVENOUS at 02:04

## 2022-04-07 RX ADMIN — FAMOTIDINE 20 MG: 10 INJECTION, SOLUTION INTRAVENOUS at 02:04

## 2022-04-07 RX ADMIN — CELECOXIB 400 MG: 200 CAPSULE ORAL at 11:04

## 2022-04-07 RX ADMIN — FENTANYL CITRATE 25 MCG: 50 INJECTION, SOLUTION INTRAMUSCULAR; INTRAVENOUS at 01:04

## 2022-04-07 NOTE — PLAN OF CARE
Patient prepped for pre-op. Wife states she will take his cell phone and glasses. Patient belonging bag with clothes and shoes in locker #13. Called into OR #1 and per erika Keller to start nerve block.

## 2022-04-07 NOTE — PLAN OF CARE
VSS. Patient able to tolerate oral liquids. Patient denies c/o pain. Dressing intact. No distress noted. Patient states he is ready for discharge. Discharge instructions reviewed with patient and family, verbalized understanding. IV discontinued with catheter tip intact. Family at bedside to help patient dress. Patient voided. Patient wheeled to lobby via staff.

## 2022-04-07 NOTE — ANESTHESIA PROCEDURE NOTES
L axillary SS    Patient location during procedure: pre-op   Block not for primary anesthetic.  Reason for block: at surgeon's request and post-op pain management   Post-op Pain Location: left  hand pain   Start time: 4/7/2022 12:16 PM  Timeout: 4/7/2022 12:14 PM   End time: 4/7/2022 12:21 PM    Staffing  Authorizing Provider: Cici Bhagat MD  Performing Provider: Cici Bhagat MD    Preanesthetic Checklist  Completed: patient identified, IV checked, site marked, risks and benefits discussed, surgical consent, monitors and equipment checked, pre-op evaluation and timeout performed  Peripheral Block  Patient position: supine  Prep: ChloraPrep  Patient monitoring: heart rate, cardiac monitor, continuous pulse ox, continuous capnometry and frequent blood pressure checks  Block type: axillary  Laterality: left  Injection technique: single shot  Needle  Needle type: Echogenic   Needle gauge: 21 G  Needle length: 4 in  Needle localization: ultrasound guidance  Needle insertion depth: 2 cm   -ultrasound image captured on disc.  Assessment  Injection assessment: negative parasthesia, local visualized surrounding nerve and negative aspiration  Paresthesia pain: none  Heart rate change: no  Slow fractionated injection: yes  Pain Tolerance: comfortable throughout block and no complaints  Medications:    Medications: mepivacaine (CARBOCAINE) injection 15 mg/mL (1.5%) - Perineural   35 mL - 4/7/2022 12:18:00 PM    Additional Notes  1.5 % mepiv with 1: 400 K epi

## 2022-04-07 NOTE — ANESTHESIA PREPROCEDURE EVALUATION
04/07/2022  Raffy Rutherford Jr. is a 71 y.o., male   Pre-operative evaluation for Procedure(s) (LRB):  REPAIR, TENDON, EXTENSOR thumb (Left)  IRRIGATION AND DEBRIDEMENT, UPPER EXTREMITY (Left)    Raffy Rutherford Jr. is a 71 y.o. male     Patient Active Problem List   Diagnosis    Depression    Hyperlipidemia    Chronic pain    Adenomatous polyp    GERD (gastroesophageal reflux disease)    Back pain    Sleep apnea    Visual disturbances    Spondylosis without myelopathy    Degeneration of lumbar or lumbosacral intervertebral disc    Spinal stenosis, lumbar region, without neurogenic claudication    Thoracic or lumbosacral neuritis or radiculitis, unspecified    Displacement of lumbar intervertebral disc without myelopathy    Acquired spondylolisthesis    Lumbago    Status post cataract extraction and insertion of intraocular lens - Both Eyes    Fibromyalgia    OP (osteoporosis)    Compression fracture of T12 vertebra    Diverticulitis large intestine    Osteoarthritis    Lower back pain    Lower extremity pain    Muscle weakness    Range of motion deficit    Facet joint disease of cervical region    Occipital neuralgia    Chronic migraine without aura, with intractable migraine, so stated, with status migrainosus    Cervical radiculopathy    Paroxysmal hemicrania    Sciatic nerve pain    Chronic LBP    DJD (degenerative joint disease) of lumbar spine    Chronic neck pain    Right lumbar radiculopathy    Thyroid nodule    Carpal tunnel syndrome of right wrist    Paresthesia    Degenerative disc disease    S/P lumbar spinal fusion    Right wrist tendinitis    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    Headache around the eyes    Closed fracture of left distal radius    Bilateral foot-drop    Idiopathic peripheral neuropathy    Sacroiliac joint  dysfunction of right side    SI (sacroiliac) joint dysfunction    Episodic migraine without status migrainosus, not intractable    Lumbar disc herniation with radiculopathy    Anxiety about health    MDD (major depressive disorder), recurrent episode, moderate    Anxiety    History of colon polyps    Iron deficiency anemia    Sicca syndrome with keratoconjunctivitis    DDD (degenerative disc disease), lumbosacral    Spondylolisthesis at L5-S1 level    CTS (carpal tunnel syndrome)    Pain in left hand    Right carpal tunnel syndrome    Decreased range of motion of neck    Poor posture    Decreased strength of upper extremity    Prostate cancer    Impaired gait    Balance problems    Genetic disorder    Bilateral carotid artery stenosis    Other specified diseases of spinal cord    Chronic pain of left hand       Review of patient's allergies indicates:   Allergen Reactions    Alphagan [brimonidine]      Patient taking MASO-B Selective Inhibitor Selegiline (Emsam)    Coumadin [warfarin]      itch    Oxycodone      hiccups       Current Facility-Administered Medications on File Prior to Encounter   Medication Dose Route Frequency Provider Last Rate Last Admin    fentaNYL injection 25 mcg  25 mcg Intravenous Q5 Min PRN Cici Lieberman MD   50 mcg at 12/16/20 0745    midazolam (VERSED) 1 mg/mL injection 0.5 mg  0.5 mg Intravenous PRN Cici Lieberman MD   1 mg at 12/16/20 0749     Current Outpatient Medications on File Prior to Encounter   Medication Sig Dispense Refill    cephALEXin (KEFLEX) 500 MG capsule Take 1 capsule (500 mg total) by mouth every 12 (twelve) hours. for 3 days 6 capsule 0    clindamycin (CLEOCIN) 150 MG capsule Take 150 mg by mouth 4 (four) times daily.      clonazePAM (KLONOPIN) 0.5 MG tablet Take 1 tablet by mouth twice a day as needed for anxiety 60 tablet 5    clonazePAM (KLONOPIN) 0.5 MG tablet Take 1 tablet by mouth twice a day as needed for anxiety 60 tablet  5    HYDROcodone-acetaminophen (NORCO) 5-325 mg per tablet Take 1 tablet by mouth three times daily as needed for pain. 90 tablet 0    lamoTRIgine (LAMICTAL) 200 MG tablet Take 1 tablet (200 mg total) by mouth once daily. 90 tablet 3    mupirocin (BACTROBAN) 2 % ointment Apply topically 2 (two) times daily. for 5 days 22 g 0    pilocarpine HCl (VUITY) 1.25 % Drop Apply 1 drop to each eye once daily. 5 mL 6    pravastatin (PRAVACHOL) 40 MG tablet Take 1 tablet (40 mg total) by mouth once daily. 90 tablet 3    pregabalin (LYRICA) 25 MG capsule Take 1 capsule by mouth twice daily during the day and 1 capsule at bedtime 90 capsule 2    pregabalin (LYRICA) 75 MG capsule Take 1 capsule(s) twice a day by oral route in the evening for 30 days. 60 capsule 3    pregabalin (LYRICA) 75 MG capsule Take 3 capsules by mouth at night 90 capsule 2    RABEprazole (ACIPHEX) 20 mg tablet Take 1 tablet (20 mg total) by mouth 2 (two) times daily. 180 tablet 3    rimegepant (NURTEC) 75 mg odt Take 1 tablet (75 mg total) by mouth every other day. Place ODT tablet on the tongue; alternatively the ODT tablet may be placed under the tongue 16 tablet 2    rimegepant (NURTEC) 75 mg odt Take 1 tablet every day by mouth as directed for 30 days. 8 tablet 3    rimegepant (NURTEC) 75 mg odt Take 1 tablet every day by oral route as directed for 30 days. 8 tablet 3    tadalafiL (CIALIS) 5 MG tablet Take 1 tablet (5 mg total) by mouth daily as needed for Erectile Dysfunction. 120 tablet 3    varenicline (TYRVAYA) 0.03 mg/spray sprm 1 spray by Nasal route 2 (two) times a day. 4.2 mL 6    [DISCONTINUED] selegiline (EMSAM) 6 mg/24 hr Place 1 patch onto the skin once daily - TOLD TO WASHOUT TRAZODONE FIRST 30 patch 11       Past Surgical History:   Procedure Laterality Date    BACK SURGERY      CARPAL TUNNEL RELEASE Right 5/18/2021    Procedure: RELEASE, CARPAL TUNNEL;  Surgeon: Kaye Solis MD;  Location: [facility];  Service:  Orthopedics;  Laterality: Right;    CATARACT EXTRACTION W/  INTRAOCULAR LENS IMPLANT Bilateral     CHOLECYSTECTOMY      COLONOSCOPY N/A 12/17/2019    Normal - Repeat 5yrs    COLONOSCOPY  2007 2007 TA x2, 2011 TA x3, 2014 HP x3, 2019 normal    COSMETIC SURGERY  2/10/2015    Direct mid-forehead brow lift    COSMETIC SURGERY  2/10/2015    Bilateral upper lid blepahroplasty    CYSTOSCOPY WITH URETEROSCOPY, RETROGRADE PYELOGRAPHY, AND INSERTION OF STENT Left 8/19/2020    Procedure: CYSTOSCOPY, WITH RETROGRADE PYELOGRAM AND URETERAL STENT INSERTION;  Surgeon: Katelynn George MD;  Location: [facility];  Service: Urology;  Laterality: Left;    ESOPHAGOGASTRODUODENOSCOPY N/A 12/17/2019    Procedure: ESOPHAGOGASTRODUODENOSCOPY (EGD);  Surgeon: Amadou Hardin MD;  Location: [facility] ([address] [place] [address]);  Service: Endoscopy;  Laterality: N/A;    EYE SURGERY      FUSION OF LUMBAR SPINE BY ANTERIOR APPROACH N/A 8/20/2020    Procedure: FUSION, SPINE, LUMBAR, ANTERIOR APPROACH L5-S1 ALIF Stand Alone;  Surgeon: Jason Caldwell MD;  Location: [facility];  Service: Neurosurgery;  Laterality: N/A;    HEMORRHOID SURGERY      with complication of chronic bleeding for 6 weeks     INJECTION OF STEROID Right 12/6/2018    Procedure: INJECTION, STEROID Right SI Joint Block and Steroid Injection;  Surgeon: Jason Caldwell MD;  Location: [facility] OR;  Service: Neurosurgery;  Laterality: Right;  Procedure: Right SI Joint Block and Steroid Injection  Surgery Time: 30 MIN  LOS: 0  Anesthesia: MAC  Radiology: C-arm  Bed: [place] 4 Poster  Position: Prone    INJECTION OF STEROID Right 9/19/2019    Procedure: INJECTION, STEROID Procedure: Right SI joint block nd steroid injection;  Surgeon: Jason Caldwell MD;  Location: [facility];  Service: Neurosurgery;  Laterality: Right;  Procedure: Right SI joint block nd steroid injection  Surgery Time: 30 mins  LOS:   Anesthesia: General MAC  Radiology:C-arm  Bed: Regular Bed  Position: Prone    JOINT  REPLACEMENT      KNEE SURGERY      involving arthroscopic surgery to both knees    SINUS SURGERY      left molar and sinus surgery for trigeminal neuralgia    SPINAL FUSION  06/22/2015    L3-L5 XLIF/TANA    TOTAL HIP ARTHROPLASTY  4/2012    Pt states he had total hip replacement on his left hip.    ULNAR NERVE TRANSPOSITION Left 12/16/2020    Procedure: TRANSPOSITION, NERVE, ULNAR - left carpal and cubital tunnel releases;  Surgeon: Addi Bauer MD;  Location: [facility];  Service: Orthopedics;  Laterality: Left;         CBC:  Lab Results   Component Value Date    WBC 5.95 03/25/2022    RBC 4.98 03/25/2022    HGB 15.2 03/25/2022    HCT 47.5 03/25/2022     03/25/2022    MCV 95 03/25/2022    MCH 30.5 03/25/2022    MCHC 32.0 03/25/2022       CMP:   Lab Results   Component Value Date     03/25/2022    K 4.2 03/25/2022     03/25/2022    CO2 27 03/25/2022    BUN 14 03/25/2022    CREATININE 0.8 03/25/2022     (H) 03/25/2022    CALCIUM 9.6 03/25/2022    ALBUMIN 4.0 03/25/2022    PROT 7.1 03/25/2022    ALKPHOS 43 (L) 03/25/2022    ALT 15 03/25/2022    AST 17 03/25/2022    BILITOT 0.5 03/25/2022       INR:  No results found for: PT, INR, PROTIME, APTT      Diagnostic Studies:      EKG:   Results for orders placed or performed during the hospital encounter of 08/03/20   EKG 12-lead    Collection Time: 08/03/20 11:51 AM    Narrative    Test Reason : Z01.818,    Vent. Rate : 059 BPM     Atrial Rate : 059 BPM     P-R Int : 154 ms          QRS Dur : 106 ms      QT Int : 412 ms       P-R-T Axes : 052 -41 031 degrees     QTc Int : 407 ms    Sinus bradycardia  Left axis deviation  Abnormal ECG  When compared with ECG of 17-DEC-2018 07:42,  QT has shortened  Confirmed by Eduin Centeno MD ([id number]) on 8/3/2020 11:07:20 PM    Referred By: DEMETRICE PERKINS           Confirmed By:Eduin Centeno MD        2D Echo:  No results found. However, due to the size of the patient record, not all encounters were searched.  "Please check Results Review for a complete set of results.    Stress Test:   No results found for this or any previous visit.      Pre-op Vitals [04/07/22 1123]   BP Pulse Resp Temp SpO2   129/78 (!) 51 16 36.3 °C (97.3 °F) 96 %      Height Weight BMI (Calculated)     5' 8" 175 lb 26.6         Pre-op Vitals [04/07/22 1123]   BP Pulse Resp Temp SpO2   129/78 (!) 51 16 36.3 °C (97.3 °F) 96 %      Height Weight BMI (Calculated)     5' 8" 175 lb 26.6        .      Pre-op Assessment          Review of Systems      Physical Exam  General: Well nourished    Airway:  Mallampati: I / I  Mouth Opening: Normal  TM Distance: Normal  Tongue: Normal  Neck ROM: Normal ROM    Dental:  Intact    Chest/Lungs:  Clear to auscultation    Heart:  Rate: Normal  Rhythm: Regular Rhythm  Sounds: Normal        Anesthesia Plan  Type of Anesthesia, risks & benefits discussed:    Anesthesia Type: MAC, Gen ETT, Gen Supraglottic Airway, Gen Natural Airway, Regional  Intra-op Monitoring Plan: Standard ASA Monitors  Post Op Pain Control Plan: multimodal analgesia and peripheral nerve block  Induction:  IV  Airway Plan: Direct  Informed Consent: Informed consent signed with the Patient and all parties understand the risks and agree with anesthesia plan.  All questions answered.   ASA Score: 2  Day of Surgery Review of History & Physical: H&P Update referred to the surgeon/provider.    Ready For Surgery From Anesthesia Perspective.     .      "

## 2022-04-07 NOTE — ANESTHESIA POSTPROCEDURE EVALUATION
Anesthesia Post Evaluation    Patient: Raffy Rutherford Jr.    Procedure(s) Performed: Procedure(s) (LRB):  REPAIR, TENDON, EXTENSOR thumb, EPB and EPL (Left)  IRRIGATION AND DEBRIDEMENT, UPPER EXTREMITY (Left)    Final Anesthesia Type: general      Patient location during evaluation: PACU  Patient participation: Yes- Able to Participate  Level of consciousness: awake and alert and oriented  Post-procedure vital signs: reviewed and stable  Pain management: adequate  Airway patency: patent    PONV status at discharge: No PONV  Anesthetic complications: no      Cardiovascular status: stable  Respiratory status: unassisted  Hydration status: euvolemic  Follow-up not needed.          Vitals Value Taken Time   /80 04/07/22 1516   Temp 36.6 °C (97.8 °F) 04/07/22 1515   Pulse 54 04/07/22 1517   Resp 15 04/07/22 1517   SpO2 97 % 04/07/22 1517   Vitals shown include unvalidated device data.      Event Time   Out of Recovery 15:15:00         Pain/Tequila Score: Pain Rating Prior to Med Admin: 6 (4/7/2022 11:44 AM)  Tequila Score: 9 (4/7/2022  3:15 PM)

## 2022-04-07 NOTE — TRANSFER OF CARE
"Anesthesia Transfer of Care Note    Patient: Raffy Rutherford Jr.    Procedure(s) Performed: Procedure(s) (LRB):  REPAIR, TENDON, EXTENSOR thumb, EPB and EPL (Left)  IRRIGATION AND DEBRIDEMENT, UPPER EXTREMITY (Left)    Patient location: PACU    Anesthesia Type: regional and general    Transport from OR: Transported from OR on 6-10 L/min O2 by face mask with adequate spontaneous ventilation    Post pain: adequate analgesia    Post assessment: tolerated procedure well and no apparent anesthetic complications    Post vital signs: stable    Level of consciousness: awake and sedated    Nausea/Vomiting: no nausea/vomiting    Complications: none    Transfer of care protocol was followed      Last vitals:   Visit Vitals  BP (!) 124/59 (BP Location: Right leg, Patient Position: Lying)   Pulse (!) 58   Temp 36.3 °C (97.4 °F) (Temporal)   Resp 18   Ht 5' 8" (1.727 m)   Wt 79.4 kg (175 lb)   SpO2 98%   BMI 26.61 kg/m²     "

## 2022-04-07 NOTE — PATIENT INSTRUCTIONS
Medication Instructions:     1. Take 600mg Ibuprofen every 8 hours as needed for pain.     2. Take 1000mg Tylenol up to twice a day as needed for pain.     3. Take 1 Norco every 6 hours as needed for pain.     4. Take 25mg Phenergan every 6 hours as needed for nausea.     5. Take 100mg Colace up to 2 times daily as needed for constipation.

## 2022-04-07 NOTE — OP NOTE
[facility])  Surgery Department  Operative Note    SUMMARY     Date of Procedure: 4/7/2022     Procedure:   1. Left extensor pollicis longus primary repair, zone T 3, cpt 56693  2. Left extensor pollicis brevis primary repair, zone T3, cpt 96803  3. Irrigation and excisional debridement left hand skin, subcutaneous tissue and tendon, cpt 40075  4. Complex wound exploration and closure, cpt 46495    Surgeon(s) and Role:     * Kaye Solis MD - Primary    Assisting Surgeon: Olivia Jacques MD    Anesthesia: General     Pre-Operative Diagnosis: Extensor tendon laceration of left hand with open wound, initial encounter [S66.822A, S61.402A]  Left hand complex wound     Post-Operative Diagnosis: Post-Op Diagnosis Codes:     * Extensor tendon laceration of left hand with open wound, initial encounter [S66.822A, S61.402A]  Left hand complex wound  EPL and EPB tendon lacerations in zone T3 left thumb     Anesthesia: Regional     Indication for Procedure: 72 yo male who was cut by glass and sustained an open wound to the left hand and inability to extend the thumb IP joint.  Risks and benefits of the procedure were discussed with the patient and informed consent was obtained.     Description of the Findings of the Procedure: The patient was seen in the preoperative holding area and the left hand was marked.  A regional block of the left arm was performed in the preoperative holding area.  The patient was taken to the OR, placed supine on the table, and a padded hand table was used. After monitored anesthesia care was admitted without difficulty, a time-out procedure was performed identifying the patient, the operative site and the procedure to be performed.  A tourniquet was placed on the arm and the upper extremity was prepped and draped in standard sterile fashion. The left upper extremity was elevated and exsanguinated with an Esmarch bandage, and the tourniquet was inflated to 250 mm Hg.       The 3 cm  transverse laceration over the dorsum of the thumb metacarpal, just proximal to the MP joint was extended 2 cm both proximally and distally.  Careful dissection was performed with Littler scissors down to the extensor sheath.  The EPL tendon proximal stump was found retracted and brought into the wound with a hemostat, the distal tendon was also identified. The EPB tendon was also 30% lacerated. The APL was intact as well as the ECRL and ECRB. Sharp excisional debridement was performed of any nonviable skin, subcutaneous tissues and tendon.  The wound was copiously irrigated with normal saline using bulb syringe.  4-0 FiberWire was used to primarily repair the EPL tendon in a modified Welsh manner, with a total of 4 core sutures in the tendon.  There was good tendon opposition and no gapping at the repair site.  A 5-0 Prolene was used for an epitendinous running Silfverskiold stitch.  The thumb was put through range of motion and there was good approximation of the tendon with no gapping.     The neurovascular bundles were identified and protected throughout the case.  Bipolar electrocautery was used for hemostasis.  The wound was then irrigated with normal saline using a bulb syringe.  4-0 Monocryl was used for subcutaneous closure and 3-0 prolene was used to close the skin in a horizontal mattress fashion to close the complex wound. Adaptic, 4x4, cast padding, a volar blocking splint and coban were used to dress the wrist and thumb. The tourniquet was deflated and the hand and fingers were pink and well-perfused.  The patient tolerated the procedure well.  He was taken awake, alert and in good condition to the recovery room.    Complications: No    Estimated Blood Loss (EBL): minimal           Specimens: none           Condition: Good    Disposition: PACU - hemodynamically stable.    Attestation: I was present and scrubbed for the entire procedure.

## 2022-04-07 NOTE — DISCHARGE INSTRUCTIONS
AFTER HAND SURGERY    DOS:  Keep affected wrist elevated above the level of your heart  Check circulation frequently in fingers by pressing on the nail bed. Nail bed should turn white and then pink when released.  Exercise fingers to promote circulation.  Advance diet as tolerated  Resume home medications today.      DONT:  No driving for 24 hours or while taking narcotic pain medication  DO NOT TAKE ADDITIONAL TYLENOL/ACETAMINOPHEN WHILE TAKING NARCOTIC PAIN MEDICATION THAT CONTAINS TYLENOL/ACETAMINOPHEN.    CALL PHYSICIAN FOR:  Obvious bleeding  Excessive swelling  Drainage (pus) from the wound  Pain unrelieved by pain medication.  If dressing gets wet

## 2022-04-07 NOTE — BRIEF OP NOTE
[facility] - Surgery (Hospital)  Brief Operative Note     SUMMARY     Surgery Date: 4/7/2022     Surgeon(s) and Role:     * Kaye Solis MD - Primary    Assistant: Olivia Jacques MD    Pre-op Diagnosis:  Extensor tendon laceration of finger with open wound, initial encounter [S56.429A, S61.209A]    Post-op Diagnosis:  Post-Op Diagnosis Codes:     * Extensor tendon laceration of finger with open wound, initial encounter [S56.429A, S61.209A]    Procedure(s) (LRB):  REPAIR, TENDON, EXTENSOR thumb, EPB and EPL (Left)  IRRIGATION AND DEBRIDEMENT, UPPER EXTREMITY (Left)    Anesthesia: General    Description of the findings of the procedure: full laceration of the EPL, Partial laceration of EPB    Findings/Key Components: See operative report    Estimated Blood Loss: * No values recorded between 4/7/2022  1:52 PM and 4/7/2022  2:43 PM *         Specimens:   Specimen (24h ago, onward)            None          Implants: * No implants in log *    Complications: None    Discharge Note    SUMMARY     Admit Date: 4/7/2022    Discharge Date and Time:  04/07/2022 2:43 PM    Hospital Course: Patient was placed in observation status for the above procedure.  See above.  Postoperatively was discharged home from the PACU once criteria was met.    Final Diagnosis: Post-Op Diagnosis Codes:     * Extensor tendon laceration of finger with open wound, initial encounter [S56.429A, S61.209A]    Disposition: Home or Self Care    Follow Up/Patient Instructions:     Medications:  Reconciled Home Medications:      Medication List      START taking these medications    promethazine 25 MG tablet  Commonly known as: PHENERGAN  Take 1 tablet (25 mg total) by mouth every 6 (six) hours as needed for Nausea.        CHANGE how you take these medications    * HYDROcodone-acetaminophen 5-325 mg per tablet  Commonly known as: NORCO  Take 1 tablet by mouth three times daily as needed for pain.  What changed: Another medication with the same name was added.  Make sure you understand how and when to take each.     * HYDROcodone-acetaminophen  mg per tablet  Commonly known as: NORCO  Take 1 tablet by mouth every 6 (six) hours as needed for Pain.  What changed: You were already taking a medication with the same name, and this prescription was added. Make sure you understand how and when to take each.         * This list has 2 medication(s) that are the same as other medications prescribed for you. Read the directions carefully, and ask your doctor or other care provider to review them with you.            CONTINUE taking these medications    cephALEXin 500 MG capsule  Commonly known as: KEFLEX  Take 1 capsule (500 mg total) by mouth every 12 (twelve) hours. for 3 days     clindamycin 150 MG capsule  Commonly known as: CLEOCIN  Take 150 mg by mouth 4 (four) times daily.     * clonazePAM 0.5 MG tablet  Commonly known as: KlonoPIN  Take 1 tablet by mouth twice a day as needed for anxiety     * clonazePAM 0.5 MG tablet  Commonly known as: KlonoPIN  Take 1 tablet by mouth twice a day as needed for anxiety     lamoTRIgine 200 MG tablet  Commonly known as: LAMICTAL  Take 1 tablet (200 mg total) by mouth once daily.     mupirocin 2 % ointment  Commonly known as: BACTROBAN  Apply topically 2 (two) times daily. for 5 days     * NURTEC 75 mg odt  Generic drug: rimegepant  Take 1 tablet (75 mg total) by mouth every other day. Place ODT tablet on the tongue; alternatively the ODT tablet may be placed under the tongue     * NURTEC 75 mg odt  Generic drug: rimegepant  Take 1 tablet every day by mouth as directed for 30 days.     * NURTEC 75 mg odt  Generic drug: rimegepant  Take 1 tablet every day by oral route as directed for 30 days.     pravastatin 40 MG tablet  Commonly known as: PRAVACHOL  Take 1 tablet (40 mg total) by mouth once daily.     * pregabalin 75 MG capsule  Commonly known as: LYRICA  Take 1 capsule(s) twice a day by oral route in the evening for 30 days.     *  pregabalin 75 MG capsule  Commonly known as: LYRICA  Take 3 capsules by mouth at night     * pregabalin 25 MG capsule  Commonly known as: LYRICA  Take 1 capsule by mouth twice daily during the day and 1 capsule at bedtime     RABEprazole 20 mg tablet  Commonly known as: ACIPHEX  Take 1 tablet (20 mg total) by mouth 2 (two) times daily.     tadalafiL 5 MG tablet  Commonly known as: CIALIS  Take 1 tablet (5 mg total) by mouth daily as needed for Erectile Dysfunction.     VUITY 1.25 % Drop  Generic drug: pilocarpine HCl  Apply 1 drop to each eye once daily.         * This list has 8 medication(s) that are the same as other medications prescribed for you. Read the directions carefully, and ask your doctor or other care provider to review them with you.              Discharge Procedure Orders   Diet general     Keep surgical extremity elevated     Leave dressing on - Keep it clean, dry, and intact until clinic visit     Call MD for:  redness, tenderness, or signs of infection (pain, swelling, redness, odor or green/yellow discharge around incision site)     Call MD for:  severe uncontrolled pain

## 2022-04-11 ENCOUNTER — CLINICAL SUPPORT (OUTPATIENT)
Dept: REHABILITATION | Facility: HOSPITAL | Age: 72
End: 2022-04-11
Attending: ORTHOPAEDIC SURGERY
Payer: COMMERCIAL

## 2022-04-11 DIAGNOSIS — M25.542 PAIN IN THUMB JOINT WITH MOVEMENT OF LEFT HAND: Primary | ICD-10-CM

## 2022-04-11 DIAGNOSIS — Z78.9 DECREASED ACTIVITIES OF DAILY LIVING (ADL): ICD-10-CM

## 2022-04-11 DIAGNOSIS — S66.229A LACERATION OF EXTENSOR MUSCLE AND TENDON OF THUMB AT WRIST AND HAND LEVEL: ICD-10-CM

## 2022-04-11 PROCEDURE — L3806 WHFO W/JOINT(S) CUSTOM FAB: HCPCS

## 2022-04-13 ENCOUNTER — CLINICAL SUPPORT (OUTPATIENT)
Dept: REHABILITATION | Facility: HOSPITAL | Age: 72
End: 2022-04-13
Attending: ORTHOPAEDIC SURGERY
Payer: COMMERCIAL

## 2022-04-13 ENCOUNTER — PATIENT MESSAGE (OUTPATIENT)
Dept: UROLOGY | Facility: CLINIC | Age: 72
End: 2022-04-13
Payer: COMMERCIAL

## 2022-04-13 DIAGNOSIS — Z78.9 DECREASED ACTIVITIES OF DAILY LIVING (ADL): ICD-10-CM

## 2022-04-13 DIAGNOSIS — M25.542 PAIN IN THUMB JOINT WITH MOVEMENT OF LEFT HAND: ICD-10-CM

## 2022-04-13 PROCEDURE — 97165 OT EVAL LOW COMPLEX 30 MIN: CPT

## 2022-04-14 ENCOUNTER — PATIENT MESSAGE (OUTPATIENT)
Dept: PSYCHIATRY | Facility: CLINIC | Age: 72
End: 2022-04-14
Payer: COMMERCIAL

## 2022-04-14 ENCOUNTER — PATIENT MESSAGE (OUTPATIENT)
Dept: RADIATION ONCOLOGY | Facility: CLINIC | Age: 72
End: 2022-04-14
Payer: COMMERCIAL

## 2022-04-18 ENCOUNTER — OFFICE VISIT (OUTPATIENT)
Dept: OTOLARYNGOLOGY | Facility: CLINIC | Age: 72
End: 2022-04-18
Payer: COMMERCIAL

## 2022-04-18 ENCOUNTER — CLINICAL SUPPORT (OUTPATIENT)
Dept: REHABILITATION | Facility: HOSPITAL | Age: 72
End: 2022-04-18
Payer: COMMERCIAL

## 2022-04-18 VITALS — SYSTOLIC BLOOD PRESSURE: 112 MMHG | HEART RATE: 64 BPM | DIASTOLIC BLOOD PRESSURE: 74 MMHG

## 2022-04-18 DIAGNOSIS — H61.23 IMPACTED CERUMEN, BILATERAL: ICD-10-CM

## 2022-04-18 DIAGNOSIS — Z78.9 DECREASED ACTIVITIES OF DAILY LIVING (ADL): ICD-10-CM

## 2022-04-18 DIAGNOSIS — M25.542 PAIN IN THUMB JOINT WITH MOVEMENT OF LEFT HAND: Primary | ICD-10-CM

## 2022-04-18 DIAGNOSIS — Z86.69 HISTORY OF HEARING LOSS: ICD-10-CM

## 2022-04-18 DIAGNOSIS — Z86.69 HISTORY OF TINNITUS: ICD-10-CM

## 2022-04-18 DIAGNOSIS — Z78.9 HISTORY OF EXCESSIVE CERUMEN: Primary | ICD-10-CM

## 2022-04-18 PROCEDURE — 99999 PR PBB SHADOW E&M-EST. PATIENT-LVL III: CPT | Mod: PBBFAC,,, | Performed by: OTOLARYNGOLOGY

## 2022-04-18 PROCEDURE — 1157F ADVNC CARE PLAN IN RCRD: CPT | Mod: CPTII,S$GLB,, | Performed by: OTOLARYNGOLOGY

## 2022-04-18 PROCEDURE — 3288F FALL RISK ASSESSMENT DOCD: CPT | Mod: CPTII,S$GLB,, | Performed by: OTOLARYNGOLOGY

## 2022-04-18 PROCEDURE — 1101F PT FALLS ASSESS-DOCD LE1/YR: CPT | Mod: CPTII,S$GLB,, | Performed by: OTOLARYNGOLOGY

## 2022-04-18 PROCEDURE — 1160F PR REVIEW ALL MEDS BY PRESCRIBER/CLIN PHARMACIST DOCUMENTED: ICD-10-PCS | Mod: CPTII,S$GLB,, | Performed by: OTOLARYNGOLOGY

## 2022-04-18 PROCEDURE — 97110 THERAPEUTIC EXERCISES: CPT

## 2022-04-18 PROCEDURE — 99499 UNLISTED E&M SERVICE: CPT | Mod: S$GLB,,, | Performed by: OTOLARYNGOLOGY

## 2022-04-18 PROCEDURE — 1126F PR PAIN SEVERITY QUANTIFIED, NO PAIN PRESENT: ICD-10-PCS | Mod: CPTII,S$GLB,, | Performed by: OTOLARYNGOLOGY

## 2022-04-18 PROCEDURE — 3078F PR MOST RECENT DIASTOLIC BLOOD PRESSURE < 80 MM HG: ICD-10-PCS | Mod: CPTII,S$GLB,, | Performed by: OTOLARYNGOLOGY

## 2022-04-18 PROCEDURE — 1101F PR PT FALLS ASSESS DOC 0-1 FALLS W/OUT INJ PAST YR: ICD-10-PCS | Mod: CPTII,S$GLB,, | Performed by: OTOLARYNGOLOGY

## 2022-04-18 PROCEDURE — 3074F PR MOST RECENT SYSTOLIC BLOOD PRESSURE < 130 MM HG: ICD-10-PCS | Mod: CPTII,S$GLB,, | Performed by: OTOLARYNGOLOGY

## 2022-04-18 PROCEDURE — 69210 PR REMOVAL IMPACTED CERUMEN REQUIRING INSTRUMENTATION, UNILATERAL: ICD-10-PCS | Mod: S$GLB,,, | Performed by: OTOLARYNGOLOGY

## 2022-04-18 PROCEDURE — 1159F MED LIST DOCD IN RCRD: CPT | Mod: CPTII,S$GLB,, | Performed by: OTOLARYNGOLOGY

## 2022-04-18 PROCEDURE — 1159F PR MEDICATION LIST DOCUMENTED IN MEDICAL RECORD: ICD-10-PCS | Mod: CPTII,S$GLB,, | Performed by: OTOLARYNGOLOGY

## 2022-04-18 PROCEDURE — 3074F SYST BP LT 130 MM HG: CPT | Mod: CPTII,S$GLB,, | Performed by: OTOLARYNGOLOGY

## 2022-04-18 PROCEDURE — 99999 PR PBB SHADOW E&M-EST. PATIENT-LVL III: ICD-10-PCS | Mod: PBBFAC,,, | Performed by: OTOLARYNGOLOGY

## 2022-04-18 PROCEDURE — 3078F DIAST BP <80 MM HG: CPT | Mod: CPTII,S$GLB,, | Performed by: OTOLARYNGOLOGY

## 2022-04-18 PROCEDURE — 1157F PR ADVANCE CARE PLAN OR EQUIV PRESENT IN MEDICAL RECORD: ICD-10-PCS | Mod: CPTII,S$GLB,, | Performed by: OTOLARYNGOLOGY

## 2022-04-18 PROCEDURE — 69210 REMOVE IMPACTED EAR WAX UNI: CPT | Mod: S$GLB,,, | Performed by: OTOLARYNGOLOGY

## 2022-04-18 PROCEDURE — 1160F RVW MEDS BY RX/DR IN RCRD: CPT | Mod: CPTII,S$GLB,, | Performed by: OTOLARYNGOLOGY

## 2022-04-18 PROCEDURE — 99499 NO LOS: ICD-10-PCS | Mod: S$GLB,,, | Performed by: OTOLARYNGOLOGY

## 2022-04-18 PROCEDURE — 3288F PR FALLS RISK ASSESSMENT DOCUMENTED: ICD-10-PCS | Mod: CPTII,S$GLB,, | Performed by: OTOLARYNGOLOGY

## 2022-04-18 PROCEDURE — 1126F AMNT PAIN NOTED NONE PRSNT: CPT | Mod: CPTII,S$GLB,, | Performed by: OTOLARYNGOLOGY

## 2022-04-18 NOTE — PLAN OF CARE
[facility]  Occupational Therapy Initial Evaluation    Date: 4/13/2022  Name: Raffy Rutherford Jr.  Clinic Number: [id number]    Therapy Diagnosis:   Encounter Diagnoses   Name Primary?    Pain in thumb joint with movement of left hand     Decreased activities of daily living (ADL)      Physician: Kaye Solis MD    Physician Orders: eval and treat, fabrication of forearm based thumb splint to immobilize the thumb in slight abduction, the MP thumb at 0 and the wrist at 30 degrees ext, 3x weekly therapy    Medical Diagnosis: Laceration of extensor muscle and tendon of thumb at wrist and hand level [S66.229A]  Surgical Procedure and Date:   Evaluation Date: 04/13/2022  Insurance Authorization Period Expiration: 12/31/2022  Plan of Care Expiration: 06/10/2022  Date of Return to MD: 04/21/2022  Visit # / Visits authorized: 1 / 20  FOTO: initial eval     Precautions:  Standard and extensor tendon precautions    Time In: 2PM  Time Out: 3PM  Total Appointment Time (timed & untimed codes): 60 minutes      SUBJECTIVE     Date of Onset: 04/05/2022    History of Current Condition/Mechanism of Injury: Raffy reports: He cut his thumb while washing a wine glass. He feels the splint fits good except for some pressure over the ulnar head where his wife provided some light padding which helps.    Falls: none since injury    Involved Side: left  Dominant Side: Right  Imaging: see chart  Prior Therapy: none for this injury, prior OT for dupuytren's release, [facility]  Occupation:  Musician, CB therapist  Working presently: employed  Duties: playing instruments, jocelynn cello, handwriting    Functional Limitations/Social History:    Previous functional status includes: Independent with all ADLs.     Current Functional Status   Home/Living environment: lives with their spouse      Limitation of Functional Status as follows:   ADLs/IADLs:     - Feeding: uses right hand    - Bathing: uses right hand    -  Dressing/Grooming: uses right hand    - Driving: no difficulty      Leisure: playing music- unable to use left hand    Pain:  Functional Pain Scale Rating 0-10: Current 0/10, worst 5/10, best 0/10   Location: over MP joint left thumb  Description: Aching  Aggravating Factors: Night Time  Easing Factors: rest    Patient's Goals for Therapy: to play piano     Medical History:   Past Medical History:   Diagnosis Date    Allergy     Arthritis     Back pain     after trauma beginning in 195    Cataract     Chronic pain     neck and left shoulder    Cluster headache 2013    Colon polyps 2007 2007-2019: TA x5, HP x3    Degenerative disc disease     Depression     Diverticulitis 12/2013    Fibromyalgia 2013    GERD (gastroesophageal reflux disease)     Hepatitis 1970's    A    History of prostate biopsy 2002    Hyperlipidemia     Joint pain     Prostate cancer 07/2021    Sleep apnea     Thyroid nodule 7/16/2014    Tubular adenoma of colon 2007    5 removed [id number]    Visual disturbance 2012    problems after cataract surgery       Surgical History:    has a past surgical history that includes Knee surgery; Hemorrhoid surgery; Cholecystectomy; Sinus surgery; Eye surgery; Back surgery; Joint replacement; Total hip arthroplasty (4/2012); Cosmetic surgery (2/10/2015); Cosmetic surgery (2/10/2015); Cataract extraction w/  intraocular lens implant (Bilateral); Spinal fusion (06/22/2015); Injection of steroid (Right, 12/6/2018); Injection of steroid (Right, 9/19/2019); Esophagogastroduodenoscopy (N/A, 12/17/2019); Colonoscopy (N/A, 12/17/2019); Cystoscopy with ureteroscopy, retrograde pyelography, and insertion of stent (Left, 8/19/2020); Fusion of lumbar spine by anterior approach (N/A, 8/20/2020); Ulnar nerve transposition (Left, 12/16/2020); Carpal tunnel release (Right, 5/18/2021); Colonoscopy (2007); Repair of extensor tendon (Left, 4/7/2022); and Irrigation and debridement of upper extremity (Left,  4/7/2022).    Medications:   has a current medication list which includes the following prescription(s): clindamycin, clonazepam, clonazepam, hydrocodone-acetaminophen, hydrocodone-acetaminophen, lamotrigine, vuity, pravastatin, pregabalin, pregabalin, pregabalin, promethazine, rabeprazole, nurtec, nurtec, nurtec, tadalafil, and [DISCONTINUED] emsam, and the following Facility-Administered Medications: fentanyl, fentanyl, midazolam, and midazolam.    Allergies:   Review of patient's allergies indicates:   Allergen Reactions    Alphagan [brimonidine]      Patient taking MASO-B Selective Inhibitor Selegiline (Emsam)    Coumadin [warfarin]      itch    Oxycodone      hiccups          OBJECTIVE     Observation/Appearance: pt presents in custom orthotic, well healing incision seg[place]ia s/s of infection, sutures present        Edema. Measured in centimeters.   4/13/2022    Left     Thumb:    Prox. Phalanx 7.5   IP 8.0   Distal Phalanx 7.6     Elbow and Wrist ROM. Measured in degrees.   4/13/2022    Left           Wrist Ext/Flex NT   Wrist RD/UD NT     Hand ROM. Measured in degrees.   4/13/2022    Left  PROM extension  AROM flexion JAY   Thumb: MP 0/                IP 0+/       Rad ADD/ABD NT       Pal ADD/ABD NT          Opposition NT           Treatment   Total Treatment time (time-based codes) separate from Evaluation: 5 minutes    Raffy received the treatments listed below:       Therapeutic exercises to develop ROM and flexibility for 5 minutes, including:  -in clinic only: active opposition to IF x 20 reps with hand supinated, wrist extended and passive extension by therapist  - in orthosis: flexion to IP ~20degrees and passive extension to orthosis         Patient Education and Home Exercises      Education provided:   - cont HEP  Written Home Exercises Provided: Patient instructed to cont prior HEP.  Exercises were reviewed and Raffy was able to demonstrate them prior to the end of the session.  Raffy demonstrated  good  understanding of the education provided.     Pt was advised to perform these exercises free of pain, and to stop performing them if pain occurs.    Patient/Family Education: role of OT, goals for OT, scheduling/cancellations - pt verbalized understanding. Discussed insurance limitations with patient.    ASSESSMENT     Raffy Rutehrford Jr. is a 72 y.o. male referred to outpatient occupational therapy and presents with a medical diagnosis of s/p EPL and EPB ruture.  Patient presents with the following therapy deficits: Decreased ROM, Decreased  strength, Decreased pinch strength, Decreased muscle strength, Decreased functional hand use, Increased pain, Edema, Joint Stiffness, Scar Adhesions and Diminished/Impaired Coordination and demonstrates limitations as described in the chart below. Following medical record review it is determined that pt will benefit from occupational therapy services in order to maximize pain free and/or functional use of left hand. The following goals were discussed with the patient and patient is in agreement with them as to be addressed in the treatment plan. The patient's rehab potential is Good.     Anticipated barriers to occupational therapy: none  Pt has no cultural, educational or language barriers to learning provided.    Profile and History Assessment of Occupational Performance Level of Clinical Decision Making Complexity Score   Occupational Profile:   Raffy Rutherford Jr. is a 72 y.o. male who lives with their spouse and is currently employed Raffy Rutherford Jr. has difficulty with  ADLs and IADLs as listed previously, which  Affecting hisdaily functional abilities.      Comorbidities:    has a past medical history of Allergy, Arthritis, Back pain, Cataract, Chronic pain, Cluster headache, Colon polyps, Degenerative disc disease, Depression, Diverticulitis, Fibromyalgia, GERD (gastroesophageal reflux disease), Hepatitis, History of prostate biopsy, Hyperlipidemia, Joint pain,  Prostate cancer, Sleep apnea, Thyroid nodule, Tubular adenoma of colon, and Visual disturbance.    Medical and Therapy History Review:   Brief               Performance Deficits    Physical:  Joint Mobility  Muscle Power/Strength  Muscle Endurance  Skin Integrity/Scar Formation  Edema   Strength  Pinch Strength  Gross Motor Coordination  Fine Motor Coordination  Pain    Cognitive:  No Deficits    Psychosocial:    No Deficits     Clinical Decision Making:  low    Assessment Process:  Problem-Focused Assessments    Modification/Need for Assistance:  Not Necessary    Intervention Selection:  Limited Treatment Options       low  Based on PMHX, co morbidities , data from assessments and functional level of assistance required with task and clinical presentation directly impacting function.         Goals:   The following goals were discussed with the patient and patient is in agreement with them as to be addressed in the treatment plan.   Long Term Goals (LTGs); to be met by discharge.  LTG #1: Pt will report a pain level of 0 out of 10 with ADLs   LTG #2: Pt will demo improved FOTO score by 20 points.   LTG #3: Pt will return to prior level of function for ADLs and household management.     Short Term Goals (STGs); to be met within 4 weeks (05/13/2022).  STG #1: Pt will report 3 out of 10 pain level with ADLs.  STG #2: Pt will report/demo [place] with playing cello.  STG #3: Pt will report/demo [place] with tying shoes.  STG #4: Pt will demonstrate independence with issued HEP.   STG #5: Pt will demo improved Thumb opposition to SF in order to complete playing music.    PLAN   Plan of Care Certification: 4/13/2022 to 06/10/2022.     Outpatient Occupational Therapy 3 times weekly for 8 weeks to include the following interventions: Paraffin, Fluidotherapy, Manual therapy/joint mobilizations, Modalities for pain management, US 3 mhz, Therapeutic exercises/activities., Strengthening, Orthotic  Fabrication/Fit/Training, Edema Control, Scar Management and Joint Protection * strengthening and heat modalities when appropriate.      Jody Arnold, OTR/L,CHT      I CERTIFY THE NEED FOR THESE SERVICES FURNISHED UNDER THIS PLAN OF TREATMENT AND WHILE UNDER MY CARE  Physician's comments:      Physician's Signature: ___________________________________________________

## 2022-04-18 NOTE — PROGRESS NOTES
CC:Ear cleaning   HPI:Mr. Rutherford is a now 72 year old CM whose left hand / wrist is in a cast. He accidentally injured it cleaning a wine glass.  He presents today for ear cleaning procedures.  Audiometry was not scheduled for today.  He has a history of excessive cerumen reaccumulation affecting both ear canals requiring cleaning periodically.  His ears were last cleaned by me 12/23/2021 occlusive cerumen impactions removed from each ear canal then.  Is advised to return every 3 months for ear clean.  Previous audiometry had  indicated his right ear >> left high-frequency 3 K sensorineural hearing loss as well as his bilateral moderately severe 4-8 K sensorineural hearing  He has an otologic history of significant tinnitus (affecting his right ear). He has been evaluated by the [facility] audiology department for potential hearing aid use last year, although he decided not to purchase hearing aids during the visit of 07/19/2021.  He had completed a tinnitus handicap consultation previously.      He allows me to listen to some of his  music recorded on his cellphone.    His medication list has included Lamictal, Ritalin, Lyrica,  trazodone, and Cialis.    1. History of excessive cerumen     2. Impacted cerumen, bilateral     3. History of tinnitus     4. History of hearing loss     5.      Musician/bass player      PE: Gen.: alert and oriented articulate CM in no acute distress  Procedures:  Small volume of cerumen is removed from floor of the right ear canal the blunt curette.  Extraction causes a tiny bit of bleeding from the anterior floor of the right ear canal without gino scab formation.  Right TM is clear and intact is visualized the right middle ear space is well aerated.  Slightly larger volume of semi occlusive cerumen is extracted from left ear canal with a blunt curette.  Left TM is clear and intact left middle ear space is well aerated.      DIAGNOSIS:   1. History of excessive cerumen     2. Impacted  cerumen, bilateral     3. History of tinnitus     4. History of hearing loss         PLAN: Cerumen impactions removed from both eacs  Audiometry recommended on return

## 2022-04-19 NOTE — PROGRESS NOTES
OT Orthosis Only    TIME RECORD    Date:  4/11/2022    Start Time: 2:00pm  Stop Time:  3:00pm    PROCEDURES:      UNTIMED  Procedure Min.    -               Total Timed Minutes:  0  Total Timed Units:  0  Total Untimed Units:  1  Charges Billed/# of units:   [id number] 1    Occupational Therapy Orthotic Note    Patient: Raffy Rutherford Jr.  MRN: [id number]  Date of visit: 4/11/2022  Referring Physician:  Kaye Solis MD   Primary Diagnosis: S66.229A (ICD-10-CM) - Laceration of extensor muscle and tendon of   thumb at wrist and hand level  Treatment Diagnosis:   Encounter Diagnoses   Name Primary?    Laceration of extensor muscle and tendon of thumb at wrist and hand level     Pain in thumb joint with movement of left hand Yes    Decreased activities of daily living (ADL)      S/P R EPL Repair (zone 2)    Subjective:     Pt. Reported understanding of precautions  Objective:     Patient seen by OT this session for fabrication of orthosis per MD orders. Fabricated and applied Dynamic Extension Thumb Splint  Assessment:     Orthosis with good fit following fabrication. Pt. Able to monisha/doff independently.      Patient Education/Response:   Pt. Instructed to wear continuous, remove for bathing or hygiene. Patient/caregiver were provided written instructions on orthosis purpose, wear schedule, care and precautions to monitor for increased pain/edema, pressure points, skin breakdown or redness/skin irritation Patient/caregiver to contact clinic for adjustments as needed.  Patient signed copy of orthosis instructions, acknowledging delivery and understanding of wear, care, and precautions     (see Media for scanned documents)    Plans and Goals:     Goal of Orthosis to protect sx site/protect injury site. Pt. Scheduled for OT eval this week.

## 2022-04-20 ENCOUNTER — PATIENT MESSAGE (OUTPATIENT)
Dept: REHABILITATION | Facility: HOSPITAL | Age: 72
End: 2022-04-20
Payer: COMMERCIAL

## 2022-04-21 ENCOUNTER — OFFICE VISIT (OUTPATIENT)
Dept: ORTHOPEDICS | Facility: CLINIC | Age: 72
End: 2022-04-21
Payer: COMMERCIAL

## 2022-04-21 ENCOUNTER — OFFICE VISIT (OUTPATIENT)
Dept: PSYCHIATRY | Facility: CLINIC | Age: 72
End: 2022-04-21
Payer: COMMERCIAL

## 2022-04-21 VITALS — WEIGHT: 178.44 LBS | HEIGHT: 68 IN | BODY MASS INDEX: 27.04 KG/M2

## 2022-04-21 DIAGNOSIS — S56.429A EXTENSOR TENDON LACERATION OF FINGER WITH OPEN WOUND, INITIAL ENCOUNTER: Primary | ICD-10-CM

## 2022-04-21 DIAGNOSIS — S66.229A LACERATION OF EXTENSOR MUSCLE AND TENDON OF THUMB AT WRIST AND HAND LEVEL: ICD-10-CM

## 2022-04-21 DIAGNOSIS — S61.209A EXTENSOR TENDON LACERATION OF FINGER WITH OPEN WOUND, INITIAL ENCOUNTER: Primary | ICD-10-CM

## 2022-04-21 DIAGNOSIS — G89.4 CHRONIC PAIN SYNDROME: Primary | ICD-10-CM

## 2022-04-21 PROCEDURE — 3288F PR FALLS RISK ASSESSMENT DOCUMENTED: ICD-10-PCS | Mod: CPTII,S$GLB,, | Performed by: PHYSICIAN ASSISTANT

## 2022-04-21 PROCEDURE — 1101F PR PT FALLS ASSESS DOC 0-1 FALLS W/OUT INJ PAST YR: ICD-10-PCS | Mod: CPTII,S$GLB,, | Performed by: PHYSICIAN ASSISTANT

## 2022-04-21 PROCEDURE — 1101F PT FALLS ASSESS-DOCD LE1/YR: CPT | Mod: CPTII,S$GLB,, | Performed by: PHYSICIAN ASSISTANT

## 2022-04-21 PROCEDURE — 3008F PR BODY MASS INDEX (BMI) DOCUMENTED: ICD-10-PCS | Mod: CPTII,S$GLB,, | Performed by: PHYSICIAN ASSISTANT

## 2022-04-21 PROCEDURE — 1157F ADVNC CARE PLAN IN RCRD: CPT | Mod: CPTII,95,, | Performed by: PSYCHOLOGIST

## 2022-04-21 PROCEDURE — 1157F PR ADVANCE CARE PLAN OR EQUIV PRESENT IN MEDICAL RECORD: ICD-10-PCS | Mod: CPTII,95,, | Performed by: PSYCHOLOGIST

## 2022-04-21 PROCEDURE — 1157F ADVNC CARE PLAN IN RCRD: CPT | Mod: CPTII,S$GLB,, | Performed by: PHYSICIAN ASSISTANT

## 2022-04-21 PROCEDURE — 1126F PR PAIN SEVERITY QUANTIFIED, NO PAIN PRESENT: ICD-10-PCS | Mod: CPTII,S$GLB,, | Performed by: PHYSICIAN ASSISTANT

## 2022-04-21 PROCEDURE — 90834 PSYTX W PT 45 MINUTES: CPT | Mod: 95,,, | Performed by: PSYCHOLOGIST

## 2022-04-21 PROCEDURE — 99024 PR POST-OP FOLLOW-UP VISIT: ICD-10-PCS | Mod: S$GLB,,, | Performed by: PHYSICIAN ASSISTANT

## 2022-04-21 PROCEDURE — 99999 PR PBB SHADOW E&M-EST. PATIENT-LVL IV: CPT | Mod: PBBFAC,,, | Performed by: PHYSICIAN ASSISTANT

## 2022-04-21 PROCEDURE — 1160F RVW MEDS BY RX/DR IN RCRD: CPT | Mod: CPTII,S$GLB,, | Performed by: PHYSICIAN ASSISTANT

## 2022-04-21 PROCEDURE — 1160F PR REVIEW ALL MEDS BY PRESCRIBER/CLIN PHARMACIST DOCUMENTED: ICD-10-PCS | Mod: CPTII,S$GLB,, | Performed by: PHYSICIAN ASSISTANT

## 2022-04-21 PROCEDURE — 99024 POSTOP FOLLOW-UP VISIT: CPT | Mod: S$GLB,,, | Performed by: PHYSICIAN ASSISTANT

## 2022-04-21 PROCEDURE — 1126F AMNT PAIN NOTED NONE PRSNT: CPT | Mod: CPTII,S$GLB,, | Performed by: PHYSICIAN ASSISTANT

## 2022-04-21 PROCEDURE — 1157F PR ADVANCE CARE PLAN OR EQUIV PRESENT IN MEDICAL RECORD: ICD-10-PCS | Mod: CPTII,S$GLB,, | Performed by: PHYSICIAN ASSISTANT

## 2022-04-21 PROCEDURE — 1159F MED LIST DOCD IN RCRD: CPT | Mod: CPTII,S$GLB,, | Performed by: PHYSICIAN ASSISTANT

## 2022-04-21 PROCEDURE — 3008F BODY MASS INDEX DOCD: CPT | Mod: CPTII,S$GLB,, | Performed by: PHYSICIAN ASSISTANT

## 2022-04-21 PROCEDURE — 99999 PR PBB SHADOW E&M-EST. PATIENT-LVL IV: ICD-10-PCS | Mod: PBBFAC,,, | Performed by: PHYSICIAN ASSISTANT

## 2022-04-21 PROCEDURE — 3288F FALL RISK ASSESSMENT DOCD: CPT | Mod: CPTII,S$GLB,, | Performed by: PHYSICIAN ASSISTANT

## 2022-04-21 PROCEDURE — 1159F PR MEDICATION LIST DOCUMENTED IN MEDICAL RECORD: ICD-10-PCS | Mod: CPTII,S$GLB,, | Performed by: PHYSICIAN ASSISTANT

## 2022-04-21 PROCEDURE — 90834 PR PSYCHOTHERAPY W/PATIENT, 45 MIN: ICD-10-PCS | Mod: 95,,, | Performed by: PSYCHOLOGIST

## 2022-04-21 NOTE — PROGRESS NOTES
Individual Psychotherapy (PhD/LCSW)  04/21/2022    Site/Location:  Pt is at home ([place], [place]) attending a Telemedicine Individual Therapy Session. Due to technical issues, the some of pt's session was conducted via phone.     Visit Type: 45 min outpt individual psychotherapy    Therapeutic Intervention: Met with patient Outpatient - Interactive psychotherapy 45 min - CPT code 28999    Chief complaint/reason for encounter: Pain    Interval history and content of current session: Chronic pain hx: Pt has been struggling with chronic pain in his lower back and neuropathy which radiates from his right calf to his right foot. Pt has Tinnitus (ringing in his ears), migraine headaches currently 2x/month which decreases with the aid of medication. His pain ranges from 6/10 - 8/10. His pain worsens when driving, excessive sitting, standing, and stress. His pain decreases when moving positions, change in barometric pressure, Quell unit - a wireless TENS unit, and practicing mindfulness.     Pt was introduced to the Vicious Cycles of Pain theory for pain management. He learned how guarding behavior, disrupted sleep, stress and lack of movement can worsen or trigger pain. Pt learned the connection between depressed mood, anxiety and pain. He asked appropriate questions indicating an understanding of the material presented. Pt was receptive to recommendations such as the dehumidifier and resources on guided passive muscle relaxation. At pt's follow up session he will be introduced to [place] Control Theory for pain management. Therapist explained proper pacing to pt.     Treatment plan:  · Target symptoms: pain  · Why chosen therapy is appropriate versus another modality: Relevant to diagnosis  · Outcome monitoring methods: self-report  · Therapeutic intervention type: supportive psychotherapy    Risk parameters:  Patient reports no suicidal ideation  Patient reports no homicidal ideation  Patient reports no self-injurious  behavior  Patient reports no violent behavior    Verbal deficits: None    Patient's response to intervention:  The patient's response to intervention is accepting.    Progress toward goals and other mental status changes:  The patient's progress toward goals is good.    Diagnosis:   1) Persistent Depressive Disorder  2) Generalized Anxiety Disorder  3) Chronic Pain Syndrome      Plan:  individual psychotherapy    Return to clinic: 1-2 weeks    Length of Service (minutes): 45       Each patient to whom he or she provides medical services by telemedicine is: (1) informed of the relationship between the physician and patient and the respective role of any other health care provider with respect to management of the patient; and (2) notified that he or she may decline to receive medical services by telemedicine and may withdraw from such care at any time.

## 2022-04-21 NOTE — PROGRESS NOTES
" [facility]  Occupational Therapy Treatment Note    Date: 4/18/2022  Name: Raffy Rutherford Jr.  Clinic Number: [id number]    Therapy Diagnosis:   Encounter Diagnoses   Name Primary?    Pain in thumb joint with movement of left hand Yes    Decreased activities of daily living (ADL)      Physician: Kaye Solis MD    Physician Orders: eval and treat, fabrication of forearm based thumb splint to immobilize the thumb in slight abduction, the MP thumb at 0 and the wrist at 30 degrees ext, 3x weekly therapy     Medical Diagnosis: Laceration of extensor muscle and tendon of thumb at wrist and hand level [S66.229A]  Surgical Procedure and Date:   Evaluation Date: 04/13/2022  Insurance Authorization Period Expiration: 12/31/2022  Plan of Care Expiration: 06/10/2022  Date of Return to MD: 04/21/2022  Visit # / Visits authorized: 2 / 20  FOTO: initial eval      Precautions:  Standard and extensor tendon precautions     Time In: 905AM  Time Out: 945AM  Total Billable Time: 40 minutes      SUBJECTIVE     Pt reports: The splint bothers me a bit on the side of my wrist but otherwise I'm doing fine"  He was compliant with home exercise program given last session.   Response to previous treatment:none reported  Functional change: none reported    Pain: 3/10  Location: left thumb    OBJECTIVE   Objective Measures updated at progress report unless specified.    Edema. Measured in centimeters.    4/13/2022     Left      Thumb:     Prox. Phalanx 7.5   IP 8.0   Distal Phalanx 7.6      Elbow and Wrist ROM. Measured in degrees.    4/13/2022     Left               Wrist Ext/Flex NT   Wrist RD/UD NT      Hand ROM. Measured in degrees.    4/13/2022     Left  PROM extension  AROM flexion JAY   Thumb: MP 0/                IP 0+/       Rad ADD/ABD NT       Pal ADD/ABD NT          Opposition NT            Treatment     Raffy received the treatments listed below:     Supervised modalities after being cleared for " contradictions: Hot Pack - in protected extension x 10 min      Manual therapy techniques:were applied to the: for 5 minutes, including:  -retrograde massage      Therapeutic exercises to develop ROM and flexibility for 15 minutes, including:  -in clinic only: active opposition to IF x 20 reps with hand supinated, wrist extended and passive extension by therapist  - in orthosis: flexion to IP ~20degrees and passive extension to orthosis     5 min spent cleaning and dressing incision, adjusting orthotic for fit    Patient Education and Home Exercises      Education provided:   -   - Progress towards goals     Written Home Exercises Provided: Patient instructed to cont prior HEP.  Exercises were reviewed and Raffy was able to demonstrate them prior to the end of the session.  Raffy demonstrated good  understanding of the HEP provided. See EMR under Patient Instructions for exercises provided during therapy sessions.      ASSESSMENT     Pt would continue to benefit from skilled OT. Pt. Presents for first visit after initial evaluation. He is moving well. He has little pain. Incision is well healing.      Raffy is progressing well towards his goals and there are no updates to goals at this time. Pt prognosis is Good.     Pt will continue to benefit from skilled outpatient occupational therapy to address the deficits listed in the problem list on initial evaluation, provide pt/family education and to maximize pt's level of independence in the home and community environment.     Pt's spiritual, cultural and educational needs considered and pt agreeable to plan of care and goals.    Anticipated barriers to occupational therapy: none    Goals:  Goals:   The following goals were discussed with the patient and patient is in agreement with them as to be addressed in the treatment plan.   Long Term Goals (LTGs); to be met by discharge.  LTG #1: Pt will report a pain level of 0 out of 10 with ADLs - progressing    LTG #2: Pt  will demo improved FOTO score by 20 points. - progressing   LTG #3: Pt will return to prior level of function for ADLs and household management. - progressing      Short Term Goals (STGs); to be met within 4 weeks (05/13/2022).  STG #1: Pt will report 3 out of 10 pain level with ADLs.- progressing   STG #2: Pt will report/demo [place] with playing cello.- progressing   STG #3: Pt will report/demo [place] with tying shoes.- progressing   STG #4: Pt will demonstrate independence with issued HEP. - progressing   STG #5: Pt will demo improved Thumb opposition to SF in order to complete playing music- progressing     PLAN     Continue skilled occupational therapy with individualized plan of care focusing on adhering to extensor tendon protocol    Updates/Grading for next session: cont per protocol     Jody Arnold, OTR/L,CHT

## 2022-04-21 NOTE — PROGRESS NOTES
First Postoperative Visit    History of Present Illness:   Raffy Rutherford Jr. is s/p left EPL and EPB repair who comes to the office for 14 days post op evaluation (DOS 4/7/2022). He reports very little pain. He is here today in custom splint- fabricated by OT.  He has been in OT.  He reports some increased swelling in index finger. He has been compliant with full time splint wear.  He denies fevers or chills.       Physical Examination:  He is alert, awake and oriented to time, place and person. He does not appear to be in any distress, and denies any constitutional symptoms. Examination of the left thumb demonstrates healing incision with no erythema or swelling.  Sensation intact    Assessment/Plan:  1. 14 days s/p left EPL and EPB repair  2. Sutures were removed today, steri-strips applied  3. Continue full time splint wear, OT  4. Follow up 3 weeks with Dr. Solis    All questions were answered in detail. The patient is in full agreement with the treatment plan and will proceed accordingly.

## 2022-04-22 ENCOUNTER — CLINICAL SUPPORT (OUTPATIENT)
Dept: REHABILITATION | Facility: HOSPITAL | Age: 72
End: 2022-04-22
Payer: COMMERCIAL

## 2022-04-22 DIAGNOSIS — M25.542 PAIN IN THUMB JOINT WITH MOVEMENT OF LEFT HAND: Primary | ICD-10-CM

## 2022-04-22 DIAGNOSIS — Z78.9 DECREASED ACTIVITIES OF DAILY LIVING (ADL): ICD-10-CM

## 2022-04-22 PROCEDURE — 97140 MANUAL THERAPY 1/> REGIONS: CPT

## 2022-04-22 PROCEDURE — 97110 THERAPEUTIC EXERCISES: CPT

## 2022-04-22 NOTE — PROGRESS NOTES
[facility]  Occupational Therapy Treatment Note    Date: 4/22/2022  Name: Raffy Rutherford Jr.  Clinic Number: [id number]    Therapy Diagnosis:   Encounter Diagnoses   Name Primary?    Pain in thumb joint with movement of left hand Yes    Decreased activities of daily living (ADL)      Physician: Kaye Solis MD    Physician Orders: eval and treat, fabrication of forearm based thumb splint to immobilize the thumb in slight abduction, the MP thumb at 0 and the wrist at 30 degrees ext, 3x weekly therapy     Medical Diagnosis: Laceration of extensor muscle and tendon of thumb at wrist and hand level [S66.229A]  Surgical Procedure and Date: 04/07/2022: 1. Left extensor pollicis longus primary repair, zone T 3, cpt 46959  2. Left extensor pollicis brevis primary repair, zone T3, cpt 97952  3. Irrigation and excisional debridement left hand skin, subcutaneous tissue and tendon, cpt 14944  4. Complex wound exploration and closure, cpt 59076  Evaluation Date: 04/13/2022  Insurance Authorization Period Expiration: 12/31/2022  Plan of Care Expiration: 06/10/2022  Date of Return to MD: 04/21/2022  Visit # / Visits authorized: 2 / 20  FOTO: initial eval      Precautions:  Standard and extensor tendon precautions     Time In: 905AM  Time Out: 945AM  Total Billable Time: 40 minutes      SUBJECTIVE     Pt reports: I felt a little pain this am, nothing major, but that is the first time I had pain.   He was compliant with home exercise program given last session.   Response to previous treatment:none reported  Functional change: none reported    Pain: 3/10  Location: left thumb    OBJECTIVE   Objective Measures updated at progress report unless specified.    Edema. Measured in centimeters.    4/13/2022     Left      Thumb:     Prox. Phalanx 7.5   IP 8.0   Distal Phalanx 7.6      Elbow and Wrist ROM. Measured in degrees.    4/13/2022     Left               Wrist Ext/Flex NT   Wrist RD/UD NT      Hand  ROM. Measured in degrees.    4/13/2022     Left  PROM extension  AROM flexion JAY   Thumb: MP 0/                IP 0+/       Rad ADD/ABD NT       Pal ADD/ABD NT          Opposition NT            Treatment     Raffy received the treatments listed below:     Supervised modalities after being cleared for contradictions: Hot Pack - in protected extension x 10 min      Manual therapy techniques:were applied to the: for 8 minutes, including:  -retrograde massage  -astim to Left IF volar and palm  -scar massage to incision      Therapeutic exercises to develop ROM and flexibility for 15 minutes, including:  -in clinic only: active opposition to LF x 20 reps with hand supinated, wrist extended and passive extension by therapist  - in orthosis: flexion to IP ~40degrees and passive extension to orthosis     - cleansed incision/hand, dressed in coban just thumb, loosely, and applied new stockingette.     Patient Education and Home Exercises      Education provided:   -   - Progress towards goals     Written Home Exercises Provided: Patient instructed to cont prior HEP.  Exercises were reviewed and Raffy was able to demonstrate them prior to the end of the session.  Raffy demonstrated good  understanding of the HEP provided. See EMR under Patient Instructions for exercises provided during therapy sessions.      ASSESSMENT     Pt would continue to benefit from skilled OT. . He is moving well. He has little pain. Incision is well healing.  Increased AROM of IP to ~ 40 degrees flexion this date, passive extension. Increased in clinic exercises to LF without difficulty.     Raffy is progressing well towards his goals and there are no updates to goals at this time. Pt prognosis is Good.     Pt will continue to benefit from skilled outpatient occupational therapy to address the deficits listed in the problem list on initial evaluation, provide pt/family education and to maximize pt's level of independence in the home and  community environment.     Pt's spiritual, cultural and educational needs considered and pt agreeable to plan of care and goals.    Anticipated barriers to occupational therapy: none    Goals:  Goals:   The following goals were discussed with the patient and patient is in agreement with them as to be addressed in the treatment plan.   Long Term Goals (LTGs); to be met by discharge.  LTG #1: Pt will report a pain level of 0 out of 10 with ADLs - progressing    LTG #2: Pt will demo improved FOTO score by 20 points. - progressing   LTG #3: Pt will return to prior level of function for ADLs and household management. - progressing      Short Term Goals (STGs); to be met within 4 weeks (05/13/2022).  STG #1: Pt will report 3 out of 10 pain level with ADLs.- progressing   STG #2: Pt will report/demo [place] with playing cello.- progressing   STG #3: Pt will report/demo [place] with tying shoes.- progressing   STG #4: Pt will demonstrate independence with issued HEP. - progressing   STG #5: Pt will demo improved Thumb opposition to SF in order to complete playing music- progressing     PLAN     Continue skilled occupational therapy with individualized plan of care focusing on adhering to extensor tendon protocol    Updates/Grading for next session: cont per protocol     Jody Arnold, OTR/L,CHT

## 2022-04-25 ENCOUNTER — CLINICAL SUPPORT (OUTPATIENT)
Dept: REHABILITATION | Facility: HOSPITAL | Age: 72
End: 2022-04-25
Payer: COMMERCIAL

## 2022-04-25 DIAGNOSIS — Z78.9 DECREASED ACTIVITIES OF DAILY LIVING (ADL): ICD-10-CM

## 2022-04-25 DIAGNOSIS — M25.542 PAIN IN THUMB JOINT WITH MOVEMENT OF LEFT HAND: Primary | ICD-10-CM

## 2022-04-25 PROCEDURE — 97140 MANUAL THERAPY 1/> REGIONS: CPT

## 2022-04-25 PROCEDURE — 97110 THERAPEUTIC EXERCISES: CPT

## 2022-04-25 NOTE — PROGRESS NOTES
[facility]  Occupational Therapy Treatment Note    Date: 4/25/2022  Name: Raffy Rutherford Jr.  Clinic Number: [id number]    Therapy Diagnosis:   Encounter Diagnoses   Name Primary?    Pain in thumb joint with movement of left hand Yes    Decreased activities of daily living (ADL)      Physician: Kaye Solis MD    Physician Orders: eval and treat, fabrication of forearm based thumb splint to immobilize the thumb in slight abduction, the MP thumb at 0 and the wrist at 30 degrees ext, 3x weekly therapy     Medical Diagnosis: Laceration of extensor muscle and tendon of thumb at wrist and hand level [S66.229A]  Surgical Procedure and Date: 04/07/2022: 1. Left extensor pollicis longus primary repair, zone T 3, cpt 72858  2. Left extensor pollicis brevis primary repair, zone T3, cpt 14485  3. Irrigation and excisional debridement left hand skin, subcutaneous tissue and tendon, cpt 39610  4. Complex wound exploration and closure, cpt 18816  Evaluation Date: 04/13/2022  Insurance Authorization Period Expiration: 12/31/2022  Plan of Care Expiration: 06/10/2022  Date of Return to MD: 04/21/2022  Visit # / Visits authorized: 3 / 20  FOTO: initial eval      Precautions:  Standard and extensor tendon precautions     Time In: 10AM  Time Out: 1040AM  Total Billable Time: 40 minutes      SUBJECTIVE     Pt reports: It feels a little better   He was compliant with home exercise program given last session.   Response to previous treatment:none reported  Functional change: none reported    Pain: 3/10  Location: left thumb    OBJECTIVE   Objective Measures updated at progress report unless specified.    Edema. Measured in centimeters.    4/13/2022     Left      Thumb:     Prox. Phalanx 7.5   IP 8.0   Distal Phalanx 7.6      Elbow and Wrist ROM. Measured in degrees.    4/13/2022     Left               Wrist Ext/Flex NT   Wrist RD/UD NT      Hand ROM. Measured in degrees.    4/13/2022     Left  PROM  extension  AROM flexion JAY   Thumb: MP 0/                IP 0+/       Rad ADD/ABD NT       Pal ADD/ABD NT          Opposition NT            Treatment     Raffy received the treatments listed below:     Supervised modalities after being cleared for contradictions: Hot Pack - in protected extension x 10 min      Manual therapy techniques:were applied to the: for 8 minutes, including:  -retrograde massage  -astim to Left IF volar and palm  -scar massage to incision      Therapeutic exercises to develop ROM and flexibility for 15 minutes, including:  -in clinic only: active opposition to LF x 20 reps with hand supinated, wrist extended and passive extension by therapist  - in orthosis: flexion to IP ~40degrees and passive extension to orthosis     - cleansed incision/hand, dressed in coban just thumb, loosely, and applied new stockingette.     Patient Education and Home Exercises      Education provided:   -   - Progress towards goals     Written Home Exercises Provided: Patient instructed to cont prior HEP.  Exercises were reviewed and Raffy was able to demonstrate them prior to the end of the session.  Raffy demonstrated good  understanding of the HEP provided. See EMR under Patient Instructions for exercises provided during therapy sessions.      ASSESSMENT     Pt would continue to benefit from skilled OT. . He is moving well. He has little pain. Incision is well healing. Continued with increased motion this date per protocol. Steri-strips removed, incision well healing, Scar massage lightly started around healed areas only.     Raffy is progressing well towards his goals and there are no updates to goals at this time. Pt prognosis is Good.     Pt will continue to benefit from skilled outpatient occupational therapy to address the deficits listed in the problem list on initial evaluation, provide pt/family education and to maximize pt's level of independence in the home and community environment.     Pt's  spiritual, cultural and educational needs considered and pt agreeable to plan of care and goals.    Anticipated barriers to occupational therapy: none    Goals:  Goals:   The following goals were discussed with the patient and patient is in agreement with them as to be addressed in the treatment plan.   Long Term Goals (LTGs); to be met by discharge.  LTG #1: Pt will report a pain level of 0 out of 10 with ADLs - progressing    LTG #2: Pt will demo improved FOTO score by 20 points. - progressing   LTG #3: Pt will return to prior level of function for ADLs and household management. - progressing      Short Term Goals (STGs); to be met within 4 weeks (05/13/2022).  STG #1: Pt will report 3 out of 10 pain level with ADLs.- progressing   STG #2: Pt will report/demo [place] with playing cello.- progressing   STG #3: Pt will report/demo [place] with tying shoes.- progressing   STG #4: Pt will demonstrate independence with issued HEP. - progressing   STG #5: Pt will demo improved Thumb opposition to SF in order to complete playing music- progressing     PLAN     Continue skilled occupational therapy with individualized plan of care focusing on adhering to extensor tendon protocol    Updates/Grading for next session: cont per protocol     Jody Arnold, OTR/L,CHT

## 2022-04-27 ENCOUNTER — CLINICAL SUPPORT (OUTPATIENT)
Dept: REHABILITATION | Facility: HOSPITAL | Age: 72
End: 2022-04-27
Payer: COMMERCIAL

## 2022-04-27 DIAGNOSIS — Z78.9 DECREASED ACTIVITIES OF DAILY LIVING (ADL): ICD-10-CM

## 2022-04-27 DIAGNOSIS — M25.542 PAIN IN THUMB JOINT WITH MOVEMENT OF LEFT HAND: Primary | ICD-10-CM

## 2022-04-27 PROCEDURE — 97110 THERAPEUTIC EXERCISES: CPT

## 2022-04-27 PROCEDURE — 97140 MANUAL THERAPY 1/> REGIONS: CPT

## 2022-04-28 NOTE — PROGRESS NOTES
[facility]  Occupational Therapy Treatment Note    Date: 4/27/2022  Name: Raffy Rutherford Jr.  Clinic Number: [id number]    Therapy Diagnosis:   Encounter Diagnoses   Name Primary?    Pain in thumb joint with movement of left hand Yes    Decreased activities of daily living (ADL)      Physician: Kaye Solis MD    Physician Orders: eval and treat, fabrication of forearm based thumb splint to immobilize the thumb in slight abduction, the MP thumb at 0 and the wrist at 30 degrees ext, 3x weekly therapy     Medical Diagnosis: Laceration of extensor muscle and tendon of thumb at wrist and hand level [S66.229A]  Surgical Procedure and Date: 04/07/2022: 1. Left extensor pollicis longus primary repair, zone T 3, cpt 51392  2. Left extensor pollicis brevis primary repair, zone T3, cpt 98826  3. Irrigation and excisional debridement left hand skin, subcutaneous tissue and tendon, cpt 01232  4. Complex wound exploration and closure, cpt 77130  Evaluation Date: 04/13/2022  Insurance Authorization Period Expiration: 12/31/2022  Plan of Care Expiration: 06/10/2022  Date of Return to MD: 04/21/2022  Visit # / Visits authorized: 5 / 20  FOTO: initial eval      Precautions:  Standard and extensor tendon precautions     Time In: 10AM  Time Out: 1040AM  Total Billable Time: 40 minutes      SUBJECTIVE     Pt reports: I'm having numbness in the thumb that is new  He was compliant with home exercise program given last session.   Response to previous treatment:none reported  Functional change: none reported    Pain: 3/10  Location: left thumb    OBJECTIVE   Objective Measures updated at progress report unless specified.    Edema. Measured in centimeters.    4/13/2022     Left      Thumb:     Prox. Phalanx 7.5   IP 8.0   Distal Phalanx 7.6      Elbow and Wrist ROM. Measured in degrees.    4/13/2022     Left               Wrist Ext/Flex NT   Wrist RD/UD NT      Hand ROM. Measured in degrees.     4/13/2022     Left  PROM extension  AROM flexion JAY   Thumb: MP 0/                IP 0+/       Rad ADD/ABD NT       Pal ADD/ABD NT          Opposition NT            Treatment     Raffy received the treatments listed below:     Supervised modalities after being cleared for contradictions: Hot Pack - in protected extension x 10 min      Manual therapy techniques:were applied to the: for 8 minutes, including:  -retrograde massage  -astim to Left IF volar and palm  -scar massage to incision      Therapeutic exercises to develop ROM and flexibility for 15 minutes, including:  -in clinic only: active opposition to LF x 20 reps with hand supinated, wrist extended and passive extension by therapist  - in orthosis: flexion to IP ~40degrees and passive extension to orthosis     - cleansed incision/hand, dressed in coban just thumb, loosely, and applied new stockingette.     Patient Education and Home Exercises      Education provided:   -   - Progress towards goals     Written Home Exercises Provided: Patient instructed to cont prior HEP.  Exercises were reviewed and Raffy was able to demonstrate them prior to the end of the session.  Raffy demonstrated good  understanding of the HEP provided. See EMR under Patient Instructions for exercises provided during therapy sessions.      ASSESSMENT     Pt would continue to benefit from skilled OT. . He is moving well. He has little pain. Incision is well healing. Continued with increased motion this date per protocol. Slight redness noted about scar this date. Suture appears to be working itself out. Cleansed with sterile water and sent photos to MD via pts chart. Pt.'s wife ( who is a wound care NP) may apply topical microbial per MD verbal at home. Pt. With numbness this date that is new. Will continue to monitor.      Raffy is progressing well towards his goals and there are no updates to goals at this time. Pt prognosis is Good.     Pt will continue to benefit from skilled  outpatient occupational therapy to address the deficits listed in the problem list on initial evaluation, provide pt/family education and to maximize pt's level of independence in the home and community environment.     Pt's spiritual, cultural and educational needs considered and pt agreeable to plan of care and goals.    Anticipated barriers to occupational therapy: none    Goals:  Goals:   The following goals were discussed with the patient and patient is in agreement with them as to be addressed in the treatment plan.   Long Term Goals (LTGs); to be met by discharge.  LTG #1: Pt will report a pain level of 0 out of 10 with ADLs - progressing    LTG #2: Pt will demo improved FOTO score by 20 points. - progressing   LTG #3: Pt will return to prior level of function for ADLs and household management. - progressing      Short Term Goals (STGs); to be met within 4 weeks (05/13/2022).  STG #1: Pt will report 3 out of 10 pain level with ADLs.- progressing   STG #2: Pt will report/demo [place] with playing cello.- progressing   STG #3: Pt will report/demo [place] with tying shoes.- progressing   STG #4: Pt will demonstrate independence with issued HEP. - progressing   STG #5: Pt will demo improved Thumb opposition to SF in order to complete playing music- progressing     PLAN     Continue skilled occupational therapy with individualized plan of care focusing on adhering to extensor tendon protocol    Updates/Grading for next session: cont per protocol     Jody Arnold, OTR/L,CHT

## 2022-04-29 ENCOUNTER — PATIENT MESSAGE (OUTPATIENT)
Dept: PSYCHIATRY | Facility: CLINIC | Age: 72
End: 2022-04-29
Payer: COMMERCIAL

## 2022-04-29 ENCOUNTER — CLINICAL SUPPORT (OUTPATIENT)
Dept: REHABILITATION | Facility: HOSPITAL | Age: 72
End: 2022-04-29
Payer: COMMERCIAL

## 2022-04-29 ENCOUNTER — PATIENT MESSAGE (OUTPATIENT)
Dept: REHABILITATION | Facility: HOSPITAL | Age: 72
End: 2022-04-29

## 2022-04-29 DIAGNOSIS — C61 PROSTATE CANCER: Primary | ICD-10-CM

## 2022-04-29 DIAGNOSIS — Z78.9 DECREASED ACTIVITIES OF DAILY LIVING (ADL): ICD-10-CM

## 2022-04-29 DIAGNOSIS — M25.542 PAIN IN THUMB JOINT WITH MOVEMENT OF LEFT HAND: Primary | ICD-10-CM

## 2022-04-29 PROCEDURE — 97110 THERAPEUTIC EXERCISES: CPT

## 2022-04-29 PROCEDURE — 97140 MANUAL THERAPY 1/> REGIONS: CPT

## 2022-04-29 RX ORDER — SULFAMETHOXAZOLE AND TRIMETHOPRIM 800; 160 MG/1; MG/1
1 TABLET ORAL 2 TIMES DAILY
Qty: 14 TABLET | Refills: 0 | Status: SHIPPED | OUTPATIENT
Start: 2022-04-29 | End: 2022-05-06

## 2022-04-29 NOTE — PROGRESS NOTES
[facility]  Occupational Therapy Treatment Note    Date: 4/29/2022  Name: Raffy Rutherford Jr.  Clinic Number: [id number]    Therapy Diagnosis:   Encounter Diagnoses   Name Primary?    Pain in thumb joint with movement of left hand Yes    Decreased activities of daily living (ADL)      Physician: Kaye Solis MD    Physician Orders: eval and treat, fabrication of forearm based thumb splint to immobilize the thumb in slight abduction, the MP thumb at 0 and the wrist at 30 degrees ext, 3x weekly therapy     Medical Diagnosis: Laceration of extensor muscle and tendon of thumb at wrist and hand level [S66.229A]  Surgical Procedure and Date: 04/07/2022: 1. Left extensor pollicis longus primary repair, zone T 3, cpt 81909  2. Left extensor pollicis brevis primary repair, zone T3, cpt 33460  3. Irrigation and excisional debridement left hand skin, subcutaneous tissue and tendon, cpt 83816  4. Complex wound exploration and closure, cpt 65494  Evaluation Date: 04/13/2022  Insurance Authorization Period Expiration: 12/31/2022  Plan of Care Expiration: 06/10/2022  Date of Return to MD: 04/21/2022  Visit # / Visits authorized: 6 / 20  FOTO: initial eval      Precautions:  Standard and extensor tendon precautions     Time In: 10AM  Time Out: 1040AM  Total Billable Time: 40 minutes      SUBJECTIVE     Pt reports: I'm having numbness in the thumb that is new  He was compliant with home exercise program given last session.   Response to previous treatment:none reported  Functional change: none reported    Pain: 3/10  Location: left thumb    OBJECTIVE   Objective Measures updated at progress report unless specified.    Edema. Measured in centimeters.    4/13/2022     Left      Thumb:     Prox. Phalanx 7.5   IP 8.0   Distal Phalanx 7.6      Elbow and Wrist ROM. Measured in degrees.    4/13/2022     Left               Wrist Ext/Flex NT   Wrist RD/UD NT      Hand ROM. Measured in degrees.     4/13/2022     Left  PROM extension  AROM flexion JAY   Thumb: MP 0/                IP 0+/       Rad ADD/ABD NT       Pal ADD/ABD NT          Opposition NT            Treatment     Raffy received the treatments listed below:     Supervised modalities after being cleared for contradictions: Hot Pack - in protected extension x 10 min      Manual therapy techniques:were applied to the: for 8 minutes, including:  -retrograde massage  -astim to Left IF volar and palm  -scar massage to incision      Therapeutic exercises to develop ROM and flexibility for 15 minutes, including:  -in clinic only: active opposition to LF x 20 reps with hand supinated, wrist extended and passive extension by therapist  - in orthosis: flexion to IP ~40degrees and passive extension to orthosis     - cleansed incision/hand, dressed in coban just thumb, loosely, and applied new stockingette.     Patient Education and Home Exercises      Education provided:   -   - Progress towards goals     Written Home Exercises Provided: Patient instructed to cont prior HEP.  Exercises were reviewed and Raffy was able to demonstrate them prior to the end of the session.  Raffy demonstrated good  understanding of the HEP provided. See EMR under Patient Instructions for exercises provided during therapy sessions.      ASSESSMENT     Pt would continue to benefit from skilled OT. . He is moving well. He has little pain. Incision a little red this date- messaged MD who wanted to start on oral antibiotics.. Continued with increased motion this date per protocol. With numbness this date that is new. Will continue to monitor.      Raffy is progressing well towards his goals and there are no updates to goals at this time. Pt prognosis is Good.     Pt will continue to benefit from skilled outpatient occupational therapy to address the deficits listed in the problem list on initial evaluation, provide pt/family education and to maximize pt's level of independence in  the home and community environment.     Pt's spiritual, cultural and educational needs considered and pt agreeable to plan of care and goals.    Anticipated barriers to occupational therapy: none    Goals:  Goals:   The following goals were discussed with the patient and patient is in agreement with them as to be addressed in the treatment plan.   Long Term Goals (LTGs); to be met by discharge.  LTG #1: Pt will report a pain level of 0 out of 10 with ADLs - progressing    LTG #2: Pt will demo improved FOTO score by 20 points. - progressing   LTG #3: Pt will return to prior level of function for ADLs and household management. - progressing      Short Term Goals (STGs); to be met within 4 weeks (05/13/2022).  STG #1: Pt will report 3 out of 10 pain level with ADLs.- progressing   STG #2: Pt will report/demo [place] with playing cello.- progressing   STG #3: Pt will report/demo [place] with tying shoes.- progressing   STG #4: Pt will demonstrate independence with issued HEP. - progressing   STG #5: Pt will demo improved Thumb opposition to SF in order to complete playing music- progressing     PLAN     Continue skilled occupational therapy with individualized plan of care focusing on adhering to extensor tendon protocol    Updates/Grading for next session: cont per protocol     Jody Arnold OTR/L,CHT

## 2022-05-02 ENCOUNTER — CLINICAL SUPPORT (OUTPATIENT)
Dept: REHABILITATION | Facility: HOSPITAL | Age: 72
End: 2022-05-02
Payer: COMMERCIAL

## 2022-05-02 ENCOUNTER — TELEPHONE (OUTPATIENT)
Dept: PSYCHIATRY | Facility: CLINIC | Age: 72
End: 2022-05-02
Payer: COMMERCIAL

## 2022-05-02 DIAGNOSIS — Z78.9 DECREASED ACTIVITIES OF DAILY LIVING (ADL): ICD-10-CM

## 2022-05-02 DIAGNOSIS — M25.542 PAIN IN THUMB JOINT WITH MOVEMENT OF LEFT HAND: Primary | ICD-10-CM

## 2022-05-02 PROCEDURE — 97110 THERAPEUTIC EXERCISES: CPT

## 2022-05-02 PROCEDURE — 97140 MANUAL THERAPY 1/> REGIONS: CPT

## 2022-05-02 NOTE — TELEPHONE ENCOUNTER
Called pt regarding below message. Left voicemail with return number. Patient immediately called back and scheduled 2 appointments with Dr. Sanchez, virtual 5/16/22 @ 11:45 and 5/30/22 @11:45

## 2022-05-02 NOTE — TELEPHONE ENCOUNTER
----- Message from Bibiana Sanchez PsyD sent at 4/21/2022 10:13 AM CDT -----  Virtual first available. Please schedule 2 sessions.

## 2022-05-02 NOTE — PROGRESS NOTES
[facility]  Occupational Therapy Treatment Note    Date: 5/2/2022  Name: Raffy Rutherford Jr.  Clinic Number: [id number]    Therapy Diagnosis:   Encounter Diagnoses   Name Primary?    Pain in thumb joint with movement of left hand Yes    Decreased activities of daily living (ADL)      Physician: Kaye Solis MD    Physician Orders: eval and treat, fabrication of forearm based thumb splint to immobilize the thumb in slight abduction, the MP thumb at 0 and the wrist at 30 degrees ext, 3x weekly therapy     Medical Diagnosis: Laceration of extensor muscle and tendon of thumb at wrist and hand level [S66.229A]  Surgical Procedure and Date: 04/07/2022: 1. Left extensor pollicis longus primary repair, zone T 3, cpt 67728  2. Left extensor pollicis brevis primary repair, zone T3, cpt 41522  3. Irrigation and excisional debridement left hand skin, subcutaneous tissue and tendon, cpt 60221  4. Complex wound exploration and closure, cpt 20383  Evaluation Date: 04/13/2022  Insurance Authorization Period Expiration: 12/31/2022  Plan of Care Expiration: 06/10/2022  Date of Return to MD: 04/21/2022  Visit # / Visits authorized: 7 / 20  FOTO: initial eval      Precautions:  Standard and extensor tendon precautions     Time In: 10AM  Time Out: 1040AM  Total Billable Time: 40 minutes      SUBJECTIVE     Pt reports: Less numbness at the thumb  He was compliant with home exercise program given last session.   Response to previous treatment:none reported  Functional change: none reported    Pain: 3/10  Location: left thumb    OBJECTIVE   Objective Measures updated at progress report unless specified.    Edema. Measured in centimeters.    4/13/2022     Left      Thumb:     Prox. Phalanx 7.5   IP 8.0   Distal Phalanx 7.6      Elbow and Wrist ROM. Measured in degrees.    4/13/2022     Left               Wrist Ext/Flex NT   Wrist RD/UD NT      Hand ROM. Measured in degrees.    4/13/2022     Left  PROM  extension  AROM flexion JAY   Thumb: MP 0/                IP 0+/       Rad ADD/ABD NT       Pal ADD/ABD NT          Opposition NT            Treatment     Raffy received the treatments listed below:     Supervised modalities after being cleared for contradictions: Hot Pack - in protected extension x 10 min      Manual therapy techniques:were applied to the: for 8 minutes, including:  -retrograde massage  -astim to Left IF volar and palm  -scar massage to incision      Therapeutic exercises to develop ROM and flexibility for 15 minutes, including:  -in clinic only: active opposition to IF, LF, RF x 20 reps with hand supinated, wrist extended and passive extension by therapist  -place and hold EPL x 5 - 10 reps  - in orthosis: flexion to IP ~40degrees and passive extension to orthosis     - cleansed incision/hand, dressed in coban just thumb, loosely, and applied new stockingette.     Patient Education and Home Exercises      Education provided:   -   - Progress towards goals     Written Home Exercises Provided: Patient instructed to cont prior HEP.  Exercises were reviewed and Raffy was able to demonstrate them prior to the end of the session.  Raffy demonstrated good  understanding of the HEP provided. See EMR under Patient Instructions for exercises provided during therapy sessions.      ASSESSMENT     Pt would continue to benefit from skilled OT. . He is moving well. He has little pain. Incision is well healing. Continued with increased motion this date per protocol. Less redness at incision site noted. Less swelling. Good ability to place and hold this date.     Raffy is progressing well towards his goals and there are no updates to goals at this time. Pt prognosis is Good.     Pt will continue to benefit from skilled outpatient occupational therapy to address the deficits listed in the problem list on initial evaluation, provide pt/family education and to maximize pt's level of independence in the home and  community environment.     Pt's spiritual, cultural and educational needs considered and pt agreeable to plan of care and goals.    Anticipated barriers to occupational therapy: none    Goals:  Goals:   The following goals were discussed with the patient and patient is in agreement with them as to be addressed in the treatment plan.   Long Term Goals (LTGs); to be met by discharge.  LTG #1: Pt will report a pain level of 0 out of 10 with ADLs - progressing    LTG #2: Pt will demo improved FOTO score by 20 points. - progressing   LTG #3: Pt will return to prior level of function for ADLs and household management. - progressing      Short Term Goals (STGs); to be met within 4 weeks (05/13/2022).  STG #1: Pt will report 3 out of 10 pain level with ADLs.- progressing   STG #2: Pt will report/demo [place] with playing cello.- progressing   STG #3: Pt will report/demo [place] with tying shoes.- progressing   STG #4: Pt will demonstrate independence with issued HEP. - progressing   STG #5: Pt will demo improved Thumb opposition to SF in order to complete playing music- progressing     PLAN     Continue skilled occupational therapy with individualized plan of care focusing on adhering to extensor tendon protocol    Updates/Grading for next session: cont per protocol , start full AROM at week 4 5/5/2022    SHANTI Magana/JACK,CHT

## 2022-05-03 ENCOUNTER — CLINICAL SUPPORT (OUTPATIENT)
Dept: REHABILITATION | Facility: HOSPITAL | Age: 72
End: 2022-05-03
Payer: COMMERCIAL

## 2022-05-03 ENCOUNTER — LAB VISIT (OUTPATIENT)
Dept: LAB | Facility: HOSPITAL | Age: 72
End: 2022-05-03
Attending: RADIOLOGY
Payer: COMMERCIAL

## 2022-05-03 DIAGNOSIS — C61 PROSTATE CANCER: ICD-10-CM

## 2022-05-03 DIAGNOSIS — M25.542 PAIN IN THUMB JOINT WITH MOVEMENT OF LEFT HAND: Primary | ICD-10-CM

## 2022-05-03 DIAGNOSIS — Z78.9 DECREASED ACTIVITIES OF DAILY LIVING (ADL): ICD-10-CM

## 2022-05-03 PROCEDURE — 36415 COLL VENOUS BLD VENIPUNCTURE: CPT | Performed by: RADIOLOGY

## 2022-05-03 PROCEDURE — 97140 MANUAL THERAPY 1/> REGIONS: CPT

## 2022-05-03 PROCEDURE — 84153 ASSAY OF PSA TOTAL: CPT | Performed by: RADIOLOGY

## 2022-05-03 PROCEDURE — 97110 THERAPEUTIC EXERCISES: CPT

## 2022-05-04 LAB — COMPLEXED PSA SERPL-MCNC: 8.9 NG/ML (ref 0–4)

## 2022-05-06 ENCOUNTER — PATIENT MESSAGE (OUTPATIENT)
Dept: REHABILITATION | Facility: HOSPITAL | Age: 72
End: 2022-05-06

## 2022-05-06 ENCOUNTER — CLINICAL SUPPORT (OUTPATIENT)
Dept: REHABILITATION | Facility: HOSPITAL | Age: 72
End: 2022-05-06
Payer: COMMERCIAL

## 2022-05-06 ENCOUNTER — PATIENT MESSAGE (OUTPATIENT)
Dept: RADIATION ONCOLOGY | Facility: CLINIC | Age: 72
End: 2022-05-06
Payer: COMMERCIAL

## 2022-05-06 DIAGNOSIS — M25.542 PAIN IN THUMB JOINT WITH MOVEMENT OF LEFT HAND: Primary | ICD-10-CM

## 2022-05-06 DIAGNOSIS — Z78.9 DECREASED ACTIVITIES OF DAILY LIVING (ADL): ICD-10-CM

## 2022-05-06 PROCEDURE — 97140 MANUAL THERAPY 1/> REGIONS: CPT

## 2022-05-06 PROCEDURE — 97110 THERAPEUTIC EXERCISES: CPT

## 2022-05-06 PROCEDURE — 97022 WHIRLPOOL THERAPY: CPT

## 2022-05-06 NOTE — PROGRESS NOTES
[facility]  Occupational Therapy Treatment Note    Date: 5/6/2022  Name: Raffy Rutherford Jr.  Clinic Number: [id number]    Therapy Diagnosis:   Encounter Diagnoses   Name Primary?    Pain in thumb joint with movement of left hand Yes    Decreased activities of daily living (ADL)      Physician: Kaye Solis MD    Physician Orders: eval and treat, fabrication of forearm based thumb splint to immobilize the thumb in slight abduction, the MP thumb at 0 and the wrist at 30 degrees ext, 3x weekly therapy     Medical Diagnosis: Laceration of extensor muscle and tendon of thumb at wrist and hand level [S66.229A]  Surgical Procedure and Date: 04/07/2022: 1. Left extensor pollicis longus primary repair, zone T 3, cpt 69028  2. Left extensor pollicis brevis primary repair, zone T3, cpt 28739  3. Irrigation and excisional debridement left hand skin, subcutaneous tissue and tendon, cpt 50839  4. Complex wound exploration and closure, cpt 18783  Evaluation Date: 04/13/2022  Insurance Authorization Period Expiration: 12/31/2022  Plan of Care Expiration: 06/10/2022  Date of Return to MD: 04/21/2022  Visit # / Visits authorized: 9 / 20  FOTO: initial eval      Precautions:  Standard and extensor tendon precautions     Time In: 10AM  Time Out: 1050AM  Total Billable Time: 50minutes      SUBJECTIVE     Pt reports: Less numbness at the thumb  He was compliant with home exercise program given last session.   Response to previous treatment:none reported  Functional change: none reported    Pain: 3/10  Location: left thumb    OBJECTIVE   Objective Measures updated at progress report unless specified.    Edema. Measured in centimeters.    4/13/2022     Left      Thumb:     Prox. Phalanx 7.5   IP 8.0   Distal Phalanx 7.6      Elbow and Wrist ROM. Measured in degrees.    4/13/2022     Left               Wrist Ext/Flex NT   Wrist RD/UD NT      Hand ROM. Measured in degrees.    4/13/2022     Left  PROM  extension  AROM flexion JAY   Thumb: MP 0/                IP 0+/       Rad ADD/ABD NT       Pal ADD/ABD NT          Opposition NT            Treatment     Raffy received the treatments listed below:     Supervised modalities after being cleared for contradictions: Patient received paraffin bath to left hand(s) for 10 minutes to increase blood flow, circulation, pain management and for tissue elasticity prior to therex.         Manual therapy techniques:were applied to the: for 8 minutes, including:  -retrograde massage  -astim to Left IF volar and palm  -scar massage to incision      Therapeutic exercises to develop ROM and flexibility for 25 minutes, including:  - wrist flx/ext, RD/UD x 10 reps  -thumb.AROM gentle with wrist extension : x 10 reps each   -thumb palmar and radial abduction              - IP flex/ext    -opposition             - thumb flex composite             - thumb lifts            -   -    - cleansed incision/hand, dressed in coban just thumb, loosely, and applied new stockingette.     Patient Education and Home Exercises      Education provided:   -   - Progress towards goals     Written Home Exercises Provided: yes.  Exercises were reviewed and Raffy was able to demonstrate them prior to the end of the session.  Raffy demonstrated good  understanding of the HEP provided. See EMR under Patient Instructions for exercises provided during therapy sessions.      ASSESSMENT     Pt would continue to benefit from skilled OT. . He is moving well. He has little pain. Incision is well healing. Continued with increased motion this date per protocol. Less redness at incision site noted. Less swelling. Good ability to place and hold this date. AROM started this date.     Raffy is progressing well towards his goals and there are no updates to goals at this time. Pt prognosis is Good.     Pt will continue to benefit from skilled outpatient occupational therapy to address the deficits listed in the problem  list on initial evaluation, provide pt/family education and to maximize pt's level of independence in the home and community environment.     Pt's spiritual, cultural and educational needs considered and pt agreeable to plan of care and goals.    Anticipated barriers to occupational therapy: none    Goals:  Goals:   The following goals were discussed with the patient and patient is in agreement with them as to be addressed in the treatment plan.   Long Term Goals (LTGs); to be met by discharge.  LTG #1: Pt will report a pain level of 0 out of 10 with ADLs - progressing    LTG #2: Pt will demo improved FOTO score by 20 points. - progressing   LTG #3: Pt will return to prior level of function for ADLs and household management. - progressing      Short Term Goals (STGs); to be met within 4 weeks (05/13/2022).  STG #1: Pt will report 3 out of 10 pain level with ADLs.- progressing   STG #2: Pt will report/demo [place] with playing cello.- progressing   STG #3: Pt will report/demo [place] with tying shoes.- progressing   STG #4: Pt will demonstrate independence with issued HEP. - progressing   STG #5: Pt will demo improved Thumb opposition to SF in order to complete playing music- progressing     PLAN     Continue skilled occupational therapy with individualized plan of care focusing on adhering to extensor tendon protocol    Updates/Grading for next session: cont per protocol ,     Jody Arnold, OTR/L,CHT

## 2022-05-09 ENCOUNTER — OFFICE VISIT (OUTPATIENT)
Dept: ORTHOPEDICS | Facility: CLINIC | Age: 72
End: 2022-05-09
Payer: COMMERCIAL

## 2022-05-09 ENCOUNTER — CLINICAL SUPPORT (OUTPATIENT)
Dept: REHABILITATION | Facility: HOSPITAL | Age: 72
End: 2022-05-09
Payer: COMMERCIAL

## 2022-05-09 VITALS — BODY MASS INDEX: 26.52 KG/M2 | WEIGHT: 175 LBS | HEIGHT: 68 IN

## 2022-05-09 DIAGNOSIS — M25.542 PAIN IN THUMB JOINT WITH MOVEMENT OF LEFT HAND: Primary | ICD-10-CM

## 2022-05-09 DIAGNOSIS — S56.429D EXTENSOR TENDON LACERATION OF FINGER WITH OPEN WOUND, SUBSEQUENT ENCOUNTER: Primary | ICD-10-CM

## 2022-05-09 DIAGNOSIS — S61.209D EXTENSOR TENDON LACERATION OF FINGER WITH OPEN WOUND, SUBSEQUENT ENCOUNTER: Primary | ICD-10-CM

## 2022-05-09 DIAGNOSIS — Z78.9 DECREASED ACTIVITIES OF DAILY LIVING (ADL): ICD-10-CM

## 2022-05-09 PROCEDURE — 1157F PR ADVANCE CARE PLAN OR EQUIV PRESENT IN MEDICAL RECORD: ICD-10-PCS | Mod: CPTII,S$GLB,, | Performed by: ORTHOPAEDIC SURGERY

## 2022-05-09 PROCEDURE — 99999 PR PBB SHADOW E&M-EST. PATIENT-LVL III: ICD-10-PCS | Mod: PBBFAC,,, | Performed by: ORTHOPAEDIC SURGERY

## 2022-05-09 PROCEDURE — 97022 WHIRLPOOL THERAPY: CPT

## 2022-05-09 PROCEDURE — 1157F ADVNC CARE PLAN IN RCRD: CPT | Mod: CPTII,S$GLB,, | Performed by: ORTHOPAEDIC SURGERY

## 2022-05-09 PROCEDURE — 99999 PR PBB SHADOW E&M-EST. PATIENT-LVL III: CPT | Mod: PBBFAC,,, | Performed by: ORTHOPAEDIC SURGERY

## 2022-05-09 PROCEDURE — 97110 THERAPEUTIC EXERCISES: CPT

## 2022-05-09 PROCEDURE — 99024 PR POST-OP FOLLOW-UP VISIT: ICD-10-PCS | Mod: S$GLB,,, | Performed by: ORTHOPAEDIC SURGERY

## 2022-05-09 PROCEDURE — 1125F PR PAIN SEVERITY QUANTIFIED, PAIN PRESENT: ICD-10-PCS | Mod: CPTII,S$GLB,, | Performed by: ORTHOPAEDIC SURGERY

## 2022-05-09 PROCEDURE — 3008F BODY MASS INDEX DOCD: CPT | Mod: CPTII,S$GLB,, | Performed by: ORTHOPAEDIC SURGERY

## 2022-05-09 PROCEDURE — 1159F PR MEDICATION LIST DOCUMENTED IN MEDICAL RECORD: ICD-10-PCS | Mod: CPTII,S$GLB,, | Performed by: ORTHOPAEDIC SURGERY

## 2022-05-09 PROCEDURE — 1160F RVW MEDS BY RX/DR IN RCRD: CPT | Mod: CPTII,S$GLB,, | Performed by: ORTHOPAEDIC SURGERY

## 2022-05-09 PROCEDURE — 3008F PR BODY MASS INDEX (BMI) DOCUMENTED: ICD-10-PCS | Mod: CPTII,S$GLB,, | Performed by: ORTHOPAEDIC SURGERY

## 2022-05-09 PROCEDURE — 1160F PR REVIEW ALL MEDS BY PRESCRIBER/CLIN PHARMACIST DOCUMENTED: ICD-10-PCS | Mod: CPTII,S$GLB,, | Performed by: ORTHOPAEDIC SURGERY

## 2022-05-09 PROCEDURE — 1125F AMNT PAIN NOTED PAIN PRSNT: CPT | Mod: CPTII,S$GLB,, | Performed by: ORTHOPAEDIC SURGERY

## 2022-05-09 PROCEDURE — 97140 MANUAL THERAPY 1/> REGIONS: CPT

## 2022-05-09 PROCEDURE — 99024 POSTOP FOLLOW-UP VISIT: CPT | Mod: S$GLB,,, | Performed by: ORTHOPAEDIC SURGERY

## 2022-05-09 PROCEDURE — 1159F MED LIST DOCD IN RCRD: CPT | Mod: CPTII,S$GLB,, | Performed by: ORTHOPAEDIC SURGERY

## 2022-05-09 NOTE — PROGRESS NOTES
[facility]  Occupational Therapy Treatment Note    Date: 5/9/2022  Name: Raffy Rutherford Jr.  Clinic Number: [id number]    Therapy Diagnosis:   Encounter Diagnoses   Name Primary?    Pain in thumb joint with movement of left hand Yes    Decreased activities of daily living (ADL)      Physician: Kaye Solis MD    Physician Orders: eval and treat, fabrication of forearm based thumb splint to immobilize the thumb in slight abduction, the MP thumb at 0 and the wrist at 30 degrees ext, 3x weekly therapy     Medical Diagnosis: Laceration of extensor muscle and tendon of thumb at wrist and hand level [S66.229A]  Surgical Procedure and Date: 04/07/2022: 1. Left extensor pollicis longus primary repair, zone T 3, cpt 39045  2. Left extensor pollicis brevis primary repair, zone T3, cpt 62095  3. Irrigation and excisional debridement left hand skin, subcutaneous tissue and tendon, cpt 42769  4. Complex wound exploration and closure, cpt 01560  Evaluation Date: 04/13/2022  Insurance Authorization Period Expiration: 12/31/2022  Plan of Care Expiration: 06/10/2022  Date of Return to MD: 04/21/2022  Visit # / Visits authorized: 10 / 20  FOTO: initial eval      Precautions:  Standard and extensor tendon precautions     Time In: 10AM  Time Out: 1050AM  Total Billable Time: 50minutes      SUBJECTIVE     Pt reports: feeling ok  He was compliant with home exercise program given last session.   Response to previous treatment:none reported  Functional change: none reported    Pain: 3/10  Location: left thumb    OBJECTIVE   Objective Measures updated at progress report unless specified.    Edema. Measured in centimeters.    4/13/2022     Left      Thumb:     Prox. Phalanx 7.5   IP 8.0   Distal Phalanx 7.6      Elbow and Wrist ROM. Measured in degrees.    4/13/2022     Left               Wrist Ext/Flex NT   Wrist RD/UD NT      Hand ROM. Measured in degrees.    4/13/2022 05/09/2022     Left  PROM  extension  AROM flexion JAY Left AROM   Thumb: MP 0/ 0/40                IP 0+/ 0+/30       Rad ADD/ABD NT 40       Pal ADD/ABD NT 50          Opposition NT Tip of SF           Treatment     Raffy received the treatments listed below:     Supervised modalities after being cleared for contradictions: Patient received paraffin bath to left hand(s) for 10 minutes to increase blood flow, circulation, pain management and for tissue elasticity prior to therex.         Manual therapy techniques:were applied to the: for 8 minutes, including:  -retrograde massage  -astim to Left IF volar and palm  -scar massage to incision      Therapeutic exercises to develop ROM and flexibility for 25 minutes, including:  - wrist flx/ext, RD/UD x 10 reps  -thumb.AROM: x 10 reps each   -thumb palmar and radial abduction              - IP flex/ext    -opposition             - thumb flex composite             - thumb lifts            -   - isospheres x 3 min  -  Thumb spool wheel x 1' CW, x 1CCW  -      Patient Education and Home Exercises      Education provided:   -   - Progress towards goals     Written Home Exercises Provided: Patient instructed to cont prior HEP.  Exercises were reviewed and Raffy was able to demonstrate them prior to the end of the session.  Raffy demonstrated good  understanding of the HEP provided. See EMR under Patient Instructions for exercises provided during therapy sessions.      ASSESSMENT     Pt would continue to benefit from skilled OT. . He is moving well. He has little pain. He has been doing his new exercises. AROM with good objective measurements per above.   Raffy is progressing well towards his goals and there are no updates to goals at this time. Pt prognosis is Good.     Pt will continue to benefit from skilled outpatient occupational therapy to address the deficits listed in the problem list on initial evaluation, provide pt/family education and to maximize pt's level of independence in the home  and community environment.     Pt's spiritual, cultural and educational needs considered and pt agreeable to plan of care and goals.    Anticipated barriers to occupational therapy: none    Goals:  Goals:   The following goals were discussed with the patient and patient is in agreement with them as to be addressed in the treatment plan.   Long Term Goals (LTGs); to be met by discharge.  LTG #1: Pt will report a pain level of 0 out of 10 with ADLs - progressing    LTG #2: Pt will demo improved FOTO score by 20 points. - progressing   LTG #3: Pt will return to prior level of function for ADLs and household management. - progressing      Short Term Goals (STGs); to be met within 4 weeks (05/13/2022).  STG #1: Pt will report 3 out of 10 pain level with ADLs.- progressing   STG #2: Pt will report/demo [place] with playing cello.- progressing   STG #3: Pt will report/demo [place] with tying shoes.- progressing   STG #4: Pt will demonstrate independence with issued HEP. - progressing   STG #5: Pt will demo improved Thumb opposition to SF in order to complete playing music- progressing     PLAN     Continue skilled occupational therapy with individualized plan of care focusing on adhering to extensor tendon protocol    Updates/Grading for next session: cont per protocol ,     Jody Arnold, OTR/L,CHT

## 2022-05-09 NOTE — PROGRESS NOTES
[facility]  Occupational Therapy Treatment Note    Date: 5/3/2022  Name: Raffy Rutherford Jr.  Clinic Number: [id number]    Therapy Diagnosis:   Encounter Diagnoses   Name Primary?    Pain in thumb joint with movement of left hand Yes    Decreased activities of daily living (ADL)      Physician: Kaye Solis MD    Physician Orders: eval and treat, fabrication of forearm based thumb splint to immobilize the thumb in slight abduction, the MP thumb at 0 and the wrist at 30 degrees ext, 3x weekly therapy     Medical Diagnosis: Laceration of extensor muscle and tendon of thumb at wrist and hand level [S66.229A]  Surgical Procedure and Date: 04/07/2022: 1. Left extensor pollicis longus primary repair, zone T 3, cpt 95633  2. Left extensor pollicis brevis primary repair, zone T3, cpt 21447  3. Irrigation and excisional debridement left hand skin, subcutaneous tissue and tendon, cpt 66650  4. Complex wound exploration and closure, cpt 51932  Evaluation Date: 04/13/2022  Insurance Authorization Period Expiration: 12/31/2022  Plan of Care Expiration: 06/10/2022  Date of Return to MD: 04/21/2022  Visit # / Visits authorized: 8 / 20  FOTO: initial eval      Precautions:  Standard and extensor tendon precautions     Time In: 10AM  Time Out: 1040AM  Total Billable Time: 40 minutes      SUBJECTIVE     Pt reports: Less numbness at the thumb  He was compliant with home exercise program given last session.   Response to previous treatment:none reported  Functional change: none reported    Pain: 3/10  Location: left thumb    OBJECTIVE   Objective Measures updated at progress report unless specified.    Edema. Measured in centimeters.    4/13/2022     Left      Thumb:     Prox. Phalanx 7.5   IP 8.0   Distal Phalanx 7.6      Elbow and Wrist ROM. Measured in degrees.    4/13/2022     Left               Wrist Ext/Flex NT   Wrist RD/UD NT      Hand ROM. Measured in degrees.    4/13/2022     Left  PROM  extension  AROM flexion JAY   Thumb: MP 0/                IP 0+/       Rad ADD/ABD NT       Pal ADD/ABD NT          Opposition NT            Treatment     Raffy received the treatments listed below:     Supervised modalities after being cleared for contradictions: Hot Pack - in protected extension x 10 min      Manual therapy techniques:were applied to the: for 8 minutes, including:  -retrograde massage  -astim to Left IF volar and palm  -scar massage to incision      Therapeutic exercises to develop ROM and flexibility for 15 minutes, including:  -in clinic only: active opposition to IF, LF, RF x 20 reps with hand supinated, wrist extended and passive extension by therapist  -place and hold EPL x 5 - 10 reps  - in orthosis: flexion to IP ~40degrees and passive extension to orthosis     - cleansed incision/hand, dressed in coban just thumb, loosely, and applied new stockingette.     Patient Education and Home Exercises      Education provided:   -   - Progress towards goals     Written Home Exercises Provided: Patient instructed to cont prior HEP.  Exercises were reviewed and Raffy was able to demonstrate them prior to the end of the session.  Raffy demonstrated good  understanding of the HEP provided. See EMR under Patient Instructions for exercises provided during therapy sessions.      ASSESSMENT     Pt would continue to benefit from skilled OT. . He is moving well. He has little pain. Incision is well healing. Continued with increased motion this date per protocol. Less redness at incision site noted. Less swelling. Good ability to place and hold this date.     Raffy is progressing well towards his goals and there are no updates to goals at this time. Pt prognosis is Good.     Pt will continue to benefit from skilled outpatient occupational therapy to address the deficits listed in the problem list on initial evaluation, provide pt/family education and to maximize pt's level of independence in the home and  community environment.     Pt's spiritual, cultural and educational needs considered and pt agreeable to plan of care and goals.    Anticipated barriers to occupational therapy: none    Goals:  Goals:   The following goals were discussed with the patient and patient is in agreement with them as to be addressed in the treatment plan.   Long Term Goals (LTGs); to be met by discharge.  LTG #1: Pt will report a pain level of 0 out of 10 with ADLs - progressing    LTG #2: Pt will demo improved FOTO score by 20 points. - progressing   LTG #3: Pt will return to prior level of function for ADLs and household management. - progressing      Short Term Goals (STGs); to be met within 4 weeks (05/13/2022).  STG #1: Pt will report 3 out of 10 pain level with ADLs.- progressing   STG #2: Pt will report/demo [place] with playing cello.- progressing   STG #3: Pt will report/demo [place] with tying shoes.- progressing   STG #4: Pt will demonstrate independence with issued HEP. - progressing   STG #5: Pt will demo improved Thumb opposition to SF in order to complete playing music- progressing     PLAN     Continue skilled occupational therapy with individualized plan of care focusing on adhering to extensor tendon protocol    Updates/Grading for next session: cont per protocol , start full AROM at week 4 5/5/2022    SHANTI Magana/JACK,CHT

## 2022-05-11 ENCOUNTER — PATIENT MESSAGE (OUTPATIENT)
Dept: REHABILITATION | Facility: HOSPITAL | Age: 72
End: 2022-05-11
Payer: COMMERCIAL

## 2022-05-12 NOTE — PROGRESS NOTES
Raffy Rutherford  presents for post-operative evaluation of   Encounter Diagnosis   Name Primary?    Extensor tendon laceration of finger with open wound, subsequent encounter Yes   The patient is now 4 weeks s/p left thumb EPL and EPB repairs.  Overall the patient reports doing well.  The patient reports appropriate postoperative soreness with well controlled overall pain.  He has been wearing his splint and working in therapy.    PE:    AA&O x 4.  NAD  HEENT:  NCAT, sclera nonicteric  Lungs:  Respirations are equal and unlabored.  CV:  2+ bilateral upper and lower extremity pulses.  MSK: The incision is well healed.  Very good thumb EPL and EPB tendon excursion. Full wrist and finger motion.  Neurovascularly intact and has 5/5 thenar and intrinsic musculature strength.        A/P: Status post above, doing well  1) Continue with range of motion and strengthening at 6 weeks  2) F/U 6 weeks  3) Call with any questions/concerns in the interim        Kaye Solis MD     Please be aware that this note has been generated with the assistance of MModal voice-to-text.  Please excuse any spelling or grammatical errors.

## 2022-05-13 DIAGNOSIS — N40.1 BPH WITH URINARY OBSTRUCTION: ICD-10-CM

## 2022-05-13 DIAGNOSIS — N13.8 BPH WITH URINARY OBSTRUCTION: ICD-10-CM

## 2022-05-13 DIAGNOSIS — N52.01 ERECTILE DYSFUNCTION DUE TO ARTERIAL INSUFFICIENCY: ICD-10-CM

## 2022-05-13 DIAGNOSIS — R39.15 URINARY URGENCY: ICD-10-CM

## 2022-05-13 RX ORDER — TADALAFIL 5 MG/1
5 TABLET ORAL DAILY PRN
Qty: 120 TABLET | Refills: 3 | Status: SHIPPED | OUTPATIENT
Start: 2022-05-13 | End: 2023-06-05 | Stop reason: DRUGHIGH

## 2022-05-15 ENCOUNTER — PATIENT MESSAGE (OUTPATIENT)
Dept: PSYCHIATRY | Facility: CLINIC | Age: 72
End: 2022-05-15
Payer: COMMERCIAL

## 2022-05-17 ENCOUNTER — CLINICAL SUPPORT (OUTPATIENT)
Dept: REHABILITATION | Facility: HOSPITAL | Age: 72
End: 2022-05-17
Payer: COMMERCIAL

## 2022-05-17 ENCOUNTER — OFFICE VISIT (OUTPATIENT)
Dept: PSYCHIATRY | Facility: CLINIC | Age: 72
End: 2022-05-17
Payer: COMMERCIAL

## 2022-05-17 DIAGNOSIS — F34.1 PERSISTENT DEPRESSIVE DISORDER: Primary | ICD-10-CM

## 2022-05-17 DIAGNOSIS — F41.1 GAD (GENERALIZED ANXIETY DISORDER): ICD-10-CM

## 2022-05-17 DIAGNOSIS — G89.4 CHRONIC PAIN SYNDROME: ICD-10-CM

## 2022-05-17 DIAGNOSIS — M25.542 PAIN IN THUMB JOINT WITH MOVEMENT OF LEFT HAND: Primary | ICD-10-CM

## 2022-05-17 DIAGNOSIS — Z78.9 DECREASED ACTIVITIES OF DAILY LIVING (ADL): ICD-10-CM

## 2022-05-17 PROCEDURE — 1157F PR ADVANCE CARE PLAN OR EQUIV PRESENT IN MEDICAL RECORD: ICD-10-PCS | Mod: CPTII,95,, | Performed by: PSYCHOLOGIST

## 2022-05-17 PROCEDURE — 97022 WHIRLPOOL THERAPY: CPT

## 2022-05-17 PROCEDURE — 97110 THERAPEUTIC EXERCISES: CPT

## 2022-05-17 PROCEDURE — 90834 PR PSYCHOTHERAPY W/PATIENT, 45 MIN: ICD-10-PCS | Mod: 95,,, | Performed by: PSYCHOLOGIST

## 2022-05-17 PROCEDURE — 90834 PSYTX W PT 45 MINUTES: CPT | Mod: 95,,, | Performed by: PSYCHOLOGIST

## 2022-05-17 PROCEDURE — 1157F ADVNC CARE PLAN IN RCRD: CPT | Mod: CPTII,95,, | Performed by: PSYCHOLOGIST

## 2022-05-17 PROCEDURE — 97140 MANUAL THERAPY 1/> REGIONS: CPT

## 2022-05-17 NOTE — PROGRESS NOTES
The patient location is: home in [place], [place]   The chief complaint leading to consultation is: depressed mood; self-esteem     Visit type: audiovisual (part of session done audio only due to technological problems)     Face to Face time with patient: 45  60 minutes of total time spent on the encounter, which includes face to face time and non-face to face time preparing to see the patient (eg, review of tests), Obtaining and/or reviewing separately obtained history, Documenting clinical information in the electronic or other health record, Independently interpreting results (not separately reported) and communicating results to the patient/family/caregiver, or Care coordination (not separately reported).         Each patient to whom he or she provides medical services by telemedicine is:  (1) informed of the relationship between the physician and patient and the respective role of any other health care provider with respect to management of the patient; and (2) notified that he or she may decline to receive medical services by telemedicine and may withdraw from such care at any time.    Notes:       Individual Psychotherapy (PhD/LCSW)    5/17/2022    Site:  [facility]         Therapeutic Intervention: Met with patient.  Outpatient - Insight oriented psychotherapy 45 min - CPT code 14364    Chief complaint/reason for encounter: adjustment; depressed mood      Interval history and content of current session:  Pt reports that he is doing better despite continued having to contend with the injury to his hand that has required OT (3x's per week and PT (3x's per week).  He has not been able to play his cello and f/u on ideas of inviting others to play with him. 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 He notes that there are things that he is looking forward to including a  10-day trip to [place] with his wife where he hopes to be able to do some hiking. 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 We agreed to meet one more time as scheduled and decide then if he wants to stop regular therapy sessions and leave future appointments on an as needed basis.     Treatment plan:  · Target symptoms: adjustment  · Why chosen therapy is appropriate versus another modality: relevant to diagnosis, patient responds to this modality  · Outcome monitoring methods: self-report, observation  · Therapeutic intervention type: insight oriented psychotherapy    Risk parameters:  Patient reports no suicidal ideation  Patient reports no homicidal ideation  Patient reports no self-injurious behavior  Patient reports no violent behavior    Verbal deficits: None    Patient's response to intervention:  The patient's response to intervention is accepting.    Progress toward goals and other mental status changes:  The patient's progress toward goals is fair .    Diagnosis: 1) Persistent Depressive Disorder  2) Generalized Anxiety Disorder  3) Chronic Pain Syndrome       Plan:  individual psychotherapy    Return to clinic: as scheduled    Length of Service (minutes): 45

## 2022-05-17 NOTE — PROGRESS NOTES
[facility]  Occupational Therapy Treatment Note    Date: 5/17/2022  Name: Raffy Rutherford Jr.  Clinic Number: [id number]    Therapy Diagnosis:   Encounter Diagnoses   Name Primary?    Pain in thumb joint with movement of left hand Yes    Decreased activities of daily living (ADL)      Physician: Kaye Solis MD    Physician Orders: eval and treat, fabrication of forearm based thumb splint to immobilize the thumb in slight abduction, the MP thumb at 0 and the wrist at 30 degrees ext, 3x weekly therapy     Medical Diagnosis: Laceration of extensor muscle and tendon of thumb at wrist and hand level [S66.229A]  Surgical Procedure and Date: 04/07/2022: 1. Left extensor pollicis longus primary repair, zone T 3, cpt 54442  2. Left extensor pollicis brevis primary repair, zone T3, cpt 77917  3. Irrigation and excisional debridement left hand skin, subcutaneous tissue and tendon, cpt 66527  4. Complex wound exploration and closure, cpt 94670  Evaluation Date: 04/13/2022  Insurance Authorization Period Expiration: 12/31/2022  Plan of Care Expiration: 06/10/2022  Date of Return to MD: 04/21/2022  Visit # / Visits authorized: 11 / 20  FOTO: initial eval      Precautions:  Standard and extensor tendon precautions     Time In: 330pm  Time Out: 320pm  Total Billable Time: 50minutes      SUBJECTIVE     Pt reports: its feeling ok, the doctor said it looks good  He was compliant with home exercise program given last session.   Response to previous treatment:none reported  Functional change: none reported    Pain: 3/10  Location: left thumb    OBJECTIVE   Objective Measures updated at progress report unless specified.    Edema. Measured in centimeters.    4/13/2022     Left      Thumb:     Prox. Phalanx 7.5   IP 8.0   Distal Phalanx 7.6      Elbow and Wrist ROM. Measured in degrees.    4/13/2022     Left               Wrist Ext/Flex NT   Wrist RD/UD NT      Hand ROM. Measured in degrees.     4/13/2022 05/09/2022 5/17/2022     Left  PROM extension  AROM flexion JAY Left AROM Left AROM post heat   Thumb: MP 0/ 0/40 0/43                IP 0+/ 0+/30 0/40       Rad ADD/ABD NT 40 40       Pal ADD/ABD NT 50 48          Opposition NT Tip of SF Tip of SF           Treatment     Raffy received the treatments listed below:     Supervised modalities after being cleared for contradictions: Fluidotherapy: To left hand  for 10 min, continuous air, 115 deg, air speed 50 to decrease pain, edema & scar tissue, sensory re- education, and increased tissue extensibility prior to therex        Manual therapy techniques:were applied to the: for 8 minutes, including:  -retrograde massage  -astim to Left IF volar and palm  -scar massage to incision      Therapeutic exercises to develop ROM and flexibility for 25 minutes, including:  - wrist flx/ext, RD/UD x 10 reps  -thumb.AROM: x 10 reps each   -thumb palmar and radial abduction              - IP flex/ext    -opposition             - thumb flex composite             - thumb lifts            -   - isospheres x 3 min  -  Thumb spool wheel x 1' CW, x 1CCW  - yellow CP x 3 pt pinch x lateral pinch x 2 containers each  - nuts and bolts x 1 container   -       Patient Education and Home Exercises      Education provided:   -   - Progress towards goals     Written Home Exercises Provided: Patient instructed to cont prior HEP.  Exercises were reviewed and Raffy was able to demonstrate them prior to the end of the session.  Raffy demonstrated good  understanding of the HEP provided. See EMR under Patient Instructions for exercises provided during therapy sessions.      ASSESSMENT     Pt would continue to benefit from skilled OT. . He is moving well. He has little pain. He is eager to get back to playing cello. Pt. Able to start 10 min practice session, ease into it.  AROM with good objective measurements per above.   Raffy is progressing well towards his goals and there are no  updates to goals at this time. Pt prognosis is Good.     Pt will continue to benefit from skilled outpatient occupational therapy to address the deficits listed in the problem list on initial evaluation, provide pt/family education and to maximize pt's level of independence in the home and community environment.     Pt's spiritual, cultural and educational needs considered and pt agreeable to plan of care and goals.    Anticipated barriers to occupational therapy: none    Goals:  Goals:   The following goals were discussed with the patient and patient is in agreement with them as to be addressed in the treatment plan.   Long Term Goals (LTGs); to be met by discharge.  LTG #1: Pt will report a pain level of 0 out of 10 with ADLs - progressing    LTG #2: Pt will demo improved FOTO score by 20 points. - progressing   LTG #3: Pt will return to prior level of function for ADLs and household management. - progressing      Short Term Goals (STGs); to be met within 4 weeks (05/13/2022).  STG #1: Pt will report 3 out of 10 pain level with ADLs.- progressing   STG #2: Pt will report/demo [place] with playing cello.- progressing   STG #3: Pt will report/demo [place] with tying shoes.- progressing   STG #4: Pt will demonstrate independence with issued HEP. - progressing   STG #5: Pt will demo improved Thumb opposition to SF in order to complete playing music- progressing     PLAN     Continue skilled occupational therapy with individualized plan of care focusing on adhering to extensor tendon protocol    Updates/Grading for next session: cont per protocol ,     Jody Arnold, OTR/L,CHT

## 2022-05-18 ENCOUNTER — CLINICAL SUPPORT (OUTPATIENT)
Dept: REHABILITATION | Facility: HOSPITAL | Age: 72
End: 2022-05-18
Payer: COMMERCIAL

## 2022-05-18 DIAGNOSIS — M25.542 PAIN IN THUMB JOINT WITH MOVEMENT OF LEFT HAND: Primary | ICD-10-CM

## 2022-05-18 DIAGNOSIS — Z78.9 DECREASED ACTIVITIES OF DAILY LIVING (ADL): ICD-10-CM

## 2022-05-18 PROCEDURE — 97110 THERAPEUTIC EXERCISES: CPT

## 2022-05-18 PROCEDURE — 97140 MANUAL THERAPY 1/> REGIONS: CPT

## 2022-05-18 PROCEDURE — 97022 WHIRLPOOL THERAPY: CPT

## 2022-05-20 ENCOUNTER — CLINICAL SUPPORT (OUTPATIENT)
Dept: REHABILITATION | Facility: HOSPITAL | Age: 72
End: 2022-05-20
Payer: COMMERCIAL

## 2022-05-20 DIAGNOSIS — M25.542 PAIN IN THUMB JOINT WITH MOVEMENT OF LEFT HAND: Primary | ICD-10-CM

## 2022-05-20 DIAGNOSIS — Z78.9 DECREASED ACTIVITIES OF DAILY LIVING (ADL): ICD-10-CM

## 2022-05-20 PROCEDURE — 97140 MANUAL THERAPY 1/> REGIONS: CPT

## 2022-05-20 PROCEDURE — 97022 WHIRLPOOL THERAPY: CPT

## 2022-05-20 PROCEDURE — 97110 THERAPEUTIC EXERCISES: CPT

## 2022-05-23 ENCOUNTER — CLINICAL SUPPORT (OUTPATIENT)
Dept: REHABILITATION | Facility: HOSPITAL | Age: 72
End: 2022-05-23
Payer: COMMERCIAL

## 2022-05-23 DIAGNOSIS — M25.542 PAIN IN THUMB JOINT WITH MOVEMENT OF LEFT HAND: Primary | ICD-10-CM

## 2022-05-23 DIAGNOSIS — Z78.9 DECREASED ACTIVITIES OF DAILY LIVING (ADL): ICD-10-CM

## 2022-05-23 PROCEDURE — 97022 WHIRLPOOL THERAPY: CPT

## 2022-05-23 PROCEDURE — 97110 THERAPEUTIC EXERCISES: CPT

## 2022-05-23 PROCEDURE — 97140 MANUAL THERAPY 1/> REGIONS: CPT

## 2022-05-23 NOTE — PROGRESS NOTES
[facility]  Occupational Therapy Treatment Note    Date: 5/20/2022  Name: Raffy Rutherford Jr.  Clinic Number: [id number]    Therapy Diagnosis:   Encounter Diagnoses   Name Primary?    Pain in thumb joint with movement of left hand Yes    Decreased activities of daily living (ADL)      Physician: Kaye Solis MD    Physician Orders: eval and treat, fabrication of forearm based thumb splint to immobilize the thumb in slight abduction, the MP thumb at 0 and the wrist at 30 degrees ext, 3x weekly therapy     Medical Diagnosis: Laceration of extensor muscle and tendon of thumb at wrist and hand level [S66.229A]  Surgical Procedure and Date: 04/07/2022: 1. Left extensor pollicis longus primary repair, zone T 3, cpt 10979  2. Left extensor pollicis brevis primary repair, zone T3, cpt 98095  3. Irrigation and excisional debridement left hand skin, subcutaneous tissue and tendon, cpt 07116  4. Complex wound exploration and closure, cpt 24731  Evaluation Date: 04/13/2022  Insurance Authorization Period Expiration: 12/31/2022  Plan of Care Expiration: 06/10/2022  Date of Return to MD: 04/21/2022  Visit # / Visits authorized: 14 / 20  FOTO: initial eval      Precautions:  Standard and extensor tendon precautions     Time In: 10am  Time Out: 1050am  Total Billable Time: 50minutes      SUBJECTIVE     Pt reports: its feeling good. I felt good with the cello.My IF is what bothers me the most.  He was compliant with home exercise program given last session.   Response to previous treatment:none reported  Functional change: none reported    Pain: 3/10  Location: left thumb    OBJECTIVE   Objective Measures updated at progress report unless specified.    Edema. Measured in centimeters.    4/13/2022     Left      Thumb:     Prox. Phalanx 7.5   IP 8.0   Distal Phalanx 7.6      Elbow and Wrist ROM. Measured in degrees.    4/13/2022     Left               Wrist Ext/Flex NT   Wrist RD/UD NT      Hand  ROM. Measured in degrees.    4/13/2022 05/09/2022 5/17/2022     Left  PROM extension  AROM flexion JAY Left AROM Left AROM post heat   Thumb: MP 0/ 0/40 0/43                IP 0+/ 0+/30 0/40       Rad ADD/ABD NT 40 40       Pal ADD/ABD NT 50 48          Opposition NT Tip of SF Tip of SF           Treatment     Raffy received the treatments listed below:     Supervised modalities after being cleared for contradictions: Fluidotherapy: To left hand  for 10 min, continuous air, 115 deg, air speed 50 to decrease pain, edema & scar tissue, sensory re- education, and increased tissue extensibility prior to therex        Manual therapy techniques:were applied to the: for 8 minutes, including:  -retrograde massage  -astim to Left IF volar and palm  -scar massage to incision      Therapeutic exercises to develop ROM and flexibility for 25 minutes, including:  - wrist flx/ext, RD/UD x 10 reps  -thumb.AROM: x 10 reps each   -thumb palmar and radial abduction              - IP flex/ext    -opposition             - thumb flex composite             - thumb lifts            -   - isospheres x 3 min  -  Thumb spool wheel x 1' CW, x 1CCW  - yellow CP x 3 pt pinch x lateral pinch x 2 containers each  - nuts and bolts x 1 container   - Yellow CP x 3 containers    Patient Education and Home Exercises      Education provided:   -   - Progress towards goals     Written Home Exercises Provided: Patient instructed to cont prior HEP.  Exercises were reviewed and Raffy was able to demonstrate them prior to the end of the session.  Raffy demonstrated good  understanding of the HEP provided. See EMR under Patient Instructions for exercises provided during therapy sessions.      ASSESSMENT     Pt would continue to benefit from skilled OT. . He is moving well. He has little pain. He is eager to get back to playing cello. Pt. Able to start 10 min practice session, ease into it.  IF continues to cause pain. Added light strengthening without  difficulty this date.   Raffy is progressing well towards his goals and there are no updates to goals at this time. Pt prognosis is Good.     Pt will continue to benefit from skilled outpatient occupational therapy to address the deficits listed in the problem list on initial evaluation, provide pt/family education and to maximize pt's level of independence in the home and community environment.     Pt's spiritual, cultural and educational needs considered and pt agreeable to plan of care and goals.    Anticipated barriers to occupational therapy: none    Goals:  Goals:   The following goals were discussed with the patient and patient is in agreement with them as to be addressed in the treatment plan.   Long Term Goals (LTGs); to be met by discharge.  LTG #1: Pt will report a pain level of 0 out of 10 with ADLs - progressing    LTG #2: Pt will demo improved FOTO score by 20 points. - progressing   LTG #3: Pt will return to prior level of function for ADLs and household management. - progressing      Short Term Goals (STGs); to be met within 4 weeks (05/13/2022).  STG #1: Pt will report 3 out of 10 pain level with ADLs.- progressing   STG #2: Pt will report/demo [place] with playing cello.- progressing   STG #3: Pt will report/demo [place] with tying shoes.- progressing   STG #4: Pt will demonstrate independence with issued HEP. - progressing   STG #5: Pt will demo improved Thumb opposition to SF in order to complete playing music- progressing     PLAN     Continue skilled occupational therapy with individualized plan of care focusing on adhering to extensor tendon protocol    Updates/Grading for next session: cont per protocol ,     Jody Arnold, OTR/L,CHT

## 2022-05-23 NOTE — PROGRESS NOTES
[facility]  Occupational Therapy Treatment Note    Date: 5/23/2022  Name: Raffy Rutherford Jr.  Clinic Number: [id number]    Therapy Diagnosis:   Encounter Diagnoses   Name Primary?    Pain in thumb joint with movement of left hand Yes    Decreased activities of daily living (ADL)      Physician: Kaye Solis MD    Physician Orders: eval and treat, fabrication of forearm based thumb splint to immobilize the thumb in slight abduction, the MP thumb at 0 and the wrist at 30 degrees ext, 3x weekly therapy     Medical Diagnosis: Laceration of extensor muscle and tendon of thumb at wrist and hand level [S66.229A]  Surgical Procedure and Date: 04/07/2022: 1. Left extensor pollicis longus primary repair, zone T 3, cpt 15970  2. Left extensor pollicis brevis primary repair, zone T3, cpt 55478  3. Irrigation and excisional debridement left hand skin, subcutaneous tissue and tendon, cpt 07434  4. Complex wound exploration and closure, cpt 23186  Evaluation Date: 04/13/2022  Insurance Authorization Period Expiration: 12/31/2022  Plan of Care Expiration: 06/10/2022  Date of Return to MD: 04/21/2022  Visit # / Visits authorized: 14 / 20  FOTO: initial eval      Precautions:  Standard and extensor tendon precautions     Time In: 10am  Time Out: 1050am  Total Billable Time: 50minutes      SUBJECTIVE     Pt reports: I played cello for 20 min.My IF is what bothers me the most.  He was compliant with home exercise program given last session.   Response to previous treatment:none reported  Functional change: none reported    Pain: 3/10  Location: left thumb    OBJECTIVE   Objective Measures updated at progress report unless specified.    Edema. Measured in centimeters.    4/13/2022     Left      Thumb:     Prox. Phalanx 7.5   IP 8.0   Distal Phalanx 7.6      Elbow and Wrist ROM. Measured in degrees.    4/13/2022     Left               Wrist Ext/Flex NT   Wrist RD/UD NT      Hand ROM. Measured in  degrees.    4/13/2022 05/09/2022 5/17/2022     Left  PROM extension  AROM flexion JAY Left AROM Left AROM post heat   Thumb: MP 0/ 0/40 0/43                IP 0+/ 0+/30 0/40       Rad ADD/ABD NT 40 40       Pal ADD/ABD NT 50 48          Opposition NT Tip of SF Tip of SF           Treatment     Raffy received the treatments listed below:     Supervised modalities after being cleared for contradictions: Fluidotherapy: To left hand  for 10 min, continuous air, 115 deg, air speed 50 to decrease pain, edema & scar tissue, sensory re- education, and increased tissue extensibility prior to therex        Manual therapy techniques:were applied to the: for 8 minutes, including:  -retrograde massage  -astim to Left IF volar and palm  -scar massage to incision      Therapeutic exercises to develop ROM and flexibility for 25 minutes, including:  - wrist flx/ext, RD/UD x 10 reps  -thumb.AROM: x 10 reps each   -thumb palmar and radial abduction              - IP flex/ext    -opposition             - thumb flex composite             - thumb lifts            -   - isospheres x 3 min  - Thumb spool wheel x 1' CW, x 1CCW  - Red CP x 3 pt pinch x lateral pinch x 2 containers each  -wrist PRE x 1# x 30 reps x 3 ways    Patient Education and Home Exercises      Education provided:   -   - Progress towards goals     Written Home Exercises Provided: Patient instructed to cont prior HEP.  Exercises were reviewed and Raffy was able to demonstrate them prior to the end of the session.  Raffy demonstrated good  understanding of the HEP provided. See EMR under Patient Instructions for exercises provided during therapy sessions.      ASSESSMENT     Pt would continue to benefit from skilled OT. . He is moving well. He has little pain..  IF continues to cause pain. Added light strengthening without difficulty this date with progression with resistive pinching.     Raffy is progressing well towards his goals and there are no updates to goals at  this time. Pt prognosis is Good.     Pt will continue to benefit from skilled outpatient occupational therapy to address the deficits listed in the problem list on initial evaluation, provide pt/family education and to maximize pt's level of independence in the home and community environment.     Pt's spiritual, cultural and educational needs considered and pt agreeable to plan of care and goals.    Anticipated barriers to occupational therapy: none    Goals:  Goals:   The following goals were discussed with the patient and patient is in agreement with them as to be addressed in the treatment plan.   Long Term Goals (LTGs); to be met by discharge.  LTG #1: Pt will report a pain level of 0 out of 10 with ADLs - progressing    LTG #2: Pt will demo improved FOTO score by 20 points. - progressing   LTG #3: Pt will return to prior level of function for ADLs and household management. - progressing      Short Term Goals (STGs); to be met within 4 weeks (05/13/2022).  STG #1: Pt will report 3 out of 10 pain level with ADLs.- progressing   STG #2: Pt will report/demo [place] with playing cello.- progressing   STG #3: Pt will report/demo [place] with tying shoes.- progressing   STG #4: Pt will demonstrate independence with issued HEP. - progressing   STG #5: Pt will demo improved Thumb opposition to SF in order to complete playing music- progressing     PLAN     Continue skilled occupational therapy with individualized plan of care focusing on adhering to extensor tendon protocol    Updates/Grading for next session: cont per protocol ,     Jody Arnold, OTR/L,CHT

## 2022-05-24 ENCOUNTER — IMMUNIZATION (OUTPATIENT)
Dept: INTERNAL MEDICINE | Facility: CLINIC | Age: 72
End: 2022-05-24
Payer: COMMERCIAL

## 2022-05-24 DIAGNOSIS — Z23 NEED FOR VACCINATION: Primary | ICD-10-CM

## 2022-05-24 PROCEDURE — 91305 COVID-19, MRNA, LNP-S, PF, 30 MCG/0.3 ML DOSE VACCINE (PFIZER): CPT | Mod: PBBFAC | Performed by: INTERNAL MEDICINE

## 2022-05-25 ENCOUNTER — CLINICAL SUPPORT (OUTPATIENT)
Dept: REHABILITATION | Facility: HOSPITAL | Age: 72
End: 2022-05-25
Payer: COMMERCIAL

## 2022-05-25 DIAGNOSIS — M25.542 PAIN IN THUMB JOINT WITH MOVEMENT OF LEFT HAND: Primary | ICD-10-CM

## 2022-05-25 DIAGNOSIS — Z78.9 DECREASED ACTIVITIES OF DAILY LIVING (ADL): ICD-10-CM

## 2022-05-25 PROCEDURE — 97022 WHIRLPOOL THERAPY: CPT

## 2022-05-25 PROCEDURE — 97140 MANUAL THERAPY 1/> REGIONS: CPT

## 2022-05-25 PROCEDURE — 97110 THERAPEUTIC EXERCISES: CPT

## 2022-05-25 NOTE — PATIENT INSTRUCTIONS
[facility]  OCCUPATIONAL THERAPY  HOME EXERCISE PROGRAM     Complete the following strengthening program 1x/day.         X 3 min     X 10     x10       _     X 10                 JEET Magana, OTR/L, CHT   Occupational therapist, Certified Hand Therapist

## 2022-05-25 NOTE — PROGRESS NOTES
[facility]  Occupational Therapy Treatment Note    Date: 5/25/2022  Name: Raffy Rutherford Jr.  Clinic Number: [id number]    Therapy Diagnosis:   Encounter Diagnoses   Name Primary?    Pain in thumb joint with movement of left hand Yes    Decreased activities of daily living (ADL)      Physician: Kaye Solis MD    Physician Orders: eval and treat, fabrication of forearm based thumb splint to immobilize the thumb in slight abduction, the MP thumb at 0 and the wrist at 30 degrees ext, 3x weekly therapy     Medical Diagnosis: Laceration of extensor muscle and tendon of thumb at wrist and hand level [S66.229A]  Surgical Procedure and Date: 04/07/2022: 1. Left extensor pollicis longus primary repair, zone T 3, cpt 55395  2. Left extensor pollicis brevis primary repair, zone T3, cpt 25599  3. Irrigation and excisional debridement left hand skin, subcutaneous tissue and tendon, cpt 03273  4. Complex wound exploration and closure, cpt 85254  Evaluation Date: 04/13/2022  Insurance Authorization Period Expiration: 12/31/2022  Plan of Care Expiration: 06/10/2022  Date of Return to MD: 04/21/2022  Visit # / Visits authorized: 15 / 20  FOTO: initial eval      Precautions:  Standard and extensor tendon precautions     Time In: 10am  Time Out: 1050am  Total Billable Time: 50minutes      SUBJECTIVE     Pt reports: I played cello for 20 min.My IF is what bothers me the most.  He was compliant with home exercise program given last session.   Response to previous treatment:none reported  Functional change: none reported    Pain: 3/10  Location: left thumb    OBJECTIVE   Objective Measures updated at progress report unless specified.    Edema. Measured in centimeters.    4/13/2022     Left      Thumb:     Prox. Phalanx 7.5   IP 8.0   Distal Phalanx 7.6      Elbow and Wrist ROM. Measured in degrees.    4/13/2022     Left               Wrist Ext/Flex NT   Wrist RD/UD NT      Hand ROM. Measured in  degrees.    4/13/2022 05/09/2022 5/17/2022     Left  PROM extension  AROM flexion JAY Left AROM Left AROM post heat   Thumb: MP 0/ 0/40 0/43                IP 0+/ 0+/30 0/40       Rad ADD/ABD NT 40 40       Pal ADD/ABD NT 50 48          Opposition NT Tip of SF Tip of SF           Treatment     Raffy received the treatments listed below:     Supervised modalities after being cleared for contradictions: Fluidotherapy: To left hand  for 10 min, continuous air, 115 deg, air speed 50 to decrease pain, edema & scar tissue, sensory re- education, and increased tissue extensibility prior to therex        Manual therapy techniques:were applied to the: for 8 minutes, including:  -retrograde massage  -astim to Left IF volar and palm  -scar massage to incision      Therapeutic exercises to develop ROM and flexibility for 25 minutes, including:  - wrist flx/ext, RD/UD x 10 reps  -thumb.AROM: x 10 reps each   -thumb palmar and radial abduction              - IP flex/ext    -opposition             - thumb flex composite             - thumb lifts            -   - isospheres x 3 min  - Thumb spool wheel x 1' CW, x 1CCW  - Red CP x 3 pt pinch x lateral pinch x 2 containers each  -wrist PRE x 1# x 30 reps x 3 ways    Patient Education and Home Exercises      Education provided:   -   - Progress towards goals     Written Home Exercises Provided: Patient instructed to cont prior HEP.  Exercises were reviewed and Raffy was able to demonstrate them prior to the end of the session.  Raffy demonstrated good  understanding of the HEP provided. See EMR under Patient Instructions for exercises provided during therapy sessions.      ASSESSMENT     Pt would continue to benefit from skilled OT. . He is moving well. He has little pain..  IF continues to cause pain. Added light strengthening without difficulty this date with progression with resistive pinching.     Raffy is progressing well towards his goals and there are no updates to goals at  this time. Pt prognosis is Good.     Pt will continue to benefit from skilled outpatient occupational therapy to address the deficits listed in the problem list on initial evaluation, provide pt/family education and to maximize pt's level of independence in the home and community environment.     Pt's spiritual, cultural and educational needs considered and pt agreeable to plan of care and goals.    Anticipated barriers to occupational therapy: none    Goals:  Goals:   The following goals were discussed with the patient and patient is in agreement with them as to be addressed in the treatment plan.   Long Term Goals (LTGs); to be met by discharge.  LTG #1: Pt will report a pain level of 0 out of 10 with ADLs - progressing    LTG #2: Pt will demo improved FOTO score by 20 points. - progressing   LTG #3: Pt will return to prior level of function for ADLs and household management. - progressing      Short Term Goals (STGs); to be met within 4 weeks (05/13/2022).  STG #1: Pt will report 3 out of 10 pain level with ADLs.- progressing   STG #2: Pt will report/demo [place] with playing cello.- progressing   STG #3: Pt will report/demo [place] with tying shoes.- progressing   STG #4: Pt will demonstrate independence with issued HEP. - progressing   STG #5: Pt will demo improved Thumb opposition to SF in order to complete playing music- progressing     PLAN     Continue skilled occupational therapy with individualized plan of care focusing on adhering to extensor tendon protocol    Updates/Grading for next session: cont per protocol ,     Jody Arnold, OTR/L,CHT

## 2022-05-27 ENCOUNTER — CLINICAL SUPPORT (OUTPATIENT)
Dept: REHABILITATION | Facility: HOSPITAL | Age: 72
End: 2022-05-27
Payer: COMMERCIAL

## 2022-05-27 DIAGNOSIS — Z78.9 DECREASED ACTIVITIES OF DAILY LIVING (ADL): ICD-10-CM

## 2022-05-27 DIAGNOSIS — M25.542 PAIN IN THUMB JOINT WITH MOVEMENT OF LEFT HAND: Primary | ICD-10-CM

## 2022-05-27 PROCEDURE — 97022 WHIRLPOOL THERAPY: CPT

## 2022-05-27 PROCEDURE — 97140 MANUAL THERAPY 1/> REGIONS: CPT

## 2022-05-27 PROCEDURE — 97110 THERAPEUTIC EXERCISES: CPT

## 2022-05-27 NOTE — PROGRESS NOTES
[facility]  Occupational Therapy Treatment Note    Date: 5/27/2022  Name: Raffy Rutherford Jr.  Clinic Number: [id number]    Therapy Diagnosis:   Encounter Diagnoses   Name Primary?    Pain in thumb joint with movement of left hand Yes    Decreased activities of daily living (ADL)      Physician: Kaye Solis MD    Physician Orders: eval and treat, fabrication of forearm based thumb splint to immobilize the thumb in slight abduction, the MP thumb at 0 and the wrist at 30 degrees ext, 3x weekly therapy     Medical Diagnosis: Laceration of extensor muscle and tendon of thumb at wrist and hand level [S66.229A]  Surgical Procedure and Date: 04/07/2022: 1. Left extensor pollicis longus primary repair, zone T 3, cpt 57288  2. Left extensor pollicis brevis primary repair, zone T3, cpt 10421  3. Irrigation and excisional debridement left hand skin, subcutaneous tissue and tendon, cpt 74350  4. Complex wound exploration and closure, cpt 38796  Evaluation Date: 04/13/2022  Insurance Authorization Period Expiration: 12/31/2022  Plan of Care Expiration: 06/10/2022  Date of Return to MD: 04/21/2022  Visit # / Visits authorized: 16 / 20  FOTO: initial eval      Precautions:  Standard and extensor tendon precautions     Time In: 10am  Time Out: 1050am  Total Billable Time: 50minutes      SUBJECTIVE     Pt reports: The thumb is doing good, The IF is still bothering me  He was compliant with home exercise program given last session.   Response to previous treatment:none reported  Functional change: none reported    Pain: 3/10  Location: left thumb    OBJECTIVE   Objective Measures updated at progress report unless specified.    Edema. Measured in centimeters.    4/13/2022     Left      Thumb:     Prox. Phalanx 7.5   IP 8.0   Distal Phalanx 7.6      Elbow and Wrist ROM. Measured in degrees.    4/13/2022     Left               Wrist Ext/Flex NT   Wrist RD/UD NT      Hand ROM. Measured in  degrees.    4/13/2022 05/09/2022 5/17/2022     Left  PROM extension  AROM flexion JAY Left AROM Left AROM post heat   Thumb: MP 0/ 0/40 0/43                IP 0+/ 0+/30 0/40       Rad ADD/ABD NT 40 40       Pal ADD/ABD NT 50 48          Opposition NT Tip of SF Tip of SF           Treatment     Raffy received the treatments listed below:     Supervised modalities after being cleared for contradictions: Fluidotherapy: To left hand  for 10 min, continuous air, 115 deg, air speed 50 to decrease pain, edema & scar tissue, sensory re- education, and increased tissue extensibility prior to therex        Manual therapy techniques:were applied to the: for 8 minutes, including:  -retrograde massage  -astim to Left IF volar and palm  -scar massage to incision      Therapeutic exercises to develop ROM and flexibility for 25 minutes, including:  - wrist flx/ext, RD/UD x 10 reps  -thumb.AROM: x 10 reps each   -thumb palmar and radial abduction              - IP flex/ext    -opposition             - thumb flex composite             - thumb lifts            -   - isospheres x 3 min  - Red CP x 3 pt pinch x lateral pinch x 2 containers each  -wrist PRE x 2# x 30 reps x 3 ways  -HEP with yellow putty: twirling 3 min, slow squeeze x 10, pancake and bloom x 5, 3 jaw pinch x 5 logs,  lateral pinch x 5 logs Pt. demo'd understanding.       Patient Education and Home Exercises      Education provided:   -   - Progress towards goals     Written Home Exercises Provided: Patient instructed to cont prior HEP.  Exercises were reviewed and Raffy was able to demonstrate them prior to the end of the session.  Raffy demonstrated good  understanding of the HEP provided. See EMR under Patient Instructions for exercises provided during therapy sessions.      ASSESSMENT     Pt would continue to benefit from skilled OT. He is moving well. He has little pain..IF continues to cause pain. Cont light strengthening without difficulty this date with  progression with resistive pinching.     Raffy is progressing well towards his goals and there are no updates to goals at this time. Pt prognosis is Good.     Pt will continue to benefit from skilled outpatient occupational therapy to address the deficits listed in the problem list on initial evaluation, provide pt/family education and to maximize pt's level of independence in the home and community environment.     Pt's spiritual, cultural and educational needs considered and pt agreeable to plan of care and goals.    Anticipated barriers to occupational therapy: none    Goals:  Goals:   The following goals were discussed with the patient and patient is in agreement with them as to be addressed in the treatment plan.   Long Term Goals (LTGs); to be met by discharge.  LTG #1: Pt will report a pain level of 0 out of 10 with ADLs - progressing    LTG #2: Pt will demo improved FOTO score by 20 points. - progressing   LTG #3: Pt will return to prior level of function for ADLs and household management. - progressing      Short Term Goals (STGs); to be met within 4 weeks (05/13/2022).  STG #1: Pt will report 3 out of 10 pain level with ADLs.- progressing   STG #2: Pt will report/demo [place] with playing cello.- progressing   STG #3: Pt will report/demo [place] with tying shoes.- progressing   STG #4: Pt will demonstrate independence with issued HEP. - progressing   STG #5: Pt will demo improved Thumb opposition to SF in order to complete playing music- progressing     PLAN     Continue skilled occupational therapy with individualized plan of care focusing on adhering to extensor tendon protocol    Updates/Grading for next session: cont per protocol ,     Jody Arnold, OTR/L,CHT

## 2022-05-30 ENCOUNTER — PATIENT MESSAGE (OUTPATIENT)
Dept: PSYCHIATRY | Facility: CLINIC | Age: 72
End: 2022-05-30
Payer: COMMERCIAL

## 2022-05-30 ENCOUNTER — TELEPHONE (OUTPATIENT)
Dept: PSYCHIATRY | Facility: CLINIC | Age: 72
End: 2022-05-30
Payer: COMMERCIAL

## 2022-05-31 ENCOUNTER — CLINICAL SUPPORT (OUTPATIENT)
Dept: REHABILITATION | Facility: HOSPITAL | Age: 72
End: 2022-05-31
Payer: COMMERCIAL

## 2022-05-31 DIAGNOSIS — Z78.9 DECREASED ACTIVITIES OF DAILY LIVING (ADL): ICD-10-CM

## 2022-05-31 DIAGNOSIS — M25.542 PAIN IN THUMB JOINT WITH MOVEMENT OF LEFT HAND: Primary | ICD-10-CM

## 2022-05-31 PROCEDURE — 97018 PARAFFIN BATH THERAPY: CPT

## 2022-05-31 PROCEDURE — 97110 THERAPEUTIC EXERCISES: CPT

## 2022-05-31 PROCEDURE — 97140 MANUAL THERAPY 1/> REGIONS: CPT

## 2022-05-31 NOTE — PROGRESS NOTES
[facility]  Occupational Therapy Treatment Note    Date: 5/31/2022  Name: Raffy Rutherford Jr.  Clinic Number: [id number]    Therapy Diagnosis:   Encounter Diagnoses   Name Primary?    Pain in thumb joint with movement of left hand Yes    Decreased activities of daily living (ADL)      Physician: Kaye Solis MD    Physician Orders: eval and treat, fabrication of forearm based thumb splint to immobilize the thumb in slight abduction, the MP thumb at 0 and the wrist at 30 degrees ext, 3x weekly therapy     Medical Diagnosis: Laceration of extensor muscle and tendon of thumb at wrist and hand level [S66.229A]  Surgical Procedure and Date: 04/07/2022: 1. Left extensor pollicis longus primary repair, zone T 3, cpt 15785  2. Left extensor pollicis brevis primary repair, zone T3, cpt 26476  3. Irrigation and excisional debridement left hand skin, subcutaneous tissue and tendon, cpt 92955  4. Complex wound exploration and closure, cpt 52121  Evaluation Date: 04/13/2022  Insurance Authorization Period Expiration: 12/31/2022  Plan of Care Expiration: 06/10/2022  Date of Return to MD: 04/21/2022  Visit # / Visits authorized: 17/ 20  FOTO: initial eval      Precautions:  Standard and extensor tendon precautions     Time In: 105pm  Time Out: 155pm  Total Billable Time: 50minutes      SUBJECTIVE     Pt reports: He has been wearing the compression glove and it helps. He states he can play with the gloves on as well.   He was compliant with home exercise program given last session.   Response to previous treatment:none reported  Functional change: none reported    Pain: 3/10  Location: left thumb    OBJECTIVE   Objective Measures updated at progress report unless specified.    Edema. Measured in centimeters.    4/13/2022     Left      Thumb:     Prox. Phalanx 7.5   IP 8.0   Distal Phalanx 7.6      Elbow and Wrist ROM. Measured in degrees.    4/13/2022     Left               Wrist Ext/Flex NT    Wrist RD/UD NT      Hand ROM. Measured in degrees.    4/13/2022 05/09/2022 5/17/2022     Left  PROM extension  AROM flexion JAY Left AROM Left AROM post heat   Thumb: MP 0/ 0/40 0/43                IP 0+/ 0+/30 0/40       Rad ADD/ABD NT 40 40       Pal ADD/ABD NT 50 48          Opposition NT Tip of SF Tip of SF           Treatment     Raffy received the treatments listed below:     Supervised modalities after being cleared for contradictions: Patient received paraffin bath to left hand(s) for 10 minutes to increase blood flow, circulation, pain management and for tissue elasticity prior to therex.           Manual therapy techniques:were applied to the: for 8 minutes, including:  -retrograde massage  -astim to Left IF volar and palm  -scar massage to incision      Therapeutic exercises to develop ROM and flexibility for 25 minutes, including:    -thumb.AROM: x 10 reps each              - thumb circles x 10 each way    -thumb palmar and radial abduction              - IP flex/ext    -opposition             - thumb flex composite             - thumb lifts            -   - isospheres x 3 min  - Green  CP x 3 pt pinch x lateral pinch x 2 containers each  -wrist PRE x 2# x 3x 10 reps x 3 ways  - hand gripper with 1 red theraband x 30 reps    Patient Education and Home Exercises      Education provided:   - HEP with yellow putty: twirling 3 min, slow squeeze x 10, pancake and bloom x 5, 3 jaw pinch x 5 logs,  lateral pinch x 5 logs Pt. demo'd understanding.     - Progress towards goals     Written Home Exercises Provided: Patient instructed to cont prior HEP.  Exercises were reviewed and Raffy was able to demonstrate them prior to the end of the session.  Raffy demonstrated good  understanding of the HEP provided. See EMR under Patient Instructions for exercises provided during therapy sessions.      ASSESSMENT     Pt would continue to benefit from skilled OT. He is moving well. He has little pain..IF continues to  cause pain. He is doing better with edema with the compression. He has been doing the yellow theraputty and the red resistive pinch at home. He has been playing more instruments.     Raffy is progressing well towards his goals and there are no updates to goals at this time. Pt prognosis is Good.     Pt will continue to benefit from skilled outpatient occupational therapy to address the deficits listed in the problem list on initial evaluation, provide pt/family education and to maximize pt's level of independence in the home and community environment.     Pt's spiritual, cultural and educational needs considered and pt agreeable to plan of care and goals.    Anticipated barriers to occupational therapy: none    Goals:  Goals:   The following goals were discussed with the patient and patient is in agreement with them as to be addressed in the treatment plan.   Long Term Goals (LTGs); to be met by discharge.  LTG #1: Pt will report a pain level of 0 out of 10 with ADLs - progressing    LTG #2: Pt will demo improved FOTO score by 20 points. - progressing   LTG #3: Pt will return to prior level of function for ADLs and household management. - progressing      Short Term Goals (STGs); to be met within 4 weeks (05/13/2022).  STG #1: Pt will report 3 out of 10 pain level with ADLs.- MET 05/31/2022   STG #2: Pt will report/demo [place] with playing cello.- progressing   STG #3: Pt will report/demo [place] with tying shoes.- progressing   STG #4: Pt will demonstrate independence with issued HEP. - progressing   STG #5: Pt will demo improved Thumb opposition to SF in order to complete playing music- MET 05/31/2022    PLAN     Continue skilled occupational therapy with individualized plan of care focusing on adhering to extensor tendon protocol    Updates/Grading for next session: cont per protocol ,     Jody Arnold, OTR/L,CHT

## 2022-06-03 ENCOUNTER — CLINICAL SUPPORT (OUTPATIENT)
Dept: REHABILITATION | Facility: HOSPITAL | Age: 72
End: 2022-06-03
Payer: COMMERCIAL

## 2022-06-03 DIAGNOSIS — M25.542 PAIN IN THUMB JOINT WITH MOVEMENT OF LEFT HAND: Primary | ICD-10-CM

## 2022-06-03 DIAGNOSIS — Z78.9 DECREASED ACTIVITIES OF DAILY LIVING (ADL): ICD-10-CM

## 2022-06-03 PROCEDURE — 97110 THERAPEUTIC EXERCISES: CPT

## 2022-06-03 PROCEDURE — 97022 WHIRLPOOL THERAPY: CPT

## 2022-06-03 PROCEDURE — 97140 MANUAL THERAPY 1/> REGIONS: CPT

## 2022-06-03 NOTE — PROGRESS NOTES
[facility]  Occupational Therapy Treatment Note/DC Note    Date: 6/3/2022  Name: Raffy Rutherford Jr.  Clinic Number: [id number]    Therapy Diagnosis:   Encounter Diagnoses   Name Primary?    Pain in thumb joint with movement of left hand Yes    Decreased activities of daily living (ADL)      Physician: Kaye Solis MD    Physician Orders: eval and treat, fabrication of forearm based thumb splint to immobilize the thumb in slight abduction, the MP thumb at 0 and the wrist at 30 degrees ext, 3x weekly therapy     Medical Diagnosis: Laceration of extensor muscle and tendon of thumb at wrist and hand level [S66.229A]  Surgical Procedure and Date: 04/07/2022: 1. Left extensor pollicis longus primary repair, zone T 3, cpt 14374  2. Left extensor pollicis brevis primary repair, zone T3, cpt 75957  3. Irrigation and excisional debridement left hand skin, subcutaneous tissue and tendon, cpt 59393  4. Complex wound exploration and closure, cpt 97650  Evaluation Date: 04/13/2022  Insurance Authorization Period Expiration: 12/31/2022  Plan of Care Expiration: 06/10/2022  Date of Return to MD: 04/21/2022  Visit # / Visits authorized: 18/ 20  FOTO: initial eval      Precautions:  Standard and extensor tendon precautions     Time In: 1008am  Time Out: 1058  Total Billable Time: 50 minutes      SUBJECTIVE     Pt reports: He has been able to play a lot more lately and doing well.    He was compliant with home exercise program given last session.   Response to previous treatment:none reported  Functional change: none reported    Pain: 3/10  Location: left thumb    OBJECTIVE   Objective Measures updated at progress report unless specified.    Edema. Measured in centimeters.    4/13/2022     Left      Thumb:     Prox. Phalanx 7.5   IP 8.0   Distal Phalanx 7.6      Elbow and Wrist ROM. Measured in degrees.    4/13/2022     Left               Wrist Ext/Flex NT   Wrist RD/UD NT      Hand ROM.  Measured in degrees.    4/13/2022 05/09/2022 5/17/2022 06/03/2022     Left  PROM extension  AROM flexion JAY Left AROM Left AROM post heat Left AROM pre-heat   Thumb: MP 0/ 0/40 0/43 0/65                IP 0+/ 0+/30 0/40 0+/50       Rad ADD/ABD NT 40 40 55       Pal ADD/ABD NT 50 48 55          Opposition NT Tip of SF Tip of SF SF MP crease (same as non involved)        Strength: (in pounds/psi)        Date 06/03/2022 06/03/2022    Right Left   Rung II 85 55   Lateral pinch 18 12   Tripod pinch 14 11         Treatment     Raffy received the treatments listed below:     Supervised modalities after being cleared for contradictions: Fluidotherapy: To Left handfor 10 min, continuous air, 115 deg, air speed 50 to decrease pain, edema & scar tissue, sensory re- education, and increased tissue extensibility prior to therex          Manual therapy techniques:were applied to the: for 8 minutes, including:  -retrograde massage  -astim to Left IF volar and palm  -scar massage to incision      Therapeutic exercises to develop ROM and flexibility for 25 minutes, including:    -thumb.AROM: x 10 reps each              - thumb circles x 10 each way    -thumb palmar and radial abduction              - IP flex/ext    -opposition             - thumb flex composite             - thumb lifts            -   - isospheres x 3 min  - Blue CP x 3 pt pinch x lateral pinch x 2 containers each  -wrist PRE x 2# x 3x 10 reps x 3 ways  - hand gripper with 3 yellow theraband x 30 reps    Patient Education and Home Exercises      Education provided:   - HEP with yellow putty: twirling 3 min, slow squeeze x 10, pancake and bloom x 5, 3 jaw pinch x 5 logs,  lateral pinch x 5 logs Pt. demo'd understanding.     - Progress towards goals     Written Home Exercises Provided: Patient instructed to cont prior HEP.  Exercises were reviewed and Raffy was able to demonstrate them prior to the end of the session.  Raffy demonstrated good  understanding of  the HEP provided. See EMR under Patient Instructions for exercises provided during therapy sessions.      ASSESSMENT     Pt would continue to benefit from skilled OT. He is moving well. He has little pain..IF continues to cause pain. He is doing better with edema with the compression. He has been doing the yellow theraputty and the red resistive pinch at home. He has been playing more instruments.     Raffy is progressing well towards his goals and there are no updates to goals at this time. Pt prognosis is Good.     Pt will continue to benefit from skilled outpatient occupational therapy to address the deficits listed in the problem list on initial evaluation, provide pt/family education and to maximize pt's level of independence in the home and community environment.     Pt's spiritual, cultural and educational needs considered and pt agreeable to plan of care and goals.    Anticipated barriers to occupational therapy: none    Goals:  Goals:   The following goals were discussed with the patient and patient is in agreement with them as to be addressed in the treatment plan.   Long Term Goals (LTGs); to be met by discharge.  LTG #1: Pt will report a pain level of 0 out of 10 with ADLs - MET for thumb, IF still hurting  LTG #2: Pt will demo improved FOTO score by 20 points. - progressing   LTG #3: Pt will return to prior level of function for ADLs and household management. - MET     Short Term Goals (STGs); to be met within 4 weeks (05/13/2022).  STG #1: Pt will report 3 out of 10 pain level with ADLs.- MET 05/31/2022   STG #2: Pt will report/demo [place] with playing cello.- MET  STG #3: Pt will report/demo [place] with tying shoes.- MET  STG #4: Pt will demonstrate independence with issued HEP. - MET  STG #5: Pt will demo improved Thumb opposition to SF in order to complete playing music- MET 05/31/2022    PLAN     Pt. Ready to DC. He is doing well and he is going on a trip for a week and will contact  therapist if he needs anything. Pt. In agreement.     Jody Arnold, OTR/L,CHT

## 2022-06-15 ENCOUNTER — PATIENT MESSAGE (OUTPATIENT)
Dept: OPTOMETRY | Facility: CLINIC | Age: 72
End: 2022-06-15
Payer: COMMERCIAL

## 2022-06-17 ENCOUNTER — OFFICE VISIT (OUTPATIENT)
Dept: OPHTHALMOLOGY | Facility: CLINIC | Age: 72
End: 2022-06-17
Payer: COMMERCIAL

## 2022-06-17 DIAGNOSIS — H26.491 POSTERIOR CAPSULAR OPACIFICATION VISUALLY SIGNIFICANT, RIGHT EYE: Primary | ICD-10-CM

## 2022-06-17 PROCEDURE — 99999 PR PBB SHADOW E&M-EST. PATIENT-LVL III: ICD-10-PCS | Mod: PBBFAC,,, | Performed by: OPHTHALMOLOGY

## 2022-06-17 PROCEDURE — 1157F ADVNC CARE PLAN IN RCRD: CPT | Mod: CPTII,S$GLB,, | Performed by: OPHTHALMOLOGY

## 2022-06-17 PROCEDURE — 99202 OFFICE O/P NEW SF 15 MIN: CPT | Mod: S$GLB,,, | Performed by: OPHTHALMOLOGY

## 2022-06-17 PROCEDURE — 1159F PR MEDICATION LIST DOCUMENTED IN MEDICAL RECORD: ICD-10-PCS | Mod: CPTII,S$GLB,, | Performed by: OPHTHALMOLOGY

## 2022-06-17 PROCEDURE — 1126F PR PAIN SEVERITY QUANTIFIED, NO PAIN PRESENT: ICD-10-PCS | Mod: CPTII,S$GLB,, | Performed by: OPHTHALMOLOGY

## 2022-06-17 PROCEDURE — 99202 PR OFFICE/OUTPT VISIT, NEW, LEVL II, 15-29 MIN: ICD-10-PCS | Mod: S$GLB,,, | Performed by: OPHTHALMOLOGY

## 2022-06-17 PROCEDURE — 1160F PR REVIEW ALL MEDS BY PRESCRIBER/CLIN PHARMACIST DOCUMENTED: ICD-10-PCS | Mod: CPTII,S$GLB,, | Performed by: OPHTHALMOLOGY

## 2022-06-17 PROCEDURE — 3288F PR FALLS RISK ASSESSMENT DOCUMENTED: ICD-10-PCS | Mod: CPTII,S$GLB,, | Performed by: OPHTHALMOLOGY

## 2022-06-17 PROCEDURE — 1101F PT FALLS ASSESS-DOCD LE1/YR: CPT | Mod: CPTII,S$GLB,, | Performed by: OPHTHALMOLOGY

## 2022-06-17 PROCEDURE — 1160F RVW MEDS BY RX/DR IN RCRD: CPT | Mod: CPTII,S$GLB,, | Performed by: OPHTHALMOLOGY

## 2022-06-17 PROCEDURE — 1157F PR ADVANCE CARE PLAN OR EQUIV PRESENT IN MEDICAL RECORD: ICD-10-PCS | Mod: CPTII,S$GLB,, | Performed by: OPHTHALMOLOGY

## 2022-06-17 PROCEDURE — 1159F MED LIST DOCD IN RCRD: CPT | Mod: CPTII,S$GLB,, | Performed by: OPHTHALMOLOGY

## 2022-06-17 PROCEDURE — 1101F PR PT FALLS ASSESS DOC 0-1 FALLS W/OUT INJ PAST YR: ICD-10-PCS | Mod: CPTII,S$GLB,, | Performed by: OPHTHALMOLOGY

## 2022-06-17 PROCEDURE — 3288F FALL RISK ASSESSMENT DOCD: CPT | Mod: CPTII,S$GLB,, | Performed by: OPHTHALMOLOGY

## 2022-06-17 PROCEDURE — 99999 PR PBB SHADOW E&M-EST. PATIENT-LVL III: CPT | Mod: PBBFAC,,, | Performed by: OPHTHALMOLOGY

## 2022-06-17 PROCEDURE — 1126F AMNT PAIN NOTED NONE PRSNT: CPT | Mod: CPTII,S$GLB,, | Performed by: OPHTHALMOLOGY

## 2022-06-17 NOTE — PROGRESS NOTES
HPI     Triage pt  Patient states OD vision blurry/cloudy past 2 weeks.  Slight eye discomfort in corner. OS clearer than OD. Getting trouble with   bright light. Hazy.    I have personally interviewed the patient, reviewed the history and   examined the patient and agree with the technician's &/or resident's exam,   assessment and plan.       Last edited by Bean Mcpherson MD on 6/17/2022 10:04 AM. (History)            Assessment /Plan     For exam results, see Encounter Report.    Posterior capsular opacification visually significant, right eye      Appointment with Dr. Sequeira for possible YAG capsulotomy.

## 2022-06-20 ENCOUNTER — OFFICE VISIT (OUTPATIENT)
Dept: ORTHOPEDICS | Facility: CLINIC | Age: 72
End: 2022-06-20
Payer: COMMERCIAL

## 2022-06-20 VITALS — WEIGHT: 175 LBS | BODY MASS INDEX: 26.52 KG/M2 | HEIGHT: 68 IN

## 2022-06-20 DIAGNOSIS — M72.0 DUPUYTREN'S CONTRACTURE OF BOTH HANDS: ICD-10-CM

## 2022-06-20 DIAGNOSIS — S66.229A LACERATION OF EXTENSOR MUSCLE AND TENDON OF THUMB AT WRIST AND HAND LEVEL: Primary | ICD-10-CM

## 2022-06-20 PROCEDURE — 3008F PR BODY MASS INDEX (BMI) DOCUMENTED: ICD-10-PCS | Mod: CPTII,S$GLB,, | Performed by: ORTHOPAEDIC SURGERY

## 2022-06-20 PROCEDURE — 99999 PR PBB SHADOW E&M-EST. PATIENT-LVL III: ICD-10-PCS | Mod: PBBFAC,,, | Performed by: ORTHOPAEDIC SURGERY

## 2022-06-20 PROCEDURE — 1159F PR MEDICATION LIST DOCUMENTED IN MEDICAL RECORD: ICD-10-PCS | Mod: CPTII,S$GLB,, | Performed by: ORTHOPAEDIC SURGERY

## 2022-06-20 PROCEDURE — 1160F PR REVIEW ALL MEDS BY PRESCRIBER/CLIN PHARMACIST DOCUMENTED: ICD-10-PCS | Mod: CPTII,S$GLB,, | Performed by: ORTHOPAEDIC SURGERY

## 2022-06-20 PROCEDURE — 1160F RVW MEDS BY RX/DR IN RCRD: CPT | Mod: CPTII,S$GLB,, | Performed by: ORTHOPAEDIC SURGERY

## 2022-06-20 PROCEDURE — 1125F PR PAIN SEVERITY QUANTIFIED, PAIN PRESENT: ICD-10-PCS | Mod: CPTII,S$GLB,, | Performed by: ORTHOPAEDIC SURGERY

## 2022-06-20 PROCEDURE — 1159F MED LIST DOCD IN RCRD: CPT | Mod: CPTII,S$GLB,, | Performed by: ORTHOPAEDIC SURGERY

## 2022-06-20 PROCEDURE — 1157F PR ADVANCE CARE PLAN OR EQUIV PRESENT IN MEDICAL RECORD: ICD-10-PCS | Mod: CPTII,S$GLB,, | Performed by: ORTHOPAEDIC SURGERY

## 2022-06-20 PROCEDURE — 1157F ADVNC CARE PLAN IN RCRD: CPT | Mod: CPTII,S$GLB,, | Performed by: ORTHOPAEDIC SURGERY

## 2022-06-20 PROCEDURE — 1125F AMNT PAIN NOTED PAIN PRSNT: CPT | Mod: CPTII,S$GLB,, | Performed by: ORTHOPAEDIC SURGERY

## 2022-06-20 PROCEDURE — 99024 PR POST-OP FOLLOW-UP VISIT: ICD-10-PCS | Mod: S$GLB,,, | Performed by: ORTHOPAEDIC SURGERY

## 2022-06-20 PROCEDURE — 3008F BODY MASS INDEX DOCD: CPT | Mod: CPTII,S$GLB,, | Performed by: ORTHOPAEDIC SURGERY

## 2022-06-20 PROCEDURE — 99024 POSTOP FOLLOW-UP VISIT: CPT | Mod: S$GLB,,, | Performed by: ORTHOPAEDIC SURGERY

## 2022-06-20 PROCEDURE — 99999 PR PBB SHADOW E&M-EST. PATIENT-LVL III: CPT | Mod: PBBFAC,,, | Performed by: ORTHOPAEDIC SURGERY

## 2022-06-20 NOTE — PROGRESS NOTES
Raffy Rutherford JrLuisa presents for post-operative evaluation of   Encounter Diagnoses   Name Primary?    Laceration of extensor muscle and tendon of thumb at wrist and hand level Yes    Dupuytren's contracture of both hands    The patient is now 10 weeks s/p left thumb EPL and EPB repairs.  Overall the patient reports doing well and has been d/c'd from therapy. He he has been playing his instruments without difficulty.    He does note a new Dupuytren's cord between the left thumb and index finger volarly.  He does not have pain here and it is not limiting his thumb excursion.    PE:    AA&O x 4.  NAD  HEENT:  NCAT, sclera nonicteric  Lungs:  Respirations are equal and unlabored.  CV:  2+ bilateral upper and lower extremity pulses.  MSK: The incision is well healed.  Very good thumb EPL and EPB tendon excursion. Full wrist and finger motion.  Neurovascularly intact and has 5/5 thenar and intrinsic musculature strength.  Bilateral palm Dupuytren's cords.  Bilateral thumb webspace cords with out contracture of webspace.      A/P: Status post above, doing well; bilateral Dupuytren's disease  1) Continue with range of motion and strengthening  2) F/U prn  3) Call with any questions/concerns in the interim        Kaye Solis MD     Please be aware that this note has been generated with the assistance of MModal voice-to-text.  Please excuse any spelling or grammatical errors.

## 2022-06-28 ENCOUNTER — TELEPHONE (OUTPATIENT)
Dept: PSYCHIATRY | Facility: CLINIC | Age: 72
End: 2022-06-28
Payer: COMMERCIAL

## 2022-06-29 ENCOUNTER — PATIENT MESSAGE (OUTPATIENT)
Dept: OPHTHALMOLOGY | Facility: CLINIC | Age: 72
End: 2022-06-29
Payer: COMMERCIAL

## 2022-07-01 ENCOUNTER — OFFICE VISIT (OUTPATIENT)
Dept: OPHTHALMOLOGY | Facility: CLINIC | Age: 72
End: 2022-07-01
Payer: COMMERCIAL

## 2022-07-01 DIAGNOSIS — H26.491 POSTERIOR CAPSULAR OPACIFICATION VISUALLY SIGNIFICANT, RIGHT EYE: Primary | ICD-10-CM

## 2022-07-01 PROCEDURE — 99213 OFFICE O/P EST LOW 20 MIN: CPT | Mod: 57,S$GLB,, | Performed by: OPHTHALMOLOGY

## 2022-07-01 PROCEDURE — 1157F ADVNC CARE PLAN IN RCRD: CPT | Mod: CPTII,S$GLB,, | Performed by: OPHTHALMOLOGY

## 2022-07-01 PROCEDURE — 3288F PR FALLS RISK ASSESSMENT DOCUMENTED: ICD-10-PCS | Mod: CPTII,S$GLB,, | Performed by: OPHTHALMOLOGY

## 2022-07-01 PROCEDURE — 66821 YAG CAPSULOTOMY - OD - RIGHT EYE: ICD-10-PCS | Mod: RT,S$GLB,, | Performed by: OPHTHALMOLOGY

## 2022-07-01 PROCEDURE — 99999 PR PBB SHADOW E&M-EST. PATIENT-LVL III: ICD-10-PCS | Mod: PBBFAC,,, | Performed by: OPHTHALMOLOGY

## 2022-07-01 PROCEDURE — 99999 PR PBB SHADOW E&M-EST. PATIENT-LVL III: CPT | Mod: PBBFAC,,, | Performed by: OPHTHALMOLOGY

## 2022-07-01 PROCEDURE — 1101F PT FALLS ASSESS-DOCD LE1/YR: CPT | Mod: CPTII,S$GLB,, | Performed by: OPHTHALMOLOGY

## 2022-07-01 PROCEDURE — 1159F MED LIST DOCD IN RCRD: CPT | Mod: CPTII,S$GLB,, | Performed by: OPHTHALMOLOGY

## 2022-07-01 PROCEDURE — 66821 AFTER CATARACT LASER SURGERY: CPT | Mod: RT,S$GLB,, | Performed by: OPHTHALMOLOGY

## 2022-07-01 PROCEDURE — 1157F PR ADVANCE CARE PLAN OR EQUIV PRESENT IN MEDICAL RECORD: ICD-10-PCS | Mod: CPTII,S$GLB,, | Performed by: OPHTHALMOLOGY

## 2022-07-01 PROCEDURE — 1101F PR PT FALLS ASSESS DOC 0-1 FALLS W/OUT INJ PAST YR: ICD-10-PCS | Mod: CPTII,S$GLB,, | Performed by: OPHTHALMOLOGY

## 2022-07-01 PROCEDURE — 1126F PR PAIN SEVERITY QUANTIFIED, NO PAIN PRESENT: ICD-10-PCS | Mod: CPTII,S$GLB,, | Performed by: OPHTHALMOLOGY

## 2022-07-01 PROCEDURE — 3288F FALL RISK ASSESSMENT DOCD: CPT | Mod: CPTII,S$GLB,, | Performed by: OPHTHALMOLOGY

## 2022-07-01 PROCEDURE — 1159F PR MEDICATION LIST DOCUMENTED IN MEDICAL RECORD: ICD-10-PCS | Mod: CPTII,S$GLB,, | Performed by: OPHTHALMOLOGY

## 2022-07-01 PROCEDURE — 1126F AMNT PAIN NOTED NONE PRSNT: CPT | Mod: CPTII,S$GLB,, | Performed by: OPHTHALMOLOGY

## 2022-07-01 PROCEDURE — 99213 PR OFFICE/OUTPT VISIT, EST, LEVL III, 20-29 MIN: ICD-10-PCS | Mod: 57,S$GLB,, | Performed by: OPHTHALMOLOGY

## 2022-07-01 NOTE — PROGRESS NOTES
HPI     Referred by Dr Ocampo      S/p repeat superficial keratectomy OS 12/11/17 - for sheree   S/p superficial keratectomy OS 8/18/17  - for RES   Anterior basement membrane dystrophy   Salzmann's nodular deg.os     73 y/o male presents to clinic for possible YAG cap. Pt states     Last edited by Marie Spence on 7/1/2022  1:44 PM. (History)            Assessment /Plan     For exam results, see Encounter Report.    Posterior capsular opacification visually significant, right eye  -     Yag Capsulotomy - OD - Right Eye      Visually significant posterior capsular opacity present.  OD  - discussed risks, benefits, and alternatives to laser surgery - pt wishes to proceed with yag laser  - Informed consent obtained and correct eye(s) verified with patient.  - Intraocular Pressure to be taken 10- 30 minutes post procedure.   - PF QID x 4 days then d/c  - f/up as scheduled    DIAGNOSIS: Visually significant posterior capsular opacity    PROCEDURE: YAG Laser Capsulotomy OD    COMPLICATIONS: none     DESCRIPTION OF PROCEDURE IN DETAIL:  1 drop of topical Proparacaine and Iopidine instilled, and eye(s) dilated with 1% Tropicamide 2.5% Phenylephrine. YAG laser applied to posterior capsule in cruciate pattern.      DISPOSITION:  Patient tolerated procedure well.      Will call pt next wk to ensure doing well s/p yag cap    F/up matthew angel REE

## 2022-07-11 ENCOUNTER — OFFICE VISIT (OUTPATIENT)
Dept: INTERNAL MEDICINE | Facility: CLINIC | Age: 72
End: 2022-07-11
Payer: COMMERCIAL

## 2022-07-11 VITALS
WEIGHT: 185.63 LBS | OXYGEN SATURATION: 97 % | BODY MASS INDEX: 28.13 KG/M2 | HEIGHT: 68 IN | DIASTOLIC BLOOD PRESSURE: 62 MMHG | HEART RATE: 60 BPM | SYSTOLIC BLOOD PRESSURE: 104 MMHG

## 2022-07-11 DIAGNOSIS — Z20.822 SUSPECTED COVID-19 VIRUS INFECTION: ICD-10-CM

## 2022-07-11 DIAGNOSIS — F33.1 MODERATE EPISODE OF RECURRENT MAJOR DEPRESSIVE DISORDER: ICD-10-CM

## 2022-07-11 DIAGNOSIS — Z98.1 S/P LUMBAR SPINAL FUSION: ICD-10-CM

## 2022-07-11 DIAGNOSIS — M19.042 DEGENERATIVE ARTHRITIS OF METACARPOPHALANGEAL JOINT OF INDEX FINGER OF LEFT HAND: ICD-10-CM

## 2022-07-11 DIAGNOSIS — Z00.00 ROUTINE PHYSICAL EXAMINATION: Primary | ICD-10-CM

## 2022-07-11 DIAGNOSIS — C61 PROSTATE CANCER: ICD-10-CM

## 2022-07-11 DIAGNOSIS — M43.17 SPONDYLOLISTHESIS AT L5-S1 LEVEL: ICD-10-CM

## 2022-07-11 DIAGNOSIS — G60.0 CMT (CHARCOT-MARIE-TOOTH DISEASE): ICD-10-CM

## 2022-07-11 DIAGNOSIS — E78.5 HYPERLIPIDEMIA, UNSPECIFIED HYPERLIPIDEMIA TYPE: ICD-10-CM

## 2022-07-11 DIAGNOSIS — G95.89 OTHER SPECIFIED DISEASES OF SPINAL CORD: ICD-10-CM

## 2022-07-11 DIAGNOSIS — M54.50 CHRONIC LOW BACK PAIN, UNSPECIFIED BACK PAIN LATERALITY, UNSPECIFIED WHETHER SCIATICA PRESENT: ICD-10-CM

## 2022-07-11 DIAGNOSIS — G89.29 CHRONIC LOW BACK PAIN, UNSPECIFIED BACK PAIN LATERALITY, UNSPECIFIED WHETHER SCIATICA PRESENT: ICD-10-CM

## 2022-07-11 PROBLEM — M35.01 SICCA SYNDROME WITH KERATOCONJUNCTIVITIS: Status: RESOLVED | Noted: 2020-05-27 | Resolved: 2022-07-11

## 2022-07-11 PROBLEM — H16.229 SICCA SYNDROME WITH KERATOCONJUNCTIVITIS: Status: RESOLVED | Noted: 2020-05-27 | Resolved: 2022-07-11

## 2022-07-11 PROCEDURE — 3074F PR MOST RECENT SYSTOLIC BLOOD PRESSURE < 130 MM HG: ICD-10-PCS | Mod: CPTII,S$GLB,, | Performed by: INTERNAL MEDICINE

## 2022-07-11 PROCEDURE — 1160F PR REVIEW ALL MEDS BY PRESCRIBER/CLIN PHARMACIST DOCUMENTED: ICD-10-PCS | Mod: CPTII,S$GLB,, | Performed by: INTERNAL MEDICINE

## 2022-07-11 PROCEDURE — 3078F PR MOST RECENT DIASTOLIC BLOOD PRESSURE < 80 MM HG: ICD-10-PCS | Mod: CPTII,S$GLB,, | Performed by: INTERNAL MEDICINE

## 2022-07-11 PROCEDURE — 3074F SYST BP LT 130 MM HG: CPT | Mod: CPTII,S$GLB,, | Performed by: INTERNAL MEDICINE

## 2022-07-11 PROCEDURE — 1160F RVW MEDS BY RX/DR IN RCRD: CPT | Mod: CPTII,S$GLB,, | Performed by: INTERNAL MEDICINE

## 2022-07-11 PROCEDURE — 1100F PR PT FALLS ASSESS DOC 2+ FALLS/FALL W/INJURY/YR: ICD-10-PCS | Mod: CPTII,S$GLB,, | Performed by: INTERNAL MEDICINE

## 2022-07-11 PROCEDURE — 3008F PR BODY MASS INDEX (BMI) DOCUMENTED: ICD-10-PCS | Mod: CPTII,S$GLB,, | Performed by: INTERNAL MEDICINE

## 2022-07-11 PROCEDURE — 3008F BODY MASS INDEX DOCD: CPT | Mod: CPTII,S$GLB,, | Performed by: INTERNAL MEDICINE

## 2022-07-11 PROCEDURE — 1159F PR MEDICATION LIST DOCUMENTED IN MEDICAL RECORD: ICD-10-PCS | Mod: CPTII,S$GLB,, | Performed by: INTERNAL MEDICINE

## 2022-07-11 PROCEDURE — 99397 PR PREVENTIVE VISIT,EST,65 & OVER: ICD-10-PCS | Mod: S$GLB,,, | Performed by: INTERNAL MEDICINE

## 2022-07-11 PROCEDURE — 1125F AMNT PAIN NOTED PAIN PRSNT: CPT | Mod: CPTII,S$GLB,, | Performed by: INTERNAL MEDICINE

## 2022-07-11 PROCEDURE — 1157F PR ADVANCE CARE PLAN OR EQUIV PRESENT IN MEDICAL RECORD: ICD-10-PCS | Mod: CPTII,S$GLB,, | Performed by: INTERNAL MEDICINE

## 2022-07-11 PROCEDURE — 1159F MED LIST DOCD IN RCRD: CPT | Mod: CPTII,S$GLB,, | Performed by: INTERNAL MEDICINE

## 2022-07-11 PROCEDURE — 3078F DIAST BP <80 MM HG: CPT | Mod: CPTII,S$GLB,, | Performed by: INTERNAL MEDICINE

## 2022-07-11 PROCEDURE — 99397 PER PM REEVAL EST PAT 65+ YR: CPT | Mod: S$GLB,,, | Performed by: INTERNAL MEDICINE

## 2022-07-11 PROCEDURE — 1157F ADVNC CARE PLAN IN RCRD: CPT | Mod: CPTII,S$GLB,, | Performed by: INTERNAL MEDICINE

## 2022-07-11 PROCEDURE — 99999 PR PBB SHADOW E&M-EST. PATIENT-LVL V: ICD-10-PCS | Mod: PBBFAC,,, | Performed by: INTERNAL MEDICINE

## 2022-07-11 PROCEDURE — 3288F FALL RISK ASSESSMENT DOCD: CPT | Mod: CPTII,S$GLB,, | Performed by: INTERNAL MEDICINE

## 2022-07-11 PROCEDURE — 1125F PR PAIN SEVERITY QUANTIFIED, PAIN PRESENT: ICD-10-PCS | Mod: CPTII,S$GLB,, | Performed by: INTERNAL MEDICINE

## 2022-07-11 PROCEDURE — 1100F PTFALLS ASSESS-DOCD GE2>/YR: CPT | Mod: CPTII,S$GLB,, | Performed by: INTERNAL MEDICINE

## 2022-07-11 PROCEDURE — 99999 PR PBB SHADOW E&M-EST. PATIENT-LVL V: CPT | Mod: PBBFAC,,, | Performed by: INTERNAL MEDICINE

## 2022-07-11 PROCEDURE — 3288F PR FALLS RISK ASSESSMENT DOCUMENTED: ICD-10-PCS | Mod: CPTII,S$GLB,, | Performed by: INTERNAL MEDICINE

## 2022-07-11 RX ORDER — TRAZODONE HYDROCHLORIDE 50 MG/1
50 TABLET ORAL NIGHTLY
Qty: 90 TABLET | Refills: 3 | Status: SHIPPED | OUTPATIENT
Start: 2022-07-11 | End: 2023-07-01 | Stop reason: SDUPTHER

## 2022-07-11 RX ORDER — LAMOTRIGINE 200 MG/1
200 TABLET ORAL DAILY
Qty: 90 TABLET | Refills: 3 | Status: SHIPPED | OUTPATIENT
Start: 2022-07-11 | End: 2023-07-01 | Stop reason: SDUPTHER

## 2022-07-11 RX ORDER — ERGOCALCIFEROL 1.25 MG/1
50000 CAPSULE ORAL
Qty: 12 CAPSULE | Refills: 3 | Status: SHIPPED | OUTPATIENT
Start: 2022-07-11 | End: 2023-04-06 | Stop reason: SDUPTHER

## 2022-07-11 RX ORDER — CELECOXIB 100 MG/1
100 CAPSULE ORAL
COMMUNITY
End: 2023-04-14

## 2022-07-11 RX ORDER — TRAZODONE HYDROCHLORIDE 50 MG/1
50 TABLET ORAL NIGHTLY
COMMUNITY
End: 2022-07-11 | Stop reason: SDUPTHER

## 2022-07-11 RX ORDER — ERGOCALCIFEROL 1.25 MG/1
50000 CAPSULE ORAL
COMMUNITY
End: 2022-07-11 | Stop reason: SDUPTHER

## 2022-07-11 RX ORDER — PRAVASTATIN SODIUM 40 MG/1
40 TABLET ORAL DAILY
Qty: 90 TABLET | Refills: 3 | Status: SHIPPED | OUTPATIENT
Start: 2022-07-11 | End: 2022-07-20

## 2022-07-11 NOTE — PROGRESS NOTES
Subjective:       Patient ID: Raffy Rutherford Jr. is a 72 y.o. male.    Chief Complaint: Annual Exam    Patient here for annual exam.  He has a number of issues to discuss including left index finger MCP swelling and pain history of cancer monitor with watchful waiting, CMT syndrome with gait disturbance, possible problems with his AFO braces.  He would like a follow-up with physical therapy and he would like to consider seeing a specialist in braces such as PMR.  He is having some issues with his AFO braces and would like a follow-up.  He is supposed to see Urology and Radiation Oncology in the near future and would like to update the PSA.  He is due for a few other blood labs.    Review of Systems   Constitutional: Negative for chills, fatigue and fever.   HENT: Negative for nosebleeds and trouble swallowing.    Eyes: Negative for pain and visual disturbance.   Respiratory: Negative for cough, shortness of breath and wheezing.    Cardiovascular: Negative for chest pain and palpitations.   Gastrointestinal: Negative for abdominal pain, constipation, diarrhea, nausea and vomiting.   Genitourinary: Negative for difficulty urinating and hematuria.   Musculoskeletal: Positive for gait problem and joint swelling (left MCP). Negative for arthralgias, back pain and neck pain.   Integumentary:  Negative for rash.   Neurological: Positive for tremors, disturbances in coordination and coordination difficulties. Negative for dizziness and headaches.   Hematological: Does not bruise/bleed easily.   Psychiatric/Behavioral: Negative for dysphoric mood and sleep disturbance.           Past Medical History:   Diagnosis Date    Allergy     Arthritis     Back pain     after trauma beginning in 195    Cataract     Chronic pain     neck and left shoulder    Cluster headache 2013    Colon polyps 2007 2007-2019: TA x5, HP x3    Degenerative disc disease     Depression     Diverticulitis 12/2013    Fibromyalgia 2013    GERD  (gastroesophageal reflux disease)     Hepatitis 1970's    A    History of prostate biopsy 2002    Hyperlipidemia     Joint pain     Prostate cancer 07/2021    Sleep apnea     Thyroid nodule 7/16/2014    Tubular adenoma of colon 2007    5 removed [id number]    Visual disturbance 2012    problems after cataract surgery     Past Surgical History:   Procedure Laterality Date    BACK SURGERY      CARPAL TUNNEL RELEASE Right 5/18/2021    Procedure: RELEASE, CARPAL TUNNEL;  Surgeon: Kaye Solis MD;  Location: [facility];  Service: Orthopedics;  Laterality: Right;    CATARACT EXTRACTION W/  INTRAOCULAR LENS IMPLANT Bilateral     CHOLECYSTECTOMY      COLONOSCOPY N/A 12/17/2019    Normal - Repeat 5yrs    COLONOSCOPY  2007 2007 TA x2, 2011 TA x3, 2014 HP x3, 2019 normal    COSMETIC SURGERY  2/10/2015    Direct mid-forehead brow lift    COSMETIC SURGERY  2/10/2015    Bilateral upper lid blepahroplasty    CYSTOSCOPY WITH URETEROSCOPY, RETROGRADE PYELOGRAPHY, AND INSERTION OF STENT Left 8/19/2020    Procedure: CYSTOSCOPY, WITH RETROGRADE PYELOGRAM AND URETERAL STENT INSERTION;  Surgeon: Katelynn George MD;  Location: [facility] OR;  Service: Urology;  Laterality: Left;    ESOPHAGOGASTRODUODENOSCOPY N/A 12/17/2019    Procedure: ESOPHAGOGASTRODUODENOSCOPY (EGD);  Surgeon: Amadou Hardin MD;  Location: [facility] ([address] [place] [address]);  Service: Endoscopy;  Laterality: N/A;    EYE SURGERY      FUSION OF LUMBAR SPINE BY ANTERIOR APPROACH N/A 8/20/2020    Procedure: FUSION, SPINE, LUMBAR, ANTERIOR APPROACH L5-S1 ALIF Stand Alone;  Surgeon: Jason Caldwell MD;  Location: [facility] OR;  Service: Neurosurgery;  Laterality: N/A;    HEMORRHOID SURGERY      with complication of chronic bleeding for 6 weeks     INJECTION OF STEROID Right 12/6/2018    Procedure: INJECTION, STEROID Right SI Joint Block and Steroid Injection;  Surgeon: Jason Caldwell MD;  Location: [facility] OR;  Service: Neurosurgery;  Laterality: Right;  Procedure: Right SI  Joint Block and Steroid Injection  Surgery Time: 30 MIN  LOS: 0  Anesthesia: MAC  Radiology: C-arm  Bed: Favian 4 Poster  Position: Prone    INJECTION OF STEROID Right 9/19/2019    Procedure: INJECTION, STEROID Procedure: Right SI joint block nd steroid injection;  Surgeon: Jason Caldwell MD;  Location: [facility];  Service: Neurosurgery;  Laterality: Right;  Procedure: Right SI joint block nd steroid injection  Surgery Time: 30 mins  LOS:   Anesthesia: General MAC  Radiology:C-arm  Bed: Regular Bed  Position: Prone    IRRIGATION AND DEBRIDEMENT OF UPPER EXTREMITY Left 4/7/2022    Procedure: IRRIGATION AND DEBRIDEMENT, UPPER EXTREMITY;  Surgeon: Kaye Solis MD;  Location: [facility] OR;  Service: Orthopedics;  Laterality: Left;    JOINT REPLACEMENT      KNEE SURGERY      involving arthroscopic surgery to both knees    REPAIR OF EXTENSOR TENDON Left 4/7/2022    Procedure: REPAIR, TENDON, EXTENSOR thumb, EPB and EPL;  Surgeon: Kaye Solis MD;  [facility];  Service: Orthopedics;  Laterality: Left;    SINUS SURGERY      left molar and sinus surgery for trigeminal neuralgia    SPINAL FUSION  06/22/2015    L3-L5 XLIF/TANA    TOTAL HIP ARTHROPLASTY  4/2012    Pt states he had total hip replacement on his left hip.    ULNAR NERVE TRANSPOSITION Left 12/16/2020    Procedure: TRANSPOSITION, NERVE, ULNAR - left carpal and cubital tunnel releases;  Surgeon: Addi Bauer MD;  [facility];  Service: Orthopedics;  Laterality: Left;      Patient Active Problem List   Diagnosis    Depression    Hyperlipidemia    Chronic pain    Adenomatous polyp    GERD (gastroesophageal reflux disease)    Back pain    Sleep apnea    Visual disturbances    Spondylosis without myelopathy    Degeneration of lumbar or lumbosacral intervertebral disc    Spinal stenosis, lumbar region, without neurogenic claudication    Thoracic or lumbosacral neuritis or radiculitis, unspecified    Displacement of lumbar  intervertebral disc without myelopathy    Acquired spondylolisthesis    Lumbago    Status post cataract extraction and insertion of intraocular lens - Both Eyes    Fibromyalgia    OP (osteoporosis)    Compression fracture of T12 vertebra    Diverticulitis large intestine    Osteoarthritis    Lower back pain    Lower extremity pain    Muscle weakness    Range of motion deficit    Facet joint disease of cervical region    Occipital neuralgia    Chronic migraine without aura, with intractable migraine, so stated, with status migrainosus    Cervical radiculopathy    Paroxysmal hemicrania    Sciatic nerve pain    Chronic LBP    DJD (degenerative joint disease) of lumbar spine    Chronic neck pain    Right lumbar radiculopathy    Thyroid nodule    Carpal tunnel syndrome of right wrist    Paresthesia    Degenerative disc disease    S/P lumbar spinal fusion    Right wrist tendinitis    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    Headache around the eyes    Closed fracture of left distal radius    Bilateral foot-drop    Idiopathic peripheral neuropathy    Sacroiliac joint dysfunction of right side    SI (sacroiliac) joint dysfunction    Episodic migraine without status migrainosus, not intractable    Lumbar disc herniation with radiculopathy    Anxiety about health    MDD (major depressive disorder), recurrent episode, moderate    Anxiety    History of colon polyps    Iron deficiency anemia    DDD (degenerative disc disease), lumbosacral    Spondylolisthesis at L5-S1 level    CTS (carpal tunnel syndrome)    Pain in left hand    Right carpal tunnel syndrome    Decreased range of motion of neck    Poor posture    Decreased strength of upper extremity    Prostate cancer    Impaired gait    Balance problems    Genetic disorder    Bilateral carotid artery stenosis    Other specified diseases of spinal cord    Chronic pain of left hand    Pain in thumb joint  with movement of left hand    Decreased activities of daily living (ADL)        Objective:      Physical Exam  Constitutional:       General: He is not in acute distress.     Appearance: He is well-developed.   HENT:      Head: Normocephalic and atraumatic.      Right Ear: Tympanic membrane, ear canal and external ear normal.      Left Ear: Tympanic membrane, ear canal and external ear normal.      Mouth/Throat:      Pharynx: No oropharyngeal exudate or posterior oropharyngeal erythema.   Eyes:      General: No scleral icterus.     Conjunctiva/sclera: Conjunctivae normal.      Pupils: Pupils are equal, round, and reactive to light.   Neck:      Thyroid: No thyromegaly.      Comments: No supraclavicular nodes palpated  Cardiovascular:      Rate and Rhythm: Normal rate and regular rhythm.      Pulses: Normal pulses.      Heart sounds: Normal heart sounds. No murmur heard.  Pulmonary:      Effort: Pulmonary effort is normal.      Breath sounds: Normal breath sounds. No wheezing.   Abdominal:      General: Bowel sounds are normal.      Palpations: Abdomen is soft. There is no mass.      Tenderness: There is no abdominal tenderness.   Musculoskeletal:         General: Swelling (left MCP joint) present. No tenderness.      Cervical back: Normal range of motion and neck supple.      Right lower leg: No edema.      Left lower leg: No edema.   Lymphadenopathy:      Cervical: No cervical adenopathy.   Skin:     Coloration: Skin is not jaundiced or pale.   Neurological:      General: No focal deficit present.      Mental Status: He is alert and oriented to person, place, and time.      Coordination: Coordination abnormal.      Gait: Gait abnormal.   Psychiatric:         Mood and Affect: Mood normal.         Behavior: Behavior normal.         Assessment:       Problem List Items Addressed This Visit        Neuro    S/P lumbar spinal fusion    Relevant Orders    TSH    Other specified diseases of spinal cord       Psychiatric     Depression       Cardiac/Vascular    Hyperlipidemia    Relevant Orders    Lipid Panel    TSH       Oncology    Prostate cancer    Relevant Orders    Prostate Specific Antigen, Diagnostic    TSH       Orthopedic    Chronic LBP    Relevant Orders    TSH    Ambulatory referral/consult to Physical Medicine Rehab    Spondylolisthesis at L5-S1 level      Other Visit Diagnoses     Routine physical examination    -  Primary    Relevant Orders    Lipid Panel    TSH    Degenerative arthritis of metacarpophalangeal joint of index finger of left hand        Relevant Orders    TSH    CMT (Charcot-Selena-Tooth disease)        Relevant Orders    Ambulatory referral/consult to Physical/Occupational Therapy    TSH    Ambulatory referral/consult to Physical Medicine Rehab    Suspected COVID-19 virus infection        Relevant Orders    Hemoglobin A1C          Plan:         Raffy was seen today for annual exam.    Diagnoses and all orders for this visit:    Routine physical examination  -     Lipid Panel; Future  -     TSH; Future    Prostate cancer  -     Prostate Specific Antigen, Diagnostic; Future  -     TSH; Future    S/P lumbar spinal fusion  -     TSH; Future    Hyperlipidemia, unspecified hyperlipidemia type  -     Lipid Panel; Future  -     TSH; Future    Chronic low back pain, unspecified back pain laterality, unspecified whether sciatica present  -     TSH; Future  -     Ambulatory referral/consult to Physical Medicine Rehab; Future    Degenerative arthritis of metacarpophalangeal joint of index finger of left hand  -     TSH; Future    CMT (Charcot-Selena-Tooth disease)  -     Ambulatory referral/consult to Physical/Occupational Therapy; Future  -     TSH; Future  -     Ambulatory referral/consult to Physical Medicine Rehab; Future    Suspected COVID-19 virus infection  -     Hemoglobin A1C; Future    Spondylolisthesis at L5-S1 level    Moderate episode of recurrent major depressive disorder    Other specified diseases of  "spinal cord    Other orders  -     selegiline (EMSAM) 12 mg/24 hr; Place 1 patch onto the skin once daily.  -     lamoTRIgine (LAMICTAL) 200 MG tablet; Take 1 tablet (200 mg total) by mouth once daily.  -     traZODone (DESYREL) 50 MG tablet; Take 1 tablet (50 mg total) by mouth every evening.  -     pravastatin (PRAVACHOL) 40 MG tablet; Take 1 tablet (40 mg total) by mouth once daily.  -     ergocalciferol (VITAMIN D2) 50,000 unit Cap; Take 1 capsule (50,000 Units total) by mouth every 7 days.                     Portions of this note may have been created with voice recognition software. Occasional "wrong-word" or "sound-a-like" substitutions may have occurred due to the inherent limitations of voice recognition software. Please, read the note carefully and recognize, using context, where substitutions have occurred.  "

## 2022-07-12 ENCOUNTER — TELEPHONE (OUTPATIENT)
Dept: PHARMACY | Facility: CLINIC | Age: 72
End: 2022-07-12
Payer: COMMERCIAL

## 2022-07-12 ENCOUNTER — PATIENT MESSAGE (OUTPATIENT)
Dept: INTERNAL MEDICINE | Facility: CLINIC | Age: 72
End: 2022-07-12
Payer: COMMERCIAL

## 2022-07-12 ENCOUNTER — PATIENT MESSAGE (OUTPATIENT)
Dept: ORTHOPEDICS | Facility: CLINIC | Age: 72
End: 2022-07-12
Payer: COMMERCIAL

## 2022-07-12 ENCOUNTER — LAB VISIT (OUTPATIENT)
Dept: LAB | Facility: HOSPITAL | Age: 72
End: 2022-07-12
Attending: INTERNAL MEDICINE
Payer: COMMERCIAL

## 2022-07-12 DIAGNOSIS — Z00.00 ROUTINE PHYSICAL EXAMINATION: ICD-10-CM

## 2022-07-12 DIAGNOSIS — M19.042 DEGENERATIVE ARTHRITIS OF METACARPOPHALANGEAL JOINT OF INDEX FINGER OF LEFT HAND: ICD-10-CM

## 2022-07-12 DIAGNOSIS — M54.50 CHRONIC LOW BACK PAIN, UNSPECIFIED BACK PAIN LATERALITY, UNSPECIFIED WHETHER SCIATICA PRESENT: ICD-10-CM

## 2022-07-12 DIAGNOSIS — Z98.1 S/P LUMBAR SPINAL FUSION: ICD-10-CM

## 2022-07-12 DIAGNOSIS — C61 PROSTATE CANCER: ICD-10-CM

## 2022-07-12 DIAGNOSIS — G89.29 CHRONIC LOW BACK PAIN, UNSPECIFIED BACK PAIN LATERALITY, UNSPECIFIED WHETHER SCIATICA PRESENT: ICD-10-CM

## 2022-07-12 DIAGNOSIS — Z20.822 SUSPECTED COVID-19 VIRUS INFECTION: ICD-10-CM

## 2022-07-12 DIAGNOSIS — E78.5 HYPERLIPIDEMIA, UNSPECIFIED HYPERLIPIDEMIA TYPE: ICD-10-CM

## 2022-07-12 DIAGNOSIS — G60.0 CMT (CHARCOT-MARIE-TOOTH DISEASE): ICD-10-CM

## 2022-07-12 LAB
CHOLEST SERPL-MCNC: 252 MG/DL (ref 120–199)
CHOLEST/HDLC SERPL: 3.1 {RATIO} (ref 2–5)
COMPLEXED PSA SERPL-MCNC: 9.4 NG/ML (ref 0–4)
ESTIMATED AVG GLUCOSE: 114 MG/DL (ref 68–131)
HBA1C MFR BLD: 5.6 % (ref 4–5.6)
HDLC SERPL-MCNC: 81 MG/DL (ref 40–75)
HDLC SERPL: 32.1 % (ref 20–50)
LDLC SERPL CALC-MCNC: 140.4 MG/DL (ref 63–159)
NONHDLC SERPL-MCNC: 171 MG/DL
TRIGL SERPL-MCNC: 153 MG/DL (ref 30–150)
TSH SERPL DL<=0.005 MIU/L-ACNC: 1.02 UIU/ML (ref 0.4–4)

## 2022-07-12 PROCEDURE — 80061 LIPID PANEL: CPT | Performed by: INTERNAL MEDICINE

## 2022-07-12 PROCEDURE — 84443 ASSAY THYROID STIM HORMONE: CPT | Performed by: INTERNAL MEDICINE

## 2022-07-12 PROCEDURE — 84153 ASSAY OF PSA TOTAL: CPT | Performed by: INTERNAL MEDICINE

## 2022-07-12 PROCEDURE — 36415 COLL VENOUS BLD VENIPUNCTURE: CPT | Performed by: INTERNAL MEDICINE

## 2022-07-12 PROCEDURE — 83036 HEMOGLOBIN GLYCOSYLATED A1C: CPT | Performed by: INTERNAL MEDICINE

## 2022-07-12 RX ORDER — CLONAZEPAM 0.5 MG/1
TABLET ORAL
Qty: 60 TABLET | Refills: 5 | Status: CANCELLED | OUTPATIENT
Start: 2022-07-12

## 2022-07-12 RX ORDER — CLONAZEPAM 0.5 MG/1
TABLET ORAL
Qty: 60 TABLET | Refills: 5 | Status: SHIPPED | OUTPATIENT
Start: 2022-07-12 | End: 2023-01-29 | Stop reason: SDUPTHER

## 2022-07-12 NOTE — TELEPHONE ENCOUNTER
DOCUMENTATION ONLY  Emsam 12mg/24 hr patches  Approval date: 7/11/2022 to 7/10/2023  Case ID# [id number]

## 2022-07-12 NOTE — TELEPHONE ENCOUNTER
Care Due:                  Date            Visit Type   Department     Provider  --------------------------------------------------------------------------------                                MYCHART                              ANNUAL                              CHECKUP/PHY  [facility] INTERNAL  Last Visit: 07-      [facility]       Haseeb Puentes  Next Visit: None Scheduled  None         None Found                                                            Last  Test          Frequency    Reason                     Performed    Due Date  --------------------------------------------------------------------------------    Lipid Panel.  12 months..  pravastatin..............  09- 09-    [facility] Embedded Care Gaps. Reference number: [id number]. 7/12/2022   2:40:05 PM CDT

## 2022-07-13 DIAGNOSIS — G89.29 CHRONIC PAIN OF LEFT HAND: ICD-10-CM

## 2022-07-13 DIAGNOSIS — M19.042 PRIMARY OSTEOARTHRITIS OF BOTH HANDS: Primary | ICD-10-CM

## 2022-07-13 DIAGNOSIS — M19.041 PRIMARY OSTEOARTHRITIS OF BOTH HANDS: Primary | ICD-10-CM

## 2022-07-13 DIAGNOSIS — M79.642 CHRONIC PAIN OF LEFT HAND: ICD-10-CM

## 2022-07-14 ENCOUNTER — PATIENT MESSAGE (OUTPATIENT)
Dept: ORTHOPEDICS | Facility: CLINIC | Age: 72
End: 2022-07-14
Payer: COMMERCIAL

## 2022-07-15 ENCOUNTER — PATIENT MESSAGE (OUTPATIENT)
Dept: REHABILITATION | Facility: HOSPITAL | Age: 72
End: 2022-07-15
Payer: COMMERCIAL

## 2022-07-18 ENCOUNTER — PATIENT MESSAGE (OUTPATIENT)
Dept: INTERNAL MEDICINE | Facility: CLINIC | Age: 72
End: 2022-07-18
Payer: COMMERCIAL

## 2022-07-19 ENCOUNTER — CLINICAL SUPPORT (OUTPATIENT)
Dept: REHABILITATION | Facility: HOSPITAL | Age: 72
End: 2022-07-19
Attending: ORTHOPAEDIC SURGERY
Payer: COMMERCIAL

## 2022-07-19 DIAGNOSIS — M79.642 CHRONIC PAIN OF LEFT HAND: Primary | ICD-10-CM

## 2022-07-19 DIAGNOSIS — G89.29 CHRONIC PAIN OF LEFT HAND: Primary | ICD-10-CM

## 2022-07-19 PROCEDURE — 97810 ACUP 1/> WO ESTIM 1ST 15 MIN: CPT

## 2022-07-19 PROCEDURE — 97811 ACUP 1/> W/O ESTIM EA ADD 15: CPT

## 2022-07-19 NOTE — PROGRESS NOTES
Acupuncture Treatment Note     Name: Raffy Rutherford Jr.  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  Physician: Kaye Solis MD    Date of Service: 7/19/2022     Medical Diagnosis: Chronic left hand pain     Evaluation Date: 12/7/2021  Plan of Care Certification Period: 12  Visit #/Visits authorized:8 / 12    Precautions: Standard    Subjective     Chief Complaint:  Chronic left hand pain     Response to Previous Treatment:  The same     Quality of Symptoms (Better/Worse):  The same    Other Condition/Symptoms:  None    Objective      New Findings:  None    Treatment Principles:  relax muscles of the fingers, increase blood circulation to the hand, reduce inflammation in hand joint    Acupuncture Points:   Left:  LI 4,  LI 10 Sole 9 , LI 12, Marie on left hand + 6    NEEDLES W/O STIM  AT: 5:00 PM    NEEDLES W/O STIM REMOVED AT: 5:30 PM    #NEEDLES IN: 10    #NEEDLES OUT: 10    Other Traditional Chinese Medicine Modalities:  None    Recommendations:  PT    Education:  Patient is aware of cumulative effect of acupuncture      Assessment      Analysis of Treatment:  Patient felt good    Pt prognosis is Good.     Patient will continue to benefit from acupuncture treatment to address the deficits listed in the problem list box on initial evaluation, provide patient family education and to maximize pt's level of independence in the home and community environment.     Patient's spiritual, cultural and educational needs considered and pt agreeable to plan of care and goals.     Anticipated barriers to treatment: None    Plan     Recommend     1-2   /week for 12  treatments and re-assess.

## 2022-07-20 RX ORDER — ATORVASTATIN CALCIUM 40 MG/1
40 TABLET, FILM COATED ORAL DAILY
Qty: 90 TABLET | Refills: 3 | Status: SHIPPED | OUTPATIENT
Start: 2022-07-20 | End: 2023-08-09 | Stop reason: SDUPTHER

## 2022-07-22 ENCOUNTER — PATIENT MESSAGE (OUTPATIENT)
Dept: NEUROLOGY | Facility: CLINIC | Age: 72
End: 2022-07-22
Payer: COMMERCIAL

## 2022-07-22 ENCOUNTER — CLINICAL SUPPORT (OUTPATIENT)
Dept: REHABILITATION | Facility: HOSPITAL | Age: 72
End: 2022-07-22
Payer: MEDICARE

## 2022-07-22 DIAGNOSIS — M79.642 CHRONIC PAIN OF LEFT HAND: ICD-10-CM

## 2022-07-22 DIAGNOSIS — M77.8 TENDONITIS OF FINGER: Primary | ICD-10-CM

## 2022-07-22 DIAGNOSIS — G89.29 CHRONIC PAIN OF LEFT HAND: ICD-10-CM

## 2022-07-22 PROCEDURE — 97811 ACUP 1/> W/O ESTIM EA ADD 15: CPT

## 2022-07-22 PROCEDURE — 97810 ACUP 1/> WO ESTIM 1ST 15 MIN: CPT

## 2022-07-22 NOTE — PROGRESS NOTES
Acupuncture Treatment Note     Name: Raffy Rutherford Jr.  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      Evaluation Date: 12/7/2021  Plan of Care Certification Period: 12  Visit #/Visits authorized:9 / 12    Precautions: Standard    Subjective     Chief Complaint:  Chronic left hand pain,  Left finger pain (primarily index DIP/PIP joint then middle finger).    Response to Previous Treatment:  Good    Quality of Symptoms (Better/Worse):  Better  Other Condition/Symptoms:  None    Objective      New Findings:  None    Treatment Principles:  relax muscles of the fingers, increase blood circulation to the hand, reduce inflammation in hand joint    Acupuncture Points:   Left:  LI 4,  LI 10 Sole 9 , LI 12, Marie on left hand + 8    NEEDLES W/O STIM  AT: 9:30 AM    NEEDLES W/O STIM REMOVED AT: 10:00 AM    #NEEDLES IN: 12    #NEEDLES OUT: 12    Other Traditional Chinese Medicine Modalities:  None    Recommendations:  PT    Education:  Patient is aware of cumulative effect of acupuncture      Assessment      Analysis of Treatment:  Patient felt good    Pt prognosis is Good.     Patient will continue to benefit from acupuncture treatment to address the deficits listed in the problem list box on initial evaluation, provide patient family education and to maximize pt's level of independence in the home and community environment.     Patient's spiritual, cultural and educational needs considered and pt agreeable to plan of care and goals.     Anticipated barriers to treatment: None    Plan     Recommend     1-2   /week for 12  treatments and re-assess.

## 2022-07-26 ENCOUNTER — CLINICAL SUPPORT (OUTPATIENT)
Dept: REHABILITATION | Facility: HOSPITAL | Age: 72
End: 2022-07-26
Payer: COMMERCIAL

## 2022-07-26 DIAGNOSIS — G57.93 NEUROPATHY OF BOTH FEET: ICD-10-CM

## 2022-07-26 DIAGNOSIS — G89.29 CHRONIC BILATERAL LOW BACK PAIN WITHOUT SCIATICA: Primary | ICD-10-CM

## 2022-07-26 DIAGNOSIS — G57.93 NEUROPATHY INVOLVING BOTH LOWER EXTREMITIES: ICD-10-CM

## 2022-07-26 DIAGNOSIS — M54.50 CHRONIC BILATERAL LOW BACK PAIN WITHOUT SCIATICA: Primary | ICD-10-CM

## 2022-07-26 DIAGNOSIS — M62.569 ATROPHY OF MUSCLE OF LOWER LEG, UNSPECIFIED LATERALITY: ICD-10-CM

## 2022-07-26 PROCEDURE — 97813 ACUP 1/> W/ESTIM 1ST 15 MIN: CPT

## 2022-07-26 PROCEDURE — 97814 ACUP 1/> W/ESTIM EA ADDL 15: CPT

## 2022-07-29 ENCOUNTER — CLINICAL SUPPORT (OUTPATIENT)
Dept: REHABILITATION | Facility: HOSPITAL | Age: 72
End: 2022-07-29
Payer: COMMERCIAL

## 2022-07-29 DIAGNOSIS — M54.50 CHRONIC BILATERAL LOW BACK PAIN WITHOUT SCIATICA: Primary | ICD-10-CM

## 2022-07-29 DIAGNOSIS — G57.93 NEUROPATHY INVOLVING BOTH LOWER EXTREMITIES: ICD-10-CM

## 2022-07-29 DIAGNOSIS — M62.569 ATROPHY OF MUSCLE OF LOWER LEG, UNSPECIFIED LATERALITY: ICD-10-CM

## 2022-07-29 DIAGNOSIS — G89.29 CHRONIC BILATERAL LOW BACK PAIN WITHOUT SCIATICA: Primary | ICD-10-CM

## 2022-07-29 PROCEDURE — 97813 ACUP 1/> W/ESTIM 1ST 15 MIN: CPT

## 2022-07-29 PROCEDURE — 97814 ACUP 1/> W/ESTIM EA ADDL 15: CPT

## 2022-07-29 NOTE — PROGRESS NOTES
Acupuncture Treatment Note     Name: Raffy Rutherford Jr.  Clinic Number: [id number]    Traditional Chinese Medicine Diagnosis: Qi and Blood stagnation in Lower Back, both Lower Legs and Feet  Physician: Kaye Solis MD    Date of Service: 7/29/2022     Medical Diagnosis: Lower Back Pain, Neuropathy and atrophy of Both Lower Legs, Neuropathy on feet  Evaluation Date: 7/26/2022  Plan of Care Certification Period: 12  Visit #/Visits authorized:11 / 12    Precautions: Standard    Subjective     Chief Complaint:  Lower Back Pain, Neuropathy and atrophy of Both Lower Legs, Neuropathy on feet    Response to Previous Treatment:  Good, Inflammation on finger has been reduced.     Quality of Symptoms (Better/Worse):  Better    Other Condition/Symptoms:  Headache    Objective      New Findings:  Pain in forehead    Treatment Principles: Increasing  Qi and Blood  In Forehead,  in Lower Back,  In both Lower Legs,  and in Feet    Acupuncture Points:     Bilateral:   GB 14, ST 36, SP 8,  SP 9, SP 6, SP 3, SP 4, ST 36, ST 38, LV 3, ST 25, KD 12, KD 13    Unilateral: RN 12, RN 6, RN 4, RN 3, Yin Garcia, DU 24    NEEDLES W/ STIM  AT: 9:30 AM    NEEDLES W/ STIM REMOVED AT: 10:00 AM    #NEEDLES IN: 32    #NEEDLES OUT: 32    Other Traditional Chinese Medicine Modalities:  None    Recommendations:  PT    Education:  Patient is aware of cumulative effect of acupuncture      Assessment      Analysis of Treatment:  Patient felt good    Pt prognosis is Good.     Patient will continue to benefit from acupuncture treatment to address the deficits listed in the problem list box on initial evaluation, provide patient family education and to maximize pt's level of independence in the home and community environment.     Patient's spiritual, cultural and educational needs considered and pt agreeable to plan of care and goals.     Anticipated barriers to treatment: None    Plan     Recommend     1-2   /week for 12  treatments and re-assess.

## 2022-08-01 ENCOUNTER — OFFICE VISIT (OUTPATIENT)
Dept: OTOLARYNGOLOGY | Facility: CLINIC | Age: 72
End: 2022-08-01
Payer: COMMERCIAL

## 2022-08-01 VITALS — DIASTOLIC BLOOD PRESSURE: 71 MMHG | SYSTOLIC BLOOD PRESSURE: 117 MMHG | HEART RATE: 64 BPM

## 2022-08-01 DIAGNOSIS — H61.23 IMPACTED CERUMEN, BILATERAL: ICD-10-CM

## 2022-08-01 DIAGNOSIS — Z78.9 HISTORY OF EXCESSIVE CERUMEN: Primary | ICD-10-CM

## 2022-08-01 PROCEDURE — 1159F PR MEDICATION LIST DOCUMENTED IN MEDICAL RECORD: ICD-10-PCS | Mod: CPTII,S$GLB,, | Performed by: OTOLARYNGOLOGY

## 2022-08-01 PROCEDURE — 3078F PR MOST RECENT DIASTOLIC BLOOD PRESSURE < 80 MM HG: ICD-10-PCS | Mod: CPTII,S$GLB,, | Performed by: OTOLARYNGOLOGY

## 2022-08-01 PROCEDURE — 99499 UNLISTED E&M SERVICE: CPT | Mod: S$GLB,,, | Performed by: OTOLARYNGOLOGY

## 2022-08-01 PROCEDURE — 1126F AMNT PAIN NOTED NONE PRSNT: CPT | Mod: CPTII,S$GLB,, | Performed by: OTOLARYNGOLOGY

## 2022-08-01 PROCEDURE — 1160F PR REVIEW ALL MEDS BY PRESCRIBER/CLIN PHARMACIST DOCUMENTED: ICD-10-PCS | Mod: CPTII,S$GLB,, | Performed by: OTOLARYNGOLOGY

## 2022-08-01 PROCEDURE — 3288F PR FALLS RISK ASSESSMENT DOCUMENTED: ICD-10-PCS | Mod: CPTII,S$GLB,, | Performed by: OTOLARYNGOLOGY

## 2022-08-01 PROCEDURE — 1159F MED LIST DOCD IN RCRD: CPT | Mod: CPTII,S$GLB,, | Performed by: OTOLARYNGOLOGY

## 2022-08-01 PROCEDURE — 1157F PR ADVANCE CARE PLAN OR EQUIV PRESENT IN MEDICAL RECORD: ICD-10-PCS | Mod: CPTII,S$GLB,, | Performed by: OTOLARYNGOLOGY

## 2022-08-01 PROCEDURE — 3044F HG A1C LEVEL LT 7.0%: CPT | Mod: CPTII,S$GLB,, | Performed by: OTOLARYNGOLOGY

## 2022-08-01 PROCEDURE — 99999 PR PBB SHADOW E&M-EST. PATIENT-LVL III: CPT | Mod: PBBFAC,,, | Performed by: OTOLARYNGOLOGY

## 2022-08-01 PROCEDURE — 3288F FALL RISK ASSESSMENT DOCD: CPT | Mod: CPTII,S$GLB,, | Performed by: OTOLARYNGOLOGY

## 2022-08-01 PROCEDURE — 99999 PR PBB SHADOW E&M-EST. PATIENT-LVL III: ICD-10-PCS | Mod: PBBFAC,,, | Performed by: OTOLARYNGOLOGY

## 2022-08-01 PROCEDURE — 3044F PR MOST RECENT HEMOGLOBIN A1C LEVEL <7.0%: ICD-10-PCS | Mod: CPTII,S$GLB,, | Performed by: OTOLARYNGOLOGY

## 2022-08-01 PROCEDURE — 99499 NO LOS: ICD-10-PCS | Mod: S$GLB,,, | Performed by: OTOLARYNGOLOGY

## 2022-08-01 PROCEDURE — 3074F SYST BP LT 130 MM HG: CPT | Mod: CPTII,S$GLB,, | Performed by: OTOLARYNGOLOGY

## 2022-08-01 PROCEDURE — 69210 REMOVE IMPACTED EAR WAX UNI: CPT | Mod: S$GLB,,, | Performed by: OTOLARYNGOLOGY

## 2022-08-01 PROCEDURE — 1101F PR PT FALLS ASSESS DOC 0-1 FALLS W/OUT INJ PAST YR: ICD-10-PCS | Mod: CPTII,S$GLB,, | Performed by: OTOLARYNGOLOGY

## 2022-08-01 PROCEDURE — 3074F PR MOST RECENT SYSTOLIC BLOOD PRESSURE < 130 MM HG: ICD-10-PCS | Mod: CPTII,S$GLB,, | Performed by: OTOLARYNGOLOGY

## 2022-08-01 PROCEDURE — 1160F RVW MEDS BY RX/DR IN RCRD: CPT | Mod: CPTII,S$GLB,, | Performed by: OTOLARYNGOLOGY

## 2022-08-01 PROCEDURE — 1101F PT FALLS ASSESS-DOCD LE1/YR: CPT | Mod: CPTII,S$GLB,, | Performed by: OTOLARYNGOLOGY

## 2022-08-01 PROCEDURE — 3078F DIAST BP <80 MM HG: CPT | Mod: CPTII,S$GLB,, | Performed by: OTOLARYNGOLOGY

## 2022-08-01 PROCEDURE — 1157F ADVNC CARE PLAN IN RCRD: CPT | Mod: CPTII,S$GLB,, | Performed by: OTOLARYNGOLOGY

## 2022-08-01 PROCEDURE — 69210 PR REMOVAL IMPACTED CERUMEN REQUIRING INSTRUMENTATION, UNILATERAL: ICD-10-PCS | Mod: S$GLB,,, | Performed by: OTOLARYNGOLOGY

## 2022-08-01 PROCEDURE — 1126F PR PAIN SEVERITY QUANTIFIED, NO PAIN PRESENT: ICD-10-PCS | Mod: CPTII,S$GLB,, | Performed by: OTOLARYNGOLOGY

## 2022-08-01 NOTE — PROGRESS NOTES
CC:  HPI: Mr. Rutherford is an old patient of mine who presents today for ear cleaning procedures.  He is trying to learn the cello.  He is, also,  trying to rehabilitate his left hand after an accidental hand laceration injury as a result of cleaning a wine glass weeks ago.    His ears were last cleaned by me in mid April this year.  Remind me of his history of tinnitus perception in hearing loss.  He is a musician/base player.    Past Medical History:   Diagnosis Date    Allergy     Arthritis     Back pain     after trauma beginning in 195    Cataract     Chronic pain     neck and left shoulder    Cluster headache 2013    Colon polyps 2007 2007-2019: TA x5, HP x3    Degenerative disc disease     Depression     Diverticulitis 12/2013    Fibromyalgia 2013    GERD (gastroesophageal reflux disease)     Hepatitis 1970's    A    History of prostate biopsy 2002    Hyperlipidemia     Joint pain     Prostate cancer 07/2021    Sleep apnea     Thyroid nodule 7/16/2014    Tubular adenoma of colon 2007    5 removed [id number]    Visual disturbance 2012    problems after cataract surgery       PE:Gen.: alert and oriented CM in no acute distress; he is no longer wearing a wrist cast  Both ears are examined under the microscope  Procedures:  Semi occlusive pieces of cerumen are removed from each ear canal with use of a blunt curette.    Both TMs are clear and intact as visualized.      DIAGNOSIS:     ICD-10-CM ICD-9-CM    1. History of excessive cerumen  Z78.9 V49.89    2. Impacted cerumen, bilateral  H61.23 380.4      PLAN: Cerumen impactions removed from both eacs with curette  RTC 3 months for ear cleaning

## 2022-08-02 ENCOUNTER — CLINICAL SUPPORT (OUTPATIENT)
Dept: REHABILITATION | Facility: HOSPITAL | Age: 72
End: 2022-08-02
Payer: COMMERCIAL

## 2022-08-02 DIAGNOSIS — G57.93 NEUROPATHY INVOLVING BOTH LOWER EXTREMITIES: Primary | ICD-10-CM

## 2022-08-02 DIAGNOSIS — G57.93 NEUROPATHY OF BOTH FEET: ICD-10-CM

## 2022-08-02 PROCEDURE — 97813 ACUP 1/> W/ESTIM 1ST 15 MIN: CPT

## 2022-08-02 PROCEDURE — 97814 ACUP 1/> W/ESTIM EA ADDL 15: CPT

## 2022-08-02 NOTE — PROGRESS NOTES
Acupuncture Treatment Note     Name: Raffy Rutherford Jr.  Clinic Number: [id number]    Traditional Chinese Medicine Diagnosis: Qi and Blood stagnation in Lower Back, both Lower Legs and Feet  Physician: Kaye Solis MD    Date of Service: 8/2/2022     Medical Diagnosis:Headache, Neuropathy and atrophy of Both Lower Legs and feet    Evaluation Date: 7/26/2022  Plan of Care Certification Period: 12  Visit #/Visits authorized:12/ 12    Precautions: Standard    Subjective     Chief Complaint:  Neuropathy and atrophy of Both Lower Legs and feet    Response to Previous Treatment:  Good    Quality of Symptoms (Better/Worse):  Better    Other Condition/Symptoms:  Headache    Objective      New Findings:  Headache on forehead    Treatment Principles: Increasing  Qi and Blood  In Forehead,  in Lower Back,  In both Lower Legs,  and in Feet    Acupuncture Points:     Bilateral:   GB 14, ST 36, SP 8,  SP 9, SP 6, SP 3, SP 4, ST 36, ST 38, LV 3,  KD 12, KD 13, UB2, LI 20, Marie on face + 1    Unilateral: RN 12, RN 4, RN 3, Yin Garcia, DU 24    NEEDLES W/ STIM  AT: 1:30 PM    NEEDLES W/ STIM REMOVED AT: 2:10 PM    #NEEDLES IN: 35    #NEEDLES OUT: 35    Other Traditional Chinese Medicine Modalities:  None    Recommendations:  PT    Education:  Patient is aware of cumulative effect of acupuncture      Assessment      Analysis of Treatment:  Patient felt good    Pt prognosis is Good.     Patient will continue to benefit from acupuncture treatment to address the deficits listed in the problem list box on initial evaluation, provide patient family education and to maximize pt's level of independence in the home and community environment.     Patient's spiritual, cultural and educational needs considered and pt agreeable to plan of care and goals.     Anticipated barriers to treatment: None    Plan     Recommend     1-2   /week for 12  treatments and re-assess.

## 2022-08-04 ENCOUNTER — PATIENT MESSAGE (OUTPATIENT)
Dept: INTERNAL MEDICINE | Facility: CLINIC | Age: 72
End: 2022-08-04
Payer: COMMERCIAL

## 2022-08-04 DIAGNOSIS — M54.50 CHRONIC LOW BACK PAIN, UNSPECIFIED BACK PAIN LATERALITY, UNSPECIFIED WHETHER SCIATICA PRESENT: Primary | ICD-10-CM

## 2022-08-04 DIAGNOSIS — G89.29 CHRONIC LOW BACK PAIN, UNSPECIFIED BACK PAIN LATERALITY, UNSPECIFIED WHETHER SCIATICA PRESENT: Primary | ICD-10-CM

## 2022-08-05 ENCOUNTER — CLINICAL SUPPORT (OUTPATIENT)
Dept: REHABILITATION | Facility: HOSPITAL | Age: 72
End: 2022-08-05
Payer: COMMERCIAL

## 2022-08-05 DIAGNOSIS — G57.93 NEUROPATHY OF BOTH FEET: ICD-10-CM

## 2022-08-05 DIAGNOSIS — G57.93 NEUROPATHY INVOLVING BOTH LOWER EXTREMITIES: Primary | ICD-10-CM

## 2022-08-05 PROCEDURE — 97814 ACUP 1/> W/ESTIM EA ADDL 15: CPT

## 2022-08-05 PROCEDURE — 97813 ACUP 1/> W/ESTIM 1ST 15 MIN: CPT

## 2022-08-05 NOTE — PROGRESS NOTES
Acupuncture Treatment Note     Name: Raffy Rutherford Jr.  Clinic Number: [id number]    Traditional Chinese Medicine Diagnosis: Qi and Blood stagnation in  both Lower Legs and Feet  Physician: Kaye Solis MD    Date of Service: 8/5/2022     Medical Diagnosis:Neuropathy and atrophy of Both Lower Legs and feet    Evaluation Date: 7/26/2022  Plan of Care Certification Period: 12  Visit #/Visits authorized:12/ 12    Precautions: Standard    Subjective     Chief Complaint:  Neuropathy and atrophy of Both Lower Legs and feet    Response to Previous Treatment:  Good    Quality of Symptoms (Better/Worse):  Better    Other Condition/Symptoms:  Tinnitus (Right)    Objective      New Findings:  Tinnitus (Right)    Treatment Principles: Increasing  Qi and Blood in both Lower Legs,  and in Feet    Acupuncture Points:     Bilateral:   ST 36, SP 8,  SP 9, SP 6, SP 3, SP 4, ST 36, ST 38, LV 3,  KD 12, KD 13,    Unilateral: RN 12, RN 4, RN 3, Yin Garcia, DU 24    Right: GB 2, SJ 21, SI 19    NEEDLES W/ STIM  AT: 9:30 AM    NEEDLES W/ STIM REMOVED AT: 10:10 AM    #NEEDLES IN: 30    #NEEDLES OUT: 30    Other Traditional Chinese Medicine Modalities:  None    Recommendations:  PT    Education:  Patient is aware of cumulative effect of acupuncture      Assessment      Analysis of Treatment:  Patient felt good    Pt prognosis is Good.     Patient will continue to benefit from acupuncture treatment to address the deficits listed in the problem list box on initial evaluation, provide patient family education and to maximize pt's level of independence in the home and community environment.     Patient's spiritual, cultural and educational needs considered and pt agreeable to plan of care and goals.     Anticipated barriers to treatment: None    Plan     Recommend     1-2   /week for 12  treatments and re-assess.

## 2022-08-09 ENCOUNTER — CLINICAL SUPPORT (OUTPATIENT)
Dept: REHABILITATION | Facility: HOSPITAL | Age: 72
End: 2022-08-09
Payer: COMMERCIAL

## 2022-08-09 ENCOUNTER — OFFICE VISIT (OUTPATIENT)
Dept: PHYSICAL MEDICINE AND REHAB | Facility: CLINIC | Age: 72
End: 2022-08-09
Payer: COMMERCIAL

## 2022-08-09 VITALS
DIASTOLIC BLOOD PRESSURE: 69 MMHG | BODY MASS INDEX: 27.74 KG/M2 | SYSTOLIC BLOOD PRESSURE: 113 MMHG | WEIGHT: 183.06 LBS | HEIGHT: 68 IN | HEART RATE: 56 BPM

## 2022-08-09 DIAGNOSIS — H93.11 TINNITUS OF RIGHT EAR: ICD-10-CM

## 2022-08-09 DIAGNOSIS — G60.9 IDIOPATHIC PERIPHERAL NEUROPATHY: ICD-10-CM

## 2022-08-09 DIAGNOSIS — M54.42 CHRONIC BILATERAL LOW BACK PAIN WITH BILATERAL SCIATICA: Primary | ICD-10-CM

## 2022-08-09 DIAGNOSIS — R26.9 IMPAIRED GAIT: ICD-10-CM

## 2022-08-09 DIAGNOSIS — G57.93 NEUROPATHY INVOLVING BOTH LOWER EXTREMITIES: ICD-10-CM

## 2022-08-09 DIAGNOSIS — M21.371 BILATERAL FOOT-DROP: ICD-10-CM

## 2022-08-09 DIAGNOSIS — M21.372 BILATERAL FOOT-DROP: ICD-10-CM

## 2022-08-09 DIAGNOSIS — G89.29 CHRONIC BILATERAL LOW BACK PAIN WITH BILATERAL SCIATICA: Primary | ICD-10-CM

## 2022-08-09 DIAGNOSIS — M77.8 TENDONITIS OF FINGER: ICD-10-CM

## 2022-08-09 DIAGNOSIS — Z98.1 STATUS POST LUMBAR SPINAL FUSION: ICD-10-CM

## 2022-08-09 DIAGNOSIS — M65.322 TRIGGER INDEX FINGER OF LEFT HAND: Primary | ICD-10-CM

## 2022-08-09 DIAGNOSIS — M96.1 POST LAMINECTOMY SYNDROME: ICD-10-CM

## 2022-08-09 DIAGNOSIS — M54.41 CHRONIC BILATERAL LOW BACK PAIN WITH BILATERAL SCIATICA: Primary | ICD-10-CM

## 2022-08-09 PROCEDURE — 97814 ACUP 1/> W/ESTIM EA ADDL 15: CPT

## 2022-08-09 PROCEDURE — 1157F PR ADVANCE CARE PLAN OR EQUIV PRESENT IN MEDICAL RECORD: ICD-10-PCS | Mod: CPTII,S$GLB,, | Performed by: PHYSICAL MEDICINE & REHABILITATION

## 2022-08-09 PROCEDURE — 3074F SYST BP LT 130 MM HG: CPT | Mod: CPTII,S$GLB,, | Performed by: PHYSICAL MEDICINE & REHABILITATION

## 2022-08-09 PROCEDURE — 97813 ACUP 1/> W/ESTIM 1ST 15 MIN: CPT

## 2022-08-09 PROCEDURE — 1125F AMNT PAIN NOTED PAIN PRSNT: CPT | Mod: CPTII,S$GLB,, | Performed by: PHYSICAL MEDICINE & REHABILITATION

## 2022-08-09 PROCEDURE — 3008F BODY MASS INDEX DOCD: CPT | Mod: CPTII,S$GLB,, | Performed by: PHYSICAL MEDICINE & REHABILITATION

## 2022-08-09 PROCEDURE — 3008F PR BODY MASS INDEX (BMI) DOCUMENTED: ICD-10-PCS | Mod: CPTII,S$GLB,, | Performed by: PHYSICAL MEDICINE & REHABILITATION

## 2022-08-09 PROCEDURE — 1125F PR PAIN SEVERITY QUANTIFIED, PAIN PRESENT: ICD-10-PCS | Mod: CPTII,S$GLB,, | Performed by: PHYSICAL MEDICINE & REHABILITATION

## 2022-08-09 PROCEDURE — 99999 PR PBB SHADOW E&M-EST. PATIENT-LVL III: CPT | Mod: PBBFAC,,, | Performed by: PHYSICAL MEDICINE & REHABILITATION

## 2022-08-09 PROCEDURE — 99215 OFFICE O/P EST HI 40 MIN: CPT | Mod: S$GLB,,, | Performed by: PHYSICAL MEDICINE & REHABILITATION

## 2022-08-09 PROCEDURE — 3044F PR MOST RECENT HEMOGLOBIN A1C LEVEL <7.0%: ICD-10-PCS | Mod: CPTII,S$GLB,, | Performed by: PHYSICAL MEDICINE & REHABILITATION

## 2022-08-09 PROCEDURE — 1157F ADVNC CARE PLAN IN RCRD: CPT | Mod: CPTII,S$GLB,, | Performed by: PHYSICAL MEDICINE & REHABILITATION

## 2022-08-09 PROCEDURE — 99215 PR OFFICE/OUTPT VISIT, EST, LEVL V, 40-54 MIN: ICD-10-PCS | Mod: S$GLB,,, | Performed by: PHYSICAL MEDICINE & REHABILITATION

## 2022-08-09 PROCEDURE — 99999 PR PBB SHADOW E&M-EST. PATIENT-LVL III: ICD-10-PCS | Mod: PBBFAC,,, | Performed by: PHYSICAL MEDICINE & REHABILITATION

## 2022-08-09 PROCEDURE — 3078F PR MOST RECENT DIASTOLIC BLOOD PRESSURE < 80 MM HG: ICD-10-PCS | Mod: CPTII,S$GLB,, | Performed by: PHYSICAL MEDICINE & REHABILITATION

## 2022-08-09 PROCEDURE — 3074F PR MOST RECENT SYSTOLIC BLOOD PRESSURE < 130 MM HG: ICD-10-PCS | Mod: CPTII,S$GLB,, | Performed by: PHYSICAL MEDICINE & REHABILITATION

## 2022-08-09 PROCEDURE — 3078F DIAST BP <80 MM HG: CPT | Mod: CPTII,S$GLB,, | Performed by: PHYSICAL MEDICINE & REHABILITATION

## 2022-08-09 PROCEDURE — 3044F HG A1C LEVEL LT 7.0%: CPT | Mod: CPTII,S$GLB,, | Performed by: PHYSICAL MEDICINE & REHABILITATION

## 2022-08-09 NOTE — PROGRESS NOTES
Subjective:       Patient ID: Raffy Rutherford Jr. is a 72 y.o. male.    Chief Complaint: No chief complaint on file.      HPI      Mr. Rutherford is a 72-year-old male with past medical history of osteoarthritis, GERD, depression, idiopathic peripheral neuropathy, chronic low back pain with radiculopathy status post multiple back surgeries.  He is coming to the clinic for assessment and possible management of peripheral neuropathy and bilateral foot drop.    The patient has been complaining of low back pain for several years.  He was diagnosed with DJD of the lumbar spine.  He underwent a total of 4 back surgeries.  At [facility], he is status post L3-4 and L4-5 XLIF by Dr. Browne on 06/22/2015.  He is status post L5-S1 laminotomy and microdiskectomy on 10/26/2019 and L5-S1 anterior fusion on 08/20/2020 by Dr. Caldwell.  He had persistent back pain and radicular symptoms after his surgeries. He is followed up by Dr. Puma Olivarez at a [facility] outside [facility] for his back pain.  He is maintained on pregabalin (25 mg capsules, 1 capsule in the morning and afternoon, and 75 mg capsules, 2 capsules at bedtime).  He is also on p.r.n. hydrocodone/APAP 5/325, usually half a tablet 3 times per day.  His back pain has been under fair control.  He has occasional bilateral radicular symptoms.      He continues to experience neuropathic pain in both feet.  He has chronic bilateral footdrop, worse on the right.  He had an extensive workup in the past by Neurology at [facility] and at [facility].  He had several EMG/NCS studies over the last 10 years.  At some point there was a suspicion of Charcot-Selena-Tooth but adequate genetic counseling could not be done due to inacessability of siblings.  His last electrodiagnostic test was done in 03/2022 by Dr. Millan at [facility] and mostly showed bilateral chronic L5-S1 radiculopathy worse on the right, with note that superimposed chronic motor  axonal neuropathy could cause similar changes.     The patient has been ambulating with bilateral AFOs.  He reports he purchased them on his own.  They help with his gait stability.  He started to get some discomfort in his plantar areas.  He also reports getting courses of physical therapy.  They reportedly did not focus enough on balance training and strengthening of dorsiflexors.    Past Medical History:   Diagnosis Date    Allergy     Arthritis     Back pain     after trauma beginning in 195    Cataract     Chronic pain     neck and left shoulder    Cluster headache 2013    Colon polyps 2007 2007-2019: TA x5, HP x3    Degenerative disc disease     Depression     Diverticulitis 12/2013    Fibromyalgia 2013    GERD (gastroesophageal reflux disease)     Hepatitis 1970's    A    History of prostate biopsy 2002    Hyperlipidemia     Joint pain     Prostate cancer 07/2021    Sleep apnea     Thyroid nodule 7/16/2014    Tubular adenoma of colon 2007    5 removed [id number]    Visual disturbance 2012    problems after cataract surgery        Review of patient's allergies indicates:   Allergen Reactions    Alphagan [brimonidine]      Patient taking MASO-B Selective Inhibitor Selegiline (Emsam)    Coumadin [warfarin]      itch    Oxycodone      hiccups        Review of Systems   Constitutional: Negative for chills, fatigue and fever.   Eyes: Negative for visual disturbance.   Respiratory: Negative for shortness of breath.    Cardiovascular: Negative for chest pain.   Gastrointestinal: Negative for blood in stool, nausea and vomiting.   Genitourinary: Negative for difficulty urinating.   Musculoskeletal: Negative for arthralgias, back pain, gait problem and neck pain.   Neurological: Negative for dizziness, weakness and headaches.   Psychiatric/Behavioral: Negative for behavioral problems and sleep disturbance.             Objective:      Physical Exam  Vitals reviewed.   Constitutional:        General: He is not in acute distress.     Appearance: He is well-developed.   HENT:      Head: Normocephalic and atraumatic.   Musculoskeletal:      Cervical back: Normal range of motion.      Comments: BUE:  ROM:   RUE: full.   LUE: full. Mild atrophy of first dorsal interosseus muscles.  Strength:    RUE: 5/5 at shoulder abduction, 5 elbow flexion, 5 wrist extension, 5 elbow extension, 5 finger flexion, 5 finger abduction.   LUE: 5/5 at shoulder abduction, 5 elbow flexion, 5 wrist extension, 5 elbow extension, 5 finger flexion, 5 finger abduction.  Sensation to pinprick:   RUE: intact.   LUE: intact.  DTR:    RUE: +1 biceps, +1 triceps.   LUE:  +1 biceps, +1 triceps.      BLE:  ROM:   RLE: full.   LLE: full.  Knee crepitus:   RLE: -ve.   LLE: -ve.   Strength:    RLE: 5/5 at hip flexion, 5 knee extension, 0 ankle DF, 0 PF, 2 EHL.   LLE: 5/5 at hip flexion, 5 knee extension, 3 ankle DF, 3 PF, 4 EHL.  Sensation to pinprick:     RLE: decreased distally.      LLE: decreased distally.   DTR:     RLE: +1 knee, +0 ankle.    LLE: +1 knee, +0 ankle.  Clonus:    Rt ankle: -ve.    Lt ankle: -ve.  SLR (sitting):      RLE: -ve.      LLE: -ve.    +ve mild tenderness over lumbar spine.    Gait: WNL, using bilateral AFOs     Skin:     General: Skin is warm.   Neurological:      Mental Status: He is alert.   Psychiatric:         Behavior: Behavior normal.         Assessment:       1. Chronic bilateral low back pain with bilateral sciatica    2. Status post lumbar spinal fusion (L5-S1 in 8/2020, XLIF at L3-4, L4-5 in 6/2015)    3. Post laminectomy syndrome    4. Idiopathic peripheral neuropathy    5. Bilateral foot-drop    6. Impaired gait        Plan:       - The patient is already under the care of a Pain Clinic prescribing his opiates and pregabalin for back pain with radiculopathy and for peripheral neuropathic pain  - He is still interested in adequate workup, diagnosis and management of his peripheral neuropathy.  I told him I  [facility] take over the care from Dr. Millan (since she moved out of town).  - He has been using his bilateral AFOs for few years.  They are likely worn out.  A new prescription for bilateral ankle-foot orthoses with dorsiflex assist was generated.  He was given contact information for couple orthotic shops in the area ([employer] [facility] and [employer]) to fabricate new AFOs.  - He may return to the [facility] as needed.        This was a 60 minute visit including review of records.  About 50% of the visit was spent educating the patient about the diagnosis and the treatment plan.    This note was partly generated with [employer] voice recognition software. I apologize for any possible typographical errors.

## 2022-08-09 NOTE — TELEPHONE ENCOUNTER
LVM for the patient. Informed of X-rays scheduled prior to appointment.     Danisha Shah MS, [facility]  Orthopedic Clinical Assistant to Dr. Ross Dunbar [facility]       Xolair Pregnancy And Lactation Text: This medication is Pregnancy Category B and is considered safe during pregnancy. This medication is excreted in breast milk.

## 2022-08-09 NOTE — PROGRESS NOTES
Acupuncture Treatment Note     Name: Raffy Rutherford Jr.  Clinic Number: [id number]    Traditional Chinese Medicine Diagnosis: Qi and Blood stagnation in  both Lower Legs and Feet  Physician: Kaye Solis MD    Date of Service: 8/9/2022     Medical Diagnosis:Neuropathy and atrophy of Both Lower Legs and feet    Evaluation Date: 7/26/2022  Plan of Care Certification Period: 12  Visit #/Visits authorized:13    Precautions: Standard    Subjective     Chief Complaint:  Neuropathy and atrophy of Both Lower Legs and feet    Response to Previous Treatment:  Good    Quality of Symptoms (Better/Worse):  Better    Other Condition/Symptoms:  Tinnitus (Right), Left finger pain (primarily index DIP/PIP joint then middle finger).       Objective      New Findings:  Tinnitus (Right), Left finger pain (primarily index DIP/PIP joint then middle finger).       Treatment Principles: Increasing  Qi and Blood in both Lower Legs,  and in Feet    Acupuncture Points:     Bilateral:   ST 36, SP 8,  SP 9, SP 6, SP 3, SP 4, ST 36, ST 38, LV 3,  KD 12, KD 13, ST 28, ST 29    Unilateral: RN 12, RN 4, RN 3,     Right: GB 2, SJ 21, SI 19    Left:  LI 4,  LI 10 Sole 9 , LI 12, Marie on left hand + 3    NEEDLES W/ STIM  AT: 3:30 PM    NEEDLES W/ STIM REMOVED AT: 4:30 PM    #NEEDLES IN: 39    #NEEDLES OUT: 39    Other Traditional Chinese Medicine Modalities:  None    Recommendations:  PT    Education:  Patient is aware of cumulative effect of acupuncture      Assessment      Analysis of Treatment:  Patient felt good    Pt prognosis is Good.     Patient will continue to benefit from acupuncture treatment to address the deficits listed in the problem list box on initial evaluation, provide patient family education and to maximize pt's level of independence in the home and community environment.     Patient's spiritual, cultural and educational needs considered and pt agreeable to plan of care and goals.     Anticipated barriers to treatment:  None    Plan     Recommend     1-2   /week for 12  treatments and re-assess.

## 2022-08-11 ENCOUNTER — TELEPHONE (OUTPATIENT)
Dept: PHARMACY | Facility: CLINIC | Age: 72
End: 2022-08-11
Payer: COMMERCIAL

## 2022-08-11 ENCOUNTER — CLINICAL SUPPORT (OUTPATIENT)
Dept: REHABILITATION | Facility: HOSPITAL | Age: 72
End: 2022-08-11
Attending: INTERNAL MEDICINE
Payer: COMMERCIAL

## 2022-08-11 DIAGNOSIS — G89.29 CHRONIC LOW BACK PAIN, UNSPECIFIED BACK PAIN LATERALITY, UNSPECIFIED WHETHER SCIATICA PRESENT: ICD-10-CM

## 2022-08-11 DIAGNOSIS — M54.50 CHRONIC LOW BACK PAIN, UNSPECIFIED BACK PAIN LATERALITY, UNSPECIFIED WHETHER SCIATICA PRESENT: ICD-10-CM

## 2022-08-11 DIAGNOSIS — M62.569 ATROPHY OF MUSCLE OF LOWER LEG, UNSPECIFIED LATERALITY: ICD-10-CM

## 2022-08-11 DIAGNOSIS — M77.8 TENDONITIS OF FINGER: Primary | ICD-10-CM

## 2022-08-11 PROCEDURE — 97813 ACUP 1/> W/ESTIM 1ST 15 MIN: CPT

## 2022-08-11 PROCEDURE — 97814 ACUP 1/> W/ESTIM EA ADDL 15: CPT

## 2022-08-11 NOTE — PROGRESS NOTES
Acupuncture Treatment Note     Name: Raffy Rutherford Jr.  Clinic Number: [id number]    Traditional Chinese Medicine Diagnosis: Qi and Blood stagnation in  both Lower Legs and Feet  Physician: Haseeb Puentes MD    Date of Service: 8/11/2022     Medical Diagnosis:Neuropathy and atrophy of Both Lower Legs and feet    Evaluation Date: 7/26/2022  Plan of Care Certification Period: 12  Visit #/Visits authorized:14    Precautions: Standard    Subjective     Chief Complaint:  Neuropathy and atrophy of Both Lower Legs and feet    Response to Previous Treatment:  Good    Quality of Symptoms (Better/Worse):  Better    Other Condition/Symptoms:  Left finger pain (primarily index DIP/PIP joint then middle finger).       Objective      New Findings:   Left finger pain (primarily index DIP/PIP joint then middle finger).       Treatment Principles: Increasing  Qi and Blood in both Lower Legs,  and in Feet    Acupuncture Points:     Bilateral:   SP 8,  SP 9, SP 6, SP 3, SP 4, ST 36, ST 38, LV 3,  KD 12, KD 13, ST 28, ST 29    Unilateral: RN 12, RN 4, RN 3,  RN 14, DU 24    Left:  LI 4,  Sole 9 ,  Marie on left hand + 3    NEEDLES W/ STIM  AT: 2:00 PM    NEEDLES W/ STIM REMOVED AT: 2:55 PM    #NEEDLES IN: 34    #NEEDLES OUT: 34    Other Traditional Chinese Medicine Modalities:  None    Recommendations:  PT    Education:  Patient is aware of cumulative effect of acupuncture      Assessment      Analysis of Treatment:  Patient felt good    Pt prognosis is Good.     Patient will continue to benefit from acupuncture treatment to address the deficits listed in the problem list box on initial evaluation, provide patient family education and to maximize pt's level of independence in the home and community environment.     Patient's spiritual, cultural and educational needs considered and pt agreeable to plan of care and goals.     Anticipated barriers to treatment: None    Plan     Recommend     1-2   /week for 12  treatments and re-assess.

## 2022-08-12 ENCOUNTER — PATIENT MESSAGE (OUTPATIENT)
Dept: INTERNAL MEDICINE | Facility: CLINIC | Age: 72
End: 2022-08-12
Payer: COMMERCIAL

## 2022-08-12 DIAGNOSIS — G60.0 CMT (CHARCOT-MARIE-TOOTH DISEASE): ICD-10-CM

## 2022-08-12 DIAGNOSIS — G89.29 CHRONIC LOW BACK PAIN, UNSPECIFIED BACK PAIN LATERALITY, UNSPECIFIED WHETHER SCIATICA PRESENT: Primary | ICD-10-CM

## 2022-08-12 DIAGNOSIS — M54.50 CHRONIC LOW BACK PAIN, UNSPECIFIED BACK PAIN LATERALITY, UNSPECIFIED WHETHER SCIATICA PRESENT: Primary | ICD-10-CM

## 2022-08-15 ENCOUNTER — CLINICAL SUPPORT (OUTPATIENT)
Dept: REHABILITATION | Facility: HOSPITAL | Age: 72
End: 2022-08-15
Payer: COMMERCIAL

## 2022-08-15 DIAGNOSIS — M62.569 ATROPHY OF MUSCLE OF LOWER LEG, UNSPECIFIED LATERALITY: ICD-10-CM

## 2022-08-15 DIAGNOSIS — M77.8 TENDONITIS OF FINGER: ICD-10-CM

## 2022-08-15 DIAGNOSIS — G89.29 CHRONIC LOW BACK PAIN, UNSPECIFIED BACK PAIN LATERALITY, UNSPECIFIED WHETHER SCIATICA PRESENT: Primary | ICD-10-CM

## 2022-08-15 DIAGNOSIS — M54.50 CHRONIC LOW BACK PAIN, UNSPECIFIED BACK PAIN LATERALITY, UNSPECIFIED WHETHER SCIATICA PRESENT: Primary | ICD-10-CM

## 2022-08-15 PROCEDURE — 97813 ACUP 1/> W/ESTIM 1ST 15 MIN: CPT | Mod: PN

## 2022-08-15 PROCEDURE — 97814 ACUP 1/> W/ESTIM EA ADDL 15: CPT | Mod: PN

## 2022-08-15 NOTE — PROGRESS NOTES
Acupuncture Treatment Note     Name: Raffy Rutherford Jr.  Clinic Number: [id number]    Traditional Chinese Medicine Diagnosis: Qi and Blood stagnation in  both Lower Legs and Feet  Physician: Haseeb Puentes MD    Date of Service: 8/15/2022     Medical Diagnosis:Neuropathy and atrophy of Both Lower Legs and feet    Evaluation Date: 7/26/2022  Plan of Care Certification Period: 12  Visit #/Visits authorized:15    Precautions: Standard    Subjective     Chief Complaint:  Neuropathy and atrophy of Both Lower Legs and feet    Response to Previous Treatment:  Good    Quality of Symptoms (Better/Worse):  Better    Other Condition/Symptoms:  Left finger pain (primarily index DIP/PIP joint then middle finger).       Objective      New Findings:   Left finger pain (primarily index DIP/PIP joint then middle finger).       Treatment Principles: Increasing  Qi and Blood in both Lower Legs,  and in Feet    Acupuncture Points:     Bilateral:   ST 36, SP 8,  SP 9, SP 6, ST 40, LV 3,  KD 12, KD 13, ST 28, ST 29    Unilateral: RN 12, RN 4, RN 3, RN 6, RN 14    Left:  LI 4,  LI 10 Sole 9 , LI 12, Marie on left hand + 3    Right: LI 4, Torrey on right hand + 3    NEEDLES W/ STIM  AT: 9:10 AM    NEEDLES W/ STIM REMOVED AT: 9:55 AM    #NEEDLES IN: 36    #NEEDLES OUT: 36    Other Traditional Chinese Medicine Modalities:  None    Recommendations:  PT    Education:  Patient is aware of cumulative effect of acupuncture      Assessment      Analysis of Treatment:  Patient felt good    Pt prognosis is Good.     Patient will continue to benefit from acupuncture treatment to address the deficits listed in the problem list box on initial evaluation, provide patient family education and to maximize pt's level of independence in the home and community environment.     Patient's spiritual, cultural and educational needs considered and pt agreeable to plan of care and goals.     Anticipated barriers to treatment: None    Plan     Recommend     1-2    /week for 12  treatments and re-assess.

## 2022-08-19 ENCOUNTER — PATIENT MESSAGE (OUTPATIENT)
Dept: INTERNAL MEDICINE | Facility: CLINIC | Age: 72
End: 2022-08-19
Payer: COMMERCIAL

## 2022-08-24 ENCOUNTER — CLINICAL SUPPORT (OUTPATIENT)
Dept: REHABILITATION | Facility: HOSPITAL | Age: 72
End: 2022-08-24
Payer: MEDICARE

## 2022-08-24 DIAGNOSIS — G89.29 CHRONIC BILATERAL LOW BACK PAIN WITHOUT SCIATICA: Primary | ICD-10-CM

## 2022-08-24 DIAGNOSIS — G57.93 NEUROPATHY INVOLVING BOTH LOWER EXTREMITIES: ICD-10-CM

## 2022-08-24 DIAGNOSIS — M54.50 CHRONIC BILATERAL LOW BACK PAIN WITHOUT SCIATICA: Primary | ICD-10-CM

## 2022-08-24 PROCEDURE — 97814 ACUP 1/> W/ESTIM EA ADDL 15: CPT | Mod: PN

## 2022-08-24 PROCEDURE — 97813 ACUP 1/> W/ESTIM 1ST 15 MIN: CPT | Mod: PN

## 2022-08-24 NOTE — PROGRESS NOTES
Acupuncture Treatment Note     Name: Raffy Rutherford Jr.  Clinic Number: [id number]    Traditional Chinese Medicine Diagnosis: Qi and Blood stagnation in Lower Back,  in  both Lower Legs and in both Feet  Physician: Haseeb Puentes MD    Date of Service: 8/24/2022     Medical Diagnosis: Lower Back Pain.  Neuropathy and atrophy of Both Lower Legs and feet    Evaluation Date: 7/26/2022  Plan of Care Certification Period: 12  Visit #/Visits authorized:16    Precautions: Standard    Subjective     Chief Complaint:  Lower Back Pain. Neuropathy and atrophy of Both Lower Legs and feet    Response to Previous Treatment:  Good    Quality of Symptoms (Better/Worse):  Better    Other Condition/Symptoms:  None      Objective      New Findings:   None    Treatment Principles: Increasing  Qi and Blood in Lower Back, in both Lower Legs,  and in Feet    Acupuncture Points:     Bilateral:   UB 25, Brad Levi, Marie on Lower Back + 3, ST 36, SP 8,  SP 9, SP 6, ST 40, LV 3,  KD 12, KD 13, ST 28, ST 29, LI 4    Unilateral: RN 12, RN 4, RN 3, RN 6, Yin Garcia, DU 24    NEEDLES W/ STIM  AT: 10:40 AM    NEEDLES W/ STIM REMOVED AT: 11:15 AM    #NEEDLES IN: 38    #NEEDLES OUT: 38    Other Traditional Chinese Medicine Modalities:  None    Recommendations:  PT    Education:  Patient is aware of cumulative effect of acupuncture      Assessment      Analysis of Treatment:  Patient felt good    Pt prognosis is Good.     Patient will continue to benefit from acupuncture treatment to address the deficits listed in the problem list box on initial evaluation, provide patient family education and to maximize pt's level of independence in the home and community environment.     Patient's spiritual, cultural and educational needs considered and pt agreeable to plan of care and goals.     Anticipated barriers to treatment: None    Plan     Recommend     1-2   /week for 12  treatments and re-assess.

## 2022-08-30 ENCOUNTER — CLINICAL SUPPORT (OUTPATIENT)
Dept: REHABILITATION | Facility: HOSPITAL | Age: 72
End: 2022-08-30
Payer: COMMERCIAL

## 2022-08-30 DIAGNOSIS — M54.50 CHRONIC BILATERAL LOW BACK PAIN WITHOUT SCIATICA: Primary | ICD-10-CM

## 2022-08-30 DIAGNOSIS — G57.93 NEUROPATHY INVOLVING BOTH LOWER EXTREMITIES: ICD-10-CM

## 2022-08-30 DIAGNOSIS — M62.569 ATROPHY OF MUSCLE OF LOWER LEG, UNSPECIFIED LATERALITY: ICD-10-CM

## 2022-08-30 DIAGNOSIS — G89.29 CHRONIC BILATERAL LOW BACK PAIN WITHOUT SCIATICA: Primary | ICD-10-CM

## 2022-08-30 DIAGNOSIS — M77.8 TENDONITIS OF FINGER: ICD-10-CM

## 2022-08-30 PROCEDURE — 97814 ACUP 1/> W/ESTIM EA ADDL 15: CPT

## 2022-08-30 PROCEDURE — 97813 ACUP 1/> W/ESTIM 1ST 15 MIN: CPT

## 2022-08-30 NOTE — PROGRESS NOTES
Acupuncture Treatment Note     Name: Raffy Rutherford Jr.  Clinic Number: [id number]    Traditional Chinese Medicine Diagnosis: Qi and Blood stagnation in  Lower Back, both Lower Legs and Feet  Physician: Haseeb Puentes MD    Date of Service: 8/30/2022     Medical Diagnosis:Lower Back Pain, Neuropathy and atrophy of Both Lower Legs and feet    Evaluation Date: 7/26/2022  Plan of Care Certification Period: 12  Visit #/Visits authorized:17    Precautions: Standard    Subjective     Chief Complaint:  Lower Back Pain, Neuropathy and atrophy of Both Lower Legs and feet    Response to Previous Treatment:  Good    Quality of Symptoms (Better/Worse):  Better    Other Condition/Symptoms:  Left finger pain (primarily index DIP/PIP joint then middle finger).       Objective      New Findings:   Left finger pain (primarily index DIP/PIP joint then middle finger).       Treatment Principles: Increasing  Qi and Blood in Lower Back, in both Lower Legs,  and in Feet    Acupuncture Points:     Bilateral:   SP 8,  SP 9, SP 6,  ST 36, ST 38, LV 3,  KD 12, KD 13, ST 28, ST 29, UB 25, Brad Levi, Marie on lower back + 5    Unilateral: RN 12, RN 4, RN 3,  RN 14, , RN 14    Left:  LI 4,  Sole 9 ,  Marie on left hand + 3    Right: GB 2, SJ 21    NEEDLES W/ STIM  AT: 9:50 AM    NEEDLES W/ STIM REMOVED AT: 10:40 AM    #NEEDLES IN: 46    #NEEDLES OUT: 46    Other Traditional Chinese Medicine Modalities:  None    Recommendations:  PT    Education:  Patient is aware of cumulative effect of acupuncture      Assessment      Analysis of Treatment:  Patient felt good    Pt prognosis is Good.     Patient will continue to benefit from acupuncture treatment to address the deficits listed in the problem list box on initial evaluation, provide patient family education and to maximize pt's level of independence in the home and community environment.     Patient's spiritual, cultural and educational needs considered and pt agreeable to plan of care and  goals.     Anticipated barriers to treatment: None    Plan     Recommend     1-2   /week for 12  treatments and re-assess.

## 2022-08-31 ENCOUNTER — PATIENT MESSAGE (OUTPATIENT)
Dept: OPTOMETRY | Facility: CLINIC | Age: 72
End: 2022-08-31
Payer: COMMERCIAL

## 2022-08-31 ENCOUNTER — PATIENT MESSAGE (OUTPATIENT)
Dept: OPHTHALMOLOGY | Facility: CLINIC | Age: 72
End: 2022-08-31
Payer: COMMERCIAL

## 2022-09-06 ENCOUNTER — CLINICAL SUPPORT (OUTPATIENT)
Dept: REHABILITATION | Facility: HOSPITAL | Age: 72
End: 2022-09-06
Payer: MEDICARE

## 2022-09-06 DIAGNOSIS — G89.29 CHRONIC BILATERAL LOW BACK PAIN WITHOUT SCIATICA: Primary | ICD-10-CM

## 2022-09-06 DIAGNOSIS — M54.50 CHRONIC BILATERAL LOW BACK PAIN WITHOUT SCIATICA: Primary | ICD-10-CM

## 2022-09-06 DIAGNOSIS — G57.93 NEUROPATHY INVOLVING BOTH LOWER EXTREMITIES: ICD-10-CM

## 2022-09-06 PROCEDURE — 97810 ACUP 1/> WO ESTIM 1ST 15 MIN: CPT

## 2022-09-06 PROCEDURE — 97811 ACUP 1/> W/O ESTIM EA ADD 15: CPT

## 2022-09-06 NOTE — PROGRESS NOTES
Acupuncture Treatment Note     Name: Raffy Rutherford Jr.  Clinic Number: [id number]    Traditional Chinese Medicine Diagnosis: Qi and Blood stagnation in  Lower Back, both Lower Legs and Feet  Physician: Haseeb Puentes MD    Date of Service: 9/6/2022     Medical Diagnosis:Lower Back Pain, Neuropathy and atrophy of Both Lower Legs and feet    Evaluation Date: 7/26/2022  Plan of Care Certification Period: 12  Visit #/Visits authorized:18    Precautions: Standard    Subjective     Chief Complaint:  Lower Back Pain, Neuropathy and atrophy of Both Lower Legs and feet    Response to Previous Treatment:  Good    Quality of Symptoms (Better/Worse):  Better    Other Condition/Symptoms:  Left finger pain (primarily index DIP/PIP joint then middle finger).       Objective      New Findings:   None    Treatment Principles: Increasing  Qi and Blood in Lower Back, in both Lower Legs,  and in Feet    Acupuncture Points:     Bilateral:   SP 8,  SP 9, SP 6,  ST 36, ST 38, LV 3,  KD 12, KD 13, ST 28, ST 29, UB 25, Brad Levi, Marie on lower back + 5    Unilateral: RN 12, RN 4, RN 3,  RN 14, , RN 14    NEEDLES W/ STIM  AT: 11:00 AM    NEEDLES W/ STIM REMOVED AT: 11:40 AM    #NEEDLES IN: 39    #NEEDLES OUT: 39    Other Traditional Chinese Medicine Modalities:  None    Recommendations:  PT    Education:  Patient is aware of cumulative effect of acupuncture      Assessment      Analysis of Treatment:  Patient felt good    Pt prognosis is Good.     Patient will continue to benefit from acupuncture treatment to address the deficits listed in the problem list box on initial evaluation, provide patient family education and to maximize pt's level of independence in the home and community environment.     Patient's spiritual, cultural and educational needs considered and pt agreeable to plan of care and goals.     Anticipated barriers to treatment: None    Plan     Recommend     1-2   /week for 12  treatments and re-assess.

## 2022-09-09 ENCOUNTER — OFFICE VISIT (OUTPATIENT)
Dept: OPHTHALMOLOGY | Facility: CLINIC | Age: 72
End: 2022-09-09
Payer: COMMERCIAL

## 2022-09-09 DIAGNOSIS — H26.492 POSTERIOR CAPSULAR OPACIFICATION VISUALLY SIGNIFICANT, LEFT EYE: Primary | ICD-10-CM

## 2022-09-09 PROCEDURE — 1126F PR PAIN SEVERITY QUANTIFIED, NO PAIN PRESENT: ICD-10-PCS | Mod: CPTII,S$GLB,, | Performed by: OPHTHALMOLOGY

## 2022-09-09 PROCEDURE — 1101F PR PT FALLS ASSESS DOC 0-1 FALLS W/OUT INJ PAST YR: ICD-10-PCS | Mod: CPTII,S$GLB,, | Performed by: OPHTHALMOLOGY

## 2022-09-09 PROCEDURE — 1126F AMNT PAIN NOTED NONE PRSNT: CPT | Mod: CPTII,S$GLB,, | Performed by: OPHTHALMOLOGY

## 2022-09-09 PROCEDURE — 99999 PR PBB SHADOW E&M-EST. PATIENT-LVL III: ICD-10-PCS | Mod: PBBFAC,,, | Performed by: OPHTHALMOLOGY

## 2022-09-09 PROCEDURE — 3288F FALL RISK ASSESSMENT DOCD: CPT | Mod: CPTII,S$GLB,, | Performed by: OPHTHALMOLOGY

## 2022-09-09 PROCEDURE — 66821 AFTER CATARACT LASER SURGERY: CPT | Mod: 79,LT,S$GLB, | Performed by: OPHTHALMOLOGY

## 2022-09-09 PROCEDURE — 1101F PT FALLS ASSESS-DOCD LE1/YR: CPT | Mod: CPTII,S$GLB,, | Performed by: OPHTHALMOLOGY

## 2022-09-09 PROCEDURE — 1159F MED LIST DOCD IN RCRD: CPT | Mod: CPTII,S$GLB,, | Performed by: OPHTHALMOLOGY

## 2022-09-09 PROCEDURE — 99999 PR PBB SHADOW E&M-EST. PATIENT-LVL III: CPT | Mod: PBBFAC,,, | Performed by: OPHTHALMOLOGY

## 2022-09-09 PROCEDURE — 3288F PR FALLS RISK ASSESSMENT DOCUMENTED: ICD-10-PCS | Mod: CPTII,S$GLB,, | Performed by: OPHTHALMOLOGY

## 2022-09-09 PROCEDURE — 1157F ADVNC CARE PLAN IN RCRD: CPT | Mod: CPTII,S$GLB,, | Performed by: OPHTHALMOLOGY

## 2022-09-09 PROCEDURE — 1157F PR ADVANCE CARE PLAN OR EQUIV PRESENT IN MEDICAL RECORD: ICD-10-PCS | Mod: CPTII,S$GLB,, | Performed by: OPHTHALMOLOGY

## 2022-09-09 PROCEDURE — 3044F HG A1C LEVEL LT 7.0%: CPT | Mod: CPTII,S$GLB,, | Performed by: OPHTHALMOLOGY

## 2022-09-09 PROCEDURE — 3044F PR MOST RECENT HEMOGLOBIN A1C LEVEL <7.0%: ICD-10-PCS | Mod: CPTII,S$GLB,, | Performed by: OPHTHALMOLOGY

## 2022-09-09 PROCEDURE — 1159F PR MEDICATION LIST DOCUMENTED IN MEDICAL RECORD: ICD-10-PCS | Mod: CPTII,S$GLB,, | Performed by: OPHTHALMOLOGY

## 2022-09-09 PROCEDURE — 99214 PR OFFICE/OUTPT VISIT, EST, LEVL IV, 30-39 MIN: ICD-10-PCS | Mod: 57,S$GLB,, | Performed by: OPHTHALMOLOGY

## 2022-09-09 PROCEDURE — 99214 OFFICE O/P EST MOD 30 MIN: CPT | Mod: 57,S$GLB,, | Performed by: OPHTHALMOLOGY

## 2022-09-09 PROCEDURE — 66821 YAG CAPSULOTOMY - OS - LEFT EYE: ICD-10-PCS | Mod: 79,LT,S$GLB, | Performed by: OPHTHALMOLOGY

## 2022-09-09 NOTE — PROGRESS NOTES
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 Pt c/o blurry vision in OS and   headaches above the OS as well.  Last edited by Melanie Sequeira MD on 9/9/2022  2:27 PM.            Assessment /Plan     For exam results, see Encounter Report.    Posterior capsular opacification visually significant, left eye  -     Yag Capsulotomy - OS - Left Eye      HA and strain OS - unclear etiology pt has had mx neurological dx, including cluster HA. Will proceed with yag cap OS, may improve Va may  not improve HA.    Visually significant posterior capsular opacity present.  OS  - discussed risks, benefits, and alternatives to laser surgery - pt wishes to proceed with yag laser  - Informed consent obtained and correct eye(s) verified with patient.  - Intraocular Pressure to be taken 10- 30 minutes post procedure.   - PF QID x 4 days then d/c  - f/up as scheduled    DIAGNOSIS: Visually significant posterior capsular opacity    PROCEDURE: YAG Laser Capsulotomy OS    COMPLICATIONS: none     DESCRIPTION OF PROCEDURE IN DETAIL:  1 drop of topical Proparacaine and Iopidine instilled, and eye(s) dilated with 1% Tropicamide 2.5% Phenylephrine. YAG laser applied to posterior capsule in cruciate pattern.      DISPOSITION:  Patient tolerated procedure well.             F/up 6 mo matthew GAY

## 2022-10-05 ENCOUNTER — PATIENT MESSAGE (OUTPATIENT)
Dept: RADIATION ONCOLOGY | Facility: CLINIC | Age: 72
End: 2022-10-05
Payer: MEDICARE

## 2022-10-06 ENCOUNTER — TELEPHONE (OUTPATIENT)
Dept: PHARMACY | Facility: CLINIC | Age: 72
End: 2022-10-06
Payer: MEDICARE

## 2022-10-07 ENCOUNTER — LAB VISIT (OUTPATIENT)
Dept: LAB | Facility: HOSPITAL | Age: 72
End: 2022-10-07
Attending: RADIOLOGY
Payer: COMMERCIAL

## 2022-10-07 DIAGNOSIS — C61 PROSTATE CANCER: ICD-10-CM

## 2022-10-07 LAB — COMPLEXED PSA SERPL-MCNC: 10 NG/ML (ref 0–4)

## 2022-10-07 PROCEDURE — 36415 COLL VENOUS BLD VENIPUNCTURE: CPT | Performed by: RADIOLOGY

## 2022-10-07 PROCEDURE — 84153 ASSAY OF PSA TOTAL: CPT | Performed by: RADIOLOGY

## 2022-10-10 ENCOUNTER — PATIENT MESSAGE (OUTPATIENT)
Dept: INTERNAL MEDICINE | Facility: CLINIC | Age: 72
End: 2022-10-10
Payer: MEDICARE

## 2022-10-10 ENCOUNTER — PATIENT MESSAGE (OUTPATIENT)
Dept: RADIATION ONCOLOGY | Facility: CLINIC | Age: 72
End: 2022-10-10
Payer: MEDICARE

## 2022-10-10 NOTE — TELEPHONE ENCOUNTER
Called pt to advise he is able to get his flu shot after tomorrows appt at [facility]. Pt did not answer LVM for pt

## 2022-10-11 ENCOUNTER — IMMUNIZATION (OUTPATIENT)
Dept: INTERNAL MEDICINE | Facility: CLINIC | Age: 72
End: 2022-10-11
Payer: COMMERCIAL

## 2022-10-11 ENCOUNTER — OFFICE VISIT (OUTPATIENT)
Dept: RADIATION ONCOLOGY | Facility: CLINIC | Age: 72
End: 2022-10-11
Payer: COMMERCIAL

## 2022-10-11 VITALS
SYSTOLIC BLOOD PRESSURE: 127 MMHG | HEIGHT: 68 IN | HEART RATE: 60 BPM | WEIGHT: 180.81 LBS | RESPIRATION RATE: 19 BRPM | BODY MASS INDEX: 27.4 KG/M2 | DIASTOLIC BLOOD PRESSURE: 73 MMHG | OXYGEN SATURATION: 94 % | TEMPERATURE: 99 F

## 2022-10-11 DIAGNOSIS — C61 PROSTATE CANCER: Primary | ICD-10-CM

## 2022-10-11 PROCEDURE — 1159F MED LIST DOCD IN RCRD: CPT | Mod: CPTII,S$GLB,, | Performed by: RADIOLOGY

## 2022-10-11 PROCEDURE — 3288F PR FALLS RISK ASSESSMENT DOCUMENTED: ICD-10-PCS | Mod: CPTII,S$GLB,, | Performed by: RADIOLOGY

## 2022-10-11 PROCEDURE — 1159F PR MEDICATION LIST DOCUMENTED IN MEDICAL RECORD: ICD-10-PCS | Mod: CPTII,S$GLB,, | Performed by: RADIOLOGY

## 2022-10-11 PROCEDURE — 99999 PR PBB SHADOW E&M-EST. PATIENT-LVL V: CPT | Mod: PBBFAC,,, | Performed by: RADIOLOGY

## 2022-10-11 PROCEDURE — 3044F PR MOST RECENT HEMOGLOBIN A1C LEVEL <7.0%: ICD-10-PCS | Mod: CPTII,S$GLB,, | Performed by: RADIOLOGY

## 2022-10-11 PROCEDURE — 99999 PR PBB SHADOW E&M-EST. PATIENT-LVL V: ICD-10-PCS | Mod: PBBFAC,,, | Performed by: RADIOLOGY

## 2022-10-11 PROCEDURE — 90471 FLU VACCINE - QUADRIVALENT - ADJUVANTED: ICD-10-PCS | Mod: S$GLB,,, | Performed by: INTERNAL MEDICINE

## 2022-10-11 PROCEDURE — 99212 OFFICE O/P EST SF 10 MIN: CPT | Mod: S$GLB,,, | Performed by: RADIOLOGY

## 2022-10-11 PROCEDURE — 3078F PR MOST RECENT DIASTOLIC BLOOD PRESSURE < 80 MM HG: ICD-10-PCS | Mod: CPTII,S$GLB,, | Performed by: RADIOLOGY

## 2022-10-11 PROCEDURE — 3078F DIAST BP <80 MM HG: CPT | Mod: CPTII,S$GLB,, | Performed by: RADIOLOGY

## 2022-10-11 PROCEDURE — 3288F FALL RISK ASSESSMENT DOCD: CPT | Mod: CPTII,S$GLB,, | Performed by: RADIOLOGY

## 2022-10-11 PROCEDURE — 1125F AMNT PAIN NOTED PAIN PRSNT: CPT | Mod: CPTII,S$GLB,, | Performed by: RADIOLOGY

## 2022-10-11 PROCEDURE — 90694 FLU VACCINE - QUADRIVALENT - ADJUVANTED: ICD-10-PCS | Mod: S$GLB,,, | Performed by: INTERNAL MEDICINE

## 2022-10-11 PROCEDURE — 1157F PR ADVANCE CARE PLAN OR EQUIV PRESENT IN MEDICAL RECORD: ICD-10-PCS | Mod: CPTII,S$GLB,, | Performed by: RADIOLOGY

## 2022-10-11 PROCEDURE — 3074F PR MOST RECENT SYSTOLIC BLOOD PRESSURE < 130 MM HG: ICD-10-PCS | Mod: CPTII,S$GLB,, | Performed by: RADIOLOGY

## 2022-10-11 PROCEDURE — 99212 PR OFFICE/OUTPT VISIT, EST, LEVL II, 10-19 MIN: ICD-10-PCS | Mod: S$GLB,,, | Performed by: RADIOLOGY

## 2022-10-11 PROCEDURE — 3044F HG A1C LEVEL LT 7.0%: CPT | Mod: CPTII,S$GLB,, | Performed by: RADIOLOGY

## 2022-10-11 PROCEDURE — 3074F SYST BP LT 130 MM HG: CPT | Mod: CPTII,S$GLB,, | Performed by: RADIOLOGY

## 2022-10-11 PROCEDURE — 1101F PT FALLS ASSESS-DOCD LE1/YR: CPT | Mod: CPTII,S$GLB,, | Performed by: RADIOLOGY

## 2022-10-11 PROCEDURE — 90694 VACC AIIV4 NO PRSRV 0.5ML IM: CPT | Mod: S$GLB,,, | Performed by: INTERNAL MEDICINE

## 2022-10-11 PROCEDURE — 3008F BODY MASS INDEX DOCD: CPT | Mod: CPTII,S$GLB,, | Performed by: RADIOLOGY

## 2022-10-11 PROCEDURE — 1157F ADVNC CARE PLAN IN RCRD: CPT | Mod: CPTII,S$GLB,, | Performed by: RADIOLOGY

## 2022-10-11 PROCEDURE — 90471 IMMUNIZATION ADMIN: CPT | Mod: S$GLB,,, | Performed by: INTERNAL MEDICINE

## 2022-10-11 PROCEDURE — 3008F PR BODY MASS INDEX (BMI) DOCUMENTED: ICD-10-PCS | Mod: CPTII,S$GLB,, | Performed by: RADIOLOGY

## 2022-10-11 PROCEDURE — 1101F PR PT FALLS ASSESS DOC 0-1 FALLS W/OUT INJ PAST YR: ICD-10-PCS | Mod: CPTII,S$GLB,, | Performed by: RADIOLOGY

## 2022-10-11 PROCEDURE — 1125F PR PAIN SEVERITY QUANTIFIED, PAIN PRESENT: ICD-10-PCS | Mod: CPTII,S$GLB,, | Performed by: RADIOLOGY

## 2022-10-11 NOTE — PROGRESS NOTES
Subjective:       Patient ID: Raffy Rutherford Jr. is a 72 y.o. male.    Chief Complaint: Prostate Cancer (Follow up )    This patient presents for follow up visit.     Mr. Rutherford has a history clinical stage IIB (T1c, N0, M0, PSA < 10, GG2) prostate cancer.  He presented for further evaluation after repeat PSA on 5/25/21 returned at 8.2 ng/ml.  MRI on 5/25/21 revealed a 46.7 cc prostate with a 1.3 cm T2 hypointense lesion in the Rt. mid gland, PI-RADS 4.  There was no extraprostatic extension.  The seminal vesicles and neurovascular bundles were unremarkable.  Biopsies on 6/15/21 revealed [place] 7 (3+4) adenocarcinoma involving 29% of 4 of 6 cores from the targeted lesion in the Rt. apex.  The Daniel pattern 4 accounted for 10 - 20% of the tumor.  There was a small focus of atypical glands at the Lt. apex but the remaining biopsies revealed benign prostatic tissue.  Polaris recurrence score returned at 3.2 which is at the borderline of active surveillance and single modality treatment.   His 10 year disease specific mortality was 3 % and risk of metastatic disease of 1.2% at 10 years with treatment.  Germline testing was negative.  The patient has elected for active surveillance.  Today the patient states he feels well, no complaints.     Review of Systems   Constitutional:  Negative for activity change, appetite change, chills and fatigue.   Respiratory:  Negative for cough and shortness of breath.    Gastrointestinal:  Negative for abdominal pain, constipation, diarrhea and fecal incontinence.   Genitourinary:  Negative for bladder incontinence, difficulty urinating, dysuria, frequency and hematuria.       Objective:      Physical Exam  Constitutional:       General: He is not in acute distress.     Appearance: Normal appearance.   Pulmonary:      Effort: Pulmonary effort is normal. No respiratory distress.   Abdominal:      General: Abdomen is flat. There is no distension.      Palpations: Mass: psa.    Neurological:      Mental Status: He is alert and oriented to person, place, and time.   Psychiatric:         Mood and Affect: Mood normal.         Judgment: Judgment normal.          Latest Reference Range & Units 05/03/21 15:15 05/25/21 15:52 09/22/21 11:18 01/20/22 11:33 05/03/22 13:54 07/12/22 08:34 10/07/22 09:31   PSA Diagnostic 0.00 - 4.00 ng/mL 9.9 (H)  10.2 (H) 8.6 (H) 8.9 (H) 9.4 (H) 10.0 (H)   Prostate Specific Antigen (phi) <=4.5 ng/mL  8.2 (H)        (H): Data is abnormally high  Assessment:         Prostate cancer.   Plan:       Doing well, no evidence of tumor progression based on his PSA.  Will plan to repeat his MRI next month.   If no change, will plan follow up in 6 months with PSA.

## 2022-10-11 NOTE — Clinical Note
Will plan to repeat his MRI next month.   If no change, will plan follow up in 6 months with PSA.

## 2022-10-14 ENCOUNTER — TELEPHONE (OUTPATIENT)
Dept: NEUROLOGY | Facility: CLINIC | Age: 72
End: 2022-10-14
Payer: MEDICARE

## 2022-10-16 ENCOUNTER — HOSPITAL ENCOUNTER (OUTPATIENT)
Dept: RADIOLOGY | Facility: HOSPITAL | Age: 72
Discharge: HOME | End: 2022-10-16
Attending: RADIOLOGY
Payer: COMMERCIAL

## 2022-10-16 DIAGNOSIS — C61 PROSTATE CANCER: ICD-10-CM

## 2022-10-16 PROCEDURE — 72197 MRI PELVIS W/O & W/DYE: CPT | Mod: TC

## 2022-10-16 PROCEDURE — 25500020 PHARM REV CODE 255: Performed by: RADIOLOGY

## 2022-10-16 PROCEDURE — 72197 MRI PELVIS W/O & W/DYE: CPT | Mod: 26,,, | Performed by: RADIOLOGY

## 2022-10-16 PROCEDURE — 72197 MRI PROSTATE W W/O CONTRAST: ICD-10-PCS | Mod: 26,,, | Performed by: RADIOLOGY

## 2022-10-16 PROCEDURE — A9585 GADOBUTROL INJECTION: HCPCS | Performed by: RADIOLOGY

## 2022-10-16 RX ORDER — GADOBUTROL 604.72 MG/ML
10 INJECTION INTRAVENOUS
Status: COMPLETED | OUTPATIENT
Start: 2022-10-16 | End: 2022-10-16

## 2022-10-16 RX ADMIN — GADOBUTROL 10 ML: 604.72 INJECTION INTRAVENOUS at 02:10

## 2022-10-19 ENCOUNTER — PATIENT MESSAGE (OUTPATIENT)
Dept: RADIATION ONCOLOGY | Facility: CLINIC | Age: 72
End: 2022-10-19
Payer: MEDICARE

## 2022-10-24 ENCOUNTER — OFFICE VISIT (OUTPATIENT)
Dept: NEUROLOGY | Facility: CLINIC | Age: 72
End: 2022-10-24
Payer: COMMERCIAL

## 2022-10-24 ENCOUNTER — PATIENT MESSAGE (OUTPATIENT)
Dept: INTERNAL MEDICINE | Facility: CLINIC | Age: 72
End: 2022-10-24
Payer: MEDICARE

## 2022-10-24 VITALS
HEART RATE: 66 BPM | WEIGHT: 181.56 LBS | DIASTOLIC BLOOD PRESSURE: 78 MMHG | SYSTOLIC BLOOD PRESSURE: 107 MMHG | BODY MASS INDEX: 27.52 KG/M2 | HEIGHT: 68 IN

## 2022-10-24 DIAGNOSIS — G89.29 CHRONIC LOW BACK PAIN, UNSPECIFIED BACK PAIN LATERALITY, UNSPECIFIED WHETHER SCIATICA PRESENT: ICD-10-CM

## 2022-10-24 DIAGNOSIS — M54.16 LUMBAR RADICULOPATHY, CHRONIC: ICD-10-CM

## 2022-10-24 DIAGNOSIS — G60.9 IDIOPATHIC PERIPHERAL NEUROPATHY: ICD-10-CM

## 2022-10-24 DIAGNOSIS — M21.372 BILATERAL FOOT-DROP: Primary | ICD-10-CM

## 2022-10-24 DIAGNOSIS — G43.711 CHRONIC MIGRAINE WITHOUT AURA, WITH INTRACTABLE MIGRAINE, SO STATED, WITH STATUS MIGRAINOSUS: ICD-10-CM

## 2022-10-24 DIAGNOSIS — M21.371 BILATERAL FOOT-DROP: Primary | ICD-10-CM

## 2022-10-24 DIAGNOSIS — E78.5 HYPERLIPIDEMIA, UNSPECIFIED HYPERLIPIDEMIA TYPE: ICD-10-CM

## 2022-10-24 DIAGNOSIS — M54.50 CHRONIC LOW BACK PAIN, UNSPECIFIED BACK PAIN LATERALITY, UNSPECIFIED WHETHER SCIATICA PRESENT: ICD-10-CM

## 2022-10-24 DIAGNOSIS — G60.0 CMT (CHARCOT-MARIE-TOOTH DISEASE): ICD-10-CM

## 2022-10-24 PROCEDURE — 3078F PR MOST RECENT DIASTOLIC BLOOD PRESSURE < 80 MM HG: ICD-10-PCS | Mod: CPTII,S$GLB,, | Performed by: PHYSICIAN ASSISTANT

## 2022-10-24 PROCEDURE — 3074F PR MOST RECENT SYSTOLIC BLOOD PRESSURE < 130 MM HG: ICD-10-PCS | Mod: CPTII,S$GLB,, | Performed by: PHYSICIAN ASSISTANT

## 2022-10-24 PROCEDURE — 1157F ADVNC CARE PLAN IN RCRD: CPT | Mod: CPTII,S$GLB,, | Performed by: PHYSICIAN ASSISTANT

## 2022-10-24 PROCEDURE — 1160F RVW MEDS BY RX/DR IN RCRD: CPT | Mod: CPTII,S$GLB,, | Performed by: PHYSICIAN ASSISTANT

## 2022-10-24 PROCEDURE — 3288F PR FALLS RISK ASSESSMENT DOCUMENTED: ICD-10-PCS | Mod: CPTII,S$GLB,, | Performed by: PHYSICIAN ASSISTANT

## 2022-10-24 PROCEDURE — 3078F DIAST BP <80 MM HG: CPT | Mod: CPTII,S$GLB,, | Performed by: PHYSICIAN ASSISTANT

## 2022-10-24 PROCEDURE — 1160F PR REVIEW ALL MEDS BY PRESCRIBER/CLIN PHARMACIST DOCUMENTED: ICD-10-PCS | Mod: CPTII,S$GLB,, | Performed by: PHYSICIAN ASSISTANT

## 2022-10-24 PROCEDURE — 99999 PR PBB SHADOW E&M-EST. PATIENT-LVL V: CPT | Mod: PBBFAC,,, | Performed by: PHYSICIAN ASSISTANT

## 2022-10-24 PROCEDURE — 3288F FALL RISK ASSESSMENT DOCD: CPT | Mod: CPTII,S$GLB,, | Performed by: PHYSICIAN ASSISTANT

## 2022-10-24 PROCEDURE — 1125F PR PAIN SEVERITY QUANTIFIED, PAIN PRESENT: ICD-10-PCS | Mod: CPTII,S$GLB,, | Performed by: PHYSICIAN ASSISTANT

## 2022-10-24 PROCEDURE — 3044F PR MOST RECENT HEMOGLOBIN A1C LEVEL <7.0%: ICD-10-PCS | Mod: CPTII,S$GLB,, | Performed by: PHYSICIAN ASSISTANT

## 2022-10-24 PROCEDURE — 1125F AMNT PAIN NOTED PAIN PRSNT: CPT | Mod: CPTII,S$GLB,, | Performed by: PHYSICIAN ASSISTANT

## 2022-10-24 PROCEDURE — 1159F PR MEDICATION LIST DOCUMENTED IN MEDICAL RECORD: ICD-10-PCS | Mod: CPTII,S$GLB,, | Performed by: PHYSICIAN ASSISTANT

## 2022-10-24 PROCEDURE — 3044F HG A1C LEVEL LT 7.0%: CPT | Mod: CPTII,S$GLB,, | Performed by: PHYSICIAN ASSISTANT

## 2022-10-24 PROCEDURE — 99215 OFFICE O/P EST HI 40 MIN: CPT | Mod: S$GLB,,, | Performed by: PHYSICIAN ASSISTANT

## 2022-10-24 PROCEDURE — 99215 PR OFFICE/OUTPT VISIT, EST, LEVL V, 40-54 MIN: ICD-10-PCS | Mod: S$GLB,,, | Performed by: PHYSICIAN ASSISTANT

## 2022-10-24 PROCEDURE — 1159F MED LIST DOCD IN RCRD: CPT | Mod: CPTII,S$GLB,, | Performed by: PHYSICIAN ASSISTANT

## 2022-10-24 PROCEDURE — 1157F PR ADVANCE CARE PLAN OR EQUIV PRESENT IN MEDICAL RECORD: ICD-10-PCS | Mod: CPTII,S$GLB,, | Performed by: PHYSICIAN ASSISTANT

## 2022-10-24 PROCEDURE — 1101F PT FALLS ASSESS-DOCD LE1/YR: CPT | Mod: CPTII,S$GLB,, | Performed by: PHYSICIAN ASSISTANT

## 2022-10-24 PROCEDURE — 3074F SYST BP LT 130 MM HG: CPT | Mod: CPTII,S$GLB,, | Performed by: PHYSICIAN ASSISTANT

## 2022-10-24 PROCEDURE — 99999 PR PBB SHADOW E&M-EST. PATIENT-LVL V: ICD-10-PCS | Mod: PBBFAC,,, | Performed by: PHYSICIAN ASSISTANT

## 2022-10-24 PROCEDURE — 1101F PR PT FALLS ASSESS DOC 0-1 FALLS W/OUT INJ PAST YR: ICD-10-PCS | Mod: CPTII,S$GLB,, | Performed by: PHYSICIAN ASSISTANT

## 2022-10-24 RX ORDER — RIMEGEPANT SULFATE 75 MG/75MG
75 TABLET, ORALLY DISINTEGRATING ORAL EVERY OTHER DAY
Qty: 16 TABLET | Refills: 6 | Status: SHIPPED | OUTPATIENT
Start: 2022-10-24 | End: 2022-12-05 | Stop reason: CLARIF

## 2022-10-24 NOTE — PROGRESS NOTES
[facility] - NEUROLOGY 7TH [address] [facility], [place]    Date: 10/24/22  Patient Name: Raffy Rutherford Jr.   MRN: [id number]   PCP: Haseeb Puentes  Referring Provider: Haseeb Puentes MD    Chief Complaint: Chronic Low Back Pain  Subjective:       HPI:   Mr. Raffy Rutherford Jr. is a 72 y.o. male presenting for evaluation regarding chronic low back pain. On chart review he was previously seen by Dr. Millan for both bilateral neuropathy and headache pain. He received an EMG with Dr. Millan on 03/15/2022 which showed evidence of L5/S1 radiculopahties, worse on the right; and moderate chronic reinnervation changes noted in the R vastus medialis. His current therapy regimen is Lyrica 200mg QHS. He notes he was previously seen by neurosurgery and instructed to receive an MRI lumbar spine by Dr. Caldwell but was unsure why this was ordered. We discussed that this is likely due to the evidence on the EMG indicating his bilateral foot drop may be due to a lumbar spine issue at L5/S1 level.     Headache History:   Onset- Teenager   Frequency- 5-6 per week  Duration- All day   Location- L eye and radiates to his L jaw and then toward his L shoulder and arm  Associated symptoms- Nasal blockage on the L side, + photophobia,   Denies any aura, denies nausea/vomiting   Alleviating Factors- Half Fioricet can occasionally relieve his headaches  Aggravating Factors- Smells trigger his headaches and stress/ poor sleep, weather     Of note- patient went to [place] and found in the 3 weeks he was there he only had 2-3 headaches which were easily relieved    He states that he is also concerned regarding his headaches for which he is currently taking Ubrelvy QOD and Fioricet for abortive therapy.     Tried and Failed Therapies:   Nurtec  Injection   Atogepant  Baclofen   Fioricet  Celebrex  Emgality 09/08/2020 failed  Aimovig  Gabapentin   Indometacin   Dilaudid  Verapamil      03/15/2022 EMG:   Bilateral L5/S1  changes chronic underlying radiculopathies, worse on the right. Of note acute or ongoing denervation changes were noted only in two muscles- bilateral long head of biceps femoris.   Moderate chronic reinnervation changes noted in the right vastus medialis are seen in isolation, EMG evaluation of other L2/3/4 muscles was WNL.     An underlying superimposed chronic motor axonal neuropathy could cause similar changes    01/27/2022 EMG:   Normal Study with no evidence of abnormalities    Dr. Millan note 09/09/2021:      Headache:  Type: iron hot stab like pain   Severity: 9/10  Location: left eye, forehead , cheek into left shoulder and into the back   Triggers: Particular scents, looking at screen for a long time   Frequency: worse in the last week, but otherwise 8-9/month   Duration:  Days if he does not take medication (2-3 days) can have a cycles of headaches for several weeks , sometimes can go 4 weeks without headache   Aura: none   Photophobia: not as much but does endorse turning the lights off or worsening of headache when he looks at his phone  Phonophobia: ++  Nausea/ vomiting: rarely gets nauseous   Aggravating factors: none   Relieving factors: Medications  Inhales an oil- mydab which has given him significant benefit  Fioricet- none   Ubrelvy- worked okay but Nurtec works better  Voltaren- he takes this for joint pains.   Excedrin- none   Aspirin makes tinnitus worse   Norco- 3 times a day - 1/2 pill Norco in am , 1/2 pill in the afternoon 1/2 + 3/4th pill in the evening. Pain worsens when he tries to taper off his pain medication  He snores at night, he has sleep apnea, he was on CPAP but not very compliant because he states he took the mask off in his sleep. He has tried different masks with no benefit.      Neuropathy:  He states his Lyrica has helped improve his neuropathy pain. He is taking 225 mg in the night and 50 mg in the day. Increasing the dose in the day caused tinnitus. If he cuts down on the  dose his tinnitus is not as bad.   He continues to have numbness, tingling and burning- below knee. Right > leftThe right leg burns more than the left leg. He has had two falls since he last saw me.     He uses the AFO braces for long distances.      He tried alpha-lipoic acid but this caused GERD.      Sister and brother with neuropathy      Per Note from Dr. 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 Unclear clinical significance, if family members who are symptomatic have the same VUS could be of clinical significance- has undergone genetic counseling. His symptoms started at age 62 and have progressively worsened since. ( He has extremely thin calves and hammer toes which can be seen in CMT). He also has a VUS in the  DNMT1 gene is associated with autosomal dominant cerebellar ataxia, deafness, and narcolepsy (ADCADN) and hereditary sensory neuropathy type 1E (HSN1E). He does not have this clinical phenotype.        PAST MEDICAL HISTORY:  Past Medical History:   Diagnosis Date    Allergy     Arthritis     Back pain     after trauma beginning in 89    Cataract     Chronic pain     neck and left shoulder    Cluster headache 2013    Colon polyps 2007 2007-2019: TA x5, HP x3    Degenerative disc disease     Depression     Diverticulitis 12/2013    Fibromyalgia 2013    GERD (gastroesophageal reflux disease)     Hepatitis 1970's    A    History of prostate biopsy 2002    Hyperlipidemia     Joint pain     Prostate cancer 07/2021    Sleep apnea     Thyroid nodule 7/16/2014    Tubular adenoma of colon 2007    5 removed [id number]    Visual disturbance 2012    problems after cataract surgery       PAST SURGICAL HISTORY:  Past Surgical History:   Procedure Laterality Date    BACK SURGERY      CARPAL TUNNEL RELEASE Right 5/18/2021    Procedure:  RELEASE, CARPAL TUNNEL;  Surgeon: Kaye Solis MD;  Location: [facility];  Service: Orthopedics;  Laterality: Right;    CATARACT EXTRACTION W/  INTRAOCULAR LENS IMPLANT Bilateral     CHOLECYSTECTOMY      COLONOSCOPY N/A 12/17/2019    Normal - Repeat 5yrs    COLONOSCOPY  2007 2007 TA x2, 2011 TA x3, 2014 HP x3, 2019 normal    COSMETIC SURGERY  2/10/2015    Direct mid-forehead brow lift    COSMETIC SURGERY  2/10/2015    Bilateral upper lid blepahroplasty    CYSTOSCOPY WITH URETEROSCOPY, RETROGRADE PYELOGRAPHY, AND INSERTION OF STENT Left 8/19/2020    Procedure: CYSTOSCOPY, WITH RETROGRADE PYELOGRAM AND URETERAL STENT INSERTION;  Surgeon: Katelynn George MD;  Location: [facility];  Service: Urology;  Laterality: Left;    ESOPHAGOGASTRODUODENOSCOPY N/A 12/17/2019    Procedure: ESOPHAGOGASTRODUODENOSCOPY (EGD);  Surgeon: Amadou Hardin MD;  [facility]  Service: Endoscopy;  Laterality: N/A;    EYE SURGERY      FUSION OF LUMBAR SPINE BY ANTERIOR APPROACH N/A 8/20/2020    Procedure: FUSION, SPINE, LUMBAR, ANTERIOR APPROACH L5-S1 ALIF Stand Alone;  Surgeon: Jason Caldwell MD;  Location: [facility];  Service: Neurosurgery;  Laterality: N/A;    HEMORRHOID SURGERY      with complication of chronic bleeding for 6 weeks     INJECTION OF STEROID Right 12/6/2018    Procedure: INJECTION, STEROID Right SI Joint Block and Steroid Injection;  Surgeon: Jason Caldwell MD;  Location: [facility];  Service: Neurosurgery;  Laterality: Right;  Procedure: Right SI Joint Block and Steroid Injection  Surgery Time: 30 MIN  LOS: 0  Anesthesia: MAC  Radiology: C-arm  Bed: [place] [id number] Poster  Position: Prone    INJECTION OF STEROID Right 9/19/2019    Procedure: INJECTION, STEROID Procedure: Right SI joint block nd steroid injection;  Surgeon: Jason Caldwell MD;  Location: [facility];  Service: Neurosurgery;  Laterality: Right;  Procedure: Right SI joint block nd steroid injection  Surgery Time: 30 mins  LOS:   Anesthesia: General  [facility]  Radiology:C-arm  Bed: Regular Bed  Position: Prone    IRRIGATION AND DEBRIDEMENT OF UPPER EXTREMITY Left 4/7/2022    Procedure: IRRIGATION AND DEBRIDEMENT, UPPER EXTREMITY;  Surgeon: Kaye Solis MD;  Location: [facility] OR;  Service: Orthopedics;  Laterality: Left;    JOINT REPLACEMENT      KNEE SURGERY      involving arthroscopic surgery to both knees    REPAIR OF EXTENSOR TENDON Left 4/7/2022    Procedure: REPAIR, TENDON, EXTENSOR thumb, EPB and EPL;  Surgeon: Kaye Solis MD;  Location: [facility] OR;  Service: Orthopedics;  Laterality: Left;    SINUS SURGERY      left molar and sinus surgery for trigeminal neuralgia    SPINAL FUSION  06/22/2015    L3-L5 XLIF/TANA    TOTAL HIP ARTHROPLASTY  4/2012    Pt states he had total hip replacement on his left hip.    ULNAR NERVE TRANSPOSITION Left 12/16/2020    Procedure: TRANSPOSITION, NERVE, ULNAR - left carpal and cubital tunnel releases;  Surgeon: Addi Bauer MD;  Location: [facility] OR;  Service: Orthopedics;  Laterality: Left;       CURRENT MEDS:  Current Outpatient Medications   Medication Sig Dispense Refill    atorvastatin (LIPITOR) 40 MG tablet Take 1 tablet (40 mg total) by mouth once daily. 90 tablet 3    butalbital-acetaminophen-caffeine -40 mg (FIORICET, ESGIC) -40 mg per tablet Take 1 tablet every day by oral route as needed for 30 days. 10 tablet 0    celecoxib (CELEBREX) 100 MG capsule Take 100 mg by mouth.      clonazePAM (KLONOPIN) 0.5 MG tablet Take 1 tablet by mouth twice a day as needed for anxiety 60 tablet 5    ergocalciferol (VITAMIN D2) 50,000 unit Cap Take 1 capsule (50,000 Units total) by mouth every 7 days. 12 capsule 3    HYDROcodone-acetaminophen (NORCO) 5-325 mg per tablet Take 1 tablet by mouth three times daily as needed for pain for 30 days 90 tablet 0    lamoTRIgine (LAMICTAL) 200 MG tablet Take 1 tablet (200 mg total) by mouth once daily. 90 tablet 3    methocarbamoL (ROBAXIN) 750 MG Tab TAKE 1 TABLET BY MOUTH AT NIGHT  AS NEEDED FOR MUSCLE PAIN 30 tablet 3    pregabalin (LYRICA) 25 MG capsule TAKE 1 CAPSULE IN THE AFTERNOON AND 1 CAPSULE IN EVENING FOR 30 DAYS 60 capsule 3    pregabalin (LYRICA) 75 MG capsule Take 2 capsules by mouth at bedtime for 30 days 60 capsule 3    promethazine (PHENERGAN) 25 MG tablet Take 1 tablet (25 mg total) by mouth every 6 (six) hours as needed for Nausea. 15 tablet 0    RABEprazole (ACIPHEX) 20 mg tablet Take 1 tablet (20 mg total) by mouth 2 (two) times daily. 180 tablet 3    rimegepant (NURTEC) 75 mg odt Take 1 tablet (75 mg total) by mouth every other day. Place ODT tablet on the tongue; alternatively the ODT tablet may be placed under the tongue 16 tablet 6    selegiline (EMSAM) 12 mg/24 hr Place 1 patch onto the skin once daily. 90 patch 3    tadalafiL (CIALIS) 5 MG tablet Take 1 tablet (5 mg total) by mouth daily as needed for Erectile Dysfunction. 120 tablet 3    traZODone (DESYREL) 50 MG tablet Take 1 tablet (50 mg total) by mouth every evening. 90 tablet 3     No current facility-administered medications for this visit.     Facility-Administered Medications Ordered in Other Visits   Medication Dose Route Frequency Provider Last Rate Last Admin    fentaNYL 50 mcg/mL injection 25 mcg  25 mcg Intravenous Q5 Min PRN Brent Terry MD        fentaNYL injection 25 mcg  25 mcg Intravenous Q5 Min PRN Cici Lieberman MD   50 mcg at 12/16/20 0745    midazolam (VERSED) 1 mg/mL injection 0.5 mg  0.5 mg Intravenous PRN Cici Lieberman MD   1 mg at 12/16/20 0749    midazolam (VERSED) 1 mg/mL injection 0.5 mg  0.5 mg Intravenous PRN Brent Terry MD   2 mg at 04/07/22 1215       ALLERGIES:  Review of patient's allergies indicates:   Allergen Reactions    Alphagan [brimonidine]      Patient taking MASO-B Selective Inhibitor Selegiline (Emsam)    Coumadin [warfarin]      itch    Oxycodone      hiccups       FAMILY HISTORY:  Family History   Problem Relation Age of Onset    Stroke Mother 86    Colon  cancer Mother         early/mid-60s    Uterine cancer Mother 77    Pancreatitis Brother         acute, now s/p nathalia    Diabetes Brother     Macular degeneration Brother     Other Brother         foot drop    Other Father 44        pituitary tumor- CA vs benign?    Heart attack Maternal Grandfather         suspected, 50s    Migraines Sister     Crohn's disease Sister         w/ assoc joint issues    Arthritis Sister     Tremor Daughter     Irritable bowel syndrome Son         leaning toward Crohn's dx    Neurological Disorders Son         nonspecific, benign fasciculations of calves    Tremor Son     Jaundice Grandchild     Other Maternal Uncle         memory issue    Other Maternal Uncle         memory issue    Cancer Maternal Cousin         origin? maybe thyroid? 40s?    Melanoma Neg Hx     Amblyopia Neg Hx     Blindness Neg Hx     Cataracts Neg Hx     Glaucoma Neg Hx     Hypertension Neg Hx     Retinal detachment Neg Hx     Strabismus Neg Hx     Thyroid disease Neg Hx     Allergic rhinitis Neg Hx     Allergies Neg Hx     Angioedema Neg Hx     Asthma Neg Hx     Eczema Neg Hx     Urticaria Neg Hx     Rhinitis Neg Hx     Immunodeficiency Neg Hx     Atopy Neg Hx        SOCIAL HISTORY:  Social History     Tobacco Use    Smoking status: Never    Smokeless tobacco: Never   Substance Use Topics    Alcohol use: Yes     Comment: occasion    Drug use: No       Review of Systems:  Review of Systems   Constitutional:  Negative for chills, fever and weight loss.   HENT:  Negative for ear discharge, ear pain, hearing loss, sinus pain, sore throat and tinnitus.    Eyes:  Negative for blurred vision, double vision and photophobia.   Respiratory:  Negative for cough, shortness of breath and wheezing.    Cardiovascular:  Negative for chest pain, palpitations and orthopnea.   Gastrointestinal:  Negative for constipation, diarrhea, nausea and vomiting.   Genitourinary:  Negative for dysuria, frequency and urgency.   Musculoskeletal:   "Positive for back pain. Negative for myalgias and neck pain.   Neurological:  Positive for headaches. Negative for tingling, seizures, loss of consciousness and weakness.          Objective:     Vitals:    10/24/22 1001   BP: 107/78   Pulse: 66   Weight: 82.3 kg (181 lb 8.8 oz)   Height: 5' 8" (1.727 m)         Lab Results   Component Value Date    WBC 5.95 03/25/2022    HGB 15.2 03/25/2022    HCT 47.5 03/25/2022     03/25/2022    CHOL 252 (H) 07/12/2022    TRIG 153 (H) 07/12/2022    HDL 81 (H) 07/12/2022    ALT 15 03/25/2022    AST 17 03/25/2022     03/25/2022    K 4.2 03/25/2022     03/25/2022    CREATININE 0.8 03/25/2022    BUN 14 03/25/2022    CO2 27 03/25/2022    TSH 1.025 07/12/2022    HGBA1C 5.6 07/12/2022    [id number] 09/03/2020    VITAMINB6 20 12/21/2020       NEUROLOGICAL EXAMINATION:     MENTAL STATUS   Oriented to person, place, and time.   Level of consciousness: alert    CRANIAL NERVES     CN III, IV, VI   Pupils are equal, round, and reactive to light.  Extraocular motions are normal.     CN V   Facial sensation intact.     CN VII   Facial expression full, symmetric.     CN VIII   CN VIII normal.     CN IX, X   CN IX normal.   CN X normal.     CN XI   CN XI normal.     CN XII   CN XII normal.     MOTOR EXAM   Muscle bulk: normal       Plantar Flexion:   L 3/5  R 3+/5    Dorsiflexion:   L 5/5  R 4/5         REFLEXES     Reflexes   Right brachioradialis: 2+  Left brachioradialis: 2+  Right biceps: 2+  Left biceps: 2+  Right triceps: 2+  Left triceps: 2+  Right patellar: 2+  Left patellar: 2+  Right achilles: 0  Left achilles: 0  Right plantar: equivocal  Left plantar: equivocal  Right De Leon: absent  Left [place]: absent  Right ankle clonus: absent  Left ankle clonus: absent    SENSORY EXAM   Right arm light touch: normal  Right leg light touch: normal  Left leg light touch: normal  Right arm vibration: normal  Left arm vibration: normal  Right leg vibration: normal  Left leg " vibration: decreased from toes  Proprioception normal.   Right leg pinprick: decreased from toes  Left leg pinprick: decreased from toes    GAIT AND COORDINATION     Gait  Gait: normal    Tremor   Resting tremor: absent  Intention tremor: absent  Action tremor: absent  ? ?        Assessment:   Raffy Rutherford Jr. is a 72 y.o. male presenting for evaluation of headache and bilateral foot drop.     Plan:   Will refer the patient to [facility] for botox and headache management    Will evaluate the patients MRI lumbar spine to see if there is benefit to possible new neurosurgery evaluation     Continue with PT at outpatient facility given external referral today     Follow up in 3 months or sooner if needed     Problem List Items Addressed This Visit          Neuro    Chronic migraine without aura, with intractable migraine, so stated, with status migrainosus    Current Assessment & Plan     Patient Current Therapy Regimen:   Nurtec 75mg every other day for prevention therapy previously this was beneficial for the patient    Abortive therapy: Fioricet        Tried and Failed Medications:   Nurtec  Injection   Atogepant  Baclofen   Fioricet  Celebrex  Emgality   Aimovig  Gabapentin   Indometacin   Dilaudid  Verapamil           Relevant Medications    rimegepant (NURTEC) 75 mg odt    Bilateral foot-drop - Primary    Current Assessment & Plan     Uses bilateral AFO's with benefit          Idiopathic peripheral neuropathy       Cardiac/Vascular    Hyperlipidemia    Current Assessment & Plan     Patients hyperlipidemia is currently stable and well managed by the PCP. Discussed with the patient the associated stroke risk.               Orthopedic    Chronic LBP    Overview     Dx updated per 2019 IMO Load          Other Visit Diagnoses       CMT (Charcot-Selena-Tooth disease)        Lumbar radiculopathy, chronic        Relevant Orders    Ambulatory referral/consult to Physical/Occupational Therapy              I spent a total of 60  minutes on the day of the visit. This includes face to face time and non-face to face time preparing to see the patient (eg, review of tests), obtaining and/or reviewing separately obtained history, documenting clinical information in the electronic or other health record, independently interpreting results and communicating results to the patient/family/caregiver, or care coordinator.    Nola Norman PA-C  Supervising physician Ronald Roldan MD was available for all questions during this exam.  [facility]

## 2022-10-24 NOTE — ASSESSMENT & PLAN NOTE
Patient Current Therapy Regimen:   Nurtec 75mg every other day for prevention therapy previously this was beneficial for the patient    Abortive therapy: Fioricet        Tried and Failed Medications:   Nurtec  Injection   Atogepant  Baclofen   Fioricet  Celebrex  Emgality   Aimovig  Gabapentin   Indometacin   Dilaudid  Verapamil

## 2022-10-24 NOTE — ASSESSMENT & PLAN NOTE
Patients hyperlipidemia is currently stable and well managed by the PCP. Discussed with the patient the associated stroke risk.

## 2022-10-25 ENCOUNTER — PATIENT MESSAGE (OUTPATIENT)
Dept: INTERNAL MEDICINE | Facility: CLINIC | Age: 72
End: 2022-10-25
Payer: MEDICARE

## 2022-10-26 ENCOUNTER — TELEPHONE (OUTPATIENT)
Dept: PHARMACY | Facility: CLINIC | Age: 72
End: 2022-10-26
Payer: MEDICARE

## 2022-10-27 ENCOUNTER — PATIENT MESSAGE (OUTPATIENT)
Dept: NEUROLOGY | Facility: CLINIC | Age: 72
End: 2022-10-27
Payer: MEDICARE

## 2022-10-27 ENCOUNTER — OFFICE VISIT (OUTPATIENT)
Dept: RADIATION ONCOLOGY | Facility: CLINIC | Age: 72
End: 2022-10-27
Payer: COMMERCIAL

## 2022-10-27 DIAGNOSIS — C61 PROSTATE CANCER: Primary | ICD-10-CM

## 2022-10-27 PROCEDURE — 1159F PR MEDICATION LIST DOCUMENTED IN MEDICAL RECORD: ICD-10-PCS | Mod: CPTII,95,, | Performed by: RADIOLOGY

## 2022-10-27 PROCEDURE — 1157F PR ADVANCE CARE PLAN OR EQUIV PRESENT IN MEDICAL RECORD: ICD-10-PCS | Mod: CPTII,95,, | Performed by: RADIOLOGY

## 2022-10-27 PROCEDURE — 3044F PR MOST RECENT HEMOGLOBIN A1C LEVEL <7.0%: ICD-10-PCS | Mod: CPTII,95,, | Performed by: RADIOLOGY

## 2022-10-27 PROCEDURE — 3044F HG A1C LEVEL LT 7.0%: CPT | Mod: CPTII,95,, | Performed by: RADIOLOGY

## 2022-10-27 PROCEDURE — 1160F PR REVIEW ALL MEDS BY PRESCRIBER/CLIN PHARMACIST DOCUMENTED: ICD-10-PCS | Mod: CPTII,95,, | Performed by: RADIOLOGY

## 2022-10-27 PROCEDURE — 1160F RVW MEDS BY RX/DR IN RCRD: CPT | Mod: CPTII,95,, | Performed by: RADIOLOGY

## 2022-10-27 PROCEDURE — 99212 OFFICE O/P EST SF 10 MIN: CPT | Mod: 95,,, | Performed by: RADIOLOGY

## 2022-10-27 PROCEDURE — 1159F MED LIST DOCD IN RCRD: CPT | Mod: CPTII,95,, | Performed by: RADIOLOGY

## 2022-10-27 PROCEDURE — 99212 PR OFFICE/OUTPT VISIT, EST, LEVL II, 10-19 MIN: ICD-10-PCS | Mod: 95,,, | Performed by: RADIOLOGY

## 2022-10-27 PROCEDURE — 1157F ADVNC CARE PLAN IN RCRD: CPT | Mod: CPTII,95,, | Performed by: RADIOLOGY

## 2022-10-27 NOTE — PROGRESS NOTES
Subjective:       Patient ID: Raffy Rutherford Jr. is a 72 y.o. male.    Chief Complaint: No chief complaint on file.    The patient location is: home  The chief complaint leading to consultation is: prostate cancer     Visit type: audiovisual    Face to Face time with patient: 25 minutes   35 minutes of total time spent on the encounter, which includes face to face time and non-face to face time preparing to see the patient (eg, review of tests), Obtaining and/or reviewing separately obtained history, Documenting clinical information in the electronic or other health record, Independently interpreting results (not separately reported) and communicating results to the patient/family/caregiver, or Care coordination (not separately reported).     Each patient to whom he or she provides medical services by telemedicine is:  (1) informed of the relationship between the physician and patient and the respective role of any other health care provider with respect to management of the patient; and (2) notified that he or she may decline to receive medical services by telemedicine and may withdraw from such care at any time.    Mr. Rutherford has a history clinical stage IIB (T1c, N0, M0, PSA < 10, GG2) prostate cancer.  He presented for further evaluation after repeat PSA on 5/25/21 returned at 8.2 ng/ml.  MRI on 5/25/21 revealed a 46.7 cc prostate with a 1.3 cm T2 hypointense lesion in the Rt. mid gland, PI-RADS 4.  There was no extraprostatic extension.  The seminal vesicles and neurovascular bundles were unremarkable.  Biopsies on 6/15/21 revealed Prescott 7 (3+4) adenocarcinoma involving 29% of 4 of 6 cores from the targeted lesion in the Rt. apex.  The Daniel pattern 4 accounted for 10 - 20% of the tumor.  There was a small focus of atypical glands at the Lt. apex but the remaining biopsies revealed benign prostatic tissue.  Polaris recurrence score returned at 3.2 which is at the borderline of active surveillance and single  modality treatment.   His 10 year disease specific mortality was 3 % and risk of metastatic disease of 1.2% at 10 years with treatment.  Germline testing was negative.  The patient presents for discussion of recent MRI result.      Review of Systems   Constitutional:  Negative for activity change, appetite change, chills and fatigue.   Gastrointestinal:  Negative for abdominal pain, constipation, diarrhea and fecal incontinence.   Genitourinary:  Negative for bladder incontinence, difficulty urinating, dysuria, frequency and hematuria.       Objective:      Physical Exam  Constitutional:       General: He is not in acute distress.     Appearance: Normal appearance.   Neurological:      Mental Status: He is alert and oriented to person, place, and time.   Psychiatric:         Mood and Affect: Mood normal.         Judgment: Judgment normal.       MRI revealed Right anterior transitional zone lesion as above (PI-RADS 4), not significantly changed compared to prior MRI prostate 05/25/2021.  Additional right mid anterior peripheral zone lesion (PI-RADS 3), more conspicuous compared to prior exam.  No extracapsular extension.  No abnormal lymph nodes.     Overall Assessment: PI-RADS 4 - High (clinically significant cancer is likely to be present)     Number of targets created for potential MR/US fusion biopsy     Peripheral zone: 1     Transition zone: 1  Assessment:       Problem List Items Addressed This Visit       Prostate cancer - Primary         Plan:       Discussed MRI results.  Discussed the options of repeat biopsy and /or treatment vs continued active surveillance.  Explained based on the MRI findings we would be comfortable with continued active surveillance with PSA in 4 - 6 months.  The patient and his wife agreed to continued active surveillance.  Plan follow up PSA in 4 - 6 months.

## 2022-11-01 ENCOUNTER — PATIENT MESSAGE (OUTPATIENT)
Dept: NEUROLOGY | Facility: CLINIC | Age: 72
End: 2022-11-01
Payer: MEDICARE

## 2022-11-03 ENCOUNTER — OFFICE VISIT (OUTPATIENT)
Dept: OTOLARYNGOLOGY | Facility: CLINIC | Age: 72
End: 2022-11-03
Payer: COMMERCIAL

## 2022-11-03 ENCOUNTER — CLINICAL SUPPORT (OUTPATIENT)
Dept: AUDIOLOGY | Facility: CLINIC | Age: 72
End: 2022-11-03
Payer: COMMERCIAL

## 2022-11-03 VITALS — SYSTOLIC BLOOD PRESSURE: 117 MMHG | DIASTOLIC BLOOD PRESSURE: 75 MMHG | HEART RATE: 54 BPM

## 2022-11-03 DIAGNOSIS — H90.3 SENSORINEURAL HEARING LOSS (SNHL), BILATERAL: Primary | ICD-10-CM

## 2022-11-03 DIAGNOSIS — H61.23 IMPACTED CERUMEN, BILATERAL: ICD-10-CM

## 2022-11-03 DIAGNOSIS — H93.11 TINNITUS, RIGHT: ICD-10-CM

## 2022-11-03 DIAGNOSIS — H93.13 TINNITUS, BILATERAL: Primary | ICD-10-CM

## 2022-11-03 DIAGNOSIS — H90.3 HEARING LOSS, SENSORINEURAL, HIGH FREQUENCY, BILATERAL: ICD-10-CM

## 2022-11-03 PROCEDURE — 99999 PR PBB SHADOW E&M-EST. PATIENT-LVL I: ICD-10-PCS | Mod: PBBFAC,,,

## 2022-11-03 PROCEDURE — 1101F PT FALLS ASSESS-DOCD LE1/YR: CPT | Mod: CPTII,S$GLB,, | Performed by: OTOLARYNGOLOGY

## 2022-11-03 PROCEDURE — 99499 UNLISTED E&M SERVICE: CPT | Mod: S$GLB,,, | Performed by: OTOLARYNGOLOGY

## 2022-11-03 PROCEDURE — 99999 PR PBB SHADOW E&M-EST. PATIENT-LVL III: CPT | Mod: PBBFAC,,, | Performed by: OTOLARYNGOLOGY

## 2022-11-03 PROCEDURE — 1157F ADVNC CARE PLAN IN RCRD: CPT | Mod: CPTII,S$GLB,, | Performed by: OTOLARYNGOLOGY

## 2022-11-03 PROCEDURE — 69210 PR REMOVAL IMPACTED CERUMEN REQUIRING INSTRUMENTATION, UNILATERAL: ICD-10-PCS | Mod: S$GLB,,, | Performed by: OTOLARYNGOLOGY

## 2022-11-03 PROCEDURE — 92567 PR TYMPA2METRY: ICD-10-PCS | Mod: S$GLB,,,

## 2022-11-03 PROCEDURE — 3078F DIAST BP <80 MM HG: CPT | Mod: CPTII,S$GLB,, | Performed by: OTOLARYNGOLOGY

## 2022-11-03 PROCEDURE — 99999 PR PBB SHADOW E&M-EST. PATIENT-LVL III: ICD-10-PCS | Mod: PBBFAC,,, | Performed by: OTOLARYNGOLOGY

## 2022-11-03 PROCEDURE — 1126F AMNT PAIN NOTED NONE PRSNT: CPT | Mod: CPTII,S$GLB,, | Performed by: OTOLARYNGOLOGY

## 2022-11-03 PROCEDURE — 92557 PR COMPREHENSIVE HEARING TEST: ICD-10-PCS | Mod: S$GLB,,,

## 2022-11-03 PROCEDURE — 99499 NO LOS: ICD-10-PCS | Mod: S$GLB,,, | Performed by: OTOLARYNGOLOGY

## 2022-11-03 PROCEDURE — 3074F SYST BP LT 130 MM HG: CPT | Mod: CPTII,S$GLB,, | Performed by: OTOLARYNGOLOGY

## 2022-11-03 PROCEDURE — 1157F PR ADVANCE CARE PLAN OR EQUIV PRESENT IN MEDICAL RECORD: ICD-10-PCS | Mod: CPTII,S$GLB,, | Performed by: OTOLARYNGOLOGY

## 2022-11-03 PROCEDURE — 3044F HG A1C LEVEL LT 7.0%: CPT | Mod: CPTII,S$GLB,, | Performed by: OTOLARYNGOLOGY

## 2022-11-03 PROCEDURE — 92567 TYMPANOMETRY: CPT | Mod: S$GLB,,,

## 2022-11-03 PROCEDURE — 3078F PR MOST RECENT DIASTOLIC BLOOD PRESSURE < 80 MM HG: ICD-10-PCS | Mod: CPTII,S$GLB,, | Performed by: OTOLARYNGOLOGY

## 2022-11-03 PROCEDURE — 3074F PR MOST RECENT SYSTOLIC BLOOD PRESSURE < 130 MM HG: ICD-10-PCS | Mod: CPTII,S$GLB,, | Performed by: OTOLARYNGOLOGY

## 2022-11-03 PROCEDURE — 69210 REMOVE IMPACTED EAR WAX UNI: CPT | Mod: S$GLB,,, | Performed by: OTOLARYNGOLOGY

## 2022-11-03 PROCEDURE — 92557 COMPREHENSIVE HEARING TEST: CPT | Mod: S$GLB,,,

## 2022-11-03 PROCEDURE — 1159F MED LIST DOCD IN RCRD: CPT | Mod: CPTII,S$GLB,, | Performed by: OTOLARYNGOLOGY

## 2022-11-03 PROCEDURE — 1126F PR PAIN SEVERITY QUANTIFIED, NO PAIN PRESENT: ICD-10-PCS | Mod: CPTII,S$GLB,, | Performed by: OTOLARYNGOLOGY

## 2022-11-03 PROCEDURE — 3288F PR FALLS RISK ASSESSMENT DOCUMENTED: ICD-10-PCS | Mod: CPTII,S$GLB,, | Performed by: OTOLARYNGOLOGY

## 2022-11-03 PROCEDURE — 99999 PR PBB SHADOW E&M-EST. PATIENT-LVL I: CPT | Mod: PBBFAC,,,

## 2022-11-03 PROCEDURE — 3044F PR MOST RECENT HEMOGLOBIN A1C LEVEL <7.0%: ICD-10-PCS | Mod: CPTII,S$GLB,, | Performed by: OTOLARYNGOLOGY

## 2022-11-03 PROCEDURE — 1159F PR MEDICATION LIST DOCUMENTED IN MEDICAL RECORD: ICD-10-PCS | Mod: CPTII,S$GLB,, | Performed by: OTOLARYNGOLOGY

## 2022-11-03 PROCEDURE — 1101F PR PT FALLS ASSESS DOC 0-1 FALLS W/OUT INJ PAST YR: ICD-10-PCS | Mod: CPTII,S$GLB,, | Performed by: OTOLARYNGOLOGY

## 2022-11-03 PROCEDURE — 3288F FALL RISK ASSESSMENT DOCD: CPT | Mod: CPTII,S$GLB,, | Performed by: OTOLARYNGOLOGY

## 2022-11-03 NOTE — PROGRESS NOTES
Raffy Rutherford Jr., a 72 y.o. male, was seen today in the clinic for an audiologic evaluation.  The patient has a history of a bilateral sensorineural hearing loss (SNHL).  Mr. Rutherford reported intermittent tinnitus in his right ear.  He stated he tried hearing aids mainly to help with his tinnitus, but he did not find much relief or benefit.  There were no reports of aural fullness, otalgia, or dizziness.    Tympanometry revealed Type Ad in the right ear and Type Ad in the left ear.  Audiogram results revealed a normal sloping to moderately-severe SNHL in the right ear and a normal sloping to moderate SNHL in the left ear.  Speech reception thresholds were noted at 0 dB in the right ear and 0 dB in the left ear.  Speech discrimination scores were 88% in the right ear and 96% in the left ear.    Recommendations:  Otologic evaluation  Hearing protection when in noise  Hearing aid consultation after medical clearance, if desired by patient  Annual audiogram

## 2022-11-03 NOTE — PROGRESS NOTES
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  He forgot to schedule for audiometry today; I have arranged for this testing this morning.  He indicates his history of tinnitus perception which has not been alleviated with amplification in the recent past.    He is not sure if his hearing loss warrants amplification by itself.    Past Medical History:   Diagnosis Date    Allergy     Arthritis     Back pain     after trauma beginning in 89    Cataract     Chronic pain     neck and left shoulder    Cluster headache 2013    Colon polyps 2007 2007-2019: TA x5, HP x3    Degenerative disc disease     Depression     Diverticulitis 12/2013    Fibromyalgia 2013    GERD (gastroesophageal reflux disease)     Hepatitis 1970's    A    History of prostate biopsy 2002    Hyperlipidemia     Joint pain     Prostate cancer 07/2021    Sleep apnea     Thyroid nodule 7/16/2014    Tubular adenoma of colon 2007    5 removed [id number]    Visual disturbance 2012    problems after cataract surgery   ALL; coumadin, oxycodone, alphagan        PE:  General:  Alert and oriented gentleman in no acute distress  Both ears are examined under the microscope.  Procedures:  Somewhat occlusive cerumen impactions a Faith stridor from both ear canals with use of suction.    Both TMs are clear and intact in both middle ear spaces appear well aerated.    Mr. Rutherford completed an audiometric study today which is duplicated below.  The results of the study are reviewed with him in detail and are compared to those of his previous study.              DIAGNOSIS:   1. Tinnitus, bilateral        2. Hearing loss, sensorineural, high frequency, bilateral ; slightly progressive       3. Impacted cerumen, bilateral            PLAN: Cerumen impaction extracted from both semi occluded ear canals today.  Audiometry performed today; results reviewed with patient and compared to those of  previous study  RTC 3 months for ear cleaning; may consider amplification for one of both ears for hearing loss

## 2022-11-03 NOTE — PATIENT INSTRUCTIONS
Cerumen impaction extracted from both semi occluded ear canals today.  Audiometry performed today; results reviewed with patient and compared to those of previous study  RTC 3 months for ear cleaning; may consider amplification for one of both ears for hearing loss

## 2022-11-18 ENCOUNTER — PATIENT MESSAGE (OUTPATIENT)
Dept: NEUROLOGY | Facility: CLINIC | Age: 72
End: 2022-11-18
Payer: COMMERCIAL

## 2022-11-18 ENCOUNTER — PATIENT MESSAGE (OUTPATIENT)
Dept: NEUROSURGERY | Facility: CLINIC | Age: 72
End: 2022-11-18
Payer: COMMERCIAL

## 2022-11-20 ENCOUNTER — PATIENT MESSAGE (OUTPATIENT)
Dept: INTERNAL MEDICINE | Facility: CLINIC | Age: 72
End: 2022-11-20
Payer: COMMERCIAL

## 2022-11-28 ENCOUNTER — PATIENT MESSAGE (OUTPATIENT)
Dept: NEUROLOGY | Facility: CLINIC | Age: 72
End: 2022-11-28
Payer: COMMERCIAL

## 2022-12-04 ENCOUNTER — PATIENT MESSAGE (OUTPATIENT)
Dept: NEUROLOGY | Facility: CLINIC | Age: 72
End: 2022-12-04
Payer: COMMERCIAL

## 2022-12-05 DIAGNOSIS — G43.909 MIGRAINE WITHOUT STATUS MIGRAINOSUS, NOT INTRACTABLE, UNSPECIFIED MIGRAINE TYPE: Primary | ICD-10-CM

## 2022-12-05 NOTE — PROGRESS NOTES
Patient requested a new medication for headache prevention.     Patient has tried and failed:   Fioricet  Emgality   Ubrelvy   Aimovig  Nurtec QOD  Gabapentin  Indomethacin  Tramadol       I called attempted to discuss patient concerns however he did not answer I left a message with a call back number.       Nola Norman PA-C    Due to patient trial and failure of Emgality and Aimovig it is medically necessary that the patient try Ajovy for prevention of migraine headaches.

## 2022-12-07 ENCOUNTER — TELEPHONE (OUTPATIENT)
Dept: PHARMACY | Facility: CLINIC | Age: 72
End: 2022-12-07
Payer: COMMERCIAL

## 2022-12-12 ENCOUNTER — HOSPITAL ENCOUNTER (OUTPATIENT)
Dept: RADIOLOGY | Facility: HOSPITAL | Age: 72
Discharge: HOME | End: 2022-12-12
Attending: NEUROLOGICAL SURGERY
Payer: COMMERCIAL

## 2022-12-12 DIAGNOSIS — M54.9 DORSALGIA, UNSPECIFIED: ICD-10-CM

## 2022-12-12 PROCEDURE — 72158 MRI LUMBAR SPINE W/O & W/DYE: CPT | Mod: 26,,, | Performed by: RADIOLOGY

## 2022-12-12 PROCEDURE — 72158 MRI LUMBAR SPINE W/O & W/DYE: CPT | Mod: TC

## 2022-12-12 PROCEDURE — 25500020 PHARM REV CODE 255: Performed by: NEUROLOGICAL SURGERY

## 2022-12-12 PROCEDURE — A9585 GADOBUTROL INJECTION: HCPCS | Performed by: NEUROLOGICAL SURGERY

## 2022-12-12 PROCEDURE — 72158 MRI LUMBAR SPINE W WO CONTRAST: ICD-10-PCS | Mod: 26,,, | Performed by: RADIOLOGY

## 2022-12-12 RX ORDER — GADOBUTROL 604.72 MG/ML
9.5 INJECTION INTRAVENOUS
Status: COMPLETED | OUTPATIENT
Start: 2022-12-12 | End: 2022-12-12

## 2022-12-12 RX ADMIN — GADOBUTROL 9.5 ML: 604.72 INJECTION INTRAVENOUS at 05:12

## 2022-12-13 ENCOUNTER — PATIENT MESSAGE (OUTPATIENT)
Dept: NEUROLOGY | Facility: CLINIC | Age: 72
End: 2022-12-13
Payer: COMMERCIAL

## 2022-12-13 ENCOUNTER — PATIENT MESSAGE (OUTPATIENT)
Dept: NEUROSURGERY | Facility: CLINIC | Age: 72
End: 2022-12-13
Payer: COMMERCIAL

## 2022-12-14 ENCOUNTER — OFFICE VISIT (OUTPATIENT)
Dept: UROLOGY | Facility: CLINIC | Age: 72
End: 2022-12-14
Payer: COMMERCIAL

## 2022-12-14 VITALS
HEART RATE: 60 BPM | SYSTOLIC BLOOD PRESSURE: 123 MMHG | BODY MASS INDEX: 27.74 KG/M2 | DIASTOLIC BLOOD PRESSURE: 76 MMHG | WEIGHT: 183 LBS | HEIGHT: 68 IN

## 2022-12-14 DIAGNOSIS — R39.12 WEAK URINARY STREAM: ICD-10-CM

## 2022-12-14 DIAGNOSIS — N13.8 BPH WITH URINARY OBSTRUCTION: ICD-10-CM

## 2022-12-14 DIAGNOSIS — C61 PROSTATE CANCER: Primary | ICD-10-CM

## 2022-12-14 DIAGNOSIS — R39.9 LOWER URINARY TRACT SYMPTOMS (LUTS): ICD-10-CM

## 2022-12-14 DIAGNOSIS — R35.0 URINARY FREQUENCY: ICD-10-CM

## 2022-12-14 DIAGNOSIS — N40.1 BPH WITH URINARY OBSTRUCTION: ICD-10-CM

## 2022-12-14 LAB
BILIRUB SERPL-MCNC: NORMAL MG/DL
BLOOD URINE, POC: NORMAL
CLARITY, POC UA: CLEAR
COLOR, POC UA: YELLOW
GLUCOSE UR QL STRIP: NORMAL
KETONES UR QL STRIP: NORMAL
LEUKOCYTE ESTERASE URINE, POC: NORMAL
NITRITE, POC UA: NORMAL
PH, POC UA: 8
POC RESIDUAL URINE VOLUME: 6 ML (ref 0–100)
PROTEIN, POC: NORMAL
SPECIFIC GRAVITY, POC UA: NORMAL
UROBILINOGEN, POC UA: NORMAL

## 2022-12-14 PROCEDURE — 3044F HG A1C LEVEL LT 7.0%: CPT | Mod: CPTII,S$GLB,, | Performed by: NURSE PRACTITIONER

## 2022-12-14 PROCEDURE — 3074F SYST BP LT 130 MM HG: CPT | Mod: CPTII,S$GLB,, | Performed by: NURSE PRACTITIONER

## 2022-12-14 PROCEDURE — 3078F PR MOST RECENT DIASTOLIC BLOOD PRESSURE < 80 MM HG: ICD-10-PCS | Mod: CPTII,S$GLB,, | Performed by: NURSE PRACTITIONER

## 2022-12-14 PROCEDURE — 3288F PR FALLS RISK ASSESSMENT DOCUMENTED: ICD-10-PCS | Mod: CPTII,S$GLB,, | Performed by: NURSE PRACTITIONER

## 2022-12-14 PROCEDURE — 3074F PR MOST RECENT SYSTOLIC BLOOD PRESSURE < 130 MM HG: ICD-10-PCS | Mod: CPTII,S$GLB,, | Performed by: NURSE PRACTITIONER

## 2022-12-14 PROCEDURE — 1159F MED LIST DOCD IN RCRD: CPT | Mod: CPTII,S$GLB,, | Performed by: NURSE PRACTITIONER

## 2022-12-14 PROCEDURE — 1157F ADVNC CARE PLAN IN RCRD: CPT | Mod: CPTII,S$GLB,, | Performed by: NURSE PRACTITIONER

## 2022-12-14 PROCEDURE — 1101F PR PT FALLS ASSESS DOC 0-1 FALLS W/OUT INJ PAST YR: ICD-10-PCS | Mod: CPTII,S$GLB,, | Performed by: NURSE PRACTITIONER

## 2022-12-14 PROCEDURE — 99999 PR PBB SHADOW E&M-EST. PATIENT-LVL IV: CPT | Mod: PBBFAC,,, | Performed by: NURSE PRACTITIONER

## 2022-12-14 PROCEDURE — 51798 POCT BLADDER SCAN: ICD-10-PCS | Mod: S$GLB,,, | Performed by: NURSE PRACTITIONER

## 2022-12-14 PROCEDURE — 1157F PR ADVANCE CARE PLAN OR EQUIV PRESENT IN MEDICAL RECORD: ICD-10-PCS | Mod: CPTII,S$GLB,, | Performed by: NURSE PRACTITIONER

## 2022-12-14 PROCEDURE — 99214 OFFICE O/P EST MOD 30 MIN: CPT | Mod: S$GLB,,, | Performed by: NURSE PRACTITIONER

## 2022-12-14 PROCEDURE — 1101F PT FALLS ASSESS-DOCD LE1/YR: CPT | Mod: CPTII,S$GLB,, | Performed by: NURSE PRACTITIONER

## 2022-12-14 PROCEDURE — 3288F FALL RISK ASSESSMENT DOCD: CPT | Mod: CPTII,S$GLB,, | Performed by: NURSE PRACTITIONER

## 2022-12-14 PROCEDURE — 51798 US URINE CAPACITY MEASURE: CPT | Mod: S$GLB,,, | Performed by: NURSE PRACTITIONER

## 2022-12-14 PROCEDURE — 1125F AMNT PAIN NOTED PAIN PRSNT: CPT | Mod: CPTII,S$GLB,, | Performed by: NURSE PRACTITIONER

## 2022-12-14 PROCEDURE — 1159F PR MEDICATION LIST DOCUMENTED IN MEDICAL RECORD: ICD-10-PCS | Mod: CPTII,S$GLB,, | Performed by: NURSE PRACTITIONER

## 2022-12-14 PROCEDURE — 3078F DIAST BP <80 MM HG: CPT | Mod: CPTII,S$GLB,, | Performed by: NURSE PRACTITIONER

## 2022-12-14 PROCEDURE — 99999 PR PBB SHADOW E&M-EST. PATIENT-LVL IV: ICD-10-PCS | Mod: PBBFAC,,, | Performed by: NURSE PRACTITIONER

## 2022-12-14 PROCEDURE — 3008F PR BODY MASS INDEX (BMI) DOCUMENTED: ICD-10-PCS | Mod: CPTII,S$GLB,, | Performed by: NURSE PRACTITIONER

## 2022-12-14 PROCEDURE — 3008F BODY MASS INDEX DOCD: CPT | Mod: CPTII,S$GLB,, | Performed by: NURSE PRACTITIONER

## 2022-12-14 PROCEDURE — 1160F PR REVIEW ALL MEDS BY PRESCRIBER/CLIN PHARMACIST DOCUMENTED: ICD-10-PCS | Mod: CPTII,S$GLB,, | Performed by: NURSE PRACTITIONER

## 2022-12-14 PROCEDURE — 81002 URINALYSIS NONAUTO W/O SCOPE: CPT | Mod: S$GLB,,, | Performed by: NURSE PRACTITIONER

## 2022-12-14 PROCEDURE — 99214 PR OFFICE/OUTPT VISIT, EST, LEVL IV, 30-39 MIN: ICD-10-PCS | Mod: S$GLB,,, | Performed by: NURSE PRACTITIONER

## 2022-12-14 PROCEDURE — 1125F PR PAIN SEVERITY QUANTIFIED, PAIN PRESENT: ICD-10-PCS | Mod: CPTII,S$GLB,, | Performed by: NURSE PRACTITIONER

## 2022-12-14 PROCEDURE — 1160F RVW MEDS BY RX/DR IN RCRD: CPT | Mod: CPTII,S$GLB,, | Performed by: NURSE PRACTITIONER

## 2022-12-14 PROCEDURE — 3044F PR MOST RECENT HEMOGLOBIN A1C LEVEL <7.0%: ICD-10-PCS | Mod: CPTII,S$GLB,, | Performed by: NURSE PRACTITIONER

## 2022-12-14 PROCEDURE — 81002 POCT URINE DIPSTICK WITHOUT MICROSCOPE: ICD-10-PCS | Mod: S$GLB,,, | Performed by: NURSE PRACTITIONER

## 2022-12-14 RX ORDER — TERAZOSIN 5 MG/1
5 CAPSULE ORAL NIGHTLY
Qty: 90 CAPSULE | Refills: 3 | Status: SHIPPED | OUTPATIENT
Start: 2022-12-14 | End: 2023-05-05 | Stop reason: DRUGHIGH

## 2022-12-14 NOTE — PROGRESS NOTES
CHIEF COMPLAINT:    Raffy Rutherford Jr. is a 72 y.o. male presents today due to difficulty urinating.     HISTORY OF PRESENTING ILLINESS:    Raffy Rutherford Jr. is a 72 y.o. Male with a history of BPH with LUTS, prostatitis, and now Prostate Cancer. He is currently on AS with Rad/Onc.  He saw Dr. Adame 10/27/2022,    Last clinic visit with me was 09/28/2021.   He is here today due to some worsening LUTS.   Over the past month he has noticed a weaker stream; FOS can start/stop.  He feels that he is emptying well, but it is taking a lot longer. (+) straining and urgency     He had been successfully managing his LUTS with Cialis 5mg daily. He failed Flomax and Finasteride; he did not like the side effects  No dysuria or hematuria.   No new meds.   Not drinking a lot of fluids at night.     Also reporting slight change in his erections; functional but not as stronger.     PROSTATE CANCER HISTORY:    He was diagnosed 06/15/2021 with PSA of 8.5 at the time. PI RADs 4 lesion on MRI  Prostate was 46.7cc  Daniel 7(3+4) in 4 of 6 cores at right apex  04/04/2021 Prolaris Molecular Score was 3.2  He was seen in clinic with Dr. Cheng on 07/22/2021 for initial Talk  Currently on AS being followed by Rad/Onc    10/16/2022 MRI of Prostate:  -right anterior Tz (PI-RADS 4), stable from 05/25/2022 MRI.  - right mid anterior Pz lesion (PI-RADS 3), more conspicuous compared to prior exam.  No extracapsular extension.  No abnormal lymph nodes.  -2 target lesions    10/27/2022 seen with Dr. Adame; PSA was 10  Options discussed; repeat PSA 4-6 months.             REVIEW OF SYSTEMS:  Review of Systems   Constitutional: Negative.  Negative for chills and fever.   Eyes:  Negative for double vision.   Respiratory:  Negative for cough and shortness of breath.    Cardiovascular:  Negative for chest pain.   Gastrointestinal:  Negative for abdominal pain, constipation, diarrhea, nausea and vomiting.   Genitourinary:  Positive for frequency  and urgency. Negative for dysuria, flank pain and hematuria.        FOS weaker; start and stops  Nocturia     Musculoskeletal:  Positive for myalgias.        Multiple back surgeries;  Splints on LE   Neurological:  Negative for dizziness and seizures.   Endo/Heme/Allergies:  Negative for polydipsia.       PATIENT HISTORY:    Past Medical History:   Diagnosis Date    Allergy     Arthritis     Back pain     after trauma beginning in 89    Cataract     Chronic pain     neck and left shoulder    Cluster headache 2013    Colon polyps 2007 2007-2019: TA x5, HP x3    Degenerative disc disease     Depression     Diverticulitis 12/2013    Fibromyalgia 2013    GERD (gastroesophageal reflux disease)     Hepatitis 1970's    A    History of prostate biopsy 2002    Hyperlipidemia     Joint pain     Prostate cancer 07/2021    Sleep apnea     Thyroid nodule 7/16/2014    Tubular adenoma of colon 2007    5 removed [id number]    Visual disturbance 2012    problems after cataract surgery       Past Surgical History:   Procedure Laterality Date    BACK SURGERY      CARPAL TUNNEL RELEASE Right 5/18/2021    Procedure: RELEASE, CARPAL TUNNEL;  Surgeon: Kaye Solis MD;  Location: [facility];  Service: Orthopedics;  Laterality: Right;    CATARACT EXTRACTION W/  INTRAOCULAR LENS IMPLANT Bilateral     CHOLECYSTECTOMY      COLONOSCOPY N/A 12/17/2019    Normal - Repeat 5yrs    COLONOSCOPY  2007 2007 TA x2, 2011 TA x3, 2014 HP x3, 2019 normal    COSMETIC SURGERY  2/10/2015    Direct mid-forehead brow lift    COSMETIC SURGERY  2/10/2015    Bilateral upper lid blepahroplasty    CYSTOSCOPY WITH URETEROSCOPY, RETROGRADE PYELOGRAPHY, AND INSERTION OF STENT Left 8/19/2020    Procedure: CYSTOSCOPY, WITH RETROGRADE PYELOGRAM AND URETERAL STENT INSERTION;  Surgeon: Katelynn George MD;  Location: [facility] OR;  Service: Urology;  Laterality: Left;    ESOPHAGOGASTRODUODENOSCOPY N/A 12/17/2019    Procedure: ESOPHAGOGASTRODUODENOSCOPY (EGD);  Surgeon: Amadou  GEORGIANA Hardin MD;  Location: [facility] ENDO (4TH FLR);  Service: Endoscopy;  Laterality: N/A;    EYE SURGERY      FUSION OF LUMBAR SPINE BY ANTERIOR APPROACH N/A 8/20/2020    Procedure: FUSION, SPINE, LUMBAR, ANTERIOR APPROACH L5-S1 ALIF Stand Alone;  Surgeon: Jason Caldwell MD;  Location: [facility];  Service: Neurosurgery;  Laterality: N/A;    HEMORRHOID SURGERY      with complication of chronic bleeding for 6 weeks     INJECTION OF STEROID Right 12/6/2018    Procedure: INJECTION, STEROID Right SI Joint Block and Steroid Injection;  Surgeon: Jason Caldwell MD;  Location: [facility] OR;  Service: Neurosurgery;  Laterality: Right;  Procedure: Right SI Joint Block and Steroid Injection  Surgery Time: 30 MIN  LOS: 0  Anesthesia: MAC  Radiology: C-arm  Bed: [place] 4 Poster  Position: Prone    INJECTION OF STEROID Right 9/19/2019    Procedure: INJECTION, STEROID Procedure: Right SI joint block nd steroid injection;  Surgeon: Jason Caldwell MD;  Location: [facility];  Service: Neurosurgery;  Laterality: Right;  Procedure: Right SI joint block nd steroid injection  Surgery Time: 30 mins  LOS:   Anesthesia: General MAC  Radiology:C-arm  Bed: Regular Bed  Position: Prone    IRRIGATION AND DEBRIDEMENT OF UPPER EXTREMITY Left 4/7/2022    Procedure: IRRIGATION AND DEBRIDEMENT, UPPER EXTREMITY;  Surgeon: Kaye Solis MD;  Location: [facility];  Service: Orthopedics;  Laterality: Left;    JOINT REPLACEMENT      KNEE SURGERY      involving arthroscopic surgery to both knees    REPAIR OF EXTENSOR TENDON Left 4/7/2022    Procedure: REPAIR, TENDON, EXTENSOR thumb, EPB and EPL;  Surgeon: Kaye Solis MD;  Location: [facility];  Service: Orthopedics;  Laterality: Left;    SINUS SURGERY      left molar and sinus surgery for trigeminal neuralgia    SPINAL FUSION  06/22/2015    L3-L5 XLIF/TANA    TOTAL HIP ARTHROPLASTY  4/2012    Pt states he had total hip replacement on his left hip.    ULNAR NERVE TRANSPOSITION Left 12/16/2020    Procedure:  TRANSPOSITION, NERVE, ULNAR - left carpal and cubital tunnel releases;  Surgeon: Addi Bauer MD;  Location: [facility] OR;  Service: Orthopedics;  Laterality: Left;       Family History   Problem Relation Age of Onset    Stroke Mother 86    Colon cancer Mother         early/mid-60s    Uterine cancer Mother 77    Pancreatitis Brother         acute, now s/p nathalia    Diabetes Brother     Macular degeneration Brother     Other Brother         foot drop    Other Father 44        pituitary tumor- CA vs benign?    Heart attack Maternal Grandfather         suspected, 50s    Migraines Sister     Crohn's disease Sister         w/ assoc joint issues    Arthritis Sister     Tremor Daughter     Irritable bowel syndrome Son         leaning toward Crohn's dx    Neurological Disorders Son         nonspecific, benign fasciculations of calves    Tremor Son     Jaundice Grandchild     Other Maternal Uncle         memory issue    Other Maternal Uncle         memory issue    Cancer Maternal Cousin         origin? maybe thyroid? 40s?    Melanoma Neg Hx     Amblyopia Neg Hx     Blindness Neg Hx     Cataracts Neg Hx     Glaucoma Neg Hx     Hypertension Neg Hx     Retinal detachment Neg Hx     Strabismus Neg Hx     Thyroid disease Neg Hx     Allergic rhinitis Neg Hx     Allergies Neg Hx     Angioedema Neg Hx     Asthma Neg Hx     Eczema Neg Hx     Urticaria Neg Hx     Rhinitis Neg Hx     Immunodeficiency Neg Hx     Atopy Neg Hx        Social History     Socioeconomic History    Marital status:    Occupational History    Occupation: Psychotherpist    Tobacco Use    Smoking status: Never    Smokeless tobacco: Never   Substance and Sexual Activity    Alcohol use: Yes     Comment: occasion    Drug use: No    Sexual activity: Yes     Partners: Female       Allergies:  Alphagan [brimonidine], Coumadin [warfarin], and Oxycodone    Medications:    Current Outpatient Medications:     atorvastatin (LIPITOR) 40 MG tablet, Take 1 tablet  (40 mg total) by mouth once daily., Disp: 90 tablet, Rfl: 3    butalbital-acetaminophen-caffeine -40 mg (FIORICET, ESGIC) -40 mg per tablet, Take 1 tablet every day by oral route as needed for 30 days., Disp: 10 tablet, Rfl: 0    celecoxib (CELEBREX) 100 MG capsule, Take 100 mg by mouth., Disp: , Rfl:     clonazePAM (KLONOPIN) 0.5 MG tablet, Take 1 tablet by mouth twice a day as needed for anxiety, Disp: 60 tablet, Rfl: 5    ergocalciferol (VITAMIN D2) 50,000 unit Cap, Take 1 capsule (50,000 Units total) by mouth every 7 days., Disp: 12 capsule, Rfl: 3    fremanezumab-vfrm (AJOVY) 225 mg/1.5 mL injection, Inject 1.5 mLs (225 mg total) into the skin every 28 days., Disp: 1.5 mL, Rfl: 6    lamoTRIgine (LAMICTAL) 200 MG tablet, Take 1 tablet (200 mg total) by mouth once daily., Disp: 90 tablet, Rfl: 3    methocarbamoL (ROBAXIN) 750 MG Tab, TAKE 1 TABLET BY MOUTH AT NIGHT AS NEEDED FOR MUSCLE PAIN, Disp: 30 tablet, Rfl: 3    pregabalin (LYRICA) 25 MG capsule, TAKE 1 CAPSULE IN THE AFTERNOON AND 1 CAPSULE IN EVENING FOR 30 DAYS, Disp: 60 capsule, Rfl: 3    pregabalin (LYRICA) 25 MG capsule, Take 2 capsules at night. Take 1 capsule in the evening., Disp: 90 capsule, Rfl: 0    pregabalin (LYRICA) 75 MG capsule, Take 2 capsules by mouth at bedtime for 30 days, Disp: 60 capsule, Rfl: 3    pregabalin (LYRICA) 75 MG capsule, Take 2 capsules by mouth at night. 30 day supply., Disp: 60 capsule, Rfl: 0    selegiline (EMSAM) 9 mg/24 hr, Place 1 patch onto the skin once daily., Disp: 30 patch, Rfl: 11    tadalafiL (CIALIS) 5 MG tablet, Take 1 tablet (5 mg total) by mouth daily as needed for Erectile Dysfunction., Disp: 120 tablet, Rfl: 3    traZODone (DESYREL) 50 MG tablet, Take 1 tablet (50 mg total) by mouth every evening., Disp: 90 tablet, Rfl: 3    promethazine (PHENERGAN) 25 MG tablet, Take 1 tablet (25 mg total) by mouth every 6 (six) hours as needed for Nausea. (Patient not taking: Reported on 12/14/2022), Disp:  15 tablet, Rfl: 0    RABEprazole (ACIPHEX) 20 mg tablet, Take 1 tablet (20 mg total) by mouth 2 (two) times daily., Disp: 180 tablet, Rfl: 3    terazosin (HYTRIN) 5 MG capsule, Take 1 capsule (5 mg total) by mouth every evening., Disp: 90 capsule, Rfl: 3  No current facility-administered medications for this visit.    PHYSICAL EXAMINATION:  Physical Exam  Vitals and nursing note reviewed.   Constitutional:       General: He is awake.      Appearance: Normal appearance.   HENT:      Head: Normocephalic.      Right Ear: External ear normal.      Left Ear: External ear normal.      Nose: Nose normal.   Cardiovascular:      Rate and Rhythm: Normal rate.   Pulmonary:      Effort: Pulmonary effort is normal. No respiratory distress.   Abdominal:      Tenderness: There is no abdominal tenderness. There is no right CVA tenderness or left CVA tenderness.   Genitourinary:     Comments: Declined due to LUTS;  Musculoskeletal:         General: Normal range of motion.      Cervical back: Normal range of motion.   Skin:     General: Skin is warm and dry.   Neurological:      General: No focal deficit present.      Mental Status: He is alert and oriented to person, place, and time.   Psychiatric:         Mood and Affect: Mood normal.         Behavior: Behavior is cooperative.         LABS:      In office UA today was clear of active infection and blood.     The PVR in the office today done immediately after urination by the nurse was 6.        Lab Results   Component Value Date    PSA 6.3 (H) 10/31/2019    PSA 6.1 (H) 01/16/2017    PSA 4.5 (H) 02/21/2014    PSADIAG 10.0 (H) 10/07/2022    PSADIAG 9.4 (H) 07/12/2022    PSADIAG 8.9 (H) 05/03/2022             IMPRESSION:    Encounter Diagnoses   Name Primary?    Prostate cancer Yes    BPH with urinary obstruction     Lower urinary tract symptoms (LUTS)     Urinary frequency     Weak urinary stream          Assessment:       1. Prostate cancer    2. BPH with urinary obstruction    3.  Lower urinary tract symptoms (LUTS)    4. Urinary frequency    5. Weak urinary stream        Plan:         I spent 30 minutes with the patient of which more than half was spent in direct consultation with the patient in regards to our treatment and plan.  We addressed the office findings and recent labs.   Education and recommendations of today's plan of care including home remedies and needed follow up with PCP.   We discussed the chief complaint/LUTS and the possible contributory factors.   Changes as we age. BPH and PCa.  Reassurance no infection and low PVR.  Discussed other options with the older alpha blockers that we can use at lower doses. We reviewed new med; discussed the reason, benefits, expectations as well as risks, possible side effects.   Any concerns then stop taking and let me know.  Rx for Terazosin 5mg nightly  Ok to continue Cialis 5mg daily; try 10mg PRN possible firmer erections.  Recommended lifestyle modifications with proper, healthy diet, good hydration  reducing bladder irritants.   Benefits of regular exercise approved by PCP.  F/u 3 jeana.

## 2022-12-20 DIAGNOSIS — K64.4 INTERNAL AND EXTERNAL BLEEDING HEMORRHOIDS: Primary | ICD-10-CM

## 2022-12-20 DIAGNOSIS — K64.8 INTERNAL AND EXTERNAL BLEEDING HEMORRHOIDS: Primary | ICD-10-CM

## 2022-12-20 RX ORDER — HYDROCORTISONE ACETATE 25 MG/1
25 SUPPOSITORY RECTAL 2 TIMES DAILY
Qty: 40 SUPPOSITORY | Refills: 1 | Status: SHIPPED | OUTPATIENT
Start: 2022-12-20 | End: 2023-02-21

## 2022-12-20 RX ORDER — HYDROCORTISONE 25 MG/G
CREAM TOPICAL 2 TIMES DAILY
Qty: 28 G | Refills: 2 | Status: SHIPPED | OUTPATIENT
Start: 2022-12-20 | End: 2023-01-31

## 2022-12-21 ENCOUNTER — HOSPITAL ENCOUNTER (OUTPATIENT)
Dept: RADIOLOGY | Facility: HOSPITAL | Age: 72
Discharge: HOME | End: 2022-12-21
Attending: NEUROLOGICAL SURGERY
Payer: COMMERCIAL

## 2022-12-21 DIAGNOSIS — M53.3 SACROILIAC JOINT DYSFUNCTION OF RIGHT SIDE: ICD-10-CM

## 2022-12-21 DIAGNOSIS — M54.9 DORSALGIA, UNSPECIFIED: ICD-10-CM

## 2022-12-21 PROCEDURE — 72192 CT PELVIS W/O DYE: CPT | Mod: 26,,, | Performed by: RADIOLOGY

## 2022-12-21 PROCEDURE — 72192 CT PELVIS W/O DYE: CPT | Mod: TC

## 2022-12-21 PROCEDURE — 72192 CT PELVIS WITHOUT CONTRAST: ICD-10-PCS | Mod: 26,,, | Performed by: RADIOLOGY

## 2022-12-22 ENCOUNTER — PATIENT MESSAGE (OUTPATIENT)
Dept: NEUROSURGERY | Facility: CLINIC | Age: 72
End: 2022-12-22
Payer: COMMERCIAL

## 2022-12-22 DIAGNOSIS — G62.89 AXONAL SENSORIMOTOR NEUROPATHY: Primary | ICD-10-CM

## 2022-12-23 ENCOUNTER — OFFICE VISIT (OUTPATIENT)
Dept: NEUROSURGERY | Facility: CLINIC | Age: 72
End: 2022-12-23
Payer: COMMERCIAL

## 2022-12-23 DIAGNOSIS — M53.3 SACROILIAC JOINT DYSFUNCTION OF RIGHT SIDE: Primary | ICD-10-CM

## 2022-12-23 PROCEDURE — 1157F ADVNC CARE PLAN IN RCRD: CPT | Mod: CPTII,95,, | Performed by: NEUROLOGICAL SURGERY

## 2022-12-23 PROCEDURE — 3044F HG A1C LEVEL LT 7.0%: CPT | Mod: CPTII,95,, | Performed by: NEUROLOGICAL SURGERY

## 2022-12-23 PROCEDURE — 99024 PR POST-OP FOLLOW-UP VISIT: ICD-10-PCS | Mod: 95,,, | Performed by: NEUROLOGICAL SURGERY

## 2022-12-23 PROCEDURE — 99024 POSTOP FOLLOW-UP VISIT: CPT | Mod: 95,,, | Performed by: NEUROLOGICAL SURGERY

## 2022-12-23 PROCEDURE — 1157F PR ADVANCE CARE PLAN OR EQUIV PRESENT IN MEDICAL RECORD: ICD-10-PCS | Mod: CPTII,95,, | Performed by: NEUROLOGICAL SURGERY

## 2022-12-23 PROCEDURE — 3044F PR MOST RECENT HEMOGLOBIN A1C LEVEL <7.0%: ICD-10-PCS | Mod: CPTII,95,, | Performed by: NEUROLOGICAL SURGERY

## 2022-12-23 NOTE — PROGRESS NOTES
Established Patient - Audio Only Telehealth Visit     The patient location is: ***  The chief complaint leading to consultation is: ***  Visit type: Virtual visit with audio only (telephone)  Total time spent with patient: ***       The reason for the audio only service rather than synchronous audio and video virtual visit was related to technical difficulties or patient preference/necessity.     Each patient to whom I provide medical services by telemedicine is:  (1) informed of the relationship between the physician and patient and the respective role of any other health care provider with respect to management of the patient; and (2) notified that they may decline to receive medical services by telemedicine and may withdraw from such care at any time. Patient verbally consented to receive this service via voice-only telephone call.       HPI: ***     Assessment and plan:  ***                        This service was not originating from a related E/M service provided within the previous 7 days nor will  to an E/M service or procedure within the next 24 hours or my soonest available appointment.  Prevailing standard of care was able to be met in this audio-only visit.

## 2022-12-27 NOTE — PROGRESS NOTES
CHIEF COMPLAINT:  8-week followup of bilateral hand pain.    HISTORY OF PRESENT ILLNESS:  Mr. Rutherford is a 67-year-old established patient in   this clinic.  He states he is doing okay.  He has improved some; however, he   grabbed luggage wrong on Sunday and had a setback.  He was not wearing his   brace.  He states it became painful, 5/10.    PAST MEDICAL HISTORY, SOCIAL HISTORY, SURGICAL HISTORY, REVIEW OF SYSTEMS:  Per   electronic medical record.    PHYSICAL EXAMINATION:  VITAL SIGNS:  Please see computer entry.  GENERAL:  Alert and oriented x3, well developed, well nourished, in no apparent   distress, friendly individual, well groomed, appears stated age.  EXTREMITIES:  On examination of his hand, he is tender to palpation throughout   the flexor tendon.  It is negative for provocative examination for carpal   tunnels or cubital tunnel.  He has got great range of motion, it is just   painful.    The patient states it is also very painful for his osseous.    On examination today, he is tender to palpation over the wrist joint itself, not   necessarily just over the tendons.    PLAN:  We offered the patient an injection in his wrist, which he accepted.    This was done under ultrasonic guidance.  In addition, we will continue therapy   and weightbearing restrictions.      LES/IN  dd: 09/18/2017 11:27:16 (CDT)  td: 09/19/2017 03:13:05 (CDT)  Doc ID   #[id number]  Job ID #[id number]    CC:    yes

## 2022-12-29 ENCOUNTER — TELEPHONE (OUTPATIENT)
Dept: NEUROLOGY | Facility: CLINIC | Age: 72
End: 2022-12-29
Payer: COMMERCIAL

## 2022-12-29 NOTE — TELEPHONE ENCOUNTER
Pt calling to make appt about neuropathy. I called to schedule him with Dr. Davidson, however, I noticed he already had an appt scheduled on 2/6. I explained to him that Dr. Davidson does not have any earlier appts but I could get him in earlier with a different provider at [facility]. Pt stated that he would prefer to stay with Dr. Davidson so I explained that we would have to leave the appt date/time as is. Pt verbalized understanding.

## 2023-01-09 ENCOUNTER — PATIENT MESSAGE (OUTPATIENT)
Dept: NEUROLOGY | Facility: CLINIC | Age: 73
End: 2023-01-09

## 2023-01-09 ENCOUNTER — OFFICE VISIT (OUTPATIENT)
Dept: NEUROLOGY | Facility: CLINIC | Age: 73
End: 2023-01-09
Payer: COMMERCIAL

## 2023-01-09 ENCOUNTER — OFFICE VISIT (OUTPATIENT)
Dept: INTERNAL MEDICINE | Facility: CLINIC | Age: 73
End: 2023-01-09
Payer: COMMERCIAL

## 2023-01-09 VITALS
HEART RATE: 61 BPM | DIASTOLIC BLOOD PRESSURE: 76 MMHG | WEIGHT: 179.44 LBS | OXYGEN SATURATION: 95 % | SYSTOLIC BLOOD PRESSURE: 122 MMHG | HEIGHT: 68 IN | BODY MASS INDEX: 27.19 KG/M2

## 2023-01-09 DIAGNOSIS — E78.5 HYPERLIPIDEMIA, UNSPECIFIED HYPERLIPIDEMIA TYPE: Primary | ICD-10-CM

## 2023-01-09 DIAGNOSIS — G43.909 MIGRAINE WITHOUT STATUS MIGRAINOSUS, NOT INTRACTABLE, UNSPECIFIED MIGRAINE TYPE: ICD-10-CM

## 2023-01-09 DIAGNOSIS — M48.061 SPINAL STENOSIS, LUMBAR REGION, WITHOUT NEUROGENIC CLAUDICATION: ICD-10-CM

## 2023-01-09 DIAGNOSIS — G43.719 INTRACTABLE CHRONIC MIGRAINE WITHOUT AURA AND WITHOUT STATUS MIGRAINOSUS: Primary | ICD-10-CM

## 2023-01-09 DIAGNOSIS — G95.89 OTHER SPECIFIED DISEASES OF SPINAL CORD: ICD-10-CM

## 2023-01-09 DIAGNOSIS — G89.29 CHRONIC LOW BACK PAIN, UNSPECIFIED BACK PAIN LATERALITY, UNSPECIFIED WHETHER SCIATICA PRESENT: ICD-10-CM

## 2023-01-09 DIAGNOSIS — F33.1 MODERATE EPISODE OF RECURRENT MAJOR DEPRESSIVE DISORDER: ICD-10-CM

## 2023-01-09 DIAGNOSIS — G62.89 AXONAL SENSORIMOTOR NEUROPATHY: ICD-10-CM

## 2023-01-09 DIAGNOSIS — I70.0 AORTIC ATHEROSCLEROSIS: ICD-10-CM

## 2023-01-09 DIAGNOSIS — F41.9 ANXIETY: ICD-10-CM

## 2023-01-09 DIAGNOSIS — C61 PROSTATE CANCER: ICD-10-CM

## 2023-01-09 DIAGNOSIS — M54.50 CHRONIC LOW BACK PAIN, UNSPECIFIED BACK PAIN LATERALITY, UNSPECIFIED WHETHER SCIATICA PRESENT: ICD-10-CM

## 2023-01-09 DIAGNOSIS — F33.9 EPISODE OF RECURRENT MAJOR DEPRESSIVE DISORDER, UNSPECIFIED DEPRESSION EPISODE SEVERITY: ICD-10-CM

## 2023-01-09 DIAGNOSIS — M47.819 SPONDYLOSIS WITHOUT MYELOPATHY: ICD-10-CM

## 2023-01-09 PROCEDURE — 1101F PT FALLS ASSESS-DOCD LE1/YR: CPT | Mod: CPTII,S$GLB,, | Performed by: INTERNAL MEDICINE

## 2023-01-09 PROCEDURE — 99999 PR PBB SHADOW E&M-EST. PATIENT-LVL V: CPT | Mod: PBBFAC,,, | Performed by: INTERNAL MEDICINE

## 2023-01-09 PROCEDURE — 3078F DIAST BP <80 MM HG: CPT | Mod: CPTII,S$GLB,, | Performed by: INTERNAL MEDICINE

## 2023-01-09 PROCEDURE — 99999 PR PBB SHADOW E&M-EST. PATIENT-LVL V: ICD-10-PCS | Mod: PBBFAC,,, | Performed by: INTERNAL MEDICINE

## 2023-01-09 PROCEDURE — 99215 PR OFFICE/OUTPT VISIT, EST, LEVL V, 40-54 MIN: ICD-10-PCS | Mod: 95,,, | Performed by: NURSE PRACTITIONER

## 2023-01-09 PROCEDURE — 3074F PR MOST RECENT SYSTOLIC BLOOD PRESSURE < 130 MM HG: ICD-10-PCS | Mod: CPTII,S$GLB,, | Performed by: INTERNAL MEDICINE

## 2023-01-09 PROCEDURE — 3008F BODY MASS INDEX DOCD: CPT | Mod: CPTII,S$GLB,, | Performed by: INTERNAL MEDICINE

## 2023-01-09 PROCEDURE — 1159F PR MEDICATION LIST DOCUMENTED IN MEDICAL RECORD: ICD-10-PCS | Mod: CPTII,95,, | Performed by: NURSE PRACTITIONER

## 2023-01-09 PROCEDURE — 3008F PR BODY MASS INDEX (BMI) DOCUMENTED: ICD-10-PCS | Mod: CPTII,S$GLB,, | Performed by: INTERNAL MEDICINE

## 2023-01-09 PROCEDURE — 1125F PR PAIN SEVERITY QUANTIFIED, PAIN PRESENT: ICD-10-PCS | Mod: CPTII,S$GLB,, | Performed by: INTERNAL MEDICINE

## 2023-01-09 PROCEDURE — 3288F PR FALLS RISK ASSESSMENT DOCUMENTED: ICD-10-PCS | Mod: CPTII,S$GLB,, | Performed by: INTERNAL MEDICINE

## 2023-01-09 PROCEDURE — 1125F AMNT PAIN NOTED PAIN PRSNT: CPT | Mod: CPTII,S$GLB,, | Performed by: INTERNAL MEDICINE

## 2023-01-09 PROCEDURE — 3288F FALL RISK ASSESSMENT DOCD: CPT | Mod: CPTII,S$GLB,, | Performed by: INTERNAL MEDICINE

## 2023-01-09 PROCEDURE — 1160F RVW MEDS BY RX/DR IN RCRD: CPT | Mod: CPTII,95,, | Performed by: NURSE PRACTITIONER

## 2023-01-09 PROCEDURE — 1101F PR PT FALLS ASSESS DOC 0-1 FALLS W/OUT INJ PAST YR: ICD-10-PCS | Mod: CPTII,S$GLB,, | Performed by: INTERNAL MEDICINE

## 2023-01-09 PROCEDURE — 3074F SYST BP LT 130 MM HG: CPT | Mod: CPTII,S$GLB,, | Performed by: INTERNAL MEDICINE

## 2023-01-09 PROCEDURE — 1160F PR REVIEW ALL MEDS BY PRESCRIBER/CLIN PHARMACIST DOCUMENTED: ICD-10-PCS | Mod: CPTII,S$GLB,, | Performed by: INTERNAL MEDICINE

## 2023-01-09 PROCEDURE — 99214 PR OFFICE/OUTPT VISIT, EST, LEVL IV, 30-39 MIN: ICD-10-PCS | Mod: S$GLB,,, | Performed by: INTERNAL MEDICINE

## 2023-01-09 PROCEDURE — 99214 OFFICE O/P EST MOD 30 MIN: CPT | Mod: S$GLB,,, | Performed by: INTERNAL MEDICINE

## 2023-01-09 PROCEDURE — 1160F RVW MEDS BY RX/DR IN RCRD: CPT | Mod: CPTII,S$GLB,, | Performed by: INTERNAL MEDICINE

## 2023-01-09 PROCEDURE — 1159F MED LIST DOCD IN RCRD: CPT | Mod: CPTII,95,, | Performed by: NURSE PRACTITIONER

## 2023-01-09 PROCEDURE — 99215 OFFICE O/P EST HI 40 MIN: CPT | Mod: 95,,, | Performed by: NURSE PRACTITIONER

## 2023-01-09 PROCEDURE — 1159F PR MEDICATION LIST DOCUMENTED IN MEDICAL RECORD: ICD-10-PCS | Mod: CPTII,S$GLB,, | Performed by: INTERNAL MEDICINE

## 2023-01-09 PROCEDURE — 3078F PR MOST RECENT DIASTOLIC BLOOD PRESSURE < 80 MM HG: ICD-10-PCS | Mod: CPTII,S$GLB,, | Performed by: INTERNAL MEDICINE

## 2023-01-09 PROCEDURE — 1160F PR REVIEW ALL MEDS BY PRESCRIBER/CLIN PHARMACIST DOCUMENTED: ICD-10-PCS | Mod: CPTII,95,, | Performed by: NURSE PRACTITIONER

## 2023-01-09 PROCEDURE — 1157F PR ADVANCE CARE PLAN OR EQUIV PRESENT IN MEDICAL RECORD: ICD-10-PCS | Mod: CPTII,95,, | Performed by: NURSE PRACTITIONER

## 2023-01-09 PROCEDURE — 1157F ADVNC CARE PLAN IN RCRD: CPT | Mod: CPTII,95,, | Performed by: NURSE PRACTITIONER

## 2023-01-09 PROCEDURE — 1157F PR ADVANCE CARE PLAN OR EQUIV PRESENT IN MEDICAL RECORD: ICD-10-PCS | Mod: CPTII,S$GLB,, | Performed by: INTERNAL MEDICINE

## 2023-01-09 PROCEDURE — 1159F MED LIST DOCD IN RCRD: CPT | Mod: CPTII,S$GLB,, | Performed by: INTERNAL MEDICINE

## 2023-01-09 PROCEDURE — 1157F ADVNC CARE PLAN IN RCRD: CPT | Mod: CPTII,S$GLB,, | Performed by: INTERNAL MEDICINE

## 2023-01-09 NOTE — PROGRESS NOTES
Subjective:       Patient ID: Raffy Rutherford Jr. is a 72 y.o. male.    Chief Complaint: Follow-up    Patient comes in for follow-up of hyperlipidemia after changing to new medication.  He is due for updated labs to see if it is working.  No side effects that he is aware but we will update the blood work.    He also had a form to complete for a skiing program for handicapped her disabled people.  Because of his neuropathy, prior back surgeries, foot drop, leg weakness, he would like to participate.  The pharmacy simple basically asking to verify the patient's medical history.  He asked me to take over writing his pain medicine which he uses on a regular basis but has not escalated the dose.   reviewed.  We discussed control nature of med and he expressed understanding.  Will follow up with time.  He may be trying injections and ablation.    Other meds and diagnoses reviewed    Review of Systems   Cardiovascular:  Negative for chest pain.   Gastrointestinal:  Negative for abdominal pain.   Musculoskeletal:  Positive for arthralgias, back pain and gait problem.   Neurological:  Positive for weakness.         Past Medical History:   Diagnosis Date    Allergy     Arthritis     Back pain     after trauma beginning in 89    Cataract     Chronic pain     neck and left shoulder    Cluster headache 2013    Colon polyps 2007 2007-2019: TA x5, HP x3    Degenerative disc disease     Depression     Diverticulitis 12/2013    Fibromyalgia 2013    GERD (gastroesophageal reflux disease)     Hepatitis 1970's    A    History of prostate biopsy 2002    Hyperlipidemia     Joint pain     Prostate cancer 07/2021    Sleep apnea     Thyroid nodule 7/16/2014    Tubular adenoma of colon 2007    5 removed [id number]    Visual disturbance 2012    problems after cataract surgery     Past Surgical History:   Procedure Laterality Date    BACK SURGERY      CARPAL TUNNEL RELEASE Right 5/18/2021    Procedure: RELEASE, CARPAL TUNNEL;  Surgeon:  Kaye Solis MD;  Location: [facility];  Service: Orthopedics;  Laterality: Right;    CATARACT EXTRACTION W/  INTRAOCULAR LENS IMPLANT Bilateral     CHOLECYSTECTOMY      COLONOSCOPY N/A 12/17/2019    Normal - Repeat 5yrs    COLONOSCOPY  2007 2007 TA x2, 2011 TA x3, 2014 HP x3, 2019 normal    COSMETIC SURGERY  2/10/2015    Direct mid-forehead brow lift    COSMETIC SURGERY  2/10/2015    Bilateral upper lid blepahroplasty    CYSTOSCOPY WITH URETEROSCOPY, RETROGRADE PYELOGRAPHY, AND INSERTION OF STENT Left 8/19/2020    Procedure: CYSTOSCOPY, WITH RETROGRADE PYELOGRAM AND URETERAL STENT INSERTION;  Surgeon: Katelynn George MD;  Location: [facility];  Service: Urology;  Laterality: Left;    ESOPHAGOGASTRODUODENOSCOPY N/A 12/17/2019    Procedure: ESOPHAGOGASTRODUODENOSCOPY (EGD);  Surgeon: Amadou Hardin MD;  Location[facility]  Service: Endoscopy;  Laterality: N/A;    EYE SURGERY      FUSION OF LUMBAR SPINE BY ANTERIOR APPROACH N/A 8/20/2020    Procedure: FUSION, SPINE, LUMBAR, ANTERIOR APPROACH L5-S1 ALIF Stand Alone;  Surgeon: Jason Caldwell MD;  Location: [facility];  Service: Neurosurgery;  Laterality: N/A;    HEMORRHOID SURGERY      with complication of chronic bleeding for 6 weeks     INJECTION OF STEROID Right 12/6/2018    Procedure: INJECTION, STEROID Right SI Joint Block and Steroid Injection;  Surgeon: Jason Caldwell MD;  Location: [facility];  Service: Neurosurgery;  Laterality: Right;  Procedure: Right SI Joint Block and Steroid Injection  Surgery Time: 30 MIN  LOS: 0  Anesthesia: MAC  Radiology: C-arm  Bed: [place] [id number] Poster  Position: Prone    INJECTION OF STEROID Right 9/19/2019    Procedure: INJECTION, STEROID Procedure: Right SI joint block nd steroid injection;  Surgeon: Jason Caldwell MD;  Location: [facility];  Service: Neurosurgery;  Laterality: Right;  Procedure: Right SI joint block nd steroid injection  Surgery Time: 30 mins  LOS:   Anesthesia: General MAC  Radiology:C-arm  Bed: [facility]  Bed  Position: Prone    IRRIGATION AND DEBRIDEMENT OF UPPER EXTREMITY Left 4/7/2022    Procedure: IRRIGATION AND DEBRIDEMENT, UPPER EXTREMITY;  Surgeon: Kaye Solis MD;  Location: [facility] OR;  Service: Orthopedics;  Laterality: Left;    JOINT REPLACEMENT      KNEE SURGERY      involving arthroscopic surgery to both knees    REPAIR OF EXTENSOR TENDON Left 4/7/2022    Procedure: REPAIR, TENDON, EXTENSOR thumb, EPB and EPL;  Surgeon: Kaye Solis MD;  Location: [facility] OR;  Service: Orthopedics;  Laterality: Left;    SINUS SURGERY      left molar and sinus surgery for trigeminal neuralgia    SPINAL FUSION  06/22/2015    L3-L5 XLIF/TANA    TOTAL HIP ARTHROPLASTY  4/2012    Pt states he had total hip replacement on his left hip.    ULNAR NERVE TRANSPOSITION Left 12/16/2020    Procedure: TRANSPOSITION, NERVE, ULNAR - left carpal and cubital tunnel releases;  Surgeon: Addi Bauer MD;  Location: [facility] OR;  Service: Orthopedics;  Laterality: Left;      Patient Active Problem List   Diagnosis    Depression    Hyperlipidemia    Chronic pain    Adenomatous polyp    GERD (gastroesophageal reflux disease)    Back pain    Sleep apnea    Visual disturbances    Spondylosis without myelopathy    Degeneration of lumbar or lumbosacral intervertebral disc    Spinal stenosis, lumbar region, without neurogenic claudication    Thoracic or lumbosacral neuritis or radiculitis, unspecified    Displacement of lumbar intervertebral disc without myelopathy    Acquired spondylolisthesis    Lumbago    Status post cataract extraction and insertion of intraocular lens - Both Eyes    Fibromyalgia    OP (osteoporosis)    Compression fracture of T12 vertebra    Diverticulitis large intestine    Osteoarthritis    Lower back pain    Lower extremity pain    Muscle weakness    Range of motion deficit    Facet joint disease of cervical region    Occipital neuralgia    Chronic migraine without aura, with intractable migraine, so stated, with status  migrainosus    Cervical radiculopathy    Paroxysmal hemicrania    Sciatic nerve pain    Chronic LBP    DJD (degenerative joint disease) of lumbar spine    Chronic neck pain    Right lumbar radiculopathy    Thyroid nodule    Carpal tunnel syndrome of right wrist    Paresthesia    Degenerative disc disease    S/P lumbar spinal fusion    Right wrist tendinitis    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    Headache around the eyes    Closed fracture of left distal radius    Bilateral foot-drop    Idiopathic peripheral neuropathy    Sacroiliac joint dysfunction of right side    SI (sacroiliac) joint dysfunction    Episodic migraine without status migrainosus, not intractable    Lumbar disc herniation with radiculopathy    Anxiety about health    MDD (major depressive disorder), recurrent episode, moderate    Anxiety    History of colon polyps    Iron deficiency anemia    DDD (degenerative disc disease), lumbosacral    Spondylolisthesis at L5-S1 level    CTS (carpal tunnel syndrome)    Pain in left hand    Right carpal tunnel syndrome    Decreased range of motion of neck    Poor posture    Decreased strength of upper extremity    Prostate cancer    Impaired gait    Balance problems    Genetic disorder    Bilateral carotid artery stenosis    Other specified diseases of spinal cord    Chronic pain of left hand    Pain in thumb joint with movement of left hand    Decreased activities of daily living (ADL)        Objective:      Physical Exam  Constitutional:       General: He is not in acute distress.     Appearance: He is well-developed.   HENT:      Head: Normocephalic and atraumatic.      Right Ear: Tympanic membrane, ear canal and external ear normal.      Left Ear: Tympanic membrane, ear canal and external ear normal.      Mouth/Throat:      Pharynx: No oropharyngeal exudate or posterior oropharyngeal erythema.   Eyes:      General: No scleral icterus.     Conjunctiva/sclera: Conjunctivae normal.       Pupils: Pupils are equal, round, and reactive to light.   Neck:      Thyroid: No thyromegaly.      Comments: No supraclavicular nodes palpated  Cardiovascular:      Rate and Rhythm: Normal rate and regular rhythm.      Pulses: Normal pulses.      Heart sounds: Normal heart sounds. No murmur heard.  Pulmonary:      Effort: Pulmonary effort is normal.      Breath sounds: Normal breath sounds. No wheezing.   Abdominal:      General: Bowel sounds are normal.      Palpations: Abdomen is soft. There is no mass.      Tenderness: There is no abdominal tenderness.   Musculoskeletal:         General: No tenderness.      Cervical back: Normal range of motion and neck supple.      Right lower leg: No edema.      Left lower leg: No edema.   Lymphadenopathy:      Cervical: No cervical adenopathy.   Skin:     Coloration: Skin is not jaundiced or pale.   Neurological:      General: No focal deficit present.      Mental Status: He is alert and oriented to person, place, and time.      Motor: Weakness present.      Coordination: Coordination abnormal.   Psychiatric:         Mood and Affect: Mood normal.         Behavior: Behavior normal.       Assessment:       Problem List Items Addressed This Visit          Neuro    Spondylosis without myelopathy    Relevant Orders    Ambulatory referral/consult to Physical/Occupational Therapy    Comprehensive Metabolic Panel    Spinal stenosis, lumbar region, without neurogenic claudication    Relevant Orders    Ambulatory referral/consult to Physical/Occupational Therapy    Comprehensive Metabolic Panel       Cardiac/Vascular    Hyperlipidemia - Primary    Relevant Orders    Lipid Panel    Comprehensive Metabolic Panel       Oncology    Prostate cancer    Relevant Orders    Comprehensive Metabolic Panel         Plan:         Raffy was seen today for follow-up.    Diagnoses and all orders for this visit:    Hyperlipidemia, unspecified hyperlipidemia type  -     Lipid Panel; Future  -      "Comprehensive Metabolic Panel; Future    Prostate cancer  -     Comprehensive Metabolic Panel; Future    Spinal stenosis, lumbar region, without neurogenic claudication  -     Ambulatory referral/consult to Physical/Occupational Therapy; Future  -     Comprehensive Metabolic Panel; Future    Spondylosis without myelopathy  -     Ambulatory referral/consult to Physical/Occupational Therapy; Future  -     Comprehensive Metabolic Panel; Future             Review studies  Form completed  Follow-up in a few months sooner p.r.n.        Portions of this note may have been created with voice recognition software. Occasional "wrong-word" or "sound-a-like" substitutions may have occurred due to the inherent limitations of voice recognition software. Please, read the note carefully and recognize, using context, where substitutions have occurred.  "

## 2023-01-09 NOTE — PROGRESS NOTES
NEW PATIENT CONSULT  SUBJECTIVE:  Patient ID: Raffy Rutherford Jr.   MRN: [id number]  Referred By: Aaareferral Self  Chief Complaint: Consult    The patient location is: in [place]   The chief complaint leading to consultation is: Consult  Visit type: Virtual visit with synchronous audio and video  Total time spent with patient: 24 min  Each patient to whom he or she provides medical services by telemedicine is:  (1) informed of the relationship between the physician and patient and the respective role of any other health care provider with respect to management of the patient; and (2) notified that he or she may decline to receive medical services by telemedicine and may withdraw from such care at any time.    History of Present Illness:   72 y.o. male with headaches, chronic LBP, cervical radiculopathy, anxiety, depression, WINNIE, hx of prostate cancer, who presents for evaluation of headaches via virtual visit.  Patient is established with neurology clinic, previous patient of DESTINY Norman and Dr. Millan, he is a new patient to me.    Currently suffering with headaches at least 1-2 times per week each of which last 2-4 days in duration.  Long history of headahces, beginning around 18 yo, had multiple sinus surgeries thinking headaches were sinus related, no improvement.      Headaches are described as a moderate to severe, stabbing/searing pain beginning left retro-orbitalis, extending towards the occipitalis and into his shoulder/left arm/hand.  Headaches can last anywhere from an hour when successfully treated with fioricet or excedrin, but can last up to 4 days in duration, on average headaches are lasting about a day in duration.  Headaches are often present upon waking.   Associated symptoms include fatigue, crepitus in left shoulder, congested (on left side), more trouble concentrating than norm, photophobia, phonophobia.  Currently experiencing some sort of headache on 20-25/30 days, with migraines occurring on  12-15/30 days.    At last visit with DESTINY Norman, she restarted nurtec 75 mg odt and referred him to me for consideration of Botox. Nurtec every other day was ineffective (though was effective in the past for him).  He was then switched to ajovy 225 mg SQ monthly, was waiting for today's visit to decide whether or not to start using ajovy.   Takes lyrica as prescribed by outside pain management, for chronic back pain.   He is prescribed lamotrigine 200 mg daily for anxiety/depression.  Takes trazodone 50 mg nightly for insomnia.     Notes from DESTINY Norman:  HPI:   Mr. Raffy Rutherford Jr. is a 72 y.o. male presenting for evaluation regarding chronic low back pain. On chart review he was previously seen by Dr. Millan for both bilateral neuropathy and headache pain. He received an EMG with Dr. Millan on 03/15/2022 which showed evidence of L5/S1 radiculopahties, worse on the right; and moderate chronic reinnervation changes noted in the R vastus medialis. His current therapy regimen is Lyrica 200mg QHS. He notes he was previously seen by neurosurgery and instructed to receive an MRI lumbar spine by Dr. Caldwell but was unsure why this was ordered. We discussed that this is likely due to the evidence on the EMG indicating his bilateral foot drop may be due to a lumbar spine issue at L5/S1 level.      Headache History:   Onset- Teenager   Frequency- 5-6 per week  Duration- All day   Location- L eye and radiates to his L jaw and then toward his L shoulder and arm  Associated symptoms- Nasal blockage on the L side, + photophobia,   Denies any aura, denies nausea/vomiting   Alleviating Factors- Half Fioricet can occasionally relieve his headaches  Aggravating Factors- Smells trigger his headaches and stress/ poor sleep, weather      Of note- patient went to [place] and found in the 3 weeks he was there he only had 2-3 headaches which were easily relieved     He states that he is also concerned regarding his headaches for which he is  currently taking Ubrelvy QOD and Fioricet for abortive therapy.       Headache:  Type: iron hot stab like pain   Severity: 9/10  Location: left eye, forehead , cheek into left shoulder and into the back   Triggers: Particular scents, looking at screen for a long time   Frequency: worse in the last week, but otherwise 8-9/month   Duration:  Days if he does not take medication (2-3 days) can have a cycles of headaches for several weeks , sometimes can go 4 weeks without headache   Aura: none   Photophobia: not as much but does endorse turning the lights off or worsening of headache when he looks at his phone  Phonophobia: ++  Nausea/ vomiting: rarely gets nauseous   Aggravating factors: none   Relieving factors: Medications  Inhales an oil- mydab which has given him significant benefit  Fioricet- none   Ubrelvy- worked okay but Nurtec works better  Voltaren- he takes this for joint pains.   Excedrin- none   Aspirin makes tinnitus worse   Norco- 3 times a day - 1/2 pill Norco in am , 1/2 pill in the afternoon 1/2 + 3/4th pill in the evening. Pain worsens when he tries to taper off his pain medication  He snores at night, he has sleep apnea, he was on CPAP but not very compliant because he states he took the mask off in his sleep. He has tried different masks with no benefit.     Treatments Tried and Response  Nurtec  lyrica  Atogepant  Baclofen   Fioricet  Celebrex  Emgality 09/08/2020 failed  Aimovig  Fioricet   Diamox   Cymbalta   Klonopin   Valium   [place]  Lamictal   Gabapentin   Indometacin   Dilaudid  Verapamil  Nurtec   Ubrelvy   Trazodone  Ajovy   Botox - recommended today     Current Medications:    atorvastatin (LIPITOR) 40 MG tablet, Take 1 tablet (40 mg total) by mouth once daily., Disp: 90 tablet, Rfl: 3    butalbital-acetaminophen-caffeine -40 mg (FIORICET, ESGIC) -40 mg per tablet, Take 1 tablet by mouth every day as needed for 30 days., Disp: 30 tablet, Rfl: 0    celecoxib (CELEBREX) 100 MG  capsule, Take 100 mg by mouth., Disp: , Rfl:     clonazePAM (KLONOPIN) 0.5 MG tablet, Take 1 tablet by mouth twice a day as needed for anxiety, Disp: 60 tablet, Rfl: 5    ergocalciferol (VITAMIN D2) 50,000 unit Cap, Take 1 capsule (50,000 Units total) by mouth every 7 days., Disp: 12 capsule, Rfl: 3    fremanezumab-vfrm (AJOVY) 225 mg/1.5 mL injection, Inject 1.5 mLs (225 mg total) into the skin every 28 days., Disp: 1.5 mL, Rfl: 6    HYDROcodone-acetaminophen (NORCO) 5-325 mg per tablet, Take 1 tablet 4 times a day by oral route as needed for 30 days., Disp: 120 tablet, Rfl: 0    hydrocortisone (ANUSOL-HC) 25 mg suppository, Place 1 suppository (25 mg total) rectally 2 (two) times daily., Disp: 40 suppository, Rfl: 1    hydrocortisone 2.5 % cream, Apply topically 2 (two) times daily., Disp: 28 g, Rfl: 2    lamoTRIgine (LAMICTAL) 200 MG tablet, Take 1 tablet (200 mg total) by mouth once daily., Disp: 90 tablet, Rfl: 3    pregabalin (LYRICA) 25 MG capsule, Take 2 capsules at night. Take 1 capsule in the evening., Disp: 90 capsule, Rfl: 0    pregabalin (LYRICA) 75 MG capsule, Take 2 capsules by mouth at night. 30 day supply., Disp: 60 capsule, Rfl: 0    RABEprazole (ACIPHEX) 20 mg tablet, Take 1 tablet (20 mg total) by mouth 2 (two) times daily., Disp: 180 tablet, Rfl: 3    selegiline (EMSAM) 9 mg/24 hr, Place 1 patch onto the skin once daily., Disp: 30 patch, Rfl: 11    tadalafiL (CIALIS) 5 MG tablet, Take 1 tablet (5 mg total) by mouth daily as needed for Erectile Dysfunction., Disp: 120 tablet, Rfl: 3    terazosin (HYTRIN) 5 MG capsule, Take 1 capsule (5 mg total) by mouth every evening., Disp: 90 capsule, Rfl: 3    traZODone (DESYREL) 50 MG tablet, Take 1 tablet (50 mg total) by mouth every evening., Disp: 90 tablet, Rfl: 3    Review of Systems - A review of 10+ systems was conducted with pertinent positive and negative findings documented in HPI with all other systems reviewed and negative.    PFSH: Past medical,  family, and social history reviewed as documented in chart with pertinent positive medical, family, and social history detailed in HPI.    OBJECTIVE:  Vitals:  There were no vitals taken for this visit.     Physical Exam:  Constitutional: he appears well-developed and well-nourished. he is well groomed. NAD    Review of Data:   Notes from Dr. Monty Choudhury, internal medicine, neurosurgery reviewed   Labs:  Office Visit on 12/14/2022   Component Date Value Ref Range Status    POC Residual Urine Volume 12/14/2022 6  0 - 100 mL Final    Color, UA 12/14/2022 Yellow   Final    pH, UA 12/14/2022 8   Final    WBC, UA 12/14/2022 neg   Final    Nitrite, UA 12/14/2022 neg   Final    Protein, POC 12/14/2022 neg   Final    Glucose, UA 12/14/2022 neg   Final    Ketones, UA 12/14/2022 neg   Final    Urobilinogen, UA 12/14/2022 neg   Final    Bilirubin, POC 12/14/2022 neg   Final    Blood, UA 12/14/2022 neg   Final    Clarity, UA 12/14/2022 Clear   Final   Lab Visit on 10/07/2022   Component Date Value Ref Range Status    PSA Diagnostic 10/07/2022 10.0 (H)  0.00 - 4.00 ng/mL Final   Lab Visit on 07/12/2022   Component Date Value Ref Range Status    PSA Diagnostic 07/12/2022 9.4 (H)  0.00 - 4.00 ng/mL Final    Cholesterol 07/12/2022 252 (H)  120 - 199 mg/dL Final    Triglycerides 07/12/2022 153 (H)  30 - 150 mg/dL Final    HDL 07/12/2022 81 (H)  40 - 75 mg/dL Final    LDL Cholesterol 07/12/2022 140.4  63.0 - 159.0 mg/dL Final    HDL/Cholesterol Ratio 07/12/2022 32.1  20.0 - 50.0 % Final    Total Cholesterol/HDL Ratio 07/12/2022 3.1  2.0 - 5.0 Final    Non-HDL Cholesterol 07/12/2022 171  mg/dL Final    TSH 07/12/2022 1.025  0.400 - 4.000 uIU/mL Final    Hemoglobin A1C 07/12/2022 5.6  4.0 - 5.6 % Final    Estimated Avg Glucose 07/12/2022 114  68 - 131 mg/dL Final     Imaging:  No results found. However, due to the size of the patient record, not all encounters were searched. Please check Results Review for a complete set of  results.  Note: I have independently reviewed any/all imaging/labs/tests and agree with the report (s) as documented.  Any discrepancies will be as noted/demarcated by free text.  GARO BERGER 1/9/2023    ASSESSMENT:  1. Intractable chronic migraine without aura and without status migrainosus    2. Axonal sensorimotor neuropathy    3. Migraine without status migrainosus, not intractable, unspecified migraine type    4. Chronic low back pain, unspecified back pain laterality, unspecified whether sciatica present    5. Anxiety    6. Episode of recurrent major depressive disorder, unspecified depression episode severity      Medical Decision Making:  BOTOX  The patient has chronic migraines ( G43.719) and suffers from headaches more than 15 days per month, each lasting more than 4 hours with at least 8 attacks that meet criteria for migraine. She has tried multiple medications including but not limited to lyrica, cymbalta, verapamil, lamotrigine, ajovy, aimovig, emgality, gabapentin, nurtec, ubrelvy, qulipta, gabapentin, topiramate (see full list above). The patient is an ideal candidate for Botox. After treatment, I expect 50%  improvement in the patient's symptoms. A reduction of at least 7 days per month and the number of cumulative hours suffering with headaches as well as at least 100 total hours affected with migraine per month. After obtaining informed consent and under aseptic technique, a total of 155 units of botulinum toxin type A will be injected in the following muscles: Procerus 5 units,  5 units bilaterally, frontalis 20 units, temporalis 20 units bilaterally, occipitalis 15 units, upper cervical paraspinals 10 units bilaterally and trapezius 15 units bilaterally. The patient will receive a total of 155 units in 31 sites. Frequency of treatment is every 12 weeks unless no response to the treatments, at which time we will discontinue the injections.    PLAN:  - Discussed symptoms appear to be  consistent with chronic migraine complicated by chronic pain, anxiety/depression, insomnia, discussed treatment options and patient agreed with the following plan:  - Seek approval for Botox for chronic migraine   - Recommended he start Ajovy 225 mg SQ monthly as previously prescribed by my colleague DESTINY Norman   - Limit use of fioricet to no more than 6-8 days per month to avoid rebound/medication overuse headache   - Continue lamotrigine 200 mg and trazodone 50 mg daily as prescribed by PCP for depression/anxiety, likely with dual benefit on headaches   - Chronic LBP - continue lyrica as directed by outside pain management Dr. Olivarez   - Could consider functional restoration program in the future   - RTC in 1 month to start Botox      Orders Placed This Encounter    Prior authorization Order     40 minutes of total time spent on the encounter, which includes 24 minutes face to face time and 16 minutes non face to face time preparing to see the patient (eg, review of tests), obtaining and/or reviewing separately obtained history, documenting clinical information in the electronic or other health record, independently interpreting results (not separately reported) and communicating results to the patient/family/caregiver or care coordination (not separately reported).     Questions and concerns were sought and answered to the patient's stated verbal satisfaction.  The patient verbalizes understanding and agreement with the above stated treatment plan.     CC: MD Macie Edmondson, FNP-C  [facility]  [phone]    Dr. Parada was available during today's encounter.

## 2023-01-10 ENCOUNTER — LAB VISIT (OUTPATIENT)
Dept: LAB | Facility: HOSPITAL | Age: 73
End: 2023-01-10
Attending: INTERNAL MEDICINE
Payer: COMMERCIAL

## 2023-01-10 DIAGNOSIS — E78.5 HYPERLIPIDEMIA, UNSPECIFIED HYPERLIPIDEMIA TYPE: ICD-10-CM

## 2023-01-10 DIAGNOSIS — C61 PROSTATE CANCER: ICD-10-CM

## 2023-01-10 DIAGNOSIS — M47.819 SPONDYLOSIS WITHOUT MYELOPATHY: ICD-10-CM

## 2023-01-10 DIAGNOSIS — M48.061 SPINAL STENOSIS, LUMBAR REGION, WITHOUT NEUROGENIC CLAUDICATION: ICD-10-CM

## 2023-01-10 LAB
ALBUMIN SERPL BCP-MCNC: 4.3 G/DL (ref 3.5–5.2)
ALP SERPL-CCNC: 37 U/L (ref 55–135)
ALT SERPL W/O P-5'-P-CCNC: 22 U/L (ref 10–44)
ANION GAP SERPL CALC-SCNC: 7 MMOL/L (ref 8–16)
AST SERPL-CCNC: 22 U/L (ref 10–40)
BILIRUB SERPL-MCNC: 0.7 MG/DL (ref 0.1–1)
BUN SERPL-MCNC: 15 MG/DL (ref 8–23)
CALCIUM SERPL-MCNC: 9.8 MG/DL (ref 8.7–10.5)
CHLORIDE SERPL-SCNC: 103 MMOL/L (ref 95–110)
CHOLEST SERPL-MCNC: 189 MG/DL (ref 120–199)
CHOLEST/HDLC SERPL: 3 {RATIO} (ref 2–5)
CO2 SERPL-SCNC: 30 MMOL/L (ref 23–29)
CREAT SERPL-MCNC: 0.8 MG/DL (ref 0.5–1.4)
EST. GFR  (NO RACE VARIABLE): >60 ML/MIN/1.73 M^2
GLUCOSE SERPL-MCNC: 99 MG/DL (ref 70–110)
HDLC SERPL-MCNC: 62 MG/DL (ref 40–75)
HDLC SERPL: 32.8 % (ref 20–50)
LDLC SERPL CALC-MCNC: 103.4 MG/DL (ref 63–159)
NONHDLC SERPL-MCNC: 127 MG/DL
POTASSIUM SERPL-SCNC: 4.5 MMOL/L (ref 3.5–5.1)
PROT SERPL-MCNC: 7.5 G/DL (ref 6–8.4)
SODIUM SERPL-SCNC: 140 MMOL/L (ref 136–145)
TRIGL SERPL-MCNC: 118 MG/DL (ref 30–150)

## 2023-01-10 PROCEDURE — 80061 LIPID PANEL: CPT | Performed by: INTERNAL MEDICINE

## 2023-01-10 PROCEDURE — 36415 COLL VENOUS BLD VENIPUNCTURE: CPT | Performed by: INTERNAL MEDICINE

## 2023-01-10 PROCEDURE — 80053 COMPREHEN METABOLIC PANEL: CPT | Performed by: INTERNAL MEDICINE

## 2023-01-16 ENCOUNTER — PATIENT MESSAGE (OUTPATIENT)
Dept: INTERNAL MEDICINE | Facility: CLINIC | Age: 73
End: 2023-01-16
Payer: COMMERCIAL

## 2023-01-29 DIAGNOSIS — F41.9 ANXIETY: ICD-10-CM

## 2023-01-29 DIAGNOSIS — F33.1 MODERATE EPISODE OF RECURRENT MAJOR DEPRESSIVE DISORDER: Primary | ICD-10-CM

## 2023-01-29 DIAGNOSIS — K21.00 GERD WITH ESOPHAGITIS: ICD-10-CM

## 2023-01-29 NOTE — TELEPHONE ENCOUNTER
No new care gaps identified.  [facility] Embedded Care Gaps. Reference number: [id number]. 1/29/2023   10:04:43 AM CST

## 2023-01-30 ENCOUNTER — PATIENT MESSAGE (OUTPATIENT)
Dept: OTOLARYNGOLOGY | Facility: CLINIC | Age: 73
End: 2023-01-30

## 2023-01-30 ENCOUNTER — OFFICE VISIT (OUTPATIENT)
Dept: OTOLARYNGOLOGY | Facility: CLINIC | Age: 73
End: 2023-01-30
Payer: COMMERCIAL

## 2023-01-30 ENCOUNTER — PATIENT MESSAGE (OUTPATIENT)
Dept: RADIATION ONCOLOGY | Facility: CLINIC | Age: 73
End: 2023-01-30
Payer: COMMERCIAL

## 2023-01-30 ENCOUNTER — LAB VISIT (OUTPATIENT)
Dept: LAB | Facility: HOSPITAL | Age: 73
End: 2023-01-30
Attending: RADIOLOGY
Payer: COMMERCIAL

## 2023-01-30 VITALS — DIASTOLIC BLOOD PRESSURE: 65 MMHG | HEART RATE: 50 BPM | SYSTOLIC BLOOD PRESSURE: 106 MMHG

## 2023-01-30 DIAGNOSIS — H61.23 BILATERAL IMPACTED CERUMEN: ICD-10-CM

## 2023-01-30 DIAGNOSIS — Z78.9 HISTORY OF EXCESSIVE CERUMEN: Primary | ICD-10-CM

## 2023-01-30 DIAGNOSIS — C61 PROSTATE CANCER: ICD-10-CM

## 2023-01-30 LAB — COMPLEXED PSA SERPL-MCNC: 10.2 NG/ML (ref 0–4)

## 2023-01-30 PROCEDURE — 69210 REMOVE IMPACTED EAR WAX UNI: CPT | Mod: S$GLB,,, | Performed by: OTOLARYNGOLOGY

## 2023-01-30 PROCEDURE — 1157F ADVNC CARE PLAN IN RCRD: CPT | Mod: CPTII,S$GLB,, | Performed by: OTOLARYNGOLOGY

## 2023-01-30 PROCEDURE — 69210 PR REMOVAL IMPACTED CERUMEN REQUIRING INSTRUMENTATION, UNILATERAL: ICD-10-PCS | Mod: S$GLB,,, | Performed by: OTOLARYNGOLOGY

## 2023-01-30 PROCEDURE — 99999 PR PBB SHADOW E&M-EST. PATIENT-LVL II: ICD-10-PCS | Mod: PBBFAC,,, | Performed by: OTOLARYNGOLOGY

## 2023-01-30 PROCEDURE — 1101F PT FALLS ASSESS-DOCD LE1/YR: CPT | Mod: CPTII,S$GLB,, | Performed by: OTOLARYNGOLOGY

## 2023-01-30 PROCEDURE — 99499 NO LOS: ICD-10-PCS | Mod: S$GLB,,, | Performed by: OTOLARYNGOLOGY

## 2023-01-30 PROCEDURE — 99499 UNLISTED E&M SERVICE: CPT | Mod: S$GLB,,, | Performed by: OTOLARYNGOLOGY

## 2023-01-30 PROCEDURE — 1157F PR ADVANCE CARE PLAN OR EQUIV PRESENT IN MEDICAL RECORD: ICD-10-PCS | Mod: CPTII,S$GLB,, | Performed by: OTOLARYNGOLOGY

## 2023-01-30 PROCEDURE — 3078F PR MOST RECENT DIASTOLIC BLOOD PRESSURE < 80 MM HG: ICD-10-PCS | Mod: CPTII,S$GLB,, | Performed by: OTOLARYNGOLOGY

## 2023-01-30 PROCEDURE — 3288F FALL RISK ASSESSMENT DOCD: CPT | Mod: CPTII,S$GLB,, | Performed by: OTOLARYNGOLOGY

## 2023-01-30 PROCEDURE — 36415 COLL VENOUS BLD VENIPUNCTURE: CPT | Performed by: RADIOLOGY

## 2023-01-30 PROCEDURE — 3288F PR FALLS RISK ASSESSMENT DOCUMENTED: ICD-10-PCS | Mod: CPTII,S$GLB,, | Performed by: OTOLARYNGOLOGY

## 2023-01-30 PROCEDURE — 3074F SYST BP LT 130 MM HG: CPT | Mod: CPTII,S$GLB,, | Performed by: OTOLARYNGOLOGY

## 2023-01-30 PROCEDURE — 1126F PR PAIN SEVERITY QUANTIFIED, NO PAIN PRESENT: ICD-10-PCS | Mod: CPTII,S$GLB,, | Performed by: OTOLARYNGOLOGY

## 2023-01-30 PROCEDURE — 3074F PR MOST RECENT SYSTOLIC BLOOD PRESSURE < 130 MM HG: ICD-10-PCS | Mod: CPTII,S$GLB,, | Performed by: OTOLARYNGOLOGY

## 2023-01-30 PROCEDURE — 84153 ASSAY OF PSA TOTAL: CPT | Performed by: RADIOLOGY

## 2023-01-30 PROCEDURE — 1126F AMNT PAIN NOTED NONE PRSNT: CPT | Mod: CPTII,S$GLB,, | Performed by: OTOLARYNGOLOGY

## 2023-01-30 PROCEDURE — 99999 PR PBB SHADOW E&M-EST. PATIENT-LVL II: CPT | Mod: PBBFAC,,, | Performed by: OTOLARYNGOLOGY

## 2023-01-30 PROCEDURE — 1101F PR PT FALLS ASSESS DOC 0-1 FALLS W/OUT INJ PAST YR: ICD-10-PCS | Mod: CPTII,S$GLB,, | Performed by: OTOLARYNGOLOGY

## 2023-01-30 PROCEDURE — 3078F DIAST BP <80 MM HG: CPT | Mod: CPTII,S$GLB,, | Performed by: OTOLARYNGOLOGY

## 2023-01-30 RX ORDER — CLONAZEPAM 0.5 MG/1
TABLET ORAL
Qty: 60 TABLET | Refills: 5 | Status: SHIPPED | OUTPATIENT
Start: 2023-01-30 | End: 2023-08-28 | Stop reason: SDUPTHER

## 2023-01-30 RX ORDER — ACETAZOLAMIDE 250 MG/1
250 TABLET ORAL EVERY 12 HOURS
COMMUNITY
Start: 2022-08-26 | End: 2023-02-13

## 2023-01-30 RX ORDER — RABEPRAZOLE SODIUM 20 MG/1
20 TABLET, DELAYED RELEASE ORAL 2 TIMES DAILY
Qty: 180 TABLET | Refills: 3 | Status: SHIPPED | OUTPATIENT
Start: 2023-01-30 | End: 2023-08-28 | Stop reason: SDUPTHER

## 2023-01-30 NOTE — PROGRESS NOTES
CC:Ear cleaning  HPI: Mr. 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  He has a chronic history of chronic tinnitus perception which was never been completely alleviated by any medication, device or use of amplification.  He is interested in repeat audiometry here ( before I retire).  He will be scheduled for ear cleaning in late March this year.     Past Medical History:   Diagnosis Date    Allergy     Arthritis     Back pain     after trauma beginning in 89    Cataract     Chronic pain     neck and left shoulder    Cluster headache 2013    Colon polyps 2007 2007-2019: TA x5, HP x3    Degenerative disc disease     Depression     Diverticulitis 12/2013    Fibromyalgia 2013    GERD (gastroesophageal reflux disease)     Hepatitis 1970's    A    History of prostate biopsy 2002    Hyperlipidemia     Joint pain     Prostate cancer 07/2021    Sleep apnea     Thyroid nodule 7/16/2014    Tubular adenoma of colon 2007    5 removed [id number]    Visual disturbance 2012    problems after cataract surgery       PE:  General: Alert and oriented and articulate gentleman in no acute distress  Both ears were examined under the microscope.  A large volume of semi occlusive cerumen is removed from the floor of each ear canal with a blunt curette.    Both TMs are clear and intact as visualized.  There is no evidence of otitis externa of either canal.  Audiometry was not performed today.      DIAGNOSIS:     ICD-10-CM ICD-9-CM    1. History of excessive cerumen  Z78.9 V49.89       2. Bilateral impacted cerumen  H61.23 380.4         PLAN: Semi-occlusive cerumen impactions removed from both eacs  RTC mid March 2023 for ear cleaning; audiometry recommended/to be performed on return

## 2023-01-30 NOTE — PATIENT INSTRUCTIONS
Semi-occlusive cerumen impactions removed from both eacs  RTC mid March 2023 for ear cleaning; audiometry recommended/to be performed on return

## 2023-01-31 ENCOUNTER — OFFICE VISIT (OUTPATIENT)
Dept: RADIATION ONCOLOGY | Facility: CLINIC | Age: 73
End: 2023-01-31
Payer: COMMERCIAL

## 2023-01-31 DIAGNOSIS — C61 PROSTATE CANCER: Primary | ICD-10-CM

## 2023-01-31 PROCEDURE — 1159F MED LIST DOCD IN RCRD: CPT | Mod: CPTII,95,, | Performed by: RADIOLOGY

## 2023-01-31 PROCEDURE — 1157F ADVNC CARE PLAN IN RCRD: CPT | Mod: CPTII,95,, | Performed by: RADIOLOGY

## 2023-01-31 PROCEDURE — 1160F PR REVIEW ALL MEDS BY PRESCRIBER/CLIN PHARMACIST DOCUMENTED: ICD-10-PCS | Mod: CPTII,95,, | Performed by: RADIOLOGY

## 2023-01-31 PROCEDURE — 1160F RVW MEDS BY RX/DR IN RCRD: CPT | Mod: CPTII,95,, | Performed by: RADIOLOGY

## 2023-01-31 PROCEDURE — 1159F PR MEDICATION LIST DOCUMENTED IN MEDICAL RECORD: ICD-10-PCS | Mod: CPTII,95,, | Performed by: RADIOLOGY

## 2023-01-31 PROCEDURE — 1157F PR ADVANCE CARE PLAN OR EQUIV PRESENT IN MEDICAL RECORD: ICD-10-PCS | Mod: CPTII,95,, | Performed by: RADIOLOGY

## 2023-01-31 PROCEDURE — 99212 PR OFFICE/OUTPT VISIT, EST, LEVL II, 10-19 MIN: ICD-10-PCS | Mod: 95,,, | Performed by: RADIOLOGY

## 2023-01-31 PROCEDURE — 99212 OFFICE O/P EST SF 10 MIN: CPT | Mod: 95,,, | Performed by: RADIOLOGY

## 2023-01-31 NOTE — PROGRESS NOTES
Subjective:       Patient ID: Raffy Rutherford Jr. is a 72 y.o. male.    Chief Complaint: No chief complaint on file.    The patient location is: home  The chief complaint leading to consultation is: prostate cancer     Visit type: audiovisual    Face to Face time wh patient: 15 minutes   20 minutes of total time spent on the encounter, which includes face to face time and non-face to face time preparing to see the patient (eg, review of tests), Obtaining and/or reviewing separately obtained history, Documenting clinical information in the electronic or other health record, Independently interpreting results (not separately reported) and communicating results to the patient/family/caregiver, or Care coordination (not separately reported).     Each patient to whom he or she provides medical services by telemedicine is:  (1) informed of the relationship between the physician and patient and the respective role of any other health care provider with respect to management of the patient; and (2) notified that he or she may decline to receive medical services by telemedicine and may withdraw from such care at any time.    Mr. Rutherford has a history clinical stage IIB (T1c, N0, M0, PSA < 10, GG2) prostate cancer.  He presented for further evaluation after repeat PSA on 5/25/21 returned at 8.2 ng/ml.  MRI on 5/25/21 revealed a 46.7 cc prostate with a 1.3 cm T2 hypointense lesion in the Rt. mid gland, PI-RADS 4.  There was no extraprostatic extension.  The seminal vesicles and neurovascular bundles were unremarkable.  Biopsies on 6/15/21 revealed [place] 7 (3+4) adenocarcinoma involving 29% of 4 of 6 cores from the targeted lesion in the Rt. apex.  The [place] pattern 4 accounted for 10 - 20% of the tumor.  There was a small focus of atypical glands at the Lt. apex but the remaining biopsies revealed benign prostatic tissue.  Polaris recurrence score returned at 3.2 which is at the borderline of active surveillance and single  modality treatment.   Today the patient states he feels well.  No significant complaints.     Review of Systems   Constitutional:  Negative for activity change, appetite change, chills and fatigue.   Gastrointestinal:  Negative for constipation, diarrhea and fecal incontinence.   Genitourinary:  Negative for bladder incontinence, difficulty urinating, dysuria and frequency.       Objective:      Physical Exam  Constitutional:       General: He is not in acute distress.     Appearance: Normal appearance.   Neurological:      Mental Status: He is alert and oriented to person, place, and time.   Psychiatric:         Mood and Affect: Mood normal.         Judgment: Judgment normal.        Latest Reference Range & Units 09/22/21 11:18 01/20/22 11:33 05/03/22 13:54 07/12/22 08:34 10/07/22 09:31 01/30/23 16:51   PSA Diagnostic 0.00 - 4.00 ng/mL 10.2 (H) 8.6 (H) 8.9 (H) 9.4 (H) 10.0 (H) 10.2 (H)   (H): Data is abnormally high  Assessment:       Problem List Items Addressed This Visit       Prostate cancer - Primary         Plan:       Doing well no evidence of tumor progression.  Plan follow up PSA in late April  Will consider repeat MRI in May with possible repeat biopsy depending on results.

## 2023-02-01 ENCOUNTER — PATIENT MESSAGE (OUTPATIENT)
Dept: NEUROLOGY | Facility: CLINIC | Age: 73
End: 2023-02-01
Payer: COMMERCIAL

## 2023-02-01 ENCOUNTER — PATIENT MESSAGE (OUTPATIENT)
Dept: UROLOGY | Facility: CLINIC | Age: 73
End: 2023-02-01
Payer: COMMERCIAL

## 2023-02-01 DIAGNOSIS — G43.719 INTRACTABLE CHRONIC MIGRAINE WITHOUT AURA AND WITHOUT STATUS MIGRAINOSUS: Primary | ICD-10-CM

## 2023-02-02 ENCOUNTER — PATIENT MESSAGE (OUTPATIENT)
Dept: NEUROLOGY | Facility: CLINIC | Age: 73
End: 2023-02-02
Payer: COMMERCIAL

## 2023-02-02 RX ORDER — ATOGEPANT 30 MG/1
30 TABLET ORAL DAILY
Qty: 30 TABLET | Refills: 2 | Status: SHIPPED | OUTPATIENT
Start: 2023-02-02 | End: 2023-05-08

## 2023-02-06 ENCOUNTER — OFFICE VISIT (OUTPATIENT)
Dept: NEUROLOGY | Facility: CLINIC | Age: 73
End: 2023-02-06
Payer: COMMERCIAL

## 2023-02-06 ENCOUNTER — LAB VISIT (OUTPATIENT)
Dept: LAB | Facility: HOSPITAL | Age: 73
End: 2023-02-06
Attending: PSYCHIATRY & NEUROLOGY
Payer: COMMERCIAL

## 2023-02-06 VITALS
WEIGHT: 173.81 LBS | SYSTOLIC BLOOD PRESSURE: 100 MMHG | DIASTOLIC BLOOD PRESSURE: 69 MMHG | HEART RATE: 66 BPM | BODY MASS INDEX: 26.34 KG/M2 | HEIGHT: 68 IN

## 2023-02-06 DIAGNOSIS — G62.9 NEUROPATHY: ICD-10-CM

## 2023-02-06 DIAGNOSIS — G62.9 NEUROPATHY: Primary | ICD-10-CM

## 2023-02-06 PROCEDURE — 1100F PTFALLS ASSESS-DOCD GE2>/YR: CPT | Mod: CPTII,S$GLB,, | Performed by: PSYCHIATRY & NEUROLOGY

## 2023-02-06 PROCEDURE — 1125F AMNT PAIN NOTED PAIN PRSNT: CPT | Mod: CPTII,S$GLB,, | Performed by: PSYCHIATRY & NEUROLOGY

## 2023-02-06 PROCEDURE — 1159F MED LIST DOCD IN RCRD: CPT | Mod: CPTII,S$GLB,, | Performed by: PSYCHIATRY & NEUROLOGY

## 2023-02-06 PROCEDURE — 3008F PR BODY MASS INDEX (BMI) DOCUMENTED: ICD-10-PCS | Mod: CPTII,S$GLB,, | Performed by: PSYCHIATRY & NEUROLOGY

## 2023-02-06 PROCEDURE — 1157F PR ADVANCE CARE PLAN OR EQUIV PRESENT IN MEDICAL RECORD: ICD-10-PCS | Mod: CPTII,S$GLB,, | Performed by: PSYCHIATRY & NEUROLOGY

## 2023-02-06 PROCEDURE — 99999 PR PBB SHADOW E&M-EST. PATIENT-LVL IV: ICD-10-PCS | Mod: PBBFAC,,, | Performed by: PSYCHIATRY & NEUROLOGY

## 2023-02-06 PROCEDURE — 99214 OFFICE O/P EST MOD 30 MIN: CPT | Mod: S$GLB,,, | Performed by: PSYCHIATRY & NEUROLOGY

## 2023-02-06 PROCEDURE — 83520 IMMUNOASSAY QUANT NOS NONAB: CPT | Mod: 59 | Performed by: PSYCHIATRY & NEUROLOGY

## 2023-02-06 PROCEDURE — 36415 COLL VENOUS BLD VENIPUNCTURE: CPT | Performed by: PSYCHIATRY & NEUROLOGY

## 2023-02-06 PROCEDURE — 3078F DIAST BP <80 MM HG: CPT | Mod: CPTII,S$GLB,, | Performed by: PSYCHIATRY & NEUROLOGY

## 2023-02-06 PROCEDURE — 3078F PR MOST RECENT DIASTOLIC BLOOD PRESSURE < 80 MM HG: ICD-10-PCS | Mod: CPTII,S$GLB,, | Performed by: PSYCHIATRY & NEUROLOGY

## 2023-02-06 PROCEDURE — 99999 PR PBB SHADOW E&M-EST. PATIENT-LVL IV: CPT | Mod: PBBFAC,,, | Performed by: PSYCHIATRY & NEUROLOGY

## 2023-02-06 PROCEDURE — 1100F PR PT FALLS ASSESS DOC 2+ FALLS/FALL W/INJURY/YR: ICD-10-PCS | Mod: CPTII,S$GLB,, | Performed by: PSYCHIATRY & NEUROLOGY

## 2023-02-06 PROCEDURE — 3008F BODY MASS INDEX DOCD: CPT | Mod: CPTII,S$GLB,, | Performed by: PSYCHIATRY & NEUROLOGY

## 2023-02-06 PROCEDURE — 99214 PR OFFICE/OUTPT VISIT, EST, LEVL IV, 30-39 MIN: ICD-10-PCS | Mod: S$GLB,,, | Performed by: PSYCHIATRY & NEUROLOGY

## 2023-02-06 PROCEDURE — 3288F PR FALLS RISK ASSESSMENT DOCUMENTED: ICD-10-PCS | Mod: CPTII,S$GLB,, | Performed by: PSYCHIATRY & NEUROLOGY

## 2023-02-06 PROCEDURE — 1157F ADVNC CARE PLAN IN RCRD: CPT | Mod: CPTII,S$GLB,, | Performed by: PSYCHIATRY & NEUROLOGY

## 2023-02-06 PROCEDURE — 86255 FLUORESCENT ANTIBODY SCREEN: CPT | Mod: 59 | Performed by: PSYCHIATRY & NEUROLOGY

## 2023-02-06 PROCEDURE — 1159F PR MEDICATION LIST DOCUMENTED IN MEDICAL RECORD: ICD-10-PCS | Mod: CPTII,S$GLB,, | Performed by: PSYCHIATRY & NEUROLOGY

## 2023-02-06 PROCEDURE — 3074F SYST BP LT 130 MM HG: CPT | Mod: CPTII,S$GLB,, | Performed by: PSYCHIATRY & NEUROLOGY

## 2023-02-06 PROCEDURE — 3074F PR MOST RECENT SYSTOLIC BLOOD PRESSURE < 130 MM HG: ICD-10-PCS | Mod: CPTII,S$GLB,, | Performed by: PSYCHIATRY & NEUROLOGY

## 2023-02-06 PROCEDURE — 1125F PR PAIN SEVERITY QUANTIFIED, PAIN PRESENT: ICD-10-PCS | Mod: CPTII,S$GLB,, | Performed by: PSYCHIATRY & NEUROLOGY

## 2023-02-06 PROCEDURE — 3288F FALL RISK ASSESSMENT DOCD: CPT | Mod: CPTII,S$GLB,, | Performed by: PSYCHIATRY & NEUROLOGY

## 2023-02-07 NOTE — PROGRESS NOTES
Raffy Rutherford Jr. is a 72 y.o. year old male that  presents for neuropathy follow up.     HPI:  I had the pleasure of seeing Mr Rutherford for his neurology follow up today.  He is well known to this office, previously seen by our headache clinic and neuromuscular specialists. I reviewed the available neurological notes.  Mr Rutherford has HLD, migraine, fibromyalgia, s/p bilateral CTS surgery, chronic low back pain due to degenerative disc disease s/p fusion,  prostate cancer, depression, GERD, WINNIE, and severe motor ? hereditary neuropathy.  Endorses progressive worsening of his underlying neuropathy over the past couple of years. In this regard, he reports increasing numbness-tingling, burning pain, weakness and muscle wasting of his legs, as well as imbalance that already caused couple of falls this year. Additionally, he tells me that his bilateral foot droop appears to be more severe lately and has to use his AFO more frequently.  Said that Lyrica helps a little bit and despite the fact that the tingling and burning sensation in his feet and legs is worse during the evening, at least he can sleep well.  Complains of frequent HA (follow by the HA clinic), low back pain, but denies vertigo, double vision, difficulty swallowing, slurred speech, language and vision impairment.  He is also follow by the pain clinic regarding chronic low back pain.        Past Medical History:   Diagnosis Date    Allergy     Arthritis     Back pain     after trauma beginning in 89    Cataract     Chronic pain     neck and left shoulder    Cluster headache 2013    Colon polyps 2007 2007-2019: TA x5, HP x3    Degenerative disc disease     Depression     Diverticulitis 12/2013    Fibromyalgia 2013    GERD (gastroesophageal reflux disease)     Hepatitis 1970's    A    History of prostate biopsy 2002    Hyperlipidemia     Joint pain     Prostate cancer 07/2021    Sleep apnea     Thyroid nodule 7/16/2014    Tubular adenoma of colon 2007    5  removed [id number]    Visual disturbance 2012    problems after cataract surgery     Social History     Socioeconomic History    Marital status:    Occupational History    Occupation: Psychotherpist    Tobacco Use    Smoking status: Never    Smokeless tobacco: Never   Substance and Sexual Activity    Alcohol use: Yes     Comment: occasion    Drug use: No    Sexual activity: Yes     Partners: Female     Past Surgical History:   Procedure Laterality Date    BACK SURGERY      CARPAL TUNNEL RELEASE Right 5/18/2021    Procedure: RELEASE, CARPAL TUNNEL;  Surgeon: Kaye Solis MD;  Location: [facility];  Service: Orthopedics;  Laterality: Right;    CATARACT EXTRACTION W/  INTRAOCULAR LENS IMPLANT Bilateral     CHOLECYSTECTOMY      COLONOSCOPY N/A 12/17/2019    Normal - Repeat 5yrs    COLONOSCOPY  2007 2007 TA x2, 2011 TA x3, 2014 HP x3, 2019 normal    COSMETIC SURGERY  2/10/2015    Direct mid-forehead brow lift    COSMETIC SURGERY  2/10/2015    Bilateral upper lid blepahroplasty    CYSTOSCOPY WITH URETEROSCOPY, RETROGRADE PYELOGRAPHY, AND INSERTION OF STENT Left 8/19/2020    Procedure: CYSTOSCOPY, WITH RETROGRADE PYELOGRAM AND URETERAL STENT INSERTION;  Surgeon: Katelynn George MD;  Location: [facility];  Service: Urology;  Laterality: Left;    ESOPHAGOGASTRODUODENOSCOPY N/A 12/17/2019    Procedure: ESOPHAGOGASTRODUODENOSCOPY (EGD);  Surgeon: Amadou Hardin MD;  Location: [address]);  Service: Endoscopy;  Laterality: N/A;    EYE SURGERY      FUSION OF LUMBAR SPINE BY ANTERIOR APPROACH N/A 8/20/2020    Procedure: FUSION, SPINE, LUMBAR, ANTERIOR APPROACH L5-S1 ALIF Stand Alone;  Surgeon: Jason Caldwell MD;  Location: [facility];  Service: Neurosurgery;  Laterality: N/A;    HEMORRHOID SURGERY      with complication of chronic bleeding for 6 weeks     INJECTION OF STEROID Right 12/6/2018    Procedure: INJECTION, STEROID Right SI Joint Block and Steroid Injection;  Surgeon: Jason Caldwell MD;  Location:  [facility] OR;  Service: Neurosurgery;  Laterality: Right;  Procedure: Right SI Joint Block and Steroid Injection  Surgery Time: 30 MIN  LOS: 0  Anesthesia: MAC  Radiology: C-arm  Bed: Favian 4 Poster  Position: Prone    INJECTION OF STEROID Right 9/19/2019    Procedure: INJECTION, STEROID Procedure: Right SI joint block nd steroid injection;  Surgeon: Jason Caldwell MD;  Location: [facility] OR;  Service: Neurosurgery;  Laterality: Right;  Procedure: Right SI joint block nd steroid injection  Surgery Time: 30 mins  LOS:   Anesthesia: General MAC  Radiology:C-arm  Bed: Regular Bed  Position: Prone    IRRIGATION AND DEBRIDEMENT OF UPPER EXTREMITY Left 4/7/2022    Procedure: IRRIGATION AND DEBRIDEMENT, UPPER EXTREMITY;  Surgeon: Kaye Solis MD;  Location: [facility] OR;  Service: Orthopedics;  Laterality: Left;    JOINT REPLACEMENT      KNEE SURGERY      involving arthroscopic surgery to both knees    REPAIR OF EXTENSOR TENDON Left 4/7/2022    Procedure: REPAIR, TENDON, EXTENSOR thumb, EPB and EPL;  Surgeon: Kaye Solis MD;  Location: [facility] OR;  Service: Orthopedics;  Laterality: Left;    SINUS SURGERY      left molar and sinus surgery for trigeminal neuralgia    SPINAL FUSION  06/22/2015    L3-L5 XLIF/TANA    TOTAL HIP ARTHROPLASTY  4/2012    Pt states he had total hip replacement on his left hip.    ULNAR NERVE TRANSPOSITION Left 12/16/2020    Procedure: TRANSPOSITION, NERVE, ULNAR - left carpal and cubital tunnel releases;  Surgeon: Addi Bauer MD;  Location: [facility] OR;  Service: Orthopedics;  Laterality: Left;     Family History   Problem Relation Age of Onset    Stroke Mother 86    Colon cancer Mother         early/mid-60s    Uterine cancer Mother 77    Pancreatitis Brother         acute, now s/p nathalia    Diabetes Brother     Macular degeneration Brother     Other Brother         foot drop    Other Father 44        pituitary tumor- CA vs benign?    Heart attack Maternal Grandfather         suspected, 50s     "Migraines Sister     Crohn's disease Sister         w/ assoc joint issues    Arthritis Sister     Tremor Daughter     Irritable bowel syndrome Son         leaning toward Crohn's dx    Neurological Disorders Son         nonspecific, benign fasciculations of calves    Tremor Son     Jaundice Grandchild     Other Maternal Uncle         memory issue    Other Maternal Uncle         memory issue    Cancer Maternal Cousin         origin? maybe thyroid? 40s?    Melanoma Neg Hx     Amblyopia Neg Hx     Blindness Neg Hx     Cataracts Neg Hx     Glaucoma Neg Hx     Hypertension Neg Hx     Retinal detachment Neg Hx     Strabismus Neg Hx     Thyroid disease Neg Hx     Allergic rhinitis Neg Hx     Allergies Neg Hx     Angioedema Neg Hx     Asthma Neg Hx     Eczema Neg Hx     Urticaria Neg Hx     Rhinitis Neg Hx     Immunodeficiency Neg Hx     Atopy Neg Hx            Review of Systems  General ROS: negative for chills, fever or weight loss  Psychological ROS: negative for hallucination, depression or suicidal ideation  Ophthalmic ROS: negative for blurry vision, photophobia or eye pain  ENT ROS: negative for epistaxis, sore throat or rhinorrhea  Respiratory ROS: no cough, shortness of breath, or wheezing  Cardiovascular ROS: no chest pain or dyspnea on exertion  Gastrointestinal ROS: no abdominal pain, change in bowel habits, or black/ bloody stools  Genito-Urinary ROS: no dysuria, trouble voiding, or hematuria  Musculoskeletal ROS: positive for gait disturbance and muscular weakness  Neurological ROS: no syncope or seizures; ataxia with steppage gait  Dermatological ROS: negative for pruritis, rash and jaundice      Physical Exam:  /69   Pulse 66   Ht 5' 8" (1.727 m)   Wt 78.8 kg (173 lb 13.3 oz)   BMI 26.43 kg/m²   General appearance: alert, cooperative, no distress  Constitutional:Oriented to person, place, and time.appears well-developed and well-nourished.   HEENT: Normocephalic, atraumatic, neck symmetrical, no " nasal discharge   Eyes: conjunctivae/corneas clear, PERRL, EOM's intact  Lungs: clear to auscultation bilaterally, no dullness to percussion bilaterally  Heart: regular rate and rhythm without rub; no displacement of the PMI   Abdomen: soft, non-tender; bowel sounds normoactive; no organomegaly  Extremities: wears AFO, bilateral legs atrophy; no clubbing, cyanosis, or edema  Integument: Skin color, texture, turgor normal; no rashes; hair distrubution normal  Neurologic:   Mental status: alert and awake, oriented x 4, comprehension, naming, and repetition intact. No right to left confusion. Performs serial 7's without difficulty .No dysarthria.  CN 2-12: pupils 4 mm bilaterally, reactive to light. Fundi without papilledema. Visual fields full to confrontation. EOM full without nystagmus. Face sensation normal in all distributions. Face symmetric. Hearing grossly intact. Palate elevates well. Tongue midline without atrophy or fasciculations.  Motor: 5/5 all over EXCEPT severe R greater than L bilateral weakness foot dorsiflexors and foot evertors  Sensory: intact in all modalities.  Muscle bulk: atrophy calf muscles bilaterally  DTR's: 2+ all over EXCEPT absent ankle jerks.  Plantars: no tested.  Coordination: finger to nose normal, postural tremor noted involving bilateral hands- low-amplitude high-frequency tremor, heel-knee-shin no tested  Gait: steppage gait, unable to walk on heels or toes.  Able to tandem with but with difficulty    LABS:    Complete Blood Count  Lab Results   Component Value Date    RBC 4.98 03/25/2022    HGB 15.2 03/25/2022    HCT 47.5 03/25/2022    MCV 95 03/25/2022    MCH 30.5 03/25/2022    MCHC 32.0 03/25/2022    RDW 12.6 03/25/2022     03/25/2022    MPV 9.9 03/25/2022    GRAN 3.2 03/25/2022    GRAN 53.2 03/25/2022    LYMPH 2.2 03/25/2022    LYMPH 37.6 03/25/2022    MONO 0.4 03/25/2022    MONO 6.7 03/25/2022    EOS 0.1 03/25/2022    BASO 0.03 03/25/2022    EOSINOPHIL 1.8 03/25/2022     BASOPHIL 0.5 03/25/2022    DIFFMETHOD Automated 03/25/2022       Comprehensive Metabolic Panel  Lab Results   Component Value Date    GLU 99 01/10/2023    BUN 15 01/10/2023    CREATININE 0.8 01/10/2023     01/10/2023    K 4.5 01/10/2023     01/10/2023    PROT 7.5 01/10/2023    ALBUMIN 4.3 01/10/2023    BILITOT 0.7 01/10/2023    AST 22 01/10/2023    ALKPHOS 37 (L) 01/10/2023    CO2 30 (H) 01/10/2023    ALT 22 01/10/2023    ANIONGAP 7 (L) 01/10/2023    EGFRNONAA >60.0 03/25/2022    ESTGFRAFRICA >60.0 03/25/2022       TSH  Lab Results   Component Value Date    TSH 1.025 07/12/2022         Assessment: 71 y/o with HLD, migraine, fibromyalgia, s/p bilateral CTS surgery, chronic low back pain due to degenerative disc disease s/p fusion,  prostate cancer, depression, GERD, WINNIE, and 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  Neuro exam as detailed above.  I had a long conversation with Mr Rutherford regarding his underlying progressive neuropathy. As per review of his medical record, it is not quite clear if he has an underlying hereditary motor axonal neuropathy.  I am going to obtain additional serologies but won't make changes today to his symptomatic neuropathy regimen.              ICD-10-CM ICD-9-CM    1. Neuropathy  G62.9 355.9 Paraneoplastic Autoantibody Evaulation, Serum      SENSORY NEUROPATHY PROFILE      Ambulatory referral/consult to Physical/Occupational Therapy        The encounter diagnosis was Neuropathy.      Plan:  1) Progressive neuropathy: as above  2) Chronic low back pain due to degenerative disc disease s/p fusion  3) HLD  4) Migraine  5) Fibromyalgia  6) S/p bilateral CTS surgery  7) Prostate cancer  8) Depression  9) GERD  10) WINNIE          Orders Placed This Encounter   Procedures    Paraneoplastic Autoantibody Evaulation, Serum    SENSORY NEUROPATHY PROFILE    Ambulatory referral/consult to Physical/Occupational Therapy           Kashmir Davidson MD

## 2023-02-13 ENCOUNTER — PROCEDURE VISIT (OUTPATIENT)
Dept: NEUROLOGY | Facility: CLINIC | Age: 73
End: 2023-02-13
Payer: COMMERCIAL

## 2023-02-13 VITALS
DIASTOLIC BLOOD PRESSURE: 78 MMHG | BODY MASS INDEX: 27.2 KG/M2 | HEART RATE: 74 BPM | SYSTOLIC BLOOD PRESSURE: 120 MMHG | WEIGHT: 178.88 LBS

## 2023-02-13 DIAGNOSIS — G43.909 MIGRAINE WITHOUT STATUS MIGRAINOSUS, NOT INTRACTABLE, UNSPECIFIED MIGRAINE TYPE: ICD-10-CM

## 2023-02-13 DIAGNOSIS — G89.29 OTHER CHRONIC PAIN: ICD-10-CM

## 2023-02-13 DIAGNOSIS — G43.709 CHRONIC MIGRAINE WITHOUT AURA WITHOUT STATUS MIGRAINOSUS, NOT INTRACTABLE: Primary | ICD-10-CM

## 2023-02-13 DIAGNOSIS — F33.9 EPISODE OF RECURRENT MAJOR DEPRESSIVE DISORDER, UNSPECIFIED DEPRESSION EPISODE SEVERITY: ICD-10-CM

## 2023-02-13 DIAGNOSIS — F41.9 ANXIETY: ICD-10-CM

## 2023-02-13 PROCEDURE — 64615 PR CHEMODENERVATION OF MUSCLE FOR CHRONIC MIGRAINE: ICD-10-PCS | Mod: S$GLB,,, | Performed by: NURSE PRACTITIONER

## 2023-02-13 PROCEDURE — 64615 CHEMODENERV MUSC MIGRAINE: CPT | Mod: S$GLB,,, | Performed by: NURSE PRACTITIONER

## 2023-02-13 PROCEDURE — 99499 NO LOS: ICD-10-PCS | Mod: S$GLB,,, | Performed by: NURSE PRACTITIONER

## 2023-02-13 PROCEDURE — 99499 UNLISTED E&M SERVICE: CPT | Mod: S$GLB,,, | Performed by: NURSE PRACTITIONER

## 2023-02-13 RX ORDER — ATOGEPANT 60 MG/1
60 TABLET ORAL DAILY
Qty: 30 TABLET | Refills: 5 | Status: SHIPPED | OUTPATIENT
Start: 2023-02-13 | End: 2023-05-08

## 2023-02-13 NOTE — PROCEDURES
SUBJECTIVE:  Patient ID: Raffy Rutherford Jr.  Chief Complaint: Botulinum Toxin Injection and Follow-up    History of Present Illness:  Raffy Rutherford Jr. is a 72 y.o. male who presents to clinic alone for follow-up of headaches and Botox injections.     Recommendations made at last Office Visit on 1/9/23:  - Discussed symptoms appear to be consistent with chronic migraine complicated by chronic pain, anxiety/depression, insomnia, discussed treatment options and patient agreed with the following plan:  - Seek approval for Botox for chronic migraine   - Recommended he start Ajovy 225 mg SQ monthly as previously prescribed by my colleague DESTINY Norman   - Limit use of fioricet to no more than 6-8 days per month to avoid rebound/medication overuse headache   - Continue lamotrigine 200 mg and trazodone 50 mg daily as prescribed by PCP for depression/anxiety, likely with dual benefit on headaches   - Chronic LBP - continue lyrica as directed by outside pain management Dr. Olivarez   - Could consider functional restoration program in the future   - RTC in 1 month to start Botox      02/13/2023- Interval History:  Headaches continue to occur on a near daily basis with full blown migraines on 12-15 days per month.  He started Ajovy and recently injected third injection, unsure whether or not ajovy was helping.  Seen by outside pain management provider who recommended and prescribed qulipta 10 mg daily, dose increased to 30 mg daily.  For a few days after increasing dose to 30 mg, began to experience issues with insomnia, unsure if this was related to qulipta vs ajovy or combination, but he does feel migraines were better after increasing dose to 30 mg.    Risks, Benefits, and potential side effects of Botox discussed with patient.  Alternative treatments offered.  After thorough discussed regarding the procedure, patient has decided to move forward with initiating Botox injections for Chronic Migraine.  Patient understands we will  complete 3 rounds of injections, if after 3 rounds, we do not see a 50% reduction in headaches, we will discontinue Botox injections.     Otherwise, information below is still accurate and current.     History of Present Illness:   72 y.o. male with headaches, chronic LBP, cervical radiculopathy, anxiety, depression, WINNIE, hx of prostate cancer, who presents for evaluation of headaches via virtual visit.  Patient is established with neurology clinic, previous patient of DESTINY Norman and Dr. Millan, he is a new patient to me.    Currently suffering with headaches at least 1-2 times per week each of which last 2-4 days in duration.  Long history of headahces, beginning around 20 yo, had multiple sinus surgeries thinking headaches were sinus related, no improvement.       Headaches are described as a moderate to severe, stabbing/searing pain beginning left retro-orbitalis, extending towards the occipitalis and into his shoulder/left arm/hand.  Headaches can last anywhere from an hour when successfully treated with fioricet or excedrin, but can last up to 4 days in duration, on average headaches are lasting about a day in duration.  Headaches are often present upon waking.   Associated symptoms include fatigue, crepitus in left shoulder, congested (on left side), more trouble concentrating than norm, photophobia, phonophobia.  Currently experiencing some sort of headache on 20-25/30 days, with migraines occurring on 12-15/30 days.    At last visit with DESTINY Norman, she restarted nurtec 75 mg odt and referred him to me for consideration of Botox. Nurtec every other day was ineffective (though was effective in the past for him).  He was then switched to ajovy 225 mg SQ monthly, was waiting for today's visit to decide whether or not to start using ajovy.   Takes lyrica as prescribed by outside pain management, for chronic back pain.   He is prescribed lamotrigine 200 mg daily for anxiety/depression.  Takes trazodone 50 mg nightly for  insomnia.      Notes from DESTINY Norman:  HPI:   Mr. Raffy Rutherford Jr. is a 72 y.o. male presenting for evaluation regarding chronic low back pain. On chart review he was previously seen by Dr. Millan for both bilateral neuropathy and headache pain. He received an EMG with Dr. Millan on 03/15/2022 which showed evidence of L5/S1 radiculopahties, worse on the right; and moderate chronic reinnervation changes noted in the R vastus medialis. His current therapy regimen is Lyrica 200mg QHS. He notes he was previously seen by neurosurgery and instructed to receive an MRI lumbar spine by Dr. Caldwell but was unsure why this was ordered. We discussed that this is likely due to the evidence on the EMG indicating his bilateral foot drop may be due to a lumbar spine issue at L5/S1 level.      Headache History:   Onset- Teenager   Frequency- 5-6 per week  Duration- All day   Location- L eye and radiates to his L jaw and then toward his L shoulder and arm  Associated symptoms- Nasal blockage on the L side, + photophobia,   Denies any aura, denies nausea/vomiting   Alleviating Factors- Half Fioricet can occasionally relieve his headaches  Aggravating Factors- Smells trigger his headaches and stress/ poor sleep, weather      Of note- patient went to [place] and found in the 3 weeks he was there he only had 2-3 headaches which were easily relieved     He states that he is also concerned regarding his headaches for which he is currently taking Ubrelvy QOD and Fioricet for abortive therapy.       Headache:  Type: iron hot stab like pain   Severity: 9/10  Location: left eye, forehead , cheek into left shoulder and into the back   Triggers: Particular scents, looking at screen for a long time   Frequency: worse in the last week, but otherwise 8-9/month   Duration:  Days if he does not take medication (2-3 days) can have a cycles of headaches for several weeks , sometimes can go 4 weeks without headache   Aura: none   Photophobia: not as much but  does endorse turning the lights off or worsening of headache when he looks at his phone  Phonophobia: ++  Nausea/ vomiting: rarely gets nauseous   Aggravating factors: none   Relieving factors: Medications  Inhales an oil- mydab which has given him significant benefit  Fioricet- none   Ubrelvy- worked okay but Nurtec works better  Voltaren- he takes this for joint pains.   Excedrin- none   Aspirin makes tinnitus worse   Norco- 3 times a day - 1/2 pill Norco in am , 1/2 pill in the afternoon 1/2 + 3/4th pill in the evening. Pain worsens when he tries to taper off his pain medication  He snores at night, he has sleep apnea, he was on CPAP but not very compliant because he states he took the mask off in his sleep. He has tried different masks with no benefit.      Treatments Tried and Response  Nurtec  lyrica  Atogepant  Baclofen   Fioricet  Celebrex  Emgality 09/08/2020 failed  Aimovig  Fioricet   Diamox   Cymbalta   Klonopin   Valium   [place]  Lamictal   Gabapentin   Indometacin   Dilaudid  Verapamil  Nurtec   Ubrelvy   Trazodone  Ajovy   Botox - started today   Qulipta 60 mg - given today     Current Medications:    atogepant (QULIPTA) 30 mg Tab, Take 1 tablet (30 mg) by mouth Daily., Disp: 30 tablet, Rfl: 2    atorvastatin (LIPITOR) 40 MG tablet, Take 1 tablet (40 mg total) by mouth once daily., Disp: 90 tablet, Rfl: 3    butalbital-acetaminophen-caffeine -40 mg (FIORICET, ESGIC) -40 mg per tablet, Take 1 tablet every day by oral route as needed for 30 days., Disp: 30 tablet, Rfl: 0    celecoxib (CELEBREX) 100 MG capsule, Take 100 mg by mouth., Disp: , Rfl:     clonazePAM (KLONOPIN) 0.5 MG tablet, Take 1 tablet by mouth twice a day as needed for anxiety, Disp: 60 tablet, Rfl: 5    COVID-19 antigen test Kit, Flowflex COVID-19 Antigen Home Test kit, Disp: , Rfl:     ergocalciferol (VITAMIN D2) 50,000 unit Cap, Take 1 capsule (50,000 Units total) by mouth every 7 days., Disp: 12 capsule, Rfl: 3     HYDROcodone-acetaminophen (NORCO) 5-325 mg per tablet, Take 1 tablet 4 times a day by oral route as needed for 30 days., Disp: 120 tablet, Rfl: 0    hydrocortisone (ANUSOL-HC) 25 mg suppository, Place 1 suppository (25 mg total) rectally 2 (two) times daily., Disp: 40 suppository, Rfl: 1    lamoTRIgine (LAMICTAL) 200 MG tablet, Take 1 tablet (200 mg total) by mouth once daily., Disp: 90 tablet, Rfl: 3    pregabalin (LYRICA) 25 MG capsule, Take 2 capsules at night. Take 1 capsule in the evening., Disp: 90 capsule, Rfl: 0    pregabalin (LYRICA) 75 MG capsule, Take 2 capsules by mouth at night. 30 day supply., Disp: 60 capsule, Rfl: 0    RABEprazole (ACIPHEX) 20 mg tablet, Take 1 tablet (20 mg total) by mouth 2 (two) times daily., Disp: 180 tablet, Rfl: 3    selegiline (EMSAM) 9 mg/24 hr, Place 1 patch onto the skin once daily., Disp: 30 patch, Rfl: 11    tadalafiL (CIALIS) 5 MG tablet, Take 1 tablet (5 mg total) by mouth daily as needed for Erectile Dysfunction., Disp: 120 tablet, Rfl: 3    terazosin (HYTRIN) 5 MG capsule, Take 1 capsule (5 mg total) by mouth every evening., Disp: 90 capsule, Rfl: 3    traZODone (DESYREL) 50 MG tablet, Take 1 tablet (50 mg total) by mouth every evening., Disp: 90 tablet, Rfl: 3    atogepant (QULIPTA) 60 mg Tab, Take 1 tablet by mouth once daily., Disp: 30 tablet, Rfl: 5    Current Facility-Administered Medications:     onabotulinumtoxina injection 200 Units, 200 Units, Intramuscular, q12 weeks, MIKAYLA Richards, 200 Units at 02/13/23 1349    Review of Systems - A review of 10+ systems was conducted with pertinent positive and negative findings documented in HPI with all other systems reviewed and negative.    PFSH: Past medical, family, and social history reviewed as documented in chart with pertinent positive medical, family, and social history detailed in HPI.    OBJECTIVE:  Vitals:  There were no vitals taken for this visit.    Physical Exam:  Constitutional: he appears  well-developed and well-nourished. he is well groomed. NAD     Review of Data:   Notes from Dr. Davidson reviewed.   Labs:  Lab Visit on 01/30/2023   Component Date Value Ref Range Status    PSA Diagnostic 01/30/2023 10.2 (H)  0.00 - 4.00 ng/mL Final   Lab Visit on 01/10/2023   Component Date Value Ref Range Status    Cholesterol 01/10/2023 189  120 - 199 mg/dL Final    Triglycerides 01/10/2023 118  30 - 150 mg/dL Final    HDL 01/10/2023 62  40 - 75 mg/dL Final    LDL Cholesterol 01/10/2023 103.4  63.0 - 159.0 mg/dL Final    HDL/Cholesterol Ratio 01/10/2023 32.8  20.0 - 50.0 % Final    Total Cholesterol/HDL Ratio 01/10/2023 3.0  2.0 - 5.0 Final    Non-HDL Cholesterol 01/10/2023 127  mg/dL Final    Sodium 01/10/2023 140  136 - 145 mmol/L Final    Potassium 01/10/2023 4.5  3.5 - 5.1 mmol/L Final    Chloride 01/10/2023 103  95 - 110 mmol/L Final    CO2 01/10/2023 30 (H)  23 - 29 mmol/L Final    Glucose 01/10/2023 99  70 - 110 mg/dL Final    BUN 01/10/2023 15  8 - 23 mg/dL Final    Creatinine 01/10/2023 0.8  0.5 - 1.4 mg/dL Final    Calcium 01/10/2023 9.8  8.7 - 10.5 mg/dL Final    Total Protein 01/10/2023 7.5  6.0 - 8.4 g/dL Final    Albumin 01/10/2023 4.3  3.5 - 5.2 g/dL Final    Total Bilirubin 01/10/2023 0.7  0.1 - 1.0 mg/dL Final    Alkaline Phosphatase 01/10/2023 37 (L)  55 - 135 U/L Final    AST 01/10/2023 22  10 - 40 U/L Final    ALT 01/10/2023 22  10 - 44 U/L Final    Anion Gap 01/10/2023 7 (L)  8 - 16 mmol/L Final    eGFR 01/10/2023 >60.0  >60 mL/min/1.73 m^2 Final   Office Visit on 12/14/2022   Component Date Value Ref Range Status    POC Residual Urine Volume 12/14/2022 6  0 - 100 mL Final    Color, UA 12/14/2022 Yellow   Final    pH, UA 12/14/2022 8   Final    WBC, UA 12/14/2022 neg   Final    Nitrite, UA 12/14/2022 neg   Final    Protein, POC 12/14/2022 neg   Final    Glucose, UA 12/14/2022 neg   Final    Ketones, UA 12/14/2022 neg   Final    Urobilinogen, UA 12/14/2022 neg   Final    Bilirubin, POC  12/14/2022 neg   Final    Blood, UA 12/14/2022 neg   Final    Clarity, UA 12/14/2022 Clear   Final   Lab Visit on 10/07/2022   Component Date Value Ref Range Status    PSA Diagnostic 10/07/2022 10.0 (H)  0.00 - 4.00 ng/mL Final      Latest Reference Range & Units 03/25/22 09:13   WBC 3.90 - 12.70 K/uL 5.95   RBC 4.60 - 6.20 M/uL 4.98   Hemoglobin 14.0 - 18.0 g/dL 15.2   Hematocrit 40.0 - 54.0 % 47.5   MCV 82 - 98 fL 95   MCH 27.0 - 31.0 pg 30.5   MCHC 32.0 - 36.0 g/dL 32.0   RDW 11.5 - 14.5 % 12.6   Platelets 150 - 450 K/uL 270   MPV 9.2 - 12.9 fL 9.9   Gran % 38.0 - 73.0 % 53.2   Lymph % 18.0 - 48.0 % 37.6   Mono % 4.0 - 15.0 % 6.7   Eosinophil % 0.0 - 8.0 % 1.8   Basophil % 0.0 - 1.9 % 0.5   Immature Granulocytes 0.0 - 0.5 % 0.2   Gran # (ANC) 1.8 - 7.7 K/uL 3.2   Lymph # 1.0 - 4.8 K/uL 2.2   Mono # 0.3 - 1.0 K/uL 0.4   Eos # 0.0 - 0.5 K/uL 0.1   Baso # 0.00 - 0.20 K/uL 0.03   Immature Grans (Abs) 0.00 - 0.04 K/uL 0.01   nRBC 0 /100 WBC 0   Differential Method  Automated     Imaging:  No results found. However, due to the size of the patient record, not all encounters were searched. Please check Results Review for a complete set of results.    Note: I have independently reviewed any/all imaging/labs/tests and agree with the report (s) as documented.  Any discrepancies will be as noted/demarcated by free text.  GARO BERGER 2/13/2023    ASSESSMENT:  1. Chronic migraine without aura without status migrainosus, not intractable    2. Migraine without status migrainosus, not intractable, unspecified migraine type    3. Other chronic pain    4. Anxiety    5. Episode of recurrent major depressive disorder, unspecified depression episode severity      PLAN:  - Botox administered in clinic for Chronic Migraine (see below)   - D/C Ajovy   - Increase qulipta to 60 mg daily   - Limit use of fioricet to no more than 6-8 days per month to avoid rebound/medication overuse headache   - Continue lamotrigine 200 mg and trazodone 50  mg daily as prescribed by PCP for depression/anxiety, likely with dual benefit on headaches   - Chronic LBP - continue lyrica as directed by outside pain management Dr. Olivarez, on chronic narcotics which are likely contributing to the refractive nature of his migraines/headaches   - Could consider functional restoration program in the future   - RTC in 12 weeks for repeat Botox injections or sooner if needed          All questions and concerns addressed.  Patient verbalizes understanding and is agreeable with the above stated treatment plan.      PROCEDURE NOTE:  BOTOX was performed as an indicated therapy for intractable chronic migraine headaches given that the patient failed more than 2 headache medications    A time out was conducted just before the start of the procedure to verify the correct patient and procedure, procedure location, and all relevant critical information.     Botulinum Toxin Injection Procedure     PROCEDURE PERFORMED: Botulinum toxin injection (13197)  CLINICAL INDICATION: G43.719  After risks and benefits were explained including bleeding, infection, worsening of pain, damage to the areas being injected, weakness of muscles, loss of muscle control, dysphagia if injecting the head or neck, facial droop if injecting the facial area, painful injection, allergic or other reaction to the medications being injected, and the failure of the procedure to help the problem, a signed consent was obtained.   The patient was placed in a comfortable area and the sites to be treated were identified.The area to be treated was prepped three times with alcohol and the alcohol allowed to dry. Next, a 30 gauge needle was used to inject the medication in the area to be treated.      Total Botox used: 155 Units   Unavoidable waste: 45 Units     Injection sites:    muscle bilaterally ( a total of 10 units divided into 2 sites)   Procerus muscle (5 units)   Frontalis muscle bilaterally (a total of 20  units divided into 4 sites)   Temporalis muscle bilaterally (a total of 40 units divided into 8 sites)   Occipitalis muscle bilaterally (a total of 30 units divided into 6 sites)   Cervical paraspinal muscles (a total of 20 units divided into 4 sites)   Trapezius muscle bilaterally (a total of 30 units divided into 6 sites)   Complications: none   RTC for the next Botox injection: 12 weeks     CC: MD Macie Edmondson FNP-JESSICA  [facility] Department of Neurology   [phone]

## 2023-02-17 ENCOUNTER — PATIENT MESSAGE (OUTPATIENT)
Dept: NEUROLOGY | Facility: CLINIC | Age: 73
End: 2023-02-17
Payer: COMMERCIAL

## 2023-02-17 DIAGNOSIS — G62.9 NEUROPATHY: Primary | ICD-10-CM

## 2023-02-17 LAB
AMPHIPHYSIN AB TITR SER: NEGATIVE {TITER}
CV2 IGG TITR SER: NEGATIVE {TITER}
GLIAL NUC TYPE 1 AB TITR SER: NEGATIVE {TITER}
HU1 AB TITR SER: NEGATIVE {TITER}
HU2 AB TITR SER IF: NEGATIVE {TITER}
HU3 AB TITR SER: NEGATIVE {TITER}
IMMUNOLOGIST REVIEW: ABNORMAL
PCA-1 AB TITR SER: NEGATIVE {TITER}
PCA-TR AB TITR SER: NEGATIVE {TITER}
PURKINJE CELL CYTOPLASMIC AB TYPE2: NEGATIVE
VGCC-P/Q BIND AB SER-SCNC: 0.04 NMOL/L
VGKC AB SER-SCNC: 0 NMOL/L

## 2023-02-22 LAB — SENSORY NEUROPATHY PROFILE: NORMAL

## 2023-02-24 ENCOUNTER — PATIENT MESSAGE (OUTPATIENT)
Dept: ORTHOPEDICS | Facility: CLINIC | Age: 73
End: 2023-02-24
Payer: COMMERCIAL

## 2023-02-24 DIAGNOSIS — M79.642 LEFT HAND PAIN: Primary | ICD-10-CM

## 2023-02-25 ENCOUNTER — PATIENT MESSAGE (OUTPATIENT)
Dept: NEUROLOGY | Facility: CLINIC | Age: 73
End: 2023-02-25
Payer: COMMERCIAL

## 2023-02-25 ENCOUNTER — HOSPITAL ENCOUNTER (OUTPATIENT)
Dept: RADIOLOGY | Facility: HOSPITAL | Age: 73
Discharge: HOME | End: 2023-02-25
Attending: ORTHOPAEDIC SURGERY
Payer: COMMERCIAL

## 2023-02-25 DIAGNOSIS — M79.642 LEFT HAND PAIN: ICD-10-CM

## 2023-02-25 PROCEDURE — 73130 X-RAY EXAM OF HAND: CPT | Mod: 26,LT,, | Performed by: RADIOLOGY

## 2023-02-25 PROCEDURE — 73130 XR HAND COMPLETE 3 VIEW LEFT: ICD-10-PCS | Mod: 26,LT,, | Performed by: RADIOLOGY

## 2023-02-25 PROCEDURE — 73130 X-RAY EXAM OF HAND: CPT | Mod: TC,LT

## 2023-02-27 ENCOUNTER — OFFICE VISIT (OUTPATIENT)
Dept: ORTHOPEDICS | Facility: CLINIC | Age: 73
End: 2023-02-27
Payer: COMMERCIAL

## 2023-02-27 VITALS — WEIGHT: 178 LBS | BODY MASS INDEX: 26.98 KG/M2 | HEIGHT: 68 IN

## 2023-02-27 DIAGNOSIS — G56.93 BILATERAL NEUROPATHY OF UPPER EXTREMITIES: ICD-10-CM

## 2023-02-27 DIAGNOSIS — G56.21 CUBITAL TUNNEL SYNDROME ON RIGHT: ICD-10-CM

## 2023-02-27 DIAGNOSIS — G60.0 CHARCOT-MARIE-TOOTH DISEASE: ICD-10-CM

## 2023-02-27 DIAGNOSIS — M72.0 DUPUYTREN'S CONTRACTURE OF BOTH HANDS: Primary | ICD-10-CM

## 2023-02-27 PROCEDURE — 99999 PR PBB SHADOW E&M-EST. PATIENT-LVL III: ICD-10-PCS | Mod: PBBFAC,,, | Performed by: ORTHOPAEDIC SURGERY

## 2023-02-27 PROCEDURE — 99213 OFFICE O/P EST LOW 20 MIN: CPT | Mod: PBBFAC | Performed by: ORTHOPAEDIC SURGERY

## 2023-02-27 PROCEDURE — 99999 PR PBB SHADOW E&M-EST. PATIENT-LVL III: CPT | Mod: PBBFAC,,, | Performed by: ORTHOPAEDIC SURGERY

## 2023-02-27 PROCEDURE — 99214 OFFICE O/P EST MOD 30 MIN: CPT | Mod: S$GLB,,, | Performed by: ORTHOPAEDIC SURGERY

## 2023-02-27 PROCEDURE — 99214 PR OFFICE/OUTPT VISIT, EST, LEVL IV, 30-39 MIN: ICD-10-PCS | Mod: S$GLB,,, | Performed by: ORTHOPAEDIC SURGERY

## 2023-02-27 NOTE — PROGRESS NOTES
Raffy Rutherford  presents for follow up evaluation of   Encounter Diagnoses   Name Primary?    Dupuytren's contracture of both hands Yes    Cubital tunnel syndrome on right     Charcot-Selena-Tooth disease     Bilateral neuropathy of upper extremities      He reports that he had a fall this weekend.  He still reports trouble with sensation, turning pages and tactile feedback, buttons on shirt. He reports numbness in the right ring and long fingers. He also reports Dupuytren's cords in the right hand to the long ring and small fingers that are progressing and interfering with his hand function.  He was pleased with the Xiaflex injection to the left small finger.    He is going on a ski trip at the end of March.    PE:    AA&O x 4.  NAD  HEENT:  NCAT, sclera nonicteric  Lungs:  Respirations are equal and unlabored.  CV:  2+ bilateral upper and lower extremity pulses.  MSK:  Dupuytren's cord right long, ring and small fingers with 25 degree contractures of right long, ring and small finger MP joints. Neurovascularly intact bilaterally.  5/5 thenar and intrinsic musculature strength.  Positive compression and Tinel's right elbow.    X-rays AP, lateral and oblique left hand taken today are independently reviewed by me and shows Eaton stage II basilar thumb arthritis and mild IP joint arthritis.       A/P:  Right cubital tunnel, right hand Dupuytren's contracture  1) With regards to his right cubital tunnel syndrome, I have recommended an EMG/NCS to assess the severity of his neuropathy. We have discussed possible OT after EMG/NCS to work on dexterity.    We have discussed the natural history of Dupuytren's disease as well as treatment options including observation, Xiaflex injections and surgical partial fasciectomy.  The contracture on the right long, ring and small fingers is causing functional problem during his daily activities. The patient would like to think about Xiaflex injection to the right long, ring and  small fingers.  He will require two vials, one for the MP joint of the long and and one for the MP joint of the ring finger.  I have ordered this medication and will call the patient when it is approved and ready.    2) F/U after EMG/NCS for xiaflex injection in April  3) Call with any questions/concerns in the interim        Kaye Solis MD    Please be aware that this note has been generated with the assistance of [employer] voice-to-text.  Please excuse any spelling or grammatical errors.

## 2023-03-09 ENCOUNTER — PATIENT MESSAGE (OUTPATIENT)
Dept: ORTHOPEDICS | Facility: CLINIC | Age: 73
End: 2023-03-09
Payer: COMMERCIAL

## 2023-03-13 ENCOUNTER — PATIENT MESSAGE (OUTPATIENT)
Dept: ORTHOPEDICS | Facility: CLINIC | Age: 73
End: 2023-03-13
Payer: COMMERCIAL

## 2023-03-13 ENCOUNTER — PATIENT MESSAGE (OUTPATIENT)
Dept: NEUROLOGY | Facility: CLINIC | Age: 73
End: 2023-03-13
Payer: COMMERCIAL

## 2023-03-14 ENCOUNTER — TELEPHONE (OUTPATIENT)
Dept: NEUROLOGY | Facility: HOSPITAL | Age: 73
End: 2023-03-14
Payer: COMMERCIAL

## 2023-03-15 ENCOUNTER — OFFICE VISIT (OUTPATIENT)
Dept: ORTHOPEDICS | Facility: CLINIC | Age: 73
End: 2023-03-15
Payer: COMMERCIAL

## 2023-03-15 ENCOUNTER — HOSPITAL ENCOUNTER (OUTPATIENT)
Dept: RADIOLOGY | Facility: HOSPITAL | Age: 73
Discharge: HOME | End: 2023-03-15
Attending: NURSE PRACTITIONER
Payer: COMMERCIAL

## 2023-03-15 VITALS — BODY MASS INDEX: 26.4 KG/M2 | WEIGHT: 174.19 LBS | HEIGHT: 68 IN

## 2023-03-15 DIAGNOSIS — M25.512 LEFT SHOULDER PAIN, UNSPECIFIED CHRONICITY: Primary | ICD-10-CM

## 2023-03-15 DIAGNOSIS — M77.8 LEFT SHOULDER TENDINITIS: Primary | ICD-10-CM

## 2023-03-15 DIAGNOSIS — M25.512 LEFT SHOULDER PAIN, UNSPECIFIED CHRONICITY: ICD-10-CM

## 2023-03-15 PROCEDURE — 73030 X-RAY EXAM OF SHOULDER: CPT | Mod: 26,LT,, | Performed by: RADIOLOGY

## 2023-03-15 PROCEDURE — 20610 DRAIN/INJ JOINT/BURSA W/O US: CPT | Mod: PBBFAC,RT | Performed by: NURSE PRACTITIONER

## 2023-03-15 PROCEDURE — 73030 X-RAY EXAM OF SHOULDER: CPT | Mod: TC,LT

## 2023-03-15 PROCEDURE — 99999 PR PBB SHADOW E&M-EST. PATIENT-LVL IV: ICD-10-PCS | Mod: PBBFAC,,, | Performed by: NURSE PRACTITIONER

## 2023-03-15 PROCEDURE — 99214 OFFICE O/P EST MOD 30 MIN: CPT | Mod: PBBFAC,25 | Performed by: NURSE PRACTITIONER

## 2023-03-15 PROCEDURE — 20610 DRAIN/INJ JOINT/BURSA W/O US: CPT | Mod: LT,S$GLB,, | Performed by: NURSE PRACTITIONER

## 2023-03-15 PROCEDURE — 73030 XR SHOULDER COMPLETE 2 OR MORE VIEWS LEFT: ICD-10-PCS | Mod: 26,LT,, | Performed by: RADIOLOGY

## 2023-03-15 PROCEDURE — 99214 PR OFFICE/OUTPT VISIT, EST, LEVL IV, 30-39 MIN: ICD-10-PCS | Mod: 25,S$GLB,, | Performed by: NURSE PRACTITIONER

## 2023-03-15 PROCEDURE — 99999 PR PBB SHADOW E&M-EST. PATIENT-LVL IV: CPT | Mod: PBBFAC,,, | Performed by: NURSE PRACTITIONER

## 2023-03-15 PROCEDURE — 99214 OFFICE O/P EST MOD 30 MIN: CPT | Mod: 25,S$GLB,, | Performed by: NURSE PRACTITIONER

## 2023-03-15 PROCEDURE — 20610 PR DRAIN/INJECT LARGE JOINT/BURSA: ICD-10-PCS | Mod: LT,S$GLB,, | Performed by: NURSE PRACTITIONER

## 2023-03-15 RX ADMIN — TRIAMCINOLONE ACETONIDE 40 MG: 40 INJECTION, SUSPENSION INTRA-ARTICULAR; INTRAMUSCULAR at 08:03

## 2023-03-15 NOTE — TELEPHONE ENCOUNTER
Called Mr Rutherford earlier today but unfortunately he is not able to talk on the phone at this moment, so we agreed to try again tomorrow morning.

## 2023-03-19 RX ORDER — TRIAMCINOLONE ACETONIDE 40 MG/ML
40 INJECTION, SUSPENSION INTRA-ARTICULAR; INTRAMUSCULAR
Status: COMPLETED | OUTPATIENT
Start: 2023-03-15 | End: 2023-03-15

## 2023-03-19 NOTE — PROGRESS NOTES
CC: Pain of the Left Shoulder      HPI: Pt with c/o left shoulder pain for the past few weeks. The pain is located over the anterior shoulder. The pain is worse at night and with reaching forward and up. 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 This may have contributed to the onset of pain. He is on chronic pain medication managed by Dr. Olivarez, but that hasn't relieved the pain. He has not had a loss of range of motion and he denies change in hand strength. He and his wife are planning a trip soon and he would like to have an injection to relieve the pain prior to that trip.    ROS  General: denies fever and chills  Resp: no c/o sob  CVS: no c/o cp  MSK: c/o left shoulder pain    PE  General: AAOx3, pleasant and cooperative  Resp: respirations even and unlabored  MSK: left shoulder exam  + tenderness over the ac joint  - crepitus  Full range of motion  5/5 strength    Xray:  Reviewed by me with the patient: No fracture or dislocation.  Minimal glenohumeral degenerative changes are present.  The acromioclavicular joint is unremarkable.  No soft tissue abnormality.    Assessment:  Right shoulder djd with tendinitis    Plan:  Left shoulder cortisone injection today  Continue norco as prescribed by Dr. Olivarez  Nsaids as tolerated    Shoulder Injection Procedure Note   Pre-operative Diagnosis: left shoulder pain  Post-operative Diagnosis: same  Indications: left shoulder pain  Anesthesia: none  Procedure Details   Verbal consent was obtained for the procedure. The injection site was identified and the skin was prepared with alcohol. The left shoulder was injected from a posterior approach with 1 ml of Kenalog and 4 ml Lidocaine under sterile technique using a 22 gauge 1 1/2 inch needle. The needle was removed and the area cleansed and dressed.  Complications:  None; patient tolerated the procedure well.  Patient advised to rest the shoulder today, using ice as needed for comfort  and swelling. he did receive immediate relief of the shoulder pain. he was told this would be short lived and is secondary to the lidocaine. he may have an increase in discomfort tonight followed by steady improvement over the next several days. It may take 1-2 weeks following the injection to get the full benefit of the medication.

## 2023-03-20 ENCOUNTER — OFFICE VISIT (OUTPATIENT)
Dept: NEUROLOGY | Facility: CLINIC | Age: 73
End: 2023-03-20
Payer: MEDICARE

## 2023-03-20 DIAGNOSIS — G62.9 NEUROPATHY: Primary | ICD-10-CM

## 2023-03-20 PROCEDURE — 99214 PR OFFICE/OUTPT VISIT, EST, LEVL IV, 30-39 MIN: ICD-10-PCS | Mod: 95,,, | Performed by: PSYCHIATRY & NEUROLOGY

## 2023-03-20 PROCEDURE — 99214 OFFICE O/P EST MOD 30 MIN: CPT | Mod: 95,,, | Performed by: PSYCHIATRY & NEUROLOGY

## 2023-03-21 NOTE — PROGRESS NOTES
The patient location is: home  The chief complaint leading to consultation is: neuropathy, discussion of blood tests results    Visit type: audiovisual    Face to Face time with patient: 30 minutes of total time spent on the encounter, which includes face to face time and non-face to face time preparing to see the patient (eg, review of tests), Obtaining and/or reviewing separately obtained history, Documenting clinical information in the electronic or other health record, Independently interpreting results (not separately reported) and communicating results to the patient/family/caregiver, or Care coordination (not separately reported).   Each patient to whom he or she provides medical services by telemedicine is:  (1) informed of the relationship between the physician and patient and the respective role of any other health care provider with respect to management of the patient; and (2) notified that he or she may decline to receive medical services by telemedicine and may withdraw from such care at any time.    Notes:    HPI:  I saw Mr Rutherford in a follow up virtual visit today.  He is well known to this office, previously seen by our headache clinic and neuromuscular specialists.  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  Reports no interval neurological developments since his last office visit.  Said that his main issue remains losing muscle bulk in his legs and that he gets some benefit from Lyrica in terms of his neuropathic pain.  Endorses Imbalance and falls.  Blood tests searching for a potential autoimmune or paraneoplastic cause of neuropathy were independently reviewed by myself and are unimpressive, with only a very slight elevation of P/Q Type Calcium Channel Ab (0.04 nmol/L) of unclear  clinical significance.  Doing PT but doubts he is getting any benefit from it.  Of note, he tells me that he is interested in repeating genetic neuropathy testing, specifically searching to confirm SORDS as there was a suggestion that this could be the etiology of his neuropathy.        Physical and Neuro-exam: unable to perform during this virtual visit.      Assessment:  73 y/o with HLD, migraine, fibromyalgia, s/p bilateral CTS surgery, chronic low back pain due to degenerative disc disease s/p fusion,  prostate cancer, depression, GERD, WINNIE, and long standing progressive  neuropathy deemed to be consistent with severe motor hereditary neuropathy.  Had an ample conversation with Mr Rutherford regarding results of his paraneoplastic and autoimmune neuropathy and conveyed my impression that theses results don't appear to supper a paraneoplastic or autoimmune etiology.  We discussed repeating genetic testing for neuropathy and ultimately considering a nerve biopsy trying to identify a definite etiology for his progressive neuropathy.  I suggested getting the input of our neuromuscular specialist here at the main campus and see what other diagnostic options we have at this moment.  Will also get the input of PT regarding a more comprehensive therapy program.   Plan:  1) Progressive neuropathy: as above  2) Chronic low back pain due to degenerative disc disease s/p fusion  3) HLD  4) Migraine  5) Fibromyalgia  6) S/p bilateral CTS surgery  7) Prostate cancer  8) Depression  9) GERD  10) WINNIE

## 2023-03-21 NOTE — PROGRESS NOTES
Raffy Rutherford Jr. is a 72 y.o. year old male that  presents with No chief complaint on file.  .     HPI:      Past Medical History:   Diagnosis Date    Allergy     Arthritis     Back pain     after trauma beginning in 195    Cataract     Chronic pain     neck and left shoulder    Cluster headache 2013    Colon polyps 2007 2007-2019: TA x5, HP x3    Degenerative disc disease     Depression     Diverticulitis 12/2013    Fibromyalgia 2013    GERD (gastroesophageal reflux disease)     Hepatitis 1970's    A    History of prostate biopsy 2002    Hyperlipidemia     Joint pain     Prostate cancer 07/2021    Sleep apnea     Thyroid nodule 7/16/2014    Tubular adenoma of colon 2007    5 removed [id number]    Visual disturbance 2012    problems after cataract surgery     Social History     Socioeconomic History    Marital status:    Occupational History    Occupation: Psychotherpist    Tobacco Use    Smoking status: Never    Smokeless tobacco: Never   Substance and Sexual Activity    Alcohol use: Yes     Comment: occasion    Drug use: No    Sexual activity: Yes     Partners: Female     Past Surgical History:   Procedure Laterality Date    BACK SURGERY      CARPAL TUNNEL RELEASE Right 5/18/2021    Procedure: RELEASE, CARPAL TUNNEL;  Surgeon: Kaye Solis MD;  Location: [facility];  Service: Orthopedics;  Laterality: Right;    CATARACT EXTRACTION W/  INTRAOCULAR LENS IMPLANT Bilateral     CHOLECYSTECTOMY      COLONOSCOPY N/A 12/17/2019    Normal - Repeat 5yrs    COLONOSCOPY  2007 2007 TA x2, 2011 TA x3, 2014 HP x3, 2019 normal    COSMETIC SURGERY  2/10/2015    Direct mid-forehead brow lift    COSMETIC SURGERY  2/10/2015    Bilateral upper lid blepahroplasty    CYSTOSCOPY WITH URETEROSCOPY, RETROGRADE PYELOGRAPHY, AND INSERTION OF STENT Left 8/19/2020    Procedure: CYSTOSCOPY, WITH RETROGRADE PYELOGRAM AND URETERAL STENT INSERTION;  Surgeon: Katelynn George MD;  Location: [facility] OR;  Service: Urology;   Laterality: Left;    ESOPHAGOGASTRODUODENOSCOPY N/A 12/17/2019    Procedure: ESOPHAGOGASTRODUODENOSCOPY (EGD);  Surgeon: Amadou Hardin MD;  Location: [address]);  Service: Endoscopy;  Laterality: N/A;    EYE SURGERY      FUSION OF LUMBAR SPINE BY ANTERIOR APPROACH N/A 8/20/2020    Procedure: FUSION, SPINE, LUMBAR, ANTERIOR APPROACH L5-S1 ALIF Stand Alone;  Surgeon: Jason Caldwell MD;  Location: [facility];  Service: Neurosurgery;  Laterality: N/A;    HEMORRHOID SURGERY      with complication of chronic bleeding for 6 weeks     INJECTION OF STEROID Right 12/6/2018    Procedure: INJECTION, STEROID Right SI Joint Block and Steroid Injection;  Surgeon: Jason Caldwell MD;  Location: [facility];  Service: Neurosurgery;  Laterality: Right;  Procedure: Right SI Joint Block and Steroid Injection  Surgery Time: 30 MIN  LOS: 0  Anesthesia: MAC  Radiology: C-arm  Bed: [place] [id number] Poster  Position: Prone    INJECTION OF STEROID Right 9/19/2019    Procedure: INJECTION, STEROID Procedure: Right SI joint block nd steroid injection;  Surgeon: Jason Caldwell MD;  Location: [facility];  Service: Neurosurgery;  Laterality: Right;  Procedure: Right SI joint block nd steroid injection  Surgery Time: 30 mins  LOS:   Anesthesia: General MAC  Radiology:C-arm  Bed: Regular Bed  Position: Prone    IRRIGATION AND DEBRIDEMENT OF UPPER EXTREMITY Left 4/7/2022    Procedure: IRRIGATION AND DEBRIDEMENT, UPPER EXTREMITY;  Surgeon: Kaye Solis MD;  Location: [facility];  Service: Orthopedics;  Laterality: Left;    JOINT REPLACEMENT      KNEE SURGERY      involving arthroscopic surgery to both knees    REPAIR OF EXTENSOR TENDON Left 4/7/2022    Procedure: REPAIR, TENDON, EXTENSOR thumb, EPB and EPL;  Surgeon: Kaye Solis MD;  Location: [facility];  Service: Orthopedics;  Laterality: Left;    SINUS SURGERY      left molar and sinus surgery for trigeminal neuralgia    SPINAL FUSION  06/22/2015    L3-L5 XLIF/TANA    TOTAL HIP ARTHROPLASTY   4/2012    Pt states he had total hip replacement on his left hip.    ULNAR NERVE TRANSPOSITION Left 12/16/2020    Procedure: TRANSPOSITION, NERVE, ULNAR - left carpal and cubital tunnel releases;  Surgeon: Addi Bauer MD;  Location: [facility];  Service: Orthopedics;  Laterality: Left;     Family History   Problem Relation Age of Onset    Stroke Mother 86    Colon cancer Mother         early/mid-60s    Uterine cancer Mother 77    Pancreatitis Brother         acute, now s/p nathalia    Diabetes Brother     Macular degeneration Brother     Other Brother         foot drop    Other Father 44        pituitary tumor- CA vs benign?    Heart attack Maternal Grandfather         suspected, 50s    Migraines Sister     Crohn's disease Sister         w/ assoc joint issues    Arthritis Sister     Tremor Daughter     Irritable bowel syndrome Son         leaning toward Crohn's dx    Neurological Disorders Son         nonspecific, benign fasciculations of calves    Tremor Son     Jaundice Grandchild     Other Maternal Uncle         memory issue    Other Maternal Uncle         memory issue    Cancer Maternal Cousin         origin? maybe thyroid? 40s?    Melanoma Neg Hx     Amblyopia Neg Hx     Blindness Neg Hx     Cataracts Neg Hx     Glaucoma Neg Hx     Hypertension Neg Hx     Retinal detachment Neg Hx     Strabismus Neg Hx     Thyroid disease Neg Hx     Allergic rhinitis Neg Hx     Allergies Neg Hx     Angioedema Neg Hx     Asthma Neg Hx     Eczema Neg Hx     Urticaria Neg Hx     Rhinitis Neg Hx     Immunodeficiency Neg Hx     Atopy Neg Hx            Review of Systems  General ROS: negative for chills, fever or weight loss  Psychological ROS: negative for hallucination, depression or suicidal ideation  Ophthalmic ROS: negative for blurry vision, photophobia or eye pain  ENT ROS: negative for epistaxis, sore throat or rhinorrhea  Respiratory ROS: no cough, shortness of breath, or wheezing  Cardiovascular ROS: no chest pain or dyspnea  on exertion  Gastrointestinal ROS: no abdominal pain, change in bowel habits, or black/ bloody stools  Genito-Urinary ROS: no dysuria, trouble voiding, or hematuria  Musculoskeletal ROS: negative for gait disturbance or muscular weakness  Neurological ROS: no syncope or seizures; no ataxia  Dermatological ROS: negative for pruritis, rash and jaundice      Physical Exam:  There were no vitals taken for this visit.  General appearance: alert, cooperative, no distress  Constitutional:Oriented to person, place, and time.appears well-developed and well-nourished.   HEENT: Normocephalic, atraumatic, neck symmetrical, no nasal discharge   Eyes: conjunctivae/corneas clear, PERRL, EOM's intact  Lungs: clear to auscultation bilaterally, no dullness to percussion bilaterally  Heart: regular rate and rhythm without rub; no displacement of the PMI   Abdomen: soft, non-tender; bowel sounds normoactive; no organomegaly  Extremities: extremities symmetric; no clubbing, cyanosis, or edema  Integument: Skin color, texture, turgor normal; no rashes; hair distrubution normal  Neurologic: Alert and oriented X 3, normal strength, normal coordination and gait  Psychiatric: no pressured speech; normal affect; no evidence of impaired cognition     LABS:    Complete Blood Count  Lab Results   Component Value Date    RBC 4.98 03/25/2022    HGB 15.2 03/25/2022    HCT 47.5 03/25/2022    MCV 95 03/25/2022    MCH 30.5 03/25/2022    MCHC 32.0 03/25/2022    RDW 12.6 03/25/2022     03/25/2022    MPV 9.9 03/25/2022    GRAN 3.2 03/25/2022    GRAN 53.2 03/25/2022    LYMPH 2.2 03/25/2022    LYMPH 37.6 03/25/2022    MONO 0.4 03/25/2022    MONO 6.7 03/25/2022    EOS 0.1 03/25/2022    BASO 0.03 03/25/2022    EOSINOPHIL 1.8 03/25/2022    BASOPHIL 0.5 03/25/2022    DIFFMETHOD Automated 03/25/2022       Comprehensive Metabolic Panel  Lab Results   Component Value Date    GLU 99 01/10/2023    BUN 15 01/10/2023    CREATININE 0.8 01/10/2023      01/10/2023    K 4.5 01/10/2023     01/10/2023    PROT 7.5 01/10/2023    ALBUMIN 4.3 01/10/2023    BILITOT 0.7 01/10/2023    AST 22 01/10/2023    ALKPHOS 37 (L) 01/10/2023    CO2 30 (H) 01/10/2023    ALT 22 01/10/2023    ANIONGAP 7 (L) 01/10/2023    EGFRNONAA >60.0 03/25/2022    ESTGFRAFRICA >60.0 03/25/2022       TSH  Lab Results   Component Value Date    TSH 1.025 07/12/2022         Assessment:  No diagnosis found.  There were no encounter diagnoses.      Plan:  No orders of the defined types were placed in this encounter.          Kashmir Davidson MD

## 2023-03-23 ENCOUNTER — OFFICE VISIT (OUTPATIENT)
Dept: OTOLARYNGOLOGY | Facility: CLINIC | Age: 73
End: 2023-03-23
Payer: COMMERCIAL

## 2023-03-23 VITALS — SYSTOLIC BLOOD PRESSURE: 109 MMHG | DIASTOLIC BLOOD PRESSURE: 69 MMHG | HEART RATE: 60 BPM

## 2023-03-23 DIAGNOSIS — H93.11 TINNITUS, RIGHT EAR: Primary | ICD-10-CM

## 2023-03-23 DIAGNOSIS — Z78.9 HISTORY OF EXCESSIVE CERUMEN: ICD-10-CM

## 2023-03-23 DIAGNOSIS — H61.23 IMPACTED CERUMEN, BILATERAL: ICD-10-CM

## 2023-03-23 PROCEDURE — 1126F AMNT PAIN NOTED NONE PRSNT: CPT | Mod: CPTII,S$GLB,, | Performed by: OTOLARYNGOLOGY

## 2023-03-23 PROCEDURE — 3078F DIAST BP <80 MM HG: CPT | Mod: CPTII,S$GLB,, | Performed by: OTOLARYNGOLOGY

## 2023-03-23 PROCEDURE — 69210 REMOVE IMPACTED EAR WAX UNI: CPT | Mod: S$GLB,,, | Performed by: OTOLARYNGOLOGY

## 2023-03-23 PROCEDURE — 1126F PR PAIN SEVERITY QUANTIFIED, NO PAIN PRESENT: ICD-10-PCS | Mod: CPTII,S$GLB,, | Performed by: OTOLARYNGOLOGY

## 2023-03-23 PROCEDURE — 1157F PR ADVANCE CARE PLAN OR EQUIV PRESENT IN MEDICAL RECORD: ICD-10-PCS | Mod: CPTII,S$GLB,, | Performed by: OTOLARYNGOLOGY

## 2023-03-23 PROCEDURE — 99499 NO LOS: ICD-10-PCS | Mod: S$GLB,,, | Performed by: OTOLARYNGOLOGY

## 2023-03-23 PROCEDURE — 1159F MED LIST DOCD IN RCRD: CPT | Mod: CPTII,S$GLB,, | Performed by: OTOLARYNGOLOGY

## 2023-03-23 PROCEDURE — 1101F PT FALLS ASSESS-DOCD LE1/YR: CPT | Mod: CPTII,S$GLB,, | Performed by: OTOLARYNGOLOGY

## 2023-03-23 PROCEDURE — 69210 PR REMOVAL IMPACTED CERUMEN REQUIRING INSTRUMENTATION, UNILATERAL: ICD-10-PCS | Mod: S$GLB,,, | Performed by: OTOLARYNGOLOGY

## 2023-03-23 PROCEDURE — 3288F PR FALLS RISK ASSESSMENT DOCUMENTED: ICD-10-PCS | Mod: CPTII,S$GLB,, | Performed by: OTOLARYNGOLOGY

## 2023-03-23 PROCEDURE — 1159F PR MEDICATION LIST DOCUMENTED IN MEDICAL RECORD: ICD-10-PCS | Mod: CPTII,S$GLB,, | Performed by: OTOLARYNGOLOGY

## 2023-03-23 PROCEDURE — 99999 PR PBB SHADOW E&M-EST. PATIENT-LVL III: CPT | Mod: PBBFAC,,, | Performed by: OTOLARYNGOLOGY

## 2023-03-23 PROCEDURE — 3288F FALL RISK ASSESSMENT DOCD: CPT | Mod: CPTII,S$GLB,, | Performed by: OTOLARYNGOLOGY

## 2023-03-23 PROCEDURE — 3074F SYST BP LT 130 MM HG: CPT | Mod: CPTII,S$GLB,, | Performed by: OTOLARYNGOLOGY

## 2023-03-23 PROCEDURE — 99999 PR PBB SHADOW E&M-EST. PATIENT-LVL III: ICD-10-PCS | Mod: PBBFAC,,, | Performed by: OTOLARYNGOLOGY

## 2023-03-23 PROCEDURE — 3074F PR MOST RECENT SYSTOLIC BLOOD PRESSURE < 130 MM HG: ICD-10-PCS | Mod: CPTII,S$GLB,, | Performed by: OTOLARYNGOLOGY

## 2023-03-23 PROCEDURE — 99499 UNLISTED E&M SERVICE: CPT | Mod: S$GLB,,, | Performed by: OTOLARYNGOLOGY

## 2023-03-23 PROCEDURE — 1157F ADVNC CARE PLAN IN RCRD: CPT | Mod: CPTII,S$GLB,, | Performed by: OTOLARYNGOLOGY

## 2023-03-23 PROCEDURE — 3078F PR MOST RECENT DIASTOLIC BLOOD PRESSURE < 80 MM HG: ICD-10-PCS | Mod: CPTII,S$GLB,, | Performed by: OTOLARYNGOLOGY

## 2023-03-23 PROCEDURE — 1101F PR PT FALLS ASSESS DOC 0-1 FALLS W/OUT INJ PAST YR: ICD-10-PCS | Mod: CPTII,S$GLB,, | Performed by: OTOLARYNGOLOGY

## 2023-03-23 NOTE — PROGRESS NOTES
CC:  HPI: Mr. 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  He also has a history of excessive cerumen production causing recurrent accumulation in his ear canals affecting requiring cleaning about every 3 months now.  He presents today for ear cleaning procedures specifically prior to my care home.  His ears were last cleaned by me in late January this year.    His November 2022 audiogram is duplicated below for reference.        Past Medical History:   Diagnosis Date    Allergy     Arthritis     Back pain     after trauma beginning in 89    Cataract     Chronic pain     neck and left shoulder    Cluster headache 2013    Colon polyps 2007 2007-2019: TA x5, HP x3    Degenerative disc disease     Depression     Diverticulitis 12/2013    Fibromyalgia 2013    GERD (gastroesophageal reflux disease)     Hepatitis 1970's    A    History of prostate biopsy 2002    Hyperlipidemia     Joint pain     Prostate cancer 07/2021    Sleep apnea     Thyroid nodule 7/16/2014    Tubular adenoma of colon 2007    5 removed [id number]    Visual disturbance 2012    problems after cataract surgery   Allergies:  Oxycodone, Coumadin, Alphagan    Current Outpatient Medications on File Prior to Visit   Medication Sig Dispense Refill    atogepant (QULIPTA) 30 mg Tab Take 1 tablet (30 mg) by mouth Daily. 30 tablet 2    atogepant (QULIPTA) 60 mg Tab Take 1 tablet by mouth once daily. 30 tablet 5    atorvastatin (LIPITOR) 40 MG tablet Take 1 tablet (40 mg total) by mouth once daily. 90 tablet 3    butalbital-acetaminophen-caffeine -40 mg (FIORICET, ESGIC) -40 mg per tablet Take 1 tablet by mouth daily as needed for 30 days. 30 tablet 0    celecoxib (CELEBREX) 100 MG capsule Take 100 mg by mouth.      clonazePAM  (KLONOPIN) 0.5 MG tablet Take 1 tablet by mouth twice a day as needed for anxiety 60 tablet 5    COVID-19 antigen test Kit Flowflex COVID-19 Antigen Home Test kit      ergocalciferol (VITAMIN D2) 50,000 unit Cap Take 1 capsule (50,000 Units total) by mouth every 7 days. 12 capsule 3    HYDROcodone-acetaminophen (NORCO) 5-325 mg per tablet Take 1 tablet by mouth 4 times a day as needed for 30 days. 120 tablet 0    lamoTRIgine (LAMICTAL) 200 MG tablet Take 1 tablet (200 mg total) by mouth once daily. 90 tablet 3    pregabalin (LYRICA) 25 MG capsule Take 2 capsules at night. Take 1 capsule in the evening. 90 capsule 0    pregabalin (LYRICA) 75 MG capsule Take 2 capsules by mouth at night. 30 day supply. 60 capsule 0    RABEprazole (ACIPHEX) 20 mg tablet Take 1 tablet (20 mg total) by mouth 2 (two) times daily. 180 tablet 3    selegiline (EMSAM) 9 mg/24 hr Place 1 patch onto the skin once daily. 30 patch 11    tadalafiL (CIALIS) 5 MG tablet Take 1 tablet (5 mg total) by mouth daily as needed for Erectile Dysfunction. 120 tablet 3    terazosin (HYTRIN) 5 MG capsule Take 1 capsule (5 mg total) by mouth every evening. 90 capsule 3    traZODone (DESYREL) 50 MG tablet Take 1 tablet (50 mg total) by mouth every evening. 90 tablet 3     Current Facility-Administered Medications on File Prior to Visit   Medication Dose Route Frequency Provider Last Rate Last Admin    onabotulinumtoxina injection 200 Units  200 Units Intramuscular q12 weeks MIKAYLA Richards   200 Units at 02/13/23 1349       PE:  General: Alert and oriented articulate gentleman in no acute distress  Both ears were examined under the microscope.  Procedures:  Semi occlusive dry cerumen impactions are carefully extracted from each ear canal with use of micro forceps +/-blunt curette.  Patient reports slight improvement in his hearing and slightly diminished tinnitus perception after the procedures.  There is no evidence of otitis externa of either canal.   Both TMs are clear and intact as visualized directly.      DIAGNOSIS:   1. Tinnitus, right ear        2. History of excessive cerumen        3. Impacted cerumen, bilateral            PLAN: Semi-occlusive cerumen impactions removed from both eacs  RTC 3 months for ear cleaning   Pt. may follow up with Dr. AGUSTIN Ross for ear care ( or NP Ivania Li for ear cleaning only ) or Dr. MONTY Rainey for otology specialty care

## 2023-03-23 NOTE — PATIENT INSTRUCTIONS
Semi-occlusive cerumen impactions removed from both eacs  RTC 3 months for ear cleaning   Pt. may follow up with Dr. AGUSTIN Ross for ear care ( or LINDA Li for ear cleaning only ) or Dr. MONTY Rainey for otology specialty care

## 2023-03-28 ENCOUNTER — TELEPHONE (OUTPATIENT)
Dept: NEUROLOGY | Facility: HOSPITAL | Age: 73
End: 2023-03-28
Payer: COMMERCIAL

## 2023-03-28 NOTE — TELEPHONE ENCOUNTER
Called Mr Rutherford to give him an update regarding further neuropathy testing but he was not available on the phone and thus I left a message asking him to call me back at his earliest convenience.

## 2023-03-29 ENCOUNTER — PATIENT MESSAGE (OUTPATIENT)
Dept: NEUROLOGY | Facility: CLINIC | Age: 73
End: 2023-03-29
Payer: COMMERCIAL

## 2023-04-07 RX ORDER — ERGOCALCIFEROL 1.25 MG/1
50000 CAPSULE ORAL
Qty: 12 CAPSULE | Refills: 3 | Status: SHIPPED | OUTPATIENT
Start: 2023-04-07 | End: 2023-08-28 | Stop reason: SDUPTHER

## 2023-04-07 NOTE — TELEPHONE ENCOUNTER
Refill Routing Note   Medication(s) are not appropriate for processing by [facility] for the following reason(s):      Medication outside of protocol    [facility] action(s):  Route            Appointments  past 12m or future 3m with PCP    Date Provider   Last Visit   1/9/2023 Haseeb Puentes MD   Next Visit   Visit date not found Haseeb Puentes MD   ED visits in past 90 days: 0        Note composed:8:40 PM 04/06/2023

## 2023-04-11 ENCOUNTER — PATIENT MESSAGE (OUTPATIENT)
Dept: NEUROLOGY | Facility: CLINIC | Age: 73
End: 2023-04-11
Payer: COMMERCIAL

## 2023-04-11 ENCOUNTER — PATIENT MESSAGE (OUTPATIENT)
Dept: RADIATION ONCOLOGY | Facility: CLINIC | Age: 73
End: 2023-04-11
Payer: COMMERCIAL

## 2023-04-11 ENCOUNTER — PATIENT MESSAGE (OUTPATIENT)
Dept: ORTHOPEDICS | Facility: CLINIC | Age: 73
End: 2023-04-11
Payer: COMMERCIAL

## 2023-04-12 DIAGNOSIS — G62.9 NEUROPATHY: Primary | ICD-10-CM

## 2023-04-14 ENCOUNTER — OFFICE VISIT (OUTPATIENT)
Dept: OPTOMETRY | Facility: CLINIC | Age: 73
End: 2023-04-14
Payer: COMMERCIAL

## 2023-04-14 DIAGNOSIS — H04.123 DRY EYE SYNDROME, BILATERAL: Primary | ICD-10-CM

## 2023-04-14 DIAGNOSIS — H52.203 ASTIGMATISM OF BOTH EYES, UNSPECIFIED TYPE: ICD-10-CM

## 2023-04-14 DIAGNOSIS — H18.452 SALZMANN'S NODULAR DEGENERATION OF CORNEA OF LEFT EYE: ICD-10-CM

## 2023-04-14 DIAGNOSIS — H43.813 PVD (POSTERIOR VITREOUS DETACHMENT), BILATERAL: ICD-10-CM

## 2023-04-14 DIAGNOSIS — G47.30 SLEEP APNEA, UNSPECIFIED TYPE: ICD-10-CM

## 2023-04-14 DIAGNOSIS — Z96.1 PSEUDOPHAKIA OF BOTH EYES: ICD-10-CM

## 2023-04-14 DIAGNOSIS — H52.4 PRESBYOPIA: ICD-10-CM

## 2023-04-14 PROCEDURE — 92015 DETERMINE REFRACTIVE STATE: CPT | Mod: S$GLB,,, | Performed by: OPTOMETRIST

## 2023-04-14 PROCEDURE — 99999 PR PBB SHADOW E&M-EST. PATIENT-LVL III: ICD-10-PCS | Mod: PBBFAC,,, | Performed by: OPTOMETRIST

## 2023-04-14 PROCEDURE — 92014 COMPRE OPH EXAM EST PT 1/>: CPT | Mod: S$GLB,,, | Performed by: OPTOMETRIST

## 2023-04-14 PROCEDURE — 92015 PR REFRACTION: ICD-10-PCS | Mod: S$GLB,,, | Performed by: OPTOMETRIST

## 2023-04-14 PROCEDURE — 99999 PR PBB SHADOW E&M-EST. PATIENT-LVL III: CPT | Mod: PBBFAC,,, | Performed by: OPTOMETRIST

## 2023-04-14 PROCEDURE — 92014 PR EYE EXAM, EST PATIENT,COMPREHESV: ICD-10-PCS | Mod: S$GLB,,, | Performed by: OPTOMETRIST

## 2023-04-14 RX ORDER — VARENICLINE 0.03 MG/.05ML
1 SPRAY NASAL 2 TIMES DAILY
Qty: 8.4 ML | Refills: 11 | Status: SHIPPED | OUTPATIENT
Start: 2023-04-14

## 2023-04-14 NOTE — PROGRESS NOTES
HPI    Last eye exam was 1/3/22 with Dr. Ocampo.  Patient states still getting headaches and glare problems when looking at   electronics even with blue light filter on glasses-worse in the morning.   Unsure if needs to go back to see Ophthalmologist again. Feels distance   vision is fine without glasses. Not using any drops.  Patient denies diplopia, flashes/floaters, and pain.    Last edited by Yudi Arce MA on 4/14/2023  8:07 AM.            Assessment /Plan     For exam results, see Encounter Report.    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 Contributing to glare  Sleep apnea, unspecified type    Start varenicline (TYRVAYA) 0.03 mg/spray sprm; 1 spray by Each Nostril route 2 (two) times a day.  Dispense: 8.4 mL; Refill: 11   Start ivizia QID OU   Failed xiidra and restasis   Consider plugs in the future    PVD (posterior vitreous detachment), bilateral   Stable, OD>OS  Good internal ocular health:Monitor yearly    Pseudophakia of both eyes  Presbyopia  Astigmatism of both eyes, unspecified type   Rx change OD?   Recheck next visit May 8

## 2023-04-17 ENCOUNTER — PATIENT MESSAGE (OUTPATIENT)
Dept: ORTHOPEDICS | Facility: CLINIC | Age: 73
End: 2023-04-17
Payer: COMMERCIAL

## 2023-04-18 ENCOUNTER — PATIENT MESSAGE (OUTPATIENT)
Dept: NEUROLOGY | Facility: CLINIC | Age: 73
End: 2023-04-18
Payer: COMMERCIAL

## 2023-04-18 ENCOUNTER — PATIENT MESSAGE (OUTPATIENT)
Dept: RADIATION ONCOLOGY | Facility: CLINIC | Age: 73
End: 2023-04-18
Payer: COMMERCIAL

## 2023-04-18 ENCOUNTER — PATIENT MESSAGE (OUTPATIENT)
Dept: OPTOMETRY | Facility: CLINIC | Age: 73
End: 2023-04-18
Payer: COMMERCIAL

## 2023-04-18 DIAGNOSIS — G43.909 MIGRAINE WITHOUT STATUS MIGRAINOSUS, NOT INTRACTABLE, UNSPECIFIED MIGRAINE TYPE: Primary | ICD-10-CM

## 2023-04-19 DIAGNOSIS — M25.512 ACUTE PAIN OF LEFT SHOULDER: Primary | ICD-10-CM

## 2023-04-20 ENCOUNTER — PATIENT MESSAGE (OUTPATIENT)
Dept: UROLOGY | Facility: CLINIC | Age: 73
End: 2023-04-20
Payer: COMMERCIAL

## 2023-04-20 DIAGNOSIS — N52.9 ED (ERECTILE DYSFUNCTION) OF ORGANIC ORIGIN: ICD-10-CM

## 2023-04-20 DIAGNOSIS — N13.8 BPH WITH URINARY OBSTRUCTION: Primary | ICD-10-CM

## 2023-04-20 DIAGNOSIS — N40.1 BPH WITH URINARY OBSTRUCTION: Primary | ICD-10-CM

## 2023-04-21 ENCOUNTER — PATIENT MESSAGE (OUTPATIENT)
Dept: UROLOGY | Facility: CLINIC | Age: 73
End: 2023-04-21
Payer: COMMERCIAL

## 2023-04-21 RX ORDER — SILDENAFIL 100 MG/1
100 TABLET, FILM COATED ORAL
Qty: 4 TABLET | Refills: 12 | Status: SHIPPED | OUTPATIENT
Start: 2023-04-21 | End: 2023-05-05 | Stop reason: SDUPTHER

## 2023-04-23 ENCOUNTER — PATIENT MESSAGE (OUTPATIENT)
Dept: NEUROLOGY | Facility: CLINIC | Age: 73
End: 2023-04-23
Payer: COMMERCIAL

## 2023-04-24 ENCOUNTER — CLINICAL SUPPORT (OUTPATIENT)
Dept: REHABILITATION | Facility: HOSPITAL | Age: 73
End: 2023-04-24
Payer: COMMERCIAL

## 2023-04-24 ENCOUNTER — LAB VISIT (OUTPATIENT)
Dept: LAB | Facility: HOSPITAL | Age: 73
End: 2023-04-24
Attending: RADIOLOGY
Payer: COMMERCIAL

## 2023-04-24 DIAGNOSIS — C61 PROSTATE CANCER: ICD-10-CM

## 2023-04-24 DIAGNOSIS — R29.3 POOR POSTURE: ICD-10-CM

## 2023-04-24 DIAGNOSIS — R20.2 PARESTHESIA: ICD-10-CM

## 2023-04-24 DIAGNOSIS — G62.9 NEUROPATHY: Primary | ICD-10-CM

## 2023-04-24 DIAGNOSIS — M25.512 ACUTE PAIN OF LEFT SHOULDER: Primary | ICD-10-CM

## 2023-04-24 LAB — COMPLEXED PSA SERPL-MCNC: 10.1 NG/ML (ref 0–4)

## 2023-04-24 PROCEDURE — 97140 MANUAL THERAPY 1/> REGIONS: CPT

## 2023-04-24 PROCEDURE — 84153 ASSAY OF PSA TOTAL: CPT | Performed by: RADIOLOGY

## 2023-04-24 PROCEDURE — 36415 COLL VENOUS BLD VENIPUNCTURE: CPT | Performed by: RADIOLOGY

## 2023-04-24 PROCEDURE — 97161 PT EVAL LOW COMPLEX 20 MIN: CPT

## 2023-04-24 PROCEDURE — 97112 NEUROMUSCULAR REEDUCATION: CPT

## 2023-04-24 RX ORDER — FREMANEZUMAB-VFRM 225 MG/1.5ML
225 INJECTION SUBCUTANEOUS
Qty: 1.5 ML | Refills: 5 | Status: SHIPPED | OUTPATIENT
Start: 2023-04-24 | End: 2023-08-24 | Stop reason: SDUPTHER

## 2023-04-24 NOTE — PLAN OF CARE
"[facility]   Physical Therapy Initial Evaluation      Name: Raffy Rutherford Jr.  Clinic Number: [id number]    Therapy Diagnosis:   Encounter Diagnoses   Name Primary?    Acute pain of left shoulder Yes    Poor posture     Paresthesia         Physician: Lisandra Trinh NP    Physician Orders: PT Eval and Treat   Medical Diagnosis from Referral: M25.512 (ICD-10-CM) - Acute pain of left shoulder  Evaluation Date: 4/24/2023  Authorization Period Expiration: 12/31/2023  Plan of Care Expiration: 7/7/2023  Progress Note Due: 10th visit  Visit # / Visits authorized: 1/ 1   FOTO: 1/3    Precautions: Standard     Time In: 1:10 pm  Time Out: 1:56 pm  Total Appointment Time (timed & untimed codes): 46 minutes    SUBJECTIVE     Date of onset: chronic    History of current condition - Raffy reports: left anterior shoulder pain that increases when playing cello. Patient reports intermittent pain throughout the day. Uncomfortable to sleep on left shoulder. Burning pain from anterior shoulder down to elbow, but notes different than numbness and tingling he has experienced in the past. Injection helped for "a little while but not very long." Pain has become worse since onset. Patient reports past PT with row machines/ pull downs: with 25 lbs which bothered his left shoulder more.     Falls: patient reports he looses his balance often but never falls; recent change in AFO for drop foot    Imaging: please see imaging    Prior Therapy: yes  Social History: lives with their spouse  Occupation: counselor; musician   Prior Level of Function: independent   Current Level of Function: able to get dressed by himself but notice shoulder pain. Pain when playing cello.    Pain:  Current 5/10, worst 6/10, best 0/10   Location: left anterior shoulder  Description: Burning  Aggravating Factors: playing cello   Easing Factors: rest    Patients goals: decrease pain     Medical History:   Past Medical History:   Diagnosis " Date    Allergy     Arthritis     Back pain     after trauma beginning in 195    Cataract     Chronic pain     neck and left shoulder    Cluster headache 2013    Colon polyps 2007 2007-2019: TA x5, HP x3    Degenerative disc disease     Depression     Diverticulitis 12/2013    Dry eye syndrome     Fibromyalgia 2013    GERD (gastroesophageal reflux disease)     Hepatitis 1970's    A    History of prostate biopsy 2002    Hyperlipidemia     Joint pain     Prostate cancer 07/2021    PVD (posterior vitreous detachment)     Salzmann's nodular dystrophy of left eye     Sleep apnea     Thyroid nodule 07/16/2014    Trigeminal neuralgia of left side of face     Tubular adenoma of colon 2007    5 removed [id number]    Visual disturbance 2012    problems after cataract surgery       Surgical History:   Raffy Rutherford Jr.  has a past surgical history that includes Knee surgery; Hemorrhoid surgery; Cholecystectomy; Sinus surgery; Eye surgery; Back surgery; Joint replacement; Total hip arthroplasty (4/2012); Cosmetic surgery (2/10/2015); Cosmetic surgery (2/10/2015); Cataract extraction w/  intraocular lens implant (Bilateral); Spinal fusion (06/22/2015); Injection of steroid (Right, 12/6/2018); Injection of steroid (Right, 9/19/2019); Esophagogastroduodenoscopy (N/A, 12/17/2019); Colonoscopy (N/A, 12/17/2019); Cystoscopy with ureteroscopy, retrograde pyelography, and insertion of stent (Left, 8/19/2020); Fusion of lumbar spine by anterior approach (N/A, 8/20/2020); Ulnar nerve transposition (Left, 12/16/2020); Carpal tunnel release (Right, 5/18/2021); Colonoscopy (2007); Repair of extensor tendon (Left, 4/7/2022); and Irrigation and debridement of upper extremity (Left, 4/7/2022).    Medications:   Raffy has a current medication list which includes the following prescription(s): qulipta, qulipta, atorvastatin, butalbital-acetaminophen-caffeine -40 mg, clonazepam, ergocalciferol, ajovy syringe, hydrocodone-acetaminophen,  lamotrigine, pregabalin, pregabalin, rabeprazole, selegiline, sildenafil, tadalafil, terazosin, trazodone, and tyrvaya, and the following Facility-Administered Medications: onabotulinumtoxina.    Allergies:   Review of patient's allergies indicates:   Allergen Reactions    Alphagan [brimonidine]      Patient taking MASO-B Selective Inhibitor Selegiline (Emsam)    Coumadin [warfarin]      itch    Oxycodone      hiccups        OBJECTIVE     Posture: forward head/rounded shoulders    Cervical Spine ROM: limited in all directions    Sensation: upper extremity dermatomes intact    Palpation: TTP anterior shoulder, AC and supraspinatus insertion and proximal biceps tendon    Mobility: GH joint/ scapular hypermobility in all directions. Poor scapular GH rhythm     Shoulder Range of Motion:   R Active (Passive) L Active (Passive)   Flexion WFL WFL   Abduction WFL WFL p!   ER WFL WFL p!   IR WFL WFL      Functional assessment:  ER: B: T5  IR: B TLJ    Upper Extremity Strength: manual muscle grades below   Right  Left   Shoulder FLEX 4/5 Shoulder FLEX 4/5   Shoulder ABD 4/5 Shoulder ABD 4- /5 p!   Shoulder ER 4/5 Shoulder ER 3/5 p!   Shoulder IR 4/5 Shoulder IR 3+/5   Elbow FLEX 5/5 Elbow Flex 5/5   Elbow EXT 5/5 Elbow EXT 5/5   Wrist FLEX 5/5 Wrist FLEX 5/5   Wrist EXT 5/5 Wrist EXT 5/5    Strength WFL  Strength WFL     Special Tests:  Lift off: +  Painful Arc:  Empty can: -  Neer's: -  Cross-body Adduction:NT  Hawkin's Ismael:NT  Speed's: -  Active Compression (Emanuel's) NT      Limitation/Restriction for FOTO shoulder Survey    Therapist reviewed FOTO scores for Raffy ORTIZ Sangeeta Corey on 4/24/2023.   FOTO documents entered into [employer] - see Media section.    Limitation Score: 46%         TREATMENT     Total Treatment time (time-based codes) separate from Evaluation: 20 minutes      Raffy received the treatments listed below:        manual therapy techniques: Joint mobilizations, Myofacial release, and Soft tissue  Mobilization were applied to the: left shoulder for 10 minutes, including:  Manual RHYTHMIC stabilizations  Grade II-III inferior and posterior GH joint MOBILIZATIONS    neuromuscular re-education activities to improve: Proprioception, Posture, and motor control for 10 minutes. The following activities were included:  Resisted IR: 5 seconds, 2x10  Resisted ER with scapular cue: 5 seconds, 2x10 RTB  Resisted scapular retraction: 5 seconds, 2x10 RTB    PATIENT EDUCATION AND HOME EXERCISES     Education provided:   - HEP  -prognosis  -plan of care  -anatomy of shoudler    Written Home Exercises Provided: yes. Exercises were reviewed and Raffy was able to demonstrate them prior to the end of the session.  Raffy demonstrated good  understanding of the education provided. See EMR under Patient Instructions for exercises provided during therapy sessions.    ASSESSMENT     Raffy is a 73 y.o. male referred to outpatient Physical Therapy with a medical diagnosis of Acute pain of left shoulder. Patient presents with s/s consistent with left RTC pathology likely due to impingement syndrome. Patient presents with pain during active end range shoulder with reduced pain during passive range of motion/repetitive motions.Tender to/non tender to palpation of anterior shoulder and RTC and biceps tendon. Patient demonstrates poor posture, decreased motor control of scapulothoracic mm, and poor scapular mobility during GH joint mobility. Pt would benefit from RTC strengthening, and scapulothoracic strengthening and motor control activities to reduce L shoulder pain and improve posture.     Patient prognosis is Good.   Patient will benefit from skilled outpatient Physical Therapy to address the deficits stated above and in the chart below, provide patient /family education, and to maximize patientt's level of independence.     Plan of care discussed with patient: Yes  Patient's spiritual, cultural and educational needs considered and  patient is agreeable to the plan of care and goals as stated below:     Anticipated Barriers for therapy: none    Medical Necessity is demonstrated by the following  History  Co-morbidities and personal factors that may impact the plan of care Co-morbidities:   history of cancer and peripheral nerve disease, drop foot    Personal Factors:   age     moderate   Examination  Body Structures and Functions, activity limitations and participation restrictions that may impact the plan of care Body Regions:   upper extremities    Body Systems:    gross symmetry  ROM  strength  motor control    Participation Restrictions:   none    Activity limitations:   Learning and applying knowledge  no deficits    General Tasks and Commands  no deficits    Communication  no deficits    Mobility  lifting and carrying objects  driving (bike, car, motorcycle)    Self care  dressing    Domestic Life  doing house work (cleaning house, washing dishes, laundry)    Interactions/Relationships  no deficits    Life Areas  no deficits    Community and Social Life  no deficits         moderate   Clinical Presentation stable and uncomplicated low   Decision Making/ Complexity Score: low     Goals:  Short Term Goals(4 weeks)  1. Pt will be independent with HEP to assist PT treatment in restoring pain free motion of the left shoulder. MET  2. Pt will improve impaired shoulder MMTs 1/2 grade B to improve strength for functional tasks.   3. Pt will demonstrate improved postural awareness requiring less than 3 VC during therapeutic activity.      Long Term Goals (8 Weeks):   1. Pt will improve FOTO score to </= 40 % to demonstrate improvements in functional mobility including carrying, moving, and handling objects   2. Pt will improve impaired shoulder MMTs 1 grade B to improve strength for household duties.   3. Pt will demonstrate improved perscapular strength and endurance to show improved OH mobility and activity tolerance.    4. Pt will report no  pain of left shoulder when playing cello      PLAN     Plan of care Certification: 4/24/2023 to 7/7/2023.    Outpatient Physical Therapy 2 times weekly for 10 weeks to include the following interventions: Aquatic Therapy, Cervical/Lumbar Traction, Electrical Stimulation TENS, Gait Training, Manual Therapy, Moist Heat/ Ice, Neuromuscular Re-ed, Orthotic Management and Training, Patient Education, Self Care, Therapeutic Activities, and Therapeutic Exercise functional dry needling.     Yi Cole, PT

## 2023-04-25 ENCOUNTER — PATIENT MESSAGE (OUTPATIENT)
Dept: RADIATION ONCOLOGY | Facility: CLINIC | Age: 73
End: 2023-04-25
Payer: COMMERCIAL

## 2023-04-26 ENCOUNTER — PATIENT MESSAGE (OUTPATIENT)
Dept: UROLOGY | Facility: CLINIC | Age: 73
End: 2023-04-26
Payer: COMMERCIAL

## 2023-04-26 ENCOUNTER — PATIENT MESSAGE (OUTPATIENT)
Dept: ORTHOPEDICS | Facility: CLINIC | Age: 73
End: 2023-04-26
Payer: COMMERCIAL

## 2023-04-26 DIAGNOSIS — C61 PROSTATE CANCER: Primary | ICD-10-CM

## 2023-05-03 ENCOUNTER — PATIENT MESSAGE (OUTPATIENT)
Dept: REHABILITATION | Facility: HOSPITAL | Age: 73
End: 2023-05-03
Payer: COMMERCIAL

## 2023-05-04 ENCOUNTER — PATIENT MESSAGE (OUTPATIENT)
Dept: UROLOGY | Facility: CLINIC | Age: 73
End: 2023-05-04
Payer: COMMERCIAL

## 2023-05-04 ENCOUNTER — PATIENT MESSAGE (OUTPATIENT)
Dept: OPTOMETRY | Facility: CLINIC | Age: 73
End: 2023-05-04
Payer: COMMERCIAL

## 2023-05-04 DIAGNOSIS — N40.1 BPH WITH URINARY OBSTRUCTION: Primary | ICD-10-CM

## 2023-05-04 DIAGNOSIS — N13.8 BPH WITH URINARY OBSTRUCTION: Primary | ICD-10-CM

## 2023-05-04 DIAGNOSIS — R39.9 LOWER URINARY TRACT SYMPTOMS (LUTS): ICD-10-CM

## 2023-05-04 DIAGNOSIS — N52.9 ED (ERECTILE DYSFUNCTION) OF ORGANIC ORIGIN: ICD-10-CM

## 2023-05-05 ENCOUNTER — PATIENT MESSAGE (OUTPATIENT)
Dept: INTERNAL MEDICINE | Facility: CLINIC | Age: 73
End: 2023-05-05
Payer: COMMERCIAL

## 2023-05-05 RX ORDER — SILDENAFIL 100 MG/1
100 TABLET, FILM COATED ORAL
Qty: 4 TABLET | Refills: 12 | Status: SHIPPED | OUTPATIENT
Start: 2023-05-05 | End: 2023-06-04 | Stop reason: SDUPTHER

## 2023-05-05 RX ORDER — TERAZOSIN 2 MG/1
2 CAPSULE ORAL NIGHTLY
Qty: 30 CAPSULE | Refills: 11 | Status: SHIPPED | OUTPATIENT
Start: 2023-05-05 | End: 2023-08-10

## 2023-05-05 NOTE — TELEPHONE ENCOUNTER
Pt reports change in Rx from Dr. Puma Olivarez ([facility])    Pt requesting refill on dosage from Dr. Olivarez.     Please advise

## 2023-05-05 NOTE — TELEPHONE ENCOUNTER
No care due was identified.  [facility] Embedded Care Due Messages. Reference number: [id number].   5/05/2023 4:42:41 PM CDT

## 2023-05-08 ENCOUNTER — OFFICE VISIT (OUTPATIENT)
Dept: OPTOMETRY | Facility: CLINIC | Age: 73
End: 2023-05-08
Payer: COMMERCIAL

## 2023-05-08 ENCOUNTER — IMMUNIZATION (OUTPATIENT)
Dept: INTERNAL MEDICINE | Facility: CLINIC | Age: 73
End: 2023-05-08
Payer: COMMERCIAL

## 2023-05-08 ENCOUNTER — PROCEDURE VISIT (OUTPATIENT)
Dept: NEUROLOGY | Facility: CLINIC | Age: 73
End: 2023-05-08
Payer: COMMERCIAL

## 2023-05-08 VITALS
SYSTOLIC BLOOD PRESSURE: 113 MMHG | DIASTOLIC BLOOD PRESSURE: 64 MMHG | HEART RATE: 74 BPM | WEIGHT: 177.56 LBS | BODY MASS INDEX: 27 KG/M2

## 2023-05-08 DIAGNOSIS — G43.711 CHRONIC MIGRAINE WITHOUT AURA, WITH INTRACTABLE MIGRAINE, SO STATED, WITH STATUS MIGRAINOSUS: ICD-10-CM

## 2023-05-08 DIAGNOSIS — G43.009 MIGRAINE WITHOUT AURA AND WITHOUT STATUS MIGRAINOSUS, NOT INTRACTABLE: ICD-10-CM

## 2023-05-08 DIAGNOSIS — G43.719 INTRACTABLE CHRONIC MIGRAINE WITHOUT AURA AND WITHOUT STATUS MIGRAINOSUS: ICD-10-CM

## 2023-05-08 DIAGNOSIS — G43.909 MIGRAINE WITHOUT STATUS MIGRAINOSUS, NOT INTRACTABLE, UNSPECIFIED MIGRAINE TYPE: ICD-10-CM

## 2023-05-08 DIAGNOSIS — Z23 NEED FOR VACCINATION: Primary | ICD-10-CM

## 2023-05-08 DIAGNOSIS — H18.452 SALZMANN'S NODULAR DEGENERATION OF CORNEA OF LEFT EYE: ICD-10-CM

## 2023-05-08 DIAGNOSIS — H52.4 PRESBYOPIA: Primary | ICD-10-CM

## 2023-05-08 DIAGNOSIS — H52.203 ASTIGMATISM OF BOTH EYES, UNSPECIFIED TYPE: ICD-10-CM

## 2023-05-08 DIAGNOSIS — H04.123 DRY EYE SYNDROME, BILATERAL: ICD-10-CM

## 2023-05-08 DIAGNOSIS — G43.709 CHRONIC MIGRAINE WITHOUT AURA WITHOUT STATUS MIGRAINOSUS, NOT INTRACTABLE: Primary | ICD-10-CM

## 2023-05-08 PROCEDURE — 99212 PR OFFICE/OUTPT VISIT, EST, LEVL II, 10-19 MIN: ICD-10-PCS | Mod: S$GLB,,, | Performed by: OPTOMETRIST

## 2023-05-08 PROCEDURE — 1101F PT FALLS ASSESS-DOCD LE1/YR: CPT | Mod: CPTII,S$GLB,, | Performed by: OPTOMETRIST

## 2023-05-08 PROCEDURE — 1157F ADVNC CARE PLAN IN RCRD: CPT | Mod: CPTII,S$GLB,, | Performed by: OPTOMETRIST

## 2023-05-08 PROCEDURE — 0124A COVID-19, MRNA, LNP-S, BIVALENT BOOSTER, PF, 30 MCG/0.3 ML DOSE: CPT | Mod: CV19,PBBFAC | Performed by: INTERNAL MEDICINE

## 2023-05-08 PROCEDURE — 1101F PR PT FALLS ASSESS DOC 0-1 FALLS W/OUT INJ PAST YR: ICD-10-PCS | Mod: CPTII,S$GLB,, | Performed by: OPTOMETRIST

## 2023-05-08 PROCEDURE — 99999 PR PBB SHADOW E&M-EST. PATIENT-LVL I: ICD-10-PCS | Mod: PBBFAC,,, | Performed by: OPTOMETRIST

## 2023-05-08 PROCEDURE — 99999 PR PBB SHADOW E&M-EST. PATIENT-LVL I: CPT | Mod: PBBFAC,,, | Performed by: OPTOMETRIST

## 2023-05-08 PROCEDURE — 91312 COVID-19, MRNA, LNP-S, BIVALENT BOOSTER, PF, 30 MCG/0.3 ML DOSE: CPT | Mod: S$GLB,,, | Performed by: INTERNAL MEDICINE

## 2023-05-08 PROCEDURE — 64615 CHEMODENERV MUSC MIGRAINE: CPT | Mod: S$GLB,,, | Performed by: NURSE PRACTITIONER

## 2023-05-08 PROCEDURE — 1157F PR ADVANCE CARE PLAN OR EQUIV PRESENT IN MEDICAL RECORD: ICD-10-PCS | Mod: CPTII,S$GLB,, | Performed by: OPTOMETRIST

## 2023-05-08 PROCEDURE — 3288F PR FALLS RISK ASSESSMENT DOCUMENTED: ICD-10-PCS | Mod: CPTII,S$GLB,, | Performed by: OPTOMETRIST

## 2023-05-08 PROCEDURE — 99212 OFFICE O/P EST SF 10 MIN: CPT | Mod: S$GLB,,, | Performed by: OPTOMETRIST

## 2023-05-08 PROCEDURE — 99499 NO LOS: ICD-10-PCS | Mod: S$GLB,,, | Performed by: NURSE PRACTITIONER

## 2023-05-08 PROCEDURE — 3288F FALL RISK ASSESSMENT DOCD: CPT | Mod: CPTII,S$GLB,, | Performed by: OPTOMETRIST

## 2023-05-08 PROCEDURE — 1126F PR PAIN SEVERITY QUANTIFIED, NO PAIN PRESENT: ICD-10-PCS | Mod: CPTII,S$GLB,, | Performed by: OPTOMETRIST

## 2023-05-08 PROCEDURE — 91312 COVID-19, MRNA, LNP-S, BIVALENT BOOSTER, PF, 30 MCG/0.3 ML DOSE: ICD-10-PCS | Mod: S$GLB,,, | Performed by: INTERNAL MEDICINE

## 2023-05-08 PROCEDURE — 64615 PR CHEMODENERVATION OF MUSCLE FOR CHRONIC MIGRAINE: ICD-10-PCS | Mod: S$GLB,,, | Performed by: NURSE PRACTITIONER

## 2023-05-08 PROCEDURE — 1126F AMNT PAIN NOTED NONE PRSNT: CPT | Mod: CPTII,S$GLB,, | Performed by: OPTOMETRIST

## 2023-05-08 PROCEDURE — 99499 UNLISTED E&M SERVICE: CPT | Mod: S$GLB,,, | Performed by: NURSE PRACTITIONER

## 2023-05-08 RX ORDER — PREGABALIN 75 MG/1
CAPSULE ORAL
Qty: 60 CAPSULE | Refills: 0 | Status: SHIPPED | OUTPATIENT
Start: 2023-05-08 | End: 2023-05-09

## 2023-05-08 RX ORDER — PREGABALIN 25 MG/1
CAPSULE ORAL
Qty: 90 CAPSULE | Refills: 0 | Status: SHIPPED | OUTPATIENT
Start: 2023-05-08 | End: 2023-05-09

## 2023-05-08 NOTE — PROGRESS NOTES
HPI    DSL- 4/14/2023 Dr. Ocampo    72 y/o male present to clinic for dry eyes syndrome f/u. Pt states he   tried Tyrvaya intermitted for 7 days before he d/c due to sleep apnea. Pt   is using Thera tears, with no noticing improvement. No ocular pain   reported but reports aching headaches corner of eyes.   Last edited by Allie Avery on 5/8/2023  2:11 PM.            Assessment /Plan     For exam results, see Encounter Report.    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

## 2023-05-08 NOTE — PROCEDURES
SUBJECTIVE:  Patient ID: Raffy Rutherford Jr.  Chief Complaint: Botulinum Toxin Injection and Follow-up    History of Present Illness:  Raffy Rutherford Jr. is a 73 y.o. male who presents to clinic alone for follow-up of headaches and Botox injections.     Recommendations made at last Office Visit on 2/13/23:  - Botox administered in clinic for Chronic Migraine (see below)   - D/C Ajovy   - Increase qulipta to 60 mg daily   - Limit use of fioricet to no more than 6-8 days per month to avoid rebound/medication overuse headache   - Continue lamotrigine 200 mg and trazodone 50 mg daily as prescribed by PCP for depression/anxiety, likely with dual benefit on headaches   - Chronic LBP - continue lyrica as directed by outside pain management Dr. Olivarez, on chronic narcotics which are likely contributing to the refractive nature of his migraines/headaches   - Could consider functional restoration program in the future   - RTC in 12 weeks for repeat Botox injections or sooner if needed     05/08/2023- Interval History:  Has seen a noticeable improvement in his migraines since starting Botox injections, was down to 1-2 migraines per week, had a few weeks with no migraines.  Migraines were easily managed with over the counter medications.  Began to notice an uptick in migraine frequency 3 weeks ago, sent message via patient portal, was restarted on ajovy 225 mg SQ monthly.  Feels migraines have leveled out since restarting ajovy.  Denies presence of side effects to Botox, is pleased with the results from first round of Botox, does wish to proceed with second round of Botox as scheduled today.     Otherwise, information below is still accurate and current.     02/13/2023- Interval History:  Headaches continue to occur on a near daily basis with full blown migraines on 12-15 days per month.  He started Ajovy and recently injected third injection, unsure whether or not ajovy was helping.  Seen by outside pain management provider  who recommended and prescribed qulipta 10 mg daily, dose increased to 30 mg daily.  For a few days after increasing dose to 30 mg, began to experience issues with insomnia, unsure if this was related to qulipta vs ajovy or combination, but he does feel migraines were better after increasing dose to 30 mg.    Risks, Benefits, and potential side effects of Botox discussed with patient.  Alternative treatments offered.  After thorough discussed regarding the procedure, patient has decided to move forward with initiating Botox injections for Chronic Migraine.  Patient understands we will complete 3 rounds of injections, if after 3 rounds, we do not see a 50% reduction in headaches, we will discontinue Botox injections.      Otherwise, information below is still accurate and current.      History of Present Illness:   72 y.o. male with headaches, chronic LBP, cervical radiculopathy, anxiety, depression, WINNIE, hx of prostate cancer, who presents for evaluation of headaches via virtual visit.  Patient is established with neurology clinic, previous patient of DESTINY Norman and Dr. Millan, he is a new patient to me.    Currently suffering with headaches at least 1-2 times per week each of which last 2-4 days in duration.  Long history of headahces, beginning around 18 yo, had multiple sinus surgeries thinking headaches were sinus related, no improvement.       Headaches are described as a moderate to severe, stabbing/searing pain beginning left retro-orbitalis, extending towards the occipitalis and into his shoulder/left arm/hand.  Headaches can last anywhere from an hour when successfully treated with fioricet or excedrin, but can last up to 4 days in duration, on average headaches are lasting about a day in duration.  Headaches are often present upon waking.   Associated symptoms include fatigue, crepitus in left shoulder, congested (on left side), more trouble concentrating than norm, photophobia, phonophobia.  Currently  experiencing some sort of headache on 20-25/30 days, with migraines occurring on 12-15/30 days.    At last visit with DESTINY Norman, she restarted nurtec 75 mg odt and referred him to me for consideration of Botox. Nurtec every other day was ineffective (though was effective in the past for him).  He was then switched to ajovy 225 mg SQ monthly, was waiting for today's visit to decide whether or not to start using ajovy.   Takes lyrica as prescribed by outside pain management, for chronic back pain.   He is prescribed lamotrigine 200 mg daily for anxiety/depression.  Takes trazodone 50 mg nightly for insomnia.      Notes from DESTINY Norman:  HPI:   Mr. Raffy Rutherford Jr. is a 72 y.o. male presenting for evaluation regarding chronic low back pain. On chart review he was previously seen by Dr. Millan for both bilateral neuropathy and headache pain. He received an EMG with Dr. Millan on 03/15/2022 which showed evidence of L5/S1 radiculopahties, worse on the right; and moderate chronic reinnervation changes noted in the R vastus medialis. His current therapy regimen is Lyrica 200mg QHS. He notes he was previously seen by neurosurgery and instructed to receive an MRI lumbar spine by Dr. Caldwell but was unsure why this was ordered. We discussed that this is likely due to the evidence on the EMG indicating his bilateral foot drop may be due to a lumbar spine issue at L5/S1 level.      Headache History:   Onset- Teenager   Frequency- 5-6 per week  Duration- All day   Location- L eye and radiates to his L jaw and then toward his L shoulder and arm  Associated symptoms- Nasal blockage on the L side, + photophobia,   Denies any aura, denies nausea/vomiting   Alleviating Factors- Half Fioricet can occasionally relieve his headaches  Aggravating Factors- Smells trigger his headaches and stress/ poor sleep, weather      Of note- patient went to [place] and found in the 3 weeks he was there he only had 2-3 headaches which were easily relieved     He  states that he is also concerned regarding his headaches for which he is currently taking Ubrelvy QOD and Fioricet for abortive therapy.       Headache:  Type: iron hot stab like pain   Severity: 9/10  Location: left eye, forehead , cheek into left shoulder and into the back   Triggers: Particular scents, looking at screen for a long time   Frequency: worse in the last week, but otherwise 8-9/month   Duration:  Days if he does not take medication (2-3 days) can have a cycles of headaches for several weeks , sometimes can go 4 weeks without headache   Aura: none   Photophobia: not as much but does endorse turning the lights off or worsening of headache when he looks at his phone  Phonophobia: ++  Nausea/ vomiting: rarely gets nauseous   Aggravating factors: none   Relieving factors: Medications  Inhales an oil- mydab which has given him significant benefit  Fioricet- none   Ubrelvy- worked okay but Nurtec works better  Voltaren- he takes this for joint pains.   Excedrin- none   Aspirin makes tinnitus worse   Norco- 3 times a day - 1/2 pill Norco in am , 1/2 pill in the afternoon 1/2 + 3/4th pill in the evening. Pain worsens when he tries to taper off his pain medication  He snores at night, he has sleep apnea, he was on CPAP but not very compliant because he states he took the mask off in his sleep. He has tried different masks with no benefit.      Treatments Tried and Response  Nurtec  lyrica  Atogepant  Baclofen   Fioricet  Celebrex  Emgality 09/08/2020 failed  Aimovig  Fioricet   Diamox   Cymbalta   Klonopin   Valium   [place]  Lamictal   Gabapentin   Indometacin   Dilaudid  Verapamil  Nurtec   Ubrelvy   Trazodone  Ajovy   Botox - helping after first round  Qulipta 60 mg - no, caused insomnia   Ajovy - helping     Current Medications:    atorvastatin (LIPITOR) 40 MG tablet, Take 1 tablet (40 mg total) by mouth once daily., Disp: 90 tablet, Rfl: 3    butalbital-acetaminophen-caffeine -40 mg (FIORICET, ESGIC)  -40 mg per tablet, Take 1 tablet by mouth daily as needed for 30 days., Disp: 30 tablet, Rfl: 0    clonazePAM (KLONOPIN) 0.5 MG tablet, Take 1 tablet by mouth twice a day as needed for anxiety, Disp: 60 tablet, Rfl: 5    ergocalciferol (VITAMIN D2) 50,000 unit Cap, Take 1 capsule (50,000 Units total) by mouth every 7 days., Disp: 12 capsule, Rfl: 3    fremanezumab-vfrm (AJOVY SYRINGE) 225 mg/1.5 mL injection, Inject 1.5 mLs (225 mg total) into the skin every 28 days., Disp: 1.5 mL, Rfl: 5    lamoTRIgine (LAMICTAL) 200 MG tablet, Take 1 tablet (200 mg total) by mouth once daily., Disp: 90 tablet, Rfl: 3    pregabalin (LYRICA) 25 MG capsule, Take 2 capsules at night and take 1 capsule in the evening., Disp: 90 capsule, Rfl: 0    pregabalin (LYRICA) 75 MG capsule, Take 2 capsules by mouth at night., Disp: 60 capsule, Rfl: 0    RABEprazole (ACIPHEX) 20 mg tablet, Take 1 tablet (20 mg total) by mouth 2 (two) times daily., Disp: 180 tablet, Rfl: 3    selegiline (EMSAM) 9 mg/24 hr, Place 1 patch onto the skin once daily., Disp: 30 patch, Rfl: 11    sildenafiL (VIAGRA) 100 MG tablet, Take 1 tablet (100 mg total) by mouth as needed for Erectile Dysfunction (take 30-60 minutes before on empty stomach). Take one hour before., Disp: 4 tablet, Rfl: 12    tadalafiL (CIALIS) 5 MG tablet, Take 1 tablet (5 mg total) by mouth daily as needed for Erectile Dysfunction., Disp: 120 tablet, Rfl: 3    terazosin (HYTRIN) 2 MG capsule, Take 1 capsule (2 mg total) by mouth every evening., Disp: 30 capsule, Rfl: 11    traZODone (DESYREL) 50 MG tablet, Take 1 tablet (50 mg total) by mouth every evening., Disp: 90 tablet, Rfl: 3    varenicline (TYRVAYA) 0.03 mg/spray sprm, 1 spray by Each Nostril route 2 (two) times a day., Disp: 8.4 mL, Rfl: 11    ubrogepant (UBRELVY) 100 mg tablet, Take 1 tablet (100 mg total) by mouth every 2 (two) hours as needed for Migraine. Max 200 mg/day., Disp: 16 tablet, Rfl: 2    Current Facility-Administered  Medications:     onabotulinumtoxina injection 200 Units, 200 Units, Intramuscular, q12 weeks, Macie Pearson, FNP, 200 Units at 05/08/23 1348    Review of Systems - A review of 10+ systems was conducted with pertinent positive and negative findings documented in HPI with all other systems reviewed and negative.    PFSH: Past medical, family, and social history reviewed as documented in chart with pertinent positive medical, family, and social history detailed in HPI.    OBJECTIVE:  Vitals:  /64   Pulse 74   Wt 80.6 kg (177 lb 9.3 oz)   BMI 27.00 kg/m²     Physical Exam:  Constitutional: he appears well-developed and well-nourished. he is well groomed. NAD     Review of Data:   Notes from Dr. Davidson, ENT reviewed.   Labs:  Lab Visit on 04/24/2023   Component Date Value Ref Range Status    PSA Diagnostic 04/24/2023 10.1 (H)  0.00 - 4.00 ng/mL Final   Lab Visit on 02/06/2023   Component Date Value Ref Range Status    NMO Interpretive Comments 02/06/2023 SEE BELOW   Final    PAVAL ANAT-1, Serum 02/06/2023 Negative  Negative Final    PAVAL ANAT-2, Serum 02/06/2023 Negative  Negative Final    PAVAL ANAT-3, Serum 02/06/2023 Negative  Negative Final    PAVAL AGNA-1, Serum 02/06/2023 Negative  Negative Final    PAVAL, PCA-1, Serum 02/06/2023 Negative  Negative Final    Purkinje Cell Cytoplasmic Ab, Type* 02/06/2023 Negative  Negative Final    PAVAL, PCA-Tr, Serum 02/06/2023 Negative  Negative Final    PAVAL,  Amphiphysin Ab, Serum 02/06/2023 Negative  Negative Final    CRMP-5 IgG 02/06/2023 Negative  Negative Final    P/Q Type Calcium Channel Ab 02/06/2023 0.04 (H)  <=0.02 nmol/L Final    Neuronal (V-G) K+ Channel Ab, Serum 02/06/2023 0.00  <=0.02 nmol/L Final    Sensory Neuropathy Profile 02/06/2023 See result image under hyperlink   Final   Lab Visit on 01/30/2023   Component Date Value Ref Range Status    PSA Diagnostic 01/30/2023 10.2 (H)  0.00 - 4.00 ng/mL Final   Lab Visit on 01/10/2023   Component Date  Value Ref Range Status    Cholesterol 01/10/2023 189  120 - 199 mg/dL Final    Triglycerides 01/10/2023 118  30 - 150 mg/dL Final    HDL 01/10/2023 62  40 - 75 mg/dL Final    LDL Cholesterol 01/10/2023 103.4  63.0 - 159.0 mg/dL Final    HDL/Cholesterol Ratio 01/10/2023 32.8  20.0 - 50.0 % Final    Total Cholesterol/HDL Ratio 01/10/2023 3.0  2.0 - 5.0 Final    Non-HDL Cholesterol 01/10/2023 127  mg/dL Final    Sodium 01/10/2023 140  136 - 145 mmol/L Final    Potassium 01/10/2023 4.5  3.5 - 5.1 mmol/L Final    Chloride 01/10/2023 103  95 - 110 mmol/L Final    CO2 01/10/2023 30 (H)  23 - 29 mmol/L Final    Glucose 01/10/2023 99  70 - 110 mg/dL Final    BUN 01/10/2023 15  8 - 23 mg/dL Final    Creatinine 01/10/2023 0.8  0.5 - 1.4 mg/dL Final    Calcium 01/10/2023 9.8  8.7 - 10.5 mg/dL Final    Total Protein 01/10/2023 7.5  6.0 - 8.4 g/dL Final    Albumin 01/10/2023 4.3  3.5 - 5.2 g/dL Final    Total Bilirubin 01/10/2023 0.7  0.1 - 1.0 mg/dL Final    Alkaline Phosphatase 01/10/2023 37 (L)  55 - 135 U/L Final    AST 01/10/2023 22  10 - 40 U/L Final    ALT 01/10/2023 22  10 - 44 U/L Final    Anion Gap 01/10/2023 7 (L)  8 - 16 mmol/L Final    eGFR 01/10/2023 >60.0  >60 mL/min/1.73 m^2 Final   Office Visit on 12/14/2022   Component Date Value Ref Range Status    POC Residual Urine Volume 12/14/2022 6  0 - 100 mL Final    Color, UA 12/14/2022 Yellow   Final    pH, UA 12/14/2022 8   Final    WBC, UA 12/14/2022 neg   Final    Nitrite, UA 12/14/2022 neg   Final    Protein, POC 12/14/2022 neg   Final    Glucose, UA 12/14/2022 neg   Final    Ketones, UA 12/14/2022 neg   Final    Urobilinogen, UA 12/14/2022 neg   Final    Bilirubin, POC 12/14/2022 neg   Final    Blood, UA 12/14/2022 neg   Final    Clarity, UA 12/14/2022 Clear   Final     Imaging:  No results found. However, due to the size of the patient record, not all encounters were searched. Please check Results Review for a complete set of results.    Note: I have  independently reviewed any/all imaging/labs/tests and agree with the report (s) as documented.  Any discrepancies will be as noted/demarcated by free text.  GARO BERGER 5/8/2023    ASSESSMENT:  1. Chronic migraine without aura without status migrainosus, not intractable    2. Migraine without status migrainosus, not intractable, unspecified migraine type      PLAN:  - Botox administered in clinic for Chronic Migraine (see below)   - For migraine prevention - continue ajovy 225 mg SQ monthly   - For acute migraine - will retry ubrelvy 100 mg   - Triptans contraindicated given daily MAOI transdermal patch   - For rescue - has fioricet available  - RTC in 12 weeks for repeat Botox injections or sooner if needed     Orders Placed This Encounter    ubrogepant (UBRELVY) 100 mg tablet       All questions and concerns addressed.  Patient verbalizes understanding and is agreeable with the above stated treatment plan.      PROCEDURE NOTE:  BOTOX was performed as an indicated therapy for intractable chronic migraine headaches given that the patient failed more than 2 headache medications    A time out was conducted just before the start of the procedure to verify the correct patient and procedure, procedure location, and all relevant critical information.     The Botox injections have achieved well over 50%  improvement in the patient's symptoms. Migraines have been reduced at least 7 days per month and the number of cumulative hours suffering with headaches has been reduced at least 100 total hours per month. Today she does have a headache indicating that the Botox has worn off. Frequency of treatment is every 12 weeks unless no response to the treatments, at which time we will discontinue the injections.    PROCEDURE PERFORMED: Botulinum toxin injection (64986)  CLINICAL INDICATION: G43.719  After risks and benefits were explained including bleeding, infection, worsening of pain, damage to the areas being injected, weakness  of muscles, loss of muscle control, dysphagia if injecting the head or neck, facial droop if injecting the facial area, painful injection, allergic or other reaction to the medications being injected, and the failure of the procedure to help the problem, a signed consent was obtained.   The patient was placed in a comfortable area and the sites to be treated were identified.The area to be treated was prepped three times with alcohol and the alcohol allowed to dry. Next, a 30 gauge needle was used to inject the medication in the area to be treated.      Total Botox used: 155 Units   Unavoidable waste: 45 Units     Injection sites:    muscle bilaterally ( a total of 10 units divided into 2 sites)   Procerus muscle (5 units)   Frontalis muscle bilaterally (a total of 20 units divided into 4 sites)   Temporalis muscle bilaterally (a total of 40 units divided into 8 sites)   Occipitalis muscle bilaterally (a total of 30 units divided into 6 sites)   Cervical paraspinal muscles (a total of 20 units divided into 4 sites)   Trapezius muscle bilaterally (a total of 30 units divided into 6 sites)   Complications: none   RTC for the next Botox injection: 12 weeks     CC: MD Macie Edmondson, FNP-C [facility]   [phone]

## 2023-05-09 ENCOUNTER — PATIENT MESSAGE (OUTPATIENT)
Dept: INTERNAL MEDICINE | Facility: CLINIC | Age: 73
End: 2023-05-09
Payer: COMMERCIAL

## 2023-05-09 RX ORDER — PREGABALIN 75 MG/1
CAPSULE ORAL
Qty: 60 CAPSULE | Refills: 1 | Status: SHIPPED | OUTPATIENT
Start: 2023-05-09 | End: 2023-06-13 | Stop reason: SDUPTHER

## 2023-05-09 RX ORDER — PREGABALIN 25 MG/1
CAPSULE ORAL
Qty: 90 CAPSULE | Refills: 1 | Status: SHIPPED | OUTPATIENT
Start: 2023-05-09 | End: 2023-08-09 | Stop reason: SDUPTHER

## 2023-05-09 NOTE — TELEPHONE ENCOUNTER
No care due was identified.  [facility] Embedded Care Due Messages. Reference number: [id number].   5/09/2023 3:10:25 PM CDT

## 2023-05-10 ENCOUNTER — TELEPHONE (OUTPATIENT)
Dept: PHARMACY | Facility: CLINIC | Age: 73
End: 2023-05-10
Payer: COMMERCIAL

## 2023-05-11 ENCOUNTER — PATIENT MESSAGE (OUTPATIENT)
Dept: ORTHOPEDICS | Facility: CLINIC | Age: 73
End: 2023-05-11
Payer: COMMERCIAL

## 2023-05-15 ENCOUNTER — PATIENT MESSAGE (OUTPATIENT)
Dept: NEUROLOGY | Facility: CLINIC | Age: 73
End: 2023-05-15
Payer: COMMERCIAL

## 2023-05-19 ENCOUNTER — TELEPHONE (OUTPATIENT)
Dept: ORTHOPEDICS | Facility: HOSPITAL | Age: 73
End: 2023-05-19
Payer: COMMERCIAL

## 2023-05-23 ENCOUNTER — PATIENT MESSAGE (OUTPATIENT)
Dept: ORTHOPEDICS | Facility: CLINIC | Age: 73
End: 2023-05-23
Payer: COMMERCIAL

## 2023-05-30 ENCOUNTER — PATIENT MESSAGE (OUTPATIENT)
Dept: ORTHOPEDICS | Facility: CLINIC | Age: 73
End: 2023-05-30
Payer: COMMERCIAL

## 2023-05-30 DIAGNOSIS — G89.29 CHRONIC LEFT SHOULDER PAIN: Primary | ICD-10-CM

## 2023-05-30 DIAGNOSIS — M25.512 CHRONIC LEFT SHOULDER PAIN: Primary | ICD-10-CM

## 2023-05-30 NOTE — PROGRESS NOTES
Left shoulder pain for 3 months that has failed conservative treatment including rest, steroid injection and PT.  Recommend MRI left shoulder to evaluate rotator cuff

## 2023-06-01 ENCOUNTER — PATIENT MESSAGE (OUTPATIENT)
Dept: NEUROLOGY | Facility: CLINIC | Age: 73
End: 2023-06-01
Payer: COMMERCIAL

## 2023-06-04 DIAGNOSIS — N52.9 ED (ERECTILE DYSFUNCTION) OF ORGANIC ORIGIN: ICD-10-CM

## 2023-06-04 DIAGNOSIS — N40.1 BPH WITH URINARY OBSTRUCTION: ICD-10-CM

## 2023-06-04 DIAGNOSIS — N13.8 BPH WITH URINARY OBSTRUCTION: ICD-10-CM

## 2023-06-05 ENCOUNTER — PATIENT MESSAGE (OUTPATIENT)
Dept: ORTHOPEDICS | Facility: CLINIC | Age: 73
End: 2023-06-05
Payer: COMMERCIAL

## 2023-06-05 ENCOUNTER — TELEPHONE (OUTPATIENT)
Dept: ORTHOPEDICS | Facility: CLINIC | Age: 73
End: 2023-06-05
Payer: COMMERCIAL

## 2023-06-05 ENCOUNTER — HOSPITAL ENCOUNTER (OUTPATIENT)
Dept: RADIOLOGY | Facility: HOSPITAL | Age: 73
Discharge: HOME | End: 2023-06-05
Attending: PHYSICIAN ASSISTANT
Payer: COMMERCIAL

## 2023-06-05 ENCOUNTER — PATIENT MESSAGE (OUTPATIENT)
Dept: UROLOGY | Facility: CLINIC | Age: 73
End: 2023-06-05
Payer: COMMERCIAL

## 2023-06-05 DIAGNOSIS — N13.8 BPH WITH URINARY OBSTRUCTION: ICD-10-CM

## 2023-06-05 DIAGNOSIS — G62.9 PERIPHERAL POLYNEUROPATHY: Primary | ICD-10-CM

## 2023-06-05 DIAGNOSIS — M25.512 CHRONIC LEFT SHOULDER PAIN: ICD-10-CM

## 2023-06-05 DIAGNOSIS — C61 PROSTATE CANCER: Primary | ICD-10-CM

## 2023-06-05 DIAGNOSIS — N40.1 BPH WITH URINARY OBSTRUCTION: ICD-10-CM

## 2023-06-05 DIAGNOSIS — N52.9 ED (ERECTILE DYSFUNCTION) OF ORGANIC ORIGIN: ICD-10-CM

## 2023-06-05 DIAGNOSIS — G89.29 CHRONIC LEFT SHOULDER PAIN: ICD-10-CM

## 2023-06-05 PROCEDURE — 73221 MRI JOINT UPR EXTREM W/O DYE: CPT | Mod: 26,LT,, | Performed by: RADIOLOGY

## 2023-06-05 PROCEDURE — 73221 MRI JOINT UPR EXTREM W/O DYE: CPT | Mod: TC,LT

## 2023-06-05 PROCEDURE — 73221 MRI SHOULDER WITHOUT CONTRAST LEFT: ICD-10-PCS | Mod: 26,LT,, | Performed by: RADIOLOGY

## 2023-06-05 RX ORDER — TADALAFIL 10 MG/1
10 TABLET ORAL DAILY PRN
Qty: 30 TABLET | Refills: 11 | Status: SHIPPED | OUTPATIENT
Start: 2023-06-05 | End: 2024-06-04

## 2023-06-05 RX ORDER — SILDENAFIL 100 MG/1
100 TABLET, FILM COATED ORAL
Qty: 4 TABLET | Refills: 12 | Status: SHIPPED | OUTPATIENT
Start: 2023-06-05

## 2023-06-07 ENCOUNTER — PATIENT MESSAGE (OUTPATIENT)
Dept: ORTHOPEDICS | Facility: CLINIC | Age: 73
End: 2023-06-07
Payer: COMMERCIAL

## 2023-06-12 ENCOUNTER — OFFICE VISIT (OUTPATIENT)
Dept: NEUROLOGY | Facility: CLINIC | Age: 73
End: 2023-06-12
Payer: COMMERCIAL

## 2023-06-12 ENCOUNTER — LAB VISIT (OUTPATIENT)
Dept: LAB | Facility: HOSPITAL | Age: 73
End: 2023-06-12
Attending: PSYCHIATRY & NEUROLOGY
Payer: COMMERCIAL

## 2023-06-12 ENCOUNTER — TELEPHONE (OUTPATIENT)
Dept: SPORTS MEDICINE | Facility: CLINIC | Age: 73
End: 2023-06-12
Payer: COMMERCIAL

## 2023-06-12 VITALS
WEIGHT: 177.56 LBS | BODY MASS INDEX: 26.91 KG/M2 | HEIGHT: 68 IN | SYSTOLIC BLOOD PRESSURE: 102 MMHG | DIASTOLIC BLOOD PRESSURE: 66 MMHG | HEART RATE: 58 BPM

## 2023-06-12 DIAGNOSIS — G62.89 AXONAL SENSORIMOTOR NEUROPATHY: ICD-10-CM

## 2023-06-12 DIAGNOSIS — M51.16 LUMBAR DISC HERNIATION WITH RADICULOPATHY: ICD-10-CM

## 2023-06-12 DIAGNOSIS — G62.89 AXONAL SENSORIMOTOR NEUROPATHY: Primary | ICD-10-CM

## 2023-06-12 DIAGNOSIS — G62.9 NEUROPATHY: ICD-10-CM

## 2023-06-12 LAB — RHEUMATOID FACT SERPL-ACNC: <13 IU/ML (ref 0–15)

## 2023-06-12 PROCEDURE — 1159F MED LIST DOCD IN RCRD: CPT | Mod: CPTII,S$GLB,, | Performed by: PSYCHIATRY & NEUROLOGY

## 2023-06-12 PROCEDURE — 99215 PR OFFICE/OUTPT VISIT, EST, LEVL V, 40-54 MIN: ICD-10-PCS | Mod: S$GLB,,, | Performed by: PSYCHIATRY & NEUROLOGY

## 2023-06-12 PROCEDURE — 99999 PR PBB SHADOW E&M-EST. PATIENT-LVL IV: CPT | Mod: PBBFAC,,, | Performed by: PSYCHIATRY & NEUROLOGY

## 2023-06-12 PROCEDURE — 3078F DIAST BP <80 MM HG: CPT | Mod: CPTII,S$GLB,, | Performed by: PSYCHIATRY & NEUROLOGY

## 2023-06-12 PROCEDURE — 99417 PROLNG OP E/M EACH 15 MIN: CPT | Mod: S$GLB,,, | Performed by: PSYCHIATRY & NEUROLOGY

## 2023-06-12 PROCEDURE — 3078F PR MOST RECENT DIASTOLIC BLOOD PRESSURE < 80 MM HG: ICD-10-PCS | Mod: CPTII,S$GLB,, | Performed by: PSYCHIATRY & NEUROLOGY

## 2023-06-12 PROCEDURE — 3074F SYST BP LT 130 MM HG: CPT | Mod: CPTII,S$GLB,, | Performed by: PSYCHIATRY & NEUROLOGY

## 2023-06-12 PROCEDURE — 36415 COLL VENOUS BLD VENIPUNCTURE: CPT | Performed by: PSYCHIATRY & NEUROLOGY

## 2023-06-12 PROCEDURE — 99417 PR PROLONGED SVC, OUTPT, W/WO DIRECT PT CONTACT,  EA ADDTL 15 MIN: ICD-10-PCS | Mod: S$GLB,,, | Performed by: PSYCHIATRY & NEUROLOGY

## 2023-06-12 PROCEDURE — 1101F PT FALLS ASSESS-DOCD LE1/YR: CPT | Mod: CPTII,S$GLB,, | Performed by: PSYCHIATRY & NEUROLOGY

## 2023-06-12 PROCEDURE — 86036 ANCA SCREEN EACH ANTIBODY: CPT | Performed by: PSYCHIATRY & NEUROLOGY

## 2023-06-12 PROCEDURE — 3074F PR MOST RECENT SYSTOLIC BLOOD PRESSURE < 130 MM HG: ICD-10-PCS | Mod: CPTII,S$GLB,, | Performed by: PSYCHIATRY & NEUROLOGY

## 2023-06-12 PROCEDURE — 86038 ANTINUCLEAR ANTIBODIES: CPT | Performed by: PSYCHIATRY & NEUROLOGY

## 2023-06-12 PROCEDURE — 86431 RHEUMATOID FACTOR QUANT: CPT | Performed by: PSYCHIATRY & NEUROLOGY

## 2023-06-12 PROCEDURE — 1157F PR ADVANCE CARE PLAN OR EQUIV PRESENT IN MEDICAL RECORD: ICD-10-PCS | Mod: CPTII,S$GLB,, | Performed by: PSYCHIATRY & NEUROLOGY

## 2023-06-12 PROCEDURE — 99999 PR PBB SHADOW E&M-EST. PATIENT-LVL IV: ICD-10-PCS | Mod: PBBFAC,,, | Performed by: PSYCHIATRY & NEUROLOGY

## 2023-06-12 PROCEDURE — 1157F ADVNC CARE PLAN IN RCRD: CPT | Mod: CPTII,S$GLB,, | Performed by: PSYCHIATRY & NEUROLOGY

## 2023-06-12 PROCEDURE — 3288F FALL RISK ASSESSMENT DOCD: CPT | Mod: CPTII,S$GLB,, | Performed by: PSYCHIATRY & NEUROLOGY

## 2023-06-12 PROCEDURE — 3288F PR FALLS RISK ASSESSMENT DOCUMENTED: ICD-10-PCS | Mod: CPTII,S$GLB,, | Performed by: PSYCHIATRY & NEUROLOGY

## 2023-06-12 PROCEDURE — 1101F PR PT FALLS ASSESS DOC 0-1 FALLS W/OUT INJ PAST YR: ICD-10-PCS | Mod: CPTII,S$GLB,, | Performed by: PSYCHIATRY & NEUROLOGY

## 2023-06-12 PROCEDURE — 99215 OFFICE O/P EST HI 40 MIN: CPT | Mod: S$GLB,,, | Performed by: PSYCHIATRY & NEUROLOGY

## 2023-06-12 PROCEDURE — 1125F AMNT PAIN NOTED PAIN PRSNT: CPT | Mod: CPTII,S$GLB,, | Performed by: PSYCHIATRY & NEUROLOGY

## 2023-06-12 PROCEDURE — 1125F PR PAIN SEVERITY QUANTIFIED, PAIN PRESENT: ICD-10-PCS | Mod: CPTII,S$GLB,, | Performed by: PSYCHIATRY & NEUROLOGY

## 2023-06-12 PROCEDURE — 3008F PR BODY MASS INDEX (BMI) DOCUMENTED: ICD-10-PCS | Mod: CPTII,S$GLB,, | Performed by: PSYCHIATRY & NEUROLOGY

## 2023-06-12 PROCEDURE — 1159F PR MEDICATION LIST DOCUMENTED IN MEDICAL RECORD: ICD-10-PCS | Mod: CPTII,S$GLB,, | Performed by: PSYCHIATRY & NEUROLOGY

## 2023-06-12 PROCEDURE — 3008F BODY MASS INDEX DOCD: CPT | Mod: CPTII,S$GLB,, | Performed by: PSYCHIATRY & NEUROLOGY

## 2023-06-12 PROCEDURE — 82300 ASSAY OF CADMIUM: CPT | Performed by: PSYCHIATRY & NEUROLOGY

## 2023-06-12 NOTE — PROGRESS NOTES
[facility] - NEUROLOGY 7TH FL [facility], [place]    Date: 6/12/23  Patient Name: Raffy Rutherford Jr.   MRN: [id number]   Referring Provider: No ref. provider found    Thank you so much No ref. provider found for your patient referral to Neuromuscular team at [facility]. We take pride in our care coordination and look forward to your feedback and questions.    Assessment:   Raffy Rutherford Jr. is a 73 y.o. male is a very pleasant patient  with progressive sensorimotor polyneuropathy with negative workup including genetic testing with VUS to establish care at neuromuscular Clinic at [facility].  I reviewed his chart in detail and reconfirmed history and examination.  Due to his symptoms started after the age of 62 and positive symptoms of burning usually consistent with acquired polyneuropathy, I would like to do further workup including heavy metal screening which has not been done in the past.  CSF analysis can be considered in future for further evaluation.      I agree because of his remarkable distal lower extremity atrophy there was initially suspicion for a genetic polyneuropathy.  I may repeat NCS/EMG followed by genetic testing with more extensive panel for genetic neuropathy after discussion with the patient.    I discussed about fall precautions and need for Physiotherapy. I addressed his complaints. I provided information about fall precautions and healthy lifestyle.    I would wish him/her very best for improvement/recovery in his condition.    Future direction based on feedback:    Plan:     Problem List Items Addressed This Visit          Neuro    Lumbar disc herniation with radiculopathy     Other Visit Diagnoses       Axonal sensorimotor neuropathy    -  Primary    Relevant Orders    HEAVY METALS SCREEN, BLOOD (QUANTITATIVE) (Completed)    ROBERTO by IFA, w/Rflx (Completed)    ANTI-NEUTROPHILIC CYTOPLASMIC ANTIBODY (Completed)    Rheumatoid Factor  (Completed)    Neuropathy                Uri Patel MD    This evaluation was completed in >100  Minutes over 50% of the time spent on education & counseling. This time includes pre-charting/chart review including labs and imaging, face to face encounter followed by documentation/note writing.    Patient note was created using MModal Dictation.  Any errors in syntax or even information may not have been identified and edited on initial review prior to signing this note.    Details provided by:    Patient  Family- Valarie (Wife)      History of Present Illness:    Chief complaint today:    Mr. Raffy Rutherford Jr. is a 73 y.o. male with PMH of HLD, migraine, fibromyalgia, s/p bilateral CTS surgery, chronic low back pain due to degenerative disc disease s/p fusion,  prostate cancer, depression, GERD, WINNIE, and severe motor neuropathy with inconclusive genetic testing for CMT.  The details were reconfirmed with the patient.  According to him, brothers and sisters have joint problem.  Brother has drop foot early in age in the setting of diabetes mellitus.  Patient never received chemotherapy in the past.  He is currently on follow-up for prostate cancer with no metastatic disease.      There is no clear history of heavy metal exposure.  Family refused to do genetic testing for further evaluation.  His mom has history of hip replacement in 60s.      He noted symptoms of neuropathy after his back surgery almost 2 years after the surgery.  His initial symptoms were back pain and after the surgery he noted burning sensation in the soles of feet which was progressive in nature and it has progressed up to knees now.  The pain can vary in lower extremity but it can get up to 8/10 in intensity.  He also noted tingling in feet right more than left.  He also noted some tingling in hands for past 8 years which could be up to 2/10 in intensity.  He noted dropping things due to some weakness in hands.  He sometimes wake up at  night because of neuropathic pain.  He has been using AFOs for 6 years.    He also noted mild spasm in the neck.  It is happening 2-3 times per week.  He was diagnosed with esophageal pouch with mild difficulty occasionally on swallowing food.  There is no breathing difficulty.      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        There is no history of smoking and he occasionally drinks wine.        Chart Review:  4/17/23  He states his Lyrica has helped improve his neuropathy pain. He is taking 225 mg in the night and 50 mg in the day. Increasing the dose in the day caused tinnitus. If he cuts down on the dose his tinnitus is not as bad. He continues to have numbness, tingling and burning- below knee. Right > leftThe right leg burns more than the left leg. He has had two falls since he last saw me. He uses the AFO braces for long distances. very slight elevation of P/Q Type Calcium Channel Ab (0.04 nmol/L) of unclear clinical significance.    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 Unclear clinical significance, if family members who are symptomatic have the same VUS could be of clinical significance- has undergone genetic counseling. His symptoms started at age 62 and have progressively worsened since. ( He has extremely thin calves and hammer toes which can be seen in CMT). He also has a VUS in the  DNMT1 gene is associated with autosomal dominant cerebellar ataxia, deafness, and narcolepsy (ADCADN) and hereditary sensory neuropathy type 1E (HSN1E). He does not have this clinical phenotype.     11/12/2020 --genetics team  The HSPB1 gene is associated with Charcot-Selena-Tooth disease, type 2F. This disorder is characterized by symmetric progressive weakness starting between ages 15-25 years old. DNMT1 is  "associated with hereditary sensory neuropathy type IE which is an autosomal dominant disorder with progressive sensory loss including of hearing and early onset dementia. Onset of symptoms is usually in adulthood. The clinical significance of the variants in both DMNT1 and HSPB1 are unknown at thsi time. Given the classification of a VUS caution should be used before using this results for management decisions. The testing company offers two family members to be testing for the HSPB1 VUS, which could aid in reclassification. Mr. Rutherford will discuss familial variant testing with his siblings    9/3/20-- Kimberley Millan  started around 2012 and then this intensified since then. He had sciaticca  Initially which is pain in the back of his thigh which goes into his ankle, mainly on the right side but can happen on the left. Constant neuropathy in his right lower extremity, intermittent in the left leg and intermittent neuropathy in his left > right hand.   He describes "neuropathy" as calf weakness, pain from his shin goes into his toes, he can move his big toe but not his other toes. The pain is described as pins and needles, burning, ice cold at times, 6-8/10 in intensity. Now the pain has moved up to his calves but this has been very. Also has constant low back pain which is between 5-8/10 in intensity. He takes Hydrocodone, Robaxin, sometimes Advil, Lyrica, wine late at night helps. Changing positions helps, ice helps significantly. Since his surgery, low back pain and gait is better. He also has enodrses tingling involving both his feet from his ankles into toes. 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    Pertinent work up based on chart review for current condition:  Paraneoplastic antibody panel on 02/06/2023 with mildly high P/Q type calcium " channel antibody but rest of the panel is negative.    Hemoglobin A1c 5.6.    TSH 1.02 normal   C-reactive protein 0.7 normal   Comprehensive metabolic panel normal except for mildly high blood sugar level of 129 almost 1 year ago.     normal   Vitamin B6 -- 20 normal   Free serum light chains done in 2020 normal   Vitamin B12-- 579 normal   Vitamin B1-- 70 normal   Vitamin B6 124 high in the past.    ESR 17-- 2 years ago   RPR nonreactive   Serum protein electrophoresis 6.8 normal   Serum immunofixation normal   Serum folate 16.2 normal     01/27/2022.    NCS/EMG in upper extremities was normal.      03/14/2022.    NCS/EMG reviewed which is consistent with length dependent predominantly motor neuropathy with minimal denervation changes.            05/04/2015.  NCS/EMG         NCS/EMG from 08/05/2014.    Reviewed and consistent with sensorimotor neuropathy with mixed features.      NCS/EMG from 01/25/2013 reviewed    MRI lumbar spine on 12/12/2022.    1. Postoperative change of L3 through S1 as detailed above.  2. Lumbar degenerative changes most pronounced at L2-L3 noting mild-to-moderate spinal canal and lateral recess stenosis and mild bilateral neural foraminal narrowing, findings that have progressed when compared to MRI dated 07/22/2020.  3. Stable remote compression fractures of the T12 and L1 vertebral bodies.    MRI cervical spine from 01/04/2021.    Multilevel cervical spondylosis as detailed level by level above.  Mild interval worsening of spinal canal stenosis at C3-4.     Left thyroid heterogeneity previously biopsied with benign pathology.  No follow-up imaging is recommended.    MRI brain on 09/12/2020   No evidence of acute intracranial pathology. Unremarkable trigeminal nerves without evidence of external compression.              Last CBC Results:   Lab Results   Component Value Date    WBC 5.95 03/25/2022    HGB 15.2 03/25/2022    HCT 47.5 03/25/2022     03/25/2022       Last CMP  Results  Lab Results   Component Value Date     01/10/2023    K 4.5 01/10/2023     01/10/2023    CO2 30 (H) 01/10/2023    BUN 15 01/10/2023    CREATININE 0.8 01/10/2023    CALCIUM 9.8 01/10/2023    ALBUMIN 4.3 01/10/2023    AST 22 01/10/2023    ALT 22 01/10/2023       Last Lipids  Lab Results   Component Value Date    CHOL 189 01/10/2023    TRIG 118 01/10/2023    HDL 62 01/10/2023    LDLCALC 103.4 01/10/2023       Last A1C  Lab Results   Component Value Date    HGBA1C 5.6 07/12/2022       Last TSH  Lab Results   Component Value Date    TSH 1.025 07/12/2022       Review of Systems:  12 system review of systems is negative except for the symptoms mentioned in HPI.       PAST MEDICAL HISTORY:  Past Medical History:   Diagnosis Date    Allergy     Arthritis     Back pain     after trauma beginning in 89    Cataract     Chronic pain     neck and left shoulder    Cluster headache 2013    Colon polyps 2007 2007-2019: TA x5, HP x3    Degenerative disc disease     Depression     Diverticulitis 12/2013    Dry eye syndrome     Fibromyalgia 2013    GERD (gastroesophageal reflux disease)     Hepatitis 1970's    A    History of prostate biopsy 2002    Hyperlipidemia     Joint pain     Prostate cancer 07/2021    PVD (posterior vitreous detachment)     Salzmann's nodular dystrophy of left eye     Sleep apnea     Thyroid nodule 07/16/2014    Trigeminal neuralgia of left side of face     Tubular adenoma of colon 2007    5 removed [id number]    Visual disturbance 2012    problems after cataract surgery       PAST SURGICAL HISTORY:  Past Surgical History:   Procedure Laterality Date    BACK SURGERY      CARPAL TUNNEL RELEASE Right 5/18/2021    Procedure: RELEASE, CARPAL TUNNEL;  Surgeon: Kaye Solis MD;  [facility];  Service: Orthopedics;  Laterality: Right;    CATARACT EXTRACTION W/  INTRAOCULAR LENS IMPLANT Bilateral     CHOLECYSTECTOMY      COLONOSCOPY N/A 12/17/2019    Normal - Repeat 5yrs    COLONOSCOPY   2007    2007 TA x2, 2011 TA x3, 2014 HP x3, 2019 normal    COSMETIC SURGERY  2/10/2015    Direct mid-forehead brow lift    COSMETIC SURGERY  2/10/2015    Bilateral upper lid blepahroplasty    CYSTOSCOPY WITH URETEROSCOPY, RETROGRADE PYELOGRAPHY, AND INSERTION OF STENT Left 8/19/2020    Procedure: CYSTOSCOPY, WITH RETROGRADE PYELOGRAM AND URETERAL STENT INSERTION;  Surgeon: Katelynn George MD;  Location: [facility];  Service: Urology;  Laterality: Left;    ESOPHAGOGASTRODUODENOSCOPY N/A 12/17/2019    Procedure: ESOPHAGOGASTRODUODENOSCOPY (EGD);  Surgeon: Amadou Hardin MD;  Location: [facility] ([address] [place] [address]);  Service: Endoscopy;  Laterality: N/A;    EYE SURGERY      FUSION OF LUMBAR SPINE BY ANTERIOR APPROACH N/A 8/20/2020    Procedure: FUSION, SPINE, LUMBAR, ANTERIOR APPROACH L5-S1 ALIF Stand Alone;  Surgeon: Jason Caldwell MD;  Location: [facility];  Service: Neurosurgery;  Laterality: N/A;    HEMORRHOID SURGERY      with complication of chronic bleeding for 6 weeks     INJECTION OF STEROID Right 12/6/2018    Procedure: INJECTION, STEROID Right SI Joint Block and Steroid Injection;  Surgeon: Jason Caldwell MD;  Location: [facility] OR;  Service: Neurosurgery;  Laterality: Right;  Procedure: Right SI Joint Block and Steroid Injection  Surgery Time: 30 MIN  LOS: 0  Anesthesia: MAC  Radiology: C-arm  Bed: Lequire 4 Poster  Position: Prone    INJECTION OF STEROID Right 9/19/2019    Procedure: INJECTION, STEROID Procedure: Right SI joint block nd steroid injection;  Surgeon: Jason Caldwell MD;  Location: [facility];  Service: Neurosurgery;  Laterality: Right;  Procedure: Right SI joint block nd steroid injection  Surgery Time: 30 mins  LOS:   Anesthesia: General MAC  Radiology:C-arm  Bed: Regular Bed  Position: Prone    IRRIGATION AND DEBRIDEMENT OF UPPER EXTREMITY Left 4/7/2022    Procedure: IRRIGATION AND DEBRIDEMENT, UPPER EXTREMITY;  Surgeon: Kaye Solis MD;  Location: [facility] OR;  Service: Orthopedics;  Laterality: Left;     JOINT REPLACEMENT      KNEE SURGERY      involving arthroscopic surgery to both knees    REPAIR OF EXTENSOR TENDON Left 4/7/2022    Procedure: REPAIR, TENDON, EXTENSOR thumb, EPB and EPL;  Surgeon: Kaye Solis MD;  Location: [facility] OR;  Service: Orthopedics;  Laterality: Left;    SINUS SURGERY      left molar and sinus surgery for trigeminal neuralgia    SPINAL FUSION  06/22/2015    L3-L5 XLIF/TANA    TOTAL HIP ARTHROPLASTY  4/2012    Pt states he had total hip replacement on his left hip.    ULNAR NERVE TRANSPOSITION Left 12/16/2020    Procedure: TRANSPOSITION, NERVE, ULNAR - left carpal and cubital tunnel releases;  Surgeon: Addi Baeur MD;  Location: [facility] OR;  Service: Orthopedics;  Laterality: Left;       CURRENT MEDS:  I have reconciled the patient's home medications and discharge medications with the patient/family. I have updated all changes.  Refer to After-Visit Medication List.    Current Outpatient Medications   Medication Sig Dispense Refill    atorvastatin (LIPITOR) 40 MG tablet Take 1 tablet (40 mg total) by mouth once daily. 90 tablet 3    butalbital-acetaminophen-caffeine -40 mg (FIORICET, ESGIC) -40 mg per tablet Take 1 tablet by mouth daily as needed for 30 days. 30 tablet 0    clonazePAM (KLONOPIN) 0.5 MG tablet Take 1 tablet by mouth twice a day as needed for anxiety 60 tablet 5    ergocalciferol (VITAMIN D2) 50,000 unit Cap Take 1 capsule (50,000 Units total) by mouth every 7 days. 12 capsule 3    fremanezumab-vfrm (AJOVY SYRINGE) 225 mg/1.5 mL injection Inject 1.5 mLs (225 mg total) into the skin every 28 days. 1.5 mL 5    lamoTRIgine (LAMICTAL) 200 MG tablet Take 1 tablet (200 mg total) by mouth once daily. 90 tablet 3    pregabalin (LYRICA) 25 MG capsule Take 2 capsules by mouth daily 90 capsule 1    pregabalin (LYRICA) 75 MG capsule Take 3 capsules by mouth at night 60 capsule 1    RABEprazole (ACIPHEX) 20 mg tablet Take 1 tablet (20 mg total) by mouth 2 (two) times  daily. 180 tablet 3    selegiline (EMSAM) 9 mg/24 hr Place 1 patch onto the skin once daily. 30 patch 11    sildenafiL (VIAGRA) 100 MG tablet Take 1 tablet (100 mg total) by mouth as needed for Erectile Dysfunction (take 30-60 minutes before on empty stomach). Take one hour before. 4 tablet 12    tadalafiL (CIALIS) 10 MG tablet Take 1 tablet (10 mg total) by mouth daily as needed for Erectile Dysfunction. 30 tablet 11    terazosin (HYTRIN) 2 MG capsule Take 1 capsule (2 mg total) by mouth every evening. 30 capsule 11    traZODone (DESYREL) 50 MG tablet Take 1 tablet (50 mg total) by mouth every evening. 90 tablet 3    ubrogepant (UBRELVY) 100 mg tablet Take 1 tablet (100 mg total) by mouth every 2 (two) hours as needed for Migraine. Max 200 mg/day. 16 tablet 2    varenicline (TYRVAYA) 0.03 mg/spray sprm 1 spray by Each Nostril route 2 (two) times a day. 8.4 mL 11     Current Facility-Administered Medications   Medication Dose Route Frequency Provider Last Rate Last Admin    onabotulinumtoxina injection 200 Units  200 Units Intramuscular q12 weeks MIKAYLA Richards   200 Units at 05/08/23 1348       ALLERGIES:  Review of patient's allergies indicates:   Allergen Reactions    Alphagan [brimonidine]      Patient taking MASO-B Selective Inhibitor Selegiline (Emsam)    Coumadin [warfarin]      itch    Oxycodone      hiccups       FAMILY HISTORY:  Family History   Problem Relation Age of Onset    Stroke Mother 86    Colon cancer Mother         early/mid-60s    Uterine cancer Mother 77    Pancreatitis Brother         acute, now s/p nathalia    Diabetes Brother     Macular degeneration Brother     Other Brother         foot drop    Other Father 44        pituitary tumor- CA vs benign?    Heart attack Maternal Grandfather         suspected, 50s    Migraines Sister     Crohn's disease Sister         w/ assoc joint issues    Arthritis Sister     Tremor Daughter     Irritable bowel syndrome Son         leaning toward Crohn's  dx    Neurological Disorders Son         nonspecific, benign fasciculations of calves    Tremor Son     Jaundice Grandchild     Other Maternal Uncle         memory issue    Other Maternal Uncle         memory issue    Cancer Maternal Cousin         origin? maybe thyroid? 40s?    Melanoma Neg Hx     Amblyopia Neg Hx     Blindness Neg Hx     Cataracts Neg Hx     Glaucoma Neg Hx     Hypertension Neg Hx     Retinal detachment Neg Hx     Strabismus Neg Hx     Thyroid disease Neg Hx     Allergic rhinitis Neg Hx     Allergies Neg Hx     Angioedema Neg Hx     Asthma Neg Hx     Eczema Neg Hx     Urticaria Neg Hx     Rhinitis Neg Hx     Immunodeficiency Neg Hx     Atopy Neg Hx        SOCIAL HISTORY:  Social History     Tobacco Use    Smoking status: Never    Smokeless tobacco: Never   Substance Use Topics    Alcohol use: Yes     Comment: occasion    Drug use: No         Objective:   There were no vitals filed for this visit.  Wt Readings from Last 3 Encounters:   05/08/23 1304 80.6 kg (177 lb 9.3 oz)   03/15/23 1436 79 kg (174 lb 3.2 oz)   02/27/23 0918 80.7 kg (178 lb)     There is no height or weight on file to calculate BMI.       Eyes: no tearing, discharge, no erythema   Cardiovascular: Warm and well perfused, pulses equal and symmetrical  Lungs: Normal work of breathing, normal chest wall excursions  Skin:  Distal skin changes with pinkish color in feet.  Psychiatry: Mood and affect are appropriate     GENERAL/CONSTITUTIONAL:    -Well appearing; well nourished    HIGHER INTEGRATIVE FUNCTIONS:   -Attention & concentration: Normal   -Orientation: Oriented to person, place & time  -Memory: Normal  -Language: Normal   -Fund of Knowledge: Normal     CRANIAL NERVES:   -CN 2: Visual fields full  -CN 2,3: PERRL  -CN 3,4,6: EOMI  -CN 5: Facial sensation intact bilaterally  -CN 7: Facial strength/movement intact bilaterally with no weakness  -CN 8: Hearing normal bilaterally  -CN 9,10: Palate elevates symmetrically  -CN 11:  Normal shoulder shrug and head turn.  Good neck flexion and extension strength  -CN 12: Tongue protrudes midline     MOTOR:   -Tone: normal in upper and lower extremities  -atrophy of anterior and posterior compartment of calves  -UE/LE motor:     Upper Ext Right Left Lower Ext Right Left   Shoulder Abd 5 5 Hip flexion 5 5   Elbow flexion 5 5 Knee extension 5 5   Elbow extension 5 5 Knee flexion 5 5   Fingers abduction 5 5 Ankle dorsiflexion 3 4   Wrist extension   Ankle plantar flexion 3 4   Wrist flexion   Great toe dorsiflexion     Finger extension   Thigh adduction 5 5   Finger flexion   Thigh abduction 5 5   Thumb abduction            REFLEXES:      R L  R L   Triceps 2 2 Knee 2 2   Biceps 2 2 Ankle 0 0   BR 2 2        -Flexor plantar reflex bilaterally      COORDINATION:   -FNF normal bilaterally  -postural and action tremors in bilateral upper extremities    GAIT:   -bilateral AFOs.  Unable to stand on toes.  Able to stand on heels with difficulty.    Scheduled Follow-up :  Future Appointments   Date Time Provider Department Center   6/12/2023  8:00 AM Uri Patel MD [facility] NEURO [facility]   6/19/2023  1:45 PM GEORGIANA Up MD [facility]   6/20/2023  8:00 AM NEUROLOGY, EMG CLINIC [facility] NEURO [facility]   6/21/2023  2:45 PM Kaye Solis MD [facility] HAND [facility]   7/3/2023  3:00 PM Kaye Solis MD [facility] ORTHO [place]   7/5/2023  4:00 PM Kaye Solis MD [facility] HAND [facility]   8/3/2023 11:00 AM MIKAYLA Richards [facility] NEURO [place]   8/10/2023 10:30 AM Adolph Adame Jr., MD [facility] RADONC2 [facility]   8/25/2023 10:30 AM LAB, APPOINTMENT [facility] LAB VNP [facility]   10/10/2023  9:00 AM Jackie Ocampo OD [facility] OPTOMTY [facility]       After Visit Medication List :     Medication List            Accurate as of June 12, 2023  7:46 AM. If you have any questions, ask your nurse or doctor.                CONTINUE taking these medications      AJOVY SYRINGE 225 mg/1.5 mL  injection  Generic drug: fremanezumab-vfrm  Inject 1.5 mLs (225 mg total) into the skin every 28 days.     atorvastatin 40 MG tablet  Commonly known as: LIPITOR  Take 1 tablet (40 mg total) by mouth once daily.     butalbital-acetaminophen-caffeine -40 mg -40 mg per tablet  Commonly known as: FIORICET, ESGIC  Take 1 tablet by mouth daily as needed for 30 days.     clonazePAM 0.5 MG tablet  Commonly known as: KlonoPIN  Take 1 tablet by mouth twice a day as needed for anxiety     EMSAM 9 mg/24 hr  Generic drug: selegiline  Place 1 patch onto the skin once daily.     lamoTRIgine 200 MG tablet  Commonly known as: LAMICTAL  Take 1 tablet (200 mg total) by mouth once daily.     * pregabalin 75 MG capsule  Commonly known as: LYRICA  Take 3 capsules by mouth at night     * pregabalin 25 MG capsule  Commonly known as: LYRICA  Take 2 capsules by mouth daily     RABEprazole 20 mg tablet  Commonly known as: ACIPHEX  Take 1 tablet (20 mg total) by mouth 2 (two) times daily.     sildenafiL 100 MG tablet  Commonly known as: VIAGRA  Take 1 tablet (100 mg total) by mouth as needed for Erectile Dysfunction (take 30-60 minutes before on empty stomach). Take one hour before.     tadalafiL 10 MG tablet  Commonly known as: CIALIS  Take 1 tablet (10 mg total) by mouth daily as needed for Erectile Dysfunction.     terazosin 2 MG capsule  Commonly known as: HYTRIN  Take 1 capsule (2 mg total) by mouth every evening.     traZODone 50 MG tablet  Commonly known as: DESYREL  Take 1 tablet (50 mg total) by mouth every evening.     TYRVAYA 0.03 mg/spray Sprm  Generic drug: varenicline  1 spray by Each Nostril route 2 (two) times a day.     UBRELVY 100 mg tablet  Generic drug: ubrogepant  Take 1 tablet (100 mg total) by mouth every 2 (two) hours as needed for Migraine. Max 200 mg/day.     VITAMIN D2 50,000 unit Cap  Generic drug: ergocalciferol  Take 1 capsule (50,000 Units total) by mouth every 7 days.           * This list has 2  medication(s) that are the same as other medications prescribed for you. Read the directions carefully, and ask your doctor or other care provider to review them with you.                  Signing Physician:              Uri Patel MD

## 2023-06-13 ENCOUNTER — PATIENT MESSAGE (OUTPATIENT)
Dept: NEUROLOGY | Facility: CLINIC | Age: 73
End: 2023-06-13
Payer: COMMERCIAL

## 2023-06-13 LAB
ANA SER-ACNC: NORMAL
ARSENIC BLD-MCNC: <1 NG/ML
CADMIUM BLD-MCNC: 0.3 NG/ML
CITY: NORMAL
COUNTY: NORMAL
GUARDIAN FIRST NAME: NORMAL
GUARDIAN LAST NAME: NORMAL
HOME PHONE: NORMAL
LEAD BLD-MCNC: 1.2 MCG/DL
MERCURY BLD-MCNC: <1 NG/ML
RACE: NORMAL
STATE: NORMAL
STREET ADDRESS: NORMAL
VENOUS/CAPILLARY: NORMAL
ZIP: NORMAL

## 2023-06-14 LAB
ANCA AB TITR SER IF: NORMAL TITER
P-ANCA TITR SER IF: NORMAL TITER

## 2023-06-14 RX ORDER — PREGABALIN 75 MG/1
CAPSULE ORAL
Qty: 90 CAPSULE | Refills: 1 | Status: SHIPPED | OUTPATIENT
Start: 2023-06-14 | End: 2023-08-09 | Stop reason: SDUPTHER

## 2023-06-14 NOTE — TELEPHONE ENCOUNTER
No care due was identified.  [facility] Embedded Care Due Messages. Reference number: [id number].   6/13/2023 8:38:35 PM CDT

## 2023-06-20 ENCOUNTER — OFFICE VISIT (OUTPATIENT)
Dept: SPORTS MEDICINE | Facility: CLINIC | Age: 73
End: 2023-06-20
Payer: COMMERCIAL

## 2023-06-20 ENCOUNTER — PATIENT MESSAGE (OUTPATIENT)
Dept: SPORTS MEDICINE | Facility: CLINIC | Age: 73
End: 2023-06-20

## 2023-06-20 DIAGNOSIS — M25.512 CHRONIC LEFT SHOULDER PAIN: ICD-10-CM

## 2023-06-20 DIAGNOSIS — G89.29 CHRONIC LEFT SHOULDER PAIN: ICD-10-CM

## 2023-06-20 DIAGNOSIS — M67.922 BICEPS TENDINOPATHY, LEFT: Primary | ICD-10-CM

## 2023-06-20 DIAGNOSIS — M75.112 INCOMPLETE TEAR OF LEFT ROTATOR CUFF, UNSPECIFIED WHETHER TRAUMATIC: ICD-10-CM

## 2023-06-20 DIAGNOSIS — M94.212 CHONDROMALACIA OF LEFT SHOULDER: ICD-10-CM

## 2023-06-20 PROCEDURE — 1157F ADVNC CARE PLAN IN RCRD: CPT | Mod: CPTII,95,, | Performed by: ORTHOPAEDIC SURGERY

## 2023-06-20 PROCEDURE — 99213 PR OFFICE/OUTPT VISIT, EST, LEVL III, 20-29 MIN: ICD-10-PCS | Mod: 95,,, | Performed by: ORTHOPAEDIC SURGERY

## 2023-06-20 PROCEDURE — 1157F PR ADVANCE CARE PLAN OR EQUIV PRESENT IN MEDICAL RECORD: ICD-10-PCS | Mod: CPTII,95,, | Performed by: ORTHOPAEDIC SURGERY

## 2023-06-20 PROCEDURE — 99213 OFFICE O/P EST LOW 20 MIN: CPT | Mod: 95,,, | Performed by: ORTHOPAEDIC SURGERY

## 2023-06-20 NOTE — PROGRESS NOTES
Telemedicine/Virtual Visit Documentation:     The patient location is: home    The chief complaint leading to consultation is: see HPI    VISIT TYPE X   Virtual visit with synchronous audio and video x   Telephone E/M service      Total time spent with patient: see X flaca on chart below.   More than half of the time was spent counseling or coordinating care including prognosis, differential diagnosis, risks and benefits of treatment, instructions, compliance risk reductions     EST MINUTES X   18208 5    79884 10    73437 15 x   99214 25    50111 40    NEW     29426 10    65395 20    87774 30    57030 45    49648 60    PHONE      5-10    90362 11-20    41520 21-30      H&P  Orthopaedics      SUBJECTIVE:     History of Present Illness:  Patient is a 73 y.o. male who presents as a new patient to me.  Referred for chronic left shoulder pain dating back to earlier this year.  He has been seen by multiple mid-level providers over in the General Orthopedic Department.  He was given a subacromial steroid injection in been March which provided approximately 70% relief in pain for a few weeks.  Additionally he has had physical therapy at the [facility] location with Ronald Zurita.  Has taken Celebrex and Mobic a different times.  Chief complaint of ongoing left shoulder pain worse with overhead activity.  Localizes more so over the anterior shoulder and proximal biceps region.  Denies any antecedent injury mechanism.  I was supposed to see him in person today but he has a viral illness and this has been transition to a virtual visit.  Denies any neck or radicular symptoms at this time.  Accompanied by his wife.      Review of patient's allergies indicates:   Allergen Reactions    Alphagan [brimonidine]      Patient taking MASO-B Selective Inhibitor Selegiline (Emsam)    Coumadin [warfarin]      itch    Oxycodone      hiccups       Past Medical History:   Diagnosis Date    Allergy     Arthritis     Back pain     after trauma  beginning in 195    Cataract     Chronic pain     neck and left shoulder    Cluster headache 2013    Colon polyps 2007 2007-2019: TA x5, HP x3    Degenerative disc disease     Depression     Diverticulitis 12/2013    Dry eye syndrome     Fibromyalgia 2013    GERD (gastroesophageal reflux disease)     Hepatitis 1970's    A    History of prostate biopsy 2002    Hyperlipidemia     Joint pain     Prostate cancer 07/2021    PVD (posterior vitreous detachment)     Salzmann's nodular dystrophy of left eye     Sleep apnea     Thyroid nodule 07/16/2014    Trigeminal neuralgia of left side of face     Tubular adenoma of colon 2007    5 removed [id number]    Visual disturbance 2012    problems after cataract surgery     Past Surgical History:   Procedure Laterality Date    BACK SURGERY      CARPAL TUNNEL RELEASE Right 5/18/2021    Procedure: RELEASE, CARPAL TUNNEL;  Surgeon: Kaye Solis MD;  Location: [facility];  Service: Orthopedics;  Laterality: Right;    CATARACT EXTRACTION W/  INTRAOCULAR LENS IMPLANT Bilateral     CHOLECYSTECTOMY      COLONOSCOPY N/A 12/17/2019    Normal - Repeat 5yrs    COLONOSCOPY  2007 2007 TA x2, 2011 TA x3, 2014 HP x3, 2019 normal    COSMETIC SURGERY  2/10/2015    Direct mid-forehead brow lift    COSMETIC SURGERY  2/10/2015    Bilateral upper lid blepahroplasty    CYSTOSCOPY WITH URETEROSCOPY, RETROGRADE PYELOGRAPHY, AND INSERTION OF STENT Left 8/19/2020    Procedure: CYSTOSCOPY, WITH RETROGRADE PYELOGRAM AND URETERAL STENT INSERTION;  Surgeon: Katelynn George MD;  Location: [facility];  Service: Urology;  Laterality: Left;    ESOPHAGOGASTRODUODENOSCOPY N/A 12/17/2019    Procedure: ESOPHAGOGASTRODUODENOSCOPY (EGD);  Surgeon: Amadou Hardni MD;  Location: [address]);  Service: Endoscopy;  Laterality: N/A;    EYE SURGERY      FUSION OF LUMBAR SPINE BY ANTERIOR APPROACH N/A 8/20/2020    Procedure: FUSION, SPINE, LUMBAR, ANTERIOR APPROACH L5-S1 ALIF Stand Alone;  Surgeon: Jason Caldwell MD;   Location: [facility] OR;  Service: Neurosurgery;  Laterality: N/A;    HEMORRHOID SURGERY      with complication of chronic bleeding for 6 weeks     INJECTION OF STEROID Right 12/6/2018    Procedure: INJECTION, STEROID Right SI Joint Block and Steroid Injection;  Surgeon: Jason Caldwell MD;  Location: [facility] OR;  Service: Neurosurgery;  Laterality: Right;  Procedure: Right SI Joint Block and Steroid Injection  Surgery Time: 30 MIN  LOS: 0  Anesthesia: MAC  Radiology: C-arm  Bed: Favian 4 Poster  Position: Prone    INJECTION OF STEROID Right 9/19/2019    Procedure: INJECTION, STEROID Procedure: Right SI joint block nd steroid injection;  Surgeon: Jason Caldwell MD;  Location: [facility] OR;  Service: Neurosurgery;  Laterality: Right;  Procedure: Right SI joint block nd steroid injection  Surgery Time: 30 mins  LOS:   Anesthesia: General MAC  Radiology:C-arm  Bed: Regular Bed  Position: Prone    IRRIGATION AND DEBRIDEMENT OF UPPER EXTREMITY Left 4/7/2022    Procedure: IRRIGATION AND DEBRIDEMENT, UPPER EXTREMITY;  Surgeon: Kaye Solis MD;  Location: [facility] OR;  Service: Orthopedics;  Laterality: Left;    JOINT REPLACEMENT      KNEE SURGERY      involving arthroscopic surgery to both knees    REPAIR OF EXTENSOR TENDON Left 4/7/2022    Procedure: REPAIR, TENDON, EXTENSOR thumb, EPB and EPL;  Surgeon: Kaye Solis MD;  Location: [facility] OR;  Service: Orthopedics;  Laterality: Left;    SINUS SURGERY      left molar and sinus surgery for trigeminal neuralgia    SPINAL FUSION  06/22/2015    L3-L5 XLIF/TANA    TOTAL HIP ARTHROPLASTY  4/2012    Pt states he had total hip replacement on his left hip.    ULNAR NERVE TRANSPOSITION Left 12/16/2020    Procedure: TRANSPOSITION, NERVE, ULNAR - left carpal and cubital tunnel releases;  Surgeon: Addi Bauer MD;  Location: [facility];  Service: Orthopedics;  Laterality: Left;     Family History   Problem Relation Age of Onset    Stroke Mother 86    Colon cancer Mother          early/mid-60s    Uterine cancer Mother 77    Pancreatitis Brother         acute, now s/p nathalia    Diabetes Brother     Macular degeneration Brother     Other Brother         foot drop    Other Father 44        pituitary tumor- CA vs benign?    Heart attack Maternal Grandfather         suspected, 50s    Migraines Sister     Crohn's disease Sister         w/ assoc joint issues    Arthritis Sister     Tremor Daughter     Irritable bowel syndrome Son         leaning toward Crohn's dx    Neurological Disorders Son         nonspecific, benign fasciculations of calves    Tremor Son     Jaundice Grandchild     Other Maternal Uncle         memory issue    Other Maternal Uncle         memory issue    Cancer Maternal Cousin         origin? maybe thyroid? 40s?    Melanoma Neg Hx     Amblyopia Neg Hx     Blindness Neg Hx     Cataracts Neg Hx     Glaucoma Neg Hx     Hypertension Neg Hx     Retinal detachment Neg Hx     Strabismus Neg Hx     Thyroid disease Neg Hx     Allergic rhinitis Neg Hx     Allergies Neg Hx     Angioedema Neg Hx     Asthma Neg Hx     Eczema Neg Hx     Urticaria Neg Hx     Rhinitis Neg Hx     Immunodeficiency Neg Hx     Atopy Neg Hx      Social History     Tobacco Use    Smoking status: Never    Smokeless tobacco: Never   Substance Use Topics    Alcohol use: Yes     Comment: occasion    Drug use: No      Review of Systems:  Patient denies constitutional symptoms, cardiac symptoms, respiratory symptoms, GI symptoms.  The remainder of the musculoskeletal ROS is included in the HPI.    OBJECTIVE:     Physical Exam:  Gen:  No acute distress    Prior radiographs of the left shoulder show no significant degenerative findings.  No proximal humeral migration.    MRI left shoulder:  1. Supraspinatus tendinosis with articular surface fraying and a small, low-grade partial-thickness interstitial/concealed footprint tear.  2. Infraspinatus and subscapularis tendinosis.  3. SLAP tear that involves the biceps anchor.  4.  Biceps tendinosis with partial-thickness interstitial tearing.  5. Mild glenohumeral osteoarthritis.  6. Subacromial/subdeltoid bursitis.  7. AC joint arthrosis.    ASSESSMENT/PLAN:     A/P: Raffy Rutherford Jr. is a 73 y.o. with left shoulder partial-thickness rotator cuff tear, low-grade, slap tear, biceps partial tearing, chondromalacia    Plan:  The patient localizes his typical pain now over the anterior proximal biceps region.  He has partial tearing of the biceps on MRI.  He would like to avoid surgery if possible.  We will do an ultrasound-guided corticosteroid injection into the biceps sheath on Thursday in clinic.  Referral placed to [place] for physical therapy.  Continue anti-inflammatory medication as needed.  We will continue non operative treatment for now.  If he has any continued or recurrent symptoms despite these additional measures, next step would be consideration for arthroscopy for debridement and biceps tenotomy versus tenodesis

## 2023-06-21 ENCOUNTER — PATIENT MESSAGE (OUTPATIENT)
Dept: NEUROLOGY | Facility: CLINIC | Age: 73
End: 2023-06-21
Payer: COMMERCIAL

## 2023-06-21 ENCOUNTER — PATIENT MESSAGE (OUTPATIENT)
Dept: SPORTS MEDICINE | Facility: CLINIC | Age: 73
End: 2023-06-21
Payer: COMMERCIAL

## 2023-06-21 ENCOUNTER — PATIENT MESSAGE (OUTPATIENT)
Dept: ORTHOPEDICS | Facility: CLINIC | Age: 73
End: 2023-06-21

## 2023-06-22 ENCOUNTER — LAB VISIT (OUTPATIENT)
Dept: LAB | Facility: HOSPITAL | Age: 73
End: 2023-06-22
Payer: COMMERCIAL

## 2023-06-22 ENCOUNTER — PATIENT MESSAGE (OUTPATIENT)
Dept: NEUROLOGY | Facility: CLINIC | Age: 73
End: 2023-06-22
Payer: COMMERCIAL

## 2023-06-22 DIAGNOSIS — R53.83 FATIGUE, UNSPECIFIED TYPE: ICD-10-CM

## 2023-06-22 DIAGNOSIS — R19.7 DIARRHEA, UNSPECIFIED TYPE: ICD-10-CM

## 2023-06-22 DIAGNOSIS — K62.5 RECTAL BLEEDING: ICD-10-CM

## 2023-06-22 DIAGNOSIS — K62.5 RECTAL BLEEDING: Primary | ICD-10-CM

## 2023-06-22 LAB
ALBUMIN SERPL BCP-MCNC: 3.8 G/DL (ref 3.5–5.2)
ALP SERPL-CCNC: 41 U/L (ref 55–135)
ALT SERPL W/O P-5'-P-CCNC: 107 U/L (ref 10–44)
ANION GAP SERPL CALC-SCNC: 9 MMOL/L (ref 8–16)
AST SERPL-CCNC: 58 U/L (ref 10–40)
BASOPHILS # BLD AUTO: 0.02 K/UL (ref 0–0.2)
BASOPHILS NFR BLD: 0.6 % (ref 0–1.9)
BILIRUB SERPL-MCNC: 0.4 MG/DL (ref 0.1–1)
BUN SERPL-MCNC: 7 MG/DL (ref 8–23)
CALCIUM SERPL-MCNC: 9.3 MG/DL (ref 8.7–10.5)
CHLORIDE SERPL-SCNC: 100 MMOL/L (ref 95–110)
CO2 SERPL-SCNC: 27 MMOL/L (ref 23–29)
CREAT SERPL-MCNC: 0.7 MG/DL (ref 0.5–1.4)
DIFFERENTIAL METHOD: ABNORMAL
EOSINOPHIL # BLD AUTO: 0.1 K/UL (ref 0–0.5)
EOSINOPHIL NFR BLD: 3.4 % (ref 0–8)
ERYTHROCYTE [DISTWIDTH] IN BLOOD BY AUTOMATED COUNT: 11.9 % (ref 11.5–14.5)
EST. GFR  (NO RACE VARIABLE): >60 ML/MIN/1.73 M^2
GLUCOSE SERPL-MCNC: 105 MG/DL (ref 70–110)
HCT VFR BLD AUTO: 46.3 % (ref 40–54)
HGB BLD-MCNC: 15.7 G/DL (ref 14–18)
IMM GRANULOCYTES # BLD AUTO: 0.02 K/UL (ref 0–0.04)
IMM GRANULOCYTES NFR BLD AUTO: 0.6 % (ref 0–0.5)
LYMPHOCYTES # BLD AUTO: 1.6 K/UL (ref 1–4.8)
LYMPHOCYTES NFR BLD: 48.6 % (ref 18–48)
MCH RBC QN AUTO: 30.8 PG (ref 27–31)
MCHC RBC AUTO-ENTMCNC: 33.9 G/DL (ref 32–36)
MCV RBC AUTO: 91 FL (ref 82–98)
MONOCYTES # BLD AUTO: 0.4 K/UL (ref 0.3–1)
MONOCYTES NFR BLD: 12.4 % (ref 4–15)
NEUTROPHILS # BLD AUTO: 1.1 K/UL (ref 1.8–7.7)
NEUTROPHILS NFR BLD: 34.4 % (ref 38–73)
NRBC BLD-RTO: 0 /100 WBC
PLATELET # BLD AUTO: 190 K/UL (ref 150–450)
PLATELET BLD QL SMEAR: ABNORMAL
PMV BLD AUTO: 9.6 FL (ref 9.2–12.9)
POTASSIUM SERPL-SCNC: 3.5 MMOL/L (ref 3.5–5.1)
PROT SERPL-MCNC: 7.1 G/DL (ref 6–8.4)
RBC # BLD AUTO: 5.09 M/UL (ref 4.6–6.2)
SODIUM SERPL-SCNC: 136 MMOL/L (ref 136–145)
WBC # BLD AUTO: 3.23 K/UL (ref 3.9–12.7)

## 2023-06-22 PROCEDURE — 80053 COMPREHEN METABOLIC PANEL: CPT | Performed by: NURSE PRACTITIONER

## 2023-06-22 PROCEDURE — 36415 COLL VENOUS BLD VENIPUNCTURE: CPT | Performed by: NURSE PRACTITIONER

## 2023-06-22 PROCEDURE — 85025 COMPLETE CBC W/AUTO DIFF WBC: CPT | Performed by: NURSE PRACTITIONER

## 2023-06-23 ENCOUNTER — PATIENT MESSAGE (OUTPATIENT)
Dept: NEUROLOGY | Facility: CLINIC | Age: 73
End: 2023-06-23
Payer: COMMERCIAL

## 2023-06-26 ENCOUNTER — OFFICE VISIT (OUTPATIENT)
Dept: ORTHOPEDICS | Facility: CLINIC | Age: 73
End: 2023-06-26
Payer: COMMERCIAL

## 2023-06-26 DIAGNOSIS — M72.0 DUPUYTREN'S CONTRACTURE OF RIGHT HAND: Primary | ICD-10-CM

## 2023-06-26 DIAGNOSIS — M72.0 DUPUYTREN'S CONTRACTURE OF BOTH HANDS: ICD-10-CM

## 2023-06-26 PROCEDURE — 1160F RVW MEDS BY RX/DR IN RCRD: CPT | Mod: CPTII,S$GLB,, | Performed by: ORTHOPAEDIC SURGERY

## 2023-06-26 PROCEDURE — 1160F PR REVIEW ALL MEDS BY PRESCRIBER/CLIN PHARMACIST DOCUMENTED: ICD-10-PCS | Mod: CPTII,S$GLB,, | Performed by: ORTHOPAEDIC SURGERY

## 2023-06-26 PROCEDURE — 1101F PR PT FALLS ASSESS DOC 0-1 FALLS W/OUT INJ PAST YR: ICD-10-PCS | Mod: CPTII,S$GLB,, | Performed by: ORTHOPAEDIC SURGERY

## 2023-06-26 PROCEDURE — 1126F PR PAIN SEVERITY QUANTIFIED, NO PAIN PRESENT: ICD-10-PCS | Mod: CPTII,S$GLB,, | Performed by: ORTHOPAEDIC SURGERY

## 2023-06-26 PROCEDURE — 1159F MED LIST DOCD IN RCRD: CPT | Mod: CPTII,S$GLB,, | Performed by: ORTHOPAEDIC SURGERY

## 2023-06-26 PROCEDURE — 3288F FALL RISK ASSESSMENT DOCD: CPT | Mod: CPTII,S$GLB,, | Performed by: ORTHOPAEDIC SURGERY

## 2023-06-26 PROCEDURE — 99999 PR PBB SHADOW E&M-EST. PATIENT-LVL III: ICD-10-PCS | Mod: PBBFAC,,, | Performed by: ORTHOPAEDIC SURGERY

## 2023-06-26 PROCEDURE — 99213 PR OFFICE/OUTPT VISIT, EST, LEVL III, 20-29 MIN: ICD-10-PCS | Mod: S$GLB,,, | Performed by: ORTHOPAEDIC SURGERY

## 2023-06-26 PROCEDURE — 1126F AMNT PAIN NOTED NONE PRSNT: CPT | Mod: CPTII,S$GLB,, | Performed by: ORTHOPAEDIC SURGERY

## 2023-06-26 PROCEDURE — 3288F PR FALLS RISK ASSESSMENT DOCUMENTED: ICD-10-PCS | Mod: CPTII,S$GLB,, | Performed by: ORTHOPAEDIC SURGERY

## 2023-06-26 PROCEDURE — 99999 PR PBB SHADOW E&M-EST. PATIENT-LVL III: CPT | Mod: PBBFAC,,, | Performed by: ORTHOPAEDIC SURGERY

## 2023-06-26 PROCEDURE — 99213 OFFICE O/P EST LOW 20 MIN: CPT | Mod: S$GLB,,, | Performed by: ORTHOPAEDIC SURGERY

## 2023-06-26 PROCEDURE — 1157F PR ADVANCE CARE PLAN OR EQUIV PRESENT IN MEDICAL RECORD: ICD-10-PCS | Mod: CPTII,S$GLB,, | Performed by: ORTHOPAEDIC SURGERY

## 2023-06-26 PROCEDURE — 1157F ADVNC CARE PLAN IN RCRD: CPT | Mod: CPTII,S$GLB,, | Performed by: ORTHOPAEDIC SURGERY

## 2023-06-26 PROCEDURE — 1101F PT FALLS ASSESS-DOCD LE1/YR: CPT | Mod: CPTII,S$GLB,, | Performed by: ORTHOPAEDIC SURGERY

## 2023-06-26 PROCEDURE — 1159F PR MEDICATION LIST DOCUMENTED IN MEDICAL RECORD: ICD-10-PCS | Mod: CPTII,S$GLB,, | Performed by: ORTHOPAEDIC SURGERY

## 2023-06-26 NOTE — PROGRESS NOTES
Raffy Rutherford JrLuisa presents for follow up evaluation of   Encounter Diagnosis   Name Primary?    Dupuytren's contracture of both hands Yes     Patient is here today to discuss EMG appointment and upcoming xiaflex appointment for his dupuytrens disease. He states that his numbness and tactile dexterity have improved since his visit in February and does not think that the EMG/NCS is necessary at this time.    He reports Dupuytren's contractures in the right hand interfere with daily activities.    He reports left shoulder pain and has a steroid injection coming up this week for his rotator cuff.    PE:    AA&O x 4.  NAD  HEENT:  NCAT, sclera nonicteric  Lungs:  Respirations are equal and unlabored.  CV:  2+ bilateral upper and lower extremity pulses.  MSK:   right small finger cord with 30 degree MP joint contracture, right long finger cord with 20 degree MP contracture. Left hand full range of motion of fingers no cords or contractures. Neurovascularly intact bilaterally.  5/5 thenar and intrinsic musculature strength.      A/P: Right hand Dupuytren's contracture    1) We have discussed the natural history of Dupuytren's disease as well as treatment options including observation, Xiaflex injections and surgical partial fasciectomy.  The contracture on the right long/small fingers is causing functional problem during his daily activities. The patient would like to think about Xiaflex injection to the right small/long fingers.  He will require 2 vials.  I have ordered this medication and will call the patient when it is approved and ready.  2) F/U for xiaflex injections  3) Call with any questions/concerns in the interim        Kaye Solis MD    Please be aware that this note has been generated with the assistance of [facility] voice-to-text.  Please excuse any spelling or grammatical errors.

## 2023-06-28 DIAGNOSIS — M72.0 DUPUYTREN'S CONTRACTURE OF RIGHT HAND: Primary | ICD-10-CM

## 2023-07-01 NOTE — TELEPHONE ENCOUNTER
No care due was identified.  [facility] Embedded Care Due Messages. Reference number: [id number].   7/01/2023 5:58:06 PM CDT

## 2023-07-02 RX ORDER — TRAZODONE HYDROCHLORIDE 50 MG/1
50 TABLET ORAL NIGHTLY
Qty: 90 TABLET | Refills: 1 | Status: SHIPPED | OUTPATIENT
Start: 2023-07-02 | End: 2023-08-28 | Stop reason: SDUPTHER

## 2023-07-03 ENCOUNTER — PATIENT MESSAGE (OUTPATIENT)
Dept: SPORTS MEDICINE | Facility: CLINIC | Age: 73
End: 2023-07-03
Payer: COMMERCIAL

## 2023-07-03 RX ORDER — LAMOTRIGINE 200 MG/1
200 TABLET ORAL DAILY
Qty: 90 TABLET | Refills: 3 | Status: SHIPPED | OUTPATIENT
Start: 2023-07-03 | End: 2023-08-28 | Stop reason: SDUPTHER

## 2023-07-03 NOTE — TELEPHONE ENCOUNTER
Refill Routing Note   Medication(s) are not appropriate for processing by [facility] for the following reason(s):      Medication outside of protocol    [facility] action(s):  Route  Approve Care Due:  None identified          Appointments  past 12m or future 3m with PCP    Date Provider   Last Visit   1/9/2023 Haseeb Puentes MD   Next Visit   8/28/2023 Haseeb Puentes MD   ED visits in past 90 days: 0        Note composed:11:30 PM 07/02/2023

## 2023-07-06 NOTE — PROGRESS NOTES
CC: Left shoulder pain    Raffy returns to to discuss a possible left shoulder repeat steroid injection.  I saw him for the 1st time virtually recently.  MRI results reviewed.  He has low-grade undersurface cuff tearing with some degenerative changes within the glenohumeral joint and SLAP tearing/biceps tendinopathy.  Complaining of chronic left shoulder pain. He has been seen by multiple mid-level providers over in the General Orthopedic Department.  He was given a subacromial steroid injection in been March which provided approximately 70% relief in pain for a few weeks.  Additionally he has had physical therapy at the [facility] location with Ronald Zurita.  Has taken Celebrex and Mobic a different times.  Chief complaint of ongoing left shoulder pain worse with overhead activity and after playing cello.  Today he localizes over his medial upper arm, occasionally superolateral with provocative movements.  He describes intermittent clicking and catching in the shoulder which is deep.  This is bothersome.  Denies any antecedent injury mechanism.      PMHx notable for cervical and lumbar spine DJD, bilateral foot drop  PSHx notable for left SHARAD 2012, L3-5 fusion 2015, L5-S1 fusion 2020  Negative for tobacco.   Negative for diabetes.    Pain Score:   4    PAST MEDICAL HISTORY:   Past Medical History:   Diagnosis Date    Allergy     Arthritis     Back pain     after trauma beginning in 89    Cataract     Chronic pain     neck and left shoulder    Cluster headache 2013    Colon polyps 2007 2007-2019: TA x5, HP x3    Degenerative disc disease     Depression     Diverticulitis 12/2013    Dry eye syndrome     Fibromyalgia 2013    GERD (gastroesophageal reflux disease)     Hepatitis 1970's    A    History of prostate biopsy 2002    Hyperlipidemia     Joint pain     Prostate cancer 07/2021    PVD (posterior vitreous detachment)     Salzmann's nodular dystrophy of left eye     Sleep apnea     Thyroid  nodule 07/16/2014    Trigeminal neuralgia of left side of face     Tubular adenoma of colon 2007    5 removed [id number]    Visual disturbance 2012    problems after cataract surgery     PAST SURGICAL HISTORY:  Past Surgical History:   Procedure Laterality Date    BACK SURGERY      CARPAL TUNNEL RELEASE Right 5/18/2021    Procedure: RELEASE, CARPAL TUNNEL;  Surgeon: Kaye Solis MD;  Location: [facility];  Service: Orthopedics;  Laterality: Right;    CATARACT EXTRACTION W/  INTRAOCULAR LENS IMPLANT Bilateral     CHOLECYSTECTOMY      COLONOSCOPY N/A 12/17/2019    Normal - Repeat 5yrs    COLONOSCOPY  2007 2007 TA x2, 2011 TA x3, 2014 HP x3, 2019 normal    COSMETIC SURGERY  2/10/2015    Direct mid-forehead brow lift    COSMETIC SURGERY  2/10/2015    Bilateral upper lid blepahroplasty    CYSTOSCOPY WITH URETEROSCOPY, RETROGRADE PYELOGRAPHY, AND INSERTION OF STENT Left 8/19/2020    Procedure: CYSTOSCOPY, WITH RETROGRADE PYELOGRAM AND URETERAL STENT INSERTION;  Surgeon: Katelynn George MD;  Location: [facility];  Service: Urology;  Laterality: Left;    ESOPHAGOGASTRODUODENOSCOPY N/A 12/17/2019    Procedure: ESOPHAGOGASTRODUODENOSCOPY (EGD);  Surgeon: Amadou Hardin MD;  Location: [facility] (4TH FLR);  Service: Endoscopy;  Laterality: N/A;    EYE SURGERY      FUSION OF LUMBAR SPINE BY ANTERIOR APPROACH N/A 8/20/2020    Procedure: FUSION, SPINE, LUMBAR, ANTERIOR APPROACH L5-S1 ALIF Stand Alone;  Surgeon: Jason Caldwell MD;  Location: [facility];  Service: Neurosurgery;  Laterality: N/A;    HEMORRHOID SURGERY      with complication of chronic bleeding for 6 weeks     INJECTION OF STEROID Right 12/6/2018    Procedure: INJECTION, STEROID Right SI Joint Block and Steroid Injection;  Surgeon: Jason Caldwell MD;  Location: [facility];  Service: Neurosurgery;  Laterality: Right;  Procedure: Right SI Joint Block and Steroid Injection  Surgery Time: 30 MIN  LOS: 0  Anesthesia: MAC  Radiology: C-arm  Bed: [place] [id number]  Poster  Position: Prone    INJECTION OF STEROID Right 9/19/2019    Procedure: INJECTION, STEROID Procedure: Right SI joint block nd steroid injection;  Surgeon: Jason Caldwell MD;  Location: [facility];  Service: Neurosurgery;  Laterality: Right;  Procedure: Right SI joint block nd steroid injection  Surgery Time: 30 mins  LOS:   Anesthesia: General MAC  Radiology:C-arm  Bed: Regular Bed  Position: Prone    IRRIGATION AND DEBRIDEMENT OF UPPER EXTREMITY Left 4/7/2022    Procedure: IRRIGATION AND DEBRIDEMENT, UPPER EXTREMITY;  Surgeon: Kaye Solis MD;  Location: [facility] OR;  Service: Orthopedics;  Laterality: Left;    JOINT REPLACEMENT      KNEE SURGERY      involving arthroscopic surgery to both knees    REPAIR OF EXTENSOR TENDON Left 4/7/2022    Procedure: REPAIR, TENDON, EXTENSOR thumb, EPB and EPL;  Surgeon: Kaye Solis MD;  Location: [facility] OR;  Service: Orthopedics;  Laterality: Left;    SINUS SURGERY      left molar and sinus surgery for trigeminal neuralgia    SPINAL FUSION  06/22/2015    L3-L5 XLIF/TANA    TOTAL HIP ARTHROPLASTY  4/2012    Pt states he had total hip replacement on his left hip.    ULNAR NERVE TRANSPOSITION Left 12/16/2020    Procedure: TRANSPOSITION, NERVE, ULNAR - left carpal and cubital tunnel releases;  Surgeon: Addi Bauer MD;  Location: [facility] OR;  Service: Orthopedics;  Laterality: Left;     FAMILY HISTORY:  Family History   Problem Relation Age of Onset    Stroke Mother 86    Colon cancer Mother         early/mid-60s    Uterine cancer Mother 77    Pancreatitis Brother         acute, now s/p nathalia    Diabetes Brother     Macular degeneration Brother     Other Brother         foot drop    Other Father 44        pituitary tumor- CA vs benign?    Heart attack Maternal Grandfather         suspected, 50s    Migraines Sister     Crohn's disease Sister         w/ assoc joint issues    Arthritis Sister     Tremor Daughter     Irritable bowel syndrome Son          leaning toward Crohn's dx    Neurological Disorders Son         nonspecific, benign fasciculations of calves    Tremor Son     Jaundice Grandchild     Other Maternal Uncle         memory issue    Other Maternal Uncle         memory issue    Cancer Maternal Cousin         origin? maybe thyroid? 40s?    Melanoma Neg Hx     Amblyopia Neg Hx     Blindness Neg Hx     Cataracts Neg Hx     Glaucoma Neg Hx     Hypertension Neg Hx     Retinal detachment Neg Hx     Strabismus Neg Hx     Thyroid disease Neg Hx     Allergic rhinitis Neg Hx     Allergies Neg Hx     Angioedema Neg Hx     Asthma Neg Hx     Eczema Neg Hx     Urticaria Neg Hx     Rhinitis Neg Hx     Immunodeficiency Neg Hx     Atopy Neg Hx      MEDICATIONS:    Current Outpatient Medications:     atorvastatin (LIPITOR) 40 MG tablet, Take 1 tablet (40 mg total) by mouth once daily., Disp: 90 tablet, Rfl: 3    butalbital-acetaminophen-caffeine -40 mg (FIORICET, ESGIC) -40 mg per tablet, Take 1 tablet by mouth daily as needed for 30 days., Disp: 30 tablet, Rfl: 0    clindamycin (CLEOCIN) 150 MG capsule, Take 4 capsules (600 mg total) by mouth 1 hour prior to dental appointment., Disp: 12 capsule, Rfl: 1    clonazePAM (KLONOPIN) 0.5 MG tablet, Take 1 tablet by mouth twice a day as needed for anxiety, Disp: 60 tablet, Rfl: 5    ergocalciferol (VITAMIN D2) 50,000 unit Cap, Take 1 capsule (50,000 Units total) by mouth every 7 days., Disp: 12 capsule, Rfl: 3    fremanezumab-vfrm (AJOVY SYRINGE) 225 mg/1.5 mL injection, Inject 1.5 mLs (225 mg total) into the skin every 28 days., Disp: 1.5 mL, Rfl: 5    lamoTRIgine (LAMICTAL) 200 MG tablet, Take 1 tablet (200 mg total) by mouth once daily., Disp: 90 tablet, Rfl: 3    pregabalin (LYRICA) 25 MG capsule, Take 2 capsules by mouth daily, Disp: 90 capsule, Rfl: 1    pregabalin (LYRICA) 75 MG capsule, Take 3 capsules by mouth at night, Disp: 90 capsule, Rfl: 1    RABEprazole (ACIPHEX) 20  mg tablet, Take 1 tablet (20 mg total) by mouth 2 (two) times daily., Disp: 180 tablet, Rfl: 3    selegiline (EMSAM) 9 mg/24 hr, Place 1 patch onto the skin once daily., Disp: 30 patch, Rfl: 11    sildenafiL (VIAGRA) 100 MG tablet, Take 1 tablet (100 mg total) by mouth as needed for Erectile Dysfunction (take 30-60 minutes before on empty stomach). Take one hour before., Disp: 4 tablet, Rfl: 12    tadalafiL (CIALIS) 10 MG tablet, Take 1 tablet (10 mg total) by mouth daily as needed for Erectile Dysfunction., Disp: 30 tablet, Rfl: 11    terazosin (HYTRIN) 2 MG capsule, Take 1 capsule (2 mg total) by mouth every evening. (Patient not taking: Reported on 6/12/2023), Disp: 30 capsule, Rfl: 11    traZODone (DESYREL) 50 MG tablet, Take 1 tablet (50 mg total) by mouth every evening., Disp: 90 tablet, Rfl: 1    ubrogepant (UBRELVY) 100 mg tablet, Take 1 tablet (100 mg total) by mouth every 2 (two) hours as needed for Migraine. Max 200 mg/day., Disp: 16 tablet, Rfl: 2    varenicline (TYRVAYA) 0.03 mg/spray sprm, 1 spray by Each Nostril route 2 (two) times a day., Disp: 8.4 mL, Rfl: 11    Current Facility-Administered Medications:     onabotulinumtoxina injection 200 Units, 200 Units, Intramuscular, q12 weeks, MIKAYLA Richards, 200 Units at 05/08/23 1348    ALLERGIES:  Review of patient's allergies indicates:   Allergen Reactions    Alphagan [brimonidine]      Patient taking MASO-B Selective Inhibitor Selegiline (Emsam)    Coumadin [warfarin]      itch    Oxycodone      hiccups     REVIEW OF SYSTEMS:  Constitution: Negative. Negative for chills, fever and night sweats.    Hematologic/Lymphatic: Negative for bleeding problem. Does not bruise/bleed easily.   Skin: Negative for dry skin, itching and rash.   Musculoskeletal: Negative for falls. Positive for left shoulder pain and muscle weakness.     All other review of symptoms were reviewed and found to be noncontributory.    PHYSICAL EXAMINATION:  Vitals:  BP  "106/68   Pulse 70   Ht 5' 8" (1.727 m)   Wt 86 kg (189 lb 9.5 oz)   BMI 28.83 kg/m²    General: Well-developed well-nourished 73 y.o. malein no acute distress   Cardiovascular: Regular rhythm by palpation of distal pulse, normal color and temperature, no concerning varicosities on symptomatic side   Lungs: No labored breathing or wheezing appreciated   Neuro: Alert and oriented ×3   Psychiatric: well oriented to person, place and time, demonstrates normal mood and affect   Skin: No rashes, lesions or ulcers, normal temperature, turgor, and texture on uninvolved extremity    Ortho/SPM Exam  Examination of the left shoulder demonstrates active forward elevation to 160, ER with arm at side to 60, IR to T10.  Intact overhead passive range of motion.  Denies tenderness along the proximal biceps tendon/bicipital groove. Negative AC tenderness. Pain with resisted supraspinatus, infraspinatus - localizes deep in the joint, 5/5 strength.  Positive glenohumeral grind test.  Positive modified speed's test.  Stable shoulder. No midline neck tenderness. Negative Spurling's maneuver.     IMAGING:  Prior Xrays including AP, Outlet and Axillary Lateral of Left shoulder are ordered / images reviewed by me:   Mild DJD.    MRI left shoulder:  1. Supraspinatus tendinosis with articular surface fraying and a small, low-grade partial-thickness interstitial/concealed footprint tear.  2. Infraspinatus and subscapularis tendinosis.  3. SLAP tear that involves the biceps anchor.  4. Biceps tendinosis with partial-thickness interstitial tearing.  5. Mild glenohumeral osteoarthritis.  6. Subacromial/subdeltoid bursitis.  7. AC joint arthrosis.    ASSESSMENT:      ICD-10-CM ICD-9-CM   1. Primary osteoarthritis, left shoulder  M19.012 715.11   2. Incomplete tear of left rotator cuff, unspecified whether traumatic  M75.112 840.4   3. Chronic left shoulder pain  M25.512 719.41    G89.29 338.29     PLAN:     -Findings and treatment options were " discussed with the patient. Pain not localized to biceps tendon on exam today, more likely osteoarthritis of left shoulder with some associated muscular pain due to him compensating while playing cello.  - Corticosteroid injection administered to left glenohumeral joint  - Continue PT/OT  - Celebrex as needed for pain  -RTC prn  -All questions answered    Large Joint Aspiration/Injection: L glenohumeral    Date/Time: 7/11/2023 11:45 AM  Performed by: GEORGIANA Up MD  Authorized by: GEORGIANA Up MD     Consent Done?:  Yes (Verbal)  Indications:  Pain  Site marked: the procedure site was marked    Timeout: prior to procedure the correct patient, procedure, and site was verified    Prep: patient was prepped and draped in usual sterile fashion      Local anesthesia used?: Yes    Local anesthetic:  Co-phenylcaine spray (0.2% Naropin)  Anesthetic total (ml):  4      Details:  Needle Size:  22 G  Approach:  Superior  Location:  Shoulder  Site:  L glenohumeral  Medications:  40 mg triamcinolone acetonide 40 mg/mL  Patient tolerance:  Patient tolerated the procedure well with no immediate complications

## 2023-07-10 ENCOUNTER — CLINICAL SUPPORT (OUTPATIENT)
Dept: REHABILITATION | Facility: HOSPITAL | Age: 73
End: 2023-07-10
Attending: ORTHOPAEDIC SURGERY
Payer: COMMERCIAL

## 2023-07-10 DIAGNOSIS — G89.29 CHRONIC LEFT SHOULDER PAIN: ICD-10-CM

## 2023-07-10 DIAGNOSIS — R52 PAIN: ICD-10-CM

## 2023-07-10 DIAGNOSIS — M25.512 CHRONIC LEFT SHOULDER PAIN: ICD-10-CM

## 2023-07-10 PROCEDURE — 97110 THERAPEUTIC EXERCISES: CPT

## 2023-07-10 PROCEDURE — 97165 OT EVAL LOW COMPLEX 30 MIN: CPT

## 2023-07-10 NOTE — PATIENT INSTRUCTIONS
[facility]  OCCUPATIONAL THERAPY  HOME EXERCISE PROGRAM     Bend forward 90º at waist, leaning on table for support.   Rock body from side to side and let arm swing freely.  Repeat 10 times. Do 3 sessions per day.    Bend forward 90° at waist, leaning on table for support.   Rock body in a circular pattern to move arm clockwise 10 times then counterclockwise 10 times. Do 3 sessions per day.    Bend forward 90º at waist, using table for support. Rock   body forward and back to swing arm. Repeat 10 times. Do 3 sessions   per day.      Complete the following exercises with 10 repetitions, 3-4x/day.         Start with erect posture. Lower shoulders.       Maintaining erect posture, draw shoulders back while bringing elbows back and inward.      Sit close to table edge and rest arm on surface with elbow straight. Slide entire arm forward as far as you can, then back. Use only shoulder blade movements. Place towel under arm for easier sliding.    Copyright © I. All rights reserved.         Occupational Therapist      Therapist: SHANTI Shukla/JACK

## 2023-07-11 ENCOUNTER — OFFICE VISIT (OUTPATIENT)
Dept: SPORTS MEDICINE | Facility: CLINIC | Age: 73
End: 2023-07-11
Payer: COMMERCIAL

## 2023-07-11 VITALS
BODY MASS INDEX: 28.74 KG/M2 | DIASTOLIC BLOOD PRESSURE: 68 MMHG | SYSTOLIC BLOOD PRESSURE: 106 MMHG | HEART RATE: 70 BPM | WEIGHT: 189.63 LBS | HEIGHT: 68 IN

## 2023-07-11 DIAGNOSIS — M19.012 PRIMARY OSTEOARTHRITIS, LEFT SHOULDER: Primary | ICD-10-CM

## 2023-07-11 DIAGNOSIS — G89.29 CHRONIC LEFT SHOULDER PAIN: ICD-10-CM

## 2023-07-11 DIAGNOSIS — M25.512 CHRONIC LEFT SHOULDER PAIN: ICD-10-CM

## 2023-07-11 DIAGNOSIS — M75.112 INCOMPLETE TEAR OF LEFT ROTATOR CUFF, UNSPECIFIED WHETHER TRAUMATIC: ICD-10-CM

## 2023-07-11 PROCEDURE — 20610 LARGE JOINT ASPIRATION/INJECTION: L GLENOHUMERAL: ICD-10-PCS | Mod: LT,S$GLB,, | Performed by: ORTHOPAEDIC SURGERY

## 2023-07-11 PROCEDURE — 3074F PR MOST RECENT SYSTOLIC BLOOD PRESSURE < 130 MM HG: ICD-10-PCS | Mod: CPTII,S$GLB,, | Performed by: ORTHOPAEDIC SURGERY

## 2023-07-11 PROCEDURE — 1159F MED LIST DOCD IN RCRD: CPT | Mod: CPTII,S$GLB,, | Performed by: ORTHOPAEDIC SURGERY

## 2023-07-11 PROCEDURE — 99214 OFFICE O/P EST MOD 30 MIN: CPT | Mod: 25,S$GLB,, | Performed by: ORTHOPAEDIC SURGERY

## 2023-07-11 PROCEDURE — 3078F PR MOST RECENT DIASTOLIC BLOOD PRESSURE < 80 MM HG: ICD-10-PCS | Mod: CPTII,S$GLB,, | Performed by: ORTHOPAEDIC SURGERY

## 2023-07-11 PROCEDURE — 1125F PR PAIN SEVERITY QUANTIFIED, PAIN PRESENT: ICD-10-PCS | Mod: CPTII,S$GLB,, | Performed by: ORTHOPAEDIC SURGERY

## 2023-07-11 PROCEDURE — 1157F PR ADVANCE CARE PLAN OR EQUIV PRESENT IN MEDICAL RECORD: ICD-10-PCS | Mod: CPTII,S$GLB,, | Performed by: ORTHOPAEDIC SURGERY

## 2023-07-11 PROCEDURE — 1157F ADVNC CARE PLAN IN RCRD: CPT | Mod: CPTII,S$GLB,, | Performed by: ORTHOPAEDIC SURGERY

## 2023-07-11 PROCEDURE — 3288F FALL RISK ASSESSMENT DOCD: CPT | Mod: CPTII,S$GLB,, | Performed by: ORTHOPAEDIC SURGERY

## 2023-07-11 PROCEDURE — 99999 PR PBB SHADOW E&M-EST. PATIENT-LVL IV: ICD-10-PCS | Mod: PBBFAC,,, | Performed by: ORTHOPAEDIC SURGERY

## 2023-07-11 PROCEDURE — 20610 DRAIN/INJ JOINT/BURSA W/O US: CPT | Mod: LT,S$GLB,, | Performed by: ORTHOPAEDIC SURGERY

## 2023-07-11 PROCEDURE — 1159F PR MEDICATION LIST DOCUMENTED IN MEDICAL RECORD: ICD-10-PCS | Mod: CPTII,S$GLB,, | Performed by: ORTHOPAEDIC SURGERY

## 2023-07-11 PROCEDURE — 3288F PR FALLS RISK ASSESSMENT DOCUMENTED: ICD-10-PCS | Mod: CPTII,S$GLB,, | Performed by: ORTHOPAEDIC SURGERY

## 2023-07-11 PROCEDURE — 3078F DIAST BP <80 MM HG: CPT | Mod: CPTII,S$GLB,, | Performed by: ORTHOPAEDIC SURGERY

## 2023-07-11 PROCEDURE — 3008F PR BODY MASS INDEX (BMI) DOCUMENTED: ICD-10-PCS | Mod: CPTII,S$GLB,, | Performed by: ORTHOPAEDIC SURGERY

## 2023-07-11 PROCEDURE — 1125F AMNT PAIN NOTED PAIN PRSNT: CPT | Mod: CPTII,S$GLB,, | Performed by: ORTHOPAEDIC SURGERY

## 2023-07-11 PROCEDURE — 99214 PR OFFICE/OUTPT VISIT, EST, LEVL IV, 30-39 MIN: ICD-10-PCS | Mod: 25,S$GLB,, | Performed by: ORTHOPAEDIC SURGERY

## 2023-07-11 PROCEDURE — 3074F SYST BP LT 130 MM HG: CPT | Mod: CPTII,S$GLB,, | Performed by: ORTHOPAEDIC SURGERY

## 2023-07-11 PROCEDURE — 1101F PT FALLS ASSESS-DOCD LE1/YR: CPT | Mod: CPTII,S$GLB,, | Performed by: ORTHOPAEDIC SURGERY

## 2023-07-11 PROCEDURE — 3008F BODY MASS INDEX DOCD: CPT | Mod: CPTII,S$GLB,, | Performed by: ORTHOPAEDIC SURGERY

## 2023-07-11 PROCEDURE — 1101F PR PT FALLS ASSESS DOC 0-1 FALLS W/OUT INJ PAST YR: ICD-10-PCS | Mod: CPTII,S$GLB,, | Performed by: ORTHOPAEDIC SURGERY

## 2023-07-11 PROCEDURE — 99999 PR PBB SHADOW E&M-EST. PATIENT-LVL IV: CPT | Mod: PBBFAC,,, | Performed by: ORTHOPAEDIC SURGERY

## 2023-07-11 RX ORDER — CELECOXIB 200 MG/1
200 CAPSULE ORAL 2 TIMES DAILY
Qty: 30 CAPSULE | Refills: 2 | Status: SHIPPED | OUTPATIENT
Start: 2023-07-11 | End: 2023-08-28

## 2023-07-11 RX ORDER — TRIAMCINOLONE ACETONIDE 40 MG/ML
40 INJECTION, SUSPENSION INTRA-ARTICULAR; INTRAMUSCULAR
Status: DISCONTINUED | OUTPATIENT
Start: 2023-07-11 | End: 2023-07-11 | Stop reason: HOSPADM

## 2023-07-11 RX ADMIN — TRIAMCINOLONE ACETONIDE 40 MG: 40 INJECTION, SUSPENSION INTRA-ARTICULAR; INTRAMUSCULAR at 11:07

## 2023-07-14 PROBLEM — R52 PAIN: Status: ACTIVE | Noted: 2023-07-14

## 2023-07-14 NOTE — PLAN OF CARE
[facility]  Occupational Therapy Initial Evaluation     Name: Rfafy Rutherford Jr.  Clinic Number: [id number]    Therapy Diagnosis:   Encounter Diagnoses   Name Primary?    Chronic left shoulder pain     Pain      Physician: GEORGIANA Up MD      Physician Orders: Eval and Treat  Medical Diagnosis: M25.512,G89.29 (ICD-10-CM) - Chronic left shoulder pain  Evaluation Date: 7/10/2023  Insurance Authorization Period Expiration: 7/09/2024  Plan of Care Certification Period: 8 weeks; 9/8/2023  Date of Return to MD: TBD  Visit # / Visits authorized: 1 / 1  FOTO: 1/3    Precautions:  Standard    Time In: 12:05 PM   Time Out: 01:00 PM   Total Billable Time: 55 minutes    Subjective       Date of Onset: January and February 2023       History of Current Condition/Mechanism of Injury: Raffy reports:  L shoulder pain as well as lower back pain, and Dupuytren's B hands. He was being seen by PT  for a course of tx and received dry needling.         Falls: 0     Involved Side: Left   Dominant Side: Right    Mechanism of Injury: Playing eugenio bicep tendonitis     Surgical Procedure: n/a  Imaging:  EXAMINATION:  MRI SHOULDER WITHOUT CONTRAST LEFT     CLINICAL HISTORY:  Shoulder pain, rotator cuff disorder suspected, xray done;     TECHNIQUE:  Routine MRI evaluation of the left shoulder performed without contrast.     COMPARISON:  Radiograph 03/15/2023.     FINDINGS:  ROTATOR CUFF: Supraspinatus tendinosis with articular surface fraying and a small (0.5 cm AP), low-grade partial-thickness (less than 50%) interstitial/concealed footprint tear.  Infraspinatus and subscapularis tendinosis.  Teres minor tendon is intact.  Muscle bulk is preserved.     LABRUM: Abnormal morphology and signal intensity of the superior labrum extending from anterior to posterior and involving the biceps anchor.     BICEPS: Thickening and increased intrasubstance signal of the intra-articular biceps tendon.     BONES: Mild  degenerative osteophyte formation about the medial humeral head and glenoid rim.  No fractures.  No avascular necrosis.  No infiltrative process.     AC JOINT: Mild AC joint arthrosis.  Flat morphology of the lateral acromion with mild thickening of the coracoacromial ligament.     CARTILAGE: Partial-thickness chondral fissuring throughout the medial humeral head and posterior glenoid.  No full-thickness defects or subchondral edema.     MISCELLANEOUS: Fluid distention of the subacromial/subdeltoid bursa.  No joint effusion.  Superior, middle and inferior glenohumeral ligaments are intact.  No axillary lymphadenopathy.     Impression:     1. Supraspinatus tendinosis with articular surface fraying and a small, low-grade partial-thickness interstitial/concealed footprint tear.  2. Infraspinatus and subscapularis tendinosis.  3. SLAP tear that involves the biceps anchor.  4. Biceps tendinosis with partial-thickness interstitial tearing.  5. Mild glenohumeral osteoarthritis.  6. Subacromial/subdeltoid bursitis.  7. AC joint arthrosis.        Electronically signed by: Satya Webb MD  Date:                                            06/05/2023      Prior Therapy: He was seen by PT 6-8 weeks for his L shoulder       Pain:  Functional Pain Scale Rating 0-10:   4/10 on average  3/10 at best  7/10 at worst  Location: anterior/medial/inferior  Description: Aching, Grabbing, and Tight  Aggravating Factors: increased pain after playing and after sleeping   Easing Factors: ice and roll on       Occupation:  Counselor/   Working presently: employed  Duties: 15 hours a week     Functional Limitations/Social History:      Previous functional status includes: Independent with all ADLs.     Current Functional Status   Home/Living environment: lives with their family          Limitation of Functional Status as follows:   ADLs/IADLs:     - Feeding:     - Bathing: I    - Dressing/Grooming: I    - Home Management:     - Driving:  I     Leisure: enjoying time with grand baby     Patient's Goals for Therapy: Pt would like to be able to play music without pain. And less guarded       Past Medical History/Physical Systems Review:   Raffy Rutherford Jr.  has a past medical history of Allergy, Arthritis, Back pain, Cataract, Chronic pain, Cluster headache, Colon polyps, Degenerative disc disease, Depression, Diverticulitis, Dry eye syndrome, Fibromyalgia, GERD (gastroesophageal reflux disease), Hepatitis, History of prostate biopsy, Hyperlipidemia, Joint pain, Prostate cancer, PVD (posterior vitreous detachment), Salzmann's nodular dystrophy of left eye, Sleep apnea, Thyroid nodule, Trigeminal neuralgia of left side of face, Tubular adenoma of colon, and Visual disturbance.    Raffy Rutherford Jr.  has a past surgical history that includes Knee surgery; Hemorrhoid surgery; Cholecystectomy; Sinus surgery; Eye surgery; Back surgery; Joint replacement; Total hip arthroplasty (4/2012); Cosmetic surgery (2/10/2015); Cosmetic surgery (2/10/2015); Cataract extraction w/  intraocular lens implant (Bilateral); Spinal fusion (06/22/2015); Injection of steroid (Right, 12/6/2018); Injection of steroid (Right, 9/19/2019); Esophagogastroduodenoscopy (N/A, 12/17/2019); Colonoscopy (N/A, 12/17/2019); Cystoscopy with ureteroscopy, retrograde pyelography, and insertion of stent (Left, 8/19/2020); Fusion of lumbar spine by anterior approach (N/A, 8/20/2020); Ulnar nerve transposition (Left, 12/16/2020); Carpal tunnel release (Right, 5/18/2021); Colonoscopy (2007); Repair of extensor tendon (Left, 4/7/2022); and Irrigation and debridement of upper extremity (Left, 4/7/2022).    Raffy has a current medication list which includes the following prescription(s): atorvastatin, butalbital-acetaminophen-caffeine -40 mg, celecoxib, clindamycin, clonazepam, ergocalciferol, ajovy syringe, lamotrigine, pregabalin, pregabalin, rabeprazole, selegiline, sildenafil, tadalafil,  terazosin, trazodone, ubrogepant, and tyrvaya, and the following Facility-Administered Medications: onabotulinumtoxina.    Review of patient's allergies indicates:   Allergen Reactions    Alphagan [brimonidine]      Patient taking MASO-B Selective Inhibitor Selegiline (Emsam)    Coumadin [warfarin]      itch    Oxycodone      hiccups        Objective     Observation/Appearance:       Hand ROM. Measured in degrees.   7/10/2023 7/10/2023    Right  Left      Able to make full composite fist      Shoulder ROM:   WNL flexion, abduction, ER, IR   - Pain between  abduction   - Pain with end range flexion   - Pain with ER 20-45        Strength (Dyanmometer) and Pinch Strength (Pinch Gauge)  Measured in pounds and psi. Average of three trials.   7/10/2023 7/10/2023    Right  Left    Rung II deferred    Key Pinch     3pt Pinch     2pt Pinch           Intake Outcome Measure for FOTO initial eval;  Survey    Therapist reviewed FOTO scores for Raffy ANGEL Rutherford Jr. on 7/10/2023.   FOTO documents entered into [employer] - see Media section.    Intake Score:    needs to be taken %         Treatment     Total Treatment time separate from Evaluation time:18    Raffy received the treatments listed below:          therapeutic exercises to develop ROM for 10 minutes including:  - scap retraction/protraction x 10 reps   - pendulums (CW/CCW/front/side) x 10 reps       hot pack for 8 minutes to L shoulder.        Patient Education and Home Exercises      Education provided:   -role of OT, goals for OT, scheduling/cancellations, insurance limitations with patient.  -Additional Education provided: traditional cello posture, avoid painful movements     Written Home Exercises Provided: yes.  Exercises were reviewed and Raffy was able to demonstrate them prior to the end of the session.    Raffy demonstrated good  understanding of the education provided.     Pt was advised to perform these exercises free of pain, and to stop performing  them if pain occurs.    See EMR under Patient Instructions for exercises provided 7/10/2023.    Assessment     Raffy Rutherford Jr. is a 73 y.o. male referred to outpatient occupational therapy and presents with a medical diagnosis of L shoulder pain.    Following medical record review it is determined that pt will benefit from occupational therapy services in order to maximize pain free and/or functional use of left shoulder . The following goals were discussed with the patient and patient is in agreement with them as to be addressed in the treatment plan. The patient's rehab potential is Good.     Anticipated barriers to occupational therapy:     Plan of care discussed with patient: Yes  Patient's spiritual, cultural and educational needs considered and patient is agreeable to the plan of care and goals as stated below:     Medical Necessity is demonstrated by the following  Occupational Profile/History  Co-morbidities and personal factors that may impact the plan of care [] LOW: Brief chart review  [x] MODERATE: Expanded chart review   [] HIGH: Extensive chart review    Moderate / High Support Documentation:      Examination  Performance deficits relating to physical, cognitive or psychosocial skills that result in activity limitations and/or participation restrictions  [x] LOW: addressing 1-3 Performance deficits  [] MODERATE: 3-5 Performance deficits  [] HIGH: 5+ Performance deficits (please support below)    Moderate / High Support Documentation:    Physical:  Joint Stability  Muscle Power/Strength  Muscle Endurance   Strength  Pain    Cognitive:  No Deficits    Psychosocial:    Habits  Routines  Rituals     Treatment Options [x] LOW: Limited options  [] MODERATE: Several options  [] HIGH: Multiple options      Decision Making/ Complexity Score: low       The following goals were discussed with the patient and patient is in agreement with them as to be addressed in the treatment plan.     Goals:     Long  Term Goals (LTGs); to be met by discharge.  Pt will demo improved  strength by 5 pounds   Pt will demo improved shoulder strengthen by measuring 4/5 MMT   Pt will report pain level no greater than 2/10 during functional daily work and community activities   Pt will report decreased limitation score on FOTO based on predicted value.       Short Term Goals (STGs); to be met within 4 weeks.  Assess  strength   Pt will report pain level no greater than 2-3/10 during light activity   Pt will report/demo compliance with HEP and pain reduction modifications        Plan   Certification Period/Plan of care expiration: 7/10/2023 to 9/8/2023.    Outpatient Occupational Therapy 2 times weekly for 8 weeks to include the following interventions: Paraffin, Fluidotherapy, Manual therapy/joint mobilizations, Modalities for pain management, US 3 mhz, Therapeutic exercises/activities., Strengthening, Joint Protection, and Energy Conservation.    Vickie Perkins, OT

## 2023-07-25 ENCOUNTER — PATIENT MESSAGE (OUTPATIENT)
Dept: RADIATION ONCOLOGY | Facility: CLINIC | Age: 73
End: 2023-07-25
Payer: COMMERCIAL

## 2023-07-26 ENCOUNTER — PATIENT MESSAGE (OUTPATIENT)
Dept: REHABILITATION | Facility: HOSPITAL | Age: 73
End: 2023-07-26

## 2023-07-26 ENCOUNTER — PATIENT MESSAGE (OUTPATIENT)
Dept: RADIATION ONCOLOGY | Facility: CLINIC | Age: 73
End: 2023-07-26
Payer: COMMERCIAL

## 2023-07-28 DIAGNOSIS — C61 PROSTATE CANCER: Primary | ICD-10-CM

## 2023-07-31 ENCOUNTER — CLINICAL SUPPORT (OUTPATIENT)
Dept: REHABILITATION | Facility: HOSPITAL | Age: 73
End: 2023-07-31
Payer: COMMERCIAL

## 2023-07-31 DIAGNOSIS — R52 PAIN: Primary | ICD-10-CM

## 2023-07-31 PROCEDURE — 97530 THERAPEUTIC ACTIVITIES: CPT

## 2023-07-31 PROCEDURE — 97110 THERAPEUTIC EXERCISES: CPT

## 2023-07-31 NOTE — PROGRESS NOTES
"  [facility]  Occupational Therapy Treatment Note     Date: 7/31/2023  Name: Raffy Rutherford Jr.  Clinic Number: [id number]    Therapy Diagnosis:   Encounter Diagnosis   Name Primary?    Pain Yes     Physician: GEORGIANA Up MD      Physician Orders: Eval and Treat  Medical Diagnosis: M25.512,G89.29 (ICD-10-CM) - Chronic left shoulder pain  Evaluation Date: 7/10/2023  Insurance Authorization Period Expiration: 7/09/2024  Plan of Care Certification Period: 8 weeks; 9/8/2023  Date of Return to MD: TBD  Visit # / Visits authorized: 1 / 1  FOTO: 1/3     Precautions:  Standard     Time In: 12:05 PM   Time Out: 01:00 PM   Total Billable Time: 55 minutes      Subjective     Pt reports:  "I have questions about an exercise"   He was compliant with home exercise program given last session.   Response to previous treatment:first tx  Functional change: none noted to this date       Pain: 3/10  Location: left shoulder      Objective     Observation/Appearance:         Hand ROM. Measured in degrees.    7/10/2023 7/10/2023     Right  Left        Able to make full composite fist       Shoulder ROM:   WNL flexion, abduction, ER, IR   - Pain between  abduction   - Pain with end range flexion   - Pain with ER 20-45          Strength (Dyanmometer) and Pinch Strength (Pinch Gauge)  Measured in pounds and psi. Average of three trials.    7/10/2023 7/10/2023     Right  Left    Rung II deferred     Key Pinch       3pt Pinch       2pt Pinch                Intake Outcome Measure for FOTO initial eval;  Survey     Therapist reviewed FOTO scores for Raffy Rutherford Jr. on 7/10/2023.   FOTO documents entered into [employer] - see Media section.     Intake Score:    needs to be taken %       Treatment     Raffy received the treatments listed below:        hot pack for 10 minutes to L shoulder.        therapeutic exercises to develop endurance, ROM, and posture for 25 minutes including:  - isometric ER/IR with " yellow band x 10 reps (2 sets)   - yellow theraband ER/IR x 10 reps (2 sets)   - towel table slides scap protraction/retraction x 10 reps   - foam roller thoracic extension + serratus anterior activation x 10 reps   - blue ball (CW/CCW) on wall x 5 sets each (10 circles each direction)        Manual therapy: 10 min   - STM deltoid  - trigger point release bicep          therapeutic activities to improve functional performance for 10  minutes, including:  - full shoulder arc motion with cones (adduction/IR to ER/abduction)   - went over HEP (B ER and B brueggers) with yellow rubberband                 Patient Education and Home Exercises     Education provided:   -   - Progress towards goals     Written Home Exercises Provided: yes.  Exercises were reviewed and Raffy was able to demonstrate them prior to the end of the session.  Raffy demonstrated good  understanding of the HEP provided. See EMR under Patient Instructions for exercises provided during therapy sessions.       Assessment     Good response from trigger point release in prox biceps. Able to spencer all exercises without added pain. Updated HEP to include exercises he was doing on [employer] as well as exercises given last session.      Raffy is progressing well towards his goals and there are no updates to goals at this time. Pt prognosis is fair     Pt will continue to benefit from skilled outpatient occupational therapy to address the deficits listed in the problem list on initial evaluation provide pt/family education and to maximize pt's level of independence in the home and community environment.     Pt's spiritual, cultural and educational needs considered and pt agreeable to plan of care and goals.    Anticipated barriers to occupational therapy: chronic      Goals:    Long Term Goals (LTGs); to be met by discharge.  Pt will demo improved  strength by 5 pounds   Pt will demo improved shoulder strengthen by measuring 4/5 MMT   Pt will report pain  level no greater than 2/10 during functional daily work and community activities   Pt will report decreased limitation score on FOTO based on predicted value.         Short Term Goals (STGs); to be met within 4 weeks.  Assess  strength   Pt will report pain level no greater than 2-3/10 during light activity   Pt will report/demo compliance with HEP and pain reduction modifications    Plan     Updates/Grading for next session: cont with dynamic stability     Vickie Perkins, OT   7/31/2023

## 2023-07-31 NOTE — PATIENT INSTRUCTIONS
Stand straight. Elbows bent, palms up. If using band drape across palms leave a foot of slack in band,   Pull your forearms apart extend your wrist , keep moving forearm apart till shoulders rotate outwardly , straighten your elbows , moving entire arm slightly behind you.

## 2023-08-02 ENCOUNTER — TELEPHONE (OUTPATIENT)
Dept: NEUROLOGY | Facility: CLINIC | Age: 73
End: 2023-08-02
Payer: COMMERCIAL

## 2023-08-02 ENCOUNTER — CLINICAL SUPPORT (OUTPATIENT)
Dept: REHABILITATION | Facility: HOSPITAL | Age: 73
End: 2023-08-02
Payer: COMMERCIAL

## 2023-08-02 ENCOUNTER — PATIENT MESSAGE (OUTPATIENT)
Dept: NEUROLOGY | Facility: CLINIC | Age: 73
End: 2023-08-02
Payer: COMMERCIAL

## 2023-08-02 DIAGNOSIS — R52 PAIN: Primary | ICD-10-CM

## 2023-08-02 PROCEDURE — 97140 MANUAL THERAPY 1/> REGIONS: CPT

## 2023-08-02 PROCEDURE — 97110 THERAPEUTIC EXERCISES: CPT

## 2023-08-02 NOTE — PROGRESS NOTES
"  [facility]  Occupational Therapy Treatment Note     Date: 8/2/2023  Name: Raffy Rutherford Jr.  Clinic Number: [id number]    Therapy Diagnosis:   Encounter Diagnosis   Name Primary?    Pain Yes     Physician: GEORGIANA Up MD      Physician Orders: Eval and Treat  Medical Diagnosis: M25.512,G89.29 (ICD-10-CM) - Chronic left shoulder pain  Evaluation Date: 7/10/2023  Insurance Authorization Period Expiration: 7/09/2024  Plan of Care Certification Period: 8 weeks; 9/8/2023  Date of Return to MD: TBD  Visit # / Visits authorized: 1 / 1  FOTO: 1/3     Precautions:  Standard     Time In: 12:05 PM   Time Out: 01:00 PM   Total Billable Time: 55 minutes    Subjective     Pt reports: "I have this knot right here."  He was compliant with home exercise program given last session.   Response to previous treatment: good  Functional change: traditional cello       Pain: 2/10  Location: left shoulder      Objective   Observation/Appearance:         Hand ROM. Measured in degrees.    7/10/2023 7/10/2023     Right  Left        Able to make full composite fist       Shoulder ROM:   WNL flexion, abduction, ER, IR   - Pain between  abduction   - Pain with end range flexion   - Pain with ER 20-45          Strength (Dyanmometer) and Pinch Strength (Pinch Gauge)  Measured in pounds and psi. Average of three trials.    7/10/2023 7/10/2023     Right  Left    Rung II deferred     Key Pinch       3pt Pinch       2pt Pinch                Intake Outcome Measure for FOTO initial eval;  Survey     Therapist reviewed FOTO scores for Raffy Rutherford Jr. on 7/10/2023.   FOTO documents entered into [employer] - see Media section.     Intake Score:    needs to be taken %          Treatment     Raffy received the treatments listed below:        therapeutic exercises to develop strength, endurance, and posture for 35 minutes including:  - isometric ER/IR with yellow band x 10 reps (2 sets ) each   - show me the money " with yellow rubberband x 10 reps   - rows with yelow theraband x 10 reps   - serratus foam roll x 10 reps on wall       manual therapy techniques: Myofacial release were applied to the: L deltoid/bicpeps for 8 minutes, including:  Trigger point release   Soft tissue anterior/lateral arm          hot pack for 10 minutes to L shoulder.        Patient Education and Home Exercises     Education provided:   - new HEP exercises, will use [facility] [facility] [employer] for video reference   - Progress towards goals     Written Home Exercises Provided: yes.  Exercises were reviewed and Raffy was able to demonstrate them prior to the end of the session.  Raffy demonstrated fair  understanding of the HEP provided. See EMR under Patient Instructions for exercises provided during therapy sessions.       Assessment     Good spencer to tx today. No pain.      Raffy is progressing well towards his goals and there are no updates to goals at this time. Pt prognosis is Fair.     Pt will continue to benefit from skilled outpatient occupational therapy to address the deficits listed in the problem list on initial evaluation provide pt/family education and to maximize pt's level of independence in the home and community environment.     Pt's spiritual, cultural and educational needs considered and pt agreeable to plan of care and goals.    Anticipated barriers to occupational therapy:     Goals:  Long Term Goals (LTGs); to be met by discharge.  Pt will demo improved  strength by 5 pounds   Pt will demo improved shoulder strengthen by measuring 4/5 MMT   Pt will report pain level no greater than 2/10 during functional daily work and community activities   Pt will report decreased limitation score on FOTO based on predicted value.         Short Term Goals (STGs); to be met within 4 weeks.  Assess  strength   Pt will report pain level no greater than 2-3/10 during light activity   Pt will report/demo compliance with HEP and pain reduction modifications       Plan     Updates/Grading for next session:     Vickie Perkins OT   8/2/2023

## 2023-08-02 NOTE — TELEPHONE ENCOUNTER
Called Pt to let him know his Botox appt will be rescheduled to August 24 at 9:30 am. I was unable to speak with him but left a vm.

## 2023-08-03 ENCOUNTER — PATIENT MESSAGE (OUTPATIENT)
Dept: RADIATION ONCOLOGY | Facility: CLINIC | Age: 73
End: 2023-08-03
Payer: COMMERCIAL

## 2023-08-07 ENCOUNTER — CLINICAL SUPPORT (OUTPATIENT)
Dept: REHABILITATION | Facility: HOSPITAL | Age: 73
End: 2023-08-07
Payer: COMMERCIAL

## 2023-08-07 ENCOUNTER — LAB VISIT (OUTPATIENT)
Dept: LAB | Facility: HOSPITAL | Age: 73
End: 2023-08-07
Attending: INTERNAL MEDICINE
Payer: COMMERCIAL

## 2023-08-07 DIAGNOSIS — R52 PAIN: Primary | ICD-10-CM

## 2023-08-07 DIAGNOSIS — C61 PROSTATE CANCER: ICD-10-CM

## 2023-08-07 LAB — COMPLEXED PSA SERPL-MCNC: 10.7 NG/ML (ref 0–4)

## 2023-08-07 PROCEDURE — 97110 THERAPEUTIC EXERCISES: CPT

## 2023-08-07 PROCEDURE — 36415 COLL VENOUS BLD VENIPUNCTURE: CPT | Performed by: RADIOLOGY

## 2023-08-07 PROCEDURE — 84153 ASSAY OF PSA TOTAL: CPT | Performed by: RADIOLOGY

## 2023-08-09 ENCOUNTER — PATIENT MESSAGE (OUTPATIENT)
Dept: REHABILITATION | Facility: HOSPITAL | Age: 73
End: 2023-08-09
Payer: COMMERCIAL

## 2023-08-09 RX ORDER — ATORVASTATIN CALCIUM 40 MG/1
40 TABLET, FILM COATED ORAL DAILY
Qty: 90 TABLET | Refills: 1 | Status: SHIPPED | OUTPATIENT
Start: 2023-08-09 | End: 2023-08-28 | Stop reason: SDUPTHER

## 2023-08-09 NOTE — TELEPHONE ENCOUNTER
No care due was identified.  [facility] Embedded Care Due Messages. Reference number: [id number].   8/09/2023 10:14:49 AM CDT

## 2023-08-09 NOTE — TELEPHONE ENCOUNTER
Refill Decision Note   Raffy Rutherford  is requesting a refill authorization.  Brief Assessment and Rationale for Refill:  Approve     Medication Therapy Plan:       Medication Reconciliation Completed: No   Comments:     No Care Gaps recommended.     Note composed:5:08 PM 08/09/2023

## 2023-08-10 ENCOUNTER — TELEPHONE (OUTPATIENT)
Dept: PHARMACY | Facility: CLINIC | Age: 73
End: 2023-08-10
Payer: COMMERCIAL

## 2023-08-10 ENCOUNTER — OFFICE VISIT (OUTPATIENT)
Dept: RADIATION ONCOLOGY | Facility: CLINIC | Age: 73
End: 2023-08-10
Payer: COMMERCIAL

## 2023-08-10 VITALS
SYSTOLIC BLOOD PRESSURE: 121 MMHG | HEIGHT: 68 IN | DIASTOLIC BLOOD PRESSURE: 71 MMHG | WEIGHT: 176 LBS | OXYGEN SATURATION: 94 % | HEART RATE: 67 BPM | BODY MASS INDEX: 26.67 KG/M2

## 2023-08-10 DIAGNOSIS — C61 PROSTATE CANCER: Primary | ICD-10-CM

## 2023-08-10 PROCEDURE — 1159F PR MEDICATION LIST DOCUMENTED IN MEDICAL RECORD: ICD-10-PCS | Mod: CPTII,S$GLB,, | Performed by: RADIOLOGY

## 2023-08-10 PROCEDURE — 1125F AMNT PAIN NOTED PAIN PRSNT: CPT | Mod: CPTII,S$GLB,, | Performed by: RADIOLOGY

## 2023-08-10 PROCEDURE — 3078F DIAST BP <80 MM HG: CPT | Mod: CPTII,S$GLB,, | Performed by: RADIOLOGY

## 2023-08-10 PROCEDURE — 3008F PR BODY MASS INDEX (BMI) DOCUMENTED: ICD-10-PCS | Mod: CPTII,S$GLB,, | Performed by: RADIOLOGY

## 2023-08-10 PROCEDURE — 3074F SYST BP LT 130 MM HG: CPT | Mod: CPTII,S$GLB,, | Performed by: RADIOLOGY

## 2023-08-10 PROCEDURE — 99999 PR PBB SHADOW E&M-EST. PATIENT-LVL III: ICD-10-PCS | Mod: PBBFAC,,, | Performed by: RADIOLOGY

## 2023-08-10 PROCEDURE — 1159F MED LIST DOCD IN RCRD: CPT | Mod: CPTII,S$GLB,, | Performed by: RADIOLOGY

## 2023-08-10 PROCEDURE — 1160F RVW MEDS BY RX/DR IN RCRD: CPT | Mod: CPTII,S$GLB,, | Performed by: RADIOLOGY

## 2023-08-10 PROCEDURE — 99212 OFFICE O/P EST SF 10 MIN: CPT | Mod: S$GLB,,, | Performed by: RADIOLOGY

## 2023-08-10 PROCEDURE — 1160F PR REVIEW ALL MEDS BY PRESCRIBER/CLIN PHARMACIST DOCUMENTED: ICD-10-PCS | Mod: CPTII,S$GLB,, | Performed by: RADIOLOGY

## 2023-08-10 PROCEDURE — 1157F ADVNC CARE PLAN IN RCRD: CPT | Mod: CPTII,S$GLB,, | Performed by: RADIOLOGY

## 2023-08-10 PROCEDURE — 1157F PR ADVANCE CARE PLAN OR EQUIV PRESENT IN MEDICAL RECORD: ICD-10-PCS | Mod: CPTII,S$GLB,, | Performed by: RADIOLOGY

## 2023-08-10 PROCEDURE — 3074F PR MOST RECENT SYSTOLIC BLOOD PRESSURE < 130 MM HG: ICD-10-PCS | Mod: CPTII,S$GLB,, | Performed by: RADIOLOGY

## 2023-08-10 PROCEDURE — 1125F PR PAIN SEVERITY QUANTIFIED, PAIN PRESENT: ICD-10-PCS | Mod: CPTII,S$GLB,, | Performed by: RADIOLOGY

## 2023-08-10 PROCEDURE — 3078F PR MOST RECENT DIASTOLIC BLOOD PRESSURE < 80 MM HG: ICD-10-PCS | Mod: CPTII,S$GLB,, | Performed by: RADIOLOGY

## 2023-08-10 PROCEDURE — 99999 PR PBB SHADOW E&M-EST. PATIENT-LVL III: CPT | Mod: PBBFAC,,, | Performed by: RADIOLOGY

## 2023-08-10 PROCEDURE — 99212 PR OFFICE/OUTPT VISIT, EST, LEVL II, 10-19 MIN: ICD-10-PCS | Mod: S$GLB,,, | Performed by: RADIOLOGY

## 2023-08-10 PROCEDURE — 3008F BODY MASS INDEX DOCD: CPT | Mod: CPTII,S$GLB,, | Performed by: RADIOLOGY

## 2023-08-10 NOTE — PROGRESS NOTES
[facility] - Radiation Oncology    Patient ID: Raffy Rutherford Jr. is a 73 y.o. male.    Chief Complaint: Follow-up    This patient presents for follow up visit.     Mr. Rutherford has a history clinical stage IIB (T1c, N0, M0, PSA < 10, GG2) prostate cancer.  He presented for further evaluation after repeat PSA on 5/25/21 returned at 8.2 ng/ml.  MRI on 5/25/21 revealed a 46.7 cc prostate with a 1.3 cm T2 hypointense lesion in the Rt. mid gland, PI-RADS 4.  There was no extraprostatic extension.  The seminal vesicles and neurovascular bundles were unremarkable.  Biopsies on 6/15/21 revealed Daniel 7 (3+4) adenocarcinoma involving 29% of 4 of 6 cores from the targeted lesion in the Rt. apex.  The Daniel pattern 4 accounted for 10 - 20% of the tumor.  There was a small focus of atypical glands at the Lt. apex but the remaining biopsies revealed benign prostatic tissue.  Polaris recurrence score returned at 3.2 which is at the borderline of active surveillance and single modality treatment.   The patient has continued with active surveillance.  Today the patient states he feels well.        Review of Systems   Constitutional:  Negative for activity change, appetite change, chills and fatigue.   Gastrointestinal:  Negative for constipation, diarrhea and fecal incontinence.   Genitourinary:  Positive for frequency. Negative for bladder incontinence, difficulty urinating, dysuria and hematuria.     Physical Exam  Constitutional:       General: He is not in acute distress.     Appearance: Normal appearance.   Neurological:      Mental Status: He is alert.        Latest Reference Range & Units 01/30/23 16:51 04/24/23 14:34 08/07/23 09:39   PSA Diagnostic 0.00 - 4.00 ng/mL 10.2 (H) 10.1 (H) 10.7 (H)   (H): Data is abnormally high     Assessment and Plan   Prostate cancer minimal PSA progression doubling time is 3 + years.  Will plan to repeat MRI scan now.

## 2023-08-14 ENCOUNTER — TELEPHONE (OUTPATIENT)
Dept: ORTHOPEDICS | Facility: CLINIC | Age: 73
End: 2023-08-14
Payer: COMMERCIAL

## 2023-08-14 ENCOUNTER — PATIENT MESSAGE (OUTPATIENT)
Dept: REHABILITATION | Facility: HOSPITAL | Age: 73
End: 2023-08-14
Payer: COMMERCIAL

## 2023-08-14 ENCOUNTER — PROCEDURE VISIT (OUTPATIENT)
Dept: ORTHOPEDICS | Facility: CLINIC | Age: 73
End: 2023-08-14
Payer: COMMERCIAL

## 2023-08-14 VITALS — BODY MASS INDEX: 25.76 KG/M2 | HEIGHT: 68 IN | WEIGHT: 170 LBS

## 2023-08-14 DIAGNOSIS — M72.0 DUPUYTREN'S CONTRACTURE OF RIGHT HAND: Primary | ICD-10-CM

## 2023-08-14 PROCEDURE — 99499 UNLISTED E&M SERVICE: CPT | Mod: S$GLB,,, | Performed by: ORTHOPAEDIC SURGERY

## 2023-08-14 PROCEDURE — 99499 NO LOS: ICD-10-PCS | Mod: S$GLB,,, | Performed by: ORTHOPAEDIC SURGERY

## 2023-08-14 PROCEDURE — 20527 PR INJ DUPUYTREN CORD W/ENZYME: ICD-10-PCS | Mod: RT,S$GLB,, | Performed by: ORTHOPAEDIC SURGERY

## 2023-08-14 PROCEDURE — 20527 NJX NZM PALMAR FASCIAL CORD: CPT | Mod: RT,S$GLB,, | Performed by: ORTHOPAEDIC SURGERY

## 2023-08-14 NOTE — TELEPHONE ENCOUNTER
Calls go straight to   ----- Message from Fish Amaya sent at 8/14/2023  2:39 PM CDT -----  Type:  running late     Who Called: [facility]  Would the patient rather a call back or a response via [facility]? call  Best Call Back Number: [phone]  Additional Information:    Caller stated pt came to the wrong location but he on the way now

## 2023-08-15 ENCOUNTER — PATIENT MESSAGE (OUTPATIENT)
Dept: REHABILITATION | Facility: HOSPITAL | Age: 73
End: 2023-08-15
Payer: COMMERCIAL

## 2023-08-15 ENCOUNTER — PATIENT MESSAGE (OUTPATIENT)
Dept: ORTHOPEDICS | Facility: CLINIC | Age: 73
End: 2023-08-15
Payer: COMMERCIAL

## 2023-08-16 ENCOUNTER — HOSPITAL ENCOUNTER (OUTPATIENT)
Dept: RADIOLOGY | Facility: OTHER | Age: 73
Discharge: HOME | End: 2023-08-16
Attending: NURSE PRACTITIONER
Payer: COMMERCIAL

## 2023-08-16 ENCOUNTER — PROCEDURE VISIT (OUTPATIENT)
Dept: ORTHOPEDICS | Facility: CLINIC | Age: 73
End: 2023-08-16
Payer: COMMERCIAL

## 2023-08-16 ENCOUNTER — PATIENT MESSAGE (OUTPATIENT)
Dept: OTHER | Facility: OTHER | Age: 73
End: 2023-08-16
Payer: COMMERCIAL

## 2023-08-16 ENCOUNTER — CLINICAL SUPPORT (OUTPATIENT)
Dept: REHABILITATION | Facility: HOSPITAL | Age: 73
End: 2023-08-16
Attending: ORTHOPAEDIC SURGERY
Payer: COMMERCIAL

## 2023-08-16 DIAGNOSIS — M25.641 DECREASED RANGE OF MOTION OF FINGER OF RIGHT HAND: ICD-10-CM

## 2023-08-16 DIAGNOSIS — M72.0 DUPUYTREN'S CONTRACTURE OF RIGHT HAND: Primary | ICD-10-CM

## 2023-08-16 DIAGNOSIS — M25.552 LEFT HIP PAIN: Primary | ICD-10-CM

## 2023-08-16 DIAGNOSIS — M25.552 LEFT HIP PAIN: ICD-10-CM

## 2023-08-16 DIAGNOSIS — M72.0 DUPUYTREN'S CONTRACTURE OF RIGHT HAND: ICD-10-CM

## 2023-08-16 PROCEDURE — 72110 X-RAY EXAM L-2 SPINE 4/>VWS: CPT | Mod: TC,FY

## 2023-08-16 PROCEDURE — L3913 HFO W/O JOINTS CF: HCPCS

## 2023-08-16 PROCEDURE — 73502 XR HIP WITH PELVIS WHEN PERFORMED, 2 OR 3 VIEWS LEFT: ICD-10-PCS | Mod: 26,LT,, | Performed by: RADIOLOGY

## 2023-08-16 PROCEDURE — 99499 UNLISTED E&M SERVICE: CPT | Mod: S$GLB,,, | Performed by: ORTHOPAEDIC SURGERY

## 2023-08-16 PROCEDURE — 26341 MANIPULAT PALM CORD POST INJ: CPT | Mod: RT,S$GLB,, | Performed by: ORTHOPAEDIC SURGERY

## 2023-08-16 PROCEDURE — 26341 PR MANIPULAT PALM CORD POST INJ: ICD-10-PCS | Mod: RT,S$GLB,, | Performed by: ORTHOPAEDIC SURGERY

## 2023-08-16 PROCEDURE — 72110 XR LUMBAR SPINE AP AND LAT WITH FLEX/EXT: ICD-10-PCS | Mod: 26,,, | Performed by: RADIOLOGY

## 2023-08-16 PROCEDURE — 99499 NO LOS: ICD-10-PCS | Mod: S$GLB,,, | Performed by: ORTHOPAEDIC SURGERY

## 2023-08-16 PROCEDURE — 97760 ORTHOTIC MGMT&TRAING 1ST ENC: CPT

## 2023-08-16 PROCEDURE — 73502 X-RAY EXAM HIP UNI 2-3 VIEWS: CPT | Mod: 26,LT,, | Performed by: RADIOLOGY

## 2023-08-16 PROCEDURE — 73502 X-RAY EXAM HIP UNI 2-3 VIEWS: CPT | Mod: TC,FY,LT

## 2023-08-16 PROCEDURE — 72110 X-RAY EXAM L-2 SPINE 4/>VWS: CPT | Mod: 26,,, | Performed by: RADIOLOGY

## 2023-08-17 ENCOUNTER — PATIENT MESSAGE (OUTPATIENT)
Dept: ORTHOPEDICS | Facility: CLINIC | Age: 73
End: 2023-08-17
Payer: COMMERCIAL

## 2023-08-17 DIAGNOSIS — M54.50 ACUTE LEFT-SIDED LOW BACK PAIN WITHOUT SCIATICA: Primary | ICD-10-CM

## 2023-08-18 PROBLEM — M25.641 DECREASED RANGE OF MOTION OF FINGER OF RIGHT HAND: Status: ACTIVE | Noted: 2023-08-18

## 2023-08-18 NOTE — PROGRESS NOTES
Occupational Therapy Orthotic Note    Patient: Raffy Rutherford Jr.  MRN: [id number]  Date of visit: 8/16/2023  Referring Physician:  Kaye Solis MD   Treatment Diagnosis:   Encounter Diagnoses   Name Primary?    Dupuytren's contracture of right hand     Decreased range of motion of finger of right hand      Physician Orders:Custom Orthosis   L Code:     Subjective:     Patient's Goals for Therapy: Increased ROM  Pain: 4/10    Objective:     Patient seen by OT this session for fabrication of orthosis per MD orders. Fabricated and applied hand based extension orthotic.     Assessment:     Orthosis with good fit following fabrication, may need to be adjusted as edema subsides. Patient was able to monisha/doff independently.      Patient Education/Response: Orthotic Fit Train x 15 min    Pt. Instructed to wear continuous, remove for bathing or hygiene. Patient/caregiver were provided written instructions on orthosis purpose, wear schedule, care and precautions to monitor for increased pain/edema, pressure points, skin breakdown or redness/skin irritation Patient/caregiver to contact clinic for adjustments as needed.  Patient verbalized understanding and received a copy of orthosis instructions, acknowledging delivery and understanding of wear, care, and precautions. Instructed patient to wear at night time for 4 months for best results.     Plans and Goals:     Goal of Orthosis: After instruction, the patient will demonstrate proper donning/doffing of splint, splint care, precautions, understanding of wearing schedule to insure correct follow through with home care program.   Patient will report wearing splint as instructed.

## 2023-08-22 ENCOUNTER — PATIENT OUTREACH (OUTPATIENT)
Dept: OTHER | Facility: OTHER | Age: 73
End: 2023-08-22
Payer: COMMERCIAL

## 2023-08-23 ENCOUNTER — CLINICAL SUPPORT (OUTPATIENT)
Dept: REHABILITATION | Facility: HOSPITAL | Age: 73
End: 2023-08-23
Payer: COMMERCIAL

## 2023-08-23 ENCOUNTER — PATIENT MESSAGE (OUTPATIENT)
Dept: REHABILITATION | Facility: HOSPITAL | Age: 73
End: 2023-08-23

## 2023-08-23 DIAGNOSIS — R52 PAIN: Primary | ICD-10-CM

## 2023-08-23 PROCEDURE — 97110 THERAPEUTIC EXERCISES: CPT

## 2023-08-23 NOTE — PATIENT INSTRUCTIONS
Access Code: [email or web address]  URL: [email or web address]  Date: 08/23/2023  Prepared by: Vickie Perkins    Exercises  - Seated Scapular Retraction  - 1 x daily - 7 x weekly - 3 sets - 10 reps  - Shoulder External Rotation and Scapular Retraction with Resistance  - 1 x daily - 7 x weekly - 3 sets - 10 reps  - Standing Shoulder Row with Anchored Resistance  - 1 x daily - 7 x weekly - 3 sets - 10 reps  - Shoulder External Rotation with Anchored Resistance  - 1 x daily - 7 x weekly - 3 sets - 10 reps  - Shoulder Internal Rotation with Resistance  - 1 x daily - 7 x weekly - 3 sets - 10 reps  - Shoulder PNF D2 Extension  - 1 x daily - 7 x weekly - 3 sets - 10 reps

## 2023-08-23 NOTE — PROGRESS NOTES
"  [facility]  Occupational Therapy Treatment Note     Date: 8/23/2023  Name: Raffy Rutherford Jr.  Clinic Number: [id number]    Therapy Diagnosis:   Encounter Diagnosis   Name Primary?    Pain Yes         Physician: GEORGIANA Up MD      Physician Orders: Eval and Treat  Medical Diagnosis: M25.512,G89.29 (ICD-10-CM) - Chronic left shoulder pain  Evaluation Date: 7/10/2023  Insurance Authorization Period Expiration: 7/09/2024  Plan of Care Certification Period: 8 weeks; 9/8/2023  Date of Return to MD: TBD  Visit # / Visits authorized: 1 / 1  FOTO: 1/3     Precautions:  Standard     Time In: 10:10 AM   Time Out: 11:00 AM   Total Billable Time: 50 minutes    Subjective     Pt reports: "I have this knot right here."  He was compliant with home exercise program given last session.   Response to previous treatment: good  Functional change: traditional cello       Pain: 2/10  Location: left shoulder      Objective   Observation/Appearance:         Hand ROM. Measured in degrees.    7/10/2023 7/10/2023     Right  Left        Able to make full composite fist         9  Shoulder ROM:   WNL flexion, abduction, ER, IR   - Pain between  abduction   - Pain with end range flexion   - Pain with ER 20-45      Strength (Dyanmometer) and Pinch Strength (Pinch Gauge)  Measured in pounds and psi. Average of three trials.    7/10/2023 7/10/2023     Right  Left    Rung II deferred     Key Pinch       3pt Pinch       2pt Pinch                Intake Outcome Measure for FOTO initial eval;  Survey     Therapist reviewed FOTO scores for Raffy Rutherford Jr. on 7/10/2023.   FOTO documents entered into [employer] - see Media section.     Intake Score:    needs to be taken %          Treatment     Raffy received the treatments listed below:          hot pack for 10 minutes to L shoulder.      therapeutic exercises to develop strength, endurance, and posture for 35 minutes including:  - shoulder rolls " forward/backward x 10 reps each   - scap retraction x 10 reps (3 sets)  - isometric ER/IR with yellow band x 10 reps (2 sets ) each   - concentric ER/IR with yellow band x 10 reps each   - standing PNF D2 x 10 reps   - show me the money with yellow rubberband x 10 reps (2 sets)  - rows with yelow theraband x 10 reps (2 sets)  - CW/CCW ball on wall x 10 each (3 sets) NT   - serratus foam roll x 10 reps on wall NT       manual therapy techniques: Myofacial release were applied to the: L deltoid/bicpeps for 8 minutes, including: NT  Trigger point release   Soft tissue anterior/lateral arm      Patient Education and Home Exercises     Education provided:   - new HEP exercises, see patient instructions   - Progress towards goals     Written Home Exercises Provided: yes.  Exercises were reviewed and Raffy was able to demonstrate them prior to the end of the session.  Raffy demonstrated fair  understanding of the HEP provided. See EMR under Patient Instructions for exercises provided during therapy sessions.       Assessment     Good spencer to tx today. No pain.  Updated HEP     Raffy is progressing well towards his goals and there are no updates to goals at this time. Pt prognosis is Fair.     Pt will continue to benefit from skilled outpatient occupational therapy to address the deficits listed in the problem list on initial evaluation provide pt/family education and to maximize pt's level of independence in the home and community environment.     Pt's spiritual, cultural and educational needs considered and pt agreeable to plan of care and goals.    Anticipated barriers to occupational therapy:         Goals:  Long Term Goals (LTGs); to be met by discharge.  Pt will demo improved  strength by 5 pounds   Pt will demo improved shoulder strengthen by measuring 4/5 MMT   Pt will report pain level no greater than 2/10 during functional daily work and community activities   Pt will report decreased limitation score on  FOTO based on predicted value.         Short Term Goals (STGs); to be met within 4 weeks.  Assess  strength   Pt will report pain level no greater than 2-3/10 during light activity   Pt will report/demo compliance with HEP and pain reduction modifications      Plan     Updates/Grading for next session:     Vickie Perkins OT   8/23/2023

## 2023-08-24 ENCOUNTER — PROCEDURE VISIT (OUTPATIENT)
Dept: NEUROLOGY | Facility: CLINIC | Age: 73
End: 2023-08-24
Payer: COMMERCIAL

## 2023-08-24 ENCOUNTER — PATIENT MESSAGE (OUTPATIENT)
Dept: INTERNAL MEDICINE | Facility: CLINIC | Age: 73
End: 2023-08-24
Payer: COMMERCIAL

## 2023-08-24 VITALS — HEART RATE: 73 BPM | SYSTOLIC BLOOD PRESSURE: 126 MMHG | DIASTOLIC BLOOD PRESSURE: 74 MMHG

## 2023-08-24 DIAGNOSIS — G43.711 CHRONIC MIGRAINE WITHOUT AURA, WITH INTRACTABLE MIGRAINE, SO STATED, WITH STATUS MIGRAINOSUS: ICD-10-CM

## 2023-08-24 DIAGNOSIS — G43.909 MIGRAINE WITHOUT STATUS MIGRAINOSUS, NOT INTRACTABLE, UNSPECIFIED MIGRAINE TYPE: ICD-10-CM

## 2023-08-24 DIAGNOSIS — G43.709 CHRONIC MIGRAINE WITHOUT AURA WITHOUT STATUS MIGRAINOSUS, NOT INTRACTABLE: Primary | ICD-10-CM

## 2023-08-24 DIAGNOSIS — G43.009 MIGRAINE WITHOUT AURA AND WITHOUT STATUS MIGRAINOSUS, NOT INTRACTABLE: ICD-10-CM

## 2023-08-24 PROCEDURE — 64615 PR CHEMODENERVATION OF MUSCLE FOR CHRONIC MIGRAINE: ICD-10-PCS | Mod: S$GLB,,, | Performed by: NURSE PRACTITIONER

## 2023-08-24 PROCEDURE — 99499 UNLISTED E&M SERVICE: CPT | Mod: S$GLB,,, | Performed by: NURSE PRACTITIONER

## 2023-08-24 PROCEDURE — 64615 CHEMODENERV MUSC MIGRAINE: CPT | Mod: S$GLB,,, | Performed by: NURSE PRACTITIONER

## 2023-08-24 PROCEDURE — 99499 NO LOS: ICD-10-PCS | Mod: S$GLB,,, | Performed by: NURSE PRACTITIONER

## 2023-08-24 RX ORDER — FREMANEZUMAB-VFRM 225 MG/1.5ML
225 INJECTION SUBCUTANEOUS
Qty: 1.5 ML | Refills: 5 | Status: SHIPPED | OUTPATIENT
Start: 2023-08-24 | End: 2024-03-06 | Stop reason: SDUPTHER

## 2023-08-24 NOTE — PROCEDURES
SUBJECTIVE:  Patient ID: Raffy Rutherford Jr.  Chief Complaint: Botulinum Toxin Injection and Follow-up    History of Present Illness:  Raffy Rutherford Jr. is a 73 y.o. male who presents to clinic alone for follow-up of headaches and Botox injections.     Recommendations made at last Office Visit on 5/8/23:  - Botox administered in clinic for Chronic Migraine (see below)   - For migraine prevention - continue ajovy 225 mg SQ monthly   - For acute migraine - will retry ubrelvy 100 mg   - Triptans contraindicated given daily MAOI transdermal patch   - For rescue - has fioricet available  - RTC in 12 weeks for repeat Botox injections or sooner if needed     08/24/2023- Interval History:  Migraines continue to be largely well controlled with combination Botox and Ajovy, he is currently three weeks overdue for his Botox and has had more frequent headaches/migraines during this time, indicating to him Botox is effective.  He has not taken any doses fioricet since last visit.  The Botox injections have achieved well over 50%  improvement in the patient's symptoms. Migraines have been reduced at least 7 days per month and the number of cumulative hours suffering with headaches has been reduced at least 100 total hours per month. Today he does have a headache indicating that the Botox has worn off. Frequency of treatment is every 12 weeks unless no response to the treatments, at which time we will discontinue the injections.    Otherwise, information below is still accurate and current.     05/08/2023- Interval History:  Has seen a noticeable improvement in his migraines since starting Botox injections, was down to 1-2 migraines per week, had a few weeks with no migraines.  Migraines were easily managed with over the counter medications.  Began to notice an uptick in migraine frequency 3 weeks ago, sent message via patient portal, was restarted on ajovy 225 mg SQ monthly.  Feels migraines have leveled out since restarting  ajovy.  Denies presence of side effects to Botox, is pleased with the results from first round of Botox, does wish to proceed with second round of Botox as scheduled today.      Otherwise, information below is still accurate and current.      02/13/2023- Interval History:  Headaches continue to occur on a near daily basis with full blown migraines on 12-15 days per month.  He started Ajovy and recently injected third injection, unsure whether or not ajovy was helping.  Seen by outside pain management provider who recommended and prescribed qulipta 10 mg daily, dose increased to 30 mg daily.  For a few days after increasing dose to 30 mg, began to experience issues with insomnia, unsure if this was related to qulipta vs ajovy or combination, but he does feel migraines were better after increasing dose to 30 mg.    Risks, Benefits, and potential side effects of Botox discussed with patient.  Alternative treatments offered.  After thorough discussed regarding the procedure, patient has decided to move forward with initiating Botox injections for Chronic Migraine.  Patient understands we will complete 3 rounds of injections, if after 3 rounds, we do not see a 50% reduction in headaches, we will discontinue Botox injections.      Otherwise, information below is still accurate and current.      History of Present Illness:   72 y.o. male with headaches, chronic LBP, cervical radiculopathy, anxiety, depression, WINNIE, hx of prostate cancer, who presents for evaluation of headaches via virtual visit.  Patient is established with neurology clinic, previous patient of DESTINY Norman and Dr. Millan, he is a new patient to me.    Currently suffering with headaches at least 1-2 times per week each of which last 2-4 days in duration.  Long history of headahces, beginning around 20 yo, had multiple sinus surgeries thinking headaches were sinus related, no improvement.       Headaches are described as a moderate to severe, stabbing/searing pain  beginning left retro-orbitalis, extending towards the occipitalis and into his shoulder/left arm/hand.  Headaches can last anywhere from an hour when successfully treated with fioricet or excedrin, but can last up to 4 days in duration, on average headaches are lasting about a day in duration.  Headaches are often present upon waking.   Associated symptoms include fatigue, crepitus in left shoulder, congested (on left side), more trouble concentrating than norm, photophobia, phonophobia.  Currently experiencing some sort of headache on 20-25/30 days, with migraines occurring on 12-15/30 days.    At last visit with DESTINY Norman, she restarted nurtec 75 mg odt and referred him to me for consideration of Botox. Nurtec every other day was ineffective (though was effective in the past for him).  He was then switched to ajovy 225 mg SQ monthly, was waiting for today's visit to decide whether or not to start using ajovy.   Takes lyrica as prescribed by outside pain management, for chronic back pain.   He is prescribed lamotrigine 200 mg daily for anxiety/depression.  Takes trazodone 50 mg nightly for insomnia.      Notes from DESTINY Norman:  HPI:   Mr. Raffy Rutherford Jr. is a 72 y.o. male presenting for evaluation regarding chronic low back pain. On chart review he was previously seen by Dr. Millan for both bilateral neuropathy and headache pain. He received an EMG with Dr. Millan on 03/15/2022 which showed evidence of L5/S1 radiculopahties, worse on the right; and moderate chronic reinnervation changes noted in the R vastus medialis. His current therapy regimen is Lyrica 200mg QHS. He notes he was previously seen by neurosurgery and instructed to receive an MRI lumbar spine by Dr. Caldwell but was unsure why this was ordered. We discussed that this is likely due to the evidence on the EMG indicating his bilateral foot drop may be due to a lumbar spine issue at L5/S1 level.      Headache History:   Onset- Teenager   Frequency- 5-6 per  week  Duration- All day   Location- L eye and radiates to his L jaw and then toward his L shoulder and arm  Associated symptoms- Nasal blockage on the L side, + photophobia,   Denies any aura, denies nausea/vomiting   Alleviating Factors- Half Fioricet can occasionally relieve his headaches  Aggravating Factors- Smells trigger his headaches and stress/ poor sleep, weather      Of note- patient went to [place] and found in the 3 weeks he was there he only had 2-3 headaches which were easily relieved     He states that he is also concerned regarding his headaches for which he is currently taking Ubrelvy QOD and Fioricet for abortive therapy.       Headache:  Type: iron hot stab like pain   Severity: 9/10  Location: left eye, forehead , cheek into left shoulder and into the back   Triggers: Particular scents, looking at screen for a long time   Frequency: worse in the last week, but otherwise 8-9/month   Duration:  Days if he does not take medication (2-3 days) can have a cycles of headaches for several weeks , sometimes can go 4 weeks without headache   Aura: none   Photophobia: not as much but does endorse turning the lights off or worsening of headache when he looks at his phone  Phonophobia: ++  Nausea/ vomiting: rarely gets nauseous   Aggravating factors: none   Relieving factors: Medications  Inhales an oil- mydab which has given him significant benefit  Fioricet- none   Ubrelvy- worked okay but Nurtec works better  Voltaren- he takes this for joint pains.   Excedrin- none   Aspirin makes tinnitus worse   Norco- 3 times a day - 1/2 pill Norco in am , 1/2 pill in the afternoon 1/2 + 3/4th pill in the evening. Pain worsens when he tries to taper off his pain medication  He snores at night, he has sleep apnea, he was on CPAP but not very compliant because he states he took the mask off in his sleep. He has tried different masks with no benefit.      Treatments Tried and Response  Nurtec  lyrica  Atogepant  Baclofen    Fioricet  Celebrex  Emgality 09/08/2020 failed  Aimovig  Fioricet   Diamox   Cymbalta   Klonopin   Valium   [place]  Lamictal   Gabapentin   Indometacin   Dilaudid  Verapamil  Nurtec   Ubrelvy   Trazodone  Ajovy   Botox - helping after first round  Qulipta 60 mg - no, caused insomnia   Ajovy - helping     Current Medications:    Current Outpatient Medications:     atorvastatin (LIPITOR) 40 MG tablet, Take 1 tablet (40 mg total) by mouth once daily., Disp: 90 tablet, Rfl: 1    butalbital-acetaminophen-caffeine -40 mg (FIORICET, ESGIC) -40 mg per tablet, Take 1 tablet by mouth daily as needed for 30 days., Disp: 30 tablet, Rfl: 0    celecoxib (CELEBREX) 200 MG capsule, Take 1 capsule (200 mg total) by mouth 2 (two) times daily., Disp: 30 capsule, Rfl: 2    clindamycin (CLEOCIN) 150 MG capsule, Take 4 capsules (600 mg total) by mouth 1 hour prior to dental appointment., Disp: 12 capsule, Rfl: 1    clonazePAM (KLONOPIN) 0.5 MG tablet, Take 1 tablet by mouth twice a day as needed for anxiety, Disp: 60 tablet, Rfl: 5    ergocalciferol (VITAMIN D2) 50,000 unit Cap, Take 1 capsule (50,000 Units total) by mouth every 7 days., Disp: 12 capsule, Rfl: 3    HYDROcodone-acetaminophen (NORCO) 5-325 mg per tablet, Take 1 tablet by mouth 4 (four) times daily as needed., Disp: 120 tablet, Rfl: 0    lamoTRIgine (LAMICTAL) 200 MG tablet, Take 1 tablet (200 mg total) by mouth once daily., Disp: 90 tablet, Rfl: 3    pregabalin (LYRICA) 25 MG capsule, Take 2 capsules (50 mg total) by mouth once daily., Disp: 90 capsule, Rfl: 1    pregabalin (LYRICA) 75 MG capsule, Take 3 capsules (225 mg total) by mouth every evening., Disp: 270 capsule, Rfl: 0    RABEprazole (ACIPHEX) 20 mg tablet, Take 1 tablet (20 mg total) by mouth 2 (two) times daily., Disp: 180 tablet, Rfl: 3    selegiline (EMSAM) 9 mg/24 hr, Place 1 patch onto the skin once daily., Disp: 30 patch, Rfl: 11    sildenafiL (VIAGRA) 100 MG tablet, Take 1 tablet (100 mg  total) by mouth as needed for Erectile Dysfunction (take 30-60 minutes before on empty stomach). Take one hour before., Disp: 4 tablet, Rfl: 12    tadalafiL (CIALIS) 10 MG tablet, Take 1 tablet (10 mg total) by mouth daily as needed for Erectile Dysfunction., Disp: 30 tablet, Rfl: 11    traZODone (DESYREL) 50 MG tablet, Take 1 tablet (50 mg total) by mouth every evening., Disp: 90 tablet, Rfl: 1    ubrogepant (UBRELVY) 100 mg tablet, Take 1 tablet (100 mg total) by mouth every 2 (two) hours as needed for Migraine. Max 200 mg/day., Disp: 16 tablet, Rfl: 2    varenicline (TYRVAYA) 0.03 mg/spray sprm, 1 spray by Each Nostril route 2 (two) times a day., Disp: 8.4 mL, Rfl: 11    fremanezumab-vfrm (AJOVY SYRINGE) 225 mg/1.5 mL injection, Inject 1.5 mLs (225 mg total) into the skin every 28 days., Disp: 1.5 mL, Rfl: 5    pregabalin (LYRICA) 25 MG capsule, Take 1 capsule (25 mg total) by mouth 2 (two) times a day., Disp: 180 capsule, Rfl: 0    Current Facility-Administered Medications:     onabotulinumtoxina injection 200 Units, 200 Units, Intramuscular, q12 weeks, Macie Pearson, FNP, 200 Units at 08/24/23 1138    Review of Systems - A review of 10+ systems was conducted with pertinent positive and negative findings documented in HPI with all other systems reviewed and negative.    PFSH: Past medical, family, and social history reviewed as documented in chart with pertinent positive medical, family, and social history detailed in HPI.    OBJECTIVE:  Vitals:  /74   Pulse 73     Physical Exam:  Constitutional: he appears well-developed and well-nourished. he is well groomed. NAD     Review of Data:   Notes from Neurology reviewed.   Labs:  Lab Visit on 08/07/2023   Component Date Value Ref Range Status    PSA Diagnostic 08/07/2023 10.7 (H)  0.00 - 4.00 ng/mL Final   Lab Visit on 06/22/2023   Component Date Value Ref Range Status    WBC 06/22/2023 3.23 (L)  3.90 - 12.70 K/uL Final    RBC 06/22/2023 5.09  4.60 -  6.20 M/uL Final    Hemoglobin 06/22/2023 15.7  14.0 - 18.0 g/dL Final    Hematocrit 06/22/2023 46.3  40.0 - 54.0 % Final    MCV 06/22/2023 91  82 - 98 fL Final    MCH 06/22/2023 30.8  27.0 - 31.0 pg Final    MCHC 06/22/2023 33.9  32.0 - 36.0 g/dL Final    RDW 06/22/2023 11.9  11.5 - 14.5 % Final    Platelets 06/22/2023 190  150 - 450 K/uL Final    MPV 06/22/2023 9.6  9.2 - 12.9 fL Final    Immature Granulocytes 06/22/2023 0.6 (H)  0.0 - 0.5 % Final    Gran # (ANC) 06/22/2023 1.1 (L)  1.8 - 7.7 K/uL Final    Immature Grans (Abs) 06/22/2023 0.02  0.00 - 0.04 K/uL Final    Lymph # 06/22/2023 1.6  1.0 - 4.8 K/uL Final    Mono # 06/22/2023 0.4  0.3 - 1.0 K/uL Final    Eos # 06/22/2023 0.1  0.0 - 0.5 K/uL Final    Baso # 06/22/2023 0.02  0.00 - 0.20 K/uL Final    nRBC 06/22/2023 0  0 /100 WBC Final    Gran % 06/22/2023 34.4 (L)  38.0 - 73.0 % Final    Lymph % 06/22/2023 48.6 (H)  18.0 - 48.0 % Final    Mono % 06/22/2023 12.4  4.0 - 15.0 % Final    Eosinophil % 06/22/2023 3.4  0.0 - 8.0 % Final    Basophil % 06/22/2023 0.6  0.0 - 1.9 % Final    Platelet Estimate 06/22/2023 Appears normal   Final    Differential Method 06/22/2023 Automated   Final    Sodium 06/22/2023 136  136 - 145 mmol/L Final    Potassium 06/22/2023 3.5  3.5 - 5.1 mmol/L Final    Chloride 06/22/2023 100  95 - 110 mmol/L Final    CO2 06/22/2023 27  23 - 29 mmol/L Final    Glucose 06/22/2023 105  70 - 110 mg/dL Final    BUN 06/22/2023 7 (L)  8 - 23 mg/dL Final    Creatinine 06/22/2023 0.7  0.5 - 1.4 mg/dL Final    Calcium 06/22/2023 9.3  8.7 - 10.5 mg/dL Final    Total Protein 06/22/2023 7.1  6.0 - 8.4 g/dL Final    Albumin 06/22/2023 3.8  3.5 - 5.2 g/dL Final    Total Bilirubin 06/22/2023 0.4  0.1 - 1.0 mg/dL Final    Alkaline Phosphatase 06/22/2023 41 (L)  55 - 135 U/L Final    AST 06/22/2023 58 (H)  10 - 40 U/L Final    ALT 06/22/2023 107 (H)  10 - 44 U/L Final    eGFR 06/22/2023 >60.0  >60 mL/min/1.73 m^2 Final    Anion Gap 06/22/2023 9  8 - 16 mmol/L  Final   Lab Visit on 06/12/2023   Component Date Value Ref Range Status    Arsenic 06/12/2023 <1  <13 ng/mL Final    Lead 06/12/2023 1.2  <5.0 mcg/dL Final    Cadmium 06/12/2023 0.3  <5.0 ng/mL Final    Mercury 06/12/2023 <1  <10 ng/mL Final    Venous/Capillary 06/12/2023 Test Not Performed   Final    Street Address 06/12/2023 Test Not Performed   Final    City 06/12/2023 Test Not Performed   Final    State 06/12/2023 Test Not Performed   Final    Zip 06/12/2023 Test Not Performed   Final    County 06/12/2023 Test Not Performed   Final    Guardian First Name 06/12/2023 Test Not Performed   Final    Guardian Last Name 06/12/2023 Test Not Performed   Final    Home Phone 06/12/2023 Test Not Performed   Final    Race 06/12/2023 Test Not Performed   Final    ROBERTO 06/12/2023 Negative <1:80  Negative <1:80 Final    Cytoplasmic Neutrophilic Ab 06/12/2023 <1:20  <1:20 Titer Final    Perinuclear (P-ANCA) 06/12/2023 <1:20  <1:20 Titer Final    Rheumatoid Factor 06/12/2023 <13.0  0.0 - 15.0 IU/mL Final   Lab Visit on 04/24/2023   Component Date Value Ref Range Status    PSA Diagnostic 04/24/2023 10.1 (H)  0.00 - 4.00 ng/mL Final     Imaging:  No results found. However, due to the size of the patient record, not all encounters were searched. Please check Results Review for a complete set of results.    Note: I have independently reviewed any/all imaging/labs/tests and agree with the report (s) as documented.  Any discrepancies will be as noted/demarcated by free text.  GARO BERGER 8/24/2023    ASSESSMENT:  1. Chronic migraine without aura without status migrainosus, not intractable    2. Migraine without status migrainosus, not intractable, unspecified migraine type      PLAN:  - Botox administered in clinic for Chronic Migraine (see below)   - For migraine prevention - continue ajovy 225 mg SQ monthly   - For acute migraine - will retry ubrelvy 100 mg   - Triptans contraindicated given daily MAOI transdermal patch   - For rescue  - has fioricet available  - RTC in 12 weeks for repeat Botox injections or sooner if needed     Orders Placed This Encounter    fremanezumab-vfrm (AJOVY SYRINGE) 225 mg/1.5 mL injection       All questions and concerns addressed.  Patient verbalizes understanding and is agreeable with the above stated treatment plan.      PROCEDURE NOTE:  BOTOX was performed as an indicated therapy for intractable chronic migraine headaches given that the patient failed more than 2 headache medications    A time out was conducted just before the start of the procedure to verify the correct patient and procedure, procedure location, and all relevant critical information.     The Botox injections have achieved well over 50%  improvement in the patient's symptoms. Migraines have been reduced at least 7 days per month and the number of cumulative hours suffering with headaches has been reduced at least 100 total hours per month. Today she does have a headache indicating that the Botox has worn off. Frequency of treatment is every 12 weeks unless no response to the treatments, at which time we will discontinue the injections.    PROCEDURE PERFORMED: Botulinum toxin injection (14171)  CLINICAL INDICATION: G43.719  After risks and benefits were explained including bleeding, infection, worsening of pain, damage to the areas being injected, weakness of muscles, loss of muscle control, dysphagia if injecting the head or neck, facial droop if injecting the facial area, painful injection, allergic or other reaction to the medications being injected, and the failure of the procedure to help the problem, a signed consent was obtained.   The patient was placed in a comfortable area and the sites to be treated were identified.The area to be treated was prepped three times with alcohol and the alcohol allowed to dry. Next, a 30 gauge needle was used to inject the medication in the area to be treated.      Total Botox used: 155 Units   Unavoidable  waste: 45 Units     Injection sites:    muscle bilaterally ( a total of 10 units divided into 2 sites)   Procerus muscle (5 units)   Frontalis muscle bilaterally (a total of 20 units divided into 4 sites)   Temporalis muscle bilaterally (a total of 40 units divided into 8 sites)   Occipitalis muscle bilaterally (a total of 30 units divided into 6 sites)   Cervical paraspinal muscles (a total of 20 units divided into 4 sites)   Trapezius muscle bilaterally (a total of 30 units divided into 6 sites)   Complications: none   RTC for the next Botox injection: 12 weeks     CC: Haseeb Puentes MD Elizabeth C Vulevich, FNP-C  [facility]   [phone]

## 2023-08-24 NOTE — PROGRESS NOTES
CC: Left shoulder pain    Raffy returns today for follow up of left shoulder pain. Received CSI at previous appointment, reports 100 % relief for 5-6 weeks.  Overall much better after this injection but does have now some recurrent pain and somewhat different location.  Localizes anteriorly over the proximal biceps groove.  This does bother him with activity.  He would like to discuss treatment options for this.  Going to PT with Ronald Zurita for his back and hip and OT with Vickie Perkins.  Under the care of my colleague Dr. Kaye oSlis for his right hand.    Prior hx 7/11/23:  Raffy returns to to discuss a possible left shoulder repeat steroid injection.  I saw him for the 1st time virtually recently.  MRI results reviewed.  He has low-grade undersurface cuff tearing with some degenerative changes within the glenohumeral joint and SLAP tearing/biceps tendinopathy.  Complaining of chronic left shoulder pain. He has been seen by multiple mid-level providers over in the General Orthopedic Department.  He was given a subacromial steroid injection in been March which provided approximately 70% relief in pain for a few weeks.  Additionally he has had physical therapy at the [facility] location with Ronald Zurita.  Has taken Celebrex and Mobic a different times.  Chief complaint of ongoing left shoulder pain worse with overhead activity and after playing cello.  Today he localizes over his medial upper arm, occasionally superolateral with provocative movements.  He describes intermittent clicking and catching in the shoulder which is deep.  This is bothersome.  Denies any antecedent injury mechanism.      PMHx notable for cervical and lumbar spine DJD, bilateral foot drop  PSHx notable for left SHARAD 2012, L3-5 fusion 2015, L5-S1 fusion 2020  Negative for tobacco.   Negative for diabetes.    Pain Score:   4    PAST MEDICAL HISTORY:   Past Medical History:   Diagnosis Date    Allergy     Arthritis     Back pain     after  trauma beginning in 195    Cataract     Chronic pain     neck and left shoulder    Cluster headache 2013    Colon polyps 2007 2007-2019: TA x5, HP x3    Degenerative disc disease     Depression     Diverticulitis 12/2013    Dry eye syndrome     Fibromyalgia 2013    GERD (gastroesophageal reflux disease)     Hepatitis 1970's    A    History of prostate biopsy 2002    Hyperlipidemia     Joint pain     Prostate cancer 07/2021    PVD (posterior vitreous detachment)     Salzmann's nodular dystrophy of left eye     Sleep apnea     Thyroid nodule 07/16/2014    Trigeminal neuralgia of left side of face     Tubular adenoma of colon 2007    5 removed [id number]    Visual disturbance 2012    problems after cataract surgery     PAST SURGICAL HISTORY:  Past Surgical History:   Procedure Laterality Date    BACK SURGERY      CARPAL TUNNEL RELEASE Right 5/18/2021    Procedure: RELEASE, CARPAL TUNNEL;  Surgeon: Kaye Solis MD;  Location: [facility];  Service: Orthopedics;  Laterality: Right;    CATARACT EXTRACTION W/  INTRAOCULAR LENS IMPLANT Bilateral     CHOLECYSTECTOMY      COLONOSCOPY N/A 12/17/2019    Normal - Repeat 5yrs    COLONOSCOPY  2007 2007 TA x2, 2011 TA x3, 2014 HP x3, 2019 normal    COSMETIC SURGERY  2/10/2015    Direct mid-forehead brow lift    COSMETIC SURGERY  2/10/2015    Bilateral upper lid blepahroplasty    CYSTOSCOPY WITH URETEROSCOPY, RETROGRADE PYELOGRAPHY, AND INSERTION OF STENT Left 8/19/2020    Procedure: CYSTOSCOPY, WITH RETROGRADE PYELOGRAM AND URETERAL STENT INSERTION;  Surgeon: Katelynn George MD;  Location: [facility];  Service: Urology;  Laterality: Left;    ESOPHAGOGASTRODUODENOSCOPY N/A 12/17/2019    Procedure: ESOPHAGOGASTRODUODENOSCOPY (EGD);  Surgeon: Amadou Hardin MD;  Location: [facility] ([address] [place] [address]);  Service: Endoscopy;  Laterality: N/A;    EYE SURGERY      FUSION OF LUMBAR SPINE BY ANTERIOR APPROACH N/A 8/20/2020    Procedure: FUSION, SPINE, LUMBAR,  ANTERIOR APPROACH L5-S1 ALIF Stand Alone;  Surgeon: Jason Caldwell MD;  Location: [facility] OR;  Service: Neurosurgery;  Laterality: N/A;    HEMORRHOID SURGERY      with complication of chronic bleeding for 6 weeks     INJECTION OF STEROID Right 12/6/2018    Procedure: INJECTION, STEROID Right SI Joint Block and Steroid Injection;  Surgeon: Jason Caldwell MD;  Location: [facility] OR;  Service: Neurosurgery;  Laterality: Right;  Procedure: Right SI Joint Block and Steroid Injection  Surgery Time: 30 MIN  LOS: 0  Anesthesia: MAC  Radiology: C-arm  Bed: Favian 4 Poster  Position: Prone    INJECTION OF STEROID Right 9/19/2019    Procedure: INJECTION, STEROID Procedure: Right SI joint block nd steroid injection;  Surgeon: Jason Caldwell MD;  Location: [facility] OR;  Service: Neurosurgery;  Laterality: Right;  Procedure: Right SI joint block nd steroid injection  Surgery Time: 30 mins  LOS:   Anesthesia: General MAC  Radiology:C-arm  Bed: Regular Bed  Position: Prone    IRRIGATION AND DEBRIDEMENT OF UPPER EXTREMITY Left 4/7/2022    Procedure: IRRIGATION AND DEBRIDEMENT, UPPER EXTREMITY;  Surgeon: Kaye Solis MD;  Location: [facility] OR;  Service: Orthopedics;  Laterality: Left;    JOINT REPLACEMENT      KNEE SURGERY      involving arthroscopic surgery to both knees    REPAIR OF EXTENSOR TENDON Left 4/7/2022    Procedure: REPAIR, TENDON, EXTENSOR thumb, EPB and EPL;  Surgeon: Kaye Solis MD;  Location: [facility] OR;  Service: Orthopedics;  Laterality: Left;    SINUS SURGERY      left molar and sinus surgery for trigeminal neuralgia    SPINAL FUSION  06/22/2015    L3-L5 XLIF/TANA    TOTAL HIP ARTHROPLASTY  4/2012    Pt states he had total hip replacement on his left hip.    ULNAR NERVE TRANSPOSITION Left 12/16/2020    Procedure: TRANSPOSITION, NERVE, ULNAR - left carpal and cubital tunnel releases;  Surgeon: Addi Bauer MD;  Location: [facility];  Service: Orthopedics;  Laterality: Left;     FAMILY HISTORY:  Family  History   Problem Relation Age of Onset    Stroke Mother 86    Colon cancer Mother         early/mid-60s    Uterine cancer Mother 77    Pancreatitis Brother         acute, now s/p nathalia    Diabetes Brother     Macular degeneration Brother     Other Brother         foot drop    Other Father 44        pituitary tumor- CA vs benign?    Heart attack Maternal Grandfather         suspected, 50s    Migraines Sister     Crohn's disease Sister         w/ assoc joint issues    Arthritis Sister     Tremor Daughter     Irritable bowel syndrome Son         leaning toward Crohn's dx    Neurological Disorders Son         nonspecific, benign fasciculations of calves    Tremor Son     Jaundice Grandchild     Other Maternal Uncle         memory issue    Other Maternal Uncle         memory issue    Cancer Maternal Cousin         origin? maybe thyroid? 40s?    Melanoma Neg Hx     Amblyopia Neg Hx     Blindness Neg Hx     Cataracts Neg Hx     Glaucoma Neg Hx     Hypertension Neg Hx     Retinal detachment Neg Hx     Strabismus Neg Hx     Thyroid disease Neg Hx     Allergic rhinitis Neg Hx     Allergies Neg Hx     Angioedema Neg Hx     Asthma Neg Hx     Eczema Neg Hx     Urticaria Neg Hx     Rhinitis Neg Hx     Immunodeficiency Neg Hx     Atopy Neg Hx      MEDICATIONS:    Current Outpatient Medications:     atorvastatin (LIPITOR) 40 MG tablet, Take 1 tablet (40 mg total) by mouth once daily., Disp: 90 tablet, Rfl: 1    butalbital-acetaminophen-caffeine -40 mg (FIORICET, ESGIC) -40 mg per tablet, Take 1 tablet by mouth daily as needed for 30 days., Disp: 30 tablet, Rfl: 0    celecoxib (CELEBREX) 100 MG capsule, Take 2 capsules (200 mg total) by mouth 2 (two) times daily., Disp: 180 capsule, Rfl: 3    clindamycin (CLEOCIN) 150 MG capsule, Take 4 capsules (600 mg total) by mouth 1 hour prior to dental appointment., Disp: 12 capsule, Rfl: 1    clonazePAM (KLONOPIN) 0.5 MG tablet, Take 1  tablet by mouth twice a day as needed for anxiety, Disp: 60 tablet, Rfl: 5    ergocalciferol (VITAMIN D2) 50,000 unit Cap, Take 1 capsule (50,000 Units total) by mouth every 7 days., Disp: 12 capsule, Rfl: 3    fremanezumab-vfrm (AJOVY SYRINGE) 225 mg/1.5 mL injection, Inject 1.5 mLs (225 mg total) into the skin every 28 days., Disp: 1.5 mL, Rfl: 5    HYDROcodone-acetaminophen (NORCO) 5-325 mg per tablet, Take 1 tablet by mouth every 8 (eight) hours as needed for Pain., Disp: 90 tablet, Rfl: 0    lamoTRIgine (LAMICTAL) 200 MG tablet, Take 1 tablet (200 mg total) by mouth once daily., Disp: 90 tablet, Rfl: 3    pregabalin (LYRICA) 225 MG Cap, Take 1 capsule (225 mg total) by mouth every evening., Disp: 93 capsule, Rfl: 3    pregabalin (LYRICA) 25 MG capsule, Take 2 capsules (50 mg total) by mouth once daily., Disp: 90 capsule, Rfl: 1    pregabalin (LYRICA) 75 MG capsule, Take 3 capsules by mouth at night, Disp: 90 capsule, Rfl: 1    RABEprazole (ACIPHEX) 20 mg tablet, Take 1 tablet (20 mg total) by mouth 2 (two) times daily., Disp: 180 tablet, Rfl: 3    selegiline (EMSAM) 9 mg/24 hr, Place 1 patch onto the skin once daily., Disp: 30 patch, Rfl: 11    sildenafiL (VIAGRA) 100 MG tablet, Take 1 tablet (100 mg total) by mouth as needed for Erectile Dysfunction (take 30-60 minutes before on empty stomach). Take one hour before., Disp: 4 tablet, Rfl: 12    tadalafiL (CIALIS) 10 MG tablet, Take 1 tablet (10 mg total) by mouth daily as needed for Erectile Dysfunction., Disp: 30 tablet, Rfl: 11    traZODone (DESYREL) 50 MG tablet, Take 1 tablet (50 mg total) by mouth every evening., Disp: 90 tablet, Rfl: 1    ubrogepant (UBRELVY) 100 mg tablet, Take 1 tablet (100 mg total) by mouth every 2 (two) hours as needed for Migraine. Max 200 mg/day., Disp: 16 tablet, Rfl: 2    varenicline (TYRVAYA) 0.03 mg/spray sprm, 1 spray by Each Nostril route 2 (two) times a day., Disp: 8.4 mL, Rfl: 11    pregabalin (LYRICA) 25 MG  "capsule, Take 1 capsule (25 mg total) by mouth 2 (two) times a day. (Patient not taking: Reported on 8/29/2023), Disp: 180 capsule, Rfl: 0    Current Facility-Administered Medications:     onabotulinumtoxina injection 200 Units, 200 Units, Intramuscular, q12 weeks, Macie Pearson FNP, 200 Units at 08/24/23 1138    ALLERGIES:  Review of patient's allergies indicates:   Allergen Reactions    Alphagan [brimonidine]      Patient taking MASO-B Selective Inhibitor Selegiline (Emsam)    Coumadin [warfarin]      itch    Oxycodone      hiccups     REVIEW OF SYSTEMS:  Constitution: Negative. Negative for chills, fever and night sweats.    Hematologic/Lymphatic: Negative for bleeding problem. Does not bruise/bleed easily.   Skin: Negative for dry skin, itching and rash.   Musculoskeletal: Negative for falls. Positive for left shoulder pain and muscle weakness.     All other review of symptoms were reviewed and found to be noncontributory.    PHYSICAL EXAMINATION:  Vitals:  /72   Pulse 65   Ht 5' 8" (1.727 m)   Wt 81 kg (178 lb 9.2 oz)   BMI 27.15 kg/m²    General: Well-developed well-nourished 73 y.o. malein no acute distress   Cardiovascular: Regular rhythm by palpation of distal pulse, normal color and temperature, no concerning varicosities on symptomatic side   Lungs: No labored breathing or wheezing appreciated   Neuro: Alert and oriented ×3   Psychiatric: well oriented to person, place and time, demonstrates normal mood and affect   Skin: No rashes, lesions or ulcers, normal temperature, turgor, and texture on uninvolved extremity    Ortho/SPM Exam  Examination of the left shoulder demonstrates active forward elevation to 160, ER with arm at side to 60, IR to T10.  Intact overhead passive range of motion.  Prominent pain over the proximal biceps groove.  Positive modified speed's test.  Minimal tenderness to palpation over the posterior glenohumeral joint line.  4+ out of 5 resisted scaption.  Mildly " positive glenohumeral grind test.    IMAGING:  Prior Xrays including AP, Outlet and Axillary Lateral of Left shoulder are ordered / images reviewed by me:   Mild DJD.    MRI left shoulder:  1. Supraspinatus tendinosis with articular surface fraying and a small, low-grade partial-thickness interstitial/concealed footprint tear.  2. Infraspinatus and subscapularis tendinosis.  3. SLAP tear that involves the biceps anchor.  4. Biceps tendinosis with partial-thickness interstitial tearing.  5. Mild glenohumeral osteoarthritis.  6. Subacromial/subdeltoid bursitis.  7. AC joint arthrosis.    ASSESSMENT:      ICD-10-CM ICD-9-CM   1. Biceps tendinopathy, left  M67.922 727.9   2. Superior glenoid labrum lesion of left shoulder, initial encounter  S43.432A 840.7   3. Chondromalacia of left shoulder  M94.212 733.92   4. Incomplete tear of left rotator cuff, unspecified whether traumatic  M75.112 840.4   5. Chronic left shoulder pain  M25.512 719.41    G89.29 338.29       PLAN:   Patient has known biceps tendinopathy with a partial-thickness interstitial tearing along with SLAP pathology.  I believe this is his primary pain generator.  We discussed both operative and nonoperative treatment options for this.  He would like to maximize all conservative care.  I am okay with that.  -L shoulder biceps sheath CSI under ultrasound guidance  -PT order for Synergy placed  -F/u prn.  If pain is persistent or recurrent despite these measures, we can consider arthroscopy for extensive debridement and biceps tenodesis    Tendon Sheath    Date/Time: 8/29/2023 9:00 AM    Performed by: GEORGIANA Up MD  Authorized by: GEORGIANA Up MD    Consent Done?:  Yes (Verbal)  Indications:  Pain  Site marked: the procedure site was marked    Timeout: prior to procedure the correct patient, procedure, and site was verified    Prep: patient was prepped and draped in usual sterile fashion      Location:  Shoulder  Site:  L bicep  tendon  Ultrasonic guidance for needle placement?: Yes    Needle size:  22 G  Medications:  40 mg triamcinolone acetonide 40 mg/mL  Patient tolerance:  Patient tolerated the procedure well with no immediate complications    Additional Comments: The ultrasound was used to gain visualization over the proximal biceps groove.  A representative picture was taken and saved showing the biceps groove and anatomy along with the needle introduced into the biceps sheath for the steroid injection.

## 2023-08-25 ENCOUNTER — PATIENT MESSAGE (OUTPATIENT)
Dept: REHABILITATION | Facility: HOSPITAL | Age: 73
End: 2023-08-25

## 2023-08-25 ENCOUNTER — LAB VISIT (OUTPATIENT)
Dept: LAB | Facility: HOSPITAL | Age: 73
End: 2023-08-25
Attending: RADIOLOGY
Payer: COMMERCIAL

## 2023-08-25 ENCOUNTER — CLINICAL SUPPORT (OUTPATIENT)
Dept: REHABILITATION | Facility: HOSPITAL | Age: 73
End: 2023-08-25
Payer: COMMERCIAL

## 2023-08-25 DIAGNOSIS — R52 PAIN: Primary | ICD-10-CM

## 2023-08-25 DIAGNOSIS — C61 PROSTATE CANCER: ICD-10-CM

## 2023-08-25 LAB — COMPLEXED PSA SERPL-MCNC: 11.1 NG/ML (ref 0–4)

## 2023-08-25 PROCEDURE — 36415 COLL VENOUS BLD VENIPUNCTURE: CPT | Performed by: RADIOLOGY

## 2023-08-25 PROCEDURE — 97110 THERAPEUTIC EXERCISES: CPT

## 2023-08-25 PROCEDURE — 84153 ASSAY OF PSA TOTAL: CPT | Performed by: RADIOLOGY

## 2023-08-25 NOTE — PROGRESS NOTES
"  [facility]  Occupational Therapy Treatment Note     Date: 8/25/2023  Name: Raffy Rutherford Jr.  Clinic Number: [id number]    Therapy Diagnosis:   Encounter Diagnosis   Name Primary?    Pain Yes           Physician: GEORGIANA Up MD      Physician Orders: Eval and Treat  Medical Diagnosis: M25.512,G89.29 (ICD-10-CM) - Chronic left shoulder pain  Evaluation Date: 7/10/2023  Insurance Authorization Period Expiration: 7/09/2024  Plan of Care Certification Period: 8 weeks; 9/8/2023  Date of Return to MD: TBD  Visit # / Visits authorized: 5 / 1  FOTO: 1/3       Precautions:  Standard           Time In: 10:10 AM   Time Out: 11:00 AM   Total Billable Time: 50 minutes        Subjective       Pt reports: "I have this knot right here."  He was compliant with home exercise program given last session.   Response to previous treatment: good  Functional change: traditional cello       Pain: 2/10  Location: left shoulder      Objective   Observation/Appearance:         Hand ROM. Measured in degrees.    7/10/2023 7/10/2023     Right  Left        Able to make full composite fist         9  Shoulder ROM:   WNL flexion, abduction, ER, IR   - Pain between  abduction   - Pain with end range flexion   - Pain with ER 20-45        Strength (Dyanmometer) and Pinch Strength (Pinch Gauge)  Measured in pounds and psi. Average of three trials.    7/10/2023 7/10/2023     Right  Left    Rung II deferred     Key Pinch       3pt Pinch       2pt Pinch                Intake Outcome Measure for FOTO initial eval;  Survey     Therapist reviewed FOTO scores for Raffy Rutherford Jr. on 7/10/2023.   FOTO documents entered into [employer] - see Media section.     Intake Score:    needs to be taken %          Treatment     Raffy received the treatments listed below:          hot pack for 10 minutes to L shoulder.      therapeutic exercises to develop strength, endurance, and posture for 35 minutes including:  - shoulder " rolls forward/backward x 10 reps each   - scap retraction x 10 reps (3 sets)  - isometric ER/IR with yellow band x 10 reps (2 sets ) each   - concentric ER/IR with yellow band x 10 reps each   - standing PNF D2 x 10 reps   - show me the money with yellow rubberband x 10 reps (2 sets)  - rows with yelow theraband x 10 reps (2 sets)  - CW/CCW ball on wall x 10 each (3 sets) NT   - serratus foam roll x 10 reps on wall NT         manual therapy techniques: Myofacial release were applied to the: L deltoid/bicpeps for 8 minutes, including: NT  Trigger point release   Soft tissue anterior/lateral arm      Patient Education and Home Exercises     Education provided:   - new HEP exercises, see patient instructions   - Progress towards goals     Written Home Exercises Provided: yes.  Exercises were reviewed and Raffy was able to demonstrate them prior to the end of the session.  Raffy demonstrated fair  understanding of the HEP provided. See EMR under Patient Instructions for exercises provided during therapy sessions.       Assessment     Good spencer to tx today. No pain.  Updated HEP     Raffy is progressing well towards his goals and there are no updates to goals at this time. Pt prognosis is Fair.     Pt will continue to benefit from skilled outpatient occupational therapy to address the deficits listed in the problem list on initial evaluation provide pt/family education and to maximize pt's level of independence in the home and community environment.     Pt's spiritual, cultural and educational needs considered and pt agreeable to plan of care and goals.    Anticipated barriers to occupational therapy:         Goals:  Long Term Goals (LTGs); to be met by discharge.  Pt will demo improved  strength by 5 pounds   Pt will demo improved shoulder strengthen by measuring 4/5 MMT   Pt will report pain level no greater than 2/10 during functional daily work and community activities   Pt will report decreased limitation  score on FOTO based on predicted value.         Short Term Goals (STGs); to be met within 4 weeks.  Assess  strength   Pt will report pain level no greater than 2-3/10 during light activity   Pt will report/demo compliance with HEP and pain reduction modifications      Plan     Updates/Grading for next session:     Vickie Perkins OT   8/25/2023

## 2023-08-27 NOTE — PROGRESS NOTES
Subjective:       Patient ID: Raffy Rutherford Jr. is a 73 y.o. male.    Chief Complaint: Annual Exam    Patient in for annual assessment of medical problems and follow-up of medical problems.  He has prostate cancer, migraine headaches and chronic back pain on chronic pain meds with lower extremity weakness and pain.  I reviewed his  and he asked me to take over writing his prescription medication which I will review and do.   +on some labs done by another provider a few months ago his AST and ALT were very elevated.  He is not sure the reason but we are concerned.  He says he had hepatitis 50 years ago and was hospitalized but does not remember the details.  We will update some labs.  He has no abdominal pain or jaundice.  He sees Radiation Oncology regularly      Review of Systems   Constitutional:  Negative for chills, fatigue and fever.   HENT:  Negative for nosebleeds and trouble swallowing.    Eyes:  Negative for pain and visual disturbance.   Respiratory:  Negative for cough, shortness of breath and wheezing.    Cardiovascular:  Negative for chest pain and palpitations.   Gastrointestinal:  Negative for abdominal pain, constipation, diarrhea, nausea and vomiting.   Genitourinary:  Negative for difficulty urinating and hematuria.   Musculoskeletal:  Positive for arthralgias, back pain, gait problem, leg pain and myalgias. Negative for neck pain.   Integumentary:  Negative for rash.   Neurological:  Positive for weakness, coordination difficulties and coordination difficulties. Negative for dizziness and headaches.   Hematological:  Does not bruise/bleed easily.   Psychiatric/Behavioral:  Negative for dysphoric mood and sleep disturbance.            Past Medical History:   Diagnosis Date    Allergy     Arthritis     Back pain     after trauma beginning in 89    Cataract     Chronic pain     neck and left shoulder    Cluster headache 2013    Colon polyps 2007 2007-2019: TA x5, HP x3    Degenerative disc  disease     Depression     Diverticulitis 12/2013    Dry eye syndrome     Fibromyalgia 2013    GERD (gastroesophageal reflux disease)     Hepatitis 1970's    A    History of prostate biopsy 2002    Hyperlipidemia     Joint pain     Prostate cancer 07/2021    PVD (posterior vitreous detachment)     Salzmann's nodular dystrophy of left eye     Sleep apnea     Thyroid nodule 07/16/2014    Trigeminal neuralgia of left side of face     Tubular adenoma of colon 2007    5 removed [id number]    Visual disturbance 2012    problems after cataract surgery     Past Surgical History:   Procedure Laterality Date    BACK SURGERY      CARPAL TUNNEL RELEASE Right 5/18/2021    Procedure: RELEASE, CARPAL TUNNEL;  Surgeon: Kaye Solis MD;  Location: [facility];  Service: Orthopedics;  Laterality: Right;    CATARACT EXTRACTION W/  INTRAOCULAR LENS IMPLANT Bilateral     CHOLECYSTECTOMY      COLONOSCOPY N/A 12/17/2019    Normal - Repeat 5yrs    COLONOSCOPY  2007 2007 TA x2, 2011 TA x3, 2014 HP x3, 2019 normal    COSMETIC SURGERY  2/10/2015    Direct mid-forehead brow lift    COSMETIC SURGERY  2/10/2015    Bilateral upper lid blepahroplasty    CYSTOSCOPY WITH URETEROSCOPY, RETROGRADE PYELOGRAPHY, AND INSERTION OF STENT Left 8/19/2020    Procedure: CYSTOSCOPY, WITH RETROGRADE PYELOGRAM AND URETERAL STENT INSERTION;  Surgeon: Katelynn George MD;  Location: [facility] OR;  Service: Urology;  Laterality: Left;    ESOPHAGOGASTRODUODENOSCOPY N/A 12/17/2019    Procedure: ESOPHAGOGASTRODUODENOSCOPY (EGD);  Surgeon: Amadou Hardin MD;  Location: [address]);  Service: Endoscopy;  Laterality: N/A;    EYE SURGERY      FUSION OF LUMBAR SPINE BY ANTERIOR APPROACH N/A 8/20/2020    Procedure: FUSION, SPINE, LUMBAR, ANTERIOR APPROACH L5-S1 ALIF Stand Alone;  Surgeon: Jason Caldwell MD;  Location: [facility] OR;  Service: Neurosurgery;  Laterality: N/A;    HEMORRHOID SURGERY      with complication of chronic bleeding for 6 weeks     INJECTION OF STEROID  Right 12/6/2018    Procedure: INJECTION, STEROID Right SI Joint Block and Steroid Injection;  Surgeon: Jason Caldwell MD;  Location: [facility] OR;  Service: Neurosurgery;  Laterality: Right;  Procedure: Right SI Joint Block and Steroid Injection  Surgery Time: 30 MIN  LOS: 0  Anesthesia: MAC  Radiology: C-arm  Bed: Favian 4 Poster  Position: Prone    INJECTION OF STEROID Right 9/19/2019    Procedure: INJECTION, STEROID Procedure: Right SI joint block nd steroid injection;  Surgeon: Jason Caldwell MD;  Location: [facility] OR;  Service: Neurosurgery;  Laterality: Right;  Procedure: Right SI joint block nd steroid injection  Surgery Time: 30 mins  LOS:   Anesthesia: General MAC  Radiology:C-arm  Bed: Regular Bed  Position: Prone    IRRIGATION AND DEBRIDEMENT OF UPPER EXTREMITY Left 4/7/2022    Procedure: IRRIGATION AND DEBRIDEMENT, UPPER EXTREMITY;  Surgeon: Kaye Solis MD;  Location: [facility] OR;  Service: Orthopedics;  Laterality: Left;    JOINT REPLACEMENT      KNEE SURGERY      involving arthroscopic surgery to both knees    REPAIR OF EXTENSOR TENDON Left 4/7/2022    Procedure: REPAIR, TENDON, EXTENSOR thumb, EPB and EPL;  Surgeon: Kaye Solis MD;  Location: [facility] OR;  Service: Orthopedics;  Laterality: Left;    SINUS SURGERY      left molar and sinus surgery for trigeminal neuralgia    SPINAL FUSION  06/22/2015    L3-L5 XLIF/TANA    TOTAL HIP ARTHROPLASTY  4/2012    Pt states he had total hip replacement on his left hip.    ULNAR NERVE TRANSPOSITION Left 12/16/2020    Procedure: TRANSPOSITION, NERVE, ULNAR - left carpal and cubital tunnel releases;  Surgeon: Addi Bauer MD;  Location: [facility] OR;  Service: Orthopedics;  Laterality: Left;      Patient Active Problem List   Diagnosis    Depression    Hyperlipidemia    Chronic pain    Adenomatous polyp    GERD (gastroesophageal reflux disease)    Back pain    Sleep apnea    Visual disturbances    Spondylosis without myelopathy    Degeneration of lumbar or  lumbosacral intervertebral disc    Spinal stenosis, lumbar region, without neurogenic claudication    Thoracic or lumbosacral neuritis or radiculitis, unspecified    Displacement of lumbar intervertebral disc without myelopathy    Acquired spondylolisthesis    Lumbago    Status post cataract extraction and insertion of intraocular lens - Both Eyes    Fibromyalgia    OP (osteoporosis)    Compression fracture of T12 vertebra    Diverticulitis large intestine    Osteoarthritis    Lower back pain    Lower extremity pain    Muscle weakness    Range of motion deficit    Facet joint disease of cervical region    Occipital neuralgia    Chronic migraine without aura, with intractable migraine, so stated, with status migrainosus    Cervical radiculopathy    Paroxysmal hemicrania    Sciatic nerve pain    Chronic LBP    DJD (degenerative joint disease) of lumbar spine    Chronic neck pain    Right lumbar radiculopathy    Thyroid nodule    Carpal tunnel syndrome of right wrist    Paresthesia    Degenerative disc disease    S/P lumbar spinal fusion    Right wrist tendinitis    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    Headache around the eyes    Closed fracture of left distal radius    Bilateral foot-drop    Idiopathic peripheral neuropathy    Sacroiliac joint dysfunction of right side    SI (sacroiliac) joint dysfunction    Episodic migraine without status migrainosus, not intractable    Lumbar disc herniation with radiculopathy    Anxiety about health    MDD (major depressive disorder), recurrent episode, moderate    Anxiety    History of colon polyps    Iron deficiency anemia    DDD (degenerative disc disease), lumbosacral    Spondylolisthesis at L5-S1 level    CTS (carpal tunnel syndrome)    Pain in left hand    Right carpal tunnel syndrome    Decreased range of motion of neck    Poor posture    Decreased strength of upper extremity    Prostate cancer    Impaired gait    Balance problems    Genetic disorder     Bilateral carotid artery stenosis    Other specified diseases of spinal cord    Chronic pain of left hand    Pain in thumb joint with movement of left hand    Decreased activities of daily living (ADL)    Aortic atherosclerosis    Chronic migraine without aura without status migrainosus, not intractable    Chronic left shoulder pain    Incomplete tear of left rotator cuff    Primary osteoarthritis, left shoulder    Pain    Decreased range of motion of finger of right hand        Objective:      Physical Exam  Constitutional:       General: He is not in acute distress.     Appearance: He is well-developed.   HENT:      Head: Normocephalic and atraumatic.      Right Ear: Tympanic membrane, ear canal and external ear normal.      Left Ear: Tympanic membrane, ear canal and external ear normal.      Mouth/Throat:      Pharynx: No oropharyngeal exudate or posterior oropharyngeal erythema.   Eyes:      General: No scleral icterus.     Conjunctiva/sclera: Conjunctivae normal.      Pupils: Pupils are equal, round, and reactive to light.   Neck:      Thyroid: No thyromegaly.      Comments: No supraclavicular nodes palpated  Cardiovascular:      Rate and Rhythm: Normal rate and regular rhythm.      Pulses: Normal pulses.      Heart sounds: Normal heart sounds. No murmur heard.  Pulmonary:      Effort: Pulmonary effort is normal.      Breath sounds: Normal breath sounds. No wheezing.   Abdominal:      General: Bowel sounds are normal.      Palpations: Abdomen is soft. There is no mass.      Tenderness: There is no abdominal tenderness.   Musculoskeletal:         General: No tenderness.      Cervical back: Normal range of motion and neck supple.      Right lower leg: No edema.      Left lower leg: No edema.   Lymphadenopathy:      Cervical: No cervical adenopathy.   Skin:     Coloration: Skin is not jaundiced or pale.   Neurological:      General: No focal deficit present.      Mental Status: He is alert and oriented to person,  place, and time.      Motor: Weakness present.      Gait: Gait abnormal.   Psychiatric:         Mood and Affect: Mood normal.         Behavior: Behavior normal.         Assessment:       Problem List Items Addressed This Visit          Neuro    Chronic pain    Spondylosis without myelopathy       Psychiatric    Depression    Relevant Medications    clonazePAM (KLONOPIN) 0.5 MG tablet    Anxiety    Relevant Medications    clonazePAM (KLONOPIN) 0.5 MG tablet       GI    GERD (gastroesophageal reflux disease)    Relevant Medications    RABEprazole (ACIPHEX) 20 mg tablet       Orthopedic    Lower extremity pain    Muscle weakness     Other Visit Diagnoses       Routine physical examination    -  Primary    Relevant Orders    Comprehensive Metabolic Panel    CBC Auto Differential    Lipid Panel    TSH    Hemoglobin A1C    Hepatitis Panel, Acute    Elevated LFTs        Relevant Orders    Comprehensive Metabolic Panel    CBC Auto Differential    Lipid Panel    TSH    Hemoglobin A1C    Hepatitis Panel, Acute    Elevated glucose level        Relevant Orders    Hemoglobin A1C            Plan:         Raffy was seen today for annual exam.    Diagnoses and all orders for this visit:    Routine physical examination  -     Comprehensive Metabolic Panel; Future  -     CBC Auto Differential; Future  -     Lipid Panel; Future  -     TSH; Future  -     Hemoglobin A1C; Future  -     Hepatitis Panel, Acute; Future    Elevated LFTs  -     Comprehensive Metabolic Panel; Future  -     CBC Auto Differential; Future  -     Lipid Panel; Future  -     TSH; Future  -     Hemoglobin A1C; Future  -     Hepatitis Panel, Acute; Future    Elevated glucose level  -     Hemoglobin A1C; Future    Moderate episode of recurrent major depressive disorder  -     clonazePAM (KLONOPIN) 0.5 MG tablet; Take 1 tablet by mouth twice a day as needed for anxiety    Anxiety  -     clonazePAM (KLONOPIN) 0.5 MG tablet; Take 1 tablet by mouth twice a day as needed  "for anxiety    Gastroesophageal reflux disease with esophagitis without hemorrhage  -     RABEprazole (ACIPHEX) 20 mg tablet; Take 1 tablet (20 mg total) by mouth 2 (two) times daily.    Other chronic pain    Spondylosis without myelopathy    Pain of right lower extremity    Muscle weakness    Other orders  -     atorvastatin (LIPITOR) 40 MG tablet; Take 1 tablet (40 mg total) by mouth once daily.  -     ergocalciferol (VITAMIN D2) 50,000 unit Cap; Take 1 capsule (50,000 Units total) by mouth every 7 days.  -     HYDROcodone-acetaminophen (NORCO) 5-325 mg per tablet; Take 1 tablet by mouth every 8 (eight) hours as needed for Pain.  -     lamoTRIgine (LAMICTAL) 200 MG tablet; Take 1 tablet (200 mg total) by mouth once daily.  -     selegiline (EMSAM) 9 mg/24 hr; Place 1 patch onto the skin once daily.  -     traZODone (DESYREL) 50 MG tablet; Take 1 tablet (50 mg total) by mouth every evening.  -     pregabalin (LYRICA) 25 MG capsule; Take 1 capsule (25 mg total) by mouth 2 (two) times a day.  -     pregabalin (LYRICA) 225 MG Cap; Take 1 capsule (225 mg total) by mouth every evening.  -     celecoxib (CELEBREX) 100 MG capsule; Take 2 capsules (200 mg total) by mouth 2 (two) times daily.             Continue meds.  Review labs.  Follow-up in a few months sooner p.r.n.        Portions of this note may have been created with voice recognition software. Occasional "wrong-word" or "sound-a-like" substitutions may have occurred due to the inherent limitations of voice recognition software. Please, read the note carefully and recognize, using context, where substitutions have occurred.  "

## 2023-08-28 ENCOUNTER — OFFICE VISIT (OUTPATIENT)
Dept: INTERNAL MEDICINE | Facility: CLINIC | Age: 73
End: 2023-08-28
Payer: COMMERCIAL

## 2023-08-28 ENCOUNTER — LAB VISIT (OUTPATIENT)
Dept: LAB | Facility: HOSPITAL | Age: 73
End: 2023-08-28
Attending: INTERNAL MEDICINE
Payer: COMMERCIAL

## 2023-08-28 VITALS
HEIGHT: 68 IN | BODY MASS INDEX: 27 KG/M2 | SYSTOLIC BLOOD PRESSURE: 122 MMHG | HEART RATE: 61 BPM | OXYGEN SATURATION: 96 % | DIASTOLIC BLOOD PRESSURE: 84 MMHG | WEIGHT: 178.13 LBS

## 2023-08-28 DIAGNOSIS — Z00.00 ROUTINE PHYSICAL EXAMINATION: Primary | ICD-10-CM

## 2023-08-28 DIAGNOSIS — R73.09 ELEVATED GLUCOSE LEVEL: ICD-10-CM

## 2023-08-28 DIAGNOSIS — M62.81 MUSCLE WEAKNESS: ICD-10-CM

## 2023-08-28 DIAGNOSIS — R79.89 ELEVATED LFTS: ICD-10-CM

## 2023-08-28 DIAGNOSIS — F41.9 ANXIETY: ICD-10-CM

## 2023-08-28 DIAGNOSIS — Z00.00 ROUTINE PHYSICAL EXAMINATION: ICD-10-CM

## 2023-08-28 DIAGNOSIS — M79.604 PAIN OF RIGHT LOWER EXTREMITY: ICD-10-CM

## 2023-08-28 DIAGNOSIS — K21.00 GASTROESOPHAGEAL REFLUX DISEASE WITH ESOPHAGITIS WITHOUT HEMORRHAGE: ICD-10-CM

## 2023-08-28 DIAGNOSIS — M47.819 SPONDYLOSIS WITHOUT MYELOPATHY: ICD-10-CM

## 2023-08-28 DIAGNOSIS — G89.29 OTHER CHRONIC PAIN: ICD-10-CM

## 2023-08-28 DIAGNOSIS — F33.1 MODERATE EPISODE OF RECURRENT MAJOR DEPRESSIVE DISORDER: ICD-10-CM

## 2023-08-28 LAB
ALBUMIN SERPL BCP-MCNC: 4.1 G/DL (ref 3.5–5.2)
ALP SERPL-CCNC: 35 U/L (ref 55–135)
ALT SERPL W/O P-5'-P-CCNC: 20 U/L (ref 10–44)
ANION GAP SERPL CALC-SCNC: 8 MMOL/L (ref 8–16)
AST SERPL-CCNC: 18 U/L (ref 10–40)
BASOPHILS # BLD AUTO: 0.04 K/UL (ref 0–0.2)
BASOPHILS NFR BLD: 0.8 % (ref 0–1.9)
BILIRUB SERPL-MCNC: 0.5 MG/DL (ref 0.1–1)
BUN SERPL-MCNC: 11 MG/DL (ref 8–23)
CALCIUM SERPL-MCNC: 9.4 MG/DL (ref 8.7–10.5)
CHLORIDE SERPL-SCNC: 102 MMOL/L (ref 95–110)
CHOLEST SERPL-MCNC: 210 MG/DL (ref 120–199)
CHOLEST/HDLC SERPL: 3.1 {RATIO} (ref 2–5)
CO2 SERPL-SCNC: 27 MMOL/L (ref 23–29)
CREAT SERPL-MCNC: 0.7 MG/DL (ref 0.5–1.4)
DIFFERENTIAL METHOD: NORMAL
EOSINOPHIL # BLD AUTO: 0.1 K/UL (ref 0–0.5)
EOSINOPHIL NFR BLD: 1.6 % (ref 0–8)
ERYTHROCYTE [DISTWIDTH] IN BLOOD BY AUTOMATED COUNT: 13.2 % (ref 11.5–14.5)
EST. GFR  (NO RACE VARIABLE): >60 ML/MIN/1.73 M^2
ESTIMATED AVG GLUCOSE: 111 MG/DL (ref 68–131)
GLUCOSE SERPL-MCNC: 95 MG/DL (ref 70–110)
HAV IGM SERPL QL IA: NORMAL
HBA1C MFR BLD: 5.5 % (ref 4–5.6)
HBV CORE IGM SERPL QL IA: NORMAL
HBV SURFACE AG SERPL QL IA: NORMAL
HCT VFR BLD AUTO: 47.1 % (ref 40–54)
HCV AB SERPL QL IA: NORMAL
HDLC SERPL-MCNC: 68 MG/DL (ref 40–75)
HDLC SERPL: 32.4 % (ref 20–50)
HGB BLD-MCNC: 15.1 G/DL (ref 14–18)
IMM GRANULOCYTES # BLD AUTO: 0.01 K/UL (ref 0–0.04)
IMM GRANULOCYTES NFR BLD AUTO: 0.2 % (ref 0–0.5)
LDLC SERPL CALC-MCNC: 118.4 MG/DL (ref 63–159)
LYMPHOCYTES # BLD AUTO: 2.2 K/UL (ref 1–4.8)
LYMPHOCYTES NFR BLD: 42.5 % (ref 18–48)
MCH RBC QN AUTO: 29.8 PG (ref 27–31)
MCHC RBC AUTO-ENTMCNC: 32.1 G/DL (ref 32–36)
MCV RBC AUTO: 93 FL (ref 82–98)
MONOCYTES # BLD AUTO: 0.5 K/UL (ref 0.3–1)
MONOCYTES NFR BLD: 9.2 % (ref 4–15)
NEUTROPHILS # BLD AUTO: 2.3 K/UL (ref 1.8–7.7)
NEUTROPHILS NFR BLD: 45.7 % (ref 38–73)
NONHDLC SERPL-MCNC: 142 MG/DL
NRBC BLD-RTO: 0 /100 WBC
PLATELET # BLD AUTO: 231 K/UL (ref 150–450)
PMV BLD AUTO: 9.9 FL (ref 9.2–12.9)
POTASSIUM SERPL-SCNC: 4.7 MMOL/L (ref 3.5–5.1)
PROT SERPL-MCNC: 7 G/DL (ref 6–8.4)
RBC # BLD AUTO: 5.06 M/UL (ref 4.6–6.2)
SODIUM SERPL-SCNC: 137 MMOL/L (ref 136–145)
TRIGL SERPL-MCNC: 118 MG/DL (ref 30–150)
TSH SERPL DL<=0.005 MIU/L-ACNC: 0.71 UIU/ML (ref 0.4–4)
WBC # BLD AUTO: 5.1 K/UL (ref 3.9–12.7)

## 2023-08-28 PROCEDURE — 83036 HEMOGLOBIN GLYCOSYLATED A1C: CPT | Performed by: INTERNAL MEDICINE

## 2023-08-28 PROCEDURE — 1157F ADVNC CARE PLAN IN RCRD: CPT | Mod: CPTII,S$GLB,, | Performed by: INTERNAL MEDICINE

## 2023-08-28 PROCEDURE — 80074 ACUTE HEPATITIS PANEL: CPT | Performed by: INTERNAL MEDICINE

## 2023-08-28 PROCEDURE — 99397 PER PM REEVAL EST PAT 65+ YR: CPT | Mod: S$GLB,,, | Performed by: INTERNAL MEDICINE

## 2023-08-28 PROCEDURE — 3288F PR FALLS RISK ASSESSMENT DOCUMENTED: ICD-10-PCS | Mod: CPTII,S$GLB,, | Performed by: INTERNAL MEDICINE

## 2023-08-28 PROCEDURE — 84443 ASSAY THYROID STIM HORMONE: CPT | Performed by: INTERNAL MEDICINE

## 2023-08-28 PROCEDURE — 3008F BODY MASS INDEX DOCD: CPT | Mod: CPTII,S$GLB,, | Performed by: INTERNAL MEDICINE

## 2023-08-28 PROCEDURE — 99397 PR PREVENTIVE VISIT,EST,65 & OVER: ICD-10-PCS | Mod: S$GLB,,, | Performed by: INTERNAL MEDICINE

## 2023-08-28 PROCEDURE — 1101F PT FALLS ASSESS-DOCD LE1/YR: CPT | Mod: CPTII,S$GLB,, | Performed by: INTERNAL MEDICINE

## 2023-08-28 PROCEDURE — 36415 COLL VENOUS BLD VENIPUNCTURE: CPT | Performed by: INTERNAL MEDICINE

## 2023-08-28 PROCEDURE — 1160F RVW MEDS BY RX/DR IN RCRD: CPT | Mod: CPTII,S$GLB,, | Performed by: INTERNAL MEDICINE

## 2023-08-28 PROCEDURE — 99999 PR PBB SHADOW E&M-EST. PATIENT-LVL IV: CPT | Mod: PBBFAC,,, | Performed by: INTERNAL MEDICINE

## 2023-08-28 PROCEDURE — 3079F PR MOST RECENT DIASTOLIC BLOOD PRESSURE 80-89 MM HG: ICD-10-PCS | Mod: CPTII,S$GLB,, | Performed by: INTERNAL MEDICINE

## 2023-08-28 PROCEDURE — 80053 COMPREHEN METABOLIC PANEL: CPT | Performed by: INTERNAL MEDICINE

## 2023-08-28 PROCEDURE — 3074F PR MOST RECENT SYSTOLIC BLOOD PRESSURE < 130 MM HG: ICD-10-PCS | Mod: CPTII,S$GLB,, | Performed by: INTERNAL MEDICINE

## 2023-08-28 PROCEDURE — 85025 COMPLETE CBC W/AUTO DIFF WBC: CPT | Performed by: INTERNAL MEDICINE

## 2023-08-28 PROCEDURE — 1159F PR MEDICATION LIST DOCUMENTED IN MEDICAL RECORD: ICD-10-PCS | Mod: CPTII,S$GLB,, | Performed by: INTERNAL MEDICINE

## 2023-08-28 PROCEDURE — 1160F PR REVIEW ALL MEDS BY PRESCRIBER/CLIN PHARMACIST DOCUMENTED: ICD-10-PCS | Mod: CPTII,S$GLB,, | Performed by: INTERNAL MEDICINE

## 2023-08-28 PROCEDURE — 99999 PR PBB SHADOW E&M-EST. PATIENT-LVL IV: ICD-10-PCS | Mod: PBBFAC,,, | Performed by: INTERNAL MEDICINE

## 2023-08-28 PROCEDURE — 80061 LIPID PANEL: CPT | Performed by: INTERNAL MEDICINE

## 2023-08-28 PROCEDURE — 1159F MED LIST DOCD IN RCRD: CPT | Mod: CPTII,S$GLB,, | Performed by: INTERNAL MEDICINE

## 2023-08-28 PROCEDURE — 3079F DIAST BP 80-89 MM HG: CPT | Mod: CPTII,S$GLB,, | Performed by: INTERNAL MEDICINE

## 2023-08-28 PROCEDURE — 1157F PR ADVANCE CARE PLAN OR EQUIV PRESENT IN MEDICAL RECORD: ICD-10-PCS | Mod: CPTII,S$GLB,, | Performed by: INTERNAL MEDICINE

## 2023-08-28 PROCEDURE — 3008F PR BODY MASS INDEX (BMI) DOCUMENTED: ICD-10-PCS | Mod: CPTII,S$GLB,, | Performed by: INTERNAL MEDICINE

## 2023-08-28 PROCEDURE — 3288F FALL RISK ASSESSMENT DOCD: CPT | Mod: CPTII,S$GLB,, | Performed by: INTERNAL MEDICINE

## 2023-08-28 PROCEDURE — 3074F SYST BP LT 130 MM HG: CPT | Mod: CPTII,S$GLB,, | Performed by: INTERNAL MEDICINE

## 2023-08-28 PROCEDURE — 1101F PR PT FALLS ASSESS DOC 0-1 FALLS W/OUT INJ PAST YR: ICD-10-PCS | Mod: CPTII,S$GLB,, | Performed by: INTERNAL MEDICINE

## 2023-08-28 RX ORDER — HYDROCODONE BITARTRATE AND ACETAMINOPHEN 5; 325 MG/1; MG/1
1 TABLET ORAL EVERY 8 HOURS PRN
Qty: 90 TABLET | Refills: 0 | Status: SHIPPED | OUTPATIENT
Start: 2023-08-28 | End: 2023-10-17 | Stop reason: SDUPTHER

## 2023-08-28 RX ORDER — CELECOXIB 100 MG/1
200 CAPSULE ORAL 2 TIMES DAILY
Qty: 180 CAPSULE | Refills: 3 | Status: SHIPPED | OUTPATIENT
Start: 2023-08-28

## 2023-08-28 RX ORDER — LAMOTRIGINE 200 MG/1
200 TABLET ORAL DAILY
Qty: 90 TABLET | Refills: 3 | Status: SHIPPED | OUTPATIENT
Start: 2023-08-28

## 2023-08-28 RX ORDER — PREGABALIN 225 MG/1
225 CAPSULE ORAL NIGHTLY
Qty: 93 CAPSULE | Refills: 3 | Status: SHIPPED | OUTPATIENT
Start: 2023-08-28

## 2023-08-28 RX ORDER — TRAZODONE HYDROCHLORIDE 50 MG/1
50 TABLET ORAL NIGHTLY
Qty: 90 TABLET | Refills: 1 | Status: SHIPPED | OUTPATIENT
Start: 2023-08-28

## 2023-08-28 RX ORDER — ATORVASTATIN CALCIUM 40 MG/1
40 TABLET, FILM COATED ORAL DAILY
Qty: 90 TABLET | Refills: 1 | Status: SHIPPED | OUTPATIENT
Start: 2023-08-28 | End: 2024-01-02 | Stop reason: SDUPTHER

## 2023-08-28 RX ORDER — CLONAZEPAM 0.5 MG/1
TABLET ORAL
Qty: 60 TABLET | Refills: 5 | Status: SHIPPED | OUTPATIENT
Start: 2023-08-28 | End: 2024-03-05 | Stop reason: SDUPTHER

## 2023-08-28 RX ORDER — ERGOCALCIFEROL 1.25 MG/1
50000 CAPSULE ORAL
Qty: 12 CAPSULE | Refills: 3 | Status: SHIPPED | OUTPATIENT
Start: 2023-08-28

## 2023-08-28 RX ORDER — PREGABALIN 25 MG/1
25 CAPSULE ORAL 2 TIMES DAILY
Qty: 180 CAPSULE | Refills: 0 | Status: SHIPPED | OUTPATIENT
Start: 2023-08-28

## 2023-08-28 RX ORDER — RABEPRAZOLE SODIUM 20 MG/1
20 TABLET, DELAYED RELEASE ORAL 2 TIMES DAILY
Qty: 180 TABLET | Refills: 3 | Status: SHIPPED | OUTPATIENT
Start: 2023-08-28

## 2023-08-29 ENCOUNTER — OFFICE VISIT (OUTPATIENT)
Dept: SPORTS MEDICINE | Facility: CLINIC | Age: 73
End: 2023-08-29
Payer: COMMERCIAL

## 2023-08-29 VITALS
SYSTOLIC BLOOD PRESSURE: 121 MMHG | DIASTOLIC BLOOD PRESSURE: 72 MMHG | WEIGHT: 178.56 LBS | HEART RATE: 65 BPM | BODY MASS INDEX: 27.06 KG/M2 | HEIGHT: 68 IN

## 2023-08-29 DIAGNOSIS — G89.29 CHRONIC LEFT SHOULDER PAIN: ICD-10-CM

## 2023-08-29 DIAGNOSIS — M67.922 BICEPS TENDINOPATHY, LEFT: Primary | ICD-10-CM

## 2023-08-29 DIAGNOSIS — M94.212 CHONDROMALACIA OF LEFT SHOULDER: ICD-10-CM

## 2023-08-29 DIAGNOSIS — M75.112 INCOMPLETE TEAR OF LEFT ROTATOR CUFF, UNSPECIFIED WHETHER TRAUMATIC: ICD-10-CM

## 2023-08-29 DIAGNOSIS — M25.512 CHRONIC LEFT SHOULDER PAIN: ICD-10-CM

## 2023-08-29 DIAGNOSIS — S43.432A SUPERIOR GLENOID LABRUM LESION OF LEFT SHOULDER, INITIAL ENCOUNTER: ICD-10-CM

## 2023-08-29 PROCEDURE — 1159F PR MEDICATION LIST DOCUMENTED IN MEDICAL RECORD: ICD-10-PCS | Mod: CPTII,S$GLB,, | Performed by: ORTHOPAEDIC SURGERY

## 2023-08-29 PROCEDURE — 3078F DIAST BP <80 MM HG: CPT | Mod: CPTII,S$GLB,, | Performed by: ORTHOPAEDIC SURGERY

## 2023-08-29 PROCEDURE — 3288F FALL RISK ASSESSMENT DOCD: CPT | Mod: CPTII,S$GLB,, | Performed by: ORTHOPAEDIC SURGERY

## 2023-08-29 PROCEDURE — 3078F PR MOST RECENT DIASTOLIC BLOOD PRESSURE < 80 MM HG: ICD-10-PCS | Mod: CPTII,S$GLB,, | Performed by: ORTHOPAEDIC SURGERY

## 2023-08-29 PROCEDURE — 99214 PR OFFICE/OUTPT VISIT, EST, LEVL IV, 30-39 MIN: ICD-10-PCS | Mod: 25,S$GLB,, | Performed by: ORTHOPAEDIC SURGERY

## 2023-08-29 PROCEDURE — 3008F BODY MASS INDEX DOCD: CPT | Mod: CPTII,S$GLB,, | Performed by: ORTHOPAEDIC SURGERY

## 2023-08-29 PROCEDURE — 76942 ECHO GUIDE FOR BIOPSY: CPT | Mod: S$GLB,,, | Performed by: ORTHOPAEDIC SURGERY

## 2023-08-29 PROCEDURE — 3044F PR MOST RECENT HEMOGLOBIN A1C LEVEL <7.0%: ICD-10-PCS | Mod: CPTII,S$GLB,, | Performed by: ORTHOPAEDIC SURGERY

## 2023-08-29 PROCEDURE — 99999 PR PBB SHADOW E&M-EST. PATIENT-LVL IV: ICD-10-PCS | Mod: PBBFAC,,, | Performed by: ORTHOPAEDIC SURGERY

## 2023-08-29 PROCEDURE — 99999 PR PBB SHADOW E&M-EST. PATIENT-LVL IV: CPT | Mod: PBBFAC,,, | Performed by: ORTHOPAEDIC SURGERY

## 2023-08-29 PROCEDURE — 3008F PR BODY MASS INDEX (BMI) DOCUMENTED: ICD-10-PCS | Mod: CPTII,S$GLB,, | Performed by: ORTHOPAEDIC SURGERY

## 2023-08-29 PROCEDURE — 76942 TENDON SHEATH: ICD-10-PCS | Mod: S$GLB,,, | Performed by: ORTHOPAEDIC SURGERY

## 2023-08-29 PROCEDURE — 3044F HG A1C LEVEL LT 7.0%: CPT | Mod: CPTII,S$GLB,, | Performed by: ORTHOPAEDIC SURGERY

## 2023-08-29 PROCEDURE — 20550 TENDON SHEATH: ICD-10-PCS | Mod: LT,S$GLB,, | Performed by: ORTHOPAEDIC SURGERY

## 2023-08-29 PROCEDURE — 1159F MED LIST DOCD IN RCRD: CPT | Mod: CPTII,S$GLB,, | Performed by: ORTHOPAEDIC SURGERY

## 2023-08-29 PROCEDURE — 1157F PR ADVANCE CARE PLAN OR EQUIV PRESENT IN MEDICAL RECORD: ICD-10-PCS | Mod: CPTII,S$GLB,, | Performed by: ORTHOPAEDIC SURGERY

## 2023-08-29 PROCEDURE — 1101F PR PT FALLS ASSESS DOC 0-1 FALLS W/OUT INJ PAST YR: ICD-10-PCS | Mod: CPTII,S$GLB,, | Performed by: ORTHOPAEDIC SURGERY

## 2023-08-29 PROCEDURE — 1125F PR PAIN SEVERITY QUANTIFIED, PAIN PRESENT: ICD-10-PCS | Mod: CPTII,S$GLB,, | Performed by: ORTHOPAEDIC SURGERY

## 2023-08-29 PROCEDURE — 1101F PT FALLS ASSESS-DOCD LE1/YR: CPT | Mod: CPTII,S$GLB,, | Performed by: ORTHOPAEDIC SURGERY

## 2023-08-29 PROCEDURE — 3074F PR MOST RECENT SYSTOLIC BLOOD PRESSURE < 130 MM HG: ICD-10-PCS | Mod: CPTII,S$GLB,, | Performed by: ORTHOPAEDIC SURGERY

## 2023-08-29 PROCEDURE — 99214 OFFICE O/P EST MOD 30 MIN: CPT | Mod: 25,S$GLB,, | Performed by: ORTHOPAEDIC SURGERY

## 2023-08-29 PROCEDURE — 1157F ADVNC CARE PLAN IN RCRD: CPT | Mod: CPTII,S$GLB,, | Performed by: ORTHOPAEDIC SURGERY

## 2023-08-29 PROCEDURE — 3074F SYST BP LT 130 MM HG: CPT | Mod: CPTII,S$GLB,, | Performed by: ORTHOPAEDIC SURGERY

## 2023-08-29 PROCEDURE — 1125F AMNT PAIN NOTED PAIN PRSNT: CPT | Mod: CPTII,S$GLB,, | Performed by: ORTHOPAEDIC SURGERY

## 2023-08-29 PROCEDURE — 3288F PR FALLS RISK ASSESSMENT DOCUMENTED: ICD-10-PCS | Mod: CPTII,S$GLB,, | Performed by: ORTHOPAEDIC SURGERY

## 2023-08-29 PROCEDURE — 20550 NJX 1 TENDON SHEATH/LIGAMENT: CPT | Mod: LT,S$GLB,, | Performed by: ORTHOPAEDIC SURGERY

## 2023-08-29 RX ORDER — TRIAMCINOLONE ACETONIDE 40 MG/ML
40 INJECTION, SUSPENSION INTRA-ARTICULAR; INTRAMUSCULAR
Status: DISCONTINUED | OUTPATIENT
Start: 2023-08-29 | End: 2023-08-29 | Stop reason: HOSPADM

## 2023-08-29 RX ADMIN — TRIAMCINOLONE ACETONIDE 40 MG: 40 INJECTION, SUSPENSION INTRA-ARTICULAR; INTRAMUSCULAR at 09:08

## 2023-08-30 ENCOUNTER — HOSPITAL ENCOUNTER (OUTPATIENT)
Dept: RADIOLOGY | Facility: HOSPITAL | Age: 73
Discharge: HOME | End: 2023-08-30
Attending: RADIOLOGY
Payer: COMMERCIAL

## 2023-08-30 DIAGNOSIS — C61 PROSTATE CANCER: ICD-10-CM

## 2023-08-30 PROCEDURE — 72197 MRI PELVIS W/O & W/DYE: CPT | Mod: 26,,, | Performed by: RADIOLOGY

## 2023-08-30 PROCEDURE — 25500020 PHARM REV CODE 255: Performed by: RADIOLOGY

## 2023-08-30 PROCEDURE — 72197 MRI PELVIS W/O & W/DYE: CPT | Mod: TC

## 2023-08-30 PROCEDURE — A9585 GADOBUTROL INJECTION: HCPCS | Performed by: RADIOLOGY

## 2023-08-30 PROCEDURE — 72197 MRI PROSTATE W W/O CONTRAST: ICD-10-PCS | Mod: 26,,, | Performed by: RADIOLOGY

## 2023-08-30 RX ORDER — GADOBUTROL 604.72 MG/ML
10 INJECTION INTRAVENOUS
Status: COMPLETED | OUTPATIENT
Start: 2023-08-30 | End: 2023-08-30

## 2023-08-30 RX ADMIN — GADOBUTROL 10 ML: 604.72 INJECTION INTRAVENOUS at 04:08

## 2023-09-01 ENCOUNTER — OFFICE VISIT (OUTPATIENT)
Dept: DERMATOLOGY | Facility: CLINIC | Age: 73
End: 2023-09-01
Payer: COMMERCIAL

## 2023-09-01 ENCOUNTER — PATIENT MESSAGE (OUTPATIENT)
Dept: RADIATION ONCOLOGY | Facility: CLINIC | Age: 73
End: 2023-09-01
Payer: COMMERCIAL

## 2023-09-01 ENCOUNTER — PATIENT MESSAGE (OUTPATIENT)
Dept: INTERNAL MEDICINE | Facility: CLINIC | Age: 73
End: 2023-09-01
Payer: COMMERCIAL

## 2023-09-01 DIAGNOSIS — D22.9 MULTIPLE BENIGN MELANOCYTIC NEVI: ICD-10-CM

## 2023-09-01 DIAGNOSIS — Z12.83 SCREENING EXAM FOR SKIN CANCER: ICD-10-CM

## 2023-09-01 DIAGNOSIS — D48.5 NEOPLASM OF UNCERTAIN BEHAVIOR OF SKIN: Primary | ICD-10-CM

## 2023-09-01 DIAGNOSIS — L82.1 SEBORRHEIC KERATOSIS: ICD-10-CM

## 2023-09-01 PROCEDURE — 3288F FALL RISK ASSESSMENT DOCD: CPT | Mod: CPTII,S$GLB,, | Performed by: DERMATOLOGY

## 2023-09-01 PROCEDURE — 99203 PR OFFICE/OUTPT VISIT, NEW, LEVL III, 30-44 MIN: ICD-10-PCS | Mod: 25,S$GLB,, | Performed by: DERMATOLOGY

## 2023-09-01 PROCEDURE — 88305 TISSUE EXAM BY PATHOLOGIST: ICD-10-PCS | Mod: 26,,, | Performed by: PATHOLOGY

## 2023-09-01 PROCEDURE — 88305 TISSUE EXAM BY PATHOLOGIST: CPT | Performed by: PATHOLOGY

## 2023-09-01 PROCEDURE — 1159F MED LIST DOCD IN RCRD: CPT | Mod: CPTII,S$GLB,, | Performed by: DERMATOLOGY

## 2023-09-01 PROCEDURE — 1101F PR PT FALLS ASSESS DOC 0-1 FALLS W/OUT INJ PAST YR: ICD-10-PCS | Mod: CPTII,S$GLB,, | Performed by: DERMATOLOGY

## 2023-09-01 PROCEDURE — 3044F HG A1C LEVEL LT 7.0%: CPT | Mod: CPTII,S$GLB,, | Performed by: DERMATOLOGY

## 2023-09-01 PROCEDURE — 1159F PR MEDICATION LIST DOCUMENTED IN MEDICAL RECORD: ICD-10-PCS | Mod: CPTII,S$GLB,, | Performed by: DERMATOLOGY

## 2023-09-01 PROCEDURE — 1160F PR REVIEW ALL MEDS BY PRESCRIBER/CLIN PHARMACIST DOCUMENTED: ICD-10-PCS | Mod: CPTII,S$GLB,, | Performed by: DERMATOLOGY

## 2023-09-01 PROCEDURE — 1157F ADVNC CARE PLAN IN RCRD: CPT | Mod: CPTII,S$GLB,, | Performed by: DERMATOLOGY

## 2023-09-01 PROCEDURE — 88305 TISSUE EXAM BY PATHOLOGIST: CPT | Mod: 26,,, | Performed by: PATHOLOGY

## 2023-09-01 PROCEDURE — 1157F PR ADVANCE CARE PLAN OR EQUIV PRESENT IN MEDICAL RECORD: ICD-10-PCS | Mod: CPTII,S$GLB,, | Performed by: DERMATOLOGY

## 2023-09-01 PROCEDURE — 1126F PR PAIN SEVERITY QUANTIFIED, NO PAIN PRESENT: ICD-10-PCS | Mod: CPTII,S$GLB,, | Performed by: DERMATOLOGY

## 2023-09-01 PROCEDURE — 99203 OFFICE O/P NEW LOW 30 MIN: CPT | Mod: 25,S$GLB,, | Performed by: DERMATOLOGY

## 2023-09-01 PROCEDURE — 1101F PT FALLS ASSESS-DOCD LE1/YR: CPT | Mod: CPTII,S$GLB,, | Performed by: DERMATOLOGY

## 2023-09-01 PROCEDURE — 11102 TANGNTL BX SKIN SINGLE LES: CPT | Mod: S$GLB,,, | Performed by: DERMATOLOGY

## 2023-09-01 PROCEDURE — 1160F RVW MEDS BY RX/DR IN RCRD: CPT | Mod: CPTII,S$GLB,, | Performed by: DERMATOLOGY

## 2023-09-01 PROCEDURE — 3288F PR FALLS RISK ASSESSMENT DOCUMENTED: ICD-10-PCS | Mod: CPTII,S$GLB,, | Performed by: DERMATOLOGY

## 2023-09-01 PROCEDURE — 3044F PR MOST RECENT HEMOGLOBIN A1C LEVEL <7.0%: ICD-10-PCS | Mod: CPTII,S$GLB,, | Performed by: DERMATOLOGY

## 2023-09-01 PROCEDURE — 1126F AMNT PAIN NOTED NONE PRSNT: CPT | Mod: CPTII,S$GLB,, | Performed by: DERMATOLOGY

## 2023-09-01 PROCEDURE — 11102 PR TANGENTIAL BIOPSY, SKIN, SINGLE LESION: ICD-10-PCS | Mod: S$GLB,,, | Performed by: DERMATOLOGY

## 2023-09-01 NOTE — PROCEDURES
Raffy Rutherford Jr. presents for follow up evaluation of   Encounter Diagnosis   Name Primary?    Dupuytren's contracture of right hand Yes     Overall the patient reports doing well, he reports some swelling but minimal discomfort. The patient is here today for a Xiaflex manipulation and tolerated the procedure well. There is no complaint of numbness/tingling.     PE:    AA&O x 4.  NAD  HEENT:  NCAT, sclera nonicteric  Lungs:  Respirations are equal and unlabored.  CV:  2+ bilateral upper and lower extremity pulses.  MSK:  The patient does have swelling, does have ecchymosis. Neurovascularly intact bilaterally.  5/5 thenar and intrinsic musculature strength.  After manipulation, full range of motion hands, wrists and elbows.     Procedure: Under sterile conditions a digital block was performed on the right small and long fingers of the right hand. Manipulation was performed, and the cord was ruptured.  Patient was placed in an extension splint and was referred to OT for splint fabrication.  There were no complications and no skin tear or blood loss.    A/P: Status post Xiaflex injection and manipulation  1) Continue with light use of the right hand and night time extension splint wear only  2) F/U 4 weeks  3) Call with any questions/concerns in the interim        Kaye Solis MD    Please be aware that this note has been generated with the assistance of [facility] voice-to-text.  Please excuse any spelling or grammatical errors.

## 2023-09-01 NOTE — PROGRESS NOTES
"  Patient Information  Name: Raffy Rutherford Jr.  : 1950  MRN: [id number]     Referring Physician:  Self   Primary Care Physician:  Haseeb Puentes MD   Date of Visit: 2023      Subjective:     History of Present lllness:    Raffy Rutherford Jr. is a 73 y.o. male who presents with a chief complaint of moles and lesion.  Patient is here today for a "mole" check.     Today, patient complains of lesion(s):  Location: left hairline  Duration: 3 weeks  Symptoms: hurts when he knocks it  Relieving factors/Previous treatments: none    Patient was last seen: 2018.  Prior notes by myself reviewed.   Clinical documentation obtained by nursing staff reviewed.    Review of Systems    Objective:   Physical Exam   Constitutional: He appears well-developed and well-nourished. No distress.   Neurological: He is alert and oriented to person, place, and time. He is not disoriented.   Psychiatric: He has a normal mood and affect.   Skin:   Areas Examined (abnormalities noted in diagram):   Scalp / Hair Palpated and Inspected  Head / Face Inspection Performed  Neck Inspection Performed  Chest / Axilla Inspection Performed  Abdomen Inspection Performed  Genitals / Buttocks / Groin Inspection Performed  Back Inspection Performed  RUE Inspected  LUE Inspection Performed  RLE Inspected  LLE Inspection Performed  Nails and Digits Inspection Performed                     Diagram Legend     Erythematous scaling macule/papule c/w actinic keratosis       Vascular papule c/w angioma      Pigmented verrucoid papule/plaque c/w seborrheic keratosis      Yellow umbilicated papule c/w sebaceous hyperplasia      Irregularly shaped tan macule c/w lentigo     1-2 mm smooth white papules consistent with Milia      Movable subcutaneous cyst with punctum c/w epidermal inclusion cyst      Subcutaneous movable cyst c/w pilar cyst      Firm pink to brown papule c/w dermatofibroma      Pedunculated fleshy papule(s) c/w skin tag(s)      Evenly " pigmented macule c/w junctional nevus     Mildly variegated pigmented, slightly irregular-bordered macule c/w mildly atypical nevus      Flesh colored to evenly pigmented papule c/w intradermal nevus       Pink pearly papule/plaque c/w basal cell carcinoma      Erythematous hyperkeratotic cursted plaque c/w SCC      Surgical scar with no sign of skin cancer recurrence      Open and closed comedones      Inflammatory papules and pustules      Verrucoid papule consistent consistent with wart     Erythematous eczematous patches and plaques     Dystrophic onycholytic nail with subungual debris c/w onychomycosis     Umbilicated papule    Erythematous-base heme-crusted tan verrucoid plaque consistent with inflamed seborrheic keratosis     Erythematous Silvery Scaling Plaque c/w Psoriasis     See annotation          [] Data reviewed  [] Prior external notes reviewed  [] Independent review of test  [] Management discussed with another provider  [] Independent historian    Assessment / Plan:      Pathology Orders:       Normal Orders This Visit    Specimen to Pathology, Dermatology     Questions:    Procedure Type: Dermatology and skin neoplasms    Number of Specimens: 1    ------------------------: -------------------------    Spec 1 Procedure: Biopsy    Spec 1 Clinical Impression: r/o SCC    Spec 1 Source: left frontal scalp    Release to patient:           Neoplasm of uncertain behavior of skin  -     Specimen to Pathology, Dermatology    Shave biopsy procedure note:  Risk, benefits, and alternatives of biopsy are discussed with the patient, including risk of infection, scar, recurrence, and need for additional treatment of site. The patient agrees to the procedure by verbal consent. The area is marked and prepped with alcohol.  Approximately 1 mL of lidocaine 1% with epinephrine is used for local anesthesia. A sharp blade is used to remove a portion of the lesion. The specimen is sent for pathology. Hemostasis is  obtained with aluminum chloride and/or monopolar hyfrecation if needed. The area is then dressed and bandaged. The patient tolerated the procedure well without adverse event. Written instructions on wound care were given and were reviewed with the patient, who is to call for any signs of bleeding or infection. The patient will be notified of the pathology results.    Multiple benign melanocytic nevi  Multiple benign-appearing nevi present on exam today. Reassurance provided. Counseled patient to periodically examine moles and return to clinic if any changes in size, shape, or color are noted or if it becomes symptomatic (bleeding, itching, pain, etc).  Recommend using a broad-spectrum, water-resistant sunscreen with SPF of 30 or higher--reapply every 2 hours. Seek shade, wear sun-protective clothing, and perform regular skin self-exams.    Seborrheic keratosis  These are benign, inherited growths without a malignant potential. Reassurance given to patient. No treatment is necessary.    Screening exam for skin cancer  Total body skin examination performed today as noted in physical exam. Suspicious lesion(s) were noted and/or biopsied as above.  Recommend using a broad-spectrum, water-resistant sunscreen with SPF of 30 or higher--reapply every 2 hours. Seek shade, wear sun-protective clothing, and perform regular skin self-exams.      Follow up in about 1 year (around 9/1/2024) for TBSE, or sooner dependent on pathology results.      Mariel Agarwal MD, FAAD  [facility]

## 2023-09-01 NOTE — PATIENT INSTRUCTIONS
Biopsy Wound Care Instructions    Leave the bandage on for 24 hours without getting it wet.   Clean the area once a day with a gentle soap and water, then pat dry and apply Vaseline and a bandaid.  The site should be kept moist with Vaseline at all times to improve healing. Reapply a thick coating as needed. Do not let the site air out or form a scab, as this will delay healing and worsen scarring.  If any bleeding or oozing occurs once you return home, apply firm pressure to the area for 30 minutes straight without peeking. If bleeding continues, call the office immediately.  Please message us via [facility], call us at [phone], or return to the office at any sign of increasing redness, swelling, tenderness, pain, heat, yellow drainage/discharge, or continued bleeding.      Receiving Your Pathology Results    Your pathology results will be released to you on [facility] at the same time that Dr. Agarwal receives them.   Dr. Agarwal will then message you with her interpretation of the results and/or with the plan going forward.  If you do not use [facility] or if your pathology results require more of an explanation, you will receive your results via a phone call.  If 2 weeks go by and you have not received your results, please message us via [facility] or call us at [phone] to inform us.

## 2023-09-08 ENCOUNTER — PATIENT MESSAGE (OUTPATIENT)
Dept: RADIATION ONCOLOGY | Facility: CLINIC | Age: 73
End: 2023-09-08
Payer: COMMERCIAL

## 2023-09-08 LAB
FINAL PATHOLOGIC DIAGNOSIS: NORMAL
GROSS: NORMAL
Lab: NORMAL
MICROSCOPIC EXAM: NORMAL

## 2023-09-08 NOTE — PROGRESS NOTES
Final Pathologic Diagnosis Skin, left frontal scalp, shave biopsy:   -SEBORRHEIC KERATOSIS WITH ACTINIC CHANGES, IRRITATED AND INFLAMED

## 2023-09-12 DIAGNOSIS — Z87.19 HISTORY OF DIVERTICULITIS: Primary | ICD-10-CM

## 2023-09-12 RX ORDER — AMOXICILLIN AND CLAVULANATE POTASSIUM 875; 125 MG/1; MG/1
1 TABLET, FILM COATED ORAL EVERY 12 HOURS
Qty: 28 TABLET | Refills: 0 | Status: SHIPPED | OUTPATIENT
Start: 2023-09-12 | End: 2023-09-26

## 2023-09-13 DIAGNOSIS — C61 PROSTATE CANCER: Primary | ICD-10-CM

## 2023-10-02 NOTE — PROGRESS NOTES
Physical Therapy Daily Treatment Note     Name: Raffy Rutherford Jr.  Clinic Number: [id number]    Therapy Diagnosis:   Low back pain  Physician: Jason Caldwell MD    Visit Date: 2/18/2019  Physician Orders: PT Eval and Treat Low back pain w/ bilat radiculopathy  Medical Diagnosis from Referral: SI joint dysfunction, s/p lumbar fusion, s/p R SHARAD, polyneuropathy  Evaluation Date: 11/15/2018  Authorization Period Expiration: 12/31/2019  Plan of Care Expiration: 4/8/19  Visit # / Visits authorized: 8/30    Time In: 1400  Time Out: 1500  Total Billable Time:60 minutes    Precautions: Standard    Subjective     Pt reports: 3/10 back using SI belt for support  He was compliant with home exercise program.  Response to previous treatment: soreness x 1 day   Functional change: using B AFO less with mobility    Pain: 2/10   Location: Back & legs    Objective   Raffy sustained L distal radius fx while on vacation in [place] 2 weeks ago- has short arm cast (covered with vinyl cast cover)    Raffy received aquatic therapeutic exercises to develop strength, endurance, ROM, flexibility, posture and core stabilization for 60 minutes including:    WARMUP  Walking fwd/back/side x 2 laps    STRETCHES  HS  quad  Piriformis- R only    LE EX 10oz weights  Mini Squat with QS x30  Heel raise with GS x30  Single leg heel raise x10-np  Fwd step up x 30  Lateral steps up x 30 with intermittent UE support-np  Step up/down/lateral on box x 10 each with single UE support  Hip hikes x 30-np  Walking lunges in // bars x 4 laps(VC for push off)-np  Hip abd/add x 30-  Abduction walks in mini squat stance with B hip IR x 2 lap  Monster walks x 2 laps  Fwd steps up on 2 steps x 10 each  Step down on 2 steps x 10 each-np  Running with push off on toes x 4 laps with 30-45 sec rest between each lap  Resistive walking with PT holding orange tband fwd/back/side x 1/2 lap x3 each direction   Modified tandem for hitting ball with tennis racket(clinton,  Virtual Regular Visit    Verification of patient location:    Patient is located at Home in the following state in which I hold an active license [place]      Assessment/Plan:    Patient ID: Malu Traore is a 57 y.o. 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)  He has not had any additional events concerning for seizures since 3/2/2023. Overall, his events became less frequent and resolved mainly after he started working with a psychologist and noting a significant decrease in his stress level. 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 -- at this point, he is tolerating his seizure medications well and not having any other events, so I would recommend he continue his medications unchanged. -- he is due to get some blood work, so I will order a CBC, CMP, Levetiracetam level, and Oxcarbazepine level     -- I also stressed the importance of continuing to follow with his mental health team for ongoing management of his mood and to help avoid additional events. -- he still does not feel comfortable driving. He is now >6 months from his most recent event, so we could fill out [place] DOT forms to get his license back. He will be in touch with our office if he would like to pursue this. He will Return in about 6 months (around 4/2/2024).           Reason for visit is   Chief Complaint   Patient presents with   • Virtual Regular Visit        Encounter provider Maxine San MD    Provider located at [address] [place] [address] [place] [address] [address] [place]  [id number]  [phone]      Recent Visits  No visits were found meeting these conditions. Showing recent visits within past 7 days and meeting all other requirements  Today's Visits  Date Type Provider Dept   10/02/23 Telemedicine Acosta Bravo MD [facility] [address] today's visits and meeting all other requirements  Future Appointments  No visits were found meeting these conditions. Showing future appointments within next 150 days and meeting all other requirements       The patient was identified by name and date of birth. Fred Baltazar was informed that this is a telemedicine visit and that the visit is being conducted through the [employer]. He agrees to proceed. .  My office door was closed. No one else was in the room. He acknowledged consent and understanding of privacy and security of the video platform. The patient has agreed to participate and understands they can discontinue the visit at any time. Patient is aware this is a billable service. Mitchell Mauricio is a 57 y.o. male     Current seizure medications:  1. Levetiracetam 1500 mg twice a day  2. Oxcarbazepine 900 mg in the am and 1200 mg in the pm  Other medications as per [facility]. Since his last visit, he has been doing better. He hasn't had any other seizures. The last event he had concerning for a seizure was on 3/2/2023. He has been working with a psychologist and did also start to see a psychiatrist. He has a lot less stress currently due to custody issues and housing issues being resolved. He was started on Buspar and had bad side effects. He subsequently stopped the buspirone and the side effects resolved about a month after stopping it. He is nervous that if he takes any other medications, so he has been avoiding even taking OTC medications. He still prefers to hold off on returning to drive.  He had events during stressful drives in the backhand  &overhead)  X 5 minutes using RUE  Running backwards x 2 laps-VC for core stabization- np    Ankle stabilization:  Heel walks in // bars 2 laps-np   Toe walks in // bars with UE support 2 laps  Static standing in // bars in tandem stance 2x45 sec with each LE in the posterior position -np  Tandem walks fwd/back in // bars with occasional UE support 2 laps   Marching with 3 sec holds & focus on cocontraction of quads/hamstring with stance phase  Mini squat hold with chest press of disc x 20-np  Walking lunges with ball rotation x 2 laps  Braiding holding railing x 2 laps(LOB x2)  Staggered stance for catch & throw ball(LOB x1) 2/2 fair activation of ankle df  Seated wonder board using BUE/LE to propel fwd/back/side x 2 laps each  Wobble board for fwd/back & laterally x20 each direction  Wobble board for clockwise & ccw x 20 each direction with addition of LE weights    Fergus tb for ankle pf/eversion/inversion x 15 reps-np    Core: NP  Shoulder horizontal abd/add in modified tandem with apc 30x-np  Shoulder flx/ext in modified tandem with apc 30x-np  Shoulder extensions with 1 DB submersion just below surface in long lever arm position 3 x 30 sec each   Wall sits w/ aquatic 1 aquatic weights in long lever arm position 3x30 sec NP  Static standing in mini squat position with yellow ball submersion chest press 3x10 -np    ENDURANCE  Marching x 3 min-NP    COOL DOWN  1 lap fwd/back/sideways    Manual spinal stretches for 3 x 15 sec-np  Patient brought SI stabilization belt as recommended by land PT; assisted patient in proper fit    Home Exercises Provided and Patient Education Provided     Education provided:   - knee to chest stretches & truncal rotations(HEP2Go [id number])    Written Home Exercises Provided: yes.  Exercises were reviewed and Raffy was able to demonstrate them prior to the end of the session.  Raffy demonstrated good  understanding of the education provided.     See EMR under Media for  exercises provided 12/11/2018. Added additional ex of towel scrunches & single leg stance against wall(1/7/2019)     Assessment     Pt spencer tx with ther ex well, good form, posture, core stab tech with few VCs required, Modified some exercises 2/2 LUE with cast cover.. Tolerated progression of exercises with addition of LE weights without any increases in pain Raffy is highly motivated & participatory with PT treatment &  adherent to  HEP. Goals remain appropriate  Raffy is progressing well towards his goals.   Pt prognosis is Good.     Pt will continue to benefit from skilled aquatic outpatient physical therapy to address the deficits listed in the problem list box on initial evaluation, provide pt/family education and to maximize pt's level of independence in the home and community environment.     Pt's spiritual, cultural and educational needs considered and pt agreeable to plan of care and goals.    Anticipated barriers to physical therapy: none    Goals:  Short Term Goals: 4 weeks   1. Pt will be able to tolerate 20 minutes of driving reporting no more than 10% increase in back pain/ radicular sx(progresing- not met)  2. Pt will be able to ascend/descend 12 steps with one railing reporting no increase in pain in order to be able to navigate to work(progressing - not met)  3. Pt will be independent with HEP and report compliance at least 5 days/week(MET 2/18/2019)     Long Term Goals: 8 weeks   1. Pt will demonstrate B foot MMT of at least 4/5 throughout to demonstrate improved functional mobility(progressing - not met)  2. Pt will be able to tolerate 1 hour of standing reporting no increase in sx to be able to tolerate playing stand up bass(progressing - not met)  3. Pt will demonstrate independent gait with minimal gait deviations(progressing - not met)  Plan     Plan of care Certification: 11/15/2018 to 2/15/19.     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   • Seizure ([address])     possible, unclear history   • Stroke ([address])    • TIA (transient ischemic attack)     Possible       Past Surgical History:   Procedure Laterality Date   • APPENDECTOMY     • CARDIAC SURGERY     • ROTATOR CUFF REPAIR         Current Outpatient Medications   Medication Sig Dispense Refill   • amLODIPine (NORVASC) 5 mg tablet TAKE 1 TABLET BY MOUTH EVERY DAY 90 tablet 1   • atorvastatin (LIPITOR) 20 mg tablet TAKE 1 TABLET BY MOUTH EVERY DAY 90 tablet 1   • clobetasol (TEMOVATE) 0.05 % external solution APPLY 5 DROPS TO PSORIASIS ON SCALP TWICE A DAY 2-3 DAYS A WEEK AS NEEDED FOR FLARE-UPS. AVOID FACE     • hydrochlorothiazide (HYDRODIURIL) 12.5 mg tablet TAKE 1 TABLET BY MOUTH EVERY DAY 90 tablet 1   • Januvia 50 MG tablet TAKE 1 TABLET BY MOUTH EVERY DAY 30 tablet 5   • Lancets (FREESTYLE) lancets Use as instructed (Patient taking differently: Use 1 each daily Twice a week) 100 each 0   • levETIRAcetam (KEPPRA) 500 mg tablet Take 3 tablets (1,500 mg total) by mouth every 12 (twelve) hours 180 tablet 11   • lisinopril (ZESTRIL) 40 mg tablet TAKE 1 TABLET BY MOUTH EVERY DAY 90 tablet 1   • OneTouch Ultra test strip CHECK BLOOD SUGARS DAILY 100 strip 15   • OXcarbazepine (TRILEPTAL) 600 mg tablet Take 1.5 tabs (900 mg) in the morning and 2 tabs (1200 mg) nightly. 105 tablet 11     No current facility-administered medications for this visit.         Allergies   Allergen Reactions   • Cat Hair Extract Allergic Rhinitis   • Metformin Vomiting   • Shellfish-Derived Products - Food Allergy Itching     Tickling on the back of the throat   • Lamotrigine Hives and Rash     burning   • Penicillins Rash       Review of Systems   Constitutional: Negative for appetite change, fatigue and fever. HENT: Negative. Negative for hearing loss, tinnitus, trouble swallowing and voice change. Eyes: Negative. Negative for photophobia, pain and visual disturbance. Respiratory: Negative. Negative for shortness of breath. Cardiovascular: Negative. Negative for palpitations. Gastrointestinal: Negative. Negative for nausea and vomiting. Endocrine: Negative. Negative for cold intolerance. Genitourinary: Negative. Negative for dysuria, frequency and urgency. Musculoskeletal: Negative for back pain, gait problem, myalgias and neck pain. Skin: Negative. Negative for rash. Allergic/Immunologic: Negative. Neurological: Negative. Negative for dizziness, tremors, seizures, syncope, facial asymmetry, speech difficulty, weakness, light-headedness, numbness and headaches. Hematological: Negative. Does not bruise/bleed easily. Psychiatric/Behavioral: Negative. Negative for confusion, hallucinations and sleep disturbance. All other systems reviewed and are negative. Video Exam    There were no vitals filed for this visit.     Physical Exam     Visit Time  Total Visit Duration: 15 min interventions: Dry Needling, Aquatic Therapy, Cervical/Lumbar Traction, Electrical Stimulation TENS, Gait Training, Manual Therapy, Moist Heat/ Ice, Neuromuscular Re-ed, Orthotic Management and Training, Patient Education, Self Care, Therapeutic Activites, Therapeutic Exercise and Ultrasound.     Antonia Ca, PT

## 2023-10-08 ENCOUNTER — PATIENT MESSAGE (OUTPATIENT)
Dept: NEUROLOGY | Facility: CLINIC | Age: 73
End: 2023-10-08
Payer: COMMERCIAL

## 2023-10-08 ENCOUNTER — PATIENT MESSAGE (OUTPATIENT)
Dept: ORTHOPEDICS | Facility: CLINIC | Age: 73
End: 2023-10-08
Payer: COMMERCIAL

## 2023-10-08 DIAGNOSIS — G43.909 MIGRAINE WITHOUT STATUS MIGRAINOSUS, NOT INTRACTABLE, UNSPECIFIED MIGRAINE TYPE: ICD-10-CM

## 2023-10-10 ENCOUNTER — OFFICE VISIT (OUTPATIENT)
Dept: OPTOMETRY | Facility: CLINIC | Age: 73
End: 2023-10-10
Payer: COMMERCIAL

## 2023-10-10 DIAGNOSIS — Z96.1 PSEUDOPHAKIA OF BOTH EYES: ICD-10-CM

## 2023-10-10 DIAGNOSIS — H18.452 SALZMANN'S NODULAR DEGENERATION OF CORNEA OF LEFT EYE: ICD-10-CM

## 2023-10-10 DIAGNOSIS — H04.123 DRY EYE SYNDROME, BILATERAL: Primary | ICD-10-CM

## 2023-10-10 DIAGNOSIS — H52.4 PRESBYOPIA: ICD-10-CM

## 2023-10-10 DIAGNOSIS — H02.402 PTOSIS OF LEFT EYELID: ICD-10-CM

## 2023-10-10 DIAGNOSIS — G47.30 SLEEP APNEA, UNSPECIFIED TYPE: ICD-10-CM

## 2023-10-10 PROCEDURE — 1157F ADVNC CARE PLAN IN RCRD: CPT | Mod: CPTII,S$GLB,, | Performed by: OPTOMETRIST

## 2023-10-10 PROCEDURE — 99999 PR PBB SHADOW E&M-EST. PATIENT-LVL III: CPT | Mod: PBBFAC,,, | Performed by: OPTOMETRIST

## 2023-10-10 PROCEDURE — 99999 PR PBB SHADOW E&M-EST. PATIENT-LVL III: ICD-10-PCS | Mod: PBBFAC,,, | Performed by: OPTOMETRIST

## 2023-10-10 PROCEDURE — 1101F PT FALLS ASSESS-DOCD LE1/YR: CPT | Mod: CPTII,S$GLB,, | Performed by: OPTOMETRIST

## 2023-10-10 PROCEDURE — 1157F PR ADVANCE CARE PLAN OR EQUIV PRESENT IN MEDICAL RECORD: ICD-10-PCS | Mod: CPTII,S$GLB,, | Performed by: OPTOMETRIST

## 2023-10-10 PROCEDURE — 3288F FALL RISK ASSESSMENT DOCD: CPT | Mod: CPTII,S$GLB,, | Performed by: OPTOMETRIST

## 2023-10-10 PROCEDURE — 1126F AMNT PAIN NOTED NONE PRSNT: CPT | Mod: CPTII,S$GLB,, | Performed by: OPTOMETRIST

## 2023-10-10 PROCEDURE — 99212 OFFICE O/P EST SF 10 MIN: CPT | Mod: S$GLB,,, | Performed by: OPTOMETRIST

## 2023-10-10 PROCEDURE — 1126F PR PAIN SEVERITY QUANTIFIED, NO PAIN PRESENT: ICD-10-PCS | Mod: CPTII,S$GLB,, | Performed by: OPTOMETRIST

## 2023-10-10 PROCEDURE — 3044F PR MOST RECENT HEMOGLOBIN A1C LEVEL <7.0%: ICD-10-PCS | Mod: CPTII,S$GLB,, | Performed by: OPTOMETRIST

## 2023-10-10 PROCEDURE — 3044F HG A1C LEVEL LT 7.0%: CPT | Mod: CPTII,S$GLB,, | Performed by: OPTOMETRIST

## 2023-10-10 PROCEDURE — 99212 PR OFFICE/OUTPT VISIT, EST, LEVL II, 10-19 MIN: ICD-10-PCS | Mod: S$GLB,,, | Performed by: OPTOMETRIST

## 2023-10-10 PROCEDURE — 1159F MED LIST DOCD IN RCRD: CPT | Mod: CPTII,S$GLB,, | Performed by: OPTOMETRIST

## 2023-10-10 PROCEDURE — 3288F PR FALLS RISK ASSESSMENT DOCUMENTED: ICD-10-PCS | Mod: CPTII,S$GLB,, | Performed by: OPTOMETRIST

## 2023-10-10 PROCEDURE — 1160F RVW MEDS BY RX/DR IN RCRD: CPT | Mod: CPTII,S$GLB,, | Performed by: OPTOMETRIST

## 2023-10-10 PROCEDURE — 1159F PR MEDICATION LIST DOCUMENTED IN MEDICAL RECORD: ICD-10-PCS | Mod: CPTII,S$GLB,, | Performed by: OPTOMETRIST

## 2023-10-10 PROCEDURE — 1160F PR REVIEW ALL MEDS BY PRESCRIBER/CLIN PHARMACIST DOCUMENTED: ICD-10-PCS | Mod: CPTII,S$GLB,, | Performed by: OPTOMETRIST

## 2023-10-10 PROCEDURE — 1101F PR PT FALLS ASSESS DOC 0-1 FALLS W/OUT INJ PAST YR: ICD-10-PCS | Mod: CPTII,S$GLB,, | Performed by: OPTOMETRIST

## 2023-10-10 NOTE — PROGRESS NOTES
HPI    DLS: 5/08/2023  F/u    Pt is here for dry eye f/u and Rx recheck. Pt states that he doesn't feel   any symptoms of dry eyes. Pt states that he can see better without his   glasses. Pt didn't have a good reaction to tyrvaya.     GTTS:  Theratears PRN    Last edited by Loren Vale MA on 10/10/2023  9:30 AM.            Assessment /Plan     For exam results, see Encounter Report.    Dry eye syndrome, bilateral     Start: as needed:  perfluorohexyloctane, PF, 100 % Drop; Place 1 drop into both eyes 4 (four) times daily.  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

## 2023-10-11 ENCOUNTER — IMMUNIZATION (OUTPATIENT)
Dept: INTERNAL MEDICINE | Facility: CLINIC | Age: 73
End: 2023-10-11
Payer: COMMERCIAL

## 2023-10-11 PROCEDURE — 90471 FLU VACCINE - QUADRIVALENT - ADJUVANTED: ICD-10-PCS | Mod: S$GLB,,, | Performed by: INTERNAL MEDICINE

## 2023-10-11 PROCEDURE — 90694 VACC AIIV4 NO PRSRV 0.5ML IM: CPT | Mod: S$GLB,,, | Performed by: INTERNAL MEDICINE

## 2023-10-11 PROCEDURE — 90694 FLU VACCINE - QUADRIVALENT - ADJUVANTED: ICD-10-PCS | Mod: S$GLB,,, | Performed by: INTERNAL MEDICINE

## 2023-10-11 PROCEDURE — 90471 IMMUNIZATION ADMIN: CPT | Mod: S$GLB,,, | Performed by: INTERNAL MEDICINE

## 2023-10-16 NOTE — PROGRESS NOTES
"  [facility]  Occupational Therapy Treatment Note     Date: 8/7/2023  Name: Raffy Rutherford Jr.  Clinic Number: [id number]    Therapy Diagnosis:   Encounter Diagnosis   Name Primary?    Pain Yes       Physician: GEORGIANA Up MD      Physician Orders: Eval and Treat  Medical Diagnosis: M25.512,G89.29 (ICD-10-CM) - Chronic left shoulder pain  Evaluation Date: 7/10/2023  Insurance Authorization Period Expiration: 7/09/2024  Plan of Care Certification Period: 8 weeks; 9/8/2023  Date of Return to MD: TBD  Visit # / Visits authorized: 1 / 1  FOTO: 1/3     Precautions:  Standard     Time In: 12:05 PM   Time Out: 01:00 PM   Total Billable Time: 55 minutes    Subjective     Pt reports: "I have this knot right here."  He was compliant with home exercise program given last session.   Response to previous treatment: good  Functional change: traditional cello       Pain: 2/10  Location: left shoulder      Objective   Observation/Appearance:         Hand ROM. Measured in degrees.    7/10/2023 7/10/2023     Right  Left        Able to make full composite fist       Shoulder ROM:   WNL flexion, abduction, ER, IR   - Pain between  abduction   - Pain with end range flexion   - Pain with ER 20-45          Strength (Dyanmometer) and Pinch Strength (Pinch Gauge)  Measured in pounds and psi. Average of three trials.    7/10/2023 7/10/2023     Right  Left    Rung II deferred     Key Pinch       3pt Pinch       2pt Pinch                Intake Outcome Measure for FOTO initial eval;  Survey     Therapist reviewed FOTO scores for Raffy Rutherford Jr. on 7/10/2023.   FOTO documents entered into [employer] - see Media section.     Intake Score:    needs to be taken %          Treatment     Raffy received the treatments listed below:        therapeutic exercises to develop strength, endurance, and posture for 45 minutes including:  - isometric ER/IR with yellow band x 10 reps (2 sets ) each   - show me the money " Received request via: Patient    Was the patient seen in the last year in this department? Yes    Does the patient have an active prescription (recently filled or refills available) for medication(s) requested? No     with yellow rubberband x 10 reps (2 sets)  - rows with yelow theraband x 10 reps (2 sets)  - CW/CCW ball on wall x 10 each (3 sets)  - serratus foam roll x 10 reps on wall       manual therapy techniques: Myofacial release were applied to the: L deltoid/bicpeps for 8 minutes, including: NT  Trigger point release   Soft tissue anterior/lateral arm          hot pack for 10 minutes to L shoulder.        Patient Education and Home Exercises     Education provided:   - new HEP exercises, will use Needle HR for video reference   - Progress towards goals     Written Home Exercises Provided: yes.  Exercises were reviewed and Raffy was able to demonstrate them prior to the end of the session.  Raffy demonstrated fair  understanding of the HEP provided. See EMR under Patient Instructions for exercises provided during therapy sessions.       Assessment     Good spencer to tx today. No pain.      Raffy is progressing well towards his goals and there are no updates to goals at this time. Pt prognosis is Fair.     Pt will continue to benefit from skilled outpatient occupational therapy to address the deficits listed in the problem list on initial evaluation provide pt/family education and to maximize pt's level of independence in the home and community environment.     Pt's spiritual, cultural and educational needs considered and pt agreeable to plan of care and goals.    Anticipated barriers to occupational therapy:     Goals:  Long Term Goals (LTGs); to be met by discharge.  Pt will demo improved  strength by 5 pounds   Pt will demo improved shoulder strengthen by measuring 4/5 MMT   Pt will report pain level no greater than 2/10 during functional daily work and community activities   Pt will report decreased limitation score on FOTO based on predicted value.         Short Term Goals (STGs); to be met within 4 weeks.  Assess  strength   Pt will report pain level no greater than 2-3/10 during light activity   Pt will  report/demo compliance with HEP and pain reduction modifications      Plan     Updates/Grading for next session:     Vickie Perkins OT   8/7/2023

## 2023-10-17 ENCOUNTER — PATIENT MESSAGE (OUTPATIENT)
Dept: OPTOMETRY | Facility: CLINIC | Age: 73
End: 2023-10-17
Payer: COMMERCIAL

## 2023-10-17 ENCOUNTER — PATIENT MESSAGE (OUTPATIENT)
Dept: INTERNAL MEDICINE | Facility: CLINIC | Age: 73
End: 2023-10-17
Payer: COMMERCIAL

## 2023-10-17 RX ORDER — LOTEPREDNOL ETABONATE 5 MG/ML
1 SUSPENSION/ DROPS OPHTHALMIC 4 TIMES DAILY
Qty: 5 ML | Refills: 0 | Status: SHIPPED | OUTPATIENT
Start: 2023-10-17 | End: 2023-10-28

## 2023-10-17 RX ORDER — HYDROCODONE BITARTRATE AND ACETAMINOPHEN 5; 325 MG/1; MG/1
1 TABLET ORAL EVERY 8 HOURS PRN
Qty: 90 TABLET | Refills: 0 | Status: SHIPPED | OUTPATIENT
Start: 2023-10-17

## 2023-10-17 NOTE — TELEPHONE ENCOUNTER
No care due was identified.  [facility] Embedded Care Due Messages. Reference number: [id number].   10/17/2023 12:49:01 PM CDT

## 2023-10-20 ENCOUNTER — PATIENT MESSAGE (OUTPATIENT)
Dept: NEUROLOGY | Facility: CLINIC | Age: 73
End: 2023-10-20
Payer: COMMERCIAL

## 2023-10-21 NOTE — TELEPHONE ENCOUNTER
No care due was identified.  [facility] Embedded Care Due Messages. Reference number: [id number].   10/21/2023 3:54:28 PM CDT

## 2023-10-22 ENCOUNTER — PATIENT MESSAGE (OUTPATIENT)
Dept: INTERNAL MEDICINE | Facility: CLINIC | Age: 73
End: 2023-10-22
Payer: COMMERCIAL

## 2023-10-22 RX ORDER — HYDROCODONE BITARTRATE AND ACETAMINOPHEN 5; 325 MG/1; MG/1
1 TABLET ORAL EVERY 8 HOURS PRN
Qty: 90 TABLET | Refills: 0 | OUTPATIENT
Start: 2023-10-22

## 2023-10-24 RX ORDER — BUTALBITAL, ACETAMINOPHEN AND CAFFEINE 50; 325; 40 MG/1; MG/1; MG/1
TABLET ORAL
Qty: 30 TABLET | Refills: 0 | Status: SHIPPED | OUTPATIENT
Start: 2023-10-24 | End: 2023-11-09 | Stop reason: SDUPTHER

## 2023-10-31 NOTE — PROGRESS NOTES
Raffy Rutherford  presents for follow up evaluation of   Encounter Diagnosis   Name Primary?    Dupuytren's contracture of right hand Yes       Patient is 11 weeks 5days s/p right small and long finger xiaflex injection and manipulation for dupuytrens. Pain is 0/10, he has returned to his normal activities at this time. He has been compliant with bracing.    Surgical History:  4.7.22 left EPL and EPB primary repair zone T3, I&D, wound exploration and closure  5.18.21 Right Carpal tunnel release   12.16.20 Left cubital tunnel release and Left carpal tunnel release Dr. Bauer    PE:    AA&O x 4.  NAD  HEENT:  NCAT, sclera nonicteric  Lungs:  Respirations are equal and unlabored.  CV:  2+ bilateral upper and lower extremity pulses.  MSK:  Neurovascularly intact bilaterally.  5/5 thenar and intrinsic musculature strength.  Full range of motion hands, wrists and elbows.    X-rays AP, lateral and oblique bilateral hands taken today are independently reviewed by me and shows Eaton stage II basilar thumb arthritis.     A/P: Status post above, doing well  1) Continue with strengthening and activity as tolerated  2) F/U prn  3) Call with any questions/concerns in the interim        Kaye Solis MD    Please be aware that this note has been generated with the assistance of [facility] voice-to-text.  Please excuse any spelling or grammatical errors.

## 2023-11-02 DIAGNOSIS — M79.642 BILATERAL HAND PAIN: Primary | ICD-10-CM

## 2023-11-02 DIAGNOSIS — M79.641 BILATERAL HAND PAIN: Primary | ICD-10-CM

## 2023-11-06 ENCOUNTER — OFFICE VISIT (OUTPATIENT)
Dept: ORTHOPEDICS | Facility: CLINIC | Age: 73
End: 2023-11-06
Payer: COMMERCIAL

## 2023-11-06 ENCOUNTER — HOSPITAL ENCOUNTER (OUTPATIENT)
Dept: RADIOLOGY | Facility: HOSPITAL | Age: 73
Discharge: HOME | End: 2023-11-06
Attending: ORTHOPAEDIC SURGERY
Payer: COMMERCIAL

## 2023-11-06 DIAGNOSIS — M79.641 BILATERAL HAND PAIN: ICD-10-CM

## 2023-11-06 DIAGNOSIS — M79.642 BILATERAL HAND PAIN: ICD-10-CM

## 2023-11-06 DIAGNOSIS — M72.0 DUPUYTREN'S CONTRACTURE OF RIGHT HAND: Primary | ICD-10-CM

## 2023-11-06 PROCEDURE — 73130 X-RAY EXAM OF HAND: CPT | Mod: TC,50,PO

## 2023-11-06 PROCEDURE — 99999 PR PBB SHADOW E&M-EST. PATIENT-LVL III: CPT | Mod: PBBFAC,,, | Performed by: ORTHOPAEDIC SURGERY

## 2023-11-06 PROCEDURE — 1159F MED LIST DOCD IN RCRD: CPT | Mod: CPTII,S$GLB,, | Performed by: ORTHOPAEDIC SURGERY

## 2023-11-06 PROCEDURE — 3044F PR MOST RECENT HEMOGLOBIN A1C LEVEL <7.0%: ICD-10-PCS | Mod: CPTII,S$GLB,, | Performed by: ORTHOPAEDIC SURGERY

## 2023-11-06 PROCEDURE — 1160F RVW MEDS BY RX/DR IN RCRD: CPT | Mod: CPTII,S$GLB,, | Performed by: ORTHOPAEDIC SURGERY

## 2023-11-06 PROCEDURE — 99213 PR OFFICE/OUTPT VISIT, EST, LEVL III, 20-29 MIN: ICD-10-PCS | Mod: S$GLB,,, | Performed by: ORTHOPAEDIC SURGERY

## 2023-11-06 PROCEDURE — 1160F PR REVIEW ALL MEDS BY PRESCRIBER/CLIN PHARMACIST DOCUMENTED: ICD-10-PCS | Mod: CPTII,S$GLB,, | Performed by: ORTHOPAEDIC SURGERY

## 2023-11-06 PROCEDURE — 73130 XR HAND COMPLETE 3 VIEWS BILATERAL: ICD-10-PCS | Mod: 26,50,, | Performed by: RADIOLOGY

## 2023-11-06 PROCEDURE — 1157F ADVNC CARE PLAN IN RCRD: CPT | Mod: CPTII,S$GLB,, | Performed by: ORTHOPAEDIC SURGERY

## 2023-11-06 PROCEDURE — 3288F FALL RISK ASSESSMENT DOCD: CPT | Mod: CPTII,S$GLB,, | Performed by: ORTHOPAEDIC SURGERY

## 2023-11-06 PROCEDURE — 99213 OFFICE O/P EST LOW 20 MIN: CPT | Mod: S$GLB,,, | Performed by: ORTHOPAEDIC SURGERY

## 2023-11-06 PROCEDURE — 73130 X-RAY EXAM OF HAND: CPT | Mod: 26,50,, | Performed by: RADIOLOGY

## 2023-11-06 PROCEDURE — 3044F HG A1C LEVEL LT 7.0%: CPT | Mod: CPTII,S$GLB,, | Performed by: ORTHOPAEDIC SURGERY

## 2023-11-06 PROCEDURE — 1159F PR MEDICATION LIST DOCUMENTED IN MEDICAL RECORD: ICD-10-PCS | Mod: CPTII,S$GLB,, | Performed by: ORTHOPAEDIC SURGERY

## 2023-11-06 PROCEDURE — 1157F PR ADVANCE CARE PLAN OR EQUIV PRESENT IN MEDICAL RECORD: ICD-10-PCS | Mod: CPTII,S$GLB,, | Performed by: ORTHOPAEDIC SURGERY

## 2023-11-06 PROCEDURE — 1101F PT FALLS ASSESS-DOCD LE1/YR: CPT | Mod: CPTII,S$GLB,, | Performed by: ORTHOPAEDIC SURGERY

## 2023-11-06 PROCEDURE — 1101F PR PT FALLS ASSESS DOC 0-1 FALLS W/OUT INJ PAST YR: ICD-10-PCS | Mod: CPTII,S$GLB,, | Performed by: ORTHOPAEDIC SURGERY

## 2023-11-06 PROCEDURE — 99999 PR PBB SHADOW E&M-EST. PATIENT-LVL III: ICD-10-PCS | Mod: PBBFAC,,, | Performed by: ORTHOPAEDIC SURGERY

## 2023-11-06 PROCEDURE — 3288F PR FALLS RISK ASSESSMENT DOCUMENTED: ICD-10-PCS | Mod: CPTII,S$GLB,, | Performed by: ORTHOPAEDIC SURGERY

## 2023-11-09 ENCOUNTER — PROCEDURE VISIT (OUTPATIENT)
Dept: NEUROLOGY | Facility: CLINIC | Age: 73
End: 2023-11-09
Payer: COMMERCIAL

## 2023-11-09 VITALS — HEART RATE: 67 BPM | DIASTOLIC BLOOD PRESSURE: 68 MMHG | SYSTOLIC BLOOD PRESSURE: 103 MMHG

## 2023-11-09 DIAGNOSIS — G43.709 CHRONIC MIGRAINE WITHOUT AURA WITHOUT STATUS MIGRAINOSUS, NOT INTRACTABLE: Primary | ICD-10-CM

## 2023-11-09 DIAGNOSIS — G43.909 MIGRAINE WITHOUT STATUS MIGRAINOSUS, NOT INTRACTABLE, UNSPECIFIED MIGRAINE TYPE: ICD-10-CM

## 2023-11-09 PROCEDURE — 99499 NO LOS: ICD-10-PCS | Mod: S$GLB,,, | Performed by: NURSE PRACTITIONER

## 2023-11-09 PROCEDURE — 64615 CHEMODENERV MUSC MIGRAINE: CPT | Mod: S$GLB,,, | Performed by: NURSE PRACTITIONER

## 2023-11-09 PROCEDURE — 99499 UNLISTED E&M SERVICE: CPT | Mod: S$GLB,,, | Performed by: NURSE PRACTITIONER

## 2023-11-09 PROCEDURE — 64615 PR CHEMODENERVATION OF MUSCLE FOR CHRONIC MIGRAINE: ICD-10-PCS | Mod: S$GLB,,, | Performed by: NURSE PRACTITIONER

## 2023-11-09 RX ORDER — BUTALBITAL, ACETAMINOPHEN AND CAFFEINE 50; 325; 40 MG/1; MG/1; MG/1
TABLET ORAL
Qty: 30 TABLET | Refills: 0 | Status: SHIPPED | OUTPATIENT
Start: 2023-11-09 | End: 2024-02-26 | Stop reason: SDUPTHER

## 2023-11-09 NOTE — PROCEDURES
SUBJECTIVE:  Patient ID: Raffy Rutherford Jr.  Chief Complaint: Follow-up and Botulinum Toxin Injection    History of Present Illness:  Raffy Rutherford Jr. is a 73 y.o. male who presents to clinic alone for follow-up of headaches and Botox injections.     Recommendations made at last Office Visit on 8/24/23:  - Botox administered in clinic for Chronic Migraine (see below)   - For migraine prevention - continue ajovy 225 mg SQ monthly   - For acute migraine - will retry ubrelvy 100 mg   - Triptans contraindicated given daily MAOI transdermal patch   - For rescue - has fioricet available  - RTC in 12 weeks for repeat Botox injections or sooner if needed     11/09/2023- Interval History:  Has found perfume scents continue to be a significant trigger for him, as well as lack of sleep.  Patient has continued to achieve a greater than 50% reduction in the frequency of their migraines with continued Botox injections.  Wishes to proceed with today's injections as scheduled.     Otherwise, information below is still accurate and current.     08/24/2023- Interval History:  Migraines continue to be largely well controlled with combination Botox and Ajovy, he is currently three weeks overdue for his Botox and has had more frequent headaches/migraines during this time, indicating to him Botox is effective.  He has not taken any doses fioricet since last visit.  The Botox injections have achieved well over 50%  improvement in the patient's symptoms. Migraines have been reduced at least 7 days per month and the number of cumulative hours suffering with headaches has been reduced at least 100 total hours per month. Today he does have a headache indicating that the Botox has worn off. Frequency of treatment is every 12 weeks unless no response to the treatments, at which time we will discontinue the injections.     Otherwise, information below is still accurate and current.      05/08/2023- Interval History:  Has seen a noticeable  improvement in his migraines since starting Botox injections, was down to 1-2 migraines per week, had a few weeks with no migraines.  Migraines were easily managed with over the counter medications.  Began to notice an uptick in migraine frequency 3 weeks ago, sent message via patient portal, was restarted on ajovy 225 mg SQ monthly.  Feels migraines have leveled out since restarting ajovy.  Denies presence of side effects to Botox, is pleased with the results from first round of Botox, does wish to proceed with second round of Botox as scheduled today.      Otherwise, information below is still accurate and current.      02/13/2023- Interval History:  Headaches continue to occur on a near daily basis with full blown migraines on 12-15 days per month.  He started Ajovy and recently injected third injection, unsure whether or not ajovy was helping.  Seen by outside pain management provider who recommended and prescribed qulipta 10 mg daily, dose increased to 30 mg daily.  For a few days after increasing dose to 30 mg, began to experience issues with insomnia, unsure if this was related to qulipta vs ajovy or combination, but he does feel migraines were better after increasing dose to 30 mg.    Risks, Benefits, and potential side effects of Botox discussed with patient.  Alternative treatments offered.  After thorough discussed regarding the procedure, patient has decided to move forward with initiating Botox injections for Chronic Migraine.  Patient understands we will complete 3 rounds of injections, if after 3 rounds, we do not see a 50% reduction in headaches, we will discontinue Botox injections.      Otherwise, information below is still accurate and current.      History of Present Illness:   72 y.o. male with headaches, chronic LBP, cervical radiculopathy, anxiety, depression, WINNIE, hx of prostate cancer, who presents for evaluation of headaches via virtual visit.  Patient is established with neurology clinic,  previous patient of DESTINY Norman and Dr. Millan, he is a new patient to me.    Currently suffering with headaches at least 1-2 times per week each of which last 2-4 days in duration.  Long history of headahces, beginning around 20 yo, had multiple sinus surgeries thinking headaches were sinus related, no improvement.       Headaches are described as a moderate to severe, stabbing/searing pain beginning left retro-orbitalis, extending towards the occipitalis and into his shoulder/left arm/hand.  Headaches can last anywhere from an hour when successfully treated with fioricet or excedrin, but can last up to 4 days in duration, on average headaches are lasting about a day in duration.  Headaches are often present upon waking.   Associated symptoms include fatigue, crepitus in left shoulder, congested (on left side), more trouble concentrating than norm, photophobia, phonophobia.  Currently experiencing some sort of headache on 20-25/30 days, with migraines occurring on 12-15/30 days.    At last visit with DESTINY Norman, she restarted nurtec 75 mg odt and referred him to me for consideration of Botox. Nurtec every other day was ineffective (though was effective in the past for him).  He was then switched to ajovy 225 mg SQ monthly, was waiting for today's visit to decide whether or not to start using ajovy.   Takes lyrica as prescribed by outside pain management, for chronic back pain.   He is prescribed lamotrigine 200 mg daily for anxiety/depression.  Takes trazodone 50 mg nightly for insomnia.      Notes from DESTINY Norman:  HPI:   Mr. Raffy Rutherford Jr. is a 72 y.o. male presenting for evaluation regarding chronic low back pain. On chart review he was previously seen by Dr. Millan for both bilateral neuropathy and headache pain. He received an EMG with Dr. Millan on 03/15/2022 which showed evidence of L5/S1 radiculopahties, worse on the right; and moderate chronic reinnervation changes noted in the R vastus medialis. His current therapy  regimen is Lyrica 200mg QHS. He notes he was previously seen by neurosurgery and instructed to receive an MRI lumbar spine by Dr. Caldwell but was unsure why this was ordered. We discussed that this is likely due to the evidence on the EMG indicating his bilateral foot drop may be due to a lumbar spine issue at L5/S1 level.      Headache History:   Onset- Teenager   Frequency- 5-6 per week  Duration- All day   Location- L eye and radiates to his L jaw and then toward his L shoulder and arm  Associated symptoms- Nasal blockage on the L side, + photophobia,   Denies any aura, denies nausea/vomiting   Alleviating Factors- Half Fioricet can occasionally relieve his headaches  Aggravating Factors- Smells trigger his headaches and stress/ poor sleep, weather      Of note- patient went to [place] and found in the 3 weeks he was there he only had 2-3 headaches which were easily relieved     He states that he is also concerned regarding his headaches for which he is currently taking Ubrelvy QOD and Fioricet for abortive therapy.       Headache:  Type: iron hot stab like pain   Severity: 9/10  Location: left eye, forehead , cheek into left shoulder and into the back   Triggers: Particular scents, looking at screen for a long time   Frequency: worse in the last week, but otherwise 8-9/month   Duration:  Days if he does not take medication (2-3 days) can have a cycles of headaches for several weeks , sometimes can go 4 weeks without headache   Aura: none   Photophobia: not as much but does endorse turning the lights off or worsening of headache when he looks at his phone  Phonophobia: ++  Nausea/ vomiting: rarely gets nauseous   Aggravating factors: none   Relieving factors: Medications  Inhales an oil- mydab which has given him significant benefit  Fioricet- none   Ubrelvy- worked okay but Nurtec works better  Voltaren- he takes this for joint pains.   Excedrin- none   Aspirin makes tinnitus worse   Norco- 3 times a day - 1/2  pill Norco in am , 1/2 pill in the afternoon 1/2 + 3/4th pill in the evening. Pain worsens when he tries to taper off his pain medication  He snores at night, he has sleep apnea, he was on CPAP but not very compliant because he states he took the mask off in his sleep. He has tried different masks with no benefit.      Treatments Tried and Response  Nurtec  lyrica  Atogepant  Baclofen   Fioricet  Celebrex  Emgality 09/08/2020 failed  Aimovig  Fioricet   Diamox   Cymbalta   Klonopin   Valium   [place]  Lamictal   Gabapentin   Indometacin   Dilaudid  Verapamil  Nurtec   Ubrelvy   Trazodone  Ajovy   Botox - helping after first round  Qulipta 60 mg - no, caused insomnia   Ajovy - helping     Current Medications:    Current Outpatient Medications:     atorvastatin (LIPITOR) 40 MG tablet, Take 1 tablet (40 mg total) by mouth once daily., Disp: 90 tablet, Rfl: 1    butalbital-acetaminophen-caffeine -40 mg (FIORICET, ESGIC) -40 mg per tablet, Take 1 tablet by mouth daily as needed for 30 days., Disp: 30 tablet, Rfl: 0    celecoxib (CELEBREX) 100 MG capsule, Take 2 capsules (200 mg total) by mouth 2 (two) times daily., Disp: 180 capsule, Rfl: 3    clindamycin (CLEOCIN) 150 MG capsule, Take 4 capsules (600 mg total) by mouth 1 hour prior to dental appointment., Disp: 12 capsule, Rfl: 1    clonazePAM (KLONOPIN) 0.5 MG tablet, Take 1 tablet by mouth twice a day as needed for anxiety, Disp: 60 tablet, Rfl: 5    ergocalciferol (VITAMIN D2) 50,000 unit Cap, Take 1 capsule (50,000 Units total) by mouth every 7 days., Disp: 12 capsule, Rfl: 3    fremanezumab-vfrm (AJOVY SYRINGE) 225 mg/1.5 mL injection, Inject 1.5 mLs (225 mg total) into the skin every 28 days., Disp: 1.5 mL, Rfl: 5    HYDROcodone-acetaminophen (NORCO) 5-325 mg per tablet, Take 1 tablet by mouth every 8 (eight) hours as needed for Pain., Disp: 90 tablet, Rfl: 0    lamoTRIgine (LAMICTAL) 200 MG tablet, Take 1 tablet (200 mg total) by mouth once daily.,  Disp: 90 tablet, Rfl: 3    perfluorohexyloctane, PF, 100 % Drop, Place 1 drop into both eyes 4 (four) times daily., Disp: 3 mL, Rfl: 11    pregabalin (LYRICA) 225 MG Cap, Take 1 capsule (225 mg total) by mouth every evening., Disp: 93 capsule, Rfl: 3    pregabalin (LYRICA) 25 MG capsule, Take 2 capsules (50 mg total) by mouth once daily., Disp: 90 capsule, Rfl: 1    pregabalin (LYRICA) 25 MG capsule, Take 1 capsule (25 mg total) by mouth 2 (two) times a day., Disp: 180 capsule, Rfl: 0    pregabalin (LYRICA) 75 MG capsule, Take 3 capsules by mouth at night, Disp: 90 capsule, Rfl: 1    RABEprazole (ACIPHEX) 20 mg tablet, Take 1 tablet (20 mg total) by mouth 2 (two) times daily., Disp: 180 tablet, Rfl: 3    selegiline (EMSAM) 9 mg/24 hr, Place 1 patch onto the skin once daily., Disp: 30 patch, Rfl: 11    sildenafiL (VIAGRA) 100 MG tablet, Take 1 tablet (100 mg total) by mouth as needed for Erectile Dysfunction (take 30-60 minutes before on empty stomach). Take one hour before., Disp: 4 tablet, Rfl: 12    tadalafiL (CIALIS) 10 MG tablet, Take 1 tablet (10 mg total) by mouth daily as needed for Erectile Dysfunction., Disp: 30 tablet, Rfl: 11    traZODone (DESYREL) 50 MG tablet, Take 1 tablet (50 mg total) by mouth every evening., Disp: 90 tablet, Rfl: 1    ubrogepant (UBRELVY) 100 mg tablet, Take 1 tablet (100 mg total) by mouth every 2 (two) hours as needed for Migraine. Max 200 mg/day., Disp: 16 tablet, Rfl: 2    varenicline (TYRVAYA) 0.03 mg/spray sprm, 1 spray by Each Nostril route 2 (two) times a day., Disp: 8.4 mL, Rfl: 11    Current Facility-Administered Medications:     onabotulinumtoxina injection 200 Units, 200 Units, Intramuscular, q12 weeks, Macie Pearson FNP, 200 Units at 08/24/23 1138    Review of Systems - A review of 10+ systems was conducted with pertinent positive and negative findings documented in HPI with all other systems reviewed and negative.    PFSH: Past medical, family, and social  history reviewed as documented in chart with pertinent positive medical, family, and social history detailed in HPI.    OBJECTIVE:  Vitals:  /68   Pulse 67     Physical Exam:  Constitutional: he appears well-developed and well-nourished. he is well groomed. NAD     Review of Data:   Notes from internal medicine reviewed.   Labs:  Office Visit on 09/01/2023   Component Date Value Ref Range Status    Final Pathologic Diagnosis 09/01/2023    Final                    Value:Skin, left frontal scalp, shave biopsy:  -SEBORRHEIC KERATOSIS WITH ACTINIC CHANGES, IRRITATED AND INFLAMED    This lesion is atypical and further treatment may be required.  You will be contacted by your provider's office.      Gross 09/01/2023    Final                    Value:Pathology ID:  [id number]  Patient ID:  [id number]  The specimen is received in formalin labeled &quot;left frontal scalp&quot;.  The specimen is a shave biopsy of tan skin measuring 0.6 x 0.5 cm .  The specimen is bisected,  inked blue at the resection margin and submitted entirely in cassette   [id number] Spivey      Microscopic Exam 09/01/2023    Final                    Value:Sections show a background of a seborrheic keratosis characterized by papillomatosis, hyperkeratosis, acanthosis, and underlying chronic inflammation in the upper dermis.  However, there are actinic changes characterized by keratinocyte pleomorphism and   dysmaturation.  The keratinocyte pleomorphism is overall confined to the basal layer of the epidermis.      Disclaimer 09/01/2023 Unless the case is a 'gross only' or additional testing only, the final diagnosis for each specimen is based on a microscopic examination of appropriate tissue sections.   Final   Lab Visit on 08/28/2023   Component Date Value Ref Range Status    Sodium 08/28/2023 137  136 - 145 mmol/L Final    Potassium 08/28/2023 4.7  3.5 - 5.1 mmol/L Final    Chloride 08/28/2023 102  95 - 110 mmol/L Final    CO2  08/28/2023 27  23 - 29 mmol/L Final    Glucose 08/28/2023 95  70 - 110 mg/dL Final    BUN 08/28/2023 11  8 - 23 mg/dL Final    Creatinine 08/28/2023 0.7  0.5 - 1.4 mg/dL Final    Calcium 08/28/2023 9.4  8.7 - 10.5 mg/dL Final    Total Protein 08/28/2023 7.0  6.0 - 8.4 g/dL Final    Albumin 08/28/2023 4.1  3.5 - 5.2 g/dL Final    Total Bilirubin 08/28/2023 0.5  0.1 - 1.0 mg/dL Final    Alkaline Phosphatase 08/28/2023 35 (L)  55 - 135 U/L Final    AST 08/28/2023 18  10 - 40 U/L Final    ALT 08/28/2023 20  10 - 44 U/L Final    eGFR 08/28/2023 >60.0  >60 mL/min/1.73 m^2 Final    Anion Gap 08/28/2023 8  8 - 16 mmol/L Final    WBC 08/28/2023 5.10  3.90 - 12.70 K/uL Final    RBC 08/28/2023 5.06  4.60 - 6.20 M/uL Final    Hemoglobin 08/28/2023 15.1  14.0 - 18.0 g/dL Final    Hematocrit 08/28/2023 47.1  40.0 - 54.0 % Final    MCV 08/28/2023 93  82 - 98 fL Final    MCH 08/28/2023 29.8  27.0 - 31.0 pg Final    MCHC 08/28/2023 32.1  32.0 - 36.0 g/dL Final    RDW 08/28/2023 13.2  11.5 - 14.5 % Final    Platelets 08/28/2023 231  150 - 450 K/uL Final    MPV 08/28/2023 9.9  9.2 - 12.9 fL Final    Immature Granulocytes 08/28/2023 0.2  0.0 - 0.5 % Final    Gran # (ANC) 08/28/2023 2.3  1.8 - 7.7 K/uL Final    Immature Grans (Abs) 08/28/2023 0.01  0.00 - 0.04 K/uL Final    Lymph # 08/28/2023 2.2  1.0 - 4.8 K/uL Final    Mono # 08/28/2023 0.5  0.3 - 1.0 K/uL Final    Eos # 08/28/2023 0.1  0.0 - 0.5 K/uL Final    Baso # 08/28/2023 0.04  0.00 - 0.20 K/uL Final    nRBC 08/28/2023 0  0 /100 WBC Final    Gran % 08/28/2023 45.7  38.0 - 73.0 % Final    Lymph % 08/28/2023 42.5  18.0 - 48.0 % Final    Mono % 08/28/2023 9.2  4.0 - 15.0 % Final    Eosinophil % 08/28/2023 1.6  0.0 - 8.0 % Final    Basophil % 08/28/2023 0.8  0.0 - 1.9 % Final    Differential Method 08/28/2023 Automated   Final    Cholesterol 08/28/2023 210 (H)  120 - 199 mg/dL Final    Triglycerides 08/28/2023 118  30 - 150 mg/dL Final    HDL 08/28/2023 68  40 - 75 mg/dL Final     LDL Cholesterol 08/28/2023 118.4  63.0 - 159.0 mg/dL Final    HDL/Cholesterol Ratio 08/28/2023 32.4  20.0 - 50.0 % Final    Total Cholesterol/HDL Ratio 08/28/2023 3.1  2.0 - 5.0 Final    Non-HDL Cholesterol 08/28/2023 142  mg/dL Final    TSH 08/28/2023 0.709  0.400 - 4.000 uIU/mL Final    Hemoglobin A1C 08/28/2023 5.5  4.0 - 5.6 % Final    Estimated Avg Glucose 08/28/2023 111  68 - 131 mg/dL Final    Hepatitis B Surface Ag 08/28/2023 Non-reactive  Non-reactive Final    Hep B C IgM 08/28/2023 Non-reactive  Non-reactive Final    Hep A IgM 08/28/2023 Non-reactive  Non-reactive Final    Hepatitis C Ab 08/28/2023 Non-reactive  Non-reactive Final   Lab Visit on 08/25/2023   Component Date Value Ref Range Status    PSA Diagnostic 08/25/2023 11.1 (H)  0.00 - 4.00 ng/mL Final   Lab Visit on 08/07/2023   Component Date Value Ref Range Status    PSA Diagnostic 08/07/2023 10.7 (H)  0.00 - 4.00 ng/mL Final   Lab Visit on 06/22/2023   Component Date Value Ref Range Status    WBC 06/22/2023 3.23 (L)  3.90 - 12.70 K/uL Final    RBC 06/22/2023 5.09  4.60 - 6.20 M/uL Final    Hemoglobin 06/22/2023 15.7  14.0 - 18.0 g/dL Final    Hematocrit 06/22/2023 46.3  40.0 - 54.0 % Final    MCV 06/22/2023 91  82 - 98 fL Final    MCH 06/22/2023 30.8  27.0 - 31.0 pg Final    MCHC 06/22/2023 33.9  32.0 - 36.0 g/dL Final    RDW 06/22/2023 11.9  11.5 - 14.5 % Final    Platelets 06/22/2023 190  150 - 450 K/uL Final    MPV 06/22/2023 9.6  9.2 - 12.9 fL Final    Immature Granulocytes 06/22/2023 0.6 (H)  0.0 - 0.5 % Final    Gran # (ANC) 06/22/2023 1.1 (L)  1.8 - 7.7 K/uL Final    Immature Grans (Abs) 06/22/2023 0.02  0.00 - 0.04 K/uL Final    Lymph # 06/22/2023 1.6  1.0 - 4.8 K/uL Final    Mono # 06/22/2023 0.4  0.3 - 1.0 K/uL Final    Eos # 06/22/2023 0.1  0.0 - 0.5 K/uL Final    Baso # 06/22/2023 0.02  0.00 - 0.20 K/uL Final    nRBC 06/22/2023 0  0 /100 WBC Final    Gran % 06/22/2023 34.4 (L)  38.0 - 73.0 % Final    Lymph % 06/22/2023 48.6 (H)  18.0 -  48.0 % Final    Mono % 06/22/2023 12.4  4.0 - 15.0 % Final    Eosinophil % 06/22/2023 3.4  0.0 - 8.0 % Final    Basophil % 06/22/2023 0.6  0.0 - 1.9 % Final    Platelet Estimate 06/22/2023 Appears normal   Final    Differential Method 06/22/2023 Automated   Final    Sodium 06/22/2023 136  136 - 145 mmol/L Final    Potassium 06/22/2023 3.5  3.5 - 5.1 mmol/L Final    Chloride 06/22/2023 100  95 - 110 mmol/L Final    CO2 06/22/2023 27  23 - 29 mmol/L Final    Glucose 06/22/2023 105  70 - 110 mg/dL Final    BUN 06/22/2023 7 (L)  8 - 23 mg/dL Final    Creatinine 06/22/2023 0.7  0.5 - 1.4 mg/dL Final    Calcium 06/22/2023 9.3  8.7 - 10.5 mg/dL Final    Total Protein 06/22/2023 7.1  6.0 - 8.4 g/dL Final    Albumin 06/22/2023 3.8  3.5 - 5.2 g/dL Final    Total Bilirubin 06/22/2023 0.4  0.1 - 1.0 mg/dL Final    Alkaline Phosphatase 06/22/2023 41 (L)  55 - 135 U/L Final    AST 06/22/2023 58 (H)  10 - 40 U/L Final    ALT 06/22/2023 107 (H)  10 - 44 U/L Final    eGFR 06/22/2023 >60.0  >60 mL/min/1.73 m^2 Final    Anion Gap 06/22/2023 9  8 - 16 mmol/L Final   Lab Visit on 06/12/2023   Component Date Value Ref Range Status    Arsenic 06/12/2023 <1  <13 ng/mL Final    Lead 06/12/2023 1.2  <5.0 mcg/dL Final    Cadmium 06/12/2023 0.3  <5.0 ng/mL Final    Mercury 06/12/2023 <1  <10 ng/mL Final    Venous/Capillary 06/12/2023 Test Not Performed   Final    Street Address 06/12/2023 Test Not Performed   Final    City 06/12/2023 Test Not Performed   Final    State 06/12/2023 Test Not Performed   Final    Zip 06/12/2023 Test Not Performed   Final    County 06/12/2023 Test Not Performed   Final    Guardian First Name 06/12/2023 Test Not Performed   Final    Guardian Last Name 06/12/2023 Test Not Performed   Final    Home Phone 06/12/2023 Test Not Performed   Final    Race 06/12/2023 Test Not Performed   Final    ROBERTO 06/12/2023 Negative <1:80  Negative <1:80 Final    Cytoplasmic Neutrophilic Ab 06/12/2023 <1:20  <1:20 Titer Final     Perinuclear (P-ANCA) 06/12/2023 <1:20  <1:20 Titer Final    Rheumatoid Factor 06/12/2023 <13.0  0.0 - 15.0 IU/mL Final     Imaging:  No results found. However, due to the size of the patient record, not all encounters were searched. Please check Results Review for a complete set of results.    Note: I have independently reviewed any/all imaging/labs/tests and agree with the report (s) as documented.  Any discrepancies will be as noted/demarcated by free text.  GARO BERGER 11/9/2023    ASSESSMENT:  1. Chronic migraine without aura without status migrainosus, not intractable    2. Migraine without status migrainosus, not intractable, unspecified migraine type      PLAN:  - Botox administered in clinic for Chronic Migraine (see below)   - For migraine prevention - continue ajovy 225 mg SQ monthly   - For acute migraine - ubrelvy 100 mg   - Trial lidocaine NS for treatment of acute migraines   - Triptans contraindicated given daily MAOI transdermal patch   - For rescue - has fioricet available  - RTC in 12 weeks for repeat Botox injections or sooner if needed     Orders Placed This Encounter    ubrogepant (UBRELVY) 100 mg tablet    butalbital-acetaminophen-caffeine -40 mg (FIORICET, ESGIC) -40 mg per tablet    UNABLE TO FIND     All questions and concerns addressed.  Patient verbalizes understanding and is agreeable with the above stated treatment plan.      PROCEDURE NOTE:  BOTOX was performed as an indicated therapy for intractable chronic migraine headaches given that the patient failed more than 2 headache medications    A time out was conducted just before the start of the procedure to verify the correct patient and procedure, procedure location, and all relevant critical information.     The Botox injections have achieved well over 50%  improvement in the patient's symptoms. Migraines have been reduced at least 7 days per month and the number of cumulative hours suffering with headaches has been reduced  at least 100 total hours per month. Today she does have a headache indicating that the Botox has worn off. Frequency of treatment is every 12 weeks unless no response to the treatments, at which time we will discontinue the injections.    PROCEDURE PERFORMED: Botulinum toxin injection (11632)  CLINICAL INDICATION: G43.719  After risks and benefits were explained including bleeding, infection, worsening of pain, damage to the areas being injected, weakness of muscles, loss of muscle control, dysphagia if injecting the head or neck, facial droop if injecting the facial area, painful injection, allergic or other reaction to the medications being injected, and the failure of the procedure to help the problem, a signed consent was obtained.   The patient was placed in a comfortable area and the sites to be treated were identified.The area to be treated was prepped three times with alcohol and the alcohol allowed to dry. Next, a 30 gauge needle was used to inject the medication in the area to be treated.      Total Botox used: 155 Units   Unavoidable waste: 45 Units     Injection sites:    muscle bilaterally ( a total of 10 units divided into 2 sites)   Procerus muscle (5 units)   Frontalis muscle bilaterally (a total of 20 units divided into 4 sites)   Temporalis muscle bilaterally (a total of 40 units divided into 8 sites)   Occipitalis muscle bilaterally (a total of 30 units divided into 6 sites)   Cervical paraspinal muscles (a total of 20 units divided into 4 sites)   Trapezius muscle bilaterally (a total of 30 units divided into 6 sites)   Complications: none   RTC for the next Botox injection: 12 weeks     CC: Haseeb Puentes MD Elizabeth C Vulevich, MIKAYLA-C  [facility] of Neurology   [phone]

## 2023-11-10 ENCOUNTER — PATIENT MESSAGE (OUTPATIENT)
Dept: NEUROLOGY | Facility: CLINIC | Age: 73
End: 2023-11-10
Payer: COMMERCIAL

## 2023-11-10 ENCOUNTER — PATIENT MESSAGE (OUTPATIENT)
Dept: ADMINISTRATIVE | Facility: OTHER | Age: 73
End: 2023-11-10
Payer: COMMERCIAL

## 2023-11-14 ENCOUNTER — PATIENT MESSAGE (OUTPATIENT)
Dept: NEUROLOGY | Facility: CLINIC | Age: 73
End: 2023-11-14
Payer: COMMERCIAL

## 2023-11-20 ENCOUNTER — PATIENT MESSAGE (OUTPATIENT)
Dept: OPTOMETRY | Facility: CLINIC | Age: 73
End: 2023-11-20
Payer: COMMERCIAL

## 2023-11-20 ENCOUNTER — PATIENT MESSAGE (OUTPATIENT)
Dept: OPHTHALMOLOGY | Facility: CLINIC | Age: 73
End: 2023-11-20
Payer: COMMERCIAL

## 2023-11-20 ENCOUNTER — OFFICE VISIT (OUTPATIENT)
Dept: OPTOMETRY | Facility: CLINIC | Age: 73
End: 2023-11-20
Payer: COMMERCIAL

## 2023-11-20 DIAGNOSIS — G47.30 SLEEP APNEA, UNSPECIFIED TYPE: ICD-10-CM

## 2023-11-20 DIAGNOSIS — H04.123 DRY EYE SYNDROME, BILATERAL: Primary | ICD-10-CM

## 2023-11-20 DIAGNOSIS — H18.452 SALZMANN'S NODULAR DEGENERATION OF CORNEA OF LEFT EYE: ICD-10-CM

## 2023-11-20 DIAGNOSIS — H02.889 MGD (MEIBOMIAN GLAND DISEASE), UNSPECIFIED LATERALITY: ICD-10-CM

## 2023-11-20 DIAGNOSIS — Z96.1 PSEUDOPHAKIA OF BOTH EYES: ICD-10-CM

## 2023-11-20 PROCEDURE — 1126F AMNT PAIN NOTED NONE PRSNT: CPT | Mod: CPTII,S$GLB,, | Performed by: OPTOMETRIST

## 2023-11-20 PROCEDURE — 3044F PR MOST RECENT HEMOGLOBIN A1C LEVEL <7.0%: ICD-10-PCS | Mod: CPTII,S$GLB,, | Performed by: OPTOMETRIST

## 2023-11-20 PROCEDURE — 3288F FALL RISK ASSESSMENT DOCD: CPT | Mod: CPTII,S$GLB,, | Performed by: OPTOMETRIST

## 2023-11-20 PROCEDURE — 1126F PR PAIN SEVERITY QUANTIFIED, NO PAIN PRESENT: ICD-10-PCS | Mod: CPTII,S$GLB,, | Performed by: OPTOMETRIST

## 2023-11-20 PROCEDURE — 1101F PT FALLS ASSESS-DOCD LE1/YR: CPT | Mod: CPTII,S$GLB,, | Performed by: OPTOMETRIST

## 2023-11-20 PROCEDURE — 99999 PR PBB SHADOW E&M-EST. PATIENT-LVL III: ICD-10-PCS | Mod: PBBFAC,,, | Performed by: OPTOMETRIST

## 2023-11-20 PROCEDURE — 3044F HG A1C LEVEL LT 7.0%: CPT | Mod: CPTII,S$GLB,, | Performed by: OPTOMETRIST

## 2023-11-20 PROCEDURE — 1159F PR MEDICATION LIST DOCUMENTED IN MEDICAL RECORD: ICD-10-PCS | Mod: CPTII,S$GLB,, | Performed by: OPTOMETRIST

## 2023-11-20 PROCEDURE — 1160F RVW MEDS BY RX/DR IN RCRD: CPT | Mod: CPTII,S$GLB,, | Performed by: OPTOMETRIST

## 2023-11-20 PROCEDURE — 1157F ADVNC CARE PLAN IN RCRD: CPT | Mod: CPTII,S$GLB,, | Performed by: OPTOMETRIST

## 2023-11-20 PROCEDURE — 99212 OFFICE O/P EST SF 10 MIN: CPT | Mod: S$GLB,,, | Performed by: OPTOMETRIST

## 2023-11-20 PROCEDURE — 1159F MED LIST DOCD IN RCRD: CPT | Mod: CPTII,S$GLB,, | Performed by: OPTOMETRIST

## 2023-11-20 PROCEDURE — 1160F PR REVIEW ALL MEDS BY PRESCRIBER/CLIN PHARMACIST DOCUMENTED: ICD-10-PCS | Mod: CPTII,S$GLB,, | Performed by: OPTOMETRIST

## 2023-11-20 PROCEDURE — 1101F PR PT FALLS ASSESS DOC 0-1 FALLS W/OUT INJ PAST YR: ICD-10-PCS | Mod: CPTII,S$GLB,, | Performed by: OPTOMETRIST

## 2023-11-20 PROCEDURE — 3288F PR FALLS RISK ASSESSMENT DOCUMENTED: ICD-10-PCS | Mod: CPTII,S$GLB,, | Performed by: OPTOMETRIST

## 2023-11-20 PROCEDURE — 99999 PR PBB SHADOW E&M-EST. PATIENT-LVL III: CPT | Mod: PBBFAC,,, | Performed by: OPTOMETRIST

## 2023-11-20 PROCEDURE — 1157F PR ADVANCE CARE PLAN OR EQUIV PRESENT IN MEDICAL RECORD: ICD-10-PCS | Mod: CPTII,S$GLB,, | Performed by: OPTOMETRIST

## 2023-11-20 PROCEDURE — 99212 PR OFFICE/OUTPT VISIT, EST, LEVL II, 10-19 MIN: ICD-10-PCS | Mod: S$GLB,,, | Performed by: OPTOMETRIST

## 2023-11-20 RX ORDER — DOXYCYCLINE 100 MG/1
100 CAPSULE ORAL EVERY 12 HOURS
Qty: 10 CAPSULE | Refills: 0 | Status: SHIPPED | OUTPATIENT
Start: 2023-11-20 | End: 2024-01-22

## 2023-11-20 NOTE — PROGRESS NOTES
HPI    DLS: 10/10/2023 - Dr. Ocampo    Pt states that he has been have pain in the outer corner of right eye.   It's been going for the last 2-3 weeks. Pt states that is progressively   becomes worse during the evening. Pt states that pain level is a 3.    Gtts:  Lotemax BID-TID  Warm compresses QHS  Last edited by Loren Vale MA on 11/20/2023  3:00 PM.            Assessment /Plan     For exam results, see Encounter Report.    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  Dispense: 10 capsule; Refill: 0  Start  perfluorohexyloctane, PF, 100 % Drop; Place 1 drop into both eyes 4 (four) times daily.  Dispense: 3 mL; Refill: 11  Start hypochlor spray daily  Continue with warm compresses BID

## 2023-11-28 ENCOUNTER — OFFICE VISIT (OUTPATIENT)
Dept: SPORTS MEDICINE | Facility: CLINIC | Age: 73
End: 2023-11-28
Payer: COMMERCIAL

## 2023-11-28 ENCOUNTER — CLINICAL SUPPORT (OUTPATIENT)
Dept: REHABILITATION | Facility: HOSPITAL | Age: 73
End: 2023-11-28
Attending: INTERNAL MEDICINE
Payer: COMMERCIAL

## 2023-11-28 VITALS
WEIGHT: 183 LBS | HEART RATE: 69 BPM | HEIGHT: 68 IN | SYSTOLIC BLOOD PRESSURE: 110 MMHG | BODY MASS INDEX: 27.74 KG/M2 | DIASTOLIC BLOOD PRESSURE: 68 MMHG

## 2023-11-28 DIAGNOSIS — M75.112 INCOMPLETE TEAR OF LEFT ROTATOR CUFF, UNSPECIFIED WHETHER TRAUMATIC: ICD-10-CM

## 2023-11-28 DIAGNOSIS — G89.29 CHRONIC LEFT SHOULDER PAIN: ICD-10-CM

## 2023-11-28 DIAGNOSIS — S43.432A SUPERIOR GLENOID LABRUM LESION OF LEFT SHOULDER, INITIAL ENCOUNTER: ICD-10-CM

## 2023-11-28 DIAGNOSIS — M94.212 CHONDROMALACIA OF LEFT SHOULDER: ICD-10-CM

## 2023-11-28 DIAGNOSIS — M67.922 BICEPS TENDINOPATHY, LEFT: Primary | ICD-10-CM

## 2023-11-28 DIAGNOSIS — M25.512 CHRONIC LEFT SHOULDER PAIN: ICD-10-CM

## 2023-11-28 DIAGNOSIS — M19.012 PRIMARY OSTEOARTHRITIS, LEFT SHOULDER: ICD-10-CM

## 2023-11-28 DIAGNOSIS — M67.922 BICEPS TENDINOPATHY, LEFT: ICD-10-CM

## 2023-11-28 PROCEDURE — 3288F PR FALLS RISK ASSESSMENT DOCUMENTED: ICD-10-PCS | Mod: CPTII,S$GLB,, | Performed by: ORTHOPAEDIC SURGERY

## 2023-11-28 PROCEDURE — 99999 PR PBB SHADOW E&M-EST. PATIENT-LVL IV: ICD-10-PCS | Mod: PBBFAC,,, | Performed by: ORTHOPAEDIC SURGERY

## 2023-11-28 PROCEDURE — 20550 NJX 1 TENDON SHEATH/LIGAMENT: CPT | Mod: LT,S$GLB,, | Performed by: ORTHOPAEDIC SURGERY

## 2023-11-28 PROCEDURE — 1101F PR PT FALLS ASSESS DOC 0-1 FALLS W/OUT INJ PAST YR: ICD-10-PCS | Mod: CPTII,S$GLB,, | Performed by: ORTHOPAEDIC SURGERY

## 2023-11-28 PROCEDURE — 76942 ECHO GUIDE FOR BIOPSY: CPT | Mod: S$GLB,,, | Performed by: ORTHOPAEDIC SURGERY

## 2023-11-28 PROCEDURE — 1157F ADVNC CARE PLAN IN RCRD: CPT | Mod: CPTII,S$GLB,, | Performed by: ORTHOPAEDIC SURGERY

## 2023-11-28 PROCEDURE — 1125F AMNT PAIN NOTED PAIN PRSNT: CPT | Mod: CPTII,S$GLB,, | Performed by: ORTHOPAEDIC SURGERY

## 2023-11-28 PROCEDURE — 97161 PT EVAL LOW COMPLEX 20 MIN: CPT | Performed by: PHYSICAL THERAPIST

## 2023-11-28 PROCEDURE — 20550 TENDON SHEATH: ICD-10-PCS | Mod: LT,S$GLB,, | Performed by: ORTHOPAEDIC SURGERY

## 2023-11-28 PROCEDURE — 1125F PR PAIN SEVERITY QUANTIFIED, PAIN PRESENT: ICD-10-PCS | Mod: CPTII,S$GLB,, | Performed by: ORTHOPAEDIC SURGERY

## 2023-11-28 PROCEDURE — 3074F PR MOST RECENT SYSTOLIC BLOOD PRESSURE < 130 MM HG: ICD-10-PCS | Mod: CPTII,S$GLB,, | Performed by: ORTHOPAEDIC SURGERY

## 2023-11-28 PROCEDURE — 3074F SYST BP LT 130 MM HG: CPT | Mod: CPTII,S$GLB,, | Performed by: ORTHOPAEDIC SURGERY

## 2023-11-28 PROCEDURE — 99213 OFFICE O/P EST LOW 20 MIN: CPT | Mod: 25,S$GLB,, | Performed by: ORTHOPAEDIC SURGERY

## 2023-11-28 PROCEDURE — 76942 TENDON SHEATH: ICD-10-PCS | Mod: S$GLB,,, | Performed by: ORTHOPAEDIC SURGERY

## 2023-11-28 PROCEDURE — 3044F HG A1C LEVEL LT 7.0%: CPT | Mod: CPTII,S$GLB,, | Performed by: ORTHOPAEDIC SURGERY

## 2023-11-28 PROCEDURE — 3078F DIAST BP <80 MM HG: CPT | Mod: CPTII,S$GLB,, | Performed by: ORTHOPAEDIC SURGERY

## 2023-11-28 PROCEDURE — 99213 PR OFFICE/OUTPT VISIT, EST, LEVL III, 20-29 MIN: ICD-10-PCS | Mod: 25,S$GLB,, | Performed by: ORTHOPAEDIC SURGERY

## 2023-11-28 PROCEDURE — 99999 PR PBB SHADOW E&M-EST. PATIENT-LVL IV: CPT | Mod: PBBFAC,,, | Performed by: ORTHOPAEDIC SURGERY

## 2023-11-28 PROCEDURE — 3008F PR BODY MASS INDEX (BMI) DOCUMENTED: ICD-10-PCS | Mod: CPTII,S$GLB,, | Performed by: ORTHOPAEDIC SURGERY

## 2023-11-28 PROCEDURE — 1101F PT FALLS ASSESS-DOCD LE1/YR: CPT | Mod: CPTII,S$GLB,, | Performed by: ORTHOPAEDIC SURGERY

## 2023-11-28 PROCEDURE — 3044F PR MOST RECENT HEMOGLOBIN A1C LEVEL <7.0%: ICD-10-PCS | Mod: CPTII,S$GLB,, | Performed by: ORTHOPAEDIC SURGERY

## 2023-11-28 PROCEDURE — 1157F PR ADVANCE CARE PLAN OR EQUIV PRESENT IN MEDICAL RECORD: ICD-10-PCS | Mod: CPTII,S$GLB,, | Performed by: ORTHOPAEDIC SURGERY

## 2023-11-28 PROCEDURE — 3288F FALL RISK ASSESSMENT DOCD: CPT | Mod: CPTII,S$GLB,, | Performed by: ORTHOPAEDIC SURGERY

## 2023-11-28 PROCEDURE — 3078F PR MOST RECENT DIASTOLIC BLOOD PRESSURE < 80 MM HG: ICD-10-PCS | Mod: CPTII,S$GLB,, | Performed by: ORTHOPAEDIC SURGERY

## 2023-11-28 PROCEDURE — 3008F BODY MASS INDEX DOCD: CPT | Mod: CPTII,S$GLB,, | Performed by: ORTHOPAEDIC SURGERY

## 2023-11-28 RX ADMIN — TRIAMCINOLONE ACETONIDE 40 MG: 40 INJECTION, SUSPENSION INTRA-ARTICULAR; INTRAMUSCULAR at 09:11

## 2023-11-28 NOTE — PLAN OF CARE
[facility]   Physical Therapy Initial Evaluation      Name: Raffy Rutherford Jr.  Clinic Number: [id number]    Therapy Diagnosis:   Encounter Diagnosis   Name Primary?    Biceps tendinopathy, left         Physician: Davin Castrejon DO    Physician Orders: PT Eval and Treat   Medical Diagnosis from Referral: M67.922 (ICD-10-CM) - Biceps tendinopathy, left   Evaluation Date: 11/28/2023  Authorization Period Expiration: 11/27/2024  Plan of Care Expiration: 3/28/2024  Progress Note Due: 1/1/2024  Visit # / Visits authorized: 1/ 1   FOTO: 1/1    Precautions: Standard     Time In: 1033  Time Out: 1105  Total Appointment Time (timed & untimed codes): 32 minutes    Subjective     Date of onset: months ago  History of current condition - Raffy reports: Patient reports left shoulder pain for the past 6 months. He received an injection to the L shoulder and got 100% relief and notes he usually gets relief for about 10 weeks before the pain returns. He notes going to PT but did not feel like he was receiving much attention. He notes not being very compliant with his HEP in prior times but will be trying harder this go round. He notes prior balance deficits with relief on the alterG and his shoulder pain increases with playing musical instruments. He does not L shoulder pain with sleeping with arm under his pillow.    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    Pain:  Current 4/10, worst 6/10, best 2/10   Location: left shoulder   Description: Aching and Tight  Aggravating Factors: Lifting  Easing Factors: rest    Patients goals: return to playing music pain free     Medical History:   Past Medical History:   Diagnosis Date    Allergy     Arthritis     Back pain     after trauma  beginning in [birth year]    Cataract     Chronic pain     neck and left shoulder    Cluster headache 2013    Colon polyps 2007 2007-2019: TA x5, HP x3    Degenerative disc disease     Depression     Diverticulitis 12/2013    Dry eye syndrome     Fibromyalgia 2013    GERD (gastroesophageal reflux disease)     Hepatitis 1970's    A    History of prostate biopsy 2002    Hyperlipidemia     Joint pain     Prostate cancer 07/2021    PVD (posterior vitreous detachment)     Salzmann's nodular dystrophy of left eye     Sleep apnea     Thyroid nodule 07/16/2014    Trigeminal neuralgia of left side of face     Tubular adenoma of colon 2007    5 removed [id number]    Visual disturbance 2012    problems after cataract surgery       Surgical History:   Raffy Rutherford Jr.  has a past surgical history that includes Knee surgery; Hemorrhoid surgery; Cholecystectomy; Sinus surgery; Eye surgery; Back surgery; Joint replacement; Total hip arthroplasty (4/2012); Cosmetic surgery (2/10/2015); Cosmetic surgery (2/10/2015); Cataract extraction w/  intraocular lens implant (Bilateral); Spinal fusion (06/22/2015); Injection of steroid (Right, 12/6/2018); Injection of steroid (Right, 9/19/2019); Esophagogastroduodenoscopy (N/A, 12/17/2019); Colonoscopy (N/A, 12/17/2019); Cystoscopy with ureteroscopy, retrograde pyelography, and insertion of stent (Left, 8/19/2020); Fusion of lumbar spine by anterior approach (N/A, 8/20/2020); Ulnar nerve transposition (Left, 12/16/2020); Carpal tunnel release (Right, 5/18/2021); Colonoscopy (2007); Repair of extensor tendon (Left, 4/7/2022); and Irrigation and debridement of upper extremity (Left, 4/7/2022).    Medications:   Raffy has a current medication list which includes the following prescription(s): atorvastatin, butalbital-acetaminophen-caffeine -40 mg, celecoxib, clindamycin, clonazepam, doxycycline, ergocalciferol, ajovy syringe, hydrocodone-acetaminophen, lamotrigine, perfluorohexyloctane (pf),  pregabalin, pregabalin, pregabalin, pregabalin, rabeprazole, selegiline, sildenafil, tadalafil, trazodone, ubrogepant, UNABLE TO FIND, and tyrvaya, and the following Facility-Administered Medications: onabotulinumtoxina.    Allergies:   Review of patient's allergies indicates:   Allergen Reactions    Alphagan [brimonidine]      Patient taking MASO-B Selective Inhibitor Selegiline (Emsam)    Coumadin [warfarin]      itch    Oxycodone      hiccups        Objective      Observation: Patient is a 73 y.o. male alert and oriented    Posture: Left scapula anterior tilt and abducted     Passive Range of Motion:   Shoulder Right Left   Flexion 170 170   Abduction 170 170   ER at 0 50 50   ER at 90 90 90   IR 70 70      Active Range of Motion:   Shoulder Right Left   Flexion 170 170   Abduction 170 170   ER at 0 45 45   ER at 90 80 80   IR 60 60   Reach behind head C7 C7   Reach behind back  T12 T12     Strength:  Shoulder Right Left   Flexion 5 4+   Abduction 5 5   ER 4+ 4   IR 4+ 4   Serratus Anterior 4 4-   Middle Trap 4 4-   Low Trap 4 4-     Joint Mobility: Limited posterior glide     Palpation: Tender to biceps tendon but prior injection    Sensation: intact    Flexibility:   Lat: R - ; L -   Pec Minor: R short ; L short   Intake Outcome Measure for FOTO Shoulder Survey    Therapist reviewed FOTO scores for Raffy Rutherford  on 11/28/2023.   FOTO documents entered into [employer] - see Media section.    Intake Score: 55.083%       Treatment       Patient Education and Home Exercises     Education provided:   - improve scap stabilization and thoracic mobility    Written Home Exercises Provided: yes. Exercises were reviewed and Raffy was able to demonstrate them prior to the end of the session.  Raffy demonstrated good understanding of the education provided. See EMR under Patient Instructions for exercises provided during therapy sessions.    Assessment     Raffy is a 73 y.o. male referred to outpatient Physical Therapy  with a medical diagnosis of left biceps tendinopathy. Patient presents with limited shoulder ROM, thoracic mobility deficits, strength deficits, pain with ADL and pain with playing music.    Patient prognosis is Good.   Patient will benefit from skilled outpatient Physical Therapy to address the deficits stated above and in the chart below, provide patient /family education, and to maximize patientt's level of independence.     Plan of care discussed with patient: Yes  Patient's spiritual, cultural and educational needs considered and patient is agreeable to the plan of care and goals as stated below:     Anticipated Barriers for therapy: work schedule    Medical Necessity is demonstrated by the following  History  Co-morbidities and personal factors that may impact the plan of care [x] LOW: no personal factors / co-morbidities  [] MODERATE: 1-2 personal factors / co-morbidities  [] HIGH: 3+ personal factors / co-morbidities    Moderate / High Support Documentation:   Co-morbidities affecting plan of care:     Personal Factors:   age     Examination  Body Structures and Functions, activity limitations and participation restrictions that may impact the plan of care [x] LOW: addressing 1-2 elements  [] MODERATE: 3+ elements  [] HIGH: 4+ elements (please support below)    Moderate / High Support Documentation:      Clinical Presentation [x] LOW: stable  [] MODERATE: Evolving  [] HIGH: Unstable     Decision Making/ Complexity Score: Low       GOALS: Short Term Goals:  4 weeks  1.Report decreased shoulder pain < / =  3/10  to increase tolerance for return to playing music  2. Increase PROM full motion without pain    3. Increased strength by 1/3 MMT grade in cuff to increase tolerance for ADL and work activities.  4. Pt to tolerate HEP to improve ROM and independence with ADL's    Long Term Goals: 8 weeks  1.Report decreased shoulder pain  < / =  1 /10  to increase tolerance for return to sleeping through the  night  2.Increase AROM to full motion without shoulder pain  3.Increase strength to >/= 4/5 in cuff and scap stabilizers to increase tolerance for ADL and work activities.  4. Pt goal: return to playing music without shoulder pain   5. Pt will have improved gcode of CJ (20-40% limited) on FOTO shoulder in order to demonstrate true functional improvement.   Plan     Plan of care Certification: 11/28/2023 to 3/28/2024.    Outpatient Physical Therapy 2 times weekly for 10 weeks to include the following interventions: Gait Training, Manual Therapy, Moist Heat/ Ice, Neuromuscular Re-ed, Paraffin, Patient Education, Self Care, Therapeutic Activities, and Therapeutic Exercise.     Corwin Merritt, PT, DPT, OCS, FAAOMPT

## 2023-11-28 NOTE — PROGRESS NOTES
[facility]   Physical Therapy Initial Evaluation      Name: Raffy Rutherford Jr.  Clinic Number: [id number]    Therapy Diagnosis:   Encounter Diagnosis   Name Primary?    Biceps tendinopathy, left         Physician: Davin Castrejon DO    Physician Orders: PT Eval and Treat   Medical Diagnosis from Referral: M67.922 (ICD-10-CM) - Biceps tendinopathy, left   Evaluation Date: 11/28/2023  Authorization Period Expiration: 11/27/2024  Plan of Care Expiration: 3/28/2024  Progress Note Due: 1/1/2024  Visit # / Visits authorized: 1/ 1   FOTO: 1/1    Precautions: Standard     Time In: 1033  Time Out: 1105  Total Appointment Time (timed & untimed codes): 32 minutes    Subjective     Date of onset: months ago  History of current condition - Raffy reports: Patient reports left shoulder pain for the past 6 months. He received an injection to the L shoulder and got 100% relief and notes he usually gets relief for about 10 weeks before the pain returns. He notes going to PT but did not feel like he was receiving much attention. He notes not being very compliant with his HEP in prior times but will be trying harder this go round. He notes prior balance deficits with relief on the alterG and his shoulder pain increases with playing musical instruments. He does not L shoulder pain with sleeping with arm under his pillow.    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    Pain:  Current 4/10, worst 6/10, best 2/10   Location: left shoulder   Description: Aching and Tight  Aggravating Factors: Lifting  Easing Factors: rest    Patients goals: return to playing music pain free     Medical History:   Past Medical History:   Diagnosis Date    Allergy     Arthritis     Back pain     after trauma  beginning in 89    Cataract     Chronic pain     neck and left shoulder    Cluster headache 2013    Colon polyps 2007 2007-2019: TA x5, HP x3    Degenerative disc disease     Depression     Diverticulitis 12/2013    Dry eye syndrome     Fibromyalgia 2013    GERD (gastroesophageal reflux disease)     Hepatitis 1970's    A    History of prostate biopsy 2002    Hyperlipidemia     Joint pain     Prostate cancer 07/2021    PVD (posterior vitreous detachment)     Salzmann's nodular dystrophy of left eye     Sleep apnea     Thyroid nodule 07/16/2014    Trigeminal neuralgia of left side of face     Tubular adenoma of colon 2007    5 removed [id number]    Visual disturbance 2012    problems after cataract surgery       Surgical History:   Raffy Rutherford Jr.  has a past surgical history that includes Knee surgery; Hemorrhoid surgery; Cholecystectomy; Sinus surgery; Eye surgery; Back surgery; Joint replacement; Total hip arthroplasty (4/2012); Cosmetic surgery (2/10/2015); Cosmetic surgery (2/10/2015); Cataract extraction w/  intraocular lens implant (Bilateral); Spinal fusion (06/22/2015); Injection of steroid (Right, 12/6/2018); Injection of steroid (Right, 9/19/2019); Esophagogastroduodenoscopy (N/A, 12/17/2019); Colonoscopy (N/A, 12/17/2019); Cystoscopy with ureteroscopy, retrograde pyelography, and insertion of stent (Left, 8/19/2020); Fusion of lumbar spine by anterior approach (N/A, 8/20/2020); Ulnar nerve transposition (Left, 12/16/2020); Carpal tunnel release (Right, 5/18/2021); Colonoscopy (2007); Repair of extensor tendon (Left, 4/7/2022); and Irrigation and debridement of upper extremity (Left, 4/7/2022).    Medications:   Raffy has a current medication list which includes the following prescription(s): atorvastatin, butalbital-acetaminophen-caffeine -40 mg, celecoxib, clindamycin, clonazepam, doxycycline, ergocalciferol, ajovy syringe, hydrocodone-acetaminophen, lamotrigine, perfluorohexyloctane (pf),  pregabalin, pregabalin, pregabalin, pregabalin, rabeprazole, selegiline, sildenafil, tadalafil, trazodone, ubrogepant, UNABLE TO FIND, and tyrvaya, and the following Facility-Administered Medications: onabotulinumtoxina.    Allergies:   Review of patient's allergies indicates:   Allergen Reactions    Alphagan [brimonidine]      Patient taking MASO-B Selective Inhibitor Selegiline (Emsam)    Coumadin [warfarin]      itch    Oxycodone      hiccups        Objective      Observation: Patient is a 73 y.o. male alert and oriented    Posture: Left scapula anterior tilt and abducted     Passive Range of Motion:   Shoulder Right Left   Flexion 170 170   Abduction 170 170   ER at 0 50 50   ER at 90 90 90   IR 70 70      Active Range of Motion:   Shoulder Right Left   Flexion 170 170   Abduction 170 170   ER at 0 45 45   ER at 90 80 80   IR 60 60   Reach behind head C7 C7   Reach behind back  T12 T12     Strength:  Shoulder Right Left   Flexion 5 4+   Abduction 5 5   ER 4+ 4   IR 4+ 4   Serratus Anterior 4 4-   Middle Trap 4 4-   Low Trap 4 4-     Joint Mobility: Limited posterior glide     Palpation: Tender to biceps tendon but prior injection    Sensation: intact    Flexibility:   Lat: R - ; L -   Pec Minor: R short ; L short   Intake Outcome Measure for FOTO Shoulder Survey    Therapist reviewed FOTO scores for Raffy Rutherford  on 11/28/2023.   FOTO documents entered into [employer] - see Media section.    Intake Score: 55.083%       Treatment       Patient Education and Home Exercises     Education provided:   - improve scap stabilization and thoracic mobility    Written Home Exercises Provided: yes. Exercises were reviewed and Raffy was able to demonstrate them prior to the end of the session.  Raffy demonstrated good understanding of the education provided. See EMR under Patient Instructions for exercises provided during therapy sessions.    Assessment     Raffy is a 73 y.o. male referred to outpatient Physical Therapy  with a medical diagnosis of left biceps tendinopathy. Patient presents with limited shoulder ROM, thoracic mobility deficits, strength deficits, pain with ADL and pain with playing music.    Patient prognosis is Good.   Patient will benefit from skilled outpatient Physical Therapy to address the deficits stated above and in the chart below, provide patient /family education, and to maximize patientt's level of independence.     Plan of care discussed with patient: Yes  Patient's spiritual, cultural and educational needs considered and patient is agreeable to the plan of care and goals as stated below:     Anticipated Barriers for therapy: work schedule    Medical Necessity is demonstrated by the following  History  Co-morbidities and personal factors that may impact the plan of care [x] LOW: no personal factors / co-morbidities  [] MODERATE: 1-2 personal factors / co-morbidities  [] HIGH: 3+ personal factors / co-morbidities    Moderate / High Support Documentation:   Co-morbidities affecting plan of care:     Personal Factors:   age     Examination  Body Structures and Functions, activity limitations and participation restrictions that may impact the plan of care [x] LOW: addressing 1-2 elements  [] MODERATE: 3+ elements  [] HIGH: 4+ elements (please support below)    Moderate / High Support Documentation:      Clinical Presentation [x] LOW: stable  [] MODERATE: Evolving  [] HIGH: Unstable     Decision Making/ Complexity Score: Low       GOALS: Short Term Goals:  4 weeks  1.Report decreased shoulder pain < / =  3/10  to increase tolerance for return to playing music  2. Increase PROM full motion without pain    3. Increased strength by 1/3 MMT grade in cuff to increase tolerance for ADL and work activities.  4. Pt to tolerate HEP to improve ROM and independence with ADL's    Long Term Goals: 8 weeks  1.Report decreased shoulder pain  < / =  1 /10  to increase tolerance for return to sleeping through the  night  2.Increase AROM to full motion without shoulder pain  3.Increase strength to >/= 4/5 in cuff and scap stabilizers to increase tolerance for ADL and work activities.  4. Pt goal: return to playing music without shoulder pain   5. Pt will have improved gcode of CJ (20-40% limited) on FOTO shoulder in order to demonstrate true functional improvement.   Plan     Plan of care Certification: 11/28/2023 to 3/28/2024.    Outpatient Physical Therapy 2 times weekly for 10 weeks to include the following interventions: Gait Training, Manual Therapy, Moist Heat/ Ice, Neuromuscular Re-ed, Paraffin, Patient Education, Self Care, Therapeutic Activities, and Therapeutic Exercise.     Corwin Merritt, PT, DPT, OCS, FAAOMPT     Bcc Infiltrative Histology Text: There were numerous aggregates of basaloid cells demonstrating an infiltrative pattern.

## 2023-11-28 NOTE — PROGRESS NOTES
CC: Left shoulder pain    Raffy Rutherford Jr., a 73 y.o. male, presents today for follow up evaluation of his left shoulder.  Diagnosis of symptomatic SLAP tear, partial-thickness rotator cuff tear, biceps tendinitis.  Conservative care to this point.  At last appointment, 8/29/2023, patient underwent  long head of biceps tendon sheath CSI under ultrasound guidance which provided excellent near complete relief for a few months.  He would like to repeat that injection.  We have also discussed surgery in the past.  The patient would like to hold off on that.  He also would like to switch physical therapy back to [facility] from [facility].  For logistical reasons.    Prior Hx 8/29/2023:   Raffy returns today for follow up of left shoulder pain. Received CSI at previous appointment, reports 100 % relief for 5-6 weeks.  Overall much better after this injection but does have now some recurrent pain and somewhat different location.  Localizes anteriorly over the proximal biceps groove.  This does bother him with activity.  He would like to discuss treatment options for this.  Going to PT with Ronald Zurita for his back and hip and OT with Vickie Perkins.  Under the care of my colleague Dr. Kaye Solis for his right hand.    Prior hx 7/11/23:  Raffy returns to to discuss a possible left shoulder repeat steroid injection.  I saw him for the 1st time virtually recently.  MRI results reviewed.  He has low-grade undersurface cuff tearing with some degenerative changes within the glenohumeral joint and SLAP tearing/biceps tendinopathy.  Complaining of chronic left shoulder pain. He has been seen by multiple mid-level providers over in the General Orthopedic Department.  He was given a subacromial steroid injection in been March which provided approximately 70% relief in pain for a few weeks.  Additionally he has had physical therapy at the [facility] location with Ronald Zurita.  Has taken Celebrex and Mobic a different times.  Chief  complaint of ongoing left shoulder pain worse with overhead activity and after playing cello.  Today he localizes over his medial upper arm, occasionally superolateral with provocative movements.  He describes intermittent clicking and catching in the shoulder which is deep.  This is bothersome.  Denies any antecedent injury mechanism.      PMHx notable for cervical and lumbar spine DJD, bilateral foot drop  PSHx notable for left SHARAD 2012, L3-5 fusion 2015, L5-S1 fusion 2020  Negative for tobacco.   Negative for diabetes.    PAST MEDICAL HISTORY:   Past Medical History:   Diagnosis Date    Allergy     Arthritis     Back pain     after trauma beginning in 89    Cataract     Chronic pain     neck and left shoulder    Cluster headache 2013    Colon polyps 2007 2007-2019: TA x5, HP x3    Degenerative disc disease     Depression     Diverticulitis 12/2013    Dry eye syndrome     Fibromyalgia 2013    GERD (gastroesophageal reflux disease)     Hepatitis 1970's    A    History of prostate biopsy 2002    Hyperlipidemia     Joint pain     Prostate cancer 07/2021    PVD (posterior vitreous detachment)     Salzmann's nodular dystrophy of left eye     Sleep apnea     Thyroid nodule 07/16/2014    Trigeminal neuralgia of left side of face     Tubular adenoma of colon 2007    5 removed [id number]    Visual disturbance 2012    problems after cataract surgery     PAST SURGICAL HISTORY:  Past Surgical History:   Procedure Laterality Date    BACK SURGERY      CARPAL TUNNEL RELEASE Right 5/18/2021    Procedure: RELEASE, CARPAL TUNNEL;  Surgeon: Kaye Solis MD;  Location: [facility];  Service: Orthopedics;  Laterality: Right;    CATARACT EXTRACTION W/  INTRAOCULAR LENS IMPLANT Bilateral     CHOLECYSTECTOMY      COLONOSCOPY N/A 12/17/2019    Normal - Repeat 5yrs    COLONOSCOPY  2007 2007 TA x2, 2011 TA x3, 2014 HP x3, 2019 normal    COSMETIC SURGERY  2/10/2015    Direct mid-forehead brow lift    COSMETIC SURGERY  2/10/2015     Bilateral upper lid blepahroplasty    CYSTOSCOPY WITH URETEROSCOPY, RETROGRADE PYELOGRAPHY, AND INSERTION OF STENT Left 8/19/2020    Procedure: CYSTOSCOPY, WITH RETROGRADE PYELOGRAM AND URETERAL STENT INSERTION;  Surgeon: Katelynn George MD;  Location: [facility];  Service: Urology;  Laterality: Left;    ESOPHAGOGASTRODUODENOSCOPY N/A 12/17/2019    Procedure: ESOPHAGOGASTRODUODENOSCOPY (EGD);  Surgeon: Amadou Hardin MD;  Location: [facility] ([address] [place] [address]);  Service: Endoscopy;  Laterality: N/A;    EYE SURGERY      FUSION OF LUMBAR SPINE BY ANTERIOR APPROACH N/A 8/20/2020    Procedure: FUSION, SPINE, LUMBAR, ANTERIOR APPROACH L5-S1 ALIF Stand Alone;  Surgeon: Jason Caldwell MD;  Location: [facility];  Service: Neurosurgery;  Laterality: N/A;    HEMORRHOID SURGERY      with complication of chronic bleeding for 6 weeks     INJECTION OF STEROID Right 12/6/2018    Procedure: INJECTION, STEROID Right SI Joint Block and Steroid Injection;  Surgeon: Jason Caldwell MD;  Location: [facility];  Service: Neurosurgery;  Laterality: Right;  Procedure: Right SI Joint Block and Steroid Injection  Surgery Time: 30 MIN  LOS: 0  Anesthesia: MAC  Radiology: C-arm  Bed: [place] 4 Poster  Position: Prone    INJECTION OF STEROID Right 9/19/2019    Procedure: INJECTION, STEROID Procedure: Right SI joint block nd steroid injection;  Surgeon: Jason Caldwell MD;  Location: [facility];  Service: Neurosurgery;  Laterality: Right;  Procedure: Right SI joint block nd steroid injection  Surgery Time: 30 mins  LOS:   Anesthesia: General MAC  Radiology:C-arm  Bed: Regular Bed  Position: Prone    IRRIGATION AND DEBRIDEMENT OF UPPER EXTREMITY Left 4/7/2022    Procedure: IRRIGATION AND DEBRIDEMENT, UPPER EXTREMITY;  Surgeon: Kaye Solis MD;  Location: [facility];  Service: Orthopedics;  Laterality: Left;    JOINT REPLACEMENT      KNEE SURGERY      involving arthroscopic surgery to both knees    REPAIR OF EXTENSOR TENDON Left 4/7/2022    Procedure: REPAIR,  TENDON, EXTENSOR thumb, EPB and EPL;  Surgeon: Kaye Solis MD;  Location: [facility];  Service: Orthopedics;  Laterality: Left;    SINUS SURGERY      left molar and sinus surgery for trigeminal neuralgia    SPINAL FUSION  06/22/2015    L3-L5 XLIF/TANA    TOTAL HIP ARTHROPLASTY  4/2012    Pt states he had total hip replacement on his left hip.    ULNAR NERVE TRANSPOSITION Left 12/16/2020    Procedure: TRANSPOSITION, NERVE, ULNAR - left carpal and cubital tunnel releases;  Surgeon: Addi Bauer MD;  Location: [facility] OR;  Service: Orthopedics;  Laterality: Left;     FAMILY HISTORY:  Family History   Problem Relation Age of Onset    Stroke Mother 86    Colon cancer Mother         early/mid-60s    Uterine cancer Mother 77    Pancreatitis Brother         acute, now s/p nathalia    Diabetes Brother     Macular degeneration Brother     Other Brother         foot drop    Other Father 44        pituitary tumor- CA vs benign?    Heart attack Maternal Grandfather         suspected, 50s    Migraines Sister     Crohn's disease Sister         w/ assoc joint issues    Arthritis Sister     Tremor Daughter     Irritable bowel syndrome Son         leaning toward Crohn's dx    Neurological Disorders Son         nonspecific, benign fasciculations of calves    Tremor Son     Jaundice Grandchild     Other Maternal Uncle         memory issue    Other Maternal Uncle         memory issue    Cancer Maternal Cousin         origin? maybe thyroid? 40s?    Melanoma Neg Hx     Amblyopia Neg Hx     Blindness Neg Hx     Cataracts Neg Hx     Glaucoma Neg Hx     Hypertension Neg Hx     Retinal detachment Neg Hx     Strabismus Neg Hx     Thyroid disease Neg Hx     Allergic rhinitis Neg Hx     Allergies Neg Hx     Angioedema Neg Hx     Asthma Neg Hx     Eczema Neg Hx     Urticaria Neg Hx     Rhinitis Neg Hx     Immunodeficiency Neg Hx     Atopy Neg Hx      MEDICATIONS:    Current Outpatient Medications:     atorvastatin (LIPITOR) 40 MG tablet, Take 1  tablet (40 mg total) by mouth once daily., Disp: 90 tablet, Rfl: 1    butalbital-acetaminophen-caffeine -40 mg (FIORICET, ESGIC) -40 mg per tablet, Take 1 tablet by mouth daily as needed for 30 days., Disp: 30 tablet, Rfl: 0    celecoxib (CELEBREX) 100 MG capsule, Take 2 capsules (200 mg total) by mouth 2 (two) times daily., Disp: 180 capsule, Rfl: 3    clindamycin (CLEOCIN) 150 MG capsule, Take 4 capsules (600 mg total) by mouth 1 hour prior to dental appointment., Disp: 12 capsule, Rfl: 1    clonazePAM (KLONOPIN) 0.5 MG tablet, Take 1 tablet by mouth twice a day as needed for anxiety, Disp: 60 tablet, Rfl: 5    doxycycline (VIBRAMYCIN) 100 MG Cap, Take 1 capsule (100 mg total) by mouth every 12 (twelve) hours., Disp: 10 capsule, Rfl: 0    ergocalciferol (VITAMIN D2) 50,000 unit Cap, Take 1 capsule (50,000 Units total) by mouth every 7 days., Disp: 12 capsule, Rfl: 3    fremanezumab-vfrm (AJOVY SYRINGE) 225 mg/1.5 mL injection, Inject 1.5 mLs (225 mg total) into the skin every 28 days., Disp: 1.5 mL, Rfl: 5    HYDROcodone-acetaminophen (NORCO) 5-325 mg per tablet, Take 1 tablet by mouth every 8 (eight) hours as needed for Pain., Disp: 90 tablet, Rfl: 0    lamoTRIgine (LAMICTAL) 200 MG tablet, Take 1 tablet (200 mg total) by mouth once daily., Disp: 90 tablet, Rfl: 3    pregabalin (LYRICA) 225 MG Cap, Take 1 capsule (225 mg total) by mouth every evening., Disp: 93 capsule, Rfl: 3    pregabalin (LYRICA) 25 MG capsule, Take 2 capsules (50 mg total) by mouth once daily., Disp: 90 capsule, Rfl: 1    pregabalin (LYRICA) 25 MG capsule, Take 1 capsule (25 mg total) by mouth 2 (two) times a day., Disp: 180 capsule, Rfl: 0    pregabalin (LYRICA) 75 MG capsule, Take 3 capsules by mouth at night, Disp: 90 capsule, Rfl: 1    RABEprazole (ACIPHEX) 20 mg tablet, Take 1 tablet (20 mg total) by mouth 2 (two) times daily., Disp: 180 tablet, Rfl: 3    selegiline (EMSAM) 9 mg/24 hr, Place 1 patch onto the skin once daily.,  Disp: 30 patch, Rfl: 11    sildenafiL (VIAGRA) 100 MG tablet, Take 1 tablet (100 mg total) by mouth as needed for Erectile Dysfunction (take 30-60 minutes before on empty stomach). Take one hour before., Disp: 4 tablet, Rfl: 12    tadalafiL (CIALIS) 10 MG tablet, Take 1 tablet (10 mg total) by mouth daily as needed for Erectile Dysfunction., Disp: 30 tablet, Rfl: 11    traZODone (DESYREL) 50 MG tablet, Take 1 tablet (50 mg total) by mouth every evening., Disp: 90 tablet, Rfl: 1    ubrogepant (UBRELVY) 100 mg tablet, Take 1 tablet (100 mg total) by mouth every 2 (two) hours as needed for Migraine. Max 200 mg/day., Disp: 16 tablet, Rfl: 5    UNABLE TO FIND, medication name: lidocaine 4% NS, Disp: , Rfl:     varenicline (TYRVAYA) 0.03 mg/spray sprm, 1 spray by Each Nostril route 2 (two) times a day., Disp: 8.4 mL, Rfl: 11    HYDROcodone-acetaminophen (NORCO) 5-325 mg per tablet, Take 1 tablet by mouth four times daily as needed for 30 days, Disp: 120 tablet, Rfl: 0    perfluorohexyloctane, PF, 100 % Drop, Place 1 drop into both eyes 4 (four) times daily., Disp: 3 mL, Rfl: 11    pregabalin (LYRICA) 25 MG capsule, Take 1 capsule by mouth twice daily, Disp: 180 capsule, Rfl: 0    pregabalin (LYRICA) 75 MG capsule, Take 3 capsules by mouth at night, Disp: 270 capsule, Rfl: 0    Current Facility-Administered Medications:     onabotulinumtoxina injection 200 Units, 200 Units, Intramuscular, q12 weeks, Macie Pearson FNP, 200 Units at 11/09/23 1109    ALLERGIES:  Review of patient's allergies indicates:   Allergen Reactions    Alphagan [brimonidine]      Patient taking MASO-B Selective Inhibitor Selegiline (Emsam)    Coumadin [warfarin]      itch    Oxycodone      hiccups     REVIEW OF SYSTEMS:  Constitution: Negative. Negative for chills, fever and night sweats.    Hematologic/Lymphatic: Negative for bleeding problem. Does not bruise/bleed easily.   Skin: Negative for dry skin, itching and rash.   Musculoskeletal:  "Negative for falls. Positive for left shoulder pain and muscle weakness.     All other review of symptoms were reviewed and found to be noncontributory.    PHYSICAL EXAMINATION:  Vitals:  /68   Pulse 69   Ht 5' 8" (1.727 m)   Wt 83 kg (182 lb 15.7 oz)   BMI 27.82 kg/m²    General: Well-developed well-nourished 73 y.o. malein no acute distress   Cardiovascular: Regular rhythm by palpation of distal pulse, normal color and temperature, no concerning varicosities on symptomatic side   Lungs: No labored breathing or wheezing appreciated   Neuro: Alert and oriented ×3   Psychiatric: well oriented to person, place and time, demonstrates normal mood and affect   Skin: No rashes, lesions or ulcers, normal temperature, turgor, and texture on uninvolved extremity    Ortho/SPM Exam  Examination of the left shoulder again demonstrates active forward elevation to 160, ER with arm at side to 60, IR to T10.  Intact overhead passive range of motion.  Prominent pain over the proximal biceps groove.  Positive modified speed's test.  Minimal tenderness to palpation over the posterior glenohumeral joint line.  4+ out of 5 resisted scaption.  Mildly positive glenohumeral grind test.    IMAGING:  Prior Xrays including AP, Outlet and Axillary Lateral of Left shoulder are ordered / images reviewed by me:   Mild DJD.    MRI left shoulder:  1. Supraspinatus tendinosis with articular surface fraying and a small, low-grade partial-thickness interstitial/concealed footprint tear.  2. Infraspinatus and subscapularis tendinosis.  3. SLAP tear that involves the biceps anchor.  4. Biceps tendinosis with partial-thickness interstitial tearing.  5. Mild glenohumeral osteoarthritis.  6. Subacromial/subdeltoid bursitis.  7. AC joint arthrosis.    ASSESSMENT:      ICD-10-CM ICD-9-CM   1. Biceps tendinopathy, left  M67.922 727.9   2. Superior glenoid labrum lesion of left shoulder, initial encounter  S43.432A 840.7   3. Chondromalacia of left " shoulder  M94.212 733.92   4. Incomplete tear of left rotator cuff, unspecified whether traumatic  M75.112 840.4   5. Primary osteoarthritis, left shoulder  M19.012 715.11   6. Chronic left shoulder pain  M25.512 719.41    G89.29 338.29       PLAN:     Repeat ultrasound-guided corticosteroid injection into the left biceps sheath was given.  As before we have discussed considerations for surgery.  This would be an arthroscopic extensive debridement with biceps tenodesis. RTC PRN.  New physical therapy referral placed.    Tendon Sheath    Date/Time: 11/28/2023 9:30 AM    Performed by: GEORGIANA Up MD  Authorized by: GEORGIANA Up MD    Consent Done?:  Yes (Verbal)  Indications:  Pain  Site marked: the procedure site was marked    Timeout: prior to procedure the correct patient, procedure, and site was verified    Prep: patient was prepped and draped in usual sterile fashion      Local anesthesia used?: Yes    Local anesthetic: 0.2% Naropin.  Anesthetic total (ml):  4    Location:  Shoulder  Site:  L bicep tendon  Ultrasonic guidance for needle placement?: Yes    Needle size:  22 G  Medications:  40 mg triamcinolone acetonide 40 mg/mL  Patient tolerance:  Patient tolerated the procedure well with no immediate complications    Additional Comments: The ultrasound was used to gain visualization over the proximal biceps groove.  A representative picture was taken and saved showing the biceps groove and anatomy along with the needle introduced into the biceps sheath for the steroid injection.

## 2023-12-04 ENCOUNTER — CLINICAL SUPPORT (OUTPATIENT)
Dept: REHABILITATION | Facility: HOSPITAL | Age: 73
End: 2023-12-04
Payer: COMMERCIAL

## 2023-12-04 DIAGNOSIS — M25.512 CHRONIC LEFT SHOULDER PAIN: Primary | ICD-10-CM

## 2023-12-04 DIAGNOSIS — G89.29 CHRONIC LEFT SHOULDER PAIN: Primary | ICD-10-CM

## 2023-12-04 PROCEDURE — 97140 MANUAL THERAPY 1/> REGIONS: CPT | Performed by: PHYSICAL THERAPIST

## 2023-12-04 PROCEDURE — 97530 THERAPEUTIC ACTIVITIES: CPT | Performed by: PHYSICAL THERAPIST

## 2023-12-04 PROCEDURE — 97112 NEUROMUSCULAR REEDUCATION: CPT | Performed by: PHYSICAL THERAPIST

## 2023-12-04 RX ORDER — TRIAMCINOLONE ACETONIDE 40 MG/ML
40 INJECTION, SUSPENSION INTRA-ARTICULAR; INTRAMUSCULAR
Status: DISCONTINUED | OUTPATIENT
Start: 2023-11-28 | End: 2023-12-04 | Stop reason: HOSPADM

## 2023-12-04 NOTE — PROGRESS NOTES
"[facility]   Physical Therapy Treatment Note      Name: Raffy Rutherford Jr.  Clinic Number: [id number]    Therapy Diagnosis:   Encounter Diagnosis   Name Primary?    Chronic left shoulder pain Yes     Physician: Lisandra Trinh NP    Visit Date: 12/4/2023    Physician Orders: PT Eval and Treat   Medical Diagnosis from Referral: M67.922 (ICD-10-CM) - Biceps tendinopathy, left   Evaluation Date: 11/28/2023  Authorization Period Expiration: 11/27/2024  Plan of Care Expiration: 3/28/2024  Progress Note Due: 1/1/2024  Visit # / Visits authorized: 1/ 1   FOTO: 1/1    PTA Visit #: 0/5     Time In: 1230  Time Out: 1328  Total Billable Time: 58 minutes    Subjective     Pt reports: Got a few questions on the exercises. Shoulder exercises I did 3x this past week, rested after the injection per MD recommendation  He was compliant with home exercise program.  Response to previous treatment: no change  Functional change: no changes    Pain: 5/10  Location: left shoulder      Objective      Objective Measures updated at progress report unless specified.     Treatment     Raffy received the treatments listed below:      therapeutic exercises to develop strength, endurance, ROM, flexibility, posture, and core stabilization for 0 minutes including:      manual therapy techniques: Joint mobilizations and Soft tissue Mobilization were applied to the: L shoulder for 10 minutes, including:  Gr I-II oscillations  Gr III flexion   Gr III IR/ER with centering glide    neuromuscular re-education activities to improve: Balance, Coordination, Sense, Proprioception, and Posture for 23 minutes. The following activities were included:    Rhythmic stab flex/ext, IR/ER  IR OTB 3 x 12  GTB walkouts 3 x 8  Ball on wall serratus press 30" 3x  Shoulder extensions GTB 2 x 10  Supine controlled arc of motion 2# 2 x 10    therapeutic activities to improve functional performance for 25  minutes, including:    Prone extensions " "2# 3 x 12  Prone row 3# 3 x 12  SLR supine 2 x 10  Sidelying clam GTB to improve gait and balance 2 x 10 5" hold      Patient Education and Home Exercises       Education provided:   - proper scap stabilization, LE strengthening for weakness    Written Home Exercises Provided: YES. Exercises were reviewed and Raffy was able to demonstrate them prior to the end of the session.  Raffy demonstrated good understanding of the education provided. See EMR under Patient Instructions for exercises provided during therapy sessions    Assessment     Raffy did well with first treatment session to strengthen cuff and scap stabilization. He reports fatigue with exercise but no increase in pain. Notes the L leg is weaker with SLR. Most sets were limited to 8 reps due to fatigue and unable to complete 12 reps.     Raffy Is progressing well towards his goals.   Pt prognosis is Excellent.     Pt will continue to benefit from skilled outpatient physical therapy to address the deficits listed in the problem list box on initial evaluation, provide pt/family education and to maximize pt's level of independence in the home and community environment.     Pt's spiritual, cultural and educational needs considered and pt agreeable to plan of care and goals.     Anticipated barriers to physical therapy: none    GOALS: Short Term Goals:  4 weeks  1.Report decreased shoulder pain < / =  3/10  to increase tolerance for return to playing music(Progressing, not met)  2. Increase PROM full motion without pain  (Progressing, not met)  3. Increased strength by 1/3 MMT grade in cuff to increase tolerance for ADL and work activities.(Progressing, not met)  4. Pt to tolerate HEP to improve ROM and independence with ADL's(Progressing, not met)     Long Term Goals: 8 weeks  1.Report decreased shoulder pain  < / =  1 /10  to increase tolerance for return to sleeping through the night(Progressing, not met)  2.Increase AROM to full motion without shoulder " pain(Progressing, not met)  3.Increase strength to >/= 4/5 in cuff and scap stabilizers to increase tolerance for ADL and work activities.(Progressing, not met)  4. Pt goal: return to playing music without shoulder pain (Progressing, not met)  5. Pt will have improved gcode of CJ (20-40% limited) on FOTO shoulder in order to demonstrate true functional improvement. (Progressing, not met)    Plan     Plan of care Certification: 11/28/2023 to 3/28/2024.     Improve L scap stabilization     Corwin Merritt, PT, DPT, OCS, FAAOMPT

## 2023-12-06 DIAGNOSIS — K64.9 HEMORRHOIDS, UNSPECIFIED HEMORRHOID TYPE: Primary | ICD-10-CM

## 2023-12-06 RX ORDER — HYDROCORTISONE ACETATE PRAMOXINE HCL 2.5; 1 G/100G; G/100G
CREAM TOPICAL 3 TIMES DAILY
Qty: 30 G | Refills: 2 | Status: SHIPPED | OUTPATIENT
Start: 2023-12-06

## 2023-12-06 RX ORDER — HYDROCORTISONE ACETATE 25 MG/1
25 SUPPOSITORY RECTAL 2 TIMES DAILY
Qty: 24 SUPPOSITORY | Refills: 1 | Status: SHIPPED | OUTPATIENT
Start: 2023-12-06 | End: 2024-01-09

## 2023-12-07 ENCOUNTER — CLINICAL SUPPORT (OUTPATIENT)
Dept: REHABILITATION | Facility: HOSPITAL | Age: 73
End: 2023-12-07
Payer: COMMERCIAL

## 2023-12-07 DIAGNOSIS — G89.29 CHRONIC LEFT SHOULDER PAIN: Primary | ICD-10-CM

## 2023-12-07 DIAGNOSIS — M25.512 CHRONIC LEFT SHOULDER PAIN: Primary | ICD-10-CM

## 2023-12-07 PROCEDURE — 97112 NEUROMUSCULAR REEDUCATION: CPT | Performed by: PHYSICAL THERAPIST

## 2023-12-07 PROCEDURE — 97530 THERAPEUTIC ACTIVITIES: CPT | Performed by: PHYSICAL THERAPIST

## 2023-12-08 NOTE — PROGRESS NOTES
[facility]   Physical Therapy Treatment Note      Name: Raffy Rutherford Jr.  Clinic Number: [id number]    Therapy Diagnosis:   Encounter Diagnosis   Name Primary?    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  Response to previous treatment: no change  Functional change: no changes    Pain: 5/10  Location: left shoulder      Objective      Objective Measures updated at progress report unless specified.     Treatment     Raffy received the treatments listed below:      therapeutic exercises to develop strength, endurance, ROM, flexibility, posture, and core stabilization for 0 minutes including:      manual therapy techniques: Joint mobilizations and Soft tissue Mobilization were applied to the: L shoulder for 20 minutes, including:  Gr I-II oscillations  Gr III flexion   Gr III IR/ER with centering glide  Dry needling to biceps brachii 4 single stick needles     neuromuscular re-education activities to improve: Balance, Coordination, Sense, Proprioception, and Posture for 22 minutes. The following activities were included:    Sidelying ER to neutral 2 x 10  IR/ER walkouts OTB 2 x 10  Shoulder extensions GTB 2 x 10      therapeutic activities to improve functional performance for 18  minutes, including:    Slot machines 4# 3 x 15  Row with ER OTB 2 x 10    NT  Prone extensions 2# 3 x  "12  Prone row 3# 3 x 12  SLR supine 2 x 10  Sidelying clam GTB to improve gait and balance 2 x 10 5" hold      Patient Education and Home Exercises       Education provided:   - proper scap stabilization, LE strengthening for weakness    Written Home Exercises Provided: YES. Exercises were reviewed and Rafyf was able to demonstrate them prior to the end of the session.  Raffy demonstrated good understanding of the education provided. See EMR under Patient Instructions for exercises provided during therapy sessions    Assessment     Reduced load to the cuff today following increased pain last session. DN to the bicep with muscle twitch noted.Progress row with ER and cue for scap squeeze. Cuff walkouts for stabilization bilaterally    Raffy Is progressing well towards his goals.   Pt prognosis is Excellent.     Pt will continue to benefit from skilled outpatient physical therapy to address the deficits listed in the problem list box on initial evaluation, provide pt/family education and to maximize pt's level of independence in the home and community environment.     Pt's spiritual, cultural and educational needs considered and pt agreeable to plan of care and goals.     Anticipated barriers to physical therapy: none    GOALS: Short Term Goals:  4 weeks  1.Report decreased shoulder pain < / =  3/10  to increase tolerance for return to playing music(Progressing, not met)  2. Increase PROM full motion without pain  (Progressing, not met)  3. Increased strength by 1/3 MMT grade in cuff to increase tolerance for ADL and work activities.(Progressing, not met)  4. Pt to tolerate HEP to improve ROM and independence with ADL's(Progressing, not met)     Long Term Goals: 8 weeks  1.Report decreased shoulder pain  < / =  1 /10  to increase tolerance for return to sleeping through the night(Progressing, not met)  2.Increase AROM to full motion without shoulder pain(Progressing, not met)  3.Increase strength to >/= 4/5 in cuff " and scap stabilizers to increase tolerance for ADL and work activities.(Progressing, not met)  4. Pt goal: return to playing music without shoulder pain (Progressing, not met)  5. Pt will have improved gcode of CJ (20-40% limited) on FOTO shoulder in order to demonstrate true functional improvement. (Progressing, not met)    Plan     Plan of care Certification: 11/28/2023 to 3/28/2024.     Improve L scap stabilization     Corwin Merritt, PT, DPT, OCS, FAAOMPT

## 2023-12-11 ENCOUNTER — PATIENT MESSAGE (OUTPATIENT)
Dept: REHABILITATION | Facility: HOSPITAL | Age: 73
End: 2023-12-11

## 2023-12-14 ENCOUNTER — CLINICAL SUPPORT (OUTPATIENT)
Dept: REHABILITATION | Facility: HOSPITAL | Age: 73
End: 2023-12-14
Payer: COMMERCIAL

## 2023-12-14 ENCOUNTER — PATIENT MESSAGE (OUTPATIENT)
Dept: OPTOMETRY | Facility: CLINIC | Age: 73
End: 2023-12-14
Payer: COMMERCIAL

## 2023-12-14 DIAGNOSIS — M25.512 CHRONIC LEFT SHOULDER PAIN: Primary | ICD-10-CM

## 2023-12-14 DIAGNOSIS — G89.29 CHRONIC LEFT SHOULDER PAIN: Primary | ICD-10-CM

## 2023-12-14 PROCEDURE — 97140 MANUAL THERAPY 1/> REGIONS: CPT | Performed by: PHYSICAL THERAPIST

## 2023-12-14 PROCEDURE — 97530 THERAPEUTIC ACTIVITIES: CPT | Performed by: PHYSICAL THERAPIST

## 2023-12-14 PROCEDURE — 97112 NEUROMUSCULAR REEDUCATION: CPT | Performed by: PHYSICAL THERAPIST

## 2023-12-14 NOTE — PROGRESS NOTES
[facility]   Physical Therapy Treatment Note      Name: Raffy Rutherford Jr.  Clinic Number: [id number]    Therapy Diagnosis:   Encounter Diagnosis   Name Primary?    Chronic left shoulder pain Yes     Physician: Lisandra Trinh NP    Visit Date: 12/14/2023    Physician Orders: PT Eval and Treat   Medical Diagnosis from Referral: M67.922 (ICD-10-CM) - Biceps tendinopathy, left   Evaluation Date: 11/28/2023  Authorization Period Expiration: 11/27/2024  Plan of Care Expiration: 3/28/2024  Progress Note Due: 1/1/2024  Visit # / Visits authorized: 3/20  FOTO: 1/1    PTA Visit #: 0/5     Time In: 1500  Time Out: 1600  Total Billable Time: 60 minutes    Subjective     Pt reports: Some soreness/pain with IR exercise. Played over the weekend but it hurts 1 hour after    He was compliant with home exercise program.  Response to previous treatment: no change  Functional change: no changes    Pain: 5/10  Location: left shoulder      Objective      Objective Measures updated at progress report unless specified.     Treatment     Raffy received the treatments listed below:      therapeutic exercises to develop strength, endurance, ROM, flexibility, posture, and core stabilization for 0 minutes including:      manual therapy techniques: Joint mobilizations and Soft tissue Mobilization were applied to the: L shoulder for 10 minutes, including:  Gr I-II oscillations  Gr III flexion   Gr III IR/ER with centering glide  Dry needling to biceps brachii 4 single stick needles -NT    neuromuscular re-education activities to improve: Balance, Coordination, Sense, Proprioception, and Posture for 27 minutes. The following activities were included:    Sidelying ER 2# to neutral 2 x 10  Serratus press 3# 2 x 10  IR walkouts OTB 2 x 10  Shoulder extensions GTB 2 x 10      therapeutic activities to improve functional performance for 23  minutes, including:    Slot machines 3# 3 x 15  Row with ER OTB 2 x 10  SLR  "supine 2 x 10  Bridges 2 x 15    NT  Prone extensions 2# 3 x 12  Prone row 3# 3 x 12    Sidelying clam GTB to improve gait and balance 2 x 10 5" hold      Patient Education and Home Exercises       Education provided:   - proper scap stabilization, LE strengthening for weakness    Written Home Exercises Provided: YES. Exercises were reviewed and Raffy was able to demonstrate them prior to the end of the session.  Raffy demonstrated good understanding of the education provided. See EMR under Patient Instructions for exercises provided during therapy sessions    Assessment     Progressed to IR walkouts for decreased pain with conc/ecc exercise. Continue to decrease load to see if isometrics will relieve some of his pain. Returned to LE strengthening with noted weakness to anterior tib on the R    Raffy Is progressing well towards his goals.   Pt prognosis is Excellent.     Pt will continue to benefit from skilled outpatient physical therapy to address the deficits listed in the problem list box on initial evaluation, provide pt/family education and to maximize pt's level of independence in the home and community environment.     Pt's spiritual, cultural and educational needs considered and pt agreeable to plan of care and goals.     Anticipated barriers to physical therapy: none    GOALS: Short Term Goals:  4 weeks  1.Report decreased shoulder pain < / =  3/10  to increase tolerance for return to playing music(Progressing, not met)  2. Increase PROM full motion without pain  (Progressing, not met)  3. Increased strength by 1/3 MMT grade in cuff to increase tolerance for ADL and work activities.(Progressing, not met)  4. Pt to tolerate HEP to improve ROM and independence with ADL's(Progressing, not met)     Long Term Goals: 8 weeks  1.Report decreased shoulder pain  < / =  1 /10  to increase tolerance for return to sleeping through the night(Progressing, not met)  2.Increase AROM to full motion without shoulder " pain(Progressing, not met)  3.Increase strength to >/= 4/5 in cuff and scap stabilizers to increase tolerance for ADL and work activities.(Progressing, not met)  4. Pt goal: return to playing music without shoulder pain (Progressing, not met)  5. Pt will have improved gcode of CJ (20-40% limited) on FOTO shoulder in order to demonstrate true functional improvement. (Progressing, not met)    Plan     Plan of care Certification: 11/28/2023 to 3/28/2024.     Improve L scap stabilization     Corwin Merritt, PT, DPT, OCS, FAAOMPT

## 2023-12-17 ENCOUNTER — PATIENT MESSAGE (OUTPATIENT)
Dept: REHABILITATION | Facility: HOSPITAL | Age: 73
End: 2023-12-17
Payer: COMMERCIAL

## 2023-12-17 NOTE — PROCEDURES
Raffy Rutherford  presents for follow up evaluation of   Encounter Diagnosis   Name Primary?    Dupuytren's contracture of right hand Yes       Overall the patient reports doing well. He is here today for his xiaflex injection to the right small and long fingers.    PE:    AA&O x 4.  NAD  HEENT:  NCAT, sclera nonicteric  Lungs:  Respirations are equal and unlabored.  CV:  2+ bilateral upper and lower extremity pulses.  MSK:  right small finger cord with 30 degree MP joint contracture, right long finger cord with 20 degree MP contracture. Neurovascularly intact bilaterally.  5/5 thenar and intrinsic musculature strength.     A/P:  right small and long finger Dupuytren's cord Xiaflex injection  1) Diagnosis : right small and long finger Dupuytren's contracture MP joint  Procedure:  right small and long finger Dupuytren's contracture MP joint  Consent: risks and benefits of procedure discussed as documented on the consent including swelling of the injection site or hand, bruising or bleeding at the injection site, pain or tenderness at the injection site, swelling of the lymph nodes in the elbow or armpit, itching, breaks in the skin, redness or warmth of the skin pain in the armpit and tendon rupture.      The patient was identified site verified and consent confirmed prior to starting the procedure and at at the safety pause in the room. The patient was prepped with alcohol.   Attention was then turned to the right small and long fingers. The collagenase had removed from refrigeration 15 min prior to the injection and had been mixed according the manufactures guidelines. After the small and long fingers were prepped with betadine a total of .25 mL of collagenase was injected into the cord of the small finger MP joint and 0.25 mL was injected in the the cord to the long finger MP joint. Care was taken to make sure the needle was injected into the cord . Care was taken that the needle remained perpendicular to the  cord and in the cord. The hand was then wrapped in a dressing. He tolerated the procedure. There were no complications. He was given post injections instructions to take it easy and leave the dressing intact. He was told to take ibuprofen for pain.      2) F/U 2 days for manipulation  3) Call with any questions/concerns in the interim        Kaye Solis MD    Please be aware that this note has been generated with the assistance of [employer] voice-to-text.  Please excuse any spelling or grammatical errors.

## 2023-12-26 ENCOUNTER — PATIENT MESSAGE (OUTPATIENT)
Dept: INTERNAL MEDICINE | Facility: CLINIC | Age: 73
End: 2023-12-26
Payer: COMMERCIAL

## 2023-12-26 ENCOUNTER — CLINICAL SUPPORT (OUTPATIENT)
Dept: REHABILITATION | Facility: HOSPITAL | Age: 73
End: 2023-12-26
Payer: COMMERCIAL

## 2023-12-26 DIAGNOSIS — G89.29 CHRONIC LEFT SHOULDER PAIN: Primary | ICD-10-CM

## 2023-12-26 DIAGNOSIS — E78.5 HYPERLIPIDEMIA, UNSPECIFIED HYPERLIPIDEMIA TYPE: Primary | ICD-10-CM

## 2023-12-26 DIAGNOSIS — M25.512 CHRONIC LEFT SHOULDER PAIN: Primary | ICD-10-CM

## 2023-12-26 PROCEDURE — 97530 THERAPEUTIC ACTIVITIES: CPT | Performed by: PHYSICAL THERAPIST

## 2023-12-26 PROCEDURE — 97112 NEUROMUSCULAR REEDUCATION: CPT | Performed by: PHYSICAL THERAPIST

## 2023-12-26 PROCEDURE — 97140 MANUAL THERAPY 1/> REGIONS: CPT | Performed by: PHYSICAL THERAPIST

## 2023-12-26 NOTE — PROGRESS NOTES
[facility]   Physical Therapy Treatment Note      Name: Raffy Rutherford Jr.  Clinic Number: [id number]    Therapy Diagnosis:   Encounter Diagnosis   Name Primary?    Chronic left shoulder pain Yes     Physician: Lisandra Trinh NP    Visit Date: 12/26/2023    Physician Orders: PT Eval and Treat   Medical Diagnosis from Referral: M67.922 (ICD-10-CM) - Biceps tendinopathy, left   Evaluation Date: 11/28/2023  Authorization Period Expiration: 11/27/2024  Plan of Care Expiration: 3/28/2024  Progress Note Due: 1/1/2024  Visit # / Visits authorized: 4/20  FOTO: 1/1    PTA Visit #: 0/5     Time In: 1400  Time Out: 1500  Total Billable Time: 60 minutes    Subjective     Pt reports: Had a cough last week. Notes after last session it wasn't as bad, and it felt better during the week when he was sick and not playing. Notes the pain returned around the same time he started lifting his 2 year old granddaughter. Called MD to speak about appt, possible surgery before ski trip March and grandchild in April    He was compliant with home exercise program.  Response to previous treatment: no change  Functional change: no changes    Pain: 5/10  Location: left shoulder      Objective      Objective Measures updated at progress report unless specified.     Treatment     Raffy received the treatments listed below:      therapeutic exercises to develop strength, endurance, ROM, flexibility, posture, and core stabilization for 0 minutes including:      manual therapy techniques: Joint mobilizations and Soft tissue Mobilization were applied to the: L shoulder for 10 minutes, including:  Gr I-II oscillations  Gr III flexion   Gr III IR/ER with centering glide  Dry needling to biceps brachii 4 single stick needles -NT    neuromuscular re-education activities to improve: Balance, Coordination, Sense, Proprioception, and Posture for 20 minutes. The following activities were included:    Sidelying ER 1# to neutral 2 x  "10  IR walkouts OTB 2 x 10  Shoulder extensions OTB 2 x 10    NT  Serratus press 3# 2 x 10    therapeutic activities to improve functional performance for 30 minutes, including:    AlterG walking 70% BW 15' 1.4 mph  Slot machines 3# 3 x 15  SLR supine 2 x 10    NT  Row with ER OTB 2 x 10    Bridges 2 x 15  Prone extensions 2# 3 x 12  Prone row 3# 3 x 12    Sidelying clam GTB to improve gait and balance 2 x 10 5" hold      Patient Education and Home Exercises       Education provided:   - proper scap stabilization, LE strengthening for weakness    Written Home Exercises Provided: YES. Exercises were reviewed and Raffy was able to demonstrate them prior to the end of the session.  Raffy demonstrated good understanding of the education provided. See EMR under Patient Instructions for exercises provided during therapy sessions    Assessment     Added alterG walking for chronic condition with previous positive results. Seems to have inreased symptoms due to increased load on biceps tendon with lifting grandchild. Will message MD to get patient scheduled sooner if possible    Raffy Is progressing well towards his goals.   Pt prognosis is Excellent.     Pt will continue to benefit from skilled outpatient physical therapy to address the deficits listed in the problem list box on initial evaluation, provide pt/family education and to maximize pt's level of independence in the home and community environment.     Pt's spiritual, cultural and educational needs considered and pt agreeable to plan of care and goals.     Anticipated barriers to physical therapy: none    GOALS: Short Term Goals:  4 weeks  1.Report decreased shoulder pain < / =  3/10  to increase tolerance for return to playing music(Progressing, not met)  2. Increase PROM full motion without pain  (Progressing, not met)  3. Increased strength by 1/3 MMT grade in cuff to increase tolerance for ADL and work activities.(Progressing, not met)  4. Pt to tolerate HEP " to improve ROM and independence with ADL's(Progressing, not met)     Long Term Goals: 8 weeks  1.Report decreased shoulder pain  < / =  1 /10  to increase tolerance for return to sleeping through the night(Progressing, not met)  2.Increase AROM to full motion without shoulder pain(Progressing, not met)  3.Increase strength to >/= 4/5 in cuff and scap stabilizers to increase tolerance for ADL and work activities.(Progressing, not met)  4. Pt goal: return to playing music without shoulder pain (Progressing, not met)  5. Pt will have improved gcode of CJ (20-40% limited) on FOTO shoulder in order to demonstrate true functional improvement. (Progressing, not met)    Plan     Plan of care Certification: 11/28/2023 to 3/28/2024.     Improve L scap stabilization     Corwin Merritt, PT, DPT, OCS, FAAOMPT

## 2023-12-27 ENCOUNTER — TELEPHONE (OUTPATIENT)
Dept: SPORTS MEDICINE | Facility: CLINIC | Age: 73
End: 2023-12-27
Payer: COMMERCIAL

## 2023-12-27 NOTE — TELEPHONE ENCOUNTER
----- Message from Jose E Varela sent at 12/27/2023 10:05 AM CST -----  Regarding: PT'S RETUNRING A CALL FROM CHRISTIANO  Contact: pt  Pt's requesting a call back..    Confirmed contact info below:  Contact Name: Raffy Rutherford  Phone Number: [phone]

## 2023-12-27 NOTE — TELEPHONE ENCOUNTER
Pt would like to go up on his dose for EMSAM patch to 12MG and would like to use [facility] for pharmacy

## 2024-01-02 ENCOUNTER — PATIENT MESSAGE (OUTPATIENT)
Dept: OPTOMETRY | Facility: CLINIC | Age: 74
End: 2024-01-02
Payer: COMMERCIAL

## 2024-01-02 RX ORDER — ATORVASTATIN CALCIUM 80 MG/1
80 TABLET, FILM COATED ORAL DAILY
Qty: 30 TABLET | Refills: 5 | Status: SHIPPED | OUTPATIENT
Start: 2024-01-02 | End: 2024-02-26

## 2024-01-02 NOTE — PROGRESS NOTES
CC: Left shoulder pain    Raffy Rutherford Jr., presents today for follow up evaluation of his left shoulder. At last appointment, 11/28/2023, patient underwent  ultrasound guided long head of biceps tendon sheath CSI . Patient reports partial relief for a very brief period of time but reports he went to PT the same day as the injection so thinks he may not have given it adequate time to rest.  The shoulder remains painful - again localized primarily over the anterior proximal biceps region.  Present with repetitive overhead activity and reaching out away from himself.  Conservative care to this point has been extensive to include a total of 3 corticosteroid injections (1 GH , 2 biceps sheath).  Both the prior glenohumeral and July and the initial biceps sheath steroid injections were of significant benefit but the most recent injection did not provide as much relief.  Prior MRI showed biceps split tearing with tendinopathy, SLAP pathology, shoulder glenohumeral chondromalacia and some low-grade partial-thickness rotator cuff tearing.  Additional conservative care has included therapy and oral medication.  At this point he feels that he likely will need surgery but does not want to proceed until perhaps later in the year.  He has a ski trip planned in March to [place] for an adaptive skiing program and would like to discuss options in terms of getting ready for that.  He is accompanied by his wife who is 1 of our wound care specialists - Valarie Rutherford.  She works currently in the colon rectal surgery department.      Prior Hx 11/28/2023:   Raffy Rutherford Jr., a 73 y.o. male, presents today for follow up evaluation of his left shoulder.  Diagnosis of symptomatic SLAP tear, partial-thickness rotator cuff tear, biceps tendinitis.  Conservative care to this point.  At last appointment, 8/29/2023, patient underwent  long head of biceps tendon sheath CSI under ultrasound guidance which provided excellent near complete relief  for a few months.  He would like to repeat that injection.  We have also discussed surgery in the past.  The patient would like to hold off on that.  He also would like to switch physical therapy back to [place] from [facility].  For logistical reasons.    Prior Hx 8/29/2023:   Raffy returns today for follow up of left shoulder pain. Received CSI at previous appointment, reports 100 % relief for 5-6 weeks.  Overall much better after this injection but does have now some recurrent pain and somewhat different location.  Localizes anteriorly over the proximal biceps groove.  This does bother him with activity.  He would like to discuss treatment options for this.  Going to PT with Ronald Zurita for his back and hip and OT with Vickie Perkins.  Under the care of my colleague Dr. Kaye Solis for his right hand.    Prior Hx 7/11/23:  Raffy returns to to discuss a possible left shoulder repeat steroid injection.  I saw him for the 1st time virtually recently.  MRI results reviewed.  He has low-grade undersurface cuff tearing with some degenerative changes within the glenohumeral joint and SLAP tearing/biceps tendinopathy.  Complaining of chronic left shoulder pain. He has been seen by multiple mid-level providers over in the General Orthopedic Department.  He was given a subacromial steroid injection in been March which provided approximately 70% relief in pain for a few weeks.  Additionally he has had physical therapy at the [facility] location with Ronald Zurita.  Has taken Celebrex and Mobic a different times.  Chief complaint of ongoing left shoulder pain worse with overhead activity and after playing cello.  Today he localizes over his medial upper arm, occasionally superolateral with provocative movements.  He describes intermittent clicking and catching in the shoulder which is deep.  This is bothersome.  Denies any antecedent injury mechanism.      PMHx notable for cervical and lumbar spine DJD, bilateral foot  drop  PSHx notable for left SHARAD 2012, L3-5 fusion 2015, L5-S1 fusion 2020  Negative for tobacco.   Negative for diabetes.    PAST MEDICAL HISTORY:   Past Medical History:   Diagnosis Date    Allergy     Arthritis     Back pain     after trauma beginning in 89    Cataract     Chronic pain     neck and left shoulder    Cluster headache 2013    Colon polyps 2007 2007-2019: TA x5, HP x3    Degenerative disc disease     Depression     Diverticulitis 12/2013    Dry eye syndrome     Fibromyalgia 2013    GERD (gastroesophageal reflux disease)     Hepatitis 1970's    A    History of prostate biopsy 2002    Hyperlipidemia     Joint pain     Prostate cancer 07/2021    PVD (posterior vitreous detachment)     Salzmann's nodular dystrophy of left eye     Sleep apnea     Thyroid nodule 07/16/2014    Trigeminal neuralgia of left side of face     Tubular adenoma of colon 2007    5 removed [id number]    Visual disturbance 2012    problems after cataract surgery     PAST SURGICAL HISTORY:  Past Surgical History:   Procedure Laterality Date    BACK SURGERY      CARPAL TUNNEL RELEASE Right 5/18/2021    Procedure: RELEASE, CARPAL TUNNEL;  Surgeon: Kaye Solis MD;  Location: [facility] OR;  Service: Orthopedics;  Laterality: Right;    CATARACT EXTRACTION W/  INTRAOCULAR LENS IMPLANT Bilateral     CHOLECYSTECTOMY      COLONOSCOPY N/A 12/17/2019    Normal - Repeat 5yrs    COLONOSCOPY  2007 2007 TA x2, 2011 TA x3, 2014 HP x3, 2019 normal    COSMETIC SURGERY  2/10/2015    Direct mid-forehead brow lift    COSMETIC SURGERY  2/10/2015    Bilateral upper lid blepahroplasty    CYSTOSCOPY WITH URETEROSCOPY, RETROGRADE PYELOGRAPHY, AND INSERTION OF STENT Left 8/19/2020    Procedure: CYSTOSCOPY, WITH RETROGRADE PYELOGRAM AND URETERAL STENT INSERTION;  Surgeon: Katelynn George MD;  Location: [facility] OR;  Service: Urology;  Laterality: Left;    ESOPHAGOGASTRODUODENOSCOPY N/A 12/17/2019    Procedure: ESOPHAGOGASTRODUODENOSCOPY (EGD);  Surgeon: Amadou STONER  MD Wilfred;  Location: [facility] ENDO (4TH FLR);  Service: Endoscopy;  Laterality: N/A;    EYE SURGERY      FUSION OF LUMBAR SPINE BY ANTERIOR APPROACH N/A 8/20/2020    Procedure: FUSION, SPINE, LUMBAR, ANTERIOR APPROACH L5-S1 ALIF Stand Alone;  Surgeon: Jason Caldwell MD;  Location: [facility];  Service: Neurosurgery;  Laterality: N/A;    HEMORRHOID SURGERY      with complication of chronic bleeding for 6 weeks     INJECTION OF STEROID Right 12/6/2018    Procedure: INJECTION, STEROID Right SI Joint Block and Steroid Injection;  Surgeon: Jason Caldwell MD;  Location: [facility] OR;  Service: Neurosurgery;  Laterality: Right;  Procedure: Right SI Joint Block and Steroid Injection  Surgery Time: 30 MIN  LOS: 0  Anesthesia: MAC  Radiology: C-arm  Bed: Sledge 4 Poster  Position: Prone    INJECTION OF STEROID Right 9/19/2019    Procedure: INJECTION, STEROID Procedure: Right SI joint block nd steroid injection;  Surgeon: Jason Caldwell MD;  Location: [facility];  Service: Neurosurgery;  Laterality: Right;  Procedure: Right SI joint block nd steroid injection  Surgery Time: 30 mins  LOS:   Anesthesia: General MAC  Radiology:C-arm  Bed: Regular Bed  Position: Prone    IRRIGATION AND DEBRIDEMENT OF UPPER EXTREMITY Left 4/7/2022    Procedure: IRRIGATION AND DEBRIDEMENT, UPPER EXTREMITY;  Surgeon: Kaye Solis MD;  Location: [facility];  Service: Orthopedics;  Laterality: Left;    JOINT REPLACEMENT      KNEE SURGERY      involving arthroscopic surgery to both knees    REPAIR OF EXTENSOR TENDON Left 4/7/2022    Procedure: REPAIR, TENDON, EXTENSOR thumb, EPB and EPL;  Surgeon: Kaye Solis MD;  Location: [facility];  Service: Orthopedics;  Laterality: Left;    SINUS SURGERY      left molar and sinus surgery for trigeminal neuralgia    SPINAL FUSION  06/22/2015    L3-L5 XLIF/TANA    TOTAL HIP ARTHROPLASTY  4/2012    Pt states he had total hip replacement on his left hip.    ULNAR NERVE TRANSPOSITION Left 12/16/2020    Procedure:  TRANSPOSITION, NERVE, ULNAR - left carpal and cubital tunnel releases;  Surgeon: Addi Bauer MD;  Location: [facility] OR;  Service: Orthopedics;  Laterality: Left;     FAMILY HISTORY:  Family History   Problem Relation Age of Onset    Stroke Mother 86    Colon cancer Mother         early/mid-60s    Uterine cancer Mother 77    Pancreatitis Brother         acute, now s/p nathalia    Diabetes Brother     Macular degeneration Brother     Other Brother         foot drop    Other Father 44        pituitary tumor- CA vs benign?    Heart attack Maternal Grandfather         suspected, 50s    Migraines Sister     Crohn's disease Sister         w/ assoc joint issues    Arthritis Sister     Tremor Daughter     Irritable bowel syndrome Son         leaning toward Crohn's dx    Neurological Disorders Son         nonspecific, benign fasciculations of calves    Tremor Son     Jaundice Grandchild     Other Maternal Uncle         memory issue    Other Maternal Uncle         memory issue    Cancer Maternal Cousin         origin? maybe thyroid? 40s?    Melanoma Neg Hx     Amblyopia Neg Hx     Blindness Neg Hx     Cataracts Neg Hx     Glaucoma Neg Hx     Hypertension Neg Hx     Retinal detachment Neg Hx     Strabismus Neg Hx     Thyroid disease Neg Hx     Allergic rhinitis Neg Hx     Allergies Neg Hx     Angioedema Neg Hx     Asthma Neg Hx     Eczema Neg Hx     Urticaria Neg Hx     Rhinitis Neg Hx     Immunodeficiency Neg Hx     Atopy Neg Hx      MEDICATIONS:    Current Outpatient Medications:     atorvastatin (LIPITOR) 40 MG tablet, Take 2 tablets (80 mg total) by mouth once daily., Disp: 60 tablet, Rfl: 5    butalbital-acetaminophen-caffeine -40 mg (FIORICET, ESGIC) -40 mg per tablet, Take 1 tablet by mouth daily as needed for 30 days., Disp: 30 tablet, Rfl: 0    celecoxib (CELEBREX) 100 MG capsule, Take 2 capsules (200 mg total) by mouth 2 (two) times daily., Disp: 180 capsule, Rfl: 3    clindamycin (CLEOCIN) 150 MG capsule,  Take 4 capsules (600 mg total) by mouth 1 hour prior to dental appointment., Disp: 12 capsule, Rfl: 1    clonazePAM (KLONOPIN) 0.5 MG tablet, Take 1 tablet by mouth twice a day as needed for anxiety, Disp: 60 tablet, Rfl: 5    doxycycline (VIBRAMYCIN) 100 MG Cap, Take 1 capsule (100 mg total) by mouth every 12 (twelve) hours., Disp: 10 capsule, Rfl: 0    ergocalciferol (VITAMIN D2) 50,000 unit Cap, Take 1 capsule (50,000 Units total) by mouth every 7 days., Disp: 12 capsule, Rfl: 3    fremanezumab-vfrm (AJOVY SYRINGE) 225 mg/1.5 mL injection, Inject 1.5 mLs (225 mg total) into the skin every 28 days., Disp: 1.5 mL, Rfl: 5    HYDROcodone-acetaminophen (NORCO) 5-325 mg per tablet, Take 1 tablet by mouth every 8 (eight) hours as needed for Pain., Disp: 90 tablet, Rfl: 0    HYDROcodone-acetaminophen (NORCO) 5-325 mg per tablet, Take 1 tablet by mouth four times daily as needed for 30 days, Disp: 120 tablet, Rfl: 0    hydrocortisone (ANUSOL-HC) 25 mg suppository, Place 1 suppository (25 mg total) rectally 2 (two) times daily. for 24 days, Disp: 24 suppository, Rfl: 1    hydrocortisone-pramoxine (ANALPRAM-HC) 2.5-1 % Crea, Place rectally 3 (three) times daily., Disp: 30 g, Rfl: 2    lamoTRIgine (LAMICTAL) 200 MG tablet, Take 1 tablet (200 mg total) by mouth once daily., Disp: 90 tablet, Rfl: 3    perfluorohexyloctane, PF, 100 % Drop, Place 1 drop into both eyes 4 (four) times daily., Disp: 3 mL, Rfl: 11    pregabalin (LYRICA) 225 MG Cap, Take 1 capsule (225 mg total) by mouth every evening., Disp: 93 capsule, Rfl: 3    pregabalin (LYRICA) 25 MG capsule, Take 2 capsules (50 mg total) by mouth once daily., Disp: 90 capsule, Rfl: 1    pregabalin (LYRICA) 25 MG capsule, Take 1 capsule (25 mg total) by mouth 2 (two) times a day., Disp: 180 capsule, Rfl: 0    pregabalin (LYRICA) 25 MG capsule, Take 1 capsule by mouth twice daily, Disp: 180 capsule, Rfl: 0    pregabalin (LYRICA) 75 MG capsule, Take 3 capsules by mouth at night,  Disp: 90 capsule, Rfl: 1    pregabalin (LYRICA) 75 MG capsule, Take 3 capsules by mouth at night, Disp: 270 capsule, Rfl: 0    RABEprazole (ACIPHEX) 20 mg tablet, Take 1 tablet (20 mg total) by mouth 2 (two) times daily., Disp: 180 tablet, Rfl: 3    selegiline (EMSAM) 12 mg/24 hr, Place 1 patch onto the skin once daily., Disp: 30 patch, Rfl: 0    sildenafiL (VIAGRA) 100 MG tablet, Take 1 tablet (100 mg total) by mouth as needed for Erectile Dysfunction (take 30-60 minutes before on empty stomach). Take one hour before., Disp: 4 tablet, Rfl: 12    tadalafiL (CIALIS) 10 MG tablet, Take 1 tablet (10 mg total) by mouth daily as needed for Erectile Dysfunction., Disp: 30 tablet, Rfl: 11    traZODone (DESYREL) 50 MG tablet, Take 1 tablet (50 mg total) by mouth every evening., Disp: 90 tablet, Rfl: 1    ubrogepant (UBRELVY) 100 mg tablet, Take 1 tablet (100 mg total) by mouth every 2 (two) hours as needed for Migraine. Max 200 mg/day., Disp: 16 tablet, Rfl: 5    UNABLE TO FIND, medication name: lidocaine 4% NS, Disp: , Rfl:     varenicline (TYRVAYA) 0.03 mg/spray sprm, 1 spray by Each Nostril route 2 (two) times a day., Disp: 8.4 mL, Rfl: 11    Current Facility-Administered Medications:     onabotulinumtoxina injection 200 Units, 200 Units, Intramuscular, q12 weeks, Macie Pearson, MIKAYLA, 200 Units at 11/09/23 1109    ALLERGIES:  Review of patient's allergies indicates:   Allergen Reactions    Alphagan [brimonidine]      Patient taking MASO-B Selective Inhibitor Selegiline (Emsam)    Coumadin [warfarin]      itch    Oxycodone      hiccups     REVIEW OF SYSTEMS:  Constitution: Negative. Negative for chills, fever and night sweats.    Hematologic/Lymphatic: Negative for bleeding problem. Does not bruise/bleed easily.   Skin: Negative for dry skin, itching and rash.   Musculoskeletal: Negative for falls. Positive for left shoulder pain and muscle weakness.     All other review of symptoms were reviewed and found to be  "noncontributory.    PHYSICAL EXAMINATION:  Vitals:  /74   Pulse (!) 59   Ht 5' 8" (1.727 m)   Wt 83.3 kg (183 lb 10.3 oz)   BMI 27.92 kg/m²    General: Well-developed well-nourished 73 y.o. malein no acute distress   Cardiovascular: Regular rhythm by palpation of distal pulse, normal color and temperature, no concerning varicosities on symptomatic side   Lungs: No labored breathing or wheezing appreciated   Neuro: Alert and oriented ×3   Psychiatric: well oriented to person, place and time, demonstrates normal mood and affect   Skin: No rashes, lesions or ulcers, normal temperature, turgor, and texture on uninvolved extremity    Ortho/SPM Exam  Examination of the left shoulder again demonstrates active forward elevation to 160, ER with arm at side to 60, IR to T10.  Intact overhead passive range of motion.  Prominent pain over the proximal biceps groove.  Positive modified speed's test.  Minimal tenderness to palpation over the posterior glenohumeral joint line. 4+ out of 5 resisted scaption without much pain.  Mildly positive glenohumeral grind test.  No scapular winging.  Intact cervical neck range of motion.    IMAGING:  Prior Xrays including AP, Outlet and Axillary Lateral of Left shoulder are ordered / images reviewed by me:   Mild DJD.    MRI left shoulder 5/2023:  1. Supraspinatus tendinosis with articular surface fraying and a small, low-grade partial-thickness interstitial/concealed footprint tear.  2. Infraspinatus and subscapularis tendinosis.  3. SLAP tear that involves the biceps anchor.  4. Biceps tendinosis with partial-thickness interstitial tearing.  5. Mild glenohumeral osteoarthritis.  6. Subacromial/subdeltoid bursitis.  7. AC joint arthrosis.    ASSESSMENT:      ICD-10-CM ICD-9-CM   1. Biceps tendinopathy, left  M67.922 727.9   2. Superior glenoid labrum lesion of left shoulder, initial encounter  S43.432A 840.7   3. Chondromalacia of left shoulder  M94.212 733.92   4. Incomplete tear " of left rotator cuff, unspecified whether traumatic  M75.112 840.4   5. Chronic left shoulder pain  M25.512 719.41    G89.29 338.29       PLAN:     Imaging reviewed with the patient and his wife once more.  Discussed the status of the shoulder.  At this point I do think we need to consider surgery form of arthroscopic extensive debridement, open subpectoral biceps tenodesis, and rotator cuff treatment as indicated.  The details of that surgery were discussed to include the expected postop rehab and recovery course.  He does not feel that his current degree of symptoms warrant proceeding with surgery at this time.  He also has an upcoming trip as outlined above.  We discussed options for management.  I am okay providing a repeat biceps sheath corticosteroid injection around the time of his trip but certainly at this point can not guarantee any specific pain relief.  We also have discussed the risk for biceps auto rupture and recurrent steroid injections in the area.  We could consider a Medrol Dosepak also around the time of his trip.  Discussed altering some of his medication management with Celebrex.  Continue modified activities.  I will see him back in March before his trip.    Procedures

## 2024-01-02 NOTE — TELEPHONE ENCOUNTER
No care due was identified.  [facility] Embedded Care Due Messages. Reference number: [id number].   1/02/2024 11:33:30 AM CST

## 2024-01-04 ENCOUNTER — OFFICE VISIT (OUTPATIENT)
Dept: SPORTS MEDICINE | Facility: CLINIC | Age: 74
End: 2024-01-04
Payer: COMMERCIAL

## 2024-01-04 VITALS
DIASTOLIC BLOOD PRESSURE: 74 MMHG | WEIGHT: 183.63 LBS | HEART RATE: 59 BPM | SYSTOLIC BLOOD PRESSURE: 121 MMHG | HEIGHT: 68 IN | BODY MASS INDEX: 27.83 KG/M2

## 2024-01-04 DIAGNOSIS — G89.29 CHRONIC LEFT SHOULDER PAIN: ICD-10-CM

## 2024-01-04 DIAGNOSIS — M67.922 BICEPS TENDINOPATHY, LEFT: Primary | ICD-10-CM

## 2024-01-04 DIAGNOSIS — M94.212 CHONDROMALACIA OF LEFT SHOULDER: ICD-10-CM

## 2024-01-04 DIAGNOSIS — S43.432A SUPERIOR GLENOID LABRUM LESION OF LEFT SHOULDER, INITIAL ENCOUNTER: ICD-10-CM

## 2024-01-04 DIAGNOSIS — M25.512 CHRONIC LEFT SHOULDER PAIN: ICD-10-CM

## 2024-01-04 DIAGNOSIS — M75.112 INCOMPLETE TEAR OF LEFT ROTATOR CUFF, UNSPECIFIED WHETHER TRAUMATIC: ICD-10-CM

## 2024-01-04 PROCEDURE — 3288F FALL RISK ASSESSMENT DOCD: CPT | Mod: CPTII,S$GLB,, | Performed by: ORTHOPAEDIC SURGERY

## 2024-01-04 PROCEDURE — 3008F BODY MASS INDEX DOCD: CPT | Mod: CPTII,S$GLB,, | Performed by: ORTHOPAEDIC SURGERY

## 2024-01-04 PROCEDURE — 1101F PT FALLS ASSESS-DOCD LE1/YR: CPT | Mod: CPTII,S$GLB,, | Performed by: ORTHOPAEDIC SURGERY

## 2024-01-04 PROCEDURE — 3074F SYST BP LT 130 MM HG: CPT | Mod: CPTII,S$GLB,, | Performed by: ORTHOPAEDIC SURGERY

## 2024-01-04 PROCEDURE — 1125F AMNT PAIN NOTED PAIN PRSNT: CPT | Mod: CPTII,S$GLB,, | Performed by: ORTHOPAEDIC SURGERY

## 2024-01-04 PROCEDURE — 1157F ADVNC CARE PLAN IN RCRD: CPT | Mod: CPTII,S$GLB,, | Performed by: ORTHOPAEDIC SURGERY

## 2024-01-04 PROCEDURE — 99214 OFFICE O/P EST MOD 30 MIN: CPT | Mod: S$GLB,,, | Performed by: ORTHOPAEDIC SURGERY

## 2024-01-04 PROCEDURE — 3078F DIAST BP <80 MM HG: CPT | Mod: CPTII,S$GLB,, | Performed by: ORTHOPAEDIC SURGERY

## 2024-01-04 PROCEDURE — 99999 PR PBB SHADOW E&M-EST. PATIENT-LVL III: CPT | Mod: PBBFAC,,, | Performed by: ORTHOPAEDIC SURGERY

## 2024-01-08 ENCOUNTER — CLINICAL SUPPORT (OUTPATIENT)
Dept: REHABILITATION | Facility: HOSPITAL | Age: 74
End: 2024-01-08
Payer: COMMERCIAL

## 2024-01-08 DIAGNOSIS — M25.512 CHRONIC LEFT SHOULDER PAIN: Primary | ICD-10-CM

## 2024-01-08 DIAGNOSIS — G89.29 CHRONIC LEFT SHOULDER PAIN: Primary | ICD-10-CM

## 2024-01-08 PROCEDURE — 97530 THERAPEUTIC ACTIVITIES: CPT | Performed by: PHYSICAL THERAPIST

## 2024-01-08 PROCEDURE — 97140 MANUAL THERAPY 1/> REGIONS: CPT | Performed by: PHYSICAL THERAPIST

## 2024-01-08 PROCEDURE — 97112 NEUROMUSCULAR REEDUCATION: CPT | Performed by: PHYSICAL THERAPIST

## 2024-01-09 NOTE — PROGRESS NOTES
[facility]   Physical Therapy Treatment Note      Name: Raffy Rutherford Jr.  Clinic Number: [id number]    Therapy Diagnosis:   Encounter Diagnosis   Name Primary?    Chronic left shoulder pain Yes     Physician: Davin Castrejon DO    Visit Date: 1/8/2024    Physician Orders: PT Eval and Treat   Medical Diagnosis from Referral: M67.922 (ICD-10-CM) - Biceps tendinopathy, left   Evaluation Date: 11/28/2023  Authorization Period Expiration: 11/27/2024  Plan of Care Expiration: 3/28/2024  Progress Note Due: 1/1/2024  Visit # / Visits authorized: 5/20  FOTO: 1/1    PTA Visit #: 0/5     Time In: 900  Time Out: 1000  Total Billable Time: 30 minutes    Subjective     Pt reports: I was better about not picking up my granddaughter but it's difficult. Likely surgery in April after my trip. Still playing music    He was compliant with home exercise program.  Response to previous treatment: no change  Functional change: no changes    Pain: 5/10  Location: left shoulder      Objective      Objective Measures updated at progress report unless specified.     Treatment     Raffy received the treatments listed below:      therapeutic exercises to develop strength, endurance, ROM, flexibility, posture, and core stabilization for 0 minutes including:      manual therapy techniques: Joint mobilizations and Soft tissue Mobilization were applied to the: L shoulder for 10 minutes, including:  Gr I-II oscillations  Gr III flexion   Gr III IR/ER with centering glide      neuromuscular re-education activities to improve: Balance, Coordination, Sense, Proprioception, and Posture for 35 minutes. The following activities were included:    Sidelying flexion dowel 3 x 15  Sidelying serratus dowel 3 x 15  Supine flexion OTB 2 x 10  Supine IR/ER 2# controlled at 45 deg abd     NT  IR walkouts OTB 2 x 10  Shoulder extensions OTB 2 x 10  Serratus press 3# 2 x 10    therapeutic activities to improve functional performance for 15  "minutes, including:    AlterG walking 70% BW 15' 1.4 mph      NT  Slot machines 3# 3 x 15  SLR supine 2 x 10  Row with ER OTB 2 x 10  Bridges 2 x 15  Prone extensions 2# 3 x 12  Prone row 3# 3 x 12  Sidelying clam GTB to improve gait and balance 2 x 10 5" hold      Patient Education and Home Exercises       Education provided:   - proper scap stabilization, LE strengthening for weakness    Written Home Exercises Provided: YES. Exercises were reviewed and Raffy was able to demonstrate them prior to the end of the session.  Raffy demonstrated good understanding of the education provided. See EMR under Patient Instructions for exercises provided during therapy sessions    Assessment     Limited load on the shoulder due to pain following previous sessions and with loading at home. Worked on better motor control with controlling shoulder through arc of motion and serratus press with dowel.     Raffy Is progressing well towards his goals.   Pt prognosis is Excellent.     Pt will continue to benefit from skilled outpatient physical therapy to address the deficits listed in the problem list box on initial evaluation, provide pt/family education and to maximize pt's level of independence in the home and community environment.     Pt's spiritual, cultural and educational needs considered and pt agreeable to plan of care and goals.     Anticipated barriers to physical therapy: none    GOALS: Short Term Goals:  4 weeks  1.Report decreased shoulder pain < / =  3/10  to increase tolerance for return to playing music(Progressing, not met)  2. Increase PROM full motion without pain  (Progressing, not met)  3. Increased strength by 1/3 MMT grade in cuff to increase tolerance for ADL and work activities.(Progressing, not met)  4. Pt to tolerate HEP to improve ROM and independence with ADL's(Progressing, not met)     Long Term Goals: 8 weeks  1.Report decreased shoulder pain  < / =  1 /10  to increase tolerance for return to " sleeping through the night(Progressing, not met)  2.Increase AROM to full motion without shoulder pain(Progressing, not met)  3.Increase strength to >/= 4/5 in cuff and scap stabilizers to increase tolerance for ADL and work activities.(Progressing, not met)  4. Pt goal: return to playing music without shoulder pain (Progressing, not met)  5. Pt will have improved gcode of CJ (20-40% limited) on FOTO shoulder in order to demonstrate true functional improvement. (Progressing, not met)    Plan     Plan of care Certification: 11/28/2023 to 3/28/2024.     Improve L scap stabilization     Corwin Merritt, PT, DPT, OCS, FAAOMPT

## 2024-01-10 ENCOUNTER — CLINICAL SUPPORT (OUTPATIENT)
Dept: REHABILITATION | Facility: HOSPITAL | Age: 74
End: 2024-01-10
Payer: COMMERCIAL

## 2024-01-10 DIAGNOSIS — G89.29 CHRONIC LEFT SHOULDER PAIN: Primary | ICD-10-CM

## 2024-01-10 DIAGNOSIS — M25.512 CHRONIC LEFT SHOULDER PAIN: Primary | ICD-10-CM

## 2024-01-10 PROCEDURE — 97140 MANUAL THERAPY 1/> REGIONS: CPT | Performed by: PHYSICAL THERAPIST

## 2024-01-10 PROCEDURE — 97112 NEUROMUSCULAR REEDUCATION: CPT | Performed by: PHYSICAL THERAPIST

## 2024-01-10 PROCEDURE — 97530 THERAPEUTIC ACTIVITIES: CPT | Performed by: PHYSICAL THERAPIST

## 2024-01-10 NOTE — PROGRESS NOTES
"[facility]   Physical Therapy Treatment Note      Name: Raffy Rutherford Jr.  Clinic Number: [id number]    Therapy Diagnosis:   Encounter Diagnosis   Name Primary?    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 Notes the shoulder felt good after last treatment, not painful    He was compliant with home exercise program.  Response to previous treatment: no change  Functional change: no changes    Pain: 5/10  Location: left shoulder      Objective      Objective Measures updated at progress report unless specified.     Treatment     Raffy received the treatments listed below:      therapeutic exercises to develop strength, endurance, ROM, flexibility, posture, and core stabilization for 0 minutes including:      manual therapy techniques: Joint mobilizations and Soft tissue Mobilization were applied to the: L shoulder for 10 minutes, including:  Gr I-II oscillations  Gr III flexion   Gr III IR/ER with centering glide      neuromuscular re-education activities to improve: Balance, Coordination, Sense, Proprioception, and Posture for 25 minutes. The following activities were included:    Supine T OTB 2 x 10  Shoulder extensions OTB 2 x 10  No money yellow loop 2 x 10 3"    NT  Sidelying flexion dowel 3 x 15  Sidelying serratus dowel 3 x 15  Supine flexion OTB 2 x 10  Supine IR/ER 2# controlled at 45 deg abd   IR walkouts OTB 2 x 10    Serratus press 3# 2 x " "10    therapeutic activities to improve functional performance for 25 minutes, including:    AlterG walking 70% BW 15' 1.4 mph  SLR supine 2 x 10  Scaptions 1# 2 x 10    NT  Slot machines 3# 3 x 15  Row with ER OTB 2 x 10  Bridges 2 x 15  Prone extensions 2# 3 x 12  Prone row 3# 3 x 12  Sidelying clam GTB to improve gait and balance 2 x 10 5" hold      Patient Education and Home Exercises       Education provided:   - proper scap stabilization, LE strengthening for weakness    Written Home Exercises Provided: YES. Exercises were reviewed and Raffy was able to demonstrate them prior to the end of the session.  Raffy demonstrated good understanding of the education provided. See EMR under Patient Instructions for exercises provided during therapy sessions    Assessment     Progressed with LE strengthening on alterG, new braces worn today. Good results from last session plus not playing. Able to perform isometrics to the cuff and scaptions pain free with light weights. Emailed new HEP to continue    Raffy Is progressing well towards his goals.   Pt prognosis is Excellent.     Pt will continue to benefit from skilled outpatient physical therapy to address the deficits listed in the problem list box on initial evaluation, provide pt/family education and to maximize pt's level of independence in the home and community environment.     Pt's spiritual, cultural and educational needs considered and pt agreeable to plan of care and goals.     Anticipated barriers to physical therapy: none    GOALS: Short Term Goals:  4 weeks  1.Report decreased shoulder pain < / =  3/10  to increase tolerance for return to playing music(Progressing, not met)  2. Increase PROM full motion without pain  (Progressing, not met)  3. Increased strength by 1/3 MMT grade in cuff to increase tolerance for ADL and work activities.(Progressing, not met)  4. Pt to tolerate HEP to improve ROM and independence with ADL's(Progressing, not met)     Long " Term Goals: 8 weeks  1.Report decreased shoulder pain  < / =  1 /10  to increase tolerance for return to sleeping through the night(Progressing, not met)  2.Increase AROM to full motion without shoulder pain(Progressing, not met)  3.Increase strength to >/= 4/5 in cuff and scap stabilizers to increase tolerance for ADL and work activities.(Progressing, not met)  4. Pt goal: return to playing music without shoulder pain (Progressing, not met)  5. Pt will have improved gcode of CJ (20-40% limited) on FOTO shoulder in order to demonstrate true functional improvement. (Progressing, not met)    Plan     Plan of care Certification: 11/28/2023 to 3/28/2024.     Improve L scap stabilization     Corwin Merritt, PT, DPT, OCS, FAAOMPT

## 2024-01-16 ENCOUNTER — TELEPHONE (OUTPATIENT)
Dept: NEUROLOGY | Facility: CLINIC | Age: 74
End: 2024-01-16
Payer: COMMERCIAL

## 2024-01-16 DIAGNOSIS — G43.709 CHRONIC MIGRAINE WITHOUT AURA WITHOUT STATUS MIGRAINOSUS, NOT INTRACTABLE: Primary | ICD-10-CM

## 2024-01-17 ENCOUNTER — PATIENT MESSAGE (OUTPATIENT)
Dept: RADIATION ONCOLOGY | Facility: CLINIC | Age: 74
End: 2024-01-17
Payer: COMMERCIAL

## 2024-01-17 ENCOUNTER — PATIENT MESSAGE (OUTPATIENT)
Dept: OPTOMETRY | Facility: CLINIC | Age: 74
End: 2024-01-17
Payer: COMMERCIAL

## 2024-01-18 ENCOUNTER — TELEPHONE (OUTPATIENT)
Dept: OPHTHALMOLOGY | Facility: CLINIC | Age: 74
End: 2024-01-18
Payer: COMMERCIAL

## 2024-01-18 ENCOUNTER — CLINICAL SUPPORT (OUTPATIENT)
Dept: REHABILITATION | Facility: HOSPITAL | Age: 74
End: 2024-01-18
Payer: COMMERCIAL

## 2024-01-18 DIAGNOSIS — G89.29 CHRONIC LEFT SHOULDER PAIN: Primary | ICD-10-CM

## 2024-01-18 DIAGNOSIS — M25.512 CHRONIC LEFT SHOULDER PAIN: Primary | ICD-10-CM

## 2024-01-18 PROCEDURE — 97112 NEUROMUSCULAR REEDUCATION: CPT | Performed by: PHYSICAL THERAPIST

## 2024-01-18 PROCEDURE — 97530 THERAPEUTIC ACTIVITIES: CPT | Performed by: PHYSICAL THERAPIST

## 2024-01-18 PROCEDURE — 97140 MANUAL THERAPY 1/> REGIONS: CPT | Performed by: PHYSICAL THERAPIST

## 2024-01-18 NOTE — PROGRESS NOTES
"[facility]   Physical Therapy Treatment Note      Name: Raffy Rutherford Jr.  Clinic Number: [id number]    Therapy Diagnosis:   Encounter Diagnosis   Name Primary?    Chronic left shoulder pain Yes     Physician: aDvin Castrejon DO    Visit Date: 1/18/2024    Physician Orders: PT Eval and Treat   Medical Diagnosis from Referral: M67.922 (ICD-10-CM) - Biceps tendinopathy, left   Evaluation Date: 11/28/2023  Authorization Period Expiration: 11/27/2024  Plan of Care Expiration: 3/28/2024  Progress Note Due: 1/1/2024  Visit # / Visits authorized: 3/20  Total visits: 7  FOTO: 1/1    PTA Visit #: 0/5     Time In: 1500  Time Out: 1600  Total Billable Time: 60 minutes    Subjective     Pt reports: Today is the best it has felt. Want to do more for more foot and ankle strengthening, not pushing myself too hard in the alterG    He was compliant with home exercise program.  Response to previous treatment: no change  Functional change: no changes    Pain: 5/10  Location: left shoulder      Objective      Objective Measures updated at progress report unless specified.     Treatment     Raffy received the treatments listed below:      therapeutic exercises to develop strength, endurance, ROM, flexibility, posture, and core stabilization for 0 minutes including:      manual therapy techniques: Joint mobilizations and Soft tissue Mobilization were applied to the: L shoulder for 10 minutes, including:  Gr I-II oscillations  Gr III flexion   Gr III IR/ER with centering glide      neuromuscular re-education activities to improve: Balance, Coordination, Sense, Proprioception, and Posture for 25 minutes. The following activities were included:    Seated ankle dorsiflexion 2 x 10  No money OTB 2 x 10 3"  Serratus press 3# 2 x 10    NT  Supine T OTB 2 x 10  Shoulder extensions OTB 2 x 10  Sidelying flexion dowel 3 x 15  Sidelying serratus dowel 3 x 15  Supine flexion OTB 2 x 10  Supine IR/ER 2# controlled at 45 deg " "abd   IR walkouts OTB 2 x 10      therapeutic activities to improve functional performance for 25 minutes, including:    AlterG walking 70% BW 15' 1.4 mph  SLR supine 2 x 10  Scaptions 1# 2 x 10    NT  Slot machines 3# 3 x 15  Row with ER OTB 2 x 10  Bridges 2 x 15  Prone extensions 2# 3 x 12  Prone row 3# 3 x 12  Sidelying clam GTB to improve gait and balance 2 x 10 5" hold      Patient Education and Home Exercises       Education provided:   - proper scap stabilization, LE strengthening for weakness    Written Home Exercises Provided: YES. Exercises were reviewed and Raffy was able to demonstrate them prior to the end of the session.  Raffy demonstrated good understanding of the education provided. See EMR under Patient Instructions for exercises provided during therapy sessions    Assessment     Tolerated walking on the alterG well today. Progressed with serratus and scap stability, light cuff with no money and avoiding overhead lifting. 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   Pt prognosis is Excellent.     Pt will continue to benefit from skilled outpatient physical therapy to address the deficits listed in the problem list box on initial evaluation, provide pt/family education and to maximize pt's level of independence in the home and community environment.     Pt's spiritual, cultural and educational needs considered and pt agreeable to plan of care and goals.     Anticipated barriers to physical therapy: none    GOALS: Short Term Goals:  4 weeks  1.Report decreased shoulder pain < / =  3/10  to increase tolerance for return to playing music(Progressing, not met)  2. Increase PROM full motion without pain  (Progressing, not met)  3. Increased strength by 1/3 MMT grade in cuff to increase tolerance for ADL and work activities.(Progressing, not met)  4. Pt to tolerate HEP to improve ROM and independence with ADL's(Progressing, not " met)     Long Term Goals: 8 weeks  1.Report decreased shoulder pain  < / =  1 /10  to increase tolerance for return to sleeping through the night(Progressing, not met)  2.Increase AROM to full motion without shoulder pain(Progressing, not met)  3.Increase strength to >/= 4/5 in cuff and scap stabilizers to increase tolerance for ADL and work activities.(Progressing, not met)  4. Pt goal: return to playing music without shoulder pain (Progressing, not met)  5. Pt will have improved gcode of CJ (20-40% limited) on FOTO shoulder in order to demonstrate true functional improvement. (Progressing, not met)    Plan     Plan of care Certification: 11/28/2023 to 3/28/2024.     Improve L scap stabilization     Corwin Merritt, PT, DPT, OCS, FAAOMPT

## 2024-01-19 ENCOUNTER — PATIENT MESSAGE (OUTPATIENT)
Dept: INTERNAL MEDICINE | Facility: CLINIC | Age: 74
End: 2024-01-19
Payer: COMMERCIAL

## 2024-01-19 RX ORDER — SELEGILINE 12 MG/24H
1 PATCH TRANSDERMAL DAILY
Qty: 30 PATCH | Refills: 0 | Status: SHIPPED | OUTPATIENT
Start: 2024-01-19 | End: 2024-02-19 | Stop reason: SDUPTHER

## 2024-01-19 NOTE — TELEPHONE ENCOUNTER
Refill Routing Note   Medication(s) are not appropriate for processing by [facility] for the following reason(s):        Outside of protocol    [facility] action(s):  Route               Appointments  past 12m or future 3m with PCP    Date Provider   Last Visit   8/28/2023 Haseeb Puentes MD   Next Visit   Visit date not found Haseeb Puentes MD   ED visits in past 90 days: 0        Note composed:9:01 AM 01/19/2024

## 2024-01-22 ENCOUNTER — PATIENT MESSAGE (OUTPATIENT)
Dept: REHABILITATION | Facility: HOSPITAL | Age: 74
End: 2024-01-22
Payer: COMMERCIAL

## 2024-01-22 ENCOUNTER — OFFICE VISIT (OUTPATIENT)
Dept: OPHTHALMOLOGY | Facility: CLINIC | Age: 74
End: 2024-01-22
Payer: COMMERCIAL

## 2024-01-22 DIAGNOSIS — H18.452 SALZMANN'S NODULAR DEGENERATION OF CORNEA OF LEFT EYE: ICD-10-CM

## 2024-01-22 DIAGNOSIS — H04.123 DRY EYE SYNDROME OF BOTH EYES: ICD-10-CM

## 2024-01-22 DIAGNOSIS — Z96.1 PSEUDOPHAKIA: ICD-10-CM

## 2024-01-22 DIAGNOSIS — H02.883 MEIBOMIAN GLAND DYSFUNCTION (MGD) OF BOTH EYES: Primary | ICD-10-CM

## 2024-01-22 DIAGNOSIS — H02.886 MEIBOMIAN GLAND DYSFUNCTION (MGD) OF BOTH EYES: Primary | ICD-10-CM

## 2024-01-22 PROCEDURE — 1125F AMNT PAIN NOTED PAIN PRSNT: CPT | Mod: CPTII,S$GLB,, | Performed by: OPHTHALMOLOGY

## 2024-01-22 PROCEDURE — 99999 PR PBB SHADOW E&M-EST. PATIENT-LVL IV: CPT | Mod: PBBFAC,,, | Performed by: OPHTHALMOLOGY

## 2024-01-22 PROCEDURE — 3288F FALL RISK ASSESSMENT DOCD: CPT | Mod: CPTII,S$GLB,, | Performed by: OPHTHALMOLOGY

## 2024-01-22 PROCEDURE — 1159F MED LIST DOCD IN RCRD: CPT | Mod: CPTII,S$GLB,, | Performed by: OPHTHALMOLOGY

## 2024-01-22 PROCEDURE — 92012 INTRM OPH EXAM EST PATIENT: CPT | Mod: S$GLB,,, | Performed by: OPHTHALMOLOGY

## 2024-01-22 PROCEDURE — 1157F ADVNC CARE PLAN IN RCRD: CPT | Mod: CPTII,S$GLB,, | Performed by: OPHTHALMOLOGY

## 2024-01-22 PROCEDURE — 1160F RVW MEDS BY RX/DR IN RCRD: CPT | Mod: CPTII,S$GLB,, | Performed by: OPHTHALMOLOGY

## 2024-01-22 PROCEDURE — 1101F PT FALLS ASSESS-DOCD LE1/YR: CPT | Mod: CPTII,S$GLB,, | Performed by: OPHTHALMOLOGY

## 2024-01-22 RX ORDER — DOXYCYCLINE 100 MG/1
100 CAPSULE ORAL EVERY 12 HOURS
Qty: 32 CAPSULE | Refills: 1 | Status: SHIPPED | OUTPATIENT
Start: 2024-01-22 | End: 2024-05-02

## 2024-01-22 NOTE — PROGRESS NOTES
Subjective:       Patient ID: Raffy Rutherford Jr. is a 73 y.o. male.    Chief Complaint: Eye Problem    HPI    DLS: 11/20/23 Dr Terrence Knight QID OD, QHS OD  Hypochlor spry daily   Warm compresses BID     Pt here for chronic eye problems.  Pt states eyelid OD is irritated and it   comes and goes throughout the day. Pt states crusting OD at times.  Pt   states eye pain OD (6 on scale) in the temporal corner of the eye and   eyelid.  Pt states he feels OD is less irritated without the spray.   Last edited by Zack Kenny MD on 1/22/2024  9:57 AM.             Assessment:       1. Meibomian gland dysfunction (MGD) of both eyes    2. Dry eye syndrome of both eyes    3. 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 Pseudophakia        Plan:       MGD OD>OS-Causing RL eyelid discomfort. Pt wants to try Doxycycline over 4 wks. Pt also wants to see Dr Larson.  SHIRA-Stable OU.  Salzmann's OS-Stable.      Start Doxycycline 100 mg po bid x 2 days, then qd x 28 days.  Trial of Ivizia lid wipes.  Refer to Dr Larson for eval per Pt request.

## 2024-01-23 ENCOUNTER — TELEPHONE (OUTPATIENT)
Dept: OPHTHALMOLOGY | Facility: CLINIC | Age: 74
End: 2024-01-23
Payer: COMMERCIAL

## 2024-01-23 ENCOUNTER — PATIENT MESSAGE (OUTPATIENT)
Dept: REHABILITATION | Facility: HOSPITAL | Age: 74
End: 2024-01-23

## 2024-01-23 ENCOUNTER — CLINICAL SUPPORT (OUTPATIENT)
Dept: REHABILITATION | Facility: HOSPITAL | Age: 74
End: 2024-01-23
Payer: COMMERCIAL

## 2024-01-23 DIAGNOSIS — M25.512 CHRONIC LEFT SHOULDER PAIN: Primary | ICD-10-CM

## 2024-01-23 DIAGNOSIS — G89.29 CHRONIC LEFT SHOULDER PAIN: Primary | ICD-10-CM

## 2024-01-23 PROCEDURE — 97140 MANUAL THERAPY 1/> REGIONS: CPT | Performed by: PHYSICAL THERAPIST

## 2024-01-23 PROCEDURE — 97530 THERAPEUTIC ACTIVITIES: CPT | Performed by: PHYSICAL THERAPIST

## 2024-01-23 PROCEDURE — 97112 NEUROMUSCULAR REEDUCATION: CPT | Performed by: PHYSICAL THERAPIST

## 2024-01-23 NOTE — PROGRESS NOTES
"[facility]   Physical Therapy Treatment Note      Name: Raffy Rutherford Jr.  Clinic Number: [id number]    Therapy Diagnosis:   Encounter Diagnosis   Name Primary?    Chronic left shoulder pain Yes     Physician: Davin Castrejon DO    Visit Date: 1/23/2024    Physician Orders: PT Eval and Treat   Medical Diagnosis from Referral: M67.922 (ICD-10-CM) - Biceps tendinopathy, left   Evaluation Date: 11/28/2023  Authorization Period Expiration: 11/27/2024  Plan of Care Expiration: 3/28/2024  Progress Note Due: 1/1/2024  Visit # / Visits authorized: 4/20  Total visits: 7  FOTO: 1/1    PTA Visit #: 0/5     Time In: 1400  Time Out: 1500  Total Billable Time: 60 minutes    Subjective     Pt reports: Shoulder has been feeling better. He reports no pain the other day    He was compliant with home exercise program.  Response to previous treatment: no change  Functional change: no changes    Pain: 5/10  Location: left shoulder      Objective      Objective Measures updated at progress report unless specified.     Treatment     Raffy received the treatments listed below:      therapeutic exercises to develop strength, endurance, ROM, flexibility, posture, and core stabilization for 0 minutes including:      manual therapy techniques: Joint mobilizations and Soft tissue Mobilization were applied to the: L shoulder for 10 minutes, including:  Gr I-II oscillations  Gr III flexion   Gr III IR/ER with centering glide      neuromuscular re-education activities to improve: Balance, Coordination, Sense, Proprioception, and Posture for 25 minutes. The following activities were included:    Serratus press 3# 2 x 10  IR/ER walkouts 2 x 10  Shoulder extensions OTB 2 x 10  Controlled arc of motion 2# 3 x 15  Row with ER GTB 2 x 15    NT  Seated ankle dorsiflexion 2 x 10  No money OTB 2 x 10 3"  Supine T OTB 2 x 10  Sidelying flexion dowel 3 x 15  Sidelying serratus dowel 3 x 15  Supine flexion OTB 2 x 10  Supine IR/ER 2# " "controlled at 45 deg abd   IR walkouts OTB 2 x 10      therapeutic activities to improve functional performance for 25 minutes, including:    AlterG walking 70% BW 15' 1.4 mph  SLR supine 2 x 10    NT  Scaptions 1# 2 x 10  Slot machines 3# 3 x 15  Row with ER OTB 2 x 10  Bridges 2 x 15  Prone extensions 2# 3 x 12  Prone row 3# 3 x 12  Sidelying clam GTB to improve gait and balance 2 x 10 5" hold      Patient Education and Home Exercises       Education provided:   - proper scap stabilization, LE strengthening for weakness    Written Home Exercises Provided: YES. Exercises were reviewed and Raffy was able to demonstrate them prior to the end of the session.  Raffy demonstrated good understanding of the education provided. See EMR under Patient Instructions for exercises provided during therapy sessions    Assessment   Less pain in the shoulder the past couple of days. Tolerated last session without increased symptoms Progressing on alterG tolerance and with motor control to the shoulder    Raffy Is progressing well towards his goals.   Pt prognosis is Excellent.     Pt will continue to benefit from skilled outpatient physical therapy to address the deficits listed in the problem list box on initial evaluation, provide pt/family education and to maximize pt's level of independence in the home and community environment.     Pt's spiritual, cultural and educational needs considered and pt agreeable to plan of care and goals.     Anticipated barriers to physical therapy: none    GOALS: Short Term Goals:  4 weeks  1.Report decreased shoulder pain < / =  3/10  to increase tolerance for return to playing music(Progressing, not met)  2. Increase PROM full motion without pain  (Progressing, not met)  3. Increased strength by 1/3 MMT grade in cuff to increase tolerance for ADL and work activities.(Progressing, not met)  4. Pt to tolerate HEP to improve ROM and independence with ADL's(Progressing, not met)     Long Term " Goals: 8 weeks  1.Report decreased shoulder pain  < / =  1 /10  to increase tolerance for return to sleeping through the night(Progressing, not met)  2.Increase AROM to full motion without shoulder pain(Progressing, not met)  3.Increase strength to >/= 4/5 in cuff and scap stabilizers to increase tolerance for ADL and work activities.(Progressing, not met)  4. Pt goal: return to playing music without shoulder pain (Progressing, not met)  5. Pt will have improved gcode of CJ (20-40% limited) on FOTO shoulder in order to demonstrate true functional improvement. (Progressing, not met)    Plan     Plan of care Certification: 11/28/2023 to 3/28/2024.     Improve L scap stabilization     Corwin Merritt, PT, DPT, OCS, FAAOMPT

## 2024-01-23 NOTE — TELEPHONE ENCOUNTER
----- Message from Faith Hardin sent at 1/22/2024 10:58 AM CST -----  Regarding: FW: Appointment    ----- Message -----  From: Yolanda Duggan MA  Sent: 1/22/2024  10:24 AM CST  To: [facility] Staff  Subject: Appointment                                      Good morning!  Dr Kenny would like pt to see Dr Larson for RLL pain/MGD if you could call pt for an appointment.  Thank you!

## 2024-01-23 NOTE — TELEPHONE ENCOUNTER
----- Message from Rick Freeman sent at 1/23/2024  3:33 PM CST -----  Contact: [phone]  Pt is calling back and asked if you could message him on the portal. He said he can do any time on may second. Please call back to further assist.

## 2024-01-26 ENCOUNTER — CLINICAL SUPPORT (OUTPATIENT)
Dept: REHABILITATION | Facility: HOSPITAL | Age: 74
End: 2024-01-26
Payer: COMMERCIAL

## 2024-01-26 DIAGNOSIS — G89.29 CHRONIC LEFT SHOULDER PAIN: Primary | ICD-10-CM

## 2024-01-26 DIAGNOSIS — M25.512 CHRONIC LEFT SHOULDER PAIN: Primary | ICD-10-CM

## 2024-01-26 PROCEDURE — 97530 THERAPEUTIC ACTIVITIES: CPT | Performed by: PHYSICAL THERAPIST

## 2024-01-26 PROCEDURE — 97140 MANUAL THERAPY 1/> REGIONS: CPT | Performed by: PHYSICAL THERAPIST

## 2024-01-26 PROCEDURE — 97112 NEUROMUSCULAR REEDUCATION: CPT | Performed by: PHYSICAL THERAPIST

## 2024-01-26 NOTE — PROGRESS NOTES
"[facility]   Physical Therapy Treatment Note      Name: Raffy Rutherford Jr.  Clinic Number: [id number]    Therapy Diagnosis:   Encounter Diagnosis   Name Primary?    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 Shoulder is feeling better     He was compliant with home exercise program.  Response to previous treatment: no change  Functional change: no changes    Pain: 5/10  Location: left shoulder      Objective      Objective Measures updated at progress report unless specified.     Treatment     Raffy received the treatments listed below:      therapeutic exercises to develop strength, endurance, ROM, flexibility, posture, and core stabilization for 0 minutes including:      manual therapy techniques: Joint mobilizations and Soft tissue Mobilization were applied to the: L shoulder for 10 minutes, including:  Gr I-II oscillations  Gr III flexion   Gr III IR/ER with centering glide      neuromuscular re-education activities to improve: Balance, Coordination, Sense, Proprioception, and Posture for 25 minutes. The following activities were included:    Serratus press 3# 2 x 10  IR/ER walkouts 2 x 10  Shoulder extensions GTB palms down  2 x 10  Serratus press OTB 2 x 10    NT  Seated ankle dorsiflexion 2 x 10  No money OTB 2 x 10 3"  Supine T OTB 2 x 10  Sidelying flexion dowel 3 x 15  Sidelying serratus dowel 3 x 15  Supine " "flexion OTB 2 x 10  Supine IR/ER 2# controlled at 45 deg abd   IR walkouts OTB 2 x 10      therapeutic activities to improve functional performance for 25 minutes, including:    AlterG walking 70% BW 15' 1.4 mph  SLR supine 2 x 10    NT  Scaptions 1# 2 x 10  Slot machines 3# 3 x 15  Row with ER OTB 2 x 10  Bridges 2 x 15  Prone extensions 2# 3 x 12  Prone row 3# 3 x 12  Sidelying clam GTB to improve gait and balance 2 x 10 5" hold      Patient Education and Home Exercises       Education provided:   - proper scap stabilization, LE strengthening for weakness    Written Home Exercises Provided: YES. Exercises were reviewed and Raffy was able to demonstrate them prior to the end of the session.  Raffy demonstrated good understanding of the education provided. See EMR under Patient Instructions for exercises provided during therapy sessions    Assessment     Progressing with his shoulder stabilization. He notes decreased pain on the L over the biceps tendon and with passive ROM to the LUE. Will continue quad sets, neeeds to improve quad set with heel lift.     Raffy Is progressing well towards his goals.   Pt prognosis is Excellent.     Pt will continue to benefit from skilled outpatient physical therapy to address the deficits listed in the problem list box on initial evaluation, provide pt/family education and to maximize pt's level of independence in the home and community environment.     Pt's spiritual, cultural and educational needs considered and pt agreeable to plan of care and goals.     Anticipated barriers to physical therapy: none    GOALS: Short Term Goals:  4 weeks  1.Report decreased shoulder pain < / =  3/10  to increase tolerance for return to playing music(Progressing, not met)  2. Increase PROM full motion without pain  (Progressing, not met)  3. Increased strength by 1/3 MMT grade in cuff to increase tolerance for ADL and work activities.(Progressing, not met)  4. Pt to tolerate HEP to improve ROM " and independence with ADL's(Progressing, not met)     Long Term Goals: 8 weeks  1.Report decreased shoulder pain  < / =  1 /10  to increase tolerance for return to sleeping through the night(Progressing, not met)  2.Increase AROM to full motion without shoulder pain(Progressing, not met)  3.Increase strength to >/= 4/5 in cuff and scap stabilizers to increase tolerance for ADL and work activities.(Progressing, not met)  4. Pt goal: return to playing music without shoulder pain (Progressing, not met)  5. Pt will have improved gcode of CJ (20-40% limited) on FOTO shoulder in order to demonstrate true functional improvement. (Progressing, not met)    Plan     Plan of care Certification: 11/28/2023 to 3/28/2024.     Improve L scap stabilization     Corwin Merritt, PT, DPT, OCS, FAAOMPT

## 2024-01-29 ENCOUNTER — PATIENT MESSAGE (OUTPATIENT)
Dept: INTERNAL MEDICINE | Facility: CLINIC | Age: 74
End: 2024-01-29
Payer: COMMERCIAL

## 2024-01-29 ENCOUNTER — CLINICAL SUPPORT (OUTPATIENT)
Dept: REHABILITATION | Facility: HOSPITAL | Age: 74
End: 2024-01-29
Payer: COMMERCIAL

## 2024-01-29 ENCOUNTER — LAB VISIT (OUTPATIENT)
Dept: LAB | Facility: HOSPITAL | Age: 74
End: 2024-01-29
Attending: RADIOLOGY
Payer: COMMERCIAL

## 2024-01-29 DIAGNOSIS — C61 PROSTATE CANCER: ICD-10-CM

## 2024-01-29 DIAGNOSIS — Z98.1 S/P LUMBAR SPINAL FUSION: ICD-10-CM

## 2024-01-29 DIAGNOSIS — G89.29 CHRONIC LEFT SHOULDER PAIN: Primary | ICD-10-CM

## 2024-01-29 DIAGNOSIS — M62.81 MUSCLE WEAKNESS: ICD-10-CM

## 2024-01-29 DIAGNOSIS — M25.512 CHRONIC LEFT SHOULDER PAIN: Primary | ICD-10-CM

## 2024-01-29 DIAGNOSIS — E78.5 HYPERLIPIDEMIA, UNSPECIFIED HYPERLIPIDEMIA TYPE: ICD-10-CM

## 2024-01-29 DIAGNOSIS — M47.819 SPONDYLOSIS WITHOUT MYELOPATHY: Primary | ICD-10-CM

## 2024-01-29 LAB
CHOLEST SERPL-MCNC: 188 MG/DL (ref 120–199)
CHOLEST/HDLC SERPL: 3.2 {RATIO} (ref 2–5)
COMPLEXED PSA SERPL-MCNC: 9.3 NG/ML (ref 0–4)
HDLC SERPL-MCNC: 59 MG/DL (ref 40–75)
HDLC SERPL: 31.4 % (ref 20–50)
LDLC SERPL CALC-MCNC: 78.4 MG/DL (ref 63–159)
NONHDLC SERPL-MCNC: 129 MG/DL
TRIGL SERPL-MCNC: 253 MG/DL (ref 30–150)

## 2024-01-29 PROCEDURE — 97140 MANUAL THERAPY 1/> REGIONS: CPT | Performed by: PHYSICAL THERAPIST

## 2024-01-29 PROCEDURE — 97112 NEUROMUSCULAR REEDUCATION: CPT | Performed by: PHYSICAL THERAPIST

## 2024-01-29 PROCEDURE — 97530 THERAPEUTIC ACTIVITIES: CPT | Performed by: PHYSICAL THERAPIST

## 2024-01-29 PROCEDURE — 84153 ASSAY OF PSA TOTAL: CPT | Performed by: RADIOLOGY

## 2024-01-29 PROCEDURE — 36415 COLL VENOUS BLD VENIPUNCTURE: CPT | Performed by: RADIOLOGY

## 2024-01-29 PROCEDURE — 80061 LIPID PANEL: CPT | Performed by: INTERNAL MEDICINE

## 2024-01-29 RX ORDER — SELEGILINE 12 MG/24H
1 PATCH TRANSDERMAL DAILY
Qty: 30 PATCH | Refills: 0 | OUTPATIENT
Start: 2024-01-29 | End: 2025-01-28

## 2024-01-29 NOTE — PROGRESS NOTES
"[facility]   Physical Therapy Treatment Note      Name: Raffy Rutherford Jr.  Clinic Number: [id number]    Therapy Diagnosis:   Encounter Diagnosis   Name Primary?    Chronic left shoulder pain Yes     Physician: Davin Castrejon DO    Visit Date: 1/29/2024    Physician Orders: PT Eval and Treat   Medical Diagnosis from Referral: M67.922 (ICD-10-CM) - Biceps tendinopathy, left   Evaluation Date: 11/28/2023  Authorization Period Expiration: 11/27/2024  Plan of Care Expiration: 3/28/2024  Progress Note Due: 1/1/2024  Visit # / Visits authorized: 6/20  Total visits: 7  FOTO: 1/1    PTA Visit #: 0/5     Time In: 1003  Time Out: 1113  Total Billable Time: 70 minutes    Subjective     Pt reports: I played some this weekend. Sore in the shoulder but I am better about not picking up my gand daughter    He was compliant with home exercise program.  Response to previous treatment: no change  Functional change: no changes    Pain: 5/10  Location: left shoulder      Objective      Objective Measures updated at progress report unless specified.     Treatment     Raffy received the treatments listed below:      therapeutic exercises to develop strength, endurance, ROM, flexibility, posture, and core stabilization for 0 minutes including:      manual therapy techniques: Joint mobilizations and Soft tissue Mobilization were applied to the: L shoulder for 10 minutes, including:  Gr I-II oscillations  Gr III flexion   Gr III IR/ER with centering glide      neuromuscular re-education activities to improve: Balance, Coordination, Sense, Proprioception, and Posture for 35 minutes. The following activities were included:    Serratus press 3# 2 x 10  IR GTB conc/ecc2 x 10  Shoulder extensions GTB palms down  2 x 10  Sidelying ER 1# 3 x 10  Supine controlled arc of motion 3 x 10 3#    NT  Seated ankle dorsiflexion 2 x 10  No money OTB 2 x 10 3"  Supine T OTB 2 x 10  Sidelying flexion dowel 3 x 15  Sidelying serratus " "dowel 3 x 15  Supine flexion OTB 2 x 10  Supine IR/ER 2# controlled at 45 deg abd   IR walkouts OTB 2 x 10      therapeutic activities to improve functional performance for 25 minutes, including:    AlterG walking 70% BW 15' 1.4 mph  SLR supine 2 x 10  Scaptions 1# 2 x 10  Slot machines 4# 3 x 15    NT    Row with ER OTB 2 x 10  Bridges 2 x 15  Prone extensions 2# 3 x 12  Prone row 3# 3 x 12  Sidelying clam GTB to improve gait and balance 2 x 10 5" hold      Patient Education and Home Exercises       Education provided:   - proper scap stabilization, LE strengthening for weakness    Written Home Exercises Provided: YES. Exercises were reviewed and Raffy was able to demonstrate them prior to the end of the session.  Raffy demonstrated good understanding of the education provided. See EMR under Patient Instructions for exercises provided during therapy sessions    Assessment     Progressed functional scaption and reach today with 1# He spencer increase slot machine and with conc control with subscap for IR. He has improved cuff strength pain free on arrival. Balance a limiting factor with lifting weights outside his ANG[place]ES    Raffy Is progressing well towards his goals.   Pt prognosis is Excellent.     Pt will continue to benefit from skilled outpatient physical therapy to address the deficits listed in the problem list box on initial evaluation, provide pt/family education and to maximize pt's level of independence in the home and community environment.     Pt's spiritual, cultural and educational needs considered and pt agreeable to plan of care and goals.     Anticipated barriers to physical therapy: none    GOALS: Short Term Goals:  4 weeks  1.Report decreased shoulder pain < / =  3/10  to increase tolerance for return to playing music(Progressing, not met)  2. Increase PROM full motion without pain  (Progressing, not met)  3. Increased strength by 1/3 MMT grade in cuff to increase tolerance for ADL and work " activities.(Progressing, not met)  4. Pt to tolerate HEP to improve ROM and independence with ADL's(Progressing, not met)     Long Term Goals: 8 weeks  1.Report decreased shoulder pain  < / =  1 /10  to increase tolerance for return to sleeping through the night(Progressing, not met)  2.Increase AROM to full motion without shoulder pain(Progressing, not met)  3.Increase strength to >/= 4/5 in cuff and scap stabilizers to increase tolerance for ADL and work activities.(Progressing, not met)  4. Pt goal: return to playing music without shoulder pain (Progressing, not met)  5. Pt will have improved gcode of CJ (20-40% limited) on FOTO shoulder in order to demonstrate true functional improvement. (Progressing, not met)    Plan     Plan of care Certification: 11/28/2023 to 3/28/2024.     Improve L scap stabilization     Corwin Merritt, PT, DPT, OCS, FAAOMPT

## 2024-01-31 ENCOUNTER — TELEPHONE (OUTPATIENT)
Dept: NEUROLOGY | Facility: CLINIC | Age: 74
End: 2024-01-31
Payer: COMMERCIAL

## 2024-01-31 ENCOUNTER — PATIENT MESSAGE (OUTPATIENT)
Dept: REHABILITATION | Facility: HOSPITAL | Age: 74
End: 2024-01-31
Payer: COMMERCIAL

## 2024-02-01 ENCOUNTER — PATIENT MESSAGE (OUTPATIENT)
Dept: NEUROLOGY | Facility: CLINIC | Age: 74
End: 2024-02-01
Payer: COMMERCIAL

## 2024-02-01 ENCOUNTER — OFFICE VISIT (OUTPATIENT)
Dept: RADIATION ONCOLOGY | Facility: CLINIC | Age: 74
End: 2024-02-01
Payer: COMMERCIAL

## 2024-02-01 ENCOUNTER — PATIENT MESSAGE (OUTPATIENT)
Dept: NEUROLOGY | Facility: CLINIC | Age: 74
End: 2024-02-01

## 2024-02-01 DIAGNOSIS — C61 PROSTATE CANCER: Primary | ICD-10-CM

## 2024-02-01 PROCEDURE — 1160F RVW MEDS BY RX/DR IN RCRD: CPT | Mod: CPTII,95,, | Performed by: RADIOLOGY

## 2024-02-01 PROCEDURE — 99212 OFFICE O/P EST SF 10 MIN: CPT | Mod: 95,,, | Performed by: RADIOLOGY

## 2024-02-01 PROCEDURE — 1157F ADVNC CARE PLAN IN RCRD: CPT | Mod: CPTII,95,, | Performed by: RADIOLOGY

## 2024-02-01 PROCEDURE — 1159F MED LIST DOCD IN RCRD: CPT | Mod: CPTII,95,, | Performed by: RADIOLOGY

## 2024-02-01 RX ORDER — TAMSULOSIN HYDROCHLORIDE 0.4 MG/1
0.4 CAPSULE ORAL DAILY
Qty: 30 CAPSULE | Refills: 6 | Status: SHIPPED | OUTPATIENT
Start: 2024-02-01 | End: 2024-02-27

## 2024-02-01 NOTE — TELEPHONE ENCOUNTER
"Called pt to address msgs sent today and need to reschedule botox due to pending insurance approval. Explained to pt the need to reschedule, his option to pay out of pocket, and clinic procedures.   Pt states he wishes to be communicated only through mychart messaging and not through emails, calls, texts as is automatically occuring. I discussed w/ him that there may be times in which he is called, and times in which he is sent mychart msgs, emails, etc. He may change these setting in his mychart if he prefers. However, our clinic will call/msg based on what is most appropriate at the time. Advised he reach out to clinic via [employer] 1-3 days before visit, during business hours, to inquire if botox is approved or if he needs to reschedule. This will avoid these miscommunications as he is unable to answer the phone and prefers to respond to mychart messaging when most convenient in his schedule.   Pt states verbally that he does not wish for us to communicate with him at all. He wishes for us to schedule his next botox without discussing times/dates with him. "I will see it on my mychart, I dont need you to call me about it." He also wishes to see Kadie in f/up to discuss his treatment plan. Again, verblizes he does not want to be contacted w/ dates/times and options. He wishes for staff to "just schedule me" without communicating and he'll "just see it on my mychart" because "I'll make any time or date work."  Pt verbalizes satisfaction w/ plan and has no further questions/concerns.   "

## 2024-02-02 ENCOUNTER — PATIENT MESSAGE (OUTPATIENT)
Dept: OPHTHALMOLOGY | Facility: CLINIC | Age: 74
End: 2024-02-02
Payer: COMMERCIAL

## 2024-02-03 ENCOUNTER — PATIENT MESSAGE (OUTPATIENT)
Dept: SPORTS MEDICINE | Facility: CLINIC | Age: 74
End: 2024-02-03
Payer: COMMERCIAL

## 2024-02-05 NOTE — PROGRESS NOTES
Spoke c pt. R/s pt's 03/05/24 appt due to change in his travel plans. Confirmed appt date, time, location. Pt will call c additional questions/concerns in interim. Pt expressed understanding & was thankful.

## 2024-02-05 NOTE — PROGRESS NOTES
The patient location is: home   The chief complaint leading to consultation is: prostate cancer     Visit type: audiovisual    Face to Face time with patient: 20 minutes   30 minutes of total time spent on the encounter, which includes face to face time and non-face to face time preparing to see the patient (eg, review of tests), Obtaining and/or reviewing separately obtained history, Documenting clinical information in the electronic or other health record, Independently interpreting results (not separately reported) and communicating results to the patient/family/caregiver, or Care coordination (not separately reported).     Each patient to whom he or she provides medical services by telemedicine is:  (1) informed of the relationship between the physician and patient and the respective role of any other health care provider with respect to management of the patient; and (2) notified that he or she may decline to receive medical services by telemedicine and may withdraw from such care at any time.    [facility] Radiation On[facility]      Patient ID: Raffy Rutherford Jr. is a 73 y.o. male.    Chief Complaint: No chief complaint on file.    Mr. Rutherford has a history clinical stage IIB (T1c, N0, M0, PSA < 10, GG2) prostate cancer.  He presented for further evaluation after repeat PSA on 5/25/21 returned at 8.2 ng/ml.  MRI on 5/25/21 revealed a 46.7 cc prostate with a 1.3 cm T2 hypointense lesion in the Rt. mid gland, PI-RADS 4.  There was no extraprostatic extension.  The seminal vesicles and neurovascular bundles were unremarkable.  Biopsies on 6/15/21 revealed Daniel 7 (3+4) adenocarcinoma involving 29% of 4 of 6 cores from the targeted lesion in the Rt. apex.  The Whiteface pattern 4 accounted for 10 - 20% of the tumor.  There was a small focus of atypical glands at the Lt. apex but the remaining biopsies revealed benign prostatic tissue.  Polaris recurrence score returned at 3.2 which is at the  borderline of active surveillance and single modality treatment.   He has continued with active surveillance.  Today the patient states he feels well.        Review of Systems   Constitutional:  Negative for activity change, appetite change and chills.   Gastrointestinal:  Negative for constipation, diarrhea and fecal incontinence.   Genitourinary:  Negative for bladder incontinence, difficulty urinating, dysuria, frequency and hematuria.     Physical Exam  Constitutional:       General: He is not in acute distress.     Appearance: Normal appearance.   Neurological:      Mental Status: He is alert and oriented to person, place, and time.   Psychiatric:         Mood and Affect: Mood normal.         Judgment: Judgment normal.        Latest Reference Range & Units 04/24/23 14:34 08/07/23 09:39 08/25/23 11:35 01/29/24 14:20   PSA Diagnostic 0.00 - 4.00 ng/mL 10.1 (H) 10.7 (H) 11.1 (H) 9.3 (H)   (H): Data is abnormally high       Assessment and Plan    Prostate cancer - no evidence of tumor progression.  PSA decreased  MRI in August of 2023 was unchanged.  Plan follow up in 4 months with PSA.

## 2024-02-06 ENCOUNTER — CLINICAL SUPPORT (OUTPATIENT)
Dept: REHABILITATION | Facility: HOSPITAL | Age: 74
End: 2024-02-06
Payer: COMMERCIAL

## 2024-02-06 DIAGNOSIS — G89.29 CHRONIC LEFT SHOULDER PAIN: Primary | ICD-10-CM

## 2024-02-06 DIAGNOSIS — M25.512 CHRONIC LEFT SHOULDER PAIN: Primary | ICD-10-CM

## 2024-02-06 PROCEDURE — 97112 NEUROMUSCULAR REEDUCATION: CPT | Performed by: PHYSICAL THERAPIST

## 2024-02-06 PROCEDURE — 97140 MANUAL THERAPY 1/> REGIONS: CPT | Performed by: PHYSICAL THERAPIST

## 2024-02-06 PROCEDURE — 97530 THERAPEUTIC ACTIVITIES: CPT | Performed by: PHYSICAL THERAPIST

## 2024-02-06 NOTE — PROGRESS NOTES
"[facility]   Physical Therapy Treatment Note      Name: Raffy Rutherford Jr.  Clinic Number: [id number]    Therapy Diagnosis:   Encounter Diagnosis   Name Primary?    Chronic left shoulder pain Yes     Physician: Davin Castrejon DO    Visit Date: 2/6/2024    Physician Orders: PT Eval and Treat   Medical Diagnosis from Referral: M67.922 (ICD-10-CM) - Biceps tendinopathy, left   Evaluation Date: 11/28/2023  Authorization Period Expiration: 11/27/2024  Plan of Care Expiration: 3/28/2024  Progress Note Due: 1/1/2024  Visit # / Visits authorized: 7/20  Total visits: 7  FOTO: 1/1    PTA Visit #: 0/5     Time In: 1405  Time Out: 1500  Total Billable Time: 55 minutes    Subjective     Pt reports: I played on Saturday and had 2 days of soreness so I took a couple days off     He was compliant with home exercise program.  Response to previous treatment: no change  Functional change: no changes    Pain: 5/10  Location: left shoulder      Objective      Objective Measures updated at progress report unless specified.     Treatment     Raffy received the treatments listed below:      therapeutic exercises to develop strength, endurance, ROM, flexibility, posture, and core stabilization for 0 minutes including:      manual therapy techniques: Joint mobilizations and Soft tissue Mobilization were applied to the: L shoulder for 10 minutes, including:  Gr I-II oscillations  Gr III flexion   Gr III IR/ER with centering glide      neuromuscular re-education activities to improve: Balance, Coordination, Sense, Proprioception, and Posture for 35 minutes. The following activities were included:    Serratus press 3# 2 x 10  Seated ER OTB 2 x 15  -super set LAQ 2#   Sidelying hip abd 2 x 15  -sidelying ER 2 x 15    NT  Seated ankle dorsiflexion 2 x 10  No money OTB 2 x 10 3"  Supine T OTB 2 x 10  Sidelying flexion dowel 3 x 15  Sidelying serratus dowel 3 x 15  Supine flexion OTB 2 x 10  Supine IR/ER 2# controlled at 45 " "deg abd   IR walkouts OTB 2 x 10      therapeutic activities to improve functional performance for 10 minutes, including:    SLR supine 2 x 10    NT  Scaptions 1# 2 x 10  Slot machines 4# 3 x 15  AlterG walking 70% BW 15' 1.4 mph  Row with ER OTB 2 x 10  Bridges 2 x 15  Prone extensions 2# 3 x 12  Prone row 3# 3 x 12  Sidelying clam GTB to improve gait and balance 2 x 10 5" hold      Patient Education and Home Exercises       Education provided:   - proper scap stabilization, LE strengthening for weakness    Written Home Exercises Provided: YES. Exercises were reviewed and Raffy was able to demonstrate them prior to the end of the session.  Raffy demonstrated good understanding of the education provided. See EMR under Patient Instructions for exercises provided during therapy sessions    Assessment     Progressed strengthening as tolerated, super set to the LE and UE. Will progress with circuit training to the shoulder and progress with strengthening to cuff    Raffy Is progressing well towards his goals.   Pt prognosis is Excellent.     Pt will continue to benefit from skilled outpatient physical therapy to address the deficits listed in the problem list box on initial evaluation, provide pt/family education and to maximize pt's level of independence in the home and community environment.     Pt's spiritual, cultural and educational needs considered and pt agreeable to plan of care and goals.     Anticipated barriers to physical therapy: none    GOALS: Short Term Goals:  4 weeks  1.Report decreased shoulder pain < / =  3/10  to increase tolerance for return to playing music(Progressing, not met)  2. Increase PROM full motion without pain  (Progressing, not met)  3. Increased strength by 1/3 MMT grade in cuff to increase tolerance for ADL and work activities.(Progressing, not met)  4. Pt to tolerate HEP to improve ROM and independence with ADL's(Progressing, not met)     Long Term Goals: 8 weeks  1.Report " decreased shoulder pain  < / =  1 /10  to increase tolerance for return to sleeping through the night(Progressing, not met)  2.Increase AROM to full motion without shoulder pain(Progressing, not met)  3.Increase strength to >/= 4/5 in cuff and scap stabilizers to increase tolerance for ADL and work activities.(Progressing, not met)  4. Pt goal: return to playing music without shoulder pain (Progressing, not met)  5. Pt will have improved gcode of CJ (20-40% limited) on FOTO shoulder in order to demonstrate true functional improvement. (Progressing, not met)    Plan     Plan of care Certification: 11/28/2023 to 3/28/2024.     Improve L scap stabilization     Corwin Merritt, PT, DPT, OCS, FAAOMPT

## 2024-02-07 ENCOUNTER — PATIENT OUTREACH (OUTPATIENT)
Dept: OTHER | Facility: OTHER | Age: 74
End: 2024-02-07
Payer: COMMERCIAL

## 2024-02-07 ENCOUNTER — PATIENT MESSAGE (OUTPATIENT)
Dept: REHABILITATION | Facility: HOSPITAL | Age: 74
End: 2024-02-07
Payer: COMMERCIAL

## 2024-02-08 ENCOUNTER — TELEPHONE (OUTPATIENT)
Dept: NEUROLOGY | Facility: CLINIC | Age: 74
End: 2024-02-08
Payer: COMMERCIAL

## 2024-02-08 ENCOUNTER — PATIENT MESSAGE (OUTPATIENT)
Dept: ADMINISTRATIVE | Facility: OTHER | Age: 74
End: 2024-02-08
Payer: COMMERCIAL

## 2024-02-08 ENCOUNTER — CLINICAL SUPPORT (OUTPATIENT)
Dept: REHABILITATION | Facility: HOSPITAL | Age: 74
End: 2024-02-08
Payer: COMMERCIAL

## 2024-02-08 ENCOUNTER — PATIENT MESSAGE (OUTPATIENT)
Dept: NEUROLOGY | Facility: CLINIC | Age: 74
End: 2024-02-08
Payer: COMMERCIAL

## 2024-02-08 DIAGNOSIS — G89.29 CHRONIC LEFT SHOULDER PAIN: Primary | ICD-10-CM

## 2024-02-08 DIAGNOSIS — M25.512 CHRONIC LEFT SHOULDER PAIN: Primary | ICD-10-CM

## 2024-02-08 PROCEDURE — 97140 MANUAL THERAPY 1/> REGIONS: CPT | Performed by: PHYSICAL THERAPIST

## 2024-02-08 PROCEDURE — 97530 THERAPEUTIC ACTIVITIES: CPT | Performed by: PHYSICAL THERAPIST

## 2024-02-08 PROCEDURE — 97112 NEUROMUSCULAR REEDUCATION: CPT | Performed by: PHYSICAL THERAPIST

## 2024-02-08 RX ORDER — SELEGILINE 12 MG/24H
1 PATCH TRANSDERMAL DAILY
Qty: 30 PATCH | Refills: 0 | Status: CANCELLED | OUTPATIENT
Start: 2024-02-08 | End: 2025-02-07

## 2024-02-08 NOTE — PROGRESS NOTES
"[facility]   Physical Therapy Treatment Note      Name: Raffy Rutherford Jr.  Clinic Number: [id number]    Therapy Diagnosis:   Encounter Diagnosis   Name Primary?    Chronic left shoulder pain Yes     Physician: Davin Castrejon DO    Visit Date: 2/8/2024    Physician Orders: PT Eval and Treat   Medical Diagnosis from Referral: M67.922 (ICD-10-CM) - Biceps tendinopathy, left   Evaluation Date: 11/28/2023  Authorization Period Expiration: 11/27/2024  Plan of Care Expiration: 3/28/2024  Progress Note Due: 1/1/2024  Visit # / Visits authorized: 8/20  Total visits: 7  FOTO: 1/1    PTA Visit #: 0/5     Time In: 1400  Time Out: 1500  Total Billable Time: 55 minutes    Subjective     Pt reports: Got the new exercises, shoulder felt pretty good after last time     He was compliant with home exercise program.  Response to previous treatment: no change  Functional change: no changes    Pain: 5/10  Location: left shoulder      Objective      Objective Measures updated at progress report unless specified.     Treatment     Raffy received the treatments listed below:      therapeutic exercises to develop strength, endurance, ROM, flexibility, posture, and core stabilization for 0 minutes including:      manual therapy techniques: Joint mobilizations and Soft tissue Mobilization were applied to the: L shoulder for 10 minutes, including:  Gr I-II oscillations  Gr III flexion   Gr III IR/ER with centering glide      neuromuscular re-education activities to improve: Balance, Coordination, Sense, Proprioception, and Posture for 35 minutes. The following activities were included:    Serratus press 3# 2 x 10  Wand press 3# 3 x 10  SLR 3 x 10  Sidelying ER 1# 3 x 15  Reverse SLR BTB - video education  LAQ 2.5# 3 x 15    NT  Seated ER OTB 2 x 15  -super set LAQ 2#   Sidelying hip abd 2 x 15  -sidelying ER 2 x 15  Seated ankle dorsiflexion 2 x 10  No money OTB 2 x 10 3"  Supine T OTB 2 x 10  Sidelying flexion dowel 3 " "x 15  Sidelying serratus dowel 3 x 15  Supine flexion OTB 2 x 10  Supine IR/ER 2# controlled at 45 deg abd   IR walkouts OTB 2 x 10      therapeutic activities to improve functional performance for 15 minutes, including:    AlterG walking 70% BW 15' 1.4 mph    NT  SLR supine 2 x 10  Scaptions 1# 2 x 10  Slot machines 4# 3 x 15  Row with ER OTB 2 x 10  Bridges 2 x 15  Prone extensions 2# 3 x 12  Prone row 3# 3 x 12  Sidelying clam GTB to improve gait and balance 2 x 10 5" hold      Patient Education and Home Exercises       Education provided:   - proper scap stabilization, LE strengthening for weakness    Written Home Exercises Provided: YES. Exercises were reviewed and Raffy was able to demonstrate them prior to the end of the session.  Raffy demonstrated good understanding of the education provided. See EMR under Patient Instructions for exercises provided during therapy sessions    Assessment     Educated patient on reverse SLR with band for hamstring strengthening and he will buy ankle weights for part of HEP. Progressing with loading to the UE/LE as tolerated, to begin connected back for chronic back stiffness    Raffy Is progressing well towards his goals.   Pt prognosis is Excellent.     Pt will continue to benefit from skilled outpatient physical therapy to address the deficits listed in the problem list box on initial evaluation, provide pt/family education and to maximize pt's level of independence in the home and community environment.     Pt's spiritual, cultural and educational needs considered and pt agreeable to plan of care and goals.     Anticipated barriers to physical therapy: none    GOALS: Short Term Goals:  4 weeks  1.Report decreased shoulder pain < / =  3/10  to increase tolerance for return to playing music(Progressing, not met)  2. Increase PROM full motion without pain  (Progressing, not met)  3. Increased strength by 1/3 MMT grade in cuff to increase tolerance for ADL and work " activities.(Progressing, not met)  4. Pt to tolerate HEP to improve ROM and independence with ADL's(Progressing, not met)     Long Term Goals: 8 weeks  1.Report decreased shoulder pain  < / =  1 /10  to increase tolerance for return to sleeping through the night(Progressing, not met)  2.Increase AROM to full motion without shoulder pain(Progressing, not met)  3.Increase strength to >/= 4/5 in cuff and scap stabilizers to increase tolerance for ADL and work activities.(Progressing, not met)  4. Pt goal: return to playing music without shoulder pain (Progressing, not met)  5. Pt will have improved gcode of CJ (20-40% limited) on FOTO shoulder in order to demonstrate true functional improvement. (Progressing, not met)    Plan     Plan of care Certification: 11/28/2023 to 3/28/2024.     Improve L scap stabilization     Corwin Merritt, PT, DPT, OCS, FAAOMPT

## 2024-02-08 NOTE — TELEPHONE ENCOUNTER
Called pt spouse; pt spouse     Called pt spouse as she sent in a duplicate refill request; rx had already been filled  Pt spouse confirmed it was a mistake and to ignore the duplicate

## 2024-02-08 NOTE — TELEPHONE ENCOUNTER
"Pt was scheduled for botox today pending insurance approval. However, due to denial, pt was rescheduled to next week. Per pt's wishes expressed in phone call on 2/1/24, when pt expressly verbalized he did not wish to be communicated in any way including calls, that he wished for us to just place an appt on his mychart. Therefore, we honored these wishes and rescheduled only by appts on his mychart.   Pt presented to clinic today despite no appt today wishing to speak w/ provider. Accommodated pt by speaking w/ him in private clinic room. Pt is verbally frustrated stating he is upset his appt was cancelled and rescheduled w/o any communication with him. I attempted to communicate that clinic was honoring his wishes that he verbalized to me last week. That he stated he wished for us to "just schedule me" and "I'll just see it on my mychart", "I dont need you to call me about it". Pt states he recorded this phone call and is an . States he wishes to be messaged about changes or updates on the mychart system. States he does not want to be called bc he has a busy schedule with his patients and cannot answer calls. He asked what the clinic is doing to obtain insurance approval, discussed and explained insurance approval process w/ him again. Pt states he will call his insurance himself. He also states "I do not want to see you again, I'll see Kadie when she gets back".  He verbalizes this multiple times. Pt asked when this will be, I told him sometime in march. Therefore, will msg pt w/ dates/times to see Kadie upon her return in March pending botox approval. Pt states he is going on vacation in march, on march 7th, wishes for this to be communicated to scheduling team, will do so.   "

## 2024-02-09 ENCOUNTER — PATIENT OUTREACH (OUTPATIENT)
Dept: OTHER | Facility: OTHER | Age: 74
End: 2024-02-09
Payer: COMMERCIAL

## 2024-02-09 NOTE — PROGRESS NOTES
Connected Back: Patient Outreach    Patient cleared by clinical team to participate in CB program.     Patient Outreach from 2/9/2024 in [employer]     2/9/2024    0927       Switch patient path to kneeling program No, this patient can perform kneeling movments.   After clinical review, please document if patient is eligible or ineligible for program. Eligible

## 2024-02-12 ENCOUNTER — TELEPHONE (OUTPATIENT)
Dept: RHEUMATOLOGY | Facility: CLINIC | Age: 74
End: 2024-02-12
Payer: COMMERCIAL

## 2024-02-12 NOTE — TELEPHONE ENCOUNTER
[facility]    Nataliia I haven't seen this patient since 2015 and he saw Dr. Alonzo here most recently 2022. He does have osteoporosis and had been on alendronate but no longer. He is way overdue for DXA. Please ask him if will follow with Dr. Alonzo or myself for his osteoporosis. If me, please schedule DXA and then appt. Thank you DIAMOND

## 2024-02-14 ENCOUNTER — CLINICAL SUPPORT (OUTPATIENT)
Dept: REHABILITATION | Facility: HOSPITAL | Age: 74
End: 2024-02-14
Payer: COMMERCIAL

## 2024-02-14 DIAGNOSIS — M25.512 CHRONIC LEFT SHOULDER PAIN: Primary | ICD-10-CM

## 2024-02-14 DIAGNOSIS — G89.29 CHRONIC LEFT SHOULDER PAIN: Primary | ICD-10-CM

## 2024-02-14 PROCEDURE — 97112 NEUROMUSCULAR REEDUCATION: CPT | Performed by: PHYSICAL THERAPIST

## 2024-02-14 PROCEDURE — 97140 MANUAL THERAPY 1/> REGIONS: CPT | Performed by: PHYSICAL THERAPIST

## 2024-02-14 PROCEDURE — 97530 THERAPEUTIC ACTIVITIES: CPT | Performed by: PHYSICAL THERAPIST

## 2024-02-14 NOTE — PROGRESS NOTES
"[facility]   Physical Therapy Treatment Note      Name: Raffy Rutherford Jr.  Clinic Number: [id number]    Therapy Diagnosis:   Encounter Diagnosis   Name Primary?    Chronic left shoulder pain Yes     Physician: Davin Castrejon DO    Visit Date: 2/14/2024    Physician Orders: PT Eval and Treat   Medical Diagnosis from Referral: M67.922 (ICD-10-CM) - Biceps tendinopathy, left   Evaluation Date: 11/28/2023  Authorization Period Expiration: 11/27/2024  Plan of Care Expiration: 3/28/2024  Progress Note Due: 1/1/2024  Visit # / Visits authorized: 9/20  Total visits: 7  FOTO: 1/1    PTA Visit #: 0/5     Time In: 900  Time Out: 1000  Total Billable Time: 60 minutes    Subjective     Pt reports: Shoulder sore after playing Monday but it has mostly been lower back pain since walking in my new braces    He was compliant with home exercise program.  Response to previous treatment: no change  Functional change: no changes    Pain: 5/10  Location: left shoulder      Objective      Objective Measures updated at progress report unless specified.     Treatment     Raffy received the treatments listed below:      therapeutic exercises to develop strength, endurance, ROM, flexibility, posture, and core stabilization for 0 minutes including:      manual therapy techniques: Joint mobilizations and Soft tissue Mobilization were applied to the: L shoulder for 10 minutes, including:  Gr I-II oscillations  Gr III flexion   Gr III IR/ER with centering glide      neuromuscular re-education activities to improve: Balance, Coordination, Sense, Proprioception, and Posture for 35 minutes. The following activities were included:    Supine IR/ER 2# controlled at 45 deg abd   Serratus press 3# 2 x 10  SLR 3 x 10  Sidelying ER 1# 3 x 15  LAQ 2.5# 3 x 15    NT  Seated ER OTB 2 x 15  -super set LAQ 2#   Sidelying hip abd 2 x 15  -sidelying ER 2 x 15  Seated ankle dorsiflexion 2 x 10  No money OTB 2 x 10 3"  Supine T OTB 2 x " "10  Sidelying flexion dowel 3 x 15  Sidelying serratus dowel 3 x 15  Supine flexion OTB 2 x 10  IR walkouts OTB 2 x 10      therapeutic activities to improve functional performance for 15 minutes, including:    AlterG walking 70% BW 15' 1.4 mph    NT  SLR supine 2 x 10  Scaptions 1# 2 x 10  Slot machines 4# 3 x 15  Row with ER OTB 2 x 10  Bridges 2 x 15  Prone extensions 2# 3 x 12  Prone row 3# 3 x 12  Sidelying clam GTB to improve gait and balance 2 x 10 5" hold      Patient Education and Home Exercises       Education provided:   - proper scap stabilization, LE strengthening for weakness    Written Home Exercises Provided: YES. Exercises were reviewed and Raffy was able to demonstrate them prior to the end of the session.  Raffy demonstrated good understanding of the education provided. See EMR under Patient Instructions for exercises provided during therapy sessions    Assessment     Progressed with cuff stabilization in supine through ROM in the shoulder. Better serratus control in supine with weight today. Back pain slowly going away but was bad over the weekend    Raffy Is progressing well towards his goals.   Pt prognosis is Excellent.     Pt will continue to benefit from skilled outpatient physical therapy to address the deficits listed in the problem list box on initial evaluation, provide pt/family education and to maximize pt's level of independence in the home and community environment.     Pt's spiritual, cultural and educational needs considered and pt agreeable to plan of care and goals.     Anticipated barriers to physical therapy: none    GOALS: Short Term Goals:  4 weeks  1.Report decreased shoulder pain < / =  3/10  to increase tolerance for return to playing music(Progressing, not met)  2. Increase PROM full motion without pain  (Progressing, not met)  3. Increased strength by 1/3 MMT grade in cuff to increase tolerance for ADL and work activities.(Progressing, not met)  4. Pt to tolerate HEP " to improve ROM and independence with ADL's(Progressing, not met)     Long Term Goals: 8 weeks  1.Report decreased shoulder pain  < / =  1 /10  to increase tolerance for return to sleeping through the night(Progressing, not met)  2.Increase AROM to full motion without shoulder pain(Progressing, not met)  3.Increase strength to >/= 4/5 in cuff and scap stabilizers to increase tolerance for ADL and work activities.(Progressing, not met)  4. Pt goal: return to playing music without shoulder pain (Progressing, not met)  5. Pt will have improved gcode of CJ (20-40% limited) on FOTO shoulder in order to demonstrate true functional improvement. (Progressing, not met)    Plan     Plan of care Certification: 11/28/2023 to 3/28/2024.     Improve L scap stabilization     Corwin Merritt, PT, DPT, OCS, FAAOMPT

## 2024-02-15 ENCOUNTER — PATIENT OUTREACH (OUTPATIENT)
Dept: OTHER | Facility: OTHER | Age: 74
End: 2024-02-15
Payer: COMMERCIAL

## 2024-02-15 ENCOUNTER — PATIENT MESSAGE (OUTPATIENT)
Dept: REHABILITATION | Facility: HOSPITAL | Age: 74
End: 2024-02-15
Payer: COMMERCIAL

## 2024-02-15 ENCOUNTER — PATIENT MESSAGE (OUTPATIENT)
Dept: ADMINISTRATIVE | Facility: OTHER | Age: 74
End: 2024-02-15
Payer: COMMERCIAL

## 2024-02-15 NOTE — PROGRESS NOTES
[facility]: Patient Outreach    Patient did not complete exercises due to flair up of back pain not relate to CB exercises. Closing out task.    Problem: MOBILITY - ADULT  Goal: Maintain or return to baseline ADL function  Description: INTERVENTIONS:  -  Assess patient's ability to carry out ADLs; assess patient's baseline for ADL function and identify physical deficits which impact ability to perform ADLs (bathing, care of mouth/teeth, toileting, grooming, dressing, etc )  - Assess/evaluate cause of self-care deficits   - Assess range of motion  - Assess patient's mobility; develop plan if impaired  - Assess patient's need for assistive devices and provide as appropriate  - Encourage maximum independence but intervene and supervise when necessary  - Involve family in performance of ADLs  - Assess for home care needs following discharge   - Consider OT consult to assist with ADL evaluation and planning for discharge  - Provide patient education as appropriate  Outcome: Progressing     Problem: Prexisting or High Potential for Compromised Skin Integrity  Goal: Skin integrity is maintained or improved  Description: INTERVENTIONS:  - Identify patients at risk for skin breakdown  - Assess and monitor skin integrity  - Assess and monitor nutrition and hydration status  - Monitor labs   - Assess for incontinence   - Turn and reposition patient  - Assist with mobility/ambulation  - Relieve pressure over bony prominences  - Avoid friction and shearing  - Provide appropriate hygiene as needed including keeping skin clean and dry  - Evaluate need for skin moisturizer/barrier cream  - Collaborate with interdisciplinary team   - Patient/family teaching  - Consider wound care consult   Outcome: Progressing

## 2024-02-16 ENCOUNTER — TELEPHONE (OUTPATIENT)
Dept: RHEUMATOLOGY | Facility: CLINIC | Age: 74
End: 2024-02-16
Payer: COMMERCIAL

## 2024-02-18 NOTE — PROGRESS NOTES
CC: Left shoulder pain    Raffy Rutherford Jr., presents today for follow up appointment of his left shoulder. He returns for repeat ultrasound guided long head of biceps tendon sheath CSI. He has a trip planned in early March. He reports some improvement overall with PT at [facility]. Patient does not report any new incidents or injuries since their last appointment.    Prior Hx 1/4/2024:   Raffy Rutherford Jr., presents today for follow up evaluation of his left shoulder. At last appointment, 11/28/2023, patient underwent  ultrasound guided long head of biceps tendon sheath CSI . Patient reports partial relief for a very brief period of time but reports he went to PT the same day as the injection so thinks he may not have given it adequate time to rest.  The shoulder remains painful - again localized primarily over the anterior proximal biceps region.  Present with repetitive overhead activity and reaching out away from himself.  Conservative care to this point has been extensive to include a total of 3 corticosteroid injections (1 GH , 2 biceps sheath).  Both the prior glenohumeral and July and the initial biceps sheath steroid injections were of significant benefit but the most recent injection did not provide as much relief.  Prior MRI showed biceps split tearing with tendinopathy, SLAP pathology, shoulder glenohumeral chondromalacia and some low-grade partial-thickness rotator cuff tearing.  Additional conservative care has included therapy and oral medication.  At this point he feels that he likely will need surgery but does not want to proceed until perhaps later in the year.  He has a ski trip planned in March to [place] for an adaptive skiing program and would like to discuss options in terms of getting ready for that.  He is accompanied by his wife who is 1 of our wound care specialists - Valarie Rutherford.  She works currently in the colon rectal surgery department.      Prior Hx 11/28/2023:   Raffy Rutherford  Luisa, a 73 y.o. male, presents today for follow up evaluation of his left shoulder.  Diagnosis of symptomatic SLAP tear, partial-thickness rotator cuff tear, biceps tendinitis.  Conservative care to this point.  At last appointment, 8/29/2023, patient underwent  long head of biceps tendon sheath CSI under ultrasound guidance which provided excellent near complete relief for a few months.  He would like to repeat that injection.  We have also discussed surgery in the past.  The patient would like to hold off on that.  He also would like to switch physical therapy back to [place] from [facility].  For logistical reasons.    Prior Hx 8/29/2023:   Raffy returns today for follow up of left shoulder pain. Received CSI at previous appointment, reports 100 % relief for 5-6 weeks.  Overall much better after this injection but does have now some recurrent pain and somewhat different location.  Localizes anteriorly over the proximal biceps groove.  This does bother him with activity.  He would like to discuss treatment options for this.  Going to PT with Ronald Zurita for his back and hip and OT with Vickie Perkins.  Under the care of my colleague Dr. Kaye Solis for his right hand.    Prior Hx 7/11/23:  Raffy returns to to discuss a possible left shoulder repeat steroid injection.  I saw him for the 1st time virtually recently.  MRI results reviewed.  He has low-grade undersurface cuff tearing with some degenerative changes within the glenohumeral joint and SLAP tearing/biceps tendinopathy.  Complaining of chronic left shoulder pain. He has been seen by multiple mid-level providers over in the General Orthopedic Department.  He was given a subacromial steroid injection in been March which provided approximately 70% relief in pain for a few weeks.  Additionally he has had physical therapy at the [facility] location with Ronald Zurita.  Has taken Celebrex and Mobic a different times.  Chief complaint of ongoing left shoulder pain  worse with overhead activity and after playing cello.  Today he localizes over his medial upper arm, occasionally superolateral with provocative movements.  He describes intermittent clicking and catching in the shoulder which is deep.  This is bothersome.  Denies any antecedent injury mechanism.      PMHx notable for cervical and lumbar spine DJD, bilateral foot drop  PSHx notable for left SHAARD 2012, L3-5 fusion 2015, L5-S1 fusion 2020  Negative for tobacco.   Negative for diabetes.    PAST MEDICAL HISTORY:   Past Medical History:   Diagnosis Date    Allergy     Arthritis     Back pain     after trauma beginning in 89    Cataract     Chronic pain     neck and left shoulder    Cluster headache 2013    Colon polyps 2007 2007-2019: TA x5, HP x3    Degenerative disc disease     Depression     Diverticulitis 12/2013    Dry eye syndrome     Fibromyalgia 2013    GERD (gastroesophageal reflux disease)     Hepatitis 1970's    A    History of prostate biopsy 2002    Hyperlipidemia     Joint pain     Prostate cancer 07/2021    PVD (posterior vitreous detachment)     Salzmann's nodular dystrophy of left eye     Sleep apnea     Thyroid nodule 07/16/2014    Trigeminal neuralgia of left side of face     Tubular adenoma of colon 2007    5 removed [id number]    Visual disturbance 2012    problems after cataract surgery     PAST SURGICAL HISTORY:  Past Surgical History:   Procedure Laterality Date    BACK SURGERY      CARPAL TUNNEL RELEASE Right 5/18/2021    Procedure: RELEASE, CARPAL TUNNEL;  Surgeon: Kaye Solis MD;  Location: [facility];  Service: Orthopedics;  Laterality: Right;    CATARACT EXTRACTION W/  INTRAOCULAR LENS IMPLANT Bilateral     CHOLECYSTECTOMY      COLONOSCOPY N/A 12/17/2019    Normal - Repeat 5yrs    COLONOSCOPY  2007 2007 TA x2, 2011 TA x3, 2014 HP x3, 2019 normal    COSMETIC SURGERY  2/10/2015    Direct mid-forehead brow lift    COSMETIC SURGERY  2/10/2015    Bilateral  upper lid blepahroplasty    CYSTOSCOPY WITH URETEROSCOPY, RETROGRADE PYELOGRAPHY, AND INSERTION OF STENT Left 8/19/2020    Procedure: CYSTOSCOPY, WITH RETROGRADE PYELOGRAM AND URETERAL STENT INSERTION;  Surgeon: Katelynn George MD;  Location: [facility];  Service: Urology;  Laterality: Left;    ESOPHAGOGASTRODUODENOSCOPY N/A 12/17/2019    Procedure: ESOPHAGOGASTRODUODENOSCOPY (EGD);  Surgeon: Amadou Hardin MD;  Location: [facility] ([address] [place] [address]);  Service: Endoscopy;  Laterality: N/A;    EYE SURGERY      FUSION OF LUMBAR SPINE BY ANTERIOR APPROACH N/A 8/20/2020    Procedure: FUSION, SPINE, LUMBAR, ANTERIOR APPROACH L5-S1 ALIF Stand Alone;  Surgeon: Jason Caldwell MD;  Location: [facility];  Service: Neurosurgery;  Laterality: N/A;    HEMORRHOID SURGERY      with complication of chronic bleeding for 6 weeks     INJECTION OF STEROID Right 12/6/2018    Procedure: INJECTION, STEROID Right SI Joint Block and Steroid Injection;  Surgeon: Jason Caldwell MD;  Location: [facility];  Service: Neurosurgery;  Laterality: Right;  Procedure: Right SI Joint Block and Steroid Injection  Surgery Time: 30 MIN  LOS: 0  Anesthesia: MAC  Radiology: C-arm  Bed: [place] 4 Poster  Position: Prone    INJECTION OF STEROID Right 9/19/2019    Procedure: INJECTION, STEROID Procedure: Right SI joint block nd steroid injection;  Surgeon: Jason Caldwell MD;  Location: [facility];  Service: Neurosurgery;  Laterality: Right;  Procedure: Right SI joint block nd steroid injection  Surgery Time: 30 mins  LOS:   Anesthesia: General MAC  Radiology:C-arm  Bed: Regular Bed  Position: Prone    IRRIGATION AND DEBRIDEMENT OF UPPER EXTREMITY Left 4/7/2022    Procedure: IRRIGATION AND DEBRIDEMENT, UPPER EXTREMITY;  Surgeon: Kaye Solis MD;  Location: [facility];  Service: Orthopedics;  Laterality: Left;    JOINT REPLACEMENT      KNEE SURGERY      involving arthroscopic surgery to both knees    REPAIR OF EXTENSOR TENDON Left 4/7/2022    Procedure: REPAIR,  TENDON, EXTENSOR thumb, EPB and EPL;  Surgeon: Kaye Solis MD;  Location: [facility] OR;  Service: Orthopedics;  Laterality: Left;    SINUS SURGERY      left molar and sinus surgery for trigeminal neuralgia    SPINAL FUSION  06/22/2015    L3-L5 XLIF/TANA    TOTAL HIP ARTHROPLASTY  4/2012    Pt states he had total hip replacement on his left hip.    ULNAR NERVE TRANSPOSITION Left 12/16/2020    Procedure: TRANSPOSITION, NERVE, ULNAR - left carpal and cubital tunnel releases;  Surgeon: Addi Bauer MD;  Location: [facility] OR;  Service: Orthopedics;  Laterality: Left;     FAMILY HISTORY:  Family History   Problem Relation Age of Onset    Stroke Mother 86    Colon cancer Mother         early/mid-60s    Uterine cancer Mother 77    Pancreatitis Brother         acute, now s/p nathalia    Diabetes Brother     Macular degeneration Brother     Other Brother         foot drop    Other Father 44        pituitary tumor- CA vs benign?    Heart attack Maternal Grandfather         suspected, 50s    Migraines Sister     Crohn's disease Sister         w/ assoc joint issues    Arthritis Sister     Tremor Daughter     Irritable bowel syndrome Son         leaning toward Crohn's dx    Neurological Disorders Son         nonspecific, benign fasciculations of calves    Tremor Son     Jaundice Grandchild     Other Maternal Uncle         memory issue    Other Maternal Uncle         memory issue    Cancer Maternal Cousin         origin? maybe thyroid? 40s?    Melanoma Neg Hx     Amblyopia Neg Hx     Blindness Neg Hx     Cataracts Neg Hx     Glaucoma Neg Hx     Hypertension Neg Hx     Retinal detachment Neg Hx     Strabismus Neg Hx     Thyroid disease Neg Hx     Allergic rhinitis Neg Hx     Allergies Neg Hx     Angioedema Neg Hx     Asthma Neg Hx     Eczema Neg Hx     Urticaria Neg Hx     Rhinitis Neg Hx     Immunodeficiency Neg Hx     Atopy Neg Hx      MEDICATIONS:    Current Outpatient Medications:      atorvastatin (LIPITOR) 80 MG tablet, Take 1 tablet (80 mg total) by mouth once daily., Disp: 30 tablet, Rfl: 5    butalbital-acetaminophen-caffeine -40 mg (FIORICET, ESGIC) -40 mg per tablet, Take 1 tablet by mouth daily as needed for 30 days., Disp: 30 tablet, Rfl: 0    celecoxib (CELEBREX) 100 MG capsule, Take 2 capsules (200 mg total) by mouth 2 (two) times daily., Disp: 180 capsule, Rfl: 3    clindamycin (CLEOCIN) 150 MG capsule, Take 4 capsules (600 mg total) by mouth 1 hour prior to dental appointment., Disp: 12 capsule, Rfl: 1    clonazePAM (KLONOPIN) 0.5 MG tablet, Take 1 tablet by mouth twice a day as needed for anxiety, Disp: 60 tablet, Rfl: 5    doxycycline (VIBRAMYCIN) 100 MG Cap, Take 1 capsule (100 mg total) by mouth twice daily for 2 days, THEN take 1 capsule (100 mg total) by mouth once daily for 28 days., Disp: 32 capsule, Rfl: 1    ergocalciferol (VITAMIN D2) 50,000 unit Cap, Take 1 capsule (50,000 Units total) by mouth every 7 days., Disp: 12 capsule, Rfl: 3    fremanezumab-vfrm (AJOVY SYRINGE) 225 mg/1.5 mL injection, Inject 1.5 mLs (225 mg total) into the skin every 28 days., Disp: 1.5 mL, Rfl: 5    HYDROcodone-acetaminophen (NORCO) 5-325 mg per tablet, Take 1 tablet by mouth every 8 (eight) hours as needed for Pain., Disp: 90 tablet, Rfl: 0    HYDROcodone-acetaminophen (NORCO) 5-325 mg per tablet, Take 1 tablet by mouth four times daily as needed for 30 days, Disp: 120 tablet, Rfl: 0    HYDROcodone-acetaminophen (NORCO) 5-325 mg per tablet, Take 1 tablet by mouth 4 (four) times daily as needed., Disp: 120 tablet, Rfl: 0    hydrocortisone-pramoxine (ANALPRAM-HC) 2.5-1 % Crea, Place rectally 3 (three) times daily., Disp: 30 g, Rfl: 2    lamoTRIgine (LAMICTAL) 200 MG tablet, Take 1 tablet (200 mg total) by mouth once daily., Disp: 90 tablet, Rfl: 3    perfluorohexyloctane, PF, 100 % Drop, Place 1 drop into both eyes 4 (four) times daily., Disp: 3 mL, Rfl: 11    pregabalin  (LYRICA) 225 MG Cap, Take 1 capsule (225 mg total) by mouth every evening., Disp: 93 capsule, Rfl: 3    pregabalin (LYRICA) 25 MG capsule, Take 2 capsules (50 mg total) by mouth once daily., Disp: 90 capsule, Rfl: 1    pregabalin (LYRICA) 25 MG capsule, Take 1 capsule (25 mg total) by mouth 2 (two) times a day., Disp: 180 capsule, Rfl: 0    pregabalin (LYRICA) 25 MG capsule, Take 1 capsule by mouth twice daily, Disp: 180 capsule, Rfl: 0    pregabalin (LYRICA) 25 MG capsule, Take 1 capsule (25 mg total) by mouth 2 (two) times a day., Disp: 180 capsule, Rfl: 0    pregabalin (LYRICA) 75 MG capsule, Take 3 capsules by mouth at night, Disp: 90 capsule, Rfl: 1    pregabalin (LYRICA) 75 MG capsule, Take 3 capsules by mouth at night, Disp: 270 capsule, Rfl: 0    pregabalin (LYRICA) 75 MG capsule, Take 3 capsules (225 mg total) by mouth every evening., Disp: 270 capsule, Rfl: 0    RABEprazole (ACIPHEX) 20 mg tablet, Take 1 tablet (20 mg total) by mouth 2 (two) times daily., Disp: 180 tablet, Rfl: 3    selegiline (EMSAM) 12 mg/24 hr, Place 1 patch onto the skin once daily., Disp: 30 patch, Rfl: 0    sildenafiL (VIAGRA) 100 MG tablet, Take 1 tablet (100 mg total) by mouth as needed for Erectile Dysfunction (take 30-60 minutes before on empty stomach). Take one hour before., Disp: 4 tablet, Rfl: 12    tadalafiL (CIALIS) 10 MG tablet, Take 1 tablet (10 mg total) by mouth daily as needed for Erectile Dysfunction., Disp: 30 tablet, Rfl: 11    tamsulosin (FLOMAX) 0.4 mg Cap, Take 1 capsule (0.4 mg total) by mouth once daily., Disp: 30 capsule, Rfl: 6    traZODone (DESYREL) 50 MG tablet, Take 1 tablet (50 mg total) by mouth every evening., Disp: 90 tablet, Rfl: 1    ubrogepant (UBRELVY) 100 mg tablet, Take 1 tablet (100 mg total) by mouth every 2 (two) hours as needed for Migraine. Max 200 mg/day., Disp: 16 tablet, Rfl: 5    UNABLE TO FIND, medication name: lidocaine 4% NS, Disp: , Rfl:     varenicline (TYRVAYA) 0.03  mg/spray sprm, 1 spray by Each Nostril route 2 (two) times a day., Disp: 8.4 mL, Rfl: 11    Current Facility-Administered Medications:     onabotulinumtoxina injection 200 Units, 200 Units, Intramuscular, q12 weeks, Macie Pearson FNP, 200 Units at 11/09/23 1109    ALLERGIES:  Review of patient's allergies indicates:   Allergen Reactions    Alphagan [brimonidine]      Patient taking MASO-B Selective Inhibitor Selegiline (Emsam)    Coumadin [warfarin]      itch    Oxycodone      hiccups     REVIEW OF SYSTEMS:  Constitution: Negative. Negative for chills, fever and night sweats.    Hematologic/Lymphatic: Negative for bleeding problem. Does not bruise/bleed easily.   Skin: Negative for dry skin, itching and rash.   Musculoskeletal: Negative for falls. Positive for left shoulder pain and muscle weakness.     All other review of symptoms were reviewed and found to be noncontributory.    PHYSICAL EXAMINATION:  Vitals:  There were no vitals taken for this visit.   General: Well-developed well-nourished 73 y.o. malein no acute distress   Cardiovascular: Regular rhythm by palpation of distal pulse, normal color and temperature, no concerning varicosities on symptomatic side   Lungs: No labored breathing or wheezing appreciated   Neuro: Alert and oriented ×3   Psychiatric: well oriented to person, place and time, demonstrates normal mood and affect   Skin: No rashes, lesions or ulcers, normal temperature, turgor, and texture on uninvolved extremity    Ortho/SPM Exam  Examination of the left shoulder again demonstrates active forward elevation to 160, ER with arm at side to 60, IR to T10.  Intact overhead passive range of motion.  Prominent pain over the proximal biceps groove.  Positive modified speed's test.  Minimal tenderness to palpation over the posterior glenohumeral joint line. 4+ out of 5 resisted scaption without much pain.  Mildly positive glenohumeral grind test.  No scapular winging.  Intact cervical  neck range of motion.    IMAGING:  Prior Xrays including AP, Outlet and Axillary Lateral of Left shoulder are ordered / images reviewed by me:   Mild DJD.    Prior MRI left shoulder 5/2023:  1. Supraspinatus tendinosis with articular surface fraying and a small, low-grade partial-thickness interstitial/concealed footprint tear.  2. Infraspinatus and subscapularis tendinosis.  3. SLAP tear that involves the biceps anchor.  4. Biceps tendinosis with partial-thickness interstitial tearing.  5. Mild glenohumeral osteoarthritis.  6. Subacromial/subdeltoid bursitis.  7. AC joint arthrosis.    ASSESSMENT:      ICD-10-CM ICD-9-CM   1. Superior glenoid labrum lesion of left shoulder  S43.432A 840.7   2. Chondromalacia of left shoulder  M94.212 733.92   3. Incomplete tear of left rotator cuff, unspecified whether traumatic  M75.112 840.4   4. Chronic left shoulder pain  M25.512 719.41    G89.29 338.29       PLAN:     As before, treatment options discussed.  The patient is not interested in shoulder surgery at this time.  He has an upcoming trip.  A repeat left shoulder ultrasound-guided biceps sheath steroid injection was provided.  He tolerated well.  Return to clinic as needed.    Tendon Sheath    Date/Time: 2/19/2024 3:00 PM    Performed by: GEORGIANA Up MD  Authorized by: GEORGIANA Up MD    Consent Done?:  Yes (Verbal)  Indications:  Pain  Site marked: the procedure site was marked    Timeout: prior to procedure the correct patient, procedure, and site was verified    Prep: patient was prepped and draped in usual sterile fashion      Local anesthesia used?: Yes    Anesthesia:  Local infiltration  Local anesthetic: 0.2% Naropin.  Anesthetic total (ml):  4    Location:  Shoulder  Site:  L bicep tendon  Ultrasonic guidance for needle placement?: Yes    Needle size:  22 G  Medications:  40 mg triamcinolone acetonide 40 mg/mL  Patient tolerance:  Patient tolerated the procedure well with no immediate  complications    Additional Comments: The ultrasound was used to gain visualization over the proximal biceps groove.  A representative picture was taken and saved showing the biceps groove and anatomy along with the needle introduced into the biceps sheath for the steroid injection.

## 2024-02-19 ENCOUNTER — OFFICE VISIT (OUTPATIENT)
Dept: SPORTS MEDICINE | Facility: CLINIC | Age: 74
End: 2024-02-19
Payer: COMMERCIAL

## 2024-02-19 ENCOUNTER — CLINICAL SUPPORT (OUTPATIENT)
Dept: REHABILITATION | Facility: HOSPITAL | Age: 74
End: 2024-02-19
Payer: COMMERCIAL

## 2024-02-19 VITALS
HEIGHT: 68 IN | SYSTOLIC BLOOD PRESSURE: 110 MMHG | WEIGHT: 176 LBS | BODY MASS INDEX: 26.67 KG/M2 | DIASTOLIC BLOOD PRESSURE: 66 MMHG | HEART RATE: 67 BPM

## 2024-02-19 DIAGNOSIS — M67.922 BICEPS TENDINOPATHY, LEFT: Primary | ICD-10-CM

## 2024-02-19 DIAGNOSIS — G89.29 CHRONIC LEFT SHOULDER PAIN: Primary | ICD-10-CM

## 2024-02-19 DIAGNOSIS — M94.212 CHONDROMALACIA OF LEFT SHOULDER: ICD-10-CM

## 2024-02-19 DIAGNOSIS — M25.512 CHRONIC LEFT SHOULDER PAIN: ICD-10-CM

## 2024-02-19 DIAGNOSIS — M25.512 CHRONIC LEFT SHOULDER PAIN: Primary | ICD-10-CM

## 2024-02-19 DIAGNOSIS — S43.432A SUPERIOR GLENOID LABRUM LESION OF LEFT SHOULDER: ICD-10-CM

## 2024-02-19 DIAGNOSIS — G89.29 CHRONIC LEFT SHOULDER PAIN: ICD-10-CM

## 2024-02-19 DIAGNOSIS — M75.112 INCOMPLETE TEAR OF LEFT ROTATOR CUFF, UNSPECIFIED WHETHER TRAUMATIC: ICD-10-CM

## 2024-02-19 PROCEDURE — 97140 MANUAL THERAPY 1/> REGIONS: CPT | Performed by: PHYSICAL THERAPIST

## 2024-02-19 PROCEDURE — 3008F BODY MASS INDEX DOCD: CPT | Mod: CPTII,S$GLB,, | Performed by: ORTHOPAEDIC SURGERY

## 2024-02-19 PROCEDURE — 97112 NEUROMUSCULAR REEDUCATION: CPT | Performed by: PHYSICAL THERAPIST

## 2024-02-19 PROCEDURE — 3074F SYST BP LT 130 MM HG: CPT | Mod: CPTII,S$GLB,, | Performed by: ORTHOPAEDIC SURGERY

## 2024-02-19 PROCEDURE — 76942 ECHO GUIDE FOR BIOPSY: CPT | Mod: S$GLB,,, | Performed by: ORTHOPAEDIC SURGERY

## 2024-02-19 PROCEDURE — 1159F MED LIST DOCD IN RCRD: CPT | Mod: CPTII,S$GLB,, | Performed by: ORTHOPAEDIC SURGERY

## 2024-02-19 PROCEDURE — 97530 THERAPEUTIC ACTIVITIES: CPT | Performed by: PHYSICAL THERAPIST

## 2024-02-19 PROCEDURE — 1157F ADVNC CARE PLAN IN RCRD: CPT | Mod: CPTII,S$GLB,, | Performed by: ORTHOPAEDIC SURGERY

## 2024-02-19 PROCEDURE — 3288F FALL RISK ASSESSMENT DOCD: CPT | Mod: CPTII,S$GLB,, | Performed by: ORTHOPAEDIC SURGERY

## 2024-02-19 PROCEDURE — 1125F AMNT PAIN NOTED PAIN PRSNT: CPT | Mod: CPTII,S$GLB,, | Performed by: ORTHOPAEDIC SURGERY

## 2024-02-19 PROCEDURE — 1101F PT FALLS ASSESS-DOCD LE1/YR: CPT | Mod: CPTII,S$GLB,, | Performed by: ORTHOPAEDIC SURGERY

## 2024-02-19 PROCEDURE — 3078F DIAST BP <80 MM HG: CPT | Mod: CPTII,S$GLB,, | Performed by: ORTHOPAEDIC SURGERY

## 2024-02-19 PROCEDURE — 99999 PR PBB SHADOW E&M-EST. PATIENT-LVL IV: CPT | Mod: PBBFAC,,, | Performed by: ORTHOPAEDIC SURGERY

## 2024-02-19 PROCEDURE — 20550 NJX 1 TENDON SHEATH/LIGAMENT: CPT | Mod: LT,S$GLB,, | Performed by: ORTHOPAEDIC SURGERY

## 2024-02-19 PROCEDURE — 99213 OFFICE O/P EST LOW 20 MIN: CPT | Mod: 25,S$GLB,, | Performed by: ORTHOPAEDIC SURGERY

## 2024-02-19 RX ORDER — SELEGILINE 12 MG/24H
1 PATCH TRANSDERMAL DAILY
Qty: 30 PATCH | Refills: 0 | Status: SHIPPED | OUTPATIENT
Start: 2024-02-19 | End: 2024-04-09

## 2024-02-19 RX ADMIN — TRIAMCINOLONE ACETONIDE 40 MG: 40 INJECTION, SUSPENSION INTRA-ARTICULAR; INTRAMUSCULAR at 03:02

## 2024-02-19 NOTE — PROGRESS NOTES
[facility]   Physical Therapy Treatment Note      Name: Raffy Rutherford Jr.  Clinic Number: [id number]    Therapy Diagnosis:   Encounter Diagnosis   Name Primary?    Chronic left shoulder pain Yes     Physician: Davin Castrejon DO    Visit Date: 2/19/2024    Physician Orders: PT Eval and Treat   Medical Diagnosis from Referral: M67.922 (ICD-10-CM) - Biceps tendinopathy, left   Evaluation Date: 11/28/2023  Authorization Period Expiration: 11/27/2024  Plan of Care Expiration: 3/28/2024  Progress Note Due: 1/1/2024  Visit # / Visits authorized: 10/20  Total visits: 15  FOTO: 1/1    PTA Visit #: 0/5     Time In: 1335  Time Out: 1440  Total Billable Time: 65 minutes    Subjective     Pt reports: I played a few hours this morning without pain, now its about 3/10 so better. Leave for my trip in 2 weeks    He was compliant with home exercise program.  Response to previous treatment: no change  Functional change: no changes    Pain: 5/10  Location: left shoulder      Objective      Objective Measures updated at progress report unless specified.     Treatment     Raffy received the treatments listed below:      therapeutic exercises to develop strength, endurance, ROM, flexibility, posture, and core stabilization for 0 minutes including:      manual therapy techniques: Joint mobilizations and Soft tissue Mobilization were applied to the: L shoulder for 10 minutes, including:    Gr I-II oscillations  Gr III flexion   Gr III IR/ER with centering glide    neuromuscular re-education activities to improve: Balance, Coordination, Sense, Proprioception, and Posture for 35 minutes. The following activities were included:    Serratus press 3# 2 x 10  SLR 3 x 10  Open books 2 x 15  CC pull down 7# 3 x 15  CC scaption 3# 3 x 12  Serratus press yellow sport cord 2 x 8    NT  Sidelying ER 1# 3 x 15  LAQ 2.5# 3 x 15  Supine IR/ER 2# controlled at 45 deg abd   Seated ER OTB 2 x 15  -super set LAQ 2#   Sidelying hip  "abd 2 x 15  -sidelying ER 2 x 15  Seated ankle dorsiflexion 2 x 10  No money OTB 2 x 10 3"  Supine T OTB 2 x 10  Sidelying flexion dowel 3 x 15  Sidelying serratus dowel 3 x 15  Supine flexion OTB 2 x 10  IR walkouts OTB 2 x 10      therapeutic activities to improve functional performance for 15 minutes, including:    AlterG walking 70% BW 15' 1.4 mph    NT  SLR supine 2 x 10  Scaptions 1# 2 x 10  Slot machines 4# 3 x 15  Row with ER OTB 2 x 10  Bridges 2 x 15  Prone extensions 2# 3 x 12  Prone row 3# 3 x 12  Sidelying clam GTB to improve gait and balance 2 x 10 5" hold      Patient Education and Home Exercises       Education provided:   - proper scap stabilization, LE strengthening for weakness    Written Home Exercises Provided: YES. Exercises were reviewed and Raffy was able to demonstrate them prior to the end of the session.  Raffy demonstrated good understanding of the education provided. See EMR under Patient Instructions for exercises provided during therapy sessions    Assessment     Patient seen prior to CSI to the biceps tendon. He progressed serratus strengthening with press using sport cord, given band to continue on his trip.     Raffy Is progressing well towards his goals.   Pt prognosis is Excellent.     Pt will continue to benefit from skilled outpatient physical therapy to address the deficits listed in the problem list box on initial evaluation, provide pt/family education and to maximize pt's level of independence in the home and community environment.     Pt's spiritual, cultural and educational needs considered and pt agreeable to plan of care and goals.     Anticipated barriers to physical therapy: none    GOALS: Short Term Goals:  4 weeks  1.Report decreased shoulder pain < / =  3/10  to increase tolerance for return to playing music(Progressing, not met)  2. Increase PROM full motion without pain  (Progressing, not met)  3. Increased strength by 1/3 MMT grade in cuff to increase " tolerance for ADL and work activities.(Progressing, not met)  4. Pt to tolerate HEP to improve ROM and independence with ADL's(Progressing, not met)     Long Term Goals: 8 weeks  1.Report decreased shoulder pain  < / =  1 /10  to increase tolerance for return to sleeping through the night(Progressing, not met)  2.Increase AROM to full motion without shoulder pain(Progressing, not met)  3.Increase strength to >/= 4/5 in cuff and scap stabilizers to increase tolerance for ADL and work activities.(Progressing, not met)  4. Pt goal: return to playing music without shoulder pain (Progressing, not met)  5. Pt will have improved gcode of CJ (20-40% limited) on FOTO shoulder in order to demonstrate true functional improvement. (Progressing, not met)    Plan     Plan of care Certification: 11/28/2023 to 3/28/2024.     Improve L scap stabilization     Corwin Merritt, PT, DPT, OCS, FAAOMPT

## 2024-02-19 NOTE — TELEPHONE ENCOUNTER
Refill Routing Note   Medication(s) are not appropriate for processing by [facility] for the following reason(s):        Outside of protocol    [facility] action(s):  Route               Appointments  past 12m or future 3m with PCP    Date Provider   Last Visit   8/28/2023 Haseeb Puentes MD   Next Visit   Visit date not found Haseeb Puentes MD   ED visits in past 90 days: 0        Note composed:10:26 AM 02/19/2024

## 2024-02-20 ENCOUNTER — TELEPHONE (OUTPATIENT)
Dept: RHEUMATOLOGY | Facility: CLINIC | Age: 74
End: 2024-02-20
Payer: COMMERCIAL

## 2024-02-22 ENCOUNTER — PATIENT OUTREACH (OUTPATIENT)
Dept: OTHER | Facility: OTHER | Age: 74
End: 2024-02-22
Payer: COMMERCIAL

## 2024-02-22 ENCOUNTER — PATIENT MESSAGE (OUTPATIENT)
Dept: ADMINISTRATIVE | Facility: OTHER | Age: 74
End: 2024-02-22
Payer: COMMERCIAL

## 2024-02-22 ENCOUNTER — CLINICAL SUPPORT (OUTPATIENT)
Dept: REHABILITATION | Facility: HOSPITAL | Age: 74
End: 2024-02-22
Payer: COMMERCIAL

## 2024-02-22 DIAGNOSIS — M25.512 CHRONIC LEFT SHOULDER PAIN: Primary | ICD-10-CM

## 2024-02-22 DIAGNOSIS — G89.29 CHRONIC LEFT SHOULDER PAIN: Primary | ICD-10-CM

## 2024-02-22 PROCEDURE — 97112 NEUROMUSCULAR REEDUCATION: CPT | Performed by: PHYSICAL THERAPIST

## 2024-02-22 PROCEDURE — 97530 THERAPEUTIC ACTIVITIES: CPT | Performed by: PHYSICAL THERAPIST

## 2024-02-23 ENCOUNTER — PATIENT MESSAGE (OUTPATIENT)
Dept: OPTOMETRY | Facility: CLINIC | Age: 74
End: 2024-02-23
Payer: COMMERCIAL

## 2024-02-23 ENCOUNTER — PATIENT MESSAGE (OUTPATIENT)
Dept: OPHTHALMOLOGY | Facility: CLINIC | Age: 74
End: 2024-02-23
Payer: COMMERCIAL

## 2024-02-25 ENCOUNTER — PATIENT MESSAGE (OUTPATIENT)
Dept: REHABILITATION | Facility: HOSPITAL | Age: 74
End: 2024-02-25
Payer: COMMERCIAL

## 2024-02-25 RX ORDER — TRIAMCINOLONE ACETONIDE 40 MG/ML
40 INJECTION, SUSPENSION INTRA-ARTICULAR; INTRAMUSCULAR
Status: DISCONTINUED | OUTPATIENT
Start: 2024-02-19 | End: 2024-02-25 | Stop reason: HOSPADM

## 2024-02-26 ENCOUNTER — PATIENT MESSAGE (OUTPATIENT)
Dept: NEUROLOGY | Facility: CLINIC | Age: 74
End: 2024-02-26
Payer: COMMERCIAL

## 2024-02-26 DIAGNOSIS — G43.909 MIGRAINE WITHOUT STATUS MIGRAINOSUS, NOT INTRACTABLE, UNSPECIFIED MIGRAINE TYPE: ICD-10-CM

## 2024-02-26 NOTE — PROGRESS NOTES
[facility]   Physical Therapy Treatment Note      Name: Raffy Rutherford Jr.  Clinic Number: [id number]    Therapy Diagnosis:   Encounter Diagnosis   Name Primary?    Chronic left shoulder pain Yes     Physician: Davin Castrejon DO    Visit Date: 2/22/2024    Physician Orders: PT Eval and Treat   Medical Diagnosis from Referral: M67.922 (ICD-10-CM) - Biceps tendinopathy, left   Evaluation Date: 11/28/2023  Authorization Period Expiration: 11/27/2024  Plan of Care Expiration: 3/28/2024  Progress Note Due: 1/1/2024  Visit # / Visits authorized: 11/20  Total visits: 15  FOTO: 1/1    PTA Visit #: 0/5     Time In: 1500  Time Out: 1600  Total Billable Time: 60 minutes    Subjective     Pt reports: Pain free after my injection, just need this to last the next month    He was compliant with home exercise program.  Response to previous treatment: no change  Functional change: no changes    Pain: 5/10  Location: left shoulder      Objective      Objective Measures updated at progress report unless specified.     Treatment     Raffy received the treatments listed below:      therapeutic exercises to develop strength, endurance, ROM, flexibility, posture, and core stabilization for 0 minutes including:      manual therapy techniques: Joint mobilizations and Soft tissue Mobilization were applied to the: L shoulder for 10 minutes, including:    Gr I-II oscillations  Gr III flexion   Gr III IR/ER with centering glide    neuromuscular re-education activities to improve: Balance, Coordination, Sense, Proprioception, and Posture for 35 minutes. The following activities were included:    Serratus press 3# 2 x 10  SLR 3 x 10  Open books 2 x 15  Hip abduction 2 x15  Handcuffs YTB 3 x 10  Supine abd bracing with marching 2 x 10    NT  CC pull down 7# 3 x 15  CC scaption 3# 3 x 12  Serratus press yellow sport cord 2 x 8  Sidelying ER 1# 3 x 15  LAQ 2.5# 3 x 15  Supine IR/ER 2# controlled at 45 deg abd   Seated ER OTB  "2 x 15  -super set LAQ 2#   Sidelying hip abd 2 x 15  -sidelying ER 2 x 15  Seated ankle dorsiflexion 2 x 10  No money OTB 2 x 10 3"  Supine T OTB 2 x 10  Sidelying flexion dowel 3 x 15  Sidelying serratus dowel 3 x 15  Supine flexion OTB 2 x 10  IR walkouts OTB 2 x 10      therapeutic activities to improve functional performance for 15 minutes, including:    AlterG walking 70% BW 15' 1.4 mph    NT  SLR supine 2 x 10  Scaptions 1# 2 x 10  Slot machines 4# 3 x 15  Row with ER OTB 2 x 10  Bridges 2 x 15  Prone extensions 2# 3 x 12  Prone row 3# 3 x 12  Sidelying clam GTB to improve gait and balance 2 x 10 5" hold      Patient Education and Home Exercises       Education provided:   - proper scap stabilization, LE strengthening for weakness    Written Home Exercises Provided: YES. Exercises were reviewed and Raffy was able to demonstrate them prior to the end of the session.  Raffy demonstrated good understanding of the education provided. See EMR under Patient Instructions for exercises provided during therapy sessions    Assessment     Notes he was able to walk well on alterG in new shoes, continue to endurance train for his trip. He is doing very well in the shoulder, continue to strengthening to prevent pain reoccurrence     Raffy Is progressing well towards his goals.   Pt prognosis is Excellent.     Pt will continue to benefit from skilled outpatient physical therapy to address the deficits listed in the problem list box on initial evaluation, provide pt/family education and to maximize pt's level of independence in the home and community environment.     Pt's spiritual, cultural and educational needs considered and pt agreeable to plan of care and goals.     Anticipated barriers to physical therapy: none    GOALS: Short Term Goals:  4 weeks  1.Report decreased shoulder pain < / =  3/10  to increase tolerance for return to playing music(Progressing, not met)  2. Increase PROM full motion without pain  " (Progressing, not met)  3. Increased strength by 1/3 MMT grade in cuff to increase tolerance for ADL and work activities.(Progressing, not met)  4. Pt to tolerate HEP to improve ROM and independence with ADL's(Progressing, not met)     Long Term Goals: 8 weeks  1.Report decreased shoulder pain  < / =  1 /10  to increase tolerance for return to sleeping through the night(Progressing, not met)  2.Increase AROM to full motion without shoulder pain(Progressing, not met)  3.Increase strength to >/= 4/5 in cuff and scap stabilizers to increase tolerance for ADL and work activities.(Progressing, not met)  4. Pt goal: return to playing music without shoulder pain (Progressing, not met)  5. Pt will have improved gcode of CJ (20-40% limited) on FOTO shoulder in order to demonstrate true functional improvement. (Progressing, not met)    Plan     Plan of care Certification: 11/28/2023 to 3/28/2024.     Improve L scap stabilization     Corwin Merritt, PT, DPT, OCS, FAAOMPT

## 2024-02-27 ENCOUNTER — OFFICE VISIT (OUTPATIENT)
Dept: OPHTHALMOLOGY | Facility: CLINIC | Age: 74
End: 2024-02-27
Payer: COMMERCIAL

## 2024-02-27 ENCOUNTER — PATIENT MESSAGE (OUTPATIENT)
Dept: UROLOGY | Facility: CLINIC | Age: 74
End: 2024-02-27

## 2024-02-27 ENCOUNTER — OFFICE VISIT (OUTPATIENT)
Dept: UROLOGY | Facility: CLINIC | Age: 74
End: 2024-02-27
Payer: COMMERCIAL

## 2024-02-27 ENCOUNTER — PATIENT MESSAGE (OUTPATIENT)
Dept: REHABILITATION | Facility: HOSPITAL | Age: 74
End: 2024-02-27
Payer: COMMERCIAL

## 2024-02-27 VITALS
DIASTOLIC BLOOD PRESSURE: 78 MMHG | WEIGHT: 177 LBS | HEIGHT: 68 IN | SYSTOLIC BLOOD PRESSURE: 125 MMHG | HEART RATE: 56 BPM | BODY MASS INDEX: 26.83 KG/M2

## 2024-02-27 DIAGNOSIS — R35.0 URINARY FREQUENCY: ICD-10-CM

## 2024-02-27 DIAGNOSIS — H02.886 MEIBOMIAN GLAND DYSFUNCTION (MGD) OF BOTH EYES: ICD-10-CM

## 2024-02-27 DIAGNOSIS — C61 PROSTATE CANCER: ICD-10-CM

## 2024-02-27 DIAGNOSIS — Z96.1 PSEUDOPHAKIA OF BOTH EYES: ICD-10-CM

## 2024-02-27 DIAGNOSIS — H43.813 POSTERIOR VITREOUS DETACHMENT OF BOTH EYES: Primary | ICD-10-CM

## 2024-02-27 DIAGNOSIS — R39.12 WEAK URINARY STREAM: ICD-10-CM

## 2024-02-27 DIAGNOSIS — H04.123 DRY EYE SYNDROME OF BOTH EYES: ICD-10-CM

## 2024-02-27 DIAGNOSIS — H02.883 MEIBOMIAN GLAND DYSFUNCTION (MGD) OF BOTH EYES: ICD-10-CM

## 2024-02-27 DIAGNOSIS — Z00.00 ENCOUNTER FOR MEDICARE ANNUAL WELLNESS EXAM: ICD-10-CM

## 2024-02-27 DIAGNOSIS — R39.9 LOWER URINARY TRACT SYMPTOMS (LUTS): Primary | ICD-10-CM

## 2024-02-27 DIAGNOSIS — N52.9 ED (ERECTILE DYSFUNCTION) OF ORGANIC ORIGIN: ICD-10-CM

## 2024-02-27 PROCEDURE — 1157F ADVNC CARE PLAN IN RCRD: CPT | Mod: CPTII,S$GLB,, | Performed by: NURSE PRACTITIONER

## 2024-02-27 PROCEDURE — 1159F MED LIST DOCD IN RCRD: CPT | Mod: CPTII,S$GLB,, | Performed by: NURSE PRACTITIONER

## 2024-02-27 PROCEDURE — 1126F AMNT PAIN NOTED NONE PRSNT: CPT | Mod: CPTII,S$GLB,, | Performed by: OPHTHALMOLOGY

## 2024-02-27 PROCEDURE — 99999 PR PBB SHADOW E&M-EST. PATIENT-LVL V: CPT | Mod: PBBFAC,,, | Performed by: NURSE PRACTITIONER

## 2024-02-27 PROCEDURE — 1101F PT FALLS ASSESS-DOCD LE1/YR: CPT | Mod: CPTII,S$GLB,, | Performed by: NURSE PRACTITIONER

## 2024-02-27 PROCEDURE — 1159F MED LIST DOCD IN RCRD: CPT | Mod: CPTII,S$GLB,, | Performed by: OPHTHALMOLOGY

## 2024-02-27 PROCEDURE — 3074F SYST BP LT 130 MM HG: CPT | Mod: CPTII,S$GLB,, | Performed by: NURSE PRACTITIONER

## 2024-02-27 PROCEDURE — 1126F AMNT PAIN NOTED NONE PRSNT: CPT | Mod: CPTII,S$GLB,, | Performed by: NURSE PRACTITIONER

## 2024-02-27 PROCEDURE — 1160F RVW MEDS BY RX/DR IN RCRD: CPT | Mod: CPTII,S$GLB,, | Performed by: NURSE PRACTITIONER

## 2024-02-27 PROCEDURE — 1160F RVW MEDS BY RX/DR IN RCRD: CPT | Mod: CPTII,S$GLB,, | Performed by: OPHTHALMOLOGY

## 2024-02-27 PROCEDURE — 1101F PT FALLS ASSESS-DOCD LE1/YR: CPT | Mod: CPTII,S$GLB,, | Performed by: OPHTHALMOLOGY

## 2024-02-27 PROCEDURE — 99214 OFFICE O/P EST MOD 30 MIN: CPT | Mod: S$GLB,,, | Performed by: NURSE PRACTITIONER

## 2024-02-27 PROCEDURE — 92201 OPSCPY EXTND RTA DRAW UNI/BI: CPT | Mod: S$GLB,,, | Performed by: OPHTHALMOLOGY

## 2024-02-27 PROCEDURE — 99214 OFFICE O/P EST MOD 30 MIN: CPT | Mod: S$GLB,,, | Performed by: OPHTHALMOLOGY

## 2024-02-27 PROCEDURE — 1157F ADVNC CARE PLAN IN RCRD: CPT | Mod: CPTII,S$GLB,, | Performed by: OPHTHALMOLOGY

## 2024-02-27 PROCEDURE — 3288F FALL RISK ASSESSMENT DOCD: CPT | Mod: CPTII,S$GLB,, | Performed by: OPHTHALMOLOGY

## 2024-02-27 PROCEDURE — 99999 PR PBB SHADOW E&M-EST. PATIENT-LVL IV: CPT | Mod: PBBFAC,,, | Performed by: OPHTHALMOLOGY

## 2024-02-27 PROCEDURE — 3008F BODY MASS INDEX DOCD: CPT | Mod: CPTII,S$GLB,, | Performed by: NURSE PRACTITIONER

## 2024-02-27 PROCEDURE — 3078F DIAST BP <80 MM HG: CPT | Mod: CPTII,S$GLB,, | Performed by: NURSE PRACTITIONER

## 2024-02-27 PROCEDURE — 3288F FALL RISK ASSESSMENT DOCD: CPT | Mod: CPTII,S$GLB,, | Performed by: NURSE PRACTITIONER

## 2024-02-27 PROCEDURE — 92134 CPTRZ OPH DX IMG PST SGM RTA: CPT | Mod: S$GLB,,, | Performed by: OPHTHALMOLOGY

## 2024-02-27 RX ORDER — BUTALBITAL, ACETAMINOPHEN AND CAFFEINE 50; 325; 40 MG/1; MG/1; MG/1
TABLET ORAL
Qty: 30 TABLET | Refills: 0 | Status: SHIPPED | OUTPATIENT
Start: 2024-02-27 | End: 2024-03-06 | Stop reason: SDUPTHER

## 2024-02-27 RX ORDER — SILODOSIN 4 MG/1
4 CAPSULE ORAL DAILY
Qty: 30 CAPSULE | Refills: 11 | Status: SHIPPED | OUTPATIENT
Start: 2024-02-27 | End: 2024-04-09

## 2024-02-27 NOTE — PROGRESS NOTES
HPI    DLS 01/22/2024 by Dr Zack Kenny MD    Cc: pt states: vision is blurry    -eye pain  +blurred va  --flashes/++floater OD  ++headaches   -curtain /shadows/veil      Eye Meds: Doxycycline daily for 28 days                    Meibo QID OD nightly                          POHX:   1. Meibomian Gland Dysfunction OU   2. Dry Eyes OU   3. 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         A/P    ICD-10-CM ICD-9-CM   1. Posterior vitreous detachment of both eyes  H43.813 379.21   2. Pseudophakia of both eyes  Z96.1 V43.1   3. Dry eye syndrome of both eyes  H04.123 375.15   4. Meibomian gland dysfunction (MGD) of both eyes  H02.883 374.89    H02.886        1. Posterior vitreous detachment of both eyes  Here for urgent floater eval   Has had floaters for long time, but new floaters appeared OD recently  No trauma    Exam today notable for PVD OU, no RT/RD with , no heme  Plan: counseled at length regarding RT/RD risk in setting of PVD, pt is going abroad for several weeks in March, counseled to seek care if having any acute change in vision immediately, otherwise we will recheck fresh PVD OD in 1 mo  Pathology of PVD, Retinal Tear, Retinal Detachment reviewed in great detail  RD precautions discussed in detail, patient expressed understanding  RTC immediately PRN (especially ANY change flashes, floaters, vision, visual field)     2. Pseudophakia of both eyes  Good lens position OU  Plan: Observation, update Mrx prn     3. Dry eye syndrome of both eyes  4. Meibomian gland dysfunction (MGD) of both eyes  Mild dry eye  Currently using Meibo QID  Plan: continue Meibo for now    RTC 1 mo DFE/OCTm OU monitor new PVD OD    I saw and examined the patient and reviewed in detail  the findings documented. The final examination findings, image interpretations which have been independently interpreted, and plan as documented in the record represent my personal judgment and conclusions.    Turner Franklin MD  Vitreoretinal Surgery   [facility]

## 2024-02-27 NOTE — PROGRESS NOTES
CHIEF COMPLAINT:    Raffy Rutherford Jr. is a 73 y.o. male presents today for Worsening LUTS.     HISTORY OF PRESENTING ILLINESS:    Raffy Rutherford Jr. is a 73 y.o. Male with a history of BPH with LUTS, prostatitis, and now Prostate Cancer. He is currently on AS with Rad/Onc.  He saw Dr. Adame 10/27/2022,     Last clinic visit with me was 12/14/2022.   He is here today due to some worsening LUTS.   He is reporting more urinary frequency/nocturia.   No dysuria or hematuria; no leakage.   Has a new Rx for Flomax from Dr. Adame but not taking it.  He had been successfully managing his LUTS with Cialis 5mg daily. He failed Flomax and Finasteride; he did not like the side effects  No dysuria or hematuria.   No new meds.   Not drinking a lot of fluids at night.      They are going on a cruise in a couple weeks. Just making sure there is not seriously going on/something can be done.     Also reporting slight change in his erections; functional but not as stronger.      PROSTATE CANCER HISTORY:     He was diagnosed 06/15/2021 with PSA of 8.5 at the time. PI RADs 4 lesion on MRI  Prostate was 46.7cc  Daniel 7(3+4) in 4 of 6 cores at right apex  04/04/2021 Prolaris Molecular Score was 3.2  He was seen in clinic with Dr. Cheng on 07/22/2021 for initial Talk  Currently on AS being followed by Rad/Onc     10/16/2022 MRI of Prostate:  -right anterior Tz (PI-RADS 4), stable from 05/25/2022 MRI.  - right mid anterior Pz lesion (PI-RADS 3), more conspicuous compared to prior exam.  No extracapsular extension.  No abnormal lymph nodes.  -2 target lesions     08/30/2023 MRI of PROSTATE:  - PSA 11.1  - 53cc  - (-) EPE, NVB, SV  - PI RADS 4: Ant Pz & Ant Tz.    He is followed by Dr. Adame in Rad/Onc.  Last office visit 02/01/2024.   Options discussed; repeat PSA 4-6 months.              REVIEW OF SYSTEMS:  Review of Systems   Constitutional: Negative.  Negative for chills and fever.   Eyes:  Negative for double vision.    Respiratory:  Negative for cough and shortness of breath.    Cardiovascular:  Negative for chest pain.   Gastrointestinal:  Negative for abdominal pain, constipation, diarrhea, nausea and vomiting.   Genitourinary:  Positive for frequency and urgency. Negative for dysuria, flank pain and hematuria.        Worsening LUTS     Neurological:  Negative for dizziness and seizures.   Endo/Heme/Allergies:  Negative for polydipsia.         PATIENT HISTORY:    Past Medical History:   Diagnosis Date    Allergy     Arthritis     Back pain     after trauma beginning in 89    Cataract     Chronic pain     neck and left shoulder    Cluster headache 2013    Colon polyps 2007 2007-2019: TA x5, HP x3    Degenerative disc disease     Depression     Diverticulitis 12/2013    Dry eye syndrome     Fibromyalgia 2013    GERD (gastroesophageal reflux disease)     Hepatitis 1970's    A    History of prostate biopsy 2002    Hyperlipidemia     Joint pain     Prostate cancer 07/2021    PVD (posterior vitreous detachment)     Salzmann's nodular dystrophy of left eye     Sleep apnea     Thyroid nodule 07/16/2014    Trigeminal neuralgia of left side of face     Tubular adenoma of colon 2007    5 removed [id number]    Visual disturbance 2012    problems after cataract surgery       Past Surgical History:   Procedure Laterality Date    BACK SURGERY      CARPAL TUNNEL RELEASE Right 5/18/2021    Procedure: RELEASE, CARPAL TUNNEL;  Surgeon: Kaye Solis MD;  Location: [facility];  Service: Orthopedics;  Laterality: Right;    CATARACT EXTRACTION W/  INTRAOCULAR LENS IMPLANT Bilateral     CHOLECYSTECTOMY      COLONOSCOPY N/A 12/17/2019    Normal - Repeat 5yrs    COLONOSCOPY  2007 2007 TA x2, 2011 TA x3, 2014 HP x3, 2019 normal    COSMETIC SURGERY  2/10/2015    Direct mid-forehead brow lift    COSMETIC SURGERY  2/10/2015    Bilateral upper lid blepahroplasty    CYSTOSCOPY WITH URETEROSCOPY, RETROGRADE PYELOGRAPHY, AND INSERTION OF STENT Left  8/19/2020    Procedure: CYSTOSCOPY, WITH RETROGRADE PYELOGRAM AND URETERAL STENT INSERTION;  Surgeon: Katelynn George MD;  Location: [facility] OR;  Service: Urology;  Laterality: Left;    ESOPHAGOGASTRODUODENOSCOPY N/A 12/17/2019    Procedure: ESOPHAGOGASTRODUODENOSCOPY (EGD);  Surgeon: Amadou Hadrin MD;  Location: [facility] ([address] [place] [address]);  Service: Endoscopy;  Laterality: N/A;    EYE SURGERY      FUSION OF LUMBAR SPINE BY ANTERIOR APPROACH N/A 8/20/2020    Procedure: FUSION, SPINE, LUMBAR, ANTERIOR APPROACH L5-S1 ALIF Stand Alone;  Surgeon: Jason Caldwell MD;  Location: [facility] OR;  Service: Neurosurgery;  Laterality: N/A;    HEMORRHOID SURGERY      with complication of chronic bleeding for 6 weeks     INJECTION OF STEROID Right 12/6/2018    Procedure: INJECTION, STEROID Right SI Joint Block and Steroid Injection;  Surgeon: Jason Caldwell MD;  Location: [facility];  Service: Neurosurgery;  Laterality: Right;  Procedure: Right SI Joint Block and Steroid Injection  Surgery Time: 30 MIN  LOS: 0  Anesthesia: MAC  Radiology: C-arm  Bed: [place] 4 Poster  Position: Prone    INJECTION OF STEROID Right 9/19/2019    Procedure: INJECTION, STEROID Procedure: Right SI joint block nd steroid injection;  Surgeon: Jason Caldwell MD;  Location: [facility];  Service: Neurosurgery;  Laterality: Right;  Procedure: Right SI joint block nd steroid injection  Surgery Time: 30 mins  LOS:   Anesthesia: General MAC  Radiology:C-arm  Bed: Regular Bed  Position: Prone    IRRIGATION AND DEBRIDEMENT OF UPPER EXTREMITY Left 4/7/2022    Procedure: IRRIGATION AND DEBRIDEMENT, UPPER EXTREMITY;  Surgeon: Kaye Solis MD;  Location: [facility];  Service: Orthopedics;  Laterality: Left;    JOINT REPLACEMENT      KNEE SURGERY      involving arthroscopic surgery to both knees    REPAIR OF EXTENSOR TENDON Left 4/7/2022    Procedure: REPAIR, TENDON, EXTENSOR thumb, EPB and EPL;  Surgeon: Kaye Solis MD;  Location: [facility];  Service: Orthopedics;  Laterality:  Left;    SINUS SURGERY      left molar and sinus surgery for trigeminal neuralgia    SPINAL FUSION  06/22/2015    L3-L5 XLIF/TANA    TOTAL HIP ARTHROPLASTY  4/2012    Pt states he had total hip replacement on his left hip.    ULNAR NERVE TRANSPOSITION Left 12/16/2020    Procedure: TRANSPOSITION, NERVE, ULNAR - left carpal and cubital tunnel releases;  Surgeon: Addi Bauer MD;  Location: [facility];  Service: Orthopedics;  Laterality: Left;       Family History   Problem Relation Age of Onset    Stroke Mother 86    Colon cancer Mother         early/mid-60s    Uterine cancer Mother 77    Pancreatitis Brother         acute, now s/p nathalia    Diabetes Brother     Macular degeneration Brother     Other Brother         foot drop    Other Father 44        pituitary tumor- CA vs benign?    Heart attack Maternal Grandfather         suspected, 50s    Migraines Sister     Crohn's disease Sister         w/ assoc joint issues    Arthritis Sister     Tremor Daughter     Irritable bowel syndrome Son         leaning toward Crohn's dx    Neurological Disorders Son         nonspecific, benign fasciculations of calves    Tremor Son     Jaundice Grandchild     Other Maternal Uncle         memory issue    Other Maternal Uncle         memory issue    Cancer Maternal Cousin         origin? maybe thyroid? 40s?    Melanoma Neg Hx     Amblyopia Neg Hx     Blindness Neg Hx     Cataracts Neg Hx     Glaucoma Neg Hx     Hypertension Neg Hx     Retinal detachment Neg Hx     Strabismus Neg Hx     Thyroid disease Neg Hx     Allergic rhinitis Neg Hx     Allergies Neg Hx     Angioedema Neg Hx     Asthma Neg Hx     Eczema Neg Hx     Urticaria Neg Hx     Rhinitis Neg Hx     Immunodeficiency Neg Hx     Atopy Neg Hx        Social History     Socioeconomic History    Marital status:    Occupational History    Occupation: Psychotherpist    Tobacco Use    Smoking status: Never    Smokeless tobacco: Never   Substance and Sexual Activity     Alcohol use: Yes     Comment: occasion    Drug use: No    Sexual activity: Yes     Partners: Female     Social Determinants of Health     Financial Resource Strain: Low Risk  (2/1/2024)    Overall Financial Resource Strain (CARDIA)     Difficulty of Paying Living Expenses: Not hard at all   Food Insecurity: No Food Insecurity (2/1/2024)    Hunger Vital Sign     Worried About Running Out of Food in the Last Year: Never true     Ran Out of Food in the Last Year: Never true   Transportation Needs: No Transportation Needs (2/1/2024)    PRAPARE - Transportation     Lack of Transportation (Medical): No     Lack of Transportation (Non-Medical): No   Physical Activity: Insufficiently Active (2/1/2024)    Exercise Vital Sign     Days of Exercise per Week: 2 days     Minutes of Exercise per Session: 10 min   Stress: No Stress Concern Present (2/1/2024)    [place] [employer] - Occupational Stress Questionnaire     Feeling of Stress : Not at all   Social Connections: Unknown (2/1/2024)    Social Connection and Isolation Panel [NHANES]     Frequency of Communication with Friends and Family: Three times a week     Frequency of Social Gatherings with Friends and Family: Once a week     Active Member of Clubs or Organizations: No     Attends Club or Organization Meetings: Never     Marital Status:    Housing Stability: Low Risk  (2/1/2024)    Housing Stability Vital Sign     Unable to Pay for Housing in the Last Year: No     Number of Places Lived in the Last Year: 1     Unstable Housing in the Last Year: No       Allergies:  Alphagan [brimonidine], Coumadin [warfarin], and Oxycodone    Medications:    Current Outpatient Medications:     atorvastatin (LIPITOR) 40 MG tablet, Take 1 tablet (40 mg total) by mouth once daily., Disp: 90 tablet, Rfl: 1    celecoxib (CELEBREX) 100 MG capsule, Take 2 capsules (200 mg total) by mouth 2 (two) times daily., Disp: 180 capsule, Rfl: 3    clindamycin (CLEOCIN) 150 MG  capsule, Take 4 capsules (600 mg total) by mouth 1 hour prior to dental appointment., Disp: 12 capsule, Rfl: 1    clonazePAM (KLONOPIN) 0.5 MG tablet, Take 1 tablet by mouth twice a day as needed for anxiety, Disp: 60 tablet, Rfl: 5    doxycycline (VIBRAMYCIN) 100 MG Cap, Take 1 capsule (100 mg total) by mouth twice daily for 2 days, THEN take 1 capsule (100 mg total) by mouth once daily for 28 days., Disp: 32 capsule, Rfl: 1    ergocalciferol (VITAMIN D2) 50,000 unit Cap, Take 1 capsule (50,000 Units total) by mouth every 7 days., Disp: 12 capsule, Rfl: 3    fremanezumab-vfrm (AJOVY SYRINGE) 225 mg/1.5 mL injection, Inject 1.5 mLs (225 mg total) into the skin every 28 days., Disp: 1.5 mL, Rfl: 5    HYDROcodone-acetaminophen (NORCO) 5-325 mg per tablet, Take 1 tablet by mouth every 8 (eight) hours as needed for Pain., Disp: 90 tablet, Rfl: 0    HYDROcodone-acetaminophen (NORCO) 5-325 mg per tablet, Take 1 tablet by mouth four times daily as needed for 30 days, Disp: 120 tablet, Rfl: 0    HYDROcodone-acetaminophen (NORCO) 5-325 mg per tablet, Take 1 tablet by mouth 4 (four) times daily as needed., Disp: 120 tablet, Rfl: 0    hydrocortisone-pramoxine (ANALPRAM-HC) 2.5-1 % Crea, Place rectally 3 (three) times daily., Disp: 30 g, Rfl: 2    lamoTRIgine (LAMICTAL) 200 MG tablet, Take 1 tablet (200 mg total) by mouth once daily., Disp: 90 tablet, Rfl: 3    perfluorohexyloctane, PF, 100 % Drop, Place 1 drop into both eyes 4 (four) times daily., Disp: 3 mL, Rfl: 11    pregabalin (LYRICA) 225 MG Cap, Take 1 capsule (225 mg total) by mouth every evening., Disp: 93 capsule, Rfl: 3    pregabalin (LYRICA) 25 MG capsule, Take 2 capsules (50 mg total) by mouth once daily., Disp: 90 capsule, Rfl: 1    pregabalin (LYRICA) 25 MG capsule, Take 1 capsule (25 mg total) by mouth 2 (two) times a day., Disp: 180 capsule, Rfl: 0    pregabalin (LYRICA) 25 MG capsule, Take 1 capsule by mouth twice daily, Disp: 180 capsule, Rfl: 0    pregabalin  (LYRICA) 25 MG capsule, Take 1 capsule (25 mg total) by mouth 2 (two) times a day., Disp: 180 capsule, Rfl: 0    pregabalin (LYRICA) 75 MG capsule, Take 3 capsules by mouth at night, Disp: 90 capsule, Rfl: 1    pregabalin (LYRICA) 75 MG capsule, Take 3 capsules by mouth at night, Disp: 270 capsule, Rfl: 0    pregabalin (LYRICA) 75 MG capsule, Take 3 capsules (225 mg total) by mouth every evening., Disp: 270 capsule, Rfl: 0    RABEprazole (ACIPHEX) 20 mg tablet, Take 1 tablet (20 mg total) by mouth 2 (two) times daily., Disp: 180 tablet, Rfl: 3    selegiline (EMSAM) 12 mg/24 hr, Place 1 patch onto the skin once daily., Disp: 30 patch, Rfl: 0    sildenafiL (VIAGRA) 100 MG tablet, Take 1 tablet (100 mg total) by mouth as needed for Erectile Dysfunction (take 30-60 minutes before on empty stomach). Take one hour before., Disp: 4 tablet, Rfl: 12    tadalafiL (CIALIS) 10 MG tablet, Take 1 tablet (10 mg total) by mouth daily as needed for Erectile Dysfunction., Disp: 30 tablet, Rfl: 11    traZODone (DESYREL) 50 MG tablet, Take 1 tablet (50 mg total) by mouth every evening., Disp: 90 tablet, Rfl: 1    ubrogepant (UBRELVY) 100 mg tablet, Take 1 tablet (100 mg total) by mouth every 2 (two) hours as needed for Migraine. Max 200 mg/day., Disp: 16 tablet, Rfl: 5    UNABLE TO FIND, medication name: lidocaine 4% NS, Disp: , Rfl:     varenicline (TYRVAYA) 0.03 mg/spray sprm, 1 spray by Each Nostril route 2 (two) times a day., Disp: 8.4 mL, Rfl: 11    butalbital-acetaminophen-caffeine -40 mg (FIORICET, ESGIC) -40 mg per tablet, Take 1 tablet by mouth daily as needed for 30 days., Disp: 30 tablet, Rfl: 0    silodosin (RAPAFLO) 4 mg Cap capsule, Take 1 capsule (4 mg total) by mouth once daily., Disp: 30 capsule, Rfl: 11    Current Facility-Administered Medications:     onabotulinumtoxina injection 200 Units, 200 Units, Intramuscular, q12 weeks, Macie Pearson FNP, 200 Units at 11/09/23 1109    PHYSICAL  EXAMINATION:  Physical Exam  Vitals and nursing note reviewed.   Constitutional:       General: He is awake.      Appearance: Normal appearance.   HENT:      Head: Normocephalic.      Right Ear: External ear normal.      Left Ear: External ear normal.      Nose: Nose normal.   Cardiovascular:      Rate and Rhythm: Normal rate.   Pulmonary:      Effort: Pulmonary effort is normal. No respiratory distress.   Abdominal:      Tenderness: There is no abdominal tenderness. There is no right CVA tenderness or left CVA tenderness.   Musculoskeletal:         General: Normal range of motion.      Cervical back: Normal range of motion.   Skin:     General: Skin is warm and dry.   Neurological:      General: No focal deficit present.      Mental Status: He is alert and oriented to person, place, and time.   Psychiatric:         Mood and Affect: Mood normal.         Behavior: Behavior is cooperative.           LABS:      In office UA today was clear of active infection and blood.     The PVR in the office today done immediately after urination by the nurse was 62.       Lab Results   Component Value Date    PSA 6.3 (H) 10/31/2019    PSA 6.1 (H) 01/16/2017    PSA 4.5 (H) 02/21/2014    PSADIAG 9.3 (H) 01/29/2024    PSADIAG 11.1 (H) 08/25/2023    PSADIAG 10.7 (H) 08/07/2023       Lab Results   Component Value Date    CREATININE 0.7 08/28/2023    EGFRNORACEVR >60.0 08/28/2023             IMPRESSION:    Encounter Diagnoses   Name Primary?    Lower urinary tract symptoms (LUTS) Yes    Prostate cancer     ED (erectile dysfunction) of organic origin     Urinary frequency     Weak urinary stream          Assessment:       1. Lower urinary tract symptoms (LUTS)    2. Prostate cancer    3. ED (erectile dysfunction) of organic origin    4. Urinary frequency    5. Weak urinary stream        Plan:         I spent 30 minutes with the patient of which more than half was spent in direct consultation with the patient in regards to our treatment  and plan.  We addressed the office findings and recent labs.   Education and recommendations of today's plan of care including home remedies and needed follow up with PCP.   We discussed the chief complaint; reviewed the LUTS and the possible contributory factors.   Reassurance no infection and emptying bladder  Reviewed management  Recommended lifestyle modifications with a proper, healthy diet, good hydration but during the day. Reducing bladder irritants.   Benefits of regular exercise.  Rx for Rapaflo 4mg; supposed to have less se than flomax.  This is only 1/2 dose; 8mg daily is max. Expectations, risks reviewed. Voiced understanding

## 2024-02-29 ENCOUNTER — CLINICAL SUPPORT (OUTPATIENT)
Dept: REHABILITATION | Facility: HOSPITAL | Age: 74
End: 2024-02-29
Payer: COMMERCIAL

## 2024-02-29 DIAGNOSIS — G89.29 CHRONIC LEFT SHOULDER PAIN: Primary | ICD-10-CM

## 2024-02-29 DIAGNOSIS — M25.512 CHRONIC LEFT SHOULDER PAIN: Primary | ICD-10-CM

## 2024-02-29 PROCEDURE — 97140 MANUAL THERAPY 1/> REGIONS: CPT | Performed by: PHYSICAL THERAPIST

## 2024-02-29 PROCEDURE — 97112 NEUROMUSCULAR REEDUCATION: CPT | Performed by: PHYSICAL THERAPIST

## 2024-02-29 PROCEDURE — 97530 THERAPEUTIC ACTIVITIES: CPT | Performed by: PHYSICAL THERAPIST

## 2024-02-29 NOTE — PROGRESS NOTES
[facility]   Physical Therapy Treatment Note      Name: Raffy Rutherford Jr.  Clinic Number: [id number]    Therapy Diagnosis:   Encounter Diagnosis   Name Primary?    Chronic left shoulder pain Yes     Physician: Davin Castrejon DO    Visit Date: 2/29/2024    Physician Orders: PT Eval and Treat   Medical Diagnosis from Referral: M67.922 (ICD-10-CM) - Biceps tendinopathy, left   Evaluation Date: 11/28/2023  Authorization Period Expiration: 11/27/2024  Plan of Care Expiration: 3/28/2024  Progress Note Due: 1/1/2024  Visit # / Visits authorized: 12/20  Total visits: 15  FOTO: 1/1    PTA Visit #: 0/5     Time In: 1000  Time Out: 1100  Total Billable Time: 60 minutes    Subjective     Pt reports: I was pain free for 5 days but now I have some new discomfort on top and the side of my arm    He was compliant with home exercise program.  Response to previous treatment: no change  Functional change: no changes    Pain: 5/10  Location: left shoulder      Objective      Objective Measures updated at progress report unless specified.     Treatment     Raffy received the treatments listed below:      therapeutic exercises to develop strength, endurance, ROM, flexibility, posture, and core stabilization for 0 minutes including:      manual therapy techniques: Joint mobilizations and Soft tissue Mobilization were applied to the: L shoulder for 10 minutes, including:    Gr I-II oscillations  Gr III flexion   Gr III IR/ER with centering glide    neuromuscular re-education activities to improve: Balance, Coordination, Sense, Proprioception, and Posture for 35 minutes. The following activities were included:    Serratus press 3# 2 x 10  SLR 3 x 10  Supine abd bracing with marching 2 x 10  Scaptions 1# 3 x 10  Sidelying ER 1# 3 x 15  No money OTB 2 x 15  Shoulder extensions GTB 2  x10    NT  CC pull down 7# 3 x 15  CC scaption 3# 3 x 12  Serratus press yellow sport cord 2 x 8  LAQ 2.5# 3 x 15  Supine IR/ER 2#  "controlled at 45 deg abd   Seated ER OTB 2 x 15  -super set LAQ 2#   Sidelying hip abd 2 x 15  -sidelying ER 2 x 15  Seated ankle dorsiflexion 2 x 10  No money OTB 2 x 10 3"  Supine T OTB 2 x 10  Sidelying flexion dowel 3 x 15  Sidelying serratus dowel 3 x 15  Supine flexion OTB 2 x 10  IR walkouts OTB 2 x 10      therapeutic activities to improve functional performance for 15 minutes, including:    AlterG walking 70% BW 15' 1.4 mph    NT  SLR supine 2 x 10  Scaptions 1# 2 x 10  Slot machines 4# 3 x 15  Row with ER OTB 2 x 10  Bridges 2 x 15  Prone extensions 2# 3 x 12  Prone row 3# 3 x 12  Sidelying clam GTB to improve gait and balance 2 x 10 5" hold      Patient Education and Home Exercises       Education provided:   - proper scap stabilization, LE strengthening for weakness    Written Home Exercises Provided: YES. Exercises were reviewed and Raffy was able to demonstrate them prior to the end of the session.  Raffy demonstrated good understanding of the education provided. See EMR under Patient Instructions for exercises provided during therapy sessions    Assessment     Progressed with rotator cuff exercise today as this is where most his pain was stemming from today. Biceps not as tight and IR/ER pain free passively, just weakness seen with exercises    Raffy Is progressing well towards his goals.   Pt prognosis is Excellent.     Pt will continue to benefit from skilled outpatient physical therapy to address the deficits listed in the problem list box on initial evaluation, provide pt/family education and to maximize pt's level of independence in the home and community environment.     Pt's spiritual, cultural and educational needs considered and pt agreeable to plan of care and goals.     Anticipated barriers to physical therapy: none    GOALS: Short Term Goals:  4 weeks  1.Report decreased shoulder pain < / =  3/10  to increase tolerance for return to playing music(Progressing, not met)  2. Increase PROM " full motion without pain  (Progressing, not met)  3. Increased strength by 1/3 MMT grade in cuff to increase tolerance for ADL and work activities.(Progressing, not met)  4. Pt to tolerate HEP to improve ROM and independence with ADL's(Progressing, not met)     Long Term Goals: 8 weeks  1.Report decreased shoulder pain  < / =  1 /10  to increase tolerance for return to sleeping through the night(Progressing, not met)  2.Increase AROM to full motion without shoulder pain(Progressing, not met)  3.Increase strength to >/= 4/5 in cuff and scap stabilizers to increase tolerance for ADL and work activities.(Progressing, not met)  4. Pt goal: return to playing music without shoulder pain (Progressing, not met)  5. Pt will have improved gcode of CJ (20-40% limited) on FOTO shoulder in order to demonstrate true functional improvement. (Progressing, not met)    Plan     Plan of care Certification: 11/28/2023 to 3/28/2024.     Improve L scap stabilization     Corwin Merritt, PT, DPT, OCS, FAAOMPT

## 2024-03-04 ENCOUNTER — CLINICAL SUPPORT (OUTPATIENT)
Dept: REHABILITATION | Facility: HOSPITAL | Age: 74
End: 2024-03-04
Payer: COMMERCIAL

## 2024-03-04 DIAGNOSIS — G89.29 CHRONIC LEFT SHOULDER PAIN: Primary | ICD-10-CM

## 2024-03-04 DIAGNOSIS — M25.512 CHRONIC LEFT SHOULDER PAIN: Primary | ICD-10-CM

## 2024-03-04 PROCEDURE — 97140 MANUAL THERAPY 1/> REGIONS: CPT | Performed by: PHYSICAL THERAPIST

## 2024-03-04 PROCEDURE — 97530 THERAPEUTIC ACTIVITIES: CPT | Performed by: PHYSICAL THERAPIST

## 2024-03-04 PROCEDURE — 97112 NEUROMUSCULAR REEDUCATION: CPT | Performed by: PHYSICAL THERAPIST

## 2024-03-05 ENCOUNTER — OFFICE VISIT (OUTPATIENT)
Dept: OTOLARYNGOLOGY | Facility: CLINIC | Age: 74
End: 2024-03-05
Payer: COMMERCIAL

## 2024-03-05 DIAGNOSIS — H61.23 BILATERAL IMPACTED CERUMEN: Primary | ICD-10-CM

## 2024-03-05 DIAGNOSIS — F41.9 ANXIETY: ICD-10-CM

## 2024-03-05 DIAGNOSIS — H93.11 TINNITUS, RIGHT EAR: ICD-10-CM

## 2024-03-05 DIAGNOSIS — F33.1 MODERATE EPISODE OF RECURRENT MAJOR DEPRESSIVE DISORDER: ICD-10-CM

## 2024-03-05 PROCEDURE — 1157F ADVNC CARE PLAN IN RCRD: CPT | Mod: CPTII,S$GLB,, | Performed by: NURSE PRACTITIONER

## 2024-03-05 PROCEDURE — 99214 OFFICE O/P EST MOD 30 MIN: CPT | Mod: 25,S$GLB,, | Performed by: NURSE PRACTITIONER

## 2024-03-05 RX ORDER — CLONAZEPAM 0.5 MG/1
TABLET ORAL
Qty: 60 TABLET | Refills: 5 | Status: SHIPPED | OUTPATIENT
Start: 2024-03-05

## 2024-03-05 NOTE — PROGRESS NOTES
Subjective:   Raffy is a 73 y.o. male who presents for routine ear cleaning. He was a prior patient of Dr. Tate and reports it has been over a year since his last cleaning. He feels some fullness in both ear, but denies any otalgia, otorrhea, ear fullness/pressure, or vertigo. He wears ear plugs every night for sleep. Feels he is hearing well, sometimes struggles with TV. Has a history of right ear intermittent tinnitus. Usually worse in the evenings. Reports its has been present for many years. Sometimes pressing on his face will change the pitch. He will use Klonopin prn for ringing-about twice weekly. Denies regular ASA/NSAID use, but does take Norco (5-325 mg) daily. Denies excessive caffeine or alcohol.     The patient's medications, allergies, past medical, surgical, social and family histories were reviewed and updated as appropriate.    A detailed review of systems was obtained with pertinent positives as per the above HPI, and otherwise negative.   Objective:     Constitutional:   He is oriented to person, place, and time. He appears well-developed and well-nourished. He appears alert. He is cooperative.  Non-toxic appearance. He does not have a sickly appearance. He does not appear ill. Normal speech.      Head:  Normocephalic and atraumatic. Not macrocephalic and not microcephalic. Head is without abrasion, without right periorbital erythema, without left periorbital erythema and without TMJ tenderness.     Ears:    Right Ear: No drainage, swelling or tenderness. No mastoid tenderness. Tympanic membrane is not scarred, not perforated, not erythematous, not retracted and not bulging. No middle ear effusion.   Left Ear: No drainage, swelling or tenderness. No mastoid tenderness. Tympanic membrane is not scarred, not perforated, not erythematous, not retracted and not bulging.  No middle ear effusion.   Ceruminous debris obstructing bilateral TMs removed under microscopy    Pulmonary/Chest:   Effort  "normal.     Psychiatric:   He has a normal mood and affect. His speech is normal and behavior is normal.     Neurological:   He is alert and oriented to person, place, and time.     Procedure  Cerumen removal performed.  See procedure note.    Procedure Note:  The patient was brought to the minor procedure room and placed under the operating microscope of the left ear canal which was cleaned of ceruminous debris. Using a combination of suction, curettes and cup forceps the patient's cerumen was removed. The patient tolerated the procedure well. There were no complications.  Procedure Note:  The patient was brought to the minor procedure room and placed under the operating microscope of the right ear canal which was cleaned of ceruminous debris. Using a combination of suction, curettes and cup forceps the patient's cerumen was removed. The patient tolerated the procedure well. There were no complications.    Audiogram  Patient does not have time for audiogram today.     Assessment:     1. Bilateral impacted cerumen    2. Tinnitus, right ear      Plan:   Bilateral impacted cerumen  Bilateral cerumen impaction removed under microscopy. Patient tolerated procedure well and noted improvement upon impaction removal. Recommend routine ear cleanings (previously [id number] per Dr. Tate). I will see him for ear cleanings.     Tinnitus, right ear  Stable. Patient seems to manage well. No new or concerning symptoms. Will obtain routine audio at next visit. Dr. Tate noted in his last note that "Pt. may follow up with Dr. AGUSTIN Ross for ear care (or LINDA Li for ear cleaning only) or Dr. MONTY Rainey for otology specialty care".     Annual audiogram and tinnitus appointment scheduled with MD per Dr. Tate's request.     "

## 2024-03-05 NOTE — TELEPHONE ENCOUNTER
Refill Routing Note   Medication(s) are not appropriate for processing by [facility] for the following reason(s):        Outside of protocol: non-delegated    [facility] action(s):  Route     Requires labs : Yes    Medication Therapy Plan:         Appointments  past 12m or future 3m with PCP    Date Provider   Last Visit   8/28/2023 Haseeb Puentes MD   Next Visit   Visit date not found Haseeb Puentes MD   ED visits in past 90 days: 0        Note composed:2:01 PM 03/05/2024

## 2024-03-05 NOTE — PROGRESS NOTES
[facility]   Physical Therapy Treatment Note      Name: Raffy Rutherford Jr.  Clinic Number: [id number]    Therapy Diagnosis:   Encounter Diagnosis   Name Primary?    Chronic left shoulder pain Yes     Physician: Davin Castrejon DO    Visit Date: 3/4/2024    Physician Orders: PT Eval and Treat   Medical Diagnosis from Referral: M67.922 (ICD-10-CM) - Biceps tendinopathy, left   Evaluation Date: 11/28/2023  Authorization Period Expiration: 11/27/2024  Plan of Care Expiration: 3/28/2024  Progress Note Due: 1/1/2024  Visit # / Visits authorized: 13/20  Total visits: 15  FOTO: 1/1    PTA Visit #: 0/5     Time In: 1205  Time Out: 1100  Total Billable Time: 65 minutes    Subjective     Pt reports: I was sore down the side of the arm but it began to go away yesterday. Leaves on Thursday for his trip and wasn't a set list of a couple things he can do.    He was compliant with home exercise program.  Response to previous treatment: no change  Functional change: no changes    Pain: 5/10  Location: left shoulder      Objective      Objective Measures updated at progress report unless specified.     Treatment     Raffy received the treatments listed below:      therapeutic exercises to develop strength, endurance, ROM, flexibility, posture, and core stabilization for 0 minutes including:      manual therapy techniques: Joint mobilizations and Soft tissue Mobilization were applied to the: L shoulder for 10 minutes, including:    Gr I-II oscillations  Gr III flexion   Gr III IR/ER with centering glide    neuromuscular re-education activities to improve: Balance, Coordination, Sense, Proprioception, and Posture for 35 minutes. The following activities were included:    Serratus press 3# 2 x 10  SLR 3 x 10 -notes back pain on R  Supine abd bracing with marching 2 x 10  Prone extensions 2# 2  x10 cue scap set start  Seated LAQ no weight 30  Slot machine 4# 3 x 10    NT  Scaptions 1# 3 x 10  Sidelying ER 1# 3 x  "15  No money OTB 2 x 15  Shoulder extensions GTB 2  x10  CC pull down 7# 3 x 15  CC scaption 3# 3 x 12  Serratus press yellow sport cord 2 x 8  LAQ 2.5# 3 x 15  Supine IR/ER 2# controlled at 45 deg abd   Seated ER OTB 2 x 15  -super set LAQ 2#   Sidelying hip abd 2 x 15  -sidelying ER 2 x 15  Seated ankle dorsiflexion 2 x 10  No money OTB 2 x 10 3"  Supine T OTB 2 x 10  Sidelying flexion dowel 3 x 15  Sidelying serratus dowel 3 x 15  Supine flexion OTB 2 x 10  IR walkouts OTB 2 x 10      therapeutic activities to improve functional performance for 15 minutes, including:    AlterG walking 70% BW 15' 1.4 mph    NT  SLR supine 2 x 10  Scaptions 1# 2 x 10  Slot machines 4# 3 x 15  Row with ER OTB 2 x 10  Bridges 2 x 15  Prone extensions 2# 3 x 12  Prone row 3# 3 x 12  Sidelying clam GTB to improve gait and balance 2 x 10 5" hold      Patient Education and Home Exercises       Education provided:   - proper scap stabilization, LE strengthening for weakness    Written Home Exercises Provided: YES. Exercises were reviewed and Raffy was able to demonstrate them prior to the end of the session.  Raffy demonstrated good understanding of the education provided. See EMR under Patient Instructions for exercises provided during therapy sessions    Assessment     Returned with soreness after loading his rotator cuff. Avoided increased loading on his cuff today, focus on scap stabilizations. Will send over quick HEP to perform while on trip to [facility]    Raffy Is progressing well towards his goals.   Pt prognosis is Excellent.     Pt will continue to benefit from skilled outpatient physical therapy to address the deficits listed in the problem list box on initial evaluation, provide pt/family education and to maximize pt's level of independence in the home and community environment.     Pt's spiritual, cultural and educational needs considered and pt agreeable to plan of care and goals.     Anticipated barriers to physical " therapy: none    GOALS: Short Term Goals:  4 weeks  1.Report decreased shoulder pain < / =  3/10  to increase tolerance for return to playing music(Progressing, not met)  2. Increase PROM full motion without pain  (Progressing, not met)  3. Increased strength by 1/3 MMT grade in cuff to increase tolerance for ADL and work activities.(Progressing, not met)  4. Pt to tolerate HEP to improve ROM and independence with ADL's(Progressing, not met)     Long Term Goals: 8 weeks  1.Report decreased shoulder pain  < / =  1 /10  to increase tolerance for return to sleeping through the night(Progressing, not met)  2.Increase AROM to full motion without shoulder pain(Progressing, not met)  3.Increase strength to >/= 4/5 in cuff and scap stabilizers to increase tolerance for ADL and work activities.(Progressing, not met)  4. Pt goal: return to playing music without shoulder pain (Progressing, not met)  5. Pt will have improved gcode of CJ (20-40% limited) on FOTO shoulder in order to demonstrate true functional improvement. (Progressing, not met)    Plan     Plan of care Certification: 11/28/2023 to 3/28/2024.     Improve L scap stabilization     Corwin Merritt, PT, DPT, OCS, FAAOMPT

## 2024-03-05 NOTE — TELEPHONE ENCOUNTER
Care Due:                  Date            Visit Type   Department     Provider  --------------------------------------------------------------------------------                                MYCHART                              ANNUAL                              CHECKUP/PHY  [facility] INTERNAL  Last Visit: 08-      [facility]       Haseeb Puentes  Next Visit: None Scheduled  None         None Found                                                            Last  Test          Frequency    Reason                     Performed    Due Date  --------------------------------------------------------------------------------    Vitamin D...  12 months..  ergocalciferol...........  Not Found    Overdue    [employer] Embedded Care Due Messages. Reference number: [id number].   3/05/2024 1:21:18 PM CST

## 2024-03-06 ENCOUNTER — PATIENT MESSAGE (OUTPATIENT)
Dept: SPORTS MEDICINE | Facility: CLINIC | Age: 74
End: 2024-03-06
Payer: COMMERCIAL

## 2024-03-06 ENCOUNTER — PROCEDURE VISIT (OUTPATIENT)
Dept: NEUROLOGY | Facility: CLINIC | Age: 74
End: 2024-03-06
Payer: COMMERCIAL

## 2024-03-06 ENCOUNTER — PATIENT MESSAGE (OUTPATIENT)
Dept: ADMINISTRATIVE | Facility: OTHER | Age: 74
End: 2024-03-06
Payer: COMMERCIAL

## 2024-03-06 VITALS
SYSTOLIC BLOOD PRESSURE: 135 MMHG | HEART RATE: 60 BPM | BODY MASS INDEX: 26.88 KG/M2 | WEIGHT: 176.81 LBS | DIASTOLIC BLOOD PRESSURE: 85 MMHG

## 2024-03-06 DIAGNOSIS — M79.18 CERVICAL MYOFASCIAL PAIN SYNDROME: ICD-10-CM

## 2024-03-06 DIAGNOSIS — G43.709 CHRONIC MIGRAINE WITHOUT AURA WITHOUT STATUS MIGRAINOSUS, NOT INTRACTABLE: Primary | ICD-10-CM

## 2024-03-06 DIAGNOSIS — G43.909 MIGRAINE WITHOUT STATUS MIGRAINOSUS, NOT INTRACTABLE, UNSPECIFIED MIGRAINE TYPE: ICD-10-CM

## 2024-03-06 PROCEDURE — 64615 CHEMODENERV MUSC MIGRAINE: CPT | Mod: S$GLB,,, | Performed by: NURSE PRACTITIONER

## 2024-03-06 PROCEDURE — 99214 OFFICE O/P EST MOD 30 MIN: CPT | Mod: 25,S$GLB,, | Performed by: NURSE PRACTITIONER

## 2024-03-06 RX ORDER — BUTALBITAL, ACETAMINOPHEN AND CAFFEINE 50; 325; 40 MG/1; MG/1; MG/1
TABLET ORAL
Qty: 30 TABLET | Refills: 0 | Status: CANCELLED | OUTPATIENT
Start: 2024-03-06

## 2024-03-06 RX ORDER — METHOCARBAMOL 750 MG/1
750 TABLET, FILM COATED ORAL 3 TIMES DAILY
Qty: 60 TABLET | Refills: 2 | Status: SHIPPED | OUTPATIENT
Start: 2024-03-06

## 2024-03-06 RX ORDER — BUTALBITAL, ACETAMINOPHEN AND CAFFEINE 50; 325; 40 MG/1; MG/1; MG/1
TABLET ORAL
Qty: 15 TABLET | Refills: 0 | Status: SHIPPED | OUTPATIENT
Start: 2024-03-06

## 2024-03-06 RX ORDER — FREMANEZUMAB-VFRM 225 MG/1.5ML
225 INJECTION SUBCUTANEOUS
Qty: 1.5 ML | Refills: 5 | Status: SHIPPED | OUTPATIENT
Start: 2024-03-06 | End: 2024-05-23 | Stop reason: SDUPTHER

## 2024-03-06 NOTE — PROCEDURES
SUBJECTIVE:  Patient ID: Raffy Rutherford Jr.  Chief Complaint: Follow-up and Botulinum Toxin Injection    History of Present Illness:  Raffy Rutherford Jr. is a 73 y.o. male who presents to clinic alone for follow-up of headaches and Botox injections.     Recommendations made at last Office Visit on 11/9/23:  - Botox administered in clinic for Chronic Migraine (see below)   - For migraine prevention - continue ajovy 225 mg SQ monthly   - For acute migraine - ubrelvy 100 mg   - Trial lidocaine NS for treatment of acute migraines   - Triptans contraindicated given daily MAOI transdermal patch   - For rescue - has fioricet available  - RTC in 12 weeks for repeat Botox injections or sooner if needed     03/06/2024- Interval History:  Migraines had been well controlled until about 6 weeks ago when migraines gradually began to return.  Of note, he is currently 5 weeks overdue for his Botox.  Scents continue to be a significant migraine trigger for him, triggers a migraine to begin behind his eyes and eventually extend towards the occipitalis/traps.  More recently has noticed migraines which start with pain in the left trap, pain will eventually extend into the left occipitalis, temporalis, retro-orbitalis.  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  Patient has continued to achieve a greater than 50% reduction in the frequency of their migraines with continued Botox injections.  Wishes to proceed with today's injections as scheduled.     Otherwise, information below is still accurate and current.     11/09/2023- Interval History:  Has found perfume scents continue to be a significant trigger for him, as well as lack of sleep.  Patient has continued to achieve a greater than 50% reduction in the frequency of their migraines with continued Botox injections.  Wishes to proceed with today's injections as scheduled.      Otherwise, information below is still accurate and current.      08/24/2023- Interval  History:  Migraines continue to be largely well controlled with combination Botox and Ajovy, he is currently three weeks overdue for his Botox and has had more frequent headaches/migraines during this time, indicating to him Botox is effective.  He has not taken any doses fioricet since last visit.  The Botox injections have achieved well over 50%  improvement in the patient's symptoms. Migraines have been reduced at least 7 days per month and the number of cumulative hours suffering with headaches has been reduced at least 100 total hours per month. Today he does have a headache indicating that the Botox has worn off. Frequency of treatment is every 12 weeks unless no response to the treatments, at which time we will discontinue the injections.     Otherwise, information below is still accurate and current.      05/08/2023- Interval History:  Has seen a noticeable improvement in his migraines since starting Botox injections, was down to 1-2 migraines per week, had a few weeks with no migraines.  Migraines were easily managed with over the counter medications.  Began to notice an uptick in migraine frequency 3 weeks ago, sent message via patient portal, was restarted on ajovy 225 mg SQ monthly.  Feels migraines have leveled out since restarting ajovy.  Denies presence of side effects to Botox, is pleased with the results from first round of Botox, does wish to proceed with second round of Botox as scheduled today.      Otherwise, information below is still accurate and current.      02/13/2023- Interval History:  Headaches continue to occur on a near daily basis with full blown migraines on 12-15 days per month.  He started Ajovy and recently injected third injection, unsure whether or not ajovy was helping.  Seen by outside pain management provider who recommended and prescribed qulipta 10 mg daily, dose increased to 30 mg daily.  For a few days after increasing dose to 30 mg, began to experience issues with  insomnia, unsure if this was related to qulipta vs ajovy or combination, but he does feel migraines were better after increasing dose to 30 mg.    Risks, Benefits, and potential side effects of Botox discussed with patient.  Alternative treatments offered.  After thorough discussed regarding the procedure, patient has decided to move forward with initiating Botox injections for Chronic Migraine.  Patient understands we will complete 3 rounds of injections, if after 3 rounds, we do not see a 50% reduction in headaches, we will discontinue Botox injections.      Otherwise, information below is still accurate and current.      History of Present Illness:   72 y.o. male with headaches, chronic LBP, cervical radiculopathy, anxiety, depression, WINNIE, hx of prostate cancer, who presents for evaluation of headaches via virtual visit.  Patient is established with neurology clinic, previous patient of DESTINY Norman and Dr. Millan, he is a new patient to me.    Currently suffering with headaches at least 1-2 times per week each of which last 2-4 days in duration.  Long history of headahces, beginning around 20 yo, had multiple sinus surgeries thinking headaches were sinus related, no improvement.       Headaches are described as a moderate to severe, stabbing/searing pain beginning left retro-orbitalis, extending towards the occipitalis and into his shoulder/left arm/hand.  Headaches can last anywhere from an hour when successfully treated with fioricet or excedrin, but can last up to 4 days in duration, on average headaches are lasting about a day in duration.  Headaches are often present upon waking.   Associated symptoms include fatigue, crepitus in left shoulder, congested (on left side), more trouble concentrating than norm, photophobia, phonophobia.  Currently experiencing some sort of headache on 20-25/30 days, with migraines occurring on 12-15/30 days.    At last visit with DESTINY Norman, she restarted nurtec 75 mg odt and referred  him to me for consideration of Botox. Nurtec every other day was ineffective (though was effective in the past for him).  He was then switched to ajovy 225 mg SQ monthly, was waiting for today's visit to decide whether or not to start using ajovy.   Takes lyrica as prescribed by outside pain management, for chronic back pain.   He is prescribed lamotrigine 200 mg daily for anxiety/depression.  Takes trazodone 50 mg nightly for insomnia.      Notes from DESTINY Norman:  HPI:   Mr. Raffy Rutherford Jr. is a 72 y.o. male presenting for evaluation regarding chronic low back pain. On chart review he was previously seen by Dr. Millan for both bilateral neuropathy and headache pain. He received an EMG with Dr. Millan on 03/15/2022 which showed evidence of L5/S1 radiculopahties, worse on the right; and moderate chronic reinnervation changes noted in the R vastus medialis. His current therapy regimen is Lyrica 200mg QHS. He notes he was previously seen by neurosurgery and instructed to receive an MRI lumbar spine by Dr. Caldwell but was unsure why this was ordered. We discussed that this is likely due to the evidence on the EMG indicating his bilateral foot drop may be due to a lumbar spine issue at L5/S1 level.      Headache History:   Onset- Teenager   Frequency- 5-6 per week  Duration- All day   Location- L eye and radiates to his L jaw and then toward his L shoulder and arm  Associated symptoms- Nasal blockage on the L side, + photophobia,   Denies any aura, denies nausea/vomiting   Alleviating Factors- Half Fioricet can occasionally relieve his headaches  Aggravating Factors- Smells trigger his headaches and stress/ poor sleep, weather      Of note- patient went to [place] and found in the 3 weeks he was there he only had 2-3 headaches which were easily relieved     He states that he is also concerned regarding his headaches for which he is currently taking Ubrelvy QOD and Fioricet for abortive therapy.       Headache:  Type: iron hot  stab like pain   Severity: 9/10  Location: left eye, forehead , cheek into left shoulder and into the back   Triggers: Particular scents, looking at screen for a long time   Frequency: worse in the last week, but otherwise 8-9/month   Duration:  Days if he does not take medication (2-3 days) can have a cycles of headaches for several weeks , sometimes can go 4 weeks without headache   Aura: none   Photophobia: not as much but does endorse turning the lights off or worsening of headache when he looks at his phone  Phonophobia: ++  Nausea/ vomiting: rarely gets nauseous   Aggravating factors: none   Relieving factors: Medications  Inhales an oil- mydab which has given him significant benefit  Fioricet- none   Ubrelvy- worked okay but Nurtec works better  Voltaren- he takes this for joint pains.   Excedrin- none   Aspirin makes tinnitus worse   Norco- 3 times a day - 1/2 pill Norco in am , 1/2 pill in the afternoon 1/2 + 3/4th pill in the evening. Pain worsens when he tries to taper off his pain medication  He snores at night, he has sleep apnea, he was on CPAP but not very compliant because he states he took the mask off in his sleep. He has tried different masks with no benefit.      Treatments Tried and Response  Nurtec  lyrica  Atogepant  Baclofen   Fioricet  Celebrex  Emgality 09/08/2020 failed  Aimovig  Fioricet   Diamox   Cymbalta   Klonopin   Valium   [place]  Lamictal   Gabapentin   Indometacin   Dilaudid  Verapamil  Nurtec   Ubrelvy   Trazodone  Ajovy   Botox - helping after first round  Qulipta 60 mg - no, caused insomnia   Ajovy - helping     Current Medications:    Current Outpatient Medications:     atorvastatin (LIPITOR) 40 MG tablet, Take 1 tablet (40 mg total) by mouth once daily., Disp: 90 tablet, Rfl: 1    celecoxib (CELEBREX) 100 MG capsule, Take 2 capsules (200 mg total) by mouth 2 (two) times daily., Disp: 180 capsule, Rfl: 3    clindamycin (CLEOCIN) 150 MG capsule, Take 4 capsules (600 mg total)  by mouth 1 hour prior to dental appointment., Disp: 12 capsule, Rfl: 1    clonazePAM (KLONOPIN) 0.5 MG tablet, Take 1 tablet by mouth twice a day as needed for anxiety, Disp: 60 tablet, Rfl: 5    doxycycline (VIBRAMYCIN) 100 MG Cap, Take 1 capsule (100 mg total) by mouth twice daily for 2 days, THEN take 1 capsule (100 mg total) by mouth once daily for 28 days., Disp: 32 capsule, Rfl: 1    ergocalciferol (VITAMIN D2) 50,000 unit Cap, Take 1 capsule (50,000 Units total) by mouth every 7 days., Disp: 12 capsule, Rfl: 3    HYDROcodone-acetaminophen (NORCO) 5-325 mg per tablet, Take 1 tablet by mouth every 8 (eight) hours as needed for Pain., Disp: 90 tablet, Rfl: 0    HYDROcodone-acetaminophen (NORCO) 5-325 mg per tablet, Take 1 tablet by mouth four times daily as needed for 30 days, Disp: 120 tablet, Rfl: 0    HYDROcodone-acetaminophen (NORCO) 5-325 mg per tablet, Take 1 tablet by mouth 4 (four) times daily as needed., Disp: 120 tablet, Rfl: 0    HYDROcodone-acetaminophen (NORCO) 5-325 mg per tablet, Take 1 tablet by mouth 4 (four) times daily., Disp: 120 tablet, Rfl: 0    hydrocortisone-pramoxine (ANALPRAM-HC) 2.5-1 % Crea, Place rectally 3 (three) times daily., Disp: 30 g, Rfl: 2    lamoTRIgine (LAMICTAL) 200 MG tablet, Take 1 tablet (200 mg total) by mouth once daily., Disp: 90 tablet, Rfl: 3    perfluorohexyloctane, PF, 100 % Drop, Place 1 drop into both eyes 4 (four) times daily., Disp: 3 mL, Rfl: 11    pregabalin (LYRICA) 225 MG Cap, Take 1 capsule (225 mg total) by mouth every evening., Disp: 93 capsule, Rfl: 3    pregabalin (LYRICA) 25 MG capsule, Take 2 capsules (50 mg total) by mouth once daily., Disp: 90 capsule, Rfl: 1    pregabalin (LYRICA) 25 MG capsule, Take 1 capsule (25 mg total) by mouth 2 (two) times a day., Disp: 180 capsule, Rfl: 0    pregabalin (LYRICA) 25 MG capsule, Take 1 capsule by mouth twice daily, Disp: 180 capsule, Rfl: 0    pregabalin (LYRICA) 25 MG capsule, Take 1 capsule (25 mg total)  by mouth 2 (two) times a day., Disp: 180 capsule, Rfl: 0    pregabalin (LYRICA) 75 MG capsule, Take 3 capsules by mouth at night, Disp: 90 capsule, Rfl: 1    pregabalin (LYRICA) 75 MG capsule, Take 3 capsules by mouth at night, Disp: 270 capsule, Rfl: 0    pregabalin (LYRICA) 75 MG capsule, Take 3 capsules (225 mg total) by mouth every evening., Disp: 270 capsule, Rfl: 0    RABEprazole (ACIPHEX) 20 mg tablet, Take 1 tablet (20 mg total) by mouth 2 (two) times daily., Disp: 180 tablet, Rfl: 3    selegiline (EMSAM) 12 mg/24 hr, Place 1 patch onto the skin once daily., Disp: 30 patch, Rfl: 0    sildenafiL (VIAGRA) 100 MG tablet, Take 1 tablet (100 mg total) by mouth as needed for Erectile Dysfunction (take 30-60 minutes before on empty stomach). Take one hour before., Disp: 4 tablet, Rfl: 12    silodosin (RAPAFLO) 4 mg Cap capsule, Take 1 capsule (4 mg total) by mouth once daily., Disp: 30 capsule, Rfl: 11    tadalafiL (CIALIS) 10 MG tablet, Take 1 tablet (10 mg total) by mouth daily as needed for Erectile Dysfunction., Disp: 30 tablet, Rfl: 11    traZODone (DESYREL) 50 MG tablet, Take 1 tablet (50 mg total) by mouth every evening., Disp: 90 tablet, Rfl: 1    UNABLE TO FIND, medication name: lidocaine 4% NS, Disp: , Rfl:     varenicline (TYRVAYA) 0.03 mg/spray sprm, 1 spray by Each Nostril route 2 (two) times a day., Disp: 8.4 mL, Rfl: 11    butalbital-acetaminophen-caffeine -40 mg (FIORICET, ESGIC) -40 mg per tablet, Take 1 tablet by mouth daily as needed for 30 days., Disp: 15 tablet, Rfl: 0    fremanezumab-vfrm (AJOVY SYRINGE) 225 mg/1.5 mL injection, Inject 1.5 mLs (225 mg total) into the skin every 28 days., Disp: 1.5 mL, Rfl: 5    methocarbamoL (ROBAXIN) 750 MG Tab, Take 1 tablet (750 mg total) by mouth 3 (three) times daily., Disp: 60 tablet, Rfl: 2    ubrogepant (UBRELVY) 100 mg tablet, Take 1 tablet (100 mg total) by mouth every 2 (two) hours as needed for Migraine. Max 200 mg/day., Disp: 16  tablet, Rfl: 5    Current Facility-Administered Medications:     onabotulinumtoxina injection 200 Units, 200 Units, Intramuscular, q12 weeks, Macie Pearson FNP, 200 Units at 03/06/24 1120    Review of Systems - A review of 10+ systems was conducted with pertinent positive and negative findings documented in HPI with all other systems reviewed and negative.    PFSH: Past medical, family, and social history reviewed as documented in chart with pertinent positive medical, family, and social history detailed in HPI.    OBJECTIVE:  Vitals:  /85   Pulse 60   Wt 80.2 kg (176 lb 12.9 oz)   BMI 26.88 kg/m²     Physical Exam:  Constitutional: he appears well-developed and well-nourished. he is well groomed. NAD     Review of Data:   Notes from rheumatology, ENT reviewed.   Labs:  Lab Visit on 01/29/2024   Component Date Value Ref Range Status    PSA Diagnostic 01/29/2024 9.3 (H)  0.00 - 4.00 ng/mL Final    Cholesterol 01/29/2024 188  120 - 199 mg/dL Final    Triglycerides 01/29/2024 253 (H)  30 - 150 mg/dL Final    HDL 01/29/2024 59  40 - 75 mg/dL Final    LDL Cholesterol 01/29/2024 78.4  63.0 - 159.0 mg/dL Final    HDL/Cholesterol Ratio 01/29/2024 31.4  20.0 - 50.0 % Final    Total Cholesterol/HDL Ratio 01/29/2024 3.2  2.0 - 5.0 Final    Non-HDL Cholesterol 01/29/2024 129  mg/dL Final     Imaging:  No results found. However, due to the size of the patient record, not all encounters were searched. Please check Results Review for a complete set of results.    Note: I have independently reviewed any/all imaging/labs/tests and agree with the report (s) as documented.  Any discrepancies will be as noted/demarcated by free text.  GARO BERGER 3/6/2024    ASSESSMENT:  1. Chronic migraine without aura without status migrainosus, not intractable    2. Migraine without status migrainosus, not intractable, unspecified migraine type    3. Cervical myofascial pain syndrome      PLAN:  - Botox administered in clinic for  Chronic Migraine (see below)   - For migraine prevention - continue ajovy 225 mg SQ monthly   - For acute migraine - ubrelvy 100 mg   - Triptans contraindicated given daily MAOI transdermal patch   - For rescue - has fioricet available  - Given robaxin 750 mg to take up to three times daily as needed for muscle spasm  - RTC in 12 weeks for repeat Botox injections or sooner if needed     Orders Placed This Encounter    Prior authorization Order    methocarbamoL (ROBAXIN) 750 MG Tab    fremanezumab-vfrm (AJOVY SYRINGE) 225 mg/1.5 mL injection    ubrogepant (UBRELVY) 100 mg tablet    butalbital-acetaminophen-caffeine -40 mg (FIORICET, ESGIC) -40 mg per tablet     All questions and concerns addressed.  Patient verbalizes understanding and is agreeable with the above stated treatment plan.      PROCEDURE NOTE:  BOTOX was performed as an indicated therapy for intractable chronic migraine headaches given that the patient failed more than 2 headache medications    A time out was conducted just before the start of the procedure to verify the correct patient and procedure, procedure location, and all relevant critical information.     The Botox injections have achieved well over 50%  improvement in the patient's symptoms. Migraines have been reduced at least 7 days per month and the number of cumulative hours suffering with headaches has been reduced at least 100 total hours per month. Today she does have a headache indicating that the Botox has worn off. Frequency of treatment is every 12 weeks unless no response to the treatments, at which time we will discontinue the injections.    PROCEDURE PERFORMED: Botulinum toxin injection (96844)  CLINICAL INDICATION: G43.719  After risks and benefits were explained including bleeding, infection, worsening of pain, damage to the areas being injected, weakness of muscles, loss of muscle control, dysphagia if injecting the head or neck, facial droop if injecting the facial  area, painful injection, allergic or other reaction to the medications being injected, and the failure of the procedure to help the problem, a signed consent was obtained.   The patient was placed in a comfortable area and the sites to be treated were identified.The area to be treated was prepped three times with alcohol and the alcohol allowed to dry. Next, a 30 gauge needle was used to inject the medication in the area to be treated.      Total Botox used: 155 Units   Unavoidable waste: 45 Units     Injection sites:    muscle bilaterally ( a total of 10 units divided into 2 sites)   Procerus muscle (5 units)   Frontalis muscle bilaterally (a total of 20 units divided into 4 sites)   Temporalis muscle bilaterally (a total of 40 units divided into 8 sites)   Occipitalis muscle bilaterally (a total of 30 units divided into 6 sites)   Cervical paraspinal muscles (a total of 20 units divided into 4 sites)   Trapezius muscle bilaterally (a total of 30 units divided into 6 sites)   Complications: none   RTC for the next Botox injection: 12 weeks     CC: Haseeb Puentes MD Elizabeth C Vulevich, FNP-JESSICA  [facility]   [phone]

## 2024-03-07 ENCOUNTER — PATIENT MESSAGE (OUTPATIENT)
Dept: REHABILITATION | Facility: HOSPITAL | Age: 74
End: 2024-03-07
Payer: COMMERCIAL

## 2024-03-07 ENCOUNTER — PATIENT MESSAGE (OUTPATIENT)
Dept: SPORTS MEDICINE | Facility: CLINIC | Age: 74
End: 2024-03-07
Payer: COMMERCIAL

## 2024-03-14 ENCOUNTER — PATIENT OUTREACH (OUTPATIENT)
Dept: OTHER | Facility: OTHER | Age: 74
End: 2024-03-14
Payer: COMMERCIAL

## 2024-03-14 ENCOUNTER — PATIENT MESSAGE (OUTPATIENT)
Dept: ADMINISTRATIVE | Facility: OTHER | Age: 74
End: 2024-03-14
Payer: COMMERCIAL

## 2024-03-14 NOTE — PROGRESS NOTES
Connected Back: Patient Outreach    Weekly QNR Escalation - Pateint was on a boat and could not complete exercises for the week. Closing out task.

## 2024-03-26 ENCOUNTER — PATIENT MESSAGE (OUTPATIENT)
Dept: ADMINISTRATIVE | Facility: OTHER | Age: 74
End: 2024-03-26
Payer: COMMERCIAL

## 2024-03-26 ENCOUNTER — PATIENT OUTREACH (OUTPATIENT)
Dept: OTHER | Facility: OTHER | Age: 74
End: 2024-03-26
Payer: COMMERCIAL

## 2024-03-26 ENCOUNTER — PATIENT MESSAGE (OUTPATIENT)
Dept: REHABILITATION | Facility: HOSPITAL | Age: 74
End: 2024-03-26
Payer: COMMERCIAL

## 2024-03-27 ENCOUNTER — PATIENT OUTREACH (OUTPATIENT)
Dept: OTHER | Facility: OTHER | Age: 74
End: 2024-03-27
Payer: COMMERCIAL

## 2024-03-27 NOTE — PROGRESS NOTES
Connected Back: Patient Outreach    Weekly QNR Escalation - Patient was unable to complete exercises due to traveling [place]. Closing out task.

## 2024-04-02 ENCOUNTER — OFFICE VISIT (OUTPATIENT)
Dept: OPHTHALMOLOGY | Facility: CLINIC | Age: 74
End: 2024-04-02
Payer: COMMERCIAL

## 2024-04-02 DIAGNOSIS — H02.886 MEIBOMIAN GLAND DYSFUNCTION (MGD) OF BOTH EYES: ICD-10-CM

## 2024-04-02 DIAGNOSIS — H02.883 MEIBOMIAN GLAND DYSFUNCTION (MGD) OF BOTH EYES: ICD-10-CM

## 2024-04-02 DIAGNOSIS — H04.123 DRY EYE SYNDROME OF BOTH EYES: ICD-10-CM

## 2024-04-02 DIAGNOSIS — H43.813 POSTERIOR VITREOUS DETACHMENT OF BOTH EYES: Primary | ICD-10-CM

## 2024-04-02 DIAGNOSIS — Z96.1 PSEUDOPHAKIA OF BOTH EYES: ICD-10-CM

## 2024-04-02 PROCEDURE — 1101F PT FALLS ASSESS-DOCD LE1/YR: CPT | Mod: CPTII,S$GLB,, | Performed by: OPHTHALMOLOGY

## 2024-04-02 PROCEDURE — 92134 CPTRZ OPH DX IMG PST SGM RTA: CPT | Mod: S$GLB,,, | Performed by: OPHTHALMOLOGY

## 2024-04-02 PROCEDURE — 92014 COMPRE OPH EXAM EST PT 1/>: CPT | Mod: S$GLB,,, | Performed by: OPHTHALMOLOGY

## 2024-04-02 PROCEDURE — 1157F ADVNC CARE PLAN IN RCRD: CPT | Mod: CPTII,S$GLB,, | Performed by: OPHTHALMOLOGY

## 2024-04-02 PROCEDURE — 92202 OPSCPY EXTND ON/MAC DRAW: CPT | Mod: 59,S$GLB,, | Performed by: OPHTHALMOLOGY

## 2024-04-02 PROCEDURE — 1160F RVW MEDS BY RX/DR IN RCRD: CPT | Mod: CPTII,S$GLB,, | Performed by: OPHTHALMOLOGY

## 2024-04-02 PROCEDURE — 1159F MED LIST DOCD IN RCRD: CPT | Mod: CPTII,S$GLB,, | Performed by: OPHTHALMOLOGY

## 2024-04-02 PROCEDURE — 3288F FALL RISK ASSESSMENT DOCD: CPT | Mod: CPTII,S$GLB,, | Performed by: OPHTHALMOLOGY

## 2024-04-02 PROCEDURE — 1125F AMNT PAIN NOTED PAIN PRSNT: CPT | Mod: CPTII,S$GLB,, | Performed by: OPHTHALMOLOGY

## 2024-04-02 PROCEDURE — 99999 PR PBB SHADOW E&M-EST. PATIENT-LVL IV: CPT | Mod: PBBFAC,,, | Performed by: OPHTHALMOLOGY

## 2024-04-02 NOTE — PROGRESS NOTES
HPI    1 mo DFE//OCTm OU//Monitor New PVD OD    DLS  by Dr STACEY Franklin MD    Cc: pt states: I still have the floater ( appearance of a large horse fly)   x 2 wks    --diplopia  -eye pain  +blurred va  --flashes/++floater OD  ++headaches   -curtain /shadows/veil      Eye Meds:  Meibo QID OD nightly                     Systane OU                      Invicta gtt    POHX:   1. Meibomian Gland Dysfunction OU   2. Dry Eyes OU   3. 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         A/P    ICD-10-CM ICD-9-CM   1. Posterior vitreous detachment of both eyes  H43.813 379.21   2. Pseudophakia of both eyes  Z96.1 V43.1   3. Dry eye syndrome of both eyes  H04.123 375.15   4. Meibomian gland dysfunction (MGD) of both eyes  H02.883 374.89    H02.886        1. Posterior vitreous detachment of both eyes  Here for floater f/u    Pt feels still seeing floaters OD but not as often     Exam today notable for PVD OU, no RT/RD     Plan: doing well, stable PVD OU, observe no RT/RD    Pathology of PVD, Retinal Tear, Retinal Detachment reviewed in great detail  RD precautions discussed in detail, patient expressed understanding  RTC immediately PRN (especially ANY change flashes, floaters, vision, visual field)     2. Pseudophakia of both eyes  Good lens position OU  Plan: Observation, update Mrx prn     3. Dry eye syndrome of both eyes  4. Meibomian gland dysfunction (MGD) of both eyes  Mild dry eye  Currently using Meibo QID  Plan: continue Meibo for now    RTC Optom annual check    I saw and examined the patient and reviewed in detail the findings documented. The final examination findings, image interpretations which have been independently interpreted, and plan as documented in the record represent  my personal judgment and conclusions.    Turner Franklin MD  Vitreoretinal Surgery   [facility]

## 2024-04-03 ENCOUNTER — PATIENT OUTREACH (OUTPATIENT)
Dept: OTHER | Facility: OTHER | Age: 74
End: 2024-04-03
Payer: COMMERCIAL

## 2024-04-03 ENCOUNTER — PATIENT MESSAGE (OUTPATIENT)
Dept: INTERNAL MEDICINE | Facility: CLINIC | Age: 74
End: 2024-04-03
Payer: COMMERCIAL

## 2024-04-03 NOTE — PROGRESS NOTES
Connected Back: Patient Outreach    Week 3:    Exercises Deleted:   - Bird Dog  -Pall off Press      Exercises Added:  - Seated Low Back Stretch  - Hamstring Stretch

## 2024-04-04 ENCOUNTER — OFFICE VISIT (OUTPATIENT)
Dept: SPORTS MEDICINE | Facility: CLINIC | Age: 74
End: 2024-04-04
Payer: COMMERCIAL

## 2024-04-04 ENCOUNTER — HOSPITAL ENCOUNTER (OUTPATIENT)
Dept: RADIOLOGY | Facility: HOSPITAL | Age: 74
Discharge: HOME | End: 2024-04-04
Attending: ORTHOPAEDIC SURGERY
Payer: COMMERCIAL

## 2024-04-04 VITALS
DIASTOLIC BLOOD PRESSURE: 73 MMHG | BODY MASS INDEX: 26.8 KG/M2 | WEIGHT: 176.81 LBS | HEIGHT: 68 IN | SYSTOLIC BLOOD PRESSURE: 117 MMHG | HEART RATE: 64 BPM

## 2024-04-04 DIAGNOSIS — M67.922 BICEPS TENDINOPATHY, LEFT: ICD-10-CM

## 2024-04-04 DIAGNOSIS — G89.29 CHRONIC LEFT SHOULDER PAIN: ICD-10-CM

## 2024-04-04 DIAGNOSIS — M75.22 BICEPS TENDINITIS OF LEFT UPPER EXTREMITY: ICD-10-CM

## 2024-04-04 DIAGNOSIS — M75.102 NONTRAUMATIC TEAR OF LEFT ROTATOR CUFF, UNSPECIFIED TEAR EXTENT: ICD-10-CM

## 2024-04-04 DIAGNOSIS — M75.112 NONTRAUMATIC INCOMPLETE TEAR OF LEFT ROTATOR CUFF: ICD-10-CM

## 2024-04-04 DIAGNOSIS — M25.512 CHRONIC LEFT SHOULDER PAIN: ICD-10-CM

## 2024-04-04 DIAGNOSIS — S43.432D SUPERIOR GLENOID LABRUM LESION OF LEFT SHOULDER, SUBSEQUENT ENCOUNTER: Primary | ICD-10-CM

## 2024-04-04 DIAGNOSIS — S43.432A SUPERIOR GLENOID LABRUM LESION OF LEFT SHOULDER, INITIAL ENCOUNTER: Primary | ICD-10-CM

## 2024-04-04 DIAGNOSIS — M94.212 CHONDROMALACIA OF LEFT SHOULDER: ICD-10-CM

## 2024-04-04 DIAGNOSIS — M19.012 OSTEOARTHRITIS OF LEFT SHOULDER, UNSPECIFIED OSTEOARTHRITIS TYPE: ICD-10-CM

## 2024-04-04 PROCEDURE — 73030 X-RAY EXAM OF SHOULDER: CPT | Mod: 26,LT,, | Performed by: RADIOLOGY

## 2024-04-04 PROCEDURE — 99215 OFFICE O/P EST HI 40 MIN: CPT | Mod: S$GLB,,, | Performed by: ORTHOPAEDIC SURGERY

## 2024-04-04 PROCEDURE — 73030 X-RAY EXAM OF SHOULDER: CPT | Mod: TC,LT

## 2024-04-04 PROCEDURE — 3078F DIAST BP <80 MM HG: CPT | Mod: CPTII,S$GLB,, | Performed by: ORTHOPAEDIC SURGERY

## 2024-04-04 PROCEDURE — 3008F BODY MASS INDEX DOCD: CPT | Mod: CPTII,S$GLB,, | Performed by: ORTHOPAEDIC SURGERY

## 2024-04-04 PROCEDURE — 99999 PR PBB SHADOW E&M-EST. PATIENT-LVL V: CPT | Mod: PBBFAC,,, | Performed by: ORTHOPAEDIC SURGERY

## 2024-04-04 PROCEDURE — 1101F PT FALLS ASSESS-DOCD LE1/YR: CPT | Mod: CPTII,S$GLB,, | Performed by: ORTHOPAEDIC SURGERY

## 2024-04-04 PROCEDURE — 3288F FALL RISK ASSESSMENT DOCD: CPT | Mod: CPTII,S$GLB,, | Performed by: ORTHOPAEDIC SURGERY

## 2024-04-04 PROCEDURE — 1159F MED LIST DOCD IN RCRD: CPT | Mod: CPTII,S$GLB,, | Performed by: ORTHOPAEDIC SURGERY

## 2024-04-04 PROCEDURE — 1125F AMNT PAIN NOTED PAIN PRSNT: CPT | Mod: CPTII,S$GLB,, | Performed by: ORTHOPAEDIC SURGERY

## 2024-04-04 PROCEDURE — 3074F SYST BP LT 130 MM HG: CPT | Mod: CPTII,S$GLB,, | Performed by: ORTHOPAEDIC SURGERY

## 2024-04-04 PROCEDURE — 1157F ADVNC CARE PLAN IN RCRD: CPT | Mod: CPTII,S$GLB,, | Performed by: ORTHOPAEDIC SURGERY

## 2024-04-04 NOTE — TELEPHONE ENCOUNTER
Pt requesting med refill at 9mg for the EMSAM pended for your review.      LOV with Haseeb Puentes MD , 8/28/2023

## 2024-04-04 NOTE — PROGRESS NOTES
CC: Left shoulder pain    Raffy Rutherford JrLuisa, presents today for follow up evaluation of his left shoulder. At last appointment, 2/19/2024, patient underwent  long head of biceps tendon sheath CSI .  This was therapeutic but not quite a therapeutic as the prior injection which resolved all of his pain.  He was doing well until on a trip he pulled himself up on a bus and felt recurrent pain over the biceps region as before.  No pop.  Prior MRI showed some low-grade rotator cuff pathology and a SLAP tear.  Prior conservative treatment has been extensive to include multiple injections, therapy and oral medication.  This has been a longstanding issue for him he would like to pursue surgery at this time for resolution.  He denies any neck or radicular symptoms.  No posterior shoulder pain.    Prior Hx 2/19/2024:   Raffy Rutherford Jr., presents today for follow up appointment of his left shoulder. He returns for repeat ultrasound guided long head of biceps tendon sheath CSI. He has a trip planned in early March. He reports some improvement overall with PT at [facility]. Patient does not report any new incidents or injuries since their last appointment.    Prior Hx 1/4/2024:   Raffy Langey ., presents today for follow up evaluation of his left shoulder. At last appointment, 11/28/2023, patient underwent  ultrasound guided long head of biceps tendon sheath CSI . Patient reports partial relief for a very brief period of time but reports he went to PT the same day as the injection so thinks he may not have given it adequate time to rest.  The shoulder remains painful - again localized primarily over the anterior proximal biceps region.  Present with repetitive overhead activity and reaching out away from himself.  Conservative care to this point has been extensive to include a total of 3 corticosteroid injections (1 GH , 2 biceps sheath).  Both the prior glenohumeral and July and the initial biceps sheath steroid  injections were of significant benefit but the most recent injection did not provide as much relief.  Prior MRI showed biceps split tearing with tendinopathy, SLAP pathology, shoulder glenohumeral chondromalacia and some low-grade partial-thickness rotator cuff tearing.  Additional conservative care has included therapy and oral medication.  At this point he feels that he likely will need surgery but does not want to proceed until perhaps later in the year.  He has a ski trip planned in March to [place] for an adaptive skiing program and would like to discuss options in terms of getting ready for that.  He is accompanied by his wife who is 1 of our wound care specialists - Valarie Sangeeta.  She works currently in the colon rectal surgery department.      Prior Hx 11/28/2023:   Raffy Rutherford ., a 73 y.o. male, presents today for follow up evaluation of his left shoulder.  Diagnosis of symptomatic SLAP tear, partial-thickness rotator cuff tear, biceps tendinitis.  Conservative care to this point.  At last appointment, 8/29/2023, patient underwent  long head of biceps tendon sheath CSI under ultrasound guidance which provided excellent near complete relief for a few months.  He would like to repeat that injection.  We have also discussed surgery in the past.  The patient would like to hold off on that.  He also would like to switch physical therapy back to [place] from [facility].  For logistical reasons.    Prior Hx 8/29/2023:   Raffy returns today for follow up of left shoulder pain. Received CSI at previous appointment, reports 100 % relief for 5-6 weeks.  Overall much better after this injection but does have now some recurrent pain and somewhat different location.  Localizes anteriorly over the proximal biceps groove.  This does bother him with activity.  He would like to discuss treatment options for this.  Going to PT with Ronald Zurita for his back and hip and OT with Vickie Perkins.  Under the care of my  colleague Dr. Kaye Solis for his right hand.    Prior Hx 7/11/23:  Raffy returns to to discuss a possible left shoulder repeat steroid injection.  I saw him for the 1st time virtually recently.  MRI results reviewed.  He has low-grade undersurface cuff tearing with some degenerative changes within the glenohumeral joint and SLAP tearing/biceps tendinopathy.  Complaining of chronic left shoulder pain. He has been seen by multiple mid-level providers over in the General Orthopedic Department.  He was given a subacromial steroid injection in been March which provided approximately 70% relief in pain for a few weeks.  Additionally he has had physical therapy at the [facility] location with Ronald Zurita.  Has taken Celebrex and Mobic a different times.  Chief complaint of ongoing left shoulder pain worse with overhead activity and after playing cello.  Today he localizes over his medial upper arm, occasionally superolateral with provocative movements.  He describes intermittent clicking and catching in the shoulder which is deep.  This is bothersome.  Denies any antecedent injury mechanism.      PMHx notable for cervical and lumbar spine DJD, bilateral foot drop  PSHx notable for left SHARAD 2012, L3-5 fusion 2015, L5-S1 fusion 2020  Negative for tobacco.   Negative for diabetes.    PAST MEDICAL HISTORY:   Past Medical History:   Diagnosis Date    Allergy     Arthritis     Back pain     after trauma beginning in 89    Cataract     Chronic pain     neck and left shoulder    Cluster headache 2013    Colon polyps 2007 2007-2019: TA x5, HP x3    Degenerative disc disease     Depression     Diverticulitis 12/2013    Dry eye syndrome     Fibromyalgia 2013    GERD (gastroesophageal reflux disease)     Hepatitis 1970's    A    History of prostate biopsy 2002    Hyperlipidemia     Joint pain     Prostate cancer 07/2021    PVD (posterior vitreous detachment)     Salzmann's nodular dystrophy of left eye     Sleep apnea     Thyroid  nodule 07/16/2014    Trigeminal neuralgia of left side of face     Tubular adenoma of colon 2007    5 removed [id number]    Visual disturbance 2012    problems after cataract surgery     PAST SURGICAL HISTORY:  Past Surgical History:   Procedure Laterality Date    BACK SURGERY      CARPAL TUNNEL RELEASE Right 5/18/2021    Procedure: RELEASE, CARPAL TUNNEL;  Surgeon: Kaye Solis MD;  Location: [facility] ER;  Service: Orthopedics;  Laterality: Right;    CATARACT EXTRACTION W/  INTRAOCULAR LENS IMPLANT Bilateral     CHOLECYSTECTOMY      COLONOSCOPY N/A 12/17/2019    Normal - Repeat 5yrs    COLONOSCOPY  2007 2007 TA x2, 2011 TA x3, 2014 HP x3, 2019 normal    COSMETIC SURGERY  2/10/2015    Direct mid-forehead brow lift    COSMETIC SURGERY  2/10/2015    Bilateral upper lid blepahroplasty    CYSTOSCOPY WITH URETEROSCOPY, RETROGRADE PYELOGRAPHY, AND INSERTION OF STENT Left 8/19/2020    Procedure: CYSTOSCOPY, WITH RETROGRADE PYELOGRAM AND URETERAL STENT INSERTION;  Surgeon: Katelynn George MD;  Location: [facility];  Service: Urology;  Laterality: Left;    ESOPHAGOGASTRODUODENOSCOPY N/A 12/17/2019    Procedure: ESOPHAGOGASTRODUODENOSCOPY (EGD);  Surgeon: Amadou Hardin MD;  [facility] ([facility]);  Service: Endoscopy;  Laterality: N/A;    EYE SURGERY      FUSION OF LUMBAR SPINE BY ANTERIOR APPROACH N/A 8/20/2020    Procedure: FUSION, SPINE, LUMBAR, ANTERIOR APPROACH L5-S1 ALIF Stand Alone;  Surgeon: Jason Caldwell MD;  Location: [facility];  Service: Neurosurgery;  Laterality: N/A;    HEMORRHOID SURGERY      with complication of chronic bleeding for 6 weeks     INJECTION OF STEROID Right 12/6/2018    Procedure: INJECTION, STEROID Right SI Joint Block and Steroid Injection;  Surgeon: Jason Caldwell MD;  Location: [facility];  Service: Neurosurgery;  Laterality: Right;  Procedure: Right SI Joint Block and Steroid Injection  Surgery Time: 30 MIN  LOS: 0  Anesthesia: MAC  Radiology: C-arm  Bed: [place] [id number] Poster  Position:  Prone    INJECTION OF STEROID Right 9/19/2019    Procedure: INJECTION, STEROID Procedure: Right SI joint block nd steroid injection;  Surgeon: Jason Caldwell MD;  Location: [facility] OR;  Service: Neurosurgery;  Laterality: Right;  Procedure: Right SI joint block nd steroid injection  Surgery Time: 30 mins  LOS:   Anesthesia: General MAC  Radiology:C-arm  Bed: Regular Bed  Position: Prone    IRRIGATION AND DEBRIDEMENT OF UPPER EXTREMITY Left 4/7/2022    Procedure: IRRIGATION AND DEBRIDEMENT, UPPER EXTREMITY;  Surgeon: Kaye Solis MD;  Location: [facility] OR;  Service: Orthopedics;  Laterality: Left;    JOINT REPLACEMENT      KNEE SURGERY      involving arthroscopic surgery to both knees    REPAIR OF EXTENSOR TENDON Left 4/7/2022    Procedure: REPAIR, TENDON, EXTENSOR thumb, EPB and EPL;  Surgeon: Kaye Solis MD;  Location: [facility] OR;  Service: Orthopedics;  Laterality: Left;    SINUS SURGERY      left molar and sinus surgery for trigeminal neuralgia    SPINAL FUSION  06/22/2015    L3-L5 XLIF/TANA    TOTAL HIP ARTHROPLASTY  4/2012    Pt states he had total hip replacement on his left hip.    ULNAR NERVE TRANSPOSITION Left 12/16/2020    Procedure: TRANSPOSITION, NERVE, ULNAR - left carpal and cubital tunnel releases;  Surgeon: Addi Bauer MD;  Location: [facility] OR;  Service: Orthopedics;  Laterality: Left;     FAMILY HISTORY:  Family History   Problem Relation Age of Onset    Stroke Mother 86    Colon cancer Mother         early/mid-60s    Uterine cancer Mother 77    Pancreatitis Brother         acute, now s/p nathalia    Diabetes Brother     Macular degeneration Brother     Other Brother         foot drop    Other Father 44        pituitary tumor- CA vs benign?    Heart attack Maternal Grandfather         suspected, 50s    Migraines Sister     Crohn's disease Sister         w/ assoc joint issues    Arthritis Sister     Tremor Daughter     Irritable bowel syndrome Son         leaning toward Crohn's dx    Neurological  Disorders Son         nonspecific, benign fasciculations of calves    Tremor Son     Jaundice Grandchild     Other Maternal Uncle         memory issue    Other Maternal Uncle         memory issue    Cancer Maternal Cousin         origin? maybe thyroid? 40s?    Melanoma Neg Hx     Amblyopia Neg Hx     Blindness Neg Hx     Cataracts Neg Hx     Glaucoma Neg Hx     Hypertension Neg Hx     Retinal detachment Neg Hx     Strabismus Neg Hx     Thyroid disease Neg Hx     Allergic rhinitis Neg Hx     Allergies Neg Hx     Angioedema Neg Hx     Asthma Neg Hx     Eczema Neg Hx     Urticaria Neg Hx     Rhinitis Neg Hx     Immunodeficiency Neg Hx     Atopy Neg Hx      MEDICATIONS:    Current Outpatient Medications:     atorvastatin (LIPITOR) 40 MG tablet, Take 1 tablet (40 mg total) by mouth once daily., Disp: 90 tablet, Rfl: 1    butalbital-acetaminophen-caffeine -40 mg (FIORICET, ESGIC) -40 mg per tablet, Take 1 tablet by mouth daily as needed for 30 days., Disp: 15 tablet, Rfl: 0    celecoxib (CELEBREX) 100 MG capsule, Take 2 capsules (200 mg total) by mouth 2 (two) times daily., Disp: 180 capsule, Rfl: 3    clindamycin (CLEOCIN) 150 MG capsule, Take 4 capsules (600 mg total) by mouth 1 hour prior to dental appointment., Disp: 12 capsule, Rfl: 1    clonazePAM (KLONOPIN) 0.5 MG tablet, Take 1 tablet by mouth twice a day as needed for anxiety, Disp: 60 tablet, Rfl: 5    doxycycline (VIBRAMYCIN) 100 MG Cap, Take 1 capsule (100 mg total) by mouth twice daily for 2 days, THEN take 1 capsule (100 mg total) by mouth once daily for 28 days., Disp: 32 capsule, Rfl: 1    ergocalciferol (VITAMIN D2) 50,000 unit Cap, Take 1 capsule (50,000 Units total) by mouth every 7 days., Disp: 12 capsule, Rfl: 3    fremanezumab-vfrm (AJOVY SYRINGE) 225 mg/1.5 mL injection, Inject 1.5 mLs (225 mg total) into the skin every 28 days., Disp: 1.5 mL, Rfl: 5    HYDROcodone-acetaminophen (NORCO) 5-325 mg per tablet, Take 1 tablet by mouth every 8  (eight) hours as needed for Pain., Disp: 90 tablet, Rfl: 0    hydrocortisone-pramoxine (ANALPRAM-HC) 2.5-1 % Crea, Place rectally 3 (three) times daily., Disp: 30 g, Rfl: 2    lamoTRIgine (LAMICTAL) 200 MG tablet, Take 1 tablet (200 mg total) by mouth once daily., Disp: 90 tablet, Rfl: 3    methocarbamoL (ROBAXIN) 750 MG Tab, Take 1 tablet (750 mg total) by mouth 3 (three) times daily., Disp: 60 tablet, Rfl: 2    perfluorohexyloctane, PF, 100 % Drop, Place 1 drop into both eyes 4 (four) times daily., Disp: 3 mL, Rfl: 11    pregabalin (LYRICA) 225 MG Cap, Take 1 capsule (225 mg total) by mouth every evening. (Patient not taking: Reported on 4/2/2024), Disp: 93 capsule, Rfl: 3    pregabalin (LYRICA) 25 MG capsule, Take 2 capsules (50 mg total) by mouth once daily. (Patient not taking: Reported on 4/2/2024), Disp: 90 capsule, Rfl: 1    pregabalin (LYRICA) 25 MG capsule, Take 1 capsule (25 mg total) by mouth 2 (two) times a day. (Patient not taking: Reported on 4/2/2024), Disp: 180 capsule, Rfl: 0    pregabalin (LYRICA) 25 MG capsule, Take 1 capsule by mouth twice daily, Disp: 180 capsule, Rfl: 0    pregabalin (LYRICA) 25 MG capsule, Take 1 capsule (25 mg total) by mouth 2 (two) times a day. (Patient not taking: Reported on 4/2/2024), Disp: 180 capsule, Rfl: 0    pregabalin (LYRICA) 75 MG capsule, Take 3 capsules by mouth at night (Patient not taking: Reported on 4/2/2024), Disp: 90 capsule, Rfl: 1    pregabalin (LYRICA) 75 MG capsule, Take 3 capsules by mouth at night, Disp: 270 capsule, Rfl: 0    pregabalin (LYRICA) 75 MG capsule, Take 3 capsules (225 mg total) by mouth every evening. (Patient not taking: Reported on 4/2/2024), Disp: 270 capsule, Rfl: 0    RABEprazole (ACIPHEX) 20 mg tablet, Take 1 tablet (20 mg total) by mouth 2 (two) times daily., Disp: 180 tablet, Rfl: 3    selegiline (EMSAM) 12 mg/24 hr, Place 1 patch onto the skin once daily., Disp: 30 patch, Rfl: 0    selegiline (EMSAM) 9 mg/24 hr, Place 1 patch  "onto the skin once daily., Disp: 30 patch, Rfl: 11    sildenafiL (VIAGRA) 100 MG tablet, Take 1 tablet (100 mg total) by mouth as needed for Erectile Dysfunction (take 30-60 minutes before on empty stomach). Take one hour before., Disp: 4 tablet, Rfl: 12    silodosin (RAPAFLO) 4 mg Cap capsule, Take 1 capsule (4 mg total) by mouth once daily., Disp: 30 capsule, Rfl: 11    tadalafiL (CIALIS) 10 MG tablet, Take 1 tablet (10 mg total) by mouth daily as needed for Erectile Dysfunction., Disp: 30 tablet, Rfl: 11    traZODone (DESYREL) 50 MG tablet, Take 1 tablet (50 mg total) by mouth every evening., Disp: 90 tablet, Rfl: 1    ubrogepant (UBRELVY) 100 mg tablet, Take 1 tablet (100 mg total) by mouth every 2 (two) hours as needed for Migraine. Max 200 mg/day., Disp: 16 tablet, Rfl: 5    UNABLE TO FIND, medication name: lidocaine 4% NS, Disp: , Rfl:     varenicline (TYRVAYA) 0.03 mg/spray sprm, 1 spray by Each Nostril route 2 (two) times a day., Disp: 8.4 mL, Rfl: 11    Current Facility-Administered Medications:     onabotulinumtoxina injection 200 Units, 200 Units, Intramuscular, q12 weeks, Macie Pearson, MIKAYLA, 200 Units at 03/06/24 1120    ALLERGIES:  Review of patient's allergies indicates:   Allergen Reactions    Alphagan [brimonidine]      Patient taking MASO-B Selective Inhibitor Selegiline (Emsam)    Coumadin [warfarin]      itch    Oxycodone      hiccups     REVIEW OF SYSTEMS:  Constitution: Negative. Negative for chills, fever and night sweats.    Hematologic/Lymphatic: Negative for bleeding problem. Does not bruise/bleed easily.   Skin: Negative for dry skin, itching and rash.   Musculoskeletal: Negative for falls. Positive for left shoulder pain and muscle weakness.     All other review of symptoms were reviewed and found to be noncontributory.    PHYSICAL EXAMINATION:  Vitals:  /73   Pulse 64   Ht 5' 8" (1.727 m)   Wt 80.2 kg (176 lb 12.9 oz)   BMI 26.88 kg/m²    General: Well-developed " well-nourished 73 y.o. malein no acute distress   Cardiovascular: Regular rhythm by palpation of distal pulse, normal color and temperature, no concerning varicosities on symptomatic side   Lungs: No labored breathing or wheezing appreciated   Neuro: Alert and oriented ×3   Psychiatric: well oriented to person, place and time, demonstrates normal mood and affect   Skin: No rashes, lesions or ulcers, normal temperature, turgor, and texture on uninvolved extremity    Ortho/SPM Exam  Examination of the left shoulder again demonstrates active forward elevation to 160, ER with arm at side to 60, IR to T10.  Intact overhead passive range of motion.  Prominent pain over the proximal biceps groove.  No pain to palpation over Codman's point the AC joint and posterior glenohumeral joint line.  Positive modified speed's test.  Minimal tenderness to palpation over the posterior glenohumeral joint line. 5- out of 5 resisted scaption without pain.  5/5 resisted external rotation with arm at side.  Mildly positive glenohumeral grind test.  No scapular winging.  Intact cervical neck range of motion.    IMAGING:  Xrays including AP, Outlet and Axillary Lateral of Left shoulder are ordered / images reviewed by me:   No significant glenohumeral degenerative changes.    Prior MRI left shoulder 5/2023:  1. Supraspinatus tendinosis with articular surface fraying and a small, low-grade partial-thickness interstitial/concealed footprint tear.  2. Infraspinatus and subscapularis tendinosis.  3. SLAP tear that involves the biceps anchor.  4. Biceps tendinosis with partial-thickness interstitial tearing.  5. Mild glenohumeral osteoarthritis.  6. Subacromial/subdeltoid bursitis.  7. AC joint arthrosis.    ASSESSMENT:      ICD-10-CM ICD-9-CM   1. Superior glenoid labrum lesion of left shoulder, subsequent encounter  S43.432D V58.89     840.7   2. Biceps tendinopathy, left  M67.922 727.9   3. Chondromalacia of left shoulder  M94.212 733.92   4.  Nontraumatic incomplete tear of left rotator cuff  M75.112 726.13   5. Chronic left shoulder pain  M25.512 719.41    G89.29 338.29     PLAN:     At this point the patient continues to have pain despite extensive prior conservative treatment.  Findings most focal to the proximal biceps groove region consistent with tendinopathy and known underlying SLAP tear.  We discussed the potential benefit of arthroscopic intervention which would include open subpectoral biceps tenodesis.  The expected postop rehab and recovery course was reviewed.  He does wish to proceed.  Previous MRI was almost a year ago.  I would like to repeat that now preop for planning.  Discussed plan for rotator cuff debridement versus repair as indicated.  Virtual visit after repeat MRI to discuss findings and the final surgical treatment plan.  Risks of surgery include but are not limited to continued or recurrent pain, stiffness, biceps deformity, tissue nonhealing re-tear, chondral injury.  I expect him to do well.    Plan is Left shoulder arthroscopic extensive debridement with open subpectoral biceps tenodesis    Preop clearance per PCP    Preop repeat MRI with virtual visit discussion    DOS 4/19/24    Informed Consent:    The details of the surgical procedure were explained, including the location of probable incisions and a description of possible hardware and/or grafts to be used. Alternatives to both operative and non-operative options with associated risks and benefits were discussed. The patient understands the likely convalescence after surgery and, in particular, the expected postop rehab and recovery course. The outlined risks and potential complications of the proposed procedure include but are not limited to: infection, poor wound healing, scarring, deformity, stiffness, swelling, continued or recurrent pain, instability, hardware or prosthetic failure if implanted, symptomatic hardware requiring removal, dislocation, weakness,  neurovascular injury, numbness, chronic regional pain disorder, tissue nonhealing/irreparability/retear, subsequent contralateral limb injury or pathology, chondral injury, arthritis, fracture, blood clot formation, inability to return to previous level of activity, anesthetic or regional block complication up to death, need for additional procedure as indicated intraoperatively, and potential need for further surgery.    The patient was also informed and understands that the risks of surgery are greater for patients with a current condition or history of heart disease, obesity, clotting disorders, recurrent infections, steroid use, current or past smoking, and factors such as sedentary lifestyle and noncompliance with medications, therapy or follow-up. The degree of the increased risk is hard to estimate with any degree of precision. If applicable, smoking cessation was discussed.     All questions were answered. The patient has verbalized understanding of these issues and wishes to proceed with the surgery as discussed.    Procedures

## 2024-04-05 ENCOUNTER — HOSPITAL ENCOUNTER (OUTPATIENT)
Dept: CARDIOLOGY | Facility: CLINIC | Age: 74
Discharge: HOME | End: 2024-04-05
Payer: COMMERCIAL

## 2024-04-05 ENCOUNTER — LAB VISIT (OUTPATIENT)
Dept: LAB | Facility: HOSPITAL | Age: 74
End: 2024-04-05
Attending: INTERNAL MEDICINE
Payer: COMMERCIAL

## 2024-04-05 ENCOUNTER — TELEPHONE (OUTPATIENT)
Dept: INTERNAL MEDICINE | Facility: CLINIC | Age: 74
End: 2024-04-05
Payer: COMMERCIAL

## 2024-04-05 DIAGNOSIS — Z01.810 PREOP CARDIOVASCULAR EXAM: ICD-10-CM

## 2024-04-05 DIAGNOSIS — Z01.810 PREOP CARDIOVASCULAR EXAM: Primary | ICD-10-CM

## 2024-04-05 LAB
ANION GAP SERPL CALC-SCNC: 8 MMOL/L (ref 8–16)
BASOPHILS # BLD AUTO: 0.04 K/UL (ref 0–0.2)
BASOPHILS NFR BLD: 0.5 % (ref 0–1.9)
BUN SERPL-MCNC: 16 MG/DL (ref 8–23)
CALCIUM SERPL-MCNC: 9.2 MG/DL (ref 8.7–10.5)
CHLORIDE SERPL-SCNC: 99 MMOL/L (ref 95–110)
CO2 SERPL-SCNC: 29 MMOL/L (ref 23–29)
CREAT SERPL-MCNC: 0.7 MG/DL (ref 0.5–1.4)
DIFFERENTIAL METHOD BLD: ABNORMAL
EOSINOPHIL # BLD AUTO: 0.1 K/UL (ref 0–0.5)
EOSINOPHIL NFR BLD: 1.1 % (ref 0–8)
ERYTHROCYTE [DISTWIDTH] IN BLOOD BY AUTOMATED COUNT: 12.4 % (ref 11.5–14.5)
EST. GFR  (NO RACE VARIABLE): >60 ML/MIN/1.73 M^2
GLUCOSE SERPL-MCNC: 118 MG/DL (ref 70–110)
HCT VFR BLD AUTO: 46 % (ref 40–54)
HGB BLD-MCNC: 15.1 G/DL (ref 14–18)
IMM GRANULOCYTES # BLD AUTO: 0.02 K/UL (ref 0–0.04)
IMM GRANULOCYTES NFR BLD AUTO: 0.3 % (ref 0–0.5)
LYMPHOCYTES # BLD AUTO: 2.1 K/UL (ref 1–4.8)
LYMPHOCYTES NFR BLD: 26.8 % (ref 18–48)
MCH RBC QN AUTO: 31.5 PG (ref 27–31)
MCHC RBC AUTO-ENTMCNC: 32.8 G/DL (ref 32–36)
MCV RBC AUTO: 96 FL (ref 82–98)
MONOCYTES # BLD AUTO: 0.5 K/UL (ref 0.3–1)
MONOCYTES NFR BLD: 6.8 % (ref 4–15)
NEUTROPHILS # BLD AUTO: 5.1 K/UL (ref 1.8–7.7)
NEUTROPHILS NFR BLD: 64.5 % (ref 38–73)
NRBC BLD-RTO: 0 /100 WBC
OHS QRS DURATION: 144 MS
OHS QTC CALCULATION: 413 MS
PLATELET # BLD AUTO: 290 K/UL (ref 150–450)
PMV BLD AUTO: 9.9 FL (ref 9.2–12.9)
POTASSIUM SERPL-SCNC: 4.1 MMOL/L (ref 3.5–5.1)
RBC # BLD AUTO: 4.79 M/UL (ref 4.6–6.2)
SODIUM SERPL-SCNC: 136 MMOL/L (ref 136–145)
WBC # BLD AUTO: 7.85 K/UL (ref 3.9–12.7)

## 2024-04-05 PROCEDURE — 36415 COLL VENOUS BLD VENIPUNCTURE: CPT | Performed by: INTERNAL MEDICINE

## 2024-04-05 PROCEDURE — 93010 ELECTROCARDIOGRAM REPORT: CPT | Mod: S$GLB,,, | Performed by: INTERNAL MEDICINE

## 2024-04-05 PROCEDURE — 80048 BASIC METABOLIC PNL TOTAL CA: CPT | Performed by: INTERNAL MEDICINE

## 2024-04-05 PROCEDURE — 85025 COMPLETE CBC W/AUTO DIFF WBC: CPT | Performed by: INTERNAL MEDICINE

## 2024-04-05 PROCEDURE — 93005 ELECTROCARDIOGRAM TRACING: CPT | Mod: S$GLB,,, | Performed by: INTERNAL MEDICINE

## 2024-04-05 NOTE — TELEPHONE ENCOUNTER
Can we have the pt get these studies and see me next week, even if we have to override a virtual slot or other like 7:30 on Thursday. Let me know of any problems so I can let his Ortho doc know.

## 2024-04-05 NOTE — TELEPHONE ENCOUNTER
----- Message from GEORGIANA Up MD sent at 4/4/2024  5:40 PM CDT -----  Dr. Puentes -     I hope all is well with you.  I saw Raffy back in clinic today for his left shoulder.  We are planning on an arthroscopic debridement with biceps tenodesis.  This is an outpatient surgery with general anesthesia and limited blood loss.  Date of surgery is 4/19/24.  He would need preop medical clearance.  Any help you can provide in that regard would be much appreciated.  Thanks so much.    Don Up

## 2024-04-06 ENCOUNTER — PATIENT MESSAGE (OUTPATIENT)
Dept: INTERNAL MEDICINE | Facility: CLINIC | Age: 74
End: 2024-04-06
Payer: COMMERCIAL

## 2024-04-06 ENCOUNTER — PATIENT MESSAGE (OUTPATIENT)
Dept: OTOLARYNGOLOGY | Facility: CLINIC | Age: 74
End: 2024-04-06
Payer: COMMERCIAL

## 2024-04-06 ENCOUNTER — PATIENT MESSAGE (OUTPATIENT)
Dept: RADIATION ONCOLOGY | Facility: CLINIC | Age: 74
End: 2024-04-06
Payer: COMMERCIAL

## 2024-04-07 ENCOUNTER — HOSPITAL ENCOUNTER (OUTPATIENT)
Dept: RADIOLOGY | Facility: HOSPITAL | Age: 74
Discharge: HOME | End: 2024-04-07
Attending: STUDENT IN AN ORGANIZED HEALTH CARE EDUCATION/TRAINING PROGRAM
Payer: COMMERCIAL

## 2024-04-07 DIAGNOSIS — M25.512 CHRONIC LEFT SHOULDER PAIN: ICD-10-CM

## 2024-04-07 DIAGNOSIS — G89.29 CHRONIC LEFT SHOULDER PAIN: ICD-10-CM

## 2024-04-07 PROCEDURE — 73221 MRI JOINT UPR EXTREM W/O DYE: CPT | Mod: TC,LT

## 2024-04-07 PROCEDURE — 73221 MRI JOINT UPR EXTREM W/O DYE: CPT | Mod: 26,LT,, | Performed by: RADIOLOGY

## 2024-04-08 ENCOUNTER — CLINICAL SUPPORT (OUTPATIENT)
Dept: AUDIOLOGY | Facility: CLINIC | Age: 74
End: 2024-04-08
Payer: COMMERCIAL

## 2024-04-08 ENCOUNTER — OFFICE VISIT (OUTPATIENT)
Dept: OTOLARYNGOLOGY | Facility: CLINIC | Age: 74
End: 2024-04-08
Payer: COMMERCIAL

## 2024-04-08 DIAGNOSIS — H93.13 TINNITUS, BILATERAL: ICD-10-CM

## 2024-04-08 DIAGNOSIS — H90.3 SENSORINEURAL HEARING LOSS (SNHL), BILATERAL: Primary | ICD-10-CM

## 2024-04-08 DIAGNOSIS — H93.8X2 EAR FULLNESS, LEFT: Primary | ICD-10-CM

## 2024-04-08 DIAGNOSIS — H90.3 BILATERAL HIGH FREQUENCY SENSORINEURAL HEARING LOSS: ICD-10-CM

## 2024-04-08 DIAGNOSIS — H61.23 IMPACTED CERUMEN, BILATERAL: ICD-10-CM

## 2024-04-08 PROCEDURE — 99214 OFFICE O/P EST MOD 30 MIN: CPT | Mod: 25,S$GLB,, | Performed by: NURSE PRACTITIONER

## 2024-04-08 PROCEDURE — 92557 COMPREHENSIVE HEARING TEST: CPT | Mod: S$GLB,,,

## 2024-04-08 PROCEDURE — 1159F MED LIST DOCD IN RCRD: CPT | Mod: CPTII,S$GLB,, | Performed by: NURSE PRACTITIONER

## 2024-04-08 PROCEDURE — 1126F AMNT PAIN NOTED NONE PRSNT: CPT | Mod: CPTII,S$GLB,, | Performed by: NURSE PRACTITIONER

## 2024-04-08 PROCEDURE — 99999 PR PBB SHADOW E&M-EST. PATIENT-LVL I: CPT | Mod: PBBFAC,,,

## 2024-04-08 PROCEDURE — 1157F ADVNC CARE PLAN IN RCRD: CPT | Mod: CPTII,S$GLB,, | Performed by: NURSE PRACTITIONER

## 2024-04-08 PROCEDURE — 92567 TYMPANOMETRY: CPT | Mod: S$GLB,,,

## 2024-04-08 PROCEDURE — 3288F FALL RISK ASSESSMENT DOCD: CPT | Mod: CPTII,S$GLB,, | Performed by: NURSE PRACTITIONER

## 2024-04-08 PROCEDURE — 1101F PT FALLS ASSESS-DOCD LE1/YR: CPT | Mod: CPTII,S$GLB,, | Performed by: NURSE PRACTITIONER

## 2024-04-08 PROCEDURE — 99999 PR PBB SHADOW E&M-EST. PATIENT-LVL III: CPT | Mod: PBBFAC,,, | Performed by: NURSE PRACTITIONER

## 2024-04-08 NOTE — TELEPHONE ENCOUNTER
Called pt to discuss portal message. Explained that PCP was out of the office today. Pt is scheduled to see PCP tomorrow. Offered to send message to the covering MD, pt said he could wait until tomorrow to discuss with PCP.

## 2024-04-08 NOTE — PROGRESS NOTES
"Raffy Rutherford Jr. was seen today in the clinic for an audiologic evaluation.  Patient's main complaint was aural fullness/ "popping" in left ear.  Mr. Rutherford reported this is a recent symptom. He denied changes in hearing since last evaluation. Previous audiogram results revealed a normal sloping to moderately-severe sensorineural hearing loss (SNHL) in the right ear and a normal sloping to moderate SNHL in the left ear.     Tympanometry revealed Type A in the right ear and Type A in the left ear.     Audiogram results revealed normal hearing sloping to mild to moderately severe SNHL bilaterally.      Speech reception thresholds were noted at 15 dB in the right ear and 10 dB in the left ear.    Speech discrimination scores were 100% in the right ear and 96% in the left ear.    Recommendations:  Otologic evaluation  Annual audiogram  Hearing protection when in noise          "

## 2024-04-08 NOTE — PROGRESS NOTES
"Subjective:   Raffy Rutherford Jr. is a 73 y.o. male who was self-referred for aural fullness. He has a past medical history of Allergy, Arthritis, Back pain, Cataract, Chronic pain, Cluster headache (2013), Colon polyps (2007), Degenerative disc disease, Depression, Diverticulitis (12/2013), Dry eye syndrome, Fibromyalgia (2013), GERD (gastroesophageal reflux disease), Hepatitis (1970's), History of prostate biopsy (2002), Hyperlipidemia, Joint pain, Prostate cancer (07/2021), PVD (posterior vitreous detachment), Salzmann's nodular dystrophy of left eye, Sleep apnea, Thyroid nodule (07/16/2014), Trigeminal neuralgia of left side of face, Tubular adenoma of colon (2007), and Visual disturbance (2012). He reports about 2 weeks of intermittent left ear fullness and the sensation that his head "feels like a melon". There is no change in his hearing, but sometimes an echo sensation, but no autophany. He states that the left ear is unable to "pop" and its almost like he is underwater. Denies otalgia or otorrhea. No new or worsening tinnitus (has a long history of tinnitus AU). Denies any right ear symptoms. He has a history of allergies and takes Zyrtec BID. Last seen 1 month ago for an ear cleaning/ cleanings usually q3 month with Dr. Tate.    The patient's medications, allergies, past medical, surgical, social and family histories were reviewed and updated as appropriate.     A detailed review of systems was obtained with pertinent positives as per the above HPI, and otherwise negative    Past Medical History  He has a past medical history of Allergy, Arthritis, Back pain, Cataract, Chronic pain, Cluster headache, Colon polyps, Degenerative disc disease, Depression, Diverticulitis, Dry eye syndrome, Fibromyalgia, GERD (gastroesophageal reflux disease), Hepatitis, History of prostate biopsy, Hyperlipidemia, Joint pain, Prostate cancer, PVD (posterior vitreous detachment), Salzmann's nodular dystrophy of left eye, " Sleep apnea, Thyroid nodule, Trigeminal neuralgia of left side of face, Tubular adenoma of colon, and Visual disturbance.    Past Surgical History  He has a past surgical history that includes Knee surgery; Hemorrhoid surgery; Cholecystectomy; Sinus surgery; Eye surgery; Back surgery; Joint replacement; Total hip arthroplasty (4/2012); Cosmetic surgery (2/10/2015); Cosmetic surgery (2/10/2015); Cataract extraction w/  intraocular lens implant (Bilateral); Spinal fusion (06/22/2015); Injection of steroid (Right, 12/6/2018); Injection of steroid (Right, 9/19/2019); Esophagogastroduodenoscopy (N/A, 12/17/2019); Colonoscopy (N/A, 12/17/2019); Cystoscopy with ureteroscopy, retrograde pyelography, and insertion of stent (Left, 8/19/2020); Fusion of lumbar spine by anterior approach (N/A, 8/20/2020); Ulnar nerve transposition (Left, 12/16/2020); Carpal tunnel release (Right, 5/18/2021); Colonoscopy (2007); Repair of extensor tendon (Left, 4/7/2022); and Irrigation and debridement of upper extremity (Left, 4/7/2022).    Family History  His family history includes Arthritis in his sister; Cancer in his maternal cousin; Colon cancer in his mother; Crohn's disease in his sister; Diabetes in his brother; Heart attack in his maternal grandfather; Irritable bowel syndrome in his son; Jaundice in his grandchild; Macular degeneration in his brother; Migraines in his sister; Neurological Disorders in his son; Other in his brother, maternal uncle, and maternal uncle; Other (age of onset: 44) in his father; Pancreatitis in his brother; Stroke (age of onset: 86) in his mother; Tremor in his daughter and son; Uterine cancer (age of onset: 77) in his mother.    Social History  He reports that he has never smoked. He has never used smokeless tobacco. He reports current alcohol use. He reports that he does not use drugs.    Allergies  He is allergic to alphagan [brimonidine], coumadin [warfarin], and oxycodone.    Medications  He has a  current medication list which includes the following prescription(s): atorvastatin, butalbital-acetaminophen-caffeine -40 mg, celecoxib, clindamycin, clonazepam, doxycycline, ergocalciferol, ajovy syringe, hydrocodone-acetaminophen, hydrocortisone-pramoxine, lamotrigine, methocarbamol, perfluorohexyloctane (pf), pregabalin, pregabalin, pregabalin, pregabalin, pregabalin, pregabalin, pregabalin, pregabalin, rabeprazole, emsam, selegiline, sildenafil, silodosin, tadalafil, trazodone, ubrogepant, UNABLE TO FIND, and tyrvaya, and the following Facility-Administered Medications: onabotulinumtoxina.    Objective:     Constitutional:   He is oriented to person, place, and time. He appears well-developed and well-nourished. He appears alert. He is cooperative.  Non-toxic appearance. He does not have a sickly appearance. He does not appear ill. Normal speech.      Head:  Normocephalic and atraumatic. Not macrocephalic and not microcephalic. Head is without abrasion, without right periorbital erythema, without left periorbital erythema and without TMJ tenderness.     Ears:    Right Ear: No drainage, swelling or tenderness. No mastoid tenderness. Tympanic membrane is not scarred, not perforated, not erythematous, not retracted and not bulging. Tympanic membrane mobility is normal. No middle ear effusion.   Left Ear: No drainage, swelling or tenderness. No mastoid tenderness. Tympanic membrane is not scarred, not perforated, not erythematous, not retracted and not bulging. Tympanic membrane mobility is normal.  No middle ear effusion.   Ceruminous debris obstructing bilateral TMs removed under microscopy    Pulmonary/Chest:   Effort normal.     Psychiatric:   He has a normal mood and affect. His speech is normal and behavior is normal.     Neurological:   He is alert and oriented to person, place, and time.     Procedure  Cerumen removal performed.  See procedure note.  Procedure Note:  The patient was brought to the  "minor procedure room and placed under the operating microscope of the left ear canal which was cleaned of ceruminous debris. Using a combination of suction, curettes and cup forceps the patient's cerumen was removed. The patient tolerated the procedure well. There were no complications.  Procedure Note:  The patient was brought to the minor procedure room and placed under the operating microscope of the right ear canal which was cleaned of ceruminous debris. Using a combination of suction, curettes and cup forceps the patient's cerumen was removed. The patient tolerated the procedure well. There were no complications.    Imaging  No pertinent imaging available.    Audiogram          I independently reviewed the tracings of the complete audiometric evaluation. I reviewed the audiogram with the patient as well. Pertinent findings include binaural sloping HF sensorineural hearing loss with normal tymps.  Assessment:     1. Ear fullness, left    2. Bilateral high frequency sensorineural hearing loss    3. Tinnitus, bilateral    4. Impacted cerumen, bilateral      Plan:     Ear fullness, left  Otoscopic exam benign. After cerumen removal AU, no retractions, infection or fluid noted. Reassurance provided. Can try switching oral antihistamine (recommended one pill daily) and/or adding Flonase. Suspect symptoms are transient. Dr. Tate noted in his last note that "Pt. may follow up with Dr. AGUSTIN Ross for ear care (or NP Ivania Li for ear cleaning only) or Dr. MONTY Rainey for otology specialty care". Patient again advised to follow-up with MD per Dr. Tate's request.       Bilateral high frequency sensorineural hearing loss  Essentially unchanged since previous audio. Audiometric testing interpretation consistent with sensorineural hearing loss. Discussed the etiology of SNHL. Hearing conservation in noisy environments.     Tinnitus, bilateral  Stable.    Impacted cerumen, bilateral  Cerumen impaction removed under " microscopy. Patient tolerated procedure well.

## 2024-04-08 NOTE — PROGRESS NOTES
Telemedicine/Virtual Visit Documentation:     The patient location is: home    The chief complaint leading to consultation is: see HPI    VISIT TYPE X   Virtual visit with synchronous audio and video x   Telephone E/M service      Total time spent with patient: see X flaca on chart below.   More than half of the time was spent counseling or coordinating care including prognosis, differential diagnosis, risks and benefits of treatment, instructions, compliance risk reductions     EST MINUTES X   69506 5    64019 10    79246 15 x   99214 25    14300 40    NEW     54081 10    82194 20    95750 30    99245 45    02061 60    PHONE      5-10    64401 11-20    80179 21-30      H&P  Orthopaedics      SUBJECTIVE:     History of Present Illness:    Raffy Rutherford JrLuisa who presents for MRI review of left shoulder.  Diagnosis of chronic recurrent left shoulder pain.  Prior MRI from May of 2023 showed no significant rotator cuff pathology.  The patient has been treated for biceps tendinopathy.  He injured the shoulder while on a trip recently and has increased pain.  Repeat MRI was ordered.    Prior Hx 4/4/2024:  Raffy Rutherford JrLuisa, presents today for follow up evaluation of his left shoulder. At last appointment, 2/19/2024, patient underwent  long head of biceps tendon sheath CSI .  This was therapeutic but not quite a therapeutic as the prior injection which resolved all of his pain.  He was doing well until on a trip he pulled himself up on a bus and felt recurrent pain over the biceps region as before.  No pop.  Prior MRI showed some low-grade rotator cuff pathology and a SLAP tear.  Prior conservative treatment has been extensive to include multiple injections, therapy and oral medication.  This has been a longstanding issue for him he would like to pursue surgery at this time for resolution.  He denies any neck or radicular symptoms.  No posterior shoulder pain.     Review of patient's allergies indicates:   Allergen  Reactions    Alphagan [brimonidine]      Patient taking MASO-B Selective Inhibitor Selegiline (Emsam)    Coumadin [warfarin]      itch    Oxycodone      hiccups     Past Medical History:   Diagnosis Date    Allergy     Arthritis     Back pain     after trauma beginning in 195    Cataract     Chronic pain     neck and left shoulder    Cluster headache 2013    Colon polyps 2007 2007-2019: TA x5, HP x3    Degenerative disc disease     Depression     Diverticulitis 12/2013    Dry eye syndrome     Fibromyalgia 2013    GERD (gastroesophageal reflux disease)     Hepatitis 1970's    A    History of prostate biopsy 2002    Hyperlipidemia     Joint pain     Prostate cancer 07/2021    PVD (posterior vitreous detachment)     Salzmann's nodular dystrophy of left eye     Sleep apnea     Thyroid nodule 07/16/2014    Trigeminal neuralgia of left side of face     Tubular adenoma of colon 2007    5 removed [id number]    Visual disturbance 2012    problems after cataract surgery     Past Surgical History:   Procedure Laterality Date    BACK SURGERY      CARPAL TUNNEL RELEASE Right 5/18/2021    Procedure: RELEASE, CARPAL TUNNEL;  Surgeon: Kaye Solis MD;  Location: [facility];  Service: Orthopedics;  Laterality: Right;    CATARACT EXTRACTION W/  INTRAOCULAR LENS IMPLANT Bilateral     CHOLECYSTECTOMY      COLONOSCOPY N/A 12/17/2019    Normal - Repeat 5yrs    COLONOSCOPY  2007 2007 TA x2, 2011 TA x3, 2014 HP x3, 2019 normal    COSMETIC SURGERY  2/10/2015    Direct mid-forehead brow lift    COSMETIC SURGERY  2/10/2015    Bilateral upper lid blepahroplasty    CYSTOSCOPY WITH URETEROSCOPY, RETROGRADE PYELOGRAPHY, AND INSERTION OF STENT Left 8/19/2020    Procedure: CYSTOSCOPY, WITH RETROGRADE PYELOGRAM AND URETERAL STENT INSERTION;  Surgeon: Katelynn George MD;  Location: [facility] OR;  Service: Urology;  Laterality: Left;    ESOPHAGOGASTRODUODENOSCOPY N/A 12/17/2019    Procedure: ESOPHAGOGASTRODUODENOSCOPY (EGD);  Surgeon: Amadou Hardin  MD;  [facility]  Service: Endoscopy;  Laterality: N/A;    EYE SURGERY      FUSION OF LUMBAR SPINE BY ANTERIOR APPROACH N/A 8/20/2020    Procedure: FUSION, SPINE, LUMBAR, ANTERIOR APPROACH L5-S1 ALIF Stand Alone;  Surgeon: Jason Caldwell MD;  Location: [facility];  Service: Neurosurgery;  Laterality: N/A;    HEMORRHOID SURGERY      with complication of chronic bleeding for 6 weeks     INJECTION OF STEROID Right 12/6/2018    Procedure: INJECTION, STEROID Right SI Joint Block and Steroid Injection;  Surgeon: Jason Caldwell MD;  Location: [facility] OR;  Service: Neurosurgery;  Laterality: Right;  Procedure: Right SI Joint Block and Steroid Injection  Surgery Time: 30 MIN  LOS: 0  Anesthesia: MAC  Radiology: C-arm  Bed: [place] 4 Poster  Position: Prone    INJECTION OF STEROID Right 9/19/2019    Procedure: INJECTION, STEROID Procedure: Right SI joint block nd steroid injection;  Surgeon: Jason Caldwell MD;  Location: [facility];  Service: Neurosurgery;  Laterality: Right;  Procedure: Right SI joint block nd steroid injection  Surgery Time: 30 mins  LOS:   Anesthesia: General MAC  Radiology:C-arm  Bed: Regular Bed  Position: Prone    IRRIGATION AND DEBRIDEMENT OF UPPER EXTREMITY Left 4/7/2022    Procedure: IRRIGATION AND DEBRIDEMENT, UPPER EXTREMITY;  Surgeon: Kaye Solis MD;  Location: [facility];  Service: Orthopedics;  Laterality: Left;    JOINT REPLACEMENT      KNEE SURGERY      involving arthroscopic surgery to both knees    REPAIR OF EXTENSOR TENDON Left 4/7/2022    Procedure: REPAIR, TENDON, EXTENSOR thumb, EPB and EPL;  Surgeon: Kaye Solis MD;  Location: [facility];  Service: Orthopedics;  Laterality: Left;    SINUS SURGERY      left molar and sinus surgery for trigeminal neuralgia    SPINAL FUSION  06/22/2015    L3-L5 XLIF/TANA    TOTAL HIP ARTHROPLASTY  4/2012    Pt states he had total hip replacement on his left hip.    ULNAR NERVE TRANSPOSITION Left 12/16/2020    Procedure: TRANSPOSITION,  NERVE, ULNAR - left carpal and cubital tunnel releases;  Surgeon: Addi Bauer MD;  Location: [facility] OR;  Service: Orthopedics;  Laterality: Left;     Family History   Problem Relation Age of Onset    Stroke Mother 86    Colon cancer Mother         early/mid-60s    Uterine cancer Mother 77    Pancreatitis Brother         acute, now s/p nathalia    Diabetes Brother     Macular degeneration Brother     Other Brother         foot drop    Other Father 44        pituitary tumor- CA vs benign?    Heart attack Maternal Grandfather         suspected, 50s    Migraines Sister     Crohn's disease Sister         w/ assoc joint issues    Arthritis Sister     Tremor Daughter     Irritable bowel syndrome Son         leaning toward Crohn's dx    Neurological Disorders Son         nonspecific, benign fasciculations of calves    Tremor Son     Jaundice Grandchild     Other Maternal Uncle         memory issue    Other Maternal Uncle         memory issue    Cancer Maternal Cousin         origin? maybe thyroid? 40s?    Melanoma Neg Hx     Amblyopia Neg Hx     Blindness Neg Hx     Cataracts Neg Hx     Glaucoma Neg Hx     Hypertension Neg Hx     Retinal detachment Neg Hx     Strabismus Neg Hx     Thyroid disease Neg Hx     Allergic rhinitis Neg Hx     Allergies Neg Hx     Angioedema Neg Hx     Asthma Neg Hx     Eczema Neg Hx     Urticaria Neg Hx     Rhinitis Neg Hx     Immunodeficiency Neg Hx     Atopy Neg Hx      Social History     Tobacco Use    Smoking status: Never    Smokeless tobacco: Never   Substance Use Topics    Alcohol use: Yes     Comment: occasion    Drug use: No      Review of Systems:  Patient denies constitutional symptoms, cardiac symptoms, respiratory symptoms, GI symptoms.  The remainder of the musculoskeletal ROS is included in the HPI.    OBJECTIVE:     Physical Exam:  Gen:  No acute distress  CV:  Peripherally well-perfused.  Pulses 2+ bilaterally.  Lungs:  Normal respiratory effort.  Abdomen:  Soft, non-tender,  non-distended  Head/Neck:  Normocephalic.  Atraumatic. No TTP, AROM and PROM intact without pain  Neuro:  CN intact without deficit, SILT throughout B/L Upper & Lower Extremities    MSK:  MRI left shoulder reviewed by me and discussed with patient. Study shows:   Full-thickness full width tear supraspinatus at the level the footprint measuring 1.5 x 1.2 cm.  No evidence for muscular atrophy.  Fraying of the proximal edge of the torn supraspinatus tendon.     Bursal surface irregularity and irregularity of the leading edge infraspinatus without evidence for full-thickness tear at that level.  Infraspinatus tendinosis.    Goutallier 1 on IS on my read    ASSESSMENT/PLAN:     A/P: Raffy ORTIZ Sangeeta Corey is a 73 y.o. with left shoulder full-thickness rotator cuff tear, biceps tendinopathy    Plan:  Repeat MRI shows a full-thickness rotator cuff tear involving the supraspinatus tendon.  Well-maintained muscle bulk and quality.  The patient has had extensive prior conservative treatment as discussed before.  He has been indicated for arthroscopic intervention.  We will amend the surgical plan to include a left shoulder arthroscopic rotator cuff repair with biceps tenodesis, possible subacromial decompression.  The details of surgery again were discussed to include the expected postop rehab and recovery course.  He understands that this is a minimum six-month recovery for rotator cuff repair.  Outlined risks of surgery include but are not limited to continued or recurrent pain, tissue nonhealing re-tear, biceps deformity, stiffness, weakness among others.  I expect him to do well.  He has a preop appointment with his primary care physician today.  -PO PT at [facility] per patients request    Informed Consent:    The details of the surgical procedure were explained, including the location of probable incisions and a description of possible hardware and/or grafts to be used. Alternatives to both operative and non-operative options  with associated risks and benefits were discussed. The patient understands the likely convalescence after surgery and, in particular, the expected postop rehab and recovery course. The outlined risks and potential complications of the proposed procedure include but are not limited to: infection, poor wound healing, scarring, deformity, stiffness, swelling, continued or recurrent pain, instability, hardware or prosthetic failure if implanted, symptomatic hardware requiring removal, dislocation, weakness, neurovascular injury, numbness, chronic regional pain disorder, tissue nonhealing/irreparability/retear, subsequent contralateral limb injury or pathology, chondral injury, arthritis, fracture, blood clot formation, inability to return to previous level of activity, anesthetic or regional block complication up to death, need for additional procedure as indicated intraoperatively, and potential need for further surgery.    The patient was also informed and understands that the risks of surgery are greater for patients with a current condition or history of heart disease, obesity, clotting disorders, recurrent infections, steroid use, current or past smoking, and factors such as sedentary lifestyle and noncompliance with medications, therapy or follow-up. The degree of the increased risk is hard to estimate with any degree of precision. If applicable, smoking cessation was discussed.     All questions were answered. The patient has verbalized understanding of these issues and wishes to proceed with the surgery as discussed.

## 2024-04-09 ENCOUNTER — OFFICE VISIT (OUTPATIENT)
Dept: SPORTS MEDICINE | Facility: CLINIC | Age: 74
End: 2024-04-09
Payer: COMMERCIAL

## 2024-04-09 ENCOUNTER — OFFICE VISIT (OUTPATIENT)
Dept: INTERNAL MEDICINE | Facility: CLINIC | Age: 74
End: 2024-04-09
Payer: COMMERCIAL

## 2024-04-09 VITALS
HEART RATE: 67 BPM | WEIGHT: 181.88 LBS | HEIGHT: 68 IN | BODY MASS INDEX: 27.57 KG/M2 | SYSTOLIC BLOOD PRESSURE: 98 MMHG | OXYGEN SATURATION: 98 % | DIASTOLIC BLOOD PRESSURE: 70 MMHG

## 2024-04-09 DIAGNOSIS — F33.9 EPISODE OF RECURRENT MAJOR DEPRESSIVE DISORDER, UNSPECIFIED DEPRESSION EPISODE SEVERITY: ICD-10-CM

## 2024-04-09 DIAGNOSIS — K21.9 GASTROESOPHAGEAL REFLUX DISEASE WITHOUT ESOPHAGITIS: ICD-10-CM

## 2024-04-09 DIAGNOSIS — Z01.810 PREOP CARDIOVASCULAR EXAM: Primary | ICD-10-CM

## 2024-04-09 DIAGNOSIS — M25.512 CHRONIC LEFT SHOULDER PAIN: ICD-10-CM

## 2024-04-09 DIAGNOSIS — S46.012A TRAUMATIC COMPLETE TEAR OF LEFT ROTATOR CUFF, INITIAL ENCOUNTER: Primary | ICD-10-CM

## 2024-04-09 DIAGNOSIS — M67.922 BICEPS TENDINOPATHY, LEFT: ICD-10-CM

## 2024-04-09 DIAGNOSIS — G89.29 CHRONIC LEFT SHOULDER PAIN: ICD-10-CM

## 2024-04-09 PROCEDURE — 3078F DIAST BP <80 MM HG: CPT | Mod: CPTII,S$GLB,, | Performed by: INTERNAL MEDICINE

## 2024-04-09 PROCEDURE — 3074F SYST BP LT 130 MM HG: CPT | Mod: CPTII,S$GLB,, | Performed by: INTERNAL MEDICINE

## 2024-04-09 PROCEDURE — 1159F MED LIST DOCD IN RCRD: CPT | Mod: CPTII,S$GLB,, | Performed by: INTERNAL MEDICINE

## 2024-04-09 PROCEDURE — 99213 OFFICE O/P EST LOW 20 MIN: CPT | Mod: 95,,, | Performed by: ORTHOPAEDIC SURGERY

## 2024-04-09 PROCEDURE — 1157F ADVNC CARE PLAN IN RCRD: CPT | Mod: CPTII,S$GLB,, | Performed by: INTERNAL MEDICINE

## 2024-04-09 PROCEDURE — 1125F AMNT PAIN NOTED PAIN PRSNT: CPT | Mod: CPTII,S$GLB,, | Performed by: INTERNAL MEDICINE

## 2024-04-09 PROCEDURE — 3288F FALL RISK ASSESSMENT DOCD: CPT | Mod: CPTII,S$GLB,, | Performed by: INTERNAL MEDICINE

## 2024-04-09 PROCEDURE — 3008F BODY MASS INDEX DOCD: CPT | Mod: CPTII,S$GLB,, | Performed by: INTERNAL MEDICINE

## 2024-04-09 PROCEDURE — 1101F PT FALLS ASSESS-DOCD LE1/YR: CPT | Mod: CPTII,S$GLB,, | Performed by: INTERNAL MEDICINE

## 2024-04-09 PROCEDURE — 1157F ADVNC CARE PLAN IN RCRD: CPT | Mod: CPTII,95,, | Performed by: ORTHOPAEDIC SURGERY

## 2024-04-09 PROCEDURE — 99999 PR PBB SHADOW E&M-EST. PATIENT-LVL IV: CPT | Mod: PBBFAC,,, | Performed by: INTERNAL MEDICINE

## 2024-04-09 PROCEDURE — 99214 OFFICE O/P EST MOD 30 MIN: CPT | Mod: S$GLB,,, | Performed by: INTERNAL MEDICINE

## 2024-04-09 RX ORDER — TRAZODONE HYDROCHLORIDE 100 MG/1
100 TABLET ORAL NIGHTLY
Qty: 90 TABLET | Refills: 6 | Status: SHIPPED | OUTPATIENT
Start: 2024-04-09

## 2024-04-09 RX ORDER — TRAZODONE HYDROCHLORIDE 50 MG/1
50 TABLET ORAL NIGHTLY
Qty: 90 TABLET | Refills: 1 | Status: SHIPPED | OUTPATIENT
Start: 2024-04-09 | End: 2024-04-09

## 2024-04-09 NOTE — TELEPHONE ENCOUNTER
No care due was identified.  [facility] Embedded Care Due Messages. Reference number: [id number].   4/09/2024 7:07:10 AM CDT

## 2024-04-09 NOTE — TELEPHONE ENCOUNTER
Refill Decision Note   Raffy Rutherford  is requesting a refill authorization.  Brief Assessment and Rationale for Refill:  Approve     Medication Therapy Plan:         Comments:     Note composed:7:42 AM 04/09/2024

## 2024-04-09 NOTE — PROGRESS NOTES
Subjective:       Patient ID: Raffy Rutherford Jr. is a 73 y.o. male.    Chief Complaint: Pre-op Exam    Patient in for preop prior to left shoulder surgery.  Patient had some underlying injury or irritation then on a cruise over stressed it and thinks he did further damage.  He has had scans x-rays, cortisone shots.  He is having a surgery to clean up and repair the shoulder.  He has had numerous surgeries in the past and no problems with anesthesia.  We reviewed his medications and he understands which wants I suggest older including Celebrex.  He will also avoid aspirin, anti-inflammatories for at least 1 week prior.    No recent steroids.  Occasional cortisone shots for the shoulder but none recently.  No history of abnormal bleeding, bruising or clotting.  No chest pain or shortness a breath.  Chemistry and blood count were stable.  EKG with no acute changes.  EKG was several years ago and he does have a bundle branch block now no other clinical signs or symptoms to suspect heart disease.  He is able to walk without any symptoms.      Review of Systems   Constitutional:  Negative for fever.   Respiratory:  Negative for chest tightness and shortness of breath.    Cardiovascular:  Negative for chest pain and leg swelling.   Musculoskeletal:  Positive for arthralgias.   Psychiatric/Behavioral:  Positive for sleep disturbance. The patient is nervous/anxious.            Past Medical History:   Diagnosis Date    Allergy     Arthritis     Back pain     after trauma beginning in 89    Cataract     Chronic pain     neck and left shoulder    Cluster headache 2013    Colon polyps 2007 2007-2019: TA x5, HP x3    Degenerative disc disease     Depression     Diverticulitis 12/2013    Dry eye syndrome     Fibromyalgia 2013    GERD (gastroesophageal reflux disease)     Hepatitis 1970's    A    History of prostate biopsy 2002    Hyperlipidemia     Joint pain     Prostate cancer 07/2021    PVD (posterior vitreous detachment)      Salzmann's nodular dystrophy of left eye     Sleep apnea     Thyroid nodule 07/16/2014    Trigeminal neuralgia of left side of face     Tubular adenoma of colon 2007    5 removed [id number]    Visual disturbance 2012    problems after cataract surgery     Past Surgical History:   Procedure Laterality Date    BACK SURGERY      CARPAL TUNNEL RELEASE Right 5/18/2021    Procedure: RELEASE, CARPAL TUNNEL;  Surgeon: Kaye Solis MD;  Location: [facility];  Service: Orthopedics;  Laterality: Right;    CATARACT EXTRACTION W/  INTRAOCULAR LENS IMPLANT Bilateral     CHOLECYSTECTOMY      COLONOSCOPY N/A 12/17/2019    Normal - Repeat 5yrs    COLONOSCOPY  2007 2007 TA x2, 2011 TA x3, 2014 HP x3, 2019 normal    COSMETIC SURGERY  2/10/2015    Direct mid-forehead brow lift    COSMETIC SURGERY  2/10/2015    Bilateral upper lid blepahroplasty    CYSTOSCOPY WITH URETEROSCOPY, RETROGRADE PYELOGRAPHY, AND INSERTION OF STENT Left 8/19/2020    Procedure: CYSTOSCOPY, WITH RETROGRADE PYELOGRAM AND URETERAL STENT INSERTION;  Surgeon: Katelynn George MD;  Location: [facility];  Service: Urology;  Laterality: Left;    ESOPHAGOGASTRODUODENOSCOPY N/A 12/17/2019    Procedure: ESOPHAGOGASTRODUODENOSCOPY (EGD);  Surgeon: Amadou Hardin MD;  Location: [facility] ([address] [place] [address]);  Service: Endoscopy;  Laterality: N/A;    EYE SURGERY      FUSION OF LUMBAR SPINE BY ANTERIOR APPROACH N/A 8/20/2020    Procedure: FUSION, SPINE, LUMBAR, ANTERIOR APPROACH L5-S1 ALIF Stand Alone;  Surgeon: Jason Caldwell MD;  Location: [facility];  Service: Neurosurgery;  Laterality: N/A;    HEMORRHOID SURGERY      with complication of chronic bleeding for 6 weeks     INJECTION OF STEROID Right 12/6/2018    Procedure: INJECTION, STEROID Right SI Joint Block and Steroid Injection;  Surgeon: Jason Caldwell MD;  Location: [facility];  Service: Neurosurgery;  Laterality: Right;  Procedure: Right SI Joint Block and Steroid Injection  Surgery Time: 30 MIN  LOS: 0  Anesthesia:  MAC  Radiology: C-arm  Bed: [place] 4 Poster  Position: Prone    INJECTION OF STEROID Right 9/19/2019    Procedure: INJECTION, STEROID Procedure: Right SI joint block nd steroid injection;  Surgeon: Jason Caldwell MD;  Location: [facility];  Service: Neurosurgery;  Laterality: Right;  Procedure: Right SI joint block nd steroid injection  Surgery Time: 30 mins  LOS:   Anesthesia: General MAC  Radiology:C-arm  Bed: Regular Bed  Position: Prone    IRRIGATION AND DEBRIDEMENT OF UPPER EXTREMITY Left 4/7/2022    Procedure: IRRIGATION AND DEBRIDEMENT, UPPER EXTREMITY;  Surgeon: Kaye Solis MD;  Location: [facility];  Service: Orthopedics;  Laterality: Left;    JOINT REPLACEMENT      KNEE SURGERY      involving arthroscopic surgery to both knees    REPAIR OF EXTENSOR TENDON Left 4/7/2022    Procedure: REPAIR, TENDON, EXTENSOR thumb, EPB and EPL;  Surgeon: Kaye Solis MD;  Location: [facility];  Service: Orthopedics;  Laterality: Left;    SINUS SURGERY      left molar and sinus surgery for trigeminal neuralgia    SPINAL FUSION  06/22/2015    L3-L5 XLIF/TANA    TOTAL HIP ARTHROPLASTY  4/2012    Pt states he had total hip replacement on his left hip.    ULNAR NERVE TRANSPOSITION Left 12/16/2020    Procedure: TRANSPOSITION, NERVE, ULNAR - left carpal and cubital tunnel releases;  Surgeon: Addi Bauer MD;  Location: [facility];  Service: Orthopedics;  Laterality: Left;      Patient Active Problem List   Diagnosis    Depression    Hyperlipidemia    Chronic pain    Adenomatous polyp    GERD (gastroesophageal reflux disease)    Back pain    Sleep apnea    Visual disturbances    Spondylosis without myelopathy    Degeneration of lumbar or lumbosacral intervertebral disc    Spinal stenosis, lumbar region, without neurogenic claudication    Thoracic or lumbosacral neuritis or radiculitis, unspecified    Displacement of lumbar intervertebral disc without myelopathy    Acquired spondylolisthesis    Lumbago    Status post cataract  extraction and insertion of intraocular lens - Both Eyes    Fibromyalgia    OP (osteoporosis)    Compression fracture of T12 vertebra    Diverticulitis large intestine    Osteoarthritis    Lower back pain    Lower extremity pain    Muscle weakness    Range of motion deficit    Facet joint disease of cervical region    Occipital neuralgia    Chronic migraine without aura, with intractable migraine, so stated, with status migrainosus    Cervical radiculopathy    Paroxysmal hemicrania    Sciatic nerve pain    Chronic LBP    DJD (degenerative joint disease) of lumbar spine    Chronic neck pain    Right lumbar radiculopathy    Thyroid nodule    Carpal tunnel syndrome of right wrist    Paresthesia    Degenerative disc disease    S/P lumbar spinal fusion    Right wrist tendinitis    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    Headache around the eyes    Closed fracture of left distal radius    Bilateral foot-drop    Idiopathic peripheral neuropathy    Sacroiliac joint dysfunction of right side    SI (sacroiliac) joint dysfunction    Episodic migraine without status migrainosus, not intractable    Lumbar disc herniation with radiculopathy    Anxiety about health    MDD (major depressive disorder), recurrent episode, moderate    Anxiety    History of colon polyps    Iron deficiency anemia    DDD (degenerative disc disease), lumbosacral    Spondylolisthesis at L5-S1 level    CTS (carpal tunnel syndrome)    Pain in left hand    Right carpal tunnel syndrome    Decreased range of motion of neck    Poor posture    Decreased strength of upper extremity    Prostate cancer    Impaired gait    Balance problems    Genetic disorder    Bilateral carotid artery stenosis    Other specified diseases of spinal cord    Chronic pain of left hand    Pain in thumb joint with movement of left hand    Decreased activities of daily living (ADL)    Aortic atherosclerosis    Chronic migraine without aura without status migrainosus, not  intractable    Chronic left shoulder pain    Incomplete tear of left rotator cuff    Primary osteoarthritis, left shoulder    Pain    Decreased range of motion of finger of right hand    Posterior vitreous detachment of both eyes    Pseudophakia of both eyes    Dry eye syndrome of both eyes    Meibomian gland dysfunction (MGD) of both eyes        Objective:      Physical Exam  Constitutional:       General: He is not in acute distress.     Appearance: He is well-developed.   HENT:      Head: Normocephalic and atraumatic.      Right Ear: Tympanic membrane, ear canal and external ear normal.      Left Ear: Tympanic membrane, ear canal and external ear normal.      Mouth/Throat:      Pharynx: No oropharyngeal exudate or posterior oropharyngeal erythema.   Eyes:      General: No scleral icterus.     Conjunctiva/sclera: Conjunctivae normal.      Pupils: Pupils are equal, round, and reactive to light.   Neck:      Thyroid: No thyromegaly.      Comments: No supraclavicular nodes palpated  Cardiovascular:      Rate and Rhythm: Normal rate and regular rhythm.      Pulses: Normal pulses.      Heart sounds: Normal heart sounds. No murmur heard.  Pulmonary:      Effort: Pulmonary effort is normal.      Breath sounds: Normal breath sounds. No wheezing.   Abdominal:      General: Bowel sounds are normal.      Palpations: Abdomen is soft. There is no mass.      Tenderness: There is no abdominal tenderness.   Musculoskeletal:         General: Tenderness (left shoulder) present.      Cervical back: Normal range of motion and neck supple.      Right lower leg: No edema.      Left lower leg: No edema.   Lymphadenopathy:      Cervical: No cervical adenopathy.   Skin:     Coloration: Skin is not jaundiced or pale.   Neurological:      General: No focal deficit present.      Mental Status: He is alert and oriented to person, place, and time.      Coordination: Coordination abnormal (lower extremity braces).   Psychiatric:         Mood  "and Affect: Mood normal.         Behavior: Behavior normal.         Assessment:       Problem List Items Addressed This Visit          Psychiatric    Depression       GI    GERD (gastroesophageal reflux disease)       Orthopedic    Chronic left shoulder pain     Other Visit Diagnoses       Preop cardiovascular exam    -  Primary            Plan:         Raffy was seen today for pre-op exam.    Diagnoses and all orders for this visit:    Preop cardiovascular exam    Chronic left shoulder pain    Episode of recurrent major depressive disorder, unspecified depression episode severity    Gastroesophageal reflux disease without esophagitis    Other orders  -     traZODone (DESYREL) 100 MG tablet; Take 1 tablet (100 mg total) by mouth every evening.             Clinically stable-cleared for shoulder surgery.  Note to Orthopedics.  Old anti-inflammatories, any blood thinners such as aspirin or fish oil or vitamin-E.  He has is upcoming anesthesia preop soon.        Portions of this note may have been created with voice recognition software. Occasional "wrong-word" or "sound-a-like" substitutions may have occurred due to the inherent limitations of voice recognition software. Please, read the note carefully and recognize, using context, where substitutions have occurred.  "

## 2024-04-09 NOTE — Clinical Note
Pt seen today and is cleared for the upcoming shoulder surgery. Let me know if I can assist or help in any other way perioperatively.

## 2024-04-10 ENCOUNTER — PATIENT MESSAGE (OUTPATIENT)
Dept: SPORTS MEDICINE | Facility: CLINIC | Age: 74
End: 2024-04-10
Payer: COMMERCIAL

## 2024-04-10 ENCOUNTER — ANESTHESIA EVENT (OUTPATIENT)
Dept: SURGERY | Facility: HOSPITAL | Age: 74
End: 2024-04-10
Payer: COMMERCIAL

## 2024-04-10 NOTE — ANESTHESIA PREPROCEDURE EVALUATION
Patient requesting that no fellow or residents from anesthesia or surgery be involved in his care.                                                                                                               04/10/2024  Raffy Rutherford Jr. is a 73 y.o., male.    Pre-operative evaluation for Procedure(s) (LRB):  DEBRIDEMENT, SHOULDER, ARTHROSCOPIC (Left)  FIXATION, TENDON, BICEPS TENODESIS (Left)  REPAIR, ROTATOR CUFF, ARTHROSCOPIC (Left)    Raffy Rutherford Jr. is a 74 y.o. male     Patient Active Problem List   Diagnosis    Depression    Hyperlipidemia    Chronic pain    Adenomatous polyp    GERD (gastroesophageal reflux disease)    Back pain    Sleep apnea    Visual disturbances    Spondylosis without myelopathy    Degeneration of lumbar or lumbosacral intervertebral disc    Spinal stenosis, lumbar region, without neurogenic claudication    Thoracic or lumbosacral neuritis or radiculitis, unspecified    Displacement of lumbar intervertebral disc without myelopathy    Acquired spondylolisthesis    Lumbago    Status post cataract extraction and insertion of intraocular lens - Both Eyes    Fibromyalgia    OP (osteoporosis)    Compression fracture of T12 vertebra    Diverticulitis large intestine    Osteoarthritis    Lower back pain    Lower extremity pain    Muscle weakness    Range of motion deficit    Facet joint disease of cervical region    Occipital neuralgia    Chronic migraine without aura, with intractable migraine, so stated, with status migrainosus    Cervical radiculopathy    Paroxysmal hemicrania    Sciatic nerve pain    Chronic LBP    DJD (degenerative joint disease) of lumbar spine    Chronic neck pain    Right lumbar radiculopathy    Thyroid nodule    Carpal tunnel syndrome of right wrist    Paresthesia    Degenerative disc disease    S/P lumbar spinal fusion    Right wrist tendinitis    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    Headache around the eyes    Closed fracture of left distal  radius    Bilateral foot-drop    Idiopathic peripheral neuropathy    Sacroiliac joint dysfunction of right side    SI (sacroiliac) joint dysfunction    Episodic migraine without status migrainosus, not intractable    Lumbar disc herniation with radiculopathy    Anxiety about health    MDD (major depressive disorder), recurrent episode, moderate    Anxiety    History of colon polyps    Iron deficiency anemia    DDD (degenerative disc disease), lumbosacral    Spondylolisthesis at L5-S1 level    CTS (carpal tunnel syndrome)    Pain in left hand    Right carpal tunnel syndrome    Decreased range of motion of neck    Poor posture    Decreased strength of upper extremity    Prostate cancer    Impaired gait    Balance problems    Genetic disorder    Bilateral carotid artery stenosis    Other specified diseases of spinal cord    Chronic pain of left hand    Pain in thumb joint with movement of left hand    Decreased activities of daily living (ADL)    Aortic atherosclerosis    Chronic migraine without aura without status migrainosus, not intractable    Chronic left shoulder pain    Incomplete tear of left rotator cuff    Primary osteoarthritis, left shoulder    Pain    Decreased range of motion of finger of right hand    Posterior vitreous detachment of both eyes    Pseudophakia of both eyes    Dry eye syndrome of both eyes    Meibomian gland dysfunction (MGD) of both eyes       Review of patient's allergies indicates:   Allergen Reactions    Alphagan [brimonidine]      Patient taking MASO-B Selective Inhibitor Selegiline (Emsam)    Coumadin [warfarin]      itch    Oxycodone      hiccups       No current facility-administered medications on file prior to encounter.     Current Outpatient Medications on File Prior to Encounter   Medication Sig Dispense Refill    atorvastatin (LIPITOR) 40 MG tablet Take 1 tablet (40 mg total) by mouth once daily. 90 tablet 1    butalbital-acetaminophen-caffeine -40 mg (FIORICET, ESGIC)  -40 mg per tablet Take 1 tablet by mouth daily as needed for 30 days. 15 tablet 0    celecoxib (CELEBREX) 100 MG capsule Take 2 capsules (200 mg total) by mouth 2 (two) times daily. 180 capsule 3    clonazePAM (KLONOPIN) 0.5 MG tablet Take 1 tablet by mouth twice a day as needed for anxiety 60 tablet 5    doxycycline (VIBRAMYCIN) 100 MG Cap Take 1 capsule (100 mg total) by mouth twice daily for 2 days, THEN take 1 capsule (100 mg total) by mouth once daily for 28 days. 32 capsule 1    fremanezumab-vfrm (AJOVY SYRINGE) 225 mg/1.5 mL injection Inject 1.5 mLs (225 mg total) into the skin every 28 days. 1.5 mL 5    HYDROcodone-acetaminophen (NORCO) 5-325 mg per tablet Take 1 tablet by mouth every 8 (eight) hours as needed for Pain. 90 tablet 0    lamoTRIgine (LAMICTAL) 200 MG tablet Take 1 tablet (200 mg total) by mouth once daily. 90 tablet 3    methocarbamoL (ROBAXIN) 750 MG Tab Take 1 tablet (750 mg total) by mouth 3 (three) times daily. 60 tablet 2    RABEprazole (ACIPHEX) 20 mg tablet Take 1 tablet (20 mg total) by mouth 2 (two) times daily. 180 tablet 3    selegiline (EMSAM) 9 mg/24 hr Place 1 patch onto the skin once daily. 30 patch 11    tadalafiL (CIALIS) 10 MG tablet Take 1 tablet (10 mg total) by mouth daily as needed for Erectile Dysfunction. 30 tablet 11    ubrogepant (UBRELVY) 100 mg tablet Take 1 tablet (100 mg total) by mouth every 2 (two) hours as needed for Migraine. Max 200 mg/day. 16 tablet 5    clindamycin (CLEOCIN) 150 MG capsule Take 4 capsules (600 mg total) by mouth 1 hour prior to dental appointment. (Patient not taking: Reported on 4/9/2024) 12 capsule 1    ergocalciferol (VITAMIN D2) 50,000 unit Cap Take 1 capsule (50,000 Units total) by mouth every 7 days. 12 capsule 3    hydrocortisone-pramoxine (ANALPRAM-HC) 2.5-1 % Crea Place rectally 3 (three) times daily. 30 g 2    perfluorohexyloctane, PF, 100 % Drop Place 1 drop into both eyes 4 (four) times daily. 3 mL 11    pregabalin (LYRICA)  25 MG capsule Take 2 capsules (50 mg total) by mouth once daily. 90 capsule 1    pregabalin (LYRICA) 75 MG capsule Take 3 capsules by mouth at night (Patient taking differently: Take 2 capsules by mouth at night) 90 capsule 1       Past Surgical History:   Procedure Laterality Date    BACK SURGERY      CARPAL TUNNEL RELEASE Right 5/18/2021    Procedure: RELEASE, CARPAL TUNNEL;  Surgeon: Kaye Solis MD;  Location: [facility];  Service: Orthopedics;  Laterality: Right;    CATARACT EXTRACTION W/  INTRAOCULAR LENS IMPLANT Bilateral     CHOLECYSTECTOMY      COLONOSCOPY N/A 12/17/2019    Normal - Repeat 5yrs    COLONOSCOPY  2007 2007 TA x2, 2011 TA x3, 2014 HP x3, 2019 normal    COSMETIC SURGERY  2/10/2015    Direct mid-forehead brow lift    COSMETIC SURGERY  2/10/2015    Bilateral upper lid blepahroplasty    CYSTOSCOPY WITH URETEROSCOPY, RETROGRADE PYELOGRAPHY, AND INSERTION OF STENT Left 8/19/2020    Procedure: CYSTOSCOPY, WITH RETROGRADE PYELOGRAM AND URETERAL STENT INSERTION;  Surgeon: Katelynn George MD;  Location: [facility];  Service: Urology;  Laterality: Left;    ESOPHAGOGASTRODUODENOSCOPY N/A 12/17/2019    Procedure: ESOPHAGOGASTRODUODENOSCOPY (EGD);  Surgeon: Amadou Hardin MD;  [facility]  Service: Endoscopy;  Laterality: N/A;    EYE SURGERY      FUSION OF LUMBAR SPINE BY ANTERIOR APPROACH N/A 8/20/2020    Procedure: FUSION, SPINE, LUMBAR, ANTERIOR APPROACH L5-S1 ALIF Stand Alone;  Surgeon: Jason Caldwell MD;  Location: [facility];  Service: Neurosurgery;  Laterality: N/A;    HEMORRHOID SURGERY      with complication of chronic bleeding for 6 weeks     INJECTION OF STEROID Right 12/6/2018    Procedure: INJECTION, STEROID Right SI Joint Block and Steroid Injection;  Surgeon: Jason Caldwell MD;  Location: [facility];  Service: Neurosurgery;  Laterality: Right;  Procedure: Right SI Joint Block and Steroid Injection  Surgery Time: 30 MIN  LOS: 0  Anesthesia: MAC  Radiology: C-arm  Bed: [place] [address]  "Poster  Position: Prone    INJECTION OF STEROID Right 9/19/2019    Procedure: INJECTION, STEROID Procedure: Right SI joint block nd steroid injection;  Surgeon: Jason Caldwell MD;  Location: [facility];  Service: Neurosurgery;  Laterality: Right;  Procedure: Right SI joint block nd steroid injection  Surgery Time: 30 mins  LOS:   Anesthesia: General MAC  Radiology:C-arm  Bed: Regular Bed  Position: Prone    IRRIGATION AND DEBRIDEMENT OF UPPER EXTREMITY Left 4/7/2022    Procedure: IRRIGATION AND DEBRIDEMENT, UPPER EXTREMITY;  Surgeon: Kaye Solis MD;  Location: [facility] OR;  Service: Orthopedics;  Laterality: Left;    JOINT REPLACEMENT      KNEE SURGERY      involving arthroscopic surgery to both knees    REPAIR OF EXTENSOR TENDON Left 4/7/2022    Procedure: REPAIR, TENDON, EXTENSOR thumb, EPB and EPL;  Surgeon: Kaye Solis MD;  Location: [facility] OR;  Service: Orthopedics;  Laterality: Left;    SINUS SURGERY      left molar and sinus surgery for trigeminal neuralgia    SPINAL FUSION  06/22/2015    L3-L5 XLIF/TANA    TOTAL HIP ARTHROPLASTY  4/2012    Pt states he had total hip replacement on his left hip.    ULNAR NERVE TRANSPOSITION Left 12/16/2020    Procedure: TRANSPOSITION, NERVE, ULNAR - left carpal and cubital tunnel releases;  Surgeon: Addi Bauer MD;  Location: [facility] OR;  Service: Orthopedics;  Laterality: Left;         CBC:  Lab Results   Component Value Date    WBC 7.85 04/05/2024    RBC 4.79 04/05/2024    HGB 15.1 04/05/2024    HCT 46.0 04/05/2024     04/05/2024    MCV 96 04/05/2024    MCH 31.5 (H) 04/05/2024    MCHC 32.8 04/05/2024       CMP:   Lab Results   Component Value Date     04/05/2024    K 4.1 04/05/2024    CL 99 04/05/2024    CO2 29 04/05/2024    BUN 16 04/05/2024    CREATININE 0.7 04/05/2024     (H) 04/05/2024    CALCIUM 9.2 04/05/2024       INR:  No results found for: "PT", "INR", "PROTIME", "APTT"      Diagnostic Studies:      EKG:   Results for orders placed or " "performed during the hospital encounter of 04/05/24   EKG 12-lead    Collection Time: 04/05/24  1:34 PM   Result Value Ref Range    QRS Duration 144 ms    [facility] QTC Calculation 413 ms    Narrative    Test Reason : Z01.810,    Vent. Rate : 066 BPM     Atrial Rate : 066 BPM     P-R Int : 156 ms          QRS Dur : 144 ms      QT Int : 394 ms       P-R-T Axes : 049 -64 029 degrees     QTc Int : 413 ms    Normal sinus rhythm  Left axis deviation  Right bundle branch block  Abnormal ECG  When compared with ECG of 03-AUG-2020 11:51,  Right bundle branch block is now Present  Confirmed by MARINA CASTELLON MD ([id number]) on 4/5/2024 4:06:01 PM    Referred By: FABI ELLIOTT           Confirmed By:MARINA CASTELLON MD      Pre-op Vitals [04/19/24 0739]   BP Pulse Resp Temp SpO2   129/76 62 16 37.2 °C (99 °F) 98 %      Height Weight BMI (Calculated)     5' 8" 174 lb 26.5          Pre-op Assessment          Review of Systems         Anesthesia Plan  Type of Anesthesia, risks & benefits discussed:    Anesthesia Type: Regional, Gen ETT  Intra-op Monitoring Plan: Standard ASA Monitors  Post Op Pain Control Plan: multimodal analgesia, peripheral nerve block and IV/PO Opioids PRN  Induction:  IV  Informed Consent: Informed consent signed with the Patient and all parties understand the risks and agree with anesthesia plan.  All questions answered.   ASA Score: 2    Ready For Surgery From Anesthesia Perspective.     .      "

## 2024-04-11 ENCOUNTER — PATIENT MESSAGE (OUTPATIENT)
Dept: REHABILITATION | Facility: HOSPITAL | Age: 74
End: 2024-04-11
Payer: COMMERCIAL

## 2024-04-11 ENCOUNTER — OFFICE VISIT (OUTPATIENT)
Dept: SPORTS MEDICINE | Facility: CLINIC | Age: 74
End: 2024-04-11
Payer: COMMERCIAL

## 2024-04-11 VITALS
WEIGHT: 181.88 LBS | SYSTOLIC BLOOD PRESSURE: 109 MMHG | BODY MASS INDEX: 27.57 KG/M2 | HEIGHT: 68 IN | DIASTOLIC BLOOD PRESSURE: 67 MMHG | HEART RATE: 65 BPM

## 2024-04-11 DIAGNOSIS — M67.922 BICEPS TENDINOPATHY, LEFT: ICD-10-CM

## 2024-04-11 DIAGNOSIS — S46.012A TRAUMATIC COMPLETE TEAR OF LEFT ROTATOR CUFF: ICD-10-CM

## 2024-04-11 DIAGNOSIS — S46.012A TRAUMATIC COMPLETE TEAR OF LEFT ROTATOR CUFF, INITIAL ENCOUNTER: Primary | ICD-10-CM

## 2024-04-11 PROCEDURE — 99999 PR PBB SHADOW E&M-EST. PATIENT-LVL V: CPT | Mod: PBBFAC,,, | Performed by: PHYSICIAN ASSISTANT

## 2024-04-11 PROCEDURE — 99499 UNLISTED E&M SERVICE: CPT | Mod: S$GLB,,, | Performed by: PHYSICIAN ASSISTANT

## 2024-04-11 RX ORDER — SODIUM CHLORIDE 9 MG/ML
INJECTION, SOLUTION INTRAVENOUS CONTINUOUS
Status: CANCELLED | OUTPATIENT
Start: 2024-04-11

## 2024-04-11 RX ORDER — ONDANSETRON 4 MG/1
4 TABLET, ORALLY DISINTEGRATING ORAL EVERY 8 HOURS PRN
Qty: 30 TABLET | Refills: 0 | Status: SHIPPED | OUTPATIENT
Start: 2024-04-11 | End: 2024-05-23

## 2024-04-11 RX ORDER — CEFAZOLIN SODIUM 2 G/50ML
2 SOLUTION INTRAVENOUS
Status: CANCELLED | OUTPATIENT
Start: 2024-04-11

## 2024-04-11 RX ORDER — ASPIRIN 81 MG/1
81 TABLET ORAL 2 TIMES DAILY
Qty: 28 TABLET | Refills: 0 | Status: SHIPPED | OUTPATIENT
Start: 2024-04-11 | End: 2024-05-23

## 2024-04-11 RX ORDER — MUPIROCIN 20 MG/G
OINTMENT TOPICAL
Status: CANCELLED | OUTPATIENT
Start: 2024-04-11

## 2024-04-11 NOTE — H&P (VIEW-ONLY)
Raffy Langepartha Corey  is here for a completion of his perioperative paperwork. he  Is scheduled to undergo left shoulder arthroscopic rotator cuff repair with biceps tenodesis, possible subacromial decompression  on 4/19/2024.  He is a healthy individual and does need clearance for this procedure.     Pending clearance per PCP Dr. Puentes.    Patient is chronic pain patient who taking Norco 5mg TID. Post operatively in the past he has taking oral Dilaudid. Will reach out to his PCP and pain doctor for post op plan. I have messaged Dr. Puentes and called Dr. Olivarez. Dr. Olivarez saying Dilaudid 1mg q6h hours as needed for pain post operatively for the first week is fine and the patient can resume his normal pain regimen post op.    Risks, indications and benefits of the surgical procedure were discussed with the patient. All questions with regard to surgery, rehab, expected return to functional activities, activities of daily living and recreational endeavors were answered to his satisfaction.    Discussed COVID-19 with the patient, they are aware of our current policies and procedures, were given the option of delaying surgery, and they elect to proceed.    Patient was informed and understands the risks of surgery are greater for patients with a current condition or hx of heart disease, obesity, clotting disorders, recurrent infections, steroid use, current or past smoking, and factors such as sedentary lifestyle and noncompliance with medications, therapy or f/u. The degree of the increased risk is hard to estimate w/ any degree of precision.    Once no other questions were asked, a brief history and physical exam was then performed.    PAST MEDICAL HISTORY:   Past Medical History:   Diagnosis Date    Allergy     Arthritis     Back pain     after trauma beginning in 89    Cataract     Chronic pain     neck and left shoulder    Cluster headache 2013    Colon polyps 2007 2007-2019: TA x5, HP x3    Degenerative disc  disease     Depression     Diverticulitis 12/2013    Dry eye syndrome     Fibromyalgia 2013    GERD (gastroesophageal reflux disease)     Hepatitis 1970's    A    History of prostate biopsy 2002    Hyperlipidemia     Joint pain     Prostate cancer 07/2021    PVD (posterior vitreous detachment)     Salzmann's nodular dystrophy of left eye     Sleep apnea     Thyroid nodule 07/16/2014    Trigeminal neuralgia of left side of face     Tubular adenoma of colon 2007    5 removed [id number]    Visual disturbance 2012    problems after cataract surgery     PAST SURGICAL HISTORY:   Past Surgical History:   Procedure Laterality Date    BACK SURGERY      CARPAL TUNNEL RELEASE Right 5/18/2021    Procedure: RELEASE, CARPAL TUNNEL;  Surgeon: Kaye Solis MD;  Location: [facility];  Service: Orthopedics;  Laterality: Right;    CATARACT EXTRACTION W/  INTRAOCULAR LENS IMPLANT Bilateral     CHOLECYSTECTOMY      COLONOSCOPY N/A 12/17/2019    Normal - Repeat 5yrs    COLONOSCOPY  2007 2007 TA x2, 2011 TA x3, 2014 HP x3, 2019 normal    COSMETIC SURGERY  2/10/2015    Direct mid-forehead brow lift    COSMETIC SURGERY  2/10/2015    Bilateral upper lid blepahroplasty    CYSTOSCOPY WITH URETEROSCOPY, RETROGRADE PYELOGRAPHY, AND INSERTION OF STENT Left 8/19/2020    Procedure: CYSTOSCOPY, WITH RETROGRADE PYELOGRAM AND URETERAL STENT INSERTION;  Surgeon: Katelynn George MD;  Location: [facility] OR;  Service: Urology;  Laterality: Left;    ESOPHAGOGASTRODUODENOSCOPY N/A 12/17/2019    Procedure: ESOPHAGOGASTRODUODENOSCOPY (EGD);  Surgeon: Amadou Hardin MD;  Location: [facility] ([address] [place] [address]);  Service: Endoscopy;  Laterality: N/A;    EYE SURGERY      FUSION OF LUMBAR SPINE BY ANTERIOR APPROACH N/A 8/20/2020    Procedure: FUSION, SPINE, LUMBAR, ANTERIOR APPROACH L5-S1 ALIF Stand Alone;  Surgeon: Jason Caldwell MD;  Location: [facility] OR;  Service: Neurosurgery;  Laterality: N/A;    HEMORRHOID SURGERY      with complication of chronic bleeding for 6 weeks      INJECTION OF STEROID Right 12/6/2018    Procedure: INJECTION, STEROID Right SI Joint Block and Steroid Injection;  Surgeon: Jason Caldwell MD;  Location: [facility] OR;  Service: Neurosurgery;  Laterality: Right;  Procedure: Right SI Joint Block and Steroid Injection  Surgery Time: 30 MIN  LOS: 0  Anesthesia: MAC  Radiology: C-arm  Bed: [place] 4 Poster  Position: Prone    INJECTION OF STEROID Right 9/19/2019    Procedure: INJECTION, STEROID Procedure: Right SI joint block nd steroid injection;  Surgeon: Jason Caldwell MD;  Location: [facility] OR;  Service: Neurosurgery;  Laterality: Right;  Procedure: Right SI joint block nd steroid injection  Surgery Time: 30 mins  LOS:   Anesthesia: General MAC  Radiology:C-arm  Bed: Regular Bed  Position: Prone    IRRIGATION AND DEBRIDEMENT OF UPPER EXTREMITY Left 4/7/2022    Procedure: IRRIGATION AND DEBRIDEMENT, UPPER EXTREMITY;  Surgeon: Kaye Solis MD;  Location: [facility] OR;  Service: Orthopedics;  Laterality: Left;    JOINT REPLACEMENT      KNEE SURGERY      involving arthroscopic surgery to both knees    REPAIR OF EXTENSOR TENDON Left 4/7/2022    Procedure: REPAIR, TENDON, EXTENSOR thumb, EPB and EPL;  Surgeon: Kaye Solis MD;  Location: [facility] OR;  Service: Orthopedics;  Laterality: Left;    SINUS SURGERY      left molar and sinus surgery for trigeminal neuralgia    SPINAL FUSION  06/22/2015    L3-L5 XLIF/TANA    TOTAL HIP ARTHROPLASTY  4/2012    Pt states he had total hip replacement on his left hip.    ULNAR NERVE TRANSPOSITION Left 12/16/2020    Procedure: TRANSPOSITION, NERVE, ULNAR - left carpal and cubital tunnel releases;  Surgeon: Addi Bauer MD;  Location: [facility];  Service: Orthopedics;  Laterality: Left;     FAMILY HISTORY:   Family History   Problem Relation Age of Onset    Stroke Mother 86    Colon cancer Mother         early/mid-60s    Uterine cancer Mother 77    Pancreatitis Brother         acute, now s/p nathalia    Diabetes Brother     Macular  degeneration Brother     Other Brother         foot drop    Other Father 44        pituitary tumor- CA vs benign?    Heart attack Maternal Grandfather         suspected, 50s    Migraines Sister     Crohn's disease Sister         w/ assoc joint issues    Arthritis Sister     Tremor Daughter     Irritable bowel syndrome Son         leaning toward Crohn's dx    Neurological Disorders Son         nonspecific, benign fasciculations of calves    Tremor Son     Jaundice Grandchild     Other Maternal Uncle         memory issue    Other Maternal Uncle         memory issue    Cancer Maternal Cousin         origin? maybe thyroid? 40s?    Melanoma Neg Hx     Amblyopia Neg Hx     Blindness Neg Hx     Cataracts Neg Hx     Glaucoma Neg Hx     Hypertension Neg Hx     Retinal detachment Neg Hx     Strabismus Neg Hx     Thyroid disease Neg Hx     Allergic rhinitis Neg Hx     Allergies Neg Hx     Angioedema Neg Hx     Asthma Neg Hx     Eczema Neg Hx     Urticaria Neg Hx     Rhinitis Neg Hx     Immunodeficiency Neg Hx     Atopy Neg Hx      SOCIAL HISTORY:   Social History     Socioeconomic History    Marital status:    Occupational History    Occupation: Psychotherpist    Tobacco Use    Smoking status: Never    Smokeless tobacco: Never   Substance and Sexual Activity    Alcohol use: Yes     Comment: occasion    Drug use: No    Sexual activity: Yes     Partners: Female     Social Determinants of Health     Financial Resource Strain: Low Risk  (2/1/2024)    Overall Financial Resource Strain (CARDIA)     Difficulty of Paying Living Expenses: Not hard at all   Food Insecurity: No Food Insecurity (2/1/2024)    Hunger Vital Sign     Worried About Running Out of Food in the Last Year: Never true     Ran Out of Food in the Last Year: Never true   Transportation Needs: No Transportation Needs (2/1/2024)    PRAPARE - Transportation     Lack of Transportation (Medical): No     Lack of Transportation (Non-Medical): No   Physical  Activity: Insufficiently Active (2/1/2024)    Exercise Vital Sign     Days of Exercise per Week: 2 days     Minutes of Exercise per Session: 10 min   Stress: No Stress Concern Present (2/1/2024)    [employer] - Occupational Stress Questionnaire     Feeling of Stress : Not at all   Social Connections: Unknown (2/1/2024)    Social Connection and Isolation Panel [NHANES]     Frequency of Communication with Friends and Family: Three times a week     Frequency of Social Gatherings with Friends and Family: Once a week     Active Member of Clubs or Organizations: No     Attends Club or Organization Meetings: Never     Marital Status:    Housing Stability: Low Risk  (2/1/2024)    Housing Stability Vital Sign     Unable to Pay for Housing in the Last Year: No     Number of Places Lived in the Last Year: 1     Unstable Housing in the Last Year: No       MEDICATIONS:   Current Outpatient Medications:     atorvastatin (LIPITOR) 40 MG tablet, Take 1 tablet (40 mg total) by mouth once daily., Disp: 90 tablet, Rfl: 1    butalbital-acetaminophen-caffeine -40 mg (FIORICET, ESGIC) -40 mg per tablet, Take 1 tablet by mouth daily as needed for 30 days., Disp: 15 tablet, Rfl: 0    celecoxib (CELEBREX) 100 MG capsule, Take 2 capsules (200 mg total) by mouth 2 (two) times daily., Disp: 180 capsule, Rfl: 3    clonazePAM (KLONOPIN) 0.5 MG tablet, Take 1 tablet by mouth twice a day as needed for anxiety, Disp: 60 tablet, Rfl: 5    ergocalciferol (VITAMIN D2) 50,000 unit Cap, Take 1 capsule (50,000 Units total) by mouth every 7 days., Disp: 12 capsule, Rfl: 3    fremanezumab-vfrm (AJOVY SYRINGE) 225 mg/1.5 mL injection, Inject 1.5 mLs (225 mg total) into the skin every 28 days., Disp: 1.5 mL, Rfl: 5    HYDROcodone-acetaminophen (NORCO) 5-325 mg per tablet, Take 1 tablet by mouth every 8 (eight) hours as needed for Pain., Disp: 90 tablet, Rfl: 0    HYDROcodone-acetaminophen (NORCO) 5-325 mg per  tablet, Take 1 tablet by mouth 4 (four) times daily as needed for pain., Disp: 120 tablet, Rfl: 0    hydrocortisone-pramoxine (ANALPRAM-HC) 2.5-1 % Crea, Place rectally 3 (three) times daily., Disp: 30 g, Rfl: 2    lamoTRIgine (LAMICTAL) 200 MG tablet, Take 1 tablet (200 mg total) by mouth once daily., Disp: 90 tablet, Rfl: 3    methocarbamoL (ROBAXIN) 750 MG Tab, Take 1 tablet (750 mg total) by mouth 3 (three) times daily., Disp: 60 tablet, Rfl: 2    perfluorohexyloctane, PF, 100 % Drop, Place 1 drop into both eyes 4 (four) times daily., Disp: 3 mL, Rfl: 11    pregabalin (LYRICA) 25 MG capsule, Take 2 capsules (50 mg total) by mouth once daily., Disp: 90 capsule, Rfl: 1    pregabalin (LYRICA) 25 MG capsule, Take 1 capsule (25 mg total) by mouth 2 (two) times a day., Disp: 180 capsule, Rfl: 0    pregabalin (LYRICA) 75 MG capsule, Take 3 capsules by mouth at night (Patient taking differently: Take 2 capsules by mouth at night), Disp: 90 capsule, Rfl: 1    pregabalin (LYRICA) 75 MG capsule, Take 3 capsules (225 mg total) by mouth every evening., Disp: 270 capsule, Rfl: 0    RABEprazole (ACIPHEX) 20 mg tablet, Take 1 tablet (20 mg total) by mouth 2 (two) times daily., Disp: 180 tablet, Rfl: 3    selegiline (EMSAM) 9 mg/24 hr, Place 1 patch onto the skin once daily., Disp: 30 patch, Rfl: 11    tadalafiL (CIALIS) 10 MG tablet, Take 1 tablet (10 mg total) by mouth daily as needed for Erectile Dysfunction., Disp: 30 tablet, Rfl: 11    traZODone (DESYREL) 100 MG tablet, Take 1 tablet (100 mg total) by mouth every evening., Disp: 90 tablet, Rfl: 6    ubrogepant (UBRELVY) 100 mg tablet, Take 1 tablet (100 mg total) by mouth every 2 (two) hours as needed for Migraine. Max 200 mg/day., Disp: 16 tablet, Rfl: 5    clindamycin (CLEOCIN) 150 MG capsule, Take 4 capsules (600 mg total) by mouth 1 hour prior to dental appointment. (Patient not taking: Reported on 4/9/2024), Disp: 12 capsule, Rfl: 1    doxycycline (VIBRAMYCIN) 100 MG  Cap, Take 1 capsule (100 mg total) by mouth twice daily for 2 days, THEN take 1 capsule (100 mg total) by mouth once daily for 28 days. (Patient not taking: Reported on 4/9/2024), Disp: 32 capsule, Rfl: 1    Current Facility-Administered Medications:     onabotulinumtoxina injection 200 Units, 200 Units, Intramuscular, q12 weeks, Macie Pearson FNP, 200 Units at 03/06/24 1120  ALLERGIES:   Review of patient's allergies indicates:   Allergen Reactions    Alphagan [brimonidine]      Patient taking MASO-B Selective Inhibitor Selegiline (Emsam)    Coumadin [warfarin]      itch    Oxycodone      hiccups       Review of Systems   Constitution: Negative. Negative for chills, fever and night sweats.   HENT: Negative for congestion and headaches.    Eyes: Negative for blurred vision, left vision loss and right vision loss.   Cardiovascular: Negative for chest pain and syncope.   Respiratory: Negative for cough and shortness of breath.    Endocrine: Negative for polydipsia, polyphagia and polyuria.   Hematologic/Lymphatic: Negative for bleeding problem. Does not bruise/bleed easily.   Skin: Negative for dry skin, itching and rash.   Musculoskeletal: Negative for falls and muscle weakness.   Gastrointestinal: Negative for abdominal pain and bowel incontinence.   Genitourinary: Negative for bladder incontinence and nocturia.   Neurological: Negative for disturbances in coordination, loss of balance and seizures.   Psychiatric/Behavioral: Negative for depression. The patient does not have insomnia.    Allergic/Immunologic: Negative for hives and persistent infections.     PHYSICAL EXAM:  GEN: A&Ox3, WD WN NAD  HEENT: WNL  CHEST: CTAB, no W/R/R  HEART: RRR, no M/R/G   ABD: Soft, NT ND, BS x4 QUADS  MS: Refer to previous note for detailed MS exam  NEURO: CN II-XII intact       The surgical consent was then reviewed with the patient, who agreed with all the contents of the consent form and it was signed.     PHYSICAL  THERAPY:  He was also instructed regarding physical therapy and will begin on POD#1-3. He was given a copy of the original prescription to schedule. Another copy of this prescription was also faxed to [facility].    POST OP CARE: Instructions were reviewed including care of the wound and dressing after surgery and when he can shower.     PAIN MANAGEMENT: Raffy ORTIZ Sangeeta Corey was instructed regarding the Polar ice unit that will be in place after surgery and his postoperative pain medications.     MEDICATION:  Patient is a chronic pain patient taking Norco 5mg TID. Will contact PCP/pain management doctor to discuss post op pain plan. Patient has had surgery in the past and normally takes oral Dilaudid. Will do Dilaudid 1mg q6h for pain  Zofran 4 mg q 8 hours PRN for nausea and vomiting.  Aspirin 81mg BID x 2 weeks for DVT prophylaxis starting on the evening after surgery.      Post op meds to be delivered bedside prior to discharge. Deliver to family if patient is in surgery at 5pm.     Patient was instructed to purchase and take Colace to counter possible GI side effects of taking opiates.     DVT prophylaxis was discussed with the patient today including risk factors for developing DVTs and history of DVTs. The patient was asked if any specific recommendations were given from the doctor/s that did pre-operative surgical clearance.      If the patient was previously taking 81mg baby aspirin, they were told to not take additional baby aspirin, using the above stated aspirin and to restart the 81mg aspirin daily after completion of the aspirin dose.      Patient was also told to buy over the counter Prilosec medication and take it once daily for GI protection as long as they are taking NSAIDs or Aspirin.     The patient was told that narcotic pain medications may make them drowsy and instructions were given to not sign legal documents, drive or operate heavy machinery, cars, or equipment while under the influence of  narcotic medications.     As there were no other questions to be asked, he was given my business card along with Dr. Up's business card if he has any questions or concerns prior to surgery or in the postop period.

## 2024-04-11 NOTE — H&P
Raffy Langepartha Corey  is here for a completion of his perioperative paperwork. he  Is scheduled to undergo left shoulder arthroscopic rotator cuff repair with biceps tenodesis, possible subacromial decompression  on 4/19/2024.  He is a healthy individual and does need clearance for this procedure.     Pending clearance per PCP Dr. Puentes.    Patient is chronic pain patient who taking Norco 5mg TID. Post operatively in the past he has taking oral Dilaudid. Will reach out to his PCP and pain doctor for post op plan. I have messaged Dr. Puentes and called Dr. Olivarez. Dr. Olivarez saying Dilaudid 1mg q6h hours as needed for pain post operatively for the first week is fine and the patient can resume his normal pain regimen post op.    Risks, indications and benefits of the surgical procedure were discussed with the patient. All questions with regard to surgery, rehab, expected return to functional activities, activities of daily living and recreational endeavors were answered to his satisfaction.    Discussed COVID-19 with the patient, they are aware of our current policies and procedures, were given the option of delaying surgery, and they elect to proceed.    Patient was informed and understands the risks of surgery are greater for patients with a current condition or hx of heart disease, obesity, clotting disorders, recurrent infections, steroid use, current or past smoking, and factors such as sedentary lifestyle and noncompliance with medications, therapy or f/u. The degree of the increased risk is hard to estimate w/ any degree of precision.    Once no other questions were asked, a brief history and physical exam was then performed.    PAST MEDICAL HISTORY:   Past Medical History:   Diagnosis Date    Allergy     Arthritis     Back pain     after trauma beginning in 89    Cataract     Chronic pain     neck and left shoulder    Cluster headache 2013    Colon polyps 2007 2007-2019: TA x5, HP x3    Degenerative disc  disease     Depression     Diverticulitis 12/2013    Dry eye syndrome     Fibromyalgia 2013    GERD (gastroesophageal reflux disease)     Hepatitis 1970's    A    History of prostate biopsy 2002    Hyperlipidemia     Joint pain     Prostate cancer 07/2021    PVD (posterior vitreous detachment)     Salzmann's nodular dystrophy of left eye     Sleep apnea     Thyroid nodule 07/16/2014    Trigeminal neuralgia of left side of face     Tubular adenoma of colon 2007    5 removed [id number]    Visual disturbance 2012    problems after cataract surgery     PAST SURGICAL HISTORY:   Past Surgical History:   Procedure Laterality Date    BACK SURGERY      CARPAL TUNNEL RELEASE Right 5/18/2021    Procedure: RELEASE, CARPAL TUNNEL;  Surgeon: Kaye Solis MD;  Location: [facility];  Service: Orthopedics;  Laterality: Right;    CATARACT EXTRACTION W/  INTRAOCULAR LENS IMPLANT Bilateral     CHOLECYSTECTOMY      COLONOSCOPY N/A 12/17/2019    Normal - Repeat 5yrs    COLONOSCOPY  2007 2007 TA x2, 2011 TA x3, 2014 HP x3, 2019 normal    COSMETIC SURGERY  2/10/2015    Direct mid-forehead brow lift    COSMETIC SURGERY  2/10/2015    Bilateral upper lid blepahroplasty    CYSTOSCOPY WITH URETEROSCOPY, RETROGRADE PYELOGRAPHY, AND INSERTION OF STENT Left 8/19/2020    Procedure: CYSTOSCOPY, WITH RETROGRADE PYELOGRAM AND URETERAL STENT INSERTION;  Surgeon: Katelynn Geogre MD;  Location: [facility] OR;  Service: Urology;  Laterality: Left;    ESOPHAGOGASTRODUODENOSCOPY N/A 12/17/2019    Procedure: ESOPHAGOGASTRODUODENOSCOPY (EGD);  Surgeon: Amadou Hardin MD;  Location: [facility] ([address] [place] [address]);  Service: Endoscopy;  Laterality: N/A;    EYE SURGERY      FUSION OF LUMBAR SPINE BY ANTERIOR APPROACH N/A 8/20/2020    Procedure: FUSION, SPINE, LUMBAR, ANTERIOR APPROACH L5-S1 ALIF Stand Alone;  Surgeon: Jason Caldwell MD;  Location: [facility] OR;  Service: Neurosurgery;  Laterality: N/A;    HEMORRHOID SURGERY      with complication of chronic bleeding for 6 weeks      INJECTION OF STEROID Right 12/6/2018    Procedure: INJECTION, STEROID Right SI Joint Block and Steroid Injection;  Surgeon: Jason Caldwell MD;  Location: [facility] OR;  Service: Neurosurgery;  Laterality: Right;  Procedure: Right SI Joint Block and Steroid Injection  Surgery Time: 30 MIN  LOS: 0  Anesthesia: MAC  Radiology: C-arm  Bed: [place] 4 Poster  Position: Prone    INJECTION OF STEROID Right 9/19/2019    Procedure: INJECTION, STEROID Procedure: Right SI joint block nd steroid injection;  Surgeon: Jason Caldwell MD;  Location: [facility] OR;  Service: Neurosurgery;  Laterality: Right;  Procedure: Right SI joint block nd steroid injection  Surgery Time: 30 mins  LOS:   Anesthesia: General MAC  Radiology:C-arm  Bed: Regular Bed  Position: Prone    IRRIGATION AND DEBRIDEMENT OF UPPER EXTREMITY Left 4/7/2022    Procedure: IRRIGATION AND DEBRIDEMENT, UPPER EXTREMITY;  Surgeon: Kaye Solis MD;  Location: [facility] OR;  Service: Orthopedics;  Laterality: Left;    JOINT REPLACEMENT      KNEE SURGERY      involving arthroscopic surgery to both knees    REPAIR OF EXTENSOR TENDON Left 4/7/2022    Procedure: REPAIR, TENDON, EXTENSOR thumb, EPB and EPL;  Surgeon: Kaye Solis MD;  Location: [facility] OR;  Service: Orthopedics;  Laterality: Left;    SINUS SURGERY      left molar and sinus surgery for trigeminal neuralgia    SPINAL FUSION  06/22/2015    L3-L5 XLIF/TANA    TOTAL HIP ARTHROPLASTY  4/2012    Pt states he had total hip replacement on his left hip.    ULNAR NERVE TRANSPOSITION Left 12/16/2020    Procedure: TRANSPOSITION, NERVE, ULNAR - left carpal and cubital tunnel releases;  Surgeon: Addi Bauer MD;  Location: [facility];  Service: Orthopedics;  Laterality: Left;     FAMILY HISTORY:   Family History   Problem Relation Age of Onset    Stroke Mother 86    Colon cancer Mother         early/mid-60s    Uterine cancer Mother 77    Pancreatitis Brother         acute, now s/p nathalia    Diabetes Brother     Macular  degeneration Brother     Other Brother         foot drop    Other Father 44        pituitary tumor- CA vs benign?    Heart attack Maternal Grandfather         suspected, 50s    Migraines Sister     Crohn's disease Sister         w/ assoc joint issues    Arthritis Sister     Tremor Daughter     Irritable bowel syndrome Son         leaning toward Crohn's dx    Neurological Disorders Son         nonspecific, benign fasciculations of calves    Tremor Son     Jaundice Grandchild     Other Maternal Uncle         memory issue    Other Maternal Uncle         memory issue    Cancer Maternal Cousin         origin? maybe thyroid? 40s?    Melanoma Neg Hx     Amblyopia Neg Hx     Blindness Neg Hx     Cataracts Neg Hx     Glaucoma Neg Hx     Hypertension Neg Hx     Retinal detachment Neg Hx     Strabismus Neg Hx     Thyroid disease Neg Hx     Allergic rhinitis Neg Hx     Allergies Neg Hx     Angioedema Neg Hx     Asthma Neg Hx     Eczema Neg Hx     Urticaria Neg Hx     Rhinitis Neg Hx     Immunodeficiency Neg Hx     Atopy Neg Hx      SOCIAL HISTORY:   Social History     Socioeconomic History    Marital status:    Occupational History    Occupation: Psychotherpist    Tobacco Use    Smoking status: Never    Smokeless tobacco: Never   Substance and Sexual Activity    Alcohol use: Yes     Comment: occasion    Drug use: No    Sexual activity: Yes     Partners: Female     Social Determinants of Health     Financial Resource Strain: Low Risk  (2/1/2024)    Overall Financial Resource Strain (CARDIA)     Difficulty of Paying Living Expenses: Not hard at all   Food Insecurity: No Food Insecurity (2/1/2024)    Hunger Vital Sign     Worried About Running Out of Food in the Last Year: Never true     Ran Out of Food in the Last Year: Never true   Transportation Needs: No Transportation Needs (2/1/2024)    PRAPARE - Transportation     Lack of Transportation (Medical): No     Lack of Transportation (Non-Medical): No   Physical  Activity: Insufficiently Active (2/1/2024)    Exercise Vital Sign     Days of Exercise per Week: 2 days     Minutes of Exercise per Session: 10 min   Stress: No Stress Concern Present (2/1/2024)    [place] [employer] - Occupational Stress Questionnaire     Feeling of Stress : Not at all   Social Connections: Unknown (2/1/2024)    Social Connection and Isolation Panel [NHANES]     Frequency of Communication with Friends and Family: Three times a week     Frequency of Social Gatherings with Friends and Family: Once a week     Active Member of Clubs or Organizations: No     Attends Club or Organization Meetings: Never     Marital Status:    Housing Stability: Low Risk  (2/1/2024)    Housing Stability Vital Sign     Unable to Pay for Housing in the Last Year: No     Number of Places Lived in the Last Year: 1     Unstable Housing in the Last Year: No       MEDICATIONS:   Current Outpatient Medications:     atorvastatin (LIPITOR) 40 MG tablet, Take 1 tablet (40 mg total) by mouth once daily., Disp: 90 tablet, Rfl: 1    butalbital-acetaminophen-caffeine -40 mg (FIORICET, ESGIC) -40 mg per tablet, Take 1 tablet by mouth daily as needed for 30 days., Disp: 15 tablet, Rfl: 0    celecoxib (CELEBREX) 100 MG capsule, Take 2 capsules (200 mg total) by mouth 2 (two) times daily., Disp: 180 capsule, Rfl: 3    clonazePAM (KLONOPIN) 0.5 MG tablet, Take 1 tablet by mouth twice a day as needed for anxiety, Disp: 60 tablet, Rfl: 5    ergocalciferol (VITAMIN D2) 50,000 unit Cap, Take 1 capsule (50,000 Units total) by mouth every 7 days., Disp: 12 capsule, Rfl: 3    fremanezumab-vfrm (AJOVY SYRINGE) 225 mg/1.5 mL injection, Inject 1.5 mLs (225 mg total) into the skin every 28 days., Disp: 1.5 mL, Rfl: 5    HYDROcodone-acetaminophen (NORCO) 5-325 mg per tablet, Take 1 tablet by mouth every 8 (eight) hours as needed for Pain., Disp: 90 tablet, Rfl: 0    HYDROcodone-acetaminophen (NORCO) 5-325 mg per  tablet, Take 1 tablet by mouth 4 (four) times daily as needed for pain., Disp: 120 tablet, Rfl: 0    hydrocortisone-pramoxine (ANALPRAM-HC) 2.5-1 % Crea, Place rectally 3 (three) times daily., Disp: 30 g, Rfl: 2    lamoTRIgine (LAMICTAL) 200 MG tablet, Take 1 tablet (200 mg total) by mouth once daily., Disp: 90 tablet, Rfl: 3    methocarbamoL (ROBAXIN) 750 MG Tab, Take 1 tablet (750 mg total) by mouth 3 (three) times daily., Disp: 60 tablet, Rfl: 2    perfluorohexyloctane, PF, 100 % Drop, Place 1 drop into both eyes 4 (four) times daily., Disp: 3 mL, Rfl: 11    pregabalin (LYRICA) 25 MG capsule, Take 2 capsules (50 mg total) by mouth once daily., Disp: 90 capsule, Rfl: 1    pregabalin (LYRICA) 25 MG capsule, Take 1 capsule (25 mg total) by mouth 2 (two) times a day., Disp: 180 capsule, Rfl: 0    pregabalin (LYRICA) 75 MG capsule, Take 3 capsules by mouth at night (Patient taking differently: Take 2 capsules by mouth at night), Disp: 90 capsule, Rfl: 1    pregabalin (LYRICA) 75 MG capsule, Take 3 capsules (225 mg total) by mouth every evening., Disp: 270 capsule, Rfl: 0    RABEprazole (ACIPHEX) 20 mg tablet, Take 1 tablet (20 mg total) by mouth 2 (two) times daily., Disp: 180 tablet, Rfl: 3    selegiline (EMSAM) 9 mg/24 hr, Place 1 patch onto the skin once daily., Disp: 30 patch, Rfl: 11    tadalafiL (CIALIS) 10 MG tablet, Take 1 tablet (10 mg total) by mouth daily as needed for Erectile Dysfunction., Disp: 30 tablet, Rfl: 11    traZODone (DESYREL) 100 MG tablet, Take 1 tablet (100 mg total) by mouth every evening., Disp: 90 tablet, Rfl: 6    ubrogepant (UBRELVY) 100 mg tablet, Take 1 tablet (100 mg total) by mouth every 2 (two) hours as needed for Migraine. Max 200 mg/day., Disp: 16 tablet, Rfl: 5    clindamycin (CLEOCIN) 150 MG capsule, Take 4 capsules (600 mg total) by mouth 1 hour prior to dental appointment. (Patient not taking: Reported on 4/9/2024), Disp: 12 capsule, Rfl: 1    doxycycline (VIBRAMYCIN) 100 MG  Cap, Take 1 capsule (100 mg total) by mouth twice daily for 2 days, THEN take 1 capsule (100 mg total) by mouth once daily for 28 days. (Patient not taking: Reported on 4/9/2024), Disp: 32 capsule, Rfl: 1    Current Facility-Administered Medications:     onabotulinumtoxina injection 200 Units, 200 Units, Intramuscular, q12 weeks, Macie Pearson FNP, 200 Units at 03/06/24 1120  ALLERGIES:   Review of patient's allergies indicates:   Allergen Reactions    Alphagan [brimonidine]      Patient taking MASO-B Selective Inhibitor Selegiline (Emsam)    Coumadin [warfarin]      itch    Oxycodone      hiccups       Review of Systems   Constitution: Negative. Negative for chills, fever and night sweats.   HENT: Negative for congestion and headaches.    Eyes: Negative for blurred vision, left vision loss and right vision loss.   Cardiovascular: Negative for chest pain and syncope.   Respiratory: Negative for cough and shortness of breath.    Endocrine: Negative for polydipsia, polyphagia and polyuria.   Hematologic/Lymphatic: Negative for bleeding problem. Does not bruise/bleed easily.   Skin: Negative for dry skin, itching and rash.   Musculoskeletal: Negative for falls and muscle weakness.   Gastrointestinal: Negative for abdominal pain and bowel incontinence.   Genitourinary: Negative for bladder incontinence and nocturia.   Neurological: Negative for disturbances in coordination, loss of balance and seizures.   Psychiatric/Behavioral: Negative for depression. The patient does not have insomnia.    Allergic/Immunologic: Negative for hives and persistent infections.     PHYSICAL EXAM:  GEN: A&Ox3, WD WN NAD  HEENT: WNL  CHEST: CTAB, no W/R/R  HEART: RRR, no M/R/G   ABD: Soft, NT ND, BS x4 QUADS  MS: Refer to previous note for detailed MS exam  NEURO: CN II-XII intact       The surgical consent was then reviewed with the patient, who agreed with all the contents of the consent form and it was signed.     PHYSICAL  THERAPY:  He was also instructed regarding physical therapy and will begin on POD#1-3. He was given a copy of the original prescription to schedule. Another copy of this prescription was also faxed to [facility].    POST OP CARE: Instructions were reviewed including care of the wound and dressing after surgery and when he can shower.     PAIN MANAGEMENT: Raffy ORTIZ Sangeeta Corey was instructed regarding the Polar ice unit that will be in place after surgery and his postoperative pain medications.     MEDICATION:  Patient is a chronic pain patient taking Norco 5mg TID. Will contact PCP/pain management doctor to discuss post op pain plan. Patient has had surgery in the past and normally takes oral Dilaudid. Will do Dilaudid 1mg q6h for pain  Zofran 4 mg q 8 hours PRN for nausea and vomiting.  Aspirin 81mg BID x 2 weeks for DVT prophylaxis starting on the evening after surgery.      Post op meds to be delivered bedside prior to discharge. Deliver to family if patient is in surgery at 5pm.     Patient was instructed to purchase and take Colace to counter possible GI side effects of taking opiates.     DVT prophylaxis was discussed with the patient today including risk factors for developing DVTs and history of DVTs. The patient was asked if any specific recommendations were given from the doctor/s that did pre-operative surgical clearance.      If the patient was previously taking 81mg baby aspirin, they were told to not take additional baby aspirin, using the above stated aspirin and to restart the 81mg aspirin daily after completion of the aspirin dose.      Patient was also told to buy over the counter Prilosec medication and take it once daily for GI protection as long as they are taking NSAIDs or Aspirin.     The patient was told that narcotic pain medications may make them drowsy and instructions were given to not sign legal documents, drive or operate heavy machinery, cars, or equipment while under the influence of  narcotic medications.     As there were no other questions to be asked, he was given my business card along with Dr. Up's business card if he has any questions or concerns prior to surgery or in the postop period.

## 2024-04-14 ENCOUNTER — PATIENT MESSAGE (OUTPATIENT)
Dept: ADMINISTRATIVE | Facility: OTHER | Age: 74
End: 2024-04-14
Payer: COMMERCIAL

## 2024-04-16 ENCOUNTER — PATIENT MESSAGE (OUTPATIENT)
Dept: SPORTS MEDICINE | Facility: CLINIC | Age: 74
End: 2024-04-16
Payer: COMMERCIAL

## 2024-04-16 RX ORDER — HYDROMORPHONE HYDROCHLORIDE 2 MG/1
1 TABLET ORAL EVERY 6 HOURS PRN
Qty: 14 TABLET | Refills: 0 | Status: SHIPPED | OUTPATIENT
Start: 2024-04-16 | End: 2024-05-02 | Stop reason: SDUPTHER

## 2024-04-17 ENCOUNTER — PATIENT MESSAGE (OUTPATIENT)
Dept: PREADMISSION TESTING | Facility: HOSPITAL | Age: 74
End: 2024-04-17
Payer: COMMERCIAL

## 2024-04-17 NOTE — ANESTHESIA PAT ROS NOTE
04/17/2024  Raffy Rutherford Jr. is a 74 y.o., male.      Pre-op Assessment    I have reviewed the Patient Summary Reports.       I have reviewed the Medications.     Review of Systems  Anesthesia Hx:  No problems with previous Anesthesia   History of prior surgery of interest to airway management or planning:  Previous anesthesia: Nerve Block, MAC      for 4/7/2022  Repair of tendon left thumb, I&D LUE.  Procedure performed at an [facility].  5/18/2021  Right Carpal Tunnel Release with MAC.  Procedure performed at an [facility].    Denies Personal Hx of Anesthesia complications.                    Social:  Non-Smoker, Social Alcohol Use       Hematology/Oncology:       -- Anemia:               Hematology Comments: Iron deficiency anemia, Genetic disorder      --  Cancer in past history:                 Oncology Comments: Prostate cancer- followed by Urology (active surveillance)     EENT/Dental:   Allergies, Cataract, S/P Extractions W/ Intraocular Lens Implants, visual disturbance, problems after cataract surgery, dry eye syndrome, PVD (Posterior vitreus detachment), Salzmann's nodular dystrophy of left eye, Meibomian gland dysfunction (MGD) of both eyes, Thyroid nodule, sinus surgery left molar and trigeminal surgery          Cardiovascular:  Exercise tolerance: good       Denies CAD.       Denies Angina.     hyperlipidemia  Denies AHUMADA.  ECG has been reviewed. Bilateral carotid artery stenosis, (1-39% bilaterally), RBBB,  Aortic atherosclerosis                           Pulmonary:     Denies Asthma.   Denies Shortness of breath.  Sleep Apnea **Chronic elevation of the right hemidiaphragm**          Education provided regarding risk of obstructive sleep apnea            Renal/:   Denies Chronic Renal Disease. renal calculi  H/O Prostate biopsy, Cystoscopy, Pyelography, Insertion of left ureteral  Stent             Hepatic/GI:     GERD, well controlled  Hepatitis, A H/O Colon polyps/ tubular adenoma, Diverticulitis,  Cholecystectomy, Hemorrhoid surgery- complications-bleeding for 6 weeks          Musculoskeletal:  Arthritis   Superior glenoid labrum lesion of left shoulder,   Biceps tendinitis of left upper extremity,  Osteoarthritis of left shoulder,   Nontraumatic tear of left rotator cuff,   Chronic back, neck, and left shoulder pain, DDD,   Spinal stenosis, lumbar region, without neurogenic claudication,  Displacement of lumbar intervertebral disc without myelopathy,  Compression fracture of T12 vertebra,  Facet joint disease of cervical region,  S/P L5-S1 Fusion with anterior approach, L 3-5 Fusion, joint pain, Left SHARAD, Steroid injection to SI joint, I&D LUE, Arthroscopy of both knees, Repair of tendon left thumb, OP (osteoporosis),   Poor posture,  Impaired gait,  Balance problems           Spine Disorders: lumbar, cervical and thoracic Degenerative disease, Chronic Pain and Disc disease           Neurological:    Denies CVA. Neuromuscular Disease,  Headaches Denies Seizures.    Cluster headaches,  Chronic migraine without aura, with intractable migraine,  with status migrainosus, Fibromyalgia, Trigeminal Neuralgia left side of face,  Carpal Tunnel Release, Left Ulnar Nerve Transposition,   Cervical and lumbar radiculopathy,  Paroxysmal hemicrania,  Paresthesia,   Headache around the eyes,   Bilateral foot-drop,  Idiopathic peripheral neuropathy, Thoracic or lumbosacral neuritis or radiculitis,   Occipital neuralgia,  Sciatic nerve pain            Chronic Pain Syndrome                         Endocrine:  Endocrine Normal Denies Diabetes. Denies Hypothyroidism.          Dermatological:  H/O cosmetic surgery- Brow lift   Psych:   anxiety depression Episode of recurrent major depressive disorder, sleep disturbance              Past Medical History:   Diagnosis Date    Allergy     Arthritis     Back pain      after trauma beginning in 89    Cataract     Chronic pain     neck and left shoulder    Cluster headache 2013    Colon polyps 2007 2007-2019: TA x5, HP x3    Degenerative disc disease     Depression     Diverticulitis 12/2013    Dry eye syndrome     Fibromyalgia 2013    GERD (gastroesophageal reflux disease)     Hepatitis 1970's    A    History of prostate biopsy 2002    Hyperlipidemia     Joint pain     Prostate cancer 07/2021    PVD (posterior vitreous detachment)     Salzmann's nodular dystrophy of left eye     Sleep apnea     Thyroid nodule 07/16/2014    Trigeminal neuralgia of left side of face     Tubular adenoma of colon 2007    5 removed [id number]    Visual disturbance 2012    problems after cataract surgery     Past Surgical History:   Procedure Laterality Date    BACK SURGERY      CARPAL TUNNEL RELEASE Right 5/18/2021    Procedure: RELEASE, CARPAL TUNNEL;  Surgeon: Kaye Solis MD;  Location: [facility];  Service: Orthopedics;  Laterality: Right;    CATARACT EXTRACTION W/  INTRAOCULAR LENS IMPLANT Bilateral     CHOLECYSTECTOMY      COLONOSCOPY N/A 12/17/2019    Normal - Repeat 5yrs    COLONOSCOPY  2007 2007 TA x2, 2011 TA x3, 2014 HP x3, 2019 normal    COSMETIC SURGERY  2/10/2015    Direct mid-forehead brow lift    COSMETIC SURGERY  2/10/2015    Bilateral upper lid blepahroplasty    CYSTOSCOPY WITH URETEROSCOPY, RETROGRADE PYELOGRAPHY, AND INSERTION OF STENT Left 8/19/2020    Procedure: CYSTOSCOPY, WITH RETROGRADE PYELOGRAM AND URETERAL STENT INSERTION;  Surgeon: Katelynn George MD;  Location: [facility];  Service: Urology;  Laterality: Left;    ESOPHAGOGASTRODUODENOSCOPY N/A 12/17/2019    Procedure: ESOPHAGOGASTRODUODENOSCOPY (EGD);  Surgeon: Amadou Hardin MD;  Location: [facility] ([address] [place] [address]);  Service: Endoscopy;  Laterality: N/A;    EYE SURGERY      FUSION OF LUMBAR SPINE BY ANTERIOR APPROACH N/A 8/20/2020    Procedure: FUSION, SPINE, LUMBAR, ANTERIOR APPROACH L5-S1 ALIF Stand Alone;  Surgeon:  Jason Caldwell MD;  Location: [facility] OR;  Service: Neurosurgery;  Laterality: N/A;    HEMORRHOID SURGERY      with complication of chronic bleeding for 6 weeks     INJECTION OF STEROID Right 12/6/2018    Procedure: INJECTION, STEROID Right SI Joint Block and Steroid Injection;  Surgeon: Jason Caldwell MD;  Location: [facility] OR;  Service: Neurosurgery;  Laterality: Right;  Procedure: Right SI Joint Block and Steroid Injection  Surgery Time: 30 MIN  LOS: 0  Anesthesia: MAC  Radiology: C-arm  Bed: Favian 4 Poster  Position: Prone    INJECTION OF STEROID Right 9/19/2019    Procedure: INJECTION, STEROID Procedure: Right SI joint block nd steroid injection;  Surgeon: Jason Caldwell MD;  Location: [facility] OR;  Service: Neurosurgery;  Laterality: Right;  Procedure: Right SI joint block nd steroid injection  Surgery Time: 30 mins  LOS:   Anesthesia: General MAC  Radiology:C-arm  Bed: Regular Bed  Position: Prone    IRRIGATION AND DEBRIDEMENT OF UPPER EXTREMITY Left 4/7/2022    Procedure: IRRIGATION AND DEBRIDEMENT, UPPER EXTREMITY;  Surgeon: Kaye Solis MD;  Location: [facility] OR;  Service: Orthopedics;  Laterality: Left;    JOINT REPLACEMENT      KNEE SURGERY      involving arthroscopic surgery to both knees    REPAIR OF EXTENSOR TENDON Left 4/7/2022    Procedure: REPAIR, TENDON, EXTENSOR thumb, EPB and EPL;  Surgeon: Kaye Solis MD;  Location: [facility];  Service: Orthopedics;  Laterality: Left;    SINUS SURGERY      left molar and sinus surgery for trigeminal neuralgia    SPINAL FUSION  06/22/2015    L3-L5 XLIF/TANA    TOTAL HIP ARTHROPLASTY  4/2012    Pt states he had total hip replacement on his left hip.    ULNAR NERVE TRANSPOSITION Left 12/16/2020    Procedure: TRANSPOSITION, NERVE, ULNAR - left carpal and cubital tunnel releases;  Surgeon: Addi Bauer MD;  Location: [facility];  Service: Orthopedics;  Laterality: Left;       Anesthesia Assessment: Preoperative EQUATION    Planned Procedure: Procedure(s)  (LRB):  DEBRIDEMENT, SHOULDER, ARTHROSCOPIC (Left)  FIXATION, TENDON, BICEPS TENODESIS (Left)  REPAIR, ROTATOR CUFF, ARTHROSCOPIC (Left)  Requested Anesthesia Type:General  Surgeon: GEORGIANA Up MD  Service: Orthopedics  Known or anticipated Date of Surgery:4/19/2024    Surgeon notes: reviewed    Electronic QUestionnaire Assessment completed via nurse interview with patient.        Triage considerations:     The patient has no apparent active cardiac condition (No unstable coronary Syndrome such as severe unstable angina or recent [<1 month] myocardial infarction, decompensated CHF, severe valvular   disease or significant arrhythmia)    Previous anesthesia records:MAC, Nerve block for post-op pain, and No problems    Last PCP note: within 1 month , within [facility]   Subspecialty notes: Neurology, Pain Management, Urology    Other important co-morbidities: GERD, HLD, and WINNIE       EKG 4/5/2024:  Vent. Rate : 066 BPM     Atrial Rate : 066 BPM      P-R Int : 156 ms          QRS Dur : 144 ms       QT Int : 394 ms       P-R-T Axes : 049 -64 029 degrees      QTc Int : 413 ms   Normal sinus rhythm   Left axis deviation   Right bundle branch block   Abnormal ECG   When compared with ECG of 03-AUG-2020 11:51,   Right bundle branch block is now Present   Confirmed by MARINA CASTELLON MD ([id number]) on 4/5/2024 4:06:01 PM       Carotid Ultrasound 10/7/2021:  Impression ========   RIGHT SIDE:   1-39% Right ICA stenosis.   Heterogeneous plaque noted in the right internal carotid artery.   Homogeneous plaque noted in the right common carotid artery.   Antegrade flow noted in the right vertebral artery.     LEFT SIDE:   1-39% Left ICA stenosis.   Heterogeneous plaque noted in the left internal carotid artery.   Antegrade flow noted in the left vertebral artery.        Tests already available:  Results have been reviewed.             Instructions given. (See in Nurse's note)      Optimization:  Anesthesia Preop Clinic Assessment   Not Indicated    Medical Opinion Indicated: Yes, PCP       Sub-specialist consult indicated:  No      Plan:    Consultation:Patient's PCP for a statement of optimization      Patient  has previously scheduled Medical Appointment:    Navigation: No additional tests Scheduled.              Consults scheduled: N/A       Patient is clinically stable-cleared for shoulder surgery per PCP.       Ht: 5'8  Wt: 82.5 kg (181 lb)  BMI: 27.65  Vaccinated

## 2024-04-18 ENCOUNTER — PATIENT MESSAGE (OUTPATIENT)
Dept: OTHER | Facility: OTHER | Age: 74
End: 2024-04-18
Payer: COMMERCIAL

## 2024-04-18 ENCOUNTER — PATIENT OUTREACH (OUTPATIENT)
Dept: OTHER | Facility: OTHER | Age: 74
End: 2024-04-18
Payer: COMMERCIAL

## 2024-04-18 ENCOUNTER — PATIENT MESSAGE (OUTPATIENT)
Dept: SPORTS MEDICINE | Facility: CLINIC | Age: 74
End: 2024-04-18
Payer: COMMERCIAL

## 2024-04-18 NOTE — PROGRESS NOTES
Connected Back: Patient Outreach    Patient re-quested pause from CB program until 6/10/2024, due to shoulder surgery.

## 2024-04-19 ENCOUNTER — ANESTHESIA (OUTPATIENT)
Dept: SURGERY | Facility: HOSPITAL | Age: 74
End: 2024-04-19
Payer: COMMERCIAL

## 2024-04-19 ENCOUNTER — HOSPITAL ENCOUNTER (OUTPATIENT)
Facility: HOSPITAL | Age: 74
Discharge: HOME | End: 2024-04-19
Attending: ORTHOPAEDIC SURGERY | Admitting: ORTHOPAEDIC SURGERY
Payer: COMMERCIAL

## 2024-04-19 VITALS
WEIGHT: 174 LBS | HEART RATE: 74 BPM | HEIGHT: 68 IN | SYSTOLIC BLOOD PRESSURE: 118 MMHG | BODY MASS INDEX: 26.37 KG/M2 | RESPIRATION RATE: 19 BRPM | OXYGEN SATURATION: 98 % | DIASTOLIC BLOOD PRESSURE: 60 MMHG | TEMPERATURE: 98 F

## 2024-04-19 DIAGNOSIS — S46.012A TRAUMATIC COMPLETE TEAR OF LEFT ROTATOR CUFF: ICD-10-CM

## 2024-04-19 DIAGNOSIS — S46.012A TRAUMATIC COMPLETE TEAR OF LEFT ROTATOR CUFF, INITIAL ENCOUNTER: ICD-10-CM

## 2024-04-19 DIAGNOSIS — M67.922 BICEPS TENDINOPATHY, LEFT: ICD-10-CM

## 2024-04-19 PROCEDURE — 29828 SHO ARTHRS SRG BICP TENODSIS: CPT | Mod: 51,LT,, | Performed by: ORTHOPAEDIC SURGERY

## 2024-04-19 PROCEDURE — 37000009 HC ANESTHESIA EA ADD 15 MINS: Performed by: ORTHOPAEDIC SURGERY

## 2024-04-19 PROCEDURE — 27201423 OPTIME MED/SURG SUP & DEVICES STERILE SUPPLY: Performed by: ORTHOPAEDIC SURGERY

## 2024-04-19 PROCEDURE — 29823 SHO ARTHRS SRG XTNSV DBRDMT: CPT | Mod: 51,LT,, | Performed by: ORTHOPAEDIC SURGERY

## 2024-04-19 PROCEDURE — 63600175 PHARM REV CODE 636 W HCPCS: Performed by: ORTHOPAEDIC SURGERY

## 2024-04-19 PROCEDURE — C1713 ANCHOR/SCREW BN/BN,TIS/BN: HCPCS | Performed by: ORTHOPAEDIC SURGERY

## 2024-04-19 PROCEDURE — 25000003 PHARM REV CODE 250: Performed by: PHYSICIAN ASSISTANT

## 2024-04-19 PROCEDURE — 36000710: Performed by: ORTHOPAEDIC SURGERY

## 2024-04-19 PROCEDURE — 25000003 PHARM REV CODE 250: Performed by: ANESTHESIOLOGY

## 2024-04-19 PROCEDURE — 63600175 PHARM REV CODE 636 W HCPCS: Performed by: ANESTHESIOLOGY

## 2024-04-19 PROCEDURE — 71000039 HC RECOVERY, EACH ADD'L HOUR: Performed by: ORTHOPAEDIC SURGERY

## 2024-04-19 PROCEDURE — 71000033 HC RECOVERY, INTIAL HOUR: Performed by: ORTHOPAEDIC SURGERY

## 2024-04-19 PROCEDURE — 63600175 PHARM REV CODE 636 W HCPCS: Performed by: NURSE ANESTHETIST, CERTIFIED REGISTERED

## 2024-04-19 PROCEDURE — 36000711: Performed by: ORTHOPAEDIC SURGERY

## 2024-04-19 PROCEDURE — D9220A PRA ANESTHESIA: Mod: ANES,,, | Performed by: ANESTHESIOLOGY

## 2024-04-19 PROCEDURE — 25000003 PHARM REV CODE 250: Performed by: NURSE ANESTHETIST, CERTIFIED REGISTERED

## 2024-04-19 PROCEDURE — 29828 SHO ARTHRS SRG BICP TENODSIS: CPT | Mod: AS,51,LT, | Performed by: PHYSICIAN ASSISTANT

## 2024-04-19 PROCEDURE — 63600175 PHARM REV CODE 636 W HCPCS: Performed by: PHYSICIAN ASSISTANT

## 2024-04-19 PROCEDURE — 29827 SHO ARTHRS SRG RT8TR CUF RPR: CPT | Mod: LT,,, | Performed by: ORTHOPAEDIC SURGERY

## 2024-04-19 PROCEDURE — D9220A PRA ANESTHESIA: Mod: CRNA,,, | Performed by: NURSE ANESTHETIST, CERTIFIED REGISTERED

## 2024-04-19 PROCEDURE — 29827 SHO ARTHRS SRG RT8TR CUF RPR: CPT | Mod: AS,LT,, | Performed by: PHYSICIAN ASSISTANT

## 2024-04-19 PROCEDURE — 37000008 HC ANESTHESIA 1ST 15 MINUTES: Performed by: ORTHOPAEDIC SURGERY

## 2024-04-19 PROCEDURE — 94761 N-INVAS EAR/PLS OXIMETRY MLT: CPT

## 2024-04-19 PROCEDURE — 71000015 HC POSTOP RECOV 1ST HR: Performed by: ORTHOPAEDIC SURGERY

## 2024-04-19 PROCEDURE — 99900035 HC TECH TIME PER 15 MIN (STAT)

## 2024-04-19 PROCEDURE — 29823 SHO ARTHRS SRG XTNSV DBRDMT: CPT | Mod: AS,51,LT, | Performed by: PHYSICIAN ASSISTANT

## 2024-04-19 DEVICE — BIO-COMPOSITE CRKSCRW 5.5X14.7MM
Type: IMPLANTABLE DEVICE | Site: SHOULDER | Status: FUNCTIONAL
Brand: ARTHREX®

## 2024-04-19 DEVICE — SWIVELOCK, SP BC KL 5.5MM
Type: IMPLANTABLE DEVICE | Site: SHOULDER | Status: FUNCTIONAL
Brand: ARTHREX®

## 2024-04-19 RX ORDER — PROPOFOL 10 MG/ML
VIAL (ML) INTRAVENOUS
Status: DISCONTINUED | OUTPATIENT
Start: 2024-04-19 | End: 2024-04-19

## 2024-04-19 RX ORDER — LIDOCAINE HYDROCHLORIDE 20 MG/ML
INJECTION INTRAVENOUS
Status: DISCONTINUED | OUTPATIENT
Start: 2024-04-19 | End: 2024-04-19

## 2024-04-19 RX ORDER — SODIUM CHLORIDE 0.9 % (FLUSH) 0.9 %
3 SYRINGE (ML) INJECTION
Status: DISCONTINUED | OUTPATIENT
Start: 2024-04-19 | End: 2024-04-19 | Stop reason: HOSPADM

## 2024-04-19 RX ORDER — FENTANYL CITRATE 50 UG/ML
25 INJECTION, SOLUTION INTRAMUSCULAR; INTRAVENOUS EVERY 5 MIN PRN
Status: COMPLETED | OUTPATIENT
Start: 2024-04-19 | End: 2024-04-19

## 2024-04-19 RX ORDER — DEXAMETHASONE SODIUM PHOSPHATE 4 MG/ML
INJECTION, SOLUTION INTRA-ARTICULAR; INTRALESIONAL; INTRAMUSCULAR; INTRAVENOUS; SOFT TISSUE
Status: DISCONTINUED | OUTPATIENT
Start: 2024-04-19 | End: 2024-04-19

## 2024-04-19 RX ORDER — METHOCARBAMOL 500 MG/1
1000 TABLET, FILM COATED ORAL ONCE
Status: COMPLETED | OUTPATIENT
Start: 2024-04-19 | End: 2024-04-19

## 2024-04-19 RX ORDER — SODIUM CHLORIDE 9 MG/ML
INJECTION, SOLUTION INTRAVENOUS CONTINUOUS
Status: DISCONTINUED | OUTPATIENT
Start: 2024-04-19 | End: 2024-04-19 | Stop reason: HOSPADM

## 2024-04-19 RX ORDER — HYDROCODONE BITARTRATE AND ACETAMINOPHEN 5; 325 MG/1; MG/1
1 TABLET ORAL EVERY 6 HOURS PRN
Status: DISCONTINUED | OUTPATIENT
Start: 2024-04-19 | End: 2024-04-19 | Stop reason: HOSPADM

## 2024-04-19 RX ORDER — EPINEPHRINE 1 MG/ML
INJECTION, SOLUTION, CONCENTRATE INTRAVENOUS
Status: DISCONTINUED | OUTPATIENT
Start: 2024-04-19 | End: 2024-04-19 | Stop reason: HOSPADM

## 2024-04-19 RX ORDER — MUPIROCIN 20 MG/G
OINTMENT TOPICAL
Status: DISCONTINUED | OUTPATIENT
Start: 2024-04-19 | End: 2024-04-19 | Stop reason: HOSPADM

## 2024-04-19 RX ORDER — NEOSTIGMINE METHYLSULFATE 0.5 MG/ML
INJECTION, SOLUTION INTRAVENOUS
Status: DISCONTINUED | OUTPATIENT
Start: 2024-04-19 | End: 2024-04-19

## 2024-04-19 RX ORDER — MIDAZOLAM HYDROCHLORIDE 1 MG/ML
1 INJECTION, SOLUTION INTRAMUSCULAR; INTRAVENOUS
Status: DISCONTINUED | OUTPATIENT
Start: 2024-04-19 | End: 2024-04-19 | Stop reason: HOSPADM

## 2024-04-19 RX ORDER — ONDANSETRON HYDROCHLORIDE 2 MG/ML
INJECTION, SOLUTION INTRAVENOUS
Status: DISCONTINUED | OUTPATIENT
Start: 2024-04-19 | End: 2024-04-19

## 2024-04-19 RX ORDER — ROCURONIUM BROMIDE 10 MG/ML
INJECTION, SOLUTION INTRAVENOUS
Status: DISCONTINUED | OUTPATIENT
Start: 2024-04-19 | End: 2024-04-19

## 2024-04-19 RX ORDER — HALOPERIDOL 5 MG/ML
0.5 INJECTION INTRAMUSCULAR EVERY 10 MIN PRN
Status: DISCONTINUED | OUTPATIENT
Start: 2024-04-19 | End: 2024-04-19 | Stop reason: HOSPADM

## 2024-04-19 RX ORDER — FENTANYL CITRATE 50 UG/ML
INJECTION, SOLUTION INTRAMUSCULAR; INTRAVENOUS
Status: DISCONTINUED | OUTPATIENT
Start: 2024-04-19 | End: 2024-04-19

## 2024-04-19 RX ORDER — CELECOXIB 200 MG/1
400 CAPSULE ORAL
Status: COMPLETED | OUTPATIENT
Start: 2024-04-19 | End: 2024-04-19

## 2024-04-19 RX ORDER — KETAMINE HCL IN 0.9 % NACL 50 MG/5 ML
SYRINGE (ML) INTRAVENOUS
Status: DISCONTINUED | OUTPATIENT
Start: 2024-04-19 | End: 2024-04-19

## 2024-04-19 RX ORDER — CEFAZOLIN SODIUM 1 G/3ML
INJECTION, POWDER, FOR SOLUTION INTRAMUSCULAR; INTRAVENOUS
Status: DISCONTINUED | OUTPATIENT
Start: 2024-04-19 | End: 2024-04-19

## 2024-04-19 RX ORDER — ACETAMINOPHEN 500 MG
1000 TABLET ORAL
Status: COMPLETED | OUTPATIENT
Start: 2024-04-19 | End: 2024-04-19

## 2024-04-19 RX ORDER — FAMOTIDINE 10 MG/ML
INJECTION INTRAVENOUS
Status: DISCONTINUED | OUTPATIENT
Start: 2024-04-19 | End: 2024-04-19

## 2024-04-19 RX ORDER — FENTANYL CITRATE 50 UG/ML
100 INJECTION, SOLUTION INTRAMUSCULAR; INTRAVENOUS
Status: DISCONTINUED | OUTPATIENT
Start: 2024-04-19 | End: 2024-04-19 | Stop reason: HOSPADM

## 2024-04-19 RX ORDER — DEXMEDETOMIDINE HYDROCHLORIDE 100 UG/ML
INJECTION, SOLUTION INTRAVENOUS
Status: DISCONTINUED | OUTPATIENT
Start: 2024-04-19 | End: 2024-04-19

## 2024-04-19 RX ADMIN — SODIUM CHLORIDE, SODIUM GLUCONATE, SODIUM ACETATE, POTASSIUM CHLORIDE, MAGNESIUM CHLORIDE, SODIUM PHOSPHATE, DIBASIC, AND POTASSIUM PHOSPHATE: .53; .5; .37; .037; .03; .012; .00082 INJECTION, SOLUTION INTRAVENOUS at 11:04

## 2024-04-19 RX ADMIN — ROCURONIUM BROMIDE 50 MG: 10 INJECTION INTRAVENOUS at 09:04

## 2024-04-19 RX ADMIN — LIDOCAINE HYDROCHLORIDE 100 MG: 20 INJECTION INTRAVENOUS at 09:04

## 2024-04-19 RX ADMIN — PROPOFOL 200 MG: 10 INJECTION, EMULSION INTRAVENOUS at 09:04

## 2024-04-19 RX ADMIN — DEXMEDETOMIDINE 4 MCG: 100 INJECTION, SOLUTION, CONCENTRATE INTRAVENOUS at 09:04

## 2024-04-19 RX ADMIN — SODIUM CHLORIDE: 9 INJECTION, SOLUTION INTRAVENOUS at 07:04

## 2024-04-19 RX ADMIN — FENTANYL CITRATE 50 MCG: 50 INJECTION INTRAMUSCULAR; INTRAVENOUS at 08:04

## 2024-04-19 RX ADMIN — MIDAZOLAM 1 MG: 1 INJECTION INTRAMUSCULAR; INTRAVENOUS at 08:04

## 2024-04-19 RX ADMIN — FAMOTIDINE 20 MG: 10 INJECTION, SOLUTION INTRAVENOUS at 09:04

## 2024-04-19 RX ADMIN — FENTANYL CITRATE 25 MCG: 50 INJECTION INTRAMUSCULAR; INTRAVENOUS at 12:04

## 2024-04-19 RX ADMIN — HYDROCODONE BITARTRATE AND ACETAMINOPHEN 1 TABLET: 5; 325 TABLET ORAL at 01:04

## 2024-04-19 RX ADMIN — DEXMEDETOMIDINE 8 MCG: 100 INJECTION, SOLUTION, CONCENTRATE INTRAVENOUS at 09:04

## 2024-04-19 RX ADMIN — ONDANSETRON 4 MG: 2 INJECTION INTRAMUSCULAR; INTRAVENOUS at 09:04

## 2024-04-19 RX ADMIN — Medication 25 MG: at 09:04

## 2024-04-19 RX ADMIN — GLYCOPYRROLATE 0.6 MG: 0.2 INJECTION, SOLUTION INTRAMUSCULAR; INTRAVENOUS at 11:04

## 2024-04-19 RX ADMIN — FENTANYL CITRATE 50 MCG: 50 INJECTION, SOLUTION INTRAMUSCULAR; INTRAVENOUS at 09:04

## 2024-04-19 RX ADMIN — FENTANYL CITRATE 25 MCG: 50 INJECTION, SOLUTION INTRAMUSCULAR; INTRAVENOUS at 11:04

## 2024-04-19 RX ADMIN — FENTANYL CITRATE 25 MCG: 50 INJECTION INTRAMUSCULAR; INTRAVENOUS at 01:04

## 2024-04-19 RX ADMIN — ACETAMINOPHEN 1000 MG: 500 TABLET ORAL at 07:04

## 2024-04-19 RX ADMIN — CELECOXIB 400 MG: 200 CAPSULE ORAL at 07:04

## 2024-04-19 RX ADMIN — NEOSTIGMINE METHYLSULFATE 4 MG: 0.5 INJECTION INTRAVENOUS at 11:04

## 2024-04-19 RX ADMIN — SODIUM CHLORIDE: 0.9 INJECTION, SOLUTION INTRAVENOUS at 09:04

## 2024-04-19 RX ADMIN — MUPIROCIN: 20 OINTMENT TOPICAL at 07:04

## 2024-04-19 RX ADMIN — CEFAZOLIN 2 G: 330 INJECTION, POWDER, FOR SOLUTION INTRAMUSCULAR; INTRAVENOUS at 09:04

## 2024-04-19 RX ADMIN — METHOCARBAMOL 1000 MG: 500 TABLET ORAL at 12:04

## 2024-04-19 RX ADMIN — DEXAMETHASONE SODIUM PHOSPHATE 8 MG: 4 INJECTION, SOLUTION INTRAMUSCULAR; INTRAVENOUS at 09:04

## 2024-04-19 NOTE — BRIEF OP NOTE
[place] - Surgery ([facility])  Brief Operative Note    Surgery Date: 4/19/2024     Surgeons and Role:     * GEORGIANA Up MD - Primary    Assisting Surgeon: None    Pre-op Diagnosis:  Superior glenoid labrum lesion of left shoulder, initial encounter [S43.432A]  Biceps tendinitis of left upper extremity [M75.22]  Osteoarthritis of left shoulder, unspecified osteoarthritis type [M19.012]  Nontraumatic tear of left rotator cuff, unspecified tear extent [M75.102]    Post-op Diagnosis:  Post-Op Diagnosis Codes:     * Superior glenoid labrum lesion of left shoulder, initial encounter [S43.432A]     * Biceps tendinitis of left upper extremity [M75.22]     * Osteoarthritis of left shoulder, unspecified osteoarthritis type [M19.012]     * Nontraumatic tear of left rotator cuff, unspecified tear extent [M75.102]    Procedure(s) (LRB):  DEBRIDEMENT, SHOULDER, ARTHROSCOPIC (Left)  REPAIR, ROTATOR CUFF, ARTHROSCOPIC (Left)  ARTHROSCOPY,SHOULDER,WITH BICEPS TENODESIS (Left)  ARTHROSCOPY, SHOULDER, WITH SUBACROMIAL SPACE DECOMPRESSION (Left)    Anesthesia: General    Operative Findings: see formal operative note    Estimated Blood Loss: Minimal         Specimens:   Specimen (24h ago, onward)      None              Discharge Note    OUTCOME: Patient tolerated treatment/procedure well without complication and is now ready for discharge.    DISPOSITION: Home or Self Care    FINAL DIAGNOSIS:  <principal problem not specified>    FOLLOWUP: In clinic    DISCHARGE INSTRUCTIONS:    Discharge Procedure Orders   Diet Adult Regular     Keep surgical extremity elevated     Ice to affected area     Notify your health care provider if you experience any of the following:  temperature >100.4     Notify your health care provider if you experience any of the following:  persistent nausea and vomiting or diarrhea     Notify your health care provider if you experience any of the following:  severe uncontrolled pain     Notify your health care  provider if you experience any of the following:  redness, tenderness, or signs of infection (pain, swelling, redness, odor or green/yellow discharge around incision site)     Leave dressing on - Keep it clean, dry, and intact until clinic visit     Weight bearing restrictions (specify):   Order Comments: Nonweightbearing in sling

## 2024-04-19 NOTE — ANESTHESIA RELEASE NOTE
"Anesthesia Release from PACU Note    Patient: Raffy Rutherford JrLuisa    Procedure(s) Performed: Procedure(s) (LRB):  DEBRIDEMENT, SHOULDER, ARTHROSCOPIC (Left)  REPAIR, ROTATOR CUFF, ARTHROSCOPIC (Left)  ARTHROSCOPY,SHOULDER,WITH BICEPS TENODESIS (Left)  ARTHROSCOPY, SHOULDER, WITH SUBACROMIAL SPACE DECOMPRESSION (Left)    Anesthesia type: general    Post pain: Adequate analgesia    Post assessment: no apparent anesthetic complications    Last Vitals: Visit Vitals  /60   Pulse 72   Temp 37.2 °C (99 °F) (Temporal)   Resp 18   Ht 5' 8" (1.727 m)   Wt 78.9 kg (174 lb)   SpO2 (!) 93%   BMI 26.46 kg/m²       Post vital signs: stable    Level of consciousness: awake, alert , and oriented    Nausea/Vomiting: no nausea/no vomiting  Cardiovascular: stable and blood pressure at baseline  "

## 2024-04-19 NOTE — OPERATIVE NOTE ADDENDUM
Certification of Assistant at Surgery       Surgery Date: 4/19/2024     Participating Surgeons:  Surgeons and Role:     * GEORGIANA Up MD - Primary    Procedures:  Procedure(s) (LRB):  DEBRIDEMENT, SHOULDER, ARTHROSCOPIC (Left)  REPAIR, ROTATOR CUFF, ARTHROSCOPIC (Left)  ARTHROSCOPY,SHOULDER,WITH BICEPS TENODESIS (Left)  ARTHROSCOPY, SHOULDER, WITH SUBACROMIAL SPACE DECOMPRESSION (Left)    Assistant Surgeon's Certification of Necessity:  I understand that section 1842 (b) (6) (d) of the Social Security Act generally prohibits Medicare Part B reasonable charge payment for the services of assistants at surgery in teaching hospitals when qualified residents are available to furnish such services. I certify that the services for which payment is claimed were medically necessary, and that no qualified resident was available to perform the services. I further understand that these services are subject to post-payment review by the Medicare carrier.      Steven Voss PA-C    04/19/2024  3:01 PM

## 2024-04-19 NOTE — OP NOTE
[facility]   OPERATIVE REPORT   ORTHOPAEDIC SURGERY   PROVIDER: DR. HE WEST    PATIENT INFORMATION   Raffy Rutherford Jr. 74 y.o. male 1950   MRN: [id number]   LOCATION: [facility]     DATE OF PROCEDURE: 4/19/2024     PREOPERATIVE DIAGNOSES:   Left  1. Shoulder rotator cuff tear   2. Shoulder biceps tendinopathy  3. Shoulder external impingement  4. Shoulder AC joint arthritis    POSTOPERATIVE DIAGNOSES:   Left  1. Shoulder rotator cuff tear, full-thickness supraspinatus with delamination   2. Shoulder biceps tendinopathy  3. Shoulder chondromalacia  4. Shoulder synovitis  5. Shoulder capsulitis     OPERATION:   Left  1. Shoulder arthroscopic rotator cuff repair (CPT 30954)  2. Shoulder arthroscopic biceps tenodesis (CPT 42792)  3. Shoulder arthroscopic extensive debridement (CPT 45648)    4. Shoulder arthroscopic subacromial decompression     SURGEON: HE West MD    ASSISTANTS:   DESTINY Ruiz - First Assist  SMA Genna     First Assistant Duties: A first assistant was necessary for this procedure. Assistance was provided for surgical wound exposure, manipulation, and retractor placement and positioning. There was no qualified resident available for the procedure.      ANESTHESIA: General with interscalene block     ESTIMATED BLOOD LOSS:  Minimal    IMPLANTS:   Implant Name Type Inv. Item Serial No.  Lot No. LRB No. Used Action   ANCHOR BIOCOMP W/3 SUTURES - [id number]  ANCHOR BIOCOMP W/3 SUTURES  [employer] [id number] Left 1 Implanted   ANCHOR SWIVELOCK KL 5.5X24.5MM - [id number]  ANCHOR SWIVELOCK KL 5.5X24.5MM  [employer] [id number] Left 2 Implanted      SPECIMENS: None.    COMPLICATIONS: None.     INTRAOPERATIVE COUNTS: Correct.     PROPHYLACTIC IV ANTIBIOTICS: Given per [facility] Protocol.    INDICATIONS FOR PROCEDURE:   Raffy Rutherford Jr. 74 y.o. male  has been seen and evaluated in the office for continued left shoulder pain and mechanical symptoms.  After  a lengthy discussion and failed nonoperative management, the patient wished to proceed with surgical intervention and was fully informed of risks and benefits.    DETAILS OF PROCEDURE:  After the correct operative site was marked by the operating surgeon, an interscalene block was administered by the anesthesia team.  The patient was then taken to the operating room and placed supine on the operating room table, where the patient underwent general anesthesia by the anesthesia team.  The patient was then rolled into the lateral decubitus position with the operative side up.  A well-padded axillary roll, beanbag and pillows were placed. All pressure points were carefully padded and checked. The upper extremities and both lower extremities were placed in comfortable positions and were also well-padded.      A verbal timeout was confirmed to identify the patient, operative site and planned operative procedure. It was also confirmed the patient had received preoperative IV antibiotic per protocol.     Examination under anesthesia demonstrated: Forward elevation 180 degrees, external rotation with arm to side 70 degrees.    The operative upper extremity was then prepped and draped in the usual sterile fashion.     The Spider arm positioner was implemented with balanced suspension and appropriate landmarks were noted on the skin.  A posterior followed by flores-superior portals were created and systematic examination of the joint revealed the following:      -Biceps tendinitis at the root attachment with partial tearing  -Diffuse synovitis from anterior to posterior with degenerative labral tearing  -Thickened and inflamed capsular tissue over the anterior rotator interval extending distally through the MGHL  -A posterior push-pull maneuver with probing was performed demonstrating an intact subscapularis.  There was some fraying and partial interstitial tearing of the upper border lateral insertion but nearly the entirety  of the lateral footprint otherwise was intact  -The undersurface of the superior cuff was torn with an exposed insertional footprint, anterior at the supraspinatus.  After debridement of the torn flap from the articular side, there was clearly a complete tear of the supraspinatus tendon.  -No loose bodies  -Mild superficial grade 2 delamination of the glenoid articular cartilage, otherwise no glenohumeral focal chondral defects    A shaver was again brought in to clear the field of view and to debride torn labrum and undersurface cuff from anterior to posterior. Extensive synovitis was also removed. Cautery was used to resect the interval to the coracoid and to release capsule through the MGHL. All thickened capsular tissue was released adjacent to the glenoid labrum. Care was taken to protect the axillary nerve during this portion of the procedure.     The biceps was tagged with FiberLink suture before luggage tag configuration and released with curved Lazaro scissors.     The torn cuff undersurface was debrided carefully with a shaver. Starting at the level of the superior glenoid and moving medially, a hand held rasp was used to complete the capsular sided releases. Care was taken to avoid injury to the suprascapular nerve. The cuff footprint was also debrided and lightly decorticated for healing response.  The arthroscopic shaver was used to perform chondroplasty of the glenoid.    Attention was then turned to the subacromial space where significant hypertrophic bursa was encountered. Through an anterolateral working portal, shaver and cautery devices were introduced to clear the subacromial space of bursa and adhesions. Bursal reflections to the deltoid fascia anteriorly and posteriorly were taken down to further expand this space. This created a nice room with a view. The undersurface of the acromion was exposed with cautery to delineate bony anatomy. Systematic examination of this space revealed the  following:    -Subacromial spurring with narrowing of the anterolateral subacromial interval  -Fraying and degeneration of the CA ligament indicative of chronic outlet impingement  -Full thickness cuff tearing anteriorly involving the supraspinatus with partial reverse L-shaped pattern with partial disruption of the anterior cable attachment of the supraspinatus tendon.  There also was delamination present.  Tissue quality was fair.    Bursal sided releases were performed down to the scapular spine. The tuberosity was prepared with the shaver and power rasp through accessory posterolateral and posterior portals while looking from the standard anterolateral portal.  A free Suturetape was passed across the delaminated portion of the tear in horizontal mattress configuration to repair the lower and upper limb portions of the tear were compression and partial repair of the reverse L-shaped portion of the tear.  The tear was then repaired with Fiberwire suture originating from a single triple loaded Corkscrew medial row anchor with sutures passed in horizontal mattress fashion across the tear. The medial row was then tied for a total of 3 knots.  A FiberLink suture also was placed anteriorly for luggage tag control of the anterior margin of the tear and rotator interval region.  The FiberLink suture along with the Corkscrew sutures were then taken laterally to two lateral row Swivelock anchors to complete the transosseous equivalent double row repair configuration.  The FiberLink suture used to previously tag the biceps tendon was incorporated into the anterolateral row anchor to complete the arthroscopic biceps tenodesis portion of the procedure.  The cuff was repaired to its native position on the greater tuberosity without excessive tension. Following repair, probing of the repair site revealed good tissue apposition to the footprint and good construct stability.     Subacromial decompression was completed using  posterior cutting block technique in the standard fashion with a 4.5 mm thalia without difficulty.  The anterior osteophyte was flattened converting the acromion morphology from a type 2 to a type 1.  Confirmation of adequate resection was confirmed while viewing from the lateral portal and referencing from the posterior acromial undersurface.     The shoulder and subacromial space were then irrigated and fluid was extravasated using suction.     All portals were reapproximated using 3-0 Nylon. Xeroform and absorbant mepilex pads were placed to cover all incisions.  A polar care shoulder sleeve was secured followed by a sling with abduction pillow. The patient was then repositioned supine, extubated and taken to the recovery room where the patient arrived in stable condition with the compartments of the arm and forearm soft and good perfusion in all digits.     POSTOPERATIVE PLAN OF CARE:  -Patient will be discharged home according to protocol.  -Physical Therapy: Follow the < 3 cm cuff repair rehab protocol. PROM starts in 1 weeks. AROM starts after 6 weeks. No cuff resistive activity x 12 weeks. No biceps resistive activity x 8 weeks. Sling and pillow immobilization for 6 weeks.  -DVT prophylaxis: ASA 81 mg twice a day x 2 weeks.

## 2024-04-19 NOTE — PROGRESS NOTES
"Informed pt of the swelling to his neck and left chest,shoulder area from fluids instilled in surgery. He voiced understanding. No c/o SOB or feeling like throat is swollem. States throat feels very sore. Tolerating liquids well. States the left side feels like "he got hit with a bat". Will medicate for pain. Pt in no distress.   "

## 2024-04-19 NOTE — PROGRESS NOTES
"Patient chest swelling ( from OR irrigation) and neck swelling has improved. Softer to touch at this time. Patient still c/o  some soreness to the chest area and c/o tingling to left hand which feels "weird." Anesthesia updated with this information. No further orders.   "

## 2024-04-19 NOTE — PROGRESS NOTES
Pt appears to be resting comfortably. Sylwia manzanares on. Pt with eyes closed, wearing his headphones. VSS. Resp even and unlabored. Wife at BS holding pts hand.

## 2024-04-19 NOTE — PLAN OF CARE
Patient is AAO and VSS.  Tolerating PO and states pain is tolerable.  Dressing CDI.  Patient states they are ready for d/c.  IV removed.  Catheter tip intact.  Caregiver at bedside.  Discharge instructions reviewed and copy given to the patient and caregiver.  Questions answered.  Both verbalized understanding.  Medication delivered to bedside. no DME needed.  Sling in place from OR to LUE. Patient wheeled to car by RN/PCT.

## 2024-04-19 NOTE — TRANSFER OF CARE
"Anesthesia Transfer of Care Note    Patient: Raffy Rutherford Jr.    Procedure(s) Performed: Procedure(s) (LRB):  DEBRIDEMENT, SHOULDER, ARTHROSCOPIC (Left)  REPAIR, ROTATOR CUFF, ARTHROSCOPIC (Left)  ARTHROSCOPY,SHOULDER,WITH BICEPS TENODESIS (Left)  ARTHROSCOPY, SHOULDER, WITH SUBACROMIAL SPACE DECOMPRESSION (Left)    Patient location: PACU    Anesthesia Type: general    Transport from OR: Transported from OR on 100% O2 by closed face mask with adequate spontaneous ventilation    Post pain: adequate analgesia    Post assessment: no apparent anesthetic complications and tolerated procedure well    Post vital signs: stable    Level of consciousness: sedated and responds to stimulation    Nausea/Vomiting: no nausea/vomiting    Complications: none    Transfer of care protocol was followed    Last vitals: Visit Vitals  BP (!) 153/72 (BP Location: Right arm, Patient Position: Lying)   Pulse 69   Temp 37.2 °C (99 °F) (Temporal)   Resp 18   Ht 5' 8" (1.727 m)   Wt 78.9 kg (174 lb)   SpO2 96%   BMI 26.46 kg/m²     "

## 2024-04-19 NOTE — ANESTHESIA POSTPROCEDURE EVALUATION
Anesthesia Post Evaluation    Patient: Raffy Rutherford JrLuisa    Procedure(s) Performed: Procedure(s) (LRB):  DEBRIDEMENT, SHOULDER, ARTHROSCOPIC (Left)  REPAIR, ROTATOR CUFF, ARTHROSCOPIC (Left)  ARTHROSCOPY,SHOULDER,WITH BICEPS TENODESIS (Left)  ARTHROSCOPY, SHOULDER, WITH SUBACROMIAL SPACE DECOMPRESSION (Left)    Final Anesthesia Type: general      Patient location during evaluation: PACU  Patient participation: Yes- Able to Participate  Level of consciousness: awake and alert and oriented  Pain management: adequate  Airway patency: patent    PONV status at discharge: No PONV  Anesthetic complications: no      Cardiovascular status: blood pressure returned to baseline and hemodynamically stable  Respiratory status: unassisted  Hydration status: euvolemic  Follow-up not needed.              Vitals Value Taken Time   /59 04/19/24 1416     04/19/24 1458   Pulse 74 04/19/24 1428   Resp 19 04/19/24 1428   SpO2 98 % 04/19/24 1428   Vitals shown include unfiled device data.      Event Time   Out of Recovery 13:56:04         Pain/Tequila Score: Pain Rating Prior to Med Admin: 7 (4/19/2024  1:48 PM)  Pain Rating Post Med Admin: 5 (4/19/2024  2:00 PM)  Tequila Score: 9 (4/19/2024  2:00 PM)           8

## 2024-04-19 NOTE — ANESTHESIA PROCEDURE NOTES
Intubation    Date/Time: 4/19/2024 9:43 AM    Performed by: Cecilia Edge CRNA  Authorized by: Karlie Tom MD    Intubation:     Induction:  Intravenous    Intubated:  Postinduction    Mask Ventilation:  Easy mask    Attempts:  1    Attempted By:  Staff anesthesiologist    Method of Intubation:  Video laryngoscopy    Blade:  Vazquez 3    Laryngeal View Grade: Grade I - full view of cords      Difficult Airway Encountered?: No      Complications:  None    Airway Device:  Oral endotracheal tube    Airway Device Size:  7.5    Style/Cuff Inflation:  Cuffed (inflated to minimal occlusive pressure)    Inflation Amount (mL):  6    Tube secured:  22    Secured at:  The lips    Placement Verified By:  Capnometry    Complicating Factors:  None    Findings Post-Intubation:  BS equal bilateral and atraumatic/condition of teeth unchanged

## 2024-04-19 NOTE — PROGRESS NOTES
"Patient swelling to left chest and neck soft and almost back to baseline. Patient states "I am ready to go." Wife at bedside and feels patient is ready to be d/c to home as well. Patient very talkative and enjoying conversation with staff.   "

## 2024-04-19 NOTE — PLAN OF CARE
Pre op complete. Waiting on anesthesia consent. Pt's wife at bedside; will lock clothes in locker, wife will take phone/shanks. Pt resting comfortably with all questions addressed at this time and call light in reach.

## 2024-04-21 NOTE — ADDENDUM NOTE
Addendum  created 04/21/24 0827 by Karlie Tom MD    Attestation recorded in Intraprocedure, Intraprocedure Attestations filed, Intraprocedure Event edited

## 2024-04-29 ENCOUNTER — PATIENT OUTREACH (OUTPATIENT)
Dept: OTHER | Facility: OTHER | Age: 74
End: 2024-04-29
Payer: COMMERCIAL

## 2024-04-29 ENCOUNTER — PATIENT MESSAGE (OUTPATIENT)
Dept: ADMINISTRATIVE | Facility: OTHER | Age: 74
End: 2024-04-29
Payer: COMMERCIAL

## 2024-04-29 NOTE — PROGRESS NOTES
Connected Back: Patient Outreach    Weekly QNR Flag - Patient paused due to shoulder surgery. Closing out task.

## 2024-05-02 ENCOUNTER — OFFICE VISIT (OUTPATIENT)
Dept: SPORTS MEDICINE | Facility: CLINIC | Age: 74
End: 2024-05-02
Payer: COMMERCIAL

## 2024-05-02 ENCOUNTER — OFFICE VISIT (OUTPATIENT)
Dept: OPHTHALMOLOGY | Facility: CLINIC | Age: 74
End: 2024-05-02
Payer: COMMERCIAL

## 2024-05-02 VITALS
BODY MASS INDEX: 27.54 KG/M2 | SYSTOLIC BLOOD PRESSURE: 112 MMHG | DIASTOLIC BLOOD PRESSURE: 72 MMHG | WEIGHT: 181.69 LBS | HEART RATE: 61 BPM | HEIGHT: 68 IN

## 2024-05-02 DIAGNOSIS — Z96.1 PSEUDOPHAKIA OF BOTH EYES: ICD-10-CM

## 2024-05-02 DIAGNOSIS — M67.922 BICEPS TENDINOPATHY, LEFT: ICD-10-CM

## 2024-05-02 DIAGNOSIS — Z98.890 S/P LEFT ROTATOR CUFF REPAIR: Primary | ICD-10-CM

## 2024-05-02 DIAGNOSIS — H18.452 SALZMANN'S NODULAR DEGENERATION OF CORNEA OF LEFT EYE: ICD-10-CM

## 2024-05-02 DIAGNOSIS — H10.45 CHRONIC ALLERGIC CONJUNCTIVITIS: ICD-10-CM

## 2024-05-02 DIAGNOSIS — S46.012A TRAUMATIC COMPLETE TEAR OF LEFT ROTATOR CUFF, INITIAL ENCOUNTER: ICD-10-CM

## 2024-05-02 DIAGNOSIS — H04.123 DRY EYE SYNDROME OF BOTH EYES: Primary | ICD-10-CM

## 2024-05-02 DIAGNOSIS — H02.886 MEIBOMIAN GLAND DYSFUNCTION (MGD) OF BOTH EYES: ICD-10-CM

## 2024-05-02 DIAGNOSIS — H02.883 MEIBOMIAN GLAND DYSFUNCTION (MGD) OF BOTH EYES: ICD-10-CM

## 2024-05-02 DIAGNOSIS — H04.553 NLDO, ACQUIRED (NASOLACRIMAL DUCT OBSTRUCTION), BILATERAL: ICD-10-CM

## 2024-05-02 PROCEDURE — 99024 POSTOP FOLLOW-UP VISIT: CPT | Mod: S$GLB,,, | Performed by: PHYSICIAN ASSISTANT

## 2024-05-02 PROCEDURE — 1101F PT FALLS ASSESS-DOCD LE1/YR: CPT | Mod: CPTII,S$GLB,, | Performed by: PHYSICIAN ASSISTANT

## 2024-05-02 PROCEDURE — 1101F PT FALLS ASSESS-DOCD LE1/YR: CPT | Mod: CPTII,S$GLB,, | Performed by: OPHTHALMOLOGY

## 2024-05-02 PROCEDURE — 92285 EXTERNAL OCULAR PHOTOGRAPHY: CPT | Mod: S$GLB,,, | Performed by: OPHTHALMOLOGY

## 2024-05-02 PROCEDURE — 3288F FALL RISK ASSESSMENT DOCD: CPT | Mod: CPTII,S$GLB,, | Performed by: OPHTHALMOLOGY

## 2024-05-02 PROCEDURE — 1159F MED LIST DOCD IN RCRD: CPT | Mod: CPTII,S$GLB,, | Performed by: PHYSICIAN ASSISTANT

## 2024-05-02 PROCEDURE — 68840 EXPLORE/IRRIGATE TEAR DUCTS: CPT | Mod: 50,S$GLB,, | Performed by: OPHTHALMOLOGY

## 2024-05-02 PROCEDURE — 1160F RVW MEDS BY RX/DR IN RCRD: CPT | Mod: CPTII,S$GLB,, | Performed by: OPHTHALMOLOGY

## 2024-05-02 PROCEDURE — 1157F ADVNC CARE PLAN IN RCRD: CPT | Mod: CPTII,S$GLB,, | Performed by: PHYSICIAN ASSISTANT

## 2024-05-02 PROCEDURE — 1157F ADVNC CARE PLAN IN RCRD: CPT | Mod: CPTII,S$GLB,, | Performed by: OPHTHALMOLOGY

## 2024-05-02 PROCEDURE — 99999 PR PBB SHADOW E&M-EST. PATIENT-LVL III: CPT | Mod: PBBFAC,,, | Performed by: OPHTHALMOLOGY

## 2024-05-02 PROCEDURE — 1125F AMNT PAIN NOTED PAIN PRSNT: CPT | Mod: CPTII,S$GLB,, | Performed by: PHYSICIAN ASSISTANT

## 2024-05-02 PROCEDURE — 1159F MED LIST DOCD IN RCRD: CPT | Mod: CPTII,S$GLB,, | Performed by: OPHTHALMOLOGY

## 2024-05-02 PROCEDURE — 3078F DIAST BP <80 MM HG: CPT | Mod: CPTII,S$GLB,, | Performed by: PHYSICIAN ASSISTANT

## 2024-05-02 PROCEDURE — 99214 OFFICE O/P EST MOD 30 MIN: CPT | Mod: 25,S$GLB,, | Performed by: OPHTHALMOLOGY

## 2024-05-02 PROCEDURE — 99999 PR PBB SHADOW E&M-EST. PATIENT-LVL III: CPT | Mod: PBBFAC,,, | Performed by: PHYSICIAN ASSISTANT

## 2024-05-02 PROCEDURE — 3074F SYST BP LT 130 MM HG: CPT | Mod: CPTII,S$GLB,, | Performed by: PHYSICIAN ASSISTANT

## 2024-05-02 PROCEDURE — 3288F FALL RISK ASSESSMENT DOCD: CPT | Mod: CPTII,S$GLB,, | Performed by: PHYSICIAN ASSISTANT

## 2024-05-02 RX ORDER — DOXYCYCLINE HYCLATE 50 MG/1
50 CAPSULE ORAL 2 TIMES DAILY
Qty: 60 CAPSULE | Refills: 1 | Status: SHIPPED | OUTPATIENT
Start: 2024-05-02

## 2024-05-02 RX ORDER — HYDROMORPHONE HYDROCHLORIDE 2 MG/1
1 TABLET ORAL EVERY 6 HOURS PRN
Qty: 10 TABLET | Refills: 0 | Status: SHIPPED | OUTPATIENT
Start: 2024-05-02 | End: 2024-06-12

## 2024-05-02 RX ORDER — PREDNISOLONE ACETATE 10 MG/ML
SUSPENSION/ DROPS OPHTHALMIC
Qty: 5 ML | Refills: 0 | Status: SHIPPED | OUTPATIENT
Start: 2024-05-02 | End: 2024-05-15 | Stop reason: SDUPTHER

## 2024-05-02 RX ORDER — LIFITEGRAST 50 MG/ML
1 SOLUTION/ DROPS OPHTHALMIC 2 TIMES DAILY
Qty: 60 EACH | Refills: 11 | Status: SHIPPED | OUTPATIENT
Start: 2024-05-02

## 2024-05-02 NOTE — PROGRESS NOTES
S:Raffy Langepartha Corey presents for post-operative evaluation.     DATE OF PROCEDURE: 4/19/2024      OPERATION:   Left  1. Shoulder arthroscopic rotator cuff repair (CPT 15358)  2. Shoulder arthroscopic biceps tenodesis (CPT 11907)  3. Shoulder arthroscopic extensive debridement (CPT 88993)    4. Shoulder arthroscopic subacromial decompression        Raffy Rutherford Jr. reports to be doing well 2wk s/p the above mentioned procedure. Denies fevers, chills, night sweats, chest pain, difficulty breathing, calf pain or tenderness. Going to PT 2x[facility] at [facility]. Seeing good progress daily. Pain levels are improving. Taking Dilaudid rather than his regular pain regimen of Hydrocodone 5mg three times daily. He does endorse a fall earlier this week. Fell onto his right side onto a chair and did not damage the operative shoulder.    O: The incisions are healing well.  No signs of infection.  Sutures were removed. No significant pain or unusual tenderness.    A/P: He is doing well. Incisions healing well. Ok to shower with incisions uncovered. No submerging at this point. Continue sling adherence. Nothing heavier in his hand than his phone or a coke can. Will refill Dilaudid which he will wean off of and return to [place]. Advised fall precautions. Plan to follow the rehab plan as previously outlined. RTC in 4 weeks.     POSTOPERATIVE PLAN OF CARE:  -Patient will be discharged home according to protocol.  -Physical Therapy: Follow the < 3 cm cuff repair rehab protocol. PROM starts in 1 weeks. AROM starts after 6 weeks. No cuff resistive activity x 12 weeks. No biceps resistive activity x 8 weeks. Sling and pillow immobilization for 6 weeks.  -DVT prophylaxis: ASA 81 mg twice a day x 2 weeks.

## 2024-05-02 NOTE — PROGRESS NOTES
HPI    Raffy Rutherford is a/an 74 y.o. male here for MGD /LL evaluation / discomfort      States OD is in discomfort and excreting mucous and discharge from corners   / lower lids.   Problem has been going on for about up to a year  Has seen no relief with drops, ointment or compresses.  Referred by:   Eye Meds:   REFRESH OS PRN  INVIZIA BARRIE/ GTTS/WIPES  MEIBO OD       Last edited by Edin Ventura on 5/2/2024 10:24 AM.        ROS    Other comments for: Constitutional (doxy)  Last edited by Bryanna Larson MD on 5/2/2024 11:04 AM.        Assessment /Plan     For exam results, see Encounter Report.    Dry eye syndrome of both eyes  -     External Photography - OU - Both Eyes  -     XIIDRA 5 % Dpet; Apply 1 drop to eye 2 (two) times a day.  Dispense: 60 each; Refill: 11  -     prednisoLONE acetate (PRED FORTE) 1 % DrpS; Place 1 drop into both eyes 4 (four) times daily, THEN 1 drop 3 (three) times daily, THEN 1 drop 2 (two) times a day, THEN 1 drop once daily.  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  Dispense: 60 capsule; Refill: 1      The patient is a pleasant 74 male here for evaluation of bilateral irritation much worse on the right than the left.  This has been ongoing over least 1 year.  The patient has a history of obstructive sleep apnea.  He also has a history of bilateral dry eyes.  The patient does have a history of bilateral cataract extraction with placement of posterior chamber intra-ocular lenses.  He does have a history of systemic allergies as well.  There is no prior history of eyelid surgery or eyelid trauma.  He does have a history of bilateral temporal brow lift.       On exam, the patient has lateral meibomian gland dysfunction.  He has mild-to-moderate eyelid laxity.  The corneas are clear bilaterally.  There is no trichiasis.  The bilateral lower eyelid puncta are in good position.  There is trace papillary reaction on the tarsal conjunctiva bilaterally.    Irrigation:  OD:patent lower punctum, canaliculus, and nld with 30 % flow to the nose and 70% reflux  OS:patent lower punctum, canaliculus, and nld with 50% flow to the nose  and 50% reflux    These findings were discussed with the patient.      I think the patient has a multifactorial component contributing to his ocular discomfort and surface irritation.  Continue current regimen of meibo drops, ivizia, and refresh prn.  Start Xiidra 1 drop twice daily to both eyes and warm compresses twice daily.  We will give a prescription for doxycycline 50 mg p.o. b.i.d. for 1 month along with 1 month refill to use after completing course of topical drops.  Recommend course of Pred Forte 1 drop 4 times daily for 1 week followed by 3 times daily for 1 week followed by twice daily for 1 week and then 1 time daily for 1 week and then stop.  At the same time, the patient will use Pataday 1 drop daily to both eyes.      Return in 2 weeks for IOP check with Optometry.      Return to see me in 3 months sooner any worsening

## 2024-05-06 ENCOUNTER — PATIENT OUTREACH (OUTPATIENT)
Dept: OTHER | Facility: OTHER | Age: 74
End: 2024-05-06
Payer: COMMERCIAL

## 2024-05-06 ENCOUNTER — PATIENT MESSAGE (OUTPATIENT)
Dept: REHABILITATION | Facility: HOSPITAL | Age: 74
End: 2024-05-06
Payer: COMMERCIAL

## 2024-05-06 ENCOUNTER — PATIENT MESSAGE (OUTPATIENT)
Dept: OTHER | Facility: OTHER | Age: 74
End: 2024-05-06
Payer: COMMERCIAL

## 2024-05-06 ENCOUNTER — PATIENT MESSAGE (OUTPATIENT)
Dept: INTERNAL MEDICINE | Facility: CLINIC | Age: 74
End: 2024-05-06
Payer: COMMERCIAL

## 2024-05-06 NOTE — PROGRESS NOTES
Connected Back: Patient Outreach    Spoke with patient on 5/6 in regards to notifications while paused in Connected Back Program. He is still receiving emails from system.  to follow up with program  to resolve issue.

## 2024-05-08 ENCOUNTER — TELEPHONE (OUTPATIENT)
Dept: OTOLARYNGOLOGY | Facility: CLINIC | Age: 74
End: 2024-05-08
Payer: COMMERCIAL

## 2024-05-08 ENCOUNTER — PATIENT MESSAGE (OUTPATIENT)
Dept: OPHTHALMOLOGY | Facility: CLINIC | Age: 74
End: 2024-05-08
Payer: COMMERCIAL

## 2024-05-15 ENCOUNTER — OFFICE VISIT (OUTPATIENT)
Dept: OPTOMETRY | Facility: CLINIC | Age: 74
End: 2024-05-15
Payer: COMMERCIAL

## 2024-05-15 DIAGNOSIS — H04.123 DRY EYE SYNDROME OF BOTH EYES: ICD-10-CM

## 2024-05-15 PROCEDURE — 1157F ADVNC CARE PLAN IN RCRD: CPT | Mod: CPTII,S$GLB,, | Performed by: OPTOMETRIST

## 2024-05-15 PROCEDURE — 1159F MED LIST DOCD IN RCRD: CPT | Mod: CPTII,S$GLB,, | Performed by: OPTOMETRIST

## 2024-05-15 PROCEDURE — 1160F RVW MEDS BY RX/DR IN RCRD: CPT | Mod: CPTII,S$GLB,, | Performed by: OPTOMETRIST

## 2024-05-15 PROCEDURE — 99212 OFFICE O/P EST SF 10 MIN: CPT | Mod: S$GLB,,, | Performed by: OPTOMETRIST

## 2024-05-15 PROCEDURE — 99999 PR PBB SHADOW E&M-EST. PATIENT-LVL II: CPT | Mod: PBBFAC,,, | Performed by: OPTOMETRIST

## 2024-05-15 PROCEDURE — 1101F PT FALLS ASSESS-DOCD LE1/YR: CPT | Mod: CPTII,S$GLB,, | Performed by: OPTOMETRIST

## 2024-05-15 PROCEDURE — 3288F FALL RISK ASSESSMENT DOCD: CPT | Mod: CPTII,S$GLB,, | Performed by: OPTOMETRIST

## 2024-05-15 PROCEDURE — 1126F AMNT PAIN NOTED NONE PRSNT: CPT | Mod: CPTII,S$GLB,, | Performed by: OPTOMETRIST

## 2024-05-15 RX ORDER — PREDNISOLONE ACETATE 10 MG/ML
SUSPENSION/ DROPS OPHTHALMIC
Qty: 5 ML | Refills: 0 | Status: SHIPPED | OUTPATIENT
Start: 2024-05-15 | End: 2028-05-13

## 2024-05-15 RX ORDER — CYCLOSPORINE OPHTHALMIC SOLUTION 1 MG/ML
1 SOLUTION/ DROPS OPHTHALMIC 2 TIMES DAILY
Qty: 2 ML | Refills: 11 | Status: SHIPPED | OUTPATIENT
Start: 2024-05-15

## 2024-05-15 NOTE — PROGRESS NOTES
HPI    DLS: 5/02/2024 With Dr. Larson  DLS: 11/20/2023 With Dr. Ocampo    Pt here for 2 week IOP Check;  Pt states he is having some discomfort to his OD.     Meds;  Pred Forte TID OU  Pataday QDAY OU  Xiidra BID OU  Warm Compresses BID OU (trying to do BID)          Last edited by Jackie Ocampo, OD on 5/15/2024  3:25 PM.            Assessment /Plan     For exam results, see Encounter Report.      Dry eye syndrome of both eyes  -    May 2024  External Photography - OU - Both Eyes    Continue with pataday XS QD   Continue with MIEBO QID    -   May 2024 started  XIIDRA 5 % Dpet; Apply 1 drop to eye 2 (two) times a day. : pt reports burning and blurred vision, ringing in ears    Today Swtcih to    cycloSPORINE (VEVYE) 0.1 % Drop; Place 1 drop into both eyes 2 (two) times a day.  Dispense: 2 mL; Refill: 11    -     Continue to taper as prescribed: prednisoLONE acetate (PRED FORTE) 1 % DrpS; Place 1 drop into both eyes 4 (four) times daily, THEN 1 drop 3 (three) times daily, THEN 1 drop 2 (two) times a day, THEN 1 drop once daily.  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

## 2024-05-21 ENCOUNTER — PROCEDURE VISIT (OUTPATIENT)
Dept: NEUROLOGY | Facility: CLINIC | Age: 74
End: 2024-05-21
Payer: COMMERCIAL

## 2024-05-21 VITALS — SYSTOLIC BLOOD PRESSURE: 129 MMHG | HEART RATE: 74 BPM | DIASTOLIC BLOOD PRESSURE: 78 MMHG

## 2024-05-21 DIAGNOSIS — F34.89 OTHER SPECIFIED PERSISTENT MOOD DISORDERS: ICD-10-CM

## 2024-05-21 DIAGNOSIS — G43.709 CHRONIC MIGRAINE WITHOUT AURA WITHOUT STATUS MIGRAINOSUS, NOT INTRACTABLE: Primary | ICD-10-CM

## 2024-05-21 DIAGNOSIS — F33.9 EPISODE OF RECURRENT MAJOR DEPRESSIVE DISORDER, UNSPECIFIED DEPRESSION EPISODE SEVERITY: ICD-10-CM

## 2024-05-21 DIAGNOSIS — G43.909 MIGRAINE WITHOUT STATUS MIGRAINOSUS, NOT INTRACTABLE, UNSPECIFIED MIGRAINE TYPE: ICD-10-CM

## 2024-05-21 PROCEDURE — 64615 CHEMODENERV MUSC MIGRAINE: CPT | Mod: S$GLB,,, | Performed by: NURSE PRACTITIONER

## 2024-05-21 PROCEDURE — 99499 UNLISTED E&M SERVICE: CPT | Mod: S$GLB,,, | Performed by: NURSE PRACTITIONER

## 2024-05-21 NOTE — PROCEDURES
SUBJECTIVE:  Patient ID: Raffy Rutherford Jr.  Chief Complaint: Follow-up and Botulinum Toxin Injection    History of Present Illness:  Raffy Rutherford Jr. is a 74 y.o. male who presents to clinic alone for follow-up of headaches and Botox injections.     Recommendations made at last Office Visit on 3/6/24:  - Botox administered in clinic for Chronic Migraine (see below)   - For migraine prevention - continue ajovy 225 mg SQ monthly   - For acute migraine - ubrelvy 100 mg   - Triptans contraindicated given daily MAOI transdermal patch   - For rescue - has fioricet available  - Given robaxin 750 mg to take up to three times daily as needed for muscle spasm  - RTC in 12 weeks for repeat Botox injections or sooner if needed     05/21/2024- Interval History:  Migraines have been under very good control over the last 12 weeks.  Has seen a slight uptick in migraine frequency over the last week or so.  Patient has continued to achieve a greater than 50% reduction in the frequency of their migraines with continued Botox injections.  Wishes to proceed with today's injections as scheduled.     Otherwise, information below is still accurate and current.     03/06/2024- Interval History:  Migraines had been well controlled until about 6 weeks ago when migraines gradually began to return.  Of note, he is currently 5 weeks overdue for his Botox.  Scents continue to be a significant migraine trigger for him, triggers a migraine to begin behind his eyes and eventually extend towards the occipitalis/traps.  More recently has noticed migraines which start with pain in the left trap, pain will eventually extend into the left occipitalis, temporalis, retro-orbitalis.  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  Patient has continued to achieve a greater than 50% reduction in the frequency of their migraines with continued Botox injections.  Wishes to proceed with today's injections as scheduled.      Otherwise, information  below is still accurate and current.      11/09/2023- Interval History:  Has found perfume scents continue to be a significant trigger for him, as well as lack of sleep.  Patient has continued to achieve a greater than 50% reduction in the frequency of their migraines with continued Botox injections.  Wishes to proceed with today's injections as scheduled.      Otherwise, information below is still accurate and current.      08/24/2023- Interval History:  Migraines continue to be largely well controlled with combination Botox and Ajovy, he is currently three weeks overdue for his Botox and has had more frequent headaches/migraines during this time, indicating to him Botox is effective.  He has not taken any doses fioricet since last visit.  The Botox injections have achieved well over 50%  improvement in the patient's symptoms. Migraines have been reduced at least 7 days per month and the number of cumulative hours suffering with headaches has been reduced at least 100 total hours per month. Today he does have a headache indicating that the Botox has worn off. Frequency of treatment is every 12 weeks unless no response to the treatments, at which time we will discontinue the injections.     Otherwise, information below is still accurate and current.      05/08/2023- Interval History:  Has seen a noticeable improvement in his migraines since starting Botox injections, was down to 1-2 migraines per week, had a few weeks with no migraines.  Migraines were easily managed with over the counter medications.  Began to notice an uptick in migraine frequency 3 weeks ago, sent message via patient portal, was restarted on ajovy 225 mg SQ monthly.  Feels migraines have leveled out since restarting ajovy.  Denies presence of side effects to Botox, is pleased with the results from first round of Botox, does wish to proceed with second round of Botox as scheduled today.      Otherwise, information below is still accurate and  current.      02/13/2023- Interval History:  Headaches continue to occur on a near daily basis with full blown migraines on 12-15 days per month.  He started Ajovy and recently injected third injection, unsure whether or not ajovy was helping.  Seen by outside pain management provider who recommended and prescribed qulipta 10 mg daily, dose increased to 30 mg daily.  For a few days after increasing dose to 30 mg, began to experience issues with insomnia, unsure if this was related to qulipta vs ajovy or combination, but he does feel migraines were better after increasing dose to 30 mg.    Risks, Benefits, and potential side effects of Botox discussed with patient.  Alternative treatments offered.  After thorough discussed regarding the procedure, patient has decided to move forward with initiating Botox injections for Chronic Migraine.  Patient understands we will complete 3 rounds of injections, if after 3 rounds, we do not see a 50% reduction in headaches, we will discontinue Botox injections.      Otherwise, information below is still accurate and current.      History of Present Illness:   72 y.o. male with headaches, chronic LBP, cervical radiculopathy, anxiety, depression, WINNIE, hx of prostate cancer, who presents for evaluation of headaches via virtual visit.  Patient is established with neurology clinic, previous patient of DESTINY Norman and Dr. Millan, he is a new patient to me.    Currently suffering with headaches at least 1-2 times per week each of which last 2-4 days in duration.  Long history of headahces, beginning around 18 yo, had multiple sinus surgeries thinking headaches were sinus related, no improvement.       Headaches are described as a moderate to severe, stabbing/searing pain beginning left retro-orbitalis, extending towards the occipitalis and into his shoulder/left arm/hand.  Headaches can last anywhere from an hour when successfully treated with fioricet or excedrin, but can last up to 4 days in  duration, on average headaches are lasting about a day in duration.  Headaches are often present upon waking.   Associated symptoms include fatigue, crepitus in left shoulder, congested (on left side), more trouble concentrating than norm, photophobia, phonophobia.  Currently experiencing some sort of headache on 20-25/30 days, with migraines occurring on 12-15/30 days.    At last visit with DESTINY Norman, she restarted nurtec 75 mg odt and referred him to me for consideration of Botox. Nurtec every other day was ineffective (though was effective in the past for him).  He was then switched to ajovy 225 mg SQ monthly, was waiting for today's visit to decide whether or not to start using ajovy.   Takes lyrica as prescribed by outside pain management, for chronic back pain.   He is prescribed lamotrigine 200 mg daily for anxiety/depression.  Takes trazodone 50 mg nightly for insomnia.      Notes from DESTINY Norman:  HPI:   Mr. Raffy Rutherford Jr. is a 72 y.o. male presenting for evaluation regarding chronic low back pain. On chart review he was previously seen by Dr. Millan for both bilateral neuropathy and headache pain. He received an EMG with Dr. Millan on 03/15/2022 which showed evidence of L5/S1 radiculopahties, worse on the right; and moderate chronic reinnervation changes noted in the R vastus medialis. His current therapy regimen is Lyrica 200mg QHS. He notes he was previously seen by neurosurgery and instructed to receive an MRI lumbar spine by Dr. Caldwell but was unsure why this was ordered. We discussed that this is likely due to the evidence on the EMG indicating his bilateral foot drop may be due to a lumbar spine issue at L5/S1 level.      Headache History:   Onset- Teenager   Frequency- 5-6 per week  Duration- All day   Location- L eye and radiates to his L jaw and then toward his L shoulder and arm  Associated symptoms- Nasal blockage on the L side, + photophobia,   Denies any aura, denies nausea/vomiting   Alleviating  Factors- Half Fioricet can occasionally relieve his headaches  Aggravating Factors- Smells trigger his headaches and stress/ poor sleep, weather      Of note- patient went to [place] and found in the 3 weeks he was there he only had 2-3 headaches which were easily relieved     He states that he is also concerned regarding his headaches for which he is currently taking Ubrelvy QOD and Fioricet for abortive therapy.       Headache:  Type: iron hot stab like pain   Severity: 9/10  Location: left eye, forehead , cheek into left shoulder and into the back   Triggers: Particular scents, looking at screen for a long time   Frequency: worse in the last week, but otherwise 8-9/month   Duration:  Days if he does not take medication (2-3 days) can have a cycles of headaches for several weeks , sometimes can go 4 weeks without headache   Aura: none   Photophobia: not as much but does endorse turning the lights off or worsening of headache when he looks at his phone  Phonophobia: ++  Nausea/ vomiting: rarely gets nauseous   Aggravating factors: none   Relieving factors: Medications  Inhales an oil- mydab which has given him significant benefit  Fioricet- none   Ubrelvy- worked okay but Nurtec works better  Voltaren- he takes this for joint pains.   Excedrin- none   Aspirin makes tinnitus worse   Norco- 3 times a day - 1/2 pill Norco in am , 1/2 pill in the afternoon 1/2 + 3/4th pill in the evening. Pain worsens when he tries to taper off his pain medication  He snores at night, he has sleep apnea, he was on CPAP but not very compliant because he states he took the mask off in his sleep. He has tried different masks with no benefit.      Treatments Tried and Response  Nurtec  lyrica  Atogepant  Baclofen   Fioricet  Celebrex  Emgality 09/08/2020 failed  Aimovig  Fioricet   Diamox   Cymbalta   Klonopin   Valium   [place]  Lamictal   Gabapentin   Indometacin   Dilaudid  Verapamil  Nurtec   Ubrelvy   Trazodone  Ajovy   Botox -  helping after first round  Qulipta 60 mg - no, caused insomnia   Ajovy - helping     Current Medications:    Current Outpatient Medications:     atorvastatin (LIPITOR) 40 MG tablet, Take 1 tablet (40 mg total) by mouth once daily., Disp: 90 tablet, Rfl: 1    butalbital-acetaminophen-caffeine -40 mg (FIORICET, ESGIC) -40 mg per tablet, Take 1 tablet by mouth daily as needed for 30 days., Disp: 15 tablet, Rfl: 0    celecoxib (CELEBREX) 100 MG capsule, Take 2 capsules (200 mg total) by mouth 2 (two) times daily., Disp: 180 capsule, Rfl: 3    clindamycin (CLEOCIN) 150 MG capsule, Take 4 capsules (600 mg total) by mouth 1 hour prior to dental appointment., Disp: 12 capsule, Rfl: 1    clonazePAM (KLONOPIN) 0.5 MG tablet, Take 1 tablet by mouth twice a day as needed for anxiety, Disp: 60 tablet, Rfl: 5    cycloSPORINE (VEVYE) 0.1 % Drop, Place 1 drop into both eyes 2 (two) times a day., Disp: 2 mL, Rfl: 11    doxycycline 50 MG capsule, Take 1 capsule (50 mg total) by mouth 2 (two) times daily., Disp: 60 capsule, Rfl: 1    ergocalciferol (VITAMIN D2) 50,000 unit Cap, Take 1 capsule (50,000 Units total) by mouth every 7 days., Disp: 12 capsule, Rfl: 3    fremanezumab-vfrm (AJOVY SYRINGE) 225 mg/1.5 mL injection, Inject 1.5 mLs (225 mg total) into the skin every 28 days., Disp: 1.5 mL, Rfl: 5    HYDROcodone-acetaminophen (NORCO) 5-325 mg per tablet, Take 1 tablet by mouth every 8 (eight) hours as needed for Pain., Disp: 90 tablet, Rfl: 0    HYDROcodone-acetaminophen (NORCO) 5-325 mg per tablet, Take 1 tablet by mouth 4 (four) times daily as needed for pain., Disp: 120 tablet, Rfl: 0    HYDROcodone-acetaminophen (NORCO) 5-325 mg per tablet, take 1 tablet by mouth four times daily as needed for 30 days, Disp: 120 tablet, Rfl: 0    hydrocortisone-pramoxine (ANALPRAM-HC) 2.5-1 % Crea, Place rectally 3 (three) times daily., Disp: 30 g, Rfl: 2    HYDROmorphone (DILAUDID) 2 MG tablet, Take 0.5 tablets (1 mg total) by  mouth every 6 (six) hours as needed for Pain (Take HALF of a pill every 4-6 hours as needed for pain)., Disp: 10 tablet, Rfl: 0    lamoTRIgine (LAMICTAL) 200 MG tablet, Take 1 tablet (200 mg total) by mouth once daily., Disp: 90 tablet, Rfl: 3    methocarbamoL (ROBAXIN) 750 MG Tab, Take 1 tablet (750 mg total) by mouth 3 (three) times daily., Disp: 60 tablet, Rfl: 2    perfluorohexyloctane, PF, 100 % Drop, Place 1 drop into both eyes 4 (four) times daily., Disp: 3 mL, Rfl: 11    prednisoLONE acetate (PRED FORTE) 1 % DrpS, Place 1 drop into both eyes 4 (four) times daily, THEN 1 drop 3 (three) times daily, THEN 1 drop 2 (two) times a day, THEN 1 drop once daily., Disp: 5 mL, Rfl: 0    pregabalin (LYRICA) 25 MG capsule, Take 2 capsules (50 mg total) by mouth once daily., Disp: 90 capsule, Rfl: 1    pregabalin (LYRICA) 25 MG capsule, Take 1 capsule (25 mg total) by mouth 2 (two) times a day., Disp: 180 capsule, Rfl: 0    pregabalin (LYRICA) 75 MG capsule, Take 3 capsules by mouth at night (Patient taking differently: Take 2 capsules by mouth at night), Disp: 90 capsule, Rfl: 1    pregabalin (LYRICA) 75 MG capsule, Take 3 capsules (225 mg total) by mouth every evening., Disp: 270 capsule, Rfl: 0    RABEprazole (ACIPHEX) 20 mg tablet, Take 1 tablet (20 mg total) by mouth 2 (two) times daily., Disp: 180 tablet, Rfl: 3    selegiline (EMSAM) 9 mg/24 hr, Place 1 patch onto the skin once daily., Disp: 30 patch, Rfl: 11    tadalafiL (CIALIS) 10 MG tablet, Take 1 tablet (10 mg total) by mouth daily as needed for Erectile Dysfunction., Disp: 30 tablet, Rfl: 11    traZODone (DESYREL) 100 MG tablet, Take 1 tablet (100 mg total) by mouth every evening., Disp: 90 tablet, Rfl: 6    ubrogepant (UBRELVY) 100 mg tablet, Take 1 tablet (100 mg total) by mouth every 2 (two) hours as needed for Migraine. Max 200 mg/day., Disp: 16 tablet, Rfl: 5    XIIDRA 5 % Dpet, Apply 1 drop to eye 2 (two) times a day., Disp: 60 each, Rfl: 11    aspirin  (ECOTRIN) 81 MG EC tablet, Take 1 tablet (81 mg total) by mouth 2 (two) times a day. for 14 days, Disp: 28 tablet, Rfl: 0    ondansetron (ZOFRAN-ODT) 4 MG TbDL, Dissolve 1 tablet (4 mg total) by mouth every 8 (eight) hours as needed (nausea)., Disp: 30 tablet, Rfl: 0    Current Facility-Administered Medications:     onabotulinumtoxina injection 200 Units, 200 Units, Intramuscular, q12 weeks, Macie Pearson, MIKAYLA, 200 Units at 03/06/24 1120    Review of Systems - A review of 10+ systems was conducted with pertinent positive and negative findings documented in HPI with all other systems reviewed and negative.    PFSH: Past medical, family, and social history reviewed as documented in chart with pertinent positive medical, family, and social history detailed in HPI.    OBJECTIVE:  Vitals:  There were no vitals taken for this visit.    Physical Exam:  Constitutional: he appears well-developed and well-nourished. he is well groomed. NAD     Review of Data:   Notes from internal medicine, sports medicine reviewed.   Labs:  Hospital Outpatient Visit on 04/05/2024   Component Date Value Ref Range Status    QRS Duration 04/05/2024 144  ms Final    [facility] QTC Calculation 04/05/2024 413  ms Final   Lab Visit on 04/05/2024   Component Date Value Ref Range Status    Sodium 04/05/2024 136  136 - 145 mmol/L Final    Potassium 04/05/2024 4.1  3.5 - 5.1 mmol/L Final    Chloride 04/05/2024 99  95 - 110 mmol/L Final    CO2 04/05/2024 29  23 - 29 mmol/L Final    Glucose 04/05/2024 118 (H)  70 - 110 mg/dL Final    BUN 04/05/2024 16  8 - 23 mg/dL Final    Creatinine 04/05/2024 0.7  0.5 - 1.4 mg/dL Final    Calcium 04/05/2024 9.2  8.7 - 10.5 mg/dL Final    Anion Gap 04/05/2024 8  8 - 16 mmol/L Final    eGFR 04/05/2024 >60.0  >60 mL/min/1.73 m^2 Final    WBC 04/05/2024 7.85  3.90 - 12.70 K/uL Final    RBC 04/05/2024 4.79  4.60 - 6.20 M/uL Final    Hemoglobin 04/05/2024 15.1  14.0 - 18.0 g/dL Final    Hematocrit 04/05/2024 46.0  40.0 -  54.0 % Final    MCV 04/05/2024 96  82 - 98 fL Final    MCH 04/05/2024 31.5 (H)  27.0 - 31.0 pg Final    MCHC 04/05/2024 32.8  32.0 - 36.0 g/dL Final    RDW 04/05/2024 12.4  11.5 - 14.5 % Final    Platelets 04/05/2024 290  150 - 450 K/uL Final    MPV 04/05/2024 9.9  9.2 - 12.9 fL Final    Immature Granulocytes 04/05/2024 0.3  0.0 - 0.5 % Final    Gran # (ANC) 04/05/2024 5.1  1.8 - 7.7 K/uL Final    Immature Grans (Abs) 04/05/2024 0.02  0.00 - 0.04 K/uL Final    Lymph # 04/05/2024 2.1  1.0 - 4.8 K/uL Final    Mono # 04/05/2024 0.5  0.3 - 1.0 K/uL Final    Eos # 04/05/2024 0.1  0.0 - 0.5 K/uL Final    Baso # 04/05/2024 0.04  0.00 - 0.20 K/uL Final    nRBC 04/05/2024 0  0 /100 WBC Final    Gran % 04/05/2024 64.5  38.0 - 73.0 % Final    Lymph % 04/05/2024 26.8  18.0 - 48.0 % Final    Mono % 04/05/2024 6.8  4.0 - 15.0 % Final    Eosinophil % 04/05/2024 1.1  0.0 - 8.0 % Final    Basophil % 04/05/2024 0.5  0.0 - 1.9 % Final    Differential Method 04/05/2024 Automated   Final   Lab Visit on 01/29/2024   Component Date Value Ref Range Status    PSA Diagnostic 01/29/2024 9.3 (H)  0.00 - 4.00 ng/mL Final    Cholesterol 01/29/2024 188  120 - 199 mg/dL Final    Triglycerides 01/29/2024 253 (H)  30 - 150 mg/dL Final    HDL 01/29/2024 59  40 - 75 mg/dL Final    LDL Cholesterol 01/29/2024 78.4  63.0 - 159.0 mg/dL Final    HDL/Cholesterol Ratio 01/29/2024 31.4  20.0 - 50.0 % Final    Total Cholesterol/HDL Ratio 01/29/2024 3.2  2.0 - 5.0 Final    Non-HDL Cholesterol 01/29/2024 129  mg/dL Final     Imaging:  No results found. However, due to the size of the patient record, not all encounters were searched. Please check Results Review for a complete set of results.    Note: I have independently reviewed any/all imaging/labs/tests and agree with the report (s) as documented.  Any discrepancies will be as noted/demarcated by free text.  GARO BERGER 5/21/2024    ASSESSMENT:  1. Chronic migraine without aura without status migrainosus, not  intractable    2. Migraine without status migrainosus, not intractable, unspecified migraine type    3. Episode of recurrent major depressive disorder, unspecified depression episode severity    4. Other specified persistent mood disorders      PLAN:  - Botox administered in clinic for Chronic Migraine (see below)   - For migraine prevention - continue ajovy 225 mg SQ monthly   - For acute migraine - ubrelvy 100 mg   - Triptans contraindicated given daily MAOI transdermal patch as prescribed by PCP for depression   - For rescue - has fioricet available  - Continue robaxin 750 mg to take up to three times daily as needed for muscle spasm  - RTC in 12 weeks for repeat Botox injections or sooner if needed          All questions and concerns addressed.  Patient verbalizes understanding and is agreeable with the above stated treatment plan.      PROCEDURE NOTE:  BOTOX was performed as an indicated therapy for intractable chronic migraine headaches given that the patient failed more than 2 headache medications    A time out was conducted just before the start of the procedure to verify the correct patient and procedure, procedure location, and all relevant critical information.     The Botox injections have achieved well over 50%  improvement in the patient's symptoms. Migraines have been reduced at least 7 days per month and the number of cumulative hours suffering with headaches has been reduced at least 100 total hours per month. Today she does have a headache indicating that the Botox has worn off. Frequency of treatment is every 12 weeks unless no response to the treatments, at which time we will discontinue the injections.    PROCEDURE PERFORMED: Botulinum toxin injection (73414)  CLINICAL INDICATION: G43.719  After risks and benefits were explained including bleeding, infection, worsening of pain, damage to the areas being injected, weakness of muscles, loss of muscle control, dysphagia if injecting the head or  neck, facial droop if injecting the facial area, painful injection, allergic or other reaction to the medications being injected, and the failure of the procedure to help the problem, a signed consent was obtained.   The patient was placed in a comfortable area and the sites to be treated were identified.The area to be treated was prepped three times with alcohol and the alcohol allowed to dry. Next, a 30 gauge needle was used to inject the medication in the area to be treated.      Total Botox used: 155 Units   Unavoidable waste: 45 Units     Injection sites:    muscle bilaterally ( a total of 10 units divided into 2 sites)   Procerus muscle (5 units)   Frontalis muscle bilaterally (a total of 20 units divided into 4 sites)   Temporalis muscle bilaterally (a total of 40 units divided into 8 sites)   Occipitalis muscle bilaterally (a total of 30 units divided into 6 sites)   Cervical paraspinal muscles (a total of 20 units divided into 4 sites)   Trapezius muscle bilaterally (a total of 30 units divided into 6 sites)   Complications: none   RTC for the next Botox injection: 12 weeks     CC: Haseeb Puentes MD Elizabeth C Vulevich, FNP-C  [facility]   [phone]

## 2024-05-22 ENCOUNTER — TELEPHONE (OUTPATIENT)
Dept: NEUROLOGY | Facility: CLINIC | Age: 74
End: 2024-05-22
Payer: COMMERCIAL

## 2024-05-22 DIAGNOSIS — G43.709 CHRONIC MIGRAINE WITHOUT AURA WITHOUT STATUS MIGRAINOSUS, NOT INTRACTABLE: Primary | ICD-10-CM

## 2024-05-23 RX ORDER — FREMANEZUMAB-VFRM 225 MG/1.5ML
225 INJECTION SUBCUTANEOUS
Qty: 1.5 ML | Refills: 5 | Status: SHIPPED | OUTPATIENT
Start: 2024-05-23

## 2024-05-24 NOTE — PROGRESS NOTES
S:Raffy Langepartha Corey presents for post-operative evaluation.     DATE OF PROCEDURE: 4/19/2024   OPERATION:   Left  1. Shoulder arthroscopic rotator cuff repair (CPT 06183)  2. Shoulder arthroscopic biceps tenodesis (CPT 64718)  3. Shoulder arthroscopic extensive debridement (CPT 55174)    4. Shoulder arthroscopic subacromial decompression      Raffy Rutherford Jr. reports to be doing well 6 weeks s/p the above mentioned procedure.  Physical therapy at [facility].  Doing well overall.  He does have some intermittent pain in the shoulder.  He does report he had a fall about 2 weeks out from surgery.  No reported injury necessarily to the left shoulder.  It sounds like the postop pain is getting better.    O:  Examination of the left shoulder demonstrates well-healed incisions.  No biceps deformity.  Active forward elevation scapular plane to 70-80, passive to 120 with some mild stiffness.  Passive external rotation with arm at side to 40°.  Again, some mild stiffness.  No scapular winging.  No significant swelling.  Motor and sensory intact to the left hand.    A/P:  Arthroscopic pictures reviewed with the patient.  May discontinue sling and pillow and begin the active phase of recovery.  Work on both active and passive range of motion.  Take Celebrex.  No lifting more than a coke can or small book.  Resisted phase of recovery starts a 12 weeks postop.  All questions answered.  Return to clinic in 6 weeks for scheduled follow up.

## 2024-05-30 ENCOUNTER — OFFICE VISIT (OUTPATIENT)
Dept: SPORTS MEDICINE | Facility: CLINIC | Age: 74
End: 2024-05-30
Payer: COMMERCIAL

## 2024-05-30 VITALS
WEIGHT: 182 LBS | HEIGHT: 68 IN | SYSTOLIC BLOOD PRESSURE: 111 MMHG | BODY MASS INDEX: 27.58 KG/M2 | HEART RATE: 70 BPM | DIASTOLIC BLOOD PRESSURE: 72 MMHG

## 2024-05-30 DIAGNOSIS — Z98.890 S/P LEFT ROTATOR CUFF REPAIR: Primary | ICD-10-CM

## 2024-05-30 PROCEDURE — 1125F AMNT PAIN NOTED PAIN PRSNT: CPT | Mod: CPTII,S$GLB,, | Performed by: ORTHOPAEDIC SURGERY

## 2024-05-30 PROCEDURE — 3074F SYST BP LT 130 MM HG: CPT | Mod: CPTII,S$GLB,, | Performed by: ORTHOPAEDIC SURGERY

## 2024-05-30 PROCEDURE — 1159F MED LIST DOCD IN RCRD: CPT | Mod: CPTII,S$GLB,, | Performed by: ORTHOPAEDIC SURGERY

## 2024-05-30 PROCEDURE — 3078F DIAST BP <80 MM HG: CPT | Mod: CPTII,S$GLB,, | Performed by: ORTHOPAEDIC SURGERY

## 2024-05-30 PROCEDURE — 99999 PR PBB SHADOW E&M-EST. PATIENT-LVL III: CPT | Mod: PBBFAC,,, | Performed by: ORTHOPAEDIC SURGERY

## 2024-05-30 PROCEDURE — 1101F PT FALLS ASSESS-DOCD LE1/YR: CPT | Mod: CPTII,S$GLB,, | Performed by: ORTHOPAEDIC SURGERY

## 2024-05-30 PROCEDURE — 99024 POSTOP FOLLOW-UP VISIT: CPT | Mod: S$GLB,,, | Performed by: ORTHOPAEDIC SURGERY

## 2024-05-30 PROCEDURE — 1157F ADVNC CARE PLAN IN RCRD: CPT | Mod: CPTII,S$GLB,, | Performed by: ORTHOPAEDIC SURGERY

## 2024-05-30 PROCEDURE — 3288F FALL RISK ASSESSMENT DOCD: CPT | Mod: CPTII,S$GLB,, | Performed by: ORTHOPAEDIC SURGERY

## 2024-06-05 ENCOUNTER — PATIENT MESSAGE (OUTPATIENT)
Dept: ORTHOPEDICS | Facility: CLINIC | Age: 74
End: 2024-06-05
Payer: COMMERCIAL

## 2024-06-05 ENCOUNTER — PATIENT MESSAGE (OUTPATIENT)
Dept: SPORTS MEDICINE | Facility: CLINIC | Age: 74
End: 2024-06-05
Payer: COMMERCIAL

## 2024-06-06 ENCOUNTER — PATIENT MESSAGE (OUTPATIENT)
Dept: RADIATION ONCOLOGY | Facility: CLINIC | Age: 74
End: 2024-06-06
Payer: COMMERCIAL

## 2024-06-10 ENCOUNTER — PATIENT MESSAGE (OUTPATIENT)
Dept: ORTHOPEDICS | Facility: CLINIC | Age: 74
End: 2024-06-10
Payer: COMMERCIAL

## 2024-06-10 ENCOUNTER — PATIENT MESSAGE (OUTPATIENT)
Dept: INTERNAL MEDICINE | Facility: CLINIC | Age: 74
End: 2024-06-10
Payer: COMMERCIAL

## 2024-06-11 ENCOUNTER — PATIENT MESSAGE (OUTPATIENT)
Dept: OTOLARYNGOLOGY | Facility: CLINIC | Age: 74
End: 2024-06-11
Payer: COMMERCIAL

## 2024-06-12 ENCOUNTER — OFFICE VISIT (OUTPATIENT)
Dept: OTOLARYNGOLOGY | Facility: CLINIC | Age: 74
End: 2024-06-12
Payer: COMMERCIAL

## 2024-06-12 ENCOUNTER — LAB VISIT (OUTPATIENT)
Dept: LAB | Facility: HOSPITAL | Age: 74
End: 2024-06-12
Attending: RADIOLOGY
Payer: COMMERCIAL

## 2024-06-12 DIAGNOSIS — J34.89 NASAL VESTIBULITIS: ICD-10-CM

## 2024-06-12 DIAGNOSIS — H61.23 BILATERAL IMPACTED CERUMEN: Primary | ICD-10-CM

## 2024-06-12 DIAGNOSIS — C61 PROSTATE CANCER: ICD-10-CM

## 2024-06-12 LAB — COMPLEXED PSA SERPL-MCNC: 9 NG/ML (ref 0–4)

## 2024-06-12 PROCEDURE — 84153 ASSAY OF PSA TOTAL: CPT | Performed by: RADIOLOGY

## 2024-06-12 PROCEDURE — 1159F MED LIST DOCD IN RCRD: CPT | Mod: CPTII,S$GLB,, | Performed by: NURSE PRACTITIONER

## 2024-06-12 PROCEDURE — 99999 PR PBB SHADOW E&M-EST. PATIENT-LVL III: CPT | Mod: PBBFAC,,, | Performed by: NURSE PRACTITIONER

## 2024-06-12 PROCEDURE — 1157F ADVNC CARE PLAN IN RCRD: CPT | Mod: CPTII,S$GLB,, | Performed by: NURSE PRACTITIONER

## 2024-06-12 PROCEDURE — 99215 OFFICE O/P EST HI 40 MIN: CPT | Mod: 25,S$GLB,, | Performed by: NURSE PRACTITIONER

## 2024-06-12 PROCEDURE — 36415 COLL VENOUS BLD VENIPUNCTURE: CPT | Performed by: RADIOLOGY

## 2024-06-12 PROCEDURE — 1126F AMNT PAIN NOTED NONE PRSNT: CPT | Mod: CPTII,S$GLB,, | Performed by: NURSE PRACTITIONER

## 2024-06-12 RX ORDER — MUPIROCIN 20 MG/G
OINTMENT TOPICAL 2 TIMES DAILY
Qty: 22 G | Refills: 0 | Status: SHIPPED | OUTPATIENT
Start: 2024-06-12 | End: 2024-06-20

## 2024-06-12 NOTE — PROGRESS NOTES
"Subjective:   Raffy Rutherford Jr. is a 74 y.o. male who was presents for routine ear cleaning. Denies any pain or drainage. He reports the ear fullness he was experiencing last visit has resolved. Today however he reports about 6 months of a "crunching/ crepitus sound in the nose". It can happen when he manipulates his nose, but often he hears it while breathing at night. He denies any nasal drainage, nasal congestion, sinus pressure or hyposmia. Uses Xyzal daily and Afrin PRN.     Past Medical History  He has a past medical history of Allergy, Arthritis, Back pain, Cataract, Chronic pain, Cluster headache, Colon polyps, Degenerative disc disease, Depression, Diverticulitis, Dry eye syndrome, Fibromyalgia, GERD (gastroesophageal reflux disease), Hepatitis, History of prostate biopsy, Hyperlipidemia, Joint pain, Prostate cancer, PVD (posterior vitreous detachment), Salzmann's nodular dystrophy of left eye, Sleep apnea, Thyroid nodule, Trigeminal neuralgia of left side of face, Tubular adenoma of colon, and Visual disturbance.    Past Surgical History  He has a past surgical history that includes Knee surgery; Hemorrhoid surgery; Cholecystectomy; Sinus surgery; Eye surgery; Back surgery; Joint replacement; Total hip arthroplasty (04/2012); Cosmetic surgery (02/10/2015); Cosmetic surgery (02/10/2015); Cataract extraction w/  intraocular lens implant (Bilateral); Spinal fusion (06/22/2015); Injection of steroid (Right, 12/06/2018); Injection of steroid (Right, 09/19/2019); Esophagogastroduodenoscopy (N/A, 12/17/2019); Colonoscopy (N/A, 12/17/2019); Cystoscopy with ureteroscopy, retrograde pyelography, and insertion of stent (Left, 08/19/2020); Fusion of lumbar spine by anterior approach (N/A, 08/20/2020); Ulnar nerve transposition (Left, 12/16/2020); Carpal tunnel release (Right, 05/18/2021); Colonoscopy (2007); Repair of extensor tendon (Left, 04/07/2022); Irrigation and debridement of upper extremity (Left, " 04/07/2022); Arthroscopic debridement of shoulder (Left, 04/19/2024); Arthroscopic repair of rotator cuff of shoulder (Left, 04/19/2024); arthroscopy,shoulder,with biceps tenodesis (Left, 04/19/2024); Arthroscopy of shoulder with decompression of subacromial space (Left, 04/19/2024); and Shoulder surgery.    Family History  His family history includes Arthritis in his sister; Cancer in his maternal cousin; Colon cancer in his mother; Crohn's disease in his sister; Diabetes in his brother; Heart attack in his maternal grandfather; Irritable bowel syndrome in his son; Jaundice in his grandchild; Macular degeneration in his brother; Migraines in his sister; Neurological Disorders in his son; Other in his brother, maternal uncle, and maternal uncle; Other (age of onset: 44) in his father; Pancreatitis in his brother; Stroke (age of onset: 86) in his mother; Tremor in his daughter and son; Uterine cancer (age of onset: 77) in his mother.    Social History  He reports that he has never smoked. He has never used smokeless tobacco. He reports current alcohol use of about 5.0 standard drinks of alcohol per week. He reports that he does not use drugs.    Allergies  He is allergic to alphagan [brimonidine], coumadin [warfarin], and oxycodone.    Medications  He has a current medication list which includes the following prescription(s): atorvastatin, butalbital-acetaminophen-caffeine -40 mg, celecoxib, clindamycin, clonazepam, vevye, doxycycline, ergocalciferol, ajovy syringe, hydrocodone-acetaminophen, hydrocortisone-pramoxine, lamotrigine, methocarbamol, perfluorohexyloctane (pf), prednisolone acetate, pregabalin, pregabalin, pregabalin, pregabalin, rabeprazole, selegiline, trazodone, ubrogepant, xiidra, hydrocodone-acetaminophen, hydrocodone-acetaminophen, hydromorphone, mupirocin, and tadalafil, and the following Facility-Administered Medications: onabotulinumtoxina.  Review of Systems  Objective:     Constitutional:    He is oriented to person, place, and time. He appears well-developed and well-nourished. He appears alert. He is cooperative.  Non-toxic appearance. He does not have a sickly appearance. He does not appear ill. Normal speech.      Head:  Normocephalic and atraumatic. Not macrocephalic and not microcephalic. Head is without abrasion, without right periorbital erythema, without left periorbital erythema and without TMJ tenderness.     Ears:    Right Ear: No drainage, swelling or tenderness. No mastoid tenderness. Tympanic membrane is not scarred, not perforated, not erythematous, not retracted and not bulging. No middle ear effusion.   Left Ear: No drainage, swelling or tenderness. No mastoid tenderness. Tympanic membrane is not scarred, not perforated, not erythematous, not retracted and not bulging.  No middle ear effusion.   Ceruminous debris obstructing bilateral TMs removed under microscopy    Nose:  Rhinorrhea present. No mucosal edema or polyps.  No foreign bodies. Turbinates normal and no turbinate hypertrophy.    Crusting with hardened yellow drainage to bilateral nasal cavities    Pulmonary/Chest:   Effort normal.     Psychiatric:   He has a normal mood and affect. His speech is normal and behavior is normal.     Neurological:   He is alert and oriented to person, place, and time.     Procedure  Cerumen removal performed.  See procedure note.    Procedure Note:  The patient was brought to the minor procedure room and placed under the operating microscope of the right ear canal which was cleaned of ceruminous debris. Using a combination of suction, curettes and cup forceps the patient's cerumen was removed. The patient tolerated the procedure well. There were no complications.   Procedure Note:  The patient was brought to the minor procedure room and placed under the operating microscope of the left ear canal which was cleaned of ceruminous debris. Using a combination of suction, curettes and cup forceps  the patient's cerumen was removed. The patient tolerated the procedure well. There were no complications.    Assessment:     1. Bilateral impacted cerumen    2. Nasal vestibulitis      Plan:     Bilateral impacted cerumen  Cerumen impaction removed under microscopy. Patient tolerated procedure well.     Nasal vestibulitis  Physical exam and HPI consistent with nasal vestibulitis.  Discussed common causes and symptoms, as well as detailed instructions about medication administration. Follow-up with DESTINY Espinosa for further evaluation if crunching sound does not improve.   -     mupirocin (BACTROBAN) 2 % ointment; Apply topically 2 (two) times daily. for 7 days    Was also having nasal dryness, he will take a break from Xyzal and add nasal saline spray to see if this helps.

## 2024-06-17 ENCOUNTER — OFFICE VISIT (OUTPATIENT)
Dept: ORTHOPEDICS | Facility: CLINIC | Age: 74
End: 2024-06-17
Payer: COMMERCIAL

## 2024-06-17 DIAGNOSIS — M65.4 DE QUERVAIN'S TENOSYNOVITIS, LEFT: Primary | ICD-10-CM

## 2024-06-17 DIAGNOSIS — G89.29 CHRONIC PAIN OF LEFT WRIST: ICD-10-CM

## 2024-06-17 DIAGNOSIS — M25.532 CHRONIC PAIN OF LEFT WRIST: ICD-10-CM

## 2024-06-17 PROCEDURE — 3288F FALL RISK ASSESSMENT DOCD: CPT | Mod: CPTII,S$GLB,, | Performed by: ORTHOPAEDIC SURGERY

## 2024-06-17 PROCEDURE — 1101F PT FALLS ASSESS-DOCD LE1/YR: CPT | Mod: CPTII,S$GLB,, | Performed by: ORTHOPAEDIC SURGERY

## 2024-06-17 PROCEDURE — 99999 PR PBB SHADOW E&M-EST. PATIENT-LVL IV: CPT | Mod: PBBFAC,,, | Performed by: ORTHOPAEDIC SURGERY

## 2024-06-17 PROCEDURE — 1125F AMNT PAIN NOTED PAIN PRSNT: CPT | Mod: CPTII,S$GLB,, | Performed by: ORTHOPAEDIC SURGERY

## 2024-06-17 PROCEDURE — 1157F ADVNC CARE PLAN IN RCRD: CPT | Mod: CPTII,S$GLB,, | Performed by: ORTHOPAEDIC SURGERY

## 2024-06-17 PROCEDURE — 1159F MED LIST DOCD IN RCRD: CPT | Mod: CPTII,S$GLB,, | Performed by: ORTHOPAEDIC SURGERY

## 2024-06-17 PROCEDURE — 20550 NJX 1 TENDON SHEATH/LIGAMENT: CPT | Mod: LT,S$GLB,, | Performed by: ORTHOPAEDIC SURGERY

## 2024-06-17 PROCEDURE — 99214 OFFICE O/P EST MOD 30 MIN: CPT | Mod: 25,S$GLB,, | Performed by: ORTHOPAEDIC SURGERY

## 2024-06-17 PROCEDURE — 1160F RVW MEDS BY RX/DR IN RCRD: CPT | Mod: CPTII,S$GLB,, | Performed by: ORTHOPAEDIC SURGERY

## 2024-06-17 RX ADMIN — METHYLPREDNISOLONE ACETATE 40 MG: 40 INJECTION, SUSPENSION INTRA-ARTICULAR; INTRALESIONAL; INTRAMUSCULAR; SOFT TISSUE at 01:06

## 2024-06-17 NOTE — PROGRESS NOTES
Raffy Rutherford Jr. presents for follow up evaluation of   Encounter Diagnoses   Name Primary?    Left wrist pain Yes    De Quervain's tenosynovitis, left      History of Present Illness       He reports that he has been waking up at night with left radial wrist pain.  He denies any trauma to the wrist.  Ices the wrists and takes Tylenol which helps.  He also reports some achy pain in the left index and long MP joints.  He reports right small finger tenderness.  Vitals:    06/17/24 1336   PainSc:   7   PainLoc: Hand       PE:    AA&O x 4.  NAD  HEENT:  NCAT, sclera nonicteric  Lungs:  Respirations are equal and unlabored.  CV:  2+ bilateral upper and lower extremity pulses.  MSK:   Physical Exam    Positive Finkelstein's test left wrist, positive what test, mild tenderness to palpation left index and long MP joints, right small finger Dupuytren's disease, Neurovascularly intact bilaterally.  5/5 thenar and intrinsic musculature strength.  Full range of motion hands, wrists and elbows.    Diagnostic studies and other clinical records review:  Results       Assessment/Plan:   Encounter Diagnoses   Name Primary?    Left wrist pain Yes    De Quervain's tenosynovitis, left      Assessment & Plan       The patient and I had a thorough discussion today. We discussed the working diagnosis as well as several other potential alternative diagnoses. Treatment options were discussed, both conservative and surgical. Conservative treatment options would include things such as activity modifications, workplace modifications, a period of rest, oral vs topical OTC and prescription anti-inflammatory medications, occupational therapy, splinting/bracing, immobilization, corticosteroid injections, and others. Surgical options were discussed as well.  I have recommended a left wrist brace.    -I have offered him a selective injection. I have explained the risks, benefits, and alternatives of the procedure in detail.  The patient voices  understanding and all questions have been answered. The patient agrees to proceed as planned. So after a sterile prep of the skin in the normal fashion the Left wrist 1st dorsal compartment was injected using a 25 gauge needle with a combination of 1cc 1% plain lidocaine and 40 mg of methylprednisolone.  The patient is cautioned and immediate relief of pain is secondary to the local anesthetic and will be temporary.  After the anesthetic wears off there may be a increase in pain that may last for a few hours or a few days and they should use ice to help alleviate this flair up of pain. Patient tolerated the procedure well.    F/U in 2-3 weeks virtual visit  Call with any questions/concerns in the interim           Kaye Solis MD    Please be aware that this note has been generated with the assistance of ngmoco voice-to-text.  Please excuse any spelling or grammatical errors.    This note was generated with the assistance of ambient listening technology. Verbal consent was obtained by the patient and accompanying visitor(s) for the recording of patient appointment to facilitate this note. I attest to having reviewed and edited the generated note for accuracy, though some syntax or spelling errors may persist. Please contact the author of this note for any clarification.

## 2024-06-18 ENCOUNTER — OFFICE VISIT (OUTPATIENT)
Dept: RADIATION ONCOLOGY | Facility: CLINIC | Age: 74
End: 2024-06-18
Payer: COMMERCIAL

## 2024-06-18 DIAGNOSIS — C61 PROSTATE CANCER: Primary | ICD-10-CM

## 2024-06-18 PROCEDURE — 99212 OFFICE O/P EST SF 10 MIN: CPT | Mod: 95,,, | Performed by: RADIOLOGY

## 2024-06-18 PROCEDURE — 1160F RVW MEDS BY RX/DR IN RCRD: CPT | Mod: CPTII,95,, | Performed by: RADIOLOGY

## 2024-06-18 PROCEDURE — 1159F MED LIST DOCD IN RCRD: CPT | Mod: CPTII,95,, | Performed by: RADIOLOGY

## 2024-06-18 PROCEDURE — 1157F ADVNC CARE PLAN IN RCRD: CPT | Mod: CPTII,95,, | Performed by: RADIOLOGY

## 2024-06-18 NOTE — PROGRESS NOTES
The patient location is: home  The chief complaint leading to consultation is: prostate cancer     Visit type: audiovisual    Face to Face time with patient: 10 minutes   15 minutes of total time spent on the encounter, which includes face to face time and non-face to face time preparing to see the patient (eg, review of tests), Obtaining and/or reviewing separately obtained history, Documenting clinical information in the electronic or other health record, Independently interpreting results (not separately reported) and communicating results to the patient/family/caregiver, or Care coordination (not separately reported).     Each patient to whom he or she provides medical services by telemedicine is:  (1) informed of the relationship between the physician and patient and the respective role of any other health care provider with respect to management of the patient; and (2) notified that he or she may decline to receive medical services by telemedicine and may withdraw from such care at any time.    [facility] [place] - Radiation Oncology     Patient ID: Raffy Rutherford Jr. is a 74 y.o. male.    Chief Complaint: No chief complaint on file.    Mr. Rutherford has a history clinical stage IIB (T1c, N0, M0, PSA < 10, GG2) prostate cancer.  He presented for further evaluation after repeat PSA on 5/25/21 returned at 8.2 ng/ml.  MRI on 5/25/21 revealed a 46.7 cc prostate with a 1.3 cm T2 hypointense lesion in the Rt. mid gland, PI-RADS 4.  There was no extraprostatic extension.  The seminal vesicles and neurovascular bundles were unremarkable.  Biopsies on 6/15/21 revealed Daniel 7 (3+4) adenocarcinoma involving 29% of 4 of 6 cores from the targeted lesion in the Rt. apex.  The Daniel pattern 4 accounted for 10 - 20% of the tumor.  There was a small focus of atypical glands at the Lt. apex but the remaining biopsies revealed benign prostatic tissue.  Polaris recurrence score returned at 3.2 which is at the  borderline of active surveillance and single modality treatment.   He has continued with active surveillance.  Today the patient states he feels well.     Review of Systems   Constitutional:  Negative for activity change, appetite change, chills and fatigue.   Gastrointestinal:  Negative for constipation and diarrhea.   Genitourinary:  Negative for bladder incontinence, difficulty urinating and dysuria.     Physical Exam  Constitutional:       General: He is not in acute distress.     Appearance: Normal appearance.   Neurological:      Mental Status: He is alert.   Psychiatric:         Mood and Affect: Mood normal.         Judgment: Judgment normal.        Latest Reference Range & Units 08/07/23 09:39 08/25/23 11:35 01/29/24 14:20 06/12/24 09:40   PSA Diagnostic 0.00 - 4.00 ng/mL 10.7 (H) 11.1 (H) 9.3 (H) 9.0 (H)   (H): Data is abnormally high     Assessment and Plan    Prostate cancer  Doing well  no evidence of PSA progression.  Will plan to repeat his MRI of the prostate in September.  Plan follow up after MRI.

## 2024-06-25 ENCOUNTER — OFFICE VISIT (OUTPATIENT)
Dept: OTOLARYNGOLOGY | Facility: CLINIC | Age: 74
End: 2024-06-25
Payer: COMMERCIAL

## 2024-06-25 VITALS
HEART RATE: 57 BPM | BODY MASS INDEX: 27.76 KG/M2 | SYSTOLIC BLOOD PRESSURE: 134 MMHG | WEIGHT: 182.56 LBS | DIASTOLIC BLOOD PRESSURE: 78 MMHG

## 2024-06-25 DIAGNOSIS — J34.89 NASAL VESTIBULITIS: ICD-10-CM

## 2024-06-25 DIAGNOSIS — J31.0 RHINITIS SICCA: Primary | ICD-10-CM

## 2024-06-25 PROCEDURE — 3075F SYST BP GE 130 - 139MM HG: CPT | Mod: CPTII,S$GLB,, | Performed by: PHYSICIAN ASSISTANT

## 2024-06-25 PROCEDURE — 31231 NASAL ENDOSCOPY DX: CPT | Mod: S$GLB,,, | Performed by: PHYSICIAN ASSISTANT

## 2024-06-25 PROCEDURE — 1101F PT FALLS ASSESS-DOCD LE1/YR: CPT | Mod: CPTII,S$GLB,, | Performed by: PHYSICIAN ASSISTANT

## 2024-06-25 PROCEDURE — 99214 OFFICE O/P EST MOD 30 MIN: CPT | Mod: 25,S$GLB,, | Performed by: PHYSICIAN ASSISTANT

## 2024-06-25 PROCEDURE — 1159F MED LIST DOCD IN RCRD: CPT | Mod: CPTII,S$GLB,, | Performed by: PHYSICIAN ASSISTANT

## 2024-06-25 PROCEDURE — 3008F BODY MASS INDEX DOCD: CPT | Mod: CPTII,S$GLB,, | Performed by: PHYSICIAN ASSISTANT

## 2024-06-25 PROCEDURE — 1157F ADVNC CARE PLAN IN RCRD: CPT | Mod: CPTII,S$GLB,, | Performed by: PHYSICIAN ASSISTANT

## 2024-06-25 PROCEDURE — 3288F FALL RISK ASSESSMENT DOCD: CPT | Mod: CPTII,S$GLB,, | Performed by: PHYSICIAN ASSISTANT

## 2024-06-25 PROCEDURE — 1126F AMNT PAIN NOTED NONE PRSNT: CPT | Mod: CPTII,S$GLB,, | Performed by: PHYSICIAN ASSISTANT

## 2024-06-25 PROCEDURE — 3078F DIAST BP <80 MM HG: CPT | Mod: CPTII,S$GLB,, | Performed by: PHYSICIAN ASSISTANT

## 2024-06-25 PROCEDURE — 99999 PR PBB SHADOW E&M-EST. PATIENT-LVL IV: CPT | Mod: PBBFAC,,, | Performed by: PHYSICIAN ASSISTANT

## 2024-06-25 NOTE — PROGRESS NOTES
"Subjective:     HPI: Raffy Rutherford Jr. is a 74 y.o. male who was referred to me by NP Cheyenne Li in consultation for  nasal vestibulitis .    Patient reports a sound of "crunching/crepitus" in his nose ONLY when lying down AND with ear plugs.  Patient denies any nasal obstruction, facial pressure, hyposmia or noisy breathing.  Symptoms have been present for at least 6 months.  He was recently evaluated by Cheyenne Waterman for these symptoms and diagnosed with nasal vestibulitis.  Patient was prescribed Bactroban ointment and he used it prn.     Current sinonasal medications include none at present.    He does not recall previously having allergy testing.  He relates a diagnosis of obstructive sleep apnea without regular CPAP use.   He does not recall a prior history of nasal trauma.  He has previously had sinonasal surgery consisting of 2 septoplasties.    He has not had a tonsillectomy.    Past Medical/Past Surgical History  Past Medical History:   Diagnosis Date    Allergy     Arthritis     Back pain     after trauma beginning in 89    Cataract     Chronic pain     neck and left shoulder    Cluster headache 2013    Colon polyps 2007 2007-2019: TA x5, HP x3    Degenerative disc disease     Depression     Diverticulitis 12/2013    Dry eye syndrome     Fibromyalgia 2013    GERD (gastroesophageal reflux disease)     Hepatitis 1970's    A    History of prostate biopsy 2002    Hyperlipidemia     Joint pain     Prostate cancer 07/2021    PVD (posterior vitreous detachment)     Salzmann's nodular dystrophy of left eye     Sleep apnea     Thyroid nodule 07/16/2014    Trigeminal neuralgia of left side of face     Tubular adenoma of colon 2007    5 removed [id number]    Visual disturbance 2012    problems after cataract surgery     He has a past surgical history that includes Knee surgery; Hemorrhoid surgery; Cholecystectomy; Sinus surgery; Eye surgery; Back surgery; Joint replacement; Total hip arthroplasty (04/2012); " Cosmetic surgery (02/10/2015); Cosmetic surgery (02/10/2015); Cataract extraction w/  intraocular lens implant (Bilateral); Spinal fusion (06/22/2015); Injection of steroid (Right, 12/06/2018); Injection of steroid (Right, 09/19/2019); Esophagogastroduodenoscopy (N/A, 12/17/2019); Colonoscopy (N/A, 12/17/2019); Cystoscopy with ureteroscopy, retrograde pyelography, and insertion of stent (Left, 08/19/2020); Fusion of lumbar spine by anterior approach (N/A, 08/20/2020); Ulnar nerve transposition (Left, 12/16/2020); Carpal tunnel release (Right, 05/18/2021); Colonoscopy (2007); Repair of extensor tendon (Left, 04/07/2022); Irrigation and debridement of upper extremity (Left, 04/07/2022); Arthroscopic debridement of shoulder (Left, 04/19/2024); Arthroscopic repair of rotator cuff of shoulder (Left, 04/19/2024); arthroscopy,shoulder,with biceps tenodesis (Left, 04/19/2024); Arthroscopy of shoulder with decompression of subacromial space (Left, 04/19/2024); and Shoulder surgery.    Family History/Social History  His family history includes Arthritis in his sister; Cancer in his maternal cousin; Colon cancer in his mother; Crohn's disease in his sister; Diabetes in his brother; Heart attack in his maternal grandfather; Irritable bowel syndrome in his son; Jaundice in his grandchild; Macular degeneration in his brother; Migraines in his sister; Neurological Disorders in his son; Other in his brother, maternal uncle, and maternal uncle; Other (age of onset: 44) in his father; Pancreatitis in his brother; Stroke (age of onset: 86) in his mother; Tremor in his daughter and son; Uterine cancer (age of onset: 77) in his mother.  He reports that he has never smoked. He has never used smokeless tobacco. He reports current alcohol use of about 5.0 standard drinks of alcohol per week. He reports that he does not use drugs.    Allergies/Immunizations  He is allergic to alphagan [brimonidine], coumadin [warfarin], and  oxycodone.  Immunization History   Administered Date(s) Administered    COVID-19, MRNA, LN-S, PF (Pfizer) (Gray Cap) 05/24/2022    COVID-19, MRNA, LN-S, PF (Pfizer) (Purple Cap) 01/16/2021, 02/06/2021, 09/25/2021    COVID-19, mRNA, LNP-S, PF (Moderna 2023)Ages 12+ 10/11/2023    COVID-19, mRNA, LNP-S, bivalent booster, PF (PFIZER OMICRON) 05/08/2023    Influenza 10/09/2007, 10/15/2008, 12/03/2009, 11/08/2010, 10/03/2012, 11/03/2014, 09/12/2020    Influenza (FLUAD) - Quadrivalent - Adjuvanted - PF *Preferred* (65+) 09/12/2020, 09/22/2021, 10/11/2022, 10/11/2023    Influenza - High Dose - PF (65 years and older) 10/19/2017, 10/15/2018, 10/17/2019    Influenza - Quadrivalent - PF *Preferred* (6 months and older) 11/02/2015, 11/01/2016    Influenza Split 10/03/2012    Pneumococcal Conjugate - 13 Valent 10/15/2018    Pneumococcal Polysaccharide - 23 Valent 03/02/2020    RSVpreF (Arexvy) 11/17/2023    Tdap 03/09/2016    Zoster 10/03/2012    Zoster Recombinant 09/22/2021        Medications   AJOVY SYRINGE Syrg  atorvastatin  butalbital-acetaminophen-caffeine -40 mg  celecoxib  clindamycin  clonazePAM  doxycycline  ergocalciferol Cap  HYDROcodone-acetaminophen  hydrocortisone-pramoxine Crea  lamoTRIgine  methocarbamoL Tab  onabotulinumtoxina  perfluorohexyloctane (PF) Drop  prednisoLONE acetate Drps  pregabalin  RABEprazole  selegiline  traZODone  ubrogepant  VEVYE Drop  XIIDRA Dpet     Review of Systems   HENT: Negative for facial swelling.          Objective:     /78 (BP Location: Right arm, Patient Position: Sitting)   Pulse (!) 57   Wt 82.8 kg (182 lb 8.7 oz)   BMI 27.76 kg/m²      Physical Exam  Vitals reviewed.   Constitutional:       Appearance: Normal appearance.   HENT:      Head: Normocephalic and atraumatic.      Right Ear: External ear normal.      Left Ear: External ear normal.      Nose:      Right Turbinates: Not enlarged or swollen.      Left Turbinates: Not enlarged or swollen.   Eyes:       Conjunctiva/sclera: Conjunctivae normal.   Pulmonary:      Effort: Pulmonary effort is normal.   Neurological:      Mental Status: He is alert.          Procedure    Nasal endoscopy performed.  See procedure note.    Data Reviewed  I personally reviewed the chart, including any outside records, and pertinent data below:    I reviewed the following notes Internal Medicine and ENT     WBC (K/uL)   Date Value   04/05/2024 7.85     Eosinophil % (%)   Date Value   04/05/2024 1.1     Eos # (K/uL)   Date Value   04/05/2024 0.1     Platelets (K/uL)   Date Value   04/05/2024 290     Glucose (mg/dL)   Date Value   04/05/2024 118 (H)     Total IgE (IU/mL)   Date Value   02/02/2018 <35     No sinus imaging available.    Assessment & Plan:     1. Rhinitis sicca  2. Nasal vestibulitis   - unclear etiology for patient's audible breathing/crepitus   - nasal endoscopy without septal perforation, polyps, masses, or purulence.  Dried mucus on L MT (removed)   - Intranasal hydration encouraged    He will follow up as needed  I had a discussion with the patient regarding his condition and the further workup and management options.    All questions were answered, and the patient is in agreement with the above.     Disclaimer:  This note may have been prepared utilizing voice recognition software which may result in occasional typographical errors in the text such as sound alike words.   If further clarification is needed, please contact the ENT department of [facility].

## 2024-06-25 NOTE — PROCEDURES
"Nasal/sinus endoscopy    Date/Time: 6/25/2024 11:30 AM    Time out: Immediately prior to procedure a "time out" was called to verify the correct patient, procedure, equipment, support staff and site/side marked as required.    Performed by: Bean Espinosa PA-C  Authorized by: Bean Espinosa PA-C    Consent Done?:  Yes (Verbal)  Anesthesia:     Local anesthetic:  Lidocaine 4% and Yan-Synephrine 1/2%    Location: Bilateral.    Endoscope type: Rigid.    Patient tolerance:  Patient tolerated the procedure well with no immediate complications  Nose:     Procedure Performed:  Nasal Endoscopy  External:      No external nasal deformity  Intranasal:      Mucosa no polyps     Mucosa ulcers not present     No mucosa lesions present     Turbinates not enlarged     No septum gross deformity  Nasopharynx:      No mucosa lesions     Posterior choanae patent     Eustachian tube patent     Surgical changes to R max/ethmoid  Dried mucus L MT    "

## 2024-07-02 ENCOUNTER — TELEPHONE (OUTPATIENT)
Dept: SPORTS MEDICINE | Facility: CLINIC | Age: 74
End: 2024-07-02
Payer: COMMERCIAL

## 2024-07-02 NOTE — TELEPHONE ENCOUNTER
Attempted to contact pt. Left voicemail. Informed pt that Dr. Up will not longer be seeing pts at scheduled time on 07/22/24. Appt has been moved from 1300 to 1330. Asked pt to return call to clinic at [phone] c questions. MyChart sent.     ==  Spoke c pt's wife, Valarie. Informed her of above. She stated new appt will be fine. Pt will call c additional questions/concerns in interim. Pt expressed understanding & was thankful.

## 2024-07-03 RX ORDER — CYCLOSPORINE OPHTHALMIC SOLUTION 1 MG/ML
1 SOLUTION/ DROPS OPHTHALMIC 2 TIMES DAILY
Qty: 2 ML | Refills: 11 | Status: SHIPPED | OUTPATIENT
Start: 2024-07-03

## 2024-07-10 ENCOUNTER — OFFICE VISIT (OUTPATIENT)
Dept: ORTHOPEDICS | Facility: CLINIC | Age: 74
End: 2024-07-10
Payer: COMMERCIAL

## 2024-07-10 DIAGNOSIS — M65.4 DE QUERVAIN'S TENOSYNOVITIS, LEFT: Primary | ICD-10-CM

## 2024-07-10 PROCEDURE — 99441 PR PHYSICIAN TELEPHONE EVALUATION 5-10 MIN: CPT | Mod: 95,,, | Performed by: ORTHOPAEDIC SURGERY

## 2024-07-10 PROCEDURE — 1160F RVW MEDS BY RX/DR IN RCRD: CPT | Mod: CPTII,95,, | Performed by: ORTHOPAEDIC SURGERY

## 2024-07-10 PROCEDURE — 1157F ADVNC CARE PLAN IN RCRD: CPT | Mod: CPTII,95,, | Performed by: ORTHOPAEDIC SURGERY

## 2024-07-10 PROCEDURE — 1159F MED LIST DOCD IN RCRD: CPT | Mod: CPTII,95,, | Performed by: ORTHOPAEDIC SURGERY

## 2024-07-10 NOTE — PROGRESS NOTES
Established Patient - Audio Only Telehealth Visit     The patient location is: [place]   The chief complaint leading to consultation is:   Encounter Diagnosis   Name Primary?    De Quervain's tenosynovitis, left Yes     Visit type: Virtual visit with audio only (telephone)  Total time spent with patient: 6 mins       The reason for the audio only service rather than synchronous audio and video virtual visit was related to technical difficulties or patient preference/necessity.     Each patient to whom I provide medical services by telemedicine is:  (1) informed of the relationship between the physician and patient and the respective role of any other health care provider with respect to management of the patient; and (2) notified that they may decline to receive medical services by telemedicine and may withdraw from such care at any time. Patient verbally consented to receive this service via voice-only telephone call.       HPI:  He reports significant improvement in the left wrist since his left wrist 1st dorsal compartment steroid injection on 06/17/2024.  He has also been wearing his brace in his therapy appointments.  He states after therapy he does have more of pain in the wrist this flares up slightly after therapy.  Overall he feels he is better.        Assessment and plan:  Continue with brace wear for 2 more weeks in therapy then wean brace.  Follow up as needed.                        This service was not originating from a related E/M service provided within the previous 7 days nor will  to an E/M service or procedure within the next 24 hours or my soonest available appointment.  Prevailing standard of care was able to be met in this audio-only visit.

## 2024-07-11 ENCOUNTER — PATIENT MESSAGE (OUTPATIENT)
Dept: INTERNAL MEDICINE | Facility: CLINIC | Age: 74
End: 2024-07-11
Payer: COMMERCIAL

## 2024-07-11 DIAGNOSIS — F33.1 MODERATE EPISODE OF RECURRENT MAJOR DEPRESSIVE DISORDER: Primary | ICD-10-CM

## 2024-07-11 RX ORDER — SELEGILINE 12 MG/24H
1 PATCH TRANSDERMAL DAILY
Qty: 30 PATCH | Refills: 11 | Status: SHIPPED | OUTPATIENT
Start: 2024-07-11 | End: 2025-07-11

## 2024-07-11 NOTE — TELEPHONE ENCOUNTER
Pt requesting increase in Emsam back to 12 mg, states it's time for the increase and that he rotates between 9 mg and 12 mg

## 2024-07-14 RX ORDER — METHYLPREDNISOLONE ACETATE 40 MG/ML
40 INJECTION, SUSPENSION INTRA-ARTICULAR; INTRALESIONAL; INTRAMUSCULAR; SOFT TISSUE
Status: DISCONTINUED | OUTPATIENT
Start: 2024-06-17 | End: 2024-07-14 | Stop reason: HOSPADM

## 2024-07-14 NOTE — PROCEDURES
Tendon Sheath    Date/Time: 6/17/2024 1:45 PM    Performed by: Kaye Solis MD  Authorized by: Kaye Solis MD    Consent Done?:  Yes (Verbal)  Indications:  Pain  Prep: patient was prepped and draped in usual sterile fashion      Local anesthesia used?: Yes    Anesthesia:  Local infiltration  Local anesthetic:  Lidocaine 1% without epinephrine  Anesthetic total (ml):  1    Location:  Wrist  Site:  L first doral compartment  Needle size:  25 G  Approach:  Radial  Medications:  40 mg methylPREDNISolone acetate 40 mg/mL  Patient tolerance:  Patient tolerated the procedure well with no immediate complications

## 2024-07-15 ENCOUNTER — PATIENT MESSAGE (OUTPATIENT)
Dept: OTHER | Facility: OTHER | Age: 74
End: 2024-07-15
Payer: COMMERCIAL

## 2024-07-15 ENCOUNTER — PATIENT MESSAGE (OUTPATIENT)
Dept: ORTHOPEDICS | Facility: CLINIC | Age: 74
End: 2024-07-15
Payer: COMMERCIAL

## 2024-07-15 DIAGNOSIS — M65.4 DE QUERVAIN'S TENOSYNOVITIS, LEFT: Primary | ICD-10-CM

## 2024-07-16 ENCOUNTER — PATIENT OUTREACH (OUTPATIENT)
Dept: OTHER | Facility: OTHER | Age: 74
End: 2024-07-16
Payer: COMMERCIAL

## 2024-07-17 ENCOUNTER — CLINICAL SUPPORT (OUTPATIENT)
Dept: REHABILITATION | Facility: HOSPITAL | Age: 74
End: 2024-07-17
Attending: ORTHOPAEDIC SURGERY
Payer: COMMERCIAL

## 2024-07-17 DIAGNOSIS — M25.532 LEFT WRIST PAIN: Primary | ICD-10-CM

## 2024-07-17 PROCEDURE — 97165 OT EVAL LOW COMPLEX 30 MIN: CPT

## 2024-07-17 PROCEDURE — 97112 NEUROMUSCULAR REEDUCATION: CPT

## 2024-07-19 PROBLEM — M25.532 LEFT WRIST PAIN: Status: ACTIVE | Noted: 2024-07-19

## 2024-07-19 NOTE — PLAN OF CARE
[facility]  Occupational Therapy Initial Evaluation    Date: 7/17/2024  Name: Raffy Rutherford Jr.  Clinic Number: [id number]    Therapy Diagnosis:   Encounter Diagnosis   Name Primary?    Left wrist pain Yes     Physician: Kaye Solis MD    Physician Orders: eval and treat  Medical Diagnosis: DeQuervains  Onset: since shoulder surgery earlier this year  Evaluation Date: 07/17/2024  Insurance Authorization Period Expiration: 07/15/2025  Plan of Care Expiration: 10/11/2024  Date of Return to MD: PRN  Visit # / Visits authorized: 1 / 1  FOTO: 1/3    FOTO Lobby Code: [id number]     Precautions:  Standard, falls    Time In: 200pm  Time Out: 300pm  Total Appointment Time (timed & untimed codes): 60 minutes      SUBJECTIVE     Date of Onset: 4/19/2024    History of Current Condition/Mechanism of Injury: Raffy reports: he has been having pain in the wrist since undergoing shoulder surgery in April by Dr. Up. He attributes this to the sling and how his wrist was positioned in the sling. He has concerns over being able to play the cello    Falls: none recently    Involved Side: Left  Dominant Side: Right  Imaging:  see EMR  Prior Therapy: none for this injury. Currently in PT at [facility] for left shoulder  Occupation:  Licensed professional counselor and cello player  Working presently: self-employed  Duties: computer work    Functional Limitations/Social History:    Previous functional status includes: Independent with all ADLs.     Current Functional Status   Home/Living environment: lives with their spouse      Limitation of Functional Status as follows:   ADLs/IADLs:     - Feeding: using right hand    - Bathing: using right hand    - Dressing/Grooming: using right hand    - Driving: using right hand     Leisure: playing multiple instruments, the cello being the most played- unable to play    Pain:  Functional Pain Scale Rating 0-10: Current 0/10, worst 3/10, best 0/10   Location: left wrist  , along radial side  Description: Aching, Dull, and Shooting  Aggravating Factors: Extension and Flexing  Easing Factors: rest     Patient's Goals for Therapy: to return to playing cello    Medical History:   Past Medical History:   Diagnosis Date    Allergy     Arthritis     Back pain     after trauma beginning in 195    Cataract     Chronic pain     neck and left shoulder    Cluster headache 2013    Colon polyps 2007 2007-2019: TA x5, HP x3    Degenerative disc disease     Depression     Diverticulitis 12/2013    Dry eye syndrome     Fibromyalgia 2013    GERD (gastroesophageal reflux disease)     Hepatitis 1970's    A    History of prostate biopsy 2002    Hyperlipidemia     Joint pain     Prostate cancer 07/2021    PVD (posterior vitreous detachment)     Salzmann's nodular dystrophy of left eye     Sleep apnea     Thyroid nodule 07/16/2014    Trigeminal neuralgia of left side of face     Tubular adenoma of colon 2007    5 removed [id number]    Visual disturbance 2012    problems after cataract surgery       Surgical History:    has a past surgical history that includes Knee surgery; Hemorrhoid surgery; Cholecystectomy; Sinus surgery; Eye surgery; Back surgery; Joint replacement; Total hip arthroplasty (04/2012); Cosmetic surgery (02/10/2015); Cosmetic surgery (02/10/2015); Cataract extraction w/  intraocular lens implant (Bilateral); Spinal fusion (06/22/2015); Injection of steroid (Right, 12/06/2018); Injection of steroid (Right, 09/19/2019); Esophagogastroduodenoscopy (N/A, 12/17/2019); Colonoscopy (N/A, 12/17/2019); Cystoscopy with ureteroscopy, retrograde pyelography, and insertion of stent (Left, 08/19/2020); Fusion of lumbar spine by anterior approach (N/A, 08/20/2020); Ulnar nerve transposition (Left, 12/16/2020); Carpal tunnel release (Right, 05/18/2021); Colonoscopy (2007); Repair of extensor tendon (Left, 04/07/2022); Irrigation and debridement of upper extremity (Left, 04/07/2022); Arthroscopic  debridement of shoulder (Left, 04/19/2024); Arthroscopic repair of rotator cuff of shoulder (Left, 04/19/2024); arthroscopy,shoulder,with biceps tenodesis (Left, 04/19/2024); Arthroscopy of shoulder with decompression of subacromial space (Left, 04/19/2024); and Shoulder surgery.    Medications:   has a current medication list which includes the following prescription(s): atorvastatin, butalbital-acetaminophen-caffeine -40 mg, celecoxib, clindamycin, clonazepam, vevye, doxycycline, ergocalciferol, ajovy syringe, hydrocodone-acetaminophen, hydrocortisone-pramoxine, lamotrigine, methocarbamol, perfluorohexyloctane (pf), prednisolone acetate, pregabalin, pregabalin, pregabalin, pregabalin, rabeprazole, emsam, trazodone, ubrogepant, and xiidra, and the following Facility-Administered Medications: onabotulinumtoxina.    Allergies:   Review of patient's allergies indicates:   Allergen Reactions    Alphagan [brimonidine]      Patient taking MASO-B Selective Inhibitor Selegiline (Emsam)    Coumadin [warfarin]      itch    Oxycodone      hiccups          OBJECTIVE       Elbow and Wrist ROM. Measured in degrees. WNL    WNL:     Strength (Dynamometer) and Pinch Strength (Pinch Gauge)  Measured in pounds.   7/19/2024 7/19/2024    right left   Rung II 75 65   Key Pinch 13 10   3pt Pinch 12 12     Sensation:   Some numbness along radial and ulnar nerves in hand      Manual Muscle Test   7/19/2024 7/19/2024    Right Left   Wrist Extension  5/5 5/5   Wrist Flexion 5/5 5/5             Limitation/Restriction for FOTO hand Survey    Therapist reviewed FOTO scores for Raffy Rutherford Jr. on 7/17/2024.   FOTO documents entered into [employer] - see Media section.    Limitation Score: 43%  FOTO score not truly reflective of hand due to being 12 weeks post op from shoulder surgery  on the same side         Treatment   Total Treatment time (time-based codes) separate from Evaluation: 30 minutes    Raffy received the treatments listed  below:     Neuromuscular re-education activities to improve Sense and Proprioception for 30 minutes. The following activities were included:  -cupping and KT for increase in proprioceptive sense and myofascial movement in order to decrease pain    Addressed shoulder scars as well as distal radial and ulnar nerves      Patient Education and Home Exercises      Education provided:   - KT wear care and precautions    Written Home Exercises Provided: none.      Pt was advised to perform these exercises free of pain, and to stop performing them if pain occurs.    Patient/Family Education: role of OT, goals for OT, scheduling/cancellations - pt verbalized understanding. Discussed insurance limitations with patient.    ASSESSMENT     Raffy Rutherford Jr. is a 74 y.o. male referred to outpatient occupational therapy and presents with a medical diagnosis of left wrist pain.  Patient presents with the following therapy deficits: Decreased  strength, Decreased functional hand use, Increased pain, Diminished/Impaired Sensation, and Diminished/Impaired Coordination and demonstrates limitations as described in the chart below. Following medical record review it is determined that pt will benefit from occupational therapy services in order to maximize pain free and/or functional use of left hand. The following goals were discussed with the patient and patient is in agreement with them as to be addressed in the treatment plan. The patient's rehab potential is Good.     Anticipated barriers to occupational therapy: hx of arthritis   Pt has no cultural, educational or language barriers to learning provided.  Medical Necessity is demonstrated by the following  Occupational Profile/History  Co-morbidities and personal factors that may impact the plan of care [x] LOW: Brief chart review  [] MODERATE: Expanded chart review   [] HIGH: Extensive chart review    Moderate / High Support Documentation:      Examination  Performance deficits  relating to physical, cognitive or psychosocial skills that result in activity limitations and/or participation restrictions  [x] LOW: addressing 1-3 Performance deficits  [] MODERATE: 3-5 Performance deficits  [] HIGH: 5+ Performance deficits (please support below)    Moderate / High Support Documentation:    Physical:  Muscle Power/Strength  Skin Integrity/Scar Formation   Strength  Proprioception Functions  Tactile Functions    Cognitive:  No Deficits    Psychosocial:    No Deficits     Treatment Options [x] LOW: Limited options  [] MODERATE: Several options  [] HIGH: Multiple options      Decision Making/ Complexity Score: low           Goals:   The following goals were discussed with the patient and patient is in agreement with them as to be addressed in the treatment plan.   Long Term Goals (LTGs); to be met by discharge.  Pt. Will return to playing cello with 1/10 pain  Pt. Will increase  strength by 5#       PLAN   Plan of Care Certification: 7/17/2024 to 10/11/2024.     Outpatient Occupational Therapy 2 times weekly for 12 weeks to include the following interventions: Paraffin, Fluidotherapy, Manual therapy/joint mobilizations, Modalities for pain management, US 3 mhz, Therapeutic exercises/activities., Iontophoresis with 2.0 cc Dexamethasone, Strengthening, Orthotic Fabrication/Fit/Training, and Joint Protection.      Jody Arnold, OTR/L,CHT      I CERTIFY THE NEED FOR THESE SERVICES FURNISHED UNDER THIS PLAN OF TREATMENT AND WHILE UNDER MY CARE  Physician's comments:      Physician's Signature: ___________________________________________________

## 2024-07-21 NOTE — PROGRESS NOTES
CC: Left shoulder follow-up    DATE OF PROCEDURE: 4/19/2024   OPERATION:   Left  1. Shoulder arthroscopic rotator cuff repair (CPT 61610)  2. Shoulder arthroscopic biceps tenodesis (CPT 10233)  3. Shoulder arthroscopic extensive debridement (CPT 96928)    4. Shoulder arthroscopic subacromial decompression      Raffy Rutherford Jr. presents today for follow up appointment of his left shoulder. Patient is now 3 months 3 days status post above procedure. Continues PT at [facility].  Doing well.  He does have some intermittent pain still but making progress.  Pleased with his recovery and wants to begin playing the cello again.    Prior Hx 5/30/2024:   Raffy Rutherford Jr. reports to be doing well 6 weeks s/p the above mentioned procedure.  Physical therapy at [facility].  Doing well overall.  He does have some intermittent pain in the shoulder.  He does report he had a fall about 2 weeks out from surgery.  No reported injury necessarily to the left shoulder.  It sounds like the postop pain is getting better.    PAST MEDICAL HISTORY:   Past Medical History:   Diagnosis Date    Allergy     Arthritis     Back pain     after trauma beginning in 195    Cataract     Chronic pain     neck and left shoulder    Cluster headache 2013    Colon polyps 2007 2007-2019: TA x5, HP x3    Degenerative disc disease     Depression     Diverticulitis 12/2013    Dry eye syndrome     Fibromyalgia 2013    GERD (gastroesophageal reflux disease)     Hepatitis 1970's    A    History of prostate biopsy 2002    Hyperlipidemia     Joint pain     Prostate cancer 07/2021    PVD (posterior vitreous detachment)     Salzmann's nodular dystrophy of left eye     Sleep apnea     Thyroid nodule 07/16/2014    Trigeminal neuralgia of left side of face     Tubular adenoma of colon 2007    5 removed [id number]    Visual disturbance 2012    problems after cataract surgery     PAST SURGICAL HISTORY:  Past Surgical History:   Procedure Laterality Date    ARTHROSCOPIC  DEBRIDEMENT OF SHOULDER Left 04/19/2024    Procedure: DEBRIDEMENT, SHOULDER, ARTHROSCOPIC;  Surgeon: GEORGIANA Up MD;  Location: [facility] OR;  Service: Orthopedics;  Laterality: Left;  Regional w/o Catheter, Interscalene, 0.5% Marcaine Plain    ARTHROSCOPIC REPAIR OF ROTATOR CUFF OF SHOULDER Left 04/19/2024    Procedure: REPAIR, ROTATOR CUFF, ARTHROSCOPIC;  Surgeon: GEORGIANA Up MD;  Location: [facility] OR;  Service: Orthopedics;  Laterality: Left;    ARTHROSCOPY OF SHOULDER WITH DECOMPRESSION OF SUBACROMIAL SPACE Left 04/19/2024    Procedure: ARTHROSCOPY, SHOULDER, WITH SUBACROMIAL SPACE DECOMPRESSION;  Surgeon: GEORGIANA Up MD;  Location: [facility] OR;  Service: Orthopedics;  Laterality: Left;    ARTHROSCOPY,SHOULDER,WITH BICEPS TENODESIS Left 04/19/2024    Procedure: ARTHROSCOPY,SHOULDER,WITH BICEPS TENODESIS;  Surgeon: GEORGIANA Up MD;  Location: [facility] OR;  Service: Orthopedics;  Laterality: Left;    BACK SURGERY      CARPAL TUNNEL RELEASE Right 05/18/2021    Procedure: RELEASE, CARPAL TUNNEL;  Surgeon: Kaye Solis MD;  Location: [facility] OR;  Service: Orthopedics;  Laterality: Right;    CATARACT EXTRACTION W/  INTRAOCULAR LENS IMPLANT Bilateral     CHOLECYSTECTOMY      COLONOSCOPY N/A 12/17/2019    Normal - Repeat 5yrs    COLONOSCOPY  2007 2007 TA x2, 2011 TA x3, 2014 HP x3, 2019 normal    COSMETIC SURGERY  02/10/2015    Direct mid-forehead brow lift    COSMETIC SURGERY  02/10/2015    Bilateral upper lid blepahroplasty    CYSTOSCOPY WITH URETEROSCOPY, RETROGRADE PYELOGRAPHY, AND INSERTION OF STENT Left 08/19/2020    Procedure: CYSTOSCOPY, WITH RETROGRADE PYELOGRAM AND URETERAL STENT INSERTION;  Surgeon: Katelynn George MD;  Location: [facility] OR;  Service: Urology;  Laterality: Left;    ESOPHAGOGASTRODUODENOSCOPY N/A 12/17/2019    Procedure: ESOPHAGOGASTRODUODENOSCOPY (EGD);  Surgeon: Amadou Hardin MD;  Location: [facility] ([address] [place] [address]);  Service: Endoscopy;  Laterality: N/A;    EYE SURGERY      FUSION OF  LUMBAR SPINE BY ANTERIOR APPROACH N/A 08/20/2020    Procedure: FUSION, SPINE, LUMBAR, ANTERIOR APPROACH L5-S1 ALIF Stand Alone;  Surgeon: Jason Caldwell MD;  Location: [facility];  Service: Neurosurgery;  Laterality: N/A;    HEMORRHOID SURGERY      with complication of chronic bleeding for 6 weeks     INJECTION OF STEROID Right 12/06/2018    Procedure: INJECTION, STEROID Right SI Joint Block and Steroid Injection;  Surgeon: Jason Caldwell MD;  Location: [facility] OR;  Service: Neurosurgery;  Laterality: Right;  Procedure: Right SI Joint Block and Steroid Injection  Surgery Time: 30 MIN  LOS: 0  Anesthesia: MAC  Radiology: C-arm  Bed: [place] 4 Poster  Position: Prone    INJECTION OF STEROID Right 09/19/2019    Procedure: INJECTION, STEROID Procedure: Right SI joint block nd steroid injection;  Surgeon: Jason Caldwell MD;  Location: [facility];  Service: Neurosurgery;  Laterality: Right;  Procedure: Right SI joint block nd steroid injection  Surgery Time: 30 mins  LOS:   Anesthesia: General MAC  Radiology:C-arm  Bed: Regular Bed  Position: Prone    IRRIGATION AND DEBRIDEMENT OF UPPER EXTREMITY Left 04/07/2022    Procedure: IRRIGATION AND DEBRIDEMENT, UPPER EXTREMITY;  Surgeon: Kaye Solis MD;  Location: [facility];  Service: Orthopedics;  Laterality: Left;    JOINT REPLACEMENT      KNEE SURGERY      involving arthroscopic surgery to both knees    REPAIR OF EXTENSOR TENDON Left 04/07/2022    Procedure: REPAIR, TENDON, EXTENSOR thumb, EPB and EPL;  Surgeon: Kaye Solis MD;  Location: [facility];  Service: Orthopedics;  Laterality: Left;    SHOULDER SURGERY      SINUS SURGERY      left molar and sinus surgery for trigeminal neuralgia    SPINAL FUSION  06/22/2015    L3-L5 XLIF/TANA    TOTAL HIP ARTHROPLASTY  04/2012    Pt states he had total hip replacement on his left hip.    ULNAR NERVE TRANSPOSITION Left 12/16/2020    Procedure: TRANSPOSITION, NERVE, ULNAR - left carpal and cubital tunnel releases;  Surgeon: Addi LUCIANO  MD Juancarlos;  Location[facility] OR;  Service: Orthopedics;  Laterality: Left;     FAMILY HISTORY:  Family History   Problem Relation Name Age of Onset    Stroke Mother Honey 86    Colon cancer Mother Honey         early/mid-60s    Uterine cancer Mother Honey 77    Pancreatitis Brother Will         acute, now s/p nathalia    Diabetes Brother Will     Macular degeneration Brother Will     Other Brother Will         foot drop    Other Father Raffy,Sr. 44        pituitary tumor- CA vs benign?    Heart attack Maternal Grandfather mgf         suspected, 50s    Migraines Sister Ida     Crohn's disease Sister Ida         w/ assoc joint issues    Arthritis Sister Ida     Tremor Daughter Rocio     Irritable bowel syndrome Son Rocky         leaning toward Crohn's dx    Neurological Disorders Son Rocky         nonspecific, benign fasciculations of calves    Tremor Son Rocky     Jaundice Grandchild Jocelyne     Other Maternal Uncle Jesse         memory issue    Other Maternal Uncle Real         memory issue    Cancer Maternal Cousin Ira         origin? maybe thyroid? 40s?    Melanoma Neg Hx      Amblyopia Neg Hx      Blindness Neg Hx      Cataracts Neg Hx      Glaucoma Neg Hx      Hypertension Neg Hx      Retinal detachment Neg Hx      Strabismus Neg Hx      Thyroid disease Neg Hx      Allergic rhinitis Neg Hx      Allergies Neg Hx      Angioedema Neg Hx      Asthma Neg Hx      Eczema Neg Hx      Urticaria Neg Hx      Rhinitis Neg Hx      Immunodeficiency Neg Hx      Atopy Neg Hx       MEDICATIONS:    Current Outpatient Medications:     atorvastatin (LIPITOR) 40 MG tablet, Take 1 tablet (40 mg total) by mouth once daily., Disp: 90 tablet, Rfl: 1    butalbital-acetaminophen-caffeine -40 mg (FIORICET, ESGIC) -40 mg per tablet, Take 1 tablet by mouth daily as needed for 30 days., Disp: 15 tablet, Rfl: 0    celecoxib (CELEBREX) 100 MG capsule, Take 2 capsules (200 mg total) by mouth 2 (two) times daily., Disp: 180  capsule, Rfl: 3    clindamycin (CLEOCIN) 150 MG capsule, Take 4 capsules (600 mg total) by mouth 1 hour prior to dental appointment., Disp: 12 capsule, Rfl: 1    clonazePAM (KLONOPIN) 0.5 MG tablet, Take 1 tablet by mouth twice a day as needed for anxiety, Disp: 60 tablet, Rfl: 5    cycloSPORINE (VEVYE) 0.1 % Drop, Place 1 drop into both eyes 2 (two) times a day., Disp: 2 mL, Rfl: 11    doxycycline 50 MG capsule, Take 1 capsule (50 mg total) by mouth 2 (two) times daily., Disp: 60 capsule, Rfl: 1    ergocalciferol (VITAMIN D2) 50,000 unit Cap, Take 1 capsule (50,000 Units total) by mouth every 7 days., Disp: 12 capsule, Rfl: 3    fremanezumab-vfrm (AJOVY SYRINGE) 225 mg/1.5 mL injection, Inject 1.5 mLs (225 mg total) into the skin every 28 days., Disp: 1.5 mL, Rfl: 5    HYDROcodone-acetaminophen (NORCO) 5-325 mg per tablet, Take 1 tablet by mouth every 8 (eight) hours as needed for Pain., Disp: 90 tablet, Rfl: 0    hydrocortisone-pramoxine (ANALPRAM-HC) 2.5-1 % Crea, Place rectally 3 (three) times daily., Disp: 30 g, Rfl: 2    lamoTRIgine (LAMICTAL) 200 MG tablet, Take 1 tablet (200 mg total) by mouth once daily., Disp: 90 tablet, Rfl: 3    methocarbamoL (ROBAXIN) 750 MG Tab, Take 1 tablet (750 mg total) by mouth 3 (three) times daily., Disp: 60 tablet, Rfl: 2    perfluorohexyloctane, PF, 100 % Drop, Place 1 drop into both eyes 4 (four) times daily., Disp: 3 mL, Rfl: 11    prednisoLONE acetate (PRED FORTE) 1 % DrpS, Place 1 drop into both eyes 4 (four) times daily, THEN 1 drop 3 (three) times daily, THEN 1 drop 2 (two) times a day, THEN 1 drop once daily., Disp: 5 mL, Rfl: 0    pregabalin (LYRICA) 25 MG capsule, Take 2 capsules (50 mg total) by mouth once daily., Disp: 90 capsule, Rfl: 1    pregabalin (LYRICA) 25 MG capsule, Take 1 capsule (25 mg total) by mouth 2 (two) times a day., Disp: 180 capsule, Rfl: 0    pregabalin (LYRICA) 75 MG capsule, Take 3 capsules by mouth at night (Patient taking differently: Take 2  capsules by mouth at night), Disp: 90 capsule, Rfl: 1    pregabalin (LYRICA) 75 MG capsule, Take 3 capsules (225 mg total) by mouth every evening., Disp: 270 capsule, Rfl: 0    RABEprazole (ACIPHEX) 20 mg tablet, Take 1 tablet (20 mg total) by mouth 2 (two) times daily., Disp: 180 tablet, Rfl: 3    selegiline (EMSAM) 12 mg/24 hr, Place 1 patch onto the skin once daily., Disp: 30 patch, Rfl: 11    traZODone (DESYREL) 100 MG tablet, Take 1 tablet (100 mg total) by mouth every evening., Disp: 90 tablet, Rfl: 6    ubrogepant (UBRELVY) 100 mg tablet, Take 1 tablet (100 mg total) by mouth every 2 (two) hours as needed for Migraine. Max 200 mg/day., Disp: 16 tablet, Rfl: 5    XIIDRA 5 % Dpet, Apply 1 drop to eye 2 (two) times a day., Disp: 60 each, Rfl: 11    Current Facility-Administered Medications:     onabotulinumtoxina injection 200 Units, 200 Units, Intramuscular, q12 weeks, Macie Pearson, FNJOCE, 200 Units at 05/21/24 1451    ALLERGIES:  Review of patient's allergies indicates:   Allergen Reactions    Alphagan [brimonidine]      Patient taking MASO-B Selective Inhibitor Selegiline (Emsam)    Coumadin [warfarin]      itch    Oxycodone      hiccups     REVIEW OF SYSTEMS:  Constitution: Negative. Negative for chills, fever and night sweats.    Hematologic/Lymphatic: Negative for bleeding problem. Does not bruise/bleed easily.   Skin: Negative for dry skin, itching and rash.   Musculoskeletal: Negative for falls. Positive for intermittent left shoulder pain and muscle weakness.     All other review of symptoms were reviewed and found to be noncontributory.    PHYSICAL EXAMINATION:  Vitals:  /76   Pulse 60   Wt 83.5 kg (184 lb 1.4 oz)   BMI 27.99 kg/m²    General: Well-developed well-nourished 74 y.o. malein no acute distress   Cardiovascular: Regular rhythm by palpation of distal pulse, normal color and temperature, no concerning varicosities on symptomatic side   Lungs: No labored breathing or wheezing  appreciated   Neuro: Alert and oriented ×3   Psychiatric: well oriented to person, place and time, demonstrates normal mood and affect   Skin: No rashes, lesions or ulcers, normal temperature, turgor, and texture on uninvolved extremity    Ortho/SPM Exam  Examination of the left shoulder demonstrates well-healed incisions.  No biceps deformity.  Intact overhead active range of motion with near symmetry to the other side.  Internal rotation to T8.  Good scapular mechanics.  No winging.  4+ out of 5 resisted scaption.  No pain or significant weakness.  Motor and sensory intact to the left hand.    IMAGING:  None    ASSESSMENT:      ICD-10-CM ICD-9-CM   1. Shoulder weakness  R29.898 719.61     PLAN:     Findings discussed with the patient.  Making progress.  May begin the resisted/strengthening phase of recovery.  Discussed activities to avoid otherwise around the house.  He may begin to play the cello.  5 lb lifting limit for now.  I will see him back in 2 months for recheck.  I expect him to continue to get better with time.  All questions answered.    Procedures

## 2024-07-22 ENCOUNTER — OFFICE VISIT (OUTPATIENT)
Dept: SPORTS MEDICINE | Facility: CLINIC | Age: 74
End: 2024-07-22
Payer: COMMERCIAL

## 2024-07-22 VITALS
BODY MASS INDEX: 27.99 KG/M2 | DIASTOLIC BLOOD PRESSURE: 76 MMHG | HEART RATE: 60 BPM | SYSTOLIC BLOOD PRESSURE: 130 MMHG | WEIGHT: 184.06 LBS

## 2024-07-22 DIAGNOSIS — R29.898 SHOULDER WEAKNESS: Primary | ICD-10-CM

## 2024-07-22 PROCEDURE — 1125F AMNT PAIN NOTED PAIN PRSNT: CPT | Mod: CPTII,S$GLB,, | Performed by: ORTHOPAEDIC SURGERY

## 2024-07-22 PROCEDURE — 3008F BODY MASS INDEX DOCD: CPT | Mod: CPTII,S$GLB,, | Performed by: ORTHOPAEDIC SURGERY

## 2024-07-22 PROCEDURE — 1101F PT FALLS ASSESS-DOCD LE1/YR: CPT | Mod: CPTII,S$GLB,, | Performed by: ORTHOPAEDIC SURGERY

## 2024-07-22 PROCEDURE — 1157F ADVNC CARE PLAN IN RCRD: CPT | Mod: CPTII,S$GLB,, | Performed by: ORTHOPAEDIC SURGERY

## 2024-07-22 PROCEDURE — 3288F FALL RISK ASSESSMENT DOCD: CPT | Mod: CPTII,S$GLB,, | Performed by: ORTHOPAEDIC SURGERY

## 2024-07-22 PROCEDURE — 99999 PR PBB SHADOW E&M-EST. PATIENT-LVL IV: CPT | Mod: PBBFAC,,, | Performed by: ORTHOPAEDIC SURGERY

## 2024-07-22 PROCEDURE — 99213 OFFICE O/P EST LOW 20 MIN: CPT | Mod: S$GLB,,, | Performed by: ORTHOPAEDIC SURGERY

## 2024-07-22 PROCEDURE — 3078F DIAST BP <80 MM HG: CPT | Mod: CPTII,S$GLB,, | Performed by: ORTHOPAEDIC SURGERY

## 2024-07-22 PROCEDURE — 1159F MED LIST DOCD IN RCRD: CPT | Mod: CPTII,S$GLB,, | Performed by: ORTHOPAEDIC SURGERY

## 2024-07-22 PROCEDURE — 3075F SYST BP GE 130 - 139MM HG: CPT | Mod: CPTII,S$GLB,, | Performed by: ORTHOPAEDIC SURGERY

## 2024-07-24 ENCOUNTER — CLINICAL SUPPORT (OUTPATIENT)
Dept: REHABILITATION | Facility: HOSPITAL | Age: 74
End: 2024-07-24
Payer: COMMERCIAL

## 2024-07-24 DIAGNOSIS — M25.532 LEFT WRIST PAIN: Primary | ICD-10-CM

## 2024-07-24 PROBLEM — G62.9 NEUROPATHY: Status: ACTIVE | Noted: 2021-04-22

## 2024-07-24 PROBLEM — M77.8 RIGHT WRIST TENDINITIS: Status: RESOLVED | Noted: 2017-07-28 | Resolved: 2024-07-24

## 2024-07-24 PROCEDURE — 97022 WHIRLPOOL THERAPY: CPT | Mod: KX

## 2024-07-24 PROCEDURE — 97112 NEUROMUSCULAR REEDUCATION: CPT | Mod: KX

## 2024-07-24 RX ORDER — ASPIRIN 81 MG/1
TABLET ORAL
COMMUNITY

## 2024-07-24 NOTE — PROGRESS NOTES
[facility]  Occupational Therapy Treatment Note     Date: 7/24/2024  Name: Raffy Rutherford Jr.  Clinic Number: [id number]    Therapy Diagnosis:   Encounter Diagnosis   Name Primary?    Left wrist pain Yes     Physician: Kaye Solis MD    Physician Orders: eval and treat  Medical Diagnosis: DeQuervains  Onset: since shoulder surgery earlier this year  Evaluation Date: 07/17/2024  Insurance Authorization Period Expiration: 07/15/2025  Plan of Care Expiration: 10/11/2024  Date of Return to MD: PRN  Visit # / Visits authorized: 1 / 1  FOTO: 1/3     FOTO Lobby Code: [id number]      Precautions:  Standard, falls     Time In: 200pm  Time Out: 300pm  Total Appointment Time (timed & untimed codes): 60 minutes          Subjective     Patient reports: the tape stayed on.   He was compliant with home exercise program given last session.   Response to previous treatment: first tx   Functional change: none noted to this date     Pain: 3-4/10  Location: left wrists      Objective     Objective Measures updated at progress report unless specified.    Elbow and Wrist ROM. Measured in degrees. WNL     WNL:      Strength (Dynamometer) and Pinch Strength (Pinch Gauge)  Measured in pounds.    7/19/2024 7/19/2024     right left   Rung II 75 65   Key Pinch 13 10   3pt Pinch 12 12      Sensation:   Some numbness along radial and ulnar nerves in hand        Manual Muscle Test    7/19/2024 7/19/2024     Right Left   Wrist Extension  5/5 5/5   Wrist Flexion 5/5 5/5          Limitation/Restriction for FOTO hand Survey     Therapist reviewed FOTO scores for Raffy Rutherford Jr. on 7/17/2024.   FOTO documents entered into [employer] - see Media section.     Limitation Score: 43%  FOTO score not truly reflective of hand due to being 12 weeks post op from shoulder surgery  on the same side            Treatment     Total Treatment time separate from Evaluation time:    Raffy received the treatments listed below:           Fluidotherapy: To L hand for 10 min, continuous air, 115 deg, air speed 50 to decrease pain, edema & scar tissue, sensory re- education, and increased tissue extensibility prior to therex    Neuro re-ed: 25 min  - serratus anterior tape   - space corrector distal tricep   - dequervains     therapeutic exercises to develop ROM for 10 minutes including:  Isospheres   Finklestein stretch       Patient Education and Home Exercises     Education provided:   - KT tape  - Progress towards goals     Written Home Exercises Provided: yes.  Exercises were reviewed and Raffy was able to demonstrate them prior to the end of the session.  Raffy demonstrated good  understanding of the home exercise program provided. See electronic medical record under Patient Instructions for exercises provided during therapy sessions.       Assessment      Irritation by pec. Advised to avoid KT tape     Raffy is progressing well towards his goals and there are no updates to goals at this time. Pt prognosis is Good.     Patient will continue to benefit from skilled outpatient occupational therapy to address the deficits listed in the problem list on initial evaluation provide patient/family education and to maximize patient's level of independence in the home and community environment.     Patient's spiritual, cultural and educational needs considered and patient agreeable to plan of care and goals.    Anticipated barriers to occupational therapy:     Goals:  Long Term Goals (LTGs); to be met by discharge.  Pt. Will return to playing cello with 1/10 pain  Pt. Will increase  strength by 5#        Plan     Updates/Grading for next session:     Vickie Perkins OT   7/24/2024

## 2024-07-25 ENCOUNTER — OFFICE VISIT (OUTPATIENT)
Dept: INTERNAL MEDICINE | Facility: CLINIC | Age: 74
End: 2024-07-25
Payer: COMMERCIAL

## 2024-07-25 ENCOUNTER — TELEPHONE (OUTPATIENT)
Dept: INTERNAL MEDICINE | Facility: CLINIC | Age: 74
End: 2024-07-25

## 2024-07-25 VITALS
RESPIRATION RATE: 18 BRPM | OXYGEN SATURATION: 96 % | SYSTOLIC BLOOD PRESSURE: 132 MMHG | HEIGHT: 68 IN | HEART RATE: 76 BPM | TEMPERATURE: 99 F | BODY MASS INDEX: 27.67 KG/M2 | WEIGHT: 182.56 LBS | DIASTOLIC BLOOD PRESSURE: 86 MMHG

## 2024-07-25 DIAGNOSIS — F33.1 MDD (MAJOR DEPRESSIVE DISORDER), RECURRENT EPISODE, MODERATE: ICD-10-CM

## 2024-07-25 DIAGNOSIS — M85.859 OSTEOPENIA OF NECK OF FEMUR, UNSPECIFIED LATERALITY: ICD-10-CM

## 2024-07-25 DIAGNOSIS — E78.49 OTHER HYPERLIPIDEMIA: ICD-10-CM

## 2024-07-25 DIAGNOSIS — Z23 NEED FOR SHINGLES VACCINE: ICD-10-CM

## 2024-07-25 DIAGNOSIS — I70.0 AORTIC ATHEROSCLEROSIS: ICD-10-CM

## 2024-07-25 DIAGNOSIS — G95.89 OTHER SPECIFIED DISEASES OF SPINAL CORD: ICD-10-CM

## 2024-07-25 DIAGNOSIS — I65.23 BILATERAL CAROTID ARTERY STENOSIS: ICD-10-CM

## 2024-07-25 DIAGNOSIS — Z00.00 ENCOUNTER FOR PREVENTIVE HEALTH EXAMINATION: Primary | ICD-10-CM

## 2024-07-25 DIAGNOSIS — Z00.00 ENCOUNTER FOR MEDICARE ANNUAL WELLNESS EXAM: ICD-10-CM

## 2024-07-25 PROCEDURE — 99999 PR PBB SHADOW E&M-EST. PATIENT-LVL V: CPT | Mod: PBBFAC,,, | Performed by: NURSE PRACTITIONER

## 2024-07-25 NOTE — PATIENT INSTRUCTIONS
Counseling and Referral of Other Preventative  (Italic type indicates deductible and co-insurance are waived)    Patient Name: Raffy Rutherford  Today's Date: 7/25/2024    Health Maintenance         Date Due Completion Date    COVID-19 Vaccine (7 - 2023-24 season) Ordered today 10/11/2023    Shingles Vaccine (2 of 2) 08/28/2024 (Originally 11/17/2021) 9/22/2021    Influenza Vaccine (1) 09/01/2024 10/11/2023    Colorectal Cancer Screening 12/17/2024 12/17/2019    Lipid Panel 01/29/2025 1/29/2024    PROSTATE-SPECIFIC ANTIGEN 06/12/2025 6/12/2024    TETANUS VACCINE 03/09/2026 3/9/2016          Orders Placed This Encounter   Procedures    DXA Bone Density Axial Skeleton 1 or more sites       The following information is provided to all patients.  This information is to help you find resources for any of the problems found today that may be affecting your health:                  Living healthy guide: www.[facility].[place].[facility]      Understanding Diabetes: www.diabetes.org      Eating healthy: www.cdc.gov/healthyweight      CDC home safety checklist: www.cdc.gov/steadi/patient.html      Agency on Aging: www.goea.[place].[facility]      Alcoholics anonymous (AA): www.aa.org      Physical Activity: www.valentin.nih.gov/eo5adhj      Tobacco use: www.quitwithusla.org

## 2024-07-25 NOTE — Clinical Note
"Only sending to you because I saw pt for HRA today. He is due for an annual end of August with you which we will have them scheduled but he inquired about "PGX testing" that he states his wife told him is offered to [facility] employees to genetically test for the best med for his depression. States what he is on he is on the max dose. I advised he will need to see you since you are managing this for him. Also wants a new Neurologist, did not like Dr. Patel who he saw in June  Marilu"

## 2024-07-25 NOTE — PROGRESS NOTES
"  Raffy Rutherford presented for a  Medicare AWV and comprehensive Health Risk Assessment today. The following components were reviewed and updated:    Medical history  Family History  Social history  Allergies and Current Medications  Health Risk Assessment  Health Maintenance  Care Team         ** See Completed Assessments for Annual Wellness Visit within the encounter summary.**         The following assessments were completed:  Living Situation  CAGE  Depression Screening  Timed Get Up and Go  Whisper Test  Cognitive Function Screening    Nutrition Screening  ADL Screening  PAQ Screening      Opioid documentation:      Patient does have a current opioid prescription.      Patient accepted further discussion regarding opioid medication use.      Patient is currently taking hydrocodone narcotic for back pain.        Pain level today is 0/10.       In addition to narcotic pain medications, patient is also using NSAIDs for pain control.       Patient is followed by a specialist currently for their pain and will not be referred today.       Patient's opioid risk potential based on ORT-OUD tool:       Eduin each box that applies   No   Yes     Family history of substance abuse   Alcohol [x] []   Illegal drugs [x] []   Rx drugs [x] []     Personal history of substance abuse   Alcohol [x] []   Illegal drugs [x] []   Rx drugs [x] []     Age between 16-45 years   [x]   []     Patient with ADD, OCD, Bipolar disorder, schizoprenia   [x]   []     Patient with depression   []   [x]                         Scoring total                                                                 Non-opioid treatment options have been discussed today and added to the patient's after visit summary.        Vitals:    07/25/24 1032   BP: 132/86   Pulse: 76   Resp: 18   Temp: 98.5 °F (36.9 °C)   TempSrc: Oral   SpO2: 96%   Weight: 82.8 kg (182 lb 8.7 oz)   Height: 5' 8" (1.727 m)     Body mass index is 27.76 kg/m².  Physical Exam  Vitals and nursing " note reviewed.   Constitutional:       Appearance: Normal appearance.   HENT:      Head: Normocephalic.      Nose: Nose normal.      Mouth/Throat:      Mouth: Mucous membranes are moist.   Eyes:      Conjunctiva/sclera: Conjunctivae normal.   Cardiovascular:      Rate and Rhythm: Normal rate and regular rhythm.      Heart sounds: Normal heart sounds.   Pulmonary:      Effort: Pulmonary effort is normal.      Breath sounds: Normal breath sounds.   Abdominal:      General: Bowel sounds are normal.   Musculoskeletal:         General: Normal range of motion.      Cervical back: Normal range of motion.   Skin:     General: Skin is warm and dry.   Neurological:      General: No focal deficit present.      Mental Status: He is alert and oriented to person, place, and time.   Psychiatric:         Mood and Affect: Mood is depressed.         Behavior: Behavior normal.         Thought Content: Thought content normal.         Judgment: Judgment normal.               Diagnoses and health risks identified today and associated recommendations/orders:    1. Encounter for preventive health examination  Exam done    Health Maintenance updated    Records reviewed    2. Encounter for Medicare annual wellness exam  Exam done    Health Maintenance updated    Records reviewed    - Ambulatory Referral/Consult to Enhanced Annual Wellness Visit (eAWV)    3. Other specified diseases of spinal cord  Chronic, followed by PCP    Followed by Neurology    4. Aortic atherosclerosis  Chronic, followed by PCP    Follow low fat, low carb, high fiber diet and exercise.    5. MDD (major depressive disorder), recurrent episode, moderate  Chronic, followed by PCP    On Med, interested in PGX testing    6. Other hyperlipidemia  Follow low fat, low carb, high fiber diet and exercise.    7. Bilateral carotid artery stenosis  Follow low fat, low carb, high fiber diet and exercise.    8. Need for shingles vaccine  Declined for now    9. Osteopenia of neck of  femur, unspecified laterality  - DXA Bone Density Axial Skeleton 1 or more sites; Future    10. BMI 27.0-27.9,adult  BMI reviewed      Provided Raffy with a 5-10 year written screening schedule and personal prevention plan. Recommendations were developed using the USPSTF age appropriate recommendations. Education, counseling, and referrals were provided as needed. After Visit Summary printed and given to patient which includes a list of additional screenings\tests needed.    Follow up in about 1 month (around 8/25/2024) for for annual with PCP Dr. Puentes.    Marilu Ballesteros, ETHEL      I offered to discuss advanced care planning, including how to pick a person who would make decisions for you if you were unable to make them for yourself, called a health care power of , and what kind of decisions you might make such as use of life sustaining treatments such as ventilators and tube feeding when faced with a life limiting illness recorded on a living will that they will need to know. (How you want to be cared for as you near the end of your natural life)     X  Patient has advanced directives on file, which we reviewed, and they do not wish to make changes.

## 2024-07-29 ENCOUNTER — PATIENT OUTREACH (OUTPATIENT)
Dept: OTHER | Facility: OTHER | Age: 74
End: 2024-07-29
Payer: COMMERCIAL

## 2024-07-29 ENCOUNTER — PATIENT MESSAGE (OUTPATIENT)
Dept: PSYCHIATRY | Facility: CLINIC | Age: 74
End: 2024-07-29
Payer: COMMERCIAL

## 2024-07-29 ENCOUNTER — PATIENT MESSAGE (OUTPATIENT)
Dept: OTHER | Facility: OTHER | Age: 74
End: 2024-07-29
Payer: COMMERCIAL

## 2024-07-29 NOTE — PROGRESS NOTES
Connected Back: Patient Outreach    Closing out patient in CB program, due to shoulder rehab. Sent [employer] message. Closing program episode.

## 2024-07-31 ENCOUNTER — CLINICAL SUPPORT (OUTPATIENT)
Dept: REHABILITATION | Facility: HOSPITAL | Age: 74
End: 2024-07-31
Payer: COMMERCIAL

## 2024-07-31 DIAGNOSIS — M25.532 LEFT WRIST PAIN: Primary | ICD-10-CM

## 2024-07-31 PROCEDURE — 97018 PARAFFIN BATH THERAPY: CPT

## 2024-07-31 PROCEDURE — 97112 NEUROMUSCULAR REEDUCATION: CPT

## 2024-07-31 PROCEDURE — 97140 MANUAL THERAPY 1/> REGIONS: CPT

## 2024-08-01 ENCOUNTER — PATIENT MESSAGE (OUTPATIENT)
Dept: INTERNAL MEDICINE | Facility: CLINIC | Age: 74
End: 2024-08-01
Payer: COMMERCIAL

## 2024-08-01 ENCOUNTER — PROCEDURE VISIT (OUTPATIENT)
Dept: DERMATOLOGY | Facility: CLINIC | Age: 74
End: 2024-08-01
Payer: COMMERCIAL

## 2024-08-01 ENCOUNTER — PATIENT MESSAGE (OUTPATIENT)
Dept: NEUROLOGY | Facility: CLINIC | Age: 74
End: 2024-08-01
Payer: COMMERCIAL

## 2024-08-01 ENCOUNTER — OFFICE VISIT (OUTPATIENT)
Dept: DERMATOLOGY | Facility: CLINIC | Age: 74
End: 2024-08-01
Payer: COMMERCIAL

## 2024-08-01 DIAGNOSIS — L72.0 EPIDERMAL INCLUSION CYST: ICD-10-CM

## 2024-08-01 DIAGNOSIS — L70.0 OPEN COMEDONE: ICD-10-CM

## 2024-08-01 DIAGNOSIS — D22.9 MULTIPLE BENIGN MELANOCYTIC NEVI: ICD-10-CM

## 2024-08-01 DIAGNOSIS — L21.9 SEBORRHEIC DERMATITIS: Primary | ICD-10-CM

## 2024-08-01 DIAGNOSIS — Z41.1 ENCOUNTER FOR COSMETIC PROCEDURE: Primary | ICD-10-CM

## 2024-08-01 DIAGNOSIS — Z12.83 SCREENING EXAM FOR SKIN CANCER: ICD-10-CM

## 2024-08-01 DIAGNOSIS — L82.1 SEBORRHEIC KERATOSIS: ICD-10-CM

## 2024-08-01 RX ORDER — TRIAMCINOLONE ACETONIDE 0.25 MG/G
CREAM TOPICAL
Qty: 30 G | Refills: 2 | Status: SHIPPED | OUTPATIENT
Start: 2024-08-01

## 2024-08-01 RX ORDER — KETOCONAZOLE 20 MG/G
CREAM TOPICAL
Qty: 60 G | Refills: 5 | Status: SHIPPED | OUTPATIENT
Start: 2024-08-01

## 2024-08-01 NOTE — PROGRESS NOTES
"  Patient Information  Name: Raffy Rutherford Jr.  : 1950  MRN: [id number]     Referring Physician:  No ref. provider found   Primary Care Physician:  Haseeb Puentes MD   Date of Visit: 2024      Subjective:     History of Present lllness:    Raffy Rutherford Jr. is a 74 y.o. male who presents with a chief complaint of moles, spot, rash, and bump.  Patient is here today for a "mole" check.   Today, patient complains of:    Spot  Location: left post ear  Duration: months  Signs/Symptoms: itching spot  Exacerbating factors: seemed to get worse after recent shoulder surgery  Relieving factors/Prior treatments: ketoconazole cream and pimclorious ointment- helps but always seems to come back    Itching  Location: both axillae, groin  Duration: months  Signs/Symptoms: itching, comes and goes, can feel raw at times  Exacerbating factors: seemed to get worse after recent shoulder surgery  Relieving factors/Prior treatments: Hibiclens, ketoconazole cream- helps calm it down but it comes back    Bump  Location: around eyes  Duration: months  Signs/Symptoms: new white bumps around eyes  Exacerbating factors: none  Relieving factors/Prior treatments: none    Patient was last seen: 2023.  Prior notes by myself reviewed.   Clinical documentation obtained by nursing staff reviewed.    Review of Systems    Objective:   Physical Exam   Constitutional: He appears well-developed and well-nourished. No distress.   Neurological: He is alert and oriented to person, place, and time. He is not disoriented.   Psychiatric: He has a normal mood and affect.   Skin:   Areas Examined (abnormalities noted in diagram):   Scalp / Hair Palpated and Inspected  Head / Face Inspection Performed  Neck Inspection Performed  Chest / Axilla Inspection Performed  Abdomen Inspection Performed  Back Inspection Performed  RUE Inspected  LUE Inspection Performed  RLE Inspected  LLE Inspection Performed                     Diagram Legend     " Erythematous scaling macule/papule c/w actinic keratosis       Vascular papule c/w angioma      Pigmented verrucoid papule/plaque c/w seborrheic keratosis      Yellow umbilicated papule c/w sebaceous hyperplasia      Irregularly shaped tan macule c/w lentigo     1-2 mm smooth white papules consistent with Milia      Movable subcutaneous cyst with punctum c/w epidermal inclusion cyst      Subcutaneous movable cyst c/w pilar cyst      Firm pink to brown papule c/w dermatofibroma      Pedunculated fleshy papule(s) c/w skin tag(s)      Evenly pigmented macule c/w junctional nevus     Mildly variegated pigmented, slightly irregular-bordered macule c/w mildly atypical nevus      Flesh colored to evenly pigmented papule c/w intradermal nevus       Pink pearly papule/plaque c/w basal cell carcinoma      Erythematous hyperkeratotic cursted plaque c/w SCC      Surgical scar with no sign of skin cancer recurrence      Open and closed comedones      Inflammatory papules and pustules      Verrucoid papule consistent consistent with wart     Erythematous eczematous patches and plaques     Dystrophic onycholytic nail with subungual debris c/w onychomycosis     Umbilicated papule    Erythematous-base heme-crusted tan verrucoid plaque consistent with inflamed seborrheic keratosis     Erythematous Silvery Scaling Plaque c/w Psoriasis     See annotation    No images are attached to the encounter or orders placed in the encounter.      [] Data reviewed  [] Prior external notes reviewed  [] Independent review of test  [] Management discussed with another provider  [] Independent historian    Assessment / Plan:        Seborrheic dermatitis  - chronic problem, not at treatment goal  Seborrheic dermatitis is a common skin condition that usually lasts for years. It may be caused by multiple factors, including the yeast that normally lives on our skin, our genes, a cold and dry climate, stress, and a persons overall health.  -      triamcinolone acetonide 0.025% (KENALOG) 0.025 % cream; Apply to affected areas of body folds twice daily as needed for irritation. Do not use for longer than 2 weeks in a row.  Dispense: 30 g; Refill: 2  -     ketoconazole (NIZORAL) 2 % cream; Apply to affected areas of body folds twice daily as needed for irritation.  Dispense: 60 g; Refill: 5    Multiple benign melanocytic nevi  Multiple benign-appearing nevi present on exam today. Reassurance provided. Counseled patient to periodically examine moles and return to clinic if any changes in size, shape, or color are noted or if it becomes symptomatic (bleeding, itching, pain, etc).  Recommend using a broad-spectrum, water-resistant sunscreen with SPF of 30 or higher--reapply every 2 hours. Seek shade, wear sun-protective clothing, and perform regular skin self-exams.    Seborrheic keratosis  These are benign, inherited growths without a malignant potential. Reassurance given to patient. No treatment is necessary.    Epidermal inclusion cysts  Open comedones  Reassurance was given to the patient. No treatment is necessary.    Screening exam for skin cancer  Total body skin examination performed today as noted in physical exam. No lesions suspicious for malignancy were seen.  Recommend using a broad-spectrum, water-resistant sunscreen with SPF of 30 or higher--reapply every 2 hours. Seek shade, wear sun-protective clothing, and perform regular skin self-exams.         Follow up for any new problems or changing lesions.      Mariel Agarwal MD, FAAD  [facility]

## 2024-08-01 NOTE — PROGRESS NOTES
ACNE SURGERY PROCEDURE NOTE    DIAGNOSIS: comedones and cysts    LOCATION: face    Discussed with the patient that this procedure is not covered by insurance because treatment of these benign lesions is considered cosmetic. The patient would like to pursue treatment. He understands that he is responsible for the bill and agrees to pay.     Risk, benefits, and alternatives of acne surgery are discussed with the patient, including but not limited to the risks of hypopigmentation, hyperpigmentation, scar, infection, recurrence of lesion(s), development of new lesion(s), and need for additional treatment of the lesion(s). Verbal consent obtained from patient.     The affected areas were cleaned with alcohol. Lesions were incised with #11 blade. Contents of the comedones and cysts were expressed with a comedo extractor x 10+ lesions. No complications.    Instructed patient on wound care with gentle cleansing and use of Vaseline ointment to keep moist until healed.     Follow up as needed.

## 2024-08-04 ENCOUNTER — PATIENT MESSAGE (OUTPATIENT)
Dept: REHABILITATION | Facility: HOSPITAL | Age: 74
End: 2024-08-04
Payer: COMMERCIAL

## 2024-08-06 ENCOUNTER — PROCEDURE VISIT (OUTPATIENT)
Dept: NEUROLOGY | Facility: CLINIC | Age: 74
End: 2024-08-06
Payer: COMMERCIAL

## 2024-08-06 VITALS
HEART RATE: 68 BPM | BODY MASS INDEX: 27.76 KG/M2 | DIASTOLIC BLOOD PRESSURE: 71 MMHG | WEIGHT: 182.56 LBS | SYSTOLIC BLOOD PRESSURE: 125 MMHG

## 2024-08-06 DIAGNOSIS — G43.909 MIGRAINE WITHOUT STATUS MIGRAINOSUS, NOT INTRACTABLE, UNSPECIFIED MIGRAINE TYPE: ICD-10-CM

## 2024-08-06 DIAGNOSIS — G43.711 CHRONIC MIGRAINE WITHOUT AURA, WITH INTRACTABLE MIGRAINE, SO STATED, WITH STATUS MIGRAINOSUS: Primary | ICD-10-CM

## 2024-08-06 RX ORDER — BUTALBITAL, ACETAMINOPHEN AND CAFFEINE 50; 325; 40 MG/1; MG/1; MG/1
TABLET ORAL
Qty: 15 TABLET | Refills: 0 | Status: SHIPPED | OUTPATIENT
Start: 2024-08-06

## 2024-08-08 ENCOUNTER — OFFICE VISIT (OUTPATIENT)
Dept: INTERNAL MEDICINE | Facility: CLINIC | Age: 74
End: 2024-08-08
Payer: COMMERCIAL

## 2024-08-08 ENCOUNTER — PATIENT MESSAGE (OUTPATIENT)
Dept: REHABILITATION | Facility: OTHER | Age: 74
End: 2024-08-08
Payer: COMMERCIAL

## 2024-08-08 ENCOUNTER — HOSPITAL ENCOUNTER (OUTPATIENT)
Dept: RADIOLOGY | Facility: CLINIC | Age: 74
Discharge: HOME | End: 2024-08-08
Attending: NURSE PRACTITIONER
Payer: COMMERCIAL

## 2024-08-08 ENCOUNTER — CLINICAL SUPPORT (OUTPATIENT)
Dept: REHABILITATION | Facility: HOSPITAL | Age: 74
End: 2024-08-08
Payer: COMMERCIAL

## 2024-08-08 VITALS
SYSTOLIC BLOOD PRESSURE: 104 MMHG | OXYGEN SATURATION: 98 % | WEIGHT: 186.31 LBS | DIASTOLIC BLOOD PRESSURE: 70 MMHG | HEIGHT: 68 IN | BODY MASS INDEX: 28.24 KG/M2 | HEART RATE: 63 BPM

## 2024-08-08 DIAGNOSIS — G60.0 CMT (CHARCOT-MARIE-TOOTH DISEASE): ICD-10-CM

## 2024-08-08 DIAGNOSIS — C61 PROSTATE CANCER: ICD-10-CM

## 2024-08-08 DIAGNOSIS — F33.1 MDD (MAJOR DEPRESSIVE DISORDER), RECURRENT EPISODE, MODERATE: ICD-10-CM

## 2024-08-08 DIAGNOSIS — F41.9 ANXIETY: ICD-10-CM

## 2024-08-08 DIAGNOSIS — M85.859 OSTEOPENIA OF NECK OF FEMUR, UNSPECIFIED LATERALITY: ICD-10-CM

## 2024-08-08 DIAGNOSIS — M25.532 LEFT WRIST PAIN: Primary | ICD-10-CM

## 2024-08-08 DIAGNOSIS — M48.061 SPINAL STENOSIS, LUMBAR REGION, WITHOUT NEUROGENIC CLAUDICATION: ICD-10-CM

## 2024-08-08 DIAGNOSIS — F98.8 ATTENTION DEFICIT DISORDER, UNSPECIFIED HYPERACTIVITY PRESENCE: ICD-10-CM

## 2024-08-08 DIAGNOSIS — M47.819 SPONDYLOSIS WITHOUT MYELOPATHY: ICD-10-CM

## 2024-08-08 DIAGNOSIS — G43.711 CHRONIC MIGRAINE WITHOUT AURA, WITH INTRACTABLE MIGRAINE, SO STATED, WITH STATUS MIGRAINOSUS: ICD-10-CM

## 2024-08-08 DIAGNOSIS — E55.9 VITAMIN D DEFICIENCY: ICD-10-CM

## 2024-08-08 DIAGNOSIS — K21.9 GASTROESOPHAGEAL REFLUX DISEASE WITHOUT ESOPHAGITIS: ICD-10-CM

## 2024-08-08 DIAGNOSIS — E53.8 B12 DEFICIENCY: ICD-10-CM

## 2024-08-08 DIAGNOSIS — Z00.00 ROUTINE PHYSICAL EXAMINATION: Primary | ICD-10-CM

## 2024-08-08 PROCEDURE — 77080 DXA BONE DENSITY AXIAL: CPT | Mod: TC

## 2024-08-08 PROCEDURE — 77080 DXA BONE DENSITY AXIAL: CPT | Mod: 26,,, | Performed by: INTERNAL MEDICINE

## 2024-08-08 PROCEDURE — 97112 NEUROMUSCULAR REEDUCATION: CPT

## 2024-08-08 PROCEDURE — 97018 PARAFFIN BATH THERAPY: CPT

## 2024-08-08 PROCEDURE — 99999 PR PBB SHADOW E&M-EST. PATIENT-LVL III: CPT | Mod: PBBFAC,,, | Performed by: INTERNAL MEDICINE

## 2024-08-08 PROCEDURE — 97140 MANUAL THERAPY 1/> REGIONS: CPT

## 2024-08-08 NOTE — PROGRESS NOTES
"[facility] OUTPATIENT THERAPY AND WELLNESS  Occupational Therapy Treatment Note     Date: 8/8/2024  Name: Raffy Rutherford Jr.  Clinic Number: [id number]    Therapy Diagnosis:   Encounter Diagnosis   Name Primary?    Left wrist pain Yes       Physician: Kaye Solis MD  Physician Orders: eval and treat  Medical Diagnosis: DeQuervains  Onset: since shoulder surgery earlier this year  Evaluation Date: 07/17/2024  Insurance Authorization Period Expiration: 07/15/2025  Plan of Care Expiration: 10/11/2024  Date of Return to MD: PRN  Visit # / Visits authorized: 2/20  FOTO: 1/3     FOTO Lobby Code: KCMQNH      Precautions:  Standard, falls     Time In: 11am  Time Out: 1150am  Total Appointment Time (timed & untimed codes): 50 minutes        Subjective     Patient reports: "this other wrist started hurting me"   He was compliant with home exercise program given last session.   Response to previous treatment: less pain  Functional change: hasn't played yet but will try    Pain: 0/10 (Left) and 3/10 (Right)   Location: left wrists      Objective     Objective Measures updated at progress report unless specified.    Elbow and Wrist ROM. Measured in degrees. WNL     WNL:      Strength (Dynamometer) and Pinch Strength (Pinch Gauge)  Measured in pounds.    7/19/2024 7/19/2024     right left   Rung II 75 65   Key Pinch 13 10   3pt Pinch 12 12      Sensation:   Some numbness along radial and ulnar nerves in hand        Manual Muscle Test    7/19/2024 7/19/2024     Right Left   Wrist Extension  5/5 5/5   Wrist Flexion 5/5 5/5          Limitation/Restriction for FOTO hand Survey     Therapist reviewed FOTO scores for Raffy Rutherford Jr. on 7/17/2024.   FOTO documents entered into [employer] - see Media section.     Limitation Score: 43%  FOTO score not truly reflective of hand due to being 12 weeks post op from shoulder surgery  on the same side            Treatment         Raffy received the treatments listed below:        Raffy " received the following supervised modalities after being cleared for contradictions for 10 minutes:   Patient received paraffin bath to bilateral hand(s) for 10 minutes to increase blood flow, circulation, pain management and for tissue elasticity prior to therex.        manual therapy techniques: Soft tissue Mobilization were applied to the: 15 for Left wrist minutes, including:  Iastym massage with tissue movers to dorsal/radial/volar/ulnar wrist       neuromuscular re-education activities to improve: Sense, Proprioception, and Posture for 25 minutes. The following activities were included:  Use of KT for myofascial correction to decrease pain and increase movement  Isospheres (2 min)   Finklestein stretch       Patient Education and Home Exercises     Education provided:   - HEP  - Progress towards goals     Written Home Exercises Provided: Patient instructed to cont prior HEP.  Exercises were reviewed and Raffy was able to demonstrate them prior to the end of the session.  Raffy demonstrated good  understanding of the home exercise program provided. See electronic medical record under Patient Instructions for exercises provided during therapy sessions.       Assessment     Pt. Reports decrease in symptoms from last session. Reminded to remove tape following 3 days to decrease skin irritation       Raffy is progressing well towards his goals and there are no updates to goals at this time. Pt prognosis is Good.     Patient will continue to benefit from skilled outpatient occupational therapy to address the deficits listed in the problem list on initial evaluation provide patient/family education and to maximize patient's level of independence in the home and community environment.     Patient's spiritual, cultural and educational needs considered and patient agreeable to plan of care and goals.    Anticipated barriers to occupational therapy:     Goals:  Long Term Goals (LTGs); to be met by discharge.  Pt. Will  return to playing cello with 1/10 pain  Pt. Will increase  strength by 5#        Plan     Updates/Grading for next session: cont to progress as able    Vickie Perkins OT   8/8/2024

## 2024-08-13 ENCOUNTER — PATIENT MESSAGE (OUTPATIENT)
Dept: INTERNAL MEDICINE | Facility: CLINIC | Age: 74
End: 2024-08-13
Payer: COMMERCIAL

## 2024-08-14 ENCOUNTER — LAB VISIT (OUTPATIENT)
Dept: LAB | Facility: HOSPITAL | Age: 74
End: 2024-08-14
Attending: INTERNAL MEDICINE
Payer: COMMERCIAL

## 2024-08-14 DIAGNOSIS — G60.0 CMT (CHARCOT-MARIE-TOOTH DISEASE): ICD-10-CM

## 2024-08-14 DIAGNOSIS — C61 PROSTATE CANCER: ICD-10-CM

## 2024-08-14 DIAGNOSIS — M47.819 SPONDYLOSIS WITHOUT MYELOPATHY: ICD-10-CM

## 2024-08-14 DIAGNOSIS — E55.9 VITAMIN D DEFICIENCY: ICD-10-CM

## 2024-08-14 DIAGNOSIS — F41.9 ANXIETY: ICD-10-CM

## 2024-08-14 DIAGNOSIS — E53.8 B12 DEFICIENCY: ICD-10-CM

## 2024-08-14 DIAGNOSIS — F98.8 ATTENTION DEFICIT DISORDER, UNSPECIFIED HYPERACTIVITY PRESENCE: ICD-10-CM

## 2024-08-14 DIAGNOSIS — Z00.00 ROUTINE PHYSICAL EXAMINATION: ICD-10-CM

## 2024-08-14 DIAGNOSIS — K21.9 GASTROESOPHAGEAL REFLUX DISEASE WITHOUT ESOPHAGITIS: ICD-10-CM

## 2024-08-14 DIAGNOSIS — F33.1 MDD (MAJOR DEPRESSIVE DISORDER), RECURRENT EPISODE, MODERATE: ICD-10-CM

## 2024-08-14 DIAGNOSIS — M48.061 SPINAL STENOSIS, LUMBAR REGION, WITHOUT NEUROGENIC CLAUDICATION: ICD-10-CM

## 2024-08-14 DIAGNOSIS — G43.711 CHRONIC MIGRAINE WITHOUT AURA, WITH INTRACTABLE MIGRAINE, SO STATED, WITH STATUS MIGRAINOSUS: ICD-10-CM

## 2024-08-14 LAB
25(OH)D3+25(OH)D2 SERPL-MCNC: 31 NG/ML (ref 30–96)
ALBUMIN SERPL BCP-MCNC: 4 G/DL (ref 3.5–5.2)
ALP SERPL-CCNC: 36 U/L (ref 55–135)
ALT SERPL W/O P-5'-P-CCNC: 18 U/L (ref 10–44)
ANION GAP SERPL CALC-SCNC: 12 MMOL/L (ref 8–16)
AST SERPL-CCNC: 19 U/L (ref 10–40)
BASOPHILS # BLD AUTO: 0.04 K/UL (ref 0–0.2)
BASOPHILS NFR BLD: 0.6 % (ref 0–1.9)
BILIRUB SERPL-MCNC: 0.7 MG/DL (ref 0.1–1)
BUN SERPL-MCNC: 13 MG/DL (ref 8–23)
CALCIUM SERPL-MCNC: 9.3 MG/DL (ref 8.7–10.5)
CHLORIDE SERPL-SCNC: 103 MMOL/L (ref 95–110)
CHOLEST SERPL-MCNC: 195 MG/DL (ref 120–199)
CHOLEST/HDLC SERPL: 3.3 {RATIO} (ref 2–5)
CO2 SERPL-SCNC: 23 MMOL/L (ref 23–29)
CREAT SERPL-MCNC: 0.8 MG/DL (ref 0.5–1.4)
DIFFERENTIAL METHOD BLD: ABNORMAL
EOSINOPHIL # BLD AUTO: 0.2 K/UL (ref 0–0.5)
EOSINOPHIL NFR BLD: 3.3 % (ref 0–8)
ERYTHROCYTE [DISTWIDTH] IN BLOOD BY AUTOMATED COUNT: 12.2 % (ref 11.5–14.5)
EST. GFR  (NO RACE VARIABLE): >60 ML/MIN/1.73 M^2
ESTIMATED AVG GLUCOSE: 111 MG/DL (ref 68–131)
FOLATE SERPL-MCNC: 11 NG/ML (ref 4–24)
GLUCOSE SERPL-MCNC: 96 MG/DL (ref 70–110)
HBA1C MFR BLD: 5.5 % (ref 4–5.6)
HCT VFR BLD AUTO: 46.8 % (ref 40–54)
HDLC SERPL-MCNC: 60 MG/DL (ref 40–75)
HDLC SERPL: 30.8 % (ref 20–50)
HGB BLD-MCNC: 14.8 G/DL (ref 14–18)
IMM GRANULOCYTES # BLD AUTO: 0.02 K/UL (ref 0–0.04)
IMM GRANULOCYTES NFR BLD AUTO: 0.3 % (ref 0–0.5)
LDLC SERPL CALC-MCNC: 110 MG/DL (ref 63–159)
LYMPHOCYTES # BLD AUTO: 2.7 K/UL (ref 1–4.8)
LYMPHOCYTES NFR BLD: 42 % (ref 18–48)
MCH RBC QN AUTO: 30 PG (ref 27–31)
MCHC RBC AUTO-ENTMCNC: 31.6 G/DL (ref 32–36)
MCV RBC AUTO: 95 FL (ref 82–98)
MONOCYTES # BLD AUTO: 0.5 K/UL (ref 0.3–1)
MONOCYTES NFR BLD: 7.6 % (ref 4–15)
NEUTROPHILS # BLD AUTO: 3 K/UL (ref 1.8–7.7)
NEUTROPHILS NFR BLD: 46.2 % (ref 38–73)
NONHDLC SERPL-MCNC: 135 MG/DL
NRBC BLD-RTO: 0 /100 WBC
PLATELET # BLD AUTO: 244 K/UL (ref 150–450)
PMV BLD AUTO: 9.5 FL (ref 9.2–12.9)
POTASSIUM SERPL-SCNC: 4.3 MMOL/L (ref 3.5–5.1)
PROT SERPL-MCNC: 7 G/DL (ref 6–8.4)
RBC # BLD AUTO: 4.94 M/UL (ref 4.6–6.2)
SODIUM SERPL-SCNC: 138 MMOL/L (ref 136–145)
TESTOST SERPL-MCNC: 979 NG/DL (ref 304–1227)
TRIGL SERPL-MCNC: 125 MG/DL (ref 30–150)
TSH SERPL DL<=0.005 MIU/L-ACNC: 0.87 UIU/ML (ref 0.4–4)
VIT B12 SERPL-MCNC: 729 PG/ML (ref 210–950)
WBC # BLD AUTO: 6.41 K/UL (ref 3.9–12.7)

## 2024-08-14 PROCEDURE — 36415 COLL VENOUS BLD VENIPUNCTURE: CPT | Performed by: INTERNAL MEDICINE

## 2024-08-14 PROCEDURE — 80061 LIPID PANEL: CPT | Performed by: INTERNAL MEDICINE

## 2024-08-14 PROCEDURE — 83036 HEMOGLOBIN GLYCOSYLATED A1C: CPT | Performed by: INTERNAL MEDICINE

## 2024-08-14 PROCEDURE — 84403 ASSAY OF TOTAL TESTOSTERONE: CPT | Performed by: INTERNAL MEDICINE

## 2024-08-14 PROCEDURE — 84443 ASSAY THYROID STIM HORMONE: CPT | Performed by: INTERNAL MEDICINE

## 2024-08-14 PROCEDURE — 80053 COMPREHEN METABOLIC PANEL: CPT | Performed by: INTERNAL MEDICINE

## 2024-08-14 PROCEDURE — 82746 ASSAY OF FOLIC ACID SERUM: CPT | Performed by: INTERNAL MEDICINE

## 2024-08-14 PROCEDURE — 82306 VITAMIN D 25 HYDROXY: CPT | Performed by: INTERNAL MEDICINE

## 2024-08-14 PROCEDURE — 82607 VITAMIN B-12: CPT | Performed by: INTERNAL MEDICINE

## 2024-08-14 PROCEDURE — 85025 COMPLETE CBC W/AUTO DIFF WBC: CPT | Performed by: INTERNAL MEDICINE

## 2024-08-14 RX ORDER — ALENDRONATE SODIUM 70 MG/1
70 TABLET ORAL
Qty: 4 TABLET | Refills: 11 | Status: SHIPPED | OUTPATIENT
Start: 2024-08-14 | End: 2025-08-14

## 2024-08-15 ENCOUNTER — PATIENT MESSAGE (OUTPATIENT)
Dept: INTERNAL MEDICINE | Facility: CLINIC | Age: 74
End: 2024-08-15
Payer: COMMERCIAL

## 2024-08-15 ENCOUNTER — PATIENT MESSAGE (OUTPATIENT)
Dept: REHABILITATION | Facility: HOSPITAL | Age: 74
End: 2024-08-15

## 2024-08-15 ENCOUNTER — PATIENT MESSAGE (OUTPATIENT)
Dept: NEUROLOGY | Facility: CLINIC | Age: 74
End: 2024-08-15
Payer: COMMERCIAL

## 2024-08-15 DIAGNOSIS — M47.819 SPONDYLOSIS WITHOUT MYELOPATHY: Primary | ICD-10-CM

## 2024-08-15 DIAGNOSIS — M51.37 DEGENERATION OF LUMBAR OR LUMBOSACRAL INTERVERTEBRAL DISC: ICD-10-CM

## 2024-08-16 RX ORDER — METHYLPREDNISOLONE 4 MG/1
TABLET ORAL
Qty: 21 EACH | Refills: 0 | Status: SHIPPED | OUTPATIENT
Start: 2024-08-16 | End: 2024-09-06

## 2024-08-16 NOTE — TELEPHONE ENCOUNTER
Pt requesting order for Functional  [facility] as recommended by OT    Referral to [facility] pended in encounter for your review

## 2024-08-19 ENCOUNTER — PATIENT MESSAGE (OUTPATIENT)
Dept: INTERNAL MEDICINE | Facility: CLINIC | Age: 74
End: 2024-08-19
Payer: COMMERCIAL

## 2024-08-20 ENCOUNTER — PATIENT MESSAGE (OUTPATIENT)
Dept: SPORTS MEDICINE | Facility: CLINIC | Age: 74
End: 2024-08-20
Payer: COMMERCIAL

## 2024-08-23 ENCOUNTER — TELEPHONE (OUTPATIENT)
Dept: ADMINISTRATIVE | Facility: OTHER | Age: 74
End: 2024-08-23
Payer: COMMERCIAL

## 2024-08-23 ENCOUNTER — DOCUMENTATION ONLY (OUTPATIENT)
Dept: HEMATOLOGY/ONCOLOGY | Facility: CLINIC | Age: 74
End: 2024-08-23
Payer: COMMERCIAL

## 2024-08-23 LAB
ONEOME COMMENT: NORMAL
ONEOME METHOD: NORMAL

## 2024-08-23 NOTE — PROGRESS NOTES
"PGx Consult Note    Clinical Recommendations based on PGx Results  No medication changes recommended at this time per PGx Profile.    Patient's current medications are NOT metabolized by either of his two clinically actionable variants (see Future Medication Considerations for full PGx recommendations). May continue all medications as clinically indicated.    Patient has clinically actionable variation in ICP8X44 which is indicated in the metabolism of various antidepressants. For assistance in prescribing antidepressants, please use the "Antidepressants with PGx" Smart Set in the Orders tab.    Past intolerance/inefficacies and allergies   NVE0C82 Ultrarapid Metabolizer  Patient's PGx could explain a lack of benefit from previous use of citalopram (Celexa), pantoprazole (Protonix) and/or imipramine.    Future Medication Considerations based on PGx Results  Patient has 2 actionable phenotypes that may impact future medication selection. See below for all PGx recommendations.    DKX3D53 Ultrarapid Metabolizer  Citalopram/Escitalopram: Consider an alternative agent not metabolized by OFV1X44 due to increased metabolism, leading to lower plasma concentrations and higher probability of therapeutic failure.  Voriconazole: Choose an alternative agent that is not dependent on MNC7U82 metabolism as primary therapy in lieu of voriconazole. Such agents include isavuconazole, liposomal amphotericin B, and posaconazole. The probability of therapeutic concentrations is modest with standard voriconazole dosing.   TCAs: Avoid tertiary amine use (I.e., Amitriptyline, Doxepin, etc.) due to potential for sub-optimal response. Consider alternative drug not metabolized by KSF2T62. TCAs without major UYL1S60 metabolism include the secondary amines nortriptyline and desipramine. If a tertiary amine is warranted, utilize therapeutic drug monitoring to guide dose adjustments.   PPIs (excluding esomeprazole): Increase starting daily dose by " 100%. Daily dose may be given in divided doses. Monitor for efficacy   PPIs (excluding esomeprazole and rabeprazole): Increase starting daily dose by 100%. Daily dose may be given in divided doses. Monitor for efficacy.    UGT1A1 Poor Metabolizer:  Atazanivir: Consider an alternative agent due to increased bilirubin-related jaundice and high likelihood of patient discontinuation.   Irinotecan: Serious life-threatening adverse reactions can occur. Start with 70% of the standard dose and guide therapy by the neutrophil count.   Belinostat: Reduce the starting dose to 750 mg/m2 due to higher systemic concentrations and higher adverse reaction risk.   Sacitzumab-govitecan-hziy (TRODELVY): Increase monitoring for severe neutropenia and other adverse effects given reduced metabolism of small molecule moiety SN-38). Currently, no dose adjustment recommendations for patients with UGT1A1 reduced activity exist, therefore, dose should be individualized based on patient tolerance to treatment.      Thank you for this consult. If you have any additional questions, please don't hesitate to reach out to me or contact [phone] (GENE).    Kirsty Crandall, PharmD  Clinical Pharmacogenomics Pharmacist

## 2024-08-23 NOTE — TELEPHONE ENCOUNTER
Left voice message for patient to return call to schedule appointment from referral to Functional restoration Department.Contact number provided, and My Chart message to be sent.  Tiffany DURÁN [phone]

## 2024-08-26 ENCOUNTER — PATIENT MESSAGE (OUTPATIENT)
Dept: INTERNAL MEDICINE | Facility: CLINIC | Age: 74
End: 2024-08-26
Payer: COMMERCIAL

## 2024-08-30 ENCOUNTER — TELEPHONE (OUTPATIENT)
Dept: ADMINISTRATIVE | Facility: OTHER | Age: 74
End: 2024-08-30
Payer: COMMERCIAL

## 2024-08-30 NOTE — TELEPHONE ENCOUNTER
Left voice message for patient to return call to schedule appointment from referral to Functional Restoration department. Contact number provided, and My Chart message to be sent.  Tiffany DURÁN [phone]

## 2024-09-03 ENCOUNTER — HOSPITAL ENCOUNTER (OUTPATIENT)
Dept: RADIOLOGY | Facility: HOSPITAL | Age: 74
Discharge: HOME | End: 2024-09-03
Attending: RADIOLOGY
Payer: COMMERCIAL

## 2024-09-03 ENCOUNTER — PATIENT MESSAGE (OUTPATIENT)
Dept: RADIATION ONCOLOGY | Facility: CLINIC | Age: 74
End: 2024-09-03
Payer: COMMERCIAL

## 2024-09-03 DIAGNOSIS — C61 PROSTATE CANCER: ICD-10-CM

## 2024-09-03 PROCEDURE — 72197 MRI PELVIS W/O & W/DYE: CPT | Mod: 26,,, | Performed by: STUDENT IN AN ORGANIZED HEALTH CARE EDUCATION/TRAINING PROGRAM

## 2024-09-03 PROCEDURE — 72197 MRI PELVIS W/O & W/DYE: CPT | Mod: TC

## 2024-09-03 PROCEDURE — A9585 GADOBUTROL INJECTION: HCPCS | Performed by: RADIOLOGY

## 2024-09-03 PROCEDURE — 25500020 PHARM REV CODE 255: Performed by: RADIOLOGY

## 2024-09-03 RX ORDER — GADOBUTROL 604.72 MG/ML
9 INJECTION INTRAVENOUS
Status: COMPLETED | OUTPATIENT
Start: 2024-09-03 | End: 2024-09-03

## 2024-09-03 RX ADMIN — GADOBUTROL 9 ML: 604.72 INJECTION INTRAVENOUS at 02:09

## 2024-09-05 ENCOUNTER — OFFICE VISIT (OUTPATIENT)
Dept: OPHTHALMOLOGY | Facility: CLINIC | Age: 74
End: 2024-09-05
Payer: COMMERCIAL

## 2024-09-05 DIAGNOSIS — H04.553 NLDO, ACQUIRED (NASOLACRIMAL DUCT OBSTRUCTION), BILATERAL: ICD-10-CM

## 2024-09-05 DIAGNOSIS — H02.886 MEIBOMIAN GLAND DYSFUNCTION (MGD) OF BOTH EYES: Primary | ICD-10-CM

## 2024-09-05 DIAGNOSIS — H02.883 MEIBOMIAN GLAND DYSFUNCTION (MGD) OF BOTH EYES: Primary | ICD-10-CM

## 2024-09-05 DIAGNOSIS — H10.45 CHRONIC ALLERGIC CONJUNCTIVITIS: ICD-10-CM

## 2024-09-05 DIAGNOSIS — H04.123 DRY EYE SYNDROME OF BOTH EYES: ICD-10-CM

## 2024-09-05 PROCEDURE — 99999 PR PBB SHADOW E&M-EST. PATIENT-LVL III: CPT | Mod: PBBFAC,,, | Performed by: OPHTHALMOLOGY

## 2024-09-05 RX ORDER — DOXYCYCLINE HYCLATE 100 MG
100 TABLET ORAL 2 TIMES DAILY
Qty: 14 TABLET | Refills: 0 | Status: SHIPPED | OUTPATIENT
Start: 2024-09-05 | End: 2024-09-17

## 2024-09-06 ENCOUNTER — TELEPHONE (OUTPATIENT)
Dept: HEMATOLOGY/ONCOLOGY | Facility: CLINIC | Age: 74
End: 2024-09-06
Payer: COMMERCIAL

## 2024-09-07 NOTE — TELEPHONE ENCOUNTER
Refill Routing Note   Medication(s) are not appropriate for processing by [facility] for the following reason(s):        Outside of protocol    [facility] action(s):  Route             Appointments  past 12m or future 3m with PCP    Date Provider   Last Visit   8/8/2024 Haseeb Puentes MD   Next Visit   Visit date not found Haseeb Puentes MD   ED visits in past 90 days: 0        Note composed:4:38 PM 09/07/2024

## 2024-09-07 NOTE — TELEPHONE ENCOUNTER
Care Due:                  Date            Visit Type   Department     Provider  --------------------------------------------------------------------------------                                MYCHART                              ANNUAL                              CHECKUP/PHY  [facility] INTERNAL  Last Visit: 08-      [facility]       Haseeb Puentes  Next Visit: None Scheduled  None         None Found                                                            Last  Test          Frequency    Reason                     Performed    Due Date  --------------------------------------------------------------------------------    Mg Level....  12 months..  alendronate..............  Not Found    Overdue    Phosphate...  12 months..  alendronate..............  Not Found    Overdue    [employer] Embedded Care Due Messages. Reference number: [id number].   9/06/2024 9:00:31 PM CDT

## 2024-09-09 ENCOUNTER — OFFICE VISIT (OUTPATIENT)
Dept: RADIATION ONCOLOGY | Facility: CLINIC | Age: 74
End: 2024-09-09
Attending: RADIOLOGY
Payer: COMMERCIAL

## 2024-09-09 DIAGNOSIS — C61 PROSTATE CANCER: Primary | ICD-10-CM

## 2024-09-09 PROCEDURE — 1160F RVW MEDS BY RX/DR IN RCRD: CPT | Mod: CPTII,95,, | Performed by: RADIOLOGY

## 2024-09-09 PROCEDURE — 1157F ADVNC CARE PLAN IN RCRD: CPT | Mod: CPTII,95,, | Performed by: RADIOLOGY

## 2024-09-09 PROCEDURE — 99212 OFFICE O/P EST SF 10 MIN: CPT | Mod: 95,,, | Performed by: RADIOLOGY

## 2024-09-09 PROCEDURE — 1159F MED LIST DOCD IN RCRD: CPT | Mod: CPTII,95,, | Performed by: RADIOLOGY

## 2024-09-09 PROCEDURE — 3044F HG A1C LEVEL LT 7.0%: CPT | Mod: CPTII,95,, | Performed by: RADIOLOGY

## 2024-09-09 RX ORDER — LAMOTRIGINE 200 MG/1
200 TABLET ORAL DAILY
Qty: 90 TABLET | Refills: 3 | Status: SHIPPED | OUTPATIENT
Start: 2024-09-09

## 2024-09-09 RX ORDER — ERGOCALCIFEROL 1.25 MG/1
50000 CAPSULE ORAL
Qty: 12 CAPSULE | Refills: 3 | Status: SHIPPED | OUTPATIENT
Start: 2024-09-09

## 2024-09-10 NOTE — PROGRESS NOTES
The patient location is: home  The chief complaint leading to consultation is: prostate cancer    Visit type: audiovisual    Face to Face time with patient: 10 minutes   15 minutes of total time spent on the encounter, which includes face to face time and non-face to face time preparing to see the patient (eg, review of tests), Obtaining and/or reviewing separately obtained history, Documenting clinical information in the electronic or other health record, Independently interpreting results (not separately reported) and communicating results to the patient/family/caregiver, or Care coordination (not separately reported).     Each patient to whom he or she provides medical services by telemedicine is:  (1) informed of the relationship between the physician and patient and the respective role of any other health care provider with respect to management of the patient; and (2) notified that he or she may decline to receive medical services by telemedicine and may withdraw from such care at any time.    Notes:    [facility] - Radiation Oncology     Patient ID: Raffy Rutherford Jr. is a 74 y.o. male.    Chief Complaint: No chief complaint on file.      Mr. Rutherford has a history clinical stage IIB (T1c, N0, M0, PSA < 10, GG2) prostate cancer.  He presented for further evaluation after repeat PSA on 5/25/21 returned at 8.2 ng/ml.  MRI on 5/25/21 revealed a 46.7 cc prostate with a 1.3 cm T2 hypointense lesion in the Rt. mid gland, PI-RADS 4.  There was no extraprostatic extension.  The seminal vesicles and neurovascular bundles were unremarkable.  Biopsies on 6/15/21 revealed [place] 7 (3+4) adenocarcinoma involving 29% of 4 of 6 cores from the targeted lesion in the Rt. apex.  The [place] pattern 4 accounted for 10 - 20% of the tumor.  There was a small focus of atypical glands at the Lt. apex but the remaining biopsies revealed benign prostatic tissue.  Polaris recurrence score returned at 3.2 which  is at the borderline of active surveillance and single modality treatment.   The patient has continued with active surveillance.  Today the patient states he feels well.  No  complaints.       Review of Systems   Constitutional:  Negative for activity change, appetite change, chills and fatigue.   Genitourinary:  Negative for bladder incontinence, difficulty urinating, dysuria, frequency and hematuria.     Physical Exam  Constitutional:       General: He is not in acute distress.     Appearance: Normal appearance.   Neurological:      Mental Status: He is alert and oriented to person, place, and time.   Psychiatric:         Mood and Affect: Mood normal.         Judgment: Judgment normal.        Latest Reference Range & Units 01/29/24 14:20 06/12/24 09:40   PSA Diagnostic 0.00 - 4.00 ng/mL 9.3 (H) 9.0 (H)   (H): Data is abnormally high    MRI on 9/3/24 revealed Stable PI-RADS 4 lesions in the anterior peripheral and transition zones. Lesions abut each other and may be contiguous.        Assessment and Plan    Prostate cancer - Discussed MRI findings.  Reviewed the images.  No evidence of tumor progression. Plan follow PSA in January

## 2024-10-06 NOTE — PROGRESS NOTES
CC: Left shoulder follow-up    DATE OF PROCEDURE: 4/19/2024   OPERATION:   Left  1. Shoulder arthroscopic rotator cuff repair (CPT 45902)  2. Shoulder arthroscopic biceps tenodesis (CPT 92040)  3. Shoulder arthroscopic extensive debridement (CPT 08293)    4. Shoulder arthroscopic subacromial decompression      Raffy Rutherford Jr. presents today for follow up appointment of his left shoulder. Patient is now 5 months 19 days status post above procedure.  He is doing very well.  No significant pain.  Does have some mild subjective ongoing weakness but getting better.  Pleased overall with his recovery.    Prior Hx 7/22/2024:  Raffy Rutherford Jr. presents today for follow up appointment of his left shoulder. Patient is now 3 months 3 days status post above procedure. Continues PT at [facility].  Doing well.  He does have some intermittent pain still but making progress.  Pleased with his recovery and wants to begin playing the cello again.    Prior Hx 5/30/2024:   Raffy Rutherford Jr. reports to be doing well 6 weeks s/p the above mentioned procedure.  Physical therapy at [facility].  Doing well overall.  He does have some intermittent pain in the shoulder.  He does report he had a fall about 2 weeks out from surgery.  No reported injury necessarily to the left shoulder.  It sounds like the postop pain is getting better.    PAST MEDICAL HISTORY:   Past Medical History:   Diagnosis Date    Allergy     Arthritis     Back pain     after trauma beginning in 89    Cataract     Chronic pain     neck and left shoulder    Cluster headache 2013    Colon polyps 2007 2007-2019: TA x5, HP x3    Degenerative disc disease     Depression     Diverticulitis 12/2013    Dry eye syndrome     Fibromyalgia 2013    GERD (gastroesophageal reflux disease)     Hepatitis 1970's    A    History of prostate biopsy 2002    Hyperlipidemia     Joint pain     Prostate cancer 07/2021    PVD (posterior vitreous detachment)     Salzmann's nodular dystrophy of  left eye     Sleep apnea     Thyroid nodule 07/16/2014    Trigeminal neuralgia of left side of face     Tubular adenoma of colon 2007    5 removed [id number]    Visual disturbance 2012    problems after cataract surgery     PAST SURGICAL HISTORY:  Past Surgical History:   Procedure Laterality Date    ARTHROSCOPIC DEBRIDEMENT OF SHOULDER Left 04/19/2024    Procedure: DEBRIDEMENT, SHOULDER, ARTHROSCOPIC;  Surgeon: GEORGIANA Up MD;  Location: [facility] OR;  Service: Orthopedics;  Laterality: Left;  Regional w/o Catheter, Interscalene, 0.5% Marcaine Plain    ARTHROSCOPIC REPAIR OF ROTATOR CUFF OF SHOULDER Left 04/19/2024    Procedure: REPAIR, ROTATOR CUFF, ARTHROSCOPIC;  Surgeon: GEORGIANA Up MD;  Location: [facility] OR;  Service: Orthopedics;  Laterality: Left;    ARTHROSCOPY OF SHOULDER WITH DECOMPRESSION OF SUBACROMIAL SPACE Left 04/19/2024    Procedure: ARTHROSCOPY, SHOULDER, WITH SUBACROMIAL SPACE DECOMPRESSION;  Surgeon: GEORGIANA Up MD;  Location: [facility] OR;  Service: Orthopedics;  Laterality: Left;    ARTHROSCOPY,SHOULDER,WITH BICEPS TENODESIS Left 04/19/2024    Procedure: ARTHROSCOPY,SHOULDER,WITH BICEPS TENODESIS;  Surgeon: GEORGIANA Up MD;  Location: [facility] OR;  Service: Orthopedics;  Laterality: Left;    BACK SURGERY      CARPAL TUNNEL RELEASE Right 05/18/2021    Procedure: RELEASE, CARPAL TUNNEL;  Surgeon: Kaye Solis MD;  Location: [facility] OR;  Service: Orthopedics;  Laterality: Right;    CATARACT EXTRACTION W/  INTRAOCULAR LENS IMPLANT Bilateral     CHOLECYSTECTOMY      COLONOSCOPY N/A 12/17/2019    Normal - Repeat 5yrs    COLONOSCOPY  2007 2007 TA x2, 2011 TA x3, 2014 HP x3, 2019 normal    COSMETIC SURGERY  02/10/2015    Direct mid-forehead brow lift    COSMETIC SURGERY  02/10/2015    Bilateral upper lid blepahroplasty    CYSTOSCOPY WITH URETEROSCOPY, RETROGRADE PYELOGRAPHY, AND INSERTION OF STENT Left 08/19/2020    Procedure: CYSTOSCOPY, WITH RETROGRADE PYELOGRAM AND URETERAL STENT  INSERTION;  Surgeon: Katelynn George MD;  Location: [facility];  Service: Urology;  Laterality: Left;    ESOPHAGOGASTRODUODENOSCOPY N/A 12/17/2019    Procedure: ESOPHAGOGASTRODUODENOSCOPY (EGD);  Surgeon: Amadou Hardin MD;  [facility]  Service: Endoscopy;  Laterality: N/A;    EYE SURGERY      FUSION OF LUMBAR SPINE BY ANTERIOR APPROACH N/A 08/20/2020    Procedure: FUSION, SPINE, LUMBAR, ANTERIOR APPROACH L5-S1 ALIF Stand Alone;  Surgeon: Jason Caldwell MD;  Location: [facility];  Service: Neurosurgery;  Laterality: N/A;    HEMORRHOID SURGERY      with complication of chronic bleeding for 6 weeks     INJECTION OF STEROID Right 12/06/2018    Procedure: INJECTION, STEROID Right SI Joint Block and Steroid Injection;  Surgeon: Jason Caldwell MD;  Location: [facility];  Service: Neurosurgery;  Laterality: Right;  Procedure: Right SI Joint Block and Steroid Injection  Surgery Time: 30 MIN  LOS: 0  Anesthesia: MAC  Radiology: C-arm  Bed: [place] [address] Poster  Position: Prone    INJECTION OF STEROID Right 09/19/2019    Procedure: INJECTION, STEROID Procedure: Right SI joint block nd steroid injection;  Surgeon: Jason Caldwell MD;  Location: [facility];  Service: Neurosurgery;  Laterality: Right;  Procedure: Right SI joint block nd steroid injection  Surgery Time: 30 mins  LOS:   Anesthesia: General MAC  Radiology:C-arm  Bed: Regular Bed  Position: Prone    IRRIGATION AND DEBRIDEMENT OF UPPER EXTREMITY Left 04/07/2022    Procedure: IRRIGATION AND DEBRIDEMENT, UPPER EXTREMITY;  Surgeon: Kaye Solis MD;  Location: [facility];  Service: Orthopedics;  Laterality: Left;    JOINT REPLACEMENT      KNEE SURGERY      involving arthroscopic surgery to both knees    REPAIR OF EXTENSOR TENDON Left 04/07/2022    Procedure: REPAIR, TENDON, EXTENSOR thumb, EPB and EPL;  Surgeon: Kaye Solis MD;  Location: [facility];  Service: Orthopedics;  Laterality: Left;    SHOULDER SURGERY      SINUS SURGERY      left molar and sinus  surgery for trigeminal neuralgia    SPINAL FUSION  06/22/2015    L3-L5 XLIF/TANA    TOTAL HIP ARTHROPLASTY  04/2012    Pt states he had total hip replacement on his left hip.    ULNAR NERVE TRANSPOSITION Left 12/16/2020    Procedure: TRANSPOSITION, NERVE, ULNAR - left carpal and cubital tunnel releases;  Surgeon: Addi Bauer MD;  Location: [facility];  Service: Orthopedics;  Laterality: Left;     FAMILY HISTORY:  Family History   Problem Relation Name Age of Onset    Stroke Mother Honey 86    Colon cancer Mother Honey         early/mid-60s    Uterine cancer Mother Honey 77    Pancreatitis Brother Will         acute, now s/p nathalia    Diabetes Brother Will     Macular degeneration Brother Will     Other Brother Will         foot drop    Other Father Raffy,Sr. 44        pituitary tumor- CA vs benign?    Heart attack Maternal Grandfather mgf         suspected, 50s    Migraines Sister Ida     Crohn's disease Sister Ida         w/ assoc joint issues    Arthritis Sister Ida     Tremor Daughter Rocio     Irritable bowel syndrome Carlitos Hidalgo         leaning toward Crohn's dx    Neurological Disorders Son Rocky         nonspecific, benign fasciculations of calves    Tremor Son Rocky     Jaundice Grandchild Jocelyne     Other Maternal Uncle Jesse         memory issue    Other Maternal Uncle Real         memory issue    Cancer Maternal Cousin Ira         origin? maybe thyroid? 40s?    Melanoma Neg Hx      Amblyopia Neg Hx      Blindness Neg Hx      Cataracts Neg Hx      Glaucoma Neg Hx      Hypertension Neg Hx      Retinal detachment Neg Hx      Strabismus Neg Hx      Thyroid disease Neg Hx      Allergic rhinitis Neg Hx      Allergies Neg Hx      Angioedema Neg Hx      Asthma Neg Hx      Eczema Neg Hx      Urticaria Neg Hx      Rhinitis Neg Hx      Immunodeficiency Neg Hx      Atopy Neg Hx       MEDICATIONS:    Current Outpatient Medications:     alendronate (FOSAMAX) 70 MG tablet, Take 1 tablet (70 mg total) by  mouth every 7 days., Disp: 4 tablet, Rfl: 11    atorvastatin (LIPITOR) 40 MG tablet, Take 1 tablet (40 mg total) by mouth once daily., Disp: 90 tablet, Rfl: 1    butalbital-acetaminophen-caffeine -40 mg (FIORICET, ESGIC) -40 mg per tablet, Take 1 tablet by mouth daily as needed for 30 days., Disp: 15 tablet, Rfl: 0    celecoxib (CELEBREX) 100 MG capsule, Take 2 capsules (200 mg total) by mouth 2 (two) times daily., Disp: 180 capsule, Rfl: 3    clonazePAM (KLONOPIN) 0.5 MG tablet, Take 1 tablet by mouth twice a day as needed for anxiety, Disp: 60 tablet, Rfl: 5    ergocalciferol (VITAMIN D2) 50,000 unit Cap, Take 1 capsule (50,000 Units total) by mouth every 7 days., Disp: 12 capsule, Rfl: 3    fremanezumab-vfrm (AJOVY SYRINGE) 225 mg/1.5 mL injection, Inject 1.5 mLs (225 mg total) into the skin every 28 days., Disp: 1.5 mL, Rfl: 5    HYDROcodone-acetaminophen (NORCO) 5-325 mg per tablet, Take 1 tablet by mouth 4 (four) times daily as needed., Disp: 120 tablet, Rfl: 0    hydrocortisone-pramoxine (ANALPRAM-HC) 2.5-1 % Crea, Place rectally 3 (three) times daily., Disp: 30 g, Rfl: 2    ketoconazole (NIZORAL) 2 % cream, Apply to affected areas of body folds twice daily as needed for irritation., Disp: 60 g, Rfl: 5    lamoTRIgine (LAMICTAL) 200 MG tablet, Take 1 tablet (200 mg total) by mouth once daily., Disp: 90 tablet, Rfl: 3    methocarbamoL (ROBAXIN) 750 MG Tab, Take 1 tablet (750 mg total) by mouth 3 (three) times daily., Disp: 60 tablet, Rfl: 2    pregabalin (LYRICA) 25 MG capsule, Take 1 capsule (25 mg total) by mouth 2 (two) times a day., Disp: 180 capsule, Rfl: 0    pregabalin (LYRICA) 75 MG capsule, Take 3 capsules (225 mg total) by mouth every evening., Disp: 270 capsule, Rfl: 0    RABEprazole (ACIPHEX) 20 mg tablet, Take 1 tablet (20 mg total) by mouth 2 (two) times daily., Disp: 180 tablet, Rfl: 3    selegiline (EMSAM) 12 mg/24 hr, Place 1 patch onto the skin once daily., Disp: 30 patch, Rfl:  11    traZODone (DESYREL) 100 MG tablet, Take 1 tablet (100 mg total) by mouth every evening., Disp: 90 tablet, Rfl: 6    triamcinolone acetonide 0.025% (KENALOG) 0.025 % cream, Apply to affected areas of body folds twice daily as needed for irritation. Do not use for longer than 2 weeks in a row., Disp: 30 g, Rfl: 2    ubrogepant (UBRELVY) 100 mg tablet, Take 1 tablet (100 mg total) by mouth every 2 (two) hours as needed for Migraine. Max 200 mg/day., Disp: 16 tablet, Rfl: 5    XIIDRA 5 % Dpet, Apply 1 drop to eye 2 (two) times a day., Disp: 60 each, Rfl: 11    clindamycin (CLEOCIN) 150 MG capsule, Take 4 capsules (600 mg total) by mouth 1 hour prior to dental appointment. (Patient not taking: Reported on 10/8/2024), Disp: 12 capsule, Rfl: 1    cycloSPORINE (VEVYE) 0.1 % Drop, Place 1 drop into both eyes 2 (two) times a day. (Patient not taking: Reported on 10/8/2024), Disp: 2 mL, Rfl: 11    HYDROcodone-acetaminophen (NORCO) 5-325 mg per tablet, Take 1 tablet by mouth every 8 (eight) hours as needed for Pain. (Patient not taking: Reported on 10/8/2024), Disp: 90 tablet, Rfl: 0    HYDROcodone-acetaminophen (NORCO) 5-325 mg per tablet, Take 1 tablet by mouth 4 (four) times daily as needed., Disp: 120 tablet, Rfl: 0    perfluorohexyloctane, PF, 100 % Drop, Place 1 drop into both eyes 4 (four) times daily. (Patient not taking: Reported on 10/8/2024), Disp: 3 mL, Rfl: 11    pregabalin (LYRICA) 25 MG capsule, Take 2 capsules (50 mg total) by mouth once daily., Disp: 90 capsule, Rfl: 1    pregabalin (LYRICA) 25 MG capsule, Take 1 capsule (25 mg total) by mouth 2 (two) times a day. (Patient not taking: Reported on 10/8/2024), Disp: 180 capsule, Rfl: 0    pregabalin (LYRICA) 75 MG capsule, Take 3 capsules by mouth at night (Patient not taking: Reported on 10/8/2024), Disp: 90 capsule, Rfl: 1    pregabalin (LYRICA) 75 MG capsule, Take 3 capsules (225 mg total) by mouth nightly. (Patient not taking: Reported on 10/8/2024),  "Disp: 270 capsule, Rfl: 0    Current Facility-Administered Medications:     onabotulinumtoxina injection 200 Units, 200 Units, Intramuscular, q12 weeks, Macie Pearson FNP, 200 Units at 08/06/24 1351    ALLERGIES:  Review of patient's allergies indicates:   Allergen Reactions    Alphagan [brimonidine]      Patient taking MASO-B Selective Inhibitor Selegiline (Emsam)    Coumadin [warfarin]      itch    Oxycodone      hiccups     REVIEW OF SYSTEMS:  Constitution: Negative. Negative for chills, fever and night sweats.    Hematologic/Lymphatic: Negative for bleeding problem. Does not bruise/bleed easily.   Skin: Negative for dry skin, itching and rash.   Musculoskeletal: Negative for falls.  Negative for left shoulder pain and muscle weakness.     All other review of symptoms were reviewed and found to be noncontributory.    PHYSICAL EXAMINATION:  Vitals:  /77   Pulse 74   Ht 5' 8" (1.727 m)   Wt 84 kg (185 lb 3 oz)   BMI 28.16 kg/m²    General: Well-developed well-nourished 74 y.o. malein no acute distress   Cardiovascular: Regular rhythm by palpation of distal pulse, normal color and temperature, no concerning varicosities on symptomatic side   Lungs: No labored breathing or wheezing appreciated   Neuro: Alert and oriented ×3   Psychiatric: well oriented to person, place and time, demonstrates normal mood and affect   Skin: No rashes, lesions or ulcers, normal temperature, turgor, and texture on uninvolved extremity    Ortho/SPM Exam  Examination of the left shoulder demonstrates well-healed incisions.  No biceps deformity.  Intact overhead active range of motion with symmetry to the other side.  Internal rotation to T8.  Good scapular mechanics.  No winging.  5- out of 5 resisted scaption and external rotation with arm at side.  No pain on resisted cuff testing. Motor and sensory intact to the left hand.    IMAGING:  None    ASSESSMENT:      ICD-10-CM ICD-9-CM   1. Weakness of left shoulder  R29.898 " 781.99     PLAN:     Clinically the patient looks quite good.  Discussed no strenuous lifting of furniture or heavy items around the house for another few weeks but otherwise cleared for all day-to-day activity.  I expect he will continue to get better as time goes on with regards to his overall subjective strength.  Fairly good strength on resisted testing today.  No pain.  He may return to clinic as needed.    Procedures

## 2024-10-08 ENCOUNTER — OFFICE VISIT (OUTPATIENT)
Dept: SPORTS MEDICINE | Facility: CLINIC | Age: 74
End: 2024-10-08
Payer: COMMERCIAL

## 2024-10-08 VITALS
HEIGHT: 68 IN | DIASTOLIC BLOOD PRESSURE: 77 MMHG | HEART RATE: 74 BPM | BODY MASS INDEX: 28.07 KG/M2 | SYSTOLIC BLOOD PRESSURE: 114 MMHG | WEIGHT: 185.19 LBS

## 2024-10-08 DIAGNOSIS — R29.898 WEAKNESS OF LEFT SHOULDER: Primary | ICD-10-CM

## 2024-10-08 PROCEDURE — 1101F PT FALLS ASSESS-DOCD LE1/YR: CPT | Mod: CPTII,S$GLB,, | Performed by: ORTHOPAEDIC SURGERY

## 2024-10-08 PROCEDURE — 1157F ADVNC CARE PLAN IN RCRD: CPT | Mod: CPTII,S$GLB,, | Performed by: ORTHOPAEDIC SURGERY

## 2024-10-08 PROCEDURE — 3044F HG A1C LEVEL LT 7.0%: CPT | Mod: CPTII,S$GLB,, | Performed by: ORTHOPAEDIC SURGERY

## 2024-10-08 PROCEDURE — 99213 OFFICE O/P EST LOW 20 MIN: CPT | Mod: S$GLB,,, | Performed by: ORTHOPAEDIC SURGERY

## 2024-10-08 PROCEDURE — 1125F AMNT PAIN NOTED PAIN PRSNT: CPT | Mod: CPTII,S$GLB,, | Performed by: ORTHOPAEDIC SURGERY

## 2024-10-08 PROCEDURE — 99999 PR PBB SHADOW E&M-EST. PATIENT-LVL IV: CPT | Mod: PBBFAC,,, | Performed by: ORTHOPAEDIC SURGERY

## 2024-10-08 PROCEDURE — 3288F FALL RISK ASSESSMENT DOCD: CPT | Mod: CPTII,S$GLB,, | Performed by: ORTHOPAEDIC SURGERY

## 2024-10-08 PROCEDURE — 1159F MED LIST DOCD IN RCRD: CPT | Mod: CPTII,S$GLB,, | Performed by: ORTHOPAEDIC SURGERY

## 2024-10-08 PROCEDURE — 3078F DIAST BP <80 MM HG: CPT | Mod: CPTII,S$GLB,, | Performed by: ORTHOPAEDIC SURGERY

## 2024-10-08 PROCEDURE — 3008F BODY MASS INDEX DOCD: CPT | Mod: CPTII,S$GLB,, | Performed by: ORTHOPAEDIC SURGERY

## 2024-10-08 PROCEDURE — 3074F SYST BP LT 130 MM HG: CPT | Mod: CPTII,S$GLB,, | Performed by: ORTHOPAEDIC SURGERY

## 2024-10-15 ENCOUNTER — PATIENT MESSAGE (OUTPATIENT)
Dept: NEUROLOGY | Facility: CLINIC | Age: 74
End: 2024-10-15
Payer: COMMERCIAL

## 2024-10-16 ENCOUNTER — PROCEDURE VISIT (OUTPATIENT)
Dept: NEUROLOGY | Facility: CLINIC | Age: 74
End: 2024-10-16
Payer: COMMERCIAL

## 2024-10-16 VITALS
HEART RATE: 73 BPM | DIASTOLIC BLOOD PRESSURE: 67 MMHG | SYSTOLIC BLOOD PRESSURE: 104 MMHG | BODY MASS INDEX: 27.45 KG/M2 | WEIGHT: 180.56 LBS

## 2024-10-16 DIAGNOSIS — G43.709 CHRONIC MIGRAINE WITHOUT AURA WITHOUT STATUS MIGRAINOSUS, NOT INTRACTABLE: Primary | ICD-10-CM

## 2024-10-16 DIAGNOSIS — G43.909 MIGRAINE WITHOUT STATUS MIGRAINOSUS, NOT INTRACTABLE, UNSPECIFIED MIGRAINE TYPE: ICD-10-CM

## 2024-10-16 RX ORDER — BUTALBITAL, ACETAMINOPHEN AND CAFFEINE 50; 325; 40 MG/1; MG/1; MG/1
TABLET ORAL
Qty: 20 TABLET | Refills: 0 | Status: SHIPPED | OUTPATIENT
Start: 2024-10-16

## 2024-10-16 NOTE — PROCEDURES
PROCEDURE NOTE:  BOTOX was performed as an indicated therapy for intractable chronic migraine headaches given that the patient failed more than 2 headache medications    A time out was conducted just before the start of the procedure to verify the correct patient and procedure, procedure location, and all relevant critical information.     The Botox injections have achieved well over 50%  improvement in the patient's symptoms. Migraines have been reduced at least 7 days per month and the number of cumulative hours suffering with headaches has been reduced at least 100 total hours per month. Today she does have a headache indicating that the Botox has worn off. Frequency of treatment is every 12 weeks unless no response to the treatments, at which time we will discontinue the injections.    PROCEDURE PERFORMED: Botulinum toxin injection (68635)  CLINICAL INDICATION: G43.719  After risks and benefits were explained including bleeding, infection, worsening of pain, damage to the areas being injected, weakness of muscles, loss of muscle control, dysphagia if injecting the head or neck, facial droop if injecting the facial area, painful injection, allergic or other reaction to the medications being injected, and the failure of the procedure to help the problem, a signed consent was obtained.   The patient was placed in a comfortable area and the sites to be treated were identified.The area to be treated was prepped three times with alcohol and the alcohol allowed to dry. Next, a 30 gauge needle was used to inject the medication in the area to be treated.      Total Botox used: 155 Units   Unavoidable waste: 45 Units     Injection sites:    muscle bilaterally ( a total of 10 units divided into 2 sites)   Procerus muscle (5 units)   Frontalis muscle bilaterally (a total of 20 units divided into 4 sites)   Temporalis muscle bilaterally (a total of 40 units divided into 8 sites)   Occipitalis muscle bilaterally (a  total of 30 units divided into 6 sites)   Cervical paraspinal muscles (a total of 20 units divided into 4 sites)   Trapezius muscle bilaterally (a total of 30 units divided into 6 sites)   Complications: none   RTC for the next Botox injection: 12 weeks     CC: Haseeb Puentes MD Elizabeth C Vulevich, FNP-C  [facility]   [phone]

## 2024-11-09 ENCOUNTER — PATIENT MESSAGE (OUTPATIENT)
Dept: ORTHOPEDICS | Facility: CLINIC | Age: 74
End: 2024-11-09
Payer: COMMERCIAL

## 2024-11-15 ENCOUNTER — HOSPITAL ENCOUNTER (OUTPATIENT)
Dept: RADIOLOGY | Facility: HOSPITAL | Age: 74
Discharge: HOME | End: 2024-11-15
Attending: NURSE PRACTITIONER
Payer: COMMERCIAL

## 2024-11-15 ENCOUNTER — OFFICE VISIT (OUTPATIENT)
Dept: ORTHOPEDICS | Facility: CLINIC | Age: 74
End: 2024-11-15
Payer: COMMERCIAL

## 2024-11-15 VITALS — BODY MASS INDEX: 27.36 KG/M2 | WEIGHT: 180.56 LBS | HEIGHT: 68 IN

## 2024-11-15 DIAGNOSIS — M25.551 BILATERAL HIP PAIN: ICD-10-CM

## 2024-11-15 DIAGNOSIS — M25.551 RIGHT HIP PAIN: Primary | ICD-10-CM

## 2024-11-15 DIAGNOSIS — M25.552 LEFT HIP PAIN: ICD-10-CM

## 2024-11-15 DIAGNOSIS — M25.552 BILATERAL HIP PAIN: ICD-10-CM

## 2024-11-15 DIAGNOSIS — M25.551 RIGHT HIP PAIN: ICD-10-CM

## 2024-11-15 PROCEDURE — 73521 X-RAY EXAM HIPS BI 2 VIEWS: CPT | Mod: TC

## 2024-11-15 PROCEDURE — 99999 PR PBB SHADOW E&M-EST. PATIENT-LVL V: CPT | Mod: PBBFAC,,, | Performed by: NURSE PRACTITIONER

## 2024-11-15 PROCEDURE — 73521 X-RAY EXAM HIPS BI 2 VIEWS: CPT | Mod: 26,,, | Performed by: RADIOLOGY

## 2024-11-15 RX ORDER — METHYLPREDNISOLONE 4 MG/1
TABLET ORAL
Qty: 21 EACH | Refills: 0 | Status: SHIPPED | OUTPATIENT
Start: 2024-11-15 | End: 2024-12-06

## 2024-11-15 RX ORDER — KETOROLAC TROMETHAMINE 30 MG/ML
30 INJECTION, SOLUTION INTRAMUSCULAR; INTRAVENOUS
Status: COMPLETED | OUTPATIENT
Start: 2024-11-15 | End: 2024-11-15

## 2024-11-15 RX ADMIN — KETOROLAC TROMETHAMINE 30 MG: 30 INJECTION, SOLUTION INTRAMUSCULAR; INTRAVENOUS at 02:11

## 2024-11-15 NOTE — PROGRESS NOTES
CC: left hip pain      HPI:   Pt with c/o left buttock pain. The pain is aching and intermittent. It vaughn not radiate. It got worse recently after hiking in [place] last week. He has a history of left hip replacement. He denies groin pain or instability. He has an extensive history of back issues with surgery to the lumbar spine. He is ambulating without assistive device. There is not a limp.    ROS  General: denies fever and chills  Resp: no c/o sob  CVS: no c/o cp  MSK: c/o left hip/buttock pain    PE  General: AAOx3, pleasant and cooperative  Resp: respirations even and unlabored  MSK: left hip exam  [facility]  - straight leg raise  - pain with internal rotation  - pain with external rotation  - pain over the greater trochanter      Xray:  Personally interpreted by me and reviewed with the patient: lumbar spine hardware and prosthetic hip are in proper position and alignment with no loosening or deformity noted    Assessment:  Left hip/buttock pain likely related to inflammation/tendinitis    Plan:  Medrol dose pack  Toradol 30mg IM today  If no improvement, consider MRI for further evaluation of soft tissues and joint  Nsaids prn   F/u if no relief

## 2024-11-18 DIAGNOSIS — F41.9 ANXIETY: ICD-10-CM

## 2024-11-18 DIAGNOSIS — G43.909 MIGRAINE WITHOUT STATUS MIGRAINOSUS, NOT INTRACTABLE, UNSPECIFIED MIGRAINE TYPE: ICD-10-CM

## 2024-11-18 DIAGNOSIS — F33.1 MODERATE EPISODE OF RECURRENT MAJOR DEPRESSIVE DISORDER: ICD-10-CM

## 2024-11-19 RX ORDER — CELECOXIB 100 MG/1
200 CAPSULE ORAL 2 TIMES DAILY
Qty: 180 CAPSULE | Refills: 3 | Status: SHIPPED | OUTPATIENT
Start: 2024-11-19

## 2024-11-19 RX ORDER — FREMANEZUMAB-VFRM 225 MG/1.5ML
225 INJECTION SUBCUTANEOUS
Qty: 1.5 ML | Refills: 5 | Status: SHIPPED | OUTPATIENT
Start: 2024-11-19

## 2024-11-19 RX ORDER — CLONAZEPAM 0.5 MG/1
TABLET ORAL
Qty: 60 TABLET | Refills: 3 | Status: SHIPPED | OUTPATIENT
Start: 2024-11-19

## 2024-11-19 NOTE — TELEPHONE ENCOUNTER
Care Due:                  Date            Visit Type   Department     Provider  --------------------------------------------------------------------------------                                MYCHART                              ANNUAL                              CHECKUP/PHY  [facility] INTERNAL  Last Visit: 08-      [facility]       Haseeb Puentes  Next Visit: None Scheduled  None         None Found                                                            Last  Test          Frequency    Reason                     Performed    Due Date  --------------------------------------------------------------------------------    Mg Level....  12 months..  alendronate..............  Not Found    Overdue    Phosphate...  12 months..  alendronate..............  Not Found    Overdue    [employer] Embedded Care Due Messages. Reference number: [id number].   11/18/2024 7:51:42 PM CST

## 2024-11-19 NOTE — TELEPHONE ENCOUNTER
No care due was identified.  [facility] Embedded Care Due Messages. Reference number: [id number].   11/18/2024 7:52:37 PM CST

## 2024-11-19 NOTE — TELEPHONE ENCOUNTER
Refill Routing Note   Medication(s) are not appropriate for processing by [facility] for the following reason(s):        Outside of protocol    [facility] action(s):  Route             Appointments  past 12m or future 3m with PCP    Date Provider   Last Visit   8/8/2024 Haseeb Puentes MD   Next Visit   Visit date not found Haseeb Puentes MD   ED visits in past 90 days: 0        Note composed:8:45 PM 11/18/2024

## 2024-11-26 ENCOUNTER — TELEPHONE (OUTPATIENT)
Dept: ENDOSCOPY | Facility: HOSPITAL | Age: 74
End: 2024-11-26
Payer: COMMERCIAL

## 2024-11-26 ENCOUNTER — PATIENT MESSAGE (OUTPATIENT)
Dept: GASTROENTEROLOGY | Facility: CLINIC | Age: 74
End: 2024-11-26

## 2024-11-26 ENCOUNTER — OFFICE VISIT (OUTPATIENT)
Dept: GASTROENTEROLOGY | Facility: CLINIC | Age: 74
End: 2024-11-26
Payer: COMMERCIAL

## 2024-11-26 VITALS
DIASTOLIC BLOOD PRESSURE: 77 MMHG | BODY MASS INDEX: 27.33 KG/M2 | HEART RATE: 75 BPM | SYSTOLIC BLOOD PRESSURE: 128 MMHG | WEIGHT: 180.31 LBS | HEIGHT: 68 IN

## 2024-11-26 DIAGNOSIS — K21.00 GASTROESOPHAGEAL REFLUX DISEASE WITH ESOPHAGITIS WITHOUT HEMORRHAGE: ICD-10-CM

## 2024-11-26 DIAGNOSIS — Z12.11 SPECIAL SCREENING FOR MALIGNANT NEOPLASMS, COLON: Primary | ICD-10-CM

## 2024-11-26 DIAGNOSIS — R10.32 LLQ PAIN: ICD-10-CM

## 2024-11-26 DIAGNOSIS — K58.9 IRRITABLE BOWEL SYNDROME, UNSPECIFIED TYPE: ICD-10-CM

## 2024-11-26 DIAGNOSIS — R14.0 ABDOMINAL BLOATING: ICD-10-CM

## 2024-11-26 DIAGNOSIS — Z79.899 LONG-TERM CURRENT USE OF PROTON PUMP INHIBITOR THERAPY: ICD-10-CM

## 2024-11-26 DIAGNOSIS — K57.90 DIVERTICULOSIS: Primary | ICD-10-CM

## 2024-11-26 DIAGNOSIS — R10.9 ABDOMINAL CRAMPING: ICD-10-CM

## 2024-11-26 DIAGNOSIS — K21.9 GASTROESOPHAGEAL REFLUX DISEASE, UNSPECIFIED WHETHER ESOPHAGITIS PRESENT: ICD-10-CM

## 2024-11-26 DIAGNOSIS — R10.13 EPIGASTRIC PAIN: ICD-10-CM

## 2024-11-26 DIAGNOSIS — K20.90 ESOPHAGITIS: ICD-10-CM

## 2024-11-26 DIAGNOSIS — Z87.19 HISTORY OF COLONIC DIVERTICULITIS: ICD-10-CM

## 2024-11-26 DIAGNOSIS — R10.12 LUQ PAIN: ICD-10-CM

## 2024-11-26 DIAGNOSIS — R10.9 ABDOMINAL PAIN, UNSPECIFIED ABDOMINAL LOCATION: ICD-10-CM

## 2024-11-26 PROCEDURE — 99999 PR PBB SHADOW E&M-EST. PATIENT-LVL V: CPT | Mod: PBBFAC,,,

## 2024-11-26 PROCEDURE — 3078F DIAST BP <80 MM HG: CPT | Mod: CPTII,S$GLB,,

## 2024-11-26 PROCEDURE — 1126F AMNT PAIN NOTED NONE PRSNT: CPT | Mod: CPTII,S$GLB,,

## 2024-11-26 PROCEDURE — 1159F MED LIST DOCD IN RCRD: CPT | Mod: CPTII,S$GLB,,

## 2024-11-26 PROCEDURE — 3008F BODY MASS INDEX DOCD: CPT | Mod: CPTII,S$GLB,,

## 2024-11-26 PROCEDURE — 99204 OFFICE O/P NEW MOD 45 MIN: CPT | Mod: S$GLB,,,

## 2024-11-26 PROCEDURE — 1101F PT FALLS ASSESS-DOCD LE1/YR: CPT | Mod: CPTII,S$GLB,,

## 2024-11-26 PROCEDURE — 3074F SYST BP LT 130 MM HG: CPT | Mod: CPTII,S$GLB,,

## 2024-11-26 PROCEDURE — 3044F HG A1C LEVEL LT 7.0%: CPT | Mod: CPTII,S$GLB,,

## 2024-11-26 PROCEDURE — 1160F RVW MEDS BY RX/DR IN RCRD: CPT | Mod: CPTII,S$GLB,,

## 2024-11-26 PROCEDURE — 1157F ADVNC CARE PLAN IN RCRD: CPT | Mod: CPTII,S$GLB,,

## 2024-11-26 PROCEDURE — 3288F FALL RISK ASSESSMENT DOCD: CPT | Mod: CPTII,S$GLB,,

## 2024-11-26 RX ORDER — POLYETHYLENE GLYCOL 3350, SODIUM SULFATE, POTASSIUM CHLORIDE, MAGNESIUM SULFATE, AND SODIUM CHLORIDE FOR ORAL SOLUTION 178.7-7.3G
1 KIT ORAL ONCE
Qty: 2 EACH | Refills: 0 | Status: SHIPPED | OUTPATIENT
Start: 2024-11-26 | End: 2024-11-30

## 2024-11-26 RX ORDER — DICYCLOMINE HYDROCHLORIDE 10 MG/1
10 CAPSULE ORAL 3 TIMES DAILY PRN
Qty: 30 CAPSULE | Refills: 2 | Status: SHIPPED | OUTPATIENT
Start: 2024-11-26 | End: 2025-02-24

## 2024-11-26 NOTE — PROGRESS NOTES
Gastroenterology Clinic Consultation Note    Reason for Visit:  The primary encounter diagnosis was Diverticulosis. Diagnoses of Epigastric pain, Gastroesophageal reflux disease with esophagitis without hemorrhage, LUQ pain, LLQ pain, History of colonic diverticulitis, Long-term current use of proton pump inhibitor therapy, Abdominal bloating, Abdominal cramping, and Irritable bowel syndrome, unspecified type were also pertinent to this visit.    PCP:   Haseeb Puentes   No address on file      Initial HPI   This is a 74 y.o. male past medical history of diverticulitis and prostate cancer presenting for LLQ pain  He reports this pain has been going on for 6 months intermittently. He reports the pain is cramping, colicky and pressure in nature. Associated belching, bloating and cramping. The pain does not radiate. He reports the pain is worsened by artificial sugars and vegetables. He denies nausea, vomiting, weight loss. He reports episodic reflux/pyrosis based on what he eats. He has been taking Aciphex BID. He reports to taking Celebrex twice daily for pain. He reports his sister has Crohns disease and his mother had colon cancer. He has had a cholecystomy in the past. His last last colonoscopy was in 2019 showed diverticulosis and 2 polyps, 5 yr repeat recommended. EGD in 2019 showed Small hiatal hernia, LA Grade A reflux esophagitis and a few gastric polyps. Denies unintentional weight loss, fever, chills, nausea, vomiting, constipation, diarrhea,  regurgitation, hematemesis, difficulties swallowing, changes in bowel habits, changes in stool caliber, blood in stool, and melena. He has been taking Gas-x, simethicone, and pepto for symptoms.       Abdominal Pain  How Lon month   Frequency: Intermittent    Location:    Quality:  Cramping, pressure, colicky    Radiate:  No    Aggravated or Improved with bowel movement  /eating/ movement  Artificial sugars, veggies    Medications tried:      Nausea/Vomiting/Unexplained Weight Loss:  No    Pyrosis/Reflux/Dysphagia:  Episodic -    Bowel Movements:    Melena/Hematochezia: No     Symptoms:  Prostate cancer    GLP-1s:   No    NSAIDs:   Celebrex BID    Anticoagulation or Antiplatelet:  No    History of H.pylori:  No    Prior Colonoscopy:  2019   Prior Upper Endoscopy:  2019   Family h/o Crohn's   Sister    Ulcerative Colitis:    Family h/o Celiac Sprue:     Family h/o Colon Cancer:      Mother     Abdominal Surgeries:  Madhavi          ROS:  Review of Systems   Constitutional:  Negative for chills, fever, malaise/fatigue and weight loss.   Respiratory:  Negative for shortness of breath.    Cardiovascular:  Negative for chest pain.   Gastrointestinal:  Positive for abdominal pain and heartburn. Negative for blood in stool, constipation, diarrhea, melena, nausea and vomiting.   Genitourinary:  Negative for flank pain.   Neurological:  Negative for dizziness and weakness.        Medical History:  has a past medical history of Allergy, Arthritis, Back pain, Cataract, Chronic pain, Cluster headache (2013), Colon polyps (2007), Degenerative disc disease, Depression, Diverticulitis (12/2013), Dry eye syndrome, Fibromyalgia (2013), GERD (gastroesophageal reflux disease), Hepatitis (1970's), History of prostate biopsy (2002), Hyperlipidemia, Joint pain, Prostate cancer (07/2021), PVD (posterior vitreous detachment), Salzmann's nodular dystrophy of left eye, Sleep apnea, Thyroid nodule (07/16/2014), Trigeminal neuralgia of left side of face, Tubular adenoma of colon (2007), and Visual disturbance (2012).    Surgical History:  has a past surgical history that includes Knee surgery; Hemorrhoid surgery; Cholecystectomy; Sinus surgery; Eye surgery; Back surgery; Joint replacement; Total hip arthroplasty (04/2012); Cosmetic surgery (02/10/2015); Cosmetic surgery (02/10/2015); Cataract extraction w/  intraocular lens implant (Bilateral); Spinal fusion (06/22/2015);  Injection of steroid (Right, 12/06/2018); Injection of steroid (Right, 09/19/2019); Esophagogastroduodenoscopy (N/A, 12/17/2019); Colonoscopy (N/A, 12/17/2019); Cystoscopy with ureteroscopy, retrograde pyelography, and insertion of stent (Left, 08/19/2020); Fusion of lumbar spine by anterior approach (N/A, 08/20/2020); Ulnar nerve transposition (Left, 12/16/2020); Carpal tunnel release (Right, 05/18/2021); Colonoscopy (2007); Repair of extensor tendon (Left, 04/07/2022); Irrigation and debridement of upper extremity (Left, 04/07/2022); Arthroscopic debridement of shoulder (Left, 04/19/2024); Arthroscopic repair of rotator cuff of shoulder (Left, 04/19/2024); arthroscopy,shoulder,with biceps tenodesis (Left, 04/19/2024); Arthroscopy of shoulder with decompression of subacromial space (Left, 04/19/2024); and Shoulder surgery.    Family History: family history includes Arthritis in his sister; Cancer in his maternal cousin; Colon cancer in his mother; Crohn's disease in his sister; Diabetes in his brother; Heart attack in his maternal grandfather; Irritable bowel syndrome in his son; Jaundice in his grandchild; Macular degeneration in his brother; Migraines in his sister; Neurological Disorders in his son; Other in his brother, maternal uncle, and maternal uncle; Other (age of onset: 44) in his father; Pancreatitis in his brother; Stroke (age of onset: 86) in his mother; Tremor in his daughter and son; Uterine cancer (age of onset: 77) in his mother..       Review of patient's allergies indicates:   Allergen Reactions    Alphagan [brimonidine]      Patient taking MASO-B Selective Inhibitor Selegiline (Emsam)    Coumadin [warfarin]      itch    Oxycodone      hiccups       Current Outpatient Medications on File Prior to Visit   Medication Sig Dispense Refill    alendronate (FOSAMAX) 70 MG tablet Take 1 tablet (70 mg total) by mouth every 7 days. Take with a full glass of water weekly. 4 tablet 11    atorvastatin  (LIPITOR) 40 MG tablet Take 1 tablet (40 mg total) by mouth once daily. 90 tablet 1    celecoxib (CELEBREX) 100 MG capsule Take 2 capsules (200 mg total) by mouth 2 (two) times daily. 180 capsule 3    clindamycin (CLEOCIN) 150 MG capsule Take 4 capsules (600 mg total) by mouth 1 hour prior to dental appointment. 12 capsule 1    clonazePAM (KLONOPIN) 0.5 MG tablet Take 1 tablet by mouth twice a day as needed for anxiety 60 tablet 3    cycloSPORINE (VEVYE) 0.1 % Drop Place 1 drop into both eyes 2 (two) times a day. 2 mL 11    ergocalciferol (VITAMIN D2) 50,000 unit Cap Take 1 capsule (50,000 Units total) by mouth every 7 days. 12 capsule 3    fremanezumab-vfrm (AJOVY SYRINGE) 225 mg/1.5 mL injection Inject 1.5 mLs (225 mg total) into the skin every 28 days. 1.5 mL 5    HYDROcodone-acetaminophen (NORCO) 5-325 mg per tablet Take 1 tablet by mouth every 8 (eight) hours as needed for Pain. 90 tablet 0    hydrocortisone-pramoxine (ANALPRAM-HC) 2.5-1 % Crea Place rectally 3 (three) times daily. 30 g 2    ketoconazole (NIZORAL) 2 % cream Apply to affected areas of body folds twice daily as needed for irritation. 60 g 5    lamoTRIgine (LAMICTAL) 200 MG tablet Take 1 tablet (200 mg total) by mouth once daily. 90 tablet 3    methocarbamoL (ROBAXIN) 750 MG Tab Take 1 tablet (750 mg total) by mouth 3 (three) times daily. 60 tablet 2    perfluorohexyloctane, PF, 100 % Drop Place 1 drop into both eyes 4 (four) times daily. 3 mL 11    pregabalin (LYRICA) 25 MG capsule Take 2 capsules (50 mg total) by mouth once daily. 90 capsule 1    RABEprazole (ACIPHEX) 20 mg tablet Take 1 tablet (20 mg total) by mouth 2 (two) times daily. 180 tablet 3    selegiline (EMSAM) 12 mg/24 hr Place 1 patch onto the skin once daily. 30 patch 11    traZODone (DESYREL) 100 MG tablet Take 1 tablet (100 mg total) by mouth every evening. 90 tablet 6    triamcinolone acetonide 0.025% (KENALOG) 0.025 % cream Apply to affected areas of body folds twice daily as  "needed for irritation. Do not use for longer than 2 weeks in a row. 30 g 2    ubrogepant (UBRELVY) 100 mg tablet Take 1 tablet (100 mg total) by mouth every 2 (two) hours as needed for Migraine. Max 200 mg/day. 16 tablet 5    HYDROcodone-acetaminophen (NORCO) 5-325 mg per tablet Take 1 tablet by mouth 4 (four) times daily as needed. 120 tablet 0    methylPREDNISolone (MEDROL DOSEPACK) 4 mg tablet use as directed 21 each 0    pregabalin (LYRICA) 25 MG capsule Take 1 capsule (25 mg total) by mouth 2 (two) times a day. 180 capsule 0    pregabalin (LYRICA) 25 MG capsule Take 1 capsule (25 mg total) by mouth 2 (two) times a day. 180 capsule 0    pregabalin (LYRICA) 75 MG capsule Take 3 capsules by mouth at night 90 capsule 1    pregabalin (LYRICA) 75 MG capsule Take 3 capsules (225 mg total) by mouth every evening. 270 capsule 0    pregabalin (LYRICA) 75 MG capsule Take 3 capsules (225 mg total) by mouth nightly. 270 capsule 0    XIIDRA 5 % Dpet Apply 1 drop to eye 2 (two) times a day. 60 each 11     Current Facility-Administered Medications on File Prior to Visit   Medication Dose Route Frequency Provider Last Rate Last Admin    onabotulinumtoxina injection 200 Units  200 Units Intramuscular q12 weeks Macie Pearson FNP   200 Units at 10/16/24 1313         Objective Findings:    Vital Signs:  /77   Pulse 75   Ht 5' 8" (1.727 m)   Wt 81.8 kg (180 lb 5.4 oz)   BMI 27.42 kg/m²   Body mass index is 27.42 kg/m².    Physical Exam:  Physical Exam  Vitals and nursing note reviewed.   Constitutional:       General: He is not in acute distress.     Appearance: Normal appearance. He is not ill-appearing.   HENT:      Head: Normocephalic and atraumatic.      Right Ear: External ear normal.      Left Ear: External ear normal.      Nose: Nose normal.   Eyes:      General: No scleral icterus.     Extraocular Movements: Extraocular movements intact.   Cardiovascular:      Rate and Rhythm: Normal rate.   Pulmonary:      " Effort: Pulmonary effort is normal. No respiratory distress.   Abdominal:      General: There is no distension.      Palpations: Abdomen is soft.      Tenderness: There is no guarding.   Musculoskeletal:         General: Normal range of motion.      Cervical back: Normal range of motion.   Skin:     General: Skin is warm and dry.   Neurological:      Mental Status: He is alert and oriented to person, place, and time.   Psychiatric:         Mood and Affect: Mood normal.         Behavior: Behavior normal.         Thought Content: Thought content normal.       Labs:  Lab Results   Component Value Date    WBC 6.41 08/14/2024    HGB 14.8 08/14/2024    HCT 46.8 08/14/2024     08/14/2024    CRP 0.7 03/25/2022    CHOL 195 08/14/2024    TRIG 125 08/14/2024    HDL 60 08/14/2024    ALKPHOS 36 (L) 08/14/2024    LIPASE 15 12/17/2018    ALT 18 08/14/2024    AST 19 08/14/2024     08/14/2024    K 4.3 08/14/2024     08/14/2024    CREATININE 0.8 08/14/2024    BUN 13 08/14/2024    CO2 23 08/14/2024    TSH 0.869 08/14/2024    PSA 6.3 (H) 10/31/2019    INR 1.0 08/03/2020    HGBA1C 5.5 08/14/2024       Imaging reviewed: Dexa Scan 08/09/24    Impression:     *Osteoporosis based on T-score between -1.0 and -2.5 and fragility fracture of distal forearm.  *Fracture risk is high.     RECOMMENDATIONS:  *Daily calcium intake 8916-1845 mg, dietary sources preferred; Vitamin D 2127-1310 IU daily.  *Weight bearing exercise and fall precautions.  *Recommend medical therapy for osteoporosis with high risk for fracture if patient has not received adequate therapy. Consider bisphosphonates (alendronate, risedronate, zoledronic acid), or denosumab.  *Repeat BMD in 2 years.        Electronically signed by:Lucinda Weiss MD  Date:                                            08/09/2024  Time:                                           06:56    Endoscopy reviewed: Colonoscopy 12/17/2019    Findings:       Multiple small-mouthed  diverticula were found in the sigmoid colon.        A 2 mm polyp was found in the hepatic flexure. The polyp was        sessile. The polyp was removed with a cold biopsy forceps. Resection        and retrieval were complete.   Impression:           - Diverticulosis in the sigmoid colon.                         - One 2 mm polyp at the hepatic flexure, removed                         with a cold biopsy forceps. Resected and retrieved.   Recommendation:       - Patient has a contact number available for                         emergencies. The signs and symptoms of potential                         delayed complications were discussed with the                         patient. Return to normal activities tomorrow.                         Written discharge instructions were provided to the                         patient.                         - Discharge patient to home (ambulatory).                         - Resume previous diet.                         - Continue present medications.                         - Await pathology results.                         - Repeat colonoscopy in 5 years for surveillance.   Attending Participation:        I personally performed the entire procedure.   Amadou Hardin MD   12/17/2019 8:13:44 AM     EGD 12/17/2019    Findings:       A small hiatal hernia was present.        LA Grade A (one or more mucosal breaks less than 5 mm, not extending        between tops of 2 mucosal folds) esophagitis with no bleeding was        found 38 cm from the incisors. Biopsies were taken with a cold        forceps for histology.        A few 5 to 8 mm sessile polyps were found in the gastric body.        typical of fundic gland polyps        The examined duodenum was normal.   Impression:           - Small hiatal hernia.                         - LA Grade A reflux esophagitis. Biopsied.                         - A few gastric polyps.                         - Normal examined duodenum.   Recommendation:        - Patient has a contact number available for                         emergencies. The signs and symptoms of potential                         delayed complications were discussed with the                         patient. Return to normal activities tomorrow.                         Written discharge instructions were provided to the                         patient.                         - Discharge patient to home (ambulatory).                         - Resume previous diet.                         - Continue present medications.                         - Await pathology results.                         - Return to primary care physician as previously                         scheduled.   Attending Participation:        I personally performed the entire procedure.   Amadou Hardin MD   12/17/2019 7:41:09 AM     1.  BIOPSY OF ESOPHAGUS AT 38 CM:   SQUAMOUS MUCOSAL LINED TISSUE WITH MILD NONSPECIFIC SUBMUCOSAL CHRONIC   INFLAMMATION   NO EOSINOPHILIC INFILTRATE OF THE MUCOSA AS MIGHT BE SEEN WITH REFLUX     2.  BIOPSY OF SPLENIC FLEXURE:   BENIGN NONNEOPLASTIC COLONIC MUCOSA WITH NO SIGNIFICANT HISTOLOGIC ALTERATION       Assessment:  1. Diverticulosis    2. Epigastric pain    3. Gastroesophageal reflux disease with esophagitis without hemorrhage    4. LUQ pain    5. LLQ pain    6. History of colonic diverticulitis    7. Long-term current use of proton pump inhibitor therapy    8. Abdominal bloating    9. Abdominal cramping    10. Irritable bowel syndrome, unspecified type      Orders Placed This Encounter    Hydrogen Breath test (HBT) - SIBO (small intestinal bacteria overgrowth) with Lactulose    Ambulatory referral/consult to Nutrition Services    dicyclomine (BENTYL) 10 MG capsule       Plan:  EGD/Colonoscopy to assess current GI complaints/ colon screening due   2.   SIBO breath test long term PPI use   3.   Bentyl for abdominal cramping/discomfort   4.  Referral to dietitian - suspicion for IBS based on  symptomology surrounding certain foods   5.  Continue PPI as scheduled  RTC based on pathology from scopes     Thank you for allowing me to participate in this patient's care.    Sincerely,     JUWAN ZAMUDIO, ROSARIOP-C  Gastroenterology Department  [facility] Office [phone]

## 2024-11-26 NOTE — TELEPHONE ENCOUNTER
Referral for procedure from Telephone call - direct access patient      Spoke to patient to schedule procedure(s) Colonoscopy/EGD       Physician to perform procedure(s) Dr. LI Dejesus  Date of Procedure (s) 01/10/25  Arrival Time 10:00 AM  Time of Procedure(s) 11:00 AM   Location of Procedure(s) [facility]  Type of Rx Prep sent to patient: [facility]  Instructions provided to patient via Copy in hand    Patient was informed on the following information and verbalized understanding. Screening questionnaire reviewed with patient and complete. If procedure requires anesthesia, a responsible adult needs to be present to accompany the patient home, patient cannot drive after receiving anesthesia. Appointment details are tentative, especially check-in time. Patient will receive a prep-op call 7 days prior to confirm check-in time for procedure. If applicable the patient should contact their pharmacy to verify Rx for procedure prep is ready for pick-up. Patient was advised to call the scheduling department at [phone] if pharmacy states no Rx is available. Patient was advised to call the endoscopy scheduling department if any questions or concerns arise.      [facility] Scheduling Department

## 2024-11-26 NOTE — TELEPHONE ENCOUNTER
"Debbi Chilel, GARO  [facility] Patients  Caller: Unspecified (Today,  1:16 PM)  Procedure: EGD/Colonoscopy    Diagnosis: Abdominal pain, GERD, and Esophagitis    Procedure Timin-12 weeks    *If within 4 weeks selected, please flaca as high priority*    *If greater than 12 weeks, please select "5-12 weeks" and delay sending until 3 months prior to requested date*    Location: Any Site    Additional Scheduling Information: No scheduling concerns    Prep Specifications:Standard prep    Is the patient taking a GLP-1 Agonist:no    Have you attached a patient to this message: yes  "

## 2024-11-29 ENCOUNTER — PATIENT MESSAGE (OUTPATIENT)
Dept: GASTROENTEROLOGY | Facility: CLINIC | Age: 74
End: 2024-11-29
Payer: COMMERCIAL

## 2024-12-02 ENCOUNTER — TELEPHONE (OUTPATIENT)
Dept: ENDOSCOPY | Facility: HOSPITAL | Age: 74
End: 2024-12-02
Payer: COMMERCIAL

## 2024-12-02 DIAGNOSIS — G43.909 MIGRAINE WITHOUT STATUS MIGRAINOSUS, NOT INTRACTABLE, UNSPECIFIED MIGRAINE TYPE: ICD-10-CM

## 2024-12-02 RX ORDER — BUTALBITAL, ACETAMINOPHEN AND CAFFEINE 50; 325; 40 MG/1; MG/1; MG/1
1 TABLET ORAL DAILY PRN
Qty: 14 TABLET | Refills: 0 | Status: SHIPPED | OUTPATIENT
Start: 2024-12-02

## 2024-12-02 NOTE — TELEPHONE ENCOUNTER
"Debbi Chilel, GARO  [facility] Patients  Caller: Unspecified (Today,  1:16 PM)  Procedure: EGD/Colonoscopy    Diagnosis: Abdominal pain, GERD, and Esophagitis    Procedure Timin-12 weeks    *If within 4 weeks selected, please flaca as high priority*    *If greater than 12 weeks, please select "5-12 weeks" and delay sending until 3 months prior to requested date*    Location: Any Site    Additional Scheduling Information: No scheduling concerns    Prep Specifications:Standard prep    Is the patient taking a GLP-1 Agonist:no    Have you attached a patient to this message: yes     "

## 2024-12-08 DIAGNOSIS — K64.9 HEMORRHOIDS, UNSPECIFIED HEMORRHOID TYPE: ICD-10-CM

## 2024-12-08 RX ORDER — HYDROCORTISONE ACETATE PRAMOXINE HCL 2.5; 1 G/100G; G/100G
CREAM TOPICAL 3 TIMES DAILY
Qty: 30 G | Refills: 2 | Status: CANCELLED | OUTPATIENT
Start: 2024-12-08

## 2024-12-09 RX ORDER — HYDROCORTISONE ACETATE PRAMOXINE HCL 2.5; 1 G/100G; G/100G
CREAM TOPICAL 3 TIMES DAILY
Qty: 30 G | Refills: 2 | Status: SHIPPED | OUTPATIENT
Start: 2024-12-09

## 2024-12-12 ENCOUNTER — OFFICE VISIT (OUTPATIENT)
Dept: OTOLARYNGOLOGY | Facility: CLINIC | Age: 74
End: 2024-12-12
Payer: COMMERCIAL

## 2024-12-12 DIAGNOSIS — H61.23 BILATERAL IMPACTED CERUMEN: Primary | ICD-10-CM

## 2024-12-12 PROCEDURE — 99999 PR PBB SHADOW E&M-EST. PATIENT-LVL III: CPT | Mod: PBBFAC,,, | Performed by: NURSE PRACTITIONER

## 2024-12-12 NOTE — PROCEDURES
Ear Cerumen Removal    Date/Time: 12/12/2024 11:00 AM    Performed by: Cheyenne Li NP  Authorized by: Cheyenne Li NP      Local anesthetic:  None  Location details:  Both ears  Procedure type: curette    Cerumen  Removal Results:  Cerumen completely removed  Patient tolerance:  Patient tolerated the procedure well with no immediate complications     Procedure Note:    The patient was brought to the minor procedure room and placed under the operating microscope of the right ear canal which was cleaned of ceruminous debris. Using a combination of suction, curettes and cup forceps the patient's cerumen was removed. The patient tolerated the procedure well. There were no complications.    Procedure Note:    The patient was brought to the minor procedure room and placed under the operating microscope of the left ear canal which was cleaned of ceruminous debris. Using a combination of suction, curettes and cup forceps the patient's cerumen was removed.  The patient tolerated the procedure well. There were no complications.      Routine ear cleaning. RTC in 4 months.

## 2024-12-23 ENCOUNTER — ANESTHESIA EVENT (OUTPATIENT)
Dept: ENDOSCOPY | Facility: HOSPITAL | Age: 74
End: 2024-12-23
Payer: MEDICARE

## 2024-12-23 NOTE — ANESTHESIA PREPROCEDURE EVALUATION
[facility]  Anesthesia Pre-Operative Evaluation       Patient Name: Raffy Rutherford Jr.  YOB: 1950  MRN: [id number]  [facility]: [id number]      Code Status: Prior   Date of Procedure: 1/7/2025  Anesthesia: Choice Procedure: Procedure(s) (LRB):  EGD (ESOPHAGOGASTRODUODENOSCOPY) (N/A)  COLONOSCOPY (N/A)  Pre-Operative Diagnosis: Abdominal pain, unspecified abdominal location [R10.9]  Gastroesophageal reflux disease, unspecified whether esophagitis present [K21.9]  Esophagitis [K20.90]  Proceduralist: Surgeons and Role:     * Jason Dejesus MD - Primary Nurse: (Unknown)      SUBJECTIVE:   Raffy Rutherford Jr. is a 74 y.o. male who  has a past medical history of Allergy, Arthritis, Back pain, Cataract, Chronic pain, Cluster headache (2013), Colon polyps (2007), Degenerative disc disease, Depression, Diverticulitis (12/2013), Dry eye syndrome, Fibromyalgia (2013), GERD (gastroesophageal reflux disease), Hepatitis (1970's), History of prostate biopsy (2002), Hyperlipidemia, Joint pain, Prostate cancer (07/2021), PVD (posterior vitreous detachment), Salzmann's nodular dystrophy of left eye, Sleep apnea, Thyroid nodule (07/16/2014), Trigeminal neuralgia of left side of face, Tubular adenoma of colon (2007), and Visual disturbance (2012)..     he has a current medication list which includes the following long-term medication(s): alendronate, atorvastatin, clonazepam, ketoconazole, lamotrigine, pregabalin, pregabalin, pregabalin, rabeprazole, emsam, trazodone, and triamcinolone acetonide 0.025%.     ALLERGIES:     Review of patient's allergies indicates:   Allergen Reactions    Alphagan [brimonidine]      Patient taking MASO-B Selective Inhibitor Selegiline (Emsam)    Coumadin [warfarin]      itch    Oxycodone      hiccups     LDA:          Lines/Drains/Airways       None                  Anesthesia Evaluation      Airway   Dental      Pulmonary    (+) sleep apnea  Cardiovascular   Exercise  tolerance: poor    Neuro/Psych    (+) neuromuscular disease, headaches, psychiatric history    GI/Hepatic/Renal    (+) GERD, hepatitis, liver disease    Endo/Other    (+) arthritis  Abdominal                   MEDICATIONS:     Antibiotics (From admission, onward)      None          VTE Risk Mitigation (From admission, onward)      None              Current Facility-Administered Medications   Medication Dose Route Frequency Provider Last Rate Last Admin    onabotulinumtoxina injection 200 Units  200 Units Intramuscular q12 weeks Ruel PearsonMIKAYLA argueta   200 Units at 10/16/24 1313     Current Outpatient Medications   Medication Sig Dispense Refill    alendronate (FOSAMAX) 70 MG tablet Take 1 tablet (70 mg total) by mouth every 7 days. Take with a full glass of water weekly. 4 tablet 11    atorvastatin (LIPITOR) 40 MG tablet Take 1 tablet (40 mg total) by mouth once daily. 90 tablet 1    butalbital-acetaminophen-caffeine -40 mg (FIORICET, ESGIC) -40 mg per tablet Take 1 tablet by mouth daily as needed for 30 days. 14 tablet 0    celecoxib (CELEBREX) 100 MG capsule Take 2 capsules (200 mg total) by mouth 2 (two) times daily. 180 capsule 3    clindamycin (CLEOCIN) 150 MG capsule Take 4 capsules (600 mg total) by mouth 1 hour prior to dental appointment. 12 capsule 1    clonazePAM (KLONOPIN) 0.5 MG tablet Take 1 tablet by mouth twice a day as needed for anxiety 60 tablet 3    cycloSPORINE (VEVYE) 0.1 % Drop Place 1 drop into both eyes 2 (two) times a day. 2 mL 11    dicyclomine (BENTYL) 10 MG capsule Take 1 capsule (10 mg total) by mouth 3 (three) times daily as needed (abdominal cramping). 30 capsule 2    ergocalciferol (VITAMIN D2) 50,000 unit Cap Take 1 capsule (50,000 Units total) by mouth every 7 days. 12 capsule 3    fremanezumab-vfrm (AJOVY SYRINGE) 225 mg/1.5 mL injection Inject 1.5 mLs (225 mg total) into the skin every 28 days. 1.5 mL 5    HYDROcodone-acetaminophen (NORCO) 5-325 mg per  tablet Take 1 tablet by mouth every 8 (eight) hours as needed for Pain. 90 tablet 0    HYDROcodone-acetaminophen (NORCO) 7.5-325 mg per tablet Take 1 tablet by mouth 4 times a day as needed for chronic pain 120 tablet 0    hydrocortisone-pramoxine (ANALPRAM-HC) 2.5-1 % Crea Place rectally 3 (three) times daily. 30 g 2    ketoconazole (NIZORAL) 2 % cream Apply to affected areas of body folds twice daily as needed for irritation. 60 g 5    lamoTRIgine (LAMICTAL) 200 MG tablet Take 1 tablet (200 mg total) by mouth once daily. 90 tablet 3    methocarbamoL (ROBAXIN) 750 MG Tab Take 1 tablet (750 mg total) by mouth 3 (three) times daily. 60 tablet 2    perfluorohexyloctane, PF, 100 % Drop Place 1 drop into both eyes 4 (four) times daily. 3 mL 11    pregabalin (LYRICA) 25 MG capsule Take 2 capsules (50 mg total) by mouth once daily. 90 capsule 1    pregabalin (LYRICA) 25 MG capsule Take 1 capsule (25 mg total) by mouth 4 (four) times daily. 360 capsule 0    pregabalin (LYRICA) 75 MG capsule Take 3 capsules (225 mg total) by mouth daily at night 270 capsule 0    RABEprazole (ACIPHEX) 20 mg tablet Take 1 tablet (20 mg total) by mouth 2 (two) times daily. 180 tablet 3    selegiline (EMSAM) 12 mg/24 hr Place 1 patch onto the skin once daily. 30 patch 11    traZODone (DESYREL) 100 MG tablet Take 1 tablet (100 mg total) by mouth every evening. 90 tablet 6    triamcinolone acetonide 0.025% (KENALOG) 0.025 % cream Apply to affected areas of body folds twice daily as needed for irritation. Do not use for longer than 2 weeks in a row. 30 g 2    ubrogepant (UBRELVY) 100 mg tablet Take 1 tablet (100 mg total) by mouth every 2 (two) hours as needed for Migraine. Max 200 mg/day. 16 tablet 5          History:   There are no hospital problems to display for this patient.    Surgical History:    has a past surgical history that includes Knee surgery; Hemorrhoid surgery; Cholecystectomy; Sinus surgery; Eye surgery; Back  surgery; Joint replacement; Total hip arthroplasty (04/2012); Cosmetic surgery (02/10/2015); Cosmetic surgery (02/10/2015); Cataract extraction w/  intraocular lens implant (Bilateral); Spinal fusion (06/22/2015); Injection of steroid (Right, 12/06/2018); Injection of steroid (Right, 09/19/2019); Esophagogastroduodenoscopy (N/A, 12/17/2019); Colonoscopy (N/A, 12/17/2019); Cystoscopy with ureteroscopy, retrograde pyelography, and insertion of stent (Left, 08/19/2020); Fusion of lumbar spine by anterior approach (N/A, 08/20/2020); Ulnar nerve transposition (Left, 12/16/2020); Carpal tunnel release (Right, 05/18/2021); Colonoscopy (2007); Repair of extensor tendon (Left, 04/07/2022); Irrigation and debridement of upper extremity (Left, 04/07/2022); Arthroscopic debridement of shoulder (Left, 04/19/2024); Arthroscopic repair of rotator cuff of shoulder (Left, 04/19/2024); arthroscopy,shoulder,with biceps tenodesis (Left, 04/19/2024); Arthroscopy of shoulder with decompression of subacromial space (Left, 04/19/2024); and Shoulder surgery.   Social History:    reports being sexually active and has had partner(s) who are female.  reports that he has never smoked. He has never used smokeless tobacco. He reports current alcohol use of about 5.0 standard drinks of alcohol per week. He reports that he does not use drugs.     OBJECTIVE:     Vital Signs (Most Recent):    Vital Signs Range (Last 24H):  BP: ()/()   Arterial Line BP: ()/()        There is no height or weight on file to calculate BMI.   Wt Readings from Last 4 Encounters:   11/26/24 81.8 kg (180 lb 5.4 oz)   11/15/24 81.9 kg (180 lb 8.9 oz)   10/16/24 81.9 kg (180 lb 8.9 oz)   10/08/24 84 kg (185 lb 3 oz)       Significant Labs:  Lab Results   Component Value Date    WBC 6.41 08/14/2024    HGB 14.8 08/14/2024    HCT 46.8 08/14/2024     08/14/2024     08/14/2024    K 4.3 08/14/2024     08/14/2024    CREATININE 0.8 08/14/2024    BUN 13 08/14/2024     "CO2 23 08/14/2024    GLU 96 08/14/2024    CALCIUM 9.3 08/14/2024    MG 2.4 02/25/2019    ALKPHOS 36 (L) 08/14/2024    ALT 18 08/14/2024    AST 19 08/14/2024    ALBUMIN 4.0 08/14/2024    INR 1.0 08/03/2020    APTT 29.1 08/03/2020    HGBA1C 5.5 08/14/2024     03/25/2022     No LMP for male patient.  No results found for this or any previous visit (from the past 72 hours).    EKG:   Results for orders placed or performed during the hospital encounter of 04/05/24   EKG 12-lead    Collection Time: 04/05/24  1:34 PM   Result Value Ref Range    QRS Duration 144 ms    [facility] QTC Calculation 413 ms    Narrative    Test Reason : Z01.810,    Vent. Rate : 066 BPM     Atrial Rate : 066 BPM     P-R Int : 156 ms          QRS Dur : 144 ms      QT Int : 394 ms       P-R-T Axes : 049 -64 029 degrees     QTc Int : 413 ms    Normal sinus rhythm  Left axis deviation  Right bundle branch block  Abnormal ECG  When compared with ECG of 03-AUG-2020 11:51,  Right bundle branch block is now Present  Confirmed by MARINA CASTELLON MD ([id number]) on 4/5/2024 4:06:01 PM    Referred By: FABI ELLIOTT           Confirmed By:MARINA CASTELLON MD       TTE:  No results found. However, due to the size of the patient record, not all encounters were searched. Please check Results Review for a complete set of results.  No results found for: "EF"   No results found. However, due to the size of the patient record, not all encounters were searched. Please check Results Review for a complete set of results.  NOEL:  No results found. However, due to the size of the patient record, not all encounters were searched. Please check Results Review for a complete set of results.  Stress Test:  No results found for this or any previous visit.     LHC:  No results found for this or any previous visit.     PFT:  No results found for: "FEV1", "FVC", "NRJ3BEN", "TLC", "DLCO"     ASSESSMENT/PLAN:                                                                                    "                               12/23/2024  Raffy Rutherford Jr. is a 74 y.o., male.      Pre-op Assessment    I have reviewed the Patient Summary Reports.    I have reviewed the NPO Status.   I have reviewed the Medications.     Review of Systems  Anesthesia Hx:  No problems with previous Anesthesia             Denies Family Hx of Anesthesia complications.    Denies Personal Hx of Anesthesia complications.                    Hematology/Oncology:  Hematology Normal                       --  Cancer in past history:       Other (see Oncology comments)          Oncology Comments: prostate     EENT/Dental:  EENT/Dental Normal           Cardiovascular:  Cardiovascular Normal Exercise tolerance: poor                                             Pulmonary:        Sleep Apnea                Renal/:  Renal/ Normal                 Hepatic/GI:     GERD Liver Disease, Hepatitis              Musculoskeletal:  Arthritis               Neurological:    Neuromuscular Disease,  Headaches                                 Endocrine:  Endocrine Normal            Dermatological:  Skin Normal    Psych:  Psychiatric History                Physical Exam  General: Well nourished, Cooperative, Alert and Oriented    Chest/Lungs:  Normal Respiratory Rate      Anesthesia Plan  Type of Anesthesia, risks & benefits discussed:    Anesthesia Type: Gen Natural Airway  Intra-op Monitoring Plan: Standard ASA Monitors  Post Op Pain Control Plan: multimodal analgesia  Induction:  IV  Informed Consent: Informed consent signed with the Patient and all parties understand the risks and agree with anesthesia plan.  All questions answered.   ASA Score: 3  Day of Surgery Review of History & Physical: H&P Update referred to the surgeon/provider.I have interviewed and examined the patient. I have reviewed the patient's H&P dated:     Ready For Surgery From Anesthesia Perspective.     .

## 2024-12-27 ENCOUNTER — PATIENT MESSAGE (OUTPATIENT)
Dept: NEUROLOGY | Facility: CLINIC | Age: 74
End: 2024-12-27
Payer: MEDICARE

## 2024-12-27 DIAGNOSIS — L21.9 SEBORRHEIC DERMATITIS: ICD-10-CM

## 2024-12-27 DIAGNOSIS — K21.00 GASTROESOPHAGEAL REFLUX DISEASE WITH ESOPHAGITIS WITHOUT HEMORRHAGE: ICD-10-CM

## 2024-12-27 DIAGNOSIS — G43.909 MIGRAINE WITHOUT STATUS MIGRAINOSUS, NOT INTRACTABLE, UNSPECIFIED MIGRAINE TYPE: ICD-10-CM

## 2024-12-27 RX ORDER — TRIAMCINOLONE ACETONIDE 0.25 MG/G
CREAM TOPICAL
Qty: 30 G | Refills: 2 | Status: CANCELLED | OUTPATIENT
Start: 2024-12-27

## 2024-12-27 RX ORDER — BUTALBITAL, ACETAMINOPHEN AND CAFFEINE 50; 325; 40 MG/1; MG/1; MG/1
1 TABLET ORAL DAILY PRN
Qty: 14 TABLET | Refills: 0 | Status: CANCELLED | OUTPATIENT
Start: 2024-12-27

## 2024-12-27 RX ORDER — PERFLUOROHEXYLOCTANE 1 MG/MG
1 SOLUTION OPHTHALMIC 4 TIMES DAILY
Qty: 3 ML | Refills: 11 | Status: SHIPPED | OUTPATIENT
Start: 2024-12-27

## 2024-12-27 RX ORDER — RABEPRAZOLE SODIUM 20 MG/1
20 TABLET, DELAYED RELEASE ORAL 2 TIMES DAILY
Qty: 180 TABLET | Refills: 3 | Status: CANCELLED | OUTPATIENT
Start: 2024-12-27

## 2024-12-27 RX ORDER — METHOCARBAMOL 750 MG/1
750 TABLET, FILM COATED ORAL 3 TIMES DAILY
Qty: 60 TABLET | Refills: 2 | Status: CANCELLED | OUTPATIENT
Start: 2024-12-27

## 2024-12-27 NOTE — TELEPHONE ENCOUNTER
No care due was identified.  [facility] Embedded Care Due Messages. Reference number: [id number].   12/27/2024 12:56:28 PM CST

## 2024-12-27 NOTE — TELEPHONE ENCOUNTER
No care due was identified.  [facility] Embedded Care Due Messages. Reference number: [id number].   12/27/2024 11:49:17 AM CST

## 2024-12-28 RX ORDER — BUTALBITAL, ACETAMINOPHEN AND CAFFEINE 50; 325; 40 MG/1; MG/1; MG/1
1 TABLET ORAL DAILY PRN
Qty: 14 TABLET | Refills: 0 | Status: SHIPPED | OUTPATIENT
Start: 2024-12-28

## 2024-12-28 RX ORDER — METHOCARBAMOL 750 MG/1
750 TABLET, FILM COATED ORAL 3 TIMES DAILY
Qty: 60 TABLET | Refills: 2 | Status: SHIPPED | OUTPATIENT
Start: 2024-12-28

## 2024-12-28 NOTE — TELEPHONE ENCOUNTER
Refill Routing Note   Medication(s) are not appropriate for processing by [facility] for the following reason(s):        Outside of protocol: total daily dose outside of [facility] protocol (20 mg daily max)     [facility] action(s):  Route             Appointments  past 12m or future 3m with PCP    Date Provider   Last Visit   8/8/2024 Haseeb Puentes MD   Next Visit   Visit date not found Haseeb Puentes MD   ED visits in past 90 days: 0        Note composed:7:00 PM 12/27/2024

## 2024-12-29 RX ORDER — RABEPRAZOLE SODIUM 20 MG/1
20 TABLET, DELAYED RELEASE ORAL 2 TIMES DAILY
Qty: 180 TABLET | Refills: 1 | Status: SHIPPED | OUTPATIENT
Start: 2024-12-29

## 2024-12-30 DIAGNOSIS — L21.9 SEBORRHEIC DERMATITIS: ICD-10-CM

## 2024-12-30 RX ORDER — TRIAMCINOLONE ACETONIDE 0.25 MG/G
CREAM TOPICAL
Qty: 30 G | Refills: 2 | OUTPATIENT
Start: 2024-12-30

## 2025-01-06 ENCOUNTER — TELEPHONE (OUTPATIENT)
Dept: GASTROENTEROLOGY | Facility: CLINIC | Age: 75
End: 2025-01-06
Payer: MEDICARE

## 2025-01-06 ENCOUNTER — PATIENT MESSAGE (OUTPATIENT)
Dept: NEUROLOGY | Facility: CLINIC | Age: 75
End: 2025-01-06
Payer: MEDICARE

## 2025-01-07 ENCOUNTER — ANESTHESIA (OUTPATIENT)
Dept: ENDOSCOPY | Facility: HOSPITAL | Age: 75
End: 2025-01-07
Payer: MEDICARE

## 2025-01-07 ENCOUNTER — PATIENT MESSAGE (OUTPATIENT)
Dept: INTERNAL MEDICINE | Facility: CLINIC | Age: 75
End: 2025-01-07
Payer: MEDICARE

## 2025-01-08 ENCOUNTER — TELEPHONE (OUTPATIENT)
Dept: INTERNAL MEDICINE | Facility: CLINIC | Age: 75
End: 2025-01-08
Payer: MEDICARE

## 2025-01-08 NOTE — TELEPHONE ENCOUNTER
Did we get a prior auth request for [facility] for this pt?   Dr Jeffries, the medical direction for the new Health Plan is asking me to help with this so I am reaching out.

## 2025-01-08 NOTE — TELEPHONE ENCOUNTER
----- Message from Puma Jeffries MD sent at 1/8/2025  8:37 AM CST -----  Regarding: RE: expedited appeal  I'll try to help as much as I can - and the pharmacy is great for starting these and staying on them. They just need the documentation. I'll see what we can do to get the details. In the meantime, I don't think you don't have to put dates and drugs, just the broad strokes (this is the only thing that works) and if we need to get into details, we can work on it. However, it probably is a good idea, given his long history, that we review it and go over it sometime. But for now, I really want to help smooth this transition for him - so I think the broad strokes will do. I will let you know if I need more, and what that is (if it has to come from you). In the meantime, please let me know how I can help in any way. Puma.  ----- Message -----  From: Haseeb Puentes MD  Sent: 1/7/2025   4:48 PM CST  To: Puma Jeffries MD  Subject: RE: expedited appeal                             Thanks for the note and happy to try to help.  Out confess I have never done 1 of these electronic prior authorizations.  Our nurses do them but I usually use the [facility] pharmacy which does them for us.  He is relatively new to me as a patient so all of those prior meds came before our time together but I am reviewing his chart and I will reach out to him to see if he can recall specifically what meds he was on and failed.  I know he was seeing Dr. Faith Camargo in Psychiatry and some other psychologists in the past.  I am sure there were others that predated these.  ----- Message -----  From: Puma Jeffries MD  Sent: 1/7/2025   4:24 PM CST  To: Haseeb Puentes MD  Subject: expedited appeal                                 Xavi,  I'm now the CMO for [employer] - and Raffy is now one of our members. (I know Raffy and Valarie outside the hospital, so I am using his first name.) They switched to [employer] this year.    Since he changed  insurers, he has to get things approved again.   [employer] (our PBM) denied his request for his antidepressant, Emsam.    Can I get you to make an expedited appeal via the electronic PA system (ePA)? I don't know if the pharmacy handles this, but I am sure they will want documentation at the very least.     If you can make a statement with the information stating that he had a trial and failure of other antidepressant therapy and any other relevant clinical information as to why other formulary alternatives would not be appropriate? Even if you can put it in a note, I'll point them to it.     Thanks. If you have any issues with your patients, please let me know - I am here to help get the care needed for our members. Monica

## 2025-01-10 ENCOUNTER — TELEPHONE (OUTPATIENT)
Dept: ENDOSCOPY | Facility: HOSPITAL | Age: 75
End: 2025-01-10
Payer: MEDICARE

## 2025-01-10 NOTE — PROGRESS NOTES
[facility]  Occupational Therapy Treatment Note     Date: 7/31/2024  Name: Raffy Rutherford Jr.  Clinic Number: [id number]    Therapy Diagnosis:   Encounter Diagnosis   Name Primary?    Left wrist pain Yes     Physician: Kaye Solis MD  Physician Orders: eval and treat  Medical Diagnosis: DeQuervains  Onset: since shoulder surgery earlier this year  Evaluation Date: 07/17/2024  Insurance Authorization Period Expiration: 07/15/2025  Plan of Care Expiration: 10/11/2024  Date of Return to MD: PRN  Visit # / Visits authorized: 2/20  FOTO: 1/3     FOTO Lobby Code: [id number]      Precautions:  Standard, falls     Time In: 11am  Time Out: 1150am  Total Appointment Time (timed & untimed codes): 50 minutes        Subjective     Patient reports: The way Vickie taped it last session really helped.   He was compliant with home exercise program given last session.   Response to previous treatment:less pain  Functional change: hasn't played yet but will try    Pain: 3-4/10  Location: left wrists      Objective     Objective Measures updated at progress report unless specified.    Elbow and Wrist ROM. Measured in degrees. WNL     WNL:      Strength (Dynamometer) and Pinch Strength (Pinch Gauge)  Measured in pounds.    7/19/2024 7/19/2024     right left   Rung II 75 65   Key Pinch 13 10   3pt Pinch 12 12      Sensation:   Some numbness along radial and ulnar nerves in hand        Manual Muscle Test    7/19/2024 7/19/2024     Right Left   Wrist Extension  5/5 5/5   Wrist Flexion 5/5 5/5          Limitation/Restriction for FOTO hand Survey     Therapist reviewed FOTO scores for Raffy Rutherford JrLuisa on 7/17/2024.   FOTO documents entered into [employer] - see Media section.     Limitation Score: 43%  FOTO score not truly reflective of hand due to being 12 weeks post op from shoulder surgery  on the same side            Treatment         Raffy received the treatments listed below:      Raffy received the  The request has been approved. The authorization is effective from 01/10/2025 to 01/10/2026, as long as the member is enrolled in their current health plan. A written notification letter will follow with additional details.     following supervised modalities after being cleared for contradictions for 10 minutes:   Patient received paraffin bath to bilateral hand(s) for 10 minutes to increase blood flow, circulation, pain management and for tissue elasticity prior to therex.        manual therapy techniques: Soft tissue Mobilization were applied to the: 20 for Left wrist minutes, including:  Iastym massage with tissue movers to dorsal/radial/volar/ulnar wrist       neuromuscular re-education activities to improve: Sense, Proprioception, and Posture for 20 minutes. The following activities were included:  Use of KT for myofascial correction to decrease pain and increase movement      Patient Education and Home Exercises     Education provided:   - HEP  - Progress towards goals     Written Home Exercises Provided: Patient instructed to cont prior HEP.  Exercises were reviewed and Raffy was able to demonstrate them prior to the end of the session.  Raffy demonstrated good  understanding of the home exercise program provided. See electronic medical record under Patient Instructions for exercises provided during therapy sessions.       Assessment     Pt. Reports decrease in symptoms from last session. Reports continued symptoms especially with PT passive ROM of shoulder into ER. KT applied to wrist for DeQuervains as well as along upper trap, medial bicep, and infraspinatus. Pt. Reports decrease in symptoms on hand and slight decrease of symptoms of migraine.      Raffy is progressing well towards his goals and there are no updates to goals at this time. Pt prognosis is Good.     Patient will continue to benefit from skilled outpatient occupational therapy to address the deficits listed in the problem list on initial evaluation provide patient/family education and to maximize patient's level of independence in the home and community environment.     Patient's spiritual, cultural and educational needs considered and patient agreeable to plan  of care and goals.    Anticipated barriers to occupational therapy:     Goals:  Long Term Goals (LTGs); to be met by discharge.  Pt. Will return to playing cello with 1/10 pain  Pt. Will increase  strength by 5#        Plan     Updates/Grading for next session: cont to progress as able    SHANTI Magana/L,CHT   7/31/2024

## 2025-01-11 NOTE — TELEPHONE ENCOUNTER
Reached out to our pharmacy staff to do a test claim and was told the insurance coverage start date is 01/11/2025 so they can't run a test claim until then.  Pt's wife said she was told the med is on Tier 5 so it will require a (pharmacy exception) letter of medical necessity from the ordering provider explaining pt's need w/ reference of previous coverage under previous insurer and tried and failed meds and noted therapeutic efficacy since pt has been on this med.

## 2025-01-15 ENCOUNTER — PATIENT MESSAGE (OUTPATIENT)
Dept: DERMATOLOGY | Facility: CLINIC | Age: 75
End: 2025-01-15
Payer: MEDICARE

## 2025-01-16 ENCOUNTER — PATIENT MESSAGE (OUTPATIENT)
Dept: GASTROENTEROLOGY | Facility: CLINIC | Age: 75
End: 2025-01-16
Payer: MEDICARE

## 2025-01-16 ENCOUNTER — PATIENT MESSAGE (OUTPATIENT)
Dept: ORTHOPEDICS | Facility: CLINIC | Age: 75
End: 2025-01-16
Payer: MEDICARE

## 2025-01-16 ENCOUNTER — LAB VISIT (OUTPATIENT)
Dept: LAB | Facility: HOSPITAL | Age: 75
End: 2025-01-16
Attending: RADIOLOGY
Payer: MEDICARE

## 2025-01-16 DIAGNOSIS — C61 PROSTATE CANCER: ICD-10-CM

## 2025-01-16 DIAGNOSIS — K63.8219 SMALL INTESTINAL BACTERIAL OVERGROWTH (SIBO): Primary | ICD-10-CM

## 2025-01-16 LAB — COMPLEXED PSA SERPL-MCNC: 10.1 NG/ML (ref 0–4)

## 2025-01-16 PROCEDURE — 36415 COLL VENOUS BLD VENIPUNCTURE: CPT | Performed by: RADIOLOGY

## 2025-01-16 PROCEDURE — 84153 ASSAY OF PSA TOTAL: CPT | Performed by: RADIOLOGY

## 2025-01-16 RX ORDER — METRONIDAZOLE 250 MG/1
250 TABLET ORAL 3 TIMES DAILY
Qty: 30 TABLET | Refills: 0 | Status: SHIPPED | OUTPATIENT
Start: 2025-01-16 | End: 2025-01-26

## 2025-01-17 DIAGNOSIS — K63.8219 SMALL INTESTINAL BACTERIAL OVERGROWTH (SIBO): ICD-10-CM

## 2025-01-20 ENCOUNTER — PATIENT MESSAGE (OUTPATIENT)
Dept: INTERNAL MEDICINE | Facility: CLINIC | Age: 75
End: 2025-01-20
Payer: MEDICARE

## 2025-01-21 ENCOUNTER — PATIENT MESSAGE (OUTPATIENT)
Dept: ORTHOPEDICS | Facility: CLINIC | Age: 75
End: 2025-01-21
Payer: MEDICARE

## 2025-01-22 ENCOUNTER — TELEPHONE (OUTPATIENT)
Facility: CLINIC | Age: 75
End: 2025-01-22
Payer: MEDICARE

## 2025-01-24 ENCOUNTER — PATIENT MESSAGE (OUTPATIENT)
Dept: DERMATOLOGY | Facility: CLINIC | Age: 75
End: 2025-01-24

## 2025-01-24 ENCOUNTER — OFFICE VISIT (OUTPATIENT)
Dept: DERMATOLOGY | Facility: CLINIC | Age: 75
End: 2025-01-24
Payer: MEDICARE

## 2025-01-24 DIAGNOSIS — D48.5 NEOPLASM OF UNCERTAIN BEHAVIOR OF SKIN: Primary | ICD-10-CM

## 2025-01-24 PROCEDURE — 88342 IMHCHEM/IMCYTCHM 1ST ANTB: CPT | Performed by: PATHOLOGY

## 2025-01-24 PROCEDURE — 11102 TANGNTL BX SKIN SINGLE LES: CPT | Mod: S$GLB,,, | Performed by: DERMATOLOGY

## 2025-01-24 PROCEDURE — 88305 TISSUE EXAM BY PATHOLOGIST: CPT | Performed by: PATHOLOGY

## 2025-01-24 PROCEDURE — 99499 UNLISTED E&M SERVICE: CPT | Mod: S$GLB,,, | Performed by: DERMATOLOGY

## 2025-01-24 PROCEDURE — 88341 IMHCHEM/IMCYTCHM EA ADD ANTB: CPT | Mod: 59 | Performed by: PATHOLOGY

## 2025-01-24 NOTE — PATIENT INSTRUCTIONS
Biopsy Wound Care Instructions    Leave the bandage on for 24 hours without getting it wet.   Clean the area once a day with a gentle soap and water, then pat dry and apply Vaseline and a bandaid.  The site should be kept moist with Vaseline at all times to improve healing. Reapply a thick coating as needed. Do not let the site air out or form a scab, as this will delay healing and worsen scarring.  If any bleeding or oozing occurs once you return home, apply firm pressure to the area for 30 minutes straight without peeking. If bleeding continues, call the office immediately.  Please message us via [facility], call us at [phone], or return to the office at any sign of increasing redness, swelling, tenderness, pain, heat, yellow drainage/discharge, or continued bleeding.      Receiving Your Pathology Results    Your pathology results will be released to you on [facility] at the same time that Dr. Agarwal receives them.   Dr. Agarwal will then message you with her interpretation of the results and/or with the plan going forward.  If you do not use [facility] or if your pathology results require more of an explanation, you will receive your results via a phone call.  If 2 weeks go by and you have not received your results, please message us via [facility] or call us at [phone] to inform us.

## 2025-01-24 NOTE — PROGRESS NOTES
Patient Information  Name: Raffy Rutherford Jr.  : 1950  MRN: [id number]     Referring Physician:  No ref. provider found   Primary Care Physician:  Haseeb Puentes MD   Date of Visit: 25      Subjective:     History of Present lllness:    Raffy Rutherford Jr. is a 74 y.o. male who presents with a chief complaint of raised lesion.    Location: R chin  Duration: 2 wks  Signs/Symptoms: growing rapidly, raised, non-healing, slightly tender  Relieving factors/Prior treatments: none    Patient was last seen: 2024.  Prior notes by myself reviewed.   Clinical documentation obtained by nursing staff reviewed.    Review of Systems    Objective:   Physical Exam   Constitutional: He appears well-developed and well-nourished. No distress.   Neurological: He is alert and oriented to person, place, and time. He is not disoriented.   Psychiatric: He has a normal mood and affect.   Skin:   Areas Examined (abnormalities noted in diagram):   Head / Face Inspection Performed            Diagram Legend     Erythematous scaling macule/papule c/w actinic keratosis       Vascular papule c/w angioma      Pigmented verrucoid papule/plaque c/w seborrheic keratosis      Yellow umbilicated papule c/w sebaceous hyperplasia      Irregularly shaped tan macule c/w lentigo     1-2 mm smooth white papules consistent with Milia      Movable subcutaneous cyst with punctum c/w epidermal inclusion cyst      Subcutaneous movable cyst c/w pilar cyst      Firm pink to brown papule c/w dermatofibroma      Pedunculated fleshy papule(s) c/w skin tag(s)      Evenly pigmented macule c/w junctional nevus     Mildly variegated pigmented, slightly irregular-bordered macule c/w mildly atypical nevus      Flesh colored to evenly pigmented papule c/w intradermal nevus       Pink pearly papule/plaque c/w basal cell carcinoma      Erythematous hyperkeratotic cursted plaque c/w SCC      Surgical scar with no sign of skin cancer recurrence      Open and  closed comedones      Inflammatory papules and pustules      Verrucoid papule consistent consistent with wart     Erythematous eczematous patches and plaques     Dystrophic onycholytic nail with subungual debris c/w onychomycosis     Umbilicated papule    Erythematous-base heme-crusted tan verrucoid plaque consistent with inflamed seborrheic keratosis     Erythematous Silvery Scaling Plaque c/w Psoriasis     See annotation          [] Data reviewed  [] Prior external notes reviewed  [] Independent review of test  [] Management discussed with another provider  [] Independent historian    Assessment / Plan:      Pathology Orders:       Normal Orders This Visit    Specimen to Pathology, Dermatology     Questions:    Procedure Type: Dermatology and skin neoplasms    Number of Specimens: 1    ------------------------: -------------------------    Spec 1 Procedure: Biopsy    Spec 1 Clinical Impression: r/o SCC vs ISK    Spec 1 Source: right lateral chin    Release to patient:           Neoplasm of uncertain behavior of skin  -     Specimen to Pathology, Dermatology      Shave biopsy procedure note:  Risk, benefits, and alternatives of biopsy are discussed with the patient, including risk of infection, scar, recurrence, and need for additional treatment of site. The patient agrees to the procedure by verbal consent. The area is marked and prepped with alcohol.  Approximately 1 mL of lidocaine 1% with epinephrine is used for local anesthesia. A sharp blade is used to remove the lesion. The specimen is sent for pathology. Hemostasis is obtained with aluminum chloride and/or monopolar hyfrecation if needed. The area is then dressed and bandaged. The patient tolerated the procedure well without adverse event. Written instructions on wound care were given and were reviewed with the patient, who is to call for any signs of bleeding or infection. The patient will be notified of the pathology results.           Follow up dependent  on pathology results.      Mariel Agarwal MD, FAAD  [facility]

## 2025-01-27 ENCOUNTER — OFFICE VISIT (OUTPATIENT)
Dept: RADIATION ONCOLOGY | Facility: CLINIC | Age: 75
End: 2025-01-27
Attending: RADIOLOGY
Payer: MEDICARE

## 2025-01-27 DIAGNOSIS — C61 PROSTATE CANCER: Primary | ICD-10-CM

## 2025-01-27 NOTE — PROGRESS NOTES
The patient location is: home  The chief complaint leading to consultation is: prostate cancer     Visit type: audiovisual    Face to Face time with patient: 12 minutes   20 minutes of total time spent on the encounter, which includes face to face time and non-face to face time preparing to see the patient (eg, review of tests), Obtaining and/or reviewing separately obtained history, Documenting clinical information in the electronic or other health record, Independently interpreting results (not separately reported) and communicating results to the patient/family/caregiver, or Care coordination (not separately reported).     Each patient to whom he or she provides medical services by telemedicine is:  (1) informed of the relationship between the physician and patient and the respective role of any other health care provider with respect to management of the patient; and (2) notified that he or she may decline to receive medical services by telemedicine and may withdraw from such care at any time.    Notes:  [facility] Radiation Oncology     Patient ID: Raffy Rutherford Jr. is a 74 y.o. male.    Chief Complaint: No chief complaint on file.      Mr. Rutherford has a history clinical stage IIB (T1c, N0, M0, PSA < 10, GG2) prostate cancer.  He presented for further evaluation after repeat PSA on 5/25/21 returned at 8.2 ng/ml.  MRI on 5/25/21 revealed a 46.7 cc prostate with a 1.3 cm T2 hypointense lesion in the Rt. mid gland, PI-RADS 4.  There was no extraprostatic extension.  The seminal vesicles and neurovascular bundles were unremarkable.  Biopsies on 6/15/21 revealed [place] 7 (3+4) adenocarcinoma involving 29% of 4 of 6 cores from the targeted lesion in the Rt. apex.  The Daniel pattern 4 accounted for 10 - 20% of the tumor.  There was a small focus of atypical glands at the Lt. apex but the remaining biopsies revealed benign prostatic tissue.  Polaris recurrence score returned at 3.2 which is  at the borderline of active surveillance and single modality treatment.   The patient has continued with active surveillance.  Today the patient states he feels well.  No  complaints.       Review of Systems   Constitutional:  Negative for activity change, appetite change and fatigue.   Genitourinary:  Negative for bladder incontinence, difficulty urinating, dysuria, frequency and hematuria.     Physical Exam  Constitutional:       General: He is not in acute distress.     Appearance: Normal appearance.   Neurological:      Mental Status: He is alert and oriented to person, place, and time.   Psychiatric:         Mood and Affect: Mood normal.         Judgment: Judgment normal.        Latest Reference Range & Units 08/07/23 09:39 08/25/23 11:35 01/29/24 14:20 06/12/24 09:40 01/16/25 13:00   PSA Diagnostic 0.00 - 4.00 ng/mL 10.7 (H) 11.1 (H) 9.3 (H) 9.0 (H) 10.1 (H)   (H): Data is abnormally high    MRI in September of 2024 revealed Stable PI-RADS 4 lesions in the anterior peripheral and transition zones.  Lesions abut each other and may be contiguous.     Overall Assessment: PI-RADS 4 - High (clinically significant cancer is likely to be present)     Number of targets created for potential MR/US fusion biopsy     Peripheral zone: 1     Transition zone: 1     Assessment and Plan    Prostate cancer  currently  on active surveillance.  No significant change in PSA or MRI scan.  Last biopsies were in 2021.  Explained we may want to consider repeat biopsies at this time to verify no significant local progression of disease.  Will plan to discuss with Dr. Cheng.  Patient agreeable to repeat biopsy if recommended.

## 2025-01-28 ENCOUNTER — OFFICE VISIT (OUTPATIENT)
Dept: ORTHOPEDICS | Facility: CLINIC | Age: 75
End: 2025-01-28
Payer: MEDICARE

## 2025-01-28 ENCOUNTER — TELEPHONE (OUTPATIENT)
Dept: UROLOGY | Facility: CLINIC | Age: 75
End: 2025-01-28
Payer: MEDICARE

## 2025-01-28 DIAGNOSIS — M21.372 BILATERAL FOOT-DROP: Primary | ICD-10-CM

## 2025-01-28 DIAGNOSIS — M21.371 BILATERAL FOOT-DROP: Primary | ICD-10-CM

## 2025-01-28 DIAGNOSIS — M54.10 RADICULOPATHY, UNSPECIFIED SPINAL REGION: ICD-10-CM

## 2025-01-28 DIAGNOSIS — C61 PROSTATE CANCER: Primary | ICD-10-CM

## 2025-01-28 LAB
FINAL PATHOLOGIC DIAGNOSIS: NORMAL
GROSS: NORMAL
Lab: NORMAL
MICROSCOPIC EXAM: NORMAL

## 2025-01-28 PROCEDURE — 1157F ADVNC CARE PLAN IN RCRD: CPT | Mod: CPTII,S$GLB,, | Performed by: NURSE PRACTITIONER

## 2025-01-28 PROCEDURE — 99999 PR PBB SHADOW E&M-EST. PATIENT-LVL IV: CPT | Mod: PBBFAC,,, | Performed by: NURSE PRACTITIONER

## 2025-01-28 PROCEDURE — 1160F RVW MEDS BY RX/DR IN RCRD: CPT | Mod: CPTII,S$GLB,, | Performed by: NURSE PRACTITIONER

## 2025-01-28 PROCEDURE — 3288F FALL RISK ASSESSMENT DOCD: CPT | Mod: CPTII,S$GLB,, | Performed by: NURSE PRACTITIONER

## 2025-01-28 PROCEDURE — 1159F MED LIST DOCD IN RCRD: CPT | Mod: CPTII,S$GLB,, | Performed by: NURSE PRACTITIONER

## 2025-01-28 PROCEDURE — 1101F PT FALLS ASSESS-DOCD LE1/YR: CPT | Mod: CPTII,S$GLB,, | Performed by: NURSE PRACTITIONER

## 2025-01-28 PROCEDURE — 1125F AMNT PAIN NOTED PAIN PRSNT: CPT | Mod: CPTII,S$GLB,, | Performed by: NURSE PRACTITIONER

## 2025-01-28 PROCEDURE — 99213 OFFICE O/P EST LOW 20 MIN: CPT | Mod: S$GLB,,, | Performed by: NURSE PRACTITIONER

## 2025-01-28 RX ORDER — METHYLPREDNISOLONE 4 MG/1
TABLET ORAL
Qty: 21 EACH | Refills: 0 | Status: SHIPPED | OUTPATIENT
Start: 2025-01-28 | End: 2025-02-18

## 2025-01-28 RX ORDER — CEFTRIAXONE 1 G/1
1 INJECTION, POWDER, FOR SOLUTION INTRAMUSCULAR; INTRAVENOUS
Status: SHIPPED | OUTPATIENT
Start: 2025-01-28 | End: 2025-01-28

## 2025-01-28 RX ORDER — CIPROFLOXACIN 500 MG/1
500 TABLET ORAL ONCE
Qty: 1 TABLET | Refills: 0 | Status: SHIPPED | OUTPATIENT
Start: 2025-01-28 | End: 2025-01-30

## 2025-01-28 NOTE — PROGRESS NOTES
CC: Pain of the Left Hip      HPI:   Pt with c/o left buttock pain that radiates from the low back into the buttock and down the lateral leg. There isn't pain directly over the greater trochanter like there usually is. He has had some back issues in the past including lumbar spine surgery. Recently he has been using a lift chair following a shoulder surgery and that seems to be what started the pain in the buttock. He was previously going to physical therapy and that was helping, but it stopped at the beginning of the year due to insurance changes. He is ambulating without assistive device. There is not a limp.      ROS  General: denies fever and chills  Resp: no c/o sob  CVS: no c/o cp  MSK: c/o left buttock pain    PE  General: AAOx3, pleasant and cooperative  Resp: respirations even and unlabored  MSK: left hip exam  - [facility]  - straight leg raise  - pain with internal rotation  - pain with external rotation  - pain over the greater trochanter      Assessment:  Lumbar radiculopathy    Plan:  Medrol dose pack  Physical therapy orders entered for [place] location  F/u as needed

## 2025-01-29 NOTE — PROGRESS NOTES
Final Pathologic Diagnosis Skin, right lateral chin, shave biopsy:  -VERRUCA VULGARIS, IRRITATED AND INFLAMED

## 2025-01-30 DIAGNOSIS — Z00.00 ENCOUNTER FOR MEDICARE ANNUAL WELLNESS EXAM: ICD-10-CM

## 2025-02-03 ENCOUNTER — CLINICAL SUPPORT (OUTPATIENT)
Dept: REHABILITATION | Facility: HOSPITAL | Age: 75
End: 2025-02-03
Payer: MEDICARE

## 2025-02-03 DIAGNOSIS — M54.42 CHRONIC BILATERAL LOW BACK PAIN WITH BILATERAL SCIATICA: ICD-10-CM

## 2025-02-03 DIAGNOSIS — M21.371 BILATERAL FOOT-DROP: ICD-10-CM

## 2025-02-03 DIAGNOSIS — M54.10 RADICULOPATHY, UNSPECIFIED SPINAL REGION: ICD-10-CM

## 2025-02-03 DIAGNOSIS — M54.41 CHRONIC BILATERAL LOW BACK PAIN WITH BILATERAL SCIATICA: ICD-10-CM

## 2025-02-03 DIAGNOSIS — G89.29 CHRONIC BILATERAL LOW BACK PAIN WITH BILATERAL SCIATICA: ICD-10-CM

## 2025-02-03 DIAGNOSIS — M21.372 BILATERAL FOOT-DROP: ICD-10-CM

## 2025-02-03 DIAGNOSIS — M21.371 FOOT DROP, BILATERAL: Primary | ICD-10-CM

## 2025-02-03 DIAGNOSIS — M21.372 FOOT DROP, BILATERAL: Primary | ICD-10-CM

## 2025-02-03 PROCEDURE — 97110 THERAPEUTIC EXERCISES: CPT

## 2025-02-03 PROCEDURE — 97161 PT EVAL LOW COMPLEX 20 MIN: CPT

## 2025-02-04 ENCOUNTER — PROCEDURE VISIT (OUTPATIENT)
Dept: NEUROLOGY | Facility: CLINIC | Age: 75
End: 2025-02-04
Payer: MEDICARE

## 2025-02-04 ENCOUNTER — TELEPHONE (OUTPATIENT)
Dept: INTERNAL MEDICINE | Facility: CLINIC | Age: 75
End: 2025-02-04
Payer: MEDICARE

## 2025-02-04 VITALS
DIASTOLIC BLOOD PRESSURE: 65 MMHG | SYSTOLIC BLOOD PRESSURE: 101 MMHG | BODY MASS INDEX: 27.42 KG/M2 | WEIGHT: 180.31 LBS

## 2025-02-04 DIAGNOSIS — G43.711 CHRONIC MIGRAINE WITHOUT AURA, WITH INTRACTABLE MIGRAINE, SO STATED, WITH STATUS MIGRAINOSUS: Primary | ICD-10-CM

## 2025-02-04 DIAGNOSIS — G43.909 MIGRAINE WITHOUT STATUS MIGRAINOSUS, NOT INTRACTABLE, UNSPECIFIED MIGRAINE TYPE: ICD-10-CM

## 2025-02-04 PROBLEM — M54.50 LOW BACK PAIN: Status: ACTIVE | Noted: 2025-02-04

## 2025-02-04 PROBLEM — M21.371 FOOT DROP, BILATERAL: Status: ACTIVE | Noted: 2025-02-04

## 2025-02-04 PROBLEM — M21.372 FOOT DROP, BILATERAL: Status: ACTIVE | Noted: 2025-02-04

## 2025-02-04 PROCEDURE — 99214 OFFICE O/P EST MOD 30 MIN: CPT | Mod: 25,S$GLB,, | Performed by: NURSE PRACTITIONER

## 2025-02-04 PROCEDURE — 64615 CHEMODENERV MUSC MIGRAINE: CPT | Mod: S$GLB,,, | Performed by: NURSE PRACTITIONER

## 2025-02-04 RX ORDER — BUTALBITAL, ACETAMINOPHEN AND CAFFEINE 50; 325; 40 MG/1; MG/1; MG/1
1 TABLET ORAL DAILY PRN
Qty: 14 TABLET | Refills: 0 | Status: SHIPPED | OUTPATIENT
Start: 2025-02-04

## 2025-02-04 NOTE — PATIENT INSTRUCTIONS
Home exercise program to be performed daily:  -long sitting calf stretch: 10 reps, 10 second hold  -long sitting ankle pumps: 3 x 10 reps, 3 second hold  -sit to stand: 3 x 10 reps

## 2025-02-04 NOTE — PROGRESS NOTES
Outpatient Rehab    Physical Therapy Evaluation    Patient Name: Raffy Rutherford Jr.  MRN: [id number]  YOB: 1950  Today's Date: 2/4/2025    Therapy Diagnosis:   Encounter Diagnoses   Name Primary?    Bilateral foot-drop     Radiculopathy, unspecified spinal region     Foot drop, bilateral Yes    Chronic bilateral low back pain with bilateral sciatica      Physician: Lisandra Trinh NP    Physician Orders: Eval and Treat  Medical Diagnosis: Bilateral foot-drop [M21.371, M21.372], Radiculopathy, unspecified spinal region [M54.10]     Visit # / Visits Authorized:  1 / 1   Date of Evaluation:  2/3/2025   Insurance Authorization Period: 1/28/2025 to 1/28/2026  Plan of Care Certification:  2/3/2025 to 4/14/2025      Time In: 1110   Time Out: 1200  Total Time: 50 minutes  Total Billable Time: 50 minutes         Subjective   History of Present Illness  Raffy is a 74 y.o. male who reports to physical therapy with a chief concern of drop foot, decreased lower extremity strength, low back pain. According to the patient's chart, Raffy has a past medical history of Allergy, Arthritis, Back pain, Cataract, Chronic pain, Cluster headache, Colon polyps, Degenerative disc disease, Depression, Diverticulitis, Dry eye syndrome, Fibromyalgia, GERD (gastroesophageal reflux disease), Hepatitis, History of prostate biopsy, Hyperlipidemia, Joint pain, Prostate cancer, PVD (posterior vitreous detachment), Salzmann's nodular dystrophy of left eye, Sleep apnea, Thyroid nodule, Trigeminal neuralgia of left side of face, Tubular adenoma of colon, and Visual disturbance. Raffy has a past surgical history that includes Knee surgery; Hemorrhoid surgery; Cholecystectomy; Sinus surgery; Eye surgery; Back surgery; Joint replacement; Total hip arthroplasty (04/2012); Cosmetic surgery (02/10/2015); Cosmetic surgery (02/10/2015); Cataract extraction w/  intraocular lens implant (Bilateral); Spinal fusion (06/22/2015); Injection of  "steroid (Right, 12/06/2018); Injection of steroid (Right, 09/19/2019); Esophagogastroduodenoscopy (N/A, 12/17/2019); Colonoscopy (N/A, 12/17/2019); Cystoscopy with ureteroscopy, retrograde pyelography, and insertion of stent (Left, 08/19/2020); Fusion of lumbar spine by anterior approach (N/A, 08/20/2020); Ulnar nerve transposition (Left, 12/16/2020); Carpal tunnel release (Right, 05/18/2021); Colonoscopy (2007); Repair of extensor tendon (Left, 04/07/2022); Irrigation and debridement of upper extremity (Left, 04/07/2022); Arthroscopic debridement of shoulder (Left, 04/19/2024); Arthroscopic repair of rotator cuff of shoulder (Left, 04/19/2024); arthroscopy,shoulder,with biceps tenodesis (Left, 04/19/2024); Arthroscopy of shoulder with decompression of subacromial space (Left, 04/19/2024); and Shoulder surgery.    The patient reports a medical diagnosis of Bilateral foot-drop (M21.371, M21.372), Radiculopathy, unspecified spinal region (M54.10).            History of Present Condition/Illness: Patient has been to physical therapy before for low back pain and decreased low extremity strength. He reports he is a fall risk, and he usually stubs his toe or has an obstruction in his way. He has chronic back pain and wears his generic braces to improve dorsiflexion for ambulation. He has a variation of Charcot-Selena Tooth. Also reports he has neuropathy in both feet. Had a "bizarre" injury when he was 30 years old and had a sofa fall on him from 3 story apartment and had compression fracture of T12. He has had dry needling for low back. 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     Activities of Daily Living  Social history was obtained from Patient.               Previously independent with activities of daily living? Yes     Currently independent with activities of daily living? Yes     Difficulty with standing, sitting/standing, walking without DF braces.         Pain     Patient reports a current pain " level of 7/10. Pain at best is reported as 3/10. Pain at worst is reported as 9/10.   Clinical Progression (since onset): Worsening           Past Medical History/Physical Systems Review:   Raffy Rutherford Jr.  has a past medical history of Allergy, Arthritis, Back pain, Cataract, Chronic pain, Cluster headache, Colon polyps, Degenerative disc disease, Depression, Diverticulitis, Dry eye syndrome, Fibromyalgia, GERD (gastroesophageal reflux disease), Hepatitis, History of prostate biopsy, Hyperlipidemia, Joint pain, Prostate cancer, PVD (posterior vitreous detachment), Salzmann's nodular dystrophy of left eye, Sleep apnea, Thyroid nodule, Trigeminal neuralgia of left side of face, Tubular adenoma of colon, and Visual disturbance.    Raffy Rutherford Jr.  has a past surgical history that includes Knee surgery; Hemorrhoid surgery; Cholecystectomy; Sinus surgery; Eye surgery; Back surgery; Joint replacement; Total hip arthroplasty (04/2012); Cosmetic surgery (02/10/2015); Cosmetic surgery (02/10/2015); Cataract extraction w/  intraocular lens implant (Bilateral); Spinal fusion (06/22/2015); Injection of steroid (Right, 12/06/2018); Injection of steroid (Right, 09/19/2019); Esophagogastroduodenoscopy (N/A, 12/17/2019); Colonoscopy (N/A, 12/17/2019); Cystoscopy with ureteroscopy, retrograde pyelography, and insertion of stent (Left, 08/19/2020); Fusion of lumbar spine by anterior approach (N/A, 08/20/2020); Ulnar nerve transposition (Left, 12/16/2020); Carpal tunnel release (Right, 05/18/2021); Colonoscopy (2007); Repair of extensor tendon (Left, 04/07/2022); Irrigation and debridement of upper extremity (Left, 04/07/2022); Arthroscopic debridement of shoulder (Left, 04/19/2024); Arthroscopic repair of rotator cuff of shoulder (Left, 04/19/2024); arthroscopy,shoulder,with biceps tenodesis (Left, 04/19/2024); Arthroscopy of shoulder with decompression of subacromial space (Left, 04/19/2024); and Shoulder surgery.    Raffy  has a current medication list which includes the following prescription(s): alendronate, atorvastatin, butalbital-acetaminophen-caffeine -40 mg, celecoxib, clindamycin, clonazepam, vevye, dicyclomine, ergocalciferol, ajovy syringe, hydrocodone-acetaminophen, hydrocodone-acetaminophen, hydrocodone-acetaminophen, hydrocortisone-pramoxine, ketoconazole, lamotrigine, methocarbamol, methylprednisolone, miebo (pf), pregabalin, pregabalin, pregabalin, rabeprazole, emsam, trazodone, triamcinolone acetonide 0.025%, ubrogepant, and ubrogepant, and the following Facility-Administered Medications: onabotulinumtoxina.    Review of patient's allergies indicates:   Allergen Reactions    Alphagan [brimonidine]      Patient taking MASO-B Selective Inhibitor Selegiline (Emsam)    Coumadin [warfarin]      itch    Oxycodone      hiccups        Objective       Ankle/Foot Range of Motion      Will assess in future visit.               Hip Strength - Planes of Motion   Right Strength Right Pain Left Strength Left  Pain   Flexion (L2) 3+   3+     Extension           ABduction           ADduction           Internal Rotation 4   4     External Rotation 4   4         Knee Strength   Right Strength Right Pain Left Strength Left  Pain   Flexion (S2) 4   4     Prone Flexion           Extension (L3) 4   4            Ankle/Foot Strength - Planes of Motion   Right Strength Right Pain Left Strength Left  Pain   Dorsiflexion (L4) 2-   2     Plantar Flexion (S1) 1   1     Inversion           Eversion           Great Toe Flexion           Great Toe Extension (L5)           Lesser Toes Flexion           Lesser Toes Extension           Negative bilaterally for ankle clonus      Transfers Assessment  Sit to Stand Assistance: Supervision      Fall Risk  Functional mobility test results suggest the patient is: At Risk for Falls  Sit to Stand Testing      The patient completed 8 repetitions of a sit to stand transfer in 30 seconds.           Gait  Analysis  Base of Support: Normal  Walking Speed: Decreased    Right Foot Contact Pattern: Flat foot    Left Foot Contact Pattern: Flat foot  Gait Analysis Details  Patient ambulates with bilateral generic AFOs.         Intake Outcome Measure for FOTO Survey    Therapist reviewed FOTO scores for Raffy Rutherford Jr. on 2/3/2025.   FOTO report - see Media section or FOTO account episode details.     Intake Score: 44% (lumbar spine), 24% (foot)    Treatment:  Balance/Neuromuscular Re-Education  Balance/Neuromuscular Re-Education Activity 1: long sitting calf stretch: 10 second hold, long sitting ankle pumps: 5 reps, 3 second hold, sit to stand: 2 reps    Patient's spiritual, cultural, and educational needs considered and patient agreeable to plan of care and goals.     Assessment & Plan   Assessment  Raffy presents with a condition of Moderate complexity.           Functional Limitations: Activity tolerance, Ambulating on uneven surfaces, Completing self-care activities, Functional mobility, Gait limitations, Range of motion, Transfers  Impairments: Impaired balance, Activity intolerance, Abnormal muscle firing  Personal Factors Affecting Prognosis: Physical limitations    Patient Goal for Therapy (PT): to strengthen lower extremities  Prognosis: Fair  Prognosis Details: Patient has extensive history with physical therapy address low back pain and foot drop.   Assessment Details: Raffy is a 74-year-old male who presents to physical therapy with medical diagnosis of bilateral foot-drop M21.371, M21.372, radiculopathy, unspecified spinal region M54.10. Patient presents with signs and symptoms of bilateral drop with decreased ankle strength bilaterally (right>left), low back pain with extension, decreased ankle range of motion, decreased functional mobility, and increased risk of falls. Patient ambulates with dorsiflexion AFOs to prevent dragging feet in gait. He is appropriate for physical therapy to address the above  deficits and promote global strengthening.     Plan  From a physical therapy perspective, the patient would benefit from: Skilled Rehab Services    Planned therapy interventions include: Therapeutic exercise, Therapeutic activities, Neuromuscular re-education, and Manual therapy.    Planned modalities to include: Electrical stimulation - passive/unattended, Cryotherapy (cold pack), and Thermotherapy (hot pack).        Visit Frequency: 2 times Per Week for 10 Weeks.       This plan was discussed with Patient.   Discussion participants: Agreed Upon Plan of Care  Plan details: Dry needling          Goals:   Active       Changing body position       Patient will demonstrate moving sit to stand without upper extremity support for independence with transfers.        Start:  02/04/25    Expected End:  04/14/25               Functional outcome       Patient will show a significant change in FOTO patient-reported outcome tool to demonstrate subjective improvement       Start:  02/04/25    Expected End:  04/14/25            Patient will demonstrate independence in home program for support of progression       Start:  02/04/25    Expected End:  03/14/25               Pain       Patient will report a 2 point reduction in pain while performing ambulation       Start:  02/04/25    Expected End:  03/14/25               Strength       Patient will achieve bilateral knee extension strength of 4/5       Start:  02/04/25    Expected End:  04/14/25            Patient will achieve bilateral ankle dorsiflexion strength of 4/5       Start:  02/04/25    Expected End:  04/14/25                Mariel Oscar, PT, DPT

## 2025-02-04 NOTE — TELEPHONE ENCOUNTER
Notified pt of message from pharmacy re:EMSAM cost and she explained they have already met their 2000.00 deductible so the medication will be more affordable and pharmacy staff explained if they needed a pmt plan it is available. - Pt's wife is asking for a decrease to 9mg on the next Rx near March.

## 2025-02-04 NOTE — PROCEDURES
SUBJECTIVE:  Patient ID: Raffy Rutherford Jr.  Chief Complaint: Botulinum Toxin Injection and Follow-up    History of Present Illness:  Raffy Rutherford Jr. is a 74 y.o. male who presents to clinic alone for follow-up of headaches and Botox injections.     02/04/2025- Interval History:  Has noticed headaches begin to worsen in the 1-2 weeks after injecting ajovy and improve in the 2 weeks leading up to his next dose, plans to stop ajovy.  Continues to feel Botox is very effective and does wish to continue with today's injections as scheduled.    The patient has chronic migraines ( G43.719) and suffers from headaches more than 15 days per month, each lasting more than 4 hours with at least 8 attacks that meet criteria for migraine. Patient has continued to achieve a greater than 50% reduction in the frequency of their migraines with continued Botox injections.  Wishes to proceed with today's injections as scheduled.     Otherwise, information below is still accurate and current.     05/21/2024- Interval History:  Migraines have been under very good control over the last 12 weeks.  Has seen a slight uptick in migraine frequency over the last week or so.  Patient has continued to achieve a greater than 50% reduction in the frequency of their migraines with continued Botox injections.  Wishes to proceed with today's injections as scheduled.      Otherwise, information below is still accurate and current.      03/06/2024- Interval History:  Migraines had been well controlled until about 6 weeks ago when migraines gradually began to return.  Of note, he is currently 5 weeks overdue for his Botox.  Scents continue to be a significant migraine trigger for him, triggers a migraine to begin behind his eyes and eventually extend towards the occipitalis/traps.  More recently has noticed migraines which start with pain in the left trap, pain will eventually extend into the left occipitalis, temporalis, retro-orbitalis.  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  Patient has continued to achieve a greater than 50% reduction in the frequency of their migraines with continued Botox injections.  Wishes to proceed with today's injections as scheduled.      Otherwise, information below is still accurate and current.      11/09/2023- Interval History:  Has found perfume scents continue to be a significant trigger for him, as well as lack of sleep.  Patient has continued to achieve a greater than 50% reduction in the frequency of their migraines with continued Botox injections.  Wishes to proceed with today's injections as scheduled.      Otherwise, information below is still accurate and current.      08/24/2023- Interval History:  Migraines continue to be largely well controlled with combination Botox and Ajovy, he is currently three weeks overdue for his Botox and has had more frequent headaches/migraines during this time, indicating to him Botox is effective.  He has not taken any doses fioricet since last visit.  The Botox injections have achieved well over 50%  improvement in the patient's symptoms. Migraines have been reduced at least 7 days per month and the number of cumulative hours suffering with headaches has been reduced at least 100 total hours per month. Today he does have a headache indicating that the Botox has worn off. Frequency of treatment is every 12 weeks unless no response to the treatments, at which time we will discontinue the injections.     Otherwise, information below is still accurate and current.      05/08/2023- Interval History:  Has seen a noticeable improvement in his migraines since starting Botox injections, was down to 1-2 migraines per week, had a few weeks with no migraines.  Migraines were easily managed with over the counter medications.  Began to notice an uptick in migraine frequency 3 weeks ago, sent message via patient portal, was restarted on ajovy 225 mg SQ monthly.  Feels migraines have  leveled out since restarting ajovy.  Denies presence of side effects to Botox, is pleased with the results from first round of Botox, does wish to proceed with second round of Botox as scheduled today.      Otherwise, information below is still accurate and current.      02/13/2023- Interval History:  Headaches continue to occur on a near daily basis with full blown migraines on 12-15 days per month.  He started Ajovy and recently injected third injection, unsure whether or not ajovy was helping.  Seen by outside pain management provider who recommended and prescribed qulipta 10 mg daily, dose increased to 30 mg daily.  For a few days after increasing dose to 30 mg, began to experience issues with insomnia, unsure if this was related to qulipta vs ajovy or combination, but he does feel migraines were better after increasing dose to 30 mg.    Risks, Benefits, and potential side effects of Botox discussed with patient.  Alternative treatments offered.  After thorough discussed regarding the procedure, patient has decided to move forward with initiating Botox injections for Chronic Migraine.  Patient understands we will complete 3 rounds of injections, if after 3 rounds, we do not see a 50% reduction in headaches, we will discontinue Botox injections.      Otherwise, information below is still accurate and current.      History of Present Illness:   72 y.o. male with headaches, chronic LBP, cervical radiculopathy, anxiety, depression, WINNIE, hx of prostate cancer, who presents for evaluation of headaches via virtual visit.  Patient is established with neurology clinic, previous patient of DESTINY Norman and Dr. Millan, he is a new patient to me.    Currently suffering with headaches at least 1-2 times per week each of which last 2-4 days in duration.  Long history of headahces, beginning around 20 yo, had multiple sinus surgeries thinking headaches were sinus related, no improvement.       Headaches are described as a moderate to  severe, stabbing/searing pain beginning left retro-orbitalis, extending towards the occipitalis and into his shoulder/left arm/hand.  Headaches can last anywhere from an hour when successfully treated with fioricet or excedrin, but can last up to 4 days in duration, on average headaches are lasting about a day in duration.  Headaches are often present upon waking.   Associated symptoms include fatigue, crepitus in left shoulder, congested (on left side), more trouble concentrating than norm, photophobia, phonophobia.  Currently experiencing some sort of headache on 20-25/30 days, with migraines occurring on 12-15/30 days.    At last visit with DESTINY Norman, she restarted nurtec 75 mg odt and referred him to me for consideration of Botox. Nurtec every other day was ineffective (though was effective in the past for him).  He was then switched to ajovy 225 mg SQ monthly, was waiting for today's visit to decide whether or not to start using ajovy.   Takes lyrica as prescribed by outside pain management, for chronic back pain.   He is prescribed lamotrigine 200 mg daily for anxiety/depression.  Takes trazodone 50 mg nightly for insomnia.      Notes from DESTINY Norman:  HPI:   Mr. Raffy Rutherford Jr. is a 72 y.o. male presenting for evaluation regarding chronic low back pain. On chart review he was previously seen by Dr. Millan for both bilateral neuropathy and headache pain. He received an EMG with Dr. Millan on 03/15/2022 which showed evidence of L5/S1 radiculopahties, worse on the right; and moderate chronic reinnervation changes noted in the R vastus medialis. His current therapy regimen is Lyrica 200mg QHS. He notes he was previously seen by neurosurgery and instructed to receive an MRI lumbar spine by Dr. Caldwell but was unsure why this was ordered. We discussed that this is likely due to the evidence on the EMG indicating his bilateral foot drop may be due to a lumbar spine issue at L5/S1 level.      Headache History:   Onset-  Teenager   Frequency- 5-6 per week  Duration- All day   Location- L eye and radiates to his L jaw and then toward his L shoulder and arm  Associated symptoms- Nasal blockage on the L side, + photophobia,   Denies any aura, denies nausea/vomiting   Alleviating Factors- Half Fioricet can occasionally relieve his headaches  Aggravating Factors- Smells trigger his headaches and stress/ poor sleep, weather      Of note- patient went to [place] and found in the 3 weeks he was there he only had 2-3 headaches which were easily relieved     He states that he is also concerned regarding his headaches for which he is currently taking Ubrelvy QOD and Fioricet for abortive therapy.       Headache:  Type: iron hot stab like pain   Severity: 9/10  Location: left eye, forehead , cheek into left shoulder and into the back   Triggers: Particular scents, looking at screen for a long time   Frequency: worse in the last week, but otherwise 8-9/month   Duration:  Days if he does not take medication (2-3 days) can have a cycles of headaches for several weeks , sometimes can go 4 weeks without headache   Aura: none   Photophobia: not as much but does endorse turning the lights off or worsening of headache when he looks at his phone  Phonophobia: ++  Nausea/ vomiting: rarely gets nauseous   Aggravating factors: none   Relieving factors: Medications  Inhales an oil- mydab which has given him significant benefit  Fioricet- none   Ubrelvy- worked okay but Nurtec works better  Voltaren- he takes this for joint pains.   Excedrin- none   Aspirin makes tinnitus worse   Norco- 3 times a day - 1/2 pill Norco in am , 1/2 pill in the afternoon 1/2 + 3/4th pill in the evening. Pain worsens when he tries to taper off his pain medication  He snores at night, he has sleep apnea, he was on CPAP but not very compliant because he states he took the mask off in his sleep. He has tried different masks with no benefit.      Treatments Tried and  Response  Nurtec  lyrica  Atogepant  Baclofen   Fioricet  Celebrex  Emgality 09/08/2020 failed  Aimovig  Fioricet   Diamox   Cymbalta   Klonopin   Valium   [place]  Lamictal   Gabapentin   Indometacin   Dilaudid  Verapamil  Nurtec   Ubrelvy   Trazodone  Ajovy   Botox - helping after first round  Qulipta 60 mg - no, caused insomnia   Ajovy - helping     Current Medications:    Current Outpatient Medications:     alendronate (FOSAMAX) 70 MG tablet, Take 1 tablet (70 mg total) by mouth every 7 days. Take with a full glass of water weekly., Disp: 4 tablet, Rfl: 11    atorvastatin (LIPITOR) 40 MG tablet, Take 1 tablet (40 mg total) by mouth once daily., Disp: 90 tablet, Rfl: 1    butalbital-acetaminophen-caffeine -40 mg (FIORICET, ESGIC) -40 mg per tablet, Take 1 tablet by mouth daily as needed for 30 days., Disp: 14 tablet, Rfl: 0    celecoxib (CELEBREX) 100 MG capsule, Take 2 capsules (200 mg total) by mouth 2 (two) times daily., Disp: 180 capsule, Rfl: 3    clindamycin (CLEOCIN) 150 MG capsule, Take 4 capsules 1 hour prior to dental appointment, Disp: 12 capsule, Rfl: 1    clonazePAM (KLONOPIN) 0.5 MG tablet, Take 1 tablet by mouth twice a day as needed for anxiety, Disp: 60 tablet, Rfl: 3    cycloSPORINE (VEVYE) 0.1 % Drop, Place 1 drop into both eyes 2 (two) times a day., Disp: 2 mL, Rfl: 11    dicyclomine (BENTYL) 10 MG capsule, Take 1 capsule (10 mg total) by mouth 3 (three) times daily as needed (abdominal cramping)., Disp: 30 capsule, Rfl: 2    ergocalciferol (VITAMIN D2) 50,000 unit Cap, Take 1 capsule (50,000 Units total) by mouth every 7 days., Disp: 12 capsule, Rfl: 3    fremanezumab-vfrm (AJOVY SYRINGE) 225 mg/1.5 mL injection, Inject 1.5 mLs (225 mg total) into the skin every 28 days., Disp: 1.5 mL, Rfl: 5    HYDROcodone-acetaminophen (NORCO) 5-325 mg per tablet, Take 1 tablet by mouth every 8 (eight) hours as needed for Pain., Disp: 90 tablet, Rfl: 0    HYDROcodone-acetaminophen (NORCO) 7.5-325  mg per tablet, Take 1 tablet by mouth 4 times a day as needed for chronic pain, Disp: 120 tablet, Rfl: 0    HYDROcodone-acetaminophen (NORCO) 7.5-325 mg per tablet, Take 1 tablet by mouth 4 (four) times daily as needed., Disp: 120 tablet, Rfl: 0    hydrocortisone-pramoxine (ANALPRAM-HC) 2.5-1 % Crea, Place rectally 3 (three) times daily., Disp: 30 g, Rfl: 2    ketoconazole (NIZORAL) 2 % cream, Apply to affected areas of body folds twice daily as needed for irritation., Disp: 60 g, Rfl: 5    lamoTRIgine (LAMICTAL) 200 MG tablet, Take 1 tablet (200 mg total) by mouth once daily., Disp: 90 tablet, Rfl: 3    methocarbamoL (ROBAXIN) 750 MG Tab, Take 1 tablet (750 mg total) by mouth 3 (three) times daily., Disp: 60 tablet, Rfl: 2    methylPREDNISolone (MEDROL DOSEPACK) 4 mg tablet, use as directed, Disp: 21 each, Rfl: 0    perfluorohexyloctane, PF, (MIEBO, PF,) 100 % Drop, Place 1 drop into both eyes 4 (four) times daily., Disp: 3 mL, Rfl: 11    pregabalin (LYRICA) 25 MG capsule, Take 2 capsules (50 mg total) by mouth once daily., Disp: 90 capsule, Rfl: 1    pregabalin (LYRICA) 25 MG capsule, Take 1 capsule (25 mg total) by mouth 4 (four) times daily., Disp: 360 capsule, Rfl: 0    pregabalin (LYRICA) 75 MG capsule, Take 3 capsules (225 mg total) by mouth daily at night, Disp: 270 capsule, Rfl: 0    RABEprazole (ACIPHEX) 20 mg tablet, Take 1 tablet (20 mg total) by mouth 2 (two) times daily., Disp: 180 tablet, Rfl: 1    selegiline (EMSAM) 12 mg/24 hr, Place 1 patch onto the skin once daily., Disp: 30 patch, Rfl: 11    traZODone (DESYREL) 100 MG tablet, Take 1 tablet (100 mg total) by mouth every evening., Disp: 90 tablet, Rfl: 6    triamcinolone acetonide 0.025% (KENALOG) 0.025 % cream, Apply to affected areas of body folds twice daily as needed for irritation. Do not use for longer than 2 weeks in a row., Disp: 30 g, Rfl: 2    ubrogepant (UBRELVY) 100 mg tablet, Take 1 tablet (100 mg total) by mouth every 2 (two) hours as  needed for Migraine. Max 200 mg/day., Disp: 16 tablet, Rfl: 5    ubrogepant (UBRELVY) 100 mg tablet, Take 1 tablet (100 mg total) by mouth every 2 (two) hours as needed for Migraine. Max 200 mg/day., Disp: 16 tablet, Rfl: 5    Current Facility-Administered Medications:     onabotulinumtoxina injection 200 Units, 200 Units, Intramuscular, q12 weeks, Macie Pearson, FNP, 200 Units at 10/16/24 1313    Review of Systems - A review of 10+ systems was conducted with pertinent positive and negative findings documented in HPI with all other systems reviewed and negative.    PFSH: Past medical, family, and social history reviewed as documented in chart with pertinent positive medical, family, and social history detailed in HPI.    OBJECTIVE:  Vitals:  /65 (BP Location: Right arm, Patient Position: Sitting)   Wt 81.8 kg (180 lb 5.4 oz)   BMI 27.42 kg/m²     Physical Exam:  Constitutional: he appears well-developed and well-nourished. he is well groomed. NAD     Review of Data:   Notes from ortho, rad onc reviewed.   Labs:  Office Visit on 01/24/2025   Component Date Value Ref Range Status    Final Pathologic Diagnosis 01/24/2025    Final                    Value:Skin, right lateral chin, shave biopsy:  -VERRUCA VULGARIS, IRRITATED AND INFLAMED    This lesion is benign.      Gross 01/24/2025    Final                    Value:Hospital/clinic label MRN:  [id number]  Pathology label MRN:  [id number]     The specimen is received in formalin labeled &quot;R lateral chin&quot;. The specimen is a circular shave of hair-bearing, tan-yellow skin measuring 0.5 x 0.5 x 0.3 cm. The epidermal surface is raised and firm. The specimen is inked green at the biopsy   margin, bisected, and submitted entirely in cassette PXP--[id number] Holly  Grossing Technologist      Microscopic Exam 01/24/2025    Final                    Value:Shave biopsy sections show an exophytic and endophytic epidermal lesion with acanthosis, verrucous  papillomatosis, hypergranulosis, and hyperkeratosis.  The epithelium shows mild reactive atypia and there is an underlying brisk lichenoid inflammatory   infiltrate.  The lesion is positive for p16.  AE1/AE3 highlights the epithelium and adnexal structures.  Ki-67 proliferation index is increased but overall confined to the basal layer of the epidermis.  The lesion is negative for CD34.    Immunohistochemical stain was reviewed in conjunction with adequate positive control.  The lesion appears excised in the planes of section examined.      Disclaimer 01/24/2025 Unless the case is a 'gross only' or additional testing only, the final diagnosis for each specimen is based on a microscopic examination of appropriate tissue sections.   Final   Lab Visit on 01/16/2025   Component Date Value Ref Range Status    PSA Diagnostic 01/16/2025 10.1 (H)  0.00 - 4.00 ng/mL Final   Lab Visit on 08/14/2024   Component Date Value Ref Range Status    Lab Method 08/14/2024 See Comment   Final    Lab Comment 08/14/2024 See Comment   Final    Folate 08/14/2024 11.0  4.0 - 24.0 ng/mL Final    Vitamin B-12 08/14/2024 729  210 - 950 pg/mL Final    Vit D, 25-Hydroxy 08/14/2024 31  30 - 96 ng/mL Final    Hemoglobin A1C 08/14/2024 5.5  4.0 - 5.6 % Final    Estimated Avg Glucose 08/14/2024 111  68 - 131 mg/dL Final    TSH 08/14/2024 0.869  0.400 - 4.000 uIU/mL Final    Cholesterol 08/14/2024 195  120 - 199 mg/dL Final    Triglycerides 08/14/2024 125  30 - 150 mg/dL Final    HDL 08/14/2024 60  40 - 75 mg/dL Final    LDL Cholesterol 08/14/2024 110.0  63.0 - 159.0 mg/dL Final    HDL/Cholesterol Ratio 08/14/2024 30.8  20.0 - 50.0 % Final    Total Cholesterol/HDL Ratio 08/14/2024 3.3  2.0 - 5.0 Final    Non-HDL Cholesterol 08/14/2024 135  mg/dL Final    WBC 08/14/2024 6.41  3.90 - 12.70 K/uL Final    RBC 08/14/2024 4.94  4.60 - 6.20 M/uL Final    Hemoglobin 08/14/2024 14.8  14.0 - 18.0 g/dL Final    Hematocrit 08/14/2024 46.8  40.0 - 54.0 % Final     MCV 08/14/2024 95  82 - 98 fL Final    MCH 08/14/2024 30.0  27.0 - 31.0 pg Final    MCHC 08/14/2024 31.6 (L)  32.0 - 36.0 g/dL Final    RDW 08/14/2024 12.2  11.5 - 14.5 % Final    Platelets 08/14/2024 244  150 - 450 K/uL Final    MPV 08/14/2024 9.5  9.2 - 12.9 fL Final    Immature Granulocytes 08/14/2024 0.3  0.0 - 0.5 % Final    Gran # (ANC) 08/14/2024 3.0  1.8 - 7.7 K/uL Final    Immature Grans (Abs) 08/14/2024 0.02  0.00 - 0.04 K/uL Final    Lymph # 08/14/2024 2.7  1.0 - 4.8 K/uL Final    Mono # 08/14/2024 0.5  0.3 - 1.0 K/uL Final    Eos # 08/14/2024 0.2  0.0 - 0.5 K/uL Final    Baso # 08/14/2024 0.04  0.00 - 0.20 K/uL Final    nRBC 08/14/2024 0  0 /100 WBC Final    Gran % 08/14/2024 46.2  38.0 - 73.0 % Final    Lymph % 08/14/2024 42.0  18.0 - 48.0 % Final    Mono % 08/14/2024 7.6  4.0 - 15.0 % Final    Eosinophil % 08/14/2024 3.3  0.0 - 8.0 % Final    Basophil % 08/14/2024 0.6  0.0 - 1.9 % Final    Differential Method 08/14/2024 Automated   Final    Sodium 08/14/2024 138  136 - 145 mmol/L Final    Potassium 08/14/2024 4.3  3.5 - 5.1 mmol/L Final    Chloride 08/14/2024 103  95 - 110 mmol/L Final    CO2 08/14/2024 23  23 - 29 mmol/L Final    Glucose 08/14/2024 96  70 - 110 mg/dL Final    BUN 08/14/2024 13  8 - 23 mg/dL Final    Creatinine 08/14/2024 0.8  0.5 - 1.4 mg/dL Final    Calcium 08/14/2024 9.3  8.7 - 10.5 mg/dL Final    Total Protein 08/14/2024 7.0  6.0 - 8.4 g/dL Final    Albumin 08/14/2024 4.0  3.5 - 5.2 g/dL Final    Total Bilirubin 08/14/2024 0.7  0.1 - 1.0 mg/dL Final    Alkaline Phosphatase 08/14/2024 36 (L)  55 - 135 U/L Final    AST 08/14/2024 19  10 - 40 U/L Final    ALT 08/14/2024 18  10 - 44 U/L Final    eGFR 08/14/2024 >60.0  >60 mL/min/1.73 m^2 Final    Anion Gap 08/14/2024 12  8 - 16 mmol/L Final    Testosterone, Total 08/14/2024 979  304 - 1227 ng/dL Final     Imaging:  No results found. However, due to the size of the patient record, not all encounters were searched. Please check Results  Review for a complete set of results.    Note: I have independently reviewed any/all imaging/labs/tests and agree with the report (s) as documented.  Any discrepancies will be as noted/demarcated by free text.  GARO BERGER 2/4/2025    ASSESSMENT:  1. Chronic migraine without aura, with intractable migraine, so stated, with status migrainosus    2. Migraine without status migrainosus, not intractable, unspecified migraine type      PLAN:  - Botox administered in clinic for Chronic Migraine (see below)   - D/C ajovy 225 mg SQ monthly   - For acute migraine - ubrelvy 100 mg   - Triptans contraindicated given daily MAOI transdermal patch as prescribed by PCP for depression   - For rescue - has fioricet available  - refills provided  - Continue robaxin 750 mg to take up to three times daily as needed for muscle spasm  - RTC in 12 weeks for repeat Botox injections or sooner if needed          All questions and concerns addressed.  Patient verbalizes understanding and is agreeable with the above stated treatment plan.      PROCEDURE NOTE:  BOTOX was performed as an indicated therapy for intractable chronic migraine headaches given that the patient failed more than 2 headache medications    A time out was conducted just before the start of the procedure to verify the correct patient and procedure, procedure location, and all relevant critical information.     The Botox injections have achieved well over 50%  improvement in the patient's symptoms. Migraines have been reduced at least 7 days per month and the number of cumulative hours suffering with headaches has been reduced at least 100 total hours per month. Today she does have a headache indicating that the Botox has worn off. Frequency of treatment is every 12 weeks unless no response to the treatments, at which time we will discontinue the injections.    PROCEDURE PERFORMED: Botulinum toxin injection (88539)  CLINICAL INDICATION: G43.719  After risks and benefits were  explained including bleeding, infection, worsening of pain, damage to the areas being injected, weakness of muscles, loss of muscle control, dysphagia if injecting the head or neck, facial droop if injecting the facial area, painful injection, allergic or other reaction to the medications being injected, and the failure of the procedure to help the problem, a signed consent was obtained.   The patient was placed in a comfortable area and the sites to be treated were identified.The area to be treated was prepped three times with alcohol and the alcohol allowed to dry. Next, a 30 gauge needle was used to inject the medication in the area to be treated.      Total Botox used: 155 Units   Unavoidable waste: 45 Units     Injection sites:    muscle bilaterally ( a total of 10 units divided into 2 sites)   Procerus muscle (5 units)   Frontalis muscle bilaterally (a total of 20 units divided into 4 sites)   Temporalis muscle bilaterally (a total of 40 units divided into 8 sites)   Occipitalis muscle bilaterally (a total of 30 units divided into 6 sites)   Cervical paraspinal muscles (a total of 20 units divided into 4 sites)   Trapezius muscle bilaterally (a total of 30 units divided into 6 sites)   Complications: none   RTC for the next Botox injection: 12 weeks     CC: Haseeb Puentes MD Elizabeth C Vulevich, FNP-JESSICA  [facility] Department of Neurology   [phone]

## 2025-02-04 NOTE — TELEPHONE ENCOUNTER
We received an approval letter on 01/31/2026 for EMSAM but the co-pay exception was denied based on Medicare part D standard tier exception because this is considered a specialty drug.   Pharmacy explained the med is covered on Tier 5 level - copay is still 734.00.

## 2025-02-04 NOTE — TELEPHONE ENCOUNTER
He cannot that I am aware of as he has tried numerous meds over the years that were either not affective or had side effects.

## 2025-02-05 ENCOUNTER — CLINICAL SUPPORT (OUTPATIENT)
Dept: REHABILITATION | Facility: HOSPITAL | Age: 75
End: 2025-02-05
Payer: MEDICARE

## 2025-02-05 DIAGNOSIS — G89.29 CHRONIC BILATERAL LOW BACK PAIN WITH BILATERAL SCIATICA: ICD-10-CM

## 2025-02-05 DIAGNOSIS — M54.42 CHRONIC BILATERAL LOW BACK PAIN WITH BILATERAL SCIATICA: ICD-10-CM

## 2025-02-05 DIAGNOSIS — M21.372 FOOT DROP, BILATERAL: Primary | ICD-10-CM

## 2025-02-05 DIAGNOSIS — M21.371 FOOT DROP, BILATERAL: Primary | ICD-10-CM

## 2025-02-05 DIAGNOSIS — M54.41 CHRONIC BILATERAL LOW BACK PAIN WITH BILATERAL SCIATICA: ICD-10-CM

## 2025-02-05 PROCEDURE — 97112 NEUROMUSCULAR REEDUCATION: CPT

## 2025-02-05 PROCEDURE — 97110 THERAPEUTIC EXERCISES: CPT

## 2025-02-05 NOTE — PROGRESS NOTES
Outpatient Rehab    Physical Therapy Visit    Patient Name: Raffy Rutherford Jr.  MRN: [id number]  YOB: 1950  Today's Date: 2/5/2025    Therapy Diagnosis:   Encounter Diagnoses   Name Primary?    Foot drop, bilateral Yes    Chronic bilateral low back pain with bilateral sciatica      Physician: Lisandra Trinh NP    Physician Orders: Eval and Treat  Medical Diagnosis: Foot drop, right foot [M21.371], Foot drop, left foot [M21.372], Radiculopathy, site unspecified [M54.10]     Visit # / Visits Authorized:  1 / 20   Date of Evaluation:  2/3/2025  Insurance Authorization Period: 2/3/2025 to 2/3/2026  Plan of Care Certification:  2/4/2025 to 4/14/2025      Time In: 0900   Time Out: 0957  Total Time: 57 minutes  Total Billable Time: 57 minutes         Subjective   Patient reports no new pain. Patient presents with different generic AFOs donned..  Pain reported as 7/10.      Objective            Treatment:  Therapeutic Exercise  Therapeutic Exercise Activity 1: Recumbent bike for ankle range of motion: 8 minutes, Level 2    Balance/Neuromuscular Re-Education  Balance/Neuromuscular Re-Education Activity 1: long sitting calf stretch: 10 second hold, 5 times each leg  Balance/Neuromuscular Re-Education Activity 2: slant board: 3 x 30 sec stretch each leg  Balance/Neuromuscular Re-Education Activity 3: seated dorsiflexion (feet on ground): 2 x 10 reps, 10 second hold  Balance/Neuromuscular Re-Education Activity 4: seated plantarflexion (feet on ground): 2 x 10 reps, 10 second hold  Balance/Neuromuscular Re-Education Activity 5: standing hip abduction: 2 x 10 reps, 5 second hold    Patient's spiritual, cultural, and educational needs considered and patient agreeable to plan of care and goals.     Assessment & Plan   Assessment: Raffy presented to physical today with no new reports of pain since initial visit. He has AFOs donned. Reports compliance with his home exercise program. He tolerated today's session  well with emphasis on increasing dorsiflexion range of motion and initiating lower leg muscle activation. Patient reports that he wants to go skiing at the end of March. Will continue to progress as tolerated.           Plan: Continue plan of care toward patient goals.    Goals:   Active       Changing body position       Patient will demonstrate moving sit to stand without upper extremity support for independence with transfers.  (Progressing)       Start:  02/04/25    Expected End:  04/14/25               Functional outcome       Patient will show a significant change in FOTO patient-reported outcome tool to demonstrate subjective improvement (Progressing)       Start:  02/04/25    Expected End:  04/14/25            Patient will demonstrate independence in home program for support of progression (Progressing)       Start:  02/04/25    Expected End:  03/14/25               Pain       Patient will report a 2 point reduction in pain while performing ambulation (Progressing)       Start:  02/04/25    Expected End:  03/14/25               Strength       Patient will achieve bilateral knee extension strength of 4/5 (Progressing)       Start:  02/04/25    Expected End:  04/14/25            Patient will achieve bilateral ankle dorsiflexion strength of 4/5 (Progressing)       Start:  02/04/25    Expected End:  04/14/25                Mariel Oscar, PT, DPT

## 2025-02-10 ENCOUNTER — PATIENT MESSAGE (OUTPATIENT)
Dept: ORTHOPEDICS | Facility: CLINIC | Age: 75
End: 2025-02-10
Payer: MEDICARE

## 2025-02-10 ENCOUNTER — PATIENT MESSAGE (OUTPATIENT)
Dept: REHABILITATION | Facility: HOSPITAL | Age: 75
End: 2025-02-10
Payer: MEDICARE

## 2025-02-10 ENCOUNTER — TELEPHONE (OUTPATIENT)
Dept: UROLOGY | Facility: CLINIC | Age: 75
End: 2025-02-10
Payer: MEDICARE

## 2025-02-10 DIAGNOSIS — M54.16 LUMBAR RADICULOPATHY, CHRONIC: Primary | ICD-10-CM

## 2025-02-10 NOTE — TELEPHONE ENCOUNTER
Called spouse & spoke with er re: procedure. LM with spouse re: instructions  She verbalized understanding

## 2025-02-10 NOTE — TELEPHONE ENCOUNTER
LMOR re: confirming procedure appt for tomorrow.   Discussed location, arrival time for 9:15 & instructions re: enema & ABX & no blood thinners.

## 2025-02-11 ENCOUNTER — PATIENT MESSAGE (OUTPATIENT)
Dept: REHABILITATION | Facility: HOSPITAL | Age: 75
End: 2025-02-11
Payer: MEDICARE

## 2025-02-11 ENCOUNTER — PROCEDURE VISIT (OUTPATIENT)
Dept: UROLOGY | Facility: CLINIC | Age: 75
End: 2025-02-11
Payer: MEDICARE

## 2025-02-11 VITALS
TEMPERATURE: 97 F | BODY MASS INDEX: 27.37 KG/M2 | DIASTOLIC BLOOD PRESSURE: 70 MMHG | RESPIRATION RATE: 16 BRPM | HEART RATE: 69 BPM | WEIGHT: 180 LBS | SYSTOLIC BLOOD PRESSURE: 122 MMHG

## 2025-02-11 DIAGNOSIS — C61 PROSTATE CANCER: Primary | ICD-10-CM

## 2025-02-11 RX ORDER — CEFTRIAXONE 1 G/1
1 INJECTION, POWDER, FOR SOLUTION INTRAMUSCULAR; INTRAVENOUS
Status: COMPLETED | OUTPATIENT
Start: 2025-02-11 | End: 2025-02-11

## 2025-02-11 RX ORDER — LIDOCAINE HYDROCHLORIDE 10 MG/ML
20 INJECTION, SOLUTION INFILTRATION; PERINEURAL
Status: COMPLETED | OUTPATIENT
Start: 2025-02-11 | End: 2025-02-11

## 2025-02-11 RX ORDER — LIDOCAINE HYDROCHLORIDE 20 MG/ML
JELLY TOPICAL
Status: COMPLETED | OUTPATIENT
Start: 2025-02-11 | End: 2025-02-11

## 2025-02-11 RX ADMIN — LIDOCAINE HYDROCHLORIDE 20 ML: 10 INJECTION, SOLUTION INFILTRATION; PERINEURAL at 09:02

## 2025-02-11 RX ADMIN — CEFTRIAXONE 1 G: 1 INJECTION, POWDER, FOR SOLUTION INTRAMUSCULAR; INTRAVENOUS at 09:02

## 2025-02-11 RX ADMIN — LIDOCAINE HYDROCHLORIDE: 20 JELLY TOPICAL at 09:02

## 2025-02-11 NOTE — PROCEDURES
"Transrectal Ultrasound w/ Biopsy    Date/Time: 2/11/2025 9:46 AM    Performed by: Don Cheng MD  Authorized by: Don Cheng MD    Consent Done?:  Yes (Written)  Time out: Immediately prior to procedure a "time out" was called to verify the correct patient, procedure, equipment, support staff and site/side marked as required.    Indications: Prostate Cancer and Elevated PSA    Preparation: Patient was prepped and draped in usual sterile fashion    Position:  Left lateral  Anesthesia:  Pudendal nerve block, 20cc's 1% Lidocaine and Lidocaine jelly  Patient sedated: No    Prostate Size:  45 ccs  Left Base Biopsies: 2  Left Mid Biopsies: 2  Left Catharpin Biopsies: 2  Right Base Biopsies: 2  Right Mid Biopsies: 5  Right Catharpin Biopsies: 5  Total Biopsies:  18    Patient tolerance:  Patient tolerated the procedure well with no immediate complications      The risks and benefits of the procedure were explained to the patient and consent was obtained.  The patient laid in the lateral decubitus position.    The ultrasound probe was advanced into the rectum. The prostate was visualized.    20cc of 1% lidocaine without epi was used for a prostatic nerve block.    At this point, a sweep of the prostate was performed from base to apex in the transverse plane using the ultrasound and URONAV platform.  Landmarks were then labeled with anterior, posterior, right, left, base and apex were labeled in the axial as well as sagittal planes.  Segmentation was then performed and manual adjustments were made as needed starting in the sagittal plane followed by the axial plane.  At this point, alignment of the ultrasound with MRI images was ensured using the toggle between ultrasound and MRI.     At this point elastic deformation was computed.  Our images were satisfactory.    Target 1 -right apex 5 cores ( 3 targeted)  Target 2 - right middle- 5 cores (3 targeted).  "

## 2025-02-11 NOTE — PATIENT INSTRUCTIONS
What to Expect After a Prostate Biopsy    You may have mild bleeding from the rectum or urine for about 1 week to 1 month, or in your ejaculate for several months. This bleeding is normal and expected, and it will stop. You may have mild discomfort in your rectal or urethral area for 24-48 hours.    You cannot do any strenuous lifting, straining, or exercising for 24 hours. You may return to full activity the day after the biopsy.    You may continue to take all your regular medications after the procedure except for the blood thinners.    You may resume all blood-thinning medications once you no longer see any bleeding or whenever your physician prescribing the medication says it is all right to do so. You may take Tylenol if you have a fever and your temperature is less than 100° F or if you have some discomfort.    You will receive a call from the Urology Department at [facility] with the results of your prostate biopsy within one week.    Signs and Symptoms to Report    Call your [facility] urologist at [phone] if you develop any of the following:  Temperature greater than 101°  F  Inability to urinate  A large amount of bleeding from the rectum or in the urine  Persistent or severe pain    After hours or on weekends, you may reach a urology resident on call at this number: [phone].

## 2025-02-12 ENCOUNTER — PATIENT MESSAGE (OUTPATIENT)
Dept: REHABILITATION | Facility: HOSPITAL | Age: 75
End: 2025-02-12
Payer: MEDICARE

## 2025-02-12 ENCOUNTER — HOSPITAL ENCOUNTER (OUTPATIENT)
Dept: RADIOLOGY | Facility: HOSPITAL | Age: 75
Discharge: HOME | End: 2025-02-12
Attending: NURSE PRACTITIONER
Payer: MEDICARE

## 2025-02-12 ENCOUNTER — PATIENT MESSAGE (OUTPATIENT)
Dept: ORTHOPEDICS | Facility: CLINIC | Age: 75
End: 2025-02-12
Payer: MEDICARE

## 2025-02-12 DIAGNOSIS — M54.16 LUMBAR RADICULOPATHY, CHRONIC: ICD-10-CM

## 2025-02-12 PROCEDURE — 72148 MRI LUMBAR SPINE W/O DYE: CPT | Mod: 26,,, | Performed by: RADIOLOGY

## 2025-02-12 PROCEDURE — 72148 MRI LUMBAR SPINE W/O DYE: CPT | Mod: TC

## 2025-02-13 ENCOUNTER — PATIENT MESSAGE (OUTPATIENT)
Dept: NEUROSURGERY | Facility: CLINIC | Age: 75
End: 2025-02-13
Payer: MEDICARE

## 2025-02-13 DIAGNOSIS — Z98.1 ARTHRODESIS STATUS: ICD-10-CM

## 2025-02-13 DIAGNOSIS — M25.551 PAIN OF RIGHT HIP: Primary | ICD-10-CM

## 2025-02-14 ENCOUNTER — PATIENT MESSAGE (OUTPATIENT)
Dept: REHABILITATION | Facility: HOSPITAL | Age: 75
End: 2025-02-14
Payer: MEDICARE

## 2025-02-18 ENCOUNTER — PATIENT MESSAGE (OUTPATIENT)
Dept: RADIATION ONCOLOGY | Facility: CLINIC | Age: 75
End: 2025-02-18
Payer: MEDICARE

## 2025-02-18 ENCOUNTER — TELEPHONE (OUTPATIENT)
Dept: UROLOGY | Facility: CLINIC | Age: 75
End: 2025-02-18
Payer: MEDICARE

## 2025-02-18 DIAGNOSIS — C61 PROSTATE CANCER: Primary | ICD-10-CM

## 2025-02-19 ENCOUNTER — TELEPHONE (OUTPATIENT)
Dept: UROLOGY | Facility: CLINIC | Age: 75
End: 2025-02-19
Payer: MEDICARE

## 2025-02-19 ENCOUNTER — HOSPITAL ENCOUNTER (OUTPATIENT)
Dept: RADIOLOGY | Facility: HOSPITAL | Age: 75
Discharge: HOME | End: 2025-02-19
Attending: NEUROLOGICAL SURGERY
Payer: MEDICARE

## 2025-02-19 DIAGNOSIS — C61 PROSTATE CANCER: Primary | ICD-10-CM

## 2025-02-19 DIAGNOSIS — Z98.1 ARTHRODESIS STATUS: ICD-10-CM

## 2025-02-19 DIAGNOSIS — M25.551 PAIN OF RIGHT HIP: ICD-10-CM

## 2025-02-19 PROCEDURE — 72192 CT PELVIS W/O DYE: CPT | Mod: TC

## 2025-02-20 ENCOUNTER — PATIENT MESSAGE (OUTPATIENT)
Dept: INTERNAL MEDICINE | Facility: CLINIC | Age: 75
End: 2025-02-20
Payer: MEDICARE

## 2025-02-20 DIAGNOSIS — F33.1 MODERATE EPISODE OF RECURRENT MAJOR DEPRESSIVE DISORDER: ICD-10-CM

## 2025-02-21 NOTE — TELEPHONE ENCOUNTER
Care Due:                  Date            Visit Type   Department     Provider  --------------------------------------------------------------------------------                                MYCHART                              ANNUAL                              CHECKUP/PHY  [facility] INTERNAL  Last Visit: 08-      [facility]       Haseeb Puentes  Next Visit: None Scheduled  None         None Found                                                            Last  Test          Frequency    Reason                     Performed    Due Date  --------------------------------------------------------------------------------    Mg Level....  12 months..  alendronate..............  Not Found    Overdue    Phosphate...  12 months..  alendronate..............  Not Found    Overdue    [employer] Embedded Care Due Messages. Reference number: [id number].   2/21/2025 11:37:10 AM CST

## 2025-02-21 NOTE — TELEPHONE ENCOUNTER
Patient requesting refill and decrease in dosage  EMSAM 9 mg  NORCO 5 mg  Pt's  LOV with Haseeb Puentes MD , 8/8/2024  Medication pending    Emsam with lower dosage not pended  Message dated 2/4/25:  I will not remember that if it needs to be done in March. . Can they let me know when we get closer or need the actual refill.     Patient also asking for a decrease of NORCO from 7.5 mg to 5 mg.

## 2025-02-23 RX ORDER — HYDROCODONE BITARTRATE AND ACETAMINOPHEN 5; 325 MG/1; MG/1
1 TABLET ORAL EVERY 8 HOURS PRN
Qty: 90 TABLET | Refills: 0 | Status: SHIPPED | OUTPATIENT
Start: 2025-02-23

## 2025-02-24 ENCOUNTER — HOSPITAL ENCOUNTER (OUTPATIENT)
Facility: HOSPITAL | Age: 75
Discharge: HOME | End: 2025-02-24
Attending: INTERNAL MEDICINE | Admitting: INTERNAL MEDICINE
Payer: MEDICARE

## 2025-02-24 VITALS
SYSTOLIC BLOOD PRESSURE: 113 MMHG | WEIGHT: 172.38 LBS | OXYGEN SATURATION: 94 % | HEART RATE: 59 BPM | RESPIRATION RATE: 14 BRPM | HEIGHT: 68 IN | BODY MASS INDEX: 26.13 KG/M2 | DIASTOLIC BLOOD PRESSURE: 63 MMHG | TEMPERATURE: 98 F

## 2025-02-24 DIAGNOSIS — K21.9 GASTROESOPHAGEAL REFLUX DISEASE, UNSPECIFIED WHETHER ESOPHAGITIS PRESENT: Primary | ICD-10-CM

## 2025-02-24 DIAGNOSIS — K21.9 GERD (GASTROESOPHAGEAL REFLUX DISEASE): ICD-10-CM

## 2025-02-24 DIAGNOSIS — D36.9 ADENOMATOUS POLYP: ICD-10-CM

## 2025-02-24 PROCEDURE — 43235 EGD DIAGNOSTIC BRUSH WASH: CPT | Mod: 51,,, | Performed by: INTERNAL MEDICINE

## 2025-02-24 PROCEDURE — 63600175 PHARM REV CODE 636 W HCPCS: Performed by: NURSE ANESTHETIST, CERTIFIED REGISTERED

## 2025-02-24 PROCEDURE — 37000008 HC ANESTHESIA 1ST 15 MINUTES: Performed by: INTERNAL MEDICINE

## 2025-02-24 PROCEDURE — E9220 PRA ENDO ANESTHESIA: HCPCS | Mod: ,,, | Performed by: NURSE ANESTHETIST, CERTIFIED REGISTERED

## 2025-02-24 PROCEDURE — 25000003 PHARM REV CODE 250: Performed by: NURSE ANESTHETIST, CERTIFIED REGISTERED

## 2025-02-24 PROCEDURE — 45378 DIAGNOSTIC COLONOSCOPY: CPT | Performed by: INTERNAL MEDICINE

## 2025-02-24 PROCEDURE — 43235 EGD DIAGNOSTIC BRUSH WASH: CPT | Performed by: INTERNAL MEDICINE

## 2025-02-24 PROCEDURE — 37000009 HC ANESTHESIA EA ADD 15 MINS: Performed by: INTERNAL MEDICINE

## 2025-02-24 PROCEDURE — 45378 DIAGNOSTIC COLONOSCOPY: CPT | Mod: ,,, | Performed by: INTERNAL MEDICINE

## 2025-02-24 RX ORDER — PROPOFOL 10 MG/ML
INJECTION, EMULSION INTRAVENOUS
Status: DISCONTINUED | OUTPATIENT
Start: 2025-02-24 | End: 2025-02-24

## 2025-02-24 RX ORDER — PHENYLEPHRINE HYDROCHLORIDE 10 MG/ML
INJECTION INTRAVENOUS
Status: DISCONTINUED | OUTPATIENT
Start: 2025-02-24 | End: 2025-02-24

## 2025-02-24 RX ORDER — SODIUM CHLORIDE 9 MG/ML
INJECTION, SOLUTION INTRAVENOUS CONTINUOUS
Status: DISCONTINUED | OUTPATIENT
Start: 2025-02-24 | End: 2025-02-24 | Stop reason: HOSPADM

## 2025-02-24 RX ORDER — LIDOCAINE HYDROCHLORIDE 20 MG/ML
INJECTION INTRAVENOUS
Status: DISCONTINUED | OUTPATIENT
Start: 2025-02-24 | End: 2025-02-24

## 2025-02-24 RX ADMIN — PROPOFOL 100 MG: 10 INJECTION, EMULSION INTRAVENOUS at 08:02

## 2025-02-24 RX ADMIN — PROPOFOL 150 MCG/KG/MIN: 10 INJECTION, EMULSION INTRAVENOUS at 08:02

## 2025-02-24 RX ADMIN — LIDOCAINE HYDROCHLORIDE 75 MG: 20 INJECTION INTRAVENOUS at 08:02

## 2025-02-24 RX ADMIN — SODIUM CHLORIDE: 0.9 INJECTION, SOLUTION INTRAVENOUS at 08:02

## 2025-02-24 RX ADMIN — GLYCOPYRROLATE 0.2 MG: 0.2 INJECTION, SOLUTION INTRAMUSCULAR; INTRAVENOUS at 08:02

## 2025-02-24 RX ADMIN — PHENYLEPHRINE HYDROCHLORIDE 200 MCG: 10 INJECTION INTRAVENOUS at 08:02

## 2025-02-24 NOTE — H&P
Short Stay Endoscopy History and Physical    PCP - Haseeb Puentes MD    Procedure - EGD/Colonoscopy  ASA - per anesthesia  Mallampati - per anesthesia  History of Anesthesia problems - no  Family history Anesthesia problems -  no   Plan of anesthesia - MAC    HPI:  This is a 74 y.o. male here for evaluation of :     EGD - gerd  Colon - fam hx    ROS:  Constitutional: No fevers, chills, No weight loss  CV: No chest pain  Pulm: No cough, No shortness of breath  Ophtho: No vision changes  GI: see HPI  Derm: No rash    Medical History:  has a past medical history of Allergy, Arthritis, Back pain, Cataract, Chronic pain, Cluster headache (2013), Colon polyps (2007), Degenerative disc disease, Depression, Diverticulitis (12/2013), Dry eye syndrome, Fibromyalgia (2013), GERD (gastroesophageal reflux disease), Hepatitis (1970's), History of prostate biopsy (2002), Hyperlipidemia, Joint pain, Prostate cancer (07/2021), PVD (posterior vitreous detachment), Salzmann's nodular dystrophy of left eye, Sleep apnea, Thyroid nodule (07/16/2014), Trigeminal neuralgia of left side of face, Tubular adenoma of colon (2007), and Visual disturbance (2012).    Surgical History:  has a past surgical history that includes Knee surgery; Hemorrhoid surgery; Cholecystectomy; Sinus surgery; Eye surgery; Back surgery; Joint replacement; Total hip arthroplasty (04/2012); Cosmetic surgery (02/10/2015); Cosmetic surgery (02/10/2015); Cataract extraction w/  intraocular lens implant (Bilateral); Spinal fusion (06/22/2015); Injection of steroid (Right, 12/06/2018); Injection of steroid (Right, 09/19/2019); Esophagogastroduodenoscopy (N/A, 12/17/2019); Colonoscopy (N/A, 12/17/2019); Cystoscopy with ureteroscopy, retrograde pyelography, and insertion of stent (Left, 08/19/2020); Fusion of lumbar spine by anterior approach (N/A, 08/20/2020); Ulnar nerve transposition (Left, 12/16/2020); Carpal tunnel release (Right, 05/18/2021); Colonoscopy (2007);  Repair of extensor tendon (Left, 04/07/2022); Irrigation and debridement of upper extremity (Left, 04/07/2022); Arthroscopic debridement of shoulder (Left, 04/19/2024); Arthroscopic repair of rotator cuff of shoulder (Left, 04/19/2024); arthroscopy,shoulder,with biceps tenodesis (Left, 04/19/2024); Arthroscopy of shoulder with decompression of subacromial space (Left, 04/19/2024); and Shoulder surgery.    Family History: family history includes Arthritis in his sister; Cancer in his maternal cousin; Colon cancer in his mother; Crohn's disease in his sister; Diabetes in his brother; Heart attack in his maternal grandfather; Irritable bowel syndrome in his son; Jaundice in his grandchild; Macular degeneration in his brother; Migraines in his sister; Neurological Disorders in his son; Other in his brother, maternal uncle, and maternal uncle; Other (age of onset: 44) in his father; Pancreatitis in his brother; Stroke (age of onset: 86) in his mother; Tremor in his daughter and son; Uterine cancer (age of onset: 77) in his mother.. O    Social History:  reports that he has never smoked. He has never used smokeless tobacco. He reports current alcohol use of about 5.0 standard drinks of alcohol per week. He reports that he does not use drugs.    Review of patient's allergies indicates:   Allergen Reactions    Alphagan [brimonidine]      Patient taking MASO-B Selective Inhibitor Selegiline (Emsam)    Coumadin [warfarin]      itch    Oxycodone      hiccups       Medications:   Prescriptions Prior to Admission[1]    Physical Exam:    Vital Signs: There were no vitals filed for this visit.    General Appearance: Well appearing in no acute distress  Eyes:    No scleral icterus  ENT: Neck supple, Lips, mucosa, and tongue normal; teeth and gums normal  Abdomen: Soft, non tender, non distended with normal bowel sounds. No hepatosplenomegaly, ascites, or mass.  Extremities: No edema  Skin: No rash    Labs:  Lab Results   Component  Value Date    WBC 6.41 08/14/2024    HGB 14.8 08/14/2024    HCT 46.8 08/14/2024     08/14/2024    CHOL 195 08/14/2024    TRIG 125 08/14/2024    HDL 60 08/14/2024    ALT 18 08/14/2024    AST 19 08/14/2024     08/14/2024    K 4.3 08/14/2024     08/14/2024    CREATININE 0.8 08/14/2024    BUN 13 08/14/2024    CO2 23 08/14/2024    TSH 0.869 08/14/2024    PSA 6.3 (H) 10/31/2019    INR 1.0 08/03/2020    HGBA1C 5.5 08/14/2024       I have explained the risks and benefits of endoscopy procedures to the patient including but not limited to bleeding, perforation, infection, and death.  The patient was asked if they understand and allowed to ask any further questions to their satisfaction.    Durga Harvey MD         [1]   Facility-Administered Medications Prior to Admission   Medication Dose Route Frequency Provider Last Rate Last Admin    onabotulinumtoxina injection 200 Units  200 Units Intramuscular q12 weeks Macie Pearson FNP   200 Units at 02/04/25 1252     Medications Prior to Admission   Medication Sig Dispense Refill Last Dose/Taking    alendronate (FOSAMAX) 70 MG tablet Take 1 tablet (70 mg total) by mouth every 7 days. Take with a full glass of water weekly. 4 tablet 11     atorvastatin (LIPITOR) 40 MG tablet Take 1 tablet (40 mg total) by mouth once daily. 90 tablet 1     butalbital-acetaminophen-caffeine -40 mg (FIORICET, ESGIC) -40 mg per tablet Take 1 tablet by mouth daily as needed for 30 days. 14 tablet 0     celecoxib (CELEBREX) 100 MG capsule Take 2 capsules (200 mg total) by mouth 2 (two) times daily. 180 capsule 3     clindamycin (CLEOCIN) 150 MG capsule Take 4 capsules 1 hour prior to dental appointment 12 capsule 1     clonazePAM (KLONOPIN) 0.5 MG tablet Take 1 tablet by mouth twice a day as needed for anxiety 60 tablet 3     cycloSPORINE (VEVYE) 0.1 % Drop Place 1 drop into both eyes 2 (two) times a day. 2 mL 11     dicyclomine (BENTYL) 10 MG capsule Take 1 capsule (10  mg total) by mouth 3 (three) times daily as needed (abdominal cramping). 30 capsule 2     ergocalciferol (VITAMIN D2) 50,000 unit Cap Take 1 capsule (50,000 Units total) by mouth every 7 days. 12 capsule 3     fremanezumab-vfrm (AJOVY SYRINGE) 225 mg/1.5 mL injection Inject 1.5 mLs (225 mg total) into the skin every 28 days. 1.5 mL 5     HYDROcodone-acetaminophen (NORCO) 5-325 mg per tablet Take 1 tablet by mouth every 8 (eight) hours as needed for Pain. 90 tablet 0     HYDROcodone-acetaminophen (NORCO) 7.5-325 mg per tablet Take 1 tablet by mouth 4 times a day as needed for chronic pain 120 tablet 0     HYDROcodone-acetaminophen (NORCO) 7.5-325 mg per tablet Take 1 tablet by mouth 4 (four) times daily as needed. 120 tablet 0     hydrocortisone-pramoxine (ANALPRAM-HC) 2.5-1 % Crea Place rectally 3 (three) times daily. 30 g 2     ketoconazole (NIZORAL) 2 % cream Apply to affected areas of body folds twice daily as needed for irritation. 60 g 5     lamoTRIgine (LAMICTAL) 200 MG tablet Take 1 tablet (200 mg total) by mouth once daily. 90 tablet 3     methocarbamoL (ROBAXIN) 750 MG Tab Take 1 tablet (750 mg total) by mouth 3 (three) times daily. 60 tablet 2     perfluorohexyloctane, PF, (MIEBO, PF,) 100 % Drop Place 1 drop into both eyes 4 (four) times daily. 3 mL 11     pregabalin (LYRICA) 25 MG capsule Take 2 capsules (50 mg total) by mouth once daily. 90 capsule 1     pregabalin (LYRICA) 25 MG capsule Take 1 capsule (25 mg total) by mouth 4 (four) times daily. 360 capsule 0     pregabalin (LYRICA) 75 MG capsule Take 3 capsules (225 mg total) by mouth daily at night 270 capsule 0     RABEprazole (ACIPHEX) 20 mg tablet Take 1 tablet (20 mg total) by mouth 2 (two) times daily. 180 tablet 1     selegiline (EMSAM) 12 mg/24 hr Place 1 patch onto the skin once daily. 30 patch 11     traZODone (DESYREL) 100 MG tablet Take 1 tablet (100 mg total) by mouth every evening. 90 tablet 6     triamcinolone acetonide 0.025% (KENALOG)  0.025 % cream Apply to affected areas of body folds twice daily as needed for irritation. Do not use for longer than 2 weeks in a row. 30 g 2     ubrogepant (UBRELVY) 100 mg tablet Take 1 tablet (100 mg total) by mouth every 2 (two) hours as needed for Migraine. Max 200 mg/day. 16 tablet 5     ubrogepant (UBRELVY) 100 mg tablet Take 1 tablet (100 mg total) by mouth every 2 (two) hours as needed for Migraine. Max 200 mg/day. 16 tablet 5

## 2025-02-24 NOTE — [employer] PATIENT INSTRUCTIONS
Discharge Summary/Instructions after an Endoscopic Procedure  Patient Name: Raffy Rutherford  Patient MRN: [id number]  Patient YOB: 1950 Monday, February 24, 2025  Durga Harvey MD  Dear patient,  As a result of recent federal legislation (The Federal Cures Act), you may   receive lab or pathology results from your procedure in your [facility]   account before your physician is able to contact you. Your physician or   their representative will relay the results to you with their   recommendations at their soonest availability.  Thank you,  RESTRICTIONS:  During your procedure today, you received medications for sedation.  These   medications may affect your judgment, balance and coordination.  Therefore,   for 24 hours, you have the following restrictions:   - DO NOT drive a car, operate machinery, make legal/financial decisions,   sign important papers or drink alcohol.    ACTIVITY:  Today: no heavy lifting, straining or running due to procedural   sedation/anesthesia.  The following day: return to full activity including work.  DIET:  Eat and drink normally unless instructed otherwise.     TREATMENT FOR COMMON SIDE EFFECTS:  - Mild abdominal pain, nausea, belching, bloating or excessive gas:  rest,   eat lightly and use a heating pad.  - Sore Throat: treat with throat lozenges and/or gargle with warm salt   water.  - Because air was used during the procedure, expelling large amounts of air   from your rectum or belching is normal.  - If a bowel prep was taken, you may not have a bowel movement for 1-3 days.    This is normal.  SYMPTOMS TO WATCH FOR AND REPORT TO YOUR PHYSICIAN:  1. Abdominal pain or bloating, other than gas cramps.  2. Chest pain.  3. Back pain.  4. Signs of infection such as: chills or fever occurring within 24 hours   after the procedure.  5. Rectal bleeding, which would show as bright red, maroon, or black stools.   (A tablespoon of blood from the rectum is not serious, especially if    hemorrhoids are present.)  6. Vomiting.  7. Weakness or dizziness.  GO DIRECTLY TO THE NEAREST EMERGENCY ROOM IF YOU HAVE ANY OF THE FOLLOWING:      Difficulty breathing              Chills and/or fever over 101 F   Persistent vomiting and/or vomiting blood   Severe abdominal pain   Severe chest pain   Black, tarry stools   Bleeding- more than one tablespoon   Any other symptom or condition that you feel may need urgent attention  Your doctor recommends these additional instructions:  If any biopsies were taken, your doctors clinic will contact you in 1 to 2   weeks with any results.  - Discharge patient to home (ambulatory).   - Patient has a contact number available for emergencies.  The signs and   symptoms of potential delayed complications were discussed with the   patient.  Return to normal activities tomorrow.  Written discharge   instructions were provided to the patient.   - Resume previous diet.   - Continue present medications.   - Return to primary care physician as previously scheduled.   - Repeat colonoscopy in 5 years for surveillance.  For questions, problems or results please call your physician - Durga Harvey MD at Work:  [phone].  [facility] [place], EMERGENCY ROOM PHONE NUMBER: [phone]  IF A COMPLICATION OR EMERGENCY SITUATION ARISES AND YOU ARE UNABLE TO REACH   YOUR PHYSICIAN - GO DIRECTLY TO THE EMERGENCY ROOM.  Durga Harvey MD  2/24/2025 8:45:58 AM  This report has been verified and signed electronically.  Dear patient,  As a result of recent federal legislation (The Federal Cures Act), you may   receive lab or pathology results from your procedure in your [facility]   account before your physician is able to contact you. Your physician or   their representative will relay the results to you with their   recommendations at their soonest availability.  Thank you,  [employer]ATION

## 2025-02-24 NOTE — [employer]ATION PATIENT INSTRUCTIONS
Discharge Summary/Instructions after an Endoscopic Procedure  Patient Name: Raffy Rutherford  Patient MRN: [id number]  Patient YOB: 1950 Monday, February 24, 2025  Durga Harvey MD  Dear patient,  As a result of recent federal legislation (The Federal Cures Act), you may   receive lab or pathology results from your procedure in your [facility]   account before your physician is able to contact you. Your physician or   their representative will relay the results to you with their   recommendations at their soonest availability.  Thank you,  RESTRICTIONS:  During your procedure today, you received medications for sedation.  These   medications may affect your judgment, balance and coordination.  Therefore,   for 24 hours, you have the following restrictions:   - DO NOT drive a car, operate machinery, make legal/financial decisions,   sign important papers or drink alcohol.    ACTIVITY:  Today: no heavy lifting, straining or running due to procedural   sedation/anesthesia.  The following day: return to full activity including work.  DIET:  Eat and drink normally unless instructed otherwise.     TREATMENT FOR COMMON SIDE EFFECTS:  - Mild abdominal pain, nausea, belching, bloating or excessive gas:  rest,   eat lightly and use a heating pad.  - Sore Throat: treat with throat lozenges and/or gargle with warm salt   water.  - Because air was used during the procedure, expelling large amounts of air   from your rectum or belching is normal.  - If a bowel prep was taken, you may not have a bowel movement for 1-3 days.    This is normal.  SYMPTOMS TO WATCH FOR AND REPORT TO YOUR PHYSICIAN:  1. Abdominal pain or bloating, other than gas cramps.  2. Chest pain.  3. Back pain.  4. Signs of infection such as: chills or fever occurring within 24 hours   after the procedure.  5. Rectal bleeding, which would show as bright red, maroon, or black stools.   (A tablespoon of blood from the rectum is not serious, especially if    hemorrhoids are present.)  6. Vomiting.  7. Weakness or dizziness.  GO DIRECTLY TO THE NEAREST EMERGENCY ROOM IF YOU HAVE ANY OF THE FOLLOWING:      Difficulty breathing              Chills and/or fever over 101 F   Persistent vomiting and/or vomiting blood   Severe abdominal pain   Severe chest pain   Black, tarry stools   Bleeding- more than one tablespoon   Any other symptom or condition that you feel may need urgent attention  Your doctor recommends these additional instructions:  If any biopsies were taken, your doctors clinic will contact you in 1 to 2   weeks with any results.  - Discharge patient to home.   - Perform a colonoscopy today.   - The findings and recommendations were discussed with the designated   responsible adult.  For questions, problems or results please call your physician - Durga Harvey MD at Work:  [phone].  [facility], EMERGENCY ROOM PHONE NUMBER: [phone]  IF A COMPLICATION OR EMERGENCY SITUATION ARISES AND YOU ARE UNABLE TO REACH   YOUR PHYSICIAN - GO DIRECTLY TO THE EMERGENCY ROOM.  Durga Harvey MD  2/24/2025 8:27:48 AM  This report has been verified and signed electronically.  Dear patient,  As a result of recent federal legislation (The Federal Cures Act), you may   receive lab or pathology results from your procedure in your [facility]   account before your physician is able to contact you. Your physician or   their representative will relay the results to you with their   recommendations at their soonest availability.  Thank you,  PROVATION

## 2025-02-24 NOTE — TRANSFER OF CARE
"Anesthesia Transfer of Care Note    Patient: Raffy Rutherford Jr.    Procedure(s) Performed: Procedure(s) (LRB):  EGD (ESOPHAGOGASTRODUODENOSCOPY) (N/A)  COLONOSCOPY (N/A)    Patient location: PACU    Anesthesia Type: general    Transport from OR: Transported from OR on room air with adequate spontaneous ventilation    Post pain: adequate analgesia    Post assessment: no apparent anesthetic complications and tolerated procedure well    Post vital signs: stable    Level of consciousness: awake and alert    Nausea/Vomiting: no nausea/vomiting    Complications: none    Transfer of care protocol was followed    Last vitals: Visit Vitals  /74   Pulse 71   Temp 36.8 °C (98.2 °F)   Resp 15   Ht 5' 8" (1.727 m)   Wt 78.2 kg (172 lb 6.4 oz)   SpO2 (!) 93%   BMI 26.21 kg/m²     "

## 2025-02-25 ENCOUNTER — PATIENT MESSAGE (OUTPATIENT)
Dept: ORTHOPEDICS | Facility: CLINIC | Age: 75
End: 2025-02-25
Payer: MEDICARE

## 2025-02-25 ENCOUNTER — PATIENT MESSAGE (OUTPATIENT)
Dept: NEUROSURGERY | Facility: CLINIC | Age: 75
End: 2025-02-25
Payer: MEDICARE

## 2025-02-25 ENCOUNTER — PATIENT MESSAGE (OUTPATIENT)
Dept: OPTOMETRY | Facility: CLINIC | Age: 75
End: 2025-02-25
Payer: MEDICARE

## 2025-02-25 NOTE — ANESTHESIA POSTPROCEDURE EVALUATION
Anesthesia Post Evaluation    Patient: Raffy Rutherford Jr.    Procedure(s) Performed: Procedure(s) (LRB):  EGD (ESOPHAGOGASTRODUODENOSCOPY) (N/A)  COLONOSCOPY (N/A)    Final Anesthesia Type: general      Patient location during evaluation: PACU  Patient participation: Yes- Able to Participate  Level of consciousness: awake  Post-procedure vital signs: reviewed and stable  Pain management: adequate  Airway patency: patent    PONV status at discharge: No PONV  Anesthetic complications: no      Cardiovascular status: blood pressure returned to baseline  Respiratory status: unassisted  Hydration status: euvolemic  Follow-up not needed.              Vitals Value Taken Time   /63 02/24/25 09:24   Temp 36.7 °C (98.1 °F) 02/24/25 08:48   Pulse 59 02/24/25 09:24   Resp 14 02/24/25 09:24   SpO2 94 % 02/24/25 09:24         Event Time   Out of Recovery 09:33:48         Pain/Tequila Score: Tequila Score: 7 (2/24/2025  8:48 AM)

## 2025-02-26 ENCOUNTER — OFFICE VISIT (OUTPATIENT)
Dept: OPTOMETRY | Facility: CLINIC | Age: 75
End: 2025-02-26
Payer: MEDICARE

## 2025-02-26 ENCOUNTER — TELEPHONE (OUTPATIENT)
Dept: PAIN MEDICINE | Facility: CLINIC | Age: 75
End: 2025-02-26
Payer: MEDICARE

## 2025-02-26 DIAGNOSIS — Z98.1 ADJACENT SEGMENT DISEASE OF LUMBAR SPINE WITH HISTORY OF FUSION PROCEDURE: Primary | ICD-10-CM

## 2025-02-26 DIAGNOSIS — M51.369 ADJACENT SEGMENT DISEASE OF LUMBAR SPINE WITH HISTORY OF FUSION PROCEDURE: Primary | ICD-10-CM

## 2025-02-26 DIAGNOSIS — M54.16 LUMBAR RADICULOPATHY: ICD-10-CM

## 2025-02-26 DIAGNOSIS — Z98.1 ADJACENT SEGMENT DISEASE OF LUMBAR SPINE WITH HISTORY OF FUSION PROCEDURE: ICD-10-CM

## 2025-02-26 DIAGNOSIS — M54.16 LUMBAR RADICULOPATHY: Primary | ICD-10-CM

## 2025-02-26 DIAGNOSIS — M51.369 ADJACENT SEGMENT DISEASE OF LUMBAR SPINE WITH HISTORY OF FUSION PROCEDURE: ICD-10-CM

## 2025-02-26 DIAGNOSIS — H04.123 DRY EYE SYNDROME OF BOTH EYES: Primary | ICD-10-CM

## 2025-02-26 PROCEDURE — 3288F FALL RISK ASSESSMENT DOCD: CPT | Mod: CPTII,S$GLB,, | Performed by: OPTOMETRIST

## 2025-02-26 PROCEDURE — 1126F AMNT PAIN NOTED NONE PRSNT: CPT | Mod: CPTII,S$GLB,, | Performed by: OPTOMETRIST

## 2025-02-26 PROCEDURE — 99212 OFFICE O/P EST SF 10 MIN: CPT | Mod: S$GLB,,, | Performed by: OPTOMETRIST

## 2025-02-26 PROCEDURE — 1101F PT FALLS ASSESS-DOCD LE1/YR: CPT | Mod: CPTII,S$GLB,, | Performed by: OPTOMETRIST

## 2025-02-26 PROCEDURE — 1157F ADVNC CARE PLAN IN RCRD: CPT | Mod: CPTII,S$GLB,, | Performed by: OPTOMETRIST

## 2025-02-26 PROCEDURE — 99999 PR PBB SHADOW E&M-EST. PATIENT-LVL I: CPT | Mod: PBBFAC,,, | Performed by: OPTOMETRIST

## 2025-02-26 PROCEDURE — 1160F RVW MEDS BY RX/DR IN RCRD: CPT | Mod: CPTII,S$GLB,, | Performed by: OPTOMETRIST

## 2025-02-26 PROCEDURE — 1159F MED LIST DOCD IN RCRD: CPT | Mod: CPTII,S$GLB,, | Performed by: OPTOMETRIST

## 2025-02-26 NOTE — TELEPHONE ENCOUNTER
Pt is requesting to decrease the EMSAM to 9mg instead of the 12mg, pended the 9mg for your review     LOV with Haseeb Puentes MD , 8/8/2024

## 2025-02-26 NOTE — TELEPHONE ENCOUNTER
I changed the injection to bilateral L2-3 TFESI since it is safer for this patient.  I changed the CPT but left it No sedation.  Thank you

## 2025-02-26 NOTE — PROGRESS NOTES
L2-3 epidural steroid injection ordered for adjacent segment disease at L2-3 with left low back, hip and groin pain.

## 2025-02-26 NOTE — PROGRESS NOTES
HPI    DLS: 5/15/2024 - Dr. Ocampo   Dry eye f/u     Pt states that he had a sudden onset of blurry vision that started about a   week ago. Pt is not sure if was cause by being put under for sx because   the symptom started after that. Pt states that his eyes occasionally   become irritated but if he is diligent with his drops it doesn't occur as   often. Pt states that when he uses the drops it can trigger his headaches   he gets behind is left eye.     GTTS:  Vevye BID  Refresh  QD-BID  Ivizia PM ointment - Qhs  Miebo PRN    Last edited by Loren Vale MA on 2/26/2025 10:49 AM.            Assessment /Plan     For exam results, see Encounter Report.    Dry eye syndrome of both eyes      Start ointment QHS OD  Miebo OS  Vevye OD      RTC 1-2 months for complete exam

## 2025-02-26 NOTE — TELEPHONE ENCOUNTER
----- Message from Hussein Montalvo MD sent at 2025 12:19 PM CST -----  Regarding: Order for JAQUI RICHARDSON JR.    Patient Name: JAQUI RICHARDSON JR.([id number])  Sex: Male  : 1950      PCP: FABI ELLIOTT    Center: [facility] CENTRAL BILLING OFFICE     Types of orders made on 2025: Procedure Request    Order Date:2025  Ordering User:HUSSEIN MONTALVO [[id number]]  Encounter Provider:Hussein Montalvo MD [[id number]]  Authorizing Provider: Hussein Montalvo MD [[id number]]  Department:[facility] NEUROSURGERY[[id number]]    Common Order Information  Procedure -> Epidural Injection (specify level) Cmt: L2-3    Order Specific Information  Order: Procedure Order to Pain Management [Custom: [id number]]  Order #:          [id number]: 1 FUTURE    Priority: Routine  Class: Clinic Performed    Future Order Information      Expires on:2026            Expected by:2025                   Associated Diagnoses      M51.369, Z98.1 Adjacent segment disease of lumbar spine with history of       fusion procedure      M54.16 Lumbar radiculopathy      Facility Name: -> [facility]           Priority: Routine  Class: Clinic Performed    Future Order Information      Expires on:2026            Expected by:2025                   Associated Diagnoses      M51.369, Z98.1 Adjacent segment disease of lumbar spine with history of       fusion procedure      M54.16 Lumbar radiculopathy      Procedure -> Epidural Injection (specify level) Cmt: L2-3        Facility Name: -> [facility]

## 2025-03-03 ENCOUNTER — PATIENT MESSAGE (OUTPATIENT)
Dept: REHABILITATION | Facility: HOSPITAL | Age: 75
End: 2025-03-03
Payer: MEDICARE

## 2025-03-03 ENCOUNTER — PATIENT MESSAGE (OUTPATIENT)
Dept: NEUROSURGERY | Facility: CLINIC | Age: 75
End: 2025-03-03
Payer: MEDICARE

## 2025-03-03 ENCOUNTER — TELEPHONE (OUTPATIENT)
Dept: PAIN MEDICINE | Facility: CLINIC | Age: 75
End: 2025-03-03
Payer: MEDICARE

## 2025-03-06 NOTE — PRE-PROCEDURE INSTRUCTIONS
Unable to reach pt via phone.  Left voicemail with arrival time also informing pt of need for responsible  accompaniment and instructing pt to follow pre-procedure instructions provided via [facility] portal.  The following message was sent to pt's portal.      Dear Raffy ,     Please read over the following pre-procedure instructions in it's entirety as there is helpful information here to get you well prepared for your upcoming procedure.     You are scheduled for a procedure with Dr. Mcdonald on 3/10/25.     [facility] is located at the corner of [facility] and [facility]. [facility] to [facility]. The address is: [address] [place] [address]. Take the elevator to the 2nd floor.      Registration check in time: 1:25 pm  Scheduled procedure time: 2:25 pm     You are scheduled to receive:_______Oral sedation                                                                 _______IV Sedation                                                                 ___X___No Sedation                                                                                           If you are receiving any sedation, you CANNOT drive yourself and must have a responsible friend or family member (no rideshare) to drive you home.     You should take any medications that you routinely take for blood pressure, heart medications, thyroid, cholesterol, etc.      *The fasting restrictions are dependent on whether or not you are receiving sedation. Sedation is not available for all procedures.      Your fasting instructions for sedation patients are as follow:  IV sedation. Nothing to eat after midnight the night prior to procedure. Patients are encouraged to consume clear liquids up to 2 hours prior to scheduled arrival time. -Clear liquids include Gatorade, water, soda, black coffee or tea (no milk or creamer), and clear juices. - Clear liquids do NOT include anything with pulp or food particles  (chicken broth, ice cream, yogurt, Jello, etc.) You CANNOT drive yourself and must have a .            If you are on blood thinners, you need to follow the anticoagulation instructions that had been discussed previously. You should only stop the blood thinners if it was approved by your primary care physician or your cardiologist. In the event that you are not able to stop your blood thinners, a blood thinner was not listed on your medication list, or we were not able to get clearance from your cardiologist, then the procedure may have to be postponed/canceled.      IF you were told to stop your blood thinners, this is how long you should generally hold some of the more common ones. Remember that stopping blood thinners is only necessary for certain procedures. If you are unsure of your instructions, please call us.   Aspirin - 5 days  Plavix/Clopidogrel - 7 days  Warfarin / Coumadin - 5 days  Eliquis - 3 days  Pradaxa/Dabigatran - 4 days  Xarelto/Rivaroxaban - 3 days     HOLD all non-insulin injections (shots) until after surgery (Semaglutide, Tirzepatide, Ozempic, Mounjaro, Trulicity, Victoza, Byetta, Wegovy and Adlyxin) (Total of 7 days prior)        If you are a diabetic, do not take your medication if you will be fasting, but bring it with you. Please plan on being here for roughly 2-3 hours. Please note that most procedures will not be performed if you blood sugar is >200.     Please call us if you have been sick (running fever, having any flu-like symptoms) or have been taking ANTIBIOTICS in the past 2 weeks or had any outpatient procedures other than with us (colonoscopy, endoscopy, OBGYN, dental, etc.).      If you have been previously COVID positive, you will need to hold off on your procedure until you are symptom free for 10 days. If you did not have any symptoms, you can have your procedure 10 days from your positive test result.         On the morning of your procedure:  *HOLD ALL VITAMINS,  MINERALS, HERBS (INCLUDING HERBAL TEAS) AND SUPPLEMENTS  *SHOWER WITH ANTIBACTERIAL SOAP (EX. DIAL) NIGHT BEFORE AND MORNING OF PROCEDURE  *DO NOT APPLY ANY LOTIONS, OILS, POWDERS, PERFUME/COLOGNE, OINTMENTS, GELS, CREAMS, MAKEUP OR DEODORANT TO YOUR SKIN MORNING OF PROCEDURE  *LEAVE JEWELRY AND ANY VALUABLES AT HOME  *WEAR LOOSE COMFORTABLE CLOTHING      In the event that you are running late or need to reschedule on the day of your procedure, please contact the pre-op desk at [phone].       Please reply to this portal message as receipt of delivery.     Thank you,  [facility]  [place], LPN [facility]  Pre-Admit

## 2025-03-10 ENCOUNTER — HOSPITAL ENCOUNTER (OUTPATIENT)
Facility: HOSPITAL | Age: 75
Discharge: HOME | End: 2025-03-10
Attending: STUDENT IN AN ORGANIZED HEALTH CARE EDUCATION/TRAINING PROGRAM | Admitting: STUDENT IN AN ORGANIZED HEALTH CARE EDUCATION/TRAINING PROGRAM
Payer: MEDICARE

## 2025-03-10 VITALS
OXYGEN SATURATION: 95 % | BODY MASS INDEX: 26.37 KG/M2 | WEIGHT: 174 LBS | TEMPERATURE: 99 F | HEART RATE: 57 BPM | HEIGHT: 68 IN | RESPIRATION RATE: 16 BRPM | DIASTOLIC BLOOD PRESSURE: 65 MMHG | SYSTOLIC BLOOD PRESSURE: 117 MMHG

## 2025-03-10 DIAGNOSIS — M54.16 LUMBAR RADICULOPATHY: Primary | ICD-10-CM

## 2025-03-10 DIAGNOSIS — G89.29 CHRONIC PAIN: ICD-10-CM

## 2025-03-10 PROCEDURE — 64483 NJX AA&/STRD TFRM EPI L/S 1: CPT | Mod: 50 | Performed by: STUDENT IN AN ORGANIZED HEALTH CARE EDUCATION/TRAINING PROGRAM

## 2025-03-10 PROCEDURE — 64483 NJX AA&/STRD TFRM EPI L/S 1: CPT | Mod: 50,,, | Performed by: STUDENT IN AN ORGANIZED HEALTH CARE EDUCATION/TRAINING PROGRAM

## 2025-03-10 PROCEDURE — 25500020 PHARM REV CODE 255: Performed by: STUDENT IN AN ORGANIZED HEALTH CARE EDUCATION/TRAINING PROGRAM

## 2025-03-10 PROCEDURE — 63600175 PHARM REV CODE 636 W HCPCS: Performed by: STUDENT IN AN ORGANIZED HEALTH CARE EDUCATION/TRAINING PROGRAM

## 2025-03-10 RX ORDER — DEXAMETHASONE SODIUM PHOSPHATE 10 MG/ML
INJECTION, SOLUTION INTRA-ARTICULAR; INTRALESIONAL; INTRAMUSCULAR; INTRAVENOUS; SOFT TISSUE
Status: DISCONTINUED | OUTPATIENT
Start: 2025-03-10 | End: 2025-03-10 | Stop reason: HOSPADM

## 2025-03-10 RX ORDER — LIDOCAINE HYDROCHLORIDE 10 MG/ML
INJECTION, SOLUTION EPIDURAL; INFILTRATION; INTRACAUDAL; PERINEURAL
Status: DISCONTINUED | OUTPATIENT
Start: 2025-03-10 | End: 2025-03-10 | Stop reason: HOSPADM

## 2025-03-10 RX ORDER — LIDOCAINE HYDROCHLORIDE 20 MG/ML
INJECTION, SOLUTION EPIDURAL; INFILTRATION; INTRACAUDAL; PERINEURAL
Status: DISCONTINUED | OUTPATIENT
Start: 2025-03-10 | End: 2025-03-10 | Stop reason: HOSPADM

## 2025-03-10 NOTE — H&P
HPI  Patient presenting for Procedure(s) (LRB):  Bilateral L2-3 TFESI (Bilateral)     Patient on Anti-coagulation No    No health changes since previous encounter    Past Medical History:   Diagnosis Date    Allergy     Arthritis     Back pain     after trauma beginning in 195    Cataract     Chronic pain     neck and left shoulder    Cluster headache 2013    Colon polyps 2007 2007-2019: TA x5, HP x3    Degenerative disc disease     Depression     Diverticulitis 12/2013    Dry eye syndrome     Fibromyalgia 2013    GERD (gastroesophageal reflux disease)     Hepatitis 1970's    A    History of prostate biopsy 2002    Hyperlipidemia     Joint pain     Prostate cancer 07/2021    PVD (posterior vitreous detachment)     Salzmann's nodular dystrophy of left eye     Sleep apnea     Thyroid nodule 07/16/2014    Trigeminal neuralgia of left side of face     Tubular adenoma of colon 2007    5 removed [id number]    Visual disturbance 2012    problems after cataract surgery     Past Surgical History:   Procedure Laterality Date    ARTHROSCOPIC DEBRIDEMENT OF SHOULDER Left 04/19/2024    Procedure: DEBRIDEMENT, SHOULDER, ARTHROSCOPIC;  Surgeon: GEORGIANA Up MD;  Location: [facility] OR;  Service: Orthopedics;  Laterality: Left;  Regional w/o Catheter, Interscalene, 0.5% Marcaine Plain    ARTHROSCOPIC REPAIR OF ROTATOR CUFF OF SHOULDER Left 04/19/2024    Procedure: REPAIR, ROTATOR CUFF, ARTHROSCOPIC;  Surgeon: GEORGIANA Up MD;  Location: [facility] OR;  Service: Orthopedics;  Laterality: Left;    ARTHROSCOPY OF SHOULDER WITH DECOMPRESSION OF SUBACROMIAL SPACE Left 04/19/2024    Procedure: ARTHROSCOPY, SHOULDER, WITH SUBACROMIAL SPACE DECOMPRESSION;  Surgeon: GEORGIANA Up MD;  Location: [facility] OR;  Service: Orthopedics;  Laterality: Left;    ARTHROSCOPY,SHOULDER,WITH BICEPS TENODESIS Left 04/19/2024    Procedure: ARTHROSCOPY,SHOULDER,WITH BICEPS TENODESIS;  Surgeon: GEORGIANA pU MD;  Location: [facility] OR;  Service:  Orthopedics;  Laterality: Left;    BACK SURGERY      CARPAL TUNNEL RELEASE Right 05/18/2021    Procedure: RELEASE, CARPAL TUNNEL;  Surgeon: Kaye Solis MD;  Location: [facility] OR;  Service: Orthopedics;  Laterality: Right;    CATARACT EXTRACTION W/  INTRAOCULAR LENS IMPLANT Bilateral     CHOLECYSTECTOMY      COLONOSCOPY N/A 12/17/2019    Normal - Repeat 5yrs    COLONOSCOPY  2007 2007 TA x2, 2011 TA x3, 2014 HP x3, 2019 normal    COLONOSCOPY N/A 2/24/2025    Procedure: COLONOSCOPY;  Surgeon: Durga Harvey MD;  Location: [facility] CAL (4TH FLR);  Service: Endoscopy;  Laterality: N/A;  11/26 ref by ALEJANDRO QuickC, Suflave, instructions handed to pt in ofc and portal. [email or web address]  Jm/pt wife Valarie Rutherford rescheduled to an earlier time/ prep inst given in office / Changed prep to miralax /referral JACK barlow  Pt rescheduled to due to new ins/pt moved procedure to      COSMETIC SURGERY  02/10/2015    Direct mid-forehead brow lift    COSMETIC SURGERY  02/10/2015    Bilateral upper lid blepahroplasty    CYSTOSCOPY WITH URETEROSCOPY, RETROGRADE PYELOGRAPHY, AND INSERTION OF STENT Left 08/19/2020    Procedure: CYSTOSCOPY, WITH RETROGRADE PYELOGRAM AND URETERAL STENT INSERTION;  Surgeon: Katelynn George MD;  Location: [facility] OR;  Service: Urology;  Laterality: Left;    ESOPHAGOGASTRODUODENOSCOPY N/A 12/17/2019    Procedure: ESOPHAGOGASTRODUODENOSCOPY (EGD);  Surgeon: Amadou Hardin MD;  Location: [facility] (4TH FLR);  Service: Endoscopy;  Laterality: N/A;    ESOPHAGOGASTRODUODENOSCOPY N/A 2/24/2025    Procedure: EGD (ESOPHAGOGASTRODUODENOSCOPY);  Surgeon: Durga Harvey MD;  Location: [facility] ENDO (4TH FLR);  Service: Endoscopy;  Laterality: N/A;    EYE SURGERY      FUSION OF LUMBAR SPINE BY ANTERIOR APPROACH N/A 08/20/2020    Procedure: FUSION, SPINE, LUMBAR, ANTERIOR APPROACH L5-S1 ALIF Stand Alone;  Surgeon: Jason Caldwell MD;  Location: [facility] OR;  Service: Neurosurgery;  Laterality: N/A;    HEMORRHOID SURGERY      with complication of  chronic bleeding for 6 weeks     INJECTION OF STEROID Right 12/06/2018    Procedure: INJECTION, STEROID Right SI Joint Block and Steroid Injection;  Surgeon: Jason Caldwell MD;  Location: [facility] OR;  Service: Neurosurgery;  Laterality: Right;  Procedure: Right SI Joint Block and Steroid Injection  Surgery Time: 30 MIN  LOS: 0  Anesthesia: MAC  Radiology: C-arm  Bed: [place] [id number] Poster  Position: Prone    INJECTION OF STEROID Right 09/19/2019    Procedure: INJECTION, STEROID Procedure: Right SI joint block nd steroid injection;  Surgeon: Jason Caldwell MD;  Location: [facility] OR;  Service: Neurosurgery;  Laterality: Right;  Procedure: Right SI joint block nd steroid injection  Surgery Time: 30 mins  LOS:   Anesthesia: General MAC  Radiology:C-arm  Bed: Regular Bed  Position: Prone    IRRIGATION AND DEBRIDEMENT OF UPPER EXTREMITY Left 04/07/2022    Procedure: IRRIGATION AND DEBRIDEMENT, UPPER EXTREMITY;  Surgeon: Kaye Solis MD;  Location: [facility] OR;  Service: Orthopedics;  Laterality: Left;    JOINT REPLACEMENT      KNEE SURGERY      involving arthroscopic surgery to both knees    REPAIR OF EXTENSOR TENDON Left 04/07/2022    Procedure: REPAIR, TENDON, EXTENSOR thumb, EPB and EPL;  Surgeon: Kaye Solis MD;  Location: [facility] OR;  Service: Orthopedics;  Laterality: Left;    SHOULDER SURGERY      SINUS SURGERY      left molar and sinus surgery for trigeminal neuralgia    SPINAL FUSION  06/22/2015    L3-L5 XLIF/TANA    TOTAL HIP ARTHROPLASTY  04/2012    Pt states he had total hip replacement on his left hip.    ULNAR NERVE TRANSPOSITION Left 12/16/2020    Procedure: TRANSPOSITION, NERVE, ULNAR - left carpal and cubital tunnel releases;  Surgeon: Addi Bauer MD;  Location: [facility];  Service: Orthopedics;  Laterality: Left;     Review of patient's allergies indicates:   Allergen Reactions    Alphagan [brimonidine]      Patient taking MASO-B Selective Inhibitor Selegiline (Emsam)    Coumadin [warfarin]      itch     Oxycodone      hiccups      No current facility-administered medications for this encounter.       PMHx, PSHx, Allergies, Medications reviewed in epic    ROS negative except pain complaints in HPI    OBJECTIVE:    There were no vitals taken for this visit.    PHYSICAL EXAMINATION:    GENERAL: Well appearing, in no acute distress, alert and oriented x3.  PSYCH:  Mood and affect appropriate.  SKIN: Skin color, texture, turgor normal, no rashes or lesions which will impact the procedure.  CV: RRR with palpation of the radial artery.  PULM: No evidence of respiratory difficulty, symmetric chest rise. Clear to auscultation.  NEURO: Cranial nerves grossly intact.    Plan:    Proceed with procedure as planned Procedure(s) (LRB):  Bilateral L2-3 TFESI (Bilateral)    Malika Parker  03/10/2025

## 2025-03-10 NOTE — DISCHARGE INSTRUCTIONS
[facility]  [employer] service # [phone]  On-call pager for emergency# [phone]     POST-PROCEDURE INSTRUCTIONS:    Today you had an injection that included a steroid medications.  The steroid may or may not have been mixed with a local anesthetic when it was injected.   If the injection was in the neck, you may feel some pressure, numbness, or slight weakness in the arm after the procedure for a short period of time (this is a normal response), if this persists for longer than 1 day please contact our office or go to the emergency room.  If the injection was in the low back, you may feel some pressure, numbness, or slight weakness in the leg after the procedure for a short period of time (this is a normal response), if this persists for longer than 1 day please contact our office or go to the emergency room.  You may get side effects from the steroid.  This is not uncommon.  Symptoms include: elevated blood sugar, elevated blood pressure, headache, flushing, nausea, insomnia.  These symptoms are transient and will resolve within 1-3 days.  If symptoms last longer than this please contact our office or head to the emergency room.  Steroid medications can take anywhere from 3-14 days to take effect (rarely longer).  You may notice that your pain worsens for a short period of time after the injection, this would not be unusual due to the pressure and trauma from the needle.    If you do not have a follow up appointment scheduled, please contact my office (or the office of the physician who referred you for the procedure) to get a post-procedure follow up scheduled 2-4 weeks after the procedure.  This can be done as a virtual visit if that is more convenient for you.      What you need to do:    Keep a record of your response to the injection you had today.    How much relief did you get?   When did the relief start and how long did it last?  Were you  able to decrease the use of any of your pain medications?  Were you able to increase your level of activity?  How long did the relief last?    What to watch out for:    If you experience any of the following symptoms after your procedure, please notify the messaging service immediately (see above for contact information):   fever (increased oral temperature)   bleeding or swelling at the injection site,    drainage, rash or redness at the injection site    possible signs of infection    increased pain at the injection site   worsening of your usual pain   severe headache   new or worsening numbness    new arm and/or leg weakness, or    changes in bowel and/or bladder function: urinating or defecating on yourself and not knowing that you did it.    PLEASE FOLLOW ALL INSTRUCTIONS CAREFULLY     Do not engage in strenuous activity (e.g., lifting or pushing heavy objects or repeated bending) for 24 hours.     Do not take a bath, swim or use Jacuzzi for 24 hours after procedure. (A shower is fine).   Remove any Band-Aids when you get home.    Use cold/ice, as needed for comfort.  We recommend the use of cold therapy alternating on for 20 minutes, off for 20 minutes.    Do not apply direct heat (heating pad or heat packs) to the injection site for 24 hours.     Resume your usual medications, unless instructed otherwise by your Pain Physician.     If you are on warfarin (Coumadin) or other blood thinner, resume this medication as instructed by your prescribing Physician.    IF AT ANY POINT YOU ARE VERY CONCERNED ABOUT YOUR SYMPTOMS, PLEASE GO TO THE EMERGENCY ROOM.    If you develop worsening pain, weakness, numbness, lose bowel or bladder control (i.e., having an accident where you did not even know you had to go to the bathroom and suddenly noticed you soiled yourself), saddle anesthesia (a loss of sensation restricted to the area of the buttocks, anus and between the legs -- i.e., those parts of your body that would  touch a saddle if you were sitting on one) you need to go immediately to the emergency department for evaluation and treatment.    ----------------------------------------------------------------------------------------------------------------------------------------------------------------  If you received Sedation please read the following instructions:  POST SEDATION INSTRUCTIONS    Today you received intravenous medication (also known as sedation) that was used to help you relax and/or decrease discomfort during your procedure. This medication will be acting in your body for the next 24 hours, so you might feel a little tired or sleepy. This feeling will slowly wear off.   Common side effects associated with these medications include: drowsiness, dizziness, sleepiness, confusion, feeling excited, difficulty remembering things, lack of steadiness with walking or balance, loss of fine muscle control, slowed reflexes, difficulty focusing, and blurred vision.  Some over-the-counter and prescription medications (e.g., muscle relaxants, opioids, mood-altering medications, sedatives/hypnotics, antihistamines) can interact with the intravenous medication you received and cause an increased risk of the side effects listed above in addition to other potentially life threatening side effects. Use extreme caution if you are taking such medications, and consult with your Pain Physician or prescribing physician if you have any questions.  For the next 12-24 hours:    DO NOT--Drive a car, operate machinery or power tools   DO NOT--Drink any alcoholic beverages (not even beer), they may dangerously increase the risk of side effects.    DO NOT--Make any important legal or business decisions or sign important documents.  We advise you to have someone to assist you at home. Move slowly and carefully. Do not make sudden changes in position. Be aware of dizziness or light-headedness and move accordingly.   If you seek medical  treatment within 24 hours, let the nurse or doctor caring for you know that you have received the above medications. If you have any questions or concerns related to your sedation or treatment today please contact us.

## 2025-03-10 NOTE — DISCHARGE SUMMARY
[facility]  Discharge Note  Short Stay    Procedure(s) (LRB):  Bilateral L2-3 TFESI (Bilateral)      OUTCOME: Patient tolerated treatment/procedure well without complication and is now ready for discharge.    DISPOSITION: Home or Self Care    FINAL DIAGNOSIS:  <principal problem not specified>    FOLLOWUP: In clinic    DISCHARGE INSTRUCTIONS:  No discharge procedures on file.     TIME SPENT ON DISCHARGE: 10 minutes

## 2025-03-10 NOTE — PLAN OF CARE
Patient discharge instructions reviewed, patient verbalized understanding. Ambulated to the waiting room with steady gait .  No concerns voiced.

## 2025-03-10 NOTE — OP NOTE
"PROCEDURE: bilateral Lumbar L2-3 Transforaminal Epidural Steroid Injection    Patient Name: Raffy Rutherford Jr.  MRN: [id number]    PROCEDURE DATE: 3/10/2025    INJECTION # 1    DIAGNOSIS: Lumbar Radiculopathy  CPT CODE: 81420 (INJECTION(S), ANESTHETIC AGENT(S) AND/OR STEROID; TRANSFORAMINAL EPIDURAL, WITH IMAGING GUIDANCE (FLUOROSCOPY OR CT), LUMBAR OR SACRAL, SINGLE LEVEL), 47914 (Each additional level).     POSTPROCEDURE DIAGNOSIS: Same    PHYSICIAN: Herman Palomino DO  NEEDLE TYPE: - 22G 5" Spinal Needle  MEDICATIONS INJECTED: 6ml mixture of 1ml Dexamethasone 10mg/ml and 5ml 1% lidocaine PF split equally between each site.  CONTRAST: Omni 300    Sedation Medications - None    Estimated Blood Loss - <2ml  Drains: None  Specimens Removed: None  Urine Output - Not Measured  Complications: None  Outcome: Good    Informed Consent:  The patient's condition and proposed procedures, risks, and alternatives were discussed with the patient or responsible party.  The patient's / responsible party's questions were answered.   The patient / responsible party appeared to understand and chose to proceed.  Informed consent was obtained.  After obtaining written consent, an IV hep lock was placed. (See nurses notes for details).     Procedure in Detail:  The patient was taken back to the OR suite and placed in a prone position. The skin overlying the injection site was prepped and draped in an aseptic fashion. The target injection site (see above) was identified with fluoroscopy.     Procedural Pause:  A procedural pause verifying correct patient, medical record number, allergies, medications to be administered, current vital signs, and surgical site was performed immediately prior to beginning the procedure.    The skin and subcutaneous tissue overlying the target site(s) of injection for the L2-3 transforaminal epidural steroid injection was/were anesthetized using 4 mL of 1% lidocaine with a 25-gauge, 1½-inch needle.  "     The fluoroscopic beam was aligned to create a tunnel view.  The above noted needle was advanced parallel to the fluoroscopic beam towards the above noted foramen under fluoroscopic guidance.  The final position of the needle(s) was identified using AP and lateral views.  Paresthesias were not noted with final needle positioning.       A microbore extension tubing was attached to the needle to minimize any movement of the needle during injection or syringe change.  After negative aspiration for heme or CSF at each site(s) where the needle(s) was placed, 0.5ml of contrast dye was injected to confirm appropriate placement and that there was no vascular uptake.  Pain provocation by the injected contrast material was not noted.  Then the injectate solution described above was injected in increments.  The needle was then retracted approximately care home and the needle track was flushed with 0.5 mL of Lidocaine 1%.  The needle(s) was then removed.     The same procedural technique outlined above was repeated on the OPPOSITE side.    The heart rate, pulse oximetry, and blood pressure were continuously monitored throughout the procedure.  The procedure was well tolerated. He was carefully escorted to the recovery room in stable condition. Patient was monitored by RN for recovery period.  The patient will be contacted in the next few days to determine extent of relief.  Patient was given post procedure and discharge instructions to follow at home.  The patient was discharged in a stable condition.    Note Electronically Signed By:  Herman Sung  03/10/2025

## 2025-03-12 ENCOUNTER — PATIENT MESSAGE (OUTPATIENT)
Dept: REHABILITATION | Facility: HOSPITAL | Age: 75
End: 2025-03-12
Payer: MEDICARE

## 2025-03-13 ENCOUNTER — CLINICAL SUPPORT (OUTPATIENT)
Dept: REHABILITATION | Facility: HOSPITAL | Age: 75
End: 2025-03-13
Payer: MEDICARE

## 2025-03-13 DIAGNOSIS — G89.29 CHRONIC BILATERAL LOW BACK PAIN WITH BILATERAL SCIATICA: ICD-10-CM

## 2025-03-13 DIAGNOSIS — M54.42 CHRONIC BILATERAL LOW BACK PAIN WITH BILATERAL SCIATICA: ICD-10-CM

## 2025-03-13 DIAGNOSIS — M54.41 CHRONIC BILATERAL LOW BACK PAIN WITH BILATERAL SCIATICA: ICD-10-CM

## 2025-03-13 DIAGNOSIS — M21.372 FOOT DROP, BILATERAL: Primary | ICD-10-CM

## 2025-03-13 DIAGNOSIS — M21.371 FOOT DROP, BILATERAL: Primary | ICD-10-CM

## 2025-03-13 PROCEDURE — 97112 NEUROMUSCULAR REEDUCATION: CPT

## 2025-03-14 NOTE — PROGRESS NOTES
"  Outpatient Rehab    Physical Therapy Progress Note    Patient Name: Raffy Rutherford Jr.  MRN: [id number]  YOB: 1950  Encounter Date: 3/13/2025    Therapy Diagnosis:   Encounter Diagnoses   Name Primary?    Foot drop, bilateral Yes    Chronic bilateral low back pain with bilateral sciatica      Physician: Lisandra Trinh NP    Physician Orders: Eval and Treat  Medical Diagnosis: Foot drop, right foot [M21.371], Foot drop, left foot [M21.372], Radiculopathy, site unspecified [M54.10]     Visit # / Visits Authorized:  3 / 20   Date of Evaluation:  2/3/2025  Insurance Authorization Period: 2/3/2025 to 4/11/2025  Plan of Care Certification:  2/4/2025 to 4/14/2025      Time In: 1320   Time Out: 1420  Total Time: 60   Total Billable Time: 60 minutes    FOTO: See media section.        Subjective   Patient reports that his low back has been bothering him. Reports pain when sitting in "traditional style" while playing cello. He recently received lumbar injections, but no relief yet. He would like to transfer to [facility]..         Objective      Lumbar Range of Motion   Active (deg) Passive (deg) Pain   Flexion 70       Extension 15   Yes   Right Lateral Flexion 30   Yes   Right Rotation 30       Left Lateral Flexion 30   Yes   Left Rotation 30                           Hip Strength - Planes of Motion   Right Strength Right Pain Left Strength Left  Pain   Flexion (L2) 4   4- Yes   Extension           ABduction           ADduction           Internal Rotation 4   4     External Rotation 4   4         Ankle/Foot Strength - Planes of Motion   Right Strength Right Pain Left Strength Left  Pain   Dorsiflexion (L4) 3   2+     Plantar Flexion (S1)           Inversion           Eversion           Great Toe Flexion           Great Toe Extension (L5)           Lesser Toes Flexion           Lesser Toes Extension                    Treatment:  Balance/Neuromuscular Re-Education  Balance/Neuromuscular Re-Education " Activity 1: Fwd/lateral Swiss ball roll outs: 3-5 minutes each  Balance/Neuromuscular Re-Education Activity 2: LTR, posterior pelvic tilts, ppt+knee fall outs: 5 minutes each  Balance/Neuromuscular Re-Education Activity 3: Reassessment    Assessment & Plan   Assessment: Raffy presents to physical therapy with reports of increased low back pain. Patient reported increased pain with lumbar PAs throughout lower lumbar spine. He reports pain with left hip flexion, right and left lateral sidebending. Patient tolerated lumbar mobility exercises well with no exacerbations of lumbar pain. PT will follow up with [facility] to discuss transfer for patient.  Evaluation/Treatment Tolerance: Patient tolerated treatment well    Patient will continue to benefit from skilled outpatient physical therapy to address the deficits listed in the problem list box on initial evaluation, provide pt/family education and to maximize pt's level of independence in the home and community environment.     Patient's spiritual, cultural, and educational needs considered and patient agreeable to plan of care and goals.           Plan: Continue toward patient's goals. Follow up with [facility] about transfer.    Goals:   Active       Changing body position       Patient will demonstrate moving sit to stand without upper extremity support for independence with transfers.  (Progressing)       Start:  02/04/25    Expected End:  04/14/25               Functional outcome       Patient will show a significant change in FOTO patient-reported outcome tool to demonstrate subjective improvement (Progressing)       Start:  02/04/25    Expected End:  04/14/25            Patient will demonstrate independence in home program for support of progression (Progressing)       Start:  02/04/25    Expected End:  03/14/25               Pain       Patient will report a 2 point reduction in pain while performing ambulation (Progressing)       Start:  02/04/25     Expected End:  03/14/25               Strength       Patient will achieve bilateral knee extension strength of 4/5 (Progressing)       Start:  02/04/25    Expected End:  04/14/25            Patient will achieve bilateral ankle dorsiflexion strength of 4/5 (Progressing)       Start:  02/04/25    Expected End:  04/14/25                Mariel Oscar PT, DPT

## 2025-03-18 ENCOUNTER — CLINICAL SUPPORT (OUTPATIENT)
Dept: REHABILITATION | Facility: HOSPITAL | Age: 75
End: 2025-03-18
Payer: MEDICARE

## 2025-03-18 DIAGNOSIS — M54.42 CHRONIC BILATERAL LOW BACK PAIN WITH BILATERAL SCIATICA: ICD-10-CM

## 2025-03-18 DIAGNOSIS — M21.372 FOOT DROP, BILATERAL: Primary | ICD-10-CM

## 2025-03-18 DIAGNOSIS — G89.29 CHRONIC BILATERAL LOW BACK PAIN WITH BILATERAL SCIATICA: ICD-10-CM

## 2025-03-18 DIAGNOSIS — M21.371 FOOT DROP, BILATERAL: Primary | ICD-10-CM

## 2025-03-18 DIAGNOSIS — M54.41 CHRONIC BILATERAL LOW BACK PAIN WITH BILATERAL SCIATICA: ICD-10-CM

## 2025-03-18 PROCEDURE — 97110 THERAPEUTIC EXERCISES: CPT

## 2025-03-18 PROCEDURE — 97112 NEUROMUSCULAR REEDUCATION: CPT

## 2025-03-27 ENCOUNTER — CLINICAL SUPPORT (OUTPATIENT)
Dept: REHABILITATION | Facility: HOSPITAL | Age: 75
End: 2025-03-27
Payer: MEDICARE

## 2025-03-27 DIAGNOSIS — M54.42 CHRONIC BILATERAL LOW BACK PAIN WITH BILATERAL SCIATICA: ICD-10-CM

## 2025-03-27 DIAGNOSIS — M21.371 FOOT DROP, BILATERAL: Primary | ICD-10-CM

## 2025-03-27 DIAGNOSIS — M21.372 FOOT DROP, BILATERAL: Primary | ICD-10-CM

## 2025-03-27 DIAGNOSIS — G89.29 CHRONIC BILATERAL LOW BACK PAIN WITH BILATERAL SCIATICA: ICD-10-CM

## 2025-03-27 DIAGNOSIS — M54.41 CHRONIC BILATERAL LOW BACK PAIN WITH BILATERAL SCIATICA: ICD-10-CM

## 2025-03-27 PROCEDURE — 97110 THERAPEUTIC EXERCISES: CPT

## 2025-03-27 PROCEDURE — 97140 MANUAL THERAPY 1/> REGIONS: CPT

## 2025-03-27 PROCEDURE — 97112 NEUROMUSCULAR REEDUCATION: CPT

## 2025-04-01 ENCOUNTER — CLINICAL SUPPORT (OUTPATIENT)
Dept: REHABILITATION | Facility: HOSPITAL | Age: 75
End: 2025-04-01
Payer: MEDICARE

## 2025-04-01 DIAGNOSIS — G89.29 CHRONIC BILATERAL LOW BACK PAIN WITH BILATERAL SCIATICA: ICD-10-CM

## 2025-04-01 DIAGNOSIS — M54.41 CHRONIC BILATERAL LOW BACK PAIN WITH BILATERAL SCIATICA: ICD-10-CM

## 2025-04-01 DIAGNOSIS — M21.371 FOOT DROP, BILATERAL: Primary | ICD-10-CM

## 2025-04-01 DIAGNOSIS — M21.372 FOOT DROP, BILATERAL: Primary | ICD-10-CM

## 2025-04-01 DIAGNOSIS — M54.42 CHRONIC BILATERAL LOW BACK PAIN WITH BILATERAL SCIATICA: ICD-10-CM

## 2025-04-01 PROCEDURE — 97110 THERAPEUTIC EXERCISES: CPT

## 2025-04-01 PROCEDURE — 97140 MANUAL THERAPY 1/> REGIONS: CPT

## 2025-04-01 PROCEDURE — 97112 NEUROMUSCULAR REEDUCATION: CPT

## 2025-04-06 NOTE — PROGRESS NOTES
Outpatient Rehab    Physical Therapy Progress Note    Patient Name: Raffy Rutherford Jr.  MRN: [id number]  YOB: 1950  Encounter Date: 3/18/2025    Therapy Diagnosis:   No diagnosis found.    Physician: Lisandra Trinh NP    Physician Orders: Eval and Treat  Medical Diagnosis: Foot drop, right foot [M21.371], Foot drop, left foot [M21.372], Radiculopathy, site unspecified [M54.10]     Visit # / Visits Authorized:  3 / 20   Date of Evaluation:  2/3/2025  Insurance Authorization Period: 2/3/2025 to 4/11/2025  Plan of Care Certification:  2/4/2025 to 4/14/2025      Time In:     Time Out:    Total Time:     Total Billable Time: 60 minutes    FOTO: See media section.        Subjective             Objective           Treatment:  Balance/Neuromuscular Re-Education  NMR 1: Fwd/lateral Swiss ball roll outs: 3-5 minutes each  NMR 2: LTR, posterior pelvic tilts, ppt+knee fall outs: 5 minutes each    therapeutic exercises to develop strength, endurance, ROM, flexibility, posture, and core stabilization for 30 minutes including:  NuStep 3'/3' (fwd/bckwd)  Standing T-Rotation 2 x 10  Shoulder Ext Row 2 x 10  LTRs 3 x 30'  Hamstring Stretch 3 x 30'  ABDIEL Stretch 3 x 30'  Bridges 3 x 10  Sciatic Nerve Glides 3 x 10    manual therapy techniques for 60 minutes, including:  FDN to Lumbar Spine using 6 40 mm needles. 3 mhz e-stim was used. NP  STM to Lumbar Spine, SI Joint  Grd III/IV Joint Mobs to Lumbar Spine, SI Joint  Grd IV Joint Mobs to Lumbar Spine     Assessment & Plan   Assessment:         Patient will continue to benefit from skilled outpatient physical therapy to address the deficits listed in the problem list box on initial evaluation, provide pt/family education and to maximize pt's level of independence in the home and community environment.     Patient's spiritual, cultural, and educational needs considered and patient agreeable to plan of care and goals.           Plan: Continue toward patient's  goals.    Goals:   Active       Changing body position       Patient will demonstrate moving sit to stand without upper extremity support for independence with transfers.  (Progressing)       Start:  02/04/25    Expected End:  04/14/25               Functional outcome       Patient will show a significant change in FOTO patient-reported outcome tool to demonstrate subjective improvement (Progressing)       Start:  02/04/25    Expected End:  04/14/25            Patient will demonstrate independence in home program for support of progression (Progressing)       Start:  02/04/25    Expected End:  03/14/25               Pain       Patient will report a 2 point reduction in pain while performing ambulation (Progressing)       Start:  02/04/25    Expected End:  03/14/25               Strength       Patient will achieve bilateral knee extension strength of 4/5 (Progressing)       Start:  02/04/25    Expected End:  04/14/25            Patient will achieve bilateral ankle dorsiflexion strength of 4/5 (Progressing)       Start:  02/04/25    Expected End:  04/14/25                Claude Roberson, PT, DPT

## 2025-04-07 ENCOUNTER — OFFICE VISIT (OUTPATIENT)
Dept: OPTOMETRY | Facility: CLINIC | Age: 75
End: 2025-04-07
Payer: COMMERCIAL

## 2025-04-07 DIAGNOSIS — Z96.1 PSEUDOPHAKIA OF BOTH EYES: ICD-10-CM

## 2025-04-07 DIAGNOSIS — H43.813 PVD (POSTERIOR VITREOUS DETACHMENT), BILATERAL: ICD-10-CM

## 2025-04-07 DIAGNOSIS — H52.4 PRESBYOPIA: Primary | ICD-10-CM

## 2025-04-07 DIAGNOSIS — G47.30 SLEEP APNEA, UNSPECIFIED TYPE: ICD-10-CM

## 2025-04-07 DIAGNOSIS — H52.203 ASTIGMATISM OF BOTH EYES, UNSPECIFIED TYPE: ICD-10-CM

## 2025-04-07 DIAGNOSIS — H02.889 MGD (MEIBOMIAN GLAND DISEASE), UNSPECIFIED LATERALITY: ICD-10-CM

## 2025-04-07 DIAGNOSIS — H04.123 DRY EYE SYNDROME OF BOTH EYES: ICD-10-CM

## 2025-04-07 PROCEDURE — 99999 PR PBB SHADOW E&M-EST. PATIENT-LVL IV: CPT | Mod: PBBFAC,,, | Performed by: OPTOMETRIST

## 2025-04-07 PROCEDURE — 92014 COMPRE OPH EXAM EST PT 1/>: CPT | Mod: S$GLB,,, | Performed by: OPTOMETRIST

## 2025-04-07 PROCEDURE — 92015 DETERMINE REFRACTIVE STATE: CPT | Mod: S$GLB,,, | Performed by: OPTOMETRIST

## 2025-04-09 NOTE — PROGRESS NOTES
HPI    73 Y/o male is here for routine eye exam with C/o pt say's sometimes with   Rx glasses on it has been hard to see small print on his phone. Pt say's   it is not all the times.   Pt denies pain and discomfort   No f/f    Eye med: Vevye BID   Refresh  QD-BID   Ivizia PM ointment - Qhs   Miebo  PRN     Last edited by Alice Cummings MA on 4/7/2025  2:07 PM.            Assessment /Plan     For exam results, see Encounter Report.    Presbyopia  Astigmatism of both eyes, unspecified type       Posterior vitreous detachment of both eyes  Pt feels still seeing floaters OD but not as often    Plan: doing well, stable PVD OU, observe no RT/RD    RTC immediately PRN (especially ANY change flashes, floaters, vision, visual field)      Pseudophakia of both eyes  Good lens position OU  Plan: Observation, update Mrx prn      Dry eye syndrome of both eyes   Meibomian gland dysfunction (MGD) of both eyes  Sleep apnea, unspecified type    Lid scrubs QAM  Miebo OS  Vevye BID OD  Refresh PRN  ointment QHS OD       RTC 1 year

## 2025-04-09 NOTE — PROGRESS NOTES
Outpatient Rehab    Physical Therapy Progress Note    Patient Name: Raffy Rutherford Jr.  MRN: [id number]  YOB: 1950  Encounter Date: 3/27/2025    Therapy Diagnosis:   No diagnosis found.    Physician: Lisandra Trinh NP    Physician Orders: Eval and Treat  Medical Diagnosis: Foot drop, right foot [M21.371], Foot drop, left foot [M21.372], Radiculopathy, site unspecified [M54.10]     Visit # / Visits Authorized:  3 / 20   Date of Evaluation:  2/3/2025  Insurance Authorization Period: 2/3/2025 to 4/11/2025  Plan of Care Certification:  2/4/2025 to 4/14/2025      Time In:     Time Out:    Total Time:     Total Billable Time: 60 minutes    FOTO: See media section.        Subjective             Objective           Treatment:       therapeutic exercises to develop strength, endurance, ROM, flexibility, posture, and core stabilization for 30 minutes including:  NuStep 3'/3' (fwd/bckwd)  Standing T-Rotation 2 x 10  Shoulder Ext Row 2 x 10  LTRs 3 x 30'  Hamstring Stretch 3 x 30'  ABDIEL Stretch 3 x 30'  Bridges 3 x 10  Sciatic Nerve Glides 3 x 10    manual therapy techniques for 15 minutes, including:  FDN to Lumbar Spine using 6 40 mm needles. 3 mhz e-stim was used. NP  STM to Lumbar Spine, SI Joint  Grd III/IV Joint Mobs to Lumbar Spine, SI Joint  Grd IV Joint Mobs to Lumbar Spine     NMR for 15:  NBOS on Airex  Tandem Stance on Airex  Sit to Stand w/ Airex    Assessment & Plan   Assessment:         Patient will continue to benefit from skilled outpatient physical therapy to address the deficits listed in the problem list box on initial evaluation, provide pt/family education and to maximize pt's level of independence in the home and community environment.     Patient's spiritual, cultural, and educational needs considered and patient agreeable to plan of care and goals.           Plan:      Goals:   Active       Changing body position       Patient will demonstrate moving sit to stand without upper  extremity support for independence with transfers.  (Progressing)       Start:  02/04/25    Expected End:  04/14/25               Functional outcome       Patient will show a significant change in FOTO patient-reported outcome tool to demonstrate subjective improvement (Progressing)       Start:  02/04/25    Expected End:  04/14/25            Patient will demonstrate independence in home program for support of progression (Progressing)       Start:  02/04/25    Expected End:  03/14/25               Pain       Patient will report a 2 point reduction in pain while performing ambulation (Progressing)       Start:  02/04/25    Expected End:  03/14/25               Strength       Patient will achieve bilateral knee extension strength of 4/5 (Progressing)       Start:  02/04/25    Expected End:  04/14/25            Patient will achieve bilateral ankle dorsiflexion strength of 4/5 (Progressing)       Start:  02/04/25    Expected End:  04/14/25                Claude Roberson, PT, DPT

## 2025-04-10 ENCOUNTER — OFFICE VISIT (OUTPATIENT)
Dept: OTOLARYNGOLOGY | Facility: CLINIC | Age: 75
End: 2025-04-10
Payer: MEDICARE

## 2025-04-10 DIAGNOSIS — H61.23 BILATERAL IMPACTED CERUMEN: Primary | ICD-10-CM

## 2025-04-10 PROCEDURE — 99999 PR PBB SHADOW E&M-EST. PATIENT-LVL III: CPT | Mod: PBBFAC,,, | Performed by: NURSE PRACTITIONER

## 2025-04-10 NOTE — PROCEDURES
Ear Cerumen Removal    Date/Time: 4/10/2025 9:00 AM    Performed by: Cheyenne Li NP  Authorized by: Cheyenne Li NP      Local anesthetic:  None  Location details:  Both ears  Procedure type: curette    Cerumen  Removal Results:  Cerumen completely removed  Patient tolerance:  Patient tolerated the procedure well with no immediate complications     Procedure Note:    The patient was brought to the minor procedure room and placed under the operating microscope of the right ear canal which was cleaned of ceruminous debris. Using a combination of suction, curettes and cup forceps the patient's cerumen was removed. The patient tolerated the procedure well. There were no complications.    Procedure Note:    The patient was brought to the minor procedure room and placed under the operating microscope of the left ear canal which was cleaned of ceruminous debris. Using a combination of suction, curettes and cup forceps the patient's cerumen was removed.  The patient tolerated the procedure well. There were no complications.      Routine 4 month ear cleaning. Notes that in the past few weeks he was experiencing tinnitus in the right ear.     Tinnitus resolved after cleaning.     He would like to RTC in 3-4 months for routine cleaning.

## 2025-04-11 NOTE — PROGRESS NOTES
Outpatient Rehab    Physical Therapy Progress Note    Patient Name: Raffy Rutherford Jr.  MRN: [id number]  YOB: 1950  Encounter Date: 4/1/2025    Therapy Diagnosis:   No diagnosis found.    Physician: Lisandra Trinh NP    Physician Orders: Eval and Treat  Medical Diagnosis: Foot drop, right foot [M21.371], Foot drop, left foot [M21.372], Radiculopathy, site unspecified [M54.10]     Visit # / Visits Authorized:  5 / 20   Date of Evaluation:  2/3/2025  Insurance Authorization Period: 2/3/2025 to 4/11/2025  Plan of Care Certification:  2/4/2025 to 4/14/2025      Time In:     Time Out:    Total Time:     Total Billable Time: 60 minutes    FOTO: See media section.        Subjective             Objective           Treatment:       therapeutic exercises to develop strength, endurance, ROM, flexibility, posture, and core stabilization for 30 minutes including:  NuStep 3'/3' (fwd/bckwd)  Standing T-Rotation 2 x 10  Shoulder Ext Row 2 x 10  LTRs 3 x 30'  Hamstring Stretch 3 x 30'  ABDIEL Stretch 3 x 30'  Bridges 3 x 10  Sciatic Nerve Glides 3 x 10    manual therapy techniques for 15 minutes, including:  FDN to Lumbar Spine using 6 40 mm needles. 3 mhz e-stim was used. NP  STM to Lumbar Spine, SI Joint  Grd III/IV Joint Mobs to Lumbar Spine, SI Joint  Grd IV Joint Mobs to Lumbar Spine     NMR for 15:  NBOS on Airex  Tandem Stance on Airex  Sit to Stand w/ Airex    Assessment & Plan   Assessment:         Patient will continue to benefit from skilled outpatient physical therapy to address the deficits listed in the problem list box on initial evaluation, provide pt/family education and to maximize pt's level of independence in the home and community environment.     Patient's spiritual, cultural, and educational needs considered and patient agreeable to plan of care and goals.           Plan:      Goals:   Active       Changing body position       Patient will demonstrate moving sit to stand without upper  extremity support for independence with transfers.  (Progressing)       Start:  02/04/25    Expected End:  04/14/25               Functional outcome       Patient will show a significant change in FOTO patient-reported outcome tool to demonstrate subjective improvement (Progressing)       Start:  02/04/25    Expected End:  04/14/25            Patient will demonstrate independence in home program for support of progression (Progressing)       Start:  02/04/25    Expected End:  03/14/25               Pain       Patient will report a 2 point reduction in pain while performing ambulation (Progressing)       Start:  02/04/25    Expected End:  03/14/25               Strength       Patient will achieve bilateral knee extension strength of 4/5 (Progressing)       Start:  02/04/25    Expected End:  04/14/25            Patient will achieve bilateral ankle dorsiflexion strength of 4/5 (Progressing)       Start:  02/04/25    Expected End:  04/14/25                Claude Roberson, PT, DPT

## 2025-04-29 ENCOUNTER — PATIENT MESSAGE (OUTPATIENT)
Dept: INTERNAL MEDICINE | Facility: CLINIC | Age: 75
End: 2025-04-29
Payer: MEDICARE

## 2025-04-29 ENCOUNTER — PROCEDURE VISIT (OUTPATIENT)
Dept: NEUROLOGY | Facility: CLINIC | Age: 75
End: 2025-04-29
Payer: MEDICARE

## 2025-04-29 ENCOUNTER — PATIENT MESSAGE (OUTPATIENT)
Dept: ORTHOPEDICS | Facility: CLINIC | Age: 75
End: 2025-04-29
Payer: MEDICARE

## 2025-04-29 VITALS
WEIGHT: 173.94 LBS | BODY MASS INDEX: 26.45 KG/M2 | DIASTOLIC BLOOD PRESSURE: 72 MMHG | HEART RATE: 61 BPM | SYSTOLIC BLOOD PRESSURE: 114 MMHG

## 2025-04-29 DIAGNOSIS — M48.061 SPINAL STENOSIS, LUMBAR REGION, WITHOUT NEUROGENIC CLAUDICATION: ICD-10-CM

## 2025-04-29 DIAGNOSIS — M51.26 DISPLACEMENT OF LUMBAR INTERVERTEBRAL DISC WITHOUT MYELOPATHY: ICD-10-CM

## 2025-04-29 DIAGNOSIS — G43.709 CHRONIC MIGRAINE WITHOUT AURA WITHOUT STATUS MIGRAINOSUS, NOT INTRACTABLE: Primary | ICD-10-CM

## 2025-04-29 DIAGNOSIS — M47.819 SPONDYLOSIS WITHOUT MYELOPATHY: Primary | ICD-10-CM

## 2025-04-29 DIAGNOSIS — G43.909 MIGRAINE WITHOUT STATUS MIGRAINOSUS, NOT INTRACTABLE, UNSPECIFIED MIGRAINE TYPE: ICD-10-CM

## 2025-04-29 PROCEDURE — 64615 CHEMODENERV MUSC MIGRAINE: CPT | Mod: S$GLB,,, | Performed by: NURSE PRACTITIONER

## 2025-04-29 RX ORDER — BUTALBITAL, ACETAMINOPHEN AND CAFFEINE 50; 325; 40 MG/1; MG/1; MG/1
1 TABLET ORAL DAILY PRN
Qty: 15 TABLET | Refills: 0 | Status: SHIPPED | OUTPATIENT
Start: 2025-04-29

## 2025-04-29 NOTE — PROCEDURES
PROCEDURE NOTE:  BOTOX was performed as an indicated therapy for intractable chronic migraine headaches given that the patient failed more than 2 headache medications    A time out was conducted just before the start of the procedure to verify the correct patient and procedure, procedure location, and all relevant critical information.     The Botox injections have achieved well over 50%  improvement in the patient's symptoms. Migraines have been reduced at least 7 days per month and the number of cumulative hours suffering with headaches has been reduced at least 100 total hours per month. Today she does have a headache indicating that the Botox has worn off. Frequency of treatment is every 12 weeks unless no response to the treatments, at which time we will discontinue the injections.    PROCEDURE PERFORMED: Botulinum toxin injection (82917)  CLINICAL INDICATION: G43.719  After risks and benefits were explained including bleeding, infection, worsening of pain, damage to the areas being injected, weakness of muscles, loss of muscle control, dysphagia if injecting the head or neck, facial droop if injecting the facial area, painful injection, allergic or other reaction to the medications being injected, and the failure of the procedure to help the problem, a signed consent was obtained.   The patient was placed in a comfortable area and the sites to be treated were identified.The area to be treated was prepped three times with alcohol and the alcohol allowed to dry. Next, a 30 gauge needle was used to inject the medication in the area to be treated.      Total Botox used: 155 Units   Unavoidable waste: 45 Units     Injection sites:    muscle bilaterally ( a total of 10 units divided into 2 sites)   Procerus muscle (5 units)   Frontalis muscle bilaterally (a total of 20 units divided into 4 sites)   Temporalis muscle bilaterally (a total of 40 units divided into 8 sites)   Occipitalis muscle bilaterally (a  total of 30 units divided into 6 sites)   Cervical paraspinal muscles (a total of 20 units divided into 4 sites)   Trapezius muscle bilaterally (a total of 30 units divided into 6 sites)   Complications: none   RTC for the next Botox injection: 12 weeks     CC: Haseeb Puentes MD Elizabeth C Vulevich, FNP-C  [facility]   [phone]

## 2025-04-30 ENCOUNTER — TELEPHONE (OUTPATIENT)
Dept: INTERNAL MEDICINE | Facility: CLINIC | Age: 75
End: 2025-04-30
Payer: MEDICARE

## 2025-04-30 ENCOUNTER — PATIENT MESSAGE (OUTPATIENT)
Dept: NEUROLOGY | Facility: CLINIC | Age: 75
End: 2025-04-30
Payer: MEDICARE

## 2025-05-01 DIAGNOSIS — M72.0 DUPUYTREN'S CONTRACTURE OF BOTH HANDS: Primary | ICD-10-CM

## 2025-05-01 NOTE — TELEPHONE ENCOUNTER
Called pt to get clarity on what he was seeking a referral to. He stated that he has seen PT [facility] and has an upcoming appointment.     States that similar to digital medicine he was informed about a program that allows you to perform your PT exercises at home    He stated that he has received a message from someone and is already performing PT exercises at home, pt stated that he would contact office again if further assistance

## 2025-05-01 NOTE — TELEPHONE ENCOUNTER
See pts reply. Outpt PT referral pended for your review. Pt is also asking for digital connected back program

## 2025-05-01 NOTE — TELEPHONE ENCOUNTER
Any idea what the order for Digital Connected back is? I cannot find it and I have never ordered it

## 2025-05-08 ENCOUNTER — PATIENT MESSAGE (OUTPATIENT)
Dept: INTERNAL MEDICINE | Facility: CLINIC | Age: 75
End: 2025-05-08
Payer: MEDICARE

## 2025-05-09 ENCOUNTER — PATIENT MESSAGE (OUTPATIENT)
Dept: ORTHOPEDICS | Facility: CLINIC | Age: 75
End: 2025-05-09
Payer: MEDICARE

## 2025-05-14 ENCOUNTER — HOSPITAL ENCOUNTER (OUTPATIENT)
Dept: RADIOLOGY | Facility: OTHER | Age: 75
Discharge: HOME | End: 2025-05-14
Attending: ORTHOPAEDIC SURGERY
Payer: MEDICARE

## 2025-05-14 ENCOUNTER — PATIENT MESSAGE (OUTPATIENT)
Dept: ORTHOPEDICS | Facility: CLINIC | Age: 75
End: 2025-05-14

## 2025-05-14 ENCOUNTER — OFFICE VISIT (OUTPATIENT)
Dept: ORTHOPEDICS | Facility: CLINIC | Age: 75
End: 2025-05-14
Payer: MEDICARE

## 2025-05-14 DIAGNOSIS — M54.2 CERVICAL SPINE PAIN: ICD-10-CM

## 2025-05-14 DIAGNOSIS — M72.0 DUPUYTREN'S DISEASE OF PALM OF RIGHT HAND: ICD-10-CM

## 2025-05-14 DIAGNOSIS — R20.0 BILATERAL HAND NUMBNESS: ICD-10-CM

## 2025-05-14 DIAGNOSIS — M79.641 CHRONIC PAIN OF RIGHT HAND: ICD-10-CM

## 2025-05-14 DIAGNOSIS — M72.0 DUPUYTREN'S CONTRACTURE OF BOTH HANDS: ICD-10-CM

## 2025-05-14 DIAGNOSIS — M65.331 TRIGGER MIDDLE FINGER OF RIGHT HAND: Primary | ICD-10-CM

## 2025-05-14 DIAGNOSIS — G89.29 CHRONIC PAIN OF RIGHT HAND: ICD-10-CM

## 2025-05-14 PROCEDURE — 1159F MED LIST DOCD IN RCRD: CPT | Mod: CPTII,S$GLB,, | Performed by: ORTHOPAEDIC SURGERY

## 2025-05-14 PROCEDURE — 1160F RVW MEDS BY RX/DR IN RCRD: CPT | Mod: CPTII,S$GLB,, | Performed by: ORTHOPAEDIC SURGERY

## 2025-05-14 PROCEDURE — 99999 PR PBB SHADOW E&M-EST. PATIENT-LVL IV: CPT | Mod: PBBFAC,,, | Performed by: ORTHOPAEDIC SURGERY

## 2025-05-14 PROCEDURE — 1101F PT FALLS ASSESS-DOCD LE1/YR: CPT | Mod: CPTII,S$GLB,, | Performed by: ORTHOPAEDIC SURGERY

## 2025-05-14 PROCEDURE — 20550 NJX 1 TENDON SHEATH/LIGAMENT: CPT | Mod: RT,S$GLB,, | Performed by: ORTHOPAEDIC SURGERY

## 2025-05-14 PROCEDURE — 99214 OFFICE O/P EST MOD 30 MIN: CPT | Mod: 25,S$GLB,, | Performed by: ORTHOPAEDIC SURGERY

## 2025-05-14 PROCEDURE — 73130 X-RAY EXAM OF HAND: CPT | Mod: TC,50,FY

## 2025-05-14 PROCEDURE — 1125F AMNT PAIN NOTED PAIN PRSNT: CPT | Mod: CPTII,S$GLB,, | Performed by: ORTHOPAEDIC SURGERY

## 2025-05-14 PROCEDURE — 1157F ADVNC CARE PLAN IN RCRD: CPT | Mod: CPTII,S$GLB,, | Performed by: ORTHOPAEDIC SURGERY

## 2025-05-14 PROCEDURE — 3288F FALL RISK ASSESSMENT DOCD: CPT | Mod: CPTII,S$GLB,, | Performed by: ORTHOPAEDIC SURGERY

## 2025-05-14 RX ORDER — METHYLPREDNISOLONE ACETATE 40 MG/ML
40 INJECTION, SUSPENSION INTRA-ARTICULAR; INTRALESIONAL; INTRAMUSCULAR; SOFT TISSUE
Status: DISCONTINUED | OUTPATIENT
Start: 2025-05-14 | End: 2025-05-14 | Stop reason: HOSPADM

## 2025-05-14 RX ADMIN — METHYLPREDNISOLONE ACETATE 40 MG: 40 INJECTION, SUSPENSION INTRA-ARTICULAR; INTRALESIONAL; INTRAMUSCULAR; SOFT TISSUE at 08:05

## 2025-05-14 NOTE — PROGRESS NOTES
"Raffy Rutherford Jr. presents for follow up evaluation of   Encounter Diagnoses   Name Primary?    Dupuytren's disease of palm of right hand     Trigger middle finger of right hand Yes    Cervical spine pain     Chronic pain of right hand     Bilateral hand numbness      History of Present Illness    HPI:  Patient presents with concerns about increased frequency of dropping objects and slight numbness in fingertips. He reports waking up with the right long finger, specifically in the knuckle area, which only occurs in the morning and typically improves throughout the day. His Dupuytren's disease seems to be worsening, described as "bubbling up" in his right small finger.  He reports occasional pain in the affected area, particularly when it is causing tethering in the right small finger.    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 He also reports discomfort and decreased range of motion in his neck, particularly when turning to the left.    He has a history of multiple medical interventions, including four back surgeries and bilateral carpal tunnel releases (right in 2021, left in 2020 performed by Dr. Bauer). A cervical MRI was performed in 2022.    His primary concern is the increased frequency of dropping objects, which he describes as causing significant distress. He denies any current bothersome symptoms in his hands aside from the dropping of objects. He mentions a friend who has been using a capsaicin patch for chronic neuropathy with significant improvement in sleep.    He denies any tingling when pressure is applied to the wrist, tingling in the thenar eminence, and current neck pain.    Vitals:    05/14/25 0811   PainSc:   1   PainLoc: Hand       PE:    AA&O x 4.  NAD  HEENT:  NCAT, sclera nonicteric  Lungs:  Respirations are equal and unlabored.  CV:  2+ bilateral upper and lower extremity pulses.      MSK:   RUE: right small finger dupuytrens cord, 15 " degree MP contracture,  right Middle finger: TTP over A1 pulley, palpable nodule, demonstrable catching and locking; 5/5 thenar and intrinsic musculature strength.  Otherwise, full range of motion hands, wrists and elbows. 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     [email or web address] upper extremities was normal     Assessment/Plan:   Encounter Diagnoses   Name Primary?    Dupuytren's disease of palm of right hand     Trigger middle finger of right hand Yes    Cervical spine pain     Chronic pain of right hand     Bilateral hand numbness      The patient and I had a thorough discussion today. We discussed the working diagnosis as well as several other potential alternative diagnoses. Treatment options were discussed, both conservative and surgical. Conservative treatment options would include things such as activity modifications, workplace modifications, a period of rest, oral vs topical OTC and prescription anti-inflammatory medications, occupational therapy, splinting/bracing, immobilization, corticosteroid injections, and others. Surgical options were discussed as well. Referral to back and spine for numbness and neck pathology.    -I have offered him a selective injection. I have explained the risks, benefits, and alternatives of the procedure in detail.  The patient voices understanding and all questions have been answered. The patient agrees to proceed as planned. So after a sterile prep of the skin in the normal fashion the right long flexor tendon sheath injected using a 25 gauge needle with a combination of 1cc 1% plain lidocaine and 40 mg of methylprednisolone.  The patient is cautioned and immediate  relief of pain is secondary to the local anesthetic and will be temporary.  After the anesthetic wears off there may be a increase in pain that may last for a few hours or a few days and they should use ice to help alleviate this flair up of pain. Patient tolerated the procedure well.    Will call in 2 weeks to follow up for steroid injection efficacy or sooner for any worsening of symptoms  Call with any questions/concerns in the interim      FOLLOW UP:  - Follow up in 6 months for Dupuytren's disease monitoring, or sooner if unable to flatten hand on table.            Kaye Solis MD    Please be aware that this note has been generated with the assistance of [employer] voice-to-text.  Please excuse any spelling or grammatical errors.  This note was generated with the assistance of ambient listening technology. Verbal consent was obtained by the patient and accompanying visitor(s) for the recording of patient appointment to facilitate this note. I attest to having reviewed and edited the generated note for accuracy, though some syntax or spelling errors may persist. Please contact the author of this note for any clarification.

## 2025-05-14 NOTE — PROCEDURES
Tendon Sheath    Date/Time: 5/14/2025 8:00 AM    Performed by: Kaye Solis MD  Authorized by: Kaye Solis MD    Consent Done?:  Yes (Verbal)  Indications:  Pain  Timeout: prior to procedure the correct patient, procedure, and site was verified    Prep: patient was prepped and draped in usual sterile fashion      Local anesthesia used?: Yes    Anesthesia:  Local infiltration  Local anesthetic:  Lidocaine 1% without epinephrine  Anesthetic total (ml):  1    Location:  Long finger  Site:  R long flexor tendon sheath  Ultrasonic guidance for needle placement?: No    Needle size:  25 G  Approach:  Volar  Medications:  40 mg methylPREDNISolone acetate 40 mg/mL  Patient tolerance:  Patient tolerated the procedure well with no immediate complications

## 2025-05-15 ENCOUNTER — OFFICE VISIT (OUTPATIENT)
Dept: ORTHOPEDICS | Facility: CLINIC | Age: 75
End: 2025-05-15
Payer: MEDICARE

## 2025-05-15 ENCOUNTER — TELEPHONE (OUTPATIENT)
Facility: CLINIC | Age: 75
End: 2025-05-15
Payer: MEDICARE

## 2025-05-15 ENCOUNTER — HOSPITAL ENCOUNTER (OUTPATIENT)
Dept: RADIOLOGY | Facility: HOSPITAL | Age: 75
Discharge: HOME | End: 2025-05-15
Attending: ORTHOPAEDIC SURGERY
Payer: MEDICARE

## 2025-05-15 VITALS — HEIGHT: 68 IN | BODY MASS INDEX: 26.36 KG/M2 | WEIGHT: 173.94 LBS

## 2025-05-15 DIAGNOSIS — M54.2 CERVICAL SPINE PAIN: ICD-10-CM

## 2025-05-15 DIAGNOSIS — R20.0 NUMBNESS IN BOTH HANDS: Primary | ICD-10-CM

## 2025-05-15 DIAGNOSIS — M50.30 DDD (DEGENERATIVE DISC DISEASE), CERVICAL: ICD-10-CM

## 2025-05-15 DIAGNOSIS — M50.30 DDD (DEGENERATIVE DISC DISEASE), CERVICAL: Primary | ICD-10-CM

## 2025-05-15 PROCEDURE — 99999 PR PBB SHADOW E&M-EST. PATIENT-LVL IV: CPT | Mod: PBBFAC,,, | Performed by: ORTHOPAEDIC SURGERY

## 2025-05-15 PROCEDURE — 72050 X-RAY EXAM NECK SPINE 4/5VWS: CPT | Mod: TC

## 2025-05-15 RX ORDER — PREGABALIN 50 MG/1
50 CAPSULE ORAL DAILY
Qty: 30 CAPSULE | Refills: 6 | Status: SHIPPED | OUTPATIENT
Start: 2025-05-15 | End: 2025-12-11

## 2025-05-15 NOTE — TELEPHONE ENCOUNTER
Pt informed.   Appt with Meeta Nichols cancelled.   Verbally reached out to Neuromuscular department to reschedule.

## 2025-05-15 NOTE — PROGRESS NOTES
DATE: 5/15/2025  PATIENT: Raffy Rutherford Jr.    Supervising Physician: Pedro Sherman M.D.    CHIEF COMPLAINT: numbness and clumsiness in hands    HISTORY:  Raffy Rutherford Jr. is a 75 y.o. male with a pmhx of Charcot Selena Tooth here for initial evaluation of numbness and tingling in his hands. This has been present for 6 weeks without specific injury. There is no pain. Pt says he finds he is dropping objects recently. Prior treatments have included no previous neck treatment, no PT, ESIs, surgery. Pt was seen by Dr. Solis with hand surgery yesterday for similar complaints and was sent here for C spine evaluation. He currently takes lyrica 75 mg nightly    The patient ENDORSES mild myelopathic symptoms such as handwriting changes or difficulty with buttons/coins/keys. 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 Denies perineal paresthesias, bowel/bladder dysfunction.    PAST MEDICAL/SURGICAL HISTORY:  Past Medical History:   Diagnosis Date    Allergy     Arthritis     Back pain     after trauma beginning in 195    Cataract     Chronic pain     neck and left shoulder    Cluster headache 2013    Colon polyps 2007 2007-2019: TA x5, HP x3    Degenerative disc disease     Depression     Diverticulitis 12/2013    Dry eye syndrome     Fibromyalgia 2013    GERD (gastroesophageal reflux disease)     Hepatitis 1970's    A    History of prostate biopsy 2002    Hyperlipidemia     Joint pain     Prostate cancer 07/2021    PVD (posterior vitreous detachment)     Salzmann's nodular dystrophy of left eye     Sleep apnea     Thyroid nodule 07/16/2014    Trigeminal neuralgia of left side of face     Tubular adenoma of colon 2007    5 removed [id number]    Visual disturbance 2012    problems after cataract surgery     Past Surgical History:   Procedure Laterality Date    ARTHROSCOPIC DEBRIDEMENT OF SHOULDER Left 04/19/2024    Procedure: DEBRIDEMENT, SHOULDER, ARTHROSCOPIC;  Surgeon: GEORGIANA Up MD;  Location:  [facility] OR;  Service: Orthopedics;  Laterality: Left;  Regional w/o Catheter, Interscalene, 0.5% Marcaine Plain    ARTHROSCOPIC REPAIR OF ROTATOR CUFF OF SHOULDER Left 04/19/2024    Procedure: REPAIR, ROTATOR CUFF, ARTHROSCOPIC;  Surgeon: GEORGIANA Up MD;  Location: [facility] OR;  Service: Orthopedics;  Laterality: Left;    ARTHROSCOPY OF SHOULDER WITH DECOMPRESSION OF SUBACROMIAL SPACE Left 04/19/2024    Procedure: ARTHROSCOPY, SHOULDER, WITH SUBACROMIAL SPACE DECOMPRESSION;  Surgeon: GEORGIANA Up MD;  Location: [facility] OR;  Service: Orthopedics;  Laterality: Left;    ARTHROSCOPY,SHOULDER,WITH BICEPS TENODESIS Left 04/19/2024    Procedure: ARTHROSCOPY,SHOULDER,WITH BICEPS TENODESIS;  Surgeon: GEORGIANA Up MD;  Location: [facility] OR;  Service: Orthopedics;  Laterality: Left;    BACK SURGERY      CARPAL TUNNEL RELEASE Right 05/18/2021    Procedure: RELEASE, CARPAL TUNNEL;  Surgeon: Kaye Solis MD;  Location: [facility] OR;  Service: Orthopedics;  Laterality: Right;    CATARACT EXTRACTION W/  INTRAOCULAR LENS IMPLANT Bilateral     CHOLECYSTECTOMY      COLONOSCOPY N/A 12/17/2019    Normal - Repeat 5yrs    COLONOSCOPY  2007 2007 TA x2, 2011 TA x3, 2014 HP x3, 2019 normal    COLONOSCOPY N/A 2/24/2025    Procedure: COLONOSCOPY;  Surgeon: Durga Harvey MD;  Location: [facility] ([facility]);  Service: Endoscopy;  Laterality: N/A;  11/26 ref by GARO Quick, Suflave, instructions handed to pt in ofc and portal. [email or web address]  Jm/pt wife Valarie Rutherford rescheduled to an earlier time/ prep inst given in office / Changed prep to miralax /referral JACK barlow  Pt rescheduled to due to new ins/pt moved procedure to      COSMETIC SURGERY  02/10/2015    Direct mid-forehead brow lift    COSMETIC SURGERY  02/10/2015    Bilateral upper lid blepahroplasty    CYSTOSCOPY WITH URETEROSCOPY, RETROGRADE PYELOGRAPHY, AND INSERTION OF STENT Left 08/19/2020    Procedure: CYSTOSCOPY, WITH RETROGRADE PYELOGRAM AND URETERAL STENT INSERTION;  Surgeon:  Katelynn George MD;  Location: [facility];  Service: Urology;  Laterality: Left;    EPIDURAL STEROID INJECTION INTO LUMBAR SPINE Bilateral 3/10/2025    Procedure: Bilateral L2-3 TFESI;  Surgeon: Herman Palomino DO;  Location: [facility];  Service: Pain Management;  Laterality: Bilateral;  no sed-no ac    ESOPHAGOGASTRODUODENOSCOPY N/A 12/17/2019    Procedure: ESOPHAGOGASTRODUODENOSCOPY (EGD);  Surgeon: Amadou Hardin MD;  Location: [facility] (4TH [facility]);  Service: Endoscopy;  Laterality: N/A;    ESOPHAGOGASTRODUODENOSCOPY N/A 2/24/2025    Procedure: EGD (ESOPHAGOGASTRODUODENOSCOPY);  Surgeon: Durga Harvey MD;  [facility]  Service: Endoscopy;  Laterality: N/A;    EYE SURGERY      FUSION OF LUMBAR SPINE BY ANTERIOR APPROACH N/A 08/20/2020    Procedure: FUSION, SPINE, LUMBAR, ANTERIOR APPROACH L5-S1 ALIF Stand Alone;  Surgeon: Jason Caldwell MD;  Location: [facility];  Service: Neurosurgery;  Laterality: N/A;    HEMORRHOID SURGERY      with complication of chronic bleeding for 6 weeks     INJECTION OF STEROID Right 12/06/2018    Procedure: INJECTION, STEROID Right SI Joint Block and Steroid Injection;  Surgeon: Jason Caldwell MD;  Location: [facility];  Service: Neurosurgery;  Laterality: Right;  Procedure: Right SI Joint Block and Steroid Injection  Surgery Time: 30 MIN  LOS: 0  Anesthesia: MAC  Radiology: C-arm  Bed: [place] 4 Poster  Position: Prone    INJECTION OF STEROID Right 09/19/2019    Procedure: INJECTION, STEROID Procedure: Right SI joint block nd steroid injection;  Surgeon: Jason Caldwell MD;  Location: [facility];  Service: Neurosurgery;  Laterality: Right;  Procedure: Right SI joint block nd steroid injection  Surgery Time: 30 mins  LOS:   Anesthesia: General MAC  Radiology:C-arm  Bed: Regular Bed  Position: Prone    IRRIGATION AND DEBRIDEMENT OF UPPER EXTREMITY Left 04/07/2022    Procedure: IRRIGATION AND DEBRIDEMENT, UPPER EXTREMITY;  Surgeon: Kaye Solis MD;  Location:  [facility] OR;  Service: Orthopedics;  Laterality: Left;    JOINT REPLACEMENT      KNEE SURGERY      involving arthroscopic surgery to both knees    REPAIR OF EXTENSOR TENDON Left 04/07/2022    Procedure: REPAIR, TENDON, EXTENSOR thumb, EPB and EPL;  Surgeon: Kaye Solis MD;  Location: [facility] OR;  Service: Orthopedics;  Laterality: Left;    SHOULDER SURGERY      SINUS SURGERY      left molar and sinus surgery for trigeminal neuralgia    SPINAL FUSION  06/22/2015    L3-L5 XLIF/TANA    TOTAL HIP ARTHROPLASTY  04/2012    Pt states he had total hip replacement on his left hip.    ULNAR NERVE TRANSPOSITION Left 12/16/2020    Procedure: TRANSPOSITION, NERVE, ULNAR - left carpal and cubital tunnel releases;  Surgeon: Addi Bauer MD;  Location: [facility] OR;  Service: Orthopedics;  Laterality: Left;       Medications:  Medications Ordered Prior to Encounter[1]    Social History: Social History[2]    REVIEW OF SYSTEMS:  Constitution: Negative. Negative for chills, fever and night sweats.   Cardiovascular: Negative for chest pain and syncope.   Respiratory: Negative for cough and shortness of breath.   Gastrointestinal: See HPI. Negative for nausea/vomiting. Negative for abdominal pain.  Genitourinary: See HPI. Negative for discoloration or dysuria.  Skin: Negative for dry skin, itching and rash.   Hematologic/Lymphatic: Negative for bleeding problem. Does not bruise/bleed easily.   Musculoskeletal: Negative for falls and muscle weakness.   Neurological: See HPI. No seizures.   Endocrine: Negative for polydipsia, polyphagia and polyuria.   Allergic/Immunologic: Negative for hives and persistent infections.  Psychiatric/Behavioral: Negative for depression and insomnia.         EXAM:  There were no vitals taken for this visit.    General: The patient is a very pleasant 75 y.o. male in no apparent distress, the patient is oriented to person, place and time.  Psych: Normal mood and affect  HEENT: Vision grossly intact, hearing  intact to the spoken word.  Lungs: Respirations unlabored.  Gait: Normal station and gait, no difficulty with toe or heel walk.   Skin: Cervical skin negative for rashes, lesions, hairy patches and surgical scars.  Range of motion: Cervical range of motion is acceptable. There is negative tenderness to palpation.  Spinal Balance: Global saggital and coronal spinal balance acceptable, no significant for scoliosis and kyphosis.  Musculoskeletal: No pain with the range of motion of the bilateral shoulders and elbows. Normal bulk and contour of the bilateral hands.  Vascular: Bilateral hands warm and well perfused, radial pulses 2+ bilaterally.  Neurological: Normal strength and tone in all major motor groups in the bilateral upper and lower extremities. Normal sensation to light touch in the C5-T1 and L2-S1 dermatomes bilaterally.  Deep tendon reflexes symmetric 2+ in the bilateral upper and lower extremities.  Negative Inverted Radial Reflex and De Leon's bilaterally. Negative Babinski bilaterally.     IMAGING:   Today I personally reviewed AP, Lat and Flex/Ex  upright C-spine films that demonstrate mild degenerative changes     There is no height or weight on file to calculate BMI.    Hemoglobin A1C   Date Value Ref Range Status   08/14/2024 5.5 4.0 - 5.6 % Final     Comment:     ADA Screening Guidelines:  5.7-6.4%  Consistent with prediabetes  >or=6.5%  Consistent with diabetes    High levels of fetal hemoglobin interfere with the HbA1C  assay. Heterozygous hemoglobin variants (HbS, HgC, etc)do  not significantly interfere with this assay.   However, presence of multiple variants may affect accuracy.     08/28/2023 5.5 4.0 - 5.6 % Final     Comment:     ADA Screening Guidelines:  5.7-6.4%  Consistent with prediabetes  >or=6.5%  Consistent with diabetes    High levels of fetal hemoglobin interfere with the HbA1C  assay. Heterozygous hemoglobin variants (HbS, HgC, etc)do  not significantly interfere with this assay.    However, presence of multiple variants may affect accuracy.     07/12/2022 5.6 4.0 - 5.6 % Final     Comment:     ADA Screening Guidelines:  5.7-6.4%  Consistent with prediabetes  >or=6.5%  Consistent with diabetes    High levels of fetal hemoglobin interfere with the HbA1C  assay. Heterozygous hemoglobin variants (HbS, HgC, etc)do  not significantly interfere with this assay.   However, presence of multiple variants may affect accuracy.             ASSESSMENT/PLAN:    There are no diagnoses linked to this encounter.    Today we discussed at length all of the different treatment options including anti-inflammatories, acetaminophen, rest, ice, heat, physical therapy including strengthening and stretching exercises, home exercises, ROM, aerobic conditioning, aqua therapy, other modalities including ultrasound, massage, and dry needling, epidural steroid injections and finally surgical intervention.      Pt presents with numbness and clumsiness with hands. Will obtain cervical MRI to further evaluate. Will increase lyrica to 50 mg daily and 75mg nightly.           [1]   Current Outpatient Medications on File Prior to Visit   Medication Sig Dispense Refill    atorvastatin (LIPITOR) 40 MG tablet Take 1 tablet (40 mg total) by mouth once daily. 90 tablet 1    butalbital-acetaminophen-caffeine -40 mg (FIORICET, ESGIC) -40 mg per tablet Take 1 tablet by mouth daily as needed for 30 days. 15 tablet 0    celecoxib (CELEBREX) 100 MG capsule Take 2 capsules (200 mg total) by mouth 2 (two) times daily. 180 capsule 3    clindamycin (CLEOCIN) 150 MG capsule Take 4 capsules 1 hour prior to dental appointment 12 capsule 1    clonazePAM (KLONOPIN) 0.5 MG tablet Take 1 tablet by mouth twice a day as needed for anxiety 60 tablet 3    cycloSPORINE (VEVYE) 0.1 % Drop Place 1 drop into both eyes 2 (two) times a day. 2 mL 11    ergocalciferol (VITAMIN D2) 50,000 unit Cap Take 1 capsule (50,000 Units total) by mouth every 7  days. 12 capsule 3    fremanezumab-vfrm (AJOVY SYRINGE) 225 mg/1.5 mL injection Inject 1.5 mLs (225 mg total) into the skin every 28 days. 1.5 mL 5    HYDROcodone-acetaminophen (NORCO) 5-325 mg per tablet Take 1 tablet by mouth every 8 (eight) hours as needed for Pain. 90 tablet 0    HYDROcodone-acetaminophen (NORCO) 7.5-325 mg per tablet Take 1 tablet by mouth 4 (four) times daily as needed. 120 tablet 0    hydrocortisone-pramoxine (ANALPRAM-HC) 2.5-1 % Crea Place rectally 3 (three) times daily. 30 g 2    ketoconazole (NIZORAL) 2 % cream Apply to affected areas of body folds twice daily as needed for irritation. 60 g 5    lamoTRIgine (LAMICTAL) 200 MG tablet Take 1 tablet (200 mg total) by mouth once daily. 90 tablet 3    methocarbamoL (ROBAXIN) 750 MG Tab Take 1 tablet (750 mg total) by mouth 3 (three) times daily. 60 tablet 2    perfluorohexyloctane, PF, (MIEBO, PF,) 100 % Drop Place 1 drop into both eyes 4 (four) times daily. 3 mL 11    pregabalin (LYRICA) 25 MG capsule Take 2 capsules (50 mg total) by mouth once daily. 90 capsule 1    pregabalin (LYRICA) 25 MG capsule Take 1 capsule (25 mg total) by mouth 4 (four) times daily. 360 capsule 0    pregabalin (LYRICA) 75 MG capsule Take 3 capsules (225 mg total) by mouth daily at night 270 capsule 0    RABEprazole (ACIPHEX) 20 mg tablet Take 1 tablet (20 mg total) by mouth 2 (two) times daily. 180 tablet 1    selegiline (EMSAM) 12 mg/24 hr Place 1 patch onto the skin once daily. 30 patch 11    selegiline (EMSAM) 9 mg/24 hr Place 1 patch onto the skin once daily. 30 patch 11    traZODone (DESYREL) 100 MG tablet Take 1 tablet (100 mg total) by mouth every evening. 90 tablet 6    triamcinolone acetonide 0.025% (KENALOG) 0.025 % cream Apply to affected areas of body folds twice daily as needed for irritation. Do not use for longer than 2 weeks in a row. 30 g 2    ubrogepant (UBRELVY) 100 mg tablet Take 1 tablet (100 mg total) by mouth every 2 (two) hours as needed for  Migraine. Max 200 mg/day. 16 tablet 5    ubrogepant (UBRELVY) 100 mg tablet Take 1 tablet (100 mg total) by mouth every 2 (two) hours as needed for Migraine. Max 200 mg/day. 16 tablet 5     No current facility-administered medications on file prior to visit.   [2]   Social History  Socioeconomic History    Marital status:    Occupational History    Occupation: Psychotherpist    Tobacco Use    Smoking status: Never    Smokeless tobacco: Never   Substance and Sexual Activity    Alcohol use: Yes     Alcohol/week: 5.0 standard drinks of alcohol     Types: 5 Glasses of wine per week     Comment: close to daily    Drug use: No    Sexual activity: Yes     Partners: Female     Social Drivers of Health     Financial Resource Strain: Low Risk  (7/25/2024)    Overall Financial Resource Strain (CARDIA)     Difficulty of Paying Living Expenses: Not hard at all   Food Insecurity: No Food Insecurity (7/25/2024)    Hunger Vital Sign     Worried About Running Out of Food in the Last Year: Never true     Ran Out of Food in the Last Year: Never true   Transportation Needs: No Transportation Needs (7/25/2024)    PRAPARE - Transportation     Lack of Transportation (Medical): No     Lack of Transportation (Non-Medical): No   Physical Activity: Insufficiently Active (7/25/2024)    Exercise Vital Sign     Days of Exercise per Week: 2 days     Minutes of Exercise per Session: 10 min   Stress: No Stress Concern Present (7/25/2024)    Citizen of [place] of Occupational Health - Occupational Stress Questionnaire     Feeling of Stress : Not at all   Housing Stability: Low Risk  (2/1/2024)    Housing Stability Vital Sign     Unable to Pay for Housing in the Last Year: No     Number of Places Lived in the Last Year: 1     Unstable Housing in the Last Year: No

## 2025-05-15 NOTE — TELEPHONE ENCOUNTER
----- Message from Meeta Nichols PA-C sent at 5/14/2025 11:21 AM CDT -----  Patient self scheduled with me and needs to see neuromuscular. Please reschedule

## 2025-05-16 ENCOUNTER — PATIENT MESSAGE (OUTPATIENT)
Dept: INTERNAL MEDICINE | Facility: CLINIC | Age: 75
End: 2025-05-16
Payer: MEDICARE

## 2025-05-16 DIAGNOSIS — G60.9 IDIOPATHIC PERIPHERAL NEUROPATHY: ICD-10-CM

## 2025-05-16 DIAGNOSIS — M21.371 FOOT DROP, BILATERAL: ICD-10-CM

## 2025-05-16 DIAGNOSIS — M21.372 FOOT DROP, BILATERAL: ICD-10-CM

## 2025-05-16 DIAGNOSIS — G60.0 CMT (CHARCOT-MARIE-TOOTH DISEASE): Primary | ICD-10-CM

## 2025-05-19 NOTE — TELEPHONE ENCOUNTER
Pt requesting PCP order AFO braces, states Dr Rendon ordered his last pair years ago. Unable to pend, do not see it in the system. Pt requesting to fax one to [facility] [phone]      LOV with Haseeb Puentes MD , 8/8/2024

## 2025-05-25 ENCOUNTER — HOSPITAL ENCOUNTER (OUTPATIENT)
Dept: RADIOLOGY | Facility: HOSPITAL | Age: 75
Discharge: HOME | End: 2025-05-25
Attending: ORTHOPAEDIC SURGERY
Payer: MEDICARE

## 2025-05-25 DIAGNOSIS — R20.0 NUMBNESS IN BOTH HANDS: ICD-10-CM

## 2025-05-25 PROCEDURE — 72141 MRI NECK SPINE W/O DYE: CPT | Mod: 26,,, | Performed by: RADIOLOGY

## 2025-05-25 PROCEDURE — 72141 MRI NECK SPINE W/O DYE: CPT | Mod: TC

## 2025-05-27 ENCOUNTER — RESULTS FOLLOW-UP (OUTPATIENT)
Dept: ORTHOPEDICS | Facility: CLINIC | Age: 75
End: 2025-05-27

## 2025-05-27 ENCOUNTER — TELEPHONE (OUTPATIENT)
Dept: ORTHOPEDICS | Facility: CLINIC | Age: 75
End: 2025-05-27
Payer: MEDICARE

## 2025-05-27 DIAGNOSIS — F33.1 MODERATE EPISODE OF RECURRENT MAJOR DEPRESSIVE DISORDER: ICD-10-CM

## 2025-05-27 DIAGNOSIS — G43.909 MIGRAINE WITHOUT STATUS MIGRAINOSUS, NOT INTRACTABLE, UNSPECIFIED MIGRAINE TYPE: ICD-10-CM

## 2025-05-27 RX ORDER — HYDROCODONE BITARTRATE AND ACETAMINOPHEN 5; 325 MG/1; MG/1
1 TABLET ORAL EVERY 8 HOURS PRN
Qty: 90 TABLET | Refills: 0 | Status: SHIPPED | OUTPATIENT
Start: 2025-05-27

## 2025-05-27 RX ORDER — BUTALBITAL, ACETAMINOPHEN AND CAFFEINE 50; 325; 40 MG/1; MG/1; MG/1
1 TABLET ORAL DAILY PRN
Qty: 15 TABLET | Refills: 0 | Status: SHIPPED | OUTPATIENT
Start: 2025-05-27

## 2025-05-27 NOTE — TELEPHONE ENCOUNTER
Spoke with pt over the phone regarding MRI results. MRI cervical looks ok with no severe stenosis. Will message neuro  about getting pt into neuromuscular clinic for known charcot vinny tooth

## 2025-05-27 NOTE — TELEPHONE ENCOUNTER
Care Due:                  Date            Visit Type   Department     Provider  --------------------------------------------------------------------------------                                MYCHART                              ANNUAL                              CHECKUP/PHY  [facility]  Last Visit: 08-      [facility]       Haseeb Puentes  Next Visit: None Scheduled  None         None Found                                                            Last  Test          Frequency    Reason                     Performed    Due Date  --------------------------------------------------------------------------------    Office Visit  12 months..  celecoxib, ergocalciferol  08- 08-    CBC.........  12 months..  celecoxib................  08- 08-    CMP.........  12 months..  atorvastatin, celecoxib,   08- 08-                             ergocalciferol...........    Lipid Panel.  12 months..  atorvastatin.............  08- 08-    Vitamin D...  12 months..  ergocalciferol...........  08- 08-    [employer] Embedded Care Due Messages. Reference number: [id number].   5/27/2025 11:59:30 AM CDT

## 2025-05-28 ENCOUNTER — PATIENT MESSAGE (OUTPATIENT)
Dept: PAIN MEDICINE | Facility: CLINIC | Age: 75
End: 2025-05-28
Payer: MEDICARE

## 2025-05-30 ENCOUNTER — TELEPHONE (OUTPATIENT)
Facility: CLINIC | Age: 75
End: 2025-05-30
Payer: MEDICARE

## 2025-06-03 ENCOUNTER — PATIENT MESSAGE (OUTPATIENT)
Dept: INTERNAL MEDICINE | Facility: CLINIC | Age: 75
End: 2025-06-03
Payer: MEDICARE

## 2025-06-03 ENCOUNTER — CLINICAL SUPPORT (OUTPATIENT)
Dept: REHABILITATION | Facility: HOSPITAL | Age: 75
End: 2025-06-03
Payer: MEDICARE

## 2025-06-03 ENCOUNTER — PATIENT MESSAGE (OUTPATIENT)
Dept: PAIN MEDICINE | Facility: CLINIC | Age: 75
End: 2025-06-03
Payer: MEDICARE

## 2025-06-03 DIAGNOSIS — M47.819 SPONDYLOSIS WITHOUT MYELOPATHY: Primary | ICD-10-CM

## 2025-06-03 DIAGNOSIS — M48.061 SPINAL STENOSIS, LUMBAR REGION, WITHOUT NEUROGENIC CLAUDICATION: ICD-10-CM

## 2025-06-03 DIAGNOSIS — G89.29 CHRONIC LEFT-SIDED LOW BACK PAIN WITH LEFT-SIDED SCIATICA: ICD-10-CM

## 2025-06-03 DIAGNOSIS — M54.42 CHRONIC LEFT-SIDED LOW BACK PAIN WITH LEFT-SIDED SCIATICA: ICD-10-CM

## 2025-06-03 DIAGNOSIS — M51.26 DISPLACEMENT OF LUMBAR INTERVERTEBRAL DISC WITHOUT MYELOPATHY: ICD-10-CM

## 2025-06-03 PROCEDURE — 97112 NEUROMUSCULAR REEDUCATION: CPT | Performed by: PHYSICAL THERAPIST

## 2025-06-03 PROCEDURE — 97161 PT EVAL LOW COMPLEX 20 MIN: CPT | Performed by: PHYSICAL THERAPIST

## 2025-06-05 ENCOUNTER — CLINICAL SUPPORT (OUTPATIENT)
Dept: REHABILITATION | Facility: HOSPITAL | Age: 75
End: 2025-06-05
Payer: MEDICARE

## 2025-06-05 DIAGNOSIS — M54.42 CHRONIC LEFT-SIDED LOW BACK PAIN WITH LEFT-SIDED SCIATICA: Primary | ICD-10-CM

## 2025-06-05 DIAGNOSIS — G89.29 CHRONIC LEFT-SIDED LOW BACK PAIN WITH LEFT-SIDED SCIATICA: Primary | ICD-10-CM

## 2025-06-05 PROCEDURE — 97112 NEUROMUSCULAR REEDUCATION: CPT | Mod: CQ

## 2025-06-09 ENCOUNTER — CLINICAL SUPPORT (OUTPATIENT)
Dept: REHABILITATION | Facility: HOSPITAL | Age: 75
End: 2025-06-09
Payer: MEDICARE

## 2025-06-09 DIAGNOSIS — G89.29 CHRONIC LEFT-SIDED LOW BACK PAIN WITH LEFT-SIDED SCIATICA: Primary | ICD-10-CM

## 2025-06-09 DIAGNOSIS — M54.42 CHRONIC LEFT-SIDED LOW BACK PAIN WITH LEFT-SIDED SCIATICA: Primary | ICD-10-CM

## 2025-06-09 PROCEDURE — 97530 THERAPEUTIC ACTIVITIES: CPT | Mod: CQ

## 2025-06-09 PROCEDURE — 97112 NEUROMUSCULAR REEDUCATION: CPT | Mod: CQ

## 2025-06-11 ENCOUNTER — OFFICE VISIT (OUTPATIENT)
Dept: PAIN MEDICINE | Facility: CLINIC | Age: 75
End: 2025-06-11
Payer: MEDICARE

## 2025-06-11 ENCOUNTER — TELEPHONE (OUTPATIENT)
Dept: PAIN MEDICINE | Facility: CLINIC | Age: 75
End: 2025-06-11

## 2025-06-11 VITALS
WEIGHT: 173.94 LBS | BODY MASS INDEX: 26.36 KG/M2 | HEART RATE: 74 BPM | DIASTOLIC BLOOD PRESSURE: 66 MMHG | SYSTOLIC BLOOD PRESSURE: 98 MMHG | HEIGHT: 68 IN

## 2025-06-11 DIAGNOSIS — M48.061 SPINAL STENOSIS, LUMBAR REGION, WITHOUT NEUROGENIC CLAUDICATION: ICD-10-CM

## 2025-06-11 DIAGNOSIS — G60.9 IDIOPATHIC PERIPHERAL NEUROPATHY: Primary | ICD-10-CM

## 2025-06-11 DIAGNOSIS — G56.03 BILATERAL CARPAL TUNNEL SYNDROME: ICD-10-CM

## 2025-06-11 DIAGNOSIS — M21.372 BILATERAL FOOT-DROP: ICD-10-CM

## 2025-06-11 DIAGNOSIS — G43.709 CHRONIC MIGRAINE WITHOUT AURA WITHOUT STATUS MIGRAINOSUS, NOT INTRACTABLE: ICD-10-CM

## 2025-06-11 DIAGNOSIS — M46.1 BILATERAL SACROILIITIS: ICD-10-CM

## 2025-06-11 DIAGNOSIS — M21.371 BILATERAL FOOT-DROP: ICD-10-CM

## 2025-06-11 DIAGNOSIS — M96.1 POST LAMINECTOMY SYNDROME: ICD-10-CM

## 2025-06-11 DIAGNOSIS — G60.0 CHARCOT-MARIE-TOOTH ATROPHY: ICD-10-CM

## 2025-06-11 DIAGNOSIS — F33.9 EPISODE OF RECURRENT MAJOR DEPRESSIVE DISORDER, UNSPECIFIED DEPRESSION EPISODE SEVERITY: ICD-10-CM

## 2025-06-11 PROCEDURE — 99205 OFFICE O/P NEW HI 60 MIN: CPT | Mod: S$GLB,,, | Performed by: STUDENT IN AN ORGANIZED HEALTH CARE EDUCATION/TRAINING PROGRAM

## 2025-06-11 PROCEDURE — 99999 PR PBB SHADOW E&M-EST. PATIENT-LVL IV: CPT | Mod: PBBFAC,,, | Performed by: STUDENT IN AN ORGANIZED HEALTH CARE EDUCATION/TRAINING PROGRAM

## 2025-06-11 PROCEDURE — 3288F FALL RISK ASSESSMENT DOCD: CPT | Mod: CPTII,S$GLB,, | Performed by: STUDENT IN AN ORGANIZED HEALTH CARE EDUCATION/TRAINING PROGRAM

## 2025-06-11 PROCEDURE — 1125F AMNT PAIN NOTED PAIN PRSNT: CPT | Mod: CPTII,S$GLB,, | Performed by: STUDENT IN AN ORGANIZED HEALTH CARE EDUCATION/TRAINING PROGRAM

## 2025-06-11 PROCEDURE — 3074F SYST BP LT 130 MM HG: CPT | Mod: CPTII,S$GLB,, | Performed by: STUDENT IN AN ORGANIZED HEALTH CARE EDUCATION/TRAINING PROGRAM

## 2025-06-11 PROCEDURE — 1157F ADVNC CARE PLAN IN RCRD: CPT | Mod: CPTII,S$GLB,, | Performed by: STUDENT IN AN ORGANIZED HEALTH CARE EDUCATION/TRAINING PROGRAM

## 2025-06-11 PROCEDURE — 3078F DIAST BP <80 MM HG: CPT | Mod: CPTII,S$GLB,, | Performed by: STUDENT IN AN ORGANIZED HEALTH CARE EDUCATION/TRAINING PROGRAM

## 2025-06-11 PROCEDURE — 3008F BODY MASS INDEX DOCD: CPT | Mod: CPTII,S$GLB,, | Performed by: STUDENT IN AN ORGANIZED HEALTH CARE EDUCATION/TRAINING PROGRAM

## 2025-06-11 PROCEDURE — 1101F PT FALLS ASSESS-DOCD LE1/YR: CPT | Mod: CPTII,S$GLB,, | Performed by: STUDENT IN AN ORGANIZED HEALTH CARE EDUCATION/TRAINING PROGRAM

## 2025-06-11 PROCEDURE — 1159F MED LIST DOCD IN RCRD: CPT | Mod: CPTII,S$GLB,, | Performed by: STUDENT IN AN ORGANIZED HEALTH CARE EDUCATION/TRAINING PROGRAM

## 2025-06-11 NOTE — PROGRESS NOTES
Chronic Pain - New Consult    Referring Physician: Torri Rodriguez,*    Date: 06/11/2025     Re: Raffy Rutherford Jr.  MR#: [id number]  YOB: 1950  Age: 75 y.o.    Chief Complaint:   Chief Complaint   Patient presents with    Leg Pain    Low-back Pain    Neck Pain     **This note is dictated using the M*Modal Fluency Direct word recognition program. There are word recognition mistakes that are occasionally missed on review. This note was generated with the assistance of ambient listening technology. Verbal consent was obtained by the patient and accompanying visitor(s) for the recording of patient appointment to facilitate this note. I attest to having reviewed and edited the generated note for accuracy, though some syntax or spelling errors may persist. Please contact the author of this note for any clarification.   **    ASSESSMENT: 75 y.o. year old male with back, foot, leg, hand pain, consistent with     1. Idiopathic peripheral neuropathy        2. Post laminectomy syndrome        3. Spinal stenosis, lumbar region, without neurogenic claudication        4. Bilateral foot-drop        5. Chronic migraine without aura without status migrainosus, not intractable        6. Bilateral carpal tunnel syndrome        7. Episode of recurrent major depressive disorder, unspecified depression episode severity        8. Charcot-Selena-Tooth atrophy        9. Bilateral sacroiliitis          **Assessment & Plan    M21.371 Foot drop, right foot  M21.372 Foot drop, left foot  M47.812 Spondylosis without myelopathy or radiculopathy, cervical region  G89.29 Other chronic pain  R26.89 Other abnormalities of gait and mobility  M62.561 Muscle wasting and atrophy, not elsewhere classified, right lower leg  M62.562 Muscle wasting and atrophy, not elsewhere classified, left lower leg  M48.07 Spinal stenosis, lumbosacral region  M50.10 Cervical disc disorder with radiculopathy, unspecified cervical region  M16.11  Unilateral primary osteoarthritis, right hip  M46.1 Sacroiliitis, not elsewhere classified  Z96.642 Presence of left artificial hip joint  Z99.89 Dependence on other enabling machines and devices  Z79.891 Long term (current) use of opiate analgesic  G61.81 Chronic inflammatory demyelinating polyneuritis  G97.82 Other postprocedural complications and disorders of nervous system  K42.9 Umbilical hernia without obstruction or gangrene      PLAN:     SACROILIITIS:  - Scheduled bilateral SI joint injection for June 24th   - Sedation optional but may be beneficial due to potential discomfort.  - We will schedule the patient for bilateral SIJ.  Risks,benefits, and alternatives of the procedure were discussed with the patient and He would like to proceed with the procedure.  At this juncture, we believe that the patient's medical comorbidity status adds medium risk and complexity to our proposed evaluation and treatment.    CHRONIC INFLAMMATORY DEMYELINATING POLYNEURITIS:  - Scheduled Qutenza patch application for neuropathy treatment on July 9th at 8:30 AM.  - Apply OTC 4% lidocaine cream to the area of worst neuropathy 30-60 minutes before Qutenza patch appointment.  - Use a marker to draw a line where the worst neuropathy ends before Qutenza patch appointment.  - Bring a book or phone to occupy time during 1-hour Qutenza patch application.  - Follow up 1 month after Qutenza patch application to assess effectiveness.  - Contact the office if experiencing significant burning or pain after Qutenza patch application.  - Spinal cord stimulator mentioned as potential future treatment option for neuropathy and post-laminectomy syndrome.  - Explained trial process and potential benefits, including significant improvement in neuropathy symptoms and possible reversal.    SPINAL STENOSIS, LUMBOSACRAL REGION:  3/10/25 - DTP Javi - b/l L2-3 TFESI - no sed - no AC - helpful for a few weeks  - Spinal cord stimulator mentioned as  potential future treatment option for neuropathy and post-laminectomy syndrome.  - Explained trial process and potential benefits, including significant improvement in neuropathy symptoms and possible reversal.    UNILATERAL PRIMARY OSTEOARTHRITIS, RIGHT HIP:  - Potential future hip injection discussed as diagnostic and therapeutic procedure.  - Would be done under X-ray guidance without sedation, involving numbing medication and steroid injection into hip joint.      - RTC 1 month after qutenza  - Counseled patient regarding the importance of activity modification and physical therapy.    The above plan and management options were discussed at length with patient. Patient is in agreement with the above and verbalized understanding. It will be communicated with the referring physician via electronic record, fax, or mail.  Lab/study reports reviewed were important and necessary because subsequent medical and treatment recommendations required review of the above lab/study reports. Images viewed/reviewed above were important and necessary because subsequent medical and treatment recommendations required review of the reviewed image(s).     Electronically signed by:  Herman Palomino DO  06/11/2025    Patient was seen in clinic today.  The total amount of time spent on this patient was exactly 62 minutes.  Due to medical complexity, time spent with the patient, and multiple issues discussed/addressed I am billing for a level 5 visit. This includes face to face time and non-face to face time preparing to see the patient by reviewing previous labs/imaging, obtaining and/or reviewing separately obtained history, documenting clinical information in the electronic or other health record, independently reviewing results and communicating results to the patient/family/caregiver/additional providers.  The available imaging was reviewed in person with the patient, pathology and treatment options were explained in detail  with the patient.  The patient was given multiple opportunities to ask questions, and all questions were answered.    =========================================================================================================    SUBJECTIVE:    Raffy Rutherford Jr. is a 75 y.o. male presents to the clinic for the evaluation of lower back back, right leg pain. The pain started 6 months ago following no inciting event and symptoms have been worsening.  Patient thinks he might have history of charcot-vinny-tooth.  He had an EMG that diagnosed neuropathy.  Worse in the right leg. Recently getting numbness and tingling in the hands. He gets burning from the mid shin and goes to the top of the foot and the bottom of the toes.  Worse while wearing braces. He has foot drop on the right side. He has b/l AFOs. He has a lot of atrophy in the calf muscles. This has been ongoing for a number of years.  He does problems with buttoning shirts and dropping objects. He is currently in PT which has aggravated an umbilical hernia. Patient used to see Dr. Olivarez for pain but no longer on insurance.      Patient presents with severe neuropathic pain in his right leg and recent onset of numbness and tingling in his left leg. Pain is burning, starting from the mid-shin and extending into the foot, primarily affecting the top of the foot. He indicates the mid-shin area as the origin of the pain. He has pronounced foot drop on the right side and slight foot drop on the left, with significant atrophy in his right calf muscle.    His symptoms have been ongoing for several years, following a history of four back surgeries, including laminectomies and two fusions. His most recent fusion was approximately five years ago, around 2018. He reports recent onset of dropping things from his hands and some handwriting difficulties, but denies buttoning shirt difficulties.    Current medications include hydrocodone 2.5 mg 3 times daily (7.5 mg total per  day) and pregabalin 175 mg at night, occasionally adding 25 mg at dinner if neuropathic pain is severe. He previously saw Dr. Olivarez for pain management but changed due to insurance issues. Treatments have included back injections with Dr. Olivarez and Dr. Lehman, providing temporary relief. He recently received a bilateral transforaminal epidural injection a few months ago.    He reports groin pain on the right side, radiating to the hip area. His walking has worsened, especially on the right side, with difficulty controlling leg movement. He also has back pain that radiates into his pelvic area and sometimes to the side. Pain occurs when changing positions, particularly when getting up from certain chairs.    Additional medical history includes a successful left hip replacement 13 years ago. He has had five surgeries in the last few years, including two carpal tunnel surgeries and a cubital tunnel surgery. He is scheduled for a hiking trip in the [facility] in about five weeks.    He does not have central stenosis in the neck and does not have diabetes. He is right-handed.    PREVIOUS TREATMENTS:  Patient has undergone four back surgeries, starting with laminectomies and two fusions, beginning 13 years ago when he was 62 years old. He received an epidural injection which provided minimal benefit, lasting only a few weeks. He also underwent a spinal cord stimulator trial when the devices were relatively new. Patient is currently attending physical therapy, but it has aggravated a long-standing hernia. He is receiving Botox treatments for headaches from neurology. Acupuncture provided slight benefit.    MEDICATIONS:  Patient is on Hydrocodone 2.5 mg, 1 tablet 3 times daily (total 7.5 mg per day). He takes Pregabalin (Lyrica) 175 mg, 1 tablet at night, with occasional additional 25 mg at dinner time if neuropathic pain is severe. He is currently taking Celebrex but considering stopping. Patient takes half a  tablet of Methocarbamol (Robaxin) when his back is really tight. He uses Clonazepam (Klonopin) as needed for insomnia.    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 He recently taken up cello due to back issues making basses too heavy. He has had to stop playing gigs due to health issues. Patient also works as a licensed professional counselor or .     Pain Description:    The pain is located in the lower back area and radiates to the right leg.    At BEST  5/10   At WORST  8/10 on the WORST day.    On average pain is rated as 6/10.   Today the pain is rated as 3/10  The pain is continuous.  The pain is described as burning, numbing, and tingling    Symptoms interfere with daily activity.   Exacerbating factors: Sitting and Standing.    Mitigating factors heat, ice, and topicals.   He reports 7 hours of sleep per night.    Physical Therapy/Home Exercise: Yes, currently in Physical therapy    Current Pain Medications:    - Pregabalin 175mg QHS (drowsy), Hydrocodone 2.5mg TID, urogepant, lamictal 200mg, Selegiline, Celebrex 200mg, klonipin 0.5mg (for insomnia), methocarbamol rarely    Failed Pain Medications:    - all of the above    Pain Treatment Therapies:    Pain procedures:   Back injections with Dr. Olivarez  3/10/25 - ALLISON Caldwell - b/l L2-3 TFESI - no sed - no AC - helpful for a few weeks  4/29/25 - botox for migraines  Physical Therapy: yes  Chiropractor: none  Acupuncture: none  TENS unit: none  Spinal decompression:   Laminectomy x2  - 2012  Fusion x2 - most recent 2018 L3-S1  Hand surgery x5 - tendon / CTS  Joint replacement: left SHARAD 2012    Patient reports significant motor weakness and loss of sensations.  Patient denies any suicidal or homicidal ideations     report:  Reviewed and consistent with medication use as prescribed.    Imaging:   CERVICAL MRI  Results for orders placed during the hospital encounter of 05/25/25    MRI Cervical Spine Without  Contrast    Narrative  EXAMINATION:  MRI CERVICAL SPINE WITHOUT CONTRAST    CLINICAL HISTORY:  Neck pain, chronic, degenerative changes on xray; Anesthesia of skin    TECHNIQUE:  Multiplanar, multisequence MR images of the cervical spine were performed utilizing no contrast.    COMPARISON:  Cervical spine radiographs 05/15/2025, MRI cervical spine 01/04/2021    FINDINGS:  C1-C2: Dens is intact.  Pre dens space is maintained.    Alignment: Minimal grade 1 retrolisthesis of C4 on C5.    Vertebrae: C4, C7, and T2 benign osseous hemangiomas.  Additional hemangioma noted in the right C5 facet.    Discs: Multilevel mild disc desiccation with moderate height loss at C3-C4.  No sub endplate marrow edema/Modic 1 changes.    Cord: Normal.    Skull base and craniocervical junction: Normal.    Degenerative findings:    C2-C3: No spinal canal stenosis or neural foraminal narrowing.    C3-C4: Small posterior disc osteophyte complex and uncovertebral spurring.  Bilateral facet arthropathy.  Mild bilateral neural foraminal narrowing.  No spinal canal stenosis.    C4-C5: Small posterior disc osteophyte complex with uncovertebral spurring.  Bilateral facet arthropathy.  Mild right and moderate left neural foraminal narrowing.  Mild spinal canal stenosis.    C5-C6: Small posterior disc osteophyte complex with uncovertebral spurring.  Bilateral facet arthropathy.  Moderate right and mild left neural foraminal narrowing.  Mild spinal canal stenosis.    C6-C7: Small posterior disc osteophyte complex.  Bilateral small perineural cysts.  Mild bilateral neural foraminal narrowing.  No spinal canal stenosis.    C7-T1: No spinal canal stenosis or neural foraminal narrowing.  Bilateral small perineural cysts.    Paraspinal muscles & soft tissues: Heterogeneous enlarged left thyroid gland, correlating with prior exams.  Left submandibular gland is not visualized.    Impression  Cervical spondylosis, contributing to mild spinal canal stenosis at  C4-5 and C5-6 and mild/ moderate neural foraminal narrowing C3-4 through C6-7, as above.    Electronically signed by resident: Antony Vilchis  Date:    05/25/2025  Time:    15:38    Electronically signed by: Giuseppe Cleary MD  Date:    05/25/2025  Time:    16:12     LUMBAR MRI  Results for orders placed during the hospital encounter of 02/12/25    MRI Lumbar Spine Without Contrast    Narrative  EXAMINATION:  MRI LUMBAR SPINE WITHOUT CONTRAST    CLINICAL HISTORY:  Lumbar radiculopathy, symptoms persist with conservative treatment; Radiculopathy, lumbar region    TECHNIQUE:  Multiplanar, multisequence MR images were acquired from the thoracolumbar junction to the sacrum without contrast.    COMPARISON:  Radiographs 08/16/2023    FINDINGS:  Postoperative changes of instrumented fusion at L3-S1.  Solid fusion noted across the L3-L4 and L4-L5 levels.  L5-S1 fusion not certain.  No abnormal signal about the hardware to suggest loosening.    Alignment: Grade 1 retrolisthesis at L2-L3.    Vertebrae: Chronic T12 vertebral body compression fracture with vertebroplasty cement and mild height loss.  Mild anterior height loss of the L1 vertebral body.  No evidence for acute fracture.  No marrow infiltrative process.  Modic 1 degenerative endplate changes noted at L2-L3.    Discs: Severe disc height loss at L2-L3.  No evidence for discitis.    Cord: Conus terminates at L1 and appears unremarkable.  Cauda equina appears unremarkable.    Degenerative findings:    T12-L1: No spinal canal stenosis or neuroforaminal narrowing.    L1-L2: No spinal canal stenosis or neuroforaminal narrowing.    L2-L3: Circumferential disc bulge and moderate facet arthropathy result in moderate spinal canal stenosis and mild bilateral neural foraminal narrowing.    L3-L4: Operative level.  No spinal canal stenosis or neural foraminal narrowing.    L4-L5: Operative level.  No spinal canal stenosis or neural foraminal narrowing.    L5-S1: Operative level.   Mild bilateral neural foraminal narrowing, obscured by artifact.    Paraspinal muscles & soft tissues: Moderate paraspinal muscle atrophy.    Impression  1. Postoperative changes of instrumented fusion at L3-S1, detailed above.  2. Degenerative changes primarily at L2-L3 resulting in moderate spinal canal stenosis and mild bilateral neural foraminal narrowing.      Electronically signed by: Ji Monterroso MD  Date:    02/12/2025  Time:    13:27            6/11/2025     8:11 AM 2/8/2017     8:54 AM 11/17/2015    11:23 AM 10/22/2015     2:11 PM 9/15/2015     2:19 PM 3/6/2014     9:46 AM 3/3/2014     9:21 AM   Pain Disability Index (PDI)   Family/Home Responsibilities: 6 5 7 3 6 6 3   Recreation: 6 8 6 7 7 5 10   Occupation: 6 6 8 6 7 8 6   Sexual Behavior: 6 2 5 0 7 0 4   Self Care: 6 2 3 0 5 8 1   Life-Support Activities: 6 0 0 0 0 8 0   Pain Disability Index (PDI) 42 25 35 22 37 41 30        Past Medical History:   Diagnosis Date    Allergy     Arthritis     Back pain     after trauma beginning in 195    Cataract     Chronic pain     neck and left shoulder    Cluster headache 2013    Colon polyps 2007 2007-2019: TA x5, HP x3    Degenerative disc disease     Depression     Diverticulitis 12/2013    Dry eye syndrome     Fibromyalgia 2013    GERD (gastroesophageal reflux disease)     Hepatitis 1970's    A    History of prostate biopsy 2002    Hyperlipidemia     Joint pain     Prostate cancer 07/2021    PVD (posterior vitreous detachment)     Salzmann's nodular dystrophy of left eye     Sleep apnea     Thyroid nodule 07/16/2014    Trigeminal neuralgia of left side of face     Tubular adenoma of colon 2007    5 removed [id number]    Visual disturbance 2012    problems after cataract surgery     Past Surgical History:   Procedure Laterality Date    ARTHROSCOPIC DEBRIDEMENT OF SHOULDER Left 04/19/2024    Procedure: DEBRIDEMENT, SHOULDER, ARTHROSCOPIC;  Surgeon: GEORGIANA Up MD;  Location: [facility] OR;  Service:  Orthopedics;  Laterality: Left;  Regional w/o Catheter, Interscalene, 0.5% Marcaine Plain    ARTHROSCOPIC REPAIR OF ROTATOR CUFF OF SHOULDER Left 04/19/2024    Procedure: REPAIR, ROTATOR CUFF, ARTHROSCOPIC;  Surgeon: GEORGIANA Up MD;  Location: [facility] OR;  Service: Orthopedics;  Laterality: Left;    ARTHROSCOPY OF SHOULDER WITH DECOMPRESSION OF SUBACROMIAL SPACE Left 04/19/2024    Procedure: ARTHROSCOPY, SHOULDER, WITH SUBACROMIAL SPACE DECOMPRESSION;  Surgeon: GEORGIANA Up MD;  Location: [facility] OR;  Service: Orthopedics;  Laterality: Left;    ARTHROSCOPY,SHOULDER,WITH BICEPS TENODESIS Left 04/19/2024    Procedure: ARTHROSCOPY,SHOULDER,WITH BICEPS TENODESIS;  Surgeon: GEORGIANA Up MD;  Location: [facility] OR;  Service: Orthopedics;  Laterality: Left;    BACK SURGERY      CARPAL TUNNEL RELEASE Right 05/18/2021    Procedure: RELEASE, CARPAL TUNNEL;  Surgeon: Kaye Solis MD;  Location: [facility] OR;  Service: Orthopedics;  Laterality: Right;    CATARACT EXTRACTION W/  INTRAOCULAR LENS IMPLANT Bilateral     CHOLECYSTECTOMY      COLONOSCOPY N/A 12/17/2019    Normal - Repeat 5yrs    COLONOSCOPY  2007 2007 TA x2, 2011 TA x3, 2014 HP x3, 2019 normal    COLONOSCOPY N/A 2/24/2025    Procedure: COLONOSCOPY;  Surgeon: Durga Harvey MD;  [facility]  Service: Endoscopy;  Laterality: N/A;  11/26 ref by GARO Quick Suflave, instructions handed to pt in ofc and portal. [email or web address]  Jm/pt wife Valarie Rutherford rescheduled to an earlier time/ prep inst given in office / Changed prep to miralax /referral JACK barlow  Pt rescheduled to due to new ins/pt moved procedure to      COSMETIC SURGERY  02/10/2015    Direct mid-forehead brow lift    COSMETIC SURGERY  02/10/2015    Bilateral upper lid blepahroplasty    CYSTOSCOPY WITH URETEROSCOPY, RETROGRADE PYELOGRAPHY, AND INSERTION OF STENT Left 08/19/2020    Procedure: CYSTOSCOPY, WITH RETROGRADE PYELOGRAM AND URETERAL STENT INSERTION;  Surgeon: Katelynn George MD;   Location: [facility];  Service: Urology;  Laterality: Left;    EPIDURAL STEROID INJECTION INTO LUMBAR SPINE Bilateral 3/10/2025    Procedure: Bilateral L2-3 TFESI;  Surgeon: Herman Palomino DO;  Location: [facility];  Service: Pain Management;  Laterality: Bilateral;  no sed-no ac    ESOPHAGOGASTRODUODENOSCOPY N/A 12/17/2019    Procedure: ESOPHAGOGASTRODUODENOSCOPY (EGD);  Surgeon: Amadou Hardin MD;  Location: [facility] ENDO (4TH FLR);  Service: Endoscopy;  Laterality: N/A;    ESOPHAGOGASTRODUODENOSCOPY N/A 2/24/2025    Procedure: EGD (ESOPHAGOGASTRODUODENOSCOPY);  Surgeon: Durga Harvey MD;  Location: [facility] ENDO (4TH FLR);  Service: Endoscopy;  Laterality: N/A;    EYE SURGERY      FUSION OF LUMBAR SPINE BY ANTERIOR APPROACH N/A 08/20/2020    Procedure: FUSION, SPINE, LUMBAR, ANTERIOR APPROACH L5-S1 ALIF Stand Alone;  Surgeon: Jason Caldwell MD;  Location: [facility];  Service: Neurosurgery;  Laterality: N/A;    HEMORRHOID SURGERY      with complication of chronic bleeding for 6 weeks     INJECTION OF STEROID Right 12/06/2018    Procedure: INJECTION, STEROID Right SI Joint Block and Steroid Injection;  Surgeon: Jason Caldwell MD;  Location: [facility];  Service: Neurosurgery;  Laterality: Right;  Procedure: Right SI Joint Block and Steroid Injection  Surgery Time: 30 MIN  LOS: 0  Anesthesia: MAC  Radiology: C-arm  Bed: [place] 4 Poster  Position: Prone    INJECTION OF STEROID Right 09/19/2019    Procedure: INJECTION, STEROID Procedure: Right SI joint block nd steroid injection;  Surgeon: Jason Caldwell MD;  Location: [facility];  Service: Neurosurgery;  Laterality: Right;  Procedure: Right SI joint block nd steroid injection  Surgery Time: 30 mins  LOS:   Anesthesia: General MAC  Radiology:C-arm  Bed: Regular Bed  Position: Prone    IRRIGATION AND DEBRIDEMENT OF UPPER EXTREMITY Left 04/07/2022    Procedure: IRRIGATION AND DEBRIDEMENT, UPPER EXTREMITY;  Surgeon: Kaye Solis MD;  Location: [facility];  Service:  Orthopedics;  Laterality: Left;    JOINT REPLACEMENT      KNEE SURGERY      involving arthroscopic surgery to both knees    REPAIR OF EXTENSOR TENDON Left 04/07/2022    Procedure: REPAIR, TENDON, EXTENSOR thumb, EPB and EPL;  Surgeon: Kaye Solis MD;  Location: [facility] OR;  Service: Orthopedics;  Laterality: Left;    SHOULDER SURGERY      SINUS SURGERY      left molar and sinus surgery for trigeminal neuralgia    SPINAL FUSION  06/22/2015    L3-L5 XLIF/TANA    TOTAL HIP ARTHROPLASTY  04/2012    Pt states he had total hip replacement on his left hip.    ULNAR NERVE TRANSPOSITION Left 12/16/2020    Procedure: TRANSPOSITION, NERVE, ULNAR - left carpal and cubital tunnel releases;  Surgeon: Addi Bauer MD;  Location: [facility] OR;  Service: Orthopedics;  Laterality: Left;     Social History[1]  Family History   Problem Relation Name Age of Onset    Stroke Mother Honey 86    Colon cancer Mother Honey         early/mid-60s    Uterine cancer Mother Honey 77    Pancreatitis Brother Will         acute, now s/p nathalia    Diabetes Brother Will     Macular degeneration Brother Will     Other Brother Will         foot drop    Other Father Raffy,Sr. 44        pituitary tumor- CA vs benign?    Heart attack Maternal Grandfather mgf         suspected, 50s    Migraines Sister Ida     Crohn's disease Sister Ida         w/ assoc joint issues    Arthritis Sister Ida     Tremor Daughter Rocio     Irritable bowel syndrome Son Rocky         leaning toward Crohn's dx    Neurological Disorders Son Rocky         nonspecific, benign fasciculations of calves    Tremor Son Rocky     Jaundice Grandchild Jocelyne     Other Maternal Uncle Jesse         memory issue    Other Maternal Uncle Real         memory issue    Cancer Maternal Cousin Ira         origin? maybe thyroid? 40s?    Melanoma Neg Hx      Amblyopia Neg Hx      Blindness Neg Hx      Cataracts Neg Hx      Glaucoma Neg Hx      Hypertension Neg Hx      Retinal detachment Neg  Hx      Strabismus Neg Hx      Thyroid disease Neg Hx      Allergic rhinitis Neg Hx      Allergies Neg Hx      Angioedema Neg Hx      Asthma Neg Hx      Eczema Neg Hx      Urticaria Neg Hx      Rhinitis Neg Hx      Immunodeficiency Neg Hx      Atopy Neg Hx         Review of patient's allergies indicates:   Allergen Reactions    Alphagan [brimonidine]      Patient taking MASO-B Selective Inhibitor Selegiline (Emsam)    Coumadin [warfarin]      itch    Oxycodone      hiccups       Current Outpatient Medications   Medication Sig    atorvastatin (LIPITOR) 40 MG tablet Take 1 tablet (40 mg total) by mouth once daily.    butalbital-acetaminophen-caffeine -40 mg (FIORICET, ESGIC) -40 mg per tablet Take 1 tablet by mouth daily as needed for 30 days.    celecoxib (CELEBREX) 100 MG capsule Take 2 capsules (200 mg total) by mouth 2 (two) times daily.    clindamycin (CLEOCIN) 150 MG capsule Take 4 capsules 1 hour prior to dental appointment    clonazePAM (KLONOPIN) 0.5 MG tablet Take 1 tablet by mouth twice a day as needed for anxiety    cycloSPORINE (VEVYE) 0.1 % Drop Place 1 drop into both eyes 2 (two) times a day.    ergocalciferol (VITAMIN D2) 50,000 unit Cap Take 1 capsule (50,000 Units total) by mouth every 7 days.    fremanezumab-vfrm (AJOVY SYRINGE) 225 mg/1.5 mL injection Inject 1.5 mLs (225 mg total) into the skin every 28 days.    HYDROcodone-acetaminophen (NORCO) 5-325 mg per tablet Take 1 tablet by mouth every 8 (eight) hours as needed for Pain.    HYDROcodone-acetaminophen (NORCO) 7.5-325 mg per tablet Take 1 tablet by mouth 4 (four) times daily as needed.    hydrocortisone-pramoxine (ANALPRAM-HC) 2.5-1 % Crea Place rectally 3 (three) times daily.    ketoconazole (NIZORAL) 2 % cream Apply to affected areas of body folds twice daily as needed for irritation.    lamoTRIgine (LAMICTAL) 200 MG tablet Take 1 tablet (200 mg total) by mouth once daily.    methocarbamoL (ROBAXIN) 750 MG Tab Take 1 tablet (750  mg total) by mouth 3 (three) times daily.    perfluorohexyloctane, PF, (MIEBO, PF,) 100 % Drop Place 1 drop into both eyes 4 (four) times daily.    pregabalin (LYRICA) 50 MG capsule Take 1 capsule (50 mg total) by mouth once daily.    pregabalin (LYRICA) 75 MG capsule Take 3 capsules (225 mg total) by mouth daily at night    RABEprazole (ACIPHEX) 20 mg tablet Take 1 tablet (20 mg total) by mouth 2 (two) times daily.    selegiline (EMSAM) 12 mg/24 hr Place 1 patch onto the skin once daily.    selegiline (EMSAM) 9 mg/24 hr Place 1 patch onto the skin once daily.    traZODone (DESYREL) 100 MG tablet Take 1 tablet (100 mg total) by mouth every evening.    triamcinolone acetonide 0.025% (KENALOG) 0.025 % cream Apply to affected areas of body folds twice daily as needed for irritation. Do not use for longer than 2 weeks in a row.    ubrogepant (UBRELVY) 100 mg tablet Take 1 tablet (100 mg total) by mouth every 2 (two) hours as needed for Migraine. Max 200 mg/day.    ubrogepant (UBRELVY) 100 mg tablet Take 1 tablet (100 mg total) by mouth every 2 (two) hours as needed for Migraine. Max 200 mg/day.     No current facility-administered medications for this visit.       REVIEW OF SYSTEMS:    GENERAL:  No weight loss, malaise or fevers.:NO  HEENT:   No recent changes in vision or hearing:NO  NECK:  Negative for lumps, no difficulty with swallowing.:NO  RESPIRATORY:  Negative for cough, wheezing or shortness of breath, patient denies any recent URI.:NO  CARDIOVASCULAR:  Negative for chest pain, leg swelling or palpitations.:NO  GI:  Negative for abdominal discomfort, blood in stools or black stools or change in bowel habits.:+abdominal discomfort  MUSCULOSKELETAL:  See HPI.  SKIN:  Negative for lesions, rash, and itching.:NO  PSYCH:  No mood disorder or recent psychosocial stressors.  Patients sleep is not disturbed secondary to pain.:+depression  HEMATOLOGY/LYMPHOLOGY:  Negative for prolonged bleeding, bruising easily or  "swollen nodes.  Patient is not currently taking any anti-coagulants:NO  NEURO:   No history of headaches, syncope, paralysis, seizures or tremors.+migraines  All other reviewed and negative other than HPI.    OBJECTIVE:    BP 98/66 (BP Location: Left arm, Patient Position: Sitting)   Pulse 74   Ht 5' 8" (1.727 m)   Wt 78.9 kg (173 lb 15.1 oz)   BMI 26.45 kg/m²     PHYSICAL EXAMINATION:    GENERAL: Well appearing, in no acute distress, alert and oriented x3.  PSYCH:  Mood and affect appropriate.  SKIN: Skin color, texture, turgor normal, no rashes or lesions.  HEAD/FACE:  Normocephalic, atraumatic. Cranial nerves grossly intact.    NECK:   - Did not perform pain to palpation over the cervical paraspinous muscles.   - Spurling  Did not perform.  - Did not perform pain with neck flexion, extension, or lateral flexion.     CV: RRR with palpation of the radial artery.  PULM: CTAB. No evidence of respiratory difficulty, symmetric chest rise.  GI:  Soft and non-tender.    BACK:   - No obvious deformity or signs of trauma, Normal lumbar lordotic curve  - Negative spinous process tenderness  - Positive paravertebral tenderness  - Positive pain to palpation over the facet joints of the lumbar spine.   - Positive QL / Iliac crest / Glut tenderness  - Slump test is Negative for radicular pain  - Slump test is Negative for back pain  - Supine Straight leg raising is Negative for radicular pain  - Supine Straight leg raising is Negative for back pain  - Did not perform Sustained Hip Flexion test (for discogenic pain)  - Positive Altered Gait, Posture  - Axial facet loading test Positive on the bilateral side(s)    SI Joint exam:  - Positive SI joint tenderness to palpation  - Brent's sign Positive  - Yeoman's Test: Did not perform for SI joint pain indicating anterior SI ligament involvement. Did not perform for anterior thigh pain/paresthesia which indicates femoral nerve stretch.  - Gaenslen's Test:Positive  - Finger " Blake's Sign:Positive  - SI compression test:Positive  - SI distraction test:Negative  - Thigh Thrust: Positive  - SI Thrust: Did not perform    MUSKULOSKELETAL:    EXTREMITIES:   Hip Exam:  - Log Roll Negative  - FADIR Negative  - Stinchfield Negative  - Hip Scour Positive  - GTB Tenderness Negative    MUSCULOSKELETAL:  No atrophy or tone abnormalities are noted in the UE or LE.  No deformities, edema, or skin discoloration are noted on visible skin. Good capillary refill.    NEURO: Bilateral upper and lower extremity coordination and muscle stretch reflexes are physiologic and symmetric.      NEUROLOGICAL EXAM:  MENTAL STATUS: A x O x 3, good concentration, speech is fluent and goal directed  MEMORY: recent and remote are intact  CN: CN2-12 grossly intact  MOTOR: 5/5 in all muscle groups  DTRs: 1+ intact symmetric  Sensation:    -yes Loss of sensation in a left lower and right lower feet and leg distribution.    GAIT: normal.         [1]   Social History  Socioeconomic History    Marital status:    Occupational History    Occupation: Psychotherpist    Tobacco Use    Smoking status: Never    Smokeless tobacco: Never   Substance and Sexual Activity    Alcohol use: Yes     Alcohol/week: 5.0 standard drinks of alcohol     Types: 5 Glasses of wine per week     Comment: close to daily    Drug use: No    Sexual activity: Yes     Partners: Female     Social Drivers of Health     Financial Resource Strain: Low Risk  (6/11/2025)    Overall Financial Resource Strain (CARDIA)     Difficulty of Paying Living Expenses: Not hard at all   Food Insecurity: No Food Insecurity (6/11/2025)    Hunger Vital Sign     Worried About Running Out of Food in the Last Year: Never true     Ran Out of Food in the Last Year: Never true   Transportation Needs: No Transportation Needs (6/11/2025)    PRAPARE - Transportation     Lack of Transportation (Medical): No     Lack of Transportation (Non-Medical): No   Physical Activity:  Inactive (6/11/2025)    Exercise Vital Sign     Days of Exercise per Week: 0 days     Minutes of Exercise per Session: 0 min   Stress: No Stress Concern Present (7/25/2024)    [employer] - Occupational Stress Questionnaire     Feeling of Stress : Not at all   Housing Stability: Low Risk  (6/11/2025)    Housing Stability Vital Sign     Unable to Pay for Housing in the Last Year: No     Homeless in the Last Year: No

## 2025-06-11 NOTE — H&P (VIEW-ONLY)
Chronic Pain - New Consult    Referring Physician: Torri Rodriguez,*    Date: 06/11/2025     Re: Raffy Rutherford Jr.  MR#: [id number]  YOB: 1950  Age: 75 y.o.    Chief Complaint:   Chief Complaint   Patient presents with    Leg Pain    Low-back Pain    Neck Pain     **This note is dictated using the M*Modal Fluency Direct word recognition program. There are word recognition mistakes that are occasionally missed on review. This note was generated with the assistance of ambient listening technology. Verbal consent was obtained by the patient and accompanying visitor(s) for the recording of patient appointment to facilitate this note. I attest to having reviewed and edited the generated note for accuracy, though some syntax or spelling errors may persist. Please contact the author of this note for any clarification.   **    ASSESSMENT: 75 y.o. year old male with back, foot, leg, hand pain, consistent with     1. Idiopathic peripheral neuropathy        2. Post laminectomy syndrome        3. Spinal stenosis, lumbar region, without neurogenic claudication        4. Bilateral foot-drop        5. Chronic migraine without aura without status migrainosus, not intractable        6. Bilateral carpal tunnel syndrome        7. Episode of recurrent major depressive disorder, unspecified depression episode severity        8. Charcot-Selena-Tooth atrophy        9. Bilateral sacroiliitis          **Assessment & Plan    M21.371 Foot drop, right foot  M21.372 Foot drop, left foot  M47.812 Spondylosis without myelopathy or radiculopathy, cervical region  G89.29 Other chronic pain  R26.89 Other abnormalities of gait and mobility  M62.561 Muscle wasting and atrophy, not elsewhere classified, right lower leg  M62.562 Muscle wasting and atrophy, not elsewhere classified, left lower leg  M48.07 Spinal stenosis, lumbosacral region  M50.10 Cervical disc disorder with radiculopathy, unspecified cervical region  M16.11  Unilateral primary osteoarthritis, right hip  M46.1 Sacroiliitis, not elsewhere classified  Z96.642 Presence of left artificial hip joint  Z99.89 Dependence on other enabling machines and devices  Z79.891 Long term (current) use of opiate analgesic  G61.81 Chronic inflammatory demyelinating polyneuritis  G97.82 Other postprocedural complications and disorders of nervous system  K42.9 Umbilical hernia without obstruction or gangrene      PLAN:     SACROILIITIS:  - Scheduled bilateral SI joint injection for June 24th   - Sedation optional but may be beneficial due to potential discomfort.  - We will schedule the patient for bilateral SIJ.  Risks,benefits, and alternatives of the procedure were discussed with the patient and He would like to proceed with the procedure.  At this juncture, we believe that the patient's medical comorbidity status adds medium risk and complexity to our proposed evaluation and treatment.    CHRONIC INFLAMMATORY DEMYELINATING POLYNEURITIS:  - Scheduled Qutenza patch application for neuropathy treatment on July 9th at 8:30 AM.  - Apply OTC 4% lidocaine cream to the area of worst neuropathy 30-60 minutes before Qutenza patch appointment.  - Use a marker to draw a line where the worst neuropathy ends before Qutenza patch appointment.  - Bring a book or phone to occupy time during 1-hour Qutenza patch application.  - Follow up 1 month after Qutenza patch application to assess effectiveness.  - Contact the office if experiencing significant burning or pain after Qutenza patch application.  - Spinal cord stimulator mentioned as potential future treatment option for neuropathy and post-laminectomy syndrome.  - Explained trial process and potential benefits, including significant improvement in neuropathy symptoms and possible reversal.    SPINAL STENOSIS, LUMBOSACRAL REGION:  3/10/25 - DTP Javi - b/l L2-3 TFESI - no sed - no AC - helpful for a few weeks  - Spinal cord stimulator mentioned as  potential future treatment option for neuropathy and post-laminectomy syndrome.  - Explained trial process and potential benefits, including significant improvement in neuropathy symptoms and possible reversal.    UNILATERAL PRIMARY OSTEOARTHRITIS, RIGHT HIP:  - Potential future hip injection discussed as diagnostic and therapeutic procedure.  - Would be done under X-ray guidance without sedation, involving numbing medication and steroid injection into hip joint.      - RTC 1 month after qutenza  - Counseled patient regarding the importance of activity modification and physical therapy.    The above plan and management options were discussed at length with patient. Patient is in agreement with the above and verbalized understanding. It will be communicated with the referring physician via electronic record, fax, or mail.  Lab/study reports reviewed were important and necessary because subsequent medical and treatment recommendations required review of the above lab/study reports. Images viewed/reviewed above were important and necessary because subsequent medical and treatment recommendations required review of the reviewed image(s).     Electronically signed by:  Herman Palomino DO  06/11/2025    Patient was seen in clinic today.  The total amount of time spent on this patient was exactly 62 minutes.  Due to medical complexity, time spent with the patient, and multiple issues discussed/addressed I am billing for a level 5 visit. This includes face to face time and non-face to face time preparing to see the patient by reviewing previous labs/imaging, obtaining and/or reviewing separately obtained history, documenting clinical information in the electronic or other health record, independently reviewing results and communicating results to the patient/family/caregiver/additional providers.  The available imaging was reviewed in person with the patient, pathology and treatment options were explained in detail  with the patient.  The patient was given multiple opportunities to ask questions, and all questions were answered.    =========================================================================================================    SUBJECTIVE:    Raffy Rutherford Jr. is a 75 y.o. male presents to the clinic for the evaluation of lower back back, right leg pain. The pain started 6 months ago following no inciting event and symptoms have been worsening.  Patient thinks he might have history of charcot-vinny-tooth.  He had an EMG that diagnosed neuropathy.  Worse in the right leg. Recently getting numbness and tingling in the hands. He gets burning from the mid shin and goes to the top of the foot and the bottom of the toes.  Worse while wearing braces. He has foot drop on the right side. He has b/l AFOs. He has a lot of atrophy in the calf muscles. This has been ongoing for a number of years.  He does problems with buttoning shirts and dropping objects. He is currently in PT which has aggravated an umbilical hernia. Patient used to see Dr. Olivarez for pain but no longer on insurance.      Patient presents with severe neuropathic pain in his right leg and recent onset of numbness and tingling in his left leg. Pain is burning, starting from the mid-shin and extending into the foot, primarily affecting the top of the foot. He indicates the mid-shin area as the origin of the pain. He has pronounced foot drop on the right side and slight foot drop on the left, with significant atrophy in his right calf muscle.    His symptoms have been ongoing for several years, following a history of four back surgeries, including laminectomies and two fusions. His most recent fusion was approximately five years ago, around 2018. He reports recent onset of dropping things from his hands and some handwriting difficulties, but denies buttoning shirt difficulties.    Current medications include hydrocodone 2.5 mg 3 times daily (7.5 mg total per  day) and pregabalin 175 mg at night, occasionally adding 25 mg at dinner if neuropathic pain is severe. He previously saw Dr. Olivarez for pain management but changed due to insurance issues. Treatments have included back injections with Dr. Olivarez and Dr. Lehman, providing temporary relief. He recently received a bilateral transforaminal epidural injection a few months ago.    He reports groin pain on the right side, radiating to the hip area. His walking has worsened, especially on the right side, with difficulty controlling leg movement. He also has back pain that radiates into his pelvic area and sometimes to the side. Pain occurs when changing positions, particularly when getting up from certain chairs.    Additional medical history includes a successful left hip replacement 13 years ago. He has had five surgeries in the last few years, including two carpal tunnel surgeries and a cubital tunnel surgery. He is scheduled for a hiking trip in the [facility] in about five weeks.    He does not have central stenosis in the neck and does not have diabetes. He is right-handed.    PREVIOUS TREATMENTS:  Patient has undergone four back surgeries, starting with laminectomies and two fusions, beginning 13 years ago when he was 62 years old. He received an epidural injection which provided minimal benefit, lasting only a few weeks. He also underwent a spinal cord stimulator trial when the devices were relatively new. Patient is currently attending physical therapy, but it has aggravated a long-standing hernia. He is receiving Botox treatments for headaches from neurology. Acupuncture provided slight benefit.    MEDICATIONS:  Patient is on Hydrocodone 2.5 mg, 1 tablet 3 times daily (total 7.5 mg per day). He takes Pregabalin (Lyrica) 175 mg, 1 tablet at night, with occasional additional 25 mg at dinner time if neuropathic pain is severe. He is currently taking Celebrex but considering stopping. Patient takes half a  tablet of Methocarbamol (Robaxin) when his back is really tight. He uses Clonazepam (Klonopin) as needed for insomnia.    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 He recently taken up cello due to back issues making basses too heavy. He has had to stop playing gigs due to health issues. Patient also works as a licensed professional counselor or .     Pain Description:    The pain is located in the lower back area and radiates to the right leg.    At BEST  5/10   At WORST  8/10 on the WORST day.    On average pain is rated as 6/10.   Today the pain is rated as 3/10  The pain is continuous.  The pain is described as burning, numbing, and tingling    Symptoms interfere with daily activity.   Exacerbating factors: Sitting and Standing.    Mitigating factors heat, ice, and topicals.   He reports 7 hours of sleep per night.    Physical Therapy/Home Exercise: Yes, currently in Physical therapy    Current Pain Medications:    - Pregabalin 175mg QHS (drowsy), Hydrocodone 2.5mg TID, urogepant, lamictal 200mg, Selegiline, Celebrex 200mg, klonipin 0.5mg (for insomnia), methocarbamol rarely    Failed Pain Medications:    - all of the above    Pain Treatment Therapies:    Pain procedures:   Back injections with Dr. Olivarez  3/10/25 - ALLISON Caldwell - b/l L2-3 TFESI - no sed - no AC - helpful for a few weeks  4/29/25 - botox for migraines  Physical Therapy: yes  Chiropractor: none  Acupuncture: none  TENS unit: none  Spinal decompression:   Laminectomy x2  - 2012  Fusion x2 - most recent 2018 L3-S1  Hand surgery x5 - tendon / CTS  Joint replacement: left SHARAD 2012    Patient reports significant motor weakness and loss of sensations.  Patient denies any suicidal or homicidal ideations     report:  Reviewed and consistent with medication use as prescribed.    Imaging:   CERVICAL MRI  Results for orders placed during the hospital encounter of 05/25/25    MRI Cervical Spine Without  Contrast    Narrative  EXAMINATION:  MRI CERVICAL SPINE WITHOUT CONTRAST    CLINICAL HISTORY:  Neck pain, chronic, degenerative changes on xray; Anesthesia of skin    TECHNIQUE:  Multiplanar, multisequence MR images of the cervical spine were performed utilizing no contrast.    COMPARISON:  Cervical spine radiographs 05/15/2025, MRI cervical spine 01/04/2021    FINDINGS:  C1-C2: Dens is intact.  Pre dens space is maintained.    Alignment: Minimal grade 1 retrolisthesis of C4 on C5.    Vertebrae: C4, C7, and T2 benign osseous hemangiomas.  Additional hemangioma noted in the right C5 facet.    Discs: Multilevel mild disc desiccation with moderate height loss at C3-C4.  No sub endplate marrow edema/Modic 1 changes.    Cord: Normal.    Skull base and craniocervical junction: Normal.    Degenerative findings:    C2-C3: No spinal canal stenosis or neural foraminal narrowing.    C3-C4: Small posterior disc osteophyte complex and uncovertebral spurring.  Bilateral facet arthropathy.  Mild bilateral neural foraminal narrowing.  No spinal canal stenosis.    C4-C5: Small posterior disc osteophyte complex with uncovertebral spurring.  Bilateral facet arthropathy.  Mild right and moderate left neural foraminal narrowing.  Mild spinal canal stenosis.    C5-C6: Small posterior disc osteophyte complex with uncovertebral spurring.  Bilateral facet arthropathy.  Moderate right and mild left neural foraminal narrowing.  Mild spinal canal stenosis.    C6-C7: Small posterior disc osteophyte complex.  Bilateral small perineural cysts.  Mild bilateral neural foraminal narrowing.  No spinal canal stenosis.    C7-T1: No spinal canal stenosis or neural foraminal narrowing.  Bilateral small perineural cysts.    Paraspinal muscles & soft tissues: Heterogeneous enlarged left thyroid gland, correlating with prior exams.  Left submandibular gland is not visualized.    Impression  Cervical spondylosis, contributing to mild spinal canal stenosis at  C4-5 and C5-6 and mild/ moderate neural foraminal narrowing C3-4 through C6-7, as above.    Electronically signed by resident: Antony Vilchis  Date:    05/25/2025  Time:    15:38    Electronically signed by: Giuseppe Cleary MD  Date:    05/25/2025  Time:    16:12     LUMBAR MRI  Results for orders placed during the hospital encounter of 02/12/25    MRI Lumbar Spine Without Contrast    Narrative  EXAMINATION:  MRI LUMBAR SPINE WITHOUT CONTRAST    CLINICAL HISTORY:  Lumbar radiculopathy, symptoms persist with conservative treatment; Radiculopathy, lumbar region    TECHNIQUE:  Multiplanar, multisequence MR images were acquired from the thoracolumbar junction to the sacrum without contrast.    COMPARISON:  Radiographs 08/16/2023    FINDINGS:  Postoperative changes of instrumented fusion at L3-S1.  Solid fusion noted across the L3-L4 and L4-L5 levels.  L5-S1 fusion not certain.  No abnormal signal about the hardware to suggest loosening.    Alignment: Grade 1 retrolisthesis at L2-L3.    Vertebrae: Chronic T12 vertebral body compression fracture with vertebroplasty cement and mild height loss.  Mild anterior height loss of the L1 vertebral body.  No evidence for acute fracture.  No marrow infiltrative process.  Modic 1 degenerative endplate changes noted at L2-L3.    Discs: Severe disc height loss at L2-L3.  No evidence for discitis.    Cord: Conus terminates at L1 and appears unremarkable.  Cauda equina appears unremarkable.    Degenerative findings:    T12-L1: No spinal canal stenosis or neuroforaminal narrowing.    L1-L2: No spinal canal stenosis or neuroforaminal narrowing.    L2-L3: Circumferential disc bulge and moderate facet arthropathy result in moderate spinal canal stenosis and mild bilateral neural foraminal narrowing.    L3-L4: Operative level.  No spinal canal stenosis or neural foraminal narrowing.    L4-L5: Operative level.  No spinal canal stenosis or neural foraminal narrowing.    L5-S1: Operative level.   Mild bilateral neural foraminal narrowing, obscured by artifact.    Paraspinal muscles & soft tissues: Moderate paraspinal muscle atrophy.    Impression  1. Postoperative changes of instrumented fusion at L3-S1, detailed above.  2. Degenerative changes primarily at L2-L3 resulting in moderate spinal canal stenosis and mild bilateral neural foraminal narrowing.      Electronically signed by: Ji Monterroso MD  Date:    02/12/2025  Time:    13:27            6/11/2025     8:11 AM 2/8/2017     8:54 AM 11/17/2015    11:23 AM 10/22/2015     2:11 PM 9/15/2015     2:19 PM 3/6/2014     9:46 AM 3/3/2014     9:21 AM   Pain Disability Index (PDI)   Family/Home Responsibilities: 6 5 7 3 6 6 3   Recreation: 6 8 6 7 7 5 10   Occupation: 6 6 8 6 7 8 6   Sexual Behavior: 6 2 5 0 7 0 4   Self Care: 6 2 3 0 5 8 1   Life-Support Activities: 6 0 0 0 0 8 0   Pain Disability Index (PDI) 42 25 35 22 37 41 30        Past Medical History:   Diagnosis Date    Allergy     Arthritis     Back pain     after trauma beginning in 195    Cataract     Chronic pain     neck and left shoulder    Cluster headache 2013    Colon polyps 2007 2007-2019: TA x5, HP x3    Degenerative disc disease     Depression     Diverticulitis 12/2013    Dry eye syndrome     Fibromyalgia 2013    GERD (gastroesophageal reflux disease)     Hepatitis 1970's    A    History of prostate biopsy 2002    Hyperlipidemia     Joint pain     Prostate cancer 07/2021    PVD (posterior vitreous detachment)     Salzmann's nodular dystrophy of left eye     Sleep apnea     Thyroid nodule 07/16/2014    Trigeminal neuralgia of left side of face     Tubular adenoma of colon 2007    5 removed [id number]    Visual disturbance 2012    problems after cataract surgery     Past Surgical History:   Procedure Laterality Date    ARTHROSCOPIC DEBRIDEMENT OF SHOULDER Left 04/19/2024    Procedure: DEBRIDEMENT, SHOULDER, ARTHROSCOPIC;  Surgeon: GEORGIANA Up MD;  Location: [facility] OR;  Service:  Orthopedics;  Laterality: Left;  Regional w/o Catheter, Interscalene, 0.5% Marcaine Plain    ARTHROSCOPIC REPAIR OF ROTATOR CUFF OF SHOULDER Left 04/19/2024    Procedure: REPAIR, ROTATOR CUFF, ARTHROSCOPIC;  Surgeon: GEORGIANA Up MD;  Location: [facility] OR;  Service: Orthopedics;  Laterality: Left;    ARTHROSCOPY OF SHOULDER WITH DECOMPRESSION OF SUBACROMIAL SPACE Left 04/19/2024    Procedure: ARTHROSCOPY, SHOULDER, WITH SUBACROMIAL SPACE DECOMPRESSION;  Surgeon: GEORGIANA Up MD;  Location: [facility] OR;  Service: Orthopedics;  Laterality: Left;    ARTHROSCOPY,SHOULDER,WITH BICEPS TENODESIS Left 04/19/2024    Procedure: ARTHROSCOPY,SHOULDER,WITH BICEPS TENODESIS;  Surgeon: GEORGIANA Up MD;  Location: [facility] OR;  Service: Orthopedics;  Laterality: Left;    BACK SURGERY      CARPAL TUNNEL RELEASE Right 05/18/2021    Procedure: RELEASE, CARPAL TUNNEL;  Surgeon: Kaye Solis MD;  Location: [facility] OR;  Service: Orthopedics;  Laterality: Right;    CATARACT EXTRACTION W/  INTRAOCULAR LENS IMPLANT Bilateral     CHOLECYSTECTOMY      COLONOSCOPY N/A 12/17/2019    Normal - Repeat 5yrs    COLONOSCOPY  2007 2007 TA x2, 2011 TA x3, 2014 HP x3, 2019 normal    COLONOSCOPY N/A 2/24/2025    Procedure: COLONOSCOPY;  Surgeon: Durga Harvey MD;  [facility]  Service: Endoscopy;  Laterality: N/A;  11/26 ref by GARO Quick Suflave, instructions handed to pt in ofc and portal. [email or web address]  Jm/pt wife Valarie Rutherford rescheduled to an earlier time/ prep inst given in office / Changed prep to miralax /referral JACK barlow  Pt rescheduled to due to new ins/pt moved procedure to      COSMETIC SURGERY  02/10/2015    Direct mid-forehead brow lift    COSMETIC SURGERY  02/10/2015    Bilateral upper lid blepahroplasty    CYSTOSCOPY WITH URETEROSCOPY, RETROGRADE PYELOGRAPHY, AND INSERTION OF STENT Left 08/19/2020    Procedure: CYSTOSCOPY, WITH RETROGRADE PYELOGRAM AND URETERAL STENT INSERTION;  Surgeon: Katelynn George MD;   Location: [facility];  Service: Urology;  Laterality: Left;    EPIDURAL STEROID INJECTION INTO LUMBAR SPINE Bilateral 3/10/2025    Procedure: Bilateral L2-3 TFESI;  Surgeon: Herman Palomino DO;  Location: [facility];  Service: Pain Management;  Laterality: Bilateral;  no sed-no ac    ESOPHAGOGASTRODUODENOSCOPY N/A 12/17/2019    Procedure: ESOPHAGOGASTRODUODENOSCOPY (EGD);  Surgeon: Amadou Hardin MD;  Location: [facility] (4TH FLR);  Service: Endoscopy;  Laterality: N/A;    ESOPHAGOGASTRODUODENOSCOPY N/A 2/24/2025    Procedure: EGD (ESOPHAGOGASTRODUODENOSCOPY);  Surgeon: Durga Harvey MD;  Location: [facility] (4TH FLR);  Service: Endoscopy;  Laterality: N/A;    EYE SURGERY      FUSION OF LUMBAR SPINE BY ANTERIOR APPROACH N/A 08/20/2020    Procedure: FUSION, SPINE, LUMBAR, ANTERIOR APPROACH L5-S1 ALIF Stand Alone;  Surgeon: Jason Caldwell MD;  Location: [facility];  Service: Neurosurgery;  Laterality: N/A;    HEMORRHOID SURGERY      with complication of chronic bleeding for 6 weeks     INJECTION OF STEROID Right 12/06/2018    Procedure: INJECTION, STEROID Right SI Joint Block and Steroid Injection;  Surgeon: Jason Caldwell MD;  Location: [facility];  Service: Neurosurgery;  Laterality: Right;  Procedure: Right SI Joint Block and Steroid Injection  Surgery Time: 30 MIN  LOS: 0  Anesthesia: MAC  Radiology: C-arm  Bed: [place] 4 Poster  Position: Prone    INJECTION OF STEROID Right 09/19/2019    Procedure: INJECTION, STEROID Procedure: Right SI joint block nd steroid injection;  Surgeon: Jason Caldwell MD;  Location: [facility];  Service: Neurosurgery;  Laterality: Right;  Procedure: Right SI joint block nd steroid injection  Surgery Time: 30 mins  LOS:   Anesthesia: General MAC  Radiology:C-arm  Bed: Regular Bed  Position: Prone    IRRIGATION AND DEBRIDEMENT OF UPPER EXTREMITY Left 04/07/2022    Procedure: IRRIGATION AND DEBRIDEMENT, UPPER EXTREMITY;  Surgeon: Kaye Solis MD;  Location: [facility];  Service:  Orthopedics;  Laterality: Left;    JOINT REPLACEMENT      KNEE SURGERY      involving arthroscopic surgery to both knees    REPAIR OF EXTENSOR TENDON Left 04/07/2022    Procedure: REPAIR, TENDON, EXTENSOR thumb, EPB and EPL;  Surgeon: Kaye Solis MD;  Location: [facility] OR;  Service: Orthopedics;  Laterality: Left;    SHOULDER SURGERY      SINUS SURGERY      left molar and sinus surgery for trigeminal neuralgia    SPINAL FUSION  06/22/2015    L3-L5 XLIF/TANA    TOTAL HIP ARTHROPLASTY  04/2012    Pt states he had total hip replacement on his left hip.    ULNAR NERVE TRANSPOSITION Left 12/16/2020    Procedure: TRANSPOSITION, NERVE, ULNAR - left carpal and cubital tunnel releases;  Surgeon: Addi Bauer MD;  Location: [facility] OR;  Service: Orthopedics;  Laterality: Left;     Social History[1]  Family History   Problem Relation Name Age of Onset    Stroke Mother Honey 86    Colon cancer Mother Honey         early/mid-60s    Uterine cancer Mother Honey 77    Pancreatitis Brother Will         acute, now s/p nathalia    Diabetes Brother Will     Macular degeneration Brother Will     Other Brother Will         foot drop    Other Father Raffy,Sr. 44        pituitary tumor- CA vs benign?    Heart attack Maternal Grandfather mgf         suspected, 50s    Migraines Sister Ida     Crohn's disease Sister Ida         w/ assoc joint issues    Arthritis Sister Ida     Tremor Daughter Rocio     Irritable bowel syndrome Son Rocky         leaning toward Crohn's dx    Neurological Disorders Son Rocky         nonspecific, benign fasciculations of calves    Tremor Son Rocky     Jaundice Grandchild Jocelyne     Other Maternal Uncle Jesse         memory issue    Other Maternal Uncle Real         memory issue    Cancer Maternal Cousin Ira         origin? maybe thyroid? 40s?    Melanoma Neg Hx      Amblyopia Neg Hx      Blindness Neg Hx      Cataracts Neg Hx      Glaucoma Neg Hx      Hypertension Neg Hx      Retinal detachment Neg  Hx      Strabismus Neg Hx      Thyroid disease Neg Hx      Allergic rhinitis Neg Hx      Allergies Neg Hx      Angioedema Neg Hx      Asthma Neg Hx      Eczema Neg Hx      Urticaria Neg Hx      Rhinitis Neg Hx      Immunodeficiency Neg Hx      Atopy Neg Hx         Review of patient's allergies indicates:   Allergen Reactions    Alphagan [brimonidine]      Patient taking MASO-B Selective Inhibitor Selegiline (Emsam)    Coumadin [warfarin]      itch    Oxycodone      hiccups       Current Outpatient Medications   Medication Sig    atorvastatin (LIPITOR) 40 MG tablet Take 1 tablet (40 mg total) by mouth once daily.    butalbital-acetaminophen-caffeine -40 mg (FIORICET, ESGIC) -40 mg per tablet Take 1 tablet by mouth daily as needed for 30 days.    celecoxib (CELEBREX) 100 MG capsule Take 2 capsules (200 mg total) by mouth 2 (two) times daily.    clindamycin (CLEOCIN) 150 MG capsule Take 4 capsules 1 hour prior to dental appointment    clonazePAM (KLONOPIN) 0.5 MG tablet Take 1 tablet by mouth twice a day as needed for anxiety    cycloSPORINE (VEVYE) 0.1 % Drop Place 1 drop into both eyes 2 (two) times a day.    ergocalciferol (VITAMIN D2) 50,000 unit Cap Take 1 capsule (50,000 Units total) by mouth every 7 days.    fremanezumab-vfrm (AJOVY SYRINGE) 225 mg/1.5 mL injection Inject 1.5 mLs (225 mg total) into the skin every 28 days.    HYDROcodone-acetaminophen (NORCO) 5-325 mg per tablet Take 1 tablet by mouth every 8 (eight) hours as needed for Pain.    HYDROcodone-acetaminophen (NORCO) 7.5-325 mg per tablet Take 1 tablet by mouth 4 (four) times daily as needed.    hydrocortisone-pramoxine (ANALPRAM-HC) 2.5-1 % Crea Place rectally 3 (three) times daily.    ketoconazole (NIZORAL) 2 % cream Apply to affected areas of body folds twice daily as needed for irritation.    lamoTRIgine (LAMICTAL) 200 MG tablet Take 1 tablet (200 mg total) by mouth once daily.    methocarbamoL (ROBAXIN) 750 MG Tab Take 1 tablet (750  mg total) by mouth 3 (three) times daily.    perfluorohexyloctane, PF, (MIEBO, PF,) 100 % Drop Place 1 drop into both eyes 4 (four) times daily.    pregabalin (LYRICA) 50 MG capsule Take 1 capsule (50 mg total) by mouth once daily.    pregabalin (LYRICA) 75 MG capsule Take 3 capsules (225 mg total) by mouth daily at night    RABEprazole (ACIPHEX) 20 mg tablet Take 1 tablet (20 mg total) by mouth 2 (two) times daily.    selegiline (EMSAM) 12 mg/24 hr Place 1 patch onto the skin once daily.    selegiline (EMSAM) 9 mg/24 hr Place 1 patch onto the skin once daily.    traZODone (DESYREL) 100 MG tablet Take 1 tablet (100 mg total) by mouth every evening.    triamcinolone acetonide 0.025% (KENALOG) 0.025 % cream Apply to affected areas of body folds twice daily as needed for irritation. Do not use for longer than 2 weeks in a row.    ubrogepant (UBRELVY) 100 mg tablet Take 1 tablet (100 mg total) by mouth every 2 (two) hours as needed for Migraine. Max 200 mg/day.    ubrogepant (UBRELVY) 100 mg tablet Take 1 tablet (100 mg total) by mouth every 2 (two) hours as needed for Migraine. Max 200 mg/day.     No current facility-administered medications for this visit.       REVIEW OF SYSTEMS:    GENERAL:  No weight loss, malaise or fevers.:NO  HEENT:   No recent changes in vision or hearing:NO  NECK:  Negative for lumps, no difficulty with swallowing.:NO  RESPIRATORY:  Negative for cough, wheezing or shortness of breath, patient denies any recent URI.:NO  CARDIOVASCULAR:  Negative for chest pain, leg swelling or palpitations.:NO  GI:  Negative for abdominal discomfort, blood in stools or black stools or change in bowel habits.:+abdominal discomfort  MUSCULOSKELETAL:  See HPI.  SKIN:  Negative for lesions, rash, and itching.:NO  PSYCH:  No mood disorder or recent psychosocial stressors.  Patients sleep is not disturbed secondary to pain.:+depression  HEMATOLOGY/LYMPHOLOGY:  Negative for prolonged bleeding, bruising easily or  "swollen nodes.  Patient is not currently taking any anti-coagulants:NO  NEURO:   No history of headaches, syncope, paralysis, seizures or tremors.+migraines  All other reviewed and negative other than HPI.    OBJECTIVE:    BP 98/66 (BP Location: Left arm, Patient Position: Sitting)   Pulse 74   Ht 5' 8" (1.727 m)   Wt 78.9 kg (173 lb 15.1 oz)   BMI 26.45 kg/m²     PHYSICAL EXAMINATION:    GENERAL: Well appearing, in no acute distress, alert and oriented x3.  PSYCH:  Mood and affect appropriate.  SKIN: Skin color, texture, turgor normal, no rashes or lesions.  HEAD/FACE:  Normocephalic, atraumatic. Cranial nerves grossly intact.    NECK:   - Did not perform pain to palpation over the cervical paraspinous muscles.   - Spurling  Did not perform.  - Did not perform pain with neck flexion, extension, or lateral flexion.     CV: RRR with palpation of the radial artery.  PULM: CTAB. No evidence of respiratory difficulty, symmetric chest rise.  GI:  Soft and non-tender.    BACK:   - No obvious deformity or signs of trauma, Normal lumbar lordotic curve  - Negative spinous process tenderness  - Positive paravertebral tenderness  - Positive pain to palpation over the facet joints of the lumbar spine.   - Positive QL / Iliac crest / Glut tenderness  - Slump test is Negative for radicular pain  - Slump test is Negative for back pain  - Supine Straight leg raising is Negative for radicular pain  - Supine Straight leg raising is Negative for back pain  - Did not perform Sustained Hip Flexion test (for discogenic pain)  - Positive Altered Gait, Posture  - Axial facet loading test Positive on the bilateral side(s)    SI Joint exam:  - Positive SI joint tenderness to palpation  - Brent's sign Positive  - Yeoman's Test: Did not perform for SI joint pain indicating anterior SI ligament involvement. Did not perform for anterior thigh pain/paresthesia which indicates femoral nerve stretch.  - Gaenslen's Test:Positive  - Finger " Blake's Sign:Positive  - SI compression test:Positive  - SI distraction test:Negative  - Thigh Thrust: Positive  - SI Thrust: Did not perform    MUSKULOSKELETAL:    EXTREMITIES:   Hip Exam:  - Log Roll Negative  - FADIR Negative  - Stinchfield Negative  - Hip Scour Positive  - GTB Tenderness Negative    MUSCULOSKELETAL:  No atrophy or tone abnormalities are noted in the UE or LE.  No deformities, edema, or skin discoloration are noted on visible skin. Good capillary refill.    NEURO: Bilateral upper and lower extremity coordination and muscle stretch reflexes are physiologic and symmetric.      NEUROLOGICAL EXAM:  MENTAL STATUS: A x O x 3, good concentration, speech is fluent and goal directed  MEMORY: recent and remote are intact  CN: CN2-12 grossly intact  MOTOR: 5/5 in all muscle groups  DTRs: 1+ intact symmetric  Sensation:    -yes Loss of sensation in a left lower and right lower feet and leg distribution.    GAIT: normal.         [1]   Social History  Socioeconomic History    Marital status:    Occupational History    Occupation: Psychotherpist    Tobacco Use    Smoking status: Never    Smokeless tobacco: Never   Substance and Sexual Activity    Alcohol use: Yes     Alcohol/week: 5.0 standard drinks of alcohol     Types: 5 Glasses of wine per week     Comment: close to daily    Drug use: No    Sexual activity: Yes     Partners: Female     Social Drivers of Health     Financial Resource Strain: Low Risk  (6/11/2025)    Overall Financial Resource Strain (CARDIA)     Difficulty of Paying Living Expenses: Not hard at all   Food Insecurity: No Food Insecurity (6/11/2025)    Hunger Vital Sign     Worried About Running Out of Food in the Last Year: Never true     Ran Out of Food in the Last Year: Never true   Transportation Needs: No Transportation Needs (6/11/2025)    PRAPARE - Transportation     Lack of Transportation (Medical): No     Lack of Transportation (Non-Medical): No   Physical Activity:  Inactive (6/11/2025)    Exercise Vital Sign     Days of Exercise per Week: 0 days     Minutes of Exercise per Session: 0 min   Stress: No Stress Concern Present (7/25/2024)    [employer] - Occupational Stress Questionnaire     Feeling of Stress : Not at all   Housing Stability: Low Risk  (6/11/2025)    Housing Stability Vital Sign     Unable to Pay for Housing in the Last Year: No     Homeless in the Last Year: No

## 2025-06-12 ENCOUNTER — CLINICAL SUPPORT (OUTPATIENT)
Dept: REHABILITATION | Facility: HOSPITAL | Age: 75
End: 2025-06-12
Payer: MEDICARE

## 2025-06-12 DIAGNOSIS — G89.29 CHRONIC LEFT-SIDED LOW BACK PAIN WITH LEFT-SIDED SCIATICA: Primary | ICD-10-CM

## 2025-06-12 DIAGNOSIS — K21.00 GASTROESOPHAGEAL REFLUX DISEASE WITH ESOPHAGITIS WITHOUT HEMORRHAGE: ICD-10-CM

## 2025-06-12 DIAGNOSIS — M54.42 CHRONIC LEFT-SIDED LOW BACK PAIN WITH LEFT-SIDED SCIATICA: Primary | ICD-10-CM

## 2025-06-12 PROCEDURE — 97112 NEUROMUSCULAR REEDUCATION: CPT | Performed by: PHYSICAL THERAPIST

## 2025-06-12 RX ORDER — RABEPRAZOLE SODIUM 20 MG/1
20 TABLET, DELAYED RELEASE ORAL 2 TIMES DAILY
Qty: 180 TABLET | Refills: 1 | Status: CANCELLED | OUTPATIENT
Start: 2025-06-12

## 2025-06-12 RX ORDER — RABEPRAZOLE SODIUM 20 MG/1
20 TABLET, DELAYED RELEASE ORAL 2 TIMES DAILY
Qty: 180 TABLET | Refills: 0 | Status: SHIPPED | OUTPATIENT
Start: 2025-06-12

## 2025-06-12 RX ORDER — TRAZODONE HYDROCHLORIDE 100 MG/1
100 TABLET ORAL NIGHTLY
Qty: 90 TABLET | Refills: 6 | Status: CANCELLED | OUTPATIENT
Start: 2025-06-12

## 2025-06-12 RX ORDER — ERGOCALCIFEROL 1.25 MG/1
50000 CAPSULE ORAL
Qty: 12 CAPSULE | Refills: 3 | Status: SHIPPED | OUTPATIENT
Start: 2025-06-12

## 2025-06-12 RX ORDER — TRAZODONE HYDROCHLORIDE 100 MG/1
100 TABLET ORAL NIGHTLY
Qty: 90 TABLET | Refills: 0 | Status: SHIPPED | OUTPATIENT
Start: 2025-06-12

## 2025-06-12 NOTE — TELEPHONE ENCOUNTER
No care due was identified.  [facility] Embedded Care Due Messages. Reference number: [id number].   6/12/2025 12:12:43 PM CDT

## 2025-06-12 NOTE — TELEPHONE ENCOUNTER
No care due was identified.  [facility] Embedded Care Due Messages. Reference number: [id number].   6/12/2025 12:13:35 PM CDT

## 2025-06-12 NOTE — TELEPHONE ENCOUNTER
Refill Decision Note   Raffy Rutherford  is requesting a refill authorization.  Brief Assessment and Rationale for Refill:  Approve     Medication Therapy Plan:        Comments:     Note composed:4:12 PM 06/12/2025

## 2025-06-12 NOTE — PROGRESS NOTES
"  Outpatient Rehab    Physical Therapy Visit    Patient Name: Raffy Rutherford Jr.  MRN: [id number]  YOB: 1950  Encounter Date: 6/12/2025    Therapy Diagnosis:   Encounter Diagnosis   Name Primary?    Chronic left-sided low back pain with left-sided sciatica Yes     Physician: Lisandra Trinh NP    Physician Orders: Eval and Treat  Medical Diagnosis: Foot drop, right foot  Foot drop, left foot  Radiculopathy, site unspecified  Spondylosis without myelopathy or radiculopathy, site unspecified  Spinal stenosis, lumbar region without neurogenic claudication  Other intervertebral disc displacement, lumbar region  Surgical Diagnosis: Not applicable for this Episode   Surgical Date: Not applicable for this Episode    Visit # / Visits Authorized:  8 / 32  Insurance Authorization Period: 2/5/2025 to 6/30/2025  Date of Evaluation: 6/3/2025  Plan of Care Certification: 6/3/2025 to 9/3/2025      PT/PTA:     Number of PTA visits since last PT visit:   Time In: 0900   Time Out: 1000  Total Time (in minutes): 60   Total Billable Time (in minutes):  30    FOTO:  Intake Score:  %  Survey Score 2:  %  Survey Score 3:  %    Precautions:       Subjective   Saw MD and they are going to do SI injection but think my R hip pain is from the severe OA.  Pain reported as 4/10.      Objective            Treatment:  Manual Therapy  MT 1: Gr IV long axis R hip  MT 2: Hip log rolling  MT 3: Lateral glide Gr IV R hip  MT 4: Inferior mob hip at 80 deg flexion  Balance/Neuromuscular Re-Education  NMR 1: SAQ 3# 3 x 10  NMR 2: HS isometrics 20x 5"  NMR 3: Quad sets 20x 5"  NMR 4: pt education HEP and how to avoid impingement  NMR 5: B SL clamshells 3x10 YTB    Time Entry(in minutes):  Manual Therapy Time Entry: 25  Neuromuscular Re-Education Time Entry: 35    Assessment & Plan   Assessment: Raffy progressed with hip strengthening to reduce pain on the R. Given exercises that will not affect his OA symptoms with new HEP. To go see brace " fitting due to issues with it today, unable to walk on alterG  Evaluation/Treatment Tolerance: Patient tolerated treatment well    The patient will continue to benefit from skilled outpatient physical therapy in order to address the deficits listed in the problem list on the initial evaluation, provide patient and family education, and maximize the patients level of independence in the home and community environments.     The patient's spiritual, cultural, and educational needs were considered, and the patient is agreeable to the plan of care and goals.           Plan: continue with POC - Alt G, strengthening glute to reduce R OA pain    Goals:   Active       Goals       Pt will demonstrate independence with initial HEP to improve their performance of their Activities of Daily Living.          Start:  06/03/25    Expected End:  07/15/25            Pt will be able to demonstrate lumbar AROM without pain for decreased tissue irritability        Start:  06/03/25    Expected End:  07/15/25            Patient will improve their manual muscle strength test for glute med to at least a 4/5 to improve their ability to complete their functional activities.         Start:  06/03/25    Expected End:  07/29/25            Patient will improve their FOTO score to at least a 49% to demonstrate improvements in their ability to complete their functional activities.         Start:  06/03/25    Expected End:  07/29/25                Corwin Merritt, PT, DPT

## 2025-06-13 ENCOUNTER — PATIENT MESSAGE (OUTPATIENT)
Dept: INTERNAL MEDICINE | Facility: CLINIC | Age: 75
End: 2025-06-13
Payer: MEDICARE

## 2025-06-13 DIAGNOSIS — M85.859 OSTEOPENIA OF NECK OF FEMUR, UNSPECIFIED LATERALITY: Primary | ICD-10-CM

## 2025-06-15 NOTE — TELEPHONE ENCOUNTER
" LOV with Haseeb Puentes MD , 8/8/2024 (Annual)  Fosamax last prescribed by PCP 8/14/24 d/c 3/10/25 by Mariah Rodriguez RN with Reason "error."  8/13/24 message encounter - bone density result indicated osteopenia. Alendronate prescribed. Retest bone density in 2 years.  Fosamax pended, if appropriate.      "

## 2025-06-15 NOTE — TELEPHONE ENCOUNTER
No care due was identified.  [facility] Embedded Care Due Messages. Reference number: [id number].   6/15/2025 9:09:22 AM CDT

## 2025-06-16 ENCOUNTER — PATIENT MESSAGE (OUTPATIENT)
Dept: REHABILITATION | Facility: HOSPITAL | Age: 75
End: 2025-06-16
Payer: MEDICARE

## 2025-06-16 ENCOUNTER — PATIENT MESSAGE (OUTPATIENT)
Dept: PAIN MEDICINE | Facility: CLINIC | Age: 75
End: 2025-06-16
Payer: MEDICARE

## 2025-06-16 DIAGNOSIS — M46.1 BILATERAL SACROILIITIS: Primary | ICD-10-CM

## 2025-06-16 RX ORDER — ALENDRONATE SODIUM 70 MG/1
70 TABLET ORAL
Qty: 4 TABLET | Refills: 11 | Status: SHIPPED | OUTPATIENT
Start: 2025-06-16 | End: 2026-06-16

## 2025-06-17 ENCOUNTER — TELEPHONE (OUTPATIENT)
Dept: PAIN MEDICINE | Facility: CLINIC | Age: 75
End: 2025-06-17
Payer: MEDICARE

## 2025-06-17 ENCOUNTER — CLINICAL SUPPORT (OUTPATIENT)
Dept: REHABILITATION | Facility: HOSPITAL | Age: 75
End: 2025-06-17
Attending: PHYSICAL THERAPIST
Payer: MEDICARE

## 2025-06-17 DIAGNOSIS — M46.1 BILATERAL SACROILIITIS: Primary | ICD-10-CM

## 2025-06-17 DIAGNOSIS — M54.42 CHRONIC LEFT-SIDED LOW BACK PAIN WITH LEFT-SIDED SCIATICA: Primary | ICD-10-CM

## 2025-06-17 DIAGNOSIS — G89.29 CHRONIC LEFT-SIDED LOW BACK PAIN WITH LEFT-SIDED SCIATICA: Primary | ICD-10-CM

## 2025-06-17 PROCEDURE — 97112 NEUROMUSCULAR REEDUCATION: CPT | Performed by: PHYSICAL THERAPIST

## 2025-06-17 NOTE — TELEPHONE ENCOUNTER
----- Message from Herman Goins DO sent at 2025  5:12 PM CDT -----  Regarding: Order for JAQUI RICHARDSON JRLuisa    Patient Name: JAQUI RICHARDSON JR.([id number])  Sex: Male  : 1950      PCP: FABI ELLIOTT    Center: [facility] CENTRAL BILLING OFFICE     Types of orders made on 2025: Procedure Request    Order Date:2025  Ordering User:HERMAN GOINS [[id number]  Encounter Provider:Herman Goins DO [[id number]]  Authorizing Provider: Herman Goins DO [[id number]]  Department:[facility] PAIN MANAGEMENT[[id number]]    Common Order Information  Procedure -> Sacroiliac Injection (Specify laterality)     Pre-op Diagnosis -> Sacroiliitis     Order Specific Information  Order: Procedure Request Order for Pain Management [Custom: [id number]]  Order #:          [id number]: 1 FUTURE    Priority: Routine  Class: Clinic Performed    Future Order Information      Expires on:2026            Expected by:2025                   Comment:25 - b/l SIJ - RN sed - no AC    Associated Diagnoses      M46.1 Bilateral sacroiliitis      Physician -> addieu         Is patient on anti-coagulants? -> No         Facility Name: -> [facility]           Priority: Routine  Class: Clinic Performed    Future Order Information      Expires on:2026            Expected by:2025                   Comment:25 - b/l SIJ - RN sed - no AC    Associated Diagnoses      M46.1 Bilateral sacroiliitis      Procedure -> Sacroiliac Injection (Specify laterality)         Physician -> joshuayu         Is patient on anti-coagulants? -> No         Pre-op Diagnosis -> Sacroiliitis         Facility Name: -> [facility]

## 2025-06-17 NOTE — PROGRESS NOTES
"  Outpatient Rehab    Physical Therapy Visit    Patient Name: Raffy Rutherford Jr.  MRN: [id number]  YOB: 1950  Encounter Date: 6/17/2025    Therapy Diagnosis:   Encounter Diagnosis   Name Primary?    Chronic left-sided low back pain with left-sided sciatica Yes     Physician: Lisandra Trinh NP    Physician Orders: Eval and Treat  Medical Diagnosis: Foot drop, right foot  Foot drop, left foot  Radiculopathy, site unspecified  Spondylosis without myelopathy or radiculopathy, site unspecified  Spinal stenosis, lumbar region without neurogenic claudication  Other intervertebral disc displacement, lumbar region  Surgical Diagnosis: Not applicable for this Episode   Surgical Date: Not applicable for this Episode    Visit # / Visits Authorized:  9 / 32  Insurance Authorization Period: 2/5/2025 to 6/30/2025  Date of Evaluation: 6/3/2025  Plan of Care Certification: 6/3/2025 to 9/3/2025      PT/PTA:     Number of PTA visits since last PT visit:   Time In: 1400   Time Out: 1500  Total Time (in minutes): 60   Total Billable Time (in minutes): 3030    FOTO:  Intake Score:  %  Survey Score 2:  %  Survey Score 3:  %    Precautions:       Subjective   Better after last session but he still has not gotten his braces fixed.  Pain reported as 2/10.      Objective            Treatment:  Manual Therapy  MT 1: Gr IV long axis R hip  MT 2: Hip log rolling  MT 3: Lateral glide Gr IV R hip  MT 4: Inferior mob hip at 80 deg flexion  MT 5: Belt distraction R hip  Balance/Neuromuscular Re-Education  NMR 1: LAQ 30x no weight  NMR 2: Swiss ball roll outs 20x  NMR 3: Swiss ball KTC 30x  NMR 4: Seated thoracic/CT extension 2 x 10 ea  NMR 5: Supine GBT clamsheels 20x 3"    Time Entry(in minutes):  Manual Therapy Time Entry: 15  Neuromuscular Re-Education Time Entry: 45    Assessment & Plan   Assessment: Progressing with thoracic extension exercises to improve posture. Educated he needs to use the swiss ball at home. Will return " early tomorrow so limited loading to hip today  Evaluation/Treatment Tolerance: Patient tolerated treatment well    The patient will continue to benefit from skilled outpatient physical therapy in order to address the deficits listed in the problem list on the initial evaluation, provide patient and family education, and maximize the patients level of independence in the home and community environments.     The patient's spiritual, cultural, and educational needs were considered, and the patient is agreeable to the plan of care and goals.           Plan: continue with POC - Alt G, strengthening glute to reduce R OA pain    Goals:   Active       Goals       Pt will demonstrate independence with initial HEP to improve their performance of their Activities of Daily Living.          Start:  06/03/25    Expected End:  07/15/25            Pt will be able to demonstrate lumbar AROM without pain for decreased tissue irritability        Start:  06/03/25    Expected End:  07/15/25            Patient will improve their manual muscle strength test for glute med to at least a 4/5 to improve their ability to complete their functional activities.         Start:  06/03/25    Expected End:  07/29/25            Patient will improve their FOTO score to at least a 49% to demonstrate improvements in their ability to complete their functional activities.         Start:  06/03/25    Expected End:  07/29/25                Corwin Merritt, PT, DPT
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
## 2025-06-18 ENCOUNTER — OFFICE VISIT (OUTPATIENT)
Facility: CLINIC | Age: 75
End: 2025-06-18
Payer: MEDICARE

## 2025-06-18 ENCOUNTER — TELEPHONE (OUTPATIENT)
Facility: CLINIC | Age: 75
End: 2025-06-18
Payer: MEDICARE

## 2025-06-18 VITALS
WEIGHT: 173.5 LBS | SYSTOLIC BLOOD PRESSURE: 105 MMHG | HEIGHT: 68 IN | HEART RATE: 73 BPM | BODY MASS INDEX: 26.3 KG/M2 | DIASTOLIC BLOOD PRESSURE: 72 MMHG

## 2025-06-18 DIAGNOSIS — G62.89 OTHER POLYNEUROPATHY: Primary | ICD-10-CM

## 2025-06-18 DIAGNOSIS — M47.812 CERVICAL SPONDYLOSIS WITHOUT MYELOPATHY: ICD-10-CM

## 2025-06-18 DIAGNOSIS — G62.9 NEUROPATHY: ICD-10-CM

## 2025-06-18 PROCEDURE — 99999 PR PBB SHADOW E&M-EST. PATIENT-LVL IV: CPT | Mod: PBBFAC,,, | Performed by: PSYCHIATRY & NEUROLOGY

## 2025-06-19 ENCOUNTER — PATIENT MESSAGE (OUTPATIENT)
Dept: NEUROLOGY | Facility: CLINIC | Age: 75
End: 2025-06-19
Payer: MEDICARE

## 2025-06-19 NOTE — TELEPHONE ENCOUNTER
Called patient to schedule EMG appt and f/u appt both on 7/16 at 9am and 10am respectively. Patient verbalized agreement. Pt also said they would cancel their physical therapy appointment they had scheduled for that same date/time.

## 2025-06-20 ENCOUNTER — TELEPHONE (OUTPATIENT)
Dept: PAIN MEDICINE | Facility: HOSPITAL | Age: 75
End: 2025-06-20
Payer: MEDICARE

## 2025-06-23 ENCOUNTER — CLINICAL SUPPORT (OUTPATIENT)
Dept: REHABILITATION | Facility: HOSPITAL | Age: 75
End: 2025-06-23
Payer: MEDICARE

## 2025-06-23 DIAGNOSIS — M54.42 CHRONIC LEFT-SIDED LOW BACK PAIN WITH LEFT-SIDED SCIATICA: Primary | ICD-10-CM

## 2025-06-23 DIAGNOSIS — G89.29 CHRONIC LEFT-SIDED LOW BACK PAIN WITH LEFT-SIDED SCIATICA: Primary | ICD-10-CM

## 2025-06-23 PROCEDURE — 97112 NEUROMUSCULAR REEDUCATION: CPT | Performed by: PHYSICAL THERAPIST

## 2025-06-23 NOTE — PROGRESS NOTES
"  Outpatient Rehab    Physical Therapy Visit    Patient Name: Raffy Rutherford Jr.  MRN: [id number]  YOB: 1950  Encounter Date: 6/23/2025    Therapy Diagnosis:   Encounter Diagnosis   Name Primary?    Chronic left-sided low back pain with left-sided sciatica Yes     Physician: Lisandra Trinh NP    Physician Orders: Eval and Treat  Medical Diagnosis: Foot drop, right foot  Foot drop, left foot  Radiculopathy, site unspecified  Spondylosis without myelopathy or radiculopathy, site unspecified  Spinal stenosis, lumbar region without neurogenic claudication  Other intervertebral disc displacement, lumbar region  Surgical Diagnosis: Not applicable for this Episode   Surgical Date: Not applicable for this Episode    Visit # / Visits Authorized:  10 / 32  Insurance Authorization Period: 2/5/2025 to 6/30/2025  Date of Evaluation: 6/3/2025  Plan of Care Certification: 6/3/2025 to 9/3/2025      PT/PTA:     Number of PTA visits since last PT visit:   Time In: 1100   Time Out: 1200  Total Time (in minutes): 60   Total Billable Time (in minutes): 3030    FOTO:  Intake Score:  %  Survey Score 2:  %  Survey Score 3:  %    Precautions:       Subjective   Back is doing better, will get braces done later this week. Got his swiss ball back.  Pain reported as 2/10.      Objective            Treatment:  Manual Therapy  MT 1: Gr IV long axis R hip  MT 2: Hip log rolling  MT 3: Lateral glide Gr IV R hip  MT 4: Inferior mob hip at 80 deg flexion  MT 5: Belt distraction R hip  Balance/Neuromuscular Re-Education  NMR 2: Swiss ball roll outs 20x  NMR 3: Supine palloff press OTB 2 x 15  NMR 5: Supine GBT clamsheels 20x 3"  NMR 6: B SL hip abd 3x10/3"  NMR 9: SAQ 3# 3x10    Time Entry(in minutes):  Manual Therapy Time Entry: 25  Neuromuscular Re-Education Time Entry: 35    Assessment & Plan   Assessment: Got the most relief with belt lateral gapping. Progressing glute med strengthening and low abd activation to reduce lumbar spine " pain.   Evaluation/Treatment Tolerance: Patient tolerated treatment well    The patient will continue to benefit from skilled outpatient physical therapy in order to address the deficits listed in the problem list on the initial evaluation, provide patient and family education, and maximize the patients level of independence in the home and community environments.     The patient's spiritual, cultural, and educational needs were considered, and the patient is agreeable to the plan of care and goals.           Plan: continue with POC - Alt G, strengthening glute to reduce R OA pain    Goals:   Active       Goals       Pt will demonstrate independence with initial HEP to improve their performance of their Activities of Daily Living.          Start:  06/03/25    Expected End:  07/15/25            Pt will be able to demonstrate lumbar AROM without pain for decreased tissue irritability        Start:  06/03/25    Expected End:  07/15/25            Patient will improve their manual muscle strength test for glute med to at least a 4/5 to improve their ability to complete their functional activities.         Start:  06/03/25    Expected End:  07/29/25            Patient will improve their FOTO score to at least a 49% to demonstrate improvements in their ability to complete their functional activities.         Start:  06/03/25    Expected End:  07/29/25                Corwin Merritt, PT, DPT

## 2025-06-24 ENCOUNTER — PATIENT MESSAGE (OUTPATIENT)
Dept: REHABILITATION | Facility: HOSPITAL | Age: 75
End: 2025-06-24
Payer: MEDICARE

## 2025-06-24 ENCOUNTER — TELEPHONE (OUTPATIENT)
Dept: PHARMACY | Facility: CLINIC | Age: 75
End: 2025-06-24
Payer: MEDICARE

## 2025-06-24 ENCOUNTER — HOSPITAL ENCOUNTER (OUTPATIENT)
Facility: HOSPITAL | Age: 75
Discharge: HOME | End: 2025-06-24
Attending: STUDENT IN AN ORGANIZED HEALTH CARE EDUCATION/TRAINING PROGRAM | Admitting: STUDENT IN AN ORGANIZED HEALTH CARE EDUCATION/TRAINING PROGRAM
Payer: MEDICARE

## 2025-06-24 VITALS
HEART RATE: 59 BPM | TEMPERATURE: 98 F | DIASTOLIC BLOOD PRESSURE: 67 MMHG | SYSTOLIC BLOOD PRESSURE: 120 MMHG | OXYGEN SATURATION: 90 % | RESPIRATION RATE: 16 BRPM

## 2025-06-24 DIAGNOSIS — M46.1 SACROILIITIS: Primary | ICD-10-CM

## 2025-06-24 PROCEDURE — 63600175 PHARM REV CODE 636 W HCPCS: Performed by: STUDENT IN AN ORGANIZED HEALTH CARE EDUCATION/TRAINING PROGRAM

## 2025-06-24 PROCEDURE — 25500020 PHARM REV CODE 255: Performed by: STUDENT IN AN ORGANIZED HEALTH CARE EDUCATION/TRAINING PROGRAM

## 2025-06-24 PROCEDURE — 27096 INJECT SACROILIAC JOINT: CPT | Mod: 50 | Performed by: STUDENT IN AN ORGANIZED HEALTH CARE EDUCATION/TRAINING PROGRAM

## 2025-06-24 PROCEDURE — 27096 INJECT SACROILIAC JOINT: CPT | Mod: 50,,, | Performed by: STUDENT IN AN ORGANIZED HEALTH CARE EDUCATION/TRAINING PROGRAM

## 2025-06-24 RX ORDER — SODIUM CHLORIDE 9 MG/ML
500 INJECTION, SOLUTION INTRAVENOUS CONTINUOUS
Status: DISCONTINUED | OUTPATIENT
Start: 2025-06-24 | End: 2025-06-24 | Stop reason: HOSPADM

## 2025-06-24 RX ORDER — LIDOCAINE HYDROCHLORIDE 20 MG/ML
INJECTION, SOLUTION EPIDURAL; INFILTRATION; INTRACAUDAL; PERINEURAL
Status: DISCONTINUED | OUTPATIENT
Start: 2025-06-24 | End: 2025-06-24 | Stop reason: HOSPADM

## 2025-06-24 RX ORDER — TRIAMCINOLONE ACETONIDE 40 MG/ML
INJECTION, SUSPENSION INTRA-ARTICULAR; INTRAMUSCULAR
Status: DISCONTINUED | OUTPATIENT
Start: 2025-06-24 | End: 2025-06-24 | Stop reason: HOSPADM

## 2025-06-24 RX ORDER — FENTANYL CITRATE 50 UG/ML
INJECTION, SOLUTION INTRAMUSCULAR; INTRAVENOUS
Status: DISCONTINUED | OUTPATIENT
Start: 2025-06-24 | End: 2025-06-24 | Stop reason: HOSPADM

## 2025-06-24 RX ORDER — BUPIVACAINE HYDROCHLORIDE 2.5 MG/ML
INJECTION, SOLUTION EPIDURAL; INFILTRATION; INTRACAUDAL; PERINEURAL
Status: DISCONTINUED | OUTPATIENT
Start: 2025-06-24 | End: 2025-06-24 | Stop reason: HOSPADM

## 2025-06-24 RX ORDER — MIDAZOLAM HYDROCHLORIDE 1 MG/ML
INJECTION INTRAMUSCULAR; INTRAVENOUS
Status: DISCONTINUED | OUTPATIENT
Start: 2025-06-24 | End: 2025-06-24 | Stop reason: HOSPADM

## 2025-06-24 NOTE — PLAN OF CARE
Bandage(s) are clean, dry and intact. No drainage noted.  No edema or skin discoloration noted in perimeter of site.    Discharge instructions given and explained to patient with verbalization of understanding all instructions. Patients v/s stable, denies n/v and tolerating po, rates pain level tolerable, IV removed, and ready for patient discharge home.

## 2025-06-24 NOTE — OP NOTE
"PROCEDURE: bilateral Sacroiliac Joint Injection    Patient Name: Raffy Rutherford Jr.  MRN: [id number]    PROCEDURE DATE: 6/24/2025    Injection # 1 this year    DIAGNOSIS: Sacroiliitis (M46.1)  CPT CODE: 34074    POSTPROCEDURE DIAGNOSIS Same    PHYSICIAN: Herman Palomino DO  NEEDLE TYPE: - 22G 3.5" Spinal Needle  MEDICATIONS INJECTED: 5ml 0.25% Bupivacaine + 20mg Kenalog (40mg/ml) at each site  LOCAL ANESTHETIC USED: Lidocaine 1%, 3-4ml at each level  CONTRAST: 1-2ml Omni 300    Sedation Medications - Mild Sedation with 1mg Versed and 50 mcg Fentanyl    Estimated Blood Loss - <2ml  Drains: None  Specimens Removed: None  Urine Output - Not Measured  Complications: None  Outcome: good    Informed Consent:  The patient's condition and proposed procedures, risks, and alternatives were discussed with the patient or responsible party.  The patient's / responsible party's questions were answered.   The patient / responsible party appeared to understand and chose to proceed.  Informed consent was obtained.    Procedure in Detail:  The patient was taken back to the OR fluoroscopy suite and placed in a prone position. The skin overlying the injection site was prepped and draped in an aseptic fashion. The target injection site (see above) was identified with fluoroscopy.     Procedural Pause:  A procedural pause verifying correct patient, medical record number, allergies, medications to be administered, current vital signs, and surgical site was performed immediately prior to beginning the procedure.    The skin and subcutaneous tissue overlying the target site of injection was anesthetized using 2-3 ml of 1% lidocaine MPF with a 25-gauge, 1½ -inch needle.  The above noted spinal needle was directed into the inferior aspect of the above noted sacroiliac joint using a posterior approach.  A "giving way" at the needle hub was noted once the dorsal sacroiliac and interosseous ligaments were engaged.  After negative aspiration " for heme, the above noted contrast was injected, outlining the coin-shaped inferior recess of the joint.  Provocation response consisting of intense buttock pain was not positive.  After negative aspiration for heme, the above noted solution was slowly injected.  The needle was then retracted approximately FDC and the needle track was flushed with 0.5 mL of lidocaine 1%.  The needle(s) was then removed. A sterile bandage was placed over the injection site.     The same procedural technique outlined above was repeated on the OPPOSITE side.    The heart rate, pulse oximetry, and blood pressure were continuously monitored throughout the procedure.  The procedure was well tolerated. He was carefully escorted to the recovery room in stable condition. Patient was monitored by RN for recovery period.  The patient will be contacted in the next few days to determine extent of relief.  Patient was given post procedure and discharge instructions to follow at home.  The patient was discharged in a stable condition.    Note Electronically Signed By:  Herman Sung  06/24/2025

## 2025-06-24 NOTE — PLAN OF CARE
Pt in preop bay 21, VSS, and IV inserted. Pt denies any open wounds on body or the use of any weight loss injections. Pt needs procedural consents signed, site marked, H&P updated  otherwise ready to roll. Procedural consents verified with pt.

## 2025-06-24 NOTE — DISCHARGE SUMMARY
[facility]  Discharge Note  Short Stay    Procedure(s) (LRB):  b/l SIJ inj (Bilateral)      OUTCOME: Patient tolerated treatment/procedure well without complication and is now ready for discharge.    DISPOSITION: Home or Self Care    FINAL DIAGNOSIS:  <principal problem not specified>    FOLLOWUP: In clinic    DISCHARGE INSTRUCTIONS:  No discharge procedures on file.     TIME SPENT ON DISCHARGE: 10 minutes

## 2025-06-24 NOTE — TELEPHONE ENCOUNTER
[facility]Population [facility] Chart Review & Patient Outreach Details For Medication Adherence Project    Reason for Outreach Encounter: 3rd Party payor non-compliance report ([employer], [employer], [employer], etc)  2.  Patient Outreach Method: Reviewed patient chart   3.   Medication in question:    Hyperlipidemia Medications              atorvastatin (LIPITOR) 40 MG tablet Take 1 tablet (40 mg total) by mouth once daily.               LF 90 ds 5/16/25    4.  Reviewed and or Updates Made To: Patient Chart  5. Outreach Outcomes and/or actions taken: Patient filled medication and is on track to be adherent  Additional Notes:

## 2025-06-24 NOTE — DISCHARGE INSTRUCTIONS
[facility] [place] [place]  [facility] service # [phone]  On-call pager for emergency# [phone]     Sacroiliac Joint Injection POST-PROCEDURE INSTRUCTIONS:    Today you had an injection that included a steroid medications.  The steroid may or may not have been mixed with a local anesthetic when it was injected.   Your injection was in your Sacroiliac joint today.  This is both a therapeutic and diagnostic procedure.  What this means is that the diagnostic portion helps give us an idea if the hip is your primary pain generator, and the therapeutic portion is to hopefully give you longer lasting relief  The DIAGNOSTIC portion will only last a few hours.  This is from the local anesthetic that was placed in your SI joint.  The pain relief that you get immediately after the injection (for several hours) is the pain that the SI is most likely responsible for.  If you do not get pain relief, then your SI is less likely to be the cause of your pain.  The THERAPEUTIC portion of the injection is from the steroid.  This typically takes 3-5 days to start working.  The pain relief from this will hopefully last several months.  You may get side effects from the steroid.  This is not uncommon.  Symptoms include: elevated blood sugar, elevated blood pressure, headache, flushing, nausea, insomnia.  These symptoms are transient and will resolve within 1-3 days.  If symptoms last longer than this please contact our office or head to the emergency room.  You may notice that your pain worsens for a short period of time after the numbing medicine wears off, this would not be unusual due to the pressure and trauma from the needle.      What you need to do:    Keep a record of your response to the injection you had today.    How much relief did you get?   When did the relief start and how long did it last?  Were you able to decrease the use of any of your pain medications?  Were you able  to increase your level of activity?  How long did the relief last?    What to watch out for:    If you experience any of the following symptoms after your procedure, please notify the messaging service immediately (see above for contact information):   fever (increased oral temperature)   bleeding or swelling at the injection site,    drainage, rash or redness at the injection site    possible signs of infection    increased pain at the injection site   worsening of your usual pain   severe headache   new or worsening numbness    new leg weakness, or    changes in bowel and/or bladder function: urinating or defecating on yourself and not knowing that you did it.    PLEASE FOLLOW ALL INSTRUCTIONS CAREFULLY     Do not engage in strenuous activity (e.g., lifting or pushing heavy objects or repeated bending) for 24 hours.     Do not take a bath, swim or use Jacuzzi for 24 hours after procedure. (A shower is fine).   Remove any Band-Aids when you get home.    Use cold/ice, as needed for comfort.  We recommend the use of cold therapy alternating on for 20 minutes, off for 20 minutes.    Do not apply direct heat (heating pad or heat packs) to the injection site for 24 hours.     Resume your usual medications, unless instructed otherwise by your Pain Physician.      IF AT ANY POINT YOU ARE VERY CONCERNED ABOUT YOUR SYMPTOMS, PLEASE GO TO THE EMERGENCY ROOM.    If you develop worsening pain, weakness, numbness, lose bowel or bladder control (i.e., having an accident where you did not even know you had to go to the bathroom and suddenly noticed you soiled yourself), saddle anesthesia (a loss of sensation restricted to the area of the buttocks, anus and between the legs -- i.e., those parts of your body that would touch a saddle if you were sitting on one) you need to go immediately to the emergency department for evaluation and  treatment.    ----------------------------------------------------------------------------------------------------------------------------------------------------------------  If you received Sedation please read the following instructions:  POST SEDATION INSTRUCTIONS    Today you received intravenous medication (also known as sedation) that was used to help you relax and/or decrease discomfort during your procedure. This medication will be acting in your body for the next 24 hours, so you might feel a little tired or sleepy. This feeling will slowly wear off.   Common side effects associated with these medications include: drowsiness, dizziness, sleepiness, confusion, feeling excited, difficulty remembering things, lack of steadiness with walking or balance, loss of fine muscle control, slowed reflexes, difficulty focusing, and blurred vision.  Some over-the-counter and prescription medications (e.g., muscle relaxants, opioids, mood-altering medications, sedatives/hypnotics, antihistamines) can interact with the intravenous medication you received and cause an increased risk of the side effects listed above in addition to other potentially life threatening side effects. Use extreme caution if you are taking such medications, and consult with your Pain Physician or prescribing physician if you have any questions.  For the next 12-24 hours:    DO NOT--Drive a car, operate machinery or power tools   DO NOT--Drink any alcoholic beverages (not even beer), they may dangerously increase the risk of side effects.    DO NOT--Make any important legal or business decisions or sign important documents.  We advise you to have someone to assist you at home. Move slowly and carefully. Do not make sudden changes in position. Be aware of dizziness or light-headedness and move accordingly.   If you seek medical treatment within 24 hours, let the nurse or doctor caring for you know that you have received the above medications. If you  have any questions or concerns related to your sedation or treatment today please contact us.

## 2025-06-25 ENCOUNTER — PATIENT MESSAGE (OUTPATIENT)
Dept: REHABILITATION | Facility: HOSPITAL | Age: 75
End: 2025-06-25
Payer: MEDICARE

## 2025-06-25 RX ORDER — LAMOTRIGINE 200 MG/1
200 TABLET ORAL DAILY
Qty: 90 TABLET | Refills: 3 | Status: SHIPPED | OUTPATIENT
Start: 2025-06-25

## 2025-06-26 ENCOUNTER — CLINICAL SUPPORT (OUTPATIENT)
Dept: REHABILITATION | Facility: HOSPITAL | Age: 75
End: 2025-06-26
Payer: MEDICARE

## 2025-06-26 DIAGNOSIS — M54.42 CHRONIC LEFT-SIDED LOW BACK PAIN WITH LEFT-SIDED SCIATICA: Primary | ICD-10-CM

## 2025-06-26 DIAGNOSIS — G89.29 CHRONIC LEFT-SIDED LOW BACK PAIN WITH LEFT-SIDED SCIATICA: Primary | ICD-10-CM

## 2025-06-26 PROCEDURE — 97110 THERAPEUTIC EXERCISES: CPT | Mod: CQ

## 2025-06-26 PROCEDURE — 97112 NEUROMUSCULAR REEDUCATION: CPT | Mod: CQ

## 2025-06-26 PROCEDURE — 97140 MANUAL THERAPY 1/> REGIONS: CPT | Mod: CQ

## 2025-06-26 NOTE — PROGRESS NOTES
"  Outpatient Rehab    Physical Therapy Visit    Patient Name: Raffy Rutherford Jr.  MRN: [id number]  YOB: 1950  Encounter Date: 6/26/2025    Therapy Diagnosis: No diagnosis found.  Physician: Lisandra Trinh NP    Physician Orders: Eval and Treat  Medical Diagnosis: Foot drop, right foot  Foot drop, left foot  Radiculopathy, site unspecified  Spondylosis without myelopathy or radiculopathy, site unspecified  Spinal stenosis, lumbar region without neurogenic claudication  Other intervertebral disc displacement, lumbar region  Surgical Diagnosis: Not applicable for this Episode   Surgical Date: Not applicable for this Episode  Days Since Last Surgery: Not applicable for this Episode    Visit # / Visits Authorized:  11 / 52  Insurance Authorization Period: 2/5/2025 to 12/31/2025  Date of Evaluation:   Plan of Care Certification:       PT/PTA:     Number of PTA visits since last PT visit:   Time In: 1600   Time Out: 1700  Total Time (in minutes): 60   Total Billable Time (in minutes): 55    FOTO:  Intake Score:  %  Survey Score 2:  %  Survey Score 3:  %    Precautions:       Subjective   stating severe flare up driving offer in car today. Received two CSI in SI joints Tuesday..  Pain reported as 8/10. minimal pain in prior to driving to therapy.    Objective            Treatment:  Therapeutic Exercise  TE 1: Heel prop x5 min  TE 2: Knee extension mobilization with strap 10x5"  TE 3: Knee flexion off table x15 reps  Manual Therapy  MT 1: patellar, fat pad mobilizations  MT 2: Hip log rolling  MT 3: Lateral glide  R hip  MT 4: Inferior mob hip at 80 deg flexion  Balance/Neuromuscular Re-Education  NMR 1: Quad set with strap assist 3x5  NMR 2: SLR 3x10  NMR 3: Supine palloff press OTB 2 x 15  NMR 4: seated SB rollouts 30x  NMR 5: Supine GBT clamsheels 20x 3"    Time Entry(in minutes):       Assessment & Plan   Assessment: Pt in severe pain when arrived for tx. Pain relief with manual therapy and B LE up SB. " Continue with glute med strengthening and low abd activation to reduce lumbar spine pain per Supervising PT.        The patient will continue to benefit from skilled outpatient physical therapy in order to address the deficits listed in the problem list on the initial evaluation, provide patient and family education, and maximize the patients level of independence in the home and community environments.     The patient's spiritual, cultural, and educational needs were considered, and the patient is agreeable to the plan of care and goals.           Plan: continue with POC - Alt G, strengthening glute to reduce R OA pain    Goals:     Alex Huffman, PTA, STS

## 2025-06-30 ENCOUNTER — OFFICE VISIT (OUTPATIENT)
Dept: INTERNAL MEDICINE | Facility: CLINIC | Age: 75
End: 2025-06-30
Payer: MEDICARE

## 2025-06-30 VITALS
HEART RATE: 51 BPM | HEIGHT: 68 IN | WEIGHT: 171.31 LBS | OXYGEN SATURATION: 96 % | SYSTOLIC BLOOD PRESSURE: 126 MMHG | DIASTOLIC BLOOD PRESSURE: 70 MMHG | BODY MASS INDEX: 25.96 KG/M2

## 2025-06-30 DIAGNOSIS — K40.90 RIGHT INGUINAL HERNIA: ICD-10-CM

## 2025-06-30 DIAGNOSIS — G43.709 CHRONIC MIGRAINE WITHOUT AURA WITHOUT STATUS MIGRAINOSUS, NOT INTRACTABLE: ICD-10-CM

## 2025-06-30 DIAGNOSIS — M47.819 SPONDYLOSIS WITHOUT MYELOPATHY: ICD-10-CM

## 2025-06-30 DIAGNOSIS — C61 PROSTATE CANCER: ICD-10-CM

## 2025-06-30 DIAGNOSIS — R73.09 ELEVATED GLUCOSE LEVEL: ICD-10-CM

## 2025-06-30 DIAGNOSIS — R05.3 CHRONIC COUGH: ICD-10-CM

## 2025-06-30 DIAGNOSIS — R63.4 WEIGHT LOSS: ICD-10-CM

## 2025-06-30 DIAGNOSIS — R10.31 RIGHT GROIN PAIN: ICD-10-CM

## 2025-06-30 DIAGNOSIS — R10.13 EPIGASTRIC PAIN: ICD-10-CM

## 2025-06-30 DIAGNOSIS — G60.0 CMT (CHARCOT-MARIE-TOOTH DISEASE): ICD-10-CM

## 2025-06-30 DIAGNOSIS — Z00.00 ROUTINE PHYSICAL EXAMINATION: Primary | ICD-10-CM

## 2025-06-30 DIAGNOSIS — K21.9 GASTROESOPHAGEAL REFLUX DISEASE WITHOUT ESOPHAGITIS: ICD-10-CM

## 2025-06-30 DIAGNOSIS — G60.9 IDIOPATHIC PERIPHERAL NEUROPATHY: ICD-10-CM

## 2025-06-30 PROCEDURE — 99999 PR PBB SHADOW E&M-EST. PATIENT-LVL V: CPT | Mod: PBBFAC,,, | Performed by: INTERNAL MEDICINE

## 2025-06-30 RX ORDER — PREGABALIN 75 MG/1
225 CAPSULE ORAL NIGHTLY
Qty: 270 CAPSULE | Refills: 1 | Status: SHIPPED | OUTPATIENT
Start: 2025-06-30

## 2025-06-30 RX ORDER — PREGABALIN 25 MG/1
25 CAPSULE ORAL 2 TIMES DAILY
Qty: 180 CAPSULE | Refills: 1 | Status: SHIPPED | OUTPATIENT
Start: 2025-06-30

## 2025-06-30 NOTE — PROGRESS NOTES
Subjective:       Patient ID: Raffy Rutherford Jr. is a 75 y.o. male.    Chief Complaint: Annual Exam    HPI: Patient seen for annual exam but also for review of medical problems including weight loss of about 8-10 lb in the last few months, unintentional.  Chronic back and leg pain, chronic neuropathy.  New right groin pain which he thinks is current inguinal hernia pain since physical therapy for his back.  He has CMT, chronic pain for which he sees a pain specialist.  Previously low iron.  Prostate cancer with PSA in the 10-11 range.  We reviewed medications including chronic Lyrica,  was reviewed.  We reviewed his labs and will update a number of labs.  He had a colonoscopy in February of this year but was noted to have some right-sided abdominal pain on exam and with his unintentional weight loss and groin pain we would like to update labs and CT scans for evaluation of weight loss.      Review of Systems   Constitutional:  Positive for fatigue and unexpected weight change (loss). Negative for activity change, appetite change, fever and night sweats.   Respiratory:  Positive for cough (cough periodically when he takes a deep breath or holds his breath.).    Gastrointestinal:  Negative for abdominal distention, abdominal pain, blood in stool and change in bowel habit.   Genitourinary:  Negative for difficulty urinating.   Musculoskeletal:  Positive for arthralgias, back pain, gait problem and leg pain.           Past Medical History:   Diagnosis Date    Allergy     Arthritis     Back pain     after trauma beginning in 195    Cataract     Chronic pain     neck and left shoulder    Cluster headache 2013    Colon polyps 2007 2007-2019: TA x5, HP x3    Degenerative disc disease     Depression     Diverticulitis 12/2013    Dry eye syndrome     Fibromyalgia 2013    GERD (gastroesophageal reflux disease)     Hepatitis 1970's    A    History of prostate biopsy 2002    Hyperlipidemia     Joint pain     Prostate  cancer 07/2021    PVD (posterior vitreous detachment)     Salzmann's nodular dystrophy of left eye     Sleep apnea     Thyroid nodule 07/16/2014    Trigeminal neuralgia of left side of face     Tubular adenoma of colon 2007    5 removed [id number]    Visual disturbance 2012    problems after cataract surgery     Past Surgical History:   Procedure Laterality Date    ARTHROSCOPIC DEBRIDEMENT OF SHOULDER Left 04/19/2024    Procedure: DEBRIDEMENT, SHOULDER, ARTHROSCOPIC;  Surgeon: GEORGIANA Up MD;  Location: [facility] OR;  Service: Orthopedics;  Laterality: Left;  Regional w/o Catheter, Interscalene, 0.5% Marcaine Plain    ARTHROSCOPIC REPAIR OF ROTATOR CUFF OF SHOULDER Left 04/19/2024    Procedure: REPAIR, ROTATOR CUFF, ARTHROSCOPIC;  Surgeon: GEORGIANA Up MD;  Location: [facility] OR;  Service: Orthopedics;  Laterality: Left;    ARTHROSCOPY OF SHOULDER WITH DECOMPRESSION OF SUBACROMIAL SPACE Left 04/19/2024    Procedure: ARTHROSCOPY, SHOULDER, WITH SUBACROMIAL SPACE DECOMPRESSION;  Surgeon: GEORGIANA Up MD;  Location: [facility] OR;  Service: Orthopedics;  Laterality: Left;    ARTHROSCOPY,SHOULDER,WITH BICEPS TENODESIS Left 04/19/2024    Procedure: ARTHROSCOPY,SHOULDER,WITH BICEPS TENODESIS;  Surgeon: GEORGIANA Up MD;  Location: [facility] OR;  Service: Orthopedics;  Laterality: Left;    BACK SURGERY      CARPAL TUNNEL RELEASE Right 05/18/2021    Procedure: RELEASE, CARPAL TUNNEL;  Surgeon: Kaye Solis MD;  Location: [facility] OR;  Service: Orthopedics;  Laterality: Right;    CATARACT EXTRACTION W/  INTRAOCULAR LENS IMPLANT Bilateral     CHOLECYSTECTOMY      COLONOSCOPY N/A 12/17/2019    Normal - Repeat 5yrs    COLONOSCOPY  2007 2007 TA x2, 2011 TA x3, 2014 HP x3, 2019 normal    COLONOSCOPY N/A 2/24/2025    Procedure: COLONOSCOPY;  Surgeon: Durga Harvey MD;  Location: [facility] (4TH FLR);  Service: Endoscopy;  Laterality: N/A;  11/26 ref by GARO Quick, Suflave, instructions handed to pt in ofc and portal.  niranjan  Jm/pt wife Valarie Rutherford rescheduled to an earlier time/ prep inst given in office / Changed prep to miralax /referral JACK barlow  Pt rescheduled to due to new ins/pt moved procedure to      COSMETIC SURGERY  02/10/2015    Direct mid-forehead brow lift    COSMETIC SURGERY  02/10/2015    Bilateral upper lid blepahroplasty    CYSTOSCOPY WITH URETEROSCOPY, RETROGRADE PYELOGRAPHY, AND INSERTION OF STENT Left 08/19/2020    Procedure: CYSTOSCOPY, WITH RETROGRADE PYELOGRAM AND URETERAL STENT INSERTION;  Surgeon: Katelynn George MD;  Location: [facility] OR;  Service: Urology;  Laterality: Left;    EPIDURAL STEROID INJECTION INTO LUMBAR SPINE Bilateral 3/10/2025    Procedure: Bilateral L2-3 TFESI;  Surgeon: Herman Palomino DO;  Location: [facility];  Service: Pain Management;  Laterality: Bilateral;  no sed-no ac    ESOPHAGOGASTRODUODENOSCOPY N/A 12/17/2019    Procedure: ESOPHAGOGASTRODUODENOSCOPY (EGD);  Surgeon: Amadou Hardin MD;  Location: [facility] (4TH FLR);  Service: Endoscopy;  Laterality: N/A;    ESOPHAGOGASTRODUODENOSCOPY N/A 2/24/2025    Procedure: EGD (ESOPHAGOGASTRODUODENOSCOPY);  Surgeon: Durga Harvey MD;  Location: [facility] (4TH FLR);  Service: Endoscopy;  Laterality: N/A;    EYE SURGERY      FUSION OF LUMBAR SPINE BY ANTERIOR APPROACH N/A 08/20/2020    Procedure: FUSION, SPINE, LUMBAR, ANTERIOR APPROACH L5-S1 ALIF Stand Alone;  Surgeon: Jason Caldwell MD;  Location: [facility] OR;  Service: Neurosurgery;  Laterality: N/A;    HEMORRHOID SURGERY      with complication of chronic bleeding for 6 weeks     INJECTION OF STEROID Right 12/06/2018    Procedure: INJECTION, STEROID Right SI Joint Block and Steroid Injection;  Surgeon: Jason Caldwell MD;  Location: [facility] OR;  Service: Neurosurgery;  Laterality: Right;  Procedure: Right SI Joint Block and Steroid Injection  Surgery Time: 30 MIN  LOS: 0  Anesthesia: MAC  Radiology: C-arm  Bed: [place] [id number] Poster  Position: Prone    INJECTION OF STEROID Right  09/19/2019    Procedure: INJECTION, STEROID Procedure: Right SI joint block nd steroid injection;  Surgeon: Jason Caldwell MD;  Location: [facility] OR;  Service: Neurosurgery;  Laterality: Right;  Procedure: Right SI joint block nd steroid injection  Surgery Time: 30 mins  LOS:   Anesthesia: General MAC  Radiology:C-arm  Bed: Regular Bed  Position: Prone    INJECTION, SACROILIAC JOINT Bilateral 6/24/2025    Procedure: b/l SIJ inj;  Surgeon: Herman Palomino DO;  [facility]: [facility];  Service: Pain Management;  Laterality: Bilateral;  - RN sed - no AC    IRRIGATION AND DEBRIDEMENT OF UPPER EXTREMITY Left 04/07/2022    Procedure: IRRIGATION AND DEBRIDEMENT, UPPER EXTREMITY;  Surgeon: Kaye Solis MD;  Location: [facility] OR;  Service: Orthopedics;  Laterality: Left;    JOINT REPLACEMENT      KNEE SURGERY      involving arthroscopic surgery to both knees    REPAIR OF EXTENSOR TENDON Left 04/07/2022    Procedure: REPAIR, TENDON, EXTENSOR thumb, EPB and EPL;  Surgeon: Kaye Solis MD;  Location: [facility] OR;  Service: Orthopedics;  Laterality: Left;    SHOULDER SURGERY      SINUS SURGERY      left molar and sinus surgery for trigeminal neuralgia    SPINAL FUSION  06/22/2015    L3-L5 XLIF/TANA    TOTAL HIP ARTHROPLASTY  04/2012    Pt states he had total hip replacement on his left hip.    ULNAR NERVE TRANSPOSITION Left 12/16/2020    Procedure: TRANSPOSITION, NERVE, ULNAR - left carpal and cubital tunnel releases;  Surgeon: Addi Bauer MD;  Location: [facility];  Service: Orthopedics;  Laterality: Left;      Problem List[1]     Objective:      Physical Exam  Constitutional:       General: He is not in acute distress.     Appearance: He is well-developed.   HENT:      Head: Normocephalic and atraumatic.      Right Ear: Tympanic membrane, ear canal and external ear normal.      Left Ear: Tympanic membrane, ear canal and external ear normal.      Mouth/Throat:      Pharynx: No oropharyngeal exudate or  posterior oropharyngeal erythema.   Eyes:      General: No scleral icterus.     Conjunctiva/sclera: Conjunctivae normal.      Pupils: Pupils are equal, round, and reactive to light.   Neck:      Thyroid: No thyromegaly.      Comments: No supraclavicular nodes palpated  Cardiovascular:      Rate and Rhythm: Normal rate and regular rhythm.      Pulses: Normal pulses.      Heart sounds: Normal heart sounds. No murmur heard.  Pulmonary:      Effort: Pulmonary effort is normal.      Breath sounds: Normal breath sounds. No wheezing.   Abdominal:      General: Bowel sounds are normal.      Palpations: Abdomen is soft. There is no mass.      Tenderness: There is no abdominal tenderness.      Hernia: A hernia (right groin) is present.   Musculoskeletal:         General: No tenderness.      Cervical back: Normal range of motion and neck supple.      Right lower leg: No edema.      Left lower leg: No edema.   Lymphadenopathy:      Cervical: No cervical adenopathy.   Skin:     Coloration: Skin is not jaundiced or pale.   Neurological:      General: No focal deficit present.      Mental Status: He is alert and oriented to person, place, and time.      Sensory: Sensory deficit present.      Motor: Weakness present.      Coordination: Coordination abnormal.      Gait: Gait abnormal.   Psychiatric:         Mood and Affect: Mood normal.         Behavior: Behavior normal.         Assessment:       Problem List Items Addressed This Visit          Neuro    Spondylosis without myelopathy    Relevant Medications    pregabalin (LYRICA) 75 MG capsule    pregabalin (LYRICA) 25 MG capsule    Other Relevant Orders    CBC Auto Differential    Iron and TIBC    Ferritin    Comprehensive Metabolic Panel    Hemoglobin A1C    TSH    Prostate Specific Antigen, Diagnostic    Idiopathic peripheral neuropathy    Relevant Medications    pregabalin (LYRICA) 75 MG capsule    pregabalin (LYRICA) 25 MG capsule    Other Relevant Orders    CBC Auto  Differential    Iron and TIBC    Ferritin    Comprehensive Metabolic Panel    Hemoglobin A1C    TSH    Prostate Specific Antigen, Diagnostic    Chronic migraine without aura without status migrainosus, not intractable    Relevant Orders    CBC Auto Differential    Iron and TIBC    Ferritin    Comprehensive Metabolic Panel    Hemoglobin A1C    TSH    Prostate Specific Antigen, Diagnostic       Oncology    Prostate cancer    Relevant Orders    CBC Auto Differential    Iron and TIBC    Ferritin    Comprehensive Metabolic Panel    Hemoglobin A1C    TSH    Prostate Specific Antigen, Diagnostic    CT Chest Without Contrast    CT Abdomen Pelvis With IV Contrast Routine Oral Contrast       GI    GERD (gastroesophageal reflux disease)    Relevant Orders    CBC Auto Differential    Iron and TIBC    Ferritin    Comprehensive Metabolic Panel    Hemoglobin A1C    TSH    Prostate Specific Antigen, Diagnostic     Other Visit Diagnoses         Routine physical examination    -  Primary    Relevant Orders    CBC Auto Differential    Iron and TIBC    Ferritin    Comprehensive Metabolic Panel    Hemoglobin A1C    TSH    Prostate Specific Antigen, Diagnostic      Right groin pain        Relevant Orders    CBC Auto Differential    Iron and TIBC    Ferritin    Comprehensive Metabolic Panel    Hemoglobin A1C    TSH    Prostate Specific Antigen, Diagnostic      Right inguinal hernia        Relevant Orders    Ambulatory referral/consult to General Surgery    CBC Auto Differential    Iron and TIBC    Ferritin    Comprehensive Metabolic Panel    Hemoglobin A1C    TSH    Prostate Specific Antigen, Diagnostic      CMT (Charcot-Selena-Tooth disease)        Relevant Medications    pregabalin (LYRICA) 75 MG capsule    pregabalin (LYRICA) 25 MG capsule    Other Relevant Orders    CBC Auto Differential    Iron and TIBC    Ferritin    Comprehensive Metabolic Panel    Hemoglobin A1C    TSH    Prostate Specific Antigen, Diagnostic      Elevated glucose  level        Relevant Orders    Hemoglobin A1C      Weight loss        7-8 pounds in a few months.    Relevant Orders    CT Chest Without Contrast    CT Abdomen Pelvis With IV Contrast Routine Oral Contrast      Chronic cough        Relevant Orders    CT Chest Without Contrast    CT Abdomen Pelvis With IV Contrast Routine Oral Contrast      Epigastric pain        Relevant Orders    CT Chest Without Contrast    CT Abdomen Pelvis With IV Contrast Routine Oral Contrast            Plan:         Raffy was seen today for annual exam.    Diagnoses and all orders for this visit:    Routine physical examination  -     CBC Auto Differential; Future  -     Iron and TIBC; Future  -     Ferritin; Future  -     Comprehensive Metabolic Panel; Future  -     Hemoglobin A1C; Future  -     TSH; Future  -     Prostate Specific Antigen, Diagnostic; Future  -     Cancel: TSH; Future    Right groin pain  -     CBC Auto Differential; Future  -     Iron and TIBC; Future  -     Ferritin; Future  -     Comprehensive Metabolic Panel; Future  -     Hemoglobin A1C; Future  -     TSH; Future  -     Prostate Specific Antigen, Diagnostic; Future  -     Cancel: TSH; Future    Right inguinal hernia  -     Ambulatory referral/consult to General Surgery; Future  -     CBC Auto Differential; Future  -     Iron and TIBC; Future  -     Ferritin; Future  -     Comprehensive Metabolic Panel; Future  -     Hemoglobin A1C; Future  -     TSH; Future  -     Prostate Specific Antigen, Diagnostic; Future  -     Cancel: TSH; Future    Idiopathic peripheral neuropathy  -     pregabalin (LYRICA) 75 MG capsule; Take 3 capsules (225 mg total) by mouth daily at night  -     pregabalin (LYRICA) 25 MG capsule; Take 1 capsule (25 mg total) by mouth 2 (two) times daily.  -     CBC Auto Differential; Future  -     Iron and TIBC; Future  -     Ferritin; Future  -     Comprehensive Metabolic Panel; Future  -     Hemoglobin A1C; Future  -     TSH; Future  -     Prostate  Specific Antigen, Diagnostic; Future  -     Cancel: TSH; Future    CMT (Charcot-Selena-Tooth disease)  -     pregabalin (LYRICA) 75 MG capsule; Take 3 capsules (225 mg total) by mouth daily at night  -     pregabalin (LYRICA) 25 MG capsule; Take 1 capsule (25 mg total) by mouth 2 (two) times daily.  -     CBC Auto Differential; Future  -     Iron and TIBC; Future  -     Ferritin; Future  -     Comprehensive Metabolic Panel; Future  -     Hemoglobin A1C; Future  -     TSH; Future  -     Prostate Specific Antigen, Diagnostic; Future  -     Cancel: TSH; Future    Spondylosis without myelopathy  -     pregabalin (LYRICA) 75 MG capsule; Take 3 capsules (225 mg total) by mouth daily at night  -     pregabalin (LYRICA) 25 MG capsule; Take 1 capsule (25 mg total) by mouth 2 (two) times daily.  -     CBC Auto Differential; Future  -     Iron and TIBC; Future  -     Ferritin; Future  -     Comprehensive Metabolic Panel; Future  -     Hemoglobin A1C; Future  -     TSH; Future  -     Prostate Specific Antigen, Diagnostic; Future  -     Cancel: TSH; Future    Chronic migraine without aura without status migrainosus, not intractable  -     CBC Auto Differential; Future  -     Iron and TIBC; Future  -     Ferritin; Future  -     Comprehensive Metabolic Panel; Future  -     Hemoglobin A1C; Future  -     TSH; Future  -     Prostate Specific Antigen, Diagnostic; Future  -     Cancel: TSH; Future    Gastroesophageal reflux disease without esophagitis  -     CBC Auto Differential; Future  -     Iron and TIBC; Future  -     Ferritin; Future  -     Comprehensive Metabolic Panel; Future  -     Hemoglobin A1C; Future  -     TSH; Future  -     Prostate Specific Antigen, Diagnostic; Future  -     Cancel: TSH; Future    Prostate cancer  -     CBC Auto Differential; Future  -     Iron and TIBC; Future  -     Ferritin; Future  -     Comprehensive Metabolic Panel; Future  -     Hemoglobin A1C; Future  -     TSH; Future  -     Prostate Specific  "Antigen, Diagnostic; Future  -     Cancel: TSH; Future  -     CT Chest Without Contrast; Future  -     CT Abdomen Pelvis With IV Contrast Routine Oral Contrast; Future    Elevated glucose level  -     Hemoglobin A1C; Future  -     Cancel: TSH; Future    Weight loss  Comments:  7-8 pounds in a few months.  Orders:  -     CT Chest Without Contrast; Future  -     CT Abdomen Pelvis With IV Contrast Routine Oral Contrast; Future    Chronic cough  -     CT Chest Without Contrast; Future  -     CT Abdomen Pelvis With IV Contrast Routine Oral Contrast; Future    Epigastric pain  -     CT Chest Without Contrast; Future  -     CT Abdomen Pelvis With IV Contrast Routine Oral Contrast; Future           Continue medication and continue to see specialty doctors but need evaluation for weight loss, right groin pain and lab follow-up.          Portions of this note may have been created with voice recognition software. Occasional "wrong-word" or "sound-a-like" substitutions may have occurred due to the inherent limitations of voice recognition software. Please, read the note carefully and recognize, using context, where substitutions have occurred.       [1]   Patient Active Problem List  Diagnosis    Depression    Hyperlipidemia    Chronic pain    Adenomatous polyp    GERD (gastroesophageal reflux disease)    Back pain    Sleep apnea    Visual disturbances    Spondylosis without myelopathy    Degeneration of lumbar or lumbosacral intervertebral disc    Spinal stenosis, lumbar region, without neurogenic claudication    Thoracic or lumbosacral neuritis or radiculitis, unspecified    Displacement of lumbar intervertebral disc without myelopathy    Acquired spondylolisthesis    Lumbago    Status post cataract extraction and insertion of intraocular lens - Both Eyes    Fibromyalgia    OP (osteoporosis)    Compression fracture of T12 vertebra    Diverticulitis large intestine    Osteoarthritis    Lower back pain    Lower extremity pain "    Muscle weakness    Range of motion deficit    Facet joint disease of cervical region    Occipital neuralgia    Chronic migraine without aura, with intractable migraine, so stated, with status migrainosus    Cervical radiculopathy    Paroxysmal hemicrania    Sciatic nerve pain    Chronic LBP    DJD (degenerative joint disease) of lumbar spine    Chronic neck pain    Right lumbar radiculopathy    Thyroid nodule    Carpal tunnel syndrome of right wrist    Paresthesia    Degenerative disc disease    S/P lumbar spinal fusion    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    Headache around the eyes    Closed fracture of left distal radius    Bilateral foot-drop    Idiopathic peripheral neuropathy    Sacroiliac joint dysfunction of right side    SI (sacroiliac) joint dysfunction    Episodic migraine without status migrainosus, not intractable    Lumbar disc herniation with radiculopathy    Anxiety about health    MDD (major depressive disorder), recurrent episode, moderate    Anxiety    History of colon polyps    Iron deficiency anemia    DDD (degenerative disc disease), lumbosacral    Spondylolisthesis at L5-S1 level    CTS (carpal tunnel syndrome)    Pain in left hand    Right carpal tunnel syndrome    Decreased range of motion of neck    Poor posture    Decreased strength of upper extremity    Prostate cancer    Impaired gait    Balance problems    Genetic disorder    Bilateral carotid artery stenosis    Other specified diseases of spinal cord    Chronic pain of left hand    Pain in thumb joint with movement of left hand    Decreased activities of daily living (ADL)    Aortic atherosclerosis    Chronic migraine without aura without status migrainosus, not intractable    Chronic left shoulder pain    Incomplete tear of left rotator cuff    Primary osteoarthritis, left shoulder    Pain    Decreased range of motion of finger of right hand    Posterior vitreous detachment of both eyes    Pseudophakia of both  eyes    Dry eye syndrome of both eyes    Meibomian gland dysfunction (MGD) of both eyes    Left wrist pain    Neuropathy    Foot drop, bilateral    Low back pain    Chronic left-sided low back pain with left-sided sciatica

## 2025-07-01 ENCOUNTER — TELEPHONE (OUTPATIENT)
Dept: INTERNAL MEDICINE | Facility: CLINIC | Age: 75
End: 2025-07-01
Payer: MEDICARE

## 2025-07-01 ENCOUNTER — CLINICAL SUPPORT (OUTPATIENT)
Dept: REHABILITATION | Facility: HOSPITAL | Age: 75
End: 2025-07-01
Payer: MEDICARE

## 2025-07-01 ENCOUNTER — LAB VISIT (OUTPATIENT)
Dept: LAB | Facility: HOSPITAL | Age: 75
End: 2025-07-01
Attending: INTERNAL MEDICINE
Payer: MEDICARE

## 2025-07-01 DIAGNOSIS — G43.709 CHRONIC MIGRAINE WITHOUT AURA WITHOUT STATUS MIGRAINOSUS, NOT INTRACTABLE: ICD-10-CM

## 2025-07-01 DIAGNOSIS — M47.819 SPONDYLOSIS WITHOUT MYELOPATHY: ICD-10-CM

## 2025-07-01 DIAGNOSIS — M54.42 CHRONIC LEFT-SIDED LOW BACK PAIN WITH LEFT-SIDED SCIATICA: Primary | ICD-10-CM

## 2025-07-01 DIAGNOSIS — C61 PROSTATE CANCER: ICD-10-CM

## 2025-07-01 DIAGNOSIS — R73.09 ELEVATED GLUCOSE LEVEL: ICD-10-CM

## 2025-07-01 DIAGNOSIS — R10.31 RIGHT GROIN PAIN: ICD-10-CM

## 2025-07-01 DIAGNOSIS — K40.90 RIGHT INGUINAL HERNIA: ICD-10-CM

## 2025-07-01 DIAGNOSIS — G60.9 IDIOPATHIC PERIPHERAL NEUROPATHY: ICD-10-CM

## 2025-07-01 DIAGNOSIS — Z00.00 ROUTINE PHYSICAL EXAMINATION: ICD-10-CM

## 2025-07-01 DIAGNOSIS — G89.29 CHRONIC LEFT-SIDED LOW BACK PAIN WITH LEFT-SIDED SCIATICA: Primary | ICD-10-CM

## 2025-07-01 DIAGNOSIS — G60.0 CMT (CHARCOT-MARIE-TOOTH DISEASE): ICD-10-CM

## 2025-07-01 DIAGNOSIS — K21.9 GASTROESOPHAGEAL REFLUX DISEASE WITHOUT ESOPHAGITIS: ICD-10-CM

## 2025-07-01 LAB
ABSOLUTE EOSINOPHIL (OHS): 0.03 K/UL
ABSOLUTE MONOCYTE (OHS): 0.69 K/UL (ref 0.3–1)
ABSOLUTE NEUTROPHIL COUNT (OHS): 4.5 K/UL (ref 1.8–7.7)
ALBUMIN SERPL BCP-MCNC: 4.4 G/DL (ref 3.5–5.2)
ALP SERPL-CCNC: 40 UNIT/L (ref 40–150)
ALT SERPL W/O P-5'-P-CCNC: 37 UNIT/L (ref 10–44)
ANION GAP (OHS): 8 MMOL/L (ref 8–16)
AST SERPL-CCNC: 27 UNIT/L (ref 11–45)
BASOPHILS # BLD AUTO: 0.04 K/UL
BASOPHILS NFR BLD AUTO: 0.6 %
BILIRUB SERPL-MCNC: 0.6 MG/DL (ref 0.1–1)
BUN SERPL-MCNC: 17 MG/DL (ref 8–23)
CALCIUM SERPL-MCNC: 9.2 MG/DL (ref 8.7–10.5)
CHLORIDE SERPL-SCNC: 100 MMOL/L (ref 95–110)
CO2 SERPL-SCNC: 30 MMOL/L (ref 23–29)
CREAT SERPL-MCNC: 0.8 MG/DL (ref 0.5–1.4)
EAG (OHS): 105 MG/DL (ref 68–131)
ERYTHROCYTE [DISTWIDTH] IN BLOOD BY AUTOMATED COUNT: 12.4 % (ref 11.5–14.5)
FERRITIN SERPL-MCNC: 64 NG/ML (ref 20–300)
GFR SERPLBLD CREATININE-BSD FMLA CKD-EPI: >60 ML/MIN/1.73/M2
GLUCOSE SERPL-MCNC: 97 MG/DL (ref 70–110)
HBA1C MFR BLD: 5.3 % (ref 4–5.6)
HCT VFR BLD AUTO: 44.2 % (ref 40–54)
HGB BLD-MCNC: 15 GM/DL (ref 14–18)
IMM GRANULOCYTES # BLD AUTO: 0.02 K/UL (ref 0–0.04)
IMM GRANULOCYTES NFR BLD AUTO: 0.3 % (ref 0–0.5)
IRON SATN MFR SERPL: 64 % (ref 20–50)
IRON SERPL-MCNC: 185 UG/DL (ref 45–160)
LYMPHOCYTES # BLD AUTO: 1.99 K/UL (ref 1–4.8)
MCH RBC QN AUTO: 31.4 PG (ref 27–31)
MCHC RBC AUTO-ENTMCNC: 33.9 G/DL (ref 32–36)
MCV RBC AUTO: 93 FL (ref 82–98)
NUCLEATED RBC (/100WBC) (OHS): 0 /100 WBC
PLATELET # BLD AUTO: 279 K/UL (ref 150–450)
PMV BLD AUTO: 9.3 FL (ref 9.2–12.9)
POTASSIUM SERPL-SCNC: 4.5 MMOL/L (ref 3.5–5.1)
PROT SERPL-MCNC: 7.2 GM/DL (ref 6–8.4)
RBC # BLD AUTO: 4.78 M/UL (ref 4.6–6.2)
RELATIVE EOSINOPHIL (OHS): 0.4 %
RELATIVE LYMPHOCYTE (OHS): 27.4 % (ref 18–48)
RELATIVE MONOCYTE (OHS): 9.5 % (ref 4–15)
RELATIVE NEUTROPHIL (OHS): 61.8 % (ref 38–73)
SODIUM SERPL-SCNC: 138 MMOL/L (ref 136–145)
TIBC SERPL-MCNC: 289 UG/DL (ref 250–450)
TRANSFERRIN SERPL-MCNC: 195 MG/DL (ref 200–375)
TSH SERPL-ACNC: 0.59 UIU/ML (ref 0.4–4)
WBC # BLD AUTO: 7.27 K/UL (ref 3.9–12.7)

## 2025-07-01 PROCEDURE — 80053 COMPREHEN METABOLIC PANEL: CPT

## 2025-07-01 PROCEDURE — 83036 HEMOGLOBIN GLYCOSYLATED A1C: CPT

## 2025-07-01 PROCEDURE — 83540 ASSAY OF IRON: CPT

## 2025-07-01 PROCEDURE — 97112 NEUROMUSCULAR REEDUCATION: CPT | Performed by: PHYSICAL THERAPIST

## 2025-07-01 PROCEDURE — 85025 COMPLETE CBC W/AUTO DIFF WBC: CPT

## 2025-07-01 PROCEDURE — 36415 COLL VENOUS BLD VENIPUNCTURE: CPT

## 2025-07-01 PROCEDURE — 84443 ASSAY THYROID STIM HORMONE: CPT

## 2025-07-01 PROCEDURE — 82728 ASSAY OF FERRITIN: CPT

## 2025-07-01 NOTE — TELEPHONE ENCOUNTER
Received faxed from [facility] (formerly [facility]3] regarding AFO for foot drop  Spoke to patient and clarified that patient does request this  Order form on desk to be signed and will fax back

## 2025-07-01 NOTE — PROGRESS NOTES
"  Outpatient Rehab    Physical Therapy Visit    Patient Name: Raffy Rutherford Jr.  MRN: [id number]  YOB: 1950  Encounter Date: 7/1/2025    Therapy Diagnosis:   Encounter Diagnosis   Name Primary?    Chronic left-sided low back pain with left-sided sciatica Yes     Physician: Lisandra Trinh NP    Physician Orders: Eval and Treat  Medical Diagnosis: Foot drop, right foot  Foot drop, left foot  Radiculopathy, site unspecified  Spondylosis without myelopathy or radiculopathy, site unspecified  Spinal stenosis, lumbar region without neurogenic claudication  Other intervertebral disc displacement, lumbar region  Surgical Diagnosis: Not applicable for this Episode   Surgical Date: Not applicable for this Episode  Days Since Last Surgery: Not applicable for this Episode    Visit # / Visits Authorized:  12 / 52  Insurance Authorization Period: 2/5/2025 to 12/31/2025  Date of Evaluation: 6/3/2025  Plan of Care Certification: 6/3/2025 to 9/3/2025      PT/PTA:     Number of PTA visits since last PT visit:   Time In: 1107   Time Out: 1202  Total Time (in minutes): 55   Total Billable Time (in minutes): 30    FOTO:  Intake Score:  %  Survey Score 2:  %  Survey Score 3:  %    Precautions:       Subjective   Notes back pain worse with driving. He did get some relief with SI joint injections but now he is suffering more with tightness in the lower back.  Pain reported as 6/10.      Objective            Treatment:  Manual Therapy  MT 2: Hip log rolling  MT 3: Lateral glide  R hip  MT 4: Inferior mob hip at 80 deg flexion  Balance/Neuromuscular Re-Education  NMR 1: SAQ 2# 3 x 15  NMR 2: Swiss ball squeeze adductors to mimic playing music 2 x 10 3"  NMR 4: seated SB rollouts 30x  NMR 5: Supine GBT clamsheels 20x 3"  NMR 6: B SL hip abd 3x10/3"  NMR 7: GTB  hip abd bridges 3x10/3"    Time Entry(in minutes):  Manual Therapy Time Entry: 15  Neuromuscular Re-Education Time Entry: 40    Assessment & Plan   Assessment: Notes " relief with hip mobilizations on the R at the start of session. Educated on posture when playing and lumbar roll for stabilization. He will begin loading quad more to address his muscle atrophy.   Evaluation/Treatment Tolerance: Patient tolerated treatment well    The patient will continue to benefit from skilled outpatient physical therapy in order to address the deficits listed in the problem list on the initial evaluation, provide patient and family education, and maximize the patients level of independence in the home and community environments.     The patient's spiritual, cultural, and educational needs were considered, and the patient is agreeable to the plan of care and goals.           Plan: continue with POC - Alt G, strengthening glute to reduce R OA pain    Goals:   Active       Goals       Pt will demonstrate independence with initial HEP to improve their performance of their Activities of Daily Living.    (Progressing)       Start:  06/03/25    Expected End:  07/15/25            Pt will be able to demonstrate lumbar AROM without pain for decreased tissue irritability  (Progressing)       Start:  06/03/25    Expected End:  07/15/25            Patient will improve their manual muscle strength test for glute med to at least a 4/5 to improve their ability to complete their functional activities.   (Progressing)       Start:  06/03/25    Expected End:  07/29/25            Patient will improve their FOTO score to at least a 49% to demonstrate improvements in their ability to complete their functional activities.   (Progressing)       Start:  06/03/25    Expected End:  07/29/25                Corwin Merritt, PT, DPT, OCS

## 2025-07-03 ENCOUNTER — CLINICAL SUPPORT (OUTPATIENT)
Dept: REHABILITATION | Facility: HOSPITAL | Age: 75
End: 2025-07-03
Payer: MEDICARE

## 2025-07-03 DIAGNOSIS — M54.42 CHRONIC LEFT-SIDED LOW BACK PAIN WITH LEFT-SIDED SCIATICA: Primary | ICD-10-CM

## 2025-07-03 DIAGNOSIS — G89.29 CHRONIC LEFT-SIDED LOW BACK PAIN WITH LEFT-SIDED SCIATICA: Primary | ICD-10-CM

## 2025-07-03 PROCEDURE — 97112 NEUROMUSCULAR REEDUCATION: CPT | Performed by: PHYSICAL THERAPIST

## 2025-07-03 NOTE — PROGRESS NOTES
"  Outpatient Rehab    Physical Therapy Visit    Patient Name: Raffy Rutherford Jr.  MRN: [id number]  YOB: 1950  Encounter Date: 7/3/2025    Therapy Diagnosis:   Encounter Diagnosis   Name Primary?    Chronic left-sided low back pain with left-sided sciatica Yes       Physician: Lisandra Trinh NP    Physician Orders: Eval and Treat  Medical Diagnosis: Foot drop, right foot  Foot drop, left foot  Radiculopathy, site unspecified  Spondylosis without myelopathy or radiculopathy, site unspecified  Spinal stenosis, lumbar region without neurogenic claudication  Other intervertebral disc displacement, lumbar region  Surgical Diagnosis: Not applicable for this Episode   Surgical Date: Not applicable for this Episode  Days Since Last Surgery: Not applicable for this Episode    Visit # / Visits Authorized:  13 / 52  Insurance Authorization Period: 2/5/2025 to 12/31/2025  Date of Evaluation: 6/3/2025  Plan of Care Certification: 6/3/2025 to 9/3/2025      PT/PTA:     Number of PTA visits since last PT visit:   Time In: 1000   Time Out: 1100  Total Time (in minutes): 60   Total Billable Time (in minutes): 30    FOTO:  Intake Score:  %  Survey Score 2:  %  Survey Score 3:  %    Precautions:       Subjective   He is doing better since he fixed his posture playing.  Pain reported as 4/10.      Objective            Treatment:  Manual Therapy  MT 1: patellar, fat pad mobilizations  MT 2: Hip log rolling  MT 3: Lateral glide  R hip  MT 4: Inferior mob hip at 80 deg flexion  Balance/Neuromuscular Re-Education  NMR 1: SAQ 3# 3 x 15  NMR 2: Swiss ball squeeze adductors to mimic playing music 2 x 10 3"  NMR 3: Post tilt with 2# flexion 2x  10  NMR 4: Standing hip abd GTB 20x B  NMR 5: Side lying GBT clamsheels 20x 3"  NMR 6: Hip hinge squat 2 x 15  NMR 7: TKE BTB 30x B    Time Entry(in minutes):  Manual Therapy Time Entry: 15  Neuromuscular Re-Education Time Entry: 45    Assessment & Plan   Assessment: Progressed with " loading to quad as tolerated in shortened hip flexion position. He did well with CKC loading today, will progress as tolerated  Evaluation/Treatment Tolerance: Patient tolerated treatment well    The patient will continue to benefit from skilled outpatient physical therapy in order to address the deficits listed in the problem list on the initial evaluation, provide patient and family education, and maximize the patients level of independence in the home and community environments.     The patient's spiritual, cultural, and educational needs were considered, and the patient is agreeable to the plan of care and goals.           Plan: continue with POC - Alt G, strengthening glute to reduce R OA pain    Goals:   Active       Goals       Pt will demonstrate independence with initial HEP to improve their performance of their Activities of Daily Living.    (Progressing)       Start:  06/03/25    Expected End:  07/15/25            Pt will be able to demonstrate lumbar AROM without pain for decreased tissue irritability  (Progressing)       Start:  06/03/25    Expected End:  07/15/25            Patient will improve their manual muscle strength test for glute med to at least a 4/5 to improve their ability to complete their functional activities.   (Progressing)       Start:  06/03/25    Expected End:  07/29/25            Patient will improve their FOTO score to at least a 49% to demonstrate improvements in their ability to complete their functional activities.   (Progressing)       Start:  06/03/25    Expected End:  07/29/25                Corwin Merritt, PT, DPT, OCS

## 2025-07-07 ENCOUNTER — CLINICAL SUPPORT (OUTPATIENT)
Dept: REHABILITATION | Facility: HOSPITAL | Age: 75
End: 2025-07-07
Payer: MEDICARE

## 2025-07-07 DIAGNOSIS — G89.29 CHRONIC LEFT-SIDED LOW BACK PAIN WITH LEFT-SIDED SCIATICA: Primary | ICD-10-CM

## 2025-07-07 DIAGNOSIS — M54.42 CHRONIC LEFT-SIDED LOW BACK PAIN WITH LEFT-SIDED SCIATICA: Primary | ICD-10-CM

## 2025-07-07 PROCEDURE — 97112 NEUROMUSCULAR REEDUCATION: CPT | Performed by: PHYSICAL THERAPIST

## 2025-07-07 NOTE — PROGRESS NOTES
Outpatient Rehab    Physical Therapy Progress Note    Patient Name: Raffy Rutherford Jr.  MRN: [id number]  YOB: 1950  Encounter Date: 7/7/2025    Therapy Diagnosis:   Encounter Diagnosis   Name Primary?    Chronic left-sided low back pain with left-sided sciatica Yes     Physician: Lisandra Trinh NP    Physician Orders: Eval and Treat  Medical Diagnosis: Foot drop, right foot  Foot drop, left foot  Radiculopathy, site unspecified  Spondylosis without myelopathy or radiculopathy, site unspecified  Spinal stenosis, lumbar region without neurogenic claudication  Other intervertebral disc displacement, lumbar region  Surgical Diagnosis: Not applicable for this Episode   Surgical Date: Not applicable for this Episode  Days Since Last Surgery: Not applicable for this Episode    Visit # / Visits Authorized:  14 / 52  Insurance Authorization Period: 2/5/2025 to 12/31/2025  Date of Evaluation: 6/3/2025  Plan of Care Certification: 6/3/2025 to 9/3/2025      PT/PTA:     Number of PTA visits since last PT visit:   Time In: 1000   Time Out: 1100  Total Time (in minutes): 60   Total Billable Time (in minutes): 30    FOTO:  Intake Score:  %  Survey Score 2:  %  Survey Score 3:  %    Precautions:       Subjective   He has some pain with ride side bending but he is doing better with playing instrument not getting up with increased pain.  Pain reported as 3/10.      Objective            Treatment:  Manual Therapy  MT 1: patellar, fat pad mobilizations  MT 2: Hip log rolling  MT 3: Lateral glide  R hip  MT 4: Inferior mob hip at 80 deg flexion  Balance/Neuromuscular Re-Education  NMR 1: LAQ 3# 3 x 15  NMR 2: Post tilt with BKFO 30x  NMR 3: Side lying hip abd post circles 3 x 8  NMR 4: Swiss ball KTB 30x    Time Entry(in minutes):  Manual Therapy Time Entry: 25  Neuromuscular Re-Education Time Entry: 35    Assessment & Plan   Assessment: Frustrated with his braces, noted he was taking increased step length on the R due  to feeling like his brace would slip off  Evaluation/Treatment Tolerance: Patient tolerated treatment well    The patient will continue to benefit from skilled outpatient physical therapy in order to address the deficits listed in the problem list on the initial evaluation, provide patient and family education, and maximize the patients level of independence in the home and community environments.     The patient's spiritual, cultural, and educational needs were considered, and the patient is agreeable to the plan of care and goals.           Plan: continue with POC - Alt G, strengthening glute to reduce R OA pain    Goals:   Active       Goals       Pt will demonstrate independence with initial HEP to improve their performance of their Activities of Daily Living.    (Progressing)       Start:  06/03/25    Expected End:  07/15/25            Pt will be able to demonstrate lumbar AROM without pain for decreased tissue irritability  (Progressing)       Start:  06/03/25    Expected End:  07/15/25            Patient will improve their manual muscle strength test for glute med to at least a 4/5 to improve their ability to complete their functional activities.   (Progressing)       Start:  06/03/25    Expected End:  07/29/25            Patient will improve their FOTO score to at least a 49% to demonstrate improvements in their ability to complete their functional activities.   (Progressing)       Start:  06/03/25    Expected End:  07/29/25                Corwin Merritt, PT, DPT

## 2025-07-08 ENCOUNTER — TELEPHONE (OUTPATIENT)
Dept: INTERNAL MEDICINE | Facility: CLINIC | Age: 75
End: 2025-07-08
Payer: MEDICARE

## 2025-07-08 ENCOUNTER — HOSPITAL ENCOUNTER (OUTPATIENT)
Dept: RADIOLOGY | Facility: OTHER | Age: 75
Discharge: HOME | End: 2025-07-08
Attending: INTERNAL MEDICINE
Payer: MEDICARE

## 2025-07-08 DIAGNOSIS — R05.3 CHRONIC COUGH: ICD-10-CM

## 2025-07-08 DIAGNOSIS — C61 PROSTATE CANCER: ICD-10-CM

## 2025-07-08 DIAGNOSIS — R10.13 EPIGASTRIC PAIN: ICD-10-CM

## 2025-07-08 DIAGNOSIS — R63.4 WEIGHT LOSS: ICD-10-CM

## 2025-07-08 PROCEDURE — A9698 NON-RAD CONTRAST MATERIALNOC: HCPCS | Performed by: INTERNAL MEDICINE

## 2025-07-08 PROCEDURE — 71250 CT THORAX DX C-: CPT | Mod: TC

## 2025-07-08 PROCEDURE — 74177 CT ABD & PELVIS W/CONTRAST: CPT | Mod: 26,,, | Performed by: RADIOLOGY

## 2025-07-08 PROCEDURE — 74177 CT ABD & PELVIS W/CONTRAST: CPT | Mod: TC

## 2025-07-08 PROCEDURE — 25500020 PHARM REV CODE 255: Performed by: INTERNAL MEDICINE

## 2025-07-08 PROCEDURE — 71250 CT THORAX DX C-: CPT | Mod: 26,,, | Performed by: RADIOLOGY

## 2025-07-08 RX ADMIN — IOHEXOL 75 ML: 350 INJECTION, SOLUTION INTRAVENOUS at 04:07

## 2025-07-08 RX ADMIN — IOHEXOL 1000 ML: 9 SOLUTION ORAL at 04:07

## 2025-07-08 NOTE — TELEPHONE ENCOUNTER
Copied from CRM #[id number]. Topic: General Inquiry - Patient Advice  >> Jul 7, 2025 11:44 AM Khang wrote:  .[email or web address]     Patient is calling for Medical Advice regarding: Juanita ([facility]) is calling on behalf of pt stating they needs his last progress notes due to a certain bilatal matter and needs someone to give a call as soon as possible     Patient wants a call back or thru [facility], provide patient's call back phone number:Call back Juanita ([facility]) [phone]    Comments:    Please advise patient replies from provider may take up to 48 hours.

## 2025-07-09 ENCOUNTER — TELEPHONE (OUTPATIENT)
Dept: PAIN MEDICINE | Facility: CLINIC | Age: 75
End: 2025-07-09
Payer: MEDICARE

## 2025-07-09 ENCOUNTER — OFFICE VISIT (OUTPATIENT)
Dept: PAIN MEDICINE | Facility: CLINIC | Age: 75
End: 2025-07-09
Payer: MEDICARE

## 2025-07-09 VITALS
SYSTOLIC BLOOD PRESSURE: 104 MMHG | DIASTOLIC BLOOD PRESSURE: 67 MMHG | WEIGHT: 171.31 LBS | HEIGHT: 68 IN | HEART RATE: 53 BPM | BODY MASS INDEX: 25.96 KG/M2

## 2025-07-09 DIAGNOSIS — G60.0 CHARCOT-MARIE-TOOTH ATROPHY: ICD-10-CM

## 2025-07-09 DIAGNOSIS — M21.371 BILATERAL FOOT-DROP: ICD-10-CM

## 2025-07-09 DIAGNOSIS — M48.061 SPINAL STENOSIS, LUMBAR REGION, WITHOUT NEUROGENIC CLAUDICATION: ICD-10-CM

## 2025-07-09 DIAGNOSIS — M54.16 LUMBAR RADICULOPATHY: ICD-10-CM

## 2025-07-09 DIAGNOSIS — F33.9 EPISODE OF RECURRENT MAJOR DEPRESSIVE DISORDER, UNSPECIFIED DEPRESSION EPISODE SEVERITY: ICD-10-CM

## 2025-07-09 DIAGNOSIS — M25.551 RIGHT HIP PAIN: Primary | ICD-10-CM

## 2025-07-09 DIAGNOSIS — G60.9 IDIOPATHIC PERIPHERAL NEUROPATHY: ICD-10-CM

## 2025-07-09 DIAGNOSIS — M21.372 BILATERAL FOOT-DROP: ICD-10-CM

## 2025-07-09 DIAGNOSIS — M46.1 BILATERAL SACROILIITIS: ICD-10-CM

## 2025-07-09 DIAGNOSIS — G43.709 CHRONIC MIGRAINE WITHOUT AURA WITHOUT STATUS MIGRAINOSUS, NOT INTRACTABLE: ICD-10-CM

## 2025-07-09 DIAGNOSIS — M96.1 POST LAMINECTOMY SYNDROME: ICD-10-CM

## 2025-07-09 PROCEDURE — 99999 PR PBB SHADOW E&M-EST. PATIENT-LVL II: CPT | Mod: PBBFAC,,, | Performed by: STUDENT IN AN ORGANIZED HEALTH CARE EDUCATION/TRAINING PROGRAM

## 2025-07-09 NOTE — PROCEDURES
Procedures  PROCEDURE: Quenza (Capsaicin 8% topical system) - bilateral feet, mostly dorsal foot and anterior snow    PATIENT NAME: Raffy Rutherford Jr.   MRN: [id number]     DATE OF PROCEDURE: 07/09/2025    Diagnosis: peripheral neuropathy    Patch # 1 this year.    Postprocedural Diagnosis: Same    Complications: None  Outcome: Good    Informed Consent:  The patient's condition and proposed procedures, risks (including complications of nerve damage, skin irritation, infection, and failure of pain relief), and alternatives were discussed with the patient or responsible party.  The patient's/responsible party's questions were answered.  The patient/responsible party appeared to understand and chose to proceed.  Informed consent was obtained.    Procedural Pause:  A procedural pause verifying correct patient, medical record number, allergies, medications to be administered, current vital signs, and surgical site was performed immediately prior to beginning the procedure.    Procedure in Detail:  The procedure was performed in the procedure treatment room.  After obtaining written consent, the patient was placed in a supine position. 4 patches were used for the procedure today.  The patient applied 4% lidocaine ointment to the area 1 hour prior to treatment., and the target area was outlined with a marker.  The patch(es) were applied to the target area and secured with tape.  A coban was used to further secure the patches in place.  The patient was allowed to rest in a comfortable position, and monitored by staff every few minutes to ensure comfort.  The option for ice pack was provided to the patient should they start experiencing burning.  The patch(es) remained in place for 60 minutes.  The patch(es) were then removed and the cleaning gel was applied and allowed to sit for an additional 60 seconds, after which the area was wiped clean.     The patient was then discharged in stable condition.      DISCUSSION:  A Qutenza  patch was applied today with the purpose of treating the patients nerve pain. They were informed that they may have some burning of the skin for a few days, and should not take a hot shower for 2-3 days as that may irritate the area.  They were informed that the area may feel like they had a sunburn.  Pain medications can continue to be taken as they were prior to the procedure.  They were informed that it can take about 2-4 weeks for the effects of the patch to be fully realized.      Note Electronically Signed By:  Herman Sung  07/09/2025

## 2025-07-09 NOTE — PROGRESS NOTES
Chronic Pain - New Consult    Referring Physician: Herman Palomino,*    Date: 07/09/2025     Re: Raffy Rutherford Jr.  MR#: [id number]  YOB: 1950  Age: 75 y.o.    Chief Complaint: back and leg pain  No chief complaint on file.    **This note is dictated using the [employer]*Modal Fluency Direct word recognition program. There are word recognition mistakes that are occasionally missed on review. This note was generated with the assistance of ambient listening technology. Verbal consent was obtained by the patient and accompanying visitor(s) for the recording of patient appointment to facilitate this note. I attest to having reviewed and edited the generated note for accuracy, though some syntax or spelling errors may persist. Please contact the author of this note for any clarification.   **    ASSESSMENT: 75 y.o. year old male with back, foot, leg, hand pain, consistent with     1. Right hip pain  Procedure Request Order for Pain Management      2. Idiopathic peripheral neuropathy  capsaicin-skin cleanser patch 4 patch      3. Bilateral sacroiliitis        4. Post laminectomy syndrome        5. Spinal stenosis, lumbar region, without neurogenic claudication        6. Bilateral foot-drop        7. Chronic migraine without aura without status migrainosus, not intractable        8. Charcot-Selena-Tooth atrophy        9. Lumbar radiculopathy        10. Episode of recurrent major depressive disorder, unspecified depression episode severity          **Assessment & Plan    M21.371 Foot drop, right foot  M21.372 Foot drop, left foot  M47.812 Spondylosis without myelopathy or radiculopathy, cervical region  G89.29 Other chronic pain  R26.89 Other abnormalities of gait and mobility  M62.561 Muscle wasting and atrophy, not elsewhere classified, right lower leg  M62.562 Muscle wasting and atrophy, not elsewhere classified, left lower leg  M48.07 Spinal stenosis, lumbosacral region  M50.10 Cervical disc disorder with  radiculopathy, unspecified cervical region  M16.11 Unilateral primary osteoarthritis, right hip  M46.1 Sacroiliitis, not elsewhere classified  Z96.642 Presence of left artificial hip joint  Z99.89 Dependence on other enabling machines and devices  Z79.891 Long term (current) use of opiate analgesic  G61.81 Chronic inflammatory demyelinating polyneuritis  G97.82 Other postprocedural complications and disorders of nervous system  K42.9 Umbilical hernia without obstruction or gangrene    PLAN:     SACROILIITIS:  6/24/25 - b/l SIJ - RN sed - no AC - try straight AP - 50% improvement for about 4 days. First day was the best.  -discussed SI fusion. Defer this.    CHRONIC INFLAMMATORY DEMYELINATING POLYNEURITIS:  - 7/9/25 - Qutenza 4 patches today. Risks, benefits, and alternatives were discussed with the patient, and He would like to proceed.  -discussed SCS with Nevro    Spinal stenosis with neurogenic claudication and Post-laminectomy syndrome  3/10/25 - DTP Javi - b/l L2-3 TFESI - no sed - no AC - helpful for a few weeks  - Discussed Nevro SCS as treatment.  He wants to try the qutenza and hip injection first.  - Explained trial process and potential benefits, including significant improvement in neuropathy symptoms and possible reversal.    UNILATERAL PRIMARY OSTEOARTHRITIS, RIGHT HIP:  7/17/25 - Right hip injection - no sed - no ac - needs to be the first procedure since he has a plane to catch that morning.    - RTC 1 month after qutenza  - Counseled patient regarding the importance of activity modification and physical therapy.    The above plan and management options were discussed at length with patient. Patient is in agreement with the above and verbalized understanding. It will be communicated with the referring physician via electronic record, fax, or mail.  Lab/study reports reviewed were important and necessary because subsequent medical and treatment recommendations required review of the above lab/study  reports. Images viewed/reviewed above were important and necessary because subsequent medical and treatment recommendations required review of the reviewed image(s).     Electronically signed by:  Herman Palomino DO  07/09/2025    Patient was seen in clinic today.  The total amount of time spent on this patient was exactly 62 minutes.  Due to medical complexity, time spent with the patient, and multiple issues discussed/addressed I am billing for a level 5 visit. This includes face to face time and non-face to face time preparing to see the patient by reviewing previous labs/imaging, obtaining and/or reviewing separately obtained history, documenting clinical information in the electronic or other health record, independently reviewing results and communicating results to the patient/family/caregiver/additional providers.  The available imaging was reviewed in person with the patient, pathology and treatment options were explained in detail with the patient.  The patient was given multiple opportunities to ask questions, and all questions were answered.  =========================================================================================================    SUBJECTIVE:    Interval History 7/9/2025:   Raffy Rutherford Jr. is a 75 y.o. male presents to the clinic for follow up.  Since last visit the pain has is unchanged. He is s/p SI joint injection on 6/24 but did not get significant improvement.    The pain is located in the back and legs area and does not radiate.  The pain is described as aching, burning, sharp, and stabbing    At BEST  4/10   At WORST  9/10 on the WORST day.    On average pain is rated as 6/10.   Today the pain is rated as 5/10  Symptoms interfere with daily activity, sleeping, and work.   Exacerbating factors: Sitting, Standing, and Walking.      Current pain medications: Pregabalin 175mg QHS (drowsy), Hydrocodone 2.5mg TID, urogepant, lamictal 200mg, Selegiline, Celebrex 200mg,  klonipin 0.5mg (for insomnia), methocarbamol rarely  Failed Pain Medications: all of the above    Pain procedures:   Back injections with Dr. Olivarez  3/10/25 - DTP Javi - b/l L2-3 TFESI - no sed - no AC - helpful for a few weeks  4/29/25 - botox for migraines  6/24/25 - b/l SIJ - RN sed - no AC - 50% for a few days    Spinal decompression:   Laminectomy x2  - 2012  Fusion x2 - most recent 2018 L3-S1  Hand surgery x5 - tendon / CTS  Joint replacement: left SHARAD 2012      Initial hx:  Raffy Rutherford Jr. is a 75 y.o. male presents to the clinic for the evaluation of lower back back, right leg pain. The pain started 6 months ago following no inciting event and symptoms have been worsening.  Patient thinks he might have history of charcot-vinny-tooth.  He had an EMG that diagnosed neuropathy.  Worse in the right leg. Recently getting numbness and tingling in the hands. He gets burning from the mid shin and goes to the top of the foot and the bottom of the toes.  Worse while wearing braces. He has foot drop on the right side. He has b/l AFOs. He has a lot of atrophy in the calf muscles. This has been ongoing for a number of years.  He does problems with buttoning shirts and dropping objects. He is currently in PT which has aggravated an umbilical hernia. Patient used to see Dr. Olivarez for pain but no longer on insurance.      Patient presents with severe neuropathic pain in his right leg and recent onset of numbness and tingling in his left leg. Pain is burning, starting from the mid-shin and extending into the foot, primarily affecting the top of the foot. He indicates the mid-shin area as the origin of the pain. He has pronounced foot drop on the right side and slight foot drop on the left, with significant atrophy in his right calf muscle.    His symptoms have been ongoing for several years, following a history of four back surgeries, including laminectomies and two fusions. His most recent fusion was approximately  five years ago, around 2018. He reports recent onset of dropping things from his hands and some handwriting difficulties, but denies buttoning shirt difficulties.    Current medications include hydrocodone 2.5 mg 3 times daily (7.5 mg total per day) and pregabalin 175 mg at night, occasionally adding 25 mg at dinner if neuropathic pain is severe. He previously saw Dr. Olivarez for pain management but changed due to insurance issues. Treatments have included back injections with Dr. Olivarez and Dr. Lehman, providing temporary relief. He recently received a bilateral transforaminal epidural injection a few months ago.    He reports groin pain on the right side, radiating to the hip area. His walking has worsened, especially on the right side, with difficulty controlling leg movement. He also has back pain that radiates into his pelvic area and sometimes to the side. Pain occurs when changing positions, particularly when getting up from certain chairs.    Additional medical history includes a successful left hip replacement 13 years ago. He has had five surgeries in the last few years, including two carpal tunnel surgeries and a cubital tunnel surgery. He is scheduled for a hiking trip in the [facility] in about five weeks.    He does not have central stenosis in the neck and does not have diabetes. He is right-handed.    PREVIOUS TREATMENTS:  Patient has undergone four back surgeries, starting with laminectomies and two fusions, beginning 13 years ago when he was 62 years old. He received an epidural injection which provided minimal benefit, lasting only a few weeks. He also underwent a spinal cord stimulator trial when the devices were relatively new. Patient is currently attending physical therapy, but it has aggravated a long-standing hernia. He is receiving Botox treatments for headaches from neurology. Acupuncture provided slight benefit.    MEDICATIONS:  Patient is on Hydrocodone 2.5 mg, 1 tablet 3 times  daily (total 7.5 mg per day). He takes Pregabalin (Lyrica) 175 mg, 1 tablet at night, with occasional additional 25 mg at dinner time if neuropathic pain is severe. He is currently taking Celebrex but considering stopping. Patient takes half a tablet of Methocarbamol (Robaxin) when his back is really tight. He uses Clonazepam (Klonopin) as needed for insomnia.    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 He recently taken up cello due to back issues making basses too heavy. He has had to stop playing gigs due to health issues. Patient also works as a licensed professional counselor or .     Pain Description:    The pain is located in the lower back area and radiates to the right leg.    At BEST  5/10   At WORST  8/10 on the WORST day.    On average pain is rated as 6/10.   Today the pain is rated as 3/10  The pain is continuous.  The pain is described as burning, numbing, and tingling    Symptoms interfere with daily activity.   Exacerbating factors: Sitting and Standing.    Mitigating factors heat, ice, and topicals.   He reports 7 hours of sleep per night.    Physical Therapy/Home Exercise: Yes, currently in Physical therapy    Current Pain Medications:    - Pregabalin 175mg QHS (drowsy), Hydrocodone 2.5mg TID, urogepant, lamictal 200mg, Selegiline, Celebrex 200mg, klonipin 0.5mg (for insomnia), methocarbamol rarely    Failed Pain Medications:    - all of the above    Pain Treatment Therapies:    Pain procedures:   Back injections with Dr. Olivarez  3/10/25 - ALLISON Caldwell - b/l L2-3 TFESI - no sed - no AC - helpful for a few weeks  4/29/25 - botox for migraines  Physical Therapy: yes  Chiropractor: none  Acupuncture: none  TENS unit: none  Spinal decompression:   Laminectomy x2  - 2012  Fusion x2 - most recent 2018 L3-S1  Hand surgery x5 - tendon / CTS  Joint replacement: left SHARAD 2012    Patient reports significant motor weakness and loss of sensations.  Patient  denies any suicidal or homicidal ideations     report:  Reviewed and consistent with medication use as prescribed.    Imaging:   CERVICAL MRI  Results for orders placed during the hospital encounter of 05/25/25    MRI Cervical Spine Without Contrast    Narrative  EXAMINATION:  MRI CERVICAL SPINE WITHOUT CONTRAST    CLINICAL HISTORY:  Neck pain, chronic, degenerative changes on xray; Anesthesia of skin    TECHNIQUE:  Multiplanar, multisequence MR images of the cervical spine were performed utilizing no contrast.    COMPARISON:  Cervical spine radiographs 05/15/2025, MRI cervical spine 01/04/2021    FINDINGS:  C1-C2: Dens is intact.  Pre dens space is maintained.    Alignment: Minimal grade 1 retrolisthesis of C4 on C5.    Vertebrae: C4, C7, and T2 benign osseous hemangiomas.  Additional hemangioma noted in the right C5 facet.    Discs: Multilevel mild disc desiccation with moderate height loss at C3-C4.  No sub endplate marrow edema/Modic 1 changes.    Cord: Normal.    Skull base and craniocervical junction: Normal.    Degenerative findings:    C2-C3: No spinal canal stenosis or neural foraminal narrowing.    C3-C4: Small posterior disc osteophyte complex and uncovertebral spurring.  Bilateral facet arthropathy.  Mild bilateral neural foraminal narrowing.  No spinal canal stenosis.    C4-C5: Small posterior disc osteophyte complex with uncovertebral spurring.  Bilateral facet arthropathy.  Mild right and moderate left neural foraminal narrowing.  Mild spinal canal stenosis.    C5-C6: Small posterior disc osteophyte complex with uncovertebral spurring.  Bilateral facet arthropathy.  Moderate right and mild left neural foraminal narrowing.  Mild spinal canal stenosis.    C6-C7: Small posterior disc osteophyte complex.  Bilateral small perineural cysts.  Mild bilateral neural foraminal narrowing.  No spinal canal stenosis.    C7-T1: No spinal canal stenosis or neural foraminal narrowing.  Bilateral small perineural  cysts.    Paraspinal muscles & soft tissues: Heterogeneous enlarged left thyroid gland, correlating with prior exams.  Left submandibular gland is not visualized.    Impression  Cervical spondylosis, contributing to mild spinal canal stenosis at C4-5 and C5-6 and mild/ moderate neural foraminal narrowing C3-4 through C6-7, as above.    Electronically signed by resident: Antony Vilchis  Date:    05/25/2025  Time:    15:38    Electronically signed by: Giuseppe Cleary MD  Date:    05/25/2025  Time:    16:12     LUMBAR MRI  Results for orders placed during the hospital encounter of 02/12/25    MRI Lumbar Spine Without Contrast    Narrative  EXAMINATION:  MRI LUMBAR SPINE WITHOUT CONTRAST    CLINICAL HISTORY:  Lumbar radiculopathy, symptoms persist with conservative treatment; Radiculopathy, lumbar region    TECHNIQUE:  Multiplanar, multisequence MR images were acquired from the thoracolumbar junction to the sacrum without contrast.    COMPARISON:  Radiographs 08/16/2023    FINDINGS:  Postoperative changes of instrumented fusion at L3-S1.  Solid fusion noted across the L3-L4 and L4-L5 levels.  L5-S1 fusion not certain.  No abnormal signal about the hardware to suggest loosening.    Alignment: Grade 1 retrolisthesis at L2-L3.    Vertebrae: Chronic T12 vertebral body compression fracture with vertebroplasty cement and mild height loss.  Mild anterior height loss of the L1 vertebral body.  No evidence for acute fracture.  No marrow infiltrative process.  Modic 1 degenerative endplate changes noted at L2-L3.    Discs: Severe disc height loss at L2-L3.  No evidence for discitis.    Cord: Conus terminates at L1 and appears unremarkable.  Cauda equina appears unremarkable.    Degenerative findings:    T12-L1: No spinal canal stenosis or neuroforaminal narrowing.    L1-L2: No spinal canal stenosis or neuroforaminal narrowing.    L2-L3: Circumferential disc bulge and moderate facet arthropathy result in moderate spinal canal  stenosis and mild bilateral neural foraminal narrowing.    L3-L4: Operative level.  No spinal canal stenosis or neural foraminal narrowing.    L4-L5: Operative level.  No spinal canal stenosis or neural foraminal narrowing.    L5-S1: Operative level.  Mild bilateral neural foraminal narrowing, obscured by artifact.    Paraspinal muscles & soft tissues: Moderate paraspinal muscle atrophy.    Impression  1. Postoperative changes of instrumented fusion at L3-S1, detailed above.  2. Degenerative changes primarily at L2-L3 resulting in moderate spinal canal stenosis and mild bilateral neural foraminal narrowing.      Electronically signed by: Ji Monterroso MD  Date:    02/12/2025  Time:    13:27            6/11/2025     8:11 AM 2/8/2017     8:54 AM 11/17/2015    11:23 AM 10/22/2015     2:11 PM 9/15/2015     2:19 PM 3/6/2014     9:46 AM 3/3/2014     9:21 AM   Pain Disability Index (PDI)   Family/Home Responsibilities: 6 5 7 3 6 6 3   Recreation: 6 8 6 7 7 5 10   Occupation: 6 6 8 6 7 8 6   Sexual Behavior: 6 2 5 0 7 0 4   Self Care: 6 2 3 0 5 8 1   Life-Support Activities: 6 0 0 0 0 8 0   Pain Disability Index (PDI) 42 25 35 22 37 41 30        Past Medical History:   Diagnosis Date    Allergy     Arthritis     Back pain     after trauma beginning in 195    Cataract     Chronic pain     neck and left shoulder    Cluster headache 2013    Colon polyps 2007 2007-2019: TA x5, HP x3    Degenerative disc disease     Depression     Diverticulitis 12/2013    Dry eye syndrome     Fibromyalgia 2013    GERD (gastroesophageal reflux disease)     Hepatitis 1970's    A    History of prostate biopsy 2002    Hyperlipidemia     Joint pain     Prostate cancer 07/2021    PVD (posterior vitreous detachment)     Salzmann's nodular dystrophy of left eye     Sleep apnea     Thyroid nodule 07/16/2014    Trigeminal neuralgia of left side of face     Tubular adenoma of colon 2007    5 removed [id number]    Visual disturbance 2012    problems  after cataract surgery     Past Surgical History:   Procedure Laterality Date    ARTHROSCOPIC DEBRIDEMENT OF SHOULDER Left 04/19/2024    Procedure: DEBRIDEMENT, SHOULDER, ARTHROSCOPIC;  Surgeon: GEORGIANA Up MD;  Location: [facility] OR;  Service: Orthopedics;  Laterality: Left;  Regional w/o Catheter, Interscalene, 0.5% Marcaine Plain    ARTHROSCOPIC REPAIR OF ROTATOR CUFF OF SHOULDER Left 04/19/2024    Procedure: REPAIR, ROTATOR CUFF, ARTHROSCOPIC;  Surgeon: GEORGIANA Up MD;  Location: [facility] OR;  Service: Orthopedics;  Laterality: Left;    ARTHROSCOPY OF SHOULDER WITH DECOMPRESSION OF SUBACROMIAL SPACE Left 04/19/2024    Procedure: ARTHROSCOPY, SHOULDER, WITH SUBACROMIAL SPACE DECOMPRESSION;  Surgeon: GEORGIANA Up MD;  Location: [facility] OR;  Service: Orthopedics;  Laterality: Left;    ARTHROSCOPY,SHOULDER,WITH BICEPS TENODESIS Left 04/19/2024    Procedure: ARTHROSCOPY,SHOULDER,WITH BICEPS TENODESIS;  Surgeon: GEORGIANA Up MD;  Location: [facility] OR;  Service: Orthopedics;  Laterality: Left;    BACK SURGERY      CARPAL TUNNEL RELEASE Right 05/18/2021    Procedure: RELEASE, CARPAL TUNNEL;  Surgeon: Kaye Solis MD;  Location: [facility] OR;  Service: Orthopedics;  Laterality: Right;    CATARACT EXTRACTION W/  INTRAOCULAR LENS IMPLANT Bilateral     CHOLECYSTECTOMY      COLONOSCOPY N/A 12/17/2019    Normal - Repeat 5yrs    COLONOSCOPY  2007 2007 TA x2, 2011 TA x3, 2014 HP x3, 2019 normal    COLONOSCOPY N/A 2/24/2025    Procedure: COLONOSCOPY;  Surgeon: Durga Harvey MD;  Location: [facility] ([address] [place] [address]);  Service: Endoscopy;  Laterality: N/A;  11/26 ref by GARO Quick, Suflave, instructions handed to pt in ofc and portal. [email or web address]  Jm/pt wife Valarie Rutherford rescheduled to an earlier time/ prep inst given in office / Changed prep to miralax /referral JACK barlow  Pt rescheduled to due to new ins/pt moved procedure to      COSMETIC SURGERY  02/10/2015    Direct mid-forehead brow lift    COSMETIC SURGERY  02/10/2015     Bilateral upper lid blepahroplasty    CYSTOSCOPY WITH URETEROSCOPY, RETROGRADE PYELOGRAPHY, AND INSERTION OF STENT Left 08/19/2020    Procedure: CYSTOSCOPY, WITH RETROGRADE PYELOGRAM AND URETERAL STENT INSERTION;  Surgeon: Katelynn George MD;  Location: [facility] OR;  Service: Urology;  Laterality: Left;    EPIDURAL STEROID INJECTION INTO LUMBAR SPINE Bilateral 3/10/2025    Procedure: Bilateral L2-3 TFESI;  Surgeon: Herman Palomino DO;  Location: [facility] PAIN MANAGEMENT;  Service: Pain Management;  Laterality: Bilateral;  no sed-no ac    ESOPHAGOGASTRODUODENOSCOPY N/A 12/17/2019    Procedure: ESOPHAGOGASTRODUODENOSCOPY (EGD);  Surgeon: Amadou Hardin MD;  Location: [facility] ENDO (4TH FLR);  Service: Endoscopy;  Laterality: N/A;    ESOPHAGOGASTRODUODENOSCOPY N/A 2/24/2025    Procedure: EGD (ESOPHAGOGASTRODUODENOSCOPY);  Surgeon: Durga Harvey MD;  Location: [facility] ENDO (4TH FLR);  Service: Endoscopy;  Laterality: N/A;    EYE SURGERY      FUSION OF LUMBAR SPINE BY ANTERIOR APPROACH N/A 08/20/2020    Procedure: FUSION, SPINE, LUMBAR, ANTERIOR APPROACH L5-S1 ALIF Stand Alone;  Surgeon: Jason Caldwell MD;  Location: [facility] OR;  Service: Neurosurgery;  Laterality: N/A;    HEMORRHOID SURGERY      with complication of chronic bleeding for 6 weeks     INJECTION OF STEROID Right 12/06/2018    Procedure: INJECTION, STEROID Right SI Joint Block and Steroid Injection;  Surgeon: Jason Caldwell MD;  Location: [facility] OR;  Service: Neurosurgery;  Laterality: Right;  Procedure: Right SI Joint Block and Steroid Injection  Surgery Time: 30 MIN  LOS: 0  Anesthesia: MAC  Radiology: C-arm  Bed: [place] [address] Poster  Position: Prone    INJECTION OF STEROID Right 09/19/2019    Procedure: INJECTION, STEROID Procedure: Right SI joint block nd steroid injection;  Surgeon: Jason Caldwell MD;  Location: [facility];  Service: Neurosurgery;  Laterality: Right;  Procedure: Right SI joint block nd steroid injection  Surgery Time: 30 mins  LOS:   Anesthesia:  General MAC  Radiology:C-arm  Bed: Regular Bed  Position: Prone    INJECTION, SACROILIAC JOINT Bilateral 6/24/2025    Procedure: b/l SIJ inj;  Surgeon: Herman Palomino DO;  Location: [facility] PAIN MANAGEMENT;  Service: Pain Management;  Laterality: Bilateral;  - RN sed - no AC    IRRIGATION AND DEBRIDEMENT OF UPPER EXTREMITY Left 04/07/2022    Procedure: IRRIGATION AND DEBRIDEMENT, UPPER EXTREMITY;  Surgeon: Kaye Solis MD;  Location: [facility] OR;  Service: Orthopedics;  Laterality: Left;    JOINT REPLACEMENT      KNEE SURGERY      involving arthroscopic surgery to both knees    REPAIR OF EXTENSOR TENDON Left 04/07/2022    Procedure: REPAIR, TENDON, EXTENSOR thumb, EPB and EPL;  Surgeon: Kaye Solis MD;  Location: [facility] OR;  Service: Orthopedics;  Laterality: Left;    SHOULDER SURGERY      SINUS SURGERY      left molar and sinus surgery for trigeminal neuralgia    SPINAL FUSION  06/22/2015    L3-L5 XLIF/TANA    TOTAL HIP ARTHROPLASTY  04/2012    Pt states he had total hip replacement on his left hip.    ULNAR NERVE TRANSPOSITION Left 12/16/2020    Procedure: TRANSPOSITION, NERVE, ULNAR - left carpal and cubital tunnel releases;  Surgeon: Addi Bauer MD;  Location: [facility] OR;  Service: Orthopedics;  Laterality: Left;     Social History[1]  Family History   Problem Relation Name Age of Onset    Stroke Mother Honey 86    Colon cancer Mother Honey         early/mid-60s    Uterine cancer Mother Honey 77    Pancreatitis Brother Will         acute, now s/p nathalia    Diabetes Brother Will     Macular degeneration Brother Will     Other Brother Will         foot drop    Other Father Raffy,Sr. 44        pituitary tumor- CA vs benign?    Heart attack Maternal Grandfather mgf         suspected, 50s    Migraines Sister Ida     Crohn's disease Sister Ida         w/ assoc joint issues    Arthritis Sister Ida     Tremor Daughter Rocio     Irritable bowel syndrome Son Rocky         leaning toward Crohn's dx     Neurological Disorders Son Rocky         nonspecific, benign fasciculations of calves    Tremor Son Rocky     Jaundice Grandchild Jocelyne     Other Maternal Uncle Jesse         memory issue    Other Maternal Uncle Real         memory issue    Cancer Maternal Cousin Ira         origin? maybe thyroid? 40s?    Melanoma Neg Hx      Amblyopia Neg Hx      Blindness Neg Hx      Cataracts Neg Hx      Glaucoma Neg Hx      Hypertension Neg Hx      Retinal detachment Neg Hx      Strabismus Neg Hx      Thyroid disease Neg Hx      Allergic rhinitis Neg Hx      Allergies Neg Hx      Angioedema Neg Hx      Asthma Neg Hx      Eczema Neg Hx      Urticaria Neg Hx      Rhinitis Neg Hx      Immunodeficiency Neg Hx      Atopy Neg Hx         Review of patient's allergies indicates:   Allergen Reactions    Alphagan [brimonidine]      Patient taking MASO-B Selective Inhibitor Selegiline (Emsam)    Coumadin [warfarin]      itch    Oxycodone      hiccups       Current Outpatient Medications   Medication Sig    alendronate (FOSAMAX) 70 MG tablet Take 1 tablet (70 mg total) by mouth every 7 days. Take with a full glass of water weekly    atorvastatin (LIPITOR) 40 MG tablet Take 1 tablet (40 mg total) by mouth once daily.    butalbital-acetaminophen-caffeine -40 mg (FIORICET, ESGIC) -40 mg per tablet Take 1 tablet by mouth daily as needed for 30 days.    celecoxib (CELEBREX) 200 MG capsule Take 1 capsule (200 mg total) by mouth 2 (two) times a day.    clindamycin (CLEOCIN) 150 MG capsule Take 4 capsules 1 hour prior to dental appointment    clonazePAM (KLONOPIN) 0.5 MG tablet Take 1 tablet by mouth twice a day as needed for anxiety    cycloSPORINE (VEVYE) 0.1 % Drop Place 1 drop into both eyes 2 (two) times a day.    ergocalciferol (VITAMIN D2) 50,000 unit Cap Take 1 capsule (50,000 Units total) by mouth every 7 days.    fremanezumab-vfrm (AJOVY SYRINGE) 225 mg/1.5 mL injection Inject 1.5 mLs (225 mg total) into the skin every 28  days.    HYDROcodone-acetaminophen (NORCO) 5-325 mg per tablet Take 1 tablet by mouth every 8 (eight) hours as needed for Pain.    hydrocortisone-pramoxine (ANALPRAM-HC) 2.5-1 % Crea Place rectally 3 (three) times daily.    ketoconazole (NIZORAL) 2 % cream Apply to affected areas of body folds twice daily as needed for irritation.    lamoTRIgine (LAMICTAL) 200 MG tablet Take 1 tablet (200 mg total) by mouth once daily.    methocarbamoL (ROBAXIN) 750 MG Tab Take 1 tablet (750 mg total) by mouth 3 (three) times daily.    perfluorohexyloctane, PF, (MIEBO, PF,) 100 % Drop Place 1 drop into both eyes 4 (four) times daily.    pregabalin (LYRICA) 25 MG capsule Take 1 capsule (25 mg total) by mouth 2 (two) times daily.    pregabalin (LYRICA) 75 MG capsule Take 3 capsules (225 mg total) by mouth daily at night    RABEprazole (ACIPHEX) 20 mg tablet Take 1 tablet (20 mg total) by mouth 2 (two) times daily.    selegiline (EMSAM) 12 mg/24 hr Place 1 patch onto the skin once daily.    selegiline (EMSAM) 9 mg/24 hr Place 1 patch onto the skin once daily.    traZODone (DESYREL) 100 MG tablet Take 1 tablet (100 mg total) by mouth every evening.    triamcinolone acetonide 0.025% (KENALOG) 0.025 % cream Apply to affected areas of body folds twice daily as needed for irritation. Do not use for longer than 2 weeks in a row.    ubrogepant (UBRELVY) 100 mg tablet Take 1 tablet (100 mg total) by mouth every 2 (two) hours as needed for Migraine. Max 200 mg/day.    ubrogepant (UBRELVY) 100 mg tablet Take 1 tablet (100 mg total) by mouth every 2 (two) hours as needed for Migraine. Max 200 mg/day.     Current Facility-Administered Medications   Medication    capsaicin-skin cleanser patch 4 patch       REVIEW OF SYSTEMS:    GENERAL:  No weight loss, malaise or fevers.:NO  HEENT:   No recent changes in vision or hearing:NO  NECK:  Negative for lumps, no difficulty with swallowing.:NO  RESPIRATORY:  Negative for cough, wheezing or shortness of  "breath, patient denies any recent URI.:NO  CARDIOVASCULAR:  Negative for chest pain, leg swelling or palpitations.:NO  GI:  Negative for abdominal discomfort, blood in stools or black stools or change in bowel habits.:+abdominal discomfort  MUSCULOSKELETAL:  See HPI.  SKIN:  Negative for lesions, rash, and itching.:NO  PSYCH:  No mood disorder or recent psychosocial stressors.  Patients sleep is not disturbed secondary to pain.:+depression  HEMATOLOGY/LYMPHOLOGY:  Negative for prolonged bleeding, bruising easily or swollen nodes.  Patient is not currently taking any anti-coagulants:NO  NEURO:   No history of headaches, syncope, paralysis, seizures or tremors.+migraines  All other reviewed and negative other than HPI.    OBJECTIVE:    /67 (BP Location: Left arm, Patient Position: Lying)   Pulse (!) 53   Ht 5' 8" (1.727 m)   Wt 77.7 kg (171 lb 4.8 oz)   BMI 26.05 kg/m²     PHYSICAL EXAMINATION:    GENERAL: Well appearing, in no acute distress, alert and oriented x3.  PSYCH:  Mood and affect appropriate.  SKIN: Skin color, texture, turgor normal, no rashes or lesions.  HEAD/FACE:  Normocephalic, atraumatic. Cranial nerves grossly intact.    NECK:   - Did not perform pain to palpation over the cervical paraspinous muscles.   - Spurling  Did not perform.  - Did not perform pain with neck flexion, extension, or lateral flexion.     CV: RRR with palpation of the radial artery.  PULM: CTAB. No evidence of respiratory difficulty, symmetric chest rise.  GI:  Soft and non-tender.    BACK:   - back incisions well healed  - Negative spinous process tenderness  - Positive paravertebral tenderness  - Positive pain to palpation over the facet joints of the lumbar spine.   - Positive QL / Iliac crest / Glut tenderness  - Slump test is Negative for radicular pain  - Slump test is Negative for back pain  - Supine Straight leg raising is Negative for radicular pain  - Supine Straight leg raising is Negative for back pain  - " Did not perform Sustained Hip Flexion test (for discogenic pain)  - Positive Altered Gait, Posture  - Axial facet loading test Positive on the bilateral side(s)    SI Joint exam:  - Positive SI joint tenderness to palpation  - Brent's sign Positive  - Yeoman's Test: Did not perform for SI joint pain indicating anterior SI ligament involvement. Did not perform for anterior thigh pain/paresthesia which indicates femoral nerve stretch.  - Gaenslen's Test:Positive  - Finger Blake's Sign:Positive  - SI compression test:Positive  - SI distraction test:Negative  - Thigh Thrust: Positive  - SI Thrust: Did not perform    MUSKULOSKELETAL:    EXTREMITIES:   Hip Exam:  - Log Roll Negative  - FADIR Negative  - Stinchfield Negative  - Hip Scour Positive  - GTB Tenderness Negative    MSK:  Significant atrophy of the musculature in his calves    NEUROLOGICAL EXAM:  MENTAL STATUS: A x O x 3, good concentration, speech is fluent and goal directed  MEMORY: recent and remote are intact  CN: CN2-12 grossly intact  MOTOR: 5/5 in all muscle groups except DF/PF  DTRs: 1+ intact symmetric  Sensation:    -yes Loss of sensation in a left lower and right lower feet and leg distribution.    GAIT: with AFOs.         [1]   Social History  Socioeconomic History    Marital status:    Occupational History    Occupation: Psychotherpist    Tobacco Use    Smoking status: Never    Smokeless tobacco: Never   Substance and Sexual Activity    Alcohol use: Yes     Alcohol/week: 5.0 standard drinks of alcohol     Types: 5 Glasses of wine per week     Comment: close to daily    Drug use: No    Sexual activity: Yes     Partners: Female     Social Drivers of Health     Financial Resource Strain: Low Risk  (6/11/2025)    Overall Financial Resource Strain (CARDIA)     Difficulty of Paying Living Expenses: Not hard at all   Food Insecurity: No Food Insecurity (6/11/2025)    Hunger Vital Sign     Worried About Running Out of Food in the Last Year:  Never true     Ran Out of Food in the Last Year: Never true   Transportation Needs: No Transportation Needs (6/11/2025)    PRAPARE - Transportation     Lack of Transportation (Medical): No     Lack of Transportation (Non-Medical): No   Physical Activity: Inactive (6/11/2025)    Exercise Vital Sign     Days of Exercise per Week: 0 days     Minutes of Exercise per Session: 0 min   Stress: No Stress Concern Present (7/25/2024)    [employer] - Occupational Stress Questionnaire     Feeling of Stress : Not at all   Housing Stability: Low Risk  (6/11/2025)    Housing Stability Vital Sign     Unable to Pay for Housing in the Last Year: No     Homeless in the Last Year: No

## 2025-07-09 NOTE — H&P (VIEW-ONLY)
Chronic Pain - New Consult    Referring Physician: Herman Palomino,*    Date: 07/09/2025     Re: Raffy Rutherford Jr.  MR#: [id number]  YOB: 1950  Age: 75 y.o.    Chief Complaint: back and leg pain  No chief complaint on file.    **This note is dictated using the [employer]*Modal Fluency Direct word recognition program. There are word recognition mistakes that are occasionally missed on review. This note was generated with the assistance of ambient listening technology. Verbal consent was obtained by the patient and accompanying visitor(s) for the recording of patient appointment to facilitate this note. I attest to having reviewed and edited the generated note for accuracy, though some syntax or spelling errors may persist. Please contact the author of this note for any clarification.   **    ASSESSMENT: 75 y.o. year old male with back, foot, leg, hand pain, consistent with     1. Right hip pain  Procedure Request Order for Pain Management      2. Idiopathic peripheral neuropathy  capsaicin-skin cleanser patch 4 patch      3. Bilateral sacroiliitis        4. Post laminectomy syndrome        5. Spinal stenosis, lumbar region, without neurogenic claudication        6. Bilateral foot-drop        7. Chronic migraine without aura without status migrainosus, not intractable        8. Charcot-Selena-Tooth atrophy        9. Lumbar radiculopathy        10. Episode of recurrent major depressive disorder, unspecified depression episode severity          **Assessment & Plan    M21.371 Foot drop, right foot  M21.372 Foot drop, left foot  M47.812 Spondylosis without myelopathy or radiculopathy, cervical region  G89.29 Other chronic pain  R26.89 Other abnormalities of gait and mobility  M62.561 Muscle wasting and atrophy, not elsewhere classified, right lower leg  M62.562 Muscle wasting and atrophy, not elsewhere classified, left lower leg  M48.07 Spinal stenosis, lumbosacral region  M50.10 Cervical disc disorder with  radiculopathy, unspecified cervical region  M16.11 Unilateral primary osteoarthritis, right hip  M46.1 Sacroiliitis, not elsewhere classified  Z96.642 Presence of left artificial hip joint  Z99.89 Dependence on other enabling machines and devices  Z79.891 Long term (current) use of opiate analgesic  G61.81 Chronic inflammatory demyelinating polyneuritis  G97.82 Other postprocedural complications and disorders of nervous system  K42.9 Umbilical hernia without obstruction or gangrene    PLAN:     SACROILIITIS:  6/24/25 - b/l SIJ - RN sed - no AC - try straight AP - 50% improvement for about 4 days. First day was the best.  -discussed SI fusion. Defer this.    CHRONIC INFLAMMATORY DEMYELINATING POLYNEURITIS:  - 7/9/25 - Qutenza 4 patches today. Risks, benefits, and alternatives were discussed with the patient, and He would like to proceed.  -discussed SCS with Nevro    Spinal stenosis with neurogenic claudication and Post-laminectomy syndrome  3/10/25 - DTP Javi - b/l L2-3 TFESI - no sed - no AC - helpful for a few weeks  - Discussed Nevro SCS as treatment.  He wants to try the qutenza and hip injection first.  - Explained trial process and potential benefits, including significant improvement in neuropathy symptoms and possible reversal.    UNILATERAL PRIMARY OSTEOARTHRITIS, RIGHT HIP:  7/17/25 - Right hip injection - no sed - no ac - needs to be the first procedure since he has a plane to catch that morning.    - RTC 1 month after qutenza  - Counseled patient regarding the importance of activity modification and physical therapy.    The above plan and management options were discussed at length with patient. Patient is in agreement with the above and verbalized understanding. It will be communicated with the referring physician via electronic record, fax, or mail.  Lab/study reports reviewed were important and necessary because subsequent medical and treatment recommendations required review of the above lab/study  reports. Images viewed/reviewed above were important and necessary because subsequent medical and treatment recommendations required review of the reviewed image(s).     Electronically signed by:  Herman Palomino DO  07/09/2025    Patient was seen in clinic today.  The total amount of time spent on this patient was exactly 62 minutes.  Due to medical complexity, time spent with the patient, and multiple issues discussed/addressed I am billing for a level 5 visit. This includes face to face time and non-face to face time preparing to see the patient by reviewing previous labs/imaging, obtaining and/or reviewing separately obtained history, documenting clinical information in the electronic or other health record, independently reviewing results and communicating results to the patient/family/caregiver/additional providers.  The available imaging was reviewed in person with the patient, pathology and treatment options were explained in detail with the patient.  The patient was given multiple opportunities to ask questions, and all questions were answered.  =========================================================================================================    SUBJECTIVE:    Interval History 7/9/2025:   Raffy Rutherford Jr. is a 75 y.o. male presents to the clinic for follow up.  Since last visit the pain has is unchanged. He is s/p SI joint injection on 6/24 but did not get significant improvement.    The pain is located in the back and legs area and does not radiate.  The pain is described as aching, burning, sharp, and stabbing    At BEST  4/10   At WORST  9/10 on the WORST day.    On average pain is rated as 6/10.   Today the pain is rated as 5/10  Symptoms interfere with daily activity, sleeping, and work.   Exacerbating factors: Sitting, Standing, and Walking.      Current pain medications: Pregabalin 175mg QHS (drowsy), Hydrocodone 2.5mg TID, urogepant, lamictal 200mg, Selegiline, Celebrex 200mg,  klonipin 0.5mg (for insomnia), methocarbamol rarely  Failed Pain Medications: all of the above    Pain procedures:   Back injections with Dr. Olivarez  3/10/25 - DTP Javi - b/l L2-3 TFESI - no sed - no AC - helpful for a few weeks  4/29/25 - botox for migraines  6/24/25 - b/l SIJ - RN sed - no AC - 50% for a few days    Spinal decompression:   Laminectomy x2  - 2012  Fusion x2 - most recent 2018 L3-S1  Hand surgery x5 - tendon / CTS  Joint replacement: left SHARAD 2012      Initial hx:  Raffy Rutherford Jr. is a 75 y.o. male presents to the clinic for the evaluation of lower back back, right leg pain. The pain started 6 months ago following no inciting event and symptoms have been worsening.  Patient thinks he might have history of charcot-vinny-tooth.  He had an EMG that diagnosed neuropathy.  Worse in the right leg. Recently getting numbness and tingling in the hands. He gets burning from the mid shin and goes to the top of the foot and the bottom of the toes.  Worse while wearing braces. He has foot drop on the right side. He has b/l AFOs. He has a lot of atrophy in the calf muscles. This has been ongoing for a number of years.  He does problems with buttoning shirts and dropping objects. He is currently in PT which has aggravated an umbilical hernia. Patient used to see Dr. Olivarez for pain but no longer on insurance.      Patient presents with severe neuropathic pain in his right leg and recent onset of numbness and tingling in his left leg. Pain is burning, starting from the mid-shin and extending into the foot, primarily affecting the top of the foot. He indicates the mid-shin area as the origin of the pain. He has pronounced foot drop on the right side and slight foot drop on the left, with significant atrophy in his right calf muscle.    His symptoms have been ongoing for several years, following a history of four back surgeries, including laminectomies and two fusions. His most recent fusion was approximately  five years ago, around 2018. He reports recent onset of dropping things from his hands and some handwriting difficulties, but denies buttoning shirt difficulties.    Current medications include hydrocodone 2.5 mg 3 times daily (7.5 mg total per day) and pregabalin 175 mg at night, occasionally adding 25 mg at dinner if neuropathic pain is severe. He previously saw Dr. Olivarez for pain management but changed due to insurance issues. Treatments have included back injections with Dr. Olivarze and Dr. Lehman, providing temporary relief. He recently received a bilateral transforaminal epidural injection a few months ago.    He reports groin pain on the right side, radiating to the hip area. His walking has worsened, especially on the right side, with difficulty controlling leg movement. He also has back pain that radiates into his pelvic area and sometimes to the side. Pain occurs when changing positions, particularly when getting up from certain chairs.    Additional medical history includes a successful left hip replacement 13 years ago. He has had five surgeries in the last few years, including two carpal tunnel surgeries and a cubital tunnel surgery. He is scheduled for a hiking trip in the [facility] in about five weeks.    He does not have central stenosis in the neck and does not have diabetes. He is right-handed.    PREVIOUS TREATMENTS:  Patient has undergone four back surgeries, starting with laminectomies and two fusions, beginning 13 years ago when he was 62 years old. He received an epidural injection which provided minimal benefit, lasting only a few weeks. He also underwent a spinal cord stimulator trial when the devices were relatively new. Patient is currently attending physical therapy, but it has aggravated a long-standing hernia. He is receiving Botox treatments for headaches from neurology. Acupuncture provided slight benefit.    MEDICATIONS:  Patient is on Hydrocodone 2.5 mg, 1 tablet 3 times  daily (total 7.5 mg per day). He takes Pregabalin (Lyrica) 175 mg, 1 tablet at night, with occasional additional 25 mg at dinner time if neuropathic pain is severe. He is currently taking Celebrex but considering stopping. Patient takes half a tablet of Methocarbamol (Robaxin) when his back is really tight. He uses Clonazepam (Klonopin) as needed for insomnia.    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 He recently taken up cello due to back issues making basses too heavy. He has had to stop playing gigs due to health issues. Patient also works as a licensed professional counselor or .     Pain Description:    The pain is located in the lower back area and radiates to the right leg.    At BEST  5/10   At WORST  8/10 on the WORST day.    On average pain is rated as 6/10.   Today the pain is rated as 3/10  The pain is continuous.  The pain is described as burning, numbing, and tingling    Symptoms interfere with daily activity.   Exacerbating factors: Sitting and Standing.    Mitigating factors heat, ice, and topicals.   He reports 7 hours of sleep per night.    Physical Therapy/Home Exercise: Yes, currently in Physical therapy    Current Pain Medications:    - Pregabalin 175mg QHS (drowsy), Hydrocodone 2.5mg TID, urogepant, lamictal 200mg, Selegiline, Celebrex 200mg, klonipin 0.5mg (for insomnia), methocarbamol rarely    Failed Pain Medications:    - all of the above    Pain Treatment Therapies:    Pain procedures:   Back injections with Dr. Olivarez  3/10/25 - ALLISON Caldwell - b/l L2-3 TFESI - no sed - no AC - helpful for a few weeks  4/29/25 - botox for migraines  Physical Therapy: yes  Chiropractor: none  Acupuncture: none  TENS unit: none  Spinal decompression:   Laminectomy x2  - 2012  Fusion x2 - most recent 2018 L3-S1  Hand surgery x5 - tendon / CTS  Joint replacement: left SHARAD 2012    Patient reports significant motor weakness and loss of sensations.  Patient  denies any suicidal or homicidal ideations     report:  Reviewed and consistent with medication use as prescribed.    Imaging:   CERVICAL MRI  Results for orders placed during the hospital encounter of 05/25/25    MRI Cervical Spine Without Contrast    Narrative  EXAMINATION:  MRI CERVICAL SPINE WITHOUT CONTRAST    CLINICAL HISTORY:  Neck pain, chronic, degenerative changes on xray; Anesthesia of skin    TECHNIQUE:  Multiplanar, multisequence MR images of the cervical spine were performed utilizing no contrast.    COMPARISON:  Cervical spine radiographs 05/15/2025, MRI cervical spine 01/04/2021    FINDINGS:  C1-C2: Dens is intact.  Pre dens space is maintained.    Alignment: Minimal grade 1 retrolisthesis of C4 on C5.    Vertebrae: C4, C7, and T2 benign osseous hemangiomas.  Additional hemangioma noted in the right C5 facet.    Discs: Multilevel mild disc desiccation with moderate height loss at C3-C4.  No sub endplate marrow edema/Modic 1 changes.    Cord: Normal.    Skull base and craniocervical junction: Normal.    Degenerative findings:    C2-C3: No spinal canal stenosis or neural foraminal narrowing.    C3-C4: Small posterior disc osteophyte complex and uncovertebral spurring.  Bilateral facet arthropathy.  Mild bilateral neural foraminal narrowing.  No spinal canal stenosis.    C4-C5: Small posterior disc osteophyte complex with uncovertebral spurring.  Bilateral facet arthropathy.  Mild right and moderate left neural foraminal narrowing.  Mild spinal canal stenosis.    C5-C6: Small posterior disc osteophyte complex with uncovertebral spurring.  Bilateral facet arthropathy.  Moderate right and mild left neural foraminal narrowing.  Mild spinal canal stenosis.    C6-C7: Small posterior disc osteophyte complex.  Bilateral small perineural cysts.  Mild bilateral neural foraminal narrowing.  No spinal canal stenosis.    C7-T1: No spinal canal stenosis or neural foraminal narrowing.  Bilateral small perineural  cysts.    Paraspinal muscles & soft tissues: Heterogeneous enlarged left thyroid gland, correlating with prior exams.  Left submandibular gland is not visualized.    Impression  Cervical spondylosis, contributing to mild spinal canal stenosis at C4-5 and C5-6 and mild/ moderate neural foraminal narrowing C3-4 through C6-7, as above.    Electronically signed by resident: Antony Vilchis  Date:    05/25/2025  Time:    15:38    Electronically signed by: Giuseppe Cleary MD  Date:    05/25/2025  Time:    16:12     LUMBAR MRI  Results for orders placed during the hospital encounter of 02/12/25    MRI Lumbar Spine Without Contrast    Narrative  EXAMINATION:  MRI LUMBAR SPINE WITHOUT CONTRAST    CLINICAL HISTORY:  Lumbar radiculopathy, symptoms persist with conservative treatment; Radiculopathy, lumbar region    TECHNIQUE:  Multiplanar, multisequence MR images were acquired from the thoracolumbar junction to the sacrum without contrast.    COMPARISON:  Radiographs 08/16/2023    FINDINGS:  Postoperative changes of instrumented fusion at L3-S1.  Solid fusion noted across the L3-L4 and L4-L5 levels.  L5-S1 fusion not certain.  No abnormal signal about the hardware to suggest loosening.    Alignment: Grade 1 retrolisthesis at L2-L3.    Vertebrae: Chronic T12 vertebral body compression fracture with vertebroplasty cement and mild height loss.  Mild anterior height loss of the L1 vertebral body.  No evidence for acute fracture.  No marrow infiltrative process.  Modic 1 degenerative endplate changes noted at L2-L3.    Discs: Severe disc height loss at L2-L3.  No evidence for discitis.    Cord: Conus terminates at L1 and appears unremarkable.  Cauda equina appears unremarkable.    Degenerative findings:    T12-L1: No spinal canal stenosis or neuroforaminal narrowing.    L1-L2: No spinal canal stenosis or neuroforaminal narrowing.    L2-L3: Circumferential disc bulge and moderate facet arthropathy result in moderate spinal canal  stenosis and mild bilateral neural foraminal narrowing.    L3-L4: Operative level.  No spinal canal stenosis or neural foraminal narrowing.    L4-L5: Operative level.  No spinal canal stenosis or neural foraminal narrowing.    L5-S1: Operative level.  Mild bilateral neural foraminal narrowing, obscured by artifact.    Paraspinal muscles & soft tissues: Moderate paraspinal muscle atrophy.    Impression  1. Postoperative changes of instrumented fusion at L3-S1, detailed above.  2. Degenerative changes primarily at L2-L3 resulting in moderate spinal canal stenosis and mild bilateral neural foraminal narrowing.      Electronically signed by: Ji Monterroso MD  Date:    02/12/2025  Time:    13:27            6/11/2025     8:11 AM 2/8/2017     8:54 AM 11/17/2015    11:23 AM 10/22/2015     2:11 PM 9/15/2015     2:19 PM 3/6/2014     9:46 AM 3/3/2014     9:21 AM   Pain Disability Index (PDI)   Family/Home Responsibilities: 6 5 7 3 6 6 3   Recreation: 6 8 6 7 7 5 10   Occupation: 6 6 8 6 7 8 6   Sexual Behavior: 6 2 5 0 7 0 4   Self Care: 6 2 3 0 5 8 1   Life-Support Activities: 6 0 0 0 0 8 0   Pain Disability Index (PDI) 42 25 35 22 37 41 30        Past Medical History:   Diagnosis Date    Allergy     Arthritis     Back pain     after trauma beginning in 195    Cataract     Chronic pain     neck and left shoulder    Cluster headache 2013    Colon polyps 2007 2007-2019: TA x5, HP x3    Degenerative disc disease     Depression     Diverticulitis 12/2013    Dry eye syndrome     Fibromyalgia 2013    GERD (gastroesophageal reflux disease)     Hepatitis 1970's    A    History of prostate biopsy 2002    Hyperlipidemia     Joint pain     Prostate cancer 07/2021    PVD (posterior vitreous detachment)     Salzmann's nodular dystrophy of left eye     Sleep apnea     Thyroid nodule 07/16/2014    Trigeminal neuralgia of left side of face     Tubular adenoma of colon 2007    5 removed [id number]    Visual disturbance 2012    problems  after cataract surgery     Past Surgical History:   Procedure Laterality Date    ARTHROSCOPIC DEBRIDEMENT OF SHOULDER Left 04/19/2024    Procedure: DEBRIDEMENT, SHOULDER, ARTHROSCOPIC;  Surgeon: GEORGIANA Up MD;  Location: [facility] OR;  Service: Orthopedics;  Laterality: Left;  Regional w/o Catheter, Interscalene, 0.5% Marcaine Plain    ARTHROSCOPIC REPAIR OF ROTATOR CUFF OF SHOULDER Left 04/19/2024    Procedure: REPAIR, ROTATOR CUFF, ARTHROSCOPIC;  Surgeon: GEORGIANA Up MD;  Location: [facility] OR;  Service: Orthopedics;  Laterality: Left;    ARTHROSCOPY OF SHOULDER WITH DECOMPRESSION OF SUBACROMIAL SPACE Left 04/19/2024    Procedure: ARTHROSCOPY, SHOULDER, WITH SUBACROMIAL SPACE DECOMPRESSION;  Surgeon: GEORGIANA Up MD;  Location: [facility] OR;  Service: Orthopedics;  Laterality: Left;    ARTHROSCOPY,SHOULDER,WITH BICEPS TENODESIS Left 04/19/2024    Procedure: ARTHROSCOPY,SHOULDER,WITH BICEPS TENODESIS;  Surgeon: GEORGIANA Up MD;  Location: [facility] OR;  Service: Orthopedics;  Laterality: Left;    BACK SURGERY      CARPAL TUNNEL RELEASE Right 05/18/2021    Procedure: RELEASE, CARPAL TUNNEL;  Surgeon: Kaye Solis MD;  Location: [facility] OR;  Service: Orthopedics;  Laterality: Right;    CATARACT EXTRACTION W/  INTRAOCULAR LENS IMPLANT Bilateral     CHOLECYSTECTOMY      COLONOSCOPY N/A 12/17/2019    Normal - Repeat 5yrs    COLONOSCOPY  2007 2007 TA x2, 2011 TA x3, 2014 HP x3, 2019 normal    COLONOSCOPY N/A 2/24/2025    Procedure: COLONOSCOPY;  Surgeon: Durga Harvey MD;  Location: [facility] ([address] [place] [address]);  Service: Endoscopy;  Laterality: N/A;  11/26 ref by GARO Quick, Suflave, instructions handed to pt in ofc and portal. [email or web address]  Jm/pt wife Valarie Rutherford rescheduled to an earlier time/ prep inst given in office / Changed prep to miralax /referral JACK barlow  Pt rescheduled to due to new ins/pt moved procedure to      COSMETIC SURGERY  02/10/2015    Direct mid-forehead brow lift    COSMETIC SURGERY  02/10/2015     Bilateral upper lid blepahroplasty    CYSTOSCOPY WITH URETEROSCOPY, RETROGRADE PYELOGRAPHY, AND INSERTION OF STENT Left 08/19/2020    Procedure: CYSTOSCOPY, WITH RETROGRADE PYELOGRAM AND URETERAL STENT INSERTION;  Surgeon: Katelynn George MD;  Location: [facility] OR;  Service: Urology;  Laterality: Left;    EPIDURAL STEROID INJECTION INTO LUMBAR SPINE Bilateral 3/10/2025    Procedure: Bilateral L2-3 TFESI;  Surgeon: Herman Palomino DO;  Location: [facility];  Service: Pain Management;  Laterality: Bilateral;  no sed-no ac    ESOPHAGOGASTRODUODENOSCOPY N/A 12/17/2019    Procedure: ESOPHAGOGASTRODUODENOSCOPY (EGD);  Surgeon: Amadou Hradin MD;  Location: [facility] ENDO (4TH FLR);  Service: Endoscopy;  Laterality: N/A;    ESOPHAGOGASTRODUODENOSCOPY N/A 2/24/2025    Procedure: EGD (ESOPHAGOGASTRODUODENOSCOPY);  Surgeon: Durga Harvey MD;  Location: [facility] ENDO (4TH FLR);  Service: Endoscopy;  Laterality: N/A;    EYE SURGERY      FUSION OF LUMBAR SPINE BY ANTERIOR APPROACH N/A 08/20/2020    Procedure: FUSION, SPINE, LUMBAR, ANTERIOR APPROACH L5-S1 ALIF Stand Alone;  Surgeon: Jason Caldwell MD;  Location: [facility] OR;  Service: Neurosurgery;  Laterality: N/A;    HEMORRHOID SURGERY      with complication of chronic bleeding for 6 weeks     INJECTION OF STEROID Right 12/06/2018    Procedure: INJECTION, STEROID Right SI Joint Block and Steroid Injection;  Surgeon: Jason Caldwell MD;  Location: [facility] OR;  Service: Neurosurgery;  Laterality: Right;  Procedure: Right SI Joint Block and Steroid Injection  Surgery Time: 30 MIN  LOS: 0  Anesthesia: MAC  Radiology: C-arm  Bed: [place] [id number] Poster  Position: Prone    INJECTION OF STEROID Right 09/19/2019    Procedure: INJECTION, STEROID Procedure: Right SI joint block nd steroid injection;  Surgeon: Jason Caldwell MD;  Location: [facility];  Service: Neurosurgery;  Laterality: Right;  Procedure: Right SI joint block nd steroid injection  Surgery Time: 30 mins  LOS:   Anesthesia:  General MAC  Radiology:C-arm  Bed: Regular Bed  Position: Prone    INJECTION, SACROILIAC JOINT Bilateral 6/24/2025    Procedure: b/l SIJ inj;  Surgeon: Herman Palomino DO;  Location: [facility]IN [facility]NA[facility]EMENT;  Service: Pain Management;  Laterality: Bilateral;  - RN sed - no AC    IRRIGATION AND DEBRIDEMENT OF UPPER EXTREMITY Left 04/07/2022    Procedure: IRRIGATION AND DEBRIDEMENT, UPPER EXTREMITY;  Surgeon: Kaye Solis MD;  Location: [facility] OR;  Service: Orthopedics;  Laterality: Left;    JOINT REPLACEMENT      KNEE SURGERY      involving arthroscopic surgery to both knees    REPAIR OF EXTENSOR TENDON Left 04/07/2022    Procedure: REPAIR, TENDON, EXTENSOR thumb, EPB and EPL;  Surgeon: Kaye Solis MD;  Location: [facility] OR;  Service: Orthopedics;  Laterality: Left;    SHOULDER SURGERY      SINUS SURGERY      left molar and sinus surgery for trigeminal neuralgia    SPINAL FUSION  06/22/2015    L3-L5 XLIF/TANA    TOTAL HIP ARTHROPLASTY  04/2012    Pt states he had total hip replacement on his left hip.    ULNAR NERVE TRANSPOSITION Left 12/16/2020    Procedure: TRANSPOSITION, NERVE, ULNAR - left carpal and cubital tunnel releases;  Surgeon: Addi Bauer MD;  Location: [facility] OR;  Service: Orthopedics;  Laterality: Left;     Social History[1]  Family History   Problem Relation Name Age of Onset    Stroke Mother Honey 86    Colon cancer Mother Honey         early/mid-60s    Uterine cancer Mother Honey 77    Pancreatitis Brother Will         acute, now s/p nathalia    Diabetes Brother Will     Macular degeneration Brother Will     Other Brother Will         foot drop    Other Father Raffy,Sr. 44        pituitary tumor- CA vs benign?    Heart attack Maternal Grandfather mgf         suspected, 50s    Migraines Sister Ida     Crohn's disease Sister Ida         w/ assoc joint issues    Arthritis Sister Ida     Tremor Daughter Rocio     Irritable bowel syndrome Son Rocky         leaning toward Crohn's dx     Neurological Disorders Son Rocky         nonspecific, benign fasciculations of calves    Tremor Son Rocky     Jaundice Grandchild Jocelyne     Other Maternal Uncle Jesse         memory issue    Other Maternal Uncle Real         memory issue    Cancer Maternal Cousin Ira         origin? maybe thyroid? 40s?    Melanoma Neg Hx      Amblyopia Neg Hx      Blindness Neg Hx      Cataracts Neg Hx      Glaucoma Neg Hx      Hypertension Neg Hx      Retinal detachment Neg Hx      Strabismus Neg Hx      Thyroid disease Neg Hx      Allergic rhinitis Neg Hx      Allergies Neg Hx      Angioedema Neg Hx      Asthma Neg Hx      Eczema Neg Hx      Urticaria Neg Hx      Rhinitis Neg Hx      Immunodeficiency Neg Hx      Atopy Neg Hx         Review of patient's allergies indicates:   Allergen Reactions    Alphagan [brimonidine]      Patient taking MASO-B Selective Inhibitor Selegiline (Emsam)    Coumadin [warfarin]      itch    Oxycodone      hiccups       Current Outpatient Medications   Medication Sig    alendronate (FOSAMAX) 70 MG tablet Take 1 tablet (70 mg total) by mouth every 7 days. Take with a full glass of water weekly    atorvastatin (LIPITOR) 40 MG tablet Take 1 tablet (40 mg total) by mouth once daily.    butalbital-acetaminophen-caffeine -40 mg (FIORICET, ESGIC) -40 mg per tablet Take 1 tablet by mouth daily as needed for 30 days.    celecoxib (CELEBREX) 200 MG capsule Take 1 capsule (200 mg total) by mouth 2 (two) times a day.    clindamycin (CLEOCIN) 150 MG capsule Take 4 capsules 1 hour prior to dental appointment    clonazePAM (KLONOPIN) 0.5 MG tablet Take 1 tablet by mouth twice a day as needed for anxiety    cycloSPORINE (VEVYE) 0.1 % Drop Place 1 drop into both eyes 2 (two) times a day.    ergocalciferol (VITAMIN D2) 50,000 unit Cap Take 1 capsule (50,000 Units total) by mouth every 7 days.    fremanezumab-vfrm (AJOVY SYRINGE) 225 mg/1.5 mL injection Inject 1.5 mLs (225 mg total) into the skin every 28  days.    HYDROcodone-acetaminophen (NORCO) 5-325 mg per tablet Take 1 tablet by mouth every 8 (eight) hours as needed for Pain.    hydrocortisone-pramoxine (ANALPRAM-HC) 2.5-1 % Crea Place rectally 3 (three) times daily.    ketoconazole (NIZORAL) 2 % cream Apply to affected areas of body folds twice daily as needed for irritation.    lamoTRIgine (LAMICTAL) 200 MG tablet Take 1 tablet (200 mg total) by mouth once daily.    methocarbamoL (ROBAXIN) 750 MG Tab Take 1 tablet (750 mg total) by mouth 3 (three) times daily.    perfluorohexyloctane, PF, (MIEBO, PF,) 100 % Drop Place 1 drop into both eyes 4 (four) times daily.    pregabalin (LYRICA) 25 MG capsule Take 1 capsule (25 mg total) by mouth 2 (two) times daily.    pregabalin (LYRICA) 75 MG capsule Take 3 capsules (225 mg total) by mouth daily at night    RABEprazole (ACIPHEX) 20 mg tablet Take 1 tablet (20 mg total) by mouth 2 (two) times daily.    selegiline (EMSAM) 12 mg/24 hr Place 1 patch onto the skin once daily.    selegiline (EMSAM) 9 mg/24 hr Place 1 patch onto the skin once daily.    traZODone (DESYREL) 100 MG tablet Take 1 tablet (100 mg total) by mouth every evening.    triamcinolone acetonide 0.025% (KENALOG) 0.025 % cream Apply to affected areas of body folds twice daily as needed for irritation. Do not use for longer than 2 weeks in a row.    ubrogepant (UBRELVY) 100 mg tablet Take 1 tablet (100 mg total) by mouth every 2 (two) hours as needed for Migraine. Max 200 mg/day.    ubrogepant (UBRELVY) 100 mg tablet Take 1 tablet (100 mg total) by mouth every 2 (two) hours as needed for Migraine. Max 200 mg/day.     Current Facility-Administered Medications   Medication    capsaicin-skin cleanser patch 4 patch       REVIEW OF SYSTEMS:    GENERAL:  No weight loss, malaise or fevers.:NO  HEENT:   No recent changes in vision or hearing:NO  NECK:  Negative for lumps, no difficulty with swallowing.:NO  RESPIRATORY:  Negative for cough, wheezing or shortness of  "breath, patient denies any recent URI.:NO  CARDIOVASCULAR:  Negative for chest pain, leg swelling or palpitations.:NO  GI:  Negative for abdominal discomfort, blood in stools or black stools or change in bowel habits.:+abdominal discomfort  MUSCULOSKELETAL:  See HPI.  SKIN:  Negative for lesions, rash, and itching.:NO  PSYCH:  No mood disorder or recent psychosocial stressors.  Patients sleep is not disturbed secondary to pain.:+depression  HEMATOLOGY/LYMPHOLOGY:  Negative for prolonged bleeding, bruising easily or swollen nodes.  Patient is not currently taking any anti-coagulants:NO  NEURO:   No history of headaches, syncope, paralysis, seizures or tremors.+migraines  All other reviewed and negative other than HPI.    OBJECTIVE:    /67 (BP Location: Left arm, Patient Position: Lying)   Pulse (!) 53   Ht 5' 8" (1.727 m)   Wt 77.7 kg (171 lb 4.8 oz)   BMI 26.05 kg/m²     PHYSICAL EXAMINATION:    GENERAL: Well appearing, in no acute distress, alert and oriented x3.  PSYCH:  Mood and affect appropriate.  SKIN: Skin color, texture, turgor normal, no rashes or lesions.  HEAD/FACE:  Normocephalic, atraumatic. Cranial nerves grossly intact.    NECK:   - Did not perform pain to palpation over the cervical paraspinous muscles.   - Spurling  Did not perform.  - Did not perform pain with neck flexion, extension, or lateral flexion.     CV: RRR with palpation of the radial artery.  PULM: CTAB. No evidence of respiratory difficulty, symmetric chest rise.  GI:  Soft and non-tender.    BACK:   - back incisions well healed  - Negative spinous process tenderness  - Positive paravertebral tenderness  - Positive pain to palpation over the facet joints of the lumbar spine.   - Positive QL / Iliac crest / Glut tenderness  - Slump test is Negative for radicular pain  - Slump test is Negative for back pain  - Supine Straight leg raising is Negative for radicular pain  - Supine Straight leg raising is Negative for back pain  - " Did not perform Sustained Hip Flexion test (for discogenic pain)  - Positive Altered Gait, Posture  - Axial facet loading test Positive on the bilateral side(s)    SI Joint exam:  - Positive SI joint tenderness to palpation  - Brent's sign Positive  - Yeoman's Test: Did not perform for SI joint pain indicating anterior SI ligament involvement. Did not perform for anterior thigh pain/paresthesia which indicates femoral nerve stretch.  - Gaenslen's Test:Positive  - Finger Blake's Sign:Positive  - SI compression test:Positive  - SI distraction test:Negative  - Thigh Thrust: Positive  - SI Thrust: Did not perform    MUSKULOSKELETAL:    EXTREMITIES:   Hip Exam:  - Log Roll Negative  - FADIR Negative  - Stinchfield Negative  - Hip Scour Positive  - GTB Tenderness Negative    MSK:  Significant atrophy of the musculature in his calves    NEUROLOGICAL EXAM:  MENTAL STATUS: A x O x 3, good concentration, speech is fluent and goal directed  MEMORY: recent and remote are intact  CN: CN2-12 grossly intact  MOTOR: 5/5 in all muscle groups except DF/PF  DTRs: 1+ intact symmetric  Sensation:    -yes Loss of sensation in a left lower and right lower feet and leg distribution.    GAIT: with AFOs.         [1]   Social History  Socioeconomic History    Marital status:    Occupational History    Occupation: Psychotherpist    Tobacco Use    Smoking status: Never    Smokeless tobacco: Never   Substance and Sexual Activity    Alcohol use: Yes     Alcohol/week: 5.0 standard drinks of alcohol     Types: 5 Glasses of wine per week     Comment: close to daily    Drug use: No    Sexual activity: Yes     Partners: Female     Social Drivers of Health     Financial Resource Strain: Low Risk  (6/11/2025)    Overall Financial Resource Strain (CARDIA)     Difficulty of Paying Living Expenses: Not hard at all   Food Insecurity: No Food Insecurity (6/11/2025)    Hunger Vital Sign     Worried About Running Out of Food in the Last Year:  Never true     Ran Out of Food in the Last Year: Never true   Transportation Needs: No Transportation Needs (6/11/2025)    PRAPARE - Transportation     Lack of Transportation (Medical): No     Lack of Transportation (Non-Medical): No   Physical Activity: Inactive (6/11/2025)    Exercise Vital Sign     Days of Exercise per Week: 0 days     Minutes of Exercise per Session: 0 min   Stress: No Stress Concern Present (7/25/2024)    [employer] - Occupational Stress Questionnaire     Feeling of Stress : Not at all   Housing Stability: Low Risk  (6/11/2025)    Housing Stability Vital Sign     Unable to Pay for Housing in the Last Year: No     Homeless in the Last Year: No

## 2025-07-09 NOTE — TELEPHONE ENCOUNTER
----- Message from Herman Palomino DO sent at 2025  9:02 AM CDT -----  Regarding: Order for DYLANJAQUI ANGEL GARCIALuisa    Patient Name: JAQUI RICHARDSON JR.([id number])  Sex: Male  : 1950      PCP: FABI ELLIOTT    Center: [facility] CENTRAL BILLING OFFICE     Types of orders made on 2025: Procedure Request    Order Date:2025  Ordering User:HERMAN PALOMINO [[id number]  Encounter Provider:Herman Palomino DO [[id number]]  Authorizing Provider: Herman Palomino DO [[id number]]  Department:[facility] PAIN MANAGEMENT[[id number]]    Common Order Information  Procedure -> Other (Specify location and laterality) Cmt: right hip    Pre-op Diagnosis -> Chronic right hip pain     Order Specific Information  Order: Procedure Request Order for Pain Management [Custom: [id number]]  Order #:          [id number]: 1 FUTURE    Priority: Routine  Class: Clinic Performed    Future Order Information      Expires on:2026            Expected by:2025                   Comment:25 - Right hip injection - no sed - no ac - needs to be the             first procedure since he has a plane to catch that morning..    Associated Diagnoses      M25.551 Right hip pain      Physician -> hanyu         Is patient on anti-coagulants? -> No         Facility Name: -> [facility]           Priority: Routine  Class: Clinic Performed    Future Order Information      Expires on:2026            Expected by:2025                   Comment:25 - Right hip injection - no sed - no ac - needs to be the             first procedure since he has a plane to catch that morning..    Associated Diagnoses      M25.551 Right hip pain      Procedure -> Other (Specify location and laterality) Cmt: right hip        Physician -> hanyu         Is patient on anti-coagulants? -> No         Pre-op Diagnosis -> Chronic right hip pain         Facility Name: -> [facility]

## 2025-07-10 ENCOUNTER — TELEPHONE (OUTPATIENT)
Dept: PAIN MEDICINE | Facility: CLINIC | Age: 75
End: 2025-07-10
Payer: MEDICARE

## 2025-07-10 ENCOUNTER — TELEPHONE (OUTPATIENT)
Facility: CLINIC | Age: 75
End: 2025-07-10
Payer: MEDICARE

## 2025-07-10 ENCOUNTER — PROCEDURE VISIT (OUTPATIENT)
Dept: NEUROLOGY | Facility: CLINIC | Age: 75
End: 2025-07-10
Payer: MEDICARE

## 2025-07-10 ENCOUNTER — PATIENT MESSAGE (OUTPATIENT)
Facility: CLINIC | Age: 75
End: 2025-07-10
Payer: MEDICARE

## 2025-07-10 VITALS
DIASTOLIC BLOOD PRESSURE: 73 MMHG | SYSTOLIC BLOOD PRESSURE: 122 MMHG | HEART RATE: 60 BPM | BODY MASS INDEX: 26.05 KG/M2 | WEIGHT: 171.31 LBS

## 2025-07-10 DIAGNOSIS — G43.909 MIGRAINE WITHOUT STATUS MIGRAINOSUS, NOT INTRACTABLE, UNSPECIFIED MIGRAINE TYPE: ICD-10-CM

## 2025-07-10 DIAGNOSIS — G43.709 CHRONIC MIGRAINE WITHOUT AURA WITHOUT STATUS MIGRAINOSUS, NOT INTRACTABLE: Primary | ICD-10-CM

## 2025-07-10 RX ORDER — BUTALBITAL, ACETAMINOPHEN AND CAFFEINE 50; 325; 40 MG/1; MG/1; MG/1
1 TABLET ORAL DAILY PRN
Qty: 15 TABLET | Refills: 2 | Status: SHIPPED | OUTPATIENT
Start: 2025-07-10

## 2025-07-11 ENCOUNTER — OFFICE VISIT (OUTPATIENT)
Dept: SURGERY | Facility: CLINIC | Age: 75
End: 2025-07-11
Payer: MEDICARE

## 2025-07-11 ENCOUNTER — PATIENT MESSAGE (OUTPATIENT)
Dept: INTERNAL MEDICINE | Facility: CLINIC | Age: 75
End: 2025-07-11
Payer: MEDICARE

## 2025-07-11 VITALS
BODY MASS INDEX: 25.71 KG/M2 | HEART RATE: 50 BPM | DIASTOLIC BLOOD PRESSURE: 70 MMHG | WEIGHT: 169.63 LBS | HEIGHT: 68 IN | SYSTOLIC BLOOD PRESSURE: 111 MMHG

## 2025-07-11 DIAGNOSIS — E04.1 THYROID NODULE: Primary | ICD-10-CM

## 2025-07-11 DIAGNOSIS — Z01.818 PRE-OP TESTING: Primary | ICD-10-CM

## 2025-07-11 DIAGNOSIS — K40.90 RIGHT INGUINAL HERNIA: ICD-10-CM

## 2025-07-11 PROCEDURE — 99999 PR PBB SHADOW E&M-EST. PATIENT-LVL V: CPT | Mod: PBBFAC,,, | Performed by: SURGERY

## 2025-07-11 NOTE — H&P (VIEW-ONLY)
History & Physical    SUBJECTIVE:     History of Present Illness:  Patient is a 75 y.o. male presents with a right inguinal hernia.  The patient was evaluated by me approximately 8 years ago with a right inguinal hernia.  We discussed surgical intervention at that time.  The patient elected to watch and wait at this time.  He has done okay since then but recently has noticed some more discomfort in the right groin.  He denies nausea or vomiting.  He had a recent CT which again showed a right inguinal hernia.  He is interested in surgical repair at this time.  Of note he has some right hip pain and SI joint pain that he is getting worked up as well.  I do feel that this hernia is causing him a problem however and we should proceed with intervention.      Review of patient's allergies indicates:   Allergen Reactions    Alphagan [brimonidine]      Patient taking MASO-B Selective Inhibitor Selegiline (Emsam)    Coumadin [warfarin]      itch    Oxycodone      hiccups       Current Medications[1]    Past Medical History:   Diagnosis Date    Allergy     Arthritis     Back pain     after trauma beginning in 195    Cataract     Chronic pain     neck and left shoulder    Cluster headache 2013    Colon polyps 2007 2007-2019: TA x5, HP x3    Degenerative disc disease     Depression     Diverticulitis 12/2013    Dry eye syndrome     Fibromyalgia 2013    GERD (gastroesophageal reflux disease)     Hepatitis 1970's    A    History of prostate biopsy 2002    Hyperlipidemia     Joint pain     Prostate cancer 07/2021    PVD (posterior vitreous detachment)     Salzmann's nodular dystrophy of left eye     Sleep apnea     Thyroid nodule 07/16/2014    Trigeminal neuralgia of left side of face     Tubular adenoma of colon 2007    5 removed [id number]    Visual disturbance 2012    problems after cataract surgery     Past Surgical History:   Procedure Laterality Date    ARTHROSCOPIC DEBRIDEMENT OF SHOULDER Left 04/19/2024    Procedure:  DEBRIDEMENT, SHOULDER, ARTHROSCOPIC;  Surgeon: GEORGIANA Up MD;  Location: [facility] OR;  Service: Orthopedics;  Laterality: Left;  Regional w/o Catheter, Interscalene, 0.5% Marcaine Plain    ARTHROSCOPIC REPAIR OF ROTATOR CUFF OF SHOULDER Left 04/19/2024    Procedure: REPAIR, ROTATOR CUFF, ARTHROSCOPIC;  Surgeon: GEORGIANA Up MD;  Location: [facility] OR;  Service: Orthopedics;  Laterality: Left;    ARTHROSCOPY OF SHOULDER WITH DECOMPRESSION OF SUBACROMIAL SPACE Left 04/19/2024    Procedure: ARTHROSCOPY, SHOULDER, WITH SUBACROMIAL SPACE DECOMPRESSION;  Surgeon: GEORGIANA Up MD;  Location: [facility] OR;  Service: Orthopedics;  Laterality: Left;    ARTHROSCOPY,SHOULDER,WITH BICEPS TENODESIS Left 04/19/2024    Procedure: ARTHROSCOPY,SHOULDER,WITH BICEPS TENODESIS;  Surgeon: GEORGIANA Up MD;  Location: [facility] OR;  Service: Orthopedics;  Laterality: Left;    BACK SURGERY      CARPAL TUNNEL RELEASE Right 05/18/2021    Procedure: RELEASE, CARPAL TUNNEL;  Surgeon: Kaye Solis MD;  Location: [facility] OR;  Service: Orthopedics;  Laterality: Right;    CATARACT EXTRACTION W/  INTRAOCULAR LENS IMPLANT Bilateral     CHOLECYSTECTOMY      COLONOSCOPY N/A 12/17/2019    Normal - Repeat 5yrs    COLONOSCOPY  2007 2007 TA x2, 2011 TA x3, 2014 HP x3, 2019 normal    COLONOSCOPY N/A 2/24/2025    Procedure: COLONOSCOPY;  Surgeon: Durga Harvey MD;  [facility]  Service: Endoscopy;  Laterality: N/A;  11/26 ref by GARO Quick, Suflave, instructions handed to pt in ofc and portal. [email or web address]  Jm/pt wife Valarie Rutherford rescheduled to an earlier time/ prep inst given in office / Changed prep to miralax /referral JACK barlow  Pt rescheduled to due to new ins/pt moved procedure to      COSMETIC SURGERY  02/10/2015    Direct mid-forehead brow lift    COSMETIC SURGERY  02/10/2015    Bilateral upper lid blepahroplasty    CYSTOSCOPY WITH URETEROSCOPY, RETROGRADE PYELOGRAPHY, AND INSERTION OF STENT Left 08/19/2020    Procedure:  CYSTOSCOPY, WITH RETROGRADE PYELOGRAM AND URETERAL STENT INSERTION;  Surgeon: Katelynn George MD;  Location: [facility] OR;  Service: Urology;  Laterality: Left;    EPIDURAL STEROID INJECTION INTO LUMBAR SPINE Bilateral 3/10/2025    Procedure: Bilateral L2-3 TFESI;  Surgeon: Herman Palomino DO;  Location: [facility]IN [facility];  Service: Pain Management;  Laterality: Bilateral;  no sed-no ac    ESOPHAGOGASTRODUODENOSCOPY N/A 12/17/2019    Procedure: ESOPHAGOGASTRODUODENOSCOPY (EGD);  Surgeon: Amadou Hardin MD;  Location: [facility] ENDO (4TH FLR);  Service: Endoscopy;  Laterality: N/A;    ESOPHAGOGASTRODUODENOSCOPY N/A 2/24/2025    Procedure: EGD (ESOPHAGOGASTRODUODENOSCOPY);  Surgeon: Durga Harvey MD;  Location: [facility] ENDO (4TH FLR);  Service: Endoscopy;  Laterality: N/A;    EYE SURGERY      FUSION OF LUMBAR SPINE BY ANTERIOR APPROACH N/A 08/20/2020    Procedure: FUSION, SPINE, LUMBAR, ANTERIOR APPROACH L5-S1 ALIF Stand Alone;  Surgeon: Jason Caldwell MD;  Location: [facility] OR;  Service: Neurosurgery;  Laterality: N/A;    HEMORRHOID SURGERY      with complication of chronic bleeding for 6 weeks     INJECTION OF STEROID Right 12/06/2018    Procedure: INJECTION, STEROID Right SI Joint Block and Steroid Injection;  Surgeon: Jason Caldwell MD;  Location: [facility];  Service: Neurosurgery;  Laterality: Right;  Procedure: Right SI Joint Block and Steroid Injection  Surgery Time: 30 MIN  LOS: 0  Anesthesia: MAC  Radiology: C-arm  Bed: [place] 4 Poster  Position: Prone    INJECTION OF STEROID Right 09/19/2019    Procedure: INJECTION, STEROID Procedure: Right SI joint block nd steroid injection;  Surgeon: Jason Caldwell MD;  Location: [facility] OR;  Service: Neurosurgery;  Laterality: Right;  Procedure: Right SI joint block nd steroid injection  Surgery Time: 30 mins  LOS:   Anesthesia: General MAC  Radiology:C-arm  Bed: Regular Bed  Position: Prone    INJECTION, SACROILIAC JOINT Bilateral 6/24/2025    Procedure: b/l SIJ inj;   Surgeon: Herman Palomino DO;  Location: [facility] PAIN MANAGEMENT;  Service: Pain Management;  Laterality: Bilateral;  - RN sed - no AC    IRRIGATION AND DEBRIDEMENT OF UPPER EXTREMITY Left 04/07/2022    Procedure: IRRIGATION AND DEBRIDEMENT, UPPER EXTREMITY;  Surgeon: Kaye Solis MD;  Location: [facility] OR;  Service: Orthopedics;  Laterality: Left;    JOINT REPLACEMENT      KNEE SURGERY      involving arthroscopic surgery to both knees    REPAIR OF EXTENSOR TENDON Left 04/07/2022    Procedure: REPAIR, TENDON, EXTENSOR thumb, EPB and EPL;  Surgeon: Kaye Solis MD;  Location: [facility] OR;  Service: Orthopedics;  Laterality: Left;    SHOULDER SURGERY      SINUS SURGERY      left molar and sinus surgery for trigeminal neuralgia    SPINAL FUSION  06/22/2015    L3-L5 XLIF/TANA    TOTAL HIP ARTHROPLASTY  04/2012    Pt states he had total hip replacement on his left hip.    ULNAR NERVE TRANSPOSITION Left 12/16/2020    Procedure: TRANSPOSITION, NERVE, ULNAR - left carpal and cubital tunnel releases;  Surgeon: Addi Bauer MD;  Location: [facility] OR;  Service: Orthopedics;  Laterality: Left;     Family History   Problem Relation Name Age of Onset    Stroke Mother Honey 86    Colon cancer Mother Honey         early/mid-60s    Uterine cancer Mother Honey 77    Pancreatitis Brother Will         acute, now s/p nathalia    Diabetes Brother Will     Macular degeneration Brother Will     Other Brother Will         foot drop    Other Father Raffy,Sr. 44        pituitary tumor- CA vs benign?    Heart attack Maternal Grandfather mgf         suspected, 50s    Migraines Sister Ida     Crohn's disease Sister Ida         w/ assoc joint issues    Arthritis Sister Ida     Tremor Daughter Rocio     Irritable bowel syndrome Son Rocky         leaning toward Crohn's dx    Neurological Disorders Son Rocky         nonspecific, benign fasciculations of calves    Tremor Son Rocky     Jaundice Grandchild Jocelyne     Other Maternal Uncle  "Jesse         memory issue    Other Maternal Uncle Real         memory issue    Cancer Maternal Cousin Ira         origin? maybe thyroid? 40s?    Melanoma Neg Hx      Amblyopia Neg Hx      Blindness Neg Hx      Cataracts Neg Hx      Glaucoma Neg Hx      Hypertension Neg Hx      Retinal detachment Neg Hx      Strabismus Neg Hx      Thyroid disease Neg Hx      Allergic rhinitis Neg Hx      Allergies Neg Hx      Angioedema Neg Hx      Asthma Neg Hx      Eczema Neg Hx      Urticaria Neg Hx      Rhinitis Neg Hx      Immunodeficiency Neg Hx      Atopy Neg Hx       Social History[2]     Review of Systems:  Review of Systems   Constitutional:  Negative for activity change, appetite change, chills, diaphoresis, fatigue and fever.   HENT:  Negative for congestion, sinus pressure, sneezing and sore throat.    Eyes:  Negative for pain, discharge, redness, itching and visual disturbance.   Respiratory:  Negative for apnea, cough, choking, chest tightness and shortness of breath.    Cardiovascular:  Negative for chest pain and palpitations.   Gastrointestinal:  Negative for abdominal distention, abdominal pain, constipation, diarrhea, nausea and vomiting.   Musculoskeletal:  Positive for back pain and gait problem. Negative for arthralgias.   Skin:  Negative for color change, pallor and rash.   Neurological:  Negative for dizziness, facial asymmetry, light-headedness and headaches.   Psychiatric/Behavioral:  Negative for agitation, behavioral problems and confusion.        OBJECTIVE:     Vital Signs (Most Recent)  Pulse: (!) 50 (07/11/25 0849)  BP: 111/70 (07/11/25 0849)  5' 8" (1.727 m)  76.9 kg (169 lb 10.3 oz)     Physical Exam:  Physical Exam  Constitutional:       General: He is not in acute distress.     Appearance: Normal appearance. He is not diaphoretic.   HENT:      Head: Normocephalic and atraumatic.      Right Ear: External ear normal.      Left Ear: External ear normal.   Eyes:      General: No scleral icterus.   "      Right eye: No discharge.         Left eye: No discharge.   Cardiovascular:      Rate and Rhythm: Normal rate and regular rhythm.   Pulmonary:      Effort: Pulmonary effort is normal. No respiratory distress.   Abdominal:      General: There is no distension.      Palpations: Abdomen is soft. There is no mass.      Tenderness: There is no abdominal tenderness. There is no guarding or rebound.      Hernia: A hernia (Right inguinal hrnia) is present.   Musculoskeletal:         General: Normal range of motion.   Skin:     General: Skin is warm and dry.      Coloration: Skin is not pale.      Findings: No erythema or rash.   Neurological:      Mental Status: He is alert and oriented to person, place, and time.   Psychiatric:         Mood and Affect: Mood normal.         Behavior: Behavior normal.         Thought Content: Thought content normal.         Judgment: Judgment normal.           ASSESSMENT/PLAN:     Right inguinal hernia    PLAN:Plan     Plan for OR for lap repair right inguinal hernia with mesh   We will evaluate left side   We will obtain consent a.m. of surgery   Will obtain PCP clearance.       Amadou Lewis MD         [1]   Current Outpatient Medications   Medication Sig Dispense Refill    alendronate (FOSAMAX) 70 MG tablet Take 1 tablet (70 mg total) by mouth every 7 days. Take with a full glass of water weekly 4 tablet 11    atorvastatin (LIPITOR) 40 MG tablet Take 1 tablet (40 mg total) by mouth once daily. 90 tablet 1    butalbital-acetaminophen-caffeine -40 mg (FIORICET, ESGIC) -40 mg per tablet Take 1 tablet by mouth daily as needed for 30 days. 15 tablet 2    celecoxib (CELEBREX) 200 MG capsule Take 1 capsule (200 mg total) by mouth 2 (two) times a day. 240 capsule 0    clindamycin (CLEOCIN) 150 MG capsule Take 4 capsules 1 hour prior to dental appointment 12 capsule 1    clonazePAM (KLONOPIN) 0.5 MG tablet Take 1 tablet by mouth twice a day as needed for anxiety 60 tablet 3     cycloSPORINE (VEVYE) 0.1 % Drop Place 1 drop into both eyes 2 (two) times a day. 2 mL 11    ergocalciferol (VITAMIN D2) 50,000 unit Cap Take 1 capsule (50,000 Units total) by mouth every 7 days. 12 capsule 3    fremanezumab-vfrm (AJOVY SYRINGE) 225 mg/1.5 mL injection Inject 1.5 mLs (225 mg total) into the skin every 28 days. 1.5 mL 5    HYDROcodone-acetaminophen (NORCO) 5-325 mg per tablet Take 1 tablet by mouth every 8 (eight) hours as needed for Pain. 90 tablet 0    hydrocortisone-pramoxine (ANALPRAM-HC) 2.5-1 % Crea Place rectally 3 (three) times daily. 30 g 2    ketoconazole (NIZORAL) 2 % cream Apply to affected areas of body folds twice daily as needed for irritation. 60 g 5    lamoTRIgine (LAMICTAL) 200 MG tablet Take 1 tablet (200 mg total) by mouth once daily. 90 tablet 3    methocarbamoL (ROBAXIN) 750 MG Tab Take 1 tablet (750 mg total) by mouth 3 (three) times daily. 60 tablet 2    perfluorohexyloctane, PF, (MIEBO, PF,) 100 % Drop Place 1 drop into both eyes 4 (four) times daily. 3 mL 11    pregabalin (LYRICA) 25 MG capsule Take 1 capsule (25 mg total) by mouth 2 (two) times daily. 180 capsule 1    pregabalin (LYRICA) 75 MG capsule Take 3 capsules (225 mg total) by mouth daily at night 270 capsule 1    RABEprazole (ACIPHEX) 20 mg tablet Take 1 tablet (20 mg total) by mouth 2 (two) times daily. 180 tablet 0    selegiline (EMSAM) 12 mg/24 hr Place 1 patch onto the skin once daily. 30 patch 11    selegiline (EMSAM) 9 mg/24 hr Place 1 patch onto the skin once daily. 30 patch 11    traZODone (DESYREL) 100 MG tablet Take 1 tablet (100 mg total) by mouth every evening. 90 tablet 0    triamcinolone acetonide 0.025% (KENALOG) 0.025 % cream Apply to affected areas of body folds twice daily as needed for irritation. Do not use for longer than 2 weeks in a row. 30 g 2    ubrogepant (UBRELVY) 100 mg tablet Take 1 tablet (100 mg total) by mouth every 2 (two) hours as needed for Migraine. Max 200 mg/day. 16 tablet 5     UNABLE TO FIND medication name: Lidocaine 4.42% solution nasal spray.  Administer 2-3 sprays in each nostril at onset of headache.  May repeat in 1 hour.  No more than 12 sprays in 24 hours. 15 mL 5     Current Facility-Administered Medications   Medication Dose Route Frequency Provider Last Rate Last Admin    onabotulinumtoxina injection 200 Units  200 Units Intramuscular q12 weeks    200 Units at 07/10/25 1015   [2]   Social History  Tobacco Use    Smoking status: Never    Smokeless tobacco: Never   Substance Use Topics    Alcohol use: Yes     Alcohol/week: 5.0 standard drinks of alcohol     Types: 5 Glasses of wine per week     Comment: close to daily    Drug use: No

## 2025-07-11 NOTE — PROGRESS NOTES
History & Physical    SUBJECTIVE:     History of Present Illness:  Patient is a 75 y.o. male presents with a right inguinal hernia.  The patient was evaluated by me approximately 8 years ago with a right inguinal hernia.  We discussed surgical intervention at that time.  The patient elected to watch and wait at this time.  He has done okay since then but recently has noticed some more discomfort in the right groin.  He denies nausea or vomiting.  He had a recent CT which again showed a right inguinal hernia.  He is interested in surgical repair at this time.  Of note he has some right hip pain and SI joint pain that he is getting worked up as well.  I do feel that this hernia is causing him a problem however and we should proceed with intervention.      Review of patient's allergies indicates:   Allergen Reactions    Alphagan [brimonidine]      Patient taking MASO-B Selective Inhibitor Selegiline (Emsam)    Coumadin [warfarin]      itch    Oxycodone      hiccups       Current Medications[1]    Past Medical History:   Diagnosis Date    Allergy     Arthritis     Back pain     after trauma beginning in 195    Cataract     Chronic pain     neck and left shoulder    Cluster headache 2013    Colon polyps 2007 2007-2019: TA x5, HP x3    Degenerative disc disease     Depression     Diverticulitis 12/2013    Dry eye syndrome     Fibromyalgia 2013    GERD (gastroesophageal reflux disease)     Hepatitis 1970's    A    History of prostate biopsy 2002    Hyperlipidemia     Joint pain     Prostate cancer 07/2021    PVD (posterior vitreous detachment)     Salzmann's nodular dystrophy of left eye     Sleep apnea     Thyroid nodule 07/16/2014    Trigeminal neuralgia of left side of face     Tubular adenoma of colon 2007    5 removed [id number]    Visual disturbance 2012    problems after cataract surgery     Past Surgical History:   Procedure Laterality Date    ARTHROSCOPIC DEBRIDEMENT OF SHOULDER Left 04/19/2024    Procedure:  DEBRIDEMENT, SHOULDER, ARTHROSCOPIC;  Surgeon: GEORGIANA Up MD;  Location: [facility] OR;  Service: Orthopedics;  Laterality: Left;  Regional w/o Catheter, Interscalene, 0.5% Marcaine Plain    ARTHROSCOPIC REPAIR OF ROTATOR CUFF OF SHOULDER Left 04/19/2024    Procedure: REPAIR, ROTATOR CUFF, ARTHROSCOPIC;  Surgeon: GEORGIANA Up MD;  Location: [facility] OR;  Service: Orthopedics;  Laterality: Left;    ARTHROSCOPY OF SHOULDER WITH DECOMPRESSION OF SUBACROMIAL SPACE Left 04/19/2024    Procedure: ARTHROSCOPY, SHOULDER, WITH SUBACROMIAL SPACE DECOMPRESSION;  Surgeon: GEORGIANA Up MD;  Location: [facility] OR;  Service: Orthopedics;  Laterality: Left;    ARTHROSCOPY,SHOULDER,WITH BICEPS TENODESIS Left 04/19/2024    Procedure: ARTHROSCOPY,SHOULDER,WITH BICEPS TENODESIS;  Surgeon: GEORGIANA Up MD;  Location: [facility] OR;  Service: Orthopedics;  Laterality: Left;    BACK SURGERY      CARPAL TUNNEL RELEASE Right 05/18/2021    Procedure: RELEASE, CARPAL TUNNEL;  Surgeon: Kaye Solis MD;  Location: [facility] OR;  Service: Orthopedics;  Laterality: Right;    CATARACT EXTRACTION W/  INTRAOCULAR LENS IMPLANT Bilateral     CHOLECYSTECTOMY      COLONOSCOPY N/A 12/17/2019    Normal - Repeat 5yrs    COLONOSCOPY  2007 2007 TA x2, 2011 TA x3, 2014 HP x3, 2019 normal    COLONOSCOPY N/A 2/24/2025    Procedure: COLONOSCOPY;  Surgeon: Durga Harvey MD;  [facility]  Service: Endoscopy;  Laterality: N/A;  11/26 ref by GARO Quick, Suflave, instructions handed to pt in ofc and portal. [email or web address]  Jm/pt wife Valarie Rutherford rescheduled to an earlier time/ prep inst given in office / Changed prep to miralax /referral JACK barlow  Pt rescheduled to due to new ins/pt moved procedure to      COSMETIC SURGERY  02/10/2015    Direct mid-forehead brow lift    COSMETIC SURGERY  02/10/2015    Bilateral upper lid blepahroplasty    CYSTOSCOPY WITH URETEROSCOPY, RETROGRADE PYELOGRAPHY, AND INSERTION OF STENT Left 08/19/2020    Procedure:  CYSTOSCOPY, WITH RETROGRADE PYELOGRAM AND URETERAL STENT INSERTION;  Surgeon: Katelynn George MD;  Location: [facility] OR;  Service: Urology;  Laterality: Left;    EPIDURAL STEROID INJECTION INTO LUMBAR SPINE Bilateral 3/10/2025    Procedure: Bilateral L2-3 TFESI;  Surgeon: Herman Palomino DO;  Location: [facility] PAIN MANAGEMENT;  Service: Pain Management;  Laterality: Bilateral;  no sed-no ac    ESOPHAGOGASTRODUODENOSCOPY N/A 12/17/2019    Procedure: ESOPHAGOGASTRODUODENOSCOPY (EGD);  Surgeon: Amadou Hardin MD;  Location: [facility] ENDO (4TH FLR);  Service: Endoscopy;  Laterality: N/A;    ESOPHAGOGASTRODUODENOSCOPY N/A 2/24/2025    Procedure: EGD (ESOPHAGOGASTRODUODENOSCOPY);  Surgeon: Durga Harvey MD;  Location: [facility] ENDO (4TH FLR);  Service: Endoscopy;  Laterality: N/A;    EYE SURGERY      FUSION OF LUMBAR SPINE BY ANTERIOR APPROACH N/A 08/20/2020    Procedure: FUSION, SPINE, LUMBAR, ANTERIOR APPROACH L5-S1 ALIF Stand Alone;  Surgeon: Jason Caldwell MD;  Location: [facility] OR;  Service: Neurosurgery;  Laterality: N/A;    HEMORRHOID SURGERY      with complication of chronic bleeding for 6 weeks     INJECTION OF STEROID Right 12/06/2018    Procedure: INJECTION, STEROID Right SI Joint Block and Steroid Injection;  Surgeon: Jason Caldwell MD;  Location: [facility];  Service: Neurosurgery;  Laterality: Right;  Procedure: Right SI Joint Block and Steroid Injection  Surgery Time: 30 MIN  LOS: 0  Anesthesia: MAC  Radiology: C-arm  Bed: [place] 4 Poster  Position: Prone    INJECTION OF STEROID Right 09/19/2019    Procedure: INJECTION, STEROID Procedure: Right SI joint block nd steroid injection;  Surgeon: Jason Caldwell MD;  Location: [facility] OR;  Service: Neurosurgery;  Laterality: Right;  Procedure: Right SI joint block nd steroid injection  Surgery Time: 30 mins  LOS:   Anesthesia: General MAC  Radiology:C-arm  Bed: Regular Bed  Position: Prone    INJECTION, SACROILIAC JOINT Bilateral 6/24/2025    Procedure: b/l SIJ inj;   Surgeon: Herman Palomino DO;  Location: [facility] PAIN MANAGEMENT;  Service: Pain Management;  Laterality: Bilateral;  - RN sed - no AC    IRRIGATION AND DEBRIDEMENT OF UPPER EXTREMITY Left 04/07/2022    Procedure: IRRIGATION AND DEBRIDEMENT, UPPER EXTREMITY;  Surgeon: Kaye Solis MD;  Location: [facility] OR;  Service: Orthopedics;  Laterality: Left;    JOINT REPLACEMENT      KNEE SURGERY      involving arthroscopic surgery to both knees    REPAIR OF EXTENSOR TENDON Left 04/07/2022    Procedure: REPAIR, TENDON, EXTENSOR thumb, EPB and EPL;  Surgeon: Kaye Solis MD;  Location: [facility] OR;  Service: Orthopedics;  Laterality: Left;    SHOULDER SURGERY      SINUS SURGERY      left molar and sinus surgery for trigeminal neuralgia    SPINAL FUSION  06/22/2015    L3-L5 XLIF/TANA    TOTAL HIP ARTHROPLASTY  04/2012    Pt states he had total hip replacement on his left hip.    ULNAR NERVE TRANSPOSITION Left 12/16/2020    Procedure: TRANSPOSITION, NERVE, ULNAR - left carpal and cubital tunnel releases;  Surgeon: dAdi Bauer MD;  Location: [facility] OR;  Service: Orthopedics;  Laterality: Left;     Family History   Problem Relation Name Age of Onset    Stroke Mother Honey 86    Colon cancer Mother Honey         early/mid-60s    Uterine cancer Mother Honey 77    Pancreatitis Brother Will         acute, now s/p nathalia    Diabetes Brother Will     Macular degeneration Brother Will     Other Brother Will         foot drop    Other Father Raffy,Sr. 44        pituitary tumor- CA vs benign?    Heart attack Maternal Grandfather mgf         suspected, 50s    Migraines Sister Ida     Crohn's disease Sister Ida         w/ assoc joint issues    Arthritis Sister Ida     Tremor Daughter Rocio     Irritable bowel syndrome Son Rocky         leaning toward Crohn's dx    Neurological Disorders Son Rocky         nonspecific, benign fasciculations of calves    Tremor Son Rocky     Jaundice Grandchild Jocelyne     Other Maternal Uncle  "Jsese         memory issue    Other Maternal Uncle Real         memory issue    Cancer Maternal Cousin Ira         origin? maybe thyroid? 40s?    Melanoma Neg Hx      Amblyopia Neg Hx      Blindness Neg Hx      Cataracts Neg Hx      Glaucoma Neg Hx      Hypertension Neg Hx      Retinal detachment Neg Hx      Strabismus Neg Hx      Thyroid disease Neg Hx      Allergic rhinitis Neg Hx      Allergies Neg Hx      Angioedema Neg Hx      Asthma Neg Hx      Eczema Neg Hx      Urticaria Neg Hx      Rhinitis Neg Hx      Immunodeficiency Neg Hx      Atopy Neg Hx       Social History[2]     Review of Systems:  Review of Systems   Constitutional:  Negative for activity change, appetite change, chills, diaphoresis, fatigue and fever.   HENT:  Negative for congestion, sinus pressure, sneezing and sore throat.    Eyes:  Negative for pain, discharge, redness, itching and visual disturbance.   Respiratory:  Negative for apnea, cough, choking, chest tightness and shortness of breath.    Cardiovascular:  Negative for chest pain and palpitations.   Gastrointestinal:  Negative for abdominal distention, abdominal pain, constipation, diarrhea, nausea and vomiting.   Musculoskeletal:  Positive for back pain and gait problem. Negative for arthralgias.   Skin:  Negative for color change, pallor and rash.   Neurological:  Negative for dizziness, facial asymmetry, light-headedness and headaches.   Psychiatric/Behavioral:  Negative for agitation, behavioral problems and confusion.        OBJECTIVE:     Vital Signs (Most Recent)  Pulse: (!) 50 (07/11/25 0849)  BP: 111/70 (07/11/25 0849)  5' 8" (1.727 m)  76.9 kg (169 lb 10.3 oz)     Physical Exam:  Physical Exam  Constitutional:       General: He is not in acute distress.     Appearance: Normal appearance. He is not diaphoretic.   HENT:      Head: Normocephalic and atraumatic.      Right Ear: External ear normal.      Left Ear: External ear normal.   Eyes:      General: No scleral icterus.   "      Right eye: No discharge.         Left eye: No discharge.   Cardiovascular:      Rate and Rhythm: Normal rate and regular rhythm.   Pulmonary:      Effort: Pulmonary effort is normal. No respiratory distress.   Abdominal:      General: There is no distension.      Palpations: Abdomen is soft. There is no mass.      Tenderness: There is no abdominal tenderness. There is no guarding or rebound.      Hernia: A hernia (Right inguinal hrnia) is present.   Musculoskeletal:         General: Normal range of motion.   Skin:     General: Skin is warm and dry.      Coloration: Skin is not pale.      Findings: No erythema or rash.   Neurological:      Mental Status: He is alert and oriented to person, place, and time.   Psychiatric:         Mood and Affect: Mood normal.         Behavior: Behavior normal.         Thought Content: Thought content normal.         Judgment: Judgment normal.           ASSESSMENT/PLAN:     Right inguinal hernia    PLAN:Plan     Plan for OR for lap repair right inguinal hernia with mesh   We will evaluate left side   We will obtain consent a.m. of surgery   Will obtain PCP clearance.       Amadou Lewis MD         [1]   Current Outpatient Medications   Medication Sig Dispense Refill    alendronate (FOSAMAX) 70 MG tablet Take 1 tablet (70 mg total) by mouth every 7 days. Take with a full glass of water weekly 4 tablet 11    atorvastatin (LIPITOR) 40 MG tablet Take 1 tablet (40 mg total) by mouth once daily. 90 tablet 1    butalbital-acetaminophen-caffeine -40 mg (FIORICET, ESGIC) -40 mg per tablet Take 1 tablet by mouth daily as needed for 30 days. 15 tablet 2    celecoxib (CELEBREX) 200 MG capsule Take 1 capsule (200 mg total) by mouth 2 (two) times a day. 240 capsule 0    clindamycin (CLEOCIN) 150 MG capsule Take 4 capsules 1 hour prior to dental appointment 12 capsule 1    clonazePAM (KLONOPIN) 0.5 MG tablet Take 1 tablet by mouth twice a day as needed for anxiety 60 tablet 3     cycloSPORINE (VEVYE) 0.1 % Drop Place 1 drop into both eyes 2 (two) times a day. 2 mL 11    ergocalciferol (VITAMIN D2) 50,000 unit Cap Take 1 capsule (50,000 Units total) by mouth every 7 days. 12 capsule 3    fremanezumab-vfrm (AJOVY SYRINGE) 225 mg/1.5 mL injection Inject 1.5 mLs (225 mg total) into the skin every 28 days. 1.5 mL 5    HYDROcodone-acetaminophen (NORCO) 5-325 mg per tablet Take 1 tablet by mouth every 8 (eight) hours as needed for Pain. 90 tablet 0    hydrocortisone-pramoxine (ANALPRAM-HC) 2.5-1 % Crea Place rectally 3 (three) times daily. 30 g 2    ketoconazole (NIZORAL) 2 % cream Apply to affected areas of body folds twice daily as needed for irritation. 60 g 5    lamoTRIgine (LAMICTAL) 200 MG tablet Take 1 tablet (200 mg total) by mouth once daily. 90 tablet 3    methocarbamoL (ROBAXIN) 750 MG Tab Take 1 tablet (750 mg total) by mouth 3 (three) times daily. 60 tablet 2    perfluorohexyloctane, PF, (MIEBO, PF,) 100 % Drop Place 1 drop into both eyes 4 (four) times daily. 3 mL 11    pregabalin (LYRICA) 25 MG capsule Take 1 capsule (25 mg total) by mouth 2 (two) times daily. 180 capsule 1    pregabalin (LYRICA) 75 MG capsule Take 3 capsules (225 mg total) by mouth daily at night 270 capsule 1    RABEprazole (ACIPHEX) 20 mg tablet Take 1 tablet (20 mg total) by mouth 2 (two) times daily. 180 tablet 0    selegiline (EMSAM) 12 mg/24 hr Place 1 patch onto the skin once daily. 30 patch 11    selegiline (EMSAM) 9 mg/24 hr Place 1 patch onto the skin once daily. 30 patch 11    traZODone (DESYREL) 100 MG tablet Take 1 tablet (100 mg total) by mouth every evening. 90 tablet 0    triamcinolone acetonide 0.025% (KENALOG) 0.025 % cream Apply to affected areas of body folds twice daily as needed for irritation. Do not use for longer than 2 weeks in a row. 30 g 2    ubrogepant (UBRELVY) 100 mg tablet Take 1 tablet (100 mg total) by mouth every 2 (two) hours as needed for Migraine. Max 200 mg/day. 16 tablet 5     UNABLE TO FIND medication name: Lidocaine 4.42% solution nasal spray.  Administer 2-3 sprays in each nostril at onset of headache.  May repeat in 1 hour.  No more than 12 sprays in 24 hours. 15 mL 5     Current Facility-Administered Medications   Medication Dose Route Frequency Provider Last Rate Last Admin    onabotulinumtoxina injection 200 Units  200 Units Intramuscular q12 weeks    200 Units at 07/10/25 1015   [2]   Social History  Tobacco Use    Smoking status: Never    Smokeless tobacco: Never   Substance Use Topics    Alcohol use: Yes     Alcohol/week: 5.0 standard drinks of alcohol     Types: 5 Glasses of wine per week     Comment: close to daily    Drug use: No

## 2025-07-13 ENCOUNTER — PATIENT MESSAGE (OUTPATIENT)
Dept: REHABILITATION | Facility: HOSPITAL | Age: 75
End: 2025-07-13
Payer: MEDICARE

## 2025-07-14 ENCOUNTER — TELEPHONE (OUTPATIENT)
Dept: PAIN MEDICINE | Facility: CLINIC | Age: 75
End: 2025-07-14
Payer: MEDICARE

## 2025-07-15 ENCOUNTER — TELEPHONE (OUTPATIENT)
Dept: INTERNAL MEDICINE | Facility: CLINIC | Age: 75
End: 2025-07-15
Payer: MEDICARE

## 2025-07-15 NOTE — TELEPHONE ENCOUNTER
----- Message from TOÑITO Richmond sent at 7/15/2025 10:56 AM CDT -----  Hi-    Pt is scheduled for a laparoscopic right inguinal hernia repair under general anesthesia with Dr. Lewis on 7/31.  Prior to proceeding he would like to make sure that pt is optimized from a medical standpoint.  Please advise.    Thanks,  Yi

## 2025-07-16 ENCOUNTER — OFFICE VISIT (OUTPATIENT)
Facility: CLINIC | Age: 75
End: 2025-07-16
Payer: MEDICARE

## 2025-07-16 ENCOUNTER — TELEPHONE (OUTPATIENT)
Dept: NEUROLOGY | Facility: CLINIC | Age: 75
End: 2025-07-16
Payer: MEDICARE

## 2025-07-16 ENCOUNTER — HOSPITAL ENCOUNTER (OUTPATIENT)
Dept: CARDIOLOGY | Facility: CLINIC | Age: 75
Discharge: HOME | End: 2025-07-16
Payer: MEDICARE

## 2025-07-16 ENCOUNTER — PROCEDURE VISIT (OUTPATIENT)
Facility: CLINIC | Age: 75
End: 2025-07-16
Payer: MEDICARE

## 2025-07-16 DIAGNOSIS — K40.90 RIGHT INGUINAL HERNIA: ICD-10-CM

## 2025-07-16 DIAGNOSIS — Z01.818 PRE-OP TESTING: ICD-10-CM

## 2025-07-16 DIAGNOSIS — G62.89 OTHER POLYNEUROPATHY: ICD-10-CM

## 2025-07-16 DIAGNOSIS — R29.898 HAND WEAKNESS: Primary | ICD-10-CM

## 2025-07-16 LAB
OHS QRS DURATION: 144 MS
OHS QTC CALCULATION: 412 MS

## 2025-07-16 PROCEDURE — G2211 COMPLEX E/M VISIT ADD ON: HCPCS | Mod: S$GLB,,, | Performed by: PSYCHIATRY & NEUROLOGY

## 2025-07-16 PROCEDURE — 95913 NRV CNDJ TEST 13/> STUDIES: CPT | Mod: S$GLB,,, | Performed by: PSYCHIATRY & NEUROLOGY

## 2025-07-16 PROCEDURE — 99999 PR PBB SHADOW E&M-EST. PATIENT-LVL II: CPT | Mod: PBBFAC,,, | Performed by: PSYCHIATRY & NEUROLOGY

## 2025-07-16 PROCEDURE — 99499 UNLISTED E&M SERVICE: CPT | Mod: S$GLB,,, | Performed by: PSYCHIATRY & NEUROLOGY

## 2025-07-16 PROCEDURE — 3044F HG A1C LEVEL LT 7.0%: CPT | Mod: CPTII,S$GLB,, | Performed by: PSYCHIATRY & NEUROLOGY

## 2025-07-16 PROCEDURE — 95885 MUSC TST DONE W/NERV TST LIM: CPT | Mod: S$GLB,,, | Performed by: PSYCHIATRY & NEUROLOGY

## 2025-07-16 PROCEDURE — 99215 OFFICE O/P EST HI 40 MIN: CPT | Mod: 24,S$GLB,, | Performed by: PSYCHIATRY & NEUROLOGY

## 2025-07-16 PROCEDURE — 1157F ADVNC CARE PLAN IN RCRD: CPT | Mod: CPTII,S$GLB,, | Performed by: PSYCHIATRY & NEUROLOGY

## 2025-07-16 PROCEDURE — 93000 ELECTROCARDIOGRAM COMPLETE: CPT | Mod: S$GLB,,, | Performed by: STUDENT IN AN ORGANIZED HEALTH CARE EDUCATION/TRAINING PROGRAM

## 2025-07-16 NOTE — PROGRESS NOTES
[facility] - NEUROLOGY 7TH [address] [facility], [place]         Patient ID: [id number]       Chief Complaint:  Neuropathy; NCS/EMG follow up     Subjective:       History of Present Illness      Mr. Rutherford presents today for follow up of neuropathy and spinal issues.    MUSCLE WASTING AND WEIGHT LOSS:  He reports progressive muscle wasting with ongoing muscle mass reduction. Multiple physical therapists have indicated difficulty in muscle strengthening. He has experienced recent weight loss with his physician expressing concern about muscle mass decline. He has been advised to remain active to mitigate muscle loss and remains engaged in physical activities, including adaptive skiing, to maintain mobility and muscle function.    EXERCISE-RELATED PAIN:  He reports increased pain with exercise. When performing prescribed exercises, he experiences worsening pain. He acknowledges understanding the importance of rehabilitation and pain management, but notes that physical activity exacerbates his discomfort.    MEDICAL HISTORY:  He has a history of non-metastatic prostate cancer which remains localized without evidence of metastatic spread. He had previous trigger finger successfully treated with injection.    SURGICAL HISTORY:  He has undergone previous cubital tunnel surgery on one side, which was reportedly successful. He also had bilateral carpal tunnel surgeries, with the left side being slightly more affected than the right. His entire spine is currently fused.    IMAGING:  Cervical spine MRI demonstrates expected aging changes. Lumbar spine MRI from February reveals a significant disc bulge at L2-L3 level, consistent with his prior spinal fusion history.        ROS:  General: +weight loss  Musculoskeletal: +muscle pain, +muscle wasting, +pain with movement   The balance of systems is otherwise negative.         Past Medical History:  -------------------------------------    Allergy     Arthritis    Back pain    after trauma beginning in 89    Cataract    Chronic pain    neck and left shoulder    Cluster headache    Colon polyps    [id number]: TA x5, HP x3    Degenerative disc disease    Depression    Diverticulitis    Dry eye syndrome    Fibromyalgia    GERD (gastroesophageal reflux disease)    Hepatitis    A    History of prostate biopsy    Hyperlipidemia    Joint pain    Prostate cancer    PVD (posterior vitreous detachment)    Salzmann's nodular dystrophy of left eye    Sleep apnea    Thyroid nodule    Trigeminal neuralgia of left side of face    Tubular adenoma of colon    5 removed [id number]    Visual disturbance    problems after cataract surgery       Allergies:  Review of patient's allergies indicates:   Allergen Reactions    Alphagan [brimonidine]      Patient taking MASO-B Selective Inhibitor Selegiline (Emsam)    Coumadin [warfarin]      itch    Oxycodone      hiccups       Pertinent Family History:  Family History   Problem Relation Name Age of Onset    Stroke Mother Honey 86    Colon cancer Mother Honey         early/mid-60s    Uterine cancer Mother Honey 77    Pancreatitis Brother Will         acute, now s/p nathalia    Diabetes Brother Will     Macular degeneration Brother Will     Other Brother Will         foot drop    Other Father Raffy,Sr. 44        pituitary tumor- CA vs benign?    Heart attack Maternal Grandfather mgf         suspected, 50s    Migraines Sister Ida     Crohn's disease Sister Ida         w/ assoc joint issues    Arthritis Sister Ida     Tremor Daughter Rocio     Irritable bowel syndrome Son Rocky         leaning toward Crohn's dx    Neurological Disorders Son Rocky         nonspecific, benign fasciculations of calves    Tremor Son Rocky     Jaundice Grandchild Jocelyne     Other Maternal Uncle Jesse         memory issue    Other Maternal Uncle Real         memory issue    Cancer Maternal Cousin Ira         origin? maybe thyroid? 40s?    Melanoma Neg Hx       Amblyopia Neg Hx      Blindness Neg Hx      Cataracts Neg Hx      Glaucoma Neg Hx      Hypertension Neg Hx      Retinal detachment Neg Hx      Strabismus Neg Hx      Thyroid disease Neg Hx      Allergic rhinitis Neg Hx      Allergies Neg Hx      Angioedema Neg Hx      Asthma Neg Hx      Eczema Neg Hx      Urticaria Neg Hx      Rhinitis Neg Hx      Immunodeficiency Neg Hx      Atopy Neg Hx         Pertinent Social History:  Social History[1]    Medications:  Current Outpatient Medications   Medication Instructions    AJOVY SYRINGE 225 mg, Subcutaneous, Every 28 days    alendronate (FOSAMAX) 70 mg, Oral, Every 7 days, Take with a full glass of water weekly    atorvastatin (LIPITOR) 40 mg, Oral, Daily    butalbital-acetaminophen-caffeine -40 mg (FIORICET, ESGIC) -40 mg per tablet Take 1 tablet by mouth daily as needed for 30 days.    celecoxib (CELEBREX) 200 mg, Oral, 2 times daily    clindamycin (CLEOCIN) 150 MG capsule Take 4 capsules 1 hour prior to dental appointment    clonazePAM (KLONOPIN) 0.5 MG tablet Take 1 tablet by mouth twice a day as needed for anxiety    cycloSPORINE (VEVYE) 0.1 % Drop 1 drop, Both Eyes, 2 times daily    ergocalciferol (VITAMIN D2) 50,000 Units, Oral, Every 7 days    HYDROcodone-acetaminophen (NORCO) 5-325 mg per tablet 1 tablet, Oral, Every 8 hours PRN    hydrocortisone-pramoxine (ANALPRAM-HC) 2.5-1 % Crea Rectal, 3 times daily    ketoconazole (NIZORAL) 2 % cream Apply to affected areas of body folds twice daily as needed for irritation.    lamoTRIgine (LAMICTAL) 200 mg, Oral, Daily    methocarbamoL (ROBAXIN) 750 mg, Oral, 3 times daily    perfluorohexyloctane, PF, (MIEBO, PF,) 100 % Drop 1 drop, Both Eyes, 4 times daily    pregabalin (LYRICA) 75 MG capsule Take 3 capsules (225 mg total) by mouth daily at night    pregabalin (LYRICA) 25 mg, Oral, 2 times daily    RABEprazole (ACIPHEX) 20 mg, Oral, 2 times daily    selegiline (EMSAM) 12 mg/24 hr 1 patch, Transdermal, Daily     selegiline (EMSAM) 9 mg/24 hr 1 patch, Transdermal, Daily    traZODone (DESYREL) 100 mg, Oral, Nightly    triamcinolone acetonide 0.025% (KENALOG) 0.025 % cream Apply to affected areas of body folds twice daily as needed for irritation. Do not use for longer than 2 weeks in a row.    UBRELVY 100 mg, Oral, Every 2 hours PRN, Max 200 mg/day.    UNABLE TO FIND medication name: Lidocaine 4.42% solution nasal spray.  Administer 2-3 sprays in each nostril at onset of headache.  May repeat in 1 hour.  No more than 12 sprays in 24 hours.        Objective:     General:  Well-appearing, well-nourished and in NAD  HEENT:  Normocephalic, atraumatic  Musculoskeletal:  Normal joints  Extremities:  No clubbing, cyanosis, or edema    Neurologic Exam:   Awake, alert, and oriented x3  Speech spontaneous and fluent, intact comprehension.   Adequate fund of knowledge, vocabulary.    Sensory:  Decreased lower extremities    Coordination:  No tremors at rest or with action    Gait:  Wears AFO's    Pertinent lab results  Lab Results   Component Value Date    FOLATE 11.0 08/14/2024    OXBLQWGN27 729 08/14/2024    HOMOCYSTEINE 7.0 03/13/2007    THIAMINEBLOO 70 09/03/2020    VITAMINB6 20 12/21/2020    HHQJYTRL55BL 31 08/14/2024    COPPER 1056 09/03/2020     Lab Results   Component Value Date    ARSENICBLD <1 06/12/2023    LEADBLOOD 1.2 06/12/2023    CADMIUM 0.3 06/12/2023    MERCURYBLOOD <1 06/12/2023     Lab Results   Component Value Date    PATHINTPSPE REVIEWED 09/03/2020    PATHINTPSIF REVIEWED 09/03/2020     Lab Results   Component Value Date    RF <13.0 06/12/2023    SEDRATE 17 (H) 09/03/2020    PERINUCLEARP <1:20 06/12/2023     Lab Results   Component Value Date    RPR Non-reactive 09/03/2020    HEPBSAG Non-reactive 08/28/2023     Lab Results   Component Value Date    IGGSERUM 995 02/02/2018     02/02/2018    IGM 23 (L) 02/02/2018    TSH 0.588 07/01/2025    FREET4 0.89 02/25/2019    WBC 7.27 07/01/2025    LYMPH 27.4  07/01/2025    LYMPH 1.99 07/01/2025    RBC 4.78 07/01/2025    HGB 15.0 07/01/2025    HCT 44.2 07/01/2025    MCV 93 07/01/2025     07/01/2025     07/01/2025    K 4.5 07/01/2025    CO2 30 (H) 07/01/2025    BUN 17 07/01/2025    CREATININE 0.8 07/01/2025    CALCIUM 9.2 07/01/2025    AST 27 07/01/2025    ALT 37 07/01/2025            Assessment and Plan:   Mr. Rutherford is a 75 y.o. RH male with peripheral neuropathy of the lower extremities.  Nerve conduciton studies of the upper extremities were normal except for mild prolongation of the ulnar and median sensory nerves at the wrist (transcarpal studies).        Assessment & Plan    PERIPHERAL NEUROPATHY:   Evaluated upper body neuropathy and compression syndromes via EMG/NCS showing overall normal upper body nerves, with mild carpal tunnel syndrome bilaterally affecting only sensory nerves and mild ulnar nerve compression at wrist bilaterally, left slightly worse than right.   Lower body shows more significant neuropathy, with right side worse than left.   EMG abnormalities noted above the knee, possibly related to hip issues or lumbar spine.   Neuropathy has not progressed to upper body.   Explained EMG/NCS results and relationship between spinal issues, particularly L2-L3 bulge, and lower body symptoms.   Explained that muscle wasting in thigh likely related to lumbar spine issues.   Symptoms likely spinal in origin, not indicative of muscle disease.    CERVICAL SPONDYLOSIS:   Cervical spine may be contributory, but EMG was underwhelming.    LUMBAR SPONDYLOSIS:   Reviewed February lumbar MRI showing fusion and significant bulge at L2-L3.    OCCUPATIONAL THERAPY:   Recommend engaging in occupational therapy for upper body.   Ordered occupational therapy.    PHYSICAL THERAPY:   Recommend participating in physical therapy for lower body.    REFERRAL:   Referred to Dr. Solis (hand surgeon) for follow-up.    GENERAL RECOMMENDATIONS:   Mr. Rutherford to continue staying  active, including adaptive skiing.    FOLLOW-UP:   Follow up in 4 months.   Contact office if appointment needs to be changed.    PLAN SUMMARY:   Engage in occupational therapy for upper body   Continue staying active, including adaptive skiing   Participate in physical therapy for lower body   Referral to Dr. Solis (hand surgeon) for follow-up   Ordered occupational therapy   Follow up in 4 months to review progress and EMG/NCS results                  This is a patient with a complex neurologic diagnosis whose overall, ongoing care is being managed and monitored by me and our Neurology clinic.   As such, since 2024,  is the appropriate add-on code to accompany the other E/M billing for this visit.    This note was generated with the assistance of ambient listening technology. Verbal consent was obtained by the patient and accompanying visitor(s) for the recording of patient appointment to facilitate this note. I attest to having reviewed and edited the generated note for accuracy, though some syntax or spelling errors may persist. Please contact the author of this note for any clarification.                       [1]   Social History  Tobacco Use    Smoking status: Never    Smokeless tobacco: Never   Substance Use Topics    Alcohol use: Yes     Alcohol/week: 5.0 standard drinks of alcohol     Types: 5 Glasses of wine per week     Comment: close to daily    Drug use: No

## 2025-07-17 ENCOUNTER — HOSPITAL ENCOUNTER (OUTPATIENT)
Facility: HOSPITAL | Age: 75
Discharge: HOME | End: 2025-07-17
Attending: STUDENT IN AN ORGANIZED HEALTH CARE EDUCATION/TRAINING PROGRAM | Admitting: STUDENT IN AN ORGANIZED HEALTH CARE EDUCATION/TRAINING PROGRAM
Payer: MEDICARE

## 2025-07-17 VITALS
SYSTOLIC BLOOD PRESSURE: 112 MMHG | BODY MASS INDEX: 25.46 KG/M2 | HEIGHT: 68 IN | RESPIRATION RATE: 16 BRPM | WEIGHT: 168 LBS | TEMPERATURE: 98 F | HEART RATE: 64 BPM | OXYGEN SATURATION: 95 % | DIASTOLIC BLOOD PRESSURE: 69 MMHG

## 2025-07-17 DIAGNOSIS — M25.551 RIGHT HIP PAIN: Primary | ICD-10-CM

## 2025-07-17 DIAGNOSIS — F33.1 MODERATE EPISODE OF RECURRENT MAJOR DEPRESSIVE DISORDER: ICD-10-CM

## 2025-07-17 DIAGNOSIS — M25.559 CHRONIC HIP PAIN: ICD-10-CM

## 2025-07-17 DIAGNOSIS — G89.29 CHRONIC HIP PAIN: ICD-10-CM

## 2025-07-17 PROCEDURE — 63600175 PHARM REV CODE 636 W HCPCS: Performed by: STUDENT IN AN ORGANIZED HEALTH CARE EDUCATION/TRAINING PROGRAM

## 2025-07-17 PROCEDURE — 25500020 PHARM REV CODE 255: Performed by: STUDENT IN AN ORGANIZED HEALTH CARE EDUCATION/TRAINING PROGRAM

## 2025-07-17 PROCEDURE — 20610 DRAIN/INJ JOINT/BURSA W/O US: CPT | Mod: RT | Performed by: STUDENT IN AN ORGANIZED HEALTH CARE EDUCATION/TRAINING PROGRAM

## 2025-07-17 PROCEDURE — 20610 DRAIN/INJ JOINT/BURSA W/O US: CPT | Mod: RT,,, | Performed by: STUDENT IN AN ORGANIZED HEALTH CARE EDUCATION/TRAINING PROGRAM

## 2025-07-17 RX ORDER — SELEGILINE 12 MG/24H
1 PATCH TRANSDERMAL DAILY
Qty: 30 PATCH | Refills: 11 | Status: SHIPPED | OUTPATIENT
Start: 2025-07-17 | End: 2026-07-17

## 2025-07-17 RX ORDER — TRIAMCINOLONE ACETONIDE 40 MG/ML
INJECTION, SUSPENSION INTRA-ARTICULAR; INTRAMUSCULAR
Status: DISCONTINUED | OUTPATIENT
Start: 2025-07-17 | End: 2025-07-17 | Stop reason: HOSPADM

## 2025-07-17 RX ORDER — LIDOCAINE HYDROCHLORIDE 20 MG/ML
INJECTION, SOLUTION EPIDURAL; INFILTRATION; INTRACAUDAL; PERINEURAL
Status: DISCONTINUED | OUTPATIENT
Start: 2025-07-17 | End: 2025-07-17 | Stop reason: HOSPADM

## 2025-07-17 RX ORDER — BUPIVACAINE HYDROCHLORIDE 2.5 MG/ML
INJECTION, SOLUTION EPIDURAL; INFILTRATION; INTRACAUDAL; PERINEURAL
Status: DISCONTINUED | OUTPATIENT
Start: 2025-07-17 | End: 2025-07-17 | Stop reason: HOSPADM

## 2025-07-17 NOTE — DISCHARGE SUMMARY
[facility]  Discharge Note  Short Stay    Procedure(s) (LRB):  Right hip injection - (Right)      OUTCOME: Patient tolerated treatment/procedure well without complication and is now ready for discharge.    DISPOSITION: Home or Self Care    FINAL DIAGNOSIS:  <principal problem not specified>    FOLLOWUP: In clinic    DISCHARGE INSTRUCTIONS:  No discharge procedures on file.     TIME SPENT ON DISCHARGE: 10 minutes

## 2025-07-17 NOTE — TELEPHONE ENCOUNTER
Refill Routing Note   Medication(s) are not appropriate for processing by [facility] for the following reason(s):        Outside of protocol    [facility] action(s):  Route               Appointments  past 12m or future 3m with PCP    Date Provider   Last Visit   6/30/2025 Haseeb Puentes MD   Next Visit   7/25/2025 Haseeb Puentes MD   ED visits in past 90 days: 0        Note composed:12:24 PM 07/17/2025

## 2025-07-17 NOTE — PLAN OF CARE
Raffy Rutherford has met all discharge criteria from Phase II. Vital Signs are stable, ambulating  without difficulty. Discharge instructions given, patient verbalized understanding. Discharged from facility via ambulation in stable condition.

## 2025-07-17 NOTE — DISCHARGE INSTRUCTIONS
[facility] [place]  [employer] service # [phone]  On-call pager for emergency# [phone]    Hip Injection POST-PROCEDURE INSTRUCTIONS:    Today you had an injection that included a steroid medications.  The steroid may or may not have been mixed with a local anesthetic when it was injected.   Your injection today was in your hip.  This is both a therapeutic and diagnostic procedure.  What this means is that the diagnostic portion helps give us an idea if the hip is your primary pain generator, and the therapeutic portion is to hopefully give you longer lasting relief  The DIAGNOSTIC portion will only last a few hours.  This is from the local anesthetic that was placed in your hip joint.  The pain relief that you get immediately after the injection (for several hours) is the pain that the hip is most likely responsible for.  If you do not get pain relief, then your hip is likely not the cause of your pain.  The THERAPEUTIC portion of the injection is from the steroid.  This typically takes 3-5 days to start working.  The pain relief from this will hopefully last several months.  You may get side effects from the steroid.  This is not uncommon.  Symptoms include: elevated blood sugar, elevated blood pressure, headache, flushing, nausea, insomnia.  These symptoms are transient and will resolve within 1-3 days.  If symptoms last longer than this please contact our office or head to the emergency room.  You may notice that your pain worsens for a short period of time after the numbing medicine wears off, this would not be unusual due to the pressure and trauma from the needle.      What you need to do:    Keep a record of your response to the injection you had today.    How much relief did you get?   When did the relief start and how long did it last?  Were you able to decrease the use of any of your pain medications?  Were you able to increase your level of  activity?  How long did the relief last?    What to watch out for:    If you experience any of the following symptoms after your procedure, please notify the messaging service immediately (see above for contact information):   fever (increased oral temperature)   bleeding or swelling at the injection site,    drainage, rash or redness at the injection site    possible signs of infection    increased pain at the injection site   worsening of your usual pain   severe headache   new or worsening numbness    new leg weakness, or    changes in bowel and/or bladder function: urinating or defecating on yourself and not knowing that you did it.    PLEASE FOLLOW ALL INSTRUCTIONS CAREFULLY     Do not engage in strenuous activity (e.g., lifting or pushing heavy objects or repeated bending) for 24 hours.     Do not take a bath, swim or use Jacuzzi for 24 hours after procedure. (A shower is fine).   Remove any Band-Aids when you get home.    Use cold/ice, as needed for comfort.  We recommend the use of cold therapy alternating on for 20 minutes, off for 20 minutes.    Do not apply direct heat (heating pad or heat packs) to the injection site for 24 hours.     Resume your usual medications, unless instructed otherwise by your Pain Physician.      IF AT ANY POINT YOU ARE VERY CONCERNED ABOUT YOUR SYMPTOMS, PLEASE GO TO THE EMERGENCY ROOM.    If you develop worsening pain, weakness, numbness, lose bowel or bladder control (i.e., having an accident where you did not even know you had to go to the bathroom and suddenly noticed you soiled yourself), saddle anesthesia (a loss of sensation restricted to the area of the buttocks, anus and between the legs -- i.e., those parts of your body that would touch a saddle if you were sitting on one) you need to go immediately to the emergency department for evaluation and  treatment.    ----------------------------------------------------------------------------------------------------------------------------------------------------------------  If you received Sedation please read the following instructions:  POST SEDATION INSTRUCTIONS    Today you received intravenous medication (also known as sedation) that was used to help you relax and/or decrease discomfort during your procedure. This medication will be acting in your body for the next 24 hours, so you might feel a little tired or sleepy. This feeling will slowly wear off.   Common side effects associated with these medications include: drowsiness, dizziness, sleepiness, confusion, feeling excited, difficulty remembering things, lack of steadiness with walking or balance, loss of fine muscle control, slowed reflexes, difficulty focusing, and blurred vision.  Some over-the-counter and prescription medications (e.g., muscle relaxants, opioids, mood-altering medications, sedatives/hypnotics, antihistamines) can interact with the intravenous medication you received and cause an increased risk of the side effects listed above in addition to other potentially life threatening side effects. Use extreme caution if you are taking such medications, and consult with your Pain Physician or prescribing physician if you have any questions.  For the next 12-24 hours:    DO NOT--Drive a car, operate machinery or power tools   DO NOT--Drink any alcoholic beverages (not even beer), they may dangerously increase the risk of side effects.    DO NOT--Make any important legal or business decisions or sign important documents.  We advise you to have someone to assist you at home. Move slowly and carefully. Do not make sudden changes in position. Be aware of dizziness or light-headedness and move accordingly.   If you seek medical treatment within 24 hours, let the nurse or doctor caring for you know that you have received the above medications. If you  have any questions or concerns related to your sedation or treatment today please contact us.

## 2025-07-17 NOTE — OP NOTE
Hip Joint Injection under Fluoroscopic Guidance    The procedure, risks, benefits, and options were discussed with the patient. There are no contraindications to the procedure. The patent expressed understanding and agreed to the procedure. Informed written consent was obtained prior to the start of the procedure and can be found in the patient's chart.    PATIENT NAME: Raffy Rutherford Jr.   MRN: [id number]     DATE OF PROCEDURE: 07/17/2025    PROCEDURE: Right Hip Joint Injection under Fluoroscopic Guidance    PRE-OP DIAGNOSIS: Right hip pain [M25.551]    POST-OP DIAGNOSIS: Right hip pain [M25.551]    PHYSICIAN: Herman Palomino DO    ASSISTANTS: none     MEDICATIONS INJECTED: Preservative-free Kenalog 40mg with 5cc of Bupivacine 0.25%     LOCAL ANESTHETIC INJECTED: Xylocaine 1%     SEDATION: None    ESTIMATED BLOOD LOSS: None    COMPLICATIONS: None    TECHNIQUE: Time-out was performed to identify the patient and procedure to be performed. With the patient laying in a supine position, the surgical area was prepped and draped in the usual sterile fashion using ChloraPrep and a fenestrated drape. The area overlying the hip joint was determined under fluoroscopy guidance. Skin anesthesia was achieved by injecting Lidocaine 2% over the injection site. A 22 gauge, 3.5 inch spinal quinke needle was introduced under fluoroscopy until the tip reached the greater trochanter. The tip of the needle was hinged cephalad from the greater trochanter into the joint space.  When the needle tip was in the appropriate position, and there was no blood aspiration, Contrast dye  Omnipaque (300mg/mL) was injected to confirm placement and there was no vascular runoff. 5 mL of the medication mixture listed above was injected slowly. The needles were removed and bleeding was nil. A sterile dressing was applied. No specimens collected. The patient tolerated the procedure well.    The patient was monitored after the procedure in the recovery  area. They were given post-procedure and discharge instructions to follow at home. The patient was discharged in a stable condition.      Herman Sung DO

## 2025-07-20 ENCOUNTER — PATIENT MESSAGE (OUTPATIENT)
Dept: INTERNAL MEDICINE | Facility: CLINIC | Age: 75
End: 2025-07-20
Payer: MEDICARE

## 2025-07-20 NOTE — PROCEDURES
Department of Neurology  Phone No: [phone], Fax: [phone]    Neurography & Electromyography Report        Full Name: Raffy Rutherford Gender: Male  Patient ID: [id number] YOB: 1950      Visit Date: 7/16/2025 9:04 AM  Age: 75 Years  Examining Physician: Myrtle Street MD, FAAN   Referring Physician: Myrtle Street MD, FAAN   Technician: KRISTINE Helton   Assisting Technician: KRISTINE Grigsby   Height: 5 feet 8 inch  Weight: 169 lbs        Raffy Rutherford [id number] 7/16/2025 9:04 AM     Reason for Referral:  Neuropathic symptoms of the extremities, lower > upper.  More recent symptoms of hand numbness and incoordination with fine motor movement (e.g., playing musical instruments, snapping his fingers on the left)    Relevant medical diagnoses:  Neuropathy, migraine, fibromyalgia, chronic low back pain, prostate cancer, and cervical spondylosis    Surgical procedures:  Bilateral CTR, left cubital tunnel release, left shoulder surgery, lumbar spine fusion, left hip replacement.             Raffy Rutherford [id number] 7/16/2025 9:04 AM       History and Examination:  Long history of neuropathy s/p previous genetic testing and NCS/EMG.   Many prior surgeries (noted above).  Wears AFO's legs (drop foot).     Summary:   Nerve conduction studies were performed on the median, ulnar, radial, peroneal, sural, and tibial nerves.  EMG was performed on select muscles of the upper and lower extremities.  No responses were obtained for the sural nerves.  No responses were obtained for the peroneal and tibial nerves using standard electrophysiologic protocol.  With modification, the left peroneal motor nerve was able to be elicited only with recording placement at a more proximal site (anterior tibialis).  The right peroneal motor nerve remained unelicited even with recording lead placed at a proximal site (anterior tibialis).  There were mildly prolonged peak latencies for the bilateral median and ulnar sensory  nerves at the wrist.  There was increased insertional activity and large amplitude motor unit potentials in L2-3 myotomes, right > left.  Neurogenic motor units were observed in the L4-5, L5-S1 myotomes.  Decreased recruitment of motor unit potentials was present in these muscles.  In the upper extremities, no active denervation was observed in any of the examined muscles.    Interpretation:      Severe polyneuropathy of the lower extremities.  This appears unchanged from previous nerve conduction studies.  Mild median and ulnar sensory nerve compression at the wrist (transcarpal studies), left > right.  Chronic lumbar radiculopathy with active denervation at L2-3, right > left.          Myrtle Street MD, FAAN  Neuromuscular Consultant  [facility]    Raffy Langey [id number] 7/16/2025 9:04 AM                  Sensory NCS      Nerve / Sites Rec. Site Segments Onset Lat Peak Lat Onset Sheng Temp. Amp Distance      ms ms m/s °C µV mm   L Median - Dig II (Antidromic)      Wrist Index Wrist - Index 3.07 3.91 45.6 33.4 12.8 140      Ref.  Ref. <=3.30 <=4.00   >=7.0    R Median - Dig II (Antidromic)      Wrist Index Wrist - Index 2.86 3.91 48.9 33 13.1 140      Ref.  Ref. <=3.30 <=4.00   >=7.0    L Ulnar - Dig V (Antidromic)      Wrist Dig V Wrist - Dig V 2.66 3.44 48.9 33.4 11.6 130      Ref.  Ref. <=3.10 <=4.00   >=5.0    R Ulnar - Dig V (Antidromic)      Wrist Dig V Wrist - Dig V 2.71 3.33 44.3 33 8.2 120      Ref.  Ref. <=3.10 <=4.00   >=5.0    L Radial - Superficial (Antidromic)      Forearm Wrist Forearm - Wrist 2.08 2.76 57.6 33.5 19.1 120      Ref.  Ref. <=2.20 <=2.80   >=7.0    L Sural - (Antidromic)      Calf Ankle Calf - Ankle NR NR NR 33.3       Ref.  Ref. <=3.60 <=4.50   >=4.0    R Sural - (Antidromic)      Calf Ankle Calf - Ankle NR NR NR 32.9       Ref.  Ref. <=3.60 <=4.50   >=4.0    L Median, Ulnar - Transcarpal comparison      Median Palm Wrist Median Palm - Wrist 2.40 3.18 45.9  33.4 25.5 110      Ulnar Palm Wrist Ulnar Palm - Wrist 2.19 2.81 50.3 33.4 9.0 110     Median Palm - Ulnar Palm         R Median, Ulnar - Transcarpal comparison      Median Palm Wrist Median Palm - Wrist 2.08 2.92 48.0 33 24.7 100      Ulnar Palm Wrist Ulnar Palm - Wrist 1.88 2.60 53.3 33.1 14.8 100       Motor NCS      Nerve / Sites Muscle Segments Latency Ref. Velocity Ref. Amplitude Ref. Temp. Dur. Distance      ms ms m/s m/s mV mV °C ms mm   L Median - APB      Wrist APB Wrist - APB 3.85 <=4.70   6.5 >=3.8 33.5 5.9 80      Elbow APB Elbow - Wrist 8.63  50 >=47 5.9  33.6 6.4 240   R Median - APB      Wrist APB Wrist - APB 3.29 <=4.70   5.8 >=3.8 33 6.0 80      Elbow APB Elbow - Wrist 7.90  52 >=47 5.7  33.1 6.7 240   L Ulnar - ADM      Wrist ADM Wrist - ADM 2.83 <=3.70   7.1 >=7.0 33.6 7.4 80      B.Elbow ADM B.Elbow - Wrist 6.81  53 >=52 6.4  33.5 7.8 210      A.Elbow ADM A.Elbow - B.Elbow 8.71  53 >=43 6.3  33.4 8.4 100     A.Elbow - Wrist       33.4     R Ulnar - ADM      Wrist ADM Wrist - ADM 2.71 <=3.70   7.6 >=7.0 32.9 7.2 80      B.Elbow ADM B.Elbow - Wrist 6.25   >=52 6.5  32.9 7.0       A.Elbow ADM A.Elbow - B.Elbow 8.40   >=43 5.9  33 6.9       Median Elbow ADM A.Elbow - Wrist 5.23    0.7  33 6.8      Median Elbow - ADM       32.9     L Peroneal - EDB      Ankle EDB Ankle - EDB NR <=6.50   NR >=1.1 33.1 NR 80   R Peroneal - EDB      Ankle EDB Ankle - EDB NR <=6.50   NR >=1.1 32.9 NR 80   L Tibial - AH      Ankle AH Ankle - AH NR <=6.10   NR >=1.1 33 NR 80   R Tibial - AH      Ankle AH Ankle - AH NR <=6.10   NR >=1.1 33 NR 80   L Peroneal - Tib Ant      Fib Head Tib Ant Fib Head - Tib Ant 2.13  38  3.0  33 12.6 80      Pop fossa Tib Ant Pop fossa - Fib Head 4.79  38  2.9  33 12.1 100   R Peroneal - Tib Ant      Fib Head Tib Ant Fib Head - Tib Ant       33        Pop fossa Tib Ant Pop fossa - Fib Head NR    NR  33 NR        F  Wave      Nerve F Latency Ref. M Latency F - M Lat    ms ms ms ms   L Median -  APB 30.2 <=31.5 3.7 26.5   L Ulnar - ADM 29.2 <=30.9 3.1 26.1   R Median - APB 28.0 <=31.5 3.5 24.5   R Ulnar - ADM 28.4 <=30.9 2.7 25.7       EMG Summary Table     Spontaneous Recruitment MUAP   Muscle Nerve Roots IA Fib PSW Fasc Other Pattern Amp Dur. PPP   Bilateral Adductor longus Obturator L2-L4 Inc None 2-3 None N N Incr inc 1+   L. Gastrocnemius (Lateral head) Tibial S1-S2 Inc None None None N N N N N   R. Gastrocnemius (Lateral head) Tibial S1-S2 Inc None None None N N N N N   L. Tibialis anterior Deep peroneal (Fibular) L4-L5 Inc None None None N N N N N   R. Tibialis anterior Deep peroneal (Fibular) L4-L5 Inc None None None N N N N N   L. Peroneus longus Peroneal L5-S1 Inc None None None N N N N N   R. Peroneus longus Peroneal L5-S1 Inc None None None N N N N N   L. Biceps brachii Musculocutaneous C5-C6 N None None None N N N N N   R. Biceps brachii Musculocutaneous C5-C6 N None None None N N N N N   L. Deltoid Axillary C5-C6 N None None None N N N N N   R. Deltoid Axillary C5-C6 N None None None N N N N N   L. Extensor digitorum communis Radial C7-C8 N None None None N N N N N   R. Extensor digitorum communis Radial C7-C8 N None None None N N N N N   L. FDI and flexor carpi ulnaris Ulnar C7-T1 Inc None None None N N Inc N N   R.FDI and flexor carpi ulnaris Ulnar C7-T1 Inc None None None N N Inc N N

## 2025-07-20 NOTE — PROCEDURES
Department of Neurology  Phone No: [phone], Fax: [phone]    Neurography & Electromyography Report        Full Name: Raffy Rutherford Gender: Male  Patient ID: [id number] YOB: 1950      Visit Date: 7/16/2025 9:04 AM  Age: 75 Years  Examining Physician: Myrtle Street MD, FAAN   Referring Physician: Myrtle Street MD, FAAN   Technician: KRISTINE Helton   Assisting Technician: KRISTINE Grigsby   Height: 5 feet 8 inch  Weight: 169 lbs        Raffy Rutherford [id number] 7/16/2025 9:04 AM     Reason for Referral:  Neuropathic symptoms of the extremities, lower > upper.  More recent symptoms of hand numbness and incoordination with fine motor movement (e.g., playing musical instruments, snapping his fingers on the left)    Relevant medical diagnoses:  Neuropathy, migraine, fibromyalgia, chronic low back pain, prostate cancer, and cervical spondylosis    Surgical procedures:  Bilateral CTR, left cubital tunnel release, left shoulder surgery, lumbar spine fusion, left hip replacement.             Raffy Rutherford [id number] 7/16/2025 9:04 AM       History and Examination:  Long history of neuropathy s/p previous genetic testing and NCS/EMG.   Many prior surgeries (noted above).  Wears AFO's legs (drop foot).     Summary:   Nerve conduction studies were performed on the median, ulnar, radial, peroneal, sural, and tibial nerves.    No responses were obtained for the sural nerves.  No responses were obtained for the peroneal and tibial nerves using standard electrophysiologic protocol.  With modification, the left peroneal motor nerve was able to be elicited only with recording placement at a more proximal site (anterior tibialis).  The right peroneal motor nerve remained unelicited even with recording lead placed at a proximal site (anterior tibialis).  There were mildly prolonged peak latencies for the bilateral median and ulnar sensory nerves at the wrist.    Interpretation:      Severe polyneuropathy of  the lower extremities.  This appears unchanged from previous nerve conduction studies.  Mild median and ulnar sensory nerve compression at the wrist (transcarpal studies), left > right.  Chronic lumbar radiculopathy with active denervation at L2-3, right > left.          Myrtle Street MD, FAAN  Neuromuscular Consultant  [facility]    Raffy Rutherford [id number] 7/16/2025 9:04 AM                  Sensory NCS      Nerve / Sites Rec. Site Segments Onset Lat Peak Lat Onset Sheng Temp. Amp Distance      ms ms m/s °C µV mm   L Median - Dig II (Antidromic)      Wrist Index Wrist - Index 3.07 3.91 45.6 33.4 12.8 140      Ref.  Ref. <=3.30 <=4.00   >=7.0    R Median - Dig II (Antidromic)      Wrist Index Wrist - Index 2.86 3.91 48.9 33 13.1 140      Ref.  Ref. <=3.30 <=4.00   >=7.0    L Ulnar - Dig V (Antidromic)      Wrist Dig V Wrist - Dig V 2.66 3.44 48.9 33.4 11.6 130      Ref.  Ref. <=3.10 <=4.00   >=5.0    R Ulnar - Dig V (Antidromic)      Wrist Dig V Wrist - Dig V 2.71 3.33 44.3 33 8.2 120      Ref.  Ref. <=3.10 <=4.00   >=5.0    L Radial - Superficial (Antidromic)      Forearm Wrist Forearm - Wrist 2.08 2.76 57.6 33.5 19.1 120      Ref.  Ref. <=2.20 <=2.80   >=7.0    L Sural - (Antidromic)      Calf Ankle Calf - Ankle NR NR NR 33.3       Ref.  Ref. <=3.60 <=4.50   >=4.0    R Sural - (Antidromic)      Calf Ankle Calf - Ankle NR NR NR 32.9       Ref.  Ref. <=3.60 <=4.50   >=4.0    L Median, Ulnar - Transcarpal comparison      Median Palm Wrist Median Palm - Wrist 2.40 3.18 45.9 33.4 25.5 110      Ulnar Palm Wrist Ulnar Palm - Wrist 2.19 2.81 50.3 33.4 9.0 110     Median Palm - Ulnar Palm         R Median, Ulnar - Transcarpal comparison      Median Palm Wrist Median Palm - Wrist 2.08 2.92 48.0 33 24.7 100      Ulnar Palm Wrist Ulnar Palm - Wrist 1.88 2.60 53.3 33.1 14.8 100       Motor NCS      Nerve / Sites Muscle Segments Latency Ref. Velocity Ref. Amplitude Ref. Temp. Dur. Distance      ms ms m/s  m/s mV mV °C ms mm   L Median - APB      Wrist APB Wrist - APB 3.85 <=4.70   6.5 >=3.8 33.5 5.9 80      Elbow APB Elbow - Wrist 8.63  50 >=47 5.9  33.6 6.4 240   R Median - APB      Wrist APB Wrist - APB 3.29 <=4.70   5.8 >=3.8 33 6.0 80      Elbow APB Elbow - Wrist 7.90  52 >=47 5.7  33.1 6.7 240   L Ulnar - ADM      Wrist ADM Wrist - ADM 2.83 <=3.70   7.1 >=7.0 33.6 7.4 80      B.Elbow ADM B.Elbow - Wrist 6.81  53 >=52 6.4  33.5 7.8 210      A.Elbow ADM A.Elbow - B.Elbow 8.71  53 >=43 6.3  33.4 8.4 100     A.Elbow - Wrist       33.4     R Ulnar - ADM      Wrist ADM Wrist - ADM 2.71 <=3.70   7.6 >=7.0 32.9 7.2 80      B.Elbow ADM B.Elbow - Wrist 6.25   >=52 6.5  32.9 7.0       A.Elbow ADM A.Elbow - B.Elbow 8.40   >=43 5.9  33 6.9       Median Elbow ADM A.Elbow - Wrist 5.23    0.7  33 6.8      Median Elbow - ADM       32.9     L Peroneal - EDB      Ankle EDB Ankle - EDB NR <=6.50   NR >=1.1 33.1 NR 80   R Peroneal - EDB      Ankle EDB Ankle - EDB NR <=6.50   NR >=1.1 32.9 NR 80   L Tibial - AH      Ankle AH Ankle - AH NR <=6.10   NR >=1.1 33 NR 80   R Tibial - AH      Ankle AH Ankle - AH NR <=6.10   NR >=1.1 33 NR 80   L Peroneal - Tib Ant      Fib Head Tib Ant Fib Head - Tib Ant 2.13  38  3.0  33 12.6 80      Pop fossa Tib Ant Pop fossa - Fib Head 4.79  38  2.9  33 12.1 100   R Peroneal - Tib Ant      Fib Head Tib Ant Fib Head - Tib Ant       33        Pop fossa Tib Ant Pop fossa - Fib Head NR    NR  33 NR        F  Wave      Nerve F Latency Ref. M Latency F - M Lat    ms ms ms ms   L Median - APB 30.2 <=31.5 3.7 26.5   L Ulnar - ADM 29.2 <=30.9 3.1 26.1   R Median - APB 28.0 <=31.5 3.5 24.5   R Ulnar - ADM 28.4 <=30.9 2.7 25.7       EMG Summary Table     Spontaneous Recruitment MUAP   Muscle Nerve Roots IA Fib PSW Fasc Other Pattern Amp Dur. PPP   Bilateral Adductor longus Obturator L2-L4 N None None None N N N N N   L. Gastrocnemius (Lateral head) Tibial S1-S2 N None None None N N N N N   R. Gastrocnemius  (Lateral head) Tibial S1-S2 N None None None N N N N N   L. Tibialis anterior Deep peroneal (Fibular) L4-L5 N None None None N N N N N   R. Tibialis anterior Deep peroneal (Fibular) L4-L5 N None None None N N N N N   L. Peroneus longus Peroneal L5-S1 N None None None N N N N N   R. Peroneus longus Peroneal L5-S1 N None None None N N N N N   L. Biceps brachii Musculocutaneous C5-C6 N None None None N N N N N   R. Biceps brachii Musculocutaneous C5-C6 N None None None N N N N N   L. Deltoid Axillary C5-C6 N None None None N N N N N   R. Deltoid Axillary C5-C6 N None None None N N N N N   L. Extensor digitorum communis Radial C7-C8 N None None None N N N N N   R. Extensor digitorum communis Radial C7-C8 N None None None N N N N N   L. Flexor carpi ulnaris Ulnar C7-T1 N None None None N N N N N   R. Flexor carpi ulnaris Ulnar C7-T1 N None None None N N N N N

## 2025-07-22 NOTE — TELEPHONE ENCOUNTER
Called pt to try rescheduled his Pro - Op appointment ----there was no answer left voice message with my name and number [phone] for PT TO CALL BACK

## 2025-07-25 ENCOUNTER — LAB VISIT (OUTPATIENT)
Dept: LAB | Facility: HOSPITAL | Age: 75
End: 2025-07-25
Payer: MEDICARE

## 2025-07-25 ENCOUNTER — OFFICE VISIT (OUTPATIENT)
Dept: INTERNAL MEDICINE | Facility: CLINIC | Age: 75
End: 2025-07-25
Payer: MEDICARE

## 2025-07-25 VITALS
WEIGHT: 167.13 LBS | OXYGEN SATURATION: 96 % | HEART RATE: 71 BPM | BODY MASS INDEX: 25.33 KG/M2 | SYSTOLIC BLOOD PRESSURE: 114 MMHG | DIASTOLIC BLOOD PRESSURE: 72 MMHG | HEIGHT: 68 IN

## 2025-07-25 DIAGNOSIS — M47.819 SPONDYLOSIS WITHOUT MYELOPATHY: ICD-10-CM

## 2025-07-25 DIAGNOSIS — M48.061 SPINAL STENOSIS, LUMBAR REGION, WITHOUT NEUROGENIC CLAUDICATION: ICD-10-CM

## 2025-07-25 DIAGNOSIS — F33.1 MDD (MAJOR DEPRESSIVE DISORDER), RECURRENT EPISODE, MODERATE: ICD-10-CM

## 2025-07-25 DIAGNOSIS — C61 PROSTATE CANCER: ICD-10-CM

## 2025-07-25 DIAGNOSIS — M21.372 FOOT DROP, BILATERAL: ICD-10-CM

## 2025-07-25 DIAGNOSIS — M21.371 FOOT DROP, BILATERAL: ICD-10-CM

## 2025-07-25 DIAGNOSIS — M51.16 LUMBAR DISC HERNIATION WITH RADICULOPATHY: ICD-10-CM

## 2025-07-25 DIAGNOSIS — F33.9 EPISODE OF RECURRENT MAJOR DEPRESSIVE DISORDER, UNSPECIFIED DEPRESSION EPISODE SEVERITY: ICD-10-CM

## 2025-07-25 DIAGNOSIS — Z01.810 PREOP CARDIOVASCULAR EXAM: Primary | ICD-10-CM

## 2025-07-25 DIAGNOSIS — K40.90 RIGHT INGUINAL HERNIA: ICD-10-CM

## 2025-07-25 LAB — PSA SERPL-MCNC: 12.71 NG/ML

## 2025-07-25 PROCEDURE — 36415 COLL VENOUS BLD VENIPUNCTURE: CPT

## 2025-07-25 PROCEDURE — 99999 PR PBB SHADOW E&M-EST. PATIENT-LVL V: CPT | Mod: PBBFAC,,, | Performed by: INTERNAL MEDICINE

## 2025-07-25 PROCEDURE — 84153 ASSAY OF PSA TOTAL: CPT

## 2025-07-25 RX ORDER — SELEGILINE 6 MG/24H
1 PATCH TRANSDERMAL DAILY
Qty: 30 PATCH | Refills: 0 | Status: SHIPPED | OUTPATIENT
Start: 2025-07-25 | End: 2026-07-25

## 2025-07-25 NOTE — PROGRESS NOTES
Subjective:       Patient ID: Raffy Rutherford Jr. is a 75 y.o. male.    Chief Complaint: Pre-op Exam    HPI: Patient seen for preop evaluation prior to right inguinal hernia surgery.  Patient with complex peripheral neuropathy, leg weakness and foot drop.  He has seen Neurology and they are working on therapy in possible treatments for his condition.  He would actually like to try pool therapy after he recuperates from his hernia surgery.  He is on an MAO I inhibitor and he would like to try reducing the dosage a little bit before anesthesia.  He asked me to send in a temporary supply of 6 mg ENSAM for the periop..  He says he has not had a problem in the past with this as long as anesthesia and everyone knows.  He denies any chest pain or shortness a breath.  No new GI or  complaints.  EKG and recent labs reviewed.      Review of Systems   Constitutional:  Negative for fever and night sweats.   Respiratory:  Negative for cough, chest tightness and shortness of breath.    Cardiovascular:  Negative for chest pain and leg swelling.   Gastrointestinal:         Right inguinal hernia     Musculoskeletal:  Positive for arthralgias, back pain, gait problem and leg pain.   Neurological:  Positive for weakness.           Past Medical History:   Diagnosis Date    Allergy     Arthritis     Back pain     after trauma beginning in 195    Cataract     Chronic pain     neck and left shoulder    Cluster headache 2013    Colon polyps 2007 2007-2019: TA x5, HP x3    Degenerative disc disease     Depression     Diverticulitis 12/2013    Dry eye syndrome     Fibromyalgia 2013    GERD (gastroesophageal reflux disease)     Hepatitis 1970's    A    History of prostate biopsy 2002    Hyperlipidemia     Joint pain     Prostate cancer 07/2021    PVD (posterior vitreous detachment)     Salzmann's nodular dystrophy of left eye     Sleep apnea     Thyroid nodule 07/16/2014    Trigeminal neuralgia of left side of face     Tubular adenoma of  colon 2007    5 removed [id number]    Visual disturbance 2012    problems after cataract surgery     Past Surgical History:   Procedure Laterality Date    ARTHROSCOPIC DEBRIDEMENT OF SHOULDER Left 04/19/2024    Procedure: DEBRIDEMENT, SHOULDER, ARTHROSCOPIC;  Surgeon: GEORGIANA Up MD;  Location: [facility] OR;  Service: Orthopedics;  Laterality: Left;  Regional w/o Catheter, Interscalene, 0.5% Marcaine Plain    ARTHROSCOPIC REPAIR OF ROTATOR CUFF OF SHOULDER Left 04/19/2024    Procedure: REPAIR, ROTATOR CUFF, ARTHROSCOPIC;  Surgeon: GEORGIANA Up MD;  Location: [facility] OR;  Service: Orthopedics;  Laterality: Left;    ARTHROSCOPY OF SHOULDER WITH DECOMPRESSION OF SUBACROMIAL SPACE Left 04/19/2024    Procedure: ARTHROSCOPY, SHOULDER, WITH SUBACROMIAL SPACE DECOMPRESSION;  Surgeon: GEORGIANA Up MD;  Location: [facility] OR;  Service: Orthopedics;  Laterality: Left;    ARTHROSCOPY,SHOULDER,WITH BICEPS TENODESIS Left 04/19/2024    Procedure: ARTHROSCOPY,SHOULDER,WITH BICEPS TENODESIS;  Surgeon: GEORGIANA Up MD;  Location: [facility] OR;  Service: Orthopedics;  Laterality: Left;    BACK SURGERY      CARPAL TUNNEL RELEASE Right 05/18/2021    Procedure: RELEASE, CARPAL TUNNEL;  Surgeon: Kaye Solis MD;  Location: [facility] OR;  Service: Orthopedics;  Laterality: Right;    CATARACT EXTRACTION W/  INTRAOCULAR LENS IMPLANT Bilateral     CHOLECYSTECTOMY      COLONOSCOPY N/A 12/17/2019    Normal - Repeat 5yrs    COLONOSCOPY  2007 2007 TA x2, 2011 TA x3, 2014 HP x3, 2019 normal    COLONOSCOPY N/A 2/24/2025    Procedure: COLONOSCOPY;  Surgeon: Durga Harvey MD;  Location: [facility] ([address] [place] [address]);  Service: Endoscopy;  Laterality: N/A;  11/26 ref by GARO Quick, Suflave, instructions handed to pt in ofc and portal. niranjan Garcia/pt wife Valarie Rutherford rescheduled to an earlier time/ prep inst given in office / Changed prep to miralax /referral JACK barlow  Pt rescheduled to due to new ins/pt moved procedure to      COSMETIC SURGERY   02/10/2015    Direct mid-forehead brow lift    COSMETIC SURGERY  02/10/2015    Bilateral upper lid blepahroplasty    CYSTOSCOPY WITH URETEROSCOPY, RETROGRADE PYELOGRAPHY, AND INSERTION OF STENT Left 08/19/2020    Procedure: CYSTOSCOPY, WITH RETROGRADE PYELOGRAM AND URETERAL STENT INSERTION;  Surgeon: Katelynn George MD;  Location: [facility];  Service: Urology;  Laterality: Left;    EPIDURAL STEROID INJECTION INTO LUMBAR SPINE Bilateral 3/10/2025    Procedure: Bilateral L2-3 TFESI;  Surgeon: Herman Palomino DO;  Location: [facility]IN [facility]EMENT;  Service: Pain Management;  Laterality: Bilateral;  no sed-no ac    ESOPHAGOGASTRODUODENOSCOPY N/A 12/17/2019    Procedure: ESOPHAGOGASTRODUODENOSCOPY (EGD);  Surgeon: Amadou Hardin MD;  Location: [facility] (4TH FLR);  Service: Endoscopy;  Laterality: N/A;    ESOPHAGOGASTRODUODENOSCOPY N/A 2/24/2025    Procedure: EGD (ESOPHAGOGASTRODUODENOSCOPY);  Surgeon: Durga Harvey MD;  [facility]  Service: Endoscopy;  Laterality: N/A;    EYE SURGERY      FUSION OF LUMBAR SPINE BY ANTERIOR APPROACH N/A 08/20/2020    Procedure: FUSION, SPINE, LUMBAR, ANTERIOR APPROACH L5-S1 ALIF Stand Alone;  Surgeon: Jason Caldwell MD;  Location: [facility];  Service: Neurosurgery;  Laterality: N/A;    HEMORRHOID SURGERY      with complication of chronic bleeding for 6 weeks     INJECTION OF JOINT Right 7/17/2025    Procedure: Right hip injection -;  Surgeon: Herman Palomino DO;  Location: [facility] PAIN MANAGEMENT;  Service: Pain Management;  Laterality: Right;  no sed - no ac    INJECTION OF STEROID Right 12/06/2018    Procedure: INJECTION, STEROID Right SI Joint Block and Steroid Injection;  Surgeon: Jason Caldwell MD;  Location: [facility] OR;  Service: Neurosurgery;  Laterality: Right;  Procedure: Right SI Joint Block and Steroid Injection  Surgery Time: 30 MIN  LOS: 0  Anesthesia: MAC  Radiology: C-arm  Bed: [place] [id number] Poster  Position: Prone    INJECTION OF STEROID Right  09/19/2019    Procedure: INJECTION, STEROID Procedure: Right SI joint block nd steroid injection;  Surgeon: Jason Caldwell MD;  Location: [facility] OR;  Service: Neurosurgery;  Laterality: Right;  Procedure: Right SI joint block nd steroid injection  Surgery Time: 30 mins  LOS:   Anesthesia: General MAC  Radiology:C-arm  Bed: Regular Bed  Position: Prone    INJECTION, SACROILIAC JOINT Bilateral 6/24/2025    Procedure: b/l SIJ inj;  Surgeon: Herman Palomino DO;  Location: [facility] PAIN MANAGEMENT;  Service: Pain Management;  Laterality: Bilateral;  - RN sed - no AC    IRRIGATION AND DEBRIDEMENT OF UPPER EXTREMITY Left 04/07/2022    Procedure: IRRIGATION AND DEBRIDEMENT, UPPER EXTREMITY;  Surgeon: Kaye Solis MD;  Location: [facility] OR;  Service: Orthopedics;  Laterality: Left;    JOINT REPLACEMENT      KNEE SURGERY      involving arthroscopic surgery to both knees    REPAIR OF EXTENSOR TENDON Left 04/07/2022    Procedure: REPAIR, TENDON, EXTENSOR thumb, EPB and EPL;  Surgeon: Kaye Solsi MD;  Location: [facility] OR;  Service: Orthopedics;  Laterality: Left;    SHOULDER SURGERY      SINUS SURGERY      left molar and sinus surgery for trigeminal neuralgia    SPINAL FUSION  06/22/2015    L3-L5 XLIF/TANA    TOTAL HIP ARTHROPLASTY  04/2012    Pt states he had total hip replacement on his left hip.    ULNAR NERVE TRANSPOSITION Left 12/16/2020    Procedure: TRANSPOSITION, NERVE, ULNAR - left carpal and cubital tunnel releases;  Surgeon: Addi Bauer MD;  Location: [facility];  Service: Orthopedics;  Laterality: Left;      Problem List[1]     Objective:      Physical Exam  Constitutional:       Appearance: Normal appearance.   Cardiovascular:      Rate and Rhythm: Normal rate and regular rhythm.      Heart sounds: No murmur heard.  Pulmonary:      Effort: Pulmonary effort is normal. No respiratory distress.      Breath sounds: No wheezing or rhonchi.   Abdominal:      Tenderness: There is no abdominal tenderness.       Hernia: A hernia is present.   Neurological:      Mental Status: He is alert.      Motor: Weakness present.      Coordination: Coordination abnormal.      Gait: Gait abnormal.   Psychiatric:         Mood and Affect: Mood normal.         Thought Content: Thought content normal.         Assessment:       Problem List Items Addressed This Visit          Neuro    Spondylosis without myelopathy    Relevant Orders    Ambulatory Referral/Consult to Physical Therapy    Spinal stenosis, lumbar region, without neurogenic claudication    Relevant Orders    Ambulatory Referral/Consult to Physical Therapy    Lumbar disc herniation with radiculopathy    Foot drop, bilateral    Relevant Orders    Ambulatory Referral/Consult to Physical Therapy       Psychiatric    Depression    Relevant Medications    selegiline (EMSAM) 6 mg/24 hr    MDD (major depressive disorder), recurrent episode, moderate    Relevant Medications    selegiline (EMSAM) 6 mg/24 hr     Other Visit Diagnoses         Preop cardiovascular exam    -  Primary      Right inguinal hernia                Plan:         Raffy was seen today for pre-op exam.    Diagnoses and all orders for this visit:    Preop cardiovascular exam    Right inguinal hernia    Lumbar disc herniation with radiculopathy    MDD (major depressive disorder), recurrent episode, moderate  -     selegiline (EMSAM) 6 mg/24 hr; Place 1 patch onto the skin once daily.    Episode of recurrent major depressive disorder, unspecified depression episode severity  -     selegiline (EMSAM) 6 mg/24 hr; Place 1 patch onto the skin once daily.    Spondylosis without myelopathy  -     Ambulatory Referral/Consult to Physical Therapy    Foot drop, bilateral  -     Ambulatory Referral/Consult to Physical Therapy    Spinal stenosis, lumbar region, without neurogenic claudication  -     Ambulatory Referral/Consult to Physical Therapy       Patient is cleared for anesthesia for hernia surgery      Of note, Pt is on an  "IVANI - please note for anesthesia and postop purposes.   Does not tolerate Oxycodone, he has successfully been given Dilaudid for postop pain control in the past if appropriate.     As this patient's PCP, I am actively managing and/or treating their chronic medical conditions including peripheral neuropathy and have been for at least 1 year. This includes, but is not limited to, medication management, coordination of care, documentation review from their specialists and labs/imaging review where pertinent.      Portions of this note may have been created with voice recognition software. Occasional "wrong-word" or "sound-a-like" substitutions may have occurred due to the inherent limitations of voice recognition software. Please, read the note carefully and recognize, using context, where substitutions have occurred.         [1]   Patient Active Problem List  Diagnosis    Depression    Hyperlipidemia    Chronic pain    Adenomatous polyp    GERD (gastroesophageal reflux disease)    Back pain    Sleep apnea    Visual disturbances    Spondylosis without myelopathy    Degeneration of lumbar or lumbosacral intervertebral disc    Spinal stenosis, lumbar region, without neurogenic claudication    Thoracic or lumbosacral neuritis or radiculitis, unspecified    Displacement of lumbar intervertebral disc without myelopathy    Acquired spondylolisthesis    Lumbago    Status post cataract extraction and insertion of intraocular lens - Both Eyes    Fibromyalgia    OP (osteoporosis)    Compression fracture of T12 vertebra    Diverticulitis large intestine    Osteoarthritis    Lower back pain    Lower extremity pain    Muscle weakness    Range of motion deficit    Facet joint disease of cervical region    Occipital neuralgia    Chronic migraine without aura, with intractable migraine, so stated, with status migrainosus    Cervical radiculopathy    Paroxysmal hemicrania    Sciatic nerve pain    Chronic LBP    DJD (degenerative joint " disease) of lumbar spine    Chronic neck pain    Right lumbar radiculopathy    Thyroid nodule    Carpal tunnel syndrome of right wrist    Paresthesia    Degenerative disc disease    S/P lumbar spinal fusion    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    Headache around the eyes    Closed fracture of left distal radius    Bilateral foot-drop    Idiopathic peripheral neuropathy    Sacroiliac joint dysfunction of right side    SI (sacroiliac) joint dysfunction    Episodic migraine without status migrainosus, not intractable    Lumbar disc herniation with radiculopathy    Anxiety about health    MDD (major depressive disorder), recurrent episode, moderate    Anxiety    History of colon polyps    Iron deficiency anemia    DDD (degenerative disc disease), lumbosacral    Spondylolisthesis at L5-S1 level    CTS (carpal tunnel syndrome)    Pain in left hand    Right carpal tunnel syndrome    Decreased range of motion of neck    Poor posture    Decreased strength of upper extremity    Prostate cancer    Impaired gait    Balance problems    Genetic disorder    Bilateral carotid artery stenosis    Other specified diseases of spinal cord    Chronic pain of left hand    Pain in thumb joint with movement of left hand    Decreased activities of daily living (ADL)    Aortic atherosclerosis    Chronic migraine without aura without status migrainosus, not intractable    Chronic left shoulder pain    Incomplete tear of left rotator cuff    Primary osteoarthritis, left shoulder    Pain    Decreased range of motion of finger of right hand    Posterior vitreous detachment of both eyes    Pseudophakia of both eyes    Dry eye syndrome of both eyes    Meibomian gland dysfunction (MGD) of both eyes    Left wrist pain    Neuropathy    Foot drop, bilateral    Low back pain    Chronic left-sided low back pain with left-sided sciatica

## 2025-07-28 ENCOUNTER — OFFICE VISIT (OUTPATIENT)
Dept: RADIATION ONCOLOGY | Facility: CLINIC | Age: 75
End: 2025-07-28
Attending: RADIOLOGY
Payer: MEDICARE

## 2025-07-28 DIAGNOSIS — C61 PROSTATE CANCER: Primary | ICD-10-CM

## 2025-07-28 PROCEDURE — 1160F RVW MEDS BY RX/DR IN RCRD: CPT | Mod: CPTII,95,, | Performed by: RADIOLOGY

## 2025-07-28 PROCEDURE — 98004 SYNCH AUDIO-VIDEO EST SF 10: CPT | Mod: 95,,, | Performed by: RADIOLOGY

## 2025-07-28 PROCEDURE — 1157F ADVNC CARE PLAN IN RCRD: CPT | Mod: CPTII,95,, | Performed by: RADIOLOGY

## 2025-07-28 PROCEDURE — 3044F HG A1C LEVEL LT 7.0%: CPT | Mod: CPTII,95,, | Performed by: RADIOLOGY

## 2025-07-28 PROCEDURE — 1159F MED LIST DOCD IN RCRD: CPT | Mod: CPTII,95,, | Performed by: RADIOLOGY

## 2025-07-28 NOTE — PROGRESS NOTES
The patient location is: home  The chief complaint leading to consultation is: prostate cancer     Visit type: audiovisual    Face to Face time with patient: 12  minutes   20 minutes of total time spent on the encounter, which includes face to face time and non-face to face time preparing to see the patient (eg, review of tests), Obtaining and/or reviewing separately obtained history, Documenting clinical information in the electronic or other health record, Independently interpreting results (not separately reported) and communicating results to the patient/family/caregiver, or Care coordination (not separately reported).     Each patient to whom he or she provides medical services by telemedicine is:  (1) informed of the relationship between the physician and patient and the respective role of any other health care provider with respect to management of the patient; and (2) notified that he or she may decline to receive medical services by telemedicine and may withdraw from such care at any time.    Notes:  [facility] [place] - Radiation Oncology     Patient ID: Raffy Rutherford Jr. is a 75 y.o. male.    Chief Complaint: No chief complaint on file.      Mr. Rutherford has a history clinical stage IIB (T1c, N0, M0, PSA < 10, GG2) prostate cancer.  He presented for further evaluation after repeat PSA on 5/25/21 returned at 8.2 ng/ml.  MRI on 5/25/21 revealed a 46.7 cc prostate with a 1.3 cm Pi-RADS 4 lesion in the Rt. mid gland with no extraprostatic extension.  Biopsies on 6/15/21 revealed Union 7 (3+4) adenocarcinoma involving 29% of 4 of 6 cores from the targeted lesion in the Rt. apex.  The Union pattern 4 accounted for 10 - 20% of the tumor.  There was a small focus of atypical glands at the Lt. apex but the remaining biopsies revealed benign prostatic tissue.  Polaris recurrence score returned at 3.2 which is at the borderline of active surveillance and single modality treatment.   The patient  has continued with active surveillance. MRI in September of 2024 revealed stable PI-RADS 4 lesions in the anterior peripheral and transition zones.  Lesions abut each other and may be contiguous.  Repeat biopsies in February of 2025 revealed Daniel 7 (3+4) adenocarcinoma involving 10% of the cores from the Rt. apex / target #`1 and 20% of the cores from the Rt. mid gland / target #2.  The Daniel grade 4 accounted for 10 - 30% of the tumor.  The remaining cores were benign.  Today the patient states he feels well.  No significant  complaints.       Review of Systems   Constitutional:  Negative for activity change, appetite change, chills and fatigue.   Genitourinary:  Negative for bladder incontinence, difficulty urinating, dysuria, frequency and hematuria.     Physical Exam  Constitutional:       General: He is not in acute distress.     Appearance: Normal appearance.   Neurological:      Mental Status: He is alert and oriented to person, place, and time.   Psychiatric:         Mood and Affect: Mood normal.         Judgment: Judgment normal.        Latest Reference Range & Units 06/12/24 09:40 01/16/25 13:00 07/25/25 12:30   Prostate Specific Antigen <=4.00 ng/mL   12.71 (H)   PSA Diagnostic 0.00 - 4.00 ng/mL 9.0 (H) 10.1 (H)    (H): Data is abnormally high       Assessment and Plan    Prostate cancer - discussed his current PSA results.   Plan follow up in 3 - 4 months with PSA

## 2025-07-29 ENCOUNTER — HOSPITAL ENCOUNTER (OUTPATIENT)
Dept: RADIOLOGY | Facility: HOSPITAL | Age: 75
Discharge: HOME | End: 2025-07-29
Attending: INTERNAL MEDICINE
Payer: MEDICARE

## 2025-07-29 DIAGNOSIS — E04.1 THYROID NODULE: ICD-10-CM

## 2025-07-29 PROCEDURE — 76536 US EXAM OF HEAD AND NECK: CPT | Mod: TC

## 2025-07-29 PROCEDURE — 76536 US EXAM OF HEAD AND NECK: CPT | Mod: 26,,, | Performed by: STUDENT IN AN ORGANIZED HEALTH CARE EDUCATION/TRAINING PROGRAM

## 2025-07-30 ENCOUNTER — PATIENT MESSAGE (OUTPATIENT)
Dept: INTERNAL MEDICINE | Facility: CLINIC | Age: 75
End: 2025-07-30
Payer: MEDICARE

## 2025-07-30 ENCOUNTER — PATIENT MESSAGE (OUTPATIENT)
Dept: SURGERY | Facility: CLINIC | Age: 75
End: 2025-07-30
Payer: MEDICARE

## 2025-07-30 ENCOUNTER — ANESTHESIA EVENT (OUTPATIENT)
Dept: SURGERY | Facility: HOSPITAL | Age: 75
End: 2025-07-30
Payer: MEDICARE

## 2025-07-30 ENCOUNTER — TELEPHONE (OUTPATIENT)
Dept: SURGERY | Facility: CLINIC | Age: 75
End: 2025-07-30
Payer: MEDICARE

## 2025-07-30 NOTE — PRE-PROCEDURE INSTRUCTIONS
PreOp Instructions given:   - Verbal medication information (what to hold and what to take)   - NPO guidelines   NO SOLID FOOD, MILK OR MILK PRODUCTS 8 HOURS PRIOR TO SX START TIME.  YOU MAY ONLY HAVE SIPS OF WATER WITH MORNING MEDICATIONS (1) HOUR PRIOR TO ARRIVAL TO HOSPITAL.  OR - Follow instructions given/Surgeon's office   - Arrival place directions given; time to be given the day before procedure by the   Surgeon's Office DOSC  - Bathing with antibacterial soap   - Don't wear any jewelry or bring any valuables AM of surgery   - No makeup or moisturizer to face   - No perfume/cologne, powder, lotions or aftershave   Pt. verbalized understanding.     Pt denies any h/o Anesthesia/Sedation complications or side effects.    Patient does not know arrival time.  Explained that this information comes from the surgeon's office and if they haven't heard from them by 2 or 3 pm to call the office.  Patient stated an understanding.     Copied from PCP - Preop Visit - 7/25/2025 Dr. STACEY Puentes    Of note, Pt is on an MAOI - please note for anesthesia and postop purposes.   Does not tolerate Oxycodone, he has successfully been given Dilaudid for postop pain control in the past if appropriate.

## 2025-07-30 NOTE — TELEPHONE ENCOUNTER
LOV with Haseeb Puentes MD , 7/25/2025    Pt had thyroid US yesterday, results show 3 nodules, 2 of the nodules meet criteria for FNA. Pt is scheduled for sx tomorrow and asking if this will impact his sx in any way?

## 2025-07-30 NOTE — ANESTHESIA PREPROCEDURE EVALUATION
[facility]  Anesthesia Pre-Operative Evaluation         Patient Name: Raffy Rutherford Jr.  YOB: 1950  MRN: [id number]    SUBJECTIVE:     Pre-operative evaluation for Procedure(s) (LRB):  REPAIR, INGUINAL HERNIA, LAPAROSCOPIC, RIGHT w/ mesh, eval left side (Right)     07/30/2025    Raffy Rutherford Jr. is a 75 y.o. male w/ a significant PMHx of GERD, WINNIE, fibromyalgia, osteoarthritis, facet joint disease of the C-spine, chronic migraines, YARI, anxiety, depression, thyroid nodules, and bilateral carotid artery stenosis (1-39% bilaterally).      Patient now presents for the above procedure(s).    EKG with sinus bradycardia with RBBB and sinus arrhythmia.     Echo Summary  No results found for this or any previous visit.       Prev airway:    Intubation     Date/Time: 4/19/2024 9:43 AM     Performed by: Cecilia Edge CRNA  Authorized by: Karlie Tom MD    Intubation:     Induction:  Intravenous    Intubated:  Postinduction    Mask Ventilation:  Easy mask    Attempts:  1    Attempted By:  Staff anesthesiologist    Method of Intubation:  Video laryngoscopy    Blade:  Vazquez 3    Laryngeal View Grade: Grade I - full view of cords      Difficult Airway Encountered?: No      Complications:  None    Airway Device:  Oral endotracheal tube    Airway Device Size:  7.5    Style/Cuff Inflation:  Cuffed (inflated to minimal occlusive pressure)    Inflation Amount (mL):  6    Tube secured:  22    Secured at:  The lips    Placement Verified By:  Capnometry    Complicating Factors:  None    Findings Post-Intubation:  BS equal bilateral and atraumatic/condition of teeth unchanged          Problem List[1]    Review of patient's allergies indicates:   Allergen Reactions    Alphagan [brimonidine]      Patient taking MASO-B Selective Inhibitor Selegiline (Emsam)    Coumadin [warfarin]      itch    Oxycodone      hiccups       Current Inpatient Medications:   onabotulinumtoxina  200 Units Intramuscular q12  weeks       Medications Ordered Prior to Encounter[2]    Past Surgical History:   Procedure Laterality Date    ARTHROSCOPIC DEBRIDEMENT OF SHOULDER Left 04/19/2024    Procedure: DEBRIDEMENT, SHOULDER, ARTHROSCOPIC;  Surgeon: GEORGIANA Up MD;  Location: [facility] OR;  Service: Orthopedics;  Laterality: Left;  Regional w/o Catheter, Interscalene, 0.5% Marcaine Plain    ARTHROSCOPIC REPAIR OF ROTATOR CUFF OF SHOULDER Left 04/19/2024    Procedure: REPAIR, ROTATOR CUFF, ARTHROSCOPIC;  Surgeon: GEORGIANA Up MD;  Location: [facility] OR;  Service: Orthopedics;  Laterality: Left;    ARTHROSCOPY OF SHOULDER WITH DECOMPRESSION OF SUBACROMIAL SPACE Left 04/19/2024    Procedure: ARTHROSCOPY, SHOULDER, WITH SUBACROMIAL SPACE DECOMPRESSION;  Surgeon: GEORGIANA Up MD;  Location: [facility] OR;  Service: Orthopedics;  Laterality: Left;    ARTHROSCOPY,SHOULDER,WITH BICEPS TENODESIS Left 04/19/2024    Procedure: ARTHROSCOPY,SHOULDER,WITH BICEPS TENODESIS;  Surgeon: GEORGIANA Up MD;  Location: [facility] OR;  Service: Orthopedics;  Laterality: Left;    BACK SURGERY      CARPAL TUNNEL RELEASE Right 05/18/2021    Procedure: RELEASE, CARPAL TUNNEL;  Surgeon: Kaye Soils MD;  Location: [facility] OR;  Service: Orthopedics;  Laterality: Right;    CATARACT EXTRACTION W/  INTRAOCULAR LENS IMPLANT Bilateral     CHOLECYSTECTOMY      COLONOSCOPY N/A 12/17/2019    Normal - Repeat 5yrs    COLONOSCOPY  2007 2007 TA x2, 2011 TA x3, 2014 HP x3, 2019 normal    COLONOSCOPY N/A 2/24/2025    Procedure: COLONOSCOPY;  Surgeon: Durga Harvey MD;  Location: [facility] ([address] [place] [address]);  Service: Endoscopy;  Laterality: N/A;  11/26 ref by GARO Quick, Suflave, instructions handed to pt in ofc and portal. [email or web address]  Jm/pt wife Valarie Rutherford rescheduled to an earlier time/ prep inst given in office / Changed prep to miralax /referral JACK barlow  Pt rescheduled to due to new ins/pt moved procedure to      COSMETIC SURGERY  02/10/2015    Direct mid-forehead brow lift     COSMETIC SURGERY  02/10/2015    Bilateral upper lid blepahroplasty    CYSTOSCOPY WITH URETEROSCOPY, RETROGRADE PYELOGRAPHY, AND INSERTION OF STENT Left 08/19/2020    Procedure: CYSTOSCOPY, WITH RETROGRADE PYELOGRAM AND URETERAL STENT INSERTION;  Surgeon: Katelynn George MD;  Location: [facility];  Service: Urology;  Laterality: Left;    EPIDURAL STEROID INJECTION INTO LUMBAR SPINE Bilateral 3/10/2025    Procedure: Bilateral L2-3 TFESI;  Surgeon: Herman Palomino DO;  Location: [facility];  Service: Pain Management;  Laterality: Bilateral;  no sed-no ac    ESOPHAGOGASTRODUODENOSCOPY N/A 12/17/2019    Procedure: ESOPHAGOGASTRODUODENOSCOPY (EGD);  Surgeon: Amadou Hardin MD;  Location: [facility] END[facility] (4TH FLR);  Service: Endoscopy;  Laterality: N/A;    ESOPHAGOGASTRODUODENOSCOPY N/A 2/24/2025    Procedure: EGD (ESOPHAGOGASTRODUODENOSCOPY);  Surgeon: Durga Harvey MD;  Location: [facility] (4TH FLR);  Service: Endoscopy;  Laterality: N/A;    EYE SURGERY      FUSION OF LUMBAR SPINE BY ANTERIOR APPROACH N/A 08/20/2020    Procedure: FUSION, SPINE, LUMBAR, ANTERIOR APPROACH L5-S1 ALIF Stand Alone;  Surgeon: Jason Caldwell MD;  Location: [facility];  Service: Neurosurgery;  Laterality: N/A;    HEMORRHOID SURGERY      with complication of chronic bleeding for 6 weeks     INJECTION OF JOINT Right 7/17/2025    Procedure: Right hip injection -;  Surgeon: Herman Palomino DO;  Location: [facility];  Service: Pain Management;  Laterality: Right;  no sed - no ac    INJECTION OF STEROID Right 12/06/2018    Procedure: INJECTION, STEROID Right SI Joint Block and Steroid Injection;  Surgeon: Jason Caldwell MD;  Location: [facility] OR;  Service: Neurosurgery;  Laterality: Right;  Procedure: Right SI Joint Block and Steroid Injection  Surgery Time: 30 MIN  LOS: 0  Anesthesia: MAC  Radiology: C-arm  Bed: [place] [id number] Poster  Position: Prone    INJECTION OF STEROID Right 09/19/2019    Procedure: INJECTION, STEROID  Procedure: Right SI joint block nd steroid injection;  Surgeon: Jason Caldwell MD;  Location: [facility] OR;  Service: Neurosurgery;  Laterality: Right;  Procedure: Right SI joint block nd steroid injection  Surgery Time: 30 mins  LOS:   Anesthesia: General MAC  Radiology:C-arm  Bed: Regular Bed  Position: Prone    INJECTION, SACROILIAC JOINT Bilateral 6/24/2025    Procedure: b/l SIJ inj;  Surgeon: Herman Palomino DO;  Location: [facility]IN [facility];  Service: Pain Management;  Laterality: Bilateral;  - RN sed - no AC    IRRIGATION AND DEBRIDEMENT OF UPPER EXTREMITY Left 04/07/2022    Procedure: IRRIGATION AND DEBRIDEMENT, UPPER EXTREMITY;  Surgeon: Kaye Solis MD;  Location: [facility] OR;  Service: Orthopedics;  Laterality: Left;    JOINT REPLACEMENT      KNEE SURGERY      involving arthroscopic surgery to both knees    REPAIR OF EXTENSOR TENDON Left 04/07/2022    Procedure: REPAIR, TENDON, EXTENSOR thumb, EPB and EPL;  Surgeon: Kaye Solis MD;  Location: [facility] OR;  Service: Orthopedics;  Laterality: Left;    SHOULDER SURGERY      SINUS SURGERY      left molar and sinus surgery for trigeminal neuralgia    SPINAL FUSION  06/22/2015    L3-L5 XLIF/TANA    TOTAL HIP ARTHROPLASTY  04/2012    Pt states he had total hip replacement on his left hip.    ULNAR NERVE TRANSPOSITION Left 12/16/2020    Procedure: TRANSPOSITION, NERVE, ULNAR - left carpal and cubital tunnel releases;  Surgeon: Addi Bauer MD;  Location: [facility];  Service: Orthopedics;  Laterality: Left;       Social History:  Tobacco Use: Low Risk  (7/28/2025)    Patient History     Smoking Tobacco Use: Never     Smokeless Tobacco Use: Never     Passive Exposure: Not on file      Alcohol Use: Not At Risk (6/11/2025)    AUDIT-C     Frequency of Alcohol Consumption: 2-3 times a week     Average Number of Drinks: 1 or 2     Frequency of Binge Drinking: Never        OBJECTIVE:     Vital Signs Range (Last 24H):         Significant Labs:  Lab Results    Component Value Date    WBC 7.27 07/01/2025    HGB 15.0 07/01/2025    HCT 44.2 07/01/2025     07/01/2025    CHOL 195 08/14/2024    TRIG 125 08/14/2024    HDL 60 08/14/2024    ALT 37 07/01/2025    AST 27 07/01/2025     07/01/2025    K 4.5 07/01/2025     07/01/2025    CREATININE 0.8 07/01/2025    BUN 17 07/01/2025    CO2 30 (H) 07/01/2025    TSH 0.588 07/01/2025    PSA 12.71 (H) 07/25/2025    INR 1.0 08/03/2020    HGBA1C 5.3 07/01/2025       Diagnostic Studies: No relevant studies.    EKG:   Results for orders placed or performed during the hospital encounter of 07/16/25   EKG 12-lead    Collection Time: 07/16/25 10:56 AM   Result Value Ref Range    QRS Duration 144 ms    OHS QTC Calculation 412 ms    Narrative    Test Reason : Z01.818,K40.90,    Vent. Rate :  58 BPM     Atrial Rate :  58 BPM     P-R Int : 168 ms          QRS Dur : 144 ms      QT Int : 420 ms       P-R-T Axes :  63 -48  24 degrees    QTcB Int : 412 ms    Sinus bradycardia with marked sinus arrythmia  Left anterior fascicular block  Right bundle branch block   Bifascicular block   Abnormal ECG  When compared with ECG of 05-Apr-2024 13:34,  No significant change was found  Confirmed by Rick Ochoa ([id number]) on 7/16/2025 11:40:08 AM    Referred By: DIDIER SMITH           Confirmed By: Rick Ochoa       2D ECHO:  TTE:  No results found. However, due to the size of the patient record, not all encounters were searched. Please check Results Review for a complete set of results.    NOEL:  No results found. However, due to the size of the patient record, not all encounters were searched. Please check Results Review for a complete set of results.    ASSESSMENT/PLAN:         Pre-op Assessment    I have reviewed the Patient Summary Reports.     I have reviewed the Nursing Notes.    I have reviewed the Medications.     Review of Systems  Anesthesia Hx:   History of prior surgery of interest to airway management or planning:             Denies Personal Hx of Anesthesia complications.                    Hematology/Oncology:       -- Denies Anemia:                                  Pulmonary:        Sleep Apnea     Obstructive Sleep Apnea (WINNIE).           Hepatic/GI:     GERD Denies Liver Disease.  Denies Hepatitis.       Gerd       Liver Disease, Hepatitis        Musculoskeletal:  Arthritis        Arthritis     Spine Disorders: lumbar, cervical and thoracic Degenerative disease, Disc disease and Chronic Pain           Neurological:    Neuromuscular Disease,  Headaches      Dx of Headaches   Arthritis                         Neuromuscular Disease   Psych:  Psychiatric History anxiety depression                   Anesthesia Plan  Type of Anesthesia, risks & benefits discussed:    Anesthesia Type: Gen ETT  Intra-op Monitoring Plan: Standard ASA Monitors  Post Op Pain Control Plan: multimodal analgesia and IV/PO Opioids PRN  Induction:  IV  Airway Plan: Direct and Video, Post-Induction  Informed Consent: Informed consent signed with the Patient and all parties understand the risks and agree with anesthesia plan.  All questions answered.   ASA Score: 3  Day of Surgery Review of History & Physical: H&P Update referred to the surgeon/provider.    Ready For Surgery From Anesthesia Perspective.     .  Copied from PCP - Preop Visit - 7/25/2025 Dr. STACEY Puentes    Of note, Pt is on an MAOI - please note for anesthesia and postop purposes.   Does not tolerate Oxycodone, he has successfully been given Dilaudid for postop pain control in the past if appropriate.          [1]   Patient Active Problem List  Diagnosis    Depression    Hyperlipidemia    Chronic pain    Adenomatous polyp    GERD (gastroesophageal reflux disease)    Back pain    Sleep apnea    Visual disturbances    Spondylosis without myelopathy    Degeneration of lumbar or lumbosacral intervertebral disc    Spinal stenosis, lumbar region, without neurogenic claudication    Thoracic or  lumbosacral neuritis or radiculitis, unspecified    Displacement of lumbar intervertebral disc without myelopathy    Acquired spondylolisthesis    Lumbago    Status post cataract extraction and insertion of intraocular lens - Both Eyes    Fibromyalgia    OP (osteoporosis)    Compression fracture of T12 vertebra    Diverticulitis large intestine    Osteoarthritis    Lower back pain    Lower extremity pain    Muscle weakness    Range of motion deficit    Facet joint disease of cervical region    Occipital neuralgia    Chronic migraine without aura, with intractable migraine, so stated, with status migrainosus    Cervical radiculopathy    Paroxysmal hemicrania    Sciatic nerve pain    Chronic LBP    DJD (degenerative joint disease) of lumbar spine    Chronic neck pain    Right lumbar radiculopathy    Thyroid nodule    Carpal tunnel syndrome of right wrist    Paresthesia    Degenerative disc disease    S/P lumbar spinal fusion    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    Headache around the eyes    Closed fracture of left distal radius    Bilateral foot-drop    Idiopathic peripheral neuropathy    Sacroiliac joint dysfunction of right side    SI (sacroiliac) joint dysfunction    Episodic migraine without status migrainosus, not intractable    Lumbar disc herniation with radiculopathy    Anxiety about health    MDD (major depressive disorder), recurrent episode, moderate    Anxiety    History of colon polyps    Iron deficiency anemia    DDD (degenerative disc disease), lumbosacral    Spondylolisthesis at L5-S1 level    CTS (carpal tunnel syndrome)    Pain in left hand    Right carpal tunnel syndrome    Decreased range of motion of neck    Poor posture    Decreased strength of upper extremity    Prostate cancer    Impaired gait    Balance problems    Genetic disorder    Bilateral carotid artery stenosis    Other specified diseases of spinal cord    Chronic pain of left hand    Pain in thumb joint with  movement of left hand    Decreased activities of daily living (ADL)    Aortic atherosclerosis    Chronic migraine without aura without status migrainosus, not intractable    Chronic left shoulder pain    Incomplete tear of left rotator cuff    Primary osteoarthritis, left shoulder    Pain    Decreased range of motion of finger of right hand    Posterior vitreous detachment of both eyes    Pseudophakia of both eyes    Dry eye syndrome of both eyes    Meibomian gland dysfunction (MGD) of both eyes    Left wrist pain    Neuropathy    Foot drop, bilateral    Low back pain    Chronic left-sided low back pain with left-sided sciatica   [2]   Current Facility-Administered Medications on File Prior to Encounter   Medication Dose Route Frequency Provider Last Rate Last Admin    onabotulinumtoxina injection 200 Units  200 Units Intramuscular q12 weeks    200 Units at 07/10/25 1015     Current Outpatient Medications on File Prior to Encounter   Medication Sig Dispense Refill    alendronate (FOSAMAX) 70 MG tablet Take 1 tablet (70 mg total) by mouth every 7 days. Take with a full glass of water weekly 4 tablet 11    atorvastatin (LIPITOR) 40 MG tablet Take 1 tablet (40 mg total) by mouth once daily. (Patient taking differently: Take 40 mg by mouth every evening.) 90 tablet 1    butalbital-acetaminophen-caffeine -40 mg (FIORICET, ESGIC) -40 mg per tablet Take 1 tablet by mouth daily as needed for 30 days. 15 tablet 2    celecoxib (CELEBREX) 200 MG capsule Take 1 capsule (200 mg total) by mouth 2 (two) times a day. 240 capsule 0    clindamycin (CLEOCIN) 150 MG capsule Take 4 capsules 1 hour prior to dental appointment 12 capsule 1    clonazePAM (KLONOPIN) 0.5 MG tablet Take 1 tablet by mouth twice a day as needed for anxiety 60 tablet 3    cycloSPORINE (VEVYE) 0.1 % Drop Place 1 drop into both eyes 2 (two) times a day. 2 mL 11    ergocalciferol (VITAMIN D2) 50,000 unit Cap Take 1 capsule (50,000 Units total) by mouth  every 7 days. 12 capsule 3    HYDROcodone-acetaminophen (NORCO) 5-325 mg per tablet Take 1 tablet by mouth every 8 (eight) hours as needed for Pain. 90 tablet 0    hydrocortisone-pramoxine (ANALPRAM-HC) 2.5-1 % Crea Place rectally 3 (three) times daily. (Patient not taking: Reported on 7/30/2025) 30 g 2    ketoconazole (NIZORAL) 2 % cream Apply to affected areas of body folds twice daily as needed for irritation. 60 g 5    lamoTRIgine (LAMICTAL) 200 MG tablet Take 1 tablet (200 mg total) by mouth once daily. 90 tablet 3    methocarbamoL (ROBAXIN) 750 MG Tab Take 1 tablet (750 mg total) by mouth 3 (three) times daily. (Patient not taking: Reported on 7/30/2025) 60 tablet 2    perfluorohexyloctane, PF, (MIEBO, PF,) 100 % Drop Place 1 drop into both eyes 4 (four) times daily. 3 mL 11    pregabalin (LYRICA) 25 MG capsule Take 1 capsule (25 mg total) by mouth 2 (two) times daily. 180 capsule 1    pregabalin (LYRICA) 75 MG capsule Take 3 capsules (225 mg total) by mouth daily at night 270 capsule 1    RABEprazole (ACIPHEX) 20 mg tablet Take 1 tablet (20 mg total) by mouth 2 (two) times daily. 180 tablet 0    selegiline (EMSAM) 9 mg/24 hr Place 1 patch onto the skin once daily. (Patient not taking: Reported on 7/30/2025) 30 patch 11    traZODone (DESYREL) 100 MG tablet Take 1 tablet (100 mg total) by mouth every evening. 90 tablet 0    triamcinolone acetonide 0.025% (KENALOG) 0.025 % cream Apply to affected areas of body folds twice daily as needed for irritation. Do not use for longer than 2 weeks in a row. (Patient not taking: Reported on 7/30/2025) 30 g 2    ubrogepant (UBRELVY) 100 mg tablet Take 1 tablet (100 mg total) by mouth every 2 (two) hours as needed for Migraine. Max 200 mg/day. 16 tablet 5    UNABLE TO FIND medication name: Lidocaine 4.42% solution nasal spray.  Administer 2-3 sprays in each nostril at onset of headache.  May repeat in 1 hour.  No more than 12 sprays in 24 hours. 15 mL 5

## 2025-07-30 NOTE — TELEPHONE ENCOUNTER
The Patient messaged in the In Basket about possibly not being a first case tomorrow for his surgery with Dr. Amadou Lewis.  Joshua Del Rio had asked for him to be his second case.  Arrival time of 0730 sent to the Patient.       The Patient later messaged back saying that he had a recent Thyroid U/S and that he needs to have 2 nodules biopsied.  He wanted Dr. Lewis to be aware.  I messaged Dr. Lewis with this information.

## 2025-07-31 ENCOUNTER — PATIENT MESSAGE (OUTPATIENT)
Dept: ENDOCRINOLOGY | Facility: CLINIC | Age: 75
End: 2025-07-31
Payer: MEDICARE

## 2025-07-31 ENCOUNTER — HOSPITAL ENCOUNTER (OUTPATIENT)
Facility: HOSPITAL | Age: 75
Discharge: HOME | End: 2025-07-31
Attending: SURGERY | Admitting: SURGERY
Payer: MEDICARE

## 2025-07-31 ENCOUNTER — ANESTHESIA (OUTPATIENT)
Dept: SURGERY | Facility: HOSPITAL | Age: 75
End: 2025-07-31
Payer: MEDICARE

## 2025-07-31 VITALS
SYSTOLIC BLOOD PRESSURE: 165 MMHG | DIASTOLIC BLOOD PRESSURE: 81 MMHG | RESPIRATION RATE: 20 BRPM | HEART RATE: 71 BPM | HEIGHT: 68 IN | BODY MASS INDEX: 25.49 KG/M2 | TEMPERATURE: 98 F | WEIGHT: 168.19 LBS | OXYGEN SATURATION: 97 %

## 2025-07-31 DIAGNOSIS — K40.90 RIGHT INGUINAL HERNIA: ICD-10-CM

## 2025-07-31 DIAGNOSIS — E04.2 MULTIPLE THYROID NODULES: Primary | ICD-10-CM

## 2025-07-31 PROCEDURE — 37000008 HC ANESTHESIA 1ST 15 MINUTES: Performed by: SURGERY

## 2025-07-31 PROCEDURE — 25000003 PHARM REV CODE 250

## 2025-07-31 PROCEDURE — 36000708 HC OR TIME LEV III 1ST 15 MIN: Performed by: SURGERY

## 2025-07-31 PROCEDURE — 36000709 HC OR TIME LEV III EA ADD 15 MIN: Performed by: SURGERY

## 2025-07-31 PROCEDURE — 63600175 PHARM REV CODE 636 W HCPCS

## 2025-07-31 PROCEDURE — 49650 LAP ING HERNIA REPAIR INIT: CPT | Mod: RT,,, | Performed by: SURGERY

## 2025-07-31 PROCEDURE — 37000009 HC ANESTHESIA EA ADD 15 MINS: Performed by: SURGERY

## 2025-07-31 PROCEDURE — C1781 MESH (IMPLANTABLE): HCPCS | Performed by: SURGERY

## 2025-07-31 PROCEDURE — 63600175 PHARM REV CODE 636 W HCPCS: Performed by: SURGERY

## 2025-07-31 PROCEDURE — 27201423 OPTIME MED/SURG SUP & DEVICES STERILE SUPPLY: Performed by: SURGERY

## 2025-07-31 PROCEDURE — 71000016 HC POSTOP RECOV ADDL HR: Performed by: SURGERY

## 2025-07-31 PROCEDURE — 71000044 HC DOSC ROUTINE RECOVERY FIRST HOUR: Performed by: SURGERY

## 2025-07-31 PROCEDURE — C1727 CATH, BAL TIS DIS, NON-VAS: HCPCS | Performed by: SURGERY

## 2025-07-31 PROCEDURE — 71000015 HC POSTOP RECOV 1ST HR: Performed by: SURGERY

## 2025-07-31 DEVICE — 3DMAX MID ANATOMICAL MESH, 10 CM X 16 CM (4" X 6"), LARGE, RIGHT
Type: IMPLANTABLE DEVICE | Site: INGUINAL | Status: FUNCTIONAL
Brand: 3DMAX

## 2025-07-31 RX ORDER — OXYCODONE HYDROCHLORIDE 5 MG/1
5 TABLET ORAL EVERY 6 HOURS PRN
Qty: 12 TABLET | Refills: 0 | Status: SHIPPED | OUTPATIENT
Start: 2025-07-31 | End: 2025-07-31 | Stop reason: HOSPADM

## 2025-07-31 RX ORDER — ROCURONIUM BROMIDE 10 MG/ML
INJECTION, SOLUTION INTRAVENOUS
Status: DISCONTINUED | OUTPATIENT
Start: 2025-07-31 | End: 2025-07-31

## 2025-07-31 RX ORDER — BUPIVACAINE HYDROCHLORIDE 2.5 MG/ML
INJECTION, SOLUTION EPIDURAL; INFILTRATION; INTRACAUDAL; PERINEURAL
Status: DISCONTINUED | OUTPATIENT
Start: 2025-07-31 | End: 2025-07-31 | Stop reason: HOSPADM

## 2025-07-31 RX ORDER — CEFAZOLIN SODIUM 1 G/3ML
INJECTION, POWDER, FOR SOLUTION INTRAMUSCULAR; INTRAVENOUS
Status: DISCONTINUED | OUTPATIENT
Start: 2025-07-31 | End: 2025-07-31

## 2025-07-31 RX ORDER — LIDOCAINE HYDROCHLORIDE 10 MG/ML
INJECTION, SOLUTION INTRAVENOUS
Status: DISCONTINUED | OUTPATIENT
Start: 2025-07-31 | End: 2025-07-31

## 2025-07-31 RX ORDER — PROPOFOL 10 MG/ML
VIAL (ML) INTRAVENOUS
Status: DISCONTINUED | OUTPATIENT
Start: 2025-07-31 | End: 2025-07-31

## 2025-07-31 RX ORDER — DEXAMETHASONE SODIUM PHOSPHATE 4 MG/ML
INJECTION, SOLUTION INTRA-ARTICULAR; INTRALESIONAL; INTRAMUSCULAR; INTRAVENOUS; SOFT TISSUE
Status: DISCONTINUED | OUTPATIENT
Start: 2025-07-31 | End: 2025-07-31

## 2025-07-31 RX ORDER — GLUCAGON 1 MG
1 KIT INJECTION
Status: DISCONTINUED | OUTPATIENT
Start: 2025-07-31 | End: 2025-07-31 | Stop reason: HOSPADM

## 2025-07-31 RX ORDER — PHENYLEPHRINE HCL IN 0.9% NACL 1 MG/10 ML
SYRINGE (ML) INTRAVENOUS
Status: DISCONTINUED | OUTPATIENT
Start: 2025-07-31 | End: 2025-07-31

## 2025-07-31 RX ORDER — CEFAZOLIN 2 G/1
2 INJECTION, POWDER, FOR SOLUTION INTRAMUSCULAR; INTRAVENOUS
Status: DISCONTINUED | OUTPATIENT
Start: 2025-07-31 | End: 2025-07-31 | Stop reason: HOSPADM

## 2025-07-31 RX ORDER — OXYCODONE HYDROCHLORIDE 5 MG/1
5 TABLET ORAL
Status: DISCONTINUED | OUTPATIENT
Start: 2025-07-31 | End: 2025-07-31 | Stop reason: HOSPADM

## 2025-07-31 RX ORDER — HYDROMORPHONE HYDROCHLORIDE 1 MG/ML
INJECTION, SOLUTION INTRAMUSCULAR; INTRAVENOUS; SUBCUTANEOUS
Status: DISCONTINUED | OUTPATIENT
Start: 2025-07-31 | End: 2025-07-31

## 2025-07-31 RX ORDER — ONDANSETRON HYDROCHLORIDE 2 MG/ML
4 INJECTION, SOLUTION INTRAVENOUS DAILY PRN
Status: DISCONTINUED | OUTPATIENT
Start: 2025-07-31 | End: 2025-07-31 | Stop reason: HOSPADM

## 2025-07-31 RX ORDER — ACETAMINOPHEN 500 MG
1000 TABLET ORAL ONCE
Status: COMPLETED | OUTPATIENT
Start: 2025-07-31 | End: 2025-07-31

## 2025-07-31 RX ORDER — HYDROMORPHONE HYDROCHLORIDE 1 MG/ML
0.2 INJECTION, SOLUTION INTRAMUSCULAR; INTRAVENOUS; SUBCUTANEOUS EVERY 5 MIN PRN
Status: DISCONTINUED | OUTPATIENT
Start: 2025-07-31 | End: 2025-07-31 | Stop reason: HOSPADM

## 2025-07-31 RX ORDER — SODIUM CHLORIDE 0.9 % (FLUSH) 0.9 %
10 SYRINGE (ML) INJECTION EVERY 6 HOURS PRN
Status: DISCONTINUED | OUTPATIENT
Start: 2025-07-31 | End: 2025-07-31 | Stop reason: HOSPADM

## 2025-07-31 RX ORDER — FENTANYL CITRATE 50 UG/ML
INJECTION, SOLUTION INTRAMUSCULAR; INTRAVENOUS
Status: DISCONTINUED | OUTPATIENT
Start: 2025-07-31 | End: 2025-07-31

## 2025-07-31 RX ORDER — HYDROCODONE BITARTRATE AND ACETAMINOPHEN 5; 325 MG/1; MG/1
1 TABLET ORAL EVERY 6 HOURS PRN
Qty: 12 TABLET | Refills: 0 | Status: SHIPPED | OUTPATIENT
Start: 2025-07-31

## 2025-07-31 RX ORDER — MIDAZOLAM HYDROCHLORIDE 1 MG/ML
INJECTION INTRAMUSCULAR; INTRAVENOUS
Status: DISCONTINUED | OUTPATIENT
Start: 2025-07-31 | End: 2025-07-31

## 2025-07-31 RX ORDER — LIDOCAINE HYDROCHLORIDE AND EPINEPHRINE 10; 10 UG/ML; MG/ML
INJECTION, SOLUTION INFILTRATION; PERINEURAL
Status: DISCONTINUED | OUTPATIENT
Start: 2025-07-31 | End: 2025-07-31 | Stop reason: HOSPADM

## 2025-07-31 RX ORDER — ONDANSETRON HYDROCHLORIDE 2 MG/ML
INJECTION, SOLUTION INTRAVENOUS
Status: DISCONTINUED | OUTPATIENT
Start: 2025-07-31 | End: 2025-07-31

## 2025-07-31 RX ADMIN — CEFAZOLIN 2 G: 330 INJECTION, POWDER, FOR SOLUTION INTRAMUSCULAR; INTRAVENOUS at 09:07

## 2025-07-31 RX ADMIN — ACETAMINOPHEN 1000 MG: 500 TABLET ORAL at 08:07

## 2025-07-31 RX ADMIN — LIDOCAINE HYDROCHLORIDE 80 MG: 10 INJECTION, SOLUTION INTRAVENOUS at 09:07

## 2025-07-31 RX ADMIN — HYDROMORPHONE HYDROCHLORIDE 0.2 MG: 1 INJECTION, SOLUTION INTRAMUSCULAR; INTRAVENOUS; SUBCUTANEOUS at 11:07

## 2025-07-31 RX ADMIN — ROCURONIUM BROMIDE 50 MG: 10 INJECTION, SOLUTION INTRAVENOUS at 09:07

## 2025-07-31 RX ADMIN — PROPOFOL 150 MG: 10 INJECTION, EMULSION INTRAVENOUS at 09:07

## 2025-07-31 RX ADMIN — Medication 100 MCG: at 10:07

## 2025-07-31 RX ADMIN — HYDROMORPHONE HYDROCHLORIDE 0.2 MG: 1 INJECTION, SOLUTION INTRAMUSCULAR; INTRAVENOUS; SUBCUTANEOUS at 12:07

## 2025-07-31 RX ADMIN — ONDANSETRON 4 MG: 2 INJECTION INTRAMUSCULAR; INTRAVENOUS at 10:07

## 2025-07-31 RX ADMIN — GLYCOPYRROLATE 0.2 MG: 0.2 INJECTION, SOLUTION INTRAMUSCULAR; INTRAVENOUS at 10:07

## 2025-07-31 RX ADMIN — HYDROMORPHONE HYDROCHLORIDE 0.2 MG: 1 INJECTION, SOLUTION INTRAMUSCULAR; INTRAVENOUS; SUBCUTANEOUS at 10:07

## 2025-07-31 RX ADMIN — SODIUM CHLORIDE: 0.9 INJECTION, SOLUTION INTRAVENOUS at 09:07

## 2025-07-31 RX ADMIN — MIDAZOLAM HYDROCHLORIDE 1 MG: 2 INJECTION, SOLUTION INTRAMUSCULAR; INTRAVENOUS at 09:07

## 2025-07-31 RX ADMIN — SODIUM CHLORIDE, SODIUM GLUCONATE, SODIUM ACETATE, POTASSIUM CHLORIDE, MAGNESIUM CHLORIDE, SODIUM PHOSPHATE, DIBASIC, AND POTASSIUM PHOSPHATE: .53; .5; .37; .037; .03; .012; .00082 INJECTION, SOLUTION INTRAVENOUS at 09:07

## 2025-07-31 RX ADMIN — DEXAMETHASONE SODIUM PHOSPHATE 4 MG: 4 INJECTION, SOLUTION INTRAMUSCULAR; INTRAVENOUS at 09:07

## 2025-07-31 RX ADMIN — FENTANYL CITRATE 50 MCG: 50 INJECTION, SOLUTION INTRAMUSCULAR; INTRAVENOUS at 09:07

## 2025-07-31 RX ADMIN — SUGAMMADEX 400 MG: 100 INJECTION, SOLUTION INTRAVENOUS at 10:07

## 2025-07-31 NOTE — BRIEF OP NOTE
[facility] - Surgery ([facility])  Brief Operative Note    Surgery Date: 7/31/2025     Surgeons and Role:     * Amadou Lewis Jr., MD - Primary     * Arlet Kelley MD - Resident - Assisting     * Renea Serrano MD - Resident - Assisting        Pre-op Diagnosis:  Right inguinal hernia [K40.90]    Post-op Diagnosis:  Post-Op Diagnosis Codes:     * Right inguinal hernia [K40.90]    Procedure(s) (LRB):  REPAIR, INGUINAL HERNIA, LAPAROSCOPIC, RIGHT WITH MESH (Right)    Anesthesia: General    Operative Findings: TEP right inguinal hernia repair with mesh. Direct right hernia. No left sided hernias.    Estimated Blood Loss: minimal         Specimens:   Specimen (24h ago, onward)      None            * No specimens in log *        Discharge Note    OUTCOME: Patient tolerated treatment/procedure well without complication and is now ready for discharge.    DISPOSITION: Home or Self Care    FINAL DIAGNOSIS:  Right inguinal hernia [K40.90]    FOLLOWUP: In clinic    DISCHARGE INSTRUCTIONS:    Discharge Procedure Orders   Diet Adult Regular     Notify your health care provider if you experience any of the following:  temperature >100.4     Notify your health care provider if you experience any of the following:  persistent nausea and vomiting or diarrhea     Notify your health care provider if you experience any of the following:  severe uncontrolled pain     Notify your health care provider if you experience any of the following:  redness, tenderness, or signs of infection (pain, swelling, redness, odor or green/yellow discharge around incision site)     Notify your health care provider if you experience any of the following:  difficulty breathing or increased cough     Weight bearing restrictions (specify):   Order Comments: Do not lift greater than 10 lbs for 4 weeks

## 2025-07-31 NOTE — PLAN OF CARE
Pt meets criteria for discharge. AAO, tolerating po liquids. No s/s of pain. Discharge instructions given and reviewed.

## 2025-07-31 NOTE — DISCHARGE INSTRUCTIONS
Discharge Instructions     WOUND CARE  -- You have steri strips over the incisions. Allow these to fall off on their own.  -- Ok to shower; however, no baths or submerging in water (I.e. swimming, submerging in water) for at least two weeks.  -- Please keep the incision clean with soap and water, pat your incision dry, do not scrub hard over your incisions.     MEDICATIONS AND PAIN CONTROL  -- Please resume all home medications as instructed and take any newly prescribed medications.  -- Most people find that over-the-counter pain medications (Tylenol combined with Ibuprofen) will be sufficient for most pain control.  -- You have been prescribed a narcotic pain medication. Please wean narcotic over the next few daysIf you're taking prescription narcotics, do not drive or operate heavy machinery. Do not drive if your pain is not controlled enough for you to react quickly safely.  -- No straining, avoid constipation. May take OTC stool softeners as described and laxatives as needed.     OTHER INSTRUCTIONS  -- Monitor for temp > 101 F, bleeding, redness, purulent drainage, or any sudden, new extreme pain. If any occur, please call our clinic or go to the emergency department if after normal business hours.  -- You may resume your regular diet as tolerated.   -- No heavy lifting (anything >10 lbs or = to a gallon of milk) or strenuous exercise until cleared by a physician. No pushing or pulling activities such as vacuuming or raking. Otherwise, activity as tolerated.   -- Follow up with Dr. Lewis in 2 weeks in clinic for a post-op check.

## 2025-07-31 NOTE — OP NOTE
DATE OF PROCEDURE: 07/31/2025  SERVICE: General Surgery.   Surgeons and Role:     * Amadou Lewis Jr., MD - Primary     * Arlet Kelley MD - Resident - Assisting     * Renea Serrano MD - Resident - Assisting  PREOPERATIVE DIAGNOSIS: Right inguinal hernia.   POSTOPERATIVE DIAGNOSIS: Right inguinal hernia.   PROCEDURE: Laparoscopic repair of right inguinal hernia with mesh and   evaluation of the left.   ANESTHESIA: General endotracheal and local.   DESCRIPTION OF PROCEDURE: The patient was taken to the Operating Room, placed   under general anesthesia, and prepped and draped in sterile fashion. At this   time, an infraumbilical incision was made after infiltrating with local   anesthetic. This was carried down to the linea alba with electrocautery and   blunt dissection. An incision was made in the anterior fascia, just to the right  of the linea alba. At this time, the rectus muscle was then swept laterally   and elevated, and a dissecting balloon trocar was placed in the retrorectus   space. At this time, under direct visualization, the balloon was insufflated,   creating the retrorectus space without difficulty. The trocar was then inserted   into this retrorectus space, and the balloon was insufflated and the dissecting   balloon was removed. Once this was complete, further ports were placed in the   midline, 5 mm in nature, one 1 fingerbreadth above the pubic symphysis and one   between the suprapubic port and the infraumbilical port. These were placed   under direct visualization, after infiltrating with local anesthetic. Once the   ports were placed, we turned our attention to the right side.  We swept the peritoneum   down laterally, beginning at the anterior superior iliac spine and continuing this dissection medially   towards the cord. The cord was elevated. There was no  indirect hernia present but a moderate cord lipoma was noted and reduced. The peritoneum was  from the cord  structures and   reduced back to the level of the umbilicus. The cord itself was skeletonized   and inspected, no signs of any other abnormalities.  At this time, we continued dissection   medially towards the direct space. There was a moderate direct hernia   present, this was reduced and we cleared Paxton's ligament medially as well. Once we had completed   dissection on the right side, we turned our attention to the left side,   evaluated this, and again we swept the peritoneum down laterally and inspected   the cord. The peritoneal reflection was at the base of the cord. There were no   signs of an indirect hernia. We inspected the direct space and no signs   of a direct hernia. Once we had determined there was no hernia on the left side, we   proceeded with placement of mesh on the right. A piece of large 3DMAX mesh was   placed into the retrorectus space on the right side. Medially, it was brought   to midline and anteriorly to the first port. It was then tacked with two tacks   to Paxton's ligament medially, one to the rectus muscle anteriorly and then two   tacks laterally above the ASIS. At this time, the mesh was inspected, and it   was in very good position covering all defects. The sac had been dissected back   to the umbilicus and was well out of the way of the mesh repair. At this time   under direct visualization, the air was evacuated from the retrorectus space.   The ports were then removed. The retrorectus space was then infiltrated with   further 20 mL of local anesthetic and at this time, the infraumbilical port was   removed. The anterior fascia was closed with 0 Vicryl suture in figure-of-eight   fashion, closing the fascia of this incision; and the port sites were closed   with 4-0 Monocryl in subcuticular fashion. Mastisol and Steri-Strips were then   placed. The patient was allowed to awake from general anesthesia and   transferred to bed for transport to Recovery.   COMPLICATIONS: None.    SPONGE COUNT: Correct.   BLOOD LOSS: 15 mL.   FLUIDS: Per Anesthesia.   BLOOD GIVEN: None.   DRAINS: None.   SPECIMENS: None.   CONDITION OF PATIENT: Good.   I was present for the entire procedure.

## 2025-07-31 NOTE — PLAN OF CARE
"Pt states he is having difficulty moving his right leg. Sensation present. H&P indicates patient has history of "gait problem."  Rashad Lewis and Zach notified of patient's complaint, however patient is visually noted to be able to flex and extend knees and wiggle toes bilaterally. Spouse at bedside.  "

## 2025-07-31 NOTE — PLAN OF CARE
Pt meets criteria for discharge. AAO, tolerating po liquids. No s/s of pain. Discharge instructions given and reviewed. Patient discharged to home at 1345.

## 2025-07-31 NOTE — TRANSFER OF CARE
"Anesthesia Transfer of Care Note    Patient: Raffy Rutherford Jr.    Procedure(s) Performed: Procedure(s) (LRB):  REPAIR, INGUINAL HERNIA, LAPAROSCOPIC, RIGHT WITH MESH (Right)    Patient location: [facility]    Transport from OR: Transported from OR on 6-10 L/min O2 by face mask with adequate spontaneous ventilation    Post pain: adequate analgesia    Post assessment: no apparent anesthetic complications    Post vital signs: stable    Level of consciousness: awake, alert and oriented    Nausea/Vomiting: no nausea/vomiting    Complications: none    Transfer of care protocol was followed      Last vitals: Visit Vitals  /76 (BP Location: Right arm, Patient Position: Lying)   Pulse (!) 59   Temp 36.6 °C (97.9 °F) (Temporal)   Resp 20   Ht 5' 8" (1.727 m)   Wt 76.3 kg (168 lb 3.4 oz)   SpO2 95%   BMI 25.58 kg/m²     "

## 2025-07-31 NOTE — ANESTHESIA PROCEDURE NOTES
Intubation    Date/Time: 7/31/2025 9:39 AM    Performed by: Antonio Billy MD  Authorized by: Juan Luis Pfeiffer MD    Intubation:     Induction:  Intravenous    Intubated:  Postinduction    Mask Ventilation:  Easy with oral airway    Attempts:  1    Attempted By:  Resident anesthesiologist    Method of Intubation:  Video laryngoscopy    Blade:  Vazquez 3    Laryngeal View Grade: Grade I - full view of cords      Difficult Airway Encountered?: No      Complications:  None    Airway Device:  Oral endotracheal tube    Airway Device Size:  7.5    Style/Cuff Inflation:  Cuffed (inflated to minimal occlusive pressure)    Tube secured:  24    Secured at:  The lips    Placement Verified By:  Capnometry    Complicating Factors:  None    Findings Post-Intubation:  BS equal bilateral

## 2025-08-01 NOTE — ANESTHESIA POSTPROCEDURE EVALUATION
Anesthesia Post Evaluation    Patient: Raffy Rutherford Jr.    Procedure(s) Performed: Procedure(s) (LRB):  REPAIR, INGUINAL HERNIA, LAPAROSCOPIC, RIGHT WITH MESH (Right)    Final Anesthesia Type: general      Patient location during evaluation: PACU  Patient participation: Yes- Able to Participate  Level of consciousness: awake and alert  Post-procedure vital signs: reviewed and stable  Airway patency: patent    PONV status at discharge: No PONV  Anesthetic complications: no      Cardiovascular status: blood pressure returned to baseline  Respiratory status: unassisted  Hydration status: euvolemic  Follow-up not needed.              Vitals Value Taken Time   /81 07/31/25 13:45   Temp 36.7 °C (98.1 °F) 07/31/25 13:45   Pulse 71 07/31/25 13:45   Resp 20 07/31/25 13:45   SpO2 97 % 07/31/25 13:45         No case tracking events are documented in the log.      Pain/Tequila Score: Pain Rating Prior to Med Admin: 6 (7/31/2025 12:05 PM)  Pain Rating Post Med Admin: 0 (7/31/2025  1:45 PM)  Tequila Score: 9 (7/31/2025 11:30 AM)

## 2025-08-04 RX ORDER — CYCLOSPORINE OPHTHALMIC SOLUTION 1 MG/ML
1 SOLUTION/ DROPS OPHTHALMIC 2 TIMES DAILY
Qty: 2 ML | Refills: 11 | Status: CANCELLED | OUTPATIENT
Start: 2025-08-04

## 2025-08-06 ENCOUNTER — PATIENT MESSAGE (OUTPATIENT)
Dept: OPTOMETRY | Facility: CLINIC | Age: 75
End: 2025-08-06
Payer: MEDICARE

## 2025-08-06 RX ORDER — CYCLOSPORINE OPHTHALMIC SOLUTION 1 MG/ML
1 SOLUTION/ DROPS OPHTHALMIC 2 TIMES DAILY
Qty: 2 ML | Refills: 11 | Status: SHIPPED | OUTPATIENT
Start: 2025-08-06

## 2025-08-12 ENCOUNTER — PATIENT MESSAGE (OUTPATIENT)
Dept: PAIN MEDICINE | Facility: CLINIC | Age: 75
End: 2025-08-12
Payer: MEDICARE

## 2025-08-12 DIAGNOSIS — G60.9 IDIOPATHIC PERIPHERAL NEUROPATHY: Primary | ICD-10-CM

## 2025-08-13 ENCOUNTER — OFFICE VISIT (OUTPATIENT)
Dept: PAIN MEDICINE | Facility: CLINIC | Age: 75
End: 2025-08-13
Payer: MEDICARE

## 2025-08-13 VITALS — SYSTOLIC BLOOD PRESSURE: 140 MMHG | HEART RATE: 70 BPM | DIASTOLIC BLOOD PRESSURE: 80 MMHG

## 2025-08-13 DIAGNOSIS — M46.1 BILATERAL SACROILIITIS: ICD-10-CM

## 2025-08-13 DIAGNOSIS — M48.061 SPINAL STENOSIS, LUMBAR REGION, WITHOUT NEUROGENIC CLAUDICATION: ICD-10-CM

## 2025-08-13 DIAGNOSIS — M96.1 POST LAMINECTOMY SYNDROME: ICD-10-CM

## 2025-08-13 DIAGNOSIS — G89.29 CHRONIC PAIN OF RIGHT HIP: ICD-10-CM

## 2025-08-13 DIAGNOSIS — G60.9 IDIOPATHIC PERIPHERAL NEUROPATHY: Primary | ICD-10-CM

## 2025-08-13 DIAGNOSIS — M25.551 CHRONIC PAIN OF RIGHT HIP: ICD-10-CM

## 2025-08-13 PROCEDURE — 3044F HG A1C LEVEL LT 7.0%: CPT | Mod: CPTII,S$GLB,, | Performed by: STUDENT IN AN ORGANIZED HEALTH CARE EDUCATION/TRAINING PROGRAM

## 2025-08-13 PROCEDURE — 3288F FALL RISK ASSESSMENT DOCD: CPT | Mod: CPTII,S$GLB,, | Performed by: STUDENT IN AN ORGANIZED HEALTH CARE EDUCATION/TRAINING PROGRAM

## 2025-08-13 PROCEDURE — 99214 OFFICE O/P EST MOD 30 MIN: CPT | Mod: S$GLB,,, | Performed by: STUDENT IN AN ORGANIZED HEALTH CARE EDUCATION/TRAINING PROGRAM

## 2025-08-13 PROCEDURE — 99999 PR PBB SHADOW E&M-EST. PATIENT-LVL III: CPT | Mod: PBBFAC,,, | Performed by: STUDENT IN AN ORGANIZED HEALTH CARE EDUCATION/TRAINING PROGRAM

## 2025-08-13 PROCEDURE — 1125F AMNT PAIN NOTED PAIN PRSNT: CPT | Mod: CPTII,S$GLB,, | Performed by: STUDENT IN AN ORGANIZED HEALTH CARE EDUCATION/TRAINING PROGRAM

## 2025-08-13 PROCEDURE — 1159F MED LIST DOCD IN RCRD: CPT | Mod: CPTII,S$GLB,, | Performed by: STUDENT IN AN ORGANIZED HEALTH CARE EDUCATION/TRAINING PROGRAM

## 2025-08-13 PROCEDURE — 1101F PT FALLS ASSESS-DOCD LE1/YR: CPT | Mod: CPTII,S$GLB,, | Performed by: STUDENT IN AN ORGANIZED HEALTH CARE EDUCATION/TRAINING PROGRAM

## 2025-08-13 PROCEDURE — 1157F ADVNC CARE PLAN IN RCRD: CPT | Mod: CPTII,S$GLB,, | Performed by: STUDENT IN AN ORGANIZED HEALTH CARE EDUCATION/TRAINING PROGRAM

## 2025-08-13 PROCEDURE — 3077F SYST BP >= 140 MM HG: CPT | Mod: CPTII,S$GLB,, | Performed by: STUDENT IN AN ORGANIZED HEALTH CARE EDUCATION/TRAINING PROGRAM

## 2025-08-13 PROCEDURE — 3079F DIAST BP 80-89 MM HG: CPT | Mod: CPTII,S$GLB,, | Performed by: STUDENT IN AN ORGANIZED HEALTH CARE EDUCATION/TRAINING PROGRAM

## 2025-08-15 ENCOUNTER — OFFICE VISIT (OUTPATIENT)
Dept: SURGERY | Facility: CLINIC | Age: 75
End: 2025-08-15
Payer: MEDICARE

## 2025-08-15 ENCOUNTER — PATIENT MESSAGE (OUTPATIENT)
Dept: INTERNAL MEDICINE | Facility: CLINIC | Age: 75
End: 2025-08-15
Payer: MEDICARE

## 2025-08-15 VITALS
WEIGHT: 173.19 LBS | DIASTOLIC BLOOD PRESSURE: 82 MMHG | BODY MASS INDEX: 26.25 KG/M2 | HEART RATE: 75 BPM | SYSTOLIC BLOOD PRESSURE: 137 MMHG | HEIGHT: 68 IN

## 2025-08-15 DIAGNOSIS — Z09 POSTOP CHECK: Primary | ICD-10-CM

## 2025-08-15 PROCEDURE — 99999 PR PBB SHADOW E&M-EST. PATIENT-LVL IV: CPT | Mod: PBBFAC,,, | Performed by: SURGERY

## 2025-08-18 ENCOUNTER — PATIENT MESSAGE (OUTPATIENT)
Dept: INTERNAL MEDICINE | Facility: CLINIC | Age: 75
End: 2025-08-18

## 2025-08-18 ENCOUNTER — HOSPITAL ENCOUNTER (OUTPATIENT)
Dept: RADIOLOGY | Facility: HOSPITAL | Age: 75
Discharge: HOME | End: 2025-08-18
Attending: INTERNAL MEDICINE
Payer: MEDICARE

## 2025-08-18 ENCOUNTER — PATIENT MESSAGE (OUTPATIENT)
Dept: ORTHOPEDICS | Facility: CLINIC | Age: 75
End: 2025-08-18
Payer: MEDICARE

## 2025-08-18 ENCOUNTER — OFFICE VISIT (OUTPATIENT)
Dept: INTERNAL MEDICINE | Facility: CLINIC | Age: 75
End: 2025-08-18
Payer: MEDICARE

## 2025-08-18 VITALS
WEIGHT: 174.19 LBS | HEART RATE: 66 BPM | HEIGHT: 68 IN | BODY MASS INDEX: 26.4 KG/M2 | OXYGEN SATURATION: 98 % | DIASTOLIC BLOOD PRESSURE: 62 MMHG | SYSTOLIC BLOOD PRESSURE: 110 MMHG

## 2025-08-18 DIAGNOSIS — R10.31 RIGHT GROIN PAIN: ICD-10-CM

## 2025-08-18 DIAGNOSIS — M25.531 RIGHT WRIST PAIN: ICD-10-CM
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
## 2025-08-18 DIAGNOSIS — F41.9 ANXIETY: ICD-10-CM

## 2025-08-18 DIAGNOSIS — M21.371 FOOT DROP, BILATERAL: ICD-10-CM

## 2025-08-18 DIAGNOSIS — E78.5 HYPERLIPIDEMIA, UNSPECIFIED HYPERLIPIDEMIA TYPE: ICD-10-CM

## 2025-08-18 DIAGNOSIS — M21.372 FOOT DROP, BILATERAL: ICD-10-CM

## 2025-08-18 DIAGNOSIS — M25.552 LEFT HIP PAIN: ICD-10-CM
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
## 2025-08-18 DIAGNOSIS — G60.9 IDIOPATHIC PERIPHERAL NEUROPATHY: ICD-10-CM

## 2025-08-18 DIAGNOSIS — F33.9 EPISODE OF RECURRENT MAJOR DEPRESSIVE DISORDER, UNSPECIFIED DEPRESSION EPISODE SEVERITY: ICD-10-CM

## 2025-08-18 PROBLEM — G95.89 OTHER SPECIFIED DISEASES OF SPINAL CORD: Status: RESOLVED | Noted: 2021-12-07 | Resolved: 2025-08-18

## 2025-08-18 PROCEDURE — 73521 X-RAY EXAM HIPS BI 2 VIEWS: CPT | Mod: TC

## 2025-08-18 PROCEDURE — 73110 X-RAY EXAM OF WRIST: CPT | Mod: TC,RT

## 2025-08-18 PROCEDURE — 73130 X-RAY EXAM OF HAND: CPT | Mod: TC,RT

## 2025-08-18 PROCEDURE — 99999 PR PBB SHADOW E&M-EST. PATIENT-LVL V: CPT | Mod: PBBFAC,,, | Performed by: INTERNAL MEDICINE

## 2025-08-18 PROCEDURE — 73130 X-RAY EXAM OF HAND: CPT | Mod: 26,RT,, | Performed by: RADIOLOGY

## 2025-08-18 PROCEDURE — 73110 X-RAY EXAM OF WRIST: CPT | Mod: 26,RT,, | Performed by: RADIOLOGY

## 2025-08-18 RX ORDER — TRAZODONE HYDROCHLORIDE 100 MG/1
100 TABLET ORAL NIGHTLY
Qty: 90 TABLET | Refills: 3 | Status: SHIPPED | OUTPATIENT
Start: 2025-08-18

## 2025-08-18 RX ORDER — ATORVASTATIN CALCIUM 40 MG/1
40 TABLET, FILM COATED ORAL DAILY
Qty: 90 TABLET | Refills: 3 | Status: SHIPPED | OUTPATIENT
Start: 2025-08-18 | End: 2026-08-18

## 2025-08-21 ENCOUNTER — PATIENT OUTREACH (OUTPATIENT)
Dept: OTHER | Facility: OTHER | Age: 75
End: 2025-08-21
Payer: MEDICARE

## 2025-08-21 ENCOUNTER — HOSPITAL ENCOUNTER (OUTPATIENT)
Dept: ENDOCRINOLOGY | Facility: CLINIC | Age: 75
Discharge: HOME | End: 2025-08-21
Attending: INTERNAL MEDICINE
Payer: MEDICARE

## 2025-08-21 ENCOUNTER — CLINICAL SUPPORT (OUTPATIENT)
Dept: REHABILITATION | Facility: HOSPITAL | Age: 75
End: 2025-08-21
Payer: MEDICARE

## 2025-08-21 ENCOUNTER — PATIENT MESSAGE (OUTPATIENT)
Facility: CLINIC | Age: 75
End: 2025-08-21
Payer: MEDICARE

## 2025-08-21 ENCOUNTER — PATIENT MESSAGE (OUTPATIENT)
Dept: OTHER | Facility: OTHER | Age: 75
End: 2025-08-21
Payer: MEDICARE

## 2025-08-21 DIAGNOSIS — M72.0 DUPUYTREN'S DISEASE OF PALM OF RIGHT HAND: Primary | ICD-10-CM

## 2025-08-21 DIAGNOSIS — E04.2 MULTIPLE THYROID NODULES: Primary | ICD-10-CM

## 2025-08-21 DIAGNOSIS — M25.531 PAIN IN BOTH WRISTS: Primary | ICD-10-CM

## 2025-08-21 DIAGNOSIS — M65.4 DE QUERVAIN'S TENOSYNOVITIS, LEFT: ICD-10-CM

## 2025-08-21 DIAGNOSIS — M25.532 PAIN IN BOTH WRISTS: Primary | ICD-10-CM

## 2025-08-21 PROCEDURE — 88173 CYTOPATH EVAL FNA REPORT: CPT | Mod: TC

## 2025-08-21 PROCEDURE — 97140 MANUAL THERAPY 1/> REGIONS: CPT

## 2025-08-21 PROCEDURE — 97165 OT EVAL LOW COMPLEX 30 MIN: CPT

## 2025-08-21 PROCEDURE — 10005 FNA BX W/US GDN 1ST LES: CPT | Mod: S$GLB,,, | Performed by: INTERNAL MEDICINE

## 2025-08-21 PROCEDURE — 10006 FNA BX W/US GDN EA ADDL: CPT | Mod: S$GLB,,, | Performed by: INTERNAL MEDICINE

## 2025-08-22 ENCOUNTER — OFFICE VISIT (OUTPATIENT)
Dept: ORTHOPEDICS | Facility: CLINIC | Age: 75
End: 2025-08-22
Payer: MEDICARE

## 2025-08-22 VITALS — WEIGHT: 171.94 LBS | BODY MASS INDEX: 26.06 KG/M2 | HEIGHT: 68 IN

## 2025-08-22 DIAGNOSIS — S69.92XS: ICD-10-CM

## 2025-08-22 DIAGNOSIS — G56.03 CARPAL TUNNEL SYNDROME ON BOTH SIDES: ICD-10-CM

## 2025-08-22 DIAGNOSIS — M65.331 TRIGGER MIDDLE FINGER OF RIGHT HAND: Primary | ICD-10-CM

## 2025-08-22 DIAGNOSIS — G56.23 ULNAR NEUROPATHY OF BOTH UPPER EXTREMITIES: ICD-10-CM

## 2025-08-22 DIAGNOSIS — M72.0: ICD-10-CM

## 2025-08-22 DIAGNOSIS — M25.531 RIGHT WRIST PAIN: ICD-10-CM

## 2025-08-22 LAB
ESTROGEN SERPL-MCNC: NORMAL PG/ML
ESTROGEN SERPL-MCNC: NORMAL PG/ML
INSULIN SERPL-ACNC: NORMAL U[IU]/ML
INSULIN SERPL-ACNC: NORMAL U[IU]/ML
LAB AP CLINICAL INFORMATION: NORMAL
LAB AP CLINICAL INFORMATION: NORMAL
LAB AP GROSS DESCRIPTION: NORMAL
LAB AP GROSS DESCRIPTION: NORMAL
LAB AP NON-GYN INTERPRETATION SPECIMEN 1: NORMAL
LAB AP NON-GYN INTERPRETATION SPECIMEN 1: NORMAL
LAB AP PERFORMING LOCATION(S): NORMAL
LAB AP PERFORMING LOCATION(S): NORMAL

## 2025-08-22 PROCEDURE — 1157F ADVNC CARE PLAN IN RCRD: CPT | Mod: CPTII,S$GLB,,

## 2025-08-22 PROCEDURE — 99215 OFFICE O/P EST HI 40 MIN: CPT | Mod: 25,S$GLB,,

## 2025-08-22 PROCEDURE — 3044F HG A1C LEVEL LT 7.0%: CPT | Mod: CPTII,S$GLB,,

## 2025-08-22 PROCEDURE — 99999 PR PBB SHADOW E&M-EST. PATIENT-LVL IV: CPT | Mod: PBBFAC,,,

## 2025-08-22 PROCEDURE — 3288F FALL RISK ASSESSMENT DOCD: CPT | Mod: CPTII,S$GLB,,

## 2025-08-22 PROCEDURE — 20550 NJX 1 TENDON SHEATH/LIGAMENT: CPT | Mod: RT,S$GLB,,

## 2025-08-22 PROCEDURE — 1101F PT FALLS ASSESS-DOCD LE1/YR: CPT | Mod: CPTII,S$GLB,,

## 2025-08-22 PROCEDURE — 1125F AMNT PAIN NOTED PAIN PRSNT: CPT | Mod: CPTII,S$GLB,,

## 2025-08-22 PROCEDURE — 1159F MED LIST DOCD IN RCRD: CPT | Mod: CPTII,S$GLB,,

## 2025-08-22 PROCEDURE — 3008F BODY MASS INDEX DOCD: CPT | Mod: CPTII,S$GLB,,

## 2025-08-22 RX ADMIN — METHYLPREDNISOLONE ACETATE 40 MG: 40 INJECTION, SUSPENSION INTRA-ARTICULAR; INTRALESIONAL; INTRAMUSCULAR; SOFT TISSUE at 11:08

## 2025-08-24 RX ORDER — METHYLPREDNISOLONE ACETATE 40 MG/ML
40 INJECTION, SUSPENSION INTRA-ARTICULAR; INTRALESIONAL; INTRAMUSCULAR; SOFT TISSUE
Status: DISCONTINUED | OUTPATIENT
Start: 2025-08-22 | End: 2025-08-24 | Stop reason: HOSPADM

## 2025-08-26 ENCOUNTER — CLINICAL SUPPORT (OUTPATIENT)
Dept: REHABILITATION | Facility: HOSPITAL | Age: 75
End: 2025-08-26
Payer: MEDICARE

## 2025-08-26 DIAGNOSIS — M25.531 PAIN IN BOTH WRISTS: Primary | ICD-10-CM

## 2025-08-26 DIAGNOSIS — M25.532 PAIN IN BOTH WRISTS: Primary | ICD-10-CM

## 2025-08-26 DIAGNOSIS — C61 PROSTATE CANCER: Primary | ICD-10-CM

## 2025-08-26 PROCEDURE — 97018 PARAFFIN BATH THERAPY: CPT | Mod: CO

## 2025-08-26 PROCEDURE — 97112 NEUROMUSCULAR REEDUCATION: CPT | Mod: CO

## 2025-08-26 PROCEDURE — 97110 THERAPEUTIC EXERCISES: CPT | Mod: CO

## 2025-08-27 ENCOUNTER — TELEPHONE (OUTPATIENT)
Dept: DERMATOLOGY | Facility: CLINIC | Age: 75
End: 2025-08-27
Payer: MEDICARE

## 2025-08-27 ENCOUNTER — PATIENT MESSAGE (OUTPATIENT)
Dept: DERMATOLOGY | Facility: CLINIC | Age: 75
End: 2025-08-27
Payer: MEDICARE

## 2025-08-27 ENCOUNTER — PATIENT MESSAGE (OUTPATIENT)
Dept: REHABILITATION | Facility: HOSPITAL | Age: 75
End: 2025-08-27
Payer: MEDICARE

## 2025-08-27 ENCOUNTER — PATIENT MESSAGE (OUTPATIENT)
Dept: ORTHOPEDICS | Facility: CLINIC | Age: 75
End: 2025-08-27
Payer: MEDICARE

## 2025-08-27 DIAGNOSIS — S69.92XS: Primary | ICD-10-CM

## 2025-08-27 DIAGNOSIS — L60.8 DISCOLORATION OF NAIL: ICD-10-CM

## 2025-08-28 ENCOUNTER — OFFICE VISIT (OUTPATIENT)
Dept: DERMATOLOGY | Facility: CLINIC | Age: 75
End: 2025-08-28
Payer: MEDICARE

## 2025-08-28 ENCOUNTER — PATIENT MESSAGE (OUTPATIENT)
Dept: OTHER | Facility: OTHER | Age: 75
End: 2025-08-28
Payer: MEDICARE

## 2025-08-28 DIAGNOSIS — Z12.83 SCREENING EXAM FOR SKIN CANCER: ICD-10-CM

## 2025-08-28 DIAGNOSIS — L60.3 BRITTLE NAILS: ICD-10-CM

## 2025-08-28 DIAGNOSIS — B35.1 ONYCHOMYCOSIS: Primary | ICD-10-CM

## 2025-08-28 DIAGNOSIS — D22.9 MULTIPLE BENIGN MELANOCYTIC NEVI: ICD-10-CM

## 2025-08-28 DIAGNOSIS — L82.1 SEBORRHEIC KERATOSIS: ICD-10-CM

## 2025-08-28 PROCEDURE — 1159F MED LIST DOCD IN RCRD: CPT | Mod: CPTII,S$GLB,, | Performed by: DERMATOLOGY

## 2025-08-28 PROCEDURE — 1160F RVW MEDS BY RX/DR IN RCRD: CPT | Mod: CPTII,S$GLB,, | Performed by: DERMATOLOGY

## 2025-08-28 PROCEDURE — 3044F HG A1C LEVEL LT 7.0%: CPT | Mod: CPTII,S$GLB,, | Performed by: DERMATOLOGY

## 2025-08-28 PROCEDURE — 99213 OFFICE O/P EST LOW 20 MIN: CPT | Mod: 25,S$GLB,, | Performed by: DERMATOLOGY

## 2025-08-28 PROCEDURE — 1157F ADVNC CARE PLAN IN RCRD: CPT | Mod: CPTII,S$GLB,, | Performed by: DERMATOLOGY

## 2025-08-28 PROCEDURE — 87220 TISSUE EXAM FOR FUNGI: CPT | Mod: S$GLB,,, | Performed by: DERMATOLOGY

## 2025-08-28 RX ORDER — CICLOPIROX 80 MG/ML
SOLUTION TOPICAL
Qty: 6.6 ML | Refills: 11 | Status: SHIPPED | OUTPATIENT
Start: 2025-08-28

## 2025-09-02 ENCOUNTER — DOCUMENTATION ONLY (OUTPATIENT)
Dept: OTHER | Facility: OTHER | Age: 75
End: 2025-09-02
Payer: MEDICARE

## (undated) DEVICE — SEE MEDLINE ITEM 152528

## (undated) DEVICE — PACK DRAPE UNIVERSAL CONVERTOR

## (undated) DEVICE — KIT SPINAL PATIENT CARE JACK

## (undated) DEVICE — TEGADERM IV

## (undated) DEVICE — APPLIER LIGACLIP LG 13IN

## (undated) DEVICE — GAUZE SPONGE 4X4 12PLY

## (undated) DEVICE — DRESSING LEUKOPLAST FLEX 1X3IN

## (undated) DEVICE — SYR 10CC LUER LOCK

## (undated) DEVICE — TUBING HF INSUFFLATION W/ FLTR

## (undated) DEVICE — DRESSING SURGICAL 1/2X1/2

## (undated) DEVICE — SUT VICRYL 3-0 27 SH

## (undated) DEVICE — CORD BIPOLAR 12 FOOT

## (undated) DEVICE — GUIDE WIRE MOTION .035 X 150CM

## (undated) DEVICE — NDL SPINAL SPINOCAN 22GX3.5

## (undated) DEVICE — DRAPE STERI-DRAPE 1000 17X11IN

## (undated) DEVICE — SEE MEDLINE ITEM 157150

## (undated) DEVICE — ALCOHOL 70% ISOP W/GREEN 16OZ

## (undated) DEVICE — CLOSURE SKIN STERI STRIP 1/2X4

## (undated) DEVICE — GOWN POLY REINF BRTH SLV XL

## (undated) DEVICE — GLOVE SENSICARE PI SURG 8

## (undated) DEVICE — PAD CAST SPECIALIST STRL 3

## (undated) DEVICE — SET IRR URLGY 2LINE UNIV SPIKE

## (undated) DEVICE — GLOVE BIOGEL PI MICRO INDIC 7

## (undated) DEVICE — BANDAGE ADHESIVE

## (undated) DEVICE — NDL SCORPION HD MEGALOADER

## (undated) DEVICE — TOURNIQUET SB QC SP 18X4IN

## (undated) DEVICE — SUPPORT ULNA NERVE PROTECTOR

## (undated) DEVICE — UNDERGLOVES BIOGEL PI SIZE 8

## (undated) DEVICE — SUT FIBERLINK TAPE BLU WHT 1.3

## (undated) DEVICE — SEE MEDLINE ITEM 157117

## (undated) DEVICE — TOURNIQUET SB QC DP 18X4IN

## (undated) DEVICE — BLADE SURG CARBON STEEL SZ11

## (undated) DEVICE — SYR 30CC LUER LOCK

## (undated) DEVICE — BLADE SURG STAINLESS STEEL #15

## (undated) DEVICE — CLOSURE SKIN STERI STRIP 1/4X4

## (undated) DEVICE — SEE MEDLINE ITEM 157116

## (undated) DEVICE — KNIFE CARPAL TUNNEL

## (undated) DEVICE — Device

## (undated) DEVICE — SPLINT PLASTER FS 5IN X 30IN

## (undated) DEVICE — GLOVE SENSICARE PI MICRO 6.5

## (undated) DEVICE — DRESSING AQUACEL FOAM 3 X 3

## (undated) DEVICE — COVER OVERHEAD SURG LT BLUE

## (undated) DEVICE — SEE MEDLINE ITEM 152515

## (undated) DEVICE — BNDG COFLEX FOAM LF2 ST 6X5YD

## (undated) DEVICE — KIT SHOULDER POSITIONER SPIDER

## (undated) DEVICE — STOCKINET 4INX48

## (undated) DEVICE — CANNULA PASSPORT 8 MM X 4CM.

## (undated) DEVICE — SEE MEDLINE ITEM 154981

## (undated) DEVICE — DISSECTOR SPACEMAKER + 10-12MM

## (undated) DEVICE — SOL 9P NACL IRR PIC IL

## (undated) DEVICE — IMMOBILIZER HAND ALUMINUM LRG

## (undated) DEVICE — SYR DISP LL 5CC

## (undated) DEVICE — DRESSING AQUACEL FOAM 5 X 5

## (undated) DEVICE — SUT PDS II 0 CT-1 VIL MONO

## (undated) DEVICE — SOL NACL IRR 3000ML

## (undated) DEVICE — NDL 18GA X1 1/2 REG BEVEL

## (undated) DEVICE — DRAPE CORETEMP FLD WRM 56X62IN

## (undated) DEVICE — GLOVE SENSICARE PI GRN 7

## (undated) DEVICE — PAD UNDERPAD 30X30

## (undated) DEVICE — ELECTRODE REM PLYHSV RETURN 9

## (undated) DEVICE — ADHESIVE MASTISOL VIAL 48/BX

## (undated) DEVICE — GLOVE 7.0 PROTEXIS PI BLUE

## (undated) DEVICE — SYS ILLUMINATION MARS3V SPINE

## (undated) DEVICE — SYR ONLY LUER LOCK 20CC

## (undated) DEVICE — BLADE SURG CARBON STEEL #10

## (undated) DEVICE — SEE MEDLINE ITEM 146292

## (undated) DEVICE — BLADE POWERASP 4.0MMX13CM

## (undated) DEVICE — PAD CAST SPECIALIST STRL 4

## (undated) DEVICE — DRAPE LAP T SHT W/ INSTR PAD

## (undated) DEVICE — GLOVE PROTEXIS HYDROGEL SZ7

## (undated) DEVICE — STRAP SECURE 5MM

## (undated) DEVICE — SUT VICRYL PLUS 0 CT1 18IN

## (undated) DEVICE — SEE MEDLINE ITEM 157131

## (undated) DEVICE — SUT VICRYL+ 27 UR-6 VIOL

## (undated) DEVICE — GLOVE BIOGEL PI MICRO SZ 7

## (undated) DEVICE — DRESSING TEGADERM 4.4X5IN

## (undated) DEVICE — COVER LIGHT HANDLE 80/CA

## (undated) DEVICE — NDL SAFETY 22G X 1.5 ECLIPSE

## (undated) DEVICE — APPLICATOR CHLORAPREP ORN 26ML

## (undated) DEVICE — GLOVE BIOGEL ECLIPSE SZ 7

## (undated) DEVICE — BANDAGE ELASTIC 2X5 VELCRO ST

## (undated) DEVICE — STOCKINETTE DBL PLY ST 4X

## (undated) DEVICE — SEE MEDLINE ITEM 152622

## (undated) DEVICE — BANDAGE MATRIX HK LOOP 4IN 5YD

## (undated) DEVICE — SUT MCRYL PLUS 4-0 PS2 27IN

## (undated) DEVICE — DRESSING N ADH OIL EMUL 3X3

## (undated) DEVICE — DRESSING XEROFORM FOIL PK 1X8

## (undated) DEVICE — CONTAINER SPECIMEN STRL 4OZ

## (undated) DEVICE — SPLINT COMFORMABLE 5X30

## (undated) DEVICE — STRIP MEDI WND CLSR 1/2X4IN

## (undated) DEVICE — PADDING CAST 4IN SPECIALIST

## (undated) DEVICE — JELLY LUBRICANT STERILE 4 OZ

## (undated) DEVICE — MARKER SKIN STND TIP BLUE BARR

## (undated) DEVICE — DRESSING TELFA N ADH 3X8

## (undated) DEVICE — TOWEL OR NONABSORB ADH 17X26

## (undated) DEVICE — PENCIL ROCKER SWITCH 10FT CORD

## (undated) DEVICE — SUT VICRYL 2 0 CT 2

## (undated) DEVICE — DRESSING AQUACEL FOAM 4X4IN

## (undated) DEVICE — GLOVE SENSICARE PI GRN 8.5

## (undated) DEVICE — DRAPE STERI INSTRUMENT 1018

## (undated) DEVICE — ELECTRODE MEGADYNE RETURN DUAL

## (undated) DEVICE — DRESSING TRANS 4X4 TEGADERM

## (undated) DEVICE — NDL HYPO REG 25G X 1 1/2

## (undated) DEVICE — DRAPE INCISE IOBAN 2 23X23IN

## (undated) DEVICE — COVER BACK TABLE 72X21

## (undated) DEVICE — BLADE ELECTRO EDGE INSULATED

## (undated) DEVICE — PAD CAST 2 IN X 4YDS STERILE

## (undated) DEVICE — TUBING SUC UNIV W/CONN 12FT

## (undated) DEVICE — SUT PROLENE 3-0 FS-2 18

## (undated) DEVICE — SUT 5-0 PROLENE BV1 HS7 24

## (undated) DEVICE — FORCEP STRAIGHT DISP

## (undated) DEVICE — CATH URTRL OPEN END STR TIP 5F

## (undated) DEVICE — DRAPE PLASTIC U 60X72

## (undated) DEVICE — SUT ETHIBOND 3-0 RB1 30IN

## (undated) DEVICE — NDL HYPO STD REG BVL 18GX1.5IN

## (undated) DEVICE — SEE MEDLINE ITEM 156905

## (undated) DEVICE — SLING ARM LARGE FOAM STRAP

## (undated) DEVICE — SEE MEDLINE ITEM 157185

## (undated) DEVICE — SEE MEDLINE ITEM 146313

## (undated) DEVICE — DRESSING AQUACEL AG SILVER 4X4

## (undated) DEVICE — TUBE SET INFLOW/OUTFLOW

## (undated) DEVICE — BAG LINGEMAN DRAIN UROLOGY

## (undated) DEVICE — DRAPE STERI U-SHAPED 47X51IN

## (undated) DEVICE — TRAY CATH 1-LYR URIMTR 16FR

## (undated) DEVICE — SUT TAPE .5 CIRCLE 36.6X1.3MM

## (undated) DEVICE — SPLINT PLASTER F.S 4INX15IN

## (undated) DEVICE — PROBE ARTHO ENERGY 90 DEG

## (undated) DEVICE — WRAP SHLDR HIP ACCU THRM PACK

## (undated) DEVICE — DRAPE C-ARM/MOBILE XRAY 44X80

## (undated) DEVICE — GLOVE SURG ULTRA TOUCH 7.5

## (undated) DEVICE — MARKER SKIN RULER STERILE

## (undated) DEVICE — SUT 4/0 18IN ETHILON BL P3

## (undated) DEVICE — SUT 2-0 ETHILON 18 FS

## (undated) DEVICE — SUT ETHICON 3-0 BLK MONO PS

## (undated) DEVICE — TAPE SILK 3IN

## (undated) DEVICE — TRAY MINOR GEN SURG OMC

## (undated) DEVICE — CANNULA TWIST IN 7MM X 7CM

## (undated) DEVICE — SKINMARKER W/RULER DEVON

## (undated) DEVICE — SOL PVP-I SCRUB 7.5% 4OZ

## (undated) DEVICE — SUPPORT SLING SHOT II MEDIUM

## (undated) DEVICE — COVER CAMERA OPERATING ROOM

## (undated) DEVICE — SYR B-D DISP CONTROL 10CC100/C

## (undated) DEVICE — SEE MEDLINE ITEM 157148

## (undated) DEVICE — TOWEL OR DISP STRL BLUE 4/PK

## (undated) DEVICE — GLOVE SURGICAL LATEX SZ 6.5